# Patient Record
Sex: FEMALE | Race: WHITE | NOT HISPANIC OR LATINO | Employment: OTHER | URBAN - METROPOLITAN AREA
[De-identification: names, ages, dates, MRNs, and addresses within clinical notes are randomized per-mention and may not be internally consistent; named-entity substitution may affect disease eponyms.]

---

## 2017-05-10 ENCOUNTER — OFFICE VISIT (OUTPATIENT)
Dept: CARDIOLOGY | Facility: CLINIC | Age: 82
End: 2017-05-10

## 2017-05-10 VITALS
DIASTOLIC BLOOD PRESSURE: 84 MMHG | SYSTOLIC BLOOD PRESSURE: 112 MMHG | HEIGHT: 56 IN | HEART RATE: 68 BPM | WEIGHT: 165 LBS | BODY MASS INDEX: 37.12 KG/M2

## 2017-05-10 DIAGNOSIS — Z95.2 S/P AVR: ICD-10-CM

## 2017-05-10 DIAGNOSIS — I51.89 DIASTOLIC DYSFUNCTION: Primary | ICD-10-CM

## 2017-05-10 PROCEDURE — 99213 OFFICE O/P EST LOW 20 MIN: CPT | Performed by: INTERNAL MEDICINE

## 2017-05-10 PROCEDURE — 93000 ELECTROCARDIOGRAM COMPLETE: CPT | Performed by: INTERNAL MEDICINE

## 2018-05-25 ENCOUNTER — OFFICE VISIT (OUTPATIENT)
Dept: CARDIOLOGY | Facility: CLINIC | Age: 83
End: 2018-05-25

## 2018-05-25 VITALS
SYSTOLIC BLOOD PRESSURE: 134 MMHG | DIASTOLIC BLOOD PRESSURE: 60 MMHG | BODY MASS INDEX: 35.38 KG/M2 | HEIGHT: 57 IN | WEIGHT: 164 LBS | HEART RATE: 67 BPM

## 2018-05-25 DIAGNOSIS — I51.89 DIASTOLIC DYSFUNCTION: ICD-10-CM

## 2018-05-25 DIAGNOSIS — Z95.2 S/P AVR: ICD-10-CM

## 2018-05-25 DIAGNOSIS — I10 ESSENTIAL HYPERTENSION: ICD-10-CM

## 2018-05-25 DIAGNOSIS — I49.3 PVC (PREMATURE VENTRICULAR CONTRACTION): ICD-10-CM

## 2018-05-25 DIAGNOSIS — I35.0 AORTIC VALVE STENOSIS, ETIOLOGY OF CARDIAC VALVE DISEASE UNSPECIFIED: Primary | ICD-10-CM

## 2018-05-25 DIAGNOSIS — H54.8 LEGALLY BLIND: ICD-10-CM

## 2018-05-25 DIAGNOSIS — I48.0 PAROXYSMAL ATRIAL FIBRILLATION (HCC): ICD-10-CM

## 2018-05-25 PROCEDURE — 99214 OFFICE O/P EST MOD 30 MIN: CPT | Performed by: NURSE PRACTITIONER

## 2018-05-25 PROCEDURE — 93000 ELECTROCARDIOGRAM COMPLETE: CPT | Performed by: NURSE PRACTITIONER

## 2018-05-25 NOTE — PROGRESS NOTES
Date of Office Visit: 2018  Encounter Provider: RIGO Giraldo  Place of Service: [facility] CARDIOLOGY  Patient Name: Helena Carmen  :1931    Chief Complaint   Patient presents with   • Follow-up   :     HPI: Helena Carmen is a 87 y.o. female who presents today for annual cardiac follow up. She is a new patient to me and her previous records have been reviewed. She has a history of hypertension, hyperlipidemia, thyroid disease, type 2 diabetes mellitus, and is legally blind.    She has a history of severe aortic stenosis and nonobstructive coronary artery disease per cardiac catheterization.  In 2013 she underwent 21 mm [place] trifecta bovine aortic valvular placement.  Postoperatively she developed renal insufficiency, atrial fibrillation, and encephalopathy.  Her atrial fibrillation resolved and warfarin was discontinued.  She's had a follow-up Holter monitor which showed no evidence of atrial fibrillation and PVCs.  Her last echocardiogram was completed in 2013 with an EF of 51%, mild to moderate left ventricular hypertrophy, grade 2 diastolic dysfunction, moderate to severe left atrial enlargement, mild to moderate mitral insufficiency, mild tricuspid insufficiency, and aortic tissue valve functioning well without any stenosis or insufficiency.  She is an established patient of Dr. Beth Butcher and was last in the office in May 2017.    She is accompanied today by her daughter.  She does experience dyspnea with walking long distances which has improved.  She denies chest pain, PND, orthopnea, cough, edema, palpitations, dizziness, or syncope.  Since her last office visit she has now been started on insulin and feels that her blood sugars are better controlled.  Her blood pressure and heart rate are both normal.    The following portion of the patient's history were reviewed and updated as appropriate: past medical history, past surgical  history, past social history, past family history, allergies, current medications, and problem list.    Past Medical History:   Diagnosis Date   • Aortic valve stenosis     s/p tissue AVR   • Back pain    • Diastolic dysfunction     Grade 2 per echocardiogram 2013   • Diverticulosis    • Exertional shortness of breath    • Fatigue    • Hiatal hernia    • Hyperlipidemia    • Hypertension    • Hyperthyroidism    • Hypothyroidism    • Left ventricular hypertrophy    • Legally blind    • Macular degeneration    • Mitral regurgitation    • Osteoarthritis of hip    • Paroxysmal atrial fibrillation    • Premature ventricular contractions    • Pulmonary hypertension    • Renal insufficiency syndrome    • Type 2 diabetes mellitus     type 2       Past Surgical History:   Procedure Laterality Date   • AORTIC VALVE REPAIR/REPLACEMENT     • CATARACT EXTRACTION      1970, 1999   • HYSTERECTOMY  2007   • STERNOTOMY         Social History     Social History   • Marital status:      Spouse name: N/A   • Number of children: N/A   • Years of education: N/A     Occupational History   • Not on file.     Social History Main Topics   • Smoking status: Former Smoker   • Smokeless tobacco: Never Used      Comment: caffeine use   • Alcohol use No   • Drug use: Unknown   • Sexual activity: Not on file       Family History   Problem Relation Age of Onset   • Heart disease Mother    • Hypertension Mother    • Stroke Mother    • Diabetes Mother         mellitus   • Other Other         cardiovascular disorder       Review of Systems   Constitution: Positive for malaise/fatigue. Negative for chills, diaphoresis, fever, night sweats, weight gain and weight loss.   HENT: Negative for hearing loss, nosebleeds, sore throat and tinnitus.    Eyes: Positive for vision loss in left eye and vision loss in right eye. Negative for blurred vision, double vision, pain and visual disturbance.   Cardiovascular: Positive for dyspnea on exertion. Negative  for chest pain, claudication, cyanosis, irregular heartbeat, leg swelling, near-syncope, orthopnea, palpitations, paroxysmal nocturnal dyspnea and syncope.   Respiratory: Negative for cough, hemoptysis, shortness of breath, snoring and wheezing.    Endocrine: Positive for cold intolerance and heat intolerance. Negative for polyuria.   Hematologic/Lymphatic: Negative for bleeding problem. Does not bruise/bleed easily.   Skin: Negative for color change, dry skin, flushing and itching.   Musculoskeletal: Positive for joint pain. Negative for falls, joint swelling, muscle cramps, muscle weakness and myalgias.   Gastrointestinal: Negative for abdominal pain, constipation, heartburn, melena, nausea and vomiting.   Genitourinary: Positive for frequency. Negative for dysuria and hematuria.   Neurological: Negative for excessive daytime sleepiness, dizziness, light-headedness, loss of balance, numbness, paresthesias, seizures and vertigo.   Psychiatric/Behavioral: Negative for altered mental status, depression, memory loss and substance abuse. The patient does not have insomnia and is not nervous/anxious.    Allergic/Immunologic: Negative for environmental allergies.       Allergies   Allergen Reactions   • Erythromycin    • Keflex  [Cephalexin]    • Penicillins    • Sulfa Antibiotics          Current Outpatient Prescriptions:   •  amLODIPine (NORVASC) 10 MG tablet, Take 1 tablet by mouth daily., Disp: , Rfl:   •  aspirin 81 MG tablet, Take by mouth daily., Disp: , Rfl:   •  esomeprazole (NEXIUM) 40 MG capsule, Take 1 capsule by mouth daily., Disp: , Rfl:   •  fenofibrate (TRICOR) 145 MG tablet, Take 1 tablet by mouth daily., Disp: , Rfl:   •  Ferrous Sulfate (IRON) 325 (65 FE) MG tablet, Take 1 tablet by mouth 2 (two) times a day with meals., Disp: , Rfl:   •  gabapentin (NEURONTIN) 600 MG tablet, Take 1 tablet by mouth 2 (two) times a day., Disp: , Rfl:   •  glyBURIDE-metFORMIN (GLUCOVANCE) 5-500 MG per tablet, Take 2  "tablets by mouth 2 (two) times a day., Disp: , Rfl:   •  isosorbide mononitrate (IMDUR) 60 MG 24 hr tablet, Take 2 tablets by mouth daily., Disp: , Rfl:   •  levothyroxine (SYNTHROID) 25 MCG tablet, Take 1 tablet by mouth daily., Disp: , Rfl:   •  linagliptin (TRADJENTA) 5 MG tablet tablet, Take 5 mg by mouth daily., Disp: , Rfl:   •  loratadine (CLARITIN) 10 MG tablet, Take 1 tablet by mouth daily., Disp: , Rfl:   •  LORazepam (ATIVAN) 0.5 MG tablet, Take 1 tablet by mouth as needed., Disp: , Rfl:   •  losartan (COZAAR) 100 MG tablet, Take 1 tablet by mouth daily., Disp: , Rfl:   •  metoclopramide (REGLAN) 10 MG tablet, Take 1 tablet by mouth daily., Disp: , Rfl:   •  metoprolol tartrate (LOPRESSOR) 50 MG tablet, Take 1 tablet by mouth every 12 (twelve) hours., Disp: , Rfl:   •  montelukast (SINGULAIR) 10 MG tablet, Take 1 tablet by mouth daily., Disp: , Rfl:   •  Multiple Vitamin tablet, Take 1 tablet by mouth daily., Disp: , Rfl:   •  Sennosides (SENOKOT PO), Take by mouth., Disp: , Rfl:   •  silodosin (RAPAFLO) 8 MG capsule, Take 4 mg by mouth daily with breakfast., Disp: , Rfl:   •  simvastatin (ZOCOR) 40 MG tablet, Take 20 mg by mouth every night., Disp: , Rfl:   •  spironolactone (ALDACTONE) 25 MG tablet, Take 0.5 tablets by mouth daily., Disp: , Rfl:   •  Vitamin E 400 UNITS tablet, Take 1 tablet by mouth daily., Disp: , Rfl:       Objective:     Vitals:    05/25/18 1440   BP: 134/60   Pulse: 67   Weight: 74.4 kg (164 lb)   Height: 144.8 cm (57\")     Body mass index is 35.49 kg/m².    PHYSICAL EXAM:    Vitals Reviewed.   General Appearance: No acute distress, well developed and well nourished.   Eyes: Conjunctiva and lids: No erythema, swelling, or discharge. Sclera non-icteric.  Legally blind, wears glasses.  HENT: Atraumatic, normocephalic. External eyes, ears, and nose normal. No hearing loss noted. Mucous membranes normal. Lips not cyanotic. Neck supple with no tenderness.  Respiratory: No signs of " respiratory distress. Respiration rhythm and depth normal.   Clear to auscultation. No rales, crackles, rhonchi, or wheezing auscultated.   Cardiovascular:  Jugular Venous Pressure: Normal  Heart Rate and Rhythm: Normal, Heart Sounds: Normal S1 and S2. No S3 or S4 noted.  Murmurs: No murmurs noted. No rubs, thrills, or gallops.   Arterial Pulses: Carotid pulses normal. No carotid bruit noted. Posterior tibialis and dorsalis pedis pulses normal.   Lower Extremities: No edema noted.  Gastrointestinal:  Abdomen soft, non-distended, non-tender. Normal bowel sounds. No hepatomegaly.   Musculoskeletal: Normal movement of extremities  Skin and Nails: General appearance normal. No pallor, cyanosis, diaphoresis. Skin temperature normal. No clubbing of fingernails.   Psychiatric: Patient alert and oriented to person, place, and time. Speech and behavior appropriate. Normal mood and affect.       ECG 12 Lead  Date/Time: 5/25/2018 2:37 PM  Performed by: MARY REDMOND  Authorized by: MARY REDMOND   Comparison: compared with previous ECG from 5/10/2017  Similar to previous ECG  Rhythm: sinus rhythm  Ectopy: frequent PVCs  Rate: normal  BPM: 67  Conduction: conduction normal  ST Segments: ST segments normal  T Waves: T waves normal  QRS axis: normal  Clinical impression: abnormal ECG              Assessment:       Diagnosis Plan   1. Aortic valve stenosis, etiology of cardiac valve disease unspecified     2. S/P AVR     3. PVC (premature ventricular contraction)     4. Diastolic dysfunction     5. Essential hypertension     6. Legally blind     7. Paroxysmal atrial fibrillation            Plan:       1.  Severe Aortic Stenosis: Status post aortic valve tissue replacement in June 2013.  Doing well and no heart murmur appreciated.  2.  PVCs: Noted on EKG today and patient is asymptomatic.  She has a history of PVCs and ventricular bigeminy.  3.  Diastolic Dysfunction: Grade 2 per 2-D echocardiogram in 2013.  Appears well  compensated.  4.  Hypertension: Blood pressure well controlled today on current medication regimen.  I will request her last BMP/CMP from PCP office.  5.  Legally blind: Unchanged and due to macular degeneration.  6.  Postoperative Atrial Fibrillation: No reoccurrence, remains on beta blocker therapy, off warfarin.  7.  I've recommended follow-up with Dr. Beth Butcher in one year, unless otherwise needed sooner.    Addendum 5/31/2018: I reviewed blood work completed on 4/2/18 which included a CMP which was normal except for elevated creatinine of 1.44 and blood glucose 241.  Potassium normal at 4.4.  Hemoglobin A1c elevated at 8.5%.  CBC showed a normal hemoglobin and hematocrit.  Total cholesterol 164, triglycerides 353, HDL 35, and LDL 58.  TSH normal at 1.54.    As always, it has been a pleasure to participate in your patient's care.      Sincerely,         RIGO Pedro

## 2018-05-31 PROBLEM — E78.1 HYPERTRIGLYCERIDEMIA: Status: ACTIVE | Noted: 2018-05-31

## 2019-03-20 ENCOUNTER — OFFICE VISIT (OUTPATIENT)
Dept: GASTROENTEROLOGY | Facility: CLINIC | Age: 84
End: 2019-03-20

## 2019-03-20 VITALS
BODY MASS INDEX: 35.6 KG/M2 | WEIGHT: 165 LBS | DIASTOLIC BLOOD PRESSURE: 84 MMHG | SYSTOLIC BLOOD PRESSURE: 156 MMHG | HEIGHT: 57 IN | TEMPERATURE: 98.4 F

## 2019-03-20 DIAGNOSIS — R10.84 GENERALIZED ABDOMINAL PAIN: Primary | ICD-10-CM

## 2019-03-20 DIAGNOSIS — R19.7 DIARRHEA, UNSPECIFIED TYPE: ICD-10-CM

## 2019-03-20 PROCEDURE — 99203 OFFICE O/P NEW LOW 30 MIN: CPT | Performed by: INTERNAL MEDICINE

## 2019-03-20 RX ORDER — PANTOPRAZOLE SODIUM 40 MG/1
40 TABLET, DELAYED RELEASE ORAL DAILY
COMMUNITY
End: 2022-03-09 | Stop reason: HOSPADM

## 2019-03-20 RX ORDER — ROSUVASTATIN CALCIUM 20 MG/1
20 TABLET, COATED ORAL DAILY
COMMUNITY
End: 2022-03-09 | Stop reason: HOSPADM

## 2019-03-20 RX ORDER — INSULIN GLARGINE 100 [IU]/ML
12 INJECTION, SOLUTION SUBCUTANEOUS DAILY
COMMUNITY
End: 2022-03-21 | Stop reason: ALTCHOICE

## 2019-03-20 NOTE — PROGRESS NOTES
Chief Complaint   Patient presents with   • Hemorrhoids   • change in bowel habits   • Diarrhea       History of Present Illness: 88-year-old female c/o anal irritation that she thinks is from hemorrhoids. She has had explosive BM's. She doesn't feel like she ever completely empties out. She has on and off diarrhea. NO constipation. 2-3 BM/day average.  NO rectal bleedng or melena. She has on and off lower abdominal pain. She doesn't know what makes the abdominal pain worse or better. No nausea or vomiting. No fevers, chills. Weight stable. Last colonoscopy in 2013 or 2014. Weight stable. Nonsmoker. No ETOH.   Past Medical History:   Diagnosis Date   • Aortic valve stenosis     s/p tissue AVR   • Back pain    • Diastolic dysfunction     Grade 2 per echocardiogram 2013   • Diverticulosis    • Exertional shortness of breath    • Fatigue    • Hiatal hernia    • Hyperlipidemia    • Hypertension    • Hyperthyroidism    • Hypertriglyceridemia 5/31/2018   • Hypothyroidism    • Left ventricular hypertrophy    • Legally blind    • Macular degeneration    • Mitral regurgitation    • Osteoarthritis of hip    • Paroxysmal atrial fibrillation (CMS/HCC)    • Premature ventricular contractions    • Pulmonary hypertension (CMS/HCC)    • Renal insufficiency syndrome    • Type 2 diabetes mellitus (CMS/HCC)     type 2       Past Surgical History:   Procedure Laterality Date   • AORTIC VALVE REPAIR/REPLACEMENT     • CATARACT EXTRACTION      1970, 1999   • ENDOSCOPY  08/15/2014    no gross lesions in stomach/duodenum, erythrematous mucosa in stomach   • HYSTERECTOMY  2007   • STERNOTOMY           Current Outpatient Medications:   •  amLODIPine (NORVASC) 10 MG tablet, Take 1 tablet by mouth daily., Disp: , Rfl:   •  aspirin 81 MG tablet, Take by mouth daily., Disp: , Rfl:   •  fenofibrate (TRICOR) 145 MG tablet, Take 1 tablet by mouth daily., Disp: , Rfl:   •  Ferrous Sulfate (IRON) 325 (65 FE) MG tablet, Take 1 tablet by mouth 2 (two)  times a day with meals., Disp: , Rfl:   •  gabapentin (NEURONTIN) 600 MG tablet, Take 1 tablet by mouth 2 (two) times a day., Disp: , Rfl:   •  glyBURIDE-metFORMIN (GLUCOVANCE) 5-500 MG per tablet, Take 2 tablets by mouth 2 (two) times a day., Disp: , Rfl:   •  insulin glargine (LANTUS) 100 UNIT/ML injection, Inject 12 Units under the skin into the appropriate area as directed Daily., Disp: , Rfl:   •  isosorbide mononitrate (IMDUR) 60 MG 24 hr tablet, Take 2 tablets by mouth daily., Disp: , Rfl:   •  levothyroxine (SYNTHROID) 25 MCG tablet, Take 1 tablet by mouth daily., Disp: , Rfl:   •  linagliptin (TRADJENTA) 5 MG tablet tablet, Take 5 mg by mouth daily., Disp: , Rfl:   •  loratadine (CLARITIN) 10 MG tablet, Take 1 tablet by mouth daily., Disp: , Rfl:   •  LORazepam (ATIVAN) 0.5 MG tablet, Take 1 tablet by mouth as needed., Disp: , Rfl:   •  losartan (COZAAR) 100 MG tablet, Take 1 tablet by mouth daily., Disp: , Rfl:   •  metoclopramide (REGLAN) 10 MG tablet, Take 1 tablet by mouth daily., Disp: , Rfl:   •  metoprolol tartrate (LOPRESSOR) 50 MG tablet, Take 1 tablet by mouth every 12 (twelve) hours., Disp: , Rfl:   •  montelukast (SINGULAIR) 10 MG tablet, Take 1 tablet by mouth daily., Disp: , Rfl:   •  Multiple Vitamin tablet, Take 1 tablet by mouth daily., Disp: , Rfl:   •  pantoprazole (PROTONIX) 40 MG EC tablet, Take 40 mg by mouth Daily., Disp: , Rfl:   •  rosuvastatin (CRESTOR) 20 MG tablet, Take 20 mg by mouth Daily., Disp: , Rfl:   •  Sennosides (SENOKOT PO), Take by mouth., Disp: , Rfl:   •  silodosin (RAPAFLO) 8 MG capsule, Take 4 mg by mouth daily with breakfast., Disp: , Rfl:   •  spironolactone (ALDACTONE) 25 MG tablet, Take 0.5 tablets by mouth daily., Disp: , Rfl:   •  Vitamin E 400 UNITS tablet, Take 1 tablet by mouth daily., Disp: , Rfl:     Allergies   Allergen Reactions   • Erythromycin    • Keflex  [Cephalexin]    • Penicillins    • Sulfa Antibiotics        Family History   Problem Relation  Age of Onset   • Heart disease Mother    • Hypertension Mother    • Stroke Mother    • Diabetes Mother         mellitus   • Other Other         cardiovascular disorder       Social History     Socioeconomic History   • Marital status:      Spouse name: Not on file   • Number of children: Not on file   • Years of education: Not on file   • Highest education level: Not on file   Tobacco Use   • Smoking status: Former Smoker   • Smokeless tobacco: Never Used   • Tobacco comment: caffeine use   Substance and Sexual Activity   • Alcohol use: No       Review of Systems   All other systems reviewed and are negative.      Vitals:    03/20/19 1352   BP: 156/84   Temp: 98.4 °F (36.9 °C)       Physical Exam   Constitutional: She is oriented to person, place, and time. She appears well-developed and well-nourished. No distress.   HENT:   Head: Normocephalic and atraumatic. Hair is normal.   Right Ear: Hearing, tympanic membrane, external ear and ear canal normal. No drainage. No decreased hearing is noted.   Left Ear: Hearing, tympanic membrane, external ear and ear canal normal. No decreased hearing is noted.   Nose: No nasal deformity.   Mouth/Throat: Oropharynx is clear and moist.   Eyes: Conjunctivae, EOM and lids are normal. Pupils are equal, round, and reactive to light. Right eye exhibits no discharge. Left eye exhibits no discharge.   Neck: Normal range of motion. Neck supple. No JVD present. No tracheal deviation present. No thyromegaly present.   Cardiovascular: Normal rate, regular rhythm, normal heart sounds, intact distal pulses and normal pulses. Exam reveals no gallop and no friction rub.   No murmur heard.  Pulmonary/Chest: Effort normal and breath sounds normal. No respiratory distress. She has no wheezes. She has no rales. She exhibits no tenderness.   Abdominal: Soft. Bowel sounds are normal. She exhibits no distension and no mass. There is no tenderness. There is no rebound and no guarding. No hernia.    Genitourinary: Rectal exam shows guaiac negative stool.   Genitourinary Comments: External hemorrhoids.    Musculoskeletal: Normal range of motion. She exhibits no edema, tenderness or deformity.   Lymphadenopathy:     She has no cervical adenopathy.   Neurological: She is alert and oriented to person, place, and time. She has normal reflexes. She displays normal reflexes. No cranial nerve deficit. She exhibits normal muscle tone. Coordination normal.   Skin: Skin is warm and dry. No rash noted. She is not diaphoretic. No erythema.   Psychiatric: She has a normal mood and affect. Her behavior is normal. Judgment and thought content normal.   Vitals reviewed.      Helena was seen today for hemorrhoids, change in bowel habits and diarrhea.    Diagnoses and all orders for this visit:    Generalized abdominal pain  -     CT Abdomen Pelvis With Contrast; Future    Diarrhea, unspecified type  -     CT Abdomen Pelvis With Contrast; Future      Assessment:  1. H/o colon polyps  2) Abdominal discomfort.   3) Off and on Diarrhea.     Recommendations:  1. Get labs done recently by Dr. Hickman  2) Take a fiber supplement daily  3) CT abd/pelvis   4) f/u 6 weeks.     Return in about 6 weeks (around 5/1/2019).    Freddy Martel MD  3/20/2019

## 2019-03-21 ENCOUNTER — TELEPHONE (OUTPATIENT)
Dept: GASTROENTEROLOGY | Facility: CLINIC | Age: 84
End: 2019-03-21

## 2019-03-21 DIAGNOSIS — R10.84 GENERALIZED ABDOMINAL PAIN: Primary | ICD-10-CM

## 2019-03-21 DIAGNOSIS — E35 DISORDERS OF ENDOCRINE GLANDS IN DISEASES CLASSIFIED ELSEWHERE: ICD-10-CM

## 2019-03-21 NOTE — TELEPHONE ENCOUNTER
Please tell her that the labs done by Dr. Hickman do not include labs that I would like to see.  Please have her come by the office to have the following lab work drawn:  -CBC, comprehensive metabolic panel, TSH, celiac sprue antibody panel. Zuleima kjdoug

## 2019-03-22 NOTE — TELEPHONE ENCOUNTER
Call to pt.  Advise per Dr Martel that labs done by Dr Hickman do not include labs that he would like to see.  Come to office for additional labs.    Check with [place] - may come today as walk in between 1-2pm.    Order placed for cbc, cmp, tsh, celiac sprue antibody panel - message to DR Martel.

## 2019-03-23 LAB
ALBUMIN SERPL-MCNC: 4.4 G/DL (ref 3.5–5.2)
ALBUMIN/GLOB SERPL: 1.8 G/DL
ALP SERPL-CCNC: 24 U/L (ref 39–117)
ALT SERPL-CCNC: 13 U/L (ref 1–33)
AST SERPL-CCNC: 18 U/L (ref 1–32)
BILIRUB SERPL-MCNC: 0.3 MG/DL (ref 0.2–1.2)
BUN SERPL-MCNC: 24 MG/DL (ref 8–23)
BUN/CREAT SERPL: 20 (ref 7–25)
CALCIUM SERPL-MCNC: 9.6 MG/DL (ref 8.6–10.5)
CHLORIDE SERPL-SCNC: 101 MMOL/L (ref 98–107)
CO2 SERPL-SCNC: 20.2 MMOL/L (ref 22–29)
CREAT SERPL-MCNC: 1.2 MG/DL (ref 0.57–1)
DIFFERENTIAL COMMENT: NORMAL
ERYTHROCYTE [DISTWIDTH] IN BLOOD BY AUTOMATED COUNT: 14.2 % (ref 12.3–15.4)
GLOBULIN SER CALC-MCNC: 2.4 GM/DL
GLUCOSE SERPL-MCNC: 228 MG/DL (ref 65–99)
HCT VFR BLD AUTO: 37.7 % (ref 34–46.6)
HGB BLD-MCNC: 11.8 G/DL (ref 12–15.9)
MCH RBC QN AUTO: 29.1 PG (ref 26.6–33)
MCHC RBC AUTO-ENTMCNC: 31.3 G/DL (ref 31.5–35.7)
MCV RBC AUTO: 93.1 FL (ref 79–97)
PLATELET # BLD AUTO: 52 10*3/MM3 (ref 140–450)
PLATELET BLD QL SMEAR: NORMAL
POTASSIUM SERPL-SCNC: 4.5 MMOL/L (ref 3.5–5.2)
PROT SERPL-MCNC: 6.8 G/DL (ref 6–8.5)
RBC # BLD AUTO: 4.05 10*6/MM3 (ref 3.77–5.28)
RBC MORPH BLD: NORMAL
SODIUM SERPL-SCNC: 136 MMOL/L (ref 136–145)
TSH SERPL DL<=0.005 MIU/L-ACNC: 2.32 MIU/ML (ref 0.27–4.2)
WBC # BLD AUTO: 4.84 10*3/MM3 (ref 3.4–10.8)

## 2019-03-25 LAB
ENDOMYSIUM IGA SER QL: NEGATIVE
GLIADIN PEPTIDE IGA SER-ACNC: 3 UNITS (ref 0–19)
GLIADIN PEPTIDE IGG SER-ACNC: 2 UNITS (ref 0–19)
IGA SERPL-MCNC: 121 MG/DL (ref 64–422)
TTG IGA SER-ACNC: <2 U/ML (ref 0–3)
TTG IGG SER-ACNC: <2 U/ML (ref 0–5)

## 2019-03-27 ENCOUNTER — TELEPHONE (OUTPATIENT)
Dept: GASTROENTEROLOGY | Facility: CLINIC | Age: 84
End: 2019-03-27

## 2019-03-27 NOTE — TELEPHONE ENCOUNTER
----- Message from Freddy Martel MD sent at 3/25/2019  3:03 PM EDT -----  Tell her that I do not have any lab work to compare with the lab work that we avani on her recently.  Her celiac sprue antibody panel came back normal which is good.  Her CBC shows a normal white count of 4.8.  Her hemoglobin is slightly low at 11.8.  Her platelet count is low at 52,000.  Again I do not have anything to compare her platelet count 2 from past labs.  Her blood glucose was 228 but she knows that she has diabetes mellitus.  Her serum creatinine is 1.2 which is mildly elevated.  Her liver function tests look normal which is good.  Her thyroid-stimulating hormone is normal.  We will see what the CAT scan of the abdomen and pelvis shows?  Please fax a copy of these labs to her PCP, Dr. Mariah Hickman.. Thx. kjh

## 2019-03-27 NOTE — TELEPHONE ENCOUNTER
Call to pt.  Advise per Dr Martel do not have any lab work to compare recent lab work to.  Celiac panel came back normal, which is good.  CBC shows a normal white count of 4.8.  Hgb is slightly low at 11.8.  Platelet count is low at 52,000.  Again - do not have anything to compare this to from past labs.  Blood glucose was 228, but know that have DM.  Cr is 1.2, which is mildly elevated.  LFTs look normal, which is good.  TSH is normal.  Will see what the CT shows.  Verb understanding.    Labs faxed via epic to Dr Hickman.

## 2019-03-29 ENCOUNTER — TELEPHONE (OUTPATIENT)
Dept: GASTROENTEROLOGY | Facility: CLINIC | Age: 84
End: 2019-03-29

## 2019-03-29 DIAGNOSIS — R10.84 GENERALIZED ABDOMINAL PAIN: Primary | ICD-10-CM

## 2019-04-07 NOTE — TELEPHONE ENCOUNTER
Please reorder her CT abd/pelvis to be without IV contrast (but with oral contrast) to evaluate her abdominal pain. Let the patient and her daughter know that we have reordered the CT abd/pelvis to be done without IV contrast (as recommended by her Nephrologist, Dr. Wick) but with oral contrast (which shouldn't hurt her or her kidneys but will give us more information). thx.kjh

## 2019-04-08 NOTE — TELEPHONE ENCOUNTER
Pt's daughter called and advised per Dr Martel and advised per Dr Martel that he has changed the order to ct abd/pelvis to without IV contrast but with oral contrast.  This should be safe for her kidneys but will give him more info.  Pt' s daughter verb understanding.

## 2019-04-08 NOTE — TELEPHONE ENCOUNTER
VM to daughter, Margaret (see hipaa) with request to contact office.    CT order placed per DR Martel instructions.  Call to [facility] and spoke with Sima.  Advise of order change.  She will correct in system.

## 2019-04-15 ENCOUNTER — APPOINTMENT (OUTPATIENT)
Dept: CT IMAGING | Facility: HOSPITAL | Age: 84
End: 2019-04-15

## 2019-04-15 ENCOUNTER — HOSPITAL ENCOUNTER (OUTPATIENT)
Dept: CT IMAGING | Facility: HOSPITAL | Age: 84
Discharge: HOME | End: 2019-04-15
Admitting: INTERNAL MEDICINE

## 2019-04-15 DIAGNOSIS — R10.84 GENERALIZED ABDOMINAL PAIN: ICD-10-CM

## 2019-04-15 PROCEDURE — 74176 CT ABD & PELVIS W/O CONTRAST: CPT

## 2019-04-19 ENCOUNTER — TELEPHONE (OUTPATIENT)
Dept: GASTROENTEROLOGY | Facility: CLINIC | Age: 84
End: 2019-04-19

## 2019-04-19 NOTE — TELEPHONE ENCOUNTER
Pt's daughter Radha called and results reviewed with her.She verb understanding .      Copy of ct scan report and Dr Martel's note faxed to pt's pcp thru [facility].

## 2019-04-19 NOTE — TELEPHONE ENCOUNTER
----- Message from Freddy Martel MD sent at 4/18/2019  4:27 PM EDT -----  I called the patient and discussed the results of this CAT scan the abdomen and pelvis with her.  She has never had jaundice or hepatitis and has never had any chronic liver abnormalities.  I told her that we should probably do an EGD to biopsy her thickened gastric folds.  The patient is really not interested in having that done.  I told her to think about it and to discuss with her daughter.  She is going to follow-up with 1 of the nurse practitioners in about 3 weeks and can discuss this then.  Please send a copy of this report to her PCP.. Thx. kjdoug

## 2019-04-26 ENCOUNTER — OFFICE VISIT (OUTPATIENT)
Dept: OBSTETRICS AND GYNECOLOGY | Facility: CLINIC | Age: 84
End: 2019-04-26

## 2019-04-26 ENCOUNTER — APPOINTMENT (OUTPATIENT)
Dept: WOMENS IMAGING | Facility: HOSPITAL | Age: 84
End: 2019-04-26

## 2019-04-26 ENCOUNTER — PROCEDURE VISIT (OUTPATIENT)
Dept: OBSTETRICS AND GYNECOLOGY | Facility: CLINIC | Age: 84
End: 2019-04-26

## 2019-04-26 VITALS
SYSTOLIC BLOOD PRESSURE: 159 MMHG | BODY MASS INDEX: 35.06 KG/M2 | HEART RATE: 64 BPM | WEIGHT: 162 LBS | DIASTOLIC BLOOD PRESSURE: 76 MMHG

## 2019-04-26 DIAGNOSIS — Z01.419 ENCOUNTER FOR GYNECOLOGICAL EXAMINATION: Primary | ICD-10-CM

## 2019-04-26 DIAGNOSIS — Z12.31 VISIT FOR SCREENING MAMMOGRAM: Primary | ICD-10-CM

## 2019-04-26 PROCEDURE — G0101 CA SCREEN;PELVIC/BREAST EXAM: HCPCS | Performed by: OBSTETRICS & GYNECOLOGY

## 2019-04-26 PROCEDURE — 77067 SCR MAMMO BI INCL CAD: CPT | Performed by: OBSTETRICS & GYNECOLOGY

## 2019-04-26 PROCEDURE — 77067 SCR MAMMO BI INCL CAD: CPT | Performed by: RADIOLOGY

## 2019-04-26 RX ORDER — ASPIRIN 81 MG/1
TABLET ORAL
COMMUNITY
Start: 2019-03-21 | End: 2019-05-21 | Stop reason: SDUPTHER

## 2019-04-26 NOTE — PROGRESS NOTES
Subjective   Helena Carmen is a 88 y.o. female here for annual exam.   Mammo- 2019      History of Present Illness   Patient is an 88-year-old female that presents for annual gynecological exam.  She is a former patient of Dr. Shannon.  Patient's daughter states that she has a past history of hysterectomy for precancer of the uterus.  Denies any recent vaginal bleeding.  Patient had her screening mammogram today and her daughter states that her last bone density scan was several years ago and they were told it was normal.    The following portions of the patient's history were reviewed and updated as appropriate: allergies, current medications, past family history, past medical history, past social history, past surgical history and problem list.    Review of Systems   Genitourinary: Negative for vaginal bleeding.   All other systems reviewed and are negative.      Objective   Physical Exam  Physical Exam   Constitutional: She appears well-developed and well-nourished.   Cardiovascular: Normal rate and regular rhythm.    Pulmonary/Chest: Effort normal and breath sounds normal. Right breast exhibits no inverted nipple, no mass, no nipple discharge, no skin change and no tenderness. Left breast exhibits no inverted nipple, no mass, no nipple discharge, no skin change and no tenderness.   Abdominal: Soft. She exhibits no distension. There is no tenderness.   Genitourinary: Vagina normal. There is no rash, tenderness, lesion or injury on the right labia. There is no rash, tenderness, lesion or injury on the left labia. Right adnexum displays no mass, no tenderness and no fullness. Left adnexum displays no mass, no tenderness and no fullness.   Genitourinary Comments: Uterus and cervix absent   Neurological: She is alert.   Psychiatric: She has a normal mood and affect.   Vitals reviewed.      Assessment/Plan   Helena was seen today for gynecologic exam.    Diagnoses and all orders for this visit:    Encounter for  gynecological examination    Patient's previous GYN records and DEXA scan have been requested.  Patient was counseled that she may follow-up on an as-needed basis and to follow-up if she ever experiences any vaginal bleeding.  She may continue with her screening mammograms and bone density scans through our office.

## 2019-05-06 ENCOUNTER — OFFICE VISIT (OUTPATIENT)
Dept: GASTROENTEROLOGY | Facility: CLINIC | Age: 84
End: 2019-05-06

## 2019-05-06 ENCOUNTER — TELEPHONE (OUTPATIENT)
Dept: GASTROENTEROLOGY | Facility: CLINIC | Age: 84
End: 2019-05-06

## 2019-05-06 VITALS
TEMPERATURE: 98.5 F | BODY MASS INDEX: 35.6 KG/M2 | SYSTOLIC BLOOD PRESSURE: 130 MMHG | DIASTOLIC BLOOD PRESSURE: 76 MMHG | HEIGHT: 57 IN | WEIGHT: 165 LBS

## 2019-05-06 DIAGNOSIS — R93.5 ABNORMAL CT OF THE ABDOMEN: ICD-10-CM

## 2019-05-06 DIAGNOSIS — R10.30 LOWER ABDOMINAL PAIN: Primary | ICD-10-CM

## 2019-05-06 DIAGNOSIS — R19.7 DIARRHEA, UNSPECIFIED TYPE: ICD-10-CM

## 2019-05-06 DIAGNOSIS — K21.9 GASTROESOPHAGEAL REFLUX DISEASE, ESOPHAGITIS PRESENCE NOT SPECIFIED: ICD-10-CM

## 2019-05-06 PROCEDURE — 99214 OFFICE O/P EST MOD 30 MIN: CPT | Performed by: NURSE PRACTITIONER

## 2019-05-06 RX ORDER — METOCLOPRAMIDE 10 MG/1
10 TABLET ORAL
Qty: 270 TABLET | Refills: 3 | Status: SHIPPED | OUTPATIENT
Start: 2019-05-06 | End: 2022-03-09 | Stop reason: HOSPADM

## 2019-05-06 NOTE — TELEPHONE ENCOUNTER
----- Message from RIGO Monterroso sent at 5/6/2019  1:39 PM EDT -----  Please fax a letter to Attn: Raeann at [facility] explaining to them it is ok to dispense the reglan. Patient has been on it for over 14 years without any unwanted side effects. I have discussed the possible side effects including tardive dyskinesia and the patient wants to continue the reglan at this time for her gastroparesis. Thanks.   Fax # [phone]

## 2019-05-06 NOTE — TELEPHONE ENCOUNTER
Called [facility] and spoke with pharmacist Raeann .  Laureen Horner's note and she advised we can fax the note to her at [phone].  Note faxed to this number.  Awaiting confirmation.

## 2019-05-06 NOTE — PROGRESS NOTES
Chief Complaint   Patient presents with   • Follow-up       Helena Carmen is a  88 y.o. female here for a follow up visit for abd pain and diarrhea.     HPI  87 yo f presents today accompanied by her daughter for follow up visit for abd pain, GERD and diarrhea. She is a patient of Dr. Martel. She had CT scan abd/pelvis done 4/15/19 that showed:    IMPRESSION:  1. There is no acute abnormality within the abdomen and pelvis.  2. Liver morphology concerning for chronic liver disease.  3. Extensive sigmoid diverticulosis. No CT evidence of diverticulitis.  4. Diffuse gastric fold thickening likely related to underdistention,  however, gastritis is a possibility.    She does have hx GERD but admits she does well with Protonix 40 mg daily. She also has hx gastroparesis and has been doing well with Reglan for the past 14 years. She denies any signs of tardive dyskinesia or any other unwanted side effects. She continues to have issues with chronic diarrhea and abd cramping. She is taking Fiber Con daily and thinks its helping somewhat. She denies any dysphagia, reflux, N&V, constipation, rectal bleeding or melena. She admits her appetite is good and her weight is stable. She does ambulate with a cane.     Past Medical History:   Diagnosis Date   • Aortic valve stenosis     s/p tissue AVR   • Back pain    • Diastolic dysfunction     Grade 2 per echocardiogram 2013   • Diverticulosis    • Exertional shortness of breath    • Fatigue    • Hiatal hernia    • Hyperlipidemia    • Hypertension    • Hyperthyroidism    • Hypertriglyceridemia 5/31/2018   • Hypothyroidism    • Left ventricular hypertrophy    • Legally blind    • Macular degeneration    • Mitral regurgitation    • Osteoarthritis of hip    • Paroxysmal atrial fibrillation (CMS/HCC)    • Premature ventricular contractions    • Pulmonary hypertension (CMS/HCC)    • Renal insufficiency syndrome    • Type 2 diabetes mellitus (CMS/HCC)     type 2       Past Surgical History:    Procedure Laterality Date   • AORTIC VALVE REPAIR/REPLACEMENT     • CATARACT EXTRACTION      1970, 1999   • ENDOSCOPY  08/15/2014    no gross lesions in stomach/duodenum, erythrematous mucosa in stomach   • HYSTERECTOMY  2007   • STERNOTOMY         Scheduled Meds:    Continuous Infusions:  No current facility-administered medications for this visit.     PRN Meds:.    Allergies   Allergen Reactions   • Erythromycin Unknown (See Comments)     Pt states she does not remember but it was many years ago   • Keflex [Cephalexin] Unknown (See Comments)     Pt states she does not remember reaction but it was many years ago    • Penicillins Rash   • Sulfa Antibiotics Itching and Rash       Social History     Socioeconomic History   • Marital status:      Spouse name: Not on file   • Number of children: Not on file   • Years of education: Not on file   • Highest education level: Not on file   Tobacco Use   • Smoking status: Former Smoker   • Smokeless tobacco: Never Used   • Tobacco comment: caffeine use   Substance and Sexual Activity   • Alcohol use: No   • Drug use: No       Family History   Problem Relation Age of Onset   • Heart disease Mother    • Hypertension Mother    • Stroke Mother    • Diabetes Mother         mellitus   • Other Other         cardiovascular disorder       Review of Systems   Constitutional: Negative for appetite change, chills, diaphoresis, fatigue, fever and unexpected weight change.   HENT: Negative for nosebleeds, postnasal drip, sore throat, trouble swallowing and voice change.    Respiratory: Negative for cough, choking, chest tightness, shortness of breath and wheezing.    Cardiovascular: Negative for chest pain.   Gastrointestinal: Positive for abdominal distention. Negative for abdominal pain, anal bleeding, blood in stool, constipation, diarrhea, nausea, rectal pain and vomiting.   Endocrine: Negative for polydipsia, polyphagia and polyuria.   Musculoskeletal: Negative for gait  problem.   Skin: Negative for rash and wound.   Allergic/Immunologic: Negative for food allergies.   Neurological: Negative for dizziness, speech difficulty and light-headedness.   Psychiatric/Behavioral: Negative for confusion, self-injury, sleep disturbance and suicidal ideas.       Vitals:    05/06/19 1250   BP: 130/76   Temp: 98.5 °F (36.9 °C)       Physical Exam   Constitutional: She is oriented to person, place, and time. She appears well-developed and well-nourished. She does not appear ill. No distress.   HENT:   Head: Normocephalic.   Eyes: Pupils are equal, round, and reactive to light.   Cardiovascular: Normal rate, regular rhythm and normal heart sounds.   Pulmonary/Chest: Effort normal and breath sounds normal.   Abdominal: Soft. Bowel sounds are normal. She exhibits no distension and no mass. There is no hepatosplenomegaly. There is no tenderness. There is no rebound and no guarding. No hernia.   Musculoskeletal: Normal range of motion.   Neurological: She is alert and oriented to person, place, and time.   Skin: Skin is warm and dry.   Psychiatric: She has a normal mood and affect. Her speech is normal and behavior is normal. Judgment normal.       No images are attached to the encounter.    Assessment & Plan    1. Lower abdominal pain    2. Diarrhea, unspecified type    3. Gastroesophageal reflux disease, esophagitis presence not specified    4. Abnormal CT of the abdomen    I reviewed the CT scan results with her today. Diarrhea is better with the fiber. Will increase the fiber to BID and see if that continues to improve her symptoms. GERD is well controlled at this time and patient does not want to pursue EGD at this time given the abnormal findings of possible gastritis on the CT scan. Call office in 1-2 weeks with update. Follow up with me or Dr. Martel in 2-3 months.

## 2019-05-21 ENCOUNTER — OFFICE VISIT (OUTPATIENT)
Dept: CARDIOLOGY | Facility: CLINIC | Age: 84
End: 2019-05-21

## 2019-05-21 VITALS
SYSTOLIC BLOOD PRESSURE: 138 MMHG | DIASTOLIC BLOOD PRESSURE: 62 MMHG | WEIGHT: 170 LBS | HEART RATE: 66 BPM | HEIGHT: 57 IN | BODY MASS INDEX: 36.68 KG/M2

## 2019-05-21 DIAGNOSIS — I10 ESSENTIAL HYPERTENSION: ICD-10-CM

## 2019-05-21 DIAGNOSIS — Z95.2 S/P AVR: Primary | ICD-10-CM

## 2019-05-21 DIAGNOSIS — I51.89 DIASTOLIC DYSFUNCTION: ICD-10-CM

## 2019-05-21 PROCEDURE — 93000 ELECTROCARDIOGRAM COMPLETE: CPT | Performed by: INTERNAL MEDICINE

## 2019-05-21 PROCEDURE — 99214 OFFICE O/P EST MOD 30 MIN: CPT | Performed by: INTERNAL MEDICINE

## 2019-05-21 NOTE — PROGRESS NOTES
Date of Office Visit: 2019  Encounter Provider: Beth Butcher MD  Place of Service: [facility] CARDIOLOGY  Patient Name: Helena Carmen  :1931      Patient ID:  Helena Carmen is a 88 y.o. female is here for  followup for         History of Present Illness    She was originally seen for a pericardial effusion noted on a CT scan of the abdomen and  pelvis. On her initial exam there was a murmur, and she complained of dyspnea. She had a  PET stress study performed in 2010 which showed no ischemia. She had an echocardiogram  the same day which showed an ejection fraction of 67%, mild concentric left ventricular  hypertrophy, grade IA diastolic dysfunction, moderate to marked left atrial enlargement,  moderate aortic stenosis with a peak gradient of 42 mmHg and a mean gradient of 24 mmHg.   There was mild mitral and tricuspid insufficiency, with a small pericardial effusion. Her  carotid duplex study also performed on that day was normal as well.      She had increasing fatigue and dyspnea and was able to do very little, which she thought was due to her back pain.   She had a 2-D echocardiogram with Doppler in 2013. Her ejection   fraction was 58%. There was severe aortic stenosis with valvular area of 0.6 mm cubed, a   peak gradient of 71 with a mean of 41 mmHg. There was also moderate mitral insufficiency, severe   left atrial enlargement, right ventricular systolic pressure of 45 mmHg and grade II diastolic   dysfunction. She had a normal carotid duplex in 2013.     She went to [facility] after having a cardiac catheterization done. Her cardiac  catheterization showed minimal coronary artery disease. Dr. Murry saw her in the valve  clinic and agreed that she needed aortic valvular replacement. Dr. Lagunas saw her and  was in agreement as well. On 2013, she had a 21 mm [employer] Mitch Trifecta bovine  pericardial valve placed. Postoperatively, she had renal  insufficiency and atrial  fibrillation which have gotten better. She had some encephalopathy postoperatively but  went to [facility] and did rehabilitation and did quite well.     She had PVCs. We did check a Holter monitor and there was no evidence of atrial fibrillation there so we  were able to stop her warfarin.      She was in the hospital with C. Difficile colitis from 07/30/2013 to 08/09/2013. She also had klebsiella urinary tract infection  and was treated with Levaquin. She was in acute renal failure. She had some pancreatitis  at that time as well. During her hospitalization we did see  her and her cardiac status was stable. She had a 2-D echocardiogram with Doppler  performed on 08/01/2013 which revealed an ejection fraction of 51%, mild-to-moderate left  ventricular hypertrophy, grade II diastolic dysfunction, moderate-to-severe left atrial  enlargement, mild-to-moderate mitral insufficiency, mild tricuspid insufficiency, and a  tissue-type aortic valve which was functioning quite well without stenosis or  insufficiency.     She is sedentary due to her blindness.      Lab values done 3/22/2019 show TSH normal 2.3, hemoglobin 11.8, creatinine 1.2, potassium 4.5, blood glucose 228, otherwise normal CMP.    I rechecked her blood pressure here in the office and got 138/62.  She denies chest pain or difficulty breathing.  She does get fatigued and somewhat winded with activity.  She does not feel her heart racing or skipping.  She is had no dizziness or nausea.  She is had no falls.  She denies orthopnea or PND.    Past Medical History:   Diagnosis Date   • Aortic valve stenosis     s/p tissue AVR   • Back pain    • Diastolic dysfunction     Grade 2 per echocardiogram 2013   • Diverticulosis    • Exertional shortness of breath    • Fatigue    • Hiatal hernia    • Hyperlipidemia    • Hypertension    • Hyperthyroidism    • Hypertriglyceridemia 5/31/2018   • Hypothyroidism    • Left ventricular hypertrophy    •  Legally blind    • Macular degeneration    • Mitral regurgitation    • Osteoarthritis of hip    • Paroxysmal atrial fibrillation (CMS/HCC)    • Premature ventricular contractions    • Pulmonary hypertension (CMS/HCC)    • Renal insufficiency syndrome    • Type 2 diabetes mellitus (CMS/HCC)     type 2         Past Surgical History:   Procedure Laterality Date   • AORTIC VALVE REPAIR/REPLACEMENT     • CATARACT EXTRACTION      1970, 1999   • ENDOSCOPY  08/15/2014    no gross lesions in stomach/duodenum, erythrematous mucosa in stomach   • HYSTERECTOMY  2007   • STERNOTOMY         Current Outpatient Medications on File Prior to Visit   Medication Sig Dispense Refill   • amLODIPine (NORVASC) 10 MG tablet Take 1 tablet by mouth daily.     • aspirin 81 MG tablet Take by mouth daily.     • fenofibrate (TRICOR) 145 MG tablet Take 1 tablet by mouth daily.     • Ferrous Sulfate (IRON) 325 (65 FE) MG tablet Take 1 tablet by mouth 2 (two) times a day with meals.     • gabapentin (NEURONTIN) 600 MG tablet Take 1 tablet by mouth 2 (two) times a day.     • glyBURIDE-metFORMIN (GLUCOVANCE) 5-500 MG per tablet Take 2 tablets by mouth 2 (two) times a day.     • insulin glargine (LANTUS) 100 UNIT/ML injection Inject 12 Units under the skin into the appropriate area as directed Daily.     • isosorbide mononitrate (IMDUR) 60 MG 24 hr tablet Take 2 tablets by mouth daily.     • levothyroxine (SYNTHROID) 25 MCG tablet Take 1 tablet by mouth daily.     • linagliptin (TRADJENTA) 5 MG tablet tablet Take 5 mg by mouth daily.     • loratadine (CLARITIN) 10 MG tablet Take 1 tablet by mouth daily.     • LORazepam (ATIVAN) 0.5 MG tablet Take 1 tablet by mouth as needed.     • losartan (COZAAR) 100 MG tablet Take 1 tablet by mouth daily.     • metoclopramide (REGLAN) 10 MG tablet Take 1 tablet by mouth 3 (Three) Times a Day Before Meals. 270 tablet 3   • metoprolol tartrate (LOPRESSOR) 50 MG tablet Take 1 tablet by mouth every 12 (twelve) hours.      • montelukast (SINGULAIR) 10 MG tablet Take 1 tablet by mouth daily.     • Multiple Vitamin tablet Take 1 tablet by mouth daily.     • pantoprazole (PROTONIX) 40 MG EC tablet Take 40 mg by mouth Daily.     • rosuvastatin (CRESTOR) 20 MG tablet Take 20 mg by mouth Daily.     • Sennosides (SENOKOT PO) Take by mouth.     • silodosin (RAPAFLO) 8 MG capsule Take 4 mg by mouth daily with breakfast.     • spironolactone (ALDACTONE) 25 MG tablet Take 0.5 tablets by mouth daily.     • Vitamin E 400 UNITS tablet Take 1 tablet by mouth daily.     • [DISCONTINUED] aspirin 81 MG EC tablet        No current facility-administered medications on file prior to visit.        Social History     Socioeconomic History   • Marital status:      Spouse name: Not on file   • Number of children: Not on file   • Years of education: Not on file   • Highest education level: Not on file   Tobacco Use   • Smoking status: Former Smoker   • Smokeless tobacco: Never Used   • Tobacco comment: caffeine use   Substance and Sexual Activity   • Alcohol use: No   • Drug use: No           Review of Systems   Constitution: Negative.   HENT: Negative for congestion.    Eyes: Negative for vision loss in left eye and vision loss in right eye.   Respiratory: Negative.  Negative for cough, hemoptysis, shortness of breath, sleep disturbances due to breathing, snoring, sputum production and wheezing.    Endocrine: Negative.    Hematologic/Lymphatic: Negative.    Skin: Negative for poor wound healing and rash.   Musculoskeletal: Negative for falls, gout, muscle cramps and myalgias.   Gastrointestinal: Negative for abdominal pain, diarrhea, dysphagia, hematemesis, melena, nausea and vomiting.   Neurological: Negative for excessive daytime sleepiness, dizziness, headaches, light-headedness, loss of balance, seizures and vertigo.   Psychiatric/Behavioral: Negative for depression and substance abuse. The patient is not nervous/anxious.        Procedures    ECG  "12 Lead  Date/Time: 5/21/2019 11:27 AM  Performed by: Beth Butcher MD  Authorized by: Beth Butcher MD   Comparison: compared with previous ECG   Similar to previous ECG  Rhythm: sinus rhythm    Clinical impression: normal ECG                Objective:      Vitals:    05/21/19 1108 05/21/19 1136   BP: 170/70 138/62   BP Location: Right arm Right arm   Patient Position: Sitting    Pulse: 66    Weight: 77.1 kg (170 lb)    Height: 144.8 cm (57\")      Body mass index is 36.79 kg/m².    Physical Exam   Constitutional: She is oriented to person, place, and time. She appears well-developed and well-nourished. No distress.   HENT:   Head: Normocephalic and atraumatic.   Eyes: Conjunctivae are normal. No scleral icterus.   Neck: Neck supple. No JVD present. Carotid bruit is not present. No thyromegaly present.   Cardiovascular: Normal rate, regular rhythm, S1 normal, S2 normal, normal heart sounds and intact distal pulses.  No extrasystoles are present. PMI is not displaced. Exam reveals no gallop.   No murmur heard.  Pulses:       Carotid pulses are 2+ on the right side, and 2+ on the left side.       Radial pulses are 2+ on the right side, and 2+ on the left side.        Dorsalis pedis pulses are 2+ on the right side, and 2+ on the left side.        Posterior tibial pulses are 2+ on the right side, and 2+ on the left side.   Pulmonary/Chest: Effort normal and breath sounds normal. No respiratory distress. She has no wheezes. She has no rhonchi. She has no rales. She exhibits no tenderness.   Abdominal: Soft. Bowel sounds are normal. She exhibits no distension, no abdominal bruit and no mass. There is no tenderness.   Musculoskeletal: She exhibits no edema or deformity.   Lymphadenopathy:     She has no cervical adenopathy.   Neurological: She is alert and oriented to person, place, and time. No cranial nerve deficit.   Skin: Skin is warm and dry. No rash noted. She is not diaphoretic. No cyanosis. No " pallor. Nails show no clubbing.   Psychiatric: She has a normal mood and affect. Judgment normal.   Vitals reviewed.      Lab Review:       Assessment:      Diagnosis Plan   1. S/P AVR  Adult Transthoracic Echo Complete W/ Cont if Necessary Per Protocol   2. Essential hypertension  Adult Transthoracic Echo Complete W/ Cont if Necessary Per Protocol   3. Diastolic dysfunction  Adult Transthoracic Echo Complete W/ Cont if Necessary Per Protocol     1. Severe aortic stenosis, status post AVR [employer] Mitch Trifecta bovine   pericardial valve, 21 mm on 06/08/2013. Doing well.   2. Normal carotid duplex 03/2013.  3. Small hiatal hernia. Followed by Dr. Freddy Martel.  4. Diverticular disease. Followed by Dr. Freddy Martel.  5. Diabetes mellitus, type 2. Followed by Dr. Hickman.   6. Grade II diastolic dysfunction.  7. Hypothyroidism.  8. 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  9. Hyperlipidemia. The patient is on Zocor.  10. Family history of cardiovascular disease.  11. No significant CAD on cath 05/2013.   12. Ventricular bigeminy. Stable.   13. Post operative atrial fibrillation, no recurrence, off of warfarin.  14. C. difficile colitis 07/2013.  15. HTN, BP controlled.      Plan:       Repeat echo and no med changes, see kit in 1year.

## 2019-05-28 ENCOUNTER — TELEPHONE (OUTPATIENT)
Dept: GASTROENTEROLOGY | Facility: CLINIC | Age: 84
End: 2019-05-28

## 2019-05-28 NOTE — TELEPHONE ENCOUNTER
Called pt and pt reports that she is 50-70 percent improved. She still has an occasional run of diarrhea but it is a sm amount. She does say this can keep her in her bathroom for a long while but she has improved.  Advised would update  Siri NP.Pt verb understanding.

## 2019-05-28 NOTE — TELEPHONE ENCOUNTER
----- Message from Shiraz Carson sent at 5/28/2019  3:32 PM EDT -----  Regarding: pt called to give an update   Contact: [phone]  ..

## 2019-06-07 ENCOUNTER — HOSPITAL ENCOUNTER (OUTPATIENT)
Dept: CARDIOLOGY | Facility: HOSPITAL | Age: 84
Discharge: HOME | End: 2019-06-07
Admitting: INTERNAL MEDICINE

## 2019-06-07 VITALS
WEIGHT: 170 LBS | BODY MASS INDEX: 36.68 KG/M2 | SYSTOLIC BLOOD PRESSURE: 138 MMHG | DIASTOLIC BLOOD PRESSURE: 62 MMHG | HEIGHT: 57 IN

## 2019-06-07 DIAGNOSIS — Z95.2 S/P AVR: ICD-10-CM

## 2019-06-07 DIAGNOSIS — I10 ESSENTIAL HYPERTENSION: ICD-10-CM

## 2019-06-07 DIAGNOSIS — I51.89 DIASTOLIC DYSFUNCTION: ICD-10-CM

## 2019-06-07 LAB
AORTIC ROOT ANNULUS: 2.2 CM
ASCENDING AORTA: 3.8 CM
BH CV ECHO MEAS - ACS: 1.4 CM
BH CV ECHO MEAS - AO MAX PG (FULL): 15.1 MMHG
BH CV ECHO MEAS - AO MAX PG: 19.7 MMHG
BH CV ECHO MEAS - AO MEAN PG (FULL): 7 MMHG
BH CV ECHO MEAS - AO MEAN PG: 9.8 MMHG
BH CV ECHO MEAS - AO V2 MAX: 222.2 CM/SEC
BH CV ECHO MEAS - AO V2 MEAN: 140.5 CM/SEC
BH CV ECHO MEAS - AO V2 VTI: 53.9 CM
BH CV ECHO MEAS - ASC AORTA: 3.8 CM
BH CV ECHO MEAS - AVA(I,A): 1.2 CM^2
BH CV ECHO MEAS - AVA(I,D): 1.2 CM^2
BH CV ECHO MEAS - AVA(V,A): 1.1 CM^2
BH CV ECHO MEAS - AVA(V,D): 1.1 CM^2
BH CV ECHO MEAS - BSA(HAYCOCK): 1.8 M^2
BH CV ECHO MEAS - BSA: 1.7 M^2
BH CV ECHO MEAS - BZI_BMI: 36.8 KILOGRAMS/M^2
BH CV ECHO MEAS - BZI_METRIC_HEIGHT: 144.8 CM
BH CV ECHO MEAS - BZI_METRIC_WEIGHT: 77.1 KG
BH CV ECHO MEAS - EDV(MOD-SP2): 70 ML
BH CV ECHO MEAS - EDV(MOD-SP4): 67 ML
BH CV ECHO MEAS - EDV(TEICH): 146.5 ML
BH CV ECHO MEAS - EF(CUBED): 75 %
BH CV ECHO MEAS - EF(MOD-BP): 56 %
BH CV ECHO MEAS - EF(MOD-SP2): 57.1 %
BH CV ECHO MEAS - EF(MOD-SP4): 53.7 %
BH CV ECHO MEAS - EF(TEICH): 66.3 %
BH CV ECHO MEAS - ESV(MOD-SP2): 30 ML
BH CV ECHO MEAS - ESV(MOD-SP4): 31 ML
BH CV ECHO MEAS - ESV(TEICH): 49.4 ML
BH CV ECHO MEAS - IVS/LVPW: 0.92
BH CV ECHO MEAS - IVSD: 1.1 CM
BH CV ECHO MEAS - LAT PEAK E' VEL: 7 CM/SEC
BH CV ECHO MEAS - LV DIASTOLIC VOL/BSA (35-75): 39.9 ML/M^2
BH CV ECHO MEAS - LV MASS(C)D: 256.6 GRAMS
BH CV ECHO MEAS - LV MASS(C)DI: 152.9 GRAMS/M^2
BH CV ECHO MEAS - LV MAX PG: 4.7 MMHG
BH CV ECHO MEAS - LV MEAN PG: 2.8 MMHG
BH CV ECHO MEAS - LV SYSTOLIC VOL/BSA (12-30): 18.5 ML/M^2
BH CV ECHO MEAS - LV V1 MAX: 108.3 CM/SEC
BH CV ECHO MEAS - LV V1 MEAN: 78.5 CM/SEC
BH CV ECHO MEAS - LV V1 VTI: 29.6 CM
BH CV ECHO MEAS - LVIDD: 5.5 CM
BH CV ECHO MEAS - LVIDS: 3.5 CM
BH CV ECHO MEAS - LVLD AP2: 6.8 CM
BH CV ECHO MEAS - LVLD AP4: 7 CM
BH CV ECHO MEAS - LVLS AP2: 6.5 CM
BH CV ECHO MEAS - LVLS AP4: 6.1 CM
BH CV ECHO MEAS - LVOT AREA (M): 2.3 CM^2
BH CV ECHO MEAS - LVOT AREA: 2.2 CM^2
BH CV ECHO MEAS - LVOT DIAM: 1.7 CM
BH CV ECHO MEAS - LVPWD: 1.2 CM
BH CV ECHO MEAS - MED PEAK E' VEL: 6 CM/SEC
BH CV ECHO MEAS - MR MAX PG: 90.5 MMHG
BH CV ECHO MEAS - MR MAX VEL: 475.6 CM/SEC
BH CV ECHO MEAS - MV A DUR: 0.1 SEC
BH CV ECHO MEAS - MV A MAX VEL: 86.3 CM/SEC
BH CV ECHO MEAS - MV DEC SLOPE: 502.5 CM/SEC^2
BH CV ECHO MEAS - MV DEC TIME: 0.23 SEC
BH CV ECHO MEAS - MV E MAX VEL: 125 CM/SEC
BH CV ECHO MEAS - MV E/A: 1.4
BH CV ECHO MEAS - MV MAX PG: 8.4 MMHG
BH CV ECHO MEAS - MV MEAN PG: 3 MMHG
BH CV ECHO MEAS - MV P1/2T MAX VEL: 130.4 CM/SEC
BH CV ECHO MEAS - MV P1/2T: 76 MSEC
BH CV ECHO MEAS - MV V2 MAX: 145.2 CM/SEC
BH CV ECHO MEAS - MV V2 MEAN: 78.4 CM/SEC
BH CV ECHO MEAS - MV V2 VTI: 37.8 CM
BH CV ECHO MEAS - MVA P1/2T LCG: 1.7 CM^2
BH CV ECHO MEAS - MVA(P1/2T): 2.9 CM^2
BH CV ECHO MEAS - MVA(VTI): 1.7 CM^2
BH CV ECHO MEAS - PA ACC TIME: 0.17 SEC
BH CV ECHO MEAS - PA MAX PG (FULL): 2 MMHG
BH CV ECHO MEAS - PA MAX PG: 4.4 MMHG
BH CV ECHO MEAS - PA PR(ACCEL): 1.4 MMHG
BH CV ECHO MEAS - PA V2 MAX: 104.5 CM/SEC
BH CV ECHO MEAS - PI END-D VEL: 124.9 CM/SEC
BH CV ECHO MEAS - PULM A REVS DUR: 0.12 SEC
BH CV ECHO MEAS - PULM A REVS VEL: 22.3 CM/SEC
BH CV ECHO MEAS - PULM DIAS VEL: 45.1 CM/SEC
BH CV ECHO MEAS - PULM S/D: 0.84
BH CV ECHO MEAS - PULM SYS VEL: 37.8 CM/SEC
BH CV ECHO MEAS - PVA(V,A): 2.4 CM^2
BH CV ECHO MEAS - PVA(V,D): 2.4 CM^2
BH CV ECHO MEAS - QP/QS: 1
BH CV ECHO MEAS - RAP SYSTOLE: 8 MMHG
BH CV ECHO MEAS - RV MAX PG: 2.4 MMHG
BH CV ECHO MEAS - RV MEAN PG: 1.4 MMHG
BH CV ECHO MEAS - RV V1 MAX: 77.6 CM/SEC
BH CV ECHO MEAS - RV V1 MEAN: 56.2 CM/SEC
BH CV ECHO MEAS - RV V1 VTI: 20.8 CM
BH CV ECHO MEAS - RVOT AREA: 3.2 CM^2
BH CV ECHO MEAS - RVOT DIAM: 2 CM
BH CV ECHO MEAS - RVSP: 69.1 MMHG
BH CV ECHO MEAS - SI(CUBED): 73.7 ML/M^2
BH CV ECHO MEAS - SI(LVOT): 39.2 ML/M^2
BH CV ECHO MEAS - SI(MOD-SP2): 23.8 ML/M^2
BH CV ECHO MEAS - SI(MOD-SP4): 21.4 ML/M^2
BH CV ECHO MEAS - SI(TEICH): 57.9 ML/M^2
BH CV ECHO MEAS - SUP REN AO DIAM: 1.9 CM
BH CV ECHO MEAS - SV(CUBED): 123.7 ML
BH CV ECHO MEAS - SV(LVOT): 65.7 ML
BH CV ECHO MEAS - SV(MOD-SP2): 40 ML
BH CV ECHO MEAS - SV(MOD-SP4): 36 ML
BH CV ECHO MEAS - SV(RVOT): 67.2 ML
BH CV ECHO MEAS - SV(TEICH): 97.1 ML
BH CV ECHO MEAS - TAPSE (>1.6): 2.1 CM2
BH CV ECHO MEAS - TR MAX VEL: 390.9 CM/SEC
BH CV ECHO MEASUREMENTS AVERAGE E/E' RATIO: 19.23
BH CV XLRA - RV BASE: 3.4 CM
BH CV XLRA - TDI S': 12 CM/SEC
LEFT ATRIUM VOLUME INDEX: 47 ML/M2
MAXIMAL PREDICTED HEART RATE: 132 BPM
SINUS: 3.8 CM
STJ: 3.4 CM
STRESS TARGET HR: 112 BPM

## 2019-06-07 PROCEDURE — 93306 TTE W/DOPPLER COMPLETE: CPT

## 2019-06-07 PROCEDURE — 93306 TTE W/DOPPLER COMPLETE: CPT | Performed by: INTERNAL MEDICINE

## 2019-06-13 ENCOUNTER — TELEPHONE (OUTPATIENT)
Dept: CARDIOLOGY | Facility: CLINIC | Age: 84
End: 2019-06-13

## 2019-06-14 DIAGNOSIS — I50.32 CHRONIC DIASTOLIC CONGESTIVE HEART FAILURE (HCC): Primary | ICD-10-CM

## 2019-06-14 RX ORDER — FUROSEMIDE 40 MG/1
40 TABLET ORAL DAILY
Qty: 90 TABLET | Refills: 3 | Status: SHIPPED | OUTPATIENT
Start: 2019-06-14 | End: 2020-06-25

## 2019-06-25 ENCOUNTER — LAB (OUTPATIENT)
Dept: LAB | Facility: HOSPITAL | Age: 84
End: 2019-06-25

## 2019-06-25 DIAGNOSIS — I50.32 CHRONIC DIASTOLIC CONGESTIVE HEART FAILURE (HCC): ICD-10-CM

## 2019-06-25 LAB
ANION GAP SERPL CALCULATED.3IONS-SCNC: 17.2 MMOL/L
BUN BLD-MCNC: 26 MG/DL (ref 8–23)
BUN/CREAT SERPL: 15.3 (ref 7–25)
CALCIUM SPEC-SCNC: 9.5 MG/DL (ref 8.6–10.5)
CHLORIDE SERPL-SCNC: 99 MMOL/L (ref 98–107)
CO2 SERPL-SCNC: 20.8 MMOL/L (ref 22–29)
CREAT BLD-MCNC: 1.7 MG/DL (ref 0.57–1)
GFR SERPL CREATININE-BSD FRML MDRD: 28 ML/MIN/1.73
GLUCOSE BLD-MCNC: 199 MG/DL (ref 65–99)
POTASSIUM BLD-SCNC: 4.4 MMOL/L (ref 3.5–5.2)
SODIUM BLD-SCNC: 137 MMOL/L (ref 136–145)

## 2019-06-25 PROCEDURE — 36415 COLL VENOUS BLD VENIPUNCTURE: CPT

## 2019-06-25 PROCEDURE — 80048 BASIC METABOLIC PNL TOTAL CA: CPT

## 2019-07-10 ENCOUNTER — OFFICE VISIT (OUTPATIENT)
Dept: CARDIOLOGY | Facility: CLINIC | Age: 84
End: 2019-07-10

## 2019-07-10 ENCOUNTER — LAB (OUTPATIENT)
Dept: LAB | Facility: HOSPITAL | Age: 84
End: 2019-07-10

## 2019-07-10 VITALS
WEIGHT: 167 LBS | DIASTOLIC BLOOD PRESSURE: 62 MMHG | SYSTOLIC BLOOD PRESSURE: 162 MMHG | HEART RATE: 70 BPM | BODY MASS INDEX: 36.03 KG/M2 | HEIGHT: 57 IN

## 2019-07-10 DIAGNOSIS — I10 ESSENTIAL HYPERTENSION: ICD-10-CM

## 2019-07-10 DIAGNOSIS — Z51.81 THERAPEUTIC DRUG MONITORING: ICD-10-CM

## 2019-07-10 DIAGNOSIS — Z95.2 S/P AVR: Primary | ICD-10-CM

## 2019-07-10 DIAGNOSIS — I50.32 CHRONIC DIASTOLIC CONGESTIVE HEART FAILURE (HCC): ICD-10-CM

## 2019-07-10 DIAGNOSIS — I48.0 PAROXYSMAL ATRIAL FIBRILLATION (HCC): ICD-10-CM

## 2019-07-10 DIAGNOSIS — H54.8 LEGALLY BLIND: ICD-10-CM

## 2019-07-10 DIAGNOSIS — N28.9 RENAL INSUFFICIENCY: ICD-10-CM

## 2019-07-10 DIAGNOSIS — I51.89 DIASTOLIC DYSFUNCTION: ICD-10-CM

## 2019-07-10 LAB
ANION GAP SERPL CALCULATED.3IONS-SCNC: 16.3 MMOL/L (ref 5–15)
BUN BLD-MCNC: 35 MG/DL (ref 8–23)
BUN/CREAT SERPL: 20.5 (ref 7–25)
CALCIUM SPEC-SCNC: 9.6 MG/DL (ref 8.6–10.5)
CHLORIDE SERPL-SCNC: 94 MMOL/L (ref 98–107)
CO2 SERPL-SCNC: 22.7 MMOL/L (ref 22–29)
CREAT BLD-MCNC: 1.71 MG/DL (ref 0.57–1)
GFR SERPL CREATININE-BSD FRML MDRD: 28 ML/MIN/1.73
GLUCOSE BLD-MCNC: 287 MG/DL (ref 65–99)
NT-PROBNP SERPL-MCNC: 809.2 PG/ML (ref 5–1800)
POTASSIUM BLD-SCNC: 4.7 MMOL/L (ref 3.5–5.2)
SODIUM BLD-SCNC: 133 MMOL/L (ref 136–145)

## 2019-07-10 PROCEDURE — 83880 ASSAY OF NATRIURETIC PEPTIDE: CPT | Performed by: NURSE PRACTITIONER

## 2019-07-10 PROCEDURE — 80048 BASIC METABOLIC PNL TOTAL CA: CPT

## 2019-07-10 PROCEDURE — 93000 ELECTROCARDIOGRAM COMPLETE: CPT | Performed by: NURSE PRACTITIONER

## 2019-07-10 PROCEDURE — 36415 COLL VENOUS BLD VENIPUNCTURE: CPT

## 2019-07-10 PROCEDURE — 99214 OFFICE O/P EST MOD 30 MIN: CPT | Performed by: NURSE PRACTITIONER

## 2019-07-10 NOTE — PROGRESS NOTES
Date of Office Visit: 07/10/2019  Encounter Provider: RIGO Barone  Place of Service: [facility] CARDIOLOGY  Patient Name: Helena Carmen  :1931    Chief Complaint   Patient presents with   • Congestive Heart Failure   • Cardiac Valve Problem   • Follow-up   :     HPI: Helena Carmen is a 88 y.o. female is a patient of Dr. Butcher. I will be seeing her for the first time today and have reviewed her medical record.     Her past medical history is significant of severe aortic stenosis status post aortic valve replacement with a bovine pericardial valve, postoperative atrial fibrillation, hypertension, hyperlipidemia, and diastolic dysfunction.     she was noted to have severe aortic stenosis with aortic valve area of 0.6mm² the peak gradient of 71 mmHg and mean gradient of 41 mmHg.  There was also moderate mitral insufficiency, severe left atrial enlargement, RVSP 45 mmHg and grade 2 diastolic dysfunction.  She had a cardiac catheterization which showed minimal coronary disease.  She ultimately had aortic valve replacement with a [place] trifecta bovine pericardial valve 2013.  Postoperatively, she had some renal insufficiency and atrial fibrillation which improved.  She was noted to have premature ventricular contraction and wore Holter monitor which showed no evidence of atrial fibrillation so her warfarin was stopped.  She has C. difficile colitis in approximately 2013.  She also had Klebsiella urinary tract infection and some renal insufficiency.  She developed pancreatitis.  She had a repeat echocardiogram which showed normal left ventricular systolic function, mild to moderate left ventricular hypertrophy, grade 2 diastolic dysfunction, moderate to severe left atrial enlargement, mild to moderate mitral insufficiency, mild tricuspid insufficiency, and a tissue aortic valve functioning well without stenosis or insufficiency.  She was noted to have  pericardial effusion which was mostly small but moderate posterior laterally.  Repeat echocardiogram 6/7/2019 showed normal left ventricular systolic function with an EF of 56% and mild concentric hypertrophy.  There was grade 2 diastolic dysfunction left atrial cavity moderate to severely dilated, mild mitral regurgitation with moderate tricuspid regurgitation.  RVSP 69 mmHg and pericardial aortic valve with gradients within defined limits and normal.  There was a trivial pericardial effusion.  Started on Lasix and had a follow-up BMP which showed increased creatinine but stable BUN.    Patient presents today for reevaluation.  She has not lost any weight.  They are concerned about the increased kidney function.  She has an occasional atypical pain in the left side of her chest when she applies pressure that relieves it.  She denies any changes in her dyspnea or lower extremity edema.  Apparently she was not any more short of breath than usual prior to starting Lasix.  She denies dizziness, palpitation, edema, near-syncope, or syncope.  She is accompanied by her daughter today.  She normally follows with Dr. Oziel Wick for nephrology.      Allergies   Allergen Reactions   • Erythromycin Unknown (See Comments)     Pt states she does not remember but it was many years ago   • Keflex [Cephalexin] Unknown (See Comments)     Pt states she does not remember reaction but it was many years ago    • Penicillins Rash   • Sulfa Antibiotics Itching and Rash       Past Medical History:   Diagnosis Date   • Aortic valve stenosis     s/p tissue AVR   • Back pain    • Diastolic dysfunction     Grade 2 per echocardiogram 2013   • Diverticulosis    • Exertional shortness of breath    • Fatigue    • Hiatal hernia    • Hyperlipidemia    • Hypertension    • Hyperthyroidism    • Hypertriglyceridemia 5/31/2018   • Hypothyroidism    • Left ventricular hypertrophy    • Legally blind    • Macular degeneration    • Mitral regurgitation    •  "Osteoarthritis of hip    • Paroxysmal atrial fibrillation (CMS/HCC)    • Premature ventricular contractions    • Pulmonary hypertension (CMS/HCC)    • Renal insufficiency syndrome    • Type 2 diabetes mellitus (CMS/HCC)     type 2       Past Surgical History:   Procedure Laterality Date   • AORTIC VALVE REPAIR/REPLACEMENT     • CATARACT EXTRACTION      1970, 1999   • ENDOSCOPY  08/15/2014    no gross lesions in stomach/duodenum, erythrematous mucosa in stomach   • HYSTERECTOMY  2007   • STERNOTOMY           Family and social history reviewed.     ROS  All other systems were reviewed and are negative          Objective:     Vitals:    07/10/19 1422   BP: 162/62   BP Location: Left arm   Patient Position: Sitting   Pulse: 70   Weight: 75.8 kg (167 lb)   Height: 144.8 cm (57\")     Body mass index is 36.14 kg/m².    PHYSICAL EXAM:  Physical Exam   Constitutional: She is oriented to person, place, and time. She appears well-developed and well-nourished. No distress.   HENT:   Head: Normocephalic.   Eyes: Conjunctivae are normal.   Neck: Normal range of motion. No JVD present.   Cardiovascular: Normal rate, regular rhythm, normal heart sounds and intact distal pulses.   No murmur heard.  Pulses:       Carotid pulses are 2+ on the right side, and 2+ on the left side.       Radial pulses are 2+ on the right side, and 2+ on the left side.        Posterior tibial pulses are 2+ on the right side, and 2+ on the left side.   Pulmonary/Chest: Effort normal and breath sounds normal. No respiratory distress. She has no wheezes. She has no rhonchi. She has no rales. She exhibits no tenderness.   Abdominal: Soft. Bowel sounds are normal. She exhibits no distension.   Musculoskeletal: Normal range of motion. She exhibits edema (trace bilateral ankles and pedal-unchanged).   In WC   Neurological: She is alert and oriented to person, place, and time.   Skin: Skin is warm, dry and intact. No rash noted. She is not diaphoretic. No " cyanosis.   Psychiatric: She has a normal mood and affect. Her behavior is normal. Judgment and thought content normal.         ECG 12 Lead  Date/Time: 7/10/2019 5:28 PM  Performed by: Niya Alamo APRN  Authorized by: Niya Alamo APRN   Comparison: compared with previous ECG from 5/21/2019  Similar to previous ECG  Rhythm: sinus rhythm  T inversion: III and aVF    Clinical impression: abnormal EKG  Comments: T wave changes inferior leads            Current Outpatient Medications   Medication Sig Dispense Refill   • amLODIPine (NORVASC) 10 MG tablet Take 1 tablet by mouth daily.     • aspirin 81 MG tablet Take by mouth daily.     • fenofibrate (TRICOR) 145 MG tablet Take 1 tablet by mouth daily.     • Ferrous Sulfate (IRON) 325 (65 FE) MG tablet Take 1 tablet by mouth 2 (two) times a day with meals.     • furosemide (LASIX) 40 MG tablet Take 1 tablet by mouth Daily. 90 tablet 3   • gabapentin (NEURONTIN) 600 MG tablet Take 1 tablet by mouth 2 (two) times a day.     • glyBURIDE-metFORMIN (GLUCOVANCE) 5-500 MG per tablet Take 2 tablets by mouth 2 (two) times a day.     • insulin glargine (LANTUS) 100 UNIT/ML injection Inject 12 Units under the skin into the appropriate area as directed Daily.     • isosorbide mononitrate (IMDUR) 60 MG 24 hr tablet Take 2 tablets by mouth daily.     • levothyroxine (SYNTHROID) 25 MCG tablet Take 1 tablet by mouth daily.     • linagliptin (TRADJENTA) 5 MG tablet tablet Take 5 mg by mouth daily.     • loratadine (CLARITIN) 10 MG tablet Take 1 tablet by mouth daily.     • LORazepam (ATIVAN) 0.5 MG tablet Take 1 tablet by mouth as needed.     • losartan (COZAAR) 100 MG tablet Take one half tablet by mouth twice daily     • metoclopramide (REGLAN) 10 MG tablet Take 1 tablet by mouth 3 (Three) Times a Day Before Meals. 270 tablet 3   • metoprolol tartrate (LOPRESSOR) 50 MG tablet Take 1 tablet by mouth every 12 (twelve) hours.     • montelukast (SINGULAIR) 10 MG tablet Take 1 tablet by  mouth daily.     • Multiple Vitamin tablet Take 1 tablet by mouth daily.     • pantoprazole (PROTONIX) 40 MG EC tablet Take 40 mg by mouth Daily.     • rosuvastatin (CRESTOR) 20 MG tablet Take 20 mg by mouth Daily.     • silodosin (RAPAFLO) 8 MG capsule Take 4 mg by mouth daily with breakfast.     • spironolactone (ALDACTONE) 25 MG tablet Take 0.5 tablets by mouth daily.     • Vitamin E 400 UNITS tablet Take 1 tablet by mouth daily.       No current facility-administered medications for this visit.      Assessment:       Diagnosis Plan   1. S/P AVR     2. Chronic diastolic congestive heart failure (CMS/HCC)  Basic Metabolic Panel    proBNP   3. Diastolic dysfunction     4. Paroxysmal atrial fibrillation (CMS/HCC)     5. Therapeutic drug monitoring  Basic Metabolic Panel   6. Essential hypertension     7. Legally blind     8. Renal insufficiency          Orders Placed This Encounter   Procedures   • Basic Metabolic Panel     Standing Status:   Future     Number of Occurrences:   1     Standing Expiration Date:   7/10/2020   • proBNP   • ECG 12 Lead     This order was created via procedure documentation         Plan:     1. Severe aortic stenosis, status post AVR [place] Trifecta bovine last echo June 2019 normal left ventricular systolic function valve with gradients within defined limits and normal. pericardial valve, 21 mm on 06/08/2013.   2. Normal carotid duplex 03/2013.  3. Small hiatal hernia. Followed by Dr. Freddy Martel.  Likely the cause of her atypical pain  4. Diverticular disease. Followed by Dr. Freddy Maretl.  5. Diabetes mellitus, type 2. Followed by Dr. Hickman.   6. Grade II diastolic dysfunction  7. Hypothyroidism.  8. 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  9. Hyperlipidemia. The patient is on rosuvastatin and TriCor  10. Family history of cardiovascular disease.  11. No significant CAD on cath 05/2013.  ECG today does show T wave changes in the inferior leads but patient does not have any new  symptoms continue to follow clinically  12. Ventricular bigeminy. Stable.   13. Post operative atrial fibrillation, no recurrence, off of warfarin.  14. C. difficile colitis 07/2013.  15. HTN, BP controlled.   16.  Pericardial effusion this was moderate in 2013 trivial on most recent echo June 2019  17.  Renal insufficiency followed by DR. Oziel Wick.  This has worsened and likely secondary to recent lasix 40 mg daily which was started.  The patient and her daughter are concerned about her kidney function.  We repeated BMP today.  proBNP was well normal in the 800 range, there was no prior value to compare that to.  Her kidney function has not improved we will stop that and follow her chronic diastolic dysfunction clinically with daily weights and they are to call with 2 to 3 pound gain overnight or 5 pound gain in a week.  You are to follow her respiratory status and if she appears to have increased dyspnea or increased work of breathing to notify our office.      Follow up in 2-3 months with Dr. Butcher            It has been a pleasure to participate in this patient's care.      Thank you,  RIGO Barone      **Leisa Disclaimer:**  Much of this encounter note is an electronic transcription/translation of spoken language to printed text. The electronic translation of spoken language may permit erroneous, or at times, nonsensical words or phrases to be inadvertently transcribed. Although I have reviewed the note for such errors, some may still exist.

## 2020-06-11 ENCOUNTER — TELEPHONE (OUTPATIENT)
Dept: CARDIOLOGY | Facility: CLINIC | Age: 85
End: 2020-06-11

## 2020-06-15 NOTE — TELEPHONE ENCOUNTER
LM declan Fields to call back in regards to pts appt on Mon 6/22/2020    Farhat can you please send them a letter informing them of cancelled appt on Monday 6/22/2020    Thank You :)

## 2020-06-25 ENCOUNTER — OFFICE VISIT (OUTPATIENT)
Dept: CARDIOLOGY | Facility: CLINIC | Age: 85
End: 2020-06-25

## 2020-06-25 VITALS — HEIGHT: 57 IN | BODY MASS INDEX: 34.52 KG/M2 | WEIGHT: 160 LBS

## 2020-06-25 DIAGNOSIS — I48.0 PAROXYSMAL ATRIAL FIBRILLATION (HCC): ICD-10-CM

## 2020-06-25 DIAGNOSIS — I35.0 NONRHEUMATIC AORTIC VALVE STENOSIS: Primary | ICD-10-CM

## 2020-06-25 DIAGNOSIS — I27.20 PULMONARY HYPERTENSION (HCC): ICD-10-CM

## 2020-06-25 DIAGNOSIS — I49.3 PVC (PREMATURE VENTRICULAR CONTRACTION): ICD-10-CM

## 2020-06-25 DIAGNOSIS — I51.89 DIASTOLIC DYSFUNCTION: ICD-10-CM

## 2020-06-25 DIAGNOSIS — I10 ESSENTIAL HYPERTENSION: ICD-10-CM

## 2020-06-25 DIAGNOSIS — N18.30 CKD (CHRONIC KIDNEY DISEASE) STAGE 3, GFR 30-59 ML/MIN (HCC): ICD-10-CM

## 2020-06-25 PROCEDURE — 99442 PR PHYS/QHP TELEPHONE EVALUATION 11-20 MIN: CPT | Performed by: NURSE PRACTITIONER

## 2020-06-25 RX ORDER — ISOSORBIDE MONONITRATE 120 MG/1
120 TABLET, EXTENDED RELEASE ORAL DAILY
COMMUNITY
End: 2022-03-09 | Stop reason: HOSPADM

## 2020-06-25 NOTE — PROGRESS NOTES
Telehealth Visit    Date of Visit: 2020  Encounter Provider: RIGO Giraldo  Place of Service: [facility] CARDIOLOGY  Patient Name: Helena Carmen  :1931  Primary Cardiologist: Dr. Beth Butcher     Chief Complaint   Patient presents with   • Follow-up   :     Dear Dr. Hickman,     HPI: Helena Carmen is a pleasant 89 y.o. female who is an established patient of our practice. Due to COVID-19 virus, I am conducting a telehealth visit via telephone with patient and she has consented to this visit today.      She has a known history of type 2 diabetes mellitus, hypothyroidism, hypertension and hyperlipidemia.      In 2013, she underwent to aortic valve replacement for severe aortic stenosis.  Postoperatively she developed some atrial fibrillation and PVCs.  She was initially treated with warfarin which was discontinued since she did not have any recurrent atrial fibrillation.    Echocardiogram was completed 2019 with the following results: EF 56%, grade 2 diastolic dysfunction, mild LVH, left atrium moderately to severely dilated, moderate mitral valve insufficiency, moderate tricuspid regurgitation, RVSP elevated at 69 mmHg, and normal functioning prosthetic aortic valve.      In 2019, she followed up in our office with RIGO Barone.  At that time she was having some atypical chest pain and chronic shortness of breath which was unchanged.  She had been following up with Dr. Art Wick for chronic kidney disease.  There was concerns about her furosemide affecting her kidney function and this was subsequently discontinued.    Today is her follow-up visit and her daughter is also on the phone call.  She reports chronic shortness of breath and fatigue, both unchanged.  On occasion she will have a quick stabbing chest discomfort around her old incision and it just lasts a second.  She denies internal chest pain, palpitations, dizziness, syncope, or  bleeding.    Past Medical History:   Diagnosis Date   • Aortic valve stenosis     s/p tissue AVR   • Back pain    • Diastolic dysfunction     Grade 2 per echocardiogram    • Diverticulosis    • Exertional shortness of breath    • Hiatal hernia    • Hyperlipidemia    • Hypertension    • Hyperthyroidism    • Hypertriglyceridemia 2018   • Hypothyroidism    • Left ventricular hypertrophy    • Legally blind    • Macular degeneration    • Mitral regurgitation    • Osteoarthritis of hip    • Paroxysmal atrial fibrillation (CMS/HCC)    • Premature ventricular contractions    • Pulmonary hypertension (CMS/HCC)    • Renal insufficiency syndrome    • Type 2 diabetes mellitus (CMS/HCC)        Past Surgical History:   Procedure Laterality Date   • AORTIC VALVE REPAIR/REPLACEMENT     • CATARACT EXTRACTION      ,    • ENDOSCOPY  08/15/2014    no gross lesions in stomach/duodenum, erythrematous mucosa in stomach   • HYSTERECTOMY     • STERNOTOMY         Social History     Socioeconomic History   • Marital status:      Spouse name: Not on file   • Number of children: Not on file   • Years of education: Not on file   • Highest education level: Not on file   Tobacco Use   • Smoking status: Former Smoker     Packs/day: 0.50     Last attempt to quit: 7/10/1969     Years since quittin.9   • Smokeless tobacco: Never Used   Substance and Sexual Activity   • Alcohol use: No     Comment: caffeine use - coffee 2 cups daily   • Drug use: No       Family History   Problem Relation Age of Onset   • Heart disease Mother    • Hypertension Mother    • Stroke Mother    • Diabetes Mother         mellitus   • Other Other         cardiovascular disorder       The following portion of the patient's history were reviewed and updated as appropriate: past medical history, past surgical history, past social history, past family history, allergies, current medications, and problem list.    Review of Systems   Constitution:  Positive for malaise/fatigue. Negative for chills, diaphoresis, fever, night sweats, weight gain and weight loss.   HENT: Negative for hearing loss, nosebleeds, sore throat and tinnitus.    Eyes: Negative for blurred vision, double vision, pain and visual disturbance.   Cardiovascular: Positive for dyspnea on exertion. Negative for chest pain, claudication, cyanosis, irregular heartbeat, leg swelling, near-syncope, orthopnea, palpitations, paroxysmal nocturnal dyspnea and syncope.   Respiratory: Positive for shortness of breath. Negative for cough, hemoptysis, snoring and wheezing.    Endocrine: Negative for cold intolerance, heat intolerance and polyuria.   Hematologic/Lymphatic: Negative for bleeding problem. Does not bruise/bleed easily.   Skin: Negative for color change, dry skin, flushing and itching.   Musculoskeletal: Negative for falls, joint pain, joint swelling, muscle cramps, muscle weakness and myalgias.   Gastrointestinal: Negative for abdominal pain, constipation, heartburn, melena, nausea and vomiting.   Genitourinary: Negative for dysuria and hematuria.   Neurological: Negative for excessive daytime sleepiness, dizziness, light-headedness, loss of balance, numbness, paresthesias, seizures and vertigo.   Psychiatric/Behavioral: Negative for altered mental status, depression, memory loss and substance abuse. The patient does not have insomnia and is not nervous/anxious.    Allergic/Immunologic: Negative for environmental allergies.       Allergies   Allergen Reactions   • Erythromycin Unknown (See Comments)     Pt states she does not remember but it was many years ago   • Keflex [Cephalexin] Unknown (See Comments)     Pt states she does not remember reaction but it was many years ago    • Penicillins Rash   • Sulfa Antibiotics Itching and Rash         Current Outpatient Medications:   •  amLODIPine (NORVASC) 10 MG tablet, Take 1 tablet by mouth daily., Disp: , Rfl:   •  aspirin 81 MG tablet, Take by  mouth daily., Disp: , Rfl:   •  fenofibrate (TRICOR) 145 MG tablet, Take 1 tablet by mouth daily., Disp: , Rfl:   •  Ferrous Sulfate (IRON) 325 (65 FE) MG tablet, Take 1 tablet by mouth Daily., Disp: , Rfl:   •  gabapentin (NEURONTIN) 600 MG tablet, Take 1 tablet by mouth 2 (two) times a day., Disp: , Rfl:   •  glyBURIDE-metFORMIN (GLUCOVANCE) 5-500 MG per tablet, Take 2 tablets by mouth 2 (two) times a day., Disp: , Rfl:   •  insulin glargine (LANTUS) 100 UNIT/ML injection, Inject 12 Units under the skin into the appropriate area as directed Daily., Disp: , Rfl:   •  isosorbide mononitrate (IMDUR) 60 MG 24 hr tablet, Take 120 mg by mouth Daily., Disp: , Rfl:   •  levothyroxine (SYNTHROID) 25 MCG tablet, Take 1 tablet by mouth daily., Disp: , Rfl:   •  linagliptin (TRADJENTA) 5 MG tablet tablet, Take 5 mg by mouth daily., Disp: , Rfl:   •  loratadine (CLARITIN) 10 MG tablet, Take 1 tablet by mouth daily., Disp: , Rfl:   •  LORazepam (ATIVAN) 0.5 MG tablet, Take 1 tablet by mouth as needed., Disp: , Rfl:   •  losartan (COZAAR) 100 MG tablet, Take one half tablet by mouth twice daily, Disp: , Rfl:   •  metoclopramide (REGLAN) 10 MG tablet, Take 1 tablet by mouth 3 (Three) Times a Day Before Meals., Disp: 270 tablet, Rfl: 3  •  metoprolol tartrate (LOPRESSOR) 50 MG tablet, Take 1 tablet by mouth every 12 (twelve) hours., Disp: , Rfl:   •  montelukast (SINGULAIR) 10 MG tablet, Take 1 tablet by mouth daily., Disp: , Rfl:   •  Multiple Vitamin tablet, Take 1 tablet by mouth daily., Disp: , Rfl:   •  pantoprazole (PROTONIX) 40 MG EC tablet, Take 40 mg by mouth Daily., Disp: , Rfl:   •  rosuvastatin (CRESTOR) 20 MG tablet, Take 20 mg by mouth Daily., Disp: , Rfl:   •  silodosin (Rapaflo) 8 MG capsule capsule, Take 4 mg by mouth Daily With Breakfast., Disp: , Rfl:   •  spironolactone (ALDACTONE) 25 MG tablet, Take 0.5 tablets by mouth daily., Disp: , Rfl:   •  Vitamin E 400 UNITS tablet, Take 1 tablet by mouth daily., Disp:  ", Rfl:         Objective:     Vitals:    06/25/20 1335   Weight: 72.6 kg (160 lb)   Height: 144.8 cm (57\")     Body mass index is 34.62 kg/m².    Due to telehealth visit, there was no EKG, vitals, or weight performed in our office.  Vitals/Weight were reported by the patient and conducted at home.     Echocardiogram Interpretation Summary 6/2019    · Calculated right ventricular systolic pressure from tricuspid regurgitation is 69.1 mmHg.  · Left ventricular wall thickness is consistent with mild concentric hypertrophy.  · Calculated EF = 56%.  · Left ventricular systolic function is normal.  · Left ventricular diastolic dysfunction (grade II) consistent with pseudonormalization.  · Left atrial cavity size is moderate-to-severely dilated.  · Mild mitral valve regurgitation is present  · Moderate tricuspid valve regurgitation is present.  · There is a 21 mm [place] bovine pericardial bioprosthetic valve present. The aortic valve peak and mean gradients are within defined limits. The prosthetic valve is normal.         Assessment:       Diagnosis Plan   1. Nonrheumatic aortic valve stenosis     2. Diastolic dysfunction     3. Pulmonary hypertension (CMS/HCC)     4. PVC (premature ventricular contraction)     5. Paroxysmal atrial fibrillation (CMS/HCC)     6. Essential hypertension     7. CKD (chronic kidney disease) stage 3, GFR 30-59 ml/min (CMS/HCC)            Plan:       1.  Aortic Stenosis: Status post tissue aortic valve replacement jn 2013.  Last echocardiogram showed stable aortic valve.    2.  Diastolic Dysfunction: Noted to be grade 2 per echocardiogram.  She is no longer taking furosemide because of her kidney function.  She has chronic dyspnea and fatigue, both unchanged.    3.  Pulmonary Hypertension: Noted to be severe per echocardiogram.    4.  PVCs: Noted in the past.  Denies palpitations.  Continue beta-blocker.    5.  Postoperative Atrial Fibrillation: Previously on warfarin which was discontinued " because she did not have any further evidence of atrial fibrillation.    6. Chronic Kidney Disease: She no longer sees Dr. Art Wick and are following with her PCP.  Daughter states that her kidney function has been stable.  I have requested her last blood work from her PCP office.    7.  Hypertension: Continue to monitor.    8.  I recommended follow-up with Dr. Beth Butcher in 6 months, unless otherwise needed sooner.    This patient has consented to a telehealth visit via telephone. The visit was scheduled as a telephone visit to comply with patient safety concerns in accordance with CDC recommendations.  All vitals recorded within this visit are reported by the patient.  I spent 25 minutes in total including but not limited to the 14 minutes spent in direct conversation with this patient.      ADDENDUM 6/30/2020:   · I reviewed her blood work collected 6/2020.  · CMP except for creatinine 1.53, BUN 21, and phosphatase 26.  Potassium normal at 4.2 and sodium 137.  · Total cholesterol 119, triglycerides elevated at 319, HDL 29, and LDL 26.  She is on statin therapy and fenofibrate  · CBC showed normal hemoglobin hematocrit.  Platelets low at 2 9 and WBC low at 4.3.  · TSH normal 2.06.  · Hemoglobin A1c 8.2.    As always, it has been a pleasure to participate in your patient's care. Thank you.       Sincerely,         RIGO Pedro        Dictated utilizing Dragon dictation

## 2020-06-27 ENCOUNTER — TELEPHONE (OUTPATIENT)
Dept: CARDIOLOGY | Facility: CLINIC | Age: 85
End: 2020-06-27

## 2020-06-27 NOTE — TELEPHONE ENCOUNTER
----- Message from RIGO Elias sent at 6/25/2020  2:18 PM EDT -----    Call daughter to schedule at [phone]    ----- Message -----  From: Luly Pina APRN  Sent: 6/25/2020   2:17 PM EDT  To: RIGO Elias, #      Please schedule a 6-month follow-up visit with Dr. Butcher.  Thank you

## 2020-06-29 ENCOUNTER — TELEPHONE (OUTPATIENT)
Dept: CARDIOLOGY | Facility: CLINIC | Age: 85
End: 2020-06-29

## 2020-06-29 NOTE — TELEPHONE ENCOUNTER
----- Message from Brittany Boo sent at 6/29/2020  9:18 AM EDT -----  Jefferson Mauricio has been scheduled.   Farhat Dickson     ----- Message -----  From: Luly Pina APRN  Sent: 6/25/2020   2:18 PM EDT  To: RIGO Elias, #      Call daughter to schedule at [phone]    ----- Message -----  From: Luly Pina APRN  Sent: 6/25/2020   2:17 PM EDT  To: RIGO Elias, #      Please schedule a 6-month follow-up visit with Dr. Butcher.  Thank you

## 2020-12-11 ENCOUNTER — TELEPHONE (OUTPATIENT)
Dept: GASTROENTEROLOGY | Facility: CLINIC | Age: 85
End: 2020-12-11

## 2020-12-11 NOTE — TELEPHONE ENCOUNTER
Pt last seen in office 05/2019.  Spoke with Siri and advised of pt's daughter message. She recommends pt go to ER. She advised that covid can present this way and also want to make sure that pt is not dehydrated.     Called pt's daughter and advised of the above. Pt's daughter verb understanding.

## 2021-06-16 ENCOUNTER — OFFICE VISIT (OUTPATIENT)
Dept: CARDIOLOGY | Facility: CLINIC | Age: 86
End: 2021-06-16

## 2021-06-16 VITALS
HEIGHT: 57 IN | HEART RATE: 61 BPM | SYSTOLIC BLOOD PRESSURE: 180 MMHG | DIASTOLIC BLOOD PRESSURE: 72 MMHG | WEIGHT: 170 LBS | BODY MASS INDEX: 36.68 KG/M2

## 2021-06-16 DIAGNOSIS — I48.0 PAROXYSMAL ATRIAL FIBRILLATION (HCC): ICD-10-CM

## 2021-06-16 DIAGNOSIS — I10 ESSENTIAL HYPERTENSION: ICD-10-CM

## 2021-06-16 DIAGNOSIS — Z95.2 S/P AVR: Primary | ICD-10-CM

## 2021-06-16 PROCEDURE — 93000 ELECTROCARDIOGRAM COMPLETE: CPT | Performed by: INTERNAL MEDICINE

## 2021-06-16 PROCEDURE — 99214 OFFICE O/P EST MOD 30 MIN: CPT | Performed by: INTERNAL MEDICINE

## 2021-06-16 NOTE — PROGRESS NOTES
Date of Office Visit: 2021  Encounter Provider: Beth Butcher MD  Place of Service: [facility] CARDIOLOGY  Patient Name: Helena Carmen  :[id number]      Patient ID:  Helena Carmen is a 89 y.o. female is here for  followup for         History of Present Illness    She was originally seen for a pericardial effusion noted on a CT scan of the abdomen and  pelvis. On her initial exam there was a murmur, and she complained of dyspnea. She had a  PET stress study performed in 2010 which showed no ischemia. She had an echocardiogram  the same day which showed an ejection fraction of 67%, mild concentric left ventricular  hypertrophy, grade IA diastolic dysfunction, moderate to marked left atrial enlargement,  moderate aortic stenosis with a peak gradient of 42 mmHg and a mean gradient of 24 mmHg.   There was mild mitral and tricuspid insufficiency, with a small pericardial effusion. Her  carotid duplex study also performed on that day was normal as well.      She had increasing fatigue and dyspnea and was able to do very little, which she thought was due to her back pain.   She had a 2-D echocardiogram with Doppler in 2013. Her ejection   fraction was 58%. There was severe aortic stenosis with valvular area of 0.6 mm cubed, a   peak gradient of 71 with a mean of 41 mmHg. There was also moderate mitral insufficiency, severe   left atrial enlargement, right ventricular systolic pressure of 45 mmHg and grade II diastolic   dysfunction. She had a normal carotid duplex in 2013.     She went to [facility] after having a cardiac catheterization done. Her cardiac  catheterization showed minimal coronary artery disease. Dr. Murry saw her in the valve  clinic and agreed that she needed aortic valvular replacement. Dr. Lagunas saw her and  was in agreement as well. On 2013, she had a 21 mm [employer] Trifecta bovine  pericardial valve placed. Postoperatively, she had renal  insufficiency and atrial  fibrillation which have gotten better. She had some encephalopathy postoperatively but  went to [facility] and did rehabilitation and did quite well.     She had PVCs. We did check a Holter monitor and there was no evidence of atrial fibrillation there so we  were able to stop her warfarin.      She was in the hospital with C. Difficile colitis from 07/30/2013 to 08/09/2013. She also had klebsiella urinary tract infection  and was treated with Levaquin. She was in acute renal failure. She had some pancreatitis  at that time as well. During her hospitalization we did see  her and her cardiac status was stable. She had a 2-D echocardiogram with Doppler  performed on 08/01/2013 which revealed an ejection fraction of 51%, mild-to-moderate left  ventricular hypertrophy, grade II diastolic dysfunction, moderate-to-severe left atrial  enlargement, mild-to-moderate mitral insufficiency, mild tricuspid insufficiency, and a  tissue-type aortic valve which was functioning quite well without stenosis or  insufficiency.     She is sedentary due to her blindness.     Echocardiogram was completed 6/7/2019 with the following results: EF 56%, grade 2 diastolic dysfunction, mild LVH, left atrium moderately to severely dilated, moderate mitral valve insufficiency, moderate tricuspid regurgitation, RVSP elevated at 69 mmHg, and normal functioning prosthetic aortic valve.       Her blood pressure is high here today.  I rechecked and got 174/62.  She is not checking her blood pressure at home.  She had unremarkable CBC done 5/14/2021 with lipid panel showing triglycerides 229, total cholesterol 134, HDL 33, LDL 55, CMP with creatinine 1.71, glucose 53, otherwise unremarkable CMP.  She does have spells of chest pain.  They usually last about half a day and go away.  She does not feel her heart racing or skipping.  She is short winded with it.  She has had no dizziness, syncope or falls.  She usually does not take  any medication to make this get better.  She has a good appetite and takes her medications as directed.    Past Medical History:   Diagnosis Date   • Aortic valve stenosis     s/p tissue AVR   • Back pain    • Diastolic dysfunction     Grade 2 per echocardiogram 2013   • Diverticulosis    • Exertional shortness of breath    • Hiatal hernia    • Hyperlipidemia    • Hypertension    • Hyperthyroidism    • Hypertriglyceridemia 5/31/2018   • Hypothyroidism    • Left ventricular hypertrophy    • Legally blind    • Macular degeneration    • Mitral regurgitation    • Osteoarthritis of hip    • Paroxysmal atrial fibrillation (CMS/HCC)    • Premature ventricular contractions    • Pulmonary hypertension (CMS/HCC)    • Renal insufficiency syndrome    • Type 2 diabetes mellitus (CMS/HCC)          Past Surgical History:   Procedure Laterality Date   • AORTIC VALVE REPAIR/REPLACEMENT     • CATARACT EXTRACTION      1970, 1999   • ENDOSCOPY  08/15/2014    no gross lesions in stomach/duodenum, erythrematous mucosa in stomach   • HYSTERECTOMY  2007   • STERNOTOMY         Current Outpatient Medications on File Prior to Visit   Medication Sig Dispense Refill   • amLODIPine (NORVASC) 10 MG tablet Take 1 tablet by mouth daily.     • aspirin 81 MG tablet Take by mouth daily.     • fenofibrate (TRICOR) 145 MG tablet Take 1 tablet by mouth daily.     • Ferrous Sulfate (IRON) 325 (65 FE) MG tablet Take 1 tablet by mouth Daily.     • gabapentin (NEURONTIN) 600 MG tablet Take 1 tablet by mouth 2 (two) times a day.     • glyBURIDE-metFORMIN (GLUCOVANCE) 5-500 MG per tablet Take 2 tablets by mouth 2 (two) times a day.     • insulin glargine (LANTUS) 100 UNIT/ML injection Inject 12 Units under the skin into the appropriate area as directed Daily.     • isosorbide mononitrate (IMDUR) 60 MG 24 hr tablet Take 120 mg by mouth Daily.     • levothyroxine (SYNTHROID) 25 MCG tablet Take 1 tablet by mouth daily.     • linagliptin (TRADJENTA) 5 MG tablet  tablet Take 5 mg by mouth daily.     • loratadine (CLARITIN) 10 MG tablet Take 1 tablet by mouth daily.     • LORazepam (ATIVAN) 0.5 MG tablet Take 1 tablet by mouth as needed.     • losartan (COZAAR) 100 MG tablet Take one half tablet by mouth twice daily     • metoclopramide (REGLAN) 10 MG tablet Take 1 tablet by mouth 3 (Three) Times a Day Before Meals. 270 tablet 3   • metoprolol tartrate (LOPRESSOR) 50 MG tablet Take 1 tablet by mouth every 12 (twelve) hours.     • montelukast (SINGULAIR) 10 MG tablet Take 1 tablet by mouth daily.     • Multiple Vitamin tablet Take 1 tablet by mouth daily.     • pantoprazole (PROTONIX) 40 MG EC tablet Take 40 mg by mouth Daily.     • rosuvastatin (CRESTOR) 20 MG tablet Take 20 mg by mouth Daily.     • silodosin (Rapaflo) 8 MG capsule capsule Take 4 mg by mouth Daily With Breakfast.     • spironolactone (ALDACTONE) 25 MG tablet Take 0.5 tablets by mouth daily.     • Vitamin E 400 UNITS tablet Take 1 tablet by mouth daily.       No current facility-administered medications on file prior to visit.       Social History     Socioeconomic History   • Marital status:      Spouse name: Not on file   • Number of children: Not on file   • Years of education: Not on file   • Highest education level: Not on file   Tobacco Use   • Smoking status: Former Smoker     Packs/day: 0.50     Quit date: 7/10/1969     Years since quittin.9   • Smokeless tobacco: Never Used   Substance and Sexual Activity   • Alcohol use: No     Comment: caffeine use - coffee 2 cups daily   • Drug use: No           ROS    Procedures    ECG 12 Lead    Date/Time: 2021 1:19 PM  Performed by: Beth Butcher MD  Authorized by: Beth Butcher MD   Comparison: compared with previous ECG   Similar to previous ECG  Rhythm: sinus rhythm  Other findings: poor R wave progression    Clinical impression: non-specific ECG                Objective:      Vitals:    21 1255   BP: 180/72   BP  "Location: Right arm   Patient Position: Sitting   Cuff Size: Adult   Pulse: 61   Weight: 77.1 kg (170 lb)   Height: 144.8 cm (57\")     Body mass index is 36.79 kg/m².    Vitals reviewed.   Constitutional:       General: Not in acute distress.     Appearance: Well-developed. Not diaphoretic.   Eyes:      General: No scleral icterus.     Conjunctiva/sclera: Conjunctivae normal.   HENT:      Head: Normocephalic and atraumatic.   Neck:      Thyroid: No thyromegaly.      Vascular: No carotid bruit or JVD.      Lymphadenopathy: No cervical adenopathy.   Pulmonary:      Effort: Pulmonary effort is normal. No respiratory distress.      Breath sounds: Normal breath sounds. No wheezing. No rhonchi. No rales.   Chest:      Chest wall: Not tender to palpatation.   Cardiovascular:      Normal rate. Regular rhythm.      Murmurs: There is no murmur.      No gallop.   Pulses:     Intact distal pulses.   Edema:     Peripheral edema absent.   Abdominal:      General: Bowel sounds are normal. There is no distension or abdominal bruit.      Palpations: Abdomen is soft. There is no abdominal mass.      Tenderness: There is no abdominal tenderness.   Musculoskeletal:         General: No deformity.      Extremities: No clubbing present.     Cervical back: Neck supple. Skin:     General: Skin is warm and dry. There is no cyanosis.      Coloration: Skin is not pale.      Findings: No rash.   Neurological:      Mental Status: Alert and oriented to person, place, and time.      Cranial Nerves: No cranial nerve deficit.   Psychiatric:         Judgment: Judgment normal.         Lab Review:       Assessment:      Diagnosis Plan   1. S/P AVR     2. Paroxysmal atrial fibrillation (CMS/HCC)     3. Essential hypertension       1. Severe aortic stenosis, status post AVR [employer] Trifecta bovine   pericardial valve, 21 mm on 06/08/2013. Doing well.   2. Normal carotid duplex 03/2013.  3. Small hiatal hernia. Followed by Dr. Freddy Martel.  4. " Diverticular disease. Followed by Dr. Freddy Martel.  5. Diabetes mellitus, type 2. Followed by Dr. Hickman.   6. Grade II diastolic dysfunction.  7. Hypothyroidism.  8. 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  9. Hyperlipidemia. The patient is on Zocor.  10. Family history of cardiovascular disease.  11. No significant CAD on cath 05/2013.   12. Ventricular bigeminy. Stable.   13. Post operative atrial fibrillation, no recurrence, off of warfarin.  14. C. difficile colitis 07/2013.  15. HTN, BP high, will check at home and provide the readings to me.  16.  CKD, stable but possibly made worse at times by uncontrolled hypertension.     Plan:       Advised them to check her blood pressure at home for the next couple weeks and send me the recordings.  Her kidney function is not great but is stable.  I am concerned though that uncontrolled hypertension can make this worse.  I did not make a medication changes today and will plan to have her see Luly in 6 months.

## 2021-10-19 ENCOUNTER — OFFICE VISIT (OUTPATIENT)
Dept: SURGERY | Facility: CLINIC | Age: 86
End: 2021-10-19

## 2021-10-19 VITALS
WEIGHT: 170 LBS | SYSTOLIC BLOOD PRESSURE: 183 MMHG | HEIGHT: 57 IN | RESPIRATION RATE: 16 BRPM | DIASTOLIC BLOOD PRESSURE: 61 MMHG | OXYGEN SATURATION: 95 % | HEART RATE: 61 BPM | BODY MASS INDEX: 36.68 KG/M2

## 2021-10-19 DIAGNOSIS — R91.1 LUNG NODULE: Primary | ICD-10-CM

## 2021-10-19 PROCEDURE — 99202 OFFICE O/P NEW SF 15 MIN: CPT | Performed by: THORACIC SURGERY (CARDIOTHORACIC VASCULAR SURGERY)

## 2021-10-19 NOTE — PROGRESS NOTES
"Chief Complaint  Lung nodule    Subjective          Helena Carmen presents to [facility]AC[facility] for a new patient appointment.  History of Present Illness     Patient is a 89-year-old female [facility] of a left lower lobe lung nodule.  Patient has a multitude of medical problems.  She had an aortic valve replacement in 2013.  She has pulmonary hypertension.  She has mitral regurgitation.  She has chronic kidney disease and type 2 diabetes.  She recently was found to have microscopic hematuria.  She underwent a CT of the abdomen and pelvis to evaluate this microscopic hematuria.  A 4 mm nodule was found in the base of the left lower lobe.  She was referred here for further evaluation of this nodule.    Patient is a former smoker.  She stopped smoking 56 years ago.  She smoked only 4 to 5 cigarettes daily for about 10 years.  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  She has lived in [place] since 1958.  She had a hysterectomy in 2006 for cancer.  She has no other cancer history.    She has no cough or hemoptysis.  She has no hoarseness or change in her voice.  She has no pleuritic pain.  Her activities are severely limited due to arthritic changes in her hips and knees.  She does have sleep apnea.  She reports wheezing on occasion but it is difficult to assess shortness of breath.  At rest she appears comfortable.    Objective   Vital Signs:   BP (!) 183/61 (BP Location: Right arm, Patient Position: Sitting, Cuff Size: Adult)   Pulse 61   Resp 16   Ht 144.8 cm (57\")   Wt 77.1 kg (170 lb)   SpO2 95%   BMI 36.79 kg/m²     Physical Exam     Neck: There is no cervical or supraclavicular adenopathy    Pulmonary: Decreased breath sounds at both bases.  Few crackles and rales scattered throughout the lungs.  No wheezes.    Cardiac: Systolic murmur.  Regular rate and rhythm.  Trace of dependent edema.    Result " Review :   The following data was reviewed by: Ovidio Vazquez III, MD on 10/19/2021:    CT scan of the abdomen and pelvis performed at [facility] urology was independently reviewed.  There are diffuse groundglass opacities in both bases.  There is interlobular septal thickening.  Trace of a right pleural effusion.  There is a 4 mm subpleural nodule at the base of the left lower lobe.  There is a 3 mm calculus in the lower pole of the right kidney.  There is an 8 mm hyperdense right renal cortical lesion.  Remainder of the abdomen and pelvis are unremarkable         Assessment and Plan    Diagnoses and all orders for this visit:    1. Lung nodule (Primary)  -     CT Chest Without Contrast; Future      I spent 28 minutes caring for Helena on this date of service. This time includes time spent by me in the following activities:preparing for the visit, reviewing tests, obtaining and/or reviewing a separately obtained history, performing a medically appropriate examination and/or evaluation , counseling and educating the patient/family/caregiver, ordering medications, tests, or procedures, referring and communicating with other health care professionals  and documenting information in the medical record     This nodule is too small for needle biopsy and too far in the periphery for bronchoscopy.  This would require at least a VAT for biopsy.  It is unlikely that this is a malignancy.  Given her age and medical condition she would not be a candidate for any general anesthetic.  I have recommended to her and her daughter that we just follow this conservatively with serial CT scans.  I have recommended a repeat CT scan of the chest in 6 months.  The patient and the daughter understand this plan and agree.    Patient will return to see me in the office in 6 months with a CT of the chest without contrast.  I will keep you informed of her progress.  Thank you for allowing me to participate in the care of Ms. Carmen.    Follow Up    Return in about 6 months (around 4/19/2022) for Recheck.  Patient was given instructions and counseling regarding her condition or for health maintenance advice. Please see specific information pulled into the AVS if appropriate.

## 2021-12-17 ENCOUNTER — HOSPITAL ENCOUNTER (OUTPATIENT)
Dept: CARDIOLOGY | Facility: HOSPITAL | Age: 86
Discharge: HOME | End: 2021-12-17
Admitting: NURSE PRACTITIONER

## 2021-12-17 ENCOUNTER — OFFICE VISIT (OUTPATIENT)
Dept: CARDIOLOGY | Facility: CLINIC | Age: 86
End: 2021-12-17

## 2021-12-17 VITALS
WEIGHT: 171 LBS | BODY MASS INDEX: 36.89 KG/M2 | HEART RATE: 53 BPM | SYSTOLIC BLOOD PRESSURE: 180 MMHG | DIASTOLIC BLOOD PRESSURE: 62 MMHG | HEIGHT: 57 IN

## 2021-12-17 DIAGNOSIS — I36.1 NONRHEUMATIC TRICUSPID VALVE REGURGITATION: ICD-10-CM

## 2021-12-17 DIAGNOSIS — I34.0 NONRHEUMATIC MITRAL VALVE REGURGITATION: ICD-10-CM

## 2021-12-17 DIAGNOSIS — R06.09 DYSPNEA ON EXERTION: ICD-10-CM

## 2021-12-17 DIAGNOSIS — I48.0 PAROXYSMAL ATRIAL FIBRILLATION (HCC): ICD-10-CM

## 2021-12-17 DIAGNOSIS — I27.20 PULMONARY HYPERTENSION (HCC): ICD-10-CM

## 2021-12-17 DIAGNOSIS — R06.09 DYSPNEA ON EXERTION: Primary | ICD-10-CM

## 2021-12-17 DIAGNOSIS — I27.20 HYPERTENSIVE PULMONARY VASCULAR DISEASE (HCC): Primary | ICD-10-CM

## 2021-12-17 DIAGNOSIS — I51.89 DIASTOLIC DYSFUNCTION: ICD-10-CM

## 2021-12-17 DIAGNOSIS — Z95.2 S/P AVR: ICD-10-CM

## 2021-12-17 DIAGNOSIS — I10 ESSENTIAL HYPERTENSION: ICD-10-CM

## 2021-12-17 DIAGNOSIS — I35.0 NONRHEUMATIC AORTIC VALVE STENOSIS: ICD-10-CM

## 2021-12-17 DIAGNOSIS — I38 VALVULAR HEART DISEASE: ICD-10-CM

## 2021-12-17 LAB
ALBUMIN SERPL-MCNC: 3.8 G/DL (ref 3.5–5.2)
ALBUMIN/GLOB SERPL: 1.3 G/DL
ALP SERPL-CCNC: 33 U/L (ref 39–117)
ALT SERPL W P-5'-P-CCNC: 16 U/L (ref 1–33)
ANION GAP SERPL CALCULATED.3IONS-SCNC: 10.6 MMOL/L (ref 5–15)
AST SERPL-CCNC: 27 U/L (ref 1–32)
BILIRUB SERPL-MCNC: 0.4 MG/DL (ref 0–1.2)
BUN SERPL-MCNC: 30 MG/DL (ref 8–23)
BUN/CREAT SERPL: 19.4 (ref 7–25)
CALCIUM SPEC-SCNC: 9.4 MG/DL (ref 8.2–9.6)
CHLORIDE SERPL-SCNC: 107 MMOL/L (ref 98–107)
CO2 SERPL-SCNC: 18.4 MMOL/L (ref 22–29)
CREAT SERPL-MCNC: 1.55 MG/DL (ref 0.57–1)
GFR SERPL CREATININE-BSD FRML MDRD: 31 ML/MIN/1.73
GLOBULIN UR ELPH-MCNC: 3 GM/DL
GLUCOSE SERPL-MCNC: 90 MG/DL (ref 65–99)
NT-PROBNP SERPL-MCNC: 4366 PG/ML (ref 0–1800)
POTASSIUM SERPL-SCNC: 4.4 MMOL/L (ref 3.5–5.2)
PROT SERPL-MCNC: 6.8 G/DL (ref 6–8.5)
SODIUM SERPL-SCNC: 136 MMOL/L (ref 136–145)
T-UPTAKE NFR SERPL: 1.12 TBI (ref 0.8–1.3)
T4 SERPL-MCNC: 8.37 MCG/DL (ref 4.5–11.7)
TSH SERPL DL<=0.05 MIU/L-ACNC: 3.11 UIU/ML (ref 0.27–4.2)

## 2021-12-17 PROCEDURE — 84443 ASSAY THYROID STIM HORMONE: CPT

## 2021-12-17 PROCEDURE — 84436 ASSAY OF TOTAL THYROXINE: CPT

## 2021-12-17 PROCEDURE — 93000 ELECTROCARDIOGRAM COMPLETE: CPT | Performed by: NURSE PRACTITIONER

## 2021-12-17 PROCEDURE — 80053 COMPREHEN METABOLIC PANEL: CPT | Performed by: NURSE PRACTITIONER

## 2021-12-17 PROCEDURE — 84479 ASSAY OF THYROID (T3 OR T4): CPT

## 2021-12-17 PROCEDURE — 99214 OFFICE O/P EST MOD 30 MIN: CPT | Performed by: NURSE PRACTITIONER

## 2021-12-17 PROCEDURE — 83880 ASSAY OF NATRIURETIC PEPTIDE: CPT | Performed by: NURSE PRACTITIONER

## 2021-12-17 PROCEDURE — 36415 COLL VENOUS BLD VENIPUNCTURE: CPT

## 2021-12-17 NOTE — PROGRESS NOTES
Date of Office Visit: 2021  Encounter Provider: RIGO Giraldo  Place of Service: [facility] CARDIOLOGY  Patient Name: Helena Carmen  :1931  Primary Cardiologist: Dr. Beth Butcher     Chief Complaint   Patient presents with   • Atrial Fibrillation   • s/p  AVR   • Follow-up   :     HPI: Helena Carmen is a pleasant 89 y.o. female who presents today for follow-up on valvular heart disease.  I have reviewed her medical records.    She has been diagnosed with type 2 diabetes mellitus, hypothyroidism, hypertension, hyperlipidemia, and chronic kidney disease followed by Dr. Art Wick.       In 2013, she underwent to aortic valve replacement for severe aortic stenosis.  Postoperatively she developed some atrial fibrillation and PVCs.  She was initially treated with warfarin which was discontinued because she did not have any recurrent atrial fibrillation.     In 2019, echocardiogram showed the following: LVEF 56%, grade 2 diastolic dysfunction, mild LVH, left atrium moderately to severely dilated, moderate mitral valve insufficiency, moderate tricuspid regurgitation, RVSP elevated at 69 mmHg, and normal functioning prosthetic aortic valve. She was treated with furosemide and this was discontinued because of her chronic kidney disease.    In 2021, she followed up with Dr. Butcher.  Her blood pressure was 180/72 and and no changes were made to her medication regimen.  She was recommended that she monitor her blood pressure at home to call with an update in a few weeks.  She never did so.    She presents today for follow-up visit and her daughter is accompanying her.  She reports new onset dyspnea with exertion over the past 2 months.  She denies coughing, but occasionally has wheezes.  She has had 2 episodes of palpitations in the past 6 months.  She denies chest pain, PND, orthopnea, edema, dizziness, syncope, or bleeding.  Her blood pressure is  quite elevated today and has been so intermittently since .  They do not check her blood pressure at home.      Past Medical History:   Diagnosis Date   • Aortic valve stenosis     s/p tissue AVR   • Back pain    • Diastolic dysfunction     Grade 2 per echocardiogram    • Diverticulosis    • Exertional shortness of breath    • Hiatal hernia    • Hyperlipidemia    • Hypertension    • Hyperthyroidism    • Hypertriglyceridemia 2018   • Hypothyroidism    • Left ventricular hypertrophy    • Legally blind    • Macular degeneration    • Mitral regurgitation    • Osteoarthritis of hip    • Paroxysmal atrial fibrillation (HCC)    • Premature ventricular contractions    • Pulmonary hypertension (HCC)    • Renal insufficiency syndrome    • Type 2 diabetes mellitus (HCC)        Past Surgical History:   Procedure Laterality Date   • AORTIC VALVE REPAIR/REPLACEMENT     • CATARACT EXTRACTION      ,    • ENDOSCOPY  08/15/2014    no gross lesions in stomach/duodenum, erythrematous mucosa in stomach   • HYSTERECTOMY     • STERNOTOMY         Social History     Socioeconomic History   • Marital status:    Tobacco Use   • Smoking status: Former Smoker     Packs/day: 0.50     Quit date: 7/10/1969     Years since quittin.4   • Smokeless tobacco: Never Used   Substance and Sexual Activity   • Alcohol use: No     Comment: caffeine use - coffee 2 cups daily   • Drug use: No       Family History   Problem Relation Age of Onset   • Heart disease Mother    • Hypertension Mother    • Stroke Mother    • Diabetes Mother         mellitus   • Other Other         cardiovascular disorder       The following portion of the patient's history were reviewed and updated as appropriate: past medical history, past surgical history, past social history, past family history, allergies, current medications, and problem list.    Review of Systems   Constitutional: Negative.   Cardiovascular: Positive for dyspnea on exertion.    Respiratory: Positive for shortness of breath and wheezing.    Hematologic/Lymphatic: Negative.    Musculoskeletal: Positive for joint pain and myalgias.   Gastrointestinal: Positive for diarrhea and nausea.   Neurological: Positive for excessive daytime sleepiness.   Psychiatric/Behavioral: The patient is nervous/anxious.        Allergies   Allergen Reactions   • Erythromycin Unknown (See Comments)     Pt states she does not remember but it was many years ago   • Keflex [Cephalexin] Unknown (See Comments)     Pt states she does not remember reaction but it was many years ago    • Penicillins Rash   • Sulfa Antibiotics Itching and Rash         Current Outpatient Medications:   •  amLODIPine (NORVASC) 10 MG tablet, Take 1 tablet by mouth daily., Disp: , Rfl:   •  aspirin 81 MG tablet, Take by mouth daily., Disp: , Rfl:   •  fenofibrate (TRICOR) 145 MG tablet, Take 1 tablet by mouth daily., Disp: , Rfl:   •  Ferrous Sulfate (IRON) 325 (65 FE) MG tablet, Take 1 tablet by mouth Daily., Disp: , Rfl:   •  gabapentin (NEURONTIN) 600 MG tablet, Take 1 tablet by mouth 2 (two) times a day., Disp: , Rfl:   •  glyBURIDE-metFORMIN (GLUCOVANCE) 5-500 MG per tablet, Take 2 tablets by mouth 2 (two) times a day., Disp: , Rfl:   •  insulin glargine (LANTUS) 100 UNIT/ML injection, Inject 12 Units under the skin into the appropriate area as directed Daily., Disp: , Rfl:   •  isosorbide mononitrate (IMDUR) 120 MG 24 hr tablet, Take 120 mg by mouth Daily., Disp: , Rfl:   •  levothyroxine (SYNTHROID) 25 MCG tablet, Take 1 tablet by mouth daily., Disp: , Rfl:   •  linagliptin (TRADJENTA) 5 MG tablet tablet, Take 5 mg by mouth daily., Disp: , Rfl:   •  loratadine (CLARITIN) 10 MG tablet, Take 1 tablet by mouth daily., Disp: , Rfl:   •  LORazepam (ATIVAN) 0.5 MG tablet, Take 1 tablet by mouth as needed., Disp: , Rfl:   •  losartan (COZAAR) 100 MG tablet, Take one half tablet by mouth twice daily, Disp: , Rfl:   •  metoclopramide (REGLAN)  "10 MG tablet, Take 1 tablet by mouth 3 (Three) Times a Day Before Meals., Disp: 270 tablet, Rfl: 3  •  metoprolol tartrate (LOPRESSOR) 50 MG tablet, Take 1 tablet by mouth every 12 (twelve) hours., Disp: , Rfl:   •  montelukast (SINGULAIR) 10 MG tablet, Take 1 tablet by mouth daily., Disp: , Rfl:   •  Multiple Vitamin tablet, Take 1 tablet by mouth daily., Disp: , Rfl:   •  pantoprazole (PROTONIX) 40 MG EC tablet, Take 40 mg by mouth Daily., Disp: , Rfl:   •  rosuvastatin (CRESTOR) 20 MG tablet, Take 20 mg by mouth Daily., Disp: , Rfl:   •  silodosin (Rapaflo) 4 MG capsule capsule, Take 4 mg by mouth Daily With Breakfast., Disp: , Rfl:   •  spironolactone (ALDACTONE) 25 MG tablet, Take 0.5 tablets by mouth daily., Disp: , Rfl:   •  Unable to find, 1 each 1 (One) Time. Ranulfo XL joint pain supplement, Disp: , Rfl:   •  Vitamin E 400 UNITS tablet, Take 1 tablet by mouth daily., Disp: , Rfl:         Objective:     Vitals:    12/17/21 1117 12/17/21 1147   BP: 178/66 180/62   BP Location: Left arm Right arm   Pulse: 53    Weight: 77.6 kg (171 lb)    Height: 144.8 cm (57\")      Body mass index is 37 kg/m².    PHYSICAL EXAM:    Vitals Reviewed.   General Appearance: No acute distress, well developed and well nourished. Obese.  In a wheelchair.  Eyes: Conjunctiva and lids: No erythema, swelling, or discharge. Sclera non-icteric. Glasses.   HENT: Atraumatic, normocephalic. External eyes, ears, and nose normal. No hearing loss noted. Mucous membranes normal. Lips not cyanotic. Neck supple with no tenderness. Wearing mask.   Respiratory: No signs of respiratory distress. Respiration rhythm and depth normal.   Clear to auscultation. No rales, crackles, rhonchi, or wheezing auscultated.   Cardiovascular:  Jugular Venous Pressure: Normal  Heart Rate and Rhythm: Normal, Heart Sounds: Normal S1 and S2. No S3 or S4 noted.  Murmurs: LUSB grade 2/6 murmur present.  No rubs, thrills, or gallops.   Lower Extremities: No edema " noted.  Gastrointestinal:  Abdomen soft, non-distended, non-tender.    Musculoskeletal: Normal movement of extremities.  Skin and Nails: General appearance normal. No pallor, cyanosis, diaphoresis. Skin temperature normal. No clubbing of fingernails.   Psychiatric: Patient alert and oriented to person, place, and time. Speech and behavior appropriate. Normal mood and affect.       ECG 12 Lead    Date/Time: 12/17/2021 11:34 AM  Performed by: Luly Pina APRN  Authorized by: Luly Pina APRN   Comparison: compared with previous ECG from 6/16/2021  Similar to previous ECG  Rhythm: sinus bradycardia  Rate: normal  BPM: 53  Conduction: non-specific intraventricular conduction delay  ST Segments: ST segments normal  T Waves: T waves normal  QRS axis: normal    Clinical impression: abnormal EKG              Assessment:       Diagnosis Plan   1. Dyspnea on exertion  Adult Transthoracic Echo Complete W/ Cont if Necessary Per Protocol    Comprehensive Metabolic Panel    proBNP   2. Nonrheumatic aortic valve stenosis  Adult Transthoracic Echo Complete W/ Cont if Necessary Per Protocol   3. S/P AVR  Adult Transthoracic Echo Complete W/ Cont if Necessary Per Protocol   4. Diastolic dysfunction  Adult Transthoracic Echo Complete W/ Cont if Necessary Per Protocol   5. Pulmonary hypertension (HCC)     6. Nonrheumatic mitral valve regurgitation     7. Nonrheumatic tricuspid valve regurgitation     8. Valvular heart disease     9. Essential hypertension     10. Paroxysmal atrial fibrillation (HCC)            Plan:       1.  Dyspnea: She reports new dyspnea on exertion with minimal exertion.  I recommended a CBC, CMP, TSH, and proBNP to be completed.  She will be scheduled for an outpatient echocardiogram.    2/3.  Aortic Stenosis: Status post aortic valve replacement in June 2013.  Stable aortic valve per echocardiogram in 2019.  Heart murmur present today.  Repeat echocardiogram.    4.  Diastolic Dysfunction: Grade 2 per  echocardiogram in 2019.    5.  Pulmonary Hypertension: Severe per echocardiogram in 2019.    6/7/8.  Valvular Heart Disease: Moderate tricuspid and mitral regurgitation.    9.  Hypertension: Blood pressure elevated today and has been since at least June 2021.  She has known chronic kidney disease as well.  I will further discuss with Dr. Beth Butcher.  I will also need to clarify her isosorbide dosage-if she taking 60 mg or 120 mg daily?    10.  Paroxysmal Atrial Fibrillation: Occurred postoperatively in 2013.  She is reported to brief episodes of palpitations.  She has no longer on warfarin.    11.  Further recommendations to follow after blood work and echocardiogram is completed.    ADDENDUM 12/21/2021:    · CMP showed a BUN of 30 and creatinine 1.55.  proBNP elevated at 4366.  Thyroid panel normal.  · I discussed plan of care with Dr. Butcher. She recommended increasing spironolactone 25 mg 1 tablet daily and furosemide 40 mg daily. If this does not regulate her blood pressure then Dr. Butcher recommended adding hydralazine 50 mg twice daily.   · I just spoke with patient via phone. She is visually impaired and cannot write down the information today. She has asked that I call her daughter. She does not know if she is taking isosorbide 60 mg or 120 mg daily.   · I called her daughter, Radha and she took the instructions.  Increase spironolactone to 25 mg daily.  Add furosemide 40 mg daily.  Repeat CBC, BMP, and proBNP next week.  Follow-up in the office for echocardiogram and appointment on 1/6/2022.      As always, it has been a pleasure to participate in your patient's care. Thank you.       Sincerely,         RIGO Pedro  [facility]      · Dictated utilizing Dragon Dictation  · COVID-19 Precautions - Patient was compliant in wearing a mask. When I saw the patient, I used appropriate personal protective equipment (PPE) including mask and eye shield (standard  procedure).  Additionally, I used gown and gloves if indicated.  Hand hygiene was completed before and after seeing the patient.  · I spent 32 minutes reviewing her medical records/testing/previous office notes/labs, face-to-face interaction with patient, physical examination, formulating the plan of care, and discussion of plan of care with patient.

## 2021-12-21 ENCOUNTER — TELEPHONE (OUTPATIENT)
Dept: CARDIOLOGY | Facility: CLINIC | Age: 86
End: 2021-12-21

## 2021-12-21 DIAGNOSIS — R06.09 DYSPNEA ON EXERTION: ICD-10-CM

## 2021-12-21 DIAGNOSIS — I34.0 NONRHEUMATIC MITRAL VALVE REGURGITATION: Primary | ICD-10-CM

## 2021-12-21 DIAGNOSIS — I51.89 DIASTOLIC DYSFUNCTION: ICD-10-CM

## 2021-12-21 RX ORDER — FUROSEMIDE 40 MG/1
40 TABLET ORAL DAILY
Qty: 90 TABLET | Refills: 0 | Status: SHIPPED | OUTPATIENT
Start: 2021-12-21 | End: 2022-01-26 | Stop reason: SDUPTHER

## 2021-12-21 RX ORDER — SPIRONOLACTONE 25 MG/1
25 TABLET ORAL DAILY
Qty: 90 TABLET | Refills: 0 | Status: SHIPPED | OUTPATIENT
Start: 2021-12-21 | End: 2022-01-26 | Stop reason: SDUPTHER

## 2021-12-21 NOTE — TELEPHONE ENCOUNTER
Scheduling-she is currently scheduled to have an echocardiogram on 1/6.  Please see if she can have an appointment with me before or after that echocardiogram.  If not, please reschedule both the echo and appointment with me on the same day in early January.  Please call her daughter Radha to schedule the appointments.    Thank you.

## 2021-12-21 NOTE — TELEPHONE ENCOUNTER
Patient was recently seen in the office.  Blood pressure was elevated and she was dyspneic.  CMP showed a BUN of 30 and creatinine 1.55.  proBNP elevated at 4366.  Thyroid panel normal.    I discussed plan of care with Dr. Butcher. She recommended increasing spironolactone 25 mg 1 tablet daily and furosemide 40 mg daily. If this does not regulate her blood pressure then Dr. Butcher recommended adding hydralazine 50 mg twice daily.     I just spoke with patient via phone. She is visually impaired and cannot write down the information today. She has asked that I call her daughter. She does not know if she is taking isosorbide 60 mg or 120 mg daily.     I called her daughter, Radha and she took the instructions.  Increase spironolactone to 25 mg daily.  Add furosemide 40 mg daily.  Repeat CBC, BMP, and proBNP next week.  Follow-up in the office for echocardiogram and appointment on 1/6/2022.

## 2022-01-01 ENCOUNTER — TELEPHONE (OUTPATIENT)
Dept: ONCOLOGY | Facility: CLINIC | Age: 87
End: 2022-01-01

## 2022-01-26 ENCOUNTER — OFFICE VISIT (OUTPATIENT)
Dept: CARDIOLOGY | Facility: CLINIC | Age: 87
End: 2022-01-26

## 2022-01-26 ENCOUNTER — HOSPITAL ENCOUNTER (OUTPATIENT)
Dept: CARDIOLOGY | Facility: HOSPITAL | Age: 87
Discharge: HOME | End: 2022-01-26

## 2022-01-26 VITALS — HEART RATE: 62 BPM | WEIGHT: 171 LBS | BODY MASS INDEX: 36.89 KG/M2 | HEIGHT: 57 IN

## 2022-01-26 VITALS
HEART RATE: 60 BPM | BODY MASS INDEX: 34.73 KG/M2 | DIASTOLIC BLOOD PRESSURE: 50 MMHG | WEIGHT: 161 LBS | HEIGHT: 57 IN | SYSTOLIC BLOOD PRESSURE: 154 MMHG

## 2022-01-26 DIAGNOSIS — R06.09 DYSPNEA ON EXERTION: ICD-10-CM

## 2022-01-26 DIAGNOSIS — I51.89 DIASTOLIC DYSFUNCTION: ICD-10-CM

## 2022-01-26 DIAGNOSIS — I10 ESSENTIAL HYPERTENSION: ICD-10-CM

## 2022-01-26 DIAGNOSIS — I36.1 NONRHEUMATIC TRICUSPID VALVE REGURGITATION: ICD-10-CM

## 2022-01-26 DIAGNOSIS — I27.20 PULMONARY HYPERTENSION: ICD-10-CM

## 2022-01-26 DIAGNOSIS — Z95.2 S/P AVR: ICD-10-CM

## 2022-01-26 DIAGNOSIS — Z95.2 HISTORY OF AORTIC VALVE REPLACEMENT: ICD-10-CM

## 2022-01-26 DIAGNOSIS — I48.0 PAROXYSMAL ATRIAL FIBRILLATION: ICD-10-CM

## 2022-01-26 DIAGNOSIS — I35.0 NONRHEUMATIC AORTIC VALVE STENOSIS: ICD-10-CM

## 2022-01-26 DIAGNOSIS — R06.02 SHORTNESS OF BREATH: Primary | ICD-10-CM

## 2022-01-26 DIAGNOSIS — I34.0 NONRHEUMATIC MITRAL VALVE REGURGITATION: ICD-10-CM

## 2022-01-26 PROBLEM — K85.90 PANCREATITIS: Status: RESOLVED | Noted: 2022-01-26 | Resolved: 2022-01-26

## 2022-01-26 PROBLEM — A04.72 COLITIS DUE TO CLOSTRIDIOIDES DIFFICILE: Status: ACTIVE | Noted: 2022-01-26

## 2022-01-26 PROBLEM — H54.8 LEGAL BLINDNESS: Status: RESOLVED | Noted: 2018-05-25 | Resolved: 2022-01-26

## 2022-01-26 PROBLEM — E78.00 HYPERCHOLESTEROLEMIA: Status: RESOLVED | Noted: 2022-01-26 | Resolved: 2022-01-26

## 2022-01-26 PROBLEM — G89.4 CHRONIC PAIN DISORDER: Status: ACTIVE | Noted: 2022-01-26

## 2022-01-26 PROBLEM — K44.9 HIATAL HERNIA: Status: ACTIVE | Noted: 2022-01-26

## 2022-01-26 PROBLEM — E03.9 HYPOTHYROIDISM: Status: ACTIVE | Noted: 2022-01-26

## 2022-01-26 PROBLEM — C55 MALIGNANT NEOPLASM OF UTERUS: Status: ACTIVE | Noted: 2022-01-26

## 2022-01-26 PROBLEM — K31.84 GASTROPARESIS: Status: ACTIVE | Noted: 2022-01-26

## 2022-01-26 PROBLEM — A04.72 COLITIS DUE TO CLOSTRIDIOIDES DIFFICILE: Status: RESOLVED | Noted: 2022-01-26 | Resolved: 2022-01-26

## 2022-01-26 PROBLEM — E11.9 DIABETES MELLITUS: Status: ACTIVE | Noted: 2022-01-26

## 2022-01-26 PROBLEM — H54.8 LEGAL BLINDNESS: Status: ACTIVE | Noted: 2018-05-25

## 2022-01-26 PROBLEM — R10.31 ABDOMINAL PAIN, RIGHT LOWER QUADRANT: Status: ACTIVE | Noted: 2020-12-13

## 2022-01-26 PROBLEM — H35.30 MACULAR DEGENERATION: Status: ACTIVE | Noted: 2022-01-26

## 2022-01-26 PROBLEM — E03.9 HYPOTHYROIDISM: Status: RESOLVED | Noted: 2022-01-26 | Resolved: 2022-01-26

## 2022-01-26 PROBLEM — K85.90 PANCREATITIS: Status: ACTIVE | Noted: 2022-01-26

## 2022-01-26 PROBLEM — E78.00 HYPERCHOLESTEROLEMIA: Status: ACTIVE | Noted: 2022-01-26

## 2022-01-26 PROBLEM — Z79.891 LONG TERM (CURRENT) USE OF OPIATE ANALGESIC: Status: ACTIVE | Noted: 2022-01-26

## 2022-01-26 LAB
ANION GAP SERPL CALCULATED.3IONS-SCNC: 15.1 MMOL/L (ref 5–15)
BUN SERPL-MCNC: 64 MG/DL (ref 8–23)
BUN/CREAT SERPL: 30 (ref 7–25)
CALCIUM SPEC-SCNC: 9.7 MG/DL (ref 8.2–9.6)
CHLORIDE SERPL-SCNC: 101 MMOL/L (ref 98–107)
CO2 SERPL-SCNC: 19.9 MMOL/L (ref 22–29)
CREAT SERPL-MCNC: 2.13 MG/DL (ref 0.57–1)
DEPRECATED RDW RBC AUTO: 46.1 FL (ref 37–54)
ERYTHROCYTE [DISTWIDTH] IN BLOOD BY AUTOMATED COUNT: 13.5 % (ref 12.3–15.4)
GFR SERPL CREATININE-BSD FRML MDRD: 22 ML/MIN/1.73
GLUCOSE SERPL-MCNC: 212 MG/DL (ref 65–99)
HCT VFR BLD AUTO: 40.5 % (ref 34–46.6)
HGB BLD-MCNC: 12.9 G/DL (ref 12–15.9)
MCH RBC QN AUTO: 29.3 PG (ref 26.6–33)
MCHC RBC AUTO-ENTMCNC: 31.9 G/DL (ref 31.5–35.7)
MCV RBC AUTO: 92 FL (ref 79–97)
NT-PROBNP SERPL-MCNC: 1517 PG/ML (ref 0–1800)
PLATELET # BLD AUTO: 60 10*3/MM3 (ref 140–450)
PMV BLD AUTO: 14 FL (ref 6–12)
POTASSIUM SERPL-SCNC: 4.2 MMOL/L (ref 3.5–5.2)
RBC # BLD AUTO: 4.4 10*6/MM3 (ref 3.77–5.28)
SODIUM SERPL-SCNC: 136 MMOL/L (ref 136–145)
WBC NRBC COR # BLD: 5.86 10*3/MM3 (ref 3.4–10.8)

## 2022-01-26 PROCEDURE — 99214 OFFICE O/P EST MOD 30 MIN: CPT | Performed by: NURSE PRACTITIONER

## 2022-01-26 PROCEDURE — 93000 ELECTROCARDIOGRAM COMPLETE: CPT | Performed by: NURSE PRACTITIONER

## 2022-01-26 PROCEDURE — 93306 TTE W/DOPPLER COMPLETE: CPT

## 2022-01-26 PROCEDURE — 83880 ASSAY OF NATRIURETIC PEPTIDE: CPT | Performed by: NURSE PRACTITIONER

## 2022-01-26 PROCEDURE — 36415 COLL VENOUS BLD VENIPUNCTURE: CPT

## 2022-01-26 PROCEDURE — 85027 COMPLETE CBC AUTOMATED: CPT | Performed by: NURSE PRACTITIONER

## 2022-01-26 PROCEDURE — 80048 BASIC METABOLIC PNL TOTAL CA: CPT | Performed by: NURSE PRACTITIONER

## 2022-01-26 PROCEDURE — 93306 TTE W/DOPPLER COMPLETE: CPT | Performed by: INTERNAL MEDICINE

## 2022-01-26 RX ORDER — HYDRALAZINE HYDROCHLORIDE 50 MG/1
50 TABLET, FILM COATED ORAL 2 TIMES DAILY
Qty: 180 TABLET | Refills: 3 | Status: SHIPPED | OUTPATIENT
Start: 2022-01-26 | End: 2022-03-02 | Stop reason: SDUPTHER

## 2022-01-26 RX ORDER — FUROSEMIDE 40 MG/1
40 TABLET ORAL DAILY
Qty: 90 TABLET | Refills: 3 | Status: SHIPPED | OUTPATIENT
Start: 2022-01-26 | End: 2022-01-27

## 2022-01-26 RX ORDER — SPIRONOLACTONE 25 MG/1
25 TABLET ORAL DAILY
Qty: 90 TABLET | Refills: 3 | Status: SHIPPED | OUTPATIENT
Start: 2022-01-26 | End: 2022-02-18

## 2022-01-26 NOTE — PATIENT INSTRUCTIONS
"  Hydralazine 50 mg 1 tablet twice daily   Check blood pressure at home and call with an update [phone] (ask Luly)      Hypertension, Adult  High blood pressure (hypertension) is when the force of blood pumping through the arteries is too strong. The arteries are the blood vessels that carry blood from the heart throughout the body. Hypertension forces the heart to work harder to pump blood and may cause arteries to become narrow or stiff. Untreated or uncontrolled hypertension can cause a heart attack, heart failure, a stroke, kidney disease, and other problems.  A blood pressure reading consists of a higher number over a lower number. Ideally, your blood pressure should be below 120/80. The first (\"top\") number is called the systolic pressure. It is a measure of the pressure in your arteries as your heart beats. The second (\"bottom\") number is called the diastolic pressure. It is a measure of the pressure in your arteries as the heart relaxes.  What are the causes?  The exact cause of this condition is not known. There are some conditions that result in or are related to high blood pressure.  What increases the risk?  Some risk factors for high blood pressure are under your control. The following factors may make you more likely to develop this condition:  · Smoking.  · Having type 2 diabetes mellitus, high cholesterol, or both.  · Not getting enough exercise or physical activity.  · Being overweight.  · Having too much fat, sugar, calories, or salt (sodium) in your diet.  · Drinking too much alcohol.  Some risk factors for high blood pressure may be difficult or impossible to change. Some of these factors include:  · Having chronic kidney disease.  · Having a family history of high blood pressure.  · Age. Risk increases with age.  · Race. You may be at higher risk if you are .  · Gender. Men are at higher risk than women before age 45. After age 65, women are at higher risk than men.  · Having " obstructive sleep apnea.  · Stress.  What are the signs or symptoms?  High blood pressure may not cause symptoms. Very high blood pressure (hypertensive crisis) may cause:  · Headache.  · Anxiety.  · Shortness of breath.  · Nosebleed.  · Nausea and vomiting.  · Vision changes.  · Severe chest pain.  · Seizures.  How is this diagnosed?  This condition is diagnosed by measuring your blood pressure while you are seated, with your arm resting on a flat surface, your legs uncrossed, and your feet flat on the floor. The cuff of the blood pressure monitor will be placed directly against the skin of your upper arm at the level of your heart. It should be measured at least twice using the same arm. Certain conditions can cause a difference in blood pressure between your right and left arms.  Certain factors can cause blood pressure readings to be lower or higher than normal for a short period of time:  · When your blood pressure is higher when you are in a health care provider's office than when you are at home, this is called white coat hypertension. Most people with this condition do not need medicines.  · When your blood pressure is higher at home than when you are in a health care provider's office, this is called masked hypertension. Most people with this condition may need medicines to control blood pressure.  If you have a high blood pressure reading during one visit or you have normal blood pressure with other risk factors, you may be asked to:  · Return on a different day to have your blood pressure checked again.  · Monitor your blood pressure at home for 1 week or longer.  If you are diagnosed with hypertension, you may have other blood or imaging tests to help your health care provider understand your overall risk for other conditions.  How is this treated?  This condition is treated by making healthy lifestyle changes, such as eating healthy foods, exercising more, and reducing your alcohol intake. Your health  care provider may prescribe medicine if lifestyle changes are not enough to get your blood pressure under control, and if:  · Your systolic blood pressure is above 130.  · Your diastolic blood pressure is above 80.  Your personal target blood pressure may vary depending on your medical conditions, your age, and other factors.  Follow these instructions at home:  Eating and drinking    · Eat a diet that is high in fiber and potassium, and low in sodium, added sugar, and fat. An example eating plan is called the DASH (Dietary Approaches to Stop Hypertension) diet. To eat this way:  ? Eat plenty of fresh fruits and vegetables. Try to fill one half of your plate at each meal with fruits and vegetables.  ? Eat whole grains, such as whole-wheat pasta, brown rice, or whole-grain bread. Fill about one fourth of your plate with whole grains.  ? Eat or drink low-fat dairy products, such as skim milk or low-fat yogurt.  ? Avoid fatty cuts of meat, processed or cured meats, and poultry with skin. Fill about one fourth of your plate with lean proteins, such as fish, chicken without skin, beans, eggs, or tofu.  ? Avoid pre-made and processed foods. These tend to be higher in sodium, added sugar, and fat.  · Reduce your daily sodium intake. Most people with hypertension should eat less than 1,500 mg of sodium a day.  · Do not drink alcohol if:  ? Your health care provider tells you not to drink.  ? You are pregnant, may be pregnant, or are planning to become pregnant.  · If you drink alcohol:  ? Limit how much you use to:  § 0-1 drink a day for women.  § 0-2 drinks a day for men.  ? Be aware of how much alcohol is in your drink. In the [place] one drink equals one 12 oz bottle of beer (355 mL), one 5 oz glass of wine (148 mL), or one 1½ oz glass of hard liquor (44 mL).    Lifestyle    · Work with your health care provider to maintain a healthy body weight or to lose weight. Ask what an ideal weight is for you.  · Get at least 30  minutes of exercise most days of the week. Activities may include walking, swimming, or biking.  · Include exercise to strengthen your muscles (resistance exercise), such as Pilates or lifting weights, as part of your weekly exercise routine. Try to do these types of exercises for 30 minutes at least 3 days a week.  · Do not use any products that contain nicotine or tobacco, such as cigarettes, e-cigarettes, and chewing tobacco. If you need help quitting, ask your health care provider.  · Monitor your blood pressure at home as told by your health care provider.  · Keep all follow-up visits as told by your health care provider. This is important.    Medicines  · Take over-the-counter and prescription medicines only as told by your health care provider. Follow directions carefully. Blood pressure medicines must be taken as prescribed.  · Do not skip doses of blood pressure medicine. Doing this puts you at risk for problems and can make the medicine less effective.  · Ask your health care provider about side effects or reactions to medicines that you should watch for.  Contact a health care provider if you:  · Think you are having a reaction to a medicine you are taking.  · Have headaches that keep coming back (recurring).  · Feel dizzy.  · Have swelling in your ankles.  · Have trouble with your vision.  Get help right away if you:  · Develop a severe headache or confusion.  · Have unusual weakness or numbness.  · Feel faint.  · Have severe pain in your chest or abdomen.  · Vomit repeatedly.  · Have trouble breathing.  Summary  · Hypertension is when the force of blood pumping through your arteries is too strong. If this condition is not controlled, it may put you at risk for serious complications.  · Your personal target blood pressure may vary depending on your medical conditions, your age, and other factors. For most people, a normal blood pressure is less than 120/80.  · Hypertension is treated with lifestyle  changes, medicines, or a combination of both. Lifestyle changes include losing weight, eating a healthy, low-sodium diet, exercising more, and limiting alcohol.  This information is not intended to replace advice given to you by your health care provider. Make sure you discuss any questions you have with your health care provider.  Document Revised: 08/28/2019 Document Reviewed: 08/28/2019  [employer] Patient Education © 2021 [employer]

## 2022-01-26 NOTE — PROGRESS NOTES
Date of Office Visit: 2022  Encounter Provider: RIGO Giraldo  Place of Service: [facility]  Patient Name: Helena Carmen  :1931  Primary Cardiologist: Dr. Beth Butcher     Chief Complaint   Patient presents with   • Aortic Stenosis   • Follow-up   :     HPI: Helena Carmen is a pleasant 89 y.o. female who presents today for follow-up on valvular heart disease.  I have reviewed her medical records. She has been diagnosed with type 2 diabetes mellitus, hypothyroidism, hypertension, hyperlipidemia, and chronic kidney disease followed by Dr. Art Wick.       In 2013, she underwent to aortic valve replacement for severe aortic stenosis.  Postoperatively she developed some atrial fibrillation and PVCs.  She was initially treated with warfarin which was discontinued because she did not have any recurrent atrial fibrillation.     In 2019, echocardiogram showed the following: LVEF 56%, grade 2 diastolic dysfunction, mild LVH, left atrium moderately to severely dilated, moderate mitral valve insufficiency, moderate tricuspid regurgitation, RVSP elevated at 69 mmHg, and normal functioning prosthetic aortic valve. She was treated with furosemide and this was discontinued because of her chronic kidney disease.    In 2021, she followed up with Dr. Butcher.  Her blood pressure was 180/72 and and no changes were made to her medication regimen.  She was recommended that she monitor her blood pressure at home to call with an update in a few weeks.  She never did so.    In 2021, she followed up with me in the office.  She reported new onset dyspnea with exertion over 2 months.  Her blood pressure was also elevated in the office.  CMP showed an elevated BUN of 30, creatinine 1.55, proBNP of 4366, and TSH was normal.  I discussed the plan of care with Dr. Butcher's.  I increased her spironolactone to 25 mg daily and frusemide 40 mg daily.   Dr. Butcher said if her blood pressure remained elevated, we could add hydralazine 50 mg twice per day.    She presents today for follow-up visit and her daughter is accompanying her.  She does had an echocardiogram completed and the results are pending.  She is due for repeat blood work today.  Her shortness of breath has resolved with the increase of the diuretics.  She denies chest pain, shortness of air, PND, orthopnea, cough, edema, palpitations, syncope, or bleeding.  She reports some occasional dizziness.  Her blood pressure remains elevated, but has improved.  She is due for blood work today.      Past Medical History:   Diagnosis Date   • Aortic valve stenosis     s/p tissue AVR   • Back pain    • Colitis due to Clostridioides difficile 2022   • Diastolic dysfunction     Grade 2 per echocardiogram    • Diverticulosis    • Exertional shortness of breath    • Hiatal hernia    • Hyperlipidemia    • Hypertension    • Hyperthyroidism    • Hypertriglyceridemia 2018   • Hypothyroidism    • Left ventricular hypertrophy    • Legally blind    • Macular degeneration    • Mitral regurgitation    • Osteoarthritis of hip    • Pancreatitis 2022   • Paroxysmal atrial fibrillation (HCC)    • Premature ventricular contractions    • Pulmonary hypertension (HCC)    • Renal insufficiency syndrome    • Type 2 diabetes mellitus (HCC)        Past Surgical History:   Procedure Laterality Date   • AORTIC VALVE REPAIR/REPLACEMENT     • CATARACT EXTRACTION      ,    • ENDOSCOPY  08/15/2014    no gross lesions in stomach/duodenum, erythrematous mucosa in stomach   • HYSTERECTOMY     • STERNOTOMY         Social History     Socioeconomic History   • Marital status:    Tobacco Use   • Smoking status: Former Smoker     Packs/day: 0.50     Quit date: 7/10/1969     Years since quittin.5   • Smokeless tobacco: Never Used   Substance and Sexual Activity   • Alcohol use: No     Comment: caffeine use  - coffee 2 cups daily   • Drug use: No       Family History   Problem Relation Age of Onset   • Heart disease Mother    • Hypertension Mother    • Stroke Mother    • Diabetes Mother         mellitus   • Other Other         cardiovascular disorder       The following portion of the patient's history were reviewed and updated as appropriate: past medical history, past surgical history, past social history, past family history, allergies, current medications, and problem list.    Review of Systems   Constitutional: Negative.   Cardiovascular: Positive for dyspnea on exertion.   Respiratory: Positive for shortness of breath and wheezing.    Hematologic/Lymphatic: Negative.    Musculoskeletal: Positive for joint pain and myalgias.   Gastrointestinal: Positive for diarrhea and nausea.   Neurological: Positive for excessive daytime sleepiness.   Psychiatric/Behavioral: The patient is nervous/anxious.        Allergies   Allergen Reactions   • Erythromycin Unknown (See Comments)     Pt states she does not remember but it was many years ago   • Keflex [Cephalexin] Unknown (See Comments)     Pt states she does not remember reaction but it was many years ago    • Penicillins Rash   • Sulfa Antibiotics Itching and Rash         Current Outpatient Medications:   •  amLODIPine (NORVASC) 10 MG tablet, Take 1 tablet by mouth daily., Disp: , Rfl:   •  aspirin 81 MG tablet, Take by mouth daily., Disp: , Rfl:   •  fenofibrate (TRICOR) 145 MG tablet, Take 1 tablet by mouth daily., Disp: , Rfl:   •  Ferrous Sulfate (IRON) 325 (65 FE) MG tablet, Take 1 tablet by mouth Daily., Disp: , Rfl:   •  furosemide (LASIX) 40 MG tablet, Take 1 tablet by mouth Daily., Disp: 90 tablet, Rfl: 3  •  gabapentin (NEURONTIN) 600 MG tablet, Take 1 tablet by mouth 2 (two) times a day., Disp: , Rfl:   •  glyBURIDE-metFORMIN (GLUCOVANCE) 5-500 MG per tablet, Take 2 tablets by mouth 2 (two) times a day., Disp: , Rfl:   •  insulin glargine (LANTUS) 100 UNIT/ML  "injection, Inject 12 Units under the skin into the appropriate area as directed Daily., Disp: , Rfl:   •  isosorbide mononitrate (IMDUR) 120 MG 24 hr tablet, Take 120 mg by mouth Daily., Disp: , Rfl:   •  levothyroxine (SYNTHROID) 25 MCG tablet, Take 1 tablet by mouth daily., Disp: , Rfl:   •  linagliptin (TRADJENTA) 5 MG tablet tablet, Take 5 mg by mouth daily., Disp: , Rfl:   •  loratadine (CLARITIN) 10 MG tablet, Take 1 tablet by mouth daily., Disp: , Rfl:   •  LORazepam (ATIVAN) 0.5 MG tablet, Take 1 tablet by mouth as needed., Disp: , Rfl:   •  losartan (COZAAR) 100 MG tablet, Take one half tablet by mouth twice daily, Disp: , Rfl:   •  metoclopramide (REGLAN) 10 MG tablet, Take 1 tablet by mouth 3 (Three) Times a Day Before Meals., Disp: 270 tablet, Rfl: 3  •  metoprolol tartrate (LOPRESSOR) 50 MG tablet, Take 1 tablet by mouth every 12 (twelve) hours., Disp: , Rfl:   •  montelukast (SINGULAIR) 10 MG tablet, Take 1 tablet by mouth daily., Disp: , Rfl:   •  Multiple Vitamin tablet, Take 1 tablet by mouth daily., Disp: , Rfl:   •  pantoprazole (PROTONIX) 40 MG EC tablet, Take 40 mg by mouth Daily., Disp: , Rfl:   •  rosuvastatin (CRESTOR) 20 MG tablet, Take 20 mg by mouth Daily., Disp: , Rfl:   •  silodosin (Rapaflo) 4 MG capsule capsule, Take 4 mg by mouth Daily With Breakfast., Disp: , Rfl:   •  spironolactone (ALDACTONE) 25 MG tablet, Take 1 tablet by mouth Daily., Disp: 90 tablet, Rfl: 3  •  Unable to find, 1 each 1 (One) Time. Ranulfo XL joint pain supplement, Disp: , Rfl:   •  Vitamin E 400 UNITS tablet, Take 1 tablet by mouth daily., Disp: , Rfl:   •  hydrALAZINE (APRESOLINE) 50 MG tablet, Take 1 tablet by mouth 2 (Two) Times a Day., Disp: 180 tablet, Rfl: 3        Objective:     Vitals:    01/26/22 1230 01/26/22 1255   BP: 148/50 154/50   BP Location: Left arm Right arm   Pulse: 60    Weight: 73 kg (161 lb)    Height: 144.8 cm (57\")      Body mass index is 34.84 kg/m².    PHYSICAL EXAM:    Vitals Reviewed. "   General Appearance: No acute distress, well developed and well nourished. Obese.  In a wheelchair.  Eyes: Conjunctiva and lids: No erythema, swelling, or discharge. Sclera non-icteric. Glasses.   HENT: Atraumatic, normocephalic. External eyes, ears, and nose normal. No hearing loss noted. Mucous membranes normal. Lips not cyanotic. Neck supple with no tenderness. Wearing mask.   Respiratory: No signs of respiratory distress. Respiration rhythm and depth normal.   Clear to auscultation. No rales, crackles, rhonchi, or wheezing auscultated.   Cardiovascular:  Jugular Venous Pressure: Normal  Heart Rate and Rhythm: Normal, Heart Sounds: Normal S1 and S2. No S3 or S4 noted.  Murmurs: LUSB grade 2/6 murmur present.  No rubs, thrills, or gallops.   Lower Extremities: No edema noted.  Gastrointestinal:  Abdomen soft, non-distended, non-tender.    Musculoskeletal: Normal movement of extremities.  Skin and Nails: General appearance normal. No pallor, cyanosis, diaphoresis. Skin temperature normal. No clubbing of fingernails.   Psychiatric: Patient alert and oriented to person, place, and time. Speech and behavior appropriate. Normal mood and affect.       ECG 12 Lead    Date/Time: 1/26/2022 12:42 PM  Performed by: Luly Pina APRN  Authorized by: Luly Pina APRN   Comparison: compared with previous ECG from 12/17/2021  Similar to previous ECG  Rhythm: sinus rhythm  Rate: normal  BPM: 60  Conduction: non-specific intraventricular conduction delay  QRS axis: normal  Other findings: non-specific ST-T wave changes    Clinical impression: non-specific ECG              Assessment:       Diagnosis Plan   1. Shortness of breath     2. Nonrheumatic aortic valve stenosis     3. History of aortic valve replacement     4. Diastolic dysfunction     5. Pulmonary hypertension (HCC)     6. Nonrheumatic mitral valve regurgitation     7. Nonrheumatic tricuspid valve regurgitation     8. Essential hypertension     9. Paroxysmal  atrial fibrillation (HCC)            Plan:       1.  Dyspnea: Resolved with the increase of spironolactone and the addition of furosemide.  Repeat blood work today.    2/3.  Aortic Stenosis: Status post aortic valve replacement in June 2013.  Stable aortic valve per echocardiogram in 2019.  Echocardiogram pending.    4.  Diastolic Dysfunction: Grade 2 per echocardiogram in 2019.    5.  Pulmonary Hypertension: Severe per echocardiogram in 2019.    6/7/8.  Valvular Heart Disease: Moderate tricuspid and mitral regurgitation.    9.  Hypertension: Blood pressure better, but remains elevated.  Dr. Butcher recommended starting hydralazine 50 mg twice per day.  They will monitor blood pressure at home and call me with an update in a few weeks.    10.  Paroxysmal Atrial Fibrillation: Occurred postoperatively in 2013.  She is reported to brief episodes of palpitations. She is no longer on warfarin.    Atrial Fibrillation and Atrial Flutter  Assessment  • The patient has paroxysmal atrial fibrillation  • The patient's CHADS2-VASc score is 5  • A [id number] score of 2 or more is considered a high risk for a thromboembolic event    12.  I will call her with the echocardiogram and lab work results.  If stable, I recommended a 6-month follow-up visit with Dr. Butcher.    ADDENDUM 1/27/2022:    Echocardiogram Result Text  · Estimated left ventricular EF = 68% Left ventricular systolic function is normal.  · Left ventricular diastolic function is consistent with age.  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 The prosthetic aortic valve peak and mean gradients are elevated.  · Peak velocity of the flow distal to the aortic valve is 294.5 cm/s.  · Mild tricuspid valve regurgitation is present. Estimated right ventricular systolic pressure from tricuspid regurgitation is mildly elevated (35-45 mmHg). Calculated right ventricular systolic pressure from tricuspid regurgitation is 37.7 mmHg.    proBNP now in the normal  range.  CBC showed normal hemoglobin and hematocrit.  Platelets have been consistently low and today they were 60.  BMP abnormal.  Glucose 212, BUN 64, creatinine 2.13.  1 month ago her creatinine was 1.55.    Plan:  · I spoke with her daughter via telephone.  I recommended that she stop her furosemide.  We will see if her shortness of breath worsens.  I think she is dehydrated and I recommended increasing her water intake.  · Repeat BMP next week.  If still elevated, will need to figure out a different plan.      As always, it has been a pleasure to participate in your patient's care. Thank you.       Sincerely,         RIGO Pedro  [facility]      · Dictated utilizing Dragon Dictation  · COVID-19 Precautions - Patient was compliant in wearing a mask. When I saw the patient, I used appropriate personal protective equipment (PPE) including mask and eye shield (standard procedure).  Additionally, I used gown and gloves if indicated.  Hand hygiene was completed before and after seeing the patient.  · I spent 30 minutes reviewing her medical records/testing/previous office notes/labs, face-to-face interaction with patient, physical examination, formulating the plan of care, and discussion of plan of care with patient.

## 2022-01-27 ENCOUNTER — TELEPHONE (OUTPATIENT)
Dept: CARDIOLOGY | Facility: CLINIC | Age: 87
End: 2022-01-27

## 2022-01-27 DIAGNOSIS — R79.89 ELEVATED SERUM CREATININE: Primary | ICD-10-CM

## 2022-01-27 LAB
AORTIC ARCH: 2.2 CM
AORTIC DIMENSIONLESS INDEX: 0.6 (DI)
BH CV ECHO MEAS - ACS: 1.2 CM
BH CV ECHO MEAS - AO MAX PG (FULL): 27.4 MMHG
BH CV ECHO MEAS - AO MAX PG: 34.7 MMHG
BH CV ECHO MEAS - AO MEAN PG (FULL): 12.4 MMHG
BH CV ECHO MEAS - AO MEAN PG: 16.4 MMHG
BH CV ECHO MEAS - AO ROOT AREA (BSA CORRECTED): 1.6
BH CV ECHO MEAS - AO ROOT AREA: 5.7 CM^2
BH CV ECHO MEAS - AO ROOT DIAM: 2.7 CM
BH CV ECHO MEAS - AO V2 MAX: 294.5 CM/SEC
BH CV ECHO MEAS - AO V2 MEAN: 194.2 CM/SEC
BH CV ECHO MEAS - AO V2 VTI: 72.8 CM
BH CV ECHO MEAS - AVA(I,A): 1.6 CM^2
BH CV ECHO MEAS - AVA(I,D): 1.6 CM^2
BH CV ECHO MEAS - AVA(V,A): 1.4 CM^2
BH CV ECHO MEAS - AVA(V,D): 1.4 CM^2
BH CV ECHO MEAS - BSA(HAYCOCK): 1.8 M^2
BH CV ECHO MEAS - BSA: 1.7 M^2
BH CV ECHO MEAS - BZI_BMI: 37 KILOGRAMS/M^2
BH CV ECHO MEAS - BZI_METRIC_HEIGHT: 144.8 CM
BH CV ECHO MEAS - BZI_METRIC_WEIGHT: 77.6 KG
BH CV ECHO MEAS - EDV(CUBED): 127.5 ML
BH CV ECHO MEAS - EDV(MOD-SP2): 64 ML
BH CV ECHO MEAS - EDV(MOD-SP4): 56 ML
BH CV ECHO MEAS - EDV(TEICH): 120.1 ML
BH CV ECHO MEAS - EF(CUBED): 66.5 %
BH CV ECHO MEAS - EF(MOD-BP): 67.8 %
BH CV ECHO MEAS - EF(MOD-SP2): 68.8 %
BH CV ECHO MEAS - EF(MOD-SP4): 66.1 %
BH CV ECHO MEAS - EF(TEICH): 57.7 %
BH CV ECHO MEAS - ESV(CUBED): 42.8 ML
BH CV ECHO MEAS - ESV(MOD-SP2): 20 ML
BH CV ECHO MEAS - ESV(MOD-SP4): 19 ML
BH CV ECHO MEAS - ESV(TEICH): 50.8 ML
BH CV ECHO MEAS - FS: 30.5 %
BH CV ECHO MEAS - IVS/LVPW: 0.91
BH CV ECHO MEAS - IVSD: 0.92 CM
BH CV ECHO MEAS - LAT PEAK E' VEL: 8.7 CM/SEC
BH CV ECHO MEAS - LV DIASTOLIC VOL/BSA (35-75): 33.3 ML/M^2
BH CV ECHO MEAS - LV MASS(C)D: 175 GRAMS
BH CV ECHO MEAS - LV MASS(C)DI: 104 GRAMS/M^2
BH CV ECHO MEAS - LV MAX PG: 7.3 MMHG
BH CV ECHO MEAS - LV MEAN PG: 4 MMHG
BH CV ECHO MEAS - LV SYSTOLIC VOL/BSA (12-30): 11.3 ML/M^2
BH CV ECHO MEAS - LV V1 MAX: 135 CM/SEC
BH CV ECHO MEAS - LV V1 MEAN: 93.9 CM/SEC
BH CV ECHO MEAS - LV V1 VTI: 36.9 CM
BH CV ECHO MEAS - LVIDD: 5 CM
BH CV ECHO MEAS - LVIDS: 3.5 CM
BH CV ECHO MEAS - LVLD AP2: 7.4 CM
BH CV ECHO MEAS - LVLD AP4: 6.8 CM
BH CV ECHO MEAS - LVLS AP2: 5.9 CM
BH CV ECHO MEAS - LVLS AP4: 6.2 CM
BH CV ECHO MEAS - LVOT AREA (M): 3.1 CM^2
BH CV ECHO MEAS - LVOT AREA: 3.1 CM^2
BH CV ECHO MEAS - LVOT DIAM: 2 CM
BH CV ECHO MEAS - LVPWD: 1 CM
BH CV ECHO MEAS - MED PEAK E' VEL: 4.1 CM/SEC
BH CV ECHO MEAS - MR MAX PG: 94.7 MMHG
BH CV ECHO MEAS - MR MAX VEL: 486.5 CM/SEC
BH CV ECHO MEAS - MV A DUR: 0.13 SEC
BH CV ECHO MEAS - MV A MAX VEL: 87.7 CM/SEC
BH CV ECHO MEAS - MV DEC SLOPE: 569.3 CM/SEC^2
BH CV ECHO MEAS - MV DEC TIME: 0.21 SEC
BH CV ECHO MEAS - MV E MAX VEL: 144 CM/SEC
BH CV ECHO MEAS - MV E/A: 1.6
BH CV ECHO MEAS - MV MAX PG: 7.4 MMHG
BH CV ECHO MEAS - MV MEAN PG: 2.3 MMHG
BH CV ECHO MEAS - MV P1/2T MAX VEL: 141.2 CM/SEC
BH CV ECHO MEAS - MV P1/2T: 72.6 MSEC
BH CV ECHO MEAS - MV V2 MAX: 135.7 CM/SEC
BH CV ECHO MEAS - MV V2 MEAN: 68.9 CM/SEC
BH CV ECHO MEAS - MV V2 VTI: 39.9 CM
BH CV ECHO MEAS - MVA P1/2T LCG: 1.6 CM^2
BH CV ECHO MEAS - MVA(P1/2T): 3 CM^2
BH CV ECHO MEAS - MVA(VTI): 2.9 CM^2
BH CV ECHO MEAS - PA ACC TIME: 0.08 SEC
BH CV ECHO MEAS - PA MAX PG (FULL): 1.8 MMHG
BH CV ECHO MEAS - PA MAX PG: 3.7 MMHG
BH CV ECHO MEAS - PA PR(ACCEL): 42.2 MMHG
BH CV ECHO MEAS - PA V2 MAX: 96.4 CM/SEC
BH CV ECHO MEAS - PI END-D VEL: 112.7 CM/SEC
BH CV ECHO MEAS - PULM A REVS DUR: 0.14 SEC
BH CV ECHO MEAS - PULM A REVS VEL: 32.8 CM/SEC
BH CV ECHO MEAS - PULM DIAS VEL: 34.8 CM/SEC
BH CV ECHO MEAS - PULM S/D: 1.2
BH CV ECHO MEAS - PULM SYS VEL: 40.9 CM/SEC
BH CV ECHO MEAS - PVA(V,A): 3.4 CM^2
BH CV ECHO MEAS - PVA(V,D): 3.4 CM^2
BH CV ECHO MEAS - QP/QS: 0.63
BH CV ECHO MEAS - RAP SYSTOLE: 3 MMHG
BH CV ECHO MEAS - RV MAX PG: 1.9 MMHG
BH CV ECHO MEAS - RV MEAN PG: 0.79 MMHG
BH CV ECHO MEAS - RV V1 MAX: 69 CM/SEC
BH CV ECHO MEAS - RV V1 MEAN: 40.1 CM/SEC
BH CV ECHO MEAS - RV V1 VTI: 15.3 CM
BH CV ECHO MEAS - RVOT AREA: 4.8 CM^2
BH CV ECHO MEAS - RVOT DIAM: 2.5 CM
BH CV ECHO MEAS - RVSP: 37.7 MMHG
BH CV ECHO MEAS - SI(AO): 245.5 ML/M^2
BH CV ECHO MEAS - SI(CUBED): 50.4 ML/M^2
BH CV ECHO MEAS - SI(LVOT): 68.8 ML/M^2
BH CV ECHO MEAS - SI(MOD-SP2): 26.1 ML/M^2
BH CV ECHO MEAS - SI(MOD-SP4): 22 ML/M^2
BH CV ECHO MEAS - SI(TEICH): 41.2 ML/M^2
BH CV ECHO MEAS - SV(AO): 413.1 ML
BH CV ECHO MEAS - SV(CUBED): 84.7 ML
BH CV ECHO MEAS - SV(LVOT): 115.8 ML
BH CV ECHO MEAS - SV(MOD-SP2): 44 ML
BH CV ECHO MEAS - SV(MOD-SP4): 37 ML
BH CV ECHO MEAS - SV(RVOT): 73.4 ML
BH CV ECHO MEAS - SV(TEICH): 69.3 ML
BH CV ECHO MEAS - TAPSE (>1.6): 1.7 CM
BH CV ECHO MEAS - TR MAX VEL: 294.4 CM/SEC
BH CV ECHO MEASUREMENTS AVERAGE E/E' RATIO: 22.5
BH CV XLRA - RV BASE: 3.7 CM
BH CV XLRA - RV LENGTH: 7.4 CM
BH CV XLRA - TDI S': 8.6 CM/SEC
LEFT ATRIUM VOLUME INDEX: 49 ML/M2
LV EF 2D ECHO EST: 68 %
MAXIMAL PREDICTED HEART RATE: 130 BPM
SINUS: 2.6 CM
STJ: 2.3 CM
STRESS TARGET HR: 111 BPM

## 2022-01-27 NOTE — TELEPHONE ENCOUNTER
----- Message from RIGO Elias sent at 1/26/2022  2:19 PM EST -----    Call Radha with echo and lab results [phone]

## 2022-01-27 NOTE — TELEPHONE ENCOUNTER
Echocardiogram Result Text  · Estimated left ventricular EF = 68% Left ventricular systolic function is normal.  · Left ventricular diastolic function is consistent with age.  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 The prosthetic aortic valve peak and mean gradients are elevated.  · Peak velocity of the flow distal to the aortic valve is 294.5 cm/s.  · Mild tricuspid valve regurgitation is present. Estimated right ventricular systolic pressure from tricuspid regurgitation is mildly elevated (35-45 mmHg). Calculated right ventricular systolic pressure from tricuspid regurgitation is 37.7 mmHg.    proBNP now in the normal range.  CBC showed normal hemoglobin and hematocrit.  Platelets have been consistently low and today they were 60.  BMP abnormal.  Glucose 212, BUN 64, creatinine 2.13.  1 month ago her creatinine was 1.55.      Plan:  · I spoke with her daughter via telephone.  I recommended that she stop her furosemide.  We will see if her shortness of breath worsens.  I think she is dehydrated and I recommended increasing her water intake.  · Repeat BMP next week.  If still elevated, will need to figure out a different plan.

## 2022-01-27 NOTE — TELEPHONE ENCOUNTER
Radha calling about appt. clarification concerning CT chest and seeing Kelley kali for follow up. researched chart and informed her these appts were scheduled by Dr. Vazquez's office. Radha expressed understanding.    ARLET

## 2022-02-14 ENCOUNTER — TELEPHONE (OUTPATIENT)
Dept: CARDIOLOGY | Facility: CLINIC | Age: 87
End: 2022-02-14

## 2022-02-14 DIAGNOSIS — N18.9 CHRONIC KIDNEY DISEASE, UNSPECIFIED CKD STAGE: ICD-10-CM

## 2022-02-14 DIAGNOSIS — I10 ESSENTIAL HYPERTENSION: Primary | ICD-10-CM

## 2022-02-14 NOTE — TELEPHONE ENCOUNTER
Patient called and stated that she was supposed to get labs done and she wanted to know if her PCP could draw them tomorrow when she is there?  Please advise.    CB: [phone]    Thanks,  Kerry

## 2022-02-14 NOTE — TELEPHONE ENCOUNTER
The PCP will not take my lab order for a BMP.  If she wants to ask the physician to check a BMP in their office she may do so.  I would be okay with that.  Thank you.

## 2022-02-16 ENCOUNTER — LAB (OUTPATIENT)
Dept: LAB | Facility: HOSPITAL | Age: 87
End: 2022-02-16

## 2022-02-16 DIAGNOSIS — R79.89 ELEVATED SERUM CREATININE: ICD-10-CM

## 2022-02-16 LAB
ANION GAP SERPL CALCULATED.3IONS-SCNC: 14 MMOL/L (ref 5–15)
BUN SERPL-MCNC: 32 MG/DL (ref 8–23)
BUN/CREAT SERPL: 15.8 (ref 7–25)
CALCIUM SPEC-SCNC: 8.7 MG/DL (ref 8.2–9.6)
CHLORIDE SERPL-SCNC: 102 MMOL/L (ref 98–107)
CO2 SERPL-SCNC: 17 MMOL/L (ref 22–29)
CREAT SERPL-MCNC: 2.02 MG/DL (ref 0.57–1)
GFR SERPL CREATININE-BSD FRML MDRD: 23 ML/MIN/1.73
GLUCOSE SERPL-MCNC: 183 MG/DL (ref 65–99)
POTASSIUM SERPL-SCNC: 4.4 MMOL/L (ref 3.5–5.2)
SODIUM SERPL-SCNC: 133 MMOL/L (ref 136–145)

## 2022-02-16 PROCEDURE — 80048 BASIC METABOLIC PNL TOTAL CA: CPT

## 2022-02-16 PROCEDURE — 36415 COLL VENOUS BLD VENIPUNCTURE: CPT

## 2022-02-18 NOTE — TELEPHONE ENCOUNTER
Patient is 89.  She is status post aortic valve replacement and has hypertension.  Repeat echocardiogram showed LVEF 60%, diastolic dysfunction, and normal RVSP    In December she was experiencing dyspnea and her creatinine was 1.5.  I increased her spironolactone to 25 mg daily and furosemide 40 mg daily.      She just saw me and her shortness of breath has resolved.  But her follow-up creatinine was 2.13.      I stopped her furosemide and continued the spironolactone.  A week later her BMP shows creatinine still at 2.0.    Should stop her spironolactone?  She is also on losartan 100 mg daily.  Blood pressure runs high 154/50.

## 2022-02-18 NOTE — TELEPHONE ENCOUNTER
I spoke with daughter, Radha via telephone.  She is not taking the furosemide.  I recommended stopping the spironolactone.  Continue the hydralazine 50 mg twice per day.  Repeat BMP in 1 week.    Her daughter says that she is already had worsening dyspnea with exertion stopping the furosemide.  I asked her to continue to monitor and we will see what her kidney function is off diuretic therapy next week.

## 2022-02-22 ENCOUNTER — TELEPHONE (OUTPATIENT)
Dept: CARDIOLOGY | Facility: CLINIC | Age: 87
End: 2022-02-22

## 2022-02-22 NOTE — TELEPHONE ENCOUNTER
Notified pt's daughter. Scheduled to see KH tomorrow. Pt will get labs drawn before appt.    Thank you,    Olga Marie RN  Triage [facility]

## 2022-02-22 NOTE — TELEPHONE ENCOUNTER
Luly    Pt's daughter called the office today. Helena weighed around 160 lbs at the end of January, lasix and spironolactone were stopped. Now, she is up to 183 lbs. She is complaining of shortness of breath, feeling like she is going to pass out, weakness and swelling in her feet. Today her B/P is 143/73, HR 48. Her daughter also said she has been getting B/P readings with the systolic in the 170s-180s.    Do you have any recommendations for them?    Thank you,    Olga Marie, RN  [place] [facility]

## 2022-02-23 ENCOUNTER — LAB (OUTPATIENT)
Dept: LAB | Facility: HOSPITAL | Age: 87
End: 2022-02-23

## 2022-02-23 ENCOUNTER — OFFICE VISIT (OUTPATIENT)
Dept: CARDIOLOGY | Facility: CLINIC | Age: 87
End: 2022-02-23

## 2022-02-23 ENCOUNTER — HOSPITAL ENCOUNTER (OUTPATIENT)
Dept: CARDIOLOGY | Facility: HOSPITAL | Age: 87
Discharge: HOME | End: 2022-02-23

## 2022-02-23 VITALS
WEIGHT: 186 LBS | BODY MASS INDEX: 40.13 KG/M2 | HEIGHT: 57 IN | HEART RATE: 51 BPM | DIASTOLIC BLOOD PRESSURE: 70 MMHG | SYSTOLIC BLOOD PRESSURE: 130 MMHG

## 2022-02-23 DIAGNOSIS — N18.9 CHRONIC KIDNEY DISEASE, UNSPECIFIED CKD STAGE: ICD-10-CM

## 2022-02-23 DIAGNOSIS — I10 ESSENTIAL HYPERTENSION: ICD-10-CM

## 2022-02-23 DIAGNOSIS — I50.33 ACUTE ON CHRONIC DIASTOLIC CHF (CONGESTIVE HEART FAILURE): Primary | ICD-10-CM

## 2022-02-23 DIAGNOSIS — R06.09 DOE (DYSPNEA ON EXERTION): ICD-10-CM

## 2022-02-23 LAB
ANION GAP SERPL CALCULATED.3IONS-SCNC: 11.7 MMOL/L (ref 5–15)
BUN SERPL-MCNC: 47 MG/DL (ref 8–23)
BUN/CREAT SERPL: 22.9 (ref 7–25)
CALCIUM SPEC-SCNC: 8.7 MG/DL (ref 8.2–9.6)
CHLORIDE SERPL-SCNC: 104 MMOL/L (ref 98–107)
CO2 SERPL-SCNC: 14.3 MMOL/L (ref 22–29)
CREAT SERPL-MCNC: 2.05 MG/DL (ref 0.57–1)
GFR SERPL CREATININE-BSD FRML MDRD: 23 ML/MIN/1.73
GLUCOSE SERPL-MCNC: 170 MG/DL (ref 65–99)
NT-PROBNP SERPL-MCNC: 7678 PG/ML (ref 0–1800)
POTASSIUM SERPL-SCNC: 5.5 MMOL/L (ref 3.5–5.2)
SODIUM SERPL-SCNC: 130 MMOL/L (ref 136–145)

## 2022-02-23 PROCEDURE — 36415 COLL VENOUS BLD VENIPUNCTURE: CPT

## 2022-02-23 PROCEDURE — 93000 ELECTROCARDIOGRAM COMPLETE: CPT | Performed by: NURSE PRACTITIONER

## 2022-02-23 PROCEDURE — 96374 THER/PROPH/DIAG INJ IV PUSH: CPT

## 2022-02-23 PROCEDURE — 80048 BASIC METABOLIC PNL TOTAL CA: CPT

## 2022-02-23 PROCEDURE — 25010000002 FUROSEMIDE PER 20 MG: Performed by: NURSE PRACTITIONER

## 2022-02-23 PROCEDURE — 83880 ASSAY OF NATRIURETIC PEPTIDE: CPT | Performed by: NURSE PRACTITIONER

## 2022-02-23 PROCEDURE — 99215 OFFICE O/P EST HI 40 MIN: CPT | Performed by: NURSE PRACTITIONER

## 2022-02-23 RX ORDER — FUROSEMIDE 40 MG/1
40 TABLET ORAL DAILY
Qty: 30 TABLET | Refills: 0 | Status: SHIPPED | OUTPATIENT
Start: 2022-02-23 | End: 2022-03-09 | Stop reason: HOSPADM

## 2022-02-23 RX ORDER — FUROSEMIDE 10 MG/ML
40 INJECTION INTRAMUSCULAR; INTRAVENOUS ONCE
Status: COMPLETED | OUTPATIENT
Start: 2022-02-23 | End: 2022-02-23

## 2022-02-23 RX ADMIN — FUROSEMIDE 40 MG: 10 INJECTION, SOLUTION INTRAMUSCULAR; INTRAVENOUS at 15:30

## 2022-02-23 NOTE — PROGRESS NOTES
Date of Office Visit: 2022  Encounter Provider: Joceline Vázquez, SHELTON, APRN  Place of Service: [facility] CARDIOLOGY  Patient Name: Helena Carmen  :[id number]        Subjective:     Chief Complaint:  Acute on chronic diastolic CHF, hypertension      History of Present Illness:  Helena Carmen is a 89 y.o. female patient of Dr. Butcher.  This patient is new to me and I have reviewed her records.    Patient has a history of valvular heart disease, diabetes, hypothyroidism, hypertension, hyperlipidemia, CKD.    In 2013 patient underwent aortic valve replacement for severe aortic stenosis.  She had some postoperative atrial fibrillation and PVC.  She was initially treated with warfarin which was later discontinued as she did not have recurrent atrial fibrillation.  2019 she had an echo showing normal LV systolic function with EF of 56%, grade 2 diastolic dysfunction, mild LVH, moderate to severely dilated left atrial cavity, moderate mitral and tricuspid regurgitation, RVSP of 69 mmHg.  She was treated with Lasix which was later discontinued due to her CKD.  2021 she had hypertension medications were adjusted for better control.    Patient seen in the office 2021 with new onset shortness of breath over the previous couple of months.  She reported occasional wheezing.  She reported a couple episodes of palpitations.  Blood pressure was quite elevated.  proBNP was elevated at 4355.  Spironolactone was increased to 25 mg daily.  Patient was started on furosemide 40 mg daily.  Follow-up proBNP 2022 was normal at 1517.  Cr increased from 1.55 to 2.13.   Lasix was stopped.  BMP a couple weeks later showed Cr of 2.02.  Spironolactone was stopped.  Hydralazine was continued.          Patient presents to office today for follow-up appointment.  Patient's daughter is with her in the office today, per patient preference.  They report patient saw PCP yesterday and was advised  to follow-up with cardiology.  They report patient has been off of spironolactone now for the last 1-1/2 to 2 weeks.  In that time patient has had increased bilateral lower extremity edema as well as some abdominal bloating.  She also has had increased dyspnea with exertion since then and some generalized fatigue/weakness.  Patient reports shortness of breath even with light exertion.  No recent orthopnea and feels ok at rest.  She also reports decreased urination since medications have been adjusted.  Denies any fever or cold-like symptoms.  No sick contacts.  She has experienced a random split-second discomfort to her chest at rest but nothing that sounds anginal in nature and nothing exertional.  Denies any other chest pain or discomfort symptoms.  Denies any palpitations, syncope, near syncope, falls, or abnormal bleeding.  Ambulates at home with a cane.            Past Medical History:   Diagnosis Date   • Aortic valve stenosis     s/p tissue AVR   • Back pain    • Colitis due to Clostridioides difficile 1/26/2022   • Diastolic dysfunction     Grade 2 per echocardiogram 2013   • Diverticulosis    • Exertional shortness of breath    • Hiatal hernia    • Hyperlipidemia    • Hypertension    • Hyperthyroidism    • Hypertriglyceridemia 5/31/2018   • Hypothyroidism    • Left ventricular hypertrophy    • Legally blind    • Macular degeneration    • Mitral regurgitation    • Osteoarthritis of hip    • Pancreatitis 1/26/2022   • Paroxysmal atrial fibrillation (HCC)    • Premature ventricular contractions    • Pulmonary hypertension (HCC)    • Renal insufficiency syndrome    • Type 2 diabetes mellitus (HCC)      Past Surgical History:   Procedure Laterality Date   • AORTIC VALVE REPAIR/REPLACEMENT     • CATARACT EXTRACTION      1970, 1999   • ENDOSCOPY  08/15/2014    no gross lesions in stomach/duodenum, erythrematous mucosa in stomach   • HYSTERECTOMY  2007   • STERNOTOMY       Outpatient Medications Prior to Visit    Medication Sig Dispense Refill   • amLODIPine (NORVASC) 10 MG tablet Take 1 tablet by mouth daily.     • aspirin 81 MG tablet Take by mouth daily.     • fenofibrate (TRICOR) 145 MG tablet Take 1 tablet by mouth daily.     • Ferrous Sulfate (IRON) 325 (65 FE) MG tablet Take 1 tablet by mouth Daily.     • gabapentin (NEURONTIN) 600 MG tablet Take 1 tablet by mouth 2 (two) times a day.     • glyBURIDE-metFORMIN (GLUCOVANCE) 5-500 MG per tablet Take 2 tablets by mouth 2 (two) times a day.     • hydrALAZINE (APRESOLINE) 50 MG tablet Take 1 tablet by mouth 2 (Two) Times a Day. 180 tablet 3   • insulin glargine (LANTUS) 100 UNIT/ML injection Inject 12 Units under the skin into the appropriate area as directed Daily.     • isosorbide mononitrate (IMDUR) 120 MG 24 hr tablet Take 120 mg by mouth Daily.     • levothyroxine (SYNTHROID) 25 MCG tablet Take 1 tablet by mouth daily.     • linagliptin (TRADJENTA) 5 MG tablet tablet Take 5 mg by mouth daily.     • loratadine (CLARITIN) 10 MG tablet Take 1 tablet by mouth daily.     • LORazepam (ATIVAN) 0.5 MG tablet Take 1 tablet by mouth as needed.     • losartan (COZAAR) 100 MG tablet Take one half tablet by mouth twice daily     • metoclopramide (REGLAN) 10 MG tablet Take 1 tablet by mouth 3 (Three) Times a Day Before Meals. 270 tablet 3   • metoprolol tartrate (LOPRESSOR) 50 MG tablet Take 1 tablet by mouth every 12 (twelve) hours.     • montelukast (SINGULAIR) 10 MG tablet Take 1 tablet by mouth daily.     • Multiple Vitamin tablet Take 1 tablet by mouth daily.     • pantoprazole (PROTONIX) 40 MG EC tablet Take 40 mg by mouth Daily.     • rosuvastatin (CRESTOR) 20 MG tablet Take 20 mg by mouth Daily.     • silodosin (Rapaflo) 4 MG capsule capsule Take 4 mg by mouth Daily With Breakfast.     • Vitamin E 400 UNITS tablet Take 1 tablet by mouth daily.     • Unable to find 1 each 1 (One) Time. Ranulfo XL joint pain supplement       No facility-administered medications prior to visit.  "      Allergies as of 2022 - Reviewed 2022   Allergen Reaction Noted   • Erythromycin Unknown (See Comments) 2015   • Keflex [cephalexin] Unknown (See Comments) 2015   • Penicillins Rash 2015   • Sulfa antibiotics Itching and Rash 2015     Social History     Socioeconomic History   • Marital status:    Tobacco Use   • Smoking status: Former Smoker     Packs/day: 0.50     Quit date: 7/10/1969     Years since quittin.6   • Smokeless tobacco: Never Used   Substance and Sexual Activity   • Alcohol use: No     Comment: caffeine use - coffee 2 cups daily   • Drug use: No     Family History   Problem Relation Age of Onset   • Heart disease Mother    • Hypertension Mother    • Stroke Mother    • Diabetes Mother         mellitus   • Other Other         cardiovascular disorder       Review of Systems   Constitutional: Positive for malaise/fatigue.   Cardiovascular:        SEE HPI   Respiratory: Positive for shortness of breath.    Hematologic/Lymphatic: Negative for bleeding problem.   Musculoskeletal: Negative for falls.   Gastrointestinal: Negative for melena.   Genitourinary: Negative for hematuria.   Neurological: Negative for dizziness.   Psychiatric/Behavioral: Negative for altered mental status.             Objective:     Vitals:    22 1425   BP: 130/70   BP Location: Left arm   Patient Position: Sitting   Pulse: 51   Weight: 84.4 kg (186 lb)   Height: 144.8 cm (57\")     Body mass index is 40.25 kg/m².      PHYSICAL EXAM:  Constitutional:       General: Not in acute distress.     Appearance: Well-developed. Not diaphoretic.   HENT:      Head: Normocephalic and atraumatic.   Pulmonary:      Effort: Pulmonary effort is normal. No respiratory distress.      Breath sounds: Normal breath sounds. No wheezing. No rales.   Cardiovascular:      Normal rate. Regular rhythm.      Murmurs: There is a grade 1 to 2/6 systolic murmur at the URSB and ULSB.      No gallop. No click. " No rub.   Edema:     Peripheral edema (Trace to 1+ bilateral feet and ankles) present.  Abdominal:      General: Bowel sounds are normal. There is no distension.      Palpations: Abdomen is soft.   Musculoskeletal:         General: No tenderness or deformity.      Cervical back: Neck supple. Skin:     General: Skin is warm and dry.      Findings: No erythema or rash.   Neurological:      Mental Status: Alert and oriented to person, place, and time.   Psychiatric:         Behavior: Behavior normal.         Judgment: Judgment normal.             ECG 12 Lead    Date/Time: 2/23/2022 4:30 PM  Performed by: Joceline Vázquez DNP, APRN  Authorized by: Joceline Vázquez DNP, RIGO   Comparison: compared with previous ECG from 12/17/2021  Rhythm: sinus rhythm  BPM: 51  Other findings: non-specific ST-T wave changes and low voltage  Comments: No significant changes from previous EKGs              Assessment:       Diagnosis Plan   1. Acute on chronic diastolic CHF (congestive heart failure) (HCC)  furosemide (LASIX) injection 40 mg   2. Essential hypertension     3. ALEMAN (dyspnea on exertion)  proBNP         Plan:     1. Acute on chronic diastolic CHF: complicated by CKD.  We discussed admission however patient does not feel symptoms are bad enough to warrant admission at this time and she declines.  Daughter who is with her in the office today is going to stay with her.  They are agreeable to going to the ER for any significant or worsening symptoms.  Discussed patient case and labs with Dr. Butcher and will give one time dose of lasix 40mg IV in the office today.  Will have patient take PO lasix BID to start and call with update Friday AM or sooner for issues or concerns. Keep appointment with nephrology Monday as scheduled.  Change positions slowly to avoid lightheadedness.    2. Aortic stenosis status post aortic valve replacement: 6/2013.  Elevated prosthetic valve gradients noted on 1/2022 echo.   3. Hypertension:  Blood pressure stable in the office today.  4. Valvular regurgitation: Mitral regurgitation only trace on 1/2022 echo with mild tricuspid regurgitation  5. Pulmonary hypertension: Improved and mild with RVSP of 37.7 mmHg on 1/2022 echo  6. History of postoperative atrial fibrillation: 2013.  No longer on blood thinner without known recurrence.  7. Chronic kidney disease: Follows with Dr. Art Wick.  Has follow-up 2/28/22 which she will keep for follow-up metabolic panel and further recommendations.     Patient to follow-up in the office in 1 week or follow-up sooner if needed for any new, recurrent, or worsening symptoms or concerns.  Discussed in detail signs/symptoms that warrant sooner call or follow-up to the office or ER visit.          Greater than 40 minutes spent on office visit including time spent face-to-face with patient and daughter, time spent reviewing records, time spent coordinating care.             Your medication list          Accurate as of February 23, 2022  4:33 PM. If you have any questions, ask your nurse or doctor.            START taking these medications      Instructions Last Dose Given Next Dose Due   furosemide 40 MG tablet  Commonly known as: LASIX  Started by: Joceline Vázquez DNP, RIGO      Take 1 tablet by mouth Daily.          CONTINUE taking these medications      Instructions Last Dose Given Next Dose Due   aspirin 81 MG tablet      Take by mouth daily.       Claritin 10 MG tablet  Generic drug: loratadine      Take 1 tablet by mouth daily.       ferrous sulfate 325 (65 Fe) MG tablet      Take 1 tablet by mouth Daily.       Glucovance 5-500 MG per tablet  Generic drug: glyBURIDE-metFORMIN      Take 2 tablets by mouth 2 (two) times a day.       hydrALAZINE 50 MG tablet  Commonly known as: APRESOLINE      Take 1 tablet by mouth 2 (Two) Times a Day.       insulin glargine 100 UNIT/ML injection  Commonly known as: LANTUS, SEMGLEE      Inject 12 Units under the skin into the  appropriate area as directed Daily.       isosorbide mononitrate 120 MG 24 hr tablet  Commonly known as: IMDUR      Take 120 mg by mouth Daily.       linagliptin 5 MG tablet tablet  Commonly known as: TRADJENTA      Take 5 mg by mouth daily.       LORazepam 0.5 MG tablet  Commonly known as: ATIVAN      Take 1 tablet by mouth as needed.       losartan 100 MG tablet  Commonly known as: COZAAR      Take one half tablet by mouth twice daily       metoclopramide 10 MG tablet  Commonly known as: Reglan      Take 1 tablet by mouth 3 (Three) Times a Day Before Meals.       metoprolol tartrate 50 MG tablet  Commonly known as: LOPRESSOR      Take 1 tablet by mouth every 12 (twelve) hours.       multivitamin tablet tablet  Commonly known as: THERAGRAN      Take 1 tablet by mouth daily.       Neurontin 600 MG tablet  Generic drug: gabapentin      Take 1 tablet by mouth 2 (two) times a day.       Norvasc 10 MG tablet  Generic drug: amLODIPine      Take 1 tablet by mouth daily.       pantoprazole 40 MG EC tablet  Commonly known as: PROTONIX      Take 40 mg by mouth Daily.       Rapaflo 4 MG capsule capsule  Generic drug: silodosin      Take 4 mg by mouth Daily With Breakfast.       rosuvastatin 20 MG tablet  Commonly known as: CRESTOR      Take 20 mg by mouth Daily.       Singulair 10 MG tablet  Generic drug: montelukast      Take 1 tablet by mouth daily.       Synthroid 25 MCG tablet  Generic drug: levothyroxine      Take 1 tablet by mouth daily.       Tricor 145 MG tablet  Generic drug: fenofibrate      Take 1 tablet by mouth daily.       Unable to find      1 each 1 (One) Time. Ranulfo XL joint pain supplement       Vitamin E 400 units tablet      Take 1 tablet by mouth daily.             Where to Get Your Medications      These medications were sent to [facility] [address] [place] [address] - [id number] [facility] - [phone]  - [phone]   [id number] [facility], [facility] [address]    Phone: [phone]   · furosemide 40 MG  tablet         The above medication changes may not have been made by this provider.  Patient's medication list was updated to reflect medications they are currently taking including medication changes made by other providers.            Thanks,    Joceline Vázquez, SHELTON, APRN  02/23/2022         Dictated utilizing Dragon dictation

## 2022-02-25 ENCOUNTER — TELEPHONE (OUTPATIENT)
Dept: CARDIOLOGY | Facility: CLINIC | Age: 87
End: 2022-02-25

## 2022-02-25 ENCOUNTER — LAB (OUTPATIENT)
Dept: LAB | Facility: HOSPITAL | Age: 87
End: 2022-02-25

## 2022-02-25 DIAGNOSIS — I50.33 ACUTE ON CHRONIC DIASTOLIC CHF (CONGESTIVE HEART FAILURE): Primary | ICD-10-CM

## 2022-02-25 LAB
ANION GAP SERPL CALCULATED.3IONS-SCNC: 15 MMOL/L (ref 5–15)
BUN SERPL-MCNC: 50 MG/DL (ref 8–23)
BUN/CREAT SERPL: 20.5 (ref 7–25)
CALCIUM SPEC-SCNC: 8.9 MG/DL (ref 8.2–9.6)
CHLORIDE SERPL-SCNC: 102 MMOL/L (ref 98–107)
CO2 SERPL-SCNC: 19 MMOL/L (ref 22–29)
CREAT SERPL-MCNC: 2.44 MG/DL (ref 0.57–1)
GFR SERPL CREATININE-BSD FRML MDRD: 19 ML/MIN/1.73
GLUCOSE SERPL-MCNC: 146 MG/DL (ref 65–99)
POTASSIUM SERPL-SCNC: 4.5 MMOL/L (ref 3.5–5.2)
SODIUM SERPL-SCNC: 136 MMOL/L (ref 136–145)

## 2022-02-25 PROCEDURE — 80048 BASIC METABOLIC PNL TOTAL CA: CPT | Performed by: NURSE PRACTITIONER

## 2022-02-25 PROCEDURE — 36415 COLL VENOUS BLD VENIPUNCTURE: CPT | Performed by: NURSE PRACTITIONER

## 2022-03-02 ENCOUNTER — HOSPITAL ENCOUNTER (INPATIENT)
Facility: HOSPITAL | Age: 87
LOS: 7 days | Discharge: NURSING, REHAB OR LONG-TERM CARE | End: 2022-03-09
Attending: EMERGENCY MEDICINE | Admitting: HOSPITALIST

## 2022-03-02 ENCOUNTER — APPOINTMENT (OUTPATIENT)
Dept: GENERAL RADIOLOGY | Facility: HOSPITAL | Age: 87
End: 2022-03-02

## 2022-03-02 ENCOUNTER — OFFICE VISIT (OUTPATIENT)
Dept: CARDIOLOGY | Facility: CLINIC | Age: 87
End: 2022-03-02

## 2022-03-02 VITALS
BODY MASS INDEX: 38.62 KG/M2 | DIASTOLIC BLOOD PRESSURE: 64 MMHG | SYSTOLIC BLOOD PRESSURE: 130 MMHG | HEART RATE: 50 BPM | HEIGHT: 57 IN | WEIGHT: 179 LBS

## 2022-03-02 DIAGNOSIS — I10 ESSENTIAL HYPERTENSION: ICD-10-CM

## 2022-03-02 DIAGNOSIS — I50.43 ACUTE ON CHRONIC COMBINED SYSTOLIC AND DIASTOLIC CONGESTIVE HEART FAILURE: Primary | ICD-10-CM

## 2022-03-02 DIAGNOSIS — N18.9 CHRONIC KIDNEY DISEASE, UNSPECIFIED CKD STAGE: ICD-10-CM

## 2022-03-02 DIAGNOSIS — I50.32 CHRONIC DIASTOLIC CONGESTIVE HEART FAILURE: Primary | ICD-10-CM

## 2022-03-02 PROBLEM — I50.9 CHF (CONGESTIVE HEART FAILURE): Status: ACTIVE | Noted: 2022-03-02

## 2022-03-02 LAB
ALBUMIN SERPL-MCNC: 3.8 G/DL (ref 3.5–5.2)
ALBUMIN/GLOB SERPL: 1.3 G/DL
ALP SERPL-CCNC: 31 U/L (ref 39–117)
ALT SERPL W P-5'-P-CCNC: 20 U/L (ref 1–33)
ANION GAP SERPL CALCULATED.3IONS-SCNC: 15.9 MMOL/L (ref 5–15)
AST SERPL-CCNC: 27 U/L (ref 1–32)
BASOPHILS # BLD AUTO: 0.03 10*3/MM3 (ref 0–0.2)
BASOPHILS NFR BLD AUTO: 0.5 % (ref 0–1.5)
BILIRUB SERPL-MCNC: 0.3 MG/DL (ref 0–1.2)
BUN SERPL-MCNC: 63 MG/DL (ref 8–23)
BUN/CREAT SERPL: 22.6 (ref 7–25)
CALCIUM SPEC-SCNC: 9.2 MG/DL (ref 8.2–9.6)
CHLORIDE SERPL-SCNC: 94 MMOL/L (ref 98–107)
CO2 SERPL-SCNC: 21.1 MMOL/L (ref 22–29)
CREAT SERPL-MCNC: 2.79 MG/DL (ref 0.57–1)
DEPRECATED RDW RBC AUTO: 49.4 FL (ref 37–54)
EGFRCR SERPLBLD CKD-EPI 2021: 15.6 ML/MIN/1.73
EOSINOPHIL # BLD AUTO: 0.11 10*3/MM3 (ref 0–0.4)
EOSINOPHIL NFR BLD AUTO: 1.9 % (ref 0.3–6.2)
ERYTHROCYTE [DISTWIDTH] IN BLOOD BY AUTOMATED COUNT: 14.4 % (ref 12.3–15.4)
GLOBULIN UR ELPH-MCNC: 3 GM/DL
GLUCOSE BLDC GLUCOMTR-MCNC: 80 MG/DL (ref 70–130)
GLUCOSE SERPL-MCNC: 172 MG/DL (ref 65–99)
HCT VFR BLD AUTO: 38 % (ref 34–46.6)
HGB BLD-MCNC: 12.2 G/DL (ref 12–15.9)
LYMPHOCYTES # BLD AUTO: 1.46 10*3/MM3 (ref 0.7–3.1)
LYMPHOCYTES NFR BLD AUTO: 25.4 % (ref 19.6–45.3)
MAGNESIUM SERPL-MCNC: 1.5 MG/DL (ref 1.7–2.3)
MCH RBC QN AUTO: 30.5 PG (ref 26.6–33)
MCHC RBC AUTO-ENTMCNC: 32.1 G/DL (ref 31.5–35.7)
MCV RBC AUTO: 95 FL (ref 79–97)
MONOCYTES # BLD AUTO: 0.54 10*3/MM3 (ref 0.1–0.9)
MONOCYTES NFR BLD AUTO: 9.4 % (ref 5–12)
NEUTROPHILS NFR BLD AUTO: 3.59 10*3/MM3 (ref 1.7–7)
NEUTROPHILS NFR BLD AUTO: 62.6 % (ref 42.7–76)
NT-PROBNP SERPL-MCNC: 6928 PG/ML (ref 0–1800)
PLAT MORPH BLD: NORMAL
PLATELET # BLD AUTO: 39 10*3/MM3 (ref 140–450)
PMV BLD AUTO: 12.9 FL (ref 6–12)
POTASSIUM SERPL-SCNC: 4.8 MMOL/L (ref 3.5–5.2)
PROT SERPL-MCNC: 6.8 G/DL (ref 6–8.5)
RBC # BLD AUTO: 4 10*6/MM3 (ref 3.77–5.28)
RBC MORPH BLD: NORMAL
SARS-COV-2 RNA RESP QL NAA+PROBE: NOT DETECTED
SODIUM SERPL-SCNC: 131 MMOL/L (ref 136–145)
TROPONIN T SERPL-MCNC: 0.02 NG/ML (ref 0–0.03)
WBC MORPH BLD: NORMAL
WBC NRBC COR # BLD: 5.74 10*3/MM3 (ref 3.4–10.8)

## 2022-03-02 PROCEDURE — 93000 ELECTROCARDIOGRAM COMPLETE: CPT | Performed by: NURSE PRACTITIONER

## 2022-03-02 PROCEDURE — 99284 EMERGENCY DEPT VISIT MOD MDM: CPT

## 2022-03-02 PROCEDURE — 85007 BL SMEAR W/DIFF WBC COUNT: CPT | Performed by: EMERGENCY MEDICINE

## 2022-03-02 PROCEDURE — 80053 COMPREHEN METABOLIC PANEL: CPT | Performed by: EMERGENCY MEDICINE

## 2022-03-02 PROCEDURE — 85025 COMPLETE CBC W/AUTO DIFF WBC: CPT | Performed by: EMERGENCY MEDICINE

## 2022-03-02 PROCEDURE — 82962 GLUCOSE BLOOD TEST: CPT

## 2022-03-02 PROCEDURE — 93005 ELECTROCARDIOGRAM TRACING: CPT

## 2022-03-02 PROCEDURE — 25010000002 MAGNESIUM SULFATE 2 GM/50ML SOLUTION: Performed by: EMERGENCY MEDICINE

## 2022-03-02 PROCEDURE — 83880 ASSAY OF NATRIURETIC PEPTIDE: CPT | Performed by: EMERGENCY MEDICINE

## 2022-03-02 PROCEDURE — 71045 X-RAY EXAM CHEST 1 VIEW: CPT

## 2022-03-02 PROCEDURE — U0005 INFEC AGEN DETEC AMPLI PROBE: HCPCS | Performed by: EMERGENCY MEDICINE

## 2022-03-02 PROCEDURE — 93010 ELECTROCARDIOGRAM REPORT: CPT | Performed by: INTERNAL MEDICINE

## 2022-03-02 PROCEDURE — U0003 INFECTIOUS AGENT DETECTION BY NUCLEIC ACID (DNA OR RNA); SEVERE ACUTE RESPIRATORY SYNDROME CORONAVIRUS 2 (SARS-COV-2) (CORONAVIRUS DISEASE [COVID-19]), AMPLIFIED PROBE TECHNIQUE, MAKING USE OF HIGH THROUGHPUT TECHNOLOGIES AS DESCRIBED BY CMS-2020-01-R: HCPCS | Performed by: EMERGENCY MEDICINE

## 2022-03-02 PROCEDURE — 93005 ELECTROCARDIOGRAM TRACING: CPT | Performed by: EMERGENCY MEDICINE

## 2022-03-02 PROCEDURE — 84484 ASSAY OF TROPONIN QUANT: CPT | Performed by: EMERGENCY MEDICINE

## 2022-03-02 PROCEDURE — 99214 OFFICE O/P EST MOD 30 MIN: CPT | Performed by: NURSE PRACTITIONER

## 2022-03-02 PROCEDURE — 83735 ASSAY OF MAGNESIUM: CPT | Performed by: EMERGENCY MEDICINE

## 2022-03-02 RX ORDER — FUROSEMIDE 10 MG/ML
40 INJECTION INTRAMUSCULAR; INTRAVENOUS EVERY 12 HOURS
Status: DISCONTINUED | OUTPATIENT
Start: 2022-03-02 | End: 2022-03-03

## 2022-03-02 RX ORDER — BUMETANIDE 2 MG/1
2 TABLET ORAL ONCE
Status: COMPLETED | OUTPATIENT
Start: 2022-03-02 | End: 2022-03-02

## 2022-03-02 RX ORDER — SODIUM CHLORIDE 0.9 % (FLUSH) 0.9 %
10 SYRINGE (ML) INJECTION AS NEEDED
Status: DISCONTINUED | OUTPATIENT
Start: 2022-03-02 | End: 2022-03-09 | Stop reason: HOSPADM

## 2022-03-02 RX ORDER — INSULIN LISPRO 100 [IU]/ML
0-7 INJECTION, SOLUTION INTRAVENOUS; SUBCUTANEOUS
Status: DISCONTINUED | OUTPATIENT
Start: 2022-03-03 | End: 2022-03-03

## 2022-03-02 RX ORDER — HYDRALAZINE HYDROCHLORIDE 100 MG/1
100 TABLET, FILM COATED ORAL 2 TIMES DAILY
Qty: 60 TABLET | Refills: 0
Start: 2022-03-02 | End: 2022-03-09 | Stop reason: HOSPADM

## 2022-03-02 RX ORDER — MAGNESIUM SULFATE HEPTAHYDRATE 40 MG/ML
2 INJECTION, SOLUTION INTRAVENOUS ONCE
Status: COMPLETED | OUTPATIENT
Start: 2022-03-02 | End: 2022-03-02

## 2022-03-02 RX ADMIN — MAGNESIUM SULFATE HEPTAHYDRATE 2 G: 2 INJECTION, SOLUTION INTRAVENOUS at 18:01

## 2022-03-02 RX ADMIN — BUMETANIDE 2 MG: 2 TABLET ORAL at 18:09

## 2022-03-02 NOTE — ED TRIAGE NOTES
"Patient here from Dr. Mazariegos office c/o SOA, swelling in the hands feet and stomach, nausea, weakness, and shakiness that started 2 weeks ago. Patient has been seen by \"multiple doctors\", but patient has not improved.     Patient wearing mask during triage. RN wearing appropriate PPE during triage. Hand hygiene performed.  "

## 2022-03-02 NOTE — PROGRESS NOTES
Date of Office Visit: 2022  Encounter Provider: Joceline Vázquez, SHELTON, APRN  Place of Service: [facility] CARDIOLOGY  Patient Name: Helena Carmen  :[id number]        Subjective:     Chief Complaint:  Acute on chronic diastolic CHF, hypertension      History of Present Illness:  Helena Caremn is a 89 y.o. female patient of Dr. Butcher.  I am seeing this patient in the office today and I have reviewed her records.    Patient has a history of valvular heart disease, diabetes, hypothyroidism, hypertension, hyperlipidemia, CKD.       Patient has a history of valvular heart disease, diabetes, hypothyroidism, hypertension, hyperlipidemia, CKD.     In 2013 patient underwent aortic valve replacement for severe aortic stenosis.  She had some postoperative atrial fibrillation and PVC.  She was initially treated with warfarin which was later discontinued as she did not have recurrent atrial fibrillation.  2019 she had an echo showing normal LV systolic function with EF of 56%, grade 2 diastolic dysfunction, mild LVH, moderate to severely dilated left atrial cavity, moderate mitral and tricuspid regurgitation, RVSP of 69 mmHg.  She was treated with Lasix which was later discontinued due to her CKD.  2021 she had hypertension medications were adjusted for better control.     Patient seen in the office 2021 with new onset shortness of breath over the previous couple of months.  She reported occasional wheezing.  She reported a couple episodes of palpitations.  Blood pressure was quite elevated.  proBNP was elevated at 4355.  Spironolactone was increased to 25 mg daily.  Patient was started on furosemide 40 mg daily.  Follow-up proBNP 2022 was normal at 1517.  Cr increased from 1.55 to 2.13.   Lasix was stopped.  BMP a couple weeks later showed Cr of 2.02.  Spironolactone was stopped.  Hydralazine was continued.      She was seen back in the office 2022 with increased  dyspnea and bilateral lower extremity edema.  She declined admission and wished to be managed as an outpatient.  Plan was discussed with Dr. Hadley MD.  Patient was given dose of IV Lasix and p.o. Lasix was restarted at 40 mg twice daily.  Patient saw nephrology 2/28/22 and stop losartan, increased hydralazine, and decrease Lasix to once a day.        Patient presents to office today for follow-up appointment.  Patient's daughter is with her in the office today, per patient preference.  They report that patient was feeling improved on the twice a day Lasix but after decreasing Lasix she is feeling worse today.  She continues to feel like she is swelling and short of breath.  Denies chest pain or discomfort, palpitations, syncope, near syncope, falls, or abnormal bleeding.  Continues to have fatigue and generalized weakness.          Past Medical History:   Diagnosis Date   • Aortic valve stenosis     s/p tissue AVR   • Back pain    • Colitis due to Clostridioides difficile 1/26/2022   • Diastolic dysfunction     Grade 2 per echocardiogram 2013   • Diverticulosis    • Exertional shortness of breath    • Hiatal hernia    • Hyperlipidemia    • Hypertension    • Hyperthyroidism    • Hypertriglyceridemia 5/31/2018   • Hypothyroidism    • Left ventricular hypertrophy    • Legally blind    • Macular degeneration    • Mitral regurgitation    • Osteoarthritis of hip    • Pancreatitis 1/26/2022   • Paroxysmal atrial fibrillation (HCC)    • Premature ventricular contractions    • Pulmonary hypertension (HCC)    • Renal insufficiency syndrome    • Type 2 diabetes mellitus (HCC)      Past Surgical History:   Procedure Laterality Date   • AORTIC VALVE REPAIR/REPLACEMENT     • CATARACT EXTRACTION      1970, 1999   • ENDOSCOPY  08/15/2014    no gross lesions in stomach/duodenum, erythrematous mucosa in stomach   • HYSTERECTOMY  2007   • STERNOTOMY       Outpatient Medications Prior to Visit   Medication Sig Dispense Refill   •  amLODIPine (NORVASC) 10 MG tablet Take 1 tablet by mouth daily.     • aspirin 81 MG tablet Take by mouth daily.     • fenofibrate (TRICOR) 145 MG tablet Take 1 tablet by mouth daily.     • Ferrous Sulfate (IRON) 325 (65 FE) MG tablet Take 1 tablet by mouth Daily.     • furosemide (LASIX) 40 MG tablet Take 1 tablet by mouth Daily. 30 tablet 0   • gabapentin (NEURONTIN) 600 MG tablet Take 1 tablet by mouth 2 (two) times a day.     • glyBURIDE-metFORMIN (GLUCOVANCE) 5-500 MG per tablet Take 2 tablets by mouth 2 (two) times a day.     • insulin glargine (LANTUS) 100 UNIT/ML injection Inject 12 Units under the skin into the appropriate area as directed Daily.     • isosorbide mononitrate (IMDUR) 120 MG 24 hr tablet Take 120 mg by mouth Daily.     • levothyroxine (SYNTHROID) 25 MCG tablet Take 1 tablet by mouth daily.     • linagliptin (TRADJENTA) 5 MG tablet tablet Take 5 mg by mouth daily.     • loratadine (CLARITIN) 10 MG tablet Take 1 tablet by mouth daily.     • LORazepam (ATIVAN) 0.5 MG tablet Take 1 tablet by mouth as needed.     • metoclopramide (REGLAN) 10 MG tablet Take 1 tablet by mouth 3 (Three) Times a Day Before Meals. 270 tablet 3   • metoprolol tartrate (LOPRESSOR) 50 MG tablet Take 1 tablet by mouth every 12 (twelve) hours.     • montelukast (SINGULAIR) 10 MG tablet Take 1 tablet by mouth daily.     • Multiple Vitamin tablet Take 1 tablet by mouth daily.     • pantoprazole (PROTONIX) 40 MG EC tablet Take 40 mg by mouth Daily.     • rosuvastatin (CRESTOR) 20 MG tablet Take 20 mg by mouth Daily.     • silodosin (Rapaflo) 4 MG capsule capsule Take 4 mg by mouth Daily With Breakfast.     • Vitamin E 400 UNITS tablet Take 1 tablet by mouth daily.     • hydrALAZINE (APRESOLINE) 50 MG tablet Take 1 tablet by mouth 2 (Two) Times a Day. (Patient taking differently: Take 100 mg by mouth 2 (Two) Times a Day.) 180 tablet 3   • losartan (COZAAR) 100 MG tablet Take one half tablet by mouth twice daily       No  "facility-administered medications prior to visit.       Allergies as of 2022 - Reviewed 2022   Allergen Reaction Noted   • Erythromycin Unknown (See Comments) 2015   • Keflex [cephalexin] Unknown (See Comments) 2015   • Penicillins Rash 2015   • Sulfa antibiotics Itching and Rash 2015     Social History     Socioeconomic History   • Marital status:    Tobacco Use   • Smoking status: Former Smoker     Packs/day: 0.50     Quit date: 7/10/1969     Years since quittin.6   • Smokeless tobacco: Never Used   Substance and Sexual Activity   • Alcohol use: No     Comment: caffeine use - coffee 2 cups daily   • Drug use: No     Family History   Problem Relation Age of Onset   • Heart disease Mother    • Hypertension Mother    • Stroke Mother    • Diabetes Mother         mellitus   • Other Other         cardiovascular disorder       Review of Systems   Constitutional: Positive for malaise/fatigue.   Cardiovascular:        SEE HPI   Respiratory: Positive for shortness of breath.    Hematologic/Lymphatic: Negative for bleeding problem.   Musculoskeletal: Negative for falls.   Gastrointestinal: Negative for melena.   Genitourinary: Negative for hematuria.   Psychiatric/Behavioral: Negative for altered mental status.          Objective:     Vitals:    22 1426   BP: 130/64   BP Location: Left arm   Patient Position: Sitting   Pulse: 50   Weight: 81.2 kg (179 lb)   Height: 144.8 cm (57\")     Body mass index is 38.74 kg/m².      PHYSICAL EXAM:  Constitutional:       General: Not in acute distress.     Appearance: Well-developed. Obese. Not diaphoretic.   Eyes:      Pupils: Pupils are equal, round, and reactive to light.   HENT:      Head: Normocephalic and atraumatic.   Pulmonary:      Effort: Pulmonary effort is normal. No respiratory distress.      Breath sounds: Normal breath sounds. No wheezing. No rales.   Cardiovascular:      Normal rate. Regular rhythm.      Murmurs: There " is a grade 1/6 systolic murmur.      No gallop. No click. No rub.   Edema:     Peripheral edema present.  Abdominal:      General: Bowel sounds are normal. There is no distension.      Palpations: Abdomen is soft.   Musculoskeletal:         General: No tenderness or deformity.      Cervical back: Neck supple. Skin:     General: Skin is warm and dry.      Findings: No erythema or rash.   Neurological:      Mental Status: Alert and oriented to person, place, and time.   Psychiatric:         Behavior: Behavior normal.         Judgment: Judgment normal.             ECG 12 Lead    Date/Time: 3/2/2022 2:35 PM  Performed by: Joceline Vázquez DNP, APRN  Authorized by: Joceline Vázquez DNP, RIGO   Comparison: compared with previous ECG from 2/23/2022  Rhythm: sinus rhythm  BPM: 50  QRS axis: right  Other findings: low voltage  Comments: No significant changes from previous EKG              Assessment:       Diagnosis Plan   1. Chronic diastolic congestive heart failure (HCC)     2. Essential hypertension           Plan:     1. Acute on chronic diastolic CHF: Patient declined admission last week.  She was given dose of IV Lasix and started on p.o. Lasix 40 mg twice a day.  Saw nephrology Monday and lasix was decreased to once daily due to worsening renal function.  Patient now feels her swelling and shortness of breath are worse.  She is also having fatigue and generalized weakness.  We discussed touching base with nephrology for further recommendations however patient and daughter feel that more acute evaluation is needed.  Discussed plan of care with MD Dr. Hadley Elmore is out of the office today.  ER evaluation recommended for more acute evaluation and possible admission.  2. Hypertension: nephrology recently stopped losartan and increased hydralazine to 100mg BID.  BP looks well controlled in the office today.  3. AS s/p AVR: 6/2013.  Gradients were elevated on 1/2022 echo.  4. Valvular regurgitation:  Mitral regurgitation only trace on 1/2022 echo with mild tricuspid regurgitation  5. Pulmonary hypertension: Improved and mild with RVSP of 37.7 mmHg on 1/2022 echo  6. Hx postoperative atrial fibrillation: in 2013.  No longer on blood thinner without evidence of recurrence.   7. CKD: saw nephrologist, Dr. Art Wick, on Monday.  Losartan was stopped and PO lasix decreased to 40mg once daily and patient has follow-up scheduled for a few weeks from now.     Plan of care discussed with Dr. Bismark MD.     Patient transported from office to the ER via wheelchair with daughter in attendance in stable condition by office staff.  Report given to Lety in the ER.             Your medication list          Accurate as of March 2, 2022  2:34 PM. If you have any questions, ask your nurse or doctor.            CHANGE how you take these medications      Instructions Last Dose Given Next Dose Due   hydrALAZINE 100 MG tablet  Commonly known as: APRESOLINE  What changed:   · medication strength  · how much to take  Changed by: Joceline Vázquez, SHELTON, APRN      Take 1 tablet by mouth 2 (Two) Times a Day.          CONTINUE taking these medications      Instructions Last Dose Given Next Dose Due   aspirin 81 MG tablet      Take by mouth daily.       Claritin 10 MG tablet  Generic drug: loratadine      Take 1 tablet by mouth daily.       ferrous sulfate 325 (65 Fe) MG tablet      Take 1 tablet by mouth Daily.       furosemide 40 MG tablet  Commonly known as: LASIX      Take 1 tablet by mouth Daily.       Glucovance 5-500 MG per tablet  Generic drug: glyBURIDE-metFORMIN      Take 2 tablets by mouth 2 (two) times a day.       insulin glargine 100 UNIT/ML injection  Commonly known as: LANTUS, SEMGLEE      Inject 12 Units under the skin into the appropriate area as directed Daily.       isosorbide mononitrate 120 MG 24 hr tablet  Commonly known as: IMDUR      Take 120 mg by mouth Daily.       linagliptin 5 MG tablet tablet  Commonly  known as: TRADJENTA      Take 5 mg by mouth daily.       LORazepam 0.5 MG tablet  Commonly known as: ATIVAN      Take 1 tablet by mouth as needed.       metoclopramide 10 MG tablet  Commonly known as: Reglan      Take 1 tablet by mouth 3 (Three) Times a Day Before Meals.       metoprolol tartrate 50 MG tablet  Commonly known as: LOPRESSOR      Take 1 tablet by mouth every 12 (twelve) hours.       multivitamin tablet tablet  Commonly known as: THERAGRAN      Take 1 tablet by mouth daily.       Neurontin 600 MG tablet  Generic drug: gabapentin      Take 1 tablet by mouth 2 (two) times a day.       Norvasc 10 MG tablet  Generic drug: amLODIPine      Take 1 tablet by mouth daily.       pantoprazole 40 MG EC tablet  Commonly known as: PROTONIX      Take 40 mg by mouth Daily.       Rapaflo 4 MG capsule capsule  Generic drug: silodosin      Take 4 mg by mouth Daily With Breakfast.       rosuvastatin 20 MG tablet  Commonly known as: CRESTOR      Take 20 mg by mouth Daily.       Singulair 10 MG tablet  Generic drug: montelukast      Take 1 tablet by mouth daily.       Synthroid 25 MCG tablet  Generic drug: levothyroxine      Take 1 tablet by mouth daily.       Tricor 145 MG tablet  Generic drug: fenofibrate      Take 1 tablet by mouth daily.       Vitamin E 400 units tablet      Take 1 tablet by mouth daily.             Where to Get Your Medications      Information about where to get these medications is not yet available    Ask your nurse or doctor about these medications  · hydrALAZINE 100 MG tablet         The above medication changes may not have been made by this provider.  Patient's medication list was updated to reflect medications they are currently taking including medication changes made by other providers.            Thanks,    Joceline Vázquez, SHELTON, APRN  03/02/2022         Dictated utilizing Dragon dictation

## 2022-03-02 NOTE — ED PROVIDER NOTES
EMERGENCY DEPARTMENT ENCOUNTER    Room Number:  E653/1  Date of encounter:  3/3/2022  PCP: Mariah Hickman MD  Historian: Patient and family at bedside      HPI:  Chief Complaint: Shortness of breath, weakness and swelling  A complete HPI/ROS/PMH/PSH/SH/FH are unobtainable due to: Poor historian    Context: Helena Carmen is a 89 y.o. female who presents to the ED c/o generalized weakness, shortness of breath particular with exertion, and swelling all over.  They describe the symptoms as worsening and severe at this point.  The patient feels so swollen and painful all over that she has difficulty walking.    She has had no fever, no chest pain, no nausea vomiting diarrhea, but does have the symptoms as described above.    She has been following with local cardiology as well as Dr. Art Wick in the outpatient setting.  She is known to have congestive heart failure as well as chronic kidney disease.  They are balancing antihypertensives and diuretics trying to find a comfortable fluid balance for her.  Today in the office she was referred to the emergency department by her cardiologist because of decompensation.      PAST MEDICAL HISTORY  Active Ambulatory Problems     Diagnosis Date Noted   • Aortic valve stenosis 04/05/2016   • Diastolic dysfunction 04/05/2016   • Fatigue 04/05/2016   • MI (mitral incompetence) 04/05/2016   • PVC (premature ventricular contraction) 04/05/2016   • Legally blind 05/25/2018   • Essential hypertension 05/25/2018   • Hypertriglyceridemia 05/31/2018   • Pulmonary hypertension (HCC) 06/25/2020   • Paroxysmal atrial fibrillation (HCC) 06/25/2020   • Breast screening 07/08/2014   • Abdominal pain, right lower quadrant 12/13/2020   • Allergic rhinitis 07/10/2014   • Anxiety 07/10/2014   • Chronic kidney disease 03/20/2015   • Chronic pain disorder 01/26/2022   • Degeneration of lumbar intervertebral disc 09/09/2014   • Diabetes mellitus (HCC) 01/26/2022   • Esophageal reflux 07/10/2014    • Gastroparesis 01/26/2022   • Hiatal hernia 01/26/2022   • Iatrogenic hypothyroidism 07/08/2014   • Long term (current) use of opiate analgesic 01/26/2022   • Macular degeneration 01/26/2022   • Malignant neoplasm of uterus (Formerly [facility]) 01/26/2022   • Osteoarthritis of knee 07/10/2014   • Peripheral nerve disease 07/10/2014   • Secondary hyperparathyroidism (Formerly [facility]) 04/05/2016   • Thrombocytopenia (Formerly [facility]) 07/11/2014   • Mitral valve regurgitation 04/05/2016   • History of aortic valve replacement 05/10/2017   • Nonrheumatic tricuspid valve regurgitation    • Elevated serum creatinine    • Chronic diastolic congestive heart failure (Formerly [facility]) 03/02/2022     Resolved Ambulatory Problems     Diagnosis Date Noted   • Hypertensive pulmonary vascular disease (Formerly [facility]) 04/05/2016   • S/P AVR 05/10/2017   • Angina pectoris (Formerly [facility]) 07/10/2014   • Pancreatitis 01/26/2022   • Colitis due to Clostridioides difficile 01/26/2022   • Hypothyroidism 01/26/2022   • Uncontrolled type 2 diabetes mellitus (Formerly [facility]) 07/08/2014   • Benign essential hypertension 07/08/2014   • Hypertension 01/26/2022   • Pulmonary hypertension (Formerly [facility]) 04/05/2016   • Hypercholesterolemia 01/26/2022   • Hyperlipidemia 07/08/2014   • Legal blindness 05/25/2018   • Mitral valve disorder 07/10/2014   • Ventricular premature beats 04/05/2016     Past Medical History:   Diagnosis Date   • Back pain    • Diverticulosis    • Exertional shortness of breath    • Hyperthyroidism    • Left ventricular hypertrophy    • Mitral regurgitation    • Osteoarthritis of hip    • Premature ventricular contractions    • Renal insufficiency syndrome    • Type 2 diabetes mellitus (Formerly [facility])          PAST SURGICAL HISTORY  Past Surgical History:   Procedure Laterality Date   • AORTIC VALVE REPAIR/REPLACEMENT     • CATARACT EXTRACTION      1970, 1999   • ENDOSCOPY  08/15/2014    no gross lesions in stomach/duodenum, erythrematous mucosa in stomach   • HYSTERECTOMY  2007   • STERNOTOMY           FAMILY  HISTORY  Family History   Problem Relation Age of Onset   • Heart disease Mother    • Hypertension Mother    • Stroke Mother    • Diabetes Mother         mellitus   • Other Other         cardiovascular disorder         SOCIAL HISTORY  Social History     Socioeconomic History   • Marital status:    Tobacco Use   • Smoking status: Former Smoker     Packs/day: 0.50     Quit date: 7/10/1969     Years since quittin.6   • Smokeless tobacco: Never Used   Substance and Sexual Activity   • Alcohol use: No     Comment: caffeine use - coffee 2 cups daily   • Drug use: No         ALLERGIES  Erythromycin, Keflex [cephalexin], Penicillins, and Sulfa antibiotics        REVIEW OF SYSTEMS  Review of Systems     All systems reviewed and negative except for those discussed in HPI.       PHYSICAL EXAM    I have reviewed the triage vital signs and nursing notes.    ED Triage Vitals [22 1523]   Temp Heart Rate Resp BP SpO2   97.7 °F (36.5 °C) 51 18 -- 96 %      Temp src Heart Rate Source Patient Position BP Location FiO2 (%)   Tympanic -- -- -- --       Physical Exam  GENERAL: Awake and alert, nontoxic-appearing  HENT: nares patent  EYES: no scleral icterus  CV: regular rhythm, regular rate  RESPIRATORY: Mild increased work of breathing with crackles in the bases  ABDOMEN: soft, protuberant but nontender  MUSCULOSKELETAL: no deformity, 1+ pedal edema in both legs into the thigh  NEURO: alert, moves all extremities, follows commands  SKIN: warm, dry        LAB RESULTS  Recent Results (from the past 24 hour(s))   ECG 12 Lead    Collection Time: 22  3:59 PM   Result Value Ref Range    QT Interval 449 ms   Comprehensive Metabolic Panel    Collection Time: 22  4:32 PM    Specimen: Blood   Result Value Ref Range    Glucose 172 (H) 65 - 99 mg/dL    BUN 63 (H) 8 - 23 mg/dL    Creatinine 2.79 (H) 0.57 - 1.00 mg/dL    Sodium 131 (L) 136 - 145 mmol/L    Potassium 4.8 3.5 - 5.2 mmol/L    Chloride 94 (L) 98 - 107 mmol/L     CO2 21.1 (L) 22.0 - 29.0 mmol/L    Calcium 9.2 8.2 - 9.6 mg/dL    Total Protein 6.8 6.0 - 8.5 g/dL    Albumin 3.80 3.50 - 5.20 g/dL    ALT (SGPT) 20 1 - 33 U/L    AST (SGOT) 27 1 - 32 U/L    Alkaline Phosphatase 31 (L) 39 - 117 U/L    Total Bilirubin 0.3 0.0 - 1.2 mg/dL    Globulin 3.0 gm/dL    A/G Ratio 1.3 g/dL    BUN/Creatinine Ratio 22.6 7.0 - 25.0    Anion Gap 15.9 (H) 5.0 - 15.0 mmol/L    eGFR 15.6 (L) >60.0 mL/min/1.73   Troponin    Collection Time: 03/02/22  4:32 PM    Specimen: Blood   Result Value Ref Range    Troponin T 0.024 0.000 - 0.030 ng/mL   BNP    Collection Time: 03/02/22  4:32 PM    Specimen: Blood   Result Value Ref Range    proBNP 6,928.0 (H) 0.0-1,800.0 pg/mL   CBC Auto Differential    Collection Time: 03/02/22  4:32 PM    Specimen: Blood   Result Value Ref Range    WBC 5.74 3.40 - 10.80 10*3/mm3    RBC 4.00 3.77 - 5.28 10*6/mm3    Hemoglobin 12.2 12.0 - 15.9 g/dL    Hematocrit 38.0 34.0 - 46.6 %    MCV 95.0 79.0 - 97.0 fL    MCH 30.5 26.6 - 33.0 pg    MCHC 32.1 31.5 - 35.7 g/dL    RDW 14.4 12.3 - 15.4 %    RDW-SD 49.4 37.0 - 54.0 fl    MPV 12.9 (H) 6.0 - 12.0 fL    Platelets 39 (C) 140 - 450 10*3/mm3    Neutrophil % 62.6 42.7 - 76.0 %    Lymphocyte % 25.4 19.6 - 45.3 %    Monocyte % 9.4 5.0 - 12.0 %    Eosinophil % 1.9 0.3 - 6.2 %    Basophil % 0.5 0.0 - 1.5 %    Neutrophils, Absolute 3.59 1.70 - 7.00 10*3/mm3    Lymphocytes, Absolute 1.46 0.70 - 3.10 10*3/mm3    Monocytes, Absolute 0.54 0.10 - 0.90 10*3/mm3    Eosinophils, Absolute 0.11 0.00 - 0.40 10*3/mm3    Basophils, Absolute 0.03 0.00 - 0.20 10*3/mm3   Magnesium    Collection Time: 03/02/22  4:32 PM    Specimen: Blood   Result Value Ref Range    Magnesium 1.5 (L) 1.7 - 2.3 mg/dL   Scan Slide    Collection Time: 03/02/22  4:32 PM    Specimen: Blood   Result Value Ref Range    RBC Morphology Normal Normal    WBC Morphology Normal Normal    Platelet Morphology Normal Normal   COVID-19,[facility]HOU[facility] CEPHEID/LATISHA NP SWAB IN TRANSPORT  MEDIA 8-12 HR TAT - Swab, Nasopharynx    Collection Time: 03/02/22  5:37 PM    Specimen: Nasopharynx; Swab   Result Value Ref Range    COVID19 Not Detected Not Detected - Ref. Range   POC Glucose Once    Collection Time: 03/02/22  8:18 PM    Specimen: Blood   Result Value Ref Range    Glucose 80 70 - 130 mg/dL       Ordered the above labs and independently reviewed the results.        RADIOLOGY  XR Chest 1 View    Result Date: 3/2/2022  AP CHEST  HISTORY: Shortness of breath  COMPARISON: 07/29/2013  FINDINGS: There is a small right-sided pleural effusion. There is associated right basilar atelectasis/consolidation. Stable cardiomegaly. No appreciable pneumothorax. Prior sternotomy.      Small right-sided pleural effusion with associated right basilar atelectasis or consolidation  This report was finalized on 3/2/2022 4:51 PM by Dr. Oziel Darling M.D.        I ordered the above noted radiological studies. Reviewed by me and discussed with radiologist.  See dictation for official radiology interpretation.      PROCEDURES    Procedures      MEDICATIONS GIVEN IN ER    Medications   sodium chloride 0.9 % flush 10 mL (has no administration in time range)   furosemide (LASIX) injection 40 mg (has no administration in time range)   insulin lispro (ADMELOG) injection 0-7 Units (has no administration in time range)   insulin glargine (LANTUS, SEMGLEE) injection 12 Units (has no administration in time range)   magnesium sulfate 2g/50 mL (PREMIX) infusion (2 g Intravenous New Bag 3/2/22 1801)   bumetanide (BUMEX) tablet 2 mg (2 mg Oral Given 3/2/22 1809)         PROGRESS, DATA ANALYSIS, CONSULTS, AND MEDICAL DECISION MAKING    All labs have been independently reviewed by me.  All radiology studies have been reviewed by me and discussed with radiologist dictating the report.   EKG's independently viewed and interpreted by me.  Discussion below represents my analysis of pertinent findings related to patient's condition,  differential diagnosis, treatment plan and final disposition.        ED Course as of 03/03/22 0007   Wed Mar 02, 2022   2153 I spoke with Dr. Arnold who agrees to admit the patient.  Going to give her 1 dose of p.o. Bumex and 2 g of IV magnesium. [DP]   Thu Mar 03, 2022   0006 CBC shows a chronic thrombocytopenia that appears to be a little worse than baseline according to the old records in epic [DP]   0007 Chemistry shows some acute on chronic kidney injury with a mild acidosis and potassium of 1.5 [DP]   0007 Troponin negative [DP]   0007 Chest x-ray shows some cardiomegaly and mild congestion [DP]   0007 Patient not hypoxic or in significant distress, but it appears that she is very symptomatic at home and the family is fairly insistent on being evaluated as an inpatient. [DP]   0007 I spoke with Dr. Arnold who agrees to admit the patient to a telemetry bed.  We will consult Dr. Art Wick's group and have given her 1 dose of p.o. Bumex [DP]      ED Course User Index  [DP] Tomás Garnett MD           PPE: The patient wore a surgical mask throughout the entire patient encounter. I wore an N95.    AS OF 00:07 EST VITALS:    BP - 168/62  HR - 62  TEMP - 97.9 °F (36.6 °C) (Oral)  O2 SATS - 92%        DIAGNOSIS  Final diagnoses:   Acute on chronic combined systolic and diastolic congestive heart failure (HCC)   Chronic kidney disease, unspecified CKD stage         DISPOSITION  Admit           Tomás Garnett MD  03/03/22 0008

## 2022-03-02 NOTE — ED NOTES
"Nursing report ED to floor  Helena Carmen  89 y.o.  female    HPI :   Chief Complaint   Patient presents with   • Shortness of Breath   • Weakness - Generalized   • Edema       Admitting doctor:   Mango Arnold MD    Admitting diagnosis:   There were no encounter diagnoses.    Code status:   Current Code Status     Date Active Code Status Order ID Comments User Context       Not on file    Advance Care Planning Activity          Allergies:   Erythromycin, Keflex [cephalexin], Penicillins, and Sulfa antibiotics    Intake and Output  No intake or output data in the 24 hours ending 03/02/22 1823    Weight:       03/02/22  1624   Weight: 81.2 kg (179 lb)       Most recent vitals:   Vitals:    03/02/22 1611 03/02/22 1624 03/02/22 1731 03/02/22 1809   BP: (!) 193/73  (!) 181/76 (!) 186/70   Pulse: 54   50   Resp:       Temp:       TempSrc:       SpO2: 92%      Weight:  81.2 kg (179 lb)     Height:  144.8 cm (57\")         Active LDAs/IV Access:   Lines, Drains & Airways     Active LDAs     Name Placement date Placement time Site Days    Peripheral IV 03/02/22 1631 Left Forearm 03/02/22  1631  Forearm  less than 1                Labs (abnormal labs have a star):   Labs Reviewed   COMPREHENSIVE METABOLIC PANEL - Abnormal; Notable for the following components:       Result Value    Glucose 172 (*)     BUN 63 (*)     Creatinine 2.79 (*)     Sodium 131 (*)     Chloride 94 (*)     CO2 21.1 (*)     Alkaline Phosphatase 31 (*)     Anion Gap 15.9 (*)     eGFR 15.6 (*)     All other components within normal limits    Narrative:     GFR Normal >60  Chronic Kidney Disease <60  Kidney Failure <15     BNP (IN-HOUSE) - Abnormal; Notable for the following components:    proBNP 6,928.0 (*)     All other components within normal limits    Narrative:     Among patients with dyspnea, NT-proBNP is highly sensitive for the detection of acute congestive heart failure. In addition NT-proBNP of <300 pg/ml effectively rules out acute congestive " heart failure with 99% negative predictive value.    Results may be falsely decreased if patient taking Biotin.     CBC WITH AUTO DIFFERENTIAL - Abnormal; Notable for the following components:    MPV 12.9 (*)     Platelets 39 (*)     All other components within normal limits   MAGNESIUM - Abnormal; Notable for the following components:    Magnesium 1.5 (*)     All other components within normal limits   TROPONIN (IN-HOUSE) - Normal    Narrative:     Troponin T Reference Range:  <= 0.03 ng/mL-   Negative for AMI  >0.03 ng/mL-     Abnormal for myocardial necrosis.  Clinicians would have to utilize clinical acumen, EKG, Troponin and serial changes to determine if it is an Acute Myocardial Infarction or myocardial injury due to an underlying chronic condition.       Results may be falsely decreased if patient taking Biotin.     SCAN SLIDE - Normal   COVID PRE-OP / PRE-PROCEDURE SCREENING ORDER (NO ISOLATION)    Narrative:     The following orders were created for panel order COVID PRE-OP / PRE-PROCEDURE SCREENING ORDER (NO ISOLATION) - Swab, Nasopharynx.  Procedure                               Abnormality         Status                     ---------                               -----------         ------                     COVID-19,[facility]HOU[facility]...[[id number]]                      In process                   Please view results for these tests on the individual orders.   COVID-19,[facility]HOU[facility] CEPHEID/LATISHA, NP SWAB IN TRANSPORT MEDIA 8-12 HR TAT   URINALYSIS W/ MICROSCOPIC IF INDICATED (NO CULTURE)   CBC AND DIFFERENTIAL    Narrative:     The following orders were created for panel order CBC & Differential.  Procedure                               Abnormality         Status                     ---------                               -----------         ------                     CBC Auto Differential[[id number]]        Abnormal            Final result               Scan Slide[[id number]]                   Normal               Final result                 Please view results for these tests on the individual orders.       EKG:   ECG 12 Lead   Preliminary Result   HEART RATE= 50  bpm   RR Interval= 1196  ms   [place] Interval= 170  ms   P Horizontal Axis= 36  deg   P Front Axis= 60  deg   QRSD Interval= 109  ms   QT Interval= 449  ms   QRS Axis= 64  deg   T Wave Axis= 58  deg   - OTHERWISE NORMAL ECG -   Sinus rhythm   Low voltage, precordial leads   Electronically Signed By:    Date and Time of Study: 2022 15:59:01          Meds given in ED:   Medications   sodium chloride 0.9 % flush 10 mL (has no administration in time range)   magnesium sulfate 2g/50 mL (PREMIX) infusion (2 g Intravenous New Bag 3/2/22 1801)   bumetanide (BUMEX) tablet 2 mg (2 mg Oral Given 3/2/22 1809)       Imaging results:  XR Chest 1 View    Result Date: 3/2/2022  Small right-sided pleural effusion with associated right basilar atelectasis or consolidation  This report was finalized on 3/2/2022 4:51 PM by Dr. Oziel Darling M.D.        Ambulatory status:   -       Social issues:   Social History     Socioeconomic History   • Marital status:    Tobacco Use   • Smoking status: Former Smoker     Packs/day: 0.50     Quit date: 7/10/1969     Years since quittin.6   • Smokeless tobacco: Never Used   Substance and Sexual Activity   • Alcohol use: No     Comment: caffeine use - coffee 2 cups daily   • Drug use: No              Federica Dallas RN  [id number]

## 2022-03-03 PROBLEM — K74.69 OTHER CIRRHOSIS OF LIVER: Status: ACTIVE | Noted: 2022-03-03

## 2022-03-03 LAB
ALBUMIN SERPL-MCNC: 3.9 G/DL (ref 3.5–5.2)
ANION GAP SERPL CALCULATED.3IONS-SCNC: 13 MMOL/L (ref 5–15)
BUN SERPL-MCNC: 56 MG/DL (ref 8–23)
BUN/CREAT SERPL: 20.9 (ref 7–25)
CALCIUM SPEC-SCNC: 9.4 MG/DL (ref 8.2–9.6)
CHLORIDE SERPL-SCNC: 98 MMOL/L (ref 98–107)
CHROMATIN AB SERPL-ACNC: 10 IU/ML (ref 0–14)
CO2 SERPL-SCNC: 26 MMOL/L (ref 22–29)
CREAT SERPL-MCNC: 2.68 MG/DL (ref 0.57–1)
CREAT UR-MCNC: 26.7 MG/DL
EGFRCR SERPLBLD CKD-EPI 2021: 16.3 ML/MIN/1.73
GLUCOSE BLDC GLUCOMTR-MCNC: 105 MG/DL (ref 70–130)
GLUCOSE BLDC GLUCOMTR-MCNC: 201 MG/DL (ref 70–130)
GLUCOSE BLDC GLUCOMTR-MCNC: 207 MG/DL (ref 70–130)
GLUCOSE BLDC GLUCOMTR-MCNC: 241 MG/DL (ref 70–130)
GLUCOSE SERPL-MCNC: 167 MG/DL (ref 65–99)
LDH SERPL-CCNC: 253 U/L (ref 135–214)
MAGNESIUM SERPL-MCNC: 1.9 MG/DL (ref 1.7–2.3)
PHOSPHATE SERPL-MCNC: 4 MG/DL (ref 2.5–4.5)
PLATELET # BLD AUTO: 38 10*3/MM3 (ref 140–450)
PLATELETS.RETICULATED NFR BLD AUTO: 12.2 % (ref 0.9–6.5)
POTASSIUM SERPL-SCNC: 4.4 MMOL/L (ref 3.5–5.2)
PROT ?TM UR-MCNC: 15.4 MG/DL
QT INTERVAL: 449 MS
SODIUM SERPL-SCNC: 137 MMOL/L (ref 136–145)
SODIUM UR-SCNC: 93 MMOL/L

## 2022-03-03 PROCEDURE — 86235 NUCLEAR ANTIGEN ANTIBODY: CPT | Performed by: INTERNAL MEDICINE

## 2022-03-03 PROCEDURE — 99223 1ST HOSP IP/OBS HIGH 75: CPT | Performed by: INTERNAL MEDICINE

## 2022-03-03 PROCEDURE — 82962 GLUCOSE BLOOD TEST: CPT

## 2022-03-03 PROCEDURE — 83615 LACTATE (LD) (LDH) ENZYME: CPT | Performed by: INTERNAL MEDICINE

## 2022-03-03 PROCEDURE — 84156 ASSAY OF PROTEIN URINE: CPT | Performed by: INTERNAL MEDICINE

## 2022-03-03 PROCEDURE — 86431 RHEUMATOID FACTOR QUANT: CPT | Performed by: INTERNAL MEDICINE

## 2022-03-03 PROCEDURE — 86038 ANTINUCLEAR ANTIBODIES: CPT | Performed by: INTERNAL MEDICINE

## 2022-03-03 PROCEDURE — 80069 RENAL FUNCTION PANEL: CPT | Performed by: INTERNAL MEDICINE

## 2022-03-03 PROCEDURE — 25010000002 FUROSEMIDE PER 20 MG: Performed by: HOSPITALIST

## 2022-03-03 PROCEDURE — 63710000001 INSULIN LISPRO (HUMAN) PER 5 UNITS: Performed by: HOSPITALIST

## 2022-03-03 PROCEDURE — 82570 ASSAY OF URINE CREATININE: CPT | Performed by: INTERNAL MEDICINE

## 2022-03-03 PROCEDURE — 85055 RETICULATED PLATELET ASSAY: CPT | Performed by: INTERNAL MEDICINE

## 2022-03-03 PROCEDURE — 83735 ASSAY OF MAGNESIUM: CPT | Performed by: INTERNAL MEDICINE

## 2022-03-03 PROCEDURE — 84300 ASSAY OF URINE SODIUM: CPT | Performed by: INTERNAL MEDICINE

## 2022-03-03 PROCEDURE — 25010000002 MAGNESIUM SULFATE 2 GM/50ML SOLUTION: Performed by: HOSPITALIST

## 2022-03-03 PROCEDURE — 86225 DNA ANTIBODY NATIVE: CPT | Performed by: INTERNAL MEDICINE

## 2022-03-03 PROCEDURE — 25010000002 FUROSEMIDE PER 20 MG: Performed by: INTERNAL MEDICINE

## 2022-03-03 PROCEDURE — 99232 SBSQ HOSP IP/OBS MODERATE 35: CPT | Performed by: INTERNAL MEDICINE

## 2022-03-03 RX ORDER — AMLODIPINE BESYLATE 10 MG/1
10 TABLET ORAL DAILY
Status: DISCONTINUED | OUTPATIENT
Start: 2022-03-03 | End: 2022-03-09 | Stop reason: HOSPADM

## 2022-03-03 RX ORDER — NICOTINE POLACRILEX 4 MG
15 LOZENGE BUCCAL
Status: DISCONTINUED | OUTPATIENT
Start: 2022-03-03 | End: 2022-03-05

## 2022-03-03 RX ORDER — MAGNESIUM SULFATE HEPTAHYDRATE 40 MG/ML
2 INJECTION, SOLUTION INTRAVENOUS ONCE
Status: DISCONTINUED | OUTPATIENT
Start: 2022-03-03 | End: 2022-03-05

## 2022-03-03 RX ORDER — LORAZEPAM 0.5 MG/1
0.5 TABLET ORAL EVERY 6 HOURS PRN
Status: DISCONTINUED | OUTPATIENT
Start: 2022-03-03 | End: 2022-03-09 | Stop reason: HOSPADM

## 2022-03-03 RX ORDER — DEXTROSE MONOHYDRATE 25 G/50ML
25 INJECTION, SOLUTION INTRAVENOUS
Status: DISCONTINUED | OUTPATIENT
Start: 2022-03-03 | End: 2022-03-05

## 2022-03-03 RX ORDER — LEVOTHYROXINE SODIUM 0.03 MG/1
25 TABLET ORAL DAILY
Status: DISCONTINUED | OUTPATIENT
Start: 2022-03-03 | End: 2022-03-09 | Stop reason: HOSPADM

## 2022-03-03 RX ORDER — BUMETANIDE 2 MG/1
2 TABLET ORAL ONCE
Status: DISCONTINUED | OUTPATIENT
Start: 2022-03-03 | End: 2022-03-03

## 2022-03-03 RX ORDER — FAMOTIDINE 20 MG/1
20 TABLET, FILM COATED ORAL 2 TIMES DAILY
Status: DISCONTINUED | OUTPATIENT
Start: 2022-03-03 | End: 2022-03-08

## 2022-03-03 RX ORDER — FUROSEMIDE 10 MG/ML
20 INJECTION INTRAMUSCULAR; INTRAVENOUS EVERY 12 HOURS
Status: DISCONTINUED | OUTPATIENT
Start: 2022-03-03 | End: 2022-03-03

## 2022-03-03 RX ORDER — GABAPENTIN 300 MG/1
600 CAPSULE ORAL EVERY 12 HOURS SCHEDULED
Status: DISCONTINUED | OUTPATIENT
Start: 2022-03-03 | End: 2022-03-03

## 2022-03-03 RX ORDER — ROSUVASTATIN CALCIUM 20 MG/1
20 TABLET, COATED ORAL NIGHTLY
Status: DISCONTINUED | OUTPATIENT
Start: 2022-03-03 | End: 2022-03-04

## 2022-03-03 RX ORDER — ACETAMINOPHEN 500 MG
1000 TABLET ORAL EVERY 6 HOURS PRN
Status: DISCONTINUED | OUTPATIENT
Start: 2022-03-03 | End: 2022-03-09 | Stop reason: HOSPADM

## 2022-03-03 RX ORDER — ISOSORBIDE MONONITRATE 30 MG/1
30 TABLET, EXTENDED RELEASE ORAL DAILY
Status: DISCONTINUED | OUTPATIENT
Start: 2022-03-03 | End: 2022-03-09 | Stop reason: HOSPADM

## 2022-03-03 RX ORDER — GABAPENTIN 100 MG/1
200 CAPSULE ORAL NIGHTLY
Status: DISCONTINUED | OUTPATIENT
Start: 2022-03-03 | End: 2022-03-09 | Stop reason: HOSPADM

## 2022-03-03 RX ORDER — FUROSEMIDE 10 MG/ML
60 INJECTION INTRAMUSCULAR; INTRAVENOUS EVERY 8 HOURS
Status: DISCONTINUED | OUTPATIENT
Start: 2022-03-03 | End: 2022-03-05

## 2022-03-03 RX ORDER — MONTELUKAST SODIUM 10 MG/1
10 TABLET ORAL DAILY
Status: DISCONTINUED | OUTPATIENT
Start: 2022-03-03 | End: 2022-03-09 | Stop reason: HOSPADM

## 2022-03-03 RX ORDER — METOPROLOL TARTRATE 50 MG/1
50 TABLET, FILM COATED ORAL EVERY 12 HOURS
Status: DISCONTINUED | OUTPATIENT
Start: 2022-03-03 | End: 2022-03-03

## 2022-03-03 RX ORDER — ISOSORBIDE MONONITRATE 60 MG/1
120 TABLET, EXTENDED RELEASE ORAL DAILY
Status: DISCONTINUED | OUTPATIENT
Start: 2022-03-03 | End: 2022-03-03

## 2022-03-03 RX ORDER — INSULIN LISPRO 100 [IU]/ML
0-7 INJECTION, SOLUTION INTRAVENOUS; SUBCUTANEOUS
Status: DISCONTINUED | OUTPATIENT
Start: 2022-03-03 | End: 2022-03-09 | Stop reason: HOSPADM

## 2022-03-03 RX ORDER — TAMSULOSIN HYDROCHLORIDE 0.4 MG/1
0.4 CAPSULE ORAL NIGHTLY
Status: DISCONTINUED | OUTPATIENT
Start: 2022-03-03 | End: 2022-03-09 | Stop reason: HOSPADM

## 2022-03-03 RX ORDER — ASPIRIN 81 MG/1
81 TABLET, CHEWABLE ORAL DAILY
Status: DISCONTINUED | OUTPATIENT
Start: 2022-03-03 | End: 2022-03-03

## 2022-03-03 RX ORDER — ROSUVASTATIN CALCIUM 20 MG/1
20 TABLET, COATED ORAL DAILY
Status: DISCONTINUED | OUTPATIENT
Start: 2022-03-03 | End: 2022-03-03

## 2022-03-03 RX ORDER — VITAMIN E 268 MG
400 CAPSULE ORAL DAILY
Status: DISCONTINUED | OUTPATIENT
Start: 2022-03-03 | End: 2022-03-03

## 2022-03-03 RX ADMIN — LORAZEPAM 0.5 MG: 0.5 TABLET ORAL at 20:58

## 2022-03-03 RX ADMIN — FUROSEMIDE 40 MG: 10 INJECTION, SOLUTION INTRAMUSCULAR; INTRAVENOUS at 00:28

## 2022-03-03 RX ADMIN — ROSUVASTATIN CALCIUM 20 MG: 20 TABLET, FILM COATED ORAL at 20:58

## 2022-03-03 RX ADMIN — Medication 10 ML: at 00:28

## 2022-03-03 RX ADMIN — FAMOTIDINE 20 MG: 20 TABLET ORAL at 12:35

## 2022-03-03 RX ADMIN — METOPROLOL TARTRATE 25 MG: 25 TABLET, FILM COATED ORAL at 12:35

## 2022-03-03 RX ADMIN — ISOSORBIDE MONONITRATE 30 MG: 30 TABLET ORAL at 12:38

## 2022-03-03 RX ADMIN — TAMSULOSIN HYDROCHLORIDE 0.4 MG: 0.4 CAPSULE ORAL at 20:58

## 2022-03-03 RX ADMIN — FUROSEMIDE 60 MG: 10 INJECTION, SOLUTION INTRAMUSCULAR; INTRAVENOUS at 17:56

## 2022-03-03 RX ADMIN — INSULIN GLARGINE-YFGN 12 UNITS: 100 INJECTION, SOLUTION SUBCUTANEOUS at 09:12

## 2022-03-03 RX ADMIN — FAMOTIDINE 20 MG: 20 TABLET ORAL at 20:58

## 2022-03-03 RX ADMIN — AMLODIPINE BESYLATE 10 MG: 10 TABLET ORAL at 12:38

## 2022-03-03 RX ADMIN — ACETAMINOPHEN 1000 MG: 500 TABLET, FILM COATED ORAL at 21:01

## 2022-03-03 RX ADMIN — GABAPENTIN 200 MG: 100 CAPSULE ORAL at 20:58

## 2022-03-03 RX ADMIN — FUROSEMIDE 20 MG: 10 INJECTION, SOLUTION INTRAMUSCULAR; INTRAVENOUS at 10:36

## 2022-03-03 RX ADMIN — MONTELUKAST SODIUM 10 MG: 10 TABLET, FILM COATED ORAL at 12:38

## 2022-03-03 RX ADMIN — LEVOTHYROXINE SODIUM 25 MCG: 0.03 TABLET ORAL at 12:35

## 2022-03-03 RX ADMIN — INSULIN LISPRO 4 UNITS: 100 INJECTION, SOLUTION INTRAVENOUS; SUBCUTANEOUS at 17:55

## 2022-03-03 RX ADMIN — ACETAMINOPHEN 1000 MG: 500 TABLET, FILM COATED ORAL at 10:36

## 2022-03-03 NOTE — PLAN OF CARE
Problem: Adult Inpatient Plan of Care  Goal: Plan of Care Review  Outcome: Ongoing, Progressing  Flowsheets (Taken 3/3/2022 0533)  Progress: improving  Plan of Care Reviewed With: patient  Outcome Summary: pt was admitted this shift for chf, pt did receive lasix with good results, pt complained of no pain this shift, pt appeared to rest well  Goal: Patient-Specific Goal (Individualized)  Outcome: Ongoing, Progressing  Goal: Absence of Hospital-Acquired Illness or Injury  Outcome: Ongoing, Progressing  Intervention: Identify and Manage Fall Risk  Description: Perform standard risk assessment with a validated tool or comprehensive approach appropriate to the patient on admission; reassess fall risk frequently, with change in status or transfer to another level of care.  Communicate fall injury risk to interprofessional healthcare team.  Determine need for increased observation, equipment and environmental modification, such as low bed and signage, as well as supportive, nonskid footwear.  Adjust safety measures to individual developmental age, stage and identified risk factors.  Reinforce the importance of safety and physical activity with patient and family.  Perform regular intentional rounding to assess need for position change, pain assessment, personal needs, including assistance with toileting.  Recent Flowsheet Documentation  Taken 3/3/2022 0456 by Deepika Kellogg, RN  Safety Promotion/Fall Prevention:   activity supervised   assistive device/personal items within reach   clutter free environment maintained   fall prevention program maintained   nonskid shoes/slippers when out of bed   room organization consistent   safety round/check completed  Taken 3/3/2022 0230 by Deepika Kellogg, RN  Safety Promotion/Fall Prevention:   activity supervised   assistive device/personal items within reach   clutter free environment maintained   fall prevention program maintained   nonskid shoes/slippers when out of bed   room  organization consistent   safety round/check completed  Taken 3/3/2022 0044 by Deepika Kellogg RN  Safety Promotion/Fall Prevention:   activity supervised   assistive device/personal items within reach   clutter free environment maintained   fall prevention program maintained   nonskid shoes/slippers when out of bed   room organization consistent   safety round/check completed  Taken 3/2/2022 2230 by Deepika Kellogg RN  Safety Promotion/Fall Prevention:   assistive device/personal items within reach   clutter free environment maintained   fall prevention program maintained   activity supervised   nonskid shoes/slippers when out of bed   room organization consistent   safety round/check completed  Taken 3/2/2022 2004 by Deepika Kellogg RN  Safety Promotion/Fall Prevention:   activity supervised   assistive device/personal items within reach   clutter free environment maintained   fall prevention program maintained   nonskid shoes/slippers when out of bed   room organization consistent   safety round/check completed  Intervention: Prevent and Manage VTE (venous thromboembolism) Risk  Description: Assess for VTE risk.  Encourage/assist with early ambulation.  Initiate and maintain compression or other therapy, as indicated based on identified risk in accordance with organizational protocol/provider order.  Encourage both active and passive leg exercises while in bed, if unable to ambulate.  Recent Flowsheet Documentation  Taken 3/2/2022 2324 by Deepika Kellogg, RN  VTE Prevention/Management: bilateral  Intervention: Prevent Infection  Description: Maintain skin and mucous membrane integrity; promote hand, oral and pulmonary hygiene.  Optimize fluid balance, nutrition, sleep and glycemic control to maximize infection resistance.  Identify potential sources of infection early to prevent or mitigate progression of infection (e.g., wound, lines, devices).  Evaluate ongoing need for invasive devices; remove promptly when no longer  indicated.  Recent Flowsheet Documentation  Taken 3/3/2022 0456 by Deepika Kellogg RN  Infection Prevention:   rest/sleep promoted   single patient room provided   visitors restricted/screened  Taken 3/3/2022 0230 by Deepika Kellogg RN  Infection Prevention:   single patient room provided   rest/sleep promoted   visitors restricted/screened  Taken 3/2/2022 2230 by Deepika Kellogg RN  Infection Prevention:   rest/sleep promoted   single patient room provided   visitors restricted/screened  Taken 3/2/2022 2004 by Deepika Kellogg RN  Infection Prevention:   rest/sleep promoted   single patient room provided   visitors restricted/screened  Goal: Optimal Comfort and Wellbeing  Outcome: Ongoing, Progressing  Intervention: Provide Person-Centered Care  Description: Use a family-focused approach to care.  Develop trust and rapport by proactively providing information, encouraging questions, addressing concerns and offering reassurance.  Acknowledge emotional response to hospitalization.  Recognize and utilize personal coping strategies.  Honor spiritual and cultural preferences.  Recent Flowsheet Documentation  Taken 3/2/2022 2324 by Deepika Kellogg RN  Trust Relationship/Rapport:   care explained   emotional support provided   choices provided   empathic listening provided   questions answered   questions encouraged   reassurance provided   thoughts/feelings acknowledged  Goal: Readiness for Transition of Care  Outcome: Ongoing, Progressing  Intervention: Mutually Develop Transition Plan  Description: Identify available resources for support (e.g., family, friends, community).  Identify and address barriers to ongoing treatment and home management (e.g., environmental, financial).  Provide opportunities to practice self-management skills.  Assess and monitor emotional readiness for transition.  Establish/reconnect linkage with outpatient providers or community-based services.  Recent Flowsheet Documentation  Taken 3/2/2022 1956 by  Deepika Kellogg, RN  Transportation Anticipated: family or friend will provide  Transportation Concerns: car, none  Patient/Family Anticipated Services at Transition: none  Patient/Family Anticipates Transition to: home with family  Taken 3/2/2022 1954 by Deepika Kellogg, RN  Equipment Currently Used at Home:   wheelchair   walker, standard   cane, straight   Goal Outcome Evaluation:  Plan of Care Reviewed With: patient        Progress: improving  Outcome Summary: pt was admitted this shift for chf, pt did receive lasix with good results, pt complained of no pain this shift, pt appeared to rest well

## 2022-03-03 NOTE — H&P
History and physical    Primary care physician  Dr. Hickman    Chief complaint  Shortness of breath  Leg swelling  Weight gain    History of present illness  89-year-old white female with history of diastolic CHF hypertension aortic stenosis s/p AVR pulmonary hypertension atrial fibrillation and chronic kidney disease stage IV presented to [facility] emergency room with increased shortness of breath generalized weakness leg swelling and weight gain.  Patient also have abdominal pain but no nausea vomiting diarrhea.  Patient denies any chest pain fever chills cough.  Patient work-up in ER revealed acute kidney injury and decompression CHF admit for management.  Patient is full code.    PAST MEDICAL HISTOR  • Back pain     • Diverticulosis     • Exertional shortness of breath     • Hyperthyroidism     • Left ventricular hypertrophy     • Mitral regurgitation     • Osteoarthritis of hip     • Premature ventricular contractions     • Renal insufficiency syndrome     • Type 2 diabetes mellitus (HCC)        PAST SURGICAL HISTORY              Procedure Laterality Date   • AORTIC VALVE REPAIR/REPLACEMENT       • CATARACT EXTRACTION         ,    • ENDOSCOPY   08/15/2014     no gross lesions in stomach/duodenum, erythrematous mucosa in stomach   • HYSTERECTOMY      • STERNOTOMY             FAMILY HISTORY           Problem Relation Age of Onset   • Heart disease Mother     • Hypertension Mother     • Stroke Mother     • Diabetes Mother           mellitus   • Other Other           cardiovascular disorder      SOCIAL HISTORY                 Socioeconomic History   • Marital status:    Tobacco Use   • Smoking status: Former Smoker       Packs/day: 0.50       Quit date: 7/10/1969       Years since quittin.6   • Smokeless tobacco: Never Used   Substance and Sexual Activity   • Alcohol use: No       Comment: caffeine use - coffee 2 cups daily   • Drug use: No         ALLERGIES  Erythromycin, Keflex  "[cephalexin], Penicillins, and Sulfa antibiotics  Home medications reviewed     REVIEW OF SYSTEMS  All systems reviewed and negative except for those discussed in HPI.      PHYSICAL EXAM   Blood pressure 163/53, pulse 58, temperature 98.3 °F (36.8 °C), temperature source Oral, resp. rate 18, height 144.8 cm (57\"), weight 85.5 kg (188 lb 7.9 oz), SpO2 97 %.    GENERAL: Awake and alert, nontoxic-appearing  HENT: nares patent  EYES: no scleral icterus  CV: regular rhythm, regular rate  RESPIRATORY: Mild increased work of breathing with crackles in the bases  ABDOMEN: soft, nontender mildly distended bowel sounds positive  MUSCULOSKELETAL: no deformity, 1+ pedal edema in both legs into the thigh  NEURO: alert, moves all extremities, follows commands  SKIN: warm, dry     LAB RESULTS  Lab Results (last 24 hours)     Procedure Component Value Units Date/Time    POC Glucose Once [[id number]]  (Normal) Collected: 03/03/22 0610    Specimen: Blood Updated: 03/03/22 0611     Glucose 105 mg/dL      Comment: Meter: [id number] : [id number] Juan Carlos JOHNSON       POC Glucose Once [[id number]]  (Normal) Collected: 03/02/22 2018    Specimen: Blood Updated: 03/02/22 2019     Glucose 80 mg/dL      Comment: Meter: [id number] : [id number] [employer] ELIZABETH       COVID PRE-OP / PRE-PROCEDURE SCREENING ORDER (NO ISOLATION) - Swab, Nasopharynx [[id number]]  (Normal) Collected: 03/02/22 1737    Specimen: Swab from Nasopharynx Updated: 03/02/22 1825    Narrative:      The following orders were created for panel order COVID PRE-OP / PRE-PROCEDURE SCREENING ORDER (NO ISOLATION) - Swab, Nasopharynx.  Procedure                               Abnormality         Status                     ---------                               -----------         ------                     AALIYAH-ROXY Chris IN-HOUSE...[[id number]]  Normal              Final result                 Please view results for these tests on the individual orders.    AALIYAH-ROXY Chris " IN-HOUSE CEPHEID/LATISHA NP SWAB IN TRANSPORT MEDIA 8-12 HR TAT - Swab, Nasopharynx [[id number]]  (Normal) Collected: 03/02/22 1737    Specimen: Swab from Nasopharynx Updated: 03/02/22 1825     COVID19 Not Detected    Narrative:      Fact sheet for providers: https://www.fda.gov/media/513017/download     Fact sheet for patients: https://www.fda.gov/media/155365/download    Comprehensive Metabolic Panel [[id number]]  (Abnormal) Collected: 03/02/22 1632    Specimen: Blood Updated: 03/02/22 1718     Glucose 172 mg/dL      BUN 63 mg/dL      Creatinine 2.79 mg/dL      Sodium 131 mmol/L      Potassium 4.8 mmol/L      Chloride 94 mmol/L      CO2 21.1 mmol/L      Calcium 9.2 mg/dL      Total Protein 6.8 g/dL      Albumin 3.80 g/dL      ALT (SGPT) 20 U/L      AST (SGOT) 27 U/L      Alkaline Phosphatase 31 U/L      Total Bilirubin 0.3 mg/dL      Globulin 3.0 gm/dL      A/G Ratio 1.3 g/dL      BUN/Creatinine Ratio 22.6     Anion Gap 15.9 mmol/L      eGFR 15.6 mL/min/1.73      Comment: National Kidney Foundation and American Society of Nephrology (ASN) Task Force recommended calculation based on the Chronic Kidney Disease Epidemiology Collaboration (CKD-EPI) equation refit without adjustment for race.       Narrative:      GFR Normal >60  Chronic Kidney Disease <60  Kidney Failure <15      Troponin [[id number]]  (Normal) Collected: 03/02/22 1632    Specimen: Blood Updated: 03/02/22 1717     Troponin T 0.024 ng/mL     Narrative:      Troponin T Reference Range:  <= 0.03 ng/mL-   Negative for AMI  >0.03 ng/mL-     Abnormal for myocardial necrosis.  Clinicians would have to utilize clinical acumen, EKG, Troponin and serial changes to determine if it is an Acute Myocardial Infarction or myocardial injury due to an underlying chronic condition.       Results may be falsely decreased if patient taking Biotin.      Magnesium [[id number]]  (Abnormal) Collected: 03/02/22 1632    Specimen: Blood Updated: 03/02/22 1717     Magnesium 1.5 mg/dL      CBC & Differential [[id number]]  (Abnormal) Collected: 03/02/22 1632    Specimen: Blood Updated: 03/02/22 1707    Narrative:      The following orders were created for panel order CBC & Differential.  Procedure                               Abnormality         Status                     ---------                               -----------         ------                     CBC Auto Differential[[id number]]        Abnormal            Final result               Scan Slide[[id number]]                   Normal              Final result                 Please view results for these tests on the individual orders.    Scan Slide [[id number]]  (Normal) Collected: 03/02/22 1632    Specimen: Blood Updated: 03/02/22 1707     RBC Morphology Normal     WBC Morphology Normal     Platelet Morphology Normal    CBC Auto Differential [[id number]]  (Abnormal) Collected: 03/02/22 1632    Specimen: Blood Updated: 03/02/22 1707     WBC 5.74 10*3/mm3      RBC 4.00 10*6/mm3      Hemoglobin 12.2 g/dL      Hematocrit 38.0 %      MCV 95.0 fL      MCH 30.5 pg      MCHC 32.1 g/dL      RDW 14.4 %      RDW-SD 49.4 fl      MPV 12.9 fL      Platelets 39 10*3/mm3      Neutrophil % 62.6 %      Lymphocyte % 25.4 %      Monocyte % 9.4 %      Eosinophil % 1.9 %      Basophil % 0.5 %      Neutrophils, Absolute 3.59 10*3/mm3      Lymphocytes, Absolute 1.46 10*3/mm3      Monocytes, Absolute 0.54 10*3/mm3      Eosinophils, Absolute 0.11 10*3/mm3      Basophils, Absolute 0.03 10*3/mm3     BNP [[id number]]  (Abnormal) Collected: 03/02/22 1632    Specimen: Blood Updated: 03/02/22 1705     proBNP 6,928.0 pg/mL     Narrative:      Among patients with dyspnea, NT-proBNP is highly sensitive for the detection of acute congestive heart failure. In addition NT-proBNP of <300 pg/ml effectively rules out acute congestive heart failure with 99% negative predictive value.    Results may be falsely decreased if patient taking Biotin.          Imaging Results (Last 24 Hours)      Procedure Component Value Units Date/Time    XR Chest 1 View [[id number]] Collected: 03/02/22 1650     Updated: 03/02/22 1654    Narrative:      AP CHEST     HISTORY: Shortness of breath     COMPARISON: 07/29/2013     FINDINGS: There is a small right-sided pleural effusion. There is  associated right basilar atelectasis/consolidation. Stable cardiomegaly.  No appreciable pneumothorax. Prior sternotomy.       Impression:      Small right-sided pleural effusion with associated right basilar  atelectasis or consolidation     This report was finalized on 3/2/2022 4:51 PM by Dr. Oziel Darling M.D.                ECG 12 Lead  Component   Ref Range & Units 3/2/22 1559   QT Interval   ms 449              HEART RATE= 50  bpm  RR Interval= 1196  ms  MI Interval= 170  ms  P Horizontal Axis= 36  deg  P Front Axis= 60  deg  QRSD Interval= 109  ms  QT Interval= 449  ms  QRS Axis= 64  deg  T Wave Axis= 58  deg  - OTHERWISE NORMAL ECG -  Sinus rhythm  Low voltage, precordial leads  Rate slower             Current Facility-Administered Medications:   •  acetaminophen (TYLENOL) tablet 1,000 mg, 1,000 mg, Oral, Q6H PRN, Mango Arnold MD, 1,000 mg at 03/03/22 1036  •  amLODIPine (NORVASC) tablet 10 mg, 10 mg, Oral, Daily, Mango Arnold MD  •  aspirin chewable tablet 81 mg, 81 mg, Oral, Daily, Mango Arnold MD  •  famotidine (PEPCID) tablet 20 mg, 20 mg, Oral, BID, Mango Arnold MD  •  furosemide (LASIX) injection 60 mg, 60 mg, Intravenous, Q8H, Jordan Vázquez MD  •  gabapentin (NEURONTIN) capsule 200 mg, 200 mg, Oral, Nightly, Jordan áVzquez MD  •  insulin lispro (ADMELOG) injection 0-7 Units, 0-7 Units, Subcutaneous, TID With Meals, Mango Arnold MD  •  isosorbide mononitrate (IMDUR) 24 hr tablet 30 mg, 30 mg, Oral, Daily, Mango Arnold MD  •  levothyroxine (SYNTHROID, LEVOTHROID) tablet 25 mcg, 25 mcg, Oral, Daily, Mango Arnold MD  •  LORazepam (ATIVAN) tablet 0.5 mg, 0.5 mg, Oral, Q6H PRN, Mango Arnold MD  •   magnesium sulfate 2g/50 mL (PREMIX) infusion, 2 g, Intravenous, Once, Kadeem Rivas MD, 2 g at 03/03/22 1035  •  metoprolol tartrate (LOPRESSOR) tablet 25 mg, 25 mg, Oral, Q12H, Kadeem Rivas MD  •  montelukast (SINGULAIR) tablet 10 mg, 10 mg, Oral, Daily, Kadeem Rivas MD  •  rosuvastatin (CRESTOR) tablet 20 mg, 20 mg, Oral, Daily, Kadeem Rivas MD  •  [COMPLETED] Insert peripheral IV, , , Once **AND** sodium chloride 0.9 % flush 10 mL, 10 mL, Intravenous, PRN, Tomás Garnett MD, 10 mL at 03/03/22 0028  •  tamsulosin (FLOMAX) 24 hr capsule 0.4 mg, 0.4 mg, Oral, Nightly, Kadeem Rivas MD  •  vitamin E capsule 400 Units, 400 Units, Oral, Daily, Kadeem Rivas MD     ASSESSMENT  Acute on chronic diastolic CHF  Acute kidney injury  Chronic kidney disease stage IV  Paroxysmal atrial fibrillation  Aortic stenosis s/p AVR  Diabetes mellitus  Hypertension  Hyperlipidemia  Thrombocytopenia  Pulmonary hypertension  Gastroesophageal reflux disease    PLAN  Admit  IV diuresis  Strict I's and O's and daily weight  Nephrology consult  Cardiology to follow patient  Adjust home medications  Stress ulcer DVT prophylaxis  Supportive care  Patient is full code  Discussed with nursing staff  Follow closely and further recommendation according to hospital course    KADEEM RIVAS MD

## 2022-03-03 NOTE — ED NOTES
Pt in mask throughout encounter. This ERT was in appropriate PPE including a N95 mask and proper eyewear. Hand hygiene performed before entering room and after leaving room.        Fei Cerrato  03/02/22 1914

## 2022-03-03 NOTE — PLAN OF CARE
Pt. Slightly hypertensive this a.m., improved with PO meds. Pt. With sinus valentín at times HR in mid 50s, on BB, asymptomatic.  Pt. Vs otherwise wnl.  Pt. With back pain at times, relieved by PO pain meds.  Pt. A&O x4.  Pt. Receiving IV lasix, diuresing well with adequate UOP.  Pt. With  purewick catheter for strict I&Os.  DW.  Pt. Had complete bath today, sat on side of bed.  Pt. Encouraged to get OOB.  Pt. Started on PO steroids.  Pt. Resting comfortably at present, will continue to monitor closely.      Problem: Adult Inpatient Plan of Care  Goal: Plan of Care Review  Outcome: Ongoing, Progressing  Flowsheets (Taken 3/3/2022 1679)  Progress: improving  Plan of Care Reviewed With: patient

## 2022-03-03 NOTE — CONSULTS
.     REASON FOR CONSULTATION:     Provide an opinion on any further workup or treatment of severe thrombocytopenia                             REQUESTING PHYSICIAN: Mango Arnold MD     RECORDS OBTAINED:  Records of the patient's history including those obtained from the referring provider were reviewed and summarized in detail.    HISTORY OF PRESENT ILLNESS:  The patient is a 89 y.o. year old female with atrial fibrillation, aortic stenosis, status post AVR, pulmonary hypertension, chronic renal insufficiency, stage 3/stage 4 and diastolic CHF, type 2 diabetes who presented to the emergency room at [facility] last night because of worsening dyspnea and lower extremity edema and weight gain. Patient had some vague abdominal pain but no nausea, vomiting or diarrhea. No fever, sweating or chills.     Laboratory study at the time of ER visit reported severe thrombocytopenia with platelets of 39,000, hemoglobin 12.2 and WBC 5740 including neutrophils 3590 and lymphocytes 1460. Chemistry lab reported creatinine 2.79 and normal liver function panel. Total protein 6.8 and albumin 3.8. Glucose at 172. Low sodium chloride but normal potassium and calcium. Her proBNP was elevated at 6928.     I reviewed previous medical records and she had CBC earlier this year on 01/26/2022 that reported hemoglobin 12.9, platelets 60,000 and WBC 5860 without differentiation. The only other CBC results in the [facility] system was from 03/22/2019 when she had platelets of 52,000, hemoglobin 11.8 and WBC 4840. Her creatinine was 1.2. Normal liver function panel and glucose 228. Normal electrolytes.     I obtained laboratory study for assessment with IPF 12.2% and platelets 38,000. LDH was elevated at 253 using this morning’s blood sample.     I also reviewed her previous imaging studies and the CT scan from 04/15/2019 reported liver morphology with mildly shrinking appearance and concerning for chronic liver disease. Spleen was  normal size with peripheral foci of calcification. I looked at multiple other CT imaging reports and the CT scan earliest report available was from 06/09/2014 which described cirrhotic morphology of the liver and was compared to that from 08/06/2013. Radiologist commented the cirrhotic morphology was unchanged so dating back at least to 08/2013 she already had evidence of liver cirrhosis by imaging standard.    I also found CBC results from her primary care physician's office dated back on 04/02/2018, Dr. Mariah Hickman, and that reported platelets 67,000, hemoglobin 12.4 and WBC 4500 including neutrophils 2900, lymphocytes 1300. Chemistry at that time reported platelets 67,000, hemoglobin 12.4 and WBC 4500 including neutrophils 2900, lymphocytes 1300. Chemistry at that time reported creatinine 1.44. Normal electrolytes. Glucose of 241. Normal liver function panel. Normal TSH of 1.54. There was no earlier CBC results available.     Recent labs from her nephrologist office reported ferritin 190 ng/mL, free iron 33, TIBC 450 and iron saturation 8%, with hemoglobin 10.9 and platelets 46,000 WBC 5500.  On 2/28/2022    I reviewed her peripheral blood smear today. There is scanty platelets but morphology was normal. There was no schistocytes with very rare target cells. Morphology of WBC was unremarkable. No blasts. No evidence for lymphoma or leukemia.             Past Medical History:   Diagnosis Date   • Aortic valve stenosis     s/p tissue AVR   • Back pain    • Colitis due to Clostridioides difficile 1/26/2022   • Diastolic dysfunction     Grade 2 per echocardiogram 2013   • Diverticulosis    • Exertional shortness of breath    • Hiatal hernia    • Hyperlipidemia    • Hypertension    • Hyperthyroidism    • Hypertriglyceridemia 5/31/2018   • Hypothyroidism    • Left ventricular hypertrophy    • Legally blind    • Macular degeneration    • Mitral regurgitation    • Osteoarthritis of hip    • Pancreatitis 1/26/2022    • Paroxysmal atrial fibrillation (HCC)    • Premature ventricular contractions    • Pulmonary hypertension (HCC)    • Renal insufficiency syndrome    • Type 2 diabetes mellitus (HCC)      Past Surgical History:   Procedure Laterality Date   • AORTIC VALVE REPAIR/REPLACEMENT     • CATARACT EXTRACTION      1970, 1999   • ENDOSCOPY  08/15/2014    no gross lesions in stomach/duodenum, erythrematous mucosa in stomach   • HYSTERECTOMY  2007   • STERNOTOMY         MEDICATIONS    Current Facility-Administered Medications:   •  acetaminophen (TYLENOL) tablet 1,000 mg, 1,000 mg, Oral, Q6H PRN, Mango Arnold MD, 1,000 mg at 03/03/22 1036  •  amLODIPine (NORVASC) tablet 10 mg, 10 mg, Oral, Daily, Mango Arnold MD, 10 mg at 03/03/22 1238  •  famotidine (PEPCID) tablet 20 mg, 20 mg, Oral, BID, Mango Arnold MD, 20 mg at 03/03/22 1235  •  furosemide (LASIX) injection 60 mg, 60 mg, Intravenous, Q8H, Jordan Vázquez MD  •  gabapentin (NEURONTIN) capsule 200 mg, 200 mg, Oral, Nightly, Jordna Vázquez MD  •  insulin lispro (ADMELOG) injection 0-7 Units, 0-7 Units, Subcutaneous, TID With Meals, Mango Arnold MD  •  isosorbide mononitrate (IMDUR) 24 hr tablet 30 mg, 30 mg, Oral, Daily, Mango Arnold MD, 30 mg at 03/03/22 1238  •  levothyroxine (SYNTHROID, LEVOTHROID) tablet 25 mcg, 25 mcg, Oral, Daily, Mango Arnold MD, 25 mcg at 03/03/22 1235  •  LORazepam (ATIVAN) tablet 0.5 mg, 0.5 mg, Oral, Q6H PRN, Mango Arnold MD  •  magnesium sulfate 2g/50 mL (PREMIX) infusion, 2 g, Intravenous, Once, Mango Arnold MD  •  metoprolol tartrate (LOPRESSOR) tablet 25 mg, 25 mg, Oral, Q12H, Mango Arnold MD, 25 mg at 03/03/22 1235  •  montelukast (SINGULAIR) tablet 10 mg, 10 mg, Oral, Daily, Mango Arnold MD, 10 mg at 03/03/22 1238  •  rosuvastatin (CRESTOR) tablet 20 mg, 20 mg, Oral, Nightly, Mango Arnold MD  •  [COMPLETED] Insert peripheral IV, , , Once **AND** sodium chloride 0.9 % flush 10 mL, 10 mL, Intravenous, PRN, Garnett,  Tomás PINEDA MD, 10 mL at 22 0028  •  tamsulosin (FLOMAX) 24 hr capsule 0.4 mg, 0.4 mg, Oral, Nightly, Mango Arnold MD    ALLERGIES:     Allergies   Allergen Reactions   • Erythromycin Unknown (See Comments)     Pt states she does not remember but it was many years ago   • Keflex [Cephalexin] Unknown (See Comments)     Pt states she does not remember reaction but it was many years ago    • Penicillins Rash   • Sulfa Antibiotics Itching and Rash       SOCIAL HISTORY:       Social History     Socioeconomic History   • Marital status:    Tobacco Use   • Smoking status: Former Smoker     Packs/day: 0.50     Quit date: 7/10/1969     Years since quittin.6   • Smokeless tobacco: Never Used   Substance and Sexual Activity   • Alcohol use: No     Comment: caffeine use - coffee 2 cups daily   • Drug use: No         FAMILY HISTORY:  Family History   Problem Relation Age of Onset   • Heart disease Mother    • Hypertension Mother    • Stroke Mother    • Diabetes Mother         mellitus   • Other Other         cardiovascular disorder       REVIEW OF SYSTEMS:  Review of Systems   Constitutional: Positive for activity change, fatigue and unexpected weight change (Weight gains). Negative for chills and fever.   HENT: Negative for nosebleeds and sore throat.    Eyes: Negative for visual disturbance.   Respiratory: Positive for cough and shortness of breath.    Cardiovascular: Positive for leg swelling. Negative for chest pain.   Gastrointestinal: Positive for abdominal pain. Negative for anal bleeding, blood in stool, diarrhea, nausea and vomiting.   Musculoskeletal: Positive for joint swelling (Ankles). Negative for arthralgias.   Skin: Negative for color change and pallor.   Allergic/Immunologic: Positive for immunocompromised state (Diabetic not well controlled).   Neurological: Negative for light-headedness and headaches.   Hematological: Negative for adenopathy. Bruises/bleeds easily.   Psychiatric/Behavioral:  Negative for confusion.              Vitals:    03/02/22 1939 03/02/22 2335 03/03/22 0731 03/03/22 1235   BP: 175/62 168/62 163/53 (!) 190/62   BP Location: Left arm Left arm Left arm Left arm   Patient Position: Lying Lying Lying Lying   Pulse: 52 62 58 60   Resp: 18 18 18    Temp: 98.3 °F (36.8 °C) 97.9 °F (36.6 °C) 98.3 °F (36.8 °C)    TempSrc: Oral Oral Oral    SpO2: 95% 92% 97%    Weight: 85.5 kg (188 lb 7.9 oz)      Height:         No flowsheet data found.   PHYSICAL EXAM:      CONSTITUTIONAL:  Vital signs reviewed.  Well-developed, elderly female lying in bed.  No distress, looks comfortable.  EYES:  Conjunctivae and lids unremarkable.    EARS,NOSE,MOUTH,THROAT:  Ears and nose appear unremarkable.  Lips appear unremarkable.  RESPIRATORY:  Normal respiratory effort.  Lungs clear to auscultation bilaterally.  CARDIOVASCULAR: Irregular rhythm, rate controlled.  Grade 2/6 systolic murmur at the aortic valve area.    GASTROINTESTINAL: Abdomen appears unremarkable.  Nontender.  No hepatomegaly.  No splenomegaly.  LYMPHATIC:  No cervical, supraclavicular, axillary lymphadenopathy.  MUSCULOSKELETAL: Unremarkable digits/nails.  No cyanosis or clubbing.  Bilateral leg edema 1+.  SKIN:  Warm.  No rashes.  A few bruises on forearms.  PSYCHIATRIC:  Normal judgment and insight.  Normal mood and affect.      RECENT LABS:  Results from last 7 days   Lab Units 03/03/22  1531 03/02/22  1632   WBC 10*3/mm3  --  5.74   HEMOGLOBIN g/dL  --  12.2   HEMATOCRIT %  --  38.0   PLATELETS 10*3/mm3 38* 39*     Results from last 7 days   Lab Units 03/03/22  1158 03/02/22  1632 02/25/22  1513   SODIUM mmol/L 137 131* 136   POTASSIUM mmol/L 4.4 4.8 4.5   CHLORIDE mmol/L 98 94* 102   CO2 mmol/L 26.0 21.1* 19.0*   BUN mg/dL 56* 63* 50*   CREATININE mg/dL 2.68* 2.79* 2.44*   CALCIUM mg/dL 9.4 9.2 8.9   BILIRUBIN mg/dL  --  0.3  --    ALK PHOS U/L  --  31*  --    ALT (SGPT) U/L  --  20  --    AST (SGOT) U/L  --  27  --    GLUCOSE mg/dL 167*  172* 146*      Component      Latest Ref Rng & Units 3/3/2022   IPF      0.90 - 6.50 % 12.20 (H)   Platelets      140 - 450 10*3/mm3 38 (LL)   LDH      135 - 214 U/L 253 (H)   Rheumatoid Factor Quantitative      0.0 - 14.0 IU/mL 10.0     I reviewed her peripheral blood smear today on 3/3/2022.        IMAGING:  CT ABDOMEN AND PELVIS WITHOUT CONTRAST 4/15/2019     HISTORY: Generalized abdominal pain. Patient has renal insufficiency.     TECHNIQUE: Axial CT images of the abdomen and pelvis were obtained  without administration of intravenous contrast. The patient was given  oral contrast. Coronal and sagittal reformats were obtained.     COMPARISON: CT from 06/09/2014.     FINDINGS: Changes from prior median sternotomy and aortic valve  replacement are present. Tiny noncalcified nodule within the left lower  lobe is unchanged and would be benign.     Liver demonstrates a mildly shrunken appearance that is concerning for  chronic liver disease. The spleen is normal in size with peripheral foci  of catheterization along the capsule that may represent residual from  prior infarct. The gallbladder is absent. The pancreas is normal without  ductal dilatation. Bilateral adrenal glands are normal. Both kidneys are  normal in size and attenuation. No renal calculi or hydronephrosis. The  urinary bladder is partially distended and normal. There is advanced  sigmoid diverticulosis present. No ascites is seen. No pathological  retroperitoneal lymphadenopathy. Moderate to advanced calcified  atherosclerotic plaque is seen within the abdominal aorta and its  branches. There is midline rectus diastasis with small wide neck  fat-containing herniae seen in the supraumbilical region. Trace sliding  hiatal hernia is seen. Diffuse gastric fold thickening likely related to  underdistention, however, gastritis is a possible consideration.  Degenerative disc disease is seen in the spine with vacuum disc  phenomena at L3-4 level.      IMPRESSION:  1. There is no acute abnormality within the abdomen and pelvis.  2. Liver morphology concerning for chronic liver disease.  3. Extensive sigmoid diverticulosis. No CT evidence of diverticulitis.  4. Diffuse gastric fold thickening likely related to underdistention,  however, gastritis is a possibility.        Assessment/Plan     ASSESSMENT:   1. Congestive heart failure, managed by primary team.   2. 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 Patient is followed by Nephrology service.   3. Severe thrombocytopenia with platelets today 30,000 and IPF 38,000 and platelets 12.2%. Also elevated LDH at 252. She has no evidence of hemolysis under the microscope and she is not anemic.     I found out on previous CT scan, multiple CT scans reported in 2014 and 2019 and evidence of 2013 reported that the patient has liver cirrhosis by imaging standards. Her daughter seems to not be aware of that completely. I discussed with the patient and her daughter that liver cirrhosis itself could cause thrombocytopenia despite she did not have evidence of splenomegaly based on a previous CT scan examination. Nevertheless, liver produces thrombopoietin and theoretically that would be low if having liver cirrhosis. She did have borderline low platelets in the 50,000 to 60,000 range in the past couple of years since 2018. On 02/25/2022 this patient had platelets of 46,000 from her kidney doctor’s office. 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 I discussed with the patient and her daughter recommended low-dose prednisone 30 mg daily. I discussed with them the potential side effects that definitely would cause hyperglycemia and she is already a diabetic not that well controlled and also can cause agitation and insomnia as well as side effects to the stomach to the extreme that cause bleeding. Patient is already on Pepcid twice a day which is  good to continue. We will evaluate whether the patient is responding to the oral steroid treatment. If her platelets could improve to their baseline level I would be happy and certainly we are not looking to improve platelets to normal level.     PLAN:    1. Start prednisone 30 mg daily.   2. Continue Pepcid twice a day for GI prophylaxis.   3. Continue management for diabetes and CHF per primary team.   4. I will also obtain laboratory study for vitamin B12 and serum protein immunofixation, free light chains, quantification of immunoglobulin tomorrow morning.     Patient's daughter helped her with history and discussion. More than 80 min were used for patient care, over half of that time were for counseling.       Thank you for letting us participate in the care of this patient.      DAKOTA BECKHAM M.D., Ph.D.    3/3/2022

## 2022-03-03 NOTE — CONSULTS
Requested to see patient to provide support.  Patient was not awake or responsive, so I had prayer in room for patient.

## 2022-03-03 NOTE — CASE MANAGEMENT/SOCIAL WORK
Continued Stay Note  [facility]     Patient Name: Helena Carmen  MRN: [id number]  Today's Date: 3/3/2022    Admit Date: 3/2/2022     Discharge Plan     Row Name 03/03/22 0812       Plan    Plan Plan home with [facility] if accepted.   RITA Ashford RN    Patient/Family in Agreement with Plan yes    Plan Comments FACE SHEET VERIFIED/ IM LETTER SIGNED.  Spoke with pt and pt's daughter ( Radha Hoyos  [phone]) at bedside.  Pt's PCP is Dr. Jake Hickman.   Pt lives alone in a single story house.  Pt's family assists her as needed.   Pt's children are her daughters  ( Radha Hoyos  [phone] and Margaret Loco [phone]) and son  ( Brandon Carmen)that assist her.  Pt is independent with ADLs.  Pt has a cane, glucometer, grab bars, rollator, shower chair, and transport chair for home use. Pt [facility] her prescriptions at [facility], and [facility].  Daughter denies any issues affording medications. Pt is not current with HH.  Pt has been in [place] for skilled care.  Pt and family would like  to follow pt at discharged.  Referral to [facility] HH called to [place] ( [phone]).   Plan home with [facility] if accepted.   RITA Ashford RN               Discharge Codes    No documentation.                     Radha Ashford, RN

## 2022-03-03 NOTE — PROGRESS NOTES
Hospital Follow Up    LOS:  LOS: 1 day   Patient Name: Helena Carmen  Age/Sex: 89 y.o. female  : 1931  MRN: [id number]    Day of Service: 22   Length of Stay: 1  Encounter Provider: Jevon Palacios MD  Place of Service: [facility] CARDIOLOGY  Patient Care Team:  Mariah Hickman MD as PCP - General (Family Medicine)  Beth Butcher MD as Consulting Physician (Cardiology)    Subjective:     Chief Complaint: Shortness of breath    Interval History: Patient does say she is a little bit better.  Patient has been concerned because she feels like Dr. Wick is saying it cardiac Dr. Butcher stating its renal.  At this point she is improving her blood pressure is markedly elevated.    Objective:     Objective:  Temp:  [97.7 °F (36.5 °C)-98.3 °F (36.8 °C)] 98.3 °F (36.8 °C)  Heart Rate:  [49-62] 60  Resp:  [18] 18  BP: (163-193)/(53-76) 190/62     Intake/Output Summary (Last 24 hours) at 3/3/2022 1436  Last data filed at 3/3/2022 0534  Gross per 24 hour   Intake --   Output 1500 ml   Net -1500 ml     Body mass index is 40.79 kg/m².      22  1624 22  1939   Weight: 81.2 kg (179 lb) 85.5 kg (188 lb 7.9 oz)     Weight change:       Physical Exam:   General : Alert, cooperative, in no acute distress.  Neuro: Alert,cooperative and oriented.  Lungs: CTAB. Normal respiratory effort and rate.  CV: Regular rate and rhythm, normal S1 and S2, no murmurs, gallops or rubs.  ABD: Soft, nontender, nondistended. Positive bowel sounds.  Extr: 1+ edema or cyanosis, moves all extremities.    Lab Review:   Results from last 7 days   Lab Units 22  1158 22  1632   SODIUM mmol/L 137 131*   POTASSIUM mmol/L 4.4 4.8   CHLORIDE mmol/L 98 94*   CO2 mmol/L 26.0 21.1*   BUN mg/dL 56* 63*   CREATININE mg/dL 2.68* 2.79*   GLUCOSE mg/dL 167* 172*   CALCIUM mg/dL 9.4 9.2   AST (SGOT) U/L  --  27   ALT (SGPT) U/L  --  20     Results from last 7 days   Lab Units  03/02/22  1632   TROPONIN T ng/mL 0.024     Results from last 7 days   Lab Units 03/02/22  1632   WBC 10*3/mm3 5.74   HEMOGLOBIN g/dL 12.2   HEMATOCRIT % 38.0   PLATELETS 10*3/mm3 39*         Results from last 7 days   Lab Units 03/03/22  1158 03/02/22  1632   MAGNESIUM mg/dL 1.9 1.5*           Invalid input(s): LDLCALC  Results from last 7 days   Lab Units 03/02/22  1632   PROBNP pg/mL 6,928.0*           Current Medications:   Scheduled Meds:amLODIPine, 10 mg, Oral, Daily  famotidine, 20 mg, Oral, BID  furosemide, 60 mg, Intravenous, Q8H  gabapentin, 200 mg, Oral, Nightly  insulin lispro, 0-7 Units, Subcutaneous, TID With Meals  isosorbide mononitrate, 30 mg, Oral, Daily  levothyroxine, 25 mcg, Oral, Daily  magnesium sulfate, 2 g, Intravenous, Once  metoprolol tartrate, 25 mg, Oral, Q12H  montelukast, 10 mg, Oral, Daily  rosuvastatin, 20 mg, Oral, Nightly  tamsulosin, 0.4 mg, Oral, Nightly      Continuous Infusions:     Allergies:  Allergies   Allergen Reactions   • Erythromycin Unknown (See Comments)     Pt states she does not remember but it was many years ago   • Keflex [Cephalexin] Unknown (See Comments)     Pt states she does not remember reaction but it was many years ago    • Penicillins Rash   • Sulfa Antibiotics Itching and Rash       Assessment:       CHF (congestive heart failure) ([facility])        Plan:   1.  Acute on chronic diastolic heart failure.  2.  Hypertension  3.  Status post aortic valve replacement in June 2013.  4.  Pulmonary hypertension echo on 1/26/2022 pressure was only 37.7.  Patient was mention that her pressures are large could be the etiology however nursing her pressure this low be a concern.  5.  At this point would continue IV diuretics and try to be more aggressive with her blood pressure.  Patient was on hydralazine but currently not.  Defer to renal about initiation again.        Jevon Palacios MD  03/03/22  14:36 EST

## 2022-03-03 NOTE — CASE MANAGEMENT/SOCIAL WORK
Discharge Planning Assessment  [facility]     Patient Name: Helena Carmen  MRN: [id number]  Today's Date: 3/3/2022    Admit Date: 3/2/2022     Discharge Needs Assessment     Row Name 03/03/22 0809       Living Environment    Lives With alone    Current Living Arrangements home/apartment/condo    Primary Care Provided by self    Provides Primary Care For no one    Family Caregiver if Needed child(edison), adult    Family Caregiver Names Daughters  ( Radha Hoyos  [phone] and Margaret Loco [phone]) and son  ( Brandon Carmen)    Quality of Family Relationships helpful; involved; supportive    Able to Return to Prior Arrangements yes    Living Arrangement Comments Pt lives in a single story house.       Resource/Environmental Concerns    Resource/Environmental Concerns none    Transportation Concerns car, none       Transition Planning    Patient/Family Anticipates Transition to home with family    Patient/Family Anticipated Services at Transition home health care    Transportation Anticipated family or friend will provide       Discharge Needs Assessment    Readmission Within the Last 30 Days no previous admission in last 30 days    Equipment Currently Used at Home cane, straight; glucometer; grab bar; rollator; shower chair; wheelchair    Concerns to be Addressed no discharge needs identified; denies needs/concerns at this time    Anticipated Changes Related to Illness none    Equipment Needed After Discharge cane, straight; grab bar, tub/shower; glucometer; rollator; shower chair; wheelchair, power               Discharge Plan     Row Name 03/03/22 0812       Plan    Plan Plan home with [facility] if accepted.   RITA Ashford RN    Patient/Family in Agreement with Plan yes    Plan Comments FACE SHEET VERIFIED/ IM LETTER SIGNED.  Spoke with pt and pt's daughter ( Radha Hoyos  [phone]) at bedside.  Pt's PCP is Dr. Jake Hickman.   Pt lives alone in a single story house.  Pt's family assists her as needed.    Pt's children are her daughters  ( Radha Hoyos  [phone] and Margaret Loco [phone]) and son  ( Brandon Carmen)that assist her.  Pt is independent with ADLs.  Pt has a cane, glucometer, grab bars, rollator, shower chair, and transport chair for home use. Pt gets her prescriptions at [facility]  ([facility]), [facility], and [facility].  Daughter denies any issues affording medications. Pt is not current with .  Pt has been in [place] for skilled care.  Pt and family would like  to follow pt at discharged.  Referral to [facility] called to [place] ( [phone]).   Plan home with [facility] if accepted.   RITA Ashford RN              Continued Care and Services - Admitted Since 3/2/2022     Home Medical Care     Service Provider Request Status Selected Services Address Phone Fax Patient Preferred     [facility]  Pending - Request Sent N/A [address] [address] [phone] --                 Demographic Summary     Row Name 03/03/22 0807       General Information    Admission Type inpatient    Arrived From emergency department    Required Notices Provided Important Message from Medicare    Referral Source admission list    Reason for Consult discharge planning    Preferred Language English     Used During This Interaction no               Functional Status     Row Name 03/03/22 0808       Functional Status    Usual Activity Tolerance moderate    Current Activity Tolerance fair       Functional Status, IADL    Medications independent    Meal Preparation assistive person    Housekeeping assistive person    Laundry assistive person    Shopping assistive person       Mental Status    General Appearance WDL WDL               Psychosocial    No documentation.                Abuse/Neglect    No documentation.                Legal    No documentation.                Substance Abuse    No documentation.                Patient Forms    No  documentation.                   Radha Ashford, RN

## 2022-03-03 NOTE — CONSULTS
[facility] Consult Note      Patient Name: Helena Carmen  : 1931  MRN: [id number]  Primary Care Physician:  Mariah Hickman MD  Referring Physician: Mango Arnold MD  Date of admission: 3/2/2022    Subjective     Reason for Consult:  NADER on CKD3-4    HPI:   Helena Carmen is a 89 y.o. 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 Art Wick, admitted 3/2 for further evaluation of weakness.  She had been seen at the cardiologist office that same day, and was directed to the ER further evaluation of decompensated CHF.  SCR on arrival yesterday 2.8, with labs this morning still pending.  PMH outlined below; pertinent is AVR for severe AS, moderate MR and TR, severely dilated left atrium, hypertension, and DM2.  Reports that weakness has developed over the past 2 weeks, coinciding with reduction in diuretic.    · She feels very bloated, with leg swelling and abdominal distention  · Denies any orthopnea, and in fact states that breathing is more comfortable when she is flat  · Progressive weakness with ALEMAN; no chest pain  · No fever or chills  · No urinary complaints; bowel movements fine    Review of Systems:   14 point review of systems is otherwise negative except for mentioned above on HPI    Personal History     Past Medical History:   Diagnosis Date   • Aortic valve stenosis     s/p tissue AVR   • Back pain    • Colitis due to Clostridioides difficile 2022   • Diastolic dysfunction     Grade 2 per echocardiogram    • Diverticulosis    • Exertional shortness of breath    • Hiatal hernia    • Hyperlipidemia    • Hypertension    • Hyperthyroidism    • Hypertriglyceridemia 2018   • Hypothyroidism    • Left ventricular hypertrophy    • Legally blind    • Macular degeneration    • Mitral regurgitation    • Osteoarthritis of hip    • Pancreatitis 2022   • Paroxysmal atrial fibrillation (HCC)    • Premature ventricular contractions     • Pulmonary hypertension (HCC)    • Renal insufficiency syndrome    • Type 2 diabetes mellitus (HCC)        Past Surgical History:   Procedure Laterality Date   • AORTIC VALVE REPAIR/REPLACEMENT     • CATARACT EXTRACTION      1970, 1999   • ENDOSCOPY  08/15/2014    no gross lesions in stomach/duodenum, erythrematous mucosa in stomach   • HYSTERECTOMY  2007   • STERNOTOMY         Family History: family history includes Diabetes in her mother; Heart disease in her mother; Hypertension in her mother; Other in an other family member; Stroke in her mother.    Social History:  reports that she quit smoking about 52 years ago. She smoked 0.50 packs per day. She has never used smokeless tobacco. She reports that she does not drink alcohol and does not use drugs.    Home Medications:  Prior to Admission medications    Medication Sig Start Date End Date Taking? Authorizing Provider   amLODIPine (NORVASC) 10 MG tablet Take 1 tablet by mouth daily. 9/19/13  Yes Luke Grove MD   aspirin 81 MG tablet Take by mouth daily. 7/12/13  Yes Luke Grove MD   fenofibrate (TRICOR) 145 MG tablet Take 1 tablet by mouth daily. 3/8/13  Yes Luke Grove MD   Ferrous Sulfate (IRON) 325 (65 FE) MG tablet Take 1 tablet by mouth Daily. 9/19/13  Yes Luke Grove MD   furosemide (LASIX) 40 MG tablet Take 1 tablet by mouth Daily. 2/23/22  Yes Joceline Vázquez DNP, APRN   gabapentin (NEURONTIN) 600 MG tablet Take 1 tablet by mouth 2 (two) times a day. 3/8/13  Yes Luke Grove MD   glyBURIDE-metFORMIN (GLUCOVANCE) 5-500 MG per tablet Take 2 tablets by mouth 2 (two) times a day. 3/8/13  Yes Luke Grove MD   hydrALAZINE (APRESOLINE) 100 MG tablet Take 1 tablet by mouth 2 (Two) Times a Day. 3/2/22  Yes Joceline Vázquez DNP, APRN   insulin glargine (LANTUS) 100 UNIT/ML injection Inject 12 Units under the skin into the appropriate area as directed Daily.   Yes Luke Grove MD    isosorbide mononitrate (IMDUR) 120 MG 24 hr tablet Take 120 mg by mouth Daily.   Yes ProviderLuke MD   levothyroxine (SYNTHROID) 25 MCG tablet Take 1 tablet by mouth daily. 3/8/13  Yes Luke Grove MD   linagliptin (TRADJENTA) 5 MG tablet tablet Take 5 mg by mouth daily.   Yes Luke Grove MD   loratadine (CLARITIN) 10 MG tablet Take 1 tablet by mouth daily. 5/12/14  Yes Luke Grove MD   LORazepam (ATIVAN) 0.5 MG tablet Take 1 tablet by mouth as needed. 9/19/13  Yes Luke Grove MD   metoclopramide (REGLAN) 10 MG tablet Take 1 tablet by mouth 3 (Three) Times a Day Before Meals. 5/6/19  Yes Siri Carter APRN   metoprolol tartrate (LOPRESSOR) 50 MG tablet Take 1 tablet by mouth every 12 (twelve) hours. 7/12/13  Yes Beth Butcher MD   montelukast (SINGULAIR) 10 MG tablet Take 1 tablet by mouth daily. 7/12/13  Yes ProviderLuke MD   Multiple Vitamin tablet Take 1 tablet by mouth daily. 7/15/13  Yes Luke Grove MD   pantoprazole (PROTONIX) 40 MG EC tablet Take 40 mg by mouth Daily.   Yes Provider, MD Luke   rosuvastatin (CRESTOR) 20 MG tablet Take 20 mg by mouth Daily.   Yes Provider, MD Luke   silodosin (Rapaflo) 4 MG capsule capsule Take 4 mg by mouth Daily With Breakfast. 9/19/13  Yes ProviderLuke MD   Vitamin E 400 UNITS tablet Take 1 tablet by mouth daily. 3/8/13  Yes Provider, MD Luke       Allergies:  Allergies   Allergen Reactions   • Erythromycin Unknown (See Comments)     Pt states she does not remember but it was many years ago   • Keflex [Cephalexin] Unknown (See Comments)     Pt states she does not remember reaction but it was many years ago    • Penicillins Rash   • Sulfa Antibiotics Itching and Rash       Objective     Vitals:   Temp:  [97.7 °F (36.5 °C)-98.3 °F (36.8 °C)] 98.3 °F (36.8 °C)  Heart Rate:  [49-62] 58  Resp:  [18] 18  BP: (130-193)/(53-76) 163/53    Intake/Output Summary (Last 24  hours) at 3/3/2022 1042  Last data filed at 3/3/2022 0534  Gross per 24 hour   Intake --   Output 1500 ml   Net -1500 ml       Physical Exam:   Constitutional: Awake, NAD, oriented fully; looks younger than stated age  HEENT: Sclera anicteric, no conjunctival injection  Neck: Supple, trachea at midline, bilateral carotid bruits  Respiratory: Mostly clear, nonlabored respiration on 2 L/min  Cardiovascular: RRR, 2/6M, no rub  Gastrointestinal: BS +, soft, nontender, + distended  : No palpable bladder; external urinary catheter  Musculoskeletal: +1-2 edema extending to hips, no clubbing or cyanosis  Psychiatric: Flat affect; cooperative; fully oriented  Neurologic: moving all extremities, normal speech and mental status  Skin: Warm and dry       Scheduled Meds:     amLODIPine, 10 mg, Oral, Daily  aspirin, 81 mg, Oral, Daily  famotidine, 20 mg, Oral, BID  furosemide, 20 mg, Intravenous, Q12H  gabapentin, 600 mg, Oral, Q12H  insulin lispro, 0-7 Units, Subcutaneous, TID With Meals  isosorbide mononitrate, 30 mg, Oral, Daily  levothyroxine, 25 mcg, Oral, Daily  magnesium sulfate, 2 g, Intravenous, Once  metoprolol tartrate, 25 mg, Oral, Q12H  montelukast, 10 mg, Oral, Daily  rosuvastatin, 20 mg, Oral, Daily  tamsulosin, 0.4 mg, Oral, Nightly  vitamin E, 400 Units, Oral, Daily      IV Meds:        Results Reviewed:   I have personally reviewed the results from the time of this admission to 3/3/2022 10:42 EST     Lab Results   Component Value Date    GLUCOSE 172 (H) 03/02/2022    CALCIUM 9.2 03/02/2022     (L) 03/02/2022    K 4.8 03/02/2022    CO2 21.1 (L) 03/02/2022    CL 94 (L) 03/02/2022    BUN 63 (H) 03/02/2022    CREATININE 2.79 (H) 03/02/2022    EGFRIFAFRI 51 (L) 03/22/2019    EGFRIFNONA 19 (L) 02/25/2022    BCR 22.6 03/02/2022    ANIONGAP 15.9 (H) 03/02/2022      Lab Results   Component Value Date    MG 1.5 (L) 03/02/2022    ALBUMIN 3.80 03/02/2022           Assessment / Plan     ASSESSMENT:  1.  NADER on CKD  3-4, nonoliguric, likely prerenal due to cardiorenal syndrome.  Volume excess, but peripheral more than central; low magnesium yesterday; hypervolemic hyponatremia.  No urine studies yet.  Large dose of gabapentin, likely contributing to fluid retention  2.  CHF, decompensated; seems more right-sided  3.  Hypertension, exacerbated by hypervolemia  4.  Multi-valvular heart disease with prior AVR  5.  DM2    PLAN:  1.  Will order labs for this morning  2.  FENa and serum uric acid  3.  Does she need a right heart cath?  4.  Continue IV furosemide, but increase dose to 60 mg IV q8h  5.  Bladder scan for completeness' sake  6.  Reduce gabapentin given NADER and current state of fluid retention  7.  Discussed with daughter at bedside    Thank you for involving us in the care of Helena Carmen.  Please feel free to call with any questions.    Jordan Vázquez MD  03/03/22  10:42 EST    [facility]  [phone]      Much of this encounter note is an electronic transcription/translation of spoken language to printed text. The electronic translation of spoken language may permit erroneous, or at times, nonsensical words or phrases to be inadvertently transcribed; Although I have reviewed the note for such errors, some may still exist.

## 2022-03-03 NOTE — DISCHARGE PLACEMENT REQUEST
"Helena Gruber (89 y.o. Female)             Date of Birth Social Security Number Address Home Phone MRN    02/20/1931  [address] [address] [phone] [id number]    [employer]             [employer]        Admission Date Admission Type Admitting Provider Attending Provider Department, Room/Bed    3/2/22 Emergency Mango Arnold MD Ahmed, Aftab, MD [address], [address]    Discharge Date Discharge Disposition Discharge Destination                         Attending Provider: Mango Arnold MD    Allergies: Erythromycin, Keflex [Cephalexin], Penicillins, Sulfa Antibiotics    Isolation: None   Infection: None   Code Status: Not on file   Advance Care Planning Activity    Ht: 144.8 cm (57\")   Wt: 85.5 kg (188 lb 7.9 oz)    Admission Cmt: None   Principal Problem: None                Active Insurance as of 3/2/2022     Primary Coverage     Payor Plan Insurance Group Employer/Plan Group    MEDICARE MEDICARE A & B      Payor Plan Address Payor Plan Phone Number Payor Plan Fax Number Effective Dates    [address] [phone]  2/1/1996 - None Entered    [facility] [id number]       Subscriber Name Subscriber Birth Date Member ID       HELENA GRUBER 2/20/1931 [id number]           Secondary Coverage     Payor Plan Insurance Group Employer/Plan Group     FOR LIFE  FOR LIFE  SUP       Payor Plan Address Payor Plan Phone Number Payor Plan Fax Number Effective Dates    [address] [phone]  4/5/2016 - None Entered    [facility] [address]       Subscriber Name Subscriber Birth Date Member ID       HELENA GRUBER 2/20/1931 [id number]                 Emergency Contacts      (Rel.) Home Phone Work Phone Mobile Phone    SohaRadha (Daughter) [phone] -- [phone]    Margaret Loco (Daughter) [phone] -- [phone]              "

## 2022-03-04 LAB
ALBUMIN SERPL-MCNC: 4 G/DL (ref 3.5–5.2)
ALBUMIN/GLOB SERPL: 1.3 G/DL
ALP SERPL-CCNC: 32 U/L (ref 39–117)
ALT SERPL W P-5'-P-CCNC: 16 U/L (ref 1–33)
ANION GAP SERPL CALCULATED.3IONS-SCNC: 14 MMOL/L (ref 5–15)
AST SERPL-CCNC: 21 U/L (ref 1–32)
BASOPHILS # BLD AUTO: 0.03 10*3/MM3 (ref 0–0.2)
BASOPHILS NFR BLD AUTO: 0.4 % (ref 0–1.5)
BILIRUB SERPL-MCNC: 0.7 MG/DL (ref 0–1.2)
BUN SERPL-MCNC: 62 MG/DL (ref 8–23)
BUN/CREAT SERPL: 23 (ref 7–25)
CALCIUM SPEC-SCNC: 9.9 MG/DL (ref 8.2–9.6)
CENTROMERE B AB SER-ACNC: <0.2 AI (ref 0–0.9)
CHLORIDE SERPL-SCNC: 96 MMOL/L (ref 98–107)
CHOLEST SERPL-MCNC: 114 MG/DL (ref 0–200)
CHROMATIN AB SERPL-ACNC: <0.2 AI (ref 0–0.9)
CO2 SERPL-SCNC: 29 MMOL/L (ref 22–29)
CREAT SERPL-MCNC: 2.7 MG/DL (ref 0.57–1)
DEPRECATED RDW RBC AUTO: 49.7 FL (ref 37–54)
DSDNA AB SER-ACNC: 1 IU/ML (ref 0–9)
EGFRCR SERPLBLD CKD-EPI 2021: 16.2 ML/MIN/1.73
ENA JO1 AB SER-ACNC: <0.2 AI (ref 0–0.9)
ENA RNP AB SER-ACNC: 0.3 AI (ref 0–0.9)
ENA SCL70 AB SER-ACNC: <0.2 AI (ref 0–0.9)
ENA SM AB SER-ACNC: <0.2 AI (ref 0–0.9)
ENA SS-A AB SER-ACNC: <0.2 AI (ref 0–0.9)
ENA SS-B AB SER-ACNC: <0.2 AI (ref 0–0.9)
EOSINOPHIL # BLD AUTO: 0.1 10*3/MM3 (ref 0–0.4)
EOSINOPHIL NFR BLD AUTO: 1.4 % (ref 0.3–6.2)
ERYTHROCYTE [DISTWIDTH] IN BLOOD BY AUTOMATED COUNT: 14.3 % (ref 12.3–15.4)
FOLATE SERPL-MCNC: >20 NG/ML (ref 4.78–24.2)
GLOBULIN UR ELPH-MCNC: 3.2 GM/DL
GLUCOSE BLDC GLUCOMTR-MCNC: 166 MG/DL (ref 70–130)
GLUCOSE BLDC GLUCOMTR-MCNC: 264 MG/DL (ref 70–130)
GLUCOSE SERPL-MCNC: 159 MG/DL (ref 65–99)
HBA1C MFR BLD: 6.7 % (ref 4.8–5.6)
HCT VFR BLD AUTO: 38.1 % (ref 34–46.6)
HDLC SERPL-MCNC: 39 MG/DL (ref 40–60)
HGB BLD-MCNC: 12 G/DL (ref 12–15.9)
LDH SERPL-CCNC: 238 U/L (ref 135–214)
LDLC SERPL CALC-MCNC: 50 MG/DL (ref 0–100)
LDLC/HDLC SERPL: 1.16 {RATIO}
LYMPHOCYTES # BLD AUTO: 1.35 10*3/MM3 (ref 0.7–3.1)
LYMPHOCYTES NFR BLD AUTO: 19.5 % (ref 19.6–45.3)
Lab: NORMAL
MCH RBC QN AUTO: 29.9 PG (ref 26.6–33)
MCHC RBC AUTO-ENTMCNC: 31.5 G/DL (ref 31.5–35.7)
MCV RBC AUTO: 94.8 FL (ref 79–97)
MONOCYTES # BLD AUTO: 0.77 10*3/MM3 (ref 0.1–0.9)
MONOCYTES NFR BLD AUTO: 11.1 % (ref 5–12)
NEUTROPHILS NFR BLD AUTO: 4.63 10*3/MM3 (ref 1.7–7)
NEUTROPHILS NFR BLD AUTO: 67.2 % (ref 42.7–76)
PLATELET # BLD AUTO: 44 10*3/MM3 (ref 140–450)
PMV BLD AUTO: 12.1 FL (ref 6–12)
POTASSIUM SERPL-SCNC: 4.1 MMOL/L (ref 3.5–5.2)
PROT SERPL-MCNC: 7.2 G/DL (ref 6–8.5)
RBC # BLD AUTO: 4.02 10*6/MM3 (ref 3.77–5.28)
SODIUM SERPL-SCNC: 139 MMOL/L (ref 136–145)
TRIGL SERPL-MCNC: 148 MG/DL (ref 0–150)
TROPONIN T SERPL-MCNC: 0.03 NG/ML (ref 0–0.03)
TSH SERPL DL<=0.05 MIU/L-ACNC: 0.94 UIU/ML (ref 0.27–4.2)
URATE SERPL-MCNC: 8.9 MG/DL (ref 2.4–5.7)
VIT B12 BLD-MCNC: 238 PG/ML (ref 211–946)
VLDLC SERPL-MCNC: 25 MG/DL (ref 5–40)
WBC NRBC COR # BLD: 6.91 10*3/MM3 (ref 3.4–10.8)

## 2022-03-04 PROCEDURE — 63710000001 PREDNISONE PER 5 MG: Performed by: INTERNAL MEDICINE

## 2022-03-04 PROCEDURE — 85025 COMPLETE CBC W/AUTO DIFF WBC: CPT | Performed by: HOSPITALIST

## 2022-03-04 PROCEDURE — 84165 PROTEIN E-PHORESIS SERUM: CPT | Performed by: INTERNAL MEDICINE

## 2022-03-04 PROCEDURE — 97535 SELF CARE MNGMENT TRAINING: CPT | Performed by: OCCUPATIONAL THERAPIST

## 2022-03-04 PROCEDURE — 25010000002 CYANOCOBALAMIN PER 1000 MCG: Performed by: INTERNAL MEDICINE

## 2022-03-04 PROCEDURE — 84443 ASSAY THYROID STIM HORMONE: CPT | Performed by: HOSPITALIST

## 2022-03-04 PROCEDURE — 84484 ASSAY OF TROPONIN QUANT: CPT | Performed by: HOSPITALIST

## 2022-03-04 PROCEDURE — 86334 IMMUNOFIX E-PHORESIS SERUM: CPT | Performed by: INTERNAL MEDICINE

## 2022-03-04 PROCEDURE — 99232 SBSQ HOSP IP/OBS MODERATE 35: CPT | Performed by: INTERNAL MEDICINE

## 2022-03-04 PROCEDURE — 80061 LIPID PANEL: CPT | Performed by: HOSPITALIST

## 2022-03-04 PROCEDURE — 83615 LACTATE (LD) (LDH) ENZYME: CPT | Performed by: INTERNAL MEDICINE

## 2022-03-04 PROCEDURE — 80053 COMPREHEN METABOLIC PANEL: CPT | Performed by: HOSPITALIST

## 2022-03-04 PROCEDURE — 63710000001 PREDNISONE PER 1 MG: Performed by: INTERNAL MEDICINE

## 2022-03-04 PROCEDURE — 83521 IG LIGHT CHAINS FREE EACH: CPT | Performed by: INTERNAL MEDICINE

## 2022-03-04 PROCEDURE — 84550 ASSAY OF BLOOD/URIC ACID: CPT | Performed by: INTERNAL MEDICINE

## 2022-03-04 PROCEDURE — 25010000002 FUROSEMIDE PER 20 MG: Performed by: INTERNAL MEDICINE

## 2022-03-04 PROCEDURE — 82962 GLUCOSE BLOOD TEST: CPT

## 2022-03-04 PROCEDURE — 83036 HEMOGLOBIN GLYCOSYLATED A1C: CPT | Performed by: HOSPITALIST

## 2022-03-04 PROCEDURE — 63710000001 INSULIN LISPRO (HUMAN) PER 5 UNITS: Performed by: HOSPITALIST

## 2022-03-04 PROCEDURE — 99232 SBSQ HOSP IP/OBS MODERATE 35: CPT | Performed by: NURSE PRACTITIONER

## 2022-03-04 PROCEDURE — 82607 VITAMIN B-12: CPT | Performed by: INTERNAL MEDICINE

## 2022-03-04 PROCEDURE — 82784 ASSAY IGA/IGD/IGG/IGM EACH: CPT | Performed by: INTERNAL MEDICINE

## 2022-03-04 PROCEDURE — 97166 OT EVAL MOD COMPLEX 45 MIN: CPT | Performed by: OCCUPATIONAL THERAPIST

## 2022-03-04 PROCEDURE — 82746 ASSAY OF FOLIC ACID SERUM: CPT | Performed by: INTERNAL MEDICINE

## 2022-03-04 RX ORDER — ROSUVASTATIN CALCIUM 5 MG/1
5 TABLET, COATED ORAL NIGHTLY
Status: DISCONTINUED | OUTPATIENT
Start: 2022-03-04 | End: 2022-03-09 | Stop reason: HOSPADM

## 2022-03-04 RX ORDER — CHOLECALCIFEROL (VITAMIN D3) 125 MCG
1000 CAPSULE ORAL DAILY
Status: DISCONTINUED | OUTPATIENT
Start: 2022-03-04 | End: 2022-03-04

## 2022-03-04 RX ORDER — CYANOCOBALAMIN 1000 UG/ML
1000 INJECTION, SOLUTION INTRAMUSCULAR; SUBCUTANEOUS DAILY
Status: COMPLETED | OUTPATIENT
Start: 2022-03-04 | End: 2022-03-06

## 2022-03-04 RX ORDER — HYDRALAZINE HYDROCHLORIDE 25 MG/1
25 TABLET, FILM COATED ORAL EVERY 8 HOURS SCHEDULED
Status: DISCONTINUED | OUTPATIENT
Start: 2022-03-04 | End: 2022-03-09 | Stop reason: HOSPADM

## 2022-03-04 RX ORDER — BISACODYL 10 MG
10 SUPPOSITORY, RECTAL RECTAL ONCE
Status: COMPLETED | OUTPATIENT
Start: 2022-03-04 | End: 2022-03-04

## 2022-03-04 RX ADMIN — FAMOTIDINE 20 MG: 20 TABLET ORAL at 21:20

## 2022-03-04 RX ADMIN — GABAPENTIN 200 MG: 100 CAPSULE ORAL at 21:19

## 2022-03-04 RX ADMIN — INSULIN LISPRO 4 UNITS: 100 INJECTION, SOLUTION INTRAVENOUS; SUBCUTANEOUS at 11:53

## 2022-03-04 RX ADMIN — AMLODIPINE BESYLATE 10 MG: 10 TABLET ORAL at 09:20

## 2022-03-04 RX ADMIN — ROSUVASTATIN CALCIUM 5 MG: 20 TABLET, FILM COATED ORAL at 21:19

## 2022-03-04 RX ADMIN — LEVOTHYROXINE SODIUM 25 MCG: 0.03 TABLET ORAL at 09:18

## 2022-03-04 RX ADMIN — FUROSEMIDE 60 MG: 10 INJECTION, SOLUTION INTRAMUSCULAR; INTRAVENOUS at 02:21

## 2022-03-04 RX ADMIN — PREDNISONE 30 MG: 20 TABLET ORAL at 09:18

## 2022-03-04 RX ADMIN — METOPROLOL TARTRATE 25 MG: 25 TABLET, FILM COATED ORAL at 11:54

## 2022-03-04 RX ADMIN — BISACODYL 10 MG: 10 SUPPOSITORY RECTAL at 20:06

## 2022-03-04 RX ADMIN — FUROSEMIDE 60 MG: 10 INJECTION, SOLUTION INTRAMUSCULAR; INTRAVENOUS at 11:54

## 2022-03-04 RX ADMIN — MONTELUKAST SODIUM 10 MG: 10 TABLET, FILM COATED ORAL at 09:18

## 2022-03-04 RX ADMIN — CYANOCOBALAMIN 1000 MCG: 1000 INJECTION, SOLUTION INTRAMUSCULAR at 17:20

## 2022-03-04 RX ADMIN — FAMOTIDINE 20 MG: 20 TABLET ORAL at 09:18

## 2022-03-04 RX ADMIN — FUROSEMIDE 60 MG: 10 INJECTION, SOLUTION INTRAMUSCULAR; INTRAVENOUS at 18:05

## 2022-03-04 RX ADMIN — ISOSORBIDE MONONITRATE 30 MG: 30 TABLET ORAL at 09:18

## 2022-03-04 RX ADMIN — INSULIN LISPRO 2 UNITS: 100 INJECTION, SOLUTION INTRAVENOUS; SUBCUTANEOUS at 09:18

## 2022-03-04 RX ADMIN — TAMSULOSIN HYDROCHLORIDE 0.4 MG: 0.4 CAPSULE ORAL at 21:19

## 2022-03-04 RX ADMIN — METOPROLOL TARTRATE 12.5 MG: 25 TABLET, FILM COATED ORAL at 22:17

## 2022-03-04 RX ADMIN — HYDRALAZINE HYDROCHLORIDE 25 MG: 25 TABLET, FILM COATED ORAL at 21:20

## 2022-03-04 NOTE — PROGRESS NOTES
"    Patient Name: Helena Carmen  :1931  89 y.o.      Patient Care Team:  Mariah Hickman MD as PCP - General (Family Medicine)  Beth Butcher MD as Consulting Physician (Cardiology)    Chief Complaint: follow up shortness of breath, diastolic congestive heart failure    Interval History: she is resting in bed. She is lying flat without orthopnea. She remains on 2 liters nasal canula. Good UOP.        Objective   Vital Signs  Temp:  [97.5 °F (36.4 °C)-98.5 °F (36.9 °C)] 98.1 °F (36.7 °C)  Heart Rate:  [55-66] 66  Resp:  [18] 18  BP: (147-190)/(52-70) 160/61    Intake/Output Summary (Last 24 hours) at 3/4/2022 1102  Last data filed at 3/4/2022 1015  Gross per 24 hour   Intake --   Output 3525 ml   Net -3525 ml     Flowsheet Rows      First Filed Value   Admission Height 144.8 cm (57\") Documented at 2022 1624   Admission Weight 81.2 kg (179 lb) Documented at 2022 1624          Physical Exam:   General Appearance:    Alert, cooperative, in no acute distress   Lungs:     Clear to auscultation.  Normal respiratory effort and rate.      Heart:    Regular rhythm and normal rate, normal S1 and S2, no murmurs, gallops or rubs.     Chest Wall:    No abnormalities observed   Abdomen:     Soft, nontender, positive bowel sounds.     Extremities:   no cyanosis, clubbing or edema.  No marked joint deformities.  Adequate musculoskeletal strength.       Results Review:    Results from last 7 days   Lab Units 22  0645   SODIUM mmol/L 139   POTASSIUM mmol/L 4.1   CHLORIDE mmol/L 96*   CO2 mmol/L 29.0   BUN mg/dL 62*   CREATININE mg/dL 2.70*   GLUCOSE mg/dL 159*   CALCIUM mg/dL 9.9*     Results from last 7 days   Lab Units 22  0645 22  1632   TROPONIN T ng/mL 0.034* 0.024     Results from last 7 days   Lab Units 22  0645   WBC 10*3/mm3 6.91   HEMOGLOBIN g/dL 12.0   HEMATOCRIT % 38.1   PLATELETS 10*3/mm3 44*         Results from last 7 days   Lab Units 22  1158   MAGNESIUM " mg/dL 1.9     Results from last 7 days   Lab Units 03/04/22  0645   CHOLESTEROL mg/dL 114   TRIGLYCERIDES mg/dL 148   HDL CHOL mg/dL 39*   LDL CHOL mg/dL 50               Medication Review:   amLODIPine, 10 mg, Oral, Daily  famotidine, 20 mg, Oral, BID  furosemide, 60 mg, Intravenous, Q8H  gabapentin, 200 mg, Oral, Nightly  insulin lispro, 0-7 Units, Subcutaneous, TID AC  isosorbide mononitrate, 30 mg, Oral, Daily  levothyroxine, 25 mcg, Oral, Daily  magnesium sulfate, 2 g, Intravenous, Once  metoprolol tartrate, 25 mg, Oral, Q12H  montelukast, 10 mg, Oral, Daily  predniSONE, 30 mg, Oral, Daily With Breakfast  rosuvastatin, 20 mg, Oral, Nightly  tamsulosin, 0.4 mg, Oral, Nightly              Assessment/Plan   1. Acute on chronic diastolic heart failure - IV lasix. Needs better blood pressure control.   2. Hypertension - not at goal. She is on amlodipine max. Unable to titrate beta blocker due to mild bradycardia. No ACE/ARB due to renal function. Would resume home hydralazine if ok with nephrology.  3. Acute on chronic kidney disease - cardiorenal. Nephrology is following. Creatinine holding ~2.7 with diuretics.   4.  Pulmonary hypertension - RVSP on recent echo only 38mmHg. I would continue to treat heart failure and see how she does. Do not feel right heart cath warranted at this time. Discussed with Dr. Palacios.   5. Aortic valve replacement - peak 34.7 mmHg, mean 16.4 mmHg, JUDD 1.6cm2, DI 0.6. appears stable.  6. Diabetes mellitus type II    RIGO Poole  [facility]  03/04/22  11:02 EST

## 2022-03-04 NOTE — PLAN OF CARE
Goal Outcome Evaluation:  Plan of Care Reviewed With: patient, family        Progress: improving  Outcome Summary: pt admitted from home where she lives alone, uses a walker or can and has a shower chair. she was admitted w. CHF and had abdominal surgery about 3 years ago, per pt report for gallbladder removal. pt is also legally blind. pt this date was min A w bed mobility but mod x2 to scoot up in the bed, sat EOB SBA, did not stand due to fatigue. Pt completed bathing skills EOB SBA UB and mod/max LB. Pt completed UBD min A and grooming skills SBA. Pt has decr strength and endurance and recommend cont OT to incr ADl, strength,balance,and tsf. Pt may need rehab prior to dc or go home w family and HH before being at home alone.  OT wore all PPE, washed hands before/after

## 2022-03-04 NOTE — PLAN OF CARE
Goal Outcome Evaluation:         Pt medicated per orders. Pt medicated with PRN medication for anxiety. Pt medicated with PRN medication for back pain. Pt had relief. Pt bladder scanned and showed >999. Straight cath performed. Pt on 2L nasal cannula throughout shift. Vital signs within normal limits at this time.

## 2022-03-04 NOTE — THERAPY EVALUATION
Patient Name: Helena Carmen  : 1931    MRN: [id number]                              Today's Date: 3/4/2022       Admit Date: 3/2/2022    Visit Dx:     ICD-10-CM ICD-9-CM   1. Acute on chronic combined systolic and diastolic congestive heart failure (HCC)  I50.43 428.43     428.0   2. Chronic kidney disease, unspecified CKD stage  N18.9 585.9     Patient Active Problem List   Diagnosis   • Aortic valve stenosis   • Diastolic dysfunction   • Fatigue   • MI (mitral incompetence)   • PVC (premature ventricular contraction)   • Legally blind   • Essential hypertension   • Hypertriglyceridemia   • Pulmonary hypertension (HCC)   • Paroxysmal atrial fibrillation (HCC)   • Breast screening   • Abdominal pain, right lower quadrant   • Allergic rhinitis   • Anxiety   • Chronic kidney disease   • Chronic pain disorder   • Degeneration of lumbar intervertebral disc   • Diabetes mellitus (HCC)   • Esophageal reflux   • Gastroparesis   • Hiatal hernia   • Iatrogenic hypothyroidism   • Long term (current) use of opiate analgesic   • Macular degeneration   • Malignant neoplasm of uterus (HCC)   • Osteoarthritis of knee   • Peripheral nerve disease   • Secondary hyperparathyroidism (HCC)   • Thrombocytopenia (HCC)   • Mitral valve regurgitation   • History of aortic valve replacement   • Nonrheumatic tricuspid valve regurgitation   • Elevated serum creatinine   • Chronic diastolic congestive heart failure (HCC)   • CHF (congestive heart failure) (HCC)   • Other cirrhosis of liver (HCC)     Past Medical History:   Diagnosis Date   • Aortic valve stenosis     s/p tissue AVR   • Back pain    • Colitis due to Clostridioides difficile 2022   • Diastolic dysfunction     Grade 2 per echocardiogram    • Diverticulosis    • Exertional shortness of breath    • Hiatal hernia    • Hyperlipidemia    • Hypertension    • Hyperthyroidism    • Hypertriglyceridemia 2018   • Hypothyroidism    • Left ventricular hypertrophy    •  Legally blind    • Macular degeneration    • Mitral regurgitation    • Osteoarthritis of hip    • Pancreatitis 1/26/2022   • Paroxysmal atrial fibrillation (HCC)    • Premature ventricular contractions    • Pulmonary hypertension (HCC)    • Renal insufficiency syndrome    • Type 2 diabetes mellitus (HCC)      Past Surgical History:   Procedure Laterality Date   • AORTIC VALVE REPAIR/REPLACEMENT     • CATARACT EXTRACTION      1970, 1999   • ENDOSCOPY  08/15/2014    no gross lesions in stomach/duodenum, erythrematous mucosa in stomach   • HYSTERECTOMY  2007   • STERNOTOMY        General Information     Row Name 03/04/22 1528          OT Time and Intention    Document Type evaluation; therapy note (daily note)  -     Mode of Treatment individual therapy; occupational therapy  -     Row Name 03/04/22 1528          General Information    Patient Profile Reviewed yes  -     Prior Level of Function independent:; all household mobility; community mobility; gait; transfer; ADL's; grooming; bed mobility  -     Existing Precautions/Restrictions fall  -     Barriers to Rehab none identified  -     Row Name 03/04/22 1528          Living Environment    Lives With alone  -[facility] Name 03/04/22 1528          Cognition    Orientation Status (Cognition) oriented x 4  -     Row Name 03/04/22 1528          Safety Issues, Functional Mobility    Impairments Affecting Function (Mobility) balance; strength  -           User Key  (r) = Recorded By, (t) = Taken By, (c) = Cosigned By    Initials Name Provider Type     Annalise Barlow, OTR Occupational Therapist                 Mobility/ADL's     Row Name 03/04/22 1529          Bed Mobility    Bed Mobility supine-sit; sit-supine; scooting/bridging  -     Scooting/Bridging Cannon (Bed Mobility) set up; moderate assist (50% patient effort); 2 person assist  -     Supine-Sit [place] (Bed Mobility) set up; minimum assist (75% patient effort)  -      Sit-Supine [place] (Bed Mobility) set up; minimum assist (75% patient effort)  -     Row Name 03/04/22 1529          Transfers    Comment (Transfers) NT due to fatigue  -     Row Name 03/04/22 1529          Activities of Daily Living    BADL Assessment/Intervention bathing; upper body dressing; grooming  -     Row Name 03/04/22 1529          Bathing Assessment/Intervention    [place] Level (Bathing) bathing skills; upper body; set up; standby assist; lower body; moderate assist (50% patient effort)  -     Position (Bathing) edge of bed sitting  -     Row Name 03/04/22 1529          Upper Body Dressing Assessment/Training    [place] Level (Upper Body Dressing) upper body dressing skills; doff; don; set up; minimum assist (75% patient effort); pajama/robe  -     Position (Upper Body Dressing) edge of bed sitting  -     Row Name 03/04/22 1529          Grooming Assessment/Training    [place] Level (Grooming) grooming skills; oral care regimen; wash face, hands; set up; standby assist  -     Position (Grooming) edge of bed sitting  -           User Key  (r) = Recorded By, (t) = Taken By, (c) = Cosigned By    Initials Name Provider Type     Annalise Barlow, OTR Occupational Therapist               Obj/Interventions     Row Name 03/04/22 1530          Sensory Assessment (Somatosensory)    Sensory Assessment (Somatosensory) sensation intact  -[facility] Name 03/04/22 1530          Range of Motion Comprehensive    General Range of Motion bilateral upper extremity ROM WNL  -     Comment, General Range of Motion B UE 8/8  -[facility] Name 03/04/22 1530          Strength Comprehensive (MMT)    General Manual Muscle Testing (MMT) Assessment upper extremity strength deficits identified  -     Comment, General Manual Muscle Testing (MMT) Assessment B UE 3+/5  -     Row Name 03/04/22 1530          Motor Skills    Motor Skills coordination; functional endurance  -      Coordination WFL  -KP     Functional Endurance fair +  -KP     [facility] Name 03/04/22 1530          Balance    Balance Assessment sitting static balance  -KP     Static Sitting Balance WFL; sitting, edge of bed  -KP     Balance Interventions sitting; static; supported; minimal challenge; occupation based/functional task  -KP     Comment, Balance SBA sitting about 25 min EOB  -KP           User Key  (r) = Recorded By, (t) = Taken By, (c) = Cosigned By    Initials Name Provider Type    Annalise Del Angel, OTR Occupational Therapist               Goals/Plan     Row Name 03/04/22 1534          Bathing Goal 1 (OT)    Activity/Device (Bathing Goal 1, OT) bathing skills, all  -KP     [place] Level/Cues Needed (Bathing Goal 1, OT) set-up required; minimum assist (75% or more patient effort)  -KP     Time Frame (Bathing Goal 1, OT) short term goal (STG); 2 weeks  -KP     Progress/Outcomes (Bathing Goal 1, OT) goal ongoing  -[facility] Name 03/04/22 1534          Dressing Goal 1 (OT)    Activity/Device (Dressing Goal 1, OT) upper body dressing  -KP     [place]/Cues Needed (Dressing Goal 1, OT) set-up required; standby assist  -KP     Time Frame (Dressing Goal 1, OT) short term goal (STG); 2 weeks  -KP     Progress/Outcome (Dressing Goal 1, OT) goal ongoing  -[facility] Name 03/04/22 1534          Grooming Goal 1 (OT)    Activity/Device (Grooming Goal 1, OT) grooming skills, all  -KP     [place] (Grooming Goal 1, OT) supervision required  -KP     Time Frame (Grooming Goal 1, OT) short term goal (STG); 2 weeks  -KP     Progress/Outcome (Grooming Goal 1, OT) goal ongoing  -[facility] Name 03/04/22 1534          Strength Goal 1 (OT)    Strength Goal 1 (OT) incr B UE to 4/5  -KP     Time Frame (Strength Goal 1, OT) short term goal (STG); 2 weeks  -KP     Progress/Outcome (Strength Goal 1, OT) goal ongoing  -[facility] Name 03/04/22 1534          Therapy Assessment/Plan (OT)    Planned Therapy Interventions (OT)  activity tolerance training; functional balance retraining; occupation/activity based interventions; BADL retraining; passive ROM/stretching; transfer/mobility retraining; patient/caregiver education/training; strengthening exercise  -           User Key  (r) = Recorded By, (t) = Taken By, (c) = Cosigned By    Initials Name Provider Type    Annalise Del Angel, OTR Occupational Therapist               Clinical Impression     Row Name 03/04/22 1531          Pain Assessment    Additional Documentation Pain Scale: Numbers Pre/Post-Treatment (Group)  -     Row Name 03/04/22 1531          Pain Scale: Numbers Pre/Post-Treatment    Pretreatment Pain Rating 0/10 - no pain  -     Posttreatment Pain Rating 0/10 - no pain  -     Row Name 03/04/22 1531          Plan of Care Review    Plan of Care Reviewed With patient; family  -     Progress improving  -     Outcome Summary pt admitted from home where she lives alone, uses a walker or can and has a shower chair. she was admitted w. CHF and had abdominal surgery about 3 years ago, per pt report for gallbladder removal. pt is also legally blind. pt this date was min A w bed mobility but mod x2 to scoot up in the bed, sat EOB SBA, did not stand due to fatigue. Pt completed bathing skills EOB SBA UB and mod/max LB. Pt completed UBD min A and grooming skills SBA. Pt has decr strength and endurance and recommend cont OT to incr ADl, strength,balance,and tsf. Pt may need rehab prior to dc or go home w family and HH before being at home alone.  -     Row Name 03/04/22 1531          Therapy Assessment/Plan (OT)    Rehab Potential (OT) good, to achieve stated therapy goals  -     Criteria for Skilled Therapeutic Interventions Met (OT) yes; meets criteria; skilled treatment is necessary  -     Therapy Frequency (OT) 5 times/wk  -     Row Name 03/04/22 1531          Therapy Plan Review/Discharge Plan (OT)    Anticipated Discharge Disposition (OT) skilled nursing  facility; home with home health; home with assist  -     Row Name 03/04/22 1531          Positioning and Restraints    Pre-Treatment Position in bed  -KP     Post Treatment Position bed  -KP     In Bed fowlers; call light within reach; encouraged to call for assist; with family/caregiver  -           User Key  (r) = Recorded By, (t) = Taken By, (c) = Cosigned By    Initials Name Provider Type    Annalise Del Angel OTR Occupational Therapist               Outcome Measures     Row Name 03/04/22 1534          How much help from another is currently needed...    Putting on and taking off regular lower body clothing? 2  -KP     Bathing (including washing, rinsing, and drying) 2  -KP     Toileting (which includes using toilet bed pan or urinal) 2  -KP     Putting on and taking off regular upper body clothing 3  -KP     Taking care of personal grooming (such as brushing teeth) 3  -KP     Eating meals 3  -KP     AM-PAC 6 Clicks Score (OT) 15  -     Row Name 03/04/22 1534          Functional Assessment    Outcome Measure Options AM-PAC 6 Clicks Daily Activity (OT)  -           User Key  (r) = Recorded By, (t) = Taken By, (c) = Cosigned By    Initials Name Provider Type    Annalise Del Angel OTR Occupational Therapist                Occupational Therapy Education                 Title: PT OT SLP Therapies (Done)     Topic: Occupational Therapy (Done)     Point: ADL training (Done)     Description:   Instruct learner(s) on proper safety adaptation and remediation techniques during self care or transfers.   Instruct in proper use of assistive devices.              Learning Progress Summary           Patient Acceptance, E,TB,D, VU,DU by  at 3/4/2022 1535    Comment: ed pt on role of OT. benefit of therapy, POC w. OT. ed on safety w. ADL   Family Acceptance, E,TB,D, VU,DU by  at 3/4/2022 1535    Comment: ed pt on role of OT. benefit of therapy, POC w. OT. ed on safety w. ADL                   Point:  Precautions (Done)     Description:   Instruct learner(s) on prescribed precautions during self-care and functional transfers.              Learning Progress Summary           Patient Acceptance, E,TB,D, VU,DU by  at 3/4/2022 1535    Comment: ed pt on role of OT. benefit of therapy, POC w. OT. ed on safety w. ADL   Family Acceptance, E,TB,D, VU,DU by  at 3/4/2022 1535    Comment: ed pt on role of OT. benefit of therapy, POC w. OT. ed on safety w. ADL                   Point: Body mechanics (Done)     Description:   Instruct learner(s) on proper positioning and spine alignment during self-care, functional mobility activities and/or exercises.              Learning Progress Summary           Patient Acceptance, E,TB,D, VU,DU by  at 3/4/2022 1535    Comment: ed pt on role of OT. benefit of therapy, POC w. OT. ed on safety w. ADL   Family Acceptance, E,TB,D, VU,DU by  at 3/4/2022 1535    Comment: ed pt on role of OT. benefit of therapy, POC w. OT. ed on safety w. ADL                               User Key     Initials Effective Dates Name Provider Type Discipline     06/16/21 -  Annalise Barlow, OTR Occupational Therapist OT              OT Recommendation and Plan  Planned Therapy Interventions (OT): activity tolerance training, functional balance retraining, occupation/activity based interventions, BADL retraining, passive ROM/stretching, transfer/mobility retraining, patient/caregiver education/training, strengthening exercise  Therapy Frequency (OT): 5 times/wk  Plan of Care Review  Plan of Care Reviewed With: patient, family  Progress: improving  Outcome Summary: pt admitted from home where she lives alone, uses a walker or can and has a shower chair. she was admitted w. CHF and had abdominal surgery about 3 years ago, per pt report for gallbladder removal. pt is also legally blind. pt this date was min A w bed mobility but mod x2 to scoot up in the bed, sat EOB SBA, did not stand due to fatigue. Pt  completed bathing skills EOB SBA UB and mod/max LB. Pt completed UBD min A and grooming skills SBA. Pt has decr strength and endurance and recommend cont OT to incr ADl, strength,balance,and tsf. Pt may need rehab prior to dc or go home w family and HH before being at home alone.     Time Calculation:    Time Calculation- OT     Row Name 03/04/22 1535             Time Calculation- OT    OT Start Time 1457  -KP      OT Stop Time 1525  -KP      OT Time Calculation (min) 28 min  -KP      Total Timed Code Minutes- OT 23 minute(s)  -KP      OT Received On 03/04/22  -      OT - Next Appointment 03/07/22  -      OT Goal Re-Cert Due Date 03/18/22  -              Timed Charges    76584 - OT Self Care/Mgmt Minutes 23  -KP              Untimed Charges    OT Eval/Re-eval Minutes 5  -KP              Total Minutes    Timed Charges Total Minutes 23  -KP      Untimed Charges Total Minutes 5  -KP       Total Minutes 28  -KP            User Key  (r) = Recorded By, (t) = Taken By, (c) = Cosigned By    Initials Name Provider Type    Annalise Del Angel OTR Occupational Therapist              Therapy Charges for Today     Code Description Service Date Service Provider Modifiers Qty    21943080366 HC OT SELF CARE/MGMT/TRAIN EA 15 MIN 3/4/2022 Annalise Barlow OTR GO 2    36286491369 HC OT EVAL MOD COMPLEXITY 2 3/4/2022 Annalise Barlow OTR GO 1               PEREZ Wilcox  3/4/2022

## 2022-03-04 NOTE — NURSING NOTE
CWON note: consult received for Abdominal wound POA. Pt noted to have an midline abdominal scar, pt reports the surgery was many years ago, but the scab at the bottom will fall off and come back.  Abdomen is soft, there is a stable scab, less then 0.5cm in length located at distal aspect of the scar, slight induration, no erythema, fluctuance, or  Drainage noted. I am unsure why this comes and goes, but can be left open to air without any intervention needed. Please re-consult if this open and begins to drain.

## 2022-03-04 NOTE — PROGRESS NOTES
LOS: 2 days   Patient Care Team:  Mariah Hickman MD as PCP - General (Family Medicine)  Beth Butcher MD as Consulting Physician (Cardiology)    Chief Complaint: Severe thrombocytopenia.  CHF.    Interval History:  On 3/4/2022, patient reports tolerating oral prednisone.  She slept very well last night.  Reports no bleeding.    A comprehensive 14 point review of systems was performed and was negative except as mentioned.    Vital Signs:  Temp:  [97.5 °F (36.4 °C)-98.5 °F (36.9 °C)] 98.1 °F (36.7 °C)  Heart Rate:  [55-66] 66  Resp:  [18] 18  BP: (147-190)/(52-70) 160/61    Intake/Output Summary (Last 24 hours) at 3/4/2022 0924  Last data filed at 3/4/2022 0530  Gross per 24 hour   Intake --   Output 3025 ml   Net -3025 ml       Physical Exam:   General Appearance:    No acute distress, alert and oriented x4   Lungs:     Clear to auscultation bilaterally     Heart:    Regular rhythm and normal rate.  No murmurs, gallops, or       rubs.   Abdomen:     Soft, non-tender, non-distended.    Extremities:    No edema.  Mild bruises.     Results Review:   Results from last 7 days   Lab Units 03/04/22  0645 03/03/22  1531 03/02/22  1632   WBC 10*3/mm3 6.91  --  5.74   HEMOGLOBIN g/dL 12.0  --  12.2   HEMATOCRIT % 38.1  --  38.0   PLATELETS 10*3/mm3 44* 38* 39*       Lab Results   Component Value Date    NEUTROABS 4.63 03/04/2022     Results from last 7 days   Lab Units 03/04/22  0645 03/03/22  1158 03/02/22  1632   SODIUM mmol/L 139 137 131*   POTASSIUM mmol/L 4.1 4.4 4.8   CHLORIDE mmol/L 96* 98 94*   CO2 mmol/L 29.0 26.0 21.1*   BUN mg/dL 62* 56* 63*   CREATININE mg/dL 2.70* 2.68* 2.79*   CALCIUM mg/dL 9.9* 9.4 9.2   BILIRUBIN mg/dL 0.7  --  0.3   ALK PHOS U/L 32*  --  31*   ALT (SGPT) U/L 16  --  20   AST (SGOT) U/L 21  --  27   GLUCOSE mg/dL 159* 167* 172*     Results from last 7 days   Lab Units 03/04/22  0645 03/02/22  1632   TROPONIN T ng/mL 0.034* 0.024       Results from last 7 days   Lab Units  03/03/22  1158   MAGNESIUM mg/dL 1.9     Component      Latest Ref Rng & Units 3/3/2022 3/4/2022   IPF      0.90 - 6.50 % 12.20 (H)    Platelets      140 - 450 10*3/mm3 38 (LL)    LDH      135 - 214 U/L 253 (H) 238 (H)   Rheumatoid Factor Quantitative      0.0 - 14.0 IU/mL 10.0    Vitamin B-12      211 - 946 pg/mL  238   Folate      4.78 - 24.20 ng/mL  >20.00       I reviewed the patient's new clinical results.        ASSESSMENT:   1. Congestive heart failure, managed by cardiology service.     2. 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 Patient is followed by Nephrology service.     3. Severe thrombocytopenia with platelets 38,000 and IPF 12.2% on 3/3/2022. Also elevated LDH at 252. She has no evidence of hemolysis under the microscope and she is not anemic.      I found out on previous CT scan, multiple CT scans reported in 2014 and 2019 and evidence of 2013 reported that the patient has liver cirrhosis by imaging standards. Her daughter seems to not be aware of that completely. I discussed with the patient and her daughter that liver cirrhosis itself could cause thrombocytopenia despite she did not have evidence of splenomegaly based on a previous CT scan examination. Nevertheless, liver produces thrombopoietin and theoretically that would be low if having liver cirrhosis. She did have borderline low platelets in the 50,000 to 60,000 range in the past couple of years since 2018. On 02/25/2022 this patient had platelets of 46,000 from her kidney doctor’s office. 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 I discussed with the patient and her daughter recommended low-dose prednisone 30 mg daily. I discussed with them the potential side effects that definitely would cause hyperglycemia and she is already a diabetic not that well controlled and also can cause agitation and insomnia as well as side effects to the stomach to the extreme  that cause bleeding. Patient is already on Pepcid twice a day which is good to continue. We will evaluate whether the patient is responding to the oral steroid treatment. If her platelets could improve to their baseline level I would be happy and certainly we are not looking to improve platelets to normal level.      · Patient was started on prednisone 30 mg daily on 3/3/2022 with platelets 38,000 and IPF 12.2%.  · On 3/4/2022, patient reports good tolerance to prednisone.  Platelets 44,000.    ·     *  Vitamin B12 deficiency.  · Lab study on 3/4/2022 also showed vitamin B12 at 238 pg/mL.  Patient likely has functional deficiency.  We started patient on vitamin B12 at 1000 mcg intramuscular injection daily for 3 days then oral vitamin B12 at 1000 mcg daily.  Vitamin B12 is very benign and cheap therapy but potentially very useful to promote platelets production.        PLAN:    1. Continue prednisone 30 mg daily.   2. Start vitamin B12 injection intramuscular, at 1000 mcg daily for 3 doses.  It is a benign therapy, and very cheap, potentially useful to help with thrombopoiesis.  3. Continue Pepcid twice a day for GI prophylaxis.   4. Continue management for diabetes and CHF per primary team.   5. Pending study results for serum protein immunofixation, free light chains, quantification of immunoglobulin.   6. Monitor labs CBC, IPF and LDH in the morning.    Discussed with the patient at bedside.   Spoke with nursing staff.    Tera Paredes MD PhD  03/04/22  09:24 EST

## 2022-03-04 NOTE — PROGRESS NOTES
"Daily progress note    Chief complaint  Doing little better  Still short of breath but no chest pain palpitation   Making slow progress  No new complaints    History of present illness  89-year-old white female with history of diastolic CHF hypertension aortic stenosis s/p AVR pulmonary hypertension atrial fibrillation and chronic kidney disease stage IV presented to [facility] emergency room with increased shortness of breath generalized weakness leg swelling and weight gain.  Patient also have abdominal pain but no nausea vomiting diarrhea.  Patient denies any chest pain fever chills cough.  Patient work-up in ER revealed acute kidney injury and decompression CHF admit for management.  Patient is full code.    REVIEW OF SYSTEMS  All systems reviewed and negative except for those discussed in HPI.      PHYSICAL EXAM   Blood pressure 145/53, pulse 58, temperature 98.1 °F (36.7 °C), temperature source Oral, resp. rate 16, height 144.8 cm (57\"), weight 85.5 kg (188 lb 7.9 oz), SpO2 95 %.    GENERAL: Awake and alert, nontoxic-appearing  HENT: nares patent  EYES: no scleral icterus  CV: regular rhythm, regular rate  RESPIRATORY: Mild increased work of breathing with crackles in the bases  ABDOMEN: soft, nontender mildly distended bowel sounds positive  MUSCULOSKELETAL: no deformity, 1+ pedal edema in both legs into the thigh  NEURO: alert, moves all extremities, follows commands  SKIN: warm, dry     LAB RESULTS  Lab Results (last 24 hours)     Procedure Component Value Units Date/Time    BECCA Comprehensive Panel [[id number]] Collected: 03/03/22 1531    Specimen: Blood Updated: 03/04/22 1214     Anti-DNA (DS) Ab Qn 1 IU/mL      Comment:                                    Negative      <5                                     Equivocal  5 - 9                                     Positive      >9        RNP Antibodies 0.3 AI      Darling Antibodies <0.2 AI      Antiscleroderma-70 Antibodies <0.2 AI      PEDRITO SSA (RO) Ab " <0.2 AI      PEDRITO SSB (LA) Ab <0.2 AI      Antichromatin Antibodies <0.2 AI      FELIX-1 IgG <0.2 AI      Anti-Centromere B Antibodies <0.2 AI      See below: Comment     Comment: Autoantibody                       Disease Association  ------------------------------------------------------------                          Condition                  Frequency  ---------------------   ------------------------   ---------  Antinuclear Antibody,    SLE, mixed connective  Direct (BCECA-D)           tissue diseases  ---------------------   ------------------------   ---------  dsDNA                    SLE                        40 - 60%  ---------------------   ------------------------   ---------  Chromatin                Drug induced SLE                90%                           SLE                        48 - 97%  ---------------------   ------------------------   ---------  SSA (Ro)                 SLE                        25 - 35%                           Sjogren's Syndrome         40 - 70%                            Lupus                 100%  ---------------------   ------------------------   ---------  SSB (La)                 SLE                             10%                           Sjogren's Syndrome              30%  ---------------------   -----------------------    ---------  Sm (anti-Smith)          SLE                        15 - 30%  ---------------------   -----------------------    ---------  RNP                      Mixed Connective Tissue                           Disease                         95%  (U1 nRNP,                SLE                        30 - 50%  anti-ribonucleoprotein)  Polymyositis and/or                           Dermatomyositis                 20%  ---------------------   ------------------------   ---------  Scl-70 (antiDNA          Scleroderma (diffuse)      20 - 35%  topoisomerase)           Crest                           13%  ---------------------    ------------------------   ---------  Alexandrea-1                     Polymyositis and/or                           Dermatomyositis            20 - 40%  ---------------------   ------------------------   ---------  Centromere B             Scleroderma - Crest                           variant                         80%       Narrative:      Performed at:  [address]  [id number]  : Toño Flores PhD, Phone:  [phone]    POC Glucose Once [[id number]]  (Abnormal) Collected: 03/04/22 1055    Specimen: Blood Updated: 03/04/22 1058     Glucose 264 mg/dL      Comment: Meter: [id number] : [id number] [facility] CNABNER       Troponin [[id number]]  (Abnormal) Collected: 03/04/22 0645    Specimen: Blood Updated: 03/04/22 0801     Troponin T 0.034 ng/mL     Narrative:      Troponin T Reference Range:  <= 0.03 ng/mL-   Negative for AMI  >0.03 ng/mL-     Abnormal for myocardial necrosis.  Clinicians would have to utilize clinical acumen, EKG, Troponin and serial changes to determine if it is an Acute Myocardial Infarction or myocardial injury due to an underlying chronic condition.       Results may be falsely decreased if patient taking Biotin.      Folate [[id number]]  (Normal) Collected: 03/04/22 0645    Specimen: Blood Updated: 03/04/22 0752     Folate >20.00 ng/mL     Narrative:      Results may be falsely increased if patient taking Biotin.      Vitamin B12 [[id number]]  (Normal) Collected: 03/04/22 0645    Specimen: Blood Updated: 03/04/22 0752     Vitamin B-12 238 pg/mL     Narrative:      Results may be falsely increased if patient taking Biotin.      TSH [[id number]]  (Normal) Collected: 03/04/22 0645    Specimen: Blood Updated: 03/04/22 0742     TSH 0.936 uIU/mL     Comprehensive Metabolic Panel [[id number]]  (Abnormal) Collected: 03/04/22 0645    Specimen: Blood Updated: 03/04/22 0739     Glucose 159 mg/dL      BUN 62 mg/dL      Creatinine 2.70 mg/dL      Sodium 139 mmol/L       Potassium 4.1 mmol/L      Chloride 96 mmol/L      CO2 29.0 mmol/L      Calcium 9.9 mg/dL      Total Protein 7.2 g/dL      Albumin 4.00 g/dL      ALT (SGPT) 16 U/L      AST (SGOT) 21 U/L      Alkaline Phosphatase 32 U/L      Total Bilirubin 0.7 mg/dL      Globulin 3.2 gm/dL      A/G Ratio 1.3 g/dL      BUN/Creatinine Ratio 23.0     Anion Gap 14.0 mmol/L      eGFR 16.2 mL/min/1.73      Comment: National Kidney Foundation and American Society of Nephrology (ASN) Task Force recommended calculation based on the Chronic Kidney Disease Epidemiology Collaboration (CKD-EPI) equation refit without adjustment for race.       Narrative:      GFR Normal >60  Chronic Kidney Disease <60  Kidney Failure <15      Lactate Dehydrogenase [[id number]]  (Abnormal) Collected: 03/04/22 0645    Specimen: Blood Updated: 03/04/22 0739      U/L     Uric Acid [[id number]]  (Abnormal) Collected: 03/04/22 0645    Specimen: Blood Updated: 03/04/22 0739     Uric Acid 8.9 mg/dL     Lipid Panel [[id number]]  (Abnormal) Collected: 03/04/22 0645    Specimen: Blood Updated: 03/04/22 0739     Total Cholesterol 114 mg/dL      Triglycerides 148 mg/dL      HDL Cholesterol 39 mg/dL      LDL Cholesterol  50 mg/dL      VLDL Cholesterol 25 mg/dL      LDL/HDL Ratio 1.16    Narrative:      Cholesterol Reference Ranges  (U.S. Department of Health and Human Services ATP III Classifications)    Desirable          <200 mg/dL  Borderline High    200-239 mg/dL  High Risk          >240 mg/dL      Triglyceride Reference Ranges  (U.S. Department of Health and Human Services ATP III Classifications)    Normal           <150 mg/dL  Borderline High  150-199 mg/dL  High             200-499 mg/dL  Very High        >500 mg/dL    HDL Reference Ranges  (U.S. Department of Health and Human Services ATP III Classifications)    Low     <40 mg/dl (major risk factor for CHD)  High    >60 mg/dl ('negative' risk factor for CHD)        LDL Reference Ranges  (U.S. Department of  Health and Human Services ATP III Classifications)    Optimal          <100 mg/dL  Near Optimal     100-129 mg/dL  Borderline High  130-159 mg/dL  High             160-189 mg/dL  Very High        >189 mg/dL    Hemoglobin A1c [[id number]]  (Abnormal) Collected: 03/04/22 0645    Specimen: Blood Updated: 03/04/22 0725     Hemoglobin A1C 6.70 %     Narrative:      Hemoglobin A1C Ranges:    Increased Risk for Diabetes  5.7% to 6.4%  Diabetes                     >= 6.5%  Diabetic Goal                < 7.0%    CBC & Differential [[id number]]  (Abnormal) Collected: 03/04/22 0645    Specimen: Blood Updated: 03/04/22 0712    Narrative:      The following orders were created for panel order CBC & Differential.  Procedure                               Abnormality         Status                     ---------                               -----------         ------                     CBC Auto Differential[[id number]]        Abnormal            Final result                 Please view results for these tests on the individual orders.    CBC Auto Differential [[id number]]  (Abnormal) Collected: 03/04/22 0645    Specimen: Blood Updated: 03/04/22 0712     WBC 6.91 10*3/mm3      RBC 4.02 10*6/mm3      Hemoglobin 12.0 g/dL      Hematocrit 38.1 %      MCV 94.8 fL      MCH 29.9 pg      MCHC 31.5 g/dL      RDW 14.3 %      RDW-SD 49.7 fl      MPV 12.1 fL      Platelets 44 10*3/mm3      Neutrophil % 67.2 %      Lymphocyte % 19.5 %      Monocyte % 11.1 %      Eosinophil % 1.4 %      Basophil % 0.4 %      Neutrophils, Absolute 4.63 10*3/mm3      Lymphocytes, Absolute 1.35 10*3/mm3      Monocytes, Absolute 0.77 10*3/mm3      Eosinophils, Absolute 0.10 10*3/mm3      Basophils, Absolute 0.03 10*3/mm3     MARINE,PE and FLC, Serum [[id number]] Collected: 03/04/22 0645    Specimen: Blood Updated: 03/04/22 0657    POC Glucose Once [[id number]]  (Abnormal) Collected: 03/04/22 0618    Specimen: Blood Updated: 03/04/22 0620     Glucose 166 mg/dL      Comment:  Meter: [id number] : [id number] Jeffery JOHNSON       POC Glucose Once [[id number]]  (Abnormal) Collected: 03/03/22 2027    Specimen: Blood Updated: 03/03/22 2028     Glucose 241 mg/dL      Comment: Meter: [id number] : [id number] Jeffery JOHNSON       Sodium, Urine, Random - [[id number]] Collected: 03/03/22 1801    Specimen: Urine Updated: 03/03/22 1850     Sodium, Urine 93 mmol/L     Narrative:      Reference intervals for random urine have not been established.  Clinical usage is dependent upon physician's interpretation in combination with other laboratory tests.       Protein, Urine, Random - [[id number]] Collected: 03/03/22 1801    Specimen: Urine Updated: 03/03/22 1850     Total Protein, Urine 15.4 mg/dL     Narrative:      Reference intervals for random urine have not been established.  Clinical usage is dependent upon physician's interpretation in combination with other laboratory tests.       Creatinine, Urine, Random - [[id number]] Collected: 03/03/22 1801    Specimen: Urine Updated: 03/03/22 1850     Creatinine, Urine 26.7 mg/dL     Narrative:      Reference intervals for random urine have not been established.  Clinical usage is dependent upon physician's interpretation in combination with other laboratory tests.       POC Glucose Once [[id number]]  (Abnormal) Collected: 03/03/22 1619    Specimen: Blood Updated: 03/03/22 1621     Glucose 201 mg/dL      Comment: Meter: [id number] : [id number] Vanessa Tianna NA       Immature Platelet Fraction [[id number]]  (Abnormal) Collected: 03/03/22 1531    Specimen: Blood Updated: 03/03/22 1616     IPF 12.20 %      Platelets 38 10*3/mm3     Nuclear Antigen Antibody, IFA [[id number]] Collected: 03/03/22 1531    Specimen: Blood Updated: 03/03/22 1603    Lactate Dehydrogenase [[id number]]  (Abnormal) Collected: 03/03/22 1158    Specimen: Blood Updated: 03/03/22 1503      U/L     Rheumatoid Factor [[id number]]  (Normal) Collected:  03/03/22 1158    Specimen: Blood Updated: 03/03/22 1500     Rheumatoid Factor Quantitative 10.0 IU/mL         Imaging Results (Last 24 Hours)     ** No results found for the last 24 hours. **             ECG 12 Lead  Component   Ref Range & Units 3/2/22 1559   QT Interval   ms 449              HEART RATE= 50  bpm  RR Interval= 1196  ms  KS Interval= 170  ms  P Horizontal Axis= 36  deg  P Front Axis= 60  deg  QRSD Interval= 109  ms  QT Interval= 449  ms  QRS Axis= 64  deg  T Wave Axis= 58  deg  - OTHERWISE NORMAL ECG -  Sinus rhythm  Low voltage, precordial leads  Rate slower             Current Facility-Administered Medications:   •  acetaminophen (TYLENOL) tablet 1,000 mg, 1,000 mg, Oral, Q6H PRN, Mango Arnold MD, 1,000 mg at 03/03/22 2101  •  amLODIPine (NORVASC) tablet 10 mg, 10 mg, Oral, Daily, Mango Arnold MD, 10 mg at 03/04/22 0920  •  dextrose (D50W) (25 g/50 mL) IV injection 25 g, 25 g, Intravenous, Q15 Min PRN, Mango Arnold MD  •  dextrose (GLUTOSE) oral gel 15 g, 15 g, Oral, Q15 Min PRN, Mango Arnold MD  •  famotidine (PEPCID) tablet 20 mg, 20 mg, Oral, BID, Mango Arnold MD, 20 mg at 03/04/22 0918  •  furosemide (LASIX) injection 60 mg, 60 mg, Intravenous, Q8H, Jordan Vázquez MD, 60 mg at 03/04/22 1154  •  gabapentin (NEURONTIN) capsule 200 mg, 200 mg, Oral, Nightly, Jordan Vázquez MD, 200 mg at 03/03/22 2058  •  glucagon (human recombinant) (GLUCAGEN DIAGNOSTIC) injection 1 mg, 1 mg, Intramuscular, Q15 Min PRN, Mango Arnold MD  •  insulin lispro (ADMELOG) injection 0-7 Units, 0-7 Units, Subcutaneous, TID AC, Mango Arnold MD, 4 Units at 03/04/22 1153  •  isosorbide mononitrate (IMDUR) 24 hr tablet 30 mg, 30 mg, Oral, Daily, Mango Arnold MD, 30 mg at 03/04/22 0918  •  levothyroxine (SYNTHROID, LEVOTHROID) tablet 25 mcg, 25 mcg, Oral, Daily, Mango Arnold MD, 25 mcg at 03/04/22 0918  •  LORazepam (ATIVAN) tablet 0.5 mg, 0.5 mg, Oral, Q6H PRN, Mango Arnold MD, 0.5 mg at 03/03/22  2058  •  magnesium sulfate 2g/50 mL (PREMIX) infusion, 2 g, Intravenous, Once, Kadeem Rivas MD  •  metoprolol tartrate (LOPRESSOR) tablet 25 mg, 25 mg, Oral, Q12H, Kadeem Rivas MD, 25 mg at 03/04/22 1154  •  montelukast (SINGULAIR) tablet 10 mg, 10 mg, Oral, Daily, Kadeem Rivas MD, 10 mg at 03/04/22 0918  •  predniSONE (DELTASONE) tablet 30 mg, 30 mg, Oral, Daily With Breakfast, Tera Paredes MD PhD, 30 mg at 03/04/22 0918  •  rosuvastatin (CRESTOR) tablet 20 mg, 20 mg, Oral, Nightly, Kadeem Rivas MD, 20 mg at 03/03/22 2058  •  [COMPLETED] Insert peripheral IV, , , Once **AND** sodium chloride 0.9 % flush 10 mL, 10 mL, Intravenous, PRN, Tomás Garnett MD, 10 mL at 03/03/22 0028  •  tamsulosin (FLOMAX) 24 hr capsule 0.4 mg, 0.4 mg, Oral, Nightly, Kadeem Rivas MD, 0.4 mg at 03/03/22 2058     ASSESSMENT  Acute on chronic diastolic CHF  Acute kidney injury  Chronic kidney disease stage IV  Paroxysmal atrial fibrillation  Aortic stenosis s/p AVR  Diabetes mellitus  Hypertension  Hyperlipidemia  Thrombocytopenia  Pulmonary hypertension  Gastroesophageal reflux disease    PLAN  CPM  IV diuresis  Prednisone   Strict I's and O's and daily weight  Nephrology consult appreciated  Cardiology and hematology to follow patient  Adjust home medications  Stress ulcer DVT prophylaxis  Supportive care  PT/OT  Discussed with nursing staff  Follow closely and further recommendation according to hospital course    KADEEM RIVAS MD

## 2022-03-05 ENCOUNTER — APPOINTMENT (OUTPATIENT)
Dept: ULTRASOUND IMAGING | Facility: HOSPITAL | Age: 87
End: 2022-03-05

## 2022-03-05 LAB
ANA HOMOGEN TITR SER: ABNORMAL {TITER}
ANA TITR SER IF: POSITIVE {TITER}
ANION GAP SERPL CALCULATED.3IONS-SCNC: 15 MMOL/L (ref 5–15)
BASOPHILS # BLD AUTO: 0.02 10*3/MM3 (ref 0–0.2)
BASOPHILS NFR BLD AUTO: 0.3 % (ref 0–1.5)
BUN SERPL-MCNC: 68 MG/DL (ref 8–23)
BUN/CREAT SERPL: 25.2 (ref 7–25)
CALCIUM SPEC-SCNC: 9.8 MG/DL (ref 8.2–9.6)
CHLORIDE SERPL-SCNC: 92 MMOL/L (ref 98–107)
CO2 SERPL-SCNC: 29 MMOL/L (ref 22–29)
CREAT SERPL-MCNC: 2.7 MG/DL (ref 0.57–1)
DEPRECATED RDW RBC AUTO: 48.2 FL (ref 37–54)
EGFRCR SERPLBLD CKD-EPI 2021: 16.2 ML/MIN/1.73
EOSINOPHIL # BLD AUTO: 0.02 10*3/MM3 (ref 0–0.4)
EOSINOPHIL NFR BLD AUTO: 0.3 % (ref 0.3–6.2)
ERYTHROCYTE [DISTWIDTH] IN BLOOD BY AUTOMATED COUNT: 14.3 % (ref 12.3–15.4)
GLUCOSE BLDC GLUCOMTR-MCNC: 181 MG/DL (ref 70–130)
GLUCOSE BLDC GLUCOMTR-MCNC: 215 MG/DL (ref 70–130)
GLUCOSE BLDC GLUCOMTR-MCNC: 266 MG/DL (ref 70–130)
GLUCOSE BLDC GLUCOMTR-MCNC: 316 MG/DL (ref 70–130)
GLUCOSE BLDC GLUCOMTR-MCNC: 319 MG/DL (ref 70–130)
GLUCOSE BLDC GLUCOMTR-MCNC: 351 MG/DL (ref 70–130)
GLUCOSE BLDC GLUCOMTR-MCNC: 365 MG/DL (ref 70–130)
GLUCOSE SERPL-MCNC: 213 MG/DL (ref 65–99)
HCT VFR BLD AUTO: 36.4 % (ref 34–46.6)
HGB BLD-MCNC: 11.8 G/DL (ref 12–15.9)
IMM GRANULOCYTES # BLD AUTO: 0.02 10*3/MM3 (ref 0–0.05)
IMM GRANULOCYTES NFR BLD AUTO: 0.3 % (ref 0–0.5)
LDH SERPL-CCNC: 226 U/L (ref 135–214)
LYMPHOCYTES # BLD AUTO: 1.5 10*3/MM3 (ref 0.7–3.1)
LYMPHOCYTES NFR BLD AUTO: 20.2 % (ref 19.6–45.3)
Lab: ABNORMAL
MCH RBC QN AUTO: 29.8 PG (ref 26.6–33)
MCHC RBC AUTO-ENTMCNC: 32.4 G/DL (ref 31.5–35.7)
MCV RBC AUTO: 91.9 FL (ref 79–97)
MONOCYTES # BLD AUTO: 0.85 10*3/MM3 (ref 0.1–0.9)
MONOCYTES NFR BLD AUTO: 11.4 % (ref 5–12)
NEUTROPHILS NFR BLD AUTO: 5.03 10*3/MM3 (ref 1.7–7)
NEUTROPHILS NFR BLD AUTO: 67.5 % (ref 42.7–76)
NRBC BLD AUTO-RTO: 0 /100 WBC (ref 0–0.2)
PLATELET # BLD AUTO: 59 10*3/MM3 (ref 140–450)
PLATELET # BLD AUTO: 59 10*3/MM3 (ref 140–450)
PLATELETS.RETICULATED NFR BLD AUTO: 11.7 % (ref 0.9–6.5)
PMV BLD AUTO: 13.5 FL (ref 6–12)
POTASSIUM SERPL-SCNC: 3.8 MMOL/L (ref 3.5–5.2)
RBC # BLD AUTO: 3.96 10*6/MM3 (ref 3.77–5.28)
SODIUM SERPL-SCNC: 136 MMOL/L (ref 136–145)
WBC NRBC COR # BLD: 7.44 10*3/MM3 (ref 3.4–10.8)

## 2022-03-05 PROCEDURE — 97161 PT EVAL LOW COMPLEX 20 MIN: CPT

## 2022-03-05 PROCEDURE — 63710000001 PREDNISONE PER 5 MG: Performed by: INTERNAL MEDICINE

## 2022-03-05 PROCEDURE — 25010000002 CYANOCOBALAMIN PER 1000 MCG: Performed by: INTERNAL MEDICINE

## 2022-03-05 PROCEDURE — 83615 LACTATE (LD) (LDH) ENZYME: CPT | Performed by: INTERNAL MEDICINE

## 2022-03-05 PROCEDURE — 99232 SBSQ HOSP IP/OBS MODERATE 35: CPT | Performed by: INTERNAL MEDICINE

## 2022-03-05 PROCEDURE — 85055 RETICULATED PLATELET ASSAY: CPT | Performed by: INTERNAL MEDICINE

## 2022-03-05 PROCEDURE — 85025 COMPLETE CBC W/AUTO DIFF WBC: CPT | Performed by: HOSPITALIST

## 2022-03-05 PROCEDURE — 80048 BASIC METABOLIC PNL TOTAL CA: CPT | Performed by: HOSPITALIST

## 2022-03-05 PROCEDURE — 97110 THERAPEUTIC EXERCISES: CPT

## 2022-03-05 PROCEDURE — 82962 GLUCOSE BLOOD TEST: CPT

## 2022-03-05 PROCEDURE — 63710000001 PREDNISONE PER 1 MG: Performed by: INTERNAL MEDICINE

## 2022-03-05 PROCEDURE — 63710000001 INSULIN LISPRO (HUMAN) PER 5 UNITS: Performed by: HOSPITALIST

## 2022-03-05 PROCEDURE — 25010000002 FUROSEMIDE PER 20 MG: Performed by: INTERNAL MEDICINE

## 2022-03-05 RX ORDER — INSULIN LISPRO 100 [IU]/ML
5 INJECTION, SOLUTION INTRAVENOUS; SUBCUTANEOUS
Status: DISCONTINUED | OUTPATIENT
Start: 2022-03-05 | End: 2022-03-08

## 2022-03-05 RX ORDER — PREDNISONE 20 MG/1
20 TABLET ORAL
Status: DISCONTINUED | OUTPATIENT
Start: 2022-03-06 | End: 2022-03-08

## 2022-03-05 RX ORDER — FUROSEMIDE 80 MG
80 TABLET ORAL
Status: DISCONTINUED | OUTPATIENT
Start: 2022-03-06 | End: 2022-03-06

## 2022-03-05 RX ORDER — SPIRONOLACTONE 25 MG/1
12.5 TABLET ORAL DAILY
Status: DISCONTINUED | OUTPATIENT
Start: 2022-03-05 | End: 2022-03-09 | Stop reason: HOSPADM

## 2022-03-05 RX ADMIN — INSULIN LISPRO 5 UNITS: 100 INJECTION, SOLUTION INTRAVENOUS; SUBCUTANEOUS at 17:21

## 2022-03-05 RX ADMIN — HYDRALAZINE HYDROCHLORIDE 25 MG: 25 TABLET, FILM COATED ORAL at 22:03

## 2022-03-05 RX ADMIN — FAMOTIDINE 20 MG: 20 TABLET ORAL at 08:38

## 2022-03-05 RX ADMIN — ROSUVASTATIN CALCIUM 5 MG: 20 TABLET, FILM COATED ORAL at 22:03

## 2022-03-05 RX ADMIN — SPIRONOLACTONE 12.5 MG: 25 TABLET ORAL at 12:28

## 2022-03-05 RX ADMIN — MONTELUKAST SODIUM 10 MG: 10 TABLET, FILM COATED ORAL at 08:37

## 2022-03-05 RX ADMIN — FUROSEMIDE 60 MG: 10 INJECTION, SOLUTION INTRAMUSCULAR; INTRAVENOUS at 19:39

## 2022-03-05 RX ADMIN — Medication 10 ML: at 19:39

## 2022-03-05 RX ADMIN — FUROSEMIDE 60 MG: 10 INJECTION, SOLUTION INTRAMUSCULAR; INTRAVENOUS at 11:16

## 2022-03-05 RX ADMIN — PREDNISONE 30 MG: 20 TABLET ORAL at 08:37

## 2022-03-05 RX ADMIN — HYDRALAZINE HYDROCHLORIDE 25 MG: 25 TABLET, FILM COATED ORAL at 05:27

## 2022-03-05 RX ADMIN — Medication 10 ML: at 11:17

## 2022-03-05 RX ADMIN — Medication 10 ML: at 08:38

## 2022-03-05 RX ADMIN — TAMSULOSIN HYDROCHLORIDE 0.4 MG: 0.4 CAPSULE ORAL at 22:03

## 2022-03-05 RX ADMIN — INSULIN LISPRO 2 UNITS: 100 INJECTION, SOLUTION INTRAVENOUS; SUBCUTANEOUS at 08:36

## 2022-03-05 RX ADMIN — METOPROLOL TARTRATE 12.5 MG: 25 TABLET, FILM COATED ORAL at 22:21

## 2022-03-05 RX ADMIN — ISOSORBIDE MONONITRATE 30 MG: 30 TABLET ORAL at 08:37

## 2022-03-05 RX ADMIN — GABAPENTIN 200 MG: 100 CAPSULE ORAL at 22:03

## 2022-03-05 RX ADMIN — FAMOTIDINE 20 MG: 20 TABLET ORAL at 22:03

## 2022-03-05 RX ADMIN — FUROSEMIDE 60 MG: 10 INJECTION, SOLUTION INTRAMUSCULAR; INTRAVENOUS at 03:27

## 2022-03-05 RX ADMIN — INSULIN LISPRO 2 UNITS: 100 INJECTION, SOLUTION INTRAVENOUS; SUBCUTANEOUS at 11:17

## 2022-03-05 RX ADMIN — INSULIN GLARGINE-YFGN 10 UNITS: 100 INJECTION, SOLUTION SUBCUTANEOUS at 22:17

## 2022-03-05 RX ADMIN — LEVOTHYROXINE SODIUM 25 MCG: 0.03 TABLET ORAL at 08:37

## 2022-03-05 RX ADMIN — METOPROLOL TARTRATE 12.5 MG: 25 TABLET, FILM COATED ORAL at 11:17

## 2022-03-05 RX ADMIN — AMLODIPINE BESYLATE 10 MG: 10 TABLET ORAL at 08:37

## 2022-03-05 RX ADMIN — HYDRALAZINE HYDROCHLORIDE 25 MG: 25 TABLET, FILM COATED ORAL at 14:41

## 2022-03-05 RX ADMIN — CYANOCOBALAMIN 1000 MCG: 1000 INJECTION, SOLUTION INTRAMUSCULAR at 08:38

## 2022-03-05 NOTE — CONSULTS
Patient Identification:  Helena Carmen  89 y.o.  female  2/20/1931  [id number]          LOS 3    Requesting physician: Dr Arnold    Reason for Consultation: Pulmonary hypertension    History of Present Illness:     89-year-old female with a history of diastolic CHF with aortic stenosis status post aortic valve replacement and pulmonary hypertension presented with increasing shortness of breath.  Patient denies chest pain, productive cough, fevers or chills.  Has had associated increased lower extremity edema and weight gain as well.  Being seen by cardiology and they are helping with management for CHF.  We have been consulted help with management of pulmonary hypertension.    Past Medical History:  Past Medical History:   Diagnosis Date   • Aortic valve stenosis     s/p tissue AVR   • Back pain    • Colitis due to Clostridioides difficile 1/26/2022   • Diastolic dysfunction     Grade 2 per echocardiogram 2013   • Diverticulosis    • Exertional shortness of breath    • Hiatal hernia    • Hyperlipidemia    • Hypertension    • Hyperthyroidism    • Hypertriglyceridemia 5/31/2018   • Hypothyroidism    • Left ventricular hypertrophy    • Legally blind    • Macular degeneration    • Mitral regurgitation    • Osteoarthritis of hip    • Pancreatitis 1/26/2022   • Paroxysmal atrial fibrillation (HCC)    • Premature ventricular contractions    • Pulmonary hypertension (HCC)    • Renal insufficiency syndrome    • Type 2 diabetes mellitus (HCC)        Past Surgical History:  Past Surgical History:   Procedure Laterality Date   • AORTIC VALVE REPAIR/REPLACEMENT     • CATARACT EXTRACTION      1970, 1999   • ENDOSCOPY  08/15/2014    no gross lesions in stomach/duodenum, erythrematous mucosa in stomach   • HYSTERECTOMY  2007   • STERNOTOMY          Home Meds:  Medications Prior to Admission   Medication Sig Dispense Refill Last Dose   • amLODIPine (NORVASC) 10 MG tablet Take 1 tablet by mouth daily.   3/2/2022 at Unknown time    • aspirin 81 MG tablet Take by mouth daily.   Past Week at Unknown time   • fenofibrate (TRICOR) 145 MG tablet Take 1 tablet by mouth daily.   3/2/2022 at Unknown time   • Ferrous Sulfate (IRON) 325 (65 FE) MG tablet Take 1 tablet by mouth Daily.   3/2/2022 at Unknown time   • furosemide (LASIX) 40 MG tablet Take 1 tablet by mouth Daily. 30 tablet 0 3/2/2022 at Unknown time   • gabapentin (NEURONTIN) 600 MG tablet Take 1 tablet by mouth 2 (two) times a day.   3/2/2022 at Unknown time   • glyBURIDE-metFORMIN (GLUCOVANCE) 5-500 MG per tablet Take 2 tablets by mouth 2 (two) times a day.   3/2/2022 at Unknown time   • hydrALAZINE (APRESOLINE) 100 MG tablet Take 1 tablet by mouth 2 (Two) Times a Day. 60 tablet 0 3/2/2022 at Unknown time   • insulin glargine (LANTUS) 100 UNIT/ML injection Inject 12 Units under the skin into the appropriate area as directed Daily.   3/1/2022 at Unknown time   • isosorbide mononitrate (IMDUR) 120 MG 24 hr tablet Take 120 mg by mouth Daily.   3/1/2022 at Unknown time   • levothyroxine (SYNTHROID) 25 MCG tablet Take 1 tablet by mouth daily.   3/2/2022 at Unknown time   • linagliptin (TRADJENTA) 5 MG tablet tablet Take 5 mg by mouth daily.   3/2/2022 at Unknown time   • loratadine (CLARITIN) 10 MG tablet Take 1 tablet by mouth daily.   3/2/2022 at Unknown time   • LORazepam (ATIVAN) 0.5 MG tablet Take 1 tablet by mouth as needed.   3/1/2022 at Unknown time   • metoclopramide (REGLAN) 10 MG tablet Take 1 tablet by mouth 3 (Three) Times a Day Before Meals. 270 tablet 3 3/2/2022 at Unknown time   • metoprolol tartrate (LOPRESSOR) 50 MG tablet Take 1 tablet by mouth every 12 (twelve) hours.   3/2/2022 at Unknown time   • montelukast (SINGULAIR) 10 MG tablet Take 1 tablet by mouth daily.   3/2/2022 at Unknown time   • Multiple Vitamin tablet Take 1 tablet by mouth daily.   3/1/2022 at Unknown time   • pantoprazole (PROTONIX) 40 MG EC tablet Take 40 mg by mouth Daily.   3/2/2022 at Unknown time  "  • rosuvastatin (CRESTOR) 20 MG tablet Take 20 mg by mouth Daily.   3/1/2022 at Unknown time   • silodosin (Rapaflo) 4 MG capsule capsule Take 4 mg by mouth Daily With Breakfast.   3/1/2022 at Unknown time   • Vitamin E 400 UNITS tablet Take 1 tablet by mouth daily.   3/1/2022 at Unknown time         Allergies:  Allergies   Allergen Reactions   • Erythromycin Unknown (See Comments)     Pt states she does not remember but it was many years ago   • Keflex [Cephalexin] Unknown (See Comments)     Pt states she does not remember reaction but it was many years ago    • Penicillins Rash   • Sulfa Antibiotics Itching and Rash       Social History:   Social History     Socioeconomic History   • Marital status:    Tobacco Use   • Smoking status: Former Smoker     Packs/day: 0.50     Quit date: 7/10/1969     Years since quittin.6   • Smokeless tobacco: Never Used   Substance and Sexual Activity   • Alcohol use: No     Comment: caffeine use - coffee 2 cups daily   • Drug use: No       Family History:  Family History   Problem Relation Age of Onset   • Heart disease Mother    • Hypertension Mother    • Stroke Mother    • Diabetes Mother         mellitus   • Other Other         cardiovascular disorder       Review of Systems:  Denies fevers or chills  Denies nausea or vomiting  No new vision or hearing changes  No chest pain  shortness of breath  No diarrhea, hematemesis or hematochezia, no dysuria or frequency  No musculoskeletal complaints  No heat or cold intolerance  No skin rashes  No dizziness or confusion.  No seizure activity  No new anxiety or depression  12 system review of systems performed and all else negative    Objective:    PHYSICAL EXAM:    /53 (BP Location: Left arm, Patient Position: Lying)   Pulse 61   Temp 97.8 °F (36.6 °C) (Oral)   Resp 18   Ht 144.8 cm (57\")   Wt 73.1 kg (161 lb 3.2 oz)   SpO2 95%   BMI 34.88 kg/m²  Body mass index is 34.88 kg/m². 95% 73.1 kg (161 lb 3.2 " oz)    GENERAL APPEARANCE:   · Well developed  · Well nourished  · No acute distress   EYES:    · PERRL                                                                           · Conjunctivae normal  · Sclerae nonicteric.  HENT:   · Atraumatic, normocephalic  · External ears and nose normal  · Moist mucous membranes and no ulcers  NECK:  · Thyroid not enlarged  · Trachea midline   RESPIRATORY:    · Nonlabored breathing   · Normal breath sounds  · Mild bibasilar rales. No wheezing  · No dullness  CARDIOVASCULAR:    · RRR  · Normal S1, S2  · No murmur  · Lower extremity edema: +    GI:   · Bowel sounds normal  · Abdomen soft , nondistended, nontender  · No abdominal masses  MUSCULOSKELETAL:  · Normal movement of extremities  · No tenderness, no deformities  · No clubbing or cyanosis   Skin:    · No visible rashes  · No palpable nodules  · Cap refill normal.  No mottling.   PSYCHIATRIC:  · Speech and behavior appropriate  · Normal mood and affect  · Oriented to person, place and time  NEUROLOGIC:  Cranial nerves II through XII grossly intact.  Sensation intact.      Lab Review:                                    Results    Collected Updated Procedure Result Status    03/05/2022 0544 03/05/2022 0719 Basic Metabolic Panel [[id number]]    (Abnormal)   Blood    Final result Component Value Units   Glucose 213 High  mg/dL   BUN 68 High  mg/dL   Creatinine 2.70 High  mg/dL   Sodium 136 mmol/L   Potassium 3.8 mmol/L   Chloride 92 Low  mmol/L   CO2 29.0 mmol/L   Calcium 9.8 High  mg/dL   BUN/Creatinine Ratio 25.2 High     Anion Gap 15.0 mmol/L   eGFR 16.2 Low   mL/min/1.73          03/05/2022 0544 03/05/2022 0759 Immature Platelet Fraction [[id number]]   (Abnormal)   Blood    Final result Component Value Units   IPF 11.70 High  %   Platelets 59 Low  10*3/mm3          03/05/2022 0544 03/05/2022 0719 Lactate Dehydrogenase [[id number]]   (Abnormal)   Blood    Final result Component Value Units    High  U/L           03/05/2022 0544 03/05/2022 0759 CBC Auto Differential [[id number]]   (Abnormal)   Blood    Final result Component Value Units   WBC 7.44 10*3/mm3   RBC 3.96 10*6/mm3   Hemoglobin 11.8 Low  g/dL   Hematocrit 36.4 %   MCV 91.9 fL   MCH 29.8 pg   MCHC 32.4 g/dL   RDW 14.3 %   RDW-SD 48.2 fl   MPV 13.5 High  fL   Platelets 59 Low  10*3/mm3   Neutrophil % 67.5 %   Lymphocyte % 20.2 %   Monocyte % 11.4 %   Eosinophil % 0.3 %   Basophil % 0.3 %   Immature Grans % 0.3 %   Neutrophils, Absolute 5.03 10*3/mm3   Lymphocytes, Absolute 1.50 10*3/mm3   Monocytes, Absolute 0.85 10*3/mm3   Eosinophils, Absolute 0.02 10*3/mm3   Basophils, Absolute 0.02 10*3/mm3   Immature Grans, Absolute 0.02 10*3/mm3   nRBC 0.0 /100 WBC          03/04/2022 0645 03/04/2022 0739 Uric Acid [[id number]]   (Abnormal)   Blood    Final result Component Value Units   Uric Acid 8.9 High  mg/dL          03/04/2022 0645 03/04/2022 0739 Comprehensive Metabolic Panel [[id number]]    (Abnormal)   Blood    Final result Component Value Units   Glucose 159 High  mg/dL   BUN 62 High  mg/dL   Creatinine 2.70 High  mg/dL   Sodium 139 mmol/L   Potassium 4.1 mmol/L   Chloride 96 Low  mmol/L   CO2 29.0 mmol/L   Calcium 9.9 High  mg/dL   Total Protein 7.2 g/dL   Albumin 4.00 g/dL   ALT (SGPT) 16 U/L   AST (SGOT) 21 U/L   Alkaline Phosphatase 32 Low  U/L   Total Bilirubin 0.7 mg/dL   Globulin 3.2 gm/dL   A/G Ratio 1.3 g/dL   BUN/Creatinine Ratio 23.0    Anion Gap 14.0 mmol/L   eGFR 16.2 Low   mL/min/1.73          03/04/2022 0645 03/04/2022 0742 TSH [[id number]]   Blood    Final result Component Value Units   TSH 0.936 uIU/mL          03/04/2022 0645 03/04/2022 0739 Lipid Panel [[id number]]    (Abnormal)   Blood    Final result Component Value Units   Total Cholesterol 114 mg/dL   Triglycerides 148 mg/dL   HDL Cholesterol 39 Low  mg/dL   LDL Cholesterol  50 mg/dL   VLDL Cholesterol 25 mg/dL   LDL/HDL Ratio 1.16           03/04/2022 0645 03/04/2022 0725 Hemoglobin A1c  [[id number]]    (Abnormal)   Blood    Final result Component Value Units   Hemoglobin A1C 6.70 High  %          03/04/2022 0645 03/04/2022 0801 Troponin [[id number]]    (Abnormal)   Blood    Final result Component Value Units   Troponin T 0.034 High Critical  ng/mL          03/04/2022 0645 03/04/2022 0752 Vitamin B12 [[id number]]    Blood    Final result Component Value Units   Vitamin B-12 238 pg/mL          03/04/2022 0645 03/04/2022 0752 Folate [[id number]]    Blood    Final result Component Value Units   Folate >20.00 ng/mL          03/04/2022 0645 03/04/2022 0657 MARINE,PE and FLC, Serum [[id number]]   Blood    In process Component Value   No component results          03/04/2022 0645 03/04/2022 0739 Lactate Dehydrogenase [[id number]]   (Abnormal)   Blood    Final result Component Value Units    High  U/L          03/04/2022 0645 03/04/2022 0712 CBC Auto Differential [[id number]]   (Abnormal)   Blood    Final result Component Value Units   WBC 6.91 10*3/mm3   RBC 4.02 10*6/mm3   Hemoglobin 12.0 g/dL   Hematocrit 38.1 %   MCV 94.8 fL   MCH 29.9 pg   MCHC 31.5 g/dL   RDW 14.3 %   RDW-SD 49.7 fl   MPV 12.1 High  fL   Platelets 44 Low Critical  10*3/mm3   Neutrophil % 67.2 %   Lymphocyte % 19.5 Low  %   Monocyte % 11.1 %   Eosinophil % 1.4 %   Basophil % 0.4 %   Neutrophils, Absolute 4.63 10*3/mm3   Lymphocytes, Absolute 1.35 10*3/mm3   Monocytes, Absolute 0.77 10*3/mm3   Eosinophils, Absolute 0.10 10*3/mm3   Basophils, Absolute 0.03 10*3/mm3                   Imaging reviewed  chest X-ray 3/2 reviewed showed small right pleural effusion and basilar atelectasis.       2D echo 12/17/2021 reviewed: EF 68% bioprosthetic aortic valve noted.  Elevated peak aortic valve gradient.  Tricuspid regurgitation noted with mild elevation of 35 to 45 mmHg estimated.          Assessment:    Pulmonary hypertension: WHO group II  Acute on chronic diastolic CHF  Aortic valve replacement  Type 2  diabetes  Hypertension          Recommendations:    Patient's pulmonary hypertension is secondary to left-sided disease and post capillary pulmonary hypertension.  No indication for right heart catheterization in this patient.  No indication for pulmonary arterial hypertensive medications.  Mainstay treatment as blood pressure control and diuretics for CHF.  Wean oxygen as able.  Discussed with family at bedside and questions answered to their satisfaction.    Thank you for allowing me to participate in the care of this patient.  I will continue to follow along with you.      Juancarlos Siddiqui MD  [facility], [facility]  Pulmonary and Critical Care Medicine    3/5/2022  13:44 EST

## 2022-03-05 NOTE — PROGRESS NOTES
"Daily progress note    Chief complaint  Doing same  No new complaints  Wants to see her pulmonologist  Denies any chest pain shortness of breath palpitation  Family at bedside    History of present illness  89-year-old white female with history of diastolic CHF hypertension aortic stenosis s/p AVR pulmonary hypertension atrial fibrillation and chronic kidney disease stage IV presented to [facility] emergency room with increased shortness of breath generalized weakness leg swelling and weight gain.  Patient also have abdominal pain but no nausea vomiting diarrhea.  Patient denies any chest pain fever chills cough.  Patient work-up in ER revealed acute kidney injury and decompression CHF admit for management.  Patient is full code.    REVIEW OF SYSTEMS  All systems reviewed and negative except for those discussed in HPI.      PHYSICAL EXAM   Blood pressure 149/53, pulse 61, temperature 97.8 °F (36.6 °C), temperature source Oral, resp. rate 18, height 144.8 cm (57\"), weight 73.1 kg (161 lb 3.2 oz), SpO2 95 %.    GENERAL: Awake and alert, nontoxic-appearing  HENT: nares patent  EYES: no scleral icterus  CV: regular rhythm, regular rate  RESPIRATORY: Mild increased work of breathing with crackles in the bases  ABDOMEN: soft, nontender mildly distended bowel sounds positive  MUSCULOSKELETAL: no deformity, 1+ pedal edema in both legs into the thigh  NEURO: alert, moves all extremities, follows commands  SKIN: warm, dry     LAB RESULTS  Lab Results (last 24 hours)     Procedure Component Value Units Date/Time    POC Glucose Once [[id number]]  (Abnormal) Collected: 03/05/22 1103    Specimen: Blood Updated: 03/05/22 1105     Glucose 181 mg/dL      Comment: Meter: [id number] : [id number] Cm JOHNSON       CBC & Differential [[id number]]  (Abnormal) Collected: 03/05/22 0544    Specimen: Blood Updated: 03/05/22 0986    Narrative:      The following orders were created for panel order CBC & Differential.  Procedure "                               Abnormality         Status                     ---------                               -----------         ------                     CBC Auto Differential[[id number]]        Abnormal            Final result                 Please view results for these tests on the individual orders.    Immature Platelet Fraction [[id number]]  (Abnormal) Collected: 03/05/22 0544    Specimen: Blood Updated: 03/05/22 0759     IPF 11.70 %      Platelets 59 10*3/mm3     CBC Auto Differential [[id number]]  (Abnormal) Collected: 03/05/22 0544    Specimen: Blood Updated: 03/05/22 0759     WBC 7.44 10*3/mm3      RBC 3.96 10*6/mm3      Hemoglobin 11.8 g/dL      Hematocrit 36.4 %      MCV 91.9 fL      MCH 29.8 pg      MCHC 32.4 g/dL      RDW 14.3 %      RDW-SD 48.2 fl      MPV 13.5 fL      Platelets 59 10*3/mm3      Neutrophil % 67.5 %      Lymphocyte % 20.2 %      Monocyte % 11.4 %      Eosinophil % 0.3 %      Basophil % 0.3 %      Immature Grans % 0.3 %      Neutrophils, Absolute 5.03 10*3/mm3      Lymphocytes, Absolute 1.50 10*3/mm3      Monocytes, Absolute 0.85 10*3/mm3      Eosinophils, Absolute 0.02 10*3/mm3      Basophils, Absolute 0.02 10*3/mm3      Immature Grans, Absolute 0.02 10*3/mm3      nRBC 0.0 /100 WBC     Basic Metabolic Panel [[id number]]  (Abnormal) Collected: 03/05/22 0544    Specimen: Blood Updated: 03/05/22 0719     Glucose 213 mg/dL      BUN 68 mg/dL      Creatinine 2.70 mg/dL      Sodium 136 mmol/L      Potassium 3.8 mmol/L      Chloride 92 mmol/L      CO2 29.0 mmol/L      Calcium 9.8 mg/dL      BUN/Creatinine Ratio 25.2     Anion Gap 15.0 mmol/L      eGFR 16.2 mL/min/1.73      Comment: National Kidney Foundation and American Society of Nephrology (ASN) Task Force recommended calculation based on the Chronic Kidney Disease Epidemiology Collaboration (CKD-EPI) equation refit without adjustment for race.       Narrative:      GFR Normal >60  Chronic Kidney Disease <60  Kidney Failure <15       Lactate Dehydrogenase [[id number]]  (Abnormal) Collected: 03/05/22 0544    Specimen: Blood Updated: 03/05/22 0719      U/L     POC Glucose Once [[id number]]  (Abnormal) Collected: 03/05/22 0614    Specimen: Blood Updated: 03/05/22 0616     Glucose 215 mg/dL      Comment: Meter: [id number] : [id number] Conrado Marta NA       POC Glucose Once [[id number]]  (Abnormal) Collected: 03/04/22 2040    Specimen: Blood Updated: 03/05/22 0316     Glucose 319 mg/dL      Comment: Meter: [id number] : [id number] Conrado Marta NA       POC Glucose Once [[id number]]  (Abnormal) Collected: 03/04/22 1622    Specimen: Blood Updated: 03/05/22 0309     Glucose 266 mg/dL      Comment: Meter: [id number] : [id number] [facility]           Imaging Results (Last 24 Hours)     ** No results found for the last 24 hours. **             ECG 12 Lead  Component   Ref Range & Units 3/2/22 1559   QT Interval   ms 449              HEART RATE= 50  bpm  RR Interval= 1196  ms  CO Interval= 170  ms  P Horizontal Axis= 36  deg  P Front Axis= 60  deg  QRSD Interval= 109  ms  QT Interval= 449  ms  QRS Axis= 64  deg  T Wave Axis= 58  deg  - OTHERWISE NORMAL ECG -  Sinus rhythm  Low voltage, precordial leads  Rate slower             Current Facility-Administered Medications:   •  acetaminophen (TYLENOL) tablet 1,000 mg, 1,000 mg, Oral, Q6H PRN, Mango Arnold MD, 1,000 mg at 03/03/22 2101  •  amLODIPine (NORVASC) tablet 10 mg, 10 mg, Oral, Daily, Mango Arnold MD, 10 mg at 03/05/22 0837  •  cyanocobalamin injection 1,000 mcg, 1,000 mcg, Intramuscular, Daily, Tera Paredes MD PhD, 1,000 mcg at 03/05/22 0838  •  dextrose (D50W) (25 g/50 mL) IV injection 25 g, 25 g, Intravenous, Q15 Min PRN, Mango Arnold MD  •  dextrose (GLUTOSE) oral gel 15 g, 15 g, Oral, Q15 Min PRN, Mango Arnold MD  •  famotidine (PEPCID) tablet 20 mg, 20 mg, Oral, BID, Mango Arnold MD, 20 mg at 03/05/22 0838  •  furosemide (LASIX) injection 60 mg, 60 mg,  Intravenous, Q8H, Jordan Vázquez MD, 60 mg at 03/05/22 1116  •  gabapentin (NEURONTIN) capsule 200 mg, 200 mg, Oral, Nightly, Jordan Vázquez MD, 200 mg at 03/04/22 2119  •  glucagon (human recombinant) (GLUCAGEN DIAGNOSTIC) injection 1 mg, 1 mg, Intramuscular, Q15 Min PRN, Mango Arnold MD  •  hydrALAZINE (APRESOLINE) tablet 25 mg, 25 mg, Oral, Q8H, Jordan Vázquez MD, 25 mg at 03/05/22 0527  •  insulin lispro (ADMELOG) injection 0-7 Units, 0-7 Units, Subcutaneous, TID AC, Mango Arnold MD, 2 Units at 03/05/22 1117  •  isosorbide mononitrate (IMDUR) 24 hr tablet 30 mg, 30 mg, Oral, Daily, Mango Arnold MD, 30 mg at 03/05/22 0837  •  levothyroxine (SYNTHROID, LEVOTHROID) tablet 25 mcg, 25 mcg, Oral, Daily, Mango Arnold MD, 25 mcg at 03/05/22 0837  •  LORazepam (ATIVAN) tablet 0.5 mg, 0.5 mg, Oral, Q6H PRN, Mango Arnold MD, 0.5 mg at 03/03/22 2058  •  magnesium sulfate 2g/50 mL (PREMIX) infusion, 2 g, Intravenous, Once, Mango Arnold MD  •  metoprolol tartrate (LOPRESSOR) tablet 12.5 mg, 12.5 mg, Oral, Q12H, Mango Arnold MD, 12.5 mg at 03/05/22 1117  •  montelukast (SINGULAIR) tablet 10 mg, 10 mg, Oral, Daily, Mango Arnold MD, 10 mg at 03/05/22 0837  •  predniSONE (DELTASONE) tablet 30 mg, 30 mg, Oral, Daily With Breakfast, Tera Paredes MD PhD, 30 mg at 03/05/22 0837  •  rosuvastatin (CRESTOR) tablet 5 mg, 5 mg, Oral, Nightly, Mango Arnold MD, 5 mg at 03/04/22 2119  •  [COMPLETED] Insert peripheral IV, , , Once **AND** sodium chloride 0.9 % flush 10 mL, 10 mL, Intravenous, PRN, Tomás Garnett MD, 10 mL at 03/05/22 1117  •  spironolactone (ALDACTONE) tablet 12.5 mg, 12.5 mg, Oral, Daily, Beth Butcher MD, 12.5 mg at 03/05/22 1228  •  tamsulosin (FLOMAX) 24 hr capsule 0.4 mg, 0.4 mg, Oral, Nightly, Mango Arnold MD, 0.4 mg at 03/04/22 2119     ASSESSMENT  Acute on chronic diastolic CHF  Acute kidney injury  Chronic kidney disease stage IV  Paroxysmal atrial  fibrillation  Aortic stenosis s/p AVR  Diabetes mellitus  Hypertension  Hyperlipidemia  Thrombocytopenia  Pulmonary hypertension  Gastroesophageal reflux disease    PLAN  CPM  IV diuresis  Strict I's and O's and daily weight  Nephrology consult appreciated  Cardiology pulmonary and hematology to follow patient  Adjust home medications  Stress ulcer DVT prophylaxis  Supportive care  PT/OT  DNR  Discussed with nursing staff  Follow closely and further recommendation according to hospital course    KADEEM RIVAS MD

## 2022-03-05 NOTE — PLAN OF CARE
Goal Outcome Evaluation:  Plan of Care Reviewed With: patient, daughter           Outcome Evaluation: Denies pain or SOA. 02 2L NC. Bladder scan greater >999 and F/C inserted per Dr. Vázquez's order. Obtained 1550 total urine. (250 in purewick cannister). IV Lasix and Aldactone given. Blood sugars noted and treated with SS insulin. Telemetry SB w/ BBB.

## 2022-03-05 NOTE — PLAN OF CARE
Goal Outcome Evaluation:            Pt. Alert, oriented x4, with external catheter in placed, being monitored for urinary retention, bladder scan done, no residual noted at this shift, no voiced c/o pain, v/s stable, 02 sats kept over 90% with 02 inhal. @2l/m per n/c, no s/s acute distress noted

## 2022-03-05 NOTE — PROGRESS NOTES
[facility] Progress Note      Patient Name: Helena Carmen  : 1931  MRN: [id number]  Primary Care Physician:  Mariah Hickman MD  Date of admission: 3/2/2022    Subjective     Interval History:   Breathing is more comfortable than yesterday; she feels less swollen  3.6 L UOP yesterday, and 1.2 L so far today; on 60 mg furosemide IV q8h  Remains hypertensive, but overall trend is better (140s/50s now)    Review of Systems:   As noted above    Objective     Vitals:   Temp:  [97.5 °F (36.4 °C)-98.5 °F (36.9 °C)] 98.1 °F (36.7 °C)  Heart Rate:  [55-66] 58  Resp:  [16-18] 16  BP: (145-172)/(52-61) 145/53  Flow (L/min):  [2] 2    Intake/Output Summary (Last 24 hours) at 3/4/2022 190  Last data filed at 3/4/2022 1034  Gross per 24 hour   Intake --   Output 3775 ml   Net -3775 ml       Physical Exam:    Constitutional: Awake, NAD, oriented fully; looks younger than stated age  HEENT: Sclera anicteric, no conjunctival injection  Neck: Supple, trachea at midline, bilateral carotid bruits  Respiratory: Mostly clear, nonlabored respiration on 2 L/min  Cardiovascular: RRR, 2/6M, no rub  Gastrointestinal: BS +, soft, nontender, + distended  : No palpable bladder; external urinary catheter  Musculoskeletal: +1 edema extending to hips, no clubbing or cyanosis  Psychiatric: Flat affect; cooperative; fully oriented  Neurologic: moving all extremities, normal speech and mental status  Skin: Warm and dry     Scheduled Meds:     amLODIPine, 10 mg, Oral, Daily  bisacodyl, 10 mg, Rectal, Once  cyanocobalamin, 1,000 mcg, Intramuscular, Daily  famotidine, 20 mg, Oral, BID  furosemide, 60 mg, Intravenous, Q8H  gabapentin, 200 mg, Oral, Nightly  insulin lispro, 0-7 Units, Subcutaneous, TID AC  isosorbide mononitrate, 30 mg, Oral, Daily  levothyroxine, 25 mcg, Oral, Daily  magnesium sulfate, 2 g, Intravenous, Once  metoprolol tartrate, 12.5 mg, Oral, Q12H  montelukast, 10 mg, Oral,  Daily  predniSONE, 30 mg, Oral, Daily With Breakfast  rosuvastatin, 5 mg, Oral, Nightly  tamsulosin, 0.4 mg, Oral, Nightly      IV Meds:        Results Reviewed:   I have personally reviewed the results from the time of this admission to 3/4/2022 19:07 EST     Results from last 7 days   Lab Units 03/04/22  0645 03/03/22  1158 03/02/22  1632   SODIUM mmol/L 139 137 131*   POTASSIUM mmol/L 4.1 4.4 4.8   CHLORIDE mmol/L 96* 98 94*   CO2 mmol/L 29.0 26.0 21.1*   BUN mg/dL 62* 56* 63*   CREATININE mg/dL 2.70* 2.68* 2.79*   CALCIUM mg/dL 9.9* 9.4 9.2   BILIRUBIN mg/dL 0.7  --  0.3   ALK PHOS U/L 32*  --  31*   ALT (SGPT) U/L 16  --  20   AST (SGOT) U/L 21  --  27   GLUCOSE mg/dL 159* 167* 172*       Estimated Creatinine Clearance: 13.2 mL/min (A) (by C-G formula based on SCr of 2.7 mg/dL (H)).    Results from last 7 days   Lab Units 03/03/22  1158 03/02/22  1632   MAGNESIUM mg/dL 1.9 1.5*   PHOSPHORUS mg/dL 4.0  --        Results from last 7 days   Lab Units 03/04/22  0645   URIC ACID mg/dL 8.9*       Results from last 7 days   Lab Units 03/04/22  0645 03/03/22  1531 03/02/22  1632   WBC 10*3/mm3 6.91  --  5.74   HEMOGLOBIN g/dL 12.0  --  12.2   PLATELETS 10*3/mm3 44* 38* 39*             Assessment / Plan     ASSESSMENT:  1.  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  Volume excess, improving;  electrolytes stable with resolving hypervolemic hyponatremia with diuresis.   2.  CHF, decompensated; seems more right-sided  3.  Hypertension, exacerbated by hypervolemia, better with diuresis  4.  Multi-valvular heart disease with prior AVR  5.  DM2    PLAN:  1. Continue IV furosemide 60 mg q8h, though transition to oral regimen soon  2. Add hydralazine 25 mg TID    Thank you for involving us in the care of Helena Carmen.  Please feel free to call with any questions.    Jordan Vázquez MD  03/04/22  19:07 [facility]    [facility]  [phone]      Much of this encounter note is an electronic transcription/translation of spoken language to printed text. The electronic translation of spoken language may permit erroneous, or at times, nonsensical words or phrases to be inadvertently transcribed; Although I have reviewed the note for such errors, some may still exist.

## 2022-03-05 NOTE — PROGRESS NOTES
[facility] Progress Note      Patient Name: Helena Carmen  : 1931  MRN: [id number]  Primary Care Physician:  Mariah Hickman MD  Date of admission: 3/2/2022    Subjective     Interval History:   Breathing is more comfortable than yesterday; she feels less swollen  1.1 L UOP yesterday, and 1.8 L so far today; on 60 mg furosemide IV q8h.   Down 8L this admission; spironolactone added by cardiology.       Review of Systems:   As noted above    Objective     Vitals:   Temp:  [97.8 °F (36.6 °C)-98.5 °F (36.9 °C)] 98.5 °F (36.9 °C)  Heart Rate:  [57-68] 57  Resp:  [16-18] 18  BP: (128-152)/(53-76) 128/76  Flow (L/min):  [2] 2    Intake/Output Summary (Last 24 hours) at 3/5/2022 1649  Last data filed at 3/5/2022 1228  Gross per 24 hour   Intake --   Output 1800 ml   Net -1800 ml       Physical Exam:    Constitutional: Awake, NAD, oriented fully; looks younger than stated age  HEENT: Sclera anicteric, no conjunctival injection  Neck: Supple, trachea at midline, bilateral carotid bruits  Respiratory: Mostly clear, nonlabored respiration on 2 L/min  Cardiovascular: RR, valentín, 2/6M, no rub  Gastrointestinal: BS +, soft, nontender, +distended  : No palpable bladder; external urinary catheter  Musculoskeletal: +1 edema extending to hips, no clubbing or cyanosis  Psychiatric: Flat affect; cooperative; fully oriented  Neurologic: moving all extremities, normal speech and mental status  Skin: Warm and dry     Scheduled Meds:     amLODIPine, 10 mg, Oral, Daily  cyanocobalamin, 1,000 mcg, Intramuscular, Daily  famotidine, 20 mg, Oral, BID  furosemide, 60 mg, Intravenous, Q8H  gabapentin, 200 mg, Oral, Nightly  hydrALAZINE, 25 mg, Oral, Q8H  insulin glargine, 10 Units, Subcutaneous, Nightly  insulin lispro, 0-7 Units, Subcutaneous, TID AC  insulin lispro, 5 Units, Subcutaneous, TID With Meals  isosorbide mononitrate, 30 mg, Oral, Daily  levothyroxine, 25 mcg, Oral, Daily  metoprolol  tartrate, 12.5 mg, Oral, Q12H  montelukast, 10 mg, Oral, Daily  [START ON 3/6/2022] predniSONE, 20 mg, Oral, Daily With Breakfast  rosuvastatin, 5 mg, Oral, Nightly  spironolactone, 12.5 mg, Oral, Daily  tamsulosin, 0.4 mg, Oral, Nightly      IV Meds:        Results Reviewed:   I have personally reviewed the results from the time of this admission to 3/5/2022 16:49 EST     Results from last 7 days   Lab Units 03/05/22  0544 03/04/22  0645 03/03/22  1158 03/02/22  1632   SODIUM mmol/L 136 139 137 131*   POTASSIUM mmol/L 3.8 4.1 4.4 4.8   CHLORIDE mmol/L 92* 96* 98 94*   CO2 mmol/L 29.0 29.0 26.0 21.1*   BUN mg/dL 68* 62* 56* 63*   CREATININE mg/dL 2.70* 2.70* 2.68* 2.79*   CALCIUM mg/dL 9.8* 9.9* 9.4 9.2   BILIRUBIN mg/dL  --  0.7  --  0.3   ALK PHOS U/L  --  32*  --  31*   ALT (SGPT) U/L  --  16  --  20   AST (SGOT) U/L  --  21  --  27   GLUCOSE mg/dL 213* 159* 167* 172*       Estimated Creatinine Clearance: 12.1 mL/min (A) (by C-G formula based on SCr of 2.7 mg/dL (H)).    Results from last 7 days   Lab Units 03/03/22  1158 03/02/22  1632   MAGNESIUM mg/dL 1.9 1.5*   PHOSPHORUS mg/dL 4.0  --        Results from last 7 days   Lab Units 03/04/22  0645   URIC ACID mg/dL 8.9*       Results from last 7 days   Lab Units 03/05/22  0544 03/04/22  0645 03/03/22  1531 03/02/22  1632   WBC 10*3/mm3 7.44 6.91  --  5.74   HEMOGLOBIN g/dL 11.8* 12.0  --  12.2   PLATELETS 10*3/mm3 59*  59* 44* 38* 39*             Assessment / Plan     ASSESSMENT:  1.  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  Volume excess, improving;  electrolytes stable with resolving hypervolemic hyponatremia with diuresis.   2.  CHF, decompensated; seems more right-sided  3.  Hypertension, exacerbated by hypervolemia, better with diuresis. Spironolactone added 3/5/22.  4.  Multi-valvular heart disease with prior AVR  5.  DM2    PLAN:  1.  Transition tomorrow to oral furosemide 80 mg twice daily  2.   Surveillance labs.  3.  Renal ultrasound ordered (was requested by her outpatient primary nephrologist)    Thank you for involving us in the care of Helena Carmen.  Please feel free to call with any questions.    Sima Blum, RIGO  03/05/22  16:49 EST     I examined this patient, reviewed the data, and personally edited this note.  I agree with the assessment and plan as outlined above.  --Jordan Vázquez MD  03/05/22  20:12 EST       [facility]  [phone]      Much of this encounter note is an electronic transcription/translation of spoken language to printed text. The electronic translation of spoken language may permit erroneous, or at times, nonsensical words or phrases to be inadvertently transcribed; Although I have reviewed the note for such errors, some may still exist.

## 2022-03-05 NOTE — THERAPY EVALUATION
Patient Name: Helena Carmen  : 1931    MRN: [id number]                              Today's Date: 3/5/2022       Admit Date: 3/2/2022    Visit Dx:     ICD-10-CM ICD-9-CM   1. Acute on chronic combined systolic and diastolic congestive heart failure (HCC)  I50.43 428.43     428.0   2. Chronic kidney disease, unspecified CKD stage  N18.9 585.9     Patient Active Problem List   Diagnosis   • Aortic valve stenosis   • Diastolic dysfunction   • Fatigue   • MI (mitral incompetence)   • PVC (premature ventricular contraction)   • Legally blind   • Essential hypertension   • Hypertriglyceridemia   • Pulmonary hypertension (HCC)   • Paroxysmal atrial fibrillation (HCC)   • Breast screening   • Abdominal pain, right lower quadrant   • Allergic rhinitis   • Anxiety   • Chronic kidney disease   • Chronic pain disorder   • Degeneration of lumbar intervertebral disc   • Diabetes mellitus (HCC)   • Esophageal reflux   • Gastroparesis   • Hiatal hernia   • Iatrogenic hypothyroidism   • Long term (current) use of opiate analgesic   • Macular degeneration   • Malignant neoplasm of uterus (HCC)   • Osteoarthritis of knee   • Peripheral nerve disease   • Secondary hyperparathyroidism (HCC)   • Thrombocytopenia (HCC)   • Mitral valve regurgitation   • History of aortic valve replacement   • Nonrheumatic tricuspid valve regurgitation   • Elevated serum creatinine   • Chronic diastolic congestive heart failure (HCC)   • CHF (congestive heart failure) (HCC)   • Other cirrhosis of liver (HCC)     Past Medical History:   Diagnosis Date   • Aortic valve stenosis     s/p tissue AVR   • Back pain    • Colitis due to Clostridioides difficile 2022   • Diastolic dysfunction     Grade 2 per echocardiogram    • Diverticulosis    • Exertional shortness of breath    • Hiatal hernia    • Hyperlipidemia    • Hypertension    • Hyperthyroidism    • Hypertriglyceridemia 2018   • Hypothyroidism    • Left ventricular hypertrophy    •  Legally blind    • Macular degeneration    • Mitral regurgitation    • Osteoarthritis of hip    • Pancreatitis 1/26/2022   • Paroxysmal atrial fibrillation (HCC)    • Premature ventricular contractions    • Pulmonary hypertension (HCC)    • Renal insufficiency syndrome    • Type 2 diabetes mellitus (HCC)      Past Surgical History:   Procedure Laterality Date   • AORTIC VALVE REPAIR/REPLACEMENT     • CATARACT EXTRACTION      1970, 1999   • ENDOSCOPY  08/15/2014    no gross lesions in stomach/duodenum, erythrematous mucosa in stomach   • HYSTERECTOMY  2007   • STERNOTOMY        General Information     Row Name 03/05/22 1225          Physical Therapy Time and Intention    Document Type evaluation;therapy note (daily note)  -     Mode of Treatment individual therapy;physical therapy  -     Row Name 03/05/22 1225          General Information    Patient Profile Reviewed yes  -     Prior Level of Function independent:;all household mobility;community mobility  -     Existing Precautions/Restrictions fall  -     Row Name 03/05/22 1225          Cognition    Orientation Status (Cognition) oriented x 4  -     Row Name 03/05/22 1225          Safety Issues, Functional Mobility    Safety Issues Affecting Function (Mobility) awareness of need for assistance;insight into deficits/self-awareness  -     Impairments Affecting Function (Mobility) balance;strength;cognition;coordination;endurance/activity tolerance;postural/trunk control;shortness of breath  -           User Key  (r) = Recorded By, (t) = Taken By, (c) = Cosigned By    Initials Name Provider Type    Holland Hayes PT Physical Therapist               Mobility     Row Name 03/05/22 1226          Bed Mobility    Supine-Sit [place] (Bed Mobility) minimum assist (75% patient effort);1 person assist  -     Sit-Supine [place] (Bed Mobility) minimum assist (75% patient effort);1 person assist  -     Row Name 03/05/22 1226          Sit-Stand  Transfer    Sit-Stand [place] (Transfers) minimum assist (75% patient effort);1 person assist  -     Assistive Device (Sit-Stand Transfers) walker, front-wheeled  -     Comment, (Sit-Stand Transfer) unable to come to full standing position  -     Row Name 03/05/22 1226          Gait/Stairs (Locomotion)    [place] Level (Gait) minimum assist (75% patient effort);1 person assist  -     Assistive Device (Gait) walker, front-wheeled  -     Distance in Feet (Gait) 1 fwd/bewd step; 3 sidesteps  -     Deviations/Abnormal Patterns (Gait) festinating/shuffling;gait speed decreased;stride length decreased;weight shifting decreased  -     Bilateral Gait Deviations forward flexed posture  -           User Key  (r) = Recorded By, (t) = Taken By, (c) = Cosigned By    Initials Name Provider Type    Holland Hayes, PT Physical Therapist               Obj/Interventions     Row Name 03/05/22 1228          Range of Motion Comprehensive    General Range of Motion bilateral lower extremity ROM WFL  -     Row Name 03/05/22 1228          Strength Comprehensive (MMT)    Comment, General Manual Muscle Testing (MMT) Assessment BLE MMTs grossly 3+/5  -           User Key  (r) = Recorded By, (t) = Taken By, (c) = Cosigned By    Initials Name Provider Type    Holland Hayes, PT Physical Therapist               Goals/Plan     Row Name 03/05/22 1232          Bed Mobility Goal 1 (PT)    Activity/Assistive Device (Bed Mobility Goal 1, PT) bed mobility activities, all  -CH     [place] Level/Cues Needed (Bed Mobility Goal 1, PT) contact guard required  -     Time Frame (Bed Mobility Goal 1, PT) 1 week  -     Row Name 03/05/22 1232          Transfer Goal 1 (PT)    Activity/Assistive Device (Transfer Goal 1, PT) transfers, all  -CH     [place] Level/Cues Needed (Transfer Goal 1, PT) contact guard required  -     Time Frame (Transfer Goal 1, PT) 1 week  -     Row Name 03/05/22 1232          Gait  Training Goal 1 (PT)    Activity/Assistive Device (Gait Training Goal 1, PT) gait (walking locomotion);walker, rolling  -     [place] Level (Gait Training Goal 1, PT) minimum assist (75% or more patient effort)  -     Distance (Gait Training Goal 1, PT) 50  -CH     Time Frame (Gait Training Goal 1, PT) 1 week  -           User Key  (r) = Recorded By, (t) = Taken By, (c) = Cosigned By    Initials Name Provider Type     Holland Giron, PT Physical Therapist               Clinical Impression     Row Name 03/05/22 1228          Pain    Pretreatment Pain Rating 0/10 - no pain  -     Posttreatment Pain Rating 0/10 - no pain  -     Row Name 03/05/22 1228          Plan of Care Review    Plan of Care Reviewed With patient  -     Progress improving  -     Outcome Evaluation Patient presented to PT today with functional mobility deficits d/t weakness and fatigue. At home, she reports using a walker and cane to get around. Today, she performed functional mobility with rwx and stood 2x at EOB with min Ax1. Pt was unable to stand with fully erecto posture; daughter states this is d/t chronic low back pain. Once on feet she took 1 fwd/bwd step to be weighed on scale and then took 3 sidesteps toward HOB, all with min A and rwx. Anticipate D/C to SNF to continue progressing functional mobility for independent living.  -     Row Name 03/05/22 1228          Therapy Assessment/Plan (PT)    Patient/Family Therapy Goals Statement (PT) D/C home  -     Rehab Potential (PT) fair, will monitor progress closely  -     Criteria for Skilled Interventions Met (PT) yes;meets criteria  -     Row Name 03/05/22 1228          Positioning and Restraints    Pre-Treatment Position in bed  -     Post Treatment Position bed  -     In Bed side lying right;call light within reach;encouraged to call for assist;exit alarm on;with family/caregiver;with other staff  -           User Key  (r) = Recorded By, (t) = Taken By, (c)  = Cosigned By    Initials Name Provider Type    Holland Hayes, PT Physical Therapist               Outcome Measures     Row Name 03/05/22 1233          How much help from another person do you currently need...    Turning from your back to your side while in flat bed without using bedrails? 3  -CH     Moving from lying on back to sitting on the side of a flat bed without bedrails? 3  -CH     Moving to and from a bed to a chair (including a wheelchair)? 2  -CH     Standing up from a chair using your arms (e.g., wheelchair, bedside chair)? 3  -CH     Climbing 3-5 steps with a railing? 1  -CH     To walk in hospital room? 2  -CH     AM-PAC 6 Clicks Score (PT) 14  -CH     Row Name 03/05/22 1233          Functional Assessment    Outcome Measure Options AM-PAC 6 Clicks Basic Mobility (PT)  -           User Key  (r) = Recorded By, (t) = Taken By, (c) = Cosigned By    Initials Name Provider Type    Holland Hayes PT Physical Therapist                             Physical Therapy Education                 Title: PT OT SLP Therapies (Done)     Topic: Physical Therapy (Done)     Point: Mobility training (Done)     Learning Progress Summary           Patient Acceptance, E, VU,NR by  at 3/5/2022 1233   Family Acceptance, E, VU,NR by  at 3/5/2022 1233                   Point: Home exercise program (Done)     Learning Progress Summary           Patient Acceptance, E, VU,NR by  at 3/5/2022 1233   Family Acceptance, E, VU,NR by  at 3/5/2022 1233                   Point: Body mechanics (Done)     Learning Progress Summary           Patient Acceptance, E, VU,NR by  at 3/5/2022 1233   Family Acceptance, E, VU,NR by  at 3/5/2022 1233                   Point: Precautions (Done)     Learning Progress Summary           Patient Acceptance, E, VU,NR by  at 3/5/2022 1233   Family Acceptance, E, VU,NR by  at 3/5/2022 1233                               User Key     Initials Effective Dates Name Provider Type  Discipline     06/16/21 -  Holland Giron PT Physical Therapist PT              PT Recommendation and Plan  Planned Therapy Interventions (PT): balance training, bed mobility training, gait training, transfer training, stair training, strengthening  Plan of Care Reviewed With: patient  Progress: improving  Outcome Evaluation: Patient presented to PT today with functional mobility deficits d/t weakness and fatigue. At home, she reports using a walker and cane to get around. Today, she performed functional mobility with rwx and stood 2x at EOB with min Ax1. Pt was unable to stand with fully erecto posture; daughter states this is d/t chronic low back pain. Once on feet she took 1 fwd/bwd step to be weighed on scale and then took 3 sidesteps toward HOB, all with min A and rwx. Anticipate D/C to SNF to continue progressing functional mobility for independent living.     Time Calculation:    PT Charges     Row Name 03/05/22 1234             Time Calculation    Start Time 1012  -      Stop Time 1026  -      Time Calculation (min) 14 min  -      PT Received On 03/05/22  -      PT - Next Appointment 03/06/22  -      PT Goal Re-Cert Due Date 03/12/22  -            User Key  (r) = Recorded By, (t) = Taken By, (c) = Cosigned By    Initials Name Provider Type     Holland Giron PT Physical Therapist              Therapy Charges for Today     Code Description Service Date Service Provider Modifiers Qty    [id number]  PT EVAL LOW COMPLEXITY 2 3/5/2022 Holland Giron, PT GP 1    [id number]  PT THER PROC EA 15 MIN 3/5/2022 Holland Giron PT GP 1          PT G-Codes  Outcome Measure Options: AM-PAC 6 Clicks Basic Mobility (PT)  AM-PAC 6 Clicks Score (PT): 14  AM-PAC 6 Clicks Score (OT): 15    Holland Giron PT  3/5/2022

## 2022-03-05 NOTE — PROGRESS NOTES
LOS: 3 days   Patient Care Team:  Mariah Hickman MD as PCP - General (Family Medicine)  Beth Butcher MD as Consulting Physician (Cardiology)    Chief Complaint:     F/u chf    Interval History:     Her breathing is better, she is diuresing still.  She has no chest pain or pressure.  She had some mild nausea after she took her pills.  She has no orthopnea or PND.  She does not feel her heart racing or skipping.    Objective   Vital Signs  Temp:  [97.8 °F (36.6 °C)-98.2 °F (36.8 °C)] 97.8 °F (36.6 °C)  Heart Rate:  [58-68] 61  Resp:  [16-18] 18  BP: (145-172)/(53-66) 149/53    Intake/Output Summary (Last 24 hours) at 3/5/2022 1011  Last data filed at 3/4/2022 1034  Gross per 24 hour   Intake --   Output 1150 ml   Net -1150 ml       Comfortable NAD  Neck supple, no JVD or thyromegaly appreciated  S1/S2 RRR, no m/r/g  Lungs CTA B, normal effort  Abdomen S/NT/ND (+) BS, no HSM appreciated  Extremities warm, no clubbing, cyanosis, 1+ lower extremity edema  No visible or palpable skin lesions  A/Ox4, mood and affect appropriate    Results Review:      Results from last 7 days   Lab Units 03/05/22  0544 03/04/22  0645 03/03/22  1158   SODIUM mmol/L 136 139 137   POTASSIUM mmol/L 3.8 4.1 4.4   CHLORIDE mmol/L 92* 96* 98   CO2 mmol/L 29.0 29.0 26.0   BUN mg/dL 68* 62* 56*   CREATININE mg/dL 2.70* 2.70* 2.68*   GLUCOSE mg/dL 213* 159* 167*   CALCIUM mg/dL 9.8* 9.9* 9.4     Results from last 7 days   Lab Units 03/04/22  0645 03/02/22  1632   TROPONIN T ng/mL 0.034* 0.024     Results from last 7 days   Lab Units 03/05/22  0544 03/04/22  0645 03/03/22  1531 03/02/22  1632   WBC 10*3/mm3 7.44 6.91  --  5.74   HEMOGLOBIN g/dL 11.8* 12.0  --  12.2   HEMATOCRIT % 36.4 38.1  --  38.0   PLATELETS 10*3/mm3 59*  59* 44* 38* 39*         Results from last 7 days   Lab Units 03/04/22  0645   CHOLESTEROL mg/dL 114     Results from last 7 days   Lab Units 03/03/22  1158   MAGNESIUM mg/dL 1.9     Results from last 7 days    Lab Units 03/04/22  0645   CHOLESTEROL mg/dL 114   TRIGLYCERIDES mg/dL 148   HDL CHOL mg/dL 39*   LDL CHOL mg/dL 50       I reviewed the patient's new clinical results.  I personally viewed and interpreted the patient's EKG/Telemetry data        Medication Review:   amLODIPine, 10 mg, Oral, Daily  cyanocobalamin, 1,000 mcg, Intramuscular, Daily  famotidine, 20 mg, Oral, BID  furosemide, 60 mg, Intravenous, Q8H  gabapentin, 200 mg, Oral, Nightly  hydrALAZINE, 25 mg, Oral, Q8H  insulin lispro, 0-7 Units, Subcutaneous, TID AC  isosorbide mononitrate, 30 mg, Oral, Daily  levothyroxine, 25 mcg, Oral, Daily  magnesium sulfate, 2 g, Intravenous, Once  metoprolol tartrate, 12.5 mg, Oral, Q12H  montelukast, 10 mg, Oral, Daily  predniSONE, 30 mg, Oral, Daily With Breakfast  rosuvastatin, 5 mg, Oral, Nightly  tamsulosin, 0.4 mg, Oral, Nightly             Assessment/Plan       Thrombocytopenia (HCC)    CHF (congestive heart failure) (HCC)    Other cirrhosis of liver (HCC)    1. Acute on chronic diastolic heart failure - IV lasix. Needs better blood pressure control.   2. Hypertension - not at goal. She is on amlodipine-max. Unable to titrate beta blocker due to mild bradycardia. No ACE/ARB due to renal function.  On hydralazine.  3. Acute on chronic kidney disease - cardiorenal. Nephrology is following. Creatinine holding ~2.7 with diuretics.   4.  Pulmonary hypertension - RVSP on recent echo only 38mmHg. I would continue to treat heart failure and see how she does.  5. Aortic valve replacement - peak 34.7 mmHg, mean 16.4 mmHg, JUDD 1.6cm2, DI 0.6. appears stable.  6. Diabetes mellitus type II, blood sugars elevated, likely due to current prednisone.  7.  Thrombocytopenia, is on prednisone for this.  Certainly this will increase her blood pressure some as well.  Hematology is following, studies are pending.    Blood pressure is better with diuresing.  We will consult dietitian for sodium education with heart failure and  hypertension.  Also will get care coordinators involved for inpatient rehab placement versus home health with home PT.    Add spironolactone 12.5 mg daily watch potassium and renal function.    Beth Butcher MD  03/05/22  10:11 EST

## 2022-03-05 NOTE — PLAN OF CARE
Goal Outcome Evaluation:  Plan of Care Reviewed With: patient        Progress: improving  Outcome Evaluation: Patient presented to PT today with functional mobility deficits d/t weakness and fatigue. At home, she reports using a walker and cane to get around. Today, she performed functional mobility with rwx and stood 2x at EOB with min Ax1. Pt was unable to stand with fully erecto posture; daughter states this is d/t chronic low back pain. Once on feet she took 1 fwd/bwd step to be weighed on scale and then took 3 sidesteps toward HOB, all with min A and rwx. Anticipate D/C to SNF to continue progressing functional mobility for independent living. Pt will continue to benefit from skilled PT to address remaining functional mobility deficits and to reach functional goals.     Patient was not wearing a face mask during this therapy encounter. Therapist used appropriate personal protective equipment including mask and gloves.  Mask used was standard procedure mask. Appropriate PPE was worn during the entire therapy session. Hand hygiene was completed before and after therapy session. Patient is not in enhanced droplet precautions.

## 2022-03-05 NOTE — PROGRESS NOTES
LOS: 3 days   Patient Care Team:  Mariah Hickman MD as PCP - General (Family Medicine)  Beth Butcher MD as Consulting Physician (Cardiology)    Chief Complaint: Severe thrombocytopenia.  CHF.    Interval History:  On 3/4/2022, patient reports tolerating oral prednisone.  She slept very well last night.  Reports no bleeding.    On 3/5/2022, patient reports her sleep in the night was interrupted healthcare workers.  Otherwise has no specific complaints.  Lab study this morning showed improved platelets 59,000 with IPF 11.7%.    A comprehensive 14 point review of systems was performed and was negative except as mentioned.    Vital Signs:  Temp:  [97.8 °F (36.6 °C)-98.2 °F (36.8 °C)] 97.8 °F (36.6 °C)  Heart Rate:  [58-68] 61  Resp:  [16-18] 18  BP: (145-172)/(53-66) 149/53    Intake/Output Summary (Last 24 hours) at 3/5/2022 0907  Last data filed at 3/4/2022 1034  Gross per 24 hour   Intake --   Output 1150 ml   Net -1150 ml       Physical Exam:   General Appearance:    No acute distress, alert and oriented x4   Lungs:     Clear to auscultation bilaterally     Heart:    Regular rhythm and normal rate.  No murmurs, gallops, or       rubs.   Abdomen:     Soft, non-tender, non-distended.    Extremities:    No edema.  Mild bruises.     Results Review:   Results from last 7 days   Lab Units 03/05/22  0544 03/04/22  0645 03/03/22  1531 03/02/22  1632   WBC 10*3/mm3 7.44 6.91  --  5.74   HEMOGLOBIN g/dL 11.8* 12.0  --  12.2   HEMATOCRIT % 36.4 38.1  --  38.0   PLATELETS 10*3/mm3 59*  59* 44* 38* 39*       Lab Results   Component Value Date    NEUTROABS 5.03 03/05/2022     Results from last 7 days   Lab Units 03/05/22  0544 03/04/22  0645 03/03/22  1158 03/02/22  1632   SODIUM mmol/L 136 139 137 131*   POTASSIUM mmol/L 3.8 4.1 4.4 4.8   CHLORIDE mmol/L 92* 96* 98 94*   CO2 mmol/L 29.0 29.0 26.0 21.1*   BUN mg/dL 68* 62* 56* 63*   CREATININE mg/dL 2.70* 2.70* 2.68* 2.79*   CALCIUM mg/dL 9.8* 9.9* 9.4 9.2    BILIRUBIN mg/dL  --  0.7  --  0.3   ALK PHOS U/L  --  32*  --  31*   ALT (SGPT) U/L  --  16  --  20   AST (SGOT) U/L  --  21  --  27   GLUCOSE mg/dL 213* 159* 167* 172*     Results from last 7 days   Lab Units 03/04/22  0645 03/02/22  1632   TROPONIN T ng/mL 0.034* 0.024       Results from last 7 days   Lab Units 03/03/22  1158   MAGNESIUM mg/dL 1.9     Component      Latest Ref Rng & Units 3/3/2022 3/4/2022 3/5/2022   IPF      0.90 - 6.50 % 12.20 (H)  11.70 (H)   Platelets      140 - 450 10*3/mm3 38 (LL)  59 (L)   LDH      135 - 214 U/L 253 (H) 238 (H) 226 (H)       I reviewed the patient's new clinical results.        ASSESSMENT:   1. Congestive heart failure, managed by cardiology service.     2. 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   · Stable creatinine 2.7 on 3/5/2022.  Patient is followed by Nephrology service.     3. Severe thrombocytopenia with platelets 38,000 and IPF 12.2% on 3/3/2022. Also elevated LDH at 252. She has no evidence of hemolysis under the microscope and she is not anemic.      I found out on previous CT scan, multiple CT scans reported in 2014 and 2019 and evidence of 2013 reported that the patient has liver cirrhosis by imaging standards. Her daughter seems to not be aware of that completely. I discussed with the patient and her daughter that liver cirrhosis itself could cause thrombocytopenia despite she did not have evidence of splenomegaly based on a previous CT scan examination. Nevertheless, liver produces thrombopoietin and theoretically that would be low if having liver cirrhosis. She did have borderline low platelets in the 50,000 to 60,000 range in the past couple of years since 2018. On 02/25/2022 this patient had platelets of 46,000 from her kidney doctor’s office. 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 I discussed with the patient and her daughter recommended low-dose prednisone 30 mg  daily. I discussed with them the potential side effects that definitely would cause hyperglycemia and she is already a diabetic not that well controlled and also can cause agitation and insomnia as well as side effects to the stomach to the extreme that cause bleeding. Patient is already on Pepcid twice a day which is good to continue. We will evaluate whether the patient is responding to the oral steroid treatment. If her platelets could improve to their baseline level I would be happy and certainly we are not looking to improve platelets to normal level.      · Patient was started on prednisone 30 mg daily on 3/3/2022 with platelets 38,000 and IPF 12.2%.  · On 3/4/2022, patient reports good tolerance to prednisone.  Platelets 44,000.    · On 3/5/2022, improved platelets 59,000.  No spontaneous bleeding such as epistaxis or gum bleeding.  She does have elevated glucose 213.  Continue prednisone 30 mg daily.  If platelets maintains at similar level tomorrow, will decrease to 20 mg daily.    *  Vitamin B12 deficiency.  · Lab study on 3/4/2022 also showed vitamin B12 at 238 pg/mL.  Patient likely has functional deficiency.  We started patient on vitamin B12 at 1000 mcg intramuscular injection daily for 3 days then oral vitamin B12 at 1000 mcg daily.  Vitamin B12 is very benign and cheap therapy but potentially very useful to promote platelets production.        PLAN:    1. Continue prednisone 30 mg daily.   2. Continue vitamin B12 injection intramuscular, at 1000 mcg daily for 3 doses, 2/3 today.  It is a benign therapy, and very cheap, potentially useful to help with thrombopoiesis.   3. Continue oral vitamin B12 at 1000 mcg daily.   4. Continue Pepcid twice a day for GI prophylaxis.   5. Continue management for diabetes and CHF per primary team.   6. Pending study results for serum protein immunofixation, free light chains, quantification of immunoglobulin.   7. Monitor labs CBC, and LDH in the morning.    Discussed  with the patient and her daughter at bedside.      Spoke with nursing staff.    Tera Paredes MD PhD  03/05/22  09:07 EST

## 2022-03-06 ENCOUNTER — APPOINTMENT (OUTPATIENT)
Dept: ULTRASOUND IMAGING | Facility: HOSPITAL | Age: 87
End: 2022-03-06

## 2022-03-06 ENCOUNTER — APPOINTMENT (OUTPATIENT)
Dept: GENERAL RADIOLOGY | Facility: HOSPITAL | Age: 87
End: 2022-03-06

## 2022-03-06 LAB
ANION GAP SERPL CALCULATED.3IONS-SCNC: 17 MMOL/L (ref 5–15)
BASOPHILS # BLD AUTO: 0.02 10*3/MM3 (ref 0–0.2)
BASOPHILS NFR BLD AUTO: 0.3 % (ref 0–1.5)
BUN SERPL-MCNC: 89 MG/DL (ref 8–23)
BUN/CREAT SERPL: 30.5 (ref 7–25)
CALCIUM SPEC-SCNC: 9.7 MG/DL (ref 8.2–9.6)
CHLORIDE SERPL-SCNC: 95 MMOL/L (ref 98–107)
CO2 SERPL-SCNC: 28 MMOL/L (ref 22–29)
CREAT SERPL-MCNC: 2.92 MG/DL (ref 0.57–1)
DEPRECATED RDW RBC AUTO: 46.6 FL (ref 37–54)
EGFRCR SERPLBLD CKD-EPI 2021: 14.7 ML/MIN/1.73
EOSINOPHIL # BLD AUTO: 0.02 10*3/MM3 (ref 0–0.4)
EOSINOPHIL NFR BLD AUTO: 0.3 % (ref 0.3–6.2)
ERYTHROCYTE [DISTWIDTH] IN BLOOD BY AUTOMATED COUNT: 13.8 % (ref 12.3–15.4)
GLUCOSE BLDC GLUCOMTR-MCNC: 195 MG/DL (ref 70–130)
GLUCOSE BLDC GLUCOMTR-MCNC: 207 MG/DL (ref 70–130)
GLUCOSE BLDC GLUCOMTR-MCNC: 263 MG/DL (ref 70–130)
GLUCOSE BLDC GLUCOMTR-MCNC: 377 MG/DL (ref 70–130)
GLUCOSE SERPL-MCNC: 199 MG/DL (ref 65–99)
HCT VFR BLD AUTO: 36.7 % (ref 34–46.6)
HGB BLD-MCNC: 11.8 G/DL (ref 12–15.9)
IMM GRANULOCYTES # BLD AUTO: 0.02 10*3/MM3 (ref 0–0.05)
IMM GRANULOCYTES NFR BLD AUTO: 0.3 % (ref 0–0.5)
LDH SERPL-CCNC: 204 U/L (ref 135–214)
LYMPHOCYTES # BLD AUTO: 1.52 10*3/MM3 (ref 0.7–3.1)
LYMPHOCYTES NFR BLD AUTO: 22.5 % (ref 19.6–45.3)
MAGNESIUM SERPL-MCNC: 2.1 MG/DL (ref 1.7–2.3)
MCH RBC QN AUTO: 29.4 PG (ref 26.6–33)
MCHC RBC AUTO-ENTMCNC: 32.2 G/DL (ref 31.5–35.7)
MCV RBC AUTO: 91.5 FL (ref 79–97)
MONOCYTES # BLD AUTO: 0.7 10*3/MM3 (ref 0.1–0.9)
MONOCYTES NFR BLD AUTO: 10.4 % (ref 5–12)
NEUTROPHILS NFR BLD AUTO: 4.47 10*3/MM3 (ref 1.7–7)
NEUTROPHILS NFR BLD AUTO: 66.2 % (ref 42.7–76)
NRBC BLD AUTO-RTO: 0 /100 WBC (ref 0–0.2)
PLATELET # BLD AUTO: 90 10*3/MM3 (ref 140–450)
PMV BLD AUTO: 12.6 FL (ref 6–12)
POTASSIUM SERPL-SCNC: 3.6 MMOL/L (ref 3.5–5.2)
RBC # BLD AUTO: 4.01 10*6/MM3 (ref 3.77–5.28)
SODIUM SERPL-SCNC: 140 MMOL/L (ref 136–145)
WBC NRBC COR # BLD: 6.75 10*3/MM3 (ref 3.4–10.8)

## 2022-03-06 PROCEDURE — 63710000001 PREDNISONE PER 1 MG: Performed by: HOSPITALIST

## 2022-03-06 PROCEDURE — 71045 X-RAY EXAM CHEST 1 VIEW: CPT

## 2022-03-06 PROCEDURE — 83735 ASSAY OF MAGNESIUM: CPT | Performed by: INTERNAL MEDICINE

## 2022-03-06 PROCEDURE — 83615 LACTATE (LD) (LDH) ENZYME: CPT | Performed by: INTERNAL MEDICINE

## 2022-03-06 PROCEDURE — 85025 COMPLETE CBC W/AUTO DIFF WBC: CPT | Performed by: HOSPITALIST

## 2022-03-06 PROCEDURE — 76775 US EXAM ABDO BACK WALL LIM: CPT

## 2022-03-06 PROCEDURE — 63710000001 INSULIN LISPRO (HUMAN) PER 5 UNITS: Performed by: HOSPITALIST

## 2022-03-06 PROCEDURE — 25010000002 CYANOCOBALAMIN PER 1000 MCG: Performed by: INTERNAL MEDICINE

## 2022-03-06 PROCEDURE — 99232 SBSQ HOSP IP/OBS MODERATE 35: CPT | Performed by: INTERNAL MEDICINE

## 2022-03-06 PROCEDURE — 82962 GLUCOSE BLOOD TEST: CPT

## 2022-03-06 PROCEDURE — 80048 BASIC METABOLIC PNL TOTAL CA: CPT | Performed by: HOSPITALIST

## 2022-03-06 RX ORDER — POTASSIUM CHLORIDE 750 MG/1
40 TABLET, FILM COATED, EXTENDED RELEASE ORAL ONCE
Status: COMPLETED | OUTPATIENT
Start: 2022-03-06 | End: 2022-03-06

## 2022-03-06 RX ORDER — FUROSEMIDE 40 MG/1
40 TABLET ORAL
Status: DISCONTINUED | OUTPATIENT
Start: 2022-03-06 | End: 2022-03-08

## 2022-03-06 RX ADMIN — FAMOTIDINE 20 MG: 20 TABLET ORAL at 21:36

## 2022-03-06 RX ADMIN — INSULIN LISPRO 4 UNITS: 100 INJECTION, SOLUTION INTRAVENOUS; SUBCUTANEOUS at 18:46

## 2022-03-06 RX ADMIN — HYDRALAZINE HYDROCHLORIDE 25 MG: 25 TABLET, FILM COATED ORAL at 05:16

## 2022-03-06 RX ADMIN — MONTELUKAST SODIUM 10 MG: 10 TABLET, FILM COATED ORAL at 08:15

## 2022-03-06 RX ADMIN — HYDRALAZINE HYDROCHLORIDE 25 MG: 25 TABLET, FILM COATED ORAL at 21:36

## 2022-03-06 RX ADMIN — GABAPENTIN 200 MG: 100 CAPSULE ORAL at 21:35

## 2022-03-06 RX ADMIN — ISOSORBIDE MONONITRATE 30 MG: 30 TABLET ORAL at 08:15

## 2022-03-06 RX ADMIN — METOPROLOL TARTRATE 12.5 MG: 25 TABLET, FILM COATED ORAL at 12:24

## 2022-03-06 RX ADMIN — POTASSIUM CHLORIDE 40 MEQ: 750 TABLET, EXTENDED RELEASE ORAL at 18:46

## 2022-03-06 RX ADMIN — Medication 10 ML: at 08:16

## 2022-03-06 RX ADMIN — TAMSULOSIN HYDROCHLORIDE 0.4 MG: 0.4 CAPSULE ORAL at 21:36

## 2022-03-06 RX ADMIN — AMLODIPINE BESYLATE 10 MG: 10 TABLET ORAL at 08:16

## 2022-03-06 RX ADMIN — INSULIN LISPRO 5 UNITS: 100 INJECTION, SOLUTION INTRAVENOUS; SUBCUTANEOUS at 18:47

## 2022-03-06 RX ADMIN — HYDRALAZINE HYDROCHLORIDE 25 MG: 25 TABLET, FILM COATED ORAL at 14:58

## 2022-03-06 RX ADMIN — LEVOTHYROXINE SODIUM 25 MCG: 0.03 TABLET ORAL at 08:15

## 2022-03-06 RX ADMIN — LORAZEPAM 0.5 MG: 0.5 TABLET ORAL at 21:38

## 2022-03-06 RX ADMIN — INSULIN LISPRO 5 UNITS: 100 INJECTION, SOLUTION INTRAVENOUS; SUBCUTANEOUS at 08:15

## 2022-03-06 RX ADMIN — INSULIN GLARGINE-YFGN 15 UNITS: 100 INJECTION, SOLUTION SUBCUTANEOUS at 21:35

## 2022-03-06 RX ADMIN — ROSUVASTATIN CALCIUM 5 MG: 20 TABLET, FILM COATED ORAL at 21:36

## 2022-03-06 RX ADMIN — FUROSEMIDE 80 MG: 80 TABLET ORAL at 08:15

## 2022-03-06 RX ADMIN — ACETAMINOPHEN 1000 MG: 500 TABLET, FILM COATED ORAL at 21:38

## 2022-03-06 RX ADMIN — PREDNISONE 20 MG: 20 TABLET ORAL at 08:16

## 2022-03-06 RX ADMIN — SPIRONOLACTONE 12.5 MG: 25 TABLET ORAL at 08:15

## 2022-03-06 RX ADMIN — FUROSEMIDE 40 MG: 40 TABLET ORAL at 18:46

## 2022-03-06 RX ADMIN — METOPROLOL TARTRATE 12.5 MG: 25 TABLET, FILM COATED ORAL at 21:36

## 2022-03-06 RX ADMIN — INSULIN LISPRO 5 UNITS: 100 INJECTION, SOLUTION INTRAVENOUS; SUBCUTANEOUS at 12:24

## 2022-03-06 RX ADMIN — FAMOTIDINE 20 MG: 20 TABLET ORAL at 08:15

## 2022-03-06 RX ADMIN — CYANOCOBALAMIN 1000 MCG: 1000 INJECTION, SOLUTION INTRAMUSCULAR at 08:16

## 2022-03-06 RX ADMIN — INSULIN LISPRO 2 UNITS: 100 INJECTION, SOLUTION INTRAVENOUS; SUBCUTANEOUS at 12:23

## 2022-03-06 RX ADMIN — INSULIN LISPRO 3 UNITS: 100 INJECTION, SOLUTION INTRAVENOUS; SUBCUTANEOUS at 08:14

## 2022-03-06 NOTE — PROGRESS NOTES
Merged with [facility] INPATIENT PROGRESS NOTE         [address]    3/6/2022      PATIENT IDENTIFICATION:  Name: Helena Carmen ADMIT: 3/2/2022   : 1931  PCP: Mariah Hickman MD    MRN: [id number] LOS: 4 days   AGE/SEX: 89 y.o. female  ROOM: [facility]                     LOS 4    Reason for visit: Pulmonary hypertension and CHF      SUBJECTIVE:      No new issues overnight.  On 2 L nasal cannula oxygen.    Objective   OBJECTIVE:    Vital Sign Min/Max for last 24 hours  Temp  Min: 97.5 °F (36.4 °C)  Max: 98.5 °F (36.9 °C)   BP  Min: 118/76  Max: 138/53   Pulse  Min: 54  Max: 67   Resp  Min: 18  Max: 18   SpO2  Min: 95 %  Max: 98 %   No data recorded   Weight  Min: 69.5 kg (153 lb 3.2 oz)  Max: 73.1 kg (161 lb 3.2 oz)                         Body mass index is 33.15 kg/m².    Intake/Output Summary (Last 24 hours) at 3/6/2022 0840  Last data filed at 3/6/2022 0127  Gross per 24 hour   Intake --   Output 2800 ml   Net -2800 ml         Exam:  GEN:  No distress, appears stated age  EYES:   PERRL, anicteric sclerae  ENT:    External ears/nose normal, OP clear  NECK:  No adenopathy, midline trachea  LUNGS: Normal chest on inspection, palpation and mild rales at bases on auscultation  CV:  Normal S1S2, without murmur  ABD:  Nontender, nondistended, no hepatosplenomegaly, +BS  EXT:  + edema.  No cyanosis or clubbing.  No mottling and normal cap refill.    Assessment     Scheduled meds:  amLODIPine, 10 mg, Oral, Daily  famotidine, 20 mg, Oral, BID  furosemide, 80 mg, Oral, BID  gabapentin, 200 mg, Oral, Nightly  hydrALAZINE, 25 mg, Oral, Q8H  insulin glargine, 10 Units, Subcutaneous, Nightly  insulin lispro, 0-7 Units, Subcutaneous, TID AC  insulin lispro, 5 Units, Subcutaneous, TID With Meals  isosorbide mononitrate, 30 mg, Oral, Daily  levothyroxine, 25 mcg, Oral, Daily  metoprolol tartrate, 12.5 mg, Oral, Q12H  montelukast, 10 mg, Oral, Daily  predniSONE, 20 mg, Oral, Daily With Breakfast  rosuvastatin, 5  mg, Oral, Nightly  spironolactone, 12.5 mg, Oral, Daily  tamsulosin, 0.4 mg, Oral, Nightly      IV meds:                         Data Review:  Results from last 7 days   Lab Units 03/06/22  0654 03/05/22  0544 03/04/22  0645 03/03/22  1158 03/02/22  1632   SODIUM mmol/L 140 136 139 137 131*   POTASSIUM mmol/L 3.6 3.8 4.1 4.4 4.8   CHLORIDE mmol/L 95* 92* 96* 98 94*   CO2 mmol/L 28.0 29.0 29.0 26.0 21.1*   BUN mg/dL 89* 68* 62* 56* 63*   CREATININE mg/dL 2.92* 2.70* 2.70* 2.68* 2.79*   GLUCOSE mg/dL 199* 213* 159* 167* 172*   CALCIUM mg/dL 9.7* 9.8* 9.9* 9.4 9.2         Estimated Creatinine Clearance: 10.9 mL/min (A) (by C-G formula based on SCr of 2.92 mg/dL (H)).  Results from last 7 days   Lab Units 03/06/22  0654 03/05/22  0544 03/04/22  0645 03/03/22  1531 03/02/22  1632   WBC 10*3/mm3 6.75 7.44 6.91  --  5.74   HEMOGLOBIN g/dL 11.8* 11.8* 12.0  --  12.2   PLATELETS 10*3/mm3 90* 59*  59* 44* 38* 39*         Results from last 7 days   Lab Units 03/04/22  0645 03/02/22  1632   ALT (SGPT) U/L 16 20   AST (SGOT) U/L 21 27                 Hemoglobin A1C   Date/Time Value Ref Range Status   03/04/2022 0645 6.70 (H) 4.80 - 5.60 % Final     Glucose   Date/Time Value Ref Range Status   03/06/2022 0616 207 (H) 70 - 130 mg/dL Final     Comment:     Meter: [id number] : [id number] Conrado JOHNSON   03/05/2022 2057 351 (H) 70 - 130 mg/dL Final     Comment:     Meter: [id number] : [id number] Conrado JONHSON   03/05/2022 1956 365 (H) 70 - 130 mg/dL Final     Comment:     Meter: [id number] : [id number] Conrado Yuri    03/05/2022 1552 316 (H) 70 - 130 mg/dL Final     Comment:     Meter: [id number] : [id number] Pabon Swapna    03/05/2022 1103 181 (H) 70 - 130 mg/dL Final     Comment:     Meter: [id number] : [id number] Pabon Swapna NA   03/05/2022 0614 215 (H) 70 - 130 mg/dL Final     Comment:     Meter: [id number] : [id number] Conrado Yuri    03/04/2022 2040 319 (H) 70 - 130 mg/dL Final      Comment:     Meter: [id number] : [id number] Conrado JOHNSON     Chest x-ray 3/2 reviewed shows small right pleural effusion and basilar atelectasis        Microbiology reviewed          Active Hospital Problems    Diagnosis  POA   • Other cirrhosis of liver (HCC) [K74.69]  Unknown   • CHF (congestive heart failure) (HCC) [I50.9]  Yes   • Thrombocytopenia (HCC) [D69.6]  Yes      Resolved Hospital Problems   No resolved problems to display.         ASSESSMENT:    Pulmonary hypertension: WHO group II  Acute on chronic diastolic CHF  Aortic valve replacement  Type 2 diabetes  Hypertension        PLAN:    Patient's pulmonary hypertension is secondary to left-sided disease and post capillary pulmonary hypertension.  No indication for right heart catheterization in this patient.  No indication for pulmonary arterial hypertensive medications.  Mainstay treatment as blood pressure control and diuretics for CHF.  Wean oxygen as able.  Repeat x-ray for follow-up post diuresis.      Juancarlos Siddiqui MD  Pulmonary and Critical Care Medicine  [facility], [facility]  3/6/2022    08:40 EST

## 2022-03-06 NOTE — PROGRESS NOTES
LOS: 4 days   Patient Care Team:  Mariah Hickman MD as PCP - General (Family Medicine)  Beth Butcher MD as Consulting Physician (Cardiology)    Chief Complaint:     F/u chf    Interval History:     She denies chest pain or difficulty breathing.  She does not feel her heart racing or skipping.  She has nausea after taking her medications but no vomiting or diarrhea.    Objective   Vital Signs  Temp:  [97.5 °F (36.4 °C)-98.5 °F (36.9 °C)] 97.6 °F (36.4 °C)  Heart Rate:  [54-67] 58  Resp:  [18] 18  BP: (118-138)/(53-76) 136/56    Intake/Output Summary (Last 24 hours) at 3/6/2022 0825  Last data filed at 3/6/2022 0127  Gross per 24 hour   Intake --   Output 2800 ml   Net -2800 ml       Comfortable NAD  Neck supple, no JVD or thyromegaly appreciated  S1/S2 RRR, no m/r/g  Lungs CTA B, normal effort  Abdomen S/NT/ND (+) BS, no HSM appreciated  Extremities warm, no clubbing, cyanosis, or edema  No visible or palpable skin lesions  A/Ox4, mood and affect appropriate    Results Review:      Results from last 7 days   Lab Units 03/05/22 0544 03/04/22  0645 03/03/22  1158   SODIUM mmol/L 136 139 137   POTASSIUM mmol/L 3.8 4.1 4.4   CHLORIDE mmol/L 92* 96* 98   CO2 mmol/L 29.0 29.0 26.0   BUN mg/dL 68* 62* 56*   CREATININE mg/dL 2.70* 2.70* 2.68*   GLUCOSE mg/dL 213* 159* 167*   CALCIUM mg/dL 9.8* 9.9* 9.4     Results from last 7 days   Lab Units 03/04/22  0645 03/02/22  1632   TROPONIN T ng/mL 0.034* 0.024     Results from last 7 days   Lab Units 03/05/22  0544 03/04/22  0645 03/03/22  1531 03/02/22  1632   WBC 10*3/mm3 7.44 6.91  --  5.74   HEMOGLOBIN g/dL 11.8* 12.0  --  12.2   HEMATOCRIT % 36.4 38.1  --  38.0   PLATELETS 10*3/mm3 59*  59* 44* 38* 39*         Results from last 7 days   Lab Units 03/04/22  0645   CHOLESTEROL mg/dL 114     Results from last 7 days   Lab Units 03/03/22  1158   MAGNESIUM mg/dL 1.9     Results from last 7 days   Lab Units 03/04/22  0645   CHOLESTEROL mg/dL 114   TRIGLYCERIDES  mg/dL 148   HDL CHOL mg/dL 39*   LDL CHOL mg/dL 50       I reviewed the patient's new clinical results.  I personally viewed and interpreted the patient's EKG/Telemetry data        Medication Review:   amLODIPine, 10 mg, Oral, Daily  famotidine, 20 mg, Oral, BID  furosemide, 80 mg, Oral, BID  gabapentin, 200 mg, Oral, Nightly  hydrALAZINE, 25 mg, Oral, Q8H  insulin glargine, 10 Units, Subcutaneous, Nightly  insulin lispro, 0-7 Units, Subcutaneous, TID AC  insulin lispro, 5 Units, Subcutaneous, TID With Meals  isosorbide mononitrate, 30 mg, Oral, Daily  levothyroxine, 25 mcg, Oral, Daily  metoprolol tartrate, 12.5 mg, Oral, Q12H  montelukast, 10 mg, Oral, Daily  predniSONE, 20 mg, Oral, Daily With Breakfast  rosuvastatin, 5 mg, Oral, Nightly  spironolactone, 12.5 mg, Oral, Daily  tamsulosin, 0.4 mg, Oral, Nightly             Assessment/Plan       Thrombocytopenia (HCC)    CHF (congestive heart failure) (HCC)    Other cirrhosis of liver (HCC)       1. Acute on chronic diastolic heart failure - IV lasix. Needs better blood pressure control.   2. Hypertension - not at goal. She is on amlodipine-max. Unable to titrate beta blocker due to mild bradycardia. No ACE/ARB due to renal function.  On hydralazine and spironolactone and tolerating.  3. Acute on chronic kidney disease - cardiorenal. Nephrology is following. Creatinine holding ~2.7 with diuretics.   4.  Pulmonary hypertension - RVSP on recent echo only 38mmHg. I would continue to treat heart failure and see how she does.  5. Aortic valve replacement - peak 34.7 mmHg, mean 16.4 mmHg, JUDD 1.6cm2, DI 0.6. appears stable.  6. Diabetes mellitus type II, blood sugars elevated, likely due to current prednisone.  7.  Thrombocytopenia, is on prednisone for this.  Certainly this will increase her blood pressure some as well.  Hematology is following, studies are pending.    She appears euvolemic.  Chest x-ray today does not show any pulmonary edema, her creatinine is rising,  decrease furosemide to 40 mg twice daily and maintain her other current medications.  Watch volume status.    Yesterday consulted care coordinators for rehab placement versus home PT and home health.  Also consulted dietitian for dietary education on salt.      Beth Butcher MD  03/06/22  08:25 EST

## 2022-03-06 NOTE — PROGRESS NOTES
"Daily progress note    Chief complaint  Doing better  No new complaints  Denies any chest pain shortness of breath palpitation  Family at bedside    History of present illness  89-year-old white female with history of diastolic CHF hypertension aortic stenosis s/p AVR pulmonary hypertension atrial fibrillation and chronic kidney disease stage IV presented to [facility] emergency room with increased shortness of breath generalized weakness leg swelling and weight gain.  Patient also have abdominal pain but no nausea vomiting diarrhea.  Patient denies any chest pain fever chills cough.  Patient work-up in ER revealed acute kidney injury and decompression CHF admit for management.  Patient is full code.    REVIEW OF SYSTEMS  All systems reviewed and negative except for those discussed in HPI.      PHYSICAL EXAM   Blood pressure 136/56, pulse 58, temperature 97.6 °F (36.4 °C), temperature source Oral, resp. rate 18, height 144.8 cm (57\"), weight 69.5 kg (153 lb 3.2 oz), SpO2 95 %.    GENERAL: Awake and alert, nontoxic-appearing  HENT: nares patent  EYES: no scleral icterus  CV: regular rhythm, regular rate  RESPIRATORY: Mild increased work of breathing with crackles in the bases  ABDOMEN: soft, nontender mildly distended bowel sounds positive  MUSCULOSKELETAL: no deformity, 1+ pedal edema in both legs into the thigh  NEURO: alert, moves all extremities, follows commands  SKIN: warm, dry     LAB RESULTS  Lab Results (last 24 hours)     Procedure Component Value Units Date/Time    POC Glucose Once [[id number]]  (Abnormal) Collected: 03/06/22 1104    Specimen: Blood Updated: 03/06/22 1106     Glucose 195 mg/dL      Comment: Meter: [id number] : [id number] JOHNSON       Basic Metabolic Panel [[id number]]  (Abnormal) Collected: 03/06/22 0654    Specimen: Blood Updated: 03/06/22 0835     Glucose 199 mg/dL      BUN 89 mg/dL      Creatinine 2.92 mg/dL      Sodium 140 mmol/L      Potassium 3.6 mmol/L      " Chloride 95 mmol/L      CO2 28.0 mmol/L      Calcium 9.7 mg/dL      BUN/Creatinine Ratio 30.5     Anion Gap 17.0 mmol/L      eGFR 14.7 mL/min/1.73      Comment: <15 Indicative of kidney failure       Narrative:      GFR Normal >60  Chronic Kidney Disease <60  Kidney Failure <15      Lactate Dehydrogenase [[id number]]  (Normal) Collected: 03/06/22 0654    Specimen: Blood Updated: 03/06/22 0835      U/L     Magnesium [[id number]]  (Normal) Collected: 03/06/22 0654    Specimen: Blood Updated: 03/06/22 0835     Magnesium 2.1 mg/dL     CBC & Differential [[id number]]  (Abnormal) Collected: 03/06/22 0654    Specimen: Blood Updated: 03/06/22 0832    Narrative:      The following orders were created for panel order CBC & Differential.  Procedure                               Abnormality         Status                     ---------                               -----------         ------                     CBC Auto Differential[[id number]]        Abnormal            Final result                 Please view results for these tests on the individual orders.    CBC Auto Differential [[id number]]  (Abnormal) Collected: 03/06/22 0654    Specimen: Blood Updated: 03/06/22 0832     WBC 6.75 10*3/mm3      RBC 4.01 10*6/mm3      Hemoglobin 11.8 g/dL      Hematocrit 36.7 %      MCV 91.5 fL      MCH 29.4 pg      MCHC 32.2 g/dL      RDW 13.8 %      RDW-SD 46.6 fl      MPV 12.6 fL      Platelets 90 10*3/mm3      Neutrophil % 66.2 %      Lymphocyte % 22.5 %      Monocyte % 10.4 %      Eosinophil % 0.3 %      Basophil % 0.3 %      Immature Grans % 0.3 %      Neutrophils, Absolute 4.47 10*3/mm3      Lymphocytes, Absolute 1.52 10*3/mm3      Monocytes, Absolute 0.70 10*3/mm3      Eosinophils, Absolute 0.02 10*3/mm3      Basophils, Absolute 0.02 10*3/mm3      Immature Grans, Absolute 0.02 10*3/mm3      nRBC 0.0 /100 WBC     POC Glucose Once [[id number]]  (Abnormal) Collected: 03/06/22 0616    Specimen: Blood Updated: 03/06/22 0617     Glucose  207 mg/dL      Comment: Meter: [id number] : [id number] Conrado Welch NA       POC Glucose Once [[id number]]  (Abnormal) Collected: 03/05/22 2057    Specimen: Blood Updated: 03/05/22 2100     Glucose 351 mg/dL      Comment: Meter: [id number] : [id number] Conrado Marta NA       POC Glucose Once [[id number]]  (Abnormal) Collected: 03/05/22 1956    Specimen: Blood Updated: 03/05/22 1958     Glucose 365 mg/dL      Comment: Meter: [id number] : [id number] Conrado Marta NA       POC Glucose Once [[id number]]  (Abnormal) Collected: 03/05/22 1552    Specimen: Blood Updated: 03/05/22 1554     Glucose 316 mg/dL      Comment: Meter: [id number] : [id number] Cm JOHNSON       Nuclear Antigen Antibody, IFA [[id number]]  (Abnormal) Collected: 03/03/22 1531    Specimen: Blood Updated: 03/05/22 1510     BECCA Positive     Comment:                                      Negative   <1:80                                       Borderline  1:80                                       Positive   >1:80  Speckled cytoplasmic fluorescence is present. The antibodies noted in  this pattern may be associated with, but not restricted to, primary  biliary cirrhosis (PBC), polymyositis and dermatomyositis (PM/DM),  and/or systemic lupus erythematosus (SLE).       Narrative:      Performed at:  [address] [place] [address]  [id number]  : Toño Flores PhD, Phone:  [phone]    HERMELINDA Staining Patterns [[id number]]  (Abnormal) Collected: 03/03/22 1531    Specimen: Blood Updated: 03/05/22 1510     Homogeneous Pattern 1:320     Comment: ICAP nomenclature: AC-1        Note: (Reference) Comment     Comment: For more information about Hep-2 cell patterns use  ANApatterns.org, the official website for the International  Consensus on Antinuclear Antibody (BECCA) Patterns (ICAP).  ------------------------------------------------------------  A positive BECCA result may occur in healthy individuals (low  titer) or  be associated with a variety of diseases.  See  interpretation chart which is not all inclusive:  Pattern      Antigen Detected  Suggested Disease Association  -----------  ----------------  -----------------------------  Homogeneous  DNA(ds,ss),       SLE - High titers               Nucleosomes,               Histones          Drug-induced SLE  -----------  ----------------  -----------------------------  Speckled     Sm, RNP, SCL-70,  SLE,MCTD,PSS (diffuse form),               SS-A/SS-B         Sjogrens  -----------  ----------------  -----------------------------  Nucleolar    SCL-70, PM-1/SCL  High titers Scleroderma,                                 PM/DM  -----------  ----------------  -----------------------------  Centromere   Centromere        PSS (limited form) w/Crest                                 syndrome variable  -----------  ----------------  -----------------------------  Nuclear Dot  [id number]  Primary Biliary Cirrhosis  -----------  ----------------  -----------------------------  Nuclear      ,            Primary Biliary Cirrhosis  Membrane     tracie A,B,C  -----------  ----------------  -----------------------------       Narrative:      Performed at:  [address]  [id number]  : Toño Flores PhD, Phone:  [phone]        Imaging Results (Last 24 Hours)     Procedure Component Value Units Date/Time    XR Chest 1 View [[id number]] Collected: 03/06/22 1014     Updated: 03/06/22 1019    Narrative:      ONE VIEW PORTABLE CHEST AT 9:29 AM     HISTORY: Shortness of breath.     FINDINGS: There is cardiomegaly with sternal wires from previous cardiac  surgery and this is unchanged from 4 days ago. There is now improved  lung expansion with resolution of previous changes of vascular  congestion. A previous small right pleural effusion has resolved.     This report was finalized on 3/6/2022 10:15 AM by Dr. Miller Mayorga M.D.        Renal  Bilateral [[id number]] Resulted: 03/06/22 0925     Updated: 03/06/22 0926             ECG 12 Lead  Component   Ref Range & Units 3/2/22 1559   QT Interval   ms 449              HEART RATE= 50  bpm  RR Interval= 1196  ms  NV Interval= 170  ms  P Horizontal Axis= 36  deg  P Front Axis= 60  deg  QRSD Interval= 109  ms  QT Interval= 449  ms  QRS Axis= 64  deg  T Wave Axis= 58  deg  - OTHERWISE NORMAL ECG -  Sinus rhythm  Low voltage, precordial leads  Rate slower             Current Facility-Administered Medications:   •  acetaminophen (TYLENOL) tablet 1,000 mg, 1,000 mg, Oral, Q6H PRN, Mango Arnold MD, 1,000 mg at 03/03/22 2101  •  amLODIPine (NORVASC) tablet 10 mg, 10 mg, Oral, Daily, Mango Arnold MD, 10 mg at 03/06/22 0816  •  famotidine (PEPCID) tablet 20 mg, 20 mg, Oral, BID, Mango Arnold MD, 20 mg at 03/06/22 0815  •  furosemide (LASIX) tablet 40 mg, 40 mg, Oral, BID, Beth Butcher MD  •  gabapentin (NEURONTIN) capsule 200 mg, 200 mg, Oral, Nightly, Jordan Vázquez MD, 200 mg at 03/05/22 2203  •  hydrALAZINE (APRESOLINE) tablet 25 mg, 25 mg, Oral, Q8H, Jordan Vázquez MD, 25 mg at 03/06/22 0516  •  insulin glargine (LANTUS, SEMGLEE) injection 10 Units, 10 Units, Subcutaneous, Nightly, Mango Arnold MD, 10 Units at 03/05/22 2217  •  insulin lispro (ADMELOG) injection 0-7 Units, 0-7 Units, Subcutaneous, TID AC, Mango Arnold MD, 2 Units at 03/06/22 1223  •  insulin lispro (ADMELOG) injection 5 Units, 5 Units, Subcutaneous, TID With Meals, Mango Arnold MD, 5 Units at 03/06/22 1224  •  isosorbide mononitrate (IMDUR) 24 hr tablet 30 mg, 30 mg, Oral, Daily, Mango Arnold MD, 30 mg at 03/06/22 0815  •  levothyroxine (SYNTHROID, LEVOTHROID) tablet 25 mcg, 25 mcg, Oral, Daily, Mango Arnold MD, 25 mcg at 03/06/22 0815  •  LORazepam (ATIVAN) tablet 0.5 mg, 0.5 mg, Oral, Q6H PRN, Mango Arnold MD, 0.5 mg at 03/03/22 2058  •  metoprolol tartrate (LOPRESSOR) tablet 12.5 mg, 12.5 mg, Oral, Q12H,  Kadeem Rivas MD, 12.5 mg at 03/06/22 1224  •  montelukast (SINGULAIR) tablet 10 mg, 10 mg, Oral, Daily, Kadeem Rivas MD, 10 mg at 03/06/22 0815  •  predniSONE (DELTASONE) tablet 20 mg, 20 mg, Oral, Daily With Breakfast, Kadeem Rivas MD, 20 mg at 03/06/22 0816  •  rosuvastatin (CRESTOR) tablet 5 mg, 5 mg, Oral, Nightly, Kadeem Rivas MD, 5 mg at 03/05/22 2203  •  [COMPLETED] Insert peripheral IV, , , Once **AND** sodium chloride 0.9 % flush 10 mL, 10 mL, Intravenous, PRN, Tomás Garnett MD, 10 mL at 03/06/22 0816  •  spironolactone (ALDACTONE) tablet 12.5 mg, 12.5 mg, Oral, Daily, Beth Butcher MD, 12.5 mg at 03/06/22 0815  •  tamsulosin (FLOMAX) 24 hr capsule 0.4 mg, 0.4 mg, Oral, Nightly, Kadeem Rivas MD, 0.4 mg at 03/05/22 2203     ASSESSMENT  Acute on chronic diastolic CHF  Acute kidney injury  Chronic kidney disease stage IV  Paroxysmal atrial fibrillation  Aortic stenosis s/p AVR  Diabetes mellitus  Hypertension  Hyperlipidemia  Thrombocytopenia  Pulmonary hypertension  Gastroesophageal reflux disease    PLAN  CPM  IV diuresis  Strict I's and O's and daily weight  Nephrology and pulmonary consult appreciated  Cardiology to follow patient  Adjust home medications  Stress ulcer DVT prophylaxis  Supportive care  PT/OT  DNR  Discussed with nursing staff  Follow closely and further recommendation according to hospital course    KADEEM RIVAS MD

## 2022-03-06 NOTE — PROGRESS NOTES
[facility] Progress Note      Patient Name: Helena Carmen  : 1931  MRN: [id number]  Primary Care Physician:  Mariah Hickman MD  Date of admission: 3/2/2022    Subjective     Interval History:   Breathing is more comfortable; still on 2 L/min  She feels less swollen  2.8 L UOP yesterday        Review of Systems:   As noted above    Objective     Vitals:   Temp:  [97.5 °F (36.4 °C)-98.3 °F (36.8 °C)] 97.7 °F (36.5 °C)  Heart Rate:  [54-67] 57  Resp:  [18] 18  BP: (118-138)/(53-76) 137/66  Flow (L/min):  [2] 2    Intake/Output Summary (Last 24 hours) at 3/6/2022 1726  Last data filed at 3/6/2022 0127  Gross per 24 hour   Intake --   Output 1000 ml   Net -1000 ml       Physical Exam:    Constitutional: Awake, NAD, oriented fully; looks younger than stated age  HEENT: Sclera anicteric, no conjunctival injection  Neck: Supple, trachea at midline, bilateral carotid bruits  Respiratory:  A few crackles in bases, nonlabored respiration on 2 L/min  Cardiovascular: RR, valentín, 2/6M, no rub  Gastrointestinal: BS +, soft, nontender, +distended  : No palpable bladder; external urinary catheter  Musculoskeletal:  Trace edema; no clubbing or cyanosis  Psychiatric: Flat affect; cooperative; fully oriented  Neurologic: moving all extremities, normal speech and mental status  Skin: Warm and dry     Scheduled Meds:     amLODIPine, 10 mg, Oral, Daily  famotidine, 20 mg, Oral, BID  furosemide, 40 mg, Oral, BID  gabapentin, 200 mg, Oral, Nightly  hydrALAZINE, 25 mg, Oral, Q8H  insulin glargine, 15 Units, Subcutaneous, Nightly  insulin lispro, 0-7 Units, Subcutaneous, TID AC  insulin lispro, 5 Units, Subcutaneous, TID With Meals  isosorbide mononitrate, 30 mg, Oral, Daily  levothyroxine, 25 mcg, Oral, Daily  metoprolol tartrate, 12.5 mg, Oral, Q12H  montelukast, 10 mg, Oral, Daily  predniSONE, 20 mg, Oral, Daily With Breakfast  rosuvastatin, 5 mg, Oral, Nightly  spironolactone, 12.5 mg, Oral,  Daily  tamsulosin, 0.4 mg, Oral, Nightly      IV Meds:        Results Reviewed:   I have personally reviewed the results from the time of this admission to 3/6/2022 17:26 EST     Results from last 7 days   Lab Units 03/06/22  0654 03/05/22  0544 03/04/22  0645 03/03/22  1158 03/02/22  1632   SODIUM mmol/L 140 136 139   < > 131*   POTASSIUM mmol/L 3.6 3.8 4.1   < > 4.8   CHLORIDE mmol/L 95* 92* 96*   < > 94*   CO2 mmol/L 28.0 29.0 29.0   < > 21.1*   BUN mg/dL 89* 68* 62*   < > 63*   CREATININE mg/dL 2.92* 2.70* 2.70*   < > 2.79*   CALCIUM mg/dL 9.7* 9.8* 9.9*   < > 9.2   BILIRUBIN mg/dL  --   --  0.7  --  0.3   ALK PHOS U/L  --   --  32*  --  31*   ALT (SGPT) U/L  --   --  16  --  20   AST (SGOT) U/L  --   --  21  --  27   GLUCOSE mg/dL 199* 213* 159*   < > 172*    < > = values in this interval not displayed.       Estimated Creatinine Clearance: 10.9 mL/min (A) (by C-G formula based on SCr of 2.92 mg/dL (H)).    Results from last 7 days   Lab Units 03/06/22  0654 03/03/22  1158 03/02/22  1632   MAGNESIUM mg/dL 2.1 1.9 1.5*   PHOSPHORUS mg/dL  --  4.0  --        Results from last 7 days   Lab Units 03/04/22  0645   URIC ACID mg/dL 8.9*       Results from last 7 days   Lab Units 03/06/22  0654 03/05/22  0544 03/04/22  0645 03/03/22  1531 03/02/22  1632   WBC 10*3/mm3 6.75 7.44 6.91  --  5.74   HEMOGLOBIN g/dL 11.8* 11.8* 12.0  --  12.2   PLATELETS 10*3/mm3 90* 59*  59* 44* 38* 39*             Assessment / Plan     ASSESSMENT:  1.  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  Volume excess, improving;  low potassium and resolved hypervolemic hyponatremia with diuresis.   Renal ultrasound essentially negative  2.  CHF, compensated now  3.  Hypertension, exacerbated by hypervolemia, better with diuresis. Spironolactone added 3/5/22.  4.  Multi-valvular heart disease with prior AVR  5.  DM2    PLAN:  1.  Oral furosemide, with noted decrease to 40 mg twice daily  2.   Discussed with patient and daughter need to tolerate prerenal azotemia to preclude CHF.  3.  Replace potassium.  If low again tomorrow, will add scheduled potassium    Thank you for involving us in the care of Helena Carmen.  Please feel free to call with any questions.    Jordan Vázquez MD  03/06/22  17:26 [facility]          [facility]  [phone]      Much of this encounter note is an electronic transcription/translation of spoken language to printed text. The electronic translation of spoken language may permit erroneous, or at times, nonsensical words or phrases to be inadvertently transcribed; Although I have reviewed the note for such errors, some may still exist.

## 2022-03-06 NOTE — CONSULTS
Nutrition Services    Patient Name:  Helena Carmen  YOB: 1931  MRN: [id number]  Admit Date:  3/2/2022    Consult for diet education related to CHF - heart healthy (low fat, low sodium) diet.  Spoke with RN, no plans for d/c today.  RD to follow up Monday to provide education.    Electronically signed by:  Cassie Velarde RD  03/06/22 11:21 EST

## 2022-03-06 NOTE — PLAN OF CARE
Goal Outcome Evaluation:            Pt. Alert, oriented x3, no voiced c/o pain, 02 sats kept over 90% with o2 inhal. @2l/m per n/c, with F/C patent, running to BSD, no s/s acute distress noted

## 2022-03-06 NOTE — PROGRESS NOTES
LOS: 4 days   Patient Care Team:  Mariah Hickman MD as PCP - General (Family Medicine)  Beth Butcher MD as Consulting Physician (Cardiology)    Chief Complaint: Severe thrombocytopenia.  CHF.    Interval History:  On 3/4/2022, patient reports tolerating oral prednisone.  She slept very well last night.  Reports no bleeding.    On 3/5/2022, patient reports her sleep in the night was interrupted healthcare workers.  Otherwise has no specific complaints.  Lab study this morning showed improved platelets 59,000 with IPF 11.7%.    On 3/6/2022, patient reports that her baseline condition.  Afebrile.  Liver study reported much improved platelets 90,000.      A comprehensive 14 point review of systems was performed and was negative except as mentioned.    Vital Signs:  Temp:  [97.5 °F (36.4 °C)-98.5 °F (36.9 °C)] 97.6 °F (36.4 °C)  Heart Rate:  [54-67] 58  Resp:  [18] 18  BP: (118-138)/(53-76) 136/56    Intake/Output Summary (Last 24 hours) at 3/6/2022 0849  Last data filed at 3/6/2022 0127  Gross per 24 hour   Intake --   Output 2800 ml   Net -2800 ml       Physical Exam:   GENERAL:  Well-developed, well-nourished elderly  female lying in bed, in no acute distress.    SKIN:  Warm, dry.  Mild bruises on forearms.   CHEST: Normal respiratory effort.  Lungs clear to auscultation. Good airflow.  CARDIAC:  Regular rate and rhythm. Normal S1,S2.  ABDOMEN:  Soft, no tender.  Bowel sounds normal.  EXTREMITIES:  No clubbing, cyanosis or edema.  NEUROLOGICAL:  Grossly intact.    PSYCHIATRIC:  Normal affect and mood.        Results Review:   Results from last 7 days   Lab Units 03/06/22  0654 03/05/22  0544 03/04/22  0645   WBC 10*3/mm3 6.75 7.44 6.91   HEMOGLOBIN g/dL 11.8* 11.8* 12.0   HEMATOCRIT % 36.7 36.4 38.1   PLATELETS 10*3/mm3 90* 59*  59* 44*       Lab Results   Component Value Date    NEUTROABS 4.47 03/06/2022     Results from last 7 days   Lab Units 03/06/22  0654 03/05/22  0544 03/04/22  0645  03/03/22  1158 03/02/22  1632   SODIUM mmol/L 140 136 139   < > 131*   POTASSIUM mmol/L 3.6 3.8 4.1   < > 4.8   CHLORIDE mmol/L 95* 92* 96*   < > 94*   CO2 mmol/L 28.0 29.0 29.0   < > 21.1*   BUN mg/dL 89* 68* 62*   < > 63*   CREATININE mg/dL 2.92* 2.70* 2.70*   < > 2.79*   CALCIUM mg/dL 9.7* 9.8* 9.9*   < > 9.2   BILIRUBIN mg/dL  --   --  0.7  --  0.3   ALK PHOS U/L  --   --  32*  --  31*   ALT (SGPT) U/L  --   --  16  --  20   AST (SGOT) U/L  --   --  21  --  27   GLUCOSE mg/dL 199* 213* 159*   < > 172*    < > = values in this interval not displayed.     Results from last 7 days   Lab Units 03/04/22  0645 03/02/22  1632   TROPONIN T ng/mL 0.034* 0.024       Results from last 7 days   Lab Units 03/06/22  0654   MAGNESIUM mg/dL 2.1     Component      Latest Ref Rng & Units 3/3/2022 3/4/2022 3/5/2022 3/6/2022   IPF      0.90 - 6.50 % 12.20 (H)  11.70 (H)    Platelets      140 - 450 10*3/mm3 38 (LL)  59 (L)    LDH      135 - 214 U/L 253 (H) 238 (H) 226 (H) 204         I reviewed the patient's new clinical results.        ASSESSMENT:   1. Congestive heart failure, managed by cardiology service.     2. 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   · Stable creatinine 2.7 on 3/5/2022.  Patient is followed by Nephrology service.   · Creatinine 2.92 with BUN 89 on 3/6/2022.  Keep less than 10    3. Severe thrombocytopenia with platelets 38,000 and IPF 12.2% on 3/3/2022. Also elevated LDH at 252. She has no evidence of hemolysis under the microscope and she is not anemic.      I found out on previous CT scan, multiple CT scans reported in 2014 and 2019 and evidence of 2013 reported that the patient has liver cirrhosis by imaging standards. Her daughter seems to not be aware of that completely. I discussed with the patient and her daughter that liver cirrhosis itself could cause thrombocytopenia despite she did not have evidence of splenomegaly based on a previous CT scan examination. Nevertheless,  liver produces thrombopoietin and theoretically that would be low if having liver cirrhosis. She did have borderline low platelets in the 50,000 to 60,000 range in the past couple of years since 2018. On 02/25/2022 this patient had platelets of 46,000 from her kidney doctor’s office. 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 I discussed with the patient and her daughter recommended low-dose prednisone 30 mg daily. I discussed with them the potential side effects that definitely would cause hyperglycemia and she is already a diabetic not that well controlled and also can cause agitation and insomnia as well as side effects to the stomach to the extreme that cause bleeding. Patient is already on Pepcid twice a day which is good to continue. We will evaluate whether the patient is responding to the oral steroid treatment. If her platelets could improve to their baseline level I would be happy and certainly we are not looking to improve platelets to normal level.      · Patient was started on prednisone 30 mg daily on 3/3/2022 with platelets 38,000 and IPF 12.2%.  · On 3/4/2022, patient reports good tolerance to prednisone.  Platelets 44,000.    · On 3/5/2022, improved platelets 59,000.  No spontaneous bleeding such as epistaxis or gum bleeding.  She does have elevated glucose 213.  Continue prednisone 30 mg daily.  If platelets maintains at similar level tomorrow, will decrease to 20 mg daily.  · On 3/6/2022, further improved platelets 90,000.  Prednisone already has been decreased to 20 mg daily.     *  Vitamin B12 deficiency.  · Lab study on 3/4/2022 also showed vitamin B12 at 238 pg/mL.  Patient likely has functional deficiency.  We started patient on vitamin B12 at 1000 mcg intramuscular injection daily for 3 days then oral vitamin B12 at 1000 mcg daily.  Vitamin B12 is very benign and cheap therapy but potentially very useful to promote platelets  production.        PLAN:    1. Decrease prednisone 20 mg daily started earlier today.   2. Continue vitamin B12 injection intramuscular, at 1000 mcg daily for 3 doses, 3/3 today.  It is a benign therapy, and very cheap, potentially useful to help with thrombopoiesis.   3. Continue oral vitamin B12 at 1000 mcg daily.   4. Continue Pepcid twice a day for GI prophylaxis.   5. Continue management for diabetes and CHF per primary team.   6. Pending study results for serum protein immunofixation, free light chains, quantification of immunoglobulin.   7. Monitor labs CBC, IPF and LDH in the morning.   8. After discharge, patient will go to see Dr. Yu at the [facility] because of convenience.       Discussed with the patient and her daughter at bedside.  They voiced appreciation.        Tera Paredes MD PhD  03/06/22  08:49 EST

## 2022-03-07 LAB
ALBUMIN SERPL ELPH-MCNC: 3.2 G/DL (ref 2.9–4.4)
ALBUMIN/GLOB SERPL: 1 {RATIO} (ref 0.7–1.7)
ALPHA1 GLOB SERPL ELPH-MCNC: 0.4 G/DL (ref 0–0.4)
ALPHA2 GLOB SERPL ELPH-MCNC: 1 G/DL (ref 0.4–1)
ANION GAP SERPL CALCULATED.3IONS-SCNC: 14.5 MMOL/L (ref 5–15)
B-GLOBULIN SERPL ELPH-MCNC: 1.1 G/DL (ref 0.7–1.3)
BASOPHILS # BLD AUTO: 0.02 10*3/MM3 (ref 0–0.2)
BASOPHILS NFR BLD AUTO: 0.3 % (ref 0–1.5)
BUN SERPL-MCNC: 86 MG/DL (ref 8–23)
BUN/CREAT SERPL: 31.4 (ref 7–25)
CALCIUM SPEC-SCNC: 10.3 MG/DL (ref 8.2–9.6)
CHLORIDE SERPL-SCNC: 97 MMOL/L (ref 98–107)
CO2 SERPL-SCNC: 26.5 MMOL/L (ref 22–29)
CREAT SERPL-MCNC: 2.74 MG/DL (ref 0.57–1)
DEPRECATED RDW RBC AUTO: 43.9 FL (ref 37–54)
EGFRCR SERPLBLD CKD-EPI 2021: 15.9 ML/MIN/1.73
EOSINOPHIL # BLD AUTO: 0.04 10*3/MM3 (ref 0–0.4)
EOSINOPHIL NFR BLD AUTO: 0.6 % (ref 0.3–6.2)
ERYTHROCYTE [DISTWIDTH] IN BLOOD BY AUTOMATED COUNT: 13.8 % (ref 12.3–15.4)
GAMMA GLOB SERPL ELPH-MCNC: 1 G/DL (ref 0.4–1.8)
GLOBULIN SER-MCNC: 3.5 G/DL (ref 2.2–3.9)
GLUCOSE BLDC GLUCOMTR-MCNC: 114 MG/DL (ref 70–130)
GLUCOSE BLDC GLUCOMTR-MCNC: 194 MG/DL (ref 70–130)
GLUCOSE BLDC GLUCOMTR-MCNC: 198 MG/DL (ref 70–130)
GLUCOSE BLDC GLUCOMTR-MCNC: 286 MG/DL (ref 70–130)
GLUCOSE SERPL-MCNC: 157 MG/DL (ref 65–99)
HCT VFR BLD AUTO: 37.7 % (ref 34–46.6)
HGB BLD-MCNC: 12.5 G/DL (ref 12–15.9)
IGA SERPL-MCNC: 153 MG/DL (ref 64–422)
IGG SERPL-MCNC: 991 MG/DL (ref 586–1602)
IGM SERPL-MCNC: 63 MG/DL (ref 26–217)
IMM GRANULOCYTES # BLD AUTO: 0.01 10*3/MM3 (ref 0–0.05)
IMM GRANULOCYTES NFR BLD AUTO: 0.2 % (ref 0–0.5)
INTERPRETATION SERPL IEP-IMP: ABNORMAL
KAPPA LC FREE SER-MCNC: 65.6 MG/L (ref 3.3–19.4)
KAPPA LC FREE/LAMBDA FREE SER: 1.77 {RATIO} (ref 0.26–1.65)
LABORATORY COMMENT REPORT: ABNORMAL
LAMBDA LC FREE SERPL-MCNC: 37.1 MG/L (ref 5.7–26.3)
LDH SERPL-CCNC: 241 U/L (ref 135–214)
LYMPHOCYTES # BLD AUTO: 1.72 10*3/MM3 (ref 0.7–3.1)
LYMPHOCYTES NFR BLD AUTO: 26.2 % (ref 19.6–45.3)
M PROTEIN SERPL ELPH-MCNC: ABNORMAL G/DL
MAGNESIUM SERPL-MCNC: 2.2 MG/DL (ref 1.7–2.3)
MCH RBC QN AUTO: 29.3 PG (ref 26.6–33)
MCHC RBC AUTO-ENTMCNC: 33.2 G/DL (ref 31.5–35.7)
MCV RBC AUTO: 88.5 FL (ref 79–97)
MONOCYTES # BLD AUTO: 0.55 10*3/MM3 (ref 0.1–0.9)
MONOCYTES NFR BLD AUTO: 8.4 % (ref 5–12)
NEUTROPHILS NFR BLD AUTO: 4.22 10*3/MM3 (ref 1.7–7)
NEUTROPHILS NFR BLD AUTO: 64.3 % (ref 42.7–76)
NRBC BLD AUTO-RTO: 0 /100 WBC (ref 0–0.2)
PLATELET # BLD AUTO: 108 10*3/MM3 (ref 140–450)
PLATELET # BLD AUTO: 108 10*3/MM3 (ref 140–450)
PLATELETS.RETICULATED NFR BLD AUTO: 10.9 % (ref 0.9–6.5)
PMV BLD AUTO: 12.5 FL (ref 6–12)
POTASSIUM SERPL-SCNC: 4 MMOL/L (ref 3.5–5.2)
PROT SERPL-MCNC: 6.7 G/DL (ref 6–8.5)
RBC # BLD AUTO: 4.26 10*6/MM3 (ref 3.77–5.28)
SODIUM SERPL-SCNC: 138 MMOL/L (ref 136–145)
WBC NRBC COR # BLD: 6.56 10*3/MM3 (ref 3.4–10.8)

## 2022-03-07 PROCEDURE — 80048 BASIC METABOLIC PNL TOTAL CA: CPT | Performed by: HOSPITALIST

## 2022-03-07 PROCEDURE — 97530 THERAPEUTIC ACTIVITIES: CPT

## 2022-03-07 PROCEDURE — 63710000001 PREDNISONE PER 1 MG: Performed by: HOSPITALIST

## 2022-03-07 PROCEDURE — 63710000001 INSULIN LISPRO (HUMAN) PER 5 UNITS: Performed by: HOSPITALIST

## 2022-03-07 PROCEDURE — 99233 SBSQ HOSP IP/OBS HIGH 50: CPT | Performed by: INTERNAL MEDICINE

## 2022-03-07 PROCEDURE — 83735 ASSAY OF MAGNESIUM: CPT | Performed by: INTERNAL MEDICINE

## 2022-03-07 PROCEDURE — 82962 GLUCOSE BLOOD TEST: CPT

## 2022-03-07 PROCEDURE — 83615 LACTATE (LD) (LDH) ENZYME: CPT | Performed by: INTERNAL MEDICINE

## 2022-03-07 PROCEDURE — 99232 SBSQ HOSP IP/OBS MODERATE 35: CPT | Performed by: INTERNAL MEDICINE

## 2022-03-07 PROCEDURE — 85025 COMPLETE CBC W/AUTO DIFF WBC: CPT | Performed by: HOSPITALIST

## 2022-03-07 PROCEDURE — 85055 RETICULATED PLATELET ASSAY: CPT | Performed by: INTERNAL MEDICINE

## 2022-03-07 RX ADMIN — LORAZEPAM 0.5 MG: 0.5 TABLET ORAL at 21:27

## 2022-03-07 RX ADMIN — SPIRONOLACTONE 12.5 MG: 25 TABLET ORAL at 08:20

## 2022-03-07 RX ADMIN — FUROSEMIDE 40 MG: 40 TABLET ORAL at 08:21

## 2022-03-07 RX ADMIN — AMLODIPINE BESYLATE 10 MG: 10 TABLET ORAL at 08:21

## 2022-03-07 RX ADMIN — INSULIN GLARGINE-YFGN 15 UNITS: 100 INJECTION, SOLUTION SUBCUTANEOUS at 21:29

## 2022-03-07 RX ADMIN — FUROSEMIDE 40 MG: 40 TABLET ORAL at 16:44

## 2022-03-07 RX ADMIN — TAMSULOSIN HYDROCHLORIDE 0.4 MG: 0.4 CAPSULE ORAL at 21:27

## 2022-03-07 RX ADMIN — MONTELUKAST SODIUM 10 MG: 10 TABLET, FILM COATED ORAL at 08:21

## 2022-03-07 RX ADMIN — FAMOTIDINE 20 MG: 20 TABLET ORAL at 21:27

## 2022-03-07 RX ADMIN — FAMOTIDINE 20 MG: 20 TABLET ORAL at 08:21

## 2022-03-07 RX ADMIN — HYDRALAZINE HYDROCHLORIDE 25 MG: 25 TABLET, FILM COATED ORAL at 14:58

## 2022-03-07 RX ADMIN — GABAPENTIN 200 MG: 100 CAPSULE ORAL at 21:26

## 2022-03-07 RX ADMIN — ISOSORBIDE MONONITRATE 30 MG: 30 TABLET ORAL at 08:21

## 2022-03-07 RX ADMIN — INSULIN LISPRO 5 UNITS: 100 INJECTION, SOLUTION INTRAVENOUS; SUBCUTANEOUS at 16:44

## 2022-03-07 RX ADMIN — PREDNISONE 20 MG: 20 TABLET ORAL at 08:21

## 2022-03-07 RX ADMIN — INSULIN LISPRO 5 UNITS: 100 INJECTION, SOLUTION INTRAVENOUS; SUBCUTANEOUS at 08:21

## 2022-03-07 RX ADMIN — LEVOTHYROXINE SODIUM 25 MCG: 0.03 TABLET ORAL at 08:21

## 2022-03-07 RX ADMIN — INSULIN LISPRO 5 UNITS: 100 INJECTION, SOLUTION INTRAVENOUS; SUBCUTANEOUS at 12:01

## 2022-03-07 RX ADMIN — INSULIN LISPRO 2 UNITS: 100 INJECTION, SOLUTION INTRAVENOUS; SUBCUTANEOUS at 16:45

## 2022-03-07 RX ADMIN — HYDRALAZINE HYDROCHLORIDE 25 MG: 25 TABLET, FILM COATED ORAL at 06:19

## 2022-03-07 RX ADMIN — HYDRALAZINE HYDROCHLORIDE 25 MG: 25 TABLET, FILM COATED ORAL at 21:27

## 2022-03-07 RX ADMIN — ROSUVASTATIN CALCIUM 5 MG: 20 TABLET, FILM COATED ORAL at 21:27

## 2022-03-07 RX ADMIN — ACETAMINOPHEN 1000 MG: 500 TABLET, FILM COATED ORAL at 21:27

## 2022-03-07 RX ADMIN — INSULIN LISPRO 2 UNITS: 100 INJECTION, SOLUTION INTRAVENOUS; SUBCUTANEOUS at 08:22

## 2022-03-07 NOTE — DISCHARGE PLACEMENT REQUEST
"Helena Gruber (89 y.o. Female)             Date of Birth   02/20/1931    Social Security Number       Address   [address] [place] [address]    Home Phone   [phone]    MRN   [id number]       Muslim   Anabaptist    Marital Status                               Admission Date   3/2/22    Admission Type   Emergency    Admitting Provider   Mango Arnold MD    Attending Provider   Mango Arnold MD    Department, Room/Bed   [address], [address]/1       Discharge Date       Discharge Disposition       Discharge Destination                               Attending Provider: Mango Arnold MD    Allergies: Erythromycin, Keflex [Cephalexin], Penicillins, Sulfa Antibiotics    Isolation: None   Infection: None   Code Status: No CPR   Advance Care Planning Activity    Ht: 144.8 cm (57\")   Wt: 68.6 kg (151 lb 3.8 oz)    Admission Cmt: None   Principal Problem: None                Active Insurance as of 3/2/2022     Primary Coverage     Payor Plan Insurance Group Employer/Plan Group    MEDICARE MEDICARE A & B      Payor Plan Address Payor Plan Phone Number Payor Plan Fax Number Effective Dates    [address] [phone]  2/1/1996 - None Entered    [facility] [id number]       Subscriber Name Subscriber Birth Date Member ID       HELENA GRUBER 2/20/1931 [id number]           Secondary Coverage     Payor Plan Insurance Group Employer/Plan Group     FOR LIFE  FOR LIFE  SUP       Payor Plan Address Payor Plan Phone Number Payor Plan Fax Number Effective Dates    [address] [phone]  4/5/2016 - None Entered    [facility] [address]       Subscriber Name Subscriber Birth Date Member ID       HELENA GRUBER 2/20/1931 [id number]                 Emergency Contacts      (Rel.) Home Phone Work Phone Mobile Phone    Radha Hoyos (Daughter) [phone] -- [phone]    BeronicaMargaret (Daughter) [phone] -- [phone]              "

## 2022-03-07 NOTE — PROGRESS NOTES
[facility] Progress Note      Patient Name: Helena Carmen  : 1931  MRN: [id number]  Primary Care Physician:  Mariah Hickman MD  Date of admission: 3/2/2022    Subjective     Interval History:   Breathing is comfortable on 2 L/min  Appetite is fine; no N/V  1.5 L UOP yesterday        Review of Systems:   As noted above    Objective     Vitals:   Temp:  [97.5 °F (36.4 °C)-98 °F (36.7 °C)] 97.6 °F (36.4 °C)  Heart Rate:  [50-64] 57  Resp:  [14-18] 14  BP: (136-156)/(51-67) 149/67  Flow (L/min):  [2] 2    Intake/Output Summary (Last 24 hours) at 3/7/2022 1733  Last data filed at 3/7/2022 1427  Gross per 24 hour   Intake 210 ml   Output 2300 ml   Net -2090 ml       Physical Exam:    Constitutional: Awake, NAD, oriented fully; looks younger than stated age  HEENT: Sclera anicteric, no conjunctival injection  Neck: Supple, trachea at midline, bilateral carotid bruits  Respiratory:  A few crackles in bases, nonlabored respiration on 2 L/min  Cardiovascular: RR, valentín, 2/6M, no rub  Gastrointestinal: BS +, soft, nontender, +distended  : No palpable bladder; Hoffman catheter anchored  Musculoskeletal:  Trace edema; no clubbing or cyanosis  Psychiatric: Flat affect; cooperative; fully oriented  Neurologic: moving all extremities, normal speech and mental status  Skin: Warm and dry     Scheduled Meds:     amLODIPine, 10 mg, Oral, Daily  famotidine, 20 mg, Oral, BID  furosemide, 40 mg, Oral, BID  gabapentin, 200 mg, Oral, Nightly  hydrALAZINE, 25 mg, Oral, Q8H  insulin glargine, 15 Units, Subcutaneous, Nightly  insulin lispro, 0-7 Units, Subcutaneous, TID AC  insulin lispro, 5 Units, Subcutaneous, TID With Meals  isosorbide mononitrate, 30 mg, Oral, Daily  levothyroxine, 25 mcg, Oral, Daily  metoprolol tartrate, 12.5 mg, Oral, Q12H  montelukast, 10 mg, Oral, Daily  predniSONE, 20 mg, Oral, Daily With Breakfast  rosuvastatin, 5 mg, Oral, Nightly  spironolactone, 12.5 mg, Oral,  Daily  tamsulosin, 0.4 mg, Oral, Nightly      IV Meds:        Results Reviewed:   I have personally reviewed the results from the time of this admission to 3/7/2022 17:33 EST     Results from last 7 days   Lab Units 03/07/22  0810 03/06/22  0654 03/05/22  0544 03/04/22  0645 03/03/22  1158 03/02/22  1632   SODIUM mmol/L 138 140 136 139   < > 131*   POTASSIUM mmol/L 4.0 3.6 3.8 4.1   < > 4.8   CHLORIDE mmol/L 97* 95* 92* 96*   < > 94*   CO2 mmol/L 26.5 28.0 29.0 29.0   < > 21.1*   BUN mg/dL 86* 89* 68* 62*   < > 63*   CREATININE mg/dL 2.74* 2.92* 2.70* 2.70*   < > 2.79*   CALCIUM mg/dL 10.3* 9.7* 9.8* 9.9*   < > 9.2   BILIRUBIN mg/dL  --   --   --  0.7  --  0.3   ALK PHOS U/L  --   --   --  32*  --  31*   ALT (SGPT) U/L  --   --   --  16  --  20   AST (SGOT) U/L  --   --   --  21  --  27   GLUCOSE mg/dL 157* 199* 213* 159*   < > 172*    < > = values in this interval not displayed.       Estimated Creatinine Clearance: 11.5 mL/min (A) (by C-G formula based on SCr of 2.74 mg/dL (H)).    Results from last 7 days   Lab Units 03/07/22  0810 03/06/22  0654 03/03/22  1158   MAGNESIUM mg/dL 2.2 2.1 1.9   PHOSPHORUS mg/dL  --   --  4.0       Results from last 7 days   Lab Units 03/04/22  0645   URIC ACID mg/dL 8.9*       Results from last 7 days   Lab Units 03/07/22  0810 03/06/22  0654 03/05/22  0544 03/04/22  0645 03/03/22  1531 03/02/22  1632   WBC 10*3/mm3 6.56 6.75 7.44 6.91  --  5.74   HEMOGLOBIN g/dL 12.5 11.8* 11.8* 12.0  --  12.2   PLATELETS 10*3/mm3 108*  108* 90* 59*  59* 44* 38* 39*             Assessment / Plan     ASSESSMENT:  1.  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  Volume excess, improving;  rising serum calcium with diuresis; resolved hypervolemic hyponatremia with diuresis.   Renal ultrasound essentially negative  2.  CHF, compensated now  3.  Hypertension, exacerbated by hypervolemia, better with diuresis. Spironolactone added 3/5/22.  4.   Multi-valvular heart disease with prior AVR  5.  DM2  6.  Immobility syndrome: She reports walking very little since her arrival  7.  Urinary retention: Suspect due to her recent confinement to bed and ongoing constipation    PLAN:  1.  Oral furosemide, though decrease further to just 40 mg once daily  2.  Discussed with patient and daughter need to tolerate prerenal azotemia to preclude CHF.  3.  Encouraged her to work with PT    Thank you for involving us in the care of Helena Carmen.  Please feel free to call with any questions.    Jordan Vázquez MD  03/07/22  17:33 EST          [facility]  [phone]      Much of this encounter note is an electronic transcription/translation of spoken language to printed text. The electronic translation of spoken language may permit erroneous, or at times, nonsensical words or phrases to be inadvertently transcribed; Although I have reviewed the note for such errors, some may still exist.

## 2022-03-07 NOTE — PROGRESS NOTES
"Daily progress note    Chief complaint  Doing same  No new complaints  Denies any chest pain shortness of breath palpitation  Family at bedside    History of present illness  89-year-old white female with history of diastolic CHF hypertension aortic stenosis s/p AVR pulmonary hypertension atrial fibrillation and chronic kidney disease stage IV presented to [facility] emergency room with increased shortness of breath generalized weakness leg swelling and weight gain.  Patient also have abdominal pain but no nausea vomiting diarrhea.  Patient denies any chest pain fever chills cough.  Patient work-up in ER revealed acute kidney injury and decompression CHF admit for management.  Patient is full code.    REVIEW OF SYSTEMS  Unremarkable except generalized weakness     PHYSICAL EXAM   Blood pressure 149/67, pulse 57, temperature 97.6 °F (36.4 °C), temperature source Oral, resp. rate 14, height 144.8 cm (57\"), weight 68.6 kg (151 lb 3.8 oz), SpO2 96 %.    GENERAL: Awake and alert, nontoxic-appearing  HENT: nares patent  EYES: no scleral icterus  CV: regular rhythm, regular rate  RESPIRATORY: Mild increased work of breathing with crackles in the bases  ABDOMEN: soft, nontender mildly distended bowel sounds positive  MUSCULOSKELETAL: no deformity, 1+ pedal edema in both legs into the thigh  NEURO: alert, moves all extremities, follows commands  SKIN: warm, dry     LAB RESULTS  Lab Results (last 24 hours)     Procedure Component Value Units Date/Time    POC Glucose Once [[id number]]  (Normal) Collected: 03/07/22 1115    Specimen: Blood Updated: 03/07/22 1117     Glucose 114 mg/dL      Comment: Meter: [id number] : [id number] [place] CNA       Lactate Dehydrogenase [[id number]]  (Abnormal) Collected: 03/07/22 0810    Specimen: Blood Updated: 03/07/22 1055      U/L     Basic Metabolic Panel [[id number]]  (Abnormal) Collected: 03/07/22 0810    Specimen: Blood Updated: 03/07/22 0929     Glucose 157 mg/dL  "     BUN 86 mg/dL      Creatinine 2.74 mg/dL      Sodium 138 mmol/L      Potassium 4.0 mmol/L      Chloride 97 mmol/L      CO2 26.5 mmol/L      Calcium 10.3 mg/dL      BUN/Creatinine Ratio 31.4     Anion Gap 14.5 mmol/L      eGFR 15.9 mL/min/1.73      Comment: National Kidney Foundation and American Society of Nephrology (ASN) Task Force recommended calculation based on the Chronic Kidney Disease Epidemiology Collaboration (CKD-EPI) equation refit without adjustment for race.       Narrative:      GFR Normal >60  Chronic Kidney Disease <60  Kidney Failure <15      Magnesium [[id number]]  (Normal) Collected: 03/07/22 0810    Specimen: Blood Updated: 03/07/22 0925     Magnesium 2.2 mg/dL     Immature Platelet Fraction [[id number]]  (Abnormal) Collected: 03/07/22 0810    Specimen: Blood Updated: 03/07/22 0921     IPF 10.90 %      Platelets 108 10*3/mm3     CBC & Differential [[id number]]  (Abnormal) Collected: 03/07/22 0810    Specimen: Blood Updated: 03/07/22 0921    Narrative:      The following orders were created for panel order CBC & Differential.  Procedure                               Abnormality         Status                     ---------                               -----------         ------                     CBC Auto Differential[[id number]]        Abnormal            Final result                 Please view results for these tests on the individual orders.    CBC Auto Differential [[id number]]  (Abnormal) Collected: 03/07/22 0810    Specimen: Blood Updated: 03/07/22 0921     WBC 6.56 10*3/mm3      RBC 4.26 10*6/mm3      Hemoglobin 12.5 g/dL      Hematocrit 37.7 %      MCV 88.5 fL      MCH 29.3 pg      MCHC 33.2 g/dL      RDW 13.8 %      RDW-SD 43.9 fl      MPV 12.5 fL      Platelets 108 10*3/mm3      Neutrophil % 64.3 %      Lymphocyte % 26.2 %      Monocyte % 8.4 %      Eosinophil % 0.6 %      Basophil % 0.3 %      Immature Grans % 0.2 %      Neutrophils, Absolute 4.22 10*3/mm3      Lymphocytes, Absolute  1.72 10*3/mm3      Monocytes, Absolute 0.55 10*3/mm3      Eosinophils, Absolute 0.04 10*3/mm3      Basophils, Absolute 0.02 10*3/mm3      Immature Grans, Absolute 0.01 10*3/mm3      nRBC 0.0 /100 WBC     POC Glucose Once [[id number]]  (Abnormal) Collected: 03/07/22 0613    Specimen: Blood Updated: 03/07/22 0614     Glucose 194 mg/dL      Comment: Meter: [id number] : [id number] [place] Sandra Hilla S NA       POC Glucose Once [[id number]]  (Abnormal) Collected: 03/06/22 2052    Specimen: Blood Updated: 03/06/22 2054     Glucose 377 mg/dL      Comment: Meter: [id number] : [id number] [place] Sandra Lola S NA       POC Glucose Once [[id number]]  (Abnormal) Collected: 03/06/22 1611    Specimen: Blood Updated: 03/06/22 1613     Glucose 263 mg/dL      Comment: Meter: [id number] : [id number] Cm JOHNSON           Imaging Results (Last 24 Hours)     Procedure Component Value Units Date/Time    US Renal Bilateral [[id number]] Collected: 03/06/22 1449     Updated: 03/06/22 1606    Narrative:      RENAL ULTRASOUND     HISTORY: Decreased renal function.     Both kidneys are normal in size, the right measuring 10.4 cm in length  and the left measuring 10.0 cm. There is no evidence of renal  obstruction. There is a probable 2 cm cyst in the upper right kidney as  also noted on an enhanced abdominal CT scan dating back to 08/06/2013.  The urinary bladder is decompressed with a Hoffman catheter in place.     CONCLUSION: Essentially negative renal ultrasound. Small right renal  cyst.     This report was finalized on 3/6/2022 4:03 PM by Dr. Miller Mayorga M.D.                ECG 12 Lead  Component   Ref Range & Units 3/2/22 1559   QT Interval   ms 449              HEART RATE= 50  bpm  RR Interval= 1196  ms  NH Interval= 170  ms  P Horizontal Axis= 36  deg  P Front Axis= 60  deg  QRSD Interval= 109  ms  QT Interval= 449  ms  QRS Axis= 64  deg  T Wave Axis= 58  deg  - OTHERWISE NORMAL ECG -  Sinus rhythm  Low voltage,  precordial leads  Rate slower             Current Facility-Administered Medications:   •  acetaminophen (TYLENOL) tablet 1,000 mg, 1,000 mg, Oral, Q6H PRN, Mango Arnold MD, 1,000 mg at 03/06/22 2138  •  amLODIPine (NORVASC) tablet 10 mg, 10 mg, Oral, Daily, Mango Arnold MD, 10 mg at 03/07/22 0821  •  famotidine (PEPCID) tablet 20 mg, 20 mg, Oral, BID, Mango Arnold MD, 20 mg at 03/07/22 0821  •  furosemide (LASIX) tablet 40 mg, 40 mg, Oral, BID, Beth Butcher MD, 40 mg at 03/07/22 0821  •  gabapentin (NEURONTIN) capsule 200 mg, 200 mg, Oral, Nightly, Jordan Vázquez MD, 200 mg at 03/06/22 2135  •  hydrALAZINE (APRESOLINE) tablet 25 mg, 25 mg, Oral, Q8H, Jordan Vázquez MD, 25 mg at 03/07/22 0619  •  insulin glargine (LANTUS, SEMGLEE) injection 15 Units, 15 Units, Subcutaneous, Nightly, Mango Arnold MD, 15 Units at 03/06/22 2135  •  insulin lispro (ADMELOG) injection 0-7 Units, 0-7 Units, Subcutaneous, TID AC, Mango Arnold MD, 2 Units at 03/07/22 0822  •  insulin lispro (ADMELOG) injection 5 Units, 5 Units, Subcutaneous, TID With Meals, Mango Arnold MD, 5 Units at 03/07/22 1201  •  isosorbide mononitrate (IMDUR) 24 hr tablet 30 mg, 30 mg, Oral, Daily, Mango Arnold MD, 30 mg at 03/07/22 0821  •  levothyroxine (SYNTHROID, LEVOTHROID) tablet 25 mcg, 25 mcg, Oral, Daily, Mango Arnold MD, 25 mcg at 03/07/22 0821  •  LORazepam (ATIVAN) tablet 0.5 mg, 0.5 mg, Oral, Q6H PRN, Mango Arnold MD, 0.5 mg at 03/06/22 2138  •  metoprolol tartrate (LOPRESSOR) tablet 12.5 mg, 12.5 mg, Oral, Q12H, Mango Arnold MD, 12.5 mg at 03/06/22 2136  •  montelukast (SINGULAIR) tablet 10 mg, 10 mg, Oral, Daily, Mango Arnold MD, 10 mg at 03/07/22 0821  •  predniSONE (DELTASONE) tablet 20 mg, 20 mg, Oral, Daily With Breakfast, Mango Arnold MD, 20 mg at 03/07/22 0821  •  rosuvastatin (CRESTOR) tablet 5 mg, 5 mg, Oral, Nightly, Mango Arnold MD, 5 mg at 03/06/22 2136  •  [COMPLETED] Insert peripheral IV, , , Once  **AND** sodium chloride 0.9 % flush 10 mL, 10 mL, Intravenous, PRN, Tomás Garnett MD, 10 mL at 03/06/22 0816  •  spironolactone (ALDACTONE) tablet 12.5 mg, 12.5 mg, Oral, Daily, Beth Butcher MD, 12.5 mg at 03/07/22 0820  •  tamsulosin (FLOMAX) 24 hr capsule 0.4 mg, 0.4 mg, Oral, Nightly, Kadeem Rivas MD, 0.4 mg at 03/06/22 2135     ASSESSMENT  Acute on chronic diastolic CHF  Acute kidney injury  Chronic kidney disease stage IV  Paroxysmal atrial fibrillation  Aortic stenosis s/p AVR  Diabetes mellitus  Hypertension  Hyperlipidemia  Thrombocytopenia  Pulmonary hypertension  Gastroesophageal reflux disease    PLAN  CPM  Change diuretics to p.o.   Strict I's and O's and daily weight  Nephrology and pulmonary consult appreciated  Cardiology to follow patient  Adjust home medications  Stress ulcer DVT prophylaxis  Supportive care  PT/OT  DNR  Discussed with nursing staff  Discharge planning    KADEEM RIVAS MD

## 2022-03-07 NOTE — PROGRESS NOTES
[facility] GROUP INPATIENT PROGRESS NOTE    Length of Stay:  5 days    CHIEF COMPLAINT: Thrombocytopenia      SUBJECTIVE: Patient is awake, alert.  She reports some mild constipation, did have a bowel movement yesterday.  She denies any significant bleeding issues.      ROS:  Review of Systems   A comprehensive review of systems was obtained with pertinent positive findings as noted in the interval history above.  All other systems negative.    OBJECTIVE:  Vitals:    03/06/22 2306 03/07/22 0614 03/07/22 0619 03/07/22 0731   BP: 136/67  156/57 153/52   BP Location: Left arm   Left arm   Patient Position: Lying   Lying   Pulse: 52  50 50   Resp: 18      Temp: 97.5 °F (36.4 °C)   97.6 °F (36.4 °C)   TempSrc: Oral   Oral   SpO2: 94%   99%   Weight:  68.6 kg (151 lb 3.8 oz)     Height:             PHYSICAL EXAMINATION:  General: Elderly female lying in bed no distress  Chest/Lungs: Clear to auscultation bilaterally anteriorly  Heart: Regular rate and rhythm  Abdomen/GI: Soft nontender nondistended bowel sounds present  Extremities: No edema    DIAGNOSTIC DATA:  Results Review:     I reviewed the patient's new clinical results.    Results from last 7 days   Lab Units 03/06/22  0654 03/05/22  0544 03/04/22  0645   WBC 10*3/mm3 6.75 7.44 6.91   HEMOGLOBIN g/dL 11.8* 11.8* 12.0   HEMATOCRIT % 36.7 36.4 38.1   PLATELETS 10*3/mm3 90* 59*  59* 44*      Results from last 7 days   Lab Units 03/06/22  0654 03/05/22  0544 03/04/22  0645 03/03/22  1158 03/02/22  1632   SODIUM mmol/L 140 136 139   < > 131*   POTASSIUM mmol/L 3.6 3.8 4.1   < > 4.8   CHLORIDE mmol/L 95* 92* 96*   < > 94*   CO2 mmol/L 28.0 29.0 29.0   < > 21.1*   BUN mg/dL 89* 68* 62*   < > 63*   CREATININE mg/dL 2.92* 2.70* 2.70*   < > 2.79*   CALCIUM mg/dL 9.7* 9.8* 9.9*   < > 9.2   BILIRUBIN mg/dL  --   --  0.7  --  0.3   ALK PHOS U/L  --   --  32*  --  31*   ALT (SGPT) U/L  --   --  16  --  20   AST (SGOT) U/L  --   --  21  --  27   GLUCOSE mg/dL 199* 213*  159*   < > 172*    < > = values in this interval not displayed.      Lab Results   Component Value Date    NEUTROABS 4.47 03/06/2022         Results from last 7 days   Lab Units 03/06/22  0654   MAGNESIUM mg/dL 2.1           Assessment/Plan   ASSESSMENT/PLAN:  This is a 89 y.o. female with:     *Thrombocytopenia  · Patient with apparent longstanding history of thrombocytopenia  · Previous CT scan with suggestion of chronic liver disease/cirrhotic liver morphology, last CT 4/15/2019 with normal spleen  · Platelet count 3/22/2019 was 52,000 and on 1/26/2022 was 60,000  · On admission 3/2/2022, platelet count 39,000  · Additional labs 3/3/2022 with IPF elevated at 12.2% indicative of peripheral destructive process and has remained elevated in the 10-12% range.  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  Unclear whether this is related to steroids, B12 replacement, or improvement in CHF/volume overload.  Continue prednisone at current dose for now and we will consider further taper in the next few days.  Recheck CT abdomen and pelvis to assess cirrhosis/possible splenomegaly.    *Borderline B12 deficiency  · B12 level on 3/4/2022 was 238  · Patient initiated B12 1000 mcg IM injection daily x3 days and oral B12 1000 mcg daily    *NADER/CKD3/4  · Baseline creatinine 1.5-2  · Creatinine on admission 3/2/2022 was 2.79  · Nephrology is  following  · Creatinine today 2.74    *Acute on chronic diastolic CHF  · History of severe aortic stenosis status post aortic valve replacement in June 2013  · Patient admitted with progressive generalized weakness and dyspnea on exertion  · Patient receiving diuretics with Lasix 40 mg p.o. twice daily currently    *Diabetes mellitus type 2  · Exacerbated by steroids  · Currently receiving Admelog 5 units 3 times daily with meals and additional sliding scale insulin.    *Possible chronic liver disease  · Prior CTs with notation of cirrhotic liver morphology  · CT abdomen pelvis 4/15/2019 showed liver with a mildly shrunken appearance concerning for chronic liver disease.  Spleen however was normal in size.  · LFTs normal on 3/2/2022  · Recheck CT abdomen and pelvis    Plan:  1. Continue prednisone 20 mg daily  2. Check CT abdomen pelvis without contrast  3. Daily CBC/IPF    Discussed with patient at bedside.  Discussed with Dr. Paredes.    The patient and all of the above issues were new to me today.  Reviewed multiple records including progress notes, laboratory studies, scan results as outlined above.           Pablo Sherman MD

## 2022-03-07 NOTE — CASE MANAGEMENT/SOCIAL WORK
Continued Stay Note  [facility]     Patient Name: Helena Carmen  MRN: [id number]  Today's Date: 3/7/2022    Admit Date: 3/2/2022     Discharge Plan     Row Name 03/07/22 1439       Plan    Plan SNF - referrals pending vs. home with [facility] following    Plan Comments Spoke with Soniya/Farida, no beds available this week.  Spoke with patient and daughter at Bedside.  Daughter would like referral to [facility] - spoke with Gina and she will evaluate.  Packet in CCP office.  BHumeniuk RN                Expected Discharge Date and Time     Expected Discharge Date Expected Discharge Time    Mar 8, 2022             Becky S. Humeniuk, RN

## 2022-03-07 NOTE — PLAN OF CARE
Goal Outcome Evaluation:  Plan of Care Reviewed With: patient, daughter           Outcome Evaluation: Denies SOA. 02 2L NC. CXR and Renal US completed. Lasix oral. Blood sugars noted and treated with SS insulin. Telemetry SB/SR.w/ BBB.

## 2022-03-07 NOTE — PLAN OF CARE
Goal Outcome Evaluation:            Pt resting in bed quietly. Medicated with tylenol once for generalized aching. Pt rolls self in bed. F/c in place to bsd.

## 2022-03-07 NOTE — PLAN OF CARE
Pt Goal Outcome Evaluation:         Pt up on bsc attempting to have BM but unable today. Desat during STS to rwx  on 2.5 L. O2 increased to 5 L for STS and pivot to eob. Seated rest with sidestepping 2 steps to HOB. Min asst for STS with rwx. CGA for sidestepping. Pt fatigued quickly today. Very labored with all activity today. Marked fall risk.

## 2022-03-07 NOTE — PROGRESS NOTES
"Adult Nutrition  Assessment/PES    Patient Name:  Helena Carmen  YOB: 1931  MRN: [id number]  Admit Date:  3/2/2022    Assessment Date:  3/7/2022    Comments:  Nutrition follow up for diet education.  S/p CXR and renal US yesterday.  Plans for rehab at d/c or home with HH and daughter.    Spoke with patient and daughter at bedside.  Daughter does the cooking for patient.  She reports she salts everything.  Discussed heart healthy diet.  Discussed foods to choose/avoid, heart healthy cooking methods, seasonings, etc.  Gave handouts for home use.    RD to continue to follow.     Reason for Assessment     Row Name 03/07/22 1629          Reason for Assessment    Reason For Assessment physician consult     Diagnosis pulmonary disease;cardiac disease;diabetes diagnosis/complications;renal disease;other (see comments);hematological/related complications  pulm HTN, CHF, AVR, DM2, HTN, CKD3, thrombocytopenia; adm with CHF     Identified At Risk by Screening Criteria need for education                Nutrition/Diet History     Row Name 03/07/22 1621          Nutrition/Diet History    Typical Intake (Food/Fluid/EN/PN) no PO intake available; dtr cooks for patient, discussed heart healthy diet with her                Anthropometrics     Row Name 03/07/22 1620          Anthropometrics    Height 144.8 cm (57.01\")     Weight 68.6 kg (151 lb 3.8 oz)     Weight for Calculation 68.6 kg (151 lb 3.8 oz)            Ideal Body Weight (IBW)    Retired Ideal Body Weight (IBW) (kg) 39.02     Retired % Ideal Body Weight 175.81            Retired Body Mass Index (BMI)    Retired BMI (kg/m2) 32.79                Labs/Tests/Procedures/Meds     Row Name 03/07/22 0228          Labs/Procedures/Meds    Lab Results Reviewed reviewed, pertinent     Lab Results Comments Gluc, Creat, BUN, Ca, GFR, Platelets            Diagnostic Tests/Procedures    Diagnostic Test/Procedure Reviewed reviewed, pertinent     Diagnostic Test/Procedures " "Comments s/p CXR and renal US yesterday            Medications    Pertinent Medications Reviewed reviewed, pertinent     Pertinent Medications Comments pepcid, lasix, lantus, admelog, synthroid, prednisone                Physical Findings     Row Name 03/07/22 1629          Physical Findings    Overall Physical Appearance obese; 2L O2; B=20, intact; trace edema                Estimated/Assessed Needs - Anthropometrics     Row Name 03/07/22 1629          Anthropometrics    Height 144.8 cm (57.01\")     Weight 68.6 kg (151 lb 3.8 oz)     Weight for Calculation 68.6 kg (151 lb 3.8 oz)            Estimated/Assessed Needs    Additional Documentation KCAL/KG (Group);Protein Requirements (Group);Fluid Requirements (Group)            KCAL/KG    KCAL/KG 20 Kcal/Kg (kcal);25 Kcal/Kg (kcal)     20 Kcal/Kg (kcal) 1372     25 Kcal/Kg (kcal) 1715            Protein Requirements    Weight Used For Protein Calculations 68.6 kg (151 lb 3.8 oz)     Est Protein Requirement Amount (gms/kg) 0.8 gm protein     Estimated Protein Requirements (gms/day) 54.88            Fluid Requirements    Fluid Requirements (mL/day) 1400     Estimated Fluid Requirement Method RDA Method     RDA Method (mL) 1400                Nutrition Prescription Ordered     Row Name 03/07/22 1630          Nutrition Prescription PO    Current PO Diet Regular     Common Modifiers Cardiac;Consistent Carbohydrate                Evaluation of Received Nutrient/Fluid Intake     Row Name 03/07/22 1630          PO Evaluation    Number of Days PO Intake Evaluated Insufficient Data     % PO Intake assistance with meals                     Problem/Interventions:   Problem 1     Row Name 03/07/22 1637          Nutrition Diagnoses Problem 1    Problem 1 Knowledge Deficit     Etiology (related to) Medical Diagnosis     Cardiac CHF     Signs/Symptoms (evidenced by) Reported  Information Deficit                      Intervention Goal     Row Name 03/07/22 1638          Intervention " Goal    General Maintain nutrition;Disease management/therapy;Meet nutritional needs for age/condition     PO Establish PO;Tolerate PO;PO intake (%)     PO Intake % 75 %     Weight Appropriate weight loss                Nutrition Intervention     Row Name 03/07/22 1638          Nutrition Intervention    RD/Tech Action Follow Tx progress;Care plan reviewd                  Education/Evaluation     Row Name 03/07/22 1638          Education    Education Provided education regarding     Education Topics CHF;Na+;Low fat;Cardiac heart health     Na+ (mg/day) 2000 mg/day            Monitor/Evaluation    Monitor Per protocol;PO intake;Pertinent labs;Weight;Symptoms                 Electronically signed by:  Cassie Velarde RD  03/07/22 16:39 EST

## 2022-03-07 NOTE — CASE MANAGEMENT/SOCIAL WORK
Continued Stay Note  [facility]     Patient Name: Helena Carmen  MRN: [id number]  Today's Date: 3/7/2022    Admit Date: 3/2/2022     Discharge Plan     Row Name 03/07/22 1014       Plan    Plan [facility] - referral pending vs. home with [facility] following    Plan Comments PT recommends SNF.  Spoke with patient and daughter Radha at bedside.  Patient has been to [facility] in the past and is agreeable to referral there.  Left message for Soniya.  CCP will continue to follow.  BHumeniuk RN                       Expected Discharge Date and Time     Expected Discharge Date Expected Discharge Time    Mar 8, 2022             Becky S. Humeniuk, RN

## 2022-03-07 NOTE — PROGRESS NOTES
[facility] INPATIENT PROGRESS NOTE         [address]    3/7/2022      PATIENT IDENTIFICATION:  Name: Helena Carmen ADMIT: 3/2/2022   : 1931  PCP: Mariah Hickman MD    MRN: [id number] LOS: 5 days   AGE/SEX: 89 y.o. female  ROOM: [facility]                     LOS 5    Reason for visit: Pulmonary hypertension and CHF      SUBJECTIVE:      No chest pain, worsening shortness of breath or wheezing.  Discussed with family at bedside.    Objective   OBJECTIVE:    Vital Sign Min/Max for last 24 hours  Temp  Min: 97.5 °F (36.4 °C)  Max: 98 °F (36.7 °C)   BP  Min: 136/67  Max: 156/57   Pulse  Min: 50  Max: 64   Resp  Min: 14  Max: 18   SpO2  Min: 94 %  Max: 99 %   No data recorded   Weight  Min: 68.6 kg (151 lb 3.8 oz)  Max: 68.6 kg (151 lb 3.8 oz)                         Body mass index is 32.73 kg/m².    Intake/Output Summary (Last 24 hours) at 3/7/2022 1102  Last data filed at 3/7/2022 0437  Gross per 24 hour   Intake 210 ml   Output 1500 ml   Net -1290 ml         Exam:  GEN:  No distress, appears stated age  EYES:   PERRL, anicteric sclerae  ENT:    External ears/nose normal, OP clear  NECK:  No adenopathy, midline trachea  LUNGS: Normal chest on inspection, palpation and diminished on auscultation  CV:  Normal S1S2, without murmur  ABD:  Nontender, nondistended, no hepatosplenomegaly, +BS  EXT:  Trace edema.  No cyanosis or clubbing.  No mottling and normal cap refill.    Assessment     Scheduled meds:  amLODIPine, 10 mg, Oral, Daily  famotidine, 20 mg, Oral, BID  furosemide, 40 mg, Oral, BID  gabapentin, 200 mg, Oral, Nightly  hydrALAZINE, 25 mg, Oral, Q8H  insulin glargine, 15 Units, Subcutaneous, Nightly  insulin lispro, 0-7 Units, Subcutaneous, TID AC  insulin lispro, 5 Units, Subcutaneous, TID With Meals  isosorbide mononitrate, 30 mg, Oral, Daily  levothyroxine, 25 mcg, Oral, Daily  metoprolol tartrate, 12.5 mg, Oral, Q12H  montelukast, 10 mg, Oral, Daily  predniSONE, 20 mg, Oral,  Daily With Breakfast  rosuvastatin, 5 mg, Oral, Nightly  spironolactone, 12.5 mg, Oral, Daily  tamsulosin, 0.4 mg, Oral, Nightly      IV meds:                         Data Review:  Results from last 7 days   Lab Units 03/07/22  0810 03/06/22  0654 03/05/22  0544 03/04/22  0645 03/03/22  1158   SODIUM mmol/L 138 140 136 139 137   POTASSIUM mmol/L 4.0 3.6 3.8 4.1 4.4   CHLORIDE mmol/L 97* 95* 92* 96* 98   CO2 mmol/L 26.5 28.0 29.0 29.0 26.0   BUN mg/dL 86* 89* 68* 62* 56*   CREATININE mg/dL 2.74* 2.92* 2.70* 2.70* 2.68*   GLUCOSE mg/dL 157* 199* 213* 159* 167*   CALCIUM mg/dL 10.3* 9.7* 9.8* 9.9* 9.4         Estimated Creatinine Clearance: 11.5 mL/min (A) (by C-G formula based on SCr of 2.74 mg/dL (H)).  Results from last 7 days   Lab Units 03/07/22  0810 03/06/22  0654 03/05/22  0544 03/04/22  0645 03/03/22  1531 03/02/22  1632   WBC 10*3/mm3 6.56 6.75 7.44 6.91  --  5.74   HEMOGLOBIN g/dL 12.5 11.8* 11.8* 12.0  --  12.2   PLATELETS 10*3/mm3 108*  108* 90* 59*  59* 44* 38* 39*         Results from last 7 days   Lab Units 03/04/22  0645 03/02/22  1632   ALT (SGPT) U/L 16 20   AST (SGOT) U/L 21 27                 Glucose   Date/Time Value Ref Range Status   03/07/2022 0613 194 (H) 70 - 130 mg/dL Final     Comment:     Meter: [id number] : [id number] Jeffery JOHNSON   03/06/2022 2052 377 (H) 70 - 130 mg/dL Final     Comment:     Meter: [id number] : [id number] Olgaangelo HILL NA   03/06/2022 1611 263 (H) 70 - 130 mg/dL Final     Comment:     Meter: [id number] : [id number] Cm Swapna NA   03/06/2022 1104 195 (H) 70 - 130 mg/dL Final     Comment:     Meter: [id number] : [id number] Cm Swapna NA   03/06/2022 0616 207 (H) 70 - 130 mg/dL Final     Comment:     Meter: [id number] : [id number] Conrado JOHNSON   03/05/2022 2057 351 (H) 70 - 130 mg/dL Final     Comment:     Meter: [id number] : [id number] Conrado Elenajesicagriffin    03/05/2022 1956 365 (H) 70 - 130 mg/dL Final      Comment:     Meter: [id number] : [id number] Conrado JOHNSON     Chest x-ray 3/6 reviewed shows cardiomegaly with improvement in vascular congestion and resolution of previous small pleural effusion compared to previous imaging.        Microbiology reviewed          Active Hospital Problems    Diagnosis  POA   • Other cirrhosis of liver (HCC) [K74.69]  Unknown   • CHF (congestive heart failure) (HCC) [I50.9]  Yes   • Thrombocytopenia (HCC) [D69.6]  Yes      Resolved Hospital Problems   No resolved problems to display.         ASSESSMENT:    Pulmonary hypertension: WHO group II  Acute on chronic diastolic CHF  Aortic valve replacement  Type 2 diabetes  Hypertension  Thrombocytopenia  Acute kidney injury      PLAN:    Patient's pulmonary hypertension is secondary to left-sided disease and post capillary pulmonary hypertension.  No indication for right heart catheterization in this patient.  No indication for pulmonary arterial hypertensive medications.  Mainstay treatment as blood pressure control and diuretics for CHF.  Improvement with diuresis.  Wean oxygen as able.  Repeat x-ray for follow-up post diuresis show significant improvement in vascular congestion.  Discussed with family at bedside.  Questions answered their satisfaction.        Juancarlos Siddiqui MD  Pulmonary and Critical Care Medicine  [facility], [facility]  3/7/2022    11:02 EST

## 2022-03-07 NOTE — PROGRESS NOTES
LOS: 5 days   Patient Care Team:  Mariah Hickman MD as PCP - General (Family Medicine)  Beth Butcher MD as Consulting Physician (Cardiology)    Chief Complaint: Follow-up acute on chronic diastolic CHF, hypertension, acute on chronic kidney disease, pulmonary hypertension.    Interval History: Doing slightly better.  She still is weak.  Her breathing has improved, although she still does have shortness of breath with exertion or movement.    Vital Signs:  Temp:  [97.5 °F (36.4 °C)-98 °F (36.7 °C)] 97.6 °F (36.4 °C)  Heart Rate:  [50-64] 57  Resp:  [14-18] 14  BP: (136-156)/(51-67) 149/67    Intake/Output Summary (Last 24 hours) at 3/7/2022 1645  Last data filed at 3/7/2022 1427  Gross per 24 hour   Intake 210 ml   Output 2300 ml   Net -2090 ml       Physical Exam:   General Appearance:    No acute distress, alert and oriented x4   Lungs:     Clear to auscultation bilaterally     Heart:    Regular rhythm and normal rate.  II/VI SM throughout.   Abdomen:     Soft, nontender, nondistended.    Extremities:   Trace edema.     Results Review:    Results from last 7 days   Lab Units 03/07/22  0810   SODIUM mmol/L 138   POTASSIUM mmol/L 4.0   CHLORIDE mmol/L 97*   CO2 mmol/L 26.5   BUN mg/dL 86*   CREATININE mg/dL 2.74*   GLUCOSE mg/dL 157*   CALCIUM mg/dL 10.3*     Results from last 7 days   Lab Units 03/04/22  0645 03/02/22  1632   TROPONIN T ng/mL 0.034* 0.024     Results from last 7 days   Lab Units 03/07/22  0810   WBC 10*3/mm3 6.56   HEMOGLOBIN g/dL 12.5   HEMATOCRIT % 37.7   PLATELETS 10*3/mm3 108*  108*         Results from last 7 days   Lab Units 03/04/22  0645   CHOLESTEROL mg/dL 114     Results from last 7 days   Lab Units 03/07/22  0810   MAGNESIUM mg/dL 2.2     Results from last 7 days   Lab Units 03/04/22  0645   CHOLESTEROL mg/dL 114   TRIGLYCERIDES mg/dL 148   HDL CHOL mg/dL 39*   LDL CHOL mg/dL 50       I reviewed the patient's new clinical results.        Assessment:  1.  Acute on chronic  diastolic CHF  2.  Acute kidney injury with stage III-IV chronic kidney disease  3.  Status post tissue aortic valve replacement  4.  Thrombocytopenia, on steroid treatment  5.  Vitamin B12 deficiency  6.  Pulmonary hypertension  7.  Hypertension  8.  Diabetes  9.  Deconditioning    Plan:  -Seems to be stabilizing.  Continue Lasix 40 mg p.o. bid.  Nephrology is also following.    -On steroid therapy for thrombocytopenia, improved.    -No ACE inhibitor or Entresto because of renal function.    -Continue amlodipine, hydralazine, isosorbide, spironolactone, and metoprolol for blood pressure control.    Og Grimaldo MD  03/07/22  16:45 EST

## 2022-03-07 NOTE — THERAPY TREATMENT NOTE
Patient Name: Helena Carmen  : 1931    MRN: [id number]                              Today's Date: 3/7/2022       Admit Date: 3/2/2022    Visit Dx:     ICD-10-CM ICD-9-CM   1. Acute on chronic combined systolic and diastolic congestive heart failure (HCC)  I50.43 428.43     428.0   2. Chronic kidney disease, unspecified CKD stage  N18.9 585.9     Patient Active Problem List   Diagnosis   • Aortic valve stenosis   • Diastolic dysfunction   • Fatigue   • MI (mitral incompetence)   • PVC (premature ventricular contraction)   • Legally blind   • Essential hypertension   • Hypertriglyceridemia   • Pulmonary hypertension (HCC)   • Paroxysmal atrial fibrillation (HCC)   • Breast screening   • Abdominal pain, right lower quadrant   • Allergic rhinitis   • Anxiety   • Chronic kidney disease   • Chronic pain disorder   • Degeneration of lumbar intervertebral disc   • Diabetes mellitus (HCC)   • Esophageal reflux   • Gastroparesis   • Hiatal hernia   • Iatrogenic hypothyroidism   • Long term (current) use of opiate analgesic   • Macular degeneration   • Malignant neoplasm of uterus (HCC)   • Osteoarthritis of knee   • Peripheral nerve disease   • Secondary hyperparathyroidism (HCC)   • Thrombocytopenia (HCC)   • Mitral valve regurgitation   • History of aortic valve replacement   • Nonrheumatic tricuspid valve regurgitation   • Elevated serum creatinine   • Chronic diastolic congestive heart failure (HCC)   • CHF (congestive heart failure) (HCC)   • Other cirrhosis of liver (HCC)     Past Medical History:   Diagnosis Date   • Aortic valve stenosis     s/p tissue AVR   • Back pain    • Colitis due to Clostridioides difficile 2022   • Diastolic dysfunction     Grade 2 per echocardiogram    • Diverticulosis    • Exertional shortness of breath    • Hiatal hernia    • Hyperlipidemia    • Hypertension    • Hyperthyroidism    • Hypertriglyceridemia 2018   • Hypothyroidism    • Left ventricular hypertrophy    •  Legally blind    • Macular degeneration    • Mitral regurgitation    • Osteoarthritis of hip    • Pancreatitis 1/26/2022   • Paroxysmal atrial fibrillation (HCC)    • Premature ventricular contractions    • Pulmonary hypertension (HCC)    • Renal insufficiency syndrome    • Type 2 diabetes mellitus (HCC)      Past Surgical History:   Procedure Laterality Date   • AORTIC VALVE REPAIR/REPLACEMENT     • CATARACT EXTRACTION      1970, 1999   • ENDOSCOPY  08/15/2014    no gross lesions in stomach/duodenum, erythrematous mucosa in stomach   • HYSTERECTOMY  2007   • STERNOTOMY        General Information     Row Name 03/07/22 1545          Physical Therapy Time and Intention    Document Type therapy note (daily note)  -SV     Mode of Treatment individual therapy;physical therapy  -SV     Row Name 03/07/22 1545          General Information    Patient Profile Reviewed yes  -SV           User Key  (r) = Recorded By, (t) = Taken By, (c) = Cosigned By    Initials Name Provider Type    SV Nini Mckeon, PT Physical Therapist               Mobility     Row Name 03/07/22 1700          Bed Mobility    Scooting/Bridging [place] (Bed Mobility) maximum assist (25% patient effort);2 person assist  -SV     Sit-Supine [place] (Bed Mobility) moderate assist (50% patient effort)  -SV     Assistive Device (Bed Mobility) bed rails  -SV     Row Name 03/07/22 1700          Bed-Chair Transfer    Bed-Chair [place] (Transfers) minimum assist (75% patient effort)  -SV     Row Name 03/07/22 1700          Sit-Stand Transfer    Sit-Stand [place] (Transfers) minimum assist (75% patient effort)  -SV     Assistive Device (Sit-Stand Transfers) walker, front-wheeled  -SV     Comment, (Sit-Stand Transfer) Pt stood for 10 seconds with cga PLB  -SV     Row Name 03/07/22 1700          Gait/Stairs (Locomotion)    [place] Level (Gait) contact guard;2 person assist  -SV     Assistive Device (Gait) walker, front-wheeled  -SV      Distance in Feet (Gait) 2 sidesteps toward HOB  -SV           User Key  (r) = Recorded By, (t) = Taken By, (c) = Cosigned By    Initials Name Provider Type    Nini Mazariegos, PT Physical Therapist               Obj/Interventions     Row Name 03/07/22 1701          Motor Skills    Therapeutic Exercise --  encouraged PLB and improved posture  -SV     Row Name 03/07/22 1701          Balance    Comment, Balance sitting on bsc prior to PT , 3 minutes sitting eob, static standing cga rwx 10-15 sec  -SV           User Key  (r) = Recorded By, (t) = Taken By, (c) = Cosigned By    Initials Name Provider Type    Nini Mazariegos, PT Physical Therapist               Goals/Plan    No documentation.                Clinical Impression     Row Name 03/07/22 1702          Vital Signs    Pre SpO2 (%) 90  2.5 L  -SV     Intra SpO2 (%) 79  increased to 5 L  -SV     O2 Delivery Intra Treatment supplemental O2  -SV     Post SpO2 (%) 92  2.5 L  -SV     O2 Delivery Post Treatment supplemental O2  -SV     Pre Patient Position Sitting  -SV     Intra Patient Position Standing  -SV     Post Patient Position Supine  -SV     Row Name 03/07/22 1702          Positioning and Restraints    Pre-Treatment Position bedside commode  -SV     Post Treatment Position bed  -SV     In Bed notified nsg;call light within reach;encouraged to call for assist;exit alarm on;fowlers  -SV           User Key  (r) = Recorded By, (t) = Taken By, (c) = Cosigned By    Initials Name Provider Type    Nini Mazariegos, PT Physical Therapist               Outcome Measures     Row Name 03/07/22 1704          How much help from another person do you currently need...    Turning from your back to your side while in flat bed without using bedrails? 3  -SV     Moving from lying on back to sitting on the side of a flat bed without bedrails? 3  -SV     Moving to and from a bed to a chair (including a wheelchair)? 3  -SV     Standing up from a chair using your arms (e.g.,  wheelchair, bedside chair)? 3  -SV     Climbing 3-5 steps with a railing? 1  -SV     To walk in hospital room? 2  -SV     AM-PAC 6 Clicks Score (PT) 15  -SV     Row Name 03/07/22 1704          Functional Assessment    Outcome Measure Options AM-PAC 6 Clicks Basic Mobility (PT)  -SV           User Key  (r) = Recorded By, (t) = Taken By, (c) = Cosigned By    Initials Name Provider Type    SV Nini Mckeon, PT Physical Therapist                             Physical Therapy Education                 Title: PT OT SLP Therapies (In Progress)     Topic: Physical Therapy (In Progress)     Point: Mobility training (In Progress)     Learning Progress Summary           Patient Acceptance, E, NR by  at 3/7/2022 1705    Acceptance, E, VU,NR by  at 3/5/2022 1233   Family Acceptance, E, VU,NR by  at 3/5/2022 1233                   Point: Home exercise program (In Progress)     Learning Progress Summary           Patient Acceptance, E, NR by  at 3/7/2022 1705    Acceptance, E, VU,NR by  at 3/5/2022 1233   Family Acceptance, E, VU,NR by  at 3/5/2022 1233                   Point: Body mechanics (In Progress)     Learning Progress Summary           Patient Acceptance, E, NR by  at 3/7/2022 1705    Acceptance, E, VU,NR by  at 3/5/2022 1233   Family Acceptance, E, VU,NR by  at 3/5/2022 1233                   Point: Precautions (In Progress)     Learning Progress Summary           Patient Acceptance, E, NR by  at 3/7/2022 1705    Acceptance, E, VU,NR by  at 3/5/2022 1233   Family Acceptance, E, VU,NR by  at 3/5/2022 1233                               User Key     Initials Effective Dates Name Provider Type Discipline     06/16/21 -  Nini Mckeon, PT Physical Therapist PT     06/16/21 -  Holland Giron, PT Physical Therapist PT              PT Recommendation and Plan           Time Calculation:    PT Charges     Row Name 03/07/22 1548             Time Calculation    Start Time 1445  -      Stop Time  1500  -SV      Time Calculation (min) 15 min  -SV      PT Received On 03/07/22  -SV      PT - Next Appointment 03/08/22  -SV            User Key  (r) = Recorded By, (t) = Taken By, (c) = Cosigned By    Initials Name Provider Type    SV Nini Mckeon, PT Physical Therapist              Therapy Charges for Today     Code Description Service Date Service Provider Modifiers Qty    [id number]  PT THERAPEUTIC ACT EA 15 MIN 3/7/2022 Nini Mckeon, PT GP 1          PT G-Codes  Outcome Measure Options: AM-PAC 6 Clicks Basic Mobility (PT)  AM-PAC 6 Clicks Score (PT): 15  AM-PAC 6 Clicks Score (OT): 15    Nini Mckeon PT  3/7/2022

## 2022-03-08 ENCOUNTER — APPOINTMENT (OUTPATIENT)
Dept: CT IMAGING | Facility: HOSPITAL | Age: 87
End: 2022-03-08

## 2022-03-08 DIAGNOSIS — D69.6 THROMBOCYTOPENIA: Primary | ICD-10-CM

## 2022-03-08 LAB
ANION GAP SERPL CALCULATED.3IONS-SCNC: 14 MMOL/L (ref 5–15)
BASOPHILS # BLD AUTO: 0.01 10*3/MM3 (ref 0–0.2)
BASOPHILS NFR BLD AUTO: 0.2 % (ref 0–1.5)
BUN SERPL-MCNC: 101 MG/DL (ref 8–23)
BUN/CREAT SERPL: 36.5 (ref 7–25)
CALCIUM SPEC-SCNC: 9.6 MG/DL (ref 8.2–9.6)
CHLORIDE SERPL-SCNC: 96 MMOL/L (ref 98–107)
CO2 SERPL-SCNC: 27 MMOL/L (ref 22–29)
CREAT SERPL-MCNC: 2.77 MG/DL (ref 0.57–1)
DEPRECATED RDW RBC AUTO: 45.3 FL (ref 37–54)
EGFRCR SERPLBLD CKD-EPI 2021: 15.7 ML/MIN/1.73
EOSINOPHIL # BLD AUTO: 0.01 10*3/MM3 (ref 0–0.4)
EOSINOPHIL NFR BLD AUTO: 0.2 % (ref 0.3–6.2)
ERYTHROCYTE [DISTWIDTH] IN BLOOD BY AUTOMATED COUNT: 13.8 % (ref 12.3–15.4)
GLUCOSE BLDC GLUCOMTR-MCNC: 184 MG/DL (ref 70–130)
GLUCOSE BLDC GLUCOMTR-MCNC: 213 MG/DL (ref 70–130)
GLUCOSE BLDC GLUCOMTR-MCNC: 300 MG/DL (ref 70–130)
GLUCOSE SERPL-MCNC: 225 MG/DL (ref 65–99)
HCT VFR BLD AUTO: 36.2 % (ref 34–46.6)
HGB BLD-MCNC: 11.7 G/DL (ref 12–15.9)
IMM GRANULOCYTES # BLD AUTO: 0.02 10*3/MM3 (ref 0–0.05)
IMM GRANULOCYTES NFR BLD AUTO: 0.4 % (ref 0–0.5)
LYMPHOCYTES # BLD AUTO: 1.38 10*3/MM3 (ref 0.7–3.1)
LYMPHOCYTES NFR BLD AUTO: 24.4 % (ref 19.6–45.3)
MCH RBC QN AUTO: 29.1 PG (ref 26.6–33)
MCHC RBC AUTO-ENTMCNC: 32.3 G/DL (ref 31.5–35.7)
MCV RBC AUTO: 90 FL (ref 79–97)
MONOCYTES # BLD AUTO: 0.44 10*3/MM3 (ref 0.1–0.9)
MONOCYTES NFR BLD AUTO: 7.8 % (ref 5–12)
NEUTROPHILS NFR BLD AUTO: 3.79 10*3/MM3 (ref 1.7–7)
NEUTROPHILS NFR BLD AUTO: 67 % (ref 42.7–76)
NRBC BLD AUTO-RTO: 0 /100 WBC (ref 0–0.2)
PLATELET # BLD AUTO: 105 10*3/MM3 (ref 140–450)
PLATELET # BLD AUTO: 105 10*3/MM3 (ref 140–450)
PLATELETS.RETICULATED NFR BLD AUTO: 10.3 % (ref 0.9–6.5)
PMV BLD AUTO: 12.7 FL (ref 6–12)
POTASSIUM SERPL-SCNC: 4.1 MMOL/L (ref 3.5–5.2)
RBC # BLD AUTO: 4.02 10*6/MM3 (ref 3.77–5.28)
SARS-COV-2 RNA PNL SPEC NAA+PROBE: NOT DETECTED
SODIUM SERPL-SCNC: 137 MMOL/L (ref 136–145)
WBC NRBC COR # BLD: 5.65 10*3/MM3 (ref 3.4–10.8)

## 2022-03-08 PROCEDURE — 63710000001 INSULIN LISPRO (HUMAN) PER 5 UNITS: Performed by: HOSPITALIST

## 2022-03-08 PROCEDURE — 87635 SARS-COV-2 COVID-19 AMP PRB: CPT | Performed by: HOSPITALIST

## 2022-03-08 PROCEDURE — 99232 SBSQ HOSP IP/OBS MODERATE 35: CPT | Performed by: INTERNAL MEDICINE

## 2022-03-08 PROCEDURE — 85055 RETICULATED PLATELET ASSAY: CPT | Performed by: INTERNAL MEDICINE

## 2022-03-08 PROCEDURE — 82962 GLUCOSE BLOOD TEST: CPT

## 2022-03-08 PROCEDURE — 97110 THERAPEUTIC EXERCISES: CPT | Performed by: OCCUPATIONAL THERAPIST

## 2022-03-08 PROCEDURE — 74176 CT ABD & PELVIS W/O CONTRAST: CPT

## 2022-03-08 PROCEDURE — 80048 BASIC METABOLIC PNL TOTAL CA: CPT | Performed by: HOSPITALIST

## 2022-03-08 PROCEDURE — 63710000001 PREDNISONE PER 1 MG: Performed by: HOSPITALIST

## 2022-03-08 PROCEDURE — 85025 COMPLETE CBC W/AUTO DIFF WBC: CPT | Performed by: INTERNAL MEDICINE

## 2022-03-08 PROCEDURE — 99231 SBSQ HOSP IP/OBS SF/LOW 25: CPT | Performed by: NURSE PRACTITIONER

## 2022-03-08 RX ORDER — FAMOTIDINE 20 MG/1
10 TABLET, FILM COATED ORAL 2 TIMES DAILY
Status: DISCONTINUED | OUTPATIENT
Start: 2022-03-08 | End: 2022-03-09 | Stop reason: HOSPADM

## 2022-03-08 RX ORDER — PREDNISONE 20 MG/1
20 TABLET ORAL
Qty: 3 TABLET | Refills: 0 | Status: SHIPPED | OUTPATIENT
Start: 2022-03-09 | End: 2022-03-08 | Stop reason: HOSPADM

## 2022-03-08 RX ORDER — GABAPENTIN 100 MG/1
200 CAPSULE ORAL NIGHTLY
Qty: 6 CAPSULE | Refills: 0 | Status: SHIPPED | OUTPATIENT
Start: 2022-03-08 | End: 2022-04-04

## 2022-03-08 RX ORDER — ROSUVASTATIN CALCIUM 5 MG/1
5 TABLET, COATED ORAL NIGHTLY
Qty: 30 TABLET | Refills: 0 | Status: SHIPPED | OUTPATIENT
Start: 2022-03-08 | End: 2022-05-18 | Stop reason: ALTCHOICE

## 2022-03-08 RX ORDER — PREDNISONE 1 MG/1
15 TABLET ORAL
Qty: 90 TABLET | Refills: 0 | Status: SHIPPED | OUTPATIENT
Start: 2022-03-09 | End: 2022-03-23

## 2022-03-08 RX ORDER — ISOSORBIDE MONONITRATE 30 MG/1
30 TABLET, EXTENDED RELEASE ORAL DAILY
Qty: 30 TABLET | Refills: 0 | Status: ON HOLD | OUTPATIENT
Start: 2022-03-09 | End: 2022-06-06

## 2022-03-08 RX ORDER — LORAZEPAM 0.5 MG/1
0.5 TABLET ORAL AS NEEDED
Qty: 10 TABLET | Refills: 0 | Status: SHIPPED | OUTPATIENT
Start: 2022-03-08 | End: 2022-03-11

## 2022-03-08 RX ORDER — INSULIN LISPRO 100 [IU]/ML
7 INJECTION, SOLUTION INTRAVENOUS; SUBCUTANEOUS
Status: DISCONTINUED | OUTPATIENT
Start: 2022-03-08 | End: 2022-03-09 | Stop reason: HOSPADM

## 2022-03-08 RX ORDER — FUROSEMIDE 40 MG/1
40 TABLET ORAL DAILY
Status: DISCONTINUED | OUTPATIENT
Start: 2022-03-09 | End: 2022-03-09 | Stop reason: HOSPADM

## 2022-03-08 RX ORDER — PREDNISONE 10 MG/1
15 TABLET ORAL
Status: DISCONTINUED | OUTPATIENT
Start: 2022-03-09 | End: 2022-03-09 | Stop reason: HOSPADM

## 2022-03-08 RX ORDER — FUROSEMIDE 40 MG/1
40 TABLET ORAL 2 TIMES DAILY
Qty: 60 TABLET | Refills: 0 | Status: SHIPPED | OUTPATIENT
Start: 2022-03-08 | End: 2022-03-09 | Stop reason: HOSPADM

## 2022-03-08 RX ORDER — SPIRONOLACTONE 25 MG/1
12.5 TABLET ORAL DAILY
Qty: 15 TABLET | Refills: 0 | Status: ON HOLD | OUTPATIENT
Start: 2022-03-09 | End: 2022-06-06

## 2022-03-08 RX ORDER — HYDRALAZINE HYDROCHLORIDE 25 MG/1
25 TABLET, FILM COATED ORAL EVERY 8 HOURS SCHEDULED
Qty: 90 TABLET | Refills: 0 | Status: ON HOLD | OUTPATIENT
Start: 2022-03-08 | End: 2022-06-06

## 2022-03-08 RX ORDER — FAMOTIDINE 10 MG
10 TABLET ORAL 2 TIMES DAILY
Qty: 60 TABLET | Refills: 0 | Status: SHIPPED | OUTPATIENT
Start: 2022-03-08 | End: 2022-04-07

## 2022-03-08 RX ADMIN — AMLODIPINE BESYLATE 10 MG: 10 TABLET ORAL at 08:25

## 2022-03-08 RX ADMIN — LEVOTHYROXINE SODIUM 25 MCG: 0.03 TABLET ORAL at 08:25

## 2022-03-08 RX ADMIN — FAMOTIDINE 20 MG: 20 TABLET ORAL at 08:26

## 2022-03-08 RX ADMIN — FUROSEMIDE 40 MG: 40 TABLET ORAL at 08:25

## 2022-03-08 RX ADMIN — ACETAMINOPHEN 1000 MG: 500 TABLET, FILM COATED ORAL at 20:55

## 2022-03-08 RX ADMIN — MONTELUKAST SODIUM 10 MG: 10 TABLET, FILM COATED ORAL at 08:25

## 2022-03-08 RX ADMIN — METOPROLOL TARTRATE 12.5 MG: 25 TABLET, FILM COATED ORAL at 23:27

## 2022-03-08 RX ADMIN — PREDNISONE 20 MG: 20 TABLET ORAL at 08:25

## 2022-03-08 RX ADMIN — INSULIN LISPRO 5 UNITS: 100 INJECTION, SOLUTION INTRAVENOUS; SUBCUTANEOUS at 08:26

## 2022-03-08 RX ADMIN — LORAZEPAM 0.5 MG: 0.5 TABLET ORAL at 20:56

## 2022-03-08 RX ADMIN — FUROSEMIDE 40 MG: 40 TABLET ORAL at 17:06

## 2022-03-08 RX ADMIN — HYDRALAZINE HYDROCHLORIDE 25 MG: 25 TABLET, FILM COATED ORAL at 05:45

## 2022-03-08 RX ADMIN — GABAPENTIN 200 MG: 100 CAPSULE ORAL at 20:48

## 2022-03-08 RX ADMIN — HYDRALAZINE HYDROCHLORIDE 25 MG: 25 TABLET, FILM COATED ORAL at 20:50

## 2022-03-08 RX ADMIN — INSULIN LISPRO 2 UNITS: 100 INJECTION, SOLUTION INTRAVENOUS; SUBCUTANEOUS at 08:26

## 2022-03-08 RX ADMIN — FAMOTIDINE 10 MG: 20 TABLET ORAL at 20:49

## 2022-03-08 RX ADMIN — INSULIN LISPRO 3 UNITS: 100 INJECTION, SOLUTION INTRAVENOUS; SUBCUTANEOUS at 12:19

## 2022-03-08 RX ADMIN — METOPROLOL TARTRATE 12.5 MG: 25 TABLET, FILM COATED ORAL at 12:17

## 2022-03-08 RX ADMIN — INSULIN LISPRO 7 UNITS: 100 INJECTION, SOLUTION INTRAVENOUS; SUBCUTANEOUS at 17:07

## 2022-03-08 RX ADMIN — HYDRALAZINE HYDROCHLORIDE 25 MG: 25 TABLET, FILM COATED ORAL at 13:31

## 2022-03-08 RX ADMIN — INSULIN GLARGINE-YFGN 20 UNITS: 100 INJECTION, SOLUTION SUBCUTANEOUS at 21:42

## 2022-03-08 RX ADMIN — ROSUVASTATIN CALCIUM 5 MG: 20 TABLET, FILM COATED ORAL at 20:50

## 2022-03-08 RX ADMIN — ISOSORBIDE MONONITRATE 30 MG: 30 TABLET ORAL at 08:25

## 2022-03-08 RX ADMIN — TAMSULOSIN HYDROCHLORIDE 0.4 MG: 0.4 CAPSULE ORAL at 20:50

## 2022-03-08 RX ADMIN — INSULIN LISPRO 5 UNITS: 100 INJECTION, SOLUTION INTRAVENOUS; SUBCUTANEOUS at 12:19

## 2022-03-08 RX ADMIN — INSULIN LISPRO 3 UNITS: 100 INJECTION, SOLUTION INTRAVENOUS; SUBCUTANEOUS at 17:06

## 2022-03-08 RX ADMIN — SPIRONOLACTONE 12.5 MG: 25 TABLET ORAL at 08:26

## 2022-03-08 NOTE — PLAN OF CARE
Goal Outcome Evaluation:               Pt resting in bed quietly. Given tylenol for generalized aches before bed. Turns self. F/c to bsd.

## 2022-03-08 NOTE — THERAPY TREATMENT NOTE
Patient Name: Helena Carmen  : 1931    MRN: [id number]                              Today's Date: 3/8/2022       Admit Date: 3/2/2022    Visit Dx:     ICD-10-CM ICD-9-CM   1. Acute on chronic combined systolic and diastolic congestive heart failure (HCC)  I50.43 428.43     428.0   2. Chronic kidney disease, unspecified CKD stage  N18.9 585.9     Patient Active Problem List   Diagnosis   • Aortic valve stenosis   • Diastolic dysfunction   • Fatigue   • MI (mitral incompetence)   • PVC (premature ventricular contraction)   • Legally blind   • Essential hypertension   • Hypertriglyceridemia   • Pulmonary hypertension (HCC)   • Paroxysmal atrial fibrillation (HCC)   • Breast screening   • Abdominal pain, right lower quadrant   • Allergic rhinitis   • Anxiety   • Chronic kidney disease   • Chronic pain disorder   • Degeneration of lumbar intervertebral disc   • Diabetes mellitus (HCC)   • Esophageal reflux   • Gastroparesis   • Hiatal hernia   • Iatrogenic hypothyroidism   • Long term (current) use of opiate analgesic   • Macular degeneration   • Malignant neoplasm of uterus (HCC)   • Osteoarthritis of knee   • Peripheral nerve disease   • Secondary hyperparathyroidism (HCC)   • Thrombocytopenia (HCC)   • Mitral valve regurgitation   • History of aortic valve replacement   • Nonrheumatic tricuspid valve regurgitation   • Elevated serum creatinine   • Chronic diastolic congestive heart failure (HCC)   • CHF (congestive heart failure) (HCC)   • Other cirrhosis of liver (HCC)     Past Medical History:   Diagnosis Date   • Aortic valve stenosis     s/p tissue AVR   • Back pain    • Colitis due to Clostridioides difficile 2022   • Diastolic dysfunction     Grade 2 per echocardiogram    • Diverticulosis    • Exertional shortness of breath    • Hiatal hernia    • Hyperlipidemia    • Hypertension    • Hyperthyroidism    • Hypertriglyceridemia 2018   • Hypothyroidism    • Left ventricular hypertrophy    •  Legally blind    • Macular degeneration    • Mitral regurgitation    • Osteoarthritis of hip    • Pancreatitis 1/26/2022   • Paroxysmal atrial fibrillation (HCC)    • Premature ventricular contractions    • Pulmonary hypertension (HCC)    • Renal insufficiency syndrome    • Type 2 diabetes mellitus (HCC)      Past Surgical History:   Procedure Laterality Date   • AORTIC VALVE REPAIR/REPLACEMENT     • CATARACT EXTRACTION      1970, 1999   • ENDOSCOPY  08/15/2014    no gross lesions in stomach/duodenum, erythrematous mucosa in stomach   • HYSTERECTOMY  2007   • STERNOTOMY        General Information     Row Name 03/08/22 1541          OT Time and Intention    Document Type therapy note (daily note)  -     Mode of Treatment individual therapy;occupational therapy  -     Row Name 03/08/22 1541          General Information    Existing Precautions/Restrictions fall  -     Row Name 03/08/22 1541          Cognition    Orientation Status (Cognition) oriented x 4  -     Row Name 03/08/22 1541          Safety Issues, Functional Mobility    Impairments Affecting Function (Mobility) balance;strength;endurance/activity tolerance  -           User Key  (r) = Recorded By, (t) = Taken By, (c) = Cosigned By    Initials Name Provider Type    Annalise Del Angel OTR Occupational Therapist                 Mobility/ADL's     Row Name 03/08/22 1542          Bed Mobility    Comment, (Bed Mobility) NT UI  -     Row Name 03/08/22 1542          Transfers    Comment, (Transfers) pt just tsf into chair about an hour ago. encouraged pt to stay up in chair  -     Row Name 03/08/22 1542          Grooming Assessment/Training    [place] Level (Grooming) grooming skills;oral care regimen;set up;standby assist  -     Position (Grooming) supported sitting  -           User Key  (r) = Recorded By, (t) = Taken By, (c) = Cosigned By    Initials Name Provider Type    Annalise Del Angel OTR Occupational Therapist                Obj/Interventions     [facility] Name 03/08/22 1542          Shoulder (Therapeutic Exercise)    Shoulder (Therapeutic Exercise) AROM (active range of motion)  -     Shoulder AROM (Therapeutic Exercise) bilateral;flexion;extension;10 repetitions;2 sets;sitting;scapular retraction;scapular protraction  -[facility] Name 03/08/22 1542          Elbow/Forearm (Therapeutic Exercise)    Elbow/Forearm (Therapeutic Exercise) AROM (active range of motion)  -     Elbow/Forearm AROM (Therapeutic Exercise) bilateral;flexion;extension;supination;pronation;10 repetitions;sitting;2 sets  -[facility] Name 03/08/22 1542          Motor Skills    Therapeutic Exercise elbow/forearm;shoulder  -[facility] Name 03/08/22 1542          Balance    Balance Assessment sitting static balance  -     Static Sitting Balance supervision  -     Position, Sitting Balance supported;sitting in chair  -     Balance Interventions sitting;supported;static  -           User Key  (r) = Recorded By, (t) = Taken By, (c) = Cosigned By    Initials Name Provider Type     Annalise Barlow, OTR Occupational Therapist               Goals/Plan    No documentation.                Clinical Impression     [facility] Name 03/08/22 1543          Pain Assessment    Pretreatment Pain Rating 0/10 - no pain  -     Posttreatment Pain Rating 0/10 - no pain  -[facility] Name 03/08/22 1543          Plan of Care Review    Plan of Care Reviewed With patient  -     Progress improving  -     Outcome Evaluation pt completed UE AROM 10x2 while sitting in the chair to incr strength and endurance. pt completed brushing her teeth w set up. pt seen today as her DC was cancelled due to transport not able to get her, but per notes EMS will transport her tomorrow am. pt will cont therapy at rehab. pt progressing well.  -     Row Name 03/08/22 1543          Positioning and Restraints    Pre-Treatment Position sitting in chair/recliner  -     Post Treatment Position chair   -KP     In Chair reclined;call light within reach;encouraged to call for assist;exit alarm on  -KP           User Key  (r) = Recorded By, (t) = Taken By, (c) = Cosigned By    Initials Name Provider Type    Annalise Del Angel OTR Occupational Therapist               Outcome Measures     Row Name 03/08/22 1544          How much help from another is currently needed...    Putting on and taking off regular lower body clothing? 2  -KP     Bathing (including washing, rinsing, and drying) 2  -KP     Toileting (which includes using toilet bed pan or urinal) 2  -KP     Putting on and taking off regular upper body clothing 3  -KP     Taking care of personal grooming (such as brushing teeth) 3  -KP     Eating meals 3  -KP     AM-PAC 6 Clicks Score (OT) 15  -KP     Row Name 03/08/22 0829          How much help from another person do you currently need...    Turning from your back to your side while in flat bed without using bedrails? 3  -PT     Moving from lying on back to sitting on the side of a flat bed without bedrails? 3  -PT     Moving to and from a bed to a chair (including a wheelchair)? 3  -PT     Standing up from a chair using your arms (e.g., wheelchair, bedside chair)? 2  -PT     Climbing 3-5 steps with a railing? 2  -PT     To walk in hospital room? 2  -PT     AM-PAC 6 Clicks Score (PT) 15  -PT           User Key  (r) = Recorded By, (t) = Taken By, (c) = Cosigned By    Initials Name Provider Type    Annalise Del Angel OTR Occupational Therapist    PT Vesta Hairston RN Registered Nurse                Occupational Therapy Education                 Title: PT OT SLP Therapies (In Progress)     Topic: Occupational Therapy (In Progress)     Point: ADL training (In Progress)     Description:   Instruct learner(s) on proper safety adaptation and remediation techniques during self care or transfers.   Instruct in proper use of assistive devices.              Learning Progress Summary           Patient  Acceptance, E, NR by  at 3/7/2022 1705    Acceptance, E,TB,D, VU,DU by  at 3/4/2022 1535    Comment: ed pt on role of OT. benefit of therapy, POC w. OT. ed on safety w. ADL   Family Acceptance, E,TB,D, VU,DU by  at 3/4/2022 1535    Comment: ed pt on role of OT. benefit of therapy, POC w. OT. ed on safety w. ADL                   Point: Precautions (In Progress)     Description:   Instruct learner(s) on prescribed precautions during self-care and functional transfers.              Learning Progress Summary           Patient Acceptance, E, NR by  at 3/7/2022 1705    Acceptance, E,TB,D, VU,DU by  at 3/4/2022 1535    Comment: ed pt on role of OT. benefit of therapy, POC w. OT. ed on safety w. ADL   Family Acceptance, E,TB,D, VU,DU by  at 3/4/2022 1535    Comment: ed pt on role of OT. benefit of therapy, POC w. OT. ed on safety w. ADL                   Point: Body mechanics (In Progress)     Description:   Instruct learner(s) on proper positioning and spine alignment during self-care, functional mobility activities and/or exercises.              Learning Progress Summary           Patient Acceptance, E, NR by  at 3/7/2022 1705    Acceptance, E,TB,D, VU,DU by  at 3/4/2022 1535    Comment: ed pt on role of OT. benefit of therapy, POC w. OT. ed on safety w. ADL   Family Acceptance, E,TB,D, VU,DU by  at 3/4/2022 1535    Comment: ed pt on role of OT. benefit of therapy, POC w. OT. ed on safety w. ADL                               User Key     Initials Effective Dates Name Provider Type Discipline     06/16/21 -  Annalise Barlow OTR Occupational Therapist OT     06/16/21 -  Nini Mckeon, PT Physical Therapist PT              OT Recommendation and Plan  Planned Therapy Interventions (OT): activity tolerance training, functional balance retraining, occupation/activity based interventions, BADL retraining, passive ROM/stretching, transfer/mobility retraining, patient/caregiver education/training,  strengthening exercise  Therapy Frequency (OT): 5 times/wk  Plan of Care Review  Plan of Care Reviewed With: patient  Progress: improving  Outcome Evaluation: pt completed UE AROM 10x2 while sitting in the chair to incr strength and endurance. pt completed brushing her teeth w set up. pt seen today as her DC was cancelled due to transport not able to get her, but per notes EMS will transport her tomorrow am. pt will cont therapy at rehab. pt progressing well.     Time Calculation:    Time Calculation- OT     Row Name 03/08/22 1545             Time Calculation- OT    OT Start Time 1434  -KP      OT Stop Time 1449  -KP      OT Time Calculation (min) 15 min  -KP      Total Timed Code Minutes- OT 15 minute(s)  -KP      OT Received On 03/08/22  -      OT - Next Appointment 03/10/22  -              Timed Charges    52990 - OT Therapeutic Exercise Minutes 15  -KP              Total Minutes    Timed Charges Total Minutes 15  -KP       Total Minutes 15  -KP            User Key  (r) = Recorded By, (t) = Taken By, (c) = Cosigned By    Initials Name Provider Type    Annalise Del Angel OTR Occupational Therapist              Therapy Charges for Today     Code Description Service Date Service Provider Modifiers Qty    84593344285 HC OT THER PROC EA 15 MIN 3/8/2022 Annalise Barlow OTR GO 1               PEREZ Wilcox  3/8/2022

## 2022-03-08 NOTE — PROGRESS NOTES
"    Patient Name: Helena Carmen  :1931  89 y.o.      Patient Care Team:  Mariah Hickman MD as PCP - General (Family Medicine)  Beth Butcher MD as Consulting Physician (Cardiology)    Chief Complaint: follow up shortness of breath, diastolic congestive heart failure    Interval History: renal function unchanged. She is on room air and lying flat without orthopnea. She feels \"tired\". Just had a bath. Good UOP. Urine clear yellow.        Objective   Vital Signs  Temp:  [97.3 °F (36.3 °C)-98 °F (36.7 °C)] 98 °F (36.7 °C)  Heart Rate:  [52-62] 56  Resp:  [14-18] 18  BP: (145-164)/(58-67) 152/58    Intake/Output Summary (Last 24 hours) at 3/8/2022 1030  Last data filed at 3/8/2022 0829  Gross per 24 hour   Intake 330 ml   Output 2100 ml   Net -1770 ml     Flowsheet Rows      First Filed Value   Admission Height 144.8 cm (57\") Documented at 2022 1624   Admission Weight 81.2 kg (179 lb) Documented at 2022 1624          Physical Exam:   General Appearance:    Alert, cooperative, in no acute distress   Lungs:     Clear to auscultation.  Normal respiratory effort and rate.      Heart:    Regular rhythm and normal rate, normal S1 and S2, no murmurs, gallops or rubs.     Chest Wall:    No abnormalities observed   Abdomen:     Soft, nontender, positive bowel sounds.     Extremities:   no cyanosis, clubbing or edema.  No marked joint deformities.  Adequate musculoskeletal strength.       Results Review:    Results from last 7 days   Lab Units 22  0442   SODIUM mmol/L 137   POTASSIUM mmol/L 4.1   CHLORIDE mmol/L 96*   CO2 mmol/L 27.0   BUN mg/dL 101*   CREATININE mg/dL 2.77*   GLUCOSE mg/dL 225*   CALCIUM mg/dL 9.6     Results from last 7 days   Lab Units 22  0645 22  1632   TROPONIN T ng/mL 0.034* 0.024     Results from last 7 days   Lab Units 22  0442   WBC 10*3/mm3 5.65   HEMOGLOBIN g/dL 11.7*   HEMATOCRIT % 36.2   PLATELETS 10*3/mm3 105*  105*         Results from " last 7 days   Lab Units 03/07/22  0810   MAGNESIUM mg/dL 2.2     Results from last 7 days   Lab Units 03/04/22  0645   CHOLESTEROL mg/dL 114   TRIGLYCERIDES mg/dL 148   HDL CHOL mg/dL 39*   LDL CHOL mg/dL 50               Medication Review:   amLODIPine, 10 mg, Oral, Daily  famotidine, 20 mg, Oral, BID  furosemide, 40 mg, Oral, BID  gabapentin, 200 mg, Oral, Nightly  hydrALAZINE, 25 mg, Oral, Q8H  insulin glargine, 15 Units, Subcutaneous, Nightly  insulin lispro, 0-7 Units, Subcutaneous, TID AC  insulin lispro, 5 Units, Subcutaneous, TID With Meals  isosorbide mononitrate, 30 mg, Oral, Daily  levothyroxine, 25 mcg, Oral, Daily  metoprolol tartrate, 12.5 mg, Oral, Q12H  montelukast, 10 mg, Oral, Daily  predniSONE, 20 mg, Oral, Daily With Breakfast  rosuvastatin, 5 mg, Oral, Nightly  spironolactone, 12.5 mg, Oral, Daily  tamsulosin, 0.4 mg, Oral, Nightly              Assessment/Plan   1. Acute on chronic diastolic heart failure - oral lasix.   2. Hypertension - better. Reasonable control for age.   3. Acute on chronic kidney disease - cardiorenal. Nephrology is following. Creatinine holding ~2.7 now on diuretics.   4.  Pulmonary hypertension - RVSP on recent echo only 38mmHg. I would continue to treat heart failure and see how she does. Do not feel right heart cath warranted at this time.   5. Aortic valve replacement - peak 34.7 mmHg, mean 16.4 mmHg, JUDD 1.6cm2, DI 0.6. appears stable.  6. Diabetes mellitus type II  7. Thrombocytopenia     She seems to be doing better. No changes from a cardiac standpoint. Will sign off. Please call with additional questions.    See Cards NP in 1-2 weeks.    RIGO Poole  [facility]  03/08/22  10:30 EST

## 2022-03-08 NOTE — PLAN OF CARE
Goal Outcome Evaluation:           Progress: improving  Outcome Evaluation: Pt AOx4. On RA. Up to chair after lunch. F/c to BSD. D/c orders in place but EMS will trasnport in AM. CT of abd/pelvis completed today. No c/o pain or discomfort. Will continue to monitor.

## 2022-03-08 NOTE — CASE MANAGEMENT/SOCIAL WORK
Continued Stay Note  [facility]     Patient Name: Helena Carmen  MRN: [id number]  Today's Date: 3/8/2022    Admit Date: 3/2/2022     Discharge Plan     Row Name 03/08/22 1552       Plan    Plan [facility] tomorrow via [facility] scheduled for 3/9 at 9:00AM    Patient/Family in Agreement with Plan yes    Plan Comments CCP spoke to patient and daughter at bedside regarding an update on the d/c plan. CCP let patient and daughter know the next available EMS transportation is tomorrow 3/9 at 9:00AM to [place]. Patient’s daughter is agreeable to transport date and time. CCP spoke to [place] and confirmed the transport date and time with her. Plan remains for patient to d/c 3/9 at 9:00AM via [facility] to [facility]. TOSHIA Fontanez               Discharge Codes    No documentation.               Expected Discharge Date and Time     Expected Discharge Date Expected Discharge Time    Mar 8, 2022             TOSHIA DELUNA

## 2022-03-08 NOTE — PROGRESS NOTES
[facility] INPATIENT PROGRESS NOTE    Length of Stay:  6 days    CHIEF COMPLAINT: Thrombocytopenia      SUBJECTIVE: Patient has no specific complaints today.  She is sitting up in a chair, reports that she feels much better currently.  Plans are to discharge to subacute rehab tomorrow morning.      ROS:  Review of Systems   A comprehensive review of systems was obtained with pertinent positive findings as noted in the interval history above.  All other systems negative.    OBJECTIVE:  Vitals:    03/07/22 2350 03/08/22 0543 03/08/22 0545 03/08/22 0735   BP:   150/67 152/58   BP Location:    Left arm   Patient Position:    Lying   Pulse: 54  52 56   Resp:    18   Temp:    98 °F (36.7 °C)   TempSrc:    Oral   SpO2:    99%   Weight:  69.4 kg (153 lb)     Height:             PHYSICAL EXAMINATION:  General: Elderly female seated in a chair no distress  Chest/Lungs: Clear to auscultation bilaterally anteriorly  Heart: Regular rate and rhythm  Abdomen/GI: Soft nontender nondistended bowel sounds present  Extremities: No edema    DIAGNOSTIC DATA:  Results Review:     I reviewed the patient's new clinical results.    Results from last 7 days   Lab Units 03/08/22  0442 03/07/22  0810 03/06/22  0654   WBC 10*3/mm3 5.65 6.56 6.75   HEMOGLOBIN g/dL 11.7* 12.5 11.8*   HEMATOCRIT % 36.2 37.7 36.7   PLATELETS 10*3/mm3 105*  105* 108*  108* 90*      Results from last 7 days   Lab Units 03/08/22  0442 03/07/22  0810 03/06/22  0654 03/05/22  0544 03/04/22  0645 03/03/22  1158 03/02/22  1632   SODIUM mmol/L 137 138 140   < > 139   < > 131*   POTASSIUM mmol/L 4.1 4.0 3.6   < > 4.1   < > 4.8   CHLORIDE mmol/L 96* 97* 95*   < > 96*   < > 94*   CO2 mmol/L 27.0 26.5 28.0   < > 29.0   < > 21.1*   BUN mg/dL 101* 86* 89*   < > 62*   < > 63*   CREATININE mg/dL 2.77* 2.74* 2.92*   < > 2.70*   < > 2.79*   CALCIUM mg/dL 9.6 10.3* 9.7*   < > 9.9*   < > 9.2   BILIRUBIN mg/dL  --   --   --   --  0.7  --  0.3   ALK PHOS U/L  --   --    --   --  32*  --  31*   ALT (SGPT) U/L  --   --   --   --  16  --  20   AST (SGOT) U/L  --   --   --   --  21  --  27   GLUCOSE mg/dL 225* 157* 199*   < > 159*   < > 172*    < > = values in this interval not displayed.      Lab Results   Component Value Date    NEUTROABS 3.79 03/08/2022         Results from last 7 days   Lab Units 03/07/22  0810   MAGNESIUM mg/dL 2.2           Assessment/Plan   ASSESSMENT/PLAN:  This is a 89 y.o. female with:     *Thrombocytopenia  · Patient with apparent longstanding history of thrombocytopenia  · Previous CT scan with suggestion of chronic liver disease/cirrhotic liver morphology, last CT 4/15/2019 with normal spleen  · Platelet count 3/22/2019 was 52,000 and on 1/26/2022 was 60,000  · On admission 3/2/2022, platelet count 39,000  · Additional labs 3/3/2022 with IPF elevated at 12.2% indicative of peripheral destructive process and has remained elevated in the 10-12% range.  · Additional labs 3/3/2022 with positive BECCA at 1: 320 dilution with homogeneous pattern, negative BECCA panel  · On 3/4/2022, B12 low normal at 238, folate greater than 20, negative serum protein electrophoresis and immunoelectrophoresis with free kappa light chain 65.6, free lambda light chain 37.1 and free light chain ratio 1.77 (appropriate for degree of renal dysfunction), LDH borderline elevated 238  · CT abdomen and pelvis 3/8/2022 with liver morphology suspicious for chronic liver disease, spleen normal in size at 10.4 cm.  No other abnormalities.  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    · Unclear whether thrombocytopenia is related to steroids, B12 replacement, or improvement in CHF/volume overload.  · Platelet count today stable at 105,000 with IPF that remains elevated at 10.3% indicating peripheral destructive process.  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  I will taper her prednisone from 20 down to 15 mg daily prior to discharge to subacute nursing facility tomorrow.  We will plan to check CBC weekly at the nursing facility with potential further prednisone taper by 5 mg/week if her count remains stable.  We will schedule the patient back for follow-up visit in 3 weeks with CBC, CMP, IPF.    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 twice daily currently    *Diabetes mellitus type 2  · Exacerbated by steroids  · Currently receiving Admelog 5 units 3 times daily with meals and additional sliding scale insulin.    *Possible chronic liver disease  · Prior CTs with notation of cirrhotic liver morphology  · CT abdomen pelvis 4/15/2019 showed liver with a mildly shrunken appearance concerning for chronic liver disease.  Spleen however was normal in size.  · LFTs normal on 3/2/2022  · CT abdomen and pelvis 3/8/2022 with liver morphology suspicious for chronic liver disease, spleen normal in size at 10.4 cm.  No other abnormalities.  · Significance of the liver appearance on scans is unclear.    Plan:  1. Taper prednisone to 15 mg daily beginning 3/9/2022  2. Patient is being discharged to subacute nursing facility in a.m.  3. We will make arrangements with the nursing facility for CBC to be drawn in 1 and 2 weeks to be faxed to our office and we will communicate orders for further prednisone dose reduction by 5 mg/week if platelet count remains stable.  4. Schedule  MD visit in 3 weeks with CBC, CMP, IPF.  5. We will sign off, please call with further questions.    Discussed with patient and daughter at bedside.           Pablo Sherman MD

## 2022-03-08 NOTE — DISCHARGE SUMMARY
Discharge summary    Date of admission 3/2/2022  Date of discharge 3/9/2022    Final diagnosis  Acute on chronic diastolic CHF  Acute kidney injury resolved  Chronic kidney disease stage IV  Paroxysmal atrial fibrillation  Aortic stenosis s/p AVR  Diabetes mellitus  Hypertension  Hyperlipidemia  Thrombocytopenia  Pulmonary hypertension  Gastroesophageal reflux disease    Discharge medications    Current Facility-Administered Medications:   •  acetaminophen (TYLENOL) tablet 1,000 mg, 1,000 mg, Oral, Q6H PRN, Mango Arnold MD, 1,000 mg at 03/07/22 2127  •  amLODIPine (NORVASC) tablet 10 mg, 10 mg, Oral, Daily, Mango Arnold MD, 10 mg at 03/08/22 0825  •  famotidine (PEPCID) tablet 10 mg, 10 mg, Oral, BID, Mango Arnold MD  •  furosemide (LASIX) tablet 40 mg, 40 mg, Oral, BID, Beth Butcher MD, 40 mg at 03/08/22 0825  •  gabapentin (NEURONTIN) capsule 200 mg, 200 mg, Oral, Nightly, Jordan Vázquez MD, 200 mg at 03/07/22 2126  •  hydrALAZINE (APRESOLINE) tablet 25 mg, 25 mg, Oral, Q8H, Jordan Vázquez MD, 25 mg at 03/08/22 1331  •  insulin glargine (LANTUS, SEMGLEE) injection 20 Units, 20 Units, Subcutaneous, Nightly, Mango Arnold MD  •  insulin lispro (ADMELOG) injection 0-7 Units, 0-7 Units, Subcutaneous, TID AC, Mango Arnold MD, 3 Units at 03/08/22 1219  •  insulin lispro (ADMELOG) injection 7 Units, 7 Units, Subcutaneous, TID With Meals, Mango Arnold MD  •  isosorbide mononitrate (IMDUR) 24 hr tablet 30 mg, 30 mg, Oral, Daily, Mango Arnold MD, 30 mg at 03/08/22 0825  •  levothyroxine (SYNTHROID, LEVOTHROID) tablet 25 mcg, 25 mcg, Oral, Daily, Mango Arnold MD, 25 mcg at 03/08/22 0825  •  LORazepam (ATIVAN) tablet 0.5 mg, 0.5 mg, Oral, Q6H PRN, Mango Arnold MD, 0.5 mg at 03/07/22 2127  •  metoprolol tartrate (LOPRESSOR) tablet 12.5 mg, 12.5 mg, Oral, Q12H, Mango Arnold MD, 12.5 mg at 03/08/22 1217  •  montelukast (SINGULAIR) tablet 10 mg, 10 mg, Oral, Daily, Mango Arnold MD, 10 mg at  03/08/22 0825  •  predniSONE (DELTASONE) tablet 20 mg, 20 mg, Oral, Daily With Breakfast, Kadeem Rivas MD, 20 mg at 03/08/22 0825  •  rosuvastatin (CRESTOR) tablet 5 mg, 5 mg, Oral, Nightly, Kadeem Rivas MD, 5 mg at 03/07/22 2127  •  [COMPLETED] Insert peripheral IV, , , Once **AND** sodium chloride 0.9 % flush 10 mL, 10 mL, Intravenous, PRN, Tomás Garnett MD, 10 mL at 03/06/22 0816  •  spironolactone (ALDACTONE) tablet 12.5 mg, 12.5 mg, Oral, Daily, Beth Butcher MD, 12.5 mg at 03/08/22 0826  •  tamsulosin (FLOMAX) 24 hr capsule 0.4 mg, 0.4 mg, Oral, Nightly, Kadeem Rivas MD, 0.4 mg at 03/07/22 2127     Consults obtained  Cardiology  Pulmonary  Nephrology  Hematology  Nutrition  Spiritual care    Procedures  None    Hospital course  89-year-old white female with history of multiple medical problems including aortic stenosis s/p AVR diastolic CHF hypertension hyperlipidemia atrial fibrillation and chronic kidney disease stage IV admitted to emergency room with shortness of breath leg swelling and weight gain.  Patient work-up in ER revealed acute kidney injury and acute on chronic diastolic CHF admit for management.  Patient admitted and followed by cardiology pulmonary nephrology and hematology.  Patient remain on IV diuresis and change to p.o.  Patient symptoms improved and all consultant recommend no further work-up and discharge to subacute rehab for PT OT nursing care.  Patient feeling better and clear for discharge.    Discharge diet regular    Activity per PT OT    Medications as above    Further care per accepting physician at subacute rehab and follow-up with cardiology pulmonary nephrology and hematology per the instruction and take medication as directed    KADEEM RIVAS MD

## 2022-03-08 NOTE — PROGRESS NOTES
"Daily progress note    Chief complaint  Doing same  No new complaints  Denies any chest pain shortness of breath palpitation  Family at bedside    History of present illness  89-year-old white female with history of diastolic CHF hypertension aortic stenosis s/p AVR pulmonary hypertension atrial fibrillation and chronic kidney disease stage IV presented to [facility] emergency room with increased shortness of breath generalized weakness leg swelling and weight gain.  Patient also have abdominal pain but no nausea vomiting diarrhea.  Patient denies any chest pain fever chills cough.  Patient work-up in ER revealed acute kidney injury and decompression CHF admit for management.  Patient is full code.    REVIEW OF SYSTEMS  Unremarkable except generalized weakness     PHYSICAL EXAM   Blood pressure 141/55, pulse 57, temperature 98 °F (36.7 °C), temperature source Oral, resp. rate 18, height 144.8 cm (57.01\"), weight 69.4 kg (153 lb), SpO2 99 %.    GENERAL: Awake and alert, nontoxic-appearing  HENT: nares patent  EYES: no scleral icterus  CV: regular rhythm, regular rate  RESPIRATORY: Mild increased work of breathing with crackles in the bases  ABDOMEN: soft, nontender mildly distended bowel sounds positive  MUSCULOSKELETAL: no deformity, 1+ pedal edema in both legs into the thigh  NEURO: alert, moves all extremities, follows commands  SKIN: warm, dry     LAB RESULTS  Lab Results (last 24 hours)     Procedure Component Value Units Date/Time    POC Glucose Once [[id number]]  (Abnormal) Collected: 03/08/22 1156    Specimen: Blood Updated: 03/08/22 1230     Glucose 213 mg/dL      Comment: Meter: [id number] : [id number] Gerhard JOHNSON       POC Glucose Once [[id number]]  (Abnormal) Collected: 03/08/22 0607    Specimen: Blood Updated: 03/08/22 0609     Glucose 184 mg/dL      Comment: Meter: [id number] : [id number] Jeffery JOHNSON       Basic Metabolic Panel [[id number]]  (Abnormal) Collected: 03/08/22 " 0442    Specimen: Blood Updated: 03/08/22 0531     Glucose 225 mg/dL       mg/dL      Creatinine 2.77 mg/dL      Sodium 137 mmol/L      Potassium 4.1 mmol/L      Chloride 96 mmol/L      CO2 27.0 mmol/L      Calcium 9.6 mg/dL      BUN/Creatinine Ratio 36.5     Anion Gap 14.0 mmol/L      eGFR 15.7 mL/min/1.73      Comment: National Kidney Foundation and American Society of Nephrology (ASN) Task Force recommended calculation based on the Chronic Kidney Disease Epidemiology Collaboration (CKD-EPI) equation refit without adjustment for race.       Narrative:      GFR Normal >60  Chronic Kidney Disease <60  Kidney Failure <15      Immature Platelet Fraction [[id number]]  (Abnormal) Collected: 03/08/22 0442    Specimen: Blood Updated: 03/08/22 0524     IPF 10.30 %      Platelets 105 10*3/mm3     CBC & Differential [[id number]]  (Abnormal) Collected: 03/08/22 0442    Specimen: Blood Updated: 03/08/22 0524    Narrative:      The following orders were created for panel order CBC & Differential.  Procedure                               Abnormality         Status                     ---------                               -----------         ------                     CBC Auto Differential[[id number]]        Abnormal            Final result                 Please view results for these tests on the individual orders.    CBC Auto Differential [[id number]]  (Abnormal) Collected: 03/08/22 0442    Specimen: Blood Updated: 03/08/22 0524     WBC 5.65 10*3/mm3      RBC 4.02 10*6/mm3      Hemoglobin 11.7 g/dL      Hematocrit 36.2 %      MCV 90.0 fL      MCH 29.1 pg      MCHC 32.3 g/dL      RDW 13.8 %      RDW-SD 45.3 fl      MPV 12.7 fL      Platelets 105 10*3/mm3      Neutrophil % 67.0 %      Lymphocyte % 24.4 %      Monocyte % 7.8 %      Eosinophil % 0.2 %      Basophil % 0.2 %      Immature Grans % 0.4 %      Neutrophils, Absolute 3.79 10*3/mm3      Lymphocytes, Absolute 1.38 10*3/mm3      Monocytes, Absolute 0.44 10*3/mm3       Eosinophils, Absolute 0.01 10*3/mm3      Basophils, Absolute 0.01 10*3/mm3      Immature Grans, Absolute 0.02 10*3/mm3      nRBC 0.0 /100 WBC     POC Glucose Once [[id number]]  (Abnormal) Collected: 03/07/22 2103    Specimen: Blood Updated: 03/07/22 2105     Glucose 286 mg/dL      Comment: Meter: [id number] : [id number] Jeffery Sandra HILL NA       MARINE,PE and FLC, Serum [[id number]]  (Abnormal) Collected: 03/04/22 0645    Specimen: Blood Updated: 03/07/22 1709     IgG 991 mg/dL      IgA 153 mg/dL      IgM 63 mg/dL      Total Protein 6.7 g/dL      Albumin 3.2 g/dL      Alpha-1-Globulin 0.4 g/dL      Alpha-2-Globulin 1.0 g/dL      Beta Globulin 1.1 g/dL      Gamma Globulin 1.0 g/dL      M-Dm Not Observed g/dL      Globulin 3.5 g/dL      A/G Ratio 1.0     Immunofixation Reflex, Serum Comment     Comment: No monoclonality detected.        Please note Comment     Comment: Protein electrophoresis scan will follow via computer, mail, or   delivery.        Free Light Chain, Kappa 65.6 mg/L      Free Lambda Light Chains 37.1 mg/L      Kappa/Lambda Ratio 1.77    Narrative:      Performed at:  [address] [place], [place] [id number]  : Toño Flores PhD, Phone:  [phone]    POC Glucose Once [[id number]]  (Abnormal) Collected: 03/07/22 1635    Specimen: Blood Updated: 03/07/22 1637     Glucose 198 mg/dL      Comment: Meter: [id number] : [id number] [facility] CNA           Imaging Results (Last 24 Hours)     Procedure Component Value Units Date/Time    CT Abdomen Pelvis Without Contrast [[id number]] Collected: 03/08/22 0950     Updated: 03/08/22 0950    Narrative:      CT ABDOMEN AND PELVIS WITH CONTRAST     HISTORY: Assess for splenomegaly and cirrhosis. Generalized weakness.     TECHNIQUE: Axial CT images of the abdomen and pelvis were obtained  following administration of intravenous contrast. The patient was not  given oral contrast Coronal and sagittal reformats  were obtained.     COMPARISON: Images from a CT abdomen and pelvis performed on 09/07/2021  at an outside institution.     FINDINGS: Marked mitral valvular calcification is seen. No pericardial  effusion. Small layering right pleural effusion. Small sliding hiatal  hernia is present. The liver demonstrates normal attenuation. Mild  nodularity to the outline may suggest chronic liver disease/cirrhosis.  The spleen is normal in size measuring 10.4 cm in craniocaudal  dimension. Capsular calcification may represent sequela from prior  subcapsular collection/hematoma. The pancreas is normal without ductal  dilatation. Gallbladder is surgically absent. Bilateral adrenal glands  are normal. Both kidneys are normal in size and attenuation. There is no  hydronephrosis. Parapelvic cyst at the upper pole of the right kidney.  There is a hyperdense lesion seen within the right kidney posterior  cortex measuring up to 7 mm that may represent a hemorrhagic cyst. The  urinary bladder is decompressed with a Hoffman catheter. No evidence of  bowel obstruction. Marked colonic diverticulosis is present. No  pathological retroperitoneal lymphadenopathy. Marked calcified  atherosclerotic plaque is seen within the abdominal aorta and its  branches. No ascites is seen. There is rectus diastasis with changes of  prior ventral hernia repair and numerous small recurrent hernias.  Degenerative disc disease is seen in the spine with vacuum disc  phenomena at multiple levels.       Impression:      1. The liver morphology is suspicious for chronic liver disease. The  spleen is normal in size.  2. Marked colonic diverticulosis.  3. Additional findings as above.     Radiation dose reduction techniques were utilized, including automated  exposure control and exposure modulation based on body size.                   ECG 12 Lead  Component   Ref Range & Units 3/2/22 1559   QT Interval   ms 449              HEART RATE= 50  bpm  RR Interval= 1196   ms  AL Interval= 170  ms  P Horizontal Axis= 36  deg  P Front Axis= 60  deg  QRSD Interval= 109  ms  QT Interval= 449  ms  QRS Axis= 64  deg  T Wave Axis= 58  deg  - OTHERWISE NORMAL ECG -  Sinus rhythm  Low voltage, precordial leads  Rate slower             Current Facility-Administered Medications:   •  acetaminophen (TYLENOL) tablet 1,000 mg, 1,000 mg, Oral, Q6H PRN, Mango Arnold MD, 1,000 mg at 03/07/22 2127  •  amLODIPine (NORVASC) tablet 10 mg, 10 mg, Oral, Daily, Mango Arnold MD, 10 mg at 03/08/22 0825  •  famotidine (PEPCID) tablet 10 mg, 10 mg, Oral, BID, Mango Arnold MD  •  furosemide (LASIX) tablet 40 mg, 40 mg, Oral, BID, Beth Butcher MD, 40 mg at 03/08/22 0825  •  gabapentin (NEURONTIN) capsule 200 mg, 200 mg, Oral, Nightly, Jordan Vázquez MD, 200 mg at 03/07/22 2126  •  hydrALAZINE (APRESOLINE) tablet 25 mg, 25 mg, Oral, Q8H, Jordan Vázquez MD, 25 mg at 03/08/22 1331  •  insulin glargine (LANTUS, SEMGLEE) injection 15 Units, 15 Units, Subcutaneous, Nightly, Mango Arnold MD, 15 Units at 03/07/22 2129  •  insulin lispro (ADMELOG) injection 0-7 Units, 0-7 Units, Subcutaneous, TID AC, Mango Arnold MD, 3 Units at 03/08/22 1219  •  insulin lispro (ADMELOG) injection 5 Units, 5 Units, Subcutaneous, TID With Meals, Mango Arnold MD, 5 Units at 03/08/22 1219  •  isosorbide mononitrate (IMDUR) 24 hr tablet 30 mg, 30 mg, Oral, Daily, Mango Arnold MD, 30 mg at 03/08/22 0825  •  levothyroxine (SYNTHROID, LEVOTHROID) tablet 25 mcg, 25 mcg, Oral, Daily, Mango Arnold MD, 25 mcg at 03/08/22 0825  •  LORazepam (ATIVAN) tablet 0.5 mg, 0.5 mg, Oral, Q6H PRN, Mango Arnold MD, 0.5 mg at 03/07/22 2127  •  metoprolol tartrate (LOPRESSOR) tablet 12.5 mg, 12.5 mg, Oral, Q12H, Mango Arnold MD, 12.5 mg at 03/08/22 1217  •  montelukast (SINGULAIR) tablet 10 mg, 10 mg, Oral, Daily, Mango Arnold MD, 10 mg at 03/08/22 0825  •  predniSONE (DELTASONE) tablet 20 mg, 20 mg, Oral, Daily With Breakfast,  Kadeem Rivas MD, 20 mg at 03/08/22 0825  •  rosuvastatin (CRESTOR) tablet 5 mg, 5 mg, Oral, Nightly, Kadeem Rivas MD, 5 mg at 03/07/22 2127  •  [COMPLETED] Insert peripheral IV, , , Once **AND** sodium chloride 0.9 % flush 10 mL, 10 mL, Intravenous, PRN, Tomás Garnett MD, 10 mL at 03/06/22 0816  •  spironolactone (ALDACTONE) tablet 12.5 mg, 12.5 mg, Oral, Daily, Beth Butcher MD, 12.5 mg at 03/08/22 0826  •  tamsulosin (FLOMAX) 24 hr capsule 0.4 mg, 0.4 mg, Oral, Nightly, Kadeem Rivas MD, 0.4 mg at 03/07/22 2127     ASSESSMENT  Acute on chronic diastolic CHF  Acute kidney injury  Chronic kidney disease stage IV  Paroxysmal atrial fibrillation  Aortic stenosis s/p AVR  Diabetes mellitus  Hypertension  Hyperlipidemia  Thrombocytopenia  Pulmonary hypertension  Gastroesophageal reflux disease    PLAN  Discharge to subacute rehab  Discharge summary dictated    KADEEM RIVAS MD

## 2022-03-08 NOTE — PROGRESS NOTES
[facility] INPATIENT PROGRESS NOTE         [address]    3/8/2022      PATIENT IDENTIFICATION:  Name: Helena Carmen ADMIT: 3/2/2022   : 1931  PCP: Mariah Hickman MD    MRN: [id number] LOS: 6 days   AGE/SEX: 89 y.o. female  ROOM: [facility]                     LOS 6    Reason for visit: Pulmonary hypertension and CHF      SUBJECTIVE:      No new issues overnight.    Objective   OBJECTIVE:    Vital Sign Min/Max for last 24 hours  Temp  Min: 97.3 °F (36.3 °C)  Max: 98 °F (36.7 °C)   BP  Min: 145/62  Max: 164/59   Pulse  Min: 52  Max: 62   Resp  Min: 14  Max: 18   SpO2  Min: 96 %  Max: 99 %   No data recorded   Weight  Min: 68.6 kg (151 lb 3.8 oz)  Max: 69.4 kg (153 lb)                         Body mass index is 33.1 kg/m².    Intake/Output Summary (Last 24 hours) at 3/8/2022 0836  Last data filed at 3/8/2022 0403  Gross per 24 hour   Intake 210 ml   Output 2100 ml   Net -1890 ml         Exam:  GEN:  No distress, appears stated age  NECK:  No adenopathy, midline trachea  LUNGS: Nonlabored, diminished on auscultation  CV:  Normal S1S2, without murmur      Assessment     Scheduled meds:  amLODIPine, 10 mg, Oral, Daily  famotidine, 20 mg, Oral, BID  furosemide, 40 mg, Oral, BID  gabapentin, 200 mg, Oral, Nightly  hydrALAZINE, 25 mg, Oral, Q8H  insulin glargine, 15 Units, Subcutaneous, Nightly  insulin lispro, 0-7 Units, Subcutaneous, TID AC  insulin lispro, 5 Units, Subcutaneous, TID With Meals  isosorbide mononitrate, 30 mg, Oral, Daily  levothyroxine, 25 mcg, Oral, Daily  metoprolol tartrate, 12.5 mg, Oral, Q12H  montelukast, 10 mg, Oral, Daily  predniSONE, 20 mg, Oral, Daily With Breakfast  rosuvastatin, 5 mg, Oral, Nightly  spironolactone, 12.5 mg, Oral, Daily  tamsulosin, 0.4 mg, Oral, Nightly      IV meds:                         Data Review:  Results from last 7 days   Lab Units 22  0442 22  0810 22  0654 22  0544 22  0645   SODIUM mmol/L 137 138 140 136  139   POTASSIUM mmol/L 4.1 4.0 3.6 3.8 4.1   CHLORIDE mmol/L 96* 97* 95* 92* 96*   CO2 mmol/L 27.0 26.5 28.0 29.0 29.0   BUN mg/dL 101* 86* 89* 68* 62*   CREATININE mg/dL 2.77* 2.74* 2.92* 2.70* 2.70*   GLUCOSE mg/dL 225* 157* 199* 213* 159*   CALCIUM mg/dL 9.6 10.3* 9.7* 9.8* 9.9*         Estimated Creatinine Clearance: 11.5 mL/min (A) (by C-G formula based on SCr of 2.77 mg/dL (H)).  Results from last 7 days   Lab Units 03/08/22  0442 03/07/22  0810 03/06/22  0654 03/05/22  0544 03/04/22  0645   WBC 10*3/mm3 5.65 6.56 6.75 7.44 6.91   HEMOGLOBIN g/dL 11.7* 12.5 11.8* 11.8* 12.0   PLATELETS 10*3/mm3 105*  105* 108*  108* 90* 59*  59* 44*         Results from last 7 days   Lab Units 03/04/22  0645 03/02/22  1632   ALT (SGPT) U/L 16 20   AST (SGOT) U/L 21 27                 Glucose   Date/Time Value Ref Range Status   03/08/2022 0607 184 (H) 70 - 130 mg/dL Final     Comment:     Meter: [id number] : [id number] Jeffery Davila S NA   03/07/2022 2103 286 (H) 70 - 130 mg/dL Final     Comment:     Meter: [id number] : [id number] Jeffery Dvaila S NA   03/07/2022 1635 198 (H) 70 - 130 mg/dL Final     Comment:     Meter: [id number] : [id number] [facility]   03/07/2022 1115 114 70 - 130 mg/dL Final     Comment:     Meter: [id number] : [id number] [facility]   03/07/2022 0613 194 (H) 70 - 130 mg/dL Final     Comment:     Meter: [id number] : [id number] Jeffery HILL NA   03/06/2022 2052 377 (H) 70 - 130 mg/dL Final     Comment:     Meter: [id number] : [id number] Jeffery HILL NA   03/06/2022 1611 263 (H) 70 - 130 mg/dL Final     Comment:     Meter: [id number] : [id number] Cm JOHNSON     Chest x-ray 3/6 reviewed shows cardiomegaly with improvement in vascular congestion and resolution of previous small pleural effusion compared to previous imaging.        Microbiology reviewed          Active Hospital Problems    Diagnosis  POA   • Other cirrhosis  of liver (HCC) [K74.69]  Unknown   • CHF (congestive heart failure) (HCC) [I50.9]  Yes   • Thrombocytopenia (HCC) [D69.6]  Yes      Resolved Hospital Problems   No resolved problems to display.         ASSESSMENT:    Pulmonary hypertension: WHO group II  Acute on chronic diastolic CHF  Aortic valve replacement  Type 2 diabetes  Hypertension  Thrombocytopenia  Acute kidney injury      PLAN:    Patient's pulmonary hypertension is secondary to left-sided disease and post capillary pulmonary hypertension.  No indication for right heart catheterization in this patient.  No indication for pulmonary arterial hypertensive medications.  Mainstay treatment as blood pressure control and diuretics for CHF.  Improvement with diuresis.  Wean oxygen as able.  Repeat x-ray for follow-up post diuresis show significant improvement in vascular congestion.  Follow peripherally for pulmonary issues.  Working towards skilled nursing facility placement.      Juancarlos Siddiqui MD  Pulmonary and Critical Care Medicine  [facility], [facility]  3/8/2022    08:36 EST

## 2022-03-08 NOTE — PLAN OF CARE
Goal Outcome Evaluation:  Plan of Care Reviewed With: patient        Progress: improving  Outcome Evaluation: pt completed UE AROM 10x2 while sitting in the chair to incr strength and endurance. pt completed brushing her teeth w set up. pt seen today as her DC was cancelled due to transport not able to get her, but per notes EMS will transport her tomorrow am. pt will cont therapy at rehab. pt progressing well.  OT wore all PPE, washed hands before/after.

## 2022-03-09 ENCOUNTER — TELEPHONE (OUTPATIENT)
Dept: ONCOLOGY | Facility: CLINIC | Age: 87
End: 2022-03-09

## 2022-03-09 VITALS
OXYGEN SATURATION: 96 % | DIASTOLIC BLOOD PRESSURE: 61 MMHG | SYSTOLIC BLOOD PRESSURE: 152 MMHG | RESPIRATION RATE: 18 BRPM | HEIGHT: 57 IN | HEART RATE: 59 BPM | WEIGHT: 154.5 LBS | BODY MASS INDEX: 33.33 KG/M2 | TEMPERATURE: 97.8 F

## 2022-03-09 LAB
ALBUMIN SERPL-MCNC: 4.2 G/DL (ref 3.5–5.2)
ANION GAP SERPL CALCULATED.3IONS-SCNC: 15 MMOL/L (ref 5–15)
BASOPHILS # BLD AUTO: 0.01 10*3/MM3 (ref 0–0.2)
BASOPHILS NFR BLD AUTO: 0.1 % (ref 0–1.5)
BUN SERPL-MCNC: 95 MG/DL (ref 8–23)
BUN/CREAT SERPL: 38.3 (ref 7–25)
CALCIUM SPEC-SCNC: 9.6 MG/DL (ref 8.2–9.6)
CHLORIDE SERPL-SCNC: 98 MMOL/L (ref 98–107)
CO2 SERPL-SCNC: 26 MMOL/L (ref 22–29)
CREAT SERPL-MCNC: 2.48 MG/DL (ref 0.57–1)
DEPRECATED RDW RBC AUTO: 47.5 FL (ref 37–54)
EGFRCR SERPLBLD CKD-EPI 2021: 17.9 ML/MIN/1.73
EOSINOPHIL # BLD AUTO: 0.08 10*3/MM3 (ref 0–0.4)
EOSINOPHIL NFR BLD AUTO: 1.1 % (ref 0.3–6.2)
ERYTHROCYTE [DISTWIDTH] IN BLOOD BY AUTOMATED COUNT: 14.1 % (ref 12.3–15.4)
GLUCOSE BLDC GLUCOMTR-MCNC: 138 MG/DL (ref 70–130)
GLUCOSE BLDC GLUCOMTR-MCNC: 307 MG/DL (ref 70–130)
GLUCOSE SERPL-MCNC: 135 MG/DL (ref 65–99)
HCT VFR BLD AUTO: 38.6 % (ref 34–46.6)
HGB BLD-MCNC: 12.3 G/DL (ref 12–15.9)
IMM GRANULOCYTES # BLD AUTO: 0.02 10*3/MM3 (ref 0–0.05)
IMM GRANULOCYTES NFR BLD AUTO: 0.3 % (ref 0–0.5)
LYMPHOCYTES # BLD AUTO: 2.11 10*3/MM3 (ref 0.7–3.1)
LYMPHOCYTES NFR BLD AUTO: 29.2 % (ref 19.6–45.3)
MCH RBC QN AUTO: 29.4 PG (ref 26.6–33)
MCHC RBC AUTO-ENTMCNC: 31.9 G/DL (ref 31.5–35.7)
MCV RBC AUTO: 92.1 FL (ref 79–97)
MONOCYTES # BLD AUTO: 0.65 10*3/MM3 (ref 0.1–0.9)
MONOCYTES NFR BLD AUTO: 9 % (ref 5–12)
NEUTROPHILS NFR BLD AUTO: 4.36 10*3/MM3 (ref 1.7–7)
NEUTROPHILS NFR BLD AUTO: 60.3 % (ref 42.7–76)
NRBC BLD AUTO-RTO: 0 /100 WBC (ref 0–0.2)
PHOSPHATE SERPL-MCNC: 4.1 MG/DL (ref 2.5–4.5)
PLATELET # BLD AUTO: 125 10*3/MM3 (ref 140–450)
PLATELET # BLD AUTO: 125 10*3/MM3 (ref 140–450)
PLATELETS.RETICULATED NFR BLD AUTO: 10.5 % (ref 0.9–6.5)
PMV BLD AUTO: 12.4 FL (ref 6–12)
POTASSIUM SERPL-SCNC: 3.9 MMOL/L (ref 3.5–5.2)
RBC # BLD AUTO: 4.19 10*6/MM3 (ref 3.77–5.28)
SODIUM SERPL-SCNC: 139 MMOL/L (ref 136–145)
WBC NRBC COR # BLD: 7.23 10*3/MM3 (ref 3.4–10.8)

## 2022-03-09 PROCEDURE — 82962 GLUCOSE BLOOD TEST: CPT

## 2022-03-09 PROCEDURE — 85055 RETICULATED PLATELET ASSAY: CPT | Performed by: INTERNAL MEDICINE

## 2022-03-09 PROCEDURE — 63710000001 PREDNISONE PER 5 MG: Performed by: INTERNAL MEDICINE

## 2022-03-09 PROCEDURE — 85025 COMPLETE CBC W/AUTO DIFF WBC: CPT | Performed by: INTERNAL MEDICINE

## 2022-03-09 PROCEDURE — 80069 RENAL FUNCTION PANEL: CPT | Performed by: INTERNAL MEDICINE

## 2022-03-09 PROCEDURE — 63710000001 INSULIN LISPRO (HUMAN) PER 5 UNITS: Performed by: HOSPITALIST

## 2022-03-09 RX ORDER — FUROSEMIDE 40 MG/1
40 TABLET ORAL DAILY
Qty: 30 TABLET | Refills: 0 | Status: SHIPPED | OUTPATIENT
Start: 2022-03-09 | End: 2022-04-04

## 2022-03-09 RX ADMIN — MONTELUKAST SODIUM 10 MG: 10 TABLET, FILM COATED ORAL at 07:39

## 2022-03-09 RX ADMIN — FUROSEMIDE 40 MG: 40 TABLET ORAL at 07:39

## 2022-03-09 RX ADMIN — HYDRALAZINE HYDROCHLORIDE 25 MG: 25 TABLET, FILM COATED ORAL at 06:15

## 2022-03-09 RX ADMIN — PREDNISONE 15 MG: 10 TABLET ORAL at 07:39

## 2022-03-09 RX ADMIN — ISOSORBIDE MONONITRATE 30 MG: 30 TABLET ORAL at 07:39

## 2022-03-09 RX ADMIN — FAMOTIDINE 10 MG: 20 TABLET ORAL at 07:39

## 2022-03-09 RX ADMIN — INSULIN LISPRO 7 UNITS: 100 INJECTION, SOLUTION INTRAVENOUS; SUBCUTANEOUS at 07:42

## 2022-03-09 RX ADMIN — ACETAMINOPHEN 1000 MG: 500 TABLET, FILM COATED ORAL at 07:50

## 2022-03-09 RX ADMIN — LEVOTHYROXINE SODIUM 25 MCG: 0.03 TABLET ORAL at 07:39

## 2022-03-09 RX ADMIN — SPIRONOLACTONE 12.5 MG: 25 TABLET ORAL at 07:39

## 2022-03-09 RX ADMIN — AMLODIPINE BESYLATE 10 MG: 10 TABLET ORAL at 07:39

## 2022-03-09 NOTE — PROGRESS NOTES
[facility] INPATIENT PROGRESS NOTE         [address]    3/9/2022      PATIENT IDENTIFICATION:  Name: Helena Carmen ADMIT: 3/2/2022   : 1931  PCP: Mariah Hickman MD    MRN: [id number] LOS: 7 days   AGE/SEX: 89 y.o. female  ROOM: [facility]                     LOS 7    Reason for visit: Pulmonary hypertension and CHF      SUBJECTIVE:      No new issues overnight.    Objective   OBJECTIVE:    Vital Sign Min/Max for last 24 hours  Temp  Min: 97.2 °F (36.2 °C)  Max: 97.8 °F (36.6 °C)   BP  Min: 132/60  Max: 152/61   Pulse  Min: 54  Max: 62   Resp  Min: 18  Max: 18   SpO2  Min: 96 %  Max: 97 %   No data recorded   Weight  Min: 70.1 kg (154 lb 8 oz)  Max: 70.1 kg (154 lb 8 oz)                         Body mass index is 33.42 kg/m².    Intake/Output Summary (Last 24 hours) at 3/9/2022 0816  Last data filed at 3/9/2022 0520  Gross per 24 hour   Intake 1320 ml   Output 1600 ml   Net -280 ml         Exam:  GEN:  No distress, appears stated age  NECK:  No adenopathy, midline trachea  LUNGS: Nonlabored, diminished on auscultation  CV:  Normal S1S2, without murmur      Assessment     Scheduled meds:  amLODIPine, 10 mg, Oral, Daily  famotidine, 10 mg, Oral, BID  furosemide, 40 mg, Oral, Daily  gabapentin, 200 mg, Oral, Nightly  hydrALAZINE, 25 mg, Oral, Q8H  insulin glargine, 20 Units, Subcutaneous, Nightly  insulin lispro, 0-7 Units, Subcutaneous, TID AC  insulin lispro, 7 Units, Subcutaneous, TID With Meals  isosorbide mononitrate, 30 mg, Oral, Daily  levothyroxine, 25 mcg, Oral, Daily  metoprolol tartrate, 12.5 mg, Oral, Q12H  montelukast, 10 mg, Oral, Daily  predniSONE, 15 mg, Oral, Daily With Breakfast  rosuvastatin, 5 mg, Oral, Nightly  spironolactone, 12.5 mg, Oral, Daily  tamsulosin, 0.4 mg, Oral, Nightly      IV meds:                         Data Review:  Results from last 7 days   Lab Units 22  0442 22  0810 22  0654 22  0544 22  0645   SODIUM mmol/L 137 138  140 136 139   POTASSIUM mmol/L 4.1 4.0 3.6 3.8 4.1   CHLORIDE mmol/L 96* 97* 95* 92* 96*   CO2 mmol/L 27.0 26.5 28.0 29.0 29.0   BUN mg/dL 101* 86* 89* 68* 62*   CREATININE mg/dL 2.77* 2.74* 2.92* 2.70* 2.70*   GLUCOSE mg/dL 225* 157* 199* 213* 159*   CALCIUM mg/dL 9.6 10.3* 9.7* 9.8* 9.9*         Estimated Creatinine Clearance: 11.5 mL/min (A) (by C-G formula based on SCr of 2.77 mg/dL (H)).  Results from last 7 days   Lab Units 03/08/22  0442 03/07/22  0810 03/06/22  0654 03/05/22  0544 03/04/22  0645   WBC 10*3/mm3 5.65 6.56 6.75 7.44 6.91   HEMOGLOBIN g/dL 11.7* 12.5 11.8* 11.8* 12.0   PLATELETS 10*3/mm3 105*  105* 108*  108* 90* 59*  59* 44*         Results from last 7 days   Lab Units 03/04/22  0645 03/02/22  1632   ALT (SGPT) U/L 16 20   AST (SGOT) U/L 21 27                 Glucose   Date/Time Value Ref Range Status   03/09/2022 0615 138 (H) 70 - 130 mg/dL Final     Comment:     Meter: [id number] : [id number] Madelin Reji    03/08/2022 1955 300 (H) 70 - 130 mg/dL Final     Comment:     Meter: [id number] : [id number] Madelin Reji    03/08/2022 1640 307 (H) 70 - 130 mg/dL Final     Comment:     Meter: [id number] : [id number] [facility]   03/08/2022 1156 213 (H) 70 - 130 mg/dL Final     Comment:     Meter: [id number] : [id number] [facility]   03/08/2022 0607 184 (H) 70 - 130 mg/dL Final     Comment:     Meter: [id number] : [id number] Jeffery HILL    03/07/2022 2103 286 (H) 70 - 130 mg/dL Final     Comment:     Meter: [id number] : [id number] Jeffery HILL NA   03/07/2022 1635 198 (H) 70 - 130 mg/dL Final     Comment:     Meter: [id number] : [id number] [facility] [place] CNA     Chest x-ray 3/6 reviewed shows cardiomegaly with improvement in vascular congestion and resolution of previous small pleural effusion compared to previous imaging.        Microbiology reviewed          Active Hospital Problems    Diagnosis  POA   • Other cirrhosis of liver (HCC)  [K74.69]  Unknown   • CHF (congestive heart failure) ([facility]) [I50.9]  Yes   • Thrombocytopenia ([facility]) [D69.6]  Yes      Resolved Hospital Problems   No resolved problems to display.         ASSESSMENT:    Pulmonary hypertension: WHO group II  Acute on chronic diastolic CHF  Aortic valve replacement  Type 2 diabetes  Hypertension  Thrombocytopenia  Acute kidney injury      PLAN:    Patient's pulmonary hypertension is secondary to left-sided disease and post capillary pulmonary hypertension.  No indication for right heart catheterization in this patient.  No indication for pulmonary arterial hypertensive medications.  Mainstay treatment as blood pressure control and diuretics for CHF.  Improvement with diuresis.  Wean oxygen as able.  Repeat x-ray for follow-up post diuresis show significant improvement in vascular congestion.  Follow peripherally for pulmonary issues.  Noted plan for discharge to rehab today.      Juancarlos Siddiqui MD  Pulmonary and Critical Care Medicine  [facility]  3/9/2022    08:16 EST

## 2022-03-09 NOTE — TELEPHONE ENCOUNTER
Phoned [facility], spoke with patient's nurse. Gave verbal orders from Dr. Sherman for CBC in 1 week and 2 weeks, and for results to be faxed to our office. Advised nurse our office would notify of prednisone taper based on lab results. R/v.

## 2022-03-09 NOTE — CASE MANAGEMENT/SOCIAL WORK
Continued Stay Note  [facility]     Patient Name: Helena Carmen  MRN: [id number]  Today's Date: 3/9/2022    Admit Date: 3/2/2022     Discharge Plan     Row Name 03/09/22 1139       Plan    Plan [facility]    Plan Comments Ambulance disclaimer to daughter Radha (3/8)    Final Discharge Disposition Code 03 - skilled nursing facility (SNF)    Final Note DC'd to skilled bed at [facility] via [facility]                       Expected Discharge Date and Time     Expected Discharge Date Expected Discharge Time    Mar 8, 2022             Becky S. [place], RN

## 2022-03-09 NOTE — PLAN OF CARE
Goal Outcome Evaluation:           Progress: improving  Outcome Evaluation: Pt AOX4. Ate breakfast this am. EMS scheduled for 0900. Daughter at bedside. PT dressed and ready to go. No acute distress. Will continue to monitor until d/c

## 2022-03-09 NOTE — PROGRESS NOTES
[facility] Progress Note      Patient Name: Helena Carmen  : 1931  MRN: [id number]  Primary Care Physician:  Mariah Hickman MD  Date of admission: 3/2/2022    Subjective     Interval History:   Followup NADER on CKD 3.  Not soa.   Appetite is fine. Rn reports BM yesterday.   UOP excellent.         Review of Systems:   As noted above    Objective     Vitals:   Temp:  [97.2 °F (36.2 °C)-98 °F (36.7 °C)] 97.2 °F (36.2 °C)  Heart Rate:  [52-62] 57  Resp:  [16-18] 18  BP: (139-164)/(55-67) 141/55  Flow (L/min):  [2] 2    Intake/Output Summary (Last 24 hours) at 3/8/2022 1901  Last data filed at 3/8/2022 1734  Gross per 24 hour   Intake 930 ml   Output 2300 ml   Net -1370 ml       Physical Exam:    Constitutional: Awake, sitting up in bed.  Neck no jvd  Heart RRR, valentín, 2/6M, no rub  Lungs clear to auscultation .  Abd  BS +, soft, nontender, +distended  : No palpable bladder; Hoffman catheter  Musculoskeletal:  Trace edema  Psychiatric: Flat affect; cooperative; fully oriented  Neurologic: moving all extremities, normal speech and mental status  Skin: Warm and dry     Scheduled Meds:     amLODIPine, 10 mg, Oral, Daily  famotidine, 10 mg, Oral, BID  [START ON 3/9/2022] furosemide, 40 mg, Oral, Daily  gabapentin, 200 mg, Oral, Nightly  hydrALAZINE, 25 mg, Oral, Q8H  insulin glargine, 20 Units, Subcutaneous, Nightly  insulin lispro, 0-7 Units, Subcutaneous, TID AC  insulin lispro, 7 Units, Subcutaneous, TID With Meals  isosorbide mononitrate, 30 mg, Oral, Daily  levothyroxine, 25 mcg, Oral, Daily  metoprolol tartrate, 12.5 mg, Oral, Q12H  montelukast, 10 mg, Oral, Daily  [START ON 3/9/2022] predniSONE, 15 mg, Oral, Daily With Breakfast  rosuvastatin, 5 mg, Oral, Nightly  spironolactone, 12.5 mg, Oral, Daily  tamsulosin, 0.4 mg, Oral, Nightly      IV Meds:        Results Reviewed:   I have personally reviewed the results from the time of this admission to 3/8/2022 19:01 EST      Results from last 7 days   Lab Units 03/08/22 0442 03/07/22  0810 03/06/22  0654 03/05/22  0544 03/04/22  0645 03/03/22  1158 03/02/22  1632   SODIUM mmol/L 137 138 140   < > 139   < > 131*   POTASSIUM mmol/L 4.1 4.0 3.6   < > 4.1   < > 4.8   CHLORIDE mmol/L 96* 97* 95*   < > 96*   < > 94*   CO2 mmol/L 27.0 26.5 28.0   < > 29.0   < > 21.1*   BUN mg/dL 101* 86* 89*   < > 62*   < > 63*   CREATININE mg/dL 2.77* 2.74* 2.92*   < > 2.70*   < > 2.79*   CALCIUM mg/dL 9.6 10.3* 9.7*   < > 9.9*   < > 9.2   BILIRUBIN mg/dL  --   --   --   --  0.7  --  0.3   ALK PHOS U/L  --   --   --   --  32*  --  31*   ALT (SGPT) U/L  --   --   --   --  16  --  20   AST (SGOT) U/L  --   --   --   --  21  --  27   GLUCOSE mg/dL 225* 157* 199*   < > 159*   < > 172*    < > = values in this interval not displayed.       Estimated Creatinine Clearance: 11.5 mL/min (A) (by C-G formula based on SCr of 2.77 mg/dL (H)).    Results from last 7 days   Lab Units 03/07/22 0810 03/06/22  0654 03/03/22  1158   MAGNESIUM mg/dL 2.2 2.1 1.9   PHOSPHORUS mg/dL  --   --  4.0       Results from last 7 days   Lab Units 03/04/22  0645   URIC ACID mg/dL 8.9*       Results from last 7 days   Lab Units 03/08/22 0442 03/07/22  0810 03/06/22  0654 03/05/22  0544 03/04/22  0645   WBC 10*3/mm3 5.65 6.56 6.75 7.44 6.91   HEMOGLOBIN g/dL 11.7* 12.5 11.8* 11.8* 12.0   PLATELETS 10*3/mm3 105*  105* 108*  108* 90* 59*  59* 44*             Assessment / Plan     ASSESSMENT:  1.  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  Volume excess, improving;  rising serum calcium with diuresis; resolved hypervolemic hyponatremia with diuresis.   Renal ultrasound essentially negative  2. Acute on chronic diastolic CHF, compensated now  3.  Hypertension, exacerbated by hypervolemia, better with diuresis. Spironolactone added 3/5/22.  4.  Multi-valvular heart disease with prior AVR  5.  DM2  6.  Immobility syndrome.  7.  Urinary  retention: Suspect due to immobility. Keep jimenes for now until stronger .    PLAN:  1.  Oral furosemide daily .        Deanna Pulido MD  03/08/22  19:01 [facility]          [facility]  [phone]

## 2022-03-09 NOTE — PLAN OF CARE
Goal Outcome Evaluation:   Patient resting throughout the night without any c/o pain or discomfort. She is alert, oriented, and cooperative with nursing care and is anticipating returning to [facility] this am. VSS, she is on room air and is able to make her needs known.

## 2022-03-21 ENCOUNTER — OFFICE VISIT (OUTPATIENT)
Dept: CARDIOLOGY | Facility: CLINIC | Age: 87
End: 2022-03-21

## 2022-03-21 ENCOUNTER — TELEPHONE (OUTPATIENT)
Dept: ONCOLOGY | Facility: CLINIC | Age: 87
End: 2022-03-21

## 2022-03-21 VITALS
WEIGHT: 150.6 LBS | OXYGEN SATURATION: 99 % | SYSTOLIC BLOOD PRESSURE: 132 MMHG | BODY MASS INDEX: 32.49 KG/M2 | DIASTOLIC BLOOD PRESSURE: 48 MMHG | HEART RATE: 61 BPM | HEIGHT: 57 IN

## 2022-03-21 DIAGNOSIS — I34.0 NONRHEUMATIC MITRAL VALVE REGURGITATION: ICD-10-CM

## 2022-03-21 DIAGNOSIS — I51.89 DIASTOLIC DYSFUNCTION: ICD-10-CM

## 2022-03-21 DIAGNOSIS — I36.1 NONRHEUMATIC TRICUSPID VALVE REGURGITATION: ICD-10-CM

## 2022-03-21 DIAGNOSIS — I50.33 ACUTE ON CHRONIC DIASTOLIC CHF (CONGESTIVE HEART FAILURE): Primary | ICD-10-CM

## 2022-03-21 DIAGNOSIS — Z95.2 HISTORY OF AORTIC VALVE REPLACEMENT: ICD-10-CM

## 2022-03-21 DIAGNOSIS — I48.0 PAROXYSMAL ATRIAL FIBRILLATION: ICD-10-CM

## 2022-03-21 DIAGNOSIS — I35.0 NONRHEUMATIC AORTIC VALVE STENOSIS: ICD-10-CM

## 2022-03-21 DIAGNOSIS — I10 ESSENTIAL HYPERTENSION: ICD-10-CM

## 2022-03-21 DIAGNOSIS — N18.4 STAGE 4 CHRONIC KIDNEY DISEASE: ICD-10-CM

## 2022-03-21 DIAGNOSIS — I27.20 PULMONARY HYPERTENSION: ICD-10-CM

## 2022-03-21 PROBLEM — R10.31 ABDOMINAL PAIN, RIGHT LOWER QUADRANT: Status: RESOLVED | Noted: 2020-12-13 | Resolved: 2022-03-21

## 2022-03-21 PROBLEM — K85.90 PANCREATITIS: Status: ACTIVE | Noted: 2022-02-22

## 2022-03-21 PROBLEM — E03.9 HYPOTHYROIDISM: Status: ACTIVE | Noted: 2022-02-22

## 2022-03-21 PROBLEM — K85.90 PANCREATITIS: Status: RESOLVED | Noted: 2022-02-22 | Resolved: 2022-03-21

## 2022-03-21 PROBLEM — I50.32 CHRONIC DIASTOLIC CONGESTIVE HEART FAILURE: Status: RESOLVED | Noted: 2022-03-02 | Resolved: 2022-03-21

## 2022-03-21 PROBLEM — I50.9 CHF (CONGESTIVE HEART FAILURE): Status: RESOLVED | Noted: 2022-03-02 | Resolved: 2022-03-21

## 2022-03-21 PROCEDURE — 93000 ELECTROCARDIOGRAM COMPLETE: CPT | Performed by: NURSE PRACTITIONER

## 2022-03-21 PROCEDURE — 99214 OFFICE O/P EST MOD 30 MIN: CPT | Performed by: NURSE PRACTITIONER

## 2022-03-21 RX ORDER — LORAZEPAM 0.5 MG/1
0.5 TABLET ORAL
Status: ON HOLD | COMMUNITY
Start: 2022-02-22 | End: 2022-06-09 | Stop reason: SDUPTHER

## 2022-03-21 RX ORDER — ACETAMINOPHEN 500 MG
1000 TABLET ORAL EVERY 6 HOURS PRN
COMMUNITY
End: 2022-06-27 | Stop reason: ALTCHOICE

## 2022-03-21 RX ORDER — TAMSULOSIN HYDROCHLORIDE 0.4 MG/1
1 CAPSULE ORAL DAILY
COMMUNITY
End: 2022-03-23 | Stop reason: SDUPTHER

## 2022-03-21 NOTE — PROGRESS NOTES
Date of Office Visit: 2022  Encounter Provider: RIGO Giraldo  Place of Service: [facility] CARDIOLOGY  Patient Name: Helena Carmen  :1931  Primary Cardiologist: Dr. Beth Butcher     Chief Complaint   Patient presents with   • Congestive Heart Failure   • Hospital Follow Up Visit   :     HPI: Helena Carmen is a pleasant 89 y.o. female who presents today for follow-up on valvular heart disease and recent hospitalization.  I have reviewed her medical records.     She has been diagnosed with type 2 diabetes mellitus, hypothyroidism, hypertension, hyperlipidemia, and chronic kidney disease followed by Dr. Art Wick.       In 2013, she underwent to aortic valve replacement for severe aortic stenosis.  Postoperatively she developed atrial fibrillation and PVCs.  She was initially treated with warfarin which was discontinued because she did not have any recurrent atrial fibrillation.     In 2019, echocardiogram showed the following: LVEF 56%, grade 2 diastolic dysfunction, mild LVH, left atrium moderately to severely dilated, moderate mitral valve insufficiency, moderate tricuspid regurgitation, RVSP elevated at 69 mmHg, and normal functioning prosthetic aortic valve. She was treated with furosemide and this was discontinued because of her chronic kidney disease.    In 2021, she followed up with Dr. Butcher.  Her blood pressure was 180/72 and and no changes were made to her medication regimen.  She was recommended that she monitor her blood pressure at home to call with an update in a few weeks.  She never did so.    In 2021, she followed up with me in the office.  She reported new onset dyspnea with exertion over 2 months.  Her blood pressure was also elevated in the office.  CMP showed an elevated BUN of 30, creatinine 1.55, proBNP of 4366, and TSH was normal.  I discussed the plan of care with Dr. Butcher's.  I increased her  spironolactone to 25 mg daily and frusemide 40 mg daily.  Dr. Butcher said if her blood pressure remained elevated, we could add hydralazine 50 mg twice per day.  In January 2022, echocardiogram showed LVEF 68%, diastolic function consistent with age, bioprosthetic aortic valve present, peak and mean gradients elevated, and mild tricuspid regurgitation.    She was recently hospitalized for acute on chronic diastolic heart failure with symptoms of shortness of breath, leg swelling, and weight gain.  She was noted to have acute kidney injury and cardiorenal syndrome.  Nephrology arranged IV diuresis and renal ultrasound was negative. She was discharged on oral furosemide.  On 3/9, BUN was 95 and creatinine 2.48.    She presents today for follow-up visit in a wheelchair with her family accompanying her.  We reviewed the hospital records.  She continues to experience shortness of breath both with rest and exertion.  Her lower extremity edema has resolved.  She denies chest pain, PND, orthopnea, cough, palpitations, dizziness, syncope, or bleeding.  Blood pressure and heart rate are normal today.  Oxygen saturation 99%. She is currently residing at the [facility].       Past Medical History:   Diagnosis Date   • Aortic valve stenosis     s/p tissue AVR   • Back pain    • Colitis due to Clostridioides difficile 1/26/2022   • Diastolic dysfunction     Grade 2 per echocardiogram 2013   • Diverticulosis    • Exertional shortness of breath    • Hiatal hernia    • Hyperlipidemia    • Hypertension    • Hyperthyroidism    • Hypertriglyceridemia 5/31/2018   • Hypothyroidism    • Left ventricular hypertrophy    • Legally blind    • Macular degeneration    • Mitral regurgitation    • Osteoarthritis of hip    • Pancreatitis 1/26/2022   • Paroxysmal atrial fibrillation (HCC)    • Premature ventricular contractions    • Pulmonary hypertension (HCC)    • Renal insufficiency syndrome    • Type 2 diabetes  mellitus (HCC)        Past Surgical History:   Procedure Laterality Date   • AORTIC VALVE REPAIR/REPLACEMENT     • CATARACT EXTRACTION      ,    • ENDOSCOPY  08/15/2014    no gross lesions in stomach/duodenum, erythrematous mucosa in stomach   • HYSTERECTOMY     • STERNOTOMY         Social History     Socioeconomic History   • Marital status:    Tobacco Use   • Smoking status: Former Smoker     Packs/day: 0.50     Quit date: 7/10/1969     Years since quittin.7   • Smokeless tobacco: Never Used   Substance and Sexual Activity   • Alcohol use: No     Comment: caffeine use - coffee 2 cups daily   • Drug use: No       Family History   Problem Relation Age of Onset   • Heart disease Mother    • Hypertension Mother    • Stroke Mother    • Diabetes Mother         mellitus   • Other Other         cardiovascular disorder       The following portion of the patient's history were reviewed and updated as appropriate: past medical history, past surgical history, past social history, past family history, allergies, current medications, and problem list.    Review of Systems   Constitutional: Positive for weight loss.   Cardiovascular: Positive for dyspnea on exertion.   Respiratory: Positive for shortness of breath and wheezing.    Hematologic/Lymphatic: Bruises/bleeds easily.   Musculoskeletal: Positive for joint pain and myalgias.   Gastrointestinal: Negative for diarrhea and nausea.   Neurological: Positive for excessive daytime sleepiness.   Psychiatric/Behavioral: The patient is nervous/anxious.        Allergies   Allergen Reactions   • Cephalexin Unknown (See Comments) and Other (See Comments)     Pt states she does not remember reaction but it was many years ago   Pt states she does not remember reaction but it was many years ago   Pt states she does not remember reaction but it was many years ago    • Erythromycin Unknown (See Comments) and Other (See Comments)     Pt states she does not remember  but it was many years ago  Pt states she does not remember but it was many years ago   • Penicillins Rash   • Sulfa Antibiotics Itching and Rash         Current Outpatient Medications:   •  acetaminophen (TYLENOL) 500 MG tablet, Take 500 mg by mouth Every 6 (Six) Hours As Needed for Mild Pain ., Disp: , Rfl:   •  amLODIPine (NORVASC) 10 MG tablet, Take 1 tablet by mouth daily., Disp: , Rfl:   •  famotidine (PEPCID) 10 MG tablet, Take 1 tablet by mouth 2 (Two) Times a Day for 30 days., Disp: 60 tablet, Rfl: 0  •  furosemide (LASIX) 40 MG tablet, Take 1 tablet by mouth Daily., Disp: 30 tablet, Rfl: 0  •  hydrALAZINE (APRESOLINE) 25 MG tablet, Take 1 tablet by mouth Every 8 (Eight) Hours for 30 days., Disp: 90 tablet, Rfl: 0  •  insulin lispro (humaLOG) 100 UNIT/ML injection, Inject  under the skin into the appropriate area as directed 3 (Three) Times a Day Before Meals., Disp: , Rfl:   •  isosorbide mononitrate (IMDUR) 30 MG 24 hr tablet, Take 1 tablet by mouth Daily for 30 days., Disp: 30 tablet, Rfl: 0  •  levothyroxine (SYNTHROID, LEVOTHROID) 25 MCG tablet, Take 1 tablet by mouth daily., Disp: , Rfl:   •  linagliptin (TRADJENTA) 5 MG tablet tablet, Take 5 mg by mouth daily., Disp: , Rfl:   •  LORazepam (ATIVAN) 0.5 MG tablet, Take 0.5 mg by mouth., Disp: , Rfl:   •  magnesium hydroxide (MILK OF MAGNESIA) 400 MG/5ML suspension, Take  by mouth Daily As Needed for Constipation., Disp: , Rfl:   •  metoprolol tartrate (LOPRESSOR) 25 MG tablet, Take 1 tablet by mouth 2 (Two) Times a Day for 30 days., Disp: 60 tablet, Rfl: 0  •  montelukast (SINGULAIR) 10 MG tablet, Take 1 tablet by mouth daily., Disp: , Rfl:   •  Polyethylene Glycol 3350 (MIRALAX PO), Take  by mouth., Disp: , Rfl:   •  predniSONE (DELTASONE) 5 MG tablet, Take 3 tablets by mouth Daily With Breakfast., Disp: 90 tablet, Rfl: 0  •  rosuvastatin (CRESTOR) 5 MG tablet, Take 1 tablet by mouth Every Night for 30 days., Disp: 30 tablet, Rfl: 0  •  spironolactone  "(ALDACTONE) 25 MG tablet, Take 0.5 tablets by mouth Daily for 30 days., Disp: 15 tablet, Rfl: 0  •  tamsulosin (FLOMAX) 0.4 MG capsule 24 hr capsule, Take 1 capsule by mouth Daily., Disp: , Rfl:   •  gabapentin (NEURONTIN) 100 MG capsule, Take 2 capsules by mouth Every Night for 3 days., Disp: 6 capsule, Rfl: 0        Objective:     Vitals:    03/21/22 1305 03/21/22 1324   BP: 126/48 132/48   BP Location: Left arm Right arm   Pulse: 61    Weight: 69.9 kg (154 lb)    Height: 144.8 cm (57\")      Body mass index is 33.33 kg/m².    PHYSICAL EXAM:    Vitals Reviewed.   General Appearance: No acute distress, well developed and well nourished. Obese.  In a wheelchair.  Eyes: Conjunctiva and lids: No erythema, swelling, or discharge. Sclera non-icteric. Glasses.   HENT: Atraumatic, normocephalic. External eyes, ears, and nose normal. No hearing loss noted. Mucous membranes normal. Lips not cyanotic. Neck supple with no tenderness. Wearing mask.   Respiratory: No signs of respiratory distress. Respiration rhythm and depth normal.   Clear to auscultation. No rales, crackles, rhonchi, or wheezing auscultated.   Cardiovascular:  Jugular Venous Pressure: Normal  Heart Rate and Rhythm: Normal, Heart Sounds: Normal S1 and S2. No S3 or S4 noted.  Murmurs: LUSB grade 2/6 murmur present.  No rubs, thrills, or gallops.   Lower Extremities: No edema noted.  Gastrointestinal:  Abdomen soft, non-distended, non-tender.    Musculoskeletal: Normal movement of extremities.  Skin and Nails: General appearance normal. No pallor, cyanosis, diaphoresis. Skin temperature normal. No clubbing of fingernails.   Psychiatric: Patient alert and oriented to person, place, and time. Speech and behavior appropriate. Normal mood and affect.       ECG 12 Lead    Date/Time: 3/21/2022 1:17 PM  Performed by: Luly Pina APRN  Authorized by: Luly Pina APRN   Comparison: compared with previous ECG from 3/2/2022  Similar to previous ECG  Rhythm: " sinus bradycardia  Rate: bradycardic  BPM: 50  Conduction: non-specific intraventricular conduction delay  ST Segments: ST segments normal  T Waves: T waves normal  QRS axis: normal    Clinical impression: non-specific ECG              Assessment:       Diagnosis Plan   1. Acute on chronic diastolic CHF (congestive heart failure) ([facility])     2. Diastolic dysfunction  Ambulatory Referral to Heart Failure Clinic   3. Nonrheumatic aortic valve stenosis     4. History of aortic valve replacement     5. Nonrheumatic mitral valve regurgitation     6. Nonrheumatic tricuspid valve regurgitation     7. Pulmonary hypertension ([facility])     8. Essential hypertension     9. Stage 4 chronic kidney disease ([facility])     10. Paroxysmal atrial fibrillation ([facility])            Plan:       1/2.  Acute on Chronic Diastolic Heart Failure: Cardiorenal syndrome.  NYHA class II.  Well compensated today and euvolemic.  Reports mild shortness of breath and lower extremity edema has resolved.  Weights have been stable.  Continue current medication regimen.  ARB discontinued by nephrology in the hospital.  Currently taking furosemide 40 mg every other day and spironolactone daily.      3/4.  Aortic Stenosis: Status post aortic valve replacement in June 2013.  Gradients elevated per echocardiogram in January 2022.    5/6.  Trace mitral regurgitation and mild tricuspid regurgitation per echocardiogram January 2022.    7.  Pulmonary Hypertension: Severe per echocardiogram in 2019; normal per recent echocardiogram.    8.  Hypertension: Blood pressure stable today.    9.  Stage IV Chronic Kidney Disease: Follows with Dr. Art Wick and seeing him tomorrow.    10.  Paroxysmal Atrial Fibrillation: Occurred postoperatively in 2013.  She is reported to brief episodes of palpitations. She is no longer on warfarin due to risk of bleeding. She has a [id number] score of 5, putting her at high risk for thromboembolic event annually.    11.  I recommend referral to  heart failure clinic.  I also asked the patient to give some thought to what her wishes and once are as far as how aggressive she wants the treatment plan to be.  I did mention the possibility if they wanted to consider palliative care at some point in the future.  I think she is actually doing very well post hospitalization today.    12.  I recommended follow-up with Dr. Butcher in the next 4 to 6 weeks.      As always, it has been a pleasure to participate in your patient's care. Thank you.       Sincerely,         RIGO Pedro  [facility]      · Dictated utilizing Dragon Dictation  · COVID-19 Precautions - Patient was compliant in wearing a mask. When I saw the patient, I used appropriate personal protective equipment (PPE) including mask and eye shield (standard procedure).  Additionally, I used gown and gloves if indicated.  Hand hygiene was completed before and after seeing the patient.  · I spent 41 minutes reviewing her medical records/testing/previous office notes/labs, face-to-face interaction with patient, physical examination, formulating the plan of care, and discussion of plan of care with patient.

## 2022-03-21 NOTE — TELEPHONE ENCOUNTER
Caller: Radha Hoyos    Relationship: Emergency Contact    Best call back number: [phone]    What is the best time to reach you: ANYTIME    Who are you requesting to speak with (clinical staff, provider,  specific staff member): SCHEDULING    What was the call regarding: PTS DAUGHTER CALLED TO [facility] 3/29 APPT - STATED 750 IS TOO EARLY IN THE MORNING FOR PT SINCE SHE IS IN REHAB. THEY WOULD LIKE AN AFTERNOON APPT INSTEAD.     PLEASE CALL TO [facility].     Do you require a callback: YES

## 2022-03-23 ENCOUNTER — OFFICE VISIT (OUTPATIENT)
Dept: ONCOLOGY | Facility: CLINIC | Age: 87
End: 2022-03-23

## 2022-03-23 ENCOUNTER — TELEPHONE (OUTPATIENT)
Dept: ONCOLOGY | Facility: CLINIC | Age: 87
End: 2022-03-23

## 2022-03-23 ENCOUNTER — LAB (OUTPATIENT)
Dept: LAB | Facility: HOSPITAL | Age: 87
End: 2022-03-23

## 2022-03-23 VITALS
TEMPERATURE: 97.1 F | OXYGEN SATURATION: 97 % | HEART RATE: 62 BPM | RESPIRATION RATE: 16 BRPM | SYSTOLIC BLOOD PRESSURE: 146 MMHG | BODY MASS INDEX: 33.16 KG/M2 | HEIGHT: 57 IN | WEIGHT: 153.7 LBS | DIASTOLIC BLOOD PRESSURE: 71 MMHG

## 2022-03-23 DIAGNOSIS — D69.6 THROMBOCYTOPENIA: ICD-10-CM

## 2022-03-23 DIAGNOSIS — D69.6 THROMBOCYTOPENIA: Primary | ICD-10-CM

## 2022-03-23 LAB
ALBUMIN SERPL-MCNC: 4 G/DL (ref 3.5–5.2)
ALBUMIN/GLOB SERPL: 1.4 G/DL (ref 1.1–2.4)
ALP SERPL-CCNC: 62 U/L (ref 38–116)
ALT SERPL W P-5'-P-CCNC: 46 U/L (ref 0–33)
ANION GAP SERPL CALCULATED.3IONS-SCNC: 14.9 MMOL/L (ref 5–15)
AST SERPL-CCNC: 40 U/L (ref 0–32)
BASOPHILS # BLD AUTO: 0.03 10*3/MM3 (ref 0–0.2)
BASOPHILS NFR BLD AUTO: 0.2 % (ref 0–1.5)
BILIRUB SERPL-MCNC: 0.4 MG/DL (ref 0.2–1.2)
BUN SERPL-MCNC: 71 MG/DL (ref 6–20)
BUN/CREAT SERPL: 34.6 (ref 7.3–30)
CALCIUM SPEC-SCNC: 9.4 MG/DL (ref 8.5–10.2)
CHLORIDE SERPL-SCNC: 96 MMOL/L (ref 98–107)
CO2 SERPL-SCNC: 20.1 MMOL/L (ref 22–29)
CREAT SERPL-MCNC: 2.05 MG/DL (ref 0.6–1.1)
DEPRECATED RDW RBC AUTO: 48 FL (ref 37–54)
EGFRCR SERPLBLD CKD-EPI 2021: 22.5 ML/MIN/1.73
EOSINOPHIL # BLD AUTO: 0.11 10*3/MM3 (ref 0–0.4)
EOSINOPHIL NFR BLD AUTO: 0.8 % (ref 0.3–6.2)
ERYTHROCYTE [DISTWIDTH] IN BLOOD BY AUTOMATED COUNT: 14.4 % (ref 12.3–15.4)
GLOBULIN UR ELPH-MCNC: 2.9 GM/DL (ref 1.8–3.5)
GLUCOSE SERPL-MCNC: 89 MG/DL (ref 74–124)
HCT VFR BLD AUTO: 42.3 % (ref 34–46.6)
HGB BLD-MCNC: 13.8 G/DL (ref 12–15.9)
IMM GRANULOCYTES # BLD AUTO: 0.08 10*3/MM3 (ref 0–0.05)
IMM GRANULOCYTES NFR BLD AUTO: 0.5 % (ref 0–0.5)
LYMPHOCYTES # BLD AUTO: 2.78 10*3/MM3 (ref 0.7–3.1)
LYMPHOCYTES NFR BLD AUTO: 19 % (ref 19.6–45.3)
MCH RBC QN AUTO: 29.6 PG (ref 26.6–33)
MCHC RBC AUTO-ENTMCNC: 32.6 G/DL (ref 31.5–35.7)
MCV RBC AUTO: 90.8 FL (ref 79–97)
MONOCYTES # BLD AUTO: 1.41 10*3/MM3 (ref 0.1–0.9)
MONOCYTES NFR BLD AUTO: 9.6 % (ref 5–12)
NEUTROPHILS NFR BLD AUTO: 10.21 10*3/MM3 (ref 1.7–7)
NEUTROPHILS NFR BLD AUTO: 69.9 % (ref 42.7–76)
NRBC BLD AUTO-RTO: 0 /100 WBC (ref 0–0.2)
PLATELET # BLD AUTO: 64 10*3/MM3 (ref 140–450)
PLATELETS.RETICULATED NFR BLD AUTO: 10.5 % (ref 0.9–6.5)
PMV BLD AUTO: 11.6 FL (ref 6–12)
POTASSIUM SERPL-SCNC: 4.5 MMOL/L (ref 3.5–4.7)
PROT SERPL-MCNC: 6.9 G/DL (ref 6.3–8)
RBC # BLD AUTO: 4.66 10*6/MM3 (ref 3.77–5.28)
SODIUM SERPL-SCNC: 131 MMOL/L (ref 134–145)
WBC NRBC COR # BLD: 14.62 10*3/MM3 (ref 3.4–10.8)

## 2022-03-23 PROCEDURE — 99215 OFFICE O/P EST HI 40 MIN: CPT | Performed by: INTERNAL MEDICINE

## 2022-03-23 PROCEDURE — 36415 COLL VENOUS BLD VENIPUNCTURE: CPT

## 2022-03-23 PROCEDURE — 85025 COMPLETE CBC W/AUTO DIFF WBC: CPT

## 2022-03-23 PROCEDURE — 80053 COMPREHEN METABOLIC PANEL: CPT

## 2022-03-23 PROCEDURE — 85055 RETICULATED PLATELET ASSAY: CPT

## 2022-03-23 RX ORDER — PREDNISONE 20 MG/1
10 TABLET ORAL
COMMUNITY
End: 2022-04-04

## 2022-03-23 RX ORDER — POLYETHYLENE GLYCOL 3350 17 G/17G
17 POWDER, FOR SOLUTION ORAL DAILY
Status: ON HOLD | COMMUNITY

## 2022-03-23 RX ORDER — ISOSORBIDE MONONITRATE 30 MG/1
1 TABLET, EXTENDED RELEASE ORAL
COMMUNITY
Start: 2021-10-22 | End: 2022-03-23 | Stop reason: SDUPTHER

## 2022-03-23 RX ORDER — BISACODYL 10 MG
10 SUPPOSITORY, RECTAL RECTAL DAILY PRN
COMMUNITY
End: 2022-06-09 | Stop reason: HOSPADM

## 2022-03-23 RX ORDER — TAMSULOSIN HYDROCHLORIDE 0.4 MG/1
0.4 CAPSULE ORAL
Status: ON HOLD | COMMUNITY

## 2022-03-23 RX ORDER — INSULIN GLARGINE 100 [IU]/ML
22 INJECTION, SOLUTION SUBCUTANEOUS NIGHTLY
Status: ON HOLD | COMMUNITY
End: 2022-06-09 | Stop reason: SDUPTHER

## 2022-03-23 RX ORDER — ROSUVASTATIN CALCIUM 5 MG/1
1 TABLET, COATED ORAL DAILY
COMMUNITY
End: 2022-09-06

## 2022-03-23 NOTE — PROGRESS NOTES
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  She was diuresed with improvement in her shortness of breath and edema.  She was found to have thrombocytopenia which appeared to be longstanding with baseline platelet count in the 50-60,000 range.  Platelet count on admission 3/2/2022 had declined to 39,000.  IPF was elevated at 12.2% suggesting a peripheral destructive process.  Additional labs including paraprotein studies were negative.  There was some question regarding underlying cirrhosis with cirrhotic morphology on CT scan however there was no evidence of splenomegaly and LFTs were normal.  With decline in platelet count from her baseline she was started on prednisone at 30 mg daily, tapered to 20 mg daily with significant response, platelet count increased up to 125,000 at the time of discharge on 3/9/2022.  We had intended for her to decrease prednisone from 20 down to 15 mg at discharge.    Since discharge however the patient appears to have continued on prednisone at 20 mg daily.  We were to have had labs faxed weekly for tapering of her steroid dose.  Labs were performed however have not been forwarded to our office.  We do have labs to review today showing count of 124,000 on 3/16/2022 and declined down to 57,000 on 3/21/2022.  The patient's daughter reports that the nursing facility increased her prednisone dose on their own up to 25 mg daily today.    Today the patient has no specific complaints.  She is in a wheelchair.  She has been working with physical therapy and has been ambulating to some extent with a walker.  She feels that her strength is improving slightly.  She has not experienced  "any bleeding issues apart from some slight blood-tinged sinus drainage.  She has been constipated.  Her daughter reports she did have one episode of significant anxiety over the past week which subsided.  She does note that she has a lung nodule and is scheduled for a CT chest tomorrow to follow this up and they are not sure that they want to move ahead with this as she has been to doctors appointments every day this week and has been quite fatigued.      Objective   Vital Signs:   /71   Pulse 62   Temp 97.1 °F (36.2 °C) (Temporal)   Resp 16   Ht 144 cm (56.69\")   Wt 69.7 kg (153 lb 11.2 oz)   SpO2 97%   BMI 33.62 kg/m²     Physical Exam  Constitutional:       Appearance: She is well-developed.      Comments: Elderly female no distress seated in wheelchair   Eyes:      Conjunctiva/sclera: Conjunctivae normal.   Neck:      Thyroid: No thyromegaly.   Cardiovascular:      Rate and Rhythm: Normal rate and regular rhythm.      Heart sounds: Murmur heard.     No friction rub. No gallop.   Pulmonary:      Effort: No respiratory distress.      Breath sounds: Normal breath sounds.   Chest:   Breasts:      Right: No supraclavicular adenopathy.      Left: No supraclavicular adenopathy.       Abdominal:      General: Bowel sounds are normal. There is no distension.      Palpations: Abdomen is soft.      Tenderness: There is no abdominal tenderness.   Musculoskeletal:         General: Swelling present.      Comments: Trace bilateral lower extremity edema   Lymphadenopathy:      Head:      Right side of head: No submandibular adenopathy.      Cervical: No cervical adenopathy.      Upper Body:      Right upper body: No supraclavicular adenopathy.      Left upper body: No supraclavicular adenopathy.   Skin:     General: Skin is warm and dry.      Findings: No rash.   Neurological:      Mental Status: She is alert and oriented to person, place, and time.      Cranial Nerves: No cranial nerve deficit.      Motor: No " abnormal muscle tone.      Deep Tendon Reflexes: Reflexes normal.   Psychiatric:         Behavior: Behavior normal.        Result Review : Reviewed CBC, CMP, IPF from today.       Assessment and Plan     *Thrombocytopenia  · Patient with apparent longstanding history of thrombocytopenia  · Previous CT scan with suggestion of chronic liver disease/cirrhotic liver morphology, last CT 4/15/2019 with normal spleen  · Platelet count 3/22/2019 was 52,000 and on 1/26/2022 was 60,000  · On admission 3/2/2022, platelet count 39,000  · Additional labs 3/3/2022 with IPF elevated at 12.2% indicative of peripheral destructive process and has remained elevated in the 10-12% range.  · Additional labs 3/3/2022 with positive BECCA at 1: 320 dilution with homogeneous pattern, negative BECCA panel  · On 3/4/2022, B12 low normal at 238, folate greater than 20, negative serum protein electrophoresis and immunoelectrophoresis with free kappa light chain 65.6, free lambda light chain 37.1 and free light chain ratio 1.77 (appropriate for degree of renal dysfunction), LDH borderline elevated 238  · CT abdomen and pelvis 3/8/2022 with liver morphology suspicious for chronic liver disease, spleen normal in size at 10.4 cm.  No other abnormalities.  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    · Unclear whether  improvement in thrombocytopenia is related to steroids, B12 replacement, or improvement in CHF/volume overload.  · Recommended decreasing prednisone dose down to 15 mg daily prior to transition to subacute nursing facility on 3/9/2022.  Patient however was discharged on prednisone 20 mg daily.  Requested that nursing facility forward labs weekly monitor platelet count and further gradually taper prednisone dose however this did not occur.  · Patient returns today in follow-up with labs to review continuing  on prednisone at the nursing facility.  She had been receiving prednisone 20 mg daily and the nursing facility increased her prednisone this morning up to 25 mg daily due to a platelet count on 3/21/2022 that had declined to 57,000.  Today,.  Platelet count is 64,000.  I had a lengthy discussion with the patient and her daughter today.  We discussed that it appears she has adequate marrow function in terms of platelet production given the persistent elevation in IPF, currently at 10.5%.  This suggests a peripheral destructive process such as ITP or possibly splenic sequestration.  There has been suggestion of underlying cirrhosis on previous scans however there has been no evidence of splenomegaly associated with this.  The other possibility is ITP and she has been treated empirically for this with prednisone during hospitalization, unclear if the improvement in platelet count transiently was related to steroids.  It appears that her prior baseline platelet count prior to her hospitalization was in the 50-54574 range.  She may be returning to her baseline range currently at 64,000.  Certainly platelet count in this range is acceptable and she has not been experiencing any bleeding complications.  Therefore we discussed attempting to taper her platelet count further as long as she is maintaining generally in the 50-60,000 range.  I would not pursue increase in steroid dose nor additional measures such as IVIG or rituximab unless her platelet count declined more significantly into the 30,000 range or below consistently.  The patient and her daughter were in agreement.  We contacted nursing facility and we will taper her prednisone dose down to 15 mg daily.  We will check a CBC weekly at the nursing facility which is to be faxed to our office and we will determine on a weekly basis whether to taper her prednisone dose slowly.  I will see the patient back in 4 weeks with CBC, CMP, IPF.     *Borderline B12  deficiency  · B12 level on 3/4/2022 was 238  · Patient initiated B12 1000 mcg IM injection daily x3 days and oral B12 1000 mcg daily  · The patient does not appear to have continued on oral B12 and we have sent an order to the nursing facility to resume oral B12 1000 mcg daily.     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  Patient does follow-up with nephrology     *Acute on chronic diastolic CHF  · History of severe aortic stenosis status post aortic valve replacement in June 2013  · Patient admitted with progressive generalized weakness and dyspnea on exertion  · Patient receiving diuretics currently with Lasix 40 mg every other day.     *Diabetes mellitus type 2  · Exacerbated by steroids     *Possible chronic liver disease  · Prior CTs with notation of cirrhotic liver morphology  · CT abdomen pelvis 4/15/2019 showed liver with a mildly shrunken appearance concerning for chronic liver disease.  Spleen however was normal in size.  · LFTs normal on 3/2/2022  · CT abdomen and pelvis 3/8/2022 with liver morphology suspicious for chronic liver disease, spleen normal in size at 10.4 cm.  No other abnormalities.  · Significance of the liver appearance on scans is unclear.  · Today, patient does have elevated LFTs with ALT 46, AST 40, normal total bilirubin 0.4.    *GI prophylaxis  · Patient is currently receiving Pepcid 10 mg twice daily    *Pulmonary nodule  · Patient underwent CT scan at [facility] on 9/7/2021 that showed a 4 mm subpleural left lower lobe nodule  · Patient was scheduled for upcoming CT chest with thoracic surgery however she does not wish at this point to proceed with the scan.  Given her age and limited ability to treat a malignancy and with a high probability that this is a benign finding we will go ahead and cancel the scan.     Plan:  1. We will decrease prednisone to 15 mg daily beginning  tomorrow  2. Resume oral B12 replacement 1000 mcg daily  3. Continue GI prophylaxis with Pepcid  4. Patient wishes to cancel her CT appointment for tomorrow  5. We are contacting nursing facility today with orders for weekly CBC beginning 3/30/2022 to be faxed to our office.  We will plan weekly prednisone taper based on platelet count.  Goal is to maintain platelet count in the 50-60,000 range or above.  6. MD visit in 4 weeks with CBC, CMP, IPF.  We will discuss further recommendations for prednisone taper.      I spent 45 minutes caring for Helena on this date of service. This time includes time spent by me in the following activities:preparing for the visit, reviewing tests, obtaining and/or reviewing a separately obtained history, performing a medically appropriate examination and/or evaluation , counseling and educating the patient/family/caregiver, ordering medications, tests, or procedures, referring and communicating with other health care professionals , documenting information in the medical record and care coordination the patient and her daughter had multiple questions regarding her condition and plans for further treatment.  We also coordinated upcoming laboratory draws and steroid management with nursing facility.

## 2022-03-23 NOTE — TELEPHONE ENCOUNTER
Phoned patient's rehab facility, spoke with patient's nurse Jennifer. Gave verbal orders from Dr. Sherman for the following: weekly CBCs x3 weeks starting next week, with results faxed to this RN's attention; oral B12 1000 mcg daily; prednisone 15 mg daily starting tomorrow. Advised nurse that our office would be in touch weekly regarding prednisone tapering. R/v.

## 2022-03-24 ENCOUNTER — APPOINTMENT (OUTPATIENT)
Dept: CT IMAGING | Facility: HOSPITAL | Age: 87
End: 2022-03-24

## 2022-03-29 ENCOUNTER — APPOINTMENT (OUTPATIENT)
Dept: LAB | Facility: HOSPITAL | Age: 87
End: 2022-03-29

## 2022-03-30 ENCOUNTER — APPOINTMENT (OUTPATIENT)
Dept: CT IMAGING | Facility: HOSPITAL | Age: 87
End: 2022-03-30

## 2022-04-04 ENCOUNTER — TELEPHONE (OUTPATIENT)
Dept: ONCOLOGY | Facility: CLINIC | Age: 87
End: 2022-04-04

## 2022-04-04 ENCOUNTER — HOSPITAL ENCOUNTER (OUTPATIENT)
Dept: CARDIOLOGY | Facility: HOSPITAL | Age: 87
Discharge: HOME | End: 2022-04-04

## 2022-04-04 VITALS
BODY MASS INDEX: 33.87 KG/M2 | SYSTOLIC BLOOD PRESSURE: 142 MMHG | WEIGHT: 157 LBS | HEART RATE: 68 BPM | HEIGHT: 57 IN | DIASTOLIC BLOOD PRESSURE: 60 MMHG | OXYGEN SATURATION: 98 %

## 2022-04-04 DIAGNOSIS — I48.0 PAROXYSMAL ATRIAL FIBRILLATION: ICD-10-CM

## 2022-04-04 DIAGNOSIS — I27.20 PULMONARY HYPERTENSION: ICD-10-CM

## 2022-04-04 DIAGNOSIS — E11.22 TYPE 2 DIABETES MELLITUS WITH STAGE 4 CHRONIC KIDNEY DISEASE, WITH LONG-TERM CURRENT USE OF INSULIN: ICD-10-CM

## 2022-04-04 DIAGNOSIS — I50.32 CHRONIC HEART FAILURE WITH PRESERVED EJECTION FRACTION: Primary | ICD-10-CM

## 2022-04-04 DIAGNOSIS — N18.4 TYPE 2 DIABETES MELLITUS WITH STAGE 4 CHRONIC KIDNEY DISEASE, WITH LONG-TERM CURRENT USE OF INSULIN: ICD-10-CM

## 2022-04-04 DIAGNOSIS — Z79.4 TYPE 2 DIABETES MELLITUS WITH STAGE 4 CHRONIC KIDNEY DISEASE, WITH LONG-TERM CURRENT USE OF INSULIN: ICD-10-CM

## 2022-04-04 DIAGNOSIS — N18.4 STAGE 4 CHRONIC KIDNEY DISEASE: ICD-10-CM

## 2022-04-04 DIAGNOSIS — I10 ESSENTIAL HYPERTENSION: ICD-10-CM

## 2022-04-04 PROBLEM — Z95.2 HISTORY OF AORTIC VALVE REPLACEMENT: Status: RESOLVED | Noted: 2017-05-10 | Resolved: 2022-04-04

## 2022-04-04 PROCEDURE — 99214 OFFICE O/P EST MOD 30 MIN: CPT | Performed by: NURSE PRACTITIONER

## 2022-04-04 RX ORDER — FUROSEMIDE 40 MG/1
40 TABLET ORAL EVERY OTHER DAY
COMMUNITY
End: 2022-05-18

## 2022-04-04 RX ORDER — SODIUM BICARBONATE 650 MG/1
650 TABLET ORAL 3 TIMES DAILY
COMMUNITY
End: 2022-06-09 | Stop reason: HOSPADM

## 2022-04-04 RX ORDER — PREDNISONE 1 MG/1
10 TABLET ORAL
Status: ON HOLD | COMMUNITY
End: 2022-08-02 | Stop reason: SDUPTHER

## 2022-04-04 RX ORDER — GABAPENTIN 300 MG/1
300 CAPSULE ORAL 2 TIMES DAILY
Status: ON HOLD | COMMUNITY
End: 2022-06-09 | Stop reason: SDUPTHER

## 2022-04-04 NOTE — TELEPHONE ENCOUNTER
Per Dr. Sherman, called Spoke with pt's daughter and nurse at [facility] with instructions to decrease prednisone to 10mg /daily. They both v/u.

## 2022-04-04 NOTE — PROGRESS NOTES
[facility] HEART FAILURE      Patient Name: Helena Carmen  :[id number]  Age: 89 y.o.  Sex: female  Referring Provider: Luly Pina APRN   Primary Cardiologist: Beth Butcher MD  Encounter Provider:  RIGO Lowe      Chief Complaint:   Chief Complaint   Patient presents with   • Congestive Heart Failure         History of Present Illness this 89-year-old female, new to this provider, comes today for further evaluation regarding her chronic diastolic heart failure.  Current diagnoses to include CKD 4, aortic valve disease status post history of AVR with tissue valve, hyperlipidemia, hypertension, hiatal hernia, legally blind, paroxysmal atrial fibrillation, pulmonary hypertension, type 2 diabetes mellitus, history of uterine cancer.    Her history of diastolic dysfunction goes back as far as at least .  Repeat echocardiogram 2019 revealed LVEF of 56% with grade 2 LV diastolic dysfunction, RVSP of 69 mmHg, trivial pericardial effusion noted at that time thus Lasix was started.    It is noted that during appointment with RIGO late 2022 that she had been off of spironolactone for 1 to 2 weeks and with that had increased bilateral lower extremity edema as well as abdominal distention.  She received 1 dose of IV Lasix in clinic on 2022 and oral Lasix was restarted at 40 mg twice daily.  Nephrology, of note, stopped losartan, increased hydralazine, and decreased Lasix on 2022.    Admission from 3/2/2022 to 3/9/2022 was noted with acute on chronic exacerbation of her diastolic heart failure and acute kidney injury.  She has since been treated with Lasix 40 mg every other day and daily spironolactone per nephrology recommendation.    She is currently only much better since discharging to the skilled nursing facility.  She has limited control over her diet given they are preparing all of her food.  She is also trying to prevent a pressure ulcer which sounds to be  may be forming on her left heel.  She is wheelchair dependent, is unable to ambulate on her own at all, and does have some shortness of breath although she does not exert herself very often.  She states that the first thing she notices when she begins to hold onto fluid is abdominal distention.      The following portions of the patient's history were reviewed and updated as appropriate: allergies, current medications, past family history, past medical history, past social history, past surgical history and problem list.    Current Outpatient Medications   Medication Sig Dispense Refill   • acetaminophen (TYLENOL) 500 MG tablet Take 500 mg by mouth Every 6 (Six) Hours As Needed for Mild Pain .     • amLODIPine (NORVASC) 10 MG tablet Take 1 tablet by mouth daily.     • bisacodyl (DULCOLAX) 10 MG suppository Insert 10 mg into the rectum.     • Cyanocobalamin (VITAMIN B 12 PO) Take  by mouth.     • famotidine (PEPCID) 10 MG tablet Take 1 tablet by mouth 2 (Two) Times a Day for 30 days. 60 tablet 0   • furosemide (LASIX) 40 MG tablet Take 40 mg by mouth Every Other Day.     • gabapentin (NEURONTIN) 300 MG capsule Take 300 mg by mouth 2 (Two) Times a Day.     • hydrALAZINE (APRESOLINE) 25 MG tablet Take 1 tablet by mouth Every 8 (Eight) Hours for 30 days. 90 tablet 0   • insulin glargine (LANTUS, SEMGLEE) 100 UNIT/ML injection Inject 22 Units under the skin into the appropriate area as directed Daily.     • Insulin Lispro (ADMELOG SOLOSTAR SC) Inject  under the skin into the appropriate area as directed.     • isosorbide mononitrate (IMDUR) 30 MG 24 hr tablet Take 1 tablet by mouth Daily for 30 days. 30 tablet 0   • levothyroxine (SYNTHROID, LEVOTHROID) 25 MCG tablet Take 1 tablet by mouth daily.     • linagliptin (TRADJENTA) 5 MG tablet tablet Take 5 mg by mouth daily.     • LORazepam (ATIVAN) 0.5 MG tablet Take 0.5 mg by mouth.     • magnesium hydroxide (MILK OF MAGNESIA) 400 MG/5ML suspension Take  by mouth Daily As  Needed for Constipation.     • metoprolol tartrate (LOPRESSOR) 25 MG tablet Take 12.5 mg by mouth 2 (Two) Times a Day.     • montelukast (SINGULAIR) 10 MG tablet Take 1 tablet by mouth daily.     • polyethylene glycol (MIRALAX) 17 GM/SCOOP powder Take 17 g by mouth.     • predniSONE (DELTASONE) 10 MG tablet Take 10 mg by mouth Daily.     • rosuvastatin (CRESTOR) 5 MG tablet Take 1 tablet by mouth Every Night for 30 days. 30 tablet 0   • rosuvastatin (CRESTOR) 5 MG tablet Take 1 tablet by mouth Daily.     • sodium bicarbonate 650 MG tablet Take 650 mg by mouth 3 (Three) Times a Day.     • spironolactone (ALDACTONE) 25 MG tablet Take 0.5 tablets by mouth Daily for 30 days. 15 tablet 0   • tamsulosin (FLOMAX) 0.4 MG capsule 24 hr capsule Take 0.4 mg by mouth.       No current facility-administered medications for this encounter.       Past Medical History:   Diagnosis Date   • Aortic valve stenosis     s/p tissue AVR   • Back pain    • CKD (chronic kidney disease)    • Colitis due to Clostridioides difficile 01/26/2022   • Diastolic dysfunction     Grade 2 per echocardiogram 2013   • Diverticulosis    • Exertional shortness of breath    • Heart disease    • Hiatal hernia    • Hyperlipidemia    • Hypertension    • Hyperthyroidism    • Hypertriglyceridemia 05/31/2018   • Hypothyroidism    • Left ventricular hypertrophy    • Legally blind    • Liver disease    • Macular degeneration    • Mitral regurgitation    • Osteoarthritis of hip    • Pancreatitis 01/26/2022   • Paroxysmal atrial fibrillation (HCC)    • Premature ventricular contractions    • Pulmonary hypertension (HCC)    • Renal insufficiency syndrome    • Type 2 diabetes mellitus (HCC)    • Uterine cancer (HCC)        Past Surgical History:   Procedure Laterality Date   • AORTIC VALVE REPAIR/REPLACEMENT     • CATARACT EXTRACTION      1970, 1999   • ENDOSCOPY  08/15/2014    no gross lesions in stomach/duodenum, erythrematous mucosa in stomach   • HYSTERECTOMY   2007   • STERNOTOMY         Physical Exam  Vitals and nursing note reviewed.   Constitutional:       General: She is not in acute distress.     Appearance: She is well-developed. She is obese. She is ill-appearing.   HENT:      Head: Normocephalic and atraumatic.   Eyes:      Conjunctiva/sclera: Conjunctivae normal.      Pupils: Pupils are equal, round, and reactive to light.   Neck:      Vascular: No JVD.   Cardiovascular:      Rate and Rhythm: Normal rate and regular rhythm.      Heart sounds: Normal heart sounds. No murmur heard.    No friction rub. No gallop.   Pulmonary:      Effort: Pulmonary effort is normal. No respiratory distress.      Breath sounds: Normal breath sounds.   Abdominal:      General: Bowel sounds are normal. There is no distension.      Palpations: Abdomen is soft.   Musculoskeletal:         General: No swelling or deformity.   Skin:     General: Skin is warm and dry.      Capillary Refill: Capillary refill takes less than 2 seconds.   Neurological:      Mental Status: She is alert and oriented to person, place, and time. Mental status is at baseline.   Psychiatric:         Mood and Affect: Mood normal.         Behavior: Behavior normal.          Review of Systems   Constitutional: Positive for fatigue. Negative for unexpected weight change.   HENT: Negative for congestion and nosebleeds.    Eyes: Negative for photophobia and visual disturbance.   Respiratory: Positive for shortness of breath. Negative for cough and chest tightness.    Cardiovascular: Negative for chest pain, palpitations and leg swelling.   Gastrointestinal: Negative for abdominal distention and blood in stool.   Endocrine: Negative for polyphagia and polyuria.   Genitourinary: Positive for frequency. Negative for urgency.   Musculoskeletal: Negative for joint swelling and myalgias.   Skin: Negative for pallor and rash.   Neurological: Positive for weakness. Negative for dizziness, syncope, light-headedness, numbness and  "headaches.   Hematological: Does not bruise/bleed easily.   Psychiatric/Behavioral: Positive for sleep disturbance. Negative for confusion.        OBJECTIVE:  /60 (BP Location: Right arm, Patient Position: Sitting)   Pulse 68   Ht 144 cm (56.69\")   Wt 71.2 kg (157 lb)   SpO2 98%   BMI 34.34 kg/m²      Body mass index is 34.34 kg/m².  Wt Readings from Last 1 Encounters:   04/04/22 71.2 kg (157 lb)       Lab Review:  Renal Function: Estimated Creatinine Clearance: 15.7 mL/min (A) (by C-G formula based on SCr of 2.05 mg/dL (C)).    Lab Results   Component Value Date    PROBNP 6,928.0 (H) 03/02/2022       Results for orders placed during the hospital encounter of 01/26/22    Adult Transthoracic Echo Complete W/ Cont if Necessary Per Protocol    Interpretation Summary  · Estimated left ventricular EF = 68% Left ventricular systolic function is normal.  · Left ventricular diastolic function is consistent with age.  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 The prosthetic aortic valve peak and mean gradients are elevated.  · Peak velocity of the flow distal to the aortic valve is 294.5 cm/s.  · Mild tricuspid valve regurgitation is present. Estimated right ventricular systolic pressure from tricuspid regurgitation is mildly elevated (35-45 mmHg). Calculated right ventricular systolic pressure from tricuspid regurgitation is 37.7 mmHg.      Procedures      6 MINUTE WALK                      Cardiac Procedures:  1. N/A       Previously trialed diuretics  Lasix      Previously trialed GDMT    Spironolactone  Losartan      ASSESSMENT:     Diagnosis Plan   1. Chronic heart failure with preserved ejection fraction (HCC)     2. Paroxysmal atrial fibrillation (HCC)     3. Type 2 diabetes mellitus with stage 4 chronic kidney disease, with long-term current use of insulin (HCC)     4. Stage 4 chronic kidney disease (HCC)     5. Pulmonary hypertension (HCC)     6. Essential hypertension           PLAN OF CARE:  1.  " HFpEF-NYHA class III-IV.  Most recent ejection fraction 60% per echocardiogram.  Renal function precludes GDMT.    Currently diuresed on Lasix every other day as well as on spironolactone daily.  I would like to continue this.  We will check a BMP.  We will serve the touch point for her in a resource in order to keep her out of the hospital.  Unfortunately, we will not be able to optimize her medications, and there is limited control that she will have over her diet while she is in the SNF.  We reviewed the importance of a low-sodium diet of 2000 mg daily or less, fluid restriction of 60 to 70 ounces given her renal failure, and the importance of early notification to the heart failure clinic of any signs or symptoms of worsening heart failure.  She states that abdominal distention is the most notable thing that she sees immediately, so I asked that she please watch for this and let us know soon as this emerges.    Directions for when to call the clinic reviewed with the patient to include weight gain of 2 to 3 pounds in 24 hours, weight gain of 5 to 10 pounds within 7 days; worsening shortness of breath; worsening lower extremity edema or abdominal distention.    2.  Limited mobility-patient is wheelchair-bound.  This limits her activity level considerably.    3.  Hypertension-currently stable and controlled.    4.  Hyperlipidemia-stable and controlled.     5.  Paroxysmal atrial fibrillation-rhythm regular on auscultation today, rate controlled.  Currently on Lopressor.    6.  DM2-stable and well controlled on insulin.    7.  CKD 4-followed by nephrology.  We will monitor and follow along with them.        BMP; continue current GDMT; follow-up in 3 weeks or sooner if needed        Advance Care Planning   Will discuss at subsequent visit      Thank you for allowing me to participate in the care of your patient,         RIGO Lowe  [facility] HEART FAILURE  04/04/22  14:47 EDT      **Dragon  Disclaimer:**  Much of this encounter note is an electronic transcription/translation of spoken language to printed text. The electronic translation of spoken language may permit erroneous, or at times, nonsensical words or phrases to be inadvertently transcribed. Although I have reviewed the note for such errors, some may still exist.

## 2022-04-12 ENCOUNTER — TELEPHONE (OUTPATIENT)
Dept: CARDIOLOGY | Facility: HOSPITAL | Age: 87
End: 2022-04-12

## 2022-04-12 NOTE — TELEPHONE ENCOUNTER
Pt's daughter Radha called to let us know that her mom's weight is up 2 pounds from yesterday. I asked daughter how Pt is feeling and she said Pt is not short of breath but may have some increased edema.  Pt is in rehab right now so daughter does not really know.  Her vital signs are good per daughter but she does not know about Pt's diet.  She may be getting too much sodium.  I spoke with Mirta and let Radha know that we will watch it overnight.  If she has increased edema, shortness of air, or weight gain overnight, then we will increase her diuretic. All questions and concerns addressed. Understanding and agreement verbalized.      Claudia Mason RN  [facility] Failure [facility]

## 2022-04-13 NOTE — TELEPHONE ENCOUNTER
Spoke with pt daughter she states pt weight yesterday was 163 lbs and today 165 lbs. No unusual soa, some puffiness around ankle where socks are but nothing extreme. Pt taking Lasix 40 mg every other day per pt med list in chart.    Per Mirta SIERRA pt to take extra 20 mg lasix today BNP and BMP tomorrow 4/14/22.      Called s/w Olga at [facility] [phone] gave instructions and orders

## 2022-04-19 NOTE — PROGRESS NOTES
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  She is continuing to reside in a nursing facility for subacute rehab.  She reports that she has been improving over time in regards to her level of activity.  She is nearing the point where she may be able to go home.  She has had a few issues recently developing a sacral decubitus ulcer as well as an ulceration on her left heel.  She reports that the wound care nurse did see her for the heel lesion and debrided it for 5 days ago but has not returned.  She also slipped and had a minor fall in the shower and had a minor injury to her left ankle as well as some pain in her right neck that has persisted.  She has not experienced any bleeding issues.  She does have some chronic constipation and is receiving MiraLAX as needed.  She reports that her respiratory status has improved over time.  She is anxious to go home.  We did have labs performed with CBC on 3/31/2022 with platelet count of 65,000 and we did decrease her prednisone dose from 15 down to 10 mg daily on 4/4/2022.  She had a CBC performed on 4/14/2022 however this was never forwarded to our office.  The patient provided a copy of the labs today with hemoglobin of 9.9 and platelet count of 60,000.  We had discussed with the nursing facility the need to forward the labs to our office at her last visit here.      Objective   Vital Signs:   /70   Pulse 76   Temp 97.1 °F (36.2 °C) (Temporal)   Resp 16   Ht 144 cm (56.69\")   Wt 74 kg (163 lb 3.2 oz)   SpO2 98%   BMI 35.70 kg/m²     Physical Exam  Constitutional:       Appearance: She is well-developed.      Comments: Elderly female no distress seated in wheelchair   Eyes:      Conjunctiva/sclera: Conjunctivae normal.   Neck:      Thyroid: " No thyromegaly.   Cardiovascular:      Rate and Rhythm: Normal rate and regular rhythm.      Heart sounds: Murmur heard.     No friction rub. No gallop.   Pulmonary:      Effort: No respiratory distress.      Breath sounds: Normal breath sounds.   Chest:   Breasts:      Right: No supraclavicular adenopathy.      Left: No supraclavicular adenopathy.       Abdominal:      General: Bowel sounds are normal. There is no distension.      Palpations: Abdomen is soft.      Tenderness: There is no abdominal tenderness.   Musculoskeletal:         General: Swelling present.      Comments: Trace bilateral lower extremity edema   Lymphadenopathy:      Head:      Right side of head: No submandibular adenopathy.      Cervical: No cervical adenopathy.      Upper Body:      Right upper body: No supraclavicular adenopathy.      Left upper body: No supraclavicular adenopathy.   Skin:     General: Skin is warm and dry.      Findings: No rash.      Comments: There is a 1.5 cm diameter area of ulceration in the left heel which is fairly shallow, no evidence of infection.   Neurological:      Mental Status: She is alert and oriented to person, place, and time.      Cranial Nerves: No cranial nerve deficit.      Motor: No abnormal muscle tone.      Deep Tendon Reflexes: Reflexes normal.   Psychiatric:         Behavior: Behavior normal.        Result Review : Reviewed CBC, CMP, IPF from today.         Assessment and Plan     *Thrombocytopenia  · Patient with apparent longstanding history of thrombocytopenia  · Previous CT scan with suggestion of chronic liver disease/cirrhotic liver morphology, last CT 4/15/2019 with normal spleen  · Platelet count 3/22/2019 was 52,000 and on 1/26/2022 was 60,000  · On admission 3/2/2022, platelet count 39,000  · Additional labs 3/3/2022 with IPF elevated at 12.2% indicative of peripheral destructive process and has remained elevated in the 10-12% range.  · Additional labs 3/3/2022 with positive BECCA at 1:  320 dilution with homogeneous pattern, negative BECCA panel  · On 3/4/2022, B12 low normal at 238, folate greater than 20, negative serum protein electrophoresis and immunoelectrophoresis with free kappa light chain 65.6, free lambda light chain 37.1 and free light chain ratio 1.77 (appropriate for degree of renal dysfunction), LDH borderline elevated 238  · CT abdomen and pelvis 3/8/2022 with liver morphology suspicious for chronic liver disease, spleen normal in size at 10.4 cm.  No other abnormalities.  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    · Unclear whether  improvement in thrombocytopenia was related to steroids, B12 replacement, or improvement in CHF/volume overload.  · Recommended decreasing prednisone dose down to 15 mg daily prior to transition to subacute nursing facility on 3/9/2022.  Patient however was discharged on prednisone 20 mg daily.  Requested that nursing facility forward labs weekly monitor platelet count and further gradually taper prednisone dose however this did not occur.  · On 3/23/2022, platelet count was 64,000.  Tapered prednisone from 20 down to 15 mg daily.  Intend to maintain platelet count above the 50-68286 range.  Consider additional treatment options with IVIG or rituximab if platelet count declines into the 30,000 range or below consistently.  · 3/31/2022 platelet count 65,000 and on 4/4/2022 prednisone was tapered down to 10 mg daily  · On 4/20/2022, platelet count of 96,000 and prednisone tapered down to 5 mg daily.  · Patient returns today in follow-up, currently receiving prednisone at 10 mg daily last tapered on 4/4/2022.  We are attempting to maintain a platelet count at least above the 50-21218 range while tapering steroids.  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  There has been some consideration of underlying liver  disease as a contributing factor however spleen has been normal in size on imaging studies.  Nevertheless, platelet count today is improved at 96,000.  Interestingly IPF has normalized at 5.2%.  We will take this opportunity to further decrease her prednisone dose down to 5 mg daily.  Per nursing facility is not responsive in obtaining and forwarding labs.  We have discussed this with him extensively.  Hopefully she will be discharged from there soon.  We will go ahead and schedule her in our office with nurse practitioner visit in 2 weeks and again in 4 weeks and pending her platelet count consider additional prednisone taper.  I will see her back in 6 weeks for follow-up.    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     *Diabetes mellitus type 2  · Exacerbated by steroids     *Possible chronic liver disease  · Prior CTs with notation of cirrhotic liver morphology  · CT abdomen pelvis 4/15/2019 showed liver with a mildly shrunken appearance  concerning for chronic liver disease.  Spleen however was normal in size.  · LFTs normal on 3/2/2022  · CT abdomen and pelvis 3/8/2022 with liver morphology suspicious for chronic liver disease, spleen normal in size at 10.4 cm.  No other abnormalities.  · Significance of the liver appearance on scans is unclear.  · On 3/23/2022, elevated LFTs with ALT 46, AST 40, normal total bilirubin 0.4.  · Today, LFTs normal    *GI prophylaxis  · Patient is currently receiving Pepcid 10 mg twice daily    *Pulmonary nodule  · Patient underwent CT scan at [facility] urology on 9/7/2021 that showed a 4 mm subpleural left lower lobe nodule  · Patient was scheduled for upcoming CT chest with thoracic surgery however declined to proceed with the scan.  Given her age and limited ability to treat a malignancy and with a high probability that this is a benign finding scan was canceled.    *Sacral and left heel ulcerations  · Patient has developed sacral and left heel ulcerations.  Patient reports that left heel was debrided by wound care team at the nursing facility 4 days ago however no additional dressing or treatment was applied.  There is a shallow open area on the heel currently and we will notify the nursing facility wound care team needs to readdress both sites.     Plan:  1. Additional labs today with iron panel, ferritin, B12, folate.  2. We are contacting the nursing facility with order to decrease prednisone from 15 down to 10 mg daily  3. We will contact nursing facility today to request that patient be seen by the wound care nurse due to her left heel and sacral ulcerations  4. Continue oral B12 replacement 1000 mcg daily  5. Continue GI prophylaxis with Pepcid 10 mg twice daily  6. The patient will hopefully be discharged from the nursing facility fairly soon  7. In 2 weeks and again in 4 weeks nurse practitioner visit with CBC, CMP, IPF and potential further prednisone taper  8. In 6 weeks MD visit with CBC, CMP, IPF    I did  spend a total of 40 minutes in time caring for the patient today, time spent in review of records, face-to-face consultation, coordination of care, placement of orders, completion of documentation

## 2022-04-20 ENCOUNTER — LAB (OUTPATIENT)
Dept: LAB | Facility: HOSPITAL | Age: 87
End: 2022-04-20

## 2022-04-20 ENCOUNTER — OFFICE VISIT (OUTPATIENT)
Dept: ONCOLOGY | Facility: CLINIC | Age: 87
End: 2022-04-20

## 2022-04-20 ENCOUNTER — TELEPHONE (OUTPATIENT)
Dept: ONCOLOGY | Facility: CLINIC | Age: 87
End: 2022-04-20

## 2022-04-20 VITALS
WEIGHT: 163.2 LBS | BODY MASS INDEX: 35.21 KG/M2 | HEIGHT: 57 IN | DIASTOLIC BLOOD PRESSURE: 70 MMHG | OXYGEN SATURATION: 98 % | HEART RATE: 76 BPM | TEMPERATURE: 97.1 F | RESPIRATION RATE: 16 BRPM | SYSTOLIC BLOOD PRESSURE: 155 MMHG

## 2022-04-20 DIAGNOSIS — D69.6 THROMBOCYTOPENIA: ICD-10-CM

## 2022-04-20 DIAGNOSIS — D69.6 THROMBOCYTOPENIA: Primary | ICD-10-CM

## 2022-04-20 LAB
ALBUMIN SERPL-MCNC: 3.7 G/DL (ref 3.5–5.2)
ALBUMIN/GLOB SERPL: 1.2 G/DL (ref 1.1–2.4)
ALP SERPL-CCNC: 60 U/L (ref 38–116)
ALT SERPL W P-5'-P-CCNC: 29 U/L (ref 0–33)
ANION GAP SERPL CALCULATED.3IONS-SCNC: 13.7 MMOL/L (ref 5–15)
AST SERPL-CCNC: 17 U/L (ref 0–32)
BASOPHILS # BLD AUTO: 0.02 10*3/MM3 (ref 0–0.2)
BASOPHILS NFR BLD AUTO: 0.2 % (ref 0–1.5)
BILIRUB SERPL-MCNC: 0.6 MG/DL (ref 0.2–1.2)
BUN SERPL-MCNC: 57 MG/DL (ref 6–20)
BUN/CREAT SERPL: 28.8 (ref 7.3–30)
CALCIUM SPEC-SCNC: 9.8 MG/DL (ref 8.5–10.2)
CHLORIDE SERPL-SCNC: 101 MMOL/L (ref 98–107)
CO2 SERPL-SCNC: 23.3 MMOL/L (ref 22–29)
CREAT SERPL-MCNC: 1.98 MG/DL (ref 0.6–1.1)
DEPRECATED RDW RBC AUTO: 49.8 FL (ref 37–54)
EGFRCR SERPLBLD CKD-EPI 2021: 23.5 ML/MIN/1.73
EOSINOPHIL # BLD AUTO: 0 10*3/MM3 (ref 0–0.4)
EOSINOPHIL NFR BLD AUTO: 0 % (ref 0.3–6.2)
ERYTHROCYTE [DISTWIDTH] IN BLOOD BY AUTOMATED COUNT: 15.3 % (ref 12.3–15.4)
FERRITIN SERPL-MCNC: 260.2 NG/ML (ref 13–150)
FOLATE SERPL-MCNC: 16 NG/ML (ref 4.78–24.2)
GLOBULIN UR ELPH-MCNC: 3.1 GM/DL (ref 1.8–3.5)
GLUCOSE SERPL-MCNC: 273 MG/DL (ref 74–124)
HCT VFR BLD AUTO: 35.1 % (ref 34–46.6)
HGB BLD-MCNC: 11.1 G/DL (ref 12–15.9)
IMM GRANULOCYTES # BLD AUTO: 0.05 10*3/MM3 (ref 0–0.05)
IMM GRANULOCYTES NFR BLD AUTO: 0.6 % (ref 0–0.5)
IRON 24H UR-MRATE: 31 MCG/DL (ref 37–145)
IRON SATN MFR SERPL: 9 % (ref 14–48)
LYMPHOCYTES # BLD AUTO: 0.96 10*3/MM3 (ref 0.7–3.1)
LYMPHOCYTES NFR BLD AUTO: 11.4 % (ref 19.6–45.3)
MCH RBC QN AUTO: 29.1 PG (ref 26.6–33)
MCHC RBC AUTO-ENTMCNC: 31.6 G/DL (ref 31.5–35.7)
MCV RBC AUTO: 91.9 FL (ref 79–97)
MONOCYTES # BLD AUTO: 0.32 10*3/MM3 (ref 0.1–0.9)
MONOCYTES NFR BLD AUTO: 3.8 % (ref 5–12)
NEUTROPHILS NFR BLD AUTO: 7.04 10*3/MM3 (ref 1.7–7)
NEUTROPHILS NFR BLD AUTO: 84 % (ref 42.7–76)
NRBC BLD AUTO-RTO: 0 /100 WBC (ref 0–0.2)
PLATELET # BLD AUTO: 96 10*3/MM3 (ref 140–450)
PLATELETS.RETICULATED NFR BLD AUTO: 5.2 % (ref 0.9–6.5)
PMV BLD AUTO: 10.8 FL (ref 6–12)
POTASSIUM SERPL-SCNC: 4.8 MMOL/L (ref 3.5–4.7)
PROT SERPL-MCNC: 6.8 G/DL (ref 6.3–8)
RBC # BLD AUTO: 3.82 10*6/MM3 (ref 3.77–5.28)
SODIUM SERPL-SCNC: 138 MMOL/L (ref 134–145)
TIBC SERPL-MCNC: 328 MCG/DL (ref 249–505)
TRANSFERRIN SERPL-MCNC: 234 MG/DL (ref 200–360)
VIT B12 BLD-MCNC: 811 PG/ML (ref 211–946)
WBC NRBC COR # BLD: 8.39 10*3/MM3 (ref 3.4–10.8)

## 2022-04-20 PROCEDURE — 82746 ASSAY OF FOLIC ACID SERUM: CPT | Performed by: INTERNAL MEDICINE

## 2022-04-20 PROCEDURE — 80053 COMPREHEN METABOLIC PANEL: CPT

## 2022-04-20 PROCEDURE — 82607 VITAMIN B-12: CPT | Performed by: INTERNAL MEDICINE

## 2022-04-20 PROCEDURE — 85055 RETICULATED PLATELET ASSAY: CPT

## 2022-04-20 PROCEDURE — 83540 ASSAY OF IRON: CPT | Performed by: INTERNAL MEDICINE

## 2022-04-20 PROCEDURE — 36415 COLL VENOUS BLD VENIPUNCTURE: CPT

## 2022-04-20 PROCEDURE — 84466 ASSAY OF TRANSFERRIN: CPT | Performed by: INTERNAL MEDICINE

## 2022-04-20 PROCEDURE — 85025 COMPLETE CBC W/AUTO DIFF WBC: CPT

## 2022-04-20 PROCEDURE — 99215 OFFICE O/P EST HI 40 MIN: CPT | Performed by: INTERNAL MEDICINE

## 2022-04-20 PROCEDURE — 82728 ASSAY OF FERRITIN: CPT | Performed by: INTERNAL MEDICINE

## 2022-04-20 NOTE — TELEPHONE ENCOUNTER
Phoned patient's nursing facility, [facility]. Spoke with Amanda, patient's nurse. Gave verbal orders per Dr. Sherman to decrease patient's prednisone to 5 mg daily and to have the wound care team reassess wounds to patient's left heel and sacrum. Orders r/v.

## 2022-04-25 ENCOUNTER — LAB (OUTPATIENT)
Dept: LAB | Facility: HOSPITAL | Age: 87
End: 2022-04-25

## 2022-04-25 ENCOUNTER — HOSPITAL ENCOUNTER (OUTPATIENT)
Dept: CARDIOLOGY | Facility: HOSPITAL | Age: 87
Discharge: HOME | End: 2022-04-25

## 2022-04-25 VITALS
BODY MASS INDEX: 35.64 KG/M2 | SYSTOLIC BLOOD PRESSURE: 142 MMHG | RESPIRATION RATE: 20 BRPM | HEART RATE: 62 BPM | DIASTOLIC BLOOD PRESSURE: 84 MMHG | HEIGHT: 57 IN | OXYGEN SATURATION: 97 % | WEIGHT: 165.2 LBS

## 2022-04-25 DIAGNOSIS — E11.22 TYPE 2 DIABETES MELLITUS WITH STAGE 4 CHRONIC KIDNEY DISEASE, WITH LONG-TERM CURRENT USE OF INSULIN: ICD-10-CM

## 2022-04-25 DIAGNOSIS — Z79.4 TYPE 2 DIABETES MELLITUS WITH STAGE 4 CHRONIC KIDNEY DISEASE, WITH LONG-TERM CURRENT USE OF INSULIN: ICD-10-CM

## 2022-04-25 DIAGNOSIS — I48.0 PAROXYSMAL ATRIAL FIBRILLATION: ICD-10-CM

## 2022-04-25 DIAGNOSIS — I10 ESSENTIAL HYPERTENSION: ICD-10-CM

## 2022-04-25 DIAGNOSIS — N18.4 STAGE 4 CHRONIC KIDNEY DISEASE: ICD-10-CM

## 2022-04-25 DIAGNOSIS — N18.4 TYPE 2 DIABETES MELLITUS WITH STAGE 4 CHRONIC KIDNEY DISEASE, WITH LONG-TERM CURRENT USE OF INSULIN: ICD-10-CM

## 2022-04-25 DIAGNOSIS — I50.32 CHRONIC HEART FAILURE WITH PRESERVED EJECTION FRACTION: Primary | ICD-10-CM

## 2022-04-25 DIAGNOSIS — I27.20 PULMONARY HYPERTENSION: ICD-10-CM

## 2022-04-25 LAB
ANION GAP SERPL CALCULATED.3IONS-SCNC: 15 MMOL/L (ref 5–15)
BUN SERPL-MCNC: 55 MG/DL (ref 8–23)
BUN/CREAT SERPL: 28.9 (ref 7–25)
CALCIUM SPEC-SCNC: 9.5 MG/DL (ref 8.2–9.6)
CHLORIDE SERPL-SCNC: 100 MMOL/L (ref 98–107)
CO2 SERPL-SCNC: 20 MMOL/L (ref 22–29)
CREAT SERPL-MCNC: 1.9 MG/DL (ref 0.57–1)
EGFRCR SERPLBLD CKD-EPI 2021: 24.7 ML/MIN/1.73
GLUCOSE SERPL-MCNC: 132 MG/DL (ref 65–99)
POTASSIUM SERPL-SCNC: 4.5 MMOL/L (ref 3.5–5.2)
SODIUM SERPL-SCNC: 135 MMOL/L (ref 136–145)

## 2022-04-25 PROCEDURE — G0463 HOSPITAL OUTPT CLINIC VISIT: HCPCS

## 2022-04-25 PROCEDURE — 80048 BASIC METABOLIC PNL TOTAL CA: CPT

## 2022-04-25 PROCEDURE — 99214 OFFICE O/P EST MOD 30 MIN: CPT | Performed by: NURSE PRACTITIONER

## 2022-04-25 RX ORDER — TETRACYCLINE HYDROCHLORIDE 500 MG/1
500 CAPSULE ORAL 2 TIMES DAILY
COMMUNITY
End: 2022-05-18 | Stop reason: ALTCHOICE

## 2022-04-25 NOTE — ADDENDUM NOTE
Encounter addended by: Claudia Mason RN on: 4/25/2022 3:57 PM   Actions taken: Charge Capture section accepted

## 2022-04-25 NOTE — PROGRESS NOTES
[facility] HEART FAILURE      Patient Name: Helena Carmen  :[id number]  Age: 89 y.o.  Sex: female  Referring Provider: Beth Butcher MD   Primary Cardiologist: Beth Butcher MD  Encounter Provider:  RIGO Lowe      Chief Complaint:   Chief Complaint   Patient presents with   • Congestive Heart Failure         History of Present Illness this 89-year-old female, new to this provider, comes today for further evaluation regarding her chronic diastolic heart failure.  Current diagnoses to include CKD 4, aortic valve disease status post history of AVR with tissue valve, hyperlipidemia, hypertension, hiatal hernia, legally blind, paroxysmal atrial fibrillation, pulmonary hypertension, type 2 diabetes mellitus, history of uterine cancer.    Her history of diastolic dysfunction goes back as far as at least .  Repeat echocardiogram 2019 revealed LVEF of 56% with grade 2 LV diastolic dysfunction, RVSP of 69 mmHg, trivial pericardial effusion noted at that time thus Lasix was started.    It is noted that during appointment with RIGO late 2022 that she had been off of spironolactone for 1 to 2 weeks and with that had increased bilateral lower extremity edema as well as abdominal distention.  She received 1 dose of IV Lasix in clinic on 2022 and oral Lasix was restarted at 40 mg twice daily.  Nephrology, of note, stopped losartan, increased hydralazine, and decreased Lasix on 2022.    Admission from 3/2/2022 to 3/9/2022 was noted with acute on chronic exacerbation of her diastolic heart failure and acute kidney injury.  She has since been treated with Lasix 40 mg every other day and daily spironolactone per nephrology recommendation.    Her potassium level has been trending up, and her renal function has been improving.  She is now currently feeling well per patient report.  She has no complaints of shortness of breath or swelling at this time.      The following  portions of the patient's history were reviewed and updated as appropriate: allergies, current medications, past family history, past medical history, past social history, past surgical history and problem list.    Current Outpatient Medications   Medication Sig Dispense Refill   • acetaminophen (TYLENOL) 500 MG tablet Take 500 mg by mouth Every 6 (Six) Hours As Needed for Mild Pain .     • amLODIPine (NORVASC) 10 MG tablet Take 1 tablet by mouth daily.     • bisacodyl (DULCOLAX) 10 MG suppository Insert 10 mg into the rectum.     • Cyanocobalamin (VITAMIN B 12 PO) Take  by mouth.     • furosemide (LASIX) 40 MG tablet Take 40 mg by mouth Every Other Day.     • gabapentin (NEURONTIN) 300 MG capsule Take 300 mg by mouth 2 (Two) Times a Day.     • insulin glargine (LANTUS, SEMGLEE) 100 UNIT/ML injection Inject 22 Units under the skin into the appropriate area as directed Daily.     • Insulin Lispro (ADMELOG SOLOSTAR SC) Inject  under the skin into the appropriate area as directed.     • levothyroxine (SYNTHROID, LEVOTHROID) 25 MCG tablet Take 1 tablet by mouth daily.     • linagliptin (TRADJENTA) 5 MG tablet tablet Take 5 mg by mouth daily.     • LORazepam (ATIVAN) 0.5 MG tablet Take 0.5 mg by mouth.     • magnesium hydroxide (MILK OF MAGNESIA) 400 MG/5ML suspension Take  by mouth Daily As Needed for Constipation.     • metoprolol tartrate (LOPRESSOR) 25 MG tablet Take 12.5 mg by mouth 2 (Two) Times a Day.     • montelukast (SINGULAIR) 10 MG tablet Take 1 tablet by mouth daily.     • polyethylene glycol (MIRALAX) 17 GM/SCOOP powder Take 17 g by mouth.     • predniSONE (DELTASONE) 10 MG tablet Take 5 mg by mouth Daily.     • rosuvastatin (CRESTOR) 5 MG tablet Take 1 tablet by mouth Daily.     • sodium bicarbonate 650 MG tablet Take 650 mg by mouth 3 (Three) Times a Day.     • spironolactone (ALDACTONE) 25 MG tablet Take 0.5 tablets by mouth Daily for 30 days. 15 tablet 0   • tamsulosin (FLOMAX) 0.4 MG capsule 24 hr  capsule Take 0.4 mg by mouth.     • hydrALAZINE (APRESOLINE) 25 MG tablet Take 1 tablet by mouth Every 8 (Eight) Hours for 30 days. 90 tablet 0   • isosorbide mononitrate (IMDUR) 30 MG 24 hr tablet Take 1 tablet by mouth Daily for 30 days. 30 tablet 0   • rosuvastatin (CRESTOR) 5 MG tablet Take 1 tablet by mouth Every Night for 30 days. 30 tablet 0   • tetracycline (ACHROMYCIN,SUMYCIN) 500 MG capsule Take 500 mg by mouth 2 (Two) Times a Day.       No current facility-administered medications for this encounter.       Past Medical History:   Diagnosis Date   • Aortic valve stenosis     s/p tissue AVR   • Back pain    • CKD (chronic kidney disease)    • Colitis due to Clostridioides difficile 01/26/2022   • Diastolic dysfunction     Grade 2 per echocardiogram 2013   • Diverticulosis    • Exertional shortness of breath    • Heart disease    • Hiatal hernia    • Hyperlipidemia    • Hypertension    • Hyperthyroidism    • Hypertriglyceridemia 05/31/2018   • Hypothyroidism    • Left ventricular hypertrophy    • Legally blind    • Liver disease    • Macular degeneration    • Mitral regurgitation    • Osteoarthritis of hip    • Pancreatitis 01/26/2022   • Paroxysmal atrial fibrillation (HCC)    • Premature ventricular contractions    • Pulmonary hypertension (HCC)    • Renal insufficiency syndrome    • Type 2 diabetes mellitus (HCC)    • Uterine cancer (HCC)        Past Surgical History:   Procedure Laterality Date   • AORTIC VALVE REPAIR/REPLACEMENT     • CATARACT EXTRACTION      1970, 1999   • ENDOSCOPY  08/15/2014    no gross lesions in stomach/duodenum, erythrematous mucosa in stomach   • HYSTERECTOMY  2007   • STERNOTOMY         Physical Exam  Vitals and nursing note reviewed.   Constitutional:       General: She is not in acute distress.     Appearance: She is well-developed. She is obese. She is ill-appearing.   HENT:      Head: Normocephalic and atraumatic.   Eyes:      Conjunctiva/sclera: Conjunctivae normal.       "Pupils: Pupils are equal, round, and reactive to light.   Neck:      Vascular: No JVD.   Cardiovascular:      Rate and Rhythm: Normal rate and regular rhythm.      Heart sounds: Normal heart sounds. No murmur heard.    No friction rub. No gallop.   Pulmonary:      Effort: Pulmonary effort is normal. No respiratory distress.      Breath sounds: Normal breath sounds.   Abdominal:      General: Bowel sounds are normal. There is no distension.      Palpations: Abdomen is soft.   Musculoskeletal:         General: No swelling or deformity.   Skin:     General: Skin is warm and dry.      Capillary Refill: Capillary refill takes less than 2 seconds.   Neurological:      Mental Status: She is alert and oriented to person, place, and time. Mental status is at baseline.   Psychiatric:         Mood and Affect: Mood normal.         Behavior: Behavior normal.          Review of Systems   Constitutional: Positive for fatigue. Negative for unexpected weight change.   HENT: Negative for congestion and nosebleeds.    Eyes: Negative for photophobia and visual disturbance.   Respiratory: Positive for shortness of breath. Negative for cough and chest tightness.    Cardiovascular: Negative for chest pain, palpitations and leg swelling.   Gastrointestinal: Negative for abdominal distention and blood in stool.   Endocrine: Negative for polyphagia and polyuria.   Genitourinary: Positive for frequency. Negative for urgency.   Musculoskeletal: Negative for joint swelling and myalgias.   Skin: Negative for pallor and rash.   Neurological: Positive for weakness. Negative for dizziness, syncope, light-headedness, numbness and headaches.   Hematological: Does not bruise/bleed easily.   Psychiatric/Behavioral: Positive for sleep disturbance. Negative for confusion.        OBJECTIVE:  /84 (BP Location: Left arm, Patient Position: Sitting)   Pulse 62   Resp 20   Ht 144 cm (56.69\")   Wt 74.9 kg (165 lb 3.2 oz)   SpO2 97%   BMI 36.14 kg/m² "      Body mass index is 36.14 kg/m².  Wt Readings from Last 1 Encounters:   04/25/22 74.9 kg (165 lb 3.2 oz)       Lab Review:  Renal Function: Estimated Creatinine Clearance: 16.7 mL/min (A) (by C-G formula based on SCr of 1.98 mg/dL (C)).    Lab Results   Component Value Date    PROBNP 6,928.0 (H) 03/02/2022       Results for orders placed during the hospital encounter of 01/26/22    Adult Transthoracic Echo Complete W/ Cont if Necessary Per Protocol    Interpretation Summary  · Estimated left ventricular EF = 68% Left ventricular systolic function is normal.  · Left ventricular diastolic function is consistent with age.  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 The prosthetic aortic valve peak and mean gradients are elevated.  · Peak velocity of the flow distal to the aortic valve is 294.5 cm/s.  · Mild tricuspid valve regurgitation is present. Estimated right ventricular systolic pressure from tricuspid regurgitation is mildly elevated (35-45 mmHg). Calculated right ventricular systolic pressure from tricuspid regurgitation is 37.7 mmHg.      Procedures      6 MINUTE WALK                      Cardiac Procedures:  1. N/A       Previously trialed diuretics  Lasix      Previously trialed GDMT    Spironolactone  Losartan      ASSESSMENT:     Diagnosis Plan   1. Chronic heart failure with preserved ejection fraction (HCC)  Basic Metabolic Panel   2. Type 2 diabetes mellitus with stage 4 chronic kidney disease, with long-term current use of insulin (HCC)     3. Stage 4 chronic kidney disease (HCC)     4. Paroxysmal atrial fibrillation (HCC)     5. Pulmonary hypertension (HCC)     6. Essential hypertension           PLAN OF CARE:  1.  HFpEF-NYHA class III-IV.  Most recent ejection fraction 60% per echocardiogram.  Renal function precludes GDMT.    She is euvolemic on exam today on current therapy.  I would like to check labs as renal function and K levels were were on the rise last week.  She is to continue  following a low sodium diet of 2,000 mg daily or less, fluid restriction of 1,500 mL, and remain adherent with daily weights.  I would like to continue her current GDMT.    Directions for when to call the clinic reviewed with the patient to include weight gain of 2 to 3 pounds in 24 hours, weight gain of 5 to 10 pounds within 7 days; worsening shortness of breath; worsening lower extremity edema or abdominal distention.    2.  Limited mobility-patient is wheelchair-bound.  This limits her activity level considerably.    3.  Hypertension-stable and controlled.    4.  Hyperlipidemia-stable and controlled.     5.  Paroxysmal atrial fibrillation-rhythm regular on auscultation today, rate controlled.  Currently on Lopressor.    6.  DM2-stable and well controlled on insulin.    7.  CKD 4-followed by nephrology.  We will monitor and follow along with them.        BMP today; continue current GDMT; follow up in 6 weeks or sooner if needed        Advance Care Planning   Will discuss at subsequent visit      Thank you for allowing me to participate in the care of your patient,         RIGO Lowe  [facility] HEART FAILURE  04/25/22  14:47 EDT      **Leisa Disclaimer:**  Much of this encounter note is an electronic transcription/translation of spoken language to printed text. The electronic translation of spoken language may permit erroneous, or at times, nonsensical words or phrases to be inadvertently transcribed. Although I have reviewed the note for such errors, some may still exist.

## 2022-04-26 ENCOUNTER — TELEPHONE (OUTPATIENT)
Dept: CARDIOLOGY | Facility: HOSPITAL | Age: 87
End: 2022-04-26

## 2022-04-26 NOTE — TELEPHONE ENCOUNTER
Called Pt with lab results.  I spoke to her daughter Radha and let her know per Mirta that Pt's patient know that her kidney function is stable.  All questions and concerns addressed. Understanding and agreement verbalized.      Claudia Mason RN  [facility] Heart Failure Clinic

## 2022-04-26 NOTE — TELEPHONE ENCOUNTER
----- Message from RIGO Lowe sent at 4/26/2022  2:19 PM EDT -----  Please let the patient know that her kidney function is stable.  Plan of care per my office note.    ThanksMirta  ----- Message -----  From: Lab, Background User  Sent: 4/25/2022   5:39 PM EDT  To: [facility] Heart Fail Clinic Clinical Pool

## 2022-04-29 ENCOUNTER — TELEPHONE (OUTPATIENT)
Dept: ONCOLOGY | Facility: CLINIC | Age: 87
End: 2022-04-29

## 2022-04-29 NOTE — TELEPHONE ENCOUNTER
Phoned patient's nursing facility, [facility]. Spoke with Darrell, patient's nurse and was transferred to [facility] nursing supervisor. Gave verbal orders per Dr. Sherman to change patient's prednisone to 5 mg PO every other day. Orders r/v.

## 2022-05-04 ENCOUNTER — LAB (OUTPATIENT)
Dept: LAB | Facility: HOSPITAL | Age: 87
End: 2022-05-04

## 2022-05-04 ENCOUNTER — OFFICE VISIT (OUTPATIENT)
Dept: ONCOLOGY | Facility: CLINIC | Age: 87
End: 2022-05-04

## 2022-05-04 VITALS
OXYGEN SATURATION: 98 % | HEART RATE: 65 BPM | DIASTOLIC BLOOD PRESSURE: 72 MMHG | BODY MASS INDEX: 34.39 KG/M2 | WEIGHT: 159.4 LBS | RESPIRATION RATE: 16 BRPM | HEIGHT: 57 IN | TEMPERATURE: 97.3 F | SYSTOLIC BLOOD PRESSURE: 121 MMHG

## 2022-05-04 DIAGNOSIS — D69.6 THROMBOCYTOPENIA: ICD-10-CM

## 2022-05-04 DIAGNOSIS — Z79.899 HIGH RISK MEDICATION USE: ICD-10-CM

## 2022-05-04 DIAGNOSIS — D69.6 THROMBOCYTOPENIA: Primary | ICD-10-CM

## 2022-05-04 DIAGNOSIS — D69.3 ACUTE ITP: ICD-10-CM

## 2022-05-04 LAB
ALBUMIN SERPL-MCNC: 3.6 G/DL (ref 3.5–5.2)
ALBUMIN/GLOB SERPL: 1.6 G/DL (ref 1.1–2.4)
ALP SERPL-CCNC: 65 U/L (ref 38–116)
ALT SERPL W P-5'-P-CCNC: 22 U/L (ref 0–33)
ANION GAP SERPL CALCULATED.3IONS-SCNC: 16.7 MMOL/L (ref 5–15)
AST SERPL-CCNC: 23 U/L (ref 0–32)
BASOPHILS # BLD AUTO: 0.05 10*3/MM3 (ref 0–0.2)
BASOPHILS NFR BLD AUTO: 0.6 % (ref 0–1.5)
BILIRUB SERPL-MCNC: 0.5 MG/DL (ref 0.2–1.2)
BUN SERPL-MCNC: 72 MG/DL (ref 6–20)
BUN/CREAT SERPL: 25.5 (ref 7.3–30)
CALCIUM SPEC-SCNC: 9.2 MG/DL (ref 8.5–10.2)
CHLORIDE SERPL-SCNC: 97 MMOL/L (ref 98–107)
CO2 SERPL-SCNC: 22.3 MMOL/L (ref 22–29)
CREAT SERPL-MCNC: 2.82 MG/DL (ref 0.6–1.1)
DEPRECATED RDW RBC AUTO: 51.2 FL (ref 37–54)
EGFRCR SERPLBLD CKD-EPI 2021: 15.4 ML/MIN/1.73
EOSINOPHIL # BLD AUTO: 0.1 10*3/MM3 (ref 0–0.4)
EOSINOPHIL NFR BLD AUTO: 1.3 % (ref 0.3–6.2)
ERYTHROCYTE [DISTWIDTH] IN BLOOD BY AUTOMATED COUNT: 15.7 % (ref 12.3–15.4)
GLOBULIN UR ELPH-MCNC: 2.3 GM/DL (ref 1.8–3.5)
GLUCOSE SERPL-MCNC: 102 MG/DL (ref 74–124)
HCT VFR BLD AUTO: 40.6 % (ref 34–46.6)
HGB BLD-MCNC: 13.1 G/DL (ref 12–15.9)
IMM GRANULOCYTES # BLD AUTO: 0.04 10*3/MM3 (ref 0–0.05)
IMM GRANULOCYTES NFR BLD AUTO: 0.5 % (ref 0–0.5)
LYMPHOCYTES # BLD AUTO: 2.42 10*3/MM3 (ref 0.7–3.1)
LYMPHOCYTES NFR BLD AUTO: 30.3 % (ref 19.6–45.3)
MCH RBC QN AUTO: 29 PG (ref 26.6–33)
MCHC RBC AUTO-ENTMCNC: 32.3 G/DL (ref 31.5–35.7)
MCV RBC AUTO: 90 FL (ref 79–97)
MONOCYTES # BLD AUTO: 0.89 10*3/MM3 (ref 0.1–0.9)
MONOCYTES NFR BLD AUTO: 11.1 % (ref 5–12)
NEUTROPHILS NFR BLD AUTO: 4.5 10*3/MM3 (ref 1.7–7)
NEUTROPHILS NFR BLD AUTO: 56.2 % (ref 42.7–76)
NRBC BLD AUTO-RTO: 0 /100 WBC (ref 0–0.2)
PLATELET # BLD AUTO: 39 10*3/MM3 (ref 140–450)
PLATELETS.RETICULATED NFR BLD AUTO: 11 % (ref 0.9–6.5)
PMV BLD AUTO: 12.1 FL (ref 6–12)
POTASSIUM SERPL-SCNC: 3.7 MMOL/L (ref 3.5–4.7)
PROT SERPL-MCNC: 5.9 G/DL (ref 6.3–8)
RBC # BLD AUTO: 4.51 10*6/MM3 (ref 3.77–5.28)
SODIUM SERPL-SCNC: 136 MMOL/L (ref 134–145)
WBC NRBC COR # BLD: 8 10*3/MM3 (ref 3.4–10.8)

## 2022-05-04 PROCEDURE — 36415 COLL VENOUS BLD VENIPUNCTURE: CPT

## 2022-05-04 PROCEDURE — 85055 RETICULATED PLATELET ASSAY: CPT

## 2022-05-04 PROCEDURE — 80053 COMPREHEN METABOLIC PANEL: CPT

## 2022-05-04 PROCEDURE — 99214 OFFICE O/P EST MOD 30 MIN: CPT | Performed by: NURSE PRACTITIONER

## 2022-05-04 PROCEDURE — 85025 COMPLETE CBC W/AUTO DIFF WBC: CPT

## 2022-05-04 RX ORDER — ASCORBIC ACID 500 MG
500 TABLET ORAL 2 TIMES DAILY
COMMUNITY
End: 2022-06-09 | Stop reason: HOSPADM

## 2022-05-04 RX ORDER — PREDNISONE 20 MG/1
20 TABLET ORAL
COMMUNITY
End: 2022-05-18

## 2022-05-04 RX ORDER — ZINC SULFATE 50(220)MG
220 CAPSULE ORAL
COMMUNITY
End: 2022-05-18 | Stop reason: ALTCHOICE

## 2022-05-04 NOTE — PROGRESS NOTES
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  As of last Friday based on lab work provided per the rehab facility, we decreased her prednisone further down to 5 mg every other day.    Unfortunately as she returns today her platelet count has dropped to 39,000 with IPF up to 11%.  She denies any active bleeding but is noting worsening bruising.    Per discussion with patient and daughter, for now she is going to stay at the nursing facility with no immediate plans to go home.    They deny other new concerns today.    Objective   Vital Signs:   /72   Pulse 65   Temp 97.3 °F (36.3 °C) (Temporal)   Resp 16   Ht 144 cm (56.69\")   Wt 72.3 kg (159 lb 6.4 oz)   SpO2 98%   BMI 34.87 kg/m²     Physical Exam  Constitutional:       Appearance: She is well-developed.      Comments: Elderly female no distress seated in wheelchair   Eyes:      Conjunctiva/sclera: Conjunctivae normal.   Neck:      Thyroid: No thyromegaly.   Cardiovascular:      Rate and Rhythm: Normal rate and regular rhythm.      Heart sounds: Murmur heard.     No friction rub. No gallop.   Pulmonary:      Effort: No respiratory distress.      Breath sounds: Normal breath sounds.   Abdominal:      General: Bowel sounds are normal. There is no distension.      Palpations: Abdomen is soft.      Tenderness: There is no abdominal tenderness.   Musculoskeletal:         General: Swelling present.      Comments: Trace bilateral lower extremity edema   Skin:     General: Skin is warm and dry.      Findings: No rash.      Comments: There is a 1.5 cm diameter area of ulceration in the left heel which is fairly shallow, no evidence of infection.   Neurological:      Mental Status: She is alert and oriented to person, " place, and time.      Cranial Nerves: No cranial nerve deficit.      Motor: No abnormal muscle tone.      Deep Tendon Reflexes: Reflexes normal.   Psychiatric:         Behavior: Behavior normal.        Result Review :   Results from last 7 days   Lab Units 05/04/22  1413   WBC 10*3/mm3 8.00   NEUTROS ABS 10*3/mm3 4.50   HEMOGLOBIN g/dL 13.1   HEMATOCRIT % 40.6   PLATELETS 10*3/mm3 39*                  Assessment and Plan     *Thrombocytopenia  · Patient with apparent longstanding history of thrombocytopenia  · Previous CT scan with suggestion of chronic liver disease/cirrhotic liver morphology, last CT 4/15/2019 with normal spleen  · Platelet count 3/22/2019 was 52,000 and on 1/26/2022 was 60,000  · On admission 3/2/2022, platelet count 39,000  · Additional labs 3/3/2022 with IPF elevated at 12.2% indicative of peripheral destructive process and has remained elevated in the 10-12% range.  · Additional labs 3/3/2022 with positive BECCA at 1: 320 dilution with homogeneous pattern, negative BECCA panel  · On 3/4/2022, B12 low normal at 238, folate greater than 20, negative serum protein electrophoresis and immunoelectrophoresis with free kappa light chain 65.6, free lambda light chain 37.1 and free light chain ratio 1.77 (appropriate for degree of renal dysfunction), LDH borderline elevated 238  · CT abdomen and pelvis 3/8/2022 with liver morphology suspicious for chronic liver disease, spleen normal in size at 10.4 cm.  No other abnormalities.  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    · Unclear whether  improvement in thrombocytopenia was related to steroids, B12 replacement, or improvement in CHF/volume overload.  · Recommended decreasing prednisone dose down to 15 mg daily prior to transition to subacute nursing facility on 3/9/2022.  Patient however was discharged on prednisone 20 mg  daily.  Requested that nursing facility forward labs weekly monitor platelet count and further gradually taper prednisone dose however this did not occur.  · On 3/23/2022, platelet count was 64,000.  Tapered prednisone from 20 down to 15 mg daily.  Intend to maintain platelet count above the 50-33901 range.  Consider additional treatment options with IVIG or rituximab if platelet count declines into the 30,000 range or below consistently.  · 3/31/2022 platelet count 65,000 and on 4/4/2022 prednisone was tapered down to 10 mg daily  · On 4/20/2022, platelet count of 96,000 and prednisone tapered down to 5 mg daily.  · On 4/28/2022 based on platelet count of 73,000, orders given to decrease prednisone to 5 mg QOD.  · 5/4/2022 patient reviewed back today, unfortunately platelet count down to 39,000 IPF 11%.  She is noting more bruising but no active bleeding.  We will need to increase prednisone back up to 20 mg daily. Per discussion with Dr. Sherman we will also look into insurance approval for Rituxan in case we need this as our next line of treatment.    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     *Diabetes mellitus type 2  · Exacerbated by steroids     *Possible chronic liver disease  · Prior CTs with notation of cirrhotic liver morphology  · CT abdomen pelvis 4/15/2019 showed liver with a mildly shrunken appearance concerning for chronic liver disease.  Spleen however was normal in size.  · LFTs normal on 3/2/2022  · CT abdomen and pelvis 3/8/2022 with liver morphology suspicious for chronic liver disease, spleen normal in size at 10.4 cm.  No other abnormalities.  · Significance of the liver appearance on scans is unclear.  · LFTs have normalized    *GI prophylaxis  · Patient is currently receiving Pepcid 10 mg twice daily    *Pulmonary nodule  · Patient underwent CT scan at [facility] urology on 9/7/2021 that showed a 4 mm subpleural left lower lobe nodule  · Patient was scheduled for upcoming CT chest with thoracic surgery however declined to proceed with the scan.  Given her age and limited ability to treat a malignancy and with a high probability that this is a benign finding scan was canceled.    *Sacral and left heel ulcerations  · Patient has developed sacral and left heel ulcerations.  Left heel was debrided by wound care team at the nursing facility. Second shallow open area on the heel also being treated.     Plan:  1. Increase prednisone dose to 20 mg daily.  2. We will look into insurance approval of Rituxan for potential use in the near future.  3. Continue wound care to left heel and sacrum per nursing facility  4. Continue oral B12 replacement 1000 mcg daily  5. Continue Pepcid 10 mg twice daily for GI prophylaxis.  6. Return in 1 week for CBC with RN review and potentially to decrease prednisone dose again.  7. Return in 2 weeks for NP follow-up and possible further steroid taper.  8. Return in 4 weeks for MD follow-up and to discuss further plan of  care.    This patient is on high risk drug therapy requiring intensive monitoring for toxicity.

## 2022-05-11 ENCOUNTER — CLINICAL SUPPORT (OUTPATIENT)
Dept: ONCOLOGY | Facility: HOSPITAL | Age: 87
End: 2022-05-11

## 2022-05-11 ENCOUNTER — LAB (OUTPATIENT)
Dept: LAB | Facility: HOSPITAL | Age: 87
End: 2022-05-11

## 2022-05-11 DIAGNOSIS — N28.9 RENAL DYSFUNCTION: ICD-10-CM

## 2022-05-11 DIAGNOSIS — Z79.899 HIGH RISK MEDICATION USE: Primary | ICD-10-CM

## 2022-05-11 DIAGNOSIS — D69.6 THROMBOCYTOPENIA: ICD-10-CM

## 2022-05-11 LAB
ANION GAP SERPL CALCULATED.3IONS-SCNC: 14.8 MMOL/L (ref 5–15)
BASOPHILS # BLD AUTO: 0.02 10*3/MM3 (ref 0–0.2)
BASOPHILS NFR BLD AUTO: 0.2 % (ref 0–1.5)
BUN SERPL-MCNC: 41 MG/DL (ref 6–20)
BUN/CREAT SERPL: 22.9 (ref 7.3–30)
CALCIUM SPEC-SCNC: 9.7 MG/DL (ref 8.5–10.2)
CHLORIDE SERPL-SCNC: 100 MMOL/L (ref 98–107)
CO2 SERPL-SCNC: 22.2 MMOL/L (ref 22–29)
CREAT SERPL-MCNC: 1.79 MG/DL (ref 0.6–1.1)
DEPRECATED RDW RBC AUTO: 52.5 FL (ref 37–54)
EGFRCR SERPLBLD CKD-EPI 2021: 26.5 ML/MIN/1.73
EOSINOPHIL # BLD AUTO: 0.05 10*3/MM3 (ref 0–0.4)
EOSINOPHIL NFR BLD AUTO: 0.5 % (ref 0.3–6.2)
ERYTHROCYTE [DISTWIDTH] IN BLOOD BY AUTOMATED COUNT: 15.5 % (ref 12.3–15.4)
GLUCOSE SERPL-MCNC: 130 MG/DL (ref 74–124)
HCT VFR BLD AUTO: 38.9 % (ref 34–46.6)
HGB BLD-MCNC: 12.1 G/DL (ref 12–15.9)
IMM GRANULOCYTES # BLD AUTO: 0.03 10*3/MM3 (ref 0–0.05)
IMM GRANULOCYTES NFR BLD AUTO: 0.3 % (ref 0–0.5)
LYMPHOCYTES # BLD AUTO: 2.27 10*3/MM3 (ref 0.7–3.1)
LYMPHOCYTES NFR BLD AUTO: 23 % (ref 19.6–45.3)
MCH RBC QN AUTO: 28.9 PG (ref 26.6–33)
MCHC RBC AUTO-ENTMCNC: 31.1 G/DL (ref 31.5–35.7)
MCV RBC AUTO: 92.8 FL (ref 79–97)
MONOCYTES # BLD AUTO: 0.79 10*3/MM3 (ref 0.1–0.9)
MONOCYTES NFR BLD AUTO: 8 % (ref 5–12)
NEUTROPHILS NFR BLD AUTO: 6.7 10*3/MM3 (ref 1.7–7)
NEUTROPHILS NFR BLD AUTO: 68 % (ref 42.7–76)
NRBC BLD AUTO-RTO: 0 /100 WBC (ref 0–0.2)
PLATELET # BLD AUTO: 71 10*3/MM3 (ref 140–450)
PLATELETS.RETICULATED NFR BLD AUTO: 7.9 % (ref 0.9–6.5)
PMV BLD AUTO: 11.5 FL (ref 6–12)
POTASSIUM SERPL-SCNC: 3.9 MMOL/L (ref 3.5–4.7)
RBC # BLD AUTO: 4.19 10*6/MM3 (ref 3.77–5.28)
SODIUM SERPL-SCNC: 137 MMOL/L (ref 134–145)
WBC NRBC COR # BLD: 9.86 10*3/MM3 (ref 3.4–10.8)

## 2022-05-11 PROCEDURE — 85055 RETICULATED PLATELET ASSAY: CPT

## 2022-05-11 PROCEDURE — G0463 HOSPITAL OUTPT CLINIC VISIT: HCPCS

## 2022-05-11 PROCEDURE — 85025 COMPLETE CBC W/AUTO DIFF WBC: CPT

## 2022-05-11 PROCEDURE — 36415 COLL VENOUS BLD VENIPUNCTURE: CPT

## 2022-05-11 PROCEDURE — 80048 BASIC METABOLIC PNL TOTAL CA: CPT | Performed by: NURSE PRACTITIONER

## 2022-05-11 RX ORDER — HYDRALAZINE HYDROCHLORIDE 25 MG/1
25 TABLET, FILM COATED ORAL 3 TIMES DAILY
COMMUNITY
End: 2022-05-18

## 2022-05-11 RX ORDER — AMMONIUM LACTATE 120 MG/G
1 CREAM TOPICAL EVERY 12 HOURS
COMMUNITY
End: 2022-06-09 | Stop reason: HOSPADM

## 2022-05-11 RX ORDER — FAMOTIDINE 10 MG
10 TABLET ORAL 2 TIMES DAILY
COMMUNITY
End: 2022-12-25 | Stop reason: HOSPADM

## 2022-05-11 NOTE — PROGRESS NOTES
Pt present with daughter for visit. Pt reports doing well and voices no concerns other than bruising to her upper extremities. Denies active bleeding. Plt improved since last week. Pt confirms taking prednisone 20 mg daily. Message sent to Olga SIERRA to advise. Copy of labs provided. Pt v/u.    Lab Results   Component Value Date    WBC 9.86 05/11/2022    HGB 12.1 05/11/2022    HCT 38.9 05/11/2022    MCV 92.8 05/11/2022    PLT 71 (L) 05/11/2022     Reviewed labs with Olga SIERRA. V/o to have pt taper prednisone to 15 mg daily. Called and s/w pts daughter. Called pts nursing facility [facility] and s/w Carly WALKER.    Lab Results   Component Value Date    GLUCOSE 130 (H) 05/11/2022    CALCIUM 9.7 05/11/2022     05/11/2022    K 3.9 05/11/2022    CO2 22.2 05/11/2022     05/11/2022    BUN 41 (H) 05/11/2022    CREATININE 1.79 (H) 05/11/2022    EGFRIFAFRI 51 (L) 03/22/2019    EGFRIFNONA 19 (L) 02/25/2022    BCR 22.9 05/11/2022    ANIONGAP 14.8 05/11/2022

## 2022-05-12 ENCOUNTER — TELEPHONE (OUTPATIENT)
Dept: CARDIOLOGY | Facility: HOSPITAL | Age: 87
End: 2022-05-12

## 2022-05-12 NOTE — TELEPHONE ENCOUNTER
Pt's daughter Radha called.  She  is concerned because Pt's nephrologist stopped Pt's Lasix.  Pt is currently in a rehab facility. Daughter told me she called Dr. Wick's office (nephrology) to see why Pt's lasix was stopped, but no one has called her back yet.  Radha is concerned that stopping the Lasix will cause Pt to start putting on fluid and end up in the hospital.  I told her that I do not see a reason documented in her chart for why it was discontinued.  However, I can see some of her labs, and it was likely stopped due to her elevated creatinine.  Per Radha, Pt has been having some issues with low platelets and has had several rounds of prednisone.   I told her that I will let Mirta know of her concerns and call her back with any recommendations. It may be that the Lasix is only being stopped temporarilly and will be resumed once her kidney function has improved.   All questions and concerns addressed. Understanding and agreement verbalized.      Claudia Mason RN  [facility] Heart Failure Clinic

## 2022-05-13 NOTE — TELEPHONE ENCOUNTER
Called Pt's daughter Radha  back to let her know that I let RIGO Kirby  know about her concerns and advised  her as per Mirta's  note.  Radha said she is very upset and feels like none of Pt's doctors are communicating.  She wants to know what good does it do to come here if another doctor is going to stop a medicine that we started her on.  I told her I  understand that she is frustrated.  I am calling her back to let her know that  Mirta is aware of her concernsHer mom has several medical issues going on right now.  Managing her heart failure can be walking a thin line because the heart and kidneys work together.  It may seem to her that her Mom's providers are not communicating, but sometimes medications have to be stopped and restarted or changed based on Pt's symptoms, labs, etc.  Once her mom gets out of rehab, we can get her here in clinic for follow-up and reassess her HF treatment plan. All questions and concerns addressed. Understanding and agreement verbalized.      Claudia Mason RN  [facility] Heart Failure Clinic

## 2022-05-18 ENCOUNTER — OFFICE VISIT (OUTPATIENT)
Dept: CARDIOLOGY | Facility: CLINIC | Age: 87
End: 2022-05-18

## 2022-05-18 VITALS
WEIGHT: 160 LBS | SYSTOLIC BLOOD PRESSURE: 138 MMHG | DIASTOLIC BLOOD PRESSURE: 60 MMHG | HEART RATE: 65 BPM | BODY MASS INDEX: 34.52 KG/M2 | HEIGHT: 57 IN

## 2022-05-18 DIAGNOSIS — I10 ESSENTIAL HYPERTENSION: ICD-10-CM

## 2022-05-18 DIAGNOSIS — I48.0 PAROXYSMAL ATRIAL FIBRILLATION: ICD-10-CM

## 2022-05-18 DIAGNOSIS — I36.1 NONRHEUMATIC TRICUSPID VALVE REGURGITATION: ICD-10-CM

## 2022-05-18 DIAGNOSIS — I49.3 PVC (PREMATURE VENTRICULAR CONTRACTION): ICD-10-CM

## 2022-05-18 DIAGNOSIS — I35.0 NONRHEUMATIC AORTIC VALVE STENOSIS: ICD-10-CM

## 2022-05-18 DIAGNOSIS — I34.0 NONRHEUMATIC MITRAL VALVE REGURGITATION: ICD-10-CM

## 2022-05-18 DIAGNOSIS — I50.32 CHRONIC HEART FAILURE WITH PRESERVED EJECTION FRACTION: Primary | ICD-10-CM

## 2022-05-18 PROCEDURE — 93000 ELECTROCARDIOGRAM COMPLETE: CPT | Performed by: INTERNAL MEDICINE

## 2022-05-18 PROCEDURE — 99214 OFFICE O/P EST MOD 30 MIN: CPT | Performed by: INTERNAL MEDICINE

## 2022-05-18 NOTE — PROGRESS NOTES
Date of Office Visit: 2022  Encounter Provider: Beth Butcher MD  Place of Service: [facility] CARDIOLOGY  Patient Name: Helena Carmen  :[id number]      Patient ID:  Helena Carmen is a 89 y.o. female is here for  followup for         History of Present Illness    She was originally seen for a pericardial effusion noted on a CT scan of the abdomen and  pelvis. On her initial exam there was a murmur, and she complained of dyspnea. She had a  PET stress study performed in 2010 which showed no ischemia. She had an echocardiogram  the same day which showed an ejection fraction of 67%, mild concentric left ventricular  hypertrophy, grade IA diastolic dysfunction, moderate to marked left atrial enlargement,  moderate aortic stenosis with a peak gradient of 42 mmHg and a mean gradient of 24 mmHg.   There was mild mitral and tricuspid insufficiency, with a small pericardial effusion. Her  carotid duplex study also performed on that day was normal as well.      She had increasing fatigue and dyspnea and was able to do very little, which she thought was due to her back pain.   She had a 2-D echocardiogram with Doppler in 2013. Her ejection   fraction was 58%. There was severe aortic stenosis with valvular area of 0.6 mm cubed, a   peak gradient of 71 with a mean of 41 mmHg. There was also moderate mitral insufficiency, severe   left atrial enlargement, right ventricular systolic pressure of 45 mmHg and grade II diastolic   dysfunction. She had a normal carotid duplex in 2013.     She went to [facility] after having a cardiac catheterization done. Her cardiac  catheterization showed minimal coronary artery disease. Dr. Murry saw her in the valve  clinic and agreed that she needed aortic valvular replacement. Dr. Lagunas saw her and  was in agreement as well. On 2013, she had a 21 mm [employer] Mitch Trifecta bovine  pericardial valve placed. Postoperatively, she had renal  insufficiency and atrial  fibrillation which have gotten better. She had some encephalopathy postoperatively but  went to [facility] and did rehabilitation and did quite well.     She had PVCs. We did check a Holter monitor and there was no evidence of atrial fibrillation there so we  were able to stop her warfarin.      She was in the hospital with C. Difficile colitis from 07/30/2013 to 08/09/2013. She also had klebsiella urinary tract infection  and was treated with Levaquin. She was in acute renal failure. She had some pancreatitis  at that time as well. During her hospitalization we did see  her and her cardiac status was stable. She had a 2-D echocardiogram with Doppler  performed on 08/01/2013 which revealed an ejection fraction of 51%, mild-to-moderate left  ventricular hypertrophy, grade II diastolic dysfunction, moderate-to-severe left atrial  enlargement, mild-to-moderate mitral insufficiency, mild tricuspid insufficiency, and a  tissue-type aortic valve which was functioning quite well without stenosis or  insufficiency.     She is sedentary due to her blindness.      Echocardiogram was completed 6/7/2019 with the following results: EF 56%, grade 2 diastolic dysfunction, mild LVH, left atrium moderately to severely dilated, moderate mitral valve insufficiency, moderate tricuspid regurgitation, RVSP elevated at 69 mmHg, and normal functioning prosthetic aortic valve.      In December 2021, she followed up in the office.  She reported new onset dyspnea with exertion over 2 months.  Her blood pressure was also elevated in the office.  CMP showed an elevated BUN of 30, creatinine 1.55, proBNP of 4366, and TSH was normal.  Increased her spironolactone to 25 mg daily and Lasix 40 mg daily. In January 2022, echocardiogram showed LVEF 68%, diastolic function consistent with age, bioprosthetic aortic valve present, peak and mean gradients elevated, and mild tricuspid regurgitation.    She received 1 dose of IV Lasix  in clinic on 2/23/2022 and oral Lasix was restarted at 40 mg twice daily.  Nephrology, of note, stopped losartan, increased hydralazine, and decreased Lasix on 2/28/2022.     Admission from 3/2/2022 to 3/9/2022 was noted with acute on chronic exacerbation of her diastolic heart failure and acute kidney injury.  She has since been treated with Lasix 40 mg every other day and daily spironolactone per nephrology recommendation.     She is currently at the [facility].  Labs on 5/10/2022 show glucose 23, creatinine 1.6, otherwise unremarkable BMP with hemoglobin 11.4, platelets 44, otherwise unremarkable CBC.  Repeat laboratory values in 5/18/2022 showed creatinine 1.8.  Repeat CBC on 5/16/2022 showed platelets 68.    She sees Mirta Fenton next month.  She follows with Dr. Sherman for her thrombocytopenia.  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  Her platelets seem to maintain as long she is on prednisone.  Her Lasix was stopped by Dr. Art Wick.  She has no heart racing or skipping.  She does not sleep well because of a noisy neighbor.  She has no orthopnea or PND.  Her breathing is stable.  She has no chest pain or pressure.  She has no nausea or vomiting.  Her appetite is good.  Her weight has been stable.    Past Medical History:   Diagnosis Date   • Aortic valve stenosis     s/p tissue AVR   • Back pain    • CKD (chronic kidney disease)    • Colitis due to Clostridioides difficile 01/26/2022   • Diastolic dysfunction     Grade 2 per echocardiogram 2013   • Diverticulosis    • Exertional shortness of breath    • Heart disease    • Hiatal hernia    • Hyperlipidemia    • Hypertension    • Hyperthyroidism    • Hypertriglyceridemia 05/31/2018   • Hypothyroidism    • Left ventricular hypertrophy    • Legally blind    • Liver disease    • Macular degeneration    • Mitral regurgitation    • Osteoarthritis of hip    • Pancreatitis 01/26/2022   • Paroxysmal atrial fibrillation (HCC)    • Premature ventricular  contractions    • Pulmonary hypertension (HCC)    • Renal insufficiency syndrome    • Type 2 diabetes mellitus (HCC)    • Uterine cancer (HCC)          Past Surgical History:   Procedure Laterality Date   • AORTIC VALVE REPAIR/REPLACEMENT     • CATARACT EXTRACTION      1970, 1999   • ENDOSCOPY  08/15/2014    no gross lesions in stomach/duodenum, erythrematous mucosa in stomach   • HYSTERECTOMY  2007   • STERNOTOMY         Current Outpatient Medications on File Prior to Visit   Medication Sig Dispense Refill   • acetaminophen (TYLENOL) 500 MG tablet Take 500 mg by mouth Every 6 (Six) Hours As Needed for Mild Pain .     • amLODIPine (NORVASC) 10 MG tablet Take 1 tablet by mouth daily.     • ammonium lactate (AMLACTIN) 12 % cream Apply 1 application topically to the appropriate area as directed As Needed for Dry Skin.     • ascorbic acid (VITAMIN C) 500 MG tablet Take 500 mg by mouth.     • bisacodyl (DULCOLAX) 10 MG suppository Insert 10 mg into the rectum.     • Cyanocobalamin (VITAMIN B 12 PO) Take  by mouth.     • famotidine (PEPCID) 10 MG tablet Take 10 mg by mouth 2 (Two) Times a Day.     • furosemide (LASIX) 40 MG tablet Take 40 mg by mouth Every Other Day.     • gabapentin (NEURONTIN) 300 MG capsule Take 300 mg by mouth 2 (Two) Times a Day.     • hydrALAZINE (APRESOLINE) 25 MG tablet Take 1 tablet by mouth Every 8 (Eight) Hours for 30 days. 90 tablet 0   • hydrALAZINE (APRESOLINE) 25 MG tablet Take 25 mg by mouth 3 (Three) Times a Day.     • insulin glargine (LANTUS, SEMGLEE) 100 UNIT/ML injection Inject 22 Units under the skin into the appropriate area as directed Daily.     • Insulin Lispro (ADMELOG SOLOSTAR SC) Inject  under the skin into the appropriate area as directed.     • insulin lispro (humaLOG) 100 UNIT/ML injection Inject  under the skin into the appropriate area as directed.     • isosorbide mononitrate (IMDUR) 30 MG 24 hr tablet Take 1 tablet by mouth Daily for 30 days. 30 tablet 0   •  levothyroxine (SYNTHROID, LEVOTHROID) 25 MCG tablet Take 1 tablet by mouth daily.     • linagliptin (TRADJENTA) 5 MG tablet tablet Take 5 mg by mouth daily.     • LORazepam (ATIVAN) 0.5 MG tablet Take 0.5 mg by mouth.     • magnesium hydroxide (MILK OF MAGNESIA) 400 MG/5ML suspension Take  by mouth Daily As Needed for Constipation.     • metoprolol tartrate (LOPRESSOR) 25 MG tablet Take 12.5 mg by mouth 2 (Two) Times a Day.     • montelukast (SINGULAIR) 10 MG tablet Take 1 tablet by mouth daily.     • polyethylene glycol (MIRALAX) 17 GM/SCOOP powder Take 17 g by mouth.     • predniSONE (DELTASONE) 10 MG tablet Take 20 mg by mouth Daily.     • predniSONE (DELTASONE) 20 MG tablet Take 20 mg by mouth.     • rosuvastatin (CRESTOR) 5 MG tablet Take 1 tablet by mouth Every Night for 30 days. 30 tablet 0   • rosuvastatin (CRESTOR) 5 MG tablet Take 1 tablet by mouth Daily.     • sodium bicarbonate 650 MG tablet Take 650 mg by mouth 3 (Three) Times a Day.     • spironolactone (ALDACTONE) 25 MG tablet Take 0.5 tablets by mouth Daily for 30 days. 15 tablet 0   • tamsulosin (FLOMAX) 0.4 MG capsule 24 hr capsule Take 0.4 mg by mouth.     • tetracycline (ACHROMYCIN,SUMYCIN) 500 MG capsule Take 500 mg by mouth 2 (Two) Times a Day.     • zinc sulfate (ZINCATE) 220 (50 Zn) MG capsule Take 220 mg by mouth.       No current facility-administered medications on file prior to visit.       Social History     Socioeconomic History   • Marital status:    Tobacco Use   • Smoking status: Former Smoker     Packs/day: 0.50     Quit date: 7/10/1969     Years since quittin.8   • Smokeless tobacco: Never Used   Substance and Sexual Activity   • Alcohol use: No     Comment: caffeine use - coffee 2 cups daily   • Drug use: No           ROS    Procedures    ECG 12 Lead    Date/Time: 2022 1:40 PM  Performed by: Beth Butcher MD  Authorized by: Beth Butcher MD   Comparison: compared with previous ECG   Comparison to  "previous ECG: Now lateral st abnormality  Rhythm: sinus rhythm  T inversion: II, III, aVF, V5 and V6    Clinical impression: abnormal EKG                Objective:      Vitals:    05/18/22 1339   BP: 138/60   Pulse: 65   Weight: 72.6 kg (160 lb)   Height: 144 cm (56.69\")     Body mass index is 35 kg/m².    Vitals reviewed.   Constitutional:       General: Not in acute distress.     Appearance: Well-developed. Not diaphoretic.   Eyes:      General: No scleral icterus.     Conjunctiva/sclera: Conjunctivae normal.   HENT:      Head: Normocephalic and atraumatic.   Neck:      Thyroid: No thyromegaly.      Vascular: No carotid bruit or JVD.      Lymphadenopathy: No cervical adenopathy.   Pulmonary:      Effort: Pulmonary effort is normal. No respiratory distress.      Breath sounds: Normal breath sounds. No wheezing. No rhonchi. No rales.   Chest:      Chest wall: Not tender to palpatation.   Cardiovascular:      Normal rate. Regular rhythm.      Murmurs: There is no murmur.      No gallop.   Pulses:     Intact distal pulses.   Edema:     Peripheral edema absent.   Abdominal:      General: Bowel sounds are normal. There is no distension or abdominal bruit.      Palpations: Abdomen is soft. There is no abdominal mass.      Tenderness: There is no abdominal tenderness.   Musculoskeletal:         General: No deformity.      Extremities: No clubbing present.     Cervical back: Neck supple. Skin:     General: Skin is warm and dry. There is no cyanosis.      Coloration: Skin is not pale.      Findings: No rash.   Neurological:      Mental Status: Alert and oriented to person, place, and time.      Cranial Nerves: No cranial nerve deficit.   Psychiatric:         Judgment: Judgment normal.         Lab Review:       Assessment:      Diagnosis Plan   1. Chronic heart failure with preserved ejection fraction (HCC)     2. Essential hypertension     3. Nonrheumatic aortic valve stenosis     4. Paroxysmal atrial fibrillation (HCC)   "   5. PVC (premature ventricular contraction)     6. Nonrheumatic mitral valve regurgitation     7. Nonrheumatic tricuspid valve regurgitation       1. Severe aortic stenosis, status post AVR [employer] Mitch Trifecta bovine   pericardial valve, 21 mm on 06/08/2013. Doing well.   2.  HFpEF, chronic with history of acute exacerbations.  3. Small hiatal hernia. Followed by Dr. Freddy Martel.  4. Diverticular disease. Followed by Dr. Freddy Martel.  5. Diabetes mellitus, type 2. Followed by Dr. Hickman.   6. Grade II diastolic dysfunction.  7. Hypothyroidism.  8. 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  9. Hyperlipidemia. The patient is on Zocor.  10. Family history of cardiovascular disease.  12. Ventricular bigeminy. Stable.   13. HTN, BP controlled.   14.  CKD, stable but possibly made worse at times by uncontrolled hypertension.  15.  Thrombocytopenia.      Plan:       She is currently euvolemic so we will have her remain off of Lasix at this time.  Per my MA, she has a rash under her right breast likely would benefit from nystatin cream or powder.  Her blood pressure is good.  No changes to current medications.  We will like her to be seen about every 6 weeks.  We will have her follow-up with my nurse practitioner in 6 weeks and hopefully after that see Mirta Fenton in 6 weeks and then myself 6 weeks after Mirta.  No other testing needed at this time.

## 2022-05-19 ENCOUNTER — OFFICE VISIT (OUTPATIENT)
Dept: ONCOLOGY | Facility: CLINIC | Age: 87
End: 2022-05-19

## 2022-05-19 ENCOUNTER — LAB (OUTPATIENT)
Dept: LAB | Facility: HOSPITAL | Age: 87
End: 2022-05-19

## 2022-05-19 VITALS
BODY MASS INDEX: 35 KG/M2 | TEMPERATURE: 96.6 F | HEIGHT: 57 IN | DIASTOLIC BLOOD PRESSURE: 67 MMHG | RESPIRATION RATE: 16 BRPM | OXYGEN SATURATION: 96 % | SYSTOLIC BLOOD PRESSURE: 114 MMHG | HEART RATE: 97 BPM

## 2022-05-19 DIAGNOSIS — D69.6 THROMBOCYTOPENIA: Primary | ICD-10-CM

## 2022-05-19 DIAGNOSIS — D69.3 CHRONIC ITP (IDIOPATHIC THROMBOCYTOPENIA): ICD-10-CM

## 2022-05-19 DIAGNOSIS — D69.6 THROMBOCYTOPENIA: ICD-10-CM

## 2022-05-19 LAB
ALBUMIN SERPL-MCNC: 3.8 G/DL (ref 3.5–5.2)
ALBUMIN/GLOB SERPL: 1.3 G/DL (ref 1.1–2.4)
ALP SERPL-CCNC: 65 U/L (ref 38–116)
ALT SERPL W P-5'-P-CCNC: 25 U/L (ref 0–33)
ANION GAP SERPL CALCULATED.3IONS-SCNC: 13.5 MMOL/L (ref 5–15)
AST SERPL-CCNC: 20 U/L (ref 0–32)
BASOPHILS # BLD AUTO: 0.02 10*3/MM3 (ref 0–0.2)
BASOPHILS NFR BLD AUTO: 0.2 % (ref 0–1.5)
BILIRUB SERPL-MCNC: 0.5 MG/DL (ref 0.2–1.2)
BUN SERPL-MCNC: 37 MG/DL (ref 6–20)
BUN/CREAT SERPL: 21 (ref 7.3–30)
CALCIUM SPEC-SCNC: 10 MG/DL (ref 8.5–10.2)
CHLORIDE SERPL-SCNC: 105 MMOL/L (ref 98–107)
CO2 SERPL-SCNC: 19.5 MMOL/L (ref 22–29)
CREAT SERPL-MCNC: 1.76 MG/DL (ref 0.6–1.1)
DEPRECATED RDW RBC AUTO: 50.4 FL (ref 37–54)
EGFRCR SERPLBLD CKD-EPI 2021: 27 ML/MIN/1.73
EOSINOPHIL # BLD AUTO: 0.04 10*3/MM3 (ref 0–0.4)
EOSINOPHIL NFR BLD AUTO: 0.4 % (ref 0.3–6.2)
ERYTHROCYTE [DISTWIDTH] IN BLOOD BY AUTOMATED COUNT: 15.3 % (ref 12.3–15.4)
GLOBULIN UR ELPH-MCNC: 3 GM/DL (ref 1.8–3.5)
GLUCOSE SERPL-MCNC: 110 MG/DL (ref 74–124)
HCT VFR BLD AUTO: 38.4 % (ref 34–46.6)
HGB BLD-MCNC: 12.4 G/DL (ref 12–15.9)
IMM GRANULOCYTES # BLD AUTO: 0.08 10*3/MM3 (ref 0–0.05)
IMM GRANULOCYTES NFR BLD AUTO: 0.8 % (ref 0–0.5)
LYMPHOCYTES # BLD AUTO: 2 10*3/MM3 (ref 0.7–3.1)
LYMPHOCYTES NFR BLD AUTO: 19.8 % (ref 19.6–45.3)
MCH RBC QN AUTO: 29.3 PG (ref 26.6–33)
MCHC RBC AUTO-ENTMCNC: 32.3 G/DL (ref 31.5–35.7)
MCV RBC AUTO: 90.8 FL (ref 79–97)
MONOCYTES # BLD AUTO: 0.94 10*3/MM3 (ref 0.1–0.9)
MONOCYTES NFR BLD AUTO: 9.3 % (ref 5–12)
NEUTROPHILS NFR BLD AUTO: 69.5 % (ref 42.7–76)
NEUTROPHILS NFR BLD AUTO: 7.02 10*3/MM3 (ref 1.7–7)
NRBC BLD AUTO-RTO: 0 /100 WBC (ref 0–0.2)
PLATELET # BLD AUTO: 72 10*3/MM3 (ref 140–450)
PLATELETS.RETICULATED NFR BLD AUTO: 8.7 % (ref 0.9–6.5)
PMV BLD AUTO: 11 FL (ref 6–12)
POTASSIUM SERPL-SCNC: 4.2 MMOL/L (ref 3.5–4.7)
PROT SERPL-MCNC: 6.8 G/DL (ref 6.3–8)
RBC # BLD AUTO: 4.23 10*6/MM3 (ref 3.77–5.28)
SODIUM SERPL-SCNC: 138 MMOL/L (ref 134–145)
WBC NRBC COR # BLD: 10.1 10*3/MM3 (ref 3.4–10.8)

## 2022-05-19 PROCEDURE — 85025 COMPLETE CBC W/AUTO DIFF WBC: CPT

## 2022-05-19 PROCEDURE — 36415 COLL VENOUS BLD VENIPUNCTURE: CPT

## 2022-05-19 PROCEDURE — 85055 RETICULATED PLATELET ASSAY: CPT

## 2022-05-19 PROCEDURE — 99214 OFFICE O/P EST MOD 30 MIN: CPT | Performed by: NURSE PRACTITIONER

## 2022-05-19 PROCEDURE — 80053 COMPREHEN METABOLIC PANEL: CPT

## 2022-05-19 NOTE — PROGRESS NOTES
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  Patient had tapered down on prednisone to as low as 5 mg every other day.  Unfortunately when seen 5/4/2022 platelets had dropped to 39,000 with IPF up to 11%.  We increased her prednisone back to 20 mg daily.      Last week she returned for lab review of platelets improved to 71,000.  We tapered her to 15 mg daily.    She is now seen back with her daughter again and platelets remained stable at 72,000.  We discussed slow taper again down to 10 mg daily with plans to probably stay at this dose for a while.    The patient continues to reside at [facility].  It is noted that they can check blood work there and the patient and daughter would both prefer that she have labs done there and fax those to our office rather than her having to go back and forth to our facility.  This is reasonable.    Review of the notes including nephrology and cardiology, patient is currently off Lasix.  She is thankfully not retaining fluid.  It appears Lasix was stopped after lab work 2 weeks ago showed BUN up to 72 and creatinine up to 2.8.  Repeat labs are pending today however last week BUN was better at 41, creatinine 1.7.    They deny other concerns at this time.    Objective   Vital Signs:   /67   Pulse 97   Temp 96.6 °F (35.9 °C) (Temporal)   Resp 16   Ht 144 cm (56.69\")   SpO2 96%   BMI 35.00 kg/m²     Physical Exam  Constitutional:       Appearance: She is well-developed.      Comments: Elderly female no distress seated in wheelchair   Eyes:      Conjunctiva/sclera: Conjunctivae normal.   Neck:      Thyroid: No thyromegaly.   Cardiovascular:      Rate and Rhythm: Normal rate and regular rhythm.      Heart sounds: Murmur " heard.     No friction rub. No gallop.   Pulmonary:      Effort: No respiratory distress.      Breath sounds: Normal breath sounds.   Abdominal:      General: Bowel sounds are normal. There is no distension.      Palpations: Abdomen is soft.      Tenderness: There is no abdominal tenderness.   Musculoskeletal:         General: Swelling present.      Comments: Trace bilateral lower extremity edema   Skin:     General: Skin is warm and dry.      Findings: No rash.   Neurological:      Mental Status: She is alert and oriented to person, place, and time.      Cranial Nerves: No cranial nerve deficit.      Motor: No abnormal muscle tone.      Deep Tendon Reflexes: Reflexes normal.   Psychiatric:         Behavior: Behavior normal.        Result Review :   Results from last 7 days   Lab Units 05/19/22  1349   WBC 10*3/mm3 10.10   NEUTROS ABS 10*3/mm3 7.02*   HEMOGLOBIN g/dL 12.4   HEMATOCRIT % 38.4   PLATELETS 10*3/mm3 72*   IPF = 8.7%               Assessment and Plan     *Thrombocytopenia  · Patient with apparent longstanding history of thrombocytopenia  · Previous CT scan with suggestion of chronic liver disease/cirrhotic liver morphology, last CT 4/15/2019 with normal spleen  · Platelet count 3/22/2019 was 52,000 and on 1/26/2022 was 60,000  · On admission 3/2/2022, platelet count 39,000  · Additional labs 3/3/2022 with IPF elevated at 12.2% indicative of peripheral destructive process and has remained elevated in the 10-12% range.  · Additional labs 3/3/2022 with positive BECCA at 1: 320 dilution with homogeneous pattern, negative BECCA panel  · On 3/4/2022, B12 low normal at 238, folate greater than 20, negative serum protein electrophoresis and immunoelectrophoresis with free kappa light chain 65.6, free lambda light chain 37.1 and free light chain ratio 1.77 (appropriate for degree of renal dysfunction), LDH borderline elevated 238  · CT abdomen and pelvis 3/8/2022 with liver morphology suspicious for chronic liver  disease, spleen normal in size at 10.4 cm.  No other abnormalities.  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    · Unclear whether  improvement in thrombocytopenia was related to steroids, B12 replacement, or improvement in CHF/volume overload.  · Recommended decreasing prednisone dose down to 15 mg daily prior to transition to subacute nursing facility on 3/9/2022.  Patient however was discharged on prednisone 20 mg daily.  Requested that nursing facility forward labs weekly monitor platelet count and further gradually taper prednisone dose however this did not occur.  · On 3/23/2022, platelet count was 64,000.  Tapered prednisone from 20 down to 15 mg daily.  Intend to maintain platelet count above the 50-29578 range.  Consider additional treatment options with IVIG or rituximab if platelet count declines into the 30,000 range or below consistently.  · 3/31/2022 platelet count 65,000 and on 4/4/2022 prednisone was tapered down to 10 mg daily  · On 4/20/2022, platelet count of 96,000 and prednisone tapered down to 5 mg daily.  · On 4/28/2022 based on platelet count of 73,000, orders given to decrease prednisone to 5 mg QOD.  · 5/4/2022 platelets 39,000 IPF 11%.  Increase prednisone back up to 20 mg daily.  · 5/11/2022 platelets 72,000, IPF 7.9%.  Prednisone drop to 15 mg daily.  · 5/19/2022 platelets stable at 71,000, IPF 8.7%.  We will drop prednisone further to 10 mg daily and stay at this dose for the time being.    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  Lasix stopped per nephrology.  · 5/11/2022 BUN improved to 41, creatinine 1.7.     *Acute on chronic diastolic CHF  · History of severe aortic stenosis status post aortic valve replacement in June 2013  · Patient admitted with progressive generalized weakness and dyspnea on exertion  · 5/4/2022 renal function worsening with creatinine up to 2.8, BUN 72.  Lasix stopped per nephrology.  · Patient with just trace lower extremity edema with no significant worsening fluid accumulation thankfully.     *Diabetes mellitus type 2  · Exacerbated by steroids     *Possible chronic liver disease  · Prior CTs with notation of cirrhotic liver morphology  · CT abdomen pelvis 4/15/2019 showed liver with a mildly shrunken appearance concerning for chronic liver disease.  Spleen however was normal in size.  · LFTs normal on 3/2/2022  · CT abdomen and pelvis 3/8/2022 with liver morphology suspicious for chronic liver disease, spleen normal in size at 10.4 cm.  No other abnormalities.  · Significance of the liver appearance on scans is unclear.  · LFTs have normalized    *GI prophylaxis  · Patient is currently receiving Pepcid 10 mg twice daily    *Pulmonary nodule  · Patient underwent CT scan at [facility] urology on 9/7/2021 that showed a 4 mm subpleural left lower lobe nodule  · Patient was scheduled for upcoming CT chest with thoracic surgery however declined to proceed with the scan.  Given her age and limited ability to treat a malignancy and with a high probability that this is a benign finding scan was  canceled.    *Sacral and left heel ulcerations  · Patient has developed sacral and left heel ulcerations.  Left heel was debrided by wound care team at the nursing facility. Second shallow open area on the heel also being treated.     Plan:  1. Further taper prednisone to 10 mg daily.    2. Patient will have repeat CBC done at [facility] in 1 week with results to be faxed.  Based on results we will determine whether we can further taper prednisone.  Per Dr. Sherman we need to very slowly taper at this point considering previous drop in platelet count.    3. Per review with our precert team, patient would be approved for Rituxan if platelets drop below 30,000.    4. Patient will otherwise return in 3 weeks for follow-up with Dr. Sherman with CBC, IPF, CMP and further review.  5. Continue oral B12 replacement 1000 mcg daily  6. Continue Pepcid 10 mg twice daily for GI prophylaxis.

## 2022-05-20 ENCOUNTER — TELEPHONE (OUTPATIENT)
Dept: ONCOLOGY | Facility: CLINIC | Age: 87
End: 2022-05-20

## 2022-05-20 NOTE — TELEPHONE ENCOUNTER
Phoned patient's nursing facility, spoke with nurse Laird. Gave verbal orders per Dr. Sherman, to change patient's prednisone dose to 10mg daily, and to draw a CBC next week with results called and faxed to our office. Nurse Laird r/v orders.

## 2022-06-05 ENCOUNTER — HOSPITAL ENCOUNTER (INPATIENT)
Facility: HOSPITAL | Age: 87
LOS: 4 days | Discharge: NURSING, REHAB OR LONG-TERM CARE | End: 2022-06-09
Attending: EMERGENCY MEDICINE | Admitting: HOSPITALIST

## 2022-06-05 ENCOUNTER — APPOINTMENT (OUTPATIENT)
Dept: GENERAL RADIOLOGY | Facility: HOSPITAL | Age: 87
End: 2022-06-05

## 2022-06-05 DIAGNOSIS — I48.0 PAROXYSMAL ATRIAL FIBRILLATION: ICD-10-CM

## 2022-06-05 DIAGNOSIS — A41.9 SEPSIS, DUE TO UNSPECIFIED ORGANISM, UNSPECIFIED WHETHER ACUTE ORGAN DYSFUNCTION PRESENT: Primary | ICD-10-CM

## 2022-06-05 DIAGNOSIS — N39.0 ACUTE UTI: ICD-10-CM

## 2022-06-05 LAB
ALBUMIN SERPL-MCNC: 3.8 G/DL (ref 3.5–5.2)
ALBUMIN/GLOB SERPL: 1.5 G/DL
ALP SERPL-CCNC: 68 U/L (ref 39–117)
ALT SERPL W P-5'-P-CCNC: 24 U/L (ref 1–33)
ANION GAP SERPL CALCULATED.3IONS-SCNC: 13 MMOL/L (ref 5–15)
AST SERPL-CCNC: 23 U/L (ref 1–32)
B PARAPERT DNA SPEC QL NAA+PROBE: NOT DETECTED
B PERT DNA SPEC QL NAA+PROBE: NOT DETECTED
BACTERIA UR QL AUTO: ABNORMAL /HPF
BASOPHILS # BLD AUTO: 0.02 10*3/MM3 (ref 0–0.2)
BASOPHILS NFR BLD AUTO: 0.1 % (ref 0–1.5)
BILIRUB SERPL-MCNC: 0.7 MG/DL (ref 0–1.2)
BILIRUB UR QL STRIP: NEGATIVE
BUN SERPL-MCNC: 22 MG/DL (ref 8–23)
BUN/CREAT SERPL: 12.7 (ref 7–25)
C PNEUM DNA NPH QL NAA+NON-PROBE: NOT DETECTED
CALCIUM SPEC-SCNC: 9.3 MG/DL (ref 8.2–9.6)
CHLORIDE SERPL-SCNC: 98 MMOL/L (ref 98–107)
CLARITY UR: CLEAR
CO2 SERPL-SCNC: 24 MMOL/L (ref 22–29)
COLOR UR: YELLOW
CREAT SERPL-MCNC: 1.73 MG/DL (ref 0.57–1)
D-LACTATE SERPL-SCNC: 1.7 MMOL/L (ref 0.5–2)
D-LACTATE SERPL-SCNC: 2.5 MMOL/L (ref 0.5–2)
DEPRECATED RDW RBC AUTO: 47.1 FL (ref 37–54)
EGFRCR SERPLBLD CKD-EPI 2021: 27.6 ML/MIN/1.73
EOSINOPHIL # BLD AUTO: 0.05 10*3/MM3 (ref 0–0.4)
EOSINOPHIL NFR BLD AUTO: 0.3 % (ref 0.3–6.2)
ERYTHROCYTE [DISTWIDTH] IN BLOOD BY AUTOMATED COUNT: 14.7 % (ref 12.3–15.4)
FLUAV SUBTYP SPEC NAA+PROBE: NOT DETECTED
FLUBV RNA ISLT QL NAA+PROBE: NOT DETECTED
GLOBULIN UR ELPH-MCNC: 2.6 GM/DL
GLUCOSE BLDC GLUCOMTR-MCNC: 220 MG/DL (ref 70–130)
GLUCOSE BLDC GLUCOMTR-MCNC: 227 MG/DL (ref 70–130)
GLUCOSE SERPL-MCNC: 152 MG/DL (ref 65–99)
GLUCOSE UR STRIP-MCNC: NEGATIVE MG/DL
HADV DNA SPEC NAA+PROBE: NOT DETECTED
HCOV 229E RNA SPEC QL NAA+PROBE: NOT DETECTED
HCOV HKU1 RNA SPEC QL NAA+PROBE: NOT DETECTED
HCOV NL63 RNA SPEC QL NAA+PROBE: NOT DETECTED
HCOV OC43 RNA SPEC QL NAA+PROBE: NOT DETECTED
HCT VFR BLD AUTO: 36.3 % (ref 34–46.6)
HGB BLD-MCNC: 12 G/DL (ref 12–15.9)
HGB UR QL STRIP.AUTO: NEGATIVE
HMPV RNA NPH QL NAA+NON-PROBE: NOT DETECTED
HPIV1 RNA ISLT QL NAA+PROBE: NOT DETECTED
HPIV2 RNA SPEC QL NAA+PROBE: NOT DETECTED
HPIV3 RNA NPH QL NAA+PROBE: NOT DETECTED
HPIV4 P GENE NPH QL NAA+PROBE: NOT DETECTED
HYALINE CASTS UR QL AUTO: ABNORMAL /LPF
IMM GRANULOCYTES # BLD AUTO: 0.09 10*3/MM3 (ref 0–0.05)
IMM GRANULOCYTES NFR BLD AUTO: 0.6 % (ref 0–0.5)
KETONES UR QL STRIP: NEGATIVE
LEUKOCYTE ESTERASE UR QL STRIP.AUTO: ABNORMAL
LYMPHOCYTES # BLD AUTO: 0.81 10*3/MM3 (ref 0.7–3.1)
LYMPHOCYTES NFR BLD AUTO: 5.4 % (ref 19.6–45.3)
M PNEUMO IGG SER IA-ACNC: NOT DETECTED
MCH RBC QN AUTO: 29 PG (ref 26.6–33)
MCHC RBC AUTO-ENTMCNC: 33.1 G/DL (ref 31.5–35.7)
MCV RBC AUTO: 87.7 FL (ref 79–97)
MONOCYTES # BLD AUTO: 0.57 10*3/MM3 (ref 0.1–0.9)
MONOCYTES NFR BLD AUTO: 3.8 % (ref 5–12)
NEUTROPHILS NFR BLD AUTO: 13.39 10*3/MM3 (ref 1.7–7)
NEUTROPHILS NFR BLD AUTO: 89.8 % (ref 42.7–76)
NITRITE UR QL STRIP: POSITIVE
NRBC BLD AUTO-RTO: 0 /100 WBC (ref 0–0.2)
PH UR STRIP.AUTO: 6.5 [PH] (ref 5–8)
PLATELET # BLD AUTO: 74 10*3/MM3 (ref 140–450)
PMV BLD AUTO: 12.1 FL (ref 6–12)
POTASSIUM SERPL-SCNC: 4.4 MMOL/L (ref 3.5–5.2)
PROCALCITONIN SERPL-MCNC: 0.36 NG/ML (ref 0–0.25)
PROT SERPL-MCNC: 6.4 G/DL (ref 6–8.5)
PROT UR QL STRIP: ABNORMAL
QT INTERVAL: 326 MS
RBC # BLD AUTO: 4.14 10*6/MM3 (ref 3.77–5.28)
RBC # UR STRIP: ABNORMAL /HPF
REF LAB TEST METHOD: ABNORMAL
RHINOVIRUS RNA SPEC NAA+PROBE: NOT DETECTED
RSV RNA NPH QL NAA+NON-PROBE: NOT DETECTED
SARS-COV-2 RNA NPH QL NAA+NON-PROBE: NOT DETECTED
SODIUM SERPL-SCNC: 135 MMOL/L (ref 136–145)
SP GR UR STRIP: 1.01 (ref 1–1.03)
SQUAMOUS #/AREA URNS HPF: ABNORMAL /HPF
UROBILINOGEN UR QL STRIP: ABNORMAL
WBC # UR STRIP: ABNORMAL /HPF
WBC NRBC COR # BLD: 14.93 10*3/MM3 (ref 3.4–10.8)

## 2022-06-05 PROCEDURE — 83605 ASSAY OF LACTIC ACID: CPT | Performed by: EMERGENCY MEDICINE

## 2022-06-05 PROCEDURE — 84145 PROCALCITONIN (PCT): CPT | Performed by: EMERGENCY MEDICINE

## 2022-06-05 PROCEDURE — 87077 CULTURE AEROBIC IDENTIFY: CPT | Performed by: EMERGENCY MEDICINE

## 2022-06-05 PROCEDURE — 71045 X-RAY EXAM CHEST 1 VIEW: CPT

## 2022-06-05 PROCEDURE — 99284 EMERGENCY DEPT VISIT MOD MDM: CPT

## 2022-06-05 PROCEDURE — 25010000002 CEFTRIAXONE PER 250 MG: Performed by: EMERGENCY MEDICINE

## 2022-06-05 PROCEDURE — 87040 BLOOD CULTURE FOR BACTERIA: CPT | Performed by: EMERGENCY MEDICINE

## 2022-06-05 PROCEDURE — 82962 GLUCOSE BLOOD TEST: CPT

## 2022-06-05 PROCEDURE — 93005 ELECTROCARDIOGRAM TRACING: CPT | Performed by: EMERGENCY MEDICINE

## 2022-06-05 PROCEDURE — 85025 COMPLETE CBC W/AUTO DIFF WBC: CPT | Performed by: EMERGENCY MEDICINE

## 2022-06-05 PROCEDURE — 80053 COMPREHEN METABOLIC PANEL: CPT | Performed by: EMERGENCY MEDICINE

## 2022-06-05 PROCEDURE — 99285 EMERGENCY DEPT VISIT HI MDM: CPT

## 2022-06-05 PROCEDURE — 93010 ELECTROCARDIOGRAM REPORT: CPT | Performed by: INTERNAL MEDICINE

## 2022-06-05 PROCEDURE — 36415 COLL VENOUS BLD VENIPUNCTURE: CPT

## 2022-06-05 PROCEDURE — 0202U NFCT DS 22 TRGT SARS-COV-2: CPT | Performed by: EMERGENCY MEDICINE

## 2022-06-05 PROCEDURE — P9612 CATHETERIZE FOR URINE SPEC: HCPCS

## 2022-06-05 PROCEDURE — 81001 URINALYSIS AUTO W/SCOPE: CPT | Performed by: EMERGENCY MEDICINE

## 2022-06-05 PROCEDURE — 87086 URINE CULTURE/COLONY COUNT: CPT | Performed by: EMERGENCY MEDICINE

## 2022-06-05 PROCEDURE — 87186 SC STD MICRODIL/AGAR DIL: CPT | Performed by: EMERGENCY MEDICINE

## 2022-06-05 PROCEDURE — 63710000001 INSULIN LISPRO (HUMAN) PER 5 UNITS: Performed by: HOSPITALIST

## 2022-06-05 RX ORDER — INSULIN LISPRO 100 [IU]/ML
0-7 INJECTION, SOLUTION INTRAVENOUS; SUBCUTANEOUS
Status: DISCONTINUED | OUTPATIENT
Start: 2022-06-05 | End: 2022-06-05

## 2022-06-05 RX ORDER — AMLODIPINE BESYLATE 5 MG/1
5 TABLET ORAL DAILY
Status: DISCONTINUED | OUTPATIENT
Start: 2022-06-06 | End: 2022-06-08

## 2022-06-05 RX ORDER — LORAZEPAM 0.5 MG/1
0.5 TABLET ORAL EVERY 6 HOURS PRN
Status: DISCONTINUED | OUTPATIENT
Start: 2022-06-05 | End: 2022-06-06

## 2022-06-05 RX ORDER — MONTELUKAST SODIUM 10 MG/1
10 TABLET ORAL DAILY
Status: DISCONTINUED | OUTPATIENT
Start: 2022-06-05 | End: 2022-06-09 | Stop reason: HOSPADM

## 2022-06-05 RX ORDER — ROSUVASTATIN CALCIUM 5 MG/1
5 TABLET, COATED ORAL DAILY
Status: DISCONTINUED | OUTPATIENT
Start: 2022-06-05 | End: 2022-06-05

## 2022-06-05 RX ORDER — LEVOTHYROXINE SODIUM 0.03 MG/1
25 TABLET ORAL DAILY
Status: DISCONTINUED | OUTPATIENT
Start: 2022-06-05 | End: 2022-06-09 | Stop reason: HOSPADM

## 2022-06-05 RX ORDER — FAMOTIDINE 20 MG/1
20 TABLET, FILM COATED ORAL 2 TIMES DAILY
Status: DISCONTINUED | OUTPATIENT
Start: 2022-06-05 | End: 2022-06-09 | Stop reason: HOSPADM

## 2022-06-05 RX ORDER — FAMOTIDINE 20 MG/1
10 TABLET, FILM COATED ORAL 2 TIMES DAILY
Status: DISCONTINUED | OUTPATIENT
Start: 2022-06-05 | End: 2022-06-05

## 2022-06-05 RX ORDER — ROSUVASTATIN CALCIUM 5 MG/1
5 TABLET, COATED ORAL NIGHTLY
Status: DISCONTINUED | OUTPATIENT
Start: 2022-06-06 | End: 2022-06-09 | Stop reason: HOSPADM

## 2022-06-05 RX ORDER — ACETAMINOPHEN 500 MG
1000 TABLET ORAL ONCE
Status: DISCONTINUED | OUTPATIENT
Start: 2022-06-05 | End: 2022-06-05

## 2022-06-05 RX ORDER — AMLODIPINE BESYLATE 5 MG/1
10 TABLET ORAL DAILY
Status: DISCONTINUED | OUTPATIENT
Start: 2022-06-05 | End: 2022-06-05

## 2022-06-05 RX ORDER — TAMSULOSIN HYDROCHLORIDE 0.4 MG/1
0.4 CAPSULE ORAL DAILY
Status: DISCONTINUED | OUTPATIENT
Start: 2022-06-05 | End: 2022-06-09 | Stop reason: HOSPADM

## 2022-06-05 RX ORDER — INSULIN LISPRO 100 [IU]/ML
0-7 INJECTION, SOLUTION INTRAVENOUS; SUBCUTANEOUS
Status: DISCONTINUED | OUTPATIENT
Start: 2022-06-05 | End: 2022-06-09 | Stop reason: HOSPADM

## 2022-06-05 RX ORDER — ACETAMINOPHEN 500 MG
500 TABLET ORAL EVERY 6 HOURS PRN
Status: DISCONTINUED | OUTPATIENT
Start: 2022-06-05 | End: 2022-06-09

## 2022-06-05 RX ORDER — SODIUM BICARBONATE 650 MG/1
650 TABLET ORAL 3 TIMES DAILY
Status: DISCONTINUED | OUTPATIENT
Start: 2022-06-05 | End: 2022-06-05

## 2022-06-05 RX ORDER — GABAPENTIN 300 MG/1
300 CAPSULE ORAL 2 TIMES DAILY
Status: DISCONTINUED | OUTPATIENT
Start: 2022-06-05 | End: 2022-06-09 | Stop reason: HOSPADM

## 2022-06-05 RX ORDER — PREDNISONE 10 MG/1
15 TABLET ORAL DAILY
Status: DISCONTINUED | OUTPATIENT
Start: 2022-06-05 | End: 2022-06-05

## 2022-06-05 RX ORDER — INSULIN GLARGINE 100 [IU]/ML
22 INJECTION, SOLUTION SUBCUTANEOUS DAILY
Status: DISCONTINUED | OUTPATIENT
Start: 2022-06-05 | End: 2022-06-05

## 2022-06-05 RX ORDER — CHOLECALCIFEROL (VITAMIN D3) 125 MCG
1000 CAPSULE ORAL DAILY
Status: DISCONTINUED | OUTPATIENT
Start: 2022-06-05 | End: 2022-06-06

## 2022-06-05 RX ORDER — PREDNISONE 10 MG/1
10 TABLET ORAL DAILY
Status: DISCONTINUED | OUTPATIENT
Start: 2022-06-06 | End: 2022-06-06

## 2022-06-05 RX ORDER — INSULIN LISPRO 100 [IU]/ML
5 INJECTION, SOLUTION INTRAVENOUS; SUBCUTANEOUS
Status: DISCONTINUED | OUTPATIENT
Start: 2022-06-05 | End: 2022-06-07

## 2022-06-05 RX ORDER — ACETAMINOPHEN 500 MG
1000 TABLET ORAL ONCE
Status: COMPLETED | OUTPATIENT
Start: 2022-06-05 | End: 2022-06-05

## 2022-06-05 RX ADMIN — GABAPENTIN 300 MG: 300 CAPSULE ORAL at 20:49

## 2022-06-05 RX ADMIN — TAMSULOSIN HYDROCHLORIDE 0.4 MG: 0.4 CAPSULE ORAL at 20:49

## 2022-06-05 RX ADMIN — LEVOTHYROXINE SODIUM 25 MCG: 0.03 TABLET ORAL at 18:45

## 2022-06-05 RX ADMIN — CEFTRIAXONE SODIUM 1 G: 1 INJECTION, POWDER, FOR SOLUTION INTRAMUSCULAR; INTRAVENOUS at 13:16

## 2022-06-05 RX ADMIN — INSULIN LISPRO 3 UNITS: 100 INJECTION, SOLUTION INTRAVENOUS; SUBCUTANEOUS at 21:20

## 2022-06-05 RX ADMIN — ACETAMINOPHEN 500 MG: 500 TABLET ORAL at 20:57

## 2022-06-05 RX ADMIN — INSULIN LISPRO 5 UNITS: 100 INJECTION, SOLUTION INTRAVENOUS; SUBCUTANEOUS at 18:45

## 2022-06-05 RX ADMIN — Medication 1000 MCG: at 20:49

## 2022-06-05 RX ADMIN — INSULIN LISPRO 2 UNITS: 100 INJECTION, SOLUTION INTRAVENOUS; SUBCUTANEOUS at 18:46

## 2022-06-05 RX ADMIN — ACETAMINOPHEN 1000 MG: 500 TABLET ORAL at 13:02

## 2022-06-05 RX ADMIN — FAMOTIDINE 20 MG: 20 TABLET ORAL at 20:49

## 2022-06-05 RX ADMIN — SODIUM CHLORIDE 1365 ML: 9 INJECTION, SOLUTION INTRAVENOUS at 13:49

## 2022-06-05 RX ADMIN — METOPROLOL TARTRATE 5 MG: 1 INJECTION, SOLUTION INTRAVENOUS at 12:59

## 2022-06-05 NOTE — ED PROVIDER NOTES
EMERGENCY DEPARTMENT ENCOUNTER    Room Number:  35/35  PCP: Mariah Hickman MD  Historian: Patient  History Limited By: Nothing      HPI  Chief Complaint: Fever  Context: Helena Carmen is a 89 y.o. female who presents to the ED c/o fever.  Patient has a history of urinary tract infections and has indwelling catheter.  Patient states symptoms started about 4 to 5 days ago feeling poorly.  Patient had her catheter changed and had urine cultured.  Was started on Macrobid per family.  Patient states symptoms have not improved has had shaking chills.  Patient unaware that she had a fever until this morning.  Patient has had 1 episode of vomiting.  Has had no diarrhea.  Has had no chest pain or shortness of breath.  No abdominal pain.      Location: Fever  Radiation: None  Character: Acute onset  Duration: 4 to 5 days  Severity: Moderate  Progression: Waxes and wanes  Aggravating Factors: Nothing  Alleviating Factors: Nothing        MEDICAL RECORD REVIEW    Patient admitted in March for congestive heart failure          PAST MEDICAL HISTORY  Active Ambulatory Problems     Diagnosis Date Noted   • Aortic valve stenosis 04/05/2016   • Fatigue 04/05/2016   • MI (mitral incompetence) 04/05/2016   • PVC (premature ventricular contraction) 04/05/2016   • Legally blind 05/25/2018   • Essential hypertension 05/25/2018   • Hypertriglyceridemia 05/31/2018   • Pulmonary hypertension (HCC) 06/25/2020   • Paroxysmal atrial fibrillation (HCC) 06/25/2020   • Anxiety 07/10/2014   • Chronic kidney disease 03/20/2015   • Chronic pain disorder 01/26/2022   • Degeneration of lumbar intervertebral disc 09/09/2014   • Diabetes mellitus (HCC) 01/26/2022   • Esophageal reflux 07/10/2014   • Gastroparesis 01/26/2022   • Hiatal hernia 01/26/2022   • Iatrogenic hypothyroidism 07/08/2014   • Long term (current) use of opiate analgesic 01/26/2022   • Macular degeneration 01/26/2022   • Malignant neoplasm of uterus (HCC) 01/26/2022   •  Osteoarthritis of knee 07/10/2014   • Peripheral nerve disease 07/10/2014   • Secondary hyperparathyroidism (HCC) 04/05/2016   • Thrombocytopenia (HCC) 07/11/2014   • Chronic heart failure with preserved ejection fraction (HCC) 03/02/2022   • Other cirrhosis of liver (HCC) 03/03/2022   • Hypothyroidism 02/22/2022   • Nonrheumatic tricuspid valve regurgitation 02/22/2022     Resolved Ambulatory Problems     Diagnosis Date Noted   • Diastolic dysfunction 04/05/2016   • Hypertensive pulmonary vascular disease (HCC) 04/05/2016   • S/P AVR 05/10/2017   • Breast screening 07/08/2014   • Abdominal pain, right lower quadrant 12/13/2020   • Allergic rhinitis 07/10/2014   • Angina pectoris (HCC) 07/10/2014   • Pancreatitis 01/26/2022   • Colitis due to Clostridioides difficile 01/26/2022   • Hypothyroidism 01/26/2022   • Uncontrolled type 2 diabetes mellitus 07/08/2014   • Benign essential hypertension 07/08/2014   • Hypertension 01/26/2022   • Pulmonary hypertension (HCC) 04/05/2016   • Hypercholesterolemia 01/26/2022   • Hyperlipidemia 07/08/2014   • Legal blindness 05/25/2018   • Mitral valve disorder 07/10/2014   • Mitral valve regurgitation 04/05/2016   • Ventricular premature beats 04/05/2016   • History of aortic valve replacement 05/10/2017   • Nonrheumatic tricuspid valve regurgitation    • Elevated serum creatinine    • CHF (congestive heart failure) (HCC) 03/02/2022   • Hypertensive disorder 02/22/2022   • Pancreatitis 02/22/2022     Past Medical History:   Diagnosis Date   • Back pain    • CKD (chronic kidney disease)    • Diverticulosis    • Exertional shortness of breath    • Heart disease    • Hyperthyroidism    • Left ventricular hypertrophy    • Liver disease    • Mitral regurgitation    • Osteoarthritis of hip    • Premature ventricular contractions    • Renal insufficiency syndrome    • Type 2 diabetes mellitus (HCC)    • Uterine cancer (HCC)          PAST SURGICAL HISTORY  Past Surgical History:    Procedure Laterality Date   • AORTIC VALVE REPAIR/REPLACEMENT     • CATARACT EXTRACTION      ,    • ENDOSCOPY  08/15/2014    no gross lesions in stomach/duodenum, erythrematous mucosa in stomach   • HYSTERECTOMY     • STERNOTOMY           FAMILY HISTORY  Family History   Problem Relation Age of Onset   • Heart disease Mother    • Hypertension Mother    • Stroke Mother    • Diabetes Mother         mellitus   • Other Other         cardiovascular disorder         SOCIAL HISTORY  Social History     Socioeconomic History   • Marital status:    Tobacco Use   • Smoking status: Former Smoker     Packs/day: 0.50     Quit date: 7/10/1969     Years since quittin.9   • Smokeless tobacco: Never Used   Substance and Sexual Activity   • Alcohol use: No     Comment: caffeine use - coffee 2 cups daily   • Drug use: No         ALLERGIES  Cephalexin, Erythromycin, Penicillins, and Sulfa antibiotics        REVIEW OF SYSTEMS  Review of Systems   Constitutional: Positive for chills, fatigue and fever.   HENT: Negative for sore throat.    Eyes: Negative.    Respiratory: Negative for cough and shortness of breath.    Cardiovascular: Negative for chest pain.   Gastrointestinal: Positive for vomiting. Negative for abdominal pain and diarrhea.   Genitourinary: Negative for dysuria.   Musculoskeletal: Negative for neck pain.   Skin: Negative for rash.   Allergic/Immunologic: Negative.    Neurological: Positive for weakness. Negative for numbness and headaches.   Hematological: Negative.    Psychiatric/Behavioral: Negative.    All other systems reviewed and are negative.           PHYSICAL EXAM  ED Triage Vitals [22 1126]   Temp Heart Rate Resp BP SpO2   (!) 102.6 °F (39.2 °C) 84 18 171/73 95 %      Temp src Heart Rate Source Patient Position BP Location FiO2 (%)   Oral -- -- -- --       Physical Exam  Vitals and nursing note reviewed.   Constitutional:       General: She is not in acute distress.  HENT:       Head: Normocephalic and atraumatic.   Eyes:      Pupils: Pupils are equal, round, and reactive to light.   Cardiovascular:      Rate and Rhythm: Normal rate and regular rhythm.      Heart sounds: Normal heart sounds.   Pulmonary:      Effort: Pulmonary effort is normal. No respiratory distress.      Breath sounds: Normal breath sounds.   Abdominal:      Palpations: Abdomen is soft.      Tenderness: There is no abdominal tenderness. There is no guarding or rebound.   Musculoskeletal:         General: Normal range of motion.      Cervical back: Normal range of motion and neck supple.   Skin:     General: Skin is warm and dry.      Findings: No rash.   Neurological:      Mental Status: She is alert and oriented to person, place, and time.      Sensory: Sensation is intact. No sensory deficit.      Motor: No weakness.   Psychiatric:         Mood and Affect: Mood and affect normal.       Patient was wearing a face mask when I entered the room and they continued to wear a mask throughout their stay in the ED.  I wore PPE, including  gloves, face mask with shield or face mask with goggles whenever I was in the room with patient.       LAB RESULTS  Recent Results (from the past 24 hour(s))   Respiratory Panel PCR w/COVID-19(SARS-CoV-2) LATONIA/CHARLIE/ALEJANDRO/PAD/COR/MAD/PASQUALE In-House, NP Swab in [facility], 3-4 HR TAT - Swab, Nasopharynx    Collection Time: 06/05/22 12:06 PM    Specimen: Nasopharynx; Swab   Result Value Ref Range    ADENOVIRUS, PCR Not Detected Not Detected    Coronavirus 229E Not Detected Not Detected    Coronavirus HKU1 Not Detected Not Detected    Coronavirus NL63 Not Detected Not Detected    Coronavirus OC43 Not Detected Not Detected    COVID19 Not Detected Not Detected - Ref. Range    Human Metapneumovirus Not Detected Not Detected    Human Rhinovirus/Enterovirus Not Detected Not Detected    Influenza A PCR Not Detected Not Detected    Influenza B PCR Not Detected Not Detected    Parainfluenza Virus 1 Not Detected  Not Detected    Parainfluenza Virus 2 Not Detected Not Detected    Parainfluenza Virus 3 Not Detected Not Detected    Parainfluenza Virus 4 Not Detected Not Detected    RSV, PCR Not Detected Not Detected    Bordetella pertussis pcr Not Detected Not Detected    Bordetella parapertussis PCR Not Detected Not Detected    Chlamydophila pneumoniae PCR Not Detected Not Detected    Mycoplasma pneumo by PCR Not Detected Not Detected   ECG 12 Lead    Collection Time: 06/05/22 12:08 PM   Result Value Ref Range    QT Interval 326 ms   Urinalysis With Culture If Indicated - Urine, Catheter    Collection Time: 06/05/22 12:11 PM    Specimen: Urine, Catheter   Result Value Ref Range    Color, UA Yellow Yellow, Straw    Appearance, UA Clear Clear    pH, UA 6.5 5.0 - 8.0    Specific Gravity, UA 1.010 1.005 - 1.030    Glucose, UA Negative Negative    Ketones, UA Negative Negative    Bilirubin, UA Negative Negative    Blood, UA Negative Negative    Protein, UA 30 mg/dL (1+) (A) Negative    Leuk Esterase, UA Small (1+) (A) Negative    Nitrite, UA Positive (A) Negative    Urobilinogen, UA 0.2 E.U./dL 0.2 - 1.0 E.U./dL   Urinalysis, Microscopic Only - Urine, Catheter    Collection Time: 06/05/22 12:11 PM    Specimen: Urine, Catheter   Result Value Ref Range    RBC, UA 0-2 None Seen, 0-2 /HPF    WBC, UA 6-12 (A) None Seen, 0-2 /HPF    Bacteria, UA 3+ (A) None Seen /HPF    Squamous Epithelial Cells, UA 0-2 None Seen, 0-2 /HPF    Hyaline Casts, UA 0-2 None Seen /LPF    Methodology Automated Microscopy    Comprehensive Metabolic Panel    Collection Time: 06/05/22 12:18 PM    Specimen: Blood   Result Value Ref Range    Glucose 152 (H) 65 - 99 mg/dL    BUN 22 8 - 23 mg/dL    Creatinine 1.73 (H) 0.57 - 1.00 mg/dL    Sodium 135 (L) 136 - 145 mmol/L    Potassium 4.4 3.5 - 5.2 mmol/L    Chloride 98 98 - 107 mmol/L    CO2 24.0 22.0 - 29.0 mmol/L    Calcium 9.3 8.2 - 9.6 mg/dL    Total Protein 6.4 6.0 - 8.5 g/dL    Albumin 3.80 3.50 - 5.20 g/dL     ALT (SGPT) 24 1 - 33 U/L    AST (SGOT) 23 1 - 32 U/L    Alkaline Phosphatase 68 39 - 117 U/L    Total Bilirubin 0.7 0.0 - 1.2 mg/dL    Globulin 2.6 gm/dL    A/G Ratio 1.5 g/dL    BUN/Creatinine Ratio 12.7 7.0 - 25.0    Anion Gap 13.0 5.0 - 15.0 mmol/L    eGFR 27.6 (L) >60.0 mL/min/1.73   Lactic Acid, Plasma    Collection Time: 06/05/22 12:18 PM    Specimen: Blood   Result Value Ref Range    Lactate 2.5 (C) 0.5 - 2.0 mmol/L   Procalcitonin    Collection Time: 06/05/22 12:18 PM    Specimen: Blood   Result Value Ref Range    Procalcitonin 0.36 (H) 0.00 - 0.25 ng/mL   CBC Auto Differential    Collection Time: 06/05/22 12:18 PM    Specimen: Blood   Result Value Ref Range    WBC 14.93 (H) 3.40 - 10.80 10*3/mm3    RBC 4.14 3.77 - 5.28 10*6/mm3    Hemoglobin 12.0 12.0 - 15.9 g/dL    Hematocrit 36.3 34.0 - 46.6 %    MCV 87.7 79.0 - 97.0 fL    MCH 29.0 26.6 - 33.0 pg    MCHC 33.1 31.5 - 35.7 g/dL    RDW 14.7 12.3 - 15.4 %    RDW-SD 47.1 37.0 - 54.0 fl    MPV 12.1 (H) 6.0 - 12.0 fL    Platelets 74 (L) 140 - 450 10*3/mm3    Neutrophil % 89.8 (H) 42.7 - 76.0 %    Lymphocyte % 5.4 (L) 19.6 - 45.3 %    Monocyte % 3.8 (L) 5.0 - 12.0 %    Eosinophil % 0.3 0.3 - 6.2 %    Basophil % 0.1 0.0 - 1.5 %    Immature Grans % 0.6 (H) 0.0 - 0.5 %    Neutrophils, Absolute 13.39 (H) 1.70 - 7.00 10*3/mm3    Lymphocytes, Absolute 0.81 0.70 - 3.10 10*3/mm3    Monocytes, Absolute 0.57 0.10 - 0.90 10*3/mm3    Eosinophils, Absolute 0.05 0.00 - 0.40 10*3/mm3    Basophils, Absolute 0.02 0.00 - 0.20 10*3/mm3    Immature Grans, Absolute 0.09 (H) 0.00 - 0.05 10*3/mm3    nRBC 0.0 0.0 - 0.2 /100 WBC       Ordered the above labs and reviewed the results.        RADIOLOGY  XR Chest 1 View   Final Result   No focal pulmonary consolidation. Borderline heart size.   Follow-up as clinical indications persist.       This report was finalized on 6/5/2022 12:10 PM by Dr. Donald Hernandez M.D.               Ordered the above noted radiological studies. Reviewed by  me in PACS.        PROCEDURES  Procedures      EKG:          EKG time: 1208  Rhythm/Rate: Atrial fibrillation 136  P waves and CT: No P waves  QRS, axis: Normal QRS  ST and T waves: Nonspecific ST-T waves    Interpreted Contemporaneously by me, independently viewed  Changed compared to prior 3/2/2022        MEDICATIONS GIVEN IN ER  Medications   acetaminophen (TYLENOL) tablet 1,000 mg (1,000 mg Oral Given 6/5/22 1302)   metoprolol tartrate (LOPRESSOR) injection 5 mg (5 mg Intravenous Given 6/5/22 1259)   cefTRIAXone (ROCEPHIN) 1 g in sodium chloride 0.9 % 100 mL IVPB-VTB (0 g Intravenous Stopped 6/5/22 1348)   sodium chloride 0.9% - IBW for BMI > 30 bolus 1,365 mL (1,365 mL Intravenous New Bag 6/5/22 1349)             PROGRESS AND CONSULTS  ED Course as of 06/05/22 1453   Sun Jun 05, 2022   1352 13:52 EDT  Patient presents for fever.  Does have indwelling catheter and diagnosis of urinary tract infection.  Patient does have bacteria in urine.  Has been given Rocephin.  Has been given fluids for the lactic acid of 2.5.  Patient does have intermittent atrial fibrillation as well.  Patient discussed with Dr. Arnold and will be admitted. [SL]      ED Course User Index  [SL] Nishant Uribe MD           MEDICAL DECISION MAKING      MDM  Number of Diagnoses or Management Options     Amount and/or Complexity of Data Reviewed  Clinical lab tests: reviewed and ordered (Elevated white blood cell count procalcitonin and lactic acid)  Tests in the radiology section of CPT®: reviewed and ordered (Chest x-ray negative)  Decide to obtain previous medical records or to obtain history from someone other than the patient: yes  Discuss the patient with other providers: yes (Discussed with Dr. Arnold who will admit.)               DIAGNOSIS  Final diagnoses:   Sepsis, due to unspecified organism, unspecified whether acute organ dysfunction present (HCC)   Acute UTI   Paroxysmal atrial fibrillation (HCC)            DISPOSITION  admit        Latest Documented Vital Signs:  As of 14:53 EDT  BP- 139/62 HR- 115 Temp- (!) 102.5 °F (39.2 °C) (Tympanic) O2 sat- 97%                         Nishant Uribe MD  06/05/22 [id number]

## 2022-06-05 NOTE — H&P
History and physical    Primary care physician  Dr. Hickman    Chief complaint  Fever chills    History of present illness  89-year-old white female who is well-known to our service with history of atrial fibrillation aortic stenosis diabetes mellitus hypertension hyperlipidemia and chronic kidney disease stage IV who was recently treated for acute on chronic diastolic CHF currently at nursing home getting PT OT nursing care who also have chronic Hoffman catheter presented to [facility] with fever chills started this morning.  Patient has been feeling poorly for last few days.  Patient denies any chest pain increase shortness of breath abdominal pain diarrhea but does have nausea vomiting.  Patient work-up in ER revealed acute UTI with sepsis admit for management.  Patient is DNR per her wishes.     PAST MEDICAL HISTORY  • Back pain     • CKD (chronic kidney disease)     • Diverticulosis     • Exertional shortness of breath     • Heart disease     • Hyperthyroidism     • Left ventricular hypertrophy     • Liver disease     • Mitral regurgitation     • Osteoarthritis of hip     • Premature ventricular contractions     • Renal insufficiency syndrome     • Type 2 diabetes mellitus (HCC)     • Uterine cancer (HCC)        PAST SURGICAL HISTORY        Procedure Laterality Date   • AORTIC VALVE REPAIR/REPLACEMENT       • CATARACT EXTRACTION         1970, 1999   • ENDOSCOPY   08/15/2014     no gross lesions in stomach/duodenum, erythrematous mucosa in stomach   • HYSTERECTOMY   2007   • STERNOTOMY             FAMILY HISTORY           Problem Relation Age of Onset   • Heart disease Mother     • Hypertension Mother     • Stroke Mother     • Diabetes Mother           mellitus   • Other Other           cardiovascular disorder      SOCIAL HISTORY                 Socioeconomic History   • Marital status:    Tobacco Use   • Smoking status: Former Smoker       Packs/day: 0.50       Quit date: 7/10/1969       Years  "since quittin.9   • Smokeless tobacco: Never Used   Substance and Sexual Activity   • Alcohol use: No       Comment: caffeine use - coffee 2 cups daily   • Drug use: No         ALLERGIES  Cephalexin, Erythromycin, Penicillins, and Sulfa antibiotics  Nursing home medications reviewed    REVIEW OF SYSTEMS  Constitutional: Positive for chills, fatigue and fever.   HENT: Negative for sore throat.    Eyes: Negative.    Respiratory: Negative for cough and shortness of breath.    Cardiovascular: Negative for chest pain.   Gastrointestinal: Positive for vomiting. Negative for abdominal pain and diarrhea.   Genitourinary: Negative for dysuria.   Musculoskeletal: Negative for neck pain.   Skin: Negative for rash.   Allergic/Immunologic: Negative.    Neurological: Positive for weakness. Negative for numbness and headaches.   Hematological: Negative.    Psychiatric/Behavioral: Negative.    All other systems reviewed and are negative.      PHYSICAL EXAM  Blood pressure 119/57, pulse 69, temperature 99.8 °F (37.7 °C), temperature source Tympanic, resp. rate 18, height 144.8 cm (57\"), weight 73.9 kg (163 lb), SpO2 95 %.    Constitutional:       General: She is not in acute distress.  HENT:      Head: Normocephalic and atraumatic.   Eyes:      Pupils: Pupils are equal, round, and reactive to light.   Cardiovascular:      Rate and Rhythm: Normal rate and regular rhythm.      Heart sounds: Normal heart sounds.   Pulmonary:      Effort: Pulmonary effort is normal. No respiratory distress.      Breath sounds: Normal breath sounds.   Abdominal:      Palpations: Abdomen is soft.      Tenderness: There is no abdominal tenderness. There is no guarding or rebound.   Musculoskeletal:         General: Normal range of motion.      Cervical back: Normal range of motion and neck supple.   Skin:     General: Skin is warm and dry.      Findings: No rash.   Neurological:      Mental Status: She is alert and oriented to person, place, and time. "      Sensory: Sensation is intact. No sensory deficit.      Motor: No weakness.   Psychiatric:         Mood and Affect: Mood and affect normal.      LAB RESULTS  Lab Results (last 24 hours)     Procedure Component Value Units Date/Time    STAT Lactic Acid, Reflex [[id number]]  (Normal) Collected: 06/05/22 1517    Specimen: Blood Updated: 06/05/22 1554     Lactate 1.7 mmol/L     Respiratory Panel PCR w/COVID-19(SARS-CoV-2) LATONIA/CHARLIE/ALEJANDRO/PAD/COR/MAD/PASQUALE In-House, NP Swab in UTM/VTM, 3-4 HR TAT - Swab, Nasopharynx [[id number]]  (Normal) Collected: 06/05/22 1206    Specimen: Swab from Nasopharynx Updated: 06/05/22 1321     ADENOVIRUS, PCR Not Detected     Coronavirus 229E Not Detected     Coronavirus HKU1 Not Detected     Coronavirus NL63 Not Detected     Coronavirus OC43 Not Detected     COVID19 Not Detected     Human Metapneumovirus Not Detected     Human Rhinovirus/Enterovirus Not Detected     Influenza A PCR Not Detected     Influenza B PCR Not Detected     Parainfluenza Virus 1 Not Detected     Parainfluenza Virus 2 Not Detected     Parainfluenza Virus 3 Not Detected     Parainfluenza Virus 4 Not Detected     RSV, PCR Not Detected     Bordetella pertussis pcr Not Detected     Bordetella parapertussis PCR Not Detected     Chlamydophila pneumoniae PCR Not Detected     Mycoplasma pneumo by PCR Not Detected    Narrative:      In the setting of a positive respiratory panel with a viral infection PLUS a negative procalcitonin without other underlying concern for bacterial infection, consider observing off antibiotics or discontinuation of antibiotics and continue supportive care. If the respiratory panel is positive for atypical bacterial infection (Bordetella pertussis, Chlamydophila pneumoniae, or Mycoplasma pneumoniae), consider antibiotic de-escalation to target atypical bacterial infection.    Blood Culture - Blood, Arm, Left [[id number]] Collected: 06/05/22 1311    Specimen: Blood from Arm, Left Updated: 06/05/22 1314  "   Lactic Acid, Plasma [[id number]]  (Abnormal) Collected: 06/05/22 1218    Specimen: Blood Updated: 06/05/22 1302     Lactate 2.5 mmol/L     Procalcitonin [[id number]]  (Abnormal) Collected: 06/05/22 1218    Specimen: Blood Updated: 06/05/22 1258     Procalcitonin 0.36 ng/mL     Narrative:      As a Marker for Sepsis (Non-Neonates):    1. <0.5 ng/mL represents a low risk of severe sepsis and/or septic shock.  2. >2 ng/mL represents a high risk of severe sepsis and/or septic shock.    As a Marker for Lower Respiratory Tract Infections that require antibiotic therapy:    PCT on Admission    Antibiotic Therapy       6-12 Hrs later    >0.5                Strongly Recommended  >0.25 - <0.5        Recommended   0.1 - 0.25          Discouraged              Remeasure/reassess PCT  <0.1                Strongly Discouraged     Remeasure/reassess PCT    As 28 day mortality risk marker: \"Change in Procalcitonin Result\" (>80% or <=80%) if Day 0 (or Day 1) and Day 4 values are available. Refer to http://www.[employer]-pct-calculator.com    Change in PCT <=80%  A decrease of PCT levels below or equal to 80% defines a positive change in PCT test result representing a higher risk for 28-day all-cause mortality of patients diagnosed with severe sepsis for septic shock.    Change in PCT >80%  A decrease of PCT levels of more than 80% defines a negative change in PCT result representing a lower risk for 28-day all-cause mortality of patients diagnosed with severe sepsis or septic shock.       Comprehensive Metabolic Panel [[id number]]  (Abnormal) Collected: 06/05/22 1218    Specimen: Blood Updated: 06/05/22 1252     Glucose 152 mg/dL      BUN 22 mg/dL      Creatinine 1.73 mg/dL      Sodium 135 mmol/L      Potassium 4.4 mmol/L      Chloride 98 mmol/L      CO2 24.0 mmol/L      Calcium 9.3 mg/dL      Total Protein 6.4 g/dL      Albumin 3.80 g/dL      ALT (SGPT) 24 U/L      AST (SGOT) 23 U/L      Alkaline Phosphatase 68 U/L      Total Bilirubin " 0.7 mg/dL      Globulin 2.6 gm/dL      A/G Ratio 1.5 g/dL      BUN/Creatinine Ratio 12.7     Anion Gap 13.0 mmol/L      eGFR 27.6 mL/min/1.73      Comment: National Kidney Foundation and American Society of Nephrology (ASN) Task Force recommended calculation based on the Chronic Kidney Disease Epidemiology Collaboration (CKD-EPI) equation refit without adjustment for race.       Narrative:      GFR Normal >60  Chronic Kidney Disease <60  Kidney Failure <15      Urinalysis, Microscopic Only - Urine, Catheter [[id number]]  (Abnormal) Collected: 06/05/22 1211    Specimen: Urine, Catheter Updated: 06/05/22 1238     RBC, UA 0-2 /HPF      WBC, UA 6-12 /HPF      Bacteria, UA 3+ /HPF      Squamous Epithelial Cells, UA 0-2 /HPF      Hyaline Casts, UA 0-2 /LPF      Methodology Automated Microscopy    Urine Culture - Urine, Urine, Catheter [[id number]] Collected: 06/05/22 1211    Specimen: Urine, Catheter Updated: 06/05/22 1238    Urinalysis With Culture If Indicated - Urine, Catheter [[id number]]  (Abnormal) Collected: 06/05/22 1211    Specimen: Urine, Catheter Updated: 06/05/22 1237     Color, UA Yellow     Appearance, UA Clear     pH, UA 6.5     Specific Gravity, UA 1.010     Glucose, UA Negative     Ketones, UA Negative     Bilirubin, UA Negative     Blood, UA Negative     Protein, UA 30 mg/dL (1+)     Leuk Esterase, UA Small (1+)     Nitrite, UA Positive     Urobilinogen, UA 0.2 E.U./dL    Narrative:      In absence of clinical symptoms, the presence of pyuria, bacteria, and/or nitrites on the urinalysis result does not correlate with infection.    CBC & Differential [[id number]]  (Abnormal) Collected: 06/05/22 1218    Specimen: Blood Updated: 06/05/22 1236    Narrative:      The following orders were created for panel order CBC & Differential.  Procedure                               Abnormality         Status                     ---------                               -----------         ------                     CBC Auto  Differential[[id number]]        Abnormal            Final result                 Please view results for these tests on the individual orders.    CBC Auto Differential [[id number]]  (Abnormal) Collected: 06/05/22 1218    Specimen: Blood Updated: 06/05/22 1236     WBC 14.93 10*3/mm3      RBC 4.14 10*6/mm3      Hemoglobin 12.0 g/dL      Hematocrit 36.3 %      MCV 87.7 fL      MCH 29.0 pg      MCHC 33.1 g/dL      RDW 14.7 %      RDW-SD 47.1 fl      MPV 12.1 fL      Platelets 74 10*3/mm3      Neutrophil % 89.8 %      Lymphocyte % 5.4 %      Monocyte % 3.8 %      Eosinophil % 0.3 %      Basophil % 0.1 %      Immature Grans % 0.6 %      Neutrophils, Absolute 13.39 10*3/mm3      Lymphocytes, Absolute 0.81 10*3/mm3      Monocytes, Absolute 0.57 10*3/mm3      Eosinophils, Absolute 0.05 10*3/mm3      Basophils, Absolute 0.02 10*3/mm3      Immature Grans, Absolute 0.09 10*3/mm3      nRBC 0.0 /100 WBC     Blood Culture - Blood, Arm, Left [[id number]] Collected: 06/05/22 1218    Specimen: Blood from Arm, Left Updated: 06/05/22 1225        Imaging Results (Last 24 Hours)     Procedure Component Value Units Date/Time    XR Chest 1 View [[id number]] Collected: 06/05/22 1209     Updated: 06/05/22 1213    Narrative:      XR CHEST 1 VW-     HISTORY: Female who is 89 years-old,  fever     TECHNIQUE: Frontal view of the chest     COMPARISON: 3/6/2022     FINDINGS: The heart size is borderline. Aorta is calcified. Sternotomy  wires are present. No focal pulmonary consolidation, pleural effusion,  or pneumothorax. No acute osseous process.       Impression:      No focal pulmonary consolidation. Borderline heart size.  Follow-up as clinical indications persist.     This report was finalized on 6/5/2022 12:10 PM by Dr. Donald Hernandez M.D.                ECG 12 Lead  Component   Ref Range & Units 12:08 3 mo ago   QT Interval   ms 326 P  449    Resulting Agency [facility] ECG  ECG             HEART RATE= 136  bpm  RR Interval= 441  ms  CT  Interval=   ms  P Horizontal Axis=   deg  P Front Axis=   deg  QRSD Interval= 91  ms  QT Interval= 326  ms  QRS Axis= 37  deg  T Wave Axis= 164  deg  - ABNORMAL ECG -  Atrial fibrillation  Repolarization abnormality, prob rate related             Current Facility-Administered Medications:   •  acetaminophen (TYLENOL) tablet 500 mg, 500 mg, Oral, Q6H PRN, Mango Arnold MD  •  [START ON 6/6/2022] amLODIPine (NORVASC) tablet 5 mg, 5 mg, Oral, Daily, Mango Arnold MD  •  cefTRIAXone (ROCEPHIN) 1 g in sodium chloride 0.9 % 100 mL IVPB-VTB, 1 g, Intravenous, Q24H, Mango Arnold MD  •  famotidine (PEPCID) tablet 10 mg, 10 mg, Oral, BID, Mango Arnold MD  •  gabapentin (NEURONTIN) capsule 300 mg, 300 mg, Oral, BID, Mango Arnold MD  •  [START ON 6/6/2022] insulin glargine (LANTUS, SEMGLEE) injection 15 Units, 15 Units, Subcutaneous, Nightly, Mango Arnold MD  •  insulin lispro (ADMELOG) injection 5 Units, 5 Units, Subcutaneous, TID With Meals, Mango Arnold MD  •  levothyroxine (SYNTHROID, LEVOTHROID) tablet 25 mcg, 25 mcg, Oral, Daily, Mango Arnold MD  •  LORazepam (ATIVAN) tablet 0.5 mg, 0.5 mg, Oral, Q6H PRN, Mango Arnold MD  •  metoprolol tartrate (LOPRESSOR) tablet 12.5 mg, 12.5 mg, Oral, BID, Mango Arnold MD  •  montelukast (SINGULAIR) tablet 10 mg, 10 mg, Oral, Daily, Mango Arnold MD  •  [START ON 6/6/2022] predniSONE (DELTASONE) tablet 10 mg, 10 mg, Oral, Daily, Mango Arnold MD  •  [START ON 6/6/2022] rosuvastatin (CRESTOR) tablet 5 mg, 5 mg, Oral, Nightly, Mango Arnold MD  •  tamsulosin (FLOMAX) 24 hr capsule 0.4 mg, 0.4 mg, Oral, Daily, Mango Arnold MD  •  vitamin B-12 (CYANOCOBALAMIN) tablet 1,000 mcg, 1,000 mcg, Oral, Daily, Mango Arnold MD    Current Outpatient Medications:   •  acetaminophen (TYLENOL) 500 MG tablet, Take 500 mg by mouth Every 6 (Six) Hours As Needed for Mild Pain ., Disp: , Rfl:   •  amLODIPine (NORVASC) 10 MG tablet, Take 1 tablet by mouth daily., Disp: , Rfl:   •  ammonium lactate  (AMLACTIN) 12 % cream, Apply 1 application topically to the appropriate area as directed As Needed for Dry Skin., Disp: , Rfl:   •  ascorbic acid (VITAMIN C) 500 MG tablet, Take 500 mg by mouth., Disp: , Rfl:   •  bisacodyl (DULCOLAX) 10 MG suppository, Insert 10 mg into the rectum., Disp: , Rfl:   •  Cyanocobalamin (VITAMIN B 12 PO), Take  by mouth., Disp: , Rfl:   •  famotidine (PEPCID) 10 MG tablet, Take 10 mg by mouth 2 (Two) Times a Day., Disp: , Rfl:   •  gabapentin (NEURONTIN) 300 MG capsule, Take 300 mg by mouth 2 (Two) Times a Day., Disp: , Rfl:   •  hydrALAZINE (APRESOLINE) 25 MG tablet, Take 1 tablet by mouth Every 8 (Eight) Hours for 30 days., Disp: 90 tablet, Rfl: 0  •  insulin glargine (LANTUS, SEMGLEE) 100 UNIT/ML injection, Inject 22 Units under the skin into the appropriate area as directed Daily., Disp: , Rfl:   •  Insulin Lispro (ADMELOG SOLOSTAR SC), Inject  under the skin into the appropriate area as directed., Disp: , Rfl:   •  insulin lispro (humaLOG) 100 UNIT/ML injection, Inject  under the skin into the appropriate area as directed., Disp: , Rfl:   •  isosorbide mononitrate (IMDUR) 30 MG 24 hr tablet, Take 1 tablet by mouth Daily for 30 days., Disp: 30 tablet, Rfl: 0  •  levothyroxine (SYNTHROID, LEVOTHROID) 25 MCG tablet, Take 1 tablet by mouth daily., Disp: , Rfl:   •  linagliptin (TRADJENTA) 5 MG tablet tablet, Take 5 mg by mouth daily., Disp: , Rfl:   •  LORazepam (ATIVAN) 0.5 MG tablet, Take 0.5 mg by mouth., Disp: , Rfl:   •  magnesium hydroxide (MILK OF MAGNESIA) 400 MG/5ML suspension, Take  by mouth Daily As Needed for Constipation., Disp: , Rfl:   •  metoprolol tartrate (LOPRESSOR) 25 MG tablet, Take 12.5 mg by mouth 2 (Two) Times a Day., Disp: , Rfl:   •  montelukast (SINGULAIR) 10 MG tablet, Take 1 tablet by mouth daily., Disp: , Rfl:   •  polyethylene glycol (MIRALAX) 17 GM/SCOOP powder, Take 17 g by mouth., Disp: , Rfl:   •  predniSONE (DELTASONE) 5 MG tablet, Take 15 mg by  mouth Daily., Disp: , Rfl:   •  rosuvastatin (CRESTOR) 5 MG tablet, Take 1 tablet by mouth Daily., Disp: , Rfl:   •  sodium bicarbonate 650 MG tablet, Take 650 mg by mouth 3 (Three) Times a Day., Disp: , Rfl:   •  spironolactone (ALDACTONE) 25 MG tablet, Take 0.5 tablets by mouth Daily for 30 days., Disp: 15 tablet, Rfl: 0  •  tamsulosin (FLOMAX) 0.4 MG capsule 24 hr capsule, Take 0.4 mg by mouth., Disp: , Rfl:     ASSESSMENT  Acute UTI with sepsis  Paroxysmal atrial fibrillation  Chronic diastolic CHF  Diabetes mellitus  Hypertension  Hyperlipidemia  Aortic stenosis s/p AVR  Pulmonary hypertension  Chronic indwelling Hoffman catheter  Gastroesophageal reflux disease    PLAN  Admit  IV antibiotics  Adjust nursing home medications  Stress ulcer DVT prophylaxis  Supportive care  DNR  Discussed with son and nursing staff  Follow closely further recommendation current hospital course    KADEEM RIVAS MD

## 2022-06-05 NOTE — ED TRIAGE NOTES
Pt arrived via ems from [place] with complaints of a fever with a known UTI.     Pt was wearing a mask during assessment.  This RN wore appropriate PPE

## 2022-06-06 ENCOUNTER — TELEPHONE (OUTPATIENT)
Dept: ONCOLOGY | Facility: CLINIC | Age: 87
End: 2022-06-06

## 2022-06-06 LAB
ALBUMIN SERPL-MCNC: 3.1 G/DL (ref 3.5–5.2)
ALBUMIN/GLOB SERPL: 1.2 G/DL
ALP SERPL-CCNC: 57 U/L (ref 39–117)
ALT SERPL W P-5'-P-CCNC: 21 U/L (ref 1–33)
ANION GAP SERPL CALCULATED.3IONS-SCNC: 13 MMOL/L (ref 5–15)
AST SERPL-CCNC: 13 U/L (ref 1–32)
BASOPHILS # BLD AUTO: 0.02 10*3/MM3 (ref 0–0.2)
BASOPHILS NFR BLD AUTO: 0.3 % (ref 0–1.5)
BILIRUB SERPL-MCNC: 0.6 MG/DL (ref 0–1.2)
BUN SERPL-MCNC: 22 MG/DL (ref 8–23)
BUN/CREAT SERPL: 14.9 (ref 7–25)
CALCIUM SPEC-SCNC: 8.6 MG/DL (ref 8.2–9.6)
CHLORIDE SERPL-SCNC: 104 MMOL/L (ref 98–107)
CHOLEST SERPL-MCNC: 101 MG/DL (ref 0–200)
CO2 SERPL-SCNC: 20 MMOL/L (ref 22–29)
CREAT SERPL-MCNC: 1.48 MG/DL (ref 0.57–1)
DEPRECATED RDW RBC AUTO: 50.2 FL (ref 37–54)
EGFRCR SERPLBLD CKD-EPI 2021: 33.3 ML/MIN/1.73
EOSINOPHIL # BLD AUTO: 0.2 10*3/MM3 (ref 0–0.4)
EOSINOPHIL NFR BLD AUTO: 2.7 % (ref 0.3–6.2)
ERYTHROCYTE [DISTWIDTH] IN BLOOD BY AUTOMATED COUNT: 14.8 % (ref 12.3–15.4)
GLOBULIN UR ELPH-MCNC: 2.5 GM/DL
GLUCOSE BLDC GLUCOMTR-MCNC: 124 MG/DL (ref 70–130)
GLUCOSE BLDC GLUCOMTR-MCNC: 339 MG/DL (ref 70–130)
GLUCOSE BLDC GLUCOMTR-MCNC: 346 MG/DL (ref 70–130)
GLUCOSE BLDC GLUCOMTR-MCNC: 382 MG/DL (ref 70–130)
GLUCOSE SERPL-MCNC: 139 MG/DL (ref 65–99)
HCT VFR BLD AUTO: 34.1 % (ref 34–46.6)
HDLC SERPL-MCNC: 41 MG/DL (ref 40–60)
HGB BLD-MCNC: 10.8 G/DL (ref 12–15.9)
IMM GRANULOCYTES # BLD AUTO: 0.03 10*3/MM3 (ref 0–0.05)
IMM GRANULOCYTES NFR BLD AUTO: 0.4 % (ref 0–0.5)
LDLC SERPL CALC-MCNC: 38 MG/DL (ref 0–100)
LDLC/HDLC SERPL: 0.86 {RATIO}
LYMPHOCYTES # BLD AUTO: 1.11 10*3/MM3 (ref 0.7–3.1)
LYMPHOCYTES NFR BLD AUTO: 14.9 % (ref 19.6–45.3)
MCH RBC QN AUTO: 29 PG (ref 26.6–33)
MCHC RBC AUTO-ENTMCNC: 31.7 G/DL (ref 31.5–35.7)
MCV RBC AUTO: 91.7 FL (ref 79–97)
MONOCYTES # BLD AUTO: 0.42 10*3/MM3 (ref 0.1–0.9)
MONOCYTES NFR BLD AUTO: 5.6 % (ref 5–12)
NEUTROPHILS NFR BLD AUTO: 5.66 10*3/MM3 (ref 1.7–7)
NEUTROPHILS NFR BLD AUTO: 76.1 % (ref 42.7–76)
NRBC BLD AUTO-RTO: 0 /100 WBC (ref 0–0.2)
NT-PROBNP SERPL-MCNC: 4713 PG/ML (ref 0–1800)
PLATELET # BLD AUTO: 64 10*3/MM3 (ref 140–450)
PMV BLD AUTO: 12.1 FL (ref 6–12)
POTASSIUM SERPL-SCNC: 4 MMOL/L (ref 3.5–5.2)
PROT SERPL-MCNC: 5.6 G/DL (ref 6–8.5)
RBC # BLD AUTO: 3.72 10*6/MM3 (ref 3.77–5.28)
SODIUM SERPL-SCNC: 137 MMOL/L (ref 136–145)
TRIGL SERPL-MCNC: 124 MG/DL (ref 0–150)
TSH SERPL DL<=0.05 MIU/L-ACNC: 0.84 UIU/ML (ref 0.27–4.2)
VIT B12 BLD-MCNC: 899 PG/ML (ref 211–946)
VLDLC SERPL-MCNC: 22 MG/DL (ref 5–40)
WBC NRBC COR # BLD: 7.44 10*3/MM3 (ref 3.4–10.8)

## 2022-06-06 PROCEDURE — 63710000001 INSULIN LISPRO (HUMAN) PER 5 UNITS: Performed by: HOSPITALIST

## 2022-06-06 PROCEDURE — 84443 ASSAY THYROID STIM HORMONE: CPT | Performed by: HOSPITALIST

## 2022-06-06 PROCEDURE — 82962 GLUCOSE BLOOD TEST: CPT

## 2022-06-06 PROCEDURE — 97530 THERAPEUTIC ACTIVITIES: CPT

## 2022-06-06 PROCEDURE — 83880 ASSAY OF NATRIURETIC PEPTIDE: CPT | Performed by: HOSPITALIST

## 2022-06-06 PROCEDURE — 82607 VITAMIN B-12: CPT | Performed by: HOSPITALIST

## 2022-06-06 PROCEDURE — 80053 COMPREHEN METABOLIC PANEL: CPT | Performed by: HOSPITALIST

## 2022-06-06 PROCEDURE — 85025 COMPLETE CBC W/AUTO DIFF WBC: CPT | Performed by: HOSPITALIST

## 2022-06-06 PROCEDURE — 25010000002 CEFEPIME PER 500 MG: Performed by: HOSPITALIST

## 2022-06-06 PROCEDURE — 63710000001 PREDNISONE PER 5 MG: Performed by: HOSPITALIST

## 2022-06-06 PROCEDURE — 97166 OT EVAL MOD COMPLEX 45 MIN: CPT

## 2022-06-06 PROCEDURE — 97162 PT EVAL MOD COMPLEX 30 MIN: CPT

## 2022-06-06 PROCEDURE — 36415 COLL VENOUS BLD VENIPUNCTURE: CPT | Performed by: HOSPITALIST

## 2022-06-06 PROCEDURE — 80061 LIPID PANEL: CPT | Performed by: HOSPITALIST

## 2022-06-06 RX ORDER — POLYETHYLENE GLYCOL 3350 17 G/17G
17 POWDER, FOR SOLUTION ORAL
Status: DISCONTINUED | OUTPATIENT
Start: 2022-06-06 | End: 2022-06-09 | Stop reason: HOSPADM

## 2022-06-06 RX ORDER — ISOSORBIDE MONONITRATE 30 MG/1
30 TABLET, EXTENDED RELEASE ORAL DAILY
COMMUNITY
End: 2022-06-27 | Stop reason: ALTCHOICE

## 2022-06-06 RX ORDER — DOCUSATE SODIUM 100 MG/1
100 CAPSULE, LIQUID FILLED ORAL 2 TIMES DAILY
Status: DISCONTINUED | OUTPATIENT
Start: 2022-06-06 | End: 2022-06-09 | Stop reason: HOSPADM

## 2022-06-06 RX ORDER — PREDNISONE 10 MG/1
10 TABLET ORAL
Status: DISCONTINUED | OUTPATIENT
Start: 2022-06-08 | End: 2022-06-09 | Stop reason: HOSPADM

## 2022-06-06 RX ORDER — POLYETHYLENE GLYCOL 3350 17 G/17G
17 POWDER, FOR SOLUTION ORAL DAILY
Status: DISCONTINUED | OUTPATIENT
Start: 2022-06-06 | End: 2022-06-06

## 2022-06-06 RX ORDER — HYDRALAZINE HYDROCHLORIDE 25 MG/1
25 TABLET, FILM COATED ORAL 3 TIMES DAILY
COMMUNITY
End: 2022-06-09 | Stop reason: HOSPADM

## 2022-06-06 RX ORDER — POTASSIUM CHLORIDE 750 MG/1
10 CAPSULE, EXTENDED RELEASE ORAL DAILY
COMMUNITY
End: 2022-06-09 | Stop reason: HOSPADM

## 2022-06-06 RX ORDER — BISACODYL 10 MG
10 SUPPOSITORY, RECTAL RECTAL DAILY PRN
Status: DISCONTINUED | OUTPATIENT
Start: 2022-06-06 | End: 2022-06-09

## 2022-06-06 RX ORDER — LORAZEPAM 0.5 MG/1
0.5 TABLET ORAL NIGHTLY
Status: DISCONTINUED | OUTPATIENT
Start: 2022-06-06 | End: 2022-06-09 | Stop reason: HOSPADM

## 2022-06-06 RX ORDER — SPIRONOLACTONE 25 MG/1
12.5 TABLET ORAL DAILY
COMMUNITY
End: 2022-06-09 | Stop reason: HOSPADM

## 2022-06-06 RX ORDER — FUROSEMIDE 20 MG/1
20 TABLET ORAL DAILY
COMMUNITY
End: 2022-06-09 | Stop reason: HOSPADM

## 2022-06-06 RX ORDER — NITROFURANTOIN 25; 75 MG/1; MG/1
100 CAPSULE ORAL 2 TIMES DAILY
COMMUNITY
Start: 2022-06-03 | End: 2022-06-09 | Stop reason: HOSPADM

## 2022-06-06 RX ADMIN — CEFEPIME HYDROCHLORIDE 2 G: 2 INJECTION, POWDER, FOR SOLUTION INTRAVENOUS at 15:34

## 2022-06-06 RX ADMIN — INSULIN LISPRO 5 UNITS: 100 INJECTION, SOLUTION INTRAVENOUS; SUBCUTANEOUS at 21:30

## 2022-06-06 RX ADMIN — POLYETHYLENE GLYCOL 3350 17 G: 17 POWDER, FOR SOLUTION ORAL at 17:34

## 2022-06-06 RX ADMIN — LEVOTHYROXINE SODIUM 25 MCG: 0.03 TABLET ORAL at 08:13

## 2022-06-06 RX ADMIN — METOPROLOL TARTRATE 12.5 MG: 25 TABLET, FILM COATED ORAL at 21:26

## 2022-06-06 RX ADMIN — MONTELUKAST SODIUM 10 MG: 10 TABLET, FILM COATED ORAL at 08:14

## 2022-06-06 RX ADMIN — POLYETHYLENE GLYCOL 3350 17 G: 17 POWDER, FOR SOLUTION ORAL at 15:35

## 2022-06-06 RX ADMIN — INSULIN LISPRO 5 UNITS: 100 INJECTION, SOLUTION INTRAVENOUS; SUBCUTANEOUS at 11:54

## 2022-06-06 RX ADMIN — FAMOTIDINE 20 MG: 20 TABLET ORAL at 21:26

## 2022-06-06 RX ADMIN — INSULIN LISPRO 5 UNITS: 100 INJECTION, SOLUTION INTRAVENOUS; SUBCUTANEOUS at 17:39

## 2022-06-06 RX ADMIN — ACETAMINOPHEN 500 MG: 500 TABLET ORAL at 21:28

## 2022-06-06 RX ADMIN — AMLODIPINE BESYLATE 5 MG: 5 TABLET ORAL at 08:14

## 2022-06-06 RX ADMIN — INSULIN LISPRO 5 UNITS: 100 INJECTION, SOLUTION INTRAVENOUS; SUBCUTANEOUS at 11:53

## 2022-06-06 RX ADMIN — ROSUVASTATIN CALCIUM 5 MG: 5 TABLET, FILM COATED ORAL at 21:34

## 2022-06-06 RX ADMIN — GABAPENTIN 300 MG: 300 CAPSULE ORAL at 08:14

## 2022-06-06 RX ADMIN — TAMSULOSIN HYDROCHLORIDE 0.4 MG: 0.4 CAPSULE ORAL at 08:14

## 2022-06-06 RX ADMIN — INSULIN GLARGINE-YFGN 15 UNITS: 100 INJECTION, SOLUTION SUBCUTANEOUS at 21:31

## 2022-06-06 RX ADMIN — FAMOTIDINE 20 MG: 20 TABLET ORAL at 08:14

## 2022-06-06 RX ADMIN — LORAZEPAM 0.5 MG: 0.5 TABLET ORAL at 21:26

## 2022-06-06 RX ADMIN — Medication 1000 MCG: at 08:14

## 2022-06-06 RX ADMIN — METOPROLOL TARTRATE 12.5 MG: 25 TABLET, FILM COATED ORAL at 08:13

## 2022-06-06 RX ADMIN — DOCUSATE SODIUM 100 MG: 100 CAPSULE, LIQUID FILLED ORAL at 21:26

## 2022-06-06 RX ADMIN — GABAPENTIN 300 MG: 300 CAPSULE ORAL at 21:26

## 2022-06-06 RX ADMIN — INSULIN LISPRO 6 UNITS: 100 INJECTION, SOLUTION INTRAVENOUS; SUBCUTANEOUS at 17:39

## 2022-06-06 RX ADMIN — PREDNISONE 10 MG: 10 TABLET ORAL at 08:14

## 2022-06-06 NOTE — TELEPHONE ENCOUNTER
Caller: KEVIN HUERTA    Relationship to patient: DAUGHTER    Best call back number: [phone]    KEVIN IS CALLING TO LET US KNOW PT IS IN HOSPITAL AND NEEDS TO CANCEL 6-8-2022 LAB AND F/U APPT.

## 2022-06-06 NOTE — CONSULTS
CONSULT NOTE    Infectious Diseases - Dheeraj Gabriel MD  [facility]       Patient Identification:  Name: Helena Carmen  Age: 89 y.o.  Sex: female  :  1931  MRN: [id number]             Date of Consultation: 2022      Primary Care Physician: Mariah Hickman MD                               Requesting Physician: Dr. Arnold  Reason for Consultation: Sepsis    Impression: 89-year-old female with complicated past medical history including chronic kidney disease, history of C. difficile colitis, history of bioprosthetic aortic valve replacement, history of hypertension hypothyroidism morbid obesity paroxysmal atrial fibrillation and type 2 diabetes who is legally blind and has chronic indwelling Hoffman catheter since March who was recently hospitalized for 7 days in 2022 for congestive heart failure exacerbation and acute on chronic kidney disease and significant functional decline.  She has been at the nursing home since and was noted to have increasing pain and discomfort in the abdomen which was thought to be due to urinary tract infection.  Patient was treated with antibiotics without any success and was sent to the emergency room on 2022 because of fever.  Patient was started on nitrofurantoin this past Friday after the catheter was changed.  Work-up in the emergency room did reveal abnormal urinalysis consistent with UTI and patient was started on ceftriaxone.  Upon arrival her temperature was 102.6.  Patient has been on ceftriaxone for the last 48 hours and has significant improvement in her temperature pattern and sense of wellbeing.  Her nitrofurantoin has been discontinued.  Urine culture not showing Pseudomonas species greater than 100,000 CFU per mL as well as 50,000  CFU per mL of gram-negative dianna which is not identified yet.  Patient white blood cell count improved from 14,000-7000 without any use of specific antipseudomonal agents.  Her renal function is also  improved.  She has chronic thrombocytopenia and platelet count is 74,000.  Imaging studies performed did not show any acute infiltrate.  This presentation a febrile illness in the setting of chronic indwelling Hoffman catheter and abnormal urinalysis and introduction of Macrodantin followed by high fever with improvement on nonpseudomonal antibiotic regimen and discontinuation of Macrodantin with urine culture positive for multiple pathogens is consistent with:  1-probable Macrodantin induced fever versus  2-urinary tract infection due to indwelling catheter caused by pathogens other the Pseudomonas as patient has shown improvement on treatment that should not have addressed Pseudomonas  3-probable Pseudomonas species colonization  4-diabetes mellitus  5-chronic kidney disease  6-history of atrial fibrillation  7-legally blind due to macular degeneration  8-thrombocytopenia  9-other diagnosis per primary team.    Recommendations/Discussions:  This is a very interesting situation patient is improved significantly in terms of her fever pattern, resolution of leukocytosis and also shows improvement in her sense of wellbeing on treatment that should not have been effective against Pseudomonas species.  The likely explanation for her febrile illness is either infection caused by pathogen not covered by Macrodantin and not Pseudomonas and responded to ceftriaxone or systemic reaction to Macrodantin causing drug fever which is now improved after its discontinuation.  Is not unreasonable to change antibiotic regimen to cefepime to address Pseudomonas aeruginosa by her clinical course and WBC pattern and sense of wellbeing is being followed.  If she continues to do well and the gram-negative rods turned out to be the pathogen sensitive to ceftriaxone then we can assume that the Pseudomonas species are colonization and not the true culprit.  Also Macrodantin induced drug fever is always a possibility.  Management of other  issues per primary team.  Would recommend changing Hoffman catheter again after being on cefepime for 24 hours given the positive urine culture to attempt to prevent future episodes of UTI.  Thank you Dr. Gonzalez for letting me be the part of your patient care please see above impression and recommendations      History of Present Illness:   89-year-old female with complicated past medical history including chronic kidney disease, history of C. difficile colitis, history of bioprosthetic aortic valve replacement, history of hypertension hypothyroidism morbid obesity paroxysmal atrial fibrillation and type 2 diabetes who is legally blind and has chronic indwelling Hoffman catheter since March who was recently hospitalized for 7 days in March 2022 for congestive heart failure exacerbation and acute on chronic kidney disease and significant functional decline.  She has been at the nursing home since and was noted to have increasing pain and discomfort in the abdomen which was thought to be due to urinary tract infection.  Patient was treated with antibiotics without any success and was sent to the emergency room on 6/5/2022 because of fever.  Patient was started on nitrofurantoin this past Friday after the catheter was changed.  Work-up in the emergency room did reveal abnormal urinalysis consistent with UTI and patient was started on ceftriaxone.  Upon arrival her temperature was 102.6.  Patient has been on ceftriaxone for the last 48 hours and has significant improvement in her temperature pattern and sense of wellbeing.  Her nitrofurantoin has been discontinued.  Urine culture not showing Pseudomonas species greater than 100,000 CFU per mL as well as 50,000  CFU per mL of gram-negative dianna which is not identified yet.  Patient white blood cell count improved from 14,000-7000 without any use of specific antipseudomonal agents.  Her renal function is also improved.  She has chronic thrombocytopenia and platelet count is  74,000.  Imaging studies performed did not show any acute infiltrate.      Past Medical History:  Past Medical History:   Diagnosis Date   • Aortic valve stenosis     s/p tissue AVR   • Back pain    • CKD (chronic kidney disease)    • Colitis due to Clostridioides difficile 01/26/2022   • Diastolic dysfunction     Grade 2 per echocardiogram 2013   • Diverticulosis    • Exertional shortness of breath    • Heart disease    • Hiatal hernia    • Hyperlipidemia    • Hypertension    • Hyperthyroidism    • Hypertriglyceridemia 05/31/2018   • Hypothyroidism    • Left ventricular hypertrophy    • Legally blind    • Liver disease    • Macular degeneration    • Mitral regurgitation    • Osteoarthritis of hip    • Pancreatitis 01/26/2022   • Paroxysmal atrial fibrillation (HCC)    • Premature ventricular contractions    • Pulmonary hypertension (HCC)    • Renal insufficiency syndrome    • Type 2 diabetes mellitus (HCC)    • Uterine cancer (HCC)      Past Surgical History:  Past Surgical History:   Procedure Laterality Date   • AORTIC VALVE REPAIR/REPLACEMENT     • CATARACT EXTRACTION      1970, 1999   • ENDOSCOPY  08/15/2014    no gross lesions in stomach/duodenum, erythrematous mucosa in stomach   • HYSTERECTOMY  2007   • STERNOTOMY        Home Meds:  Medications Prior to Admission   Medication Sig Dispense Refill Last Dose   • acetaminophen (TYLENOL) 500 MG tablet Take 1,000 mg by mouth Every 6 (Six) Hours As Needed for Mild Pain .      • amLODIPine (NORVASC) 10 MG tablet Take 1 tablet by mouth daily.      • ammonium lactate (AMLACTIN) 12 % cream Apply 1 application topically to the appropriate area as directed Every 12 (Twelve) Hours.      • ascorbic acid (VITAMIN C) 500 MG tablet Take 500 mg by mouth 2 (Two) Times a Day.      • bisacodyl (DULCOLAX) 10 MG suppository Insert 10 mg into the rectum Daily As Needed for Constipation.      • Cyanocobalamin (VITAMIN B 12 PO) Take 1,000 mcg by mouth Daily.      • famotidine (PEPCID)  10 MG tablet Take 10 mg by mouth 2 (Two) Times a Day.      • furosemide (LASIX) 20 MG tablet Take 20 mg by mouth Daily.      • gabapentin (NEURONTIN) 300 MG capsule Take 300 mg by mouth 2 (Two) Times a Day.      • hydrALAZINE (APRESOLINE) 25 MG tablet Take 25 mg by mouth 3 (Three) Times a Day.      • insulin glargine (LANTUS, SEMGLEE) 100 UNIT/ML injection Inject 22 Units under the skin into the appropriate area as directed Every Night.      • Insulin Lispro (ADMELOG SOLOSTAR SC) Inject 10 Units under the skin into the appropriate area as directed 3 (Three) Times a Day With Meals.      • insulin lispro (humaLOG) 100 UNIT/ML injection Inject 0-10 Units under the skin into the appropriate area as directed 3 (Three) Times a Day Before Meals. 2 units for every 50 > 150      • isosorbide mononitrate (IMDUR) 30 MG 24 hr tablet Take 30 mg by mouth Daily.      • levothyroxine (SYNTHROID, LEVOTHROID) 25 MCG tablet Take 1 tablet by mouth daily.      • linagliptin (TRADJENTA) 5 MG tablet tablet Take 5 mg by mouth daily.      • LORazepam (ATIVAN) 0.5 MG tablet Take 0.5 mg by mouth every night at bedtime.      • magnesium hydroxide (MILK OF MAGNESIA) 400 MG/5ML suspension Take  by mouth Daily As Needed for Constipation.      • metoprolol tartrate (LOPRESSOR) 25 MG tablet Take 12.5 mg by mouth 2 (Two) Times a Day.      • montelukast (SINGULAIR) 10 MG tablet Take 1 tablet by mouth Every Night.      • nitrofurantoin, macrocrystal-monohydrate, (MACROBID) 100 MG capsule Take 100 mg by mouth 2 (Two) Times a Day.      • polyethylene glycol (MIRALAX) 17 GM/SCOOP powder Take 17 g by mouth Daily.      • potassium chloride (MICRO-K) 10 MEQ CR capsule Take 10 mEq by mouth Daily.      • predniSONE (DELTASONE) 5 MG tablet Take 10 mg by mouth Every Other Day.      • rosuvastatin (CRESTOR) 5 MG tablet Take 1 tablet by mouth Daily.      • sodium bicarbonate 650 MG tablet Take 650 mg by mouth 3 (Three) Times a Day.      • spironolactone  (ALDACTONE) 25 MG tablet Take 12.5 mg by mouth Daily.      • tamsulosin (FLOMAX) 0.4 MG capsule 24 hr capsule Take 0.4 mg by mouth every night at bedtime.        Current Meds:     Current Facility-Administered Medications:   •  acetaminophen (TYLENOL) tablet 500 mg, 500 mg, Oral, Q6H PRN, Mango Arnold MD, 500 mg at 06/05/22 2057  •  amLODIPine (NORVASC) tablet 5 mg, 5 mg, Oral, Daily, Mango Arnold MD, 5 mg at 06/06/22 0814  •  cefepime 2 gm IVPB in 100 ml NS (VTB), 2 g, Intravenous, Once, Mango Arnold MD  •  [START ON 6/7/2022] cefepime 2 gm IVPB in 100 ml NS (VTB), 2 g, Intravenous, Q24H, Mango Arnold MD  •  famotidine (PEPCID) tablet 20 mg, 20 mg, Oral, BID, Mango Arnold MD, 20 mg at 06/06/22 0814  •  gabapentin (NEURONTIN) capsule 300 mg, 300 mg, Oral, BID, Mango Arnold MD, 300 mg at 06/06/22 0814  •  insulin glargine (LANTUS, SEMGLEE) injection 15 Units, 15 Units, Subcutaneous, Nightly, Mango Arnold MD  •  insulin lispro (ADMELOG) injection 0-7 Units, 0-7 Units, Subcutaneous, 4x Daily With Meals & Nightly, Mango Arnold MD, 5 Units at 06/06/22 1154  •  insulin lispro (ADMELOG) injection 5 Units, 5 Units, Subcutaneous, TID With Meals, Mango Arnold MD, 5 Units at 06/06/22 1153  •  levothyroxine (SYNTHROID, LEVOTHROID) tablet 25 mcg, 25 mcg, Oral, Daily, Mango Arnold MD, 25 mcg at 06/06/22 0813  •  LORazepam (ATIVAN) tablet 0.5 mg, 0.5 mg, Oral, Nightly, Mango Arnold MD  •  metoprolol tartrate (LOPRESSOR) tablet 12.5 mg, 12.5 mg, Oral, BID, Mango Arnold MD, 12.5 mg at 06/06/22 0813  •  montelukast (SINGULAIR) tablet 10 mg, 10 mg, Oral, Daily, Mango Arnold MD, 10 mg at 06/06/22 0814  •  [START ON 6/8/2022] predniSONE (DELTASONE) tablet 10 mg, 10 mg, Oral, Q48H, Mango Arnold MD  •  rosuvastatin (CRESTOR) tablet 5 mg, 5 mg, Oral, Nightly, Mango Arnold MD  •  tamsulosin (FLOMAX) 24 hr capsule 0.4 mg, 0.4 mg, Oral, Daily, Mango Arnold MD, 0.4 mg at 06/06/22 0814  Allergies:  Allergies   Allergen Reactions    • Cephalexin Unknown (See Comments) and Other (See Comments)     Pt states she does not remember reaction but it was many years ago   Pt states she does not remember reaction but it was many years ago   Pt states she does not remember reaction but it was many years ago    • Erythromycin Unknown (See Comments) and Other (See Comments)     Pt states she does not remember but it was many years ago  Pt states she does not remember but it was many years ago   • Penicillins Rash   • Sulfa Antibiotics Itching and Rash     Social History:   Social History     Tobacco Use   • Smoking status: Former Smoker     Packs/day: 0.50     Quit date: 7/10/1969     Years since quittin.9   • Smokeless tobacco: Never Used   Substance Use Topics   • Alcohol use: No     Comment: caffeine use - coffee 2 cups daily      Family History:  Family History   Problem Relation Age of Onset   • Heart disease Mother    • Hypertension Mother    • Stroke Mother    • Diabetes Mother         mellitus   • Other Other         cardiovascular disorder          Review of Systems  See history of present illness and past medical history.  Feeling much better compared to 24 hours ago.  At the time of admission review system was recorded as follows:    Constitutional: Positive for chills, fatigue and fever.   HENT: Negative for sore throat.    Eyes: Negative.    Respiratory: Negative for cough and shortness of breath.    Cardiovascular: Negative for chest pain.   Gastrointestinal: Positive for vomiting. Negative for abdominal pain and diarrhea.   Genitourinary: Negative for dysuria.   Musculoskeletal: Negative for neck pain.   Skin: Negative for rash.   Allergic/Immunologic: Negative.    Neurological: Positive for weakness. Negative for numbness and headaches.   Hematological: Negative.    Psychiatric/Behavioral: Negative.    All other systems reviewed and are negative.  Remainder of ROS is negative.      Vitals:   /55 (BP Location: Left arm, Patient  "Position: Lying)   Pulse 72   Temp 97.8 °F (36.6 °C) (Oral)   Resp 16   Ht 144.8 cm (57\")   Wt 67.1 kg (148 lb)   SpO2 94%   BMI 32.03 kg/m²   I/O:     Intake/Output Summary (Last 24 hours) at 6/6/2022 1435  Last data filed at 6/6/2022 0600  Gross per 24 hour   Intake 240 ml   Output 1625 ml   Net -1385 ml     Exam:  Patient is examined using the personal protective equipment as per guidelines from infection control for this particular patient as enacted.  Hand washing was performed before and after patient interaction.  General Appearance:   Alert cooperative morbidly obese female who is legally blind pleasant and does not appear to be toxic or septic.   Head:    Normocephalic, without obvious abnormality, atraumatic   Eyes:    PERRL, conjunctivae/corneas clear, EOM's intact, both eyes   Ears:    Normal external ear canals, both ears   Nose:   Nares normal, septum midline, mucosa normal, no drainage    or sinus tenderness   Throat:   Lips, tongue, gums normal; oral mucosa pink and moist   Neck:   Supple, symmetrical, trachea midline, no adenopathy;     thyroid:  no enlargement/tenderness/nodules; no carotid    bruit or JVD   Back:     Symmetric, no curvature, ROM normal, no CVA tenderness   Lungs:     Clear to auscultation bilaterally, respirations unlabored   Chest Wall:    No tenderness or deformity    Heart:   S1-S2 irregularly irregular   Abdomen:    Obese soft nontender   Extremities:  Bilateral trace edema noted   Pulses:   Pulses palpable in all extremities; symmetric all extremities   Skin:  Ecchymotic changes noted   Neurologic:  Blind with grossly nonfocal examination       Data Review:    I reviewed the patient's new clinical results.  Results from last 7 days   Lab Units 06/06/22  0439 06/05/22  1218   WBC 10*3/mm3 7.44 14.93*   HEMOGLOBIN g/dL 10.8* 12.0   PLATELETS 10*3/mm3 64* 74*     Results from last 7 days   Lab Units 06/06/22  0439 06/05/22  1218   SODIUM mmol/L 137 135*   POTASSIUM mmol/L " 4.0 4.4   CHLORIDE mmol/L 104 98   CO2 mmol/L 20.0* 24.0   BUN mg/dL 22 22   CREATININE mg/dL 1.48* 1.73*   CALCIUM mg/dL 8.6 9.3   GLUCOSE mg/dL 139* 152*     Microbiology Results (last 10 days)     Procedure Component Value - Date/Time    Blood Culture - Blood, Arm, Left [[id number]]  (Normal) Collected: 06/05/22 1311    Lab Status: Preliminary result Specimen: Blood from Arm, Left Updated: 06/06/22 1318     Blood Culture No growth at 24 hours    Blood Culture - Blood, Arm, Left [[id number]]  (Normal) Collected: 06/05/22 1218    Lab Status: Preliminary result Specimen: Blood from Arm, Left Updated: 06/06/22 1233     Blood Culture No growth at 24 hours    Urine Culture - Urine, Urine, Catheter [[id number]]  (Abnormal) Collected: 06/05/22 1211    Lab Status: Preliminary result Specimen: Urine, Catheter Updated: 06/06/22 1206     Urine Culture >100,000 CFU/mL Pseudomonas species      50,000 CFU/mL Gram Negative Bacilli    Narrative:      Colonization of the urinary tract without infection is common. Treatment is discouraged unless the patient is symptomatic, pregnant, or undergoing an invasive urologic procedure.  Colonization of the urinary tract without infection is common. Treatment is discouraged unless the patient is symptomatic, pregnant, or undergoing an invasive urologic procedure.    Respiratory Panel PCR w/COVID-19(SARS-CoV-2) LATONIA/CHARLIE/ALEJANDRO/PAD/COR/MAD/PASQUALE In-House, NP Swab in UTM/VTM, 3-4 HR TAT - Swab, Nasopharynx [[id number]]  (Normal) Collected: 06/05/22 1206    Lab Status: Final result Specimen: Swab from Nasopharynx Updated: 06/05/22 1321     ADENOVIRUS, PCR Not Detected     Coronavirus 229E Not Detected     Coronavirus HKU1 Not Detected     Coronavirus NL63 Not Detected     Coronavirus OC43 Not Detected     COVID19 Not Detected     Human Metapneumovirus Not Detected     Human Rhinovirus/Enterovirus Not Detected     Influenza A PCR Not Detected     Influenza B PCR Not Detected     Parainfluenza Virus 1 Not  Detected     Parainfluenza Virus 2 Not Detected     Parainfluenza Virus 3 Not Detected     Parainfluenza Virus 4 Not Detected     RSV, PCR Not Detected     Bordetella pertussis pcr Not Detected     Bordetella parapertussis PCR Not Detected     Chlamydophila pneumoniae PCR Not Detected     Mycoplasma pneumo by PCR Not Detected    Narrative:      In the setting of a positive respiratory panel with a viral infection PLUS a negative procalcitonin without other underlying concern for bacterial infection, consider observing off antibiotics or discontinuation of antibiotics and continue supportive care. If the respiratory panel is positive for atypical bacterial infection (Bordetella pertussis, Chlamydophila pneumoniae, or Mycoplasma pneumoniae), consider antibiotic de-escalation to target atypical bacterial infection.          Assessment:  Active Hospital Problems    Diagnosis  POA   • Sepsis (HCC) [A41.9]  Yes      Resolved Hospital Problems   No resolved problems to display.         Plan:  See above  Dheeraj Lynch MD   6/6/2022  14:35 EDT    Much of this encounter note is an electronic transcription/translation of spoken language to printed text. The electronic translation of spoken language may permit erroneous, or at times, nonsensical words or phrases to be inadvertently transcribed; Although I have reviewed the note for such errors, some may still exist

## 2022-06-06 NOTE — PLAN OF CARE
Goal Outcome Evaluation:  Plan of Care Reviewed With: patient           Outcome Evaluation: Pt is a 89 y.o female admitted to [facility] from rehab facility with UTI and sepsis. She has been at rehab the last few weeks, but prior to this she lives at home alone, she reports she primarly completes mobility with SPC at home. She has hx of macular degeneration and reports she is legally blind. Today she is able to perform mobility tasks CGA at rwx and mod A for dressing, set up for seated ADLs. She fatiques quickly with mobility and appears very SOB/dyspneic but O2 sats monitored throughout and maintain above 94% on RA. She would benefit from OT to increase her independence with her ADLs and increase her activity tolerance. Her goal is to dc back to rehab prior to going home.    OT wore a mask, eye protection, gloves, and completed hand hygiene.

## 2022-06-06 NOTE — CONSULTS
Comment:  Skin- stg 2 PI on coccyx  Admitted with fever and chills   On regular diet no intakes recorded  Reports good appetite  RD educated pt on good intake and sufficient protein intake to heal PI   Pt and RD discussed preferences per diverticulitis   RD to order Boost GC chocolate BID       CLINICAL NUTRITION ASSESSMENT      Reason for Assessment Pressure Injury and/or Non-Healing Wound     H&P      Past Medical History:   Diagnosis Date   • Aortic valve stenosis     s/p tissue AVR   • Back pain    • CKD (chronic kidney disease)    • Colitis due to Clostridioides difficile 01/26/2022   • Diastolic dysfunction     Grade 2 per echocardiogram 2013   • Diverticulosis    • Exertional shortness of breath    • Heart disease    • Hiatal hernia    • Hyperlipidemia    • Hypertension    • Hyperthyroidism    • Hypertriglyceridemia 05/31/2018   • Hypothyroidism    • Left ventricular hypertrophy    • Legally blind    • Liver disease    • Macular degeneration    • Mitral regurgitation    • Osteoarthritis of hip    • Pancreatitis 01/26/2022   • Paroxysmal atrial fibrillation (HCC)    • Premature ventricular contractions    • Pulmonary hypertension (HCC)    • Renal insufficiency syndrome    • Type 2 diabetes mellitus (HCC)    • Uterine cancer (HCC)        Past Surgical History:   Procedure Laterality Date   • AORTIC VALVE REPAIR/REPLACEMENT     • CATARACT EXTRACTION      1970, 1999   • ENDOSCOPY  08/15/2014    no gross lesions in stomach/duodenum, erythrematous mucosa in stomach   • HYSTERECTOMY  2007   • STERNOTOMY          Current Problems   Sepsis     89-year-old white female who is well-known to our service with history of atrial fibrillation aortic stenosis diabetes mellitus hypertension hyperlipidemia and chronic kidney disease stage IV who was recently treated for acute on chronic diastolic CHF currently at nursing home getting PT OT nursing care who also have chronic Hoffman catheter presented to [facility] [place]  "with fever chills.  Patient has been feeling poorly for last few days     Encounter Information        Nutrition/Diet History:    Visited patient at bedside who states she is feeling better today. She states her appetite has been good and she has enjoyed her meals while being here. She states she diverticulitis and knows which foods  do not agree with her. Patient denies any weight changes and but is open to an ONS to help aid her wound healing.    Typical Intake: Good intake      Food Preferences: No peas, corn or salad      Meal/Snack Patterns: UTD      Supplements: None      Typical Activity Pattern: Sedentary, Light Activity     Factors Affecting Intake: None      Tests/Procedures: X-Ray chest        Anthropometrics        Current Height  Current Weight Height: 144.8 cm (57\")  Weight: 67.1 kg (148 lb) (06/06/22 0434)       Admission Weight 148# (67kg)    Ideal Body Weight (IBW) 100# (45.5kg)    Usual Body Weight (UBW)        Trending Weight Hx     Weight Change Variable wt hx              PTA:     Wt Readings from Last 30 Encounters:   06/06/22 0434 67.1 kg (148 lb)   06/05/22 1159 73.9 kg (163 lb)   05/18/22 1339 72.6 kg (160 lb)   05/04/22 1426 72.3 kg (159 lb 6.4 oz)   04/25/22 1508 74.9 kg (165 lb 3.2 oz)   04/20/22 1414 74 kg (163 lb 3.2 oz)   04/04/22 1420 71.2 kg (157 lb)   03/23/22 1514 69.7 kg (153 lb 11.2 oz)   03/21/22 1402 68.3 kg (150 lb 9.6 oz)   03/21/22 1305 69.9 kg (154 lb)   03/09/22 0455 70.1 kg (154 lb 8 oz)   03/08/22 0543 69.4 kg (153 lb)   03/07/22 1629 68.6 kg (151 lb 3.8 oz)   03/07/22 0614 68.6 kg (151 lb 3.8 oz)   03/06/22 0404 69.5 kg (153 lb 3.2 oz)   03/05/22 1041 73.1 kg (161 lb 3.2 oz)   03/02/22 1939 85.5 kg (188 lb 7.9 oz)   03/02/22 1624 81.2 kg (179 lb)   03/02/22 1426 81.2 kg (179 lb)   02/23/22 1425 84.4 kg (186 lb)   01/26/22 1242 77.6 kg (171 lb)   01/26/22 1230 73 kg (161 lb)   12/17/21 1117 77.6 kg (171 lb)   10/19/21 1457 77.1 kg (170 lb)   06/16/21 1255 77.1 kg (170 " lb)   06/25/20 1335 72.6 kg (160 lb)   07/10/19 1422 75.8 kg (167 lb)   06/07/19 1320 77.1 kg (170 lb)   05/21/19 1108 77.1 kg (170 lb)   05/06/19 1250 74.8 kg (165 lb)   04/26/19 1320 73.5 kg (162 lb)   03/20/19 1352 74.8 kg (165 lb)   05/25/18 1440 74.4 kg (164 lb)   05/10/17 1343 74.8 kg (165 lb)   04/05/16 1139 73.5 kg (162 lb)   03/24/15 1013 76.2 kg (167 lb 15.9 oz)   05/12/14 1128 74.8 kg (164 lb 15.9 oz)   09/19/13 1226 70.8 kg (156 lb)   07/23/13 1105 78 kg (172 lb 0.1 oz)      BMI kg/m2 Body mass index is 32.03 kg/m².       Labs       Pertinent Labs reviewed   Results from last 7 days   Lab Units 06/06/22  0439 06/05/22  1218   SODIUM mmol/L 137 135*   POTASSIUM mmol/L 4.0 4.4   CHLORIDE mmol/L 104 98   CO2 mmol/L 20.0* 24.0   BUN mg/dL 22 22   CREATININE mg/dL 1.48* 1.73*   CALCIUM mg/dL 8.6 9.3   BILIRUBIN mg/dL 0.6 0.7   ALK PHOS U/L 57 68   ALT (SGPT) U/L 21 24   AST (SGOT) U/L 13 23   GLUCOSE mg/dL 139* 152*     Results from last 7 days   Lab Units 06/06/22  0439   HEMOGLOBIN g/dL 10.8*   HEMATOCRIT % 34.1   TRIGLYCERIDES mg/dL 124     COVID19   Date Value Ref Range Status   06/05/2022 Not Detected Not Detected - Ref. Range Final     Lab Results   Component Value Date    HGBA1C 6.70 (H) 03/04/2022        Medications   Scheduled Medications amLODIPine, 5 mg, Oral, Daily  [START ON 6/7/2022] cefepime, 2 g, Intravenous, Q24H  docusate sodium, 100 mg, Oral, BID  famotidine, 20 mg, Oral, BID  gabapentin, 300 mg, Oral, BID  insulin glargine, 15 Units, Subcutaneous, Nightly  insulin lispro, 0-7 Units, Subcutaneous, 4x Daily With Meals & Nightly  insulin lispro, 5 Units, Subcutaneous, TID With Meals  levothyroxine, 25 mcg, Oral, Daily  LORazepam, 0.5 mg, Oral, Nightly  metoprolol tartrate, 12.5 mg, Oral, BID  montelukast, 10 mg, Oral, Daily  polyethylene glycol, 17 g, Oral, BID AC  [START ON 6/8/2022] predniSONE, 10 mg, Oral, Q48H  rosuvastatin, 5 mg, Oral, Nightly  tamsulosin, 0.4 mg, Oral, Daily        Infusions     PRN Medications •  acetaminophen  •  bisacodyl     Physical Findings        Physical Appearance alert, obese, oriented, short stature     Edema  lower extremity  and 2+ (mild)     Gastrointestinal non-distended      Tubes/Drains Urethral catheter      Oral/Mouth Cavity Tooth/teeth missing      Skin pressure injurycoccyx stg 2      NFPE not applicable     --  Current Nutrition Orders & Evaluation of Intake       Oral Nutrition     Food Allergies NKFA   Current PO Diet Diet Regular   Supplement None    PO Evaluation     Trending % PO Intake None documented        --  Nutritional Risk Screening       MST SCORE     0     Nutrition Diagnosis        Nutrition Dx Problem 1 Increased Nutrient Needs    Medical Diagnosis    Other (comment)    Comment: nonhealing wound        Intervention Goal        Intervention Goal(s) Maintain nutrition status, Nutrition support treatment, Improved nutrition related labs, Disease management/therapy, Tolerate PO  and Maintain intake     Nutrition Intervention        RD Action Encourage intake, Supplement provided, Follow Tx Progress and Care plan reviewed     Nutrition Prescription        Diet Prescription Regular    Supplement Prescription Boost Glucose Control BID    --  Monitor/Evaluation        Monitor Per protocol, I&O, PO intake, Supplement intake, Pertinent labs, Weight, Skin status, GI status, Symptoms, POC/GOC     RD to follow per protocol.      Electronically signed by:  EVAN CARTER RD  06/06/22 16:18 EDT

## 2022-06-06 NOTE — PLAN OF CARE
Goal Outcome Evaluation:  Plan of Care Reviewed With: patient           Outcome Evaluation: Pt is a 89 yo female who presents from subacute rehab with UTI, sepsis; PMH of macular degeneration, afib. Pt states she was ambulating up to 200' with rwx and assist x1 at rehab; at baseline, pt lives alone and is independent with mobility. Upon exam, pt demonstrates generalized weakness, impaired balance, and decreased endurance limiting mobility. Pt required assist x1 and use of rwx to ambulate in hallway. Increased SOA noted during activity; however, sats were stable during activity on room air. Pt will continue to benefit from PT to address impairments and increase independence with mobility. Recommend return to SNF for continued rehab prior to DC home alone.

## 2022-06-06 NOTE — PROGRESS NOTES
"Daily progress note    Chief complaint  Doing little better  No new complaints  Still very weak  No more fever chills  Denies cough or shortness of breath    History of present illness  89-year-old white female who is well-known to our service with history of atrial fibrillation aortic stenosis diabetes mellitus hypertension hyperlipidemia and chronic kidney disease stage IV who was recently treated for acute on chronic diastolic CHF currently at nursing home getting PT  nursing care who also have chronic Hoffman catheter presented to [facility] with fever chills started this morning.  Patient has been feeling poorly for last few days.  Patient denies any chest pain increase shortness of breath abdominal pain diarrhea but does have nausea vomiting.  Patient work-up in ER revealed acute UTI with sepsis admit for management.  Patient is DNR per her wishes.     REVIEW OF SYSTEMS  Constitutional: Positive for chills, fatigue and fever.   HENT: Negative for sore throat.    Eyes: Negative.    Respiratory: Negative for cough and shortness of breath.    Cardiovascular: Negative for chest pain.   Gastrointestinal: Positive for vomiting. Negative for abdominal pain and diarrhea.   Genitourinary: Negative for dysuria.   Musculoskeletal: Negative for neck pain.   Skin: Negative for rash.   Allergic/Immunologic: Negative.    Neurological: Positive for weakness. Negative for numbness and headaches.   Hematological: Negative.    Psychiatric/Behavioral: Negative.    All other systems reviewed and are negative.      PHYSICAL EXAM  Blood pressure 132/55, pulse 72, temperature 97.8 °F (36.6 °C), temperature source Oral, resp. rate 16, height 144.8 cm (57\"), weight 67.1 kg (148 lb), SpO2 94 %.    Constitutional:       General: She is not in acute distress.  HENT:      Head: Normocephalic and atraumatic.   Eyes:      Pupils: Pupils are equal, round, and reactive to light.   Cardiovascular:      Rate and Rhythm: Normal rate " and regular rhythm.      Heart sounds: Normal heart sounds.   Pulmonary:      Effort: Pulmonary effort is normal. No respiratory distress.      Breath sounds: Normal breath sounds.   Abdominal:      Palpations: Abdomen is soft.      Tenderness: There is no abdominal tenderness. There is no guarding or rebound.   Musculoskeletal:         General: Normal range of motion.      Cervical back: Normal range of motion and neck supple.   Skin:     General: Skin is warm and dry.      Findings: No rash.   Neurological:      Mental Status: She is alert and oriented to person, place, and time.      Sensory: Sensation is intact. No sensory deficit.      Motor: No weakness.   Psychiatric:         Mood and Affect: Mood and affect normal.      LAB RESULTS  Lab Results (last 24 hours)     Procedure Component Value Units Date/Time    Blood Culture - Blood, Arm, Left [[id number]]  (Normal) Collected: 06/05/22 1311    Specimen: Blood from Arm, Left Updated: 06/06/22 1318     Blood Culture No growth at 24 hours    Blood Culture - Blood, Arm, Left [[id number]]  (Normal) Collected: 06/05/22 1218    Specimen: Blood from Arm, Left Updated: 06/06/22 1233     Blood Culture No growth at 24 hours    Urine Culture - Urine, Urine, Catheter [[id number]]  (Abnormal) Collected: 06/05/22 1211    Specimen: Urine, Catheter Updated: 06/06/22 1206     Urine Culture >100,000 CFU/mL Pseudomonas species      50,000 CFU/mL Gram Negative Bacilli    Narrative:      Colonization of the urinary tract without infection is common. Treatment is discouraged unless the patient is symptomatic, pregnant, or undergoing an invasive urologic procedure.  Colonization of the urinary tract without infection is common. Treatment is discouraged unless the patient is symptomatic, pregnant, or undergoing an invasive urologic procedure.    POC Glucose Once [[id number]]  (Abnormal) Collected: 06/06/22 1134    Specimen: Blood Updated: 06/06/22 1137     Glucose 339 mg/dL      Comment:  Meter: [id number] : [id number] [place] CNA       POC Glucose Once [[id number]]  (Normal) Collected: 06/06/22 0637    Specimen: Blood Updated: 06/06/22 0641     Glucose 124 mg/dL      Comment: Meter: [id number] : [id number] Ryan JOHNSON       Vitamin B12 [[id number]]  (Normal) Collected: 06/06/22 0439    Specimen: Blood Updated: 06/06/22 0545     Vitamin B-12 899 pg/mL     Narrative:      Results may be falsely increased if patient taking Biotin.      BNP [[id number]]  (Abnormal) Collected: 06/06/22 0439    Specimen: Blood Updated: 06/06/22 0537     proBNP 4,713.0 pg/mL     Narrative:      Among patients with dyspnea, NT-proBNP is highly sensitive for the detection of acute congestive heart failure. In addition NT-proBNP of <300 pg/ml effectively rules out acute congestive heart failure with 99% negative predictive value.    Results may be falsely decreased if patient taking Biotin.      TSH [[id number]]  (Normal) Collected: 06/06/22 0439    Specimen: Blood Updated: 06/06/22 0537     TSH 0.836 uIU/mL     Comprehensive Metabolic Panel [[id number]]  (Abnormal) Collected: 06/06/22 0439    Specimen: Blood Updated: 06/06/22 0532     Glucose 139 mg/dL      BUN 22 mg/dL      Creatinine 1.48 mg/dL      Sodium 137 mmol/L      Potassium 4.0 mmol/L      Chloride 104 mmol/L      CO2 20.0 mmol/L      Calcium 8.6 mg/dL      Total Protein 5.6 g/dL      Albumin 3.10 g/dL      ALT (SGPT) 21 U/L      AST (SGOT) 13 U/L      Alkaline Phosphatase 57 U/L      Total Bilirubin 0.6 mg/dL      Globulin 2.5 gm/dL      A/G Ratio 1.2 g/dL      BUN/Creatinine Ratio 14.9     Anion Gap 13.0 mmol/L      eGFR 33.3 mL/min/1.73      Comment: National Kidney Foundation and American Society of Nephrology (ASN) Task Force recommended calculation based on the Chronic Kidney Disease Epidemiology Collaboration (CKD-EPI) equation refit without adjustment for race.       Narrative:      GFR Normal >60  Chronic Kidney Disease <60  Kidney Failure  <15      Lipid Panel [[id number]] Collected: 06/06/22 0439    Specimen: Blood Updated: 06/06/22 0531     Total Cholesterol 101 mg/dL      Triglycerides 124 mg/dL      HDL Cholesterol 41 mg/dL      LDL Cholesterol  38 mg/dL      VLDL Cholesterol 22 mg/dL      LDL/HDL Ratio 0.86    Narrative:      Cholesterol Reference Ranges  (U.S. Department of Health and Human Services ATP III Classifications)    Desirable          <200 mg/dL  Borderline High    200-239 mg/dL  High Risk          >240 mg/dL      Triglyceride Reference Ranges  (U.S. Department of Health and Human Services ATP III Classifications)    Normal           <150 mg/dL  Borderline High  150-199 mg/dL  High             200-499 mg/dL  Very High        >500 mg/dL    HDL Reference Ranges  (U.S. Department of Health and Human Services ATP III Classifications)    Low     <40 mg/dl (major risk factor for CHD)  High    >60 mg/dl ('negative' risk factor for CHD)        LDL Reference Ranges  (U.S. Department of Health and Human Services ATP III Classifications)    Optimal          <100 mg/dL  Near Optimal     100-129 mg/dL  Borderline High  130-159 mg/dL  High             160-189 mg/dL  Very High        >189 mg/dL    CBC & Differential [[id number]]  (Abnormal) Collected: 06/06/22 0439    Specimen: Blood Updated: 06/06/22 0511    Narrative:      The following orders were created for panel order CBC & Differential.  Procedure                               Abnormality         Status                     ---------                               -----------         ------                     CBC Auto Differential[[id number]]        Abnormal            Final result                 Please view results for these tests on the individual orders.    CBC Auto Differential [[id number]]  (Abnormal) Collected: 06/06/22 0439    Specimen: Blood Updated: 06/06/22 0511     WBC 7.44 10*3/mm3      RBC 3.72 10*6/mm3      Hemoglobin 10.8 g/dL      Hematocrit 34.1 %      MCV 91.7 fL      MCH 29.0  pg      MCHC 31.7 g/dL      RDW 14.8 %      RDW-SD 50.2 fl      MPV 12.1 fL      Platelets 64 10*3/mm3      Neutrophil % 76.1 %      Lymphocyte % 14.9 %      Monocyte % 5.6 %      Eosinophil % 2.7 %      Basophil % 0.3 %      Immature Grans % 0.4 %      Neutrophils, Absolute 5.66 10*3/mm3      Lymphocytes, Absolute 1.11 10*3/mm3      Monocytes, Absolute 0.42 10*3/mm3      Eosinophils, Absolute 0.20 10*3/mm3      Basophils, Absolute 0.02 10*3/mm3      Immature Grans, Absolute 0.03 10*3/mm3      nRBC 0.0 /100 WBC     POC Glucose Once [[id number]]  (Abnormal) Collected: 06/05/22 2051    Specimen: Blood Updated: 06/05/22 2056     Glucose 227 mg/dL      Comment: Meter: [id number] : [id number] Ryan JOHNSON       POC Glucose Once [[id number]]  (Abnormal) Collected: 06/05/22 1806    Specimen: Blood Updated: 06/05/22 1807     Glucose 220 mg/dL      Comment: RN Notified R and V Meter: [id number] : [id number] Luz Moran RN       STAT Lactic Acid, Reflex [[id number]]  (Normal) Collected: 06/05/22 1517    Specimen: Blood Updated: 06/05/22 1554     Lactate 1.7 mmol/L         Imaging Results (Last 24 Hours)     ** No results found for the last 24 hours. **             ECG 12 Lead  Component   Ref Range & Units 12:08 3 mo ago   QT Interval   ms 326 P  449    Resulting Agency  ECG  ECG             HEART RATE= 136  bpm  RR Interval= 441  ms  PA Interval=   ms  P Horizontal Axis=   deg  P Front Axis=   deg  QRSD Interval= 91  ms  QT Interval= 326  ms  QRS Axis= 37  deg  T Wave Axis= 164  deg  - ABNORMAL ECG -  Atrial fibrillation  Repolarization abnormality, prob rate related             Current Facility-Administered Medications:   •  acetaminophen (TYLENOL) tablet 500 mg, 500 mg, Oral, Q6H PRN, Mango Arnold MD, 500 mg at 06/05/22 2057  •  amLODIPine (NORVASC) tablet 5 mg, 5 mg, Oral, Daily, Mango Arnold MD, 5 mg at 06/06/22 0814  •  cefTRIAXone (ROCEPHIN) 1 g in sodium chloride 0.9 % 100 mL IVPB-VTB, 1 g, Intravenous,  Q24H, Kadeem Arnold MD  •  famotidine (PEPCID) tablet 20 mg, 20 mg, Oral, BID, Kadeem Arnold MD, 20 mg at 06/06/22 0814  •  gabapentin (NEURONTIN) capsule 300 mg, 300 mg, Oral, BID, Kadeem Arnold MD, 300 mg at 06/06/22 0814  •  insulin glargine (LANTUS, SEMGLEE) injection 15 Units, 15 Units, Subcutaneous, Nightly, Kadeem Arnold MD  •  insulin lispro (ADMELOG) injection 0-7 Units, 0-7 Units, Subcutaneous, 4x Daily With Meals & Nightly, Kadeem Arnold MD, 5 Units at 06/06/22 1154  •  insulin lispro (ADMELOG) injection 5 Units, 5 Units, Subcutaneous, TID With Meals, Kadeem Arnold MD, 5 Units at 06/06/22 1153  •  levothyroxine (SYNTHROID, LEVOTHROID) tablet 25 mcg, 25 mcg, Oral, Daily, Kadeem Arnold MD, 25 mcg at 06/06/22 0813  •  LORazepam (ATIVAN) tablet 0.5 mg, 0.5 mg, Oral, Q6H PRN, Kadeem Arnold MD  •  metoprolol tartrate (LOPRESSOR) tablet 12.5 mg, 12.5 mg, Oral, BID, Kadeem Arnold MD, 12.5 mg at 06/06/22 0813  •  montelukast (SINGULAIR) tablet 10 mg, 10 mg, Oral, Daily, Kadeem Arnold MD, 10 mg at 06/06/22 0814  •  predniSONE (DELTASONE) tablet 10 mg, 10 mg, Oral, Daily, Kadeem Arnold MD, 10 mg at 06/06/22 0814  •  rosuvastatin (CRESTOR) tablet 5 mg, 5 mg, Oral, Nightly, Kadeem Arnold MD  •  tamsulosin (FLOMAX) 24 hr capsule 0.4 mg, 0.4 mg, Oral, Daily, Kadeem Arnold MD, 0.4 mg at 06/06/22 0814  •  vitamin B-12 (CYANOCOBALAMIN) tablet 1,000 mcg, 1,000 mcg, Oral, Daily, Kadeem Arnold MD, 1,000 mcg at 06/06/22 0814    ASSESSMENT  Acute Pseudomonas UTI with sepsis  Paroxysmal atrial fibrillation  Chronic diastolic CHF  Diabetes mellitus  Hypertension  Hyperlipidemia  Aortic stenosis s/p AVR  Pulmonary hypertension  Chronic indwelling Hoffman catheter  Gastroesophageal reflux disease    PLAN  CPM  IV antibiotics  Adjust nursing home medications  Stress ulcer DVT prophylaxis  Supportive care  DNR  PT/OT  Discussed with son and nursing staff  Follow closely further recommendation current hospital course    KADEEM ARNOLD MD

## 2022-06-06 NOTE — DISCHARGE PLACEMENT REQUEST
"Helena Carmen (89 y.o. Female)             Date of Birth   02/20/1931    Social Security Number       Address   [address] [place] [address] [place] [address]    Home Phone   [phone]    MRN   [id number]       Church   [employer]    Marital Status                               Admission Date   6/5/22    Admission Type   Emergency    Admitting Provider   Mango Arnold MD    Attending Provider   Mango Arnold MD    Department, Room/Bed   [address], S419/1       Discharge Date       Discharge Disposition       Discharge Destination                               Attending Provider: Mango Arnold MD    Allergies: Cephalexin, Erythromycin, Penicillins, Sulfa Antibiotics    Isolation: None   Infection: None   Code Status: No CPR   Advance Care Planning Activity    Ht: 144.8 cm (57\")   Wt: 67.1 kg (148 lb)    Admission Cmt: None   Principal Problem: None                Active Insurance as of 6/5/2022     Primary Coverage     Payor Plan Insurance Group Employer/Plan Group    MEDICARE MEDICARE A & B      Payor Plan Address Payor Plan Phone Number Payor Plan Fax Number Effective Dates    [address] [phone]  2/1/1996 - None Entered    [facility] [place] [address]       Subscriber Name Subscriber Birth Date Member ID       HELENA CARMEN 2/20/1931 [id number]           Secondary Coverage     Payor Plan Insurance Group Employer/Plan Group     FOR LIFE  FOR LIFE  SUP       Payor Plan Address Payor Plan Phone Number Payor Plan Fax Number Effective Dates    [address] [phone]  4/5/2016 - None Entered    [facility] [address]       Subscriber Name Subscriber Birth Date Member ID       HELENA CARMEN 2/20/1931 [id number]                 Emergency Contacts      (Rel.) Home Phone Work Phone Mobile Phone    Brandon carmen (Son) [phone] -- --    Radha Hoyos (Daughter) [phone] -- [phone]    BeronicaMargaret (Daughter) [phone] -- [phone] "

## 2022-06-06 NOTE — THERAPY EVALUATION
Patient Name: Helena Carmen  : 1931    MRN: [id number]                              Today's Date: 2022       Admit Date: 2022    Visit Dx:     ICD-10-CM ICD-9-CM   1. Sepsis, due to unspecified organism, unspecified whether acute organ dysfunction present (HCC)  A41.9 038.9     995.91   2. Acute UTI  N39.0 599.0   3. Paroxysmal atrial fibrillation (HCC)  I48.0 427.31     Patient Active Problem List   Diagnosis   • Aortic valve stenosis   • Fatigue   • MI (mitral incompetence)   • PVC (premature ventricular contraction)   • Legally blind   • Essential hypertension   • Hypertriglyceridemia   • Pulmonary hypertension (HCC)   • Paroxysmal atrial fibrillation (HCC)   • Anxiety   • Chronic kidney disease   • Chronic pain disorder   • Degeneration of lumbar intervertebral disc   • Diabetes mellitus (HCC)   • Esophageal reflux   • Gastroparesis   • Hiatal hernia   • Iatrogenic hypothyroidism   • Long term (current) use of opiate analgesic   • Macular degeneration   • Malignant neoplasm of uterus (HCC)   • Osteoarthritis of knee   • Peripheral nerve disease   • Secondary hyperparathyroidism (HCC)   • Thrombocytopenia (HCC)   • Chronic heart failure with preserved ejection fraction (HCC)   • Other cirrhosis of liver (HCC)   • Hypothyroidism   • Nonrheumatic tricuspid valve regurgitation   • Sepsis (HCC)     Past Medical History:   Diagnosis Date   • Aortic valve stenosis     s/p tissue AVR   • Back pain    • CKD (chronic kidney disease)    • Colitis due to Clostridioides difficile 2022   • Diastolic dysfunction     Grade 2 per echocardiogram    • Diverticulosis    • Exertional shortness of breath    • Heart disease    • Hiatal hernia    • Hyperlipidemia    • Hypertension    • Hyperthyroidism    • Hypertriglyceridemia 2018   • Hypothyroidism    • Left ventricular hypertrophy    • Legally blind    • Liver disease    • Macular degeneration    • Mitral regurgitation    • Osteoarthritis of hip    •  Pancreatitis 01/26/2022   • Paroxysmal atrial fibrillation (HCC)    • Premature ventricular contractions    • Pulmonary hypertension (HCC)    • Renal insufficiency syndrome    • Type 2 diabetes mellitus (HCC)    • Uterine cancer (HCC)      Past Surgical History:   Procedure Laterality Date   • AORTIC VALVE REPAIR/REPLACEMENT     • CATARACT EXTRACTION      1970, 1999   • ENDOSCOPY  08/15/2014    no gross lesions in stomach/duodenum, erythrematous mucosa in stomach   • HYSTERECTOMY  2007   • STERNOTOMY        General Information     Row Name 06/06/22 1007          OT Time and Intention    Document Type evaluation  -     Mode of Treatment physical therapy;co-treatment;occupational therapy  -     Row Name 06/06/22 1007          General Information    Patient Profile Reviewed yes  -     Prior Level of Function --  pt admitted from short term rehab following hospitalization in March, prior to recent rehab stay she lives at home and is independent with ADLs and has family assist her with her shopping needs.  -     Existing Precautions/Restrictions fall  -[facility] Name 06/06/22 1007          Occupational Profile    Environmental Supports and Barriers (Occupational Profile) Pt reports at home she has a shower chair, grab bars, raised commode, SPC, and has a rwx but has not used yet  -[facility] Name 06/06/22 1007          Cognition    Orientation Status (Cognition) oriented x 3  -[facility] Name 06/06/22 1007          Safety Issues, Functional Mobility    Impairments Affecting Function (Mobility) balance;endurance/activity tolerance;shortness of breath;strength  -           User Key  (r) = Recorded By, (t) = Taken By, (c) = Cosigned By    Initials Name Provider Type     Ngoc Nichols OT Occupational Therapist                 Mobility/ADL's     Row Name 06/06/22 1009          Bed Mobility    Supine-Sit [place] (Bed Mobility) standby assist  -[facility] Name 06/06/22 1009          Transfers    Sit-Stand  [place] (Transfers) contact guard;verbal cues  -[facility] Name 06/06/22 1009          Sit-Stand Transfer    Assistive Device (Sit-Stand Transfers) walker, front-wheeled  -[facility] Name 06/06/22 1009          Functional Mobility    Functional Mobility- Ind. Level contact guard assist  -     Functional Mobility- Device walker, front-wheeled  -     Functional Mobility- Comment pt able to perform functional mobility in room for ADLs, forward flexed posture noted on rwx, she appears very fatiqued and SOB after completing.  -[facility] Name 06/06/22 1009          Activities of Daily Living    BADL Assessment/Intervention lower body dressing;grooming;toileting  -[facility] Name 06/06/22 1009          Lower Body Dressing Assessment/Training    [place] Level (Lower Body Dressing) lower body dressing skills;don;socks;moderate assist (50% patient effort);doff  -     Position (Lower Body Dressing) edge of bed sitting  -     Comment, (Lower Body Dressing) extra time and fatique noted with attempt  -[facility] Name 06/06/22 1009          Grooming Assessment/Training    [place] Level (Grooming) grooming skills;hair care, combing/brushing;wash face, hands;set up  -     Position (Grooming) supported sitting  -[facility] Name 06/06/22 1009          Toileting Assessment/Training    Comment, (Toileting) pt with indwelling cathether since March  -           User Key  (r) = Recorded By, (t) = Taken By, (c) = Cosigned By    Initials Name Provider Type     Ngoc Nichols OT Occupational Therapist               Obj/Interventions     [facility] Name 06/06/22 1011          Range of Motion Comprehensive    General Range of Motion bilateral upper extremity ROM WFL  -[facility] Name 06/06/22 1011          Strength Comprehensive (MMT)    Comment, General Manual Muscle Testing (MMT) Assessment BUE 4/5  -[facility] Name 06/06/22 1011          Motor Skills    Motor Skills functional endurance  -     Functional  Endurance fair -, limited engagement in full ADL 2/2 SOB and requires sitting break/rest after engagement shortly after getting out of bed  -[facility] Name 06/06/22 1011          Balance    Static Sitting Balance standby assist  -SM     Dynamic Sitting Balance contact guard  -SM     Position, Sitting Balance sitting edge of bed  -SM     Static Standing Balance contact guard  -SM     Position/Device Used, Standing Balance supported;walker, rolling  -SM           User Key  (r) = Recorded By, (t) = Taken By, (c) = Cosigned By    Initials Name Provider Type    SM Ngoc Nichols, OT Occupational Therapist               Goals/Plan     Row Name 06/06/22 1016          Transfer Goal 1 (OT)    Activity/Assistive Device (Transfer Goal 1, OT) transfers, all;toilet  -SM     [place] Level/Cues Needed (Transfer Goal 1, OT) supervision required  -SM     Time Frame (Transfer Goal 1, OT) short term goal (STG);2 weeks  -SM     Progress/Outcome (Transfer Goal 1, OT) goal ongoing  -SM     Row Name 06/06/22 1016          Bathing Goal 1 (OT)    Activity/Device (Bathing Goal 1, OT) bathing skills, all  -SM     [place] Level/Cues Needed (Bathing Goal 1, OT) supervision required  -SM     Time Frame (Bathing Goal 1, OT) short term goal (STG);2 weeks  -SM     Progress/Outcomes (Bathing Goal 1, OT) goal ongoing  -SM     Row Name 06/06/22 1016          Dressing Goal 1 (OT)    Activity/Device (Dressing Goal 1, OT) dressing skills, all  -SM     [place]/Cues Needed (Dressing Goal 1, OT) supervision required  -SM     Time Frame (Dressing Goal 1, OT) short term goal (STG);2 weeks  -SM     Progress/Outcome (Dressing Goal 1, OT) goal ongoing  -SM     Row Name 06/06/22 1016          Grooming Goal 1 (OT)    Activity/Device (Grooming Goal 1, OT) grooming skills, all  -SM     [place] (Grooming Goal 1, OT) supervision required  -SM     Time Frame (Grooming Goal 1, OT) short term goal (STG);2 weeks  -SM     Strategies/Barriers  (Grooming Goal 1, OT) in standing position  -SM     Progress/Outcome (Grooming Goal 1, OT) goal ongoing  -SM     Row Name 06/06/22 1016          Problem Specific Goal 1 (OT)    Problem Specific Goal 1 (OT) Pt to increase functional endurance to good -, and be able to tolerate engagement in ADL routine < 25 minutes with 1 rest break or less.  -SM     Time Frame (Problem Specific Goal 1, OT) short term goal (STG);2 weeks  -SM     Progress/Outcome (Problem Specific Goal 1, OT) goal ongoing  -SM     Row Name 06/06/22 1016          Therapy Assessment/Plan (OT)    Planned Therapy Interventions (OT) activity tolerance training;adaptive equipment training;BADL retraining;functional balance retraining;occupation/activity based interventions;patient/caregiver education/training;transfer/mobility retraining;strengthening exercise  -           User Key  (r) = Recorded By, (t) = Taken By, (c) = Cosigned By    Initials Name Provider Type     Ngoc Nichols, OT Occupational Therapist               Clinical Impression     Row Name 06/06/22 1012          Pain Assessment    Pretreatment Pain Rating 0/10 - no pain  -SM     Posttreatment Pain Rating 0/10 - no pain  -SM     Row Name 06/06/22 1012          Plan of Care Review    Plan of Care Reviewed With patient  -SM     Outcome Evaluation Pt is a 89 y.o female admitted to [facility] from rehab facility with UTI and sepsis. She has been at rehab the last few weeks, but prior to this she lives at home alone, she reports she primarly completes mobility with SPC at home. She has hx of macular degeneration and reports she is legally blind. Today she is able to perform mobility tasks CGA at rwx and mod A for dressing, set up for seated ADLs. She fatiques quickly with mobility and appears very SOB/dyspneic but O2 sats monitored throughout and maintain above 94% on RA. She would benefit from OT to increase her independence with her ADLs and increase her activity tolerance. Her goal is to dc  back to rehab prior to going home.  -     Row Name 06/06/22 1012          Therapy Assessment/Plan (OT)    Rehab Potential (OT) good, to achieve stated therapy goals  -     Criteria for Skilled Therapeutic Interventions Met (OT) yes;skilled treatment is necessary  -     Therapy Frequency (OT) 5 times/wk  -     Row Name 06/06/22 1012          Therapy Plan Review/Discharge Plan (OT)    Anticipated Discharge Disposition (OT) [facility] nursing facility  -     Row Name 06/06/22 1012          Vital Signs    Pre SpO2 (%) 100  -SM     O2 Delivery Pre Treatment supplemental O2  -SM     Intra SpO2 (%) 94  -SM     O2 Delivery Intra Treatment room air  -SM     Post SpO2 (%) 96  -SM     O2 Delivery Post Treatment room air  -SM     Pre Patient Position Supine  -SM     Intra Patient Position Sitting  -     Post Patient Position Sitting  -     Row Name 06/06/22 1012          Positioning and Restraints    Pre-Treatment Position in bed  -SM     Post Treatment Position chair  -SM     In Chair reclined;call light within reach;encouraged to call for assist;exit alarm on;notified ns  -           User Key  (r) = Recorded By, (t) = Taken By, (c) = Cosigned By    Initials Name Provider Type    SM Ngoc Nichols, OT Occupational Therapist               Outcome Measures     Row Name 06/06/22 1017          How much help from another is currently needed...    Putting on and taking off regular lower body clothing? 2  -SM     Bathing (including washing, rinsing, and drying) 2  -SM     Toileting (which includes using toilet bed pan or urinal) 2  -SM     Putting on and taking off regular upper body clothing 3  -SM     Taking care of personal grooming (such as brushing teeth) 3  -SM     Eating meals 4  -SM     AM-PAC 6 Clicks Score (OT) 16  -SM     Row Name 06/06/22 0930          How much help from another person do you currently need...    Turning from your back to your side while in flat bed without using bedrails? 3  -EE      Moving from lying on back to sitting on the side of a flat bed without bedrails? 3  -EE     Moving to and from a bed to a chair (including a wheelchair)? 3  -EE     Standing up from a chair using your arms (e.g., wheelchair, bedside chair)? 3  -EE     Climbing 3-5 steps with a railing? 2  -EE     To walk in hospital room? 3  -EE     AM-PAC 6 Clicks Score (PT) 17  -EE     Highest level of mobility 5 --> Static standing  -EE     Row Name 06/06/22 1017 06/06/22 0930       Functional Assessment    Outcome Measure Options AM-PAC 6 Clicks Daily Activity (OT)  - AM-PAC 6 Clicks Basic Mobility (PT)  -EE          User Key  (r) = Recorded By, (t) = Taken By, (c) = Cosigned By    Initials Name Provider Type    EE Gena Maki, PT Physical Therapist    Ngoc Lai, OT Occupational Therapist                Occupational Therapy Education                 Title: PT OT SLP Therapies (In Progress)     Topic: Occupational Therapy (In Progress)     Point: ADL training (Done)     Description:   Instruct learner(s) on proper safety adaptation and remediation techniques during self care or transfers.   Instruct in proper use of assistive devices.              Learning Progress Summary           Patient Acceptance, E, VU by  at 6/6/2022 1018    Comment: OT goals and POC/dc recommendations                   Point: Home exercise program (Not Started)     Description:   Instruct learner(s) on appropriate technique for monitoring, assisting and/or progressing therapeutic exercises/activities.              Learner Progress:  Not documented in this visit.          Point: Precautions (Not Started)     Description:   Instruct learner(s) on prescribed precautions during self-care and functional transfers.              Learner Progress:  Not documented in this visit.          Point: Body mechanics (Not Started)     Description:   Instruct learner(s) on proper positioning and spine alignment during self-care, functional mobility  activities and/or exercises.              Learner Progress:  Not documented in this visit.                      User Key     Initials Effective Dates Name Provider Type Discipline     04/02/20 -  Ngoc Nichols OT Occupational Therapist OT              OT Recommendation and Plan  Planned Therapy Interventions (OT): activity tolerance training, adaptive equipment training, BADL retraining, functional balance retraining, occupation/activity based interventions, patient/caregiver education/training, transfer/mobility retraining, strengthening exercise  Therapy Frequency (OT): 5 times/wk  Plan of Care Review  Plan of Care Reviewed With: patient  Outcome Evaluation: Pt is a 89 y.o female admitted to [facility] from rehab facility with UTI and sepsis. She has been at rehab the last few weeks, but prior to this she lives at home alone, she reports she primarly completes mobility with SPC at home. She has hx of macular degeneration and reports she is legally blind. Today she is able to perform mobility tasks CGA at rwx and mod A for dressing, set up for seated ADLs. She fatiques quickly with mobility and appears very SOB/dyspneic but O2 sats monitored throughout and maintain above 94% on RA. She would benefit from OT to increase her independence with her ADLs and increase her activity tolerance. Her goal is to dc back to rehab prior to going home.     Time Calculation:    Time Calculation- OT     Row Name 06/06/22 1018             Time Calculation- OT    OT Start Time 0847  -      OT Stop Time 0903  -      OT Time Calculation (min) 16 min  -      Total Timed Code Minutes- OT 8 minute(s)  -SM      OT Received On 06/06/22  -      OT - Next Appointment 06/07/22  -      OT Goal Re-Cert Due Date 06/20/22  -              Timed Charges    68420 - OT Therapeutic Activity Minutes 8  -SM              Untimed Charges    OT Eval/Re-eval Minutes 8  -SM              Total Minutes    Timed Charges Total Minutes 8  -SM       Untimed Charges Total Minutes 8  -SM       Total Minutes 16  -SM            User Key  (r) = Recorded By, (t) = Taken By, (c) = Cosigned By    Initials Name Provider Type     Ngoc Nichols OT Occupational Therapist              Therapy Charges for Today     Code Description Service Date Service Provider Modifiers Qty    [id number]  OT THERAPEUTIC ACT EA 15 MIN 6/6/2022 Ngoc Nichols OT GO 1    [id number]  OT EVAL MOD COMPLEXITY 2 6/6/2022 Ngoc Nichols OT GO 1               Ngoc Nichols OT  6/6/2022

## 2022-06-06 NOTE — THERAPY EVALUATION
Patient Name: Helena Carmen  : 1931    MRN: [id number]                              Today's Date: 2022       Admit Date: 2022    Visit Dx:     ICD-10-CM ICD-9-CM   1. Sepsis, due to unspecified organism, unspecified whether acute organ dysfunction present (HCC)  A41.9 038.9     995.91   2. Acute UTI  N39.0 599.0   3. Paroxysmal atrial fibrillation (HCC)  I48.0 427.31     Patient Active Problem List   Diagnosis   • Aortic valve stenosis   • Fatigue   • MI (mitral incompetence)   • PVC (premature ventricular contraction)   • Legally blind   • Essential hypertension   • Hypertriglyceridemia   • Pulmonary hypertension (HCC)   • Paroxysmal atrial fibrillation (HCC)   • Anxiety   • Chronic kidney disease   • Chronic pain disorder   • Degeneration of lumbar intervertebral disc   • Diabetes mellitus (HCC)   • Esophageal reflux   • Gastroparesis   • Hiatal hernia   • Iatrogenic hypothyroidism   • Long term (current) use of opiate analgesic   • Macular degeneration   • Malignant neoplasm of uterus (HCC)   • Osteoarthritis of knee   • Peripheral nerve disease   • Secondary hyperparathyroidism (HCC)   • Thrombocytopenia (HCC)   • Chronic heart failure with preserved ejection fraction (HCC)   • Other cirrhosis of liver (HCC)   • Hypothyroidism   • Nonrheumatic tricuspid valve regurgitation   • Sepsis (HCC)     Past Medical History:   Diagnosis Date   • Aortic valve stenosis     s/p tissue AVR   • Back pain    • CKD (chronic kidney disease)    • Colitis due to Clostridioides difficile 2022   • Diastolic dysfunction     Grade 2 per echocardiogram    • Diverticulosis    • Exertional shortness of breath    • Heart disease    • Hiatal hernia    • Hyperlipidemia    • Hypertension    • Hyperthyroidism    • Hypertriglyceridemia 2018   • Hypothyroidism    • Left ventricular hypertrophy    • Legally blind    • Liver disease    • Macular degeneration    • Mitral regurgitation    • Osteoarthritis of hip    •  Pancreatitis 01/26/2022   • Paroxysmal atrial fibrillation (HCC)    • Premature ventricular contractions    • Pulmonary hypertension (HCC)    • Renal insufficiency syndrome    • Type 2 diabetes mellitus (HCC)    • Uterine cancer (HCC)      Past Surgical History:   Procedure Laterality Date   • AORTIC VALVE REPAIR/REPLACEMENT     • CATARACT EXTRACTION      1970, 1999   • ENDOSCOPY  08/15/2014    no gross lesions in stomach/duodenum, erythrematous mucosa in stomach   • HYSTERECTOMY  2007   • STERNOTOMY        General Information     Row Name 06/06/22 0924          Physical Therapy Time and Intention    Document Type evaluation  -EE     Mode of Treatment physical therapy;co-treatment;occupational therapy  -EE     Row Name 06/06/22 0924          General Information    Prior Level of Function min assist:;bed mobility;transfer;gait  admitted from [facility]; was ambulating 200' with rwx and assist x1; lives alone and is independent at baseline  -EE     Existing Precautions/Restrictions fall  -EE     Barriers to Rehab none identified  -EE     Row Name 06/06/22 0924          Living Environment    People in Home other (see comments)  admitted from SNF/subacute rehab; lives alone at baseline  -EE     Row Name 06/06/22 0924          Cognition    Orientation Status (Cognition) oriented x 3  -EE     Row Name 06/06/22 0924          Safety Issues, Functional Mobility    Impairments Affecting Function (Mobility) balance;endurance/activity tolerance;shortness of breath;strength  -EE           User Key  (r) = Recorded By, (t) = Taken By, (c) = Cosigned By    Initials Name Provider Type    EE Gena Maki PT Physical Therapist               Mobility     Row Name 06/06/22 0925          Bed Mobility    Bed Mobility supine-sit  -EE     Supine-Sit [place] (Bed Mobility) standby assist  -EE     Assistive Device (Bed Mobility) head of bed elevated;bed rails  -EE     Row Name 06/06/22 0925          Sit-Stand Transfer    Sit-Stand [place]  (Transfers) contact guard;verbal cues  -EE     Assistive Device (Sit-Stand Transfers) walker, front-wheeled  -EE     Row Name 06/06/22 0925          Gait/Stairs (Locomotion)    [place] Level (Gait) contact guard;verbal cues  -EE     Assistive Device (Gait) walker, front-wheeled  -EE     Distance in Feet (Gait) 85'  -EE     Deviations/Abnormal Patterns (Gait) paul decreased;stride length decreased  -EE     Bilateral Gait Deviations forward flexed posture;weight shift ability decreased;heel strike decreased  -EE     Comment, (Gait/Stairs) Very forward flexed posture that worsens with fatigue. Unable to correct despite cues. Increased SOA noted during ambulation; 2 standing rest breaks required; sats stable throughout activity on room air.  -EE           User Key  (r) = Recorded By, (t) = Taken By, (c) = Cosigned By    Initials Name Provider Type    EE Gena Maki, CANDY Physical Therapist               Obj/Interventions     Row Name 06/06/22 0926          Range of Motion Comprehensive    General Range of Motion bilateral lower extremity ROM WFL  -EE     Row Name 06/06/22 0926          Strength Comprehensive (MMT)    General Manual Muscle Testing (MMT) Assessment lower extremity strength deficits identified  -EE     Comment, General Manual Muscle Testing (MMT) Assessment generalized weakness; B LEs grossly 4-/5  -EE     Row Name 06/06/22 0926          Balance    Balance Assessment sitting static balance;sitting dynamic balance;standing static balance  -EE     Static Sitting Balance standby assist  -EE     Dynamic Sitting Balance contact guard  -EE     Position, Sitting Balance unsupported;sitting edge of bed  -EE     Static Standing Balance contact guard  -EE     Position/Device Used, Standing Balance supported;walker, rolling  -EE     Row Name 06/06/22 0926          Sensory Assessment (Somatosensory)    Sensory Assessment (Somatosensory) not tested  -EE           User Key  (r) = Recorded By, (t) = Taken By, (c)  = Cosigned By    Initials Name Provider Type    EE Gena Maki, PT Physical Therapist               Goals/Plan     Row Name 06/06/22 0929          Bed Mobility Goal 1 (PT)    Activity/Assistive Device (Bed Mobility Goal 1, PT) sit to supine/supine to sit  -EE     [place] Level/Cues Needed (Bed Mobility Goal 1, PT) independent  -EE     Time Frame (Bed Mobility Goal 1, PT) 1 week  -EE     Progress/Outcomes (Bed Mobility Goal 1, PT) goal ongoing  -EE     Row Name 06/06/22 0929          Transfer Goal 1 (PT)    Activity/Assistive Device (Transfer Goal 1, PT) sit-to-stand/stand-to-sit;bed-to-chair/chair-to-bed;walker, rolling  -EE     [place] Level/Cues Needed (Transfer Goal 1, PT) supervision required  -EE     Time Frame (Transfer Goal 1, PT) 1 week  -EE     Progress/Outcome (Transfer Goal 1, PT) goal ongoing  -EE     Row Name 06/06/22 0929          Gait Training Goal 1 (PT)    Activity/Assistive Device (Gait Training Goal 1, PT) gait (walking locomotion);walker, rolling  -EE     [place] Level (Gait Training Goal 1, PT) contact guard required  -EE     Distance (Gait Training Goal 1, PT) 150'  -EE     Time Frame (Gait Training Goal 1, PT) 1 week  -EE     Progress/Outcome (Gait Training Goal 1, PT) goal ongoing  -EE     Row Name 06/06/22 0929          Therapy Assessment/Plan (PT)    Planned Therapy Interventions (PT) balance training;bed mobility training;gait training;home exercise program;patient/family education;ROM (range of motion);stair training;strengthening;transfer training;postural re-education  -EE           User Key  (r) = Recorded By, (t) = Taken By, (c) = Cosigned By    Initials Name Provider Type    EE Gena Maki, PT Physical Therapist               Clinical Impression     Row Name 06/06/22 0927          Pain    Pretreatment Pain Rating 0/10 - no pain  -EE     Posttreatment Pain Rating 0/10 - no pain  -EE     Row Name 06/06/22 0927          Plan of Care Review    Plan of Care Reviewed With  patient  -EE     Outcome Evaluation Pt is a 89 yo female who presents from subacute rehab with UTI, sepsis; PMH of macular degeneration, afib. Pt states she was ambulating up to 200' with rwx and assist x1 at rehab; at baseline, pt lives alone and is independent with mobility. Upon exam, pt demonstrates generalized weakness, impaired balance, and decreased endurance limiting mobility. Pt required assist x1 and use of rwx to ambulate in hallway. Increased SOA noted during activity; however, sats were stable during activity on room air. Pt will continue to benefit from PT to address impairments and increase independence with mobility. Recommend return to SNF for continued rehab prior to DC home alone.  -EE     Row Name 06/06/22 0927          Therapy Assessment/Plan (PT)    Rehab Potential (PT) good, to achieve stated therapy goals  -EE     Criteria for Skilled Interventions Met (PT) yes;meets criteria;skilled treatment is necessary  -EE     Therapy Frequency (PT) 5 times/wk  -EE     Row Name 06/06/22 0927          Vital Signs    Pre SpO2 (%) 100  -EE     O2 Delivery Pre Treatment supplemental O2  -EE     Intra SpO2 (%) 95  -EE     O2 Delivery Intra Treatment room air  -EE     Post SpO2 (%) 97  -EE     O2 Delivery Post Treatment room air  -EE     Pre Patient Position Supine  -EE     Intra Patient Position Standing  -EE     Post Patient Position Sitting  -EE     Row Name 06/06/22 0927          Positioning and Restraints    Pre-Treatment Position in bed  -EE     Post Treatment Position chair  -EE     In Chair reclined;call light within reach;encouraged to call for assist;exit alarm on;notified nsg;legs elevated  Sena WALKER, notified that pt left on room air  -EE           User Key  (r) = Recorded By, (t) = Taken By, (c) = Cosigned By    Initials Name Provider Type    EE Gena Maki, PT Physical Therapist               Outcome Measures     Row Name 06/06/22 0994          How much help from another person do you currently  need...    Turning from your back to your side while in flat bed without using bedrails? 3  -EE     Moving from lying on back to sitting on the side of a flat bed without bedrails? 3  -EE     Moving to and from a bed to a chair (including a wheelchair)? 3  -EE     Standing up from a chair using your arms (e.g., wheelchair, bedside chair)? 3  -EE     Climbing 3-5 steps with a railing? 2  -EE     To walk in hospital room? 3  -EE     AM-PAC 6 Clicks Score (PT) 17  -EE     Highest level of mobility 5 --> Static standing  -EE     Row Name 06/06/22 0930          Functional Assessment    Outcome Measure Options AM-PAC 6 Clicks Basic Mobility (PT)  -EE           User Key  (r) = Recorded By, (t) = Taken By, (c) = Cosigned By    Initials Name Provider Type    EE Gena Maki PT Physical Therapist                             Physical Therapy Education                 Title: PT OT SLP Therapies (In Progress)     Topic: Physical Therapy (In Progress)     Point: Mobility training (Done)     Learning Progress Summary           Patient Acceptance, E, VU,NR by EE at 6/6/2022 0930                   Point: Home exercise program (Not Started)     Learner Progress:  Not documented in this visit.          Point: Body mechanics (Not Started)     Learner Progress:  Not documented in this visit.          Point: Precautions (Not Started)     Learner Progress:  Not documented in this visit.                      User Key     Initials Effective Dates Name Provider Type Discipline    EE 06/16/21 -  Gena Maki PT Physical Therapist PT              PT Recommendation and Plan  Planned Therapy Interventions (PT): balance training, bed mobility training, gait training, home exercise program, patient/family education, ROM (range of motion), stair training, strengthening, transfer training, postural re-education  Plan of Care Reviewed With: patient  Outcome Evaluation: Pt is a 89 yo female who presents from subacute rehab with UTI, sepsis; PMH of  macular degeneration, afib. Pt states she was ambulating up to 200' with rwx and assist x1 at rehab; at baseline, pt lives alone and is independent with mobility. Upon exam, pt demonstrates generalized weakness, impaired balance, and decreased endurance limiting mobility. Pt required assist x1 and use of rwx to ambulate in hallway. Increased SOA noted during activity; however, sats were stable during activity on room air. Pt will continue to benefit from PT to address impairments and increase independence with mobility. Recommend return to SNF for continued rehab prior to DC home alone.     Time Calculation:    PT Charges     Row Name 06/06/22 0930             Time Calculation    Start Time 0847  -EE      Stop Time 0903  -EE      Time Calculation (min) 16 min  -EE      PT Received On 06/06/22  -EE      PT - Next Appointment 06/07/22  -EE      PT Goal Re-Cert Due Date 06/13/22  -EE              Time Calculation- PT    Total Timed Code Minutes- PT 9 minute(s)  -EE              Timed Charges    52534 - PT Therapeutic Activity Minutes 9  -EE              Total Minutes    Timed Charges Total Minutes 9  -EE       Total Minutes 9  -EE            User Key  (r) = Recorded By, (t) = Taken By, (c) = Cosigned By    Initials Name Provider Type    EE Gena Maki, PT Physical Therapist              Therapy Charges for Today     Code Description Service Date Service Provider Modifiers Qty    [id number]  PT THERAPEUTIC ACT EA 15 MIN 6/6/2022 Gena Maki, PT GP 1    [id number]  PT EVAL MOD COMPLEXITY 2 6/6/2022 Gena Maki, PT GP 1          PT G-Codes  Outcome Measure Options: AM-PAC 6 Clicks Basic Mobility (PT)  AM-PAC 6 Clicks Score (PT): 17     Patient was intermittently wearing a face mask during this therapy encounter. Therapist used appropriate personal protective equipment including mask and gloves.  Mask used was standard procedure mask. Appropriate PPE was worn during the entire therapy session. Hand hygiene was  completed before and after therapy session. Patient is not in enhanced droplet precautions.       Gena Maki, PT  6/6/2022

## 2022-06-06 NOTE — CASE MANAGEMENT/SOCIAL WORK
Discharge Planning Assessment  [facility]     Patient Name: Helena Carmen  MRN: [id number]  Today's Date: 6/6/2022    Admit Date: 6/5/2022     Discharge Needs Assessment     Row Name 06/06/22 1132       Living Environment    People in Home alone    Current Living Arrangements home    Potentially Unsafe Housing Conditions other (see comments)  no concerns    Primary Care Provided by self    Provides Primary Care For no one    Family Caregiver if Needed child(edison), adult    Quality of Family Relationships involved;helpful;supportive    Able to Return to Prior Arrangements yes       Resource/Environmental Concerns    Resource/Environmental Concerns none       Transition Planning    Patient/Family Anticipates Transition to inpatient rehabilitation facility    Patient/Family Anticipated Services at Transition skilled nursing       Discharge Needs Assessment    Readmission Within the Last 30 Days no previous admission in last 30 days    Current Outpatient/Agency/Support Group skilled nursing facility    Equipment Currently Used at Home walker, rolling    Concerns to be Addressed discharge planning    Equipment Needed After Discharge none    Outpatient/Agency/Support Group Needs skilled nursing facility    Discharge Facility/Level of Care Needs nursing facility, skilled               Discharge Plan     Row Name 06/06/22 1139       Plan    Plan Return to [place]; will have a bed till Thursday    Plan Comments CCP met with patient at bedside. CCP role explained and discharge planning discussed. Face sheet verified and IMM noted. Patient resides alone but states she has been at [facility] for rehab. Patient states she plans on returning to [place] at discharge to continue rehab. CCP spoke with [facility]/[place]; will have a bed till Thursday. CCP will need to arrange wheelchair van. CCP will follow and assist with discharge back to [place]. Bon[facility] POPE              Continued Care and Services - Admitted Since 6/5/2022     Coordination has not been started for this encounter.     Selected Continued Care - Prior Encounters Includes selections from prior encounters from 3/7/2022 to 6/6/2022    Discharged on 3/9/2022 Admission date: 3/2/2022 - Discharge disposition: Skilled Nursing Facility (DC - External)    Destination     Service Provider Selected Services Address Phone Fax Patient Preferred    [facility] [address] [facility] [address] [phone] --                       Demographic Summary     Row Name 06/06/22 1131       General Information    Admission Type inpatient    Arrived From emergency department    Required Notices Provided Important Message from Medicare    Referral Source admission list    Reason for Consult discharge planning    Preferred Language English               Functional Status     Row Name 06/06/22 1131       Functional Status    Usual Activity Tolerance moderate       Functional Status, IADL    Medications assistive equipment    Meal Preparation assistive equipment    Housekeeping assistive equipment    Laundry assistive equipment       Mental Status Summary    Recent Changes in Mental Status/Cognitive Functioning no changes               Psychosocial    No documentation.                Abuse/Neglect    No documentation.                Legal    No documentation.                Substance Abuse    No documentation.                Patient Forms    No documentation.                   TOSHIA Campos

## 2022-06-06 NOTE — PLAN OF CARE
Goal Outcome Evaluation:  Plan of Care Reviewed With: patient        Progress: improving  Outcome Evaluation: pt from nursing home with uti and sepsis. pt admitted with jimenes, per pt was having problems voiding. pt reports using wheelchair most times, sometimes a walker. pt is blind from macular degeneration. iv abx. q2 turns and encouraged to shift weight. on 1L o2, ra is baseline.

## 2022-06-07 LAB
ANION GAP SERPL CALCULATED.3IONS-SCNC: 11.4 MMOL/L (ref 5–15)
BASOPHILS # BLD AUTO: 0.02 10*3/MM3 (ref 0–0.2)
BASOPHILS NFR BLD AUTO: 0.3 % (ref 0–1.5)
BUN SERPL-MCNC: 26 MG/DL (ref 8–23)
BUN/CREAT SERPL: 17.9 (ref 7–25)
CALCIUM SPEC-SCNC: 8.7 MG/DL (ref 8.2–9.6)
CHLORIDE SERPL-SCNC: 104 MMOL/L (ref 98–107)
CO2 SERPL-SCNC: 21.6 MMOL/L (ref 22–29)
CREAT SERPL-MCNC: 1.45 MG/DL (ref 0.57–1)
DEPRECATED RDW RBC AUTO: 45.6 FL (ref 37–54)
EGFRCR SERPLBLD CKD-EPI 2021: 34.1 ML/MIN/1.73
EOSINOPHIL # BLD AUTO: 0.12 10*3/MM3 (ref 0–0.4)
EOSINOPHIL NFR BLD AUTO: 2 % (ref 0.3–6.2)
ERYTHROCYTE [DISTWIDTH] IN BLOOD BY AUTOMATED COUNT: 14.4 % (ref 12.3–15.4)
GLUCOSE BLDC GLUCOMTR-MCNC: 204 MG/DL (ref 70–130)
GLUCOSE BLDC GLUCOMTR-MCNC: 217 MG/DL (ref 70–130)
GLUCOSE BLDC GLUCOMTR-MCNC: 229 MG/DL (ref 70–130)
GLUCOSE BLDC GLUCOMTR-MCNC: 256 MG/DL (ref 70–130)
GLUCOSE SERPL-MCNC: 229 MG/DL (ref 65–99)
HCT VFR BLD AUTO: 29.8 % (ref 34–46.6)
HGB BLD-MCNC: 9.6 G/DL (ref 12–15.9)
IMM GRANULOCYTES # BLD AUTO: 0.05 10*3/MM3 (ref 0–0.05)
IMM GRANULOCYTES NFR BLD AUTO: 0.8 % (ref 0–0.5)
LYMPHOCYTES # BLD AUTO: 1.4 10*3/MM3 (ref 0.7–3.1)
LYMPHOCYTES NFR BLD AUTO: 22.8 % (ref 19.6–45.3)
MCH RBC QN AUTO: 28.4 PG (ref 26.6–33)
MCHC RBC AUTO-ENTMCNC: 32.2 G/DL (ref 31.5–35.7)
MCV RBC AUTO: 88.2 FL (ref 79–97)
MONOCYTES # BLD AUTO: 0.5 10*3/MM3 (ref 0.1–0.9)
MONOCYTES NFR BLD AUTO: 8.2 % (ref 5–12)
NEUTROPHILS NFR BLD AUTO: 4.04 10*3/MM3 (ref 1.7–7)
NEUTROPHILS NFR BLD AUTO: 65.9 % (ref 42.7–76)
NRBC BLD AUTO-RTO: 0.2 /100 WBC (ref 0–0.2)
PLATELET # BLD AUTO: 68 10*3/MM3 (ref 140–450)
PMV BLD AUTO: 12 FL (ref 6–12)
POTASSIUM SERPL-SCNC: 4.5 MMOL/L (ref 3.5–5.2)
RBC # BLD AUTO: 3.38 10*6/MM3 (ref 3.77–5.28)
SODIUM SERPL-SCNC: 137 MMOL/L (ref 136–145)
T4 FREE SERPL-MCNC: 1.11 NG/DL (ref 0.93–1.7)
WBC NRBC COR # BLD: 6.13 10*3/MM3 (ref 3.4–10.8)

## 2022-06-07 PROCEDURE — 63710000001 INSULIN LISPRO (HUMAN) PER 5 UNITS: Performed by: HOSPITALIST

## 2022-06-07 PROCEDURE — 80048 BASIC METABOLIC PNL TOTAL CA: CPT | Performed by: HOSPITALIST

## 2022-06-07 PROCEDURE — 82962 GLUCOSE BLOOD TEST: CPT

## 2022-06-07 PROCEDURE — 85025 COMPLETE CBC W/AUTO DIFF WBC: CPT | Performed by: HOSPITALIST

## 2022-06-07 PROCEDURE — 25010000002 CEFEPIME PER 500 MG: Performed by: HOSPITALIST

## 2022-06-07 PROCEDURE — 84439 ASSAY OF FREE THYROXINE: CPT | Performed by: HOSPITALIST

## 2022-06-07 RX ORDER — INSULIN LISPRO 100 [IU]/ML
7 INJECTION, SOLUTION INTRAVENOUS; SUBCUTANEOUS
Status: DISCONTINUED | OUTPATIENT
Start: 2022-06-07 | End: 2022-06-08

## 2022-06-07 RX ADMIN — INSULIN GLARGINE-YFGN 20 UNITS: 100 INJECTION, SOLUTION SUBCUTANEOUS at 21:34

## 2022-06-07 RX ADMIN — INSULIN LISPRO 3 UNITS: 100 INJECTION, SOLUTION INTRAVENOUS; SUBCUTANEOUS at 12:23

## 2022-06-07 RX ADMIN — MONTELUKAST SODIUM 10 MG: 10 TABLET, FILM COATED ORAL at 08:57

## 2022-06-07 RX ADMIN — INSULIN LISPRO 3 UNITS: 100 INJECTION, SOLUTION INTRAVENOUS; SUBCUTANEOUS at 08:59

## 2022-06-07 RX ADMIN — INSULIN LISPRO 7 UNITS: 100 INJECTION, SOLUTION INTRAVENOUS; SUBCUTANEOUS at 17:12

## 2022-06-07 RX ADMIN — ROSUVASTATIN CALCIUM 5 MG: 5 TABLET, FILM COATED ORAL at 21:34

## 2022-06-07 RX ADMIN — METOPROLOL TARTRATE 12.5 MG: 25 TABLET, FILM COATED ORAL at 21:34

## 2022-06-07 RX ADMIN — LORAZEPAM 0.5 MG: 0.5 TABLET ORAL at 21:35

## 2022-06-07 RX ADMIN — POLYETHYLENE GLYCOL 3350 17 G: 17 POWDER, FOR SOLUTION ORAL at 06:36

## 2022-06-07 RX ADMIN — INSULIN LISPRO 5 UNITS: 100 INJECTION, SOLUTION INTRAVENOUS; SUBCUTANEOUS at 12:23

## 2022-06-07 RX ADMIN — FAMOTIDINE 20 MG: 20 TABLET ORAL at 21:34

## 2022-06-07 RX ADMIN — DOCUSATE SODIUM 100 MG: 100 CAPSULE, LIQUID FILLED ORAL at 08:58

## 2022-06-07 RX ADMIN — INSULIN LISPRO 3 UNITS: 100 INJECTION, SOLUTION INTRAVENOUS; SUBCUTANEOUS at 21:34

## 2022-06-07 RX ADMIN — DOCUSATE SODIUM 100 MG: 100 CAPSULE, LIQUID FILLED ORAL at 21:34

## 2022-06-07 RX ADMIN — AMLODIPINE BESYLATE 5 MG: 5 TABLET ORAL at 08:58

## 2022-06-07 RX ADMIN — INSULIN LISPRO 4 UNITS: 100 INJECTION, SOLUTION INTRAVENOUS; SUBCUTANEOUS at 17:12

## 2022-06-07 RX ADMIN — TAMSULOSIN HYDROCHLORIDE 0.4 MG: 0.4 CAPSULE ORAL at 08:57

## 2022-06-07 RX ADMIN — FAMOTIDINE 20 MG: 20 TABLET ORAL at 08:58

## 2022-06-07 RX ADMIN — GABAPENTIN 300 MG: 300 CAPSULE ORAL at 21:34

## 2022-06-07 RX ADMIN — LEVOTHYROXINE SODIUM 25 MCG: 0.03 TABLET ORAL at 08:58

## 2022-06-07 RX ADMIN — CEFEPIME HYDROCHLORIDE 2 G: 2 INJECTION, POWDER, FOR SOLUTION INTRAVENOUS at 15:56

## 2022-06-07 RX ADMIN — GABAPENTIN 300 MG: 300 CAPSULE ORAL at 08:58

## 2022-06-07 RX ADMIN — METOPROLOL TARTRATE 12.5 MG: 25 TABLET, FILM COATED ORAL at 08:58

## 2022-06-07 RX ADMIN — INSULIN LISPRO 5 UNITS: 100 INJECTION, SOLUTION INTRAVENOUS; SUBCUTANEOUS at 08:58

## 2022-06-07 NOTE — PLAN OF CARE
Goal Outcome Evaluation:  Plan of Care Reviewed With: patient        Progress: improving  Outcome Evaluation: pt is receiving iv abx for UTI. chronic jimenes in place. plan is to change out jimenes today. q2h turns. jimenes care complete. room air. normal sinus rhythm with frequent PACs.

## 2022-06-07 NOTE — PROGRESS NOTES
"Daily progress note    Chief complaint  Doing same  No new complaints except constipation  Still very weak  No more fever chills  Denies cough or shortness of breath    History of present illness  89-year-old white female who is well-known to our service with history of atrial fibrillation aortic stenosis diabetes mellitus hypertension hyperlipidemia and chronic kidney disease stage IV who was recently treated for acute on chronic diastolic CHF currently at nursing home getting PT OT nursing care who also have chronic Hoffman catheter presented to [facility] with fever chills started this morning.  Patient has been feeling poorly for last few days.  Patient denies any chest pain increase shortness of breath abdominal pain diarrhea but does have nausea vomiting.  Patient work-up in ER revealed acute UTI with sepsis admit for management.  Patient is DNR per her wishes.     REVIEW OF SYSTEMS  Unremarkable except generalized weakness      PHYSICAL EXAM  Blood pressure 152/70, pulse 70, temperature 98 °F (36.7 °C), temperature source Oral, resp. rate 18, height 144.8 cm (57\"), weight 67.1 kg (148 lb), SpO2 94 %.    Constitutional:       General: She is not in acute distress.  HENT:      Head: Normocephalic and atraumatic.   Eyes:      Pupils: Pupils are equal, round, and reactive to light.   Cardiovascular:      Rate and Rhythm: Normal rate and regular rhythm.      Heart sounds: Normal heart sounds.   Pulmonary:      Effort: Pulmonary effort is normal. No respiratory distress.      Breath sounds: Normal breath sounds.   Abdominal:      Palpations: Abdomen is soft.      Tenderness: There is no abdominal tenderness. There is no guarding or rebound.   Musculoskeletal:         General: Normal range of motion.      Cervical back: Normal range of motion and neck supple.   Skin:     General: Skin is warm and dry.      Findings: No rash.   Neurological:      Mental Status: She is alert and oriented to person, place, and " time.      Sensory: Sensation is intact. No sensory deficit.      Motor: No weakness.   Psychiatric:         Mood and Affect: Mood and affect normal.      LAB RESULTS  Lab Results (last 24 hours)     Procedure Component Value Units Date/Time    Blood Culture - Blood, Arm, Left [[id number]]  (Normal) Collected: 06/05/22 1311    Specimen: Blood from Arm, Left Updated: 06/07/22 1316     Blood Culture No growth at 2 days    Blood Culture - Blood, Arm, Left [[id number]]  (Normal) Collected: 06/05/22 1218    Specimen: Blood from Arm, Left Updated: 06/07/22 1230     Blood Culture No growth at 2 days    POC Glucose Once [[id number]]  (Abnormal) Collected: 06/07/22 1055    Specimen: Blood Updated: 06/07/22 1056     Glucose 229 mg/dL      Comment: Meter: [id number] : [id number] Donell JOHNSON       Urine Culture - Urine, Urine, Catheter [[id number]]  (Abnormal) Collected: 06/05/22 1211    Specimen: Urine, Catheter Updated: 06/07/22 1008     Urine Culture >100,000 CFU/mL Pseudomonas species      50,000 CFU/mL Gram Negative Bacilli    Narrative:      mics to follow  Colonization of the urinary tract without infection is common. Treatment is discouraged unless the patient is symptomatic, pregnant, or undergoing an invasive urologic procedure.  Colonization of the urinary tract without infection is common. Treatment is discouraged unless the patient is symptomatic, pregnant, or undergoing an invasive urologic procedure.    POC Glucose Once [[id number]]  (Abnormal) Collected: 06/07/22 0630    Specimen: Blood Updated: 06/07/22 0631     Glucose 217 mg/dL      Comment: Meter: [id number] : [id number] Bacilio Mclaughlin PCA       T4, Free [[id number]]  (Normal) Collected: 06/07/22 0406    Specimen: Blood Updated: 06/07/22 0512     Free T4 1.11 ng/dL     Narrative:      Results may be falsely increased if patient taking Biotin.      Basic Metabolic Panel [[id number]]  (Abnormal) Collected: 06/07/22 0406    Specimen: Blood Updated: 06/07/22  0510     Glucose 229 mg/dL      BUN 26 mg/dL      Creatinine 1.45 mg/dL      Sodium 137 mmol/L      Potassium 4.5 mmol/L      Chloride 104 mmol/L      CO2 21.6 mmol/L      Calcium 8.7 mg/dL      BUN/Creatinine Ratio 17.9     Anion Gap 11.4 mmol/L      eGFR 34.1 mL/min/1.73      Comment: National Kidney Foundation and American Society of Nephrology (ASN) Task Force recommended calculation based on the Chronic Kidney Disease Epidemiology Collaboration (CKD-EPI) equation refit without adjustment for race.       Narrative:      GFR Normal >60  Chronic Kidney Disease <60  Kidney Failure <15      CBC & Differential [[id number]]  (Abnormal) Collected: 06/07/22 0406    Specimen: Blood Updated: 06/07/22 0440    Narrative:      The following orders were created for panel order CBC & Differential.  Procedure                               Abnormality         Status                     ---------                               -----------         ------                     CBC Auto Differential[[id number]]        Abnormal            Final result                 Please view results for these tests on the individual orders.    CBC Auto Differential [[id number]]  (Abnormal) Collected: 06/07/22 0406    Specimen: Blood Updated: 06/07/22 0440     WBC 6.13 10*3/mm3      RBC 3.38 10*6/mm3      Hemoglobin 9.6 g/dL      Hematocrit 29.8 %      MCV 88.2 fL      MCH 28.4 pg      MCHC 32.2 g/dL      RDW 14.4 %      RDW-SD 45.6 fl      MPV 12.0 fL      Platelets 68 10*3/mm3      Neutrophil % 65.9 %      Lymphocyte % 22.8 %      Monocyte % 8.2 %      Eosinophil % 2.0 %      Basophil % 0.3 %      Immature Grans % 0.8 %      Neutrophils, Absolute 4.04 10*3/mm3      Lymphocytes, Absolute 1.40 10*3/mm3      Monocytes, Absolute 0.50 10*3/mm3      Eosinophils, Absolute 0.12 10*3/mm3      Basophils, Absolute 0.02 10*3/mm3      Immature Grans, Absolute 0.05 10*3/mm3      nRBC 0.2 /100 WBC     POC Glucose Once [[id number]]  (Abnormal) Collected: 06/06/22 2045     Specimen: Blood Updated: 06/06/22 2046     Glucose 346 mg/dL      Comment: Meter: [id number] : [id number] Bacilio Mclaughlin PCA       POC Glucose Once [[id number]]  (Abnormal) Collected: 06/06/22 1624    Specimen: Blood Updated: 06/06/22 1628     Glucose 382 mg/dL      Comment: Meter: [id number] : [id number] [facility]           Imaging Results (Last 24 Hours)     ** No results found for the last 24 hours. **             ECG 12 Lead  Component   Ref Range & Units 12:08 3 mo ago   QT Interval   ms 326 P  449    Resulting Agency  ECG  ECG             HEART RATE= 136  bpm  RR Interval= 441  ms  AZ Interval=   ms  P Horizontal Axis=   deg  P Front Axis=   deg  QRSD Interval= 91  ms  QT Interval= 326  ms  QRS Axis= 37  deg  T Wave Axis= 164  deg  - ABNORMAL ECG -  Atrial fibrillation  Repolarization abnormality, prob rate related             Current Facility-Administered Medications:   •  acetaminophen (TYLENOL) tablet 500 mg, 500 mg, Oral, Q6H PRN, Mango Arnold MD, 500 mg at 06/06/22 2128  •  amLODIPine (NORVASC) tablet 5 mg, 5 mg, Oral, Daily, Mango Arnold MD, 5 mg at 06/07/22 0858  •  bisacodyl (DULCOLAX) suppository 10 mg, 10 mg, Rectal, Daily PRN, Mango Arnold MD  •  cefepime 2 gm IVPB in 100 ml NS (VTB), 2 g, Intravenous, Q24H, Mango Arnold MD  •  docusate sodium (COLACE) capsule 100 mg, 100 mg, Oral, BID, Mango Arnold MD, 100 mg at 06/07/22 0858  •  famotidine (PEPCID) tablet 20 mg, 20 mg, Oral, BID, Mango Arnold MD, 20 mg at 06/07/22 0858  •  gabapentin (NEURONTIN) capsule 300 mg, 300 mg, Oral, BID, Mango Arnold MD, 300 mg at 06/07/22 0858  •  insulin glargine (LANTUS, SEMGLEE) injection 15 Units, 15 Units, Subcutaneous, Nightly, Mango Arnold MD, 15 Units at 06/06/22 2131  •  insulin lispro (ADMELOG) injection 0-7 Units, 0-7 Units, Subcutaneous, 4x Daily With Meals & Nightly, Mango Arnold MD, 3 Units at 06/07/22 1223  •  insulin lispro (ADMELOG) injection 5 Units, 5 Units, Subcutaneous,  TID With Meals, Kadeem Arnold MD, 5 Units at 06/07/22 1223  •  levothyroxine (SYNTHROID, LEVOTHROID) tablet 25 mcg, 25 mcg, Oral, Daily, Kadeem Arnold MD, 25 mcg at 06/07/22 0858  •  LORazepam (ATIVAN) tablet 0.5 mg, 0.5 mg, Oral, Nightly, Kadeem Arnold MD, 0.5 mg at 06/06/22 2126  •  metoprolol tartrate (LOPRESSOR) tablet 12.5 mg, 12.5 mg, Oral, BID, Kadeem Arnold MD, 12.5 mg at 06/07/22 0858  •  montelukast (SINGULAIR) tablet 10 mg, 10 mg, Oral, Daily, Kadeem Arnold MD, 10 mg at 06/07/22 0857  •  polyethylene glycol (MIRALAX) packet 17 g, 17 g, Oral, BID AC, Kadeem Arnold MD, 17 g at 06/07/22 0636  •  [START ON 6/8/2022] predniSONE (DELTASONE) tablet 10 mg, 10 mg, Oral, Q48H, Kadeem Arnold MD  •  rosuvastatin (CRESTOR) tablet 5 mg, 5 mg, Oral, Nightly, Kadeem Arnold MD, 5 mg at 06/06/22 2134  •  tamsulosin (FLOMAX) 24 hr capsule 0.4 mg, 0.4 mg, Oral, Daily, Kadeem Arnold MD, 0.4 mg at 06/07/22 0857    ASSESSMENT  Acute Pseudomonas UTI with sepsis  Paroxysmal atrial fibrillation  Chronic diastolic CHF  Diabetes mellitus  Hypertension  Hyperlipidemia  Aortic stenosis s/p AVR  Pulmonary hypertension  Chronic constipation  Chronic indwelling Hoffman catheter  Gastroesophageal reflux disease    PLAN  CPM  IV antibiotics per infectious disease  Bowel program  Adjust nursing home medications  Stress ulcer DVT prophylaxis  Supportive care  DNR  PT/OT  Discussed with son and nursing staff  Follow closely further recommendation current hospital course    KADEEM ARNOLD MD

## 2022-06-08 ENCOUNTER — APPOINTMENT (OUTPATIENT)
Dept: LAB | Facility: HOSPITAL | Age: 87
End: 2022-06-08

## 2022-06-08 LAB
ANION GAP SERPL CALCULATED.3IONS-SCNC: 9.6 MMOL/L (ref 5–15)
BACTERIA SPEC AEROBE CULT: ABNORMAL
BACTERIA SPEC AEROBE CULT: ABNORMAL
BASOPHILS # BLD AUTO: 0.02 10*3/MM3 (ref 0–0.2)
BASOPHILS NFR BLD AUTO: 0.3 % (ref 0–1.5)
BUN SERPL-MCNC: 29 MG/DL (ref 8–23)
BUN/CREAT SERPL: 19 (ref 7–25)
CALCIUM SPEC-SCNC: 8.7 MG/DL (ref 8.2–9.6)
CHLORIDE SERPL-SCNC: 103 MMOL/L (ref 98–107)
CO2 SERPL-SCNC: 20.4 MMOL/L (ref 22–29)
CREAT SERPL-MCNC: 1.53 MG/DL (ref 0.57–1)
DEPRECATED RDW RBC AUTO: 45.6 FL (ref 37–54)
EGFRCR SERPLBLD CKD-EPI 2021: 32 ML/MIN/1.73
EOSINOPHIL # BLD AUTO: 0.22 10*3/MM3 (ref 0–0.4)
EOSINOPHIL NFR BLD AUTO: 2.9 % (ref 0.3–6.2)
ERYTHROCYTE [DISTWIDTH] IN BLOOD BY AUTOMATED COUNT: 14.3 % (ref 12.3–15.4)
GLUCOSE BLDC GLUCOMTR-MCNC: 191 MG/DL (ref 70–130)
GLUCOSE BLDC GLUCOMTR-MCNC: 216 MG/DL (ref 70–130)
GLUCOSE BLDC GLUCOMTR-MCNC: 234 MG/DL (ref 70–130)
GLUCOSE BLDC GLUCOMTR-MCNC: 267 MG/DL (ref 70–130)
GLUCOSE SERPL-MCNC: 191 MG/DL (ref 65–99)
HCT VFR BLD AUTO: 33 % (ref 34–46.6)
HGB BLD-MCNC: 10.8 G/DL (ref 12–15.9)
LYMPHOCYTES # BLD AUTO: 1.7 10*3/MM3 (ref 0.7–3.1)
LYMPHOCYTES NFR BLD AUTO: 22.7 % (ref 19.6–45.3)
MAGNESIUM SERPL-MCNC: 1.9 MG/DL (ref 1.7–2.3)
MCH RBC QN AUTO: 28.6 PG (ref 26.6–33)
MCHC RBC AUTO-ENTMCNC: 32.7 G/DL (ref 31.5–35.7)
MCV RBC AUTO: 87.5 FL (ref 79–97)
MONOCYTES # BLD AUTO: 0.6 10*3/MM3 (ref 0.1–0.9)
MONOCYTES NFR BLD AUTO: 8 % (ref 5–12)
NEUTROPHILS NFR BLD AUTO: 4.89 10*3/MM3 (ref 1.7–7)
NEUTROPHILS NFR BLD AUTO: 65.4 % (ref 42.7–76)
PLATELET # BLD AUTO: 90 10*3/MM3 (ref 140–450)
PMV BLD AUTO: 11.9 FL (ref 6–12)
POTASSIUM SERPL-SCNC: 4.3 MMOL/L (ref 3.5–5.2)
POTASSIUM SERPL-SCNC: 4.7 MMOL/L (ref 3.5–5.2)
QT INTERVAL: 433 MS
RBC # BLD AUTO: 3.77 10*6/MM3 (ref 3.77–5.28)
SODIUM SERPL-SCNC: 133 MMOL/L (ref 136–145)
WBC NRBC COR # BLD: 7.48 10*3/MM3 (ref 3.4–10.8)

## 2022-06-08 PROCEDURE — 63710000001 INSULIN LISPRO (HUMAN) PER 5 UNITS: Performed by: HOSPITALIST

## 2022-06-08 PROCEDURE — 80048 BASIC METABOLIC PNL TOTAL CA: CPT | Performed by: HOSPITALIST

## 2022-06-08 PROCEDURE — 93010 ELECTROCARDIOGRAM REPORT: CPT | Performed by: INTERNAL MEDICINE

## 2022-06-08 PROCEDURE — 85025 COMPLETE CBC W/AUTO DIFF WBC: CPT | Performed by: HOSPITALIST

## 2022-06-08 PROCEDURE — 83735 ASSAY OF MAGNESIUM: CPT | Performed by: HOSPITALIST

## 2022-06-08 PROCEDURE — 63710000001 PREDNISONE PER 5 MG: Performed by: HOSPITALIST

## 2022-06-08 PROCEDURE — 25010000002 CEFEPIME PER 500 MG: Performed by: HOSPITALIST

## 2022-06-08 PROCEDURE — 93005 ELECTROCARDIOGRAM TRACING: CPT | Performed by: HOSPITALIST

## 2022-06-08 PROCEDURE — 84132 ASSAY OF SERUM POTASSIUM: CPT | Performed by: HOSPITALIST

## 2022-06-08 PROCEDURE — 82962 GLUCOSE BLOOD TEST: CPT

## 2022-06-08 PROCEDURE — 97110 THERAPEUTIC EXERCISES: CPT

## 2022-06-08 RX ORDER — INSULIN LISPRO 100 [IU]/ML
8 INJECTION, SOLUTION INTRAVENOUS; SUBCUTANEOUS
Status: DISCONTINUED | OUTPATIENT
Start: 2022-06-08 | End: 2022-06-09

## 2022-06-08 RX ORDER — AMLODIPINE BESYLATE 5 MG/1
10 TABLET ORAL DAILY
Status: DISCONTINUED | OUTPATIENT
Start: 2022-06-09 | End: 2022-06-09 | Stop reason: HOSPADM

## 2022-06-08 RX ADMIN — FAMOTIDINE 20 MG: 20 TABLET ORAL at 20:59

## 2022-06-08 RX ADMIN — FAMOTIDINE 20 MG: 20 TABLET ORAL at 08:51

## 2022-06-08 RX ADMIN — CEFEPIME HYDROCHLORIDE 2 G: 2 INJECTION, POWDER, FOR SOLUTION INTRAVENOUS at 15:05

## 2022-06-08 RX ADMIN — INSULIN LISPRO 2 UNITS: 100 INJECTION, SOLUTION INTRAVENOUS; SUBCUTANEOUS at 08:50

## 2022-06-08 RX ADMIN — INSULIN LISPRO 7 UNITS: 100 INJECTION, SOLUTION INTRAVENOUS; SUBCUTANEOUS at 12:05

## 2022-06-08 RX ADMIN — LORAZEPAM 0.5 MG: 0.5 TABLET ORAL at 20:59

## 2022-06-08 RX ADMIN — INSULIN LISPRO 4 UNITS: 100 INJECTION, SOLUTION INTRAVENOUS; SUBCUTANEOUS at 22:18

## 2022-06-08 RX ADMIN — INSULIN LISPRO 3 UNITS: 100 INJECTION, SOLUTION INTRAVENOUS; SUBCUTANEOUS at 12:05

## 2022-06-08 RX ADMIN — AMLODIPINE BESYLATE 5 MG: 5 TABLET ORAL at 08:51

## 2022-06-08 RX ADMIN — GABAPENTIN 300 MG: 300 CAPSULE ORAL at 20:59

## 2022-06-08 RX ADMIN — METOPROLOL TARTRATE 12.5 MG: 25 TABLET, FILM COATED ORAL at 20:59

## 2022-06-08 RX ADMIN — INSULIN LISPRO 3 UNITS: 100 INJECTION, SOLUTION INTRAVENOUS; SUBCUTANEOUS at 17:37

## 2022-06-08 RX ADMIN — ACETAMINOPHEN 500 MG: 500 TABLET ORAL at 05:26

## 2022-06-08 RX ADMIN — INSULIN GLARGINE-YFGN 25 UNITS: 100 INJECTION, SOLUTION SUBCUTANEOUS at 22:19

## 2022-06-08 RX ADMIN — DOCUSATE SODIUM 100 MG: 100 CAPSULE, LIQUID FILLED ORAL at 08:51

## 2022-06-08 RX ADMIN — TAMSULOSIN HYDROCHLORIDE 0.4 MG: 0.4 CAPSULE ORAL at 08:51

## 2022-06-08 RX ADMIN — MONTELUKAST SODIUM 10 MG: 10 TABLET, FILM COATED ORAL at 08:51

## 2022-06-08 RX ADMIN — ROSUVASTATIN CALCIUM 5 MG: 5 TABLET, FILM COATED ORAL at 20:59

## 2022-06-08 RX ADMIN — INSULIN LISPRO 8 UNITS: 100 INJECTION, SOLUTION INTRAVENOUS; SUBCUTANEOUS at 17:37

## 2022-06-08 RX ADMIN — DOCUSATE SODIUM 100 MG: 100 CAPSULE, LIQUID FILLED ORAL at 20:59

## 2022-06-08 RX ADMIN — INSULIN LISPRO 7 UNITS: 100 INJECTION, SOLUTION INTRAVENOUS; SUBCUTANEOUS at 08:47

## 2022-06-08 RX ADMIN — LEVOTHYROXINE SODIUM 25 MCG: 0.03 TABLET ORAL at 08:51

## 2022-06-08 RX ADMIN — GABAPENTIN 300 MG: 300 CAPSULE ORAL at 08:51

## 2022-06-08 RX ADMIN — POLYETHYLENE GLYCOL 3350 17 G: 17 POWDER, FOR SOLUTION ORAL at 06:47

## 2022-06-08 RX ADMIN — PREDNISONE 10 MG: 10 TABLET ORAL at 08:51

## 2022-06-08 RX ADMIN — METOPROLOL TARTRATE 12.5 MG: 25 TABLET, FILM COATED ORAL at 08:51

## 2022-06-08 NOTE — PROGRESS NOTES
"Daily progress note    Chief complaint  Doing same  No new complaints   Denies fever cough or shortness of breath    History of present illness  89-year-old white female who is well-known to our service with history of atrial fibrillation aortic stenosis diabetes mellitus hypertension hyperlipidemia and chronic kidney disease stage IV who was recently treated for acute on chronic diastolic CHF currently at nursing home getting PT OT nursing care who also have chronic Hoffman catheter presented to [facility] with fever chills started this morning.  Patient has been feeling poorly for last few days.  Patient denies any chest pain increase shortness of breath abdominal pain diarrhea but does have nausea vomiting.  Patient work-up in ER revealed acute UTI with sepsis admit for management.  Patient is DNR per her wishes.     REVIEW OF SYSTEMS  Unremarkable except generalized weakness      PHYSICAL EXAM  Blood pressure 153/67, pulse 63, temperature 98.5 °F (36.9 °C), temperature source Oral, resp. rate 18, height 144.8 cm (57\"), weight 67.1 kg (148 lb), SpO2 92 %.    Constitutional:       General: She is not in acute distress.  HENT:      Head: Normocephalic and atraumatic.   Eyes:      Pupils: Pupils are equal, round, and reactive to light.   Cardiovascular:      Rate and Rhythm: Normal rate and regular rhythm.      Heart sounds: Normal heart sounds.   Pulmonary:      Effort: Pulmonary effort is normal. No respiratory distress.      Breath sounds: Normal breath sounds.   Abdominal:      Palpations: Abdomen is soft.      Tenderness: There is no abdominal tenderness. There is no guarding or rebound.   Musculoskeletal:         General: Normal range of motion.      Cervical back: Normal range of motion and neck supple.   Skin:     General: Skin is warm and dry.      Findings: No rash.   Neurological:      Mental Status: She is alert and oriented to person, place, and time.      Sensory: Sensation is intact. No " sensory deficit.      Motor: No weakness.   Psychiatric:         Mood and Affect: Mood and affect normal.      LAB RESULTS  Lab Results (last 24 hours)     Procedure Component Value Units Date/Time    Blood Culture - Blood, Arm, Left [[id number]]  (Normal) Collected: 06/05/22 1311    Specimen: Blood from Arm, Left Updated: 06/08/22 1317     Blood Culture No growth at 3 days    Blood Culture - Blood, Arm, Left [[id number]]  (Normal) Collected: 06/05/22 1218    Specimen: Blood from Arm, Left Updated: 06/08/22 1232     Blood Culture No growth at 3 days    Urine Culture - Urine, Urine, Catheter [[id number]]  (Abnormal)  (Susceptibility) Collected: 06/05/22 1211    Specimen: Urine, Catheter Updated: 06/08/22 1124     Urine Culture >100,000 CFU/mL Pseudomonas aeruginosa      50,000 CFU/mL Raoultella planticola    Narrative:      mics to follow  Colonization of the urinary tract without infection is common. Treatment is discouraged unless the patient is symptomatic, pregnant, or undergoing an invasive urologic procedure.  Colonization of the urinary tract without infection is common. Treatment is discouraged unless the patient is symptomatic, pregnant, or undergoing an invasive urologic procedure.    Susceptibility      Pseudomonas aeruginosa      EFFIE      Cefepime Susceptible      Ceftazidime Susceptible      Ciprofloxacin Susceptible      Gentamicin Susceptible      Levofloxacin Susceptible      Piperacillin + Tazobactam Susceptible                    Linear View               Susceptibility      Raoultella planticola      EFFIE      Ampicillin Resistant     Ampicillin + Sulbactam Susceptible      Cefazolin Susceptible      Cefepime Susceptible      Ceftazidime Susceptible      Ceftriaxone Susceptible      Gentamicin Susceptible      Levofloxacin Susceptible      Nitrofurantoin Susceptible      Piperacillin + Tazobactam Susceptible      Trimethoprim + Sulfamethoxazole Susceptible                    Linear View                    POC Glucose Once [[id number]]  (Abnormal) Collected: 06/08/22 1114    Specimen: Blood Updated: 06/08/22 1116     Glucose 216 mg/dL      Comment: Meter: [id number] : [id number] Goodman Jody JOHNSON       POC Glucose Once [[id number]]  (Abnormal) Collected: 06/08/22 0627    Specimen: Blood Updated: 06/08/22 0628     Glucose 191 mg/dL      Comment: Meter: [id number] : [id number] Bacilio SPRAGUE       Basic Metabolic Panel [[id number]]  (Abnormal) Collected: 06/08/22 0359    Specimen: Blood Updated: 06/08/22 0450     Glucose 191 mg/dL      BUN 29 mg/dL      Creatinine 1.53 mg/dL      Sodium 133 mmol/L      Potassium 4.3 mmol/L      Chloride 103 mmol/L      CO2 20.4 mmol/L      Calcium 8.7 mg/dL      BUN/Creatinine Ratio 19.0     Anion Gap 9.6 mmol/L      eGFR 32.0 mL/min/1.73      Comment: National Kidney Foundation and American Society of Nephrology (ASN) Task Force recommended calculation based on the Chronic Kidney Disease Epidemiology Collaboration (CKD-EPI) equation refit without adjustment for race.       Narrative:      GFR Normal >60  Chronic Kidney Disease <60  Kidney Failure <15      CBC & Differential [[id number]]  (Abnormal) Collected: 06/08/22 0359    Specimen: Blood Updated: 06/08/22 0438    Narrative:      The following orders were created for panel order CBC & Differential.  Procedure                               Abnormality         Status                     ---------                               -----------         ------                     CBC Auto Differential[[id number]]        Abnormal            Final result                 Please view results for these tests on the individual orders.    CBC Auto Differential [[id number]]  (Abnormal) Collected: 06/08/22 0359    Specimen: Blood Updated: 06/08/22 0438     WBC 7.48 10*3/mm3      RBC 3.77 10*6/mm3      Hemoglobin 10.8 g/dL      Hematocrit 33.0 %      MCV 87.5 fL      MCH 28.6 pg      MCHC 32.7 g/dL      RDW 14.3 %      RDW-SD 45.6 fl      MPV  11.9 fL      Platelets 90 10*3/mm3      Neutrophil % 65.4 %      Lymphocyte % 22.7 %      Monocyte % 8.0 %      Eosinophil % 2.9 %      Basophil % 0.3 %      Neutrophils, Absolute 4.89 10*3/mm3      Lymphocytes, Absolute 1.70 10*3/mm3      Monocytes, Absolute 0.60 10*3/mm3      Eosinophils, Absolute 0.22 10*3/mm3      Basophils, Absolute 0.02 10*3/mm3     POC Glucose Once [[id number]]  (Abnormal) Collected: 06/07/22 2011    Specimen: Blood Updated: 06/07/22 2013     Glucose 204 mg/dL      Comment: Meter: [id number] : [id number] Bacilio Mclaughlin PCA       POC Glucose Once [[id number]]  (Abnormal) Collected: 06/07/22 1559    Specimen: Blood Updated: 06/07/22 1601     Glucose 256 mg/dL      Comment: Meter: [id number] : [id number] Aaaubrie Butt NA           Imaging Results (Last 24 Hours)     ** No results found for the last 24 hours. **             ECG 12 Lead  Component   Ref Range & Units 12:08 3 mo ago   QT Interval   ms 326 P  449    Resulting Agency  ECG  ECG             HEART RATE= 136  bpm  RR Interval= 441  ms  DC Interval=   ms  P Horizontal Axis=   deg  P Front Axis=   deg  QRSD Interval= 91  ms  QT Interval= 326  ms  QRS Axis= 37  deg  T Wave Axis= 164  deg  - ABNORMAL ECG -  Atrial fibrillation  Repolarization abnormality, prob rate related             Current Facility-Administered Medications:   •  acetaminophen (TYLENOL) tablet 500 mg, 500 mg, Oral, Q6H PRN, Mango Arnold MD, 500 mg at 06/08/22 0526  •  amLODIPine (NORVASC) tablet 5 mg, 5 mg, Oral, Daily, Mango Arnold MD, 5 mg at 06/08/22 0851  •  bisacodyl (DULCOLAX) suppository 10 mg, 10 mg, Rectal, Daily PRN, Mango Arnold MD  •  cefepime 2 gm IVPB in 100 ml NS (VTB), 2 g, Intravenous, Q24H, Mango Arnold MD, 2 g at 06/08/22 1505  •  docusate sodium (COLACE) capsule 100 mg, 100 mg, Oral, BID, Mango Arnold MD, 100 mg at 06/08/22 0851  •  famotidine (PEPCID) tablet 20 mg, 20 mg, Oral, BID, Mango Arnold MD, 20 mg at 06/08/22 0851  •  gabapentin  (NEURONTIN) capsule 300 mg, 300 mg, Oral, BID, Mango Arnold MD, 300 mg at 06/08/22 0851  •  insulin glargine (LANTUS, SEMGLEE) injection 20 Units, 20 Units, Subcutaneous, Nightly, Mango Arnold MD, 20 Units at 06/07/22 2134  •  insulin lispro (ADMELOG) injection 0-7 Units, 0-7 Units, Subcutaneous, 4x Daily With Meals & Nightly, Mango Arnold MD, 3 Units at 06/08/22 1205  •  insulin lispro (ADMELOG) injection 7 Units, 7 Units, Subcutaneous, TID With Meals, Mango Arnold MD, 7 Units at 06/08/22 1205  •  levothyroxine (SYNTHROID, LEVOTHROID) tablet 25 mcg, 25 mcg, Oral, Daily, Mango Arnold MD, 25 mcg at 06/08/22 0851  •  LORazepam (ATIVAN) tablet 0.5 mg, 0.5 mg, Oral, Nightly, Mango Arnold MD, 0.5 mg at 06/07/22 2135  •  metoprolol tartrate (LOPRESSOR) tablet 12.5 mg, 12.5 mg, Oral, BID, Mango Arnold MD, 12.5 mg at 06/08/22 0851  •  montelukast (SINGULAIR) tablet 10 mg, 10 mg, Oral, Daily, Mango Arnold MD, 10 mg at 06/08/22 0851  •  polyethylene glycol (MIRALAX) packet 17 g, 17 g, Oral, BID AC, Mango Arnold MD, 17 g at 06/08/22 0647  •  predniSONE (DELTASONE) tablet 10 mg, 10 mg, Oral, Q48H, Mango Arnold MD, 10 mg at 06/08/22 0851  •  rosuvastatin (CRESTOR) tablet 5 mg, 5 mg, Oral, Nightly, Mango Arnold MD, 5 mg at 06/07/22 2134  •  tamsulosin (FLOMAX) 24 hr capsule 0.4 mg, 0.4 mg, Oral, Daily, Mango Arnold MD, 0.4 mg at 06/08/22 0851    ASSESSMENT  Acute Pseudomonas aeruginosa and Raoultella planticola UTI with sepsis  Paroxysmal atrial fibrillation  Chronic diastolic CHF  Diabetes mellitus  Hypertension  Hyperlipidemia  Aortic stenosis s/p AVR  Pulmonary hypertension  Chronic constipation  Chronic indwelling Hoffman catheter  Gastroesophageal reflux disease    PLAN  CPM  IV antibiotics per infectious disease  Bowel program  Adjust nursing home medications  Stress ulcer DVT prophylaxis  Supportive care  DNR  PT/OT  Discussed with son and nursing staff  Follow closely further recommendation current hospital  rasheeda RIVAS MD

## 2022-06-08 NOTE — PROGRESS NOTES
Continued Stay Note  [facility]     Patient Name: Helena Carmen  MRN: [id number]  Today's Date: 6/8/2022    Admit Date: 6/5/2022     Discharge Plan     Row Name 06/08/22 1629       Plan    Plan Return to [facility] skilled, bed available tomorrow    Patient/Family in Agreement with Plan yes    Plan Comments Per Soniya, [facility] can accept pt to return to her skilled bed tomorrow, 6/9, pending negative covid swab within 24 hours. Pharmacy updated. Packet in [facility] office. Lyn Pugh LCSW               Discharge Codes    No documentation.               Expected Discharge Date and Time     Expected Discharge Date Expected Discharge Time    Jun 9, 2022             Aparna Pugh LCSW

## 2022-06-08 NOTE — PLAN OF CARE
Goal Outcome Evaluation:  Plan of Care Reviewed With: patient        Progress: no change  Outcome Evaluation: Pt agreeable to PT and able to maintain activity level with therapy today. Pt continues to require assist x1 for ambulation with rwx; fatigues quickly with mobility. Pt completed LE ther ex with supervision. Will continue to benefit from PT for ongoing strengthening and mobility training. Rec return to SNF for ongoing rehab when medically ready.

## 2022-06-08 NOTE — THERAPY TREATMENT NOTE
Patient Name: Helena Carmen  : 1931    MRN: [id number]                              Today's Date: 2022       Admit Date: 2022    Visit Dx:     ICD-10-CM ICD-9-CM   1. Sepsis, due to unspecified organism, unspecified whether acute organ dysfunction present (HCC)  A41.9 038.9     995.91   2. Acute UTI  N39.0 599.0   3. Paroxysmal atrial fibrillation (HCC)  I48.0 427.31     Patient Active Problem List   Diagnosis   • Aortic valve stenosis   • Fatigue   • MI (mitral incompetence)   • PVC (premature ventricular contraction)   • Legally blind   • Essential hypertension   • Hypertriglyceridemia   • Pulmonary hypertension (HCC)   • Paroxysmal atrial fibrillation (HCC)   • Anxiety   • Chronic kidney disease   • Chronic pain disorder   • Degeneration of lumbar intervertebral disc   • Diabetes mellitus (HCC)   • Esophageal reflux   • Gastroparesis   • Hiatal hernia   • Iatrogenic hypothyroidism   • Long term (current) use of opiate analgesic   • Macular degeneration   • Malignant neoplasm of uterus (HCC)   • Osteoarthritis of knee   • Peripheral nerve disease   • Secondary hyperparathyroidism (HCC)   • Thrombocytopenia (HCC)   • Chronic heart failure with preserved ejection fraction (HCC)   • Other cirrhosis of liver (HCC)   • Hypothyroidism   • Nonrheumatic tricuspid valve regurgitation   • Sepsis (HCC)     Past Medical History:   Diagnosis Date   • Aortic valve stenosis     s/p tissue AVR   • Back pain    • CKD (chronic kidney disease)    • Colitis due to Clostridioides difficile 2022   • Diastolic dysfunction     Grade 2 per echocardiogram    • Diverticulosis    • Exertional shortness of breath    • Heart disease    • Hiatal hernia    • Hyperlipidemia    • Hypertension    • Hyperthyroidism    • Hypertriglyceridemia 2018   • Hypothyroidism    • Left ventricular hypertrophy    • Legally blind    • Liver disease    • Macular degeneration    • Mitral regurgitation    • Osteoarthritis of hip    •  Pancreatitis 01/26/2022   • Paroxysmal atrial fibrillation (HCC)    • Premature ventricular contractions    • Pulmonary hypertension (HCC)    • Renal insufficiency syndrome    • Type 2 diabetes mellitus (HCC)    • Uterine cancer (HCC)      Past Surgical History:   Procedure Laterality Date   • AORTIC VALVE REPAIR/REPLACEMENT     • CATARACT EXTRACTION      1970, 1999   • ENDOSCOPY  08/15/2014    no gross lesions in stomach/duodenum, erythrematous mucosa in stomach   • HYSTERECTOMY  2007   • STERNOTOMY        General Information     Row Name 06/08/22 1520          Physical Therapy Time and Intention    Document Type therapy note (daily note)  -EE     Mode of Treatment physical therapy;individual therapy  -EE     Row Name 06/08/22 1520          General Information    Existing Precautions/Restrictions fall  -EE     Barriers to Rehab none identified  -EE     Row Name 06/08/22 1520          Cognition    Orientation Status (Cognition) oriented x 3  -EE     Row Name 06/08/22 1520          Safety Issues, Functional Mobility    Impairments Affecting Function (Mobility) endurance/activity tolerance;shortness of breath;strength;postural/trunk control  -EE           User Key  (r) = Recorded By, (t) = Taken By, (c) = Cosigned By    Initials Name Provider Type    EE Gena Maki, PT Physical Therapist               Mobility     Row Name 06/08/22 1520          Bed Mobility    Bed Mobility supine-sit  -EE     Supine-Sit Burley (Bed Mobility) contact guard  -     Assistive Device (Bed Mobility) head of bed elevated;bed rails  -     Row Name 06/08/22 1520          Sit-Stand Transfer    Sit-Stand Burley (Transfers) contact guard;verbal cues  -     Assistive Device (Sit-Stand Transfers) walker, front-wheeled  -EE     Comment, (Sit-Stand Transfer) 5x STS from EOB for functional strengthening; vc's for hand placement  -     Row Name 06/08/22 1520          Gait/Stairs (Locomotion)    Burley Level (Gait) contact  guard;verbal cues  -EE     Assistive Device (Gait) walker, front-wheeled  -EE     Distance in Feet (Gait) 70'  -EE     Deviations/Abnormal Patterns (Gait) paul decreased;stride length decreased  -EE     Bilateral Gait Deviations forward flexed posture;heel strike decreased;weight shift ability decreased  -EE     Comment, (Gait/Stairs) Very forward flexed posture; repeated cues to correct as much as possible. Increasing SOA noted during ambulation.  -EE           User Key  (r) = Recorded By, (t) = Taken By, (c) = Cosigned By    Initials Name Provider Type    EE Gena Maki, CANDY Physical Therapist               Obj/Interventions     Row Name 06/08/22 1521          Motor Skills    Therapeutic Exercise other (see comments)  seated B ankle pumps, LAQ, marching in place x 10 reps each  -EE           User Key  (r) = Recorded By, (t) = Taken By, (c) = Cosigned By    Initials Name Provider Type    EE Gena Maki, PT Physical Therapist               Goals/Plan    No documentation.                Clinical Impression     Row Name 06/08/22 1522          Pain    Pretreatment Pain Rating 0/10 - no pain  -EE     Posttreatment Pain Rating 0/10 - no pain  -EE     Row Name 06/08/22 1522          Plan of Care Review    Plan of Care Reviewed With patient  -EE     Progress no change  -EE     Outcome Evaluation Pt agreeable to PT and able to maintain activity level with therapy today. Pt continues to require assist x1 for ambulation with rwx; fatigues quickly with mobility. Pt completed LE ther ex with supervision. Will continue to benefit from PT for ongoing strengthening and mobility training. Rec return to SNF for ongoing rehab when medically ready.  -EE     Row Name 06/08/22 1522          Vital Signs    Pre SpO2 (%) 97  -EE     O2 Delivery Pre Treatment room air  -EE     Intra SpO2 (%) 93  -EE     O2 Delivery Intra Treatment room air  -EE     Post SpO2 (%) 96  -EE     O2 Delivery Post Treatment room air  -EE     Pre Patient  Position Supine  -EE     Intra Patient Position Standing  -EE     Post Patient Position Sitting  -EE     Row Name 06/08/22 1522          Positioning and Restraints    Pre-Treatment Position in bed  -EE     Post Treatment Position chair  -EE     In Chair reclined;call light within reach;encouraged to call for assist;exit alarm on;legs elevated;with family/caregiver  -EE           User Key  (r) = Recorded By, (t) = Taken By, (c) = Cosigned By    Initials Name Provider Type    Gena Escalera PT Physical Therapist               Outcome Measures     Row Name 06/08/22 1523          How much help from another person do you currently need...    Turning from your back to your side while in flat bed without using bedrails? 3  -EE     Moving from lying on back to sitting on the side of a flat bed without bedrails? 3  -EE     Moving to and from a bed to a chair (including a wheelchair)? 3  -EE     Standing up from a chair using your arms (e.g., wheelchair, bedside chair)? 3  -EE     Climbing 3-5 steps with a railing? 2  -EE     To walk in hospital room? 3  -EE     AM-PAC 6 Clicks Score (PT) 17  -EE     Highest level of mobility 5 --> Static standing  -EE           User Key  (r) = Recorded By, (t) = Taken By, (c) = Cosigned By    Initials Name Provider Type    Gena Escalera PT Physical Therapist                             Physical Therapy Education                 Title: PT OT SLP Therapies (In Progress)     Topic: Physical Therapy (In Progress)     Point: Mobility training (Done)     Learning Progress Summary           Patient Acceptance, E, VU,NR by EE at 6/8/2022 1523    Acceptance, E, VU,NR by EE at 6/6/2022 0930                   Point: Home exercise program (Done)     Learning Progress Summary           Patient Acceptance, E, VU,NR by EE at 6/8/2022 1523                   Point: Body mechanics (Not Started)     Learner Progress:  Not documented in this visit.          Point: Precautions (Not Started)     Learner  Progress:  Not documented in this visit.                      User Key     Initials Effective Dates Name Provider Type Discipline    EE 06/16/21 -  Gena Maki PT Physical Therapist PT              PT Recommendation and Plan  Planned Therapy Interventions (PT): balance training, bed mobility training, gait training, home exercise program, patient/family education, ROM (range of motion), stair training, strengthening, transfer training, postural re-education  Plan of Care Reviewed With: patient  Progress: no change  Outcome Evaluation: Pt agreeable to PT and able to maintain activity level with therapy today. Pt continues to require assist x1 for ambulation with rwx; fatigues quickly with mobility. Pt completed LE ther ex with supervision. Will continue to benefit from PT for ongoing strengthening and mobility training. Rec return to SNF for ongoing rehab when medically ready.     Time Calculation:    PT Charges     Row Name 06/08/22 1523             Time Calculation    Start Time 1400  -EE      Stop Time 1415  -EE      Time Calculation (min) 15 min  -EE      PT Received On 06/08/22  -EE      PT - Next Appointment 06/09/22  -EE              Time Calculation- PT    Total Timed Code Minutes- PT 15 minute(s)  -EE              Timed Charges    98141 - PT Therapeutic Exercise Minutes 15  -EE              Total Minutes    Timed Charges Total Minutes 15  -EE       Total Minutes 15  -EE            User Key  (r) = Recorded By, (t) = Taken By, (c) = Cosigned By    Initials Name Provider Type    EE Gena Maki PT Physical Therapist              Therapy Charges for Today     Code Description Service Date Service Provider Modifiers Qty    69687610735 HC PT THER PROC EA 15 MIN 6/8/2022 Gena Maki PT GP 1          PT G-Codes  Outcome Measure Options: AM-PAC 6 Clicks Daily Activity (OT)  AM-PAC 6 Clicks Score (PT): 17  AM-PAC 6 Clicks Score (OT): 16     Patient was wearing a face mask during this therapy encounter. Therapist  used appropriate personal protective equipment including mask and gloves.  Mask used was standard procedure mask. Appropriate PPE was worn during the entire therapy session. Hand hygiene was completed before and after therapy session. Patient is not in enhanced droplet precautions.       Gena Maki, PT  6/8/2022

## 2022-06-08 NOTE — PLAN OF CARE
Goal Outcome Evaluation:  Plan of Care Reviewed With: patient        Progress: improving  Outcome Evaluation: Patient VSS, on RA, jimenes changed per ID order. IV ABX, denies pain. Possible D/C back to facility tomorrow. Will continue to monitor.

## 2022-06-08 NOTE — PLAN OF CARE
Goal Outcome Evaluation:  Plan of Care Reviewed With: patient        Progress: improving  Outcome Evaluation: pt continues to receive Abx for UTI. chronic jimenes in place. q2h turns. NSR wth frequent PVCs. alert and oriented x 4.

## 2022-06-09 ENCOUNTER — TELEPHONE (OUTPATIENT)
Dept: CARDIOLOGY | Facility: HOSPITAL | Age: 87
End: 2022-06-09

## 2022-06-09 ENCOUNTER — TELEPHONE (OUTPATIENT)
Dept: CARDIOLOGY | Facility: CLINIC | Age: 87
End: 2022-06-09

## 2022-06-09 VITALS
RESPIRATION RATE: 16 BRPM | TEMPERATURE: 98.4 F | HEART RATE: 62 BPM | HEIGHT: 57 IN | OXYGEN SATURATION: 91 % | BODY MASS INDEX: 31.93 KG/M2 | DIASTOLIC BLOOD PRESSURE: 65 MMHG | WEIGHT: 148 LBS | SYSTOLIC BLOOD PRESSURE: 145 MMHG

## 2022-06-09 LAB
ANION GAP SERPL CALCULATED.3IONS-SCNC: 13 MMOL/L (ref 5–15)
BASOPHILS # BLD AUTO: 0.04 10*3/MM3 (ref 0–0.2)
BASOPHILS NFR BLD AUTO: 0.6 % (ref 0–1.5)
BUN SERPL-MCNC: 31 MG/DL (ref 8–23)
BUN/CREAT SERPL: 23.5 (ref 7–25)
CALCIUM SPEC-SCNC: 8.9 MG/DL (ref 8.2–9.6)
CHLORIDE SERPL-SCNC: 105 MMOL/L (ref 98–107)
CO2 SERPL-SCNC: 21 MMOL/L (ref 22–29)
CREAT SERPL-MCNC: 1.32 MG/DL (ref 0.57–1)
DEPRECATED RDW RBC AUTO: 47.8 FL (ref 37–54)
EGFRCR SERPLBLD CKD-EPI 2021: 38.2 ML/MIN/1.73
EOSINOPHIL # BLD AUTO: 0.09 10*3/MM3 (ref 0–0.4)
EOSINOPHIL NFR BLD AUTO: 1.3 % (ref 0.3–6.2)
ERYTHROCYTE [DISTWIDTH] IN BLOOD BY AUTOMATED COUNT: 14.4 % (ref 12.3–15.4)
GLUCOSE BLDC GLUCOMTR-MCNC: 193 MG/DL (ref 70–130)
GLUCOSE BLDC GLUCOMTR-MCNC: 209 MG/DL (ref 70–130)
GLUCOSE SERPL-MCNC: 203 MG/DL (ref 65–99)
HCT VFR BLD AUTO: 33.9 % (ref 34–46.6)
HGB BLD-MCNC: 10.8 G/DL (ref 12–15.9)
LYMPHOCYTES # BLD AUTO: 1.74 10*3/MM3 (ref 0.7–3.1)
LYMPHOCYTES NFR BLD AUTO: 25.9 % (ref 19.6–45.3)
MAGNESIUM SERPL-MCNC: 1.8 MG/DL (ref 1.7–2.3)
MCH RBC QN AUTO: 28.8 PG (ref 26.6–33)
MCHC RBC AUTO-ENTMCNC: 31.9 G/DL (ref 31.5–35.7)
MCV RBC AUTO: 90.4 FL (ref 79–97)
MONOCYTES # BLD AUTO: 0.55 10*3/MM3 (ref 0.1–0.9)
MONOCYTES NFR BLD AUTO: 8.2 % (ref 5–12)
NEUTROPHILS NFR BLD AUTO: 4.22 10*3/MM3 (ref 1.7–7)
NEUTROPHILS NFR BLD AUTO: 63 % (ref 42.7–76)
PLATELET # BLD AUTO: 92 10*3/MM3 (ref 140–450)
PMV BLD AUTO: 11.2 FL (ref 6–12)
POTASSIUM SERPL-SCNC: 4.2 MMOL/L (ref 3.5–5.2)
RBC # BLD AUTO: 3.75 10*6/MM3 (ref 3.77–5.28)
SARS-COV-2 RNA RESP QL NAA+PROBE: NOT DETECTED
SODIUM SERPL-SCNC: 139 MMOL/L (ref 136–145)
WBC NRBC COR # BLD: 6.71 10*3/MM3 (ref 3.4–10.8)

## 2022-06-09 PROCEDURE — 82962 GLUCOSE BLOOD TEST: CPT

## 2022-06-09 PROCEDURE — U0003 INFECTIOUS AGENT DETECTION BY NUCLEIC ACID (DNA OR RNA); SEVERE ACUTE RESPIRATORY SYNDROME CORONAVIRUS 2 (SARS-COV-2) (CORONAVIRUS DISEASE [COVID-19]), AMPLIFIED PROBE TECHNIQUE, MAKING USE OF HIGH THROUGHPUT TECHNOLOGIES AS DESCRIBED BY CMS-2020-01-R: HCPCS | Performed by: HOSPITALIST

## 2022-06-09 PROCEDURE — 85025 COMPLETE CBC W/AUTO DIFF WBC: CPT | Performed by: HOSPITALIST

## 2022-06-09 PROCEDURE — 93005 ELECTROCARDIOGRAM TRACING: CPT | Performed by: NURSE PRACTITIONER

## 2022-06-09 PROCEDURE — 99222 1ST HOSP IP/OBS MODERATE 55: CPT | Performed by: INTERNAL MEDICINE

## 2022-06-09 PROCEDURE — 80048 BASIC METABOLIC PNL TOTAL CA: CPT | Performed by: HOSPITALIST

## 2022-06-09 PROCEDURE — U0005 INFEC AGEN DETEC AMPLI PROBE: HCPCS | Performed by: HOSPITALIST

## 2022-06-09 PROCEDURE — 63710000001 INSULIN LISPRO (HUMAN) PER 5 UNITS: Performed by: HOSPITALIST

## 2022-06-09 PROCEDURE — 93010 ELECTROCARDIOGRAM REPORT: CPT | Performed by: INTERNAL MEDICINE

## 2022-06-09 PROCEDURE — 83735 ASSAY OF MAGNESIUM: CPT | Performed by: NURSE PRACTITIONER

## 2022-06-09 RX ORDER — LORAZEPAM 0.5 MG/1
0.5 TABLET ORAL
Qty: 3 TABLET | Refills: 0 | Status: SHIPPED | OUTPATIENT
Start: 2022-06-09 | End: 2022-06-12

## 2022-06-09 RX ORDER — GABAPENTIN 300 MG/1
300 CAPSULE ORAL 2 TIMES DAILY
Qty: 6 CAPSULE | Refills: 0 | Status: SHIPPED | OUTPATIENT
Start: 2022-06-09 | End: 2022-07-26 | Stop reason: ALTCHOICE

## 2022-06-09 RX ORDER — PSEUDOEPHEDRINE HCL 30 MG
100 TABLET ORAL 2 TIMES DAILY
Qty: 60 CAPSULE | Refills: 0 | Status: SHIPPED | OUTPATIENT
Start: 2022-06-09 | End: 2022-07-09

## 2022-06-09 RX ORDER — CEFDINIR 300 MG/1
300 CAPSULE ORAL
Qty: 2 CAPSULE | Refills: 0 | Status: SHIPPED | OUTPATIENT
Start: 2022-06-10 | End: 2022-06-12

## 2022-06-09 RX ORDER — INSULIN LISPRO 100 [IU]/ML
10 INJECTION, SOLUTION INTRAVENOUS; SUBCUTANEOUS
Status: DISCONTINUED | OUTPATIENT
Start: 2022-06-09 | End: 2022-06-09 | Stop reason: HOSPADM

## 2022-06-09 RX ORDER — INSULIN GLARGINE 100 [IU]/ML
30 INJECTION, SOLUTION SUBCUTANEOUS NIGHTLY
Qty: 100 ML | Refills: 0 | Status: SHIPPED | OUTPATIENT
Start: 2022-06-09 | End: 2022-07-26 | Stop reason: ALTCHOICE

## 2022-06-09 RX ORDER — CEFDINIR 300 MG/1
300 CAPSULE ORAL
Status: DISCONTINUED | OUTPATIENT
Start: 2022-06-09 | End: 2022-06-09 | Stop reason: HOSPADM

## 2022-06-09 RX ADMIN — INSULIN LISPRO 8 UNITS: 100 INJECTION, SOLUTION INTRAVENOUS; SUBCUTANEOUS at 13:09

## 2022-06-09 RX ADMIN — TAMSULOSIN HYDROCHLORIDE 0.4 MG: 0.4 CAPSULE ORAL at 09:23

## 2022-06-09 RX ADMIN — AMLODIPINE BESYLATE 10 MG: 5 TABLET ORAL at 09:24

## 2022-06-09 RX ADMIN — LEVOTHYROXINE SODIUM 25 MCG: 0.03 TABLET ORAL at 09:23

## 2022-06-09 RX ADMIN — GABAPENTIN 300 MG: 300 CAPSULE ORAL at 09:24

## 2022-06-09 RX ADMIN — FAMOTIDINE 20 MG: 20 TABLET ORAL at 09:24

## 2022-06-09 RX ADMIN — MONTELUKAST SODIUM 10 MG: 10 TABLET, FILM COATED ORAL at 09:23

## 2022-06-09 RX ADMIN — CEFDINIR 300 MG: 300 CAPSULE ORAL at 13:07

## 2022-06-09 RX ADMIN — METOPROLOL TARTRATE 12.5 MG: 25 TABLET, FILM COATED ORAL at 09:24

## 2022-06-09 RX ADMIN — INSULIN LISPRO 2 UNITS: 100 INJECTION, SOLUTION INTRAVENOUS; SUBCUTANEOUS at 13:09

## 2022-06-09 NOTE — PROGRESS NOTES
Case Management Discharge Note      Final Note: [facility] SN[facility] ([facility] confirms bed) via family transport (family decline to privately pay for w/c van transport, cancelled 5:00 PM wheelchair van via [employer]). Covid swab resulted negative. Packet provided to CHARITY Pugh LCSW         Selected Continued Care - Discharged on 6/9/2022 Admission date: 6/5/2022 - Discharge disposition: Skilled Nursing Facility (DC - External)    Destination Coordination complete.    Service Provider Selected Services Address Phone Fax Patient Preferred    [facility]  Skilled Nursing [address] [facility] [address] [phone] --          Durable Medical Equipment    No services have been selected for the patient.              Dialysis/Infusion    No services have been selected for the patient.              Home Medical Care    No services have been selected for the patient.              Therapy    No services have been selected for the patient.              Community Resources    No services have been selected for the patient.              Community & DME    No services have been selected for the patient.                Selected Continued Care - Prior Encounters Includes selections from prior encounters from 3/7/2022 to 6/9/2022    Discharged on 3/9/2022 Admission date: 3/2/2022 - Discharge disposition: Skilled Nursing Facility (DC - External)    Destination     Service Provider Selected Services Address Phone Fax Patient Preferred    [facility]  Skilled Nursing [address] [facility] [address] [phone] --                    Transportation Services  Private: Car    Final Discharge Disposition Code: 03 - skilled nursing facility (SNF)

## 2022-06-09 NOTE — NURSING NOTE
Patient discharged and IV removed and reports and discharge discussed with family and report given to Natalya Tavarezhalla pt and family had no further issues or concerns

## 2022-06-09 NOTE — TELEPHONE ENCOUNTER
Pt is currently hospitalized and Margaret wanted you to review records. She stated she called last week but did not get a response. I wanted to make sure you got the message. This was sent to my  by mistake. Please address. Thanks..Francisca

## 2022-06-09 NOTE — NURSING NOTE
Pt's daughter Margaret and patient request to see Dr. Butcher instead of Dr. Bruno's group. Consult then placed per order by dr. Arnold.

## 2022-06-09 NOTE — NURSING NOTE
New orders from cardiology to place pt NPO at midnight and to get an ECG, mag, and potassium in the AM.

## 2022-06-09 NOTE — PLAN OF CARE
Goal Outcome Evaluation:  Plan of Care Reviewed With: patient        Progress: improving  Outcome Evaluation: pt had a 6 beat run of VE yesterday, ecg obtained and was abnormal, see notes. cardiology consulted. NPO since midnight. pt denies Chest pain. aysmptomatic. iv abx continued. q2h turns. VSS.

## 2022-06-09 NOTE — DISCHARGE SUMMARY
Discharge summary     Date of admission 6/5/2022  Date of discharge 6/9/2021    Final diagnosis  Acute Pseudomonas aeruginosa and Raoultella planticola UTI with sepsis  Paroxysmal atrial fibrillation  Aortic stenosis s/p aortic valve replacement  Chronic diastolic CHF  Diabetes mellitus  Hypertension  Hyperlipidemia  Pulmonary hypertension  Chronic constipation  Chronic indwelling Hoffman catheter  Gastroesophageal reflux disease    Discharge medications    Current Facility-Administered Medications:   •  amLODIPine (NORVASC) tablet 10 mg, 10 mg, Oral, Daily, Mango Arnold MD, 10 mg at 06/09/22 0924  •  cefdinir (OMNICEF) capsule 300 mg, 300 mg, Oral, Q24H, Dheeraj Lynch MD, 300 mg at 06/09/22 1307  •  docusate sodium (COLACE) capsule 100 mg, 100 mg, Oral, BID, Mango Arnold MD, 100 mg at 06/08/22 2059  •  famotidine (PEPCID) tablet 20 mg, 20 mg, Oral, BID, Mango Arnold MD, 20 mg at 06/09/22 0924  •  gabapentin (NEURONTIN) capsule 300 mg, 300 mg, Oral, BID, Mango Arnold MD, 300 mg at 06/09/22 0924  •  insulin glargine (LANTUS, SEMGLEE) injection 30 Units, 30 Units, Subcutaneous, Nightly, Mango Arnold MD  •  insulin lispro (ADMELOG) injection 0-7 Units, 0-7 Units, Subcutaneous, 4x Daily With Meals & Nightly, Mango Arnold MD, 4 Units at 06/08/22 2218  •  insulin lispro (ADMELOG) injection 10 Units, 10 Units, Subcutaneous, TID With Meals, Mango Arnold MD  •  levothyroxine (SYNTHROID, LEVOTHROID) tablet 25 mcg, 25 mcg, Oral, Daily, Mango Arnold MD, 25 mcg at 06/09/22 0923  •  LORazepam (ATIVAN) tablet 0.5 mg, 0.5 mg, Oral, Nightly, Mango Arnold MD, 0.5 mg at 06/08/22 2059  •  metoprolol tartrate (LOPRESSOR) tablet 12.5 mg, 12.5 mg, Oral, BID, Mango Arnold MD, 12.5 mg at 06/09/22 0924  •  montelukast (SINGULAIR) tablet 10 mg, 10 mg, Oral, Daily, Mango Arnold MD, 10 mg at 06/09/22 0923  •  polyethylene glycol (MIRALAX) packet 17 g, 17 g, Oral, BID AC, Mango Arnold MD, 17 g at 06/08/22 0647  •  predniSONE  (DELTASONE) tablet 10 mg, 10 mg, Oral, Q48H, Kadeem Arnold MD, 10 mg at 06/08/22 0851  •  rosuvastatin (CRESTOR) tablet 5 mg, 5 mg, Oral, Nightly, Kadeem Arnold MD, 5 mg at 06/08/22 2059  •  tamsulosin (FLOMAX) 24 hr capsule 0.4 mg, 0.4 mg, Oral, Daily, Kadeem Arnold MD, 0.4 mg at 06/09/22 0923     Consults obtained  Infectious disease  Cardiology  Nutrition    Procedures  None    Hospital course  89-year white female with very complex past medical history including atrial fibrillation aortic stenosis status post aortic valve replacement diabetes hypertension hyperlipidemia and chronic kidney disease admitted to emergency room with fever chills.  Patient work-up in ER revealed acute UTI with sepsis admit for management.  Patient admitted treated with IV antibiotics after obtaining the cultures and her culture came back positive for Pseudomonas and  Raoultella which was sensitive to cefepime.  Patient Hoffman catheter has been changed and infectious disease recommended continue oral Omnicef for 3 more days.  Patient is feeling better but prior to discharge she has nonsustained V. tach further evaluated by cardiology recommend no further work-up.  Patient medication adjusted during this hospitalization.  Patient remained DNR throughout hospital course.    Discharge diet regular    Activity per PT OT    Medication as above    Further care per accepting physician at nursing home and follow-up with cardiology and infectious disease per their instruction and take medication as directed.    KADEEM ARNOLD MD

## 2022-06-09 NOTE — CONSULTS
Patient Name: Helena Carmen  :1931  89 y.o.    Date of Admission: 2022  Encounter Provider: Dasia Gomez MD  Date of Encounter Visit: 22  Place of Service: [facility] CARDIOLOGY  Referring Provider: Mango Arnold MD  Patient Care Team:  Mariah Hickman MD as PCP - General (Family Medicine)  Beth Butcher MD as Consulting Physician (Cardiology)  Rolando Wick MD as Consulting Physician (Nephrology)  Pablo Sherman Jr., MD as Consulting Physician (Hematology and Oncology)  Mango Arnold MD as Referring Physician (Internal Medicine)      Chief complaint: fever      History of Present Illness:     This is a patient of Dr. Butcher's who also follows in the heart failure clinic. She has an indwelling catheter and history of UTIs. She has chronic kidney disease, hypertension, hypothyroidism, morbid obesity, paroxysmal atrial fibrillation. She had severe aortic stenosis and had an aortic valve replacement with a [place] Trifecta bovine pericardial valve, 21 mm, in 2013. She has exacerbations of heart failure with preserved ejection fraction. She is legally blind. She was admitted this time with a urinary tract infection. She is DNR. She had paroxysmal atrial fibrillation on 2022.     She denies any chest pain or shortness of breath.  Occasional palpitation but nothing significant.  Asymptomatic currently.    ECHO 22    · Estimated left ventricular EF = 68% Left ventricular systolic function is normal.  · Left ventricular diastolic function is consistent with age.  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 The prosthetic aortic valve peak and mean gradients are elevated.  · Peak velocity of the flow distal to the aortic valve is 294.5 cm/s.  · Mild tricuspid valve regurgitation is present. Estimated right ventricular systolic pressure from tricuspid regurgitation is mildly elevated (35-45 mmHg). Calculated right ventricular  systolic pressure from tricuspid regurgitation is 37.7 mmHg.        Past Medical History:   Diagnosis Date   • Aortic valve stenosis     s/p tissue AVR   • Back pain    • CKD (chronic kidney disease)    • Colitis due to Clostridioides difficile 01/26/2022   • Diastolic dysfunction     Grade 2 per echocardiogram 2013   • Diverticulosis    • Exertional shortness of breath    • Heart disease    • Hiatal hernia    • Hyperlipidemia    • Hypertension    • Hyperthyroidism    • Hypertriglyceridemia 05/31/2018   • Hypothyroidism    • Left ventricular hypertrophy    • Legally blind    • Liver disease    • Macular degeneration    • Mitral regurgitation    • Osteoarthritis of hip    • Pancreatitis 01/26/2022   • Paroxysmal atrial fibrillation (HCC)    • Premature ventricular contractions    • Pulmonary hypertension (HCC)    • Renal insufficiency syndrome    • Type 2 diabetes mellitus (HCC)    • Uterine cancer (HCC)        Past Surgical History:   Procedure Laterality Date   • AORTIC VALVE REPAIR/REPLACEMENT     • CATARACT EXTRACTION      1970, 1999   • ENDOSCOPY  08/15/2014    no gross lesions in stomach/duodenum, erythrematous mucosa in stomach   • HYSTERECTOMY  2007   • STERNOTOMY           Prior to Admission medications    Medication Sig Start Date End Date Taking? Authorizing Provider   acetaminophen (TYLENOL) 500 MG tablet Take 1,000 mg by mouth Every 6 (Six) Hours As Needed for Mild Pain .   Yes Luke Grove MD   amLODIPine (NORVASC) 10 MG tablet Take 1 tablet by mouth daily. 9/19/13  Yes Luke Grove MD   ammonium lactate (AMLACTIN) 12 % cream Apply 1 application topically to the appropriate area as directed Every 12 (Twelve) Hours.   Yes Luke Grove MD   ascorbic acid (VITAMIN C) 500 MG tablet Take 500 mg by mouth 2 (Two) Times a Day.   Yes Luke Grove MD   bisacodyl (DULCOLAX) 10 MG suppository Insert 10 mg into the rectum Daily As Needed for Constipation.   Yes [place]  MD Luke   Cyanocobalamin (VITAMIN B 12 PO) Take 1,000 mcg by mouth Daily.   Yes Luke Grove MD   famotidine (PEPCID) 10 MG tablet Take 10 mg by mouth 2 (Two) Times a Day.   Yes Provider, Historical, MD   furosemide (LASIX) 20 MG tablet Take 20 mg by mouth Daily.   Yes Luke Grove MD   gabapentin (NEURONTIN) 300 MG capsule Take 300 mg by mouth 2 (Two) Times a Day.   Yes Provider, Historical, MD   hydrALAZINE (APRESOLINE) 25 MG tablet Take 25 mg by mouth 3 (Three) Times a Day.   Yes Provider, Historical, MD   insulin glargine (LANTUS, SEMGLEE) 100 UNIT/ML injection Inject 22 Units under the skin into the appropriate area as directed Every Night.   Yes Provider, Historical, MD   Insulin Lispro (ADMELOG SOLOSTAR SC) Inject 10 Units under the skin into the appropriate area as directed 3 (Three) Times a Day With Meals.   Yes Provider, Historical, MD   insulin lispro (humaLOG) 100 UNIT/ML injection Inject 0-10 Units under the skin into the appropriate area as directed 3 (Three) Times a Day Before Meals. 2 units for every 50 > 150   Yes Luke Grove MD   isosorbide mononitrate (IMDUR) 30 MG 24 hr tablet Take 30 mg by mouth Daily.   Yes Provider, Historical, MD   levothyroxine (SYNTHROID, LEVOTHROID) 25 MCG tablet Take 1 tablet by mouth daily. 3/8/13  Yes Provider, Historical, MD   linagliptin (TRADJENTA) 5 MG tablet tablet Take 5 mg by mouth daily.   Yes Provider, Historical, MD   LORazepam (ATIVAN) 0.5 MG tablet Take 0.5 mg by mouth every night at bedtime. 2/22/22  Yes Provider, Historical, MD   magnesium hydroxide (MILK OF MAGNESIA) 400 MG/5ML suspension Take  by mouth Daily As Needed for Constipation.   Yes Luke Grove MD   metoprolol tartrate (LOPRESSOR) 25 MG tablet Take 12.5 mg by mouth 2 (Two) Times a Day.   Yes Provider, Historical, MD   montelukast (SINGULAIR) 10 MG tablet Take 1 tablet by mouth Every Night. 7/12/13  Yes Provider, Historical, MD   nitrofurantoin,  macrocrystal-monohydrate, (MACROBID) 100 MG capsule Take 100 mg by mouth 2 (Two) Times a Day. 6/3/22 6/10/22 Yes Luke Grove MD   polyethylene glycol (MIRALAX) 17 GM/SCOOP powder Take 17 g by mouth Daily.   Yes Luke Grove MD   potassium chloride (MICRO-K) 10 MEQ CR capsule Take 10 mEq by mouth Daily.   Yes Luke Grove MD   predniSONE (DELTASONE) 5 MG tablet Take 10 mg by mouth Every Other Day.   Yes Luke Grove MD   rosuvastatin (CRESTOR) 5 MG tablet Take 1 tablet by mouth Daily.   Yes Luke Grove MD   sodium bicarbonate 650 MG tablet Take 650 mg by mouth 3 (Three) Times a Day.   Yes Luke Grove MD   spironolactone (ALDACTONE) 25 MG tablet Take 12.5 mg by mouth Daily.   Yes Luke Grove MD   tamsulosin (FLOMAX) 0.4 MG capsule 24 hr capsule Take 0.4 mg by mouth every night at bedtime.   Yes Luke Grove MD       Allergies   Allergen Reactions   • Erythromycin Unknown (See Comments) and Other (See Comments)     Pt states she does not remember but it was many years ago  Pt states she does not remember but it was many years ago   • Cephalexin Other (See Comments) and Unknown (See Comments)     2022 Pt has tolerated ceftriaxone and cefepime during admission.   Pt states she does not remember reaction but it was many years ago   • Penicillins Rash   • Sulfa Antibiotics Itching and Rash       Social History     Socioeconomic History   • Marital status:    Tobacco Use   • Smoking status: Former Smoker     Packs/day: 0.50     Quit date: 7/10/1969     Years since quittin.9   • Smokeless tobacco: Never Used   Substance and Sexual Activity   • Alcohol use: No     Comment: caffeine use - coffee 2 cups daily   • Drug use: No       Family History   Problem Relation Age of Onset   • Heart disease Mother    • Hypertension Mother    • Stroke Mother    • Diabetes Mother         mellitus   • Other Other         cardiovascular disorder        REVIEW OF SYSTEMS:   All other systems reviewed and negative.        Objective:     Vitals:    06/08/22 2101 06/08/22 2345 06/08/22 2353 06/09/22 0728   BP: 150/70  149/83 158/67   BP Location:   Left arm Left arm   Patient Position:   Lying Lying   Pulse: 74  82 60   Resp:   20 16   Temp:   97.9 °F (36.6 °C) 98.3 °F (36.8 °C)   TempSrc:   Oral Oral   SpO2: 94% (!) 87% 95% 96%   Weight:       Height:         Body mass index is 32.03 kg/m².    Intake/Output Summary (Last 24 hours) at 6/9/2022 1317  Last data filed at 6/9/2022 0446  Gross per 24 hour   Intake 300 ml   Output 2325 ml   Net -2025 ml         Constitutional: She is oriented to person, place, and time. She appears well-developed. She does not appear ill.   HENT:   Head: Normocephalic and atraumatic. Head is without contusion.   Right Ear: Hearing normal. No drainage.   Left Ear: Hearing normal. No drainage.   Nose: No nasal deformity. No epistaxis.   Eyes: Lids are normal. Right eye exhibits no exudate. Left eye exhibits no exudate.  Neck: No JVD present. Carotid bruit is not present. No tracheal deviation present. No thyroid mass and no thyromegaly present.   Cardiovascular: Normal rate, regular rhythm and normal heart sounds.    Pulses:       Posterior tibial pulses are 2+ on the right side, and 2+ on the left side.   Pulmonary/Chest: Effort normal and breath sounds normal.   Abdominal: Soft. Normal appearance and bowel sounds are normal. There is no tenderness.   Musculoskeletal: Normal range of motion.        Right shoulder: She exhibits no deformity.        Left shoulder: She exhibits no deformity.   Neurological: She is alert and oriented to person, place, and time. She has normal strength.   Skin: Skin is warm, dry and intact. No rash noted.   Psychiatric: She has a normal mood and affect. Her behavior is normal. Thought content normal.   Vitals reviewed      Lab Review:     Results from last 7 days   Lab Units 06/09/22  0413 06/07/22  0406  06/06/22  0439   SODIUM mmol/L 139   < > 137   POTASSIUM mmol/L 4.2   < > 4.0   CHLORIDE mmol/L 105   < > 104   CO2 mmol/L 21.0*   < > 20.0*   BUN mg/dL 31*   < > 22   CREATININE mg/dL 1.32*   < > 1.48*   CALCIUM mg/dL 8.9   < > 8.6   BILIRUBIN mg/dL  --   --  0.6   ALK PHOS U/L  --   --  57   ALT (SGPT) U/L  --   --  21   AST (SGOT) U/L  --   --  13   GLUCOSE mg/dL 203*   < > 139*    < > = values in this interval not displayed.         Results from last 7 days   Lab Units 06/09/22  0413   WBC 10*3/mm3 6.71   HEMOGLOBIN g/dL 10.8*   HEMATOCRIT % 33.9*   PLATELETS 10*3/mm3 92*         Results from last 7 days   Lab Units 06/09/22  0413   MAGNESIUM mg/dL 1.8     Results from last 7 days   Lab Units 06/06/22  0439   CHOLESTEROL mg/dL 101   TRIGLYCERIDES mg/dL 124   HDL CHOL mg/dL 41   LDL CHOL mg/dL 38                                       I personally viewed and interpreted the patient's EKG/Telemetry data.        Assessment and Plan:       1.  Urinary tract infection.  2.  Paroxysmal atrial fibrillation.  Continue metoprolol.  3.  Severe aortic stenosis status post aortic valve replacement with a [place] trifecta bovine pericardial valve 21 mm June 2013.  4.  Heart failure with preserved ejection fraction.  5.  History of ventricular bigeminy  6.  Abnormal EKG with nonspecific changes ST changes in the inferolateral leads and J-point elevation anteriorly.  No significant changes compared to prior.  No ischemic symptoms.  7.  Diabetes  8.  Hyperlipidemia  9.  Chronic kidney disease  10.  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    No further cardiac testing.  Okay for discharge from my standpoint.  She already has follow-up arranged with RIGO Rodney later this month.    Dasia Gomze MD  06/09/22  13:17 EDT

## 2022-06-09 NOTE — PROGRESS NOTES
"  Infectious Diseases Progress Note    Dheeraj Lynch MD     [facility]  [place]: 4 days  Patient Identification:  Name: Helena Carmen  Age: 89 y.o.  Sex: female  :  1931  MRN: [id number]         Primary Care Physician: Mariah Hickman MD            Subjective: Feeling better and stronger.  Interval History: See consultation note.    Objective:    Scheduled Meds:amLODIPine, 10 mg, Oral, Daily  cefepime, 2 g, Intravenous, Q24H  docusate sodium, 100 mg, Oral, BID  famotidine, 20 mg, Oral, BID  gabapentin, 300 mg, Oral, BID  insulin glargine, 25 Units, Subcutaneous, Nightly  insulin lispro, 0-7 Units, Subcutaneous, 4x Daily With Meals & Nightly  insulin lispro, 8 Units, Subcutaneous, TID With Meals  levothyroxine, 25 mcg, Oral, Daily  LORazepam, 0.5 mg, Oral, Nightly  metoprolol tartrate, 12.5 mg, Oral, BID  montelukast, 10 mg, Oral, Daily  polyethylene glycol, 17 g, Oral, BID AC  predniSONE, 10 mg, Oral, Q48H  rosuvastatin, 5 mg, Oral, Nightly  tamsulosin, 0.4 mg, Oral, Daily      Continuous Infusions:     Vital signs in last 24 hours:  Temp:  [97.9 °F (36.6 °C)-98.5 °F (36.9 °C)] 97.9 °F (36.6 °C)  Heart Rate:  [63-82] 82  Resp:  [16-20] 20  BP: (149-155)/(60-83) 149/83    Intake/Output:    Intake/Output Summary (Last 24 hours) at 2022 0607  Last data filed at 2022 0446  Gross per 24 hour   Intake 500 ml   Output 2325 ml   Net -1825 ml       Exam:  /83 (BP Location: Left arm, Patient Position: Lying)   Pulse 82   Temp 97.9 °F (36.6 °C) (Oral)   Resp 20   Ht 144.8 cm (57\")   Wt 67.1 kg (148 lb)   SpO2 95%   BMI 32.03 kg/m²   Patient is examined using the personal protective equipment as per guidelines from infection control for this particular patient as enacted.  Hand washing was performed before and after patient interaction.  General Appearance:    Alert, cooperative, no distress, AAOx3                          Head:    Normocephalic, without obvious abnormality, " atraumatic                           Eyes:    PERRL, conjunctivae/corneas clear, EOM's intact, both eyes                         Throat:   Lips, tongue, gums normal; oral mucosa pink and moist                           Neck:   Supple, symmetrical, trachea midline, no JVD                         Lungs:    Clear to auscultation bilaterally, respirations unlabored                 Chest Wall:    No tenderness or deformity                          Heart:  S1-S2 regular                  Abdomen:     Soft, non-tender, bowel sounds active, no masses, no                                                        organomegaly                  Extremities:   Extremities normal, atraumatic, no cyanosis or edema                        Pulses:   Pulses palpable in all extremities                            Skin:   Skin is warm and dry,  no rashes or palpable lesions                  Neurologic: Grossly nonfocal       Data Review:    I reviewed the patient's new clinical results.  Results from last 7 days   Lab Units 06/09/22  0413 06/08/22  0359 06/07/22  0406 06/06/22  0439 06/05/22  1218   WBC 10*3/mm3 6.71 7.48 6.13 7.44 14.93*   HEMOGLOBIN g/dL 10.8* 10.8* 9.6* 10.8* 12.0   PLATELETS 10*3/mm3 92* 90* 68* 64* 74*     Results from last 7 days   Lab Units 06/09/22  0413 06/08/22  1812 06/08/22  0359 06/07/22  0406 06/06/22  0439 06/05/22  1218   SODIUM mmol/L 139  --  133* 137 137 135*   POTASSIUM mmol/L 4.2 4.7 4.3 4.5 4.0 4.4   CHLORIDE mmol/L 105  --  103 104 104 98   CO2 mmol/L 21.0*  --  20.4* 21.6* 20.0* 24.0   BUN mg/dL 31*  --  29* 26* 22 22   CREATININE mg/dL 1.32*  --  1.53* 1.45* 1.48* 1.73*   CALCIUM mg/dL 8.9  --  8.7 8.7 8.6 9.3   GLUCOSE mg/dL 203*  --  191* 229* 139* 152*     Microbiology Results (last 10 days)     Procedure Component Value - Date/Time    Blood Culture - Blood, Arm, Left [[id number]]  (Normal) Collected: 06/05/22 1311    Lab Status: Preliminary result Specimen: Blood from Arm, Left Updated: 06/08/22  1317     Blood Culture No growth at 3 days    Blood Culture - Blood, Arm, Left [[id number]]  (Normal) Collected: 06/05/22 1218    Lab Status: Preliminary result Specimen: Blood from Arm, Left Updated: 06/08/22 1232     Blood Culture No growth at 3 days    Urine Culture - Urine, Urine, Catheter [[id number]]  (Abnormal)  (Susceptibility) Collected: 06/05/22 1211    Lab Status: Final result Specimen: Urine, Catheter Updated: 06/08/22 1124     Urine Culture >100,000 CFU/mL Pseudomonas aeruginosa      50,000 CFU/mL Raoultella planticola    Narrative:      mics to follow  Colonization of the urinary tract without infection is common. Treatment is discouraged unless the patient is symptomatic, pregnant, or undergoing an invasive urologic procedure.  Colonization of the urinary tract without infection is common. Treatment is discouraged unless the patient is symptomatic, pregnant, or undergoing an invasive urologic procedure.    Susceptibility      Pseudomonas aeruginosa      EFFIE      Cefepime Susceptible      Ceftazidime Susceptible      Ciprofloxacin Susceptible      Gentamicin Susceptible      Levofloxacin Susceptible      Piperacillin + Tazobactam Susceptible                       Susceptibility      Raoultella planticola      EFFIE      Ampicillin Resistant     Ampicillin + Sulbactam Susceptible      Cefazolin Susceptible      Cefepime Susceptible      Ceftazidime Susceptible      Ceftriaxone Susceptible      Gentamicin Susceptible      Levofloxacin Susceptible      Nitrofurantoin Susceptible      Piperacillin + Tazobactam Susceptible      Trimethoprim + Sulfamethoxazole Susceptible                           Respiratory Panel PCR w/COVID-19(SARS-CoV-2) LATONIA/CHARLIE/ALEJANDRO/PAD/COR/MAD/PASQUALE In-House, NP Swab in [facility], 3-4 HR TAT - Swab, Nasopharynx [[id number]]  (Normal) Collected: 06/05/22 1206    Lab Status: Final result Specimen: Swab from Nasopharynx Updated: 06/05/22 1321     ADENOVIRUS, PCR Not Detected     Coronavirus 229E  Not Detected     Coronavirus HKU1 Not Detected     Coronavirus NL63 Not Detected     Coronavirus OC43 Not Detected     COVID19 Not Detected     Human Metapneumovirus Not Detected     Human Rhinovirus/Enterovirus Not Detected     Influenza A PCR Not Detected     Influenza B PCR Not Detected     Parainfluenza Virus 1 Not Detected     Parainfluenza Virus 2 Not Detected     Parainfluenza Virus 3 Not Detected     Parainfluenza Virus 4 Not Detected     RSV, PCR Not Detected     Bordetella pertussis pcr Not Detected     Bordetella parapertussis PCR Not Detected     Chlamydophila pneumoniae PCR Not Detected     Mycoplasma pneumo by PCR Not Detected    Narrative:      In the setting of a positive respiratory panel with a viral infection PLUS a negative procalcitonin without other underlying concern for bacterial infection, consider observing off antibiotics or discontinuation of antibiotics and continue supportive care. If the respiratory panel is positive for atypical bacterial infection (Bordetella pertussis, Chlamydophila pneumoniae, or Mycoplasma pneumoniae), consider antibiotic de-escalation to target atypical bacterial infection.              Assessment: Improving    Sepsis (HCC)  1-probable Macrodantin induced fever versus  2-urinary tract infection due to indwelling catheter caused by pathogens other the Pseudomonas as patient has shown improvement on treatment that should not have addressed Pseudomonas  3-probable Pseudomonas species colonization  4-diabetes mellitus  5-chronic kidney disease  6-history of atrial fibrillation  7-legally blind due to macular degeneration  8-thrombocytopenia  9-other diagnosis per primary team.     Recommendations/Discussions:  See my discussion and recommendations on 6/6/2022.  Based on the culture data I think she could be switched to oral third-generation cephalosporin.  Catheter has been changed.  Dheeraj Lynch MD  6/9/2022  06:07 EDT    Much of this encounter note is an electronic  transcription/translation of spoken language to printed text. The electronic translation of spoken language may permit erroneous, or at times, nonsensical words or phrases to be inadvertently transcribed; Although I have reviewed the note for such errors, some may still exist

## 2022-06-09 NOTE — TELEPHONE ENCOUNTER
Patient's daughter Margaret called this morning. Patient is admitted at [facility], has been admitted since Sunday 06/05/2022.  She stated that  has seen patient and was not aware that she is patient of the [facility] and a patient of [facility].   She would like to speak with you.  Her CB is [phone]    Thanks,  Autumn HYATT [facility]

## 2022-06-09 NOTE — PROGRESS NOTES
"Daily progress note    Chief complaint  Doing better  No new complaints   Denies fever cough or shortness of breath    History of present illness  89-year-old white female who is well-known to our service with history of atrial fibrillation aortic stenosis diabetes mellitus hypertension hyperlipidemia and chronic kidney disease stage IV who was recently treated for acute on chronic diastolic CHF currently at nursing home getting PT OT nursing care who also have chronic Hoffman catheter presented to [facility] with fever chills started this morning.  Patient has been feeling poorly for last few days.  Patient denies any chest pain increase shortness of breath abdominal pain diarrhea but does have nausea vomiting.  Patient work-up in ER revealed acute UTI with sepsis admit for management.  Patient is DNR per her wishes.     REVIEW OF SYSTEMS  Unremarkable except generalized weakness      PHYSICAL EXAM  Blood pressure 158/67, pulse 60, temperature 98.3 °F (36.8 °C), temperature source Oral, resp. rate 16, height 144.8 cm (57\"), weight 67.1 kg (148 lb), SpO2 96 %.    Constitutional:       General: She is not in acute distress.  HENT:      Head: Normocephalic and atraumatic.   Eyes:      Pupils: Pupils are equal, round, and reactive to light.   Cardiovascular:      Rate and Rhythm: Normal rate and regular rhythm.      Heart sounds: Normal heart sounds.   Pulmonary:      Effort: Pulmonary effort is normal. No respiratory distress.      Breath sounds: Normal breath sounds.   Abdominal:      Palpations: Abdomen is soft.      Tenderness: There is no abdominal tenderness. There is no guarding or rebound.   Musculoskeletal:         General: Normal range of motion.      Cervical back: Normal range of motion and neck supple.   Skin:     General: Skin is warm and dry.      Findings: No rash.   Neurological:      Mental Status: She is alert and oriented to person, place, and time.      Sensory: Sensation is intact. No " sensory deficit.      Motor: No weakness.   Psychiatric:         Mood and Affect: Mood and affect normal.      LAB RESULTS  Lab Results (last 24 hours)     Procedure Component Value Units Date/Time    Blood Culture - Blood, Arm, Left [[id number]]  (Normal) Collected: 06/05/22 1311    Specimen: Blood from Arm, Left Updated: 06/09/22 1317     Blood Culture No growth at 4 days    Blood Culture - Blood, Arm, Left [[id number]]  (Normal) Collected: 06/05/22 1218    Specimen: Blood from Arm, Left Updated: 06/09/22 1232     Blood Culture No growth at 4 days    COVID PRE-OP / PRE-PROCEDURE SCREENING ORDER (NO ISOLATION) - Swab, Nasopharynx [[id number]]  (Normal) Collected: 06/09/22 1118    Specimen: Swab from Nasopharynx Updated: 06/09/22 1221    Narrative:      The following orders were created for panel order COVID PRE-OP / PRE-PROCEDURE SCREENING ORDER (NO ISOLATION) - Swab, Nasopharynx.  Procedure                               Abnormality         Status                     ---------                               -----------         ------                     COVID-19,[facility]...[[id number]]  Normal              Final result                 Please view results for these tests on the individual orders.    COVID-19,[facility] CEPHEID/LATISHA NP SWAB IN TRANSPORT MEDIA 8-12 HR TAT - Swab, Nasopharynx [[id number]]  (Normal) Collected: 06/09/22 1118    Specimen: Swab from Nasopharynx Updated: 06/09/22 1221     COVID19 Not Detected    Narrative:      Fact sheet for providers: https://www.fda.gov/media/449435/download     Fact sheet for patients: https://www.fda.gov/media/073770/download    POC Glucose Once [[id number]]  (Abnormal) Collected: 06/09/22 1059    Specimen: Blood Updated: 06/09/22 1101     Glucose 193 mg/dL      Comment: Meter: [id number] : [id number] Ramón JOHNSON       POC Glucose Once [[id number]]  (Abnormal) Collected: 06/09/22 0615    Specimen: Blood Updated: 06/09/22 0617     Glucose 209 mg/dL       Comment: Meter: [id number] : [id number] Emily JOHNSON       Basic Metabolic Panel [[id number]]  (Abnormal) Collected: 06/09/22 0413    Specimen: Blood Updated: 06/09/22 0507     Glucose 203 mg/dL      BUN 31 mg/dL      Creatinine 1.32 mg/dL      Sodium 139 mmol/L      Potassium 4.2 mmol/L      Chloride 105 mmol/L      CO2 21.0 mmol/L      Calcium 8.9 mg/dL      BUN/Creatinine Ratio 23.5     Anion Gap 13.0 mmol/L      eGFR 38.2 mL/min/1.73      Comment: National Kidney Foundation and American Society of Nephrology (ASN) Task Force recommended calculation based on the Chronic Kidney Disease Epidemiology Collaboration (CKD-EPI) equation refit without adjustment for race.       Narrative:      GFR Normal >60  Chronic Kidney Disease <60  Kidney Failure <15      Magnesium [[id number]]  (Normal) Collected: 06/09/22 0413    Specimen: Blood Updated: 06/09/22 0507     Magnesium 1.8 mg/dL     CBC & Differential [[id number]]  (Abnormal) Collected: 06/09/22 0413    Specimen: Blood Updated: 06/09/22 0456    Narrative:      The following orders were created for panel order CBC & Differential.  Procedure                               Abnormality         Status                     ---------                               -----------         ------                     CBC Auto Differential[[id number]]        Abnormal            Final result                 Please view results for these tests on the individual orders.    CBC Auto Differential [[id number]]  (Abnormal) Collected: 06/09/22 0413    Specimen: Blood Updated: 06/09/22 0456     WBC 6.71 10*3/mm3      RBC 3.75 10*6/mm3      Hemoglobin 10.8 g/dL      Hematocrit 33.9 %      MCV 90.4 fL      MCH 28.8 pg      MCHC 31.9 g/dL      RDW 14.4 %      RDW-SD 47.8 fl      MPV 11.2 fL      Platelets 92 10*3/mm3      Neutrophil % 63.0 %      Lymphocyte % 25.9 %      Monocyte % 8.2 %      Eosinophil % 1.3 %      Basophil % 0.6 %      Neutrophils, Absolute 4.22 10*3/mm3      Lymphocytes,  Absolute 1.74 10*3/mm3      Monocytes, Absolute 0.55 10*3/mm3      Eosinophils, Absolute 0.09 10*3/mm3      Basophils, Absolute 0.04 10*3/mm3     POC Glucose Once [[id number]]  (Abnormal) Collected: 06/08/22 2214    Specimen: Blood Updated: 06/08/22 2215     Glucose 267 mg/dL      Comment: Meter: [id number] : [id number] Carli Ledesma RN       Potassium [[id number]]  (Normal) Collected: 06/08/22 1812    Specimen: Blood Updated: 06/08/22 1857     Potassium 4.7 mmol/L     Magnesium [[id number]]  (Normal) Collected: 06/08/22 1812    Specimen: Blood Updated: 06/08/22 1857     Magnesium 1.9 mg/dL     POC Glucose Once [[id number]]  (Abnormal) Collected: 06/08/22 1609    Specimen: Blood Updated: 06/08/22 1610     Glucose 234 mg/dL      Comment: Meter: [id number] : [id number] Goodman Jody JOHNSON           Imaging Results (Last 24 Hours)     ** No results found for the last 24 hours. **             ECG 12 Lead  Component   Ref Range & Units 12:08 3 mo ago   QT Interval   ms 326 P  449    Resulting Agency  ECG  ECG             HEART RATE= 136  bpm  RR Interval= 441  ms  WV Interval=   ms  P Horizontal Axis=   deg  P Front Axis=   deg  QRSD Interval= 91  ms  QT Interval= 326  ms  QRS Axis= 37  deg  T Wave Axis= 164  deg  - ABNORMAL ECG -  Atrial fibrillation  Repolarization abnormality, prob rate related             Current Facility-Administered Medications:   •  amLODIPine (NORVASC) tablet 10 mg, 10 mg, Oral, Daily, Mango Arnold MD, 10 mg at 06/09/22 0924  •  cefdinir (OMNICEF) capsule 300 mg, 300 mg, Oral, Q24H, Dheeraj Lynch MD, 300 mg at 06/09/22 1307  •  docusate sodium (COLACE) capsule 100 mg, 100 mg, Oral, BID, Mango Arnold MD, 100 mg at 06/08/22 2059  •  famotidine (PEPCID) tablet 20 mg, 20 mg, Oral, BID, Mango Arnold MD, 20 mg at 06/09/22 0924  •  gabapentin (NEURONTIN) capsule 300 mg, 300 mg, Oral, BID, Mango Arnold MD, 300 mg at 06/09/22 0924  •  insulin glargine (LANTUS, SEMGLEE) injection 25 Units, 25  Units, Subcutaneous, Nightly, Kadeem Arnold MD, 25 Units at 06/08/22 2219  •  insulin lispro (ADMELOG) injection 0-7 Units, 0-7 Units, Subcutaneous, 4x Daily With Meals & Nightly, Kadeem Arnold MD, 4 Units at 06/08/22 2218  •  insulin lispro (ADMELOG) injection 8 Units, 8 Units, Subcutaneous, TID With Meals, Kadeem Arnold MD, 8 Units at 06/08/22 1737  •  levothyroxine (SYNTHROID, LEVOTHROID) tablet 25 mcg, 25 mcg, Oral, Daily, Kadeem Arnold MD, 25 mcg at 06/09/22 0923  •  LORazepam (ATIVAN) tablet 0.5 mg, 0.5 mg, Oral, Nightly, Kadeem Arnold MD, 0.5 mg at 06/08/22 2059  •  metoprolol tartrate (LOPRESSOR) tablet 12.5 mg, 12.5 mg, Oral, BID, Kadeem Arnold MD, 12.5 mg at 06/09/22 0924  •  montelukast (SINGULAIR) tablet 10 mg, 10 mg, Oral, Daily, Kadeem Arnold MD, 10 mg at 06/09/22 0923  •  polyethylene glycol (MIRALAX) packet 17 g, 17 g, Oral, BID AC, Kadeem Arnold MD, 17 g at 06/08/22 0647  •  predniSONE (DELTASONE) tablet 10 mg, 10 mg, Oral, Q48H, Kadeem Arnold MD, 10 mg at 06/08/22 0851  •  rosuvastatin (CRESTOR) tablet 5 mg, 5 mg, Oral, Nightly, Kadeem Arnold MD, 5 mg at 06/08/22 2059  •  tamsulosin (FLOMAX) 24 hr capsule 0.4 mg, 0.4 mg, Oral, Daily, Kadeem Arnold MD, 0.4 mg at 06/09/22 0923    ASSESSMENT  Acute Pseudomonas aeruginosa and Raoultella planticola UTI with sepsis  Paroxysmal atrial fibrillation  Chronic diastolic CHF  Diabetes mellitus  Hypertension  Hyperlipidemia  Aortic stenosis s/p AVR  Pulmonary hypertension  Chronic constipation  Chronic indwelling Hoffman catheter  Gastroesophageal reflux disease    PLAN  Discharge back to nursing home if okay with all  Discharge summary dictated    KADEEM ARNOLD MD

## 2022-06-09 NOTE — PROGRESS NOTES
Case Management Discharge Note      Final Note: [facility] ([facility] confirms skilled bed today) via [employer] w/c van scheduled at 5:00 PM (ID: [id number], per family request, family provided info to supply payment to [employer]). Pharmacy updated. Requested covid swab in process. Packet provided to CHARITY Pugh LCSW         Selected Continued Care - Admitted Since 6/5/2022     Destination Coordination complete.    Service Provider Selected Services Address Phone Fax Patient Preferred    [facility]  Skilled Nursing [address] [facility] [address] [phone] --          Durable Medical Equipment    No services have been selected for the patient.              Dialysis/Infusion    No services have been selected for the patient.              Home Medical Care    No services have been selected for the patient.              Therapy    No services have been selected for the patient.              Community Resources    No services have been selected for the patient.              Community & DME    No services have been selected for the patient.                Selected Continued Care - Prior Encounters Includes selections from prior encounters from 3/7/2022 to 6/9/2022    Discharged on 3/9/2022 Admission date: 3/2/2022 - Discharge disposition: Skilled Nursing Facility (DC - External)    Destination     Service Provider Selected Services Address Phone Fax Patient Preferred    [facility]  Skilled Nursing [address] [facility] [address] [phone] --                    Transportation Services  W/C Van: [facility]    Final Discharge Disposition Code: 03 - skilled nursing facility (SNF)

## 2022-06-10 LAB
BACTERIA SPEC AEROBE CULT: NORMAL
BACTERIA SPEC AEROBE CULT: NORMAL

## 2022-06-13 ENCOUNTER — TELEPHONE (OUTPATIENT)
Dept: CARDIOLOGY | Facility: HOSPITAL | Age: 87
End: 2022-06-13

## 2022-06-13 NOTE — TELEPHONE ENCOUNTER
Patient had appointment for today at 2:00 pm and did not show. Called patient's daughter Radha. She stated patient just got out of the hospital and back home so she didn't think she would make it today.   She also stated that she (Radha) is out of town in [place] and would need to speak with her siblings to see who could bring her to an appointment. She said they would call and reschedule appointment.    Autumn HYATT [facility]

## 2022-06-14 ENCOUNTER — TELEPHONE (OUTPATIENT)
Dept: ONCOLOGY | Facility: CLINIC | Age: 87
End: 2022-06-14

## 2022-06-14 NOTE — TELEPHONE ENCOUNTER
Phoned patient's nursing home, verified patient's current prednisone dose with patient's nurse. Patient currently taking prednisone 10 mg every other day.

## 2022-06-14 NOTE — TELEPHONE ENCOUNTER
Caller: Brandon benitez    Relationship to patient: Emergency Contact    Best call back number: [phone]    Type of visit: FOLLOW UP WITH NP    Requested date: 06/15 OR 06/17    Additional notes: PLEASE CALL ONCE SCHEDULED.

## 2022-06-14 NOTE — TELEPHONE ENCOUNTER
SPOKE WITH PTS DAUGHTER AND SON QUAN TO GET PT SCHEDULED WITH NP. PTS IS IN A FACILITY. QUAN ADVISED HE WILL CALL THE FACILITY WHERE PT IS TO SEE WHEN HE CAN ARRANGE TO SCHEDULE HER.

## 2022-06-14 NOTE — PROGRESS NOTES
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  She was hospitalized with UTI sepsis, presenting to the ED with fever and chills.  Cultures returned positive for Pseudomonas and Raoultella. She responded to IV Cefepime and was transitioned to oral Omnicef.  Her Hoffman catheter was exchanged as well.    We have been following the patient for ongoing thrombocytopenia undergoing very slow prednisone taper.  When last seen 4 weeks ago we had her further taper to prednisone 10 mg daily.  She was continued on this dose in the hospital.  However when discharge prednisone was further tapered to 10 mg every other day.  Platelet count at the time of discharge was stable to improved at 92,000.    As she is now reviewed back in the office, [facility]  She is accompanied by her daughter today. Platelet count currently is stable at 92,000, IPF 8.5%.  Patient is doing fairly well all things considered.  She and her daughter deny any new concerns at this time.    Objective   Vital Signs:   /72   Pulse 57   Temp 97.3 °F (36.3 °C) (Temporal)   Resp 18   SpO2 96%     Physical Exam  Constitutional:       Appearance: She is well-developed.      Comments: Elderly female no distress seated in wheelchair   Eyes:      Conjunctiva/sclera: Conjunctivae normal.   Neck:      Thyroid: No thyromegaly.   Cardiovascular:      Rate and Rhythm: Normal rate and regular rhythm.      Heart sounds: Murmur heard.     No friction rub. No gallop.   Pulmonary:      Effort: No respiratory distress.      Breath sounds: Normal breath sounds.   Abdominal:      General: Bowel sounds are normal. There is no distension.      Palpations: Abdomen is soft.      Tenderness: There is no abdominal tenderness.    Musculoskeletal:         General: Swelling present.      Comments: Trace bilateral lower extremity edema   Skin:     General: Skin is warm and dry.      Findings: No rash.   Neurological:      Mental Status: She is alert and oriented to person, place, and time.      Cranial Nerves: No cranial nerve deficit.      Motor: No abnormal muscle tone.      Deep Tendon Reflexes: Reflexes normal.   Psychiatric:         Behavior: Behavior normal.        I have reexamined the patient and the results are consistent with the previously documented exam. RIGO An       Result Review :   Results from last 7 days   Lab Units 06/15/22  1502 06/09/22  0413   WBC 10*3/mm3 7.97 6.71   NEUTROS ABS 10*3/mm3 5.16 4.22   HEMOGLOBIN g/dL 11.9* 10.8*   HEMATOCRIT % 38.1 33.9*   PLATELETS 10*3/mm3 92* 92*   IPF = 8.7%  Results from last 7 days   Lab Units 06/15/22  1502 06/09/22  0413 06/08/22  1812   SODIUM mmol/L 136 139  --    POTASSIUM mmol/L 4.3 4.2 4.7   CHLORIDE mmol/L 103 105  --    CO2 mmol/L 17.4* 21.0*  --    BUN mg/dL 34* 31*  --    CREATININE mg/dL 1.49* 1.32*  --    CALCIUM mg/dL 9.5 8.9  --    ALBUMIN g/dL 3.80  --   --    BILIRUBIN mg/dL 0.4  --   --    ALK PHOS U/L 67  --   --    ALT (SGPT) U/L 20  --   --    AST (SGOT) U/L 23  --   --    GLUCOSE mg/dL 249* 203*  --    MAGNESIUM mg/dL  --  1.8 1.9              Assessment and Plan     *Thrombocytopenia  · Patient with apparent longstanding history of thrombocytopenia  · Previous CT scan with suggestion of chronic liver disease/cirrhotic liver morphology, last CT 4/15/2019 with normal spleen  · Platelet count 3/22/2019 was 52,000 and on 1/26/2022 was 60,000  · On admission 3/2/2022, platelet count 39,000  · Additional labs 3/3/2022 with IPF elevated at 12.2% indicative of peripheral destructive process and has remained elevated in the 10-12% range.  · Additional labs 3/3/2022 with positive BECCA at 1: 320 dilution with homogeneous pattern, negative BECCA  panel  · On 3/4/2022, B12 low normal at 238, folate greater than 20, negative serum protein electrophoresis and immunoelectrophoresis with free kappa light chain 65.6, free lambda light chain 37.1 and free light chain ratio 1.77 (appropriate for degree of renal dysfunction), LDH borderline elevated 238  · CT abdomen and pelvis 3/8/2022 with liver morphology suspicious for chronic liver disease, spleen normal in size at 10.4 cm.  No other abnormalities.  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    · Unclear whether  improvement in thrombocytopenia was related to steroids, B12 replacement, or improvement in CHF/volume overload.  · Recommended decreasing prednisone dose down to 15 mg daily prior to transition to subacute nursing facility on 3/9/2022.  Patient however was discharged on prednisone 20 mg daily.  Requested that nursing facility forward labs weekly monitor platelet count and further gradually taper prednisone dose however this did not occur.  · On 3/23/2022, platelet count was 64,000.  Tapered prednisone from 20 down to 15 mg daily.  Intend to maintain platelet count above the 50-11303 range.  Consider additional treatment options with IVIG or rituximab if platelet count declines into the 30,000 range or below consistently.  · 3/31/2022 platelet count 65,000 and on 4/4/2022 prednisone was tapered down to 10 mg daily  · On 4/20/2022, platelet count of 96,000 and prednisone tapered down to 5 mg daily.  · On 4/28/2022 based on platelet count of 73,000, orders given to decrease prednisone to 5 mg QOD.  · 5/4/2022 platelets 39,000 IPF 11%.  Increase prednisone back up to 20 mg daily.  · 5/11/2022 platelets 72,000, IPF 7.9%.  Prednisone drop to 15 mg daily.  · 5/19/2022 platelets stable at 71,000, IPF 8.7%.  Decrease prednisone further to 10 mg daily and stay at this dose for the  time being.  · 6/15/2022 patient seen back today in follow-up having recently been hospitalized with UTI sepsis.  She was continued on prednisone 10 mg daily while hospitalized.  Platelet count did briefly dip to 64,000 but at the time of discharge was back to 92,000.  Prednisone was further tapered to 10 mg every other day upon discharge.  As she is reviewed back today platelet count remains stable at 92,000, IPF 8.5%.  She is doing well post hospitalization with no new concerns at this time.    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  · 6/15/2022 Hgb today improved at 11.9.    *Borderline B12 deficiency  · B12 level on 3/4/2022 was 238  · Patient initiated B12 1000 mcg IM injection daily x3 days and oral B12 1000 mcg daily  · Patient is currently continuing on oral B12 1000 mcg daily at the nursing facility     *NADER/CKD3/  · Baseline creatinine 1.5-2  · Creatinine on admission 3/2/2022 was 2.79, subsequently improved  · Patient continues routine follow-up with nephrology  · 5/4/2022 renal function worsening with creatinine up to 2.8, BUN 72.  Lasix stopped per nephrology.  · 6/15/2022 creatinine 1.4, BUN 34. GFR 36     *Acute on chronic diastolic CHF  · History of severe aortic stenosis status post aortic valve replacement in June 2013  · Patient admitted with progressive generalized weakness and dyspnea on exertion  · 5/4/2022 renal function worsening with creatinine up to 2.8, BUN 72.  Lasix stopped per nephrology.  · Patient with just trace lower extremity edema with no significant worsening fluid accumulation thankfully.     *Diabetes mellitus type 2  · Exacerbated by steroids     *Possible chronic liver disease  · Prior CTs with notation of cirrhotic  liver morphology  · CT abdomen pelvis 4/15/2019 showed liver with a mildly shrunken appearance concerning for chronic liver disease.  Spleen however was normal in size.  · LFTs normal on 3/2/2022  · CT abdomen and pelvis 3/8/2022 with liver morphology suspicious for chronic liver disease, spleen normal in size at 10.4 cm.  No other abnormalities.  · Significance of the liver appearance on scans is unclear.  · LFTs have normalized    *GI prophylaxis  · Patient is currently receiving Pepcid 10 mg twice daily    *Pulmonary nodule  · Patient underwent CT scan at [facility] on 9/7/2021 that showed a 4 mm subpleural left lower lobe nodule  · Patient was scheduled for upcoming CT chest with thoracic surgery however declined to proceed with the scan.  Given her age and limited ability to treat a malignancy and with a high probability that this is a benign finding scan was canceled.    *Sacral and left heel ulcerations  · Patient has developed sacral and left heel ulcerations.  Left heel was debrided by wound care team at the nursing facility. Second shallow open area on the heel also being treated.     Plan:  1. Further taper prednisone to 5 mg every other day.  This was both discussed with patient and her daughter and orders are also being faxed to [facility].  2. Continue oral B12 replacement 1000 mcg daily  3. Continue Pepcid 10 mg twice daily for GI prophylaxis.  4. We have requested CBC to be done weekly at [facility] the next 3 weeks with results to be faxed.  Based on results we will determine if we can further taper her prednisone hopefully to take her completely off at some point in the near future.  5. To otherwise return in 1 month for follow-up with Dr. Sherman with CBC, IPF and CMP.        I spent 38 minutes caring for Helena on this date of service. This time includes time spent by me in the following activities: preparing for the visit, reviewing tests, obtaining and/or reviewing a separately  obtained history, performing a medically appropriate examination and/or evaluation, counseling and educating the patient/family/caregiver, referring and communicating with other health care professionals, documenting information in the medical record and care coordination

## 2022-06-15 ENCOUNTER — TELEPHONE (OUTPATIENT)
Dept: CARDIOLOGY | Facility: HOSPITAL | Age: 87
End: 2022-06-15

## 2022-06-15 ENCOUNTER — OFFICE VISIT (OUTPATIENT)
Dept: ONCOLOGY | Facility: CLINIC | Age: 87
End: 2022-06-15

## 2022-06-15 ENCOUNTER — LAB (OUTPATIENT)
Dept: LAB | Facility: HOSPITAL | Age: 87
End: 2022-06-15

## 2022-06-15 VITALS
TEMPERATURE: 97.3 F | DIASTOLIC BLOOD PRESSURE: 72 MMHG | HEART RATE: 57 BPM | OXYGEN SATURATION: 96 % | RESPIRATION RATE: 18 BRPM | SYSTOLIC BLOOD PRESSURE: 149 MMHG

## 2022-06-15 DIAGNOSIS — D69.6 THROMBOCYTOPENIA: ICD-10-CM

## 2022-06-15 DIAGNOSIS — N28.9 RENAL DYSFUNCTION: ICD-10-CM

## 2022-06-15 DIAGNOSIS — D69.6 THROMBOCYTOPENIA: Primary | ICD-10-CM

## 2022-06-15 DIAGNOSIS — D69.3 CHRONIC ITP (IDIOPATHIC THROMBOCYTOPENIA): ICD-10-CM

## 2022-06-15 LAB
ALBUMIN SERPL-MCNC: 3.8 G/DL (ref 3.5–5.2)
ALBUMIN/GLOB SERPL: 1.3 G/DL (ref 1.1–2.4)
ALP SERPL-CCNC: 67 U/L (ref 38–116)
ALT SERPL W P-5'-P-CCNC: 20 U/L (ref 0–33)
ANION GAP SERPL CALCULATED.3IONS-SCNC: 15.6 MMOL/L (ref 5–15)
AST SERPL-CCNC: 23 U/L (ref 0–32)
BASOPHILS # BLD AUTO: 0.05 10*3/MM3 (ref 0–0.2)
BASOPHILS NFR BLD AUTO: 0.6 % (ref 0–1.5)
BILIRUB SERPL-MCNC: 0.4 MG/DL (ref 0.2–1.2)
BUN SERPL-MCNC: 34 MG/DL (ref 6–20)
BUN/CREAT SERPL: 22.8 (ref 7.3–30)
CALCIUM SPEC-SCNC: 9.5 MG/DL (ref 8.5–10.2)
CHLORIDE SERPL-SCNC: 103 MMOL/L (ref 98–107)
CO2 SERPL-SCNC: 17.4 MMOL/L (ref 22–29)
CREAT SERPL-MCNC: 1.49 MG/DL (ref 0.6–1.1)
DEPRECATED RDW RBC AUTO: 50.1 FL (ref 37–54)
EGFRCR SERPLBLD CKD-EPI 2021: 33 ML/MIN/1.73
EOSINOPHIL # BLD AUTO: 0.1 10*3/MM3 (ref 0–0.4)
EOSINOPHIL NFR BLD AUTO: 1.3 % (ref 0.3–6.2)
ERYTHROCYTE [DISTWIDTH] IN BLOOD BY AUTOMATED COUNT: 14.8 % (ref 12.3–15.4)
GLOBULIN UR ELPH-MCNC: 3 GM/DL (ref 1.8–3.5)
GLUCOSE SERPL-MCNC: 249 MG/DL (ref 74–124)
HCT VFR BLD AUTO: 38.1 % (ref 34–46.6)
HGB BLD-MCNC: 11.9 G/DL (ref 12–15.9)
IMM GRANULOCYTES # BLD AUTO: 0.06 10*3/MM3 (ref 0–0.05)
IMM GRANULOCYTES NFR BLD AUTO: 0.8 % (ref 0–0.5)
LYMPHOCYTES # BLD AUTO: 1.92 10*3/MM3 (ref 0.7–3.1)
LYMPHOCYTES NFR BLD AUTO: 24.1 % (ref 19.6–45.3)
MCH RBC QN AUTO: 29 PG (ref 26.6–33)
MCHC RBC AUTO-ENTMCNC: 31.2 G/DL (ref 31.5–35.7)
MCV RBC AUTO: 92.9 FL (ref 79–97)
MONOCYTES # BLD AUTO: 0.68 10*3/MM3 (ref 0.1–0.9)
MONOCYTES NFR BLD AUTO: 8.5 % (ref 5–12)
NEUTROPHILS NFR BLD AUTO: 5.16 10*3/MM3 (ref 1.7–7)
NEUTROPHILS NFR BLD AUTO: 64.7 % (ref 42.7–76)
NRBC BLD AUTO-RTO: 0 /100 WBC (ref 0–0.2)
PLATELET # BLD AUTO: 92 10*3/MM3 (ref 140–450)
PLATELETS.RETICULATED NFR BLD AUTO: 8.5 % (ref 0.9–6.5)
PMV BLD AUTO: 11.4 FL (ref 6–12)
POTASSIUM SERPL-SCNC: 4.3 MMOL/L (ref 3.5–4.7)
PROT SERPL-MCNC: 6.8 G/DL (ref 6.3–8)
RBC # BLD AUTO: 4.1 10*6/MM3 (ref 3.77–5.28)
SODIUM SERPL-SCNC: 136 MMOL/L (ref 134–145)
WBC NRBC COR # BLD: 7.97 10*3/MM3 (ref 3.4–10.8)

## 2022-06-15 PROCEDURE — 85055 RETICULATED PLATELET ASSAY: CPT

## 2022-06-15 PROCEDURE — 99214 OFFICE O/P EST MOD 30 MIN: CPT | Performed by: NURSE PRACTITIONER

## 2022-06-15 PROCEDURE — 80053 COMPREHEN METABOLIC PANEL: CPT

## 2022-06-15 PROCEDURE — 85025 COMPLETE CBC W/AUTO DIFF WBC: CPT

## 2022-06-15 NOTE — TELEPHONE ENCOUNTER
Called patient's son Brandon to see if we could schedule an appointment for patient due to her missing her appointment on 06/13/2022.  No answer. Left Voicemail with details to return call.    Autumn LOUISE

## 2022-06-15 NOTE — TELEPHONE ENCOUNTER
Patient's son Brandon returned phone call. He stated patient just got out of the hospital not long ago, as well as patient's sister just passed away. He stated he would call back to reschedule her appointment that was missed, possibly appointment for next week.    Autumn HYATT [facility]

## 2022-06-16 ENCOUNTER — TELEPHONE (OUTPATIENT)
Dept: ONCOLOGY | Facility: CLINIC | Age: 87
End: 2022-06-16

## 2022-06-16 NOTE — TELEPHONE ENCOUNTER
Phoned patient's nursing home to relay new orders from Olga SIERRA/Dr. Sherman. Spoke with patient's nurse Olga. Gave verbal orders to check a weekly CBC x4 beginning next week, and to fax results to our office, and also to change patient's prednisone dosing to 5 mg every other day. Olga mullins/fito orders.

## 2022-06-21 LAB — QT INTERVAL: 447 MS

## 2022-06-27 ENCOUNTER — OFFICE VISIT (OUTPATIENT)
Dept: CARDIOLOGY | Facility: CLINIC | Age: 87
End: 2022-06-27

## 2022-06-27 VITALS
HEART RATE: 81 BPM | HEIGHT: 57 IN | DIASTOLIC BLOOD PRESSURE: 70 MMHG | SYSTOLIC BLOOD PRESSURE: 120 MMHG | WEIGHT: 161 LBS | BODY MASS INDEX: 34.73 KG/M2

## 2022-06-27 DIAGNOSIS — I49.3 PVC (PREMATURE VENTRICULAR CONTRACTION): ICD-10-CM

## 2022-06-27 DIAGNOSIS — I50.32 CHRONIC HEART FAILURE WITH PRESERVED EJECTION FRACTION: ICD-10-CM

## 2022-06-27 DIAGNOSIS — I27.20 PULMONARY HYPERTENSION: ICD-10-CM

## 2022-06-27 DIAGNOSIS — H54.8 LEGALLY BLIND: ICD-10-CM

## 2022-06-27 DIAGNOSIS — I35.0 NONRHEUMATIC AORTIC VALVE STENOSIS: Primary | ICD-10-CM

## 2022-06-27 DIAGNOSIS — I10 ESSENTIAL HYPERTENSION: ICD-10-CM

## 2022-06-27 DIAGNOSIS — N18.32 STAGE 3B CHRONIC KIDNEY DISEASE: ICD-10-CM

## 2022-06-27 DIAGNOSIS — D69.6 THROMBOCYTOPENIA: ICD-10-CM

## 2022-06-27 DIAGNOSIS — I34.0 NONRHEUMATIC MITRAL VALVE REGURGITATION: ICD-10-CM

## 2022-06-27 PROCEDURE — 93000 ELECTROCARDIOGRAM COMPLETE: CPT | Performed by: NURSE PRACTITIONER

## 2022-06-27 PROCEDURE — 99214 OFFICE O/P EST MOD 30 MIN: CPT | Performed by: NURSE PRACTITIONER

## 2022-06-27 NOTE — PROGRESS NOTES
[facility] CARDIOLOGY  [address]  [facility] [address]  [facility] [address]  Phone: [phone]      Patient Name: Helena Carmen  :1931  Age: 89 y.o.  Primary Cardiologist: Beth Butcher MD  Encounter Provider:  RIGO Olivera      Chief Complaint     Chief Complaint: Leg swelling    SUBJECTIVE     History of Present Illness:  Helena Carmen is a  89 y.o. 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  She is followed in our office by Dr. Butcher for aortic insufficiency and diastolic heart failure.     22 Follow-up:  She is here for hospital follow-up and I am seeing her for the first time today.  He was seen by her nephrologist, Dr. Art Wick, in May 2022 and noted to have elevated creatinine; she was euvolemic and her diuretics were stopped.  She saw Dr. Fleming later that month was compensated at that time; she remained off diuretics at that time.  She was hospitalized  through 2022 for urosepsis.  She has a chronic indwelling Hoffman catheter for urinary retention which was changed and she was treated with the IV antibiotics.  She was evaluated by cardiology for possible abnormal ECG which was found to be stable.    Since hospital discharge she is feeling well.  She has gotten intermittent IV Lasix at [facility] for changes in weight; she is not on oral diuretics.  She anticipates discharge from [facility] to home on Friday of this week.  She will have home health aide staying with her and family staying with her as well.  She denies chest pain, her breathing is comfortable, no orthopnea, no palpitations, no syncope, and stable leg swelling.  She is excited about returning home.    Below is a summary of pertinent cardiology findings:  • She was originally referred for pericardial effusion noted on CT scan.  • Stress PET study in 2010 showed  no ischemia.  Echocardiogram showed EF 67%, mild concentric left ventricular hypertrophy, grade 1A diastolic dysfunction, moderate to marked left atrial enlargement, moderate aortic stenosis with peak gradient 42 mmHg and mean gradient 24 mmHg, mild mitral and tricuspid insufficiency, and small pericardial effusion.  • Echocardiogram March 2013 showed EF 58%, severe aortic stenosis with valvular area of 0.2 mm³ and peak gradient 71 mmHg with mean gradient 41 mmHg, moderate mitral insufficiency, severe left atrial enlargement, right ventricle systolic pressure 45 mmHg, and grade 2 diastolic dysfunction.  • Was referred to valve clinic and had cardiac catheterization after the March 2013 echocardiogram; cardiac cath showed minimal coronary artery disease.  On June 18, 2013 she had a 21 mm Saint Mitch trifecta bovine pericardial valve placed.  Post-operatively she had atrial fibrillation and some PVCs; Holter monitor showed no evidence of atrial fibrillation.  • 2D echocardiogram on August 1, 2003 showed EF 51%, mild to moderate left ventricular hypertrophy, grade 2 diastolic dysfunction, moderate to severe left atrial enlargement, mild to moderate mitral insufficiency, mild tricuspid insufficiency, and a tissue type aortic valve which was functioning well without stenosis or insufficiency.  This echo was done while she was hospitalized with C. difficile colitis and UTI.  • Echocardiogram on June 7, 2019 showed EF 56%, grade 2 diastolic dysfunction, mild LVH, left atrium moderately to severely dilated, moderate mitral insufficiency, moderate tricuspid insufficiency, RVSP at 69 mmHg, and normal functioning bioprosthetic aortic valve.  • Was seen for worsening dyspnea with exertion in December 2021.  Echocardiogram January 2022 showed LVEF 60%, diastolic dysfunction consistent with age, bioprosthetic aortic valve present, peak and mean gradients elevated, and mild tricuspid insufficiency.  • She was admitted March 2  through 2022 with acute on chronic diastolic heart failure exacerbation and NADER: She was discharged home on 40 mg Lasix every other day and daily spironolactone.  She was discharged to [facility] and has been following with heart failure clinic.    Past Medical History:   Diagnosis Date   • Aortic valve stenosis    • Back pain    • CKD (chronic kidney disease)    • Colitis due to Clostridioides difficile 2022   • Diastolic dysfunction    • Diverticulosis    • Exertional shortness of breath    • Heart disease    • Hiatal hernia    • Hyperlipidemia    • Hypertension    • Hyperthyroidism    • Hypertriglyceridemia 2018   • Hypothyroidism    • Left ventricular hypertrophy    • Legally blind    • Liver disease    • Macular degeneration    • Mitral regurgitation    • Osteoarthritis of hip    • Pancreatitis 2022   • Paroxysmal atrial fibrillation (HCC)    • Premature ventricular contractions    • Pulmonary hypertension (HCC)    • Renal insufficiency syndrome    • Type 2 diabetes mellitus (HCC)    • Uterine cancer (HCC)          Past Surgical History:   Procedure Laterality Date   • AORTIC VALVE REPAIR/REPLACEMENT     • CATARACT EXTRACTION      ,    • ENDOSCOPY  08/15/2014    no gross lesions in stomach/duodenum, erythrematous mucosa in stomach   • HYSTERECTOMY     • STERNOTOMY           Social History     Socioeconomic History   • Marital status:    Tobacco Use   • Smoking status: Former Smoker     Packs/day: 0.50     Quit date: 7/10/1969     Years since quittin.0   • Smokeless tobacco: Never Used   Substance and Sexual Activity   • Alcohol use: No     Comment: caffeine use - coffee 2 cups daily   • Drug use: No         Review of Systems     Review of Systems   Cardiovascular: Positive for dyspnea on exertion and leg swelling. Negative for chest pain, irregular heartbeat, near-syncope, orthopnea, palpitations and syncope.         Medications     Allergies as  of 06/27/2022 - Reviewed 06/27/2022   Allergen Reaction Noted   • Erythromycin Unknown (See Comments) and Other (See Comments) 07/10/2014   • Cephalexin Other (See Comments) and Unknown (See Comments) 10/21/2014   • Penicillins Rash 11/16/2015   • Sulfa antibiotics Itching and Rash 11/16/2015         Current Outpatient Medications:   •  amLODIPine (NORVASC) 10 MG tablet, Take 1 tablet by mouth daily., Disp: , Rfl:   •  docusate sodium 100 MG capsule, Take 1 capsule by mouth 2 (Two) Times a Day for 30 days., Disp: 60 capsule, Rfl: 0  •  famotidine (PEPCID) 10 MG tablet, Take 10 mg by mouth 2 (Two) Times a Day., Disp: , Rfl:   •  insulin glargine (LANTUS, SEMGLEE) 100 UNIT/ML injection, Inject 30 Units under the skin into the appropriate area as directed Every Night for 30 days., Disp: 100 mL, Rfl: 0  •  Insulin Lispro (ADMELOG SOLOSTAR SC), Inject 10 Units under the skin into the appropriate area as directed 3 (Three) Times a Day With Meals., Disp: , Rfl:   •  insulin lispro (humaLOG) 100 UNIT/ML injection, Inject 0-10 Units under the skin into the appropriate area as directed 3 (Three) Times a Day Before Meals. 2 units for every 50 > 150, Disp: , Rfl:   •  levothyroxine (SYNTHROID, LEVOTHROID) 25 MCG tablet, Take 1 tablet by mouth daily., Disp: , Rfl:   •  metoprolol tartrate (LOPRESSOR) 25 MG tablet, Take 12.5 mg by mouth 2 (Two) Times a Day., Disp: , Rfl:   •  montelukast (SINGULAIR) 10 MG tablet, Take 1 tablet by mouth Every Night., Disp: , Rfl:   •  polyethylene glycol (MIRALAX) 17 GM/SCOOP powder, Take 17 g by mouth Daily., Disp: , Rfl:   •  predniSONE (DELTASONE) 5 MG tablet, Take 10 mg by mouth Every Other Day., Disp: , Rfl:   •  rosuvastatin (CRESTOR) 5 MG tablet, Take 1 tablet by mouth Daily., Disp: , Rfl:   •  tamsulosin (FLOMAX) 0.4 MG capsule 24 hr capsule, Take 0.4 mg by mouth every night at bedtime., Disp: , Rfl:   •  gabapentin (NEURONTIN) 300 MG capsule, Take 1 capsule by mouth 2 (Two) Times a Day  "for 3 days., Disp: 6 capsule, Rfl: 0        OBJECTIVE     Vital Signs:   /70   Pulse 81   Ht 144.8 cm (57\")   Wt 73 kg (161 lb)   BMI 34.84 kg/m²       Weight:  Wt Readings from Last 3 Encounters:   22 73 kg (161 lb)   22 67.1 kg (148 lb)   22 72.6 kg (160 lb)     Body mass index is 34.84 kg/m².        Physical Exam     Physical Exam  Constitutional:       General: She is not in acute distress.  HENT:      Head: Normocephalic and atraumatic.      Mouth/Throat:      Mouth: Mucous membranes are moist.   Eyes:      General: No scleral icterus.     Extraocular Movements: Extraocular movements intact.      Conjunctiva/sclera: Conjunctivae normal.      Pupils: Pupils are equal, round, and reactive to light.   Cardiovascular:      Rate and Rhythm: Normal rate and regular rhythm.      Pulses: Normal pulses.      Heart sounds: S1 normal and S2 normal.   Pulmonary:      Effort: No respiratory distress.      Breath sounds: Normal breath sounds. No wheezing, rhonchi or rales.   Abdominal:      General: Bowel sounds are normal. There is no distension.      Palpations: Abdomen is soft.      Tenderness: There is no abdominal tenderness.   Musculoskeletal:         General: Normal range of motion.      Cervical back: Normal range of motion and neck supple.      Right lower le+ Pitting Edema present.      Left lower le+ Pitting Edema present.      Comments: In wheelchair   Skin:     General: Skin is warm and dry.      Coloration: Skin is not jaundiced.   Neurological:      Mental Status: She is alert and oriented to person, place, and time.   Psychiatric:         Mood and Affect: Mood normal.         Reviewed Data     Result Review :  The following data was reviewed by RIGO Olivera on 22:  • Labs on Tosin 15, 2022 showed creatinine 1.5, sodium 136, potassium 4.3, hemoglobin 11.9, platelet count 92.  · Lipid panel in 2022 showed total cholesterol 101, HDL 41, LDL 38, " and triglycerides 124.        ECG 12 Lead    Date/Time: 6/27/2022 3:01 PM  Performed by: Sima Blum APRN  Authorized by: Sima Blum APRN   Comparison: compared with previous ECG from 6/9/2022  Comparison to previous ECG: T wave abnormalities consistent with ECG from March and May 2022  Rhythm: sinus rhythm  Ectopy: atrial premature contractions  Rate: normal  BPM: 81  Conduction: conduction normal  ST Elevation: aVR  ST Depression: I, II, III, aVF, V4, V5 and V6  T inversion: II and III  Other findings: non-specific ST-T wave changes    Clinical impression: abnormal EKG            Assessment and Plan        Assessment and Plan     Assessment:  1. Nonrheumatic aortic valve stenosis    2. Nonrheumatic mitral valve regurgitation    3. Chronic heart failure with preserved ejection fraction (HCC)    4. Pulmonary hypertension (HCC)    5. PVC (premature ventricular contraction)    6. Essential hypertension    7. Stage 3b chronic kidney disease ([facility])    8. Thrombocytopenia ([facility])    9. Legally blind         1. Aortic valve stenosis: June 18, 2013 she had 21 mm Saint Mitch trifecta bovine pericardial valve placed.  Echocardiogram January 2022 showed normal functioning bioprosthetic aortic valve but with mean and peak gradients elevated.  Diuretics were adjusted at that time but currently she is not taking p.o. diuretics.  2. Mitral valve insufficiency: Moderate insufficiency noted on echocardiogram from June 2019.  3. Chronic diastolic heart failure: Left ventricular diastolic function noted to be consistent with age on echocardiogram January 2022.  She appears compensated by exam today.  She has been getting intermittent IV Lasix at [place] but currently is not on an oral diuretic regimen.  She has previously followed with the heart failure clinic.  4. Pulmonary hypertension: Severe on echocardiogram from June 2019.  5. PVCs: These are chronic and stable she is on 12.5 mg metoprolol tartrate  twice daily.  6. Hypertension: At goal on current regimen.  7. CKD 3B: Renal function was stable while she was hospitalized in early June 2022.  She follows with Dr. Art Wick.  8. Thrombocytopenia: Platelet count stable while hospitalized in June 2022.  She has followed with hematology.  9. 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    Plan:  1. I will touch base with Farida to see how often she is getting IV diuretics and prescribe an oral diuretic for her to take at home.  2. After diuretics resumed we will plan to check renal function within 1 week and have her plugged back in to the heart failure clinic for close follow-up.  3. She will keep her July 26, 2022 appointment with Dr. Butcher.        Follow Up:  Return for Keep 7/26/22 appt with Dr. Butcher.  Orders Placed This Encounter   Procedures   • ECG 12 Lead          I appreciate the opportunity to participate in this patient's care.    Thank you,  RIGO Vallejo

## 2022-06-30 RX ORDER — FUROSEMIDE 20 MG/1
20 TABLET ORAL DAILY
Qty: 30 TABLET | Refills: 11 | Status: SHIPPED | OUTPATIENT
Start: 2022-06-30 | End: 2022-12-25 | Stop reason: HOSPADM

## 2022-07-11 ENCOUNTER — HOSPITAL ENCOUNTER (OUTPATIENT)
Dept: CARDIOLOGY | Facility: HOSPITAL | Age: 87
Discharge: HOME | End: 2022-07-11
Admitting: NURSE PRACTITIONER

## 2022-07-11 VITALS
OXYGEN SATURATION: 98 % | WEIGHT: 163 LBS | HEIGHT: 57 IN | DIASTOLIC BLOOD PRESSURE: 60 MMHG | BODY MASS INDEX: 35.17 KG/M2 | SYSTOLIC BLOOD PRESSURE: 130 MMHG | HEART RATE: 64 BPM

## 2022-07-11 DIAGNOSIS — Z79.4 TYPE 2 DIABETES MELLITUS WITH STAGE 4 CHRONIC KIDNEY DISEASE, WITH LONG-TERM CURRENT USE OF INSULIN: ICD-10-CM

## 2022-07-11 DIAGNOSIS — E11.22 TYPE 2 DIABETES MELLITUS WITH STAGE 4 CHRONIC KIDNEY DISEASE, WITH LONG-TERM CURRENT USE OF INSULIN: ICD-10-CM

## 2022-07-11 DIAGNOSIS — N18.4 TYPE 2 DIABETES MELLITUS WITH STAGE 4 CHRONIC KIDNEY DISEASE, WITH LONG-TERM CURRENT USE OF INSULIN: ICD-10-CM

## 2022-07-11 DIAGNOSIS — I48.0 PAROXYSMAL ATRIAL FIBRILLATION: ICD-10-CM

## 2022-07-11 DIAGNOSIS — I10 ESSENTIAL HYPERTENSION: ICD-10-CM

## 2022-07-11 DIAGNOSIS — N18.32 STAGE 3B CHRONIC KIDNEY DISEASE: ICD-10-CM

## 2022-07-11 DIAGNOSIS — I50.32 CHRONIC HEART FAILURE WITH PRESERVED EJECTION FRACTION: Primary | ICD-10-CM

## 2022-07-11 DIAGNOSIS — I27.20 PULMONARY HYPERTENSION: ICD-10-CM

## 2022-07-11 PROBLEM — A41.9 SEPSIS (HCC): Status: RESOLVED | Noted: 2022-06-05 | Resolved: 2022-07-11

## 2022-07-11 PROBLEM — Z79.891 LONG TERM (CURRENT) USE OF OPIATE ANALGESIC: Status: RESOLVED | Noted: 2022-01-26 | Resolved: 2022-07-11

## 2022-07-11 LAB
ANION GAP SERPL CALCULATED.3IONS-SCNC: 13 MMOL/L (ref 5–15)
BUN SERPL-MCNC: 22 MG/DL (ref 8–23)
BUN/CREAT SERPL: 15 (ref 7–25)
CALCIUM SPEC-SCNC: 9.2 MG/DL (ref 8.2–9.6)
CHLORIDE SERPL-SCNC: 104 MMOL/L (ref 98–107)
CO2 SERPL-SCNC: 23 MMOL/L (ref 22–29)
CREAT SERPL-MCNC: 1.47 MG/DL (ref 0.57–1)
EGFRCR SERPLBLD CKD-EPI 2021: 33.6 ML/MIN/1.73
GLUCOSE SERPL-MCNC: 164 MG/DL (ref 65–99)
NT-PROBNP SERPL-MCNC: 1468 PG/ML (ref 0–1800)
POTASSIUM SERPL-SCNC: 4.2 MMOL/L (ref 3.5–5.2)
SODIUM SERPL-SCNC: 140 MMOL/L (ref 136–145)

## 2022-07-11 PROCEDURE — 80048 BASIC METABOLIC PNL TOTAL CA: CPT

## 2022-07-11 PROCEDURE — 99214 OFFICE O/P EST MOD 30 MIN: CPT | Performed by: NURSE PRACTITIONER

## 2022-07-11 PROCEDURE — 83880 ASSAY OF NATRIURETIC PEPTIDE: CPT

## 2022-07-11 PROCEDURE — G0463 HOSPITAL OUTPT CLINIC VISIT: HCPCS

## 2022-07-11 RX ORDER — ACETAMINOPHEN 325 MG/1
650 TABLET ORAL EVERY 6 HOURS PRN
Status: ON HOLD | COMMUNITY
End: 2022-12-20

## 2022-07-11 RX ORDER — LORAZEPAM 0.5 MG/1
TABLET ORAL
COMMUNITY
Start: 2022-07-06 | End: 2022-08-29 | Stop reason: HOSPADM

## 2022-07-11 NOTE — ADDENDUM NOTE
Encounter addended by: Claudia Mason RN on: 7/11/2022 2:24 PM   Actions taken: Charge Capture section accepted

## 2022-07-11 NOTE — PROGRESS NOTES
[facility] HEART FAILURE      Patient Name: Helena Carmen  :1931  Age: 89 y.o.  Sex: female  Referring Provider: Evi eFnton APRN   Primary Cardiologist: Beth Butcher MD  Encounter Provider:  RIGO Lowe      Chief Complaint:   Chief Complaint   Patient presents with   • Congestive Heart Failure         Congestive Heart Failure  Associated symptoms include fatigue and shortness of breath. Pertinent negatives include no chest pain, palpitations or unexpected weight change.    this 89-year-old female, new to this provider, comes today for further evaluation regarding her chronic diastolic heart failure.  Current diagnoses to include CKD 4, aortic valve disease status post history of AVR with tissue valve, hyperlipidemia, hypertension, hiatal hernia, legally blind, paroxysmal atrial fibrillation, pulmonary hypertension, type 2 diabetes mellitus, history of uterine cancer.    Her history of diastolic dysfunction goes back as far as at least .  Repeat echocardiogram 2019 revealed LVEF of 56% with grade 2 LV diastolic dysfunction, RVSP of 69 mmHg, trivial pericardial effusion noted at that time thus Lasix was started.    It is noted that during appointment with RIGO late 2022 that she had been off of spironolactone for 1 to 2 weeks and with that had increased bilateral lower extremity edema as well as abdominal distention.  She received 1 dose of IV Lasix in clinic on 2022 and oral Lasix was restarted at 40 mg twice daily.  Nephrology, of note, stopped losartan, increased hydralazine, and decreased Lasix on 2022.    Admission from 3/2/2022 to 3/9/2022 was noted with acute on chronic exacerbation of her diastolic heart failure and acute kidney injury.  She has since been treated with Lasix 40 mg every other day and daily spironolactone per nephrology recommendation.    She was rehospitalized in 2022 and has since been staying at [facility].  She was  seen by Shikha Blum June 27, 2022 who has been managing her diuretic therapy since.  She is doing very well at home and is here today with her daughter and granddaughter.  She is now home from [place] and staying with her daughter.  She has no active complaints regarding her heart failure at this time.      The following portions of the patient's history were reviewed and updated as appropriate: allergies, current medications, past family history, past medical history, past social history, past surgical history and problem list.    Current Outpatient Medications   Medication Sig Dispense Refill   • acetaminophen (TYLENOL) 325 MG tablet Take 650 mg by mouth Every 6 (Six) Hours As Needed.     • amLODIPine (NORVASC) 10 MG tablet Take 1 tablet by mouth daily.     • famotidine (PEPCID) 10 MG tablet Take 10 mg by mouth 2 (Two) Times a Day.     • furosemide (LASIX) 20 MG tablet Take 1 tablet by mouth Daily. 30 tablet 11   • gabapentin (NEURONTIN) 300 MG capsule Take 1 capsule by mouth 2 (Two) Times a Day for 3 days. 6 capsule 0   • insulin glargine (LANTUS, SEMGLEE) 100 UNIT/ML injection Inject 30 Units under the skin into the appropriate area as directed Every Night for 30 days. 100 mL 0   • insulin lispro (humaLOG) 100 UNIT/ML injection Inject 0-10 Units under the skin into the appropriate area as directed 3 (Three) Times a Day Before Meals. 2 units for every 50 > 150     • levothyroxine (SYNTHROID, LEVOTHROID) 25 MCG tablet Take 1 tablet by mouth daily.     • LORazepam (ATIVAN) 0.5 MG tablet      • metoprolol tartrate (LOPRESSOR) 25 MG tablet Take 12.5 mg by mouth 2 (Two) Times a Day.     • montelukast (SINGULAIR) 10 MG tablet Take 1 tablet by mouth Every Night.     • polyethylene glycol (MIRALAX) 17 GM/SCOOP powder Take 17 g by mouth Daily.     • predniSONE (DELTASONE) 5 MG tablet Take 10 mg by mouth Every Other Day.     • rosuvastatin (CRESTOR) 5 MG tablet Take 1 tablet by mouth Daily.     • tamsulosin  (FLOMAX) 0.4 MG capsule 24 hr capsule Take 0.4 mg by mouth every night at bedtime.       No current facility-administered medications for this encounter.       Past Medical History:   Diagnosis Date   • Aortic valve stenosis     s/p tissue AVR   • Back pain    • CKD (chronic kidney disease)    • Colitis due to Clostridioides difficile 01/26/2022   • Diastolic dysfunction     Grade 2 per echocardiogram 2013   • Diverticulosis    • Exertional shortness of breath    • Heart disease    • Hiatal hernia    • Hyperlipidemia    • Hypertension    • Hyperthyroidism    • Hypertriglyceridemia 05/31/2018   • Hypothyroidism    • Left ventricular hypertrophy    • Legally blind    • Liver disease    • Macular degeneration    • Mitral regurgitation    • Osteoarthritis of hip    • Pancreatitis 01/26/2022   • Paroxysmal atrial fibrillation (HCC)    • Premature ventricular contractions    • Pulmonary hypertension (HCC)    • Renal insufficiency syndrome    • Type 2 diabetes mellitus (HCC)    • Uterine cancer (HCC)        Past Surgical History:   Procedure Laterality Date   • AORTIC VALVE REPAIR/REPLACEMENT     • CATARACT EXTRACTION      1970, 1999   • ENDOSCOPY  08/15/2014    no gross lesions in stomach/duodenum, erythrematous mucosa in stomach   • HYSTERECTOMY  2007   • STERNOTOMY         Physical Exam  Vitals and nursing note reviewed.   Constitutional:       General: She is not in acute distress.     Appearance: She is well-developed. She is obese. She is not ill-appearing.   HENT:      Head: Normocephalic and atraumatic.   Eyes:      Conjunctiva/sclera: Conjunctivae normal.      Pupils: Pupils are equal, round, and reactive to light.   Neck:      Vascular: No JVD.   Cardiovascular:      Rate and Rhythm: Normal rate and regular rhythm.      Heart sounds: Normal heart sounds. No murmur heard.    No friction rub. No gallop.   Pulmonary:      Effort: Pulmonary effort is normal. No respiratory distress.      Breath sounds: Normal breath  "sounds.   Abdominal:      General: Bowel sounds are normal. There is no distension.      Palpations: Abdomen is soft.   Musculoskeletal:         General: No swelling or deformity.   Skin:     General: Skin is warm and dry.      Capillary Refill: Capillary refill takes less than 2 seconds.   Neurological:      Mental Status: She is alert and oriented to person, place, and time. Mental status is at baseline.   Psychiatric:         Mood and Affect: Mood normal.         Behavior: Behavior normal.          Review of Systems   Constitutional: Positive for fatigue. Negative for unexpected weight change.   HENT: Negative for congestion and nosebleeds.    Eyes: Negative for photophobia and visual disturbance.   Respiratory: Positive for shortness of breath. Negative for cough and chest tightness.    Cardiovascular: Negative for chest pain, palpitations and leg swelling.   Gastrointestinal: Negative for abdominal distention and blood in stool.   Endocrine: Negative for polyphagia and polyuria.   Genitourinary: Positive for frequency. Negative for urgency.   Musculoskeletal: Negative for joint swelling and myalgias.   Skin: Negative for pallor and rash.   Neurological: Positive for weakness. Negative for dizziness, syncope, light-headedness, numbness and headaches.   Hematological: Does not bruise/bleed easily.   Psychiatric/Behavioral: Positive for sleep disturbance. Negative for confusion.        OBJECTIVE:  /60 (BP Location: Left arm, Patient Position: Sitting)   Pulse 64   Ht 144.8 cm (57.01\")   Wt 73.9 kg (163 lb)   SpO2 98%   BMI 35.26 kg/m²      Body mass index is 35.26 kg/m².  Wt Readings from Last 1 Encounters:   07/11/22 73.9 kg (163 lb)       Lab Review:  Renal Function: Estimated Creatinine Clearance: 22.1 mL/min (A) (by C-G formula based on SCr of 1.49 mg/dL (H)).    Lab Results   Component Value Date    PROBNP 4,713.0 (H) 06/06/2022       Results for orders placed during the hospital encounter of " 01/26/22    Adult Transthoracic Echo Complete W/ Cont if Necessary Per Protocol    Interpretation Summary  · Estimated left ventricular EF = 68% Left ventricular systolic function is normal.  · Left ventricular diastolic function is consistent with age.  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 The prosthetic aortic valve peak and mean gradients are elevated.  · Peak velocity of the flow distal to the aortic valve is 294.5 cm/s.  · Mild tricuspid valve regurgitation is present. Estimated right ventricular systolic pressure from tricuspid regurgitation is mildly elevated (35-45 mmHg). Calculated right ventricular systolic pressure from tricuspid regurgitation is 37.7 mmHg.      Procedures      6 MINUTE WALK                      Cardiac Procedures:  1. N/A       Previously trialed diuretics  Lasix      Previously trialed GDMT    Spironolactone  Losartan      ASSESSMENT:     Diagnosis Plan   1. Chronic heart failure with preserved ejection fraction (HCC)  Basic Metabolic Panel    BNP   2. Type 2 diabetes mellitus with stage 4 chronic kidney disease, with long-term current use of insulin (HCC)     3. Stage 3b chronic kidney disease (HCC)     4. Paroxysmal atrial fibrillation (HCC)     5. Pulmonary hypertension (HCC)     6. Essential hypertension           PLAN OF CARE:  1.  HFpEF-NYHA class III.  Most recent ejection fraction 60% per echocardiogram.  Renal function precludes GDMT.    She remains euvolemic on exam today on her current GDMT.  I would like to continue her current regimen, and I will check labs today as below.  She is to continue following a strict low-sodium diet of 2000 mg daily or less, fluid restriction of 1500 mL daily, as well as remain adherent with daily weights.    Directions for when to call the clinic reviewed with the patient to include weight gain of 2 to 3 pounds in 24 hours, weight gain of 5 to 10 pounds within 7 days; worsening shortness of breath; worsening lower extremity edema or  abdominal distention.    2.  Limited mobility-patient is wheelchair-bound.  This limits her activity level considerably.    3.  Hypertension- stable and controlled.    4.  Hyperlipidemia- stable and controlled.     5.  Paroxysmal atrial fibrillation- rate and rhythm regular and controlled on auscultation today.  Currently on Lopressor.    6.  DM2- stable and well controlled on insulin.    7.  CKD 4-followed by nephrology.  BMP today        CBC/BMP/BNP; continue current GDMT; follow-up in 6 weeks or sooner if needed        Advance Care Planning   Will discuss at subsequent visit      Thank you for allowing me to participate in the care of your patient,         Evi Fenton APRLEA  [facility] HEART FAILURE  07/11/22  14:47 EDT      **Leisa Disclaimer:**  Much of this encounter note is an electronic transcription/translation of spoken language to printed text. The electronic translation of spoken language may permit erroneous, or at times, nonsensical words or phrases to be inadvertently transcribed. Although I have reviewed the note for such errors, some may still exist.

## 2022-07-12 ENCOUNTER — TELEPHONE (OUTPATIENT)
Dept: CARDIOLOGY | Facility: HOSPITAL | Age: 87
End: 2022-07-12

## 2022-07-12 NOTE — TELEPHONE ENCOUNTER
----- Message from RIGO Lowe sent at 7/12/2022 11:24 AM EDT -----  Please let the patient know that labs are stable.  No change to current plan of care.    ThanksMirta  ----- Message -----  From: Lab, Background User  Sent: 7/11/2022   3:02 PM EDT  To: [facility] Heart Fail Clinic Clinical Pool

## 2022-07-12 NOTE — TELEPHONE ENCOUNTER
Spoke to Pt's daughter Radha.  Informed of lab results as per Mirta's note.  She did ask about platelets.  I let her know that we only collected a BNP, BMP yesterday, so we do not have any platelet results (would need a CBC).  All questions and concerns addressed. Understanding and agreement verbalized.      Claudia Mason RN  [facility]

## 2022-07-12 NOTE — TELEPHONE ENCOUNTER
----- Message from RIGO Lowe sent at 7/12/2022 11:24 AM EDT -----  Please let the patient know that labs are stable.  No change to current plan of care.    ThanksMirta  ----- Message -----  From: Lab, Background User  Sent: 7/11/2022   3:02 PM EDT  To: [facility] Clinical Pool

## 2022-07-20 NOTE — PROGRESS NOTES
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  She has now been discharged home from the nursing facility and is living with her daughter.  She feels much better being back at home.  She has continued to make progress in terms of her overall strength and mobility.  She remains in a wheelchair today.  She has not experienced any bleeding issues.  She does have some mild chronic constipation which is relieved with MiraLAX.  She has no other complaints currently.      Objective   Vital Signs:   /72   Pulse 61   Temp 98.2 °F (36.8 °C) (Temporal)   Resp 18   Ht 144.8 cm (57.01\")   Wt 72.9 kg (160 lb 12.8 oz)   SpO2 98%   BMI 34.79 kg/m²     Physical Exam  Constitutional:       Appearance: She is well-developed.      Comments: Elderly female no distress seated in wheelchair   Eyes:      Conjunctiva/sclera: Conjunctivae normal.   Neck:      Thyroid: No thyromegaly.   Cardiovascular:      Rate and Rhythm: Normal rate and regular rhythm.      Heart sounds: Murmur heard.     No friction rub. No gallop.   Pulmonary:      Effort: No respiratory distress.      Breath sounds: Normal breath sounds.   Chest:   Breasts:      Right: No supraclavicular adenopathy.      Left: No supraclavicular adenopathy.       Abdominal:      General: Bowel sounds are normal. There is no distension.      Palpations: Abdomen is soft.      Tenderness: There is no abdominal tenderness.   Musculoskeletal:         General: Swelling present.      Comments: 1+ bilateral lower extremity edema   Lymphadenopathy:      Head:      Right side of head: No submandibular adenopathy.      Cervical: No cervical adenopathy.      Upper Body:      Right upper body: No supraclavicular adenopathy.      Left upper body: No supraclavicular " adenopathy.   Skin:     General: Skin is warm and dry.      Findings: No rash.   Neurological:      Mental Status: She is alert and oriented to person, place, and time.      Cranial Nerves: No cranial nerve deficit.      Motor: No abnormal muscle tone.      Deep Tendon Reflexes: Reflexes normal.   Psychiatric:         Behavior: Behavior normal.        Result Review : Reviewed CBC, CMP, IPF from today       Assessment and Plan     *Thrombocytopenia  · Patient with apparent longstanding history of thrombocytopenia  · Previous CT scan with suggestion of chronic liver disease/cirrhotic liver morphology, last CT 4/15/2019 with normal spleen  · Platelet count 3/22/2019 was 52,000 and on 1/26/2022 was 60,000  · On admission 3/2/2022, platelet count 39,000  · Additional labs 3/3/2022 with IPF elevated at 12.2% indicative of peripheral destructive process and has remained elevated in the 10-12% range.  · Additional labs 3/3/2022 with positive BECCA at 1: 320 dilution with homogeneous pattern, negative BECCA panel  · On 3/4/2022, B12 low normal at 238, folate greater than 20, negative serum protein electrophoresis and immunoelectrophoresis with free kappa light chain 65.6, free lambda light chain 37.1 and free light chain ratio 1.77 (appropriate for degree of renal dysfunction), LDH borderline elevated 238  · CT abdomen and pelvis 3/8/2022 with liver morphology suspicious for chronic liver disease, spleen normal in size at 10.4 cm.  No other abnormalities.  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    · Unclear whether  improvement in thrombocytopenia was related to steroids, B12 replacement, or improvement in CHF/volume overload.  · Recommended decreasing prednisone dose down to 15 mg daily prior to transition to subacute nursing facility on 3/9/2022.  Patient however was discharged on prednisone  20 mg daily.  Requested that nursing facility forward labs weekly monitor platelet count and further gradually taper prednisone dose however this did not occur.  · On 3/23/2022, platelet count was 64,000.  Tapered prednisone from 20 down to 15 mg daily.  Intend to maintain platelet count above the 50-60762 range.  Consider additional treatment options with IVIG or rituximab if platelet count declines into the 30,000 range or below consistently.  · 3/31/2022 platelet count 65,000 and on 4/4/2022 prednisone was tapered down to 10 mg daily  · On 4/20/2022, platelet count of 96,000 and prednisone tapered down to 5 mg daily.  · Platelet count on 6/15/2022 92,000 with subsequent taper of prednisone to 5 mg every other day  · Patient returns today in follow-up, currently receiving prednisone at 5 mg every other day last tapered on 6/15/2022.  We are attempting to maintain a platelet count at least above the 50-07692 range while tapering steroids.  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  There has been some consideration of underlying liver disease as a contributing factor however spleen has been normal in size on imaging studies.  Platelet count today is 53,000 with elevated IPF 10.5%.  She is not experiencing any bleeding difficulties.  She is now back home after leaving the subacute nursing facility and has been doing quite well, gradually continuing to improve in terms of her mobility.  She remains in a wheelchair today.  We discussed again today our goal of trying to maintain her platelet count in the current range 50-39535.  I am reluctant to taper her prednisone any further at this time and she remains on very low-dose prednisone at 5 mg every other day.  We will continue prednisone at this dose for now.  She will return in 2 in 4 weeks for NP visit with repeat labs and only if her platelet count increases significantly would we consider tapering further.  I will see her in 6 weeks for  follow-up.    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  · Baseline creatinine 1.5-2  · Creatinine on admission 3/2/2022 was 2.79, subsequently improved  · Patient continues routine follow-up with nephrology  · Creatinine today 1.33     *Acute on chronic diastolic CHF  · History of severe aortic stenosis status post aortic valve replacement in June 2013  · Patient admitted with progressive generalized weakness and dyspnea on exertion  · Patient receiving diuretics currently with Lasix 20 mg daily     *Diabetes mellitus type 2  · Exacerbated by steroids, subsequently improved with steroid taper     *Possible chronic liver disease  · Prior CTs with notation of cirrhotic liver morphology  · CT abdomen pelvis 4/15/2019 showed liver with a mildly shrunken appearance concerning for chronic liver disease.  Spleen however was normal in size.  · LFTs normal on 3/2/2022  · CT abdomen and pelvis 3/8/2022 with liver morphology suspicious for chronic liver disease, spleen normal in size at 10.4 cm.  No other abnormalities.  · Significance of the liver appearance on scans is unclear.  · On 3/23/2022, elevated LFTs with ALT 46, AST 40, normal total bilirubin 0.4.  · LFTs today remain normal    *GI prophylaxis  · Patient is currently  receiving Pepcid 10 mg twice daily    *Pulmonary nodule  · Patient underwent CT scan at [facility] on 9/7/2021 that showed a 4 mm subpleural left lower lobe nodule  · Patient was scheduled for follow-up CT chest with thoracic surgery however declined to proceed with the scan.  Given her age and limited ability to treat a malignancy and with a high probability that this is a benign finding, scan was canceled.       Plan:  1. Continue prednisone 5 mg every other day.  We are attempting to maintain platelet count above the 40-04942 range.  2. Continue GI prophylaxis with Pepcid 10 mg twice daily  3. In 2 weeks and again in 4 weeks nurse practitioner visit with CBC, CMP, IPF  4. MD visit in 6 weeks with CBC, CMP, IPF.

## 2022-07-22 ENCOUNTER — LAB (OUTPATIENT)
Dept: LAB | Facility: HOSPITAL | Age: 87
End: 2022-07-22

## 2022-07-22 ENCOUNTER — OFFICE VISIT (OUTPATIENT)
Dept: ONCOLOGY | Facility: CLINIC | Age: 87
End: 2022-07-22

## 2022-07-22 VITALS
DIASTOLIC BLOOD PRESSURE: 72 MMHG | OXYGEN SATURATION: 98 % | TEMPERATURE: 98.2 F | HEIGHT: 57 IN | WEIGHT: 160.8 LBS | BODY MASS INDEX: 34.69 KG/M2 | RESPIRATION RATE: 18 BRPM | SYSTOLIC BLOOD PRESSURE: 165 MMHG | HEART RATE: 61 BPM

## 2022-07-22 DIAGNOSIS — D69.3 CHRONIC ITP (IDIOPATHIC THROMBOCYTOPENIA): ICD-10-CM

## 2022-07-22 DIAGNOSIS — N28.9 RENAL DYSFUNCTION: ICD-10-CM

## 2022-07-22 DIAGNOSIS — D69.6 THROMBOCYTOPENIA: Primary | ICD-10-CM

## 2022-07-22 DIAGNOSIS — D69.6 THROMBOCYTOPENIA: ICD-10-CM

## 2022-07-22 LAB
ALBUMIN SERPL-MCNC: 4.2 G/DL (ref 3.5–5.2)
ALBUMIN/GLOB SERPL: 1.4 G/DL (ref 1.1–2.4)
ALP SERPL-CCNC: 78 U/L (ref 38–116)
ALT SERPL W P-5'-P-CCNC: 26 U/L (ref 0–33)
ANION GAP SERPL CALCULATED.3IONS-SCNC: 14.9 MMOL/L (ref 5–15)
AST SERPL-CCNC: 25 U/L (ref 0–32)
BASOPHILS # BLD AUTO: 0.02 10*3/MM3 (ref 0–0.2)
BASOPHILS NFR BLD AUTO: 0.3 % (ref 0–1.5)
BILIRUB SERPL-MCNC: 0.3 MG/DL (ref 0.2–1.2)
BUN SERPL-MCNC: 25 MG/DL (ref 6–20)
BUN/CREAT SERPL: 18.8 (ref 7.3–30)
CALCIUM SPEC-SCNC: 9.8 MG/DL (ref 8.5–10.2)
CHLORIDE SERPL-SCNC: 96 MMOL/L (ref 98–107)
CO2 SERPL-SCNC: 23.1 MMOL/L (ref 22–29)
CREAT SERPL-MCNC: 1.33 MG/DL (ref 0.6–1.1)
DEPRECATED RDW RBC AUTO: 46 FL (ref 37–54)
EGFRCR SERPLBLD CKD-EPI 2021: 37.9 ML/MIN/1.73
EOSINOPHIL # BLD AUTO: 0.02 10*3/MM3 (ref 0–0.4)
EOSINOPHIL NFR BLD AUTO: 0.3 % (ref 0.3–6.2)
ERYTHROCYTE [DISTWIDTH] IN BLOOD BY AUTOMATED COUNT: 13.9 % (ref 12.3–15.4)
GLOBULIN UR ELPH-MCNC: 2.9 GM/DL (ref 1.8–3.5)
GLUCOSE SERPL-MCNC: 140 MG/DL (ref 74–124)
HCT VFR BLD AUTO: 42 % (ref 34–46.6)
HGB BLD-MCNC: 13.6 G/DL (ref 12–15.9)
IMM GRANULOCYTES # BLD AUTO: 0.02 10*3/MM3 (ref 0–0.05)
IMM GRANULOCYTES NFR BLD AUTO: 0.3 % (ref 0–0.5)
LYMPHOCYTES # BLD AUTO: 1.39 10*3/MM3 (ref 0.7–3.1)
LYMPHOCYTES NFR BLD AUTO: 18.7 % (ref 19.6–45.3)
MCH RBC QN AUTO: 28.9 PG (ref 26.6–33)
MCHC RBC AUTO-ENTMCNC: 32.4 G/DL (ref 31.5–35.7)
MCV RBC AUTO: 89.4 FL (ref 79–97)
MONOCYTES # BLD AUTO: 0.33 10*3/MM3 (ref 0.1–0.9)
MONOCYTES NFR BLD AUTO: 4.4 % (ref 5–12)
NEUTROPHILS NFR BLD AUTO: 5.65 10*3/MM3 (ref 1.7–7)
NEUTROPHILS NFR BLD AUTO: 76 % (ref 42.7–76)
NRBC BLD AUTO-RTO: 0 /100 WBC (ref 0–0.2)
PLATELET # BLD AUTO: 53 10*3/MM3 (ref 140–450)
PLATELETS.RETICULATED NFR BLD AUTO: 10.5 % (ref 0.9–6.5)
PMV BLD AUTO: 11.5 FL (ref 6–12)
POTASSIUM SERPL-SCNC: 4.7 MMOL/L (ref 3.5–4.7)
PROT SERPL-MCNC: 7.1 G/DL (ref 6.3–8)
RBC # BLD AUTO: 4.7 10*6/MM3 (ref 3.77–5.28)
SODIUM SERPL-SCNC: 134 MMOL/L (ref 134–145)
WBC NRBC COR # BLD: 7.43 10*3/MM3 (ref 3.4–10.8)

## 2022-07-22 PROCEDURE — 85055 RETICULATED PLATELET ASSAY: CPT

## 2022-07-22 PROCEDURE — 85025 COMPLETE CBC W/AUTO DIFF WBC: CPT

## 2022-07-22 PROCEDURE — 99214 OFFICE O/P EST MOD 30 MIN: CPT | Performed by: INTERNAL MEDICINE

## 2022-07-22 PROCEDURE — 80053 COMPREHEN METABOLIC PANEL: CPT

## 2022-07-22 PROCEDURE — 36415 COLL VENOUS BLD VENIPUNCTURE: CPT

## 2022-07-26 ENCOUNTER — OFFICE VISIT (OUTPATIENT)
Dept: CARDIOLOGY | Facility: CLINIC | Age: 87
End: 2022-07-26

## 2022-07-26 VITALS
HEIGHT: 57 IN | DIASTOLIC BLOOD PRESSURE: 60 MMHG | HEART RATE: 64 BPM | SYSTOLIC BLOOD PRESSURE: 150 MMHG | WEIGHT: 159 LBS | BODY MASS INDEX: 34.3 KG/M2

## 2022-07-26 DIAGNOSIS — I36.1 NONRHEUMATIC TRICUSPID VALVE REGURGITATION: ICD-10-CM

## 2022-07-26 DIAGNOSIS — E78.1 HYPERTRIGLYCERIDEMIA: ICD-10-CM

## 2022-07-26 DIAGNOSIS — I50.32 CHRONIC HEART FAILURE WITH PRESERVED EJECTION FRACTION: Primary | ICD-10-CM

## 2022-07-26 DIAGNOSIS — I48.0 PAROXYSMAL ATRIAL FIBRILLATION: ICD-10-CM

## 2022-07-26 DIAGNOSIS — I10 ESSENTIAL HYPERTENSION: ICD-10-CM

## 2022-07-26 DIAGNOSIS — I35.0 NONRHEUMATIC AORTIC VALVE STENOSIS: ICD-10-CM

## 2022-07-26 PROCEDURE — 93000 ELECTROCARDIOGRAM COMPLETE: CPT | Performed by: INTERNAL MEDICINE

## 2022-07-26 PROCEDURE — 99214 OFFICE O/P EST MOD 30 MIN: CPT | Performed by: INTERNAL MEDICINE

## 2022-07-26 RX ORDER — GABAPENTIN 400 MG/1
600 CAPSULE ORAL 2 TIMES DAILY
COMMUNITY
End: 2022-12-28

## 2022-07-26 NOTE — PROGRESS NOTES
Date of Office Visit: 2022  Encounter Provider: Beth Butcher MD  Place of Service: [facility] CARDIOLOGY  Patient Name: Helena Carmen  :[id number]      Patient ID:  Helena Carmen is a 89 y.o. female is here for  followup for CHF.         History of Present Illness    She was originally seen for a pericardial effusion noted on a CT scan of the abdomen and  pelvis. On her initial exam there was a murmur, and she complained of dyspnea. She had a  PET stress study performed in 2010 which showed no ischemia. She had an echocardiogram  the same day which showed an ejection fraction of 67%, mild concentric left ventricular  hypertrophy, grade IA diastolic dysfunction, moderate to marked left atrial enlargement,  moderate aortic stenosis with a peak gradient of 42 mmHg and a mean gradient of 24 mmHg.   There was mild mitral and tricuspid insufficiency, with a small pericardial effusion. Her  carotid duplex study also performed on that day was normal as well.      She had increasing fatigue and dyspnea and was able to do very little, which she thought was due to her back pain.   She had a 2-D echocardiogram with Doppler in 2013. Her ejection   fraction was 58%. There was severe aortic stenosis with valvular area of 0.6 mm cubed, a   peak gradient of 71 with a mean of 41 mmHg. There was also moderate mitral insufficiency, severe   left atrial enlargement, right ventricular systolic pressure of 45 mmHg and grade II diastolic   dysfunction. She had a normal carotid duplex in 2013.     She went to [facility]ve Clinic after having a cardiac catheterization done. Her cardiac  catheterization showed minimal coronary artery disease. Dr. Murry saw her in the valve  clinic and agreed that she needed aortic valvular replacement. Dr. Lagunas saw her and  was in agreement as well. On 2013, she had a 21 mm [employer] Mitch Trifecta bovine  pericardial valve placed. Postoperatively, she had renal  insufficiency and atrial  fibrillation which have gotten better. She had some encephalopathy postoperatively but  went to [facility] and did rehabilitation and did quite well.     She had PVCs. We did check a Holter monitor and there was no evidence of atrial fibrillation there so we  were able to stop her warfarin.      She was in the hospital with C. Difficile colitis from 07/30/2013 to 08/09/2013. She also had klebsiella urinary tract infection  and was treated with Levaquin. She was in acute renal failure. She had some pancreatitis  at that time as well. During her hospitalization we did see  her and her cardiac status was stable. She had a 2-D echocardiogram with Doppler  performed on 08/01/2013 which revealed an ejection fraction of 51%, mild-to-moderate left  ventricular hypertrophy, grade II diastolic dysfunction, moderate-to-severe left atrial  enlargement, mild-to-moderate mitral insufficiency, mild tricuspid insufficiency, and a  tissue-type aortic valve which was functioning quite well without stenosis or  insufficiency.     She is sedentary due to her blindness.       In January 2022, echocardiogram showed LVEF 68%, diastolic function consistent with age, bioprosthetic aortic valve present, peak and mean gradients elevated, and mild tricuspid regurgitation.     She received 1 dose of IV Lasix in clinic on 2/23/2022 and oral Lasix was restarted at 40 mg twice daily.  Nephrology, of note, stopped losartan, increased hydralazine, and decreased Lasix on 2/28/2022.     Admission from 3/2/2022 to 3/9/2022 was noted with acute on chronic exacerbation of her diastolic heart failure and acute kidney injury.  She has since been treated with Lasix 40 mg every other day and daily spironolactone per nephrology recommendation.     She is currently at the [facility].       She follows with Dr. Sherman for her thrombocytopenia.  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  Her platelets seem to maintain as long she is  on prednisone.  She follows with Dr. Art Wick from nephrology.    She was hospitalized 6/2022 with urinary tract infection.  She saw Shikha Blum and Mirta Fenton since her last visit with me.  Labs on 7/22/2022 show glucose 140, creatinine 1.33, otherwise normal CMP, platelets 53, otherwise normal CBC.  She is seeing Dr. Sherman every 2 weeks to keep platelets well treated.    She is back at home and no longer at [facility].  She is back on a low-dose of Lasix daily.  Her breathing has been good.  She has no orthopnea or PND.  She has no chest pain or pressure.  She walks in her home and with physical therapy.  She has no dizziness or syncope and does not feel her heart racing or skipping.  She is sleeping well and her appetite is good.  She is able to take her medications as directed without difficulty.    Past Medical History:   Diagnosis Date   • Aortic valve stenosis     s/p tissue AVR   • Back pain    • CKD (chronic kidney disease)    • Colitis due to Clostridioides difficile 01/26/2022   • Diastolic dysfunction     Grade 2 per echocardiogram 2013   • Diverticulosis    • Exertional shortness of breath    • Heart disease    • Hiatal hernia    • Hyperlipidemia    • Hypertension    • Hyperthyroidism    • Hypertriglyceridemia 05/31/2018   • Hypothyroidism    • Left ventricular hypertrophy    • Legally blind    • Liver disease    • Macular degeneration    • Mitral regurgitation    • Osteoarthritis of hip    • Pancreatitis 01/26/2022   • Paroxysmal atrial fibrillation (HCC)    • Premature ventricular contractions    • Pulmonary hypertension (HCC)    • Renal insufficiency syndrome    • Type 2 diabetes mellitus (HCC)    • Uterine cancer (HCC)          Past Surgical History:   Procedure Laterality Date   • AORTIC VALVE REPAIR/REPLACEMENT     • CATARACT EXTRACTION      1970, 1999   • ENDOSCOPY  08/15/2014    no gross lesions in stomach/duodenum, erythrematous mucosa in stomach   • HYSTERECTOMY  2007   • STERNOTOMY          Current Outpatient Medications on File Prior to Visit   Medication Sig Dispense Refill   • acetaminophen (TYLENOL) 325 MG tablet Take 650 mg by mouth Every 6 (Six) Hours As Needed.     • amLODIPine (NORVASC) 10 MG tablet Take 1 tablet by mouth daily.     • famotidine (PEPCID) 10 MG tablet Take 10 mg by mouth 2 (Two) Times a Day.     • furosemide (LASIX) 20 MG tablet Take 1 tablet by mouth Daily. 30 tablet 11   • gabapentin (NEURONTIN) 400 MG capsule Take 400 mg by mouth 2 (Two) Times a Day.     • insulin lispro (humaLOG) 100 UNIT/ML injection Inject 0-10 Units under the skin into the appropriate area as directed 3 (Three) Times a Day Before Meals. 2 units for every 50 > 150     • levothyroxine (SYNTHROID, LEVOTHROID) 25 MCG tablet Take 1 tablet by mouth daily.     • LORazepam (ATIVAN) 0.5 MG tablet      • metoprolol tartrate (LOPRESSOR) 25 MG tablet Take 12.5 mg by mouth 2 (Two) Times a Day.     • montelukast (SINGULAIR) 10 MG tablet Take 1 tablet by mouth Every Night.     • polyethylene glycol (MIRALAX) 17 GM/SCOOP powder Take 17 g by mouth Daily.     • predniSONE (DELTASONE) 5 MG tablet Take 10 mg by mouth Every Other Day.     • rosuvastatin (CRESTOR) 5 MG tablet Take 1 tablet by mouth Daily.     • tamsulosin (FLOMAX) 0.4 MG capsule 24 hr capsule Take 0.4 mg by mouth every night at bedtime.     • [DISCONTINUED] gabapentin (NEURONTIN) 300 MG capsule Take 1 capsule by mouth 2 (Two) Times a Day for 3 days. 6 capsule 0   • [DISCONTINUED] insulin glargine (LANTUS, SEMGLEE) 100 UNIT/ML injection Inject 30 Units under the skin into the appropriate area as directed Every Night for 30 days. 100 mL 0     No current facility-administered medications on file prior to visit.       Social History     Socioeconomic History   • Marital status:    Tobacco Use   • Smoking status: Former Smoker     Packs/day: 0.50     Quit date: 7/10/1969     Years since quittin.0   • Smokeless tobacco: Never Used   Substance and  "Sexual Activity   • Alcohol use: No     Comment: caffeine use - coffee 2 cups daily   • Drug use: No           ROS    Procedures    ECG 12 Lead    Date/Time: 7/26/2022 1:07 PM  Performed by: Beth Butcher MD  Authorized by: Beth Butcher MD   Comparison: compared with previous ECG   Similar to previous ECG  Rhythm: sinus rhythm  Conduction: incomplete left bundle branch block  ST Depression: I  T inversion: aVL  Other findings: poor R wave progression    Clinical impression: abnormal EKG                Objective:      Vitals:    07/26/22 1253   BP: 150/60   BP Location: Right arm   Pulse: 64   Weight: 72.1 kg (159 lb)   Height: 144.8 cm (57.01\")     Body mass index is 34.4 kg/m².    Vitals reviewed.   Constitutional:       General: Not in acute distress.     Appearance: Well-developed. Not diaphoretic.   Eyes:      General: No scleral icterus.     Conjunctiva/sclera: Conjunctivae normal.   HENT:      Head: Normocephalic and atraumatic.   Neck:      Thyroid: No thyromegaly.      Vascular: No carotid bruit or JVD.      Lymphadenopathy: No cervical adenopathy.   Pulmonary:      Effort: Pulmonary effort is normal. No respiratory distress.      Breath sounds: Normal breath sounds. No wheezing. No rhonchi. No rales.   Chest:      Chest wall: Not tender to palpatation.   Cardiovascular:      Normal rate. Regular rhythm.      Murmurs: There is a grade 2/6 midsystolic murmur at the URSB and ULSB.      No gallop.   Pulses:     Intact distal pulses.   Edema:     Peripheral edema absent.   Abdominal:      General: Bowel sounds are normal. There is no distension or abdominal bruit.      Palpations: Abdomen is soft. There is no abdominal mass.      Tenderness: There is no abdominal tenderness.   Musculoskeletal:         General: No deformity.      Extremities: No clubbing present.     Cervical back: Neck supple. Skin:     General: Skin is warm and dry. There is no cyanosis.      Coloration: Skin is not pale.      " Findings: No rash.   Neurological:      Mental Status: Alert and oriented to person, place, and time.      Cranial Nerves: No cranial nerve deficit.   Psychiatric:         Judgment: Judgment normal.         Lab Review:       Assessment:      Diagnosis Plan   1. Chronic heart failure with preserved ejection fraction (HCC)     2. Nonrheumatic aortic valve stenosis     3. Essential hypertension     4. Hypertriglyceridemia     5. Nonrheumatic tricuspid valve regurgitation     6. Paroxysmal atrial fibrillation (HCC)       1. Severe aortic stenosis, status post AVR [employer] Mitch Trifecta bovine   pericardial valve, 21 mm on 06/08/2013. Doing well.   2.  HFpEF, chronic with history of acute exacerbations.  Right now her HFpEF is stable, she is not decompensated.  3. Small hiatal hernia. Followed by Dr. Freddy Martel.  4. Diverticular disease. Followed by Dr. Freddy Martel.  5. Diabetes mellitus, type 2. Followed by Dr. Hickman.   6. Grade II diastolic dysfunction.  7. Hypothyroidism.  8. 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  9. Hyperlipidemia. The patient is on Zocor.  10. Family history of cardiovascular disease.  12. Ventricular bigeminy. Stable.   13. HTN, BP controlled.   14.  CKD, stable but possibly made worse at times by uncontrolled hypertension.  Follows with Dr. Art Wick.  15.  Thrombocytopenia.  Seeing Dr. Sherman.     Plan:       See back at the end of September.  She sees Mirta 8/22/2022.  No medication changes and other testing at this time.

## 2022-07-29 ENCOUNTER — TELEPHONE (OUTPATIENT)
Dept: CARDIOLOGY | Facility: CLINIC | Age: 87
End: 2022-07-29

## 2022-07-29 RX ORDER — BEBTELOVIMAB 87.5 MG/ML
175 INJECTION, SOLUTION INTRAVENOUS ONCE
Qty: 2 ML | Refills: 0 | Status: SHIPPED | OUTPATIENT
Start: 2022-07-29 | End: 2022-08-02 | Stop reason: HOSPADM

## 2022-07-29 NOTE — TELEPHONE ENCOUNTER
Called and spoke with our ambulatory care clinic at [facility] and was told to call Jennifer with [facility] to arrange for antibody infusion.      Called and spoke with Jennifer who emailed me the form that the ordering provider must fill out for approval.  After the form is filled out and faxed to our hospital pharmacy, the pharmacist approves or denies it.  If approved, schedule 1 will call the patient and set up a day and time.      Called and spoke with Radha, patient's daughter, who reports patient tested positive today and had a cough that started yesterday and some sweatiness/claminess that started this morning.  Rdaha also states that since speaking with us they were able to get an antibody infusion set up for Monday through their PCP, and the MD sent over some mucinex and another medication to their pharmacy as well.       She wanted me to express to you Dr. Butcher how much she really appreciates your effort and assistance.    [facility] Triage  07/29/22 13:26 EDT     Called and spoke with Jennifer ([facility]) who spoke with ambulatory care, and it is too late in the day to get an infusion set up for today (Friday) and they are not open on the weekends.    [facility] Triage  07/29/22 13:45 EDT

## 2022-07-29 NOTE — TELEPHONE ENCOUNTER
Pt tested Covid this morning. She tried to call her PCP today but he is not working today.     She is wanting to know if you can prescribe anything for Covid?    PT#: [phone]

## 2022-07-29 NOTE — TELEPHONE ENCOUNTER
Pls call her - I ordered IV monoclonal antibodies which she will have to get at an infusion - pls help her facilitate this.     rm

## 2022-07-30 ENCOUNTER — HOSPITAL ENCOUNTER (INPATIENT)
Facility: HOSPITAL | Age: 87
LOS: 3 days | Discharge: HOME HEALTH | End: 2022-08-02
Attending: EMERGENCY MEDICINE | Admitting: HOSPITALIST

## 2022-07-30 ENCOUNTER — APPOINTMENT (OUTPATIENT)
Dept: GENERAL RADIOLOGY | Facility: HOSPITAL | Age: 87
End: 2022-07-30

## 2022-07-30 DIAGNOSIS — R79.89 ELEVATED LFTS: ICD-10-CM

## 2022-07-30 DIAGNOSIS — R53.1 WEAKNESS: ICD-10-CM

## 2022-07-30 DIAGNOSIS — D69.6 THROMBOCYTOPENIA: ICD-10-CM

## 2022-07-30 DIAGNOSIS — U07.1 COVID-19: Primary | ICD-10-CM

## 2022-07-30 DIAGNOSIS — E87.1 HYPONATREMIA: ICD-10-CM

## 2022-07-30 PROBLEM — D69.59 THROMBOCYTOPENIA DUE TO COVID-19 VIRUS: Status: ACTIVE | Noted: 2022-07-30

## 2022-07-30 PROBLEM — D63.8 ANEMIA, CHRONIC DISEASE: Status: ACTIVE | Noted: 2022-07-30

## 2022-07-30 LAB
ALBUMIN SERPL-MCNC: 3.8 G/DL (ref 3.5–5.2)
ALBUMIN/GLOB SERPL: 1.4 G/DL
ALP SERPL-CCNC: 73 U/L (ref 39–117)
ALT SERPL W P-5'-P-CCNC: 40 U/L (ref 1–33)
ANION GAP SERPL CALCULATED.3IONS-SCNC: 14 MMOL/L (ref 5–15)
AST SERPL-CCNC: 46 U/L (ref 1–32)
B PARAPERT DNA SPEC QL NAA+PROBE: NOT DETECTED
B PERT DNA SPEC QL NAA+PROBE: NOT DETECTED
BASOPHILS # BLD AUTO: 0.01 10*3/MM3 (ref 0–0.2)
BASOPHILS NFR BLD AUTO: 0.3 % (ref 0–1.5)
BILIRUB SERPL-MCNC: 0.4 MG/DL (ref 0–1.2)
BUN SERPL-MCNC: 19 MG/DL (ref 8–23)
BUN/CREAT SERPL: 13.1 (ref 7–25)
C PNEUM DNA NPH QL NAA+NON-PROBE: NOT DETECTED
CALCIUM SPEC-SCNC: 9.2 MG/DL (ref 8.2–9.6)
CHLORIDE SERPL-SCNC: 96 MMOL/L (ref 98–107)
CO2 SERPL-SCNC: 18 MMOL/L (ref 22–29)
CREAT SERPL-MCNC: 1.45 MG/DL (ref 0.57–1)
DEPRECATED RDW RBC AUTO: 44.1 FL (ref 37–54)
EGFRCR SERPLBLD CKD-EPI 2021: 34.1 ML/MIN/1.73
EOSINOPHIL # BLD AUTO: 0 10*3/MM3 (ref 0–0.4)
EOSINOPHIL NFR BLD AUTO: 0 % (ref 0.3–6.2)
ERYTHROCYTE [DISTWIDTH] IN BLOOD BY AUTOMATED COUNT: 14.1 % (ref 12.3–15.4)
FLUAV SUBTYP SPEC NAA+PROBE: NOT DETECTED
FLUBV RNA ISLT QL NAA+PROBE: NOT DETECTED
GLOBULIN UR ELPH-MCNC: 2.7 GM/DL
GLUCOSE BLDC GLUCOMTR-MCNC: 125 MG/DL (ref 70–130)
GLUCOSE BLDC GLUCOMTR-MCNC: 288 MG/DL (ref 70–130)
GLUCOSE SERPL-MCNC: 155 MG/DL (ref 65–99)
HADV DNA SPEC NAA+PROBE: NOT DETECTED
HCOV 229E RNA SPEC QL NAA+PROBE: NOT DETECTED
HCOV HKU1 RNA SPEC QL NAA+PROBE: NOT DETECTED
HCOV NL63 RNA SPEC QL NAA+PROBE: NOT DETECTED
HCOV OC43 RNA SPEC QL NAA+PROBE: NOT DETECTED
HCT VFR BLD AUTO: 37.8 % (ref 34–46.6)
HGB BLD-MCNC: 12.6 G/DL (ref 12–15.9)
HMPV RNA NPH QL NAA+NON-PROBE: NOT DETECTED
HPIV1 RNA ISLT QL NAA+PROBE: NOT DETECTED
HPIV2 RNA SPEC QL NAA+PROBE: NOT DETECTED
HPIV3 RNA NPH QL NAA+PROBE: NOT DETECTED
HPIV4 P GENE NPH QL NAA+PROBE: NOT DETECTED
INR PPP: 1.22 (ref 0.9–1.1)
LYMPHOCYTES # BLD AUTO: 0.69 10*3/MM3 (ref 0.7–3.1)
LYMPHOCYTES NFR BLD AUTO: 17.8 % (ref 19.6–45.3)
M PNEUMO IGG SER IA-ACNC: NOT DETECTED
MAGNESIUM SERPL-MCNC: 1.8 MG/DL (ref 1.7–2.3)
MCH RBC QN AUTO: 28.5 PG (ref 26.6–33)
MCHC RBC AUTO-ENTMCNC: 33.3 G/DL (ref 31.5–35.7)
MCV RBC AUTO: 85.5 FL (ref 79–97)
MONOCYTES # BLD AUTO: 0.56 10*3/MM3 (ref 0.1–0.9)
MONOCYTES NFR BLD AUTO: 14.4 % (ref 5–12)
NEUTROPHILS NFR BLD AUTO: 2.6 10*3/MM3 (ref 1.7–7)
NEUTROPHILS NFR BLD AUTO: 67 % (ref 42.7–76)
NT-PROBNP SERPL-MCNC: 1529 PG/ML (ref 0–1800)
PLATELET # BLD AUTO: 46 10*3/MM3 (ref 140–450)
PMV BLD AUTO: 12.3 FL (ref 6–12)
POTASSIUM SERPL-SCNC: 4.3 MMOL/L (ref 3.5–5.2)
PROT SERPL-MCNC: 6.5 G/DL (ref 6–8.5)
PROTHROMBIN TIME: 15.3 SECONDS (ref 11.7–14.2)
RBC # BLD AUTO: 4.42 10*6/MM3 (ref 3.77–5.28)
RHINOVIRUS RNA SPEC NAA+PROBE: NOT DETECTED
RSV RNA NPH QL NAA+NON-PROBE: NOT DETECTED
SARS-COV-2 RNA NPH QL NAA+NON-PROBE: DETECTED
SODIUM SERPL-SCNC: 128 MMOL/L (ref 136–145)
TROPONIN T SERPL-MCNC: 0.04 NG/ML (ref 0–0.03)
WBC NRBC COR # BLD: 3.88 10*3/MM3 (ref 3.4–10.8)

## 2022-07-30 PROCEDURE — 0202U NFCT DS 22 TRGT SARS-COV-2: CPT | Performed by: EMERGENCY MEDICINE

## 2022-07-30 PROCEDURE — 25010000002 DEXAMETHASONE PER 1 MG: Performed by: HOSPITALIST

## 2022-07-30 PROCEDURE — 85610 PROTHROMBIN TIME: CPT | Performed by: EMERGENCY MEDICINE

## 2022-07-30 PROCEDURE — 84484 ASSAY OF TROPONIN QUANT: CPT | Performed by: EMERGENCY MEDICINE

## 2022-07-30 PROCEDURE — 3E0333Z INTRODUCTION OF ANTI-INFLAMMATORY INTO PERIPHERAL VEIN, PERCUTANEOUS APPROACH: ICD-10-PCS | Performed by: HOSPITALIST

## 2022-07-30 PROCEDURE — 85025 COMPLETE CBC W/AUTO DIFF WBC: CPT | Performed by: EMERGENCY MEDICINE

## 2022-07-30 PROCEDURE — 80053 COMPREHEN METABOLIC PANEL: CPT | Performed by: EMERGENCY MEDICINE

## 2022-07-30 PROCEDURE — 82962 GLUCOSE BLOOD TEST: CPT

## 2022-07-30 PROCEDURE — 83735 ASSAY OF MAGNESIUM: CPT | Performed by: EMERGENCY MEDICINE

## 2022-07-30 PROCEDURE — 83880 ASSAY OF NATRIURETIC PEPTIDE: CPT | Performed by: EMERGENCY MEDICINE

## 2022-07-30 PROCEDURE — 93010 ELECTROCARDIOGRAM REPORT: CPT | Performed by: INTERNAL MEDICINE

## 2022-07-30 PROCEDURE — 93005 ELECTROCARDIOGRAM TRACING: CPT | Performed by: EMERGENCY MEDICINE

## 2022-07-30 PROCEDURE — 99285 EMERGENCY DEPT VISIT HI MDM: CPT

## 2022-07-30 PROCEDURE — 71045 X-RAY EXAM CHEST 1 VIEW: CPT

## 2022-07-30 RX ORDER — POLYETHYLENE GLYCOL 3350 17 G/17G
17 POWDER, FOR SOLUTION ORAL DAILY
Status: DISCONTINUED | OUTPATIENT
Start: 2022-07-30 | End: 2022-07-30 | Stop reason: SDUPTHER

## 2022-07-30 RX ORDER — NITROGLYCERIN 0.4 MG/1
0.4 TABLET SUBLINGUAL
Status: DISCONTINUED | OUTPATIENT
Start: 2022-07-30 | End: 2022-08-02 | Stop reason: HOSPADM

## 2022-07-30 RX ORDER — LEVOTHYROXINE SODIUM 0.03 MG/1
25 TABLET ORAL DAILY
Status: DISCONTINUED | OUTPATIENT
Start: 2022-07-30 | End: 2022-08-02 | Stop reason: HOSPADM

## 2022-07-30 RX ORDER — INSULIN LISPRO 100 [IU]/ML
0-14 INJECTION, SOLUTION INTRAVENOUS; SUBCUTANEOUS
Status: DISCONTINUED | OUTPATIENT
Start: 2022-07-30 | End: 2022-08-01

## 2022-07-30 RX ORDER — POLYETHYLENE GLYCOL 3350 17 G/17G
17 POWDER, FOR SOLUTION ORAL 2 TIMES DAILY
Status: DISCONTINUED | OUTPATIENT
Start: 2022-07-31 | End: 2022-07-31

## 2022-07-30 RX ORDER — SODIUM CHLORIDE 0.9 % (FLUSH) 0.9 %
10 SYRINGE (ML) INJECTION AS NEEDED
Status: DISCONTINUED | OUTPATIENT
Start: 2022-07-30 | End: 2022-08-02 | Stop reason: HOSPADM

## 2022-07-30 RX ORDER — SODIUM CHLORIDE 0.9 % (FLUSH) 0.9 %
10 SYRINGE (ML) INJECTION EVERY 12 HOURS SCHEDULED
Status: DISCONTINUED | OUTPATIENT
Start: 2022-07-30 | End: 2022-08-02 | Stop reason: HOSPADM

## 2022-07-30 RX ORDER — FAMOTIDINE 20 MG/1
10 TABLET, FILM COATED ORAL 2 TIMES DAILY
Status: DISCONTINUED | OUTPATIENT
Start: 2022-07-30 | End: 2022-08-02 | Stop reason: HOSPADM

## 2022-07-30 RX ORDER — DEXAMETHASONE SODIUM PHOSPHATE 10 MG/ML
8 INJECTION INTRAMUSCULAR; INTRAVENOUS ONCE
Status: COMPLETED | OUTPATIENT
Start: 2022-07-30 | End: 2022-07-30

## 2022-07-30 RX ORDER — SODIUM CHLORIDE 9 MG/ML
100 INJECTION, SOLUTION INTRAVENOUS CONTINUOUS
Status: DISCONTINUED | OUTPATIENT
Start: 2022-07-30 | End: 2022-07-31

## 2022-07-30 RX ORDER — CHOLECALCIFEROL (VITAMIN D3) 125 MCG
5 CAPSULE ORAL NIGHTLY PRN
Status: DISCONTINUED | OUTPATIENT
Start: 2022-07-30 | End: 2022-08-02 | Stop reason: HOSPADM

## 2022-07-30 RX ORDER — MONTELUKAST SODIUM 10 MG/1
10 TABLET ORAL NIGHTLY
Status: DISCONTINUED | OUTPATIENT
Start: 2022-07-30 | End: 2022-08-02 | Stop reason: HOSPADM

## 2022-07-30 RX ORDER — DEXTROSE MONOHYDRATE 25 G/50ML
25 INJECTION, SOLUTION INTRAVENOUS
Status: DISCONTINUED | OUTPATIENT
Start: 2022-07-30 | End: 2022-08-02 | Stop reason: HOSPADM

## 2022-07-30 RX ORDER — AMLODIPINE BESYLATE 5 MG/1
10 TABLET ORAL DAILY
Status: DISCONTINUED | OUTPATIENT
Start: 2022-07-30 | End: 2022-08-02 | Stop reason: HOSPADM

## 2022-07-30 RX ORDER — ACETAMINOPHEN 325 MG/1
650 TABLET ORAL EVERY 6 HOURS PRN
Status: DISCONTINUED | OUTPATIENT
Start: 2022-07-30 | End: 2022-08-02 | Stop reason: HOSPADM

## 2022-07-30 RX ORDER — ONDANSETRON 4 MG/1
4 TABLET, FILM COATED ORAL EVERY 6 HOURS PRN
Status: DISCONTINUED | OUTPATIENT
Start: 2022-07-30 | End: 2022-08-02 | Stop reason: HOSPADM

## 2022-07-30 RX ORDER — POLYETHYLENE GLYCOL 3350 17 G/17G
17 POWDER, FOR SOLUTION ORAL DAILY
Status: DISCONTINUED | OUTPATIENT
Start: 2022-07-30 | End: 2022-07-30

## 2022-07-30 RX ORDER — TAMSULOSIN HYDROCHLORIDE 0.4 MG/1
0.4 CAPSULE ORAL DAILY
Status: DISCONTINUED | OUTPATIENT
Start: 2022-07-30 | End: 2022-08-02 | Stop reason: HOSPADM

## 2022-07-30 RX ORDER — NICOTINE POLACRILEX 4 MG
15 LOZENGE BUCCAL
Status: DISCONTINUED | OUTPATIENT
Start: 2022-07-30 | End: 2022-08-02 | Stop reason: HOSPADM

## 2022-07-30 RX ORDER — DEXAMETHASONE SODIUM PHOSPHATE 10 MG/ML
6 INJECTION INTRAMUSCULAR; INTRAVENOUS 2 TIMES DAILY
Status: DISCONTINUED | OUTPATIENT
Start: 2022-07-30 | End: 2022-08-01

## 2022-07-30 RX ORDER — ONDANSETRON 2 MG/ML
4 INJECTION INTRAMUSCULAR; INTRAVENOUS EVERY 6 HOURS PRN
Status: DISCONTINUED | OUTPATIENT
Start: 2022-07-30 | End: 2022-08-02 | Stop reason: HOSPADM

## 2022-07-30 RX ORDER — GABAPENTIN 400 MG/1
400 CAPSULE ORAL 2 TIMES DAILY
Status: DISCONTINUED | OUTPATIENT
Start: 2022-07-30 | End: 2022-08-02 | Stop reason: HOSPADM

## 2022-07-30 RX ADMIN — METOPROLOL TARTRATE 12.5 MG: 25 TABLET, FILM COATED ORAL at 21:33

## 2022-07-30 RX ADMIN — GABAPENTIN 400 MG: 400 CAPSULE ORAL at 21:32

## 2022-07-30 RX ADMIN — ACETAMINOPHEN 650 MG: 325 TABLET, FILM COATED ORAL at 18:48

## 2022-07-30 RX ADMIN — FAMOTIDINE 10 MG: 20 TABLET ORAL at 21:32

## 2022-07-30 RX ADMIN — AMLODIPINE BESYLATE 10 MG: 5 TABLET ORAL at 18:44

## 2022-07-30 RX ADMIN — DEXAMETHASONE SODIUM PHOSPHATE 8 MG: 10 INJECTION INTRAMUSCULAR; INTRAVENOUS at 15:53

## 2022-07-30 RX ADMIN — SODIUM CHLORIDE 100 ML/HR: 9 INJECTION, SOLUTION INTRAVENOUS at 15:52

## 2022-07-30 RX ADMIN — LEVOTHYROXINE SODIUM 25 MCG: 0.03 TABLET ORAL at 18:44

## 2022-07-30 RX ADMIN — TAMSULOSIN HYDROCHLORIDE 0.4 MG: 0.4 CAPSULE ORAL at 21:32

## 2022-07-30 RX ADMIN — MONTELUKAST SODIUM 10 MG: 10 TABLET, FILM COATED ORAL at 21:32

## 2022-07-30 RX ADMIN — DEXAMETHASONE SODIUM PHOSPHATE 6 MG: 10 INJECTION INTRAMUSCULAR; INTRAVENOUS at 21:32

## 2022-07-31 ENCOUNTER — APPOINTMENT (OUTPATIENT)
Dept: CARDIOLOGY | Facility: HOSPITAL | Age: 87
End: 2022-07-31

## 2022-07-31 PROBLEM — E11.65 TYPE 2 DIABETES MELLITUS WITH HYPERGLYCEMIA: Status: ACTIVE | Noted: 2022-01-26

## 2022-07-31 LAB
ALBUMIN SERPL-MCNC: 3.8 G/DL (ref 3.5–5.2)
ALBUMIN/GLOB SERPL: 1.6 G/DL
ALP SERPL-CCNC: 73 U/L (ref 39–117)
ALT SERPL W P-5'-P-CCNC: 41 U/L (ref 1–33)
ANION GAP SERPL CALCULATED.3IONS-SCNC: 13.3 MMOL/L (ref 5–15)
AORTIC DIMENSIONLESS INDEX: 0.5 (DI)
AST SERPL-CCNC: 37 U/L (ref 1–32)
BH CV ECHO AV AORTIC VALVE AT ACCEL TIME CALCULATED: 99 MSEC
BH CV ECHO MEAS - AO MAX PG: 17 MMHG
BH CV ECHO MEAS - AO MEAN PG: 8.6 MMHG
BH CV ECHO MEAS - AO V2 MAX: 206.3 CM/SEC
BH CV ECHO MEAS - AO V2 VTI: 48.3 CM
BH CV ECHO MEAS - AT: 0.1 SEC
BH CV ECHO MEAS - AVA(I,D): 1.78 CM2
BH CV ECHO MEAS - EDV(MOD-SP2): 106 ML
BH CV ECHO MEAS - EDV(MOD-SP4): 84 ML
BH CV ECHO MEAS - EF(MOD-BP): 56.4 %
BH CV ECHO MEAS - EF(MOD-SP2): 57.5 %
BH CV ECHO MEAS - EF(MOD-SP4): 54.8 %
BH CV ECHO MEAS - ESV(MOD-SP2): 45 ML
BH CV ECHO MEAS - ESV(MOD-SP4): 38 ML
BH CV ECHO MEAS - LAT PEAK E' VEL: 8.1 CM/SEC
BH CV ECHO MEAS - LV DIASTOLIC VOL/BSA (35-75): 50.3 CM2
BH CV ECHO MEAS - LV MAX PG: 4.3 MMHG
BH CV ECHO MEAS - LV MEAN PG: 2.17 MMHG
BH CV ECHO MEAS - LV SYSTOLIC VOL/BSA (12-30): 22.8 CM2
BH CV ECHO MEAS - LV V1 MAX: 103.5 CM/SEC
BH CV ECHO MEAS - LV V1 VTI: 25.3 CM
BH CV ECHO MEAS - LVOT AREA: 3.4 CM2
BH CV ECHO MEAS - LVOT DIAM: 2.08 CM
BH CV ECHO MEAS - MED PEAK E' VEL: 4 CM/SEC
BH CV ECHO MEAS - MV A DUR: 0.11 SEC
BH CV ECHO MEAS - MV A MAX VEL: 101.3 CM/SEC
BH CV ECHO MEAS - MV DEC SLOPE: 530.4 CM/SEC2
BH CV ECHO MEAS - MV DEC TIME: 250 MSEC
BH CV ECHO MEAS - MV E MAX VEL: 132 CM/SEC
BH CV ECHO MEAS - MV E/A: 1.3
BH CV ECHO MEAS - MV MAX PG: 6.8 MMHG
BH CV ECHO MEAS - MV MEAN PG: 2.13 MMHG
BH CV ECHO MEAS - MV P1/2T: 72 MSEC
BH CV ECHO MEAS - MV V2 VTI: 46.1 CM
BH CV ECHO MEAS - MVA(P1/2T): 3.1 CM2
BH CV ECHO MEAS - MVA(VTI): 1.86 CM2
BH CV ECHO MEAS - PA ACC TIME: 0.1 SEC
BH CV ECHO MEAS - PA PR(ACCEL): 35 MMHG
BH CV ECHO MEAS - PA V2 MAX: 99.1 CM/SEC
BH CV ECHO MEAS - PULM A REVS DUR: 0.16 SEC
BH CV ECHO MEAS - PULM A REVS VEL: 20.6 CM/SEC
BH CV ECHO MEAS - PULM DIAS VEL: 23.8 CM/SEC
BH CV ECHO MEAS - PULM S/D: 1.24
BH CV ECHO MEAS - PULM SYS VEL: 29.4 CM/SEC
BH CV ECHO MEAS - RAP SYSTOLE: 3 MMHG
BH CV ECHO MEAS - RV MAX PG: 1.18 MMHG
BH CV ECHO MEAS - RV V1 MAX: 54.4 CM/SEC
BH CV ECHO MEAS - RV V1 VTI: 12.8 CM
BH CV ECHO MEAS - RVSP: 37 MMHG
BH CV ECHO MEAS - SI(MOD-SP2): 36.5 ML/M2
BH CV ECHO MEAS - SI(MOD-SP4): 27.5 ML/M2
BH CV ECHO MEAS - SV(LVOT): 85.7 ML
BH CV ECHO MEAS - SV(MOD-SP2): 61 ML
BH CV ECHO MEAS - SV(MOD-SP4): 46 ML
BH CV ECHO MEAS - TAPSE (>1.6): 1.77 CM
BH CV ECHO MEAS - TR MAX PG: 33.8 MMHG
BH CV ECHO MEAS - TR MAX VEL: 290.5 CM/SEC
BH CV ECHO MEASUREMENTS AVERAGE E/E' RATIO: 21.82
BH CV XLRA - RV BASE: 3.6 CM
BH CV XLRA - RV LENGTH: 6.2 CM
BH CV XLRA - RV MID: 2.6 CM
BH CV XLRA - TDI S': 10.5 CM/SEC
BILIRUB SERPL-MCNC: 0.3 MG/DL (ref 0–1.2)
BUN SERPL-MCNC: 23 MG/DL (ref 8–23)
BUN/CREAT SERPL: 17.8 (ref 7–25)
CALCIUM SPEC-SCNC: 8.8 MG/DL (ref 8.2–9.6)
CHLORIDE SERPL-SCNC: 100 MMOL/L (ref 98–107)
CO2 SERPL-SCNC: 20.7 MMOL/L (ref 22–29)
CREAT SERPL-MCNC: 1.29 MG/DL (ref 0.57–1)
CRP SERPL-MCNC: 0.37 MG/DL (ref 0–0.5)
D DIMER PPP FEU-MCNC: 3.09 MCGFEU/ML (ref 0–0.49)
DEPRECATED RDW RBC AUTO: 44.5 FL (ref 37–54)
EGFRCR SERPLBLD CKD-EPI 2021: 39.3 ML/MIN/1.73
ERYTHROCYTE [DISTWIDTH] IN BLOOD BY AUTOMATED COUNT: 14 % (ref 12.3–15.4)
FERRITIN SERPL-MCNC: 447 NG/ML (ref 13–150)
GLOBULIN UR ELPH-MCNC: 2.4 GM/DL
GLUCOSE BLDC GLUCOMTR-MCNC: 266 MG/DL (ref 70–130)
GLUCOSE BLDC GLUCOMTR-MCNC: 305 MG/DL (ref 70–130)
GLUCOSE BLDC GLUCOMTR-MCNC: 330 MG/DL (ref 70–130)
GLUCOSE BLDC GLUCOMTR-MCNC: 369 MG/DL (ref 70–130)
GLUCOSE SERPL-MCNC: 307 MG/DL (ref 65–99)
HCT VFR BLD AUTO: 41.8 % (ref 34–46.6)
HGB BLD-MCNC: 13.8 G/DL (ref 12–15.9)
LEFT ATRIUM VOLUME INDEX: 51 ML/M2
MAXIMAL PREDICTED HEART RATE: 129 BPM
MCH RBC QN AUTO: 29 PG (ref 26.6–33)
MCHC RBC AUTO-ENTMCNC: 33 G/DL (ref 31.5–35.7)
MCV RBC AUTO: 87.8 FL (ref 79–97)
OSMOLALITY SERPL: 301 MOSM/KG (ref 280–301)
PLATELET # BLD AUTO: 61 10*3/MM3 (ref 140–450)
PMV BLD AUTO: 11 FL (ref 6–12)
POTASSIUM SERPL-SCNC: 4.2 MMOL/L (ref 3.5–5.2)
PROT SERPL-MCNC: 6.2 G/DL (ref 6–8.5)
RBC # BLD AUTO: 4.76 10*6/MM3 (ref 3.77–5.28)
SODIUM SERPL-SCNC: 134 MMOL/L (ref 136–145)
STRESS TARGET HR: 110 BPM
TROPONIN T SERPL-MCNC: 0.02 NG/ML (ref 0–0.03)
WBC NRBC COR # BLD: 1.71 10*3/MM3 (ref 3.4–10.8)

## 2022-07-31 PROCEDURE — 93306 TTE W/DOPPLER COMPLETE: CPT | Performed by: INTERNAL MEDICINE

## 2022-07-31 PROCEDURE — 82728 ASSAY OF FERRITIN: CPT | Performed by: HOSPITALIST

## 2022-07-31 PROCEDURE — 85027 COMPLETE CBC AUTOMATED: CPT | Performed by: HOSPITALIST

## 2022-07-31 PROCEDURE — 97530 THERAPEUTIC ACTIVITIES: CPT

## 2022-07-31 PROCEDURE — 36415 COLL VENOUS BLD VENIPUNCTURE: CPT | Performed by: HOSPITALIST

## 2022-07-31 PROCEDURE — 85379 FIBRIN DEGRADATION QUANT: CPT | Performed by: HOSPITALIST

## 2022-07-31 PROCEDURE — 86140 C-REACTIVE PROTEIN: CPT | Performed by: HOSPITALIST

## 2022-07-31 PROCEDURE — 25010000002 DEXAMETHASONE PER 1 MG: Performed by: HOSPITALIST

## 2022-07-31 PROCEDURE — 81001 URINALYSIS AUTO W/SCOPE: CPT | Performed by: HOSPITALIST

## 2022-07-31 PROCEDURE — 80053 COMPREHEN METABOLIC PANEL: CPT | Performed by: HOSPITALIST

## 2022-07-31 PROCEDURE — 83930 ASSAY OF BLOOD OSMOLALITY: CPT | Performed by: HOSPITALIST

## 2022-07-31 PROCEDURE — 63710000001 INSULIN LISPRO (HUMAN) PER 5 UNITS: Performed by: HOSPITALIST

## 2022-07-31 PROCEDURE — 93306 TTE W/DOPPLER COMPLETE: CPT

## 2022-07-31 PROCEDURE — 97162 PT EVAL MOD COMPLEX 30 MIN: CPT

## 2022-07-31 PROCEDURE — 99222 1ST HOSP IP/OBS MODERATE 55: CPT | Performed by: INTERNAL MEDICINE

## 2022-07-31 PROCEDURE — 84484 ASSAY OF TROPONIN QUANT: CPT | Performed by: HOSPITALIST

## 2022-07-31 PROCEDURE — 82962 GLUCOSE BLOOD TEST: CPT

## 2022-07-31 RX ADMIN — LEVOTHYROXINE SODIUM 25 MCG: 0.03 TABLET ORAL at 09:27

## 2022-07-31 RX ADMIN — SODIUM CHLORIDE 100 ML/HR: 9 INJECTION, SOLUTION INTRAVENOUS at 12:32

## 2022-07-31 RX ADMIN — AMLODIPINE BESYLATE 10 MG: 5 TABLET ORAL at 09:27

## 2022-07-31 RX ADMIN — Medication 10 ML: at 09:32

## 2022-07-31 RX ADMIN — METOPROLOL TARTRATE 12.5 MG: 25 TABLET, FILM COATED ORAL at 09:27

## 2022-07-31 RX ADMIN — GABAPENTIN 400 MG: 400 CAPSULE ORAL at 21:19

## 2022-07-31 RX ADMIN — DEXAMETHASONE SODIUM PHOSPHATE 6 MG: 10 INJECTION INTRAMUSCULAR; INTRAVENOUS at 21:21

## 2022-07-31 RX ADMIN — INSULIN LISPRO 12 UNITS: 100 INJECTION, SOLUTION INTRAVENOUS; SUBCUTANEOUS at 12:32

## 2022-07-31 RX ADMIN — ACETAMINOPHEN 650 MG: 325 TABLET, FILM COATED ORAL at 21:19

## 2022-07-31 RX ADMIN — DEXAMETHASONE SODIUM PHOSPHATE 6 MG: 10 INJECTION INTRAMUSCULAR; INTRAVENOUS at 09:27

## 2022-07-31 RX ADMIN — METOPROLOL TARTRATE 12.5 MG: 25 TABLET, FILM COATED ORAL at 21:19

## 2022-07-31 RX ADMIN — INSULIN LISPRO 8 UNITS: 100 INJECTION, SOLUTION INTRAVENOUS; SUBCUTANEOUS at 09:30

## 2022-07-31 RX ADMIN — SODIUM CHLORIDE 100 ML/HR: 9 INJECTION, SOLUTION INTRAVENOUS at 01:54

## 2022-07-31 RX ADMIN — GABAPENTIN 400 MG: 400 CAPSULE ORAL at 09:27

## 2022-07-31 RX ADMIN — BENZOCAINE AND MENTHOL 1 LOZENGE: 15; 3.6 LOZENGE ORAL at 18:02

## 2022-07-31 RX ADMIN — INSULIN GLARGINE-YFGN 15 UNITS: 100 INJECTION, SOLUTION SUBCUTANEOUS at 21:23

## 2022-07-31 RX ADMIN — MONTELUKAST SODIUM 10 MG: 10 TABLET, FILM COATED ORAL at 21:19

## 2022-07-31 RX ADMIN — BENZOCAINE AND MENTHOL 1 LOZENGE: 15; 3.6 LOZENGE ORAL at 21:25

## 2022-07-31 RX ADMIN — FAMOTIDINE 10 MG: 20 TABLET ORAL at 09:27

## 2022-07-31 RX ADMIN — INSULIN LISPRO 10 UNITS: 100 INJECTION, SOLUTION INTRAVENOUS; SUBCUTANEOUS at 18:01

## 2022-07-31 RX ADMIN — Medication 10 ML: at 21:18

## 2022-07-31 RX ADMIN — ACETAMINOPHEN 650 MG: 325 TABLET, FILM COATED ORAL at 09:27

## 2022-07-31 RX ADMIN — FAMOTIDINE 10 MG: 20 TABLET ORAL at 21:18

## 2022-08-01 ENCOUNTER — TELEPHONE (OUTPATIENT)
Dept: ONCOLOGY | Facility: CLINIC | Age: 87
End: 2022-08-01

## 2022-08-01 LAB
ALBUMIN SERPL-MCNC: 3.4 G/DL (ref 3.5–5.2)
ALBUMIN/GLOB SERPL: 1.4 G/DL
ALP SERPL-CCNC: 65 U/L (ref 39–117)
ALT SERPL W P-5'-P-CCNC: 31 U/L (ref 1–33)
ANION GAP SERPL CALCULATED.3IONS-SCNC: 17 MMOL/L (ref 5–15)
AST SERPL-CCNC: 28 U/L (ref 1–32)
BACTERIA UR QL AUTO: NORMAL /HPF
BASOPHILS # BLD AUTO: 0 10*3/MM3 (ref 0–0.2)
BASOPHILS NFR BLD AUTO: 0 % (ref 0–1.5)
BILIRUB SERPL-MCNC: 0.2 MG/DL (ref 0–1.2)
BILIRUB UR QL STRIP: NEGATIVE
BUN SERPL-MCNC: 28 MG/DL (ref 8–23)
BUN/CREAT SERPL: 21.2 (ref 7–25)
CALCIUM SPEC-SCNC: 9.1 MG/DL (ref 8.2–9.6)
CHLORIDE SERPL-SCNC: 103 MMOL/L (ref 98–107)
CLARITY UR: CLEAR
CO2 SERPL-SCNC: 16 MMOL/L (ref 22–29)
COLOR UR: YELLOW
CREAT SERPL-MCNC: 1.32 MG/DL (ref 0.57–1)
CRP SERPL-MCNC: <0.3 MG/DL (ref 0–0.5)
D DIMER PPP FEU-MCNC: 2.11 MCGFEU/ML (ref 0–0.49)
DEPRECATED RDW RBC AUTO: 43.9 FL (ref 37–54)
EGFRCR SERPLBLD CKD-EPI 2021: 38.2 ML/MIN/1.73
EOSINOPHIL # BLD AUTO: 0 10*3/MM3 (ref 0–0.4)
EOSINOPHIL NFR BLD AUTO: 0 % (ref 0.3–6.2)
ERYTHROCYTE [DISTWIDTH] IN BLOOD BY AUTOMATED COUNT: 13.9 % (ref 12.3–15.4)
FERRITIN SERPL-MCNC: 562 NG/ML (ref 13–150)
GLOBULIN UR ELPH-MCNC: 2.5 GM/DL
GLUCOSE BLDC GLUCOMTR-MCNC: 270 MG/DL (ref 70–130)
GLUCOSE BLDC GLUCOMTR-MCNC: 324 MG/DL (ref 70–130)
GLUCOSE BLDC GLUCOMTR-MCNC: 325 MG/DL (ref 70–130)
GLUCOSE BLDC GLUCOMTR-MCNC: 380 MG/DL (ref 70–130)
GLUCOSE SERPL-MCNC: 351 MG/DL (ref 65–99)
GLUCOSE UR STRIP-MCNC: ABNORMAL MG/DL
HCT VFR BLD AUTO: 38.9 % (ref 34–46.6)
HGB BLD-MCNC: 12.7 G/DL (ref 12–15.9)
HGB UR QL STRIP.AUTO: NEGATIVE
HYALINE CASTS UR QL AUTO: NORMAL /LPF
IMM GRANULOCYTES # BLD AUTO: 0.02 10*3/MM3 (ref 0–0.05)
IMM GRANULOCYTES NFR BLD AUTO: 0.5 % (ref 0–0.5)
KETONES UR QL STRIP: NEGATIVE
LEUKOCYTE ESTERASE UR QL STRIP.AUTO: NEGATIVE
LYMPHOCYTES # BLD AUTO: 0.82 10*3/MM3 (ref 0.7–3.1)
LYMPHOCYTES NFR BLD AUTO: 21.6 % (ref 19.6–45.3)
MCH RBC QN AUTO: 28.5 PG (ref 26.6–33)
MCHC RBC AUTO-ENTMCNC: 32.6 G/DL (ref 31.5–35.7)
MCV RBC AUTO: 87.4 FL (ref 79–97)
MONOCYTES # BLD AUTO: 0.18 10*3/MM3 (ref 0.1–0.9)
MONOCYTES NFR BLD AUTO: 4.7 % (ref 5–12)
NEUTROPHILS NFR BLD AUTO: 2.77 10*3/MM3 (ref 1.7–7)
NEUTROPHILS NFR BLD AUTO: 73.2 % (ref 42.7–76)
NITRITE UR QL STRIP: NEGATIVE
NRBC BLD AUTO-RTO: 0 /100 WBC (ref 0–0.2)
PH UR STRIP.AUTO: 5.5 [PH] (ref 5–8)
PLATELET # BLD AUTO: 74 10*3/MM3 (ref 140–450)
PMV BLD AUTO: 11.7 FL (ref 6–12)
POTASSIUM SERPL-SCNC: 4.2 MMOL/L (ref 3.5–5.2)
PROT SERPL-MCNC: 5.9 G/DL (ref 6–8.5)
PROT UR QL STRIP: ABNORMAL
QT INTERVAL: 419 MS
RBC # BLD AUTO: 4.45 10*6/MM3 (ref 3.77–5.28)
RBC # UR STRIP: NORMAL /HPF
REF LAB TEST METHOD: NORMAL
SODIUM SERPL-SCNC: 136 MMOL/L (ref 136–145)
SP GR UR STRIP: 1.01 (ref 1–1.03)
SQUAMOUS #/AREA URNS HPF: NORMAL /HPF
TROPONIN T SERPL-MCNC: <0.01 NG/ML (ref 0–0.03)
UROBILINOGEN UR QL STRIP: ABNORMAL
WBC # UR STRIP: NORMAL /HPF
WBC NRBC COR # BLD: 3.79 10*3/MM3 (ref 3.4–10.8)

## 2022-08-01 PROCEDURE — 84484 ASSAY OF TROPONIN QUANT: CPT | Performed by: INTERNAL MEDICINE

## 2022-08-01 PROCEDURE — 63710000001 INSULIN LISPRO (HUMAN) PER 5 UNITS: Performed by: NURSE PRACTITIONER

## 2022-08-01 PROCEDURE — 85379 FIBRIN DEGRADATION QUANT: CPT | Performed by: HOSPITALIST

## 2022-08-01 PROCEDURE — 80053 COMPREHEN METABOLIC PANEL: CPT | Performed by: HOSPITALIST

## 2022-08-01 PROCEDURE — 86140 C-REACTIVE PROTEIN: CPT | Performed by: HOSPITALIST

## 2022-08-01 PROCEDURE — 82962 GLUCOSE BLOOD TEST: CPT

## 2022-08-01 PROCEDURE — 25010000002 DEXAMETHASONE PER 1 MG: Performed by: HOSPITALIST

## 2022-08-01 PROCEDURE — 85025 COMPLETE CBC W/AUTO DIFF WBC: CPT | Performed by: HOSPITALIST

## 2022-08-01 PROCEDURE — 82728 ASSAY OF FERRITIN: CPT | Performed by: HOSPITALIST

## 2022-08-01 PROCEDURE — 99231 SBSQ HOSP IP/OBS SF/LOW 25: CPT | Performed by: INTERNAL MEDICINE

## 2022-08-01 PROCEDURE — 63710000001 INSULIN LISPRO (HUMAN) PER 5 UNITS: Performed by: HOSPITALIST

## 2022-08-01 RX ORDER — SODIUM BICARBONATE 650 MG/1
1300 TABLET ORAL 2 TIMES DAILY
Status: DISCONTINUED | OUTPATIENT
Start: 2022-08-01 | End: 2022-08-02 | Stop reason: HOSPADM

## 2022-08-01 RX ORDER — INSULIN LISPRO 100 [IU]/ML
0-14 INJECTION, SOLUTION INTRAVENOUS; SUBCUTANEOUS
Status: DISCONTINUED | OUTPATIENT
Start: 2022-08-01 | End: 2022-08-02 | Stop reason: HOSPADM

## 2022-08-01 RX ADMIN — MONTELUKAST SODIUM 10 MG: 10 TABLET, FILM COATED ORAL at 20:26

## 2022-08-01 RX ADMIN — LEVOTHYROXINE SODIUM 25 MCG: 0.03 TABLET ORAL at 08:51

## 2022-08-01 RX ADMIN — TAMSULOSIN HYDROCHLORIDE 0.4 MG: 0.4 CAPSULE ORAL at 08:51

## 2022-08-01 RX ADMIN — ACETAMINOPHEN 650 MG: 325 TABLET, FILM COATED ORAL at 20:26

## 2022-08-01 RX ADMIN — FAMOTIDINE 10 MG: 20 TABLET ORAL at 08:51

## 2022-08-01 RX ADMIN — INSULIN LISPRO 10 UNITS: 100 INJECTION, SOLUTION INTRAVENOUS; SUBCUTANEOUS at 12:44

## 2022-08-01 RX ADMIN — AMLODIPINE BESYLATE 10 MG: 5 TABLET ORAL at 08:51

## 2022-08-01 RX ADMIN — GABAPENTIN 400 MG: 400 CAPSULE ORAL at 20:27

## 2022-08-01 RX ADMIN — GABAPENTIN 400 MG: 400 CAPSULE ORAL at 08:52

## 2022-08-01 RX ADMIN — INSULIN LISPRO 8 UNITS: 100 INJECTION, SOLUTION INTRAVENOUS; SUBCUTANEOUS at 08:52

## 2022-08-01 RX ADMIN — INSULIN LISPRO 10 UNITS: 100 INJECTION, SOLUTION INTRAVENOUS; SUBCUTANEOUS at 16:57

## 2022-08-01 RX ADMIN — SODIUM BICARBONATE 1300 MG: 650 TABLET ORAL at 20:26

## 2022-08-01 RX ADMIN — METOPROLOL TARTRATE 12.5 MG: 25 TABLET, FILM COATED ORAL at 20:25

## 2022-08-01 RX ADMIN — DEXAMETHASONE SODIUM PHOSPHATE 6 MG: 10 INJECTION INTRAMUSCULAR; INTRAVENOUS at 08:52

## 2022-08-01 RX ADMIN — Medication 10 ML: at 20:27

## 2022-08-01 RX ADMIN — METOPROLOL TARTRATE 12.5 MG: 25 TABLET, FILM COATED ORAL at 08:51

## 2022-08-01 RX ADMIN — FAMOTIDINE 10 MG: 20 TABLET ORAL at 20:25

## 2022-08-01 RX ADMIN — Medication 10 ML: at 08:52

## 2022-08-01 RX ADMIN — INSULIN GLARGINE-YFGN 15 UNITS: 100 INJECTION, SOLUTION SUBCUTANEOUS at 20:27

## 2022-08-01 RX ADMIN — INSULIN LISPRO 12 UNITS: 100 INJECTION, SOLUTION INTRAVENOUS; SUBCUTANEOUS at 20:26

## 2022-08-01 NOTE — DISCHARGE PLACEMENT REQUEST
"Helena Carmen (89 y.o. Female)             Date of Birth   02/20/1931    Social Security Number       Address   [address] [place] [address]    Home Phone   [phone]    MRN   [id number]       Holiness   Druze    Marital Status                               Admission Date   7/30/22    Admission Type   Emergency    Admitting Provider   Brian Bella MD    Attending Provider   Alfredito Cee MD    Department, Room/Bed   [address], S608/1       Discharge Date       Discharge Disposition       Discharge Destination                               Attending Provider: Alfredito Cee MD    Allergies: Erythromycin, Cephalexin, Penicillins, Sulfa Antibiotics    Isolation: Enh Drop/Con   Infection: COVID (confirmed) (07/30/22)   Code Status: No CPR   Advance Care Planning Activity    Ht: 144.8 cm (57\")   Wt: 76.2 kg (168 lb)    Admission Cmt: None   Principal Problem: COVID-19 [U07.1]                 Active Insurance as of 7/30/2022     Primary Coverage     Payor Plan Insurance Group Employer/Plan Group    MEDICARE MEDICARE A & B      Payor Plan Address Payor Plan Phone Number Payor Plan Fax Number Effective Dates    [address] [phone]  2/1/1996 - None Entered    [facility] [id number]       Subscriber Name Subscriber Birth Date Member ID       HELENA CARMEN 2/20/1931 [id number]           Secondary Coverage     Payor Plan Insurance Group Employer/Plan Group     FOR LIFE  FOR LIFE  SUP       Payor Plan Address Payor Plan Phone Number Payor Plan Fax Number Effective Dates    [address] [phone]  4/5/2016 - None Entered    [facility] [address]       Subscriber Name Subscriber Birth Date Member ID       HELENA CARMEN 2/20/1931 [id number]                 Emergency Contacts      (Rel.) Home Phone Work Phone Mobile Phone    Brandon carmen (Son) [phone] -- --    SohaRadha (Daughter) [phone] -- " [phone]    Margaret Loco (Daughter) [phone] -- [phone]

## 2022-08-01 NOTE — PLAN OF CARE
Goal Outcome Evaluation:  Plan of Care Reviewed With: patient        Progress: no change  Outcome Evaluation: vitals stable.  pt expressed pain.  meds given per orders.  turn q 2 hours.  raghav care as needed.  retention noted.  straight cathed per protocol.  external catheter in place.  isolation precautions maintained.  will continue to monitor.

## 2022-08-01 NOTE — PROGRESS NOTES
"    Patient Name: Helena Carmen  :1931  89 y.o.      Patient Care Team:  Mariah Hickman MD as PCP - General (Family Medicine)  Beth Butcher MD as Consulting Physician (Cardiology)  Rolando Wick MD as Consulting Physician (Nephrology)  Pablo Sherman Jr., MD as Consulting Physician (Hematology and Oncology)  Mango Arnold MD as Referring Physician (Internal Medicine)    Chief Complaint: COVID CP    Interval History: pt not examined due to covid.        Objective   Vital Signs  Temp:  [95.4 °F (35.2 °C)-96.5 °F (35.8 °C)] 95.4 °F (35.2 °C)  Heart Rate:  [53-60] 53  Resp:  [18-20] 18  BP: (159-165)/(69-71) 162/71    Intake/Output Summary (Last 24 hours) at 2022 1245  Last data filed at 2022 1028  Gross per 24 hour   Intake 3538 ml   Output 3450 ml   Net 88 ml     Flowsheet Rows    Flowsheet Row First Filed Value   Admission Height 144.8 cm (57\") Documented at 2022 1038   Admission Weight 72.1 kg (159 lb) Documented at 2022 1038          Physical Exam:   General Appearance:    Alert, cooperative, in no acute distress   Lungs:     Clear to auscultation.  Normal respiratory effort and rate.      Heart:    Regular rhythm and normal rate, normal S1 and S2, no murmurs, gallops or rubs.     Chest Wall:    No abnormalities observed   Abdomen:     Soft, nontender, positive bowel sounds.     Extremities:   no cyanosis, clubbing or edema.  No marked joint deformities.  Adequate musculoskeletal strength.       Results Review:    Results from last 7 days   Lab Units 22  0920   SODIUM mmol/L 134*   POTASSIUM mmol/L 4.2   CHLORIDE mmol/L 100   CO2 mmol/L 20.7*   BUN mg/dL 23   CREATININE mg/dL 1.29*   GLUCOSE mg/dL 307*   CALCIUM mg/dL 8.8     Results from last 7 days   Lab Units 22  0920 22  1034   TROPONIN T ng/mL 0.017 0.037*     Results from last 7 days   Lab Units 22  1017   WBC 10*3/mm3 3.79   HEMOGLOBIN g/dL 12.7   HEMATOCRIT % 38.9   PLATELETS 10*3/mm3 " 74*     Results from last 7 days   Lab Units 07/30/22  1034   INR  1.22*     Results from last 7 days   Lab Units 07/30/22  1034   MAGNESIUM mg/dL 1.8                   Medication Review:   amLODIPine, 10 mg, Oral, Daily  dexamethasone, 6 mg, Intravenous, BID  famotidine, 10 mg, Oral, BID  gabapentin, 400 mg, Oral, BID  insulin glargine, 15 Units, Subcutaneous, Nightly  insulin lispro, 0-14 Units, Subcutaneous, TID AC  levothyroxine, 25 mcg, Oral, Daily  metoprolol tartrate, 12.5 mg, Oral, BID  montelukast, 10 mg, Oral, Nightly  sodium chloride, 10 mL, Intravenous, Q12H  tamsulosin, 0.4 mg, Oral, Daily              Assessment & Plan   1. COVID  2. Leukopenia  3. ITP  4. CP  5. S/p AVR    Echo without wall motion abnl. Valve normally functioning.   Troponin is flat/down trending.   I would not pursue any further cardiac workup at this time. She has a lot of other issues right now particularly with her blood count, platelets, COVID etc.     Vicki Paredes MD  [facility]  08/01/22  12:45 EDT

## 2022-08-01 NOTE — PROGRESS NOTES
Name: Helena Carmen ADMIT: 2022   : 1931  PCP: Mariah Hickman MD    MRN: [id number] LOS: 2 days   AGE/SEX: 89 y.o. female  ROOM: [facility]     Subjective   Subjective   Patient denies complaints. Had some urine retnetion overnight requiring I/O cath.  Apparently had Hoffman catheter at home until about a week ago    Review of Systems     Objective   Objective   Vital Signs  Temp:  [95.4 °F (35.2 °C)-97.3 °F (36.3 °C)] 97.3 °F (36.3 °C)  Heart Rate:  [53-87] 87  Resp:  [18-20] 18  BP: (156-165)/(59-71) 156/70  SpO2:  [97 %-98 %] 97 %  on   ;   Device (Oxygen Therapy): room air  Body mass index is 36.35 kg/m².  Physical Exam  Vitals and nursing note reviewed.   Constitutional:       General: She is not in acute distress.     Appearance: She is ill-appearing. She is not toxic-appearing.   HENT:      Head: Normocephalic and atraumatic.      Mouth/Throat:      Mouth: Mucous membranes are moist.   Eyes:      General: No scleral icterus.  Neck:      Vascular: No JVD.   Cardiovascular:      Rate and Rhythm: Normal rate and regular rhythm.      Pulses: Normal pulses.      Heart sounds: Normal heart sounds. No murmur heard.  Pulmonary:      Effort: Pulmonary effort is normal. No respiratory distress.      Breath sounds: Normal breath sounds.   Abdominal:      General: Bowel sounds are normal. There is no distension.      Palpations: Abdomen is soft.      Tenderness: There is no abdominal tenderness.   Musculoskeletal:         General: No swelling or tenderness.      Cervical back: Neck supple.   Skin:     General: Skin is warm and dry.      Coloration: Skin is not jaundiced.      Findings: No rash.   Neurological:      Mental Status: She is alert and oriented to person, place, and time.   Psychiatric:         Mood and Affect: Mood normal.         Behavior: Behavior normal.         Results Review     I reviewed the patient's new clinical results.  Results from last 7 days   Lab Units 22  1013  07/31/22  0920 07/30/22  1034   WBC 10*3/mm3 3.79 1.71* 3.88   HEMOGLOBIN g/dL 12.7 13.8 12.6   PLATELETS 10*3/mm3 74* 61* 46*     Results from last 7 days   Lab Units 08/01/22 1017 07/31/22  0920 07/30/22  1034   SODIUM mmol/L 136 134* 128*   POTASSIUM mmol/L 4.2 4.2 4.3   CHLORIDE mmol/L 103 100 96*   CO2 mmol/L 16.0* 20.7* 18.0*   BUN mg/dL 28* 23 19   CREATININE mg/dL 1.32* 1.29* 1.45*   GLUCOSE mg/dL 351* 307* 155*   EGFR mL/min/1.73 38.2* 39.3* 34.1*     Results from last 7 days   Lab Units 08/01/22 1017 07/31/22 0920 07/30/22  1034   ALBUMIN g/dL 3.40* 3.80 3.80   BILIRUBIN mg/dL 0.2 0.3 0.4   ALK PHOS U/L 65 73 73   AST (SGOT) U/L 28 37* 46*   ALT (SGPT) U/L 31 41* 40*     Results from last 7 days   Lab Units 08/01/22 1017 07/31/22 0920 07/30/22  1034   CALCIUM mg/dL 9.1 8.8 9.2   ALBUMIN g/dL 3.40* 3.80 3.80   MAGNESIUM mg/dL  --   --  1.8       Glucose   Date/Time Value Ref Range Status   08/01/2022 1550 324 (H) 70 - 130 mg/dL Final     Comment:     Meter: [id number] : [id number] Sarahi JOHNSON   08/01/2022 1052 325 (H) 70 - 130 mg/dL Final     Comment:     Meter: [id number] : [id number] Sarahi JOHNSON   08/01/2022 0642 270 (H) 70 - 130 mg/dL Final     Comment:     Meter: [id number] : [id number] Yazmin Guerra RN   07/31/2022 2019 305 (H) 70 - 130 mg/dL Final     Comment:     Meter: [id number] : [id number] Caryn Aguirre CNA   07/31/2022 1615 330 (H) 70 - 130 mg/dL Final     Comment:     Meter: [id number] : [id number] Sandra Nugent NA   07/31/2022 1141 369 (H) 70 - 130 mg/dL Final     Comment:     Meter: [id number] : [id number] Sandra Nugent NA   07/31/2022 0639 266 (H) 70 - 130 mg/dL Final     Comment:     Meter: [id number] : [id number] Tramaine JOHNSON       No radiology results for the last day  Scheduled Medications  amLODIPine, 10 mg, Oral, Daily  dexamethasone, 6 mg, Intravenous, BID  famotidine, 10 mg, Oral, BID  gabapentin, 400 mg, Oral, BID  insulin  glargine, 15 Units, Subcutaneous, Nightly  insulin lispro, 0-14 Units, Subcutaneous, TID AC  levothyroxine, 25 mcg, Oral, Daily  metoprolol tartrate, 12.5 mg, Oral, BID  montelukast, 10 mg, Oral, Nightly  sodium chloride, 10 mL, Intravenous, Q12H  tamsulosin, 0.4 mg, Oral, Daily    Infusions   Diet  Diet Regular; Consistent Carbohydrate       Assessment/Plan     Active Hospital Problems    Diagnosis  POA   • **COVID-19 [U07.1]  Yes   • Anemia, chronic disease [D63.8]  Unknown   • Thrombocytopenia due to COVID-19 virus [U07.1, D69.59]  Unknown   • Hyponatremia [E87.1]  Unknown   • Weakness generalized [R53.1]  Unknown   • Other cirrhosis of liver (HCC) [K74.69]  Yes   • Chronic heart failure with preserved ejection fraction (HCC) [I50.32]  Yes   • Hypothyroidism [E03.9]  Yes   • Malignant neoplasm of uterus (HCC) [C55]  Yes   • Type 2 diabetes mellitus with hyperglycemia (HCC) [E11.65]  Yes   • Pulmonary hypertension (HCC) [I27.20]  Yes   • Paroxysmal atrial fibrillation (HCC) [I48.0]  Yes   • Legally blind [H54.8]  Yes   • Aortic valve stenosis [I35.0]  Yes   • Chronic kidney disease [N18.9]  Yes      Resolved Hospital Problems   No resolved problems to display.       89 y.o. female with multiple chronic medical issues admitted with COVID-19.    COVID-19- stable.  Normal O2 sats  -Decrease dexamethasone.  Does not need twice daily dosing.  Was only mildly hypoxic briefly, probably could get by with a short course followed by transition to chronic prednisone dosing  - Not sure of exact onset of symptoms so I do not think remdesivir would be helpful  - Follow serial labs.  All appear except ferritin improved today including elevated D-dimer.     Pancytopenia related to above-she has chronic ITP managed with prednisone.   -Leukopenia improved  - Monitor daily     DM2 with hyperglycemia- continue scheduled nighttime Lantus given hyperglycemia with steroid use.  May be able to wean down or stop as steroids  decrease    CKD is stable as are liver enzymes at this time    PAF-HR stable on metoprolol.  Not on AC chronically presumably due to the platelet issue.    Urine retention- appears to be a chronic issue.  Follows with urology outpatient, will consult them to see here for further recommendations      SCDs for DVT prophylaxis.  DNR.  Dispo: Home at d/c, ?1-2 days      Alfredito Cee MD  [facility]  08/01/22  17:18 EDT

## 2022-08-01 NOTE — CASE MANAGEMENT/SOCIAL WORK
Discharge Planning Assessment  [facility]     Patient Name: Helena Carmen  MRN: [id number]  Today's Date: 8/1/2022    Admit Date: 7/30/2022     Discharge Needs Assessment     Row Name 08/01/22 1017       Living Environment    People in Home child(edison), adult    Name(s) of People in Home Radha    Current Living Arrangements home    Primary Care Provided by child(edison)    Provides Primary Care For no one, unable/limited ability to care for self    Family Caregiver if Needed child(edison), adult    Family Caregiver Names Radha [phone], Margaret [phone], Brandon [phone]    Quality of Family Relationships helpful;involved    Able to Return to Prior Arrangements yes       Resource/Environmental Concerns    Resource/Environmental Concerns none    Transportation Concerns none       Transition Planning    Patient/Family Anticipates Transition to home with family    Patient/Family Anticipated Services at Transition home health care    Transportation Anticipated family or friend will provide       Discharge Needs Assessment    Readmission Within the Last 30 Days no previous admission in last 30 days    Current Outpatient/Agency/Support Group homecare agency    Equipment Currently Used at Home walker, rolling;shower chair;wheelchair    Concerns to be Addressed no discharge needs identified;denies needs/concerns at this time    Anticipated Changes Related to Illness none    Equipment Needed After Discharge none               Discharge Plan     Row Name 08/01/22 1019       Plan    Plan Return home with family providing 24/7 care    Patient/Family in Agreement with Plan yes    Plan Comments IMM checked. Spoke with daughter Radha [phone] via phone due to panedmic isolation precautons. Introduced self and explained CCP role. Facesheet and PCP verified. Patient lives at home and family provides 24/7 care, assisting patient with showers, meds and meal prep. Patient is current with [facility]. Patient has been to  [facility] and would Not return. Patient has walker, transport WC ,Shower chair. Patient uses [facility] on [address]. Family able to provide transport at [place]. Spoke with Annalise/[facility] to notifiy of patient admission and [facility]. [facility]nal[facility] stated she [facility]. Family anticpates no DC needs at this time. CCP will follow. Juan GARAY              Continued Care and Services - Admitted Since 7/30/2022     Home Medical Care     Service Provider Request Status Selected Services Address Phone Fax Patient Preferred    [facility],[place]  Pending - No Request Sent N/A [address], [address], [facility] [address] [phone] --            Selected Continued Care - Prior Encounters Includes selections from prior encounters from 5/1/2022 to 8/1/2022    Discharged on 6/9/2022 Admission date: 6/5/2022 - Discharge disposition: Skilled Nursing Facility (DC - External)    Destination     Service Provider Selected Services Address Phone Fax Patient Preferred    [facility]  Skilled Nursing [address] [facility] [address] [phone] --                    Expected Discharge Date and Time     Expected Discharge Date Expected Discharge Time    Aug 5, 2022          Demographic Summary     Row Name 08/01/22 1017       General Information    Admission Type inpatient    Referral Source admission list    Preferred Language English               Functional Status     Row Name 08/01/22 1017       Functional Status    Usual Activity Tolerance moderate    Current Activity Tolerance moderate       Functional Status, IADL    Medications assistive person    Meal Preparation assistive person    Housekeeping assistive person    Laundry assistive person    Shopping assistive person       Mental Status Summary    Recent Changes in Mental Status/Cognitive Functioning unable to assess               Psychosocial    No documentation.                 Abuse/Neglect    No documentation.                Legal    No documentation.                Substance Abuse    No documentation.                Patient Forms    No documentation.                   Juan Spain RN

## 2022-08-01 NOTE — CONSULTS
FIRST UROLOGY CONSULT      Patient Identification:  NAME:  Helena Carmen  Age:  89 y.o.   Sex:  female   :  1931   MRN:  [id number]       Chief complaint: Yane voiding    History of present illness: 89-year-old female multiple health issues now admitted with COVID.  She was seen by my partner Dr. Webber and had her catheter removed at her [place] office last week after she had been in retention and through the week she had been voiding well.  Good stream and emptying well no incontinence.  Since admissions over the weekend they have had to catheterize her on at least 2 occasions but she has voided as well.  Nuys any discomfort.  No dysuria.      Past medical history:  Past Medical History:   Diagnosis Date   • Aortic valve stenosis     s/p tissue AVR   • Back pain    • CKD (chronic kidney disease)    • Colitis due to Clostridioides difficile 2022   • Diastolic dysfunction     Grade 2 per echocardiogram    • Diverticulosis    • Exertional shortness of breath    • Heart disease    • Hiatal hernia    • Hyperlipidemia    • Hypertension    • Hyperthyroidism    • Hypertriglyceridemia 2018   • Hypothyroidism    • Left ventricular hypertrophy    • Legally blind    • Liver disease    • Macular degeneration    • Mitral regurgitation    • Osteoarthritis of hip    • Pancreatitis 2022   • Paroxysmal atrial fibrillation (HCC)    • Premature ventricular contractions    • Pulmonary hypertension (HCC)    • Renal insufficiency syndrome    • Type 2 diabetes mellitus (HCC)    • Uterine cancer (HCC)        Past surgical history:  Past Surgical History:   Procedure Laterality Date   • AORTIC VALVE REPAIR/REPLACEMENT     • CATARACT EXTRACTION      ,    • ENDOSCOPY  08/15/2014    no gross lesions in stomach/duodenum, erythrematous mucosa in stomach   • HYSTERECTOMY     • STERNOTOMY         Allergies:  Erythromycin, Cephalexin, Penicillins, and Sulfa antibiotics    Home  medications:  Medications Prior to Admission   Medication Sig Dispense Refill Last Dose   • acetaminophen (TYLENOL) 325 MG tablet Take 650 mg by mouth Every 6 (Six) Hours As Needed.   Past Month at Unknown time   • amLODIPine (NORVASC) 10 MG tablet Take 1 tablet by mouth daily.   2022 at Unknown time   • famotidine (PEPCID) 10 MG tablet Take 10 mg by mouth 2 (Two) Times a Day.   2022 at Unknown time   • furosemide (LASIX) 20 MG tablet Take 1 tablet by mouth Daily. 30 tablet 11 2022 at Unknown time   • gabapentin (NEURONTIN) 400 MG capsule Take 400 mg by mouth 2 (Two) Times a Day.   2022 at Unknown time   • insulin lispro (humaLOG) 100 UNIT/ML injection Inject 0-10 Units under the skin into the appropriate area as directed 3 (Three) Times a Day Before Meals. 2 units for every 50 > 150   2022 at Unknown time   • levothyroxine (SYNTHROID, LEVOTHROID) 25 MCG tablet Take 1 tablet by mouth daily.   2022 at Unknown time   • LORazepam (ATIVAN) 0.5 MG tablet    2022 at Unknown time   • metoprolol tartrate (LOPRESSOR) 25 MG tablet Take 12.5 mg by mouth 2 (Two) Times a Day.   2022 at Unknown time   • montelukast (SINGULAIR) 10 MG tablet Take 1 tablet by mouth Every Night.   2022 at Unknown time   • polyethylene glycol (MIRALAX) 17 GM/SCOOP powder Take 17 g by mouth Daily.   2022 at Unknown time   • predniSONE (DELTASONE) 5 MG tablet Take 10 mg by mouth Every Other Day.   Past Week at Unknown time   • rosuvastatin (CRESTOR) 5 MG tablet Take 1 tablet by mouth Daily.   2022 at Unknown time   • tamsulosin (FLOMAX) 0.4 MG capsule 24 hr capsule Take 0.4 mg by mouth every night at bedtime.   2022 at Unknown time   • [] bebtelovimab 175 MG/2ML injection Infuse 2 mL into a venous catheter 1 (One) Time for 1 dose. 2 mL 0        Hospital medications:  amLODIPine, 10 mg, Oral, Daily  dexamethasone, 6 mg, Intravenous, BID  famotidine, 10 mg, Oral, BID  gabapentin, 400 mg,  Oral, BID  insulin glargine, 15 Units, Subcutaneous, Nightly  insulin lispro, 0-14 Units, Subcutaneous, TID AC  levothyroxine, 25 mcg, Oral, Daily  metoprolol tartrate, 12.5 mg, Oral, BID  montelukast, 10 mg, Oral, Nightly  sodium chloride, 10 mL, Intravenous, Q12H  tamsulosin, 0.4 mg, Oral, Daily         •  acetaminophen  •  benzocaine-menthol  •  dextrose  •  dextrose  •  glucagon (human recombinant)  •  melatonin  •  nitroglycerin  •  ondansetron **OR** ondansetron  •  [COMPLETED] Insert peripheral IV **AND** sodium chloride  •  sodium chloride    Family history:  Family History   Problem Relation Age of Onset   • Heart disease Mother    • Hypertension Mother    • Stroke Mother    • Diabetes Mother         mellitus   • Other Other         cardiovascular disorder       Social history:  Social History     Tobacco Use   • Smoking status: Former Smoker     Packs/day: 0.50     Quit date: 7/10/1969     Years since quittin.0   • Smokeless tobacco: Never Used   Substance Use Topics   • Alcohol use: No     Comment: caffeine use - coffee 2 cups daily   • Drug use: No       Review of systems:    Negative 12-system ROS except for the following: As above      Objective:  TMax 24 hours:   Temp (24hrs), Av.1 °F (35.6 °C), Min:95.4 °F (35.2 °C), Max:96.5 °F (35.8 °C)      Vitals Ranges:   Temp:  [95.4 °F (35.2 °C)-96.5 °F (35.8 °C)] 95.4 °F (35.2 °C)  Heart Rate:  [53-60] 53  Resp:  [18-20] 18  BP: (159-165)/(59-71) 165/59    Intake/Output Last 3 shifts:  I/O last 3 completed shifts:  In: 4738 [P.O.:1040; I.V.:3698]  Out: 4250 [Urine:4250]     Physical Exam:       General Appearance:    Alert, cooperative, in no acute distress   Head:    Normocephalic, without obvious abnormality, atraumatic   Eyes:          PERRL, conjunctivae and corneas clear   Ears:    Normal external inspection   Throat:   No oral lesions, oral mucosa moist   Neck:   Supple, no LAD, trachea midline   Back:     No CVA tenderness   Lungs:      Respirations unlabored, symmetric excursion    Heart:    RRR, intact peripheral pulses   Abdomen:    Benign   :       Extremities:   No edema, no deformity   Skin:   No bleeding, bruising or rashes   Neuro/Psych:   Orientation intact, mood/affect pleasant, no focal findings       Results review:   I reviewed the patient's new clinical results.    Data review:  Lab Results (last 24 hours)     Procedure Component Value Units Date/Time    Comprehensive Metabolic Panel [[id number]]  (Abnormal) Collected: 08/01/22 1017    Specimen: Blood Updated: 08/01/22 1312     Glucose 351 mg/dL      BUN 28 mg/dL      Creatinine 1.32 mg/dL      Sodium 136 mmol/L      Potassium 4.2 mmol/L      Chloride 103 mmol/L      CO2 16.0 mmol/L      Calcium 9.1 mg/dL      Total Protein 5.9 g/dL      Albumin 3.40 g/dL      ALT (SGPT) 31 U/L      AST (SGOT) 28 U/L      Alkaline Phosphatase 65 U/L      Total Bilirubin 0.2 mg/dL      Globulin 2.5 gm/dL      A/G Ratio 1.4 g/dL      BUN/Creatinine Ratio 21.2     Anion Gap 17.0 mmol/L      eGFR 38.2 mL/min/1.73      Comment: National Kidney Foundation and American Society of Nephrology (ASN) Task Force recommended calculation based on the Chronic Kidney Disease Epidemiology Collaboration (CKD-EPI) equation refit without adjustment for race.       Narrative:      GFR Normal >60  Chronic Kidney Disease <60  Kidney Failure <15      Troponin [[id number]]  (Normal) Collected: 08/01/22 1017    Specimen: Blood Updated: 08/01/22 1312     Troponin T <0.010 ng/mL     Narrative:      Troponin T Reference Range:  <= 0.03 ng/mL-   Negative for AMI  >0.03 ng/mL-     Abnormal for myocardial necrosis.  Clinicians would have to utilize clinical acumen, EKG, Troponin and serial changes to determine if it is an Acute Myocardial Infarction or myocardial injury due to an underlying chronic condition.       Results may be falsely decreased if patient taking Biotin.      C-reactive Protein [[id number]]  (Normal) Collected:  08/01/22 1017    Specimen: Blood Updated: 08/01/22 1312     C-Reactive Protein <0.30 mg/dL     Ferritin [[id number]]  (Abnormal) Collected: 08/01/22 1017    Specimen: Blood Updated: 08/01/22 1156     Ferritin 562.00 ng/mL     Narrative:      Results may be falsely decreased if patient taking Biotin.      D-dimer, Quantitative [[id number]]  (Abnormal) Collected: 08/01/22 1017    Specimen: Blood Updated: 08/01/22 1140     D-Dimer, Quantitative 2.11 MCGFEU/mL     Narrative:      The Stago D-Dimer test used in conjunction with a clinical pretest probability (PTP) assessment model, has been approved by the FDA to rule out the presence of venous thromboembolism (VTE) in outpatients suspected of deep venous thrombosis (DVT) or pulmonary embolism (PE). The cut-off for negative predictive value is <0.50 MCGFEU/mL.    CBC & Differential [[id number]]  (Abnormal) Collected: 08/01/22 1017    Specimen: Blood Updated: 08/01/22 1135    Narrative:      The following orders were created for panel order CBC & Differential.  Procedure                               Abnormality         Status                     ---------                               -----------         ------                     CBC Auto Differential[[id number]]        Abnormal            Final result                 Please view results for these tests on the individual orders.    CBC Auto Differential [[id number]]  (Abnormal) Collected: 08/01/22 1017    Specimen: Blood Updated: 08/01/22 1135     WBC 3.79 10*3/mm3      RBC 4.45 10*6/mm3      Hemoglobin 12.7 g/dL      Hematocrit 38.9 %      MCV 87.4 fL      MCH 28.5 pg      MCHC 32.6 g/dL      RDW 13.9 %      RDW-SD 43.9 fl      MPV 11.7 fL      Platelets 74 10*3/mm3      Neutrophil % 73.2 %      Lymphocyte % 21.6 %      Monocyte % 4.7 %      Eosinophil % 0.0 %      Basophil % 0.0 %      Immature Grans % 0.5 %      Neutrophils, Absolute 2.77 10*3/mm3      Lymphocytes, Absolute 0.82 10*3/mm3      Monocytes, Absolute 0.18  10*3/mm3      Eosinophils, Absolute 0.00 10*3/mm3      Basophils, Absolute 0.00 10*3/mm3      Immature Grans, Absolute 0.02 10*3/mm3      nRBC 0.0 /100 WBC     POC Glucose Once [[id number]]  (Abnormal) Collected: 08/01/22 1052    Specimen: Blood Updated: 08/01/22 1054     Glucose 325 mg/dL      Comment: Meter: [id number] : [id number] Sarahi Jerri ELIZABETH       POC Glucose Once [[id number]]  (Abnormal) Collected: 08/01/22 0642    Specimen: Blood Updated: 08/01/22 0643     Glucose 270 mg/dL      Comment: Meter: [id number] : [id number] Yazmin Guerra RN       Urinalysis, Microscopic Only - Urine, Catheter [[id number]] Collected: 07/31/22 2144    Specimen: Urine, Catheter Updated: 08/01/22 0030     RBC, UA 0-2 /HPF      WBC, UA 0-2 /HPF      Bacteria, UA None Seen /HPF      Squamous Epithelial Cells, UA 0-2 /HPF      Hyaline Casts, UA None Seen /LPF      Methodology Automated Microscopy    Urinalysis With Microscopic If Indicated (No Culture) - Urine, Catheter [[id number]]  (Abnormal) Collected: 07/31/22 2144    Specimen: Urine, Catheter Updated: 08/01/22 0028     Color, UA Yellow     Appearance, UA Clear     pH, UA 5.5     Specific Gravity, UA 1.012     Glucose,  mg/dL (2+)     Ketones, UA Negative     Bilirubin, UA Negative     Blood, UA Negative     Protein, UA 30 mg/dL (1+)     Leuk Esterase, UA Negative     Nitrite, UA Negative     Urobilinogen, UA 0.2 E.U./dL    POC Glucose Once [[id number]]  (Abnormal) Collected: 07/31/22 2019    Specimen: Blood Updated: 07/31/22 2020     Glucose 305 mg/dL      Comment: Meter: [id number] : [id number] Caryn Aguirre CNA       POC Glucose Once [[id number]]  (Abnormal) Collected: 07/31/22 1615    Specimen: Blood Updated: 07/31/22 1617     Glucose 330 mg/dL      Comment: Meter: [id number] : [id number] Sandra JOHNSON              Imaging:  Imaging Results (Last 24 Hours)     ** No results found for the last 24 hours. **             Assessment:       COVID-19    Aortic  valve stenosis    Legally blind    Pulmonary hypertension (HCC)    Paroxysmal atrial fibrillation (HCC)    Chronic kidney disease    Type 2 diabetes mellitus with hyperglycemia (HCC)    Malignant neoplasm of uterus (HCC)    Chronic heart failure with preserved ejection fraction (HCC)    Other cirrhosis of liver (HCC)    Hypothyroidism    Anemia, chronic disease    Thrombocytopenia due to COVID-19 virus    Hyponatremia    Weakness generalized    Urinary retention multiple factors    Plan:     Hold on Hoffman catheter.  Continue intermittent catheterization and monitor residuals.  We will follow along.    Hamzah Darling MD  08/01/22  13:22 EDT

## 2022-08-01 NOTE — TELEPHONE ENCOUNTER
Caller: Radha Hoyos    Relationship to patient: DAUGHTER    Best call back number: [phone]    Chief complaint:  DX WITH COVID ON 7/29/2022, TAKEN BY [facility] TO HOSP. ON 7/30/2022, PLATELETS ARE LOW & RADHA IS HOPING DR. DIXON WILL ROUND ON HER.    Type of visit: LAB/FOLLOW UP 1    When is the original appointment: 8/5/2022

## 2022-08-02 ENCOUNTER — READMISSION MANAGEMENT (OUTPATIENT)
Dept: CALL CENTER | Facility: HOSPITAL | Age: 87
End: 2022-08-02

## 2022-08-02 VITALS
WEIGHT: 168 LBS | RESPIRATION RATE: 20 BRPM | OXYGEN SATURATION: 90 % | DIASTOLIC BLOOD PRESSURE: 64 MMHG | SYSTOLIC BLOOD PRESSURE: 166 MMHG | BODY MASS INDEX: 36.24 KG/M2 | TEMPERATURE: 97.3 F | HEIGHT: 57 IN | HEART RATE: 53 BPM

## 2022-08-02 PROBLEM — E87.1 HYPONATREMIA: Status: RESOLVED | Noted: 2022-07-30 | Resolved: 2022-08-02

## 2022-08-02 PROBLEM — R33.9 URINE RETENTION: Status: ACTIVE | Noted: 2022-08-02

## 2022-08-02 PROBLEM — D72.819 LEUKOPENIA: Status: RESOLVED | Noted: 2022-08-02 | Resolved: 2022-08-02

## 2022-08-02 PROBLEM — D89.831 CYTOKINE RELEASE SYNDROME, GRADE 1: Status: ACTIVE | Noted: 2022-08-02

## 2022-08-02 LAB
ALBUMIN SERPL-MCNC: 3.4 G/DL (ref 3.5–5.2)
ALBUMIN/GLOB SERPL: 1.3 G/DL
ALP SERPL-CCNC: 60 U/L (ref 39–117)
ALT SERPL W P-5'-P-CCNC: 34 U/L (ref 1–33)
ANION GAP SERPL CALCULATED.3IONS-SCNC: 14.4 MMOL/L (ref 5–15)
AST SERPL-CCNC: 29 U/L (ref 1–32)
BILIRUB SERPL-MCNC: 0.2 MG/DL (ref 0–1.2)
BUN SERPL-MCNC: 33 MG/DL (ref 8–23)
BUN/CREAT SERPL: 26.6 (ref 7–25)
CALCIUM SPEC-SCNC: 8.8 MG/DL (ref 8.2–9.6)
CHLORIDE SERPL-SCNC: 103 MMOL/L (ref 98–107)
CO2 SERPL-SCNC: 18.6 MMOL/L (ref 22–29)
CREAT SERPL-MCNC: 1.24 MG/DL (ref 0.57–1)
CRP SERPL-MCNC: <0.3 MG/DL (ref 0–0.5)
DEPRECATED RDW RBC AUTO: 44.4 FL (ref 37–54)
EGFRCR SERPLBLD CKD-EPI 2021: 41.2 ML/MIN/1.73
ERYTHROCYTE [DISTWIDTH] IN BLOOD BY AUTOMATED COUNT: 14.1 % (ref 12.3–15.4)
FERRITIN SERPL-MCNC: 564 NG/ML (ref 13–150)
GLOBULIN UR ELPH-MCNC: 2.6 GM/DL
GLUCOSE BLDC GLUCOMTR-MCNC: 227 MG/DL (ref 70–130)
GLUCOSE BLDC GLUCOMTR-MCNC: 273 MG/DL (ref 70–130)
GLUCOSE SERPL-MCNC: 243 MG/DL (ref 65–99)
HCT VFR BLD AUTO: 37.6 % (ref 34–46.6)
HGB BLD-MCNC: 12.6 G/DL (ref 12–15.9)
LYMPHOCYTES # BLD MANUAL: 0.64 10*3/MM3 (ref 0.7–3.1)
LYMPHOCYTES NFR BLD MANUAL: 7.1 % (ref 5–12)
MCH RBC QN AUTO: 28.8 PG (ref 26.6–33)
MCHC RBC AUTO-ENTMCNC: 33.5 G/DL (ref 31.5–35.7)
MCV RBC AUTO: 85.8 FL (ref 79–97)
MONOCYTES # BLD: 0.32 10*3/MM3 (ref 0.1–0.9)
NEUTROPHILS # BLD AUTO: 3.56 10*3/MM3 (ref 1.7–7)
NEUTROPHILS NFR BLD MANUAL: 78.8 % (ref 42.7–76)
PLAT MORPH BLD: NORMAL
PLATELET # BLD AUTO: 82 10*3/MM3 (ref 140–450)
PMV BLD AUTO: 10.9 FL (ref 6–12)
POTASSIUM SERPL-SCNC: 4.1 MMOL/L (ref 3.5–5.2)
PROT SERPL-MCNC: 6 G/DL (ref 6–8.5)
RBC # BLD AUTO: 4.38 10*6/MM3 (ref 3.77–5.28)
RBC MORPH BLD: NORMAL
SMUDGE CELLS BLD QL SMEAR: ABNORMAL
SODIUM SERPL-SCNC: 136 MMOL/L (ref 136–145)
VARIANT LYMPHS NFR BLD MANUAL: 14.1 % (ref 19.6–45.3)
WBC NRBC COR # BLD: 4.52 10*3/MM3 (ref 3.4–10.8)

## 2022-08-02 PROCEDURE — 85007 BL SMEAR W/DIFF WBC COUNT: CPT | Performed by: HOSPITALIST

## 2022-08-02 PROCEDURE — 80053 COMPREHEN METABOLIC PANEL: CPT | Performed by: HOSPITALIST

## 2022-08-02 PROCEDURE — 63710000001 INSULIN LISPRO (HUMAN) PER 5 UNITS: Performed by: NURSE PRACTITIONER

## 2022-08-02 PROCEDURE — 86140 C-REACTIVE PROTEIN: CPT | Performed by: HOSPITALIST

## 2022-08-02 PROCEDURE — 82962 GLUCOSE BLOOD TEST: CPT

## 2022-08-02 PROCEDURE — 63710000001 DEXAMETHASONE PER 0.25 MG: Performed by: HOSPITALIST

## 2022-08-02 PROCEDURE — 85025 COMPLETE CBC W/AUTO DIFF WBC: CPT | Performed by: HOSPITALIST

## 2022-08-02 PROCEDURE — 97110 THERAPEUTIC EXERCISES: CPT

## 2022-08-02 PROCEDURE — 82728 ASSAY OF FERRITIN: CPT | Performed by: HOSPITALIST

## 2022-08-02 RX ORDER — SODIUM BICARBONATE 650 MG/1
650 TABLET ORAL 2 TIMES DAILY
Qty: 60 TABLET | Refills: 0 | Status: SHIPPED | OUTPATIENT
Start: 2022-08-02 | End: 2022-12-25 | Stop reason: HOSPADM

## 2022-08-02 RX ORDER — PREDNISONE 1 MG/1
TABLET ORAL
Qty: 30 TABLET | Refills: 0 | Status: ON HOLD | OUTPATIENT
Start: 2022-08-02 | End: 2022-08-10

## 2022-08-02 RX ADMIN — METOPROLOL TARTRATE 12.5 MG: 25 TABLET, FILM COATED ORAL at 08:54

## 2022-08-02 RX ADMIN — Medication 10 ML: at 08:55

## 2022-08-02 RX ADMIN — AMLODIPINE BESYLATE 10 MG: 5 TABLET ORAL at 08:54

## 2022-08-02 RX ADMIN — FAMOTIDINE 10 MG: 20 TABLET ORAL at 08:54

## 2022-08-02 RX ADMIN — INSULIN LISPRO 5 UNITS: 100 INJECTION, SOLUTION INTRAVENOUS; SUBCUTANEOUS at 08:53

## 2022-08-02 RX ADMIN — SODIUM BICARBONATE 1300 MG: 650 TABLET ORAL at 08:54

## 2022-08-02 RX ADMIN — INSULIN LISPRO 8 UNITS: 100 INJECTION, SOLUTION INTRAVENOUS; SUBCUTANEOUS at 12:43

## 2022-08-02 RX ADMIN — ACETAMINOPHEN 650 MG: 325 TABLET, FILM COATED ORAL at 08:54

## 2022-08-02 RX ADMIN — TAMSULOSIN HYDROCHLORIDE 0.4 MG: 0.4 CAPSULE ORAL at 08:55

## 2022-08-02 RX ADMIN — LEVOTHYROXINE SODIUM 25 MCG: 0.03 TABLET ORAL at 08:55

## 2022-08-02 RX ADMIN — GABAPENTIN 400 MG: 400 CAPSULE ORAL at 08:54

## 2022-08-02 RX ADMIN — DEXAMETHASONE 6 MG: 4 TABLET ORAL at 08:54

## 2022-08-02 NOTE — PROGRESS NOTES
"Nutrition Services    Patient Name:  Helena Carmen  YOB: 1931  MRN: [id number]  Admit Date:  7/30/2022      PROGRESS NOTE    Comments: MASD on bilateral coccyx- Pt has MASD on coccyx. She is eating % of her meals and has a stable wt. Pt's meal intakes provide adequate nutrition for skin integrity.     Encounter Information         Reason for Encounter Pt has MASD on coccyx. She is eating % of her meals and has a stable wt. Pt's meal intakes provide adequate nutrition for skin integrity.        Current Nutrition Orders & Evaluation of Intake       Oral Nutrition     Current PO Diet Diet Regular; Consistent Carbohydrate   Supplement n/a   PO Evaluation     Trending % PO Intake %    --  Anthropometrics          Height    Weight Height: 144.8 cm (57\")  Weight: 76.2 kg (168 lb) (07/31/22 1556)    BMI kg/m2 Body mass index is 36.35 kg/m².    Weight trend stable     Labs        Pertinent Labs Reviewed, listed below     Results from last 7 days   Lab Units 08/02/22 0633 08/01/22  1017 07/31/22  0920   SODIUM mmol/L 136 136 134*   POTASSIUM mmol/L 4.1 4.2 4.2   CHLORIDE mmol/L 103 103 100   CO2 mmol/L 18.6* 16.0* 20.7*   BUN mg/dL 33* 28* 23   CREATININE mg/dL 1.24* 1.32* 1.29*   CALCIUM mg/dL 8.8 9.1 8.8   BILIRUBIN mg/dL 0.2 0.2 0.3   ALK PHOS U/L 60 65 73   ALT (SGPT) U/L 34* 31 41*   AST (SGOT) U/L 29 28 37*   GLUCOSE mg/dL 243* 351* 307*     Results from last 7 days   Lab Units 08/02/22  0633 07/31/22  0920 07/30/22  1034   MAGNESIUM mg/dL  --   --  1.8   HEMOGLOBIN g/dL 12.6   < > 12.6   HEMATOCRIT % 37.6   < > 37.8   WBC 10*3/mm3 4.52   < > 3.88    < > = values in this interval not displayed.     Results from last 7 days   Lab Units 08/02/22  0633 08/01/22  1017 07/31/22  0920 07/30/22  1034   INR   --   --   --  1.22*   PLATELETS 10*3/mm3 82* 74* 61* 46*     COVID19   Date Value Ref Range Status   07/30/2022 Detected (C) Not Detected - Ref. Range Final     Lab Results   Component " Value Date    HGBA1C 6.70 (H) 03/04/2022          Medications            Scheduled Medications amLODIPine, 10 mg, Oral, Daily  dexamethasone, 6 mg, Oral, Daily With Breakfast  famotidine, 10 mg, Oral, BID  gabapentin, 400 mg, Oral, BID  insulin glargine, 15 Units, Subcutaneous, Nightly  insulin lispro, 0-14 Units, Subcutaneous, 4x Daily With Meals & Nightly  levothyroxine, 25 mcg, Oral, Daily  metoprolol tartrate, 12.5 mg, Oral, BID  montelukast, 10 mg, Oral, Nightly  sodium bicarbonate, 1,300 mg, Oral, BID  sodium chloride, 10 mL, Intravenous, Q12H  tamsulosin, 0.4 mg, Oral, Daily        Infusions      PRN Medications •  acetaminophen  •  benzocaine-menthol  •  dextrose  •  dextrose  •  glucagon (human recombinant)  •  melatonin  •  nitroglycerin  •  ondansetron **OR** ondansetron  •  [COMPLETED] Insert peripheral IV **AND** sodium chloride  •  sodium chloride     Physical Findings         Physical Appearance Alert, oriented, obese    --   Gastrointestinal Diarrhea, fecal incontinence, last BM: 8/1     Skin MASD- bilateral coccyx      Intervention Goal        Intervention Goal(s) Maintain nutrition status, Maintain intake and Maintain weight     Nutrition Intervention        RD Action Follow Tx Progress and Care plan reviewed     Nutrition Prescription         Diet Prescription Consistent carb   Supplement Prescription N/a    Prescription Ordered      Monitor/Evaluation        Monitor Per protocol     RD to follow up per protocol.  Electronically signed by:  EVAN CARTER RD  08/02/22 14:39 EDT

## 2022-08-02 NOTE — THERAPY TREATMENT NOTE
Patient Name: Helena Carmen  : 1931    MRN: [id number]                              Today's Date: 2022       Admit Date: 2022    Visit Dx:     ICD-10-CM ICD-9-CM   1. COVID-19  U07.1 079.89   2. Weakness  R53.1 780.79   3. Hyponatremia  E87.1 276.1   4. Thrombocytopenia (HCC)  D69.6 287.5   5. Elevated LFTs  R79.89 790.6     Patient Active Problem List   Diagnosis   • Aortic valve stenosis   • Fatigue   • MI (mitral incompetence)   • PVC (premature ventricular contraction)   • Legally blind   • Essential hypertension   • Hypertriglyceridemia   • Pulmonary hypertension (HCC)   • Paroxysmal atrial fibrillation (HCC)   • Anxiety   • Chronic kidney disease   • Chronic pain disorder   • Degeneration of lumbar intervertebral disc   • Type 2 diabetes mellitus with hyperglycemia (HCC)   • Esophageal reflux   • Gastroparesis   • Hiatal hernia   • Iatrogenic hypothyroidism   • Macular degeneration   • Malignant neoplasm of uterus (HCC)   • Osteoarthritis of knee   • Peripheral nerve disease   • Secondary hyperparathyroidism (HCC)   • Chronic heart failure with preserved ejection fraction (HCC)   • Other cirrhosis of liver (HCC)   • Hypothyroidism   • Nonrheumatic tricuspid valve regurgitation   • COVID-19   • Anemia, chronic disease   • Thrombocytopenia due to COVID-19 virus   • Hyponatremia   • Weakness generalized     Past Medical History:   Diagnosis Date   • Aortic valve stenosis     s/p tissue AVR   • Back pain    • CKD (chronic kidney disease)    • Colitis due to Clostridioides difficile 2022   • Diastolic dysfunction     Grade 2 per echocardiogram    • Diverticulosis    • Exertional shortness of breath    • Heart disease    • Hiatal hernia    • Hyperlipidemia    • Hypertension    • Hyperthyroidism    • Hypertriglyceridemia 2018   • Hypothyroidism    • Left ventricular hypertrophy    • Legally blind    • Liver disease    • Macular degeneration    • Mitral regurgitation    •  Osteoarthritis of hip    • Pancreatitis 01/26/2022   • Paroxysmal atrial fibrillation (HCC)    • Premature ventricular contractions    • Pulmonary hypertension (HCC)    • Renal insufficiency syndrome    • Type 2 diabetes mellitus (HCC)    • Uterine cancer (HCC)      Past Surgical History:   Procedure Laterality Date   • AORTIC VALVE REPAIR/REPLACEMENT     • CATARACT EXTRACTION      1970, 1999   • ENDOSCOPY  08/15/2014    no gross lesions in stomach/duodenum, erythrematous mucosa in stomach   • HYSTERECTOMY  2007   • STERNOTOMY        General Information     Row Name 08/02/22 1403          Physical Therapy Time and Intention    Document Type therapy note (daily note)  -PC     Mode of Treatment physical therapy  -PC     Row Name 08/02/22 1403          General Information    Existing Precautions/Restrictions fall  -PC     Row Name 08/02/22 1403          Cognition    Orientation Status (Cognition) oriented x 3  -PC           User Key  (r) = Recorded By, (t) = Taken By, (c) = Cosigned By    Initials Name Provider Type    PC Lucina Downs, PT Physical Therapist               Mobility     Row Name 08/02/22 1403          Bed Mobility    Supine-Sit [place] (Bed Mobility) supervision  -PC     Sit-Supine [place] (Bed Mobility) supervision  -PC     Assistive Device (Bed Mobility) bed rails;head of bed elevated  -     Row Name 08/02/22 1403          Sit-Stand Transfer    Sit-Stand [place] (Transfers) contact guard;supervision  -PC     Assistive Device (Sit-Stand Transfers) walker, front-wheeled  -PC     Row Name 08/02/22 1403          Gait/Stairs (Locomotion)    [place] Level (Gait) minimum assist (75% patient effort);contact guard  -PC     Assistive Device (Gait) walker, front-wheeled  -PC     Distance in Feet (Gait) 20 ft x 2 with seated rest, pt SOA after activity but O2 sats >90%  -PC     Bilateral Gait Deviations forward flexed posture;heel strike decreased  -PC           User Key  (r) = Recorded By,  (t) = Taken By, (c) = Cosigned By    Initials Name Provider Type    PC Lucina Downs, PT Physical Therapist               Obj/Interventions     Row Name 08/02/22 1405          Motor Skills    Therapeutic Exercise --  seated AP, LAQ, hip flex, shoulder rolls, and shoudler flexion with deep breathing  -PC     Row Name 08/02/22 1405          Balance    Dynamic Sitting Balance standby assist  -PC     Position, Sitting Balance sitting edge of bed  -PC     Static Standing Balance supervision  -PC     Dynamic Standing Balance minimal assist  -PC     Position/Device Used, Standing Balance walker, rolling  -PC           User Key  (r) = Recorded By, (t) = Taken By, (c) = Cosigned By    Initials Name Provider Type    PC Lucina Downs, PT Physical Therapist               Goals/Plan    No documentation.                Clinical Impression     Row Name 08/02/22 1406          Pain    Pretreatment Pain Rating 0/10 - no pain  -PC     Row Name 08/02/22 1406          Plan of Care Review    Plan of Care Reviewed With patient  -PC     Progress improving  -PC     Outcome Evaluation pt with overall improvement with mobility and activity tolerance, she was able to take 2 short walks(20ft) with min/CGA with rwx. she got in and out of bed with supervision, at current level pt would be appropriate to go home with assist, pt assures me that either her daughter or her granddaughter will be with her. Pt may possibly go home today  -PC     Row Name 08/02/22 1406          Vital Signs    Pre SpO2 (%) 97  -PC     O2 Delivery Pre Treatment room air  -PC     Post SpO2 (%) 99  -PC     O2 Delivery Post Treatment room air  -PC     Row Name 08/02/22 1406          Positioning and Restraints    Pre-Treatment Position in bed  -PC     Post Treatment Position bed  -PC     In Bed supine;call light within reach;encouraged to call for assist;exit alarm on  -PC           User Key  (r) = Recorded By, (t) = Taken By, (c) = Cosigned By    Initials Name Provider  Type    PC Lucina Downs, PT Physical Therapist               Outcome Measures     Row Name 08/02/22 1409          How much help from another person do you currently need...    Turning from your back to your side while in flat bed without using bedrails? 4  -PC     Moving from lying on back to sitting on the side of a flat bed without bedrails? 4  -PC     Moving to and from a bed to a chair (including a wheelchair)? 4  -PC     Standing up from a chair using your arms (e.g., wheelchair, bedside chair)? 3  -PC     Climbing 3-5 steps with a railing? 3  -PC     To walk in hospital room? 3  -PC     AM-PAC 6 Clicks Score (PT) 21  -PC     Highest level of mobility 6 --> Walked 10 steps or more  -PC           User Key  (r) = Recorded By, (t) = Taken By, (c) = Cosigned By    Initials Name Provider Type    PC Lucina Downs, PT Physical Therapist                             Physical Therapy Education                 Title: PT OT SLP Therapies (In Progress)     Topic: Physical Therapy (Done)     Point: Mobility training (Done)     Learning Progress Summary           Patient Acceptance, E,D, DU by PC at 8/2/2022 1409    Acceptance, E, VU,NR by DJ at 7/31/2022 1458   Family Acceptance, E, VU,NR by DJ at 7/31/2022 1458                   Point: Home exercise program (Done)     Learning Progress Summary           Patient Acceptance, E,D, DU by PC at 8/2/2022 1409    Acceptance, E, VU,NR by DJ at 7/31/2022 1458   Family Acceptance, E, VU,NR by DJ at 7/31/2022 1458                   Point: Body mechanics (Done)     Learning Progress Summary           Patient Acceptance, E,D, DU by PC at 8/2/2022 1409    Acceptance, E, VU,NR by DJ at 7/31/2022 1458   Family Acceptance, E, VU,NR by DJ at 7/31/2022 1458                   Point: Precautions (Done)     Learning Progress Summary           Patient Acceptance, E,D, DU by PC at 8/2/2022 1409    Acceptance, E, VU,NR by DJ at 7/31/2022 1458   Family Acceptance, E, VU,NR by DJ at 7/31/2022  1458                               User Key     Initials Effective Dates Name Provider Type Discipline    PC 06/16/21 -  Lucina Downs PT Physical Therapist PT    DJ 10/25/19 -  Sherrie Anne PT Physical Therapist PT              PT Recommendation and Plan     Plan of Care Reviewed With: patient  Progress: improving  Outcome Evaluation: pt with overall improvement with mobility and activity tolerance, she was able to take 2 short walks(20ft) with min/CGA with rwx. she got in and out of bed with supervision, at current level pt would be appropriate to go home with assist, pt assures me that either her daughter or her granddaughter will be with her. Pt may possibly go home today     Time Calculation:    PT Charges     Row Name 08/02/22 1409             Time Calculation    Start Time 1326  -PC      Stop Time 1350  -PC      Time Calculation (min) 24 min  -PC      PT Received On 08/02/22  -PC      PT - Next Appointment 08/04/22  -PC            User Key  (r) = Recorded By, (t) = Taken By, (c) = Cosigned By    Initials Name Provider Type    PC Lucina Downs, PT Physical Therapist              Therapy Charges for Today     Code Description Service Date Service Provider Modifiers Qty    76993923625 HC PT THER PROC EA 15 MIN 8/2/2022 Lucina Downs, PT GP 2          PT G-Codes  Outcome Measure Options: AM-PAC 6 Clicks Basic Mobility (PT)  AM-PAC 6 Clicks Score (PT): 21    Lucina Downs PT  8/2/2022

## 2022-08-02 NOTE — PLAN OF CARE
Goal Outcome Evaluation:  Plan of Care Reviewed With: patient        Progress: improving  Outcome Evaluation: pt with overall improvement with mobility and activity tolerance, she was able to take 2 short walks(20ft) with min/CGA with rwx. she got in and out of bed with supervision, at current level pt would be appropriate to go home with assist, pt assures me that either her daughter or her granddaughter will be with her. Pt may possibly go home today

## 2022-08-02 NOTE — PLAN OF CARE
Goal Outcome Evaluation:  Plan of Care Reviewed With: patient, daughter        Progress: improving  Outcome Evaluation: Patient discharged to home with daughter.

## 2022-08-02 NOTE — DISCHARGE SUMMARY
Patient Name: Helena Carmen  : 1931  MRN: [id number]    Date of Admission: 2022  Date of Discharge:  2022  Primary Care Physician: Mariah Hickman MD      Chief Complaint:   Weakness - Generalized      Discharge Diagnoses     Active Hospital Problems    Diagnosis  POA   • **COVID-19 [U07.1]  Yes   • Cytokine release syndrome, grade 1 [D89.831]  No   • Urine retention [R33.9]  Yes   • Anemia, chronic disease [D63.8]  Unknown   • Weakness generalized [R53.1]  Unknown   • Other cirrhosis of liver (HCC) [K74.69]  Yes   • Chronic heart failure with preserved ejection fraction (HCC) [I50.32]  Yes   • Hypothyroidism [E03.9]  Yes   • Malignant neoplasm of uterus (HCC) [C55]  Yes   • Type 2 diabetes mellitus with hyperglycemia (HCC) [E11.65]  Yes   • Pulmonary hypertension (HCC) [I27.20]  Yes   • Paroxysmal atrial fibrillation (HCC) [I48.0]  Yes   • Legally blind [H54.8]  Yes   • Aortic valve stenosis [I35.0]  Yes   • Chronic kidney disease [N18.9]  Yes   • Thrombocytopenia (HCC) [D69.6]  Yes      Resolved Hospital Problems    Diagnosis Date Resolved POA   • Leukopenia [D72.819] 2022 Yes   • Hyponatremia [E87.1] 2022 Unknown        Hospital Course     Ms. Carmen is a 89 y.o. female with a history of porcine AVR, Pino cirrhosis, DM2, CKD and chronic vision loss among other problems generalized weakness.  Who presented to [facility] initially complaining of and fatigue.  Please see the admitting history and physical for further details.  She was noted to be positive for COVID..  Patient is fully vaccinated.  She did have some diarrhea after admission but generally deals with constipation chronically.  She also has chronic urinary retention and was followed by urology.  Recently had a Hoffman catheter which was removed a couple weeks ago.  She did have some problems with urine retention after admission and was seen by urology here.  They recommended intermittent  catheterization which had to be done a couple times.  Cardiology saw her due to a mild bump in her troponin initially and did perform echocardiogram which did not show anything of note.  They do not feel this was acute coronary syndrome and will follow her up outpatient.  Valve appears to be functioning normally.      From COVID standpoint she was very mildly hypoxic initially and was started on dexamethasone.  She was not started on remdesivir primarily because it was difficult to ascertain how long her symptoms have been going on and since she was really not extremely hypoxic and had the liver issues we felt the risks outweigh the benefits.  She did very well with the dexamethasone and actually was not hypoxic after the first day or so she was here.  She did develop some leukopenia briefly but this also responded back to normal.  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  She is on chronic every other day prednisone dosing but as I said that was held for dexamethasone here.  I have told her to go back to daily prednisone for the next couple days and then go back to her usual home dose of every other day after that.  Weakness has improved a bit, PT is been seeing and feels she is safe for discharge home with family.      Day of Discharge     Subjective:  Doing pretty well.  Nonproductive cough at times    Physical Exam:  Temp:  [96.5 °F (35.8 °C)-97.3 °F (36.3 °C)] 97.3 °F (36.3 °C)  Heart Rate:  [53-65] 53  Resp:  [20] 20  BP: (158-167)/(64-86) 166/64  Body mass index is 36.35 kg/m².  Physical Exam  Vitals and nursing note reviewed.   Constitutional:       General: She is not in acute distress.     Appearance: She is ill-appearing. She is not toxic-appearing.   HENT:      Head: Normocephalic and atraumatic.      Mouth/Throat:      Mouth: Mucous membranes are moist.   Eyes:      General: No scleral icterus.  Neck:      Vascular: No JVD.   Cardiovascular:      Rate and Rhythm: Normal rate  and regular rhythm.      Pulses: Normal pulses.      Heart sounds: Normal heart sounds. No murmur heard.  Pulmonary:      Effort: Pulmonary effort is normal. No respiratory distress.      Breath sounds: Normal breath sounds.   Abdominal:      General: Bowel sounds are normal. There is no distension.      Palpations: Abdomen is soft.      Tenderness: There is no abdominal tenderness.   Musculoskeletal:         General: No swelling or tenderness.      Cervical back: Neck supple.   Skin:     General: Skin is warm and dry.      Coloration: Skin is not jaundiced.      Findings: No rash.   Neurological:      Mental Status: She is alert and oriented to person, place, and time.   Psychiatric:         Mood and Affect: Mood normal.         Behavior: Behavior normal.         Consultants     Consult Orders (all) (From admission, onward)     Start     Ordered    08/01/22 1149  Inpatient Urology Consult  Once        Specialty:  Urology  Provider:  Pablo Campos Jr., MD    08/01/22 1151    07/30/22 1449  Inpatient Cardiology Consult  Once        Specialty:  Cardiology  Provider:  Beth Butcher MD    07/30/22 1451    07/30/22 1241  [facility] (on-call MD unless specified) Details  Once        Specialty:  Hospitalist  Provider:  (Not yet assigned)    07/30/22 1240              Procedures         Imaging Results (All)     Procedure Component Value Units Date/Time    XR Chest 1 View [[id number]] Collected: 07/30/22 1021     Updated: 07/30/22 1025    Narrative:      XR CHEST 1 VW-     HISTORY: Female who is 89 years-old,  short of breath     TECHNIQUE: Frontal view of the chest     COMPARISON: 06/05/2022     FINDINGS: The heart size is normal. Sternotomy wires are present. Aorta  is calcified. Pulmonary vasculature is unremarkable. No focal pulmonary  consolidation, pleural effusion, or pneumothorax. No acute osseous  process.       Impression:      No evidence for acute pulmonary process. Follow-up as  clinical indications  persist.     This report was finalized on 7/30/2022 10:22 AM by Dr. Donald Hernandez M.D.             Results for orders placed during the hospital encounter of 07/30/22    Adult Transthoracic Echo Complete W/ Cont if Necessary Per Protocol    Interpretation Summary  · Left ventricular ejection fraction appears to be 61 - 65%. Left ventricular systolic function is normal.  · Left ventricular wall thickness is consistent with mild concentric hypertrophy.  · Left ventricular diastolic function is consistent with (grade II w/high LAP) pseudonormalization.  · Normal right ventricular cavity size and systolic function noted.  · The left atrial cavity is moderately dilated.  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 The aortic valve peak and mean gradients are within defined limits. The prosthetic aortic valve is grossly normal.  · There is severe mitral annular calcification. The mitral valve leaflets are thickened. There are several calcified chordae  · Mild mitral valve regurgitation is present. No significant mitral valve stenosis is present.  · Mild to moderate tricuspid valve regurgitation is present.  · Calculated right ventricular systolic pressure from tricuspid regurgitation is 37 mmHg.  · There is no evidence of pericardial effusion    Pertinent Labs     Results from last 7 days   Lab Units 08/02/22  0633 08/01/22  1017 07/31/22  0920 07/30/22  1034   WBC 10*3/mm3 4.52 3.79 1.71* 3.88   HEMOGLOBIN g/dL 12.6 12.7 13.8 12.6   PLATELETS 10*3/mm3 82* 74* 61* 46*     Results from last 7 days   Lab Units 08/02/22  0633 08/01/22  1017 07/31/22  0920 07/30/22  1034   SODIUM mmol/L 136 136 134* 128*   POTASSIUM mmol/L 4.1 4.2 4.2 4.3   CHLORIDE mmol/L 103 103 100 96*   CO2 mmol/L 18.6* 16.0* 20.7* 18.0*   BUN mg/dL 33* 28* 23 19   CREATININE mg/dL 1.24* 1.32* 1.29* 1.45*   GLUCOSE mg/dL 243* 351* 307* 155*   EGFR mL/min/1.73 41.2* 38.2* 39.3* 34.1*     Results from last 7 days   Lab Units 08/02/22 0633  08/01/22 1017 07/31/22  0920 07/30/22  1034   ALBUMIN g/dL 3.40* 3.40* 3.80 3.80   BILIRUBIN mg/dL 0.2 0.2 0.3 0.4   ALK PHOS U/L 60 65 73 73   AST (SGOT) U/L 29 28 37* 46*   ALT (SGPT) U/L 34* 31 41* 40*     Results from last 7 days   Lab Units 08/02/22  0633 08/01/22 1017 07/31/22 0920 07/30/22  1034   CALCIUM mg/dL 8.8 9.1 8.8 9.2   ALBUMIN g/dL 3.40* 3.40* 3.80 3.80   MAGNESIUM mg/dL  --   --   --  1.8       Results from last 7 days   Lab Units 08/01/22 1017 07/31/22 0920 07/31/22 0919 07/30/22  1034   TROPONIN T ng/mL <0.010 0.017  --  0.037*   PROBNP pg/mL  --   --   --  1,529.0   D DIMER QUANT MCGFEU/mL 2.11*  --  3.09*  --            Invalid input(s): LDLCALC      Results from last 7 days   Lab Units 07/30/22  1034   COVID19  Detected*       Test Results Pending at Discharge       Discharge Details        Discharge Medications      New Medications      Instructions Start Date   sodium bicarbonate 650 MG tablet   650 mg, Oral, 2 Times Daily         Changes to Medications      Instructions Start Date   predniSONE 5 MG tablet  Commonly known as: DELTASONE  What changed: See the new instructions.   Take 2 tablets by mouth Daily for 3 days, THEN 2 tablets Every Other Day for 3 days.   Start Date: August 2, 2022        Continue These Medications      Instructions Start Date   acetaminophen 325 MG tablet  Commonly known as: TYLENOL   650 mg, Oral, Every 6 Hours PRN      amLODIPine 10 MG tablet  Commonly known as: NORVASC   1 tablet, Oral, Daily      famotidine 10 MG tablet  Commonly known as: PEPCID   10 mg, Oral, 2 Times Daily      furosemide 20 MG tablet  Commonly known as: LASIX   20 mg, Oral, Daily      gabapentin 400 MG capsule  Commonly known as: NEURONTIN   400 mg, Oral, 2 Times Daily      insulin lispro 100 UNIT/ML injection  Commonly known as: humaLOG   0-10 Units, Subcutaneous, 3 Times Daily Before Meals, 2 units for every 50 > 150      levothyroxine 25 MCG tablet  Commonly known as: SYNTHROID,  LEVOTHROID   1 tablet, Oral, Daily      LORazepam 0.5 MG tablet  Commonly known as: ATIVAN   No dose, route, or frequency recorded.      metoprolol tartrate 25 MG tablet  Commonly known as: LOPRESSOR   12.5 mg, Oral, 2 Times Daily      montelukast 10 MG tablet  Commonly known as: SINGULAIR   1 tablet, Oral, Nightly      polyethylene glycol 17 GM/SCOOP powder  Commonly known as: MIRALAX   17 g, Oral, Daily      rosuvastatin 5 MG tablet  Commonly known as: CRESTOR   1 tablet, Oral, Daily      tamsulosin 0.4 MG capsule 24 hr capsule  Commonly known as: FLOMAX   0.4 mg, Oral, Every Night at Bedtime         Stop These Medications    bebtelovimab 175 MG/2ML injection            Allergies   Allergen Reactions   • Erythromycin Unknown (See Comments) and Other (See Comments)     Pt states she does not remember but it was many years ago  Pt states she does not remember but it was many years ago   • Cephalexin Other (See Comments) and Unknown (See Comments)     June 2022 Pt has tolerated ceftriaxone and cefepime during admission.   Pt states she does not remember reaction but it was many years ago   • Penicillins Rash   • Sulfa Antibiotics Itching and Rash       Discharge Disposition:  Home or Self Care      Discharge Diet:  Diet Order   Procedures   • Diet Regular; Consistent Carbohydrate       Discharge Activity:       CODE STATUS:    Code Status and Medical Interventions:   Ordered at: 07/30/22 1956     Medical Intervention Limits:    NO intubation (DNI)    NO cardioversion     Level Of Support Discussed With:    Patient     Code Status (Patient has no pulse and is not breathing):    No CPR (Do Not Attempt to Resuscitate)     Medical Interventions (Patient has pulse or is breathing):    Limited Support       Future Appointments   Date Time Provider Department Center   8/5/2022  1:20 PM LAB CHAIR 5 [facility]  [facility]   8/5/2022  2:00 PM Althea Macedo APRN [facility] [place]   8/19/2022  2:00 PM LAB CHAIR 5 [facility]  [facility]   8/19/2022  2:40 PM Althea Macedo APRN [facility] [place]   8/22/2022  2:00 PM [facility]   9/2/2022  2:50 PM LAB CHAIR 3 [facility]   9/2/2022  3:20 PM Pablo Sherman Jr., MD [facility]   9/27/2022  1:40 PM Beth Butcher MD [facility]     Additional Instructions for the Follow-ups that You Need to Schedule     Discharge Follow-up with PCP   As directed       Currently Documented PCP:    Mariah Hickman MD    PCP Phone Number:    [phone]     Follow Up Details: 1-2 weeks with BMP            Contact information for follow-up providers     Mariah Hickman MD .    Specialty: Family Medicine  Why: 1-2 weeks with BMP  Contact information:  [address]  [facility] [id number] [phone]                   Contact information for after-discharge care     Home Medical Care     [facility]    Service: Home Health Services  Contact information:  [address]  [place] [facility] [address] [phone]                             Additional Instructions for the Follow-ups that You Need to Schedule     Discharge Follow-up with PCP   As directed       Currently Documented PCP:    Mariah Hickman MD    PCP Phone Number:    [phone]     Follow Up Details: 1-2 weeks with BMP           Time Spent on Discharge:  Greater than 30 minutes      Alfredito Cee MD  [facility]  08/02/22  15:50 EDT

## 2022-08-03 ENCOUNTER — READMISSION MANAGEMENT (OUTPATIENT)
Dept: CALL CENTER | Facility: HOSPITAL | Age: 87
End: 2022-08-03

## 2022-08-03 NOTE — CASE MANAGEMENT/SOCIAL WORK
Case Management Discharge Note      Final Note: Patient discharged home with family and [facility] via private transportation. Juan GARAY         Selected Continued Care - Discharged on 8/2/2022 Admission date: 7/30/2022 - Discharge disposition: Home or Self Care    Destination    No services have been selected for the patient.              Durable Medical Equipment    No services have been selected for the patient.              Dialysis/Infusion    No services have been selected for the patient.              Home Medical Care Coordination complete.    Service Provider Selected Services Address Phone Fax Patient Preferred    [address],[facility] [address], [address], [facility] [address] [phone] --          Therapy    No services have been selected for the patient.              Community Resources    No services have been selected for the patient.              Community & DME    No services have been selected for the patient.                Selected Continued Care - Prior Encounters Includes selections from prior encounters from 5/1/2022 to 8/2/2022    Discharged on 6/9/2022 Admission date: 6/5/2022 - Discharge disposition: Skilled Nursing Facility (DC - External)    Destination     Service Provider Selected Services Address Phone Fax Patient Preferred    [facility]  [facility] [address] , [facility] [address] [phone] --                    Transportation Services  Private: Car    Final Discharge Disposition Code: 06 - home with home health care

## 2022-08-03 NOTE — OUTREACH NOTE
COVID-19 Week 1 Survey    Flowsheet Row Responses   [facility] hospitals in [place] patient discharged from? [facility]   Does the patient have one of the following disease processes/diagnoses(primary or secondary)? COVID-19   COVID-19 underlying condition? CHF   Call Number Call 1   Week 1 Call successful? Yes   Call start time 1215   Call end time 1224   Discharge diagnosis Covid 19   Person spoke with today (if not patient) and relationship daughter Radha Mcgregor reviewed with patient/caregiver? Yes   Is the patient having any side effects they believe may be caused by any medication additions or changes? No   Does the patient have all medications ordered at discharge? Yes   Is the patient taking all medications as directed (includes completed medication regime)? Yes   Does the patient have a primary care provider?  Yes   Comments regarding PCP Has a followup with hematology on Aug 5   Does the patient have an appointment with their PCP or specialist within 7 days of discharge? Yes   Has the patient kept scheduled appointments due by today? N/A   What [facility] the [facility] agency?  [facility]    Has home health visited the patient within 72 hours of discharge? Yes   Psychosocial issues? No   Did the patient receive a copy of their discharge instructions? Yes   Did the patient receive a copy of COVID-19 specific instructions? Yes   Nursing interventions Reviewed instructions with patient   What is the patient's perception of their health status since discharge? Improving   Does the patient have any of the following symptoms? Cough  [very weak]   Nursing Interventions Nurse provided patient education   Pulse Ox monitoring None   Is the patient/caregiver able to teach back steps to recovery at home? Set small, achievable goals for return to baseline health, Rest and rebuild strength, gradually increase activity, Make a list of questions for provider's appointment   If the patient is a current smoker, are they able to teach back  resources for cessation? Not a smoker   Is the patient/caregiver able to teach back the hierarchy of who to call/visit for symptoms/problems? PCP, Specialist, Home health nurse, Urgent Care, ED, 911 Yes   Is the patient able to teach back Heart Failure diet management? Yes   Is the patient able to teach back Heart Failure Zones? Yes   Is the patient able to teach back signs and symptoms of worsening condition? (i.e. weight gain, shortness of air, etc.) Yes   COVID-19 call completed? Yes   Wrap up additional comments Per daughter patient is doing well. She is weak. They are very appreciate of care at [facility] - Registered Nurse

## 2022-08-05 ENCOUNTER — APPOINTMENT (OUTPATIENT)
Dept: GENERAL RADIOLOGY | Facility: HOSPITAL | Age: 87
End: 2022-08-05

## 2022-08-05 ENCOUNTER — READMISSION MANAGEMENT (OUTPATIENT)
Dept: CALL CENTER | Facility: HOSPITAL | Age: 87
End: 2022-08-05

## 2022-08-05 ENCOUNTER — APPOINTMENT (OUTPATIENT)
Dept: LAB | Facility: HOSPITAL | Age: 87
End: 2022-08-05

## 2022-08-05 ENCOUNTER — HOSPITAL ENCOUNTER (INPATIENT)
Facility: HOSPITAL | Age: 87
LOS: 4 days | Discharge: HOME HEALTH | End: 2022-08-10
Attending: EMERGENCY MEDICINE | Admitting: INTERNAL MEDICINE

## 2022-08-05 DIAGNOSIS — R53.1 GENERALIZED WEAKNESS: ICD-10-CM

## 2022-08-05 DIAGNOSIS — U07.1 COVID-19 VIRUS INFECTION: Primary | ICD-10-CM

## 2022-08-05 DIAGNOSIS — E16.2 HYPOGLYCEMIA: ICD-10-CM

## 2022-08-05 DIAGNOSIS — U07.1 COVID-19: ICD-10-CM

## 2022-08-05 DIAGNOSIS — I82.463 ACUTE DEEP VEIN THROMBOSIS (DVT) OF CALF MUSCLE VEIN OF BOTH LOWER EXTREMITIES: ICD-10-CM

## 2022-08-05 LAB
BASOPHILS # BLD AUTO: 0.01 10*3/MM3 (ref 0–0.2)
BASOPHILS NFR BLD AUTO: 0.1 % (ref 0–1.5)
DEPRECATED RDW RBC AUTO: 45.8 FL (ref 37–54)
EOSINOPHIL # BLD AUTO: 0.07 10*3/MM3 (ref 0–0.4)
EOSINOPHIL NFR BLD AUTO: 0.8 % (ref 0.3–6.2)
ERYTHROCYTE [DISTWIDTH] IN BLOOD BY AUTOMATED COUNT: 14.7 % (ref 12.3–15.4)
HCT VFR BLD AUTO: 41.7 % (ref 34–46.6)
HGB BLD-MCNC: 13.9 G/DL (ref 12–15.9)
LYMPHOCYTES # BLD AUTO: 2.14 10*3/MM3 (ref 0.7–3.1)
LYMPHOCYTES NFR BLD AUTO: 24.2 % (ref 19.6–45.3)
MCH RBC QN AUTO: 29 PG (ref 26.6–33)
MCHC RBC AUTO-ENTMCNC: 33.3 G/DL (ref 31.5–35.7)
MCV RBC AUTO: 87.1 FL (ref 79–97)
MONOCYTES # BLD AUTO: 0.82 10*3/MM3 (ref 0.1–0.9)
MONOCYTES NFR BLD AUTO: 9.3 % (ref 5–12)
NEUTROPHILS NFR BLD AUTO: 5.71 10*3/MM3 (ref 1.7–7)
NEUTROPHILS NFR BLD AUTO: 64.5 % (ref 42.7–76)
PLATELET # BLD AUTO: 98 10*3/MM3 (ref 140–450)
PMV BLD AUTO: 11.9 FL (ref 6–12)
RBC # BLD AUTO: 4.79 10*6/MM3 (ref 3.77–5.28)
WBC NRBC COR # BLD: 8.85 10*3/MM3 (ref 3.4–10.8)

## 2022-08-05 PROCEDURE — 71045 X-RAY EXAM CHEST 1 VIEW: CPT

## 2022-08-05 PROCEDURE — 85025 COMPLETE CBC W/AUTO DIFF WBC: CPT | Performed by: EMERGENCY MEDICINE

## 2022-08-05 PROCEDURE — 99284 EMERGENCY DEPT VISIT MOD MDM: CPT

## 2022-08-05 PROCEDURE — 83605 ASSAY OF LACTIC ACID: CPT | Performed by: EMERGENCY MEDICINE

## 2022-08-05 PROCEDURE — 80053 COMPREHEN METABOLIC PANEL: CPT | Performed by: EMERGENCY MEDICINE

## 2022-08-05 RX ORDER — SODIUM CHLORIDE 0.9 % (FLUSH) 0.9 %
10 SYRINGE (ML) INJECTION AS NEEDED
Status: DISCONTINUED | OUTPATIENT
Start: 2022-08-05 | End: 2022-08-10 | Stop reason: HOSPADM

## 2022-08-05 NOTE — OUTREACH NOTE
COVID-19 Week 1 Survey    Flowsheet Row Responses   [facility] patient discharged from? [facility]   Does the patient have one of the following disease processes/diagnoses(primary or secondary)? COVID-19   COVID-19 underlying condition? CHF   Call Number Call 1   Week 1 Call successful? Yes   Call start time 1052   Call end time 1105   Discharge diagnosis Covid 19   Is patient permission given to speak with other caregiver? Yes   List who call center can speak with Donna Garcia    Person spoke with today (if not patient) and relationship Daughter Radha    Meds reviewed with patient/caregiver? Yes   Is the patient taking all medications as directed (includes completed medication regime)? Yes   Comments regarding appointments Rescheduled hematology apt d/t COVID    Has the patient kept scheduled appointments due by today? N/A   What is the [facility] health agency?  [facility]   Home health comments Visited patient 8/4/22   Psychosocial issues? No   Does the patient have any of the following symptoms? Cough   Pulse Ox monitoring Intermittent   Pulse Ox device source Patient   O2 Sat comments 98% RA    O2 Sat: education provided Sat levels, Monitoring frequency, When to seek care   Is the patient/caregiver able to teach back the hierarchy of who to call/visit for symptoms/problems? PCP, Specialist, Home health nurse, Urgent Care, ED, 911 Yes   COVID-19 call completed? Yes          HARITHA ALVAREZ - Registered Nurse

## 2022-08-06 ENCOUNTER — APPOINTMENT (OUTPATIENT)
Dept: CARDIOLOGY | Facility: HOSPITAL | Age: 87
End: 2022-08-06

## 2022-08-06 ENCOUNTER — READMISSION MANAGEMENT (OUTPATIENT)
Dept: CALL CENTER | Facility: HOSPITAL | Age: 87
End: 2022-08-06

## 2022-08-06 PROBLEM — U07.1 COVID-19 VIRUS INFECTION: Status: ACTIVE | Noted: 2022-08-06

## 2022-08-06 LAB
ALBUMIN SERPL-MCNC: 3.8 G/DL (ref 3.5–5.2)
ALBUMIN/GLOB SERPL: 1.6 G/DL
ALP SERPL-CCNC: 64 U/L (ref 39–117)
ALT SERPL W P-5'-P-CCNC: 63 U/L (ref 1–33)
ANION GAP SERPL CALCULATED.3IONS-SCNC: 14 MMOL/L (ref 5–15)
ANION GAP SERPL CALCULATED.3IONS-SCNC: 14.6 MMOL/L (ref 5–15)
AST SERPL-CCNC: 35 U/L (ref 1–32)
B PARAPERT DNA SPEC QL NAA+PROBE: NOT DETECTED
B PERT DNA SPEC QL NAA+PROBE: NOT DETECTED
BASOPHILS # BLD AUTO: 0.02 10*3/MM3 (ref 0–0.2)
BASOPHILS NFR BLD AUTO: 0.3 % (ref 0–1.5)
BH CV LOW VAS LEFT SOLEAL VESSEL: 1
BH CV LOW VAS RIGHT SOLEAL VESSEL: 1
BH CV LOWER VASCULAR LEFT COMMON FEMORAL AUGMENT: NORMAL
BH CV LOWER VASCULAR LEFT COMMON FEMORAL COMPETENT: NORMAL
BH CV LOWER VASCULAR LEFT COMMON FEMORAL COMPRESS: NORMAL
BH CV LOWER VASCULAR LEFT COMMON FEMORAL PHASIC: NORMAL
BH CV LOWER VASCULAR LEFT COMMON FEMORAL SPONT: NORMAL
BH CV LOWER VASCULAR LEFT DISTAL FEMORAL COMPRESS: NORMAL
BH CV LOWER VASCULAR LEFT GASTRONEMIUS COMPRESS: NORMAL
BH CV LOWER VASCULAR LEFT GREATER SAPH AK COMPRESS: NORMAL
BH CV LOWER VASCULAR LEFT GREATER SAPH BK COMPRESS: NORMAL
BH CV LOWER VASCULAR LEFT LESSER SAPH COMPRESS: NORMAL
BH CV LOWER VASCULAR LEFT MID FEMORAL AUGMENT: NORMAL
BH CV LOWER VASCULAR LEFT MID FEMORAL COMPETENT: NORMAL
BH CV LOWER VASCULAR LEFT MID FEMORAL COMPRESS: NORMAL
BH CV LOWER VASCULAR LEFT MID FEMORAL PHASIC: NORMAL
BH CV LOWER VASCULAR LEFT MID FEMORAL SPONT: NORMAL
BH CV LOWER VASCULAR LEFT PERONEAL COMPRESS: NORMAL
BH CV LOWER VASCULAR LEFT POPLITEAL AUGMENT: NORMAL
BH CV LOWER VASCULAR LEFT POPLITEAL COMPETENT: NORMAL
BH CV LOWER VASCULAR LEFT POPLITEAL COMPRESS: NORMAL
BH CV LOWER VASCULAR LEFT POPLITEAL PHASIC: NORMAL
BH CV LOWER VASCULAR LEFT POPLITEAL SPONT: NORMAL
BH CV LOWER VASCULAR LEFT POSTERIOR TIBIAL COMPRESS: NORMAL
BH CV LOWER VASCULAR LEFT PROFUNDA FEMORAL COMPRESS: NORMAL
BH CV LOWER VASCULAR LEFT PROXIMAL FEMORAL COMPRESS: NORMAL
BH CV LOWER VASCULAR LEFT SAPHENOFEMORAL JUNCTION COMPRESS: NORMAL
BH CV LOWER VASCULAR LEFT SOLEAL COMPRESS: NORMAL
BH CV LOWER VASCULAR LEFT SOLEAL THROMBUS: NORMAL
BH CV LOWER VASCULAR RIGHT COMMON FEMORAL AUGMENT: NORMAL
BH CV LOWER VASCULAR RIGHT COMMON FEMORAL COMPETENT: NORMAL
BH CV LOWER VASCULAR RIGHT COMMON FEMORAL COMPRESS: NORMAL
BH CV LOWER VASCULAR RIGHT COMMON FEMORAL PHASIC: NORMAL
BH CV LOWER VASCULAR RIGHT COMMON FEMORAL SPONT: NORMAL
BH CV LOWER VASCULAR RIGHT DISTAL FEMORAL COMPRESS: NORMAL
BH CV LOWER VASCULAR RIGHT GASTRONEMIUS COMPRESS: NORMAL
BH CV LOWER VASCULAR RIGHT GREATER SAPH AK COMPRESS: NORMAL
BH CV LOWER VASCULAR RIGHT GREATER SAPH BK COMPRESS: NORMAL
BH CV LOWER VASCULAR RIGHT LESSER SAPH COMPRESS: NORMAL
BH CV LOWER VASCULAR RIGHT MID FEMORAL AUGMENT: NORMAL
BH CV LOWER VASCULAR RIGHT MID FEMORAL COMPETENT: NORMAL
BH CV LOWER VASCULAR RIGHT MID FEMORAL COMPRESS: NORMAL
BH CV LOWER VASCULAR RIGHT MID FEMORAL PHASIC: NORMAL
BH CV LOWER VASCULAR RIGHT MID FEMORAL SPONT: NORMAL
BH CV LOWER VASCULAR RIGHT PERONEAL COMPRESS: NORMAL
BH CV LOWER VASCULAR RIGHT POPLITEAL AUGMENT: NORMAL
BH CV LOWER VASCULAR RIGHT POPLITEAL COMPETENT: NORMAL
BH CV LOWER VASCULAR RIGHT POPLITEAL COMPRESS: NORMAL
BH CV LOWER VASCULAR RIGHT POPLITEAL PHASIC: NORMAL
BH CV LOWER VASCULAR RIGHT POPLITEAL SPONT: NORMAL
BH CV LOWER VASCULAR RIGHT POSTERIOR TIBIAL COMPRESS: NORMAL
BH CV LOWER VASCULAR RIGHT PROFUNDA FEMORAL COMPRESS: NORMAL
BH CV LOWER VASCULAR RIGHT PROXIMAL FEMORAL COMPRESS: NORMAL
BH CV LOWER VASCULAR RIGHT SAPHENOFEMORAL JUNCTION COMPRESS: NORMAL
BH CV LOWER VASCULAR RIGHT SOLEAL COMPRESS: NORMAL
BH CV LOWER VASCULAR RIGHT SOLEAL THROMBUS: NORMAL
BILIRUB SERPL-MCNC: 0.4 MG/DL (ref 0–1.2)
BUN SERPL-MCNC: 27 MG/DL (ref 8–23)
BUN SERPL-MCNC: 28 MG/DL (ref 8–23)
BUN/CREAT SERPL: 19.4 (ref 7–25)
BUN/CREAT SERPL: 19.7 (ref 7–25)
C PNEUM DNA NPH QL NAA+NON-PROBE: NOT DETECTED
CALCIUM SPEC-SCNC: 8.7 MG/DL (ref 8.2–9.6)
CALCIUM SPEC-SCNC: 8.8 MG/DL (ref 8.2–9.6)
CHLORIDE SERPL-SCNC: 100 MMOL/L (ref 98–107)
CHLORIDE SERPL-SCNC: 103 MMOL/L (ref 98–107)
CO2 SERPL-SCNC: 23 MMOL/L (ref 22–29)
CO2 SERPL-SCNC: 23.4 MMOL/L (ref 22–29)
CREAT SERPL-MCNC: 1.39 MG/DL (ref 0.57–1)
CREAT SERPL-MCNC: 1.42 MG/DL (ref 0.57–1)
CRP SERPL-MCNC: 1.02 MG/DL (ref 0–0.5)
D DIMER PPP FEU-MCNC: 2.11 MCGFEU/ML (ref 0–0.49)
D-LACTATE SERPL-SCNC: 1.8 MMOL/L (ref 0.5–2)
DEPRECATED RDW RBC AUTO: 46.2 FL (ref 37–54)
EGFRCR SERPLBLD CKD-EPI 2021: 35 ML/MIN/1.73
EGFRCR SERPLBLD CKD-EPI 2021: 35.9 ML/MIN/1.73
EOSINOPHIL # BLD AUTO: 0.07 10*3/MM3 (ref 0–0.4)
EOSINOPHIL NFR BLD AUTO: 0.9 % (ref 0.3–6.2)
ERYTHROCYTE [DISTWIDTH] IN BLOOD BY AUTOMATED COUNT: 14.6 % (ref 12.3–15.4)
FERRITIN SERPL-MCNC: 395 NG/ML (ref 13–150)
FLUAV SUBTYP SPEC NAA+PROBE: NOT DETECTED
FLUBV RNA ISLT QL NAA+PROBE: NOT DETECTED
GLOBULIN UR ELPH-MCNC: 2.4 GM/DL
GLUCOSE BLDC GLUCOMTR-MCNC: 106 MG/DL (ref 70–130)
GLUCOSE BLDC GLUCOMTR-MCNC: 110 MG/DL (ref 70–130)
GLUCOSE BLDC GLUCOMTR-MCNC: 302 MG/DL (ref 70–130)
GLUCOSE BLDC GLUCOMTR-MCNC: 316 MG/DL (ref 70–130)
GLUCOSE BLDC GLUCOMTR-MCNC: 375 MG/DL (ref 70–130)
GLUCOSE SERPL-MCNC: 64 MG/DL (ref 65–99)
GLUCOSE SERPL-MCNC: 93 MG/DL (ref 65–99)
HADV DNA SPEC NAA+PROBE: NOT DETECTED
HCOV 229E RNA SPEC QL NAA+PROBE: NOT DETECTED
HCOV HKU1 RNA SPEC QL NAA+PROBE: NOT DETECTED
HCOV NL63 RNA SPEC QL NAA+PROBE: NOT DETECTED
HCOV OC43 RNA SPEC QL NAA+PROBE: NOT DETECTED
HCT VFR BLD AUTO: 44 % (ref 34–46.6)
HGB BLD-MCNC: 14.2 G/DL (ref 12–15.9)
HMPV RNA NPH QL NAA+NON-PROBE: NOT DETECTED
HPIV1 RNA ISLT QL NAA+PROBE: NOT DETECTED
HPIV2 RNA SPEC QL NAA+PROBE: NOT DETECTED
HPIV3 RNA NPH QL NAA+PROBE: NOT DETECTED
HPIV4 P GENE NPH QL NAA+PROBE: NOT DETECTED
INR PPP: 1.13 (ref 0.9–1.1)
LYMPHOCYTES # BLD AUTO: 2.37 10*3/MM3 (ref 0.7–3.1)
LYMPHOCYTES NFR BLD AUTO: 30.5 % (ref 19.6–45.3)
M PNEUMO IGG SER IA-ACNC: NOT DETECTED
MAGNESIUM SERPL-MCNC: 2 MG/DL (ref 1.7–2.3)
MAXIMAL PREDICTED HEART RATE: 129 BPM
MCH RBC QN AUTO: 28.2 PG (ref 26.6–33)
MCHC RBC AUTO-ENTMCNC: 32.3 G/DL (ref 31.5–35.7)
MCV RBC AUTO: 87.3 FL (ref 79–97)
MONOCYTES # BLD AUTO: 0.74 10*3/MM3 (ref 0.1–0.9)
MONOCYTES NFR BLD AUTO: 9.5 % (ref 5–12)
NEUTROPHILS NFR BLD AUTO: 4.49 10*3/MM3 (ref 1.7–7)
NEUTROPHILS NFR BLD AUTO: 57.6 % (ref 42.7–76)
PLATELET # BLD AUTO: 89 10*3/MM3 (ref 140–450)
PMV BLD AUTO: 11.5 FL (ref 6–12)
POTASSIUM SERPL-SCNC: 3.7 MMOL/L (ref 3.5–5.2)
POTASSIUM SERPL-SCNC: 3.9 MMOL/L (ref 3.5–5.2)
PROCALCITONIN SERPL-MCNC: 0.06 NG/ML (ref 0–0.25)
PROT SERPL-MCNC: 6.2 G/DL (ref 6–8.5)
PROTHROMBIN TIME: 14.3 SECONDS (ref 11.7–14.2)
RBC # BLD AUTO: 5.04 10*6/MM3 (ref 3.77–5.28)
RHINOVIRUS RNA SPEC NAA+PROBE: NOT DETECTED
RSV RNA NPH QL NAA+NON-PROBE: NOT DETECTED
SARS-COV-2 RNA NPH QL NAA+NON-PROBE: DETECTED
SODIUM SERPL-SCNC: 138 MMOL/L (ref 136–145)
SODIUM SERPL-SCNC: 140 MMOL/L (ref 136–145)
STRESS TARGET HR: 110 BPM
WBC NRBC COR # BLD: 7.78 10*3/MM3 (ref 3.4–10.8)

## 2022-08-06 PROCEDURE — 85610 PROTHROMBIN TIME: CPT | Performed by: NURSE PRACTITIONER

## 2022-08-06 PROCEDURE — 93970 EXTREMITY STUDY: CPT

## 2022-08-06 PROCEDURE — 84145 PROCALCITONIN (PCT): CPT | Performed by: INTERNAL MEDICINE

## 2022-08-06 PROCEDURE — 82962 GLUCOSE BLOOD TEST: CPT

## 2022-08-06 PROCEDURE — 80048 BASIC METABOLIC PNL TOTAL CA: CPT | Performed by: NURSE PRACTITIONER

## 2022-08-06 PROCEDURE — 94799 UNLISTED PULMONARY SVC/PX: CPT

## 2022-08-06 PROCEDURE — 0202U NFCT DS 22 TRGT SARS-COV-2: CPT | Performed by: EMERGENCY MEDICINE

## 2022-08-06 PROCEDURE — 25010000002 ENOXAPARIN PER 10 MG: Performed by: INTERNAL MEDICINE

## 2022-08-06 PROCEDURE — 86140 C-REACTIVE PROTEIN: CPT | Performed by: INTERNAL MEDICINE

## 2022-08-06 PROCEDURE — 85025 COMPLETE CBC W/AUTO DIFF WBC: CPT | Performed by: NURSE PRACTITIONER

## 2022-08-06 PROCEDURE — 63710000001 INSULIN LISPRO (HUMAN) PER 5 UNITS: Performed by: NURSE PRACTITIONER

## 2022-08-06 PROCEDURE — 82728 ASSAY OF FERRITIN: CPT | Performed by: INTERNAL MEDICINE

## 2022-08-06 PROCEDURE — 83735 ASSAY OF MAGNESIUM: CPT | Performed by: NURSE PRACTITIONER

## 2022-08-06 PROCEDURE — 63710000001 PREDNISONE PER 5 MG: Performed by: NURSE PRACTITIONER

## 2022-08-06 PROCEDURE — 85379 FIBRIN DEGRADATION QUANT: CPT | Performed by: NURSE PRACTITIONER

## 2022-08-06 RX ORDER — SODIUM BICARBONATE 650 MG/1
650 TABLET ORAL 2 TIMES DAILY
Status: DISCONTINUED | OUTPATIENT
Start: 2022-08-06 | End: 2022-08-10 | Stop reason: HOSPADM

## 2022-08-06 RX ORDER — PREDNISONE 10 MG/1
10 TABLET ORAL EVERY OTHER DAY
Status: DISCONTINUED | OUTPATIENT
Start: 2022-08-06 | End: 2022-08-06

## 2022-08-06 RX ORDER — AMLODIPINE BESYLATE 10 MG/1
10 TABLET ORAL DAILY
Status: DISCONTINUED | OUTPATIENT
Start: 2022-08-06 | End: 2022-08-10 | Stop reason: HOSPADM

## 2022-08-06 RX ORDER — DEXAMETHASONE 6 MG/1
6 TABLET ORAL
Status: DISCONTINUED | OUTPATIENT
Start: 2022-08-06 | End: 2022-08-08

## 2022-08-06 RX ORDER — NITROGLYCERIN 0.4 MG/1
0.4 TABLET SUBLINGUAL
Status: DISCONTINUED | OUTPATIENT
Start: 2022-08-06 | End: 2022-08-10 | Stop reason: HOSPADM

## 2022-08-06 RX ORDER — ENOXAPARIN SODIUM 100 MG/ML
30 INJECTION SUBCUTANEOUS EVERY 24 HOURS
Status: DISCONTINUED | OUTPATIENT
Start: 2022-08-07 | End: 2022-08-06

## 2022-08-06 RX ORDER — ENOXAPARIN SODIUM 100 MG/ML
40 INJECTION SUBCUTANEOUS EVERY 24 HOURS
Status: DISCONTINUED | OUTPATIENT
Start: 2022-08-06 | End: 2022-08-06

## 2022-08-06 RX ORDER — TAMSULOSIN HYDROCHLORIDE 0.4 MG/1
0.4 CAPSULE ORAL DAILY
Status: DISCONTINUED | OUTPATIENT
Start: 2022-08-06 | End: 2022-08-10 | Stop reason: HOSPADM

## 2022-08-06 RX ORDER — INSULIN LISPRO 100 [IU]/ML
0-7 INJECTION, SOLUTION INTRAVENOUS; SUBCUTANEOUS
Status: DISCONTINUED | OUTPATIENT
Start: 2022-08-06 | End: 2022-08-07

## 2022-08-06 RX ORDER — ENOXAPARIN SODIUM 100 MG/ML
1 INJECTION SUBCUTANEOUS EVERY 24 HOURS
Status: DISCONTINUED | OUTPATIENT
Start: 2022-08-07 | End: 2022-08-08

## 2022-08-06 RX ORDER — ENOXAPARIN SODIUM 100 MG/ML
30 INJECTION SUBCUTANEOUS ONCE
Status: COMPLETED | OUTPATIENT
Start: 2022-08-06 | End: 2022-08-06

## 2022-08-06 RX ORDER — ONDANSETRON 2 MG/ML
4 INJECTION INTRAMUSCULAR; INTRAVENOUS EVERY 6 HOURS PRN
Status: DISCONTINUED | OUTPATIENT
Start: 2022-08-06 | End: 2022-08-10 | Stop reason: HOSPADM

## 2022-08-06 RX ORDER — MONTELUKAST SODIUM 10 MG/1
10 TABLET ORAL NIGHTLY
Status: DISCONTINUED | OUTPATIENT
Start: 2022-08-06 | End: 2022-08-10 | Stop reason: HOSPADM

## 2022-08-06 RX ORDER — LEVOTHYROXINE SODIUM 0.03 MG/1
25 TABLET ORAL
Status: DISCONTINUED | OUTPATIENT
Start: 2022-08-07 | End: 2022-08-10 | Stop reason: HOSPADM

## 2022-08-06 RX ORDER — ACETAMINOPHEN 650 MG/1
650 SUPPOSITORY RECTAL EVERY 4 HOURS PRN
Status: DISCONTINUED | OUTPATIENT
Start: 2022-08-06 | End: 2022-08-10 | Stop reason: HOSPADM

## 2022-08-06 RX ORDER — ROSUVASTATIN CALCIUM 5 MG/1
5 TABLET, COATED ORAL DAILY
Status: DISCONTINUED | OUTPATIENT
Start: 2022-08-06 | End: 2022-08-10 | Stop reason: HOSPADM

## 2022-08-06 RX ORDER — DEXTROSE MONOHYDRATE 25 G/50ML
25 INJECTION, SOLUTION INTRAVENOUS
Status: DISCONTINUED | OUTPATIENT
Start: 2022-08-06 | End: 2022-08-10 | Stop reason: HOSPADM

## 2022-08-06 RX ORDER — SODIUM CHLORIDE 0.9 % (FLUSH) 0.9 %
10 SYRINGE (ML) INJECTION EVERY 12 HOURS SCHEDULED
Status: DISCONTINUED | OUTPATIENT
Start: 2022-08-06 | End: 2022-08-10 | Stop reason: HOSPADM

## 2022-08-06 RX ORDER — ACETAMINOPHEN 325 MG/1
650 TABLET ORAL EVERY 4 HOURS PRN
Status: DISCONTINUED | OUTPATIENT
Start: 2022-08-06 | End: 2022-08-10 | Stop reason: HOSPADM

## 2022-08-06 RX ORDER — BENZONATATE 100 MG/1
100 CAPSULE ORAL NIGHTLY
Status: DISCONTINUED | OUTPATIENT
Start: 2022-08-06 | End: 2022-08-10 | Stop reason: HOSPADM

## 2022-08-06 RX ORDER — ACETAMINOPHEN 160 MG/5ML
650 SOLUTION ORAL EVERY 4 HOURS PRN
Status: DISCONTINUED | OUTPATIENT
Start: 2022-08-06 | End: 2022-08-10 | Stop reason: HOSPADM

## 2022-08-06 RX ORDER — SODIUM CHLORIDE 9 MG/ML
75 INJECTION, SOLUTION INTRAVENOUS CONTINUOUS
Status: DISCONTINUED | OUTPATIENT
Start: 2022-08-06 | End: 2022-08-06

## 2022-08-06 RX ORDER — SODIUM CHLORIDE 0.9 % (FLUSH) 0.9 %
10 SYRINGE (ML) INJECTION AS NEEDED
Status: DISCONTINUED | OUTPATIENT
Start: 2022-08-06 | End: 2022-08-10 | Stop reason: HOSPADM

## 2022-08-06 RX ORDER — SODIUM CHLORIDE 9 MG/ML
75 INJECTION, SOLUTION INTRAVENOUS CONTINUOUS
Status: ACTIVE | OUTPATIENT
Start: 2022-08-06 | End: 2022-08-07

## 2022-08-06 RX ORDER — GUAIFENESIN 600 MG/1
1200 TABLET, EXTENDED RELEASE ORAL EVERY 12 HOURS SCHEDULED
Status: DISCONTINUED | OUTPATIENT
Start: 2022-08-06 | End: 2022-08-10 | Stop reason: HOSPADM

## 2022-08-06 RX ORDER — FAMOTIDINE 20 MG/1
10 TABLET, FILM COATED ORAL 2 TIMES DAILY
Status: DISCONTINUED | OUTPATIENT
Start: 2022-08-06 | End: 2022-08-10 | Stop reason: HOSPADM

## 2022-08-06 RX ORDER — NICOTINE POLACRILEX 4 MG
15 LOZENGE BUCCAL
Status: DISCONTINUED | OUTPATIENT
Start: 2022-08-06 | End: 2022-08-10 | Stop reason: HOSPADM

## 2022-08-06 RX ADMIN — FAMOTIDINE 10 MG: 20 TABLET ORAL at 21:34

## 2022-08-06 RX ADMIN — METOPROLOL TARTRATE 12.5 MG: 25 TABLET ORAL at 21:34

## 2022-08-06 RX ADMIN — BENZONATATE 100 MG: 100 CAPSULE ORAL at 21:34

## 2022-08-06 RX ADMIN — ENOXAPARIN SODIUM 30 MG: 30 INJECTION SUBCUTANEOUS at 18:28

## 2022-08-06 RX ADMIN — SODIUM CHLORIDE 75 ML/HR: 9 INJECTION, SOLUTION INTRAVENOUS at 21:36

## 2022-08-06 RX ADMIN — TAMSULOSIN HYDROCHLORIDE 0.4 MG: 0.4 CAPSULE ORAL at 12:15

## 2022-08-06 RX ADMIN — FAMOTIDINE 10 MG: 20 TABLET ORAL at 12:15

## 2022-08-06 RX ADMIN — SODIUM CHLORIDE 75 ML/HR: 9 INJECTION, SOLUTION INTRAVENOUS at 09:43

## 2022-08-06 RX ADMIN — ACETAMINOPHEN 650 MG: 325 TABLET, FILM COATED ORAL at 06:37

## 2022-08-06 RX ADMIN — GUAIFENESIN 1200 MG: 600 TABLET, EXTENDED RELEASE ORAL at 12:15

## 2022-08-06 RX ADMIN — ACETAMINOPHEN 650 MG: 325 TABLET, FILM COATED ORAL at 21:34

## 2022-08-06 RX ADMIN — PREDNISONE 10 MG: 10 TABLET ORAL at 12:15

## 2022-08-06 RX ADMIN — SODIUM BICARBONATE 650 MG: 650 TABLET ORAL at 21:34

## 2022-08-06 RX ADMIN — SODIUM BICARBONATE 650 MG: 650 TABLET ORAL at 12:15

## 2022-08-06 RX ADMIN — INSULIN LISPRO 5 UNITS: 100 INJECTION, SOLUTION INTRAVENOUS; SUBCUTANEOUS at 18:28

## 2022-08-06 RX ADMIN — ACETAMINOPHEN 650 MG: 325 TABLET, FILM COATED ORAL at 17:55

## 2022-08-06 RX ADMIN — INSULIN LISPRO 5 UNITS: 100 INJECTION, SOLUTION INTRAVENOUS; SUBCUTANEOUS at 13:43

## 2022-08-06 RX ADMIN — ENOXAPARIN SODIUM 40 MG: 100 INJECTION SUBCUTANEOUS at 13:43

## 2022-08-06 RX ADMIN — ROSUVASTATIN CALCIUM 5 MG: 5 TABLET, FILM COATED ORAL at 12:15

## 2022-08-06 RX ADMIN — DEXAMETHASONE 6 MG: 6 TABLET ORAL at 13:44

## 2022-08-06 RX ADMIN — METOPROLOL TARTRATE 12.5 MG: 25 TABLET ORAL at 12:16

## 2022-08-06 RX ADMIN — Medication 10 ML: at 09:43

## 2022-08-06 RX ADMIN — GUAIFENESIN 1200 MG: 600 TABLET, EXTENDED RELEASE ORAL at 21:34

## 2022-08-06 RX ADMIN — AMLODIPINE BESYLATE 10 MG: 10 TABLET ORAL at 12:15

## 2022-08-06 RX ADMIN — MONTELUKAST SODIUM 10 MG: 10 TABLET, FILM COATED ORAL at 21:34

## 2022-08-06 RX ADMIN — Medication 10 ML: at 21:36

## 2022-08-06 NOTE — ED NOTES
Pt to triage from home vis [facility] [id number] with c/o covid positive and cough and short of air.  Pt seen here 2 days ago for same.  Pt wearing mask in triage.  Triage personnel wore appropriate PPE

## 2022-08-06 NOTE — PLAN OF CARE
Goal Outcome Evaluation:  Plan of Care Reviewed With: patient        Progress: improving  Outcome Evaluation: Pt AOx4. Pleasant and cooperative. On RA during the day. IS given, pt instructed on how to use it and performs well with numbers up to 1000. Flutter valve at bedside. Fluids encouraged. Pt encouraged to spend time sitting upright and to do deep breathing and coughing. Tylenol given for lower back pain. VSS. Safety maintained.

## 2022-08-06 NOTE — ED PROVIDER NOTES
EMERGENCY DEPARTMENT ENCOUNTER    CHIEF COMPLAINT  Chief Complaint: COVID symptoms  History given by: Patient  History limited by: None   Room Number: 10/10  PMD: Mariah Hickman MD      HPI:  Pt is a 89 y.o. female who presents complaining of multiple COVID-19 related symptoms that have been present for the past 1 week.  The patient was recently admitted secondary to generalized weakness and COVID-19.  She was discharged on oral steroids but reports his symptoms have progressively worsened since that point.  She complains of a nonproductive cough, headache, sore throat, generalized weakness, as well as shortness of breath with exertion.  The patient currently denies chest pain or fevers and chills.    Duration: 1 week  Onset: Gradual  Location: Generalized  Radiation: None  Quality: Weakness/shortness of breath  Intensity/Severity: Moderate  Progression: Worsening  Associated Symptoms: Headache, cough  Aggravating Factors: Exertion worsens dyspnea  Alleviating Factors: None  Previous Episodes: None  Treatment before arrival: Oral prednisone    PAST MEDICAL HISTORY  Active Ambulatory Problems     Diagnosis Date Noted   • Aortic valve stenosis 04/05/2016   • Fatigue 04/05/2016   • MI (mitral incompetence) 04/05/2016   • PVC (premature ventricular contraction) 04/05/2016   • Legally blind 05/25/2018   • Essential hypertension 05/25/2018   • Hypertriglyceridemia 05/31/2018   • Pulmonary hypertension (HCC) 06/25/2020   • Paroxysmal atrial fibrillation (HCC) 06/25/2020   • Anxiety 07/10/2014   • Chronic kidney disease 03/20/2015   • Chronic pain disorder 01/26/2022   • Degeneration of lumbar intervertebral disc 09/09/2014   • Type 2 diabetes mellitus with hyperglycemia (HCC) 01/26/2022   • Esophageal reflux 07/10/2014   • Gastroparesis 01/26/2022   • Hiatal hernia 01/26/2022   • Iatrogenic hypothyroidism 07/08/2014   • Macular degeneration 01/26/2022   • Malignant neoplasm of uterus (HCC) 01/26/2022   •  Osteoarthritis of knee 07/10/2014   • Peripheral nerve disease 07/10/2014   • Secondary hyperparathyroidism (HCC) 04/05/2016   • Thrombocytopenia (HCC) 07/11/2014   • Chronic heart failure with preserved ejection fraction (HCC) 03/02/2022   • Other cirrhosis of liver (HCC) 03/03/2022   • Hypothyroidism 02/22/2022   • Nonrheumatic tricuspid valve regurgitation 02/22/2022   • COVID-19 07/30/2022   • Anemia, chronic disease 07/30/2022   • Weakness generalized 07/30/2022   • Cytokine release syndrome, grade 1 08/02/2022   • Urine retention 08/02/2022     Resolved Ambulatory Problems     Diagnosis Date Noted   • Diastolic dysfunction 04/05/2016   • Hypertensive pulmonary vascular disease (HCC) 04/05/2016   • S/P AVR 05/10/2017   • Breast screening 07/08/2014   • Abdominal pain, right lower quadrant 12/13/2020   • Allergic rhinitis 07/10/2014   • Angina pectoris ([facility]) 07/10/2014   • Pancreatitis 01/26/2022   • Colitis due to Clostridioides difficile 01/26/2022   • Hypothyroidism 01/26/2022   • Long term (current) use of opiate analgesic 01/26/2022   • Uncontrolled type 2 diabetes mellitus 07/08/2014   • Benign essential hypertension 07/08/2014   • Hypertension 01/26/2022   • Pulmonary hypertension (HCC) 04/05/2016   • Hypercholesterolemia 01/26/2022   • Hyperlipidemia 07/08/2014   • Legal blindness 05/25/2018   • Mitral valve disorder 07/10/2014   • Mitral valve regurgitation 04/05/2016   • Ventricular premature beats 04/05/2016   • History of aortic valve replacement 05/10/2017   • Nonrheumatic tricuspid valve regurgitation    • Elevated serum creatinine    • CHF (congestive heart failure) ([facility]) 03/02/2022   • Hypertensive disorder 02/22/2022   • Pancreatitis 02/22/2022   • Sepsis ([facility]) 06/05/2022   • Hyponatremia 07/30/2022   • Leukopenia 08/02/2022     Past Medical History:   Diagnosis Date   • Back pain    • CKD (chronic kidney disease)    • Diverticulosis    • Exertional shortness of breath    • Heart disease    •  Hyperthyroidism    • Left ventricular hypertrophy    • Liver disease    • Mitral regurgitation    • Osteoarthritis of hip    • Premature ventricular contractions    • Renal insufficiency syndrome    • Type 2 diabetes mellitus (HCC)    • Uterine cancer (HCC)        PAST SURGICAL HISTORY  Past Surgical History:   Procedure Laterality Date   • AORTIC VALVE REPAIR/REPLACEMENT     • CATARACT EXTRACTION      ,    • ENDOSCOPY  08/15/2014    no gross lesions in stomach/duodenum, erythrematous mucosa in stomach   • HYSTERECTOMY     • STERNOTOMY         FAMILY HISTORY  Family History   Problem Relation Age of Onset   • Heart disease Mother    • Hypertension Mother    • Stroke Mother    • Diabetes Mother         mellitus   • Other Other         cardiovascular disorder       SOCIAL HISTORY  Social History     Socioeconomic History   • Marital status:    Tobacco Use   • Smoking status: Former Smoker     Packs/day: 0.50     Quit date: 7/10/1969     Years since quittin.1   • Smokeless tobacco: Never Used   Substance and Sexual Activity   • Alcohol use: No     Comment: caffeine use - coffee 2 cups daily   • Drug use: No       ALLERGIES  Erythromycin, Cephalexin, Penicillins, and Sulfa antibiotics    REVIEW OF SYSTEMS  Review of Systems   Constitutional: Negative for fever.   HENT: Positive for sore throat.    Eyes: Negative.    Respiratory: Positive for cough and shortness of breath.    Cardiovascular: Negative for chest pain.   Gastrointestinal: Negative for abdominal pain, diarrhea and vomiting.   Genitourinary: Negative for dysuria.   Musculoskeletal: Negative for neck pain.   Skin: Negative for rash.   Allergic/Immunologic: Negative.    Neurological: Positive for weakness and headaches. Negative for numbness.   Hematological: Negative.    Psychiatric/Behavioral: Negative.    All other systems reviewed and are negative.      PHYSICAL EXAM  ED Triage Vitals [22 2139]   Temp Heart Rate Resp BP SpO2    97.5 °F (36.4 °C) 59 18 169/77 100 %      Temp src Heart Rate Source Patient Position BP Location FiO2 (%)   Tympanic Monitor -- -- --       Physical Exam  Vitals and nursing note reviewed.   Constitutional:       General: She is not in acute distress.  HENT:      Head: Normocephalic and atraumatic.   Eyes:      Pupils: Pupils are equal, round, and reactive to light.   Cardiovascular:      Rate and Rhythm: Normal rate and regular rhythm.      Heart sounds: Normal heart sounds.   Pulmonary:      Effort: Pulmonary effort is normal. No respiratory distress.      Breath sounds: Normal breath sounds.   Abdominal:      Palpations: Abdomen is soft.      Tenderness: There is no abdominal tenderness. There is no guarding or rebound.   Musculoskeletal:         General: Normal range of motion.      Cervical back: Normal range of motion and neck supple.   Skin:     General: Skin is warm and dry.      Findings: No rash.   Neurological:      Mental Status: She is alert and oriented to person, place, and time.      Sensory: Sensation is intact.   Psychiatric:         Mood and Affect: Mood and affect normal.         LAB RESULTS  Lab Results (last 24 hours)     Procedure Component Value Units Date/Time    CBC & Differential [[id number]]  (Abnormal) Collected: 08/05/22 2340    Specimen: Blood Updated: 08/05/22 2357    Narrative:      The following orders were created for panel order CBC & Differential.  Procedure                               Abnormality         Status                     ---------                               -----------         ------                     CBC Auto Differential[[id number]]        Abnormal            Final result                 Please view results for these tests on the individual orders.    Comprehensive Metabolic Panel [[id number]]  (Abnormal) Collected: 08/05/22 2340    Specimen: Blood Updated: 08/06/22 0037     Glucose 64 mg/dL      BUN 28 mg/dL      Creatinine 1.42 mg/dL      Sodium 138 mmol/L       Potassium 3.7 mmol/L      Chloride 100 mmol/L      CO2 23.4 mmol/L      Calcium 8.8 mg/dL      Total Protein 6.2 g/dL      Albumin 3.80 g/dL      ALT (SGPT) 63 U/L      AST (SGOT) 35 U/L      Alkaline Phosphatase 64 U/L      Total Bilirubin 0.4 mg/dL      Globulin 2.4 gm/dL      A/G Ratio 1.6 g/dL      BUN/Creatinine Ratio 19.7     Anion Gap 14.6 mmol/L      eGFR 35.0 mL/min/1.73      Comment: National Kidney Foundation and American Society of Nephrology (ASN) Task Force recommended calculation based on the Chronic Kidney Disease Epidemiology Collaboration (CKD-EPI) equation refit without adjustment for race.       Narrative:      GFR Normal >60  Chronic Kidney Disease <60  Kidney Failure <15      Lactic Acid, Plasma [[id number]]  (Normal) Collected: 08/05/22 2340    Specimen: Blood Updated: 08/06/22 0012     Lactate 1.8 mmol/L     CBC Auto Differential [[id number]]  (Abnormal) Collected: 08/05/22 2340    Specimen: Blood Updated: 08/05/22 2357     WBC 8.85 10*3/mm3      RBC 4.79 10*6/mm3      Hemoglobin 13.9 g/dL      Hematocrit 41.7 %      MCV 87.1 fL      MCH 29.0 pg      MCHC 33.3 g/dL      RDW 14.7 %      RDW-SD 45.8 fl      MPV 11.9 fL      Platelets 98 10*3/mm3      Neutrophil % 64.5 %      Lymphocyte % 24.2 %      Monocyte % 9.3 %      Eosinophil % 0.8 %      Basophil % 0.1 %      Neutrophils, Absolute 5.71 10*3/mm3      Lymphocytes, Absolute 2.14 10*3/mm3      Monocytes, Absolute 0.82 10*3/mm3      Eosinophils, Absolute 0.07 10*3/mm3      Basophils, Absolute 0.01 10*3/mm3     POC Glucose Once [[id number]]  (Normal) Collected: 08/06/22 0123    Specimen: Blood Updated: 08/06/22 0124     Glucose 110 mg/dL      Comment: Meter: [id number] : [id number] Aden Wolf RN       Respiratory Panel PCR w/COVID-19(SARS-CoV-2) LATONIA/CHARLIE/ALEJANDRO/PAD/COR/MAD/PASQUALE In-House, NP Swab in UTM/VTM, 3-4 HR TAT - Swab, Nasopharynx [[id number]] Collected: 08/06/22 0152    Specimen: Swab from Nasopharynx Updated: 08/06/22 2141           I ordered the above labs and reviewed the results    RADIOLOGY  XR Chest 1 View   Final Result   No acute findings.       This report was finalized on 8/6/2022 12:26 AM by Dr. Paris Carlos M.D.               I ordered the above noted radiological studies. Interpreted by radiologist.  Reviewed by me in PACS.       PROCEDURES  Procedures      PROGRESS AND CONSULTS     The patient was wearing a facemask upon entrance into the room and remained in such throughout their visit.  I was wearing PPE including a facemask, eye protection, as well as gloves at any point entering the room and throughout the visit    0105  On reevaluation, the patient does remain nontoxic but continues to look as though she feels extremely poorly.  Given the fact that she has COVID-19 as well as a low blood sugar event, would like to admit her to the hospital today for further management and treatment.  I did discuss this with the patient as well as her son who is a physician.  They agree with the plan and all questions have been answered.    0140  Case discussed with RIGO Trinidad for [facility], who agrees to admit the patient to the hospital for Dr. Fay.    MEDICAL DECISION MAKING  Results were reviewed/discussed with the patient and they were also made aware of online access. Pt also made aware that some labs, such as cultures, will not be resulted during ER visit and follow up with PMD is necessary.     MDM  Number of Diagnoses or Management Options     Amount and/or Complexity of Data Reviewed  Clinical lab tests: ordered and reviewed  Tests in the radiology section of CPT®: reviewed and ordered  Review and summarize past medical records: yes (Upon medical records review, the patient was last seen and evaluated on 7/30/2022 secondary to generalized weakness with COVID-19)  Discuss the patient with other providers: yes (RIGO Trinidad for [facility], who will admit the patient to Dr. Fay)  Independent visualization  of images, tracings, or specimens: yes (Unremarkable chest x-ray)           DIAGNOSIS  Final diagnoses:   COVID-19 virus infection   Generalized weakness   Hypoglycemia       DISPOSITION  ADMISSION    Discussed treatment plan and reason for admission with pt/family and admitting physician.  Pt/family voiced understanding of the plan for admission for further testing/treatment as needed.         Latest Documented Vital Signs:  As of 02:33 EDT  BP- 169/77 HR- 59 Temp- 97.5 °F (36.4 °C) (Tympanic) O2 sat- 100%         Wesly Cervantes MD  08/06/22 0233

## 2022-08-06 NOTE — PLAN OF CARE
Goal Outcome Evaluation:  Plan of Care Reviewed With: patient        Progress: no change  Outcome Evaluation: Pt readmitted after being d/c'd home on 8/2. Pt has dry cough and achy all over ribs hurt. Tylenol given. Waiting for daughter to confirm med list. Pt oriented and alert. On 2L of O2. Does not wear O2 at home. Uses walker at home has not been out of bed and has no appetite. Skin in pink/red blanchable with old resurfaced wound on buttocks. CTM, safety maintained.

## 2022-08-06 NOTE — DISCHARGE PLACEMENT REQUEST
"Helena Carmen (89 y.o. Female)             Date of Birth   02/20/1931    Social Security Number       Address   [address] [place] [address]    Home Phone   [phone]    MRN   [id number]       [place]   Sikhism    Marital Status                               Admission Date   8/5/22    Admission Type   Emergency    Admitting Provider   Ovidio Anguiano MD    Attending Provider   Ovidio Anguiano MD    Department, Room/Bed   [address], [address]       Discharge Date       Discharge Disposition       Discharge Destination                               Attending Provider: Ovidio Anguiano MD    Allergies: Erythromycin, Cephalexin, Penicillins, Sulfa Antibiotics    Isolation: Enh Drop/Con   Infection: COVID (confirmed) (07/30/22)   Code Status: CPR   Advance Care Planning Activity    Ht: 144.8 cm (57\")   Wt: 72.1 kg (158 lb 15.2 oz)    Admission Cmt: None   Principal Problem: COVID-19 virus infection [U07.1]                 Active Insurance as of 8/5/2022     Primary Coverage     Payor Plan Insurance Group Employer/Plan Group    MEDICARE MEDICARE A & B      Payor Plan Address Payor Plan Phone Number Payor Plan Fax Number Effective Dates    [address] [phone]  2/1/1996 - None Entered    [facility] [id number]       Subscriber Name Subscriber Birth Date Member ID       HELENA CARMEN 2/20/1931 [id number]           Secondary Coverage     Payor Plan Insurance Group Employer/Plan Group     FOR LIFE  FOR LIFE  SUP       Payor Plan Address Payor Plan Phone Number Payor Plan Fax Number Effective Dates    [address] [phone]  4/5/2016 - None Entered    [facility] [address]       Subscriber Name Subscriber Birth Date Member ID       HELENA CARMEN 2/20/1931 [id number]                 Emergency Contacts      (Rel.) Home Phone Work Phone Mobile Phone    Brandon carmen (Son) [phone] -- --    Radha Hoyos " (Daughter) [phone] -- [phone]    Margaret Loco (Daughter) [phone] -- [phone]

## 2022-08-06 NOTE — H&P
Patient Name:  Helena Carmen  YOB: 1931  MRN:  [id number]  Admit Date:  8/5/2022  Patient Care Team:  Mariah Hickman MD as PCP - General (Family Medicine)  Beth Butcher MD as Consulting Physician (Cardiology)  Rolando Wick MD as Consulting Physician (Nephrology)  Pablo Sherman Jr., MD as Consulting Physician (Hematology and Oncology)  Mango Arnold MD as Referring Physician (Internal Medicine)      Subjective   History Present Illness     Chief Complaint   Patient presents with   • covid positive   • Cough   • dyspnea on exertion       Ms. Carmen is a 89 y.o. female with a history of porcine AVR, CABRERA cirrhosis, diabetes mellitus type 2, chronic kidney disease, chronic vision loss that presents to [facility] complaining of cough, shortness of breath and weakness.  She feels her cough and shortness of breath have not improved much at all since initial onset with her COVID-19 diagnosis.  She has been very weak at home and not eating or drinking very much.  She denies any chest pain, fever or chills.  Cough is harsh, frequent and nonproductive.  She is dyspneic at rest with worsening on exertion.  She complains of a sore throat worse with talking.    Chart review: Patient was admitted for COVID-19 symptoms and discharged on 7/30.  Primary symptoms at that time weakness and fatigue with some diarrhea.  She was seen by urology during that admission for her chronic urinary retention and was discharged with recommendations to do intermittent caths.  She had cardiology work-up for increased troponin with nonconcerning echo.  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      Review of Systems   Constitutional: Positive for appetite change and fatigue. Negative for chills and fever.   HENT: Positive for congestion. Negative for sore throat.    Eyes: Negative for visual disturbance.   Respiratory: Positive for cough and  shortness of breath.    Cardiovascular: Negative for chest pain, palpitations and leg swelling.   Gastrointestinal: Positive for nausea. Negative for abdominal distention, abdominal pain, constipation, diarrhea and vomiting.   Genitourinary: Negative for difficulty urinating.   Musculoskeletal: Negative for arthralgias and myalgias.   Skin: Negative for rash and wound.   Neurological: Positive for weakness. Negative for dizziness, light-headedness and headaches.        Personal History     Past Medical History:   Diagnosis Date   • Aortic valve stenosis     s/p tissue AVR   • Back pain    • CKD (chronic kidney disease)    • Colitis due to Clostridioides difficile 01/26/2022   • Diastolic dysfunction     Grade 2 per echocardiogram 2013   • Diverticulosis    • Exertional shortness of breath    • Heart disease    • Hiatal hernia    • Hyperlipidemia    • Hypertension    • Hyperthyroidism    • Hypertriglyceridemia 05/31/2018   • Hypothyroidism    • Left ventricular hypertrophy    • Legally blind    • Liver disease    • Macular degeneration    • Mitral regurgitation    • Osteoarthritis of hip    • Pancreatitis 01/26/2022   • Paroxysmal atrial fibrillation (HCC)    • Premature ventricular contractions    • Pulmonary hypertension (HCC)    • Renal insufficiency syndrome    • Type 2 diabetes mellitus (HCC)    • Uterine cancer (HCC)      Past Surgical History:   Procedure Laterality Date   • AORTIC VALVE REPAIR/REPLACEMENT     • CATARACT EXTRACTION      1970, 1999   • ENDOSCOPY  08/15/2014    no gross lesions in stomach/duodenum, erythrematous mucosa in stomach   • HYSTERECTOMY  2007   • STERNOTOMY       Family History   Problem Relation Age of Onset   • Heart disease Mother    • Hypertension Mother    • Stroke Mother    • Diabetes Mother         mellitus   • Other Other         cardiovascular disorder     Social History     Tobacco Use   • Smoking status: Former Smoker     Packs/day: 0.50     Quit date: 7/10/1969     Years  since quittin.1   • Smokeless tobacco: Never Used   Substance Use Topics   • Alcohol use: No     Comment: caffeine use - coffee 2 cups daily   • Drug use: No     No current facility-administered medications on file prior to encounter.     Current Outpatient Medications on File Prior to Encounter   Medication Sig Dispense Refill   • acetaminophen (TYLENOL) 325 MG tablet Take 650 mg by mouth Every 6 (Six) Hours As Needed.     • amLODIPine (NORVASC) 10 MG tablet Take 1 tablet by mouth daily.     • famotidine (PEPCID) 10 MG tablet Take 10 mg by mouth 2 (Two) Times a Day.     • furosemide (LASIX) 20 MG tablet Take 1 tablet by mouth Daily. 30 tablet 11   • gabapentin (NEURONTIN) 400 MG capsule Take 400 mg by mouth 2 (Two) Times a Day.     • insulin lispro (humaLOG) 100 UNIT/ML injection Inject 0-10 Units under the skin into the appropriate area as directed 3 (Three) Times a Day Before Meals. 2 units for every 50 > 150     • levothyroxine (SYNTHROID, LEVOTHROID) 25 MCG tablet Take 1 tablet by mouth daily.     • metoprolol tartrate (LOPRESSOR) 25 MG tablet Take 12.5 mg by mouth 2 (Two) Times a Day.     • montelukast (SINGULAIR) 10 MG tablet Take 1 tablet by mouth Every Night.     • polyethylene glycol (MIRALAX) 17 GM/SCOOP powder Take 17 g by mouth Daily.     • predniSONE (DELTASONE) 5 MG tablet Take 2 tablets by mouth Daily for 3 days, THEN 2 tablets Every Other Day for 3 days. 30 tablet 0   • rosuvastatin (CRESTOR) 5 MG tablet Take 1 tablet by mouth Daily.     • sodium bicarbonate 650 MG tablet Take 1 tablet by mouth 2 (Two) Times a Day. 60 tablet 0   • tamsulosin (FLOMAX) 0.4 MG capsule 24 hr capsule Take 0.4 mg by mouth every night at bedtime.     • LORazepam (ATIVAN) 0.5 MG tablet        Allergies   Allergen Reactions   • Erythromycin Unknown (See Comments) and Other (See Comments)     Pt states she does not remember but it was many years ago  Pt states she does not remember but it was many years ago   •  Cephalexin Other (See Comments) and Unknown (See Comments)     June 2022 Pt has tolerated ceftriaxone and cefepime during admission.   Pt states she does not remember reaction but it was many years ago   • Penicillins Rash   • Sulfa Antibiotics Itching and Rash       Objective    Objective     Vital Signs  Temp:  [97.5 °F (36.4 °C)-97.6 °F (36.4 °C)] 97.6 °F (36.4 °C)  Heart Rate:  [59-71] 71  Resp:  [18] 18  BP: (134-172)/(53-78) 134/53  SpO2:  [87 %-100 %] 99 %  on  Flow (L/min):  [1.5-2] 2;   Device (Oxygen Therapy): room air  Body mass index is 34.4 kg/m².    Physical Exam  Constitutional:       General: She is not in acute distress.     Appearance: She is obese. She is ill-appearing. She is not toxic-appearing.   HENT:      Head: Normocephalic and atraumatic.      Mouth/Throat:      Mouth: Mucous membranes are dry.   Eyes:      Extraocular Movements: Extraocular movements intact.      Pupils: Pupils are equal, round, and reactive to light.   Cardiovascular:      Rate and Rhythm: Normal rate and regular rhythm.      Pulses: Normal pulses.      Heart sounds: Normal heart sounds.   Pulmonary:      Effort: No respiratory distress.      Breath sounds: Rhonchi present. No rales.      Comments: Tachypneic and mildly labored with cough and talking  Rhonchi to bilateral upper lobes  Audible wheeze after cough  Abdominal:      General: Bowel sounds are normal. There is no distension.      Palpations: Abdomen is soft.      Tenderness: There is no abdominal tenderness.      Comments: Obese   Musculoskeletal:         General: No swelling or tenderness. Normal range of motion.      Cervical back: Normal range of motion.      Comments: Appears generally weak with symmetrical weakness of all limbs   Skin:     General: Skin is warm and dry.   Neurological:      General: No focal deficit present.      Mental Status: She is alert and oriented to person, place, and time.         Results Review:  I reviewed the patient's new  clinical results.      Lab Results (last 24 hours)     Procedure Component Value Units Date/Time    CBC & Differential [[id number]]  (Abnormal) Collected: 08/05/22 2340    Specimen: Blood Updated: 08/05/22 2357    Narrative:      The following orders were created for panel order CBC & Differential.  Procedure                               Abnormality         Status                     ---------                               -----------         ------                     CBC Auto Differential[[id number]]        Abnormal            Final result                 Please view results for these tests on the individual orders.    Comprehensive Metabolic Panel [[id number]]  (Abnormal) Collected: 08/05/22 2340    Specimen: Blood Updated: 08/06/22 0037     Glucose 64 mg/dL      BUN 28 mg/dL      Creatinine 1.42 mg/dL      Sodium 138 mmol/L      Potassium 3.7 mmol/L      Chloride 100 mmol/L      CO2 23.4 mmol/L      Calcium 8.8 mg/dL      Total Protein 6.2 g/dL      Albumin 3.80 g/dL      ALT (SGPT) 63 U/L      AST (SGOT) 35 U/L      Alkaline Phosphatase 64 U/L      Total Bilirubin 0.4 mg/dL      Globulin 2.4 gm/dL      A/G Ratio 1.6 g/dL      BUN/Creatinine Ratio 19.7     Anion Gap 14.6 mmol/L      eGFR 35.0 mL/min/1.73      Comment: National Kidney Foundation and American Society of Nephrology (ASN) Task Force recommended calculation based on the Chronic Kidney Disease Epidemiology Collaboration (CKD-EPI) equation refit without adjustment for race.       Narrative:      GFR Normal >60  Chronic Kidney Disease <60  Kidney Failure <15      Lactic Acid, Plasma [[id number]]  (Normal) Collected: 08/05/22 2340    Specimen: Blood Updated: 08/06/22 0012     Lactate 1.8 mmol/L     CBC Auto Differential [[id number]]  (Abnormal) Collected: 08/05/22 2340    Specimen: Blood Updated: 08/05/22 2357     WBC 8.85 10*3/mm3      RBC 4.79 10*6/mm3      Hemoglobin 13.9 g/dL      Hematocrit 41.7 %      MCV 87.1 fL      MCH 29.0 pg      MCHC 33.3 g/dL       RDW 14.7 %      RDW-SD 45.8 fl      MPV 11.9 fL      Platelets 98 10*3/mm3      Neutrophil % 64.5 %      Lymphocyte % 24.2 %      Monocyte % 9.3 %      Eosinophil % 0.8 %      Basophil % 0.1 %      Neutrophils, Absolute 5.71 10*3/mm3      Lymphocytes, Absolute 2.14 10*3/mm3      Monocytes, Absolute 0.82 10*3/mm3      Eosinophils, Absolute 0.07 10*3/mm3      Basophils, Absolute 0.01 10*3/mm3     POC Glucose Once [[id number]]  (Normal) Collected: 08/06/22 0123    Specimen: Blood Updated: 08/06/22 0124     Glucose 110 mg/dL      Comment: Meter: [id number] : [id number] Aden Wolf RN       Respiratory Panel PCR w/COVID-19(SARS-CoV-2) LATONIA/CHARLIE/ALEJANDRO/PAD/COR/MAD/PASQUALE In-House, NP Swab in UTM/VTM, 3-4 HR TAT - Swab, Nasopharynx [[id number]]  (Abnormal) Collected: 08/06/22 0152    Specimen: Swab from Nasopharynx Updated: 08/06/22 0555     ADENOVIRUS, PCR Not Detected     Coronavirus 229E Not Detected     Coronavirus HKU1 Not Detected     Coronavirus NL63 Not Detected     Coronavirus OC43 Not Detected     COVID19 Detected     Human Metapneumovirus Not Detected     Human Rhinovirus/Enterovirus Not Detected     Influenza A PCR Not Detected     Influenza B PCR Not Detected     Parainfluenza Virus 1 Not Detected     Parainfluenza Virus 2 Not Detected     Parainfluenza Virus 3 Not Detected     Parainfluenza Virus 4 Not Detected     RSV, PCR Not Detected     Bordetella pertussis pcr Not Detected     Bordetella parapertussis PCR Not Detected     Chlamydophila pneumoniae PCR Not Detected     Mycoplasma pneumo by PCR Not Detected    Narrative:      In the setting of a positive respiratory panel with a viral infection PLUS a negative procalcitonin without other underlying concern for bacterial infection, consider observing off antibiotics or discontinuation of antibiotics and continue supportive care. If the respiratory panel is positive for atypical bacterial infection (Bordetella pertussis, Chlamydophila pneumoniae, or  Mycoplasma pneumoniae), consider antibiotic de-escalation to target atypical bacterial infection.    Basic Metabolic Panel [[id number]]  (Abnormal) Collected: 08/06/22 0415    Specimen: Blood Updated: 08/06/22 0533     Glucose 93 mg/dL      BUN 27 mg/dL      Creatinine 1.39 mg/dL      Sodium 140 mmol/L      Potassium 3.9 mmol/L      Comment: Slight hemolysis detected by analyzer. Results may be affected.        Chloride 103 mmol/L      CO2 23.0 mmol/L      Calcium 8.7 mg/dL      BUN/Creatinine Ratio 19.4     Anion Gap 14.0 mmol/L      eGFR 35.9 mL/min/1.73      Comment: National Kidney Foundation and American Society of Nephrology (ASN) Task Force recommended calculation based on the Chronic Kidney Disease Epidemiology Collaboration (CKD-EPI) equation refit without adjustment for race.       Narrative:      GFR Normal >60  Chronic Kidney Disease <60  Kidney Failure <15      CBC Auto Differential [[id number]]  (Abnormal) Collected: 08/06/22 0415    Specimen: Blood Updated: 08/06/22 0554     WBC 7.78 10*3/mm3      RBC 5.04 10*6/mm3      Hemoglobin 14.2 g/dL      Hematocrit 44.0 %      MCV 87.3 fL      MCH 28.2 pg      MCHC 32.3 g/dL      RDW 14.6 %      RDW-SD 46.2 fl      MPV 11.5 fL      Platelets 89 10*3/mm3      Neutrophil % 57.6 %      Lymphocyte % 30.5 %      Monocyte % 9.5 %      Eosinophil % 0.9 %      Basophil % 0.3 %      Neutrophils, Absolute 4.49 10*3/mm3      Lymphocytes, Absolute 2.37 10*3/mm3      Monocytes, Absolute 0.74 10*3/mm3      Eosinophils, Absolute 0.07 10*3/mm3      Basophils, Absolute 0.02 10*3/mm3     Protime-INR [[id number]]  (Abnormal) Collected: 08/06/22 0415    Specimen: Blood Updated: 08/06/22 0529     Protime 14.3 Seconds      INR 1.13    Magnesium [[id number]]  (Normal) Collected: 08/06/22 0415    Specimen: Blood Updated: 08/06/22 0533     Magnesium 2.0 mg/dL     POC Glucose Once [[id number]]  (Normal) Collected: 08/06/22 0617    Specimen: Blood Updated: 08/06/22 0618     Glucose 106 mg/dL       Comment: Meter: [id number] : [id number] Wily Daniels RN             Imaging Results (Last 24 Hours)     Procedure Component Value Units Date/Time    XR Chest 1 View [[id number]] Collected: 08/06/22 0024     Updated: 08/06/22 0029    Narrative:      SINGLE VIEW OF THE CHEST     HISTORY: Cough     COMPARISON: 07/30/2022     FINDINGS:  There is mild cardiomegaly. There is no vascular congestion. Patient is  status post median sternotomy. No pneumothorax, pleural effusion, or  acute infiltrate is seen. The patient has extensive calcification of the  mitral valve annulus.       Impression:      No acute findings.     This report was finalized on 8/6/2022 12:26 AM by Dr. Paris Carlos M.D.             Results for orders placed during the hospital encounter of 07/30/22    Adult Transthoracic Echo Complete W/ Cont if Necessary Per Protocol    Interpretation Summary  · Left ventricular ejection fraction appears to be 61 - 65%. Left ventricular systolic function is normal.  · Left ventricular wall thickness is consistent with mild concentric hypertrophy.  · Left ventricular diastolic function is consistent with (grade II w/high LAP) pseudonormalization.  · Normal right ventricular cavity size and systolic function noted.  · The left atrial cavity is moderately dilated.  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 The aortic valve peak and mean gradients are within defined limits. The prosthetic aortic valve is grossly normal.  · There is severe mitral annular calcification. The mitral valve leaflets are thickened. There are several calcified chordae  · Mild mitral valve regurgitation is present. No significant mitral valve stenosis is present.  · Mild to moderate tricuspid valve regurgitation is present.  · Calculated right ventricular systolic pressure from tricuspid regurgitation is 37 mmHg.  · There is no evidence of pericardial effusion      No orders to display        Assessment/Plan     Active  Hospital Problems    Diagnosis  POA   • **COVID-19 virus infection [U07.1]  Yes   • Anemia, chronic disease [D63.8]  Yes   • Chronic heart failure with preserved ejection fraction (HCC) [I50.32]  Yes   • Type 2 diabetes mellitus with hyperglycemia (HCC) [E11.65]  Yes   • Malignant neoplasm of uterus (HCC) [C55]  Yes   • Pulmonary hypertension (HCC) [I27.20]  Yes   • Paroxysmal atrial fibrillation (HCC) [I48.0]  Yes   • Chronic kidney disease [N18.9]  Yes      Resolved Hospital Problems   No resolved problems to display.     Creatinine 1.39, stable at baseline.  BUN 27  INR 1.13.  Lactate normal  WBC fairly unremarkable excluding the platelet level 89, which is stable from previous.  Chest x-ray negative acute    She has some scattered rhonchi in upper lobes and congested nonproductive cough.  Looks a little bit dry on assessment with decreased intake over the past few days.  We will run IV fluids for 15 hours and monitor closely for fluid overload.  Mucinex.  Tessalon Perles at bedtime only.  Encourage pulmonary hygiene with incentive spirometry.  O2 as needed to keep sats greater than 94%.  She is currently on 2 L satting 99%.  No respiratory distress concerns.  Suspect she is just still recovering from this virus and having some ongoing weakness.  We will consult PT, OT, CCP and anticipates she may need some placement after this discharge.  Supportive care for now.  Add lozenges and alternate hot tea and sugar-free ice cream for sore throat.    Cover her glucose with NovoLog SSI low-dose.  She did have some hypoglycemia in the ED which was symptomatic.  Currently meal intake is acceptable.  Continue Norvasc, Lopressor and monitor blood pressure.  Continue her home prednisone every other day.    Bladder scan every 6 hours and In-N-Out cath for urinary retention greater than 300 mL.        I discussed the patient's findings and my recommendations with patient.    VTE Prophylaxis - SCDs.  Code Status - Full code.        RIGO Mortensne  [facility]  08/06/22  12:00 EDT

## 2022-08-06 NOTE — CASE MANAGEMENT/SOCIAL WORK
Discharge Planning Assessment  [facility]     Patient Name: Helena Carmen  MRN: [id number]  Today's Date: 8/6/2022    Admit Date: 8/5/2022     Discharge Needs Assessment     Row Name 08/06/22 1151       Living Environment    People in Home child(edison), adult    Current Living Arrangements home    Primary Care Provided by child(edison)    Provides Primary Care For no one    Quality of Family Relationships helpful;involved;supportive    Able to Return to Prior Arrangements yes       Transition Planning    Patient/Family Anticipates Transition to home with help/services;home with family    Patient/Family Anticipated Services at Transition home health care    Transportation Anticipated family or friend will provide       Discharge Needs Assessment    Equipment Currently Used at Home cane, straight;glucometer;grab bar;walker, rolling;wheelchair;shower chair;scales    Concerns to be Addressed discharge planning    Anticipated Changes Related to Illness none    Equipment Needed After Discharge none    Discharge Facility/Level of Care Needs home with home health               Discharge Plan     Row Name 08/06/22 1200       Plan    Plan Return home with 24/7 family assist and [facility] health    Patient/Family in Agreement with Plan yes    Plan Comments CCP attempted to reach patient by phone due to isolation precautions but unable to reach. Outbound call to daughter Radha. Introduced self and explained CCP role. Verified face sheet. Patient lives alone and receives 24/7 assistance from her children and niece. Her family assists her with all ADLs and provide transportation. Patient fills prescriptions at [facility] on [address] and Radha is agreeable to meds to beds. She has AD/LW documents on file in epic. For DME patient has a cane, walker, shower chair, glucometer, and transport chair. She is current with [facility] and has been to [place] for SNF. At discharge Radha plans for patient to return home with  Abdirashid  and 24/7 family assist. She denies needs at this time. CCP following clinical course to assist with any discharge planning needs. Olga SCHOFIELD RN/CCP              Continued Care and Services - Admitted Since 8/5/2022     Home Medical Care     Service Provider Request Status Selected Services Address Phone Fax Patient Pref[facility]    [facility]  [facility] - Request Sent N/A [address], [address], [place] [address] [phone] --            Selected Continued Care - Prior Encounters Includes selections from prior encounters from 5/7/2022 to 8/6/2022    Discharged on 8/2/2022 Admission date: 7/30/2022 - Discharge disposition: Home-Health Care [facility]    Home Medical Care     Service Provider Selected Services Address Phone Fax Patient [facility]    [facility] [address], [address], [place] [address] [phone] --                Discharged on 6/9/2022 Admission date: 6/5/2022 - Discharge disposition: Skilled Nursing Facility (DC - External)    Destination     Service Provider Selected Services Address Phone Fax Patient Preferred    [facility]  Skilled Nursing [address] , [place] [address] [phone] --                       Demographic Summary     Row Name 08/06/22 1146       General Information    Admission Type inpatient    Arrived From home    Required Notices Provided Important Message from Medicare    Referral Source admission list    Reason for Consult discharge planning    Preferred Language English               Functional Status     Row Name 08/06/22 1151       Functional Status    Usual Activity Tolerance moderate    Current Activity Tolerance fair       Functional Status, IADL    Medications assistive person    Meal Preparation assistive person    Housekeeping assistive person    Laundry assistive person    Shopping assistive person                Psychosocial    No documentation.                Abuse/Neglect    No documentation.                Legal    No documentation.                Substance Abuse    No documentation.                Patient Forms    No documentation.                   Ngoc Moya

## 2022-08-06 NOTE — ED NOTES
".  Nursing report ED to floor  Helena Carmen  89 y.o.  female    HPI :   Chief Complaint   Patient presents with   • covid positive   • Cough   • dyspnea on exertion       Admitting doctor:   Rogelio Fay MD    Admitting diagnosis:   The primary encounter diagnosis was COVID-19 virus infection. Diagnoses of Generalized weakness and Hypoglycemia were also pertinent to this visit.    Code status:   Current Code Status     Date Active Code Status Order ID Comments User Context       Prior    Advance Care Planning Activity          Allergies:   Erythromycin, Cephalexin, Penicillins, and Sulfa antibiotics    Intake and Output  No intake or output data in the 24 hours ending 08/06/22 0236    Weight:   There were no vitals filed for this visit.    Most recent vitals:   Vitals:    08/05/22 2139   BP: 169/77   Pulse: 59   Resp: 18   Temp: 97.5 °F (36.4 °C)   TempSrc: Tympanic   SpO2: 100%   Height: 144.8 cm (57\")       Active LDAs/IV Access:   Lines, Drains & Airways     Active LDAs     Name Placement date Placement time Site Days    Peripheral IV 08/05/22 2340 Right Antecubital 08/05/22  2340  Antecubital  less than 1    External Urinary Catheter 07/30/22  1107  --  6                Labs (abnormal labs have a star):   Labs Reviewed   COMPREHENSIVE METABOLIC PANEL - Abnormal; Notable for the following components:       Result Value    Glucose 64 (*)     BUN 28 (*)     Creatinine 1.42 (*)     ALT (SGPT) 63 (*)     AST (SGOT) 35 (*)     eGFR 35.0 (*)     All other components within normal limits    Narrative:     GFR Normal >60  Chronic Kidney Disease <60  Kidney Failure <15     CBC WITH AUTO DIFFERENTIAL - Abnormal; Notable for the following components:    Platelets 98 (*)     All other components within normal limits   LACTIC ACID, PLASMA - Normal   POCT GLUCOSE FINGERSTICK - Normal   RESPIRATORY PANEL PCR W/ COVID-19 (SARS-COV-2) LATONIA/CHARLIE/ALEJANDRO/PAD/[facility] IN-HOUSE, NP SWAB IN UTM/VTP, 3-4 HR TAT   CBC AND " DIFFERENTIAL    Narrative:     The following orders were created for panel order CBC & Differential.  Procedure                               Abnormality         Status                     ---------                               -----------         ------                     CBC Auto Differential[[id number]]        Abnormal            Final result                 Please view results for these tests on the individual orders.       EKG:   No orders to display       Meds given in ED:   Medications   sodium chloride 0.9 % flush 10 mL (has no administration in time range)       Imaging results:  XR Chest 1 View    Result Date: 2022  No acute findings.  This report was finalized on 2022 12:26 AM by Dr. Paris Carlos M.D.        Ambulatory status:   -       Social issues:   Social History     Socioeconomic History   • Marital status:    Tobacco Use   • Smoking status: Former Smoker     Packs/day: 0.50     Quit date: 7/10/1969     Years since quittin.1   • Smokeless tobacco: Never Used   Substance and Sexual Activity   • Alcohol use: No     Comment: caffeine use - coffee 2 cups daily   • Drug use: No

## 2022-08-06 NOTE — PROGRESS NOTES
Patient had a bilateral lower extremity venous duplex. Scan completed and preliminary results of bilateral acute calf vein DVT called to Edie Morris RN.

## 2022-08-06 NOTE — PROGRESS NOTES
"[facility] Clinical Pharmacy Services: Enoxaparin Consult    Helena Carmen has a pharmacy consult to dose full-dose enoxaparin per Dr. Anguiano's request.     Indication: DVT/PE (active thrombosis)  Home Anticoagulation: none    Relevant clinical data and objective history reviewed:  89 y.o. female 144.8 cm (57\") 72.1 kg (158 lb 15.2 oz)   Body mass index is 34.4 kg/m².   Results from last 7 days   Lab Units 08/06/22  0415   PLATELETS 10*3/mm3 89*     Estimated Creatinine Clearance: 23.3 mL/min (A) (by C-G formula based on SCr of 1.39 mg/dL (H)).    Assessment/Plan    Patient previously on prophylactic Lovenox and received 40 mg SQ today at 1343. Will order a 30 mg dose to be given now (total dose 70 mg) and follow with Lovenox 70 mg (1mg/kg) subcutaneous every 24 hours beginning tomorrow afternoon. Lovenox adjusted for renal function - CrCl 23.3 mL/min. Consult order will be discontinued but pharmacy will continue to follow.     Shivam Dove, [facility]  Clinical Pharmacist    "

## 2022-08-07 ENCOUNTER — APPOINTMENT (OUTPATIENT)
Dept: CT IMAGING | Facility: HOSPITAL | Age: 87
End: 2022-08-07

## 2022-08-07 PROBLEM — I82.409 ACUTE DVT (DEEP VENOUS THROMBOSIS) (HCC): Status: ACTIVE | Noted: 2022-08-07

## 2022-08-07 LAB
ALBUMIN SERPL-MCNC: 3.2 G/DL (ref 3.5–5.2)
ALBUMIN/GLOB SERPL: 1.2 G/DL
ALP SERPL-CCNC: 61 U/L (ref 39–117)
ALT SERPL W P-5'-P-CCNC: 38 U/L (ref 1–33)
ANION GAP SERPL CALCULATED.3IONS-SCNC: 13.9 MMOL/L (ref 5–15)
AST SERPL-CCNC: 18 U/L (ref 1–32)
BASOPHILS # BLD AUTO: 0 10*3/MM3 (ref 0–0.2)
BASOPHILS NFR BLD AUTO: 0 % (ref 0–1.5)
BILIRUB SERPL-MCNC: 0.4 MG/DL (ref 0–1.2)
BUN SERPL-MCNC: 27 MG/DL (ref 8–23)
BUN/CREAT SERPL: 22 (ref 7–25)
CALCIUM SPEC-SCNC: 8.3 MG/DL (ref 8.2–9.6)
CHLORIDE SERPL-SCNC: 102 MMOL/L (ref 98–107)
CO2 SERPL-SCNC: 19.1 MMOL/L (ref 22–29)
CREAT SERPL-MCNC: 1.23 MG/DL (ref 0.57–1)
CRP SERPL-MCNC: 1.21 MG/DL (ref 0–0.5)
D DIMER PPP FEU-MCNC: 1.46 MCGFEU/ML (ref 0–0.49)
DEPRECATED RDW RBC AUTO: 46 FL (ref 37–54)
EGFRCR SERPLBLD CKD-EPI 2021: 41.6 ML/MIN/1.73
EOSINOPHIL # BLD AUTO: 0 10*3/MM3 (ref 0–0.4)
EOSINOPHIL NFR BLD AUTO: 0 % (ref 0.3–6.2)
ERYTHROCYTE [DISTWIDTH] IN BLOOD BY AUTOMATED COUNT: 14.2 % (ref 12.3–15.4)
GLOBULIN UR ELPH-MCNC: 2.6 GM/DL
GLUCOSE BLDC GLUCOMTR-MCNC: 337 MG/DL (ref 70–130)
GLUCOSE BLDC GLUCOMTR-MCNC: 436 MG/DL (ref 70–130)
GLUCOSE BLDC GLUCOMTR-MCNC: 444 MG/DL (ref 70–130)
GLUCOSE BLDC GLUCOMTR-MCNC: 462 MG/DL (ref 70–130)
GLUCOSE SERPL-MCNC: 386 MG/DL (ref 65–99)
HCT VFR BLD AUTO: 39.6 % (ref 34–46.6)
HGB BLD-MCNC: 12.4 G/DL (ref 12–15.9)
LYMPHOCYTES # BLD AUTO: 0.65 10*3/MM3 (ref 0.7–3.1)
LYMPHOCYTES NFR BLD AUTO: 11.9 % (ref 19.6–45.3)
MCH RBC QN AUTO: 28.1 PG (ref 26.6–33)
MCHC RBC AUTO-ENTMCNC: 31.3 G/DL (ref 31.5–35.7)
MCV RBC AUTO: 89.8 FL (ref 79–97)
MONOCYTES # BLD AUTO: 0.14 10*3/MM3 (ref 0.1–0.9)
MONOCYTES NFR BLD AUTO: 2.6 % (ref 5–12)
NEUTROPHILS NFR BLD AUTO: 4.63 10*3/MM3 (ref 1.7–7)
NEUTROPHILS NFR BLD AUTO: 84.8 % (ref 42.7–76)
NT-PROBNP SERPL-MCNC: 3939 PG/ML (ref 0–1800)
PLATELET # BLD AUTO: 86 10*3/MM3 (ref 140–450)
PMV BLD AUTO: 12 FL (ref 6–12)
POTASSIUM SERPL-SCNC: 4.3 MMOL/L (ref 3.5–5.2)
PROCALCITONIN SERPL-MCNC: 0.04 NG/ML (ref 0–0.25)
PROT SERPL-MCNC: 5.8 G/DL (ref 6–8.5)
QT INTERVAL: 387 MS
RBC # BLD AUTO: 4.41 10*6/MM3 (ref 3.77–5.28)
SODIUM SERPL-SCNC: 135 MMOL/L (ref 136–145)
TROPONIN T SERPL-MCNC: 0.01 NG/ML (ref 0–0.03)
WBC NRBC COR # BLD: 5.46 10*3/MM3 (ref 3.4–10.8)

## 2022-08-07 PROCEDURE — 71250 CT THORAX DX C-: CPT

## 2022-08-07 PROCEDURE — 93010 ELECTROCARDIOGRAM REPORT: CPT | Performed by: INTERNAL MEDICINE

## 2022-08-07 PROCEDURE — 85379 FIBRIN DEGRADATION QUANT: CPT | Performed by: INTERNAL MEDICINE

## 2022-08-07 PROCEDURE — 63710000001 INSULIN LISPRO (HUMAN) PER 5 UNITS: Performed by: NURSE PRACTITIONER

## 2022-08-07 PROCEDURE — 84484 ASSAY OF TROPONIN QUANT: CPT | Performed by: HOSPITALIST

## 2022-08-07 PROCEDURE — 80053 COMPREHEN METABOLIC PANEL: CPT | Performed by: INTERNAL MEDICINE

## 2022-08-07 PROCEDURE — 63710000001 INSULIN LISPRO (HUMAN) PER 5 UNITS: Performed by: INTERNAL MEDICINE

## 2022-08-07 PROCEDURE — 85025 COMPLETE CBC W/AUTO DIFF WBC: CPT | Performed by: INTERNAL MEDICINE

## 2022-08-07 PROCEDURE — 83880 ASSAY OF NATRIURETIC PEPTIDE: CPT | Performed by: INTERNAL MEDICINE

## 2022-08-07 PROCEDURE — 82962 GLUCOSE BLOOD TEST: CPT

## 2022-08-07 PROCEDURE — 84145 PROCALCITONIN (PCT): CPT | Performed by: INTERNAL MEDICINE

## 2022-08-07 PROCEDURE — 25010000002 ENOXAPARIN PER 10 MG: Performed by: INTERNAL MEDICINE

## 2022-08-07 PROCEDURE — 93005 ELECTROCARDIOGRAM TRACING: CPT | Performed by: NURSE PRACTITIONER

## 2022-08-07 PROCEDURE — 86140 C-REACTIVE PROTEIN: CPT | Performed by: INTERNAL MEDICINE

## 2022-08-07 PROCEDURE — 94799 UNLISTED PULMONARY SVC/PX: CPT

## 2022-08-07 RX ORDER — INSULIN LISPRO 100 [IU]/ML
0-14 INJECTION, SOLUTION INTRAVENOUS; SUBCUTANEOUS
Status: DISCONTINUED | OUTPATIENT
Start: 2022-08-07 | End: 2022-08-10 | Stop reason: HOSPADM

## 2022-08-07 RX ORDER — HYDRALAZINE HYDROCHLORIDE 20 MG/ML
10 INJECTION INTRAMUSCULAR; INTRAVENOUS EVERY 6 HOURS PRN
Status: DISCONTINUED | OUTPATIENT
Start: 2022-08-07 | End: 2022-08-10 | Stop reason: HOSPADM

## 2022-08-07 RX ORDER — FLUTICASONE PROPIONATE 50 MCG
2 SPRAY, SUSPENSION (ML) NASAL DAILY
Status: DISCONTINUED | OUTPATIENT
Start: 2022-08-07 | End: 2022-08-10 | Stop reason: HOSPADM

## 2022-08-07 RX ORDER — GABAPENTIN 300 MG/1
300 CAPSULE ORAL 2 TIMES DAILY
Status: DISCONTINUED | OUTPATIENT
Start: 2022-08-07 | End: 2022-08-10 | Stop reason: HOSPADM

## 2022-08-07 RX ORDER — LORAZEPAM 0.5 MG/1
0.5 TABLET ORAL EVERY 12 HOURS PRN
Status: DISCONTINUED | OUTPATIENT
Start: 2022-08-07 | End: 2022-08-10 | Stop reason: HOSPADM

## 2022-08-07 RX ADMIN — GABAPENTIN 300 MG: 300 CAPSULE ORAL at 21:20

## 2022-08-07 RX ADMIN — METOPROLOL TARTRATE 12.5 MG: 25 TABLET ORAL at 09:16

## 2022-08-07 RX ADMIN — FAMOTIDINE 10 MG: 20 TABLET ORAL at 08:52

## 2022-08-07 RX ADMIN — INSULIN LISPRO 5 UNITS: 100 INJECTION, SOLUTION INTRAVENOUS; SUBCUTANEOUS at 08:49

## 2022-08-07 RX ADMIN — TAMSULOSIN HYDROCHLORIDE 0.4 MG: 0.4 CAPSULE ORAL at 08:52

## 2022-08-07 RX ADMIN — BENZONATATE 100 MG: 100 CAPSULE ORAL at 21:20

## 2022-08-07 RX ADMIN — FAMOTIDINE 10 MG: 20 TABLET ORAL at 21:19

## 2022-08-07 RX ADMIN — ENOXAPARIN SODIUM 70 MG: 100 INJECTION SUBCUTANEOUS at 14:42

## 2022-08-07 RX ADMIN — LORAZEPAM 0.5 MG: 0.5 TABLET ORAL at 21:20

## 2022-08-07 RX ADMIN — ROSUVASTATIN CALCIUM 5 MG: 5 TABLET, FILM COATED ORAL at 08:51

## 2022-08-07 RX ADMIN — SODIUM BICARBONATE 650 MG: 650 TABLET ORAL at 21:20

## 2022-08-07 RX ADMIN — AMLODIPINE BESYLATE 10 MG: 10 TABLET ORAL at 08:51

## 2022-08-07 RX ADMIN — GUAIFENESIN 1200 MG: 600 TABLET, EXTENDED RELEASE ORAL at 21:20

## 2022-08-07 RX ADMIN — GABAPENTIN 300 MG: 300 CAPSULE ORAL at 09:16

## 2022-08-07 RX ADMIN — FLUTICASONE PROPIONATE 2 SPRAY: 50 SPRAY, METERED NASAL at 14:42

## 2022-08-07 RX ADMIN — Medication 10 ML: at 21:21

## 2022-08-07 RX ADMIN — GUAIFENESIN 1200 MG: 600 TABLET, EXTENDED RELEASE ORAL at 08:51

## 2022-08-07 RX ADMIN — MONTELUKAST SODIUM 10 MG: 10 TABLET, FILM COATED ORAL at 21:20

## 2022-08-07 RX ADMIN — Medication 10 ML: at 08:52

## 2022-08-07 RX ADMIN — INSULIN LISPRO 7 UNITS: 100 INJECTION, SOLUTION INTRAVENOUS; SUBCUTANEOUS at 12:08

## 2022-08-07 RX ADMIN — INSULIN LISPRO 14 UNITS: 100 INJECTION, SOLUTION INTRAVENOUS; SUBCUTANEOUS at 18:12

## 2022-08-07 RX ADMIN — DEXAMETHASONE 6 MG: 6 TABLET ORAL at 08:52

## 2022-08-07 RX ADMIN — INSULIN GLARGINE-YFGN 15 UNITS: 100 INJECTION, SOLUTION SUBCUTANEOUS at 21:26

## 2022-08-07 RX ADMIN — LEVOTHYROXINE SODIUM 25 MCG: 0.03 TABLET ORAL at 06:21

## 2022-08-07 RX ADMIN — METOPROLOL TARTRATE 12.5 MG: 25 TABLET ORAL at 21:20

## 2022-08-07 RX ADMIN — SODIUM BICARBONATE 650 MG: 650 TABLET ORAL at 08:52

## 2022-08-07 RX ADMIN — INSULIN GLARGINE-YFGN 15 UNITS: 100 INJECTION, SOLUTION SUBCUTANEOUS at 12:00

## 2022-08-07 RX ADMIN — LORAZEPAM 0.5 MG: 0.5 TABLET ORAL at 09:16

## 2022-08-07 NOTE — PLAN OF CARE
Goal Outcome Evaluation:   Pt b/p slightly elevated. Pt has dry cough and meds given. Pt's 02 decreased during sleep 86% and had to have 02 2l nc applied, this happened 2x. IS at bsd and pt is compliant with it. Pt voiding well with pure wick in place. Bladder scanned showed 194.               [Joint Pain] : joint pain [Negative] : Heme/Lymph

## 2022-08-07 NOTE — PROGRESS NOTES
" LOS: 1 day     Name: Helena Carmen  Age: 89 y.o.  Sex: female  :  1931  MRN: [id number]         Primary Care Physician: Mariah Hickman MD    Subjective   Subjective  Had increased anxiety this morning with increased respiratory rate.  Improved after given a dose of oral Ativan which she takes at home.  Has lost her voice.  Still with some shortness of breath.  Not as congested today.  Remains weak and deconditioned.  Complains of nasal congestion    Objective   Vital Signs  Temp:  [96.8 °F (36 °C)-97.4 °F (36.3 °C)] 96.8 °F (36 °C)  Heart Rate:  [60-85] 85  Resp:  [18-32] 32  BP: (128-186)/() 182/76  Body mass index is 34.4 kg/m².    Objective:  General Appearance:  Comfortable, in no acute distress and ill-appearing.    Vital signs: (most recent): Blood pressure (!) 182/76, pulse 85, temperature 96.8 °F (36 °C), temperature source Oral, resp. rate (!) 32, height 144.8 cm (57\"), weight 72.1 kg (158 lb 15.2 oz), SpO2 100 %.    Lungs:  Normal effort and normal respiratory rate.  There are decreased breath sounds.  (Does not sound as congested today and her chest)  Heart: Normal rate.  Regular rhythm.    Abdomen: Abdomen is soft.  Bowel sounds are normal.   There is no abdominal tenderness.     Extremities: There is dependent edema.  There is no local swelling.  (1+ pitting bilateral lower extremity edema)  Neurological: Patient is alert and oriented to person, place and time.    Skin:  Warm and dry.              Results Review:       I reviewed the patient's new clinical results.    Results from last 7 days   Lab Units 22  0454 22  0415 22  2340 22  0633 22  1017   WBC 10*3/mm3 5.46 7.78 8.85 4.52 3.79   HEMOGLOBIN g/dL 12.4 14.2 13.9 12.6 12.7   PLATELETS 10*3/mm3 86* 89* 98* 82* 74*     Results from last 7 days   Lab Units 22  0454 22  0415 22  2340 22  0633 22  1017   SODIUM mmol/L 135* 140 138 136 136   POTASSIUM mmol/L 4.3 3.9 3.7 " 4.1 4.2   CHLORIDE mmol/L 102 103 100 103 103   CO2 mmol/L 19.1* 23.0 23.4 18.6* 16.0*   BUN mg/dL 27* 27* 28* 33* 28*   CREATININE mg/dL 1.23* 1.39* 1.42* 1.24* 1.32*   CALCIUM mg/dL 8.3 8.7 8.8 8.8 9.1   GLUCOSE mg/dL 386* 93 64* 243* 351*     Results from last 7 days   Lab Units 08/06/22  0415   INR  1.13*             Scheduled Meds:   amLODIPine, 10 mg, Oral, Daily  benzonatate, 100 mg, Oral, Nightly  dexamethasone, 6 mg, Oral, Daily With Breakfast  enoxaparin, 1 mg/kg, Subcutaneous, Q24H  famotidine, 10 mg, Oral, BID  gabapentin, 300 mg, Oral, BID  guaiFENesin, 1,200 mg, Oral, Q12H  insulin glargine, 15 Units, Subcutaneous, Q12H  insulin lispro, 0-7 Units, Subcutaneous, TID AC  levothyroxine, 25 mcg, Oral, Q AM  metoprolol tartrate, 12.5 mg, Oral, BID  montelukast, 10 mg, Oral, Nightly  rosuvastatin, 5 mg, Oral, Daily  sodium bicarbonate, 650 mg, Oral, BID  sodium chloride, 10 mL, Intravenous, Q12H  tamsulosin, 0.4 mg, Oral, Daily      PRN Meds:   •  acetaminophen **OR** acetaminophen **OR** acetaminophen  •  benzocaine-menthol  •  dextrose  •  dextrose  •  glucagon (human recombinant)  •  hydrALAZINE  •  LORazepam  •  nitroglycerin  •  ondansetron  •  [COMPLETED] Insert peripheral IV **AND** sodium chloride  •  sodium chloride  Continuous Infusions:       Assessment & Plan   Active Hospital Problems    Diagnosis  POA   • **COVID-19 virus infection [U07.1]  Yes   • Anemia, chronic disease [D63.8]  Yes   • Chronic heart failure with preserved ejection fraction (HCC) [I50.32]  Yes   • Type 2 diabetes mellitus with hyperglycemia (HCC) [E11.65]  Yes   • Malignant neoplasm of uterus (HCC) [C55]  Yes   • Pulmonary hypertension (HCC) [I27.20]  Yes   • Paroxysmal atrial fibrillation (HCC) [I48.0]  Yes   • Chronic kidney disease [N18.9]  Yes      Resolved Hospital Problems   No resolved problems to display.       Assessment & Plan    -Continue supportive care and Decadron for recent COVID-19 infection.  We will continue  the steroid for few 24 to 48 hours more and then consider switching back to her home dose of prednisone.  Oxygen saturations are excellent on room air at 99 to 100%  -Lower extremity Doppler yesterday revealed DVTs and she was started on full dose Lovenox.  Possible she has pulmonary embolism as well but unable to get contrasted CT scan.  We will consider V/Q  -Still waiting on noncontrasted CT of the chest to evaluate for any complicating features of recent COVID-pneumonia  -No evidence of bacterial pneumonia on labs  -Continue aggressive pulmonary hygiene.  She sounds much more clear today on respiratory exam  -Blood sugars very much uncontrolled in the 300s.  Start Lantus 15 units twice daily and continue sliding scale.  Hold any outpatient oral diabetic medications for now  -Flonase for her nasal congestion  -Blood pressure remains elevated and will add as needed IV hydralazine to help temporize.  -Thrombocytopenia noted.  Relatively stable thus far and compared to labs from recent hospitalization but will have to monitor closely while on the Lovenox.  -Renal function stable at baseline  -Discussed with patient, daughter, son, and nurse      I wore full protective equipment throughout the patient encounter including eye protection and facemask.  Hand hygiene was performed before donning protective equipment and after removal when leaving the room.    Ovidio Anguiano MD  [facility]  08/07/22  11:57 EDT

## 2022-08-07 NOTE — OUTREACH NOTE
COVID-19 Week 1 Survey    Flowsheet Row Responses   [facility] facility patient discharged from? [place]   Does the patient have one of the following disease processes/diagnoses(primary or secondary)? COVID-19   COVID-19 underlying condition? CHF   Week 1 Call successful? No   Revoke Readmitted   Discharge diagnosis Covid 19          ASMITA NIELSON - Registered Nurse

## 2022-08-07 NOTE — PLAN OF CARE
Goal Outcome Evaluation:  Plan of Care Reviewed With: patient        Progress: improving  Outcome Evaluation: AOx4. Pt had incident at approximately 0800 where she voiced needing help. Upon assessing pt, she voiced having pain along her chest area. Breathing was labored and quick at 32 breaths/min. O2 sats were 98% on RA. BP a bit high at a systolic of 180. Pt appeared to be in panic and could not indicate exactly what was going on. Physician informed and medication orders placed. EKG obtained and it indicated NSR. After PO ativan given, pt appeared much more relaxed and respirations were normal. CT of chest obtained today, results in chart. VSS. Safety maitained.

## 2022-08-08 ENCOUNTER — APPOINTMENT (OUTPATIENT)
Dept: NUCLEAR MEDICINE | Facility: HOSPITAL | Age: 87
End: 2022-08-08

## 2022-08-08 LAB
ALBUMIN SERPL-MCNC: 3.2 G/DL (ref 3.5–5.2)
ALBUMIN/GLOB SERPL: 1.5 G/DL
ALP SERPL-CCNC: 53 U/L (ref 39–117)
ALT SERPL W P-5'-P-CCNC: 32 U/L (ref 1–33)
ANION GAP SERPL CALCULATED.3IONS-SCNC: 13 MMOL/L (ref 5–15)
AST SERPL-CCNC: 13 U/L (ref 1–32)
BASOPHILS # BLD AUTO: 0 10*3/MM3 (ref 0–0.2)
BASOPHILS NFR BLD AUTO: 0 % (ref 0–1.5)
BILIRUB SERPL-MCNC: 0.3 MG/DL (ref 0–1.2)
BUN SERPL-MCNC: 30 MG/DL (ref 8–23)
BUN/CREAT SERPL: 24.4 (ref 7–25)
CALCIUM SPEC-SCNC: 8.7 MG/DL (ref 8.2–9.6)
CHLORIDE SERPL-SCNC: 103 MMOL/L (ref 98–107)
CO2 SERPL-SCNC: 21 MMOL/L (ref 22–29)
CREAT SERPL-MCNC: 1.23 MG/DL (ref 0.57–1)
CRP SERPL-MCNC: 0.6 MG/DL (ref 0–0.5)
DEPRECATED RDW RBC AUTO: 44.2 FL (ref 37–54)
EGFRCR SERPLBLD CKD-EPI 2021: 41.6 ML/MIN/1.73
EOSINOPHIL # BLD AUTO: 0 10*3/MM3 (ref 0–0.4)
EOSINOPHIL NFR BLD AUTO: 0 % (ref 0.3–6.2)
ERYTHROCYTE [DISTWIDTH] IN BLOOD BY AUTOMATED COUNT: 14 % (ref 12.3–15.4)
GLOBULIN UR ELPH-MCNC: 2.2 GM/DL
GLUCOSE BLDC GLUCOMTR-MCNC: 341 MG/DL (ref 70–130)
GLUCOSE BLDC GLUCOMTR-MCNC: 342 MG/DL (ref 70–130)
GLUCOSE BLDC GLUCOMTR-MCNC: 351 MG/DL (ref 70–130)
GLUCOSE BLDC GLUCOMTR-MCNC: 364 MG/DL (ref 70–130)
GLUCOSE SERPL-MCNC: 387 MG/DL (ref 65–99)
HCT VFR BLD AUTO: 36.7 % (ref 34–46.6)
HGB BLD-MCNC: 11.8 G/DL (ref 12–15.9)
LYMPHOCYTES # BLD AUTO: 0.7 10*3/MM3 (ref 0.7–3.1)
LYMPHOCYTES NFR BLD AUTO: 9.9 % (ref 19.6–45.3)
MCH RBC QN AUTO: 28.3 PG (ref 26.6–33)
MCHC RBC AUTO-ENTMCNC: 32.2 G/DL (ref 31.5–35.7)
MCV RBC AUTO: 88 FL (ref 79–97)
MONOCYTES # BLD AUTO: 0.3 10*3/MM3 (ref 0.1–0.9)
MONOCYTES NFR BLD AUTO: 4.2 % (ref 5–12)
NEUTROPHILS NFR BLD AUTO: 6 10*3/MM3 (ref 1.7–7)
NEUTROPHILS NFR BLD AUTO: 84.6 % (ref 42.7–76)
PLATELET # BLD AUTO: 110 10*3/MM3 (ref 140–450)
PMV BLD AUTO: 10.8 FL (ref 6–12)
POTASSIUM SERPL-SCNC: 4.6 MMOL/L (ref 3.5–5.2)
PROT SERPL-MCNC: 5.4 G/DL (ref 6–8.5)
RBC # BLD AUTO: 4.17 10*6/MM3 (ref 3.77–5.28)
SODIUM SERPL-SCNC: 137 MMOL/L (ref 136–145)
WBC NRBC COR # BLD: 7.09 10*3/MM3 (ref 3.4–10.8)

## 2022-08-08 PROCEDURE — 94799 UNLISTED PULMONARY SVC/PX: CPT

## 2022-08-08 PROCEDURE — 78580 LUNG PERFUSION IMAGING: CPT

## 2022-08-08 PROCEDURE — 0 TECHNETIUM ALBUMIN AGGREGATED: Performed by: INTERNAL MEDICINE

## 2022-08-08 PROCEDURE — 82962 GLUCOSE BLOOD TEST: CPT

## 2022-08-08 PROCEDURE — 94761 N-INVAS EAR/PLS OXIMETRY MLT: CPT

## 2022-08-08 PROCEDURE — 80053 COMPREHEN METABOLIC PANEL: CPT | Performed by: INTERNAL MEDICINE

## 2022-08-08 PROCEDURE — A9540 TC99M MAA: HCPCS | Performed by: INTERNAL MEDICINE

## 2022-08-08 PROCEDURE — 63710000001 INSULIN LISPRO (HUMAN) PER 5 UNITS: Performed by: INTERNAL MEDICINE

## 2022-08-08 PROCEDURE — 97166 OT EVAL MOD COMPLEX 45 MIN: CPT

## 2022-08-08 PROCEDURE — 85025 COMPLETE CBC W/AUTO DIFF WBC: CPT | Performed by: INTERNAL MEDICINE

## 2022-08-08 PROCEDURE — 86140 C-REACTIVE PROTEIN: CPT | Performed by: INTERNAL MEDICINE

## 2022-08-08 PROCEDURE — 97535 SELF CARE MNGMENT TRAINING: CPT

## 2022-08-08 RX ORDER — HYDRALAZINE HYDROCHLORIDE 25 MG/1
25 TABLET, FILM COATED ORAL EVERY 8 HOURS SCHEDULED
Status: DISCONTINUED | OUTPATIENT
Start: 2022-08-08 | End: 2022-08-09

## 2022-08-08 RX ORDER — PREDNISONE 10 MG/1
10 TABLET ORAL DAILY
Status: DISCONTINUED | OUTPATIENT
Start: 2022-08-09 | End: 2022-08-09

## 2022-08-08 RX ADMIN — GABAPENTIN 300 MG: 300 CAPSULE ORAL at 09:23

## 2022-08-08 RX ADMIN — SODIUM BICARBONATE 650 MG: 650 TABLET ORAL at 09:24

## 2022-08-08 RX ADMIN — FLUTICASONE PROPIONATE 2 SPRAY: 50 SPRAY, METERED NASAL at 09:24

## 2022-08-08 RX ADMIN — METOPROLOL TARTRATE 12.5 MG: 25 TABLET ORAL at 23:16

## 2022-08-08 RX ADMIN — TAMSULOSIN HYDROCHLORIDE 0.4 MG: 0.4 CAPSULE ORAL at 09:24

## 2022-08-08 RX ADMIN — FAMOTIDINE 10 MG: 20 TABLET ORAL at 23:16

## 2022-08-08 RX ADMIN — DEXAMETHASONE 6 MG: 6 TABLET ORAL at 09:24

## 2022-08-08 RX ADMIN — APIXABAN 5 MG: 5 TABLET, FILM COATED ORAL at 13:35

## 2022-08-08 RX ADMIN — Medication 10 ML: at 09:24

## 2022-08-08 RX ADMIN — BENZONATATE 100 MG: 100 CAPSULE ORAL at 23:12

## 2022-08-08 RX ADMIN — KIT FOR THE PREPARATION OF TECHNETIUM TC 99M ALBUMIN AGGREGATED 1 DOSE: 2.5 INJECTION, POWDER, FOR SOLUTION INTRAVENOUS at 14:00

## 2022-08-08 RX ADMIN — INSULIN GLARGINE-YFGN 10 UNITS: 100 INJECTION, SOLUTION SUBCUTANEOUS at 13:36

## 2022-08-08 RX ADMIN — ACETAMINOPHEN 650 MG: 325 TABLET, FILM COATED ORAL at 23:12

## 2022-08-08 RX ADMIN — FAMOTIDINE 10 MG: 20 TABLET ORAL at 09:23

## 2022-08-08 RX ADMIN — INSULIN GLARGINE-YFGN 15 UNITS: 100 INJECTION, SOLUTION SUBCUTANEOUS at 09:36

## 2022-08-08 RX ADMIN — MONTELUKAST SODIUM 10 MG: 10 TABLET, FILM COATED ORAL at 23:12

## 2022-08-08 RX ADMIN — APIXABAN 5 MG: 5 TABLET, FILM COATED ORAL at 23:13

## 2022-08-08 RX ADMIN — GUAIFENESIN 1200 MG: 600 TABLET, EXTENDED RELEASE ORAL at 23:13

## 2022-08-08 RX ADMIN — ROSUVASTATIN CALCIUM 5 MG: 5 TABLET, FILM COATED ORAL at 09:24

## 2022-08-08 RX ADMIN — METOPROLOL TARTRATE 12.5 MG: 25 TABLET ORAL at 09:23

## 2022-08-08 RX ADMIN — INSULIN GLARGINE-YFGN 20 UNITS: 100 INJECTION, SOLUTION SUBCUTANEOUS at 23:21

## 2022-08-08 RX ADMIN — INSULIN LISPRO 10 UNITS: 100 INJECTION, SOLUTION INTRAVENOUS; SUBCUTANEOUS at 17:43

## 2022-08-08 RX ADMIN — SODIUM BICARBONATE 650 MG: 650 TABLET ORAL at 23:12

## 2022-08-08 RX ADMIN — INSULIN LISPRO 12 UNITS: 100 INJECTION, SOLUTION INTRAVENOUS; SUBCUTANEOUS at 09:23

## 2022-08-08 RX ADMIN — HYDRALAZINE HYDROCHLORIDE 25 MG: 25 TABLET, FILM COATED ORAL at 23:13

## 2022-08-08 RX ADMIN — INSULIN LISPRO 10 UNITS: 100 INJECTION, SOLUTION INTRAVENOUS; SUBCUTANEOUS at 13:03

## 2022-08-08 RX ADMIN — GUAIFENESIN 1200 MG: 600 TABLET, EXTENDED RELEASE ORAL at 09:24

## 2022-08-08 RX ADMIN — HYDRALAZINE HYDROCHLORIDE 25 MG: 25 TABLET, FILM COATED ORAL at 13:35

## 2022-08-08 RX ADMIN — Medication 10 ML: at 23:13

## 2022-08-08 RX ADMIN — GABAPENTIN 300 MG: 300 CAPSULE ORAL at 23:16

## 2022-08-08 RX ADMIN — LORAZEPAM 0.5 MG: 0.5 TABLET ORAL at 23:16

## 2022-08-08 RX ADMIN — LORAZEPAM 0.5 MG: 0.5 TABLET ORAL at 09:32

## 2022-08-08 RX ADMIN — AMLODIPINE BESYLATE 10 MG: 10 TABLET ORAL at 09:23

## 2022-08-08 NOTE — CASE MANAGEMENT/SOCIAL WORK
Continued Stay Note  [facility]     Patient Name: Helena Carmen  MRN: [id number]  Today's Date: 8/8/2022    Admit Date: 8/5/2022     Discharge Plan     Row Name 08/08/22 1716       Plan    Plan Return home with 24/7 family assist and [facility]    Patient/Family in Agreement with Plan yes    Plan Comments Due to isolation status, CCP met with daughter Radha in [facility] to discuss discharge planning. Radha confirms plans to return home with [facility] and 24/7 family assist. She denies any discharge planning needs at this time. Olga SCHOFIELD RN/CCP               Discharge Codes    No documentation.               Expected Discharge Date and Time     Expected Discharge Date Expected Discharge Time    Aug 8, 2022             Ngoc Moya

## 2022-08-08 NOTE — PROGRESS NOTES
"Nutrition Services    Patient Name:  Helena Carmen  YOB: 1931  MRN: [id number]  Admit Date:  8/5/2022      PROGRESS NOTE    Comments: MST 3, Pt known to services due to just being IP on 8/2 for the same diagnosis. She is eating well (% x 4 meals), has not lost any weight, is weaning off oxygen, and doing better although she is fatigued and ill appearing. RD will continue to follow     Encounter Information         Reason for Encounter MST 3     Admitting Diagnosis COVID-19 virus      Current Nutrition Orders & Evaluation of Intake       Oral Nutrition     Current PO Diet Diet Regular; Consistent Carbohydrate   Supplement    PO Evaluation     Trending % PO Intake % x 4 meals    --  Anthropometrics          Height    Weight Height: 144.8 cm (57\")  Weight: 72.1 kg (158 lb 15.2 oz) (08/06/22 0412)    BMI kg/m2 Body mass index is 34.4 kg/m².    Weight trend Stable      Labs        Pertinent Labs Reviewed, listed below     Results from last 7 days   Lab Units 08/08/22 0438 08/07/22 0454 08/06/22 0415 08/05/22  2340   SODIUM mmol/L 137 135* 140 138   POTASSIUM mmol/L 4.6 4.3 3.9 3.7   CHLORIDE mmol/L 103 102 103 100   CO2 mmol/L 21.0* 19.1* 23.0 23.4   BUN mg/dL 30* 27* 27* 28*   CREATININE mg/dL 1.23* 1.23* 1.39* 1.42*   CALCIUM mg/dL 8.7 8.3 8.7 8.8   BILIRUBIN mg/dL 0.3 0.4  --  0.4   ALK PHOS U/L 53 61  --  64   ALT (SGPT) U/L 32 38*  --  63*   AST (SGOT) U/L 13 18  --  35*   GLUCOSE mg/dL 387* 386* 93 64*     Results from last 7 days   Lab Units 08/08/22 0438 08/07/22 0454 08/06/22 0415   MAGNESIUM mg/dL  --   --  2.0   HEMOGLOBIN g/dL 11.8*   < > 14.2   HEMATOCRIT % 36.7   < > 44.0   WBC 10*3/mm3 7.09   < > 7.78    < > = values in this interval not displayed.     Results from last 7 days   Lab Units 08/08/22  0438 08/07/22  0454 08/06/22  0415 08/05/22  2340 08/02/22  0633   INR   --   --  1.13*  --   --    PLATELETS 10*3/mm3 110* 86* 89* 98* 82*     COVID19   Date Value Ref Range " Status   08/06/2022 Detected (C) Not Detected - Ref. Range Final     Lab Results   Component Value Date    HGBA1C 6.70 (H) 03/04/2022          Medications            Scheduled Medications amLODIPine, 10 mg, Oral, Daily  benzonatate, 100 mg, Oral, Nightly  enoxaparin, 1 mg/kg, Subcutaneous, Q24H  famotidine, 10 mg, Oral, BID  fluticasone, 2 spray, Each Nare, Daily  gabapentin, 300 mg, Oral, BID  guaiFENesin, 1,200 mg, Oral, Q12H  hydrALAZINE, 25 mg, Oral, Q8H  insulin glargine, 10 Units, Subcutaneous, Once  insulin glargine, 20 Units, Subcutaneous, Q12H  insulin lispro, 0-14 Units, Subcutaneous, TID AC  levothyroxine, 25 mcg, Oral, Q AM  metoprolol tartrate, 12.5 mg, Oral, BID  montelukast, 10 mg, Oral, Nightly  [START ON 8/9/2022] predniSONE, 10 mg, Oral, Daily  rosuvastatin, 5 mg, Oral, Daily  sodium bicarbonate, 650 mg, Oral, BID  sodium chloride, 10 mL, Intravenous, Q12H  tamsulosin, 0.4 mg, Oral, Daily        Infusions      PRN Medications •  acetaminophen **OR** acetaminophen **OR** acetaminophen  •  benzocaine-menthol  •  dextrose  •  dextrose  •  glucagon (human recombinant)  •  hydrALAZINE  •  LORazepam  •  nitroglycerin  •  ondansetron  •  [COMPLETED] Insert peripheral IV **AND** sodium chloride  •  sodium chloride     Physical Findings         Physical Appearance Ill appearing, alert, oriented, 1+ peripheral edema, 1L O2 NC    --   Gastrointestinal Last BM 8/2     Skin Skin intact      Intervention Goal        Intervention Goal(s) Improved nutrition related labs, Maintain intake and Maintain weight     Nutrition Intervention        RD Action Follow Tx Progress and Care plan reviewed     Nutrition Prescription         Diet Prescription Consistent carb    Supplement Prescription N/a    Prescription Ordered      Monitor/Evaluation        Monitor Per protocol     RD to follow up per protocol.  Electronically signed by:  EVAN CARTER RD  08/08/22 11:09 EDT

## 2022-08-08 NOTE — PLAN OF CARE
Goal Outcome Evaluation:  Plan of Care Reviewed With: patient        Progress: no change  Outcome Evaluation: Pt rested well this shift. Ativan given at bedtime no more c/o pain or anxiety this shift. Pt has water at bedside. Legally blind. Oriented x4. Pt BG around 400 this shift. Lantus started yesterday. Pt has a more productive cough. No O2 this shift. CTM, safety maintained.

## 2022-08-08 NOTE — SIGNIFICANT NOTE
08/08/22 1351   OTHER   Discipline physical therapist   Rehab Time/Intention   Session Not Performed patient unavailable for evaluation  (leaving floor for VQ scan, PT will follow up tomorrow)   Recommendation   PT - Next Appointment 08/09/22

## 2022-08-08 NOTE — THERAPY EVALUATION
Patient Name: Helena Carmen  : 1931    MRN: [id number]                              Today's Date: 2022       Admit Date: 2022    Visit Dx:     ICD-10-CM ICD-9-CM   1. COVID-19 virus infection  U07.1 079.89   2. Generalized weakness  R53.1 780.79   3. Hypoglycemia  E16.2 251.2     Patient Active Problem List   Diagnosis   • Aortic valve stenosis   • Fatigue   • MI (mitral incompetence)   • PVC (premature ventricular contraction)   • Legally blind   • Essential hypertension   • Hypertriglyceridemia   • Pulmonary hypertension (HCC)   • Paroxysmal atrial fibrillation (HCC)   • Anxiety   • Chronic kidney disease   • Chronic pain disorder   • Degeneration of lumbar intervertebral disc   • Type 2 diabetes mellitus with hyperglycemia (HCC)   • Esophageal reflux   • Gastroparesis   • Hiatal hernia   • Iatrogenic hypothyroidism   • Macular degeneration   • Malignant neoplasm of uterus (HCC)   • Osteoarthritis of knee   • Peripheral nerve disease   • Secondary hyperparathyroidism (HCC)   • Thrombocytopenia (HCC)   • Chronic heart failure with preserved ejection fraction (HCC)   • Other cirrhosis of liver (HCC)   • Hypothyroidism   • Nonrheumatic tricuspid valve regurgitation   • COVID-19   • Anemia, chronic disease   • Weakness generalized   • Cytokine release syndrome, grade 1   • Urine retention   • COVID-19 virus infection   • Acute DVT (deep venous thrombosis) (HCC)     Past Medical History:   Diagnosis Date   • Aortic valve stenosis     s/p tissue AVR   • Back pain    • CKD (chronic kidney disease)    • Colitis due to Clostridioides difficile 2022   • Diastolic dysfunction     Grade 2 per echocardiogram    • Diverticulosis    • Exertional shortness of breath    • Heart disease    • Hiatal hernia    • Hyperlipidemia    • Hypertension    • Hyperthyroidism    • Hypertriglyceridemia 2018   • Hypothyroidism    • Left ventricular hypertrophy    • Legally blind    • Liver disease    • Macular  degeneration    • Mitral regurgitation    • Osteoarthritis of hip    • Pancreatitis 01/26/2022   • Paroxysmal atrial fibrillation (HCC)    • Premature ventricular contractions    • Pulmonary hypertension (HCC)    • Renal insufficiency syndrome    • Type 2 diabetes mellitus (HCC)    • Uterine cancer (HCC)      Past Surgical History:   Procedure Laterality Date   • AORTIC VALVE REPAIR/REPLACEMENT     • CATARACT EXTRACTION      1970, 1999   • ENDOSCOPY  08/15/2014    no gross lesions in stomach/duodenum, erythrematous mucosa in stomach   • HYSTERECTOMY  2007   • STERNOTOMY        General Information     Row Name 08/08/22 1235          OT Time and Intention    Document Type evaluation  -     Mode of Treatment individual therapy;occupational therapy  -     Row Name 08/08/22 1235          General Information    Patient Profile Reviewed yes  -SALVADOR     Prior Level of Function independent:;transfer;all household mobility;min assist:;ADL's  -SALVADOR     Existing Precautions/Restrictions fall  -SALVADOR     Barriers to Rehab medically complex;previous functional deficit  -     Row Name 08/08/22 1235          Occupational Profile    Reason for Services/Referral (Occupational Profile) Pt is a 89 y/o F re-admitted after being treated for COVID 19, d/c home 7/30. Reportedly increasing weakness, fatigue, SOA and cough. US BLEs noted + for DVTs. On Lovenox since 8/7. PMH includes porcine AVR, CABRERA cirrhosis, DM 2, CKD, chronic vision loss/legally blind. Pt resides with family and has 24/7 care available. Pt uses RW for home mobility and short distances; she uses w/c for community mobility. Pt reports min A for ADLs at baseline.  -SALVADOR     Row Name 08/08/22 1235          Living Environment    People in Home child(edison), adult  -SALVADOR     Row Name 08/08/22 1235          Home Main Entrance    Number of Stairs, Main Entrance none  -SALVADOR     Row Name 08/08/22 1235          Stairs Within Home, Primary    Number of Stairs, Within Home, Primary none  -SALVADOR      [facility] Name 08/08/22 1235          Cognition    Orientation Status (Cognition) oriented x 3  -[facility] Name 08/08/22 1235          Safety Issues, Functional Mobility    Impairments Affecting Function (Mobility) balance;endurance/activity tolerance;strength;shortness of breath  -     Comment, Safety Issues/Impairments (Mobility) gait belt and non skid socks used for safety  -           User Key  (r) = Recorded By, (t) = Taken By, (c) = Cosigned By    Initials Name Provider Type     Aparna Goldman OT Occupational Therapist                 Mobility/ADL's     [facility] Name 08/08/22 1239          Bed Mobility    Bed Mobility supine-sit  -     Supine-Sit [place] (Bed Mobility) standby assist;supervision  -     Assistive Device (Bed Mobility) bed rails;head of bed elevated  -[facility] Name 08/08/22 1239          Transfers    Transfers bed-chair transfer  -     Bed-Chair [place] (Transfers) contact guard;minimum assist (75% patient effort);verbal cues  -[facility] Name 08/08/22 1239          Functional Mobility    Functional Mobility- Ind. Level not tested  -[facility] Name 08/08/22 1239          Activities of Daily Living    BADL Assessment/Intervention lower body dressing  -[facility] Name 08/08/22 1239          Lower Body Dressing Assessment/Training    [place] Level (Lower Body Dressing) maximum assist (25% patient effort);don;socks  -     Position (Lower Body Dressing) long sitting  -           User Key  (r) = Recorded By, (t) = Taken By, (c) = Cosigned By    Initials Name Provider Type    SALVADOR Aparna Goldman OT Occupational Therapist               Obj/Interventions     [facility] Name 08/08/22 1239          Sensory Assessment (Somatosensory)    Sensory Assessment (Somatosensory) sensation intact  -[facility] Name 08/08/22 1239          Vision Assessment/Intervention    Visual Impairment/Limitations legally blind  -[facility] Name 08/08/22 1239          Range of Motion Comprehensive     Comment, General Range of Motion BUEs AROM WFL  -[facility] Name 08/08/22 1239          Strength Comprehensive (MMT)    Comment, General Manual Muscle Testing (MMT) Assessment generalized weakness but [facility]  -[facility] Name 08/08/22 1239          Shoulder (Therapeutic Exercise)    Shoulder (Therapeutic Exercise) AROM (active range of motion)  -     Shoulder AROM (Therapeutic Exercise) bilateral;flexion;extension;aDduction;aBduction;supine;10 repetitions  -[facility] Name 08/08/22 1239          Motor Skills    Therapeutic Exercise shoulder  -[facility] Name 08/08/22 1239          Balance    Balance Assessment sitting static balance;sitting dynamic balance;standing static balance;standing dynamic balance  -SALVADOR     Static Sitting Balance supervision  -SALVADOR     Dynamic Sitting Balance standby assist  -SALVADOR     Position, Sitting Balance sitting edge of bed;unsupported  -SALVADOR     Static Standing Balance contact guard  -SALVADOR     Dynamic Standing Balance minimal assist  -SALVADOR     Position/Device Used, Standing Balance supported  -SALVADOR           User Key  (r) = Recorded By, (t) = Taken By, (c) = Cosigned By    Initials Name Provider Type    Aparna Matson OT Occupational Therapist               Goals/Plan     Row Name 08/08/22 1242          Transfer Goal 1 (OT)    Activity/Assistive Device (Transfer Goal 1, OT) transfers, all  -SALVADOR     [place] Level/Cues Needed (Transfer Goal 1, OT) modified independence  -SALVADOR     Time Frame (Transfer Goal 1, OT) short term goal (STG);2 weeks  -SALVADOR     Row Name 08/08/22 1242          Bathing Goal 1 (OT)    Activity/Device (Bathing Goal 1, OT) bathing skills, all  -SALVADOR     [place] Level/Cues Needed (Bathing Goal 1, OT) standby assist  -SALVADOR     Time Frame (Bathing Goal 1, OT) short term goal (STG);2 weeks  -SALVADOR     Row Name 08/08/22 1242          Dressing Goal 1 (OT)    Activity/Device (Dressing Goal 1, OT) dressing skills, all  -SALVADOR     [place]/Cues Needed (Dressing Goal 1, OT) standby  assist  -SALVADOR     Time Frame (Dressing Goal 1, OT) short term goal (STG);2 weeks  -SALVADOR     Row Name 08/08/22 1242          Toileting Goal 1 (OT)    Activity/Device (Toileting Goal 1, OT) toileting skills, all  -SALVADOR     [place] Level/Cues Needed (Toileting Goal 1, OT) standby assist  -SALVADOR     Time Frame (Toileting Goal 1, OT) short term goal (STG);2 weeks  -SALVADOR     Row Name 08/08/22 1242          Therapy Assessment/Plan (OT)    Planned Therapy Interventions (OT) activity tolerance training;functional balance retraining;occupation/activity based interventions;ROM/therapeutic exercise;strengthening exercise;transfer/mobility retraining;patient/caregiver education/training;neuromuscular control/coordination retraining;edema control/reduction;cognitive/visual perception retraining;BADL retraining;adaptive equipment training  -SALVADOR           User Key  (r) = Recorded By, (t) = Taken By, (c) = Cosigned By    Initials Name Provider Type    Aparna Matson, CHERRY Occupational Therapist               Clinical Impression     Row Name 08/08/22 1240          Pain Assessment    Pretreatment Pain Rating 0/10 - no pain  -SALVADOR     Pain Intervention(s) Repositioned;Ambulation/increased activity;Emotional support  -SALVADOR     Row Name 08/08/22 1240          Plan of Care Review    Plan of Care Reviewed With patient;daughter  -     Outcome Evaluation Pt is a 89 y/o F re-admitted after being treated for COVID 19, d/c home 7/30. Reportedly increasing weakness, fatigue, SOA and cough. US BLEs noted + for DVTs. On Lovenox since 8/7. PMH includes porcine AVR, CABRERA cirrhosis, DM 2, CKD, chronic vision loss/legally blind. Pt resides with family and has 24/7 care available. Pt uses RW for home mobility and short distances; she uses w/c for community mobility. Pt reports min A for ADLs at baseline. Pt requires SBA-supervision for bed mobility with heavy use of bedrail. Pt requires max A to don socks. Pt requires min A-CGA for transfer to bedside  chair. Pt appears below ADL baseline and will benefit from skilled OT services. OT recommends d/c home with family 24/7 care and HH.  -SALVADOR     Row Name 08/08/22 1240          Therapy Assessment/Plan (OT)    Rehab Potential (OT) good, to achieve stated therapy goals  -SALVADOR     Criteria for Skilled Therapeutic Interventions Met (OT) yes;skilled treatment is necessary  -SALVADOR     Therapy Frequency (OT) 3 times/wk  -SALVADOR     Row Name 08/08/22 1240          Therapy Plan Review/Discharge Plan (OT)    Anticipated Discharge Disposition (OT) home with home health  -SALVADOR     Row Name 08/08/22 1240          Vital Signs    Recovery Time VSS  -SALVADOR     Row Name 08/08/22 1240          Positioning and Restraints    Pre-Treatment Position in bed  -SALVADOR     Post Treatment Position chair  -SALVADOR     In Chair notified nsg;reclined;with family/caregiver;call light within reach;encouraged to call for assist  -SALVADOR           User Key  (r) = Recorded By, (t) = Taken By, (c) = Cosigned By    Initials Name Provider Type    Aparna Matson, OT Occupational Therapist               Outcome Measures     Row Name 08/08/22 1242          How much help from another is currently needed...    Putting on and taking off regular lower body clothing? 2  -SALVADOR     Bathing (including washing, rinsing, and drying) 2  -SALVADOR     Toileting (which includes using toilet bed pan or urinal) 2  -SALVADOR     Putting on and taking off regular upper body clothing 3  -SALVADOR     Taking care of personal grooming (such as brushing teeth) 4  -SALVADOR     Eating meals 4  -SALVADOR     AM-PAC 6 Clicks Score (OT) 17  -SALVADOR     Row Name 08/08/22 1242          Modified Todd Scale    Modified Iesha Scale 4 - Moderately severe disability.  Unable to walk without assistance, and unable to attend to own bodily needs without assistance.  -SALVADOR     Row Name 08/08/22 1242          Functional Assessment    Outcome Measure Options AM-PAC 6 Clicks Daily Activity (OT);Modified Iesha  -SALVADOR           User Key  (r) = Recorded  By, (t) = Taken By, (c) = Cosigned By    Initials Name Provider Type    Aparan Matson OT Occupational Therapist                Occupational Therapy Education                 Title: PT OT SLP Therapies (Done)     Topic: Occupational Therapy (Done)     Point: ADL training (Done)     Description:   Instruct learner(s) on proper safety adaptation and remediation techniques during self care or transfers.   Instruct in proper use of assistive devices.              Learning Progress Summary           Patient Acceptance, E,TB, VU by SALVADOR at 8/8/2022 1243   Family Acceptance, E,TB, VU by SALVADOR at 8/8/2022 1243                   Point: Home exercise program (Done)     Description:   Instruct learner(s) on appropriate technique for monitoring, assisting and/or progressing therapeutic exercises/activities.              Learning Progress Summary           Patient Acceptance, E,TB, VU by SALVADOR at 8/8/2022 1243   Family Acceptance, E,TB, VU by SALVADOR at 8/8/2022 1243                   Point: Precautions (Done)     Description:   Instruct learner(s) on prescribed precautions during self-care and functional transfers.              Learning Progress Summary           Patient Acceptance, E,TB, VU by SALVADOR at 8/8/2022 1243   Family Acceptance, E,TB, VU by  at 8/8/2022 1243                   Point: Body mechanics (Done)     Description:   Instruct learner(s) on proper positioning and spine alignment during self-care, functional mobility activities and/or exercises.              Learning Progress Summary           Patient Acceptance, E,TB, VU by SALVADOR at 8/8/2022 1243   Family Acceptance, E,TB, VU by  at 8/8/2022 1243                               User Key     Initials Effective Dates Name Provider Type Discipline    SALVADOR 06/16/21 -  Aparna Goldman OT Occupational Therapist OT              OT Recommendation and Plan  Planned Therapy Interventions (OT): activity tolerance training, functional balance retraining, occupation/activity  based interventions, ROM/therapeutic exercise, strengthening exercise, transfer/mobility retraining, patient/caregiver education/training, neuromuscular control/coordination retraining, edema control/reduction, cognitive/visual perception retraining, BADL retraining, adaptive equipment training  Therapy Frequency (OT): 3 times/wk  Plan of Care Review  Plan of Care Reviewed With: patient, daughter  Outcome Evaluation: Pt is a 89 y/o F re-admitted after being treated for COVID 19, d/c home 7/30. Reportedly increasing weakness, fatigue, SOA and cough. US BLEs noted + for DVTs. On Lovenox since 8/7. PMH includes porcine AVR, CABRERA cirrhosis, DM 2, CKD, chronic vision loss/legally blind. Pt resides with family and has 24/7 care available. Pt uses RW for home mobility and short distances; she uses w/c for community mobility. Pt reports min A for ADLs at baseline. Pt requires SBA-supervision for bed mobility with heavy use of bedrail. Pt requires max A to don socks. Pt requires min A-CGA for transfer to bedside chair. Pt appears below ADL baseline and will benefit from skilled OT services. OT recommends d/c home with family 24/7 care and HH.     Time Calculation:    Time Calculation- OT     Row Name 08/08/22 1243             Time Calculation- OT    OT Start Time 1109  -SALVADOR      OT Stop Time 1128  -SALVADOR      OT Time Calculation (min) 19 min  -SALVADOR      Total Timed Code Minutes- OT 11 minute(s)  -SALVADOR      OT Received On 08/08/22  -SALVADOR      OT - Next Appointment 08/09/22  -SALVADOR      OT Goal Re-Cert Due Date 08/22/22  -SALVADOR              Timed Charges    50948 - OT Self Care/Mgmt Minutes 11  -SALVADOR              Untimed Charges    OT Eval/Re-eval Minutes 8  -SALVADOR              Total Minutes    Timed Charges Total Minutes 11  -SALVADOR      Untimed Charges Total Minutes 8  -SALVADOR       Total Minutes 19  -SALVADOR            User Key  (r) = Recorded By, (t) = Taken By, (c) = Cosigned By    Initials Name Provider Type    Aparna Matson, OT Occupational  Therapist              Therapy Charges for Today     Code Description Service Date Service Provider Modifiers Qty    [id number]  OT SELF CARE/MGMT/TRAIN EA 15 MIN 8/8/2022 Aparna Goldman OT GO 1    [id number]  OT EVAL MOD COMPLEXITY 3 8/8/2022 Aparna Goldman OT GO 1               Aparna Goldman OT  8/8/2022

## 2022-08-08 NOTE — PLAN OF CARE
Goal Outcome Evaluation:  Plan of Care Reviewed With: patient, daughter           Outcome Evaluation: Pt is a 89 y/o F re-admitted after being treated for COVID 19, d/c home 7/30. Reportedly increasing weakness, fatigue, SOA and cough. US BLEs noted + for DVTs. On Lovenox since 8/7. PMH includes porcine AVR, CABRERA cirrhosis, DM 2, CKD, chronic vision loss/legally blind. Pt resides with family and has 24/7 care available. Pt uses RW for home mobility and short distances; she uses w/c for community mobility. Pt reports min A for ADLs at baseline. Pt requires SBA-supervision for bed mobility with heavy use of bedrail. Pt requires max A to don socks. Pt requires min A-CGA for transfer to bedside chair. Pt appears below ADL baseline and will benefit from skilled OT services. OT recommends d/c home with family 24/7 care and HH.    Patient was not wearing a face mask during this therapy encounter. Therapist used appropriate personal protective equipment including mask, gloves, eye protection, face shield, and gown.  Mask used was N95/duckbill. Appropriate PPE was worn during the entire therapy session. Hand hygiene was completed before and after therapy session. Patient is in enhanced droplet precautions.

## 2022-08-08 NOTE — PLAN OF CARE
Goal Outcome Evaluation:  Plan of Care Reviewed With: patient, daughter        Progress: improving  Outcome Evaluation: Calm and cooperative with care. A/Ox 4. VSS with 02 on RA. BS continues in the 300's. Lovenox DC'd and Eliquis 5 mg started. NM perfusion particulate scan negative for PE's. Up in chair for a few hours today. Daughter at bedside this morning and early afternoon. Continues in Covid isolation. Safety measures in place.

## 2022-08-08 NOTE — PROGRESS NOTES
" LOS: 2 days     Name: Helena Carmen  Age: 89 y.o.  Sex: female  :  1931  MRN: [id number]         Primary Care Physician: Mariah Hickman MD    Subjective   Subjective  She reports improvement of her dyspnea today.  Feeling better overall since admission.  On room air still.  No acute overnight events.    Objective   Vital Signs  Temp:  [96.7 °F (35.9 °C)-98 °F (36.7 °C)] 96.7 °F (35.9 °C)  Heart Rate:  [59-71] 59  Resp:  [16] 16  BP: (144-175)/(58-85) 175/74  Body mass index is 34.4 kg/m².    Objective:  General Appearance:  Comfortable and in no acute distress (Looks chronically ill, weak and deconditioned).    Vital signs: (most recent): Blood pressure 175/74, pulse 59, temperature 96.7 °F (35.9 °C), temperature source Oral, resp. rate 16, height 144.8 cm (57\"), weight 72.1 kg (158 lb 15.2 oz), SpO2 98 %.    Lungs:  Normal effort and normal respiratory rate.  Breath sounds clear to auscultation.  She is not in respiratory distress.  (Breath sounds are much improved since admission.  Entirely clear today with no rhonchi or wheezing.)  Heart: Normal rate.  Regular rhythm.    Abdomen: Abdomen is soft.  Bowel sounds are normal.   There is no abdominal tenderness.     Extremities: There is no dependent edema or local swelling.  (Minimal swelling today)  Neurological: Patient is alert and oriented to person, place and time.    Skin:  Warm and dry.              Results Review:       I reviewed the patient's new clinical results.    Results from last 7 days   Lab Units 22  0454 225 22  0633   WBC 10*3/mm3 7.09 5.46 7.78 8.85 4.52   HEMOGLOBIN g/dL 11.8* 12.4 14.2 13.9 12.6   PLATELETS 10*3/mm3 110* 86* 89* 98* 82*     Results from last 7 days   Lab Units 22  0438 22  0454 225 22  2340 22  0633   SODIUM mmol/L 137 135* 140 138 136   POTASSIUM mmol/L 4.6 4.3 3.9 3.7 4.1   CHLORIDE mmol/L 103 102 103 100 103   CO2 mmol/L " 21.0* 19.1* 23.0 23.4 18.6*   BUN mg/dL 30* 27* 27* 28* 33*   CREATININE mg/dL 1.23* 1.23* 1.39* 1.42* 1.24*   CALCIUM mg/dL 8.7 8.3 8.7 8.8 8.8   GLUCOSE mg/dL 387* 386* 93 64* 243*     Results from last 7 days   Lab Units 08/06/22  0415   INR  1.13*             Scheduled Meds:   amLODIPine, 10 mg, Oral, Daily  benzonatate, 100 mg, Oral, Nightly  enoxaparin, 1 mg/kg, Subcutaneous, Q24H  famotidine, 10 mg, Oral, BID  fluticasone, 2 spray, Each Nare, Daily  gabapentin, 300 mg, Oral, BID  guaiFENesin, 1,200 mg, Oral, Q12H  hydrALAZINE, 25 mg, Oral, Q8H  insulin glargine, 10 Units, Subcutaneous, Once  insulin glargine, 20 Units, Subcutaneous, Q12H  insulin lispro, 0-14 Units, Subcutaneous, TID AC  levothyroxine, 25 mcg, Oral, Q AM  metoprolol tartrate, 12.5 mg, Oral, BID  montelukast, 10 mg, Oral, Nightly  [START ON 8/9/2022] predniSONE, 10 mg, Oral, Daily  rosuvastatin, 5 mg, Oral, Daily  sodium bicarbonate, 650 mg, Oral, BID  sodium chloride, 10 mL, Intravenous, Q12H  tamsulosin, 0.4 mg, Oral, Daily      PRN Meds:   •  acetaminophen **OR** acetaminophen **OR** acetaminophen  •  benzocaine-menthol  •  dextrose  •  dextrose  •  glucagon (human recombinant)  •  hydrALAZINE  •  LORazepam  •  nitroglycerin  •  ondansetron  •  [COMPLETED] Insert peripheral IV **AND** sodium chloride  •  sodium chloride  Continuous Infusions:       Assessment & Plan   Active Hospital Problems    Diagnosis  POA   • **COVID-19 virus infection [U07.1]  Yes   • Acute DVT (deep venous thrombosis) (HCC) [I82.409]  Unknown   • Anemia, chronic disease [D63.8]  Yes   • Chronic heart failure with preserved ejection fraction (HCC) [I50.32]  Yes   • Type 2 diabetes mellitus with hyperglycemia (HCC) [E11.65]  Yes   • Malignant neoplasm of uterus (HCC) [C55]  Yes   • Pulmonary hypertension (HCC) [I27.20]  Yes   • Paroxysmal atrial fibrillation (HCC) [I48.0]  Yes   • Chronic kidney disease [N18.9]  Yes      Resolved Hospital Problems   No resolved  problems to display.       Assessment & Plan    -She reports symptomatic improvement over the course of her hospitalization thus far.  Switch Decadron back to her outpatient prednisone (started at 10 mg daily and transition back to 5 mg every other day which she was doing as an outpatient)  -Lower extremity Doppler yesterday revealed DVTs and she was started on full dose Lovenox.  Possible she has pulmonary embolism as well but unable to get contrasted CT scan.    Will get lung perfusion scan today.  -Noncontrasted chest CT shows nothing acute in the lung parenchyma or evidence of effusion.  Of note is a enlarged thyroid with some deviation of the trachea.  We will address this with family.  -No evidence of bacterial pneumonia  -Continue aggressive pulmonary hygiene.  She sounds much more clear today on respiratory exam  -Blood sugars remain very much uncontrolled.  Increase Lantus to 20 units twice daily.  Hopefully getting her off the Decadron and back to prednisone will help  -Flonase for her nasal congestion  -Blood pressure remains elevated and will continue Norvasc while adding scheduled oral hydralazine  -Thrombocytopenia noted.    This is a chronic problem.  Platelets better today at 110.  Monitor closely while on full dose anticoagulation.  Switch to Eliquis today.  -Renal function stable at baseline  -Discussed with patient, daughter, son, and nurse      I wore full protective equipment throughout the patient encounter including eye protection and facemask.  Hand hygiene was performed before donning protective equipment and after removal when leaving the room.    Ovidio Anguiano MD  [facility]  08/08/22  11:40 EDT

## 2022-08-09 LAB
ALBUMIN SERPL-MCNC: 3.4 G/DL (ref 3.5–5.2)
ALBUMIN/GLOB SERPL: 1.7 G/DL
ALP SERPL-CCNC: 53 U/L (ref 39–117)
ALT SERPL W P-5'-P-CCNC: 29 U/L (ref 1–33)
ANION GAP SERPL CALCULATED.3IONS-SCNC: 10 MMOL/L (ref 5–15)
AST SERPL-CCNC: 16 U/L (ref 1–32)
BASOPHILS # BLD AUTO: 0 10*3/MM3 (ref 0–0.2)
BASOPHILS NFR BLD AUTO: 0 % (ref 0–1.5)
BILIRUB SERPL-MCNC: 0.3 MG/DL (ref 0–1.2)
BUN SERPL-MCNC: 35 MG/DL (ref 8–23)
BUN/CREAT SERPL: 30.7 (ref 7–25)
CALCIUM SPEC-SCNC: 8.9 MG/DL (ref 8.2–9.6)
CHLORIDE SERPL-SCNC: 103 MMOL/L (ref 98–107)
CO2 SERPL-SCNC: 21 MMOL/L (ref 22–29)
CREAT SERPL-MCNC: 1.14 MG/DL (ref 0.57–1)
CRP SERPL-MCNC: 0.32 MG/DL (ref 0–0.5)
D DIMER PPP FEU-MCNC: 0.99 MCGFEU/ML (ref 0–0.49)
DEPRECATED RDW RBC AUTO: 47.6 FL (ref 37–54)
EGFRCR SERPLBLD CKD-EPI 2021: 45.5 ML/MIN/1.73
EOSINOPHIL # BLD AUTO: 0 10*3/MM3 (ref 0–0.4)
EOSINOPHIL NFR BLD AUTO: 0 % (ref 0.3–6.2)
ERYTHROCYTE [DISTWIDTH] IN BLOOD BY AUTOMATED COUNT: 14.4 % (ref 12.3–15.4)
GLOBULIN UR ELPH-MCNC: 2 GM/DL
GLUCOSE BLDC GLUCOMTR-MCNC: 246 MG/DL (ref 70–130)
GLUCOSE BLDC GLUCOMTR-MCNC: 286 MG/DL (ref 70–130)
GLUCOSE BLDC GLUCOMTR-MCNC: 295 MG/DL (ref 70–130)
GLUCOSE BLDC GLUCOMTR-MCNC: 326 MG/DL (ref 70–130)
GLUCOSE BLDC GLUCOMTR-MCNC: 359 MG/DL (ref 70–130)
GLUCOSE SERPL-MCNC: 313 MG/DL (ref 65–99)
HCT VFR BLD AUTO: 39.2 % (ref 34–46.6)
HGB BLD-MCNC: 12.3 G/DL (ref 12–15.9)
LYMPHOCYTES # BLD AUTO: 0.75 10*3/MM3 (ref 0.7–3.1)
LYMPHOCYTES NFR BLD AUTO: 10.4 % (ref 19.6–45.3)
MCH RBC QN AUTO: 28.7 PG (ref 26.6–33)
MCHC RBC AUTO-ENTMCNC: 31.4 G/DL (ref 31.5–35.7)
MCV RBC AUTO: 91.4 FL (ref 79–97)
MONOCYTES # BLD AUTO: 0.33 10*3/MM3 (ref 0.1–0.9)
MONOCYTES NFR BLD AUTO: 4.6 % (ref 5–12)
NEUTROPHILS NFR BLD AUTO: 6.01 10*3/MM3 (ref 1.7–7)
NEUTROPHILS NFR BLD AUTO: 83.6 % (ref 42.7–76)
PLATELET # BLD AUTO: 116 10*3/MM3 (ref 140–450)
PMV BLD AUTO: 11.6 FL (ref 6–12)
POTASSIUM SERPL-SCNC: 4.2 MMOL/L (ref 3.5–5.2)
PROCALCITONIN SERPL-MCNC: 0.05 NG/ML (ref 0–0.25)
PROT SERPL-MCNC: 5.4 G/DL (ref 6–8.5)
RBC # BLD AUTO: 4.29 10*6/MM3 (ref 3.77–5.28)
SODIUM SERPL-SCNC: 134 MMOL/L (ref 136–145)
WBC NRBC COR # BLD: 7.19 10*3/MM3 (ref 3.4–10.8)

## 2022-08-09 PROCEDURE — 97110 THERAPEUTIC EXERCISES: CPT

## 2022-08-09 PROCEDURE — 85379 FIBRIN DEGRADATION QUANT: CPT | Performed by: INTERNAL MEDICINE

## 2022-08-09 PROCEDURE — 63710000001 PREDNISONE PER 5 MG: Performed by: INTERNAL MEDICINE

## 2022-08-09 PROCEDURE — 94761 N-INVAS EAR/PLS OXIMETRY MLT: CPT

## 2022-08-09 PROCEDURE — 94799 UNLISTED PULMONARY SVC/PX: CPT

## 2022-08-09 PROCEDURE — 82962 GLUCOSE BLOOD TEST: CPT

## 2022-08-09 PROCEDURE — 80053 COMPREHEN METABOLIC PANEL: CPT | Performed by: INTERNAL MEDICINE

## 2022-08-09 PROCEDURE — 86140 C-REACTIVE PROTEIN: CPT | Performed by: INTERNAL MEDICINE

## 2022-08-09 PROCEDURE — 85025 COMPLETE CBC W/AUTO DIFF WBC: CPT | Performed by: INTERNAL MEDICINE

## 2022-08-09 PROCEDURE — 63710000001 INSULIN LISPRO (HUMAN) PER 5 UNITS: Performed by: INTERNAL MEDICINE

## 2022-08-09 PROCEDURE — 97162 PT EVAL MOD COMPLEX 30 MIN: CPT

## 2022-08-09 PROCEDURE — 84145 PROCALCITONIN (PCT): CPT | Performed by: INTERNAL MEDICINE

## 2022-08-09 RX ORDER — HYDRALAZINE HYDROCHLORIDE 50 MG/1
50 TABLET, FILM COATED ORAL EVERY 8 HOURS SCHEDULED
Status: DISCONTINUED | OUTPATIENT
Start: 2022-08-09 | End: 2022-08-10 | Stop reason: HOSPADM

## 2022-08-09 RX ORDER — FUROSEMIDE 20 MG/1
20 TABLET ORAL DAILY
Status: DISCONTINUED | OUTPATIENT
Start: 2022-08-09 | End: 2022-08-10 | Stop reason: HOSPADM

## 2022-08-09 RX ORDER — PREDNISONE 10 MG/1
5 TABLET ORAL DAILY
Status: DISCONTINUED | OUTPATIENT
Start: 2022-08-10 | End: 2022-08-10 | Stop reason: HOSPADM

## 2022-08-09 RX ADMIN — INSULIN GLARGINE-YFGN 30 UNITS: 100 INJECTION, SOLUTION SUBCUTANEOUS at 22:50

## 2022-08-09 RX ADMIN — INSULIN LISPRO 8 UNITS: 100 INJECTION, SOLUTION INTRAVENOUS; SUBCUTANEOUS at 17:05

## 2022-08-09 RX ADMIN — LORAZEPAM 0.5 MG: 0.5 TABLET ORAL at 10:21

## 2022-08-09 RX ADMIN — GABAPENTIN 300 MG: 300 CAPSULE ORAL at 22:29

## 2022-08-09 RX ADMIN — APIXABAN 5 MG: 5 TABLET, FILM COATED ORAL at 22:33

## 2022-08-09 RX ADMIN — FUROSEMIDE 20 MG: 20 TABLET ORAL at 13:21

## 2022-08-09 RX ADMIN — APIXABAN 5 MG: 5 TABLET, FILM COATED ORAL at 10:13

## 2022-08-09 RX ADMIN — METOPROLOL TARTRATE 12.5 MG: 25 TABLET ORAL at 10:15

## 2022-08-09 RX ADMIN — ACETAMINOPHEN 650 MG: 325 TABLET, FILM COATED ORAL at 06:32

## 2022-08-09 RX ADMIN — MONTELUKAST SODIUM 10 MG: 10 TABLET, FILM COATED ORAL at 22:28

## 2022-08-09 RX ADMIN — METOPROLOL TARTRATE 12.5 MG: 25 TABLET ORAL at 22:29

## 2022-08-09 RX ADMIN — FLUTICASONE PROPIONATE 2 SPRAY: 50 SPRAY, METERED NASAL at 10:13

## 2022-08-09 RX ADMIN — ROSUVASTATIN CALCIUM 5 MG: 5 TABLET, FILM COATED ORAL at 10:13

## 2022-08-09 RX ADMIN — PREDNISONE 10 MG: 10 TABLET ORAL at 10:12

## 2022-08-09 RX ADMIN — LORAZEPAM 0.5 MG: 0.5 TABLET ORAL at 22:29

## 2022-08-09 RX ADMIN — SODIUM BICARBONATE 650 MG: 650 TABLET ORAL at 22:28

## 2022-08-09 RX ADMIN — GUAIFENESIN 1200 MG: 600 TABLET, EXTENDED RELEASE ORAL at 22:29

## 2022-08-09 RX ADMIN — HYDRALAZINE HYDROCHLORIDE 50 MG: 50 TABLET, FILM COATED ORAL at 22:28

## 2022-08-09 RX ADMIN — HYDRALAZINE HYDROCHLORIDE 25 MG: 25 TABLET, FILM COATED ORAL at 06:32

## 2022-08-09 RX ADMIN — FAMOTIDINE 10 MG: 20 TABLET ORAL at 10:13

## 2022-08-09 RX ADMIN — ACETAMINOPHEN 650 MG: 325 TABLET, FILM COATED ORAL at 22:34

## 2022-08-09 RX ADMIN — GABAPENTIN 300 MG: 300 CAPSULE ORAL at 10:13

## 2022-08-09 RX ADMIN — INSULIN LISPRO 10 UNITS: 100 INJECTION, SOLUTION INTRAVENOUS; SUBCUTANEOUS at 13:21

## 2022-08-09 RX ADMIN — Medication 10 ML: at 10:15

## 2022-08-09 RX ADMIN — Medication 10 ML: at 22:30

## 2022-08-09 RX ADMIN — INSULIN GLARGINE-YFGN 20 UNITS: 100 INJECTION, SOLUTION SUBCUTANEOUS at 10:11

## 2022-08-09 RX ADMIN — TAMSULOSIN HYDROCHLORIDE 0.4 MG: 0.4 CAPSULE ORAL at 10:13

## 2022-08-09 RX ADMIN — INSULIN LISPRO 8 UNITS: 100 INJECTION, SOLUTION INTRAVENOUS; SUBCUTANEOUS at 10:12

## 2022-08-09 RX ADMIN — GUAIFENESIN 1200 MG: 600 TABLET, EXTENDED RELEASE ORAL at 10:12

## 2022-08-09 RX ADMIN — AMLODIPINE BESYLATE 10 MG: 10 TABLET ORAL at 10:13

## 2022-08-09 RX ADMIN — LEVOTHYROXINE SODIUM 25 MCG: 0.03 TABLET ORAL at 06:32

## 2022-08-09 RX ADMIN — SODIUM BICARBONATE 650 MG: 650 TABLET ORAL at 10:13

## 2022-08-09 RX ADMIN — INSULIN GLARGINE-YFGN 10 UNITS: 100 INJECTION, SOLUTION SUBCUTANEOUS at 13:20

## 2022-08-09 RX ADMIN — BENZONATATE 100 MG: 100 CAPSULE ORAL at 22:29

## 2022-08-09 RX ADMIN — HYDRALAZINE HYDROCHLORIDE 50 MG: 50 TABLET, FILM COATED ORAL at 13:24

## 2022-08-09 RX ADMIN — FAMOTIDINE 10 MG: 20 TABLET ORAL at 22:29

## 2022-08-09 NOTE — PLAN OF CARE
Goal Outcome Evaluation:           Progress: improving  Outcome Evaluation: VSS; no complaints of pain or discomfort; turn and reposition; pt recieved extra dose of lantus this afternoon; blood sugar down around the dinner meal; rechecked blood pressure; blood pressure down to 160s/70s; patient did work with PT and walked to the door and back to bed; patient also using the inspirometer and flutter valve; discharge hopefully soon; continue to monitor  Problem: Adult Inpatient Plan of Care  Goal: Plan of Care Review  Outcome: Ongoing, Progressing  Flowsheets  Taken 8/9/2022 1737 by Rossy Macedo RN  Progress: improving  Outcome Evaluation:   VSS   no complaints of pain or discomfort   turn and reposition   pt recieved extra dose of lantus this afternoon   blood sugar down around the dinner meal   rechecked blood pressure   blood pressure down to 160s/70s   patient did work with PT and walked to the door and back to bed   patient also using the inspirometer and flutter valve   discharge hopefully soon   continue to monitor  Taken 8/9/2022 1311 by Lucina Downs PT  Plan of Care Reviewed With: patient

## 2022-08-09 NOTE — PROGRESS NOTES
" LOS: 3 days     Name: Helena Carmen  Age: 89 y.o.  Sex: female  :  1931  MRN: [id number]         Primary Care Physician: Mariah Hickman MD    Subjective   Subjective  Her voice is starting to come back slowly.  Shortness of breath has improved.  She was able to walk to and from the doorway in her room without difficulty.  Daughter at bedside states she is doing better overall and she was impressed with how well she walked.  Blood sugars and blood pressure remain elevated.    Objective   Vital Signs  Temp:  [94.7 °F (34.8 °C)-97.4 °F (36.3 °C)] 96.3 °F (35.7 °C)  Heart Rate:  [47-60] 60  Resp:  [16-18] 18  BP: (165-177)/(71-94) 165/74  Body mass index is 34.4 kg/m².    Objective:  General Appearance:  Comfortable and in no acute distress (Looks chronically ill, weak and deconditioned).    Vital signs: (most recent): Blood pressure 173/89, pulse 52, temperature 97.8 °F (36.6 °C), temperature source Oral, resp. rate 18, height 144.8 cm (57\"), weight 72.1 kg (158 lb 15.2 oz), SpO2 96 %.    Lungs:  Normal effort and normal respiratory rate.  She is not in respiratory distress.  There are decreased breath sounds.  (Breath sounds are clear, just diminished at the bases)  Heart: Normal rate.  Regular rhythm.    Abdomen: Abdomen is soft.  Bowel sounds are normal.   There is no abdominal tenderness.     Extremities: There is no dependent edema or local swelling.    Neurological: Patient is alert and oriented to person, place and time.    Skin:  Warm and dry.              Results Review:       I reviewed the patient's new clinical results.    Results from last 7 days   Lab Units 22  0430 22  0438 22  0454 22  0415 22  2340   WBC 10*3/mm3 7.19 7.09 5.46 7.78 8.85   HEMOGLOBIN g/dL 12.3 11.8* 12.4 14.2 13.9   PLATELETS 10*3/mm3 116* 110* 86* 89* 98*     Results from last 7 days   Lab Units 22  0430 22  0438 22  0454 22  0415 22  2340   SODIUM mmol/L 134* " 137 135* 140 138   POTASSIUM mmol/L 4.2 4.6 4.3 3.9 3.7   CHLORIDE mmol/L 103 103 102 103 100   CO2 mmol/L 21.0* 21.0* 19.1* 23.0 23.4   BUN mg/dL 35* 30* 27* 27* 28*   CREATININE mg/dL 1.14* 1.23* 1.23* 1.39* 1.42*   CALCIUM mg/dL 8.9 8.7 8.3 8.7 8.8   GLUCOSE mg/dL 313* 387* 386* 93 64*     Results from last 7 days   Lab Units 08/06/22  0415   INR  1.13*             Scheduled Meds:   amLODIPine, 10 mg, Oral, Daily  apixaban, 5 mg, Oral, Q12H  benzonatate, 100 mg, Oral, Nightly  famotidine, 10 mg, Oral, BID  fluticasone, 2 spray, Each Nare, Daily  furosemide, 20 mg, Oral, Daily  gabapentin, 300 mg, Oral, BID  guaiFENesin, 1,200 mg, Oral, Q12H  hydrALAZINE, 50 mg, Oral, Q8H  insulin glargine, 10 Units, Subcutaneous, Once  insulin glargine, 30 Units, Subcutaneous, Q12H  insulin lispro, 0-14 Units, Subcutaneous, TID AC  levothyroxine, 25 mcg, Oral, Q AM  metoprolol tartrate, 12.5 mg, Oral, BID  montelukast, 10 mg, Oral, Nightly  [START ON 8/10/2022] predniSONE, 5 mg, Oral, Daily  rosuvastatin, 5 mg, Oral, Daily  sodium bicarbonate, 650 mg, Oral, BID  sodium chloride, 10 mL, Intravenous, Q12H  tamsulosin, 0.4 mg, Oral, Daily      PRN Meds:   •  acetaminophen **OR** acetaminophen **OR** acetaminophen  •  benzocaine-menthol  •  dextrose  •  dextrose  •  glucagon (human recombinant)  •  hydrALAZINE  •  LORazepam  •  nitroglycerin  •  ondansetron  •  [COMPLETED] Insert peripheral IV **AND** sodium chloride  •  sodium chloride  Continuous Infusions:       Assessment & Plan   Active Hospital Problems    Diagnosis  POA   • **COVID-19 virus infection [U07.1]  Yes   • Acute DVT (deep venous thrombosis) (HCC) [I82.409]  Unknown   • Anemia, chronic disease [D63.8]  Yes   • Chronic heart failure with preserved ejection fraction (HCC) [I50.32]  Yes   • Type 2 diabetes mellitus with hyperglycemia (HCC) [E11.65]  Yes   • Malignant neoplasm of uterus (HCC) [C55]  Yes   • Pulmonary hypertension (HCC) [I27.20]  Yes   • Paroxysmal atrial  fibrillation (HCC) [I48.0]  Yes   • Chronic kidney disease [N18.9]  Yes      Resolved Hospital Problems   No resolved problems to display.       Assessment & Plan    -She reports symptomatic improvement over the course of her hospitalization thus far.  I think she is improving nicely.   -Lower extremity Doppler revealed bilateral DVTs.  Lung perfusion scan was negative for evidence of PE.  Has been transitioned to Eliquis and recommend a 6-week course and reevaluation with repeat ultrasound at that time to determine further need for blood thinning.  -Noncontrasted chest CT shows nothing acute in the lung parenchyma or evidence of effusion.  Of note is a enlarged thyroid with some deviation of the trachea.    -No evidence of bacterial pneumonia  -Continue aggressive pulmonary hygiene.  She sounds much more clear today on respiratory exam  -Blood sugars remain uncontrolled.  Discussed with daughter who states that she typically takes Lantus 30 units in the evening and has a sliding scale.  This did not make down to her home medication list.  I am increasing her to 30 units of Lantus twice daily and will continue sliding scale for now.  -Reduce prednisone dose to 5 mg daily with eventual transition to every other day dosing as per her outpatient regimen  -Blood pressure remains elevated and will continue Norvasc and increase hydralazine dose today.  -Thrombocytopenia noted.    This is a chronic problem.  Platelets improved.  Monitor closely while on full dose anticoagulation.  -Renal function stable at baseline  -Restart her home dose of Lasix  -Discussed with patient, daughter, son, and nurse    Possible discharge home tomorrow      I wore full protective equipment throughout the patient encounter including eye protection and facemask.  Hand hygiene was performed before donning protective equipment and after removal when leaving the room.    Ovidio Anguiano MD  [facility]  08/09/22  12:27 EDT

## 2022-08-09 NOTE — PLAN OF CARE
Goal Outcome Evaluation:  Plan of Care Reviewed With: patient           Outcome Evaluation: Pt adm with weakness, fatigue, cough, SOA, was recently discharged from this hospital with COVID, went home with 24 hour care. pt presents with weakness and impaired functional mobility, she will benefit from PT to address, plan is to return home with assist

## 2022-08-09 NOTE — PLAN OF CARE
Goal Outcome Evaluation:  Plan of Care Reviewed With: patient           Outcome Evaluation: VSS--HR in upper 40s at times, pt c/o pain--tylenol given with relief. Ativan given for anxiety. Pt appeared to sleep well between care. Will CTM, safety maintained.

## 2022-08-09 NOTE — THERAPY EVALUATION
Patient Name: Helena Carmen  : 1931    MRN: [id number]                              Today's Date: 2022       Admit Date: 2022    Visit Dx:     ICD-10-CM ICD-9-CM   1. COVID-19 virus infection  U07.1 079.89   2. Generalized weakness  R53.1 780.79   3. Hypoglycemia  E16.2 251.2     Patient Active Problem List   Diagnosis   • Aortic valve stenosis   • Fatigue   • MI (mitral incompetence)   • PVC (premature ventricular contraction)   • Legally blind   • Essential hypertension   • Hypertriglyceridemia   • Pulmonary hypertension (HCC)   • Paroxysmal atrial fibrillation (HCC)   • Anxiety   • Chronic kidney disease   • Chronic pain disorder   • Degeneration of lumbar intervertebral disc   • Type 2 diabetes mellitus with hyperglycemia (HCC)   • Esophageal reflux   • Gastroparesis   • Hiatal hernia   • Iatrogenic hypothyroidism   • Macular degeneration   • Malignant neoplasm of uterus (HCC)   • Osteoarthritis of knee   • Peripheral nerve disease   • Secondary hyperparathyroidism (HCC)   • Thrombocytopenia (HCC)   • Chronic heart failure with preserved ejection fraction (HCC)   • Other cirrhosis of liver (HCC)   • Hypothyroidism   • Nonrheumatic tricuspid valve regurgitation   • COVID-19   • Anemia, chronic disease   • Weakness generalized   • Cytokine release syndrome, grade 1   • Urine retention   • COVID-19 virus infection   • Acute DVT (deep venous thrombosis) (HCC)     Past Medical History:   Diagnosis Date   • Aortic valve stenosis     s/p tissue AVR   • Back pain    • CKD (chronic kidney disease)    • Colitis due to Clostridioides difficile 2022   • Diastolic dysfunction     Grade 2 per echocardiogram    • Diverticulosis    • Exertional shortness of breath    • Heart disease    • Hiatal hernia    • Hyperlipidemia    • Hypertension    • Hyperthyroidism    • Hypertriglyceridemia 2018   • Hypothyroidism    • Left ventricular hypertrophy    • Legally blind    • Liver disease    • Macular  degeneration    • Mitral regurgitation    • Osteoarthritis of hip    • Pancreatitis 01/26/2022   • Paroxysmal atrial fibrillation (HCC)    • Premature ventricular contractions    • Pulmonary hypertension (HCC)    • Renal insufficiency syndrome    • Type 2 diabetes mellitus (HCC)    • Uterine cancer (HCC)      Past Surgical History:   Procedure Laterality Date   • AORTIC VALVE REPAIR/REPLACEMENT     • CATARACT EXTRACTION      1970, 1999   • ENDOSCOPY  08/15/2014    no gross lesions in stomach/duodenum, erythrematous mucosa in stomach   • HYSTERECTOMY  2007   • STERNOTOMY        General Information     Row Name 08/09/22 1304          Physical Therapy Time and Intention    Document Type evaluation;therapy note (daily note)  -PC     Mode of Treatment physical therapy  -PC     Row Name 08/09/22 1304          General Information    Patient Profile Reviewed yes  -PC     Prior Level of Function --  recently discharged home, has 24/7 care  -PC     Existing Precautions/Restrictions fall  -     Barriers to Rehab medically complex;previous functional deficit  -     Row Name 08/09/22 1304          Living Environment    People in Home child(edison), adult  -     Row Name 08/09/22 1304          Cognition    Orientation Status (Cognition) oriented x 3  -PC           User Key  (r) = Recorded By, (t) = Taken By, (c) = Cosigned By    Initials Name Provider Type    PC Lucina Downs, PT Physical Therapist               Mobility     Row Name 08/09/22 1307          Bed Mobility    Bed Mobility sit-supine  -PC     Supine-Sit [place] (Bed Mobility) contact guard  -PC     Sit-Supine [place] (Bed Mobility) contact guard  -     Assistive Device (Bed Mobility) head of bed elevated;bed rails  -     Row Name 08/09/22 1307          Sit-Stand Transfer    Sit-Stand [place] (Transfers) minimum assist (75% patient effort)  -     Assistive Device (Sit-Stand Transfers) walker, front-wheeled  -     Row Name 08/09/22 1301           Gait/Stairs (Locomotion)    [place] Level (Gait) minimum assist (75% patient effort)  -PC     Assistive Device (Gait) walker, front-wheeled  -PC     Distance in Feet (Gait) 20 ft  -PC     Deviations/Abnormal Patterns (Gait) gait speed decreased  -PC     Bilateral Gait Deviations forward flexed posture;heel strike decreased  -PC     Comment, (Gait/Stairs) as pt walked, she became more forward flexed  -PC           User Key  (r) = Recorded By, (t) = Taken By, (c) = Cosigned By    Initials Name Provider Type    PC Lucina Downs PT Physical Therapist               Obj/Interventions     Row Name 08/09/22 1309          Range of Motion Comprehensive    General Range of Motion bilateral lower extremity ROM WFL  -PC     Row Name 08/09/22 1309          Strength Comprehensive (MMT)    Comment, General Manual Muscle Testing (MMT) Assessment generalized weakness, no focal deficits  -PC     Row Name 08/09/22 1309          Balance    Static Sitting Balance supervision  -PC     Dynamic Sitting Balance standby assist  -PC     Position, Sitting Balance sitting edge of bed  -PC     Static Standing Balance contact guard  -PC     Dynamic Standing Balance minimal assist  -PC     Position/Device Used, Standing Balance walker, rolling  -PC           User Key  (r) = Recorded By, (t) = Taken By, (c) = Cosigned By    Initials Name Provider Type    PC Lucina Downs PT Physical Therapist               Goals/Plan     Row Name 08/09/22 132          Bed Mobility Goal 1 (PT)    Activity/Assistive Device (Bed Mobility Goal 1, PT) sit to supine/supine to sit  -PC     [place] Level/Cues Needed (Bed Mobility Goal 1, PT) supervision required  -PC     Time Frame (Bed Mobility Goal 1, PT) 1 week  -PC     Row Name 08/09/22 1326          Transfer Goal 1 (PT)    Activity/Assistive Device (Transfer Goal 1, PT) sit-to-stand/stand-to-sit  -PC     [place] Level/Cues Needed (Transfer Goal 1, PT) supervision required  -PC     Time Frame  (Transfer Goal 1, PT) 1 week  -PC     Row Name 08/09/22 1328          Gait Training Goal 1 (PT)    Activity/Assistive Device (Gait Training Goal 1, PT) gait (walking locomotion);assistive device use  -PC     [place] Level (Gait Training Goal 1, PT) supervision required  -PC     Distance (Gait Training Goal 1, PT) 75 ft  -PC     Time Frame (Gait Training Goal 1, PT) 1 week  -PC     Row Name 08/09/22 1328          Therapy Assessment/Plan (PT)    Planned Therapy Interventions (PT) bed mobility training;gait training;transfer training;strengthening  -PC           User Key  (r) = Recorded By, (t) = Taken By, (c) = Cosigned By    Initials Name Provider Type    PC Lucina Downs, PT Physical Therapist               Clinical Impression     Row Name 08/09/22 1311          Pain    Pretreatment Pain Rating 0/10 - no pain  -PC     Row Name 08/09/22 1311          Plan of Care Review    Plan of Care Reviewed With patient  -PC     Outcome Evaluation Pt adm with weakness, fatigue, cough, SOA, was recently discharged from this hospital with COVID, went home with 24 hour care. pt presents with weakness and impaired functional mobility, she will benefit from PT to address, plan is to return home with assist  -PC     Row Name 08/09/22 1311          Positioning and Restraints    Pre-Treatment Position in bed  -PC     Post Treatment Position bed  -PC     In Bed supine;call light within reach;encouraged to call for assist;exit alarm on;with family/caregiver  -PC           User Key  (r) = Recorded By, (t) = Taken By, (c) = Cosigned By    Initials Name Provider Type    PC Lucina Downs, PT Physical Therapist               Outcome Measures     Row Name 08/09/22 1322          How much help from another person do you currently need...    Turning from your back to your side while in flat bed without using bedrails? 3  -PC     Moving from lying on back to sitting on the side of a flat bed without bedrails? 3  -PC     Moving to and from a  bed to a chair (including a wheelchair)? 3  -PC     Standing up from a chair using your arms (e.g., wheelchair, bedside chair)? 3  -PC     Climbing 3-5 steps with a railing? 2  -PC     To walk in hospital room? 3  -PC     AM-PAC 6 Clicks Score (PT) 17  -PC     Highest level of mobility 5 --> Static standing  -PC     Row Name 08/09/22 1329          Functional Assessment    Outcome Measure Options AM-PAC 6 Clicks Basic Mobility (PT)  -PC           User Key  (r) = Recorded By, (t) = Taken By, (c) = Cosigned By    Initials Name Provider Type    PC Lucina Downs, PT Physical Therapist                             Physical Therapy Education                 Title: PT OT SLP Therapies (Done)     Topic: Physical Therapy (Done)     Point: Mobility training (Done)     Learning Progress Summary           Patient Acceptance, E,D, DU,NR by PC at 8/9/2022 1331                   Point: Home exercise program (Done)     Learning Progress Summary           Patient Acceptance, E,D, DU,NR by PC at 8/9/2022 1331                   Point: Body mechanics (Done)     Learning Progress Summary           Patient Acceptance, E,D, DU,NR by PC at 8/9/2022 1331                   Point: Precautions (Done)     Learning Progress Summary           Patient Acceptance, E,D, DU,NR by PC at 8/9/2022 1331                               User Key     Initials Effective Dates Name Provider Type Discipline     06/16/21 -  Lucina Downs, PT Physical Therapist PT              PT Recommendation and Plan  Planned Therapy Interventions (PT): bed mobility training, gait training, transfer training, strengthening  Plan of Care Reviewed With: patient  Outcome Evaluation: Pt adm with weakness, fatigue, cough, SOA, was recently discharged from this hospital with COVID, went home with 24 hour care. pt presents with weakness and impaired functional mobility, she will benefit from PT to address, plan is to return home with assist     Time Calculation:    PT Charges      Row Name 08/09/22 1331             Time Calculation    Start Time 1125  -PC      Stop Time 1140  -PC      Time Calculation (min) 15 min  -PC      PT Received On 08/09/22  -PC      PT - Next Appointment 08/10/22  -PC      PT Goal Re-Cert Due Date 08/16/22  -PC            User Key  (r) = Recorded By, (t) = Taken By, (c) = Cosigned By    Initials Name Provider Type    PC Lucina Downs, PT Physical Therapist              Therapy Charges for Today     Code Description Service Date Service Provider Modifiers Qty    26260738970  PT EVAL MOD COMPLEXITY 2 8/9/2022 Lucina Downs, PT GP 1    [id number]  PT THER PROC EA 15 MIN 8/9/2022 Lucina Downs, PT GP 1          PT G-Codes  Outcome Measure Options: AM-PAC 6 Clicks Basic Mobility (PT)  AM-PAC 6 Clicks Score (PT): 17  AM-PAC 6 Clicks Score (OT): 17  Modified [place] Scale: 4 - Moderately severe disability.  Unable to walk without assistance, and unable to attend to own bodily needs without assistance.    Lucina Downs PT  8/9/2022

## 2022-08-10 ENCOUNTER — READMISSION MANAGEMENT (OUTPATIENT)
Dept: CALL CENTER | Facility: HOSPITAL | Age: 87
End: 2022-08-10

## 2022-08-10 VITALS
SYSTOLIC BLOOD PRESSURE: 169 MMHG | HEIGHT: 57 IN | BODY MASS INDEX: 34.29 KG/M2 | RESPIRATION RATE: 19 BRPM | WEIGHT: 158.95 LBS | HEART RATE: 72 BPM | TEMPERATURE: 98.2 F | DIASTOLIC BLOOD PRESSURE: 64 MMHG | OXYGEN SATURATION: 100 %

## 2022-08-10 LAB
ALBUMIN SERPL-MCNC: 3.2 G/DL (ref 3.5–5.2)
ALBUMIN/GLOB SERPL: 1.5 G/DL
ALP SERPL-CCNC: 55 U/L (ref 39–117)
ALT SERPL W P-5'-P-CCNC: 30 U/L (ref 1–33)
ANION GAP SERPL CALCULATED.3IONS-SCNC: 12 MMOL/L (ref 5–15)
AST SERPL-CCNC: 20 U/L (ref 1–32)
BASOPHILS # BLD AUTO: 0.01 10*3/MM3 (ref 0–0.2)
BASOPHILS NFR BLD AUTO: 0.1 % (ref 0–1.5)
BILIRUB SERPL-MCNC: 0.3 MG/DL (ref 0–1.2)
BUN SERPL-MCNC: 37 MG/DL (ref 8–23)
BUN/CREAT SERPL: 28.5 (ref 7–25)
CALCIUM SPEC-SCNC: 8.7 MG/DL (ref 8.2–9.6)
CHLORIDE SERPL-SCNC: 106 MMOL/L (ref 98–107)
CO2 SERPL-SCNC: 22 MMOL/L (ref 22–29)
CREAT SERPL-MCNC: 1.3 MG/DL (ref 0.57–1)
CRP SERPL-MCNC: <0.3 MG/DL (ref 0–0.5)
DEPRECATED RDW RBC AUTO: 44.1 FL (ref 37–54)
EGFRCR SERPLBLD CKD-EPI 2021: 38.9 ML/MIN/1.73
EOSINOPHIL # BLD AUTO: 0.02 10*3/MM3 (ref 0–0.4)
EOSINOPHIL NFR BLD AUTO: 0.2 % (ref 0.3–6.2)
ERYTHROCYTE [DISTWIDTH] IN BLOOD BY AUTOMATED COUNT: 14.3 % (ref 12.3–15.4)
GLOBULIN UR ELPH-MCNC: 2.1 GM/DL
GLUCOSE BLDC GLUCOMTR-MCNC: 151 MG/DL (ref 70–130)
GLUCOSE BLDC GLUCOMTR-MCNC: 91 MG/DL (ref 70–130)
GLUCOSE SERPL-MCNC: 125 MG/DL (ref 65–99)
HCT VFR BLD AUTO: 36.9 % (ref 34–46.6)
HGB BLD-MCNC: 12.4 G/DL (ref 12–15.9)
LYMPHOCYTES # BLD AUTO: 1.36 10*3/MM3 (ref 0.7–3.1)
LYMPHOCYTES NFR BLD AUTO: 14.4 % (ref 19.6–45.3)
MCH RBC QN AUTO: 28.7 PG (ref 26.6–33)
MCHC RBC AUTO-ENTMCNC: 33.6 G/DL (ref 31.5–35.7)
MCV RBC AUTO: 85.4 FL (ref 79–97)
MONOCYTES # BLD AUTO: 0.91 10*3/MM3 (ref 0.1–0.9)
MONOCYTES NFR BLD AUTO: 9.6 % (ref 5–12)
NEUTROPHILS NFR BLD AUTO: 7.06 10*3/MM3 (ref 1.7–7)
NEUTROPHILS NFR BLD AUTO: 74.7 % (ref 42.7–76)
PLATELET # BLD AUTO: 100 10*3/MM3 (ref 140–450)
PMV BLD AUTO: 11.2 FL (ref 6–12)
POTASSIUM SERPL-SCNC: 3.5 MMOL/L (ref 3.5–5.2)
PROT SERPL-MCNC: 5.3 G/DL (ref 6–8.5)
RBC # BLD AUTO: 4.32 10*6/MM3 (ref 3.77–5.28)
SODIUM SERPL-SCNC: 140 MMOL/L (ref 136–145)
WBC NRBC COR # BLD: 9.45 10*3/MM3 (ref 3.4–10.8)

## 2022-08-10 PROCEDURE — 63710000001 PREDNISONE PER 5 MG: Performed by: INTERNAL MEDICINE

## 2022-08-10 PROCEDURE — 82962 GLUCOSE BLOOD TEST: CPT

## 2022-08-10 PROCEDURE — 94799 UNLISTED PULMONARY SVC/PX: CPT

## 2022-08-10 PROCEDURE — 86140 C-REACTIVE PROTEIN: CPT | Performed by: INTERNAL MEDICINE

## 2022-08-10 PROCEDURE — 80053 COMPREHEN METABOLIC PANEL: CPT | Performed by: INTERNAL MEDICINE

## 2022-08-10 PROCEDURE — 63710000001 INSULIN LISPRO (HUMAN) PER 5 UNITS: Performed by: INTERNAL MEDICINE

## 2022-08-10 PROCEDURE — 85025 COMPLETE CBC W/AUTO DIFF WBC: CPT | Performed by: INTERNAL MEDICINE

## 2022-08-10 RX ORDER — PREDNISONE 1 MG/1
TABLET ORAL
Qty: 30 TABLET | Refills: 0 | Status: SHIPPED | OUTPATIENT
Start: 2022-08-10 | End: 2022-08-16

## 2022-08-10 RX ORDER — HYDRALAZINE HYDROCHLORIDE 50 MG/1
50 TABLET, FILM COATED ORAL EVERY 8 HOURS SCHEDULED
Qty: 90 TABLET | Refills: 0 | Status: SHIPPED | OUTPATIENT
Start: 2022-08-10 | End: 2022-08-29 | Stop reason: HOSPADM

## 2022-08-10 RX ADMIN — ACETAMINOPHEN 650 MG: 325 TABLET, FILM COATED ORAL at 09:37

## 2022-08-10 RX ADMIN — Medication 10 ML: at 09:20

## 2022-08-10 RX ADMIN — HYDRALAZINE HYDROCHLORIDE 50 MG: 50 TABLET, FILM COATED ORAL at 06:31

## 2022-08-10 RX ADMIN — GUAIFENESIN 1200 MG: 600 TABLET, EXTENDED RELEASE ORAL at 09:21

## 2022-08-10 RX ADMIN — PREDNISONE 5 MG: 10 TABLET ORAL at 09:20

## 2022-08-10 RX ADMIN — METOPROLOL TARTRATE 12.5 MG: 25 TABLET ORAL at 09:20

## 2022-08-10 RX ADMIN — FUROSEMIDE 20 MG: 20 TABLET ORAL at 09:20

## 2022-08-10 RX ADMIN — GABAPENTIN 300 MG: 300 CAPSULE ORAL at 09:20

## 2022-08-10 RX ADMIN — FAMOTIDINE 10 MG: 20 TABLET ORAL at 09:20

## 2022-08-10 RX ADMIN — LORAZEPAM 0.5 MG: 0.5 TABLET ORAL at 09:37

## 2022-08-10 RX ADMIN — SODIUM BICARBONATE 650 MG: 650 TABLET ORAL at 09:20

## 2022-08-10 RX ADMIN — TAMSULOSIN HYDROCHLORIDE 0.4 MG: 0.4 CAPSULE ORAL at 09:20

## 2022-08-10 RX ADMIN — LEVOTHYROXINE SODIUM 25 MCG: 0.03 TABLET ORAL at 06:31

## 2022-08-10 RX ADMIN — ROSUVASTATIN CALCIUM 5 MG: 5 TABLET, FILM COATED ORAL at 09:20

## 2022-08-10 RX ADMIN — APIXABAN 5 MG: 5 TABLET, FILM COATED ORAL at 09:20

## 2022-08-10 RX ADMIN — INSULIN LISPRO 3 UNITS: 100 INJECTION, SOLUTION INTRAVENOUS; SUBCUTANEOUS at 12:43

## 2022-08-10 RX ADMIN — AMLODIPINE BESYLATE 10 MG: 10 TABLET ORAL at 09:20

## 2022-08-10 RX ADMIN — FLUTICASONE PROPIONATE 2 SPRAY: 50 SPRAY, METERED NASAL at 09:20

## 2022-08-10 NOTE — DISCHARGE SUMMARY
Date of Admission: 8/5/2022  Date of Discharge:  8/10/2022  Primary Care Physician: Mariah Hickman MD     Discharge Diagnosis:  Active Hospital Problems    Diagnosis  POA   • **COVID-19 virus infection [U07.1]  Yes   • Acute DVT (deep venous thrombosis) (HCC) [I82.409]  Unknown   • Anemia, chronic disease [D63.8]  Yes   • Chronic heart failure with preserved ejection fraction (HCC) [I50.32]  Yes   • Type 2 diabetes mellitus with hyperglycemia (HCC) [E11.65]  Yes   • Malignant neoplasm of uterus (HCC) [C55]  Yes   • Pulmonary hypertension (HCC) [I27.20]  Yes   • Paroxysmal atrial fibrillation (HCC) [I48.0]  Yes   • Chronic kidney disease [N18.9]  Yes      Resolved Hospital Problems   No resolved problems to display.       DETAILS OF HOSPITAL STAY     Pertinent Test Results and Procedures Performed    Chest x-ray showed no acute infiltrate, consolidation, or effusion    Bilateral lower extremity Doppler:  · Acute right lower extremity deep vein thrombosis noted in the soleal.   · Acute left lower extremity deep vein thrombosis noted in the soleal.   · All other veins appeared normal bilaterally.     CT scan of the chest:  Extensive vascular calcification and evidence of previous   sternotomy. No acute cardiopulmonary abnormality is evident.     Lung perfusion scan:  No evidence on perfusion imaging to suggest pulmonary   thromboembolic disease.     Hospital Course  This is a very pleasant 89-year-old female with recent history of COVID-19 for which she was admitted and treated with Decadron (remdesivir not used due to longer duration of symptoms).  She improved and was discharged home where she has done poorly with increasing weakness, fatigue, shortness of breath, cough and congestion.  She has been having some subjective fevers and chills but her family states it was nothing like it was prior to her first admission when she was saturating the bed sheets with sweat.  She represented to the emergency room  given worsening symptoms and was admitted.  Work-up was conducted for secondary issues beyond just the COVID-19 pneumonia for which she had been recently treated.  Chest CT showed no acute abnormality.  Bilateral lower extremity Doppler did reveal small bilateral DVTs as described above.  Could not perform a CT angiogram of the chest given her chronic kidney disease and lung perfusion scan was obtained which was negative.  She was started on anticoagulation in the form of Lovenox and transition to Eliquis.  She does have a history of thrombocytopenia but this has been stable throughout the duration of her anticoagulation.  She was provided aggressive pulmonary hygiene.  I did place her back on a couple days worth of Decadron but have since transitioned back to her home dose of prednisone which she takes for the thrombocytopenia.  She remained on room air throughout her hospitalization is now feeling better.  Breath sounds have improved and she is now clear on exam.  She did have a spike of her blood sugars but this is now resolved and she is on her home regimen.  Blood pressure is running on the high side and I have added moderate dose hydralazine to her regimen and she should follow-up with her PCP/cardiologist in the outpatient setting.  I have recommended to the family that she have a repeat lower extremity Doppler in 6 weeks to reevaluate and determine need for further anticoagulation.  This can be arranged through her primary care physician.  At this point she is medically stable, improved overall and will be discharged back home with her family.  I discussed all this with her daughter today who is in agreement with this plan.  Home health will be ordered for nursing visit and PT.    Physical Exam at Discharge:  General: No acute distress, AAOx3, elderly, deconditioned appearing  HEENT: EOMI, PERRL  Cardiovascular: +s1 and s2, RRR  Lungs: No rhonchi or wheezing  Abdomen: soft, nontender    Consults:   Consults      Date and Time Order Name Status Description    8/6/2022  1:18 AM [facility] (on-call MD unless specified) Details      8/1/2022 11:51 AM Inpatient Urology Consult Completed     7/30/2022  2:51 PM Inpatient Cardiology Consult Completed             Condition on Discharge: Stable, improved    Discharge Disposition  Home or Self Care    Discharge Medications     Discharge Medications      New Medications      Instructions Start Date   apixaban 5 MG tablet tablet  Commonly known as: ELIQUIS   Take 1 tablet by mouth Every 12 (Twelve) Hours for 30 days.      hydrALAZINE 50 MG tablet  Commonly known as: APRESOLINE   50 mg, Oral, Every 8 Hours Scheduled      insulin glargine 100 UNIT/ML injection  Commonly known as: LANTUS, SEMGLEE   30 Units, Subcutaneous, Nightly         Changes to Medications      Instructions Start Date   predniSONE 5 MG tablet  Commonly known as: DELTASONE  What changed: See the new instructions.   Take 2 tablets by mouth Daily for 3 days, THEN 2 tablets Every Other Day for 3 days.   Start Date: August 10, 2022        Continue These Medications      Instructions Start Date   acetaminophen 325 MG tablet  Commonly known as: TYLENOL   650 mg, Oral, Every 6 Hours PRN      amLODIPine 10 MG tablet  Commonly known as: NORVASC   1 tablet, Oral, Daily      famotidine 10 MG tablet  Commonly known as: PEPCID   10 mg, Oral, 2 Times Daily      furosemide 20 MG tablet  Commonly known as: LASIX   20 mg, Oral, Daily      gabapentin 400 MG capsule  Commonly known as: NEURONTIN   400 mg, Oral, 2 Times Daily      insulin lispro 100 UNIT/ML injection  Commonly known as: humaLOG   0-10 Units, Subcutaneous, 3 Times Daily Before Meals, 2 units for every 50 > 150      levothyroxine 25 MCG tablet  Commonly known as: SYNTHROID, LEVOTHROID   1 tablet, Oral, Daily      LORazepam 0.5 MG tablet  Commonly known as: ATIVAN   No dose, route, or frequency recorded.      metoprolol tartrate 25 MG tablet  Commonly known as: LOPRESSOR   12.5  mg, Oral, 2 Times Daily      montelukast 10 MG tablet  Commonly known as: SINGULAIR   1 tablet, Oral, Nightly      polyethylene glycol 17 GM/SCOOP powder  Commonly known as: MIRALAX   17 g, Oral, Daily      rosuvastatin 5 MG tablet  Commonly known as: CRESTOR   1 tablet, Oral, Daily      sodium bicarbonate 650 MG tablet   650 mg, Oral, 2 Times Daily      tamsulosin 0.4 MG capsule 24 hr capsule  Commonly known as: FLOMAX   0.4 mg, Oral, Every Night at Bedtime             Discharge Diet:   Diet Instructions     Diet: Regular, Consistent Carbohydrate      Discharge Diet:  Regular  Consistent Carbohydrate             Activity at Discharge:   Activity Instructions     Activity as Tolerated            Follow-up Appointments  Future Appointments   Date Time Provider Department Center   8/19/2022  2:00 PM LAB CHAIR [facility] [place]   8/19/2022  2:40 PM Althea Macedo APRN [facility] [place]   8/22/2022  2:00 PM [facility] FAIL [facility]IN [facility] [place]   9/2/2022  2:50 PM LAB CHAIR 3 [facility]  LAB [facility] [place]   9/2/2022  3:20 PM Pablo Sherman Jr., MD [facility] CBC [facility] [place]   9/27/2022  1:40 PM Beth Butcher MD [facility]  [facility] [place]     Additional Instructions for the Follow-ups that You Need to Schedule     Ambulatory Referral to Home Health (Hospital)   As directed      Face to Face Visit Date: 8/10/2022    Follow-up provider for Plan of Care?: I treated the patient in an acute care facility and will not continue treatment after discharge.    Follow-up provider: ERVIN WARD [[id number]]    Reason/Clinical Findings: weakness and deconditioning, COVID-19    Describe mobility limitations that make leaving home difficult: weakness and deconditioning, COVID-19    Nursing/Therapeutic Services Requested: Skilled Nursing Physical Therapy    Skilled nursing orders: Medication education Cardiopulmonary assessments    Frequency: 1 Week 1         Discharge Follow-up with PCP   As directed       Currently Documented  PCP:    Mariah iHckman MD    PCP Phone Number:    [phone]     Follow Up Details: 1 week                 I have examined and discussed discharge planning with the patient today.    I wore full protective equipment throughout the patient encounter including eye protection and facemask.  Hand hygiene was performed before donning protective equipment and after removal when leaving the room.     Ovidio Anguiano MD  08/10/22  11:48 EDT    Time: Discharge greater than 30 min

## 2022-08-10 NOTE — CASE MANAGEMENT/SOCIAL WORK
Case Management Discharge Note      Final Note: Home with family assist and [facility]          Selected Continued Care - Discharged on 8/10/2022 Admission date: 8/5/2022 - Discharge disposition: Home or Self Care    Destination    No services have been selected for the patient.              Durable Medical Equipment    No services have been selected for the patient.              Dialysis/Infusion    No services have been selected for the patient.              Home Medical Care Coordination complete.    Service Provider Selected Services Address Phone Fax Patient Preferred    [facility] [address], [address], [place] [address] [phone] --          Therapy    No services have been selected for the patient.              Community Resources    No services have been selected for the patient.              Community & DME    No services have been selected for the patient.                Selected Continued Care - Prior Encounters Includes selections from prior encounters from 5/7/2022 to 8/10/2022    Discharged on 8/2/2022 Admission date: 7/30/2022 - Discharge disposition[place] [facility]    [place] Medical Care     Service Provider Selected Services Address Phone Fax Patient Preferred    [facility] [address], [address], [place] [address] [phone] --                Discharged on 6/9/2022 Admission date: 6/5/2022 - Discharge disposition: Skilled Nursing Facility (DC - External)    Destination     Service Provider Selected Services Address Phone Fax Patient Preferred    [facility]  Sk[facility] Nursing [address] , [place] [address] [phone] --                    Transportation Services  Private: Car    Final Discharge Disposition Code: 06 - home with home health care

## 2022-08-11 ENCOUNTER — TELEPHONE (OUTPATIENT)
Dept: ONCOLOGY | Facility: CLINIC | Age: 87
End: 2022-08-11

## 2022-08-11 ENCOUNTER — READMISSION MANAGEMENT (OUTPATIENT)
Dept: CALL CENTER | Facility: HOSPITAL | Age: 87
End: 2022-08-11

## 2022-08-11 NOTE — OUTREACH NOTE
Prep Survey    Flowsheet Row Responses   [facility] patient discharged from? [place]   Is LACE score < 7 ? No   Emergency Room discharge w/ pulse ox? No   Eligibility Readm Mgmt   Discharge diagnosis COVID-19 virus infection Acute DVT Malignant neoplasm of uterus    Does the patient have one of the following disease processes/diagnoses(primary or secondary)? COVID-19   Does the patient have Home health ordered? Yes   What is the Home health agency?  [facility]   Is there a DME ordered? No   Prep survey completed? Yes          ANEL PINEDA - Registered Nurse

## 2022-08-11 NOTE — TELEPHONE ENCOUNTER
Pt's daughter says [facility]   Wants us to call them with lab orders for the patient's up coming appointment.  [phone] the nurse is RU at [phone]

## 2022-08-11 NOTE — TELEPHONE ENCOUNTER
"Returned call to Patient's daughter who is concerned about bringing her mother to the appointment with the NP on 8/19/2022.  She was recently discharged from the hospital with Covid and several blood clots.  Her daughter wants to know if it is absolutely necessary to have her come into the office on 8/19/2022, she doesn't want to \"drag her out\" to be seen if at all possible.  She wants to know if they can just keep the appointment with Dr. Sherman and have lab work done on 9/3/2022 as scheduled.  Please advise.  "

## 2022-08-11 NOTE — OUTREACH NOTE
COVID-19 Week 1 Survey    Flowsheet Row Responses   [facility] [place] patient discharged from? [place]   Does the patient have one of the following disease processes/diagnoses(primary or secondary)? COVID-19   COVID-19 underlying condition? CHF   Call Number Call 1   Week 1 Call successful? Yes   Call start time 1351   Call end time 1353   Discharge diagnosis COVID-19 virus infection Acute DVT Malignant neoplasm of uterus    Is patient permission given to speak with other caregiver? Yes   List who call center can speak with Daughter Radah    Person spoke with today (if not patient) and relationship Daughter Radha    Meds reviewed with patient/caregiver? Yes   Is the patient having any side effects they believe may be caused by any medication additions or changes? No   Does the patient have all medications ordered at discharge? Yes   Is the patient taking all medications as directed (includes completed medication regime)? Yes   Does the patient have a primary care provider?  Yes   Comments regarding PCP Has a followup with hematology on Aug 5. Dtr will make PCP appt.   Does the patient have an appointment with their PCP or specialist within 7 days of discharge? No   What is preventing the patient from scheduling follow up appointments within 7 days of discharge? Haven't had time   Nursing Interventions Educated patient on importance of making appointment, Advised patient to make appointment   Has the patient kept scheduled appointments due by today? N/A   What is the Home health agency?  [facility]   Has home health visited the patient within 72 hours of discharge? Yes   Home health comments Visited patient 8/4/22   Psychosocial issues? No   Nursing interventions Reviewed instructions with patient   What is the patient's perception of their health status since discharge? Improving   Does the patient have any of the following symptoms? Cough   Pulse Ox monitoring Intermittent   Pulse Ox device source Patient    O2 Sat comments O2 sat 98% on RA   O2 Sat: education provided Sat levels, Monitoring frequency, When to seek care   Is the patient/caregiver able to teach back steps to recovery at home? Set small, achievable goals for return to baseline health, Rest and rebuild strength, gradually increase activity, Eat a well-balance diet   If the patient is a current smoker, are they able to teach back resources for cessation? Not a smoker   Is the patient/caregiver able to teach back the hierarchy of who to call/visit for symptoms/problems? PCP, Specialist, Home health nurse, Urgent Care, ED, 911 Yes   Does the patient have scales? Yes   Is the patient weighing daily? Yes   Daily weight interventions Education provided on importance of daily weight   Is the patient able to teach back Heart Failure diet management? Yes   Is the patient able to teach back Heart Failure Zones? Yes   Is the patient able to teach back signs and symptoms of worsening condition? (i.e. weight gain, shortness of air, etc.) Yes   COVID-19 call completed? Yes          CIERRA FIGUEROA - Registered Nurse

## 2022-08-11 NOTE — TELEPHONE ENCOUNTER
Lab orders faxed to [facility] at Fax #[phone].  Daughter notified that [facility] will draw the blood on 8/15/2022 and we will change her to a Video/phone visit with NP per Dr. Sherman's request.

## 2022-08-13 ENCOUNTER — READMISSION MANAGEMENT (OUTPATIENT)
Dept: CALL CENTER | Facility: HOSPITAL | Age: 87
End: 2022-08-13

## 2022-08-13 NOTE — OUTREACH NOTE
COVID-19 Week 1 Survey    Flowsheet Row Responses   [facility] patient discharged from? [place]   Does the patient have one of the following disease processes/diagnoses(primary or secondary)? COVID-19   COVID-19 underlying condition? CHF   Call Number Call 2   Week 1 Call successful? Yes   Call start time 1220   Call end time 1225   Discharge diagnosis COVID-19 virus infection Acute DVT Malignant neoplasm of uterus    Person spoke with today (if not patient) and relationship patient/ Daughter Margaret   Meche reviewed with patient/caregiver? Yes   Is the patient having any side effects they believe may be caused by any medication additions or changes? No   Does the patient have all medications ordered at discharge? Yes   Is the patient taking all medications as directed (includes completed medication regime)? Yes   Does the patient have a primary care provider?  Yes   Comments regarding PCP Dr. Hickman pt will on tuesday. 08/16   What is the [facility] health agency?  [facility]    Has home health visited the patient within 72 hours of discharge? Yes   Psychosocial issues? No   Did the patient receive a copy of their discharge instructions? Yes   Did the patient receive a copy of COVID-19 specific instructions? Yes   Nursing interventions Reviewed instructions with patient   What is the patient's perception of their health status since discharge? Improving   Does the patient have any of the following symptoms? Cough   Nursing Interventions Nurse provided patient education   Pulse Ox monitoring Intermittent   Pulse Ox device source Patient   O2 Sat comments O2 sat 98% on RA   O2 Sat: education provided Sat levels, Monitoring frequency, When to seek care   Is the patient/caregiver able to teach back steps to recovery at home? Set small, achievable goals for return to baseline health, Rest and rebuild strength, gradually increase activity   If the patient is a current smoker, are they able to teach back resources  for cessation? Not a smoker   Is the patient/caregiver able to teach back the hierarchy of who to call/visit for symptoms/problems? PCP, Specialist, Home health nurse, Urgent Care, ED, 911 Yes   Does the patient have scales? Yes   Is the patient weighing daily? Yes   Is the patient able to teach back signs and symptoms of worsening condition? (i.e. weight gain, shortness of air, etc.) Yes   COVID-19 call completed? Yes   Wrap up additional comments Patient has lost her voice.  Goyo Robles states she still has a cough but is breathing well with saturations averaging 98.  Denies needs or questions at this time.          ERNESTO LEVINE - Registered Nurse

## 2022-08-19 ENCOUNTER — TELEPHONE (OUTPATIENT)
Dept: ONCOLOGY | Facility: CLINIC | Age: 87
End: 2022-08-19

## 2022-08-19 ENCOUNTER — APPOINTMENT (OUTPATIENT)
Dept: LAB | Facility: HOSPITAL | Age: 87
End: 2022-08-19

## 2022-08-22 ENCOUNTER — OFFICE VISIT (OUTPATIENT)
Dept: ONCOLOGY | Facility: CLINIC | Age: 87
End: 2022-08-22

## 2022-08-22 ENCOUNTER — LAB (OUTPATIENT)
Dept: LAB | Facility: HOSPITAL | Age: 87
End: 2022-08-22

## 2022-08-22 ENCOUNTER — TELEPHONE (OUTPATIENT)
Dept: ONCOLOGY | Facility: CLINIC | Age: 87
End: 2022-08-22

## 2022-08-22 ENCOUNTER — HOSPITAL ENCOUNTER (OUTPATIENT)
Dept: CARDIOLOGY | Facility: HOSPITAL | Age: 87
Discharge: HOME | End: 2022-08-22

## 2022-08-22 ENCOUNTER — READMISSION MANAGEMENT (OUTPATIENT)
Dept: CALL CENTER | Facility: HOSPITAL | Age: 87
End: 2022-08-22

## 2022-08-22 VITALS
HEIGHT: 57 IN | DIASTOLIC BLOOD PRESSURE: 50 MMHG | BODY MASS INDEX: 33.44 KG/M2 | WEIGHT: 155 LBS | SYSTOLIC BLOOD PRESSURE: 151 MMHG | HEART RATE: 70 BPM | OXYGEN SATURATION: 97 %

## 2022-08-22 VITALS
RESPIRATION RATE: 16 BRPM | HEART RATE: 79 BPM | WEIGHT: 155 LBS | SYSTOLIC BLOOD PRESSURE: 153 MMHG | BODY MASS INDEX: 33.44 KG/M2 | OXYGEN SATURATION: 99 % | HEIGHT: 57 IN | TEMPERATURE: 97.3 F | DIASTOLIC BLOOD PRESSURE: 60 MMHG

## 2022-08-22 DIAGNOSIS — D69.6 THROMBOCYTOPENIA: Primary | ICD-10-CM

## 2022-08-22 DIAGNOSIS — D69.3 ACUTE ITP: ICD-10-CM

## 2022-08-22 DIAGNOSIS — E11.65 TYPE 2 DIABETES MELLITUS WITH HYPERGLYCEMIA, WITH LONG-TERM CURRENT USE OF INSULIN: ICD-10-CM

## 2022-08-22 DIAGNOSIS — Z79.899 HIGH RISK MEDICATION USE: ICD-10-CM

## 2022-08-22 DIAGNOSIS — I82.4Z3 DVT, LOWER EXTREMITY, DISTAL, ACUTE, BILATERAL: ICD-10-CM

## 2022-08-22 DIAGNOSIS — D69.3 ACUTE ITP: Primary | ICD-10-CM

## 2022-08-22 DIAGNOSIS — Z79.4 TYPE 2 DIABETES MELLITUS WITH HYPERGLYCEMIA, WITH LONG-TERM CURRENT USE OF INSULIN: ICD-10-CM

## 2022-08-22 DIAGNOSIS — I50.32 CHRONIC HEART FAILURE WITH PRESERVED EJECTION FRACTION: Primary | ICD-10-CM

## 2022-08-22 DIAGNOSIS — I48.0 PAROXYSMAL ATRIAL FIBRILLATION: ICD-10-CM

## 2022-08-22 DIAGNOSIS — I10 ESSENTIAL HYPERTENSION: ICD-10-CM

## 2022-08-22 DIAGNOSIS — D69.6 THROMBOCYTOPENIA: ICD-10-CM

## 2022-08-22 PROBLEM — R53.1 WEAKNESS GENERALIZED: Status: RESOLVED | Noted: 2022-07-30 | Resolved: 2022-08-22

## 2022-08-22 PROBLEM — U07.1 COVID-19 VIRUS INFECTION: Status: RESOLVED | Noted: 2022-08-06 | Resolved: 2022-08-22

## 2022-08-22 PROBLEM — U07.1 COVID-19: Status: RESOLVED | Noted: 2022-07-30 | Resolved: 2022-08-22

## 2022-08-22 PROBLEM — R33.9 URINE RETENTION: Status: RESOLVED | Noted: 2022-08-02 | Resolved: 2022-08-22

## 2022-08-22 PROBLEM — I82.409 ACUTE DVT (DEEP VENOUS THROMBOSIS): Status: RESOLVED | Noted: 2022-08-07 | Resolved: 2022-08-22

## 2022-08-22 LAB
ALBUMIN SERPL-MCNC: 3.7 G/DL (ref 3.5–5.2)
ALBUMIN/GLOB SERPL: 1.4 G/DL
ALP SERPL-CCNC: 69 U/L (ref 39–117)
ALT SERPL W P-5'-P-CCNC: 18 U/L (ref 1–33)
ANION GAP SERPL CALCULATED.3IONS-SCNC: 13.6 MMOL/L (ref 5–15)
AST SERPL-CCNC: 16 U/L (ref 1–32)
BASOPHILS # BLD AUTO: 0.02 10*3/MM3 (ref 0–0.2)
BASOPHILS NFR BLD AUTO: 0.3 % (ref 0–1.5)
BILIRUB SERPL-MCNC: 0.5 MG/DL (ref 0–1.2)
BUN SERPL-MCNC: 23 MG/DL (ref 8–23)
BUN/CREAT SERPL: 16.4 (ref 7–25)
CALCIUM SPEC-SCNC: 9 MG/DL (ref 8.2–9.6)
CHLORIDE SERPL-SCNC: 103 MMOL/L (ref 98–107)
CO2 SERPL-SCNC: 22.4 MMOL/L (ref 22–29)
CREAT SERPL-MCNC: 1.4 MG/DL (ref 0.57–1)
DEPRECATED RDW RBC AUTO: 45.8 FL (ref 37–54)
EGFRCR SERPLBLD CKD-EPI 2021: 35.6 ML/MIN/1.73
EOSINOPHIL # BLD AUTO: 0.06 10*3/MM3 (ref 0–0.4)
EOSINOPHIL NFR BLD AUTO: 0.8 % (ref 0.3–6.2)
ERYTHROCYTE [DISTWIDTH] IN BLOOD BY AUTOMATED COUNT: 14.7 % (ref 12.3–15.4)
GLOBULIN UR ELPH-MCNC: 2.6 GM/DL
GLUCOSE SERPL-MCNC: 127 MG/DL (ref 65–99)
HCT VFR BLD AUTO: 36.7 % (ref 34–46.6)
HGB BLD-MCNC: 12 G/DL (ref 12–15.9)
IMM GRANULOCYTES # BLD AUTO: 0.07 10*3/MM3 (ref 0–0.05)
IMM GRANULOCYTES NFR BLD AUTO: 0.9 % (ref 0–0.5)
LYMPHOCYTES # BLD AUTO: 0.96 10*3/MM3 (ref 0.7–3.1)
LYMPHOCYTES NFR BLD AUTO: 13 % (ref 19.6–45.3)
MCH RBC QN AUTO: 28.3 PG (ref 26.6–33)
MCHC RBC AUTO-ENTMCNC: 32.7 G/DL (ref 31.5–35.7)
MCV RBC AUTO: 86.6 FL (ref 79–97)
MONOCYTES # BLD AUTO: 0.67 10*3/MM3 (ref 0.1–0.9)
MONOCYTES NFR BLD AUTO: 9.1 % (ref 5–12)
NEUTROPHILS NFR BLD AUTO: 5.6 10*3/MM3 (ref 1.7–7)
NEUTROPHILS NFR BLD AUTO: 75.9 % (ref 42.7–76)
NRBC BLD AUTO-RTO: 0.1 /100 WBC (ref 0–0.2)
NT-PROBNP SERPL-MCNC: 3433 PG/ML (ref 0–1800)
PLATELET # BLD AUTO: 61 10*3/MM3 (ref 140–450)
PLATELET # BLD AUTO: 61 10*3/MM3 (ref 140–450)
PLATELETS.RETICULATED NFR BLD AUTO: 6.3 % (ref 0.9–6.5)
PMV BLD AUTO: 10.3 FL (ref 6–12)
POTASSIUM SERPL-SCNC: 4 MMOL/L (ref 3.5–5.2)
PROT SERPL-MCNC: 6.3 G/DL (ref 6–8.5)
RBC # BLD AUTO: 4.24 10*6/MM3 (ref 3.77–5.28)
SODIUM SERPL-SCNC: 139 MMOL/L (ref 136–145)
WBC NRBC COR # BLD: 7.38 10*3/MM3 (ref 3.4–10.8)

## 2022-08-22 PROCEDURE — 99495 TRANSJ CARE MGMT MOD F2F 14D: CPT | Performed by: NURSE PRACTITIONER

## 2022-08-22 PROCEDURE — 85025 COMPLETE CBC W/AUTO DIFF WBC: CPT

## 2022-08-22 PROCEDURE — 99214 OFFICE O/P EST MOD 30 MIN: CPT | Performed by: NURSE PRACTITIONER

## 2022-08-22 PROCEDURE — 1111F DSCHRG MED/CURRENT MED MERGE: CPT | Performed by: NURSE PRACTITIONER

## 2022-08-22 PROCEDURE — 83880 ASSAY OF NATRIURETIC PEPTIDE: CPT

## 2022-08-22 PROCEDURE — 85055 RETICULATED PLATELET ASSAY: CPT

## 2022-08-22 PROCEDURE — G0463 HOSPITAL OUTPT CLINIC VISIT: HCPCS

## 2022-08-22 PROCEDURE — 80053 COMPREHEN METABOLIC PANEL: CPT

## 2022-08-22 PROCEDURE — 36415 COLL VENOUS BLD VENIPUNCTURE: CPT

## 2022-08-22 RX ORDER — CARVEDILOL 12.5 MG/1
12.5 TABLET ORAL 2 TIMES DAILY
Qty: 60 TABLET | Refills: 11 | Status: ON HOLD | OUTPATIENT
Start: 2022-08-22

## 2022-08-22 RX ORDER — PREDNISONE 10 MG/1
10 TABLET ORAL DAILY
COMMUNITY
End: 2022-08-22

## 2022-08-22 RX ORDER — PREDNISONE 1 MG/1
15 TABLET ORAL
Qty: 40 TABLET | Refills: 1 | Status: SHIPPED | OUTPATIENT
Start: 2022-08-22 | End: 2022-08-29 | Stop reason: HOSPADM

## 2022-08-22 NOTE — OUTREACH NOTE
COVID-19 Week 1 Survey    Flowsheet Row Responses   [facility] patient discharged from? [place]   Does the patient have one of the following disease processes/diagnoses(primary or secondary)? COVID-19   COVID-19 underlying condition? CHF   Call start time 1508   Call end time 1509   Discharge diagnosis COVID-19 virus infection Acute DVT Malignant neoplasm of uterus    Person spoke with today (if not patient) and relationship Daughter Radha Mcgregor reviewed with patient/caregiver? Yes   Is the patient having any side effects they believe may be caused by any medication additions or changes? No   Does the patient have all medications ordered at discharge? Yes   Is the patient taking all medications as directed (includes completed medication regime)? Yes   Comments regarding appointments Dr. Sherman currently   Does the patient have a primary care provider?  Yes   Comments regarding PCP Dr. Hickman pt will on tuesday. 08/16   Does the patient have an appointment with their PCP or specialist within 7 days of discharge? Greater than 7 days   Has the patient kept scheduled appointments due by today? Yes   What is the Home health agency?  [facility]    Has home health visited the patient within 72 hours of discharge? Yes   Psychosocial issues? No   Wrap up additional comments brief call.  Daughter is with patient at Doctor's office at time of call.  Denies questions.          ERNESTO LEVINE - Registered Nurse

## 2022-08-22 NOTE — PROGRESS NOTES
[facility] HEART FAILURE      Patient Name: Helena Carmen  :[id number]  Age: 89 y.o.  Sex: female  Referring Provider: Beth Butcher MD   Primary Cardiologist: Beth Butcher MD  Encounter Provider:  RIGO Lowe      Chief Complaint:   Chief Complaint   Patient presents with   • Congestive Heart Failure         Congestive Heart Failure  Associated symptoms include fatigue and shortness of breath. Pertinent negatives include no chest pain, palpitations or unexpected weight change.    this 89-year-old female, new to this provider, comes today for further evaluation regarding her chronic diastolic heart failure.  Current diagnoses to include CKD 4, aortic valve disease status post history of AVR with tissue valve, hyperlipidemia, hypertension, hiatal hernia, legally blind, paroxysmal atrial fibrillation, pulmonary hypertension, type 2 diabetes mellitus, history of uterine cancer.    Her history of diastolic dysfunction goes back as far as at least .  Repeat echocardiogram 2019 revealed LVEF of 56% with grade 2 LV diastolic dysfunction, RVSP of 69 mmHg, trivial pericardial effusion noted at that time thus Lasix was started.    It is noted that during appointment with RIGO late 2022 that she had been off of spironolactone for 1 to 2 weeks and with that had increased bilateral lower extremity edema as well as abdominal distention.  She received 1 dose of IV Lasix in clinic on 2022 and oral Lasix was restarted at 40 mg twice daily.  Nephrology, of note, stopped losartan, increased hydralazine, and decreased Lasix on 2022.    Admission from 3/2/2022 to 3/9/2022 was noted with acute on chronic exacerbation of her diastolic heart failure and acute kidney injury.  She has since been treated with Lasix 40 mg every other day and daily spironolactone per nephrology recommendation.    She was rehospitalized in 2022 and has since been staying at [facility].  She  was readmitted again 8/5-8/10/2022 with COVID 19 pneumonia and found to have small bilateral DVTs.  She was treated with lovenox and transitioned to Eliquis. During admission she was found to be hypertensive.     She is currently complaining of shortness of air that is stubbornly clinging on.  She has been using mucinex for some upper airway congestion, and began exercising yesterday.       The following portions of the patient's history were reviewed and updated as appropriate: allergies, current medications, past family history, past medical history, past social history, past surgical history and problem list.    Current Outpatient Medications   Medication Sig Dispense Refill   • acetaminophen (TYLENOL) 325 MG tablet Take 650 mg by mouth Every 6 (Six) Hours As Needed.     • amLODIPine (NORVASC) 10 MG tablet Take 1 tablet by mouth daily.     • apixaban (ELIQUIS) 5 MG tablet tablet Take 1 tablet by mouth Every 12 (Twelve) Hours for 30 days. 60 tablet 0   • famotidine (PEPCID) 10 MG tablet Take 10 mg by mouth 2 (Two) Times a Day.     • furosemide (LASIX) 20 MG tablet Take 1 tablet by mouth Daily. 30 tablet 11   • gabapentin (NEURONTIN) 400 MG capsule Take 400 mg by mouth 2 (Two) Times a Day.     • hydrALAZINE (APRESOLINE) 50 MG tablet Take 1 tablet by mouth Every 8 (Eight) Hours for 30 days. 90 tablet 0   • insulin glargine (LANTUS, SEMGLEE) 100 UNIT/ML injection Inject 30 Units under the skin into the appropriate area as directed Every Night.  12   • insulin lispro (humaLOG) 100 UNIT/ML injection Inject 0-10 Units under the skin into the appropriate area as directed 3 (Three) Times a Day Before Meals. 2 units for every 50 > 150     • levothyroxine (SYNTHROID, LEVOTHROID) 25 MCG tablet Take 1 tablet by mouth daily.     • LORazepam (ATIVAN) 0.5 MG tablet      • montelukast (SINGULAIR) 10 MG tablet Take 1 tablet by mouth Every Night.     • polyethylene glycol (MIRALAX) 17 GM/SCOOP powder Take 17 g by mouth Daily.     •  predniSONE (DELTASONE) 10 MG tablet Take 10 mg by mouth Daily.     • rosuvastatin (CRESTOR) 5 MG tablet Take 1 tablet by mouth Daily.     • sodium bicarbonate 650 MG tablet Take 1 tablet by mouth 2 (Two) Times a Day. 60 tablet 0   • tamsulosin (FLOMAX) 0.4 MG capsule 24 hr capsule Take 0.4 mg by mouth every night at bedtime.     • carvedilol (COREG) 12.5 MG tablet Take 1 tablet by mouth 2 (Two) Times a Day. 60 tablet 11     No current facility-administered medications for this encounter.       Past Medical History:   Diagnosis Date   • Aortic valve stenosis     s/p tissue AVR   • Back pain    • CKD (chronic kidney disease)    • Colitis due to Clostridioides difficile 01/26/2022   • Diastolic dysfunction     Grade 2 per echocardiogram 2013   • Diverticulosis    • Exertional shortness of breath    • Heart disease    • Hiatal hernia    • Hyperlipidemia    • Hypertension    • Hyperthyroidism    • Hypertriglyceridemia 05/31/2018   • Hypothyroidism    • Left ventricular hypertrophy    • Legally blind    • Liver disease    • Macular degeneration    • Mitral regurgitation    • Osteoarthritis of hip    • Pancreatitis 01/26/2022   • Paroxysmal atrial fibrillation (HCC)    • Premature ventricular contractions    • Pulmonary hypertension (HCC)    • Renal insufficiency syndrome    • Type 2 diabetes mellitus (HCC)    • Uterine cancer (HCC)        Past Surgical History:   Procedure Laterality Date   • AORTIC VALVE REPAIR/REPLACEMENT     • CATARACT EXTRACTION      1970, 1999   • ENDOSCOPY  08/15/2014    no gross lesions in stomach/duodenum, erythrematous mucosa in stomach   • HYSTERECTOMY  2007   • STERNOTOMY         Physical Exam  Vitals and nursing note reviewed.   Constitutional:       General: She is not in acute distress.     Appearance: She is well-developed. She is obese. She is not ill-appearing.   HENT:      Head: Normocephalic and atraumatic.   Eyes:      Conjunctiva/sclera: Conjunctivae normal.      Pupils: Pupils are  "equal, round, and reactive to light.   Neck:      Vascular: No JVD.   Cardiovascular:      Rate and Rhythm: Normal rate and regular rhythm.      Heart sounds: Normal heart sounds. No murmur heard.    No friction rub. No gallop.   Pulmonary:      Effort: Pulmonary effort is normal. No respiratory distress.      Breath sounds: Normal breath sounds.   Abdominal:      General: Bowel sounds are normal. There is no distension.      Palpations: Abdomen is soft.   Musculoskeletal:         General: No swelling or deformity.   Skin:     General: Skin is warm and dry.      Capillary Refill: Capillary refill takes less than 2 seconds.   Neurological:      Mental Status: She is alert and oriented to person, place, and time. Mental status is at baseline.   Psychiatric:         Mood and Affect: Mood normal.         Behavior: Behavior normal.          Review of Systems   Constitutional: Positive for fatigue. Negative for unexpected weight change.   HENT: Negative for congestion and nosebleeds.    Eyes: Negative for photophobia and visual disturbance.   Respiratory: Positive for cough and shortness of breath. Negative for chest tightness.    Cardiovascular: Negative for chest pain, palpitations and leg swelling.   Gastrointestinal: Negative for abdominal distention and blood in stool.   Endocrine: Negative for polyphagia and polyuria.   Genitourinary: Positive for frequency. Negative for urgency.   Musculoskeletal: Negative for joint swelling and myalgias.   Skin: Negative for pallor and rash.   Neurological: Positive for weakness. Negative for dizziness, syncope, light-headedness, numbness and headaches.   Hematological: Does not bruise/bleed easily.   Psychiatric/Behavioral: Positive for sleep disturbance. Negative for confusion.        OBJECTIVE:  /50 (BP Location: Right arm, Patient Position: Sitting)   Pulse 70   Ht 144.8 cm (57.01\")   Wt 70.3 kg (155 lb) Comment: at home per patient  SpO2 97%   BMI 33.53 kg/m²  "     Body mass index is 33.53 kg/m².  Wt Readings from Last 1 Encounters:   08/22/22 70.3 kg (155 lb)       Lab Review:  Renal Function: Estimated Creatinine Clearance: 24.7 mL/min (A) (by C-G formula based on SCr of 1.3 mg/dL (H)).    Lab Results   Component Value Date    PROBNP 3,939.0 (H) 08/07/2022       Results for orders placed during the hospital encounter of 07/30/22    Adult Transthoracic Echo Complete W/ Cont if Necessary Per Protocol    Interpretation Summary  · Left ventricular ejection fraction appears to be 61 - 65%. Left ventricular systolic function is normal.  · Left ventricular wall thickness is consistent with mild concentric hypertrophy.  · Left ventricular diastolic function is consistent with (grade II w/high LAP) pseudonormalization.  · Normal right ventricular cavity size and systolic function noted.  · The left atrial cavity is moderately dilated.  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 The aortic valve peak and mean gradients are within defined limits. The prosthetic aortic valve is grossly normal.  · There is severe mitral annular calcification. The mitral valve leaflets are thickened. There are several calcified chordae  · Mild mitral valve regurgitation is present. No significant mitral valve stenosis is present.  · Mild to moderate tricuspid valve regurgitation is present.  · Calculated right ventricular systolic pressure from tricuspid regurgitation is 37 mmHg.  · There is no evidence of pericardial effusion      Procedures      6 MINUTE WALK                      Cardiac Procedures:  1. N/A       Previously trialed diuretics  Lasix      Previously trialed GDMT    Spironolactone  Losartan  Carvedilol     ASSESSMENT:     Diagnosis Plan   1. Chronic heart failure with preserved ejection fraction (HCC)  CBC & Differential    Comprehensive Metabolic Panel    BNP   2. Type 2 diabetes mellitus with hyperglycemia, with long-term current use of insulin ([facility])     3. Paroxysmal atrial  fibrillation (HCC)     4. Essential hypertension           PLAN OF CARE:  1.  HFpEF-NYHA class III.  Most recent ejection fraction 60% per echocardiogram.  Renal function precludes GDMT.    She appears euvolemic on exam today, but continues to struggle with SOA while recovering from COVID 19 pna.  I would like to transition her metoprolol tartrate to carvedilol as her rate remains well controlled from an AF perspective, but BP is poorly controlled.  Labs today as below.  She is to continue a strict low sodium diet of 2,000 mg daily or less, fluid restriction of 1,500 mL daily, and remain adherent with daily weights.     Directions for when to call the clinic reviewed with the patient to include weight gain of 2 to 3 pounds in 24 hours, weight gain of 5 to 10 pounds within 7 days; worsening shortness of breath; worsening lower extremity edema or abdominal distention.    2.  Limited mobility-patient is wheelchair-bound.  This limits her activity level considerably.    3.  Hypertension-poorly controlled, transition to carvedilol as above.    4.  Hyperlipidemia- stable and controlled.     5.  Paroxysmal atrial fibrillation- rate and rhythm.  Currently on Lopressor.    6.  DM2- stable and well controlled on insulin.    7.  CKD 4-followed by nephrology.  Labs today as below.         Stop metoprolol; start carvedilol 12.5 mg twice daily; CBC/CMP/BNP; follow up in 2 weeks or sooner if needed        Advance Care Planning   Will discuss at subsequent visit      Thank you for allowing me to participate in the care of your patient,         RIGO Lowe  [facility] HEART FAILURE  08/22/22  14:47 EDT      **Leisa Disclaimer:**  Much of this encounter note is an electronic transcription/translation of spoken language to printed text. The electronic translation of spoken language may permit erroneous, or at times, nonsensical words or phrases to be inadvertently transcribed. Although I have reviewed the note for such  errors, some may still exist.

## 2022-08-22 NOTE — TELEPHONE ENCOUNTER
Returned call to patient's daughter. Patient is not feeling well today, due to an IBS flare up with frequent stools s/p suppository administration. Additionally, patient's daughter stated patient is having back pain. Reviewed with Althea SIERRA, who patient is scheduled to see today. Explained to patient's daughter that it is very important for patient to be seen in the office today to re-check platelet count and address acute issues patient is experiencing. Further explained that if patient's platelet count has dropped below 50,000, it would be unsafe for her to continue taking Eliquis. Patient's daughter v/u and stated she would bring patient in for her appointment today.

## 2022-08-22 NOTE — PROGRESS NOTES
"Chief Complaint  Thrombocytopenia, borderline B12 deficiency, CKD3/4, possible chronic liver disease; new bilateral lower extremity DVT in soleal secondary to COVID-19 infection.    Subjective        History of Present Illness  Patient returns the office today in short interval follow-up to repeat labs and for review.  She is continued on prednisone 10 mg daily since her phone conversation on Friday of last week.  She denies any known bleeding.  She continues taking Eliquis 5 mg twice daily.  She has been doing home physical therapy once per week.  Her daughter reports that she is quite sore from increased activity where she had been so sedentary following COVID.  Patient remains in a wheelchair today.  She has been seen at the heart failure clinic earlier today.      Objective   Vital Signs:   /72   Pulse 79   Temp 97.3 °F (36.3 °C) (Temporal)   Resp 16   Ht 144.8 cm (57.01\")   Wt 70.3 kg (155 lb)   SpO2 99%   BMI 33.53 kg/m²   No vitals as telephone visit today.  Physical Exam  Constitutional:       Comments: Seated in wheelchair, accompanied by daughter   Pulmonary:      Effort: Pulmonary effort is normal. No respiratory distress.   Musculoskeletal:      Right lower leg: Edema (trace) present.      Left lower leg: Edema (trace) present.   Skin:     Findings: Bruising (arms bilaterally) present.      Comments: Scant petechiae bilateral ankles   Neurological:      Mental Status: She is alert and oriented to person, place, and time.   Psychiatric:         Mood and Affect: Mood normal.         Behavior: Behavior normal.          Result Review : Reviewed CBC, CMP, IPF from today       Assessment and Plan     *Thrombocytopenia  · Patient with apparent longstanding history of thrombocytopenia  · Previous CT scan with suggestion of chronic liver disease/cirrhotic liver morphology, last CT 4/15/2019 with normal spleen  · Platelet count 3/22/2019 was 52,000 and on 1/26/2022 was 60,000  · On admission 3/2/2022, " platelet count 39,000  · Additional labs 3/3/2022 with IPF elevated at 12.2% indicative of peripheral destructive process and has remained elevated in the 10-12% range.  · Additional labs 3/3/2022 with positive BECCA at 1: 320 dilution with homogeneous pattern, negative BECCA panel  · On 3/4/2022, B12 low normal at 238, folate greater than 20, negative serum protein electrophoresis and immunoelectrophoresis with free kappa light chain 65.6, free lambda light chain 37.1 and free light chain ratio 1.77 (appropriate for degree of renal dysfunction), LDH borderline elevated 238  · CT abdomen and pelvis 3/8/2022 with liver morphology suspicious for chronic liver disease, spleen normal in size at 10.4 cm.  No other abnormalities.  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    · Unclear whether  improvement in thrombocytopenia was related to steroids, B12 replacement, or improvement in CHF/volume overload.  · Recommended decreasing prednisone dose down to 15 mg daily prior to transition to subacute nursing facility on 3/9/2022.  Patient however was discharged on prednisone 20 mg daily.  Requested that nursing facility forward labs weekly monitor platelet count and further gradually taper prednisone dose however this did not occur.  · On 3/23/2022, platelet count was 64,000.  Tapered prednisone from 20 down to 15 mg daily.  Intend to maintain platelet count above the 50-85220 range.  Consider additional treatment options with IVIG or rituximab if platelet count declines into the 30,000 range or below consistently.  · 3/31/2022 platelet count 65,000 and on 4/4/2022 prednisone was tapered down to 10 mg daily  · On 4/20/2022, platelet count of 96,000 and prednisone tapered down to 5 mg daily.  · Platelet count on 6/15/2022 92,000 with subsequent taper of prednisone to 5 mg every other day  · Patient  returns today in follow-up, currently receiving prednisone at 5 mg every other day last tapered on 6/15/2022.  We are attempting to maintain a platelet count at least above the 50-21200 range while tapering steroids.  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  There has been some consideration of underlying liver disease as a contributing factor however spleen has been normal in size on imaging studies.  Platelet count today is 53,000 with elevated IPF 10.5%.  She is not experiencing any bleeding difficulties.  She is now back home after leaving the subacute nursing facility and has been doing quite well, gradually continuing to improve in terms of her mobility.  She remains in a wheelchair today.  We discussed again today our goal of trying to maintain her platelet count in the current range 50-83300.  I am reluctant to taper her prednisone any further at this time and she remains on very low-dose prednisone at 5 mg every other day.  We will continue prednisone at this dose for now.    · 7/30/2022-8/2/2022 admission for COVID-19.  Platelet count ranged from 46,000-82,000  · Hospitalization 8/5/2022 to 8/10/2022 for COVID-19 with findings of acute deep vein thrombosis in bilateral lower extremities in the soleal.  Patient started on Lovenox and transition to Eliquis.  Platelet count remained greater than 86,000 during hospitalization.  Patient was given dexamethasone however transitioned back to home dose of prednisone.  · Telephone visit 8/19/2022 due to patient still recovering from COVID-19 having difficulty to transport into the office.  Care tenders obtained labs from our order on 8/15/2022 showing a platelet count of 56,000 at that time.  Patient continuing on Eliquis for her DVT as below.  She is also continuing on prednisone 5 mg every other day which was her home dose of prednisone prior to hospitalizations.  Prednisone increased to 10 mg daily.  · Patient returns 8/22/2022 for an in office  visit with platelet count today 61,000.  She has continued prednisone 10 mg daily since her phone conversation on Friday.  She denies any known bleeding though does note if she bleeds longer when stuck for labs.  She is quite fatigued today she has been to the heart failure clinic prior to our office visit.  She is doing home physical therapy once per week and trying to regain her strength through COVID.  Results discussed with Dr. Sherman today and we will increase her prednisone to 15 mg every a.m.  Patient encouraged take this with food in the mornings.  She will return in 1 week for CBC with nurse review and has already been scheduled to see Dr. Sherman on 9/2/2022 which we will keep.    *Acute right lower extremity DVT in the soleal and acute left lower extremity DVT in the soleal secondary to COVID infection with hospital admission 8/5/2022 to 8/10/2022.    · Patient started on Lovenox during hospitalization and transitioned to Eliquis.  · 8/19/2022 on telephone visit discussed labs from 8/15/2022 showing platelet count declined to 56,000.  Patient and her daughter deny any known bleeding or dark stools.  She will continue Eliquis currently with increasing her prednisone as discussed above.  · 8/22/2022 patient continues Eliquis 5 mg twice daily and denies bleeding    *Normocytic anemia  · Patient with new onset anemia today with hemoglobin of 11.1, MCV normal at 91.9  · Patient certainly has evidence of underlying CKD 3/4 which may be a contributing factor  · Additional labs on 4/20/2022 with hemoglobin 11.1, iron 31, ferritin 260.2, iron saturation 9%, TIBC 328, folate 16, B12 811.  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  · Hemoglobin currently normal as of 8/22/2022 at 12    *Borderline B12 deficiency  · B12 level on 3/4/2022 was 238  · Patient initiated B12 1000 mcg IM injection daily x3 days and oral B12 1000 mcg daily  · Labs on 4/20/2022 with B12 811  · Patient was receiving  oral B12 1000 mcg daily at the nursing facility, is not receiving oral B12 currently     *NADER/CKD3/4  · Baseline creatinine 1.5-2  · Creatinine on admission 3/2/2022 was 2.79, subsequently improved  · Patient continues routine follow-up with nephrology  · Creatinine 8/22/2022 1.4     *Acute on chronic diastolic CHF  · History of severe aortic stenosis status post aortic valve replacement in June 2013  · Patient admitted with progressive generalized weakness and dyspnea on exertion  · Patient receiving diuretics currently with Lasix 20 mg daily  · Patient was seen by heart failure clinic today     *Diabetes mellitus type 2  · Exacerbated by steroids, subsequently improved with steroid taper     *Possible chronic liver disease  · Prior CTs with notation of cirrhotic liver morphology  · CT abdomen pelvis 4/15/2019 showed liver with a mildly shrunken appearance concerning for chronic liver disease.  Spleen however was normal in size.  · LFTs normal on 3/2/2022  · CT abdomen and pelvis 3/8/2022 with liver morphology suspicious for chronic liver disease, spleen normal in size at 10.4 cm.  No other abnormalities.  · Significance of the liver appearance on scans is unclear.  · On 3/23/2022, elevated LFTs with ALT 46, AST 40, normal total bilirubin 0.4.  · LFTs normal on 8/22/2022    *GI prophylaxis  · Patient is currently receiving Pepcid 10 mg twice daily      *Pulmonary nodule  · Patient underwent [facility] at [facility] urology on 9/7/2021 that showed a 4 mm subpleural left lower lobe nodule  · Patient was scheduled for follow-up CT chest with thoracic surgery however declined to proceed with the scan.  Given her age and limited ability to treat a malignancy and with a high probability that this is a benign finding, scan was canceled.       Plan:  1. Increase prednisone to 15 mg daily every a.m. with food  2. Patient or family will for monitor for any signs or symptoms of bleeding and call the office.  3. Patient continues  Eliquis 5 mg twice daily.  4. Patient will return in 1 week for CBC and nurse review  5. Patient was already scheduled to see Dr. Sherman on 9/2/2022 and we will therefore keep that appointment.    Patient is on high risk medication that requires frequent monitoring.

## 2022-08-23 ENCOUNTER — TELEPHONE (OUTPATIENT)
Dept: CARDIOLOGY | Facility: CLINIC | Age: 87
End: 2022-08-23

## 2022-08-23 ENCOUNTER — TELEPHONE (OUTPATIENT)
Dept: ONCOLOGY | Facility: CLINIC | Age: 87
End: 2022-08-23

## 2022-08-23 NOTE — TELEPHONE ENCOUNTER
Returned call to patient's daughter. Patient had home health visit today and had been complaining of lower back and flank pain. Urine dip stick performed, showing blood in urine that was not visible to the eye, however showing no evidence of UTI. Patient had been instructed to call office with any evidence of bleeding due to having a low platelet count and being on Eliquis. Discussed with Althea SIERRA who saw the patient in the office yesterday. Per Althea Macedo, patient and daughter are to continue to monitor for any signs of bleeding and notify the office. Patient's daughter v/u.

## 2022-08-23 NOTE — ADDENDUM NOTE
Encounter addended by: Shikha Sanabria MA on: 8/23/2022 8:30 AM   Actions taken: Charge Capture section accepted

## 2022-08-23 NOTE — TELEPHONE ENCOUNTER
Lab results reviewed the patient.  No change to current plan of care.  All questions and concerns addressed at this time.  Understanding and agreement verbalized.    RIGO Lowe

## 2022-08-24 ENCOUNTER — APPOINTMENT (OUTPATIENT)
Dept: GENERAL RADIOLOGY | Facility: HOSPITAL | Age: 87
End: 2022-08-24

## 2022-08-24 ENCOUNTER — HOSPITAL ENCOUNTER (INPATIENT)
Facility: HOSPITAL | Age: 87
LOS: 4 days | Discharge: NURSING, REHAB OR LONG-TERM CARE | End: 2022-08-29
Attending: EMERGENCY MEDICINE

## 2022-08-24 ENCOUNTER — APPOINTMENT (OUTPATIENT)
Dept: CT IMAGING | Facility: HOSPITAL | Age: 87
End: 2022-08-24

## 2022-08-24 ENCOUNTER — APPOINTMENT (OUTPATIENT)
Dept: MRI IMAGING | Facility: HOSPITAL | Age: 87
End: 2022-08-24

## 2022-08-24 DIAGNOSIS — I48.0 PAROXYSMAL ATRIAL FIBRILLATION: ICD-10-CM

## 2022-08-24 DIAGNOSIS — E11.42 DIABETIC POLYNEUROPATHY ASSOCIATED WITH TYPE 2 DIABETES MELLITUS: ICD-10-CM

## 2022-08-24 DIAGNOSIS — R10.30 LOWER ABDOMINAL PAIN: ICD-10-CM

## 2022-08-24 DIAGNOSIS — S32.059A CLOSED FRACTURE OF FIFTH LUMBAR VERTEBRA, UNSPECIFIED FRACTURE MORPHOLOGY, INITIAL ENCOUNTER: Primary | ICD-10-CM

## 2022-08-24 DIAGNOSIS — G62.9 PERIPHERAL NERVE DISEASE: ICD-10-CM

## 2022-08-24 DIAGNOSIS — D69.6 THROMBOCYTOPENIA: ICD-10-CM

## 2022-08-24 LAB
ALBUMIN SERPL-MCNC: 3.5 G/DL (ref 3.5–5.2)
ALBUMIN/GLOB SERPL: 1.3 G/DL
ALP SERPL-CCNC: 65 U/L (ref 39–117)
ALT SERPL W P-5'-P-CCNC: 15 U/L (ref 1–33)
ANION GAP SERPL CALCULATED.3IONS-SCNC: 10.4 MMOL/L (ref 5–15)
AST SERPL-CCNC: 15 U/L (ref 1–32)
BILIRUB SERPL-MCNC: 0.6 MG/DL (ref 0–1.2)
BILIRUB UR QL STRIP: NEGATIVE
BUN SERPL-MCNC: 24 MG/DL (ref 8–23)
BUN/CREAT SERPL: 17.8 (ref 7–25)
CALCIUM SPEC-SCNC: 8.9 MG/DL (ref 8.2–9.6)
CHLORIDE SERPL-SCNC: 102 MMOL/L (ref 98–107)
CLARITY UR: CLEAR
CO2 SERPL-SCNC: 23.6 MMOL/L (ref 22–29)
COLOR UR: YELLOW
CREAT SERPL-MCNC: 1.35 MG/DL (ref 0.57–1)
DEPRECATED RDW RBC AUTO: 45.5 FL (ref 37–54)
EGFRCR SERPLBLD CKD-EPI 2021: 37.2 ML/MIN/1.73
ERYTHROCYTE [DISTWIDTH] IN BLOOD BY AUTOMATED COUNT: 14.4 % (ref 12.3–15.4)
GLOBULIN UR ELPH-MCNC: 2.6 GM/DL
GLUCOSE BLDC GLUCOMTR-MCNC: 224 MG/DL (ref 70–130)
GLUCOSE SERPL-MCNC: 222 MG/DL (ref 65–99)
GLUCOSE UR STRIP-MCNC: NEGATIVE MG/DL
HCT VFR BLD AUTO: 35.8 % (ref 34–46.6)
HGB BLD-MCNC: 11.7 G/DL (ref 12–15.9)
HGB UR QL STRIP.AUTO: NEGATIVE
KETONES UR QL STRIP: NEGATIVE
LEUKOCYTE ESTERASE UR QL STRIP.AUTO: NEGATIVE
LIPASE SERPL-CCNC: 26 U/L (ref 13–60)
LYMPHOCYTES # BLD MANUAL: 0.45 10*3/MM3 (ref 0.7–3.1)
LYMPHOCYTES NFR BLD MANUAL: 2 % (ref 5–12)
MCH RBC QN AUTO: 28.9 PG (ref 26.6–33)
MCHC RBC AUTO-ENTMCNC: 32.7 G/DL (ref 31.5–35.7)
MCV RBC AUTO: 88.4 FL (ref 79–97)
MONOCYTES # BLD: 0.13 10*3/MM3 (ref 0.1–0.9)
NEUTROPHILS # BLD AUTO: 5.73 10*3/MM3 (ref 1.7–7)
NEUTROPHILS NFR BLD MANUAL: 90.8 % (ref 42.7–76)
NITRITE UR QL STRIP: NEGATIVE
NT-PROBNP SERPL-MCNC: 2472 PG/ML (ref 0–1800)
PH UR STRIP.AUTO: 5.5 [PH] (ref 5–8)
PLAT MORPH BLD: NORMAL
PLATELET # BLD AUTO: 60 10*3/MM3 (ref 140–450)
PMV BLD AUTO: 11.6 FL (ref 6–12)
POTASSIUM SERPL-SCNC: 4.4 MMOL/L (ref 3.5–5.2)
PROT SERPL-MCNC: 6.1 G/DL (ref 6–8.5)
PROT UR QL STRIP: ABNORMAL
RBC # BLD AUTO: 4.05 10*6/MM3 (ref 3.77–5.28)
RBC MORPH BLD: NORMAL
SARS-COV-2 ORF1AB RESP QL NAA+PROBE: DETECTED
SODIUM SERPL-SCNC: 136 MMOL/L (ref 136–145)
SP GR UR STRIP: 1.01 (ref 1–1.03)
UROBILINOGEN UR QL STRIP: ABNORMAL
VARIANT LYMPHS NFR BLD MANUAL: 7.1 % (ref 19.6–45.3)
WBC MORPH BLD: NORMAL
WBC NRBC COR # BLD: 6.31 10*3/MM3 (ref 3.4–10.8)

## 2022-08-24 PROCEDURE — 82962 GLUCOSE BLOOD TEST: CPT

## 2022-08-24 PROCEDURE — G0378 HOSPITAL OBSERVATION PER HR: HCPCS

## 2022-08-24 PROCEDURE — 72148 MRI LUMBAR SPINE W/O DYE: CPT

## 2022-08-24 PROCEDURE — 25010000002 MORPHINE PER 10 MG: Performed by: EMERGENCY MEDICINE

## 2022-08-24 PROCEDURE — 72131 CT LUMBAR SPINE W/O DYE: CPT | Performed by: EMERGENCY MEDICINE

## 2022-08-24 PROCEDURE — U0004 COV-19 TEST NON-CDC HGH THRU: HCPCS | Performed by: EMERGENCY MEDICINE

## 2022-08-24 PROCEDURE — 72100 X-RAY EXAM L-S SPINE 2/3 VWS: CPT

## 2022-08-24 PROCEDURE — 25010000002 MORPHINE PER 10 MG: Performed by: HOSPITALIST

## 2022-08-24 PROCEDURE — 83690 ASSAY OF LIPASE: CPT | Performed by: EMERGENCY MEDICINE

## 2022-08-24 PROCEDURE — 83880 ASSAY OF NATRIURETIC PEPTIDE: CPT | Performed by: HOSPITALIST

## 2022-08-24 PROCEDURE — 36415 COLL VENOUS BLD VENIPUNCTURE: CPT

## 2022-08-24 PROCEDURE — U0005 INFEC AGEN DETEC AMPLI PROBE: HCPCS | Performed by: EMERGENCY MEDICINE

## 2022-08-24 PROCEDURE — 25010000002 ONDANSETRON PER 1 MG: Performed by: EMERGENCY MEDICINE

## 2022-08-24 PROCEDURE — 81003 URINALYSIS AUTO W/O SCOPE: CPT | Performed by: EMERGENCY MEDICINE

## 2022-08-24 PROCEDURE — 99284 EMERGENCY DEPT VISIT MOD MDM: CPT

## 2022-08-24 PROCEDURE — 74176 CT ABD & PELVIS W/O CONTRAST: CPT | Performed by: EMERGENCY MEDICINE

## 2022-08-24 PROCEDURE — 85007 BL SMEAR W/DIFF WBC COUNT: CPT | Performed by: EMERGENCY MEDICINE

## 2022-08-24 PROCEDURE — 63710000001 INSULIN LISPRO (HUMAN) PER 5 UNITS: Performed by: HOSPITALIST

## 2022-08-24 PROCEDURE — 80053 COMPREHEN METABOLIC PANEL: CPT | Performed by: EMERGENCY MEDICINE

## 2022-08-24 PROCEDURE — 85025 COMPLETE CBC W/AUTO DIFF WBC: CPT | Performed by: EMERGENCY MEDICINE

## 2022-08-24 RX ORDER — MORPHINE SULFATE 2 MG/ML
4 INJECTION, SOLUTION INTRAMUSCULAR; INTRAVENOUS ONCE
Status: DISCONTINUED | OUTPATIENT
Start: 2022-08-24 | End: 2022-08-24

## 2022-08-24 RX ORDER — MORPHINE SULFATE 2 MG/ML
2 INJECTION, SOLUTION INTRAMUSCULAR; INTRAVENOUS EVERY 4 HOURS PRN
Status: DISCONTINUED | OUTPATIENT
Start: 2022-08-24 | End: 2022-08-26

## 2022-08-24 RX ORDER — INSULIN LISPRO 100 [IU]/ML
0-7 INJECTION, SOLUTION INTRAVENOUS; SUBCUTANEOUS
Status: DISCONTINUED | OUTPATIENT
Start: 2022-08-24 | End: 2022-08-29 | Stop reason: HOSPADM

## 2022-08-24 RX ORDER — SODIUM CHLORIDE 0.9 % (FLUSH) 0.9 %
10 SYRINGE (ML) INJECTION AS NEEDED
Status: DISCONTINUED | OUTPATIENT
Start: 2022-08-24 | End: 2022-08-29 | Stop reason: HOSPADM

## 2022-08-24 RX ORDER — FAMOTIDINE 20 MG/1
10 TABLET, FILM COATED ORAL 2 TIMES DAILY
Status: DISCONTINUED | OUTPATIENT
Start: 2022-08-24 | End: 2022-08-25

## 2022-08-24 RX ORDER — AMLODIPINE BESYLATE 5 MG/1
10 TABLET ORAL DAILY
Status: DISCONTINUED | OUTPATIENT
Start: 2022-08-24 | End: 2022-08-29 | Stop reason: HOSPADM

## 2022-08-24 RX ORDER — HYDRALAZINE HYDROCHLORIDE 50 MG/1
50 TABLET, FILM COATED ORAL EVERY 8 HOURS SCHEDULED
Status: DISCONTINUED | OUTPATIENT
Start: 2022-08-24 | End: 2022-08-25

## 2022-08-24 RX ORDER — FUROSEMIDE 20 MG/1
20 TABLET ORAL
Status: DISCONTINUED | OUTPATIENT
Start: 2022-08-25 | End: 2022-08-25

## 2022-08-24 RX ORDER — MONTELUKAST SODIUM 10 MG/1
10 TABLET ORAL NIGHTLY
Status: DISCONTINUED | OUTPATIENT
Start: 2022-08-24 | End: 2022-08-29 | Stop reason: HOSPADM

## 2022-08-24 RX ORDER — ONDANSETRON 2 MG/ML
4 INJECTION INTRAMUSCULAR; INTRAVENOUS ONCE
Status: COMPLETED | OUTPATIENT
Start: 2022-08-24 | End: 2022-08-24

## 2022-08-24 RX ORDER — ONDANSETRON 2 MG/ML
4 INJECTION INTRAMUSCULAR; INTRAVENOUS EVERY 6 HOURS PRN
Status: DISCONTINUED | OUTPATIENT
Start: 2022-08-24 | End: 2022-08-29 | Stop reason: HOSPADM

## 2022-08-24 RX ORDER — INSULIN LISPRO 100 [IU]/ML
5 INJECTION, SOLUTION INTRAVENOUS; SUBCUTANEOUS
Status: DISCONTINUED | OUTPATIENT
Start: 2022-08-24 | End: 2022-08-25

## 2022-08-24 RX ORDER — LEVOTHYROXINE SODIUM 0.03 MG/1
25 TABLET ORAL DAILY
Status: DISCONTINUED | OUTPATIENT
Start: 2022-08-25 | End: 2022-08-29 | Stop reason: HOSPADM

## 2022-08-24 RX ORDER — LORAZEPAM 0.5 MG/1
0.5 TABLET ORAL EVERY 6 HOURS PRN
Status: DISCONTINUED | OUTPATIENT
Start: 2022-08-24 | End: 2022-08-29 | Stop reason: HOSPADM

## 2022-08-24 RX ORDER — CARVEDILOL 12.5 MG/1
12.5 TABLET ORAL 2 TIMES DAILY
Status: DISCONTINUED | OUTPATIENT
Start: 2022-08-24 | End: 2022-08-29 | Stop reason: HOSPADM

## 2022-08-24 RX ORDER — GABAPENTIN 400 MG/1
400 CAPSULE ORAL 2 TIMES DAILY
Status: DISCONTINUED | OUTPATIENT
Start: 2022-08-24 | End: 2022-08-29 | Stop reason: HOSPADM

## 2022-08-24 RX ORDER — FUROSEMIDE 20 MG/1
20 TABLET ORAL DAILY
Status: DISCONTINUED | OUTPATIENT
Start: 2022-08-24 | End: 2022-08-24

## 2022-08-24 RX ORDER — TAMSULOSIN HYDROCHLORIDE 0.4 MG/1
0.4 CAPSULE ORAL DAILY
Status: DISCONTINUED | OUTPATIENT
Start: 2022-08-25 | End: 2022-08-29 | Stop reason: HOSPADM

## 2022-08-24 RX ORDER — INSULIN LISPRO 100 [IU]/ML
0-7 INJECTION, SOLUTION INTRAVENOUS; SUBCUTANEOUS
Status: DISCONTINUED | OUTPATIENT
Start: 2022-08-24 | End: 2022-08-24

## 2022-08-24 RX ORDER — MORPHINE SULFATE 2 MG/ML
4 INJECTION, SOLUTION INTRAMUSCULAR; INTRAVENOUS ONCE
Status: COMPLETED | OUTPATIENT
Start: 2022-08-24 | End: 2022-08-24

## 2022-08-24 RX ORDER — PREDNISONE 10 MG/1
15 TABLET ORAL
Status: DISCONTINUED | OUTPATIENT
Start: 2022-08-25 | End: 2022-08-26

## 2022-08-24 RX ORDER — ROSUVASTATIN CALCIUM 5 MG/1
5 TABLET, COATED ORAL DAILY
Status: DISCONTINUED | OUTPATIENT
Start: 2022-08-25 | End: 2022-08-29 | Stop reason: HOSPADM

## 2022-08-24 RX ADMIN — GABAPENTIN 400 MG: 400 CAPSULE ORAL at 22:13

## 2022-08-24 RX ADMIN — MORPHINE SULFATE 4 MG: 2 INJECTION, SOLUTION INTRAMUSCULAR; INTRAVENOUS at 14:55

## 2022-08-24 RX ADMIN — LORAZEPAM 0.5 MG: 0.5 TABLET ORAL at 22:13

## 2022-08-24 RX ADMIN — MORPHINE SULFATE 2 MG: 2 INJECTION, SOLUTION INTRAMUSCULAR; INTRAVENOUS at 18:00

## 2022-08-24 RX ADMIN — MONTELUKAST SODIUM 10 MG: 10 TABLET, FILM COATED ORAL at 22:13

## 2022-08-24 RX ADMIN — FAMOTIDINE 10 MG: 20 TABLET ORAL at 22:14

## 2022-08-24 RX ADMIN — INSULIN GLARGINE-YFGN 15 UNITS: 100 INJECTION, SOLUTION SUBCUTANEOUS at 22:12

## 2022-08-24 RX ADMIN — AMLODIPINE BESYLATE 10 MG: 5 TABLET ORAL at 22:13

## 2022-08-24 RX ADMIN — CARVEDILOL 12.5 MG: 12.5 TABLET, FILM COATED ORAL at 22:13

## 2022-08-24 RX ADMIN — HYDRALAZINE HYDROCHLORIDE 50 MG: 50 TABLET, FILM COATED ORAL at 22:13

## 2022-08-24 RX ADMIN — INSULIN LISPRO 3 UNITS: 100 INJECTION, SOLUTION INTRAVENOUS; SUBCUTANEOUS at 22:12

## 2022-08-24 RX ADMIN — APIXABAN 5 MG: 5 TABLET, FILM COATED ORAL at 22:13

## 2022-08-24 RX ADMIN — MORPHINE SULFATE 2 MG: 2 INJECTION, SOLUTION INTRAMUSCULAR; INTRAVENOUS at 22:27

## 2022-08-24 RX ADMIN — ONDANSETRON 4 MG: 2 INJECTION INTRAMUSCULAR; INTRAVENOUS at 14:53

## 2022-08-25 ENCOUNTER — TELEPHONE (OUTPATIENT)
Dept: ONCOLOGY | Facility: CLINIC | Age: 87
End: 2022-08-25

## 2022-08-25 ENCOUNTER — READMISSION MANAGEMENT (OUTPATIENT)
Dept: CALL CENTER | Facility: HOSPITAL | Age: 87
End: 2022-08-25

## 2022-08-25 ENCOUNTER — TELEPHONE (OUTPATIENT)
Dept: NEUROSURGERY | Facility: CLINIC | Age: 87
End: 2022-08-25

## 2022-08-25 DIAGNOSIS — S32.051A: Primary | ICD-10-CM

## 2022-08-25 PROBLEM — S32.059A: Status: ACTIVE | Noted: 2022-08-25

## 2022-08-25 LAB
ALBUMIN SERPL-MCNC: 2.7 G/DL (ref 3.5–5.2)
ALBUMIN/GLOB SERPL: 1 G/DL
ALP SERPL-CCNC: 56 U/L (ref 39–117)
ALT SERPL W P-5'-P-CCNC: 13 U/L (ref 1–33)
ANION GAP SERPL CALCULATED.3IONS-SCNC: 7.9 MMOL/L (ref 5–15)
AST SERPL-CCNC: 14 U/L (ref 1–32)
BASOPHILS # BLD AUTO: 0.02 10*3/MM3 (ref 0–0.2)
BASOPHILS NFR BLD AUTO: 0.4 % (ref 0–1.5)
BILIRUB SERPL-MCNC: 0.4 MG/DL (ref 0–1.2)
BUN SERPL-MCNC: 25 MG/DL (ref 8–23)
BUN/CREAT SERPL: 16.6 (ref 7–25)
CALCIUM SPEC-SCNC: 8.5 MG/DL (ref 8.2–9.6)
CHLORIDE SERPL-SCNC: 105 MMOL/L (ref 98–107)
CHOLEST SERPL-MCNC: 121 MG/DL (ref 0–200)
CO2 SERPL-SCNC: 23.1 MMOL/L (ref 22–29)
CREAT SERPL-MCNC: 1.51 MG/DL (ref 0.57–1)
DEPRECATED RDW RBC AUTO: 44.3 FL (ref 37–54)
EGFRCR SERPLBLD CKD-EPI 2021: 32.5 ML/MIN/1.73
EOSINOPHIL # BLD AUTO: 0.09 10*3/MM3 (ref 0–0.4)
EOSINOPHIL NFR BLD AUTO: 2 % (ref 0.3–6.2)
ERYTHROCYTE [DISTWIDTH] IN BLOOD BY AUTOMATED COUNT: 14 % (ref 12.3–15.4)
GLOBULIN UR ELPH-MCNC: 2.6 GM/DL
GLUCOSE BLDC GLUCOMTR-MCNC: 144 MG/DL (ref 70–130)
GLUCOSE BLDC GLUCOMTR-MCNC: 200 MG/DL (ref 70–130)
GLUCOSE BLDC GLUCOMTR-MCNC: 215 MG/DL (ref 70–130)
GLUCOSE BLDC GLUCOMTR-MCNC: 240 MG/DL (ref 70–130)
GLUCOSE SERPL-MCNC: 298 MG/DL (ref 65–99)
HBA1C MFR BLD: 7.2 % (ref 4.8–5.6)
HCT VFR BLD AUTO: 32.7 % (ref 34–46.6)
HDLC SERPL-MCNC: 57 MG/DL (ref 40–60)
HGB BLD-MCNC: 10.5 G/DL (ref 12–15.9)
IMM GRANULOCYTES # BLD AUTO: 0.02 10*3/MM3 (ref 0–0.05)
IMM GRANULOCYTES NFR BLD AUTO: 0.4 % (ref 0–0.5)
LDLC SERPL CALC-MCNC: 39 MG/DL (ref 0–100)
LDLC/HDLC SERPL: 0.59 {RATIO}
LYMPHOCYTES # BLD AUTO: 0.82 10*3/MM3 (ref 0.7–3.1)
LYMPHOCYTES NFR BLD AUTO: 18.2 % (ref 19.6–45.3)
MCH RBC QN AUTO: 27.9 PG (ref 26.6–33)
MCHC RBC AUTO-ENTMCNC: 32.1 G/DL (ref 31.5–35.7)
MCV RBC AUTO: 87 FL (ref 79–97)
MONOCYTES # BLD AUTO: 0.44 10*3/MM3 (ref 0.1–0.9)
MONOCYTES NFR BLD AUTO: 9.8 % (ref 5–12)
NEUTROPHILS NFR BLD AUTO: 3.12 10*3/MM3 (ref 1.7–7)
NEUTROPHILS NFR BLD AUTO: 69.2 % (ref 42.7–76)
NRBC BLD AUTO-RTO: 0 /100 WBC (ref 0–0.2)
PLATELET # BLD AUTO: 69 10*3/MM3 (ref 140–450)
PMV BLD AUTO: 12.1 FL (ref 6–12)
POTASSIUM SERPL-SCNC: 4.4 MMOL/L (ref 3.5–5.2)
PROT SERPL-MCNC: 5.3 G/DL (ref 6–8.5)
RBC # BLD AUTO: 3.76 10*6/MM3 (ref 3.77–5.28)
SODIUM SERPL-SCNC: 136 MMOL/L (ref 136–145)
TRIGL SERPL-MCNC: 151 MG/DL (ref 0–150)
TSH SERPL DL<=0.05 MIU/L-ACNC: 1.2 UIU/ML (ref 0.27–4.2)
VLDLC SERPL-MCNC: 25 MG/DL (ref 5–40)
WBC NRBC COR # BLD: 4.51 10*3/MM3 (ref 3.4–10.8)

## 2022-08-25 PROCEDURE — 82962 GLUCOSE BLOOD TEST: CPT

## 2022-08-25 PROCEDURE — 83036 HEMOGLOBIN GLYCOSYLATED A1C: CPT | Performed by: HOSPITALIST

## 2022-08-25 PROCEDURE — 84443 ASSAY THYROID STIM HORMONE: CPT | Performed by: HOSPITALIST

## 2022-08-25 PROCEDURE — 80053 COMPREHEN METABOLIC PANEL: CPT | Performed by: HOSPITALIST

## 2022-08-25 PROCEDURE — 85025 COMPLETE CBC W/AUTO DIFF WBC: CPT | Performed by: HOSPITALIST

## 2022-08-25 PROCEDURE — 25010000002 MORPHINE PER 10 MG: Performed by: HOSPITALIST

## 2022-08-25 PROCEDURE — 63710000001 PREDNISONE PER 5 MG: Performed by: HOSPITALIST

## 2022-08-25 PROCEDURE — 36415 COLL VENOUS BLD VENIPUNCTURE: CPT | Performed by: HOSPITALIST

## 2022-08-25 PROCEDURE — 99221 1ST HOSP IP/OBS SF/LOW 40: CPT | Performed by: NURSE PRACTITIONER

## 2022-08-25 PROCEDURE — 80061 LIPID PANEL: CPT | Performed by: HOSPITALIST

## 2022-08-25 PROCEDURE — 63710000001 INSULIN LISPRO (HUMAN) PER 5 UNITS: Performed by: HOSPITALIST

## 2022-08-25 RX ORDER — ASCORBIC ACID 500 MG
500 TABLET ORAL DAILY
Status: DISCONTINUED | OUTPATIENT
Start: 2022-08-25 | End: 2022-08-29 | Stop reason: HOSPADM

## 2022-08-25 RX ORDER — POLYETHYLENE GLYCOL 3350 17 G/17G
17 POWDER, FOR SOLUTION ORAL 2 TIMES DAILY
Status: DISCONTINUED | OUTPATIENT
Start: 2022-08-25 | End: 2022-08-29 | Stop reason: HOSPADM

## 2022-08-25 RX ORDER — ALBUTEROL SULFATE 2.5 MG/3ML
2.5 SOLUTION RESPIRATORY (INHALATION) EVERY 6 HOURS PRN
Status: DISCONTINUED | OUTPATIENT
Start: 2022-08-25 | End: 2022-08-29 | Stop reason: HOSPADM

## 2022-08-25 RX ORDER — CETIRIZINE HYDROCHLORIDE 10 MG/1
5 TABLET ORAL DAILY
Status: DISCONTINUED | OUTPATIENT
Start: 2022-08-25 | End: 2022-08-29 | Stop reason: HOSPADM

## 2022-08-25 RX ORDER — FUROSEMIDE 20 MG/1
20 TABLET ORAL DAILY
Status: DISCONTINUED | OUTPATIENT
Start: 2022-08-26 | End: 2022-08-29 | Stop reason: HOSPADM

## 2022-08-25 RX ORDER — GUAIFENESIN 600 MG/1
600 TABLET, EXTENDED RELEASE ORAL EVERY 12 HOURS SCHEDULED
Status: DISCONTINUED | OUTPATIENT
Start: 2022-08-25 | End: 2022-08-29 | Stop reason: HOSPADM

## 2022-08-25 RX ORDER — ZINC SULFATE 50(220)MG
220 CAPSULE ORAL DAILY
Status: DISCONTINUED | OUTPATIENT
Start: 2022-08-25 | End: 2022-08-29 | Stop reason: HOSPADM

## 2022-08-25 RX ORDER — BUDESONIDE AND FORMOTEROL FUMARATE DIHYDRATE 160; 4.5 UG/1; UG/1
2 AEROSOL RESPIRATORY (INHALATION)
Status: DISCONTINUED | OUTPATIENT
Start: 2022-08-25 | End: 2022-08-29 | Stop reason: HOSPADM

## 2022-08-25 RX ORDER — INSULIN LISPRO 100 [IU]/ML
7 INJECTION, SOLUTION INTRAVENOUS; SUBCUTANEOUS
Status: DISCONTINUED | OUTPATIENT
Start: 2022-08-25 | End: 2022-08-27

## 2022-08-25 RX ORDER — FAMOTIDINE 20 MG/1
20 TABLET, FILM COATED ORAL 2 TIMES DAILY
Status: DISCONTINUED | OUTPATIENT
Start: 2022-08-25 | End: 2022-08-29 | Stop reason: HOSPADM

## 2022-08-25 RX ORDER — MELATONIN
1000 DAILY
Status: DISCONTINUED | OUTPATIENT
Start: 2022-08-25 | End: 2022-08-29 | Stop reason: HOSPADM

## 2022-08-25 RX ORDER — METHOCARBAMOL 500 MG/1
750 TABLET, FILM COATED ORAL EVERY 6 HOURS PRN
Status: DISCONTINUED | OUTPATIENT
Start: 2022-08-25 | End: 2022-08-29 | Stop reason: HOSPADM

## 2022-08-25 RX ADMIN — OXYCODONE HYDROCHLORIDE AND ACETAMINOPHEN 500 MG: 500 TABLET ORAL at 17:28

## 2022-08-25 RX ADMIN — CETIRIZINE HYDROCHLORIDE 5 MG: 10 TABLET ORAL at 17:29

## 2022-08-25 RX ADMIN — HYDRALAZINE HYDROCHLORIDE 75 MG: 50 TABLET, FILM COATED ORAL at 22:08

## 2022-08-25 RX ADMIN — Medication 220 MG: at 17:28

## 2022-08-25 RX ADMIN — INSULIN LISPRO 5 UNITS: 100 INJECTION, SOLUTION INTRAVENOUS; SUBCUTANEOUS at 11:51

## 2022-08-25 RX ADMIN — INSULIN LISPRO 3 UNITS: 100 INJECTION, SOLUTION INTRAVENOUS; SUBCUTANEOUS at 09:26

## 2022-08-25 RX ADMIN — MONTELUKAST SODIUM 10 MG: 10 TABLET, FILM COATED ORAL at 20:31

## 2022-08-25 RX ADMIN — GUAIFENESIN 600 MG: 600 TABLET, EXTENDED RELEASE ORAL at 20:31

## 2022-08-25 RX ADMIN — INSULIN LISPRO 5 UNITS: 100 INJECTION, SOLUTION INTRAVENOUS; SUBCUTANEOUS at 09:25

## 2022-08-25 RX ADMIN — CARVEDILOL 12.5 MG: 12.5 TABLET, FILM COATED ORAL at 20:31

## 2022-08-25 RX ADMIN — PREDNISONE 15 MG: 10 TABLET ORAL at 09:23

## 2022-08-25 RX ADMIN — ROSUVASTATIN CALCIUM 5 MG: 5 TABLET, FILM COATED ORAL at 09:24

## 2022-08-25 RX ADMIN — Medication 1000 UNITS: at 17:26

## 2022-08-25 RX ADMIN — LORAZEPAM 0.5 MG: 0.5 TABLET ORAL at 20:31

## 2022-08-25 RX ADMIN — INSULIN LISPRO 3 UNITS: 100 INJECTION, SOLUTION INTRAVENOUS; SUBCUTANEOUS at 20:36

## 2022-08-25 RX ADMIN — MORPHINE SULFATE 2 MG: 2 INJECTION, SOLUTION INTRAMUSCULAR; INTRAVENOUS at 09:23

## 2022-08-25 RX ADMIN — FAMOTIDINE 20 MG: 20 TABLET ORAL at 20:31

## 2022-08-25 RX ADMIN — INSULIN LISPRO 3 UNITS: 100 INJECTION, SOLUTION INTRAVENOUS; SUBCUTANEOUS at 11:52

## 2022-08-25 RX ADMIN — APIXABAN 5 MG: 5 TABLET, FILM COATED ORAL at 20:31

## 2022-08-25 RX ADMIN — POLYETHYLENE GLYCOL 3350 17 G: 17 POWDER, FOR SOLUTION ORAL at 20:31

## 2022-08-25 RX ADMIN — CARVEDILOL 12.5 MG: 12.5 TABLET, FILM COATED ORAL at 09:24

## 2022-08-25 RX ADMIN — MORPHINE SULFATE 2 MG: 2 INJECTION, SOLUTION INTRAMUSCULAR; INTRAVENOUS at 18:54

## 2022-08-25 RX ADMIN — APIXABAN 5 MG: 5 TABLET, FILM COATED ORAL at 09:23

## 2022-08-25 RX ADMIN — FAMOTIDINE 10 MG: 20 TABLET ORAL at 09:24

## 2022-08-25 RX ADMIN — LEVOTHYROXINE SODIUM 25 MCG: 0.03 TABLET ORAL at 09:24

## 2022-08-25 RX ADMIN — TAMSULOSIN HYDROCHLORIDE 0.4 MG: 0.4 CAPSULE ORAL at 09:24

## 2022-08-25 RX ADMIN — METHOCARBAMOL TABLETS 750 MG: 500 TABLET, COATED ORAL at 17:29

## 2022-08-25 RX ADMIN — AMLODIPINE BESYLATE 10 MG: 5 TABLET ORAL at 09:23

## 2022-08-25 RX ADMIN — GABAPENTIN 400 MG: 400 CAPSULE ORAL at 09:23

## 2022-08-25 RX ADMIN — INSULIN GLARGINE-YFGN 20 UNITS: 100 INJECTION, SOLUTION SUBCUTANEOUS at 20:37

## 2022-08-25 RX ADMIN — FUROSEMIDE 20 MG: 20 TABLET ORAL at 09:25

## 2022-08-25 RX ADMIN — GABAPENTIN 400 MG: 400 CAPSULE ORAL at 20:31

## 2022-08-25 RX ADMIN — INSULIN LISPRO 7 UNITS: 100 INJECTION, SOLUTION INTRAVENOUS; SUBCUTANEOUS at 17:30

## 2022-08-25 RX ADMIN — HYDRALAZINE HYDROCHLORIDE 50 MG: 50 TABLET, FILM COATED ORAL at 13:36

## 2022-08-25 RX ADMIN — HYDRALAZINE HYDROCHLORIDE 50 MG: 50 TABLET, FILM COATED ORAL at 05:47

## 2022-08-25 NOTE — TELEPHONE ENCOUNTER
Returned call to patient's daughter. Explained that patient's CBC will likely be drawn periodically, if not daily, while in the hospital. The CBC will include a platelet count. However, we are not able to order an IPF since our office has not been consulted yet during this admission. Suggested to patient's daughter that she request a hematology consult from patient's attending MD or for patient's primary nurse to request this from MD. Advised her that we would be better able to follow patient's hospital course if our office is consulted. Patient's daughter v/u. Advised patient's daughter that once patient is close to discharge, her follow up appointments could be better adjusted. Encouraged patient's daughter to call with any additional concerns. Patient's daughter v/u.

## 2022-08-25 NOTE — TELEPHONE ENCOUNTER
----- Message from RIGO Gant sent at 8/25/2022  3:53 PM EDT -----  Regarding: f/u  Patient with L5 compression fracture.  Needs follow-up with Dr. Erickson in 6 weeks with lumbar AP/lateral upright x-rays.

## 2022-08-25 NOTE — OUTREACH NOTE
COVID-19 Week 3 Survey    Flowsheet Row Responses   [facility] patient discharged from? [place]   Does the patient have one of the following disease processes/diagnoses(primary or secondary)? COVID-19   COVID-19 underlying condition? CHF   COVID-19 Week 3: Call 1 attempt successful? No   Revoke Readmitted   Discharge diagnosis COVID-19 virus infection Acute DVT Malignant neoplasm of uterus           MARK FIGUEROA - Registered Nurse

## 2022-08-26 ENCOUNTER — SPECIALTY PHARMACY (OUTPATIENT)
Dept: PHARMACY | Facility: HOSPITAL | Age: 87
End: 2022-08-26

## 2022-08-26 DIAGNOSIS — D69.6 THROMBOCYTOPENIA: Primary | ICD-10-CM

## 2022-08-26 PROBLEM — E11.42 DIABETIC POLYNEUROPATHY ASSOCIATED WITH TYPE 2 DIABETES MELLITUS: Status: ACTIVE | Noted: 2022-08-26

## 2022-08-26 LAB
ALBUMIN SERPL-MCNC: 3.2 G/DL (ref 3.5–5.2)
ALBUMIN/GLOB SERPL: 1.2 G/DL
ALP SERPL-CCNC: 64 U/L (ref 39–117)
ALT SERPL W P-5'-P-CCNC: 13 U/L (ref 1–33)
ANION GAP SERPL CALCULATED.3IONS-SCNC: 8.6 MMOL/L (ref 5–15)
AST SERPL-CCNC: 12 U/L (ref 1–32)
BASOPHILS # BLD AUTO: 0.01 10*3/MM3 (ref 0–0.2)
BASOPHILS NFR BLD AUTO: 0.2 % (ref 0–1.5)
BILIRUB SERPL-MCNC: 0.5 MG/DL (ref 0–1.2)
BUN SERPL-MCNC: 30 MG/DL (ref 8–23)
BUN/CREAT SERPL: 19.6 (ref 7–25)
CALCIUM SPEC-SCNC: 8.8 MG/DL (ref 8.2–9.6)
CHLORIDE SERPL-SCNC: 102 MMOL/L (ref 98–107)
CO2 SERPL-SCNC: 24.4 MMOL/L (ref 22–29)
CREAT SERPL-MCNC: 1.53 MG/DL (ref 0.57–1)
CRP SERPL-MCNC: 4.08 MG/DL (ref 0–0.5)
DEPRECATED RDW RBC AUTO: 46 FL (ref 37–54)
EGFRCR SERPLBLD CKD-EPI 2021: 32 ML/MIN/1.73
EOSINOPHIL # BLD AUTO: 0.11 10*3/MM3 (ref 0–0.4)
EOSINOPHIL NFR BLD AUTO: 2.1 % (ref 0.3–6.2)
ERYTHROCYTE [DISTWIDTH] IN BLOOD BY AUTOMATED COUNT: 14.3 % (ref 12.3–15.4)
FERRITIN SERPL-MCNC: 241 NG/ML (ref 13–150)
GLOBULIN UR ELPH-MCNC: 2.7 GM/DL
GLUCOSE BLDC GLUCOMTR-MCNC: 197 MG/DL (ref 70–130)
GLUCOSE BLDC GLUCOMTR-MCNC: 232 MG/DL (ref 70–130)
GLUCOSE BLDC GLUCOMTR-MCNC: 308 MG/DL (ref 70–130)
GLUCOSE SERPL-MCNC: 179 MG/DL (ref 65–99)
HCT VFR BLD AUTO: 33.8 % (ref 34–46.6)
HGB BLD-MCNC: 10.7 G/DL (ref 12–15.9)
IRON 24H UR-MRATE: 36 MCG/DL (ref 37–145)
IRON SATN MFR SERPL: 13 % (ref 20–50)
LYMPHOCYTES # BLD AUTO: 1 10*3/MM3 (ref 0.7–3.1)
LYMPHOCYTES NFR BLD AUTO: 19 % (ref 19.6–45.3)
MCH RBC QN AUTO: 28.2 PG (ref 26.6–33)
MCHC RBC AUTO-ENTMCNC: 31.7 G/DL (ref 31.5–35.7)
MCV RBC AUTO: 89.2 FL (ref 79–97)
MONOCYTES # BLD AUTO: 0.45 10*3/MM3 (ref 0.1–0.9)
MONOCYTES NFR BLD AUTO: 8.6 % (ref 5–12)
NEUTROPHILS NFR BLD AUTO: 3.68 10*3/MM3 (ref 1.7–7)
NEUTROPHILS NFR BLD AUTO: 69.9 % (ref 42.7–76)
PLATELET # BLD AUTO: 62 10*3/MM3 (ref 140–450)
PMV BLD AUTO: 11.4 FL (ref 6–12)
POTASSIUM SERPL-SCNC: 4.3 MMOL/L (ref 3.5–5.2)
PROT SERPL-MCNC: 5.9 G/DL (ref 6–8.5)
RBC # BLD AUTO: 3.79 10*6/MM3 (ref 3.77–5.28)
SODIUM SERPL-SCNC: 135 MMOL/L (ref 136–145)
TIBC SERPL-MCNC: 276 MCG/DL (ref 298–536)
TRANSFERRIN SERPL-MCNC: 185 MG/DL (ref 200–360)
WBC NRBC COR # BLD: 5.26 10*3/MM3 (ref 3.4–10.8)

## 2022-08-26 PROCEDURE — 99222 1ST HOSP IP/OBS MODERATE 55: CPT | Performed by: INTERNAL MEDICINE

## 2022-08-26 PROCEDURE — 97530 THERAPEUTIC ACTIVITIES: CPT

## 2022-08-26 PROCEDURE — 82728 ASSAY OF FERRITIN: CPT | Performed by: INTERNAL MEDICINE

## 2022-08-26 PROCEDURE — 84466 ASSAY OF TRANSFERRIN: CPT | Performed by: INTERNAL MEDICINE

## 2022-08-26 PROCEDURE — 94799 UNLISTED PULMONARY SVC/PX: CPT

## 2022-08-26 PROCEDURE — 97162 PT EVAL MOD COMPLEX 30 MIN: CPT

## 2022-08-26 PROCEDURE — 94640 AIRWAY INHALATION TREATMENT: CPT

## 2022-08-26 PROCEDURE — 82962 GLUCOSE BLOOD TEST: CPT

## 2022-08-26 PROCEDURE — 63710000001 PREDNISONE PER 5 MG: Performed by: HOSPITALIST

## 2022-08-26 PROCEDURE — 25010000002 ONDANSETRON PER 1 MG: Performed by: HOSPITALIST

## 2022-08-26 PROCEDURE — 85025 COMPLETE CBC W/AUTO DIFF WBC: CPT | Performed by: HOSPITALIST

## 2022-08-26 PROCEDURE — 83540 ASSAY OF IRON: CPT | Performed by: INTERNAL MEDICINE

## 2022-08-26 PROCEDURE — 80053 COMPREHEN METABOLIC PANEL: CPT | Performed by: HOSPITALIST

## 2022-08-26 PROCEDURE — 25010000002 MORPHINE PER 10 MG: Performed by: HOSPITALIST

## 2022-08-26 PROCEDURE — 63710000001 INSULIN LISPRO (HUMAN) PER 5 UNITS: Performed by: HOSPITALIST

## 2022-08-26 PROCEDURE — 86140 C-REACTIVE PROTEIN: CPT | Performed by: HOSPITALIST

## 2022-08-26 RX ORDER — PREDNISONE 10 MG/1
10 TABLET ORAL
Status: DISCONTINUED | OUTPATIENT
Start: 2022-08-27 | End: 2022-08-27

## 2022-08-26 RX ORDER — HYDROCODONE BITARTRATE AND ACETAMINOPHEN 5; 325 MG/1; MG/1
1 TABLET ORAL EVERY 4 HOURS PRN
Status: DISCONTINUED | OUTPATIENT
Start: 2022-08-26 | End: 2022-08-28

## 2022-08-26 RX ORDER — UREA 10 %
1 LOTION (ML) TOPICAL NIGHTLY
Status: DISCONTINUED | OUTPATIENT
Start: 2022-08-26 | End: 2022-08-29 | Stop reason: HOSPADM

## 2022-08-26 RX ORDER — ACETAMINOPHEN 325 MG/1
650 TABLET ORAL NIGHTLY
Status: DISCONTINUED | OUTPATIENT
Start: 2022-08-26 | End: 2022-08-29 | Stop reason: HOSPADM

## 2022-08-26 RX ORDER — PREDNISONE 10 MG/1
5 TABLET ORAL
Status: DISCONTINUED | OUTPATIENT
Start: 2022-08-27 | End: 2022-08-26

## 2022-08-26 RX ADMIN — HYDROCODONE BITARTRATE AND ACETAMINOPHEN 1 TABLET: 5; 325 TABLET ORAL at 18:00

## 2022-08-26 RX ADMIN — TAMSULOSIN HYDROCHLORIDE 0.4 MG: 0.4 CAPSULE ORAL at 10:35

## 2022-08-26 RX ADMIN — GABAPENTIN 400 MG: 400 CAPSULE ORAL at 20:52

## 2022-08-26 RX ADMIN — AMLODIPINE BESYLATE 10 MG: 5 TABLET ORAL at 10:35

## 2022-08-26 RX ADMIN — FUROSEMIDE 20 MG: 20 TABLET ORAL at 10:25

## 2022-08-26 RX ADMIN — MORPHINE SULFATE 2 MG: 2 INJECTION, SOLUTION INTRAMUSCULAR; INTRAVENOUS at 11:41

## 2022-08-26 RX ADMIN — HYDRALAZINE HYDROCHLORIDE 75 MG: 50 TABLET, FILM COATED ORAL at 22:48

## 2022-08-26 RX ADMIN — APIXABAN 5 MG: 5 TABLET, FILM COATED ORAL at 10:27

## 2022-08-26 RX ADMIN — LEVOTHYROXINE SODIUM 25 MCG: 0.03 TABLET ORAL at 10:35

## 2022-08-26 RX ADMIN — Medication 220 MG: at 10:19

## 2022-08-26 RX ADMIN — GUAIFENESIN 600 MG: 600 TABLET, EXTENDED RELEASE ORAL at 10:20

## 2022-08-26 RX ADMIN — LORAZEPAM 0.5 MG: 0.5 TABLET ORAL at 20:51

## 2022-08-26 RX ADMIN — HYDROCODONE BITARTRATE AND ACETAMINOPHEN 1 TABLET: 5; 325 TABLET ORAL at 10:18

## 2022-08-26 RX ADMIN — ROSUVASTATIN CALCIUM 5 MG: 5 TABLET, FILM COATED ORAL at 10:17

## 2022-08-26 RX ADMIN — HYDROCODONE BITARTRATE AND ACETAMINOPHEN 1 TABLET: 5; 325 TABLET ORAL at 22:48

## 2022-08-26 RX ADMIN — INSULIN LISPRO 7 UNITS: 100 INJECTION, SOLUTION INTRAVENOUS; SUBCUTANEOUS at 18:01

## 2022-08-26 RX ADMIN — INSULIN LISPRO 2 UNITS: 100 INJECTION, SOLUTION INTRAVENOUS; SUBCUTANEOUS at 14:09

## 2022-08-26 RX ADMIN — MORPHINE SULFATE 2 MG: 2 INJECTION, SOLUTION INTRAMUSCULAR; INTRAVENOUS at 07:57

## 2022-08-26 RX ADMIN — HYDRALAZINE HYDROCHLORIDE 75 MG: 50 TABLET, FILM COATED ORAL at 06:38

## 2022-08-26 RX ADMIN — MORPHINE SULFATE 2 MG: 2 INJECTION, SOLUTION INTRAMUSCULAR; INTRAVENOUS at 00:23

## 2022-08-26 RX ADMIN — CARVEDILOL 12.5 MG: 12.5 TABLET, FILM COATED ORAL at 10:19

## 2022-08-26 RX ADMIN — FAMOTIDINE 20 MG: 20 TABLET ORAL at 10:19

## 2022-08-26 RX ADMIN — CARVEDILOL 12.5 MG: 12.5 TABLET, FILM COATED ORAL at 20:52

## 2022-08-26 RX ADMIN — INSULIN LISPRO 7 UNITS: 100 INJECTION, SOLUTION INTRAVENOUS; SUBCUTANEOUS at 14:10

## 2022-08-26 RX ADMIN — Medication 1000 UNITS: at 10:28

## 2022-08-26 RX ADMIN — INSULIN GLARGINE-YFGN 22 UNITS: 100 INJECTION, SOLUTION SUBCUTANEOUS at 20:52

## 2022-08-26 RX ADMIN — METHOCARBAMOL TABLETS 750 MG: 500 TABLET, COATED ORAL at 14:09

## 2022-08-26 RX ADMIN — OXYCODONE HYDROCHLORIDE AND ACETAMINOPHEN 500 MG: 500 TABLET ORAL at 10:17

## 2022-08-26 RX ADMIN — Medication 1 MG: at 20:52

## 2022-08-26 RX ADMIN — GABAPENTIN 400 MG: 400 CAPSULE ORAL at 10:17

## 2022-08-26 RX ADMIN — ONDANSETRON 4 MG: 2 INJECTION INTRAMUSCULAR; INTRAVENOUS at 07:57

## 2022-08-26 RX ADMIN — LORAZEPAM 0.5 MG: 0.5 TABLET ORAL at 10:20

## 2022-08-26 RX ADMIN — APIXABAN 5 MG: 5 TABLET, FILM COATED ORAL at 22:48

## 2022-08-26 RX ADMIN — METHOCARBAMOL TABLETS 750 MG: 500 TABLET, COATED ORAL at 06:38

## 2022-08-26 RX ADMIN — INSULIN LISPRO 3 UNITS: 100 INJECTION, SOLUTION INTRAVENOUS; SUBCUTANEOUS at 18:01

## 2022-08-26 RX ADMIN — HYDRALAZINE HYDROCHLORIDE 75 MG: 50 TABLET, FILM COATED ORAL at 14:08

## 2022-08-26 RX ADMIN — HYDROCODONE BITARTRATE AND ACETAMINOPHEN 1 TABLET: 5; 325 TABLET ORAL at 14:08

## 2022-08-26 RX ADMIN — FAMOTIDINE 20 MG: 20 TABLET ORAL at 20:52

## 2022-08-26 RX ADMIN — CETIRIZINE HYDROCHLORIDE 5 MG: 10 TABLET ORAL at 16:18

## 2022-08-26 RX ADMIN — ACETAMINOPHEN 650 MG: 325 TABLET, FILM COATED ORAL at 20:52

## 2022-08-26 RX ADMIN — GUAIFENESIN 600 MG: 600 TABLET, EXTENDED RELEASE ORAL at 20:52

## 2022-08-26 RX ADMIN — MONTELUKAST SODIUM 10 MG: 10 TABLET, FILM COATED ORAL at 20:52

## 2022-08-26 RX ADMIN — BUDESONIDE AND FORMOTEROL FUMARATE DIHYDRATE 2 PUFF: 160; 4.5 AEROSOL RESPIRATORY (INHALATION) at 09:22

## 2022-08-26 RX ADMIN — INSULIN LISPRO 5 UNITS: 100 INJECTION, SOLUTION INTRAVENOUS; SUBCUTANEOUS at 20:52

## 2022-08-26 RX ADMIN — PREDNISONE 15 MG: 10 TABLET ORAL at 10:21

## 2022-08-26 RX ADMIN — BUDESONIDE AND FORMOTEROL FUMARATE DIHYDRATE 2 PUFF: 160; 4.5 AEROSOL RESPIRATORY (INHALATION) at 21:46

## 2022-08-26 RX ADMIN — POLYETHYLENE GLYCOL 3350 17 G: 17 POWDER, FOR SOLUTION ORAL at 20:52

## 2022-08-26 RX ADMIN — POLYETHYLENE GLYCOL 3350 17 G: 17 POWDER, FOR SOLUTION ORAL at 10:22

## 2022-08-26 NOTE — TELEPHONE ENCOUNTER
I called and s/w Sadia(nurse) and gave her follow up appointment with Dr. Erickson for 10/7 at 12:30 also informed her that she will need xrays prior to appointment.

## 2022-08-26 NOTE — PROGRESS NOTES
Patient currently admitted for osteoporotic fracture.  Per discussion with inpatient team, MD would like to start Promacta 50 mg daily.  Prescription sent to pharmacy and routed to MD for cosignature.     Juan Lizarraga, PharmD, BCOP  8/26/2022 15:16 EDT

## 2022-08-27 LAB
ANION GAP SERPL CALCULATED.3IONS-SCNC: 9.6 MMOL/L (ref 5–15)
BASOPHILS # BLD AUTO: 0.01 10*3/MM3 (ref 0–0.2)
BASOPHILS NFR BLD AUTO: 0.2 % (ref 0–1.5)
BUN SERPL-MCNC: 35 MG/DL (ref 8–23)
BUN/CREAT SERPL: 25.4 (ref 7–25)
CALCIUM SPEC-SCNC: 8.6 MG/DL (ref 8.2–9.6)
CHLORIDE SERPL-SCNC: 96 MMOL/L (ref 98–107)
CO2 SERPL-SCNC: 22.4 MMOL/L (ref 22–29)
CREAT SERPL-MCNC: 1.38 MG/DL (ref 0.57–1)
DEPRECATED RDW RBC AUTO: 43.3 FL (ref 37–54)
EGFRCR SERPLBLD CKD-EPI 2021: 36.2 ML/MIN/1.73
EOSINOPHIL # BLD AUTO: 0.01 10*3/MM3 (ref 0–0.4)
EOSINOPHIL NFR BLD AUTO: 0.2 % (ref 0.3–6.2)
ERYTHROCYTE [DISTWIDTH] IN BLOOD BY AUTOMATED COUNT: 13.9 % (ref 12.3–15.4)
FOLATE SERPL-MCNC: 6.69 NG/ML (ref 4.78–24.2)
GLUCOSE BLDC GLUCOMTR-MCNC: 256 MG/DL (ref 70–130)
GLUCOSE BLDC GLUCOMTR-MCNC: 260 MG/DL (ref 70–130)
GLUCOSE BLDC GLUCOMTR-MCNC: 284 MG/DL (ref 70–130)
GLUCOSE BLDC GLUCOMTR-MCNC: 398 MG/DL (ref 70–130)
GLUCOSE SERPL-MCNC: 265 MG/DL (ref 65–99)
HCT VFR BLD AUTO: 32.1 % (ref 34–46.6)
HGB BLD-MCNC: 10.5 G/DL (ref 12–15.9)
LYMPHOCYTES # BLD AUTO: 0.65 10*3/MM3 (ref 0.7–3.1)
LYMPHOCYTES NFR BLD AUTO: 15 % (ref 19.6–45.3)
MCH RBC QN AUTO: 27.9 PG (ref 26.6–33)
MCHC RBC AUTO-ENTMCNC: 32.7 G/DL (ref 31.5–35.7)
MCV RBC AUTO: 85.4 FL (ref 79–97)
MONOCYTES # BLD AUTO: 0.28 10*3/MM3 (ref 0.1–0.9)
MONOCYTES NFR BLD AUTO: 6.5 % (ref 5–12)
NEUTROPHILS NFR BLD AUTO: 3.35 10*3/MM3 (ref 1.7–7)
NEUTROPHILS NFR BLD AUTO: 77.6 % (ref 42.7–76)
PLATELET # BLD AUTO: 60 10*3/MM3 (ref 140–450)
PMV BLD AUTO: 11.6 FL (ref 6–12)
POTASSIUM SERPL-SCNC: 4.7 MMOL/L (ref 3.5–5.2)
RBC # BLD AUTO: 3.76 10*6/MM3 (ref 3.77–5.28)
SODIUM SERPL-SCNC: 128 MMOL/L (ref 136–145)
VIT B12 BLD-MCNC: 417 PG/ML (ref 211–946)
WBC NRBC COR # BLD: 4.32 10*3/MM3 (ref 3.4–10.8)

## 2022-08-27 PROCEDURE — 94799 UNLISTED PULMONARY SVC/PX: CPT

## 2022-08-27 PROCEDURE — 99232 SBSQ HOSP IP/OBS MODERATE 35: CPT | Performed by: INTERNAL MEDICINE

## 2022-08-27 PROCEDURE — 82746 ASSAY OF FOLIC ACID SERUM: CPT | Performed by: INTERNAL MEDICINE

## 2022-08-27 PROCEDURE — 63710000001 PREDNISONE PER 5 MG: Performed by: INTERNAL MEDICINE

## 2022-08-27 PROCEDURE — 94664 DEMO&/EVAL PT USE INHALER: CPT

## 2022-08-27 PROCEDURE — 82607 VITAMIN B-12: CPT | Performed by: INTERNAL MEDICINE

## 2022-08-27 PROCEDURE — 63710000001 INSULIN LISPRO (HUMAN) PER 5 UNITS: Performed by: HOSPITALIST

## 2022-08-27 PROCEDURE — 82962 GLUCOSE BLOOD TEST: CPT

## 2022-08-27 PROCEDURE — 80048 BASIC METABOLIC PNL TOTAL CA: CPT | Performed by: INTERNAL MEDICINE

## 2022-08-27 PROCEDURE — 85025 COMPLETE CBC W/AUTO DIFF WBC: CPT | Performed by: INTERNAL MEDICINE

## 2022-08-27 PROCEDURE — 63710000001 INSULIN LISPRO (HUMAN) PER 5 UNITS: Performed by: INTERNAL MEDICINE

## 2022-08-27 PROCEDURE — 97530 THERAPEUTIC ACTIVITIES: CPT

## 2022-08-27 RX ORDER — FERROUS SULFATE 325(65) MG
325 TABLET ORAL
Status: DISCONTINUED | OUTPATIENT
Start: 2022-08-28 | End: 2022-08-29 | Stop reason: HOSPADM

## 2022-08-27 RX ORDER — PREDNISONE 10 MG/1
15 TABLET ORAL
Status: DISCONTINUED | OUTPATIENT
Start: 2022-08-27 | End: 2022-08-29 | Stop reason: HOSPADM

## 2022-08-27 RX ORDER — INSULIN LISPRO 100 [IU]/ML
9 INJECTION, SOLUTION INTRAVENOUS; SUBCUTANEOUS
Status: DISCONTINUED | OUTPATIENT
Start: 2022-08-27 | End: 2022-08-28

## 2022-08-27 RX ADMIN — INSULIN LISPRO 4 UNITS: 100 INJECTION, SOLUTION INTRAVENOUS; SUBCUTANEOUS at 09:15

## 2022-08-27 RX ADMIN — LEVOTHYROXINE SODIUM 25 MCG: 0.03 TABLET ORAL at 09:14

## 2022-08-27 RX ADMIN — POLYETHYLENE GLYCOL 3350 17 G: 17 POWDER, FOR SOLUTION ORAL at 09:14

## 2022-08-27 RX ADMIN — Medication 1000 UNITS: at 09:14

## 2022-08-27 RX ADMIN — HYDRALAZINE HYDROCHLORIDE 75 MG: 50 TABLET, FILM COATED ORAL at 06:15

## 2022-08-27 RX ADMIN — OXYCODONE HYDROCHLORIDE AND ACETAMINOPHEN 500 MG: 500 TABLET ORAL at 09:14

## 2022-08-27 RX ADMIN — INSULIN LISPRO 9 UNITS: 100 INJECTION, SOLUTION INTRAVENOUS; SUBCUTANEOUS at 12:14

## 2022-08-27 RX ADMIN — CETIRIZINE HYDROCHLORIDE 5 MG: 10 TABLET ORAL at 09:14

## 2022-08-27 RX ADMIN — HYDRALAZINE HYDROCHLORIDE 75 MG: 50 TABLET, FILM COATED ORAL at 14:30

## 2022-08-27 RX ADMIN — INSULIN LISPRO 9 UNITS: 100 INJECTION, SOLUTION INTRAVENOUS; SUBCUTANEOUS at 18:16

## 2022-08-27 RX ADMIN — LORAZEPAM 0.5 MG: 0.5 TABLET ORAL at 20:51

## 2022-08-27 RX ADMIN — Medication 1 MG: at 20:51

## 2022-08-27 RX ADMIN — AMLODIPINE BESYLATE 10 MG: 5 TABLET ORAL at 09:14

## 2022-08-27 RX ADMIN — APIXABAN 5 MG: 5 TABLET, FILM COATED ORAL at 09:14

## 2022-08-27 RX ADMIN — PREDNISONE 15 MG: 10 TABLET ORAL at 12:13

## 2022-08-27 RX ADMIN — INSULIN LISPRO 6 UNITS: 100 INJECTION, SOLUTION INTRAVENOUS; SUBCUTANEOUS at 20:51

## 2022-08-27 RX ADMIN — HYDRALAZINE HYDROCHLORIDE 75 MG: 50 TABLET, FILM COATED ORAL at 20:51

## 2022-08-27 RX ADMIN — HYDROCODONE BITARTRATE AND ACETAMINOPHEN 1 TABLET: 5; 325 TABLET ORAL at 06:15

## 2022-08-27 RX ADMIN — INSULIN LISPRO 9 UNITS: 100 INJECTION, SOLUTION INTRAVENOUS; SUBCUTANEOUS at 09:15

## 2022-08-27 RX ADMIN — CARVEDILOL 12.5 MG: 12.5 TABLET, FILM COATED ORAL at 20:51

## 2022-08-27 RX ADMIN — APIXABAN 5 MG: 5 TABLET, FILM COATED ORAL at 20:51

## 2022-08-27 RX ADMIN — TAMSULOSIN HYDROCHLORIDE 0.4 MG: 0.4 CAPSULE ORAL at 09:14

## 2022-08-27 RX ADMIN — BUDESONIDE AND FORMOTEROL FUMARATE DIHYDRATE 2 PUFF: 160; 4.5 AEROSOL RESPIRATORY (INHALATION) at 12:27

## 2022-08-27 RX ADMIN — GABAPENTIN 400 MG: 400 CAPSULE ORAL at 20:51

## 2022-08-27 RX ADMIN — FUROSEMIDE 20 MG: 20 TABLET ORAL at 09:14

## 2022-08-27 RX ADMIN — MONTELUKAST SODIUM 10 MG: 10 TABLET, FILM COATED ORAL at 20:51

## 2022-08-27 RX ADMIN — INSULIN LISPRO 4 UNITS: 100 INJECTION, SOLUTION INTRAVENOUS; SUBCUTANEOUS at 18:16

## 2022-08-27 RX ADMIN — ROSUVASTATIN CALCIUM 5 MG: 5 TABLET, FILM COATED ORAL at 09:14

## 2022-08-27 RX ADMIN — INSULIN LISPRO 4 UNITS: 100 INJECTION, SOLUTION INTRAVENOUS; SUBCUTANEOUS at 12:13

## 2022-08-27 RX ADMIN — POLYETHYLENE GLYCOL 3350 17 G: 17 POWDER, FOR SOLUTION ORAL at 20:51

## 2022-08-27 RX ADMIN — METHOCARBAMOL TABLETS 750 MG: 500 TABLET, COATED ORAL at 14:29

## 2022-08-27 RX ADMIN — FAMOTIDINE 20 MG: 20 TABLET ORAL at 20:51

## 2022-08-27 RX ADMIN — HYDROCODONE BITARTRATE AND ACETAMINOPHEN 1 TABLET: 5; 325 TABLET ORAL at 12:21

## 2022-08-27 RX ADMIN — GUAIFENESIN 600 MG: 600 TABLET, EXTENDED RELEASE ORAL at 20:51

## 2022-08-27 RX ADMIN — ACETAMINOPHEN 650 MG: 325 TABLET, FILM COATED ORAL at 20:51

## 2022-08-27 RX ADMIN — GABAPENTIN 400 MG: 400 CAPSULE ORAL at 09:14

## 2022-08-27 RX ADMIN — GUAIFENESIN 600 MG: 600 TABLET, EXTENDED RELEASE ORAL at 09:14

## 2022-08-27 RX ADMIN — Medication 220 MG: at 09:14

## 2022-08-27 RX ADMIN — CARVEDILOL 12.5 MG: 12.5 TABLET, FILM COATED ORAL at 11:08

## 2022-08-27 RX ADMIN — HYDROCODONE BITARTRATE AND ACETAMINOPHEN 1 TABLET: 5; 325 TABLET ORAL at 20:51

## 2022-08-27 RX ADMIN — FAMOTIDINE 20 MG: 20 TABLET ORAL at 09:14

## 2022-08-27 RX ADMIN — BUDESONIDE AND FORMOTEROL FUMARATE DIHYDRATE 2 PUFF: 160; 4.5 AEROSOL RESPIRATORY (INHALATION) at 21:46

## 2022-08-27 RX ADMIN — INSULIN GLARGINE-YFGN 24 UNITS: 100 INJECTION, SOLUTION SUBCUTANEOUS at 20:55

## 2022-08-28 PROBLEM — D69.3 CHRONIC ITP (IDIOPATHIC THROMBOCYTOPENIA) (HCC): Status: ACTIVE | Noted: 2022-08-28

## 2022-08-28 LAB
GLUCOSE BLDC GLUCOMTR-MCNC: 259 MG/DL (ref 70–130)
GLUCOSE BLDC GLUCOMTR-MCNC: 275 MG/DL (ref 70–130)
GLUCOSE BLDC GLUCOMTR-MCNC: 290 MG/DL (ref 70–130)
GLUCOSE BLDC GLUCOMTR-MCNC: 299 MG/DL (ref 70–130)

## 2022-08-28 PROCEDURE — 63710000001 INSULIN LISPRO (HUMAN) PER 5 UNITS: Performed by: INTERNAL MEDICINE

## 2022-08-28 PROCEDURE — 94799 UNLISTED PULMONARY SVC/PX: CPT

## 2022-08-28 PROCEDURE — 63710000001 INSULIN LISPRO (HUMAN) PER 5 UNITS: Performed by: HOSPITALIST

## 2022-08-28 PROCEDURE — 94664 DEMO&/EVAL PT USE INHALER: CPT

## 2022-08-28 PROCEDURE — 63710000001 PREDNISONE PER 5 MG: Performed by: INTERNAL MEDICINE

## 2022-08-28 PROCEDURE — 94761 N-INVAS EAR/PLS OXIMETRY MLT: CPT

## 2022-08-28 PROCEDURE — 82962 GLUCOSE BLOOD TEST: CPT

## 2022-08-28 RX ORDER — HYDROCODONE BITARTRATE AND ACETAMINOPHEN 7.5; 325 MG/1; MG/1
1 TABLET ORAL EVERY 4 HOURS PRN
Status: DISCONTINUED | OUTPATIENT
Start: 2022-08-28 | End: 2022-08-29 | Stop reason: HOSPADM

## 2022-08-28 RX ORDER — INSULIN LISPRO 100 [IU]/ML
10 INJECTION, SOLUTION INTRAVENOUS; SUBCUTANEOUS
Status: DISCONTINUED | OUTPATIENT
Start: 2022-08-28 | End: 2022-08-29 | Stop reason: HOSPADM

## 2022-08-28 RX ADMIN — HYDROCODONE BITARTRATE AND ACETAMINOPHEN 1 TABLET: 7.5; 325 TABLET ORAL at 18:21

## 2022-08-28 RX ADMIN — LEVOTHYROXINE SODIUM 25 MCG: 0.03 TABLET ORAL at 09:40

## 2022-08-28 RX ADMIN — APIXABAN 5 MG: 5 TABLET, FILM COATED ORAL at 09:43

## 2022-08-28 RX ADMIN — FAMOTIDINE 20 MG: 20 TABLET ORAL at 21:04

## 2022-08-28 RX ADMIN — APIXABAN 5 MG: 5 TABLET, FILM COATED ORAL at 21:04

## 2022-08-28 RX ADMIN — POLYETHYLENE GLYCOL 3350 17 G: 17 POWDER, FOR SOLUTION ORAL at 21:04

## 2022-08-28 RX ADMIN — GUAIFENESIN 600 MG: 600 TABLET, EXTENDED RELEASE ORAL at 21:04

## 2022-08-28 RX ADMIN — INSULIN LISPRO 4 UNITS: 100 INJECTION, SOLUTION INTRAVENOUS; SUBCUTANEOUS at 09:44

## 2022-08-28 RX ADMIN — MONTELUKAST SODIUM 10 MG: 10 TABLET, FILM COATED ORAL at 21:05

## 2022-08-28 RX ADMIN — POLYETHYLENE GLYCOL 3350 17 G: 17 POWDER, FOR SOLUTION ORAL at 09:40

## 2022-08-28 RX ADMIN — INSULIN GLARGINE-YFGN 30 UNITS: 100 INJECTION, SOLUTION SUBCUTANEOUS at 21:04

## 2022-08-28 RX ADMIN — Medication 220 MG: at 09:43

## 2022-08-28 RX ADMIN — INSULIN LISPRO 10 UNITS: 100 INJECTION, SOLUTION INTRAVENOUS; SUBCUTANEOUS at 14:02

## 2022-08-28 RX ADMIN — Medication 1000 UNITS: at 09:43

## 2022-08-28 RX ADMIN — FERROUS SULFATE TAB 325 MG (65 MG ELEMENTAL FE) 325 MG: 325 (65 FE) TAB at 09:41

## 2022-08-28 RX ADMIN — ACETAMINOPHEN 650 MG: 325 TABLET, FILM COATED ORAL at 21:04

## 2022-08-28 RX ADMIN — HYDROCODONE BITARTRATE AND ACETAMINOPHEN 1 TABLET: 5; 325 TABLET ORAL at 14:07

## 2022-08-28 RX ADMIN — BUDESONIDE AND FORMOTEROL FUMARATE DIHYDRATE 2 PUFF: 160; 4.5 AEROSOL RESPIRATORY (INHALATION) at 08:59

## 2022-08-28 RX ADMIN — METHOCARBAMOL TABLETS 750 MG: 500 TABLET, COATED ORAL at 21:08

## 2022-08-28 RX ADMIN — INSULIN LISPRO 4 UNITS: 100 INJECTION, SOLUTION INTRAVENOUS; SUBCUTANEOUS at 18:24

## 2022-08-28 RX ADMIN — OXYCODONE HYDROCHLORIDE AND ACETAMINOPHEN 500 MG: 500 TABLET ORAL at 09:41

## 2022-08-28 RX ADMIN — CARVEDILOL 12.5 MG: 12.5 TABLET, FILM COATED ORAL at 21:04

## 2022-08-28 RX ADMIN — ROSUVASTATIN CALCIUM 5 MG: 5 TABLET, FILM COATED ORAL at 09:43

## 2022-08-28 RX ADMIN — TAMSULOSIN HYDROCHLORIDE 0.4 MG: 0.4 CAPSULE ORAL at 09:43

## 2022-08-28 RX ADMIN — CARVEDILOL 12.5 MG: 12.5 TABLET, FILM COATED ORAL at 09:41

## 2022-08-28 RX ADMIN — GUAIFENESIN 600 MG: 600 TABLET, EXTENDED RELEASE ORAL at 09:40

## 2022-08-28 RX ADMIN — BUDESONIDE AND FORMOTEROL FUMARATE DIHYDRATE 2 PUFF: 160; 4.5 AEROSOL RESPIRATORY (INHALATION) at 20:53

## 2022-08-28 RX ADMIN — INSULIN LISPRO 9 UNITS: 100 INJECTION, SOLUTION INTRAVENOUS; SUBCUTANEOUS at 09:43

## 2022-08-28 RX ADMIN — CETIRIZINE HYDROCHLORIDE 5 MG: 10 TABLET ORAL at 09:43

## 2022-08-28 RX ADMIN — HYDRALAZINE HYDROCHLORIDE 75 MG: 50 TABLET, FILM COATED ORAL at 14:10

## 2022-08-28 RX ADMIN — HYDROCODONE BITARTRATE AND ACETAMINOPHEN 1 TABLET: 5; 325 TABLET ORAL at 09:41

## 2022-08-28 RX ADMIN — HYDROCODONE BITARTRATE AND ACETAMINOPHEN 1 TABLET: 5; 325 TABLET ORAL at 06:23

## 2022-08-28 RX ADMIN — GABAPENTIN 400 MG: 400 CAPSULE ORAL at 09:40

## 2022-08-28 RX ADMIN — Medication 1 MG: at 21:04

## 2022-08-28 RX ADMIN — PREDNISONE 15 MG: 10 TABLET ORAL at 09:40

## 2022-08-28 RX ADMIN — INSULIN LISPRO 10 UNITS: 100 INJECTION, SOLUTION INTRAVENOUS; SUBCUTANEOUS at 18:25

## 2022-08-28 RX ADMIN — LORAZEPAM 0.5 MG: 0.5 TABLET ORAL at 14:07

## 2022-08-28 RX ADMIN — AMLODIPINE BESYLATE 10 MG: 5 TABLET ORAL at 09:43

## 2022-08-28 RX ADMIN — GABAPENTIN 400 MG: 400 CAPSULE ORAL at 21:04

## 2022-08-28 RX ADMIN — HYDRALAZINE HYDROCHLORIDE 75 MG: 50 TABLET, FILM COATED ORAL at 06:23

## 2022-08-28 RX ADMIN — METHOCARBAMOL TABLETS 750 MG: 500 TABLET, COATED ORAL at 14:00

## 2022-08-28 RX ADMIN — HYDRALAZINE HYDROCHLORIDE 75 MG: 50 TABLET, FILM COATED ORAL at 21:04

## 2022-08-28 RX ADMIN — INSULIN LISPRO 4 UNITS: 100 INJECTION, SOLUTION INTRAVENOUS; SUBCUTANEOUS at 14:01

## 2022-08-28 RX ADMIN — FUROSEMIDE 20 MG: 20 TABLET ORAL at 09:40

## 2022-08-28 RX ADMIN — LORAZEPAM 0.5 MG: 0.5 TABLET ORAL at 21:04

## 2022-08-28 RX ADMIN — INSULIN LISPRO 4 UNITS: 100 INJECTION, SOLUTION INTRAVENOUS; SUBCUTANEOUS at 21:04

## 2022-08-29 ENCOUNTER — APPOINTMENT (OUTPATIENT)
Dept: LAB | Facility: HOSPITAL | Age: 87
End: 2022-08-29

## 2022-08-29 ENCOUNTER — SPECIALTY PHARMACY (OUTPATIENT)
Dept: PHARMACY | Facility: HOSPITAL | Age: 87
End: 2022-08-29

## 2022-08-29 ENCOUNTER — APPOINTMENT (OUTPATIENT)
Dept: ONCOLOGY | Facility: HOSPITAL | Age: 87
End: 2022-08-29

## 2022-08-29 VITALS
OXYGEN SATURATION: 97 % | WEIGHT: 155 LBS | SYSTOLIC BLOOD PRESSURE: 156 MMHG | HEIGHT: 57 IN | RESPIRATION RATE: 16 BRPM | TEMPERATURE: 97.5 F | HEART RATE: 54 BPM | DIASTOLIC BLOOD PRESSURE: 74 MMHG | BODY MASS INDEX: 33.44 KG/M2

## 2022-08-29 LAB
BASOPHILS # BLD AUTO: 0.01 10*3/MM3 (ref 0–0.2)
BASOPHILS NFR BLD AUTO: 0.1 % (ref 0–1.5)
DEPRECATED RDW RBC AUTO: 44.8 FL (ref 37–54)
EOSINOPHIL # BLD AUTO: 0.08 10*3/MM3 (ref 0–0.4)
EOSINOPHIL NFR BLD AUTO: 0.9 % (ref 0.3–6.2)
ERYTHROCYTE [DISTWIDTH] IN BLOOD BY AUTOMATED COUNT: 14.2 % (ref 12.3–15.4)
GLUCOSE BLDC GLUCOMTR-MCNC: 214 MG/DL (ref 70–130)
GLUCOSE BLDC GLUCOMTR-MCNC: 248 MG/DL (ref 70–130)
HCT VFR BLD AUTO: 36.5 % (ref 34–46.6)
HGB BLD-MCNC: 11.8 G/DL (ref 12–15.9)
IMM GRANULOCYTES # BLD AUTO: 0.13 10*3/MM3 (ref 0–0.05)
IMM GRANULOCYTES NFR BLD AUTO: 1.4 % (ref 0–0.5)
LYMPHOCYTES # BLD AUTO: 1.05 10*3/MM3 (ref 0.7–3.1)
LYMPHOCYTES NFR BLD AUTO: 11.5 % (ref 19.6–45.3)
MCH RBC QN AUTO: 28 PG (ref 26.6–33)
MCHC RBC AUTO-ENTMCNC: 32.3 G/DL (ref 31.5–35.7)
MCV RBC AUTO: 86.7 FL (ref 79–97)
MONOCYTES # BLD AUTO: 0.8 10*3/MM3 (ref 0.1–0.9)
MONOCYTES NFR BLD AUTO: 8.7 % (ref 5–12)
NEUTROPHILS NFR BLD AUTO: 7.08 10*3/MM3 (ref 1.7–7)
NEUTROPHILS NFR BLD AUTO: 77.4 % (ref 42.7–76)
NRBC BLD AUTO-RTO: 0 /100 WBC (ref 0–0.2)
PLATELET # BLD AUTO: 108 10*3/MM3 (ref 140–450)
PMV BLD AUTO: 11.1 FL (ref 6–12)
RBC # BLD AUTO: 4.21 10*6/MM3 (ref 3.77–5.28)
WBC NRBC COR # BLD: 9.15 10*3/MM3 (ref 3.4–10.8)

## 2022-08-29 PROCEDURE — 85025 COMPLETE CBC W/AUTO DIFF WBC: CPT | Performed by: INTERNAL MEDICINE

## 2022-08-29 PROCEDURE — 25010000002 COVID-19 MRNA VAC-TRIS(PFIZER) 30 MCG/0.3ML SUSPENSION: Performed by: INTERNAL MEDICINE

## 2022-08-29 PROCEDURE — 94799 UNLISTED PULMONARY SVC/PX: CPT

## 2022-08-29 PROCEDURE — 91305 COVID-19 MRNA VAC-TRIS(PFIZER) 30 MCG/0.3ML SUSPENSION: CPT | Performed by: INTERNAL MEDICINE

## 2022-08-29 PROCEDURE — 94760 N-INVAS EAR/PLS OXIMETRY 1: CPT

## 2022-08-29 PROCEDURE — 63710000001 PREDNISONE PER 5 MG: Performed by: INTERNAL MEDICINE

## 2022-08-29 PROCEDURE — 82962 GLUCOSE BLOOD TEST: CPT

## 2022-08-29 PROCEDURE — 97530 THERAPEUTIC ACTIVITIES: CPT

## 2022-08-29 PROCEDURE — 94761 N-INVAS EAR/PLS OXIMETRY MLT: CPT

## 2022-08-29 PROCEDURE — 63710000001 INSULIN LISPRO (HUMAN) PER 5 UNITS: Performed by: INTERNAL MEDICINE

## 2022-08-29 PROCEDURE — 94664 DEMO&/EVAL PT USE INHALER: CPT

## 2022-08-29 PROCEDURE — 63710000001 INSULIN LISPRO (HUMAN) PER 5 UNITS: Performed by: HOSPITALIST

## 2022-08-29 RX ORDER — HYDRALAZINE HYDROCHLORIDE 25 MG/1
75 TABLET, FILM COATED ORAL EVERY 8 HOURS SCHEDULED
Status: ON HOLD
Start: 2022-08-29 | End: 2022-12-25 | Stop reason: SDUPTHER

## 2022-08-29 RX ORDER — DIPHENHYDRAMINE HCL 25 MG
50 CAPSULE ORAL ONCE AS NEEDED
Status: DISCONTINUED | OUTPATIENT
Start: 2022-08-29 | End: 2022-08-29 | Stop reason: HOSPADM

## 2022-08-29 RX ORDER — ALBUTEROL SULFATE 2.5 MG/3ML
2.5 SOLUTION RESPIRATORY (INHALATION) EVERY 6 HOURS PRN
Refills: 12
Start: 2022-08-29 | End: 2022-09-27 | Stop reason: ALTCHOICE

## 2022-08-29 RX ORDER — MELATONIN
1000 DAILY
Status: ON HOLD
Start: 2022-08-30

## 2022-08-29 RX ORDER — METHOCARBAMOL 750 MG/1
750 TABLET, FILM COATED ORAL EVERY 6 HOURS PRN
Status: ON HOLD
Start: 2022-08-29 | End: 2022-12-25 | Stop reason: SDUPTHER

## 2022-08-29 RX ORDER — UREA 10 %
1 LOTION (ML) TOPICAL NIGHTLY
Qty: 30 TABLET
Start: 2022-08-29 | End: 2022-09-09

## 2022-08-29 RX ORDER — BUDESONIDE AND FORMOTEROL FUMARATE DIHYDRATE 160; 4.5 UG/1; UG/1
2 AEROSOL RESPIRATORY (INHALATION)
Refills: 12 | Status: ON HOLD
Start: 2022-08-29 | End: 2022-12-20

## 2022-08-29 RX ORDER — ASCORBIC ACID 500 MG
500 TABLET ORAL DAILY
Start: 2022-08-30 | End: 2022-09-27 | Stop reason: ALTCHOICE

## 2022-08-29 RX ORDER — GUAIFENESIN 600 MG/1
600 TABLET, EXTENDED RELEASE ORAL EVERY 12 HOURS SCHEDULED
Qty: 10 TABLET | Refills: 0 | Status: SHIPPED | OUTPATIENT
Start: 2022-08-29 | End: 2022-09-03

## 2022-08-29 RX ORDER — PREDNISONE 1 MG/1
15 TABLET ORAL
Start: 2022-08-30 | End: 2022-11-02 | Stop reason: SDUPTHER

## 2022-08-29 RX ORDER — FERROUS SULFATE 325(65) MG
325 TABLET ORAL
Start: 2022-08-30 | End: 2022-09-14

## 2022-08-29 RX ORDER — LORAZEPAM 0.5 MG/1
0.5 TABLET ORAL EVERY 8 HOURS PRN
Qty: 10 TABLET | Refills: 0 | Status: ON HOLD | OUTPATIENT
Start: 2022-08-29 | End: 2023-03-14 | Stop reason: SDUPTHER

## 2022-08-29 RX ORDER — INSULIN LISPRO 100 [IU]/ML
10 INJECTION, SOLUTION INTRAVENOUS; SUBCUTANEOUS
Refills: 12
Start: 2022-08-29 | End: 2022-09-27 | Stop reason: ALTCHOICE

## 2022-08-29 RX ORDER — HYDROCODONE BITARTRATE AND ACETAMINOPHEN 7.5; 325 MG/1; MG/1
1 TABLET ORAL EVERY 4 HOURS PRN
Qty: 18 TABLET | Refills: 0 | Status: SHIPPED | OUTPATIENT
Start: 2022-08-29 | End: 2022-09-04

## 2022-08-29 RX ORDER — CETIRIZINE HYDROCHLORIDE 5 MG/1
5 TABLET ORAL DAILY PRN
Status: ON HOLD
Start: 2022-08-29

## 2022-08-29 RX ADMIN — GUAIFENESIN 600 MG: 600 TABLET, EXTENDED RELEASE ORAL at 09:38

## 2022-08-29 RX ADMIN — FUROSEMIDE 20 MG: 20 TABLET ORAL at 09:38

## 2022-08-29 RX ADMIN — HYDRALAZINE HYDROCHLORIDE 75 MG: 50 TABLET, FILM COATED ORAL at 06:32

## 2022-08-29 RX ADMIN — INSULIN LISPRO 3 UNITS: 100 INJECTION, SOLUTION INTRAVENOUS; SUBCUTANEOUS at 12:39

## 2022-08-29 RX ADMIN — FERROUS SULFATE TAB 325 MG (65 MG ELEMENTAL FE) 325 MG: 325 (65 FE) TAB at 09:38

## 2022-08-29 RX ADMIN — HYDROCODONE BITARTRATE AND ACETAMINOPHEN 1 TABLET: 7.5; 325 TABLET ORAL at 18:15

## 2022-08-29 RX ADMIN — CARVEDILOL 12.5 MG: 12.5 TABLET, FILM COATED ORAL at 09:38

## 2022-08-29 RX ADMIN — METHOCARBAMOL TABLETS 750 MG: 500 TABLET, COATED ORAL at 12:45

## 2022-08-29 RX ADMIN — BNT162B2 0.3 ML: 0.23 INJECTION, SUSPENSION INTRAMUSCULAR at 16:26

## 2022-08-29 RX ADMIN — POLYETHYLENE GLYCOL 3350 17 G: 17 POWDER, FOR SOLUTION ORAL at 09:37

## 2022-08-29 RX ADMIN — INSULIN LISPRO 3 UNITS: 100 INJECTION, SOLUTION INTRAVENOUS; SUBCUTANEOUS at 09:39

## 2022-08-29 RX ADMIN — LEVOTHYROXINE SODIUM 25 MCG: 0.03 TABLET ORAL at 06:32

## 2022-08-29 RX ADMIN — Medication 220 MG: at 09:37

## 2022-08-29 RX ADMIN — INSULIN LISPRO 10 UNITS: 100 INJECTION, SOLUTION INTRAVENOUS; SUBCUTANEOUS at 09:37

## 2022-08-29 RX ADMIN — FAMOTIDINE 20 MG: 20 TABLET ORAL at 09:38

## 2022-08-29 RX ADMIN — ROSUVASTATIN CALCIUM 5 MG: 5 TABLET, FILM COATED ORAL at 09:38

## 2022-08-29 RX ADMIN — HYDROCODONE BITARTRATE AND ACETAMINOPHEN 1 TABLET: 7.5; 325 TABLET ORAL at 12:39

## 2022-08-29 RX ADMIN — CETIRIZINE HYDROCHLORIDE 5 MG: 10 TABLET ORAL at 09:37

## 2022-08-29 RX ADMIN — Medication 1000 UNITS: at 09:38

## 2022-08-29 RX ADMIN — BUDESONIDE AND FORMOTEROL FUMARATE DIHYDRATE 2 PUFF: 160; 4.5 AEROSOL RESPIRATORY (INHALATION) at 07:21

## 2022-08-29 RX ADMIN — AMLODIPINE BESYLATE 10 MG: 5 TABLET ORAL at 09:38

## 2022-08-29 RX ADMIN — GABAPENTIN 400 MG: 400 CAPSULE ORAL at 09:37

## 2022-08-29 RX ADMIN — PREDNISONE 15 MG: 10 TABLET ORAL at 09:38

## 2022-08-29 RX ADMIN — OXYCODONE HYDROCHLORIDE AND ACETAMINOPHEN 500 MG: 500 TABLET ORAL at 09:38

## 2022-08-29 RX ADMIN — INSULIN LISPRO 10 UNITS: 100 INJECTION, SOLUTION INTRAVENOUS; SUBCUTANEOUS at 12:39

## 2022-08-29 RX ADMIN — APIXABAN 5 MG: 5 TABLET, FILM COATED ORAL at 09:38

## 2022-08-29 RX ADMIN — TAMSULOSIN HYDROCHLORIDE 0.4 MG: 0.4 CAPSULE ORAL at 09:38

## 2022-08-29 RX ADMIN — HYDROCODONE BITARTRATE AND ACETAMINOPHEN 1 TABLET: 7.5; 325 TABLET ORAL at 06:32

## 2022-08-29 NOTE — PROGRESS NOTES
I called [facility] to check the status on the Promacta. The representative told me that it would be shipped out on 8/30 or 8/31 via [employer]. However, it could be expedited for $21 extra. I called Radha to let her know and gave her the phone number to do this if they wanted to get medication in an expedited manner.

## 2022-08-29 NOTE — PROGRESS NOTES
Informed by Terri Jc that pt was to be starting Promacta. I looked into the availability and pricing of the medication. I called [facility] pharmacy help desk. I found out that Promacta is covered without a PA. It is $38 thru mail order at [facility] pharmacy and $114 at a local pharmacy. [facility] if [facility] is on formulary for no cost. I called the local [facility] and found out that it's not on the formulary. It would take a long time to get it on the formulary before they could order it for the pt. Pt will get from mail order.

## 2022-08-31 ENCOUNTER — TELEPHONE (OUTPATIENT)
Dept: NEUROSURGERY | Facility: CLINIC | Age: 87
End: 2022-08-31

## 2022-09-01 NOTE — TELEPHONE ENCOUNTER
I called and s/w Andrzej and told her that a OTC lumbar support was recommend.  Patient will be notified.

## 2022-09-02 ENCOUNTER — APPOINTMENT (OUTPATIENT)
Dept: LAB | Facility: HOSPITAL | Age: 87
End: 2022-09-02

## 2022-09-06 ENCOUNTER — HOSPITAL ENCOUNTER (OUTPATIENT)
Dept: CARDIOLOGY | Facility: HOSPITAL | Age: 87
Discharge: HOME | End: 2022-09-06
Admitting: NURSE PRACTITIONER

## 2022-09-06 VITALS
HEIGHT: 57 IN | SYSTOLIC BLOOD PRESSURE: 146 MMHG | WEIGHT: 165.8 LBS | OXYGEN SATURATION: 97 % | HEART RATE: 59 BPM | BODY MASS INDEX: 35.77 KG/M2 | DIASTOLIC BLOOD PRESSURE: 55 MMHG

## 2022-09-06 DIAGNOSIS — E78.1 HYPERTRIGLYCERIDEMIA: ICD-10-CM

## 2022-09-06 DIAGNOSIS — D69.3 CHRONIC ITP (IDIOPATHIC THROMBOCYTOPENIA): ICD-10-CM

## 2022-09-06 DIAGNOSIS — N18.32 STAGE 3B CHRONIC KIDNEY DISEASE: ICD-10-CM

## 2022-09-06 DIAGNOSIS — I48.0 PAROXYSMAL ATRIAL FIBRILLATION: ICD-10-CM

## 2022-09-06 DIAGNOSIS — I50.32 CHRONIC HEART FAILURE WITH PRESERVED EJECTION FRACTION: Primary | ICD-10-CM

## 2022-09-06 DIAGNOSIS — I27.20 PULMONARY HYPERTENSION: ICD-10-CM

## 2022-09-06 DIAGNOSIS — I10 ESSENTIAL HYPERTENSION: ICD-10-CM

## 2022-09-06 PROCEDURE — 99214 OFFICE O/P EST MOD 30 MIN: CPT | Performed by: NURSE PRACTITIONER

## 2022-09-06 PROCEDURE — G0463 HOSPITAL OUTPT CLINIC VISIT: HCPCS

## 2022-09-06 RX ORDER — LIDOCAINE 50 MG/G
PATCH TOPICAL
COMMUNITY
Start: 2022-08-23 | End: 2022-09-06

## 2022-09-06 NOTE — PROGRESS NOTES
[facility] [place] [facility]ART [facility]      Patient Name: Helena Carmen  :1931  Age: 89 y.o.  Sex: female  Referring Provider: Mango Arnold MD   Primary Cardiologist: Beth Butcher MD  Encounter Provider:  RIGO Lowe      Chief Complaint:   Chief Complaint   Patient presents with   • Congestive Heart Failure         Congestive Heart Failure  Associated symptoms include fatigue and shortness of breath. Pertinent negatives include no chest pain, palpitations or unexpected weight change.    this 89-year-old female, new to this provider, comes today for further evaluation regarding her chronic diastolic heart failure.  Current diagnoses to include CKD 4, aortic valve disease status post history of AVR with tissue valve, hyperlipidemia, hypertension, hiatal hernia, legally blind, paroxysmal atrial fibrillation, pulmonary hypertension, type 2 diabetes mellitus, history of uterine cancer.    Her history of diastolic dysfunction goes back as far as at least .  Repeat echocardiogram 2019 revealed LVEF of 56% with grade 2 LV diastolic dysfunction, RVSP of 69 mmHg, trivial pericardial effusion noted at that time thus Lasix was started.    It is noted that during appointment with RIGO late 2022 that she had been off of spironolactone for 1 to 2 weeks and with that had increased bilateral lower extremity edema as well as abdominal distention.  She received 1 dose of IV Lasix in clinic on 2022 and oral Lasix was restarted at 40 mg twice daily.  Nephrology, of note, stopped losartan, increased hydralazine, and decreased Lasix on 2022.    Admission from 3/2/2022 to 3/9/2022 was noted with acute on chronic exacerbation of her diastolic heart failure and acute kidney injury.  She has since been treated with Lasix 40 mg every other day and daily spironolactone per nephrology recommendation.    She was rehospitalized in 2022 and has since been staying at [place].  She was  readmitted again 8/5-8/10/2022 with COVID 19 pneumonia and found to have small bilateral DVTs.  She was treated with lovenox and transitioned to Eliquis. During admission she was found to be hypertensive.     Carvedilol was transitioned to metoprolol and she is tolerating this well.  She does have some petechiae on her lower extremities after scratching intensely and some mild lower extremity edema, but this is stable per her baseline.      The following portions of the patient's history were reviewed and updated as appropriate: allergies, current medications, past family history, past medical history, past social history, past surgical history and problem list.    Current Outpatient Medications   Medication Sig Dispense Refill   • acetaminophen (TYLENOL) 325 MG tablet Take 650 mg by mouth Every 6 (Six) Hours As Needed.     • albuterol (PROVENTIL) (2.5 MG/3ML) 0.083% nebulizer solution Take 2.5 mg by nebulization Every 6 (Six) Hours As Needed for Shortness of Air.  12   • amLODIPine (NORVASC) 10 MG tablet Take 1 tablet by mouth daily.     • apixaban (ELIQUIS) 5 MG tablet tablet Take 1 tablet by mouth Every 12 (Twelve) Hours for 30 days. 60 tablet 0   • ascorbic acid (VITAMIN C) 500 MG tablet Take 1 tablet by mouth Daily.     • budesonide-formoterol (SYMBICORT) 160-4.5 MCG/ACT inhaler Inhale 2 puffs 2 (Two) Times a Day.  12   • carvedilol (COREG) 12.5 MG tablet Take 1 tablet by mouth 2 (Two) Times a Day. 60 tablet 11   • cetirizine (zyrTEC) 5 MG tablet Take 1 tablet by mouth Daily As Needed for Allergies.     • cholecalciferol (VITAMIN D3) 25 MCG (1000 UT) tablet Take 1 tablet by mouth Daily.     • famotidine (PEPCID) 10 MG tablet Take 10 mg by mouth 2 (Two) Times a Day.     • ferrous sulfate 325 (65 FE) MG tablet Take 1 tablet by mouth Daily With Breakfast.     • furosemide (LASIX) 20 MG tablet Take 1 tablet by mouth Daily. 30 tablet 11   • gabapentin (NEURONTIN) 400 MG capsule Take 400 mg by mouth 2 (Two) Times a  Day.     • hydrALAZINE (APRESOLINE) 25 MG tablet Take 3 tablets by mouth Every 8 (Eight) Hours.     • insulin glargine (LANTUS, SEMGLEE) 100 UNIT/ML injection Inject 34 Units under the skin into the appropriate area as directed Every Night.  12   • insulin lispro (ADMELOG) 100 UNIT/ML injection Inject 10 Units under the skin into the appropriate area as directed 3 (Three) Times a Day With Meals.  12   • insulin lispro (humaLOG) 100 UNIT/ML injection Inject 0-10 Units under the skin into the appropriate area as directed 3 (Three) Times a Day Before Meals. 2 units for every 50 > 150     • levothyroxine (SYNTHROID, LEVOTHROID) 25 MCG tablet Take 1 tablet by mouth daily.     • LORazepam (ATIVAN) 0.5 MG tablet Take 1 tablet by mouth Every 8 (Eight) Hours As Needed for Anxiety. 10 tablet 0   • melatonin 1 MG tablet Take 1 tablet by mouth Every Night. 30 tablet    • methocarbamol (ROBAXIN) 750 MG tablet Take 1 tablet by mouth Every 6 (Six) Hours As Needed for Muscle Spasms.     • montelukast (SINGULAIR) 10 MG tablet Take 1 tablet by mouth Every Night.     • polyethylene glycol (MIRALAX) 17 GM/SCOOP powder Take 17 g by mouth Daily.     • predniSONE (DELTASONE) 5 MG tablet Take 3 tablets by mouth Daily With Breakfast.     • sodium bicarbonate 650 MG tablet Take 1 tablet by mouth 2 (Two) Times a Day. 60 tablet 0   • tamsulosin (FLOMAX) 0.4 MG capsule 24 hr capsule Take 0.4 mg by mouth every night at bedtime.     • eltrombopag (PROMACTA) 50 MG tablet Take 1 tablet by mouth Daily. Oncology is working on arranging this medication to be delivered to your house once you have been discharged from skilled facility.  Please start once arranged by oncology 30 tablet 5     No current facility-administered medications for this encounter.       Past Medical History:   Diagnosis Date   • Aortic valve stenosis     s/p tissue AVR   • Back pain    • CKD (chronic kidney disease)    • Colitis due to Clostridioides difficile 01/26/2022   •  Diastolic dysfunction     Grade 2 per echocardiogram 2013   • Diverticulosis    • Exertional shortness of breath    • Heart disease    • Hiatal hernia    • Hyperlipidemia    • Hypertension    • Hyperthyroidism    • Hypertriglyceridemia 05/31/2018   • Hypothyroidism    • Left ventricular hypertrophy    • Legally blind    • Liver disease    • Macular degeneration    • Mitral regurgitation    • Osteoarthritis of hip    • Pancreatitis 01/26/2022   • Paroxysmal atrial fibrillation (HCC)    • Premature ventricular contractions    • Pulmonary hypertension (HCC)    • Renal insufficiency syndrome    • Type 2 diabetes mellitus (HCC)    • Uterine cancer (HCC)        Past Surgical History:   Procedure Laterality Date   • AORTIC VALVE REPAIR/REPLACEMENT     • CATARACT EXTRACTION      1970, 1999   • ENDOSCOPY  08/15/2014    no gross lesions in stomach/duodenum, erythrematous mucosa in stomach   • HYSTERECTOMY  2007   • STERNOTOMY         Physical Exam  Vitals and nursing note reviewed.   Constitutional:       General: She is not in acute distress.     Appearance: She is well-developed. She is obese. She is not ill-appearing.   HENT:      Head: Normocephalic and atraumatic.   Eyes:      Conjunctiva/sclera: Conjunctivae normal.      Pupils: Pupils are equal, round, and reactive to light.   Neck:      Vascular: No JVD.   Cardiovascular:      Rate and Rhythm: Normal rate and regular rhythm.      Heart sounds: Normal heart sounds. No murmur heard.    No friction rub. No gallop.   Pulmonary:      Effort: Pulmonary effort is normal. No respiratory distress.      Breath sounds: Normal breath sounds.   Abdominal:      General: Bowel sounds are normal. There is no distension.      Palpations: Abdomen is soft.   Musculoskeletal:         General: Swelling present. No deformity.      Comments: Trace-1+ nonpitting BLE edema   Skin:     General: Skin is warm and dry.      Capillary Refill: Capillary refill takes less than 2 seconds.  "  Neurological:      Mental Status: She is alert and oriented to person, place, and time. Mental status is at baseline.   Psychiatric:         Mood and Affect: Mood normal.         Behavior: Behavior normal.          Review of Systems   Constitutional: Positive for fatigue. Negative for unexpected weight change.   HENT: Negative for congestion and nosebleeds.    Eyes: Negative for photophobia and visual disturbance.   Respiratory: Positive for cough and shortness of breath. Negative for chest tightness.    Cardiovascular: Positive for leg swelling. Negative for chest pain and palpitations.   Gastrointestinal: Negative for abdominal distention and blood in stool.   Endocrine: Negative for polyphagia and polyuria.   Genitourinary: Positive for frequency. Negative for urgency.   Musculoskeletal: Negative for joint swelling and myalgias.   Skin: Negative for pallor and rash.   Neurological: Positive for weakness. Negative for dizziness, syncope, light-headedness, numbness and headaches.   Hematological: Does not bruise/bleed easily.   Psychiatric/Behavioral: Positive for sleep disturbance. Negative for confusion.        OBJECTIVE:  /55   Pulse 59   Ht 144.8 cm (57.01\")   Wt 75.2 kg (165 lb 12.8 oz)   SpO2 97%   BMI 35.87 kg/m²      Body mass index is 35.87 kg/m².  Wt Readings from Last 1 Encounters:   09/06/22 75.2 kg (165 lb 12.8 oz)       Lab Review:  Renal Function: Estimated Creatinine Clearance: 24.1 mL/min (A) (by C-G formula based on SCr of 1.38 mg/dL (H)).    Lab Results   Component Value Date    PROBNP 2,472.0 (H) 08/24/2022       Results for orders placed during the hospital encounter of 07/30/22    Adult Transthoracic Echo Complete W/ Cont if Necessary Per Protocol    Interpretation Summary  · Left ventricular ejection fraction appears to be 61 - 65%. Left ventricular systolic function is normal.  · Left ventricular wall thickness is consistent with mild concentric hypertrophy.  · Left ventricular " diastolic function is consistent with (grade II w/high LAP) pseudonormalization.  · Normal right ventricular cavity size and systolic function noted.  · The left atrial cavity is moderately dilated.  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 The aortic valve peak and mean gradients are within defined limits. The prosthetic aortic valve is grossly normal.  · There is severe mitral annular calcification. The mitral valve leaflets are thickened. There are several calcified chordae  · Mild mitral valve regurgitation is present. No significant mitral valve stenosis is present.  · Mild to moderate tricuspid valve regurgitation is present.  · Calculated right ventricular systolic pressure from tricuspid regurgitation is 37 mmHg.  · There is no evidence of pericardial effusion      Procedures      6 MINUTE WALK                      Cardiac Procedures:  1. N/A       Previously trialed diuretics  Lasix      Previously trialed GDMT    Spironolactone  Losartan  Carvedilol     ASSESSMENT:     Diagnosis Plan   1. Chronic heart failure with preserved ejection fraction (HCC)     2. Essential hypertension     3. Hypertriglyceridemia     4. Pulmonary hypertension ([facility])     5. Paroxysmal atrial fibrillation ([facility])     6. Stage 3b chronic kidney disease ([facility])     7. Chronic ITP (idiopathic thrombocytopenia) ([facility])           PLAN OF CARE:  1.  HFpEF-NYHA class III.  Most recent ejection fraction 60% per echocardiogram.  Renal function precludes GDMT.    She has a slight bit of swelling in her lower extremities, but I do not think she is holding onto any significant amount of fluid.  I believe this is likely dependent in nature and is improving with her compression stockings.  And like to start an SGLT2 inhibitor today and have her follow-up in 2 weeks to see if we can reduce the dose of her loop diuretic.  She is to continue following a strict low-sodium diet of 2000 mg daily or less, fluid restriction of 1500 mL daily, as well as  remain adherent with daily weights.    Directions for when to call the clinic reviewed with the patient to include weight gain of 2 to 3 pounds in 24 hours, weight gain of 5 to 10 pounds within 7 days; worsening shortness of breath; worsening lower extremity edema or abdominal distention.    2.  Limited mobility-patient is wheelchair-bound.  This limits her activity level considerably.    3.  Hypertension-poorly controlled, transition to carvedilol as above.    4.  Hyperlipidemia- stable and controlled.     5.  Paroxysmal atrial fibrillation- rate and rhythm stable and controlled.  Currently on Lopressor.    6.  DM2- stable and well controlled on insulin.    7.  CKD 4-followed by nephrology.  Stable.        Start Farxiga 10 mg daily; follow-up in 2 weeks or sooner if needed        Advance Care Planning   Will discuss at subsequent visit      Thank you for allowing me to participate in the care of your patient,         Evi Fenton APRLEA  [facility] [place] HEART FAILURE  09/06/22  14:47 EDT      **Leisa Disclaimer:**  Much of this encounter note is an electronic transcription/translation of spoken language to printed text. The electronic translation of spoken language may permit erroneous, or at times, nonsensical words or phrases to be inadvertently transcribed. Although I have reviewed the note for such errors, some may still exist.

## 2022-09-06 NOTE — PROGRESS NOTES
"[facility] Heart Failure Clinic      Patient Name: Helena Carmen  :1931  Age: 89 y.o.  Sex: female  Referring Provider: Mango Arnold MD   Primary Cardiologist: Beth Butcher MD  Encounter Provider:  Carloz Cleveland PharmD      Chief Complaint:   Chief Complaint   Patient presents with   • Congestive Heart Failure       HPI:  89 YOF with PMH significant for HFpEF (EF 60%), CKD 4, aortic valve disease status post history of AVR with tissue valve, hyperlipidemia, hypertension, hiatal hernia, legally blind, paroxysmal atrial fibrillation, pulmonary hypertension, type 2 diabetes mellitus, history of uterine cancer. Patient was recently admitted from  to 8/10 for COVID-19 pneumonia and found to have small bilateral DVTs. At last visit, patient complained of shortness of air and blood pressure was elevated. Subsequently, the patient was transitioned from metoprolol to carvedilol. At clinic visit today, patient is stable from HF standpoint. She does have some petechiae on her lower extremities after scratching intensely.      Current Regimen:  Heart Failure  · Furosemide 20 mg daily    Other CV Meds  · Eliquis 5 mg PO Q12H  · Carvedilol 12.5 mg PO BID    Medication Use:  Adherence: good  Past hx of medication use/intolerance: none  Affordability: Traditional Medicare + [facility]  Income ~ $4200/month      Diet:  Not discussed at visit      Social History:  Tobacco use: Former smoker (Quit )  EtOH use: No  Illicit drug use: No  Exercise: Patient primarily lives stationary lifestyle; patient in wheelchair at visit      Immunization Status:  Pneumococcal: PCV13 (2016), PPSV23 (2020)  Influenza: Getting yearly  COVID-19: 7/15/2021, 2021, 2022      OBJECTIVE:    /55   Pulse 59   Ht 144.8 cm (57.01\")   Wt 75.2 kg (165 lb 12.8 oz)   SpO2 97%   BMI 35.87 kg/m²     Body mass index is 35.87 kg/m².  Wt Readings from Last 1 Encounters:   22 75.2 kg (165 lb 12.8 " oz)       Lab Review:  Renal Function: Estimated Creatinine Clearance: 24.1 mL/min (A) (by C-G formula based on SCr of 1.38 mg/dL (H)).    Lab Results   Component Value Date    PROBNP 2,472.0 (H) 08/24/2022       Results for orders placed during the hospital encounter of 07/30/22    Adult Transthoracic Echo Complete W/ Cont if Necessary Per Protocol    Interpretation Summary  · Left ventricular ejection fraction appears to be 61 - 65%. Left ventricular systolic function is normal.  · Left ventricular wall thickness is consistent with mild concentric hypertrophy.  · Left ventricular diastolic function is consistent with (grade II w/high LAP) pseudonormalization.  · Normal right ventricular cavity size and systolic function noted.  · The left atrial cavity is moderately dilated.  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 The aortic valve peak and mean gradients are within defined limits. The prosthetic aortic valve is grossly normal.  · There is severe mitral annular calcification. The mitral valve leaflets are thickened. There are several calcified chordae  · Mild mitral valve regurgitation is present. No significant mitral valve stenosis is present.  · Mild to moderate tricuspid valve regurgitation is present.  · Calculated right ventricular systolic pressure from tricuspid regurgitation is 37 mmHg.  · There is no evidence of pericardial effusion          ASSESSMENT/PLAN OF CARE:    HFpEF (EF 61-65%)  · Patient is currently on furosemide  · Patient could benefit from addition of an SGLT2i due to benefits in HFpEF  · Start Jardiance 10 mg PO daily   · Reviewed side effects/administration instructions with patient  · Sent 30 day supply to [facility]  · Instructed patient to call us if medication is unaffordable or not covered  · Patient would not qualify for PAP due to income  · Continue furosemide 20 mg PO daily  · Advised patient to call clinic with 2-3 lb weight gain in 24 hrs or >5 lb weight gain in 1  week     Hypertension  · Patient's metoprolol was changed to carvedilol at last visit due to uncontrolled hypertension  · At visit today, patient's BP is 146/55  · Patient tolerating well  · Continue carvedilol 12.5 mg PO BID    Thank you for allowing me to participate in the care of your patient,       Carloz Cleveland [facility] Failure [facility]  09/06/22  12:01 EDT

## 2022-09-09 ENCOUNTER — OFFICE VISIT (OUTPATIENT)
Dept: ONCOLOGY | Facility: CLINIC | Age: 87
End: 2022-09-09

## 2022-09-09 ENCOUNTER — SPECIALTY PHARMACY (OUTPATIENT)
Dept: ONCOLOGY | Facility: HOSPITAL | Age: 87
End: 2022-09-09

## 2022-09-09 ENCOUNTER — DOCUMENTATION (OUTPATIENT)
Dept: PHARMACY | Facility: HOSPITAL | Age: 87
End: 2022-09-09

## 2022-09-09 ENCOUNTER — APPOINTMENT (OUTPATIENT)
Dept: LAB | Facility: HOSPITAL | Age: 87
End: 2022-09-09

## 2022-09-09 VITALS
TEMPERATURE: 97.8 F | SYSTOLIC BLOOD PRESSURE: 145 MMHG | HEART RATE: 59 BPM | HEIGHT: 57 IN | DIASTOLIC BLOOD PRESSURE: 63 MMHG | RESPIRATION RATE: 18 BRPM | WEIGHT: 162 LBS | OXYGEN SATURATION: 95 % | BODY MASS INDEX: 34.95 KG/M2

## 2022-09-09 VITALS — BODY MASS INDEX: 35.05 KG/M2 | WEIGHT: 162 LBS

## 2022-09-09 DIAGNOSIS — D69.6 THROMBOCYTOPENIA: Primary | ICD-10-CM

## 2022-09-09 PROCEDURE — 99213 OFFICE O/P EST LOW 20 MIN: CPT | Performed by: NURSE PRACTITIONER

## 2022-09-09 RX ORDER — AMOXICILLIN 250 MG
1 CAPSULE ORAL DAILY
COMMUNITY
End: 2023-01-17 | Stop reason: ALTCHOICE

## 2022-09-09 NOTE — PROGRESS NOTES
TREATMENT  PREPARATION    Helena Carmen  [id number]  2/20/1931    Chief Complaint: Treatment preparation and needs assessment    History of present illness:  Helena Carmen is a 89 y.o. year old female who is here today for treatment preparation and needs assessment.  The patient has been diagnosed with   Encounter Diagnosis   Name Primary?   • Thrombocytopenia (HCC) Yes    and is scheduled to begin treatment with:     Oncology History:    Oncology/Hematology History    No history exists.       The current medication list and allergy list were reviewed and reconciled.     Past Medical History, Past Surgical History, Social History, Family History have been reviewed and are without significant changes except as mentioned.    Physical Exam:    Vitals:    09/09/22 1319   BP: 145/63   Pulse: 59   Resp: 18   Temp: 97.8 °F (36.6 °C)   SpO2: 95%     Vitals:    09/09/22 1319   PainSc:   8   PainLoc: Back  Comment: back and legs        ECOG score: 1         Physical Exam  Vitals reviewed.   HENT:      Head: Normocephalic.      Mouth/Throat:      Mouth: Mucous membranes are moist.      Pharynx: Oropharynx is clear.   Eyes:      Conjunctiva/sclera: Conjunctivae normal.      Pupils: Pupils are equal, round, and reactive to light.   Cardiovascular:      Rate and Rhythm: Normal rate.   Pulmonary:      Effort: Pulmonary effort is normal.   Skin:     General: Skin is warm and dry.      Findings: No rash.   Neurological:      General: No focal deficit present.      Mental Status: She is alert and oriented to person, place, and time.      Motor: Weakness (wheelchair) present.           NEEDS ASSESSMENTS    Genetics  The patient's new diagnosis and family history have been reviewed for genetic counseling needs. A genetic referral is not recommended.     Psychosocial and Barriers to care  The patient has completed a PHQ-9 Depression Screening and the Distress Thermometer (DT) today.  PHQ-9 results show PHQ-2 Total Score: 6 PHQ-9 Total  Score: PHQ-9 Total Score: 6     The patient scored their distress today as Distress Level: 6 on a scale of 0-10 with 0 being no distress and 10 being extreme distress. Problems marked by the patient as being an issue for them within the last week include Practical Problems  : No  Housing: No  Transportation: Yes  Treatment decisions: Yes  Family Concerns  Ability to have children: No .      Results were reviewed along with psychosocial resources offered by our cancer center.  Our Supportive Oncology team will be flagged for a score of 4 or above, and a same day call will be made for a score of 9 or 10.  A mental health referral is offered at that time. Patients who score less than 4 have been educated on our support services and can be referred to our  upon request.  The patient will not be referred to our .  She feels that her stress is related to currently living in an assisted living versus being at home.  She feels when she is able to return home her stress will greatly improve.  She also has good family support and is accompanied by her 2 daughters today.      Nutrition  The patient has completed the malnutrition screening today. They scored Malnutrition Screening Tool  Have you recently lost weight without trying?  If yes, how much weight have you lost?: 0--> No  Have you been eating poorly because of a decreased appetite?: 0--> No  MST score: 0    with a score of 0-1 meaning not at risk in a score of 2 or greater meaning at risk.  Patients with a score of 3 or higher will be referred to our oncology dietitian for support. Patients beginning at risk treatment regimens or who have dietary concerns will also be referred to our oncology dietitian. The patient will not be referred.    Functional Assessment  Persons who are age 70 or greater will be screened for qualification of a comprehensive geriatric assessment by our survivorship nurse practitioner.  Older adults with cancer  face unique challenges. These may include an increased risk of drug reactions, financial burdens, and caregiver stress. The patient scored G8 Questionnaire  Has food intake declined over the past 3 months due to loss of appetite, digestive problems, chewing or swallowing difficulties?: No decrease in food intake  Weight loss during the last 3 months: No weight loss  Mobility: Goes out  Neuropsychological Problems: No psychological problems  Body Mass Index (BMI (weight in kg) / (height in m2)): BMI 23 and > 23  Takes more than 3 medications a day: Yes, takes more than 3 prescription drugs per day  In comparison with other people of the same age, how does the patient consider his/her health status?: As good  Age: > 85  Total Score: 13 . Patients scoring 14 or lower will referred for an older adult functional assessment with the survivorship advanced practice registered nurse to ensure all needed support is provided as patients plan for their treatments. The patient will not be referred.     Intravenous Access Assessment  The patient and I discussed planned intravenous chemo/biotherapy as well as other IV treatments that are often needed throughout the course of treatment. These may include, but are not limited to blood transfusions, antibiotics, and IV hydration. Discussed that depending on selected treatment and vein assessment, patient may require venous access device (VAD) which could include but not limited to a Mediport or PICC line. Risks and benefits of VADs reviewed. The patient will be treated via PIV.    Reproductive/Sexual Activity   People should avoid becoming pregnant and should not get a partner pregnant while undergoing chemo/biotherapy.  People of childbearing age should use effective contraception during active therapy. The best recommendation for all people is to use a barrier method for a minimum of 1 week after the last infusion of chemo/biotherapy to prevent your partner being exposed to  "byproducts from treatment medications in bodily fluids. Effective contraception should be discussed with your oncology team to make sure it is safe to take based on your diagnosis. Possible options include oral contraceptives, barrier methods. Chemo/biotherapy can change your ability to reproduce children in the future.  There are options for fertility preservation. NOT APPLICABLE    Advanced Care Planning  Advance Care Planning   The patient and I discussed advanced care planning, \"Conversations that Matter\".   This service is offered for development of advance directives with a certified ACP facilitator.  The patient does have an up-to-date advanced directive. This document is on file with our office. The patient is not interested in an appointment with one of our facilitators to create or update their advanced directives.     Smoking cessation  Tobacco Use: Medium Risk   • Smoking Tobacco Use: Former Smoker   • Smokeless Tobacco Use: Never Used       Patient and I discussed their tobacco use history. Referral will not be made for smoking cessation.      Palliative Care  When appropriate, the patient and I discussed the availability palliative care services and when appropriate Hospice care. Palliative care is not the same as Hospice care which was explained to the patient.  The patient is not interested in additional information from our  on these services.     Survivorship   When appropriate, we discussed that we will refer the patient to survivorship clinic to discuss next steps following completion of planned treatment.  Reviewed this visit will include assessment of your physical, psychological, functional, and spiritual needs as a survivor and the need at attend this visit when scheduled.    Assessment and Plan:    Diagnoses and all orders for this visit:    1. Thrombocytopenia (HCC) (Primary)      No orders of the defined types were placed in this encounter.    1. Needs assessment was completed " where applicable including genetics, psychosocial needs, barriers to care, VAD evaluation, advanced care planning, survivorship, and palliative care services where indicated. Referrals have been ordered as appropriate based upon evaluation today and patient desires.   2. Adequate time was given to answer questions.  Patient made aware of their care team members and contact information if they have questions or problems throughout the treatment course.  3. Discussion held and written information provided describing frequency of office visits and ongoing monitoring throughout the treatment plan.     4. Reviewed with patient any prescribed medication sent to pharmacy.  Education provided regarding proper storage, safe handling, and proper disposal of unused medication.  5. Proper handling of body fluids and waste discussed and written information provided.  6. If appropriate, patient had pretreatment labs drawn today.    Learning assessment completed at initial patient encounter. See separate flowsheet.     I spent 25 minutes caring for Helena on this date of service. This time includes time spent by me in the following activities: preparing for the visit, reviewing tests, obtaining and/or reviewing a separately obtained history, performing a medically appropriate examination and/or evaluation, counseling and educating the patient/family/caregiver, documenting information in the medical record and care coordination.     Tessie Mcwilliams, RIGO   09/09/22

## 2022-09-09 NOTE — PROGRESS NOTES
[facility] Hematology/Oncology Treatment Plan Summary    Name: Helena Carmen  [facility]# [id number]  MD: Dr. Sherman    Diagnosis: Immune thrombocytopenia    Goal of chemotherapy: disease control    Treatment Medication(s) / Frequency and Dosing    1. Promacta 50my by mouth daily    Dr. Sherman will determine how long the patient is on the medication for and close monitoring of labs will be done.    Starting on: Care coordinator to set up delivery     Follow-up Testing to be determined after TBD cycles by MD.     Items for home use: Nothing at this time      Completing Pharmacist: Teresa Rios, Pharmacy Intern             Date/time: 09/09/2022 14:28 EDT    Please note: You will be seen by a provider frequently with your treatment plan. This plan may change depending on many factors, if so, this will be discussed with you by your physician.  Last update 12/2020.       Counseling Provided:  - Side effects reviewed with patient including: nausea/vomiting and changes in liver function. Additional side effects covered in written handout.   - Reviewed proper administration: Discussed if the patient is handling their own medications, then they need to wash their hands properly after touching the medication. If a caregiver is assisting with handling medication, need to wear disposal gloves and wash hands properly afterwards. Patient was counseled to take Promacta on an empty stomach, at the same time each day . Additional precautions: separate from dairy products by at least 2-4 hours  - Provided information on prior storage of medication: Store medication safe away from children and pets, away from light, and at room temperature. If you use a pill box, use a separate pill box from other medication.   - Provided information on safe handling of soiled linen and proper flush technique and reviewed proper disposal of medication.   - Reviewed what to do in the event of a missed dose: Do not take an extra dose or two doses  at one time. Simply take your next dose at the regularly scheduled time.  - Reviewed expected goals/outcomes, contraindications and safety precautions, including when to seek medical care.  - Provided information on pregnancy considerations - women should not become pregnant and men should not get a partner pregnant while taking; men and women of childbearing age and potential should use effective contraception during and after therapy.    Provided patient with:   Education sheets about the medication    Completed medication reconciliation today to assess for drug interactions.   Reviewed concomitant medications, allergies, labs, comorbidities/medical history, and immunization history.   Drug-drug interactions noted: She takes an iron supplement and I educated to separate that from Promacta by 2-4 hours  Advised pt to call the clinic if any new medications are started so we can assess for drug-drug interactions     Wrap-up:  Discussed aforementioned material with patient in person, face-to-face, in clinic.   Chemo consents/CCA were signed at today's visit.   Medication availability: [facility] them to arrange delivery  Patient and daughter and grand-daughter, present in today's appointment, expressed understanding.  Patient demonstrates ability to self-administer medication. No barriers to adherence identified.  All questions and concerns addressed.     Patient's family had additional questions that we forwarded along to Tessie (RIGO) since she was taking over for Dasia AYALA) today. Questions included doppler checks, home health doing her labs and faxing them, and tapering prednisone.    Teresa Rios, Pharmacy Intern  9/9/2022  14:28 EDT

## 2022-09-09 NOTE — PROGRESS NOTES
COVERING JOSE CARNES, CARE COORDINATOR:    Pharmacy Intern student Teresa Rios asked that I investigate Ms. Carmen promacta delivery.    The patient and her family were in the office today to receive a dual education about her treatment plan and informed Teresa that they hadn't received the prescription.    [facility] [phone], the prescription was delivered to the patient's address on 9/2/22 and placed on the front porch.     The [facility] tracking number is [id number].    I called Radha Hoyos, the patient's daughter, and left a message informing her of all of the above. I also provided [facility] telephone number and our office [facility]TM telephone number if she has any further questions.    Elizabeth Pascual - Care Coordinator   9/9/2022  16:11 EDT

## 2022-09-13 NOTE — PROGRESS NOTES
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  When she was last seen on 7/22/2022, she was receiving prednisone at 5 mg every other day in regards to her ITP with goal of maintaining platelet count above the 40-50,000 range.  Unfortunately she has had multiple issues to occur since that time.  She was hospitalized 7/30 - 8/2/2022 due to COVID-19 infection/pneumonia.  She received dexamethasone alone.  During that time, platelet count was 46,000 on admission 7/30/2022 but increased up to 82,000 the time of discharge on 8/2/2022.  She was hospitalized again 8/5- 8/10/2022 due to symptoms of persistent weakness, fatigue, shortness of breath, cough, congestion following her COVID-19 infection.  During that hospitalization, she underwent CT chest 8/7/2022 which showed no significant abnormal findings.  Bilateral lower extremity Doppler showed bilateral acute calf DVT.  Perfusion scan 8/8/2022 showed no evidence of pulmonary embolism.  Platelet count was in the 90-low 100,000 range and she was therefore anticoagulated initially with Lovenox and transitioned to Eliquis.  She had transitioned from dexamethasone which she received for COVID-19 infection back to prednisone at 5 mg every other day.  Platelet count subsequently decreased into the 60-97125 range.  On 8/19/2022 prednisone increased to 10 mg daily.  With no improvement in platelet count on 8/22/2022, prednisone dose increased to 15 mg daily.  Patient was admitted yet again 8/24 - 8/29/2022 related to L5 compression fracture.  CT scans showed compression fracture at L5.  MRI lumbar spine 8/24/2022 redemonstrated L5 compression fracture.  Conservative management of compression fracture was recommended by neurosurgery.  With concern for ongoing prednisone use  "discussion regarding use of Promacta 50 mg daily with potential prednisone taper pending response.    Patient subsequently transferred to subacute nursing facility.  Per family did obtain Promacta 50 mg daily and provided this to the nursing facility and she began the medication on 9/10/2022.  She has continued on Eliquis 5 mg twice daily.  She continues on GI prophylaxis with Pepcid 10 mg twice daily.  She has also been continuing on oral iron daily.  She is taking pain medication with hydrocodone 7.5/325.  She is experiencing significant constipation which is helped with MiraLAX daily.  She is accompanied by family members today in the office who are very involved in her care.  Patient is in a wheelchair today, reports ongoing difficulty with back pain from her compression fracture.  She is scheduled to see neurosurgery tomorrow.      Objective   Vital Signs:   /55   Pulse 61   Temp 97.9 °F (36.6 °C) (Temporal)   Resp 18   Ht 144.8 cm (57.01\")   Wt 71.7 kg (158 lb)   SpO2 96%   BMI 34.18 kg/m²     Physical Exam  Constitutional:       Appearance: She is well-developed.      Comments: Elderly female no distress seated in wheelchair   Eyes:      Conjunctiva/sclera: Conjunctivae normal.   Neck:      Thyroid: No thyromegaly.   Cardiovascular:      Rate and Rhythm: Normal rate and regular rhythm.      Heart sounds: Murmur heard.     No friction rub. No gallop.   Pulmonary:      Effort: No respiratory distress.      Breath sounds: Normal breath sounds.   Chest:   Breasts:      Right: No supraclavicular adenopathy.      Left: No supraclavicular adenopathy.       Abdominal:      General: Bowel sounds are normal. There is no distension.      Palpations: Abdomen is soft.      Tenderness: There is no abdominal tenderness.   Musculoskeletal:         General: Swelling present.      Comments: 1+ bilateral lower extremity edema   Lymphadenopathy:      Head:      Right side of head: No submandibular adenopathy.      " Cervical: No cervical adenopathy.      Upper Body:      Right upper body: No supraclavicular adenopathy.      Left upper body: No supraclavicular adenopathy.   Skin:     General: Skin is warm and dry.      Findings: No rash.      Comments: Scattered areas of purpura involving the bilateral forearms and bilateral distal lower extremities   Neurological:      Mental Status: She is alert and oriented to person, place, and time.      Cranial Nerves: No cranial nerve deficit.      Motor: No abnormal muscle tone.      Deep Tendon Reflexes: Reflexes normal.   Psychiatric:         Behavior: Behavior normal.        Result Review : Reviewed CBC, CMP, IPF from today.  Reviewed multiple hospital records from 3 separate admissions including laboratory studies, scan results, discharge summaries, progress notes       Assessment and Plan     *Thrombocytopenia with suspected ITP, question contribution from hypersplenism related to chronic liver disease (spleen normal in size however):  · Patient with apparent longstanding history of thrombocytopenia  · Previous CT scan with suggestion of chronic liver disease/cirrhotic liver morphology, last CT 4/15/2019 with normal spleen  · Platelet count 3/22/2019 was 52,000 and on 1/26/2022 was 60,000  · On admission 3/2/2022, platelet count 39,000  · Additional labs 3/3/2022 with IPF elevated at 12.2% indicative of peripheral destructive process and has remained elevated in the 10-12% range.  · Additional labs 3/3/2022 with positive BECCA at 1: 320 dilution with homogeneous pattern, negative BECCA panel  · On 3/4/2022, B12 low normal at 238, folate greater than 20, negative serum protein electrophoresis and immunoelectrophoresis with free kappa light chain 65.6, free lambda light chain 37.1 and free light chain ratio 1.77 (appropriate for degree of renal dysfunction), LDH borderline elevated 238  · CT abdomen and pelvis 3/8/2022 with liver morphology suspicious for chronic liver disease, spleen  normal in size at 10.4 cm.  No other abnormalities.  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    · Unclear whether  improvement in thrombocytopenia was related to steroids, B12 replacement, or improvement in CHF/volume overload.  · Recommended decreasing prednisone dose down to 15 mg daily prior to transition to subacute nursing facility on 3/9/2022.  Patient however was discharged on prednisone 20 mg daily.  Requested that nursing facility forward labs weekly monitor platelet count and further gradually taper prednisone dose however this did not occur.  · On 3/23/2022, platelet count was 64,000.  Tapered prednisone from 20 down to 15 mg daily.  Intend to maintain platelet count above the 50-49878 range.  Consider additional treatment options with IVIG or rituximab if platelet count declines into the 30,000 range or below consistently.  · 3/31/2022 platelet count 65,000 and on 4/4/2022 prednisone was tapered down to 10 mg daily  · On 4/20/2022, platelet count of 96,000 and prednisone tapered down to 5 mg daily.  · Platelet count on 6/15/2022 92,000 with subsequent taper of prednisone to 5 mg every other day  · Platelet count did decline into the 40-50781 range on prednisone 5 mg every other day.  · Patient hospitalized 7/30 - 8/2/2022 due to COVID-19 infection/pneumonia.  She received dexamethasone alone.  During that time, platelet count was 46,000 on admission 7/30/2022 but increased up to 82,000 the time of discharge on 8/2/2022.    · Hospitalized again 8/5- 8/10/2022 due to symptoms of persistent weakness, fatigue, shortness of breath, cough, congestion following her COVID-19 infection. Bilateral lower extremity Doppler showed bilateral acute calf DVT.  Perfusion scan 8/8/2022 showed no evidence of pulmonary embolism.  Platelet count was in the 90-low 100,000 range and she was  therefore anticoagulated initially with Lovenox and transitioned to Eliquis.  Transitioned from dexamethasone which she received for COVID-19 infection back to prednisone at 5 mg every other day.    · Platelet count subsequently decreased into the 60-46668 range.  On 8/19/2022 prednisone increased to 10 mg daily.    · With no improvement in platelet count on 8/22/2022, prednisone dose increased to 15 mg daily.    · Patient was admitted yet again 8/24 - 8/29/2022 related to L5 compression fracture. With concern for ongoing prednisone in the setting of compression fracture, discussion regarding use of Promacta 50 mg daily with potential prednisone taper pending response.  · Patient subsequently transferred to subacute nursing facility.  Per family, obtained Promacta 50 mg daily and provided this to the nursing facility and she began the medication on 9/10/2022.   · Patient returns today in follow-up continuing on prednisone 15 mg daily and recently started on Promacta 50 mg daily 9/10/2022 for presumed component of ITP.  It remains unclear whether there is any degree of hypersplenism related to chronic liver disease contributing to her thrombocytopenia as well.  She does appear to respond to steroids at higher dosing.  Platelet count today is 98,000, had been in the 60-70 range more recently.  Her count had already increased up to 108,000 on 8/29/2022 in response to the higher dose of prednisone prior to initiating Promacta.  There is some risk to Promacta in this situation, particularly if the patient required coming off of anticoagulation at any point with risk of thrombosis in the setting of patient's current lateral calf DVT.  There is also some concern regarding possible underlying chronic liver disease and potential to increase LFTs although patient's LFTs currently remain normal.  We also do not want to reduce an unnecessary medication to the patient's regimen at this time given her multitude of recent medical  issues with potential for additional side effects.  She is currently maintaining platelet count in acceptable range.  We did discuss that prednisone is not producing any acute effects in terms of further risk for osteoporotic compression fractures.  Unclear whether she had pre-existing osteoporosis, has been on steroids for only approximately 6 months at varying dosing.  There is no immediate need to transition off of prednisone for now.  She appears to be responding to steroids.  We will attempt to decrease prednisone to 10 mg daily.  We will discontinue Promacta for now.  When she is further out from her thrombosis we could possibly reconsider initiation of Promacta, possibly even at a lower dose initially of 25 mg daily.  We will check weekly counts in the nursing facility to be faxed to our office and when she goes home we will transition to counts being drawn weekly by home health and being forwarded to our office for review.  I will see her in 5 to 6 weeks with repeat labs.    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  Recent labs during hospitalization were again consistent with anemia secondary to chronic disease on 8/26/2022 with iron 36, ferritin 241, iron saturation 13%, TIBC 276, folate 6.69, B12 417.  She is continuing on oral iron daily.  She is not iron deficient.  She is experiencing significant constipation related to narcotics as well as iron.  We will discontinue her oral iron and will communicate this to the nursing facility.  We will monitor her  hemoglobin.    *Borderline B12 deficiency  · B12 level on 3/4/2022 was 238  · Patient initiated B12 1000 mcg IM injection daily x3 days and oral B12 1000 mcg daily  · Labs on 4/20/2022 with B12 811  · Patient was briefly receiving oral B12 replacement, not currently receiving B12.  · B12 level on 8/27/2022 was 417.     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  · Baseline creatinine 1.5-2  · Creatinine on admission 3/2/2022 was 2.79, subsequently improved  · Patient continues routine follow-up with nephrology  · Creatinine today 1.78     *Acute on chronic diastolic CHF  · History of severe aortic stenosis status post aortic valve replacement in June 2013  · Patient admitted with progressive generalized weakness and dyspnea on exertion  · Patient receiving diuretics currently with Lasix 20 mg daily     *Diabetes mellitus type 2  · Exacerbated by steroids, subsequently improved with steroid taper     *Possible chronic liver disease  · Prior CTs with notation of cirrhotic liver morphology  · CT abdomen pelvis 4/15/2019 showed liver with a mildly shrunken appearance concerning for chronic liver disease.  Spleen however was normal in size.  · LFTs normal on 3/2/2022  · CT abdomen and pelvis 3/8/2022 with liver morphology suspicious for chronic liver disease, spleen normal in size at 10.4 cm.  No other abnormalities.  · Significance of the liver appearance on scans is unclear.  · On 3/23/2022, elevated LFTs with ALT 46, AST 40, normal total bilirubin 0.4.  · LFTs today remain normal    *GI prophylaxis  · Patient is currently receiving Pepcid 10 mg twice daily    *Pulmonary nodule  · Patient underwent CT scan at [facility] on 9/7/2021 that showed a 4 mm subpleural left lower lobe nodule  · Patient was scheduled for follow-up CT chest with thoracic surgery however declined to proceed with the scan.  Given her age and limited ability to treat a malignancy and with a high probability that this is a benign finding, scan was canceled.  · CT chest during  hospitalization 8/7/2022 with no mention of pulmonary nodule.    *Bilateral calf DVT  · Patient was hospitalized 7/30 - 8/2/2022 due to COVID-19 infection/pneumonia.  She received dexamethasone alone.    · She was hospitalized again 8/5- 8/10/2022 due to symptoms of persistent weakness, fatigue, shortness of breath, cough, congestion following her COVID-19 infection.  During that hospitalization, she underwent CT chest 8/7/2022 which showed no significant abnormal findings.  Bilateral lower extremity Doppler showed bilateral acute calf DVT.  Perfusion scan 8/8/2022 showed no evidence of pulmonary embolism.  Platelet count was in the 90-low 100,000 range and she was therefore anticoagulated initially with Lovenox and transitioned to Eliquis.   · Patient returns today continuing on anticoagulation with Eliquis 5 mg twice daily.  She is not experiencing any significant bleeding issues.  Platelet count is acceptable to continue anticoagulation for now.  We will need to consider possibly holding Eliquis if her platelet count declines below the 60-32751 range or if any bleeding occurs.    *L5 compression fracture  · Patient hospitalized 8/24 - 8/29/2022 related to L5 compression fracture.    · CT scans showed compression fracture at L5.    · MRI lumbar spine 8/24/2022 redemonstrated L5 compression fracture.    · Conservative management of compression fracture was recommended by neurosurgery.  · Patient with ongoing pain related to compression fracture.  She is receiving hydrocodone 7.5/325 as needed.  She is seeing neurosurgery tomorrow in follow-up.  With ongoing pain, defer to neurosurgery regarding possibility of kyphoplasty.  Would be reluctant to take patient off of Eliquis for prolonged period of time this close in time frame to her DVT.    *Constipation  · Secondary to narcotics and likely also on oral iron  · Patient is receiving MiraLAX daily  · As above patient is not iron deficient and we will discontinue oral  iron.       Plan:  1. We will contact nursing facility today regarding medication changes listed below  2. Discontinue oral iron.  Patient has not iron deficient, has anemia secondary to chronic disease.  3. Discontinue Promacta for now (may further increase thrombotic risk, concern regarding possible underlying chronic liver disease, potential for additional side effects from the medication)  4. Decrease prednisone to 10 mg daily  5. Patient continuing currently on Eliquis 5 mg twice daily  6. Continue GI prophylaxis with Pepcid 10 mg twice daily  7. Weekly CBC to be performed at the nursing facility and subsequently by home health once she is discharged to be faxed to our office for review of platelet count with ongoing anticoagulation.  8. Patient scheduled to follow-up with neurosurgery tomorrow regarding L5 compression fracture with persistent pain  9. Return visit in 5 to 6 weeks with CBC, CMP, IPF.    I did spend 50 minutes in total time caring for the patient today, time spent in review of records, face-to-face consultation, coordination of care, placement of orders, completion of documentation.

## 2022-09-14 ENCOUNTER — TELEPHONE (OUTPATIENT)
Dept: ONCOLOGY | Facility: CLINIC | Age: 87
End: 2022-09-14

## 2022-09-14 ENCOUNTER — LAB (OUTPATIENT)
Dept: LAB | Facility: HOSPITAL | Age: 87
End: 2022-09-14

## 2022-09-14 ENCOUNTER — OFFICE VISIT (OUTPATIENT)
Dept: ONCOLOGY | Facility: CLINIC | Age: 87
End: 2022-09-14

## 2022-09-14 VITALS
BODY MASS INDEX: 34.09 KG/M2 | OXYGEN SATURATION: 96 % | WEIGHT: 158 LBS | RESPIRATION RATE: 18 BRPM | SYSTOLIC BLOOD PRESSURE: 136 MMHG | DIASTOLIC BLOOD PRESSURE: 55 MMHG | TEMPERATURE: 97.9 F | HEIGHT: 57 IN | HEART RATE: 61 BPM

## 2022-09-14 DIAGNOSIS — D69.6 THROMBOCYTOPENIA: Primary | ICD-10-CM

## 2022-09-14 DIAGNOSIS — D69.6 THROMBOCYTOPENIA: ICD-10-CM

## 2022-09-14 PROBLEM — Z95.2 S/P AVR: Status: RESOLVED | Noted: 2017-05-10 | Resolved: 2022-09-14

## 2022-09-14 LAB
ALBUMIN SERPL-MCNC: 3.8 G/DL (ref 3.5–5.2)
ALBUMIN/GLOB SERPL: 1.3 G/DL (ref 1.1–2.4)
ALP SERPL-CCNC: 80 U/L (ref 38–116)
ALT SERPL W P-5'-P-CCNC: 17 U/L (ref 0–33)
ANION GAP SERPL CALCULATED.3IONS-SCNC: 16.8 MMOL/L (ref 5–15)
AST SERPL-CCNC: 21 U/L (ref 0–32)
BASOPHILS # BLD AUTO: 0.01 10*3/MM3 (ref 0–0.2)
BASOPHILS NFR BLD AUTO: 0.1 % (ref 0–1.5)
BILIRUB SERPL-MCNC: 0.6 MG/DL (ref 0.2–1.2)
BUN SERPL-MCNC: 38 MG/DL (ref 6–20)
BUN/CREAT SERPL: 21.3 (ref 7.3–30)
CALCIUM SPEC-SCNC: 9.4 MG/DL (ref 8.5–10.2)
CHLORIDE SERPL-SCNC: 103 MMOL/L (ref 98–107)
CO2 SERPL-SCNC: 23.2 MMOL/L (ref 22–29)
CREAT SERPL-MCNC: 1.78 MG/DL (ref 0.6–1.1)
DEPRECATED RDW RBC AUTO: 57.3 FL (ref 37–54)
EGFRCR SERPLBLD CKD-EPI 2021: 26.7 ML/MIN/1.73
EOSINOPHIL # BLD AUTO: 0.01 10*3/MM3 (ref 0–0.4)
EOSINOPHIL NFR BLD AUTO: 0.1 % (ref 0.3–6.2)
ERYTHROCYTE [DISTWIDTH] IN BLOOD BY AUTOMATED COUNT: 16.9 % (ref 12.3–15.4)
GLOBULIN UR ELPH-MCNC: 2.9 GM/DL (ref 1.8–3.5)
GLUCOSE SERPL-MCNC: 247 MG/DL (ref 74–124)
HCT VFR BLD AUTO: 35.7 % (ref 34–46.6)
HGB BLD-MCNC: 10.7 G/DL (ref 12–15.9)
IMM GRANULOCYTES # BLD AUTO: 0.06 10*3/MM3 (ref 0–0.05)
IMM GRANULOCYTES NFR BLD AUTO: 0.6 % (ref 0–0.5)
LYMPHOCYTES # BLD AUTO: 0.74 10*3/MM3 (ref 0.7–3.1)
LYMPHOCYTES NFR BLD AUTO: 7.7 % (ref 19.6–45.3)
MCH RBC QN AUTO: 28.6 PG (ref 26.6–33)
MCHC RBC AUTO-ENTMCNC: 30 G/DL (ref 31.5–35.7)
MCV RBC AUTO: 95.5 FL (ref 79–97)
MONOCYTES # BLD AUTO: 0.12 10*3/MM3 (ref 0.1–0.9)
MONOCYTES NFR BLD AUTO: 1.3 % (ref 5–12)
NEUTROPHILS NFR BLD AUTO: 8.61 10*3/MM3 (ref 1.7–7)
NEUTROPHILS NFR BLD AUTO: 90.2 % (ref 42.7–76)
NRBC BLD AUTO-RTO: 0 /100 WBC (ref 0–0.2)
PLATELET # BLD AUTO: 98 10*3/MM3 (ref 140–450)
PLATELETS.RETICULATED NFR BLD AUTO: 8.7 % (ref 0.9–6.5)
PMV BLD AUTO: 11 FL (ref 6–12)
POTASSIUM SERPL-SCNC: 4.7 MMOL/L (ref 3.5–4.7)
PROT SERPL-MCNC: 6.7 G/DL (ref 6.3–8)
RBC # BLD AUTO: 3.74 10*6/MM3 (ref 3.77–5.28)
SODIUM SERPL-SCNC: 143 MMOL/L (ref 134–145)
WBC NRBC COR # BLD: 9.55 10*3/MM3 (ref 3.4–10.8)

## 2022-09-14 PROCEDURE — 36415 COLL VENOUS BLD VENIPUNCTURE: CPT

## 2022-09-14 PROCEDURE — 80053 COMPREHEN METABOLIC PANEL: CPT

## 2022-09-14 PROCEDURE — 99215 OFFICE O/P EST HI 40 MIN: CPT | Performed by: INTERNAL MEDICINE

## 2022-09-14 PROCEDURE — 85055 RETICULATED PLATELET ASSAY: CPT

## 2022-09-14 PROCEDURE — 85025 COMPLETE CBC W/AUTO DIFF WBC: CPT

## 2022-09-14 NOTE — TELEPHONE ENCOUNTER
Called patient's nursing facility, [facility], spoke with patient's nurse Aubrey. Gave verbal orders per Dr. Sherman to: discontinue oral iron; discontinue Promacta and return medication to patient's family; decrease prednisone to 10 mg daily; draw weekly CBC (starting next week) and fax results to our office. Aubrey r/v orders.

## 2022-09-15 ENCOUNTER — OFFICE VISIT (OUTPATIENT)
Dept: NEUROSURGERY | Facility: CLINIC | Age: 87
End: 2022-09-15

## 2022-09-15 ENCOUNTER — HOSPITAL ENCOUNTER (OUTPATIENT)
Dept: GENERAL RADIOLOGY | Facility: HOSPITAL | Age: 87
Discharge: HOME | End: 2022-09-15
Admitting: NURSE PRACTITIONER

## 2022-09-15 ENCOUNTER — TELEPHONE (OUTPATIENT)
Dept: NEUROSURGERY | Facility: CLINIC | Age: 87
End: 2022-09-15

## 2022-09-15 VITALS
RESPIRATION RATE: 18 BRPM | WEIGHT: 158 LBS | SYSTOLIC BLOOD PRESSURE: 116 MMHG | DIASTOLIC BLOOD PRESSURE: 62 MMHG | HEART RATE: 59 BPM | OXYGEN SATURATION: 98 % | BODY MASS INDEX: 34.09 KG/M2 | HEIGHT: 57 IN

## 2022-09-15 DIAGNOSIS — S32.051A: ICD-10-CM

## 2022-09-15 DIAGNOSIS — S32.059A CLOSED FRACTURE OF FIFTH LUMBAR VERTEBRA, UNSPECIFIED FRACTURE MORPHOLOGY, INITIAL ENCOUNTER: Primary | ICD-10-CM

## 2022-09-15 PROCEDURE — 72100 X-RAY EXAM L-S SPINE 2/3 VWS: CPT

## 2022-09-15 PROCEDURE — 99213 OFFICE O/P EST LOW 20 MIN: CPT | Performed by: NEUROLOGICAL SURGERY

## 2022-09-15 RX ORDER — UREA 10 %
LOTION (ML) TOPICAL
COMMUNITY
End: 2022-12-25 | Stop reason: HOSPADM

## 2022-09-15 RX ORDER — ATORVASTATIN CALCIUM 20 MG/1
20 TABLET, FILM COATED ORAL DAILY
COMMUNITY
End: 2022-09-27

## 2022-09-15 RX ORDER — HYDROCODONE BITARTRATE AND ACETAMINOPHEN 10; 325 MG/1; MG/1
1 TABLET ORAL EVERY 4 HOURS PRN
COMMUNITY
End: 2022-10-20 | Stop reason: HOSPADM

## 2022-09-15 RX ORDER — PNV NO.95/FERROUS FUM/FOLIC AC 28MG-0.8MG
TABLET ORAL
COMMUNITY
End: 2022-09-27 | Stop reason: ALTCHOICE

## 2022-09-15 NOTE — PROGRESS NOTES
Patient ID: Helena Carmen is a 89 y.o. female is an established patient with L5 compression fx. Patient reports pain medication not working. Patient has increased pain for about a week.  The intensity comes and goes.     Subjective:     History of Present Illness  Deepika has continued to have intermittent and worsening pain in her back after surgery due to the fracture.  She is currently taking prednisone due to ITP which is tough because she already has osteopenia and is trying to heal a fracture.  She has been taking gabapentin and Norco with some success but the pain she currently has is preventing her from ambulating and she has been wheelchair-bound for now.  She also notes some pain radiating down the back of her legs but this is intermittent.      While in the room and during my examination of the patient I wore a mask and eye protection.  I washed my hands before and after this patient encounter.  The patient was also wearing a mask.    The following portions of the patient's history were reviewed and updated as appropriate: allergies, current medications, past family history, past medical history, past social history, past surgical history and problem list.     Objective:    Vitals:    09/15/22 1143   BP: 116/62   Pulse: 59   Resp: 18   SpO2: 98%     Body mass index is 34.19 kg/m².    Physical Exam  Constitutional:       General: She is in acute distress.      Appearance: Normal appearance.   HENT:      Head: Normocephalic and atraumatic.   Eyes:      Extraocular Movements: Extraocular movements intact.      Conjunctiva/sclera: Conjunctivae normal.      Pupils: Pupils are equal, round, and reactive to light.   Cardiovascular:      Rate and Rhythm: Normal rate and regular rhythm.      Pulses: Normal pulses.   Pulmonary:      Breath sounds: Normal breath sounds.   Abdominal:      Palpations: Abdomen is soft.   Musculoskeletal:         General: Normal range of motion.      Cervical back: Normal range of motion  and neck supple.      Lumbar back: Tenderness present.   Skin:     General: Skin is warm and dry.   Neurological:      Mental Status: She is alert and oriented to person, place, and time.      Cranial Nerves: Cranial nerves are intact.      Motor: Motor function is intact. No weakness or atrophy.      Coordination: Coordination is intact. Romberg sign negative. Romberg Test normal.      Gait: Gait is intact. Gait normal.      Deep Tendon Reflexes: Reflexes are normal and symmetric.      Reflex Scores:       Tricep reflexes are 2+ on the right side and 2+ on the left side.       Bicep reflexes are 2+ on the right side and 2+ on the left side.       Brachioradialis reflexes are 2+ on the right side and 2+ on the left side.       Patellar reflexes are 2+ on the right side and 2+ on the left side.       Achilles reflexes are 2+ on the right side and 2+ on the left side.  Psychiatric:         Speech: Speech normal.       Neurologic Exam     Mental Status   Oriented to person, place, and time.   Attention: normal. Concentration: normal.   Speech: speech is normal   Level of consciousness: alert    Cranial Nerves   Cranial nerves II through XII intact.     CN III, IV, VI   Pupils are equal, round, and reactive to light.    Motor Exam   Muscle bulk: normal  Overall muscle tone: normal    Strength   Strength 5/5 except as noted.     Sensory Exam   Light touch normal.     Gait, Coordination, and Reflexes     Gait  Gait: normal    Coordination   Romberg: negative    Reflexes   Reflexes 2+ except as noted.   Right brachioradialis: 2+  Left brachioradialis: 2+  Right biceps: 2+  Left biceps: 2+  Right triceps: 2+  Left triceps: 2+  Right patellar: 2+  Left patellar: 2+  Right achilles: 2+  Left achilles: 2+       Results: X-ray of the lumbar spine was reviewed and shows relatively stable appearing L5 fracture although the parallax is slightly off.  No other new fractures are noted on this x-ray.    Assessment/Plan: I think that  we need to get her a brace for pain control purposes and we will contact our brace representative to get her fitted for one.  In the meantime, she should continue to pursue conservative management for back pain.  I do not think she is a candidate for kyphoplasty or any sort of surgical procedure to address this fracture.       Diagnoses and all orders for this visit:    1. Closed fracture of fifth lumbar vertebra, unspecified fracture morphology, initial encounter ([facility]) (Primary)       Follow-up in 6 weeks with x-ray  TLSO brace           Casey Erickson MD  09/15/22  12:57 EDT

## 2022-09-15 NOTE — TELEPHONE ENCOUNTER
Called [facility] for the TLSO brace for PT.  Will send over the documentation for her to call PT and set up the appt for the brace.

## 2022-09-20 ENCOUNTER — HOSPITAL ENCOUNTER (OUTPATIENT)
Dept: CARDIOLOGY | Facility: HOSPITAL | Age: 87
Discharge: HOME | End: 2022-09-20
Admitting: NURSE PRACTITIONER

## 2022-09-20 VITALS
WEIGHT: 158 LBS | OXYGEN SATURATION: 96 % | HEART RATE: 59 BPM | DIASTOLIC BLOOD PRESSURE: 51 MMHG | SYSTOLIC BLOOD PRESSURE: 141 MMHG | BODY MASS INDEX: 34.09 KG/M2 | HEIGHT: 57 IN

## 2022-09-20 DIAGNOSIS — I48.0 PAROXYSMAL ATRIAL FIBRILLATION: ICD-10-CM

## 2022-09-20 DIAGNOSIS — I50.32 CHRONIC HEART FAILURE WITH PRESERVED EJECTION FRACTION: Primary | ICD-10-CM

## 2022-09-20 DIAGNOSIS — Z79.4 TYPE 2 DIABETES MELLITUS WITH HYPERGLYCEMIA, WITH LONG-TERM CURRENT USE OF INSULIN: ICD-10-CM

## 2022-09-20 DIAGNOSIS — I10 ESSENTIAL HYPERTENSION: ICD-10-CM

## 2022-09-20 DIAGNOSIS — I27.20 PULMONARY HYPERTENSION: ICD-10-CM

## 2022-09-20 DIAGNOSIS — N18.32 STAGE 3B CHRONIC KIDNEY DISEASE: ICD-10-CM

## 2022-09-20 DIAGNOSIS — E11.65 TYPE 2 DIABETES MELLITUS WITH HYPERGLYCEMIA, WITH LONG-TERM CURRENT USE OF INSULIN: ICD-10-CM

## 2022-09-20 PROCEDURE — G0463 HOSPITAL OUTPT CLINIC VISIT: HCPCS

## 2022-09-20 PROCEDURE — 99214 OFFICE O/P EST MOD 30 MIN: CPT | Performed by: NURSE PRACTITIONER

## 2022-09-20 NOTE — PROGRESS NOTES
[facility] HEART FAILURE      Patient Name: Helena Carmen  :[id number]  Age: 89 y.o.  Sex: female  Referring Provider: Beth Butcher MD   Primary Cardiologist: Beth Butcher MD  Encounter Provider:  RIGO Lowe      Chief Complaint:   Chief Complaint   Patient presents with   • Congestive Heart Failure         Congestive Heart Failure  Associated symptoms include fatigue. Pertinent negatives include no chest pain, palpitations, shortness of breath or unexpected weight change.    this 89-year-old female, new to this provider, comes today for further evaluation regarding her chronic diastolic heart failure.  Current diagnoses to include CKD 4, aortic valve disease status post history of AVR with tissue valve, hyperlipidemia, hypertension, hiatal hernia, legally blind, paroxysmal atrial fibrillation, pulmonary hypertension, type 2 diabetes mellitus, history of uterine cancer.    Her history of diastolic dysfunction goes back as far as at least .  Repeat echocardiogram 2019 revealed LVEF of 56% with grade 2 LV diastolic dysfunction, RVSP of 69 mmHg, trivial pericardial effusion noted at that time thus Lasix was started.    It is noted that during appointment with RIGO late 2022 that she had been off of spironolactone for 1 to 2 weeks and with that had increased bilateral lower extremity edema as well as abdominal distention.  She received 1 dose of IV Lasix in clinic on 2022 and oral Lasix was restarted at 40 mg twice daily.  Nephrology, of note, stopped losartan, increased hydralazine, and decreased Lasix on 2022.    Admission from 3/2/2022 to 3/9/2022 was noted with acute on chronic exacerbation of her diastolic heart failure and acute kidney injury.  She has since been treated with Lasix 40 mg every other day and daily spironolactone per nephrology recommendation.    She was rehospitalized in 2022 and has since been staying at [facility].  She was  readmitted again 8/5-8/10/2022 with COVID 19 pneumonia and found to have small bilateral DVTs.  She was treated with lovenox and transitioned to Eliquis. During admission she was found to be hypertensive.     Carvedilol was transitioned to metoprolol.    Unfortunately, she developed a urinary tract infection on Jardiance.  She is currently being treated for this.  Otherwise, she is feeling quite well from a heart failure standpoint and is beginning to gain some strength back in rehab.      The following portions of the patient's history were reviewed and updated as appropriate: allergies, current medications, past family history, past medical history, past social history, past surgical history and problem list.    Current Outpatient Medications   Medication Sig Dispense Refill   • albuterol (PROVENTIL) (2.5 MG/3ML) 0.083% nebulizer solution Take 2.5 mg by nebulization Every 6 (Six) Hours As Needed for Shortness of Air.  12   • amLODIPine (NORVASC) 10 MG tablet Take 1 tablet by mouth daily.     • apixaban (ELIQUIS) 5 MG tablet tablet Take 5 mg by mouth 2 (Two) Times a Day.     • ascorbic acid (VITAMIN C) 500 MG tablet Take 1 tablet by mouth Daily.     • atorvastatin (LIPITOR) 20 MG tablet Take 20 mg by mouth Daily.     • budesonide-formoterol (SYMBICORT) 160-4.5 MCG/ACT inhaler Inhale 2 puffs 2 (Two) Times a Day.  12   • carvedilol (COREG) 12.5 MG tablet Take 1 tablet by mouth 2 (Two) Times a Day. 60 tablet 11   • cetirizine (zyrTEC) 5 MG tablet Take 1 tablet by mouth Daily As Needed for Allergies.     • cholecalciferol (VITAMIN D3) 25 MCG (1000 UT) tablet Take 1 tablet by mouth Daily.     • empagliflozin (Jardiance) 10 MG tablet tablet Take 1 tablet by mouth Daily for 30 days. 30 tablet 1   • famotidine (PEPCID) 10 MG tablet Take 10 mg by mouth 2 (Two) Times a Day.     • ferrous sulfate 325 (65 Fe) MG tablet Take  by mouth.     • furosemide (LASIX) 20 MG tablet Take 1 tablet by mouth Daily. 30 tablet 11   •  gabapentin (NEURONTIN) 400 MG capsule Take 400 mg by mouth 2 (Two) Times a Day.     • hydrALAZINE (APRESOLINE) 25 MG tablet Take 3 tablets by mouth Every 8 (Eight) Hours.     • HYDROcodone-acetaminophen (NORCO) 7.5-325 MG per tablet Take 1 tablet by mouth Every 4 (Four) Hours As Needed for Moderate Pain.     • insulin glargine (LANTUS, SEMGLEE) 100 UNIT/ML injection Inject 34 Units under the skin into the appropriate area as directed Every Night.  12   • insulin lispro (ADMELOG) 100 UNIT/ML injection Inject 10 Units under the skin into the appropriate area as directed 3 (Three) Times a Day With Meals.  12   • insulin lispro (humaLOG) 100 UNIT/ML injection Inject 0-10 Units under the skin into the appropriate area as directed 3 (Three) Times a Day Before Meals. 2 units for every 50 > 150     • levothyroxine (SYNTHROID, LEVOTHROID) 25 MCG tablet Take 1 tablet by mouth daily.     • LORazepam (ATIVAN) 0.5 MG tablet Take 1 tablet by mouth Every 8 (Eight) Hours As Needed for Anxiety. 10 tablet 0   • melatonin 1 MG tablet Take  by mouth.     • methocarbamol (ROBAXIN) 750 MG tablet Take 1 tablet by mouth Every 6 (Six) Hours As Needed for Muscle Spasms.     • montelukast (SINGULAIR) 10 MG tablet Take 1 tablet by mouth Every Night.     • polyethylene glycol (MIRALAX) 17 GM/SCOOP powder Take 17 g by mouth Daily.     • predniSONE (DELTASONE) 5 MG tablet Take 3 tablets by mouth Daily With Breakfast.     • sennosides-docusate (PERICOLACE) 8.6-50 MG per tablet Take 1 tablet by mouth Daily.     • sodium bicarbonate 650 MG tablet Take 1 tablet by mouth 2 (Two) Times a Day. 60 tablet 0   • tamsulosin (FLOMAX) 0.4 MG capsule 24 hr capsule Take 0.4 mg by mouth every night at bedtime.     • acetaminophen (TYLENOL) 325 MG tablet Take 650 mg by mouth Every 6 (Six) Hours As Needed.       No current facility-administered medications for this encounter.       Past Medical History:   Diagnosis Date   • Aortic valve stenosis     s/p tissue AVR    • Back pain    • CKD (chronic kidney disease)    • Colitis due to Clostridioides difficile 01/26/2022   • Diastolic dysfunction     Grade 2 per echocardiogram 2013   • Diverticulosis    • Exertional shortness of breath    • Heart disease    • Hiatal hernia    • Hyperlipidemia    • Hypertension    • Hyperthyroidism    • Hypertriglyceridemia 05/31/2018   • Hypothyroidism    • Left ventricular hypertrophy    • Legally blind    • Liver disease    • Macular degeneration    • Mitral regurgitation    • Osteoarthritis of hip    • Pancreatitis 01/26/2022   • Paroxysmal atrial fibrillation (HCC)    • Premature ventricular contractions    • Pulmonary hypertension (HCC)    • Renal insufficiency syndrome    • Type 2 diabetes mellitus (HCC)    • Uterine cancer (HCC)        Past Surgical History:   Procedure Laterality Date   • AORTIC VALVE REPAIR/REPLACEMENT     • CATARACT EXTRACTION      1970, 1999   • ENDOSCOPY  08/15/2014    no gross lesions in stomach/duodenum, erythrematous mucosa in stomach   • HYSTERECTOMY  2007   • STERNOTOMY         Physical Exam  Vitals and nursing note reviewed.   Constitutional:       General: She is not in acute distress.     Appearance: She is well-developed. She is obese. She is not ill-appearing.   HENT:      Head: Normocephalic and atraumatic.   Eyes:      Conjunctiva/sclera: Conjunctivae normal.      Pupils: Pupils are equal, round, and reactive to light.   Neck:      Vascular: No JVD.   Cardiovascular:      Rate and Rhythm: Normal rate. Rhythm irregular.      Heart sounds: Normal heart sounds. No murmur heard.    No friction rub. No gallop.   Pulmonary:      Effort: Pulmonary effort is normal. No respiratory distress.      Breath sounds: Normal breath sounds.   Abdominal:      General: Bowel sounds are normal. There is no distension.      Palpations: Abdomen is soft.   Musculoskeletal:         General: No swelling or deformity.   Skin:     General: Skin is warm and dry.      Capillary Refill:  "Capillary refill takes less than 2 seconds.   Neurological:      Mental Status: She is alert and oriented to person, place, and time. Mental status is at baseline.   Psychiatric:         Mood and Affect: Mood normal.         Behavior: Behavior normal.          Review of Systems   Constitutional: Positive for fatigue. Negative for unexpected weight change.   HENT: Negative for congestion and nosebleeds.    Eyes: Negative for photophobia and visual disturbance.   Respiratory: Negative for cough, chest tightness and shortness of breath.    Cardiovascular: Negative for chest pain, palpitations and leg swelling.   Gastrointestinal: Negative for abdominal distention and blood in stool.   Endocrine: Negative for polyphagia and polyuria.   Genitourinary: Positive for frequency. Negative for urgency.   Musculoskeletal: Negative for joint swelling and myalgias.   Skin: Negative for pallor and rash.   Neurological: Positive for weakness. Negative for dizziness, syncope, light-headedness, numbness and headaches.   Hematological: Does not bruise/bleed easily.   Psychiatric/Behavioral: Positive for sleep disturbance. Negative for confusion.        OBJECTIVE:  /51 (BP Location: Right arm, Patient Position: Sitting)   Pulse 59   Ht 144.8 cm (57.01\")   Wt 71.7 kg (158 lb) Comment: per patient  SpO2 96%   BMI 34.18 kg/m²      Body mass index is 34.18 kg/m².  Wt Readings from Last 1 Encounters:   09/20/22 71.7 kg (158 lb)       Lab Review:  Renal Function: Estimated Creatinine Clearance: 18.2 mL/min (A) (by C-G formula based on SCr of 1.78 mg/dL (H)).    Lab Results   Component Value Date    PROBNP 2,472.0 (H) 08/24/2022       Results for orders placed during the hospital encounter of 07/30/22    Adult Transthoracic Echo Complete W/ Cont if Necessary Per Protocol    Interpretation Summary  · Left ventricular ejection fraction appears to be 61 - 65%. Left ventricular systolic function is normal.  · Left ventricular wall " thickness is consistent with mild concentric hypertrophy.  · Left ventricular diastolic function is consistent with (grade II w/high LAP) pseudonormalization.  · Normal right ventricular cavity size and systolic function noted.  · The left atrial cavity is moderately dilated.  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 The aortic valve peak and mean gradients are within defined limits. The prosthetic aortic valve is grossly normal.  · There is severe mitral annular calcification. The mitral valve leaflets are thickened. There are several calcified chordae  · Mild mitral valve regurgitation is present. No significant mitral valve stenosis is present.  · Mild to moderate tricuspid valve regurgitation is present.  · Calculated right ventricular systolic pressure from tricuspid regurgitation is 37 mmHg.  · There is no evidence of pericardial effusion      Procedures      6 MINUTE WALK                      Cardiac Procedures:  1. N/A       Previously trialed diuretics  Lasix      Previously trialed GDMT    Spironolactone  Losartan  Carvedilol     ASSESSMENT:     Diagnosis Plan   1. Chronic heart failure with preserved ejection fraction (HCC)     2. Paroxysmal atrial fibrillation (HCC)     3. Type 2 diabetes mellitus with hyperglycemia, with long-term current use of insulin (HCC)     4. Stage 3b chronic kidney disease (HCC)     5. Pulmonary hypertension (HCC)     6. Essential hypertension           PLAN OF CARE:  1.  HFpEF-NYHA class II.  Most recent ejection fraction 60% per echocardiogram.  Renal function precludes GDMT.    She has developed a urinary tract infection on Jardiance.  I do believe she may tolerate Farxiga as anecdotally we have noticed that this does seem to have a lower incidence of UTI in females.  She is currently being treated for her UTI, we will stop her Jardiance, have her follow-up in about 6 weeks once she has had time to recover and regain some strength, and she is amenable to trialing  Farxiga then.  If she is unable to tolerate the Farxiga we will discontinue SGLT2 inhibitor altogether at that point.  She is to continue following a strict low-sodium diet of 2000 mg daily or less, fluid restriction 1500 mL daily, as well as remain adherent with daily weights.    Directions for when to call the clinic reviewed with the patient to include weight gain of 2 to 3 pounds in 24 hours, weight gain of 5 to 10 pounds within 7 days; worsening shortness of breath; worsening lower extremity edema or abdominal distention.    2.  Limited mobility-patient is wheelchair-bound.  This limits her activity level considerably.    3.  Hypertension- stable and controlled.    4.  Hyperlipidemia- stable and controlled.     5.  Paroxysmal atrial fibrillation- rate stable and controlled, rhythm irregular.  Currently on Lopressor.    6.  DM2- stable and well controlled on insulin.    7.  CKD 4-followed by nephrology.  Stable.        Stop Jardiance; follow-up in 6 weeks or sooner if needed        Advance Care Planning   Will discuss at subsequent visit      Thank you for allowing me to participate in the care of your patient,         RIGO Lowe  [facility] HEART FAILURE  09/20/22  14:47 EDT      **Leisa Disclaimer:**  Much of this encounter note is an electronic transcription/translation of spoken language to printed text. The electronic translation of spoken language may permit erroneous, or at times, nonsensical words or phrases to be inadvertently transcribed. Although I have reviewed the note for such errors, some may still exist.

## 2022-09-27 ENCOUNTER — TELEPHONE (OUTPATIENT)
Dept: ONCOLOGY | Facility: CLINIC | Age: 87
End: 2022-09-27

## 2022-09-27 ENCOUNTER — OFFICE VISIT (OUTPATIENT)
Dept: CARDIOLOGY | Facility: CLINIC | Age: 87
End: 2022-09-27

## 2022-09-27 VITALS
BODY MASS INDEX: 34.73 KG/M2 | SYSTOLIC BLOOD PRESSURE: 126 MMHG | WEIGHT: 161 LBS | HEIGHT: 57 IN | DIASTOLIC BLOOD PRESSURE: 78 MMHG | HEART RATE: 59 BPM

## 2022-09-27 DIAGNOSIS — I48.0 PAROXYSMAL ATRIAL FIBRILLATION: ICD-10-CM

## 2022-09-27 DIAGNOSIS — I35.0 NONRHEUMATIC AORTIC VALVE STENOSIS: ICD-10-CM

## 2022-09-27 DIAGNOSIS — I34.0 NONRHEUMATIC MITRAL VALVE REGURGITATION: ICD-10-CM

## 2022-09-27 DIAGNOSIS — I36.1 NONRHEUMATIC TRICUSPID VALVE REGURGITATION: ICD-10-CM

## 2022-09-27 DIAGNOSIS — I50.32 CHRONIC HEART FAILURE WITH PRESERVED EJECTION FRACTION: Primary | ICD-10-CM

## 2022-09-27 DIAGNOSIS — E78.1 HYPERTRIGLYCERIDEMIA: ICD-10-CM

## 2022-09-27 DIAGNOSIS — I10 ESSENTIAL HYPERTENSION: ICD-10-CM

## 2022-09-27 DIAGNOSIS — I49.3 PVC (PREMATURE VENTRICULAR CONTRACTION): ICD-10-CM

## 2022-09-27 PROCEDURE — 93000 ELECTROCARDIOGRAM COMPLETE: CPT | Performed by: INTERNAL MEDICINE

## 2022-09-27 PROCEDURE — 99214 OFFICE O/P EST MOD 30 MIN: CPT | Performed by: INTERNAL MEDICINE

## 2022-09-27 NOTE — TELEPHONE ENCOUNTER
Daughter returned my call and patient was discharged from Rehab facility on Saturday.  Patient needs weekly CBC's performed by home health and results to be faxed to our office.  I contacted [facility] at [phone] and order given.  I explained that this would need to be done this week.  She v/u.

## 2022-09-27 NOTE — PROGRESS NOTES
Date of Office Visit: 2022  Encounter Provider: Beth Butcher MD  Place of Service: [facility] CARDIOLOGY  Patient Name: Helena Carmen  :[id number]      Patient ID:  Helena Carmen is a 89 y.o. 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      She was originally seen for a pericardial effusion noted on a CT scan of the abdomen and  pelvis. On her initial exam there was a murmur, and she complained of dyspnea. She had a  PET stress study performed in 2010 which showed no ischemia. She had an echocardiogram  the same day which showed an ejection fraction of 67%, mild concentric left ventricular  hypertrophy, grade IA diastolic dysfunction, moderate to marked left atrial enlargement,  moderate aortic stenosis with a peak gradient of 42 mmHg and a mean gradient of 24 mmHg.   There was mild mitral and tricuspid insufficiency, with a small pericardial effusion. Her  carotid duplex study also performed on that day was normal as well.      She had increasing fatigue and dyspnea and was able to do very little, which she thought was due to her back pain.   She had a 2-D echocardiogram with Doppler in 2013. Her ejection   fraction was 58%. There was severe aortic stenosis with valvular area of 0.6 mm cubed, a   peak gradient of 71 with a mean of 41 mmHg. There was also moderate mitral insufficiency, severe   left atrial enlargement, right ventricular systolic pressure of 45 mmHg and grade II diastolic   dysfunction. She had a normal carotid duplex in 2013.     She went to [facility] after having a cardiac catheterization done. Her cardiac  catheterization showed minimal coronary artery disease. Dr. Murry saw her in the valve  clinic and agreed that she needed aortic valvular replacement. Dr. Lagunas saw her and  was in agreement as well. On 2013, she  had a 21 mm [employer] Mitch Trifecta bovine  pericardial valve placed. Postoperatively, she had renal insufficiency and atrial  fibrillation. These resolved.      She was in the hospital with C. Difficile colitis from 07/30/2013 to 08/09/2013. She also had klebsiella urinary tract infection  and was treated with Levaquin. She was in acute renal failure. She had some pancreatitis  at that time as well. During her hospitalization we did see  her and her cardiac status was stable.     She received 1 dose of IV Lasix in clinic on 2/23/2022 and oral Lasix was restarted at 40 mg twice daily.  Nephrology, of note, stopped losartan, increased hydralazine, and decreased Lasix on 2/28/2022.     Admission from 3/2/2022 to 3/9/2022 was noted with acute on chronic exacerbation of her diastolic heart failure and acute kidney injury.  She has since been treated with Lasix 40 mg every other day and daily spironolactone per nephrology recommendation.     She was at the [facility] but now is at home.       She follows with Dr. Sherman for her thrombocytopenia and sees him frequently.  He is concerned that she has ITP.  Her platelets seem to maintain as long she is on prednisone.  She follows with Dr. Art Wick from nephrology.     She was hospitalized 6/2022 with urinary tract infection.    She was hospitalized 8/24/2022 - 8/20/2022 for lumbar vertebral fracture.  Surgery recommended conservative treatment.    Labs on 9/14/2022 show glucose 247, creatinine 1.78, otherwise normal CMP, platelets 98, hemoglobin 10.7, otherwise unremarkable CBC.  Echo done 7/31/2022 showed ejection fraction 61-65% with mild concentric left ventricle hypertrophy, grade 2 diastolic dysfunction, normal bioprosthetic aortic valve, mild mitral insufficiency with mild to moderate tricuspid insufficiency, normal RVSP.  She had lower extremity Dopplers done 8/6/2022 showing acute left lower extremity DVT in the soleal vein and acute right lower extremity  DVT in the soleal vein, she is now on Eliquis.    Her breathing is stable.  She denies chest pain.  Her blood pressures been labile depending on what her pain level is with her back.  She does not feel her heart racing or skipping and she has no dizziness.  Her appetite is good.  Her granddaughter and daughter take very good care of her.  She is taking her medications as directed without difficulty.  She has no orthopnea or PND.  Her breathing is stable.  She is walking in her own home.    Past Medical History:   Diagnosis Date   • Aortic valve stenosis     s/p tissue AVR   • Back pain    • CKD (chronic kidney disease)    • Colitis due to Clostridioides difficile 01/26/2022   • Diastolic dysfunction     Grade 2 per echocardiogram 2013   • Diverticulosis    • Exertional shortness of breath    • Heart disease    • Hiatal hernia    • Hyperlipidemia    • Hypertension    • Hyperthyroidism    • Hypertriglyceridemia 05/31/2018   • Hypothyroidism    • Left ventricular hypertrophy    • Legally blind    • Liver disease    • Macular degeneration    • Mitral regurgitation    • Osteoarthritis of hip    • Pancreatitis 01/26/2022   • Paroxysmal atrial fibrillation (HCC)    • Premature ventricular contractions    • Pulmonary hypertension (HCC)    • Renal insufficiency syndrome    • Type 2 diabetes mellitus (HCC)    • Uterine cancer (HCC)          Past Surgical History:   Procedure Laterality Date   • AORTIC VALVE REPAIR/REPLACEMENT     • CATARACT EXTRACTION      1970, 1999   • ENDOSCOPY  08/15/2014    no gross lesions in stomach/duodenum, erythrematous mucosa in stomach   • HYSTERECTOMY  2007   • STERNOTOMY         Current Outpatient Medications on File Prior to Visit   Medication Sig Dispense Refill   • acetaminophen (TYLENOL) 325 MG tablet Take 650 mg by mouth Every 6 (Six) Hours As Needed.     • amLODIPine (NORVASC) 10 MG tablet Take 1 tablet by mouth daily.     • apixaban (ELIQUIS) 2.5 MG tablet tablet Take 2 tablets by mouth 2  (Two) Times a Day. 180 tablet 3   • budesonide-formoterol (SYMBICORT) 160-4.5 MCG/ACT inhaler Inhale 2 puffs 2 (Two) Times a Day.  12   • carvedilol (COREG) 12.5 MG tablet Take 1 tablet by mouth 2 (Two) Times a Day. 60 tablet 11   • cetirizine (zyrTEC) 5 MG tablet Take 1 tablet by mouth Daily As Needed for Allergies.     • cholecalciferol (VITAMIN D3) 25 MCG (1000 UT) tablet Take 1 tablet by mouth Daily.     • famotidine (PEPCID) 10 MG tablet Take 10 mg by mouth 2 (Two) Times a Day.     • furosemide (LASIX) 20 MG tablet Take 1 tablet by mouth Daily. 30 tablet 11   • gabapentin (NEURONTIN) 400 MG capsule Take 400 mg by mouth 2 (Two) Times a Day.     • hydrALAZINE (APRESOLINE) 25 MG tablet Take 3 tablets by mouth Every 8 (Eight) Hours.     • HYDROcodone-acetaminophen (NORCO)  MG per tablet Take 1 tablet by mouth Every 4 (Four) Hours As Needed for Moderate Pain.     • insulin glargine (LANTUS, SEMGLEE) 100 UNIT/ML injection Inject 34 Units under the skin into the appropriate area as directed Every Night.  12   • insulin lispro (humaLOG) 100 UNIT/ML injection Inject 0-10 Units under the skin into the appropriate area as directed 3 (Three) Times a Day Before Meals. 2 units for every 50 > 150     • levothyroxine (SYNTHROID, LEVOTHROID) 25 MCG tablet Take 1 tablet by mouth daily.     • LORazepam (ATIVAN) 0.5 MG tablet Take 1 tablet by mouth Every 8 (Eight) Hours As Needed for Anxiety. 10 tablet 0   • melatonin 1 MG tablet Take  by mouth.     • methocarbamol (ROBAXIN) 750 MG tablet Take 1 tablet by mouth Every 6 (Six) Hours As Needed for Muscle Spasms.     • montelukast (SINGULAIR) 10 MG tablet Take 1 tablet by mouth Every Night.     • polyethylene glycol (MIRALAX) 17 GM/SCOOP powder Take 17 g by mouth Daily.     • predniSONE (DELTASONE) 5 MG tablet Take 3 tablets by mouth Daily With Breakfast. (Patient taking differently: Take 10 mg by mouth Daily With Breakfast.)     • sennosides-docusate (PERICOLACE) 8.6-50 MG per  "tablet Take 1 tablet by mouth Daily.     • sodium bicarbonate 650 MG tablet Take 1 tablet by mouth 2 (Two) Times a Day. 60 tablet 0   • tamsulosin (FLOMAX) 0.4 MG capsule 24 hr capsule Take 0.4 mg by mouth every night at bedtime.     • [DISCONTINUED] apixaban (ELIQUIS) 5 MG tablet tablet Take 5 mg by mouth 2 (Two) Times a Day.     • [DISCONTINUED] atorvastatin (LIPITOR) 20 MG tablet Take 20 mg by mouth Daily.     • [DISCONTINUED] albuterol (PROVENTIL) (2.5 MG/3ML) 0.083% nebulizer solution Take 2.5 mg by nebulization Every 6 (Six) Hours As Needed for Shortness of Air.  12   • [DISCONTINUED] ascorbic acid (VITAMIN C) 500 MG tablet Take 1 tablet by mouth Daily.     • [DISCONTINUED] empagliflozin (Jardiance) 10 MG tablet tablet Take 1 tablet by mouth Daily for 30 days. 30 tablet 1   • [DISCONTINUED] ferrous sulfate 325 (65 Fe) MG tablet Take  by mouth.     • [DISCONTINUED] insulin lispro (ADMELOG) 100 UNIT/ML injection Inject 10 Units under the skin into the appropriate area as directed 3 (Three) Times a Day With Meals.  12     No current facility-administered medications on file prior to visit.       Social History     Socioeconomic History   • Marital status:    Tobacco Use   • Smoking status: Former Smoker     Packs/day: 0.50     Quit date: 7/10/1969     Years since quittin.2   • Smokeless tobacco: Never Used   Substance and Sexual Activity   • Alcohol use: No     Comment: caffeine use - coffee 2 cups daily   • Drug use: No           ROS    Procedures    ECG 12 Lead    Date/Time: 2022 2:42 PM  Performed by: Beth Butcher MD  Authorized by: Beth Butcher MD   Comparison: compared with previous ECG   Similar to previous ECG  Rhythm: sinus rhythm  ST Depression: II, aVF, V5 and V6    Clinical impression: abnormal EKG                Objective:      Vitals:    22 1350   BP: 126/78   Pulse: 59   Weight: 73 kg (161 lb)   Height: 144.8 cm (57\")     Body mass index is 34.84 " kg/m².    Vitals reviewed.   Constitutional:       General: Not in acute distress.     Appearance: Well-developed. Not diaphoretic.   Eyes:      General: No scleral icterus.     Conjunctiva/sclera: Conjunctivae normal.   HENT:      Head: Normocephalic and atraumatic.   Neck:      Thyroid: No thyromegaly.      Vascular: No carotid bruit or JVD.      Lymphadenopathy: No cervical adenopathy.   Pulmonary:      Effort: Pulmonary effort is normal. No respiratory distress.      Breath sounds: Normal breath sounds. No wheezing. No rhonchi. No rales.   Chest:      Chest wall: Not tender to palpatation.   Cardiovascular:      Normal rate. Regular rhythm.      Murmurs: There is no murmur.      No gallop.   Pulses:     Intact distal pulses.   Edema:     Peripheral edema present.     Ankle: bilateral 1+ edema of the ankle.  Abdominal:      General: Bowel sounds are normal. There is no distension or abdominal bruit.      Palpations: Abdomen is soft. There is no abdominal mass.      Tenderness: There is no abdominal tenderness.   Musculoskeletal:         General: No deformity.      Extremities: No clubbing present.     Cervical back: Neck supple. Skin:     General: Skin is warm and dry. There is no cyanosis.      Coloration: Skin is not pale.      Findings: No rash.   Neurological:      Mental Status: Alert and oriented to person, place, and time.      Cranial Nerves: No cranial nerve deficit.   Psychiatric:         Judgment: Judgment normal.         Lab Review:       Assessment:      Diagnosis Plan   1. Chronic heart failure with preserved ejection fraction (HCC)  Compression Stockings   2. Nonrheumatic aortic valve stenosis     3. Essential hypertension  Compression Stockings   4. Hypertriglyceridemia     5. Nonrheumatic mitral valve regurgitation     6. Nonrheumatic tricuspid valve regurgitation     7. Paroxysmal atrial fibrillation (HCC)     8. PVC (premature ventricular contraction)       1. Severe aortic stenosis, status  post AVR [employer]. Mitch Trifecta bovine   pericardial valve, 21 mm on 06/08/2013. Doing well.   2.  HFpEF, chronic with history of acute exacerbations.  Right now her HFpEF is stable, she is not decompensated.  3. Small hiatal hernia. Followed by Dr. Freddy Martel.  4. Diverticular disease. Followed by Dr. Freddy Martel.  5. Diabetes mellitus, type 2. Followed by Dr. Hickman.   6. Grade II diastolic dysfunction.  7. Hypothyroidism.  8. 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  9. HTN, labile.  10.  CKD, stable but possibly made worse at times by uncontrolled hypertension.  Follows with Dr. Art Wick.  11.  Thrombocytopenia, likely ITP.  Seeing Dr. Sherman.  12.  Bilateral lower extremity DVTs, now on apixaban, diagnosed 8/2022  13.  Lumbar vertebral fracture diagnosed 8/2022, treated conservatively.  Has back pain and this increases her blood pressure.     Plan:       See Shikha in 3 months, will see Mirta Fenton 11/4.  Stop atorvastatin.  She does not have hyperlipidemia.  She has no history of coronary artery disease.  Decrease apixaban to 2.5 mg twice daily because of her age and renal function.  I know this is somewhat subtherapeutic treatment for DVTs but I worry about her bleeding with her thrombocytopenia.

## 2022-10-16 ENCOUNTER — APPOINTMENT (OUTPATIENT)
Dept: CT IMAGING | Facility: HOSPITAL | Age: 87
End: 2022-10-16

## 2022-10-16 ENCOUNTER — HOSPITAL ENCOUNTER (INPATIENT)
Facility: HOSPITAL | Age: 87
LOS: 3 days | Discharge: HOME HEALTH | End: 2022-10-20
Attending: EMERGENCY MEDICINE | Admitting: INTERNAL MEDICINE

## 2022-10-16 DIAGNOSIS — G89.29 CHRONIC MIDLINE LOW BACK PAIN WITH BILATERAL SCIATICA: Primary | ICD-10-CM

## 2022-10-16 DIAGNOSIS — M54.42 CHRONIC MIDLINE LOW BACK PAIN WITH BILATERAL SCIATICA: Primary | ICD-10-CM

## 2022-10-16 DIAGNOSIS — M54.41 CHRONIC MIDLINE LOW BACK PAIN WITH BILATERAL SCIATICA: Primary | ICD-10-CM

## 2022-10-16 LAB
ANION GAP SERPL CALCULATED.3IONS-SCNC: 11.7 MMOL/L (ref 5–15)
BASOPHILS # BLD AUTO: 0.01 10*3/MM3 (ref 0–0.2)
BASOPHILS NFR BLD AUTO: 0.2 % (ref 0–1.5)
BUN SERPL-MCNC: 23 MG/DL (ref 8–23)
BUN/CREAT SERPL: 13.8 (ref 7–25)
CALCIUM SPEC-SCNC: 9.2 MG/DL (ref 8.2–9.6)
CHLORIDE SERPL-SCNC: 102 MMOL/L (ref 98–107)
CO2 SERPL-SCNC: 23.3 MMOL/L (ref 22–29)
CREAT SERPL-MCNC: 1.67 MG/DL (ref 0.57–1)
DEPRECATED RDW RBC AUTO: 48.5 FL (ref 37–54)
EGFRCR SERPLBLD CKD-EPI 2021: 28.8 ML/MIN/1.73
EOSINOPHIL # BLD AUTO: 0.01 10*3/MM3 (ref 0–0.4)
EOSINOPHIL NFR BLD AUTO: 0.2 % (ref 0.3–6.2)
ERYTHROCYTE [DISTWIDTH] IN BLOOD BY AUTOMATED COUNT: 15 % (ref 12.3–15.4)
GLUCOSE BLDC GLUCOMTR-MCNC: 237 MG/DL (ref 70–130)
GLUCOSE SERPL-MCNC: 152 MG/DL (ref 65–99)
HCT VFR BLD AUTO: 41.6 % (ref 34–46.6)
HGB BLD-MCNC: 13.1 G/DL (ref 12–15.9)
LYMPHOCYTES # BLD AUTO: 1.44 10*3/MM3 (ref 0.7–3.1)
LYMPHOCYTES NFR BLD AUTO: 23.4 % (ref 19.6–45.3)
MCH RBC QN AUTO: 27.7 PG (ref 26.6–33)
MCHC RBC AUTO-ENTMCNC: 31.5 G/DL (ref 31.5–35.7)
MCV RBC AUTO: 87.9 FL (ref 79–97)
MONOCYTES # BLD AUTO: 0.24 10*3/MM3 (ref 0.1–0.9)
MONOCYTES NFR BLD AUTO: 3.9 % (ref 5–12)
NEUTROPHILS NFR BLD AUTO: 4.44 10*3/MM3 (ref 1.7–7)
NEUTROPHILS NFR BLD AUTO: 72.1 % (ref 42.7–76)
PLATELET # BLD AUTO: 66 10*3/MM3 (ref 140–450)
PMV BLD AUTO: 11.2 FL (ref 6–12)
POTASSIUM SERPL-SCNC: 4.3 MMOL/L (ref 3.5–5.2)
RBC # BLD AUTO: 4.73 10*6/MM3 (ref 3.77–5.28)
SODIUM SERPL-SCNC: 137 MMOL/L (ref 136–145)
WBC NRBC COR # BLD: 6.15 10*3/MM3 (ref 3.4–10.8)

## 2022-10-16 PROCEDURE — 25010000002 ONDANSETRON PER 1 MG: Performed by: EMERGENCY MEDICINE

## 2022-10-16 PROCEDURE — 25010000002 HYDROMORPHONE PER 4 MG: Performed by: EMERGENCY MEDICINE

## 2022-10-16 PROCEDURE — G0378 HOSPITAL OBSERVATION PER HR: HCPCS

## 2022-10-16 PROCEDURE — 80048 BASIC METABOLIC PNL TOTAL CA: CPT | Performed by: EMERGENCY MEDICINE

## 2022-10-16 PROCEDURE — 99284 EMERGENCY DEPT VISIT MOD MDM: CPT

## 2022-10-16 PROCEDURE — 85025 COMPLETE CBC W/AUTO DIFF WBC: CPT | Performed by: EMERGENCY MEDICINE

## 2022-10-16 PROCEDURE — 82962 GLUCOSE BLOOD TEST: CPT

## 2022-10-16 PROCEDURE — 74176 CT ABD & PELVIS W/O CONTRAST: CPT

## 2022-10-16 RX ORDER — POLYETHYLENE GLYCOL 3350 17 G/17G
17 POWDER, FOR SOLUTION ORAL DAILY
Status: DISCONTINUED | OUTPATIENT
Start: 2022-10-17 | End: 2022-10-20 | Stop reason: HOSPADM

## 2022-10-16 RX ORDER — MONTELUKAST SODIUM 10 MG/1
10 TABLET ORAL NIGHTLY
Status: DISCONTINUED | OUTPATIENT
Start: 2022-10-16 | End: 2022-10-20 | Stop reason: HOSPADM

## 2022-10-16 RX ORDER — DEXTROSE MONOHYDRATE 25 G/50ML
25 INJECTION, SOLUTION INTRAVENOUS
Status: DISCONTINUED | OUTPATIENT
Start: 2022-10-16 | End: 2022-10-20 | Stop reason: HOSPADM

## 2022-10-16 RX ORDER — ONDANSETRON 2 MG/ML
4 INJECTION INTRAMUSCULAR; INTRAVENOUS ONCE
Status: COMPLETED | OUTPATIENT
Start: 2022-10-16 | End: 2022-10-16

## 2022-10-16 RX ORDER — SODIUM BICARBONATE 650 MG/1
650 TABLET ORAL 2 TIMES DAILY
Status: DISCONTINUED | OUTPATIENT
Start: 2022-10-16 | End: 2022-10-20 | Stop reason: HOSPADM

## 2022-10-16 RX ORDER — ACETAMINOPHEN 160 MG/5ML
650 SOLUTION ORAL EVERY 4 HOURS PRN
Status: DISCONTINUED | OUTPATIENT
Start: 2022-10-16 | End: 2022-10-20 | Stop reason: HOSPADM

## 2022-10-16 RX ORDER — GABAPENTIN 400 MG/1
400 CAPSULE ORAL 2 TIMES DAILY
Status: DISCONTINUED | OUTPATIENT
Start: 2022-10-16 | End: 2022-10-18

## 2022-10-16 RX ORDER — MELATONIN
1000 DAILY
Status: DISCONTINUED | OUTPATIENT
Start: 2022-10-17 | End: 2022-10-20 | Stop reason: HOSPADM

## 2022-10-16 RX ORDER — HYDROMORPHONE HYDROCHLORIDE 1 MG/ML
0.5 INJECTION, SOLUTION INTRAMUSCULAR; INTRAVENOUS; SUBCUTANEOUS EVERY 4 HOURS PRN
Status: DISCONTINUED | OUTPATIENT
Start: 2022-10-16 | End: 2022-10-20 | Stop reason: HOSPADM

## 2022-10-16 RX ORDER — ACETAMINOPHEN 325 MG/1
650 TABLET ORAL EVERY 4 HOURS PRN
Status: DISCONTINUED | OUTPATIENT
Start: 2022-10-16 | End: 2022-10-20 | Stop reason: HOSPADM

## 2022-10-16 RX ORDER — HYDROCODONE BITARTRATE AND ACETAMINOPHEN 10; 325 MG/1; MG/1
1 TABLET ORAL EVERY 4 HOURS PRN
Status: DISCONTINUED | OUTPATIENT
Start: 2022-10-16 | End: 2022-10-18

## 2022-10-16 RX ORDER — CARVEDILOL 12.5 MG/1
12.5 TABLET ORAL 2 TIMES DAILY
Status: DISCONTINUED | OUTPATIENT
Start: 2022-10-16 | End: 2022-10-20 | Stop reason: HOSPADM

## 2022-10-16 RX ORDER — AMOXICILLIN 250 MG
1 CAPSULE ORAL DAILY
Status: DISCONTINUED | OUTPATIENT
Start: 2022-10-17 | End: 2022-10-20 | Stop reason: HOSPADM

## 2022-10-16 RX ORDER — HYDROMORPHONE HYDROCHLORIDE 1 MG/ML
0.5 INJECTION, SOLUTION INTRAMUSCULAR; INTRAVENOUS; SUBCUTANEOUS ONCE
Status: COMPLETED | OUTPATIENT
Start: 2022-10-16 | End: 2022-10-16

## 2022-10-16 RX ORDER — NICOTINE POLACRILEX 4 MG
15 LOZENGE BUCCAL
Status: DISCONTINUED | OUTPATIENT
Start: 2022-10-16 | End: 2022-10-20 | Stop reason: HOSPADM

## 2022-10-16 RX ORDER — AMLODIPINE BESYLATE 10 MG/1
10 TABLET ORAL DAILY
Status: DISCONTINUED | OUTPATIENT
Start: 2022-10-17 | End: 2022-10-20 | Stop reason: HOSPADM

## 2022-10-16 RX ORDER — INSULIN LISPRO 100 [IU]/ML
0-9 INJECTION, SOLUTION INTRAVENOUS; SUBCUTANEOUS
Status: DISCONTINUED | OUTPATIENT
Start: 2022-10-17 | End: 2022-10-20 | Stop reason: HOSPADM

## 2022-10-16 RX ORDER — ONDANSETRON 2 MG/ML
4 INJECTION INTRAMUSCULAR; INTRAVENOUS EVERY 6 HOURS PRN
Status: DISCONTINUED | OUTPATIENT
Start: 2022-10-16 | End: 2022-10-20 | Stop reason: HOSPADM

## 2022-10-16 RX ORDER — PREDNISONE 10 MG/1
10 TABLET ORAL
Status: DISCONTINUED | OUTPATIENT
Start: 2022-10-17 | End: 2022-10-18

## 2022-10-16 RX ORDER — FAMOTIDINE 20 MG/1
10 TABLET, FILM COATED ORAL 2 TIMES DAILY
Status: DISCONTINUED | OUTPATIENT
Start: 2022-10-16 | End: 2022-10-20 | Stop reason: HOSPADM

## 2022-10-16 RX ORDER — SODIUM CHLORIDE 0.9 % (FLUSH) 0.9 %
10 SYRINGE (ML) INJECTION AS NEEDED
Status: DISCONTINUED | OUTPATIENT
Start: 2022-10-16 | End: 2022-10-20 | Stop reason: HOSPADM

## 2022-10-16 RX ORDER — METHOCARBAMOL 750 MG/1
750 TABLET, FILM COATED ORAL EVERY 6 HOURS PRN
Status: DISCONTINUED | OUTPATIENT
Start: 2022-10-16 | End: 2022-10-17

## 2022-10-16 RX ORDER — LEVOTHYROXINE SODIUM 0.03 MG/1
25 TABLET ORAL
Status: DISCONTINUED | OUTPATIENT
Start: 2022-10-17 | End: 2022-10-20 | Stop reason: HOSPADM

## 2022-10-16 RX ORDER — TAMSULOSIN HYDROCHLORIDE 0.4 MG/1
0.4 CAPSULE ORAL DAILY
Status: DISCONTINUED | OUTPATIENT
Start: 2022-10-17 | End: 2022-10-20 | Stop reason: HOSPADM

## 2022-10-16 RX ORDER — BUDESONIDE AND FORMOTEROL FUMARATE DIHYDRATE 160; 4.5 UG/1; UG/1
2 AEROSOL RESPIRATORY (INHALATION)
Status: DISCONTINUED | OUTPATIENT
Start: 2022-10-16 | End: 2022-10-20 | Stop reason: HOSPADM

## 2022-10-16 RX ORDER — LORAZEPAM 0.5 MG/1
0.5 TABLET ORAL EVERY 8 HOURS PRN
Status: DISCONTINUED | OUTPATIENT
Start: 2022-10-16 | End: 2022-10-20 | Stop reason: HOSPADM

## 2022-10-16 RX ORDER — CETIRIZINE HYDROCHLORIDE 10 MG/1
10 TABLET ORAL DAILY
Status: DISCONTINUED | OUTPATIENT
Start: 2022-10-17 | End: 2022-10-20 | Stop reason: HOSPADM

## 2022-10-16 RX ORDER — FUROSEMIDE 20 MG/1
20 TABLET ORAL DAILY
Status: DISCONTINUED | OUTPATIENT
Start: 2022-10-17 | End: 2022-10-20 | Stop reason: HOSPADM

## 2022-10-16 RX ORDER — ACETAMINOPHEN 650 MG/1
650 SUPPOSITORY RECTAL EVERY 4 HOURS PRN
Status: DISCONTINUED | OUTPATIENT
Start: 2022-10-16 | End: 2022-10-20 | Stop reason: HOSPADM

## 2022-10-16 RX ADMIN — GABAPENTIN 400 MG: 400 CAPSULE ORAL at 22:03

## 2022-10-16 RX ADMIN — CARVEDILOL 12.5 MG: 12.5 TABLET, FILM COATED ORAL at 22:02

## 2022-10-16 RX ADMIN — SODIUM BICARBONATE 650 MG: 650 TABLET ORAL at 22:02

## 2022-10-16 RX ADMIN — INSULIN GLARGINE-YFGN 34 UNITS: 100 INJECTION, SOLUTION SUBCUTANEOUS at 22:05

## 2022-10-16 RX ADMIN — FAMOTIDINE 10 MG: 20 TABLET ORAL at 22:03

## 2022-10-16 RX ADMIN — ONDANSETRON 4 MG: 2 INJECTION INTRAMUSCULAR; INTRAVENOUS at 15:48

## 2022-10-16 RX ADMIN — HYDRALAZINE HYDROCHLORIDE 75 MG: 50 TABLET, FILM COATED ORAL at 22:02

## 2022-10-16 RX ADMIN — MONTELUKAST SODIUM 10 MG: 10 TABLET, FILM COATED ORAL at 22:02

## 2022-10-16 RX ADMIN — HYDROMORPHONE HYDROCHLORIDE 0.5 MG: 1 INJECTION, SOLUTION INTRAMUSCULAR; INTRAVENOUS; SUBCUTANEOUS at 15:48

## 2022-10-16 RX ADMIN — HYDROCODONE BITARTRATE AND ACETAMINOPHEN 1 TABLET: 10; 325 TABLET ORAL at 22:03

## 2022-10-17 ENCOUNTER — APPOINTMENT (OUTPATIENT)
Dept: CARDIOLOGY | Facility: HOSPITAL | Age: 87
End: 2022-10-17

## 2022-10-17 ENCOUNTER — APPOINTMENT (OUTPATIENT)
Dept: GENERAL RADIOLOGY | Facility: HOSPITAL | Age: 87
End: 2022-10-17

## 2022-10-17 ENCOUNTER — APPOINTMENT (OUTPATIENT)
Dept: MRI IMAGING | Facility: HOSPITAL | Age: 87
End: 2022-10-17

## 2022-10-17 PROBLEM — I82.4Z3: Status: ACTIVE | Noted: 2022-10-17

## 2022-10-17 PROBLEM — D89.831 CYTOKINE RELEASE SYNDROME, GRADE 1: Status: RESOLVED | Noted: 2022-08-02 | Resolved: 2022-10-17

## 2022-10-17 PROBLEM — S32.000A LUMBAR COMPRESSION FRACTURE, CLOSED, INITIAL ENCOUNTER: Status: ACTIVE | Noted: 2022-10-17

## 2022-10-17 LAB
ANION GAP SERPL CALCULATED.3IONS-SCNC: 8.3 MMOL/L (ref 5–15)
BASOPHILS # BLD AUTO: 0.03 10*3/MM3 (ref 0–0.2)
BASOPHILS NFR BLD AUTO: 0.5 % (ref 0–1.5)
BH CV LOW VAS LEFT SOLEAL VESSEL: 1
BH CV LOW VAS RIGHT SOLEAL VESSEL: 1
BH CV LOWER VASCULAR LEFT COMMON FEMORAL AUGMENT: NORMAL
BH CV LOWER VASCULAR LEFT COMMON FEMORAL COMPETENT: NORMAL
BH CV LOWER VASCULAR LEFT COMMON FEMORAL COMPRESS: NORMAL
BH CV LOWER VASCULAR LEFT COMMON FEMORAL PHASIC: NORMAL
BH CV LOWER VASCULAR LEFT COMMON FEMORAL SPONT: NORMAL
BH CV LOWER VASCULAR LEFT DISTAL FEMORAL COMPRESS: NORMAL
BH CV LOWER VASCULAR LEFT GASTRONEMIUS COMPRESS: NORMAL
BH CV LOWER VASCULAR LEFT GREATER SAPH AK COMPRESS: NORMAL
BH CV LOWER VASCULAR LEFT GREATER SAPH BK COMPRESS: NORMAL
BH CV LOWER VASCULAR LEFT LESSER SAPH COMPRESS: NORMAL
BH CV LOWER VASCULAR LEFT MID FEMORAL AUGMENT: NORMAL
BH CV LOWER VASCULAR LEFT MID FEMORAL COMPETENT: NORMAL
BH CV LOWER VASCULAR LEFT MID FEMORAL COMPRESS: NORMAL
BH CV LOWER VASCULAR LEFT MID FEMORAL PHASIC: NORMAL
BH CV LOWER VASCULAR LEFT MID FEMORAL SPONT: NORMAL
BH CV LOWER VASCULAR LEFT PERONEAL COMPRESS: NORMAL
BH CV LOWER VASCULAR LEFT POPLITEAL AUGMENT: NORMAL
BH CV LOWER VASCULAR LEFT POPLITEAL COMPETENT: NORMAL
BH CV LOWER VASCULAR LEFT POPLITEAL COMPRESS: NORMAL
BH CV LOWER VASCULAR LEFT POPLITEAL PHASIC: NORMAL
BH CV LOWER VASCULAR LEFT POPLITEAL SPONT: NORMAL
BH CV LOWER VASCULAR LEFT POSTERIOR TIBIAL COMPRESS: NORMAL
BH CV LOWER VASCULAR LEFT PROFUNDA FEMORAL COMPRESS: NORMAL
BH CV LOWER VASCULAR LEFT PROXIMAL FEMORAL COMPRESS: NORMAL
BH CV LOWER VASCULAR LEFT SAPHENOFEMORAL JUNCTION COMPRESS: NORMAL
BH CV LOWER VASCULAR LEFT SOLEAL COMPRESS: NORMAL
BH CV LOWER VASCULAR LEFT SOLEAL THROMBUS: NORMAL
BH CV LOWER VASCULAR RIGHT COMMON FEMORAL AUGMENT: NORMAL
BH CV LOWER VASCULAR RIGHT COMMON FEMORAL COMPETENT: NORMAL
BH CV LOWER VASCULAR RIGHT COMMON FEMORAL COMPRESS: NORMAL
BH CV LOWER VASCULAR RIGHT COMMON FEMORAL PHASIC: NORMAL
BH CV LOWER VASCULAR RIGHT COMMON FEMORAL SPONT: NORMAL
BH CV LOWER VASCULAR RIGHT DISTAL FEMORAL COMPRESS: NORMAL
BH CV LOWER VASCULAR RIGHT GASTRONEMIUS COMPRESS: NORMAL
BH CV LOWER VASCULAR RIGHT GREATER SAPH AK COMPRESS: NORMAL
BH CV LOWER VASCULAR RIGHT GREATER SAPH BK COMPRESS: NORMAL
BH CV LOWER VASCULAR RIGHT LESSER SAPH COMPRESS: NORMAL
BH CV LOWER VASCULAR RIGHT MID FEMORAL AUGMENT: NORMAL
BH CV LOWER VASCULAR RIGHT MID FEMORAL COMPETENT: NORMAL
BH CV LOWER VASCULAR RIGHT MID FEMORAL COMPRESS: NORMAL
BH CV LOWER VASCULAR RIGHT MID FEMORAL PHASIC: NORMAL
BH CV LOWER VASCULAR RIGHT MID FEMORAL SPONT: NORMAL
BH CV LOWER VASCULAR RIGHT PERONEAL COMPRESS: NORMAL
BH CV LOWER VASCULAR RIGHT POPLITEAL AUGMENT: NORMAL
BH CV LOWER VASCULAR RIGHT POPLITEAL COMPETENT: NORMAL
BH CV LOWER VASCULAR RIGHT POPLITEAL COMPRESS: NORMAL
BH CV LOWER VASCULAR RIGHT POPLITEAL PHASIC: NORMAL
BH CV LOWER VASCULAR RIGHT POPLITEAL SPONT: NORMAL
BH CV LOWER VASCULAR RIGHT POSTERIOR TIBIAL COMPRESS: NORMAL
BH CV LOWER VASCULAR RIGHT PROFUNDA FEMORAL COMPRESS: NORMAL
BH CV LOWER VASCULAR RIGHT PROXIMAL FEMORAL COMPRESS: NORMAL
BH CV LOWER VASCULAR RIGHT SAPHENOFEMORAL JUNCTION COMPRESS: NORMAL
BH CV LOWER VASCULAR RIGHT SOLEAL COMPRESS: NORMAL
BH CV LOWER VASCULAR RIGHT SOLEAL THROMBUS: NORMAL
BUN SERPL-MCNC: 27 MG/DL (ref 8–23)
BUN/CREAT SERPL: 16.3 (ref 7–25)
CALCIUM SPEC-SCNC: 9 MG/DL (ref 8.2–9.6)
CHLORIDE SERPL-SCNC: 105 MMOL/L (ref 98–107)
CO2 SERPL-SCNC: 23.7 MMOL/L (ref 22–29)
CREAT SERPL-MCNC: 1.66 MG/DL (ref 0.57–1)
DEPRECATED RDW RBC AUTO: 48.5 FL (ref 37–54)
EGFRCR SERPLBLD CKD-EPI 2021: 29 ML/MIN/1.73
EOSINOPHIL # BLD AUTO: 0.08 10*3/MM3 (ref 0–0.4)
EOSINOPHIL NFR BLD AUTO: 1.3 % (ref 0.3–6.2)
ERYTHROCYTE [DISTWIDTH] IN BLOOD BY AUTOMATED COUNT: 15.2 % (ref 12.3–15.4)
GLUCOSE BLDC GLUCOMTR-MCNC: 143 MG/DL (ref 70–130)
GLUCOSE BLDC GLUCOMTR-MCNC: 224 MG/DL (ref 70–130)
GLUCOSE BLDC GLUCOMTR-MCNC: 230 MG/DL (ref 70–130)
GLUCOSE BLDC GLUCOMTR-MCNC: 247 MG/DL (ref 70–130)
GLUCOSE SERPL-MCNC: 162 MG/DL (ref 65–99)
HBA1C MFR BLD: 5.4 % (ref 4.8–5.6)
HCT VFR BLD AUTO: 39.9 % (ref 34–46.6)
HGB BLD-MCNC: 12.4 G/DL (ref 12–15.9)
LYMPHOCYTES # BLD AUTO: 1.76 10*3/MM3 (ref 0.7–3.1)
LYMPHOCYTES NFR BLD AUTO: 27.7 % (ref 19.6–45.3)
MAXIMAL PREDICTED HEART RATE: 129 BPM
MCH RBC QN AUTO: 27.6 PG (ref 26.6–33)
MCHC RBC AUTO-ENTMCNC: 31.1 G/DL (ref 31.5–35.7)
MCV RBC AUTO: 88.9 FL (ref 79–97)
MONOCYTES # BLD AUTO: 0.68 10*3/MM3 (ref 0.1–0.9)
MONOCYTES NFR BLD AUTO: 10.7 % (ref 5–12)
NEUTROPHILS NFR BLD AUTO: 3.78 10*3/MM3 (ref 1.7–7)
NEUTROPHILS NFR BLD AUTO: 59.5 % (ref 42.7–76)
PLATELET # BLD AUTO: 60 10*3/MM3 (ref 140–450)
PMV BLD AUTO: 11.5 FL (ref 6–12)
POTASSIUM SERPL-SCNC: 4.2 MMOL/L (ref 3.5–5.2)
RBC # BLD AUTO: 4.49 10*6/MM3 (ref 3.77–5.28)
SODIUM SERPL-SCNC: 137 MMOL/L (ref 136–145)
STRESS TARGET HR: 110 BPM
WBC NRBC COR # BLD: 6.35 10*3/MM3 (ref 3.4–10.8)

## 2022-10-17 PROCEDURE — 85025 COMPLETE CBC W/AUTO DIFF WBC: CPT | Performed by: INTERNAL MEDICINE

## 2022-10-17 PROCEDURE — 94640 AIRWAY INHALATION TREATMENT: CPT

## 2022-10-17 PROCEDURE — 83036 HEMOGLOBIN GLYCOSYLATED A1C: CPT | Performed by: INTERNAL MEDICINE

## 2022-10-17 PROCEDURE — 99222 1ST HOSP IP/OBS MODERATE 55: CPT | Performed by: NURSE PRACTITIONER

## 2022-10-17 PROCEDURE — 97162 PT EVAL MOD COMPLEX 30 MIN: CPT

## 2022-10-17 PROCEDURE — 25010000002 ONDANSETRON PER 1 MG: Performed by: INTERNAL MEDICINE

## 2022-10-17 PROCEDURE — 97530 THERAPEUTIC ACTIVITIES: CPT

## 2022-10-17 PROCEDURE — 82962 GLUCOSE BLOOD TEST: CPT

## 2022-10-17 PROCEDURE — 63710000001 INSULIN LISPRO (HUMAN) PER 5 UNITS: Performed by: INTERNAL MEDICINE

## 2022-10-17 PROCEDURE — 63710000001 PREDNISONE PER 5 MG: Performed by: INTERNAL MEDICINE

## 2022-10-17 PROCEDURE — 94799 UNLISTED PULMONARY SVC/PX: CPT

## 2022-10-17 PROCEDURE — 72148 MRI LUMBAR SPINE W/O DYE: CPT

## 2022-10-17 PROCEDURE — 80048 BASIC METABOLIC PNL TOTAL CA: CPT | Performed by: INTERNAL MEDICINE

## 2022-10-17 PROCEDURE — 25010000002 ENOXAPARIN PER 10 MG: Performed by: HOSPITALIST

## 2022-10-17 PROCEDURE — 25010000002 HYDROMORPHONE PER 4 MG: Performed by: INTERNAL MEDICINE

## 2022-10-17 PROCEDURE — 93970 EXTREMITY STUDY: CPT

## 2022-10-17 PROCEDURE — 97166 OT EVAL MOD COMPLEX 45 MIN: CPT

## 2022-10-17 PROCEDURE — 72100 X-RAY EXAM L-S SPINE 2/3 VWS: CPT

## 2022-10-17 PROCEDURE — 97535 SELF CARE MNGMENT TRAINING: CPT

## 2022-10-17 RX ORDER — HYDRALAZINE HYDROCHLORIDE 50 MG/1
75 TABLET, FILM COATED ORAL EVERY 8 HOURS SCHEDULED
Status: DISCONTINUED | OUTPATIENT
Start: 2022-10-17 | End: 2022-10-20 | Stop reason: HOSPADM

## 2022-10-17 RX ORDER — METHOCARBAMOL 750 MG/1
750 TABLET, FILM COATED ORAL EVERY 6 HOURS SCHEDULED
Status: DISCONTINUED | OUTPATIENT
Start: 2022-10-17 | End: 2022-10-20 | Stop reason: HOSPADM

## 2022-10-17 RX ORDER — ENOXAPARIN SODIUM 100 MG/ML
30 INJECTION SUBCUTANEOUS NIGHTLY
Status: DISCONTINUED | OUTPATIENT
Start: 2022-10-17 | End: 2022-10-20 | Stop reason: HOSPADM

## 2022-10-17 RX ADMIN — LEVOTHYROXINE SODIUM 25 MCG: 0.03 TABLET ORAL at 06:11

## 2022-10-17 RX ADMIN — HYDRALAZINE HYDROCHLORIDE 75 MG: 50 TABLET, FILM COATED ORAL at 21:59

## 2022-10-17 RX ADMIN — METHOCARBAMOL TABLETS 750 MG: 750 TABLET, COATED ORAL at 17:21

## 2022-10-17 RX ADMIN — INSULIN LISPRO 4 UNITS: 100 INJECTION, SOLUTION INTRAVENOUS; SUBCUTANEOUS at 11:51

## 2022-10-17 RX ADMIN — BUDESONIDE AND FORMOTEROL FUMARATE DIHYDRATE 2 PUFF: 160; 4.5 AEROSOL RESPIRATORY (INHALATION) at 21:02

## 2022-10-17 RX ADMIN — CARVEDILOL 12.5 MG: 12.5 TABLET, FILM COATED ORAL at 10:05

## 2022-10-17 RX ADMIN — INSULIN GLARGINE-YFGN 34 UNITS: 100 INJECTION, SOLUTION SUBCUTANEOUS at 21:38

## 2022-10-17 RX ADMIN — MONTELUKAST SODIUM 10 MG: 10 TABLET, FILM COATED ORAL at 21:20

## 2022-10-17 RX ADMIN — CARVEDILOL 12.5 MG: 12.5 TABLET, FILM COATED ORAL at 21:58

## 2022-10-17 RX ADMIN — AMLODIPINE BESYLATE 10 MG: 10 TABLET ORAL at 08:49

## 2022-10-17 RX ADMIN — HYDROCODONE BITARTRATE AND ACETAMINOPHEN 1 TABLET: 10; 325 TABLET ORAL at 14:00

## 2022-10-17 RX ADMIN — GABAPENTIN 400 MG: 400 CAPSULE ORAL at 21:21

## 2022-10-17 RX ADMIN — HYDROCODONE BITARTRATE AND ACETAMINOPHEN 1 TABLET: 10; 325 TABLET ORAL at 02:44

## 2022-10-17 RX ADMIN — Medication 1000 UNITS: at 08:49

## 2022-10-17 RX ADMIN — HYDRALAZINE HYDROCHLORIDE 75 MG: 50 TABLET, FILM COATED ORAL at 15:01

## 2022-10-17 RX ADMIN — HYDROCODONE BITARTRATE AND ACETAMINOPHEN 1 TABLET: 10; 325 TABLET ORAL at 20:03

## 2022-10-17 RX ADMIN — SODIUM BICARBONATE 650 MG: 650 TABLET ORAL at 08:49

## 2022-10-17 RX ADMIN — ENOXAPARIN SODIUM 30 MG: 30 INJECTION SUBCUTANEOUS at 21:20

## 2022-10-17 RX ADMIN — PREDNISONE 10 MG: 10 TABLET ORAL at 08:49

## 2022-10-17 RX ADMIN — CETIRIZINE HYDROCHLORIDE 10 MG: 10 TABLET ORAL at 08:49

## 2022-10-17 RX ADMIN — SODIUM BICARBONATE 650 MG: 650 TABLET ORAL at 21:20

## 2022-10-17 RX ADMIN — METHOCARBAMOL TABLETS 750 MG: 750 TABLET, COATED ORAL at 11:51

## 2022-10-17 RX ADMIN — LORAZEPAM 0.5 MG: 0.5 TABLET ORAL at 21:26

## 2022-10-17 RX ADMIN — GABAPENTIN 400 MG: 400 CAPSULE ORAL at 08:50

## 2022-10-17 RX ADMIN — TAMSULOSIN HYDROCHLORIDE 0.4 MG: 0.4 CAPSULE ORAL at 08:49

## 2022-10-17 RX ADMIN — METHOCARBAMOL TABLETS 750 MG: 750 TABLET, COATED ORAL at 23:34

## 2022-10-17 RX ADMIN — POLYETHYLENE GLYCOL 3350 17 G: 17 POWDER, FOR SOLUTION ORAL at 08:50

## 2022-10-17 RX ADMIN — HYDRALAZINE HYDROCHLORIDE 75 MG: 50 TABLET, FILM COATED ORAL at 06:11

## 2022-10-17 RX ADMIN — DOCUSATE SODIUM 50MG AND SENNOSIDES 8.6MG 1 TABLET: 8.6; 5 TABLET, FILM COATED ORAL at 08:50

## 2022-10-17 RX ADMIN — ONDANSETRON 4 MG: 2 INJECTION INTRAMUSCULAR; INTRAVENOUS at 00:07

## 2022-10-17 RX ADMIN — FAMOTIDINE 10 MG: 20 TABLET ORAL at 08:48

## 2022-10-17 RX ADMIN — FAMOTIDINE 10 MG: 20 TABLET ORAL at 21:26

## 2022-10-17 RX ADMIN — INSULIN LISPRO 4 UNITS: 100 INJECTION, SOLUTION INTRAVENOUS; SUBCUTANEOUS at 17:22

## 2022-10-17 RX ADMIN — HYDROCODONE BITARTRATE AND ACETAMINOPHEN 1 TABLET: 10; 325 TABLET ORAL at 08:48

## 2022-10-17 RX ADMIN — FUROSEMIDE 20 MG: 20 TABLET ORAL at 08:49

## 2022-10-17 RX ADMIN — HYDROMORPHONE HYDROCHLORIDE 0.5 MG: 1 INJECTION, SOLUTION INTRAMUSCULAR; INTRAVENOUS; SUBCUTANEOUS at 10:18

## 2022-10-18 LAB
ANION GAP SERPL CALCULATED.3IONS-SCNC: 14.2 MMOL/L (ref 5–15)
BUN SERPL-MCNC: 29 MG/DL (ref 8–23)
BUN/CREAT SERPL: 17 (ref 7–25)
CALCIUM SPEC-SCNC: 8.9 MG/DL (ref 8.2–9.6)
CHLORIDE SERPL-SCNC: 103 MMOL/L (ref 98–107)
CO2 SERPL-SCNC: 21.8 MMOL/L (ref 22–29)
CREAT SERPL-MCNC: 1.71 MG/DL (ref 0.57–1)
DEPRECATED RDW RBC AUTO: 48.5 FL (ref 37–54)
EGFRCR SERPLBLD CKD-EPI 2021: 28 ML/MIN/1.73
ERYTHROCYTE [DISTWIDTH] IN BLOOD BY AUTOMATED COUNT: 14.8 % (ref 12.3–15.4)
GLUCOSE BLDC GLUCOMTR-MCNC: 135 MG/DL (ref 70–130)
GLUCOSE BLDC GLUCOMTR-MCNC: 223 MG/DL (ref 70–130)
GLUCOSE BLDC GLUCOMTR-MCNC: 246 MG/DL (ref 70–130)
GLUCOSE BLDC GLUCOMTR-MCNC: 289 MG/DL (ref 70–130)
GLUCOSE SERPL-MCNC: 180 MG/DL (ref 65–99)
HCT VFR BLD AUTO: 37.4 % (ref 34–46.6)
HGB BLD-MCNC: 11.6 G/DL (ref 12–15.9)
MCH RBC QN AUTO: 28 PG (ref 26.6–33)
MCHC RBC AUTO-ENTMCNC: 31 G/DL (ref 31.5–35.7)
MCV RBC AUTO: 90.1 FL (ref 79–97)
PLATELET # BLD AUTO: 56 10*3/MM3 (ref 140–450)
PMV BLD AUTO: 11 FL (ref 6–12)
POTASSIUM SERPL-SCNC: 4 MMOL/L (ref 3.5–5.2)
RBC # BLD AUTO: 4.15 10*6/MM3 (ref 3.77–5.28)
SODIUM SERPL-SCNC: 139 MMOL/L (ref 136–145)
WBC NRBC COR # BLD: 6.05 10*3/MM3 (ref 3.4–10.8)

## 2022-10-18 PROCEDURE — 82962 GLUCOSE BLOOD TEST: CPT

## 2022-10-18 PROCEDURE — 97530 THERAPEUTIC ACTIVITIES: CPT

## 2022-10-18 PROCEDURE — 85027 COMPLETE CBC AUTOMATED: CPT | Performed by: HOSPITALIST

## 2022-10-18 PROCEDURE — 94760 N-INVAS EAR/PLS OXIMETRY 1: CPT

## 2022-10-18 PROCEDURE — 94761 N-INVAS EAR/PLS OXIMETRY MLT: CPT

## 2022-10-18 PROCEDURE — 25010000002 METHYLPREDNISOLONE PER 40 MG: Performed by: HOSPITALIST

## 2022-10-18 PROCEDURE — 63710000001 INSULIN LISPRO (HUMAN) PER 5 UNITS: Performed by: INTERNAL MEDICINE

## 2022-10-18 PROCEDURE — 99232 SBSQ HOSP IP/OBS MODERATE 35: CPT | Performed by: NURSE PRACTITIONER

## 2022-10-18 PROCEDURE — 25010000002 HYDROMORPHONE PER 4 MG: Performed by: INTERNAL MEDICINE

## 2022-10-18 PROCEDURE — 94799 UNLISTED PULMONARY SVC/PX: CPT

## 2022-10-18 PROCEDURE — 63710000001 PREDNISONE PER 5 MG: Performed by: INTERNAL MEDICINE

## 2022-10-18 PROCEDURE — 94664 DEMO&/EVAL PT USE INHALER: CPT

## 2022-10-18 PROCEDURE — 80048 BASIC METABOLIC PNL TOTAL CA: CPT | Performed by: HOSPITALIST

## 2022-10-18 PROCEDURE — 25010000002 ENOXAPARIN PER 10 MG: Performed by: HOSPITALIST

## 2022-10-18 PROCEDURE — 63710000001 INSULIN LISPRO (HUMAN) PER 5 UNITS: Performed by: HOSPITALIST

## 2022-10-18 RX ORDER — LIDOCAINE 50 MG/G
1 PATCH TOPICAL
Status: DISCONTINUED | OUTPATIENT
Start: 2022-10-18 | End: 2022-10-20 | Stop reason: HOSPADM

## 2022-10-18 RX ORDER — METHYLPREDNISOLONE SODIUM SUCCINATE 40 MG/ML
40 INJECTION, POWDER, LYOPHILIZED, FOR SOLUTION INTRAMUSCULAR; INTRAVENOUS EVERY 12 HOURS
Status: DISCONTINUED | OUTPATIENT
Start: 2022-10-18 | End: 2022-10-20 | Stop reason: HOSPADM

## 2022-10-18 RX ORDER — HYDROCODONE BITARTRATE AND ACETAMINOPHEN 10; 325 MG/1; MG/1
1 TABLET ORAL EVERY 4 HOURS PRN
Status: DISCONTINUED | OUTPATIENT
Start: 2022-10-18 | End: 2022-10-20 | Stop reason: HOSPADM

## 2022-10-18 RX ORDER — INSULIN LISPRO 100 [IU]/ML
5 INJECTION, SOLUTION INTRAVENOUS; SUBCUTANEOUS
Status: DISCONTINUED | OUTPATIENT
Start: 2022-10-18 | End: 2022-10-20 | Stop reason: HOSPADM

## 2022-10-18 RX ORDER — GABAPENTIN 300 MG/1
600 CAPSULE ORAL 2 TIMES DAILY
Status: DISCONTINUED | OUTPATIENT
Start: 2022-10-18 | End: 2022-10-20 | Stop reason: HOSPADM

## 2022-10-18 RX ORDER — HYDROCODONE BITARTRATE AND ACETAMINOPHEN 10; 325 MG/1; MG/1
1 TABLET ORAL EVERY 6 HOURS
Status: DISCONTINUED | OUTPATIENT
Start: 2022-10-18 | End: 2022-10-20 | Stop reason: HOSPADM

## 2022-10-18 RX ADMIN — Medication 1000 UNITS: at 08:47

## 2022-10-18 RX ADMIN — METHOCARBAMOL TABLETS 750 MG: 750 TABLET, COATED ORAL at 12:13

## 2022-10-18 RX ADMIN — HYDROCODONE BITARTRATE AND ACETAMINOPHEN 1 TABLET: 10; 325 TABLET ORAL at 01:52

## 2022-10-18 RX ADMIN — SODIUM BICARBONATE 650 MG: 650 TABLET ORAL at 20:41

## 2022-10-18 RX ADMIN — HYDROMORPHONE HYDROCHLORIDE 0.5 MG: 1 INJECTION, SOLUTION INTRAMUSCULAR; INTRAVENOUS; SUBCUTANEOUS at 12:26

## 2022-10-18 RX ADMIN — GABAPENTIN 400 MG: 400 CAPSULE ORAL at 08:46

## 2022-10-18 RX ADMIN — CETIRIZINE HYDROCHLORIDE 10 MG: 10 TABLET ORAL at 08:47

## 2022-10-18 RX ADMIN — FAMOTIDINE 10 MG: 20 TABLET ORAL at 08:57

## 2022-10-18 RX ADMIN — METHOCARBAMOL TABLETS 750 MG: 750 TABLET, COATED ORAL at 05:21

## 2022-10-18 RX ADMIN — HYDRALAZINE HYDROCHLORIDE 75 MG: 50 TABLET, FILM COATED ORAL at 22:13

## 2022-10-18 RX ADMIN — HYDROCODONE BITARTRATE AND ACETAMINOPHEN 1 TABLET: 10; 325 TABLET ORAL at 15:22

## 2022-10-18 RX ADMIN — INSULIN LISPRO 4 UNITS: 100 INJECTION, SOLUTION INTRAVENOUS; SUBCUTANEOUS at 12:13

## 2022-10-18 RX ADMIN — SODIUM BICARBONATE 650 MG: 650 TABLET ORAL at 08:47

## 2022-10-18 RX ADMIN — HYDRALAZINE HYDROCHLORIDE 75 MG: 50 TABLET, FILM COATED ORAL at 15:23

## 2022-10-18 RX ADMIN — HYDRALAZINE HYDROCHLORIDE 75 MG: 50 TABLET, FILM COATED ORAL at 05:21

## 2022-10-18 RX ADMIN — HYDROCODONE BITARTRATE AND ACETAMINOPHEN 1 TABLET: 10; 325 TABLET ORAL at 08:47

## 2022-10-18 RX ADMIN — ENOXAPARIN SODIUM 30 MG: 30 INJECTION SUBCUTANEOUS at 20:41

## 2022-10-18 RX ADMIN — INSULIN LISPRO 5 UNITS: 100 INJECTION, SOLUTION INTRAVENOUS; SUBCUTANEOUS at 17:53

## 2022-10-18 RX ADMIN — BUDESONIDE AND FORMOTEROL FUMARATE DIHYDRATE 2 PUFF: 160; 4.5 AEROSOL RESPIRATORY (INHALATION) at 10:35

## 2022-10-18 RX ADMIN — CARVEDILOL 12.5 MG: 12.5 TABLET, FILM COATED ORAL at 20:41

## 2022-10-18 RX ADMIN — BUDESONIDE AND FORMOTEROL FUMARATE DIHYDRATE 2 PUFF: 160; 4.5 AEROSOL RESPIRATORY (INHALATION) at 20:37

## 2022-10-18 RX ADMIN — PREDNISONE 10 MG: 10 TABLET ORAL at 08:47

## 2022-10-18 RX ADMIN — INSULIN GLARGINE-YFGN 40 UNITS: 100 INJECTION, SOLUTION SUBCUTANEOUS at 22:12

## 2022-10-18 RX ADMIN — LIDOCAINE 5% 1 PATCH: 700 PATCH TOPICAL at 15:22

## 2022-10-18 RX ADMIN — AMLODIPINE BESYLATE 10 MG: 10 TABLET ORAL at 08:47

## 2022-10-18 RX ADMIN — FUROSEMIDE 20 MG: 20 TABLET ORAL at 08:47

## 2022-10-18 RX ADMIN — HYDROCODONE BITARTRATE AND ACETAMINOPHEN 1 TABLET: 10; 325 TABLET ORAL at 20:41

## 2022-10-18 RX ADMIN — METHOCARBAMOL TABLETS 750 MG: 750 TABLET, COATED ORAL at 17:54

## 2022-10-18 RX ADMIN — HYDROMORPHONE HYDROCHLORIDE 0.5 MG: 1 INJECTION, SOLUTION INTRAMUSCULAR; INTRAVENOUS; SUBCUTANEOUS at 17:21

## 2022-10-18 RX ADMIN — METHYLPREDNISOLONE SODIUM SUCCINATE 40 MG: 40 INJECTION, POWDER, FOR SOLUTION INTRAMUSCULAR; INTRAVENOUS at 17:54

## 2022-10-18 RX ADMIN — FAMOTIDINE 10 MG: 20 TABLET ORAL at 20:41

## 2022-10-18 RX ADMIN — TAMSULOSIN HYDROCHLORIDE 0.4 MG: 0.4 CAPSULE ORAL at 08:47

## 2022-10-18 RX ADMIN — GABAPENTIN 600 MG: 300 CAPSULE ORAL at 20:40

## 2022-10-18 RX ADMIN — POLYETHYLENE GLYCOL 3350 17 G: 17 POWDER, FOR SOLUTION ORAL at 08:47

## 2022-10-18 RX ADMIN — CARVEDILOL 12.5 MG: 12.5 TABLET, FILM COATED ORAL at 08:47

## 2022-10-18 RX ADMIN — INSULIN LISPRO 4 UNITS: 100 INJECTION, SOLUTION INTRAVENOUS; SUBCUTANEOUS at 17:53

## 2022-10-18 RX ADMIN — DOCUSATE SODIUM 50MG AND SENNOSIDES 8.6MG 1 TABLET: 8.6; 5 TABLET, FILM COATED ORAL at 08:47

## 2022-10-18 RX ADMIN — LEVOTHYROXINE SODIUM 25 MCG: 0.03 TABLET ORAL at 05:21

## 2022-10-18 RX ADMIN — MONTELUKAST SODIUM 10 MG: 10 TABLET, FILM COATED ORAL at 20:41

## 2022-10-19 ENCOUNTER — ANESTHESIA (OUTPATIENT)
Dept: ORTHOPEDIC SURGERY | Facility: HOSPITAL | Age: 87
End: 2022-10-19

## 2022-10-19 ENCOUNTER — ANESTHESIA EVENT (OUTPATIENT)
Dept: ORTHOPEDIC SURGERY | Facility: HOSPITAL | Age: 87
End: 2022-10-19

## 2022-10-19 LAB
GLUCOSE BLDC GLUCOMTR-MCNC: 285 MG/DL (ref 70–130)
GLUCOSE BLDC GLUCOMTR-MCNC: 292 MG/DL (ref 70–130)
GLUCOSE BLDC GLUCOMTR-MCNC: 310 MG/DL (ref 70–130)
GLUCOSE BLDC GLUCOMTR-MCNC: 351 MG/DL (ref 70–130)

## 2022-10-19 PROCEDURE — 94664 DEMO&/EVAL PT USE INHALER: CPT

## 2022-10-19 PROCEDURE — 94799 UNLISTED PULMONARY SVC/PX: CPT

## 2022-10-19 PROCEDURE — 97530 THERAPEUTIC ACTIVITIES: CPT

## 2022-10-19 PROCEDURE — 82962 GLUCOSE BLOOD TEST: CPT

## 2022-10-19 PROCEDURE — 97535 SELF CARE MNGMENT TRAINING: CPT

## 2022-10-19 PROCEDURE — 97116 GAIT TRAINING THERAPY: CPT

## 2022-10-19 PROCEDURE — 25010000002 METHYLPREDNISOLONE PER 40 MG: Performed by: HOSPITALIST

## 2022-10-19 PROCEDURE — 25010000002 ENOXAPARIN PER 10 MG: Performed by: HOSPITALIST

## 2022-10-19 PROCEDURE — 63710000001 INSULIN LISPRO (HUMAN) PER 5 UNITS: Performed by: HOSPITALIST

## 2022-10-19 PROCEDURE — 63710000001 INSULIN LISPRO (HUMAN) PER 5 UNITS: Performed by: INTERNAL MEDICINE

## 2022-10-19 RX ADMIN — ENOXAPARIN SODIUM 30 MG: 30 INJECTION SUBCUTANEOUS at 21:25

## 2022-10-19 RX ADMIN — METHYLPREDNISOLONE SODIUM SUCCINATE 40 MG: 40 INJECTION, POWDER, FOR SOLUTION INTRAMUSCULAR; INTRAVENOUS at 03:09

## 2022-10-19 RX ADMIN — HYDROCODONE BITARTRATE AND ACETAMINOPHEN 1 TABLET: 10; 325 TABLET ORAL at 03:09

## 2022-10-19 RX ADMIN — SODIUM BICARBONATE 650 MG: 650 TABLET ORAL at 08:49

## 2022-10-19 RX ADMIN — METHOCARBAMOL TABLETS 750 MG: 750 TABLET, COATED ORAL at 17:26

## 2022-10-19 RX ADMIN — TAMSULOSIN HYDROCHLORIDE 0.4 MG: 0.4 CAPSULE ORAL at 08:49

## 2022-10-19 RX ADMIN — METHYLPREDNISOLONE SODIUM SUCCINATE 40 MG: 40 INJECTION, POWDER, FOR SOLUTION INTRAMUSCULAR; INTRAVENOUS at 15:07

## 2022-10-19 RX ADMIN — LEVOTHYROXINE SODIUM 25 MCG: 0.03 TABLET ORAL at 06:29

## 2022-10-19 RX ADMIN — HYDRALAZINE HYDROCHLORIDE 75 MG: 50 TABLET, FILM COATED ORAL at 15:06

## 2022-10-19 RX ADMIN — GABAPENTIN 600 MG: 300 CAPSULE ORAL at 08:50

## 2022-10-19 RX ADMIN — SODIUM BICARBONATE 650 MG: 650 TABLET ORAL at 21:24

## 2022-10-19 RX ADMIN — FUROSEMIDE 20 MG: 20 TABLET ORAL at 08:49

## 2022-10-19 RX ADMIN — LIDOCAINE 5% 1 PATCH: 700 PATCH TOPICAL at 08:51

## 2022-10-19 RX ADMIN — HYDRALAZINE HYDROCHLORIDE 75 MG: 50 TABLET, FILM COATED ORAL at 06:29

## 2022-10-19 RX ADMIN — FAMOTIDINE 10 MG: 20 TABLET ORAL at 21:24

## 2022-10-19 RX ADMIN — INSULIN LISPRO 8 UNITS: 100 INJECTION, SOLUTION INTRAVENOUS; SUBCUTANEOUS at 11:59

## 2022-10-19 RX ADMIN — AMLODIPINE BESYLATE 10 MG: 10 TABLET ORAL at 08:50

## 2022-10-19 RX ADMIN — HYDRALAZINE HYDROCHLORIDE 75 MG: 50 TABLET, FILM COATED ORAL at 21:27

## 2022-10-19 RX ADMIN — CARVEDILOL 12.5 MG: 12.5 TABLET, FILM COATED ORAL at 21:24

## 2022-10-19 RX ADMIN — DOCUSATE SODIUM 50MG AND SENNOSIDES 8.6MG 1 TABLET: 8.6; 5 TABLET, FILM COATED ORAL at 10:07

## 2022-10-19 RX ADMIN — INSULIN LISPRO 5 UNITS: 100 INJECTION, SOLUTION INTRAVENOUS; SUBCUTANEOUS at 17:26

## 2022-10-19 RX ADMIN — METHOCARBAMOL TABLETS 750 MG: 750 TABLET, COATED ORAL at 06:29

## 2022-10-19 RX ADMIN — CETIRIZINE HYDROCHLORIDE 10 MG: 10 TABLET ORAL at 08:50

## 2022-10-19 RX ADMIN — HYDROCODONE BITARTRATE AND ACETAMINOPHEN 1 TABLET: 10; 325 TABLET ORAL at 08:49

## 2022-10-19 RX ADMIN — INSULIN LISPRO 6 UNITS: 100 INJECTION, SOLUTION INTRAVENOUS; SUBCUTANEOUS at 17:26

## 2022-10-19 RX ADMIN — GABAPENTIN 600 MG: 300 CAPSULE ORAL at 21:24

## 2022-10-19 RX ADMIN — FAMOTIDINE 10 MG: 20 TABLET ORAL at 08:49

## 2022-10-19 RX ADMIN — INSULIN LISPRO 5 UNITS: 100 INJECTION, SOLUTION INTRAVENOUS; SUBCUTANEOUS at 08:52

## 2022-10-19 RX ADMIN — MONTELUKAST SODIUM 10 MG: 10 TABLET, FILM COATED ORAL at 21:24

## 2022-10-19 RX ADMIN — INSULIN GLARGINE-YFGN 60 UNITS: 100 INJECTION, SOLUTION SUBCUTANEOUS at 21:35

## 2022-10-19 RX ADMIN — INSULIN LISPRO 5 UNITS: 100 INJECTION, SOLUTION INTRAVENOUS; SUBCUTANEOUS at 12:00

## 2022-10-19 RX ADMIN — HYDROCODONE BITARTRATE AND ACETAMINOPHEN 1 TABLET: 10; 325 TABLET ORAL at 21:24

## 2022-10-19 RX ADMIN — BUDESONIDE AND FORMOTEROL FUMARATE DIHYDRATE 2 PUFF: 160; 4.5 AEROSOL RESPIRATORY (INHALATION) at 09:07

## 2022-10-19 RX ADMIN — BUDESONIDE AND FORMOTEROL FUMARATE DIHYDRATE 2 PUFF: 160; 4.5 AEROSOL RESPIRATORY (INHALATION) at 20:20

## 2022-10-19 RX ADMIN — INSULIN LISPRO 7 UNITS: 100 INJECTION, SOLUTION INTRAVENOUS; SUBCUTANEOUS at 08:53

## 2022-10-19 RX ADMIN — METHOCARBAMOL TABLETS 750 MG: 750 TABLET, COATED ORAL at 12:00

## 2022-10-19 RX ADMIN — METHOCARBAMOL TABLETS 750 MG: 750 TABLET, COATED ORAL at 00:09

## 2022-10-19 RX ADMIN — CARVEDILOL 12.5 MG: 12.5 TABLET, FILM COATED ORAL at 08:50

## 2022-10-19 RX ADMIN — Medication 1000 UNITS: at 08:49

## 2022-10-19 RX ADMIN — HYDROCODONE BITARTRATE AND ACETAMINOPHEN 1 TABLET: 10; 325 TABLET ORAL at 15:07

## 2022-10-20 ENCOUNTER — TELEPHONE (OUTPATIENT)
Dept: NEUROSURGERY | Facility: CLINIC | Age: 87
End: 2022-10-20

## 2022-10-20 ENCOUNTER — NURSE TRIAGE (OUTPATIENT)
Dept: CALL CENTER | Facility: HOSPITAL | Age: 87
End: 2022-10-20

## 2022-10-20 ENCOUNTER — READMISSION MANAGEMENT (OUTPATIENT)
Dept: CALL CENTER | Facility: HOSPITAL | Age: 87
End: 2022-10-20

## 2022-10-20 VITALS
RESPIRATION RATE: 18 BRPM | TEMPERATURE: 98.2 F | WEIGHT: 155 LBS | HEART RATE: 55 BPM | DIASTOLIC BLOOD PRESSURE: 60 MMHG | OXYGEN SATURATION: 96 % | BODY MASS INDEX: 33.44 KG/M2 | SYSTOLIC BLOOD PRESSURE: 142 MMHG | HEIGHT: 57 IN

## 2022-10-20 DIAGNOSIS — S32.059A CLOSED FRACTURE OF FIFTH LUMBAR VERTEBRA, UNSPECIFIED FRACTURE MORPHOLOGY, INITIAL ENCOUNTER: Primary | ICD-10-CM

## 2022-10-20 DIAGNOSIS — S32.000A LUMBAR COMPRESSION FRACTURE, CLOSED, INITIAL ENCOUNTER: ICD-10-CM

## 2022-10-20 LAB
ANION GAP SERPL CALCULATED.3IONS-SCNC: 14.3 MMOL/L (ref 5–15)
BASOPHILS # BLD AUTO: 0 10*3/MM3 (ref 0–0.2)
BASOPHILS NFR BLD AUTO: 0 % (ref 0–1.5)
BUN SERPL-MCNC: 45 MG/DL (ref 8–23)
BUN/CREAT SERPL: 27.6 (ref 7–25)
CALCIUM SPEC-SCNC: 8.7 MG/DL (ref 8.2–9.6)
CHLORIDE SERPL-SCNC: 95 MMOL/L (ref 98–107)
CO2 SERPL-SCNC: 20.7 MMOL/L (ref 22–29)
CREAT SERPL-MCNC: 1.63 MG/DL (ref 0.57–1)
DEPRECATED RDW RBC AUTO: 45.9 FL (ref 37–54)
EGFRCR SERPLBLD CKD-EPI 2021: 29.7 ML/MIN/1.73
EOSINOPHIL # BLD AUTO: 0 10*3/MM3 (ref 0–0.4)
EOSINOPHIL NFR BLD AUTO: 0 % (ref 0.3–6.2)
ERYTHROCYTE [DISTWIDTH] IN BLOOD BY AUTOMATED COUNT: 14.8 % (ref 12.3–15.4)
GLUCOSE BLDC GLUCOMTR-MCNC: 237 MG/DL (ref 70–130)
GLUCOSE BLDC GLUCOMTR-MCNC: 313 MG/DL (ref 70–130)
GLUCOSE SERPL-MCNC: 251 MG/DL (ref 65–99)
HCT VFR BLD AUTO: 33.5 % (ref 34–46.6)
HGB BLD-MCNC: 11.1 G/DL (ref 12–15.9)
IMM GRANULOCYTES # BLD AUTO: 0.04 10*3/MM3 (ref 0–0.05)
IMM GRANULOCYTES NFR BLD AUTO: 0.5 % (ref 0–0.5)
LYMPHOCYTES # BLD AUTO: 0.87 10*3/MM3 (ref 0.7–3.1)
LYMPHOCYTES NFR BLD AUTO: 10.8 % (ref 19.6–45.3)
MCH RBC QN AUTO: 28.7 PG (ref 26.6–33)
MCHC RBC AUTO-ENTMCNC: 33.1 G/DL (ref 31.5–35.7)
MCV RBC AUTO: 86.6 FL (ref 79–97)
MONOCYTES # BLD AUTO: 0.23 10*3/MM3 (ref 0.1–0.9)
MONOCYTES NFR BLD AUTO: 2.8 % (ref 5–12)
NEUTROPHILS NFR BLD AUTO: 6.95 10*3/MM3 (ref 1.7–7)
NEUTROPHILS NFR BLD AUTO: 85.9 % (ref 42.7–76)
NRBC BLD AUTO-RTO: 0 /100 WBC (ref 0–0.2)
PLATELET # BLD AUTO: 69 10*3/MM3 (ref 140–450)
PMV BLD AUTO: 11.7 FL (ref 6–12)
POTASSIUM SERPL-SCNC: 4.5 MMOL/L (ref 3.5–5.2)
RBC # BLD AUTO: 3.87 10*6/MM3 (ref 3.77–5.28)
SODIUM SERPL-SCNC: 130 MMOL/L (ref 136–145)
WBC NRBC COR # BLD: 8.09 10*3/MM3 (ref 3.4–10.8)

## 2022-10-20 PROCEDURE — 85025 COMPLETE CBC W/AUTO DIFF WBC: CPT | Performed by: INTERNAL MEDICINE

## 2022-10-20 PROCEDURE — 63710000001 INSULIN LISPRO (HUMAN) PER 5 UNITS: Performed by: HOSPITALIST

## 2022-10-20 PROCEDURE — 94664 DEMO&/EVAL PT USE INHALER: CPT

## 2022-10-20 PROCEDURE — 82962 GLUCOSE BLOOD TEST: CPT

## 2022-10-20 PROCEDURE — 94799 UNLISTED PULMONARY SVC/PX: CPT

## 2022-10-20 PROCEDURE — 63710000001 INSULIN LISPRO (HUMAN) PER 5 UNITS: Performed by: INTERNAL MEDICINE

## 2022-10-20 PROCEDURE — 80048 BASIC METABOLIC PNL TOTAL CA: CPT | Performed by: INTERNAL MEDICINE

## 2022-10-20 PROCEDURE — 25010000002 METHYLPREDNISOLONE PER 40 MG: Performed by: HOSPITALIST

## 2022-10-20 PROCEDURE — 94761 N-INVAS EAR/PLS OXIMETRY MLT: CPT

## 2022-10-20 RX ORDER — HYDROCODONE BITARTRATE AND ACETAMINOPHEN 7.5; 325 MG/1; MG/1
1 TABLET ORAL EVERY 6 HOURS PRN
Qty: 40 TABLET | Refills: 0 | Status: SHIPPED | OUTPATIENT
Start: 2022-10-20 | End: 2022-10-30

## 2022-10-20 RX ORDER — LIDOCAINE 50 MG/G
1 PATCH TOPICAL EVERY 24 HOURS
Qty: 15 PATCH | Refills: 0 | Status: SHIPPED | OUTPATIENT
Start: 2022-10-20 | End: 2022-11-04

## 2022-10-20 RX ADMIN — CARVEDILOL 12.5 MG: 12.5 TABLET, FILM COATED ORAL at 09:00

## 2022-10-20 RX ADMIN — CETIRIZINE HYDROCHLORIDE 10 MG: 10 TABLET ORAL at 08:59

## 2022-10-20 RX ADMIN — METHOCARBAMOL TABLETS 750 MG: 750 TABLET, COATED ORAL at 12:31

## 2022-10-20 RX ADMIN — HYDRALAZINE HYDROCHLORIDE 75 MG: 50 TABLET, FILM COATED ORAL at 06:03

## 2022-10-20 RX ADMIN — LIDOCAINE 5% 1 PATCH: 700 PATCH TOPICAL at 08:57

## 2022-10-20 RX ADMIN — INSULIN LISPRO 5 UNITS: 100 INJECTION, SOLUTION INTRAVENOUS; SUBCUTANEOUS at 12:32

## 2022-10-20 RX ADMIN — HYDROCODONE BITARTRATE AND ACETAMINOPHEN 1 TABLET: 10; 325 TABLET ORAL at 00:31

## 2022-10-20 RX ADMIN — METHOCARBAMOL TABLETS 750 MG: 750 TABLET, COATED ORAL at 06:03

## 2022-10-20 RX ADMIN — INSULIN LISPRO 5 UNITS: 100 INJECTION, SOLUTION INTRAVENOUS; SUBCUTANEOUS at 09:07

## 2022-10-20 RX ADMIN — BUDESONIDE AND FORMOTEROL FUMARATE DIHYDRATE 2 PUFF: 160; 4.5 AEROSOL RESPIRATORY (INHALATION) at 07:19

## 2022-10-20 RX ADMIN — FUROSEMIDE 20 MG: 20 TABLET ORAL at 08:58

## 2022-10-20 RX ADMIN — DOCUSATE SODIUM 50MG AND SENNOSIDES 8.6MG 1 TABLET: 8.6; 5 TABLET, FILM COATED ORAL at 08:57

## 2022-10-20 RX ADMIN — GABAPENTIN 600 MG: 300 CAPSULE ORAL at 08:58

## 2022-10-20 RX ADMIN — METHYLPREDNISOLONE SODIUM SUCCINATE 40 MG: 40 INJECTION, POWDER, FOR SOLUTION INTRAMUSCULAR; INTRAVENOUS at 02:55

## 2022-10-20 RX ADMIN — INSULIN LISPRO 4 UNITS: 100 INJECTION, SOLUTION INTRAVENOUS; SUBCUTANEOUS at 09:06

## 2022-10-20 RX ADMIN — Medication 1000 UNITS: at 08:59

## 2022-10-20 RX ADMIN — LEVOTHYROXINE SODIUM 25 MCG: 0.03 TABLET ORAL at 06:03

## 2022-10-20 RX ADMIN — METHOCARBAMOL TABLETS 750 MG: 750 TABLET, COATED ORAL at 00:31

## 2022-10-20 RX ADMIN — POLYETHYLENE GLYCOL 3350 17 G: 17 POWDER, FOR SOLUTION ORAL at 09:07

## 2022-10-20 RX ADMIN — TAMSULOSIN HYDROCHLORIDE 0.4 MG: 0.4 CAPSULE ORAL at 08:57

## 2022-10-20 RX ADMIN — INSULIN LISPRO 7 UNITS: 100 INJECTION, SOLUTION INTRAVENOUS; SUBCUTANEOUS at 12:32

## 2022-10-20 RX ADMIN — AMLODIPINE BESYLATE 10 MG: 10 TABLET ORAL at 08:59

## 2022-10-20 RX ADMIN — HYDROCODONE BITARTRATE AND ACETAMINOPHEN 1 TABLET: 10; 325 TABLET ORAL at 08:58

## 2022-10-20 RX ADMIN — FAMOTIDINE 10 MG: 20 TABLET ORAL at 08:59

## 2022-10-20 NOTE — TELEPHONE ENCOUNTER
She was discharged from [facility] on 10/20/22 with Closed fracture of the fifth vertebrae. She was receiving 600 mg of Gabapentin in the hospital, her home script is 400 mg- 2 times a day. She is needing a script for the 600 mg twice daily. Her script are capsules. A message will be sent to  regarding this matter. Her PCP is not an option he is out of town.     Reason for Disposition  • Prescription refill request for a CONTROLLED substance (e.g., narcotics, ADHD medicines)    Additional Information  • Negative: [1] Intentional drug overdose AND [2] suicidal thoughts or ideas  • Negative: Drug overdose and triager unable to answer question  • Negative: Caller requesting information unrelated to medicine  • Negative: Caller requesting information about COVID-19 Vaccine  • Negative: Caller requesting information about Emergency Contraception  • Negative: Caller requesting information about Combined Birth Control Pills  • Negative: Caller requesting information about Progestin Birth Control Pills  • Negative: Caller requesting information about Post-Op pain or medicines  • Negative: Caller requesting a prescription antibiotic (such as Penicillin) for Strep throat and has a positive culture result  • Negative: Caller requesting a prescription anti-viral med (such as Tamiflu) and has influenza (flu)  symptoms  • Negative: Immunization reaction suspected  • Negative: Rash while taking a medicine or within 3 days of stopping it  • Negative: [1] Asthma and [2] having symptoms of asthma (cough, wheezing, etc.)  • Negative: [1] Symptom of illness (e.g., headache, abdominal pain, earache, vomiting) AND [2] more than mild  • Negative: Breastfeeding questions about mother's medicines and diet  • Negative: MORE THAN A DOUBLE DOSE of a prescription or over-the-counter (OTC) drug  • Negative: [1] DOUBLE DOSE (an extra dose or lesser amount) of prescription drug AND [2] any symptoms (e.g., dizziness, nausea, pain, sleepiness)  •  "Negative: [1] DOUBLE DOSE (an extra dose or lesser amount) of over-the-counter (OTC) drug AND [2] any symptoms (e.g., dizziness, nausea, pain, sleepiness)  • Negative: Took another person's prescription drug  • Negative: [1] DOUBLE DOSE (an extra dose or lesser amount) of prescription drug AND [2] NO symptoms (Exception: a double dose of antibiotics)  • Negative: Diabetes drug error or overdose (e.g., took wrong type of insulin or took extra dose)  • Negative: [1] Prescription refill request for ESSENTIAL medicine (i.e., likelihood of harm to patient if not taken) AND [2] triager unable to refill per department policy  • Negative: [1] Prescription not at pharmacy AND [2] was prescribed by PCP recently (Exception: triager has access to EMR and prescription is recorded there. Go to Home Care and confirm for pharmacy.)  • Negative: [1] Pharmacy calling with prescription question AND [2] triager unable to answer question  • Negative: [1] Caller has URGENT medicine question about med that PCP or specialist prescribed AND [2] triager unable to answer question  • Negative: Medicine patch causing local rash or itching  • Negative: [1] Caller has medicine question about med NOT prescribed by PCP AND [2] triager unable to answer question (e.g., compatibility with other med, storage)  • Negative: Prescription request for new medicine (not a refill)    Answer Assessment - Initial Assessment Questions  1. NAME of MEDICATION: \"What medicine are you calling about?\"      Gabapentin  2. QUESTION: \"What is your question?\" (e.g., medication refill, side effect)      Need the dose change.   3. PRESCRIBING HCP: \"Who prescribed it?\" Reason: if prescribed by specialist, call should be referred to that group.      PCp  4. SYMPTOMS: \"Do you have any symptoms?\"      none  5. SEVERITY: If symptoms are present, ask \"Are they mild, moderate or severe?\"      n  6. PREGNANCY:  \"Is there any chance that you are pregnant?\" \"When was your last " "menstrual period?\"      n    Protocols used: MEDICATION QUESTION CALL-ADULT-      "

## 2022-10-20 NOTE — TELEPHONE ENCOUNTER
Caller: Radha Hoyos    Relationship to patient: Emergency Contact    Best call back number: [phone]        Type of visit: F/U WITH IMAGES    Requested date: TELEPHONE CALL    If rescheduling, when is the original appointment: 11-2-22 WITH CINDI    Additional notes:DAUGHTER STATES PT WAS JUST RELEASED TODAY FROM HOSPITAL. SHE WOULD LIKE TO KNOW IF APPT ON 11-2-22 CHANGE BE CHANGED TO A TELEPHONE VISIT SO SHE DOES NOT HAVE TO DRAG HER MOTHER AROUND. PT STATES SHE HAD XRAYS @ AND MRI @ 10-17-22    PLEASE REVIEW AND CALL PT DAUGHTER BACK.     THANK YOU,

## 2022-10-20 NOTE — TELEPHONE ENCOUNTER
----- Message from RIGO Trent sent at 10/18/2022  3:09 PM EDT -----  Regarding: HFU  Please schedule patient for 4-week follow-up with upright lumbar spine x-ray imaging and DEXA scan.  Can be with [facility].

## 2022-10-21 ENCOUNTER — READMISSION MANAGEMENT (OUTPATIENT)
Dept: CALL CENTER | Facility: HOSPITAL | Age: 87
End: 2022-10-21

## 2022-10-21 NOTE — OUTREACH NOTE
Prep Survey    Flowsheet Row Responses   [facility] facility patient discharged from? [place]   Is LACE score < 7 ? No   Emergency Room discharge w/ pulse ox? No   Eligibility Readm Mgmt   Discharge diagnosis Closed fracture of fifth lumbar vertebra Acute deep vein thrombosis of calf  covid   Does the patient have one of the following disease processes/diagnoses(primary or secondary)? COVID-19   Does the patient have Home health ordered? Yes   What is the Home health agency?  [facility]   Is there a DME ordered? No   Prep survey completed? Yes          ANEL PINEDA - Registered Nurse

## 2022-10-21 NOTE — OUTREACH NOTE
COVID-19 Week 1 Survey    Flowsheet Row Responses   [facility] facility patient discharged from? [place]   Does the patient have one of the following disease processes/diagnoses(primary or secondary)? COVID-19   COVID-19 underlying condition? CHF   Call Number Call 1   Week 1 Call successful? No   Discharge diagnosis Closed fracture of fifth lumbar vertebra Acute deep vein thrombosis of calf  michele HILL - Registered Nurse

## 2022-10-24 ENCOUNTER — TELEPHONE (OUTPATIENT)
Dept: ONCOLOGY | Facility: CLINIC | Age: 87
End: 2022-10-24

## 2022-10-24 ENCOUNTER — READMISSION MANAGEMENT (OUTPATIENT)
Dept: CALL CENTER | Facility: HOSPITAL | Age: 87
End: 2022-10-24

## 2022-10-24 NOTE — OUTREACH NOTE
COVID-19 Week 1 Survey    Flowsheet Row Responses   [facility] patient discharged from? [place]   Does the patient have one of the following disease processes/diagnoses(primary or secondary)? COVID-19   COVID-19 underlying condition? CHF   Call Number Call 2   Week 1 Call successful? Yes   Call start time [phone]   Call end time [phone]   Discharge diagnosis Closed fracture of fifth lumbar vertebra Acute deep vein thrombosis of calf  covid   Person spoke with today (if not patient) and relationship Radha-Dtr   Meds reviewed with patient/caregiver? Yes   Is the patient having any side effects they believe may be caused by any medication additions or changes? No   Does the patient have all medications ordered at discharge? Yes   Is the patient taking all medications as directed (includes completed medication regime)? Yes   Does the patient have a primary care provider?  Yes   Comments regarding PCP 11/1/22   Does the patient have an appointment with their PCP or specialist within 7 days of discharge? Greater than 7 days   What is preventing the patient from scheduling follow up appointments within 7 days of discharge? Earlier appointment not available   Nursing Interventions Verified appointment date/time/provider   Has the patient kept scheduled appointments due by today? N/A   What is the Home health agency?  [facility]    Has home health visited the patient within 72 hours of discharge? Yes   Psychosocial issues? No   Did the patient receive a copy of their discharge instructions? Yes   Nursing interventions Reviewed instructions with patient   What is the patient's perception of their health status since discharge? Improving   Does the patient have any of the following symptoms? None  [Dtr stated pt did not have covid this admission]   Nursing Interventions Nurse provided patient education   Pulse Ox monitoring None   Is the patient/caregiver able to teach back steps to recovery at home? Set small,  achievable goals for return to baseline health   Is the patient/caregiver able to teach back the hierarchy of who to call/visit for symptoms/problems? PCP, Specialist, Home health nurse, Urgent Care, ED, 911 Yes   [place] interventions: Take daily medications   COVID-19 call completed? Yes   Wrap up additional comments Dtr reports pt had a bad pain day yesterday but is doing better today, pt did not have covid this admission. No questions/concerns at this time          STEPHEN HILL - Registered Nurse

## 2022-10-24 NOTE — TELEPHONE ENCOUNTER
GIORGIO TALKING WITH DR DIXON TO SEE IF PATIENT BLOOD HAS BEEN DRAWN AT THE FACILITY THE PT IS IN - THEY NEED TO REVIEW THE LABS

## 2022-10-24 NOTE — TELEPHONE ENCOUNTER
Returned call to Home Health nurse with approval for weekly CBC, next one is due on 10/27/2022.  She v/u.

## 2022-10-24 NOTE — TELEPHONE ENCOUNTER
Caller: JAMES FAULKNER    Relationship: [facility]    Best call back number: [phone]    What orders are you requesting (i.e. lab or imaging): CALLING TO CLARIFY IF THEY NEED TO RESUME WEEKLY CBC       Where will you receive your lab/imaging services: PLEASE RETURN CALL

## 2022-10-24 NOTE — TELEPHONE ENCOUNTER
Patient's daughter wants to know if her appointment can be a phone visit instead of her coming into the office

## 2022-10-24 NOTE — TELEPHONE ENCOUNTER
CALLED AND SPOKE WITH PT GRANDDAUGHTER ABOUT THE APPT BEING A TELEPHONE VISIT - PT IS IN A FACILITY

## 2022-10-25 NOTE — PROGRESS NOTES
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  Unfortunately she had another hospitalization in the interval since her last visit here from 10/16 - 10/20/2022.  She was experiencing fairly severe ongoing back pain from her L5 compression fracture.  CT scan on 10/16/2022 showed slightly more loss of height in the L5 fracture.  MRI of the lumbar spine on 10/17/2022 showed 20% loss of height at L5 with multiple fracture planes.  She was evaluated by neurosurgery and by pain management.  In light of her thrombocytopenia, it was felt that she was not a candidate for epidural injection.  She was taken off of her anticoagulation with Eliquis on admission.  Doppler 10/17/2022 lower extremities showed evolution of her previous bilateral acute calf DVT to subacute on the right and chronic on the left.  During her hospitalization, platelet count had declined from 98,000 on 9/14/2022 down to 66,000 on admission 10/16/2022 and remained in the mid 50-70,000 range, was 69,000 at the time of discharge on 10/20/2022.  The patient had been on prednisone 10 mg daily on admission and was discharged on 15 mg daily.  Eliquis was not resumed at discharge.  Hematology was not consulted during the hospitalization.    The patient is currently back at home.  There was discussion today with the patient on the telephone as well as with her daughters.  She is continuing with home physical therapy and home health assistance.  Home health will be back tomorrow to evaluate further.  She is continuing on gabapentin 600 mg twice daily and hydrocodone 10/325 every 4-6 hours as needed.  She reports that the pain is slightly improved over the past week or so but remains significant.      Objective   Vital Signs:   There were no vitals taken for this visit.     Physical Exam  Neurological:      Mental Status: She is oriented to person, place, and time.   Psychiatric:         Mood and Affect: Mood normal.         Behavior: Behavior normal.        Physical exam was not performed as patient was evaluated via telephone visit today.    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  · Additional labs 3/3/2022 with positive BECCA at 1: 320 dilution with homogeneous pattern, negative BECCA panel  · On 3/4/2022, B12 low normal at 238, folate greater than 20, negative serum protein electrophoresis and immunoelectrophoresis with free kappa light chain 65.6, free lambda light chain 37.1 and free light chain ratio 1.77 (appropriate for degree of renal dysfunction), LDH borderline elevated 238  · CT abdomen and pelvis 3/8/2022 with liver morphology suspicious for chronic liver disease, spleen normal in size at 10.4 cm.  No other abnormalities.  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    · Unclear whether  improvement in thrombocytopenia was related to steroids, B12 replacement, or improvement in CHF/volume overload.  · Recommended decreasing prednisone dose down to 15 mg  daily prior to transition to subacute nursing facility on 3/9/2022.  Patient however was discharged on prednisone 20 mg daily.  Requested that nursing facility forward labs weekly monitor platelet count and further gradually taper prednisone dose however this did not occur.  · On 3/23/2022, platelet count was 64,000.  Tapered prednisone from 20 down to 15 mg daily.  Intend to maintain platelet count above the 50-69771 range.  Consider additional treatment options with IVIG or rituximab if platelet count declines into the 30,000 range or below consistently.  · 3/31/2022 platelet count 65,000 and on 4/4/2022 prednisone was tapered down to 10 mg daily  · On 4/20/2022, platelet count of 96,000 and prednisone tapered down to 5 mg daily.  · Platelet count on 6/15/2022 92,000 with subsequent taper of prednisone to 5 mg every other day  · Platelet count did decline into the 40-74402 range on prednisone 5 mg every other day.  · Patient hospitalized 7/30 - 8/2/2022 due to COVID-19 infection/pneumonia.  She received dexamethasone alone.  During that time, platelet count was 46,000 on admission 7/30/2022 but increased up to 82,000 the time of discharge on 8/2/2022.    · Hospitalized again 8/5- 8/10/2022 due to symptoms of persistent weakness, fatigue, shortness of breath, cough, congestion following her COVID-19 infection. Bilateral lower extremity Doppler showed bilateral acute calf DVT.  Perfusion scan 8/8/2022 showed no evidence of pulmonary embolism.  Platelet count was in the 90-low 100,000 range and she was therefore anticoagulated initially with Lovenox and transitioned to Eliquis.  Transitioned from dexamethasone which she received for COVID-19 infection back to prednisone at 5 mg every other day.    · Platelet count subsequently decreased into the 60-81077 range.  On 8/19/2022 prednisone increased to 10 mg daily.    · With no improvement in platelet count on 8/22/2022, prednisone dose increased to 15 mg daily.     · Patient was admitted yet again 8/24 - 8/29/2022 related to L5 compression fracture. With concern for ongoing prednisone in the setting of compression fracture, discussion regarding use of Promacta 50 mg daily with potential prednisone taper pending response.  · Patient subsequently transferred to subacute nursing facility.  Per family, obtained Promacta 50 mg daily and provided this to the nursing facility and she began the medication on 9/10/2022.  Promacta discontinued 9/14/2022 with concern regarding potential increased thrombotic risk and risk of increased LFTs.  Elected to maintain patient on low-dose prednisone.  · Prednisone decreased from 15 down to 10 mg daily 9/14/2022.  Platelet count 98,000.  · Patient hospitalized 10/16- 10/20/2022.  On admission platelet count 66,000, on discharge 10/20/2022 platelet count 69,000.  Patient discharged on prednisone 15 mg daily  · Patient is evaluated today via telephone visit.  She is recovering at home in regards to her L5 compression fracture with significant ongoing pain and limited mobility.  Her last platelet count at the time of hospital discharge 10/20/2022 was 69,000.  She is currently on prednisone at 15 mg daily.  We will ask home health to draw I will repeat CBC tomorrow and pending that result we will determine potential change in prednisone dosing.  She does still have Promacta at home that could be used if needed albeit with some slight risks as noted above.  We will check CBC again in 2 weeks in 4 weeks via home health and she will return here in 6 weeks for follow-up.    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  Labs during hospitalization were again consistent with anemia secondary to chronic disease on 8/26/2022 with iron 36, ferritin 241, iron saturation 13%, TIBC 276, folate 6.69, B12 417.  Patient was receiving oral iron however was not iron deficient and was experiencing constipation, oral iron discontinued.    · Most recent hemoglobin on 10/20/2022 was 11.1.  Recheck tomorrow with CBC via home health    *Borderline B12 deficiency  · B12 level on 3/4/2022 was 238  · Patient initiated B12 1000 mcg IM injection daily x3 days and oral B12 1000 mcg daily  · Labs on 4/20/2022 with B12 811  · Patient was briefly receiving oral B12 replacement, not currently receiving B12.  · B12 level on 8/27/2022 was 417.     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  · Baseline creatinine 1.5-2  · Creatinine on admission 3/2/2022 was 2.79, subsequently improved  · Patient continues routine follow-up with nephrology  · Creatinine on 10/20/2022 was 1.63     *Acute on chronic diastolic CHF  · History of severe aortic stenosis status post aortic valve replacement in June 2013  · Patient admitted with progressive generalized weakness and dyspnea on exertion  · Patient receiving diuretics currently with Lasix 20 mg daily     *Diabetes mellitus type 2  · Exacerbated by steroids, subsequently improved with steroid taper     *Possible chronic liver disease  · Prior CTs with notation of cirrhotic liver morphology  · CT abdomen pelvis 4/15/2019 showed liver with a mildly shrunken appearance concerning for chronic liver disease.  Spleen however was normal in size.  · LFTs normal on 3/2/2022  · CT abdomen and pelvis 3/8/2022 with liver morphology suspicious for chronic liver disease, spleen normal in size at 10.4 cm.  No other abnormalities.  · Significance of the liver appearance on scans is unclear.  · On 3/23/2022, elevated LFTs with ALT 46, AST 40, normal total bilirubin  0.4.  · LFTs last performed on 9/14/2022 were normal    *GI prophylaxis  · Patient is currently receiving Pepcid 10 mg twice daily    *Pulmonary nodule  · Patient underwent CT scan at [facility] on 9/7/2021 that showed a 4 mm subpleural left lower lobe nodule  · Patient was scheduled for follow-up CT chest with thoracic surgery however declined to proceed with the scan.  Given her age and limited ability to treat a malignancy and with a high probability that this is a benign finding, scan was canceled.  · CT chest during hospitalization 8/7/2022 with no mention of pulmonary nodule.    *Bilateral calf DVT  · Patient was hospitalized 7/30 - 8/2/2022 due to COVID-19 infection/pneumonia.  She received dexamethasone alone.    · She was hospitalized again 8/5- 8/10/2022 due to symptoms of persistent weakness, fatigue, shortness of breath, cough, congestion following her COVID-19 infection.  During that hospitalization, she underwent CT chest 8/7/2022 which showed no significant abnormal findings.  Bilateral lower extremity Doppler showed bilateral acute calf DVT.  Perfusion scan 8/8/2022 showed no evidence of pulmonary embolism.  Platelet count was in the 90-low 100,000 range and she was therefore anticoagulated initially with Lovenox and transitioned to Eliquis 5 mg twice daily.   · During hospitalization, doppler 10/17/2022 lower extremities showed evolution of her previous bilateral acute calf DVT to subacute on the right and chronic on the left.  Eliquis was held during hospital stay and was not resumed at time of discharge.  · Patient currently remains off anticoagulation.  She is quite sedentary and is certainly at risk for progression of thrombosis.  Platelet count is borderline for anticoagulation in the 60-14728 range.  Patient is fairly limited currently in ability to undergo additional procedures with repeat Doppler.  Unable to examine today as she is being evaluated via telephone visit.  Reassess in 6 weeks  when she returns and consider whether repeat Doppler should be performed.  Eliquis could be resumed at lower dose 2.5 mg twice daily if platelet count remains in the 50-76534 range.    *L5 compression fracture  · Patient hospitalized 8/24 - 8/29/2022 related to L5 compression fracture.    · CT scans showed compression fracture at L5.    · MRI lumbar spine 8/24/2022 redemonstrated L5 compression fracture.    · Conservative management of compression fracture was recommended by neurosurgery.  · Patient hospitalized with significant pain from L5 compression fracture 10/6 - 10/20/2022. CT scan on 10/16/2022 showed slightly more loss of height in the L5 fracture.  MRI of the lumbar spine on 10/17/2022 showed 20% loss of height at L5 with multiple fracture planes.  She was evaluated by neurosurgery and by pain management.  In light of her thrombocytopenia, it was felt that she was not a candidate for epidural injection.       Plan:  1. Continue prednisone 15 mg daily for now  2. The patient currently remains off of Eliquis following recent hospitalization (Eliquis was held during hospitalization and was not resumed on discharge).  Platelet count borderline for anticoagulation currently  3. Continue GI prophylaxis with Pepcid 10 mg twice daily  4. CBC to be performed tomorrow with [facility] and forwarded to our office.  We will notify patient if any changes are to be made in prednisone dosing and/or consideration of resuming anticoagulation  5. CBC to be drawn by [facility] and forwarded to our office in 2 weeks and again in 4 weeks  6. MD visit in 6 weeks with CBC, CMP, IPF.    Patient did consent to telephone visit today    I did spend 13 minutes in direct conversation with the patient on the telephone today.  I did spend 30 minutes in total time caring for the patient today, time spent in review of records, telephone conversation, placement of orders, completion of documentation, coordination of care.

## 2022-10-26 ENCOUNTER — OFFICE VISIT (OUTPATIENT)
Dept: ONCOLOGY | Facility: CLINIC | Age: 87
End: 2022-10-26

## 2022-10-26 ENCOUNTER — APPOINTMENT (OUTPATIENT)
Dept: LAB | Facility: HOSPITAL | Age: 87
End: 2022-10-26

## 2022-10-26 DIAGNOSIS — D69.6 THROMBOCYTOPENIA: Primary | ICD-10-CM

## 2022-10-26 PROCEDURE — 99442 PR PHYS/QHP TELEPHONE EVALUATION 11-20 MIN: CPT | Performed by: INTERNAL MEDICINE

## 2022-10-28 ENCOUNTER — TELEPHONE (OUTPATIENT)
Dept: ONCOLOGY | Facility: CLINIC | Age: 87
End: 2022-10-28

## 2022-10-28 NOTE — TELEPHONE ENCOUNTER
Phoned patient's daughter to inform her that patient's platelet count had improved to 80,000, and that she should continue taking prednisone at her current dosage of 15 mg daily. Patient's daughter v/u.

## 2022-10-28 NOTE — TELEPHONE ENCOUNTER
Phoned [facility], spoke with patient care manager [place]. Asked [place] to have CBC results from 10/27/22 to our office, and also for CBC results to be sent in 2 weeks and again in 4 weeks. [place] r/v fax number and timeframe for CBC results.

## 2022-10-31 ENCOUNTER — READMISSION MANAGEMENT (OUTPATIENT)
Dept: CALL CENTER | Facility: HOSPITAL | Age: 87
End: 2022-10-31

## 2022-10-31 NOTE — OUTREACH NOTE
COVID-19 Week 2 Survey    Flowsheet Row Responses   [facility] facility patient discharged from? [facility]   Does the patient have one of the following disease processes/diagnoses(primary or secondary)? COVID-19   COVID-19 underlying condition? CHF   Call Number Call 1   COVID-19 Week 2: Call 1 attempt successful? No   Discharge diagnosis Closed fracture of fifth lumbar vertebra Acute deep vein thrombosis of calf  michele HILL - Licensed Nurse

## 2022-11-02 RX ORDER — PREDNISONE 1 MG/1
15 TABLET ORAL
Qty: 270 TABLET | Refills: 0 | Status: ON HOLD | OUTPATIENT
Start: 2022-11-02 | End: 2022-12-25 | Stop reason: SDUPTHER

## 2022-11-02 NOTE — TELEPHONE ENCOUNTER
Caller: ARAM SPAIN    Relationship: Emergency Contact [facility] call back number: [phone]    Requested Prescriptions:   Requested Prescriptions     Pending Prescriptions Disp Refills   • predniSONE (DELTASONE) 5 MG tablet       Sig: Take 3 tablets by mouth Daily With Breakfast.        Pharmacy where request should be sent: [facility] [id number] [facility] [id number] - [phone]  - [phone] FX     Additional details provided by patient:     WILL BE OUT OF THEM TOMORROW     AND WANTED TO ASK IF CAN CALL IN THE NEW SCRIPT AS A 3 MONTH  SUPPLY     Does the patient have less than a 3 day supply:  [x] Yes  [] No

## 2022-11-04 ENCOUNTER — LAB (OUTPATIENT)
Dept: LAB | Facility: HOSPITAL | Age: 87
End: 2022-11-04

## 2022-11-04 ENCOUNTER — HOSPITAL ENCOUNTER (OUTPATIENT)
Dept: CARDIOLOGY | Facility: HOSPITAL | Age: 87
Discharge: HOME | End: 2022-11-04

## 2022-11-04 ENCOUNTER — TRANSCRIBE ORDERS (OUTPATIENT)
Dept: ADMINISTRATIVE | Facility: HOSPITAL | Age: 87
End: 2022-11-04

## 2022-11-04 VITALS
HEART RATE: 58 BPM | HEIGHT: 57 IN | WEIGHT: 162 LBS | SYSTOLIC BLOOD PRESSURE: 173 MMHG | DIASTOLIC BLOOD PRESSURE: 56 MMHG | BODY MASS INDEX: 34.95 KG/M2 | OXYGEN SATURATION: 98 %

## 2022-11-04 DIAGNOSIS — N18.4 STAGE 4 CHRONIC KIDNEY DISEASE: ICD-10-CM

## 2022-11-04 DIAGNOSIS — I50.32 CHRONIC HEART FAILURE WITH PRESERVED EJECTION FRACTION: Primary | ICD-10-CM

## 2022-11-04 DIAGNOSIS — I48.0 PAROXYSMAL ATRIAL FIBRILLATION: ICD-10-CM

## 2022-11-04 DIAGNOSIS — I27.20 PULMONARY HYPERTENSION: ICD-10-CM

## 2022-11-04 DIAGNOSIS — N18.30 STAGE 3 CHRONIC KIDNEY DISEASE, UNSPECIFIED WHETHER STAGE 3A OR 3B CKD: Primary | ICD-10-CM

## 2022-11-04 DIAGNOSIS — E11.65 TYPE 2 DIABETES MELLITUS WITH HYPERGLYCEMIA, WITH LONG-TERM CURRENT USE OF INSULIN: ICD-10-CM

## 2022-11-04 DIAGNOSIS — Z79.4 TYPE 2 DIABETES MELLITUS WITH HYPERGLYCEMIA, WITH LONG-TERM CURRENT USE OF INSULIN: ICD-10-CM

## 2022-11-04 DIAGNOSIS — I10 ESSENTIAL HYPERTENSION: ICD-10-CM

## 2022-11-04 DIAGNOSIS — E78.1 HYPERTRIGLYCERIDEMIA: ICD-10-CM

## 2022-11-04 DIAGNOSIS — N18.30 STAGE 3 CHRONIC KIDNEY DISEASE, UNSPECIFIED WHETHER STAGE 3A OR 3B CKD: ICD-10-CM

## 2022-11-04 PROBLEM — S32.000A LUMBAR COMPRESSION FRACTURE, CLOSED, INITIAL ENCOUNTER (HCC): Status: RESOLVED | Noted: 2022-10-17 | Resolved: 2022-11-04

## 2022-11-04 LAB
ALBUMIN SERPL-MCNC: 3.8 G/DL (ref 3.5–5.2)
ALBUMIN/GLOB SERPL: 1.4 G/DL
ALP SERPL-CCNC: 62 U/L (ref 39–117)
ALT SERPL W P-5'-P-CCNC: 35 U/L (ref 1–33)
ANION GAP SERPL CALCULATED.3IONS-SCNC: 10.6 MMOL/L (ref 5–15)
AST SERPL-CCNC: 33 U/L (ref 1–32)
BILIRUB SERPL-MCNC: 0.6 MG/DL (ref 0–1.2)
BUN SERPL-MCNC: 28 MG/DL (ref 8–23)
BUN/CREAT SERPL: 17.3 (ref 7–25)
CALCIUM SPEC-SCNC: 9.4 MG/DL (ref 8.2–9.6)
CHLORIDE SERPL-SCNC: 103 MMOL/L (ref 98–107)
CO2 SERPL-SCNC: 27.4 MMOL/L (ref 22–29)
CREAT SERPL-MCNC: 1.62 MG/DL (ref 0.57–1)
EGFRCR SERPLBLD CKD-EPI 2021: 29.9 ML/MIN/1.73
GLOBULIN UR ELPH-MCNC: 2.8 GM/DL
GLUCOSE SERPL-MCNC: 58 MG/DL (ref 65–99)
NT-PROBNP SERPL-MCNC: 4485 PG/ML (ref 0–1800)
PHOSPHATE SERPL-MCNC: 3.2 MG/DL (ref 2.5–4.5)
POTASSIUM SERPL-SCNC: 4.6 MMOL/L (ref 3.5–5.2)
PROT SERPL-MCNC: 6.6 G/DL (ref 6–8.5)
PTH-INTACT SERPL-MCNC: 94.4 PG/ML (ref 15–65)
SODIUM SERPL-SCNC: 141 MMOL/L (ref 136–145)
URATE SERPL-MCNC: 8 MG/DL (ref 2.4–5.7)

## 2022-11-04 PROCEDURE — 84550 ASSAY OF BLOOD/URIC ACID: CPT

## 2022-11-04 PROCEDURE — 84100 ASSAY OF PHOSPHORUS: CPT

## 2022-11-04 PROCEDURE — 80053 COMPREHEN METABOLIC PANEL: CPT

## 2022-11-04 PROCEDURE — 36415 COLL VENOUS BLD VENIPUNCTURE: CPT

## 2022-11-04 PROCEDURE — 83880 ASSAY OF NATRIURETIC PEPTIDE: CPT

## 2022-11-04 PROCEDURE — 83970 ASSAY OF PARATHORMONE: CPT

## 2022-11-04 PROCEDURE — 99214 OFFICE O/P EST MOD 30 MIN: CPT | Performed by: NURSE PRACTITIONER

## 2022-11-04 PROCEDURE — G0463 HOSPITAL OUTPT CLINIC VISIT: HCPCS

## 2022-11-04 RX ORDER — HYDROCODONE BITARTRATE AND ACETAMINOPHEN 7.5; 325 MG/1; MG/1
1 TABLET ORAL EVERY 6 HOURS
Status: ON HOLD | COMMUNITY
Start: 2022-11-03 | End: 2023-03-14 | Stop reason: SDUPTHER

## 2022-11-04 NOTE — ADDENDUM NOTE
Encounter addended by: Shikha Sanabria MA on: 11/4/2022 3:20 PM   Actions taken: Charge Capture section accepted

## 2022-11-04 NOTE — PROGRESS NOTES
[facility] HEART FAILURE      Patient Name: Helena Carmen  :[id number]  Age: 89 y.o.  Sex: female  Referring Provider: Evi Fenton APRN   Primary Cardiologist: Beth Butcher MD  Encounter Provider:  RIGO Lwoe      Chief Complaint:   Chief Complaint   Patient presents with   • Congestive Heart Failure         Congestive Heart Failure  Associated symptoms include fatigue. Pertinent negatives include no chest pain, palpitations, shortness of breath or unexpected weight change.    this 89-year-old female, known to this provider, comes today for further evaluation regarding her chronic diastolic heart failure.  Current diagnoses to include CKD 4, aortic valve disease status post history of AVR with tissue valve, hyperlipidemia, hypertension, hiatal hernia, legally blind, paroxysmal atrial fibrillation, pulmonary hypertension, type 2 diabetes mellitus, history of uterine cancer.    Her history of diastolic dysfunction goes back as far as at least .  Repeat echocardiogram 2019 revealed LVEF of 56% with grade 2 LV diastolic dysfunction, RVSP of 69 mmHg, trivial pericardial effusion noted at that time thus Lasix was started.    It is noted that during appointment with RIGO late 2022 that she had been off of spironolactone for 1 to 2 weeks and with that had increased bilateral lower extremity edema as well as abdominal distention.  She received 1 dose of IV Lasix in clinic on 2022 and oral Lasix was restarted at 40 mg twice daily.  Nephrology, of note, stopped losartan, increased hydralazine, and decreased Lasix on 2022.    Admission from 3/2/2022 to 3/9/2022 was noted with acute on chronic exacerbation of her diastolic heart failure and acute kidney injury.  She has since been treated with Lasix 40 mg every other day and daily spironolactone per nephrology recommendation.    She was rehospitalized in 2022 and has since been staying at [place].  She was  readmitted again 8/5-8/10/2022 with COVID 19 pneumonia and found to have small bilateral DVTs.  She was treated with lovenox and transitioned to Eliquis. During admission she was found to be hypertensive.     Carvedilol was transitioned to metoprolol.    She is admitted October 6, 2022 to October 2022 with back pain that had worsened over the last few weeks.  Historically she has had an L5 compression fracture and had previously been hospitalized for this.  Neurosurgery evaluated and recommended medical management and brace with outpatient follow-up.  Bilateral calf vein DVT noted with completed course of apixaban.    She is currently complaining of back and leg pain, but from a heart failure perspective is doing fairly well.      The following portions of the patient's history were reviewed and updated as appropriate: allergies, current medications, past family history, past medical history, past social history, past surgical history and problem list.    Current Outpatient Medications   Medication Sig Dispense Refill   • acetaminophen (TYLENOL) 325 MG tablet Take 650 mg by mouth Every 6 (Six) Hours As Needed.     • amLODIPine (NORVASC) 10 MG tablet Take 1 tablet by mouth daily.     • budesonide-formoterol (SYMBICORT) 160-4.5 MCG/ACT inhaler Inhale 2 puffs 2 (Two) Times a Day.  12   • carvedilol (COREG) 12.5 MG tablet Take 1 tablet by mouth 2 (Two) Times a Day. 60 tablet 11   • cholecalciferol (VITAMIN D3) 25 MCG (1000 UT) tablet Take 1 tablet by mouth Daily.     • famotidine (PEPCID) 10 MG tablet Take 10 mg by mouth 2 (Two) Times a Day.     • furosemide (LASIX) 20 MG tablet Take 1 tablet by mouth Daily. 30 tablet 11   • gabapentin (NEURONTIN) 400 MG capsule Take 400 mg by mouth 2 (Two) Times a Day.     • hydrALAZINE (APRESOLINE) 25 MG tablet Take 3 tablets by mouth Every 8 (Eight) Hours.     • HYDROcodone-acetaminophen (NORCO) 7.5-325 MG per tablet Take 1 tablet by mouth 5 (Five) Times a Day.     • insulin glargine  (LANTUS, SEMGLEE) 100 UNIT/ML injection Inject 34 Units under the skin into the appropriate area as directed Every Night.  12   • insulin lispro (humaLOG) 100 UNIT/ML injection Inject 0-10 Units under the skin into the appropriate area as directed 3 (Three) Times a Day Before Meals. 2 units for every 50 > 150     • levothyroxine (SYNTHROID, LEVOTHROID) 25 MCG tablet Take 1 tablet by mouth daily.     • lidocaine (LIDODERM) 5 % Place 1 patch on the skin as directed by provider Daily for 15 days. Remove & Discard patch within 12 hours or as directed by MD 15 patch 0   • Loratadine (CLARITIN PO) Take  by mouth.     • LORazepam (ATIVAN) 0.5 MG tablet Take 1 tablet by mouth Every 8 (Eight) Hours As Needed for Anxiety. 10 tablet 0   • melatonin 1 MG tablet Take  by mouth.     • methocarbamol (ROBAXIN) 750 MG tablet Take 1 tablet by mouth Every 6 (Six) Hours As Needed for Muscle Spasms.     • montelukast (SINGULAIR) 10 MG tablet Take 1 tablet by mouth Every Night.     • polyethylene glycol (MIRALAX) 17 GM/SCOOP powder Take 17 g by mouth Daily.     • predniSONE (DELTASONE) 5 MG tablet Take 3 tablets by mouth Daily With Breakfast. 270 tablet 0   • sennosides-docusate (PERICOLACE) 8.6-50 MG per tablet Take 1 tablet by mouth Daily.     • sodium bicarbonate 650 MG tablet Take 1 tablet by mouth 2 (Two) Times a Day. 60 tablet 0   • tamsulosin (FLOMAX) 0.4 MG capsule 24 hr capsule Take 0.4 mg by mouth every night at bedtime.     • cetirizine (zyrTEC) 5 MG tablet Take 1 tablet by mouth Daily As Needed for Allergies.       No current facility-administered medications for this encounter.       Past Medical History:   Diagnosis Date   • Aortic valve stenosis     s/p tissue AVR   • Back pain    • CKD (chronic kidney disease)    • Colitis due to Clostridioides difficile 01/26/2022   • Diastolic dysfunction     Grade 2 per echocardiogram 2013   • Diverticulosis    • Exertional shortness of breath    • Heart disease    • Hiatal hernia    •  Hyperlipidemia    • Hypertension    • Hyperthyroidism    • Hypertriglyceridemia 05/31/2018   • Hypothyroidism    • Left ventricular hypertrophy    • Legally blind    • Liver disease    • Macular degeneration    • Mitral regurgitation    • Osteoarthritis of hip    • Pancreatitis 01/26/2022   • Paroxysmal atrial fibrillation (HCC)    • Premature ventricular contractions    • Pulmonary hypertension (HCC)    • Renal insufficiency syndrome    • Type 2 diabetes mellitus (HCC)    • Uterine cancer (HCC)        Past Surgical History:   Procedure Laterality Date   • AORTIC VALVE REPAIR/REPLACEMENT     • CATARACT EXTRACTION      1970, 1999   • CHOLECYSTECTOMY     • ENDOSCOPY  08/15/2014    no gross lesions in stomach/duodenum, erythrematous mucosa in stomach   • HYSTERECTOMY  2007   • STERNOTOMY         Physical Exam  Vitals and nursing note reviewed.   Constitutional:       General: She is not in acute distress.     Appearance: She is well-developed. She is obese. She is not ill-appearing.   HENT:      Head: Normocephalic and atraumatic.   Eyes:      Conjunctiva/sclera: Conjunctivae normal.      Pupils: Pupils are equal, round, and reactive to light.   Neck:      Vascular: No JVD.   Cardiovascular:      Rate and Rhythm: Normal rate. Rhythm irregular.      Heart sounds: Normal heart sounds. No murmur heard.    No friction rub. No gallop.   Pulmonary:      Effort: Pulmonary effort is normal. No respiratory distress.      Breath sounds: Normal breath sounds.   Abdominal:      General: Bowel sounds are normal. There is no distension.      Palpations: Abdomen is soft.   Musculoskeletal:         General: No swelling or deformity.   Skin:     General: Skin is warm and dry.      Capillary Refill: Capillary refill takes less than 2 seconds.   Neurological:      Mental Status: She is alert and oriented to person, place, and time. Mental status is at baseline.   Psychiatric:         Mood and Affect: Mood normal.         Behavior:  "Behavior normal.          Review of Systems   Constitutional: Positive for fatigue. Negative for unexpected weight change.   HENT: Negative for congestion and nosebleeds.    Eyes: Negative for photophobia and visual disturbance.   Respiratory: Negative for cough, chest tightness and shortness of breath.    Cardiovascular: Negative for chest pain, palpitations and leg swelling.   Gastrointestinal: Negative for abdominal distention and blood in stool.   Endocrine: Negative for polyphagia and polyuria.   Genitourinary: Positive for frequency. Negative for urgency.   Musculoskeletal: Negative for joint swelling and myalgias.   Skin: Negative for pallor and rash.   Neurological: Positive for weakness. Negative for dizziness, syncope, light-headedness, numbness and headaches.   Hematological: Does not bruise/bleed easily.   Psychiatric/Behavioral: Positive for sleep disturbance. Negative for confusion.        OBJECTIVE:  /56 (BP Location: Right arm, Patient Position: Sitting)   Pulse 58   Ht 144.8 cm (57.01\")   Wt 73.5 kg (162 lb)   SpO2 98%   BMI 35.05 kg/m²      Body mass index is 35.05 kg/m².  Wt Readings from Last 1 Encounters:   11/04/22 73.5 kg (162 lb)       Lab Review:  Renal Function: Estimated Creatinine Clearance: 20.1 mL/min (A) (by C-G formula based on SCr of 1.63 mg/dL (H)).    Lab Results   Component Value Date    PROBNP 2,472.0 (H) 08/24/2022       Results for orders placed during the hospital encounter of 07/30/22    Adult Transthoracic Echo Complete W/ Cont if Necessary Per Protocol    Interpretation Summary  · Left ventricular ejection fraction appears to be 61 - 65%. Left ventricular systolic function is normal.  · Left ventricular wall thickness is consistent with mild concentric hypertrophy.  · Left ventricular diastolic function is consistent with (grade II w/high LAP) pseudonormalization.  · Normal right ventricular cavity size and systolic function noted.  · The left atrial cavity is " moderately dilated.  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 The aortic valve peak and mean gradients are within defined limits. The prosthetic aortic valve is grossly normal.  · There is severe mitral annular calcification. The mitral valve leaflets are thickened. There are several calcified chordae  · Mild mitral valve regurgitation is present. No significant mitral valve stenosis is present.  · Mild to moderate tricuspid valve regurgitation is present.  · Calculated right ventricular systolic pressure from tricuspid regurgitation is 37 mmHg.  · There is no evidence of pericardial effusion      Procedures      6 MINUTE WALK                      Cardiac Procedures:  1. N/A       Previously trialed diuretics  Lasix      Previously trialed GDMT    Spironolactone  Losartan  Carvedilol     ASSESSMENT:     Diagnosis Plan   1. Chronic heart failure with preserved ejection fraction (HCC)  BNP      2. Type 2 diabetes mellitus with hyperglycemia, with long-term current use of insulin (HCC)        3. Stage 4 chronic kidney disease (HCC)        4. Paroxysmal atrial fibrillation (HCC)        5. Pulmonary hypertension (HCC)        6. Essential hypertension        7. Hypertriglyceridemia              PLAN OF CARE:  1.  HFpEF-NYHA class II.  Most recent ejection fraction 60% per echocardiogram.  Renal function precludes GDMT.    Historically she developed a urinary tract infection on Jardiance.  I have spoken with Dr. Butcher and she and is in agreement that she may be able to tolerate Farxiga as the incidence of UTI is a bit less with this anecdotally.  I have written the name of this medication on the back of my card and given it to her daughter to discuss with Dr. Wick at her appointment next week.  She is having labs today with Dr. Wick I am adding on a BNP.  I would like to continue her current GDMT, and as we will be alternating every 6 weeks seeing her with Dr. Butcher I will have her follow-up in mid  February, but also tentatively plan to see her in 2 weeks assuming we may be able to start Farxiga to evaluate how she is doing on it.    She is to continue following a strict low-sodium diet of 2000 mg daily or less, fluid restriction of 1500 mL daily, as well as remain adherent with daily weights.    Directions for when to call the clinic reviewed with the patient to include weight gain of 2 to 3 pounds in 24 hours, weight gain of 5 to 10 pounds within 7 days; worsening shortness of breath; worsening lower extremity edema or abdominal distention.    2.  Limited mobility-patient is wheelchair-bound.  This limits her activity level considerably.    3.  Hypertension- stable and controlled.    4.  Hyperlipidemia- stable and controlled.     5.  Paroxysmal atrial fibrillation- rate stable and controlled, rhythm irregular.  Currently on Lopressor.    6.  DM2- stable and well-controlled on insulin.    7.  CKD 4-followed by nephrology.  Stable.        If okay with nephrology will start Farxiga 10 mg daily; follow-up mid February or sooner if needed; if starting Farxiga we will see back in 2 weeks; BNP today        Advance Care Planning   Will discuss at subsequent visit      Thank you for allowing me to participate in the care of your patient,         RIGO Lowe  [facility] HEART FAILURE  11/04/22  14:47 EDT      **Leisa Disclaimer:**  Much of this encounter note is an electronic transcription/translation of spoken language to printed text. The electronic translation of spoken language may permit erroneous, or at times, nonsensical words or phrases to be inadvertently transcribed. Although I have reviewed the note for such errors, some may still exist.

## 2022-11-07 ENCOUNTER — READMISSION MANAGEMENT (OUTPATIENT)
Dept: CALL CENTER | Facility: HOSPITAL | Age: 87
End: 2022-11-07

## 2022-11-07 NOTE — OUTREACH NOTE
COVID-19 Week 3 Survey    Flowsheet Row Responses   [facility] patient discharged from? [place]   Does the patient have one of the following disease processes/diagnoses(primary or secondary)? COVID-19   COVID-19 underlying condition? CHF   Call Number Call 1   COVID-19 Week 3: Call 1 attempt successful? Yes   Call start time [phone]   Call end time [phone]   Discharge diagnosis Closed fracture of fifth lumbar vertebra Acute deep vein thrombosis of calf  covid   Person spoke with today (if not patient) and relationship Radha, sondra   Meds reviewed with patient/caregiver? Yes   Is the patient having any side effects they believe may be caused by any medication additions or changes? No   Does the patient have all medications ordered at discharge? Yes   Is the patient taking all medications as directed (includes completed medication regime)? Yes   Does the patient have a primary care provider?  Yes   Nursing Interventions Verified appointment date/time/provider   Has the patient kept scheduled appointments due by today? Yes   [facility]    Has home health visited the patient within 72 hours of discharge? Yes   Psychosocial issues? No   Comments daughterRadha,  states skin fragile, bruises easily, then bleeds, using light bandages, advised to contact PCP and kidney specialist at [facility] on 11/8/22, to set up wound care consult, daughter agrees with plan   Did the patient receive a copy of their discharge instructions? Yes   Did the patient receive a copy of COVID-19 specific instructions? Yes   Nursing interventions Reviewed instructions with patient   What is the patient's perception of their health status since discharge? Improving   Does the patient have any of the following symptoms? None  [congestion at night]   Nursing Interventions Nurse provided patient education   Pulse Ox monitoring None   Is the patient/caregiver able to teach back steps to recovery at home? Set small,  achievable goals for return to baseline health, Rest and rebuild strength, gradually increase activity, Eat a well-balance diet   Is the patient/caregiver able to teach back the hierarchy of who to call/visit for symptoms/problems? PCP, Specialist, Home health nurse, Urgent Care, ED, 911 Yes   Is the patient able to teach back Heart Failure diet management? Yes   Is the patient able to teach back Heart Failure Zones? Yes   Is the patient able to teach back signs and symptoms of worsening condition? (i.e. weight gain, shortness of air, etc.) Yes   COVID-19 call completed? Yes          CODI HILL - Registered Nurse

## 2022-11-11 ENCOUNTER — TELEPHONE (OUTPATIENT)
Dept: CARDIOLOGY | Facility: CLINIC | Age: 87
End: 2022-11-11

## 2022-11-11 NOTE — TELEPHONE ENCOUNTER
[phone]    Dasia pts granddaughter called.   Pt is currently is taking 20mg po qam, due to it keeping her up all night.  She states her ankles are swollen and JASMYNE hose are not working pt has gained 2lbs.      Pt does not have SOA, CP or other symptoms.      Please advise.    [facility]

## 2022-11-14 ENCOUNTER — TELEPHONE (OUTPATIENT)
Dept: CARDIOLOGY | Facility: HOSPITAL | Age: 87
End: 2022-11-14

## 2022-11-14 NOTE — TELEPHONE ENCOUNTER
Called spoke with Dasia patient has some swelling in legs still but not as bad. No soa patient can only take dieretic in mornings. Ankles start out good in morning but swell through out the day.       Per rossana if no swelling in morning when patient wakes up then ok no changes at this time. Patient has follow up appt on 11/21/22. Informed Dasia if patient has any other problems let our office know.

## 2022-11-14 NOTE — TELEPHONE ENCOUNTER
----- Message from RIGO Lowe sent at 11/11/2022  8:44 AM EST -----  Do not start Farxiga, since the call is already in to [facility] we'll just let them answer.     ThanksABNER  ----- Message -----  From: Shikha Sanabria MA  Sent: 11/11/2022   8:39 AM EST  To: RIGO Lowe    Patient daughter called states that Nephrologist said NO to medication ( Farxiga )you was inquiring about starting patient on. She states Nephrologist said was just as hard on kidneys as Jardiance.       FYI..Also daughter has put a call in to Dr. Butcher to see if patient needs to increase lasix. Patient ankles are still swollen.       Per your note:   If okay with nephrology will start Farxiga 10 mg daily; follow-up mid February or sooner if needed; if starting Farxiga we will see back in 2 weeks; BNP today    Please advise    Shikha Dickson

## 2022-11-29 ENCOUNTER — HOSPITAL ENCOUNTER (OUTPATIENT)
Dept: GENERAL RADIOLOGY | Facility: HOSPITAL | Age: 87
Discharge: HOME | End: 2022-11-29
Admitting: NEUROLOGICAL SURGERY

## 2022-11-29 DIAGNOSIS — S32.000A LUMBAR COMPRESSION FRACTURE, CLOSED, INITIAL ENCOUNTER: ICD-10-CM

## 2022-11-29 DIAGNOSIS — S32.059A CLOSED FRACTURE OF FIFTH LUMBAR VERTEBRA, UNSPECIFIED FRACTURE MORPHOLOGY, INITIAL ENCOUNTER: ICD-10-CM

## 2022-11-29 PROCEDURE — 72100 X-RAY EXAM L-S SPINE 2/3 VWS: CPT

## 2022-12-06 NOTE — PROGRESS NOTES
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  She was to have come into the office however experienced a fall at home this morning and was evaluated in the emergency department, had labs drawn and then returned home and scheduled today's visit as video visit.  She apparently tripped in the hallway and fell onto her right hip.  In the emergency department, she had plain films performed of the lumbar spine, right hip, right knee with no evidence of fracture noted.  By the patient's daughter's report, the patient has been doing quite well over the past few weeks, gradually gaining strength back.  She has been able to ambulate into the driveway on occasion and has been ambulating around the house more easily.  She remains off anticoagulation with Eliquis due to ongoing thrombocytopenia.  She has not experienced any bleeding issues.  She has not yet shown any significant evidence of ecchymosis after her fall today.  She did not have any head injury.  The patient has been having labs drawn weekly by home health and platelet count has been in the 50-86227 range consistently continuing on prednisone 15 mg daily.      Objective   Vital Signs:   There were no vitals taken for this visit.    Physical Exam       Physical exam was not performed today as patient was evaluated via video visit.    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  · Additional labs 3/3/2022 with positive BECCA at 1: 320 dilution with homogeneous pattern, negative BECCA panel  · On 3/4/2022, B12 low normal at 238, folate greater than 20, negative serum protein electrophoresis and immunoelectrophoresis with free kappa light chain 65.6, free lambda light chain 37.1 and free light chain ratio 1.77 (appropriate for degree of renal dysfunction), LDH borderline elevated 238  · CT abdomen and pelvis 3/8/2022 with liver morphology suspicious for chronic liver disease, spleen normal in size at 10.4 cm.  No other abnormalities.  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    · Unclear whether  improvement in thrombocytopenia was related to steroids, B12 replacement, or improvement in CHF/volume overload.  · Recommended decreasing prednisone dose down to 15 mg daily prior to transition to subacute nursing facility on 3/9/2022.  Patient however was discharged on prednisone 20 mg daily.  Requested that nursing facility forward labs weekly monitor platelet count and further gradually taper prednisone dose however this did not occur.  · On 3/23/2022, platelet count was 64,000.  Tapered prednisone from 20 down to 15 mg daily.  Intend to maintain platelet count above the 50-49935 range.  Consider additional treatment options with IVIG or rituximab if platelet count declines into the 30,000 range or below consistently.  · 3/31/2022 platelet count 65,000 and on 4/4/2022 prednisone was tapered down to 10 mg daily  · On 4/20/2022, platelet count of 96,000 and prednisone tapered down  to 5 mg daily.  · Platelet count on 6/15/2022 92,000 with subsequent taper of prednisone to 5 mg every other day  · Platelet count did decline into the 40-61761 range on prednisone 5 mg every other day.  · Patient hospitalized 7/30 - 8/2/2022 due to COVID-19 infection/pneumonia.  She received dexamethasone alone.  During that time, platelet count was 46,000 on admission 7/30/2022 but increased up to 82,000 the time of discharge on 8/2/2022.    · Hospitalized again 8/5- 8/10/2022 due to symptoms of persistent weakness, fatigue, shortness of breath, cough, congestion following her COVID-19 infection. Bilateral lower extremity Doppler showed bilateral acute calf DVT.  Perfusion scan 8/8/2022 showed no evidence of pulmonary embolism.  Platelet count was in the 90-low 100,000 range and she was therefore anticoagulated initially with Lovenox and transitioned to Eliquis.  Transitioned from dexamethasone which she received for COVID-19 infection back to prednisone at 5 mg every other day.    · Platelet count subsequently decreased into the 60-82682 range.  On 8/19/2022 prednisone increased to 10 mg daily.    · With no improvement in platelet count on 8/22/2022, prednisone dose increased to 15 mg daily.    · Patient was admitted yet again 8/24 - 8/29/2022 related to L5 compression fracture. With concern for ongoing prednisone in the setting of compression fracture, discussion regarding use of Promacta 50 mg daily with potential prednisone taper pending response.  · Patient subsequently transferred to subacute nursing facility.  Per family, obtained Promacta 50 mg daily and provided this to the nursing facility and she began the medication on 9/10/2022.  Promacta discontinued 9/14/2022 with concern regarding potential increased thrombotic risk and risk of increased LFTs.  Elected to maintain patient on low-dose prednisone.  · Prednisone decreased from 15 down to 10 mg daily 9/14/2022.  Platelet count 98,000.  · Patient  hospitalized 10/16- 10/20/2022.  On admission platelet count 66,000, on discharge 10/20/2022 platelet count 69,000.  Patient discharged on prednisone 15 mg daily  · Patient is seen today in video visit.  She is continuing on prednisone 15 mg daily to maintain her platelet count in an acceptable range related to her underlying ITP.  She unfortunately experienced a fall at home with subsequent emergency department visit earlier today.  Fortunately she had no evidence of fracture on plain films of her lumbar spine, right knee and right hip.  She has had no bleeding complications.  She remains off anticoagulation.  We have been monitoring her platelet count on a weekly basis being drawn by home health, platelet count has been in the 50-84975 range.  Today, platelet count is 57,000 with IPF 8.4%.  We discussed attempting to decrease prednisone dose down to 10 mg daily.  We will continue monitoring her count via home health on a weekly basis.  We will try to maintain platelet count at least in the 40-77084 range.  We do not intend to taper her off prednisone at this point however if we can get her to a lower maintenance dose it would be preferred.  I will see her back in 8 weeks for follow-up.    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  Labs during hospitalization were again consistent with anemia secondary to chronic disease on 8/26/2022 with iron 36, ferritin 241, iron saturation 13%, TIBC 276, folate 6.69, B12 417.  Patient was receiving oral iron however  was not iron deficient and was experiencing constipation, oral iron discontinued.    · Hemoglobin today 11.4    *Borderline B12 deficiency  · B12 level on 3/4/2022 was 238  · Patient initiated B12 1000 mcg IM injection daily x3 days and oral B12 1000 mcg daily  · Labs on 4/20/2022 with B12 811  · Patient was briefly receiving oral B12 replacement, not currently receiving B12.  · B12 level on 8/27/2022 was 417.     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  · Baseline creatinine 1.5-2  · Creatinine on admission 3/2/2022 was 2.79, subsequently improved  · Patient continues routine follow-up with nephrology  · Creatinine today stable at 1.68     *Acute on chronic diastolic CHF  · History of severe aortic stenosis status post aortic valve replacement in June 2013  · Patient admitted with progressive generalized weakness and dyspnea on exertion  · Patient receiving diuretics currently with Lasix 20 mg daily     *Diabetes mellitus type 2  · Exacerbated by steroids, subsequently improved with steroid taper     *Possible chronic liver disease  · Prior CTs with notation of cirrhotic liver morphology  · CT abdomen pelvis 4/15/2019 showed liver with a mildly shrunken appearance concerning for chronic liver disease.  Spleen however was normal in size.  · LFTs normal on 3/2/2022  · CT abdomen and pelvis 3/8/2022 with liver morphology suspicious for chronic liver disease, spleen normal in size at 10.4 cm.  No other abnormalities.  · Significance of the liver appearance on scans is unclear.  · On 3/23/2022, elevated LFTs with ALT 46, AST 40, normal total bilirubin 0.4.  · LFTs today with ALT 49, AST 27, total bilirubin 0.6    *GI prophylaxis  · Patient is currently receiving Pepcid 10 mg twice daily while on steroids    *Pulmonary nodule  · Patient underwent CT scan at [facility] urology on 9/7/2021 that showed a 4 mm subpleural left lower lobe nodule  · Patient was scheduled for follow-up CT chest with thoracic surgery however declined to proceed with the scan.  Given  her age and limited ability to treat a malignancy and with a high probability that this is a benign finding, scan was canceled.  · CT chest during hospitalization 8/7/2022 with no mention of pulmonary nodule.    *Bilateral calf DVT  · Patient was hospitalized 7/30 - 8/2/2022 due to COVID-19 infection/pneumonia.  She received dexamethasone alone.    · She was hospitalized again 8/5- 8/10/2022 due to symptoms of persistent weakness, fatigue, shortness of breath, cough, congestion following her COVID-19 infection.  During that hospitalization, she underwent CT chest 8/7/2022 which showed no significant abnormal findings.  Bilateral lower extremity Doppler showed bilateral acute calf DVT.  Perfusion scan 8/8/2022 showed no evidence of pulmonary embolism.  Platelet count was in the 90-low 100,000 range and she was therefore anticoagulated initially with Lovenox and transitioned to Eliquis 5 mg twice daily.   · During hospitalization, doppler 10/17/2022 lower extremities showed evolution of her previous bilateral acute calf DVT to subacute on the right and chronic on the left.  Eliquis was held during hospital stay and was not resumed at time of discharge.  · Patient currently remains off anticoagulation.  She is quite sedentary and is certainly at risk for progression of thrombosis.  Platelet count remains in the 50-27305 range.  Anticoagulation would likely confer significant risk for bleeding in this situation.    *L5 compression fracture  · Patient hospitalized 8/24 - 8/29/2022 related to L5 compression fracture.    · CT scans showed compression fracture at L5.    · MRI lumbar spine 8/24/2022 redemonstrated L5 compression fracture.    · Conservative management of compression fracture was recommended by neurosurgery.  · Patient hospitalized with significant pain from L5 compression fracture 10/6 - 10/20/2022. CT scan on 10/16/2022 showed slightly more loss of height in the L5 fracture.  MRI of the lumbar spine on  10/17/2022 showed 20% loss of height at L5 with multiple fracture planes.  She was evaluated by neurosurgery and by pain management.  In light of her thrombocytopenia, it was felt that she was not a candidate for epidural injection.  · Pain has improved over time    Plan:  1. Attempt to decrease prednisone from 15 down to 10 mg daily  2. The patient currently remains off of anticoagulation with Eliquis.  Patient felt to have increased risk for bleeding complications on anticoagulation with platelet count remains in the 50-60,000 range  3. We will request home health to draw weekly CBC to be faxed to our office for weekly monitoring of platelet count  4. MD visit in 8 weeks with CBC, CMP, IPF    Patient did consent to video visit today.    I did spend 8 minutes in video conversation today.  I did spend a total of 22 minutes in time caring for the patient today with additional time spent in review of records, video conversation, placement of orders, completion of documentation.

## 2022-12-07 ENCOUNTER — APPOINTMENT (OUTPATIENT)
Dept: LAB | Facility: HOSPITAL | Age: 87
End: 2022-12-07

## 2022-12-07 ENCOUNTER — APPOINTMENT (OUTPATIENT)
Dept: GENERAL RADIOLOGY | Facility: HOSPITAL | Age: 87
End: 2022-12-07

## 2022-12-07 ENCOUNTER — TELEPHONE (OUTPATIENT)
Dept: NEUROSURGERY | Facility: CLINIC | Age: 87
End: 2022-12-07

## 2022-12-07 ENCOUNTER — HOSPITAL ENCOUNTER (EMERGENCY)
Facility: HOSPITAL | Age: 87
Discharge: HOME | End: 2022-12-07
Attending: EMERGENCY MEDICINE

## 2022-12-07 ENCOUNTER — TELEMEDICINE (OUTPATIENT)
Dept: ONCOLOGY | Facility: CLINIC | Age: 87
End: 2022-12-07

## 2022-12-07 ENCOUNTER — LAB (OUTPATIENT)
Dept: LAB | Facility: HOSPITAL | Age: 87
End: 2022-12-07

## 2022-12-07 VITALS
SYSTOLIC BLOOD PRESSURE: 132 MMHG | TEMPERATURE: 97.5 F | DIASTOLIC BLOOD PRESSURE: 59 MMHG | RESPIRATION RATE: 16 BRPM | HEART RATE: 54 BPM | OXYGEN SATURATION: 93 %

## 2022-12-07 DIAGNOSIS — S39.012A LUMBAR STRAIN, INITIAL ENCOUNTER: ICD-10-CM
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
## 2022-12-07 DIAGNOSIS — S70.01XA CONTUSION OF RIGHT HIP, INITIAL ENCOUNTER: ICD-10-CM

## 2022-12-07 DIAGNOSIS — S83.91XA SPRAIN OF RIGHT KNEE, UNSPECIFIED LIGAMENT, INITIAL ENCOUNTER: ICD-10-CM

## 2022-12-07 DIAGNOSIS — D69.3 CHRONIC ITP (IDIOPATHIC THROMBOCYTOPENIA): Primary | ICD-10-CM

## 2022-12-07 DIAGNOSIS — D69.6 THROMBOCYTOPENIA: ICD-10-CM

## 2022-12-07 LAB
ALBUMIN SERPL-MCNC: 3.7 G/DL (ref 3.5–5.2)
ALBUMIN/GLOB SERPL: 1.8 G/DL
ALP SERPL-CCNC: 67 U/L (ref 39–117)
ALT SERPL W P-5'-P-CCNC: 49 U/L (ref 1–33)
ANION GAP SERPL CALCULATED.3IONS-SCNC: 11 MMOL/L (ref 5–15)
AST SERPL-CCNC: 27 U/L (ref 1–32)
BASOPHILS # BLD AUTO: 0.02 10*3/MM3 (ref 0–0.2)
BASOPHILS NFR BLD AUTO: 0.2 % (ref 0–1.5)
BILIRUB SERPL-MCNC: 0.6 MG/DL (ref 0–1.2)
BUN SERPL-MCNC: 30 MG/DL (ref 8–23)
BUN/CREAT SERPL: 17.9 (ref 7–25)
CALCIUM SPEC-SCNC: 9.2 MG/DL (ref 8.2–9.6)
CHLORIDE SERPL-SCNC: 104 MMOL/L (ref 98–107)
CO2 SERPL-SCNC: 27 MMOL/L (ref 22–29)
CREAT SERPL-MCNC: 1.68 MG/DL (ref 0.57–1)
DEPRECATED RDW RBC AUTO: 50 FL (ref 37–54)
EGFRCR SERPLBLD CKD-EPI 2021: 28.6 ML/MIN/1.73
EOSINOPHIL # BLD AUTO: 0.04 10*3/MM3 (ref 0–0.4)
EOSINOPHIL NFR BLD AUTO: 0.4 % (ref 0.3–6.2)
ERYTHROCYTE [DISTWIDTH] IN BLOOD BY AUTOMATED COUNT: 15 % (ref 12.3–15.4)
GLOBULIN UR ELPH-MCNC: 2.1 GM/DL
GLUCOSE SERPL-MCNC: 94 MG/DL (ref 65–99)
HCT VFR BLD AUTO: 41.4 % (ref 34–46.6)
HGB BLD-MCNC: 13.3 G/DL (ref 12–15.9)
IMM GRANULOCYTES # BLD AUTO: 0.07 10*3/MM3 (ref 0–0.05)
IMM GRANULOCYTES NFR BLD AUTO: 0.7 % (ref 0–0.5)
LYMPHOCYTES # BLD AUTO: 1.13 10*3/MM3 (ref 0.7–3.1)
LYMPHOCYTES NFR BLD AUTO: 11.4 % (ref 19.6–45.3)
MCH RBC QN AUTO: 29.1 PG (ref 26.6–33)
MCHC RBC AUTO-ENTMCNC: 32.1 G/DL (ref 31.5–35.7)
MCV RBC AUTO: 90.6 FL (ref 79–97)
MONOCYTES # BLD AUTO: 0.52 10*3/MM3 (ref 0.1–0.9)
MONOCYTES NFR BLD AUTO: 5.2 % (ref 5–12)
NEUTROPHILS NFR BLD AUTO: 8.15 10*3/MM3 (ref 1.7–7)
NEUTROPHILS NFR BLD AUTO: 82.1 % (ref 42.7–76)
NRBC BLD AUTO-RTO: 0 /100 WBC (ref 0–0.2)
PLATELET # BLD AUTO: 57 10*3/MM3 (ref 140–450)
PLATELET # BLD AUTO: 57 10*3/MM3 (ref 140–450)
PLATELETS.RETICULATED NFR BLD AUTO: 8.4 % (ref 0.9–6.5)
PMV BLD AUTO: 11.1 FL (ref 6–12)
POTASSIUM SERPL-SCNC: 4.7 MMOL/L (ref 3.5–5.2)
PROT SERPL-MCNC: 5.8 G/DL (ref 6–8.5)
RBC # BLD AUTO: 4.57 10*6/MM3 (ref 3.77–5.28)
SODIUM SERPL-SCNC: 142 MMOL/L (ref 136–145)
WBC NRBC COR # BLD: 9.93 10*3/MM3 (ref 3.4–10.8)

## 2022-12-07 PROCEDURE — 85055 RETICULATED PLATELET ASSAY: CPT

## 2022-12-07 PROCEDURE — 85025 COMPLETE CBC W/AUTO DIFF WBC: CPT

## 2022-12-07 PROCEDURE — 36415 COLL VENOUS BLD VENIPUNCTURE: CPT

## 2022-12-07 PROCEDURE — 99283 EMERGENCY DEPT VISIT LOW MDM: CPT

## 2022-12-07 PROCEDURE — 73502 X-RAY EXAM HIP UNI 2-3 VIEWS: CPT

## 2022-12-07 PROCEDURE — 99214 OFFICE O/P EST MOD 30 MIN: CPT | Performed by: INTERNAL MEDICINE

## 2022-12-07 PROCEDURE — 80053 COMPREHEN METABOLIC PANEL: CPT

## 2022-12-07 PROCEDURE — 72110 X-RAY EXAM L-2 SPINE 4/>VWS: CPT

## 2022-12-07 PROCEDURE — 73562 X-RAY EXAM OF KNEE 3: CPT

## 2022-12-07 RX ORDER — LIDOCAINE 50 MG/G
1 PATCH TOPICAL EVERY 24 HOURS
Qty: 6 EACH | Refills: 0 | Status: ON HOLD | OUTPATIENT
Start: 2022-12-07

## 2022-12-07 RX ORDER — LIDOCAINE 50 MG/G
1 PATCH TOPICAL
Status: DISCONTINUED | OUTPATIENT
Start: 2022-12-07 | End: 2022-12-07 | Stop reason: HOSPADM

## 2022-12-07 RX ADMIN — LIDOCAINE 1 PATCH: 700 PATCH TOPICAL at 11:27

## 2022-12-07 NOTE — ED PROVIDER NOTES
EMERGENCY DEPARTMENT ENCOUNTER    CHIEF COMPLAINT  Chief Complaint: Right hip and knee pain  History given by: Patient and family at bedside  History limited by: Nothing  Room Number: 23/23  PMD: Mariah Hickman MD      HPI:  Pt is a 89 y.o. female presents complaining of slip and fall at 7:30 AM this morning.  Patient reports she was in the bathroom, fell onto her right hip.  Patient reports right hip and knee pain, reports she has exacerbation of her chronic back pain that has been improved after an L5 fracture recently.  Patient denies hitting her head, loss consciousness, neck pain, chest pain, shortness of air, cough, fever, abdominal pain, weakness, numbness, incontinence that is new for her.      Duration: Just prior to arrival  Associated Symptoms: Low back pain  Aggravating Factors: Movement  Alleviating Factors: Nothing  Treatment before arrival: Nothing    PAST MEDICAL HISTORY  Active Ambulatory Problems     Diagnosis Date Noted   • Aortic valve stenosis 04/05/2016   • Fatigue 04/05/2016   • MI (mitral incompetence) 04/05/2016   • PVC (premature ventricular contraction) 04/05/2016   • Legally blind 05/25/2018   • Essential hypertension 05/25/2018   • Hypertriglyceridemia 05/31/2018   • Pulmonary hypertension (Formerly [facility]) 06/25/2020   • Paroxysmal atrial fibrillation ([facility]) 06/25/2020   • Anxiety 07/10/2014   • Stage 4 chronic kidney disease (Former[facility]) 03/20/2015   • Chronic pain disorder 01/26/2022   • Degeneration of lumbar intervertebral disc 09/09/2014   • Type 2 diabetes mellitus with hyperglycemia ([facility]) 01/26/2022   • Esophageal reflux 07/10/2014   • Gastroparesis 01/26/2022   • Hiatal hernia 01/26/2022   • Iatrogenic hypothyroidism 07/08/2014   • Macular degeneration 01/26/2022   • Malignant neoplasm of uterus ([facility]ly [facility]) 01/26/2022   • Osteoarthritis of knee 07/10/2014   • Peripheral nerve disease 07/10/2014   • Secondary hyperparathyroidism ([facility]ly [facility]) 04/05/2016   • Thrombocytopenia ([facility]ly [facility]) 07/11/2014   • Chronic  heart failure with preserved ejection fraction ([facility]) 03/02/2022   • Other cirrhosis of liver ([facility]) 03/03/2022   • Hypothyroidism 02/22/2022   • Nonrheumatic tricuspid valve regurgitation 02/22/2022   • Anemia, chronic disease 07/30/2022   • Closed fracture of fifth lumbar vertebra ([facility]) 08/24/2022   • Closed fracture of fifth lumbar vertebra, unspecified fracture morphology, initial encounter ([facility]) 08/25/2022   • Diabetic polyneuropathy associated with type 2 diabetes mellitus ([facility]) 08/26/2022   • Chronic ITP (idiopathic thrombocytopenia) ([facility]) 08/28/2022   • Chronic midline low back pain with bilateral sciatica 10/16/2022   • Acute deep vein thrombosis of calf, bilateral ([facility]) 10/17/2022     Resolved Ambulatory Problems     Diagnosis Date Noted   • Diastolic dysfunction 04/05/2016   • Hypertensive pulmonary vascular disease ([facility]) 04/05/2016   • Bioprosthetic aortic valve replacement 05/10/2017   • Breast screening 07/08/2014   • Abdominal pain, right lower quadrant 12/13/2020   • Allergic rhinitis 07/10/2014   • Angina pectoris ([facility]) 07/10/2014   • Pancreatitis 01/26/2022   • Colitis due to Clostridioides difficile 01/26/2022   • Hypothyroidism 01/26/2022   • Long term (current) use of opiate analgesic 01/26/2022   • Uncontrolled type 2 diabetes mellitus 07/08/2014   • Benign essential hypertension 07/08/2014   • Hypertension 01/26/2022   • Pulmonary hypertension ([facility]) 04/05/2016   • Hypercholesterolemia 01/26/2022   • Hyperlipidemia 07/08/2014   • Legal blindness 05/25/2018   • Mitral valve disorder 07/10/2014   • Mitral valve regurgitation 04/05/2016   • Ventricular premature beats 04/05/2016   • History of aortic valve replacement 05/10/2017   • Nonrheumatic tricuspid valve regurgitation    • Elevated serum creatinine    • CHF (congestive heart failure) ([facility]) 03/02/2022   • Hypertensive disorder 02/22/2022   • Pancreatitis 02/22/2022   • Sepsis ([facility]) 06/05/2022   • COVID-19 07/30/2022   • Hyponatremia 07/30/2022    • Weakness generalized 2022   • Leukopenia 2022   • Cytokine release syndrome, grade 1 2022   • Urine retention 2022   • COVID-19 virus infection 2022   • Acute DVT (deep venous thrombosis) (Formerly [facility]) 2022   • Lumbar compression fracture, closed, initial encounter ([facility]) 10/17/2022     Past Medical History:   Diagnosis Date   • Back pain    • CKD (chronic kidney disease)    • Diverticulosis    • Exertional shortness of breath    • Heart disease    • Hyperthyroidism    • Left ventricular hypertrophy    • Liver disease    • Mitral regurgitation    • Osteoarthritis of hip    • Premature ventricular contractions    • Renal insufficiency syndrome    • Type 2 diabetes mellitus ([facility])    • Uterine cancer (Formerly [facility])        PAST SURGICAL HISTORY  Past Surgical History:   Procedure Laterality Date   • AORTIC VALVE REPAIR/REPLACEMENT     • CATARACT EXTRACTION      ,    • CHOLECYSTECTOMY     • ENDOSCOPY  08/15/2014    no gross lesions in stomach/duodenum, erythrematous mucosa in stomach   • HYSTERECTOMY     • STERNOTOMY         FAMILY HISTORY  Family History   Problem Relation Age of Onset   • Heart disease Mother    • Hypertension Mother    • Stroke Mother    • Diabetes Mother         mellitus   • Other Other         cardiovascular disorder       SOCIAL HISTORY  Social History     Socioeconomic History   • Marital status:    Tobacco Use   • Smoking status: Former     Packs/day: 0.50     Types: Cigarettes     Quit date: 7/10/1969     Years since quittin.4   • Smokeless tobacco: Never   Vaping Use   • Vaping Use: Never used   Substance and Sexual Activity   • Alcohol use: No     Comment: caffeine use - coffee 2 cups daily   • Drug use: No   • Sexual activity: Defer       ALLERGIES  Erythromycin, Statins, Cephalexin, Penicillins, and Sulfa antibiotics    REVIEW OF SYSTEMS  Review of Systems   Constitutional: Negative for chills and fever.   HENT: Negative for rhinorrhea and sore  throat.    Eyes: Negative for visual disturbance.   Respiratory: Negative for cough and shortness of breath.    Cardiovascular: Negative for chest pain, palpitations and leg swelling.   Gastrointestinal: Negative for abdominal pain, diarrhea and vomiting.   Endocrine: Negative.    Genitourinary: Negative for decreased urine volume, dysuria and frequency.   Musculoskeletal: Positive for arthralgias ( Right hip and knee) and back pain ( Low back). Negative for neck pain.   Skin: Negative for rash.   Neurological: Negative for syncope and headaches.   Psychiatric/Behavioral: Negative.    All other systems reviewed and are negative.      PHYSICAL EXAM  ED Triage Vitals   Temp Heart Rate Resp BP SpO2   12/07/22 0935 12/07/22 0935 12/07/22 0935 12/07/22 0938 12/07/22 0935   97.5 °F (36.4 °C) 65 16 142/65 95 %      Temp src Heart Rate Source Patient Position BP Location FiO2 (%)   12/07/22 0935 -- -- -- --   Tympanic           Physical Exam  Vitals and nursing note reviewed.   Constitutional:       General: She is in acute distress (mild).      Appearance: She is not toxic-appearing.   HENT:      Head: Normocephalic and atraumatic.   Eyes:      Extraocular Movements: EOM normal.   Cardiovascular:      Rate and Rhythm: Normal rate and regular rhythm.      Pulses: Intact distal pulses.           Posterior tibial pulses are 2+ on the right side and 2+ on the left side.      Heart sounds: Normal heart sounds. No murmur heard.  Pulmonary:      Effort: Pulmonary effort is normal. No respiratory distress.      Breath sounds: Normal breath sounds.   Abdominal:      General: Bowel sounds are normal.      Palpations: Abdomen is soft.      Tenderness: There is no abdominal tenderness. There is no guarding or rebound.   Musculoskeletal:         General: No swelling or deformity. Normal range of motion.      Cervical back: Normal range of motion and neck supple.      Right lower leg: Edema present.      Left lower leg: Edema present.       Comments: Mild tenderness to palpation L4-L5 without step-off, right paravertebral musculature right buttock discomfort with palpation, minimal discomfort with passive range of motion of right hip, right knee mildly tender medial aspect without obvious effusion, no laxity noted of right knee on exam.  Right ankle nontender,   distal range of motion, pulse, sensation intact bilateral lower extremities   Skin:     General: Skin is warm and dry.   Neurological:      General: No focal deficit present.      Mental Status: She is alert and oriented to person, place, and time.   Psychiatric:         Mood and Affect: Mood and affect normal.         LAB RESULTS  Lab Results (last 24 hours)     ** No results found for the last 24 hours. **          I ordered the above labs and reviewed the results    RADIOLOGY  XR Knee 3 View Right   Final Result      XR Hip With or Without Pelvis 2 - 3 View Right   Final Result      XR Spine Lumbar Complete 4+VW   Final Result      Degenerative changes, no acute disease    I ordered the above noted radiological studies. Interpreted by radiologist. Viewed by me in PACS.       PROCEDURES  Procedures      PROGRESS AND CONSULTS  ED Course as of 12/07/22 1218   Wed Dec 07, 2022   1217 RN reports patient ambulates well in the ER [TO]      ED Course User Index  [TO] Luly Gaines MD           MEDICAL DECISION MAKING  Results were reviewed/discussed with the patient and they were also made aware of online access. Pt also made aware that some labs, such as cultures, will not be resulted during ER visit and followup with PMD is necessary.       MDM       DIAGNOSIS  Final diagnoses:   Fall, initial encounter   Lumbar strain, initial encounter   Contusion of right hip, initial encounter   Sprain of right knee, unspecified ligament, initial encounter       DISPOSITION  DISCHARGE    Patient discharged in stable condition.    Reviewed implications of results, diagnosis, meds, responsibility to follow  up, warning signs and symptoms of possible worsening, potential complications and reasons to return to ER    Patient/Family voiced understanding of above instructions.    Discussed plan for discharge, as there is no emergent indication for admission. Patient referred to primary care provider for BP management due to today's BP. Pt/family is agreeable and understands need for follow up and repeat testing.  Pt is aware that discharge does not mean that nothing is wrong but it indicates no emergency is present that requires admission and they must continue care with follow-up as given below or physician of their choice.     FOLLOW-UP  Mariah Hickman MD  [address]  [facility] [id number] [phone]    Schedule an appointment as soon as possible for a visit in 3 days  EVEN IF WELL    Adebayo Ramos MD  [address] [facility] [id number] [phone]    Schedule an appointment as soon as possible for a visit in 1 week  As needed for persistent pain         Medication List      New Prescriptions    lidocaine 5 %  Commonly known as: LIDODERM  Place 1 patch on the skin as directed by provider Daily. Remove & Discard patch within 12 hours or as directed by MD           Where to Get Your Medications      These medications were sent to [facility] [place] [id number]    Hours: 7:00 AM-6:00 PM Mon-Fri, 8:00 AM-4:30 PM Sat-Sun (Closed 12-12:30PM) Phone: [phone]   · lidocaine 5 %           Latest Documented Vital Signs:  As of 12:18 EST  BP- 132/59 HR- 54 Temp- 97.5 °F (36.4 °C) (Tympanic) O2 sat- 93%    --  Patient was wearing facemask when I entered the room and throughout our encounter. Full protective equipment was worn throughout this patient encounter including a face mask, eye protection and gloves. Hand hygiene was performed before donning protective equipment and after removal when leaving the room.      Luly Gaines MD  12/07/22 6717

## 2022-12-07 NOTE — DISCHARGE INSTRUCTIONS
You are advised to follow closely with Dr. Hickman in 2-3 days for recheck, final results of lab work and imaging testing, and further testing/treatment as needed.    Follow with Dr. Adebayo Vila orthopedic specialist of your choice in 1 to 2 weeks as needed for persistent joint pain    Please return to the emergency department immediately with chest pain different than usual for you, shortness of air, abdominal pain, persistent vomiting/fever, blood in emesis or stool, lightheadedness/fainting, problems with speech, one sided weakness/numbness, new incontinence, problems with vision, altered mental status, unable to complete activities of daily living at home or for worsening of symptoms or other concerns.

## 2022-12-07 NOTE — TELEPHONE ENCOUNTER
Caller: ARAM    Relationship to patient: GRANDCHILD    Best call back number: [phone]    Patient is needing: PATIENT FELL THIS MORNING AND SHE IS EXPERIENCING PAIN IN HER HIP, GRANDCHILD IS CALLING TO SEE IF DR. PUENTE CAN ORDER XRAY HIP, WITHOUT GOING TO ER.      ADVISED THAT DR. PUENTE IS NOT IN OFFICE TODAY AND SHE DOESN'T WANT TO WAIT LONG.  SHE WILL PROBABLY TAKE PATIENT TO ER.\    SHE IS CONCERNED ABOUT ER BECAUSE OF FLU AND AGE OF PATIENT.    PLEASE CALL THE GRANDCHILD TO ADVISE.

## 2022-12-07 NOTE — ED NOTES
Pt slipped on floor this am, has chronic back apin, pain worse since fall. No head injury or loc. Dizziness prior to falling. R hip and back pain since fall. Normally uses walker at home. Took norco PTA    Pt placed in mask at triage, this staff member wearing appropriate ppe

## 2022-12-08 ENCOUNTER — SPECIALTY PHARMACY (OUTPATIENT)
Dept: PHARMACY | Facility: HOSPITAL | Age: 87
End: 2022-12-08

## 2022-12-08 ENCOUNTER — TELEPHONE (OUTPATIENT)
Dept: ONCOLOGY | Facility: CLINIC | Age: 87
End: 2022-12-08

## 2022-12-08 NOTE — TELEPHONE ENCOUNTER
Phoned [facility], spoke with Enid. Gave verbal orders from Dr. Sherman for weekly CBCs x8 weeks with results faxed to our office. Enid r/v orders.

## 2022-12-13 ENCOUNTER — TELEPHONE (OUTPATIENT)
Dept: NEUROSURGERY | Facility: CLINIC | Age: 87
End: 2022-12-13

## 2022-12-13 NOTE — TELEPHONE ENCOUNTER
Caller: ARAM SPAIN    Relationship to patient: Emergency Contact    Best call back number: [phone]    Type of visit: FOLLOW UP     Requested date: 12/13/22    If rescheduling, when is the original appointment: 12/15/22    Additional notes:    PATIENT'S GRAND DAUGHTER (ARAM) CALLED N AND IS REQUESTING FOR PATIENT'S APPOINTMENT ON 12/15/22 TO BE CHANGED TO A TELEVISIT.  PLEASE CALL ARAM OR PATIENT WITH AN UPDATE.  THANK YOU!

## 2022-12-15 ENCOUNTER — TELEPHONE (OUTPATIENT)
Dept: NEUROSURGERY | Facility: CLINIC | Age: 87
End: 2022-12-15

## 2022-12-20 ENCOUNTER — APPOINTMENT (OUTPATIENT)
Dept: CARDIOLOGY | Facility: HOSPITAL | Age: 87
DRG: 291 | End: 2022-12-20
Payer: MEDICARE

## 2022-12-20 ENCOUNTER — HOSPITAL ENCOUNTER (INPATIENT)
Facility: HOSPITAL | Age: 87
LOS: 4 days | Discharge: HOME HEALTH | DRG: 291 | End: 2022-12-25
Attending: EMERGENCY MEDICINE | Admitting: STUDENT IN AN ORGANIZED HEALTH CARE EDUCATION/TRAINING PROGRAM
Payer: MEDICARE

## 2022-12-20 ENCOUNTER — APPOINTMENT (OUTPATIENT)
Dept: GENERAL RADIOLOGY | Facility: HOSPITAL | Age: 87
DRG: 291 | End: 2022-12-20
Payer: MEDICARE

## 2022-12-20 ENCOUNTER — TELEPHONE (OUTPATIENT)
Dept: ONCOLOGY | Facility: CLINIC | Age: 87
End: 2022-12-20

## 2022-12-20 DIAGNOSIS — R54 AGE-RELATED PHYSICAL DEBILITY: ICD-10-CM

## 2022-12-20 DIAGNOSIS — J81.0 ACUTE PULMONARY EDEMA: ICD-10-CM

## 2022-12-20 DIAGNOSIS — S92.411A CLOSED DISPLACED FRACTURE OF PROXIMAL PHALANX OF RIGHT GREAT TOE, INITIAL ENCOUNTER: ICD-10-CM

## 2022-12-20 DIAGNOSIS — I50.31 ACUTE DIASTOLIC CONGESTIVE HEART FAILURE: Primary | ICD-10-CM

## 2022-12-20 LAB
ALBUMIN SERPL-MCNC: 3.3 G/DL (ref 3.5–5.2)
ALBUMIN/GLOB SERPL: 1.5 G/DL
ALP SERPL-CCNC: 72 U/L (ref 39–117)
ALT SERPL W P-5'-P-CCNC: 32 U/L (ref 1–33)
ANION GAP SERPL CALCULATED.3IONS-SCNC: 10.2 MMOL/L (ref 5–15)
AST SERPL-CCNC: 21 U/L (ref 1–32)
B PARAPERT DNA SPEC QL NAA+PROBE: NOT DETECTED
B PERT DNA SPEC QL NAA+PROBE: NOT DETECTED
BASOPHILS # BLD AUTO: 0.03 10*3/MM3 (ref 0–0.2)
BASOPHILS NFR BLD AUTO: 0.3 % (ref 0–1.5)
BH CV LOWER VASCULAR LEFT COMMON FEMORAL AUGMENT: NORMAL
BH CV LOWER VASCULAR LEFT COMMON FEMORAL COMPETENT: NORMAL
BH CV LOWER VASCULAR LEFT COMMON FEMORAL COMPRESS: NORMAL
BH CV LOWER VASCULAR LEFT COMMON FEMORAL PHASIC: NORMAL
BH CV LOWER VASCULAR LEFT COMMON FEMORAL SPONT: NORMAL
BH CV LOWER VASCULAR RIGHT COMMON FEMORAL AUGMENT: NORMAL
BH CV LOWER VASCULAR RIGHT COMMON FEMORAL COMPETENT: NORMAL
BH CV LOWER VASCULAR RIGHT COMMON FEMORAL COMPRESS: NORMAL
BH CV LOWER VASCULAR RIGHT COMMON FEMORAL PHASIC: NORMAL
BH CV LOWER VASCULAR RIGHT COMMON FEMORAL SPONT: NORMAL
BH CV LOWER VASCULAR RIGHT DISTAL FEMORAL COMPRESS: NORMAL
BH CV LOWER VASCULAR RIGHT GASTRONEMIUS COMPRESS: NORMAL
BH CV LOWER VASCULAR RIGHT GREATER SAPH AK COMPRESS: NORMAL
BH CV LOWER VASCULAR RIGHT GREATER SAPH BK COMPRESS: NORMAL
BH CV LOWER VASCULAR RIGHT LESSER SAPH COMPRESS: NORMAL
BH CV LOWER VASCULAR RIGHT MID FEMORAL AUGMENT: NORMAL
BH CV LOWER VASCULAR RIGHT MID FEMORAL COMPETENT: NORMAL
BH CV LOWER VASCULAR RIGHT MID FEMORAL COMPRESS: NORMAL
BH CV LOWER VASCULAR RIGHT MID FEMORAL PHASIC: NORMAL
BH CV LOWER VASCULAR RIGHT MID FEMORAL SPONT: NORMAL
BH CV LOWER VASCULAR RIGHT PERONEAL COMPRESS: NORMAL
BH CV LOWER VASCULAR RIGHT POPLITEAL AUGMENT: NORMAL
BH CV LOWER VASCULAR RIGHT POPLITEAL COMPETENT: NORMAL
BH CV LOWER VASCULAR RIGHT POPLITEAL COMPRESS: NORMAL
BH CV LOWER VASCULAR RIGHT POPLITEAL PHASIC: NORMAL
BH CV LOWER VASCULAR RIGHT POPLITEAL SPONT: NORMAL
BH CV LOWER VASCULAR RIGHT POSTERIOR TIBIAL COMPRESS: NORMAL
BH CV LOWER VASCULAR RIGHT PROFUNDA FEMORAL COMPRESS: NORMAL
BH CV LOWER VASCULAR RIGHT PROXIMAL FEMORAL COMPRESS: NORMAL
BH CV LOWER VASCULAR RIGHT SAPHENOFEMORAL JUNCTION COMPRESS: NORMAL
BH CV LOWER VASCULAR RIGHT SOLEAL COMPRESS: NORMAL
BILIRUB SERPL-MCNC: 0.7 MG/DL (ref 0–1.2)
BILIRUB UR QL STRIP: NEGATIVE
BUN SERPL-MCNC: 30 MG/DL (ref 8–23)
BUN/CREAT SERPL: 18.9 (ref 7–25)
C PNEUM DNA NPH QL NAA+NON-PROBE: NOT DETECTED
CALCIUM SPEC-SCNC: 8.7 MG/DL (ref 8.2–9.6)
CHLORIDE SERPL-SCNC: 99 MMOL/L (ref 98–107)
CLARITY UR: CLEAR
CO2 SERPL-SCNC: 26.8 MMOL/L (ref 22–29)
COLOR UR: YELLOW
CREAT SERPL-MCNC: 1.59 MG/DL (ref 0.57–1)
DEPRECATED RDW RBC AUTO: 48.1 FL (ref 37–54)
EGFRCR SERPLBLD CKD-EPI 2021: 30.6 ML/MIN/1.73
EOSINOPHIL # BLD AUTO: 0.03 10*3/MM3 (ref 0–0.4)
EOSINOPHIL NFR BLD AUTO: 0.3 % (ref 0.3–6.2)
ERYTHROCYTE [DISTWIDTH] IN BLOOD BY AUTOMATED COUNT: 15 % (ref 12.3–15.4)
FLUAV SUBTYP SPEC NAA+PROBE: NOT DETECTED
FLUBV RNA ISLT QL NAA+PROBE: NOT DETECTED
GLOBULIN UR ELPH-MCNC: 2.2 GM/DL
GLUCOSE BLDC GLUCOMTR-MCNC: 276 MG/DL (ref 70–130)
GLUCOSE BLDC GLUCOMTR-MCNC: 297 MG/DL (ref 70–130)
GLUCOSE SERPL-MCNC: 325 MG/DL (ref 65–99)
GLUCOSE UR STRIP-MCNC: ABNORMAL MG/DL
HADV DNA SPEC NAA+PROBE: NOT DETECTED
HCOV 229E RNA SPEC QL NAA+PROBE: NOT DETECTED
HCOV HKU1 RNA SPEC QL NAA+PROBE: NOT DETECTED
HCOV NL63 RNA SPEC QL NAA+PROBE: NOT DETECTED
HCOV OC43 RNA SPEC QL NAA+PROBE: NOT DETECTED
HCT VFR BLD AUTO: 39.3 % (ref 34–46.6)
HGB BLD-MCNC: 12.9 G/DL (ref 12–15.9)
HGB UR QL STRIP.AUTO: NEGATIVE
HMPV RNA NPH QL NAA+NON-PROBE: NOT DETECTED
HPIV1 RNA ISLT QL NAA+PROBE: NOT DETECTED
HPIV2 RNA SPEC QL NAA+PROBE: NOT DETECTED
HPIV3 RNA NPH QL NAA+PROBE: NOT DETECTED
HPIV4 P GENE NPH QL NAA+PROBE: NOT DETECTED
KETONES UR QL STRIP: NEGATIVE
LEUKOCYTE ESTERASE UR QL STRIP.AUTO: NEGATIVE
LYMPHOCYTES # BLD AUTO: 1.19 10*3/MM3 (ref 0.7–3.1)
LYMPHOCYTES NFR BLD AUTO: 13.3 % (ref 19.6–45.3)
M PNEUMO IGG SER IA-ACNC: NOT DETECTED
MAGNESIUM SERPL-MCNC: 1.9 MG/DL (ref 1.7–2.3)
MAXIMAL PREDICTED HEART RATE: 129 BPM
MCH RBC QN AUTO: 28.6 PG (ref 26.6–33)
MCHC RBC AUTO-ENTMCNC: 32.8 G/DL (ref 31.5–35.7)
MCV RBC AUTO: 87.1 FL (ref 79–97)
MONOCYTES # BLD AUTO: 0.26 10*3/MM3 (ref 0.1–0.9)
MONOCYTES NFR BLD AUTO: 2.9 % (ref 5–12)
NEUTROPHILS NFR BLD AUTO: 7.4 10*3/MM3 (ref 1.7–7)
NEUTROPHILS NFR BLD AUTO: 82.6 % (ref 42.7–76)
NITRITE UR QL STRIP: NEGATIVE
NT-PROBNP SERPL-MCNC: 4161 PG/ML (ref 0–1800)
PH UR STRIP.AUTO: 6.5 [PH] (ref 5–8)
PLATELET # BLD AUTO: 69 10*3/MM3 (ref 140–450)
PMV BLD AUTO: 11.6 FL (ref 6–12)
POTASSIUM SERPL-SCNC: 4.5 MMOL/L (ref 3.5–5.2)
PROCALCITONIN SERPL-MCNC: 0.13 NG/ML (ref 0–0.25)
PROT SERPL-MCNC: 5.5 G/DL (ref 6–8.5)
PROT UR QL STRIP: ABNORMAL
QT INTERVAL: 408 MS
QT INTERVAL: 424 MS
QT INTERVAL: 439 MS
RBC # BLD AUTO: 4.51 10*6/MM3 (ref 3.77–5.28)
RHINOVIRUS RNA SPEC NAA+PROBE: NOT DETECTED
RSV RNA NPH QL NAA+NON-PROBE: NOT DETECTED
SARS-COV-2 RNA NPH QL NAA+NON-PROBE: NOT DETECTED
SODIUM SERPL-SCNC: 136 MMOL/L (ref 136–145)
SP GR UR STRIP: 1.01 (ref 1–1.03)
STRESS TARGET HR: 110 BPM
TROPONIN T SERPL-MCNC: 0.05 NG/ML (ref 0–0.03)
TROPONIN T SERPL-MCNC: 0.05 NG/ML (ref 0–0.03)
UROBILINOGEN UR QL STRIP: ABNORMAL
WBC NRBC COR # BLD: 8.96 10*3/MM3 (ref 3.4–10.8)

## 2022-12-20 PROCEDURE — 85025 COMPLETE CBC W/AUTO DIFF WBC: CPT | Performed by: NURSE PRACTITIONER

## 2022-12-20 PROCEDURE — 83735 ASSAY OF MAGNESIUM: CPT | Performed by: NURSE PRACTITIONER

## 2022-12-20 PROCEDURE — 80053 COMPREHEN METABOLIC PANEL: CPT | Performed by: NURSE PRACTITIONER

## 2022-12-20 PROCEDURE — 81003 URINALYSIS AUTO W/O SCOPE: CPT | Performed by: NURSE PRACTITIONER

## 2022-12-20 PROCEDURE — 73630 X-RAY EXAM OF FOOT: CPT

## 2022-12-20 PROCEDURE — P9612 CATHETERIZE FOR URINE SPEC: HCPCS

## 2022-12-20 PROCEDURE — 82962 GLUCOSE BLOOD TEST: CPT

## 2022-12-20 PROCEDURE — 99285 EMERGENCY DEPT VISIT HI MDM: CPT

## 2022-12-20 PROCEDURE — 0202U NFCT DS 22 TRGT SARS-COV-2: CPT | Performed by: EMERGENCY MEDICINE

## 2022-12-20 PROCEDURE — 93971 EXTREMITY STUDY: CPT

## 2022-12-20 PROCEDURE — 83880 ASSAY OF NATRIURETIC PEPTIDE: CPT | Performed by: NURSE PRACTITIONER

## 2022-12-20 PROCEDURE — 25010000002 FUROSEMIDE PER 20 MG: Performed by: EMERGENCY MEDICINE

## 2022-12-20 PROCEDURE — 93005 ELECTROCARDIOGRAM TRACING: CPT | Performed by: NURSE PRACTITIONER

## 2022-12-20 PROCEDURE — 93010 ELECTROCARDIOGRAM REPORT: CPT | Performed by: INTERNAL MEDICINE

## 2022-12-20 PROCEDURE — 25010000002 FUROSEMIDE PER 20 MG: Performed by: INTERNAL MEDICINE

## 2022-12-20 PROCEDURE — 99284 EMERGENCY DEPT VISIT MOD MDM: CPT

## 2022-12-20 PROCEDURE — 71045 X-RAY EXAM CHEST 1 VIEW: CPT

## 2022-12-20 PROCEDURE — 93005 ELECTROCARDIOGRAM TRACING: CPT | Performed by: EMERGENCY MEDICINE

## 2022-12-20 PROCEDURE — 93010 ELECTROCARDIOGRAM REPORT: CPT | Performed by: STUDENT IN AN ORGANIZED HEALTH CARE EDUCATION/TRAINING PROGRAM

## 2022-12-20 PROCEDURE — 93005 ELECTROCARDIOGRAM TRACING: CPT | Performed by: HOSPITALIST

## 2022-12-20 PROCEDURE — 36415 COLL VENOUS BLD VENIPUNCTURE: CPT

## 2022-12-20 PROCEDURE — 84484 ASSAY OF TROPONIN QUANT: CPT | Performed by: NURSE PRACTITIONER

## 2022-12-20 PROCEDURE — G0378 HOSPITAL OBSERVATION PER HR: HCPCS

## 2022-12-20 PROCEDURE — 84484 ASSAY OF TROPONIN QUANT: CPT | Performed by: INTERNAL MEDICINE

## 2022-12-20 PROCEDURE — 84145 PROCALCITONIN (PCT): CPT | Performed by: EMERGENCY MEDICINE

## 2022-12-20 RX ORDER — DEXTROSE MONOHYDRATE 25 G/50ML
25 INJECTION, SOLUTION INTRAVENOUS
Status: DISCONTINUED | OUTPATIENT
Start: 2022-12-20 | End: 2022-12-25 | Stop reason: HOSPADM

## 2022-12-20 RX ORDER — ACETAMINOPHEN 325 MG/1
650 TABLET ORAL EVERY 4 HOURS PRN
Status: DISCONTINUED | OUTPATIENT
Start: 2022-12-20 | End: 2022-12-25 | Stop reason: HOSPADM

## 2022-12-20 RX ORDER — ONDANSETRON 2 MG/ML
4 INJECTION INTRAMUSCULAR; INTRAVENOUS EVERY 6 HOURS PRN
Status: DISCONTINUED | OUTPATIENT
Start: 2022-12-20 | End: 2022-12-25 | Stop reason: HOSPADM

## 2022-12-20 RX ORDER — LIDOCAINE 50 MG/G
1 PATCH TOPICAL EVERY 24 HOURS
Status: DISCONTINUED | OUTPATIENT
Start: 2022-12-20 | End: 2022-12-25 | Stop reason: HOSPADM

## 2022-12-20 RX ORDER — HYDRALAZINE HYDROCHLORIDE 50 MG/1
50 TABLET, FILM COATED ORAL 2 TIMES DAILY
Status: DISCONTINUED | OUTPATIENT
Start: 2022-12-20 | End: 2022-12-25 | Stop reason: HOSPADM

## 2022-12-20 RX ORDER — UREA 10 %
3 LOTION (ML) TOPICAL NIGHTLY PRN
Status: DISCONTINUED | OUTPATIENT
Start: 2022-12-20 | End: 2022-12-25 | Stop reason: HOSPADM

## 2022-12-20 RX ORDER — INSULIN LISPRO 100 [IU]/ML
0-9 INJECTION, SOLUTION INTRAVENOUS; SUBCUTANEOUS
Status: DISCONTINUED | OUTPATIENT
Start: 2022-12-21 | End: 2022-12-22

## 2022-12-20 RX ORDER — CARVEDILOL 12.5 MG/1
12.5 TABLET ORAL 2 TIMES DAILY
Status: DISCONTINUED | OUTPATIENT
Start: 2022-12-20 | End: 2022-12-25 | Stop reason: HOSPADM

## 2022-12-20 RX ORDER — FAMOTIDINE 20 MG/1
10 TABLET, FILM COATED ORAL 2 TIMES DAILY
Status: DISCONTINUED | OUTPATIENT
Start: 2022-12-20 | End: 2022-12-23

## 2022-12-20 RX ORDER — METHOCARBAMOL 750 MG/1
750 TABLET, FILM COATED ORAL EVERY 8 HOURS PRN
Status: DISCONTINUED | OUTPATIENT
Start: 2022-12-20 | End: 2022-12-25 | Stop reason: HOSPADM

## 2022-12-20 RX ORDER — MONTELUKAST SODIUM 10 MG/1
10 TABLET ORAL NIGHTLY
Status: DISCONTINUED | OUTPATIENT
Start: 2022-12-20 | End: 2022-12-25 | Stop reason: HOSPADM

## 2022-12-20 RX ORDER — GABAPENTIN 300 MG/1
600 CAPSULE ORAL 2 TIMES DAILY
Status: DISCONTINUED | OUTPATIENT
Start: 2022-12-20 | End: 2022-12-25 | Stop reason: HOSPADM

## 2022-12-20 RX ORDER — AMOXICILLIN 250 MG
1 CAPSULE ORAL DAILY
Status: DISCONTINUED | OUTPATIENT
Start: 2022-12-21 | End: 2022-12-25 | Stop reason: HOSPADM

## 2022-12-20 RX ORDER — FUROSEMIDE 10 MG/ML
80 INJECTION INTRAMUSCULAR; INTRAVENOUS ONCE
Status: COMPLETED | OUTPATIENT
Start: 2022-12-20 | End: 2022-12-20

## 2022-12-20 RX ORDER — TAMSULOSIN HYDROCHLORIDE 0.4 MG/1
0.4 CAPSULE ORAL DAILY
Status: DISCONTINUED | OUTPATIENT
Start: 2022-12-21 | End: 2022-12-25 | Stop reason: HOSPADM

## 2022-12-20 RX ORDER — SODIUM CHLORIDE 0.9 % (FLUSH) 0.9 %
10 SYRINGE (ML) INJECTION AS NEEDED
Status: DISCONTINUED | OUTPATIENT
Start: 2022-12-20 | End: 2022-12-25 | Stop reason: HOSPADM

## 2022-12-20 RX ORDER — LORAZEPAM 0.5 MG/1
0.5 TABLET ORAL EVERY 8 HOURS PRN
Status: DISCONTINUED | OUTPATIENT
Start: 2022-12-20 | End: 2022-12-25 | Stop reason: HOSPADM

## 2022-12-20 RX ORDER — FUROSEMIDE 10 MG/ML
40 INJECTION INTRAMUSCULAR; INTRAVENOUS EVERY 12 HOURS
Status: DISCONTINUED | OUTPATIENT
Start: 2022-12-20 | End: 2022-12-22

## 2022-12-20 RX ORDER — NITROGLYCERIN 0.4 MG/1
0.4 TABLET SUBLINGUAL
Status: DISCONTINUED | OUTPATIENT
Start: 2022-12-20 | End: 2022-12-25 | Stop reason: HOSPADM

## 2022-12-20 RX ORDER — POLYETHYLENE GLYCOL 3350 17 G/17G
17 POWDER, FOR SOLUTION ORAL DAILY
Status: DISCONTINUED | OUTPATIENT
Start: 2022-12-21 | End: 2022-12-25 | Stop reason: HOSPADM

## 2022-12-20 RX ORDER — MELATONIN
1000 DAILY
Status: DISCONTINUED | OUTPATIENT
Start: 2022-12-21 | End: 2022-12-25 | Stop reason: HOSPADM

## 2022-12-20 RX ORDER — LEVOTHYROXINE SODIUM 0.05 MG/1
50 TABLET ORAL DAILY
Status: DISCONTINUED | OUTPATIENT
Start: 2022-12-21 | End: 2022-12-25 | Stop reason: HOSPADM

## 2022-12-20 RX ORDER — HYDROCODONE BITARTRATE AND ACETAMINOPHEN 7.5; 325 MG/1; MG/1
1 TABLET ORAL EVERY 6 HOURS
Status: DISCONTINUED | OUTPATIENT
Start: 2022-12-20 | End: 2022-12-25 | Stop reason: HOSPADM

## 2022-12-20 RX ORDER — PREDNISONE 10 MG/1
10 TABLET ORAL
Status: DISCONTINUED | OUTPATIENT
Start: 2022-12-21 | End: 2022-12-25 | Stop reason: HOSPADM

## 2022-12-20 RX ORDER — AMLODIPINE BESYLATE 10 MG/1
10 TABLET ORAL DAILY
Status: DISCONTINUED | OUTPATIENT
Start: 2022-12-21 | End: 2022-12-25 | Stop reason: HOSPADM

## 2022-12-20 RX ORDER — ONDANSETRON 4 MG/1
4 TABLET, FILM COATED ORAL EVERY 6 HOURS PRN
Status: DISCONTINUED | OUTPATIENT
Start: 2022-12-20 | End: 2022-12-25 | Stop reason: HOSPADM

## 2022-12-20 RX ORDER — SODIUM BICARBONATE 650 MG/1
650 TABLET ORAL 2 TIMES DAILY
Status: DISCONTINUED | OUTPATIENT
Start: 2022-12-20 | End: 2022-12-21

## 2022-12-20 RX ORDER — NICOTINE POLACRILEX 4 MG
15 LOZENGE BUCCAL
Status: DISCONTINUED | OUTPATIENT
Start: 2022-12-20 | End: 2022-12-25 | Stop reason: HOSPADM

## 2022-12-20 RX ADMIN — FUROSEMIDE 40 MG: 10 INJECTION, SOLUTION INTRAMUSCULAR; INTRAVENOUS at 21:10

## 2022-12-20 RX ADMIN — HYDROCODONE BITARTRATE AND ACETAMINOPHEN 1 TABLET: 7.5; 325 TABLET ORAL at 21:54

## 2022-12-20 RX ADMIN — FAMOTIDINE 10 MG: 20 TABLET, FILM COATED ORAL at 21:54

## 2022-12-20 RX ADMIN — FUROSEMIDE 80 MG: 10 INJECTION, SOLUTION INTRAMUSCULAR; INTRAVENOUS at 14:58

## 2022-12-20 RX ADMIN — GABAPENTIN 600 MG: 300 CAPSULE ORAL at 21:54

## 2022-12-20 RX ADMIN — INSULIN GLARGINE-YFGN 20 UNITS: 100 INJECTION, SOLUTION SUBCUTANEOUS at 21:54

## 2022-12-20 RX ADMIN — Medication 10 ML: at 21:11

## 2022-12-20 NOTE — PROGRESS NOTES
Right leg venous Doppler study preliminary report: negative for deep vein thrombosis.  Called preliminary report to Shikha Pozo, APRN

## 2022-12-20 NOTE — TELEPHONE ENCOUNTER
"Reviewed labs with Dr. Sherman and returned call to family member with the information as follows:  \"No change, continue prednisone 10 mg daily and continue monitoring counts.  We need to make sure that they forward these to us.\"  Family member v/u.  I gave her the correct fax number for her labs to be sent to our office.   "

## 2022-12-20 NOTE — TELEPHONE ENCOUNTER
I returned call to family member to let her know we had not received any lab results from [facility].  She was unhappy and stated that she was tired of this and she wished everyone could just do their job the right way.  She stated that Dr. Sherman wanted these labs.  I continued to look through her chart and there are lab results on 12/15/2022 in [facility] Every[facility] performed at [facility] on 12/15/2022. Have Dr Sherman review and I can call family member back.

## 2022-12-20 NOTE — ED NOTES
Nursing report ED to floor  Helena Carmen  89 y.o.  female    HPI :   Chief Complaint   Patient presents with    Ankle Pain    Leg Pain    Hip Pain    Fall       Admitting doctor:   Mango Arnold MD    Admitting diagnosis:   The primary encounter diagnosis was Acute diastolic congestive heart failure (HCC). Diagnoses of Acute pulmonary edema (HCC) and Closed displaced fracture of proximal phalanx of right great toe, initial encounter were also pertinent to this visit.    Code status:   Current Code Status       Date Active Code Status Order ID Comments User Context       Prior            Allergies:   Erythromycin, Statins, Cephalexin, Penicillins, and Sulfa antibiotics    Isolation:   Enhanced Droplet/Contact     Intake and Output  No intake or output data in the 24 hours ending 12/20/22 1538    Weight:       12/20/22  1059   Weight: 75.8 kg (167 lb)       Most recent vitals:   Vitals:    12/20/22 1345 12/20/22 1401 12/20/22 1402 12/20/22 1458   BP:  150/57  164/69   Pulse: 70 82  73   Resp:       Temp:       TempSrc:       SpO2: 94%  94%    Weight:       Height:           Active LDAs/IV Access:   Lines, Drains & Airways       Active LDAs       Name Placement date Placement time Site Days    Peripheral IV 12/20/22 1206 Right;Posterior Forearm 12/20/22  1206  Forearm  less than 1                    Labs (abnormal labs have a star):   Labs Reviewed   COMPREHENSIVE METABOLIC PANEL - Abnormal; Notable for the following components:       Result Value    Glucose 325 (*)     BUN 30 (*)     Creatinine 1.59 (*)     Total Protein 5.5 (*)     Albumin 3.30 (*)     eGFR 30.6 (*)     All other components within normal limits    Narrative:     GFR Normal >60  Chronic Kidney Disease <60  Kidney Failure <15    The GFR formula is only valid for adults with stable renal function between ages 18 and 70.   URINALYSIS W/ MICROSCOPIC IF INDICATED (NO CULTURE) - Abnormal; Notable for the following components:    Glucose,  mg/dL (1+)  (*)     Protein, UA Trace (*)     All other components within normal limits    Narrative:     Urine microscopic not indicated.   BNP (IN-HOUSE) - Abnormal; Notable for the following components:    proBNP 4,161.0 (*)     All other components within normal limits    Narrative:     Among patients with dyspnea, NT-proBNP is highly sensitive for the detection of acute congestive heart failure. In addition NT-proBNP of <300 pg/ml effectively rules out acute congestive heart failure with 99% negative predictive value.    Results may be falsely decreased if patient taking Biotin.     TROPONIN (IN-HOUSE) - Abnormal; Notable for the following components:    Troponin T 0.051 (*)     All other components within normal limits    Narrative:     Troponin T Reference Range:  <= 0.03 ng/mL-   Negative for AMI  >0.03 ng/mL-     Abnormal for myocardial necrosis.  Clinicians would have to utilize clinical acumen, EKG, Troponin and serial changes to determine if it is an Acute Myocardial Infarction or myocardial injury due to an underlying chronic condition.       Results may be falsely decreased if patient taking Biotin.     CBC WITH AUTO DIFFERENTIAL - Abnormal; Notable for the following components:    Platelets 69 (*)     Neutrophil % 82.6 (*)     Lymphocyte % 13.3 (*)     Monocyte % 2.9 (*)     Neutrophils, Absolute 7.40 (*)     All other components within normal limits   MAGNESIUM - Normal   PROCALCITONIN - Normal    Narrative:     As a Marker for Sepsis (Non-Neonates):    1. <0.5 ng/mL represents a low risk of severe sepsis and/or septic shock.  2. >2 ng/mL represents a high risk of severe sepsis and/or septic shock.    As a Marker for Lower Respiratory Tract Infections that require antibiotic therapy:    PCT on Admission    Antibiotic Therapy       6-12 Hrs later    >0.5                Strongly Recommended  >0.25 - <0.5        Recommended   0.1 - 0.25          Discouraged              Remeasure/reassess PCT  <0.1                 Strongly Discouraged     Remeasure/reassess PCT    As 28 day mortality risk marker: \"Change in Procalcitonin Result\" (>80% or <=80%) if Day 0 (or Day 1) and Day 4 values are available. Refer to http://www.[facility]-pct-calculator.com    Change in PCT <=80%  A decrease of PCT levels below or equal to 80% defines a positive change in PCT test result representing a higher risk for 28-day all-cause mortality of patients diagnosed with severe sepsis for septic shock.    Change in PCT >80%  A decrease of PCT levels of more than 80% defines a negative change in PCT result representing a lower risk for 28-day all-cause mortality of patients diagnosed with severe sepsis or septic shock.      RESPIRATORY PANEL PCR W/ COVID-19 (SARS-COV-2) LATONIA/CHARLIE/ALEJANDRO/PAD/COR/MAD/PASQUALE IN-HOUSE, NP SWAB IN UT/VTP, 3-4 HR TAT   CBC AND DIFFERENTIAL    Narrative:     The following orders were created for panel order CBC & Differential.  Procedure                               Abnormality         Status                     ---------                               -----------         ------                     CBC Auto Differential[[id number]]        Abnormal            Final result                 Please view results for these tests on the individual orders.       EKG:   ECG 12 Lead Dyspnea   Final Result   HEART RATE= 81  bpm   RR Interval= 741  ms   MO Interval= 146  ms   P Horizontal Axis= 68  deg   P Front Axis= 251  deg   QRSD Interval= 105  ms   QT Interval= 439  ms   QRS Axis= 82  deg   T Wave Axis= -36  deg   - ABNORMAL ECG -   Sinus rhythm   Atrial premature complexes   Nonspecific repol abnormality, diffuse leads   When compared with ECG of 07-Aug-2022 8:52:40,   There is more ectopy   Electronically Signed By: Geremias Downey ([facility]) 20-Dec-2022 15:32:26   Date and Time of Study: 2022-12-20 13:38:44      ECG 12 Lead Rhythm Change    (Results Pending)       Meds given in ED:   Medications   sodium chloride 0.9 % flush 10 mL (has no administration in  time range)   furosemide (LASIX) injection 80 mg (80 mg Intravenous Given 22 9548)       Imaging results:  XR Foot 3+ View Right    Result Date: 2022  1. Fracture of the proximal phalanx of the right great toe.   This report was finalized on 2022 12:32 PM by Dr. Herbie Pike M.D.       Ambulatory status:   - assist    Social issues:   Social History     Socioeconomic History    Marital status:    Tobacco Use    Smoking status: Former     Packs/day: 0.50     Types: Cigarettes     Quit date: 7/10/1969     Years since quittin.4    Smokeless tobacco: Never   Vaping Use    Vaping Use: Never used   Substance and Sexual Activity    Alcohol use: No     Comment: caffeine use - coffee 2 cups daily    Drug use: No    Sexual activity: Defer       NIH Stroke Scale:         Rebeca Blakely RN  22 15:38 EST

## 2022-12-20 NOTE — ED TRIAGE NOTES
Pt arrives in a wheelchair to triage for right ankle, hip and knee pain. Pt fell two weeks ago and was seen for her fall and found no broken bones. Pt has swelling and bruising to her right foot and ankle which they state was not x-ray when she came in two weeks ago. Pt can walk but with pain. Pt has history of blood clots in both of her legs.

## 2022-12-20 NOTE — TELEPHONE ENCOUNTER
Caller: Radha Hoyos    Relationship: Emergency Contact    Best call back number: [phone]    Caller requesting test results: YES    What test was performed: BLOOD WORK    When was the test performed: 12/15/22    Where was the test performed: [facility]

## 2022-12-20 NOTE — ED PROVIDER NOTES
EMERGENCY DEPARTMENT ENCOUNTER    Room Number:  28/28  Date of encounter:  12/20/2022  PCP: Mariah Hickman MD  Historian: Patient and daughtero      PPE    Patient was placed in face mask in first look. Patient was wearing facemask when I entered the room and throughout our encounter. I wore full protective equipment throughout this patient encounter including a face mask, and gloves. Hand hygiene was performed before donning protective equipment and after removal when leaving the room.        HPI:  Chief Complaint: Multiple complaints  A complete HPI/ROS/PMH/PSH/SH/FH are unobtainable due to: Nothing    Context: Helena Carmen is a 89 y.o. female with a history of aortic valve stenosis, HTN, CKD, DM, CHF, previous DVTs who arrives to the ED via private vehicle from home with her daughter at bedside.  Patient states that she was seen here on December 7 after a slip and fall.  She states prior to that she was being treated for a L5 compression fracture by neurosurgery.  She states that since the fall she has continued to have pain in her right foot.  She is also having swelling in her right lower leg and bruising.  She states that she is short of breath, states even just taking a few steps to her bathroom she becomes very short of breath and has to sit down.  She denies chest pain, fever, chills.  She states she is still having pain in her low back, right hip and knee.  Patient states nothing makes this better and exertion and weightbearing make her symptoms worse.      PAST MEDICAL HISTORY  Active Ambulatory Problems     Diagnosis Date Noted   • Aortic valve stenosis 04/05/2016   • Fatigue 04/05/2016   • MI (mitral incompetence) 04/05/2016   • PVC (premature ventricular contraction) 04/05/2016   • Legally blind 05/25/2018   • Essential hypertension 05/25/2018   • Hypertriglyceridemia 05/31/2018   • Pulmonary hypertension (HCC) 06/25/2020   • Paroxysmal atrial fibrillation (HCC) 06/25/2020   • Anxiety  07/10/2014   • Stage 4 chronic kidney disease ([facility]) 03/20/2015   • Chronic pain disorder 01/26/2022   • Degeneration of lumbar intervertebral disc 09/09/2014   • Type 2 diabetes mellitus with hyperglycemia ([facility]) 01/26/2022   • Esophageal reflux 07/10/2014   • Gastroparesis 01/26/2022   • Hiatal hernia 01/26/2022   • Iatrogenic hypothyroidism 07/08/2014   • Macular degeneration 01/26/2022   • Malignant neoplasm of uterus ([facility]) 01/26/2022   • Osteoarthritis of knee 07/10/2014   • Peripheral nerve disease 07/10/2014   • Secondary hyperparathyroidism ([facility]) 04/05/2016   • Thrombocytopenia ([facility]) 07/11/2014   • Chronic heart failure with preserved ejection fraction ([facility]) 03/02/2022   • Other cirrhosis of liver ([facility]) 03/03/2022   • Hypothyroidism 02/22/2022   • Nonrheumatic tricuspid valve regurgitation 02/22/2022   • Anemia, chronic disease 07/30/2022   • Closed fracture of fifth lumbar vertebra ([facility]) 08/24/2022   • Closed fracture of fifth lumbar vertebra, unspecified fracture morphology, initial encounter ([facility]) 08/25/2022   • Diabetic polyneuropathy associated with type 2 diabetes mellitus ([facility]) 08/26/2022   • Chronic ITP (idiopathic thrombocytopenia) ([facility]) 08/28/2022   • Chronic midline low back pain with bilateral sciatica 10/16/2022   • Acute deep vein thrombosis of calf, bilateral ([facility]) 10/17/2022     Resolved Ambulatory Problems     Diagnosis Date Noted   • Diastolic dysfunction 04/05/2016   • Hypertensive pulmonary vascular disease ([facility]) 04/05/2016   • Bioprosthetic aortic valve replacement 05/10/2017   • Breast screening 07/08/2014   • Abdominal pain, right lower quadrant 12/13/2020   • Allergic rhinitis 07/10/2014   • Angina pectoris ([facility]) 07/10/2014   • Pancreatitis 01/26/2022   • Colitis due to Clostridioides difficile 01/26/2022   • Hypothyroidism 01/26/2022   • Long term (current) use of opiate analgesic 01/26/2022   • Uncontrolled type 2 diabetes mellitus 07/08/2014   • Benign essential hypertension  07/08/2014   • Hypertension 01/26/2022   • Pulmonary hypertension ([facility]) 04/05/2016   • Hypercholesterolemia 01/26/2022   • Hyperlipidemia 07/08/2014   • Legal blindness 05/25/2018   • Mitral valve disorder 07/10/2014   • Mitral valve regurgitation 04/05/2016   • Ventricular premature beats 04/05/2016   • History of aortic valve replacement 05/10/2017   • Nonrheumatic tricuspid valve regurgitation    • Elevated serum creatinine    • CHF (congestive heart failure) ([facility]) 03/02/2022   • Hypertensive disorder 02/22/2022   • Pancreatitis 02/22/2022   • Sepsis ([facility]) 06/05/2022   • COVID-19 07/30/2022   • Hyponatremia 07/30/2022   • Weakness generalized 07/30/2022   • Leukopenia 08/02/2022   • Cytokine release syndrome, grade 1 08/02/2022   • Urine retention 08/02/2022   • COVID-19 virus infection 08/06/2022   • Acute DVT (deep venous thrombosis) ([facility]) 08/07/2022   • Lumbar compression fracture, closed, initial encounter ([facility]) 10/17/2022     Past Medical History:   Diagnosis Date   • Back pain    • CKD (chronic kidney disease)    • Diverticulosis    • Exertional shortness of breath    • Heart disease    • Hyperthyroidism    • Left ventricular hypertrophy    • Liver disease    • Mitral regurgitation    • Osteoarthritis of hip    • Premature ventricular contractions    • Renal insufficiency syndrome    • Type 2 diabetes mellitus ([facility])    • Uterine cancer ([facility])          PAST SURGICAL HISTORY  Past Surgical History:   Procedure Laterality Date   • AORTIC VALVE REPAIR/REPLACEMENT     • CATARACT EXTRACTION      1970, 1999   • CHOLECYSTECTOMY     • ENDOSCOPY  08/15/2014    no gross lesions in stomach/duodenum, erythrematous mucosa in stomach   • HYSTERECTOMY  2007   • STERNOTOMY           FAMILY HISTORY  Family History   Problem Relation Age of Onset   • Heart disease Mother    • Hypertension Mother    • Stroke Mother    • Diabetes Mother         mellitus   • Other Other         cardiovascular disorder         SOCIAL  HISTORY  Social History     Socioeconomic History   • Marital status:    Tobacco Use   • Smoking status: Former     Packs/day: 0.50     Types: Cigarettes     Quit date: 7/10/1969     Years since quittin.4   • Smokeless tobacco: Never   Vaping Use   • Vaping Use: Never used   Substance and Sexual Activity   • Alcohol use: No     Comment: caffeine use - coffee 2 cups daily   • Drug use: No   • Sexual activity: Defer         ALLERGIES  Erythromycin, Statins, Cephalexin, Penicillins, and Sulfa antibiotics        REVIEW OF SYSTEMS  Review of Systems     All systems reviewed and negative except for those discussed in HPI.        PHYSICAL EXAM    ED Triage Vitals   Temp Heart Rate Resp BP SpO2   22 1059 22 1059 22 1059 22 1106 22 1059   99.8 °F (37.7 °C) 64 16 157/60 93 %       Physical Exam  GENERAL: Well appearing, nontoxic appearing, mildly distressed  HENT: normocephalic, atraumatic  EYES: no scleral icterus, PERRL  CV: regular rhythm, regular rate, no murmur  RESPIRATORY: normal effort, CTAB  ABDOMEN: soft   MUSCULOSKELETAL:   Patient has swelling, ecchymosis to her right foot into the lateral aspect of her right lower leg.  Tenderness to palpation.  Palpable 2+ DP and PT pulse on the right.  Lumbar tenderness to palpation, no cervical or thoracic  NEURO: alert, moves all extremities, follows commands, mental status normal/baseline  SKIN: warm, dry, no rash   Psych: Appropriate mood and affect  Nursing notes and vital signs reviewed      LAB RESULTS  Recent Results (from the past 24 hour(s))   BNP    Collection Time: 22 12:07 PM    Specimen: Blood   Result Value Ref Range    proBNP 4,161.0 (H) 0.0 - 1,800.0 pg/mL   Magnesium    Collection Time: 22 12:07 PM    Specimen: Blood   Result Value Ref Range    Magnesium 1.9 1.7 - 2.3 mg/dL   CBC Auto Differential    Collection Time: 22 12:07 PM    Specimen: Blood   Result Value Ref Range    WBC 8.96 3.40 - 10.80  10*3/mm3    RBC 4.51 3.77 - 5.28 10*6/mm3    Hemoglobin 12.9 12.0 - 15.9 g/dL    Hematocrit 39.3 34.0 - 46.6 %    MCV 87.1 79.0 - 97.0 fL    MCH 28.6 26.6 - 33.0 pg    MCHC 32.8 31.5 - 35.7 g/dL    RDW 15.0 12.3 - 15.4 %    RDW-SD 48.1 37.0 - 54.0 fl    MPV 11.6 6.0 - 12.0 fL    Platelets 69 (L) 140 - 450 10*3/mm3    Neutrophil % 82.6 (H) 42.7 - 76.0 %    Lymphocyte % 13.3 (L) 19.6 - 45.3 %    Monocyte % 2.9 (L) 5.0 - 12.0 %    Eosinophil % 0.3 0.3 - 6.2 %    Basophil % 0.3 0.0 - 1.5 %    Neutrophils, Absolute 7.40 (H) 1.70 - 7.00 10*3/mm3    Lymphocytes, Absolute 1.19 0.70 - 3.10 10*3/mm3    Monocytes, Absolute 0.26 0.10 - 0.90 10*3/mm3    Eosinophils, Absolute 0.03 0.00 - 0.40 10*3/mm3    Basophils, Absolute 0.03 0.00 - 0.20 10*3/mm3   Duplex Venous Lower Extremity - Right    Collection Time: 12/20/22  1:20 PM   Result Value Ref Range    Target HR (85%) 110 bpm    Max. Pred. HR (100%) 129 bpm    Right Common Femoral Spont Y     Right Common Femoral Competent Y     Right Common Femoral Phasic Y     Right Common Femoral Compress C     Right Common Femoral Augment Y     Right Saphenofemoral Junction Compress C     Right Profunda Femoral Compress C     Right Proximal Femoral Compress C     Right Mid Femoral Spont Y     Right Mid Femoral Competent Y     Right Mid Femoral Phasic Y     Right Mid Femoral Compress C     Right Mid Femoral Augment Y     Right Distal Femoral Compress C     Right Popliteal Spont Y     Right Popliteal Competent Y     Right Popliteal Phasic Y     Right Popliteal Compress C     Right Popliteal Augment Y     Right Posterior Tibial Compress C     Right Peroneal Compress C     Right Gastronemius Compress C     BH CV LOWER VASCULAR RIGHT SOLEAL COMPRESS C     Right Greater Saph AK Compress C     Right Greater Saph BK Compress C     Right Lesser Saph Compress C     Left Common Femoral Spont Y     Left Common Femoral Competent Y     Left Common Femoral Phasic Y     Left Common Femoral Compress C      Left Common Femoral Augment Y    ECG 12 Lead Dyspnea    Collection Time: 12/20/22  1:38 PM   Result Value Ref Range    QT Interval 439 ms   Comprehensive Metabolic Panel    Collection Time: 12/20/22  1:50 PM    Specimen: Blood   Result Value Ref Range    Glucose 325 (H) 65 - 99 mg/dL    BUN 30 (H) 8 - 23 mg/dL    Creatinine 1.59 (H) 0.57 - 1.00 mg/dL    Sodium 136 136 - 145 mmol/L    Potassium 4.5 3.5 - 5.2 mmol/L    Chloride 99 98 - 107 mmol/L    CO2 26.8 22.0 - 29.0 mmol/L    Calcium 8.7 8.2 - 9.6 mg/dL    Total Protein 5.5 (L) 6.0 - 8.5 g/dL    Albumin 3.30 (L) 3.50 - 5.20 g/dL    ALT (SGPT) 32 1 - 33 U/L    AST (SGOT) 21 1 - 32 U/L    Alkaline Phosphatase 72 39 - 117 U/L    Total Bilirubin 0.7 0.0 - 1.2 mg/dL    Globulin 2.2 gm/dL    A/G Ratio 1.5 g/dL    BUN/Creatinine Ratio 18.9 7.0 - 25.0    Anion Gap 10.2 5.0 - 15.0 mmol/L    eGFR 30.6 (L) >60.0 mL/min/1.73   Troponin    Collection Time: 12/20/22  1:50 PM    Specimen: Blood   Result Value Ref Range    Troponin T 0.051 (C) 0.000 - 0.030 ng/mL   Procalcitonin    Collection Time: 12/20/22  1:50 PM    Specimen: Blood   Result Value Ref Range    Procalcitonin 0.13 0.00 - 0.25 ng/mL   Urinalysis With Microscopic If Indicated (No Culture) - Straight Cath    Collection Time: 12/20/22  2:02 PM    Specimen: Straight Cath; Urine   Result Value Ref Range    Color, UA Yellow Yellow, Straw    Appearance, UA Clear Clear    pH, UA 6.5 5.0 - 8.0    Specific Gravity, UA 1.010 1.005 - 1.030    Glucose,  mg/dL (1+) (A) Negative    Ketones, UA Negative Negative    Bilirubin, UA Negative Negative    Blood, UA Negative Negative    Protein, UA Trace (A) Negative    Leuk Esterase, UA Negative Negative    Nitrite, UA Negative Negative    Urobilinogen, UA 1.0 E.U./dL 0.2 - 1.0 E.U./dL       Ordered the above labs and independently reviewed the results.      RADIOLOGY  XR Foot 3+ View Right    Result Date: 12/20/2022  XR FOOT 3+ VW RIGHT-  12/20/2022  HISTORY: Fell on December 7.  Foot pain and swelling.  There is a mildly displaced fracture involving the lateral aspect of the base of the proximal phalanx of the right great toe. Bones are demineralized. There are some minimal hallux valgus deformity of the right first metatarsophalangeal joint.  No other fractures or dislocations are seen. Calcaneal spurs are seen.      1. Fracture of the proximal phalanx of the right great toe.   This report was finalized on 12/20/2022 12:32 PM by Dr. Herbie Pike M.D.      XR Chest 1 View    Result Date: 12/20/2022  XR CHEST 1 VW-  12/20/2022  HISTORY: Shortness of breath.  Heart size is mildly enlarged. There are some mild patchy increased density in the right lung base and minimally in the left lung base. This could represent some mild bibasilar atelectasis, atypical pulmonary edema and/or developing pneumonia. Please correlate with the clinical findings. Sternotomy wires are seen. No pneumothorax is seen.  This report was finalized on 12/20/2022 12:28 PM by Dr. Herbie Pike M.D.      Duplex Venous Lower Extremity - Right    Result Date: 12/20/2022  •  Normal right lower extremity venous duplex scan.       I ordered the above noted radiological studies and viewed the images on the PACS system.       EKG      Independently viewed by me and interpreted by Dr Faust         MEDICAL RECORD REVIEW  Medical records reviewed in [facility], patient was seen 12/7/2022 after the fall.  She had imaging of her Right knee, right hip, lumbar spine.  Patient had bilateral lower extremity venous Doppler 10/17/2022 which showed subacute right lower extremity DVT noted in the soleal along with a chronic left lower extremity DVT noted in soleal.  Patient had transthoracic echo 1/26/2022, EF 68%, normal left ventricular systolic function, (nuclear diastolic function consistent with age.  Bioprosthetic aortic valve present.      PROCEDURES    Procedures        DIFFERENTIAL DIAGNOSIS  Differential Diagnosis for Dyspnea  include but are not limited to the following:  -Asthma  - COPD exacerbation  -Pneumonia  -PE  - Acute Respiratory Distress  - Pneumothorax  - CHF  - MI  - Anemia  - Hyperventilation  - Pleural Effusion  - Sepsis          PROGRESS, DATA ANALYSIS, CONSULTS, AND MEDICAL DECISION MAKING        ED Course as of 12/20/22 1512   Tue Dec 20, 2022   1153 Patient is a 89-year-old who presents today with multiple complaints including shortness of breath with exertion, right foot pain status post a fall December 7, right lower extremity swelling.  Labs including BNP and troponin have been ordered, chest x-ray to evaluate for CHF or other abnormalities and right foot x-ray to rule out fracture.  We will also do a right lower extremity venous Doppler to rule out DVT. [MS]   1200 Reviewed pt's history and workup with Dr. Faust.  After a bedside evaluation, he agrees with the plan of care.  Care transferred to him at this time.     [MS]   1257 Platelets(!): 69 [MS]   1339 proBNP(!): 4,161.0 [MS]   1406 Discussed with vascular tech, patient's right lower extremity venous Doppler preliminary report is negative for DVT. [MS]   1419 Patient reassessed.  I explained that given her chest x-ray which shows edema versus atypical pneumonia and her dyspnea on exertion, I recommended admission for further management. [JR]   1420 Medical record review: I reviewed echo from 7/31/2022 that showed EF 61%, grade 2 diastolic dysfunction, bioprosthetic aortic valve in place.  Mild MVR. [JR]   1423 EKG          EKG time: 1338  Rhythm/Rate: Ectopic atrial rhythm, rate 81  P waves and NC: Normal  QRS, axis: Normal axis  ST and T waves: No acute ischemic changes    Interpreted Contemporaneously by me, independently viewed  Similar compared to prior 8/7/2022 however PACs new today       [JR]   1433 Creatinine(!): 1.59 [JR]   1433 Potassium: 4.5 [JR]   1512 Discussed with Dr. Mancuso, hospitalist, who agrees to admit on behalf of Dr. Arnold. [JR]      ED  Course User Index  [JR] Mayank Faust MD  [MS] Shikha Pozo, APRN           MEDICATIONS GIVEN IN ED    Medications   sodium chloride 0.9 % flush 10 mL (has no administration in time range)   furosemide (LASIX) injection 80 mg (80 mg Intravenous Given 12/20/22 9214)           COURSE & MEDICAL DECISION MAKING  Any/All labs and Any/All Imaging studies that were ordered were reviewed and are noted above.  Results were reviewed/discussed with the patient and they were also made aware of online access.    Pt also made aware that some labs, such as cultures, will not be resulted during ER visit and followup with PMD is necessary.        Shikha Pozo, APRN  12/20/22 [id number]

## 2022-12-20 NOTE — ED NOTES
Pt c/o right sided leg, hip, ankle, and knee pain, as well as lower back pain. Pt reports the pain started about 2 weeks ago when she fell. Pt denies hitting head, LOC, or blood thinners. Pt also c/o intermittent shortness of breath and nausea.     This RN wore mask, gloves, eyewear and all other appropriate PPE while performing patient care.

## 2022-12-21 ENCOUNTER — APPOINTMENT (OUTPATIENT)
Dept: GENERAL RADIOLOGY | Facility: HOSPITAL | Age: 87
DRG: 291 | End: 2022-12-21
Payer: MEDICARE

## 2022-12-21 LAB
ANION GAP SERPL CALCULATED.3IONS-SCNC: 10.6 MMOL/L (ref 5–15)
BUN SERPL-MCNC: 35 MG/DL (ref 8–23)
BUN/CREAT SERPL: 18.2 (ref 7–25)
CALCIUM SPEC-SCNC: 8.9 MG/DL (ref 8.2–9.6)
CHLORIDE SERPL-SCNC: 100 MMOL/L (ref 98–107)
CO2 SERPL-SCNC: 30.4 MMOL/L (ref 22–29)
CREAT SERPL-MCNC: 1.92 MG/DL (ref 0.57–1)
DEPRECATED RDW RBC AUTO: 47.7 FL (ref 37–54)
EGFRCR SERPLBLD CKD-EPI 2021: 24.4 ML/MIN/1.73
ERYTHROCYTE [DISTWIDTH] IN BLOOD BY AUTOMATED COUNT: 15.1 % (ref 12.3–15.4)
GLUCOSE BLDC GLUCOMTR-MCNC: 202 MG/DL (ref 70–130)
GLUCOSE BLDC GLUCOMTR-MCNC: 263 MG/DL (ref 70–130)
GLUCOSE BLDC GLUCOMTR-MCNC: 275 MG/DL (ref 70–130)
GLUCOSE SERPL-MCNC: 182 MG/DL (ref 65–99)
HBA1C MFR BLD: 6.3 % (ref 4.8–5.6)
HCT VFR BLD AUTO: 38.1 % (ref 34–46.6)
HGB BLD-MCNC: 12.4 G/DL (ref 12–15.9)
MCH RBC QN AUTO: 28 PG (ref 26.6–33)
MCHC RBC AUTO-ENTMCNC: 32.5 G/DL (ref 31.5–35.7)
MCV RBC AUTO: 86 FL (ref 79–97)
PLATELET # BLD AUTO: 66 10*3/MM3 (ref 140–450)
PMV BLD AUTO: 10.8 FL (ref 6–12)
POTASSIUM SERPL-SCNC: 4.4 MMOL/L (ref 3.5–5.2)
RBC # BLD AUTO: 4.43 10*6/MM3 (ref 3.77–5.28)
SODIUM SERPL-SCNC: 141 MMOL/L (ref 136–145)
TSH SERPL DL<=0.05 MIU/L-ACNC: 2.39 UIU/ML (ref 0.27–4.2)
WBC NRBC COR # BLD: 6.92 10*3/MM3 (ref 3.4–10.8)

## 2022-12-21 PROCEDURE — 71046 X-RAY EXAM CHEST 2 VIEWS: CPT

## 2022-12-21 PROCEDURE — 83036 HEMOGLOBIN GLYCOSYLATED A1C: CPT | Performed by: INTERNAL MEDICINE

## 2022-12-21 PROCEDURE — 84443 ASSAY THYROID STIM HORMONE: CPT | Performed by: INTERNAL MEDICINE

## 2022-12-21 PROCEDURE — 25010000002 FUROSEMIDE PER 20 MG: Performed by: INTERNAL MEDICINE

## 2022-12-21 PROCEDURE — 99222 1ST HOSP IP/OBS MODERATE 55: CPT | Performed by: NURSE PRACTITIONER

## 2022-12-21 PROCEDURE — 82962 GLUCOSE BLOOD TEST: CPT

## 2022-12-21 PROCEDURE — 63710000001 PREDNISONE PER 5 MG: Performed by: INTERNAL MEDICINE

## 2022-12-21 PROCEDURE — 85027 COMPLETE CBC AUTOMATED: CPT | Performed by: INTERNAL MEDICINE

## 2022-12-21 PROCEDURE — 80048 BASIC METABOLIC PNL TOTAL CA: CPT | Performed by: INTERNAL MEDICINE

## 2022-12-21 PROCEDURE — 63710000001 INSULIN LISPRO (HUMAN) PER 5 UNITS: Performed by: INTERNAL MEDICINE

## 2022-12-21 RX ADMIN — LORAZEPAM 0.5 MG: 0.5 TABLET ORAL at 11:20

## 2022-12-21 RX ADMIN — AMLODIPINE BESYLATE 10 MG: 10 TABLET ORAL at 11:11

## 2022-12-21 RX ADMIN — INSULIN LISPRO 2 UNITS: 100 INJECTION, SOLUTION INTRAVENOUS; SUBCUTANEOUS at 11:10

## 2022-12-21 RX ADMIN — CARVEDILOL 12.5 MG: 12.5 TABLET, FILM COATED ORAL at 20:53

## 2022-12-21 RX ADMIN — DOCUSATE SODIUM 50MG AND SENNOSIDES 8.6MG 1 TABLET: 8.6; 5 TABLET, FILM COATED ORAL at 11:11

## 2022-12-21 RX ADMIN — TAMSULOSIN HYDROCHLORIDE 0.4 MG: 0.4 CAPSULE ORAL at 11:11

## 2022-12-21 RX ADMIN — INSULIN GLARGINE-YFGN 20 UNITS: 100 INJECTION, SOLUTION SUBCUTANEOUS at 20:53

## 2022-12-21 RX ADMIN — LORAZEPAM 0.5 MG: 0.5 TABLET ORAL at 21:07

## 2022-12-21 RX ADMIN — POLYETHYLENE GLYCOL 3350 17 G: 17 POWDER, FOR SOLUTION ORAL at 11:12

## 2022-12-21 RX ADMIN — LIDOCAINE 1 PATCH: 50 PATCH TOPICAL at 22:07

## 2022-12-21 RX ADMIN — FAMOTIDINE 10 MG: 20 TABLET, FILM COATED ORAL at 11:11

## 2022-12-21 RX ADMIN — FAMOTIDINE 10 MG: 20 TABLET, FILM COATED ORAL at 20:52

## 2022-12-21 RX ADMIN — GABAPENTIN 600 MG: 300 CAPSULE ORAL at 20:52

## 2022-12-21 RX ADMIN — FUROSEMIDE 40 MG: 10 INJECTION, SOLUTION INTRAMUSCULAR; INTRAVENOUS at 20:53

## 2022-12-21 RX ADMIN — SODIUM BICARBONATE 650 MG: 650 TABLET ORAL at 11:11

## 2022-12-21 RX ADMIN — HYDRALAZINE HYDROCHLORIDE 50 MG: 50 TABLET, FILM COATED ORAL at 11:11

## 2022-12-21 RX ADMIN — PREDNISONE 10 MG: 10 TABLET ORAL at 11:21

## 2022-12-21 RX ADMIN — Medication 1000 UNITS: at 11:11

## 2022-12-21 RX ADMIN — INSULIN LISPRO 4 UNITS: 100 INJECTION, SOLUTION INTRAVENOUS; SUBCUTANEOUS at 17:17

## 2022-12-21 RX ADMIN — LEVOTHYROXINE SODIUM 50 MCG: 0.05 TABLET ORAL at 11:11

## 2022-12-21 RX ADMIN — LORAZEPAM 0.5 MG: 0.5 TABLET ORAL at 03:02

## 2022-12-21 RX ADMIN — HYDROCODONE BITARTRATE AND ACETAMINOPHEN 1 TABLET: 7.5; 325 TABLET ORAL at 11:11

## 2022-12-21 RX ADMIN — INSULIN LISPRO 6 UNITS: 100 INJECTION, SOLUTION INTRAVENOUS; SUBCUTANEOUS at 13:35

## 2022-12-21 RX ADMIN — Medication 3 MG: at 20:53

## 2022-12-21 RX ADMIN — CARVEDILOL 12.5 MG: 12.5 TABLET, FILM COATED ORAL at 11:11

## 2022-12-21 RX ADMIN — GABAPENTIN 600 MG: 300 CAPSULE ORAL at 11:10

## 2022-12-21 RX ADMIN — HYDROCODONE BITARTRATE AND ACETAMINOPHEN 1 TABLET: 7.5; 325 TABLET ORAL at 03:02

## 2022-12-21 RX ADMIN — HYDROCODONE BITARTRATE AND ACETAMINOPHEN 1 TABLET: 7.5; 325 TABLET ORAL at 17:17

## 2022-12-21 RX ADMIN — HYDRALAZINE HYDROCHLORIDE 50 MG: 50 TABLET, FILM COATED ORAL at 20:53

## 2022-12-21 RX ADMIN — MONTELUKAST SODIUM 10 MG: 10 TABLET, FILM COATED ORAL at 20:53

## 2022-12-21 RX ADMIN — FUROSEMIDE 40 MG: 10 INJECTION, SOLUTION INTRAMUSCULAR; INTRAVENOUS at 11:11

## 2022-12-21 RX ADMIN — HYDROCODONE BITARTRATE AND ACETAMINOPHEN 1 TABLET: 7.5; 325 TABLET ORAL at 22:06

## 2022-12-21 NOTE — PLAN OF CARE
Problem: Adult Inpatient Plan of Care  Goal: Plan of Care Review  12/20/2022 2358 by Chanda Darling RN  Outcome: Ongoing, Progressing  12/20/2022 2357 by Chanda Darling RN  Outcome: Ongoing, Progressing  Goal: Patient-Specific Goal (Individualized)  12/20/2022 2358 by Chanda Darling RN  Outcome: Ongoing, Progressing  12/20/2022 2357 by Chanda Darling RN  Outcome: Ongoing, Progressing  Goal: Absence of Hospital-Acquired Illness or Injury  12/20/2022 2358 by Chanda Darling RN  Outcome: Ongoing, Progressing  12/20/2022 2357 by Chanda Darling RN  Outcome: Ongoing, Progressing  Intervention: Identify and Manage Fall Risk  Recent Flowsheet Documentation  Taken 12/20/2022 2000 by Chanda Darling RN  Safety Promotion/Fall Prevention: safety round/check completed  Intervention: Prevent Skin Injury  Recent Flowsheet Documentation  Taken 12/20/2022 2000 by Chanda Darling RN  Skin Protection:   adhesive use limited   transparent dressing maintained  Intervention: Prevent and Manage VTE (Venous Thromboembolism) Risk  Recent Flowsheet Documentation  Taken 12/20/2022 2000 by Chanda Darling RN  VTE Prevention/Management: (Foot injury)   bilateral   sequential compression devices off   patient refused intervention  Intervention: Prevent Infection  Recent Flowsheet Documentation  Taken 12/20/2022 2000 by Chanda Darling RN  Infection Prevention: environmental surveillance performed  Goal: Optimal Comfort and Wellbeing  12/20/2022 2358 by Chanda Darling RN  Outcome: Ongoing, Progressing  12/20/2022 2357 by Chanda Darling RN  Outcome: Ongoing, Progressing  Intervention: Monitor Pain and Promote Comfort  Recent Flowsheet Documentation  Taken 12/20/2022 2000 by Chanda Darling RN  Pain Management Interventions:   see MAR   quiet environment facilitated   pillow support provided  Intervention: Provide Person-Centered Care  Recent Flowsheet Documentation  Taken 12/20/2022 2000 by Chanda Darling RN  Trust Relationship/Rapport:   care  explained   thoughts/feelings acknowledged  Goal: Readiness for Transition of Care  12/20/2022 2358 by Chanda Darling RN  Outcome: Ongoing, Progressing  12/20/2022 2357 by Chanda Darling RN  Outcome: Ongoing, Progressing  Goal: Plan of Care Review  Outcome: Ongoing, Progressing  Goal: Patient-Specific Goal (Individualized)  Outcome: Ongoing, Progressing  Goal: Absence of Hospital-Acquired Illness or Injury  Outcome: Ongoing, Progressing  Intervention: Identify and Manage Fall Risk  Recent Flowsheet Documentation  Taken 12/20/2022 2000 by Chanda Darling RN  Safety Promotion/Fall Prevention: safety round/check completed  Intervention: Prevent Skin Injury  Recent Flowsheet Documentation  Taken 12/20/2022 2000 by Chanda Darling RN  Skin Protection:   adhesive use limited   transparent dressing maintained  Intervention: Prevent and Manage VTE (Venous Thromboembolism) Risk  Recent Flowsheet Documentation  Taken 12/20/2022 2000 by Chanda Darling RN  VTE Prevention/Management: (Foot injury)   bilateral   sequential compression devices off   patient refused intervention  Intervention: Prevent Infection  Recent Flowsheet Documentation  Taken 12/20/2022 2000 by Chanda Darling RN  Infection Prevention: environmental surveillance performed  Goal: Optimal Comfort and Wellbeing  Outcome: Ongoing, Progressing  Intervention: Monitor Pain and Promote Comfort  Recent Flowsheet Documentation  Taken 12/20/2022 2000 by Chanda Darling RN  Pain Management Interventions:   see MAR   quiet environment facilitated   pillow support provided  Intervention: Provide Person-Centered Care  Recent Flowsheet Documentation  Taken 12/20/2022 2000 by Chanda Darling RN  Trust Relationship/Rapport:   care explained   thoughts/feelings acknowledged  Goal: Readiness for Transition of Care  Outcome: Ongoing, Progressing     Problem: Fall Injury Risk  Goal: Absence of Fall and Fall-Related Injury  12/20/2022 2358 by Chanda Darling RN  Outcome: Ongoing,  Progressing  12/20/2022 2357 by Chanda Darling RN  Outcome: Ongoing, Progressing  Intervention: Identify and Manage Contributors  Recent Flowsheet Documentation  Taken 12/20/2022 2000 by Chanda Darling RN  Medication Review/Management: medications reviewed  Intervention: Promote Injury-Free Environment  Recent Flowsheet Documentation  Taken 12/20/2022 2000 by Chanda Darling RN  Safety Promotion/Fall Prevention: safety round/check completed     Problem: Pain Acute  Goal: Acceptable Pain Control and Functional Ability  12/20/2022 2358 by Chanda Darling RN  Outcome: Ongoing, Progressing  12/20/2022 2357 by Chanda Darling RN  Outcome: Ongoing, Progressing  Intervention: Prevent or Manage Pain  Recent Flowsheet Documentation  Taken 12/20/2022 2000 by Chanda Darling RN  Sensory Stimulation Regulation:   lighting decreased   quiet environment promoted   auditory stimulation minimized   care clustered   visual stimulation minimized  Sleep/Rest Enhancement:   awakenings minimized   consistent schedule promoted   noise level reduced   regular sleep/rest pattern promoted   therapeutic touch utilized  Medication Review/Management: medications reviewed  Intervention: Develop Pain Management Plan  Recent Flowsheet Documentation  Taken 12/20/2022 2000 by Chanda Darling RN  Pain Management Interventions:   see MAR   quiet environment facilitated   pillow support provided  Intervention: Optimize Psychosocial Wellbeing  Recent Flowsheet Documentation  Taken 12/20/2022 2000 by Chanda Darling RN  Supportive Measures: active listening utilized  Diversional Activities: television  Spiritual Activities Assistance: affirmation provided     Problem: Diabetes Comorbidity  Goal: Blood Glucose Level Within Targeted Range  12/20/2022 2358 by Chanda Darling RN  Outcome: Ongoing, Progressing  12/20/2022 2357 by Chanda Darling RN  Outcome: Ongoing, Progressing  Intervention: Monitor and Manage Glycemia  Recent Flowsheet Documentation  Taken  12/20/2022 2000 by Chanda Darling RN  Glycemic Management: blood glucose monitored     Problem: Heart Failure Comorbidity  Goal: Maintenance of Heart Failure Symptom Control  12/20/2022 2358 by Chanda Darling RN  Outcome: Ongoing, Progressing  12/20/2022 2357 by Chanda Darling RN  Outcome: Ongoing, Progressing  Intervention: Maintain Heart Failure-Management  Recent Flowsheet Documentation  Taken 12/20/2022 2000 by Chanda Darling RN  Medication Review/Management: medications reviewed     Problem: Hypertension Comorbidity  Goal: Blood Pressure in Desired Range  12/20/2022 2358 by Chanda Darling RN  Outcome: Ongoing, Progressing  12/20/2022 2357 by Chanda Darling RN  Outcome: Ongoing, Progressing  Intervention: Maintain Blood Pressure Management  Recent Flowsheet Documentation  Taken 12/20/2022 2000 by Chanda Darling RN  Syncope Management: position changed slowly  Medication Review/Management: medications reviewed     Problem: Pain Chronic (Persistent) (Comorbidity Management)  Goal: Acceptable Pain Control and Functional Ability  12/20/2022 2358 by Chanda Darling RN  Outcome: Ongoing, Progressing  12/20/2022 2357 by Chanda Darling RN  Outcome: Ongoing, Progressing  Intervention: Manage Persistent Pain  Recent Flowsheet Documentation  Taken 12/20/2022 2000 by Chanda Darling RN  Sleep/Rest Enhancement:   awakenings minimized   consistent schedule promoted   noise level reduced   regular sleep/rest pattern promoted   therapeutic touch utilized  Medication Review/Management: medications reviewed  Intervention: Develop Pain Management Plan  Recent Flowsheet Documentation  Taken 12/20/2022 2000 by Chanda Darling RN  Pain Management Interventions:   see MAR   quiet environment facilitated   pillow support provided  Intervention: Optimize Psychosocial Wellbeing  Recent Flowsheet Documentation  Taken 12/20/2022 2000 by Chanda Darling RN  Supportive Measures: active listening utilized  Diversional Activities:  television  Spiritual Activities Assistance: affirmation provided  Family/Support System Care:   support provided   self-care encouraged     Problem: Skin Injury Risk Increased  Goal: Skin Health and Integrity  12/20/2022 2358 by Chanda Darling RN  Outcome: Ongoing, Progressing  12/20/2022 2357 by Chanda Darling RN  Outcome: Ongoing, Progressing  Intervention: Optimize Skin Protection  Recent Flowsheet Documentation  Taken 12/20/2022 2000 by Chanda Darling RN  Pressure Reduction Techniques:   weight shift assistance provided   frequent weight shift encouraged  Skin Protection:   adhesive use limited   transparent dressing maintained  Goal: Skin Health and Integrity  Outcome: Ongoing, Progressing  Intervention: Optimize Skin Protection  Recent Flowsheet Documentation  Taken 12/20/2022 2000 by Chanda Darling RN  Pressure Reduction Techniques:   weight shift assistance provided   frequent weight shift encouraged  Skin Protection:   adhesive use limited   transparent dressing maintained     Problem: Adjustment to Illness (Heart Failure)  Goal: Optimal Coping  Outcome: Ongoing, Progressing  Intervention: Support Psychosocial Response  Recent Flowsheet Documentation  Taken 12/20/2022 2000 by Chanda Darling RN  Supportive Measures: active listening utilized  Family/Support System Care:   support provided   self-care encouraged     Problem: Cardiac Output Decreased (Heart Failure)  Goal: Optimal Cardiac Output  Outcome: Ongoing, Progressing     Problem: Dysrhythmia (Heart Failure)  Goal: Stable Heart Rate and Rhythm  Outcome: Ongoing, Progressing     Problem: Fluid Imbalance (Heart Failure)  Goal: Fluid Balance  Outcome: Ongoing, Progressing     Problem: Functional Ability Impaired (Heart Failure)  Goal: Optimal Functional Ability  Outcome: Ongoing, Progressing     Problem: Oral Intake Inadequate (Heart Failure)  Goal: Optimal Nutrition Intake  Outcome: Ongoing, Progressing     Problem: Respiratory Compromise (Heart  Failure)  Goal: Effective Oxygenation and Ventilation  Outcome: Ongoing, Progressing  Intervention: Promote Airway Secretion Clearance  Recent Flowsheet Documentation  Taken 12/20/2022 2000 by Chanda Darling RN  Cough And Deep Breathing: done independently per patient     Problem: Sleep Disordered Breathing (Heart Failure)  Goal: Effective Breathing Pattern During Sleep  Outcome: Ongoing, Progressing   Goal Outcome Evaluation:

## 2022-12-21 NOTE — NURSING NOTE
CWOCN- consult for buttocks/coccyx- assessed patient's skin. She has a dressing (silicone border) already on the left buttock. I lifted this up and there is a very small partial thickness open area that looks like the skin may have rolled or blistered prior and is now healing. The dressing is appropriate and will reduce friction and manage any moisture. The buttocks are pink, blanching. Recommend barrier ointment PRN for irritation. Her right foot, ankle are bruised from a fall and she does have a confirmed fracture of the right great toe. She has had an abdominal surgery and there is one area that stays raised, discolored, currently scabbed.  Patient also has other discolorations to the skin such as on the left leg.   The only treatment needed is to continue the dressing on the left buttock and apply barrier ointment to the perineum and buttocks PRN. Patient agrees with plan. She stated that she is uncomfortable turning to the right but turning left is ok.

## 2022-12-21 NOTE — H&P
HISTORY AND PHYSICAL   [facility]        Date of Admission: 2022  Patient Identification:  Name: Helena Carmen  Age: 89 y.o.  Sex: female  :  1931  MRN: [id number]                     Primary Care Physician: Mariah Hickman MD    Chief Complaint:  89 year old female who presented to the emergency room with shortness of breath with little exertion; she has also had pain of her back and right hip; she was hospitalized two months after a fall; she denies fever or chills; no cough or sick contacts    History of Present Illness:   As above    Past Medical History:  Past Medical History:   Diagnosis Date   • Aortic valve stenosis     s/p tissue AVR   • Back pain    • CKD (chronic kidney disease)    • Colitis due to Clostridioides difficile 2022   • Diastolic dysfunction     Grade 2 per echocardiogram    • Diverticulosis    • Exertional shortness of breath    • Heart disease    • Hiatal hernia    • Hyperlipidemia    • Hypertension    • Hyperthyroidism    • Hypertriglyceridemia 2018   • Hypothyroidism    • Left ventricular hypertrophy    • Legally blind    • Liver disease    • Macular degeneration    • Mitral regurgitation    • Osteoarthritis of hip    • Pancreatitis 2022   • Paroxysmal atrial fibrillation (HCC)    • Premature ventricular contractions    • Pulmonary hypertension (HCC)    • Renal insufficiency syndrome    • Type 2 diabetes mellitus (HCC)    • Uterine cancer (HCC)      Past Surgical History:  Past Surgical History:   Procedure Laterality Date   • AORTIC VALVE REPAIR/REPLACEMENT     • CATARACT EXTRACTION      ,    • CHOLECYSTECTOMY     • ENDOSCOPY  08/15/2014    no gross lesions in stomach/duodenum, erythrematous mucosa in stomach   • HYSTERECTOMY     • STERNOTOMY        Home Meds:  Medications Prior to Admission   Medication Sig Dispense Refill Last Dose   • amLODIPine (NORVASC) 10 MG tablet Take 1 tablet by mouth daily.   2022   •  carvedilol (COREG) 12.5 MG tablet Take 1 tablet by mouth 2 (Two) Times a Day. 60 tablet 11 12/20/2022   • cetirizine (zyrTEC) 5 MG tablet Take 1 tablet by mouth Daily As Needed for Allergies.   12/20/2022   • cholecalciferol (VITAMIN D3) 25 MCG (1000 UT) tablet Take 1 tablet by mouth Daily.   12/20/2022   • famotidine (PEPCID) 10 MG tablet Take 10 mg by mouth 2 (Two) Times a Day.   12/20/2022   • furosemide (LASIX) 20 MG tablet Take 1 tablet by mouth Daily. 30 tablet 11 12/20/2022   • gabapentin (NEURONTIN) 400 MG capsule Take 600 mg by mouth 2 (Two) Times a Day.   12/20/2022   • hydrALAZINE (APRESOLINE) 25 MG tablet Take 3 tablets by mouth Every 8 (Eight) Hours. (Patient taking differently: Take 50 mg by mouth 2 (Two) Times a Day.)   12/20/2022   • HYDROcodone-acetaminophen (NORCO) 7.5-325 MG per tablet Take 1 tablet by mouth Every 6 (Six) Hours.   12/20/2022   • insulin glargine (LANTUS, SEMGLEE) 100 UNIT/ML injection Inject 34 Units under the skin into the appropriate area as directed Every Night. (Patient taking differently: Inject 20 Units under the skin into the appropriate area as directed Every Night.)  12 12/19/2022   • insulin lispro (humaLOG) 100 UNIT/ML injection Inject 0-10 Units under the skin into the appropriate area as directed 3 (Three) Times a Day Before Meals. 2 units for every 50 > 150   12/20/2022   • levothyroxine (SYNTHROID, LEVOTHROID) 25 MCG tablet Take 50 mcg by mouth Daily.   12/20/2022   • lidocaine (LIDODERM) 5 % Place 1 patch on the skin as directed by provider Daily. Remove & Discard patch within 12 hours or as directed by MD 6 each 0 Past Week   • Loratadine (CLARITIN PO) Take  by mouth.   12/20/2022   • LORazepam (ATIVAN) 0.5 MG tablet Take 1 tablet by mouth Every 8 (Eight) Hours As Needed for Anxiety. 10 tablet 0 12/19/2022   • methocarbamol (ROBAXIN) 750 MG tablet Take 1 tablet by mouth Every 6 (Six) Hours As Needed for Muscle Spasms. (Patient taking differently: Take 750 mg by  mouth Every 8 (Eight) Hours As Needed for Muscle Spasms.)   12/20/2022   • montelukast (SINGULAIR) 10 MG tablet Take 1 tablet by mouth Every Night.   12/20/2022   • polyethylene glycol (MIRALAX) 17 GM/SCOOP powder Take 17 g by mouth Daily.   12/20/2022   • predniSONE (DELTASONE) 5 MG tablet Take 3 tablets by mouth Daily With Breakfast. (Patient taking differently: Take 10 mg by mouth Daily With Breakfast.) 270 tablet 0 12/20/2022   • sennosides-docusate (PERICOLACE) 8.6-50 MG per tablet Take 1 tablet by mouth Daily.   12/20/2022   • sodium bicarbonate 650 MG tablet Take 1 tablet by mouth 2 (Two) Times a Day. 60 tablet 0 12/20/2022   • tamsulosin (FLOMAX) 0.4 MG capsule 24 hr capsule Take 0.4 mg by mouth every night at bedtime.   12/20/2022   • melatonin 1 MG tablet Take  by mouth.          Allergies:  Allergies   Allergen Reactions   • Erythromycin Unknown (See Comments) and Other (See Comments)     Pt states she does not remember but it was many years ago  Pt states she does not remember but it was many years ago   • Statins Myalgia   • Cephalexin Other (See Comments) and Unknown (See Comments)     June 2022 Pt has tolerated ceftriaxone and cefepime during admission.   Pt states she does not remember reaction but it was many years ago   • Penicillins Rash   • Sulfa Antibiotics Itching and Rash     Immunizations:  Immunization History   Administered Date(s) Administered   • COVID-19 (PFIZER) PURPLE CAP 07/15/2021, 08/05/2021   • Covid-19 (Pfizer) Gray Cap 08/29/2022   • Fluad Quad 65+ 10/16/2020, 10/15/2021, 10/04/2022   • Fluzone High Dose =>65 Years ([facility] ONLY) 10/27/2014, 10/09/2015, 10/07/2016, 09/25/2017, 09/26/2018, 10/18/2019   • Pneumococcal Conjugate 13-Valent (PCV13) 08/19/2016   • Pneumococcal Polysaccharide (PPSV23) 02/07/2020     Social History:   Social History     Social History Narrative   • Not on file     Social History     Socioeconomic History   • Marital status:    Tobacco Use   •  Smoking status: Former     Packs/day: 0.50     Types: Cigarettes     Quit date: 7/10/1969     Years since quittin.4   • Smokeless tobacco: Never   Vaping Use   • Vaping Use: Never used   Substance and Sexual Activity   • Alcohol use: No     Comment: caffeine use - coffee 2 cups daily   • Drug use: No   • Sexual activity: Defer       Family History:  Family History   Problem Relation Age of Onset   • Heart disease Mother    • Hypertension Mother    • Stroke Mother    • Diabetes Mother         mellitus   • Other Other         cardiovascular disorder        Review of Systems  See history of present illness and past medical history.  Patient denies headache, dizziness, syncope, falls, trauma, change in vision, change in hearing, change in taste, changes in weight, changes in appetite, focal weakness, numbness, or paresthesia.  Patient denies chest pain, palpitations sinus pressure, rhinorrhea, epistaxis, hemoptysis, nausea, vomiting,hematemesis, diarrhea, constipation or hematchezia.  Denies cold or heat intolerance, polydipsia, polyuria, polyphagia. Denies hematuria, pyuria, dysuria, hesitancy, frequency or urgency. Denies consumption of raw and under cooked meats foods or change in water source.  Denies fever, chills, sweats, night sweats.  Denies missing any routine medications. Remainder of ROS is negative.    Objective:  T Max 24 hrs: Temp (24hrs), Av.4 °F (36.9 °C), Min:97.7 °F (36.5 °C), Max:99.8 °F (37.7 °C)    Vitals Ranges:   Temp:  [97.7 °F (36.5 °C)-99.8 °F (37.7 °C)] 97.7 °F (36.5 °C)  Heart Rate:  [64-82] 74  Resp:  [16-18] 18  BP: (149-184)/(57-80) 184/57      Exam:  BP (!) 184/57 (BP Location: Left arm, Patient Position: Lying) Comment: nurse notified  Pulse 74   Temp 97.7 °F (36.5 °C) (Oral)   Resp 18   Ht 144.8 cm (57\")   Wt 70.7 kg (155 lb 14.4 oz)   SpO2 92%   BMI 33.74 kg/m²     General Appearance:    Alert, cooperative, no distress, appears stated age   Head:    Normocephalic,  without obvious abnormality, atraumatic   Eyes:    PERRL, conjunctivae/corneas clear, EOM's intact, both eyes   Ears:    Normal external ear canals, both ears   Nose:   Nares normal, septum midline, mucosa normal, no drainage    or sinus tenderness   Throat:   Lips, mucosa, and tongue normal   Neck:   Supple, symmetrical, trachea midline, no adenopathy;     thyroid:  no enlargement/tenderness/nodules; no carotid    bruit or JVD   Back:     Symmetric, no curvature, ROM normal, no CVA tenderness   Lungs:     Decreased breath sounds bilaterally, respirations unlabored   Chest Wall:    No tenderness or deformity    Heart:    Regular rate and rhythm, S1 and S2 normal, no murmur, rub   or gallop   Abdomen:     Soft, nontender, bowel sounds active all four quadrants,     no masses, no hepatomegaly, no splenomegaly   Extremities:   Extremities normal, atraumatic, no cyanosis or edema   Pulses:   2+ and symmetric all extremities   Skin:   Skin color, texture, turgor normal, no rashes or lesions    .    Data Review:  Labs in chart were reviewed.  WBC   Date Value Ref Range Status   12/20/2022 8.96 3.40 - 10.80 10*3/mm3 Final     Hemoglobin   Date Value Ref Range Status   12/20/2022 12.9 12.0 - 15.9 g/dL Final     Hematocrit   Date Value Ref Range Status   12/20/2022 39.3 34.0 - 46.6 % Final     Platelets   Date Value Ref Range Status   12/20/2022 69 (L) 140 - 450 10*3/mm3 Final     Sodium   Date Value Ref Range Status   12/20/2022 136 136 - 145 mmol/L Final     Potassium   Date Value Ref Range Status   12/20/2022 4.5 3.5 - 5.2 mmol/L Final     Comment:     Slight hemolysis detected by analyzer. Results may be affected.     Chloride   Date Value Ref Range Status   12/20/2022 99 98 - 107 mmol/L Final     CO2   Date Value Ref Range Status   12/20/2022 26.8 22.0 - 29.0 mmol/L Final     BUN   Date Value Ref Range Status   12/20/2022 30 (H) 8 - 23 mg/dL Final     Creatinine   Date Value Ref Range Status   12/20/2022 1.59 (H) 0.57 -  1.00 mg/dL Final     Glucose   Date Value Ref Range Status   12/20/2022 325 (H) 65 - 99 mg/dL Final     Calcium   Date Value Ref Range Status   12/20/2022 8.7 8.2 - 9.6 mg/dL Final     Magnesium   Date Value Ref Range Status   12/20/2022 1.9 1.7 - 2.3 mg/dL Final     AST (SGOT)   Date Value Ref Range Status   12/20/2022 21 1 - 32 U/L Final     ALT (SGPT)   Date Value Ref Range Status   12/20/2022 32 1 - 33 U/L Final     Alkaline Phosphatase   Date Value Ref Range Status   12/20/2022 72 39 - 117 U/L Final                Imaging Results (All)     Procedure Component Value Units Date/Time    XR Foot 3+ View Right [[id number]] Collected: 12/20/22 1228     Updated: 12/20/22 1235    Narrative:      XR FOOT 3+ VW RIGHT-  12/20/2022     HISTORY: Fell on December 7. Foot pain and swelling.     There is a mildly displaced fracture involving the lateral aspect of the  base of the proximal phalanx of the right great toe. Bones are  demineralized. There are some minimal hallux valgus deformity of the  right first metatarsophalangeal joint.     No other fractures or dislocations are seen. Calcaneal spurs are seen.       Impression:      1. Fracture of the proximal phalanx of the right great toe.        This report was finalized on 12/20/2022 12:32 PM by Dr. Herbie Pike M.D.       XR Chest 1 View [[id number]] Collected: 12/20/22 1227     Updated: 12/20/22 1231    Narrative:      XR CHEST 1 VW-  12/20/2022     HISTORY: Shortness of breath.     Heart size is mildly enlarged. There are some mild patchy increased  density in the right lung base and minimally in the left lung base. This  could represent some mild bibasilar atelectasis, atypical pulmonary  edema and/or developing pneumonia. Please correlate with the clinical  findings. Sternotomy wires are seen. No pneumothorax is seen.     This report was finalized on 12/20/2022 12:28 PM by Dr. Herbie Pike M.D.               Assessment:  Active Hospital Problems     Diagnosis  POA   • **Acute diastolic congestive heart failure (HCC) [I50.31]  Yes      Resolved Hospital Problems   No resolved problems to display.   hypertension  Hypothyroidism  Obesity   Back pain  ckd3  Diabetes  Chronic itp    Plan:  Rebel  Repeat troponin  Ask cardiology to see her  Monitor on telemetry  accu checks, sliding scale insulin  Trend platelets  D. w patient and ED Provider as well as nurse    Isidra Mancuso MD  12/20/2022  20:44 EST

## 2022-12-21 NOTE — DISCHARGE PLACEMENT REQUEST
Helena Carmen (89 y.o. Female)     Date of Birth   02/20/1931    Social Security Number       Address   [address] [place] [address]    Home Phone   [phone]    MRN   [id number]       Christian   Congregation    Marital Status                               Admission Date   12/20/22    Admission Type   Emergency    Admitting Provider       Attending Provider   Mango Arnold MD    Department, Room/Bed   [address], [address]29/1       Discharge Date       Discharge Disposition       Discharge Destination                               Attending Provider: Mango Arnold MD    Allergies: Erythromycin, Statins, Cephalexin, Penicillins, Sulfa Antibiotics    Isolation: Enh Drop/Con   Infection: COVID (rule out) (12/20/22)   Code Status: No CPR    Ht: 144.8 cm (57\")   Wt: 74.7 kg (164 lb 11.2 oz)    Admission Cmt: None   Principal Problem: Acute diastolic congestive heart failure (HCC) [I50.31]                 Active Insurance as of 12/20/2022     Primary Coverage     Payor Plan Insurance Group Employer/Plan Group    MEDICARE MEDICARE A & B      Payor Plan Address Payor Plan Phone Number Payor Plan Fax Number Effective Dates    [address] [phone]  2/1/1996 - None Entered    [facility] [id number]       Subscriber Name Subscriber Birth Date Member ID       HELENA CARMEN 2/20/1931 [id number]           Secondary Coverage     Payor Plan Insurance Group Employer/Plan Group     FOR LIFE  FOR LIFE MC [id number]       Payor Plan Address Payor Plan Phone Number Payor Plan Fax Number Effective Dates    [address] [phone]  4/5/2016 - None Entered    [facility] [id number]       Subscriber Name Subscriber Birth Date Member ID       HELENA CARMEN 2/20/1931 [id number]                 Emergency Contacts      (Rel.) Home Phone Work Phone Mobile Phone    Brandon carmen (Son) [phone] -- --    Radha Hoyos (Daughter) [phone] -- [phone]    Margaret Loco  (Daughter) [phone] -- [phone]    ARAM SPAIN (Grandchild) -- -- [phone]

## 2022-12-21 NOTE — CONSULTS
[facility] Consult Note      Patient Name: Helena Carmen  : 1931  MRN: [id number]  Primary Care Physician:  Mariah Hickman MD  Referring Physician: Mango Arnold MD  Date of admission: 2022    Subjective     Reason for Consult:  NADER on CKD3b    HPI:   Helena Carmen is a 89 y.o. female with CKD3b (baseline SCR 1.5-1.7) followed by Dr. Art Wick of our group, admitted  for further evaluation of shortness of breath, back and right hip pain, and worsening discoloration right lower leg (she fell roughly 2 weeks ago, sustaining right great toe fracture; has significant bruising already of right foot).  CXR yesterday with mild bibasilar atelectasis with concerns for atypical pulmonary edema versus developing pneumonia; IV Lasix given yesterday, with 2.1 L UOP afterwards.  SCR 1.6 on arrival and 1.9 today.  Full PMH outlined below; notable is prior tissue AVR; pulmonary hypertension; chronic CHF; DM2; and hypertension.    · Weight has been roughly stable for the past year, per patient and family member at bedside  · Denies orthopnea, but does describe PND  · No chest pain, but has occasional ALEMAN  · Appetite is fair; no N/V  · No urinary complaints now but describes sense of straining while at home  · Bowel movements fine  · No fever or chills    Review of Systems:   14 point review of systems is otherwise negative except for mentioned above on HPI    Personal History     Past Medical History:   Diagnosis Date   • Aortic valve stenosis     s/p tissue AVR   • Back pain    • CKD (chronic kidney disease)    • Colitis due to Clostridioides difficile 2022   • Diastolic dysfunction     Grade 2 per echocardiogram    • Diverticulosis    • Exertional shortness of breath    • Heart disease    • Hiatal hernia    • Hyperlipidemia    • Hypertension    • Hyperthyroidism    • Hypertriglyceridemia 2018   • Hypothyroidism    • Left ventricular hypertrophy    • Legally  blind    • Liver disease    • Macular degeneration    • Mitral regurgitation    • Osteoarthritis of hip    • Pancreatitis 01/26/2022   • Paroxysmal atrial fibrillation (HCC)    • Premature ventricular contractions    • Pulmonary hypertension (HCC)    • Renal insufficiency syndrome    • Type 2 diabetes mellitus (HCC)    • Uterine cancer (HCC)        Past Surgical History:   Procedure Laterality Date   • AORTIC VALVE REPAIR/REPLACEMENT     • CATARACT EXTRACTION      1970, 1999   • CHOLECYSTECTOMY     • ENDOSCOPY  08/15/2014    no gross lesions in stomach/duodenum, erythrematous mucosa in stomach   • HYSTERECTOMY  2007   • STERNOTOMY         Family History: family history includes Diabetes in her mother; Heart disease in her mother; Hypertension in her mother; Other in an other family member; Stroke in her mother.    Social History:  reports that she quit smoking about 53 years ago. Her smoking use included cigarettes. She smoked an average of .5 packs per day. She has never used smokeless tobacco. She reports that she does not drink alcohol and does not use drugs.    Home Medications:  Prior to Admission medications    Medication Sig Start Date End Date Taking? Authorizing Provider   amLODIPine (NORVASC) 10 MG tablet Take 1 tablet by mouth daily. 9/19/13  Yes ProviderLuke MD   carvedilol (COREG) 12.5 MG tablet Take 1 tablet by mouth 2 (Two) Times a Day. 8/22/22  Yes Evi Fenton APRN   cetirizine (zyrTEC) 5 MG tablet Take 1 tablet by mouth Daily As Needed for Allergies. 8/29/22  Yes Eran Forbes MD   cholecalciferol (VITAMIN D3) 25 MCG (1000 UT) tablet Take 1 tablet by mouth Daily. 8/30/22  Yes Eran Forbes MD   famotidine (PEPCID) 10 MG tablet Take 10 mg by mouth 2 (Two) Times a Day.   Yes ProviderLuke MD   furosemide (LASIX) 20 MG tablet Take 1 tablet by mouth Daily. 6/30/22  Yes Sima Blum APRN   gabapentin (NEURONTIN) 400 MG capsule Take 600 mg by  mouth 2 (Two) Times a Day.   Yes Luke Grove MD   hydrALAZINE (APRESOLINE) 25 MG tablet Take 3 tablets by mouth Every 8 (Eight) Hours.  Patient taking differently: Take 50 mg by mouth 2 (Two) Times a Day. 8/29/22  Yes Eran Forbes MD   HYDROcodone-acetaminophen (NORCO) 7.5-325 MG per tablet Take 1 tablet by mouth Every 6 (Six) Hours. 11/3/22  Yes Luke Grove MD   insulin glargine (LANTUS, SEMGLEE) 100 UNIT/ML injection Inject 34 Units under the skin into the appropriate area as directed Every Night.  Patient taking differently: Inject 20 Units under the skin into the appropriate area as directed Every Night. 8/29/22  Yes Eran Forbes MD   insulin lispro (humaLOG) 100 UNIT/ML injection Inject 0-10 Units under the skin into the appropriate area as directed 3 (Three) Times a Day Before Meals. 2 units for every 50 > 150   Yes Luke Grove MD   levothyroxine (SYNTHROID, LEVOTHROID) 25 MCG tablet Take 50 mcg by mouth Daily. 3/8/13  Yes Luke Grove MD   lidocaine (LIDODERM) 5 % Place 1 patch on the skin as directed by provider Daily. Remove & Discard patch within 12 hours or as directed by MD 12/7/22  Yes Luly Gaines MD   Loratadine (CLARITIN PO) Take  by mouth.   Yes Luke Grove MD   LORazepam (ATIVAN) 0.5 MG tablet Take 1 tablet by mouth Every 8 (Eight) Hours As Needed for Anxiety. 8/29/22  Yes Eran Forbes MD   methocarbamol (ROBAXIN) 750 MG tablet Take 1 tablet by mouth Every 6 (Six) Hours As Needed for Muscle Spasms.  Patient taking differently: Take 750 mg by mouth Every 8 (Eight) Hours As Needed for Muscle Spasms. 8/29/22  Yes Eran Forbes MD   montelukast (SINGULAIR) 10 MG tablet Take 1 tablet by mouth Every Night. 7/12/13  Yes Luke Grove MD   polyethylene glycol (MIRALAX) 17 GM/SCOOP powder Take 17 g by mouth Daily.   Yes Luke Grove MD   predniSONE (DELTASONE) 5 MG tablet Take 3 tablets by mouth  Daily With Breakfast.  Patient taking differently: Take 10 mg by mouth Daily With Breakfast. 11/2/22  Yes Pablo Sherman Jr., MD   sennosides-docusate (PERICOLACE) 8.6-50 MG per tablet Take 1 tablet by mouth Daily.   Yes ProviderLuke MD   sodium bicarbonate 650 MG tablet Take 1 tablet by mouth 2 (Two) Times a Day. 8/2/22  Yes Alfredito Cee MD   tamsulosin (FLOMAX) 0.4 MG capsule 24 hr capsule Take 0.4 mg by mouth every night at bedtime.   Yes ProviderLuke MD   melatonin 1 MG tablet Take  by mouth.    ProviderLuke MD       Allergies:  Allergies   Allergen Reactions   • Erythromycin Unknown (See Comments) and Other (See Comments)     Pt states she does not remember but it was many years ago  Pt states she does not remember but it was many years ago   • Statins Myalgia   • Cephalexin Other (See Comments) and Unknown (See Comments)     June 2022 Pt has tolerated ceftriaxone and cefepime during admission.   Pt states she does not remember reaction but it was many years ago   • Penicillins Rash   • Sulfa Antibiotics Itching and Rash       Objective     Vitals:   Temp:  [96.8 °F (36 °C)-98.2 °F (36.8 °C)] 98.2 °F (36.8 °C)  Heart Rate:  [63-75] 69  Resp:  [16-18] 16  BP: (136-184)/(56-78) 136/56    Intake/Output Summary (Last 24 hours) at 12/21/2022 1616  Last data filed at 12/21/2022 1300  Gross per 24 hour   Intake 720 ml   Output 2550 ml   Net -1830 ml       Physical Exam:   Constitutional: Awake, alert, NAD  HEENT: Sclera anicteric, no conjunctival injection, MMM legally blind  Neck: Supple, no rotted bruit, trachea at midline, no JVD  Respiratory: Diminished in bases, few crackles, nonlabored on RA  Cardiovascular: RRR, with ectopy; 2/6M, no rub  Gastrointestinal: BS +, soft, nontender and nondistended  : No palpable bladder  Musculoskeletal: Trace-+1 edema; extensively bruised right foot  Psychiatric: Appropriate affect, cooperative, oriented  Neurologic: moving all  extremities, normal speech and mental status  Skin: Warm and dry       Scheduled Meds:     amLODIPine, 10 mg, Oral, Daily  carvedilol, 12.5 mg, Oral, BID  cholecalciferol, 1,000 Units, Oral, Daily  famotidine, 10 mg, Oral, BID  furosemide, 40 mg, Intravenous, Q12H  gabapentin, 600 mg, Oral, BID  hydrALAZINE, 50 mg, Oral, BID  HYDROcodone-acetaminophen, 1 tablet, Oral, Q6H  insulin glargine, 20 Units, Subcutaneous, Nightly  insulin lispro, 0-9 Units, Subcutaneous, TID With Meals  levothyroxine, 50 mcg, Oral, Daily  lidocaine, 1 patch, Transdermal, Q24H  montelukast, 10 mg, Oral, Nightly  polyethylene glycol, 17 g, Oral, Daily  predniSONE, 10 mg, Oral, Daily With Breakfast  sennosides-docusate, 1 tablet, Oral, Daily  sodium bicarbonate, 650 mg, Oral, BID  tamsulosin, 0.4 mg, Oral, Daily      IV Meds:        Results Reviewed:   I have personally reviewed the results from the time of this admission to 12/21/2022 16:16 EST     Lab Results   Component Value Date    GLUCOSE 182 (H) 12/21/2022    CALCIUM 8.9 12/21/2022     12/21/2022    K 4.4 12/21/2022    CO2 30.4 (H) 12/21/2022     12/21/2022    BUN 35 (H) 12/21/2022    CREATININE 1.92 (H) 12/21/2022    EGFRIFAFRI 51 (L) 03/22/2019    EGFRIFNONA 19 (L) 02/25/2022    BCR 18.2 12/21/2022    ANIONGAP 10.6 12/21/2022      Lab Results   Component Value Date    MG 1.9 12/20/2022    PHOS 3.2 11/04/2022    ALBUMIN 3.30 (L) 12/20/2022           Assessment / Plan     ASSESSMENT:  1.  NADER on CKD 3B, nonoliguric, with expected rise in SCR following diuresis.  Likely contraction alkalosis from diuretic and use of oral sodium bicarbonate; potassium is normal.  Improving volume excess by imaging and exam.  Urinalysis bland with no blood and only trace protein  2.  CHF, improving with IV diuretic.  Chest x-ray today shows decreasing atelectasis/infiltrate vs yesterday  3.  Recent fall 12/7/22 causing right first toe fracture  4.  Chronic ITP  5.  DM2 with hyperglycemia  6.   Elevated troponin  7.  Aortic valve stenosis s/p tissue AVR    PLAN:  1.  Continue IV furosemide, but anticipate transitioning soon to oral regimen more potent than her home dose (which was furosemide 20 mg once daily)  2.  Discontinue sodium bicarbonate for now  3.  TSH for completeness' sake  4.  Bladder scan each shift    Thank you for involving us in the care of Helena Carmen.  Please feel free to call with any questions.    Jordan Vázquez MD  12/21/22  16:16 [facility]    [facility]  [phone]      Please note that portions of this note were completed with a voice recognition program.

## 2022-12-21 NOTE — CONSULTS
Patient Name: Helena Carmen  :1931  89 y.o.    Date of Admission: 2022  Date of Consultation:  22  Encounter Provider: RIGO Poole  [facility] of Service[facility]  Referring Provider: Mango Arnold MD  Patient Care Team:  Mariah Hickman MD as PCP - General (Family Medicine)  Beth Butcher MD as Consulting Physician (Cardiology)  Rolando Wick MD as Consulting Physician (Nephrology)  Pablo Sherman Jr., MD as Consulting Physician (Hematology and Oncology)  aMngo Arnold MD as Referring Physician (Internal Medicine)      Chief complaint: foot pain, shortness of breath    History of Present Illness:Ms. Carmen is a 89 year old woman with chronic diastolic heart failure and severe aortic stenosis status post aortic valve replacement with 21 mm  [place] trifecta bovine pericardial valve in 2013. Dr. Butcher is her primary cardiologist. She has chronic kidney disease, hypertension, hypothyroidism. Some documentation in the chart notes a history of atrial fibrillation but family denies this diagnosis. She has a prior DVT and completed course of anticoagulation. She follows with Dr. Sherman for thrombocytopenia. She is on chronic steroids.     She was admitted in August with COVID. She had an elevated troponin. Echo was unremarkable.     She was admitted in October for back pain. She had a L5 fracture. Non surgical management recommended. She did not require cardiac evaluation during that admission.     She was seen in the heart failure clinic on . She was pretty stable at that time. Weight was 162. No changes made. They were going to consider trying Farxiga in the future. She developed uti on jardiance.     She presented to the emergency room yesterday with right foot pain after suffering a fall. She was noted to have shortness of breath. CXR with pulmonary edema vs pna. probnp 4161. Trop 0.051 then 0.052. she has not been  having any chest pain. She does recall what sounds like some PND. She denies orthopnea. She is currently on room air and just trace edema.     Weight today is below baseline. Family at bedside says she hasn't weighed this in many years. Baseline 162ish.    She diuresed over 2 liters. Urine output has slowed down.     Echo 7/31/2022  • Left ventricular ejection fraction appears to be 61 - 65%. Left ventricular systolic function is normal.  • Left ventricular wall thickness is consistent with mild concentric hypertrophy.  • Left ventricular diastolic function is consistent with (grade II w/high LAP) pseudonormalization.  • Normal right ventricular cavity size and systolic function noted.  • The left atrial cavity is moderately dilated.  • There is a bioprosthetic aortic valve present. The aortic valve peak and mean gradients are within defined limits. The prosthetic aortic valve is grossly normal.  • There is severe mitral annular calcification. The mitral valve leaflets are thickened. There are several calcified chordae  • Mild mitral valve regurgitation is present. No significant mitral valve stenosis is present.  • Mild to moderate tricuspid valve regurgitation is present.  • Calculated right ventricular systolic pressure from tricuspid regurgitation is 37 mmHg.  • There is no evidence of pericardial effusion         Past Medical History:   Diagnosis Date   • Aortic valve stenosis     s/p tissue AVR   • Back pain    • CKD (chronic kidney disease)    • Colitis due to Clostridioides difficile 01/26/2022   • Diastolic dysfunction     Grade 2 per echocardiogram 2013   • Diverticulosis    • Exertional shortness of breath    • Heart disease    • Hiatal hernia    • Hyperlipidemia    • Hypertension    • Hyperthyroidism    • Hypertriglyceridemia 05/31/2018   • Hypothyroidism    • Left ventricular hypertrophy    • Legally blind    • Liver disease    • Macular degeneration    • Mitral regurgitation    • Osteoarthritis of hip     • Pancreatitis 01/26/2022   • Paroxysmal atrial fibrillation (HCC)    • Premature ventricular contractions    • Pulmonary hypertension (HCC)    • Renal insufficiency syndrome    • Type 2 diabetes mellitus (HCC)    • Uterine cancer (HCC)        Past Surgical History:   Procedure Laterality Date   • AORTIC VALVE REPAIR/REPLACEMENT     • CATARACT EXTRACTION      1970, 1999   • CHOLECYSTECTOMY     • ENDOSCOPY  08/15/2014    no gross lesions in stomach/duodenum, erythrematous mucosa in stomach   • HYSTERECTOMY  2007   • STERNOTOMY           Prior to Admission medications    Medication Sig Start Date End Date Taking? Authorizing Provider   amLODIPine (NORVASC) 10 MG tablet Take 1 tablet by mouth daily. 9/19/13  Yes Luke Grove MD   carvedilol (COREG) 12.5 MG tablet Take 1 tablet by mouth 2 (Two) Times a Day. 8/22/22  Yes Evi Fenton APRN   cetirizine (zyrTEC) 5 MG tablet Take 1 tablet by mouth Daily As Needed for Allergies. 8/29/22  Yes Eran Forbes MD   cholecalciferol (VITAMIN D3) 25 MCG (1000 UT) tablet Take 1 tablet by mouth Daily. 8/30/22  Yes Eran Forbes MD   famotidine (PEPCID) 10 MG tablet Take 10 mg by mouth 2 (Two) Times a Day.   Yes ProviderLuke MD   furosemide (LASIX) 20 MG tablet Take 1 tablet by mouth Daily. 6/30/22  Yes Sima Blum APRN   gabapentin (NEURONTIN) 400 MG capsule Take 600 mg by mouth 2 (Two) Times a Day.   Yes Luke Grove MD   hydrALAZINE (APRESOLINE) 25 MG tablet Take 3 tablets by mouth Every 8 (Eight) Hours.  Patient taking differently: Take 50 mg by mouth 2 (Two) Times a Day. 8/29/22  Yes Eran Forbes MD   HYDROcodone-acetaminophen (NORCO) 7.5-325 MG per tablet Take 1 tablet by mouth Every 6 (Six) Hours. 11/3/22  Yes Luke Grove MD   insulin glargine (LANTUS, SEMGLEE) 100 UNIT/ML injection Inject 34 Units under the skin into the appropriate area as directed Every Night.  Patient taking  differently: Inject 20 Units under the skin into the appropriate area as directed Every Night. 8/29/22  Yes Eran Forbes MD   insulin lispro (humaLOG) 100 UNIT/ML injection Inject 0-10 Units under the skin into the appropriate area as directed 3 (Three) Times a Day Before Meals. 2 units for every 50 > 150   Yes Luke Grove MD   levothyroxine (SYNTHROID, LEVOTHROID) 25 MCG tablet Take 50 mcg by mouth Daily. 3/8/13  Yes Luke Grove MD   lidocaine (LIDODERM) 5 % Place 1 patch on the skin as directed by provider Daily. Remove & Discard patch within 12 hours or as directed by MD 12/7/22  Yes Luly Gaines MD   Loratadine (CLARITIN PO) Take  by mouth.   Yes Luke Grove MD   LORazepam (ATIVAN) 0.5 MG tablet Take 1 tablet by mouth Every 8 (Eight) Hours As Needed for Anxiety. 8/29/22  Yes Eran Forbes MD   methocarbamol (ROBAXIN) 750 MG tablet Take 1 tablet by mouth Every 6 (Six) Hours As Needed for Muscle Spasms.  Patient taking differently: Take 750 mg by mouth Every 8 (Eight) Hours As Needed for Muscle Spasms. 8/29/22  Yes Eran Forbes MD   montelukast (SINGULAIR) 10 MG tablet Take 1 tablet by mouth Every Night. 7/12/13  Yes Luke Grove MD   polyethylene glycol (MIRALAX) 17 GM/SCOOP powder Take 17 g by mouth Daily.   Yes Luke Grove MD   predniSONE (DELTASONE) 5 MG tablet Take 3 tablets by mouth Daily With Breakfast.  Patient taking differently: Take 10 mg by mouth Daily With Breakfast. 11/2/22  Yes Pablo Sherman Jr., MD   sennosides-docusate (PERICOLACE) 8.6-50 MG per tablet Take 1 tablet by mouth Daily.   Yes Luke Grove MD   sodium bicarbonate 650 MG tablet Take 1 tablet by mouth 2 (Two) Times a Day. 8/2/22  Yes Alfredito Cee MD   tamsulosin (FLOMAX) 0.4 MG capsule 24 hr capsule Take 0.4 mg by mouth every night at bedtime.   Yes Luke Grove MD   melatonin 1 MG tablet Take  by mouth.    [place]  Historical, MD       Allergies   Allergen Reactions   • Erythromycin Unknown (See Comments) and Other (See Comments)     Pt states she does not remember but it was many years ago  Pt states she does not remember but it was many years ago   • Statins Myalgia   • Cephalexin Other (See Comments) and Unknown (See Comments)     2022 Pt has tolerated ceftriaxone and cefepime during admission.   Pt states she does not remember reaction but it was many years ago   • Penicillins Rash   • Sulfa Antibiotics Itching and Rash       Social History     Socioeconomic History   • Marital status:    Tobacco Use   • Smoking status: Former     Packs/day: 0.50     Types: Cigarettes     Quit date: 7/10/1969     Years since quittin.4   • Smokeless tobacco: Never   Vaping Use   • Vaping Use: Never used   Substance and Sexual Activity   • Alcohol use: No     Comment: caffeine use - coffee 2 cups daily   • Drug use: No   • Sexual activity: Defer       Family History   Problem Relation Age of Onset   • Heart disease Mother    • Hypertension Mother    • Stroke Mother    • Diabetes Mother         mellitus   • Other Other         cardiovascular disorder       REVIEW OF SYSTEMS:   All systems reviewed.  Pertinent positives identified in HPI.  All other systems are negative.      Objective:     Vitals:    22 2348 22 0541 22 0838 22 1439   BP:   157/69 136/56   BP Location:   Right arm Right arm   Patient Position:   Lying Lying   Pulse: 72  63 69   Resp: 18  16 16   Temp: 96.8 °F (36 °C)  97.8 °F (36.6 °C) 98.2 °F (36.8 °C)   TempSrc: Oral  Oral Oral   SpO2: 91%  93% 93%   Weight:  74.7 kg (164 lb 11.2 oz)     Height:         Body mass index is 35.64 kg/m².    Physical Exam:  Constitutional: She is oriented to person, place, and time. She appears well-developed. She does not appear ill.   HENT:   Head: Normocephalic and atraumatic. Head is without contusion.   Right Ear: Hearing normal. No drainage.   Left  Ear: Hearing normal. No drainage.   Nose: No nasal deformity. No epistaxis.   Eyes: Lids are normal. Right eye exhibits no exudate. Left eye exhibits no exudate.  Neck: No JVD present. Cardiovascular: Normal rate, regular rhythm and normal heart sounds.    Pulses:       Posterior tibial pulses are 2+ on the right side, and 2+ on the left side.   Pulmonary/Chest: Effort normal and breath sounds normal.   Abdominal: Soft. Normal appearance and bowel sounds are normal. There is no tenderness.   Musculoskeletal: Normal range of motion.        Right shoulder: She exhibits no deformity.        Left shoulder: She exhibits no deformity.   Neurological: She is alert and oriented to person, place, and time. She has normal strength.   Skin: Skin is warm, dry and intact. No rash noted.   Psychiatric: She has a normal mood and affect. Her behavior is normal. Thought content normal.   Vitals reviewed      Lab Review:     Results from last 7 days   Lab Units 12/21/22  0724 12/20/22  1350   SODIUM mmol/L 141 136   POTASSIUM mmol/L 4.4 4.5   CHLORIDE mmol/L 100 99   CO2 mmol/L 30.4* 26.8   BUN mg/dL 35* 30*   CREATININE mg/dL 1.92* 1.59*   CALCIUM mg/dL 8.9 8.7   BILIRUBIN mg/dL  --  0.7   ALK PHOS U/L  --  72   ALT (SGPT) U/L  --  32   AST (SGOT) U/L  --  21   GLUCOSE mg/dL 182* 325*     Results from last 7 days   Lab Units 12/20/22  2113 12/20/22  1350   TROPONIN T ng/mL 0.052* 0.051*     Results from last 7 days   Lab Units 12/21/22  0724   WBC 10*3/mm3 6.92   HEMOGLOBIN g/dL 12.4   HEMATOCRIT % 38.1   PLATELETS 10*3/mm3 66*         Results from last 7 days   Lab Units 12/20/22  1207   MAGNESIUM mg/dL 1.9           Current Facility-Administered Medications:   •  acetaminophen (TYLENOL) tablet 650 mg, 650 mg, Oral, Q4H PRN, Isidra Mancuso MD  •  amLODIPine (NORVASC) tablet 10 mg, 10 mg, Oral, Daily, Isidra Mancuso MD, 10 mg at 12/21/22 1111  •  carvedilol (COREG) tablet 12.5 mg, 12.5 mg, Oral, BID, Stingl, Isidra  MD Shivam, 12.5 mg at 12/21/22 1111  •  cholecalciferol (VITAMIN D3) tablet 1,000 Units, 1,000 Units, Oral, Daily, Isidra Mancuso MD, 1,000 Units at 12/21/22 1111  •  dextrose (D50W) (25 g/50 mL) IV injection 25 g, 25 g, Intravenous, Q15 Min PRN, Isidra Mancuso MD  •  dextrose (GLUTOSE) oral gel 15 g, 15 g, Oral, Q15 Min PRN, Isidra Mancuso MD  •  famotidine (PEPCID) tablet 10 mg, 10 mg, Oral, BID, Isidra Mancuso MD, 10 mg at 12/21/22 1111  •  furosemide (LASIX) injection 40 mg, 40 mg, Intravenous, Q12H, Isidra Mancuso MD, 40 mg at 12/21/22 1111  •  gabapentin (NEURONTIN) capsule 600 mg, 600 mg, Oral, BID, Isidra Mancuso MD, 600 mg at 12/21/22 1110  •  glucagon (human recombinant) (GLUCAGEN DIAGNOSTIC) injection 1 mg, 1 mg, Intramuscular, Q15 Min PRN, Isidra Mancuso MD  •  hydrALAZINE (APRESOLINE) tablet 50 mg, 50 mg, Oral, BID, Isidra Mancuso MD, 50 mg at 12/21/22 1111  •  HYDROcodone-acetaminophen (NORCO) 7.5-325 MG per tablet 1 tablet, 1 tablet, Oral, Q6H, Isidra Mancuso MD, 1 tablet at 12/21/22 1111  •  insulin glargine (LANTUS, SEMGLEE) injection 20 Units, 20 Units, Subcutaneous, Nightly, Isidra Mancuso MD, 20 Units at 12/20/22 2154  •  insulin lispro (ADMELOG) injection 0-9 Units, 0-9 Units, Subcutaneous, TID With Meals, Isidra Mancuso MD, 6 Units at 12/21/22 1335  •  levothyroxine (SYNTHROID, LEVOTHROID) tablet 50 mcg, 50 mcg, Oral, Daily, Isidra Mancuso MD, 50 mcg at 12/21/22 1111  •  lidocaine (LIDODERM) 5 % 1 patch, 1 patch, Transdermal, Q24H, Isidra Mancuso MD  •  LORazepam (ATIVAN) tablet 0.5 mg, 0.5 mg, Oral, Q8H PRN, Isidra Mancuso MD, 0.5 mg at 12/21/22 1120  •  melatonin tablet 3 mg, 3 mg, Oral, Nightly PRN, Isidra Mancuso MD  •  methocarbamol (ROBAXIN) tablet 750 mg, 750 mg, Oral, Q8H PRN, Isidra Mancuso MD  •  montelukast (SINGULAIR) tablet 10 mg, 10 mg, Oral, Nightly,  Isidra Mancuso MD  •  nitroglycerin (NITROSTAT) SL tablet 0.4 mg, 0.4 mg, Sublingual, Q5 Min PRN, Isidra Mancuso MD  •  ondansetron (ZOFRAN) tablet 4 mg, 4 mg, Oral, Q6H PRN **OR** ondansetron (ZOFRAN) injection 4 mg, 4 mg, Intravenous, Q6H PRN, Isidra Mancuso MD  •  polyethylene glycol (MIRALAX) packet 17 g, 17 g, Oral, Daily, Isidra Mancuso MD, 17 g at 12/21/22 1112  •  predniSONE (DELTASONE) tablet 10 mg, 10 mg, Oral, Daily With Breakfast, Isidra Mancuso MD, 10 mg at 12/21/22 1121  •  sennosides-docusate (PERICOLACE) 8.6-50 MG per tablet 1 tablet, 1 tablet, Oral, Daily, Isidra Mancuso MD, 1 tablet at 12/21/22 1111  •  sodium bicarbonate tablet 650 mg, 650 mg, Oral, BID, Isidra Mancuso MD, 650 mg at 12/21/22 1111  •  [COMPLETED] Insert Peripheral IV, , , Once **AND** sodium chloride 0.9 % flush 10 mL, 10 mL, Intravenous, PRN, Shikha Pozo APRN, 10 mL at 12/20/22 2111  •  tamsulosin (FLOMAX) 24 hr capsule 0.4 mg, 0.4 mg, Oral, Daily, Isidra Mancuso MD, 0.4 mg at 12/21/22 1111    Assessment and Plan:       1. Acute on chronic diastolic heart failure - responded nicely to diuresis. Weight down. Rise in serum creatinine noted. Nephrology was consulted by internal medicine.   2. Chronic kidney disease - nephrology as above.   3. History of DVT - completed course of apixaban.   4. Questionable history of atrial fibrillation - family denies this. Follow on tele. Can consider 14 day monitor at VT.   5. NSVT, continue beta blocker. Electrolytes stable. She has preserved LVEF  (last evaluated July 2022)  6.  severe aortic stenosis status post aortic valve replacement with 21 mm  [place] trifecta bovine pericardial valve in June 2013  7. Hypertension - BP above goal. Follow trends and adjust medications as indicated. Will observe for now.  8. Obesity BMI 35  9. Fall with right foot injury    Jeanette Gandhi, APRN  12/21/22  14:53 EST

## 2022-12-21 NOTE — PROGRESS NOTES
DAILY PROGRESS NOTE  [facility]    Patient Identification:  Name: Helena Carmen  Age: 89 y.o.  Sex: female  :  1931  MRN: [id number]         Primary Care Physician: Mariah Hickman MD    Subjective:  Interval History: Patient thinks that her breathing is about the same, complains of PND    Objective:    Scheduled Meds:amLODIPine, 10 mg, Oral, Daily  carvedilol, 12.5 mg, Oral, BID  cholecalciferol, 1,000 Units, Oral, Daily  famotidine, 10 mg, Oral, BID  furosemide, 40 mg, Intravenous, Q12H  gabapentin, 600 mg, Oral, BID  hydrALAZINE, 50 mg, Oral, BID  HYDROcodone-acetaminophen, 1 tablet, Oral, Q6H  insulin glargine, 20 Units, Subcutaneous, Nightly  insulin lispro, 0-9 Units, Subcutaneous, TID With Meals  levothyroxine, 50 mcg, Oral, Daily  lidocaine, 1 patch, Transdermal, Q24H  montelukast, 10 mg, Oral, Nightly  polyethylene glycol, 17 g, Oral, Daily  predniSONE, 10 mg, Oral, Daily With Breakfast  sennosides-docusate, 1 tablet, Oral, Daily  sodium bicarbonate, 650 mg, Oral, BID  tamsulosin, 0.4 mg, Oral, Daily      Continuous Infusions:     Vital signs in last 24 hours:  Temp:  [96.8 °F (36 °C)-97.8 °F (36.6 °C)] 97.8 °F (36.6 °C)  Heart Rate:  [63-75] 63  Resp:  [16-18] 16  BP: (149-184)/(57-78) 157/69    Intake/Output:    Intake/Output Summary (Last 24 hours) at 2022 1436  Last data filed at 2022 0838  Gross per 24 hour   Intake 360 ml   Output 2550 ml   Net -2190 ml       Exam:  /69 (BP Location: Right arm, Patient Position: Lying)   Pulse 63   Temp 97.8 °F (36.6 °C) (Oral)   Resp 16   Ht 144.8 cm (57\")   Wt 74.7 kg (164 lb 11.2 oz)   SpO2 93%   BMI 35.64 kg/m²   O2 was 88%  General Appearance:    Alert, cooperative, no distress, AAOx3, snoring initially   Head:    Normocephalic, without obvious abnormality, atraumatic   Eyes:    PERRL, conjunctivae/corneas clear, EOM's intact, both eyes, glasses   Throat:   Lips, tongue, gums normal; oral mucosa pink and  moist       Lungs:    Decreased breath sounds in the bases bilaterally with crackles, moderately labored,    Chest Wall:    No tenderness or deformity    Heart:    Regular rate and rhythm, S1 and S2 normal,   + sys murmur,no  Rub or gallop   Abdomen:     Soft, nontender, bowel sounds active, no masses, no organomegaly obese   Extremities:   Extremities normal, atraumatic, no cyanosis or edema   Pulses:   Pulses palpable in all extremities   Skin:   Skin is warm and dry, + multiple bruising due to thrombocytopenia   Neurologic:   CNII-XII intact, motor strength grossly intact, sensation grossly intact to light touch, no focal deficits noted        Data Review:        Results from last 7 days   Lab Units 12/21/22  0724   HEMOGLOBIN A1C % 6.30*     Results from last 7 days   Lab Units 12/21/22  0724   WBC 10*3/mm3 6.92   HEMOGLOBIN g/dL 12.4   HEMATOCRIT % 38.1   PLATELETS 10*3/mm3 66*     Results from last 7 days   Lab Units 12/21/22  0724 12/20/22  1350   SODIUM mmol/L 141 136   POTASSIUM mmol/L 4.4 4.5   CHLORIDE mmol/L 100 99   CO2 mmol/L 30.4* 26.8   BUN mg/dL 35* 30*   CREATININE mg/dL 1.92* 1.59*   CALCIUM mg/dL 8.9 8.7   BILIRUBIN mg/dL  --  0.7   ALK PHOS U/L  --  72   ALT (SGPT) U/L  --  32   AST (SGOT) U/L  --  21   GLUCOSE mg/dL 182* 325*     7/2022 ewcho  • Left ventricular ejection fraction appears to be 61 - 65%. Left ventricular systolic function is normal.  • Left ventricular wall thickness is consistent with mild concentric hypertrophy.  • Left ventricular diastolic function is consistent with (grade II w/high LAP) pseudonormalization.  • Normal right ventricular cavity size and systolic function noted.  • The left atrial cavity is moderately dilated.  • There is a bioprosthetic aortic valve present. The aortic valve peak and mean gradients are within defined limits. The prosthetic aortic valve is grossly normal.  • There is severe mitral annular calcification. The mitral valve leaflets are thickened.  There are several calcified chordae  • Mild mitral valve regurgitation is present. No significant mitral valve stenosis is present.  • Mild to moderate tricuspid valve regurgitation is present.  • Calculated right ventricular systolic pressure from tricuspid regurgitation is 37 mmHg.  • There is no evidence of pericardial effusion      Assessment:  Active Hospital Problems    Diagnosis  POA   • **Acute diastolic congestive heart failure (HCC) [I50.31]  Yes   • Chronic ITP (idiopathic thrombocytopenia) (HCC) [D69.3]  Yes   • Other cirrhosis of liver (HCC) [K74.69]  Yes   • Type 2 diabetes mellitus with hyperglycemia (HCC) [E11.65]  Yes   • Gastroparesis [K31.84]  Yes   • Pulmonary hypertension (HCC) [I27.20]  Yes   • Paroxysmal atrial fibrillation (HCC) [I48.0]  Yes   • Essential hypertension [I10]  Yes   • Secondary hyperparathyroidism (HCC) [N25.81]  Yes   • Stage 4 chronic kidney disease (HCC) [N18.4]  Yes      Resolved Hospital Problems   No resolved problems to display.       + Acute on chronic diastolic heart failure with valvular heart disease  -daily weights, strict I&O's  -IV lasix BID  -low sodium diet, nutrition to see  -on  BB-does not seem to be a patent ACE inhibitor secondary to her CKD 3B-4  -cardiology consult  -echo-done in July will defer to cardiology if feels need to repeat  -Continue to diurese    +CKD 3B-4  -Appreciate renal's input and in diuresing    + diabetes type 2 on chronic insulin  -Accu-Cheks  -hypoglycemia protocol  -sliding scale insulin to cover outlier blood sugars    +history of chronic ITP on steroids    +patient was diagnosed with acute DVT couple months ago and anticoagulation     + Snoring/hypoxia patient was getting 88% and staying there even after she woke up.  Consider doing a nocturnal oximetry before discharge or in oximetry when ambulating.    + Lives by herself-we will get PT and OT to evaluate and treat    + Left buttocks perirectal area with wound, need to  turn    +pulm  Hypertension likely contributing to chronic shortness of breath-suspect sleep apnea and that might be contributing    Elizabeth Lackey MD  12/21/2022  14:36 EST

## 2022-12-21 NOTE — CONSULTS
I was requested to see patient to provide spiritual and emotional support.  Patient is a member of the [employer].  She has a strong nacho.  She mentioned her 3 children who have been very supportive.  We concluded our time with prayer.

## 2022-12-22 LAB
ALBUMIN SERPL-MCNC: 3 G/DL (ref 3.5–5.2)
ANION GAP SERPL CALCULATED.3IONS-SCNC: 10.2 MMOL/L (ref 5–15)
BUN SERPL-MCNC: 46 MG/DL (ref 8–23)
BUN/CREAT SERPL: 21.8 (ref 7–25)
CALCIUM SPEC-SCNC: 8.8 MG/DL (ref 8.2–9.6)
CHLORIDE SERPL-SCNC: 100 MMOL/L (ref 98–107)
CO2 SERPL-SCNC: 29.8 MMOL/L (ref 22–29)
CREAT SERPL-MCNC: 2.11 MG/DL (ref 0.57–1)
EGFRCR SERPLBLD CKD-EPI 2021: 21.8 ML/MIN/1.73
GLUCOSE BLDC GLUCOMTR-MCNC: 189 MG/DL (ref 70–130)
GLUCOSE BLDC GLUCOMTR-MCNC: 226 MG/DL (ref 70–130)
GLUCOSE BLDC GLUCOMTR-MCNC: 368 MG/DL (ref 70–130)
GLUCOSE BLDC GLUCOMTR-MCNC: 408 MG/DL (ref 70–130)
GLUCOSE BLDC GLUCOMTR-MCNC: 411 MG/DL (ref 70–130)
GLUCOSE SERPL-MCNC: 206 MG/DL (ref 65–99)
MAGNESIUM SERPL-MCNC: 1.8 MG/DL (ref 1.7–2.3)
PHOSPHATE SERPL-MCNC: 5 MG/DL (ref 2.5–4.5)
POTASSIUM SERPL-SCNC: 3.8 MMOL/L (ref 3.5–5.2)
SODIUM SERPL-SCNC: 140 MMOL/L (ref 136–145)

## 2022-12-22 PROCEDURE — 83735 ASSAY OF MAGNESIUM: CPT | Performed by: INTERNAL MEDICINE

## 2022-12-22 PROCEDURE — 63710000001 PREDNISONE PER 5 MG: Performed by: INTERNAL MEDICINE

## 2022-12-22 PROCEDURE — 25010000002 FUROSEMIDE PER 20 MG: Performed by: INTERNAL MEDICINE

## 2022-12-22 PROCEDURE — 63710000001 INSULIN LISPRO (HUMAN) PER 5 UNITS: Performed by: INTERNAL MEDICINE

## 2022-12-22 PROCEDURE — 80069 RENAL FUNCTION PANEL: CPT | Performed by: INTERNAL MEDICINE

## 2022-12-22 PROCEDURE — 99232 SBSQ HOSP IP/OBS MODERATE 35: CPT | Performed by: INTERNAL MEDICINE

## 2022-12-22 PROCEDURE — 82962 GLUCOSE BLOOD TEST: CPT

## 2022-12-22 PROCEDURE — 97162 PT EVAL MOD COMPLEX 30 MIN: CPT

## 2022-12-22 PROCEDURE — 97110 THERAPEUTIC EXERCISES: CPT

## 2022-12-22 PROCEDURE — 63710000001 INSULIN LISPRO (HUMAN) PER 5 UNITS: Performed by: NURSE PRACTITIONER

## 2022-12-22 RX ORDER — FUROSEMIDE 40 MG/1
40 TABLET ORAL DAILY
Status: DISCONTINUED | OUTPATIENT
Start: 2022-12-23 | End: 2022-12-25 | Stop reason: HOSPADM

## 2022-12-22 RX ORDER — INSULIN LISPRO 100 [IU]/ML
0-9 INJECTION, SOLUTION INTRAVENOUS; SUBCUTANEOUS
Status: DISCONTINUED | OUTPATIENT
Start: 2022-12-22 | End: 2022-12-25 | Stop reason: HOSPADM

## 2022-12-22 RX ADMIN — FAMOTIDINE 10 MG: 20 TABLET, FILM COATED ORAL at 22:08

## 2022-12-22 RX ADMIN — HYDROCODONE BITARTRATE AND ACETAMINOPHEN 1 TABLET: 7.5; 325 TABLET ORAL at 16:18

## 2022-12-22 RX ADMIN — POLYETHYLENE GLYCOL 3350 17 G: 17 POWDER, FOR SOLUTION ORAL at 09:00

## 2022-12-22 RX ADMIN — GABAPENTIN 600 MG: 300 CAPSULE ORAL at 22:08

## 2022-12-22 RX ADMIN — FUROSEMIDE 40 MG: 10 INJECTION, SOLUTION INTRAMUSCULAR; INTRAVENOUS at 09:00

## 2022-12-22 RX ADMIN — PREDNISONE 10 MG: 10 TABLET ORAL at 09:00

## 2022-12-22 RX ADMIN — AMLODIPINE BESYLATE 10 MG: 10 TABLET ORAL at 09:00

## 2022-12-22 RX ADMIN — HYDROCODONE BITARTRATE AND ACETAMINOPHEN 1 TABLET: 7.5; 325 TABLET ORAL at 05:46

## 2022-12-22 RX ADMIN — CARVEDILOL 12.5 MG: 12.5 TABLET, FILM COATED ORAL at 09:00

## 2022-12-22 RX ADMIN — LIDOCAINE 1 PATCH: 50 PATCH TOPICAL at 22:10

## 2022-12-22 RX ADMIN — HYDRALAZINE HYDROCHLORIDE 50 MG: 50 TABLET, FILM COATED ORAL at 09:00

## 2022-12-22 RX ADMIN — LEVOTHYROXINE SODIUM 50 MCG: 0.05 TABLET ORAL at 09:00

## 2022-12-22 RX ADMIN — HYDRALAZINE HYDROCHLORIDE 50 MG: 50 TABLET, FILM COATED ORAL at 22:08

## 2022-12-22 RX ADMIN — GABAPENTIN 600 MG: 300 CAPSULE ORAL at 09:00

## 2022-12-22 RX ADMIN — INSULIN GLARGINE-YFGN 20 UNITS: 100 INJECTION, SOLUTION SUBCUTANEOUS at 22:09

## 2022-12-22 RX ADMIN — Medication 1000 UNITS: at 09:00

## 2022-12-22 RX ADMIN — INSULIN LISPRO 8 UNITS: 100 INJECTION, SOLUTION INTRAVENOUS; SUBCUTANEOUS at 17:39

## 2022-12-22 RX ADMIN — FAMOTIDINE 10 MG: 20 TABLET, FILM COATED ORAL at 08:59

## 2022-12-22 RX ADMIN — CARVEDILOL 12.5 MG: 12.5 TABLET, FILM COATED ORAL at 22:08

## 2022-12-22 RX ADMIN — HYDROCODONE BITARTRATE AND ACETAMINOPHEN 1 TABLET: 7.5; 325 TABLET ORAL at 10:25

## 2022-12-22 RX ADMIN — INSULIN LISPRO 2 UNITS: 100 INJECTION, SOLUTION INTRAVENOUS; SUBCUTANEOUS at 13:55

## 2022-12-22 RX ADMIN — HYDROCODONE BITARTRATE AND ACETAMINOPHEN 1 TABLET: 7.5; 325 TABLET ORAL at 22:08

## 2022-12-22 RX ADMIN — DOCUSATE SODIUM 50MG AND SENNOSIDES 8.6MG 1 TABLET: 8.6; 5 TABLET, FILM COATED ORAL at 09:00

## 2022-12-22 RX ADMIN — ACETAMINOPHEN 650 MG: 325 TABLET, FILM COATED ORAL at 18:36

## 2022-12-22 RX ADMIN — INSULIN LISPRO 9 UNITS: 100 INJECTION, SOLUTION INTRAVENOUS; SUBCUTANEOUS at 22:09

## 2022-12-22 RX ADMIN — TAMSULOSIN HYDROCHLORIDE 0.4 MG: 0.4 CAPSULE ORAL at 09:00

## 2022-12-22 RX ADMIN — MONTELUKAST SODIUM 10 MG: 10 TABLET, FILM COATED ORAL at 22:08

## 2022-12-22 RX ADMIN — INSULIN LISPRO 4 UNITS: 100 INJECTION, SOLUTION INTRAVENOUS; SUBCUTANEOUS at 08:59

## 2022-12-22 NOTE — PLAN OF CARE
Goal Outcome Evaluation:    Patient is a pleasant 89 y.o. female admitted to [facility] for acute diastolic CHF and pulmonary edema on 12/20/2022. PMHx includes recent fall- imaging revealed Fracture of the proximal phalanx of the right great toe. Significant bruising around R ankle noted upon arrival. Patient ambulated household distances with a walker at baseline and lives at her home where family is with 24/7 per daughter at bedside. Daughter confirmed they have a good set up. Today, patient performed bed mobility with SBA, required CGA for transfers, and ambulated just a couple side steps towards HOB. Mobility limited due to pain in R knee and R hip- daughter voiced concern with limited mobility and inability to WB well through R LE. Noted pain medicine administered prior to arrival- daughter mentioned they give her pain medicine 'around the clock' at home. Patient may benefit from skilled PT services acutely to address functional deficits as well as improve level of independence prior to discharge. Anticipate and hopeful for patient to return home with family assistance upon DC.

## 2022-12-22 NOTE — PROGRESS NOTES
Dedicated to Hospital Care    [phone]   LOS: 1 day     Name: Helena Carmen  Age/Sex: 89 y.o. female  :  1931        PCP: Mariah Hickman MD  Chief Complaint   Patient presents with   • Ankle Pain   • Leg Pain   • Hip Pain   • Fall      Subjective   She is doing okay today other than feeling sore a little fatigued.  Denies other new issues or complaints today.  General: No Fever or Chills, Cardiac: No Chest Pain or Palpitations, Resp: No Cough or SOA, GI: No Nausea, Vomiting, or Diarrhea and Other: No bleeding    amLODIPine, 10 mg, Oral, Daily  carvedilol, 12.5 mg, Oral, BID  cholecalciferol, 1,000 Units, Oral, Daily  famotidine, 10 mg, Oral, BID  [START ON 2022] furosemide, 40 mg, Oral, Daily  gabapentin, 600 mg, Oral, BID  hydrALAZINE, 50 mg, Oral, BID  HYDROcodone-acetaminophen, 1 tablet, Oral, Q6H  insulin glargine, 20 Units, Subcutaneous, Nightly  insulin lispro, 0-9 Units, Subcutaneous, TID With Meals  levothyroxine, 50 mcg, Oral, Daily  lidocaine, 1 patch, Transdermal, Q24H  montelukast, 10 mg, Oral, Nightly  polyethylene glycol, 17 g, Oral, Daily  predniSONE, 10 mg, Oral, Daily With Breakfast  sennosides-docusate, 1 tablet, Oral, Daily  tamsulosin, 0.4 mg, Oral, Daily           Objective   Vital Signs  Temp:  [97.5 °F (36.4 °C)-98.1 °F (36.7 °C)] 98.1 °F (36.7 °C)  Heart Rate:  [64-72] 68  Resp:  [16-18] 18  BP: (142-149)/(51-82) 147/62  Body mass index is 35.66 kg/m².    Intake/Output Summary (Last 24 hours) at 2022 1622  Last data filed at 2022 1315  Gross per 24 hour   Intake 1020 ml   Output 1350 ml   Net -330 ml       Physical Exam  Vitals and nursing note reviewed.   Constitutional:       General: She is not in acute distress.     Appearance: Normal appearance. She is obese. She is ill-appearing.   Cardiovascular:      Rate and Rhythm: Normal rate and regular rhythm.   Pulmonary:      Effort: No respiratory distress.      Breath sounds: Normal breath sounds.    Abdominal:      General: Bowel sounds are normal.      Palpations: Abdomen is soft.   Neurological:      General: No focal deficit present.      Mental Status: She is alert and oriented to person, place, and time.           Results Review:       I reviewed the patient's new clinical results.  Results from last 7 days   Lab Units 12/21/22  0724 12/20/22  1207   WBC 10*3/mm3 6.92 8.96   HEMOGLOBIN g/dL 12.4 12.9   PLATELETS 10*3/mm3 66* 69*     Results from last 7 days   Lab Units 12/22/22  0700 12/21/22  0724 12/20/22  1350 12/20/22  1207   SODIUM mmol/L 140 141 136  --    POTASSIUM mmol/L 3.8 4.4 4.5  --    CHLORIDE mmol/L 100 100 99  --    CO2 mmol/L 29.8* 30.4* 26.8  --    BUN mg/dL 46* 35* 30*  --    CREATININE mg/dL 2.11* 1.92* 1.59*  --    CALCIUM mg/dL 8.8 8.9 8.7  --    MAGNESIUM mg/dL 1.8  --   --  1.9   PHOSPHORUS mg/dL 5.0*  --   --   --    Estimated Creatinine Clearance: 15.7 mL/min (A) (by C-G formula based on SCr of 2.11 mg/dL (H)).      Assessment & Plan   Active Hospital Problems    Diagnosis  POA   • **Acute diastolic congestive heart failure (HCC) [I50.31]  Yes   • Chronic ITP (idiopathic thrombocytopenia) (HCC) [D69.3]  Yes   • Other cirrhosis of liver (HCC) [K74.69]  Yes   • Type 2 diabetes mellitus with hyperglycemia (HCC) [E11.65]  Yes   • Gastroparesis [K31.84]  Yes   • Pulmonary hypertension (HCC) [I27.20]  Yes   • Paroxysmal atrial fibrillation (HCC) [I48.0]  Yes   • Essential hypertension [I10]  Yes   • Secondary hyperparathyroidism (HCC) [N25.81]  Yes   • Stage 4 chronic kidney disease (HCC) [N18.4]  Yes      Resolved Hospital Problems   No resolved problems to display.       PLAN  This is a 89-year-old female with a history of of type 2 diabetes hypertension paroxysmal A. fib chronic kidney disease secondary hyperparathyroidism chronic ITP and chronic diastolic heart failure who presents to the hospital with shortness of breath and weakness with back and hip pain and is found to have  acute on chronic diastolic heart failure and impaired mobility  -She does have a fracture of her toe.  Recommendations are for conservative management.  PT and OT are following her here in the hospital.  We will plan home health at discharge.  -Planning her transition to oral diuretics today.  -Still having a lot of other musculoskeletal pain in her back and her knee.  I think she probably has some musculoskeletal strains.  I suspect she is probably sprained her knee and strained the muscles in her back.  X-rays of all been negative.  Management would being pain control and physical therapy.  Plan discussed with family  -Overall I did discuss this patient's quality life and her recent history of multiple falls and impaired mobility.  Discussed with the patient and her daughter at the bedside.  While she is a DNR and they do understand her overall risk we did discuss where she sits on the spectrum of mortality over the next 12 months.  We discussed her clinical decline we discussed her medical comorbidities and discuss the issues with becoming less mobile.  They both admitted that she becomes easily fatigued and has trouble getting to and from the doctors.  She has missed doctors appointments just because she is too tired to go.  The plan is to enroll in extended care [facility] in order to better facilitate care at home.  I have reached out to cardiology and oncology today to see about using virtual visits for the next few appointments.  I have also recommended referral to [facility] for assistance with symptoms management on ongoing discussions regarding palliative care    Disposition  Plan potential discharge home with home health tomorrow      Eran Balderrama MD  [facility]  12/22/22  16:22 EST

## 2022-12-22 NOTE — PLAN OF CARE
Goal Outcome Evaluation:  Plan of Care Reviewed With: patient        Progress: no change  Outcome Evaluation:     A&O x4. Legally blind.     VSS. SR on telemetry. 2L via n/c while sleeping. Room air while awake.     Q2 turn.     External catheter in use.   Daily bladder scan.     Accu-checks.   Daily weights.     C/O chronic back pain and pain to the right foot r/t fx.   Scheduled Norco given with relief.       No signs of distress. Will continue to monitor

## 2022-12-22 NOTE — ACP (ADVANCE CARE PLANNING)
Continued Stay Note  [facility]     Patient Name: Helena Carmen  MRN: [id number]  Today's Date: 12/22/2022    Admit Date: 12/20/2022    Plan: Home with current HH   Discharge Plan     Row Name 12/22/22 1358       Plan    Plan Home with current HH    Patient/Family in Agreement with Plan yes    Plan Comments Per Dr. Balderrama request CCP spoke with Aparna at [facility], to establish new PCP appointment, gave all patient information, they will reach out to pt son Brandon to schedule for after discharge.   Called in referral for [facility], also faxed H&P and face sheet per their request to [phone]. CCP will follow - Ngoc GARAY               Discharge Codes    No documentation.               Expected Discharge Date and Time     Expected Discharge Date Expected Discharge Time    Dec 22, 2022             Ngoc Frost RN

## 2022-12-22 NOTE — PROGRESS NOTES
LOS: 1 day   Patient Care Team:  Mariah Hickman MD as PCP - General (Family Medicine)  Beth Butcher MD as Consulting Physician (Cardiology)  Rolando Wick MD as Consulting Physician (Nephrology)  Pablo Sherman Jr., MD as Consulting Physician (Hematology and Oncology)  Mango Arnold MD as Referring Physician (Internal Medicine)    Chief Complaint: Follow-up acute on chronic diastolic CHF, tissue AVR.    Interval History: Her main complaint today is back and leg discomfort.  These are mainly with mobility.  No chest pain.  Her shortness of breath has improved.    Vital Signs:  Temp:  [97.5 °F (36.4 °C)-98.1 °F (36.7 °C)] 98.1 °F (36.7 °C)  Heart Rate:  [64-72] 68  Resp:  [16-18] 18  BP: (142-149)/(51-82) 147/62    Intake/Output Summary (Last 24 hours) at 12/22/2022 1507  Last data filed at 12/22/2022 1315  Gross per 24 hour   Intake 1020 ml   Output 1350 ml   Net -330 ml       Physical Exam:   General Appearance:    No acute distress, alert and oriented x4   Lungs:     Clear to auscultation bilaterally     Heart:    Regular rhythm and normal rate. II/VI SM RUSB and LLSB.   Abdomen:     Soft, nontender, nondistended.    Extremities:   Trace edema of the lower extremities.     Results Review:    Results from last 7 days   Lab Units 12/22/22  0700   SODIUM mmol/L 140   POTASSIUM mmol/L 3.8   CHLORIDE mmol/L 100   CO2 mmol/L 29.8*   BUN mg/dL 46*   CREATININE mg/dL 2.11*   GLUCOSE mg/dL 206*   CALCIUM mg/dL 8.8     Results from last 7 days   Lab Units 12/20/22  2113 12/20/22  1350   TROPONIN T ng/mL 0.052* 0.051*     Results from last 7 days   Lab Units 12/21/22  0724   WBC 10*3/mm3 6.92   HEMOGLOBIN g/dL 12.4   HEMATOCRIT % 38.1   PLATELETS 10*3/mm3 66*             Results from last 7 days   Lab Units 12/22/22  0700   MAGNESIUM mg/dL 1.8           I reviewed the patient's new clinical results.        Assessment:  1.  Acute on chronic diastolic CHF  2.  Acute kidney injury with stage IIIb chronic  kidney disease  3.  Severe aortic stenosis, status post 21 mm tissue aortic valve replacement in 2013  4.  Recent fall on 12/7/2022 resulting in right first toe fracture  5.  Elevated troponin, likely secondary to #2  6.  Chronic ITP  7.  History of DVT  8.  Hypertension  9.  Diabetes    Plan:  -Responded well to diuresis.  Her renal function is slightly worse today, although I told the patient and her daughter that she may need to trade a small amount of renal function for better volume status.    -Dr. Vázquez is following from nephrology.  He switched her to oral diuretics today.  She is now on Lasix 40 mg/day.    -Blood pressure is reasonable on her current regimen.  Continue amlodipine, carvedilol, and hydralazine.  At 89 years of age, I would not push her blood pressure medications too hard.    -Working with PT given her recent foot injury and mobility issues with her back and legs.    Og Grimaldo MD  12/22/22  15:07 EST

## 2022-12-22 NOTE — CASE MANAGEMENT/SOCIAL WORK
Discharge Planning Assessment  [facility]     Patient Name: Helena Carmen  MRN: [id number]  Today's Date: 12/22/2022    Admit Date: 12/20/2022    Plan: Home with family   Discharge Needs Assessment     Row Name 12/22/22 1004       Living Environment    People in Home alone    Current Living Arrangements home    Primary Care Provided by self;child(edison);other (see comments)    Provides Primary Care For no one, unable/limited ability to care for self    Family Caregiver if Needed child(edison), adult;grandchild(edison), adult    Quality of Family Relationships involved;helpful    Able to Return to Prior Arrangements yes       Resource/Environmental Concerns    Resource/Environmental Concerns none       Transition Planning    Patient/Family Anticipates Transition to home with family;home with help/services    Patient/Family Anticipated Services at Transition home health care    Transportation Anticipated family or friend will provide       Discharge Needs Assessment    Readmission Within the Last 30 Days no previous admission in last 30 days    Equipment Currently Used at Home walker, rolling;rollator;shower chair;grab bar    Concerns to be Addressed adjustment to diagnosis/illness    Anticipated Changes Related to Illness none    Equipment Needed After Discharge none               Discharge Plan     Row Name 12/22/22 1005       Plan    Plan Home with family    Patient/Family in Agreement with Plan yes    Plan Comments Spoke with pt at bedside, introduced self and explained CCP role, and confirmed pharmacy and face sheet information. Pt lives alone in 1 level home with no steps to enter. Permission to speak to her children if needed, she states her granddaughter stays with her from 7am to 6pm daily, and her children rotate who stays at night. They assist with all ADL’s, she has rolling walker, s/c, and GB and a rollator she doesn’t use. She is current with [facility] (Annalise/Cindyteniraj notified), she has been to  [facility] before and another SNF she couldn’t recall. She plans home with family to transport with current HH, no anticipated needs at this time. CCP will follow - Ngoc GARAY              Continued Care and Services - Admitted Since 12/20/2022     Home Medical Care     Service Provider Request Status Selected Services Address Phone Fax Patient Preferred    [facility]  Selected Home Health Services [address], [address], [place] [id number] [phone] --            Selected Continued Care - Prior Encounters Includes continued care and service providers with selected services from prior encounters from 9/21/2022 to 12/22/2022    Discharged on 10/20/2022 Admission date: 10/16/2022 - Discharge disposition: Home-Health Care Svc    Home Medical Care     Service Provider Selected Services Address Phone Fax Patient Preferred    [facility] [address], [address], [place] [phone] --                    Expected Discharge Date and Time     Expected Discharge Date Expected Discharge Time    Dec 22, 2022          Demographic Summary     Row Name 12/22/22 1004       General Information    Admission Type inpatient               Functional Status     Row Name 12/22/22 1004       Functional Status    Usual Activity Tolerance moderate    Current Activity Tolerance fair       Assessment of Health Literacy    Health Literacy Moderate       Functional Status, IADL    Medications assistive person    Meal Preparation assistive person    Housekeeping assistive person    Laundry assistive person    Shopping assistive person       Mental Status    General Appearance WDL WDL       Mental Status Summary    Recent Changes in Mental Status/Cognitive Functioning no changes               Psychosocial    No documentation.                Abuse/Neglect    No documentation.                Legal     Row Name 12/22/22 1006        Financial/Legal    Who Manages Finances if Patient Unable Children               Substance Abuse    No documentation.                Patient Forms    No documentation.                   Ngoc Frost RN

## 2022-12-22 NOTE — PROGRESS NOTES
[facility] Progress Note      Patient Name: Helena Carmen  : 1931  MRN: [id number]  Primary Care Physician:  Mariah Hickman MD  Date of admission: 2022    Subjective     Interval History:   Breathing is comfortable as she lies flat on just 2 L/min  UOP 1.8 L yesterday  Appetite has improved; no N/V  Still has considerable pain right foot and right shin    Review of Systems:   As noted above    Objective     Vitals:   Temp:  [97.5 °F (36.4 °C)-98.2 °F (36.8 °C)] 98.1 °F (36.7 °C)  Heart Rate:  [64-72] 68  Resp:  [16-18] 18  BP: (136-149)/(51-82) 147/62  Flow (L/min):  [2] 2    Intake/Output Summary (Last 24 hours) at 2022 1429  Last data filed at 2022 1315  Gross per 24 hour   Intake 1020 ml   Output 1350 ml   Net -330 ml       Physical Exam:    Constitutional: Awake, alert, NAD  HEENT: Sclera anicteric, no conjunctival injection, MMM, legally blind  Neck: Supple, no carotid bruit, trachea at midline, no JVD  Respiratory: Diminished in bases, few crackles, nonlabored on 2 L/min  Cardiovascular: RRR, with ectopy; 2/6M, no rub  Gastrointestinal: BS +, soft, nontender and nondistended  : No palpable bladder  Musculoskeletal: Trace-+1 edema; bruised right foot  Psychiatric: Appropriate affect, cooperative, oriented  Neurologic: moving all extremities, normal speech and mental status  Skin: Warm and dry    Scheduled Meds:     amLODIPine, 10 mg, Oral, Daily  carvedilol, 12.5 mg, Oral, BID  cholecalciferol, 1,000 Units, Oral, Daily  famotidine, 10 mg, Oral, BID  furosemide, 40 mg, Intravenous, Q12H  gabapentin, 600 mg, Oral, BID  hydrALAZINE, 50 mg, Oral, BID  HYDROcodone-acetaminophen, 1 tablet, Oral, Q6H  insulin glargine, 20 Units, Subcutaneous, Nightly  insulin lispro, 0-9 Units, Subcutaneous, TID With Meals  levothyroxine, 50 mcg, Oral, Daily  lidocaine, 1 patch, Transdermal, Q24H  montelukast, 10 mg, Oral, Nightly  polyethylene glycol, 17 g, Oral,  Daily  predniSONE, 10 mg, Oral, Daily With Breakfast  sennosides-docusate, 1 tablet, Oral, Daily  tamsulosin, 0.4 mg, Oral, Daily      IV Meds:        Results Reviewed:   I have personally reviewed the results from the time of this admission to 12/22/2022 14:29 EST     Results from last 7 days   Lab Units 12/22/22  0700 12/21/22  0724 12/20/22  1350   SODIUM mmol/L 140 141 136   POTASSIUM mmol/L 3.8 4.4 4.5   CHLORIDE mmol/L 100 100 99   CO2 mmol/L 29.8* 30.4* 26.8   BUN mg/dL 46* 35* 30*   CREATININE mg/dL 2.11* 1.92* 1.59*   CALCIUM mg/dL 8.8 8.9 8.7   BILIRUBIN mg/dL  --   --  0.7   ALK PHOS U/L  --   --  72   ALT (SGPT) U/L  --   --  32   AST (SGOT) U/L  --   --  21   GLUCOSE mg/dL 206* 182* 325*       Estimated Creatinine Clearance: 15.7 mL/min (A) (by C-G formula based on SCr of 2.11 mg/dL (H)).    Results from last 7 days   Lab Units 12/22/22  0700 12/20/22  1207   MAGNESIUM mg/dL 1.8 1.9   PHOSPHORUS mg/dL 5.0*  --              Results from last 7 days   Lab Units 12/21/22  0724 12/20/22  1207   WBC 10*3/mm3 6.92 8.96   HEMOGLOBIN g/dL 12.4 12.9   PLATELETS 10*3/mm3 66* 69*             Assessment / Plan     ASSESSMENT:  1.  NADER on CKD3b, nonoliguric, with expected rise in SCR following diuresis; SCR approaching plateau.  Likely contraction alkalosis from diuretic and use of oral sodium bicarbonate; potassium is normal. Improving volume excess by imaging and exam.  Urinalysis bland with no blood and only trace protein  2.  CHF, improving with IV diuretic.  Chest x-ray 12/21 shows decreasing atelectasis/infiltrate vs prior day  3.  Recent fall 12/7/22 causing right first toe fracture  4.  Chronic ITP  5.  DM2 with hyperglycemia  6.  Elevated troponin  7.  Aortic valve stenosis s/p tissue AVR    PLAN:  1.  Transition to oral furosemide 40 mg daily (home dose had just been furosemide 20 mg daily)  2.  Surveillance labs    Thank you for involving us in the care of Helena Carmen.  Please feel free to call with any  questions.    Jordan Vázquez MD  12/22/22  14:29 EST    [facility]  [phone]    Please note that portions of this note were completed with a voice recognition program.

## 2022-12-22 NOTE — DISCHARGE PLACEMENT REQUEST
Helena Carmen (89 y.o. Female)     Date of Birth   02/20/1931    Social Security Number       Address   [address] [place] [address]    Home Phone   [phone]    MRN   [id number]       [place]   Sikhism    Marital Status                               Admission Date   12/20/22    Admission Type   Emergency    Admitting Provider   Mango Arnold MD    Attending Provider   Eran Balderrama MD    Department, Room/Bed   [address], [address]29/1       Discharge Date       Discharge Disposition       Discharge Destination                               Attending Provider: Eran Balderrama MD    Allergies: Erythromycin, Statins, Cephalexin, Penicillins, Sulfa Antibiotics    Isolation: None   Infection: None   Code Status: No CPR    Ht: 144.8 cm (57\")   Wt: 74.8 kg (164 lb 12.8 oz)    Admission Cmt: None   Principal Problem: Acute diastolic congestive heart failure (HCC) [I50.31]                 Active Insurance as of 12/20/2022     Primary Coverage     Payor Plan Insurance Group Employer/Plan Group    MEDICARE MEDICARE A & B      Payor Plan Address Payor Plan Phone Number Payor Plan Fax Number Effective Dates    [address] [phone]  2/1/1996 - None Entered    [facility] [id number]       Subscriber Name Subscriber Birth Date Member ID       HELENA CARMEN 2/20/1931 [id number]           Secondary Coverage     Payor Plan Insurance Group Employer/Plan Group     FOR LIFE  FOR LIFE  SUP       Payor Plan Address Payor Plan Phone Number Payor Plan Fax Number Effective Dates    [address] [phone]  4/5/2016 - None Entered    [facility] [id number]       Subscriber Name Subscriber Birth Date Member ID       HELENA CARMEN 2/20/1931 [id number]                 Emergency Contacts      (Rel.) Home Phone Work Phone Mobile Phone    Brandon carmen (Son) [phone] -- --    Radha Hoyos (Daughter) [phone] -- [phone]    Margaret Loco  (Daughter) [phone] -- [phone]    DEANNA GOTTIARAM (Grandchild) -- -- [phone]            Emergency Contact Information     Name Relation Home Work Mobile    Brandon carmen Son [phone]      Radha Hoyos Daughter [phone]    Margaret Loco Daughter [phone]    DEANNA GOTTIARAM Grandchild   [phone]             History & Physical      StinglIsdira MD at 22          HISTORY AND PHYSICAL   [facility]        Date of Admission: 2022  Patient Identification:  Name: Helena Carmen  Age: 89 y.o.  Sex: female  :  1931  MRN: [id number]                     Primary Care Physician: Mariah Hickman MD    Chief Complaint:  89 year old female who presented to the emergency room with shortness of breath with little exertion; she has also had pain of her back and right hip; she was hospitalized two months after a fall; she denies fever or chills; no cough or sick contacts    History of Present Illness:   As above    Past Medical History:  Past Medical History:   Diagnosis Date   • Aortic valve stenosis     s/p tissue AVR   • Back pain    • CKD (chronic kidney disease)    • Colitis due to Clostridioides difficile 2022   • Diastolic dysfunction     Grade 2 per echocardiogram    • Diverticulosis    • Exertional shortness of breath    • Heart disease    • Hiatal hernia    • Hyperlipidemia    • Hypertension    • Hyperthyroidism    • Hypertriglyceridemia 2018   • Hypothyroidism    • Left ventricular hypertrophy    • Legally blind    • Liver disease    • Macular degeneration    • Mitral regurgitation    • Osteoarthritis of hip    • Pancreatitis 2022   • Paroxysmal atrial fibrillation (HCC)    • Premature ventricular contractions    • Pulmonary hypertension (HCC)    • Renal insufficiency syndrome    • Type 2 diabetes mellitus (HCC)    • Uterine cancer (HCC)      Past Surgical History:  Past Surgical  History:   Procedure Laterality Date   • AORTIC VALVE REPAIR/REPLACEMENT     • CATARACT EXTRACTION      1970, 1999   • CHOLECYSTECTOMY     • ENDOSCOPY  08/15/2014    no gross lesions in stomach/duodenum, erythrematous mucosa in stomach   • HYSTERECTOMY  2007   • STERNOTOMY        Home Meds:  Medications Prior to Admission   Medication Sig Dispense Refill Last Dose   • amLODIPine (NORVASC) 10 MG tablet Take 1 tablet by mouth daily.   12/20/2022   • carvedilol (COREG) 12.5 MG tablet Take 1 tablet by mouth 2 (Two) Times a Day. 60 tablet 11 12/20/2022   • cetirizine (zyrTEC) 5 MG tablet Take 1 tablet by mouth Daily As Needed for Allergies.   12/20/2022   • cholecalciferol (VITAMIN D3) 25 MCG (1000 UT) tablet Take 1 tablet by mouth Daily.   12/20/2022   • famotidine (PEPCID) 10 MG tablet Take 10 mg by mouth 2 (Two) Times a Day.   12/20/2022   • furosemide (LASIX) 20 MG tablet Take 1 tablet by mouth Daily. 30 tablet 11 12/20/2022   • gabapentin (NEURONTIN) 400 MG capsule Take 600 mg by mouth 2 (Two) Times a Day.   12/20/2022   • hydrALAZINE (APRESOLINE) 25 MG tablet Take 3 tablets by mouth Every 8 (Eight) Hours. (Patient taking differently: Take 50 mg by mouth 2 (Two) Times a Day.)   12/20/2022   • HYDROcodone-acetaminophen (NORCO) 7.5-325 MG per tablet Take 1 tablet by mouth Every 6 (Six) Hours.   12/20/2022   • insulin glargine (LANTUS, SEMGLEE) 100 UNIT/ML injection Inject 34 Units under the skin into the appropriate area as directed Every Night. (Patient taking differently: Inject 20 Units under the skin into the appropriate area as directed Every Night.)  12 12/19/2022   • insulin lispro (humaLOG) 100 UNIT/ML injection Inject 0-10 Units under the skin into the appropriate area as directed 3 (Three) Times a Day Before Meals. 2 units for every 50 > 150   12/20/2022   • levothyroxine (SYNTHROID, LEVOTHROID) 25 MCG tablet Take 50 mcg by mouth Daily.   12/20/2022   • lidocaine (LIDODERM) 5 % Place 1 patch on the skin as  directed by provider Daily. Remove & Discard patch within 12 hours or as directed by MD 6 each 0 Past Week   • Loratadine (CLARITIN PO) Take  by mouth.   12/20/2022   • LORazepam (ATIVAN) 0.5 MG tablet Take 1 tablet by mouth Every 8 (Eight) Hours As Needed for Anxiety. 10 tablet 0 12/19/2022   • methocarbamol (ROBAXIN) 750 MG tablet Take 1 tablet by mouth Every 6 (Six) Hours As Needed for Muscle Spasms. (Patient taking differently: Take 750 mg by mouth Every 8 (Eight) Hours As Needed for Muscle Spasms.)   12/20/2022   • montelukast (SINGULAIR) 10 MG tablet Take 1 tablet by mouth Every Night.   12/20/2022   • polyethylene glycol (MIRALAX) 17 GM/SCOOP powder Take 17 g by mouth Daily.   12/20/2022   • predniSONE (DELTASONE) 5 MG tablet Take 3 tablets by mouth Daily With Breakfast. (Patient taking differently: Take 10 mg by mouth Daily With Breakfast.) 270 tablet 0 12/20/2022   • sennosides-docusate (PERICOLACE) 8.6-50 MG per tablet Take 1 tablet by mouth Daily.   12/20/2022   • sodium bicarbonate 650 MG tablet Take 1 tablet by mouth 2 (Two) Times a Day. 60 tablet 0 12/20/2022   • tamsulosin (FLOMAX) 0.4 MG capsule 24 hr capsule Take 0.4 mg by mouth every night at bedtime.   12/20/2022   • melatonin 1 MG tablet Take  by mouth.          Allergies:  Allergies   Allergen Reactions   • Erythromycin Unknown (See Comments) and Other (See Comments)     Pt states she does not remember but it was many years ago  Pt states she does not remember but it was many years ago   • Statins Myalgia   • Cephalexin Other (See Comments) and Unknown (See Comments)     June 2022 Pt has tolerated ceftriaxone and cefepime during admission.   Pt states she does not remember reaction but it was many years ago   • Penicillins Rash   • Sulfa Antibiotics Itching and Rash     Immunizations:  Immunization History   Administered Date(s) Administered   • COVID-19 (PFIZER) PURPLE CAP 07/15/2021, 08/05/2021   • Covid-19 (Pfizer) Gray Cap 08/29/2022   •  Fluad Quad 65+ 10/16/2020, 10/15/2021, 10/04/2022   • Fluzone High Dose =>65 Years ([employer] ONLY) 10/27/2014, 10/09/2015, 10/07/2016, 2017, 2018, 10/18/2019   • Pneumococcal Conjugate 13-Valent (PCV13) 2016   • Pneumococcal Polysaccharide (PPSV23) 2020     Social History:   Social History     Social History Narrative   • Not on file     Social History     Socioeconomic History   • Marital status:    Tobacco Use   • Smoking status: Former     Packs/day: 0.50     Types: Cigarettes     Quit date: 7/10/1969     Years since quittin.4   • Smokeless tobacco: Never   Vaping Use   • Vaping Use: Never used   Substance and Sexual Activity   • Alcohol use: No     Comment: caffeine use - coffee 2 cups daily   • Drug use: No   • Sexual activity: Defer       Family History:  Family History   Problem Relation Age of Onset   • Heart disease Mother    • Hypertension Mother    • Stroke Mother    • Diabetes Mother         mellitus   • Other Other         cardiovascular disorder        Review of Systems  See history of present illness and past medical history.  Patient denies headache, dizziness, syncope, falls, trauma, change in vision, change in hearing, change in taste, changes in weight, changes in appetite, focal weakness, numbness, or paresthesia.  Patient denies chest pain, palpitations sinus pressure, rhinorrhea, epistaxis, hemoptysis, nausea, vomiting,hematemesis, diarrhea, constipation or hematchezia.  Denies cold or heat intolerance, polydipsia, polyuria, polyphagia. Denies hematuria, pyuria, dysuria, hesitancy, frequency or urgency. Denies consumption of raw and under cooked meats foods or change in water source.  Denies fever, chills, sweats, night sweats.  Denies missing any routine medications. Remainder of ROS is negative.    Objective:  T Max 24 hrs: Temp (24hrs), Av.4 °F (36.9 °C), Min:97.7 °F (36.5 °C), Max:99.8 °F (37.7 °C)    Vitals Ranges:   Temp:  [97.7 °F (36.5 °C)-99.8 °F  (37.7 °C)] 97.7 °F (36.5 °C)  Heart Rate:  [64-82] 74  Resp:  [16-18] 18  BP: (149-184)/(57-80) 184/57      Exam:  BP (!) 184/57 (BP Location: Left arm, Patient Position: Lying) Comment: nurse notified  Pulse 74   Temp 97.7 °F (36.5 °C) (Oral)   Resp 18   Ht 144.8 cm (57\")   Wt 70.7 kg (155 lb 14.4 oz)   SpO2 92%   BMI 33.74 kg/m²     General Appearance:    Alert, cooperative, no distress, appears stated age   Head:    Normocephalic, without obvious abnormality, atraumatic   Eyes:    PERRL, conjunctivae/corneas clear, EOM's intact, both eyes   Ears:    Normal external ear canals, both ears   Nose:   Nares normal, septum midline, mucosa normal, no drainage    or sinus tenderness   Throat:   Lips, mucosa, and tongue normal   Neck:   Supple, symmetrical, trachea midline, no adenopathy;     thyroid:  no enlargement/tenderness/nodules; no carotid    bruit or JVD   Back:     Symmetric, no curvature, ROM normal, no CVA tenderness   Lungs:     Decreased breath sounds bilaterally, respirations unlabored   Chest Wall:    No tenderness or deformity    Heart:    Regular rate and rhythm, S1 and S2 normal, no murmur, rub   or gallop   Abdomen:     Soft, nontender, bowel sounds active all four quadrants,     no masses, no hepatomegaly, no splenomegaly   Extremities:   Extremities normal, atraumatic, no cyanosis or edema   Pulses:   2+ and symmetric all extremities   Skin:   Skin color, texture, turgor normal, no rashes or lesions    .    Data Review:  Labs in chart were reviewed.  WBC   Date Value Ref Range Status   12/20/2022 8.96 3.40 - 10.80 10*3/mm3 Final     Hemoglobin   Date Value Ref Range Status   12/20/2022 12.9 12.0 - 15.9 g/dL Final     Hematocrit   Date Value Ref Range Status   12/20/2022 39.3 34.0 - 46.6 % Final     Platelets   Date Value Ref Range Status   12/20/2022 69 (L) 140 - 450 10*3/mm3 Final     Sodium   Date Value Ref Range Status   12/20/2022 136 136 - 145 mmol/L Final     Potassium   Date Value Ref  Range Status   12/20/2022 4.5 3.5 - 5.2 mmol/L Final     Comment:     Slight hemolysis detected by analyzer. Results may be affected.     Chloride   Date Value Ref Range Status   12/20/2022 99 98 - 107 mmol/L Final     CO2   Date Value Ref Range Status   12/20/2022 26.8 22.0 - 29.0 mmol/L Final     BUN   Date Value Ref Range Status   12/20/2022 30 (H) 8 - 23 mg/dL Final     Creatinine   Date Value Ref Range Status   12/20/2022 1.59 (H) 0.57 - 1.00 mg/dL Final     Glucose   Date Value Ref Range Status   12/20/2022 325 (H) 65 - 99 mg/dL Final     Calcium   Date Value Ref Range Status   12/20/2022 8.7 8.2 - 9.6 mg/dL Final     Magnesium   Date Value Ref Range Status   12/20/2022 1.9 1.7 - 2.3 mg/dL Final     AST (SGOT)   Date Value Ref Range Status   12/20/2022 21 1 - 32 U/L Final     ALT (SGPT)   Date Value Ref Range Status   12/20/2022 32 1 - 33 U/L Final     Alkaline Phosphatase   Date Value Ref Range Status   12/20/2022 72 39 - 117 U/L Final                Imaging Results (All)     Procedure Component Value Units Date/Time    XR Foot 3+ View Right [[id number]] Collected: 12/20/22 1228     Updated: 12/20/22 1235    Narrative:      XR FOOT 3+ VW RIGHT-  12/20/2022     HISTORY: Fell on December 7. Foot pain and swelling.     There is a mildly displaced fracture involving the lateral aspect of the  base of the proximal phalanx of the right great toe. Bones are  demineralized. There are some minimal hallux valgus deformity of the  right first metatarsophalangeal joint.     No other fractures or dislocations are seen. Calcaneal spurs are seen.       Impression:      1. Fracture of the proximal phalanx of the right great toe.        This report was finalized on 12/20/2022 12:32 PM by Dr. Herbie Pike M.D.       XR Chest 1 View [[id number]] Collected: 12/20/22 1227     Updated: 12/20/22 1231    Narrative:      XR CHEST 1 VW-  12/20/2022     HISTORY: Shortness of breath.     Heart size is mildly enlarged. There are some  mild patchy increased  density in the right lung base and minimally in the left lung base. This  could represent some mild bibasilar atelectasis, atypical pulmonary  edema and/or developing pneumonia. Please correlate with the clinical  findings. Sternotomy wires are seen. No pneumothorax is seen.     This report was finalized on 12/20/2022 12:28 PM by Dr. Herbie Pike M.D.               Assessment:  Active Hospital Problems    Diagnosis  POA   • **Acute diastolic congestive heart failure (HCC) [I50.31]  Yes      Resolved Hospital Problems   No resolved problems to display.   hypertension  Hypothyroidism  Obesity   Back pain  ckd3  Diabetes  Chronic itp    Plan:  Rebel  Repeat troponin  Ask cardiology to see her  Monitor on telemetry  accu checks, sliding scale insulin  Trend platelets  D. w patient and ED Provider as well as nurse    Isidra Mancuso MD  12/20/2022  20:44 EST      Electronically signed by Isidra Mancuso MD at 12/20/22 2050

## 2022-12-22 NOTE — PROGRESS NOTES
Nutrition Services    Patient Name:  Helena Carmen  YOB: 1931  MRN: [id number]  Admit Date:  12/20/2022    Assessment Date:  12/22/22    NUTRITION SCREENING      Reason for Encounter Charted pressure injury    Diagnosis/Problem CHF, NADER on CKD III, Severe aortic stenosis, HTN, DM, hx of DVT       PO Diet Diet: Cardiac Diets, Diabetic Diets; Healthy Heart (2-3 Na+); Consistent Carbohydrate; Texture: Regular Texture (IDDSI 7); Fluid Consistency: Thin (IDDSI 0)   PO Supplements/Snacks n/a   PO Intake % 80% (2 days-5 meals)       Labs  Listed below, reviewed   Physical Findings Alert and oriented, obese   GI Function LBM: 12/22   Skin Status PI left gluteal (stage 2), skin tear and MASD       Height:  Weight:  BMI:    Weight Trend Height: 144.8 cm (57\")  Weight: 74.8 kg (164 lb 12.8 oz) (12/22/22 0502)  Body mass index is 35.66 kg/m².    Gain 10# in 2 months        Intervention/Plan 1. Monitor po intake, wt status and clinical course.          Results from last 7 days   Lab Units 12/22/22  0700 12/21/22  0724 12/20/22  1350   SODIUM mmol/L 140 141 136   POTASSIUM mmol/L 3.8 4.4 4.5   CHLORIDE mmol/L 100 100 99   CO2 mmol/L 29.8* 30.4* 26.8   BUN mg/dL 46* 35* 30*   CREATININE mg/dL 2.11* 1.92* 1.59*   CALCIUM mg/dL 8.8 8.9 8.7   BILIRUBIN mg/dL  --   --  0.7   ALK PHOS U/L  --   --  72   ALT (SGPT) U/L  --   --  32   AST (SGOT) U/L  --   --  21   GLUCOSE mg/dL 206* 182* 325*     Results from last 7 days   Lab Units 12/22/22  0700 12/21/22  0724 12/20/22  1207   MAGNESIUM mg/dL 1.8  --  1.9   PHOSPHORUS mg/dL 5.0*  --   --    HEMOGLOBIN g/dL  --  12.4 12.9   HEMATOCRIT %  --  38.1 39.3     COVID19   Date Value Ref Range Status   12/20/2022 Not Detected Not Detected - Ref. Range Final     Lab Results   Component Value Date    HGBA1C 6.30 (H) 12/21/2022       RD to follow up per protocol.    Electronically signed by:  Ange Lepe RD  12/22/22 15:50 EST

## 2022-12-22 NOTE — THERAPY EVALUATION
Patient Name: Helena Carmen  : 1931    MRN: [id number]                              Today's Date: 2022       Admit Date: 2022    Visit Dx:     ICD-10-CM ICD-9-CM   1. Acute diastolic congestive heart failure (HCC)  I50.31 428.31     428.0   2. Acute pulmonary edema (HCC)  J81.0 518.4   3. Closed displaced fracture of proximal phalanx of right great toe, initial encounter  S92.411A 826.0     Patient Active Problem List   Diagnosis   • Aortic valve stenosis   • Fatigue   • MI (mitral incompetence)   • PVC (premature ventricular contraction)   • Legally blind   • Essential hypertension   • Hypertriglyceridemia   • Pulmonary hypertension (HCC)   • Paroxysmal atrial fibrillation (HCC)   • Anxiety   • Stage 4 chronic kidney disease (HCC)   • Chronic pain disorder   • Degeneration of lumbar intervertebral disc   • Type 2 diabetes mellitus with hyperglycemia (HCC)   • Esophageal reflux   • Gastroparesis   • Hiatal hernia   • Iatrogenic hypothyroidism   • Macular degeneration   • Malignant neoplasm of uterus (HCC)   • Osteoarthritis of knee   • Peripheral nerve disease   • Secondary hyperparathyroidism (HCC)   • Thrombocytopenia (HCC)   • Chronic heart failure with preserved ejection fraction (HCC)   • Other cirrhosis of liver (HCC)   • Hypothyroidism   • Nonrheumatic tricuspid valve regurgitation   • Anemia, chronic disease   • Closed fracture of fifth lumbar vertebra (HCC)   • Closed fracture of fifth lumbar vertebra, unspecified fracture morphology, initial encounter (HCC)   • Diabetic polyneuropathy associated with type 2 diabetes mellitus (HCC)   • Chronic ITP (idiopathic thrombocytopenia) (HCC)   • Chronic midline low back pain with bilateral sciatica   • Acute deep vein thrombosis of calf, bilateral (HCC)   • Acute diastolic congestive heart failure (HCC)     Past Medical History:   Diagnosis Date   • Aortic valve stenosis     s/p tissue AVR   • Back pain    • CKD (chronic kidney disease)    •  Colitis due to Clostridioides difficile 01/26/2022   • Diastolic dysfunction     Grade 2 per echocardiogram 2013   • Diverticulosis    • Exertional shortness of breath    • Heart disease    • Hiatal hernia    • Hyperlipidemia    • Hypertension    • Hyperthyroidism    • Hypertriglyceridemia 05/31/2018   • Hypothyroidism    • Left ventricular hypertrophy    • Legally blind    • Liver disease    • Macular degeneration    • Mitral regurgitation    • Osteoarthritis of hip    • Pancreatitis 01/26/2022   • Paroxysmal atrial fibrillation (HCC)    • Premature ventricular contractions    • Pulmonary hypertension (HCC)    • Renal insufficiency syndrome    • Type 2 diabetes mellitus (HCC)    • Uterine cancer (HCC)      Past Surgical History:   Procedure Laterality Date   • AORTIC VALVE REPAIR/REPLACEMENT     • CATARACT EXTRACTION      1970, 1999   • CHOLECYSTECTOMY     • ENDOSCOPY  08/15/2014    no gross lesions in stomach/duodenum, erythrematous mucosa in stomach   • HYSTERECTOMY  2007   • STERNOTOMY        General Information     Row Name 12/22/22 1045          Physical Therapy Time and Intention    Document Type evaluation  -MS     Mode of Treatment physical therapy  -MS     Row Name 12/22/22 1045          General Information    Patient Profile Reviewed yes  -MS     Prior Level of Function all household mobility;min assist:;ADL's  24/7 care provided by family per daughter report, primarily home bound, ambulates with a RW, 1 fall in last month  -MS     Existing Precautions/Restrictions fall  -MS     Barriers to Rehab medically complex;previous functional deficit  -MS     Row Name 12/22/22 1045          Living Environment    People in Home other (see comments)  family with her 24/7  -MS     Row Name 12/22/22 1045          Cognition    Orientation Status (Cognition) oriented x 4  -MS     Row Name 12/22/22 1045          Safety Issues, Functional Mobility    Impairments Affecting Function (Mobility) strength;endurance/activity  tolerance;balance;pain;range of motion (ROM)  -MS     Comment, Safety Issues/Impairments (Mobility) Gait belt and non skid socks donned.  -MS           User Key  (r) = Recorded By, (t) = Taken By, (c) = Cosigned By    Initials Name Provider Type    Radha French, PT Physical Therapist               Mobility     Row Name 12/22/22 1048          Bed Mobility    Bed Mobility supine-sit;sit-supine  -MS     Supine-Sit [place] (Bed Mobility) standby assist;verbal cues;nonverbal cues (demo/gesture)  -MS     Sit-Supine [place] (Bed Mobility) minimum assist (75% patient effort);verbal cues;nonverbal cues (demo/gesture)  -MS     Comment, (Bed Mobility) SV for sitting balance, ~5 min  -MS     Row Name 12/22/22 1048          Transfers    Comment, (Transfers) Sequencing cues- reported pain in R knee and hip, difficulty WBing  -MS     Row Name 12/22/22 1048          Sit-Stand Transfer    Sit-Stand [place] (Transfers) contact guard;verbal cues;nonverbal cues (demo/gesture)  -MS     Assistive Device (Sit-Stand Transfers) walker, front-wheeled  -MS     Row Name 12/22/22 1048          Gait/Stairs (Locomotion)    [place] Level (Gait) contact guard;verbal cues;nonverbal cues (demo/gesture)  -MS     Assistive Device (Gait) walker, front-wheeled  -MS     Distance in Feet (Gait) 2 side steps towards HOB  -MS     Deviations/Abnormal Patterns (Gait) paul decreased;gait speed decreased  -MS     Bilateral Gait Deviations forward flexed posture  -MS     Comment, (Gait/Stairs) Pain limiting.  -MS           User Key  (r) = Recorded By, (t) = Taken By, (c) = Cosigned By    Initials Name Provider Type    Ailyn Frenchelias LEVINE, PT Physical Therapist               Obj/Interventions     Row Name 12/22/22 1049          Range of Motion Comprehensive    Comment, General Range of Motion B LEs grossly WFL  -MS     Row Name 12/22/22 1049          Strength Comprehensive (MMT)    Comment, General Manual Muscle Testing (MMT)  Assessment B LEs grossly at least 3/5  -MS     Row Name 12/22/22 1049          Balance    Balance Assessment sitting static balance;standing static balance  -MS     Static Sitting Balance supervision  -MS     Position, Sitting Balance sitting edge of bed  -MS     Static Standing Balance contact guard  -MS     Position/Device Used, Standing Balance supported;walker, front-wheeled  -MS     Row Name 12/22/22 1049          Sensory Assessment (Somatosensory)    Sensory Assessment (Somatosensory) sensation intact  -MS           User Key  (r) = Recorded By, (t) = Taken By, (c) = Cosigned By    Initials Name Provider Type    MS SusanRadha, PT Physical Therapist               Goals/Plan     Row Name 12/22/22 1051          Bed Mobility Goal 1 (PT)    Activity/Assistive Device (Bed Mobility Goal 1, PT) bed mobility activities, all  -MS     [place] Level/Cues Needed (Bed Mobility Goal 1, PT) supervision required  -MS     Time Frame (Bed Mobility Goal 1, PT) 1 week  -MS     Row Name 12/22/22 1051          Transfer Goal 1 (PT)    Activity/Assistive Device (Transfer Goal 1, PT) transfers, all;walker, rolling  -MS     [place] Level/Cues Needed (Transfer Goal 1, PT) supervision required  -MS     Time Frame (Transfer Goal 1, PT) 1 week  -MS     Row Name 12/22/22 1051          Gait Training Goal 1 (PT)    Activity/Assistive Device (Gait Training Goal 1, PT) gait (walking locomotion);walker, rolling  -MS     [place] Level (Gait Training Goal 1, PT) contact guard required  -MS     Distance (Gait Training Goal 1, PT) 50  -MS     Time Frame (Gait Training Goal 1, PT) 1 week  -MS     Row Name 12/22/22 1051          Therapy Assessment/Plan (PT)    Planned Therapy Interventions (PT) balance training;bed mobility training;gait training;home exercise program;patient/family education;postural re-education;stair training;strengthening;transfer training  -MS           User Key  (r) = Recorded By, (t) = Taken By, (c) =  Cosigned By    Initials Name Provider Type    MS SalazarsRadha, PT Physical Therapist               Clinical Impression     Row Name 12/22/22 1049          Pain    Pretreatment Pain Rating 8/10  -MS     Posttreatment Pain Rating 8/10  -MS     Pain Location - Side/Orientation Right  -MS     Pain Location lower  -MS     Pain Location - extremity  -MS     Pain Intervention(s) Repositioned;Ambulation/increased activity;Rest  -MS     Row Name 12/22/22 1049          Therapy Assessment/Plan (PT)    Rehab Potential (PT) fair, will monitor progress closely  -MS     Criteria for Skilled Interventions Met (PT) yes;meets criteria  -MS     Therapy Frequency (PT) 3 times/wk  -MS     Row Name 12/22/22 1049          Vital Signs    O2 Delivery Pre Treatment supplemental O2  -MS     Intra SpO2 (%) 99  -MS     O2 Delivery Intra Treatment room air  -MS     Post SpO2 (%) 98  -MS     O2 Delivery Post Treatment room air  -MS     Row Name 12/22/22 1049          Positioning and Restraints    Pre-Treatment Position in bed  -MS     Post Treatment Position bed  -MS     In Bed fowlers;call light within reach;encouraged to call for assist;exit alarm on;RLE elevated  -MS           User Key  (r) = Recorded By, (t) = Taken By, (c) = Cosigned By    Initials Name Provider Type    MS DavisRadha, PT Physical Therapist               Outcome Measures     Row Name 12/22/22 1051          How much help from another person do you currently need...    Turning from your back to your side while in flat bed without using bedrails? 4  -MS     Moving from lying on back to sitting on the side of a flat bed without bedrails? 3  -MS     Moving to and from a bed to a chair (including a wheelchair)? 3  -MS     Standing up from a chair using your arms (e.g., wheelchair, bedside chair)? 3  -MS     Climbing 3-5 steps with a railing? 1  -MS     To walk in hospital room? 1  -MS     AM-PAC 6 Clicks Score (PT) 15  -MS     Highest level of mobility 4 -->  Transferred to chair/commode  -MS     Row Name 12/22/22 1051          Functional Assessment    Outcome Measure Options AM-PAC 6 Clicks Basic Mobility (PT)  -MS           User Key  (r) = Recorded By, (t) = Taken By, (c) = Cosigned By    Initials Name Provider Type    MS DavisRadha PT Physical Therapist                             Physical Therapy Education     Title: PT OT SLP Therapies (In Progress)     Topic: Physical Therapy (In Progress)     Point: Mobility training (Done)     Learning Progress Summary           Patient Acceptance, E,TB, VU by MS at 12/22/2022 1052   Family Acceptance, E,TB, VU by MS at 12/22/2022 1052                   Point: Home exercise program (Not Started)     Learner Progress:  Not documented in this visit.          Point: Body mechanics (Done)     Learning Progress Summary           Patient Acceptance, E,TB, VU by MS at 12/22/2022 1052   Family Acceptance, E,TB, VU by MS at 12/22/2022 1052                   Point: Precautions (Not Started)     Learner Progress:  Not documented in this visit.                      User Key     Initials Effective Dates Name Provider Type Discipline    MS 06/16/21 -  Radha Davis PT Physical Therapist PT              PT Recommendation and Plan  Planned Therapy Interventions (PT): balance training, bed mobility training, gait training, home exercise program, patient/family education, postural re-education, stair training, strengthening, transfer training        Time Calculation:    PT Charges     Row Name 12/22/22 1045             Time Calculation    Start Time 1026  -MS      Stop Time 1042  -MS      Time Calculation (min) 16 min  -MS      PT Received On 12/22/22  -MS      PT - Next Appointment 12/23/22  -MS      PT Goal Re-Cert Due Date 12/29/22  -MS         Time Calculation- PT    Total Timed Code Minutes- PT 12 minute(s)  -MS            User Key  (r) = Recorded By, (t) = Taken By, (c) = Cosigned By    Initials Name Provider Type    MS  Radha Davis, PT Physical Therapist              Therapy Charges for Today     Code Description Service Date Service Provider Modifiers Qty    97822368578 HC PT EVAL MOD COMPLEXITY 3 12/22/2022 Radha Davis, PT GP 1    96908299758 HC PT THER PROC EA 15 MIN 12/22/2022 Radha Davis, PT GP 1          PT G-Codes  Outcome Measure Options: AM-PAC 6 Clicks Basic Mobility (PT)  AM-PAC 6 Clicks Score (PT): 15  PT Discharge Summary  Anticipated Discharge Disposition (PT): home with home health, home with 24/7 care    Radha Davis, PT  12/22/2022

## 2022-12-22 NOTE — PLAN OF CARE
Problem: Adult Inpatient Plan of Care  Goal: Plan of Care Review  Flowsheets (Taken 12/22/2022 1037)  Outcome Evaluation: AOx4. Between room air and 2 L nasal cannula throughout the day. NSR with PACs. IV Lasix, next dose to be switched to PO. Q2 turns encouraged. BMx2. Call light within reach, bed alarm on. Patient tried SCDs, requested they be off at the moment, reeducated on their importance and will try to encourage again.   Goal Outcome Evaluation:              Outcome Evaluation: AOx4. Between room air and 2 L nasal cannula throughout the day. NSR with PACs. IV Lasix, next dose to be switched to PO. Q2 turns encouraged. BMx2. Call light within reach, bed alarm on. Patient tried SCDs, requested they be off at the moment, reeducated on their importance and will try to encourage again.

## 2022-12-23 ENCOUNTER — APPOINTMENT (OUTPATIENT)
Dept: GENERAL RADIOLOGY | Facility: HOSPITAL | Age: 87
DRG: 291 | End: 2022-12-23
Payer: MEDICARE

## 2022-12-23 LAB
ALBUMIN SERPL-MCNC: 3.4 G/DL (ref 3.5–5.2)
ANION GAP SERPL CALCULATED.3IONS-SCNC: 8 MMOL/L (ref 5–15)
BASOPHILS # BLD AUTO: 0.02 10*3/MM3 (ref 0–0.2)
BASOPHILS NFR BLD AUTO: 0.3 % (ref 0–1.5)
BUN SERPL-MCNC: 47 MG/DL (ref 8–23)
BUN/CREAT SERPL: 23.7 (ref 7–25)
CALCIUM SPEC-SCNC: 8.7 MG/DL (ref 8.2–9.6)
CHLORIDE SERPL-SCNC: 101 MMOL/L (ref 98–107)
CO2 SERPL-SCNC: 30 MMOL/L (ref 22–29)
CREAT SERPL-MCNC: 1.98 MG/DL (ref 0.57–1)
DEPRECATED RDW RBC AUTO: 48.3 FL (ref 37–54)
EGFRCR SERPLBLD CKD-EPI 2021: 23.5 ML/MIN/1.73
EOSINOPHIL # BLD AUTO: 0.1 10*3/MM3 (ref 0–0.4)
EOSINOPHIL NFR BLD AUTO: 1.6 % (ref 0.3–6.2)
ERYTHROCYTE [DISTWIDTH] IN BLOOD BY AUTOMATED COUNT: 14.9 % (ref 12.3–15.4)
GLUCOSE BLDC GLUCOMTR-MCNC: 165 MG/DL (ref 70–130)
GLUCOSE BLDC GLUCOMTR-MCNC: 194 MG/DL (ref 70–130)
GLUCOSE BLDC GLUCOMTR-MCNC: 207 MG/DL (ref 70–130)
GLUCOSE BLDC GLUCOMTR-MCNC: 390 MG/DL (ref 70–130)
GLUCOSE SERPL-MCNC: 175 MG/DL (ref 65–99)
HCT VFR BLD AUTO: 36.3 % (ref 34–46.6)
HGB BLD-MCNC: 11.7 G/DL (ref 12–15.9)
IMM GRANULOCYTES # BLD AUTO: 0.02 10*3/MM3 (ref 0–0.05)
IMM GRANULOCYTES NFR BLD AUTO: 0.3 % (ref 0–0.5)
LYMPHOCYTES # BLD AUTO: 1.86 10*3/MM3 (ref 0.7–3.1)
LYMPHOCYTES NFR BLD AUTO: 29.7 % (ref 19.6–45.3)
MAGNESIUM SERPL-MCNC: 1.9 MG/DL (ref 1.7–2.3)
MCH RBC QN AUTO: 28 PG (ref 26.6–33)
MCHC RBC AUTO-ENTMCNC: 32.2 G/DL (ref 31.5–35.7)
MCV RBC AUTO: 86.8 FL (ref 79–97)
MONOCYTES # BLD AUTO: 0.58 10*3/MM3 (ref 0.1–0.9)
MONOCYTES NFR BLD AUTO: 9.3 % (ref 5–12)
NEUTROPHILS NFR BLD AUTO: 3.69 10*3/MM3 (ref 1.7–7)
NEUTROPHILS NFR BLD AUTO: 58.8 % (ref 42.7–76)
NRBC BLD AUTO-RTO: 0 /100 WBC (ref 0–0.2)
PHOSPHATE SERPL-MCNC: 4.4 MG/DL (ref 2.5–4.5)
PLATELET # BLD AUTO: 55 10*3/MM3 (ref 140–450)
PMV BLD AUTO: 12.5 FL (ref 6–12)
POTASSIUM SERPL-SCNC: 4.1 MMOL/L (ref 3.5–5.2)
RBC # BLD AUTO: 4.18 10*6/MM3 (ref 3.77–5.28)
SODIUM SERPL-SCNC: 139 MMOL/L (ref 136–145)
WBC NRBC COR # BLD: 6.27 10*3/MM3 (ref 3.4–10.8)

## 2022-12-23 PROCEDURE — 99232 SBSQ HOSP IP/OBS MODERATE 35: CPT | Performed by: NURSE PRACTITIONER

## 2022-12-23 PROCEDURE — 63710000001 INSULIN LISPRO (HUMAN) PER 5 UNITS: Performed by: HOSPITALIST

## 2022-12-23 PROCEDURE — 80069 RENAL FUNCTION PANEL: CPT | Performed by: INTERNAL MEDICINE

## 2022-12-23 PROCEDURE — 82962 GLUCOSE BLOOD TEST: CPT

## 2022-12-23 PROCEDURE — 85025 COMPLETE CBC W/AUTO DIFF WBC: CPT | Performed by: HOSPITALIST

## 2022-12-23 PROCEDURE — 25010000002 ONDANSETRON PER 1 MG: Performed by: INTERNAL MEDICINE

## 2022-12-23 PROCEDURE — 97530 THERAPEUTIC ACTIVITIES: CPT

## 2022-12-23 PROCEDURE — 63710000001 PREDNISONE PER 5 MG: Performed by: INTERNAL MEDICINE

## 2022-12-23 PROCEDURE — 63710000001 INSULIN LISPRO (HUMAN) PER 5 UNITS: Performed by: NURSE PRACTITIONER

## 2022-12-23 PROCEDURE — 83735 ASSAY OF MAGNESIUM: CPT | Performed by: INTERNAL MEDICINE

## 2022-12-23 PROCEDURE — 74220 X-RAY XM ESOPHAGUS 1CNTRST: CPT

## 2022-12-23 RX ORDER — PANTOPRAZOLE SODIUM 40 MG/1
40 TABLET, DELAYED RELEASE ORAL
Status: DISCONTINUED | OUTPATIENT
Start: 2022-12-23 | End: 2022-12-25 | Stop reason: HOSPADM

## 2022-12-23 RX ORDER — INSULIN LISPRO 100 [IU]/ML
10 INJECTION, SOLUTION INTRAVENOUS; SUBCUTANEOUS
Status: DISCONTINUED | OUTPATIENT
Start: 2022-12-23 | End: 2022-12-25 | Stop reason: HOSPADM

## 2022-12-23 RX ADMIN — FUROSEMIDE 40 MG: 40 TABLET ORAL at 13:49

## 2022-12-23 RX ADMIN — HYDROCODONE BITARTRATE AND ACETAMINOPHEN 1 TABLET: 7.5; 325 TABLET ORAL at 05:28

## 2022-12-23 RX ADMIN — INSULIN LISPRO 4 UNITS: 100 INJECTION, SOLUTION INTRAVENOUS; SUBCUTANEOUS at 18:20

## 2022-12-23 RX ADMIN — INSULIN LISPRO 2 UNITS: 100 INJECTION, SOLUTION INTRAVENOUS; SUBCUTANEOUS at 09:07

## 2022-12-23 RX ADMIN — TAMSULOSIN HYDROCHLORIDE 0.4 MG: 0.4 CAPSULE ORAL at 13:49

## 2022-12-23 RX ADMIN — ONDANSETRON 4 MG: 2 INJECTION INTRAMUSCULAR; INTRAVENOUS at 09:29

## 2022-12-23 RX ADMIN — ONDANSETRON 4 MG: 2 INJECTION INTRAMUSCULAR; INTRAVENOUS at 19:50

## 2022-12-23 RX ADMIN — PANTOPRAZOLE SODIUM 40 MG: 40 TABLET, DELAYED RELEASE ORAL at 13:50

## 2022-12-23 RX ADMIN — INSULIN LISPRO 10 UNITS: 100 INJECTION, SOLUTION INTRAVENOUS; SUBCUTANEOUS at 18:23

## 2022-12-23 RX ADMIN — ACETAMINOPHEN 650 MG: 325 TABLET, FILM COATED ORAL at 18:21

## 2022-12-23 RX ADMIN — HYDRALAZINE HYDROCHLORIDE 50 MG: 50 TABLET, FILM COATED ORAL at 13:49

## 2022-12-23 RX ADMIN — BARIUM SULFATE 135 ML: 980 POWDER, FOR SUSPENSION ORAL at 11:30

## 2022-12-23 RX ADMIN — HYDRALAZINE HYDROCHLORIDE 50 MG: 50 TABLET, FILM COATED ORAL at 21:25

## 2022-12-23 RX ADMIN — PREDNISONE 10 MG: 10 TABLET ORAL at 13:49

## 2022-12-23 RX ADMIN — MONTELUKAST SODIUM 10 MG: 10 TABLET, FILM COATED ORAL at 21:25

## 2022-12-23 RX ADMIN — HYDROCODONE BITARTRATE AND ACETAMINOPHEN 1 TABLET: 7.5; 325 TABLET ORAL at 13:49

## 2022-12-23 RX ADMIN — LIDOCAINE 1 PATCH: 50 PATCH TOPICAL at 21:26

## 2022-12-23 RX ADMIN — INSULIN LISPRO 2 UNITS: 100 INJECTION, SOLUTION INTRAVENOUS; SUBCUTANEOUS at 14:09

## 2022-12-23 RX ADMIN — HYDROCODONE BITARTRATE AND ACETAMINOPHEN 1 TABLET: 7.5; 325 TABLET ORAL at 21:25

## 2022-12-23 RX ADMIN — INSULIN LISPRO 8 UNITS: 100 INJECTION, SOLUTION INTRAVENOUS; SUBCUTANEOUS at 21:25

## 2022-12-23 RX ADMIN — CARVEDILOL 12.5 MG: 12.5 TABLET, FILM COATED ORAL at 13:49

## 2022-12-23 RX ADMIN — INSULIN GLARGINE-YFGN 20 UNITS: 100 INJECTION, SOLUTION SUBCUTANEOUS at 21:25

## 2022-12-23 RX ADMIN — DOCUSATE SODIUM 50MG AND SENNOSIDES 8.6MG 1 TABLET: 8.6; 5 TABLET, FILM COATED ORAL at 13:49

## 2022-12-23 RX ADMIN — CARVEDILOL 12.5 MG: 12.5 TABLET, FILM COATED ORAL at 21:24

## 2022-12-23 RX ADMIN — AMLODIPINE BESYLATE 10 MG: 10 TABLET ORAL at 13:49

## 2022-12-23 RX ADMIN — GABAPENTIN 600 MG: 300 CAPSULE ORAL at 21:25

## 2022-12-23 NOTE — PROGRESS NOTES
[facility] Progress Note      Patient Name: Helena Carmen  : 1931  MRN: [id number]  Primary Care Physician:  Mariah Hickman MD  Date of admission: 2022    Subjective     Interval History:   Has had difficulty swallowing food today; esophagram done today  Reports that food gets stuck and she must \"spit it out\"   But also describes some nausea during these episodes  No shortness of breath on 2 L/min lying nearly flat in bed  Reports significant right foot and right leg pain when standing earlier    Review of Systems:   As noted above    Objective     Vitals:   Temp:  [97.5 °F (36.4 °C)-98.3 °F (36.8 °C)] 98.3 °F (36.8 °C)  Heart Rate:  [57-64] 62  Resp:  [18] 18  BP: (137-168)/(59-67) 168/64  Flow (L/min):  [2] 2    Intake/Output Summary (Last 24 hours) at 2022 1656  Last data filed at 2022 0900  Gross per 24 hour   Intake 600 ml   Output 500 ml   Net 100 ml       Physical Exam:    Constitutional: Awake, alert, NAD lying flat  HEENT: Sclera anicteric, no conjunctival injection, MMM, legally blind  Neck: Supple, no carotid bruit, trachea at midline, no JVD  Respiratory: Diminished in bases, few crackles, nonlabored on 2 L/min  Cardiovascular: RRR, with ectopy; 2/6M, no rub  Gastrointestinal: BS +, soft, nontender and nondistended  : No palpable bladder  Musculoskeletal: Trace edema; bruised right foot  Psychiatric: Appropriate affect, cooperative, oriented  Neurologic: moving all extremities, normal speech and mental status  Skin: Warm and dry    Scheduled Meds:     amLODIPine, 10 mg, Oral, Daily  carvedilol, 12.5 mg, Oral, BID  cholecalciferol, 1,000 Units, Oral, Daily  furosemide, 40 mg, Oral, Daily  gabapentin, 600 mg, Oral, BID  hydrALAZINE, 50 mg, Oral, BID  HYDROcodone-acetaminophen, 1 tablet, Oral, Q6H  insulin glargine, 20 Units, Subcutaneous, Nightly  insulin lispro, 0-9 Units, Subcutaneous, 4x Daily With Meals & Nightly  insulin lispro, 10  Units, Subcutaneous, TID With Meals  levothyroxine, 50 mcg, Oral, Daily  lidocaine, 1 patch, Transdermal, Q24H  montelukast, 10 mg, Oral, Nightly  pantoprazole, 40 mg, Oral, Q AM  polyethylene glycol, 17 g, Oral, Daily  predniSONE, 10 mg, Oral, Daily With Breakfast  sennosides-docusate, 1 tablet, Oral, Daily  tamsulosin, 0.4 mg, Oral, Daily      IV Meds:        Results Reviewed:   I have personally reviewed the results from the time of this admission to 12/23/2022 16:56 EST     Results from last 7 days   Lab Units 12/23/22  0703 12/22/22  0700 12/21/22  0724 12/20/22  1350   SODIUM mmol/L 139 140 141 136   POTASSIUM mmol/L 4.1 3.8 4.4 4.5   CHLORIDE mmol/L 101 100 100 99   CO2 mmol/L 30.0* 29.8* 30.4* 26.8   BUN mg/dL 47* 46* 35* 30*   CREATININE mg/dL 1.98* 2.11* 1.92* 1.59*   CALCIUM mg/dL 8.7 8.8 8.9 8.7   BILIRUBIN mg/dL  --   --   --  0.7   ALK PHOS U/L  --   --   --  72   ALT (SGPT) U/L  --   --   --  32   AST (SGOT) U/L  --   --   --  21   GLUCOSE mg/dL 175* 206* 182* 325*       Estimated Creatinine Clearance: 16.4 mL/min (A) (by C-G formula based on SCr of 1.98 mg/dL (H)).    Results from last 7 days   Lab Units 12/23/22  0703 12/22/22  0700 12/20/22  1207   MAGNESIUM mg/dL 1.9 1.8 1.9   PHOSPHORUS mg/dL 4.4 5.0*  --              Results from last 7 days   Lab Units 12/23/22  0703 12/21/22  0724 12/20/22  1207   WBC 10*3/mm3 6.27 6.92 8.96   HEMOGLOBIN g/dL 11.7* 12.4 12.9   PLATELETS 10*3/mm3 55* 66* 69*             Assessment / Plan     ASSESSMENT:  1.  NADER on CKD3b, nonoliguric, with SCR approaching plateau.  Likely contraction alkalosis from diuretic and previous use of oral sodium bicarbonate; potassium is normal. Improving volume excess by exam. Urinalysis bland with no blood and only trace protein  2.  CHF, stabilizing. Chest x-ray 12/21 showed decreasing atelectasis/infiltrate vs prior day  3.  Recent fall 12/7/22 causing right first toe fracture  4.  Chronic ITP  5.  DM2 with hyperglycemia  6.   Elevated troponin  7.  Aortic valve stenosis s/p tissue AVR    PLAN:  1.  Continue oral furosemide 40 mg daily (home dose had just been furosemide 20 mg daily)  2.  Surveillance labs  3.  Hopefully to rehab soon    Thank you for involving us in the care of Helena Carmen.  Please feel free to call with any questions.    Jordan Vázquez MD  12/23/22  16:56 [facility]    [facility]  [phone]    Please note that portions of this note were completed with a voice recognition program.

## 2022-12-23 NOTE — TELEPHONE ENCOUNTER
Caller: Radha Hoyos    Relationship: Emergency Contact    Best call back number: [phone]    What is the best time to reach you: ANYTIME    Who are you requesting to speak with (clinical staff, provider,  specific staff member): CLINICAL       What was the call regarding: JAQUELIN GRUBER WAS ADMITTED INTO THE HOSPITAL ON Wednesday 12/21, HER DAUGHTER CALLED WANTING TO SEE IF HER TEST RESULTS HAVE CAME IN THAT WAS RAN AT THE HOSPITAL ( [facility] ) HE WAS CHECKING HER PLATELETS.      Do you require a callback: YES        
[] : Fellow

## 2022-12-23 NOTE — PLAN OF CARE
Goal Outcome Evaluation:  Plan of Care Reviewed With: patient        Progress: improving  Outcome Evaluation: Pt seen by Pt this PM for treatment. Pt very pleasant and cooperative throughout session. Pt sat up to EOB req SBA. Pt stood w/ CGA and use of fww. Pt then amb to sink and back to EOB w/ CGA although req min A as she grew fatigued. Pt unsteady w/ no LOB. Pt c/o significant back and R LE pain. Pt performed BLE ther ex for strengthening then returned to bed w/ min A. PT will prog as pt moiz.    Patient was not wearing a face mask during this therapy encounter. Therapist used appropriate personal protective equipment including mask and gloves.  Mask used was standard procedure mask. Appropriate PPE was worn during the entire therapy session. Hand hygiene was completed before and after therapy session. Patient is not in enhanced droplet precautions.  PT tech: Yvonne NAVAS

## 2022-12-23 NOTE — PLAN OF CARE
Goal Outcome Evaluation:              Outcome Evaluation: Patient LASHON for esophagram. Will continue to follow. On diet.

## 2022-12-23 NOTE — THERAPY TREATMENT NOTE
Patient Name: Helena Carmen  : 1931    MRN: [id number]                              Today's Date: 2022       Admit Date: 2022    Visit Dx:     ICD-10-CM ICD-9-CM   1. Acute diastolic congestive heart failure (HCC)  I50.31 428.31     428.0   2. Acute pulmonary edema (HCC)  J81.0 518.4   3. Closed displaced fracture of proximal phalanx of right great toe, initial encounter  S92.411A 826.0     Patient Active Problem List   Diagnosis   • Aortic valve stenosis   • Fatigue   • MI (mitral incompetence)   • PVC (premature ventricular contraction)   • Legally blind   • Essential hypertension   • Hypertriglyceridemia   • Pulmonary hypertension (HCC)   • Paroxysmal atrial fibrillation (HCC)   • Anxiety   • Stage 4 chronic kidney disease (HCC)   • Chronic pain disorder   • Degeneration of lumbar intervertebral disc   • Type 2 diabetes mellitus with hyperglycemia (HCC)   • Esophageal reflux   • Gastroparesis   • Hiatal hernia   • Iatrogenic hypothyroidism   • Macular degeneration   • Malignant neoplasm of uterus (HCC)   • Osteoarthritis of knee   • Peripheral nerve disease   • Secondary hyperparathyroidism (HCC)   • Thrombocytopenia (HCC)   • Chronic heart failure with preserved ejection fraction (HCC)   • Other cirrhosis of liver (HCC)   • Hypothyroidism   • Nonrheumatic tricuspid valve regurgitation   • Anemia, chronic disease   • Closed fracture of fifth lumbar vertebra (HCC)   • Closed fracture of fifth lumbar vertebra, unspecified fracture morphology, initial encounter (HCC)   • Diabetic polyneuropathy associated with type 2 diabetes mellitus (HCC)   • Chronic ITP (idiopathic thrombocytopenia) (HCC)   • Chronic midline low back pain with bilateral sciatica   • Acute deep vein thrombosis of calf, bilateral (HCC)   • Acute diastolic congestive heart failure (HCC)     Past Medical History:   Diagnosis Date   • Aortic valve stenosis     s/p tissue AVR   • Back pain    • CKD (chronic kidney disease)    •  Colitis due to Clostridioides difficile 01/26/2022   • Diastolic dysfunction     Grade 2 per echocardiogram 2013   • Diverticulosis    • Exertional shortness of breath    • Heart disease    • Hiatal hernia    • Hyperlipidemia    • Hypertension    • Hyperthyroidism    • Hypertriglyceridemia 05/31/2018   • Hypothyroidism    • Left ventricular hypertrophy    • Legally blind    • Liver disease    • Macular degeneration    • Mitral regurgitation    • Osteoarthritis of hip    • Pancreatitis 01/26/2022   • Paroxysmal atrial fibrillation (HCC)    • Premature ventricular contractions    • Pulmonary hypertension (HCC)    • Renal insufficiency syndrome    • Type 2 diabetes mellitus (HCC)    • Uterine cancer (HCC)      Past Surgical History:   Procedure Laterality Date   • AORTIC VALVE REPAIR/REPLACEMENT     • CATARACT EXTRACTION      1970, 1999   • CHOLECYSTECTOMY     • ENDOSCOPY  08/15/2014    no gross lesions in stomach/duodenum, erythrematous mucosa in stomach   • HYSTERECTOMY  2007   • STERNOTOMY        General Information     Row Name 12/23/22 1545          Physical Therapy Time and Intention    Document Type therapy note (daily note)  -     Mode of Treatment physical therapy  -     Row Name 12/23/22 1545          General Information    Existing Precautions/Restrictions fall  -     Row Name 12/23/22 1545          Safety Issues, Functional Mobility    Impairments Affecting Function (Mobility) balance;endurance/activity tolerance;strength;range of motion (ROM);pain;postural/trunk control  -     Comment, Safety Issues/Impairments (Mobility) gt belt and non skid socks donned  -           User Key  (r) = Recorded By, (t) = Taken By, (c) = Cosigned By    Initials Name Provider Type     Nia Vega PTA Physical Therapist Assistant               Mobility     Row Name 12/23/22 1546          Bed Mobility    Bed Mobility supine-sit  -     Supine-Sit [place] (Bed Mobility) standby assist;verbal  cues;nonverbal cues (demo/gesture)  -PH     Sit-Supine [place] (Bed Mobility) minimum assist (75% patient effort);verbal cues  -PH     Comment, (Bed Mobility) pt blind and req cues for orientation of bed when sitting up  -PH     Row Name 12/23/22 1546          Sit-Stand Transfer    Sit-Stand [place] (Transfers) verbal cues;minimum assist (75% patient effort);2 person assist;contact guard  -PH     Assistive Device (Sit-Stand Transfers) walker, front-wheeled  -PH     Row Name 12/23/22 1546          Gait/Stairs (Locomotion)    [place] Level (Gait) contact guard;verbal cues;minimum assist (75% patient effort);1 person assist;2 person assist  -PH     Assistive Device (Gait) walker, front-wheeled  -PH     Distance in Feet (Gait) to sink and back to EOB then a few steps to HOB after 2 min rest  -PH     Deviations/Abnormal Patterns (Gait) paul decreased;festinating/shuffling;gait speed decreased;stride length decreased  -PH     Bilateral Gait Deviations forward flexed posture;heel strike decreased  -PH     [place] Level (Stairs) not tested  -PH     Comment, (Gait/Stairs) pain limiting. many cues for postural correction w/ pt fwd flexed at hips about 90 degrees. Incr unsteadiness w/ distance - no LOB. Limited by pain and fatigue.  -PH           User Key  (r) = Recorded By, (t) = Taken By, (c) = Cosigned By    Initials Name Provider Type    PH Nia Vega PTA Physical Therapist Assistant               Obj/Interventions     Row Name 12/23/22 1549          Motor Skills    Therapeutic Exercise other (see comments)  LAQ, seated march; x 10 reps all  -PH     Row Name 12/23/22 1549          Balance    Balance Assessment sitting static balance;standing static balance  -PH     Static Sitting Balance supervision;contact guard  -PH     Static Standing Balance contact guard  -PH     Position/Device Used, Standing Balance walker, front-wheeled;supported  -PH     Comment, Balance fatigues easily during  standing and gait w/ incr unsteadiness; very poor fwd posture  -PH           User Key  (r) = Recorded By, (t) = Taken By, (c) = Cosigned By    Initials Name Provider Type    PH Nia Vega PTA Physical Therapist Assistant               Goals/Plan    No documentation.                Clinical Impression     Row Name 12/23/22 1549          Pain    Pretreatment Pain Rating 8/10  -PH     Posttreatment Pain Rating 9/10  -PH     Pre/Posttreatment Pain Comment fx back and R LE pain  -PH     Pain Intervention(s) Repositioned;Rest  -PH     Additional Documentation Pain Scale: Numbers Pre/Post-Treatment (Group)  -PH     Row Name 12/23/22 6911          Plan of Care Review    Plan of Care Reviewed With patient  -PH     Progress improving  -PH     Outcome Evaluation Pt seen by Pt this PM for treatment. Pt very pleasant and cooperative throughout session. Pt sat up to EOB req SBA. Pt stood w/ CGA and use of fww. Pt then amb to sink and back to EOB w/ CGA although req min A as she grew fatigued. Pt unsteady w/ no LOB. Pt c/o significant back and R LE pain. Pt performed BLE ther ex for strengthening then returned to bed w/ min A. PT will prog as pt moiz.  -PH           User Key  (r) = Recorded By, (t) = Taken By, (c) = Cosigned By    Initials Name Provider Type    PH Nia Vega PTA Physical Therapist Assistant               Outcome Measures     Row Name 12/23/22 2598          How much help from another person do you currently need...    Turning from your back to your side while in flat bed without using bedrails? 4  -PH     Moving from lying on back to sitting on the side of a flat bed without bedrails? 3  -PH     Moving to and from a bed to a chair (including a wheelchair)? 3  -PH     Standing up from a chair using your arms (e.g., wheelchair, bedside chair)? 3  -PH     Climbing 3-5 steps with a railing? 2  -PH     To walk in hospital room? 2  -PH     AM-PAC 6 Clicks Score (PT) 17  -PH     Highest level of  mobility 5 --> Static standing  -     Row Name 12/23/22 1552          Functional Assessment    Outcome Measure Options AM-PAC 6 Clicks Basic Mobility (PT)  -           User Key  (r) = Recorded By, (t) = Taken By, (c) = Cosigned By    Initials Name Provider Type     Nia Vega PTA Physical Therapist Assistant                             Physical Therapy Education     Title: PT OT SLP Therapies (Done)     Topic: Physical Therapy (Done)     Point: Mobility training (Done)     Learning Progress Summary           Patient Acceptance, E,D, VU,NR by  at 12/23/2022 1552    Acceptance, E,TB, VU by MS at 12/22/2022 1052   Family Acceptance, E,TB, VU by MS at 12/22/2022 1052                   Point: Home exercise program (Done)     Learning Progress Summary           Patient Acceptance, E,D, VU,NR by  at 12/23/2022 1552                   Point: Body mechanics (Done)     Learning Progress Summary           Patient Acceptance, E,D, VU,NR by  at 12/23/2022 1552    Acceptance, E,TB, VU by MS at 12/22/2022 1052   Family Acceptance, E,TB, VU by MS at 12/22/2022 1052                   Point: Precautions (Done)     Learning Progress Summary           Patient Acceptance, E,D, VU,NR by  at 12/23/2022 1552                               User Key     Initials Effective Dates Name Provider Type Discipline    MS 06/16/21 -  Radha Davis PT Physical Therapist PT     06/16/21 -  Nia Vega PTA Physical Therapist Assistant PT              PT Recommendation and Plan     Plan of Care Reviewed With: patient  Progress: improving  Outcome Evaluation: Pt seen by Pt this PM for treatment. Pt very pleasant and cooperative throughout session. Pt sat up to EOB req SBA. Pt stood w/ CGA and use of fww. Pt then amb to sink and back to EOB w/ CGA although req min A as she grew fatigued. Pt unsteady w/ no LOB. Pt c/o significant back and R LE pain. Pt performed BLE ther ex for strengthening then returned to bed  w/ min A. PT will prog as pt moiz.     Time Calculation:    PT Charges     Row Name 12/23/22 1553             Time Calculation    Start Time 1510  -PH      Stop Time 1523  -PH      Time Calculation (min) 13 min  -PH      PT Received On 12/23/22  -PH      PT - Next Appointment 12/24/22  -PH         Timed Charges    48615 - PT Therapeutic Exercise Minutes 3  -PH      05438 - PT Therapeutic Activity Minutes 10  -PH         Total Minutes    Timed Charges Total Minutes 13  -PH       Total Minutes 13  -PH            User Key  (r) = Recorded By, (t) = Taken By, (c) = Cosigned By    Initials Name Provider Type    PH Nia Vega PTA Physical Therapist Assistant              Therapy Charges for Today     Code Description Service Date Service Provider Modifiers Qty    51851937105 HC PT THER SUPP EA 15 MIN 12/23/2022 Nia Vega PTA GP 1    61874026966 HC PT THERAPEUTIC ACT EA 15 MIN 12/23/2022 Nia Vega PTA GP 1          PT G-Codes  Outcome Measure Options: AM-PAC 6 Clicks Basic Mobility (PT)  AM-PAC 6 Clicks Score (PT): 17  PT Discharge Summary  Anticipated Discharge Disposition (PT): home with 24/7 care, home with home health    Nia Vega PTA  12/23/2022

## 2022-12-23 NOTE — PROGRESS NOTES
[facility]    [phone]   LOS: 2 days     Name: Helena Carmen  Age/Sex: 89 y.o. female  :  1931        PCP: Mariah Hickman MD  Chief Complaint   Patient presents with   • Ankle Pain   • Leg Pain   • Hip Pain   • Fall      Subjective   This morning she had an episode of choking or reflux and not entirely sure what happened.  At any rate she is having some trouble keeping things down presently.  She says this happens periodically but is unsure what foods may exacerbate it.  She denies any pain with it.  General: No Fever or Chills, Cardiac: No Chest Pain or Palpitations, Resp: No Cough or SOA, GI: No Nausea, Vomiting, or Diarrhea and Other: No bleeding    amLODIPine, 10 mg, Oral, Daily  carvedilol, 12.5 mg, Oral, BID  cholecalciferol, 1,000 Units, Oral, Daily  furosemide, 40 mg, Oral, Daily  gabapentin, 600 mg, Oral, BID  hydrALAZINE, 50 mg, Oral, BID  HYDROcodone-acetaminophen, 1 tablet, Oral, Q6H  insulin glargine, 20 Units, Subcutaneous, Nightly  insulin lispro, 0-9 Units, Subcutaneous, 4x Daily With Meals & Nightly  levothyroxine, 50 mcg, Oral, Daily  lidocaine, 1 patch, Transdermal, Q24H  montelukast, 10 mg, Oral, Nightly  pantoprazole, 40 mg, Oral, Q AM  polyethylene glycol, 17 g, Oral, Daily  predniSONE, 10 mg, Oral, Daily With Breakfast  sennosides-docusate, 1 tablet, Oral, Daily  tamsulosin, 0.4 mg, Oral, Daily           Objective   Vital Signs  Temp:  [97.5 °F (36.4 °C)-98.1 °F (36.7 °C)] 98.1 °F (36.7 °C)  Heart Rate:  [57-68] 63  Resp:  [18] 18  BP: (137-159)/(59-67) 137/59  Body mass index is 34.54 kg/m².    Intake/Output Summary (Last 24 hours) at 2022 1314  Last data filed at 2022 0900  Gross per 24 hour   Intake 960 ml   Output 500 ml   Net 460 ml       Physical Exam  Vitals and nursing note reviewed.   Constitutional:       General: She is not in acute distress.     Appearance: Normal appearance. She is obese. She is ill-appearing.   Cardiovascular:       Rate and Rhythm: Normal rate and regular rhythm.   Pulmonary:      Effort: No respiratory distress.      Breath sounds: Normal breath sounds.   Abdominal:      General: Bowel sounds are normal.      Palpations: Abdomen is soft.   Neurological:      General: No focal deficit present.      Mental Status: She is alert and oriented to person, place, and time.           Results Review:       I reviewed the patient's new clinical results.  Results from last 7 days   Lab Units 12/23/22  0703 12/21/22  0724 12/20/22  1207   WBC 10*3/mm3 6.27 6.92 8.96   HEMOGLOBIN g/dL 11.7* 12.4 12.9   PLATELETS 10*3/mm3 55* 66* 69*     Results from last 7 days   Lab Units 12/23/22  0703 12/22/22  0700 12/21/22  0724 12/20/22  1350 12/20/22  1207   SODIUM mmol/L 139 140 141 136  --    POTASSIUM mmol/L 4.1 3.8 4.4 4.5  --    CHLORIDE mmol/L 101 100 100 99  --    CO2 mmol/L 30.0* 29.8* 30.4* 26.8  --    BUN mg/dL 47* 46* 35* 30*  --    CREATININE mg/dL 1.98* 2.11* 1.92* 1.59*  --    CALCIUM mg/dL 8.7 8.8 8.9 8.7  --    MAGNESIUM mg/dL 1.9 1.8  --   --  1.9   PHOSPHORUS mg/dL 4.4 5.0*  --   --   --    Estimated Creatinine Clearance: 16.4 mL/min (A) (by C-G formula based on SCr of 1.98 mg/dL (H)).  Lab Results   Component Value Date    HGBA1C 6.30 (H) 12/21/2022    HGBA1C 5.40 10/17/2022    HGBA1C 7.20 (H) 08/25/2022     Glucose   Date/Time Value Ref Range Status   12/23/2022 0556 194 (H) 70 - 130 mg/dL Final     Comment:     Meter: [id number] : [id number] Patricia García ELIZABETH   12/22/2022 2044 411 (C) 70 - 130 mg/dL Final     Comment:     Confirmed by Repeat Repeat Test RN Notified R and V Meter: [id number] :    12/22/2022 2042 408 (C) 70 - 130 mg/dL Final     Comment:     Repeat Test RN Notified R and V Meter: [id number] : [id number] Patricia García    12/22/2022 1647 368 (H) 70 - 130 mg/dL Final     Comment:     Meter: [id number] : [id number] Gerhard JOHNSON   12/22/2022 1201 189 (H) 70 - 130 mg/dL Final     Comment:     Meter:  [id number] : [id number] Gerhard Harmon NA   12/22/2022 0552 226 (H) 70 - 130 mg/dL Final     Comment:     Meter: [id number] : [id number] David Hearn NA   12/21/2022 1916 275 (H) 70 - 130 mg/dL Final     Comment:     Meter: [id number] : [id number] David Hearn NA   12/21/2022 1659 202 (H) 70 - 130 mg/dL Final     Comment:     Meter: [id number] : [id number] [facility] NA         Assessment & Plan   Active Hospital Problems    Diagnosis  POA   • **Acute diastolic congestive heart failure (HCC) [I50.31]  Yes   • Chronic ITP (idiopathic thrombocytopenia) (HCC) [D69.3]  Yes   • Other cirrhosis of liver (HCC) [K74.69]  Yes   • Type 2 diabetes mellitus with hyperglycemia (HCC) [E11.65]  Yes   • Gastroparesis [K31.84]  Yes   • Pulmonary hypertension (HCC) [I27.20]  Yes   • Paroxysmal atrial fibrillation (HCC) [I48.0]  Yes   • Essential hypertension [I10]  Yes   • Secondary hyperparathyroidism (HCC) [N25.81]  Yes   • Stage 4 chronic kidney disease (HCC) [N18.4]  Yes      Resolved Hospital Problems   No resolved problems to display.       PLAN  This is a 89-year-old female with a history of of type 2 diabetes hypertension paroxysmal A. fib chronic kidney disease secondary hyperparathyroidism chronic ITP and chronic diastolic heart failure who presents to the hospital with shortness of breath and weakness with back and hip pain and is found to have acute on chronic diastolic heart failure and impaired mobility  -She does have a fracture of her toe.  Recommendations are for conservative management.  PT and OT are following her here in the hospital.  We will plan home health at discharge.  -Continue oral diuretics for the time being.  -Still having a lot of other musculoskeletal pain in her back and her knee.  I think she probably has some musculoskeletal strains.  I suspect she is probably sprained her knee and strained the muscles in her back.  X-rays of all been negative.  Management would being pain control and  physical therapy.  Plan discussed with family  -Referrals were made to [facility] and extended care housecalls.  Discussed with oncology and cardiology and both are on board with virtual visits if needed.  -Blood sugars running pretty high at times last evening he had some 400 blood sugars.  We will add mealtime insulin today  -I am concerned about this aspiration and regurgitation issue.  We will check a esophagram and consult speech therapy.  If anything it probably sounds more like esophageal dysphagia but certainly cannot rule out oropharyngeal issues.  -Mechanical DVT prophylaxis  -DNR    Disposition  Plan potential discharge home with home health tomorrow      Eran Balderrama MD  [facility]  12/23/22  13:14 EST

## 2022-12-23 NOTE — PROGRESS NOTES
Patient Name: Helena Carmen  :1931  89 y.o.      Patient Care Team:  Mariah Hickman MD as PCP - General (Family Medicine)  Beth Butcher MD as Consulting Physician (Cardiology)  Rolando Wick MD as Consulting Physician (Nephrology)  Pablo Sherman Jr., MD as Consulting Physician (Hematology and Oncology)  Mango Arnold MD as Referring Physician (Internal Medicine)    Chief Complaint: follow up diastolic heart failure, bioprosthetic aortic valve    Interval History: significant reflux after a few bites of breakfast this morning. Doesn't feel well today.        Objective   Vital Signs  Temp:  [97.5 °F (36.4 °C)-98.1 °F (36.7 °C)] 98.1 °F (36.7 °C)  Heart Rate:  [57-68] 63  Resp:  [18] 18  BP: (137-159)/(59-67) 137/59    Intake/Output Summary (Last 24 hours) at 2022 1157  Last data filed at 2022 0900  Gross per 24 hour   Intake 960 ml   Output 500 ml   Net 460 ml     Flowsheet Rows    Flowsheet Row First Filed Value   Admission Height 144.8 cm (57\") Documented at 2022 1059   Admission Weight 75.8 kg (167 lb) Documented at 2022 1059          Physical Exam:   General Appearance:    Alert, cooperative, in no acute distress   Lungs:     Clear to auscultation.  Normal respiratory effort and rate.      Heart:    Regular rhythm and normal rate, normal S1 and S2, no murmurs, gallops or rubs.     Chest Wall:    No abnormalities observed   Abdomen:     Soft, nontender, positive bowel sounds.     Extremities:   no cyanosis, clubbing or edema.  No marked joint deformities.  Adequate musculoskeletal strength.       Results Review:    Results from last 7 days   Lab Units 22  0703   SODIUM mmol/L 139   POTASSIUM mmol/L 4.1   CHLORIDE mmol/L 101   CO2 mmol/L 30.0*   BUN mg/dL 47*   CREATININE mg/dL 1.98*   GLUCOSE mg/dL 175*   CALCIUM mg/dL 8.7     Results from last 7 days   Lab Units 22  2113 22  1350   TROPONIN T ng/mL 0.052* 0.051*     Results from last 7  days   Lab Units 12/23/22  0703   WBC 10*3/mm3 6.27   HEMOGLOBIN g/dL 11.7*   HEMATOCRIT % 36.3   PLATELETS 10*3/mm3 55*         Results from last 7 days   Lab Units 12/23/22  0703   MAGNESIUM mg/dL 1.9                   Medication Review:   amLODIPine, 10 mg, Oral, Daily  carvedilol, 12.5 mg, Oral, BID  cholecalciferol, 1,000 Units, Oral, Daily  furosemide, 40 mg, Oral, Daily  gabapentin, 600 mg, Oral, BID  hydrALAZINE, 50 mg, Oral, BID  HYDROcodone-acetaminophen, 1 tablet, Oral, Q6H  insulin glargine, 20 Units, Subcutaneous, Nightly  insulin lispro, 0-9 Units, Subcutaneous, 4x Daily With Meals & Nightly  levothyroxine, 50 mcg, Oral, Daily  lidocaine, 1 patch, Transdermal, Q24H  montelukast, 10 mg, Oral, Nightly  pantoprazole, 40 mg, Oral, Q AM  polyethylene glycol, 17 g, Oral, Daily  predniSONE, 10 mg, Oral, Daily With Breakfast  sennosides-docusate, 1 tablet, Oral, Daily  tamsulosin, 0.4 mg, Oral, Daily              Assessment & Plan       1. Acute on chronic diastolic heart failure - responded nicely to diuresis. Now on oral diuretics.   2. Chronic kidney disease - nephrology as above.   3. History of DVT - completed course of apixaban.   4. Questionable history of atrial fibrillation - family denies this. Follow on tele. [facility]  -- multiple falls recently. I don't think she is a candidate for long term OAC. Will defer monitor for now as I don't think it would changes management.   5. NSVT, continue beta blocker. Electrolytes stable. She has preserved LVEF  (last evaluated July 2022)  6.  severe aortic stenosis status post aortic valve replacement with 21 mm  [place] trifecta bovine pericardial valve in June 2013  7. Hypertension - BP above goal. Follow trends and adjust medications as indicated. Will observe for now.  8. Obesity BMI 35  9. Fall with right foot injury    Not really much to add from cardiac standpoint. She is having increasing trouble making it to doctors  appointments. Referral to [facility] noted as well as extended care house calls.     We can be available for virtual visits as needed.     Will sign off.     RIGO Poole  [facility]  12/23/22  11:57 EST

## 2022-12-24 PROBLEM — R13.19 ESOPHAGEAL DYSPHAGIA: Status: ACTIVE | Noted: 2022-12-24

## 2022-12-24 LAB
ALBUMIN SERPL-MCNC: 3.3 G/DL (ref 3.5–5.2)
ANION GAP SERPL CALCULATED.3IONS-SCNC: 9 MMOL/L (ref 5–15)
BUN SERPL-MCNC: 49 MG/DL (ref 8–23)
BUN/CREAT SERPL: 26.1 (ref 7–25)
CALCIUM SPEC-SCNC: 8.7 MG/DL (ref 8.2–9.6)
CHLORIDE SERPL-SCNC: 101 MMOL/L (ref 98–107)
CO2 SERPL-SCNC: 28 MMOL/L (ref 22–29)
CREAT SERPL-MCNC: 1.88 MG/DL (ref 0.57–1)
EGFRCR SERPLBLD CKD-EPI 2021: 25 ML/MIN/1.73
GLUCOSE BLDC GLUCOMTR-MCNC: 132 MG/DL (ref 70–130)
GLUCOSE BLDC GLUCOMTR-MCNC: 139 MG/DL (ref 70–130)
GLUCOSE BLDC GLUCOMTR-MCNC: 244 MG/DL (ref 70–130)
GLUCOSE BLDC GLUCOMTR-MCNC: 333 MG/DL (ref 70–130)
GLUCOSE SERPL-MCNC: 175 MG/DL (ref 65–99)
MAGNESIUM SERPL-MCNC: 1.9 MG/DL (ref 1.7–2.3)
PHOSPHATE SERPL-MCNC: 4.1 MG/DL (ref 2.5–4.5)
POTASSIUM SERPL-SCNC: 4.1 MMOL/L (ref 3.5–5.2)
SODIUM SERPL-SCNC: 138 MMOL/L (ref 136–145)

## 2022-12-24 PROCEDURE — 92610 EVALUATE SWALLOWING FUNCTION: CPT

## 2022-12-24 PROCEDURE — 83735 ASSAY OF MAGNESIUM: CPT | Performed by: INTERNAL MEDICINE

## 2022-12-24 PROCEDURE — 63710000001 INSULIN LISPRO (HUMAN) PER 5 UNITS: Performed by: NURSE PRACTITIONER

## 2022-12-24 PROCEDURE — 63710000001 PREDNISONE PER 5 MG: Performed by: INTERNAL MEDICINE

## 2022-12-24 PROCEDURE — 80069 RENAL FUNCTION PANEL: CPT | Performed by: INTERNAL MEDICINE

## 2022-12-24 PROCEDURE — 99222 1ST HOSP IP/OBS MODERATE 55: CPT | Performed by: INTERNAL MEDICINE

## 2022-12-24 PROCEDURE — 82962 GLUCOSE BLOOD TEST: CPT

## 2022-12-24 PROCEDURE — 63710000001 INSULIN LISPRO (HUMAN) PER 5 UNITS: Performed by: HOSPITALIST

## 2022-12-24 RX ADMIN — TAMSULOSIN HYDROCHLORIDE 0.4 MG: 0.4 CAPSULE ORAL at 09:27

## 2022-12-24 RX ADMIN — HYDROCODONE BITARTRATE AND ACETAMINOPHEN 1 TABLET: 7.5; 325 TABLET ORAL at 16:17

## 2022-12-24 RX ADMIN — HYDRALAZINE HYDROCHLORIDE 50 MG: 50 TABLET, FILM COATED ORAL at 21:14

## 2022-12-24 RX ADMIN — FUROSEMIDE 40 MG: 40 TABLET ORAL at 09:27

## 2022-12-24 RX ADMIN — HYDROCODONE BITARTRATE AND ACETAMINOPHEN 1 TABLET: 7.5; 325 TABLET ORAL at 21:14

## 2022-12-24 RX ADMIN — HYDROCODONE BITARTRATE AND ACETAMINOPHEN 1 TABLET: 7.5; 325 TABLET ORAL at 06:11

## 2022-12-24 RX ADMIN — HYDRALAZINE HYDROCHLORIDE 50 MG: 50 TABLET, FILM COATED ORAL at 09:27

## 2022-12-24 RX ADMIN — HYDROCODONE BITARTRATE AND ACETAMINOPHEN 1 TABLET: 7.5; 325 TABLET ORAL at 09:27

## 2022-12-24 RX ADMIN — GABAPENTIN 600 MG: 300 CAPSULE ORAL at 21:14

## 2022-12-24 RX ADMIN — MONTELUKAST SODIUM 10 MG: 10 TABLET, FILM COATED ORAL at 21:14

## 2022-12-24 RX ADMIN — INSULIN LISPRO 10 UNITS: 100 INJECTION, SOLUTION INTRAVENOUS; SUBCUTANEOUS at 09:27

## 2022-12-24 RX ADMIN — INSULIN GLARGINE-YFGN 20 UNITS: 100 INJECTION, SOLUTION SUBCUTANEOUS at 21:14

## 2022-12-24 RX ADMIN — Medication 1000 UNITS: at 09:27

## 2022-12-24 RX ADMIN — INSULIN LISPRO 10 UNITS: 100 INJECTION, SOLUTION INTRAVENOUS; SUBCUTANEOUS at 16:18

## 2022-12-24 RX ADMIN — AMLODIPINE BESYLATE 10 MG: 10 TABLET ORAL at 09:27

## 2022-12-24 RX ADMIN — INSULIN LISPRO 10 UNITS: 100 INJECTION, SOLUTION INTRAVENOUS; SUBCUTANEOUS at 12:49

## 2022-12-24 RX ADMIN — CARVEDILOL 12.5 MG: 12.5 TABLET, FILM COATED ORAL at 09:27

## 2022-12-24 RX ADMIN — LIDOCAINE 1 PATCH: 50 PATCH TOPICAL at 21:15

## 2022-12-24 RX ADMIN — LEVOTHYROXINE SODIUM 50 MCG: 0.05 TABLET ORAL at 09:27

## 2022-12-24 RX ADMIN — INSULIN LISPRO 4 UNITS: 100 INJECTION, SOLUTION INTRAVENOUS; SUBCUTANEOUS at 21:15

## 2022-12-24 RX ADMIN — LORAZEPAM 0.5 MG: 0.5 TABLET ORAL at 21:20

## 2022-12-24 RX ADMIN — DOCUSATE SODIUM 50MG AND SENNOSIDES 8.6MG 1 TABLET: 8.6; 5 TABLET, FILM COATED ORAL at 09:27

## 2022-12-24 RX ADMIN — CARVEDILOL 12.5 MG: 12.5 TABLET, FILM COATED ORAL at 21:14

## 2022-12-24 RX ADMIN — GABAPENTIN 600 MG: 300 CAPSULE ORAL at 09:27

## 2022-12-24 RX ADMIN — PREDNISONE 10 MG: 10 TABLET ORAL at 09:27

## 2022-12-24 RX ADMIN — POLYETHYLENE GLYCOL 3350 17 G: 17 POWDER, FOR SOLUTION ORAL at 09:27

## 2022-12-24 RX ADMIN — PANTOPRAZOLE SODIUM 40 MG: 40 TABLET, DELAYED RELEASE ORAL at 06:11

## 2022-12-24 RX ADMIN — INSULIN LISPRO 7 UNITS: 100 INJECTION, SOLUTION INTRAVENOUS; SUBCUTANEOUS at 16:17

## 2022-12-24 NOTE — THERAPY EVALUATION
Acute Care - Speech Language Pathology   Swallow Initial Evaluation [facility]     Patient Name: Helena Carmen  : 1931  MRN: [id number]  Today's Date: 2022               Admit Date: 2022    Visit Dx:     ICD-10-CM ICD-9-CM   1. Acute diastolic congestive heart failure (HCC)  I50.31 428.31     428.0   2. Acute pulmonary edema (HCC)  J81.0 518.4   3. Closed displaced fracture of proximal phalanx of right great toe, initial encounter  S92.411A 826.0     Patient Active Problem List   Diagnosis   • Aortic valve stenosis   • Fatigue   • MI (mitral incompetence)   • PVC (premature ventricular contraction)   • Legally blind   • Essential hypertension   • Hypertriglyceridemia   • Pulmonary hypertension (HCC)   • Paroxysmal atrial fibrillation (HCC)   • Anxiety   • Stage 4 chronic kidney disease (HCC)   • Chronic pain disorder   • Degeneration of lumbar intervertebral disc   • Type 2 diabetes mellitus with hyperglycemia (HCC)   • Esophageal reflux   • Gastroparesis   • Hiatal hernia   • Iatrogenic hypothyroidism   • Macular degeneration   • Malignant neoplasm of uterus (HCC)   • Osteoarthritis of knee   • Peripheral nerve disease   • Secondary hyperparathyroidism (HCC)   • Thrombocytopenia (HCC)   • Chronic heart failure with preserved ejection fraction (HCC)   • Other cirrhosis of liver (HCC)   • Hypothyroidism   • Nonrheumatic tricuspid valve regurgitation   • Anemia, chronic disease   • Closed fracture of fifth lumbar vertebra (HCC)   • Closed fracture of fifth lumbar vertebra, unspecified fracture morphology, initial encounter (HCC)   • Diabetic polyneuropathy associated with type 2 diabetes mellitus (HCC)   • Chronic ITP (idiopathic thrombocytopenia) (HCC)   • Chronic midline low back pain with bilateral sciatica   • Acute deep vein thrombosis of calf, bilateral (HCC)   • Acute diastolic congestive heart failure (HCC)   • Esophageal dysphagia     Past Medical History:   Diagnosis Date   • Aortic  valve stenosis     s/p tissue AVR   • Back pain    • CKD (chronic kidney disease)    • Colitis due to Clostridioides difficile 01/26/2022   • Diastolic dysfunction     Grade 2 per echocardiogram 2013   • Diverticulosis    • Exertional shortness of breath    • Heart disease    • Hiatal hernia    • Hyperlipidemia    • Hypertension    • Hyperthyroidism    • Hypertriglyceridemia 05/31/2018   • Hypothyroidism    • Left ventricular hypertrophy    • Legally blind    • Liver disease    • Macular degeneration    • Mitral regurgitation    • Osteoarthritis of hip    • Pancreatitis 01/26/2022   • Paroxysmal atrial fibrillation (HCC)    • Premature ventricular contractions    • Pulmonary hypertension (HCC)    • Renal insufficiency syndrome    • Type 2 diabetes mellitus (HCC)    • Uterine cancer (HCC)      Past Surgical History:   Procedure Laterality Date   • AORTIC VALVE REPAIR/REPLACEMENT     • CATARACT EXTRACTION      1970, 1999   • CHOLECYSTECTOMY     • ENDOSCOPY  08/15/2014    no gross lesions in stomach/duodenum, erythrematous mucosa in stomach   • HYSTERECTOMY  2007   • STERNOTOMY         SLP Recommendation and Plan  SLP Swallowing Diagnosis: R/O pharyngeal dysphagia, suspected esophageal dysphagia (12/24/22 1200)  SLP Diet Recommendation: soft to chew textures, ground, thin liquids (12/24/22 1200)  Recommended Precautions and Strategies: upright posture during/after eating, small bites of food and sips of liquid, alternate between small bites of food and sips of liquid (12/24/22 1200)  SLP Rec. for Method of Medication Administration: meds whole, with thin liquids, with puree, as tolerated (12/24/22 1200)     Monitor for Signs of Aspiration: yes (12/24/22 1200)     Swallow Criteria for Skilled Therapeutic Interventions Met: demonstrates skilled criteria (12/24/22 1200)     Rehab Potential/Prognosis, Swallowing: good, to achieve stated therapy goals (12/24/22 1200)  Therapy Frequency (Swallow): PRN (12/24/22  1200)  Predicted Duration Therapy Intervention (Days): until discharge (12/24/22 1200)                                        Plan of Care Reviewed With: patient  Outcome Evaluation: Clinical swallow eval completed. Pt tolerated thin (cup/straw), pureed, soft, and mixed consistencies w/ no overt s/s. Pt was very guarded wtih chewing and swallowing. Pt used liquid assist and multiple swallows to help food go down. After several trials, pt sensed food backing up with some coughing noted. Pt coughed up sputum w/o food. Pt continued to drink and felt like everything was clear. Recommend mech soft ground w/ thin; meds as tolerated; upright for meals and 30 min after; slow rate; small bites/sips; alternate solids/liquids. Feel difficulties are esophageal, GI consulted. ST to follow.      SWALLOW EVALUATION (last 72 hours)     SLP Adult Swallow Evaluation     Row Name 12/24/22 1200                   Rehab Evaluation    Document Type evaluation  -AW        Subjective Information no complaints  -AW        Patient Observations alert;cooperative;agree to therapy  -AW        Patient/Family/Caregiver Comments/Observations Pt oriented x4.  -AW        Patient Effort good  -AW        Symptoms Noted During/After Treatment none  -AW           General Information    Patient Profile Reviewed yes  -AW        Pertinent History Of Current Problem Pt admitted with weakness, SOB, acute on chronic CHF, and toe fracture. Pt is having difficulty with food coming up, h/o GERD. Esophagram showed slow motility and 2 diverticulums in the esophagus.  -AW        Current Method of Nutrition regular textures;thin liquids  -AW        Precautions/Limitations, Vision vision impairment, bilaterally  -AW        Precautions/Limitations, Hearing WFL  -AW        Prior Level of Function-Communication WFL  -AW        Prior Level of Function-Swallowing no diet consistency restrictions  -AW        Plans/Goals Discussed with patient;agreed upon  -AW         Barriers to Rehab medically complex  -AW        Patient's Goals for Discharge eat/drink without coughing/choking  -AW           Pain    Additional Documentation Pain Scale: Numbers Pre/Post-Treatment (Group)  -AW           Pain Scale: Numbers Pre/Post-Treatment    Pretreatment Pain Rating 7/10  -AW        Posttreatment Pain Rating 7/10  -AW        Pain Location - back  -AW        Pain Intervention(s) Repositioned  -AW           Oral Motor Structure and Function    Dentition Assessment upper dentures/partial in place;lower dentures/partial in place  -AW        Secretion Management WNL/WFL  -AW        Mucosal Quality moist, healthy  -AW           Oral Musculature and Cranial Nerve Assessment    Oral Motor General Assessment WFL  -AW           General Eating/Swallowing Observations    Respiratory Support Currently in Use room air  -AW        Eating/Swallowing Skills self-fed;fed by SLP  -AW        Positioning During Eating upright in bed  -AW        Utensils Used spoon;cup;straw  -AW        Consistencies Trialed thin liquids;pureed;soft to chew textures;mixed consistency  -AW        Pre SpO2 (%) 96  -AW        Post SpO2 (%) 98  -AW           Clinical Swallow Eval    Clinical Swallow Evaluation Summary Clinical swallow eval completed. Pt tolerated thin (cup/straw), pureed, soft, and mixed consistencies w/ no overt s/s. Pt was very guarded wtih chewing and swallowing. Pt used liquid assist and multiple swallows to help food go down. After several trials, pt sensed food backing up with some coughing noted. Pt coughed up sputum w/o food. Pt continued to drink and felt like everything was clear. Recommend mech soft ground w/ thin; meds as tolerated; upright for meals and 30 min after; slow rate; small bites/sips; alternate solids/liquids. Feel difficulties are esophageal, GI consulted. ST to follow.  -AW           SLP Evaluation Clinical Impression    SLP Swallowing Diagnosis R/O pharyngeal dysphagia;suspected esophageal  dysphagia  -AW        Functional Impact risk of aspiration/pneumonia;risk of malnutrition;risk of dehydration  -AW        Rehab Potential/Prognosis, Swallowing good, to achieve stated therapy goals  -AW        Swallow Criteria for Skilled Therapeutic Interventions Met demonstrates skilled criteria  -AW           Recommendations    Therapy Frequency (Swallow) PRN  -AW        Predicted Duration Therapy Intervention (Days) until discharge  -AW        SLP Diet Recommendation soft to chew textures;ground;thin liquids  -AW        Recommended Precautions and Strategies upright posture during/after eating;small bites of food and sips of liquid;alternate between small bites of food and sips of liquid  -AW        Oral Care Recommendations Oral Care BID/PRN  -AW        SLP Rec. for Method of Medication Administration meds whole;with thin liquids;with puree;as tolerated  -AW        Monitor for Signs of Aspiration yes  -AW           Swallow Goals (SLP)    Swallow STGs diet tolerance goal selection (SLP)  -AW           (STG) Patient will tolerate trials of    Consistencies Trialed (Tolerate trials) soft to chew (ground) textures;thin liquids  -AW        Desired Outcome (Tolerate trials) without signs/symptoms of aspiration  -AW        [place] (Tolerate trials) independently (over 90% accuracy)  -AW        Time Frame (Tolerate trials) by discharge  -AW              User Key  (r) = Recorded By, (t) = Taken By, (c) = Cosigned By    Initials Name Effective Dates    AW Tati Owens MS [facility]-SLP 06/16/21 -                 EDUCATION  The patient has been educated in the following areas:   Oral Care/Hydration Modified Diet Instruction.        SLP GOALS     Row Name 12/24/22 1200             (STG) Patient will tolerate trials of    Consistencies Trialed (Tolerate trials) soft to chew (ground) textures;thin liquids  -AW      Desired Outcome (Tolerate trials) without signs/symptoms of aspiration  -AW      [place] (Tolerate trials)  independently (over 90% accuracy)  -AW      Time Frame (Tolerate trials) by discharge  -AW            User Key  (r) = Recorded By, (t) = Taken By, (c) = Cosigned By    Initials Name Provider Type    Tati Carlisle MS CCC-SLP Speech and Language Pathologist                   Time Calculation:    Time Calculation- SLP     Row Name 12/24/22 1238             Time Calculation- SLP    SLP Start Time 1130  -AW      SLP Received On 12/24/22  -AW            User Key  (r) = Recorded By, (t) = Taken By, (c) = Cosigned By    Initials Name Provider Type    Tati Carlisle MS CCC-SLP Speech and Language Pathologist                Therapy Charges for Today     Code Description Service Date Service Provider Modifiers Qty    [id number] HC ST EVAL ORAL PHARYNG SWALLOW 4 12/24/2022 Tati Owens MS CCC-SLP GN 1               Tati Owens MS CCC-AGUILA  12/24/2022

## 2022-12-24 NOTE — PLAN OF CARE
Goal Outcome Evaluation:  Plan of Care Reviewed With: patient           Outcome Evaluation: Clinical swallow eval completed. Pt tolerated thin (cup/straw), pureed, soft, and mixed consistencies w/ no overt s/s. Pt was very guarded wtih chewing and swallowing. Pt used liquid assist and multiple swallows to help food go down. After several trials, pt sensed food backing up with some coughing noted. Pt coughed up sputum w/o food. Pt continued to drink and felt like everything was clear. Recommend mech soft ground w/ thin; meds as tolerated; upright for meals and 30 min after; slow rate; small bites/sips; alternate solids/liquids. Feel difficulties are esophageal, GI consulted. ST to follow.

## 2022-12-24 NOTE — PROGRESS NOTES
[facility] Progress Note      Patient Name: Helena Carmen  : 1931  MRN: [id number]  Primary Care Physician:  Mariah Hickman MD  Date of admission: 2022    Subjective     Interval History:   Again reports some problems with dysphagia  Breathing is comfortable on 2 L/min  Still weak; pain in right foot when standing    Review of Systems:   As noted above    Objective     Vitals:   Temp:  [97.1 °F (36.2 °C)-98.3 °F (36.8 °C)] 97.9 °F (36.6 °C)  Heart Rate:  [57-63] 57  Resp:  [16-18] 16  BP: (126-144)/(54-87) 126/87  Flow (L/min):  [2] 2    Intake/Output Summary (Last 24 hours) at 2022 1353  Last data filed at 2022 1324  Gross per 24 hour   Intake 920 ml   Output 500 ml   Net 420 ml       Physical Exam:    Constitutional: Awake, alert, NAD lying flat  HEENT: Sclera anicteric, no conjunctival injection, MMM, legally blind  Neck: Supple, no carotid bruit, trachea at midline, no JVD  Respiratory: Diminished in bases, few crackles, nonlabored on 2 L/min  Cardiovascular: RRR, with ectopy; 2/6M, no rub  Gastrointestinal: BS +, soft, nontender and nondistended  : No palpable bladder  Musculoskeletal: Trace edema; bruised right foot  Psychiatric: Appropriate affect, cooperative, oriented  Neurologic: moving all extremities, normal speech and mental status  Skin: Warm and dry    Scheduled Meds:     amLODIPine, 10 mg, Oral, Daily  carvedilol, 12.5 mg, Oral, BID  cholecalciferol, 1,000 Units, Oral, Daily  furosemide, 40 mg, Oral, Daily  gabapentin, 600 mg, Oral, BID  hydrALAZINE, 50 mg, Oral, BID  HYDROcodone-acetaminophen, 1 tablet, Oral, Q6H  insulin glargine, 20 Units, Subcutaneous, Nightly  insulin lispro, 0-9 Units, Subcutaneous, 4x Daily With Meals & Nightly  insulin lispro, 10 Units, Subcutaneous, TID With Meals  levothyroxine, 50 mcg, Oral, Daily  lidocaine, 1 patch, Transdermal, Q24H  montelukast, 10 mg, Oral, Nightly  pantoprazole, 40 mg, Oral, Q  AM  polyethylene glycol, 17 g, Oral, Daily  predniSONE, 10 mg, Oral, Daily With Breakfast  sennosides-docusate, 1 tablet, Oral, Daily  tamsulosin, 0.4 mg, Oral, Daily      IV Meds:        Results Reviewed:   I have personally reviewed the results from the time of this admission to 12/24/2022 13:53 EST     Results from last 7 days   Lab Units 12/24/22  0342 12/23/22  0703 12/22/22  0700 12/21/22  0724 12/20/22  1350   SODIUM mmol/L 138 139 140   < > 136   POTASSIUM mmol/L 4.1 4.1 3.8   < > 4.5   CHLORIDE mmol/L 101 101 100   < > 99   CO2 mmol/L 28.0 30.0* 29.8*   < > 26.8   BUN mg/dL 49* 47* 46*   < > 30*   CREATININE mg/dL 1.88* 1.98* 2.11*   < > 1.59*   CALCIUM mg/dL 8.7 8.7 8.8   < > 8.7   BILIRUBIN mg/dL  --   --   --   --  0.7   ALK PHOS U/L  --   --   --   --  72   ALT (SGPT) U/L  --   --   --   --  32   AST (SGOT) U/L  --   --   --   --  21   GLUCOSE mg/dL 175* 175* 206*   < > 325*    < > = values in this interval not displayed.       Estimated Creatinine Clearance: 17.3 mL/min (A) (by C-G formula based on SCr of 1.88 mg/dL (H)).    Results from last 7 days   Lab Units 12/24/22  0342 12/23/22  0703 12/22/22  0700   MAGNESIUM mg/dL 1.9 1.9 1.8   PHOSPHORUS mg/dL 4.1 4.4 5.0*             Results from last 7 days   Lab Units 12/23/22  0703 12/21/22  0724 12/20/22  1207   WBC 10*3/mm3 6.27 6.92 8.96   HEMOGLOBIN g/dL 11.7* 12.4 12.9   PLATELETS 10*3/mm3 55* 66* 69*             Assessment / Plan     ASSESSMENT:  1.  NADER on CKD3b, nonoliguric, with SCR at a plateau.  Potassium is normal. Improving volume excess by exam. Urinalysis bland with no blood and only trace protein  2.  CHF, stabilizing. Chest x-ray 12/21 showed decreasing atelectasis/infiltrate vs prior day  3.  Recent fall 12/7/22 causing right first toe fracture  4.  Chronic ITP  5.  DM2 with hyperglycemia  6.  Elevated troponin  7.  Aortic valve stenosis s/p tissue AVR    PLAN:  1.  Continue oral furosemide 40 mg daily (home dose had just been furosemide  20 mg daily)  2.  Surveillance labs  3.  Discussed with son at bedside    Thank you for involving us in the care of Helena Carmen.  Please feel free to call with any questions.    Jordan Vázquez MD  12/24/22  13:53 EST    [facility]  [phone]    Please note that portions of this note were completed with a voice recognition program.

## 2022-12-24 NOTE — CONSULTS
[facility]  Initial Inpatient Consult Note    Referring Provider: Mariah YOU MD    Reason for Consultation: Esophageal dysphagia    Subjective     History of present illness:    89 y.o. female followed by Dr. Freddy Martel and last seen in 2019.  Presented with complaints of shortness of breath.  Some reference made to gastroparesis requiring prokinetic agents in the past.  Recent issues with dysphagia and regurgitation.  Speech pathology evaluation noted, esophageal fluoroscopy performed yesterday that revealed 2 diverticuli in the middle and distal thirds of the esophagus with slight delayed clearance of contrast and intermittent incomplete distention of the distal third of the esophagus as well as mild reflux.  No persistent strictures were noted.  The patient describes symptoms of swallowing difficulty only in the recent past.  She does admit to reflux which has been an intermittent problem for her.  There may been a mild weight loss during this hospitalization but otherwise weight has been stable.  Bowel pattern is intact.  The patient is currently eating lunch in a supine position and I discouraged this although she does have back problems that preclude her from sitting erect easily.    Past Medical History:  Past Medical History:   Diagnosis Date   • Aortic valve stenosis     s/p tissue AVR   • Back pain    • CKD (chronic kidney disease)    • Colitis due to Clostridioides difficile 01/26/2022   • Diastolic dysfunction     Grade 2 per echocardiogram 2013   • Diverticulosis    • Exertional shortness of breath    • Heart disease    • Hiatal hernia    • Hyperlipidemia    • Hypertension    • Hyperthyroidism    • Hypertriglyceridemia 05/31/2018   • Hypothyroidism    • Left ventricular hypertrophy    • Legally blind    • Liver disease    • Macular degeneration    • Mitral regurgitation    • Osteoarthritis of hip    • Pancreatitis 01/26/2022   • Paroxysmal atrial fibrillation (HCC)     • Premature ventricular contractions    • Pulmonary hypertension (HCC)    • Renal insufficiency syndrome    • Type 2 diabetes mellitus (HCC)    • Uterine cancer (HCC)      Past Surgical History:  Past Surgical History:   Procedure Laterality Date   • AORTIC VALVE REPAIR/REPLACEMENT     • CATARACT EXTRACTION      ,    • CHOLECYSTECTOMY     • ENDOSCOPY  08/15/2014    no gross lesions in stomach/duodenum, erythrematous mucosa in stomach   • HYSTERECTOMY     • STERNOTOMY        Social History:   Social History     Tobacco Use   • Smoking status: Former     Packs/day: 0.50     Types: Cigarettes     Quit date: 7/10/1969     Years since quittin.4   • Smokeless tobacco: Never   Substance Use Topics   • Alcohol use: No     Comment: caffeine use - coffee 2 cups daily      Family History:  Family History   Problem Relation Age of Onset   • Heart disease Mother    • Hypertension Mother    • Stroke Mother    • Diabetes Mother         mellitus   • Other Other         cardiovascular disorder       Home Meds:  Medications Prior to Admission   Medication Sig Dispense Refill Last Dose   • amLODIPine (NORVASC) 10 MG tablet Take 1 tablet by mouth daily.   2022   • carvedilol (COREG) 12.5 MG tablet Take 1 tablet by mouth 2 (Two) Times a Day. 60 tablet 11 2022   • cetirizine (zyrTEC) 5 MG tablet Take 1 tablet by mouth Daily As Needed for Allergies.   2022   • cholecalciferol (VITAMIN D3) 25 MCG (1000 UT) tablet Take 1 tablet by mouth Daily.   2022   • famotidine (PEPCID) 10 MG tablet Take 10 mg by mouth 2 (Two) Times a Day.   2022   • furosemide (LASIX) 20 MG tablet Take 1 tablet by mouth Daily. 30 tablet 11 2022   • gabapentin (NEURONTIN) 400 MG capsule Take 600 mg by mouth 2 (Two) Times a Day.   2022   • hydrALAZINE (APRESOLINE) 25 MG tablet Take 3 tablets by mouth Every 8 (Eight) Hours. (Patient taking differently: Take 50 mg by mouth 2 (Two) Times a Day.)   2022   •  HYDROcodone-acetaminophen (NORCO) 7.5-325 MG per tablet Take 1 tablet by mouth Every 6 (Six) Hours.   12/20/2022   • insulin glargine (LANTUS, SEMGLEE) 100 UNIT/ML injection Inject 34 Units under the skin into the appropriate area as directed Every Night. (Patient taking differently: Inject 20 Units under the skin into the appropriate area as directed Every Night.)  12 12/19/2022   • insulin lispro (humaLOG) 100 UNIT/ML injection Inject 0-10 Units under the skin into the appropriate area as directed 3 (Three) Times a Day Before Meals. 2 units for every 50 > 150   12/20/2022   • levothyroxine (SYNTHROID, LEVOTHROID) 25 MCG tablet Take 50 mcg by mouth Daily.   12/20/2022   • lidocaine (LIDODERM) 5 % Place 1 patch on the skin as directed by provider Daily. Remove & Discard patch within 12 hours or as directed by MD 6 each 0 Past Week   • Loratadine (CLARITIN PO) Take  by mouth.   12/20/2022   • LORazepam (ATIVAN) 0.5 MG tablet Take 1 tablet by mouth Every 8 (Eight) Hours As Needed for Anxiety. 10 tablet 0 12/19/2022   • methocarbamol (ROBAXIN) 750 MG tablet Take 1 tablet by mouth Every 6 (Six) Hours As Needed for Muscle Spasms. (Patient taking differently: Take 750 mg by mouth Every 8 (Eight) Hours As Needed for Muscle Spasms.)   12/20/2022   • montelukast (SINGULAIR) 10 MG tablet Take 1 tablet by mouth Every Night.   12/20/2022   • polyethylene glycol (MIRALAX) 17 GM/SCOOP powder Take 17 g by mouth Daily.   12/20/2022   • predniSONE (DELTASONE) 5 MG tablet Take 3 tablets by mouth Daily With Breakfast. (Patient taking differently: Take 10 mg by mouth Daily With Breakfast.) 270 tablet 0 12/20/2022   • sennosides-docusate (PERICOLACE) 8.6-50 MG per tablet Take 1 tablet by mouth Daily.   12/20/2022   • sodium bicarbonate 650 MG tablet Take 1 tablet by mouth 2 (Two) Times a Day. 60 tablet 0 12/20/2022   • tamsulosin (FLOMAX) 0.4 MG capsule 24 hr capsule Take 0.4 mg by mouth every night at bedtime.   12/20/2022   •  melatonin 1 MG tablet Take  by mouth.        Current Meds:   amLODIPine, 10 mg, Oral, Daily  carvedilol, 12.5 mg, Oral, BID  cholecalciferol, 1,000 Units, Oral, Daily  furosemide, 40 mg, Oral, Daily  gabapentin, 600 mg, Oral, BID  hydrALAZINE, 50 mg, Oral, BID  HYDROcodone-acetaminophen, 1 tablet, Oral, Q6H  insulin glargine, 20 Units, Subcutaneous, Nightly  insulin lispro, 0-9 Units, Subcutaneous, 4x Daily With Meals & Nightly  insulin lispro, 10 Units, Subcutaneous, TID With Meals  levothyroxine, 50 mcg, Oral, Daily  lidocaine, 1 patch, Transdermal, Q24H  montelukast, 10 mg, Oral, Nightly  pantoprazole, 40 mg, Oral, Q AM  polyethylene glycol, 17 g, Oral, Daily  predniSONE, 10 mg, Oral, Daily With Breakfast  sennosides-docusate, 1 tablet, Oral, Daily  tamsulosin, 0.4 mg, Oral, Daily      Allergies:  Allergies   Allergen Reactions   • Erythromycin Unknown (See Comments) and Other (See Comments)     Pt states she does not remember but it was many years ago  Pt states she does not remember but it was many years ago   • Statins Myalgia   • Cephalexin Other (See Comments) and Unknown (See Comments)     June 2022 Pt has tolerated ceftriaxone and cefepime during admission.   Pt states she does not remember reaction but it was many years ago   • Penicillins Rash   • Sulfa Antibiotics Itching and Rash     Review of Systems  Pertinent items are noted in HPI, all other systems reviewed and negative     Objective     Vital Signs  Temp:  [97.1 °F (36.2 °C)-98.3 °F (36.8 °C)] 97.6 °F (36.4 °C)  Heart Rate:  [60-63] 60  Resp:  [16-18] 16  BP: (129-168)/(54-70) 144/61  Physical Exam:  General Appearance:    Alert, cooperative, in no acute distress   Head:    Normocephalic, without obvious abnormality, atraumatic   Eyes:          conjunctivae and sclerae normal, no   icterus   Throat:   no thrush, oral mucosa moist   Neck:   Supple, no adenopathy   Lungs:     Clear to auscultation bilaterally    Heart:    Regular rhythm and normal  rate    Chest Wall:    No abnormalities observed   Abdomen:     Soft, nondistended, nontender; normal bowel sounds   Extremities:   no edema, no redness   Skin:   No bruising or rash   Psychiatric:  normal mood and insight     Results Review:   I reviewed the patient's new clinical results.    Results from last 7 days   Lab Units 12/23/22  0703 12/21/22  0724 12/20/22  1207   WBC 10*3/mm3 6.27 6.92 8.96   HEMOGLOBIN g/dL 11.7* 12.4 12.9   HEMATOCRIT % 36.3 38.1 39.3   PLATELETS 10*3/mm3 55* 66* 69*     Results from last 7 days   Lab Units 12/24/22  0342 12/23/22  0703 12/22/22  0700 12/21/22  0724 12/20/22  1350   SODIUM mmol/L 138 139 140   < > 136   POTASSIUM mmol/L 4.1 4.1 3.8   < > 4.5   CHLORIDE mmol/L 101 101 100   < > 99   CO2 mmol/L 28.0 30.0* 29.8*   < > 26.8   BUN mg/dL 49* 47* 46*   < > 30*   CREATININE mg/dL 1.88* 1.98* 2.11*   < > 1.59*   CALCIUM mg/dL 8.7 8.7 8.8   < > 8.7   BILIRUBIN mg/dL  --   --   --   --  0.7   ALK PHOS U/L  --   --   --   --  72   ALT (SGPT) U/L  --   --   --   --  32   AST (SGOT) U/L  --   --   --   --  21   GLUCOSE mg/dL 175* 175* 206*   < > 325*    < > = values in this interval not displayed.         Lab Results   Lab Value Date/Time    LIPASE 26 08/24/2022 1453       Radiology:  FL Esophagram Complete Single Contrast   Final Result   1. There are 2 diverticula in the esophagus at the approximate junction   of the middle and distal thirds.   2. Slight delayed clearance of barium from the esophagus with   intermittent incomplete distention of the distal-third of the esophagus.   3. There is mild gastroesophageal reflux.   4. No persistent esophageal strictures are seen.       FLUOROSCOPY TIME:  One minute 23 seconds.  One hundred two images.        This report was finalized on 12/23/2022 3:05 PM by Dr. Herbie Pike M.D.          XR Chest PA & Lateral   Final Result   Decreased bibasilar atelectasis or infiltrate. Small bilateral pleural   effusions       This report was  finalized on 12/21/2022 3:32 PM by Dr. Oziel Darling M.D.          XR Chest 1 View   Final Result      XR Foot 3+ View Right   Final Result   1. Fracture of the proximal phalanx of the right great toe.           This report was finalized on 12/20/2022 12:32 PM by Dr. Herbie Pike M.D.              Assessment & Plan   Patient Active Problem List   Diagnosis   • Aortic valve stenosis   • Fatigue   • MI (mitral incompetence)   • PVC (premature ventricular contraction)   • Legally blind   • Essential hypertension   • Hypertriglyceridemia   • Pulmonary hypertension (HCC)   • Paroxysmal atrial fibrillation (HCC)   • Anxiety   • Stage 4 chronic kidney disease (HCC)   • Chronic pain disorder   • Degeneration of lumbar intervertebral disc   • Type 2 diabetes mellitus with hyperglycemia (HCC)   • Esophageal reflux   • Gastroparesis   • Hiatal hernia   • Iatrogenic hypothyroidism   • Macular degeneration   • Malignant neoplasm of uterus (HCC)   • Osteoarthritis of knee   • Peripheral nerve disease   • Secondary hyperparathyroidism (HCC)   • Thrombocytopenia (HCC)   • Chronic heart failure with preserved ejection fraction (HCC)   • Other cirrhosis of liver (HCC)   • Hypothyroidism   • Nonrheumatic tricuspid valve regurgitation   • Anemia, chronic disease   • Closed fracture of fifth lumbar vertebra (HCC)   • Closed fracture of fifth lumbar vertebra, unspecified fracture morphology, initial encounter (HCC)   • Diabetic polyneuropathy associated with type 2 diabetes mellitus (HCC)   • Chronic ITP (idiopathic thrombocytopenia) (HCC)   • Chronic midline low back pain with bilateral sciatica   • Acute deep vein thrombosis of calf, bilateral (HCC)   • Acute diastolic congestive heart failure (HCC)   • Esophageal dysphagia       Assessment:  1. Esophageal dysphagia: Suspect some component of dysmotility as well as anatomic issues including her esophageal diverticuli.  No obvious esophageal stricture noted on fluoroscopic  examination.  2. History of gastroparesis with previous prokinetic use  3. Distant history of polyps  4. Chronic anemia  5. Possible cirrhosis of the liver  6. ITP  7. Thrombocytopenia  8. Diastolic congestive heart failure    Plan:  · Would encourage patient to sit upright and follow speech pathology recommendations regarding dietary intake.  · Do not see a need for endoscopic evaluation in this setting.  · Doubt the patient would be a candidate for surgical intervention to address her diverticular condition      I discussed the patients findings and my recommendations with patient, family and nursing staff.    Alex Raymond MD

## 2022-12-24 NOTE — PROGRESS NOTES
[facility]    [phone]   LOS: 3 days     Name: Helena Carmen  Age/Sex: 89 y.o. female  :  1931        PCP: Mariah Hickman MD  Chief Complaint   Patient presents with   • Ankle Pain   • Leg Pain   • Hip Pain   • Fall      Subjective   Still unable to eat this morning.  Denies other new issues or complaints right now she just feels like everything she tries to swallow things just are coming back up.  General: No Fever or Chills, Cardiac: No Chest Pain or Palpitations, Resp: No Cough or SOA, GI: No Nausea, Vomiting, or Diarrhea and Other: No bleeding    amLODIPine, 10 mg, Oral, Daily  carvedilol, 12.5 mg, Oral, BID  cholecalciferol, 1,000 Units, Oral, Daily  furosemide, 40 mg, Oral, Daily  gabapentin, 600 mg, Oral, BID  hydrALAZINE, 50 mg, Oral, BID  HYDROcodone-acetaminophen, 1 tablet, Oral, Q6H  insulin glargine, 20 Units, Subcutaneous, Nightly  insulin lispro, 0-9 Units, Subcutaneous, 4x Daily With Meals & Nightly  insulin lispro, 10 Units, Subcutaneous, TID With Meals  levothyroxine, 50 mcg, Oral, Daily  lidocaine, 1 patch, Transdermal, Q24H  montelukast, 10 mg, Oral, Nightly  pantoprazole, 40 mg, Oral, Q AM  polyethylene glycol, 17 g, Oral, Daily  predniSONE, 10 mg, Oral, Daily With Breakfast  sennosides-docusate, 1 tablet, Oral, Daily  tamsulosin, 0.4 mg, Oral, Daily           Objective   Vital Signs  Temp:  [97.1 °F (36.2 °C)-98.3 °F (36.8 °C)] 97.6 °F (36.4 °C)  Heart Rate:  [60-63] 60  Resp:  [16-18] 16  BP: (129-168)/(54-70) 144/61  Body mass index is 34.43 kg/m².    Intake/Output Summary (Last 24 hours) at 2022 1022  Last data filed at 2022 0213  Gross per 24 hour   Intake 360 ml   Output 500 ml   Net -140 ml       Physical Exam  Vitals and nursing note reviewed.   Constitutional:       General: She is not in acute distress.     Appearance: Normal appearance. She is obese. She is ill-appearing.   Cardiovascular:      Rate and Rhythm: Normal rate and regular  rhythm.   Pulmonary:      Effort: No respiratory distress.      Breath sounds: Normal breath sounds.   Abdominal:      General: Bowel sounds are normal.      Palpations: Abdomen is soft.   Neurological:      General: No focal deficit present.      Mental Status: She is alert and oriented to person, place, and time.           Results Review:       I reviewed the patient's new clinical results.  Results from last 7 days   Lab Units 12/23/22  0703 12/21/22  0724 12/20/22  1207   WBC 10*3/mm3 6.27 6.92 8.96   HEMOGLOBIN g/dL 11.7* 12.4 12.9   PLATELETS 10*3/mm3 55* 66* 69*     Results from last 7 days   Lab Units 12/24/22  0342 12/23/22  0703 12/22/22  0700 12/21/22  0724 12/20/22  1350 12/20/22  1207   SODIUM mmol/L 138 139 140 141 136  --    POTASSIUM mmol/L 4.1 4.1 3.8 4.4 4.5  --    CHLORIDE mmol/L 101 101 100 100 99  --    CO2 mmol/L 28.0 30.0* 29.8* 30.4* 26.8  --    BUN mg/dL 49* 47* 46* 35* 30*  --    CREATININE mg/dL 1.88* 1.98* 2.11* 1.92* 1.59*  --    CALCIUM mg/dL 8.7 8.7 8.8 8.9 8.7  --    MAGNESIUM mg/dL 1.9 1.9 1.8  --   --  1.9   PHOSPHORUS mg/dL 4.1 4.4 5.0*  --   --   --    Estimated Creatinine Clearance: 17.3 mL/min (A) (by C-G formula based on SCr of 1.88 mg/dL (H)).  Lab Results   Component Value Date    HGBA1C 6.30 (H) 12/21/2022    HGBA1C 5.40 10/17/2022    HGBA1C 7.20 (H) 08/25/2022     Glucose   Date/Time Value Ref Range Status   12/24/2022 0551 139 (H) 70 - 130 mg/dL Final     Comment:     Meter: [id number] : [id number] Daivd Hearn ELIZABETH   12/23/2022 1939 390 (H) 70 - 130 mg/dL Final     Comment:     Meter: [id number] : [id number] David Nadiya    12/23/2022 1628 207 (H) 70 - 130 mg/dL Final     Comment:     Meter: [id number] : [id number] Gerhard Harmon    12/23/2022 1404 165 (H) 70 - 130 mg/dL Final     Comment:     Meter: [id number] : [id number] Jarrod Montilla    12/23/2022 0556 194 (H) 70 - 130 mg/dL Final     Comment:     Meter: [id number] : [id number] Patricia JOHNSON    12/22/2022 2044 411 (C) 70 - 130 mg/dL Final     Comment:     Confirmed by Repeat Repeat Test RN Notified R and V Meter: [id number] :    12/22/2022 2042 408 (C) 70 - 130 mg/dL Final     Comment:     Repeat Test RN Notified R and V Meter: [id number] : [id number] Patricia García    12/22/2022 1647 368 (H) 70 - 130 mg/dL Final     Comment:     Meter: [id number] : [id number] Ba Eden NA         Assessment & Plan   Active Hospital Problems    Diagnosis  POA   • **Acute diastolic congestive heart failure (HCC) [I50.31]  Yes   • Chronic ITP (idiopathic thrombocytopenia) (HCC) [D69.3]  Yes   • Other cirrhosis of liver (HCC) [K74.69]  Yes   • Type 2 diabetes mellitus with hyperglycemia (HCC) [E11.65]  Yes   • Gastroparesis [K31.84]  Yes   • Pulmonary hypertension (HCC) [I27.20]  Yes   • Paroxysmal atrial fibrillation (HCC) [I48.0]  Yes   • Essential hypertension [I10]  Yes   • Secondary hyperparathyroidism (HCC) [N25.81]  Yes   • Stage 4 chronic kidney disease (HCC) [N18.4]  Yes      Resolved Hospital Problems   No resolved problems to display.       PLAN  This is a 89-year-old female with a history of of type 2 diabetes hypertension paroxysmal A. fib chronic kidney disease secondary hyperparathyroidism chronic ITP and chronic diastolic heart failure who presents to the hospital with shortness of breath and weakness with back and hip pain and is found to have acute on chronic diastolic heart failure and impaired mobility  -Based on her esophagram I think this is probably more esophageal dysphagia.  She has a couple diverticulum's and also noted to have some muscular discoordination  -Unfortunately speech therapy did miss evaluating the patient.  Hopefully they are able to evaluate the patient today.  -She does have a fracture of her toe.  Recommendations are for conservative management.  PT and OT are following her here in the hospital.  We will plan home health at discharge.  -Continue oral diuretics for the  time being.  -Still having a lot of other musculoskeletal pain in her back and her knee.  I think she probably has some musculoskeletal strains.  I suspect she is probably sprained her knee and strained the muscles in her back.  X-rays of all been negative.  Management would being pain control and physical therapy.  Plan discussed with family  -Referrals were made to [facility] and extended care housecalls.  Discussed with oncology and cardiology and both are on board with virtual visits if needed.  -Blood sugars are improved today.  Continue current monitoring with basal insulin and mealtime coverage with correctional factor insulin  -Mechanical DVT prophylaxis  -DNR    Disposition  Plan potential discharge home with home health soon      Eran Balderrama MD  [facility]  12/24/22  10:22 EST

## 2022-12-24 NOTE — PLAN OF CARE
Problem: Adult Inpatient Plan of Care  Goal: Plan of Care Review  12/24/2022 1602 by Katie Sherwood, RN  Flowsheets (Taken 12/24/2022 1602)  Outcome Evaluation: VSS, Speech consulted, Pt diet is mech soft ground w/ thin liquids, AOX4, Pain meds given  12/24/2022 1538 by Katie Sherwood, RN  Outcome: Ongoing, Progressing   Goal Outcome Evaluation:              Outcome Evaluation: VSS, Speech consulted, Pt diet is mech soft ground w/ thin liquids, AOX4, Pain meds given

## 2022-12-25 ENCOUNTER — READMISSION MANAGEMENT (OUTPATIENT)
Dept: CALL CENTER | Facility: HOSPITAL | Age: 87
End: 2022-12-25

## 2022-12-25 VITALS
WEIGHT: 162.3 LBS | BODY MASS INDEX: 35.02 KG/M2 | SYSTOLIC BLOOD PRESSURE: 172 MMHG | DIASTOLIC BLOOD PRESSURE: 70 MMHG | HEART RATE: 59 BPM | RESPIRATION RATE: 16 BRPM | OXYGEN SATURATION: 96 % | TEMPERATURE: 97.5 F | HEIGHT: 57 IN

## 2022-12-25 PROBLEM — I50.31 ACUTE DIASTOLIC CONGESTIVE HEART FAILURE: Status: RESOLVED | Noted: 2022-12-20 | Resolved: 2022-12-25

## 2022-12-25 PROBLEM — R13.19 ESOPHAGEAL DYSPHAGIA: Status: RESOLVED | Noted: 2022-12-24 | Resolved: 2022-12-25

## 2022-12-25 LAB
GLUCOSE BLDC GLUCOMTR-MCNC: 127 MG/DL (ref 70–130)
GLUCOSE BLDC GLUCOMTR-MCNC: 95 MG/DL (ref 70–130)

## 2022-12-25 PROCEDURE — 99232 SBSQ HOSP IP/OBS MODERATE 35: CPT | Performed by: INTERNAL MEDICINE

## 2022-12-25 PROCEDURE — 82962 GLUCOSE BLOOD TEST: CPT

## 2022-12-25 PROCEDURE — 63710000001 PREDNISONE PER 5 MG: Performed by: INTERNAL MEDICINE

## 2022-12-25 PROCEDURE — 63710000001 INSULIN LISPRO (HUMAN) PER 5 UNITS: Performed by: HOSPITALIST

## 2022-12-25 RX ORDER — FUROSEMIDE 40 MG/1
40 TABLET ORAL DAILY
Qty: 30 TABLET | Refills: 0 | Status: SHIPPED | OUTPATIENT
Start: 2022-12-25 | End: 2023-03-14 | Stop reason: HOSPADM

## 2022-12-25 RX ORDER — PREDNISONE 1 MG/1
10 TABLET ORAL
Start: 2022-12-25 | End: 2023-02-23 | Stop reason: SDUPTHER

## 2022-12-25 RX ORDER — PANTOPRAZOLE SODIUM 40 MG/1
40 TABLET, DELAYED RELEASE ORAL
Qty: 30 TABLET | Refills: 0 | Status: SHIPPED | OUTPATIENT
Start: 2022-12-26 | End: 2023-01-25

## 2022-12-25 RX ORDER — METHOCARBAMOL 750 MG/1
750 TABLET, FILM COATED ORAL EVERY 8 HOURS PRN
Start: 2022-12-25 | End: 2023-04-07 | Stop reason: HOSPADM

## 2022-12-25 RX ORDER — HYDRALAZINE HYDROCHLORIDE 25 MG/1
50 TABLET, FILM COATED ORAL 2 TIMES DAILY
Start: 2022-12-25

## 2022-12-25 RX ADMIN — INSULIN LISPRO 10 UNITS: 100 INJECTION, SOLUTION INTRAVENOUS; SUBCUTANEOUS at 10:15

## 2022-12-25 RX ADMIN — DOCUSATE SODIUM 50MG AND SENNOSIDES 8.6MG 1 TABLET: 8.6; 5 TABLET, FILM COATED ORAL at 10:15

## 2022-12-25 RX ADMIN — PANTOPRAZOLE SODIUM 40 MG: 40 TABLET, DELAYED RELEASE ORAL at 05:53

## 2022-12-25 RX ADMIN — POLYETHYLENE GLYCOL 3350 17 G: 17 POWDER, FOR SOLUTION ORAL at 10:15

## 2022-12-25 RX ADMIN — TAMSULOSIN HYDROCHLORIDE 0.4 MG: 0.4 CAPSULE ORAL at 10:16

## 2022-12-25 RX ADMIN — HYDRALAZINE HYDROCHLORIDE 50 MG: 50 TABLET, FILM COATED ORAL at 10:16

## 2022-12-25 RX ADMIN — HYDROCODONE BITARTRATE AND ACETAMINOPHEN 1 TABLET: 7.5; 325 TABLET ORAL at 10:15

## 2022-12-25 RX ADMIN — AMLODIPINE BESYLATE 10 MG: 10 TABLET ORAL at 10:15

## 2022-12-25 RX ADMIN — FUROSEMIDE 40 MG: 40 TABLET ORAL at 10:16

## 2022-12-25 RX ADMIN — GABAPENTIN 600 MG: 300 CAPSULE ORAL at 10:16

## 2022-12-25 RX ADMIN — HYDROCODONE BITARTRATE AND ACETAMINOPHEN 1 TABLET: 7.5; 325 TABLET ORAL at 05:53

## 2022-12-25 RX ADMIN — Medication 1000 UNITS: at 10:16

## 2022-12-25 RX ADMIN — PREDNISONE 10 MG: 10 TABLET ORAL at 10:16

## 2022-12-25 RX ADMIN — CARVEDILOL 12.5 MG: 12.5 TABLET, FILM COATED ORAL at 10:16

## 2022-12-25 RX ADMIN — LEVOTHYROXINE SODIUM 50 MCG: 0.05 TABLET ORAL at 10:16

## 2022-12-25 NOTE — NURSING NOTE
AVS reviewed with patient and family at bedside. IV removed. Instructed on new medications and follow up appointments that need to be made. Patient left with family in her wheelchair.

## 2022-12-25 NOTE — DISCHARGE SUMMARY
Patient Name: Helena Carmen  : 1931  MRN: [id number]    Date of Admission: 2022  Date of Discharge:  2022  Primary Care Physician: Mariah Hickman MD      Chief Complaint:   Ankle Pain, Leg Pain, Hip Pain, and Fall      Discharge Diagnoses     Active Hospital Problems    Diagnosis  POA   • Chronic ITP (idiopathic thrombocytopenia) (HCC) [D69.3]  Yes   • Other cirrhosis of liver (HCC) [K74.69]  Yes   • Type 2 diabetes mellitus with hyperglycemia (HCC) [E11.65]  Yes   • Gastroparesis [K31.84]  Yes   • Pulmonary hypertension (HCC) [I27.20]  Yes   • Paroxysmal atrial fibrillation (HCC) [I48.0]  Yes   • Essential hypertension [I10]  Yes   • Secondary hyperparathyroidism (HCC) [N25.81]  Yes   • Stage 4 chronic kidney disease (HCC) [N18.4]  Yes      Resolved Hospital Problems    Diagnosis Date Resolved POA   • **Acute diastolic congestive heart failure (HCC) [I50.31] 2022 Yes   • Esophageal dysphagia [R13.19] 2022 Yes        Hospital Course     Ms. Carmen is a 89 y.o. female with a history of DM2, paroxysmal afib (questionable diagnosis per cardiology, not on AC), CKD, secondary hyperparathyroidism, chronic ITP, and chronic diastolic heart failure who presented to [facility] initially complaining of shortness of breath, weakness, back and hip pain.  Please see the admitting history and physical for further details.  She was found to have acute on chronic diastolic heart failure and was admitted to the hospital for further evaluation and treatment.      She was seen in consultation by cardiology and nephrology and started on iv diuretics with improvement in her symptoms. She has been transitioned to oral diuretics at a higher dose than she was previously on. She will be discharged home today with home health.     With regard to her pain and weakness, x-rays were negative and it was felt that she probably sprained her knee and strained her back. Home physical therapy  is ordered at discharge.    Her course was complicated by some dysphagia. She was seen by speech therapy and gastroenterology and she underwent esophageal fluoroscopy which showed 2 diverticuli in the middle and distal thirds of the esophagus with slightly delayed clearance of contrast and intermittent incomplete distention of the distal third of the esophagus as well as mild reflux. GI recommended against endoscopic evaluation in the inpatient setting and felt that she likely isn't a surgical candidate. Speech recommended sitting up right with meals and a modified diet. She was started on a PPI. She will need to follow up with GI as an outpatient    Day of Discharge     Subjective:  She reports that he dysphagia is a little bit better today. Still having belching. She has been started on a PPI.    Physical Exam:  Temp:  [97.5 °F (36.4 °C)-97.9 °F (36.6 °C)] 97.5 °F (36.4 °C)  Heart Rate:  [54-58] 58  Resp:  [16] 16  BP: (126-152)/(50-87) 143/50  Body mass index is 35.12 kg/m².  Physical Exam  Constitutional:       General: She is not in acute distress.     Appearance: She is obese. She is ill-appearing (chronically). She is not toxic-appearing.   Cardiovascular:      Rate and Rhythm: Normal rate and regular rhythm.      Heart sounds: Normal heart sounds.   Pulmonary:      Effort: Pulmonary effort is normal.      Breath sounds: Normal breath sounds.   Abdominal:      General: Bowel sounds are normal.      Palpations: Abdomen is soft.   Musculoskeletal:         General: No tenderness.      Right lower leg: No edema.      Left lower leg: No edema.   Neurological:      Mental Status: She is alert.   Psychiatric:         Mood and Affect: Mood normal.         Behavior: Behavior normal.         Consultants     Consult Orders (all) (From admission, onward)     Start     Ordered    12/24/22 1023  Inpatient Gastroenterology Consult  Once        Specialty:  Gastroenterology  Provider:  Jacqueline Diaz MD    12/24/22 1022     12/21/22 1444  Inpatient Nephrology Consult  Once        Specialty:  Nephrology  Provider:  Rolando Wick MD    12/21/22 1443    12/20/22 2049  Inpatient Cardiology Consult  Once        Specialty:  Cardiology  Provider:  Beth Butcher MD    12/20/22 2048    12/20/22 1725  Inpatient Case Management  Consult  Once        Provider:  (Not yet assigned)    12/20/22 1725    12/20/22 1725  Inpatient Spiritual Care Consult  Once        Comments: Wants communion every Sunday   Provider:  (Not yet assigned)    12/20/22 1725    12/20/22 1424  LIP[facility] (on-call MD unless specified)  Once        Specialty:  Internal Medicine  Provider:  (Not yet assigned)    12/20/22 1423              Procedures     Imaging Results (All)     Procedure Component Value Units Date/Time    FL Esophagram Complete Single Contrast [[id number]] Collected: 12/23/22 1441     Updated: 12/23/22 1508    Narrative:      ESOPHAGRAM 12/23/2022     HISTORY: Evaluate esophageal dysfunction.      image of the chest shows mild cardiomegaly. There is calcification  of the mitral valve annulus.      Prosthetic aortic valve and sternotomy wires are seen.     Single contrast esophagram was performed. The patient was able to ingest  the barium without difficulty. There is slight delayed clearance of  barium from the esophagus. There is a moderate-sized diverticulum  arising from the anterior leftward aspect of the esophagus at the  approximate junction of the middle and distal thirds measuring 2.5 cm. A  second smaller diverticulum is seen adjacent to the inferior aspect of  this larger diverticulum measuring approximately 12 mm. The esophagus  distal to the diverticulum was somewhat slow to distend but  intermittently appeared to distend relatively well with no strictures  seen. Contrast passes easily into the stomach.     There is mild gastroesophageal reflux. No hiatal hernia is seen.       Impression:      1. There are 2 diverticula  in the esophagus at the approximate junction  of the middle and distal thirds.  2. Slight delayed clearance of barium from the esophagus with  intermittent incomplete distention of the distal-third of the esophagus.  3. There is mild gastroesophageal reflux.  4. No persistent esophageal strictures are seen.     FLUOROSCOPY TIME:  One minute 23 seconds.  One hundred two images.      This report was finalized on 12/23/2022 3:05 PM by Dr. Herbie Pike M.D.       XR Chest PA & Lateral [[id number]] Collected: 12/21/22 1531     Updated: 12/21/22 1535    Narrative:      PA AND LATERAL CHEST     HISTORY: Shortness of breath     COMPARISON: 12/20/2022     FINDINGS: Decreased bibasilar atelectasis or infiltrate. Heart size  stable. Small bilateral pleural effusions. Thoracic spine degenerative  changes.       Impression:      Decreased bibasilar atelectasis or infiltrate. Small bilateral pleural  effusions     This report was finalized on 12/21/2022 3:32 PM by Dr. Oziel Darling M.D.       XR Foot 3+ View Right [[id number]] Collected: 12/20/22 1228     Updated: 12/20/22 1235    Narrative:      XR FOOT 3+ VW RIGHT-  12/20/2022     HISTORY: Fell on December 7. Foot pain and swelling.     There is a mildly displaced fracture involving the lateral aspect of the  base of the proximal phalanx of the right great toe. Bones are  demineralized. There are some minimal hallux valgus deformity of the  right first metatarsophalangeal joint.     No other fractures or dislocations are seen. Calcaneal spurs are seen.       Impression:      1. Fracture of the proximal phalanx of the right great toe.        This report was finalized on 12/20/2022 12:32 PM by Dr. Herbie Pike M.D.       XR Chest 1 View [[id number]] Collected: 12/20/22 1227     Updated: 12/20/22 1231    Narrative:      XR CHEST 1 VW-  12/20/2022     HISTORY: Shortness of breath.     Heart size is mildly enlarged. There are some mild patchy increased  density in the  right lung base and minimally in the left lung base. This  could represent some mild bibasilar atelectasis, atypical pulmonary  edema and/or developing pneumonia. Please correlate with the clinical  findings. Sternotomy wires are seen. No pneumothorax is seen.     This report was finalized on 12/20/2022 12:28 PM by Dr. Herbie Pike M.D.             Pertinent Labs     Results from last 7 days   Lab Units 12/23/22  0703 12/21/22  0724 12/20/22  1207   WBC 10*3/mm3 6.27 6.92 8.96   HEMOGLOBIN g/dL 11.7* 12.4 12.9   PLATELETS 10*3/mm3 55* 66* 69*     Results from last 7 days   Lab Units 12/24/22  0342 12/23/22  0703 12/22/22  0700 12/21/22  0724   SODIUM mmol/L 138 139 140 141   POTASSIUM mmol/L 4.1 4.1 3.8 4.4   CHLORIDE mmol/L 101 101 100 100   CO2 mmol/L 28.0 30.0* 29.8* 30.4*   BUN mg/dL 49* 47* 46* 35*   CREATININE mg/dL 1.88* 1.98* 2.11* 1.92*   GLUCOSE mg/dL 175* 175* 206* 182*   Estimated Creatinine Clearance: 17.4 mL/min (A) (by C-G formula based on SCr of 1.88 mg/dL (H)).  Results from last 7 days   Lab Units 12/24/22  0342 12/23/22  0703 12/22/22  0700 12/20/22  1350   ALBUMIN g/dL 3.30* 3.40* 3.00* 3.30*   BILIRUBIN mg/dL  --   --   --  0.7   ALK PHOS U/L  --   --   --  72   AST (SGOT) U/L  --   --   --  21   ALT (SGPT) U/L  --   --   --  32     Results from last 7 days   Lab Units 12/24/22  0342 12/23/22  0703 12/22/22  0700 12/21/22  0724 12/20/22  1350 12/20/22  1350 12/20/22  1207   CALCIUM mg/dL 8.7 8.7 8.8 8.9   < > 8.7  --    ALBUMIN g/dL 3.30* 3.40* 3.00*  --   --  3.30*  --    MAGNESIUM mg/dL 1.9 1.9 1.8  --   --   --  1.9   PHOSPHORUS mg/dL 4.1 4.4 5.0*  --   --   --   --     < > = values in this interval not displayed.       Results from last 7 days   Lab Units 12/20/22  2113 12/20/22  1350 12/20/22  1207   TROPONIN T ng/mL 0.052* 0.051*  --    PROBNP pg/mL  --   --  4,161.0*           Invalid input(s): LDLCALC        Test Results Pending at Discharge       Discharge Details        Discharge  Medications      New Medications      Instructions Start Date   pantoprazole 40 MG EC tablet  Commonly known as: PROTONIX   40 mg, Oral, Every Early Morning   Start Date: December 26, 2022        Changes to Medications      Instructions Start Date   furosemide 40 MG tablet  Commonly known as: LASIX  What changed:   · medication strength  · how much to take   40 mg, Oral, Daily         Continue These Medications      Instructions Start Date   amLODIPine 10 MG tablet  Commonly known as: NORVASC   1 tablet, Oral, Daily      carvedilol 12.5 MG tablet  Commonly known as: COREG   12.5 mg, Oral, 2 Times Daily      cetirizine 5 MG tablet  Commonly known as: zyrTEC   5 mg, Oral, Daily PRN      cholecalciferol 25 MCG (1000 UT) tablet  Commonly known as: VITAMIN D3   1,000 Units, Oral, Daily      CLARITIN PO   Oral      gabapentin 400 MG capsule  Commonly known as: NEURONTIN   600 mg, Oral, 2 Times Daily      hydrALAZINE 25 MG tablet  Commonly known as: APRESOLINE   50 mg, Oral, 2 Times Daily      HYDROcodone-acetaminophen 7.5-325 MG per tablet  Commonly known as: NORCO   1 tablet, Oral, Every 6 Hours      insulin glargine 100 UNIT/ML injection  Commonly known as: LANTUS, SEMGLEE   20 Units, Subcutaneous, Nightly      insulin lispro 100 UNIT/ML injection  Commonly known as: humaLOG   0-10 Units, Subcutaneous, 3 Times Daily Before Meals, 2 units for every 50 > 150      levothyroxine 25 MCG tablet  Commonly known as: SYNTHROID, LEVOTHROID   50 mcg, Oral, Daily      lidocaine 5 %  Commonly known as: LIDODERM   1 patch, Transdermal, Every 24 Hours, Remove & Discard patch within 12 hours or as directed by MD      LORazepam 0.5 MG tablet  Commonly known as: ATIVAN   0.5 mg, Oral, Every 8 Hours PRN      methocarbamol 750 MG tablet  Commonly known as: ROBAXIN   750 mg, Oral, Every 8 Hours PRN      montelukast 10 MG tablet  Commonly known as: SINGULAIR   1 tablet, Oral, Nightly      polyethylene glycol 17 GM/SCOOP powder  Commonly  known as: MIRALAX   17 g, Oral, Daily      predniSONE 5 MG tablet  Commonly known as: DELTASONE   10 mg, Oral, Daily With Breakfast      sennosides-docusate 8.6-50 MG per tablet  Commonly known as: PERICOLACE   1 tablet, Oral, Daily      tamsulosin 0.4 MG capsule 24 hr capsule  Commonly known as: FLOMAX   0.4 mg, Oral, Every Night at Bedtime         Stop These Medications    famotidine 10 MG tablet  Commonly known as: PEPCID     melatonin 1 MG tablet     sodium bicarbonate 650 MG tablet            Allergies   Allergen Reactions   • Erythromycin Unknown (See Comments) and Other (See Comments)     Pt states she does not remember but it was many years ago  Pt states she does not remember but it was many years ago   • Statins Myalgia   • Cephalexin Other (See Comments) and Unknown (See Comments)     June 2022 Pt has tolerated ceftriaxone and cefepime during admission.   Pt states she does not remember reaction but it was many years ago   • Penicillins Rash   • Sulfa Antibiotics Itching and Rash         Discharge Disposition:  Home-Health Care c    Discharge Diet:  Diet Order   Procedures   • Diet: Regular/House Diet, Diabetic Diets; Consistent Carbohydrate; Texture: Soft to Chew (NDD 3); Soft to Chew: Ground Meat; Fluid Consistency: Thin (IDDSI 0)       Discharge Activity:   Activity Instructions     Activity as Tolerated            CODE STATUS:    Code Status and Medical Interventions:   Ordered at: 12/20/22 1912     Medical Intervention Limits:    NO intubation (DNI)     Code Status (Patient has no pulse and is not breathing):    No CPR (Do Not Attempt to Resuscitate)     Medical Interventions (Patient has pulse or is breathing):    Limited Support       Future Appointments   Date Time Provider Department Center   12/28/2022  1:30 PM Sima Blum APRN [facility]   2/1/2023  3:20 PM LAB CHAIR [facility]  [facility]   2/1/2023  3:40 PM Pablo Sherman Jr., MD [facility]   2/6/2023   1:00 PM [facility]     Additional Instructions for the Follow-ups that You Need to Schedule     Ambulatory Referral to Home Health   As directed      Face to Face Visit Date: 12/25/2022    Follow-up provider for Plan of Care?: I treated the patient in an acute care facility and will not continue treatment after discharge.    Follow-up provider: MARIAH HICKMAN [[id number]]    Reason/Clinical Findings: debility/hospitalization    Describe mobility limitations that make leaving home difficult: debility/hospitalization    Nursing/Therapeutic Services Requested: Skilled Nursing Physical Therapy    Skilled nursing orders: Cardiopulmonary assessments Medication education CHF management    PT orders: Transfer training Home safety assessment Therapeutic exercise Strengthening    Frequency: 1 Week 1         Call MD With Problems / Concerns   As directed      Instructions: return to the hospital if you experience chest pain, shortness of breath, abdominal pain, nausea, vomiting, fevers, sweats, chills, or worsening of your symptoms    Order Comments: Instructions: return to the hospital if you experience chest pain, shortness of breath, abdominal pain, nausea, vomiting, fevers, sweats, chills, or worsening of your symptoms          Discharge Follow-up with PCP   As directed       Currently Documented PCP:    Mariah Hickman MD    PCP Phone Number:    [phone]     Follow Up Details: 1-2 weeks         Discharge Follow-up with Specialty: Cardiology; 1 Week   As directed      Specialty: Cardiology    Follow Up: 1 Week         Discharge Follow-up with Specialty: gastroenterology, 2-4 weeks or as otherwise directed   As directed      Specialty: gastroenterology, 2-4 weeks or as otherwise directed         Discharge Follow-up with Specified Provider: nephrology 2 weeks or as otherwise directed   As directed      To: nephrology 2 weeks or as otherwise directed            Contact information for  follow-up providers     Ervin Ward MD .    Specialty: Family Medicine  Why: 1-2 weeks  Contact information:  [address]  [facility] [address]  [phone]                   Contact information for after-discharge care     Home Medical Care     [facility]    Service: [facility]  Contact information:  [address], [address]  [facility] [address] [phone]                             Additional Instructions for the Follow-ups that You Need to Schedule     Ambulatory Referral to Home Health   As directed      Face to Face Visit Date: 12/25/2022    Follow-up provider for Plan of Care?: I treated the patient in an acute care facility and will not continue treatment after discharge.    Follow-up provider: ERVIN WARD [[id number]]    Reason/Clinical Findings: debility/hospitalization    Describe mobility limitations that make leaving home difficult: debility/hospitalization    Nursing/Therapeutic Services Requested: Skilled Nursing Physical Therapy    Skilled nursing orders: Cardiopulmonary assessments Medication education CHF management    PT orders: Transfer training Home safety assessment Therapeutic exercise Strengthening    Frequency: 1 Week 1         Call MD With Problems / Concerns   As directed      Instructions: return to the hospital if you experience chest pain, shortness of breath, abdominal pain, nausea, vomiting, fevers, sweats, chills, or worsening of your symptoms    Order Comments: Instructions: return to the hospital if you experience chest pain, shortness of breath, abdominal pain, nausea, vomiting, fevers, sweats, chills, or worsening of your symptoms          Discharge Follow-up with PCP   As directed       Currently Documented PCP:    Ervin Ward MD    PCP Phone Number:    [phone]     Follow Up Details: 1-2 weeks         Discharge Follow-up with Specialty: Cardiology; 1 Week   As directed      Specialty:  Cardiology    Follow Up: 1 Week         Discharge Follow-up with Specialty: gastroenterology, 2-4 weeks or as otherwise directed   As directed      Specialty: gastroenterology, 2-4 weeks or as otherwise directed         Discharge Follow-up with Specified Provider: nephrology 2 weeks or as otherwise directed   As directed      To: nephrology 2 weeks or as otherwise directed           Time Spent on Discharge:  Greater than 30 minutes      Remington Schwartz MD  [facility]  12/25/22  09:21 EST

## 2022-12-25 NOTE — PROGRESS NOTES
[facility]  Inpatient Progress Note    Reason for Follow Up:  Esophageal dysphagia    Subjective     Interval History:   Some mild trouble swallowing?    Current Facility-Administered Medications:   •  acetaminophen (TYLENOL) tablet 650 mg, 650 mg, Oral, Q4H PRN, Isidra Mancuso MD, 650 mg at 12/23/22 1821  •  amLODIPine (NORVASC) tablet 10 mg, 10 mg, Oral, Daily, Isidra Mancuso MD, 10 mg at 12/25/22 1015  •  carvedilol (COREG) tablet 12.5 mg, 12.5 mg, Oral, BID, Isidra Mancuso MD, 12.5 mg at 12/25/22 1016  •  cholecalciferol (VITAMIN D3) tablet 1,000 Units, 1,000 Units, Oral, Daily, Isidra Mancuso MD, 1,000 Units at 12/25/22 1016  •  dextrose (D50W) (25 g/50 mL) IV injection 25 g, 25 g, Intravenous, Q15 Min PRN, Isidra Mancuso MD  •  dextrose (GLUTOSE) oral gel 15 g, 15 g, Oral, Q15 Min PRN, Isidra Mancuso MD  •  furosemide (LASIX) tablet 40 mg, 40 mg, Oral, Daily, Jordan Vázquez MD, 40 mg at 12/25/22 1016  •  gabapentin (NEURONTIN) capsule 600 mg, 600 mg, Oral, BID, Isidra Mancuso MD, 600 mg at 12/25/22 1016  •  glucagon (human recombinant) (GLUCAGEN DIAGNOSTIC) injection 1 mg, 1 mg, Intramuscular, Q15 Min PRN, Isidra Mancuso MD  •  hydrALAZINE (APRESOLINE) tablet 50 mg, 50 mg, Oral, BID, Isidra Mancuso MD, 50 mg at 12/25/22 1016  •  HYDROcodone-acetaminophen (NORCO) 7.5-325 MG per tablet 1 tablet, 1 tablet, Oral, Q6H, Isidra Mancuso MD, 1 tablet at 12/25/22 1015  •  insulin glargine (LANTUS, SEMGLEE) injection 20 Units, 20 Units, Subcutaneous, Nightly, Isidra Mancuso MD, 20 Units at 12/24/22 2114  •  insulin lispro (ADMELOG) injection 0-9 Units, 0-9 Units, Subcutaneous, 4x Daily With Meals & Nightly, Rossy Gomes APRN, 4 Units at 12/24/22 2115  •  insulin lispro (ADMELOG) injection 10 Units, 10 Units, Subcutaneous, TID With Meals, Eran Balderrama MD, 10 Units at 12/25/22 1015  •   levothyroxine (SYNTHROID, LEVOTHROID) tablet 50 mcg, 50 mcg, Oral, Daily, Isidra Mancuso MD, 50 mcg at 12/25/22 1016  •  lidocaine (LIDODERM) 5 % 1 patch, 1 patch, Transdermal, Q24H, Isidra Mancuso MD, 1 patch at 12/24/22 2115  •  LORazepam (ATIVAN) tablet 0.5 mg, 0.5 mg, Oral, Q8H PRN, Isidra Mancuso MD, 0.5 mg at 12/24/22 2120  •  melatonin tablet 3 mg, 3 mg, Oral, Nightly PRN, Isidra Mancuso MD, 3 mg at 12/21/22 2053  •  methocarbamol (ROBAXIN) tablet 750 mg, 750 mg, Oral, Q8H PRN, Isidra Mancuso MD  •  montelukast (SINGULAIR) tablet 10 mg, 10 mg, Oral, Nightly, Isidra Mancuso MD, 10 mg at 12/24/22 2114  •  nitroglycerin (NITROSTAT) SL tablet 0.4 mg, 0.4 mg, Sublingual, Q5 Min PRN, Isidra Mancuso MD  •  ondansetron (ZOFRAN) tablet 4 mg, 4 mg, Oral, Q6H PRN **OR** ondansetron (ZOFRAN) injection 4 mg, 4 mg, Intravenous, Q6H PRN, Isidra Mancuso MD, 4 mg at 12/23/22 1950  •  pantoprazole (PROTONIX) EC tablet 40 mg, 40 mg, Oral, Q AM, Eran Balderrama MD, 40 mg at 12/25/22 0553  •  polyethylene glycol (MIRALAX) packet 17 g, 17 g, Oral, Daily, Isidra Mancuso MD, 17 g at 12/25/22 1015  •  predniSONE (DELTASONE) tablet 10 mg, 10 mg, Oral, Daily With Breakfast, Isidra Mancuso MD, 10 mg at 12/25/22 1016  •  sennosides-docusate (PERICOLACE) 8.6-50 MG per tablet 1 tablet, 1 tablet, Oral, Daily, Isidra Mancuso MD, 1 tablet at 12/25/22 1015  •  [COMPLETED] Insert Peripheral IV, , , Once **AND** sodium chloride 0.9 % flush 10 mL, 10 mL, Intravenous, PRN, Shikha Pozo APRN, 10 mL at 12/20/22 2111  •  tamsulosin (FLOMAX) 24 hr capsule 0.4 mg, 0.4 mg, Oral, Daily, Isidra Mancuso MD, 0.4 mg at 12/25/22 1016  Review of Systems:    The following systems were reviewed and negative;  constitution, respiratory, cardiovascular, musculoskeletal and neurological    Objective     Vital Signs  Temp:  [97.5 °F (36.4 °C)-97.9 °F  (36.6 °C)] 97.5 °F (36.4 °C)  Heart Rate:  [54-59] 59  Resp:  [16] 16  BP: (126-172)/(50-87) 172/70  Body mass index is 35.12 kg/m².    Intake/Output Summary (Last 24 hours) at 12/25/2022 1121  Last data filed at 12/25/2022 0900  Gross per 24 hour   Intake 320 ml   Output 1800 ml   Net -1480 ml     I/O this shift:  In: -   Out: 700 [Urine:700]     Physical Exam:   General: patient awake, alert and cooperative   Eyes: Normal lids and lashes, no scleral icterus   Neck: supple, normal ROM   Skin: warm and dry, not jaundiced   Cardiovascular: regular rhythm and rate, no murmurs auscultated   Pulm: clear to auscultation bilaterally, regular and unlabored   Abdomen: soft, nontender, nondistended; normal bowel sounds   Extremities: no rash or edema   Psychiatric: Normal mood and behavior; memory intact     Results Review:     I reviewed the patient's new clinical results.    Results from last 7 days   Lab Units 12/23/22  0703 12/21/22  0724 12/20/22  1207   WBC 10*3/mm3 6.27 6.92 8.96   HEMOGLOBIN g/dL 11.7* 12.4 12.9   HEMATOCRIT % 36.3 38.1 39.3   PLATELETS 10*3/mm3 55* 66* 69*     Results from last 7 days   Lab Units 12/24/22  0342 12/23/22  0703 12/22/22  0700 12/21/22  0724 12/20/22  1350   SODIUM mmol/L 138 139 140   < > 136   POTASSIUM mmol/L 4.1 4.1 3.8   < > 4.5   CHLORIDE mmol/L 101 101 100   < > 99   CO2 mmol/L 28.0 30.0* 29.8*   < > 26.8   BUN mg/dL 49* 47* 46*   < > 30*   CREATININE mg/dL 1.88* 1.98* 2.11*   < > 1.59*   CALCIUM mg/dL 8.7 8.7 8.8   < > 8.7   BILIRUBIN mg/dL  --   --   --   --  0.7   ALK PHOS U/L  --   --   --   --  72   ALT (SGPT) U/L  --   --   --   --  32   AST (SGOT) U/L  --   --   --   --  21   GLUCOSE mg/dL 175* 175* 206*   < > 325*    < > = values in this interval not displayed.         Lab Results   Lab Value Date/Time    LIPASE 26 08/24/2022 1453       Radiology:  FL Esophagram Complete Single Contrast   Final Result   1. There are 2 diverticula in the esophagus at the approximate  junction   of the middle and distal thirds.   2. Slight delayed clearance of barium from the esophagus with   intermittent incomplete distention of the distal-third of the esophagus.   3. There is mild gastroesophageal reflux.   4. No persistent esophageal strictures are seen.       FLUOROSCOPY TIME:  One minute 23 seconds.  One hundred two images.        This report was finalized on 12/23/2022 3:05 PM by Dr. Herbie Pike M.D.          XR Chest PA & Lateral   Final Result   Decreased bibasilar atelectasis or infiltrate. Small bilateral pleural   effusions       This report was finalized on 12/21/2022 3:32 PM by Dr. Oziel Darling M.D.          XR Chest 1 View   Final Result      XR Foot 3+ View Right   Final Result   1. Fracture of the proximal phalanx of the right great toe.           This report was finalized on 12/20/2022 12:32 PM by Dr. Herbie Pike M.D.              Assessment & Plan     Patient Active Problem List   Diagnosis   • Aortic valve stenosis   • Fatigue   • MI (mitral incompetence)   • PVC (premature ventricular contraction)   • Legally blind   • Essential hypertension   • Hypertriglyceridemia   • Pulmonary hypertension (HCC)   • Paroxysmal atrial fibrillation (HCC)   • Anxiety   • Stage 4 chronic kidney disease (HCC)   • Chronic pain disorder   • Degeneration of lumbar intervertebral disc   • Type 2 diabetes mellitus with hyperglycemia (HCC)   • Esophageal reflux   • Gastroparesis   • Hiatal hernia   • Iatrogenic hypothyroidism   • Macular degeneration   • Malignant neoplasm of uterus (HCC)   • Osteoarthritis of knee   • Peripheral nerve disease   • Secondary hyperparathyroidism (HCC)   • Thrombocytopenia (HCC)   • Chronic heart failure with preserved ejection fraction (HCC)   • Other cirrhosis of liver (HCC)   • Hypothyroidism   • Nonrheumatic tricuspid valve regurgitation   • Anemia, chronic disease   • Closed fracture of fifth lumbar vertebra (HCC)   • Closed fracture of fifth  lumbar vertebra, unspecified fracture morphology, initial encounter ([facility])   • Diabetic polyneuropathy associated with type 2 diabetes mellitus (HCC)   • Chronic ITP (idiopathic thrombocytopenia) (HCC)   • Chronic midline low back pain with bilateral sciatica   • Acute deep vein thrombosis of calf, bilateral (HCC)       Assessment/Recommendations:    Assessment:  1. Esophageal dysphagia: Suspect some component of dysmotility as well as anatomic issues including her esophageal diverticuli.  No obvious esophageal stricture noted on fluoroscopic examination.  2. History of gastroparesis with previous prokinetic use  3. Distant history of polyps  4. Chronic anemia  5. Possible cirrhosis of the liver  6. ITP  7. Thrombocytopenia  8. Diastolic congestive heart failure     Plan:  • Would encourage patient to sit upright and follow speech pathology recommendations regarding dietary intake.  • Have her f/u with me in the office. If trouble swallowing persists I would consider an EGD as an outpatient.     I discussed the patients findings and my recommendations with patient.    Freddy Martel MD

## 2022-12-26 NOTE — OUTREACH NOTE
Prep Survey    Flowsheet Row Responses   [facility] facility patient discharged from? [facility]   Is LACE score < 7 ? No   Eligibility Readm Mgmt   Discharge diagnosis A/C CHF, chronic ITP, liver cirrhosis, A-fib, CKD, T2DM, esophageal dysphagia   Does the patient have one of the following disease processes/diagnoses(primary or secondary)? CHF   Does the patient have Home health ordered? Yes   What is the Home health agency?  Current HH   Is there a DME ordered? No   Prep survey completed? Yes          LEISA Read Registered Nurse

## 2022-12-26 NOTE — CASE MANAGEMENT/SOCIAL WORK
Case Management Discharge Note      Final Note: home with family and current HH         Selected Continued Care - Discharged on 12/25/2022 Admission date: 12/20/2022 - Discharge disposition: [place]Health Care [facility] [place]    Destination    No services have been selected for the patient.              Durable Medical Equipment    No services have been selected for the patient.              Dialysis/Infusion    No services have been selected for the patient.              Home Medical Care     Service Provider Selected Services Address Phone Fax Patient Preferred    [facility] [address], [address], [place] [address] [phone] --          Therapy    No services have been selected for the patient.              Community Resources    No services have been selected for the patient.              Community & DME    No services have been selected for the patient.                Selected Continued Care - Prior Encounters Includes continued care and service providers with selected services from prior encounters from 9/21/2022 to 12/25/2022    Discharged on 10/20/2022 Admission date: 10/16/2022 - Discharge disposition: Home-Health Care [facility] [place]    Home Medical Care     Service Provider Selected Services Address Phone Fax Patient Preferred    [facility] [address], [address], [place] [address] [phone] --                    Transportation Services  Private: Car    Final Discharge Disposition Code: 06 - home with home health care

## 2022-12-28 ENCOUNTER — READMISSION MANAGEMENT (OUTPATIENT)
Dept: CALL CENTER | Facility: HOSPITAL | Age: 87
End: 2022-12-28

## 2022-12-28 ENCOUNTER — TELEPHONE (OUTPATIENT)
Dept: ONCOLOGY | Facility: CLINIC | Age: 87
End: 2022-12-28

## 2022-12-28 ENCOUNTER — OFFICE VISIT (OUTPATIENT)
Dept: CARDIOLOGY | Facility: CLINIC | Age: 87
End: 2022-12-28

## 2022-12-28 VITALS
SYSTOLIC BLOOD PRESSURE: 122 MMHG | WEIGHT: 156 LBS | OXYGEN SATURATION: 97 % | HEART RATE: 55 BPM | HEIGHT: 57 IN | BODY MASS INDEX: 33.66 KG/M2 | DIASTOLIC BLOOD PRESSURE: 50 MMHG

## 2022-12-28 DIAGNOSIS — I50.32 CHRONIC HEART FAILURE WITH PRESERVED EJECTION FRACTION: ICD-10-CM

## 2022-12-28 DIAGNOSIS — H54.8 LEGALLY BLIND: ICD-10-CM

## 2022-12-28 DIAGNOSIS — N18.4 STAGE 4 CHRONIC KIDNEY DISEASE: ICD-10-CM

## 2022-12-28 DIAGNOSIS — I34.0 NONRHEUMATIC MITRAL VALVE REGURGITATION: ICD-10-CM

## 2022-12-28 DIAGNOSIS — I27.20 PULMONARY HYPERTENSION: ICD-10-CM

## 2022-12-28 DIAGNOSIS — I10 ESSENTIAL HYPERTENSION: ICD-10-CM

## 2022-12-28 DIAGNOSIS — D69.6 THROMBOCYTOPENIA: ICD-10-CM

## 2022-12-28 DIAGNOSIS — I49.3 PVC (PREMATURE VENTRICULAR CONTRACTION): ICD-10-CM

## 2022-12-28 DIAGNOSIS — I35.0 NONRHEUMATIC AORTIC VALVE STENOSIS: Primary | ICD-10-CM

## 2022-12-28 PROCEDURE — 93000 ELECTROCARDIOGRAM COMPLETE: CPT | Performed by: NURSE PRACTITIONER

## 2022-12-28 PROCEDURE — 99214 OFFICE O/P EST MOD 30 MIN: CPT | Performed by: NURSE PRACTITIONER

## 2022-12-28 RX ORDER — GABAPENTIN 600 MG/1
600 TABLET ORAL 2 TIMES DAILY
Status: ON HOLD | COMMUNITY
Start: 2022-12-21 | End: 2023-03-14 | Stop reason: SDUPTHER

## 2022-12-28 RX ORDER — AMLODIPINE BESYLATE 5 MG/1
5 TABLET ORAL DAILY
Qty: 90 TABLET | Refills: 0 | Status: SHIPPED | OUTPATIENT
Start: 2022-12-28 | End: 2023-03-01

## 2022-12-28 RX ORDER — ONDANSETRON HYDROCHLORIDE 8 MG/1
8 TABLET, FILM COATED ORAL AS NEEDED
Status: ON HOLD | COMMUNITY
Start: 2022-12-26

## 2022-12-28 NOTE — TELEPHONE ENCOUNTER
Reviewed patient chart and returned call with the information that patient needs a CBC this week.  Last one done was on 12/23/2022.  She will do tomorrow.

## 2022-12-28 NOTE — PROGRESS NOTES
[facility]  [address]  [facility] [address]  [facility] [address]  Phone: [phone]      Patient Name: Helena Carmen  :[id number]  Age: 89 y.o.  Primary Cardiologist: Beth Butcher MD  Encounter Provider:  RIGO Olivera      Chief Complaint     Chief Complaint: Leg swelling    SUBJECTIVE     History of Present Illness:  Helena Carmen is a  89 y.o. 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  She is followed in our office by Dr. Butcher for aortic insufficiency and diastolic heart failure.     22 Follow-up:  She is here for hospital follow-up.  She was hospitalized in 2022 with COVID-pneumonia and also had bilateral DVTs; he was started on apixaban.  She then was hospitalized in 2022 for L5 compression fracture which was treated with a brace.  She then presented to [facility] on 2022 with volume overload and trouble swallowing.  She was treated with IV diuresis and had a swallow study; she is recommended to eat meals in an upright position.  She was discharged home on 40 mg Lasix daily and since that time she does not feel that she is urinating much.  Her weight has been stable and fluctuating between 154 and 156 pounds.  Home health and physical therapy are scheduled to start seeing her at home.  The family is also going to call palliative care.  Since discharge she feels fatigued and is sleeping more.  She denies chest pain, palpitations, or lightheadedness.  Her breathing is better since hospitalization.  She is taking her medications as prescribed.  She does have some mild ankle swelling.    Below is a summary of pertinent cardiology findings:  • She was originally referred for pericardial effusion noted on CT scan.  • Stress PET study in 2010 showed no ischemia.  Echocardiogram showed EF 67%, mild  concentric left ventricular hypertrophy, grade 1A diastolic dysfunction, moderate to marked left atrial enlargement, moderate aortic stenosis with peak gradient 42 mmHg and mean gradient 24 mmHg, mild mitral and tricuspid insufficiency, and small pericardial effusion.  • Echocardiogram March 2013 showed EF 58%, severe aortic stenosis with valvular area of 0.2 mm³ and peak gradient 71 mmHg with mean gradient 41 mmHg, moderate mitral insufficiency, severe left atrial enlargement, right ventricle systolic pressure 45 mmHg, and grade 2 diastolic dysfunction.  • Was referred to valve clinic and had cardiac catheterization after the March 2013 echocardiogram; cardiac cath showed minimal coronary artery disease.  On June 18, 2013 she had a 21 mm [place] Mitch trifecta bovine pericardial valve placed.  Post-operatively she had atrial fibrillation and some PVCs; Holter monitor showed no evidence of atrial fibrillation.  • 2D echocardiogram on August 1, 2003 showed EF 51%, mild to moderate left ventricular hypertrophy, grade 2 diastolic dysfunction, moderate to severe left atrial enlargement, mild to moderate mitral insufficiency, mild tricuspid insufficiency, and a tissue type aortic valve which was functioning well without stenosis or insufficiency.  This echo was done while she was hospitalized with C. difficile colitis and UTI.  • Echocardiogram on June 7, 2019 showed EF 56%, grade 2 diastolic dysfunction, mild LVH, left atrium moderately to severely dilated, moderate mitral insufficiency, moderate tricuspid insufficiency, RVSP at 69 mmHg, and normal functioning bioprosthetic aortic valve.  • Was seen for worsening dyspnea with exertion in December 2021.  Echocardiogram January 2022 showed LVEF 60%, diastolic dysfunction consistent with age, bioprosthetic aortic valve present, peak and mean gradients elevated, and mild tricuspid insufficiency.  • She was admitted March 2 through March 9, 2022 with acute on chronic diastolic  heart failure exacerbation and NADER: She was discharged home on 40 mg Lasix every other day and daily spironolactone.  She was discharged to [facility] and has been following with heart failure clinic.  • July 2022 echocardiogram showed EF 61 to 65%, mild concentric LV hypertrophy, grade 2 with high LAP LV diastolic dysfunction, moderately dilated left atrial cavity, bioprosthetic aortic valve present and functioning within defined limits, severe MAC with mild mitral valve regurgitation but no stenosis, mild to moderate tricuspid valve regurgitation, and RSVP 37 mmHg.  • She was hospitalized in August 2022 with COVID-pneumonia and also had bilateral DVTs; he was started on apixaban.  She then was hospitalized in October 2022 for L5 compression fracture which was treated with a brace.    • She then presented to [facility] on December 20, 2022 with volume overload and trouble swallowing.  She was treated with IV diuresis and had a swallow study; she is recommended to eat meals in an upright position.  She was discharged home on 40 mg Lasix daily.  There was concern about possible atrial fibrillation but EKGs all showed sinus rhythm with bigeminal PACs.    Past Medical History:   Diagnosis Date   • Aortic valve stenosis    • Back pain    • CKD (chronic kidney disease)    • Colitis due to Clostridioides difficile 01/26/2022   • Diastolic dysfunction    • Diverticulosis    • Exertional shortness of breath    • Heart disease    • Hiatal hernia    • Hyperlipidemia    • Hypertension    • Hyperthyroidism    • Hypertriglyceridemia 05/31/2018   • Hypothyroidism    • Left ventricular hypertrophy    • Legally blind    • Liver disease    • Macular degeneration    • Mitral regurgitation    • Osteoarthritis of hip    • Pancreatitis 01/26/2022   • Paroxysmal atrial fibrillation (HCC)    • Premature ventricular contractions    • Pulmonary hypertension (HCC)    • Renal insufficiency syndrome    • Type 2  diabetes mellitus (HCC)    • Uterine cancer (HCC)          Past Surgical History:   Procedure Laterality Date   • AORTIC VALVE REPAIR/REPLACEMENT     • CATARACT EXTRACTION      ,    • CHOLECYSTECTOMY     • ENDOSCOPY  08/15/2014    no gross lesions in stomach/duodenum, erythrematous mucosa in stomach   • HYSTERECTOMY     • STERNOTOMY           Social History     Socioeconomic History   • Marital status:    Tobacco Use   • Smoking status: Former     Packs/day: 0.50     Types: Cigarettes     Quit date: 7/10/1969     Years since quittin.5   • Smokeless tobacco: Never   Vaping Use   • Vaping Use: Never used   Substance and Sexual Activity   • Alcohol use: No     Comment: caffeine use - coffee 2 cups daily   • Drug use: No   • Sexual activity: Defer         Review of Systems     Review of Systems   Constitutional: Positive for malaise/fatigue.   Cardiovascular: Positive for dyspnea on exertion and leg swelling. Negative for chest pain, irregular heartbeat, near-syncope, orthopnea, palpitations and syncope.         Medications     Allergies as of 2022 - Reviewed 2022   Allergen Reaction Noted   • Erythromycin Unknown (See Comments) and Other (See Comments) 07/10/2014   • Statins Myalgia 2022   • Cephalexin Other (See Comments) and Unknown (See Comments) 10/21/2014   • Penicillins Rash 2015   • Sulfa antibiotics Itching and Rash 2015         Current Outpatient Medications:   •  carvedilol (COREG) 12.5 MG tablet, Take 1 tablet by mouth 2 (Two) Times a Day., Disp: 60 tablet, Rfl: 11  •  cetirizine (zyrTEC) 5 MG tablet, Take 1 tablet by mouth Daily As Needed for Allergies., Disp: , Rfl:   •  cholecalciferol (VITAMIN D3) 25 MCG (1000 UT) tablet, Take 1 tablet by mouth Daily., Disp: , Rfl:   •  furosemide (LASIX) 40 MG tablet, Take 1 tablet by mouth Daily for 30 days., Disp: 30 tablet, Rfl: 0  •  gabapentin (NEURONTIN) 600 MG tablet, 600 mg 2 (Two) Times a Day., Disp: , Rfl:    •  hydrALAZINE (APRESOLINE) 25 MG tablet, Take 2 tablets by mouth 2 (Two) Times a Day., Disp: , Rfl:   •  HYDROcodone-acetaminophen (NORCO) 7.5-325 MG per tablet, Take 1 tablet by mouth Every 6 (Six) Hours., Disp: , Rfl:   •  insulin glargine (LANTUS, SEMGLEE) 100 UNIT/ML injection, Inject 20 Units under the skin into the appropriate area as directed Every Night., Disp: , Rfl:   •  insulin lispro (humaLOG) 100 UNIT/ML injection, Inject 0-10 Units under the skin into the appropriate area as directed 3 (Three) Times a Day Before Meals. 2 units for every 50 > 150, Disp: , Rfl:   •  levothyroxine (SYNTHROID, LEVOTHROID) 25 MCG tablet, Take 50 mcg by mouth Daily., Disp: , Rfl:   •  lidocaine (LIDODERM) 5 %, Place 1 patch on the skin as directed by provider Daily. Remove & Discard patch within 12 hours or as directed by MD, Disp: 6 each, Rfl: 0  •  Loratadine (CLARITIN PO), Take  by mouth., Disp: , Rfl:   •  LORazepam (ATIVAN) 0.5 MG tablet, Take 1 tablet by mouth Every 8 (Eight) Hours As Needed for Anxiety., Disp: 10 tablet, Rfl: 0  •  methocarbamol (ROBAXIN) 750 MG tablet, Take 1 tablet by mouth Every 8 (Eight) Hours As Needed for Muscle Spasms., Disp: , Rfl:   •  montelukast (SINGULAIR) 10 MG tablet, Take 1 tablet by mouth Every Night., Disp: , Rfl:   •  ondansetron (ZOFRAN) 8 MG tablet, , Disp: , Rfl:   •  pantoprazole (PROTONIX) 40 MG EC tablet, Take 1 tablet by mouth Every Morning for 30 days., Disp: 30 tablet, Rfl: 0  •  polyethylene glycol (MIRALAX) 17 GM/SCOOP powder, Take 17 g by mouth Daily., Disp: , Rfl:   •  predniSONE (DELTASONE) 5 MG tablet, Take 2 tablets by mouth Daily With Breakfast., Disp: , Rfl:   •  sennosides-docusate (PERICOLACE) 8.6-50 MG per tablet, Take 1 tablet by mouth Daily., Disp: , Rfl:   •  tamsulosin (FLOMAX) 0.4 MG capsule 24 hr capsule, Take 0.4 mg by mouth every night at bedtime., Disp: , Rfl:   •  amLODIPine (NORVASC) 5 MG tablet, Take 1 tablet by mouth Daily., Disp: 90 tablet, Rfl:  "0        OBJECTIVE     Vital Signs:   /50 (BP Location: Right arm, Patient Position: Sitting)   Pulse 55   Ht 144.8 cm (57\")   Wt 70.8 kg (156 lb)   SpO2 97%   BMI 33.76 kg/m²       Weight:  Wt Readings from Last 3 Encounters:   22 70.8 kg (156 lb)   22 73.6 kg (162 lb 4.8 oz)   22 73.5 kg (162 lb)     Body mass index is 33.76 kg/m².        Physical Exam     Physical Exam  Constitutional:       General: She is not in acute distress.  HENT:      Head: Normocephalic and atraumatic.      Mouth/Throat:      Mouth: Mucous membranes are moist.   Eyes:      General: No scleral icterus.     Extraocular Movements: Extraocular movements intact.      Conjunctiva/sclera: Conjunctivae normal.      Pupils: Pupils are equal, round, and reactive to light.   Cardiovascular:      Rate and Rhythm: Normal rate and regular rhythm.      Pulses: Normal pulses.      Heart sounds: S1 normal and S2 normal. Murmur heard.   Pulmonary:      Effort: No respiratory distress.      Breath sounds: Normal breath sounds. No wheezing, rhonchi or rales.   Abdominal:      General: Bowel sounds are normal. There is no distension.      Palpations: Abdomen is soft.      Tenderness: There is no abdominal tenderness.   Musculoskeletal:         General: Normal range of motion.      Cervical back: Normal range of motion and neck supple.      Right lower le+ Pitting Edema present.      Left lower le+ Pitting Edema present.      Comments: In wheelchair   Skin:     General: Skin is warm and dry.      Coloration: Skin is not jaundiced.   Neurological:      Mental Status: She is alert and oriented to person, place, and time.   Psychiatric:         Mood and Affect: Mood normal.         Reviewed Data     Result Review :  The following data was reviewed by RIGO Olivera on 22:  • Labs on Tosin 15, 2022 showed creatinine 1.5, sodium 136, potassium 4.3, hemoglobin 11.9, platelet count 92.  · Lipid panel in " 6, 2022 showed total cholesterol 101, HDL 41, LDL 38, and triglycerides 124.  · Labs 12/24/2022: cr 1.9, K 4.1, otherwise unremarkable BMP        ECG 12 Lead    Date/Time: 12/28/2022 2:00 PM  Performed by: Sima Blum APRN  Authorized by: Sima Blum APRN   Comparison: compared with previous ECG from 9/27/2022  Similar to previous ECG  Rhythm: sinus rhythm  Ectopy: atrial premature contractions  Rate: normal  BPM: 55  Conduction: incomplete left bundle branch block  ST Elevation: V1 and V3  ST Depression: aVF, I, V4, V5 and V6    Clinical impression: abnormal EKG            Assessment and Plan        Assessment and Plan     Assessment:  1. Nonrheumatic aortic valve stenosis    2. Nonrheumatic mitral valve regurgitation    3. Chronic heart failure with preserved ejection fraction (HCC)    4. Pulmonary hypertension (HCC)    5. PVC (premature ventricular contraction)    6. Essential hypertension    7. Stage 4 chronic kidney disease (formerly [facility])    8. Thrombocytopenia (formerly [facility])    9. Legally blind         1. Aortic valve stenosis: June 18, 2013 she had 21 mm [place] Mitch trifecta bovine pericardial valve placed.  Echocardiogram January 2022 showed normal functioning bioprosthetic aortic valve but with mean and peak gradients elevated.  Valve was grossly normal on July 2022 echocardiogram.  2. Mitral valve insufficiency: Severe MAC noted on July 2022 echocardiogram with mild regurgitation but no stenosis.  3. Chronic diastolic heart failure: Left ventricular diastolic function noted to be consistent with age on echocardiogram January 2022.  4. 2022 echocardiogram showed grade 2 with high LAP LV diastolic dysfunction.  She was admitted in December 2022 with acute on chronic HFpEF.  She required IV diuresis and was discharged home on 40 mg Lasix.  She does not feel she is making much urine since hospital discharge 5 days ago.  5. Pulmonary hypertension: Severe on echocardiogram from June 2019, but mild  on July 2022 echocardiogram.  6. PVCs: These are chronic and stable she is on 12.5 mg metoprolol tartrate twice daily.  There was concern about possible A. fib during recent hospitalization but today's ECG shows sinus rhythm with bigeminal PACs.  7. Hypertension: Suspect her blood pressure is overcontrolled contributing to her fatigue and her decreased amount of diuresis.  8. CKD 3B: Renal function was stable while she was hospitalized in early June 2022 and then December 2022.  She follows with Dr. Art Wick.  9. Thrombocytopenia: Platelet count stable while hospitalized in June 2022.  She has followed with hematology.  10. 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      Plan:  Ms. Carmen is a patient of Dr. Butcher's with chronic HFpEF and aortic valve insufficiency status post aortic valve replacement.  Her last echocardiogram in July 2022 shows grossly normal functioning bioprosthetic aortic valve.  She was recently hospitalized with acute on chronic HFpEF requiring IV diuresis.  She was also discharged home on 40 mg Lasix daily but does not feel that her urine output is picked up.  Her weight is stable.  I suspect her blood pressure is overcontrolled.  I will decrease her amlodipine to 5 mg daily to see if this helps her fatigue and helps with diuresis.  She is scheduled to have labs drawn next Thursday which I will follow-up with.  I will see her back in the office in 2 weeks.        Follow Up:  Return in about 2 weeks (around 1/11/2023) for Follow-up with RIGO Rodney.  Orders Placed This Encounter   Procedures   • ECG 12 Lead          I appreciate the opportunity to participate in this patient's care.    Thank you,  RIGO Vallejo

## 2022-12-28 NOTE — TELEPHONE ENCOUNTER
Caller: JAMES    Relationship to patient: [facility]     Best call back number: [phone]    JAMES IS CALLING TO SEE IF PT NEEDS LABS DRAWN THIS WEEK SINCE SHE JUST GOT OUT OF THE HOSPITAL.

## 2022-12-28 NOTE — OUTREACH NOTE
CHF Week 1 Survey    Flowsheet Row Responses   [facility] patient discharged from? [place]   Does the patient have one of the following disease processes/diagnoses(primary or secondary)? CHF   CHF Week 1 attempt successful? Yes   Call start time 0840   Call end time 0843   Discharge diagnosis A/C CHF, chronic ITP, liver cirrhosis, A-fib, CKD, T2DM, esophageal dysphagia   Person spoke with today (if not patient) and relationship Radha daughter   Meds reviewed with patient/caregiver? Yes   Is the patient having any side effects they believe may be caused by any medication additions or changes? No   Does the patient have all medications ordered at discharge? Yes   Is the patient taking all medications as directed (includes completed medication regime)? Yes   Does the patient have a primary care provider?  Yes   Does the patient have an appointment with their PCP within 7 days of discharge? No   What is preventing the patient from scheduling follow up appointments within 7 days of discharge? Haven't had time   Nursing Interventions Advised patient to make appointment   Has the patient kept scheduled appointments due by today? N/A   Comments Has appt with cardiology today.  She is making appt with urology and hematology   What is the Home health agency?  Current HH   Has home health visited the patient within 72 hours of discharge? Yes   Pulse Ox monitoring Intermittent   Pulse Ox device source Patient   O2 Sat comments Checks every morning and sats are 96-97% on room air   O2 Sat: education provided Sat levels, Monitoring frequency, When to seek care   Psychosocial issues? No   What is the patient's perception of their health status since discharge? Improving   Is the patient able to teach back signs and symptoms of worsening condition? (i.e. weight gain, shortness of air, etc.) Yes   If the patient is a current smoker, are they able to teach back resources for cessation? Not a smoker   Is the patient/caregiver  "able to teach back the hierarchy of who to call/visit for symptoms/problems? PCP, Specialist, Home health nurse, Urgent Care, ED, 911 Yes   Additional teach back comments \"so far so good\".  Still weak but weight has been steady at 156lbs.  She will see her cardiologist today   CHF Zone this Call Green Zone   Green Zone No new or worsening shortness of breath, No new swelling -  feet, ankles and legs look normal for you, Weight check stable   Green Zone Interventions Daily weight check, Low sodium diet, Meds as directed, Follow up visits planned    CHF Week 1 call completed? Yes   Wrap up additional comments Denies questions or needs at this time.   Call end time 0843          MEHREEN NIELSON - Licensed Nurse  "

## 2023-01-01 ENCOUNTER — TELEPHONE (OUTPATIENT)
Dept: ONCOLOGY | Facility: CLINIC | Age: 88
End: 2023-01-01

## 2023-01-01 ENCOUNTER — TELEPHONE (OUTPATIENT)
Dept: CARDIOLOGY | Facility: CLINIC | Age: 88
End: 2023-01-01

## 2023-01-01 ENCOUNTER — PATIENT OUTREACH (OUTPATIENT)
Dept: CASE MANAGEMENT | Facility: OTHER | Age: 88
End: 2023-01-01
Payer: MEDICARE

## 2023-01-01 ENCOUNTER — APPOINTMENT (OUTPATIENT)
Dept: GENERAL RADIOLOGY | Facility: HOSPITAL | Age: 88
End: 2023-01-01
Payer: MEDICARE

## 2023-01-01 ENCOUNTER — READMISSION MANAGEMENT (OUTPATIENT)
Dept: CALL CENTER | Facility: HOSPITAL | Age: 88
End: 2023-01-01
Payer: MEDICARE

## 2023-01-01 ENCOUNTER — TELEPHONE (OUTPATIENT)
Dept: GASTROENTEROLOGY | Facility: CLINIC | Age: 88
End: 2023-01-01

## 2023-01-01 ENCOUNTER — HOSPITAL ENCOUNTER (EMERGENCY)
Facility: HOSPITAL | Age: 88
End: 2023-08-31
Attending: EMERGENCY MEDICINE
Payer: MEDICARE

## 2023-01-01 VITALS
OXYGEN SATURATION: 68 % | HEIGHT: 56 IN | HEART RATE: 107 BPM | BODY MASS INDEX: 41.61 KG/M2 | DIASTOLIC BLOOD PRESSURE: 37 MMHG | SYSTOLIC BLOOD PRESSURE: 59 MMHG | WEIGHT: 185 LBS | RESPIRATION RATE: 16 BRPM | TEMPERATURE: 97.6 F

## 2023-01-01 DIAGNOSIS — R00.1 BRADYCARDIA: ICD-10-CM

## 2023-01-01 DIAGNOSIS — I46.9 CARDIAC ARREST: Primary | ICD-10-CM

## 2023-01-01 DIAGNOSIS — R10.9 LEFT SIDED ABDOMINAL PAIN: ICD-10-CM

## 2023-01-01 LAB
QT INTERVAL: 580 MS
QTC INTERVAL: 627 MS

## 2023-01-01 PROCEDURE — 94761 N-INVAS EAR/PLS OXIMETRY MLT: CPT

## 2023-01-01 PROCEDURE — 25010000002 NALOXONE HCL 2 MG/2ML SOLUTION PREFILLED SYRINGE: Performed by: EMERGENCY MEDICINE

## 2023-01-01 PROCEDURE — 92950 HEART/LUNG RESUSCITATION CPR: CPT

## 2023-01-01 PROCEDURE — 96374 THER/PROPH/DIAG INJ IV PUSH: CPT

## 2023-01-01 PROCEDURE — 25010000002 HYDROMORPHONE PER 4 MG: Performed by: EMERGENCY MEDICINE

## 2023-01-01 PROCEDURE — 96365 THER/PROPH/DIAG IV INF INIT: CPT

## 2023-01-01 PROCEDURE — 96375 TX/PRO/DX INJ NEW DRUG ADDON: CPT

## 2023-01-01 PROCEDURE — 25010000002 ATROPINE SULFATE: Performed by: EMERGENCY MEDICINE

## 2023-01-01 PROCEDURE — 25010000002 SUCCINYLCHOLINE PER 20 MG: Performed by: EMERGENCY MEDICINE

## 2023-01-01 PROCEDURE — 96366 THER/PROPH/DIAG IV INF ADDON: CPT

## 2023-01-01 PROCEDURE — 94799 UNLISTED PULMONARY SVC/PX: CPT

## 2023-01-01 PROCEDURE — 93005 ELECTROCARDIOGRAM TRACING: CPT | Performed by: EMERGENCY MEDICINE

## 2023-01-01 PROCEDURE — 99285 EMERGENCY DEPT VISIT HI MDM: CPT

## 2023-01-01 PROCEDURE — 25010000002 ONDANSETRON PER 1 MG: Performed by: EMERGENCY MEDICINE

## 2023-01-01 PROCEDURE — 94002 VENT MGMT INPAT INIT DAY: CPT

## 2023-01-01 PROCEDURE — 96361 HYDRATE IV INFUSION ADD-ON: CPT

## 2023-01-01 PROCEDURE — 25010000002 EPINEPHRINE 1 MG/ML SOLUTION: Performed by: EMERGENCY MEDICINE

## 2023-01-01 PROCEDURE — 25010000002 EPINEPHRINE 1 MG/10ML SOLUTION PREFILLED SYRINGE: Performed by: EMERGENCY MEDICINE

## 2023-01-01 PROCEDURE — 31500 INSERT EMERGENCY AIRWAY: CPT

## 2023-01-01 PROCEDURE — 71045 X-RAY EXAM CHEST 1 VIEW: CPT

## 2023-01-01 RX ORDER — AMOXICILLIN 250 MG
2 CAPSULE ORAL 2 TIMES DAILY
Status: DISCONTINUED | OUTPATIENT
Start: 2023-01-01 | End: 2023-01-01 | Stop reason: HOSPADM

## 2023-01-01 RX ORDER — FUROSEMIDE 20 MG/1
20 TABLET ORAL NIGHTLY
COMMUNITY

## 2023-01-01 RX ORDER — SUCCINYLCHOLINE CHLORIDE 20 MG/ML
INJECTION INTRAMUSCULAR; INTRAVENOUS
Status: COMPLETED | OUTPATIENT
Start: 2023-01-01 | End: 2023-01-01

## 2023-01-01 RX ORDER — EPINEPHRINE IN SOD CHLOR,ISO 1 MG/10 ML
SYRINGE (ML) INTRAVENOUS
Status: COMPLETED | OUTPATIENT
Start: 2023-01-01 | End: 2023-01-01

## 2023-01-01 RX ORDER — ETOMIDATE 2 MG/ML
INJECTION INTRAVENOUS
Status: COMPLETED | OUTPATIENT
Start: 2023-01-01 | End: 2023-01-01

## 2023-01-01 RX ORDER — BISACODYL 5 MG/1
5 TABLET, DELAYED RELEASE ORAL DAILY PRN
Status: DISCONTINUED | OUTPATIENT
Start: 2023-01-01 | End: 2023-01-01 | Stop reason: HOSPADM

## 2023-01-01 RX ORDER — HYDROMORPHONE HYDROCHLORIDE 1 MG/ML
0.5 INJECTION, SOLUTION INTRAMUSCULAR; INTRAVENOUS; SUBCUTANEOUS ONCE
Status: COMPLETED | OUTPATIENT
Start: 2023-01-01 | End: 2023-01-01

## 2023-01-01 RX ORDER — HYDRALAZINE HYDROCHLORIDE 100 MG/1
50 TABLET, FILM COATED ORAL 3 TIMES DAILY
Qty: 60 TABLET | Refills: 11 | COMMUNITY
Start: 2023-01-01 | End: 2023-01-01 | Stop reason: HOSPADM

## 2023-01-01 RX ORDER — HYDROMORPHONE HYDROCHLORIDE 1 MG/ML
0.25 INJECTION, SOLUTION INTRAMUSCULAR; INTRAVENOUS; SUBCUTANEOUS
Status: DISCONTINUED | OUTPATIENT
Start: 2023-01-01 | End: 2023-01-01 | Stop reason: HOSPADM

## 2023-01-01 RX ORDER — LORATADINE 10 MG/1
10 TABLET ORAL DAILY
COMMUNITY

## 2023-01-01 RX ORDER — ONDANSETRON 2 MG/ML
8 INJECTION INTRAMUSCULAR; INTRAVENOUS ONCE
Status: COMPLETED | OUTPATIENT
Start: 2023-01-01 | End: 2023-01-01

## 2023-01-01 RX ORDER — FUROSEMIDE 40 MG/1
40 TABLET ORAL EVERY MORNING
COMMUNITY

## 2023-01-01 RX ORDER — PREDNISONE 5 MG/1
5 TABLET ORAL NIGHTLY
COMMUNITY

## 2023-01-01 RX ORDER — FAMOTIDINE 20 MG/1
20 TABLET, FILM COATED ORAL NIGHTLY
COMMUNITY

## 2023-01-01 RX ORDER — NOREPINEPHRINE BITARTRATE 0.03 MG/ML
.02-.3 INJECTION, SOLUTION INTRAVENOUS
Status: DISCONTINUED | OUTPATIENT
Start: 2023-01-01 | End: 2023-01-01 | Stop reason: HOSPADM

## 2023-01-01 RX ORDER — NALOXONE HYDROCHLORIDE 1 MG/ML
INJECTION INTRAMUSCULAR; INTRAVENOUS; SUBCUTANEOUS
Status: COMPLETED | OUTPATIENT
Start: 2023-01-01 | End: 2023-01-01

## 2023-01-01 RX ORDER — HYDRALAZINE HYDROCHLORIDE 25 MG/1
25 TABLET, FILM COATED ORAL EVERY MORNING
COMMUNITY

## 2023-01-01 RX ORDER — BISACODYL 10 MG
10 SUPPOSITORY, RECTAL RECTAL DAILY PRN
Status: DISCONTINUED | OUTPATIENT
Start: 2023-01-01 | End: 2023-01-01 | Stop reason: HOSPADM

## 2023-01-01 RX ORDER — MIDAZOLAM HYDROCHLORIDE 1 MG/ML
1-10 INJECTION, SOLUTION INTRAVENOUS
Status: DISCONTINUED | OUTPATIENT
Start: 2023-01-01 | End: 2023-01-01 | Stop reason: HOSPADM

## 2023-01-01 RX ORDER — FENTANYL CITRATE-0.9 % NACL/PF 10 MCG/ML
50-300 PLASTIC BAG, INJECTION (ML) INTRAVENOUS
Status: DISCONTINUED | OUTPATIENT
Start: 2023-01-01 | End: 2023-01-01 | Stop reason: HOSPADM

## 2023-01-01 RX ORDER — POLYETHYLENE GLYCOL 3350 17 G/17G
17 POWDER, FOR SOLUTION ORAL DAILY PRN
Status: DISCONTINUED | OUTPATIENT
Start: 2023-01-01 | End: 2023-01-01 | Stop reason: HOSPADM

## 2023-01-01 RX ORDER — AMOXICILLIN 250 MG
1 CAPSULE ORAL AS NEEDED
COMMUNITY

## 2023-01-01 RX ADMIN — EPINEPHRINE 1 MG: 0.1 INJECTION INTRACARDIAC; INTRAVENOUS at 13:17

## 2023-01-01 RX ADMIN — NALOXONE HYDROCHLORIDE 2 MG: 1 INJECTION PARENTERAL at 13:18

## 2023-01-01 RX ADMIN — ATROPINE SULFATE 1 MG: 0.1 INJECTION, SOLUTION INTRAVENOUS at 13:16

## 2023-01-01 RX ADMIN — SUCCINYLCHOLINE CHLORIDE 150 MG: 20 INJECTION, SOLUTION INTRAMUSCULAR; INTRAVENOUS; PARENTERAL at 13:13

## 2023-01-01 RX ADMIN — EPINEPHRINE 1 MG: 0.1 INJECTION INTRACARDIAC; INTRAVENOUS at 13:06

## 2023-01-01 RX ADMIN — SODIUM CHLORIDE 1000 ML: 9 INJECTION, SOLUTION INTRAVENOUS at 13:27

## 2023-01-01 RX ADMIN — ONDANSETRON 8 MG: 2 INJECTION INTRAMUSCULAR; INTRAVENOUS at 12:47

## 2023-01-01 RX ADMIN — SODIUM CHLORIDE 500 ML: 9 INJECTION, SOLUTION INTRAVENOUS at 12:48

## 2023-01-01 RX ADMIN — HYDROMORPHONE HYDROCHLORIDE 0.5 MG: 1 INJECTION, SOLUTION INTRAMUSCULAR; INTRAVENOUS; SUBCUTANEOUS at 12:46

## 2023-01-01 RX ADMIN — EPINEPHRINE 1 MG: 0.1 INJECTION INTRACARDIAC; INTRAVENOUS at 13:04

## 2023-01-01 RX ADMIN — Medication 0.3 MCG/KG/MIN: at 13:23

## 2023-01-01 RX ADMIN — ETOMIDATE 30 MG: 2 INJECTION, SOLUTION INTRAVENOUS at 13:13

## 2023-01-01 RX ADMIN — EPINEPHRINE 1 MG: 0.1 INJECTION INTRACARDIAC; INTRAVENOUS at 13:29

## 2023-01-05 ENCOUNTER — READMISSION MANAGEMENT (OUTPATIENT)
Dept: CALL CENTER | Facility: HOSPITAL | Age: 88
End: 2023-01-05
Payer: MEDICARE

## 2023-01-05 NOTE — OUTREACH NOTE
CHF Week 2 Survey    Flowsheet Row Responses   [facility] patient discharged from? [place]   Does the patient have one of the following disease processes/diagnoses(primary or secondary)? CHF   Week 2 attempt successful? Yes   Call start time 1759   Call end time 1800   Discharge diagnosis A/C CHF, chronic ITP, liver cirrhosis, A-fib, CKD, T2DM, esophageal dysphagia   Is patient permission given to speak with other caregiver? Yes   List who call center can speak with daughter- Radha   Person spoke with today (if not patient) and relationship daughter   Is the patient taking all medications as directed (includes completed medication regime)? Yes   Comments regarding appointments seen Dr. Butcher last week   Does the patient have a primary care provider?  Yes   Has the patient kept scheduled appointments due by today? Yes   What is the Home health agency?  Current    Psychosocial issues? No   What is the patient's perception of their health status since discharge? Returned to baseline/stable   Is the patient/caregiver able to teach back the hierarchy of who to call/visit for symptoms/problems? PCP, Specialist, Home health nurse, Urgent Care, ED, 911 Yes   CHF Zone this Call Green Zone   Green Zone Patient reports doing well   CHF Week 2 call completed? Yes   Wrap up additional comments Per daughter, patient is doing well, no questions.   Call end time 1800          KEVIN Lira Nurse

## 2023-01-10 NOTE — TELEPHONE ENCOUNTER
Ms Hoyos returned my call and she wanted to know what her Platelet count was from the 1/5/2022 lab draw.  Lab is in care everywhere and Platelet count was 59.  Her last Platelet count prior to this the Platelet count was 64.

## 2023-01-10 NOTE — TELEPHONE ENCOUNTER
Caller: Radha Hoyos    Relationship: Emergency Contact    Best call back number: [phone]    What is the best time to reach you: ANYTIME    Who are you requesting to speak with (clinical staff, provider,  specific staff member): DR DIXON OR NURSE    What was the call regarding: RADHA CALLING TO CHECK ON PTS 1/5 LAB RESULTS THAT SHE HAD WITH [facility]. PLEASE CALL TO ADVISE IF RESULTS RECVD OR NOT.     Do you require a callback: YES

## 2023-01-10 NOTE — TELEPHONE ENCOUNTER
Returned call to Ms Hoyos and verified her Prednisone dose is Prednisone 10 mg daily.  I contacted [facility] and spoke with manager, Enid Todd, phone number is [phone].  She will check into why we are not receiving the lab results and will fax the labs to our office.  Gave her the correct fax number to fax to our office.

## 2023-01-12 ENCOUNTER — READMISSION MANAGEMENT (OUTPATIENT)
Dept: CALL CENTER | Facility: HOSPITAL | Age: 88
End: 2023-01-12
Payer: MEDICARE

## 2023-01-12 NOTE — OUTREACH NOTE
CHF Week 3 Survey    Flowsheet Row Responses   [facility] patient discharged from? [place]   Does the patient have one of the following disease processes/diagnoses(primary or secondary)? CHF   Week 3 attempt successful? No   Unsuccessful attempts Attempt 1   Discharge diagnosis A/C CHF, chronic ITP, liver cirrhosis, A-fib, CKD, T2DM, esophageal dysphagia          KENTON OCONNOR - Registered Nurse

## 2023-01-17 ENCOUNTER — OFFICE VISIT (OUTPATIENT)
Dept: CARDIOLOGY | Facility: CLINIC | Age: 88
End: 2023-01-17
Payer: MEDICARE

## 2023-01-17 VITALS
SYSTOLIC BLOOD PRESSURE: 128 MMHG | OXYGEN SATURATION: 99 % | BODY MASS INDEX: 34.3 KG/M2 | HEART RATE: 56 BPM | DIASTOLIC BLOOD PRESSURE: 68 MMHG | WEIGHT: 159 LBS | HEIGHT: 57 IN

## 2023-01-17 DIAGNOSIS — I34.0 NONRHEUMATIC MITRAL VALVE REGURGITATION: ICD-10-CM

## 2023-01-17 DIAGNOSIS — I27.20 PULMONARY HYPERTENSION: ICD-10-CM

## 2023-01-17 DIAGNOSIS — I10 ESSENTIAL HYPERTENSION: ICD-10-CM

## 2023-01-17 DIAGNOSIS — H54.8 LEGALLY BLIND: ICD-10-CM

## 2023-01-17 DIAGNOSIS — I50.32 CHRONIC HEART FAILURE WITH PRESERVED EJECTION FRACTION: ICD-10-CM

## 2023-01-17 DIAGNOSIS — I49.3 PVC (PREMATURE VENTRICULAR CONTRACTION): ICD-10-CM

## 2023-01-17 DIAGNOSIS — N18.4 STAGE 4 CHRONIC KIDNEY DISEASE: ICD-10-CM

## 2023-01-17 DIAGNOSIS — I35.0 NONRHEUMATIC AORTIC VALVE STENOSIS: Primary | ICD-10-CM

## 2023-01-17 PROCEDURE — 93000 ELECTROCARDIOGRAM COMPLETE: CPT | Performed by: NURSE PRACTITIONER

## 2023-01-17 PROCEDURE — 99214 OFFICE O/P EST MOD 30 MIN: CPT | Performed by: NURSE PRACTITIONER

## 2023-01-17 NOTE — PROGRESS NOTES
[facility] CARDIOLOGY  [address]  [facility] [address]  Phone: [phone]  Fax: [phone]      Patient Name: Helena Carmen  :[id number]  Age: 89 y.o.  Primary Cardiologist: Beth Butcher MD  Encounter Provider:  RIGO Olivera      Chief Complaint     Chief Complaint: Leg swelling    SUBJECTIVE     History of Present Illness:  Helena Carmen is a  89 y.o. 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  She is followed in our office by Dr. Butcher for aortic insufficiency and diastolic heart failure.     23 Follow-up:  She is here for follow-up.  She continues to get in-home physical therapy but feels that she is still weak and is frustrated by this.  Her leg swelling is better today on 5 mg amlodipine daily; her daughter is with her today and somewhat concerned that her weight is fluctuating between 150 to 160 pounds.  Her breathing is comfortable and she denies orthopnea.  They report that her blood pressure at home has been 190s to 200s in the mornings.  They are unsure what blood pressure is in the afternoon; this is a difference compared to what we got in the office today.  She denies chest pain, palpitations, or syncope.  She is taking her medications as prescribed.    Below is a summary of pertinent cardiology findings:  • She was originally referred for pericardial effusion noted on CT scan.  • Stress PET study in 2010 showed no ischemia.  Echocardiogram showed EF 67%, mild concentric left ventricular hypertrophy, grade 1A diastolic dysfunction, moderate to marked left atrial enlargement, moderate aortic stenosis with peak gradient 42 mmHg and mean gradient 24 mmHg, mild mitral and tricuspid insufficiency, and small pericardial effusion.  • Echocardiogram 2013 showed EF 58%, severe aortic stenosis with valvular area  of 0.2 mm³ and peak gradient 71 mmHg with mean gradient 41 mmHg, moderate mitral insufficiency, severe left atrial enlargement, right ventricle systolic pressure 45 mmHg, and grade 2 diastolic dysfunction.  • Was referred to valve clinic and had cardiac catheterization after the March 2013 echocardiogram; cardiac cath showed minimal coronary artery disease.  On June 18, 2013 she had a 21 mm [place] Mitch trifecta bovine pericardial valve placed.  Post-operatively she had atrial fibrillation and some PVCs; Holter monitor showed no evidence of atrial fibrillation.  • 2D echocardiogram on August 1, 2003 showed EF 51%, mild to moderate left ventricular hypertrophy, grade 2 diastolic dysfunction, moderate to severe left atrial enlargement, mild to moderate mitral insufficiency, mild tricuspid insufficiency, and a tissue type aortic valve which was functioning well without stenosis or insufficiency.  This echo was done while she was hospitalized with C. difficile colitis and UTI.  • Echocardiogram on June 7, 2019 showed EF 56%, grade 2 diastolic dysfunction, mild LVH, left atrium moderately to severely dilated, moderate mitral insufficiency, moderate tricuspid insufficiency, RVSP at 69 mmHg, and normal functioning bioprosthetic aortic valve.  • Was seen for worsening dyspnea with exertion in December 2021.  Echocardiogram January 2022 showed LVEF 60%, diastolic dysfunction consistent with age, bioprosthetic aortic valve present, peak and mean gradients elevated, and mild tricuspid insufficiency.  • She was admitted March 2 through March 9, 2022 with acute on chronic diastolic heart failure exacerbation and NADER: She was discharged home on 40 mg Lasix every other day and daily spironolactone.  She was discharged to [facility] and has been following with heart failure clinic.  • July 2022 echocardiogram showed EF 61 to 65%, mild concentric LV hypertrophy, grade 2 with high LAP LV diastolic dysfunction,  moderately dilated left atrial cavity, bioprosthetic aortic valve present and functioning within defined limits, severe MAC with mild mitral valve regurgitation but no stenosis, mild to moderate tricuspid valve regurgitation, and RSVP 37 mmHg.  • She was hospitalized in August 2022 with COVID-pneumonia and also had bilateral DVTs; he was started on apixaban.  She then was hospitalized in October 2022 for L5 compression fracture which was treated with a brace.    • She then presented to [facility] on December 20, 2022 with volume overload and trouble swallowing.  She was treated with IV diuresis and had a swallow study; she is recommended to eat meals in an upright position.  She was discharged home on 40 mg Lasix daily.  There was concern about possible atrial fibrillation but EKGs all showed sinus rhythm with bigeminal PACs.    Past Medical History:   Diagnosis Date   • Aortic valve stenosis    • Back pain    • CKD (chronic kidney disease)    • Colitis due to Clostridioides difficile 01/26/2022   • Diastolic dysfunction    • Diverticulosis    • Exertional shortness of breath    • Heart disease    • Hiatal hernia    • Hyperlipidemia    • Hypertension    • Hyperthyroidism    • Hypertriglyceridemia 05/31/2018   • Hypothyroidism    • Left ventricular hypertrophy    • Legally blind    • Liver disease    • Macular degeneration    • Mitral regurgitation    • Osteoarthritis of hip    • Pancreatitis 01/26/2022   • Paroxysmal atrial fibrillation (HCC)    • Premature ventricular contractions    • Pulmonary hypertension (HCC)    • Renal insufficiency syndrome    • Type 2 diabetes mellitus (HCC)    • Uterine cancer (HCC)          Past Surgical History:   Procedure Laterality Date   • AORTIC VALVE REPAIR/REPLACEMENT     • CATARACT EXTRACTION      1970, 1999   • CHOLECYSTECTOMY     • ENDOSCOPY  08/15/2014    no gross lesions in stomach/duodenum, erythrematous mucosa in stomach   • HYSTERECTOMY  2007   • STERNOTOMY            Social History     Socioeconomic History   • Marital status:    Tobacco Use   • Smoking status: Former     Packs/day: 0.50     Types: Cigarettes     Quit date: 7/10/1969     Years since quittin.5   • Smokeless tobacco: Never   Vaping Use   • Vaping Use: Never used   Substance and Sexual Activity   • Alcohol use: No     Comment: caffeine use - coffee 2 cups daily   • Drug use: No   • Sexual activity: Defer         Review of Systems     Review of Systems   Constitutional: Positive for malaise/fatigue.   Cardiovascular: Positive for dyspnea on exertion and leg swelling. Negative for chest pain, irregular heartbeat, near-syncope, orthopnea, palpitations and syncope.   Musculoskeletal: Positive for muscle weakness.         Medications     Allergies as of 2023 - Reviewed 2023   Allergen Reaction Noted   • Erythromycin Unknown (See Comments) and Other (See Comments) 07/10/2014   • Statins Myalgia 2022   • Cephalexin Other (See Comments) and Unknown (See Comments) 10/21/2014   • Penicillins Rash 2015   • Sulfa antibiotics Itching and Rash 2015         Current Outpatient Medications:   •  amLODIPine (NORVASC) 5 MG tablet, Take 1 tablet by mouth Daily., Disp: 90 tablet, Rfl: 0  •  carvedilol (COREG) 12.5 MG tablet, Take 1 tablet by mouth 2 (Two) Times a Day., Disp: 60 tablet, Rfl: 11  •  cetirizine (zyrTEC) 5 MG tablet, Take 1 tablet by mouth Daily As Needed for Allergies., Disp: , Rfl:   •  cholecalciferol (VITAMIN D3) 25 MCG (1000 UT) tablet, Take 1 tablet by mouth Daily., Disp: , Rfl:   •  furosemide (LASIX) 40 MG tablet, Take 1 tablet by mouth Daily for 30 days., Disp: 30 tablet, Rfl: 0  •  gabapentin (NEURONTIN) 600 MG tablet, 600 mg 2 (Two) Times a Day., Disp: , Rfl:   •  hydrALAZINE (APRESOLINE) 25 MG tablet, Take 2 tablets by mouth 2 (Two) Times a Day., Disp: , Rfl:   •  HYDROcodone-acetaminophen (NORCO) 7.5-325 MG per tablet, Take 1 tablet by mouth Every 6 (Six)  "Hours., Disp: , Rfl:   •  insulin glargine (LANTUS, SEMGLEE) 100 UNIT/ML injection, Inject 20 Units under the skin into the appropriate area as directed Every Night., Disp: , Rfl:   •  insulin lispro (humaLOG) 100 UNIT/ML injection, Inject 0-10 Units under the skin into the appropriate area as directed 3 (Three) Times a Day Before Meals. 2 units for every 50 > 150, Disp: , Rfl:   •  levothyroxine (SYNTHROID, LEVOTHROID) 25 MCG tablet, Take 50 mcg by mouth Daily., Disp: , Rfl:   •  lidocaine (LIDODERM) 5 %, Place 1 patch on the skin as directed by provider Daily. Remove & Discard patch within 12 hours or as directed by MD, Disp: 6 each, Rfl: 0  •  Loratadine (CLARITIN PO), Take  by mouth., Disp: , Rfl:   •  LORazepam (ATIVAN) 0.5 MG tablet, Take 1 tablet by mouth Every 8 (Eight) Hours As Needed for Anxiety., Disp: 10 tablet, Rfl: 0  •  methocarbamol (ROBAXIN) 750 MG tablet, Take 1 tablet by mouth Every 8 (Eight) Hours As Needed for Muscle Spasms., Disp: , Rfl:   •  montelukast (SINGULAIR) 10 MG tablet, Take 1 tablet by mouth Every Night., Disp: , Rfl:   •  ondansetron (ZOFRAN) 8 MG tablet, , Disp: , Rfl:   •  pantoprazole (PROTONIX) 40 MG EC tablet, Take 1 tablet by mouth Every Morning for 30 days., Disp: 30 tablet, Rfl: 0  •  polyethylene glycol (MIRALAX) 17 GM/SCOOP powder, Take 17 g by mouth Daily., Disp: , Rfl:   •  predniSONE (DELTASONE) 5 MG tablet, Take 2 tablets by mouth Daily With Breakfast., Disp: , Rfl:   •  tamsulosin (FLOMAX) 0.4 MG capsule 24 hr capsule, Take 0.4 mg by mouth every night at bedtime., Disp: , Rfl:         OBJECTIVE     Vital Signs:   /68 (BP Location: Right arm, Patient Position: Sitting, Cuff Size: Adult)   Pulse 56   Ht 144.8 cm (57\")   Wt 72.1 kg (159 lb)   SpO2 99%   BMI 34.41 kg/m²       Weight:  Wt Readings from Last 3 Encounters:   01/17/23 72.1 kg (159 lb)   12/28/22 70.8 kg (156 lb)   12/25/22 73.6 kg (162 lb 4.8 oz)     Body mass index is 34.41 kg/m².        Physical " Exam     Physical Exam  Constitutional:       General: She is not in acute distress.  HENT:      Head: Normocephalic and atraumatic.      Mouth/Throat:      Mouth: Mucous membranes are moist.   Eyes:      General: No scleral icterus.     Extraocular Movements: Extraocular movements intact.      Conjunctiva/sclera: Conjunctivae normal.      Pupils: Pupils are equal, round, and reactive to light.   Cardiovascular:      Rate and Rhythm: Normal rate and regular rhythm.      Pulses: Normal pulses.      Heart sounds: S1 normal and S2 normal. Murmur heard.   Pulmonary:      Effort: No respiratory distress.      Breath sounds: Normal breath sounds. No wheezing, rhonchi or rales.   Abdominal:      General: Bowel sounds are normal. There is no distension.      Palpations: Abdomen is soft.      Tenderness: There is no abdominal tenderness.   Musculoskeletal:         General: Normal range of motion.      Cervical back: Normal range of motion and neck supple.      Right lower le+ Pitting Edema present.      Left lower le+ Pitting Edema present.      Comments: In wheelchair   Skin:     General: Skin is warm and dry.      Coloration: Skin is not jaundiced.   Neurological:      Mental Status: She is alert and oriented to person, place, and time.   Psychiatric:         Mood and Affect: Mood normal.         Reviewed Data     Result Review :  The following data was reviewed by RIGO Olivera on 23:  • Labs on Tosin 15, 2022 showed creatinine 1.5, sodium 136, potassium 4.3, hemoglobin 11.9, platelet count 92.  · Lipid panel in 2022 showed total cholesterol 101, HDL 41, LDL 38, and triglycerides 124.  · Labs 2022: cr 1.9, K 4.1, otherwise unremarkable BMP  · 23 no new labs to review.        ECG 12 Lead    Date/Time: 2023 2:55 PM  Performed by: Sima Blum APRN  Authorized by: Sima Blum APRN   Comparison: compared with previous ECG from  12/28/2022  Comparison to previous ECG: No PACs this EKG  Rhythm: sinus rhythm  Rate: normal  BPM: 55  Q waves: III, V3 and V2    ST Elevation: V1  ST Depression: II  T inversion: aVL  Other findings: left ventricular hypertrophy    Clinical impression: abnormal EKG            Assessment and Plan        Assessment and Plan     Assessment:  1. Nonrheumatic aortic valve stenosis    2. Nonrheumatic mitral valve regurgitation    3. Chronic heart failure with preserved ejection fraction (HCC)    4. Pulmonary hypertension (HCC)    5. PVC (premature ventricular contraction)    6. Essential hypertension    7. Stage 4 chronic kidney disease ([facility])    8. Legally blind         1. Aortic valve stenosis: June 18, 2013 she had 21 mm [place] Mitch trifecta bovine pericardial valve placed.  Echocardiogram January 2022 showed normal functioning bioprosthetic aortic valve but with mean and peak gradients elevated.  Valve was grossly normal on July 2022 echocardiogram. She appears compensated by exam today.  2. Mitral valve insufficiency: Severe MAC noted on July 2022 echocardiogram with mild regurgitation but no stenosis.  Leg swelling is stable.  3. Chronic diastolic heart failure: Left ventricular diastolic function noted to be consistent with age on echocardiogram January 2022 echocardiogram showed grade 2 with high LAP LV diastolic dysfunction.  She was admitted in December 2022 with acute on chronic HFpEF.  She required IV diuresis and was discharged home on 40 mg Lasix.  Leg swelling is better on lower dose amlodipine.  4. Pulmonary hypertension: Severe on echocardiogram from June 2019, but mild on July 2022 echocardiogram.  5. PVCs: These are chronic and stable she is on 12.5 mg metoprolol tartrate twice daily.  No ectopy on EKG today.  6. Hypertension: She reports elevated BP at home but BP is controlled in office today.  7. CKD 3B: Renal function was stable while she was hospitalized in early June 2022 and then December 2022.   She follows with Dr. Art Wick.  8. Thrombocytopenia: Platelet count stable while hospitalized in June 2022.  She has followed with hematology.  9. 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      Plan:  Ms. Carmen is a patient of Dr. Butcher's with chronic HFpEF and aortic valve insufficiency status post aortic valve replacement.  Her last echocardiogram in July 2022 shows grossly normal functioning bioprosthetic aortic valve.  She was recently hospitalized with acute on chronic HFpEF requiring IV diuresis.  She was also discharged home on 40 mg Lasix daily but does not feel that her urine output is picked up.  Her weight is stable.  I suspect her blood pressure is controlled; I will try to touch base with care tenders to see what readings they are getting.  I will also send them an order to check labs given her weakness.  I will have her see Dr. Butcher in 3 months.    01/19/23 addendum:  Our office contacted [facility]: Her blood pressure has been running 110s/70s to 140s/70s.  Her blood pressure is adequately controlled.  Labs will be drawn later this week.      Follow Up:  Return in about 3 months (around 4/17/2023) for Follow-up with Dr. Butcher.  Orders Placed This Encounter   Procedures   • Basic Metabolic Panel   • Magnesium   • proBNP   • Uric Acid   • ECG 12 Lead          I appreciate the opportunity to participate in this patient's care.    Thank you,  RIGO Vallejo

## 2023-01-18 ENCOUNTER — READMISSION MANAGEMENT (OUTPATIENT)
Dept: CALL CENTER | Facility: HOSPITAL | Age: 88
End: 2023-01-18
Payer: MEDICARE

## 2023-01-18 NOTE — OUTREACH NOTE
CHF Week 3 Survey    Flowsheet Row Responses   [facility] patient discharged from? [place]   Does the patient have one of the following disease processes/diagnoses(primary or secondary)? CHF   Week 3 attempt successful? Yes   Call start time 0930   Call end time 0947   Discharge diagnosis A/C CHF, chronic ITP, liver cirrhosis, A-fib, CKD, T2DM, esophageal dysphagia   Person spoke with today (if not patient) and relationship Radha, heatherr   Is the patient having any side effects they believe may be caused by any medication additions or changes? No   Is the patient taking all medications as directed (includes completed medication regime)? Yes   Does the patient have a primary care provider?  Yes   Does the patient have an appointment with their PCP within 7 days of discharge? No   Comments regarding PCP Dtr reports that pt will see another MD today 1/18/23 as unable to obtain an appt with PCP   Has the patient kept scheduled appointments due by today? Yes   What is the Home health agency?  [facility]   Has home health visited the patient within 72 hours of discharge? Yes   Psychosocial issues? No   Did the patient receive a copy of their discharge instructions? Yes   Nursing interventions Reviewed instructions with patient  [with dtr]   What is the patient's perception of their health status since discharge? Same  [Dtr reports that pt has ongoing issues with pain to hip and back, described as severe and aching--not controlled with narcotics or muscle relaxers. Dtr called ED, discussed obtaining XYs but  not reassured that they would be ordered if pt brought in.]   Nursing interventions Nurse provided patient education   Is the patient/caregiver able to teach back the hierarchy of who to call/visit for symptoms/problems? PCP, Specialist, Home health nurse, Urgent Care, ED, 911 Yes   Additional teach back comments Family unable to obtain an appt with their own PCP so are going to another MD today 1/18/23.  Dtr  voiced frustration over circumstances,  discussed [facility] and reports thought this was the plan at discharge but now need an order from MD .  It seems there was a referral for [facility] at discharge for new PCP appt also..   CHF Week 3 call completed? Yes   Revoked No further contact(revokes)-requires comment   Is the patient interested in additional calls from an ambulatory ?  NOTE:  applies to high risk patients requiring additional follow-up. Yes  [Call patient to discuss ACM--dtr will make patient aware to expect call to discuss services.  ]   What is the reason the patient needs support from an ACM? Caregiving/Support, Care Coordination  [Family would benefit from extra support--apparently there was some confusion at discharge with [facility], transition of care to [facility].  Family is experiencing some difficulty as noted with f/u appts,  pain not managed. ]   Wrap up additional comments Unable to discuss CHF diagnosis at time of call as main concern to family today.   Call end time [id number]          ALYSSA ROMERO - Registered Nurse

## 2023-01-19 ENCOUNTER — PATIENT OUTREACH (OUTPATIENT)
Dept: CASE MANAGEMENT | Facility: OTHER | Age: 88
End: 2023-01-19
Payer: MEDICARE

## 2023-01-19 NOTE — OUTREACH NOTE
AMBULATORY CASE MANAGEMENT NOTE    Name and Relationship of Patient/Support Person: Radha Hoyos - Emergency Contact    Patient Outreach  RN-ACM outreach with patient's daughter, Radha. Discussed 12/20/22 to 12/25/22 hospitalization regarding acute diastolic CHF. Daughter states patient discharged home; receiving [facility] services of PT and SN. Daughter states patient had outreach by [facility] regarding their services of establishing care with patient. Daughter states she would like patient to continue with current PCP Dr. Hickman and not change to [facility] PCP. Daughter states [facility] provided information regarding Palliative Care services and requesting further information. Daughter state patient is compliant with medications; insulin therapy; medical appointment; monitoring of O2 SAT;daily weight;  blood pressure and blood sugars. Daughter states blood sugars have been fluctuating in the morning between 80 -140 and night time blood sugar value of 241. Daughter states O2 SAT in the high 90 range of 99% O2 SAT. Daughter voiced intent to check blood pressure cuff accuracy with physician office. Daughter states patient has difficulty with pain to back; hip; knees and no difficulty with chest pain; SOB; appetite or sleeping. Reviewed with patient's daughter education;home health; palliative care services;  role of RN-ACM and HRCM case management services. Daughter verbalized understanding and states to appreciate patient outreach. Additional outreach scheduled.   Adult Patient Profile  Questions/Answers    Flowsheet Row Most Recent Value   Symptoms/Conditions Managed at Home cardiovascular, diabetes, type 2, musculoskeletal, HEENT (head, eyes, ears, nose, throat)   Cardiovascular Symptoms/Conditions hypertension, heart failure   Cardiovascular Management Strategies medication therapy, routine screening, other (see comments), weight management  [Physician  follow up]   Diabetes Management Strategies medication therapy, routine screenings, blood glucose testing, other (see comments), insulin therapy  [Physician follow up]   HEENT Symptoms/Conditions vision problem(s), other (see comments)  [Daughter states patient has macular degeneration and legally blind.]   HEENT Management Strategies other (see comments)  [Physician follow up]   Musculoskeletal Symptoms/Conditions back pain, joint pain, mobility limited, unsteady gait  [Pain to hip and knees]   Musculoskeletal Management Strategies other (see comments), medication therapy, routine screening  [Physician follow up]   Barriers to Taking Medication as Prescribed none  [Patient has assistance from family as needed.]   Equipment Currently Used at Home bp cuff, glucometer, pulse ox, rollator, grab bar, walker, rolling, shower chair   Primary Source of Support/Comfort child(edison)   Name of Support/Comfort Primary Source Daughter states patient has assistance from family with ADL's,  activities,  tranportation,  medications, medical appointments and family alternating staying with patient.   People in Home child(edison), adult   Name(s) of People in Home Family alternating in staying with patient and assisting patient as needed.        Social Work Assessment  Questions/Answers    Flowsheet Row Most Recent Value   People in Home child(edison), adult   Name(s) of People in Home Family alternating in staying with patient and assisting patient as needed.   Functional Status Comments Daughter (Radha) states to assist patient with ADL's,  meal preparation,  transportation and  ambulating with rollator   Equipment Currently Used at Home bp cuff, glucometer, pulse ox, rollator, grab bar, walker, rolling, shower chair        Send Education  Questions/Answers    Flowsheet Row Most Recent Value   Other Patient Education/Resources  24/7 [facility] Nurse Call Line, Advanced Care Planning, [facility]   24/7 Nurse Call Line Education Method  Verbal   ACP Education Method Verbal   MyChart Education Method Verbal   Advanced Directives: Patient Has      SDOH updated and reviewed with the patient during this program:  Financial Resource Strain: Low Risk    • Difficulty of Paying Living Expenses: Not hard at all      Food Insecurity: No Food Insecurity   • Worried About Running Out of Food in the Last Year: Never true   • Ran Out of Food in the Last Year: Never true      Transportation Needs: No Transportation Needs   • Lack of Transportation (Medical): No   • Lack of Transportation (Non-Medical): No     Education Documentation  unresolved or worsening symptoms, taught by Milla Doyle RN at 1/19/2023  1:51 PM.  Learner: Family  Readiness: Acceptance  Method: Explanation  Response: Verbalizes Understanding    back health, taught by Milla Doyle RN at 1/19/2023  1:51 PM.  Learner: Family  Readiness: Acceptance  Method: Explanation  Response: Verbalizes Understanding    Provider Follow-Up, taught by Milla Doyle RN at 1/19/2023  1:51 PM.  Learner: Family  Readiness: Acceptance  Method: Explanation  Response: Verbalizes Understanding    Blood Pressure Monitoring, taught by Milla Doyle RN at 1/19/2023  1:51 PM.  Learner: Family  Readiness: Acceptance  Method: Explanation  Response: Verbalizes Understanding    Follow-Up Care, taught by Milla Doyle RN at 1/19/2023  1:51 PM.  Learner: Family  Readiness: Acceptance  Method: Explanation  Response: Verbalizes Understanding    Blood Glucose Monitoring, taught by Milla Doyle RN at 1/19/2023  1:51 PM.  Learner: Family  Readiness: Acceptance  Method: Explanation  Response: Verbalizes Understanding    Unresolved/Worsening Symptoms, taught by Milla Doyle RN at 1/19/2023  1:51 PM.  Learner: Family  Readiness: Acceptance  Method: Explanation  Response: Verbalizes Understanding    Weight Monitoring, taught by Milla Doyle RN at 1/19/2023  1:51 PM.  Learner: Family  Readiness:  Acceptance  Method: Explanation  Response: Verbalizes Understanding    Provider Follow-Up, taught by Milla Doyle, RN at 1/19/2023  1:51 PM.  Learner: Family  Readiness: Acceptance  Method: Explanation  Response: Verbalizes Understanding          MILLA FIGUEROA  Ambulatory Case Management    1/19/2023, 13:52 EST

## 2023-01-20 ENCOUNTER — PATIENT OUTREACH (OUTPATIENT)
Dept: CASE MANAGEMENT | Facility: OTHER | Age: 88
End: 2023-01-20
Payer: MEDICARE

## 2023-01-20 NOTE — OUTREACH NOTE
AMBULATORY CASE MANAGEMENT NOTE    Name and Relationship of Patient/Support Person: Radha Hoyos - Emergency Contact    Patient Outreach  RN-ACM outreach with patient's daughter, Radha. Relayed information regarding services and contact information to daughter. Daughter verbalized understanding; states to appreciate RN-ACM assitance and voiced intent to contact [facility] regarding services. Reviewed with patient's daughter education; home health and Pallitus services. Daughter verbalized understanding and states to appreciate assistance. No further questions voiced at this time.   Care Coordination  Care Coordination with [facility] [phone] stating patient  is current receiving SN, PT services with start of care 9/27/22. Next SN visit scheduled for 1/23/23 and next PT visit scheduled for 1/24/23.   Care Coordination  Care Coordination with [facility] [phone] regarding services of palliative program for patients. Received information regarding referral process.     Education Documentation  sleep/rest, taught by Milla Doyle, RN at 1/20/2023  9:50 AM.  Learner: Family  Readiness: Acceptance  Method: Explanation  Response: Verbalizes Understanding    back health, taught by Milla Doyle, RN at 1/20/2023  9:50 AM.  Learner: Family  Readiness: Acceptance  Method: Explanation  Response: Verbalizes Understanding          MILLA FIGUEROA  Ambulatory Case Management    1/20/2023, 09:51 EST

## 2023-01-23 ENCOUNTER — TELEPHONE (OUTPATIENT)
Dept: ONCOLOGY | Facility: CLINIC | Age: 88
End: 2023-01-23
Payer: MEDICARE

## 2023-01-23 NOTE — TELEPHONE ENCOUNTER
Caller: RU    Relationship: [facility]    Best call back number: [phone]    Who are you requesting to speak with (clinical staff, provider,  specific staff member): CLINICAL    What was the call regarding: CALLING TO CLARIFY HOW LONG PATIENT IS TO RECEIVE WEEKLY LABS    Do you require a callback: YES

## 2023-01-23 NOTE — TELEPHONE ENCOUNTER
Reviewed patient chart and  Returned call to RU with [facility]. Advised that she has an appointment with Dr. Sherman and labs on 2/1/2023 and that should be a question that she should ask when she sees him.  She v/u.

## 2023-01-24 ENCOUNTER — TELEPHONE (OUTPATIENT)
Dept: CARDIOLOGY | Facility: CLINIC | Age: 88
End: 2023-01-24
Payer: MEDICARE

## 2023-01-24 NOTE — TELEPHONE ENCOUNTER
Left voicemail for Helena Carmen requesting callback.    Thank you,  Aparna Mcwilliams RN  Triage Nurse G

## 2023-01-24 NOTE — TELEPHONE ENCOUNTER
I received her labs drawn on 1/29/23: cr 1.8, K 4.0, Na 132, Uric acid 8.2, and proBNP 532.    I want her to increase lasix to 40 mg twice daily, 8 hours apart, on 1/25, 1/26, and 1/27.     I want to see her in the main office next week.     Thanks!  RIGO Rodney

## 2023-01-25 NOTE — TELEPHONE ENCOUNTER
Reviewed results and recommendations with Helena Garcia' daughter (ok per verbal RITA).  Radha verbalized understanding of results and recommendations, with repeat back of medication instructions.  Radha stated she wrote down medication instructions.    Radha stated she would prefer appointment be at the [facility] office in the event labs are needed as [facility] does not have a lab.  Appointment scheduled for next week.    Please let me know if there is anything else you would like me to do for this patient.    Thank you,  Aparna Mcwilliams RN  Triage Nurse [facility]

## 2023-01-30 NOTE — TELEPHONE ENCOUNTER
Phoned [facility], spoke with manager Enid. Explained to Enid that patient's visit with Dr. Sherman this week will be a telephone visit, and requested that another CBC be drawn prior to visit on Wednesday. Enid r/v verbal order for CBC.

## 2023-01-30 NOTE — TELEPHONE ENCOUNTER
Caller: ARAM SPAIN    Relationship: Emergency Contact    Best call back number: [phone]    What is the best time to reach you: ANYTIME    Who are you requesting to speak with (clinical staff, provider,  specific staff member): DR DIXON          What was the call regarding: WANTED TO SEE IF CAN RESCHEDULE GRANDMOTHERS APPOINTMENT 02/01 TO JUST A TELEPHONE VISIT  INSTEAD OF HER COMING IN DUE TO THE INCOMING WEATHER     Do you require a callback: YES

## 2023-02-01 ENCOUNTER — OFFICE VISIT (OUTPATIENT)
Dept: ONCOLOGY | Facility: CLINIC | Age: 88
End: 2023-02-01
Payer: MEDICARE

## 2023-02-01 DIAGNOSIS — D69.6 THROMBOCYTOPENIA: Primary | ICD-10-CM

## 2023-02-01 PROCEDURE — 99441 PR PHYS/QHP TELEPHONE EVALUATION 5-10 MIN: CPT | Performed by: INTERNAL MEDICINE

## 2023-02-03 ENCOUNTER — PATIENT OUTREACH (OUTPATIENT)
Dept: CASE MANAGEMENT | Facility: OTHER | Age: 88
End: 2023-02-03
Payer: MEDICARE

## 2023-02-03 NOTE — OUTREACH NOTE
AMBULATORY CASE MANAGEMENT NOTE    Name and Relationship of Patient/Support Person: Helena Carmen - Self  Patient Outreach  RN-ACM outreach with patient and daughter, Eddie. Patient states to be compliant medications; medical appointments; continues receiving home health services; monitoring blood pressure; blood sugars; O2 SAT and wearing brace to back as needed. Patient states blood pressure;  blood sugar and O2 SAT values WNL's and  today blood sugar was 113 Patient states to have episodes of difficulty sleeping; and no difficulty with chest pain; SOB or appetite. Patient states to have episodes of pain to buttocks; hips; right leg and knee and states to use brace as needed. Patient states to have blister to left great toe and voiced intent to discuss with home health nurse. Patient states to ambulate with walker and no difficulty with falls. Reviewed with patient and daughter education; resource of Nurse Navigator (Tanya Arellano/ [facility] [phone]) and home health services. Patient and daughter verbalized understanding and state to have appreciated patient outreach. No further questions voiced at this time.   Care Coordination  Care Coordination with [facility] [phone] stating patient current; receiving SN, PT services with next visit scheduled for 2/9/23.   Education Documentation  back health, taught by Milla Doyle RN at 2/3/2023 10:27 AM.  Learner: Family, Patient  Readiness: Acceptance  Method: Explanation  Response: Verbalizes Understanding    Blood Pressure Monitoring, taught by Milla Doyle RN at 2/3/2023 10:27 AM.  Learner: Family, Patient  Readiness: Acceptance  Method: Explanation  Response: Verbalizes Understanding    Follow-Up Care, taught by Milla Doyle RN at 2/3/2023 10:27 AM.  Learner: Family, Patient  Readiness: Acceptance  Method: Explanation  Response: Verbalizes Understanding    Blood Glucose Monitoring, taught by Vivek  DENISE Loyola at 2/3/2023 10:27 AM.  Learner: Family, Patient  Readiness: Acceptance  Method: Explanation  Response: Verbalizes Understanding    Safety, taught by Milla Doyle RN at 2/3/2023 10:27 AM.  Learner: Family, Patient  Readiness: Acceptance  Method: Explanation  Response: Verbalizes Understanding          MILLA FIGUEROA  Ambulatory Case Management    2/3/2023, 10:35 EST

## 2023-02-06 ENCOUNTER — TELEPHONE (OUTPATIENT)
Dept: ONCOLOGY | Facility: CLINIC | Age: 88
End: 2023-02-06
Payer: MEDICARE

## 2023-02-06 NOTE — TELEPHONE ENCOUNTER
Phoned [facility], spoke with Enid (manager). Per Dr. Sherman, gave verbal orders to continue weekly CBC and have results faxed to our office. Enid r/v orders.

## 2023-02-21 NOTE — OUTREACH NOTE
Prep Survey    Flowsheet Row Responses   Scientologist facility patient discharged from? [facility]   Is LACE score < 7 ? No   Emergency Room discharge w/ pulse ox? No   Eligibility Readm Mgmt   Discharge diagnosis Covid 19   Does the patient have one of the following disease processes/diagnoses(primary or secondary)? COVID-19   Does the patient have Home health ordered? Yes   What is the Home health agency?  [facility]   Is there a DME ordered? No   Prep survey completed? Yes          ERNESTO LEVINE - Registered Nurse         Oral Minoxidil Pregnancy And Lactation Text: This medication should only be used when clearly needed if you are pregnant, attempting to become pregnant or breast feeding.

## 2023-02-23 RX ORDER — PREDNISONE 1 MG/1
10 TABLET ORAL
Qty: 60 TABLET | Refills: 1 | Status: SHIPPED | OUTPATIENT
Start: 2023-02-23 | End: 2023-04-07 | Stop reason: HOSPADM

## 2023-02-23 NOTE — TELEPHONE ENCOUNTER
Returned call to family member with the information from Dr. Sherman.  Abdirashid is going to care for her till 3/23/2023, so patient will see Dr. Sherman on 3/21/2023 and will discuss a plan for her labs going forward at that time.  She thinks that if she continues to have a need for frequent labs to be drawn will discuss doing them at [facility] and having the results sent to our office.  It is too hard to get her in here every 2 week.s

## 2023-02-23 NOTE — TELEPHONE ENCOUNTER
I contacted [facility] to ask if they would continue to do her weekly labs after 3/23/2023.  Spoke with Enid who stated that Ms. Carmen has been on service with them since 9/2022 . Unfortunately, after 3/23/2023, they will unable to get her certified for continued lab draws weekly.  That is not a skilled service that they can continue to keep her on their service.  Patient has transportation and has no barriers to her coming into the office for her to have lab work done, so [facility] will be unable to keep her on service.  I will let Dr. Sherman know.

## 2023-02-23 NOTE — TELEPHONE ENCOUNTER
Caller: ARAM SPAIN    Relationship: Emergency Contact    Best call back number: [phone]      What was the call regarding: PATIENT WAS ADVISED THAT AS OF 3-23-23 SHE CAN NO LONGER GET HER WEEKLY LABS DONE AT HOME UNLESS DR DIXON CONTACTS [facility] # [phone].        Do you require a callback: YES

## 2023-02-23 NOTE — TELEPHONE ENCOUNTER
Caller: ARAM SPAIN    Relationship: Emergency Contact    Best call back number: [phone]    Requested Prescriptions:   Requested Prescriptions     Pending Prescriptions Disp Refills   • predniSONE (DELTASONE) 5 MG tablet       Sig: Take 2 tablets by mouth Daily With Breakfast.        Pharmacy where request should be sent: [id number] [place], [place] - [address] [facility] AT [facility] [phone] [facility] [phone] FX       Does the patient have less than a 3 day supply:  [x] Yes  [] No      Brittany Cesar Rep   02/23/23 10:56 EST

## 2023-03-01 ENCOUNTER — OFFICE VISIT (OUTPATIENT)
Dept: CARDIOLOGY | Facility: CLINIC | Age: 88
End: 2023-03-01
Payer: MEDICARE

## 2023-03-01 VITALS
SYSTOLIC BLOOD PRESSURE: 132 MMHG | WEIGHT: 162 LBS | HEIGHT: 57 IN | BODY MASS INDEX: 34.95 KG/M2 | DIASTOLIC BLOOD PRESSURE: 74 MMHG | HEART RATE: 62 BPM

## 2023-03-01 DIAGNOSIS — E11.65 TYPE 2 DIABETES MELLITUS WITH HYPERGLYCEMIA, WITH LONG-TERM CURRENT USE OF INSULIN: ICD-10-CM

## 2023-03-01 DIAGNOSIS — I35.0 NONRHEUMATIC AORTIC VALVE STENOSIS: Primary | ICD-10-CM

## 2023-03-01 DIAGNOSIS — N18.4 STAGE 4 CHRONIC KIDNEY DISEASE: ICD-10-CM

## 2023-03-01 DIAGNOSIS — I27.20 PULMONARY HYPERTENSION: ICD-10-CM

## 2023-03-01 DIAGNOSIS — H54.8 LEGALLY BLIND: ICD-10-CM

## 2023-03-01 DIAGNOSIS — I49.3 PVC (PREMATURE VENTRICULAR CONTRACTION): ICD-10-CM

## 2023-03-01 DIAGNOSIS — Z79.4 TYPE 2 DIABETES MELLITUS WITH HYPERGLYCEMIA, WITH LONG-TERM CURRENT USE OF INSULIN: ICD-10-CM

## 2023-03-01 DIAGNOSIS — D69.3 CHRONIC ITP (IDIOPATHIC THROMBOCYTOPENIA): ICD-10-CM

## 2023-03-01 DIAGNOSIS — I36.1 NONRHEUMATIC TRICUSPID VALVE REGURGITATION: ICD-10-CM

## 2023-03-01 DIAGNOSIS — I50.32 CHRONIC HEART FAILURE WITH PRESERVED EJECTION FRACTION: ICD-10-CM

## 2023-03-01 PROCEDURE — 99214 OFFICE O/P EST MOD 30 MIN: CPT | Performed by: NURSE PRACTITIONER

## 2023-03-01 PROCEDURE — 93000 ELECTROCARDIOGRAM COMPLETE: CPT | Performed by: NURSE PRACTITIONER

## 2023-03-01 RX ORDER — FLUTICASONE PROPIONATE AND SALMETEROL XINAFOATE 115; 21 UG/1; UG/1
2 AEROSOL, METERED RESPIRATORY (INHALATION)
Qty: 12 EACH | Refills: 11 | Status: SHIPPED | OUTPATIENT
Start: 2023-03-01

## 2023-03-01 RX ORDER — AMLODIPINE BESYLATE 2.5 MG/1
2.5 TABLET ORAL DAILY
Qty: 30 TABLET | Refills: 11 | Status: SHIPPED | OUTPATIENT
Start: 2023-03-01 | End: 2023-04-07 | Stop reason: HOSPADM

## 2023-03-01 NOTE — PROGRESS NOTES
[facility] CARDIOLOGY  [address]  [facility] [address]  [facility] [address]  Phone: [phone]      Patient Name: Helena Carmen  :[id number]  Age: 89 y.o.  Primary Cardiologist: Beth Butcher MD  Encounter Provider:  RIGO Olivera      Chief Complaint     Chief Complaint: Leg swelling    SUBJECTIVE     History of Present Illness:  Helena Carmen is a  89 y.o. 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  She is followed in our office by Dr. Butcher for aortic insufficiency and diastolic heart failure.     Follow-up:  She is here for 6-week follow-up.  She continues to slowly improve.  She is having more good days than bad.  Her legs still feel shaky which makes her feel unsafe; she is still getting in-home physical therapy.  Her blood pressure device reads higher than our reading; it has been controlled in office the past 2 times.  Her granddaughter reports that she often gets tired and sometimes lightheaded with showering.  She is getting Prolia injections every 6 months which is helping with her pain.  Her leg swelling is controlled on 40 mg Lasix daily.  She denies chest pain, worsening dyspnea, orthopnea, palpitations, or syncope.  She is taking her medications as prescribed.    Below is a summary of pertinent cardiology findings:  • She was originally referred for pericardial effusion noted on CT scan.  • Stress PET study in 2010 showed no ischemia.  Echocardiogram showed EF 67%, mild concentric left ventricular hypertrophy, grade 1A diastolic dysfunction, moderate to marked left atrial enlargement, moderate aortic stenosis with peak gradient 42 mmHg and mean gradient 24 mmHg, mild mitral and tricuspid insufficiency, and small pericardial effusion.  • Echocardiogram 2013 showed EF 58%, severe aortic stenosis with valvular area of 0.2 mm³ and peak  gradient 71 mmHg with mean gradient 41 mmHg, moderate mitral insufficiency, severe left atrial enlargement, right ventricle systolic pressure 45 mmHg, and grade 2 diastolic dysfunction.  • Was referred to valve clinic and had cardiac catheterization after the March 2013 echocardiogram; cardiac cath showed minimal coronary artery disease.  On June 18, 2013 she had a 21 mm [place] Mitch trifecta bovine pericardial valve placed.  Post-operatively she had atrial fibrillation and some PVCs; Holter monitor showed no evidence of atrial fibrillation.  • 2D echocardiogram on August 1, 2003 showed EF 51%, mild to moderate left ventricular hypertrophy, grade 2 diastolic dysfunction, moderate to severe left atrial enlargement, mild to moderate mitral insufficiency, mild tricuspid insufficiency, and a tissue type aortic valve which was functioning well without stenosis or insufficiency.  This echo was done while she was hospitalized with C. difficile colitis and UTI.  • Echocardiogram on June 7, 2019 showed EF 56%, grade 2 diastolic dysfunction, mild LVH, left atrium moderately to severely dilated, moderate mitral insufficiency, moderate tricuspid insufficiency, RVSP at 69 mmHg, and normal functioning bioprosthetic aortic valve.  • Was seen for worsening dyspnea with exertion in December 2021.  Echocardiogram January 2022 showed LVEF 60%, diastolic dysfunction consistent with age, bioprosthetic aortic valve present, peak and mean gradients elevated, and mild tricuspid insufficiency.  • She was admitted March 2 through March 9, 2022 with acute on chronic diastolic heart failure exacerbation and NADER: She was discharged home on 40 mg Lasix every other day and daily spironolactone.  She was discharged to [facility] and has been following with heart failure clinic.  • July 2022 echocardiogram showed EF 61 to 65%, mild concentric LV hypertrophy, grade 2 with high LAP LV diastolic dysfunction, moderately dilated left  atrial cavity, bioprosthetic aortic valve present and functioning within defined limits, severe MAC with mild mitral valve regurgitation but no stenosis, mild to moderate tricuspid valve regurgitation, and RSVP 37 mmHg.  • She was hospitalized in August 2022 with COVID-pneumonia and also had bilateral DVTs; he was started on apixaban.  She then was hospitalized in October 2022 for L5 compression fracture which was treated with a brace.    • She then presented to [facility] on December 20, 2022 with volume overload and trouble swallowing.  She was treated with IV diuresis and had a swallow study; she is recommended to eat meals in an upright position.  She was discharged home on 40 mg Lasix daily.  There was concern about possible atrial fibrillation but EKGs all showed sinus rhythm with bigeminal PACs.    Past Medical History:   Diagnosis Date   • Aortic valve stenosis    • Back pain    • CKD (chronic kidney disease)    • Colitis due to Clostridioides difficile 01/26/2022   • Diastolic dysfunction    • Diverticulosis    • Exertional shortness of breath    • Heart disease    • Hiatal hernia    • Hyperlipidemia    • Hypertension    • Hyperthyroidism    • Hypertriglyceridemia 05/31/2018   • Hypothyroidism    • Left ventricular hypertrophy    • Legally blind    • Liver disease    • Macular degeneration    • Mitral regurgitation    • Osteoarthritis of hip    • Pancreatitis 01/26/2022   • Paroxysmal atrial fibrillation (HCC)    • Premature ventricular contractions    • Pulmonary hypertension (HCC)    • Renal insufficiency syndrome    • Type 2 diabetes mellitus (HCC)    • Uterine cancer (HCC)          Past Surgical History:   Procedure Laterality Date   • AORTIC VALVE REPAIR/REPLACEMENT     • CATARACT EXTRACTION      1970, 1999   • CHOLECYSTECTOMY     • ENDOSCOPY  08/15/2014    no gross lesions in stomach/duodenum, erythrematous mucosa in stomach   • HYSTERECTOMY  2007   • STERNOTOMY           Social History      Socioeconomic History   • Marital status:    Tobacco Use   • Smoking status: Former     Packs/day: 0.50     Types: Cigarettes     Quit date: 7/10/1969     Years since quittin.6   • Smokeless tobacco: Never   Vaping Use   • Vaping Use: Never used   Substance and Sexual Activity   • Alcohol use: No     Comment: caffeine use - coffee 2 cups daily   • Drug use: No   • Sexual activity: Defer         Review of Systems     Review of Systems   Constitutional: Positive for malaise/fatigue.   Cardiovascular: Positive for dyspnea on exertion and leg swelling. Negative for chest pain, claudication, cyanosis, irregular heartbeat, near-syncope, orthopnea, palpitations, paroxysmal nocturnal dyspnea and syncope.   Musculoskeletal: Positive for muscle weakness.         Medications     Allergies as of 2023 - Reviewed 2023   Allergen Reaction Noted   • Erythromycin Unknown (See Comments) and Other (See Comments) 07/10/2014   • Statins Myalgia 2022   • Cephalexin Other (See Comments) and Unknown (See Comments) 10/21/2014   • Penicillins Rash 2015   • Sulfa antibiotics Itching and Rash 2015         Current Outpatient Medications:   •  amLODIPine (NORVASC) 2.5 MG tablet, Take 1 tablet by mouth Daily., Disp: 30 tablet, Rfl: 11  •  carvedilol (COREG) 12.5 MG tablet, Take 1 tablet by mouth 2 (Two) Times a Day., Disp: 60 tablet, Rfl: 11  •  cetirizine (zyrTEC) 5 MG tablet, Take 1 tablet by mouth Daily As Needed for Allergies., Disp: , Rfl:   •  cholecalciferol (VITAMIN D3) 25 MCG (1000 UT) tablet, Take 1 tablet by mouth Daily., Disp: , Rfl:   •  fluticasone-salmeterol (Advair HFA) 115-21 MCG/ACT inhaler, Inhale 2 puffs 2 (Two) Times a Day., Disp: 12 each, Rfl: 11  •  furosemide (LASIX) 40 MG tablet, Take 1 tablet by mouth Daily for 30 days., Disp: 30 tablet, Rfl: 0  •  gabapentin (NEURONTIN) 600 MG tablet, 600 mg 2 (Two) Times a Day., Disp: , Rfl:   •  hydrALAZINE (APRESOLINE) 25 MG tablet, Take  "2 tablets by mouth 2 (Two) Times a Day., Disp: , Rfl:   •  HYDROcodone-acetaminophen (NORCO) 7.5-325 MG per tablet, Take 1 tablet by mouth Every 6 (Six) Hours., Disp: , Rfl:   •  insulin glargine (LANTUS, SEMGLEE) 100 UNIT/ML injection, Inject 20 Units under the skin into the appropriate area as directed Every Night., Disp: , Rfl:   •  insulin lispro (humaLOG) 100 UNIT/ML injection, Inject 0-10 Units under the skin into the appropriate area as directed 3 (Three) Times a Day Before Meals. 2 units for every 50 > 150, Disp: , Rfl:   •  levothyroxine (SYNTHROID, LEVOTHROID) 25 MCG tablet, Take 50 mcg by mouth Daily., Disp: , Rfl:   •  lidocaine (LIDODERM) 5 %, Place 1 patch on the skin as directed by provider Daily. Remove & Discard patch within 12 hours or as directed by MD, Disp: 6 each, Rfl: 0  •  Loratadine (CLARITIN PO), Take  by mouth., Disp: , Rfl:   •  LORazepam (ATIVAN) 0.5 MG tablet, Take 1 tablet by mouth Every 8 (Eight) Hours As Needed for Anxiety., Disp: 10 tablet, Rfl: 0  •  methocarbamol (ROBAXIN) 750 MG tablet, Take 1 tablet by mouth Every 8 (Eight) Hours As Needed for Muscle Spasms., Disp: , Rfl:   •  montelukast (SINGULAIR) 10 MG tablet, Take 1 tablet by mouth Every Night., Disp: , Rfl:   •  ondansetron (ZOFRAN) 8 MG tablet, , Disp: , Rfl:   •  polyethylene glycol (MIRALAX) 17 GM/SCOOP powder, Take 17 g by mouth Daily., Disp: , Rfl:   •  predniSONE (DELTASONE) 5 MG tablet, Take 2 tablets by mouth Daily With Breakfast., Disp: 60 tablet, Rfl: 1  •  tamsulosin (FLOMAX) 0.4 MG capsule 24 hr capsule, Take 0.4 mg by mouth every night at bedtime., Disp: , Rfl:         OBJECTIVE     Vital Signs:   /74   Pulse 62   Ht 144.8 cm (57\")   Wt 73.5 kg (162 lb)   BMI 35.06 kg/m²       Weight:  Wt Readings from Last 3 Encounters:   03/01/23 73.5 kg (162 lb)   01/17/23 72.1 kg (159 lb)   12/28/22 70.8 kg (156 lb)     Body mass index is 35.06 kg/m².        Physical Exam     Physical Exam  Constitutional:       " General: She is not in acute distress.  HENT:      Head: Normocephalic and atraumatic.      Mouth/Throat:      Mouth: Mucous membranes are moist.   Eyes:      General: No scleral icterus.     Extraocular Movements: Extraocular movements intact.      Conjunctiva/sclera: Conjunctivae normal.      Pupils: Pupils are equal, round, and reactive to light.   Cardiovascular:      Rate and Rhythm: Normal rate and regular rhythm.      Pulses: Normal pulses.      Heart sounds: S1 normal and S2 normal. Murmur heard.   Pulmonary:      Effort: No respiratory distress.      Breath sounds: Normal breath sounds. No wheezing, rhonchi or rales.   Abdominal:      General: Bowel sounds are normal. There is no distension.      Palpations: Abdomen is soft.      Tenderness: There is no abdominal tenderness.   Musculoskeletal:         General: Normal range of motion.      Cervical back: Normal range of motion and neck supple.      Right lower le+ Pitting Edema present.      Left lower le+ Pitting Edema present.      Comments: In wheelchair   Skin:     General: Skin is warm and dry.      Coloration: Skin is not jaundiced.   Neurological:      Mental Status: She is alert and oriented to person, place, and time.   Psychiatric:         Mood and Affect: Mood normal.         Reviewed Data     Result Review :  The following data was reviewed by RIGO Olivera on 23:  • Labs on Tosin 15, 2022 showed creatinine 1.5, sodium 136, potassium 4.3, hemoglobin 11.9, platelet count 92.  · Lipid panel in 2022 showed total cholesterol 101, HDL 41, LDL 38, and triglycerides 124.  · Labs 2022: cr 1.9, K 4.1, otherwise unremarkable BMP  · 2023: cr 1.7, K 3.8, , otherwise unremarkable CMP, Hgb 12.8, PLT 42        ECG 12 Lead    Date/Time: 3/1/2023 2:55 PM  Performed by: Sima Blum APRN  Authorized by: Sima Blum APRN   Comparison: compared with previous ECG from  1/17/2023  Similar to previous ECG  Ectopy: atrial premature contractions  Q waves: V2    ST Elevation: aVR  ST Depression: III, II and aVF    Clinical impression: abnormal EKG            Assessment and Plan        Assessment and Plan     Assessment:  1. Nonrheumatic aortic valve stenosis    2. Nonrheumatic tricuspid valve regurgitation    3. Chronic heart failure with preserved ejection fraction (HCC)    4. Pulmonary hypertension ([facility])    5. PVC (premature ventricular contraction)    6. Chronic ITP (idiopathic thrombocytopenia) ([facility])    7. Stage 4 chronic kidney disease ([facility])    8. Type 2 diabetes mellitus with hyperglycemia, with long-term current use of insulin ([facility])    9. Legally blind         1. Aortic valve stenosis: June 18, 2013 she had 21 mm [place] Mitch trifecta bovine pericardial valve placed.  Echocardiogram January 2022 showed normal functioning bioprosthetic aortic valve but with mean and peak gradients elevated.  Valve was grossly normal on July 2022 echocardiogram. She appears compensated by exam today, and is slowly improving.  2. Mitral valve insufficiency: Severe MAC noted on July 2022 echocardiogram with mild regurgitation but no stenosis.  Leg swelling is stable to better.  3. Chronic diastolic heart failure: Left ventricular diastolic function noted to be consistent with age on echocardiogram January 2022 echocardiogram showed grade 2 with high LAP LV diastolic dysfunction.  She was admitted in December 2022 with acute on chronic HFpEF.  She required IV diuresis and was discharged home on 40 mg Lasix.  Leg swelling is better on lower dose amlodipine.  Of note she was not able to tolerate Jardiance or Farxiga due to UTI.  Spironolactone was also stopped due to NADER.  4. Pulmonary hypertension: Severe on echocardiogram from June 2019, but mild on July 2022 echocardiogram.  Her blood pressure is controlled in office.  5. PVCs: These are chronic and stable she is on 12.5 mg metoprolol tartrate twice  daily.  No PVCs noted but PACs noted on EKG today.  6. Hypertension: She reports elevated BP at home but BP is controlled in office, again, today.  At last visit, BP was confirmed to be controlled by home health.  7. CKD 3B: Renal function was stable while she was hospitalized in early June 2022 and then December 2022.  She follows with Dr. Art Wick.  She had NADER with spironolactone.  8. Thrombocytopenia: Platelet count is trending down again.  She has followed with hematology.  9. 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      Plan:  Ms. Carmen is a patient of Dr. Butcher's with chronic HFpEF and aortic valve insufficiency status post aortic valve replacement.  Her last echocardiogram in July 2022 shows grossly normal functioning bioprosthetic aortic valve.  She continues to improve and I am going to make no medication changes today.  I am going to have her follow-up with Dr. Butcher in April 2023.    Follow Up:  No follow-ups on file.  Orders Placed This Encounter   Procedures   • ECG 12 Lead          I appreciate the opportunity to participate in this patient's care.    Thank you,  RIGO Rodney

## 2023-03-06 ENCOUNTER — TELEPHONE (OUTPATIENT)
Dept: ONCOLOGY | Facility: CLINIC | Age: 88
End: 2023-03-06
Payer: MEDICARE

## 2023-03-06 NOTE — TELEPHONE ENCOUNTER
Caller: ARAM SPAIN    Relationship: Emergency Contact    Best call back number: [phone]    What is the best time to reach you: ASAP    Who are you requesting to speak with (clinical staff, provider,  specific staff member): CLINICAL    What was the call regarding: PT'S GRANDDAUGHTER IS CALLING TO FIND OUT RESULTS/PLATELET COUNT FROM LAST WEEK.  THE PT IS ALSO HAVING A LITTLE TROUBLE BREATHING RIGHT NOW, JUST STARTED TODAY, PLEASE ADVISE ON THIS AS WELL, DOES SHE NEED TO CALL THE PCP?    Do you require a callback: YES

## 2023-03-06 NOTE — TELEPHONE ENCOUNTER
Returned call to family member with the information that there is currently no change in her Prednisone dose at this time.  Dr. Sherman needs the Platelet results no later than Friday, because her Prednisone dose will need to be discussed.  If she has any nose bleeds, blood in her urine or stool, she should call the office back.  She v/u.

## 2023-03-06 NOTE — TELEPHONE ENCOUNTER
Returned call to family member and patient is currently taking Prednisone 10 mg daily.  She is having no other bleeding complications other than increased bruising at this time.

## 2023-03-06 NOTE — TELEPHONE ENCOUNTER
"Returned call to daughter, who is upset that \"You all are not doing anything about her critically low platelet count\".  I read her the same information that Dr. Sherman had given the family member earlier in the day.  I also explained that her platelet count was not critically low yet.  I again reviewed symptoms that Ms Carmen is having and she has increased bruising, but no blood in her urine or stool and no nosebleeds.  Daughter was still upset and stated that patient is weak and shaky and she just wasn't going to sit there and watch her get worse.  She stated that she may have to just take her to the ER.  I advised her that if she is concerned and there are no other symptoms to report that she may have to take her to the ER to be evaluated for the weakness and shakiness.  She v/u.  And while hanging up the phone she stated, \"They are not going to do a damn thing about this\".    "

## 2023-03-06 NOTE — TELEPHONE ENCOUNTER
Returned call to family member who is reporting that she is bruising more and was wondering what her platelet count was from last week.  I reviewed in [facility] Everywhere and her Platelets were 38.  Family member is also reporting that she had some Shortness of breath this am, which improved after using her Inhaler, but she has been weak and shaky, not sure what else to do at this time.

## 2023-03-07 ENCOUNTER — APPOINTMENT (OUTPATIENT)
Dept: GENERAL RADIOLOGY | Facility: HOSPITAL | Age: 88
DRG: 551 | End: 2023-03-07
Payer: MEDICARE

## 2023-03-07 ENCOUNTER — HOSPITAL ENCOUNTER (INPATIENT)
Facility: HOSPITAL | Age: 88
LOS: 5 days | Discharge: NURSING, REHAB OR LONG-TERM CARE | DRG: 551 | End: 2023-03-14
Attending: EMERGENCY MEDICINE | Admitting: INTERNAL MEDICINE
Payer: MEDICARE

## 2023-03-07 ENCOUNTER — APPOINTMENT (OUTPATIENT)
Dept: MRI IMAGING | Facility: HOSPITAL | Age: 88
DRG: 551 | End: 2023-03-07
Payer: MEDICARE

## 2023-03-07 ENCOUNTER — TELEPHONE (OUTPATIENT)
Dept: ONCOLOGY | Facility: CLINIC | Age: 88
End: 2023-03-07
Payer: MEDICARE

## 2023-03-07 DIAGNOSIS — G62.9 PERIPHERAL NERVE DISEASE: ICD-10-CM

## 2023-03-07 DIAGNOSIS — R26.2 UNABLE TO WALK: ICD-10-CM

## 2023-03-07 DIAGNOSIS — D69.6 THROMBOCYTOPENIA: ICD-10-CM

## 2023-03-07 DIAGNOSIS — R53.1 GENERALIZED WEAKNESS: ICD-10-CM

## 2023-03-07 DIAGNOSIS — E11.42 DIABETIC POLYNEUROPATHY ASSOCIATED WITH TYPE 2 DIABETES MELLITUS: ICD-10-CM

## 2023-03-07 DIAGNOSIS — R77.8 ELEVATED TROPONIN: ICD-10-CM

## 2023-03-07 DIAGNOSIS — Z86.2 HISTORY OF ITP: ICD-10-CM

## 2023-03-07 DIAGNOSIS — M54.50 BILATERAL LOW BACK PAIN WITHOUT SCIATICA, UNSPECIFIED CHRONICITY: Primary | ICD-10-CM

## 2023-03-07 DIAGNOSIS — N18.9 CHRONIC KIDNEY DISEASE, UNSPECIFIED CKD STAGE: ICD-10-CM

## 2023-03-07 DIAGNOSIS — S32.050G CLOSED COMPRESSION FRACTURE OF L5 LUMBAR VERTEBRA, WITH DELAYED HEALING, SUBSEQUENT ENCOUNTER: ICD-10-CM

## 2023-03-07 PROBLEM — M54.42 ACUTE LEFT-SIDED LOW BACK PAIN WITH LEFT-SIDED SCIATICA: Status: ACTIVE | Noted: 2023-03-07

## 2023-03-07 LAB
ALBUMIN SERPL-MCNC: 4.1 G/DL (ref 3.5–5.2)
ALBUMIN/GLOB SERPL: 1.7 G/DL
ALP SERPL-CCNC: 58 U/L (ref 39–117)
ALT SERPL W P-5'-P-CCNC: 19 U/L (ref 1–33)
ANION GAP SERPL CALCULATED.3IONS-SCNC: 9.9 MMOL/L (ref 5–15)
AST SERPL-CCNC: 15 U/L (ref 1–32)
B PARAPERT DNA SPEC QL NAA+PROBE: NOT DETECTED
B PERT DNA SPEC QL NAA+PROBE: NOT DETECTED
BILIRUB SERPL-MCNC: 0.5 MG/DL (ref 0–1.2)
BILIRUB UR QL STRIP: NEGATIVE
BUN SERPL-MCNC: 31 MG/DL (ref 8–23)
BUN/CREAT SERPL: 20.7 (ref 7–25)
C PNEUM DNA NPH QL NAA+NON-PROBE: NOT DETECTED
CALCIUM SPEC-SCNC: 8.8 MG/DL (ref 8.2–9.6)
CHLORIDE SERPL-SCNC: 102 MMOL/L (ref 98–107)
CLARITY UR: CLEAR
CO2 SERPL-SCNC: 25.1 MMOL/L (ref 22–29)
COLOR UR: YELLOW
CREAT SERPL-MCNC: 1.5 MG/DL (ref 0.57–1)
DEPRECATED RDW RBC AUTO: 49.6 FL (ref 37–54)
EGFRCR SERPLBLD CKD-EPI 2021: 32.6 ML/MIN/1.73
ERYTHROCYTE [DISTWIDTH] IN BLOOD BY AUTOMATED COUNT: 15.3 % (ref 12.3–15.4)
FLUAV SUBTYP SPEC NAA+PROBE: NOT DETECTED
FLUBV RNA ISLT QL NAA+PROBE: NOT DETECTED
GEN 5 2HR TROPONIN T REFLEX: 67 NG/L
GLOBULIN UR ELPH-MCNC: 2.4 GM/DL
GLUCOSE BLDC GLUCOMTR-MCNC: 180 MG/DL (ref 70–130)
GLUCOSE BLDC GLUCOMTR-MCNC: 84 MG/DL (ref 70–130)
GLUCOSE SERPL-MCNC: 79 MG/DL (ref 65–99)
GLUCOSE UR STRIP-MCNC: NEGATIVE MG/DL
HADV DNA SPEC NAA+PROBE: NOT DETECTED
HCOV 229E RNA SPEC QL NAA+PROBE: NOT DETECTED
HCOV HKU1 RNA SPEC QL NAA+PROBE: NOT DETECTED
HCOV NL63 RNA SPEC QL NAA+PROBE: NOT DETECTED
HCOV OC43 RNA SPEC QL NAA+PROBE: NOT DETECTED
HCT VFR BLD AUTO: 40.8 % (ref 34–46.6)
HGB BLD-MCNC: 13.2 G/DL (ref 12–15.9)
HGB UR QL STRIP.AUTO: NEGATIVE
HMPV RNA NPH QL NAA+NON-PROBE: NOT DETECTED
HPIV1 RNA ISLT QL NAA+PROBE: NOT DETECTED
HPIV2 RNA SPEC QL NAA+PROBE: NOT DETECTED
HPIV3 RNA NPH QL NAA+PROBE: NOT DETECTED
HPIV4 P GENE NPH QL NAA+PROBE: NOT DETECTED
KETONES UR QL STRIP: NEGATIVE
LEUKOCYTE ESTERASE UR QL STRIP.AUTO: NEGATIVE
LIPASE SERPL-CCNC: 37 U/L (ref 13–60)
LYMPHOCYTES # BLD MANUAL: 0.85 10*3/MM3 (ref 0.7–3.1)
LYMPHOCYTES NFR BLD MANUAL: 5.2 % (ref 5–12)
M PNEUMO IGG SER IA-ACNC: NOT DETECTED
MCH RBC QN AUTO: 28.8 PG (ref 26.6–33)
MCHC RBC AUTO-ENTMCNC: 32.4 G/DL (ref 31.5–35.7)
MCV RBC AUTO: 88.9 FL (ref 79–97)
MONOCYTES # BLD: 0.48 10*3/MM3 (ref 0.1–0.9)
NEUTROPHILS # BLD AUTO: 7.86 10*3/MM3 (ref 1.7–7)
NEUTROPHILS NFR BLD MANUAL: 85.6 % (ref 42.7–76)
NITRITE UR QL STRIP: NEGATIVE
NT-PROBNP SERPL-MCNC: 2394 PG/ML (ref 0–1800)
PH UR STRIP.AUTO: 6.5 [PH] (ref 5–8)
PLAT MORPH BLD: NORMAL
PLATELET # BLD AUTO: 41 10*3/MM3 (ref 140–450)
PMV BLD AUTO: 12.5 FL (ref 6–12)
POTASSIUM SERPL-SCNC: 4.4 MMOL/L (ref 3.5–5.2)
PROT SERPL-MCNC: 6.5 G/DL (ref 6–8.5)
PROT UR QL STRIP: NEGATIVE
QT INTERVAL: 430 MS
RBC # BLD AUTO: 4.59 10*6/MM3 (ref 3.77–5.28)
RBC MORPH BLD: NORMAL
RHINOVIRUS RNA SPEC NAA+PROBE: NOT DETECTED
RSV RNA NPH QL NAA+NON-PROBE: NOT DETECTED
SARS-COV-2 RNA NPH QL NAA+NON-PROBE: NOT DETECTED
SMUDGE CELLS BLD QL SMEAR: ABNORMAL
SODIUM SERPL-SCNC: 137 MMOL/L (ref 136–145)
SP GR UR STRIP: 1.01 (ref 1–1.03)
TROPONIN T DELTA: -10 NG/L
TROPONIN T SERPL HS-MCNC: 77 NG/L
UROBILINOGEN UR QL STRIP: NORMAL
VARIANT LYMPHS NFR BLD MANUAL: 9.3 % (ref 19.6–45.3)
WBC NRBC COR # BLD: 9.18 10*3/MM3 (ref 3.4–10.8)

## 2023-03-07 PROCEDURE — 72148 MRI LUMBAR SPINE W/O DYE: CPT

## 2023-03-07 PROCEDURE — 80053 COMPREHEN METABOLIC PANEL: CPT | Performed by: EMERGENCY MEDICINE

## 2023-03-07 PROCEDURE — 99285 EMERGENCY DEPT VISIT HI MDM: CPT

## 2023-03-07 PROCEDURE — 93005 ELECTROCARDIOGRAM TRACING: CPT | Performed by: EMERGENCY MEDICINE

## 2023-03-07 PROCEDURE — 81003 URINALYSIS AUTO W/O SCOPE: CPT | Performed by: EMERGENCY MEDICINE

## 2023-03-07 PROCEDURE — 85025 COMPLETE CBC W/AUTO DIFF WBC: CPT | Performed by: EMERGENCY MEDICINE

## 2023-03-07 PROCEDURE — 82962 GLUCOSE BLOOD TEST: CPT

## 2023-03-07 PROCEDURE — 85007 BL SMEAR W/DIFF WBC COUNT: CPT | Performed by: EMERGENCY MEDICINE

## 2023-03-07 PROCEDURE — 72170 X-RAY EXAM OF PELVIS: CPT

## 2023-03-07 PROCEDURE — G0378 HOSPITAL OBSERVATION PER HR: HCPCS

## 2023-03-07 PROCEDURE — 72110 X-RAY EXAM L-2 SPINE 4/>VWS: CPT

## 2023-03-07 PROCEDURE — P9612 CATHETERIZE FOR URINE SPEC: HCPCS

## 2023-03-07 PROCEDURE — 83690 ASSAY OF LIPASE: CPT | Performed by: EMERGENCY MEDICINE

## 2023-03-07 PROCEDURE — 25010000002 HYDROMORPHONE PER 4 MG: Performed by: EMERGENCY MEDICINE

## 2023-03-07 PROCEDURE — 84484 ASSAY OF TROPONIN QUANT: CPT | Performed by: EMERGENCY MEDICINE

## 2023-03-07 PROCEDURE — 94799 UNLISTED PULMONARY SVC/PX: CPT

## 2023-03-07 PROCEDURE — 71045 X-RAY EXAM CHEST 1 VIEW: CPT

## 2023-03-07 PROCEDURE — 94640 AIRWAY INHALATION TREATMENT: CPT

## 2023-03-07 PROCEDURE — 99222 1ST HOSP IP/OBS MODERATE 55: CPT | Performed by: NURSE PRACTITIONER

## 2023-03-07 PROCEDURE — 93010 ELECTROCARDIOGRAM REPORT: CPT | Performed by: INTERNAL MEDICINE

## 2023-03-07 PROCEDURE — 83880 ASSAY OF NATRIURETIC PEPTIDE: CPT | Performed by: EMERGENCY MEDICINE

## 2023-03-07 PROCEDURE — 0202U NFCT DS 22 TRGT SARS-COV-2: CPT | Performed by: EMERGENCY MEDICINE

## 2023-03-07 RX ORDER — HYDROCODONE BITARTRATE AND ACETAMINOPHEN 7.5; 325 MG/1; MG/1
1 TABLET ORAL EVERY 6 HOURS
Status: DISCONTINUED | OUTPATIENT
Start: 2023-03-07 | End: 2023-03-14 | Stop reason: HOSPADM

## 2023-03-07 RX ORDER — LORAZEPAM 0.5 MG/1
0.5 TABLET ORAL EVERY 8 HOURS PRN
Status: DISCONTINUED | OUTPATIENT
Start: 2023-03-07 | End: 2023-03-14 | Stop reason: HOSPADM

## 2023-03-07 RX ORDER — FUROSEMIDE 40 MG/1
40 TABLET ORAL DAILY
Status: DISCONTINUED | OUTPATIENT
Start: 2023-03-07 | End: 2023-03-13

## 2023-03-07 RX ORDER — CETIRIZINE HYDROCHLORIDE 10 MG/1
5 TABLET ORAL DAILY PRN
Status: DISCONTINUED | OUTPATIENT
Start: 2023-03-07 | End: 2023-03-13 | Stop reason: SDUPTHER

## 2023-03-07 RX ORDER — MELATONIN
1000 DAILY
Status: DISCONTINUED | OUTPATIENT
Start: 2023-03-07 | End: 2023-03-14 | Stop reason: HOSPADM

## 2023-03-07 RX ORDER — HYDRALAZINE HYDROCHLORIDE 50 MG/1
50 TABLET, FILM COATED ORAL 2 TIMES DAILY
Status: DISCONTINUED | OUTPATIENT
Start: 2023-03-07 | End: 2023-03-14 | Stop reason: HOSPADM

## 2023-03-07 RX ORDER — LIDOCAINE 50 MG/G
1 PATCH TOPICAL EVERY 24 HOURS
Status: DISCONTINUED | OUTPATIENT
Start: 2023-03-07 | End: 2023-03-14 | Stop reason: HOSPADM

## 2023-03-07 RX ORDER — SODIUM CHLORIDE 0.9 % (FLUSH) 0.9 %
10 SYRINGE (ML) INJECTION EVERY 12 HOURS SCHEDULED
Status: DISCONTINUED | OUTPATIENT
Start: 2023-03-07 | End: 2023-03-14 | Stop reason: HOSPADM

## 2023-03-07 RX ORDER — HYDROMORPHONE HYDROCHLORIDE 1 MG/ML
0.5 INJECTION, SOLUTION INTRAMUSCULAR; INTRAVENOUS; SUBCUTANEOUS ONCE
Status: DISCONTINUED | OUTPATIENT
Start: 2023-03-07 | End: 2023-03-07

## 2023-03-07 RX ORDER — CETIRIZINE HYDROCHLORIDE 10 MG/1
10 TABLET ORAL DAILY
Status: DISCONTINUED | OUTPATIENT
Start: 2023-03-07 | End: 2023-03-14 | Stop reason: HOSPADM

## 2023-03-07 RX ORDER — HYDROMORPHONE HYDROCHLORIDE 1 MG/ML
0.25 INJECTION, SOLUTION INTRAMUSCULAR; INTRAVENOUS; SUBCUTANEOUS ONCE
Status: COMPLETED | OUTPATIENT
Start: 2023-03-07 | End: 2023-03-07

## 2023-03-07 RX ORDER — SODIUM CHLORIDE 0.9 % (FLUSH) 0.9 %
10 SYRINGE (ML) INJECTION AS NEEDED
Status: DISCONTINUED | OUTPATIENT
Start: 2023-03-07 | End: 2023-03-14 | Stop reason: HOSPADM

## 2023-03-07 RX ORDER — NITROGLYCERIN 0.4 MG/1
0.4 TABLET SUBLINGUAL
Status: DISCONTINUED | OUTPATIENT
Start: 2023-03-07 | End: 2023-03-14 | Stop reason: HOSPADM

## 2023-03-07 RX ORDER — ONDANSETRON 4 MG/1
8 TABLET, FILM COATED ORAL AS NEEDED
Status: DISCONTINUED | OUTPATIENT
Start: 2023-03-07 | End: 2023-03-14 | Stop reason: HOSPADM

## 2023-03-07 RX ORDER — ACETAMINOPHEN 325 MG/1
650 TABLET ORAL EVERY 4 HOURS PRN
Status: DISCONTINUED | OUTPATIENT
Start: 2023-03-07 | End: 2023-03-14 | Stop reason: HOSPADM

## 2023-03-07 RX ORDER — MONTELUKAST SODIUM 10 MG/1
10 TABLET ORAL NIGHTLY
Status: DISCONTINUED | OUTPATIENT
Start: 2023-03-07 | End: 2023-03-14 | Stop reason: HOSPADM

## 2023-03-07 RX ORDER — LEVOTHYROXINE SODIUM 0.05 MG/1
50 TABLET ORAL
Status: DISCONTINUED | OUTPATIENT
Start: 2023-03-08 | End: 2023-03-14 | Stop reason: HOSPADM

## 2023-03-07 RX ORDER — IBUPROFEN 600 MG/1
1 TABLET ORAL
Status: DISCONTINUED | OUTPATIENT
Start: 2023-03-07 | End: 2023-03-14 | Stop reason: HOSPADM

## 2023-03-07 RX ORDER — TAMSULOSIN HYDROCHLORIDE 0.4 MG/1
0.4 CAPSULE ORAL DAILY
Status: DISCONTINUED | OUTPATIENT
Start: 2023-03-07 | End: 2023-03-14 | Stop reason: HOSPADM

## 2023-03-07 RX ORDER — GABAPENTIN 300 MG/1
300 CAPSULE ORAL EVERY 12 HOURS SCHEDULED
Status: DISCONTINUED | OUTPATIENT
Start: 2023-03-07 | End: 2023-03-08

## 2023-03-07 RX ORDER — AMLODIPINE BESYLATE 2.5 MG/1
2.5 TABLET ORAL DAILY
Status: DISCONTINUED | OUTPATIENT
Start: 2023-03-07 | End: 2023-03-14 | Stop reason: HOSPADM

## 2023-03-07 RX ORDER — HYDROMORPHONE HYDROCHLORIDE 1 MG/ML
0.25 INJECTION, SOLUTION INTRAMUSCULAR; INTRAVENOUS; SUBCUTANEOUS
Status: DISCONTINUED | OUTPATIENT
Start: 2023-03-07 | End: 2023-03-14 | Stop reason: HOSPADM

## 2023-03-07 RX ORDER — SODIUM CHLORIDE 9 MG/ML
40 INJECTION, SOLUTION INTRAVENOUS AS NEEDED
Status: DISCONTINUED | OUTPATIENT
Start: 2023-03-07 | End: 2023-03-14 | Stop reason: HOSPADM

## 2023-03-07 RX ORDER — ONDANSETRON 2 MG/ML
4 INJECTION INTRAMUSCULAR; INTRAVENOUS EVERY 6 HOURS PRN
Status: DISCONTINUED | OUTPATIENT
Start: 2023-03-07 | End: 2023-03-14 | Stop reason: HOSPADM

## 2023-03-07 RX ORDER — BUDESONIDE AND FORMOTEROL FUMARATE DIHYDRATE 160; 4.5 UG/1; UG/1
2 AEROSOL RESPIRATORY (INHALATION)
Status: DISCONTINUED | OUTPATIENT
Start: 2023-03-07 | End: 2023-03-14 | Stop reason: HOSPADM

## 2023-03-07 RX ORDER — PREDNISONE 10 MG/1
10 TABLET ORAL
Status: DISCONTINUED | OUTPATIENT
Start: 2023-03-08 | End: 2023-03-14 | Stop reason: HOSPADM

## 2023-03-07 RX ORDER — ACETAMINOPHEN 160 MG/5ML
650 SOLUTION ORAL EVERY 4 HOURS PRN
Status: DISCONTINUED | OUTPATIENT
Start: 2023-03-07 | End: 2023-03-14 | Stop reason: HOSPADM

## 2023-03-07 RX ORDER — NICOTINE POLACRILEX 4 MG
15 LOZENGE BUCCAL
Status: DISCONTINUED | OUTPATIENT
Start: 2023-03-07 | End: 2023-03-14 | Stop reason: HOSPADM

## 2023-03-07 RX ORDER — ACETAMINOPHEN 650 MG/1
650 SUPPOSITORY RECTAL EVERY 4 HOURS PRN
Status: DISCONTINUED | OUTPATIENT
Start: 2023-03-07 | End: 2023-03-14 | Stop reason: HOSPADM

## 2023-03-07 RX ORDER — INSULIN LISPRO 100 [IU]/ML
0-9 INJECTION, SOLUTION INTRAVENOUS; SUBCUTANEOUS
Status: DISCONTINUED | OUTPATIENT
Start: 2023-03-07 | End: 2023-03-14 | Stop reason: HOSPADM

## 2023-03-07 RX ORDER — DEXTROSE MONOHYDRATE 25 G/50ML
25 INJECTION, SOLUTION INTRAVENOUS
Status: DISCONTINUED | OUTPATIENT
Start: 2023-03-07 | End: 2023-03-14 | Stop reason: HOSPADM

## 2023-03-07 RX ORDER — CARVEDILOL 12.5 MG/1
12.5 TABLET ORAL 2 TIMES DAILY
Status: DISCONTINUED | OUTPATIENT
Start: 2023-03-07 | End: 2023-03-14 | Stop reason: HOSPADM

## 2023-03-07 RX ADMIN — CARVEDILOL 12.5 MG: 12.5 TABLET, FILM COATED ORAL at 21:50

## 2023-03-07 RX ADMIN — HYDRALAZINE HYDROCHLORIDE 50 MG: 50 TABLET ORAL at 21:51

## 2023-03-07 RX ADMIN — AMLODIPINE BESYLATE 2.5 MG: 2.5 TABLET ORAL at 21:50

## 2023-03-07 RX ADMIN — HYDROMORPHONE HYDROCHLORIDE 0.25 MG: 1 INJECTION, SOLUTION INTRAMUSCULAR; INTRAVENOUS; SUBCUTANEOUS at 13:26

## 2023-03-07 RX ADMIN — FUROSEMIDE 40 MG: 40 TABLET ORAL at 21:49

## 2023-03-07 RX ADMIN — HYDROMORPHONE HYDROCHLORIDE 0.25 MG: 1 INJECTION, SOLUTION INTRAMUSCULAR; INTRAVENOUS; SUBCUTANEOUS at 16:26

## 2023-03-07 RX ADMIN — TAMSULOSIN HYDROCHLORIDE 0.4 MG: 0.4 CAPSULE ORAL at 21:48

## 2023-03-07 RX ADMIN — HYDROCODONE BITARTRATE AND ACETAMINOPHEN 1 TABLET: 7.5; 325 TABLET ORAL at 21:50

## 2023-03-07 RX ADMIN — MONTELUKAST SODIUM 10 MG: 10 TABLET, FILM COATED ORAL at 21:49

## 2023-03-07 RX ADMIN — LORAZEPAM 0.5 MG: 0.5 TABLET ORAL at 22:06

## 2023-03-07 RX ADMIN — Medication 1000 UNITS: at 21:56

## 2023-03-07 RX ADMIN — CETIRIZINE HYDROCHLORIDE 10 MG: 10 TABLET ORAL at 21:49

## 2023-03-07 RX ADMIN — GABAPENTIN 300 MG: 300 CAPSULE ORAL at 21:51

## 2023-03-07 RX ADMIN — BUDESONIDE AND FORMOTEROL FUMARATE DIHYDRATE 2 PUFF: 160; 4.5 AEROSOL RESPIRATORY (INHALATION) at 20:51

## 2023-03-07 RX ADMIN — INSULIN GLARGINE-YFGN 20 UNITS: 100 INJECTION, SOLUTION SUBCUTANEOUS at 22:06

## 2023-03-07 RX ADMIN — LIDOCAINE 1 PATCH: 50 PATCH CUTANEOUS at 21:52

## 2023-03-07 RX ADMIN — Medication 10 ML: at 22:08

## 2023-03-07 NOTE — ED PROVIDER NOTES
" EMERGENCY DEPARTMENT ENCOUNTER    Room Number:  N630/1  Date of encounter:  3/7/2023  PCP: Mariah Hickman MD  Historian: Patient, daughter, and granddaughter who are at bedside    Patient was placed in face mask during triage process. Patient was wearing facemask when I entered the room and throughout our encounter. I wore full protective equipment throughout this patient encounter including a face mask, eye protection, and gloves. Hand hygiene was performed before donning protective equipment and again following doffing of PPE after leaving the room.    HPI:  Chief Complaint: Diffuse body pain and generalized weakness  A complete HPI/ROS/PMH/PSH/SH/FH are unobtainable due to: N/A   Context: Helena Carmen is a 89 y.o. female who presents to the ED c/o worsening diffuse generalized body pain limiting her ability to walk over the last few days.  Pain specifically in the low back radiating to bilateral hips seems to be the worst.  Patient also noted to be bruising more easily in the last day or 2.  No reported nausea vomiting or abdominal pain.  Occasional shortness of breath feels like \"congestion.\"  No reported fevers or new falls.      MEDICAL HISTORY REVIEW  EMR reviewed:    Patient had admission December 2022 for pain after fall.  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, cirrhosis of the liver, type 2 diabetes with hyperglycemia and gastroparesis among other things    PAST MEDICAL HISTORY  Active Ambulatory Problems     Diagnosis Date Noted   • Aortic valve stenosis 04/05/2016   • Fatigue 04/05/2016   • MI (mitral incompetence) 04/05/2016   • PVC (premature ventricular contraction) 04/05/2016   • Legally blind 05/25/2018   • Essential hypertension 05/25/2018   • Hypertriglyceridemia 05/31/2018   • Pulmonary hypertension (HCC) 06/25/2020   • Paroxysmal atrial fibrillation (HCC) 06/25/2020   • Anxiety 07/10/2014   • Stage 4 chronic kidney disease (HCC) 03/20/2015   • Chronic pain disorder 01/26/2022   • " Degeneration of lumbar intervertebral disc 09/09/2014   • Type 2 diabetes mellitus with hyperglycemia ([facility]) 01/26/2022   • Esophageal reflux 07/10/2014   • Gastroparesis 01/26/2022   • Hiatal hernia 01/26/2022   • Iatrogenic hypothyroidism 07/08/2014   • Macular degeneration 01/26/2022   • Malignant neoplasm of uterus ([facility]) 01/26/2022   • Osteoarthritis of knee 07/10/2014   • Peripheral nerve disease 07/10/2014   • Secondary hyperparathyroidism ([facility]) 04/05/2016   • Thrombocytopenia ([facility]) 07/11/2014   • Chronic heart failure with preserved ejection fraction ([facility]) 03/02/2022   • Other cirrhosis of liver ([facility]) 03/03/2022   • Hypothyroidism 02/22/2022   • Nonrheumatic tricuspid valve regurgitation 02/22/2022   • Anemia, chronic disease 07/30/2022   • Closed fracture of fifth lumbar vertebra ([facility]) 08/24/2022   • Closed fracture of fifth lumbar vertebra, unspecified fracture morphology, initial encounter ([facility]) 08/25/2022   • Diabetic polyneuropathy associated with type 2 diabetes mellitus ([facility]) 08/26/2022   • Chronic ITP (idiopathic thrombocytopenia) ([facility]) 08/28/2022   • Chronic midline low back pain with bilateral sciatica 10/16/2022   • Acute deep vein thrombosis of calf, bilateral ([facility]) 10/17/2022     Resolved Ambulatory Problems     Diagnosis Date Noted   • Diastolic dysfunction 04/05/2016   • Hypertensive pulmonary vascular disease ([facility]) 04/05/2016   • Bioprosthetic aortic valve replacement 05/10/2017   • Breast screening 07/08/2014   • Abdominal pain, right lower quadrant 12/13/2020   • Allergic rhinitis 07/10/2014   • Angina pectoris ([facility]) 07/10/2014   • Pancreatitis 01/26/2022   • Colitis due to Clostridioides difficile 01/26/2022   • Hypothyroidism 01/26/2022   • Long term (current) use of opiate analgesic 01/26/2022   • Uncontrolled type 2 diabetes mellitus 07/08/2014   • Benign essential hypertension 07/08/2014   • Hypertension 01/26/2022   • Pulmonary hypertension ([facility]) 04/05/2016   • Hypercholesterolemia  01/26/2022   • Hyperlipidemia 07/08/2014   • Legal blindness 05/25/2018   • Mitral valve disorder 07/10/2014   • Mitral valve regurgitation 04/05/2016   • Ventricular premature beats 04/05/2016   • History of aortic valve replacement 05/10/2017   • Nonrheumatic tricuspid valve regurgitation    • Elevated serum creatinine    • CHF (congestive heart failure) ([facility]) 03/02/2022   • Hypertensive disorder 02/22/2022   • Pancreatitis 02/22/2022   • Sepsis ([facility]) 06/05/2022   • COVID-19 07/30/2022   • Hyponatremia 07/30/2022   • Weakness generalized 07/30/2022   • Leukopenia 08/02/2022   • Cytokine release syndrome, grade 1 08/02/2022   • Urine retention 08/02/2022   • COVID-19 virus infection 08/06/2022   • Acute DVT (deep venous thrombosis) ([facility]) 08/07/2022   • Lumbar compression fracture, closed, initial encounter ([facility]) 10/17/2022   • Acute diastolic congestive heart failure ([facility]) 12/20/2022   • Esophageal dysphagia 12/24/2022     Past Medical History:   Diagnosis Date   • Back pain    • CKD (chronic kidney disease)    • Diverticulosis    • Exertional shortness of breath    • Heart disease    • Hyperthyroidism    • L5 compression fracture ([facility]) 08/2022   • Left ventricular hypertrophy    • Liver disease    • Mitral regurgitation    • Osteoarthritis of hip    • Osteoporosis    • Premature ventricular contractions    • Renal insufficiency syndrome    • Type 2 diabetes mellitus ([facility])    • Uterine cancer ([facility])          PAST SURGICAL HISTORY  Past Surgical History:   Procedure Laterality Date   • AORTIC VALVE REPAIR/REPLACEMENT     • CATARACT EXTRACTION      1970, 1999   • CHOLECYSTECTOMY     • ENDOSCOPY  08/15/2014    no gross lesions in stomach/duodenum, erythrematous mucosa in stomach   • HYSTERECTOMY  2007   • STERNOTOMY           FAMILY HISTORY  Family History   Problem Relation Age of Onset   • Heart disease Mother    • Hypertension Mother    • Stroke Mother    • Diabetes Mother         mellitus   • Other Other          cardiovascular disorder         SOCIAL HISTORY  Social History     Socioeconomic History   • Marital status:    Tobacco Use   • Smoking status: Former     Packs/day: 0.50     Types: Cigarettes     Quit date: 7/10/1969     Years since quittin.6   • Smokeless tobacco: Never   Vaping Use   • Vaping Use: Never used   Substance and Sexual Activity   • Alcohol use: No     Comment: caffeine use - coffee 2 cups daily   • Drug use: No   • Sexual activity: Defer         ALLERGIES  Erythromycin, Statins, Cephalexin, Penicillins, and Sulfa antibiotics        REVIEW OF SYSTEMS  Review of Systems     All systems reviewed and negative except for those discussed in HPI.       PHYSICAL EXAM    I have reviewed the triage vital signs and nursing notes.    ED Triage Vitals [23 1110]   Temp Heart Rate Resp BP SpO2   98.7 °F (37.1 °C) 64 16 153/56 93 %      Temp src Heart Rate Source Patient Position BP Location FiO2 (%)   -- -- -- -- --       Physical Exam    Physical Exam   Constitutional: No distress.  Chronically ill-appearing but not overtly toxic.  Talkative.  HENT:  Head: Normocephalic and atraumatic.   Oropharynx: Mucous membranes are moist.   Eyes: No scleral icterus. No conjunctival pallor.  Exam consistent with blindness  Neck: Painless range of motion noted. Neck supple.   Cardiovascular: Normal rate, regular rhythm and intact distal pulses.  Pulmonary/Chest: No respiratory distress.  No tachypnea or increased work of breathing noted   abdominal: Large habitus is soft. There is no tenderness. There is no rebound and no guarding.   Musculoskeletal: Moves all extremities equally but somewhat weakly.  Petechial lesions bilateral shins   neurological: Alert.  Oriented.  Speech fluent.  Moves all extremities equally but rather weakly.  Diminished DTRs bilateral lower extremities patellar and Achilles though the patient has difficulty relaxing for this accurate assessment.  Strong plantarflexion and dorsiflexion  bilaterally with sensation to light touch grossly intact bilateral lower extremities.  Skin: Skin is pink, warm, and dry. No pallor.  Diffuse petechial lesions on extremities noted.  Psychiatric: Mood and affect normal.   Nursing note and vitals reviewed.    LAB RESULTS  Recent Results (from the past 24 hour(s))   Urinalysis With Culture If Indicated - Straight Cath    Collection Time: 03/07/23 11:38 AM    Specimen: Straight Cath; Urine   Result Value Ref Range    Color, UA Yellow Yellow, Straw    Appearance, UA Clear Clear    pH, UA 6.5 5.0 - 8.0    Specific Gravity, UA 1.007 1.005 - 1.030    Glucose, UA Negative Negative    Ketones, UA Negative Negative    Bilirubin, UA Negative Negative    Blood, UA Negative Negative    Protein, UA Negative Negative    Leuk Esterase, UA Negative Negative    Nitrite, UA Negative Negative    Urobilinogen, UA 1.0 E.U./dL 0.2 - 1.0 E.U./dL   POC Glucose Once    Collection Time: 03/07/23 11:40 AM    Specimen: Blood   Result Value Ref Range    Glucose 84 70 - 130 mg/dL   Respiratory Panel PCR w/COVID-19(SARS-CoV-2) LATONIA/CHARLIE/ALEJANDRO/PAD/COR/MAD/PASQUALE In-House, NP Swab in UTM/VTM, 3-4 HR TAT - Swab, Nasopharynx    Collection Time: 03/07/23 11:42 AM    Specimen: Nasopharynx; Swab   Result Value Ref Range    ADENOVIRUS, PCR Not Detected Not Detected    Coronavirus 229E Not Detected Not Detected    Coronavirus HKU1 Not Detected Not Detected    Coronavirus NL63 Not Detected Not Detected    Coronavirus OC43 Not Detected Not Detected    COVID19 Not Detected Not Detected - Ref. Range    Human Metapneumovirus Not Detected Not Detected    Human Rhinovirus/Enterovirus Not Detected Not Detected    Influenza A PCR Not Detected Not Detected    Influenza B PCR Not Detected Not Detected    Parainfluenza Virus 1 Not Detected Not Detected    Parainfluenza Virus 2 Not Detected Not Detected    Parainfluenza Virus 3 Not Detected Not Detected    Parainfluenza Virus 4 Not Detected Not Detected    RSV, PCR Not  Detected Not Detected    Bordetella pertussis pcr Not Detected Not Detected    Bordetella parapertussis PCR Not Detected Not Detected    Chlamydophila pneumoniae PCR Not Detected Not Detected    Mycoplasma pneumo by PCR Not Detected Not Detected   Comprehensive Metabolic Panel    Collection Time: 03/07/23 11:53 AM    Specimen: Blood   Result Value Ref Range    Glucose 79 65 - 99 mg/dL    BUN 31 (H) 8 - 23 mg/dL    Creatinine 1.50 (H) 0.57 - 1.00 mg/dL    Sodium 137 136 - 145 mmol/L    Potassium 4.4 3.5 - 5.2 mmol/L    Chloride 102 98 - 107 mmol/L    CO2 25.1 22.0 - 29.0 mmol/L    Calcium 8.8 8.2 - 9.6 mg/dL    Total Protein 6.5 6.0 - 8.5 g/dL    Albumin 4.1 3.5 - 5.2 g/dL    ALT (SGPT) 19 1 - 33 U/L    AST (SGOT) 15 1 - 32 U/L    Alkaline Phosphatase 58 39 - 117 U/L    Total Bilirubin 0.5 0.0 - 1.2 mg/dL    Globulin 2.4 gm/dL    A/G Ratio 1.7 g/dL    BUN/Creatinine Ratio 20.7 7.0 - 25.0    Anion Gap 9.9 5.0 - 15.0 mmol/L    eGFR 32.6 (L) >60.0 mL/min/1.73   High Sensitivity Troponin T    Collection Time: 03/07/23 11:53 AM    Specimen: Blood   Result Value Ref Range    HS Troponin T 77 (C) <10 ng/L   Lipase    Collection Time: 03/07/23 11:53 AM    Specimen: Blood   Result Value Ref Range    Lipase 37 13 - 60 U/L   CBC Auto Differential    Collection Time: 03/07/23 11:53 AM    Specimen: Blood   Result Value Ref Range    WBC 9.18 3.40 - 10.80 10*3/mm3    RBC 4.59 3.77 - 5.28 10*6/mm3    Hemoglobin 13.2 12.0 - 15.9 g/dL    Hematocrit 40.8 34.0 - 46.6 %    MCV 88.9 79.0 - 97.0 fL    MCH 28.8 26.6 - 33.0 pg    MCHC 32.4 31.5 - 35.7 g/dL    RDW 15.3 12.3 - 15.4 %    RDW-SD 49.6 37.0 - 54.0 fl    MPV 12.5 (H) 6.0 - 12.0 fL    Platelets 41 (C) 140 - 450 10*3/mm3   BNP    Collection Time: 03/07/23 11:53 AM    Specimen: Blood   Result Value Ref Range    proBNP 2,394.0 (H) 0.0 - 1,800.0 pg/mL   Manual Differential    Collection Time: 03/07/23 11:53 AM    Specimen: Blood   Result Value Ref Range    Neutrophil % 85.6 (H) 42.7 -  76.0 %    Lymphocyte % 9.3 (L) 19.6 - 45.3 %    Monocyte % 5.2 5.0 - 12.0 %    Neutrophils Absolute 7.86 (H) 1.70 - 7.00 10*3/mm3    Lymphocytes Absolute 0.85 0.70 - 3.10 10*3/mm3    Monocytes Absolute 0.48 0.10 - 0.90 10*3/mm3    RBC Morphology Normal Normal    Smudge Cells Slight/1+ None Seen    Platelet Morphology Normal Normal   ECG 12 Lead Other; weak    Collection Time: 03/07/23 12:07 PM   Result Value Ref Range    QT Interval 430 ms   High Sensitivity Troponin T 2Hr    Collection Time: 03/07/23  2:41 PM    Specimen: Blood   Result Value Ref Range    HS Troponin T 67 (C) <10 ng/L    Troponin T Delta -10 (L) >=-4 - <+4 ng/L       Ordered the above labs and independently reviewed the results.        RADIOLOGY  XR Chest 1 View, XR Spine Lumbar Complete 4+VW, XR Pelvis 1 or 2 View    Result Date: 3/7/2023  XR CHEST 1 VW-, XR SPINE LUMBAR COMPLETE 4+VW-, XR PELVIS 1 OR 2 VW-  Clinical: Low back and bilateral hip pain with a history of L5 fracture, chest congestion  COMPARISON chest radiograph 12/21/2022, lumbar spine 12/7/2022 and AP pelvis from 12/7/2022  Chest findings: There are median sternotomy wires in position as before. Atherosclerotic calcification of the aorta. There are some prominence of the thoracic inlet in part related to apical lordotic projection. There is cardiomegaly. No pulmonary edema, lung consolidation or gross pleural effusion seen. Calcified benign granulomas demonstrated at the right lung base. Calcified mitral valve again seen.  CONCLUSION: Cardiomegaly.  AP pelvis findings: There is mild bilateral hip joint narrowing as before. There is bone hypertrophy demonstrated along the acetabulum, similar to the previous examination. No avascular necrosis or fracture. Right and left hemipelvis have a normal appearance. Vascular arterial calcifications demonstrated within the soft tissues.  CONCLUSION: Bilateral hip joint degeneration similar to 12/7/2022. No acute osseous abnormality has  developed.  Lumbar spine findings: There is multilevel lumbar disc degeneration similar to the previous examination.  The L5 vertebral body height appears slightly smaller compared to the previous examination, this could be projectional or secondary to new mild compression.  There is lower lumbar facet hypertrophy seen. No spondylolysis nor spondylolisthesis identified. Spurring and osteophyte formation again seen.  CONCLUSION: There is degenerative change which is similar to 12/7/2022. The L5 vertebral body height appears slightly diminished compared to 12/7/2022, which could be related to projectional factors or new mild compression.  This report was finalized on 3/7/2023 12:57 PM by Dr. Art Fabian M.D.        I ordered the above noted radiological studies. Reviewed by me and discussed with radiologist.  See dictation for official radiology interpretation.      PROCEDURES    Procedures        MEDICATIONS GIVEN IN ER    Medications   sodium chloride 0.9 % flush 10 mL (has no administration in time range)   HYDROmorphone (DILAUDID) injection 0.25 mg (0.25 mg Intravenous Given 3/7/23 1326)   HYDROmorphone (DILAUDID) injection 0.25 mg (0.25 mg Intravenous Given 3/7/23 1626)         PROGRESS, DATA ANALYSIS, CONSULTS, AND MEDICAL DECISION MAKING    My differential diagnosis includes but is not limited to generalized weakness, electrolyte abnormality, CVA, TIA, Bell's palsy, acute MI, GI bleed, urinary tract infection, systemic infections including sepsis, alcohol abuse, drug abuse including prescription and street drug.    My differential diagnosis for back pain includes but is not limited to:  Musculoskeletal strain, contusion, retroperitoneal hematoma, disc protrusion, vertebral fracture, transverse process fracture, rib fracture, facet syndrome, sacroiliac joint strain, sciatica, renal injury, splenic injury, pancreatic injury, osteoarthritis, lumbar spondylosis, spinal stenosis, ankylosing spondylitis,  sacroiliac joint inflammation, pancreatitis, perforated peptic ulcer, diverticulitis, endometriosis, chronic PID, epidural abscess, osteomyelitis, retroperitoneal abscess, pyelonephritis, pneumonia, subphrenic abscess, tuberculosis, neurofibroma, meningioma, multiple myeloma, lymphoma, metastatic cancer, primary cancer, AAA, aortic dissection, spinal ischemia, referred pain, ureterolithiasis      All labs have been independently reviewed by me.  All radiology studies have been reviewed by me and discussed with radiologist dictating the report.   EKG's independently viewed and interpreted by me.  Discussion below represents my analysis of pertinent findings related to patient's condition, differential diagnosis, treatment plan and final disposition.      ED Course as of 03/07/23 1821   Tue Mar 07, 2023   1210 EKG           EKG time: 1207  Rhythm/Rate: Sinus, 65  P waves and MA: NICOLAS within normal limits  QRS, axis: IVCD  ST and T waves: No STEMI though there is some lateral ST depression; QTc within normal limits    Interpreted Contemporaneously by me, independently viewed  Comparison: 12/20/2022-similar ST abnormalities noted 1 aVL as well as V5 and V6 previously   [RS]   1335 COVID19: Not Detected [RS]   1335 Influenza A PCR: Not Detected [RS]   1335 Influenza B PCR: Not Detected [RS]   1335 WBC: 9.18 [RS]   1335 Hemoglobin: 13.2 [RS]   1335 Platelets(!!): 41  Slightly lower than prior [RS]   1336 Leukocytes, UA: Negative [RS]   1336 Nitrite, UA: Negative [RS]   1336 Creatinine(!): 1.50  Stable CKD [RS]   1336 HS Troponin T(!!): 77  Patient has history of chronic troponin leak noted on review of EMR when this facility was using prior generation of troponin T testing.  No comparisons for high-sensitivity troponin. [RS]   1337 proBNP(!): 2,394.0  Improved as compared to prior baseline [RS]   1337 RADIOLOGY      Study: Single view portable chest  Findings: Borderline cardiomegaly.  No focal infiltrate  I independently  viewed and interpreted these images contemporaneously with treatment.    [RS]   1346 Patient beginning to experience some relief of discomfort after pain medication.  Reviewed findings and recommendation for admission as patient is no longer able to ambulate at home.  Family and patient agreeable with plan. [RS]   1410 CONSULT        Provider: Dr. Sherman-hematology    Discussion: Reviewed patient history, ED presentation and evaluation.  He certainly shares my concern about acute back pain with thrombocytopenia at this level.  Hematoma would not be implausible.  Agrees with plan for admission and further evaluation.    Agreeable c treatment and planned disposition.         [RS]   1430 CONSULT        Provider: Dr. Lynch[facility]    Discussion: Reviewed patient history, ED presentation and evaluation as well as concerns.  He is agreeable accept patient for observation admission with telemetry for further evaluation and treatment.  He does request neurosurgery consultation in association with the stat MRI lumbar spine that has already been ordered.    Agreeable c treatment and planned disposition.         [RS]   1434 MRI Lumbar Spine Without Contrast [RS]   1435 Patient and family updated with results.  Patient feeling more comfortable.  We did discuss patient's continence.  No new incontinence is reported. [RS]   1456 CONSULT        Provider: aLxmi Pineda-neurosurgery    Discussion: Reviewed patient history, ED presentation and evaluation.  Agreeable to consult.  Agreeable with plan for MRI though she does recommend contrast with MRI.  I have discussed this with patient's family previously and they declined IV contrast.  Neurosurgery notified.    Agreeable c treatment and planned disposition.         [RS]   1819 MRI lumbar spine reviewed with neuroradiologist, Dr. Sorto.  No evidence of epidural hematoma.  No compression of the thecal sac.  Nonhealing L5 compression with worsening findings. [RS]      ED Course User  Index  [RS] Jem Darling MD       AS OF 18:21 EST VITALS:    BP - 157/65  HR - 59  TEMP - 98.7 °F (37.1 °C)  O2 SATS - 99%        DIAGNOSIS  Final diagnoses:   Acute on chronic bilateral low back pain   Unable to walk   Generalized weakness   Elevated troponin   Thrombocytopenia (HCC)   History of ITP   Chronic kidney disease, unspecified CKD stage   Closed compression fracture of L5 lumbar vertebra, with delayed healing, subsequent encounter         DISPOSITION  ADMISSION    Discussed treatment plan and reason for admission with pt/family and admitting physician.  Pt/family voiced understanding of the plan for admission for further testing/treatment as needed.          Jem Darling MD  03/07/23 1278       Jem Darling MD  03/07/23 1848

## 2023-03-07 NOTE — H&P
Internal medicine history and physical  INTERNAL MEDICINE   [facility]       Patient Identification:  Name: Helena Carmen  Age: 89 y.o.  Sex: female  :  1931  MRN: [id number]                   Primary Care Physician: Mariah Hickman MD                               Date of admission:3/7/2023    Chief Complaint: Worsening pain in lower back since last night and this morning affecting her mobility and pain going into her legs.    History of Present Illness:   Patient is a 89-year-old female who has complicated past medical history including history of L5 compression fracture for which she was hospitalized in 2022, history of hypothyroidism with hypothyroidism, history of hypertension dyslipidemia legally blind status due to macular degeneration, type 2 diabetes, paroxysmal atrial fibrillation chronic thrombocytopenia who has been battling with back pain due to compression fracture and underlying osteoporosis for some time but has recovered and improved enough to be ambulate with walker and assistance.  Her pain is all the time present in her back but manageable until last night when she started to see increasing pain and discomfort in her lower back and this morning she could not ambulate or move because the pain was going into her legs and hips.  Because of the significant worsening in her lower back pain and weakness she was brought to the emergency room for further evaluation as per recommendation by her hematologist who follows her for thrombocytopenia.  Patient has been overall not feeling well over the course of last 1 week and has been having body aches all over.  She also had a couple of episodes of coughing up blood due to subjective feeling of something stuck in her throat but this has resolved.  He can eat and drink without any issues.  Patient has been recently started on Prolia infusion for her osteoporosis.  Patient's case was discussed by emergency room provider  with her hematologist oncologist and concern was raised for possibility of bleeding in her epidural space causing this worsening symptoms given the fact that she has thrombocytopenia.  Neurosurgery service was consulted and assessment of chronic bilateral low back pain and acute left-sided back pain with left-sided sciatica was made.  Concern for epidural hematoma or localizing neurological lesion was considered to be low based on the assessment though MRI of the spine was recommended with further recommendation of orthospine evaluation.  Patient is being admitted for further care.    Past Medical History:  Past Medical History:   Diagnosis Date   • Aortic valve stenosis     s/p tissue AVR   • Back pain    • CKD (chronic kidney disease)    • Colitis due to Clostridioides difficile 01/26/2022   • Diastolic dysfunction     Grade 2 per echocardiogram 2013   • Diverticulosis    • Exertional shortness of breath    • Heart disease    • Hiatal hernia    • Hyperlipidemia    • Hypertension    • Hyperthyroidism    • Hypertriglyceridemia 05/31/2018   • Hypothyroidism    • L5 compression fracture (HCC) 08/2022   • Left ventricular hypertrophy    • Legally blind    • Liver disease    • Macular degeneration    • Mitral regurgitation    • Osteoarthritis of hip    • Osteoporosis    • Pancreatitis 01/26/2022   • Paroxysmal atrial fibrillation (HCC)    • Premature ventricular contractions    • Pulmonary hypertension (HCC)    • Renal insufficiency syndrome    • Type 2 diabetes mellitus (HCC)    • Uterine cancer (HCC)      Past Surgical History:  Past Surgical History:   Procedure Laterality Date   • AORTIC VALVE REPAIR/REPLACEMENT     • CATARACT EXTRACTION      1970, 1999   • CHOLECYSTECTOMY     • ENDOSCOPY  08/15/2014    no gross lesions in stomach/duodenum, erythrematous mucosa in stomach   • HYSTERECTOMY  2007   • STERNOTOMY        Home Meds:  (Not in a hospital admission)    Current Meds:     Current Facility-Administered  Medications:   •  HYDROmorphone (DILAUDID) injection 0.25 mg, 0.25 mg, Intravenous, Once, Jem Darling MD  •  [COMPLETED] Insert Peripheral IV, , , Once **AND** sodium chloride 0.9 % flush 10 mL, 10 mL, Intravenous, PRN, Jem Darling MD    Current Outpatient Medications:   •  amLODIPine (NORVASC) 2.5 MG tablet, Take 1 tablet by mouth Daily., Disp: 30 tablet, Rfl: 11  •  carvedilol (COREG) 12.5 MG tablet, Take 1 tablet by mouth 2 (Two) Times a Day., Disp: 60 tablet, Rfl: 11  •  cetirizine (zyrTEC) 5 MG tablet, Take 1 tablet by mouth Daily As Needed for Allergies., Disp: , Rfl:   •  cholecalciferol (VITAMIN D3) 25 MCG (1000 UT) tablet, Take 1 tablet by mouth Daily., Disp: , Rfl:   •  fluticasone-salmeterol (Advair HFA) 115-21 MCG/ACT inhaler, Inhale 2 puffs 2 (Two) Times a Day., Disp: 12 each, Rfl: 11  •  furosemide (LASIX) 40 MG tablet, Take 1 tablet by mouth Daily for 30 days., Disp: 30 tablet, Rfl: 0  •  gabapentin (NEURONTIN) 600 MG tablet, 1 tablet 2 (Two) Times a Day., Disp: , Rfl:   •  hydrALAZINE (APRESOLINE) 25 MG tablet, Take 2 tablets by mouth 2 (Two) Times a Day., Disp: , Rfl:   •  HYDROcodone-acetaminophen (NORCO) 7.5-325 MG per tablet, Take 1 tablet by mouth Every 6 (Six) Hours., Disp: , Rfl:   •  insulin glargine (LANTUS, SEMGLEE) 100 UNIT/ML injection, Inject 20 Units under the skin into the appropriate area as directed Every Night., Disp: , Rfl:   •  insulin lispro (humaLOG) 100 UNIT/ML injection, Inject 0-10 Units under the skin into the appropriate area as directed 3 (Three) Times a Day Before Meals. 2 units for every 50 > 150, Disp: , Rfl:   •  levothyroxine (SYNTHROID, LEVOTHROID) 25 MCG tablet, Take 2 tablets by mouth Daily., Disp: , Rfl:   •  lidocaine (LIDODERM) 5 %, Place 1 patch on the skin as directed by provider Daily. Remove & Discard patch within 12 hours or as directed by MD, Disp: 6 each, Rfl: 0  •  Loratadine (CLARITIN PO), Take  by mouth., Disp: , Rfl:   •  LORazepam (ATIVAN) 0.5  MG tablet, Take 1 tablet by mouth Every 8 (Eight) Hours As Needed for Anxiety., Disp: 10 tablet, Rfl: 0  •  montelukast (SINGULAIR) 10 MG tablet, Take 1 tablet by mouth Every Night., Disp: , Rfl:   •  ondansetron (ZOFRAN) 8 MG tablet, Take 1 tablet by mouth As Needed., Disp: , Rfl:   •  polyethylene glycol (MIRALAX) 17 GM/SCOOP powder, Take 17 g by mouth Daily., Disp: , Rfl:   •  predniSONE (DELTASONE) 5 MG tablet, Take 2 tablets by mouth Daily With Breakfast., Disp: 60 tablet, Rfl: 1  •  tamsulosin (FLOMAX) 0.4 MG capsule 24 hr capsule, Take 1 capsule by mouth every night at bedtime., Disp: , Rfl:   •  methocarbamol (ROBAXIN) 750 MG tablet, Take 1 tablet by mouth Every 8 (Eight) Hours As Needed for Muscle Spasms., Disp: , Rfl:   Allergies:  Allergies   Allergen Reactions   • Erythromycin Unknown (See Comments) and Other (See Comments)     Pt states she does not remember but it was many years ago  Pt states she does not remember but it was many years ago   • Statins Myalgia   • Cephalexin Other (See Comments) and Unknown (See Comments)     2022 Pt has tolerated ceftriaxone and cefepime during admission.   Pt states she does not remember reaction but it was many years ago   • Penicillins Rash   • Sulfa Antibiotics Itching and Rash     Social History:   Social History     Tobacco Use   • Smoking status: Former     Packs/day: 0.50     Types: Cigarettes     Quit date: 7/10/1969     Years since quittin.6   • Smokeless tobacco: Never   Substance Use Topics   • Alcohol use: No     Comment: caffeine use - coffee 2 cups daily      Family History:  Family History   Problem Relation Age of Onset   • Heart disease Mother    • Hypertension Mother    • Stroke Mother    • Diabetes Mother         mellitus   • Other Other         cardiovascular disorder          Review of Systems  See history of present illness and past medical history.    As described in the history of presenting illness.  Specifically patient denies any  "new onset incontinence of bowel and bladder      Vitals:   /60 (BP Location: Right arm, Patient Position: Lying)   Pulse 58   Temp 98.7 °F (37.1 °C)   Resp 16   Ht 144.8 cm (57\")   Wt 72.6 kg (160 lb)   SpO2 95%   BMI 34.62 kg/m²   I/O: No intake or output data in the 24 hours ending 03/07/23 1624  Exam:  Patient is examined using the personal protective equipment as per guidelines from infection control for this particular patient as enacted.  Hand washing was performed before and after patient interaction.  General Appearance:    Alert, cooperative, no distress, appears stated age   Head:    Normocephalic, without obvious abnormality, atraumatic   Eyes:   Legally blind   Ears:    Normal external ear canals, both ears   Nose:   Nares normal, septum midline, mucosa normal, no drainage    or sinus tenderness   Throat:   Lips, tongue, gums normal; oral mucosa pink and moist   Neck:   Supple, symmetrical, trachea midline, no adenopathy;     thyroid:  no enlargement/tenderness/nodules; no carotid    bruit or JVD   Back:     Symmetric, no curvature, ROM normal, no CVA tenderness   Lungs:     Clear to auscultation bilaterally, respirations unlabored   Chest Wall:    No tenderness or deformity    Heart:   S1-S2 regular   Abdomen:    Obese soft nontender   Extremities:   Extremities normal, atraumatic, no cyanosis or edema   Pulses:   Pulses palpable in all extremities; symmetric all extremities   Skin:  Bruising and ecchymotic changes noted   Neurologic:  Able to move upper and lower extremities with chronic weakness of the lower extremities.       Data Review:      I reviewed the patient's new clinical results.  Results from last 7 days   Lab Units 03/07/23  1153   WBC 10*3/mm3 9.18   HEMOGLOBIN g/dL 13.2   PLATELETS 10*3/mm3 41*     Results from last 7 days   Lab Units 03/07/23  1153   SODIUM mmol/L 137   POTASSIUM mmol/L 4.4   CHLORIDE mmol/L 102   CO2 mmol/L 25.1   BUN mg/dL 31*   CREATININE mg/dL 1.50* "   CALCIUM mg/dL 8.8   GLUCOSE mg/dL 79       Microbiology Results (last 10 days)     Procedure Component Value - Date/Time    Respiratory Panel PCR w/COVID-19(SARS-CoV-2) LATONIA/CHARLIE/ALEJANDRO/PAD/COR/MAD/PASQUALE In-House, NP Swab in UTM/VTM, 3-4 HR TAT - Swab, Nasopharynx [[id number]]  (Normal) Collected: 03/07/23 1142    Lab Status: Final result Specimen: Swab from Nasopharynx Updated: 03/07/23 1251     ADENOVIRUS, PCR Not Detected     Coronavirus 229E Not Detected     Coronavirus HKU1 Not Detected     Coronavirus NL63 Not Detected     Coronavirus OC43 Not Detected     COVID19 Not Detected     Human Metapneumovirus Not Detected     Human Rhinovirus/Enterovirus Not Detected     Influenza A PCR Not Detected     Influenza B PCR Not Detected     Parainfluenza Virus 1 Not Detected     Parainfluenza Virus 2 Not Detected     Parainfluenza Virus 3 Not Detected     Parainfluenza Virus 4 Not Detected     RSV, PCR Not Detected     Bordetella pertussis pcr Not Detected     Bordetella parapertussis PCR Not Detected     Chlamydophila pneumoniae PCR Not Detected     Mycoplasma pneumo by PCR Not Detected    Narrative:      In the setting of a positive respiratory panel with a viral infection PLUS a negative procalcitonin without other underlying concern for bacterial infection, consider observing off antibiotics or discontinuation of antibiotics and continue supportive care. If the respiratory panel is positive for atypical bacterial infection (Bordetella pertussis, Chlamydophila pneumoniae, or Mycoplasma pneumoniae), consider antibiotic de-escalation to target atypical bacterial infection.        ECG 12 Lead Other; weak   Final Result   HEART RATE= 63  bpm   RR Interval= 952  ms   NH Interval= 157  ms   P Horizontal Axis= 67  deg   P Front Axis= 0  deg   QRSD Interval= 106  ms   QT Interval= 430  ms   QRS Axis= 19  deg   T Wave Axis= 115  deg   - ABNORMAL ECG -   Sinus rhythm   Nonspecific repol abnormality, lateral leads   Minimal ST  elevation, anterior leads   When compared with ECG of 20-Dec-2022 17:06:29,   Significant rate decrease   Electronically Signed By: Pablo Sofia ([facility]) 07-Mar-2023 21:20:52   Date and Time of Study: 2023-03-07 12:07:58      SCANNED - TELEMETRY     Final Result            Assessment:  Active Hospital Problems    Diagnosis  POA   • **Bilateral low back pain without sciatica, unspecified chronicity [M54.50]  Yes   • Acute left-sided low back pain with left-sided sciatica [M54.42]  Unknown   • Chronic ITP (idiopathic thrombocytopenia) ([facility]) [D69.3]  Yes   • Closed fracture of fifth lumbar vertebra ([facility]) [S32.059A]  Yes   • Hypothyroidism [E03.9]  Yes   • Chronic pain disorder [G89.4]  Yes   • Macular degeneration [H35.30]  Yes   • Type 2 diabetes mellitus with hyperglycemia ([facility]) [E11.65]  Yes   • Paroxysmal atrial fibrillation ([facility]) [I48.0]  Yes   • Essential hypertension [I10]  Yes   • Legally blind [H54.8]  Yes   • Stage 4 chronic kidney disease ([facility]) [N18.4]  Yes     Formatting of this note might be different from the original.  Transitioned From:  Renal insufficiency         Medical decision making/care plan: See admitting orders  · Worsening left-sided back pain with pain going into her left leg and hip in the background of chronic back pain and known osteoporosis and prior L5 fracture currently being managed conservatively with brace and a physical therapy-rule out worsening compression fracture and radiculopathy or epidural hematoma.  Plan is to admit the patient neurosurgery service have already evaluated the patient follow-up on MRI and keep an eye on her neurological function and provide her with fall precautions.  Orthospine evaluation as per neurosurgery recommendation.  Continue with pain medication and muscle relaxant with close monitoring of her oxygen saturation with continuous pulse ox device.  · Thrombocytopenia easy bruising and recent couple of episodes of hemoptysis-monitor her hemoglobin  hematocrit and for recurrence of hematemesis or hemoptysis or worsening neurological function.  May need further work-up to rule out internal bleeding if she has no significant decline in her hemoglobin.  Consult hematology service.  · Legally blind with macular degeneration-provided with fall precautions.  · Hypothyroidism and history of hypothyroidism-continue her current thyroid regimen.  · Hypertension-continue antihypertensive regimen and avoid hypotensive episodes.  · Type 2 diabetes-provide with Accu-Cheks and sliding scale coverage and watch for hypoglycemia.  · Osteoporosis with L5 compression fracture managed conservatively-continue current pain management program.  · Chronic kidney disease-monitor renal function and avoid nephrotoxic agent and hypotensive episodes.  Dheeraj Lynch MD   3/7/2023  16:24 EST    Parts of this note may be an electronic transcription/translation of spoken language to printed text using the Dragon dictation system.

## 2023-03-07 NOTE — ED NOTES
Nursing report ED to floor  Helena Carmen  89 y.o.  female    HPI :   Chief Complaint   Patient presents with    Weakness - Generalized       Admitting doctor:   Dheeraj Lynch MD    Admitting diagnosis:   The primary encounter diagnosis was Acute on chronic bilateral low back pain. Diagnoses of Unable to walk, Generalized weakness, Elevated troponin, Thrombocytopenia (HCC), History of ITP, Chronic kidney disease, unspecified CKD stage, and Closed compression fracture of L5 lumbar vertebra, with delayed healing, subsequent encounter were also pertinent to this visit.    Code status:   Current Code Status       Date Active Code Status Order ID Comments User Context       Prior            Allergies:   Erythromycin, Statins, Cephalexin, Penicillins, and Sulfa antibiotics    Isolation:   No active isolations    Intake and Output  No intake or output data in the 24 hours ending 03/07/23 1555    Weight:       03/07/23  1120   Weight: 72.6 kg (160 lb)       Most recent vitals:   Vitals:    03/07/23 1300 03/07/23 1332 03/07/23 1402 03/07/23 1531   BP: 151/59 159/74 154/59 150/60   BP Location: Right arm   Right arm   Patient Position: Lying   Lying   Pulse: 60 62 61 58   Resp:    16   Temp:       SpO2: 94%  97% 95%   Weight:       Height:           Active LDAs/IV Access:   Lines, Drains & Airways       Active LDAs       Name Placement date Placement time Site Days    Peripheral IV 03/07/23 1153 Left Antecubital 03/07/23  1153  Antecubital  less than 1    External Urinary Catheter 03/07/23  1154  --  less than 1                    Labs (abnormal labs have a star):   Labs Reviewed   COMPREHENSIVE METABOLIC PANEL - Abnormal; Notable for the following components:       Result Value    BUN 31 (*)     Creatinine 1.50 (*)     eGFR 32.6 (*)     All other components within normal limits    Narrative:     GFR Normal >60  Chronic Kidney Disease <60  Kidney Failure <15    The GFR formula is only valid for adults with stable renal  function between ages 18 and 70.   TROPONIN - Abnormal; Notable for the following components:    HS Troponin T 77 (*)     All other components within normal limits    Narrative:     High Sensitive Troponin T Reference Range:  <10.0 ng/L- Negative Female for AMI  <15.0 ng/L- Negative Male for AMI  >=10 - Abnormal Female indicating possible myocardial injury.  >=15 - Abnormal Male indicating possible myocardial injury.   Clinicians would have to utilize clinical acumen, EKG, Troponin, and serial changes to determine if it is an Acute Myocardial Infarction or myocardial injury due to an underlying chronic condition.        CBC WITH AUTO DIFFERENTIAL - Abnormal; Notable for the following components:    MPV 12.5 (*)     Platelets 41 (*)     All other components within normal limits   BNP (IN-HOUSE) - Abnormal; Notable for the following components:    proBNP 2,394.0 (*)     All other components within normal limits    Narrative:     Among patients with dyspnea, NT-proBNP is highly sensitive for the detection of acute congestive heart failure. In addition NT-proBNP of <300 pg/ml effectively rules out acute congestive heart failure with 99% negative predictive value.    Results may be falsely decreased if patient taking Biotin.     MANUAL DIFFERENTIAL - Abnormal; Notable for the following components:    Neutrophil % 85.6 (*)     Lymphocyte % 9.3 (*)     Neutrophils Absolute 7.86 (*)     All other components within normal limits   HIGH SENSITIVITIY TROPONIN T 2HR - Abnormal; Notable for the following components:    HS Troponin T 67 (*)     Troponin T Delta -10 (*)     All other components within normal limits    Narrative:     High Sensitive Troponin T Reference Range:  <10.0 ng/L- Negative Female for AMI  <15.0 ng/L- Negative Male for AMI  >=10 - Abnormal Female indicating possible myocardial injury.  >=15 - Abnormal Male indicating possible myocardial injury.   Clinicians would have to utilize clinical acumen, EKG,  Troponin, and serial changes to determine if it is an Acute Myocardial Infarction or myocardial injury due to an underlying chronic condition.        RESPIRATORY PANEL PCR W/ COVID-19 (SARS-COV-2) LATONIA/CHARLIE/ALEJANDRO/PAD/COR/MAD/PASQUALE IN-HOUSE, NP SWAB IN [facility], 3-4 HR TAT - Normal    Narrative:     In the setting of a positive respiratory panel with a viral infection PLUS a negative procalcitonin without other underlying concern for bacterial infection, consider observing off antibiotics or discontinuation of antibiotics and continue supportive care. If the respiratory panel is positive for atypical bacterial infection (Bordetella pertussis, Chlamydophila pneumoniae, or Mycoplasma pneumoniae), consider antibiotic de-escalation to target atypical bacterial infection.   URINALYSIS W/ CULTURE IF INDICATED - Normal    Narrative:     In absence of clinical symptoms, the presence of pyuria, bacteria, and/or nitrites on the urinalysis result does not correlate with infection.  Urine microscopic not indicated.   LIPASE - Normal   POCT GLUCOSE FINGERSTICK - Normal   POCT GLUCOSE FINGERSTICK   CBC AND DIFFERENTIAL    Narrative:     The following orders were created for panel order CBC & Differential.  Procedure                               Abnormality         Status                     ---------                               -----------         ------                     CBC Auto Differential[[id number]]        Abnormal            Final result                 Please view results for these tests on the individual orders.       EKG:   ECG 12 Lead Other; weak   Preliminary Result   HEART RATE= 63  bpm   RR Interval= 952  ms   AZ Interval= 157  ms   P Horizontal Axis= 67  deg   P Front Axis= 0  deg   QRSD Interval= 106  ms   QT Interval= 430  ms   QRS Axis= 19  deg   T Wave Axis= 115  deg   - ABNORMAL ECG -   Sinus rhythm   Nonspecific repol abnormality, lateral leads   Minimal ST elevation, anterior leads   Electronically Signed By:     Date and Time of Study: 2023 12:07:58          Meds given in ED:   Medications   sodium chloride 0.9 % flush 10 mL (has no administration in time range)   HYDROmorphone (DILAUDID) injection 0.25 mg (0.25 mg Intravenous Given 3/7/23 1326)       Imaging results:  No radiology results for the last day    Ambulatory status:   - Assist    Social issues:   Social History     Socioeconomic History    Marital status:    Tobacco Use    Smoking status: Former     Packs/day: 0.50     Types: Cigarettes     Quit date: 7/10/1969     Years since quittin.6    Smokeless tobacco: Never   Vaping Use    Vaping Use: Never used   Substance and Sexual Activity    Alcohol use: No     Comment: caffeine use - coffee 2 cups daily    Drug use: No    Sexual activity: Defer       NIH Stroke Scale:         Herman Norton RN  23 15:55 EST

## 2023-03-07 NOTE — TELEPHONE ENCOUNTER
had phoned granddaughter to confirm NP appointment today, however patient's granddaughter stated patient was in a great deal of pain and she could not safely move her. Phoned patient's granddaughter at 's request. Patient's granddaughter confirmed that patient's pain started this morning. Patient had not fallen or done anything that caused the sudden onset of pain. Informed patient's granddaughter that Dr. Sherman recommended patient to be evaluated in emergency department due to the sudden and extreme nature of her pain. Recommended to patient's granddaughter that if she could not move patient, that EMS should be called in order to safely transport her to ED. Patient's granddaughter v/u.

## 2023-03-07 NOTE — ED NOTES
"Pt states for over the past week she has been weak all over. Pt states that her knees shake when she walks for the past few days. Pt states she has had pain all over her body since last night. Pt states her pain will shift to other areas of her body on and off. Pt states she has been coughing up some blood for the past two days. Pt states she feels \"as though something is caught in her throat.\" Pt states she has a hx polyps in her esophagus. Pt states she is able to eat and drink okay. Pt states since she had an L5 fracture in her back he has had back pain. Pt states her left side hurts the worst.   "

## 2023-03-07 NOTE — ED TRIAGE NOTES
Patient to ER via ems from home for generalized weakness x yesterday. Patient states she is weaker then normal and was unable to get up    Patient wearing mask this RN in PPE

## 2023-03-07 NOTE — CONSULTS
[facility], [place], operated by [facility] NEUROSURGERY CONSULT NOTE    Patient name: Helena Carmen  Referring Provider: Dr. Darling  Reason for Consultation: back and hip pain    Patient Care Team:  Mariah Hickman MD as PCP - General (Family Medicine)  Beth Butcher MD as Consulting Physician (Cardiology)  Rolando Wick MD as Consulting Physician (Nephrology)  Pablo Sherman Jr., MD as Consulting Physician (Hematology and Oncology)  Mango Arnold MD as Referring Physician (Internal Medicine)    Chief complaint: back and hip pain    Subjective .     History of present illness:    Patient is a 89 y.o.  female with known L5 compression fracture followed since August 2022.  Treated conservatively with bracing due to advanced age, medical comorbidities including ITP.  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  She was reevaluated in October due to ongoing low back pain with bilateral hip and anterior thigh pain.  No significant surgical pathology on MRI.  Fracture was stable.  Additionally, she was on Eliquis at that time for a recent DVT.  She was last seen in the office via telehealth visit 12/15/2022.  At that time she was continue to wear her brace and pain had improved.  She reports she was doing okay with intermittent back pain until several days ago when her back pain began to worsen with radiation into her left hip and anterior thigh.  He became significantly worse this morning and she was unable to bear weight without significant pain.  She has pain even at rest but worse with weightbearing.  She describes it as a sharp pain.  It has improved some with pain medications given here in the ER.  She denies any new bowel or bladder incontinence.  She does deal with some diarrhea that sometimes she is not aware of but denies any rectal numbness.  She has noticed some generalized weakness over the last month.  She did have her first dose of Prolia in the  last 3 weeks.  She denies any recent falls or injury.    History of uterine cancer, osteoporosis, former smoker.  She is now off of anticoagulation.  No prior spine surgery.  She/family had declined contrast due to chronic renal disease.    Review of Systems  Review of Systems   Genitourinary: Negative for enuresis.   Musculoskeletal: Positive for back pain and gait problem.   Neurological: Positive for weakness and numbness ( Chronic lower extremity).   Hematological: Bruises/bleeds easily.       History  PAST MEDICAL HISTORY  Past Medical History:   Diagnosis Date   • Aortic valve stenosis     s/p tissue AVR   • Back pain    • CKD (chronic kidney disease)    • Colitis due to Clostridioides difficile 01/26/2022   • Diastolic dysfunction     Grade 2 per echocardiogram 2013   • Diverticulosis    • Exertional shortness of breath    • Heart disease    • Hiatal hernia    • Hyperlipidemia    • Hypertension    • Hyperthyroidism    • Hypertriglyceridemia 05/31/2018   • Hypothyroidism    • L5 compression fracture (HCC) 08/2022   • Left ventricular hypertrophy    • Legally blind    • Liver disease    • Macular degeneration    • Mitral regurgitation    • Osteoarthritis of hip    • Osteoporosis    • Pancreatitis 01/26/2022   • Paroxysmal atrial fibrillation (HCC)    • Premature ventricular contractions    • Pulmonary hypertension (HCC)    • Renal insufficiency syndrome    • Type 2 diabetes mellitus (HCC)    • Uterine cancer (HCC)        PAST SURGICAL HISTORY  Past Surgical History:   Procedure Laterality Date   • AORTIC VALVE REPAIR/REPLACEMENT     • CATARACT EXTRACTION      1970, 1999   • CHOLECYSTECTOMY     • ENDOSCOPY  08/15/2014    no gross lesions in stomach/duodenum, erythrematous mucosa in stomach   • HYSTERECTOMY  2007   • STERNOTOMY         FAMILY HISTORY  Family History   Problem Relation Age of Onset   • Heart disease Mother    • Hypertension Mother    • Stroke Mother    • Diabetes Mother         mellitus   • Other  Other         cardiovascular disorder       SOCIAL HISTORY  Social History     Tobacco Use   • Smoking status: Former     Packs/day: 0.50     Types: Cigarettes     Quit date: 7/10/1969     Years since quittin.6   • Smokeless tobacco: Never   Vaping Use   • Vaping Use: Never used   Substance Use Topics   • Alcohol use: No     Comment: caffeine use - coffee 2 cups daily   • Drug use: No       retired  Lives alone but family has been assisting    Allergies:  Erythromycin, Statins, Cephalexin, Penicillins, and Sulfa antibiotics    MEDICATIONS:    Current Facility-Administered Medications:   •  [COMPLETED] Insert Peripheral IV, , , Once **AND** sodium chloride 0.9 % flush 10 mL, 10 mL, Intravenous, PRN, Jem Darling MD    Current Outpatient Medications:   •  amLODIPine (NORVASC) 2.5 MG tablet, Take 1 tablet by mouth Daily., Disp: 30 tablet, Rfl: 11  •  carvedilol (COREG) 12.5 MG tablet, Take 1 tablet by mouth 2 (Two) Times a Day., Disp: 60 tablet, Rfl: 11  •  cetirizine (zyrTEC) 5 MG tablet, Take 1 tablet by mouth Daily As Needed for Allergies., Disp: , Rfl:   •  cholecalciferol (VITAMIN D3) 25 MCG (1000 UT) tablet, Take 1 tablet by mouth Daily., Disp: , Rfl:   •  fluticasone-salmeterol (Advair HFA) 115-21 MCG/ACT inhaler, Inhale 2 puffs 2 (Two) Times a Day., Disp: 12 each, Rfl: 11  •  furosemide (LASIX) 40 MG tablet, Take 1 tablet by mouth Daily for 30 days., Disp: 30 tablet, Rfl: 0  •  gabapentin (NEURONTIN) 600 MG tablet, 1 tablet 2 (Two) Times a Day., Disp: , Rfl:   •  hydrALAZINE (APRESOLINE) 25 MG tablet, Take 2 tablets by mouth 2 (Two) Times a Day., Disp: , Rfl:   •  HYDROcodone-acetaminophen (NORCO) 7.5-325 MG per tablet, Take 1 tablet by mouth Every 6 (Six) Hours., Disp: , Rfl:   •  insulin glargine (LANTUS, SEMGLEE) 100 UNIT/ML injection, Inject 20 Units under the skin into the appropriate area as directed Every Night., Disp: , Rfl:   •  insulin lispro (humaLOG) 100 UNIT/ML injection, Inject 0-10  Units under the skin into the appropriate area as directed 3 (Three) Times a Day Before Meals. 2 units for every 50 > 150, Disp: , Rfl:   •  levothyroxine (SYNTHROID, LEVOTHROID) 25 MCG tablet, Take 2 tablets by mouth Daily., Disp: , Rfl:   •  lidocaine (LIDODERM) 5 %, Place 1 patch on the skin as directed by provider Daily. Remove & Discard patch within 12 hours or as directed by MD, Disp: 6 each, Rfl: 0  •  Loratadine (CLARITIN PO), Take  by mouth., Disp: , Rfl:   •  LORazepam (ATIVAN) 0.5 MG tablet, Take 1 tablet by mouth Every 8 (Eight) Hours As Needed for Anxiety., Disp: 10 tablet, Rfl: 0  •  montelukast (SINGULAIR) 10 MG tablet, Take 1 tablet by mouth Every Night., Disp: , Rfl:   •  ondansetron (ZOFRAN) 8 MG tablet, Take 1 tablet by mouth As Needed., Disp: , Rfl:   •  polyethylene glycol (MIRALAX) 17 GM/SCOOP powder, Take 17 g by mouth Daily., Disp: , Rfl:   •  predniSONE (DELTASONE) 5 MG tablet, Take 2 tablets by mouth Daily With Breakfast., Disp: 60 tablet, Rfl: 1  •  tamsulosin (FLOMAX) 0.4 MG capsule 24 hr capsule, Take 1 capsule by mouth every night at bedtime., Disp: , Rfl:   •  methocarbamol (ROBAXIN) 750 MG tablet, Take 1 tablet by mouth Every 8 (Eight) Hours As Needed for Muscle Spasms., Disp: , Rfl:       Objective     Results Review:  LABS:  Results from last 7 days   Lab Units 03/07/23  1153   WBC 10*3/mm3 9.18   HEMOGLOBIN g/dL 13.2   HEMATOCRIT % 40.8   PLATELETS 10*3/mm3 41*     Results from last 7 days   Lab Units 03/07/23  1153   SODIUM mmol/L 137   POTASSIUM mmol/L 4.4   CHLORIDE mmol/L 102   CO2 mmol/L 25.1   BUN mg/dL 31*   CREATININE mg/dL 1.50*   CALCIUM mg/dL 8.8   BILIRUBIN mg/dL 0.5   ALK PHOS U/L 58   ALT (SGPT) U/L 19   AST (SGOT) U/L 15   GLUCOSE mg/dL 79     Respiratory PCR-negative  Urinalysis unremarkable      DIAGNOSTICS:  Lumbar x-rays reveal known compression deformity at L5 which to my review in comparison does not appear significantly different.  There is a slight different  and angle of the imaging which makes the fracture appear slightly worsened as compared to prior study in December.  No evidence of any other fractures or alignment changes.    Results Review:   I reviewed the patient's new clinical results.  I personally viewed and interpreted the patient's lumbar xrays    Vital Signs   Temp:  [98.7 °F (37.1 °C)] 98.7 °F (37.1 °C)  Heart Rate:  [58-66] 58  Resp:  [16] 16  BP: (149-174)/(56-74) 150/60    Physical Exam:  Physical Exam  Vitals reviewed.   Constitutional:       Comments: Body mass index is 34.62 kg/m².     Pulmonary:      Effort: Pulmonary effort is normal.   Musculoskeletal:      Thoracic back: Tenderness ( Over small area of bruise right posterior lateral upper thorax) present. No bony tenderness.      Lumbar back: Tenderness ( Generalized lower back.  No SI joint tenderness on the left) present. No bony tenderness. Negative right straight leg raise test and negative left straight leg raise test.      Comments:   Pain in left anterior thigh and groin with rolling internal and external rotation of hip   Skin:     General: Skin is warm and dry.      Findings: Bruising ( Multiple areas of bruising bilateral upper extremities and lower extremities.  Petechial hemorrhaging on shins.  Small bruise right posterior lateral thoracic spine) present.   Neurological:      General: No focal deficit present.      Mental Status: She is alert.      Deep Tendon Reflexes:      Reflex Scores:       Patellar reflexes are 0 on the right side and 0 on the left side.  Psychiatric:         Speech: Speech normal.       Neurologic Exam     Mental Status   Speech: speech is normal   Knowledge: good.     Motor Exam   Muscle bulk: normal  Overall muscle tone: normal  5/5 bilateral lower extremity     Sensory Exam   Right leg light touch: normal  Left leg light touch: normal    Gait, Coordination, and Reflexes     Gait  Gait: (Not tested due to pain)    Reflexes   Right patellar: 0  Left patellar:  "0  Right plantar: normal  Left plantar: normal  Right ankle clonus: absent  Left ankle clonus: absent      Assessment & Plan       Bilateral low back pain without sciatica, unspecified chronicity    Acute left-sided low back pain with left-sided sciatica    Patient with acute on chronic back pain.  Known L5 fracture followed conservatively and treated in bracing.  She continues to wear this intermittently for comfort.  She has never been pain-free but states her pain has increased over the last couple of days and significantly worse this morning with radiation into her left hip and anterior thigh with standing.  She has ITP and there was some voiced concern about possibl spine hemorrhage.  Her exam is without any hyperreflexia and she has normal strength and sensation in her legs.  She denies any saddle paresthesia or new incontinence.  She has issues with diarrhea and does not always realize she is going but does not have any urinary incontinence.  She is not specifically tender at her L5 region or in the midline spine.  She has a small bruise in the upper left posterior thorax but it does not appear to be overlying the spine itself and she denies any injuries.  She does have pain with rolling the left hip internally and externally.  MRI lumbar spine has been ordered by ER provider.  We will follow-up on these results; however, I do not think that there is any emergent neurosurgical issue.    PLAN:   Follow up on MRI lumbar once complete  May need orthopedic eval if MRI negative    I discussed the patient's findings and my recommendations with patient, family and Dr. Darling and Dr. Erickson    During patient visit, I utilized appropriate personal protective equipment including mask and gloves.  Mask used was standard procedure mask. Appropriate PPE was worn during the entire visit.  Hand hygiene was completed before and after.     Chanda Pineda, APRN  03/07/23  16:05 EST    \"Dictated utilizing Dragon dictation\".  "

## 2023-03-08 LAB
ALBUMIN SERPL-MCNC: 3.6 G/DL (ref 3.5–5.2)
ALBUMIN/GLOB SERPL: 1.5 G/DL
ALP SERPL-CCNC: 51 U/L (ref 39–117)
ALT SERPL W P-5'-P-CCNC: 14 U/L (ref 1–33)
ANION GAP SERPL CALCULATED.3IONS-SCNC: 11 MMOL/L (ref 5–15)
AST SERPL-CCNC: 9 U/L (ref 1–32)
BASOPHILS # BLD AUTO: 0.02 10*3/MM3 (ref 0–0.2)
BASOPHILS NFR BLD AUTO: 0.3 % (ref 0–1.5)
BILIRUB SERPL-MCNC: 0.6 MG/DL (ref 0–1.2)
BUN SERPL-MCNC: 34 MG/DL (ref 8–23)
BUN/CREAT SERPL: 22.1 (ref 7–25)
CALCIUM SPEC-SCNC: 8.1 MG/DL (ref 8.2–9.6)
CHLORIDE SERPL-SCNC: 104 MMOL/L (ref 98–107)
CO2 SERPL-SCNC: 24 MMOL/L (ref 22–29)
CREAT SERPL-MCNC: 1.54 MG/DL (ref 0.57–1)
DEPRECATED RDW RBC AUTO: 47.1 FL (ref 37–54)
EGFRCR SERPLBLD CKD-EPI 2021: 31.5 ML/MIN/1.73
EOSINOPHIL # BLD AUTO: 0.05 10*3/MM3 (ref 0–0.4)
EOSINOPHIL NFR BLD AUTO: 0.8 % (ref 0.3–6.2)
ERYTHROCYTE [DISTWIDTH] IN BLOOD BY AUTOMATED COUNT: 15 % (ref 12.3–15.4)
GLOBULIN UR ELPH-MCNC: 2.4 GM/DL
GLUCOSE BLDC GLUCOMTR-MCNC: 167 MG/DL (ref 70–130)
GLUCOSE BLDC GLUCOMTR-MCNC: 176 MG/DL (ref 70–130)
GLUCOSE BLDC GLUCOMTR-MCNC: 191 MG/DL (ref 70–130)
GLUCOSE BLDC GLUCOMTR-MCNC: 292 MG/DL (ref 70–130)
GLUCOSE SERPL-MCNC: 191 MG/DL (ref 65–99)
HBA1C MFR BLD: 6.2 % (ref 4.8–5.6)
HCT VFR BLD AUTO: 39 % (ref 34–46.6)
HGB BLD-MCNC: 12.4 G/DL (ref 12–15.9)
LYMPHOCYTES # BLD AUTO: 1.47 10*3/MM3 (ref 0.7–3.1)
LYMPHOCYTES NFR BLD AUTO: 24.4 % (ref 19.6–45.3)
MCH RBC QN AUTO: 27.6 PG (ref 26.6–33)
MCHC RBC AUTO-ENTMCNC: 31.8 G/DL (ref 31.5–35.7)
MCV RBC AUTO: 86.7 FL (ref 79–97)
MONOCYTES # BLD AUTO: 0.59 10*3/MM3 (ref 0.1–0.9)
MONOCYTES NFR BLD AUTO: 9.8 % (ref 5–12)
NEUTROPHILS NFR BLD AUTO: 3.86 10*3/MM3 (ref 1.7–7)
NEUTROPHILS NFR BLD AUTO: 64.2 % (ref 42.7–76)
PLATELET # BLD AUTO: 38 10*3/MM3 (ref 140–450)
PLATELET # BLD AUTO: 40 10*3/MM3 (ref 140–450)
PLATELETS.RETICULATED NFR BLD AUTO: 10.9 % (ref 0.9–6.5)
PMV BLD AUTO: 12.5 FL (ref 6–12)
POTASSIUM SERPL-SCNC: 4.4 MMOL/L (ref 3.5–5.2)
PROT SERPL-MCNC: 6 G/DL (ref 6–8.5)
RBC # BLD AUTO: 4.5 10*6/MM3 (ref 3.77–5.28)
SODIUM SERPL-SCNC: 139 MMOL/L (ref 136–145)
WBC NRBC COR # BLD: 6.02 10*3/MM3 (ref 3.4–10.8)

## 2023-03-08 PROCEDURE — 63710000001 INSULIN LISPRO (HUMAN) PER 5 UNITS: Performed by: INTERNAL MEDICINE

## 2023-03-08 PROCEDURE — 97163 PT EVAL HIGH COMPLEX 45 MIN: CPT | Performed by: PHYSICAL THERAPIST

## 2023-03-08 PROCEDURE — 82962 GLUCOSE BLOOD TEST: CPT

## 2023-03-08 PROCEDURE — 25010000002 ONDANSETRON PER 1 MG: Performed by: INTERNAL MEDICINE

## 2023-03-08 PROCEDURE — 94664 DEMO&/EVAL PT USE INHALER: CPT

## 2023-03-08 PROCEDURE — 85025 COMPLETE CBC W/AUTO DIFF WBC: CPT | Performed by: INTERNAL MEDICINE

## 2023-03-08 PROCEDURE — 83036 HEMOGLOBIN GLYCOSYLATED A1C: CPT | Performed by: INTERNAL MEDICINE

## 2023-03-08 PROCEDURE — 85055 RETICULATED PLATELET ASSAY: CPT | Performed by: INTERNAL MEDICINE

## 2023-03-08 PROCEDURE — G0378 HOSPITAL OBSERVATION PER HR: HCPCS

## 2023-03-08 PROCEDURE — 25010000002 HYDROMORPHONE PER 4 MG: Performed by: INTERNAL MEDICINE

## 2023-03-08 PROCEDURE — 97530 THERAPEUTIC ACTIVITIES: CPT | Performed by: PHYSICAL THERAPIST

## 2023-03-08 PROCEDURE — 80053 COMPREHEN METABOLIC PANEL: CPT | Performed by: INTERNAL MEDICINE

## 2023-03-08 PROCEDURE — 97166 OT EVAL MOD COMPLEX 45 MIN: CPT

## 2023-03-08 PROCEDURE — 97530 THERAPEUTIC ACTIVITIES: CPT

## 2023-03-08 PROCEDURE — 94799 UNLISTED PULMONARY SVC/PX: CPT

## 2023-03-08 PROCEDURE — 99222 1ST HOSP IP/OBS MODERATE 55: CPT | Performed by: INTERNAL MEDICINE

## 2023-03-08 PROCEDURE — 99232 SBSQ HOSP IP/OBS MODERATE 35: CPT | Performed by: NURSE PRACTITIONER

## 2023-03-08 PROCEDURE — 94761 N-INVAS EAR/PLS OXIMETRY MLT: CPT

## 2023-03-08 PROCEDURE — 63710000001 PREDNISONE PER 5 MG: Performed by: INTERNAL MEDICINE

## 2023-03-08 RX ORDER — METHOCARBAMOL 750 MG/1
750 TABLET, FILM COATED ORAL EVERY 8 HOURS PRN
Status: DISCONTINUED | OUTPATIENT
Start: 2023-03-08 | End: 2023-03-14 | Stop reason: HOSPADM

## 2023-03-08 RX ORDER — GABAPENTIN 300 MG/1
300 CAPSULE ORAL ONCE
Status: COMPLETED | OUTPATIENT
Start: 2023-03-08 | End: 2023-03-08

## 2023-03-08 RX ORDER — GABAPENTIN 300 MG/1
600 CAPSULE ORAL EVERY 12 HOURS SCHEDULED
Status: DISCONTINUED | OUTPATIENT
Start: 2023-03-08 | End: 2023-03-14 | Stop reason: HOSPADM

## 2023-03-08 RX ADMIN — GABAPENTIN 300 MG: 300 CAPSULE ORAL at 15:06

## 2023-03-08 RX ADMIN — PREDNISONE 10 MG: 10 TABLET ORAL at 12:10

## 2023-03-08 RX ADMIN — INSULIN LISPRO 2 UNITS: 100 INJECTION, SOLUTION INTRAVENOUS; SUBCUTANEOUS at 18:23

## 2023-03-08 RX ADMIN — GABAPENTIN 300 MG: 300 CAPSULE ORAL at 12:10

## 2023-03-08 RX ADMIN — GABAPENTIN 600 MG: 300 CAPSULE ORAL at 21:21

## 2023-03-08 RX ADMIN — INSULIN LISPRO 2 UNITS: 100 INJECTION, SOLUTION INTRAVENOUS; SUBCUTANEOUS at 12:27

## 2023-03-08 RX ADMIN — HYDRALAZINE HYDROCHLORIDE 50 MG: 50 TABLET ORAL at 12:10

## 2023-03-08 RX ADMIN — BUDESONIDE AND FORMOTEROL FUMARATE DIHYDRATE 2 PUFF: 160; 4.5 AEROSOL RESPIRATORY (INHALATION) at 06:49

## 2023-03-08 RX ADMIN — MONTELUKAST SODIUM 10 MG: 10 TABLET, FILM COATED ORAL at 21:21

## 2023-03-08 RX ADMIN — LEVOTHYROXINE SODIUM 50 MCG: 0.05 TABLET ORAL at 06:28

## 2023-03-08 RX ADMIN — HYDROCODONE BITARTRATE AND ACETAMINOPHEN 1 TABLET: 7.5; 325 TABLET ORAL at 07:06

## 2023-03-08 RX ADMIN — ONDANSETRON 4 MG: 2 INJECTION INTRAMUSCULAR; INTRAVENOUS at 21:21

## 2023-03-08 RX ADMIN — HYDROCODONE BITARTRATE AND ACETAMINOPHEN 1 TABLET: 7.5; 325 TABLET ORAL at 18:23

## 2023-03-08 RX ADMIN — HYDROCODONE BITARTRATE AND ACETAMINOPHEN 1 TABLET: 7.5; 325 TABLET ORAL at 12:26

## 2023-03-08 RX ADMIN — Medication 10 ML: at 11:45

## 2023-03-08 RX ADMIN — Medication 10 ML: at 22:06

## 2023-03-08 RX ADMIN — LORAZEPAM 0.5 MG: 0.5 TABLET ORAL at 21:38

## 2023-03-08 RX ADMIN — HYDRALAZINE HYDROCHLORIDE 50 MG: 50 TABLET ORAL at 21:16

## 2023-03-08 RX ADMIN — HYDROMORPHONE HYDROCHLORIDE 0.25 MG: 1 INJECTION, SOLUTION INTRAMUSCULAR; INTRAVENOUS; SUBCUTANEOUS at 12:10

## 2023-03-08 RX ADMIN — INSULIN GLARGINE-YFGN 20 UNITS: 100 INJECTION, SOLUTION SUBCUTANEOUS at 21:22

## 2023-03-08 RX ADMIN — CARVEDILOL 12.5 MG: 12.5 TABLET, FILM COATED ORAL at 12:10

## 2023-03-08 RX ADMIN — BUDESONIDE AND FORMOTEROL FUMARATE DIHYDRATE 2 PUFF: 160; 4.5 AEROSOL RESPIRATORY (INHALATION) at 20:12

## 2023-03-08 RX ADMIN — LIDOCAINE 1 PATCH: 50 PATCH CUTANEOUS at 21:16

## 2023-03-08 RX ADMIN — CARVEDILOL 12.5 MG: 12.5 TABLET, FILM COATED ORAL at 21:17

## 2023-03-08 NOTE — DISCHARGE PLACEMENT REQUEST
"Helena Carmen (89 y.o. Female)     Date of Birth   02/20/1931    Social Security Number       Address   [address] [place] [address]    Home Phone   [phone]    MRN   [id number]       Taoism   Amish    Marital Status                               Admission Date   3/7/23    Admission Type   Emergency    Admitting Provider   Dheeraj Lynch MD    Attending Provider   Juan Lennon MD    Department, Room/Bed   [address], [address]1       Discharge Date       Discharge Disposition       Discharge Destination                               Attending Provider: Juan Lennon MD    Allergies: Erythromycin, Statins, Cephalexin, Penicillins, Sulfa Antibiotics    Isolation: None   Infection: None   Code Status: CPR    Ht: 144.8 cm (57.01\")   Wt: 75.1 kg (165 lb 9.1 oz)    Admission Cmt: None   Principal Problem: Bilateral low back pain without sciatica, unspecified chronicity [M54.50]                 Active Insurance as of 3/7/2023     Primary Coverage     Payor Plan Insurance Group Employer/Plan Group    MEDICARE MEDICARE A & B      Payor Plan Address Payor Plan Phone Number Payor Plan Fax Number Effective Dates    [address] [phone]  2/1/1996 - None Entered    [facility] [id number]       Subscriber Name Subscriber Birth Date Member ID       HELENA CARMEN 2/20/1931 [id number]           Secondary Coverage     Payor Plan Insurance Group Employer/Plan Group     FOR LIFE  FOR LIFE  SUP       Payor Plan Address Payor Plan Phone Number Payor Plan Fax Number Effective Dates    [address] [phone]  4/5/2016 - None Entered    [facility] [id number]       Subscriber Name Subscriber Birth Date Member ID       HELENA CARMEN 2/20/1931 [id number]                 Emergency Contacts      (Rel.) Home Phone Work Phone Mobile Phone    Brandon carmen (Son) [phone] -- --    Radha Hoyos (Daughter) [phone] -- [phone]    Margaret Loco " (Daughter) [phone] -- [phone]    ARAM SPAIN (Grandchild) -- -- [phone]

## 2023-03-08 NOTE — NURSING NOTE
Wound/Ostomy: Consult received regarding skin issue on Coccyx area. Patient is alert, oriented, able to turn with assistance, upon assessment is observed redness, blanchable  area on the Coccyx, related to moisture, Z Guard ordered.  Please add waffle overlay mattress.  Rn notified.  Nursing intervention have been implemented.   Please re-consult for further needs.

## 2023-03-08 NOTE — PLAN OF CARE
Goal Outcome Evaluation:VSS, MRI of spine resulted. Pain control provided. Will continue to monitor.

## 2023-03-08 NOTE — PROGRESS NOTES
"DAILY PROGRESS NOTE  [facility]    Patient Identification:  Name: Helena Carmen  Age: 89 y.o.  Sex: female  :  1931  MRN: [id number]         Primary Care Physician: Mariah Hickman MD    Subjective:  Interval History:She she complains of pain.    Objective:    Scheduled Meds:amLODIPine, 2.5 mg, Oral, Daily  budesonide-formoterol, 2 puff, Inhalation, BID - RT  carvedilol, 12.5 mg, Oral, BID  cetirizine, 10 mg, Oral, Daily  cholecalciferol, 1,000 Units, Oral, Daily  furosemide, 40 mg, Oral, Daily  gabapentin, 600 mg, Oral, Q12H  hydrALAZINE, 50 mg, Oral, BID  HYDROcodone-acetaminophen, 1 tablet, Oral, Q6H  insulin glargine, 20 Units, Subcutaneous, Nightly  insulin lispro, 0-9 Units, Subcutaneous, TID AC  levothyroxine, 50 mcg, Oral, Q AM  lidocaine, 1 patch, Transdermal, Q24H  montelukast, 10 mg, Oral, Nightly  predniSONE, 10 mg, Oral, Daily With Breakfast  sodium chloride, 10 mL, Intravenous, Q12H  tamsulosin, 0.4 mg, Oral, Daily      Continuous Infusions:     Vital signs in last 24 hours:  Temp:  [97.6 °F (36.4 °C)-98 °F (36.7 °C)] 98 °F (36.7 °C)  Heart Rate:  [57-66] 57  Resp:  [18] 18  BP: (143-178)/(59-82) 156/61    Intake/Output:    Intake/Output Summary (Last 24 hours) at 3/8/2023 1538  Last data filed at 3/8/2023 0920  Gross per 24 hour   Intake --   Output 1225 ml   Net -1225 ml       Exam:  /61 (BP Location: Right arm, Patient Position: Lying)   Pulse 57   Temp 98 °F (36.7 °C) (Oral)   Resp 18   Ht 144.8 cm (57.01\")   Wt 75.1 kg (165 lb 9.1 oz)   SpO2 94%   BMI 35.82 kg/m²     General Appearance:    Alert, cooperative, no distress   Head:    Normocephalic, without obvious abnormality, atraumatic   Eyes:       Throat:   Lips, tongue, gums normal   Neck:   Supple, symmetrical, trachea midline, no JVD   Lungs:     Clear to auscultation bilaterally, respirations unlabored   Chest Wall:    No tenderness or deformity    Heart:    Regular rate and rhythm, S1 and S2 " normal, no murmur,no  Rub or gallop   Abdomen:     Soft, nontender, bowel sounds active, no masses, no organomegaly    Extremities:   Extremities normal, atraumatic, no cyanosis or edema   Pulses:      Skin:   Skin is warm and dry,  no rashes or palpable lesions   Neurologic:   Very weak      Lab Results (last 72 hours)     Procedure Component Value Units Date/Time    POC Glucose Once [[id number]]  (Abnormal) Collected: 03/08/23 1108    Specimen: Blood Updated: 03/08/23 1111     Glucose 167 mg/dL      Comment: Meter: [id number] : [id number] Panchobayron Bhagat ELIZABETH       CBC Auto Differential [[id number]]  (Abnormal) Collected: 03/08/23 0621    Specimen: Blood Updated: 03/08/23 0722     WBC 6.02 10*3/mm3      RBC 4.50 10*6/mm3      Hemoglobin 12.4 g/dL      Hematocrit 39.0 %      MCV 86.7 fL      MCH 27.6 pg      MCHC 31.8 g/dL      RDW 15.0 %      RDW-SD 47.1 fl      MPV 12.5 fL      Platelets 40 10*3/mm3      Neutrophil % 64.2 %      Lymphocyte % 24.4 %      Monocyte % 9.8 %      Eosinophil % 0.8 %      Basophil % 0.3 %      Neutrophils, Absolute 3.86 10*3/mm3      Lymphocytes, Absolute 1.47 10*3/mm3      Monocytes, Absolute 0.59 10*3/mm3      Eosinophils, Absolute 0.05 10*3/mm3      Basophils, Absolute 0.02 10*3/mm3     Comprehensive Metabolic Panel [[id number]]  (Abnormal) Collected: 03/08/23 0621    Specimen: Blood Updated: 03/08/23 0654     Glucose 191 mg/dL      BUN 34 mg/dL      Creatinine 1.54 mg/dL      Sodium 139 mmol/L      Potassium 4.4 mmol/L      Chloride 104 mmol/L      CO2 24.0 mmol/L      Calcium 8.1 mg/dL      Total Protein 6.0 g/dL      Albumin 3.6 g/dL      ALT (SGPT) 14 U/L      AST (SGOT) 9 U/L      Alkaline Phosphatase 51 U/L      Total Bilirubin 0.6 mg/dL      Globulin 2.4 gm/dL      A/G Ratio 1.5 g/dL      BUN/Creatinine Ratio 22.1     Anion Gap 11.0 mmol/L      eGFR 31.5 mL/min/1.73     Narrative:      GFR Normal >60  Chronic Kidney Disease <60  Kidney Failure <15    The GFR formula is only valid  for adults with stable renal function between ages 18 and 70.    Hemoglobin A1c [[id number]]  (Abnormal) Collected: 03/08/23 0621    Specimen: Blood Updated: 03/08/23 0648     Hemoglobin A1C 6.20 %     Narrative:      Hemoglobin A1C Ranges:    Increased Risk for Diabetes  5.7% to 6.4%  Diabetes                     >= 6.5%  Diabetic Goal                < 7.0%    POC Glucose Once [[id number]]  (Abnormal) Collected: 03/08/23 0641    Specimen: Blood Updated: 03/08/23 0643     Glucose 176 mg/dL      Comment: Meter: [id number] : [id number] Mark Arrington       POC Glucose Once [[id number]]  (Abnormal) Collected: 03/07/23 2140    Specimen: Blood Updated: 03/07/23 2146     Glucose 180 mg/dL      Comment: Meter: [id number] : [id number] Jonathan Braun ELIZABETH       High Sensitivity Troponin T 2Hr [[id number]]  (Abnormal) Collected: 03/07/23 1441    Specimen: Blood Updated: 03/07/23 1520     HS Troponin T 67 ng/L      Troponin T Delta -10 ng/L     Narrative:      High Sensitive Troponin T Reference Range:  <10.0 ng/L- Negative Female for AMI  <15.0 ng/L- Negative Male for AMI  >=10 - Abnormal Female indicating possible myocardial injury.  >=15 - Abnormal Male indicating possible myocardial injury.   Clinicians would have to utilize clinical acumen, EKG, Troponin, and serial changes to determine if it is an Acute Myocardial Infarction or myocardial injury due to an underlying chronic condition.         High Sensitivity Troponin T [[id number]]  (Abnormal) Collected: 03/07/23 1153    Specimen: Blood Updated: 03/07/23 1324     HS Troponin T 77 ng/L     Narrative:      High Sensitive Troponin T Reference Range:  <10.0 ng/L- Negative Female for AMI  <15.0 ng/L- Negative Male for AMI  >=10 - Abnormal Female indicating possible myocardial injury.  >=15 - Abnormal Male indicating possible myocardial injury.   Clinicians would have to utilize clinical acumen, EKG, Troponin, and serial changes to determine if it is an Acute Myocardial  Infarction or myocardial injury due to an underlying chronic condition.         Comprehensive Metabolic Panel [[id number]]  (Abnormal) Collected: 03/07/23 1153    Specimen: Blood Updated: 03/07/23 1312     Glucose 79 mg/dL      BUN 31 mg/dL      Creatinine 1.50 mg/dL      Sodium 137 mmol/L      Potassium 4.4 mmol/L      Chloride 102 mmol/L      CO2 25.1 mmol/L      Calcium 8.8 mg/dL      Total Protein 6.5 g/dL      Albumin 4.1 g/dL      ALT (SGPT) 19 U/L      AST (SGOT) 15 U/L      Alkaline Phosphatase 58 U/L      Total Bilirubin 0.5 mg/dL      Globulin 2.4 gm/dL      A/G Ratio 1.7 g/dL      BUN/Creatinine Ratio 20.7     Anion Gap 9.9 mmol/L      eGFR 32.6 mL/min/1.73     Narrative:      GFR Normal >60  Chronic Kidney Disease <60  Kidney Failure <15    The GFR formula is only valid for adults with stable renal function between ages 18 and 70.    Lipase [[id number]]  (Normal) Collected: 03/07/23 1153    Specimen: Blood Updated: 03/07/23 1312     Lipase 37 U/L     BNP [[id number]]  (Abnormal) Collected: 03/07/23 1153    Specimen: Blood Updated: 03/07/23 1310     proBNP 2,394.0 pg/mL     Narrative:      Among patients with dyspnea, NT-proBNP is highly sensitive for the detection of acute congestive heart failure. In addition NT-proBNP of <300 pg/ml effectively rules out acute congestive heart failure with 99% negative predictive value.    Results may be falsely decreased if patient taking Biotin.      Respiratory Panel PCR w/COVID-19(SARS-CoV-2) LATONIA/CHARLIE/ALEJANDRO/PAD/COR/MAD/PASQUALE In-House, NP Swab in [facility], 3-4 HR TAT - Swab, Nasopharynx [[id number]]  (Normal) Collected: 03/07/23 1142    Specimen: Swab from Nasopharynx Updated: 03/07/23 1251     ADENOVIRUS, PCR Not Detected     Coronavirus 229E Not Detected     Coronavirus HKU1 Not Detected     Coronavirus NL63 Not Detected     Coronavirus OC43 Not Detected     COVID19 Not Detected     Human Metapneumovirus Not Detected     Human Rhinovirus/Enterovirus Not Detected      Influenza A PCR Not Detected     Influenza B PCR Not Detected     Parainfluenza Virus 1 Not Detected     Parainfluenza Virus 2 Not Detected     Parainfluenza Virus 3 Not Detected     Parainfluenza Virus 4 Not Detected     RSV, PCR Not Detected     Bordetella pertussis pcr Not Detected     Bordetella parapertussis PCR Not Detected     Chlamydophila pneumoniae PCR Not Detected     Mycoplasma pneumo by PCR Not Detected    Narrative:      In the setting of a positive respiratory panel with a viral infection PLUS a negative procalcitonin without other underlying concern for bacterial infection, consider observing off antibiotics or discontinuation of antibiotics and continue supportive care. If the respiratory panel is positive for atypical bacterial infection (Bordetella pertussis, Chlamydophila pneumoniae, or Mycoplasma pneumoniae), consider antibiotic de-escalation to target atypical bacterial infection.    Manual Differential [[id number]]  (Abnormal) Collected: 03/07/23 1153    Specimen: Blood Updated: 03/07/23 1239     Neutrophil % 85.6 %      Lymphocyte % 9.3 %      Monocyte % 5.2 %      Neutrophils Absolute 7.86 10*3/mm3      Lymphocytes Absolute 0.85 10*3/mm3      Monocytes Absolute 0.48 10*3/mm3      RBC Morphology Normal     Smudge Cells Slight/1+     Platelet Morphology Normal    CBC & Differential [[id number]]  (Abnormal) Collected: 03/07/23 1153    Specimen: Blood Updated: 03/07/23 1239    Narrative:      The following orders were created for panel order CBC & Differential.  Procedure                               Abnormality         Status                     ---------                               -----------         ------                     CBC Auto Differential[[id number]]        Abnormal            Final result                 Please view results for these tests on the individual orders.    CBC Auto Differential [[id number]]  (Abnormal) Collected: 03/07/23 1153    Specimen: Blood Updated: 03/07/23 1239      WBC 9.18 10*3/mm3      RBC 4.59 10*6/mm3      Hemoglobin 13.2 g/dL      Hematocrit 40.8 %      MCV 88.9 fL      MCH 28.8 pg      MCHC 32.4 g/dL      RDW 15.3 %      RDW-SD 49.6 fl      MPV 12.5 fL      Platelets 41 10*3/mm3     Urinalysis With Culture If Indicated - Straight Cath [[id number]]  (Normal) Collected: 03/07/23 1138    Specimen: Urine from Straight Cath Updated: 03/07/23 1228     Color, UA Yellow     Appearance, UA Clear     pH, UA 6.5     Specific Gravity, UA 1.007     Glucose, UA Negative     Ketones, UA Negative     Bilirubin, UA Negative     Blood, UA Negative     Protein, UA Negative     Leuk Esterase, UA Negative     Nitrite, UA Negative     Urobilinogen, UA 1.0 E.U./dL    Narrative:      In absence of clinical symptoms, the presence of pyuria, bacteria, and/or nitrites on the urinalysis result does not correlate with infection.  Urine microscopic not indicated.    POC Glucose Once [[id number]]  (Normal) Collected: 03/07/23 1140    Specimen: Blood Updated: 03/07/23 1142     Glucose 84 mg/dL      Comment: Meter: [id number] : [id number] Chad JOHNSON           Data Review:  Results from last 7 days   Lab Units 03/08/23  0621 03/07/23  1153   SODIUM mmol/L 139 137   POTASSIUM mmol/L 4.4 4.4   CHLORIDE mmol/L 104 102   CO2 mmol/L 24.0 25.1   BUN mg/dL 34* 31*   CREATININE mg/dL 1.54* 1.50*   GLUCOSE mg/dL 191* 79   CALCIUM mg/dL 8.1* 8.8     Results from last 7 days   Lab Units 03/08/23  0621 03/07/23  1153   WBC 10*3/mm3 6.02 9.18   HEMOGLOBIN g/dL 12.4 13.2   HEMATOCRIT % 39.0 40.8   PLATELETS 10*3/mm3 40* 41*         Results from last 7 days   Lab Units 03/08/23  0621   HEMOGLOBIN A1C % 6.20*     Lab Results   Lab Value Date/Time    TROPONINT 67 (C) 03/07/2023 1441    TROPONINT 77 (C) 03/07/2023 1153    TROPONINT 0.052 (C) 12/20/2022 2113    TROPONINT 0.051 (C) 12/20/2022 1350    TROPONINT 0.010 08/07/2022 1046    TROPONINT <0.010 08/01/2022 1017    TROPONINT 0.017 07/31/2022 0920     TROPONINT 0.037 (C) 07/30/2022 1034    TROPONINT 0.034 (C) 03/04/2022 0645    TROPONINT 0.024 03/02/2022 1632         Results from last 7 days   Lab Units 03/08/23  0621 03/07/23  1153   ALK PHOS U/L 51 58   BILIRUBIN mg/dL 0.6 0.5   ALT (SGPT) U/L 14 19   AST (SGOT) U/L 9 15         Results from last 7 days   Lab Units 03/08/23  0621   HEMOGLOBIN A1C % 6.20*     Glucose   Date/Time Value Ref Range Status   03/08/2023 1108 167 (H) 70 - 130 mg/dL Final     Comment:     Meter: [id number] : [id number] Quynh Bhagat NA   03/08/2023 0641 176 (H) 70 - 130 mg/dL Final     Comment:     Meter: [id number] : [id number] Mark Arrington   03/07/2023 2140 180 (H) 70 - 130 mg/dL Final     Comment:     Meter: [id number] : [id number] Jonathan Braun NA   03/07/2023 1140 84 70 - 130 mg/dL Final     Comment:     Meter: [id number] : [id number] Chad JOHNSON           Past Medical History:   Diagnosis Date   • Aortic valve stenosis     s/p tissue AVR   • Back pain    • CKD (chronic kidney disease)    • Colitis due to Clostridioides difficile 01/26/2022   • Diastolic dysfunction     Grade 2 per echocardiogram 2013   • Diverticulosis    • Exertional shortness of breath    • Heart disease    • Hiatal hernia    • Hyperlipidemia    • Hypertension    • Hyperthyroidism    • Hypertriglyceridemia 05/31/2018   • Hypothyroidism    • L5 compression fracture (HCC) 08/2022   • Left ventricular hypertrophy    • Legally blind    • Liver disease    • Macular degeneration    • Mitral regurgitation    • Osteoarthritis of hip    • Osteoporosis    • Pancreatitis 01/26/2022   • Paroxysmal atrial fibrillation (HCC)    • Premature ventricular contractions    • Pulmonary hypertension (HCC)    • Renal insufficiency syndrome    • Type 2 diabetes mellitus (HCC)    • Uterine cancer (HCC)        Assessment:  Active Hospital Problems    Diagnosis  POA   • **Bilateral low back pain without sciatica, unspecified chronicity [M54.50]  Yes   • Acute  left-sided low back pain with left-sided sciatica [M54.42]  Unknown   • Chronic ITP (idiopathic thrombocytopenia) ([facility]) [D69.3]  Yes   • Closed fracture of fifth lumbar vertebra ([facility]) [S32.059A]  Yes   • Hypothyroidism [E03.9]  Yes   • Chronic pain disorder [G89.4]  Yes   • Macular degeneration [H35.30]  Yes   • Type 2 diabetes mellitus with hyperglycemia ([facility]) [E11.65]  Yes   • Paroxysmal atrial fibrillation ([facility]) [I48.0]  Yes   • Essential hypertension [I10]  Yes   • Legally blind [H54.8]  Yes   • Stage 4 chronic kidney disease ([facility]) [N18.4]  Yes      Resolved Hospital Problems   No resolved problems to display.       Plan:  Neurosurgery and hematology consults noted.   Follow lab.  Pain control.    Juan Lennon MD  3/8/2023  15:38 EST

## 2023-03-08 NOTE — CONSULTS
Pain management  Consulted regarding the lumbar epidural steroid injection.  Patient's platelet count is 40,000 today.  I spoke with neurosurgery and discussed that for elderly patients and neuraxial procedures, a platelet count of 100,000 is the goal.  Below that the risks are probably not worth the potential reward.

## 2023-03-08 NOTE — PLAN OF CARE
Goal Outcome Evaluation:  Plan of Care Reviewed With: patient, family           Outcome Evaluation: The patient is a 89 year old female admitted to the hospital with B LBP without sciatica. Prior to admission, the patient states that she was independent with ambulation in the home with a rolling walker but her daughter and granddaughter came to assist with other household tasks. Upon evaluation today, she requires min assist for bed mobility and is able to ambulate 4 ft with rw and CGA. Gait very slow and unsteady. She continues to require additional skilled PT to address  limitations in strength, balance, and endurance to return to her previous level of function. She would benefit from SNF at [place].

## 2023-03-08 NOTE — CASE MANAGEMENT/SOCIAL WORK
Discharge Planning Assessment  [facility]     Patient Name: Helena Carmen  MRN: [id number]  Today's Date: 3/8/2023    Admit Date: 3/7/2023    Plan: Home with current HH and family assistance vs SNF   Discharge Needs Assessment     Row Name 03/08/23 1412       Living Environment    People in Home alone    Unique Family Situation Family is with her most of the time only alone for very short periods    Current Living Arrangements home    Primary Care Provided by child(edison);other (see comments);self    Family Caregiver if Needed child(edison), adult;grandchild(edison), adult    Quality of Family Relationships involved;helpful    Able to Return to Prior Arrangements yes       Resource/Environmental Concerns    Resource/Environmental Concerns none       Transition Planning    Patient/Family Anticipates Transition to home with family;home with help/services;inpatient rehabilitation facility    Patient/Family Anticipated Services at Transition skilled nursing;rehabilitation services;home health care    Transportation Anticipated family or friend will provide       Discharge Needs Assessment    Readmission Within the Last 30 Days no previous admission in last 30 days    Equipment Currently Used at Home shower chair;grab bar;walker, rolling;wheelchair;commode    Concerns to be Addressed adjustment to diagnosis/illness    Anticipated Changes Related to Illness inability to care for self    Equipment Needed After Discharge none               Discharge Plan     Row Name 03/08/23 1415       Plan    Plan Home with current HH and family assistance vs SNF    Roadmap to Recovery Yes    Patient/Family in Agreement with Plan yes    Plan Comments Spoke to patient and daughter Radha at bedside, face sheet and pharmacy information verified. Pt lives alone in one level home, he granddaughter stays with her during the day and her children switch off who stays at night, she is only alone for very short periods of time if at all. She needs  assist with most house hold things but can manage toileting, dressing etc. She does need help with bathing. She is current with [facility] and has been to [facility] and [facility] in the past. If needed she would return to either but [facility] would be first choice, she is okay with referral as an option at d/c, She has w/c, elevated commode seat, s/c, GB and rolling walker. Planning for spine injection tomorrow per JUAN LUIS, pt daughter states if pt is in less pain she may be able to return home with HH and assist and they will transport.  CCP will follow- Ngoc GARAY              Continued Care and Services - Admitted Since 3/7/2023    Coordination has not been started for this encounter.     Selected Continued Care - Prior Encounters Includes continued care and service providers with selected services from prior encounters from 12/7/2022 to 3/8/2023    Discharged on 12/25/2022 Admission date: 12/20/2022 - Discharge disposition: Home-Health Care [facility] [place]    Home Medical Care     Service Provider Selected Services Address Phone Fax Patient Preferred    [facility] [address] [place] [address], [address], [facility] [address] [phone] --                    Expected Discharge Date and Time     Expected Discharge Date Expected Discharge Time    Mar 8, 2023          Demographic Summary     Row Name 03/08/23 1411       General Information    Admission Type observation               Functional Status     Row Name 03/08/23 1411       Functional Status    Usual Activity Tolerance good    Current Activity Tolerance poor       Assessment of Health Literacy    Health Literacy Good       Functional Status, IADL    Medications assistive person    Meal Preparation assistive person    Housekeeping assistive person    Laundry assistive person    Shopping assistive person       Mental Status    General Appearance WDL WDL       Mental Status Summary    Recent Changes in Mental Status/Cognitive  Functioning no changes               Psychosocial    No documentation.                Abuse/Neglect    No documentation.                Legal     Row Name 03/08/23 1412       Financial/Legal    Who Manages Finances if Patient Unable children               Substance Abuse    No documentation.                Patient Forms    No documentation.                   Ngoc Frost RN

## 2023-03-08 NOTE — THERAPY EVALUATION
Patient Name: Helena Carmen  : 1931    MRN: [id number]                              Today's Date: 3/8/2023       Admit Date: 3/7/2023    Visit Dx:     ICD-10-CM ICD-9-CM   1. Acute on chronic bilateral low back pain  M54.50 724.2   2. Unable to walk  R26.2 719.7   3. Generalized weakness  R53.1 780.79   4. Elevated troponin  R77.8 790.6   5. Thrombocytopenia (HCC)  D69.6 287.5   6. History of ITP  Z86.2 V12.3   7. Chronic kidney disease, unspecified CKD stage  N18.9 585.9   8. Closed compression fracture of L5 lumbar vertebra, with delayed healing, subsequent encounter  S32.050G V54.17     Patient Active Problem List   Diagnosis   • Aortic valve stenosis   • Fatigue   • MI (mitral incompetence)   • PVC (premature ventricular contraction)   • Legally blind   • Essential hypertension   • Hypertriglyceridemia   • Pulmonary hypertension (HCC)   • Paroxysmal atrial fibrillation (HCC)   • Anxiety   • Stage 4 chronic kidney disease (HCC)   • Chronic pain disorder   • Degeneration of lumbar intervertebral disc   • Type 2 diabetes mellitus with hyperglycemia (HCC)   • Esophageal reflux   • Gastroparesis   • Hiatal hernia   • Iatrogenic hypothyroidism   • Macular degeneration   • Malignant neoplasm of uterus (HCC)   • Osteoarthritis of knee   • Peripheral nerve disease   • Secondary hyperparathyroidism (HCC)   • Thrombocytopenia (HCC)   • Chronic heart failure with preserved ejection fraction (HCC)   • Other cirrhosis of liver (HCC)   • Hypothyroidism   • Nonrheumatic tricuspid valve regurgitation   • Anemia, chronic disease   • Closed fracture of fifth lumbar vertebra (HCC)   • Closed fracture of fifth lumbar vertebra, unspecified fracture morphology, initial encounter ([facility])   • Diabetic polyneuropathy associated with type 2 diabetes mellitus (HCC)   • Chronic ITP (idiopathic thrombocytopenia) ([facility])   • Chronic midline low back pain with bilateral sciatica   • Acute deep vein thrombosis of calf, bilateral (HCC)   •  Bilateral low back pain without sciatica, unspecified chronicity   • Acute left-sided low back pain with left-sided sciatica     Past Medical History:   Diagnosis Date   • Aortic valve stenosis     s/p tissue AVR   • Back pain    • CKD (chronic kidney disease)    • Colitis due to Clostridioides difficile 01/26/2022   • Diastolic dysfunction     Grade 2 per echocardiogram 2013   • Diverticulosis    • Exertional shortness of breath    • Heart disease    • Hiatal hernia    • Hyperlipidemia    • Hypertension    • Hyperthyroidism    • Hypertriglyceridemia 05/31/2018   • Hypothyroidism    • L5 compression fracture (HCC) 08/2022   • Left ventricular hypertrophy    • Legally blind    • Liver disease    • Macular degeneration    • Mitral regurgitation    • Osteoarthritis of hip    • Osteoporosis    • Pancreatitis 01/26/2022   • Paroxysmal atrial fibrillation (HCC)    • Premature ventricular contractions    • Pulmonary hypertension (HCC)    • Renal insufficiency syndrome    • Type 2 diabetes mellitus (HCC)    • Uterine cancer (HCC)      Past Surgical History:   Procedure Laterality Date   • AORTIC VALVE REPAIR/REPLACEMENT     • CATARACT EXTRACTION      1970, 1999   • CHOLECYSTECTOMY     • ENDOSCOPY  08/15/2014    no gross lesions in stomach/duodenum, erythrematous mucosa in stomach   • HYSTERECTOMY  2007   • STERNOTOMY        General Information     Row Name 03/08/23 0928          OT Time and Intention    Document Type evaluation  -KG     Mode of Treatment individual therapy;occupational therapy  -KG     Row Name 03/08/23 0928          General Information    Patient Profile Reviewed yes  -KG     Prior Level of Function --  Independent w/ ADLs and uses RW for functional mobility at baseline. Uses shower chair for bathing and grab bars for bathing and tub t//fs. Also has a handicap height toilet.  -KG     Existing Precautions/Restrictions no known precautions/restrictions  -KG     Barriers to Rehab none identified  -KG     Row  Name 03/08/23 0928          Living Environment    People in Home --  Lives w/ adult granddaughter in a 1 story home. 1 step to enter.  -KG     Row Name 03/08/23 0928          Cognition    Orientation Status (Cognition) oriented to;person;place;time  -KG     Row Name 03/08/23 0928          Safety Issues, Functional Mobility    Impairments Affecting Function (Mobility) endurance/activity tolerance;strength  -KG           User Key  (r) = Recorded By, (t) = Taken By, (c) = Cosigned By    Initials Name Provider Type    John Choudhary OT Occupational Therapist                 Mobility/ADL's     Row Name 03/08/23 0930          Bed Mobility    Bed Mobility supine-sit;sit-supine  -KG     Supine-Sit [place] (Bed Mobility) minimum assist (75% patient effort)  -KG     Sit-Supine [place] (Bed Mobility) minimum assist (75% patient effort)  -KG     Assistive Device (Bed Mobility) bed rails  -KG     Row Name 03/08/23 0930          Transfers    Transfers sit-stand transfer  -KG     Row Name 03/08/23 0930          Sit-Stand Transfer    Sit-Stand [place] (Transfers) minimum assist (75% patient effort)  -KG     Assistive Device (Sit-Stand Transfers) walker, front-wheeled  -KG     Row Name 03/08/23 0930          Functional Mobility    Functional Mobility- Ind. Level --  Attempted to step to HOB but unable to perform 2/2 fatigue  -KG     Row Name 03/08/23 0930          Activities of Daily Living    BADL Assessment/Intervention lower body dressing  -KG     Row Name 03/08/23 0930          Lower Body Dressing Assessment/Training    [place] Level (Lower Body Dressing) don;socks;maximum assist (25% patient effort)  -KG     Position (Lower Body Dressing) edge of bed sitting  -KG           User Key  (r) = Recorded By, (t) = Taken By, (c) = Cosigned By    Initials Name Provider Type    John Choudhary OT Occupational Therapist               Obj/Interventions     Row Name 03/08/23 0931          Sensory Assessment  (Somatosensory)    Sensory Assessment (Somatosensory) sensation intact  -KG     Row Name 03/08/23 0931          Vision Assessment/Intervention    Visual Impairment/Limitations WNL  -KG     Row Name 03/08/23 0931          Range of Motion Comprehensive    General Range of Motion no range of motion deficits identified  -KG     Row Name 03/08/23 0931          Strength Comprehensive (MMT)    General Manual Muscle Testing (MMT) Assessment upper extremity strength deficits identified  BUE strength 3/5 at shoulders, 4-/5 at wrists/elbows  -KG     Row Name 03/08/23 0931          Balance    Balance Assessment --  F+ static sitting balance, F dynamic sitting balance, F static standing balance.  -KG           User Key  (r) = Recorded By, (t) = Taken By, (c) = Cosigned By    Initials Name Provider Type    KG John Garibay OT Occupational Therapist               Goals/Plan     Row Name 03/08/23 0937          Bed Mobility Goal 1 (OT)    Activity/Assistive Device (Bed Mobility Goal 1, OT) sit to supine;supine to sit  -KG     [place] Level/Cues Needed (Bed Mobility Goal 1, OT) standby assist  -KG     Time Frame (Bed Mobility Goal 1, OT) short term goal (STG);2 weeks  -KG     Progress/Outcomes (Bed Mobility Goal 1, OT) new goal  -KG     Row Name 03/08/23 0937          Transfer Goal 1 (OT)    Activity/Assistive Device (Transfer Goal 1, OT) sit-to-stand/stand-to-sit;bed-to-chair/chair-to-bed;toilet  -KG     [place] Level/Cues Needed (Transfer Goal 1, OT) contact guard required  -KG     Time Frame (Transfer Goal 1, OT) short term goal (STG);2 weeks  -KG     Progress/Outcome (Transfer Goal 1, OT) new goal  -KG     Row Name 03/08/23 0937          Dressing Goal 1 (OT)    Activity/Device (Dressing Goal 1, OT) lower body dressing  -KG     [place]/Cues Needed (Dressing Goal 1, OT) minimum assist (75% or more patient effort)  -KG     Time Frame (Dressing Goal 1, OT) short term goal (STG);2 weeks  -KG     Progress/Outcome  (Dressing Goal 1, OT) new goal  -KG     Row Name 03/08/23 0937          Toileting Goal 1 (OT)    Activity/Device (Toileting Goal 1, OT) toileting skills, all  -KG     [place] Level/Cues Needed (Toileting Goal 1, OT) minimum assist (75% or more patient effort)  -KG     Time Frame (Toileting Goal 1, OT) short term goal (STG);2 weeks  -KG     Progress/Outcome (Toileting Goal 1, OT) new goal  -KG     Row Name 03/08/23 0937          Grooming Goal 1 (OT)    Activity/Device (Grooming Goal 1, OT) oral care;wash face, hands  -KG     [place] (Grooming Goal 1, OT) contact guard required  -KG     Time Frame (Grooming Goal 1, OT) short term goal (STG);2 weeks  -KG     Progress/Outcome (Grooming Goal 1, OT) new goal  -KG     Row Name 03/08/23 0937          Therapy Assessment/Plan (OT)    Planned Therapy Interventions (OT) activity tolerance training;functional balance retraining;occupation/activity based interventions;ROM/therapeutic exercise  -KG           User Key  (r) = Recorded By, (t) = Taken By, (c) = Cosigned By    Initials Name Provider Type    KG John Garibay, OT Occupational Therapist               Clinical Impression     Row Name 03/08/23 0932          Pain Assessment    Pretreatment Pain Rating 7/10  -KG     Posttreatment Pain Rating 5/10  -KG     Pain Location - back  -KG     Pain Intervention(s) Repositioned  -KG     Row Name 03/08/23 0932          Plan of Care Review    Plan of Care Reviewed With patient  -KG     Progress no change  -KG     Outcome Evaluation Pt admitted to [facility] for generalized weakness/fatigue. Reports she was unable to get up at home. Lives w/ adult granddaughter in a 1 story home w/ 1 step to enter. I/Mod I w/ ADLs and functional mobility at baseline. Requiring Min A for bed mobility. SBA for static sitting EOB. Max A for LBD. Performed sit>stand from EOB w/ Min A using RW. Maintained static stand for 1 minute w/ CGA using RW, flexed posture. Attempted to step to HOB, however was  unable to perform 2/2 fatigue. Pt presents w/ diminished strength/activtiy tolerance and requiring increased assistance w/ ADLs. Rec SNF for subacute rehab at d/c. OT will continue to follow.  -KG     Row Name 03/08/23 0932          Therapy Assessment/Plan (OT)    Rehab Potential (OT) good, to achieve stated therapy goals  -KG     Criteria for Skilled Therapeutic Interventions Met (OT) skilled treatment is necessary  -KG     Therapy Frequency (OT) 5 times/wk  -KG     Row Name 03/08/23 0932          Therapy Plan Review/Discharge Plan (OT)    Anticipated Discharge Disposition (OT) skilled nursing facility  -KG     Row Name 03/08/23 0932          Vital Signs    Pre Patient Position Supine  -KG     Intra Patient Position Standing  -KG     Post Patient Position Supine  -KG     Row Name 03/08/23 0932          Positioning and Restraints    Pre-Treatment Position in bed  -KG     Post Treatment Position bed  -KG     In Bed notified nsg;supine;exit alarm on;call light within reach;encouraged to call for assist  -KG           User Key  (r) = Recorded By, (t) = Taken By, (c) = Cosigned By    Initials Name Provider Type    KG John Garibay, OT Occupational Therapist               Outcome Measures     Row Name 03/08/23 0938          How much help from another is currently needed...    Putting on and taking off regular lower body clothing? 2  -KG     Bathing (including washing, rinsing, and drying) 2  -KG     Toileting (which includes using toilet bed pan or urinal) 2  -KG     Putting on and taking off regular upper body clothing 3  -KG     Taking care of personal grooming (such as brushing teeth) 3  -KG     Eating meals 4  -KG     AM-PAC 6 Clicks Score (OT) 16  -KG     Row Name 03/08/23 0938          Modified [place] Scale    Modified [place] Scale 4 - Moderately severe disability.  Unable to walk without assistance, and unable to attend to own bodily needs without assistance.  -KG     Row Name 03/08/23 0938          Functional  Assessment    Outcome Measure Options AM-PAC 6 Clicks Daily Activity (OT);Modified [place]  -KG           User Key  (r) = Recorded By, (t) = Taken By, (c) = Cosigned By    Initials Name Provider Type    John Choudhary OT Occupational Therapist                Occupational Therapy Education     Title: PT OT SLP Therapies (In Progress)     Topic: Occupational Therapy (Not Started)     Point: ADL training (Not Started)     Description:   Instruct learner(s) on proper safety adaptation and remediation techniques during self care or transfers.   Instruct in proper use of assistive devices.              Learner Progress:  Not documented in this visit.          Point: Home exercise program (Not Started)     Description:   Instruct learner(s) on appropriate technique for monitoring, assisting and/or progressing therapeutic exercises/activities.              Learner Progress:  Not documented in this visit.          Point: Precautions (Not Started)     Description:   Instruct learner(s) on prescribed precautions during self-care and functional transfers.              Learner Progress:  Not documented in this visit.          Point: Body mechanics (Not Started)     Description:   Instruct learner(s) on proper positioning and spine alignment during self-care, functional mobility activities and/or exercises.              Learner Progress:  Not documented in this visit.                          OT Recommendation and Plan  Planned Therapy Interventions (OT): activity tolerance training, functional balance retraining, occupation/activity based interventions, ROM/therapeutic exercise  Therapy Frequency (OT): 5 times/wk  Plan of Care Review  Plan of Care Reviewed With: patient  Progress: no change  Outcome Evaluation: Pt admitted to [facility] for generalized weakness/fatigue. Reports she was unable to get up at home. Lives w/ adult granddaughter in a 1 story home w/ 1 step to enter. I/Mod I w/ ADLs and functional mobility at baseline.  Requiring Min A for bed mobility. SBA for static sitting EOB. Max A for LBD. Performed sit>stand from EOB w/ Min A using RW. Maintained static stand for 1 minute w/ CGA using RW, flexed posture. Attempted to step to HOB, however was unable to perform 2/2 fatigue. Pt presents w/ diminished strength/activtiy tolerance and requiring increased assistance w/ ADLs. Rec SNF for subacute rehab at d/c. OT will continue to follow.     Time Calculation:    Time Calculation- OT     Row Name 03/08/23 0940             Time Calculation- OT    OT Start Time 0825  -KG      OT Stop Time 0850  -KG      OT Time Calculation (min) 25 min  -KG      Total Timed Code Minutes- OT 20 minute(s)  -KG      OT Received On 03/08/23  -KG      OT - Next Appointment 03/09/23  -KG      OT Goal Re-Cert Due Date 03/22/23  -KG         Timed Charges    59832 - OT Therapeutic Activity Minutes 20  -KG         Untimed Charges    OT Eval/Re-eval Minutes 5  -KG         Total Minutes    Timed Charges Total Minutes 20  -KG      Untimed Charges Total Minutes 5  -KG       Total Minutes 25  -KG            User Key  (r) = Recorded By, (t) = Taken By, (c) = Cosigned By    Initials Name Provider Type    KG John Garibay OT Occupational Therapist              Therapy Charges for Today     Code Description Service Date Service Provider Modifiers Qty    83893099142 HC OT EVAL MOD COMPLEXITY 2 3/8/2023 John Garibay OT GO 1    96973865843 HC OT THERAPEUTIC ACT EA 15 MIN 3/8/2023 John Garibay OT GO 1               John Garibay OT  3/8/2023

## 2023-03-08 NOTE — PROGRESS NOTES
[facility]OSURGERY PROGRESS NOTE      CC: Back and bilateral leg pain L > R      Subjective     Interval History: Reports back pain somewhat improved with pain medications.  Still has leg pain particularly in the left thigh.  She worked with physical therapy today and was minimally able to ambulate due to pain    ROS:  Constitutional: No fever, chills  MS: back pain  Neuro: Leg pain  : No difficulty voiding, no incontinence    Objective     Vital signs in last 24 hours:  Temp:  [97.6 °F (36.4 °C)-98 °F (36.7 °C)] 98 °F (36.7 °C)  Heart Rate:  [57-66] 57  Resp:  [16-18] 18  BP: (143-178)/(59-82) 156/61    Intake/Output this shift:  I/O this shift:  In: -   Out: 600 [Urine:600]    LABS:  Results from last 7 days   Lab Units 03/08/23  0621 03/07/23  1153   WBC 10*3/mm3 6.02 9.18   HEMOGLOBIN g/dL 12.4 13.2   HEMATOCRIT % 39.0 40.8   PLATELETS 10*3/mm3 40* 41*     Results from last 7 days   Lab Units 03/08/23  0621 03/07/23  1153   SODIUM mmol/L 139 137   POTASSIUM mmol/L 4.4 4.4   CHLORIDE mmol/L 104 102   CO2 mmol/L 24.0 25.1   BUN mg/dL 34* 31*   CREATININE mg/dL 1.54* 1.50*   CALCIUM mg/dL 8.1* 8.8   BILIRUBIN mg/dL 0.6 0.5   ALK PHOS U/L 51 58   ALT (SGPT) U/L 14 19   AST (SGOT) U/L 9 15   GLUCOSE mg/dL 191* 79     IMAGING STUDIES:  MRI lumbar spine-multilevel degenerative changes including both canal and foraminal stenosis.  Most significant finding is at L5 with persistent vertebral body edema consistent with fracture.  Body height loss is minimally worsened from prior study.  Neuroforaminal height loss has increased resulting in further compromise of exiting nerve roots bilaterally.  There is also fluid in the facet joints right more so than left at L5/S1.    I personally viewed and interpreted the patient's MRI lumbar.    Meds reviewed/changed: Yes  Gabapentin 300 mg twice daily  Norco 7.5 mg p.o. every 6 hours  Lidoderm patch every 24 hours  Prednisone 10 mg daily  Dilaudid 0.25 mg IV every  2 hours as needed-1 dose      Physical Exam:    General:   Awake, alert.  Lying in bed.  Family at bedside.  No acute distress.  Back:    Left straight leg raise causes low back and lateral leg pain  Motor: Normal strength in legs.    Station and Gait:             Per PT note requires min assist for bed mobility and is able to ambulate 4 ft with rw and CGA. Gait very slow and unsteady.    Extremities:   Wearing SCD      Assessment & Plan     ASSESSMENT:      Bilateral low back pain without sciatica, unspecified chronicity    Legally blind    Essential hypertension    Paroxysmal atrial fibrillation (HCC)    Stage 4 chronic kidney disease (HCC)    Chronic pain disorder    Type 2 diabetes mellitus with hyperglycemia (HCC)    Macular degeneration    Hypothyroidism    Closed fracture of fifth lumbar vertebra (HCC)    Chronic ITP (idiopathic thrombocytopenia) (HCC)    Acute left-sided low back pain with left-sided sciatica    Patient with ongoing back and leg pain.  MRI confirms ongoing edema within the L5 vertebral body as well as increased neuroforaminal narrowing bilateral L5/S1 which may be the source of her leg pain.  She also has facet arthropathy and joint fluid consistent with progression of degenerative changes and likely micro motion.  Patient is a poor surgical candidate for many reasons including idiopathic thrombocytopenia, osteoporosis, and advanced age.  Even a lesser invasive surgical procedure such as kyphoplasty may help her back pain but will not help her leg pain.  Patient is agreeable to considering lumbar epidural injection.  Discussed with Dr. Erickson who agrees this may be beneficial.  I have reached out to anesthesiology to determine what her platelet count needs to be for safe epidural injection and then will consult hematology service to assist to reach that platelet count so she can obtain lumbar epidural.  N.p.o. after midnight with the hopes that we may be able to make this happen tomorrow.  She  "still has her brace at home and her daughter will bring that in for her to wear when she is up.    PLAN:  Need PLT count >100K for LESI- hematology consult pending- nurse to discuss with them   VAHID in a.m.  N.p.o. midnight for BHUPENDRAI  Daughter to bring in brace for patient to wear when out of bed  Continue therapies     I discussed the patient's findings and my recommendations with patient, family, nursing staff and Dr. Erickson    During patient visit, I utilized appropriate personal protective equipment including mask and gloves.  Mask used was standard procedure mask. Appropriate PPE was worn during the entire visit.  Hand hygiene was completed before and after.      LOS: 0 days       Chanda Pineda, APRN  3/8/2023  15:05 EST    \"Dictated utilizing Dragon dictation\".      "

## 2023-03-08 NOTE — THERAPY EVALUATION
Patient Name: Helena Carmen  : 1931    MRN: [id number]                              Today's Date: 3/8/2023       Admit Date: 3/7/2023    Visit Dx:     ICD-10-CM ICD-9-CM   1. Acute on chronic bilateral low back pain  M54.50 724.2   2. Unable to walk  R26.2 719.7   3. Generalized weakness  R53.1 780.79   4. Elevated troponin  R77.8 790.6   5. Thrombocytopenia (HCC)  D69.6 287.5   6. History of ITP  Z86.2 V12.3   7. Chronic kidney disease, unspecified CKD stage  N18.9 585.9   8. Closed compression fracture of L5 lumbar vertebra, with delayed healing, subsequent encounter  S32.050G V54.17     Patient Active Problem List   Diagnosis   • Aortic valve stenosis   • Fatigue   • MI (mitral incompetence)   • PVC (premature ventricular contraction)   • Legally blind   • Essential hypertension   • Hypertriglyceridemia   • Pulmonary hypertension (HCC)   • Paroxysmal atrial fibrillation (HCC)   • Anxiety   • Stage 4 chronic kidney disease (HCC)   • Chronic pain disorder   • Degeneration of lumbar intervertebral disc   • Type 2 diabetes mellitus with hyperglycemia (HCC)   • Esophageal reflux   • Gastroparesis   • Hiatal hernia   • Iatrogenic hypothyroidism   • Macular degeneration   • Malignant neoplasm of uterus (HCC)   • Osteoarthritis of knee   • Peripheral nerve disease   • Secondary hyperparathyroidism (HCC)   • Thrombocytopenia (HCC)   • Chronic heart failure with preserved ejection fraction (HCC)   • Other cirrhosis of liver (HCC)   • Hypothyroidism   • Nonrheumatic tricuspid valve regurgitation   • Anemia, chronic disease   • Closed fracture of fifth lumbar vertebra (HCC)   • Closed fracture of fifth lumbar vertebra, unspecified fracture morphology, initial encounter ([facility])   • Diabetic polyneuropathy associated with type 2 diabetes mellitus (HCC)   • Chronic ITP (idiopathic thrombocytopenia) ([facility])   • Chronic midline low back pain with bilateral sciatica   • Acute deep vein thrombosis of calf, bilateral (HCC)   •  Bilateral low back pain without sciatica, unspecified chronicity   • Acute left-sided low back pain with left-sided sciatica     Past Medical History:   Diagnosis Date   • Aortic valve stenosis     s/p tissue AVR   • Back pain    • CKD (chronic kidney disease)    • Colitis due to Clostridioides difficile 01/26/2022   • Diastolic dysfunction     Grade 2 per echocardiogram 2013   • Diverticulosis    • Exertional shortness of breath    • Heart disease    • Hiatal hernia    • Hyperlipidemia    • Hypertension    • Hyperthyroidism    • Hypertriglyceridemia 05/31/2018   • Hypothyroidism    • L5 compression fracture (HCC) 08/2022   • Left ventricular hypertrophy    • Legally blind    • Liver disease    • Macular degeneration    • Mitral regurgitation    • Osteoarthritis of hip    • Osteoporosis    • Pancreatitis 01/26/2022   • Paroxysmal atrial fibrillation (HCC)    • Premature ventricular contractions    • Pulmonary hypertension (HCC)    • Renal insufficiency syndrome    • Type 2 diabetes mellitus (HCC)    • Uterine cancer (HCC)      Past Surgical History:   Procedure Laterality Date   • AORTIC VALVE REPAIR/REPLACEMENT     • CATARACT EXTRACTION      1970, 1999   • CHOLECYSTECTOMY     • ENDOSCOPY  08/15/2014    no gross lesions in stomach/duodenum, erythrematous mucosa in stomach   • HYSTERECTOMY  2007   • STERNOTOMY        General Information     Row Name 03/08/23 1023          Physical Therapy Time and Intention    Document Type evaluation  -EL     Mode of Treatment individual therapy;physical therapy  -EL     Row Name 03/08/23 1023          General Information    Patient Profile Reviewed yes  -EL     Prior Level of Function independent:;all household mobility  -EL     Existing Precautions/Restrictions fall  -EL     Barriers to Rehab medically complex;previous functional deficit  -EL     Row Name 03/08/23 1023          Living Environment    People in Home alone;other (see comments)  children and grandchildren assist  -EL      Row Name 03/08/23 1023          Home Main Entrance    Number of Stairs, Main Entrance one  -EL     Stair Railings, Main Entrance none  -EL     Row Name 03/08/23 1023          Stairs Within Home, Primary    Number of Stairs, Within Home, Primary none  -EL     Row Name 03/08/23 1023          Cognition    Orientation Status (Cognition) oriented x 3  -EL     Row Name 03/08/23 1023          Safety Issues, Functional Mobility    Impairments Affecting Function (Mobility) endurance/activity tolerance;strength;balance;pain  -EL           User Key  (r) = Recorded By, (t) = Taken By, (c) = Cosigned By    Initials Name Provider Type    Teresa Bhatia PT Physical Therapist               Mobility     Row Name 03/08/23 1024          Bed Mobility    Bed Mobility supine-sit;sit-supine  -EL     Supine-Sit [place] (Bed Mobility) minimum assist (75% patient effort)  -EL     Sit-Supine [place] (Bed Mobility) minimum assist (75% patient effort)  -EL     Assistive Device (Bed Mobility) bed rails;head of bed elevated  -EL     Row Name 03/08/23 1024          Bed-Chair Transfer    Bed-Chair [place] (Transfers) not tested  -EL     Row Name 03/08/23 1024          Sit-Stand Transfer    Sit-Stand [place] (Transfers) contact guard  -EL     Assistive Device (Sit-Stand Transfers) walker, front-wheeled  -EL     Row Name 03/08/23 1024          Gait/Stairs (Locomotion)    [place] Level (Gait) contact guard  -EL     Assistive Device (Gait) walker, front-wheeled  -EL     Distance in Feet (Gait) 4  -EL     Deviations/Abnormal Patterns (Gait) bilateral deviations;antalgic;stride length decreased;gait speed decreased;paul decreased;weight shifting decreased  -EL     Bilateral Gait Deviations forward flexed posture  -EL           User Key  (r) = Recorded By, (t) = Taken By, (c) = Cosigned By    Initials Name Provider Type    Teresa Bhatia PT Physical Therapist               Obj/Interventions     Row Name 03/08/23  1025          Range of Motion Comprehensive    General Range of Motion bilateral lower extremity ROM WNL  -EL     Row Name 03/08/23 1025          Strength Comprehensive (MMT)    General Manual Muscle Testing (MMT) Assessment lower extremity strength deficits identified  -EL     Comment, General Manual Muscle Testing (MMT) Assessment BLEs 3+/5  -EL     Row Name 03/08/23 1025          Motor Skills    Motor Skills functional endurance  -EL     Functional Endurance poor  -EL     Row Name 03/08/23 1025          Balance    Balance Assessment sitting static balance;sitting dynamic balance;standing static balance;standing dynamic balance  -EL     Static Sitting Balance supervision  -EL     Dynamic Sitting Balance supervision  -EL     Position, Sitting Balance sitting edge of bed  -EL     Static Standing Balance contact guard  -EL     Dynamic Standing Balance contact guard  -EL     Position/Device Used, Standing Balance walker, rolling  -EL           User Key  (r) = Recorded By, (t) = Taken By, (c) = Cosigned By    Initials Name Provider Type    Teresa Bhatia, PT Physical Therapist               Goals/Plan     Row Name 03/08/23 1029          Bed Mobility Goal 1 (PT)    Activity/Assistive Device (Bed Mobility Goal 1, PT) sit to supine/supine to sit  -EL     [place] Level/Cues Needed (Bed Mobility Goal 1, PT) supervision required  -EL     Time Frame (Bed Mobility Goal 1, PT) 1 week  -EL     Row Name 03/08/23 1029          Transfer Goal 1 (PT)    Activity/Assistive Device (Transfer Goal 1, PT) bed-to-chair/chair-to-bed  -EL     [place] Level/Cues Needed (Transfer Goal 1, PT) supervision required  -EL     Time Frame (Transfer Goal 1, PT) 1 week  -EL     Row Name 03/08/23 1029          Gait Training Goal 1 (PT)    Activity/Assistive Device (Gait Training Goal 1, PT) gait (walking locomotion);increase endurance/gait distance;improve balance and speed;decrease fall risk;walker, rolling  -EL     [place] Level  (Gait Training Goal 1, PT) contact guard required  -EL     Distance (Gait Training Goal 1, PT) 15  -EL     Time Frame (Gait Training Goal 1, PT) 1 week  -EL           User Key  (r) = Recorded By, (t) = Taken By, (c) = Cosigned By    Initials Name Provider Type    Teersa Bhatia, PT Physical Therapist               Clinical Impression     Row Name 03/08/23 1025          Pain    Pretreatment Pain Rating 7/10  -EL     Posttreatment Pain Rating 7/10  -EL     Pain Location - Side/Orientation Bilateral  -EL     Pain Location - back;hip;knee;ankle  -EL     Pain Intervention(s) Repositioned  -EL     Row Name 03/08/23 1025          Plan of Care Review    Plan of Care Reviewed With patient;family  -EL     Outcome Evaluation The patient is a 89 year old female admitted to the hospital with B LBP without sciatica. Prior to admission, the patient states that she was independent with ambulation in the home with a rolling walker but her daughter and granddaughter came to assist with other household tasks. Upon evaluation today, she requires min assist for bed mobility and is able to ambulate 4 ft with rw and CGA. Gait very slow and unsteady. She continues to require additional skilled PT to address  limitations in strength, balance, and endurance to return to her previous level of function. She would benefit from SNF at [place].  -     Row Name 03/08/23 1025          Therapy Assessment/Plan (PT)    Rehab Potential (PT) fair, will monitor progress closely  -EL     Criteria for Skilled Interventions Met (PT) yes;meets criteria;skilled treatment is necessary  -EL     Therapy Frequency (PT) 5 times/wk  -EL     Row Name 03/08/23 1025          Positioning and Restraints    Pre-Treatment Position in bed  -EL     Post Treatment Position bed  -EL     In Bed supine;exit alarm on;call light within reach;with family/caregiver  -EL           User Key  (r) = Recorded By, (t) = Taken By, (c) = Cosigned By    Initials Name Provider Type    LAURIE  Teresa Hoyos, PT Physical Therapist               Outcome Measures     Row Name 03/08/23 1030          How much help from another person do you currently need...    Turning from your back to your side while in flat bed without using bedrails? 3  -EL     Moving from lying on back to sitting on the side of a flat bed without bedrails? 2  -EL     Moving to and from a bed to a chair (including a wheelchair)? 2  -EL     Standing up from a chair using your arms (e.g., wheelchair, bedside chair)? 3  -EL     Climbing 3-5 steps with a railing? 1  -EL     To walk in hospital room? 2  -EL     AM-PAC 6 Clicks Score (PT) 13  -EL     Highest level of mobility 4 --> Transferred to chair/commode  -EL     Row Name 03/08/23 0938          Modified [place] Scale    Modified [place] Scale 4 - Moderately severe disability.  Unable to walk without assistance, and unable to attend to own bodily needs without assistance.  -KG     Row Name 03/08/23 1030 03/08/23 0938       Functional Assessment    Outcome Measure Options AM-PAC 6 Clicks Basic Mobility (PT)  -EL AM-PAC 6 Clicks Daily Activity (OT);Modified [place]  -KG          User Key  (r) = Recorded By, (t) = Taken By, (c) = Cosigned By    Initials Name Provider Type    eTresa Bhatia, PT Physical Therapist    KG John Garibay OT Occupational Therapist                             Physical Therapy Education     Title: PT OT SLP Therapies (In Progress)     Topic: Physical Therapy (In Progress)     Point: Mobility training (Done)     Learning Progress Summary           Patient Acceptance, E, VU by EL at 3/8/2023 1030                   Point: Home exercise program (Not Started)     Learner Progress:  Not documented in this visit.          Point: Body mechanics (Done)     Learning Progress Summary           Patient Acceptance, E, VU by EL at 3/8/2023 1030                   Point: Precautions (Not Started)     Learner Progress:  Not documented in this visit.                      User Key      Initials Effective Dates Name Provider Type Discipline    EL 11/16/21 -  Teresa Hoyos PT Physical Therapist PT              PT Recommendation and Plan     Plan of Care Reviewed With: patient, family  Outcome Evaluation: The patient is a 89 year old female admitted to the hospital with B LBP without sciatica. Prior to admission, the patient states that she was independent with ambulation in the home with a rolling walker but her daughter and granddaughter came to assist with other household tasks. Upon evaluation today, she requires min assist for bed mobility and is able to ambulate 4 ft with rw and CGA. Gait very slow and unsteady. She continues to require additional skilled PT to address  limitations in strength, balance, and endurance to return to her previous level of function. She would benefit from SNF at [place].     Time Calculation:    PT Charges     Row Name 03/08/23 1032             Time Calculation    Start Time 1001  -EL      Stop Time 1022  -EL      Time Calculation (min) 21 min  -EL      PT Received On 03/08/23  -EL      PT - Next Appointment 03/09/23  -EL      PT Goal Re-Cert Due Date 03/15/23  -EL         Time Calculation- PT    Total Timed Code Minutes- PT 17 minute(s)  -EL            User Key  (r) = Recorded By, (t) = Taken By, (c) = Cosigned By    Initials Name Provider Type    EL Teresa Hoyos PT Physical Therapist              Therapy Charges for Today     Code Description Service Date Service Provider Modifiers Qty    [id number]  PT EVAL HIGH COMPLEXITY 1 3/8/2023 Teresa Hoyos, PT GP 1    [id number]  PT THERAPEUTIC ACT EA 15 MIN 3/8/2023 Teresa Hoyos PT GP 1          PT G-Codes  Outcome Measure Options: AM-PAC 6 Clicks Basic Mobility (PT)  AM-PAC 6 Clicks Score (PT): 13  AM-PAC 6 Clicks Score (OT): 16  Modified [place] Scale: 4 - Moderately severe disability.  Unable to walk without assistance, and unable to attend to own bodily needs without assistance.  PT Discharge  Summary  Anticipated Discharge Disposition (PT): skilled nursing facility    Teresa Hoyos, PT  3/8/2023

## 2023-03-08 NOTE — CONSULTS
.     REASON FOR CONSULTATION:     Provide an opinion on any further workup or treatment  ITP                             REQUESTING PHYSICIAN: Dheeraj Lynch MD  RECORDS OBTAINED:  Records of the patient's history including those obtained from the referring provider were reviewed and summarized in detail.    HISTORY OF PRESENT ILLNESS:  The patient is a 89 y.o. year old female  who is here for follow-up with the above-mentioned history.  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  In 2019 her platelet counts were noted to be around 52,000 IPF on labs from 3/3/2022 was noted to be elevated at 12.2 indicative of a peripheral destructive process.  CT abdomen and pelvis from 3/8/2022 suggestive of suspicious chronic liver disease but the spleen size was normal at 10.4 cm.  She was initiated on steroids on 3/3/2022 with prednisone at the dose of 30 mg daily.  Platelet count improved to 90,000 and subsequently prednisone was tapered to 20 mg daily.  B12 is also noted to be borderline hence received B12 replacement.  Prednisone dose was subsequently decreased to 15 mg daily and platelets have ranged between 40-50,000.  She subsequently had COVID-19 infection in August 2022.  More recently she has been on prednisone 10 mg daily and platelet count has ranged between 40-50,000.  Patient has had chronic back issues and she presents to the hospital with complaints of worsening back pain and the pain going down her lower extremities and inability to stand or ambulate.   MRI of the spine suggestive of edema at the L5 vertebral body and neuroforaminal narrowing at L5-S1.  She has degenerative changes in the spine.  She was thought to be a poor surgical candidate due to ITP, osteoporosis and advanced age.  Anesthesiology consulted to determine the safe platelet count and they recommend a platelet count of greater than 100,000.  We have been consulted for further  management of the thrombocytopenia.       Past Medical History:   Diagnosis Date   • Aortic valve stenosis     s/p tissue AVR   • Back pain    • CKD (chronic kidney disease)    • Colitis due to Clostridioides difficile 01/26/2022   • Diastolic dysfunction     Grade 2 per echocardiogram 2013   • Diverticulosis    • Exertional shortness of breath    • Heart disease    • Hiatal hernia    • Hyperlipidemia    • Hypertension    • Hyperthyroidism    • Hypertriglyceridemia 05/31/2018   • Hypothyroidism    • L5 compression fracture (HCC) 08/2022   • Left ventricular hypertrophy    • Legally blind    • Liver disease    • Macular degeneration    • Mitral regurgitation    • Osteoarthritis of hip    • Osteoporosis    • Pancreatitis 01/26/2022   • Paroxysmal atrial fibrillation (HCC)    • Premature ventricular contractions    • Pulmonary hypertension (HCC)    • Renal insufficiency syndrome    • Type 2 diabetes mellitus (HCC)    • Uterine cancer (HCC)      Past Surgical History:   Procedure Laterality Date   • AORTIC VALVE REPAIR/REPLACEMENT     • CATARACT EXTRACTION      1970, 1999   • CHOLECYSTECTOMY     • ENDOSCOPY  08/15/2014    no gross lesions in stomach/duodenum, erythrematous mucosa in stomach   • HYSTERECTOMY  2007   • STERNOTOMY         MEDICATIONS    Current Facility-Administered Medications:   •  acetaminophen (TYLENOL) tablet 650 mg, 650 mg, Oral, Q4H PRN **OR** acetaminophen (TYLENOL) 160 MG/5ML solution 650 mg, 650 mg, Oral, Q4H PRN **OR** acetaminophen (TYLENOL) suppository 650 mg, 650 mg, Rectal, Q4H PRN, Dheeraj Lynch MD  •  amLODIPine (NORVASC) tablet 2.5 mg, 2.5 mg, Oral, Daily, Dheeraj Lynch MD, 2.5 mg at 03/07/23 2150  •  budesonide-formoterol (SYMBICORT) 160-4.5 MCG/ACT inhaler 2 puff, 2 puff, Inhalation, BID - RT, Dheeraj Lynch MD, 2 puff at 03/08/23 0649  •  carvedilol (COREG) tablet 12.5 mg, 12.5 mg, Oral, BID, Dheeraj Lynch MD, 12.5 mg at 03/08/23 1210  •  cetirizine (zyrTEC) tablet 10 mg, 10 mg, Oral,  Daily, Dheeraj Lynch MD, 10 mg at 03/07/23 2149  •  cetirizine (zyrTEC) tablet 5 mg, 5 mg, Oral, Daily PRN, Dheeraj Lynch MD  •  cholecalciferol (VITAMIN D3) tablet 1,000 Units, 1,000 Units, Oral, Daily, Dheeraj Lynch MD, 1,000 Units at 03/07/23 2156  •  dextrose (D50W) (25 g/50 mL) IV injection 25 g, 25 g, Intravenous, Q15 Min PRN, Dheeraj Lynch MD  •  dextrose (GLUTOSE) oral gel 15 g, 15 g, Oral, Q15 Min PRN, Dheeraj Lynch MD  •  furosemide (LASIX) tablet 40 mg, 40 mg, Oral, Daily, Dheeraj Lynch MD, 40 mg at 03/07/23 2149  •  gabapentin (NEURONTIN) capsule 600 mg, 600 mg, Oral, Q12H, Chanda Pineda, RIGO  •  glucagon (GLUCAGEN) injection 1 mg, 1 mg, Intramuscular, Q15 Min PRN, Dheeraj Lynch MD  •  hydrALAZINE (APRESOLINE) tablet 50 mg, 50 mg, Oral, BID, Dheeraj Lynch MD, 50 mg at 03/08/23 1210  •  HYDROcodone-acetaminophen (NORCO) 7.5-325 MG per tablet 1 tablet, 1 tablet, Oral, Q6H, Dheeraj Lynch MD, 1 tablet at 03/08/23 1226  •  HYDROmorphone (DILAUDID) injection 0.25 mg, 0.25 mg, Intravenous, Q2H PRN, Dheeraj Lynch MD, 0.25 mg at 03/08/23 1210  •  insulin glargine (LANTUS, SEMGLEE) injection 20 Units, 20 Units, Subcutaneous, Nightly, Dheeraj Lynch MD, 20 Units at 03/07/23 2206  •  insulin lispro (ADMELOG) injection 0-9 Units, 0-9 Units, Subcutaneous, TID AC, Dheeraj Lynch MD, 2 Units at 03/08/23 1227  •  levothyroxine (SYNTHROID, LEVOTHROID) tablet 50 mcg, 50 mcg, Oral, Q AM, Dheeraj Lynch MD, 50 mcg at 03/08/23 0628  •  lidocaine (LIDODERM) 5 % 1 patch, 1 patch, Transdermal, Q24H, Dheeraj Lynch MD, 1 patch at 03/07/23 2152  •  LORazepam (ATIVAN) tablet 0.5 mg, 0.5 mg, Oral, Q8H PRN, Dheeraj Lynch MD, 0.5 mg at 03/07/23 2206  •  montelukast (SINGULAIR) tablet 10 mg, 10 mg, Oral, Nightly, Dheeraj Lynch MD, 10 mg at 03/07/23 2149  •  nitroglycerin (NITROSTAT) SL tablet 0.4 mg, 0.4 mg, Sublingual, Q5 Min PRN, Dheeraj Lynch MD  •  ondansetron (ZOFRAN) injection 4 mg, 4 mg, Intravenous, Q6H PRN, Dheeraj Lynch,  MD  •  ondansetron (ZOFRAN) tablet 8 mg, 8 mg, Oral, PRN, Dheeraj Lynch MD  •  predniSONE (DELTASONE) tablet 10 mg, 10 mg, Oral, Daily With Breakfast, Dheeraj Lynch MD, 10 mg at 23 1210  •  [COMPLETED] Insert Peripheral IV, , , Once **AND** sodium chloride 0.9 % flush 10 mL, 10 mL, Intravenous, PRN, Dheeraj Lynch MD  •  sodium chloride 0.9 % flush 10 mL, 10 mL, Intravenous, Q12H, Dheeraj Lynch MD, 10 mL at 23 1145  •  sodium chloride 0.9 % flush 10 mL, 10 mL, Intravenous, PRN, Dheeraj Lynch MD  •  sodium chloride 0.9 % infusion 40 mL, 40 mL, Intravenous, PRN, Dheeraj Lynch MD  •  tamsulosin (FLOMAX) 24 hr capsule 0.4 mg, 0.4 mg, Oral, Daily, Dheeraj Lynch MD, 0.4 mg at 23    ALLERGIES:     Allergies   Allergen Reactions   • Erythromycin Unknown (See Comments) and Other (See Comments)     Pt states she does not remember but it was many years ago  Pt states she does not remember but it was many years ago   • Statins Myalgia   • Cephalexin Other (See Comments) and Unknown (See Comments)     2022 Pt has tolerated ceftriaxone and cefepime during admission.   Pt states she does not remember reaction but it was many years ago   • Penicillins Rash   • Sulfa Antibiotics Itching and Rash       SOCIAL HISTORY:       Social History     Socioeconomic History   • Marital status:    Tobacco Use   • Smoking status: Former     Packs/day: 0.50     Types: Cigarettes     Quit date: 7/10/1969     Years since quittin.6   • Smokeless tobacco: Never   Vaping Use   • Vaping Use: Never used   Substance and Sexual Activity   • Alcohol use: No     Comment: caffeine use - coffee 2 cups daily   • Drug use: No   • Sexual activity: Defer         FAMILY HISTORY:  Family History   Problem Relation Age of Onset   • Heart disease Mother    • Hypertension Mother    • Stroke Mother    • Diabetes Mother         mellitus   • Other Other         cardiovascular disorder       REVIEW OF SYSTEMS:  Review of  Systems  Review of systems as mentioned in the HPI otherwise         Vitals:    03/08/23 0649 03/08/23 0651 03/08/23 1210 03/08/23 1305   BP:  154/65 143/65 156/61   BP Location:  Right arm  Right arm   Patient Position:  Lying  Lying   Pulse: 61 60 64 57   Resp: 18 18  18   Temp:  97.8 °F (36.6 °C)  98 °F (36.7 °C)   TempSrc:  Oral  Oral   SpO2: 93% 97%  94%   Weight:       Height:         Current Status 9/14/2022   ECOG score 1      PHYSICAL EXAM:      CONSTITUTIONAL:  Vital signs reviewed.  No distress, looks comfortable.  Appears stated age  EYES:  Conjunctivae and lids unremarkable.  PERRLA  EARS,NOSE,MOUTH,THROAT:  Ears and nose appear unremarkable.  Lips, teeth, gums appear unremarkable.  RESPIRATORY:  Normal respiratory effort.  Lungs clear to auscultation bilaterally.  CARDIOVASCULAR:  Normal S1, S2.  No murmurs rubs or gallops.  No significant lower extremity edema.  GASTROINTESTINAL: Abdomen appears unremarkable.  Nontender.  No hepatomegaly.  No splenomegaly.  LYMPHATIC:  No cervical, supraclavicular, axillary lymphadenopathy.  PSYCHIATRIC:  Normal judgment and insight.  Normal mood and affect.      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  · Currently on prednisone 10 mg daily.  · Platelet count is around baseline at 40,000.  · Due to ongoing back pain an epidural injection is being considered.  · Anesthesiology would require platelet count to be greater than 100,000.     · I will obtain an IPF and if IPF remains elevated then we can treat the patient with IVIG for a rapid response of the platelet count.  · If no improvement in platelets then repeat a scan to evaluate the liver and spleen.    *Back pain and lower extremity pain  · Secondary to degenerative changes in the lumbar spine.  · Considering epidural injection.  · Require platelet count to be 100,000.    *Acute on chronic diastolic congestive heart failure, -management per primary    Recommendations  · Obtain IPF  · If IPF elevated then proceed with IVIG for improvement in platelets

## 2023-03-08 NOTE — PLAN OF CARE
Goal Outcome Evaluation:  Plan of Care Reviewed With: patient        Progress: no change  Outcome Evaluation: Pt admitted to [facility] for generalized weakness/fatigue. Reports she was unable to get up at home. Lives w/ adult granddaughter in a 1 story home w/ 1 step to enter. I/Mod I w/ ADLs and functional mobility at baseline. Requiring Min A for bed mobility. SBA for static sitting EOB. Max A for LBD. Performed sit>stand from EOB w/ Min A using RW. Maintained static stand for 1 minute w/ CGA using RW, flexed posture. Attempted to step to HOB, however was unable to perform 2/2 fatigue. Pt presents w/ diminished strength/activtiy tolerance and requiring increased assistance w/ ADLs. Rec SNF for subacute rehab at d/c. OT will continue to follow.

## 2023-03-09 ENCOUNTER — APPOINTMENT (OUTPATIENT)
Dept: CT IMAGING | Facility: HOSPITAL | Age: 88
DRG: 551 | End: 2023-03-09
Payer: MEDICARE

## 2023-03-09 LAB
ALBUMIN SERPL-MCNC: 3.5 G/DL (ref 3.5–5.2)
ALBUMIN/GLOB SERPL: 1.5 G/DL
ALP SERPL-CCNC: 52 U/L (ref 39–117)
ALT SERPL W P-5'-P-CCNC: 13 U/L (ref 1–33)
ANION GAP SERPL CALCULATED.3IONS-SCNC: 10.3 MMOL/L (ref 5–15)
AST SERPL-CCNC: 12 U/L (ref 1–32)
BASOPHILS # BLD AUTO: 0.01 10*3/MM3 (ref 0–0.2)
BASOPHILS NFR BLD AUTO: 0.2 % (ref 0–1.5)
BILIRUB SERPL-MCNC: 0.5 MG/DL (ref 0–1.2)
BUN SERPL-MCNC: 42 MG/DL (ref 8–23)
BUN/CREAT SERPL: 24.3 (ref 7–25)
CALCIUM SPEC-SCNC: 8 MG/DL (ref 8.2–9.6)
CHLORIDE SERPL-SCNC: 102 MMOL/L (ref 98–107)
CO2 SERPL-SCNC: 23.7 MMOL/L (ref 22–29)
CREAT SERPL-MCNC: 1.73 MG/DL (ref 0.57–1)
DEPRECATED RDW RBC AUTO: 47.3 FL (ref 37–54)
EGFRCR SERPLBLD CKD-EPI 2021: 27.4 ML/MIN/1.73
EOSINOPHIL # BLD AUTO: 0.04 10*3/MM3 (ref 0–0.4)
EOSINOPHIL NFR BLD AUTO: 0.6 % (ref 0.3–6.2)
ERYTHROCYTE [DISTWIDTH] IN BLOOD BY AUTOMATED COUNT: 14.7 % (ref 12.3–15.4)
GLOBULIN UR ELPH-MCNC: 2.4 GM/DL
GLUCOSE BLDC GLUCOMTR-MCNC: 145 MG/DL (ref 70–130)
GLUCOSE BLDC GLUCOMTR-MCNC: 179 MG/DL (ref 70–130)
GLUCOSE BLDC GLUCOMTR-MCNC: 357 MG/DL (ref 70–130)
GLUCOSE BLDC GLUCOMTR-MCNC: 381 MG/DL (ref 70–130)
GLUCOSE SERPL-MCNC: 220 MG/DL (ref 65–99)
HCT VFR BLD AUTO: 38.3 % (ref 34–46.6)
HGB BLD-MCNC: 12.4 G/DL (ref 12–15.9)
LYMPHOCYTES # BLD AUTO: 1.63 10*3/MM3 (ref 0.7–3.1)
LYMPHOCYTES NFR BLD AUTO: 25.3 % (ref 19.6–45.3)
MCH RBC QN AUTO: 28.3 PG (ref 26.6–33)
MCHC RBC AUTO-ENTMCNC: 32.4 G/DL (ref 31.5–35.7)
MCV RBC AUTO: 87.4 FL (ref 79–97)
MONOCYTES # BLD AUTO: 0.54 10*3/MM3 (ref 0.1–0.9)
MONOCYTES NFR BLD AUTO: 8.4 % (ref 5–12)
NEUTROPHILS NFR BLD AUTO: 4.2 10*3/MM3 (ref 1.7–7)
NEUTROPHILS NFR BLD AUTO: 65.2 % (ref 42.7–76)
PLATELET # BLD AUTO: 41 10*3/MM3 (ref 140–450)
PMV BLD AUTO: 11.4 FL (ref 6–12)
POTASSIUM SERPL-SCNC: 4.3 MMOL/L (ref 3.5–5.2)
PROT SERPL-MCNC: 5.9 G/DL (ref 6–8.5)
RBC # BLD AUTO: 4.38 10*6/MM3 (ref 3.77–5.28)
SODIUM SERPL-SCNC: 136 MMOL/L (ref 136–145)
WBC NRBC COR # BLD: 6.44 10*3/MM3 (ref 3.4–10.8)

## 2023-03-09 PROCEDURE — 30233S1 TRANSFUSION OF NONAUTOLOGOUS GLOBULIN INTO PERIPHERAL VEIN, PERCUTANEOUS APPROACH: ICD-10-PCS | Performed by: INTERNAL MEDICINE

## 2023-03-09 PROCEDURE — 25010000002 HYDROMORPHONE PER 4 MG: Performed by: INTERNAL MEDICINE

## 2023-03-09 PROCEDURE — 82962 GLUCOSE BLOOD TEST: CPT

## 2023-03-09 PROCEDURE — 85025 COMPLETE CBC W/AUTO DIFF WBC: CPT | Performed by: HOSPITALIST

## 2023-03-09 PROCEDURE — 25010000002 ONDANSETRON PER 1 MG: Performed by: INTERNAL MEDICINE

## 2023-03-09 PROCEDURE — 94799 UNLISTED PULMONARY SVC/PX: CPT

## 2023-03-09 PROCEDURE — 63710000001 INSULIN LISPRO (HUMAN) PER 5 UNITS: Performed by: INTERNAL MEDICINE

## 2023-03-09 PROCEDURE — 80053 COMPREHEN METABOLIC PANEL: CPT | Performed by: HOSPITALIST

## 2023-03-09 PROCEDURE — 74176 CT ABD & PELVIS W/O CONTRAST: CPT

## 2023-03-09 PROCEDURE — 63710000001 PREDNISONE PER 5 MG: Performed by: INTERNAL MEDICINE

## 2023-03-09 PROCEDURE — 25010000002 IMMUNE GLOBULIN (HUMAN) 20 GM/200ML SOLUTION: Performed by: INTERNAL MEDICINE

## 2023-03-09 PROCEDURE — 99232 SBSQ HOSP IP/OBS MODERATE 35: CPT | Performed by: INTERNAL MEDICINE

## 2023-03-09 PROCEDURE — 99232 SBSQ HOSP IP/OBS MODERATE 35: CPT | Performed by: NURSE PRACTITIONER

## 2023-03-09 RX ORDER — BISACODYL 10 MG
10 SUPPOSITORY, RECTAL RECTAL DAILY PRN
Status: DISCONTINUED | OUTPATIENT
Start: 2023-03-09 | End: 2023-03-14 | Stop reason: HOSPADM

## 2023-03-09 RX ORDER — AMOXICILLIN 250 MG
2 CAPSULE ORAL NIGHTLY
Status: DISCONTINUED | OUTPATIENT
Start: 2023-03-09 | End: 2023-03-14 | Stop reason: HOSPADM

## 2023-03-09 RX ADMIN — TAMSULOSIN HYDROCHLORIDE 0.4 MG: 0.4 CAPSULE ORAL at 11:22

## 2023-03-09 RX ADMIN — INSULIN LISPRO 8 UNITS: 100 INJECTION, SOLUTION INTRAVENOUS; SUBCUTANEOUS at 17:40

## 2023-03-09 RX ADMIN — HYDRALAZINE HYDROCHLORIDE 50 MG: 50 TABLET ORAL at 20:17

## 2023-03-09 RX ADMIN — INSULIN GLARGINE-YFGN 20 UNITS: 100 INJECTION, SOLUTION SUBCUTANEOUS at 20:17

## 2023-03-09 RX ADMIN — HYDRALAZINE HYDROCHLORIDE 50 MG: 50 TABLET ORAL at 11:20

## 2023-03-09 RX ADMIN — Medication 10 ML: at 20:18

## 2023-03-09 RX ADMIN — CETIRIZINE HYDROCHLORIDE 10 MG: 10 TABLET ORAL at 11:21

## 2023-03-09 RX ADMIN — HYDROCODONE BITARTRATE AND ACETAMINOPHEN 1 TABLET: 7.5; 325 TABLET ORAL at 20:17

## 2023-03-09 RX ADMIN — GABAPENTIN 600 MG: 300 CAPSULE ORAL at 20:17

## 2023-03-09 RX ADMIN — MONTELUKAST SODIUM 10 MG: 10 TABLET, FILM COATED ORAL at 20:17

## 2023-03-09 RX ADMIN — HYDROCODONE BITARTRATE AND ACETAMINOPHEN 1 TABLET: 7.5; 325 TABLET ORAL at 15:02

## 2023-03-09 RX ADMIN — CARVEDILOL 12.5 MG: 12.5 TABLET, FILM COATED ORAL at 20:17

## 2023-03-09 RX ADMIN — HYDROMORPHONE HYDROCHLORIDE 0.25 MG: 1 INJECTION, SOLUTION INTRAMUSCULAR; INTRAVENOUS; SUBCUTANEOUS at 15:02

## 2023-03-09 RX ADMIN — HYDROMORPHONE HYDROCHLORIDE 0.25 MG: 1 INJECTION, SOLUTION INTRAMUSCULAR; INTRAVENOUS; SUBCUTANEOUS at 11:40

## 2023-03-09 RX ADMIN — ONDANSETRON 4 MG: 2 INJECTION INTRAMUSCULAR; INTRAVENOUS at 11:40

## 2023-03-09 RX ADMIN — BUDESONIDE AND FORMOTEROL FUMARATE DIHYDRATE 2 PUFF: 160; 4.5 AEROSOL RESPIRATORY (INHALATION) at 20:24

## 2023-03-09 RX ADMIN — DOCUSATE SODIUM 50MG AND SENNOSIDES 8.6MG 2 TABLET: 8.6; 5 TABLET, FILM COATED ORAL at 20:17

## 2023-03-09 RX ADMIN — FUROSEMIDE 40 MG: 40 TABLET ORAL at 11:20

## 2023-03-09 RX ADMIN — IMMUNE GLOBULIN (HUMAN) 20 G: 10 INJECTION INTRAVENOUS; SUBCUTANEOUS at 11:23

## 2023-03-09 RX ADMIN — Medication 10 ML: at 10:24

## 2023-03-09 RX ADMIN — GABAPENTIN 600 MG: 300 CAPSULE ORAL at 11:19

## 2023-03-09 RX ADMIN — PREDNISONE 10 MG: 10 TABLET ORAL at 11:47

## 2023-03-09 RX ADMIN — Medication 1000 UNITS: at 11:21

## 2023-03-09 RX ADMIN — CARVEDILOL 12.5 MG: 12.5 TABLET, FILM COATED ORAL at 11:47

## 2023-03-09 RX ADMIN — AMLODIPINE BESYLATE 2.5 MG: 2.5 TABLET ORAL at 11:20

## 2023-03-09 NOTE — PLAN OF CARE
Goal Outcome Evaluation:      Patient may go for Epidural injection if able to get PLT's greater than 100,000. Has neuro, and hematology evaluating. May need IVIG. Continue to monitor.

## 2023-03-09 NOTE — PROGRESS NOTES
"DAILY PROGRESS NOTE  [facility]    Patient Identification:  Name: Helena Carmen  Age: 89 y.o.  Sex: female  :  1931  MRN: [id number]         Primary Care Physician: Mariah Hickman MD    Subjective:  Interval History:She she complains of pain.    Objective:    Scheduled Meds:amLODIPine, 2.5 mg, Oral, Daily  budesonide-formoterol, 2 puff, Inhalation, BID - RT  carvedilol, 12.5 mg, Oral, BID  cetirizine, 10 mg, Oral, Daily  cholecalciferol, 1,000 Units, Oral, Daily  furosemide, 40 mg, Oral, Daily  gabapentin, 600 mg, Oral, Q12H  hydrALAZINE, 50 mg, Oral, BID  HYDROcodone-acetaminophen, 1 tablet, Oral, Q6H  immune globulin (human), 20 g, Intravenous, Q24H   Followed by  [START ON 3/11/2023] immune globulin (human), 20 g, Intravenous, Q24H   Followed by  [START ON 3/13/2023] immune globulin (human), 20 g, Intravenous, Q24H   Followed by  [START ON 3/15/2023] immune globulin (human), 10 g, Intravenous, Q24H   Followed by  [START ON 3/17/2023] immune globulin (human), 5 g, Intravenous, Q24H  insulin glargine, 20 Units, Subcutaneous, Nightly  insulin lispro, 0-9 Units, Subcutaneous, TID AC  levothyroxine, 50 mcg, Oral, Q AM  lidocaine, 1 patch, Transdermal, Q24H  montelukast, 10 mg, Oral, Nightly  predniSONE, 10 mg, Oral, Daily With Breakfast  sodium chloride, 10 mL, Intravenous, Q12H  tamsulosin, 0.4 mg, Oral, Daily      Continuous Infusions:     Vital signs in last 24 hours:  Temp:  [97.5 °F (36.4 °C)-97.9 °F (36.6 °C)] 97.6 °F (36.4 °C)  Heart Rate:  [55-70] 58  Resp:  [16-18] 16  BP: (142-172)/(49-69) 164/61    Intake/Output:    Intake/Output Summary (Last 24 hours) at 3/9/2023 1418  Last data filed at 3/9/2023 0628  Gross per 24 hour   Intake --   Output 700 ml   Net -700 ml       Exam:  /61   Pulse 58   Temp 97.6 °F (36.4 °C)   Resp 16   Ht 144.8 cm (57.01\")   Wt 75.1 kg (165 lb 9.1 oz)   SpO2 98%   BMI 35.82 kg/m²     General Appearance:    Alert, cooperative, no distress "   Head:    Normocephalic, without obvious abnormality, atraumatic   Eyes:       Throat:   Lips, tongue, gums normal   Neck:   Supple, symmetrical, trachea midline, no JVD   Lungs:     Clear to auscultation bilaterally, respirations unlabored   Chest Wall:    No tenderness or deformity    Heart:    Regular rate and rhythm, S1 and S2 normal, no murmur,no  Rub or gallop   Abdomen:     Soft, nontender, bowel sounds active, no masses, no organomegaly    Extremities:   Extremities normal, atraumatic, no cyanosis or edema   Pulses:      Skin:   Skin is warm and dry,  no rashes or palpable lesions   Neurologic:   Very weak      Lab Results (last 72 hours)     Procedure Component Value Units Date/Time    POC Glucose Once [[id number]]  (Abnormal) Collected: 03/08/23 1108    Specimen: Blood Updated: 03/08/23 1111     Glucose 167 mg/dL      Comment: Meter: [id number] : [id number] Panchowianais Antonioia ELIZABETH       CBC Auto Differential [[id number]]  (Abnormal) Collected: 03/08/23 0621    Specimen: Blood Updated: 03/08/23 0722     WBC 6.02 10*3/mm3      RBC 4.50 10*6/mm3      Hemoglobin 12.4 g/dL      Hematocrit 39.0 %      MCV 86.7 fL      MCH 27.6 pg      MCHC 31.8 g/dL      RDW 15.0 %      RDW-SD 47.1 fl      MPV 12.5 fL      Platelets 40 10*3/mm3      Neutrophil % 64.2 %      Lymphocyte % 24.4 %      Monocyte % 9.8 %      Eosinophil % 0.8 %      Basophil % 0.3 %      Neutrophils, Absolute 3.86 10*3/mm3      Lymphocytes, Absolute 1.47 10*3/mm3      Monocytes, Absolute 0.59 10*3/mm3      Eosinophils, Absolute 0.05 10*3/mm3      Basophils, Absolute 0.02 10*3/mm3     Comprehensive Metabolic Panel [[id number]]  (Abnormal) Collected: 03/08/23 0621    Specimen: Blood Updated: 03/08/23 0654     Glucose 191 mg/dL      BUN 34 mg/dL      Creatinine 1.54 mg/dL      Sodium 139 mmol/L      Potassium 4.4 mmol/L      Chloride 104 mmol/L      CO2 24.0 mmol/L      Calcium 8.1 mg/dL      Total Protein 6.0 g/dL      Albumin 3.6 g/dL      ALT (SGPT) 14 U/L       AST (SGOT) 9 U/L      Alkaline Phosphatase 51 U/L      Total Bilirubin 0.6 mg/dL      Globulin 2.4 gm/dL      A/G Ratio 1.5 g/dL      BUN/Creatinine Ratio 22.1     Anion Gap 11.0 mmol/L      eGFR 31.5 mL/min/1.73     Narrative:      GFR Normal >60  Chronic Kidney Disease <60  Kidney Failure <15    The GFR formula is only valid for adults with stable renal function between ages 18 and 70.    Hemoglobin A1c [[id number]]  (Abnormal) Collected: 03/08/23 0621    Specimen: Blood Updated: 03/08/23 0648     Hemoglobin A1C 6.20 %     Narrative:      Hemoglobin A1C Ranges:    Increased Risk for Diabetes  5.7% to 6.4%  Diabetes                     >= 6.5%  Diabetic Goal                < 7.0%    POC Glucose Once [[id number]]  (Abnormal) Collected: 03/08/23 0641    Specimen: Blood Updated: 03/08/23 0643     Glucose 176 mg/dL      Comment: Meter: [id number] : [id number] Mark Arrington       POC Glucose Once [[id number]]  (Abnormal) Collected: 03/07/23 2140    Specimen: Blood Updated: 03/07/23 2146     Glucose 180 mg/dL      Comment: Meter: [id number] : [id number] Jonathan JOHNSON       High Sensitivity Troponin T 2Hr [[id number]]  (Abnormal) Collected: 03/07/23 1441    Specimen: Blood Updated: 03/07/23 1520     HS Troponin T 67 ng/L      Troponin T Delta -10 ng/L     Narrative:      High Sensitive Troponin T Reference Range:  <10.0 ng/L- Negative Female for AMI  <15.0 ng/L- Negative Male for AMI  >=10 - Abnormal Female indicating possible myocardial injury.  >=15 - Abnormal Male indicating possible myocardial injury.   Clinicians would have to utilize clinical acumen, EKG, Troponin, and serial changes to determine if it is an Acute Myocardial Infarction or myocardial injury due to an underlying chronic condition.         High Sensitivity Troponin T [[id number]]  (Abnormal) Collected: 03/07/23 1153    Specimen: Blood Updated: 03/07/23 1324     HS Troponin T 77 ng/L     Narrative:      High Sensitive Troponin T Reference  Range:  <10.0 ng/L- Negative Female for AMI  <15.0 ng/L- Negative Male for AMI  >=10 - Abnormal Female indicating possible myocardial injury.  >=15 - Abnormal Male indicating possible myocardial injury.   Clinicians would have to utilize clinical acumen, EKG, Troponin, and serial changes to determine if it is an Acute Myocardial Infarction or myocardial injury due to an underlying chronic condition.         Comprehensive Metabolic Panel [[id number]]  (Abnormal) Collected: 03/07/23 1153    Specimen: Blood Updated: 03/07/23 1312     Glucose 79 mg/dL      BUN 31 mg/dL      Creatinine 1.50 mg/dL      Sodium 137 mmol/L      Potassium 4.4 mmol/L      Chloride 102 mmol/L      CO2 25.1 mmol/L      Calcium 8.8 mg/dL      Total Protein 6.5 g/dL      Albumin 4.1 g/dL      ALT (SGPT) 19 U/L      AST (SGOT) 15 U/L      Alkaline Phosphatase 58 U/L      Total Bilirubin 0.5 mg/dL      Globulin 2.4 gm/dL      A/G Ratio 1.7 g/dL      BUN/Creatinine Ratio 20.7     Anion Gap 9.9 mmol/L      eGFR 32.6 mL/min/1.73     Narrative:      GFR Normal >60  Chronic Kidney Disease <60  Kidney Failure <15    The GFR formula is only valid for adults with stable renal function between ages 18 and 70.    Lipase [[id number]]  (Normal) Collected: 03/07/23 1153    Specimen: Blood Updated: 03/07/23 1312     Lipase 37 U/L     BNP [[id number]]  (Abnormal) Collected: 03/07/23 1153    Specimen: Blood Updated: 03/07/23 1310     proBNP 2,394.0 pg/mL     Narrative:      Among patients with dyspnea, NT-proBNP is highly sensitive for the detection of acute congestive heart failure. In addition NT-proBNP of <300 pg/ml effectively rules out acute congestive heart failure with 99% negative predictive value.    Results may be falsely decreased if patient taking Biotin.      Respiratory Panel PCR w/COVID-19(SARS-CoV-2) LATONIA/CHARLIE/ALEJANDRO/PAD/COR/MAD/PASQUALE In-House, NP Swab in UTM/VTM, 3-4 HR TAT - Swab, Nasopharynx [[id number]]  (Normal) Collected: 03/07/23 1142    Specimen: Swab  from Nasopharynx Updated: 03/07/23 1251     ADENOVIRUS, PCR Not Detected     Coronavirus 229E Not Detected     Coronavirus HKU1 Not Detected     Coronavirus NL63 Not Detected     Coronavirus OC43 Not Detected     COVID19 Not Detected     Human Metapneumovirus Not Detected     Human Rhinovirus/Enterovirus Not Detected     Influenza A PCR Not Detected     Influenza B PCR Not Detected     Parainfluenza Virus 1 Not Detected     Parainfluenza Virus 2 Not Detected     Parainfluenza Virus 3 Not Detected     Parainfluenza Virus 4 Not Detected     RSV, PCR Not Detected     Bordetella pertussis pcr Not Detected     Bordetella parapertussis PCR Not Detected     Chlamydophila pneumoniae PCR Not Detected     Mycoplasma pneumo by PCR Not Detected    Narrative:      In the setting of a positive respiratory panel with a viral infection PLUS a negative procalcitonin without other underlying concern for bacterial infection, consider observing off antibiotics or discontinuation of antibiotics and continue supportive care. If the respiratory panel is positive for atypical bacterial infection (Bordetella pertussis, Chlamydophila pneumoniae, or Mycoplasma pneumoniae), consider antibiotic de-escalation to target atypical bacterial infection.    Manual Differential [[id number]]  (Abnormal) Collected: 03/07/23 1153    Specimen: Blood Updated: 03/07/23 1239     Neutrophil % 85.6 %      Lymphocyte % 9.3 %      Monocyte % 5.2 %      Neutrophils Absolute 7.86 10*3/mm3      Lymphocytes Absolute 0.85 10*3/mm3      Monocytes Absolute 0.48 10*3/mm3      RBC Morphology Normal     Smudge Cells Slight/1+     Platelet Morphology Normal    CBC & Differential [[id number]]  (Abnormal) Collected: 03/07/23 1153    Specimen: Blood Updated: 03/07/23 1239    Narrative:      The following orders were created for panel order CBC & Differential.  Procedure                               Abnormality         Status                     ---------                                -----------         ------                     CBC Auto Differential[[id number]]        Abnormal            Final result                 Please view results for these tests on the individual orders.    CBC Auto Differential [[id number]]  (Abnormal) Collected: 03/07/23 1153    Specimen: Blood Updated: 03/07/23 1239     WBC 9.18 10*3/mm3      RBC 4.59 10*6/mm3      Hemoglobin 13.2 g/dL      Hematocrit 40.8 %      MCV 88.9 fL      MCH 28.8 pg      MCHC 32.4 g/dL      RDW 15.3 %      RDW-SD 49.6 fl      MPV 12.5 fL      Platelets 41 10*3/mm3     Urinalysis With Culture If Indicated - Straight Cath [[id number]]  (Normal) Collected: 03/07/23 1138    Specimen: Urine from Straight Cath Updated: 03/07/23 1228     Color, UA Yellow     Appearance, UA Clear     pH, UA 6.5     Specific Gravity, UA 1.007     Glucose, UA Negative     Ketones, UA Negative     Bilirubin, UA Negative     Blood, UA Negative     Protein, UA Negative     Leuk Esterase, UA Negative     Nitrite, UA Negative     Urobilinogen, UA 1.0 E.U./dL    Narrative:      In absence of clinical symptoms, the presence of pyuria, bacteria, and/or nitrites on the urinalysis result does not correlate with infection.  Urine microscopic not indicated.    POC Glucose Once [[id number]]  (Normal) Collected: 03/07/23 1140    Specimen: Blood Updated: 03/07/23 1142     Glucose 84 mg/dL      Comment: Meter: [id number] : [id number] Chad JOHNSON           Data Review:  Results from last 7 days   Lab Units 03/09/23  0525 03/08/23  0621 03/07/23  1153   SODIUM mmol/L 136 139 137   POTASSIUM mmol/L 4.3 4.4 4.4   CHLORIDE mmol/L 102 104 102   CO2 mmol/L 23.7 24.0 25.1   BUN mg/dL 42* 34* 31*   CREATININE mg/dL 1.73* 1.54* 1.50*   GLUCOSE mg/dL 220* 191* 79   CALCIUM mg/dL 8.0* 8.1* 8.8     Results from last 7 days   Lab Units 03/09/23  0525 03/08/23  1719 03/08/23  0621 03/07/23  1153   WBC 10*3/mm3 6.44  --  6.02 9.18   HEMOGLOBIN g/dL 12.4  --  12.4 13.2   HEMATOCRIT %  38.3  --  39.0 40.8   PLATELETS 10*3/mm3 41* 38* 40* 41*         Results from last 7 days   Lab Units 03/08/23  0621   HEMOGLOBIN A1C % 6.20*     Lab Results   Lab Value Date/Time    TROPONINT 67 (C) 03/07/2023 1441    TROPONINT 77 (C) 03/07/2023 1153    TROPONINT 0.052 (C) 12/20/2022 2113    TROPONINT 0.051 (C) 12/20/2022 1350    TROPONINT 0.010 08/07/2022 1046    TROPONINT <0.010 08/01/2022 1017    TROPONINT 0.017 07/31/2022 0920    TROPONINT 0.037 (C) 07/30/2022 1034    TROPONINT 0.034 (C) 03/04/2022 0645    TROPONINT 0.024 03/02/2022 1632         Results from last 7 days   Lab Units 03/09/23  0525 03/08/23  0621 03/07/23  1153   ALK PHOS U/L 52 51 58   BILIRUBIN mg/dL 0.5 0.6 0.5   ALT (SGPT) U/L 13 14 19   AST (SGOT) U/L 12 9 15         Results from last 7 days   Lab Units 03/08/23  0621   HEMOGLOBIN A1C % 6.20*     Glucose   Date/Time Value Ref Range Status   03/09/2023 1104 145 (H) 70 - 130 mg/dL Final     Comment:     Meter: [id number] : [id number] Jarrod Montilla NA   03/09/2023 0645 179 (H) 70 - 130 mg/dL Final     Comment:     Meter: [id number] : [id number] Ana Guzman RN   03/08/2023 2047 292 (H) 70 - 130 mg/dL Final     Comment:     Meter: [id number] : [id number] Mark Arrington   03/08/2023 1554 191 (H) 70 - 130 mg/dL Final     Comment:     Meter: [id number] : [id number] Quynh Leola NA   03/08/2023 1108 167 (H) 70 - 130 mg/dL Final     Comment:     Meter: [id number] : [id number] Quynh Leola NA   03/08/2023 0641 176 (H) 70 - 130 mg/dL Final     Comment:     Meter: [id number] : [id number] Mark Arrington   03/07/2023 2140 180 (H) 70 - 130 mg/dL Final     Comment:     Meter: [id number] : [id number] Jonathan JOHNSON   03/07/2023 1140 84 70 - 130 mg/dL Final     Comment:     Meter: [id number] : [id number] Chad JOHNSON           Past Medical History:   Diagnosis Date   • Aortic valve stenosis     s/p tissue AVR   • Back pain    • CKD (chronic kidney disease)    •  Colitis due to Clostridioides difficile 01/26/2022   • Diastolic dysfunction     Grade 2 per echocardiogram 2013   • Diverticulosis    • Exertional shortness of breath    • Heart disease    • Hiatal hernia    • Hyperlipidemia    • Hypertension    • Hyperthyroidism    • Hypertriglyceridemia 05/31/2018   • Hypothyroidism    • L5 compression fracture (HCC) 08/2022   • Left ventricular hypertrophy    • Legally blind    • Liver disease    • Macular degeneration    • Mitral regurgitation    • Osteoarthritis of hip    • Osteoporosis    • Pancreatitis 01/26/2022   • Paroxysmal atrial fibrillation (HCC)    • Premature ventricular contractions    • Pulmonary hypertension (HCC)    • Renal insufficiency syndrome    • Type 2 diabetes mellitus (HCC)    • Uterine cancer (HCC)        Assessment:  Active Hospital Problems    Diagnosis  POA   • **Bilateral low back pain without sciatica, unspecified chronicity [M54.50]  Yes   • Acute left-sided low back pain with left-sided sciatica [M54.42]  Unknown   • Chronic ITP (idiopathic thrombocytopenia) (HCC) [D69.3]  Yes   • Closed fracture of fifth lumbar vertebra (HCC) [S32.059A]  Yes   • Hypothyroidism [E03.9]  Yes   • Chronic pain disorder [G89.4]  Yes   • Macular degeneration [H35.30]  Yes   • Type 2 diabetes mellitus with hyperglycemia (HCC) [E11.65]  Yes   • Paroxysmal atrial fibrillation (HCC) [I48.0]  Yes   • Essential hypertension [I10]  Yes   • Legally blind [H54.8]  Yes   • Stage 4 chronic kidney disease (HCC) [N18.4]  Yes      Resolved Hospital Problems   No resolved problems to display.       Plan:  Neurosurgery and hematology consults noted.   Follow lab.  Pain control.??  IVIG. CT abdomen and RX for constipation.    Juan Lennon MD  3/9/2023  14:18 EST

## 2023-03-09 NOTE — PROGRESS NOTES
[facility]OS[facility] PROGRESS NOTE      CC: Back and bilateral leg pain L > R      Subjective     Interval History: Reports back pain still present but manageable with meds.  Hoping for epidural today.  IPF high so receiving IVIG.    ROS:  Constitutional: No fever, chills  MS: back pain  Neuro: Leg pain  : No difficulty voiding, no incontinence    Objective     Vital signs in last 24 hours:  Temp:  [97.5 °F (36.4 °C)-98 °F (36.7 °C)] 97.8 °F (36.6 °C)  Heart Rate:  [55-70] 58  Resp:  [16-18] 16  BP: (143-172)/(49-66) 172/65    Intake/Output this shift:  No intake/output data recorded.    LABS:  Results from last 7 days   Lab Units 03/09/23  0525 03/08/23  1719 03/08/23  0621 03/07/23  1153   WBC 10*3/mm3 6.44  --  6.02 9.18   HEMOGLOBIN g/dL 12.4  --  12.4 13.2   HEMATOCRIT % 38.3  --  39.0 40.8   PLATELETS 10*3/mm3 41* 38* 40* 41*     Results from last 7 days   Lab Units 03/09/23  0525 03/08/23  0621 03/07/23  1153   SODIUM mmol/L 136 139 137   POTASSIUM mmol/L 4.3 4.4 4.4   CHLORIDE mmol/L 102 104 102   CO2 mmol/L 23.7 24.0 25.1   BUN mg/dL 42* 34* 31*   CREATININE mg/dL 1.73* 1.54* 1.50*   CALCIUM mg/dL 8.0* 8.1* 8.8   BILIRUBIN mg/dL 0.5 0.6 0.5   ALK PHOS U/L 52 51 58   ALT (SGPT) U/L 13 14 19   AST (SGOT) U/L 12 9 15   GLUCOSE mg/dL 220* 191* 79     IPF-ELEVATED    IMAGING STUDIES:  No new imaging    I personally viewed and interpreted the patient's chart    Meds reviewed/changed: Yes  Gabapentin 600 mg twice daily  Norco 7.5 mg p.o. every 6 hours-pt refused several doses  Lidoderm patch every 24 hours  Prednisone 10 mg daily    Physical Exam:    General:   Awake, alert.  Lying in bed.  No acute distress.  Back:    Left straight leg raise causes low back and lateral leg pain  Motor: Normal strength in legs.    Station and Gait:             Not tested.  Extremities:   Wearing SCD      Assessment & Plan     ASSESSMENT:      Bilateral low back pain without sciatica, unspecified chronicity     Legally blind    Essential hypertension    Paroxysmal atrial fibrillation (HCC)    Stage 4 chronic kidney disease (HCC)    Chronic pain disorder    Type 2 diabetes mellitus with hyperglycemia ([facility])    Macular degeneration    Hypothyroidism    Closed fracture of fifth lumbar vertebra (HCC)    Chronic ITP (idiopathic thrombocytopenia) ([facility])    Acute left-sided low back pain with left-sided sciatica    Patient with L5 compression fracture that still shows evidence of edema in the bone now 6 months following initial injury.  She also has pain to her thighs, left greater than right.  MRI showed some collapse of the neuroforamen resulting in some nerve root compression.  Due to her medical issues and advanced age, it is felt that ongoing conservative management with the consideration of an epidural injection was most appropriate.  Patient and family agreeable.  However, platelet count low and must be greater than 100 K for safe epidural administration.  Hematology following and initiated IVIG this morning.  We are hopeful that we will be able to complete infusion and have appropriate platelet count to proceed with epidural later today.  Patient states that she is really wanting to eat and feels that she does not need sedation for the epidural.  We will go ahead and resume diet.  Family to bring in brace to use while patient is up and ambulating with therapy and nursing.      PLAN:  IVIG per hematology  LESI today if PLT count >100K  OK for diet  Daughter to bring in brace for patient to wear when out of bed  Continue therapies     I discussed the patient's findings and my recommendations with patient, family, nursing staff and Dr. Erickson    During patient visit, I utilized appropriate personal protective equipment including mask and gloves.  Mask used was standard procedure mask. Appropriate PPE was worn during the entire visit.  Hand hygiene was completed before and after.      chief complaint, exam, MDM data copied  "forward from previous encounters in EMR is unchanged.        LOS: 0 days       Chanda Pineda, APRN  3/9/2023  08:59 EST    \"Dictated utilizing Dragon dictation\".      "

## 2023-03-09 NOTE — PROGRESS NOTES
"Nutrition Services    Patient Name:  Helena Carmen  YOB: 1931  MRN: [id number]  Admit Date:  3/7/2023    Assessment Date:  03/09/23    CLINICAL NUTRITION ASSESSMENT     Encounter Information         Reason for Encounter Wound    Admitting Diagnosis Bilateral lower back pain without sciatica    Pertinent Medical History Afib, T2DM, CKD stage 4, HTN, hypothyroidism     Current Issues 89 y.o. female with complicated medical hx admitted with low back pain. Discussing lumbar epidural steroid, platelet currently too low. IPF high, receiving IVIG. Nutrition following for charted coccyx wound, wound care reports moisture related. Visited pt at bedside, denies any change in wt or appetite. Provided alternative menus for pt and encouraged adequate PO intake.      Current Nutrition Orders & Evaluation of Intake       Oral Nutrition     Current PO Diet Diet: Cardiac Diets, Diabetic Diets, Renal Diets; Healthy Heart (2-3 Na+); Consistent Carbohydrate; Low Sodium (2-3g), Low Potassium, Low Phosphorus; Texture: Regular Texture (IDDSI 7); Fluid Consistency: Thin (IDDSI 0)   Supplement n/a   PO Evaluation     Trending % PO Intake Pending    --  Anthropometrics          Height    Weight Height: 144.8 cm (57.01\")  Weight: 75.1 kg (165 lb 9.1 oz) (03/07/23 1818)    BMI kg/m2 Body mass index is 35.82 kg/m².    Weight Trend/Change Stable    Weight History  Wt Readings from Last 15 Encounters:   03/07/23 1818 75.1 kg (165 lb 9.1 oz)   03/07/23 1120 72.6 kg (160 lb)   03/07/23 1451 72.6 kg (160 lb)   03/01/23 1428 73.5 kg (162 lb)   01/17/23 1445 72.1 kg (159 lb)   12/28/22 1332 70.8 kg (156 lb)   12/25/22 0407 73.6 kg (162 lb 4.8 oz)   12/24/22 0536 72.2 kg (159 lb 1.6 oz)   12/23/22 0417 72.4 kg (159 lb 9.6 oz)   12/22/22 0502 74.8 kg (164 lb 12.8 oz)   12/21/22 0541 74.7 kg (164 lb 11.2 oz)   12/20/22 1646 70.7 kg (155 lb 14.4 oz)   12/20/22 1059 75.8 kg (167 lb)   11/04/22 1252 73.5 kg (162 lb)   10/16/22 1433 70.3 kg " (155 lb)   09/27/22 1350 73 kg (161 lb)   09/20/22 1415 71.7 kg (158 lb)   09/15/22 1143 71.7 kg (158 lb)   09/14/22 1344 71.7 kg (158 lb)   09/09/22 1319 73.5 kg (162 lb)   09/09/22 1352 73.5 kg (162 lb)   09/06/22 1323 75.2 kg (165 lb 12.8 oz)      --  Labs        Pertinent Labs Reviewed, listed below     Results from last 7 days   Lab Units 03/09/23  0525 03/08/23  0621 03/07/23  1153   SODIUM mmol/L 136 139 137   POTASSIUM mmol/L 4.3 4.4 4.4   CHLORIDE mmol/L 102 104 102   CO2 mmol/L 23.7 24.0 25.1   BUN mg/dL 42* 34* 31*   CREATININE mg/dL 1.73* 1.54* 1.50*   CALCIUM mg/dL 8.0* 8.1* 8.8   BILIRUBIN mg/dL 0.5 0.6 0.5   ALK PHOS U/L 52 51 58   ALT (SGPT) U/L 13 14 19   AST (SGOT) U/L 12 9 15   GLUCOSE mg/dL 220* 191* 79     Results from last 7 days   Lab Units 03/09/23  0525   HEMOGLOBIN g/dL 12.4   HEMATOCRIT % 38.3   WBC 10*3/mm3 6.44   ALBUMIN g/dL 3.5     Results from last 7 days   Lab Units 03/09/23  0525 03/08/23  1719 03/08/23  0621 03/07/23  1153   PLATELETS 10*3/mm3 41* 38* 40* 41*     COVID19   Date Value Ref Range Status   03/07/2023 Not Detected Not Detected - Ref. Range Final     Lab Results   Component Value Date    HGBA1C 6.20 (H) 03/08/2023          Medications            Scheduled Medications amLODIPine, 2.5 mg, Oral, Daily  budesonide-formoterol, 2 puff, Inhalation, BID - RT  carvedilol, 12.5 mg, Oral, BID  cetirizine, 10 mg, Oral, Daily  cholecalciferol, 1,000 Units, Oral, Daily  furosemide, 40 mg, Oral, Daily  gabapentin, 600 mg, Oral, Q12H  hydrALAZINE, 50 mg, Oral, BID  HYDROcodone-acetaminophen, 1 tablet, Oral, Q6H  immune globulin (human), 20 g, Intravenous, Q24H   Followed by  [START ON 3/11/2023] immune globulin (human), 20 g, Intravenous, Q24H   Followed by  [START ON 3/13/2023] immune globulin (human), 20 g, Intravenous, Q24H   Followed by  [START ON 3/15/2023] immune globulin (human), 10 g, Intravenous, Q24H   Followed by  [START ON 3/17/2023] immune globulin (human), 5 g,  Intravenous, Q24H  insulin glargine, 20 Units, Subcutaneous, Nightly  insulin lispro, 0-9 Units, Subcutaneous, TID AC  levothyroxine, 50 mcg, Oral, Q AM  lidocaine, 1 patch, Transdermal, Q24H  montelukast, 10 mg, Oral, Nightly  predniSONE, 10 mg, Oral, Daily With Breakfast  senna-docusate sodium, 2 tablet, Oral, Nightly  sodium chloride, 10 mL, Intravenous, Q12H  tamsulosin, 0.4 mg, Oral, Daily        Infusions      PRN Medications •  acetaminophen **OR** acetaminophen **OR** acetaminophen  •  bisacodyl  •  cetirizine  •  dextrose  •  dextrose  •  glucagon (human recombinant)  •  HYDROmorphone  •  LORazepam  •  methocarbamol  •  nitroglycerin  •  ondansetron  •  ondansetron  •  [COMPLETED] Insert Peripheral IV **AND** sodium chloride  •  sodium chloride  •  sodium chloride     Physical Findings         Skin, GI, Oral, LDA Alert and oriented, obese, fatigue   Skin: Bilateral coccyx  GI: LBM-3/7  Oral: teeth missing     NFPE Not applicable at this time   --  PES STATEMENT / NUTRITION DIAGNOSIS      Nutrition Dx Problem  Problem: Overweight/Obesity  Etiology: Factors Affecting Nutrition (lack of mobility)   Signs/Symptoms: BMI    Comment:      NUTRITION INTERVENTION / PLAN OF CARE  Intervention Goal        Intervention Goal(s) Reduce/improve symptoms, Meet estimated needs and Increase intake     Nutrition Intervention        RD Action Menu provided, Encourage intake, Follow Tx Progress and Care plan reviewed     Nutrition Prescription          Diet      Supplement/Snack    EN/PN      Prescription Ordered Continue same per protocol     Monitor/Evaluation        Monitor Per protocol   Discharge Needs No discharge needs identified at this time   Education Will instruct as appropriate     RD to follow up per protocol.    Electronically signed by:  Ange Lepe RD  03/09/23 14:39 EST

## 2023-03-09 NOTE — SIGNIFICANT NOTE
03/09/23 1426   OTHER   Discipline physical therapist   Rehab Time/Intention   Session Not Performed other (see comments)  (Nurse Radha request hold PT today due to pt receiving IVIG to improve platelets so that she can have epidural injection for pain. will f/u 3/10 if appropriate)   Recommendation   PT - Next Appointment 03/10/23

## 2023-03-09 NOTE — CASE MANAGEMENT/SOCIAL WORK
Continued Stay Note  [facility]     Patient Name: Helena Carmen  MRN: [id number]  Today's Date: 3/9/2023    Admit Date: 3/7/2023    Plan: SNF at [facility]   Discharge Plan     Row Name 03/09/23 1410       Plan    Plan SNF at [facility]    Patient/Family in Agreement with Plan yes    Plan Comments Spoke with Gina/Shani, [facility] has a bed Saturday for patient and can accept. CCP will follow - Ngoc GARAY               Discharge Codes    No documentation.               Expected Discharge Date and Time     Expected Discharge Date Expected Discharge Time    Mar 10, 2023             Ngoc Frost RN

## 2023-03-09 NOTE — PROGRESS NOTES
REASON FOR FOLLOWUP/CHIEF COMPLAINT:   ITP     HISTORY OF PRESENT ILLNESS:   Patient continues to have back pain.  She is laying in bed with no other new complaints.  Neurosurgery recommending epidural to help with the pain.  They want the platelet count to be greater than 100,000    Past Medical History, Past Surgical History, Social History, Family History have been reviewed and are without significant changes except as mentioned.    Review of Systems   Review of Systems   Review of systems as mentioned in the HPI otherwise negative    Medications:  The current medication list was reviewed in the EMR    ALLERGIES:    Allergies   Allergen Reactions   • Erythromycin Unknown (See Comments) and Other (See Comments)     Pt states she does not remember but it was many years ago  Pt states she does not remember but it was many years ago   • Statins Myalgia   • Cephalexin Other (See Comments) and Unknown (See Comments)     June 2022 Pt has tolerated ceftriaxone and cefepime during admission.   Pt states she does not remember reaction but it was many years ago   • Penicillins Rash   • Sulfa Antibiotics Itching and Rash              Vitals:    03/09/23 0700 03/09/23 1100 03/09/23 1257 03/09/23 1340   BP: 172/65 154/69 142/56 164/61   BP Location: Right arm Right arm     Patient Position: Lying Lying     Pulse: 58 62 61 58   Resp: 16 16     Temp: 97.8 °F (36.6 °C)  97.8 °F (36.6 °C) 97.6 °F (36.4 °C)   TempSrc: Oral      SpO2:  98%     Weight:       Height:         Physical Exam    CONSTITUTIONAL:  Vital signs reviewed.  No distress, looks comfortable.  EYES:  Conjunctivae and lids unremarkable.  PERRLA  EARS,NOSE,MOUTH,THROAT:  Ears and nose appear unremarkable.  Lips, teeth, gums appear unremarkable.  RESPIRATORY:  Normal respiratory effort.  Lungs clear to auscultation bilaterally.  CARDIOVASCULAR:  Normal S1, S2.  No murmurs rubs or gallops.  No significant lower extremity edema.  GASTROINTESTINAL: Abdomen  appears unremarkable.  Nontender.  No hepatomegaly.  No splenomegaly.  SKIN:  Warm.  No rashes.  PSYCHIATRIC:  Normal judgment and insight.  Normal mood and affect.       RECENT LABS:  WBC   Date Value Ref Range Status   03/09/2023 6.44 3.40 - 10.80 10*3/mm3 Final   03/08/2023 6.02 3.40 - 10.80 10*3/mm3 Final   03/07/2023 9.18 3.40 - 10.80 10*3/mm3 Final     Hemoglobin   Date Value Ref Range Status   03/09/2023 12.4 12.0 - 15.9 g/dL Final   03/08/2023 12.4 12.0 - 15.9 g/dL Final   03/07/2023 13.2 12.0 - 15.9 g/dL Final     Platelets   Date Value Ref Range Status   03/09/2023 41 (C) 140 - 450 10*3/mm3 Final   03/08/2023 38 (C) 140 - 450 10*3/mm3 Final   03/08/2023 40 (C) 140 - 450 10*3/mm3 Final   03/07/2023 41 (C) 140 - 450 10*3/mm3 Final       ASSESSMENT/PLAN:  Helena Carmen N630/1     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  • Currently on prednisone 10 mg daily.  • Platelet count is around baseline at 41,000  • Due to ongoing back pain an epidural injection is being considered.  • Anesthesiology would require platelet count to be greater than 100,000.    • IPF greater than 10  • We will start treatment with IVIG for quicker response     *Back pain and lower extremity pain  • Secondary to degenerative changes in the lumbar spine.  • Considering epidural injection.  • Require platelet count to be 100,000.  • Treatment with IVIG     *Acute on chronic diastolic congestive heart failure, -management per primary     Recommendations  • IVIG today  • Daily CBC

## 2023-03-09 NOTE — PLAN OF CARE
Goal Outcome Evaluation:           Progress: no change  Outcome Evaluation: Pt received IVIG today, finishing around 6pm. Pain seems to be better controlled. PRN dilaudid and scheduled Norco. Went down for CT today.

## 2023-03-10 PROBLEM — R33.9 URINE RETENTION: Status: ACTIVE | Noted: 2023-03-10

## 2023-03-10 LAB
ANION GAP SERPL CALCULATED.3IONS-SCNC: 10.1 MMOL/L (ref 5–15)
BASOPHILS # BLD AUTO: 0.02 10*3/MM3 (ref 0–0.2)
BASOPHILS NFR BLD AUTO: 0.4 % (ref 0–1.5)
BUN SERPL-MCNC: 44 MG/DL (ref 8–23)
BUN/CREAT SERPL: 26.3 (ref 7–25)
CALCIUM SPEC-SCNC: 8 MG/DL (ref 8.2–9.6)
CHLORIDE SERPL-SCNC: 102 MMOL/L (ref 98–107)
CO2 SERPL-SCNC: 20.9 MMOL/L (ref 22–29)
CREAT SERPL-MCNC: 1.67 MG/DL (ref 0.57–1)
DEPRECATED RDW RBC AUTO: 46.8 FL (ref 37–54)
EGFRCR SERPLBLD CKD-EPI 2021: 28.6 ML/MIN/1.73
EOSINOPHIL # BLD AUTO: 0.03 10*3/MM3 (ref 0–0.4)
EOSINOPHIL NFR BLD AUTO: 0.6 % (ref 0.3–6.2)
ERYTHROCYTE [DISTWIDTH] IN BLOOD BY AUTOMATED COUNT: 14.8 % (ref 12.3–15.4)
GLUCOSE BLDC GLUCOMTR-MCNC: 172 MG/DL (ref 70–130)
GLUCOSE BLDC GLUCOMTR-MCNC: 224 MG/DL (ref 70–130)
GLUCOSE BLDC GLUCOMTR-MCNC: 255 MG/DL (ref 70–130)
GLUCOSE BLDC GLUCOMTR-MCNC: 289 MG/DL (ref 70–130)
GLUCOSE BLDC GLUCOMTR-MCNC: 353 MG/DL (ref 70–130)
GLUCOSE SERPL-MCNC: 203 MG/DL (ref 65–99)
HCT VFR BLD AUTO: 33.7 % (ref 34–46.6)
HGB BLD-MCNC: 11.1 G/DL (ref 12–15.9)
IMM GRANULOCYTES # BLD AUTO: 0.04 10*3/MM3 (ref 0–0.05)
IMM GRANULOCYTES NFR BLD AUTO: 0.8 % (ref 0–0.5)
LYMPHOCYTES # BLD AUTO: 0.83 10*3/MM3 (ref 0.7–3.1)
LYMPHOCYTES NFR BLD AUTO: 17.4 % (ref 19.6–45.3)
MCH RBC QN AUTO: 28.4 PG (ref 26.6–33)
MCHC RBC AUTO-ENTMCNC: 32.9 G/DL (ref 31.5–35.7)
MCV RBC AUTO: 86.2 FL (ref 79–97)
MONOCYTES # BLD AUTO: 0.51 10*3/MM3 (ref 0.1–0.9)
MONOCYTES NFR BLD AUTO: 10.7 % (ref 5–12)
NEUTROPHILS NFR BLD AUTO: 3.34 10*3/MM3 (ref 1.7–7)
NEUTROPHILS NFR BLD AUTO: 70.1 % (ref 42.7–76)
NRBC BLD AUTO-RTO: 0 /100 WBC (ref 0–0.2)
PLATELET # BLD AUTO: 56 10*3/MM3 (ref 140–450)
PMV BLD AUTO: 10.8 FL (ref 6–12)
POTASSIUM SERPL-SCNC: 4.3 MMOL/L (ref 3.5–5.2)
RBC # BLD AUTO: 3.91 10*6/MM3 (ref 3.77–5.28)
SODIUM SERPL-SCNC: 133 MMOL/L (ref 136–145)
WBC NRBC COR # BLD: 4.77 10*3/MM3 (ref 3.4–10.8)

## 2023-03-10 PROCEDURE — 99231 SBSQ HOSP IP/OBS SF/LOW 25: CPT | Performed by: NURSE PRACTITIONER

## 2023-03-10 PROCEDURE — 85025 COMPLETE CBC W/AUTO DIFF WBC: CPT | Performed by: HOSPITALIST

## 2023-03-10 PROCEDURE — 80048 BASIC METABOLIC PNL TOTAL CA: CPT | Performed by: HOSPITALIST

## 2023-03-10 PROCEDURE — 63710000001 PREDNISONE PER 5 MG: Performed by: INTERNAL MEDICINE

## 2023-03-10 PROCEDURE — 25010000002 IMMUNE GLOBULIN (HUMAN) 20 GM/200ML SOLUTION: Performed by: INTERNAL MEDICINE

## 2023-03-10 PROCEDURE — 63710000001 INSULIN LISPRO (HUMAN) PER 5 UNITS: Performed by: INTERNAL MEDICINE

## 2023-03-10 PROCEDURE — 94761 N-INVAS EAR/PLS OXIMETRY MLT: CPT

## 2023-03-10 PROCEDURE — 82962 GLUCOSE BLOOD TEST: CPT

## 2023-03-10 PROCEDURE — 99232 SBSQ HOSP IP/OBS MODERATE 35: CPT | Performed by: INTERNAL MEDICINE

## 2023-03-10 PROCEDURE — 94799 UNLISTED PULMONARY SVC/PX: CPT

## 2023-03-10 PROCEDURE — 97110 THERAPEUTIC EXERCISES: CPT

## 2023-03-10 PROCEDURE — 63710000001 INSULIN LISPRO (HUMAN) PER 5 UNITS: Performed by: NURSE PRACTITIONER

## 2023-03-10 RX ORDER — INSULIN LISPRO 100 [IU]/ML
12 INJECTION, SOLUTION INTRAVENOUS; SUBCUTANEOUS ONCE
Status: COMPLETED | OUTPATIENT
Start: 2023-03-10 | End: 2023-03-10

## 2023-03-10 RX ADMIN — HYDROCODONE BITARTRATE AND ACETAMINOPHEN 1 TABLET: 7.5; 325 TABLET ORAL at 01:38

## 2023-03-10 RX ADMIN — FUROSEMIDE 40 MG: 40 TABLET ORAL at 09:15

## 2023-03-10 RX ADMIN — AMLODIPINE BESYLATE 2.5 MG: 2.5 TABLET ORAL at 09:15

## 2023-03-10 RX ADMIN — Medication 1000 UNITS: at 09:15

## 2023-03-10 RX ADMIN — GABAPENTIN 600 MG: 300 CAPSULE ORAL at 09:15

## 2023-03-10 RX ADMIN — TAMSULOSIN HYDROCHLORIDE 0.4 MG: 0.4 CAPSULE ORAL at 09:15

## 2023-03-10 RX ADMIN — HYDROCODONE BITARTRATE AND ACETAMINOPHEN 1 TABLET: 7.5; 325 TABLET ORAL at 12:45

## 2023-03-10 RX ADMIN — Medication 10 ML: at 09:14

## 2023-03-10 RX ADMIN — INSULIN LISPRO 6 UNITS: 100 INJECTION, SOLUTION INTRAVENOUS; SUBCUTANEOUS at 12:45

## 2023-03-10 RX ADMIN — LIDOCAINE 1 PATCH: 50 PATCH CUTANEOUS at 23:18

## 2023-03-10 RX ADMIN — MONTELUKAST SODIUM 10 MG: 10 TABLET, FILM COATED ORAL at 20:01

## 2023-03-10 RX ADMIN — LEVOTHYROXINE SODIUM 50 MCG: 0.05 TABLET ORAL at 06:18

## 2023-03-10 RX ADMIN — INSULIN LISPRO 2 UNITS: 100 INJECTION, SOLUTION INTRAVENOUS; SUBCUTANEOUS at 09:14

## 2023-03-10 RX ADMIN — CETIRIZINE HYDROCHLORIDE 10 MG: 10 TABLET ORAL at 09:15

## 2023-03-10 RX ADMIN — LORAZEPAM 0.5 MG: 0.5 TABLET ORAL at 21:01

## 2023-03-10 RX ADMIN — HYDROCODONE BITARTRATE AND ACETAMINOPHEN 1 TABLET: 7.5; 325 TABLET ORAL at 20:01

## 2023-03-10 RX ADMIN — HYDRALAZINE HYDROCHLORIDE 50 MG: 50 TABLET ORAL at 20:01

## 2023-03-10 RX ADMIN — DOCUSATE SODIUM 50MG AND SENNOSIDES 8.6MG 2 TABLET: 8.6; 5 TABLET, FILM COATED ORAL at 20:01

## 2023-03-10 RX ADMIN — HYDROCODONE BITARTRATE AND ACETAMINOPHEN 1 TABLET: 7.5; 325 TABLET ORAL at 06:57

## 2023-03-10 RX ADMIN — PREDNISONE 10 MG: 10 TABLET ORAL at 09:15

## 2023-03-10 RX ADMIN — IMMUNE GLOBULIN (HUMAN) 20 G: 10 INJECTION INTRAVENOUS; SUBCUTANEOUS at 13:24

## 2023-03-10 RX ADMIN — INSULIN LISPRO 12 UNITS: 100 INJECTION, SOLUTION INTRAVENOUS; SUBCUTANEOUS at 21:01

## 2023-03-10 RX ADMIN — INSULIN GLARGINE-YFGN 20 UNITS: 100 INJECTION, SOLUTION SUBCUTANEOUS at 20:01

## 2023-03-10 RX ADMIN — BUDESONIDE AND FORMOTEROL FUMARATE DIHYDRATE 2 PUFF: 160; 4.5 AEROSOL RESPIRATORY (INHALATION) at 10:32

## 2023-03-10 RX ADMIN — CARVEDILOL 12.5 MG: 12.5 TABLET, FILM COATED ORAL at 20:01

## 2023-03-10 RX ADMIN — GABAPENTIN 600 MG: 300 CAPSULE ORAL at 20:01

## 2023-03-10 RX ADMIN — Medication 10 ML: at 20:01

## 2023-03-10 RX ADMIN — BUDESONIDE AND FORMOTEROL FUMARATE DIHYDRATE 2 PUFF: 160; 4.5 AEROSOL RESPIRATORY (INHALATION) at 19:47

## 2023-03-10 RX ADMIN — HYDRALAZINE HYDROCHLORIDE 50 MG: 50 TABLET ORAL at 09:15

## 2023-03-10 RX ADMIN — CARVEDILOL 12.5 MG: 12.5 TABLET, FILM COATED ORAL at 09:15

## 2023-03-10 RX ADMIN — INSULIN LISPRO 4 UNITS: 100 INJECTION, SOLUTION INTRAVENOUS; SUBCUTANEOUS at 17:43

## 2023-03-10 NOTE — PROGRESS NOTES
[facility]OS[facility] PROGRESS NOTE      CC: Back and bilateral leg pain L > R      Subjective     Interval History: Back pain better today.  Still having left leg pain with standing and movement.  Platelet count 56.  Unable to have LESI today.  Had BM.    ROS:  Constitutional: No fever, chills  MS: back pain  Neuro: Leg pain  : No difficulty voiding, no incontinence    Objective     Vital signs in last 24 hours:  Temp:  [97.2 °F (36.2 °C)-97.9 °F (36.6 °C)] 97.9 °F (36.6 °C)  Heart Rate:  [57-74] 57  Resp:  [16-18] 16  BP: (166-184)/(60-86) 180/66    Intake/Output this shift:  I/O this shift:  In: 360 [P.O.:360]  Out: -     LABS:  Results from last 7 days   Lab Units 03/10/23  0523 03/09/23  0525 03/08/23  1719 03/08/23  0621   WBC 10*3/mm3 4.77 6.44  --  6.02   HEMOGLOBIN g/dL 11.1* 12.4  --  12.4   HEMATOCRIT % 33.7* 38.3  --  39.0   PLATELETS 10*3/mm3 56* 41* 38* 40*     Results from last 7 days   Lab Units 03/10/23  0523 03/09/23  0525 03/08/23  0621 03/07/23  1153   SODIUM mmol/L 133* 136 139 137   POTASSIUM mmol/L 4.3 4.3 4.4 4.4   CHLORIDE mmol/L 102 102 104 102   CO2 mmol/L 20.9* 23.7 24.0 25.1   BUN mg/dL 44* 42* 34* 31*   CREATININE mg/dL 1.67* 1.73* 1.54* 1.50*   CALCIUM mg/dL 8.0* 8.0* 8.1* 8.8   BILIRUBIN mg/dL  --  0.5 0.6 0.5   ALK PHOS U/L  --  52 51 58   ALT (SGPT) U/L  --  13 14 19   AST (SGOT) U/L  --  12 9 15   GLUCOSE mg/dL 203* 220* 191* 79     IMAGING STUDIES:  No new imaging    I personally viewed and interpreted the patient's chart    Meds reviewed/changed: Yes  Gabapentin 600 mg twice daily  Norco 7.5 mg p.o. every 6 hours  Lidoderm patch every 24 hours  Prednisone 10 mg daily  Dilaudid 0.25 mg IV every 2 hours as needed-2 doses    Physical Exam:    General:   Awake, alert.  Sitting up in bed.  Visiting with family.  Appears comfortable at present  Back:    Left straight leg raise causes low back and lateral leg pain  Motor: Normal strength in legs.    Station and Gait:     "         Not tested.  PT eval deferred yesterday due to receiving IVIG  Extremities:   Wearing SCD      Assessment & Plan     ASSESSMENT:      Bilateral low back pain without sciatica, unspecified chronicity    Legally blind    Essential hypertension    Paroxysmal atrial fibrillation (HCC)    Stage 4 chronic kidney disease (HCC)    Chronic pain disorder    Type 2 diabetes mellitus with hyperglycemia (HCC)    Macular degeneration    Hypothyroidism    Closed fracture of fifth lumbar vertebra (HCC)    Chronic ITP (idiopathic thrombocytopenia) (HCC)    Acute left-sided low back pain with left-sided sciatica    Urine retention    Patient still awaiting lumbar epidural injection for back and left leg pain.  Known L5 compression fracture and neuroforaminal compression.  Unfortunately, platelet count has not reached recommended level of 100 K.  Currently receiving IVIG per hematology service.  Recommend keeping inpatient for weekend and hopefully obtaining LESI on Monday if platelet count greater than 100 K.  Brace has been brought by family.  Patient okay for out of bed activity with brace on.  Continue therapies through weekend.  Call for questions or concerns over weekend.  We will see her Monday.      PLAN:  CBC Mon AM  Cont IVIG per hematology  LESI Monday if PLT count >100K  No need for n.p.o. for LESI, patient declined sedation  Continue therapies and OOB with brace    I discussed the patient's findings and my recommendations with patient, family, nursing staff and Dr. Erickson    During patient visit, I utilized appropriate personal protective equipment including mask and gloves.  Mask used was standard procedure mask. Appropriate PPE was worn during the entire visit.  Hand hygiene was completed before and after.      chief complaint,  Exam, data copied forward from previous encounters in EMR is unchanged.        LOS: 1 day       Chanda Pineda, APRN  3/10/2023  14:00 EST    \"Dictated utilizing Dragon dictation\".  "

## 2023-03-10 NOTE — NURSING NOTE
Patient states she has had recent issues with urinary retention and was to see Urologist Dr Webber. She believes her appointment may have been scheduled for today.     Patient unable to void while laying in bed. Voided 75 in purewick as soon as she sat up. Then she stopped. We got her up to pivot to bedside commode and she went on the floor. Output in BSC was 150. Post void bladder scan = 673.   Straight cath'd patient and got out 700.

## 2023-03-10 NOTE — NURSING NOTE
Patient states she has recent DVTs and would like for her legs to be checked for clots before placing the SCDs on. Will pass this along to dayshift in report.

## 2023-03-10 NOTE — NURSING NOTE
Patient not urinating very much overnight. Per CT, she has distended bladder. She has a purewick in place. I asked the patient if she thinks she could urinate, she said she doesn't feel like she needs to. NA bladder scanned the patient and was over 900. The patient stated she stood up earlier with family and was able to urinate better so we did that. Output was 350. Bladder scan post void = >490. Got bedside commode and patient tried to urinate some more (maybe back pain caused her to need to sit back down in bed from standing & urinating). Patient was able to urinate about 50cc more, and there was a little on the floor and in her brief. Post void bladder scan now is 436. I told the patient we will try to get on bedside commode again at 0600 to see if she can get more out before having to straight cath.

## 2023-03-10 NOTE — THERAPY TREATMENT NOTE
Patient Name: Helena Carmen  : 1931    MRN: [id number]                              Today's Date: 3/10/2023       Admit Date: 3/7/2023    Visit Dx:     ICD-10-CM ICD-9-CM   1. Acute on chronic bilateral low back pain  M54.50 724.2   2. Unable to walk  R26.2 719.7   3. Generalized weakness  R53.1 780.79   4. Elevated troponin  R77.8 790.6   5. Thrombocytopenia (HCC)  D69.6 287.5   6. History of ITP  Z86.2 V12.3   7. Chronic kidney disease, unspecified CKD stage  N18.9 585.9   8. Closed compression fracture of L5 lumbar vertebra, with delayed healing, subsequent encounter  S32.050G V54.17     Patient Active Problem List   Diagnosis   • Aortic valve stenosis   • Fatigue   • MI (mitral incompetence)   • PVC (premature ventricular contraction)   • Legally blind   • Essential hypertension   • Hypertriglyceridemia   • Pulmonary hypertension (HCC)   • Paroxysmal atrial fibrillation (HCC)   • Anxiety   • Stage 4 chronic kidney disease (HCC)   • Chronic pain disorder   • Degeneration of lumbar intervertebral disc   • Type 2 diabetes mellitus with hyperglycemia (HCC)   • Esophageal reflux   • Gastroparesis   • Hiatal hernia   • Iatrogenic hypothyroidism   • Macular degeneration   • Malignant neoplasm of uterus (HCC)   • Osteoarthritis of knee   • Peripheral nerve disease   • Secondary hyperparathyroidism (HCC)   • Thrombocytopenia (HCC)   • Chronic heart failure with preserved ejection fraction (HCC)   • Other cirrhosis of liver (HCC)   • Hypothyroidism   • Nonrheumatic tricuspid valve regurgitation   • Anemia, chronic disease   • Closed fracture of fifth lumbar vertebra (HCC)   • Closed fracture of fifth lumbar vertebra, unspecified fracture morphology, initial encounter ([facility])   • Diabetic polyneuropathy associated with type 2 diabetes mellitus (HCC)   • Chronic ITP (idiopathic thrombocytopenia) ([facility])   • Chronic midline low back pain with bilateral sciatica   • Acute deep vein thrombosis of calf, bilateral (HCC)   •  Bilateral low back pain without sciatica, unspecified chronicity   • Acute left-sided low back pain with left-sided sciatica   • Urine retention     Past Medical History:   Diagnosis Date   • Aortic valve stenosis     s/p tissue AVR   • Back pain    • CKD (chronic kidney disease)    • Colitis due to Clostridioides difficile 01/26/2022   • Diastolic dysfunction     Grade 2 per echocardiogram 2013   • Diverticulosis    • Exertional shortness of breath    • Heart disease    • Hiatal hernia    • Hyperlipidemia    • Hypertension    • Hyperthyroidism    • Hypertriglyceridemia 05/31/2018   • Hypothyroidism    • L5 compression fracture (HCC) 08/2022   • Left ventricular hypertrophy    • Legally blind    • Liver disease    • Macular degeneration    • Mitral regurgitation    • Osteoarthritis of hip    • Osteoporosis    • Pancreatitis 01/26/2022   • Paroxysmal atrial fibrillation (HCC)    • Premature ventricular contractions    • Pulmonary hypertension (HCC)    • Renal insufficiency syndrome    • Type 2 diabetes mellitus (HCC)    • Uterine cancer (HCC)      Past Surgical History:   Procedure Laterality Date   • AORTIC VALVE REPAIR/REPLACEMENT     • CATARACT EXTRACTION      1970, 1999   • CHOLECYSTECTOMY     • ENDOSCOPY  08/15/2014    no gross lesions in stomach/duodenum, erythrematous mucosa in stomach   • HYSTERECTOMY  2007   • STERNOTOMY        General Information     Row Name 03/10/23 1459          Physical Therapy Time and Intention    Document Type therapy note (daily note) (P)   -MM     Mode of Treatment physical therapy (P)   -MM     Row Name 03/10/23 1459          Cognition    Orientation Status (Cognition) oriented x 3 (P)   -MM     Row Name 03/10/23 1459          Safety Issues, Functional Mobility    Impairments Affecting Function (Mobility) endurance/activity tolerance;shortness of breath;strength;pain;range of motion (ROM) (P)   -MM           User Key  (r) = Recorded By, (t) = Taken By, (c) = Cosigned By     Initials Name Provider Type    Farhana Tarango PTA Student PTA Student               Mobility     Row Name 03/10/23 1459          Bed Mobility    Supine-Sit [place] (Bed Mobility) not tested (P)   -MM     Sit-Supine [place] (Bed Mobility) not tested (P)   -MM     Comment, (Bed Mobility) [place] [facility] [place] nursing stated that 2 people had to help get her in and out of bed due to her low back pain. (P)   -MM     Row Name 03/10/23 1459          Bed-Chair Transfer    Bed-Chair [place] (Transfers) not tested (P)   -MM     Row Name 03/10/23 1459          Sit-Stand Transfer    Sit-Stand [place] (Transfers) not tested (P)   -MM     Row Name 03/10/23 1459          Gait/Stairs (Locomotion)    [place] Level (Gait) not tested (P)   -MM           User Key  (r) = Recorded By, (t) = Taken By, (c) = Cosigned By    Initials Name Provider Type    Farhana Tarango PTA Student PTA Student               Obj/Interventions     Row Name 03/10/23 1501          Motor Skills    Therapeutic Exercise -- (P)   Pt supine in bed BLE therex, SAQ, AP, glute sets, heel slides, and hip abd x10 each  -MM           User Key  (r) = Recorded By, (t) = Taken By, (c) = Cosigned By    Initials Name Provider Type    Farhana Tarango PTA Student PTA Student               Goals/Plan    No documentation.                Clinical Impression     Row Name 03/10/23 1503          Pain    Pain Location lower (P)   -MM     Pain Location - back;hip (P)   -MM     Pre/Posttreatment Pain Comment pt did not rate pain (P)   -MM     Row Name 03/10/23 1503          Plan of Care Review    Outcome Evaluation [place] [facility] [place] nursing with an assist of 2, but she agreed to complete bed therex. Pt reported pain in her lower back and her L hip. Pt performed BLE exercises, allow time for rest breaks and giving VC. Pt would benefit from continued therapy for strengthening and mobility.  (P)   -MM     Row Name 03/10/23 1503          Positioning and Restraints    Pre-Treatment Position in bed (P)   -MM     Post Treatment Position bed (P)   -MM     In Bed supine;call light within reach;encouraged to call for assist;exit alarm on;side rails up x2 (P)   -MM           User Key  (r) = Recorded By, (t) = Taken By, (c) = Cosigned By    Initials Name Provider Type    Farhana Tarango, PTA Student PTA Student               Outcome Measures     Row Name 03/10/23 1507          How much help from another person do you currently need...    Turning from your back to your side while in flat bed without using bedrails? 2 (P)   -MM     Moving from lying on back to sitting on the side of a flat bed without bedrails? 1 (P)   -MM     Moving to and from a bed to a chair (including a wheelchair)? 1 (P)   -MM     Standing up from a chair using your arms (e.g., wheelchair, bedside chair)? 2 (P)   -MM     Climbing 3-5 steps with a railing? 1 (P)   -MM     To walk in hospital room? 1 (P)   -MM     AM-PAC 6 Clicks Score (PT) 8 (P)   -MM     Highest level of mobility 3 --> Sat at edge of bed (P)   -MM           User Key  (r) = Recorded By, (t) = Taken By, (c) = Cosigned By    Initials Name Provider Type    Farhana Tarango, PTA Student PTA Student                             Physical Therapy Education     Title: PT OT SLP Therapies (In Progress)     Topic: Physical Therapy (In Progress)     Point: Mobility training (Done)     Learning Progress Summary           Patient Acceptance, E, VU by EL at 3/8/2023 1030                   Point: Home exercise program (Not Started)     Learner Progress:  Not documented in this visit.          Point: Body mechanics (Done)     Learning Progress Summary           Patient Acceptance, E, VU by EL at 3/8/2023 1030                   Point: Precautions (Not Started)     Learner Progress:  Not documented in this visit.                      User Key     Initials Effective Dates Name Provider Type  Discipline    EL 11/16/21 -  Teresa Hoyos, PT Physical Therapist PT              PT Recommendation and Plan     Outcome Evaluation: (P) [facility] nursing with an assist of 2, but she agreed to complete bed therex. Pt reported pain in her lower back and her L hip. Pt performed BLE exercises, allow time for rest breaks and giving VC. Pt would benefit from continued therapy for strengthening and mobility.     Time Calculation:    PT Charges     Row Name 03/10/23 1458             Time Calculation    Start Time 1425 (P)   -MM      Stop Time 1438 (P)   -MM      Time Calculation (min) 13 min (P)   -MM      PT Received On 03/10/23 (P)   -MM      PT - Next Appointment 03/11/23 (P)   -MM         Time Calculation- PT    Total Timed Code Minutes- PT 13 minute(s) (P)   -MM            User Key  (r) = Recorded By, (t) = Taken By, (c) = Cosigned By    Initials Name Provider Type    Farhana Tarango PTA Student PTA Student              Therapy Charges for Today     Code Description Service Date Service Provider Modifiers Qty    [id number] HC PT THER PROC EA 15 MIN 3/10/2023 Farhana Najera PTA Student GP 1          PT G-Codes  Outcome Measure Options: AM-PAC 6 Clicks Basic Mobility (PT)  AM-PAC 6 Clicks Score (PT): (P) 8  AM-PAC 6 Clicks Score (OT): 16  Modified [place] Scale: 4 - Moderately severe disability.  Unable to walk without assistance, and unable to attend to own bodily needs without assistance.       Farhana Najera PTA Student  3/10/2023

## 2023-03-10 NOTE — PLAN OF CARE
Problem: Adult Inpatient Plan of Care  Goal: Plan of Care Review  Outcome: Ongoing, Progressing  Flowsheets (Taken 3/10/2023 7443)  Progress: no change  Plan of Care Reviewed With: patient  Outcome Evaluation:   VSS, BP slightly elev   SR-SB on monitor w/BBB & depressed ST   1L NC O2   Q2H turn   bladder scan, monitor urine output - retention   BM overnight   blood sugars elevated overnight   will cont to monitor   Goal Outcome Evaluation:  Plan of Care Reviewed With: patient        Progress: no change  Outcome Evaluation: VSS, BP slightly elev; SR-SB on monitor w/BBB & depressed ST; 1L NC O2; Q2H turn; bladder scan, monitor urine output - retention; BM overnight; blood sugars elevated overnight; will cont to monitor

## 2023-03-10 NOTE — CONSULTS
FIRST [facility] CONSULT      Patient Identification:  NAME:  Helena Carmen  Age:  89 y.o.   Sex:  female   :  1931   MRN:  [id number]       Chief complaint: Retention    History of present illness:    Helena Carmen is a 89 y.o. patient with:    1. Urinary retention- on flomax, long h/o urinary retention, used to CIC but has been voiding spontaneously with leakage in between    2.   CKD- baseline sCr 1.6- 1.9    3. Incontinence- due to mobility status    CT independently read and reviewed: no hydronephrosis or stones, distended bladder     Lab Results   Component Value Date    CREATININE 1.67 (H) 03/10/2023     Lab Results   Component Value Date    WBC 4.77 03/10/2023             Past medical history:  Past Medical History:   Diagnosis Date   • Aortic valve stenosis     s/p tissue AVR   • Back pain    • CKD (chronic kidney disease)    • Colitis due to Clostridioides difficile 2022   • Diastolic dysfunction     Grade 2 per echocardiogram    • Diverticulosis    • Exertional shortness of breath    • Heart disease    • Hiatal hernia    • Hyperlipidemia    • Hypertension    • Hyperthyroidism    • Hypertriglyceridemia 2018   • Hypothyroidism    • L5 compression fracture (HCC) 2022   • Left ventricular hypertrophy    • Legally blind    • Liver disease    • Macular degeneration    • Mitral regurgitation    • Osteoarthritis of hip    • Osteoporosis    • Pancreatitis 2022   • Paroxysmal atrial fibrillation (HCC)    • Premature ventricular contractions    • Pulmonary hypertension (HCC)    • Renal insufficiency syndrome    • Type 2 diabetes mellitus (HCC)    • Uterine cancer (HCC)        Past surgical history:  Past Surgical History:   Procedure Laterality Date   • AORTIC VALVE REPAIR/REPLACEMENT     • CATARACT EXTRACTION      ,    • CHOLECYSTECTOMY     • ENDOSCOPY  08/15/2014    no gross lesions in stomach/duodenum, erythrematous mucosa in stomach   • HYSTERECTOMY     • STERNOTOMY          Allergies:  Erythromycin, Statins, Cephalexin, Penicillins, and Sulfa antibiotics    Home medications:  Medications Prior to Admission   Medication Sig Dispense Refill Last Dose   • amLODIPine (NORVASC) 2.5 MG tablet Take 1 tablet by mouth Daily. 30 tablet 11 3/6/2023   • carvedilol (COREG) 12.5 MG tablet Take 1 tablet by mouth 2 (Two) Times a Day. 60 tablet 11 3/6/2023   • cetirizine (zyrTEC) 5 MG tablet Take 1 tablet by mouth Daily As Needed for Allergies.   3/6/2023   • cholecalciferol (VITAMIN D3) 25 MCG (1000 UT) tablet Take 1 tablet by mouth Daily.   3/6/2023   • fluticasone-salmeterol (Advair HFA) 115-21 MCG/ACT inhaler Inhale 2 puffs 2 (Two) Times a Day. 12 each 11 3/6/2023   • furosemide (LASIX) 40 MG tablet Take 1 tablet by mouth Daily for 30 days. 30 tablet 0 3/6/2023   • gabapentin (NEURONTIN) 600 MG tablet 1 tablet 2 (Two) Times a Day.   3/6/2023   • hydrALAZINE (APRESOLINE) 25 MG tablet Take 2 tablets by mouth 2 (Two) Times a Day.   3/6/2023   • HYDROcodone-acetaminophen (NORCO) 7.5-325 MG per tablet Take 1 tablet by mouth Every 6 (Six) Hours.      • insulin glargine (LANTUS, SEMGLEE) 100 UNIT/ML injection Inject 20 Units under the skin into the appropriate area as directed Every Night.   3/6/2023   • insulin lispro (humaLOG) 100 UNIT/ML injection Inject 0-10 Units under the skin into the appropriate area as directed 3 (Three) Times a Day Before Meals. 2 units for every 50 > 150   3/6/2023   • levothyroxine (SYNTHROID, LEVOTHROID) 25 MCG tablet Take 2 tablets by mouth Daily.   3/6/2023   • lidocaine (LIDODERM) 5 % Place 1 patch on the skin as directed by provider Daily. Remove & Discard patch within 12 hours or as directed by MD 6 each 0 3/6/2023   • Loratadine (CLARITIN PO) Take  by mouth.   3/6/2023   • LORazepam (ATIVAN) 0.5 MG tablet Take 1 tablet by mouth Every 8 (Eight) Hours As Needed for Anxiety. 10 tablet 0 3/6/2023   • methocarbamol (ROBAXIN) 750 MG tablet Take 1 tablet by mouth Every  8 (Eight) Hours As Needed for Muscle Spasms.   3/6/2023   • montelukast (SINGULAIR) 10 MG tablet Take 1 tablet by mouth Every Night.   3/6/2023   • ondansetron (ZOFRAN) 8 MG tablet Take 1 tablet by mouth As Needed.   3/6/2023   • polyethylene glycol (MIRALAX) 17 GM/SCOOP powder Take 17 g by mouth Daily.   3/6/2023   • predniSONE (DELTASONE) 5 MG tablet Take 2 tablets by mouth Daily With Breakfast. 60 tablet 1 3/6/2023   • tamsulosin (FLOMAX) 0.4 MG capsule 24 hr capsule Take 1 capsule by mouth every night at bedtime.   3/6/2023        Hospital medications:  amLODIPine, 2.5 mg, Oral, Daily  budesonide-formoterol, 2 puff, Inhalation, BID - RT  carvedilol, 12.5 mg, Oral, BID  cetirizine, 10 mg, Oral, Daily  cholecalciferol, 1,000 Units, Oral, Daily  furosemide, 40 mg, Oral, Daily  gabapentin, 600 mg, Oral, Q12H  hydrALAZINE, 50 mg, Oral, BID  HYDROcodone-acetaminophen, 1 tablet, Oral, Q6H  [START ON 3/11/2023] immune globulin (human), 20 g, Intravenous, Q24H   Followed by  [START ON 3/13/2023] immune globulin (human), 20 g, Intravenous, Q24H   Followed by  [START ON 3/15/2023] immune globulin (human), 10 g, Intravenous, Q24H   Followed by  [START ON 3/17/2023] immune globulin (human), 5 g, Intravenous, Q24H  insulin glargine, 20 Units, Subcutaneous, Nightly  insulin lispro, 0-9 Units, Subcutaneous, TID AC  levothyroxine, 50 mcg, Oral, Q AM  lidocaine, 1 patch, Transdermal, Q24H  montelukast, 10 mg, Oral, Nightly  predniSONE, 10 mg, Oral, Daily With Breakfast  senna-docusate sodium, 2 tablet, Oral, Nightly  sodium chloride, 10 mL, Intravenous, Q12H  tamsulosin, 0.4 mg, Oral, Daily         •  acetaminophen **OR** acetaminophen **OR** acetaminophen  •  bisacodyl  •  cetirizine  •  dextrose  •  dextrose  •  glucagon (human recombinant)  •  HYDROmorphone  •  LORazepam  •  methocarbamol  •  nitroglycerin  •  ondansetron  •  ondansetron  •  [COMPLETED] Insert Peripheral IV **AND** sodium chloride  •  sodium chloride  •   sodium chloride    Family history:  Family History   Problem Relation Age of Onset   • Heart disease Mother    • Hypertension Mother    • Stroke Mother    • Diabetes Mother         mellitus   • Other Other         cardiovascular disorder       Social history:  Social History     Tobacco Use   • Smoking status: Former     Packs/day: 0.50     Types: Cigarettes     Quit date: 7/10/1969     Years since quittin.7   • Smokeless tobacco: Never   Vaping Use   • Vaping Use: Never used   Substance Use Topics   • Alcohol use: No     Comment: caffeine use - coffee 2 cups daily   • Drug use: No       REVIEW OF SYSTEMS:  Constitutional - Negative for fevers/chills  Eyes/Ears/Nose/Mouth/Throat - Negative for changes in vision  Cardiovascular - Negative for chest pain, dysrhythmia  Respiratory - Negative for dyspnea  Gastrointestinal - Negative for nausea or vomiting  Genitourinary - Negative for dysuria  Hematologic/Lymphatic - Negative for bruising  Skin - Negative for erythema  Endocrine - Negative for polyuria    Objective:  TMax 24 hours:   Temp (24hrs), Av.5 °F (36.4 °C), Min:97.2 °F (36.2 °C), Max:97.7 °F (36.5 °C)      Vitals Ranges:   Temp:  [97.2 °F (36.2 °C)-97.7 °F (36.5 °C)] 97.7 °F (36.5 °C)  Heart Rate:  [57-74] 58  Resp:  [16-18] 18  BP: (164-184)/(60-86) 184/67    Intake/Output Last 3 shifts:  I/O last 3 completed shifts:  In: 750 [IV Piggyback:750]  Out: 1000 [Urine:1000]     Physical Exam:    General Appearance:    Alert, cooperative, NAD   HEENT:    Noromocephalic   Back:     No CVA tenderness   Lungs:     Respirations unlabored    Heart:    Reg rate   Abdomen:     Soft, nttp   :    Normal ext genitalia   Extremities:   No deformity   Lymphatic:   No neck or groin LAD   Skin:   Warm and dry   Neuro/Psych:   AAOx3       Results review:   I reviewed the patient's new clinical results.    Data review:  Lab Results (last 24 hours)     Procedure Component Value Units Date/Time    POC Glucose Once  [[id number]]  (Abnormal) Collected: 03/10/23 1116    Specimen: Blood Updated: 03/10/23 1120     Glucose 289 mg/dL      Comment: Meter: [id number] : [id number] Roberth Garciaee ELIZABETH       POC Glucose Once [[id number]]  (Abnormal) Collected: 03/10/23 0639    Specimen: Blood Updated: 03/10/23 0641     Glucose 172 mg/dL      Comment: Meter: [id number] : [id number] Vinnie Shaila ELIZABETH       Basic Metabolic Panel [[id number]]  (Abnormal) Collected: 03/10/23 0523    Specimen: Blood Updated: 03/10/23 0629     Glucose 203 mg/dL      BUN 44 mg/dL      Creatinine 1.67 mg/dL      Sodium 133 mmol/L      Potassium 4.3 mmol/L      Chloride 102 mmol/L      CO2 20.9 mmol/L      Calcium 8.0 mg/dL      BUN/Creatinine Ratio 26.3     Anion Gap 10.1 mmol/L      eGFR 28.6 mL/min/1.73     Narrative:      GFR Normal >60  Chronic Kidney Disease <60  Kidney Failure <15    The GFR formula is only valid for adults with stable renal function between ages 18 and 70.    CBC & Differential [[id number]]  (Abnormal) Collected: 03/10/23 0523    Specimen: Blood Updated: 03/10/23 0601    Narrative:      The following orders were created for panel order CBC & Differential.  Procedure                               Abnormality         Status                     ---------                               -----------         ------                     CBC Auto Differential[[id number]]        Abnormal            Final result                 Please view results for these tests on the individual orders.    CBC Auto Differential [[id number]]  (Abnormal) Collected: 03/10/23 0523    Specimen: Blood Updated: 03/10/23 0601     WBC 4.77 10*3/mm3      RBC 3.91 10*6/mm3      Hemoglobin 11.1 g/dL      Hematocrit 33.7 %      MCV 86.2 fL      MCH 28.4 pg      MCHC 32.9 g/dL      RDW 14.8 %      RDW-SD 46.8 fl      MPV 10.8 fL      Platelets 56 10*3/mm3      Neutrophil % 70.1 %      Lymphocyte % 17.4 %      Monocyte % 10.7 %      Eosinophil % 0.6 %      Basophil % 0.4 %       Immature Grans % 0.8 %      Neutrophils, Absolute 3.34 10*3/mm3      Lymphocytes, Absolute 0.83 10*3/mm3      Monocytes, Absolute 0.51 10*3/mm3      Eosinophils, Absolute 0.03 10*3/mm3      Basophils, Absolute 0.02 10*3/mm3      Immature Grans, Absolute 0.04 10*3/mm3      nRBC 0.0 /100 WBC     POC Glucose Once [[id number]]  (Abnormal) Collected: 03/10/23 0207    Specimen: Blood Updated: 03/10/23 0209     Glucose 255 mg/dL      Comment: Meter: [id number] : [id number] Mariposa Forman RN       POC Glucose Once [[id number]]  (Abnormal) Collected: 03/09/23 1955    Specimen: Blood Updated: 03/09/23 1956     Glucose 357 mg/dL      Comment: Meter: [id number] : [id number] Vinnie JOHNSON       POC Glucose Once [[id number]]  (Abnormal) Collected: 03/09/23 1631    Specimen: Blood Updated: 03/09/23 1633     Glucose 381 mg/dL      Comment: Meter: [id number] : [id number] Jarrod JOHNSON              Imaging:  Imaging Results (Last 24 Hours)     Procedure Component Value Units Date/Time    CT Abdomen Pelvis Stone Protocol [[id number]] Collected: 03/09/23 1555     Updated: 03/09/23 1609    Narrative:      CT ABDOMEN AND PELVIS WITH IV CONTRAST     HISTORY: Neurogenic bladder. Abdominal pain.     TECHNIQUE: Radiation dose reduction techniques were utilized, including  automated exposure control and exposure modulation based on body size.   3 mm images were obtained through the abdomen and pelvis after the  administration of IV contrast.      COMPARISON: MRI 03/07/2023, 10/16/2022     FINDINGS:  Lower chest: The heart size is stable. Dense calcification about the  mitral valve. Atherosclerosis thoracoabdominal aorta and branch vessels.  Bibasilar atelectasis with trace pleural fluid slightly increased from  the prior. Pleural and parenchymal scarring. Calcified granulomas.  Recommend follow-up to resolution after treatment. Subpleural nodularity  in the lingula similar to the prior.     Liver: Hepatic steatosis with  morphologic changes of chronic liver  disease. Evaluation for underlying lesions limited without contrast.     Biliary tract: Cholecystectomy.     Spleen: Calcification along the lateral margin similar to the prior  likely the sequela of prior trauma or infection.     Pancreas: Within normal limits.     Adrenals: Within normal limits.     Kidneys: Stable subcentimeter hypodense lesion upper pole left kidney  may represent a hemorrhagic or proteinaceous cyst however, solid lesion  not entirely excluded. Subsequent follow-up with ultrasound or  multiphase imaging could be considered. Right renal sinus cyst.  Intrarenal vascular calcifications. No hydroureteronephrosis.     Bowel:  Duodenal diverticulum. Small bowel loops are normal in caliber  without obstruction. Moderate colonic stool burden. Scattered colonic  diverticulosis without acute diverticulitis. Redundant colon. Ensure  up-to-date with colonoscopy. Postsurgical changes ventral abdominal wall  suggesting prior hernia repair. Fat-containing superimposed  periumbilical hernia similar to the prior.     Peritoneum: Within normal limits.     Vasculature:    Extensive atherosclerosis thoracoabdominal aorta and  branch vessels with severe stenosis of the celiac, superior mesenteric,  and bilateral renal origins.     Lymph Nodes:  Scattered subcentimeter nodes.                                                            Pelvic organs: Distended urinary bladder similar to the prior.  Hysterectomy. Extensive vascular calcifications. Phleboliths.     Abdominal/Pelvic Wall: Postsurgical scarring ventral abdominal wall..     Bones: Diffuse bone demineralization with multilevel degenerative  changes. Compression fracture L5 vertebral body with sclerosis similar  in appearance to the prior MRI. Please refer to that exam for further  detail. No retropulsion..       Impression:      1.  No acute intra-abdominal or pelvic process.  2.  Distended urinary bladder extending to  just below the umbilicus.  3.  Increasing bibasilar airspace disease favoring atelectasis with  trace pleural fluid. Recommend follow-up to resolution.  4.  L5 compression fractures similar to the MRI of 03/07/2023.  5.  Please see above for additional chronic findings/recommendations.            This report was finalized on 3/9/2023 4:05 PM by Dr. Gisell Gonzalez M.D.                Assessment:       Bilateral low back pain without sciatica, unspecified chronicity    Legally blind    Essential hypertension    Paroxysmal atrial fibrillation (HCC)    Stage 4 chronic kidney disease (HCC)    Chronic pain disorder    Type 2 diabetes mellitus with hyperglycemia (HCC)    Macular degeneration    Hypothyroidism    Closed fracture of fifth lumbar vertebra (HCC)    Chronic ITP (idiopathic thrombocytopenia) (HCC)    Acute left-sided low back pain with left-sided sciatica    Urine retention      1. Urinary retention- on flomax, long h/o urinary retention, used to CIC but has been voiding spontaneously with leakage in between    2.   CKD- baseline sCr 1.6- 1.9    3. Incontinence- due to mobility status    Plan:   Place jimenes, pt to be discharged with jimenes catheter  Cont flomax  Will do voiding trial on outpatient basis, schedulers notified and will contact pt to set up appt  Hussain Cruz MD  03/10/23  13:22 EST

## 2023-03-10 NOTE — PLAN OF CARE
Goal Outcome Evaluation:              Outcome Evaluation: (P) Pt was just transfered from the [facility] to the bed by nursing with an assist of 2, but she agreed to complete bed therex. Pt reported pain in her lower back and her L hip. Pt performed BLE exercises, allow time for rest breaks and giving VC. Pt would benefit from continued therapy for strengthening and mobility.    ..Patient was not wearing a face mask during this therapy encounter. Therapist used appropriate personal protective equipment including eye protection, mask, and gloves.  Mask used was standard procedure mask. Appropriate PPE was worn during the entire therapy session. Hand hygiene was completed before and after therapy session. Patient is not in enhanced droplet precautions.

## 2023-03-10 NOTE — PROGRESS NOTES
"DAILY PROGRESS NOTE  [facility]    Patient Identification:  Name: Helena Carmen  Age: 89 y.o.  Sex: female  :  1931  MRN: [id number]         Primary Care Physician: Mariah Hickman MD    Subjective:  Interval History:She she complains of pain.  Also some difficulty with urine retention.    Objective:    Scheduled Meds:amLODIPine, 2.5 mg, Oral, Daily  budesonide-formoterol, 2 puff, Inhalation, BID - RT  carvedilol, 12.5 mg, Oral, BID  cetirizine, 10 mg, Oral, Daily  cholecalciferol, 1,000 Units, Oral, Daily  furosemide, 40 mg, Oral, Daily  gabapentin, 600 mg, Oral, Q12H  hydrALAZINE, 50 mg, Oral, BID  HYDROcodone-acetaminophen, 1 tablet, Oral, Q6H  [START ON 3/11/2023] immune globulin (human), 20 g, Intravenous, Q24H   Followed by  [START ON 3/13/2023] immune globulin (human), 20 g, Intravenous, Q24H   Followed by  [START ON 3/15/2023] immune globulin (human), 10 g, Intravenous, Q24H   Followed by  [START ON 3/17/2023] immune globulin (human), 5 g, Intravenous, Q24H  insulin glargine, 20 Units, Subcutaneous, Nightly  insulin lispro, 0-9 Units, Subcutaneous, TID AC  levothyroxine, 50 mcg, Oral, Q AM  lidocaine, 1 patch, Transdermal, Q24H  montelukast, 10 mg, Oral, Nightly  predniSONE, 10 mg, Oral, Daily With Breakfast  senna-docusate sodium, 2 tablet, Oral, Nightly  sodium chloride, 10 mL, Intravenous, Q12H  tamsulosin, 0.4 mg, Oral, Daily      Continuous Infusions:     Vital signs in last 24 hours:  Temp:  [97.2 °F (36.2 °C)-97.8 °F (36.6 °C)] 97.7 °F (36.5 °C)  Heart Rate:  [57-74] 58  Resp:  [16-18] 18  BP: (142-184)/(56-86) 184/67    Intake/Output:    Intake/Output Summary (Last 24 hours) at 3/10/2023 1245  Last data filed at 3/10/2023 0900  Gross per 24 hour   Intake 1110 ml   Output 700 ml   Net 410 ml       Exam:  BP (!) 184/67   Pulse 58   Temp 97.7 °F (36.5 °C) (Oral)   Resp 18   Ht 144.8 cm (57.01\")   Wt 75.1 kg (165 lb 9.1 oz)   SpO2 99%   BMI 35.82 kg/m²     General " Appearance:    Alert, cooperative, no distress   Head:    Normocephalic, without obvious abnormality, atraumatic   Eyes:       Throat:   Lips, tongue, gums normal   Neck:   Supple, symmetrical, trachea midline, no JVD   Lungs:     Clear to auscultation bilaterally, respirations unlabored   Chest Wall:    No tenderness or deformity    Heart:    Regular rate and rhythm, S1 and S2 normal, no murmur,no  Rub or gallop   Abdomen:     Soft, nontender, bowel sounds active, no masses, no organomegaly    Extremities:   Extremities normal, atraumatic, no cyanosis or edema   Pulses:      Skin:   Skin is warm and dry,  no rashes or palpable lesions   Neurologic:   Very weak      Lab Results (last 72 hours)     Procedure Component Value Units Date/Time    POC Glucose Once [[id number]]  (Abnormal) Collected: 03/08/23 1108    Specimen: Blood Updated: 03/08/23 1111     Glucose 167 mg/dL      Comment: Meter: [id number] : [id number] Quynh JOHNSON       CBC Auto Differential [[id number]]  (Abnormal) Collected: 03/08/23 0621    Specimen: Blood Updated: 03/08/23 0722     WBC 6.02 10*3/mm3      RBC 4.50 10*6/mm3      Hemoglobin 12.4 g/dL      Hematocrit 39.0 %      MCV 86.7 fL      MCH 27.6 pg      MCHC 31.8 g/dL      RDW 15.0 %      RDW-SD 47.1 fl      MPV 12.5 fL      Platelets 40 10*3/mm3      Neutrophil % 64.2 %      Lymphocyte % 24.4 %      Monocyte % 9.8 %      Eosinophil % 0.8 %      Basophil % 0.3 %      Neutrophils, Absolute 3.86 10*3/mm3      Lymphocytes, Absolute 1.47 10*3/mm3      Monocytes, Absolute 0.59 10*3/mm3      Eosinophils, Absolute 0.05 10*3/mm3      Basophils, Absolute 0.02 10*3/mm3     Comprehensive Metabolic Panel [[id number]]  (Abnormal) Collected: 03/08/23 0621    Specimen: Blood Updated: 03/08/23 0654     Glucose 191 mg/dL      BUN 34 mg/dL      Creatinine 1.54 mg/dL      Sodium 139 mmol/L      Potassium 4.4 mmol/L      Chloride 104 mmol/L      CO2 24.0 mmol/L      Calcium 8.1 mg/dL      Total Protein 6.0  g/dL      Albumin 3.6 g/dL      ALT (SGPT) 14 U/L      AST (SGOT) 9 U/L      Alkaline Phosphatase 51 U/L      Total Bilirubin 0.6 mg/dL      Globulin 2.4 gm/dL      A/G Ratio 1.5 g/dL      BUN/Creatinine Ratio 22.1     Anion Gap 11.0 mmol/L      eGFR 31.5 mL/min/1.73     Narrative:      GFR Normal >60  Chronic Kidney Disease <60  Kidney Failure <15    The GFR formula is only valid for adults with stable renal function between ages 18 and 70.    Hemoglobin A1c [[id number]]  (Abnormal) Collected: 03/08/23 0621    Specimen: Blood Updated: 03/08/23 0648     Hemoglobin A1C 6.20 %     Narrative:      Hemoglobin A1C Ranges:    Increased Risk for Diabetes  5.7% to 6.4%  Diabetes                     >= 6.5%  Diabetic Goal                < 7.0%    POC Glucose Once [[id number]]  (Abnormal) Collected: 03/08/23 0641    Specimen: Blood Updated: 03/08/23 0643     Glucose 176 mg/dL      Comment: Meter: [id number] : [id number] Mark Arrington       POC Glucose Once [[id number]]  (Abnormal) Collected: 03/07/23 2140    Specimen: Blood Updated: 03/07/23 2146     Glucose 180 mg/dL      Comment: Meter: [id number] : [id number] Jonathan JOHNSON       High Sensitivity Troponin T 2Hr [[id number]]  (Abnormal) Collected: 03/07/23 1441    Specimen: Blood Updated: 03/07/23 1520     HS Troponin T 67 ng/L      Troponin T Delta -10 ng/L     Narrative:      High Sensitive Troponin T Reference Range:  <10.0 ng/L- Negative Female for AMI  <15.0 ng/L- Negative Male for AMI  >=10 - Abnormal Female indicating possible myocardial injury.  >=15 - Abnormal Male indicating possible myocardial injury.   Clinicians would have to utilize clinical acumen, EKG, Troponin, and serial changes to determine if it is an Acute Myocardial Infarction or myocardial injury due to an underlying chronic condition.         High Sensitivity Troponin T [[id number]]  (Abnormal) Collected: 03/07/23 1153    Specimen: Blood Updated: 03/07/23 1324     HS Troponin T 77 ng/L      Narrative:      High Sensitive Troponin T Reference Range:  <10.0 ng/L- Negative Female for AMI  <15.0 ng/L- Negative Male for AMI  >=10 - Abnormal Female indicating possible myocardial injury.  >=15 - Abnormal Male indicating possible myocardial injury.   Clinicians would have to utilize clinical acumen, EKG, Troponin, and serial changes to determine if it is an Acute Myocardial Infarction or myocardial injury due to an underlying chronic condition.         Comprehensive Metabolic Panel [[id number]]  (Abnormal) Collected: 03/07/23 1153    Specimen: Blood Updated: 03/07/23 1312     Glucose 79 mg/dL      BUN 31 mg/dL      Creatinine 1.50 mg/dL      Sodium 137 mmol/L      Potassium 4.4 mmol/L      Chloride 102 mmol/L      CO2 25.1 mmol/L      Calcium 8.8 mg/dL      Total Protein 6.5 g/dL      Albumin 4.1 g/dL      ALT (SGPT) 19 U/L      AST (SGOT) 15 U/L      Alkaline Phosphatase 58 U/L      Total Bilirubin 0.5 mg/dL      Globulin 2.4 gm/dL      A/G Ratio 1.7 g/dL      BUN/Creatinine Ratio 20.7     Anion Gap 9.9 mmol/L      eGFR 32.6 mL/min/1.73     Narrative:      GFR Normal >60  Chronic Kidney Disease <60  Kidney Failure <15    The GFR formula is only valid for adults with stable renal function between ages 18 and 70.    Lipase [[id number]]  (Normal) Collected: 03/07/23 1153    Specimen: Blood Updated: 03/07/23 1312     Lipase 37 U/L     BNP [[id number]]  (Abnormal) Collected: 03/07/23 1153    Specimen: Blood Updated: 03/07/23 1310     proBNP 2,394.0 pg/mL     Narrative:      Among patients with dyspnea, NT-proBNP is highly sensitive for the detection of acute congestive heart failure. In addition NT-proBNP of <300 pg/ml effectively rules out acute congestive heart failure with 99% negative predictive value.    Results may be falsely decreased if patient taking Biotin.      Respiratory Panel PCR w/COVID-19(SARS-CoV-2) LATONIA/CHARLIE/ALEJANDRO/PAD/COR/MAD/PASQUALE In-House, NP Swab in UTM/VTM, 3-4 HR TAT - Swab, Nasopharynx [[id number]]   (Normal) Collected: 03/07/23 1142    Specimen: Swab from Nasopharynx Updated: 03/07/23 1251     ADENOVIRUS, PCR Not Detected     Coronavirus 229E Not Detected     Coronavirus HKU1 Not Detected     Coronavirus NL63 Not Detected     Coronavirus OC43 Not Detected     COVID19 Not Detected     Human Metapneumovirus Not Detected     Human Rhinovirus/Enterovirus Not Detected     Influenza A PCR Not Detected     Influenza B PCR Not Detected     Parainfluenza Virus 1 Not Detected     Parainfluenza Virus 2 Not Detected     Parainfluenza Virus 3 Not Detected     Parainfluenza Virus 4 Not Detected     RSV, PCR Not Detected     Bordetella pertussis pcr Not Detected     Bordetella parapertussis PCR Not Detected     Chlamydophila pneumoniae PCR Not Detected     Mycoplasma pneumo by PCR Not Detected    Narrative:      In the setting of a positive respiratory panel with a viral infection PLUS a negative procalcitonin without other underlying concern for bacterial infection, consider observing off antibiotics or discontinuation of antibiotics and continue supportive care. If the respiratory panel is positive for atypical bacterial infection (Bordetella pertussis, Chlamydophila pneumoniae, or Mycoplasma pneumoniae), consider antibiotic de-escalation to target atypical bacterial infection.    Manual Differential [[id number]]  (Abnormal) Collected: 03/07/23 1153    Specimen: Blood Updated: 03/07/23 1239     Neutrophil % 85.6 %      Lymphocyte % 9.3 %      Monocyte % 5.2 %      Neutrophils Absolute 7.86 10*3/mm3      Lymphocytes Absolute 0.85 10*3/mm3      Monocytes Absolute 0.48 10*3/mm3      RBC Morphology Normal     Smudge Cells Slight/1+     Platelet Morphology Normal    CBC & Differential [[id number]]  (Abnormal) Collected: 03/07/23 1153    Specimen: Blood Updated: 03/07/23 1239    Narrative:      The following orders were created for panel order CBC & Differential.  Procedure                               Abnormality         Status                      ---------                               -----------         ------                     CBC Auto Differential[[id number]]        Abnormal            Final result                 Please view results for these tests on the individual orders.    CBC Auto Differential [[id number]]  (Abnormal) Collected: 03/07/23 1153    Specimen: Blood Updated: 03/07/23 1239     WBC 9.18 10*3/mm3      RBC 4.59 10*6/mm3      Hemoglobin 13.2 g/dL      Hematocrit 40.8 %      MCV 88.9 fL      MCH 28.8 pg      MCHC 32.4 g/dL      RDW 15.3 %      RDW-SD 49.6 fl      MPV 12.5 fL      Platelets 41 10*3/mm3     Urinalysis With Culture If Indicated - Straight Cath [[id number]]  (Normal) Collected: 03/07/23 1138    Specimen: Urine from Straight Cath Updated: 03/07/23 1228     Color, UA Yellow     Appearance, UA Clear     pH, UA 6.5     Specific Gravity, UA 1.007     Glucose, UA Negative     Ketones, UA Negative     Bilirubin, UA Negative     Blood, UA Negative     Protein, UA Negative     Leuk Esterase, UA Negative     Nitrite, UA Negative     Urobilinogen, UA 1.0 E.U./dL    Narrative:      In absence of clinical symptoms, the presence of pyuria, bacteria, and/or nitrites on the urinalysis result does not correlate with infection.  Urine microscopic not indicated.    POC Glucose Once [[id number]]  (Normal) Collected: 03/07/23 1140    Specimen: Blood Updated: 03/07/23 1142     Glucose 84 mg/dL      Comment: Meter: [id number] : [id number] Chad JOHNSON           Data Review:  Results from last 7 days   Lab Units 03/10/23  0523 03/09/23  0525 03/08/23  0621   SODIUM mmol/L 133* 136 139   POTASSIUM mmol/L 4.3 4.3 4.4   CHLORIDE mmol/L 102 102 104   CO2 mmol/L 20.9* 23.7 24.0   BUN mg/dL 44* 42* 34*   CREATININE mg/dL 1.67* 1.73* 1.54*   GLUCOSE mg/dL 203* 220* 191*   CALCIUM mg/dL 8.0* 8.0* 8.1*     Results from last 7 days   Lab Units 03/10/23  0523 03/09/23  0525 03/08/23  1719 03/08/23  0621   WBC 10*3/mm3 4.77 6.44  --   6.02   HEMOGLOBIN g/dL 11.1* 12.4  --  12.4   HEMATOCRIT % 33.7* 38.3  --  39.0   PLATELETS 10*3/mm3 56* 41* 38* 40*         Results from last 7 days   Lab Units 03/08/23  0621   HEMOGLOBIN A1C % 6.20*     Lab Results   Lab Value Date/Time    TROPONINT 67 (C) 03/07/2023 1441    TROPONINT 77 (C) 03/07/2023 1153    TROPONINT 0.052 (C) 12/20/2022 2113    TROPONINT 0.051 (C) 12/20/2022 1350    TROPONINT 0.010 08/07/2022 1046    TROPONINT <0.010 08/01/2022 1017    TROPONINT 0.017 07/31/2022 0920    TROPONINT 0.037 (C) 07/30/2022 1034    TROPONINT 0.034 (C) 03/04/2022 0645    TROPONINT 0.024 03/02/2022 1632         Results from last 7 days   Lab Units 03/09/23  0525 03/08/23  0621 03/07/23  1153   ALK PHOS U/L 52 51 58   BILIRUBIN mg/dL 0.5 0.6 0.5   ALT (SGPT) U/L 13 14 19   AST (SGOT) U/L 12 9 15         Results from last 7 days   Lab Units 03/08/23  0621   HEMOGLOBIN A1C % 6.20*     Glucose   Date/Time Value Ref Range Status   03/10/2023 1116 289 (H) 70 - 130 mg/dL Final     Comment:     Meter: [id number] : [id number] Roberth Tilley NA   03/10/2023 0639 172 (H) 70 - 130 mg/dL Final     Comment:     Meter: [id number] : [id number] Vinnie Shaila NA   03/10/2023 0207 255 (H) 70 - 130 mg/dL Final     Comment:     Meter: [id number] : [id number] Mariposa Forman RN   03/09/2023 1955 357 (H) 70 - 130 mg/dL Final     Comment:     Meter: [id number] : [id number] Vinnie Shaila NA   03/09/2023 1631 381 (H) 70 - 130 mg/dL Final     Comment:     Meter: [id number] : [id number] Jarrod JOHNSON   03/09/2023 1104 145 (H) 70 - 130 mg/dL Final     Comment:     Meter: [id number] : [id number] Jarrod Benjipradip JOHNSON   03/09/2023 0645 179 (H) 70 - 130 mg/dL Final     Comment:     Meter: [id number] : [id number] Ana Guzman RN   03/08/2023 2047 292 (H) 70 - 130 mg/dL Final     Comment:     Meter: [id number] : [id number] Mark Arrington           Past Medical History:   Diagnosis Date   • Aortic valve stenosis     s/p  tissue AVR   • Back pain    • CKD (chronic kidney disease)    • Colitis due to Clostridioides difficile 01/26/2022   • Diastolic dysfunction     Grade 2 per echocardiogram 2013   • Diverticulosis    • Exertional shortness of breath    • Heart disease    • Hiatal hernia    • Hyperlipidemia    • Hypertension    • Hyperthyroidism    • Hypertriglyceridemia 05/31/2018   • Hypothyroidism    • L5 compression fracture (HCC) 08/2022   • Left ventricular hypertrophy    • Legally blind    • Liver disease    • Macular degeneration    • Mitral regurgitation    • Osteoarthritis of hip    • Osteoporosis    • Pancreatitis 01/26/2022   • Paroxysmal atrial fibrillation (HCC)    • Premature ventricular contractions    • Pulmonary hypertension (HCC)    • Renal insufficiency syndrome    • Type 2 diabetes mellitus (HCC)    • Uterine cancer (HCC)        Assessment:  Active Hospital Problems    Diagnosis  POA   • **Bilateral low back pain without sciatica, unspecified chronicity [M54.50]  Yes   • Acute left-sided low back pain with left-sided sciatica [M54.42]  Unknown   • Chronic ITP (idiopathic thrombocytopenia) (HCC) [D69.3]  Yes   • Closed fracture of fifth lumbar vertebra (HCC) [S32.059A]  Yes   • Hypothyroidism [E03.9]  Yes   • Chronic pain disorder [G89.4]  Yes   • Macular degeneration [H35.30]  Yes   • Type 2 diabetes mellitus with hyperglycemia (HCC) [E11.65]  Yes   • Paroxysmal atrial fibrillation (HCC) [I48.0]  Yes   • Essential hypertension [I10]  Yes   • Legally blind [H54.8]  Yes   • Stage 4 chronic kidney disease (HCC) [N18.4]  Yes      Resolved Hospital Problems   No resolved problems to display.       Plan:  Neurosurgery and hematology consults noted.   Follow lab.  Pain control.??  IVIG. CT abdomen report noted and RX for constipation.  We will ask for urology consult for urine retention issues.    Juan Lennon MD  3/10/2023  12:45 EST

## 2023-03-10 NOTE — CASE MANAGEMENT/SOCIAL WORK
Continued Stay Note  [facility]     Patient Name: Helena Carmen  MRN: [id number]  Today's Date: 3/10/2023    Admit Date: 3/7/2023    Plan: SNF at [place]   Discharge Plan     Row Name 03/10/23 1159       Plan    Plan SNF at [place]    Patient/Family in Agreement with Plan yes    Plan Comments Spoke with CN of Pain management Jennifer, she stated it would be unlikely for pt to get spinal injection over the weekend. Will plan for Monday to go to rehab. Spoke with Gina/[facility] and she is okay with that, should have bed Monday at [place]. -Ngoc GARAY               Discharge Codes    No documentation.               Expected Discharge Date and Time     Expected Discharge Date Expected Discharge Time    Mar 10, 2023             Ngoc Frost RN

## 2023-03-11 LAB
ANION GAP SERPL CALCULATED.3IONS-SCNC: 8.6 MMOL/L (ref 5–15)
BASOPHILS # BLD AUTO: 0.02 10*3/MM3 (ref 0–0.2)
BASOPHILS NFR BLD AUTO: 0.4 % (ref 0–1.5)
BUN SERPL-MCNC: 41 MG/DL (ref 8–23)
BUN/CREAT SERPL: 30.1 (ref 7–25)
CALCIUM SPEC-SCNC: 8.3 MG/DL (ref 8.2–9.6)
CHLORIDE SERPL-SCNC: 104 MMOL/L (ref 98–107)
CO2 SERPL-SCNC: 21.4 MMOL/L (ref 22–29)
CREAT SERPL-MCNC: 1.36 MG/DL (ref 0.57–1)
DEPRECATED RDW RBC AUTO: 46.5 FL (ref 37–54)
EGFRCR SERPLBLD CKD-EPI 2021: 36.6 ML/MIN/1.73
EOSINOPHIL # BLD AUTO: 0.06 10*3/MM3 (ref 0–0.4)
EOSINOPHIL NFR BLD AUTO: 1.3 % (ref 0.3–6.2)
ERYTHROCYTE [DISTWIDTH] IN BLOOD BY AUTOMATED COUNT: 15.2 % (ref 12.3–15.4)
GLUCOSE BLDC GLUCOMTR-MCNC: 197 MG/DL (ref 70–130)
GLUCOSE BLDC GLUCOMTR-MCNC: 215 MG/DL (ref 70–130)
GLUCOSE BLDC GLUCOMTR-MCNC: 319 MG/DL (ref 70–130)
GLUCOSE BLDC GLUCOMTR-MCNC: 386 MG/DL (ref 70–130)
GLUCOSE BLDC GLUCOMTR-MCNC: 401 MG/DL (ref 70–130)
GLUCOSE SERPL-MCNC: 170 MG/DL (ref 65–99)
HCT VFR BLD AUTO: 32.2 % (ref 34–46.6)
HGB BLD-MCNC: 10.9 G/DL (ref 12–15.9)
LYMPHOCYTES # BLD AUTO: 0.68 10*3/MM3 (ref 0.7–3.1)
LYMPHOCYTES NFR BLD AUTO: 14.8 % (ref 19.6–45.3)
MCH RBC QN AUTO: 28.6 PG (ref 26.6–33)
MCHC RBC AUTO-ENTMCNC: 33.9 G/DL (ref 31.5–35.7)
MCV RBC AUTO: 84.5 FL (ref 79–97)
MONOCYTES # BLD AUTO: 0.47 10*3/MM3 (ref 0.1–0.9)
MONOCYTES NFR BLD AUTO: 10.3 % (ref 5–12)
NEUTROPHILS NFR BLD AUTO: 3.32 10*3/MM3 (ref 1.7–7)
NEUTROPHILS NFR BLD AUTO: 72.5 % (ref 42.7–76)
PLATELET # BLD AUTO: 77 10*3/MM3 (ref 140–450)
PMV BLD AUTO: 10.8 FL (ref 6–12)
POTASSIUM SERPL-SCNC: 4.3 MMOL/L (ref 3.5–5.2)
RBC # BLD AUTO: 3.81 10*6/MM3 (ref 3.77–5.28)
SODIUM SERPL-SCNC: 134 MMOL/L (ref 136–145)
WBC NRBC COR # BLD: 4.58 10*3/MM3 (ref 3.4–10.8)

## 2023-03-11 PROCEDURE — 94799 UNLISTED PULMONARY SVC/PX: CPT

## 2023-03-11 PROCEDURE — 80048 BASIC METABOLIC PNL TOTAL CA: CPT | Performed by: HOSPITALIST

## 2023-03-11 PROCEDURE — 82962 GLUCOSE BLOOD TEST: CPT

## 2023-03-11 PROCEDURE — 85025 COMPLETE CBC W/AUTO DIFF WBC: CPT | Performed by: HOSPITALIST

## 2023-03-11 PROCEDURE — 97530 THERAPEUTIC ACTIVITIES: CPT | Performed by: PHYSICAL THERAPIST

## 2023-03-11 PROCEDURE — 63710000001 PREDNISONE PER 5 MG: Performed by: INTERNAL MEDICINE

## 2023-03-11 PROCEDURE — 94761 N-INVAS EAR/PLS OXIMETRY MLT: CPT

## 2023-03-11 PROCEDURE — 25010000002 IMMUNE GLOBULIN (HUMAN) 20 GM/200ML SOLUTION: Performed by: INTERNAL MEDICINE

## 2023-03-11 PROCEDURE — 25010000002 IMMUNE GLOBULIN (HUMAN) 5 GM/50ML SOLUTION: Performed by: INTERNAL MEDICINE

## 2023-03-11 PROCEDURE — 99232 SBSQ HOSP IP/OBS MODERATE 35: CPT | Performed by: INTERNAL MEDICINE

## 2023-03-11 PROCEDURE — 63710000001 DIPHENHYDRAMINE PER 50 MG: Performed by: INTERNAL MEDICINE

## 2023-03-11 PROCEDURE — 63710000001 INSULIN LISPRO (HUMAN) PER 5 UNITS: Performed by: INTERNAL MEDICINE

## 2023-03-11 RX ORDER — FAMOTIDINE 10 MG/ML
20 INJECTION, SOLUTION INTRAVENOUS ONCE AS NEEDED
Status: DISCONTINUED | OUTPATIENT
Start: 2023-03-11 | End: 2023-03-14 | Stop reason: HOSPADM

## 2023-03-11 RX ORDER — DIPHENHYDRAMINE HCL 25 MG
25 CAPSULE ORAL ONCE
Status: COMPLETED | OUTPATIENT
Start: 2023-03-11 | End: 2023-03-11

## 2023-03-11 RX ORDER — DIPHENHYDRAMINE HYDROCHLORIDE 50 MG/ML
50 INJECTION INTRAMUSCULAR; INTRAVENOUS ONCE AS NEEDED
Status: DISCONTINUED | OUTPATIENT
Start: 2023-03-11 | End: 2023-03-14 | Stop reason: HOSPADM

## 2023-03-11 RX ORDER — ACETAMINOPHEN 325 MG/1
650 TABLET ORAL ONCE
Status: COMPLETED | OUTPATIENT
Start: 2023-03-11 | End: 2023-03-11

## 2023-03-11 RX ADMIN — CARVEDILOL 12.5 MG: 12.5 TABLET, FILM COATED ORAL at 20:03

## 2023-03-11 RX ADMIN — CETIRIZINE HYDROCHLORIDE 10 MG: 10 TABLET ORAL at 08:27

## 2023-03-11 RX ADMIN — HYDROCODONE BITARTRATE AND ACETAMINOPHEN 1 TABLET: 7.5; 325 TABLET ORAL at 02:15

## 2023-03-11 RX ADMIN — FUROSEMIDE 40 MG: 40 TABLET ORAL at 08:27

## 2023-03-11 RX ADMIN — LORAZEPAM 0.5 MG: 0.5 TABLET ORAL at 20:52

## 2023-03-11 RX ADMIN — Medication 10 ML: at 20:02

## 2023-03-11 RX ADMIN — IMMUNE GLOBULIN (HUMAN) 5 G: 10 INJECTION INTRAVENOUS; SUBCUTANEOUS at 14:20

## 2023-03-11 RX ADMIN — TAMSULOSIN HYDROCHLORIDE 0.4 MG: 0.4 CAPSULE ORAL at 08:28

## 2023-03-11 RX ADMIN — HYDROCODONE BITARTRATE AND ACETAMINOPHEN 1 TABLET: 7.5; 325 TABLET ORAL at 20:03

## 2023-03-11 RX ADMIN — HYDRALAZINE HYDROCHLORIDE 50 MG: 50 TABLET ORAL at 08:27

## 2023-03-11 RX ADMIN — DIPHENHYDRAMINE HYDROCHLORIDE 25 MG: 25 CAPSULE ORAL at 14:21

## 2023-03-11 RX ADMIN — INSULIN GLARGINE-YFGN 20 UNITS: 100 INJECTION, SOLUTION SUBCUTANEOUS at 20:03

## 2023-03-11 RX ADMIN — GABAPENTIN 600 MG: 300 CAPSULE ORAL at 08:27

## 2023-03-11 RX ADMIN — PREDNISONE 10 MG: 10 TABLET ORAL at 08:28

## 2023-03-11 RX ADMIN — MONTELUKAST SODIUM 10 MG: 10 TABLET, FILM COATED ORAL at 20:02

## 2023-03-11 RX ADMIN — HYDRALAZINE HYDROCHLORIDE 50 MG: 50 TABLET ORAL at 20:03

## 2023-03-11 RX ADMIN — AMLODIPINE BESYLATE 2.5 MG: 2.5 TABLET ORAL at 08:27

## 2023-03-11 RX ADMIN — BUDESONIDE AND FORMOTEROL FUMARATE DIHYDRATE 2 PUFF: 160; 4.5 AEROSOL RESPIRATORY (INHALATION) at 11:44

## 2023-03-11 RX ADMIN — BUDESONIDE AND FORMOTEROL FUMARATE DIHYDRATE 2 PUFF: 160; 4.5 AEROSOL RESPIRATORY (INHALATION) at 21:19

## 2023-03-11 RX ADMIN — INSULIN LISPRO 2 UNITS: 100 INJECTION, SOLUTION INTRAVENOUS; SUBCUTANEOUS at 08:27

## 2023-03-11 RX ADMIN — IMMUNE GLOBULIN (HUMAN) 20 G: 10 INJECTION INTRAVENOUS; SUBCUTANEOUS at 14:20

## 2023-03-11 RX ADMIN — Medication 1000 UNITS: at 08:28

## 2023-03-11 RX ADMIN — HYDROCODONE BITARTRATE AND ACETAMINOPHEN 1 TABLET: 7.5; 325 TABLET ORAL at 14:21

## 2023-03-11 RX ADMIN — LEVOTHYROXINE SODIUM 50 MCG: 0.05 TABLET ORAL at 06:23

## 2023-03-11 RX ADMIN — INSULIN LISPRO 7 UNITS: 100 INJECTION, SOLUTION INTRAVENOUS; SUBCUTANEOUS at 18:05

## 2023-03-11 RX ADMIN — CARVEDILOL 12.5 MG: 12.5 TABLET, FILM COATED ORAL at 08:28

## 2023-03-11 RX ADMIN — Medication 10 ML: at 08:28

## 2023-03-11 RX ADMIN — GABAPENTIN 600 MG: 300 CAPSULE ORAL at 20:03

## 2023-03-11 RX ADMIN — IMMUNE GLOBULIN (HUMAN) 20 G: 10 INJECTION INTRAVENOUS; SUBCUTANEOUS at 11:18

## 2023-03-11 RX ADMIN — DOCUSATE SODIUM 50MG AND SENNOSIDES 8.6MG 2 TABLET: 8.6; 5 TABLET, FILM COATED ORAL at 20:03

## 2023-03-11 RX ADMIN — HYDROCODONE BITARTRATE AND ACETAMINOPHEN 1 TABLET: 7.5; 325 TABLET ORAL at 06:23

## 2023-03-11 RX ADMIN — INSULIN LISPRO 4 UNITS: 100 INJECTION, SOLUTION INTRAVENOUS; SUBCUTANEOUS at 11:42

## 2023-03-11 RX ADMIN — ACETAMINOPHEN 650 MG: 325 TABLET, FILM COATED ORAL at 14:21

## 2023-03-11 RX ADMIN — LIDOCAINE 1 PATCH: 50 PATCH CUTANEOUS at 20:04

## 2023-03-11 NOTE — THERAPY TREATMENT NOTE
Patient Name: Helena Carmen  : 1931    MRN: [id number]                              Today's Date: 3/11/2023       Admit Date: 3/7/2023    Visit Dx:     ICD-10-CM ICD-9-CM   1. Acute on chronic bilateral low back pain  M54.50 724.2   2. Unable to walk  R26.2 719.7   3. Generalized weakness  R53.1 780.79   4. Elevated troponin  R77.8 790.6   5. Thrombocytopenia (HCC)  D69.6 287.5   6. History of ITP  Z86.2 V12.3   7. Chronic kidney disease, unspecified CKD stage  N18.9 585.9   8. Closed compression fracture of L5 lumbar vertebra, with delayed healing, subsequent encounter  S32.050G V54.17     Patient Active Problem List   Diagnosis   • Aortic valve stenosis   • Fatigue   • MI (mitral incompetence)   • PVC (premature ventricular contraction)   • Legally blind   • Essential hypertension   • Hypertriglyceridemia   • Pulmonary hypertension (HCC)   • Paroxysmal atrial fibrillation (HCC)   • Anxiety   • Stage 4 chronic kidney disease (HCC)   • Chronic pain disorder   • Degeneration of lumbar intervertebral disc   • Type 2 diabetes mellitus with hyperglycemia (HCC)   • Esophageal reflux   • Gastroparesis   • Hiatal hernia   • Iatrogenic hypothyroidism   • Macular degeneration   • Malignant neoplasm of uterus (HCC)   • Osteoarthritis of knee   • Peripheral nerve disease   • Secondary hyperparathyroidism (HCC)   • Thrombocytopenia (HCC)   • Chronic heart failure with preserved ejection fraction (HCC)   • Other cirrhosis of liver (HCC)   • Hypothyroidism   • Nonrheumatic tricuspid valve regurgitation   • Anemia, chronic disease   • Closed fracture of fifth lumbar vertebra (HCC)   • Closed fracture of fifth lumbar vertebra, unspecified fracture morphology, initial encounter ([facility])   • Diabetic polyneuropathy associated with type 2 diabetes mellitus (HCC)   • Chronic ITP (idiopathic thrombocytopenia) ([facility])   • Chronic midline low back pain with bilateral sciatica   • Acute deep vein thrombosis of calf, bilateral (HCC)   •  Bilateral low back pain without sciatica, unspecified chronicity   • Acute left-sided low back pain with left-sided sciatica   • Urine retention     Past Medical History:   Diagnosis Date   • Aortic valve stenosis     s/p tissue AVR   • Back pain    • CKD (chronic kidney disease)    • Colitis due to Clostridioides difficile 01/26/2022   • Diastolic dysfunction     Grade 2 per echocardiogram 2013   • Diverticulosis    • Exertional shortness of breath    • Heart disease    • Hiatal hernia    • Hyperlipidemia    • Hypertension    • Hyperthyroidism    • Hypertriglyceridemia 05/31/2018   • Hypothyroidism    • L5 compression fracture (HCC) 08/2022   • Left ventricular hypertrophy    • Legally blind    • Liver disease    • Macular degeneration    • Mitral regurgitation    • Osteoarthritis of hip    • Osteoporosis    • Pancreatitis 01/26/2022   • Paroxysmal atrial fibrillation (HCC)    • Premature ventricular contractions    • Pulmonary hypertension (HCC)    • Renal insufficiency syndrome    • Type 2 diabetes mellitus (HCC)    • Uterine cancer (HCC)      Past Surgical History:   Procedure Laterality Date   • AORTIC VALVE REPAIR/REPLACEMENT     • CATARACT EXTRACTION      1970, 1999   • CHOLECYSTECTOMY     • ENDOSCOPY  08/15/2014    no gross lesions in stomach/duodenum, erythrematous mucosa in stomach   • HYSTERECTOMY  2007   • STERNOTOMY        General Information     Row Name 03/11/23 1038          Physical Therapy Time and Intention    Document Type therapy note (daily note)  -EL     Mode of Treatment individual therapy;physical therapy  -     Row Name 03/11/23 1038          General Information    Patient Profile Reviewed yes  -EL     Existing Precautions/Restrictions fall  -EL     Row Name 03/11/23 1038          Cognition    Orientation Status (Cognition) oriented x 3  -EL     Row Name 03/11/23 1038          Safety Issues, Functional Mobility    Impairments Affecting Function (Mobility) endurance/activity  tolerance;strength;balance;pain;postural/trunk control  -EL           User Key  (r) = Recorded By, (t) = Taken By, (c) = Cosigned By    Initials Name Provider Type    Teresa Bhatia PT Physical Therapist               Mobility     Row Name 03/11/23 1039          Bed Mobility    Bed Mobility supine-sit  -EL     Supine-Sit Se[place] (Bed Mobility) contact guard;minimum assist (75% patient effort)  -EL     Sit-Supine [place] (Bed Mobility) not tested  -EL     Assistive Device (Bed Mobility) bed rails;head of bed elevated  -     Row Name 03/11/23 1039          Bed-Chair Transfer    Bed-Chair Seymour (Transfers) contact guard  -EL     Assistive Device (Bed-Chair Transfers) walker, front-wheeled  -EL     Comment, (Bed-Chair Transfer) Bed to bed side commode  -     Row Name 03/11/23 1039          Sit-Stand Transfer    Sit-Stand Seymour (Transfers) contact guard  -EL     Assistive Device (Sit-Stand Transfers) walker, front-wheeled  -EL     Row Name 03/11/23 1039          Gait/Stairs (Locomotion)    [place]our Level (Gait) contact guard  -EL     Assistive Device (Gait) walker, front-wheeled  -EL     Distance in Feet (Gait) 6  -EL     Deviations/Abnormal Patterns (Gait) bilateral deviations;antalgic;stride length decreased;gait speed decreased;paul decreased;weight shifting decreased  -EL     Bilateral Gait Deviations forward flexed posture  -EL           User Key  (r) = Recorded By, (t) = Taken By, (c) = Cosigned By    Initials Name Provider Type    Teresa Bhatia PT Physical Therapist               Obj/Interventions     Row Name 03/11/23 1040          Range of Motion Comprehensive    General Range of Motion bilateral lower extremity ROM WNL  -EL     Row Name 03/11/23 1040          Strength Comprehensive (MMT)    General Manual Muscle Testing (MMT) Assessment lower extremity strength deficits identified  -EL     Comment, General Manual Muscle Testing (MMT) Assessment BLEs 3+/5  -EL      Row Name 03/11/23 1040          Motor Skills    Motor Skills functional endurance  -EL     Functional Endurance poor  -EL     Row Name 03/11/23 1040          Balance    Balance Assessment sitting static balance;sitting dynamic balance;standing static balance;standing dynamic balance  -EL     Static Sitting Balance supervision  -EL     Dynamic Sitting Balance supervision  -EL     Position, Sitting Balance sitting edge of bed  -EL     Static Standing Balance contact guard  -EL     Dynamic Standing Balance contact guard  -EL     Position/Device Used, Standing Balance walker, rolling  -EL           User Key  (r) = Recorded By, (t) = Taken By, (c) = Cosigned By    Initials Name Provider Type    Teresa Bhatia, PT Physical Therapist               Goals/Plan    No documentation.                Clinical Impression     Row Name 03/11/23 1040          Pain    Pretreatment Pain Rating 6/10  -EL     Posttreatment Pain Rating 6/10  -EL     Pain Location - Side/Orientation Bilateral;Left;other (see comments)  Left LE, B Low back  -EL     Pain Location - back  -EL     Pain Intervention(s) Repositioned  -EL     Row Name 03/11/23 1040          Plan of Care Review    Plan of Care Reviewed With patient;daughter  -EL     Progress improving  -EL     Outcome Evaluation The patient was seen this AM for PT treatment session. Patient requiring intermittent bouts of contact guard-min assist for bed mobility. She is able to ambulate 6 ft with rw and CGA and then transfer to [facility] with rw and CGA. Patient exhibiting significant forward flexed posture but stating this is her normal posture. Overall, she requires less time to complete tasks compared to initial evaluation. She continues to require additional skilled PT to address limitations in strength, endurance, and balance affecting functional mobility.  -EL     Row Name 03/11/23 1040          Therapy Assessment/Plan (PT)    Rehab Potential (PT) fair, will monitor progress closely  -EL      Criteria for Skilled Interventions Met (PT) yes;meets criteria;skilled treatment is necessary  -EL     Therapy Frequency (PT) 5 times/wk  -EL     Row Name 03/11/23 1040          Vital Signs    Pre SpO2 (%) 100  -EL     O2 Delivery Pre Treatment supplemental O2  -EL     Post SpO2 (%) 96  -EL     O2 Delivery Post Treatment room air  -     Row Name 03/11/23 1040          Positioning and Restraints    Pre-Treatment Position in bed  -EL     Post Treatment Position bsc  -EL     On BS commode notified nsg;call light within reach;encouraged to call for assist;with family/caregiver  -EL           User Key  (r) = Recorded By, (t) = Taken By, (c) = Cosigned By    Initials Name Provider Type    Teresa Bhatia PT Physical Therapist               Outcome Measures     Row Name 03/11/23 1044          How much help from another person do you currently need...    Turning from your back to your side while in flat bed without using bedrails? 3  -EL     Moving from lying on back to sitting on the side of a flat bed without bedrails? 2  -EL     Moving to and from a bed to a chair (including a wheelchair)? 3  -EL     Standing up from a chair using your arms (e.g., wheelchair, bedside chair)? 3  -EL     Climbing 3-5 steps with a railing? 1  -EL     To walk in hospital room? 2  -EL     AM-PAC 6 Clicks Score (PT) 14  -EL     Highest level of mobility 4 --> Transferred to chair/commode  -     Row Name 03/11/23 1044          Functional Assessment    Outcome Measure Options AM-PAC 6 Clicks Basic Mobility (PT)  -EL           User Key  (r) = Recorded By, (t) = Taken By, (c) = Cosigned By    Initials Name Provider Type    Teresa Bhatia PT Physical Therapist                             Physical Therapy Education     Title: PT OT SLP Therapies (In Progress)     Topic: Physical Therapy (In Progress)     Point: Mobility training (Done)     Learning Progress Summary           Patient AcceptanceLEESA VU by LAURIE at 3/11/2023 1044     Acceptance, E, VU by EL at 3/8/2023 1030                   Point: Home exercise program (Not Started)     Learner Progress:  Not documented in this visit.          Point: Body mechanics (Done)     Learning Progress Summary           Patient Acceptance, E, VU by EL at 3/11/2023 1044    Acceptance, E, VU by EL at 3/8/2023 1030                   Point: Precautions (Not Started)     Learner Progress:  Not documented in this visit.                      User Key     Initials Effective Dates Name Provider Type [facility] 11/16/21 -  Teresa Hoyos PT Physical Therapist PT              PT Recommendation and Plan     Plan of Care Reviewed With: patient, daughter  Progress: improving  Outcome Evaluation: The patient was seen this AM for PT treatment session. Patient requiring intermittent bouts of contact guard-min assist for bed mobility. She is able to ambulate 6 ft with rw and CGA and then transfer to [facility] with rw and CGA. Patient exhibiting significant forward flexed posture but stating this is her normal posture. Overall, she requires less time to complete tasks compared to initial evaluation. She continues to require additional skilled PT to address limitations in strength, endurance, and balance affecting functional mobility.     Time Calculation:    PT Charges     Row Name 03/11/23 1045             Time Calculation    Start Time 1024  -EL      Stop Time 1036  -EL      Time Calculation (min) 12 min  -EL      PT Received On 03/11/23  -EL      PT - Next Appointment 03/13/23  -EL      PT Goal Re-Cert Due Date 03/15/23  -EL         Time Calculation- PT    Total Timed Code Minutes- PT 12 minute(s)  -EL            User Key  (r) = Recorded By, (t) = Taken By, (c) = Cosigned By    Initials Name Provider Type    Teresa Bhatia PT Physical Therapist              Therapy Charges for Today     Code Description Service Date Service Provider Modifiers Qty    [id number]  PT THERAPEUTIC ACT EA 15 MIN 3/11/2023 Soha  Teresa, PT GP 1          PT G-Codes  Outcome Measure Options: AM-PAC 6 Clicks Basic Mobility (PT)  AM-PAC 6 Clicks Score (PT): 14  AM-PAC 6 Clicks Score (OT): 16  Modified [place] Scale: 4 - Moderately severe disability.  Unable to walk without assistance, and unable to attend to own bodily needs without assistance.  PT Discharge Summary  Anticipated Discharge Disposition (PT): skilled nursing facility    Teresa Hoyos, CANDY  3/11/2023

## 2023-03-11 NOTE — PROGRESS NOTES
REASON FOR FOLLOWUP/CHIEF COMPLAINT:   ITP     HISTORY OF PRESENT ILLNESS:   She continues to have back apin.   No other new complaints at this time  LESI by JUAN LUIS once platelets greater than 100,000.  Platelets have improved to 77,000    Past Medical History, Past Surgical History, Social History, Family History have been reviewed and are without significant changes except as mentioned.    Review of Systems   Review of Systems   Review of systems as mentioned in the HPI otherwise negative    Medications:  The current medication list was reviewed in the EMR    ALLERGIES:    Allergies   Allergen Reactions   • Erythromycin Unknown (See Comments) and Other (See Comments)     Pt states she does not remember but it was many years ago  Pt states she does not remember but it was many years ago   • Statins Myalgia   • Cephalexin Other (See Comments) and Unknown (See Comments)     June 2022 Pt has tolerated ceftriaxone and cefepime during admission.   Pt states she does not remember reaction but it was many years ago   • Penicillins Rash   • Sulfa Antibiotics Itching and Rash              Vitals:    03/10/23 1948 03/10/23 2325 03/11/23 0722 03/11/23 1058   BP:  156/59 156/64 164/50   BP Location:  Right arm Left arm    Patient Position:  Lying Lying    Pulse: 57 59  59   Resp:  16 16    Temp:  97.7 °F (36.5 °C) 97.9 °F (36.6 °C) 98 °F (36.7 °C)   TempSrc:  Oral Oral    SpO2: 99% 98%     Weight:       Height:         Physical Exam    CONSTITUTIONAL:  Vital signs reviewed.  No distress, looks comfortable.  EYES:  Conjunctivae and lids unremarkable.  PERRLA  EARS,NOSE,MOUTH,THROAT:  Ears and nose appear unremarkable.  Lips, teeth, gums appear unremarkable.  RESPIRATORY:  Normal respiratory effort.  Lungs clear to auscultation bilaterally.  CARDIOVASCULAR:  Normal S1, S2.  No murmurs rubs or gallops.  No significant lower extremity edema.  GASTROINTESTINAL: Abdomen appears unremarkable.  Nontender.  No hepatomegaly.  No  splenomegaly.  SKIN:  Warm.  No rashes.  PSYCHIATRIC:  Normal judgment and insight.  Normal mood and affect.     I have reexamined the patient and the results are consistent with the previously documented exam. Alisa Mendoza MD     RECENT LABS:  WBC   Date Value Ref Range Status   03/11/2023 4.58 3.40 - 10.80 10*3/mm3 Final   03/10/2023 4.77 3.40 - 10.80 10*3/mm3 Final   03/09/2023 6.44 3.40 - 10.80 10*3/mm3 Final     Hemoglobin   Date Value Ref Range Status   03/11/2023 10.9 (L) 12.0 - 15.9 g/dL Final   03/10/2023 11.1 (L) 12.0 - 15.9 g/dL Final   03/09/2023 12.4 12.0 - 15.9 g/dL Final     Platelets   Date Value Ref Range Status   03/11/2023 77 (L) 140 - 450 10*3/mm3 Final   03/10/2023 56 (L) 140 - 450 10*3/mm3 Final   03/09/2023 41 (C) 140 - 450 10*3/mm3 Final   03/08/2023 38 (C) 140 - 450 10*3/mm3 Final       ASSESSMENT/PLAN:  Helena Carmen N630/1     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  • Currently on prednisone 10 mg daily.  • Platelet count is around baseline at 41,000  • Due to ongoing back pain an epidural injection is being considered.  • Anesthesiology would require platelet count to be greater than 100,000.    • IPF greater than 10  • IVIG initially entered at a dose of 400 mg/kg/day, discussed with pharmacy to dose adjust and today we will give a total dose of 1 g/kg   • Platelet count improved at 77,000  • Goal is 100     *Back pain and lower extremity pain  • Secondary to degenerative changes in the lumbar spine.  • Considering epidural injection.  • Require platelet count to be 100,000.  • Continue IVIG and platelets not at goal, could consider 1 unit of platelets on the day of procedure if necessary     *Acute on chronic diastolic congestive heart failure, -management per primary     Recommendations  • IVIG today , discussed with pharmacy and dose has been adjusted to increase it to 1 g/kg  • Daily CBC

## 2023-03-11 NOTE — PLAN OF CARE
Goal Outcome Evaluation:           Progress: improving  Outcome Evaluation: vss, patient received IVIG today, medicated with scheduled pain medication, no signs of distress, up with assist X2 to [facility] commode

## 2023-03-11 NOTE — PROGRESS NOTES
"DAILY PROGRESS NOTE  [facility]    Patient Identification:  Name: Helena Carmen  Age: 89 y.o.  Sex: female  :  1931  MRN: [id number]         Primary Care Physician: Mariah Hickman MD    Subjective:  Interval History:She she complains of pain.    Objective:    Scheduled Meds:amLODIPine, 2.5 mg, Oral, Daily  budesonide-formoterol, 2 puff, Inhalation, BID - RT  carvedilol, 12.5 mg, Oral, BID  cetirizine, 10 mg, Oral, Daily  cholecalciferol, 1,000 Units, Oral, Daily  furosemide, 40 mg, Oral, Daily  gabapentin, 600 mg, Oral, Q12H  hydrALAZINE, 50 mg, Oral, BID  HYDROcodone-acetaminophen, 1 tablet, Oral, Q6H  insulin glargine, 20 Units, Subcutaneous, Nightly  insulin lispro, 0-9 Units, Subcutaneous, TID AC  levothyroxine, 50 mcg, Oral, Q AM  lidocaine, 1 patch, Transdermal, Q24H  montelukast, 10 mg, Oral, Nightly  predniSONE, 10 mg, Oral, Daily With Breakfast  senna-docusate sodium, 2 tablet, Oral, Nightly  sodium chloride, 10 mL, Intravenous, Q12H  tamsulosin, 0.4 mg, Oral, Daily      Continuous Infusions:     Vital signs in last 24 hours:  Temp:  [97.7 °F (36.5 °C)-98.3 °F (36.8 °C)] 98 °F (36.7 °C)  Heart Rate:  [52-60] 60  Resp:  [16] 16  BP: (132-173)/(49-69) 173/63    Intake/Output:    Intake/Output Summary (Last 24 hours) at 3/11/2023 1436  Last data filed at 3/11/2023 1312  Gross per 24 hour   Intake 360 ml   Output 3550 ml   Net -3190 ml       Exam:  /63 (BP Location: Left arm, Patient Position: Lying)   Pulse 60   Temp 98 °F (36.7 °C) (Oral)   Resp 16   Ht 144.8 cm (57.01\")   Wt 75.1 kg (165 lb 9.1 oz)   SpO2 94%   BMI 35.82 kg/m²     General Appearance:    Alert, cooperative, no distress   Head:    Normocephalic, without obvious abnormality, atraumatic   Eyes:       Throat:   Lips, tongue, gums normal   Neck:   Supple, symmetrical, trachea midline, no JVD   Lungs:     Clear to auscultation bilaterally, respirations unlabored   Chest Wall:    No tenderness or " deformity    Heart:    Regular rate and rhythm, S1 and S2 normal, no murmur,no  Rub or gallop   Abdomen:     Soft, nontender, bowel sounds active, no masses, no organomegaly    Extremities:   Extremities normal, atraumatic, no cyanosis or edema   Pulses:      Skin:   Skin is warm and dry,  no rashes or palpable lesions   Neurologic:   Very weak      Lab Results (last 72 hours)     Procedure Component Value Units Date/Time    POC Glucose Once [[id number]]  (Abnormal) Collected: 03/08/23 1108    Specimen: Blood Updated: 03/08/23 1111     Glucose 167 mg/dL      Comment: Meter: [id number] : [id number] Quynh JOHNSON       CBC Auto Differential [[id number]]  (Abnormal) Collected: 03/08/23 0621    Specimen: Blood Updated: 03/08/23 0722     WBC 6.02 10*3/mm3      RBC 4.50 10*6/mm3      Hemoglobin 12.4 g/dL      Hematocrit 39.0 %      MCV 86.7 fL      MCH 27.6 pg      MCHC 31.8 g/dL      RDW 15.0 %      RDW-SD 47.1 fl      MPV 12.5 fL      Platelets 40 10*3/mm3      Neutrophil % 64.2 %      Lymphocyte % 24.4 %      Monocyte % 9.8 %      Eosinophil % 0.8 %      Basophil % 0.3 %      Neutrophils, Absolute 3.86 10*3/mm3      Lymphocytes, Absolute 1.47 10*3/mm3      Monocytes, Absolute 0.59 10*3/mm3      Eosinophils, Absolute 0.05 10*3/mm3      Basophils, Absolute 0.02 10*3/mm3     Comprehensive Metabolic Panel [[id number]]  (Abnormal) Collected: 03/08/23 0621    Specimen: Blood Updated: 03/08/23 0654     Glucose 191 mg/dL      BUN 34 mg/dL      Creatinine 1.54 mg/dL      Sodium 139 mmol/L      Potassium 4.4 mmol/L      Chloride 104 mmol/L      CO2 24.0 mmol/L      Calcium 8.1 mg/dL      Total Protein 6.0 g/dL      Albumin 3.6 g/dL      ALT (SGPT) 14 U/L      AST (SGOT) 9 U/L      Alkaline Phosphatase 51 U/L      Total Bilirubin 0.6 mg/dL      Globulin 2.4 gm/dL      A/G Ratio 1.5 g/dL      BUN/Creatinine Ratio 22.1     Anion Gap 11.0 mmol/L      eGFR 31.5 mL/min/1.73     Narrative:      GFR Normal >60  Chronic Kidney  Disease <60  Kidney Failure <15    The GFR formula is only valid for adults with stable renal function between ages 18 and 70.    Hemoglobin A1c [[id number]]  (Abnormal) Collected: 03/08/23 0621    Specimen: Blood Updated: 03/08/23 0648     Hemoglobin A1C 6.20 %     Narrative:      Hemoglobin A1C Ranges:    Increased Risk for Diabetes  5.7% to 6.4%  Diabetes                     >= 6.5%  Diabetic Goal                < 7.0%    POC Glucose Once [[id number]]  (Abnormal) Collected: 03/08/23 0641    Specimen: Blood Updated: 03/08/23 0643     Glucose 176 mg/dL      Comment: Meter: [id number] : [id number] Mark Arrington       POC Glucose Once [[id number]]  (Abnormal) Collected: 03/07/23 2140    Specimen: Blood Updated: 03/07/23 2146     Glucose 180 mg/dL      Comment: Meter: [id number] : [id number] Jonathan Aparna NA       High Sensitivity Troponin T 2Hr [[id number]]  (Abnormal) Collected: 03/07/23 1441    Specimen: Blood Updated: 03/07/23 1520     HS Troponin T 67 ng/L      Troponin T Delta -10 ng/L     Narrative:      High Sensitive Troponin T Reference Range:  <10.0 ng/L- Negative Female for AMI  <15.0 ng/L- Negative Male for AMI  >=10 - Abnormal Female indicating possible myocardial injury.  >=15 - Abnormal Male indicating possible myocardial injury.   Clinicians would have to utilize clinical acumen, EKG, Troponin, and serial changes to determine if it is an Acute Myocardial Infarction or myocardial injury due to an underlying chronic condition.         High Sensitivity Troponin T [[id number]]  (Abnormal) Collected: 03/07/23 1153    Specimen: Blood Updated: 03/07/23 1324     HS Troponin T 77 ng/L     Narrative:      High Sensitive Troponin T Reference Range:  <10.0 ng/L- Negative Female for AMI  <15.0 ng/L- Negative Male for AMI  >=10 - Abnormal Female indicating possible myocardial injury.  >=15 - Abnormal Male indicating possible myocardial injury.   Clinicians would have to utilize clinical acumen, EKG, Troponin,  and serial changes to determine if it is an Acute Myocardial Infarction or myocardial injury due to an underlying chronic condition.         Comprehensive Metabolic Panel [[id number]]  (Abnormal) Collected: 03/07/23 1153    Specimen: Blood Updated: 03/07/23 1312     Glucose 79 mg/dL      BUN 31 mg/dL      Creatinine 1.50 mg/dL      Sodium 137 mmol/L      Potassium 4.4 mmol/L      Chloride 102 mmol/L      CO2 25.1 mmol/L      Calcium 8.8 mg/dL      Total Protein 6.5 g/dL      Albumin 4.1 g/dL      ALT (SGPT) 19 U/L      AST (SGOT) 15 U/L      Alkaline Phosphatase 58 U/L      Total Bilirubin 0.5 mg/dL      Globulin 2.4 gm/dL      A/G Ratio 1.7 g/dL      BUN/Creatinine Ratio 20.7     Anion Gap 9.9 mmol/L      eGFR 32.6 mL/min/1.73     Narrative:      GFR Normal >60  Chronic Kidney Disease <60  Kidney Failure <15    The GFR formula is only valid for adults with stable renal function between ages 18 and 70.    Lipase [[id number]]  (Normal) Collected: 03/07/23 1153    Specimen: Blood Updated: 03/07/23 1312     Lipase 37 U/L     BNP [[id number]]  (Abnormal) Collected: 03/07/23 1153    Specimen: Blood Updated: 03/07/23 1310     proBNP 2,394.0 pg/mL     Narrative:      Among patients with dyspnea, NT-proBNP is highly sensitive for the detection of acute congestive heart failure. In addition NT-proBNP of <300 pg/ml effectively rules out acute congestive heart failure with 99% negative predictive value.    Results may be falsely decreased if patient taking Biotin.      Respiratory Panel PCR w/COVID-19(SARS-CoV-2) LATONIA/CHARLIE/ALEJANDRO/PAD/COR/MAD/PASQUALE In-House, NP Swab in UTM/[facility], 3-4 HR TAT - Swab, Nasopharynx [[id number]]  (Normal) Collected: 03/07/23 1142    Specimen: Swab from Nasopharynx Updated: 03/07/23 1251     ADENOVIRUS, PCR Not Detected     Coronavirus 229E Not Detected     Coronavirus HKU1 Not Detected     Coronavirus NL63 Not Detected     Coronavirus OC43 Not Detected     COVID19 Not Detected     Human Metapneumovirus Not  Detected     Human Rhinovirus/Enterovirus Not Detected     Influenza A PCR Not Detected     Influenza B PCR Not Detected     Parainfluenza Virus 1 Not Detected     Parainfluenza Virus 2 Not Detected     Parainfluenza Virus 3 Not Detected     Parainfluenza Virus 4 Not Detected     RSV, PCR Not Detected     Bordetella pertussis pcr Not Detected     Bordetella parapertussis PCR Not Detected     Chlamydophila pneumoniae PCR Not Detected     Mycoplasma pneumo by PCR Not Detected    Narrative:      In the setting of a positive respiratory panel with a viral infection PLUS a negative procalcitonin without other underlying concern for bacterial infection, consider observing off antibiotics or discontinuation of antibiotics and continue supportive care. If the respiratory panel is positive for atypical bacterial infection (Bordetella pertussis, Chlamydophila pneumoniae, or Mycoplasma pneumoniae), consider antibiotic de-escalation to target atypical bacterial infection.    Manual Differential [[id number]]  (Abnormal) Collected: 03/07/23 1153    Specimen: Blood Updated: 03/07/23 1239     Neutrophil % 85.6 %      Lymphocyte % 9.3 %      Monocyte % 5.2 %      Neutrophils Absolute 7.86 10*3/mm3      Lymphocytes Absolute 0.85 10*3/mm3      Monocytes Absolute 0.48 10*3/mm3      RBC Morphology Normal     Smudge Cells Slight/1+     Platelet Morphology Normal    CBC & Differential [[id number]]  (Abnormal) Collected: 03/07/23 1153    Specimen: Blood Updated: 03/07/23 1239    Narrative:      The following orders were created for panel order CBC & Differential.  Procedure                               Abnormality         Status                     ---------                               -----------         ------                     CBC Auto Differential[[id number]]        Abnormal            Final result                 Please view results for these tests on the individual orders.    CBC Auto Differential [[id number]]  (Abnormal) Collected:  03/07/23 1153    Specimen: Blood Updated: 03/07/23 1239     WBC 9.18 10*3/mm3      RBC 4.59 10*6/mm3      Hemoglobin 13.2 g/dL      Hematocrit 40.8 %      MCV 88.9 fL      MCH 28.8 pg      MCHC 32.4 g/dL      RDW 15.3 %      RDW-SD 49.6 fl      MPV 12.5 fL      Platelets 41 10*3/mm3     Urinalysis With Culture If Indicated - Straight Cath [[id number]]  (Normal) Collected: 03/07/23 1138    Specimen: Urine from Straight Cath Updated: 03/07/23 1228     Color, UA Yellow     Appearance, UA Clear     pH, UA 6.5     Specific Gravity, UA 1.007     Glucose, UA Negative     Ketones, UA Negative     Bilirubin, UA Negative     Blood, UA Negative     Protein, UA Negative     Leuk Esterase, UA Negative     Nitrite, UA Negative     Urobilinogen, UA 1.0 E.U./dL    Narrative:      In absence of clinical symptoms, the presence of pyuria, bacteria, and/or nitrites on the urinalysis result does not correlate with infection.  Urine microscopic not indicated.    POC Glucose Once [[id number]]  (Normal) Collected: 03/07/23 1140    Specimen: Blood Updated: 03/07/23 1142     Glucose 84 mg/dL      Comment: Meter: [id number] : [id number] Chad JOHNSON           Data Review:  Results from last 7 days   Lab Units 03/11/23  0654 03/10/23  0523 03/09/23  0525   SODIUM mmol/L 134* 133* 136   POTASSIUM mmol/L 4.3 4.3 4.3   CHLORIDE mmol/L 104 102 102   CO2 mmol/L 21.4* 20.9* 23.7   BUN mg/dL 41* 44* 42*   CREATININE mg/dL 1.36* 1.67* 1.73*   GLUCOSE mg/dL 170* 203* 220*   CALCIUM mg/dL 8.3 8.0* 8.0*     Results from last 7 days   Lab Units 03/11/23  0654 03/10/23  0523 03/09/23  0525   WBC 10*3/mm3 4.58 4.77 6.44   HEMOGLOBIN g/dL 10.9* 11.1* 12.4   HEMATOCRIT % 32.2* 33.7* 38.3   PLATELETS 10*3/mm3 77* 56* 41*         Results from last 7 days   Lab Units 03/08/23  0621   HEMOGLOBIN A1C % 6.20*     Lab Results   Lab Value Date/Time    TROPONINT 67 (C) 03/07/2023 1441    TROPONINT 77 (C) 03/07/2023 1153    TROPONINT 0.052 (C) 12/20/2022 2113     TROPONINT 0.051 (C) 12/20/2022 1350    TROPONINT 0.010 08/07/2022 1046    TROPONINT <0.010 08/01/2022 1017    TROPONINT 0.017 07/31/2022 0920    TROPONINT 0.037 (C) 07/30/2022 1034    TROPONINT 0.034 (C) 03/04/2022 0645    TROPONINT 0.024 03/02/2022 1632         Results from last 7 days   Lab Units 03/09/23  0525 03/08/23  0621 03/07/23  1153   ALK PHOS U/L 52 51 58   BILIRUBIN mg/dL 0.5 0.6 0.5   ALT (SGPT) U/L 13 14 19   AST (SGOT) U/L 12 9 15         Results from last 7 days   Lab Units 03/08/23  0621   HEMOGLOBIN A1C % 6.20*     Glucose   Date/Time Value Ref Range Status   03/11/2023 1058 215 (H) 70 - 130 mg/dL Final     Comment:     Meter: [id number] : [id number] Connie James NA   03/11/2023 0542 197 (H) 70 - 130 mg/dL Final     Comment:     Meter: [id number] : [id number] David Hearn NA   03/10/2023 2017 353 (H) 70 - 130 mg/dL Final     Comment:     Meter: [id number] : [id number] David Phillipse NA   03/10/2023 1629 224 (H) 70 - 130 mg/dL Final     Comment:     Meter: [id number] : [id number] Roberth Garciaee NA   03/10/2023 1116 289 (H) 70 - 130 mg/dL Final     Comment:     Meter: [id number] : [id number] Roberth Treva NA   03/10/2023 0639 172 (H) 70 - 130 mg/dL Final     Comment:     Meter: [id number] : [id number] Vinnie JOHNSON   03/10/2023 0207 255 (H) 70 - 130 mg/dL Final     Comment:     Meter: [id number] : [id number] Mariposa Forman RN   03/09/2023 1955 357 (H) 70 - 130 mg/dL Final     Comment:     Meter: [id number] : [id number] Vinnie JOHNSON           Past Medical History:   Diagnosis Date   • Aortic valve stenosis     s/p tissue AVR   • Back pain    • CKD (chronic kidney disease)    • Colitis due to Clostridioides difficile 01/26/2022   • Diastolic dysfunction     Grade 2 per echocardiogram 2013   • Diverticulosis    • Exertional shortness of breath    • Heart disease    • Hiatal hernia    • Hyperlipidemia    • Hypertension    • Hyperthyroidism    •  Hypertriglyceridemia 05/31/2018   • Hypothyroidism    • L5 compression fracture (HCC) 08/2022   • Left ventricular hypertrophy    • Legally blind    • Liver disease    • Macular degeneration    • Mitral regurgitation    • Osteoarthritis of hip    • Osteoporosis    • Pancreatitis 01/26/2022   • Paroxysmal atrial fibrillation (HCC)    • Premature ventricular contractions    • Pulmonary hypertension (HCC)    • Renal insufficiency syndrome    • Type 2 diabetes mellitus (HCC)    • Uterine cancer (HCC)        Assessment:  Active Hospital Problems    Diagnosis  POA   • **Bilateral low back pain without sciatica, unspecified chronicity [M54.50]  Yes   • Urine retention [R33.9]  Unknown   • Acute left-sided low back pain with left-sided sciatica [M54.42]  Unknown   • Chronic ITP (idiopathic thrombocytopenia) (HCC) [D69.3]  Yes   • Closed fracture of fifth lumbar vertebra (HCC) [S32.059A]  Yes   • Hypothyroidism [E03.9]  Yes   • Chronic pain disorder [G89.4]  Yes   • Macular degeneration [H35.30]  Yes   • Type 2 diabetes mellitus with hyperglycemia (HCC) [E11.65]  Yes   • Paroxysmal atrial fibrillation (HCC) [I48.0]  Yes   • Essential hypertension [I10]  Yes   • Legally blind [H54.8]  Yes   • Stage 4 chronic kidney disease (HCC) [N18.4]  Yes      Resolved Hospital Problems   No resolved problems to display.       Plan:  Neurosurgery and hematology consults noted.   Follow lab.  Pain control.??  IVIG. CT abdomen report noted and RX for constipation.   urology consult noted and got Hoffman.    Juan Lennon MD  3/11/2023  14:36 EST

## 2023-03-11 NOTE — PROGRESS NOTES
REASON FOR FOLLOWUP/CHIEF COMPLAINT:   ITP     HISTORY OF PRESENT ILLNESS:   She continues to have back apin.   Received day 1 of IVIG and plts 56,0000.   LESI by JUAN LUIS once platelets greater than 100,000.    Past Medical History, Past Surgical History, Social History, Family History have been reviewed and are without significant changes except as mentioned.    Review of Systems   Review of Systems   Review of systems as mentioned in the HPI otherwise negative    Medications:  The current medication list was reviewed in the EMR    ALLERGIES:    Allergies   Allergen Reactions   • Erythromycin Unknown (See Comments) and Other (See Comments)     Pt states she does not remember but it was many years ago  Pt states she does not remember but it was many years ago   • Statins Myalgia   • Cephalexin Other (See Comments) and Unknown (See Comments)     June 2022 Pt has tolerated ceftriaxone and cefepime during admission.   Pt states she does not remember reaction but it was many years ago   • Penicillins Rash   • Sulfa Antibiotics Itching and Rash              Vitals:    03/10/23 1601 03/10/23 1932 03/10/23 1947 03/10/23 1948   BP: 156/65 132/69     BP Location: Left arm Right arm     Patient Position: Lying Lying     Pulse: 52 60 59 57   Resp: 16 16     Temp: 97.9 °F (36.6 °C) 97.7 °F (36.5 °C)     TempSrc: Oral Oral     SpO2:  98% 99% 99%   Weight:       Height:         Physical Exam    CONSTITUTIONAL:  Vital signs reviewed.  No distress, looks comfortable.  EYES:  Conjunctivae and lids unremarkable.  PERRLA  EARS,NOSE,MOUTH,THROAT:  Ears and nose appear unremarkable.  Lips, teeth, gums appear unremarkable.  RESPIRATORY:  Normal respiratory effort.  Lungs clear to auscultation bilaterally.  CARDIOVASCULAR:  Normal S1, S2.  No murmurs rubs or gallops.  No significant lower extremity edema.  GASTROINTESTINAL: Abdomen appears unremarkable.  Nontender.  No hepatomegaly.  No splenomegaly.  SKIN:  Warm.  No  rashes.  PSYCHIATRIC:  Normal judgment and insight.  Normal mood and affect.     I have reexamined the patient and the results are consistent with the previously documented exam. Alisa Mendoza MD     RECENT LABS:  WBC   Date Value Ref Range Status   03/10/2023 4.77 3.40 - 10.80 10*3/mm3 Final   03/09/2023 6.44 3.40 - 10.80 10*3/mm3 Final   03/08/2023 6.02 3.40 - 10.80 10*3/mm3 Final     Hemoglobin   Date Value Ref Range Status   03/10/2023 11.1 (L) 12.0 - 15.9 g/dL Final   03/09/2023 12.4 12.0 - 15.9 g/dL Final   03/08/2023 12.4 12.0 - 15.9 g/dL Final     Platelets   Date Value Ref Range Status   03/10/2023 56 (L) 140 - 450 10*3/mm3 Final   03/09/2023 41 (C) 140 - 450 10*3/mm3 Final   03/08/2023 38 (C) 140 - 450 10*3/mm3 Final   03/08/2023 40 (C) 140 - 450 10*3/mm3 Final       ASSESSMENT/PLAN:  Helena Carmen N630/1     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  • Currently on prednisone 10 mg daily.  • Platelet count is around baseline at 41,000  • Due to ongoing back pain an epidural injection is being considered.  • Anesthesiology would require platelet count to be greater than 100,000.    • IPF greater than 10  • Received 1 day of IVIG and platelets improved at 56,000.  • Day 2 IVIG today     *Back pain and lower extremity pain  • Secondary to degenerative changes in the lumbar spine.  • Considering epidural injection.  • Require platelet count to be 100,000.  • Continue IVIG and platelets not at goal, could consider 1 unit of platelets     *Acute on chronic diastolic congestive heart failure, -management per primary     Recommendations  • IVIG today  • Daily CBC

## 2023-03-11 NOTE — PLAN OF CARE
Goal Outcome Evaluation:  Plan of Care Reviewed With: patient, daughter        Progress: improving  Outcome Evaluation: The patient was seen this AM for PT treatment session. Patient requiring intermittent bouts of contact guard-min assist for bed mobility. She is able to ambulate 6 ft with rw and CGA and then transfer to [facility] with rw and CGA. Patient exhibiting significant forward flexed posture but stating this is her normal posture. Overall, she requires less time to complete tasks compared to initial evaluation. She continues to require additional skilled PT to address limitations in strength, endurance, and balance affecting functional mobility.

## 2023-03-11 NOTE — PLAN OF CARE
Problem: Adult Inpatient Plan of Care  Goal: Plan of Care Review  Outcome: Ongoing, Progressing  Flowsheets (Taken 3/11/2023 0355)  Progress: improving  Plan of Care Reviewed With: patient  Outcome Evaluation:   VSS   FC placed to bsd per Urol   Q2H turn   medicated for pain   SR-SB on monitor   no acute distress noted   will cont to monitor   Goal Outcome Evaluation:  Plan of Care Reviewed With: patient        Progress: improving  Outcome Evaluation: VSS; FC placed to bsd per Urol; Q2H turn; medicated for pain; SR-SB on monitor; no acute distress noted; will cont to monitor

## 2023-03-12 ENCOUNTER — APPOINTMENT (OUTPATIENT)
Dept: CT IMAGING | Facility: HOSPITAL | Age: 88
DRG: 551 | End: 2023-03-12
Payer: MEDICARE

## 2023-03-12 LAB
ANION GAP SERPL CALCULATED.3IONS-SCNC: 7 MMOL/L (ref 5–15)
BASOPHILS # BLD AUTO: 0.03 10*3/MM3 (ref 0–0.2)
BASOPHILS NFR BLD AUTO: 0.5 % (ref 0–1.5)
BUN SERPL-MCNC: 51 MG/DL (ref 8–23)
BUN/CREAT SERPL: 38.3 (ref 7–25)
CALCIUM SPEC-SCNC: 8.3 MG/DL (ref 8.2–9.6)
CHLORIDE SERPL-SCNC: 99 MMOL/L (ref 98–107)
CO2 SERPL-SCNC: 23 MMOL/L (ref 22–29)
CREAT SERPL-MCNC: 1.33 MG/DL (ref 0.57–1)
DEPRECATED RDW RBC AUTO: 47.9 FL (ref 37–54)
EGFRCR SERPLBLD CKD-EPI 2021: 37.6 ML/MIN/1.73
EOSINOPHIL # BLD AUTO: 0.05 10*3/MM3 (ref 0–0.4)
EOSINOPHIL NFR BLD AUTO: 0.9 % (ref 0.3–6.2)
ERYTHROCYTE [DISTWIDTH] IN BLOOD BY AUTOMATED COUNT: 15.8 % (ref 12.3–15.4)
FERRITIN SERPL-MCNC: 295 NG/ML (ref 13–150)
FOLATE SERPL-MCNC: 5.94 NG/ML (ref 4.78–24.2)
GLUCOSE BLDC GLUCOMTR-MCNC: 268 MG/DL (ref 70–130)
GLUCOSE BLDC GLUCOMTR-MCNC: 311 MG/DL (ref 70–130)
GLUCOSE BLDC GLUCOMTR-MCNC: 355 MG/DL (ref 70–130)
GLUCOSE BLDC GLUCOMTR-MCNC: 417 MG/DL (ref 70–130)
GLUCOSE BLDC GLUCOMTR-MCNC: 430 MG/DL (ref 70–130)
GLUCOSE SERPL-MCNC: 244 MG/DL (ref 65–99)
HCT VFR BLD AUTO: 32.1 % (ref 34–46.6)
HGB BLD-MCNC: 10.8 G/DL (ref 12–15.9)
IMM GRANULOCYTES # BLD AUTO: 0.04 10*3/MM3 (ref 0–0.05)
IMM GRANULOCYTES NFR BLD AUTO: 0.7 % (ref 0–0.5)
IRON 24H UR-MRATE: 135 MCG/DL (ref 37–145)
IRON SATN MFR SERPL: 51 % (ref 20–50)
LYMPHOCYTES # BLD AUTO: 1.56 10*3/MM3 (ref 0.7–3.1)
LYMPHOCYTES NFR BLD AUTO: 26.6 % (ref 19.6–45.3)
MCH RBC QN AUTO: 28.8 PG (ref 26.6–33)
MCHC RBC AUTO-ENTMCNC: 33.6 G/DL (ref 31.5–35.7)
MCV RBC AUTO: 85.6 FL (ref 79–97)
MONOCYTES # BLD AUTO: 0.62 10*3/MM3 (ref 0.1–0.9)
MONOCYTES NFR BLD AUTO: 10.6 % (ref 5–12)
NEUTROPHILS NFR BLD AUTO: 3.57 10*3/MM3 (ref 1.7–7)
NEUTROPHILS NFR BLD AUTO: 60.7 % (ref 42.7–76)
NRBC BLD AUTO-RTO: 0.2 /100 WBC (ref 0–0.2)
PLATELET # BLD AUTO: 100 10*3/MM3 (ref 140–450)
PMV BLD AUTO: 10.6 FL (ref 6–12)
POTASSIUM SERPL-SCNC: 4.4 MMOL/L (ref 3.5–5.2)
RBC # BLD AUTO: 3.75 10*6/MM3 (ref 3.77–5.28)
SODIUM SERPL-SCNC: 129 MMOL/L (ref 136–145)
TIBC SERPL-MCNC: 264 MCG/DL (ref 298–536)
TRANSFERRIN SERPL-MCNC: 177 MG/DL (ref 200–360)
VIT B12 BLD-MCNC: 236 PG/ML (ref 211–946)
WBC NRBC COR # BLD: 5.87 10*3/MM3 (ref 3.4–10.8)

## 2023-03-12 PROCEDURE — 63710000001 INSULIN LISPRO (HUMAN) PER 5 UNITS: Performed by: INTERNAL MEDICINE

## 2023-03-12 PROCEDURE — 63710000001 PREDNISONE PER 5 MG: Performed by: INTERNAL MEDICINE

## 2023-03-12 PROCEDURE — 71250 CT THORAX DX C-: CPT

## 2023-03-12 PROCEDURE — 83540 ASSAY OF IRON: CPT | Performed by: HOSPITALIST

## 2023-03-12 PROCEDURE — 82728 ASSAY OF FERRITIN: CPT | Performed by: HOSPITALIST

## 2023-03-12 PROCEDURE — 80048 BASIC METABOLIC PNL TOTAL CA: CPT | Performed by: HOSPITALIST

## 2023-03-12 PROCEDURE — 82607 VITAMIN B-12: CPT | Performed by: HOSPITALIST

## 2023-03-12 PROCEDURE — 82962 GLUCOSE BLOOD TEST: CPT

## 2023-03-12 PROCEDURE — 94799 UNLISTED PULMONARY SVC/PX: CPT

## 2023-03-12 PROCEDURE — 82746 ASSAY OF FOLIC ACID SERUM: CPT | Performed by: HOSPITALIST

## 2023-03-12 PROCEDURE — 25010000002 ONDANSETRON PER 1 MG: Performed by: INTERNAL MEDICINE

## 2023-03-12 PROCEDURE — 94664 DEMO&/EVAL PT USE INHALER: CPT

## 2023-03-12 PROCEDURE — 84466 ASSAY OF TRANSFERRIN: CPT | Performed by: HOSPITALIST

## 2023-03-12 PROCEDURE — 94761 N-INVAS EAR/PLS OXIMETRY MLT: CPT

## 2023-03-12 PROCEDURE — 85025 COMPLETE CBC W/AUTO DIFF WBC: CPT | Performed by: HOSPITALIST

## 2023-03-12 PROCEDURE — 99232 SBSQ HOSP IP/OBS MODERATE 35: CPT | Performed by: INTERNAL MEDICINE

## 2023-03-12 RX ADMIN — LEVOTHYROXINE SODIUM 50 MCG: 0.05 TABLET ORAL at 06:25

## 2023-03-12 RX ADMIN — BUDESONIDE AND FORMOTEROL FUMARATE DIHYDRATE 2 PUFF: 160; 4.5 AEROSOL RESPIRATORY (INHALATION) at 19:20

## 2023-03-12 RX ADMIN — INSULIN LISPRO 8 UNITS: 100 INJECTION, SOLUTION INTRAVENOUS; SUBCUTANEOUS at 18:10

## 2023-03-12 RX ADMIN — Medication 10 ML: at 20:41

## 2023-03-12 RX ADMIN — Medication 10 ML: at 08:39

## 2023-03-12 RX ADMIN — Medication 1000 UNITS: at 08:39

## 2023-03-12 RX ADMIN — MONTELUKAST SODIUM 10 MG: 10 TABLET, FILM COATED ORAL at 20:15

## 2023-03-12 RX ADMIN — ONDANSETRON 4 MG: 2 INJECTION INTRAMUSCULAR; INTRAVENOUS at 11:47

## 2023-03-12 RX ADMIN — HYDROCODONE BITARTRATE AND ACETAMINOPHEN 1 TABLET: 7.5; 325 TABLET ORAL at 06:25

## 2023-03-12 RX ADMIN — HYDROCODONE BITARTRATE AND ACETAMINOPHEN 1 TABLET: 7.5; 325 TABLET ORAL at 00:36

## 2023-03-12 RX ADMIN — FUROSEMIDE 40 MG: 40 TABLET ORAL at 08:39

## 2023-03-12 RX ADMIN — GABAPENTIN 600 MG: 300 CAPSULE ORAL at 08:39

## 2023-03-12 RX ADMIN — HYDRALAZINE HYDROCHLORIDE 50 MG: 50 TABLET ORAL at 08:39

## 2023-03-12 RX ADMIN — ACETAMINOPHEN 650 MG: 325 TABLET, FILM COATED ORAL at 20:16

## 2023-03-12 RX ADMIN — INSULIN LISPRO 7 UNITS: 100 INJECTION, SOLUTION INTRAVENOUS; SUBCUTANEOUS at 11:47

## 2023-03-12 RX ADMIN — LIDOCAINE 1 PATCH: 50 PATCH CUTANEOUS at 20:41

## 2023-03-12 RX ADMIN — CARVEDILOL 12.5 MG: 12.5 TABLET, FILM COATED ORAL at 08:39

## 2023-03-12 RX ADMIN — INSULIN GLARGINE-YFGN 20 UNITS: 100 INJECTION, SOLUTION SUBCUTANEOUS at 20:16

## 2023-03-12 RX ADMIN — BUDESONIDE AND FORMOTEROL FUMARATE DIHYDRATE 2 PUFF: 160; 4.5 AEROSOL RESPIRATORY (INHALATION) at 07:55

## 2023-03-12 RX ADMIN — GABAPENTIN 600 MG: 300 CAPSULE ORAL at 20:16

## 2023-03-12 RX ADMIN — HYDROCODONE BITARTRATE AND ACETAMINOPHEN 1 TABLET: 7.5; 325 TABLET ORAL at 14:03

## 2023-03-12 RX ADMIN — PREDNISONE 10 MG: 10 TABLET ORAL at 08:39

## 2023-03-12 RX ADMIN — HYDROCODONE BITARTRATE AND ACETAMINOPHEN 1 TABLET: 7.5; 325 TABLET ORAL at 18:10

## 2023-03-12 RX ADMIN — HYDRALAZINE HYDROCHLORIDE 50 MG: 50 TABLET ORAL at 20:15

## 2023-03-12 RX ADMIN — TAMSULOSIN HYDROCHLORIDE 0.4 MG: 0.4 CAPSULE ORAL at 08:39

## 2023-03-12 RX ADMIN — CARVEDILOL 12.5 MG: 12.5 TABLET, FILM COATED ORAL at 20:15

## 2023-03-12 RX ADMIN — INSULIN LISPRO 4 UNITS: 100 INJECTION, SOLUTION INTRAVENOUS; SUBCUTANEOUS at 08:39

## 2023-03-12 RX ADMIN — CETIRIZINE HYDROCHLORIDE 10 MG: 10 TABLET ORAL at 08:39

## 2023-03-12 RX ADMIN — AMLODIPINE BESYLATE 2.5 MG: 2.5 TABLET ORAL at 08:39

## 2023-03-12 NOTE — PROGRESS NOTES
"DAILY PROGRESS NOTE  [facility]    Patient Identification:  Name: Helena Carmen  Age: 89 y.o.  Sex: female  :  1931  MRN: [id number]         Primary Care Physician: Mariah Hickman MD    Subjective:  Interval History:She she complains of pain.    Objective:    Scheduled Meds:amLODIPine, 2.5 mg, Oral, Daily  budesonide-formoterol, 2 puff, Inhalation, BID - RT  carvedilol, 12.5 mg, Oral, BID  cetirizine, 10 mg, Oral, Daily  cholecalciferol, 1,000 Units, Oral, Daily  furosemide, 40 mg, Oral, Daily  gabapentin, 600 mg, Oral, Q12H  hydrALAZINE, 50 mg, Oral, BID  HYDROcodone-acetaminophen, 1 tablet, Oral, Q6H  insulin glargine, 20 Units, Subcutaneous, Nightly  insulin lispro, 0-9 Units, Subcutaneous, TID AC  levothyroxine, 50 mcg, Oral, Q AM  lidocaine, 1 patch, Transdermal, Q24H  montelukast, 10 mg, Oral, Nightly  predniSONE, 10 mg, Oral, Daily With Breakfast  senna-docusate sodium, 2 tablet, Oral, Nightly  sodium chloride, 10 mL, Intravenous, Q12H  tamsulosin, 0.4 mg, Oral, Daily      Continuous Infusions:     Vital signs in last 24 hours:  Temp:  [97.4 °F (36.3 °C)-97.8 °F (36.6 °C)] 97.8 °F (36.6 °C)  Heart Rate:  [52-59] 52  Resp:  [16-18] 16  BP: (136-163)/(48-75) 161/75    Intake/Output:    Intake/Output Summary (Last 24 hours) at 3/12/2023 1505  Last data filed at 3/12/2023 1336  Gross per 24 hour   Intake 600 ml   Output 2975 ml   Net -2375 ml       Exam:  /75 (BP Location: Left arm, Patient Position: Sitting)   Pulse 52   Temp 97.8 °F (36.6 °C) (Oral)   Resp 16   Ht 144.8 cm (57.01\")   Wt 75.1 kg (165 lb 9.1 oz)   SpO2 95%   BMI 35.82 kg/m²     General Appearance:    Alert, cooperative, no distress   Head:    Normocephalic, without obvious abnormality, atraumatic   Eyes:       Throat:   Lips, tongue, gums normal   Neck:   Supple, symmetrical, trachea midline, no JVD   Lungs:     Clear to auscultation bilaterally, respirations unlabored   Chest Wall:    No tenderness or " deformity    Heart:    Regular rate and rhythm, S1 and S2 normal, no murmur,no  Rub or gallop   Abdomen:     Soft, nontender, bowel sounds active, no masses, no organomegaly    Extremities:   Extremities normal, atraumatic, no cyanosis or edema   Pulses:      Skin:   Skin is warm and dry,  no rashes or palpable lesions   Neurologic:   Very weak      Lab Results (last 72 hours)     Procedure Component Value Units Date/Time    POC Glucose Once [[id number]]  (Abnormal) Collected: 03/08/23 1108    Specimen: Blood Updated: 03/08/23 1111     Glucose 167 mg/dL      Comment: Meter: [id number] : [id number] Quynh JOHNSON       CBC Auto Differential [[id number]]  (Abnormal) Collected: 03/08/23 0621    Specimen: Blood Updated: 03/08/23 0722     WBC 6.02 10*3/mm3      RBC 4.50 10*6/mm3      Hemoglobin 12.4 g/dL      Hematocrit 39.0 %      MCV 86.7 fL      MCH 27.6 pg      MCHC 31.8 g/dL      RDW 15.0 %      RDW-SD 47.1 fl      MPV 12.5 fL      Platelets 40 10*3/mm3      Neutrophil % 64.2 %      Lymphocyte % 24.4 %      Monocyte % 9.8 %      Eosinophil % 0.8 %      Basophil % 0.3 %      Neutrophils, Absolute 3.86 10*3/mm3      Lymphocytes, Absolute 1.47 10*3/mm3      Monocytes, Absolute 0.59 10*3/mm3      Eosinophils, Absolute 0.05 10*3/mm3      Basophils, Absolute 0.02 10*3/mm3     Comprehensive Metabolic Panel [[id number]]  (Abnormal) Collected: 03/08/23 0621    Specimen: Blood Updated: 03/08/23 0654     Glucose 191 mg/dL      BUN 34 mg/dL      Creatinine 1.54 mg/dL      Sodium 139 mmol/L      Potassium 4.4 mmol/L      Chloride 104 mmol/L      CO2 24.0 mmol/L      Calcium 8.1 mg/dL      Total Protein 6.0 g/dL      Albumin 3.6 g/dL      ALT (SGPT) 14 U/L      AST (SGOT) 9 U/L      Alkaline Phosphatase 51 U/L      Total Bilirubin 0.6 mg/dL      Globulin 2.4 gm/dL      A/G Ratio 1.5 g/dL      BUN/Creatinine Ratio 22.1     Anion Gap 11.0 mmol/L      eGFR 31.5 mL/min/1.73     Narrative:      GFR Normal >60  Chronic Kidney  Disease <60  Kidney Failure <15    The GFR formula is only valid for adults with stable renal function between ages 18 and 70.    Hemoglobin A1c [[id number]]  (Abnormal) Collected: 03/08/23 0621    Specimen: Blood Updated: 03/08/23 0648     Hemoglobin A1C 6.20 %     Narrative:      Hemoglobin A1C Ranges:    Increased Risk for Diabetes  5.7% to 6.4%  Diabetes                     >= 6.5%  Diabetic Goal                < 7.0%    POC Glucose Once [[id number]]  (Abnormal) Collected: 03/08/23 0641    Specimen: Blood Updated: 03/08/23 0643     Glucose 176 mg/dL      Comment: Meter: [id number] : [id number] Mark Arrington       POC Glucose Once [[id number]]  (Abnormal) Collected: 03/07/23 2140    Specimen: Blood Updated: 03/07/23 2146     Glucose 180 mg/dL      Comment: Meter: [id number] : [id number] Jonathan Aparna NA       High Sensitivity Troponin T 2Hr [[id number]]  (Abnormal) Collected: 03/07/23 1441    Specimen: Blood Updated: 03/07/23 1520     HS Troponin T 67 ng/L      Troponin T Delta -10 ng/L     Narrative:      High Sensitive Troponin T Reference Range:  <10.0 ng/L- Negative Female for AMI  <15.0 ng/L- Negative Male for AMI  >=10 - Abnormal Female indicating possible myocardial injury.  >=15 - Abnormal Male indicating possible myocardial injury.   Clinicians would have to utilize clinical acumen, EKG, Troponin, and serial changes to determine if it is an Acute Myocardial Infarction or myocardial injury due to an underlying chronic condition.         High Sensitivity Troponin T [[id number]]  (Abnormal) Collected: 03/07/23 1153    Specimen: Blood Updated: 03/07/23 1324     HS Troponin T 77 ng/L     Narrative:      High Sensitive Troponin T Reference Range:  <10.0 ng/L- Negative Female for AMI  <15.0 ng/L- Negative Male for AMI  >=10 - Abnormal Female indicating possible myocardial injury.  >=15 - Abnormal Male indicating possible myocardial injury.   Clinicians would have to utilize clinical acumen, EKG, Troponin,  and serial changes to determine if it is an Acute Myocardial Infarction or myocardial injury due to an underlying chronic condition.         Comprehensive Metabolic Panel [[id number]]  (Abnormal) Collected: 03/07/23 1153    Specimen: Blood Updated: 03/07/23 1312     Glucose 79 mg/dL      BUN 31 mg/dL      Creatinine 1.50 mg/dL      Sodium 137 mmol/L      Potassium 4.4 mmol/L      Chloride 102 mmol/L      CO2 25.1 mmol/L      Calcium 8.8 mg/dL      Total Protein 6.5 g/dL      Albumin 4.1 g/dL      ALT (SGPT) 19 U/L      AST (SGOT) 15 U/L      Alkaline Phosphatase 58 U/L      Total Bilirubin 0.5 mg/dL      Globulin 2.4 gm/dL      A/G Ratio 1.7 g/dL      BUN/Creatinine Ratio 20.7     Anion Gap 9.9 mmol/L      eGFR 32.6 mL/min/1.73     Narrative:      GFR Normal >60  Chronic Kidney Disease <60  Kidney Failure <15    The GFR formula is only valid for adults with stable renal function between ages 18 and 70.    Lipase [[id number]]  (Normal) Collected: 03/07/23 1153    Specimen: Blood Updated: 03/07/23 1312     Lipase 37 U/L     BNP [[id number]]  (Abnormal) Collected: 03/07/23 1153    Specimen: Blood Updated: 03/07/23 1310     proBNP 2,394.0 pg/mL     Narrative:      Among patients with dyspnea, NT-proBNP is highly sensitive for the detection of acute congestive heart failure. In addition NT-proBNP of <300 pg/ml effectively rules out acute congestive heart failure with 99% negative predictive value.    Results may be falsely decreased if patient taking Biotin.      Respiratory Panel PCR w/COVID-19(SARS-CoV-2) LATONIA/CHARLIE/ALEJANDRO/PAD/COR/MAD/PASQUALE In-House, NP Swab in [facility], 3-4 HR TAT - Swab, Nasopharynx [[id number]]  (Normal) Collected: 03/07/23 1142    Specimen: Swab from Nasopharynx Updated: 03/07/23 1251     ADENOVIRUS, PCR Not Detected     Coronavirus 229E Not Detected     Coronavirus HKU1 Not Detected     Coronavirus NL63 Not Detected     Coronavirus OC43 Not Detected     COVID19 Not Detected     Human Metapneumovirus Not  Detected     Human Rhinovirus/Enterovirus Not Detected     Influenza A PCR Not Detected     Influenza B PCR Not Detected     Parainfluenza Virus 1 Not Detected     Parainfluenza Virus 2 Not Detected     Parainfluenza Virus 3 Not Detected     Parainfluenza Virus 4 Not Detected     RSV, PCR Not Detected     Bordetella pertussis pcr Not Detected     Bordetella parapertussis PCR Not Detected     Chlamydophila pneumoniae PCR Not Detected     Mycoplasma pneumo by PCR Not Detected    Narrative:      In the setting of a positive respiratory panel with a viral infection PLUS a negative procalcitonin without other underlying concern for bacterial infection, consider observing off antibiotics or discontinuation of antibiotics and continue supportive care. If the respiratory panel is positive for atypical bacterial infection (Bordetella pertussis, Chlamydophila pneumoniae, or Mycoplasma pneumoniae), consider antibiotic de-escalation to target atypical bacterial infection.    Manual Differential [[id number]]  (Abnormal) Collected: 03/07/23 1153    Specimen: Blood Updated: 03/07/23 1239     Neutrophil % 85.6 %      Lymphocyte % 9.3 %      Monocyte % 5.2 %      Neutrophils Absolute 7.86 10*3/mm3      Lymphocytes Absolute 0.85 10*3/mm3      Monocytes Absolute 0.48 10*3/mm3      RBC Morphology Normal     Smudge Cells Slight/1+     Platelet Morphology Normal    CBC & Differential [[id number]]  (Abnormal) Collected: 03/07/23 1153    Specimen: Blood Updated: 03/07/23 1239    Narrative:      The following orders were created for panel order CBC & Differential.  Procedure                               Abnormality         Status                     ---------                               -----------         ------                     CBC Auto Differential[[id number]]        Abnormal            Final result                 Please view results for these tests on the individual orders.    CBC Auto Differential [[id number]]  (Abnormal) Collected:  03/07/23 1153    Specimen: Blood Updated: 03/07/23 1239     WBC 9.18 10*3/mm3      RBC 4.59 10*6/mm3      Hemoglobin 13.2 g/dL      Hematocrit 40.8 %      MCV 88.9 fL      MCH 28.8 pg      MCHC 32.4 g/dL      RDW 15.3 %      RDW-SD 49.6 fl      MPV 12.5 fL      Platelets 41 10*3/mm3     Urinalysis With Culture If Indicated - Straight Cath [[id number]]  (Normal) Collected: 03/07/23 1138    Specimen: Urine from Straight Cath Updated: 03/07/23 1228     Color, UA Yellow     Appearance, UA Clear     pH, UA 6.5     Specific Gravity, UA 1.007     Glucose, UA Negative     Ketones, UA Negative     Bilirubin, UA Negative     Blood, UA Negative     Protein, UA Negative     Leuk Esterase, UA Negative     Nitrite, UA Negative     Urobilinogen, UA 1.0 E.U./dL    Narrative:      In absence of clinical symptoms, the presence of pyuria, bacteria, and/or nitrites on the urinalysis result does not correlate with infection.  Urine microscopic not indicated.    POC Glucose Once [[id number]]  (Normal) Collected: 03/07/23 1140    Specimen: Blood Updated: 03/07/23 1142     Glucose 84 mg/dL      Comment: Meter: [id number] : [id number] Chad JOHNSON           Data Review:  Results from last 7 days   Lab Units 03/12/23  0704 03/11/23  0654 03/10/23  0523   SODIUM mmol/L 129* 134* 133*   POTASSIUM mmol/L 4.4 4.3 4.3   CHLORIDE mmol/L 99 104 102   CO2 mmol/L 23.0 21.4* 20.9*   BUN mg/dL 51* 41* 44*   CREATININE mg/dL 1.33* 1.36* 1.67*   GLUCOSE mg/dL 244* 170* 203*   CALCIUM mg/dL 8.3 8.3 8.0*     Results from last 7 days   Lab Units 03/12/23  0704 03/11/23  0654 03/10/23  0523   WBC 10*3/mm3 5.87 4.58 4.77   HEMOGLOBIN g/dL 10.8* 10.9* 11.1*   HEMATOCRIT % 32.1* 32.2* 33.7*   PLATELETS 10*3/mm3 100* 77* 56*         Results from last 7 days   Lab Units 03/08/23  0621   HEMOGLOBIN A1C % 6.20*     Lab Results   Lab Value Date/Time    TROPONINT 67 (C) 03/07/2023 1441    TROPONINT 77 (C) 03/07/2023 1153    TROPONINT 0.052 (C) 12/20/2022  2113    TROPONINT 0.051 (C) 12/20/2022 1350    TROPONINT 0.010 08/07/2022 1046    TROPONINT <0.010 08/01/2022 1017    TROPONINT 0.017 07/31/2022 0920    TROPONINT 0.037 (C) 07/30/2022 1034    TROPONINT 0.034 (C) 03/04/2022 0645    TROPONINT 0.024 03/02/2022 1632         Results from last 7 days   Lab Units 03/09/23  0525 03/08/23  0621 03/07/23  1153   ALK PHOS U/L 52 51 58   BILIRUBIN mg/dL 0.5 0.6 0.5   ALT (SGPT) U/L 13 14 19   AST (SGOT) U/L 12 9 15         Results from last 7 days   Lab Units 03/08/23  0621   HEMOGLOBIN A1C % 6.20*     Glucose   Date/Time Value Ref Range Status   03/12/2023 1125 311 (H) 70 - 130 mg/dL Final     Comment:     Meter: [id number] : [id number] Gloria RICCI   03/12/2023 0547 268 (H) 70 - 130 mg/dL Final     Comment:     Meter: [id number] : [id number] David Hearn NA   03/11/2023 2021 386 (H) 70 - 130 mg/dL Final     Comment:     Meter: [id number] : [id number] David Hearn NA   03/11/2023 2020 401 (C) 70 - 130 mg/dL Final     Comment:     Repeat Test Meter: [id number] : [id number] David Hearn NA   03/11/2023 1602 319 (H) 70 - 130 mg/dL Final     Comment:     Meter: [id number] : [id number] Connie Kunz Nerisa NA   03/11/2023 1058 215 (H) 70 - 130 mg/dL Final     Comment:     Meter: [id number] : [id number] Connie Ze Nerisa NA   03/11/2023 0542 197 (H) 70 - 130 mg/dL Final     Comment:     Meter: [id number] : [id number] David Hearn NA   03/10/2023 2017 353 (H) 70 - 130 mg/dL Final     Comment:     Meter: [id number] : [id number] David JOHNSON           Past Medical History:   Diagnosis Date   • Aortic valve stenosis     s/p tissue AVR   • Back pain    • CKD (chronic kidney disease)    • Colitis due to Clostridioides difficile 01/26/2022   • Diastolic dysfunction     Grade 2 per echocardiogram 2013   • Diverticulosis    • Exertional shortness of breath    • Heart disease    • Hiatal hernia    • Hyperlipidemia    • Hypertension    •  Hyperthyroidism    • Hypertriglyceridemia 05/31/2018   • Hypothyroidism    • L5 compression fracture (HCC) 08/2022   • Left ventricular hypertrophy    • Legally blind    • Liver disease    • Macular degeneration    • Mitral regurgitation    • Osteoarthritis of hip    • Osteoporosis    • Pancreatitis 01/26/2022   • Paroxysmal atrial fibrillation (HCC)    • Premature ventricular contractions    • Pulmonary hypertension (HCC)    • Renal insufficiency syndrome    • Type 2 diabetes mellitus (HCC)    • Uterine cancer (HCC)        Assessment:  Active Hospital Problems    Diagnosis  POA   • **Bilateral low back pain without sciatica, unspecified chronicity [M54.50]  Yes   • Urine retention [R33.9]  Unknown   • Acute left-sided low back pain with left-sided sciatica [M54.42]  Unknown   • Chronic ITP (idiopathic thrombocytopenia) (HCC) [D69.3]  Yes   • Closed fracture of fifth lumbar vertebra (HCC) [S32.059A]  Yes   • Hypothyroidism [E03.9]  Yes   • Chronic pain disorder [G89.4]  Yes   • Macular degeneration [H35.30]  Yes   • Type 2 diabetes mellitus with hyperglycemia (HCC) [E11.65]  Yes   • Paroxysmal atrial fibrillation (HCC) [I48.0]  Yes   • Essential hypertension [I10]  Yes   • Legally blind [H54.8]  Yes   • Stage 4 chronic kidney disease (HCC) [N18.4]  Yes      Resolved Hospital Problems   No resolved problems to display.       Plan:  Neurosurgery and hematology consults noted.   Follow lab.  Pain control.??  IVIG. CT abdomen report noted and RX for constipation.   urology consult noted and got Hoffman.  ??? Epidural steroids sometime    Juan Lennon MD  3/12/2023  15:05 EDT

## 2023-03-12 NOTE — PROGRESS NOTES
REASON FOR FOLLOWUP/CHIEF COMPLAINT:   ITP     HISTORY OF PRESENT ILLNESS:   She continues to have back pain which is improved today and she is sitting up in a chair.  No other new complaints at this time  LESI by JUAN LUIS once platelets greater than 100,000.  Platelets right at 100,000 today    Past Medical History, Past Surgical History, Social History, Family History have been reviewed and are without significant changes except as mentioned.    Review of Systems   Review of Systems   Review of systems as mentioned in the HPI otherwise negative    Medications:  The current medication list was reviewed in the EMR    ALLERGIES:    Allergies   Allergen Reactions   • Erythromycin Unknown (See Comments) and Other (See Comments)     Pt states she does not remember but it was many years ago  Pt states she does not remember but it was many years ago   • Statins Myalgia   • Cephalexin Other (See Comments) and Unknown (See Comments)     June 2022 Pt has tolerated ceftriaxone and cefepime during admission.   Pt states she does not remember reaction but it was many years ago   • Penicillins Rash   • Sulfa Antibiotics Itching and Rash              Vitals:    03/12/23 0500 03/12/23 0707 03/12/23 0755 03/12/23 1336   BP:  136/48  161/75   BP Location:  Left arm  Left arm   Patient Position:  Lying  Sitting   Pulse: 54  52    Resp:  16 16 16   Temp:  97.5 °F (36.4 °C)  97.8 °F (36.6 °C)   TempSrc:  Oral  Oral   SpO2: 97%  95%    Weight:       Height:         Physical Exam    CONSTITUTIONAL:  Vital signs reviewed.  No distress, looks comfortable.  EYES:  Conjunctivae and lids unremarkable.  PERRLA  EARS,NOSE,MOUTH,THROAT:  Ears and nose appear unremarkable.  Lips, teeth, gums appear unremarkable.  RESPIRATORY:  Normal respiratory effort.  Lungs clear to auscultation bilaterally.  CARDIOVASCULAR:  Normal S1, S2.  No murmurs rubs or gallops.  No significant lower extremity edema.  GASTROINTESTINAL: Abdomen appears unremarkable.   Nontender.  No hepatomegaly.  No splenomegaly.  SKIN:  Warm.  No rashes.  PSYCHIATRIC:  Normal judgment and insight.  Normal mood and affect.     I have reexamined the patient and the results are consistent with the previously documented exam. Alisa Mendoza MD     RECENT LABS:  WBC   Date Value Ref Range Status   03/12/2023 5.87 3.40 - 10.80 10*3/mm3 Final   03/11/2023 4.58 3.40 - 10.80 10*3/mm3 Final   03/10/2023 4.77 3.40 - 10.80 10*3/mm3 Final     Hemoglobin   Date Value Ref Range Status   03/12/2023 10.8 (L) 12.0 - 15.9 g/dL Final   03/11/2023 10.9 (L) 12.0 - 15.9 g/dL Final   03/10/2023 11.1 (L) 12.0 - 15.9 g/dL Final     Platelets   Date Value Ref Range Status   03/12/2023 100 (L) 140 - 450 10*3/mm3 Final   03/11/2023 77 (L) 140 - 450 10*3/mm3 Final   03/10/2023 56 (L) 140 - 450 10*3/mm3 Final       ASSESSMENT/PLAN:  Helena Carmen N630/1     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  • Currently on prednisone 10 mg daily.  • Platelet count is around baseline at 41,000  • Due to ongoing back pain an epidural injection is being considered.  • Anesthesiology would require platelet count to be greater than 100,000.    • IPF greater than 10  • IVIG initially entered at a dose of 400 mg/kg/day, discussed with pharmacy to dose adjust and today we will give a total dose of 1 g/kg   • Platelets improved to 100,000  • No further IVIG plan  • LESI planned for tomorrow     *Back pain and lower extremity pain  • Secondary to degenerative changes in the lumbar spine.  • Considering epidural injection.  • Require platelet count to be 100,000.  • Platelets at goal, LESI in am per JUAN LUIS      *Acute on chronic diastolic congestive heart failure, -management per primary     Recommendations  • Daily CBC  • No further IVIG, if any drop in platelets we can consider transfusing 1 unit of platelets tomorrow prior to his procedure

## 2023-03-12 NOTE — PLAN OF CARE
Problem: Skin Injury Risk Increased  Goal: Skin Health and Integrity  Outcome: Ongoing, Progressing  Intervention: Optimize Skin Protection  Recent Flowsheet Documentation  Taken 3/12/2023 0033 by Rajni Bates RN  Pressure Reduction Techniques:   frequent weight shift encouraged   weight shift assistance provided  Pressure Reduction Devices: alternating pressure pump (ADD)  Skin Protection:   adhesive use limited   incontinence pads utilized   transparent dressing maintained   tubing/devices free from skin contact     Problem: Fall Injury Risk  Goal: Absence of Fall and Fall-Related Injury  Outcome: Ongoing, Progressing  Intervention: Identify and Manage Contributors  Recent Flowsheet Documentation  Taken 3/12/2023 0420 by Rajni Bates RN  Medication Review/Management: medications reviewed  Taken 3/12/2023 0150 by Rajni Bates RN  Medication Review/Management: medications reviewed  Taken 3/12/2023 0033 by Rajni Bates RN  Medication Review/Management: medications reviewed  Intervention: Promote Injury-Free Environment  Recent Flowsheet Documentation  Taken 3/12/2023 0420 by Rajni Bates RN  Safety Promotion/Fall Prevention:   activity supervised   assistive device/personal items within reach   clutter free environment maintained   fall prevention program maintained   lighting adjusted   nonskid shoes/slippers when out of bed   room organization consistent   safety round/check completed  Taken 3/12/2023 0150 by Rajni Bates RN  Safety Promotion/Fall Prevention:   activity supervised   assistive device/personal items within reach   clutter free environment maintained   fall prevention program maintained   lighting adjusted   nonskid shoes/slippers when out of bed   room organization consistent   safety round/check completed  Taken 3/12/2023 0033 by Rajni Bates RN  Safety Promotion/Fall Prevention:   activity supervised   assistive device/personal items within reach   clutter free environment maintained    fall prevention program maintained   lighting adjusted   nonskid shoes/slippers when out of bed   safety round/check completed   room organization consistent     Problem: Adult Inpatient Plan of Care  Goal: Plan of Care Review  Outcome: Ongoing, Progressing  Flowsheets (Taken 3/12/2023 0515)  Progress: no change  Plan of Care Reviewed With: patient  Outcome Evaluation: No s/sx of distress noted at this time. Rested well throughout night. Vital  signs WDL. Fall precautions maintained. Will cont to monitor.  Goal: Patient-Specific Goal (Individualized)  Outcome: Ongoing, Progressing  Goal: Absence of Hospital-Acquired Illness or Injury  Outcome: Ongoing, Progressing  Intervention: Identify and Manage Fall Risk  Recent Flowsheet Documentation  Taken 3/12/2023 0420 by Rajni Bates RN  Safety Promotion/Fall Prevention:   activity supervised   assistive device/personal items within reach   clutter free environment maintained   fall prevention program maintained   lighting adjusted   nonskid shoes/slippers when out of bed   room organization consistent   safety round/check completed  Taken 3/12/2023 0150 by Rajni Bates, DENISE  Safety Promotion/Fall Prevention:   activity supervised   assistive device/personal items within reach   clutter free environment maintained   fall prevention program maintained   lighting adjusted   nonskid shoes/slippers when out of bed   room organization consistent   safety round/check completed  Taken 3/12/2023 0033 by Rajni Bates, DENISE  Safety Promotion/Fall Prevention:   activity supervised   assistive device/personal items within reach   clutter free environment maintained   fall prevention program maintained   lighting adjusted   nonskid shoes/slippers when out of bed   safety round/check completed   room organization consistent  Intervention: Prevent Skin Injury  Recent Flowsheet Documentation  Taken 3/12/2023 0033 by Rajni Bates, RN  Body Position:   left   tilted  Skin Protection:    adhesive use limited   incontinence pads utilized   transparent dressing maintained   tubing/devices free from skin contact  Intervention: Prevent and Manage VTE (Venous Thromboembolism) Risk  Recent Flowsheet Documentation  Taken 3/12/2023 0420 by Rajni Bates RN  Activity Management: activity adjusted per tolerance  Taken 3/12/2023 0150 by Rajni Bates RN  Activity Management: activity adjusted per tolerance  Taken 3/12/2023 0033 by Rajni Bates RN  Activity Management: activity adjusted per tolerance  Intervention: Prevent Infection  Recent Flowsheet Documentation  Taken 3/12/2023 0420 by Rajni Bates RN  Infection Prevention:   hand hygiene promoted   rest/sleep promoted   single patient room provided  Taken 3/12/2023 0150 by Rajni Bates RN  Infection Prevention:   hand hygiene promoted   rest/sleep promoted   single patient room provided  Taken 3/12/2023 0033 by Rajni Bates RN  Infection Prevention:   hand hygiene promoted   rest/sleep promoted   single patient room provided  Goal: Optimal Comfort and Wellbeing  Outcome: Ongoing, Progressing  Intervention: Provide Person-Centered Care  Recent Flowsheet Documentation  Taken 3/12/2023 0033 by Rajni Bates RN  Trust Relationship/Rapport:   care explained   reassurance provided   thoughts/feelings acknowledged  Goal: Readiness for Transition of Care  Outcome: Ongoing, Progressing   Goal Outcome Evaluation:  Plan of Care Reviewed With: patient        Progress: no change  Outcome Evaluation: No s/sx of distress noted at this time. Rested well throughout night. Vital  signs WDL. Fall precautions maintained. Will cont to monitor.

## 2023-03-12 NOTE — PLAN OF CARE
Goal Outcome Evaluation:           Progress: improving  Outcome Evaluation: vss, pt up to chair today, medicated with scheduled pain meds and prn nausea medicine, doppler of right upper arm ordered for swelling and pain, ct of the chest ordered

## 2023-03-13 ENCOUNTER — APPOINTMENT (OUTPATIENT)
Dept: PAIN MEDICINE | Facility: HOSPITAL | Age: 88
DRG: 551 | End: 2023-03-13
Payer: MEDICARE

## 2023-03-13 ENCOUNTER — ANESTHESIA EVENT (OUTPATIENT)
Dept: PAIN MEDICINE | Facility: HOSPITAL | Age: 88
DRG: 551 | End: 2023-03-13
Payer: MEDICARE

## 2023-03-13 ENCOUNTER — APPOINTMENT (OUTPATIENT)
Dept: GENERAL RADIOLOGY | Facility: HOSPITAL | Age: 88
DRG: 551 | End: 2023-03-13
Payer: MEDICARE

## 2023-03-13 ENCOUNTER — APPOINTMENT (OUTPATIENT)
Dept: CARDIOLOGY | Facility: HOSPITAL | Age: 88
DRG: 551 | End: 2023-03-13
Payer: MEDICARE

## 2023-03-13 ENCOUNTER — ANESTHESIA (OUTPATIENT)
Dept: PAIN MEDICINE | Facility: HOSPITAL | Age: 88
DRG: 551 | End: 2023-03-13
Payer: MEDICARE

## 2023-03-13 LAB
ANION GAP SERPL CALCULATED.3IONS-SCNC: 11.6 MMOL/L (ref 5–15)
BASOPHILS # BLD AUTO: 0.04 10*3/MM3 (ref 0–0.2)
BASOPHILS NFR BLD AUTO: 0.5 % (ref 0–1.5)
BH CV UPPER VENOUS LEFT INTERNAL JUGULAR AUGMENT: NORMAL
BH CV UPPER VENOUS LEFT INTERNAL JUGULAR COMPRESS: NORMAL
BH CV UPPER VENOUS LEFT INTERNAL JUGULAR PHASIC: NORMAL
BH CV UPPER VENOUS LEFT INTERNAL JUGULAR SPONT: NORMAL
BH CV UPPER VENOUS LEFT SUBCLAVIAN AUGMENT: NORMAL
BH CV UPPER VENOUS LEFT SUBCLAVIAN COMPRESS: NORMAL
BH CV UPPER VENOUS LEFT SUBCLAVIAN PHASIC: NORMAL
BH CV UPPER VENOUS LEFT SUBCLAVIAN SPONT: NORMAL
BH CV UPPER VENOUS RIGHT AXILLARY AUGMENT: NORMAL
BH CV UPPER VENOUS RIGHT AXILLARY COMPRESS: NORMAL
BH CV UPPER VENOUS RIGHT AXILLARY PHASIC: NORMAL
BH CV UPPER VENOUS RIGHT AXILLARY SPONT: NORMAL
BH CV UPPER VENOUS RIGHT BASILIC FOREARM COMPRESS: NORMAL
BH CV UPPER VENOUS RIGHT BASILIC UPPER COMPRESS: NORMAL
BH CV UPPER VENOUS RIGHT BRACHIAL COMPRESS: NORMAL
BH CV UPPER VENOUS RIGHT CEPHALIC FOREARM COMPRESS: NORMAL
BH CV UPPER VENOUS RIGHT CEPHALIC UPPER COMPRESS: NORMAL
BH CV UPPER VENOUS RIGHT INTERNAL JUGULAR AUGMENT: NORMAL
BH CV UPPER VENOUS RIGHT INTERNAL JUGULAR COMPRESS: NORMAL
BH CV UPPER VENOUS RIGHT INTERNAL JUGULAR PHASIC: NORMAL
BH CV UPPER VENOUS RIGHT INTERNAL JUGULAR SPONT: NORMAL
BH CV UPPER VENOUS RIGHT RADIAL COMPRESS: NORMAL
BH CV UPPER VENOUS RIGHT SUBCLAVIAN AUGMENT: NORMAL
BH CV UPPER VENOUS RIGHT SUBCLAVIAN COMPRESS: NORMAL
BH CV UPPER VENOUS RIGHT SUBCLAVIAN PHASIC: NORMAL
BH CV UPPER VENOUS RIGHT SUBCLAVIAN SPONT: NORMAL
BH CV UPPER VENOUS RIGHT ULNAR COMPRESS: NORMAL
BUN SERPL-MCNC: 62 MG/DL (ref 8–23)
BUN/CREAT SERPL: 42.5 (ref 7–25)
CALCIUM SPEC-SCNC: 8.9 MG/DL (ref 8.2–9.6)
CHLORIDE SERPL-SCNC: 97 MMOL/L (ref 98–107)
CO2 SERPL-SCNC: 19.4 MMOL/L (ref 22–29)
CREAT SERPL-MCNC: 1.46 MG/DL (ref 0.57–1)
DEPRECATED RDW RBC AUTO: 49.3 FL (ref 37–54)
EGFRCR SERPLBLD CKD-EPI 2021: 33.6 ML/MIN/1.73
EOSINOPHIL # BLD AUTO: 0.08 10*3/MM3 (ref 0–0.4)
EOSINOPHIL NFR BLD AUTO: 1.1 % (ref 0.3–6.2)
ERYTHROCYTE [DISTWIDTH] IN BLOOD BY AUTOMATED COUNT: 16.3 % (ref 12.3–15.4)
GLUCOSE BLDC GLUCOMTR-MCNC: 122 MG/DL (ref 70–130)
GLUCOSE BLDC GLUCOMTR-MCNC: 225 MG/DL (ref 70–130)
GLUCOSE BLDC GLUCOMTR-MCNC: 262 MG/DL (ref 70–130)
GLUCOSE BLDC GLUCOMTR-MCNC: 337 MG/DL (ref 70–130)
GLUCOSE SERPL-MCNC: 220 MG/DL (ref 65–99)
HCT VFR BLD AUTO: 30.4 % (ref 34–46.6)
HGB BLD-MCNC: 10.4 G/DL (ref 12–15.9)
LYMPHOCYTES # BLD AUTO: 2.19 10*3/MM3 (ref 0.7–3.1)
LYMPHOCYTES NFR BLD AUTO: 29.7 % (ref 19.6–45.3)
MAXIMAL PREDICTED HEART RATE: 128 BPM
MCH RBC QN AUTO: 29.3 PG (ref 26.6–33)
MCHC RBC AUTO-ENTMCNC: 34.2 G/DL (ref 31.5–35.7)
MCV RBC AUTO: 85.6 FL (ref 79–97)
MONOCYTES # BLD AUTO: 0.7 10*3/MM3 (ref 0.1–0.9)
MONOCYTES NFR BLD AUTO: 9.5 % (ref 5–12)
NEUTROPHILS NFR BLD AUTO: 4.25 10*3/MM3 (ref 1.7–7)
NEUTROPHILS NFR BLD AUTO: 57.7 % (ref 42.7–76)
OSMOLALITY UR: 291 MOSM/KG
PLATELET # BLD AUTO: 132 10*3/MM3 (ref 140–450)
PMV BLD AUTO: 11 FL (ref 6–12)
POTASSIUM SERPL-SCNC: 4.6 MMOL/L (ref 3.5–5.2)
RBC # BLD AUTO: 3.55 10*6/MM3 (ref 3.77–5.28)
SODIUM SERPL-SCNC: 128 MMOL/L (ref 136–145)
SODIUM UR-SCNC: 67 MMOL/L
STRESS TARGET HR: 109 BPM
URATE SERPL-MCNC: 8.4 MG/DL (ref 2.4–5.7)
WBC NRBC COR # BLD: 7.37 10*3/MM3 (ref 3.4–10.8)

## 2023-03-13 PROCEDURE — 93971 EXTREMITY STUDY: CPT

## 2023-03-13 PROCEDURE — 80048 BASIC METABOLIC PNL TOTAL CA: CPT | Performed by: HOSPITALIST

## 2023-03-13 PROCEDURE — 99232 SBSQ HOSP IP/OBS MODERATE 35: CPT | Performed by: INTERNAL MEDICINE

## 2023-03-13 PROCEDURE — 63710000001 PREDNISONE PER 5 MG: Performed by: INTERNAL MEDICINE

## 2023-03-13 PROCEDURE — 94664 DEMO&/EVAL PT USE INHALER: CPT

## 2023-03-13 PROCEDURE — 85025 COMPLETE CBC W/AUTO DIFF WBC: CPT | Performed by: HOSPITALIST

## 2023-03-13 PROCEDURE — 84300 ASSAY OF URINE SODIUM: CPT | Performed by: INTERNAL MEDICINE

## 2023-03-13 PROCEDURE — 83935 ASSAY OF URINE OSMOLALITY: CPT | Performed by: INTERNAL MEDICINE

## 2023-03-13 PROCEDURE — 94799 UNLISTED PULMONARY SVC/PX: CPT

## 2023-03-13 PROCEDURE — 82962 GLUCOSE BLOOD TEST: CPT

## 2023-03-13 PROCEDURE — 3E0R33Z INTRODUCTION OF ANTI-INFLAMMATORY INTO SPINAL CANAL, PERCUTANEOUS APPROACH: ICD-10-PCS | Performed by: ANESTHESIOLOGY

## 2023-03-13 PROCEDURE — 63710000001 INSULIN LISPRO (HUMAN) PER 5 UNITS: Performed by: INTERNAL MEDICINE

## 2023-03-13 PROCEDURE — 94761 N-INVAS EAR/PLS OXIMETRY MLT: CPT

## 2023-03-13 PROCEDURE — 84550 ASSAY OF BLOOD/URIC ACID: CPT | Performed by: INTERNAL MEDICINE

## 2023-03-13 PROCEDURE — 25010000002 METHYLPREDNISOLONE PER 80 MG: Performed by: ANESTHESIOLOGY

## 2023-03-13 RX ORDER — FENTANYL CITRATE 50 UG/ML
50 INJECTION, SOLUTION INTRAMUSCULAR; INTRAVENOUS ONCE
Status: DISCONTINUED | OUTPATIENT
Start: 2023-03-13 | End: 2023-03-14 | Stop reason: HOSPADM

## 2023-03-13 RX ORDER — LIDOCAINE HYDROCHLORIDE 10 MG/ML
1 INJECTION, SOLUTION INFILTRATION; PERINEURAL ONCE
Status: DISCONTINUED | OUTPATIENT
Start: 2023-03-13 | End: 2023-03-14 | Stop reason: HOSPADM

## 2023-03-13 RX ORDER — GUAIFENESIN 600 MG/1
1200 TABLET, EXTENDED RELEASE ORAL EVERY 12 HOURS SCHEDULED
Status: DISCONTINUED | OUTPATIENT
Start: 2023-03-13 | End: 2023-03-14 | Stop reason: HOSPADM

## 2023-03-13 RX ORDER — LIDOCAINE 50 MG/G
1 PATCH TOPICAL
Status: DISCONTINUED | OUTPATIENT
Start: 2023-03-13 | End: 2023-03-14 | Stop reason: HOSPADM

## 2023-03-13 RX ORDER — POLYETHYLENE GLYCOL 3350 17 G/17G
17 POWDER, FOR SOLUTION ORAL DAILY
Status: DISCONTINUED | OUTPATIENT
Start: 2023-03-13 | End: 2023-03-14 | Stop reason: HOSPADM

## 2023-03-13 RX ORDER — DOCUSATE SODIUM 100 MG/1
100 CAPSULE, LIQUID FILLED ORAL 2 TIMES DAILY
Status: DISCONTINUED | OUTPATIENT
Start: 2023-03-13 | End: 2023-03-14 | Stop reason: HOSPADM

## 2023-03-13 RX ORDER — DEXTROMETHORPHAN POLISTIREX 30 MG/5ML
60 SUSPENSION ORAL EVERY 12 HOURS SCHEDULED
Status: DISCONTINUED | OUTPATIENT
Start: 2023-03-13 | End: 2023-03-14 | Stop reason: HOSPADM

## 2023-03-13 RX ORDER — METHYLPREDNISOLONE ACETATE 80 MG/ML
80 INJECTION, SUSPENSION INTRA-ARTICULAR; INTRALESIONAL; INTRAMUSCULAR; SOFT TISSUE ONCE
Status: COMPLETED | OUTPATIENT
Start: 2023-03-13 | End: 2023-03-13

## 2023-03-13 RX ORDER — SODIUM CHLORIDE 0.9 % (FLUSH) 0.9 %
10 SYRINGE (ML) INJECTION AS NEEDED
Status: DISCONTINUED | OUTPATIENT
Start: 2023-03-13 | End: 2023-03-14 | Stop reason: HOSPADM

## 2023-03-13 RX ORDER — MIDAZOLAM HYDROCHLORIDE 1 MG/ML
1 INJECTION INTRAMUSCULAR; INTRAVENOUS ONCE AS NEEDED
Status: DISCONTINUED | OUTPATIENT
Start: 2023-03-13 | End: 2023-03-14 | Stop reason: HOSPADM

## 2023-03-13 RX ORDER — SODIUM CHLORIDE 0.9 % (FLUSH) 0.9 %
3 SYRINGE (ML) INJECTION EVERY 12 HOURS SCHEDULED
Status: DISCONTINUED | OUTPATIENT
Start: 2023-03-13 | End: 2023-03-14 | Stop reason: HOSPADM

## 2023-03-13 RX ADMIN — HYDRALAZINE HYDROCHLORIDE 50 MG: 50 TABLET ORAL at 09:36

## 2023-03-13 RX ADMIN — CETIRIZINE HYDROCHLORIDE 10 MG: 10 TABLET ORAL at 09:36

## 2023-03-13 RX ADMIN — GABAPENTIN 600 MG: 300 CAPSULE ORAL at 21:45

## 2023-03-13 RX ADMIN — INSULIN LISPRO 7 UNITS: 100 INJECTION, SOLUTION INTRAVENOUS; SUBCUTANEOUS at 17:28

## 2023-03-13 RX ADMIN — Medication 10 ML: at 21:45

## 2023-03-13 RX ADMIN — HYDROCODONE BITARTRATE AND ACETAMINOPHEN 1 TABLET: 7.5; 325 TABLET ORAL at 01:00

## 2023-03-13 RX ADMIN — CARVEDILOL 12.5 MG: 12.5 TABLET, FILM COATED ORAL at 21:45

## 2023-03-13 RX ADMIN — TAMSULOSIN HYDROCHLORIDE 0.4 MG: 0.4 CAPSULE ORAL at 09:36

## 2023-03-13 RX ADMIN — BUDESONIDE AND FORMOTEROL FUMARATE DIHYDRATE 2 PUFF: 160; 4.5 AEROSOL RESPIRATORY (INHALATION) at 20:21

## 2023-03-13 RX ADMIN — DOCUSATE SODIUM 50MG AND SENNOSIDES 8.6MG 2 TABLET: 8.6; 5 TABLET, FILM COATED ORAL at 21:45

## 2023-03-13 RX ADMIN — DEXTROMETHORPHAN 60 MG: 30 SUSPENSION, EXTENDED RELEASE ORAL at 21:45

## 2023-03-13 RX ADMIN — AMLODIPINE BESYLATE 2.5 MG: 2.5 TABLET ORAL at 09:36

## 2023-03-13 RX ADMIN — HYDROCODONE BITARTRATE AND ACETAMINOPHEN 1 TABLET: 7.5; 325 TABLET ORAL at 09:43

## 2023-03-13 RX ADMIN — GUAIFENESIN 1200 MG: 600 TABLET, EXTENDED RELEASE ORAL at 21:45

## 2023-03-13 RX ADMIN — Medication 10 ML: at 09:36

## 2023-03-13 RX ADMIN — MONTELUKAST SODIUM 10 MG: 10 TABLET, FILM COATED ORAL at 21:45

## 2023-03-13 RX ADMIN — LIDOCAINE 1 PATCH: 50 PATCH TOPICAL at 15:53

## 2023-03-13 RX ADMIN — HYDROCODONE BITARTRATE AND ACETAMINOPHEN 1 TABLET: 7.5; 325 TABLET ORAL at 15:53

## 2023-03-13 RX ADMIN — INSULIN LISPRO 4 UNITS: 100 INJECTION, SOLUTION INTRAVENOUS; SUBCUTANEOUS at 09:36

## 2023-03-13 RX ADMIN — GUAIFENESIN 1200 MG: 600 TABLET, EXTENDED RELEASE ORAL at 15:53

## 2023-03-13 RX ADMIN — Medication 1000 UNITS: at 09:36

## 2023-03-13 RX ADMIN — GABAPENTIN 600 MG: 300 CAPSULE ORAL at 09:36

## 2023-03-13 RX ADMIN — LIDOCAINE 1 PATCH: 50 PATCH CUTANEOUS at 21:45

## 2023-03-13 RX ADMIN — PREDNISONE 10 MG: 10 TABLET ORAL at 09:36

## 2023-03-13 RX ADMIN — DOCUSATE SODIUM 100 MG: 100 CAPSULE, LIQUID FILLED ORAL at 21:45

## 2023-03-13 RX ADMIN — HYDRALAZINE HYDROCHLORIDE 50 MG: 50 TABLET ORAL at 21:45

## 2023-03-13 RX ADMIN — INSULIN GLARGINE-YFGN 25 UNITS: 100 INJECTION, SOLUTION SUBCUTANEOUS at 21:45

## 2023-03-13 RX ADMIN — FUROSEMIDE 40 MG: 40 TABLET ORAL at 09:36

## 2023-03-13 RX ADMIN — METHYLPREDNISOLONE ACETATE 80 MG: 80 INJECTION, SUSPENSION INTRA-ARTICULAR; INTRALESIONAL; INTRAMUSCULAR; SOFT TISSUE at 13:01

## 2023-03-13 RX ADMIN — DEXTROMETHORPHAN 60 MG: 30 SUSPENSION, EXTENDED RELEASE ORAL at 15:53

## 2023-03-13 RX ADMIN — LEVOTHYROXINE SODIUM 50 MCG: 0.05 TABLET ORAL at 06:55

## 2023-03-13 RX ADMIN — BUDESONIDE AND FORMOTEROL FUMARATE DIHYDRATE 2 PUFF: 160; 4.5 AEROSOL RESPIRATORY (INHALATION) at 09:28

## 2023-03-13 RX ADMIN — Medication 3 ML: at 21:45

## 2023-03-13 RX ADMIN — HYDROCODONE BITARTRATE AND ACETAMINOPHEN 1 TABLET: 7.5; 325 TABLET ORAL at 21:45

## 2023-03-13 RX ADMIN — CARVEDILOL 12.5 MG: 12.5 TABLET, FILM COATED ORAL at 09:36

## 2023-03-13 RX ADMIN — LORAZEPAM 0.5 MG: 0.5 TABLET ORAL at 20:59

## 2023-03-13 NOTE — PROGRESS NOTES
Neurosurgery:  Patient off floor at present for Doppler right upper extremity due to swelling.  Daughter at bedside states finding of right rib fractures on CT chest.  She had lumbar epidural injection earlier but they have not seen her yet since the injection.  We will follow-up tomorrow on her response.

## 2023-03-13 NOTE — PROGRESS NOTES
[facility] INPATIENT PROGRESS NOTE    Length of Stay:  4 days    CHIEF COMPLAINT/REASON FOR VISIT:  Chronic ITP    SUBJECTIVE: Afebrile.  Normotensive.  On room air.  Epidural spinal injection completed today as platelets have improved. Still with pain.    ROS:  14 systems reviewed with pertinent positives and negatives in the HPI. Reviewed today.    OBJECTIVE:  Vitals:    03/13/23 1301 03/13/23 1306 03/13/23 1316 03/13/23 1326   BP: 130/60 165/63 144/56 135/55   BP Location: Left arm Left arm Left arm Left arm   Patient Position: Lying Sitting Lying Lying   Pulse: 60 54 55 54   Resp: 16 16 16 16   Temp:       TempSrc:       SpO2: 98% 98% 94% 96%   Weight:       Height:             PHYSICAL EXAMINATION:   General: NAD, awake/alert  Chest/Lungs: CTAB  Heart: Irregularly irregular  Abdomen/GI: soft, NT, ND, NABS  Extremities: WWP, 1+ BUE edema with purpura    DIAGNOSTIC DATA:  Results Review:     I reviewed the patient's new clinical results.    Results from last 7 days   Lab Units 03/13/23  0434   WBC 10*3/mm3 7.37   HEMOGLOBIN g/dL 10.4*   HEMATOCRIT % 30.4*   PLATELETS 10*3/mm3 132*     Lab Results   Component Value Date    NEUTROABS 4.25 03/13/2023     Results from last 7 days   Lab Units 03/13/23  0434   SODIUM mmol/L 128*   POTASSIUM mmol/L 4.6   CHLORIDE mmol/L 97*   CO2 mmol/L 19.4*   BUN mg/dL 62*   CREATININE mg/dL 1.46*   GLUCOSE mg/dL 220*   CALCIUM mg/dL 8.9                 IMAGING:    None reviewed    ASSESSMENT:  This is a 89 y.o. 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  • Currently on prednisone 10 mg daily.  • Platelet count is around baseline at 41,000  • Due to ongoing back pain an epidural injection is being considered.  • Anesthesiology would require platelet count to be greater than 100,000.    • Given IVIG to a total dose of 1 g/kg, 85 kg total from 3/9 through 3/11  • Platelets 132,000,  from 100,000     *Back pain and lower extremity pain  • Secondary to degenerative changes in the lumbar spine.  • Considering epidural injection.  • Require platelet count to be 100,000.  • Platelets at goal,  epidural injection today     *Acute on chronic diastolic congestive heart failure    *BUE edema  · Venous duplex negative for DVT     RECOMMENDATIONS:  1. No more IVIG required  2. Continue prednisone 10 mg daily  3. Follow-up is scheduled on 3/21    Will sign off as platelets are improving significantly. Call us back if needed.     Oziel Caruso MD

## 2023-03-13 NOTE — ANESTHESIA PROCEDURE NOTES
PAIN Epidural block      Patient reassessed immediately prior to procedure    Patient location during procedure: pain clinic  Indication:procedure for pain  Performed By  Anesthesiologist: Eddie Castanon MD  Preanesthetic Checklist  Completed: patient identified and risks and benefits discussed  Additional Notes  DIAGNOSIS:   Post-Op Diagnosis Codes:     * Lumbar radiculopathy (M54.16)    Sedation: none    Sedation time:    No dye due to kidney disease    A lumbar epidural steroid injection under fluoroscopic guidance was performed.  Under fluoroscopic guidance, the epidural space was identified and accessed, confirmed by loss of resistance to saline.  The above medications were injected uneventfully.    Prep:  Pt Position:prone  Sterile Tech:cap, gloves, mask and sterile barrier  Prep:chlorhexidine gluconate and isopropyl alcohol  Monitoring:blood pressure monitoring, continuous pulse oximetry and EKG  Procedure:Sedation: no     Approach:midline  Guidance: fluoroscopy  Location:lumbar  Interspace: L5-S1.  Needle Type:Tuohy  Needle Gauge:20  Aspiration:negative  Medications:  Preservative Free Saline:1mL  Comments:Depomedrol 80 mg  Post Assessment:  Pt Tolerance:patient tolerated the procedure well with no apparent complications  Complications:no

## 2023-03-13 NOTE — PLAN OF CARE
Goal Outcome Evaluation:           Progress: improving  Outcome Evaluation: vss, pt received epidural today, applied lidoderm to right ribs, right arm doppler negative for clot

## 2023-03-13 NOTE — SIGNIFICANT NOTE
03/13/23 1510   OTHER   Discipline physical therapy assistant   Rehab Time/Intention   Session Not Performed other (see comments);patient unavailable for treatment  (pt checked on AM and was going down to get an epidural for back pain. Checked PM and pt still off the floor. Will f/u with pt tomorrow.)   Recommendation   PT - Next Appointment 03/14/23

## 2023-03-13 NOTE — PROGRESS NOTES
" LOS: 4 days     Name: Helena Carmen  Age: 89 y.o.  Sex: female  :  1931  MRN: [id number]         Primary Care Physician: Mariah Hickman MD    Subjective   Subjective  Patient's main complaint is that of right anterior lateral rib pain along with low back pain.  Denies shortness of breath.  She does have a cough and has pain in the right lateral ribs with deep breaths.    Objective   Vital Signs  Temp:  [97.6 °F (36.4 °C)-97.8 °F (36.6 °C)] 97.7 °F (36.5 °C)  Heart Rate:  [51-62] 53  Resp:  [16] 16  BP: (142-161)/(48-75) 142/61  Body mass index is 35.82 kg/m².    Objective:  General Appearance:  Comfortable and in no acute distress.    Vital signs: (most recent): Blood pressure 142/61, pulse 53, temperature 97.7 °F (36.5 °C), temperature source Oral, resp. rate 16, height 144.8 cm (57.01\"), weight 75.1 kg (165 lb 9.1 oz), SpO2 100 %.    Lungs:  Normal effort and normal respiratory rate.    Heart: Normal rate.  Regular rhythm.    Chest: Chest wall tenderness present.  (She is point tender around the right anterior lateral sixth rib)  Abdomen: Abdomen is soft.  (Morbidly obese).  Bowel sounds are normal.   There is no abdominal tenderness.     Extremities: There is local swelling.  There is no dependent edema.  (Right arm swelling with dependent erythema.  No warmth)  Neurological: Patient is alert and oriented to person, place and time.    Skin:  Warm and dry.            Results Review:       I reviewed the patient's new clinical results.    Results from last 7 days   Lab Units 23  0434 23  0704 23  0654 03/10/23  0523 23  0525 23  1719 23  0621 23  1153   WBC 10*3/mm3 7.37 5.87 4.58 4.77 6.44  --  6.02 9.18   HEMOGLOBIN g/dL 10.4* 10.8* 10.9* 11.1* 12.4  --  12.4 13.2   PLATELETS 10*3/mm3 132* 100* 77* 56* 41* 38* 40* 41*     Results from last 7 days   Lab Units 23  0434 23  0704 23  0654 03/10/23  0523 23  0525 23  0621 " 03/07/23  1153   SODIUM mmol/L 128* 129* 134* 133* 136 139 137   POTASSIUM mmol/L 4.6 4.4 4.3 4.3 4.3 4.4 4.4   CHLORIDE mmol/L 97* 99 104 102 102 104 102   CO2 mmol/L 19.4* 23.0 21.4* 20.9* 23.7 24.0 25.1   BUN mg/dL 62* 51* 41* 44* 42* 34* 31*   CREATININE mg/dL 1.46* 1.33* 1.36* 1.67* 1.73* 1.54* 1.50*   CALCIUM mg/dL 8.9 8.3 8.3 8.0* 8.0* 8.1* 8.8   GLUCOSE mg/dL 220* 244* 170* 203* 220* 191* 79                 Scheduled Meds:   amLODIPine, 2.5 mg, Oral, Daily  budesonide-formoterol, 2 puff, Inhalation, BID - RT  carvedilol, 12.5 mg, Oral, BID  cetirizine, 10 mg, Oral, Daily  cholecalciferol, 1,000 Units, Oral, Daily  dextromethorphan polistirex ER, 60 mg, Oral, Q12H  docusate sodium, 100 mg, Oral, BID  furosemide, 40 mg, Oral, Daily  gabapentin, 600 mg, Oral, Q12H  guaiFENesin, 1,200 mg, Oral, Q12H  hydrALAZINE, 50 mg, Oral, BID  HYDROcodone-acetaminophen, 1 tablet, Oral, Q6H  insulin glargine, 20 Units, Subcutaneous, Nightly  insulin lispro, 0-9 Units, Subcutaneous, TID AC  levothyroxine, 50 mcg, Oral, Q AM  lidocaine, 1 patch, Transdermal, Q24H  lidocaine, 1 patch, Transdermal, Q24H  montelukast, 10 mg, Oral, Nightly  polyethylene glycol, 17 g, Oral, Daily  predniSONE, 10 mg, Oral, Daily With Breakfast  senna-docusate sodium, 2 tablet, Oral, Nightly  sodium chloride, 10 mL, Intravenous, Q12H  tamsulosin, 0.4 mg, Oral, Daily      PRN Meds:   •  acetaminophen **OR** acetaminophen **OR** acetaminophen  •  bisacodyl  •  dextrose  •  dextrose  •  diphenhydrAMINE  •  famotidine  •  glucagon (human recombinant)  •  hydrocortisone sodium succinate  •  HYDROmorphone  •  LORazepam  •  methocarbamol  •  nitroglycerin  •  ondansetron  •  ondansetron  •  [COMPLETED] Insert Peripheral IV **AND** sodium chloride  •  sodium chloride  •  sodium chloride  Continuous Infusions:       Assessment & Plan   Active Hospital Problems    Diagnosis  POA   • **Bilateral low back pain without sciatica, unspecified chronicity [M54.50]   Yes   • Acute left-sided low back pain with left-sided sciatica [M54.42]  Unknown   • Chronic ITP (idiopathic thrombocytopenia) ([facility]) [D69.3]  Yes   • Closed fracture of fifth lumbar vertebra ([facility]) [S32.059A]  Yes   • Hypothyroidism [E03.9]  Yes   • Chronic pain disorder [G89.4]  Yes   • Macular degeneration [H35.30]  Yes   • Type 2 diabetes mellitus with hyperglycemia ([facility]) [E11.65]  Yes   • Paroxysmal atrial fibrillation ([facility]) [I48.0]  Yes   • Essential hypertension [I10]  Yes   • Legally blind [H54.8]  Yes   • Stage 4 chronic kidney disease ([facility]) [N18.4]  Yes      Resolved Hospital Problems   No resolved problems to display.       Assessment & Plan    -Platelet count now improved and she will go for epidural steroid injection today  -Appears to have buckled appearance and possible fracture around the anterior lateral sixth rib.  Will apply Lidoderm patch, continue systemic pain control, add gentle cough medicine along with Mucinex and incentive spirometer.  -Thrombocytopenia as per oncology  -Blood sugars uncontrolled and I will intensify her insulin regimen today  -Renal function stable and at baseline.  Hyponatremia noted.  Will order additional studies.  Place Lasix on hold for now.  -Right upper extremity Doppler pending for further evaluation of swelling and erythema    Dispo  TBD    Ovidio Anguiano MD  [facility]  03/13/23  12:23 EDT

## 2023-03-13 NOTE — PROGRESS NOTES
Received a call from nursing regarding blood glucose of 417.  Chart review performed.  Patient received 8 units of insulin lispro at 1810.  Patient is due for 20 units of Lantus now.  Instructed bedside RN to go ahead and administer Lantus now and repeat blood glucose at 10 PM.  No further short acting insulin at this juncture.  We will continue to follow.

## 2023-03-13 NOTE — H&P
INTERVAL HISTORY:    She was admitted for back pain after increasing low back pain radiating into her legs and hips.  She was having difficulty ambulating as well.  She has ITP and her platelets are finally above 100,000 today.  Conservative therapy has been Norco gabapentin.  No current facility-administered medications on file prior to encounter.     Current Outpatient Medications on File Prior to Encounter   Medication Sig Dispense Refill   • amLODIPine (NORVASC) 2.5 MG tablet Take 1 tablet by mouth Daily. 30 tablet 11   • carvedilol (COREG) 12.5 MG tablet Take 1 tablet by mouth 2 (Two) Times a Day. 60 tablet 11   • cetirizine (zyrTEC) 5 MG tablet Take 1 tablet by mouth Daily As Needed for Allergies.     • cholecalciferol (VITAMIN D3) 25 MCG (1000 UT) tablet Take 1 tablet by mouth Daily.     • fluticasone-salmeterol (Advair HFA) 115-21 MCG/ACT inhaler Inhale 2 puffs 2 (Two) Times a Day. 12 each 11   • furosemide (LASIX) 40 MG tablet Take 1 tablet by mouth Daily for 30 days. 30 tablet 0   • gabapentin (NEURONTIN) 600 MG tablet 1 tablet 2 (Two) Times a Day.     • hydrALAZINE (APRESOLINE) 25 MG tablet Take 2 tablets by mouth 2 (Two) Times a Day.     • HYDROcodone-acetaminophen (NORCO) 7.5-325 MG per tablet Take 1 tablet by mouth Every 6 (Six) Hours.     • insulin glargine (LANTUS, SEMGLEE) 100 UNIT/ML injection Inject 20 Units under the skin into the appropriate area as directed Every Night.     • insulin lispro (humaLOG) 100 UNIT/ML injection Inject 0-10 Units under the skin into the appropriate area as directed 3 (Three) Times a Day Before Meals. 2 units for every 50 > 150     • levothyroxine (SYNTHROID, LEVOTHROID) 25 MCG tablet Take 2 tablets by mouth Daily.     • lidocaine (LIDODERM) 5 % Place 1 patch on the skin as directed by provider Daily. Remove & Discard patch within 12 hours or as directed by MD 6 each 0   • Loratadine (CLARITIN PO) Take  by mouth.     • LORazepam (ATIVAN) 0.5 MG tablet Take 1 tablet by  "mouth Every 8 (Eight) Hours As Needed for Anxiety. 10 tablet 0   • methocarbamol (ROBAXIN) 750 MG tablet Take 1 tablet by mouth Every 8 (Eight) Hours As Needed for Muscle Spasms.     • montelukast (SINGULAIR) 10 MG tablet Take 1 tablet by mouth Every Night.     • ondansetron (ZOFRAN) 8 MG tablet Take 1 tablet by mouth As Needed.     • polyethylene glycol (MIRALAX) 17 GM/SCOOP powder Take 17 g by mouth Daily.     • predniSONE (DELTASONE) 5 MG tablet Take 2 tablets by mouth Daily With Breakfast. 60 tablet 1   • tamsulosin (FLOMAX) 0.4 MG capsule 24 hr capsule Take 1 capsule by mouth every night at bedtime.         Past Medical History:   Diagnosis Date   • Aortic valve stenosis     s/p tissue AVR   • Back pain    • CKD (chronic kidney disease)    • Colitis due to Clostridioides difficile 01/26/2022   • Diastolic dysfunction     Grade 2 per echocardiogram 2013   • Diverticulosis    • Exertional shortness of breath    • Heart disease    • Hiatal hernia    • Hyperlipidemia    • Hypertension    • Hyperthyroidism    • Hypertriglyceridemia 05/31/2018   • Hypothyroidism    • L5 compression fracture (HCC) 08/2022   • Left ventricular hypertrophy    • Legally blind    • Liver disease    • Low back pain    • Macular degeneration    • Mitral regurgitation    • Osteoarthritis of hip    • Osteoporosis    • Pancreatitis 01/26/2022   • Paroxysmal atrial fibrillation (HCC)    • Peripheral neuropathy    • Premature ventricular contractions    • Pulmonary hypertension (HCC)    • Renal insufficiency syndrome    • Type 2 diabetes mellitus (HCC)    • Uterine cancer (HCC)        No hematologic infectious or constitutional symptoms  Negative screen for HARJIT      Exam:  /58 (BP Location: Left arm, Patient Position: Lying)   Pulse 52   Temp 36.5 °C (97.7 °F) (Oral)   Resp 16   Ht 144.8 cm (57.01\")   Wt 75.1 kg (165 lb 9.1 oz)   SpO2 100%   BMI 35.82 kg/m²   Airway Mallampatti 2  Alert and oriented      Diagnosis:  Post-Op " Diagnosis Codes:     * Lumbar radiculopathy [M54.16]    Plan:  Lumbar 5 epidural steroid injection under fluoroscopic guidance    I have encouraged them to continue:  1.  Physical therapy exercises at home as prescribed by physical therapy or from the pain clinic handout.  Continuation of these exercises every day, or multiple times per week, even when the patient has good pain relief, was stressed to the patient as a preventative measure to decrease the frequency and severity of future pain episodes.  2.  Continue pain medicines as already prescribed.  If patient not currently taking any, it is recommended to begin Acetaminophen 1000 mg po q 8 hours.  If other medicines containing Acetaminophen are currently prescribed, maintain daily dose at 3000mg.    3.  If they can tolerate NSAIDS, it is recommended to take Ibuprofen 600 mg po q 6 hours for 7 days during pain exacerbations.   Alternatively, they may substitute an NSAID of their choice (e.g. Aleve)  4.  Heat and ice to the affected area as tolerated for pain control.   5.  Low impact exercise such as walking or water exercise was recommended to maintain overall health and aid in weight control.   6.  Follow up as needed for subsequent injections.

## 2023-03-13 NOTE — PLAN OF CARE
Problem: Skin Injury Risk Increased  Goal: Skin Health and Integrity  Outcome: Ongoing, Progressing     Problem: Fall Injury Risk  Goal: Absence of Fall and Fall-Related Injury  Outcome: Ongoing, Progressing     Problem: Adult Inpatient Plan of Care  Goal: Plan of Care Review  Outcome: Ongoing, Progressing  Goal: Patient-Specific Goal (Individualized)  Outcome: Ongoing, Progressing  Goal: Absence of Hospital-Acquired Illness or Injury  Outcome: Ongoing, Progressing  Goal: Optimal Comfort and Wellbeing  Outcome: Ongoing, Progressing  Goal: Readiness for Transition of Care  Outcome: Ongoing, Progressing   Goal Outcome Evaluation:

## 2023-03-14 VITALS
BODY MASS INDEX: 35.72 KG/M2 | HEIGHT: 57 IN | WEIGHT: 165.57 LBS | DIASTOLIC BLOOD PRESSURE: 58 MMHG | RESPIRATION RATE: 18 BRPM | HEART RATE: 59 BPM | OXYGEN SATURATION: 100 % | SYSTOLIC BLOOD PRESSURE: 118 MMHG | TEMPERATURE: 98 F

## 2023-03-14 PROBLEM — S22.39XA RIB FRACTURE: Status: ACTIVE | Noted: 2023-03-14

## 2023-03-14 LAB
ANION GAP SERPL CALCULATED.3IONS-SCNC: 13 MMOL/L (ref 5–15)
BUN SERPL-MCNC: 80 MG/DL (ref 8–23)
BUN/CREAT SERPL: 45.2 (ref 7–25)
CALCIUM SPEC-SCNC: 8.2 MG/DL (ref 8.2–9.6)
CHLORIDE SERPL-SCNC: 99 MMOL/L (ref 98–107)
CO2 SERPL-SCNC: 19 MMOL/L (ref 22–29)
CREAT SERPL-MCNC: 1.77 MG/DL (ref 0.57–1)
DEPRECATED RDW RBC AUTO: 49.4 FL (ref 37–54)
EGFRCR SERPLBLD CKD-EPI 2021: 26.7 ML/MIN/1.73
ERYTHROCYTE [DISTWIDTH] IN BLOOD BY AUTOMATED COUNT: 16.7 % (ref 12.3–15.4)
GLUCOSE BLDC GLUCOMTR-MCNC: 270 MG/DL (ref 70–130)
GLUCOSE SERPL-MCNC: 246 MG/DL (ref 65–99)
HCT VFR BLD AUTO: 31 % (ref 34–46.6)
HGB BLD-MCNC: 10.5 G/DL (ref 12–15.9)
MCH RBC QN AUTO: 28.5 PG (ref 26.6–33)
MCHC RBC AUTO-ENTMCNC: 33.9 G/DL (ref 31.5–35.7)
MCV RBC AUTO: 84.2 FL (ref 79–97)
OSMOLALITY SERPL: 322 MOSM/KG (ref 280–301)
PLATELET # BLD AUTO: 160 10*3/MM3 (ref 140–450)
PMV BLD AUTO: 11.2 FL (ref 6–12)
POTASSIUM SERPL-SCNC: 4.4 MMOL/L (ref 3.5–5.2)
RBC # BLD AUTO: 3.68 10*6/MM3 (ref 3.77–5.28)
SODIUM SERPL-SCNC: 131 MMOL/L (ref 136–145)
WBC NRBC COR # BLD: 9.68 10*3/MM3 (ref 3.4–10.8)

## 2023-03-14 PROCEDURE — 82962 GLUCOSE BLOOD TEST: CPT

## 2023-03-14 PROCEDURE — 94799 UNLISTED PULMONARY SVC/PX: CPT

## 2023-03-14 PROCEDURE — 85027 COMPLETE CBC AUTOMATED: CPT | Performed by: INTERNAL MEDICINE

## 2023-03-14 PROCEDURE — 83930 ASSAY OF BLOOD OSMOLALITY: CPT | Performed by: INTERNAL MEDICINE

## 2023-03-14 PROCEDURE — 94664 DEMO&/EVAL PT USE INHALER: CPT

## 2023-03-14 PROCEDURE — 94761 N-INVAS EAR/PLS OXIMETRY MLT: CPT

## 2023-03-14 PROCEDURE — 63710000001 PREDNISONE PER 5 MG: Performed by: INTERNAL MEDICINE

## 2023-03-14 PROCEDURE — 63710000001 INSULIN LISPRO (HUMAN) PER 5 UNITS: Performed by: INTERNAL MEDICINE

## 2023-03-14 PROCEDURE — 97116 GAIT TRAINING THERAPY: CPT

## 2023-03-14 PROCEDURE — 80048 BASIC METABOLIC PNL TOTAL CA: CPT | Performed by: INTERNAL MEDICINE

## 2023-03-14 RX ORDER — DEXTROMETHORPHAN POLISTIREX 30 MG/5ML
60 SUSPENSION ORAL EVERY 12 HOURS SCHEDULED
Qty: 280 ML
Start: 2023-03-14 | End: 2023-03-17

## 2023-03-14 RX ORDER — LEVOTHYROXINE SODIUM 0.05 MG/1
50 TABLET ORAL
Start: 2023-03-15

## 2023-03-14 RX ORDER — INSULIN LISPRO 100 [IU]/ML
0-9 INJECTION, SOLUTION INTRAVENOUS; SUBCUTANEOUS
Refills: 12
Start: 2023-03-14

## 2023-03-14 RX ORDER — GUAIFENESIN 600 MG/1
1200 TABLET, EXTENDED RELEASE ORAL EVERY 12 HOURS SCHEDULED
Start: 2023-03-14 | End: 2023-04-07 | Stop reason: HOSPADM

## 2023-03-14 RX ORDER — PSEUDOEPHEDRINE HCL 30 MG
100 TABLET ORAL 2 TIMES DAILY
Status: ON HOLD
Start: 2023-03-14 | End: 2023-04-04

## 2023-03-14 RX ORDER — AMOXICILLIN 250 MG
2 CAPSULE ORAL NIGHTLY
Status: ON HOLD
Start: 2023-03-14 | End: 2023-04-04

## 2023-03-14 RX ORDER — ACETAMINOPHEN 325 MG/1
650 TABLET ORAL EVERY 4 HOURS PRN
Start: 2023-03-14 | End: 2023-04-07 | Stop reason: HOSPADM

## 2023-03-14 RX ORDER — LORAZEPAM 0.5 MG/1
0.5 TABLET ORAL EVERY 8 HOURS PRN
Qty: 9 TABLET | Refills: 0 | Status: SHIPPED | OUTPATIENT
Start: 2023-03-14

## 2023-03-14 RX ORDER — GABAPENTIN 600 MG/1
600 TABLET ORAL 2 TIMES DAILY
Qty: 6 TABLET | Refills: 0 | Status: SHIPPED | OUTPATIENT
Start: 2023-03-14

## 2023-03-14 RX ORDER — HYDROCODONE BITARTRATE AND ACETAMINOPHEN 7.5; 325 MG/1; MG/1
1 TABLET ORAL EVERY 6 HOURS
Qty: 12 TABLET | Refills: 0 | Status: SHIPPED | OUTPATIENT
Start: 2023-03-14

## 2023-03-14 RX ADMIN — BUDESONIDE AND FORMOTEROL FUMARATE DIHYDRATE 2 PUFF: 160; 4.5 AEROSOL RESPIRATORY (INHALATION) at 08:52

## 2023-03-14 RX ADMIN — CETIRIZINE HYDROCHLORIDE 10 MG: 10 TABLET ORAL at 08:58

## 2023-03-14 RX ADMIN — LIDOCAINE 1 PATCH: 50 PATCH TOPICAL at 08:58

## 2023-03-14 RX ADMIN — INSULIN LISPRO 9 UNITS: 100 INJECTION, SOLUTION INTRAVENOUS; SUBCUTANEOUS at 12:59

## 2023-03-14 RX ADMIN — INSULIN LISPRO 6 UNITS: 100 INJECTION, SOLUTION INTRAVENOUS; SUBCUTANEOUS at 08:56

## 2023-03-14 RX ADMIN — GABAPENTIN 600 MG: 300 CAPSULE ORAL at 08:57

## 2023-03-14 RX ADMIN — POLYETHYLENE GLYCOL 3350 17 G: 17 POWDER, FOR SOLUTION ORAL at 10:22

## 2023-03-14 RX ADMIN — LEVOTHYROXINE SODIUM 50 MCG: 0.05 TABLET ORAL at 05:00

## 2023-03-14 RX ADMIN — Medication 1000 UNITS: at 08:58

## 2023-03-14 RX ADMIN — Medication 10 ML: at 08:59

## 2023-03-14 RX ADMIN — AMLODIPINE BESYLATE 2.5 MG: 2.5 TABLET ORAL at 08:58

## 2023-03-14 RX ADMIN — CARVEDILOL 12.5 MG: 12.5 TABLET, FILM COATED ORAL at 08:58

## 2023-03-14 RX ADMIN — Medication 3 ML: at 09:05

## 2023-03-14 RX ADMIN — DOCUSATE SODIUM 100 MG: 100 CAPSULE, LIQUID FILLED ORAL at 08:57

## 2023-03-14 RX ADMIN — TAMSULOSIN HYDROCHLORIDE 0.4 MG: 0.4 CAPSULE ORAL at 08:58

## 2023-03-14 RX ADMIN — HYDROCODONE BITARTRATE AND ACETAMINOPHEN 1 TABLET: 7.5; 325 TABLET ORAL at 10:23

## 2023-03-14 RX ADMIN — PREDNISONE 10 MG: 10 TABLET ORAL at 08:58

## 2023-03-14 RX ADMIN — GUAIFENESIN 1200 MG: 600 TABLET, EXTENDED RELEASE ORAL at 08:58

## 2023-03-14 RX ADMIN — DEXTROMETHORPHAN 60 MG: 30 SUSPENSION, EXTENDED RELEASE ORAL at 08:59

## 2023-03-14 RX ADMIN — HYDROCODONE BITARTRATE AND ACETAMINOPHEN 1 TABLET: 7.5; 325 TABLET ORAL at 05:00

## 2023-03-14 RX ADMIN — HYDRALAZINE HYDROCHLORIDE 50 MG: 50 TABLET ORAL at 08:58

## 2023-03-14 NOTE — PLAN OF CARE
Goal Outcome Evaluation:  Plan of Care Reviewed With: (P) patient        Progress: (P) improving  Outcome Evaluation: (P) Pt agreed to participate in therapy this AM. Pt requires a back brace when she is sitting or standing. Pt has a kyphotic posture with ambulation at her baseline. She ambulated 15' and wanted to sit up in the chair this morning. Pt is CGA for her bed mobility and Deandre for her STS. Pt would benefit from continued therapy to improve her mobility and strength.    ..Patient was not wearing a face mask during this therapy encounter. Therapist used appropriate personal protective equipment including eye protection, mask, and gloves.  Mask used was standard procedure mask. Appropriate PPE was worn during the entire therapy session. Hand hygiene was completed before and after therapy session. Patient is not in enhanced droplet precautions.

## 2023-03-14 NOTE — THERAPY TREATMENT NOTE
Patient Name: Helena Carmen  : 1931    MRN: [id number]                              Today's Date: 3/14/2023       Admit Date: 3/7/2023    Visit Dx:     ICD-10-CM ICD-9-CM   1. Acute on chronic bilateral low back pain  M54.50 724.2   2. Unable to walk  R26.2 719.7   3. Generalized weakness  R53.1 780.79   4. Elevated troponin  R77.8 790.6   5. Thrombocytopenia (HCC)  D69.6 287.5   6. History of ITP  Z86.2 V12.3   7. Chronic kidney disease, unspecified CKD stage  N18.9 585.9   8. Closed compression fracture of L5 lumbar vertebra, with delayed healing, subsequent encounter  S32.050G V54.17     Patient Active Problem List   Diagnosis   • Aortic valve stenosis   • Fatigue   • MI (mitral incompetence)   • PVC (premature ventricular contraction)   • Legally blind   • Essential hypertension   • Hypertriglyceridemia   • Pulmonary hypertension (HCC)   • Paroxysmal atrial fibrillation (HCC)   • Anxiety   • Stage 4 chronic kidney disease (HCC)   • Chronic pain disorder   • Degeneration of lumbar intervertebral disc   • Type 2 diabetes mellitus with hyperglycemia (HCC)   • Esophageal reflux   • Gastroparesis   • Hiatal hernia   • Iatrogenic hypothyroidism   • Macular degeneration   • Malignant neoplasm of uterus (HCC)   • Osteoarthritis of knee   • Peripheral nerve disease   • Secondary hyperparathyroidism (HCC)   • Thrombocytopenia (HCC)   • Chronic heart failure with preserved ejection fraction (HCC)   • Other cirrhosis of liver (HCC)   • Hypothyroidism   • Nonrheumatic tricuspid valve regurgitation   • Anemia, chronic disease   • Hyponatremia   • Closed fracture of fifth lumbar vertebra (HCC)   • Closed fracture of fifth lumbar vertebra, unspecified fracture morphology, initial encounter ([facility])   • Diabetic polyneuropathy associated with type 2 diabetes mellitus (HCC)   • Chronic ITP (idiopathic thrombocytopenia) (HCC)   • Chronic midline low back pain with bilateral sciatica   • Acute deep vein thrombosis of calf,  bilateral (HCC)   • Bilateral low back pain without sciatica, unspecified chronicity   • Acute left-sided low back pain with left-sided sciatica   • Urine retention     Past Medical History:   Diagnosis Date   • Aortic valve stenosis     s/p tissue AVR   • Back pain    • CKD (chronic kidney disease)    • Colitis due to Clostridioides difficile 01/26/2022   • Diastolic dysfunction     Grade 2 per echocardiogram 2013   • Diverticulosis    • Exertional shortness of breath    • Heart disease    • Hiatal hernia    • Hyperlipidemia    • Hypertension    • Hyperthyroidism    • Hypertriglyceridemia 05/31/2018   • Hypothyroidism    • L5 compression fracture (HCC) 08/2022   • Left ventricular hypertrophy    • Legally blind    • Liver disease    • Low back pain    • Macular degeneration    • Mitral regurgitation    • Osteoarthritis of hip    • Osteoporosis    • Pancreatitis 01/26/2022   • Paroxysmal atrial fibrillation (HCC)    • Peripheral neuropathy    • Premature ventricular contractions    • Pulmonary hypertension (HCC)    • Renal insufficiency syndrome    • Type 2 diabetes mellitus (HCC)    • Uterine cancer (HCC)      Past Surgical History:   Procedure Laterality Date   • AORTIC VALVE REPAIR/REPLACEMENT     • CATARACT EXTRACTION      1970, 1999   • CHOLECYSTECTOMY     • ENDOSCOPY  08/15/2014    no gross lesions in stomach/duodenum, erythrematous mucosa in stomach   • EPIDURAL BLOCK     • HYSTERECTOMY  2007   • STERNOTOMY        General Information     Row Name 03/14/23 0937          Physical Therapy Time and Intention    Document Type therapy note (daily note) (P)   -MM     Mode of Treatment physical therapy (P)   -MM     Row Name 03/14/23 0937          Cognition    Orientation Status (Cognition) oriented x 3 (P)   -MM     Row Name 03/14/23 0937          Safety Issues, Functional Mobility    Impairments Affecting Function (Mobility) endurance/activity tolerance;strength;shortness of breath;postural/trunk control;range of  motion (ROM) (P)   -MM     Comment, Safety Issues/Impairments (Mobility) Pt has inc kyphosis (P)   -MM           User Key  (r) = Recorded By, (t) = Taken By, (c) = Cosigned By    Initials Name Provider Type    Farhana Tarango PTA Student PTA Student               Mobility     Row Name 03/14/23 0939          Bed Mobility    Supine-Sit [place] (Bed Mobility) verbal cues;standby assist;1 person assist (P)   -MM     Assistive Device (Bed Mobility) bed rails;head of bed elevated (P)   -MM     Row Name 03/14/23 0939          Bed-Chair Transfer    Bed-Chair [place] (Transfers) contact guard;1 person assist (P)   -MM     Assistive Device (Bed-Chair Transfers) walker, front-wheeled (P)   -MM     Row Name 03/14/23 0939          Sit-Stand Transfer    Sit-Stand [place] (Transfers) minimum assist (75% patient effort) (P)   -MM     Assistive Device (Sit-Stand Transfers) walker, front-wheeled (P)   -MM     Row Name 03/14/23 0939          Gait/Stairs (Locomotion)    [place] Level (Gait) contact guard (P)   -MM     Assistive Device (Gait) walker, front-wheeled (P)   -MM     Distance in Feet (Gait) 15ft (P)   -MM     Deviations/Abnormal Patterns (Gait) festinating/shuffling;paul decreased;gait speed decreased;stride length decreased;base of support, narrow (P)   -MM     Bilateral Gait Deviations forward flexed posture (P)   -MM           User Key  (r) = Recorded By, (t) = Taken By, (c) = Cosigned By    Initials Name Provider Type    Farhana Tarango PTA Student PTA Student               Obj/Interventions    No documentation.                Goals/Plan    No documentation.                Clinical Impression     Row Name 03/14/23 0902          Plan of Care Review    Outcome Evaluation Pt agreed to participate in therapy this AM. Pt requires a back brace when she is sitting or standing, Pt has increased kyphosis during ambulation. She ambulated 15' and wanted to sit up in the chair this morning. Pt is CGA for  her bed mobility and Deandre for her STS. Pt would benefit from continued therapy to improve her mobility and strength. (P)   -MM     Row Name 03/14/23 0941          Therapy Assessment/Plan (PT)    Therapy Frequency (PT) 5 times/wk (P)   -MM     Row Name 03/14/23 0941          Positioning and Restraints    Pre-Treatment Position in bed (P)   -MM     Post Treatment Position chair (P)   -MM     In Chair reclined;call light within reach;encouraged to call for assist;exit alarm on (P)   -MM           User Key  (r) = Recorded By, (t) = Taken By, (c) = Cosigned By    Initials Name Provider Type    Farhana Tarango, PTA Student PTA Student               Outcome Measures     Row Name 03/14/23 0945          How much help from another person do you currently need...    Turning from your back to your side while in flat bed without using bedrails? 2 (P)   -MM     Moving from lying on back to sitting on the side of a flat bed without bedrails? 2 (P)   -MM     Moving to and from a bed to a chair (including a wheelchair)? 3 (P)   -MM     Standing up from a chair using your arms (e.g., wheelchair, bedside chair)? 3 (P)   -MM     Climbing 3-5 steps with a railing? 1 (P)   -MM     To walk in hospital room? 2 (P)   -MM     AM-PAC 6 Clicks Score (PT) 13 (P)   -MM     Highest level of mobility 4 --> Transferred to chair/commode (P)   -MM           User Key  (r) = Recorded By, (t) = Taken By, (c) = Cosigned By    Initials Name Provider Type    Farhana Tarango, PTA Student PTA Student                             Physical Therapy Education     Title: PT OT SLP Therapies (In Progress)     Topic: Physical Therapy (In Progress)     Point: Mobility training (Done)     Learning Progress Summary           Patient Acceptance, E, VU by LAURIE at 3/11/2023 1044    Acceptance, E, VU by LAURIE at 3/8/2023 1030                   Point: Home exercise program (Not Started)     Learner Progress:  Not documented in this visit.          Point: Body mechanics  (Done)     Learning Progress Summary           Patient Acceptance, E,TB, VU by MM at 3/14/2023 0947    Acceptance, E, VU by EL at 3/11/2023 1044    Acceptance, E, VU by EL at 3/8/2023 1030                   Point: Precautions (Not Started)     Learner Progress:  Not documented in this visit.                      User Key     Initials Effective Dates Name Provider Type Discipline     11/16/21 -  Teresa Hoyos, PT Physical Therapist PT     02/24/23 -  Farhana Najera PTA Student PTA Student PT              PT Recommendation and Plan     Outcome Evaluation: (P) Pt agreed to participate in therapy this AM. Pt requires a back brace when she is sitting or standing, Pt has increased kyphosis during ambulation. She ambulated 15' and wanted to sit up in the chair this morning. Pt is CGA for her bed mobility and Deandre for her STS. Pt would benefit from continued therapy to improve her mobility and strength.     Time Calculation:    PT Charges     Row Name 03/14/23 0937             Time Calculation    Start Time 0821 (P)   -MM      Stop Time 0840 (P)   -MM      Time Calculation (min) 19 min (P)   -MM      PT Received On 03/14/23 (P)   -MM      PT - Next Appointment 03/15/23 (P)   -MM         Time Calculation- PT    Total Timed Code Minutes- PT 19 minute(s) (P)   -MM            User Key  (r) = Recorded By, (t) = Taken By, (c) = Cosigned By    Initials Name Provider Type     Farhana Najera PTA Student PTA Student              Therapy Charges for Today     Code Description Service Date Service Provider Modifiers Qty    [id number] HC GAIT TRAINING EA 15 MIN 3/14/2023 Farhana Najera PTA Student GP 1          PT G-Codes  Outcome Measure Options: AM-PAC 6 Clicks Basic Mobility (PT)  AM-PAC 6 Clicks Score (PT): (P) 13  AM-PAC 6 Clicks Score (OT): 16  Modified Iesha Scale: 4 - Moderately severe disability.  Unable to walk without assistance, and unable to attend to own bodily needs without assistance.       Farhana Najera  PTA Student  3/14/2023

## 2023-03-14 NOTE — PLAN OF CARE
Goal Outcome Evaluation:  Plan of Care Reviewed With: patient, family        Progress: improving  Outcome Evaluation: Pt A&Ox4, discharge to [facility], room air, assist x1 for ambulation, jimenes to bsd to stay and voiding trial as out pt per note by urology, scheduled pain meds given with some relieve, pain patches to rt hip and rt ribs lower frontal, accu checks with med per sliding scale, vitals as charted

## 2023-03-14 NOTE — DISCHARGE SUMMARY
Patient Name: Helena Carmen  : 1931  MRN: [id number]    Date of Admission: 3/7/2023  Date of Discharge:  3/14/2023  Primary Care Physician: Mariah Hickman MD      Chief Complaint:   Weakness - Generalized and Back Pain (Bilteral legs r>l)      Discharge Diagnoses     Active Hospital Problems    Diagnosis  POA   • **Acute left-sided low back pain with left-sided sciatica [M54.42]  Yes   • Rib fracture [S22.39XA]  Yes   • Chronic ITP (idiopathic thrombocytopenia) (HCC) [D69.3]  Yes   • Closed fracture of fifth lumbar vertebra (HCC) [S32.059A]  Yes   • Hyponatremia [E87.1]  Yes   • Hypothyroidism [E03.9]  Yes   • Chronic pain disorder [G89.4]  Yes   • Macular degeneration [H35.30]  Yes   • Type 2 diabetes mellitus with hyperglycemia (HCC) [E11.65]  Yes   • Paroxysmal atrial fibrillation (HCC) [I48.0]  Yes   • Essential hypertension [I10]  Yes   • Legally blind [H54.8]  Yes   • Stage 4 chronic kidney disease (HCC) [N18.4]  Yes      Resolved Hospital Problems   No resolved problems to display.        Hospital Course     Ms. Carmen is a 89 y.o. female with a history of L5 compression fx, hypothyroidism, HTN, macular degeneration/legally blind, DM, paroxysmal afib, chronic TCP who presented to [facility] initially complaining of increased back pain, decreased mobility.  Please see the admitting history and physical for further details.    JUAN LUIS, Urology and Hematology have followed. MRI was completed with results as below. JUAN LUIS recommended lumbar CAT once platelet count > 100k. Hematology has continued daily prednisone and gave IVIG from 3/9-3/11 with improvement in platelets. She has follow up with Dr. Sherman, Hematology on 3/21/23. She underwent lumbar CAT on 3/13/23 w/good response. Pain is much improved. She was able to ambulate 15 ft and sit up in a chair this a.m. with PT. Urology was consulted for urine retention who recommended to discharge with indwelling catheter, continue flomax,  and follow up outpatient for voiding trial. She has had swelling to RUE, doppler was negative for DVT. Lasix has been held for hyponatremia; recommend follow up BMP in 3-4 days to re-evaluate, Na has improved to 131 from 128. BG trends are acceptable on current regimen. She was also found to have rt rib fractures, acute vs healing/old fractures. Pain has been controlled w/lidoderm patch, hydrocodone and muscle relaxants as needed. Medically she is stable for discharge to SNF.  Day of Discharge     Subjective:  Just got back to bed. Worked with PT and sat in the chair today. Pain improved. Last BM 2-3 days ago; denies abd pain, nausea. Appetite wnl. Agrees with discharge to SNF    Physical Exam:  Temp:  [97.4 °F (36.3 °C)-98 °F (36.7 °C)] 98 °F (36.7 °C)  Heart Rate:  [59-64] 59  Resp:  [16-18] 18  BP: (104-125)/(40-59) 118/58  Body mass index is 35.82 kg/m².  Physical Exam  Vitals and nursing note reviewed.   Constitutional:       General: She is not in acute distress.     Appearance: She is obese. She is ill-appearing. She is not toxic-appearing.      Comments: Chronically ill appearing   HENT:      Head: Normocephalic.      Mouth/Throat:      Mouth: Mucous membranes are moist.   Eyes:      Conjunctiva/sclera: Conjunctivae normal.   Cardiovascular:      Rate and Rhythm: Normal rate and regular rhythm.   Pulmonary:      Effort: Pulmonary effort is normal. No respiratory distress.      Breath sounds: No wheezing or rales.   Abdominal:      General: Bowel sounds are normal. There is no distension.      Palpations: Abdomen is soft.   Musculoskeletal:      Cervical back: Neck supple.      Right lower leg: No edema.      Left lower leg: No edema.   Skin:     General: Skin is warm and dry.   Neurological:      Mental Status: She is alert and oriented to person, place, and time.   Psychiatric:         Mood and Affect: Mood normal.         Behavior: Behavior normal.         Consultants     Consult Orders (all) (From  admission, onward)     Start     Ordered    03/13/23 0751  Inpatient Anesthesia Pain Management Consult  Once        Specialty:  Pain Medicine  Provider:  (Not yet assigned)    03/13/23 0750    03/10/23 1044  Inpatient Urology Consult  Once        Specialty:  Urology  Provider:  Hamzah Darling MD    03/10/23 1044    03/09/23 0000  Inpatient Anesthesia Pain Management Consult  Once        Specialty:  Pain Medicine  Provider:  (Not yet assigned)    03/08/23 1342    03/08/23 0533  Hematology & Oncology Inpatient Consult  Once        Specialty:  Hematology and Oncology  Provider:  Pablo Sherman Jr., MD    03/08/23 0532    03/07/23 1436  Neurosurgery (on-call MD unless specified)  Once        Specialty:  Neurosurgery  Provider:  (Not yet assigned)    03/07/23 1435    03/07/23 1339  [facility] (on-call MD unless specified) Details  Once        Specialty:  Hospitalist  Provider:  (Not yet assigned)    03/07/23 1339    03/07/23 1339  Hematology and Oncology (on-call MD unless specified)  Once        Specialty:  Hematology and Oncology  Provider:  Pablo Sherman Jr., MD    03/07/23 1339              Procedures     * Surgery not found *      Imaging Results (All)     Procedure Component Value Units Date/Time    FL Guided Pain Management SI Joint [[id number]] Resulted: 03/13/23 1250     Updated: 03/13/23 1307    CT Chest Without Contrast Diagnostic [[id number]] Collected: 03/12/23 1757     Updated: 03/13/23 0734    Narrative:      CT CHEST WITHOUT CONTRAST     HISTORY: 89-year-old female with right-sided chest pain.     TECHNIQUE: Radiation dose reduction techniques were utilized, including  automated exposure control and exposure modulation based on body size.   3 mm images were obtained through the chest without the administration  of IV contrast. Compared with chest CT 08/07/2022.     FINDINGS: A few of the right anterior lateral ribs have a buckled  appearance and this was not present previously as seen at the  anterior  aspect of the right 6th rib image 55. No definitive acute fractures are  seen. There are minimal bilateral pleural effusions and very mild  dependent atelectatic change. There is a calcified granuloma within the  ectatic bronchus at the lateral segment of the right middle lobe with  some adjacent scarring. There is no lymphadenopathy within the chest.  There is a very large left thyroid lobe goiter which extends into the  mediastinum and has moderate mass effect on the trachea to the right.  Overall appearance is stable, series 2 image 13. Extensive coronary  artery calcifications and mitral annulus calcifications are noted. AVR  is noted.       Impression:      1. There is a buckled contour of some of the right anterior lateral  right ribs which was not present on the previous CT 08/07/2022, but are  otherwise age-indeterminate. Some of these may possibly represent acute  buckle fractures, but healing or old fractures are favored.  2. Minimal bilateral pleural effusions     This report was finalized on 3/13/2023 7:31 AM by Dr. Terri Stoner M.D.       CT Abdomen Pelvis Stone Protocol [[id number]] Collected: 03/09/23 1555     Updated: 03/09/23 1609    Narrative:      CT ABDOMEN AND PELVIS WITH IV CONTRAST     HISTORY: Neurogenic bladder. Abdominal pain.     TECHNIQUE: Radiation dose reduction techniques were utilized, including  automated exposure control and exposure modulation based on body size.   3 mm images were obtained through the abdomen and pelvis after the  administration of IV contrast.      COMPARISON: MRI 03/07/2023, 10/16/2022     FINDINGS:  Lower chest: The heart size is stable. Dense calcification about the  mitral valve. Atherosclerosis thoracoabdominal aorta and branch vessels.  Bibasilar atelectasis with trace pleural fluid slightly increased from  the prior. Pleural and parenchymal scarring. Calcified granulomas.  Recommend follow-up to resolution after treatment. Subpleural nodularity  in  the lingula similar to the prior.     Liver: Hepatic steatosis with morphologic changes of chronic liver  disease. Evaluation for underlying lesions limited without contrast.     Biliary tract: Cholecystectomy.     Spleen: Calcification along the lateral margin similar to the prior  likely the sequela of prior trauma or infection.     Pancreas: Within normal limits.     Adrenals: Within normal limits.     Kidneys: Stable subcentimeter hypodense lesion upper pole left kidney  may represent a hemorrhagic or proteinaceous cyst however, solid lesion  not entirely excluded. Subsequent follow-up with ultrasound or  multiphase imaging could be considered. Right renal sinus cyst.  Intrarenal vascular calcifications. No hydroureteronephrosis.     Bowel:  Duodenal diverticulum. Small bowel loops are normal in caliber  without obstruction. Moderate colonic stool burden. Scattered colonic  diverticulosis without acute diverticulitis. Redundant colon. Ensure  up-to-date with colonoscopy. Postsurgical changes ventral abdominal wall  suggesting prior hernia repair. Fat-containing superimposed  periumbilical hernia similar to the prior.     Peritoneum: Within normal limits.     Vasculature:    Extensive atherosclerosis thoracoabdominal aorta and  branch vessels with severe stenosis of the celiac, superior mesenteric,  and bilateral renal origins.     Lymph Nodes:  Scattered subcentimeter nodes.                                                            Pelvic organs: Distended urinary bladder similar to the prior.  Hysterectomy. Extensive vascular calcifications. Phleboliths.     Abdominal/Pelvic Wall: Postsurgical scarring ventral abdominal wall..     Bones: Diffuse bone demineralization with multilevel degenerative  changes. Compression fracture L5 vertebral body with sclerosis similar  in appearance to the prior MRI. Please refer to that exam for further  detail. No retropulsion..       Impression:      1.  No acute  intra-abdominal or pelvic process.  2.  Distended urinary bladder extending to just below the umbilicus.  3.  Increasing bibasilar airspace disease favoring atelectasis with  trace pleural fluid. Recommend follow-up to resolution.  4.  L5 compression fractures similar to the MRI of 03/07/2023.  5.  Please see above for additional chronic findings/recommendations.            This report was finalized on 3/9/2023 4:05 PM by Dr. Gisell Gonzalez M.D.       MRI Lumbar Spine Without Contrast [[id number]] Collected: 03/07/23 2012     Updated: 03/08/23 0853    Narrative:      MRI OF THE LUMBAR SPINE WITHOUT CONTRAST ON 03/07/2023     CLINICAL HISTORY: Patient has bilateral low back pain, history of fall,  L5 fracture from coughing has thrombocytopenia and history of uterine  cancer.      TECHNIQUE: Sagittal T1, proton density and fat-suppressed T2-weighted  images were obtained of the lumbar spine. In addition axial T2-weighted  images were obtained from T12 to S2. Thin cut axial T1-weighted images  were obtained angled through the interspaces from T12 to S1.      COMPARISON: This is correlated to a prior lumbar spine MRI from [facility] on 10/17/2022.     FINDINGS: The distal thoracic cord and the conus is normal in signal  intensity. The conus terminates at the level of the inferior body of L2  which is borderline probably lower limits of normal and is unchanged  since 10/17/2022     At T11-12 the disc space and facets are normal with no canal or  foraminal narrowing.     At T12-L1 the disc space and facets are normal with no canal or  foraminal narrowing.     Patient has a levoscoliotic curvature of the lumbar spine, apex at the  L2-3 lumbar level     .At L1-2 there is mild disc space narrowing and degenerative endplate  changes, 2 mm retrolisthesis of L1 on L2, minimal posterior spurring  facet overgrowth but there is essentially no canal or foraminal  narrowing.     At L2-3 there is disc space narrowing  and there are degenerative  endplate changes, minimal posterior spurring and there is mild bilateral  facet overgrowth. There is no canal or left foraminal narrowing. There  is eccentric spurring into the right foramen and to the far right  laterally that mildly narrows the right foramen and the far right  lateral spurs abut the ventral aspect of the right L2 nerve root lateral  to the right foramen     At L3-4 there is moderate bilateral facet overgrowth and there is a 5 to  7 mm degenerative anterolisthesis of L3 with respect to L4. There is  mild canal narrowing and there is mild bilateral bony foraminal  narrowing, far right lateral endplate spurs abut the anteromedial aspect  of the right L3 nerve root at the lateral edge of the right foramen and  far right laterally.     At L4-5 there is mild bilateral facet overgrowth. There is bony fusion  across the facet joints, posterior disc margin is normal there is no  canal or lateral recess or foraminal narrowing.      At L5-S1 there is mild left and there is moderate right facet  overgrowth, fluid in the right facet joint, fluid distending the right  facet joint. Posterior disc margin is normal. There is no canal or  lateral recess narrowing. There is synovial thickening and spurring off  the facets extending to the posterior superior foramina and there is  bulging disc material into the inferior foramina and there is at least  moderate left and moderate-to-severe right foraminal narrowing that  could compromise the exiting L5 nerve roots.      This patient has a compression fracture involving the L5 vertebra. On  prior MRI 10/17/2022 there is a horizontal compression fracture  involving the mid-to-superior body of L5 with a fluid cleft tracking in  the superior body of L5 as well as in the right inferior body of L5.  There was only 10-20% loss of vertebral body height. I believe there has  been worsening of the compression fracture. There is a persistent  fluid  cleft tracking the superior body of L5 and the right inferior body of  L5. There is more prominent can cavity of the inferior endplate of L5  and there has been further loss of vertebral body height and currently  there is 25% loss of anterior, 30% loss of central and 25% loss of  posterior vertebral body height. There has been worsening loss of the  vertebral body height. There is 25% loss of anterior, 30% loss central,  25% loss of posterior vertebral body height.     On the sagittal T2-weighted images there is evidence of prominent  distention of the urinary bladder with urine.       Impression:      1. On the prior MRI of the lumbar spine on 10/17/2022 there was an acute  compression fracture involving the superior body of L5 with a fluid  cleft tracking anteroposteriorly through the superior body of the L5  vertebra and there was an acute fracture involving the right inferior  body of L5 with a fluid cleft in the right inferior body of L5 vertebra.  The compression fracture has slightly worsened and there is a persistent  fluid cleft in the right inferior body of L5 and a horizontal fluid  cleft in the body of L5 and now there is 25% loss of the anterior  posterior vertebral body height and there is persistent marrow edema  throughout the compressed L5 vertebra. Furthermore, the fracture plane  extends through the left posterior body of L5 just anterior territory of  the left pedicle inserts on the left posterior body of L5. There is been  further loss of height of the neural foramen at L5-S1. There is spurring  off the facets and the posterior foramina and there is bulging disc  material into the inferior foramina and there is at least moderate left  and there is moderate-to-severe right foraminal narrowing at L5-S1 that  could affect the exiting L5 nerve roots. The fracture could be more  comprehensively assessed with a CT of the lumbar spine if clinically  indicated.  2. At L3-4 there is moderate  bilateral facet overgrowth and a 5 mm  anterolisthesis of L3 on L4 and there is mild canal and bilateral  foraminal narrowing. At L2-3 there is mild posterior spurring but no  canal narrowing and there is eccentric spurring into the right foramen  and to the far right laterally mildly narrows the right foramen and the  far right lateral spurs abut the ventral aspect of the right L2 nerve  root lateral to the right foramen  3. There is prominent distention of the urinary bladder with urine.  Correlate this clinically.     This report was finalized on 3/8/2023 8:50 AM by Dr. Ovidio Sorto M.D.       XR Chest 1 View [[id number]] Collected: 03/07/23 1249     Updated: 03/07/23 1300    Narrative:      XR CHEST 1 VW-, XR SPINE LUMBAR COMPLETE 4+VW-, XR PELVIS 1 OR 2 VW-     Clinical: Low back and bilateral hip pain with a history of L5 fracture,  chest congestion     COMPARISON chest radiograph 12/21/2022, lumbar spine 12/7/2022 and AP  pelvis from 12/7/2022     Chest findings: There are median sternotomy wires in position as before.  Atherosclerotic calcification of the aorta. There are some prominence of  the thoracic inlet in part related to apical lordotic projection. There  is cardiomegaly. No pulmonary edema, lung consolidation or gross pleural  effusion seen. Calcified benign granulomas demonstrated at the right  lung base. Calcified mitral valve again seen.     CONCLUSION: Cardiomegaly.     AP pelvis findings: There is mild bilateral hip joint narrowing as  before. There is bone hypertrophy demonstrated along the acetabulum,  similar to the previous examination. No avascular necrosis or fracture.  Right and left hemipelvis have a normal appearance. Vascular arterial  calcifications demonstrated within the soft tissues.     CONCLUSION: Bilateral hip joint degeneration similar to 12/7/2022. No  acute osseous abnormality has developed.     Lumbar spine findings: There is multilevel lumbar disc degeneration  similar to  the previous examination.     The L5 vertebral body height appears slightly smaller compared to the  previous examination, this could be projectional or secondary to new  mild compression.     There is lower lumbar facet hypertrophy seen. No spondylolysis nor  spondylolisthesis identified. Spurring and osteophyte formation again  seen.     CONCLUSION: There is degenerative change which is similar to 12/7/2022.  The L5 vertebral body height appears slightly diminished compared to  12/7/2022, which could be related to projectional factors or new mild  compression.     This report was finalized on 3/7/2023 12:57 PM by Dr. Art Fabian M.D.       XR Spine Lumbar Complete 4+VW [[id number]] Collected: 03/07/23 1249     Updated: 03/07/23 1300    Narrative:      XR CHEST 1 VW-, XR SPINE LUMBAR COMPLETE 4+VW-, XR PELVIS 1 OR 2 VW-     Clinical: Low back and bilateral hip pain with a history of L5 fracture,  chest congestion     COMPARISON chest radiograph 12/21/2022, lumbar spine 12/7/2022 and AP  pelvis from 12/7/2022     Chest findings: There are median sternotomy wires in position as before.  Atherosclerotic calcification of the aorta. There are some prominence of  the thoracic inlet in part related to apical lordotic projection. There  is cardiomegaly. No pulmonary edema, lung consolidation or gross pleural  effusion seen. Calcified benign granulomas demonstrated at the right  lung base. Calcified mitral valve again seen.     CONCLUSION: Cardiomegaly.     AP pelvis findings: There is mild bilateral hip joint narrowing as  before. There is bone hypertrophy demonstrated along the acetabulum,  similar to the previous examination. No avascular necrosis or fracture.  Right and left hemipelvis have a normal appearance. Vascular arterial  calcifications demonstrated within the soft tissues.     CONCLUSION: Bilateral hip joint degeneration similar to 12/7/2022. No  acute osseous abnormality has developed.     Lumbar spine  findings: There is multilevel lumbar disc degeneration  similar to the previous examination.     The L5 vertebral body height appears slightly smaller compared to the  previous examination, this could be projectional or secondary to new  mild compression.     There is lower lumbar facet hypertrophy seen. No spondylolysis nor  spondylolisthesis identified. Spurring and osteophyte formation again  seen.     CONCLUSION: There is degenerative change which is similar to 12/7/2022.  The L5 vertebral body height appears slightly diminished compared to  12/7/2022, which could be related to projectional factors or new mild  compression.     This report was finalized on 3/7/2023 12:57 PM by Dr. Art Fabian M.D.       XR Pelvis 1 or 2 View [[id number]] Collected: 03/07/23 1249     Updated: 03/07/23 1300    Narrative:      XR CHEST 1 VW-, XR SPINE LUMBAR COMPLETE 4+VW-, XR PELVIS 1 OR 2 VW-     Clinical: Low back and bilateral hip pain with a history of L5 fracture,  chest congestion     COMPARISON chest radiograph 12/21/2022, lumbar spine 12/7/2022 and AP  pelvis from 12/7/2022     Chest findings: There are median sternotomy wires in position as before.  Atherosclerotic calcification of the aorta. There are some prominence of  the thoracic inlet in part related to apical lordotic projection. There  is cardiomegaly. No pulmonary edema, lung consolidation or gross pleural  effusion seen. Calcified benign granulomas demonstrated at the right  lung base. Calcified mitral valve again seen.     CONCLUSION: Cardiomegaly.     AP pelvis findings: There is mild bilateral hip joint narrowing as  before. There is bone hypertrophy demonstrated along the acetabulum,  similar to the previous examination. No avascular necrosis or fracture.  Right and left hemipelvis have a normal appearance. Vascular arterial  calcifications demonstrated within the soft tissues.     CONCLUSION: Bilateral hip joint degeneration similar to 12/7/2022.  No  acute osseous abnormality has developed.     Lumbar spine findings: There is multilevel lumbar disc degeneration  similar to the previous examination.     The L5 vertebral body height appears slightly smaller compared to the  previous examination, this could be projectional or secondary to new  mild compression.     There is lower lumbar facet hypertrophy seen. No spondylolysis nor  spondylolisthesis identified. Spurring and osteophyte formation again  seen.     CONCLUSION: There is degenerative change which is similar to 12/7/2022.  The L5 vertebral body height appears slightly diminished compared to  12/7/2022, which could be related to projectional factors or new mild  compression.     This report was finalized on 3/7/2023 12:57 PM by Dr. Art Fabian M.D.           Results for orders placed during the hospital encounter of 03/07/23    Duplex Venous Upper Extremity - Right CAR    Interpretation Summary  •  Normal bilateral upper extremity venous duplex scan.    Results for orders placed during the hospital encounter of 07/30/22    Adult Transthoracic Echo Complete W/ Cont if Necessary Per Protocol    Interpretation Summary  · Left ventricular ejection fraction appears to be 61 - 65%. Left ventricular systolic function is normal.  · Left ventricular wall thickness is consistent with mild concentric hypertrophy.  · Left ventricular diastolic function is consistent with (grade II w/high LAP) pseudonormalization.  · Normal right ventricular cavity size and systolic function noted.  · The left atrial cavity is moderately dilated.  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 The aortic valve peak and mean gradients are within defined limits. The prosthetic aortic valve is grossly normal.  · There is severe mitral annular calcification. The mitral valve leaflets are thickened. There are several calcified chordae  · Mild mitral valve regurgitation is present. No significant mitral valve stenosis is  present.  · Mild to moderate tricuspid valve regurgitation is present.  · Calculated right ventricular systolic pressure from tricuspid regurgitation is 37 mmHg.  · There is no evidence of pericardial effusion    Pertinent Labs     Results from last 7 days   Lab Units 03/14/23  0544 03/13/23  0434 03/12/23  0704 03/11/23  0654   WBC 10*3/mm3 9.68 7.37 5.87 4.58   HEMOGLOBIN g/dL 10.5* 10.4* 10.8* 10.9*   PLATELETS 10*3/mm3 160 132* 100* 77*     Results from last 7 days   Lab Units 03/14/23  0544 03/13/23  0434 03/12/23  0704 03/11/23  0654   SODIUM mmol/L 131* 128* 129* 134*   POTASSIUM mmol/L 4.4 4.6 4.4 4.3   CHLORIDE mmol/L 99 97* 99 104   CO2 mmol/L 19.0* 19.4* 23.0 21.4*   BUN mg/dL 80* 62* 51* 41*   CREATININE mg/dL 1.77* 1.46* 1.33* 1.36*   GLUCOSE mg/dL 246* 220* 244* 170*   EGFR mL/min/1.73 26.7* 33.6* 37.6* 36.6*     Results from last 7 days   Lab Units 03/09/23  0525 03/08/23  0621   ALBUMIN g/dL 3.5 3.6   BILIRUBIN mg/dL 0.5 0.6   ALK PHOS U/L 52 51   AST (SGOT) U/L 12 9   ALT (SGPT) U/L 13 14     Results from last 7 days   Lab Units 03/14/23  0544 03/13/23 0434 03/12/23  0704 03/11/23  0654 03/10/23  0523 03/09/23  0525 03/08/23  0621   CALCIUM mg/dL 8.2 8.9 8.3 8.3   < > 8.0* 8.1*   ALBUMIN g/dL  --   --   --   --   --  3.5 3.6    < > = values in this interval not displayed.       Results from last 7 days   Lab Units 03/07/23  1441   HSTROP T ng/L 67*     Results from last 7 days   Lab Units 03/13/23  1610 03/13/23  0434   SODIUM UR mmol/L 67  --    OSMOLALITY UR mOsm/kg 291  --    URIC ACID mg/dL  --  8.4*         Invalid input(s): LDLCALC          Test Results Pending at Discharge       Discharge Details        Discharge Medications      New Medications      Instructions Start Date   acetaminophen 325 MG tablet  Commonly known as: TYLENOL   650 mg, Oral, Every 4 Hours PRN      dextromethorphan polistirex ER 30 MG/5ML Suspension Extended Release oral suspension  Commonly known as: DELSYM   60 mg,  Oral, Every 12 Hours Scheduled      docusate sodium 100 MG capsule   100 mg, Oral, 2 Times Daily      guaiFENesin 600 MG 12 hr tablet  Commonly known as: MUCINEX   1,200 mg, Oral, Every 12 Hours Scheduled      insulin lispro 100 UNIT/ML injection  Commonly known as: ADMELOG  Replaces: insulin lispro 100 UNIT/ML injection   0-9 Units, Subcutaneous, 3 Times Daily Before Meals      sennosides-docusate 8.6-50 MG per tablet  Commonly known as: PERICOLACE   2 tablets, Oral, Nightly         Changes to Medications      Instructions Start Date   gabapentin 600 MG tablet  Commonly known as: NEURONTIN  What changed: how to take this   600 mg, Oral, 2 Times Daily      insulin glargine 100 UNIT/ML injection  Commonly known as: LANTUS, SEMGLEE  What changed: how much to take   25 Units, Subcutaneous, Nightly      levothyroxine 50 MCG tablet  Commonly known as: SYNTHROID, LEVOTHROID  What changed:   · medication strength  · when to take this   50 mcg, Oral, Every Early Morning   Start Date: March 15, 2023        Continue These Medications      Instructions Start Date   Advair -21 MCG/ACT inhaler  Generic drug: fluticasone-salmeterol   2 puffs, Inhalation, 2 Times Daily - RT      amLODIPine 2.5 MG tablet  Commonly known as: NORVASC   2.5 mg, Oral, Daily      carvedilol 12.5 MG tablet  Commonly known as: COREG   12.5 mg, Oral, 2 Times Daily      cetirizine 5 MG tablet  Commonly known as: zyrTEC   5 mg, Oral, Daily PRN      cholecalciferol 25 MCG (1000 UT) tablet  Commonly known as: VITAMIN D3   1,000 Units, Oral, Daily      CLARITIN PO   Oral      hydrALAZINE 25 MG tablet  Commonly known as: APRESOLINE   50 mg, Oral, 2 Times Daily      HYDROcodone-acetaminophen 7.5-325 MG per tablet  Commonly known as: NORCO   1 tablet, Oral, Every 6 Hours      lidocaine 5 %  Commonly known as: LIDODERM   1 patch, Transdermal, Every 24 Hours, Remove & Discard patch within 12 hours or as directed by MD      LORazepam 0.5 MG tablet  Commonly  known as: ATIVAN   0.5 mg, Oral, Every 8 Hours PRN      methocarbamol 750 MG tablet  Commonly known as: ROBAXIN   750 mg, Oral, Every 8 Hours PRN      montelukast 10 MG tablet  Commonly known as: SINGULAIR   1 tablet, Oral, Nightly      ondansetron 8 MG tablet  Commonly known as: ZOFRAN   8 mg, Oral, As Needed      polyethylene glycol 17 GM/SCOOP powder  Commonly known as: MIRALAX   17 g, Oral, Daily      predniSONE 5 MG tablet  Commonly known as: DELTASONE   10 mg, Oral, Daily With Breakfast      tamsulosin 0.4 MG capsule 24 hr capsule  Commonly known as: FLOMAX   0.4 mg, Oral, Every Night at Bedtime         Stop These Medications    furosemide 40 MG tablet  Commonly known as: LASIX     insulin lispro 100 UNIT/ML injection  Commonly known as: humaLOG  Replaced by: insulin lispro 100 UNIT/ML injection            Allergies   Allergen Reactions   • Erythromycin Unknown (See Comments) and Other (See Comments)     Pt states she does not remember but it was many years ago  Pt states she does not remember but it was many years ago   • Statins Myalgia   • Cephalexin Other (See Comments) and Unknown (See Comments)     June 2022 Pt has tolerated ceftriaxone and cefepime during admission.   Pt states she does not remember reaction but it was many years ago   • Penicillins Rash   • Sulfa Antibiotics Itching and Rash       Discharge Disposition:  Skilled Nursing Facility (DC - External)      Discharge Diet:  Diet Order   Procedures   • Diet: Cardiac Diets, Diabetic Diets, Renal Diets; Healthy Heart (2-3 Na+); Consistent Carbohydrate; Low Sodium (2-3g), Low Potassium, Low Phosphorus; Texture: Regular Texture (IDDSI 7); Fluid Consistency: Thin (IDDSI 0)       Discharge Activity:   Activity Instructions     Activity as Tolerated            CODE STATUS:    Code Status and Medical Interventions:   Ordered at: 03/07/23 6843     Code Status (Patient has no pulse and is not breathing):    CPR (Attempt to Resuscitate)     Medical  Interventions (Patient has pulse or is breathing):    Full Support       Future Appointments   Date Time Provider Department Center   3/21/2023  4:20 PM Pablo Sherman Jr., MD [facility]   4/18/2023  2:20 PM Beth Butcher MD [facility]      Contact information for follow-up providers     Mariah Hickman MD Follow up in 1 week(s).    Specialty: Family Medicine  Why: Follow up 1 week after discharge  Contact information:  [address]  [address]  [place] [address]  [phone]             Pablo Sherman Jr., MD Follow up on 3/21/2023.    Specialties: Hematology and Oncology, Oncology, Hematology  Contact information:  [address]  [address]  [place] [address]  [phone]             Chanda Pineda APRN Follow up.    Specialties: Nurse Practitioner, Neurosurgery  Why: If symptoms worsen  Contact information:  [address]  [address]  [place] [address]  [phone]             Hussain Cruz MD. Schedule an appointment as soon as possible for a visit in 2 week(s).    Specialty: Urology  Contact information:  [address]  [address]  [place] [address]  [phone]                   Contact information for after-discharge care     Destination     [facility] .    Service: Skilled Nursing  Contact information:  [address] [address] [phone]                 [facility]     [facility] .    Service: Home Health Services  Contact information:  [address]  [place] [address] [phone]                             Time Spent on Discharge:  Greater than 30 minutes      RIGO Cruz  [facility]  03/14/23  13:38 EDT

## 2023-03-14 NOTE — CASE MANAGEMENT/SOCIAL WORK
Continued Stay Note  [facility]     Patient Name: Helena Carmen  MRN: [id number]  Today's Date: 3/14/2023    Admit Date: 3/7/2023    Plan: SNF at [facility]   Discharge Plan     Row Name 03/14/23 1232       Plan    Plan SNF at [facility]    Roadmap to Recovery Yes    Patient/Family in Agreement with Plan yes    Plan Comments Spoke with Gina/[employer], pt bed will be ready at [facility] after 3pm, [facility] w/c van scheduled at 3pm reservation #[id number]. Spoke with patient and family at bedside, they are agreeable to d/c plan. CCP will fax d/c summary, pkt to RN, updated MD. Based on interdisciplinary assessments, the recommended discharge plan is SNF. -Ngoc GARAY               Discharge Codes    No documentation.               Expected Discharge Date and Time     Expected Discharge Date Expected Discharge Time    Mar 14, 2023             Ngoc Frost RN

## 2023-03-14 NOTE — CASE MANAGEMENT/SOCIAL WORK
Case Management Discharge Note      Final Note: snf at [facility] via [facility]         Selected Continued Care - Admitted Since 3/7/2023     Destination Coordination complete.    Service Provider Selected Services Address Phone Fax Patient Preferred    [facility] [address] [address] [phone] --          Durable Medical Equipment    No services have been selected for the patient.              Dialysis/Infusion    No services have been selected for the patient.              Home Medical Care     Service Provider Selected Services Address Phone Fax Patient Preferred    [facility] [address], [address], [place] [address] [phone] --          Therapy    No services have been selected for the patient.              Community Resources    No services have been selected for the patient.              Community & DME    No services have been selected for the patient.                Selected Continued Care - Prior Encounters Includes continued care and service providers with selected services from prior encounters from 12/7/2022 to 3/14/2023    Discharged on 12/25/2022 Admission date: 12/20/2022 - [place] [facility]    [place]     Service Provider Selected Services Address Phone Fax Patient Preferred    [facility] [address], [address], [place] [address] [phone] --                    Transportation Services  W/C Van: [facility]    Final Discharge Disposition Code: 03 - skilled nursing facility (SNF)

## 2023-03-14 NOTE — THERAPY TREATMENT NOTE
Patient Name: Helena Carmen  : 1931    MRN: [id number]                              Today's Date: 3/14/2023       Admit Date: 3/7/2023    Visit Dx:     ICD-10-CM ICD-9-CM   1. Acute on chronic bilateral low back pain  M54.50 724.2   2. Unable to walk  R26.2 719.7   3. Generalized weakness  R53.1 780.79   4. Elevated troponin  R77.8 790.6   5. Thrombocytopenia (HCC)  D69.6 287.5   6. History of ITP  Z86.2 V12.3   7. Chronic kidney disease, unspecified CKD stage  N18.9 585.9   8. Closed compression fracture of L5 lumbar vertebra, with delayed healing, subsequent encounter  S32.050G V54.17     Patient Active Problem List   Diagnosis   • Aortic valve stenosis   • Fatigue   • MI (mitral incompetence)   • PVC (premature ventricular contraction)   • Legally blind   • Essential hypertension   • Hypertriglyceridemia   • Pulmonary hypertension (HCC)   • Paroxysmal atrial fibrillation (HCC)   • Anxiety   • Stage 4 chronic kidney disease (HCC)   • Chronic pain disorder   • Degeneration of lumbar intervertebral disc   • Type 2 diabetes mellitus with hyperglycemia (HCC)   • Esophageal reflux   • Gastroparesis   • Hiatal hernia   • Iatrogenic hypothyroidism   • Macular degeneration   • Malignant neoplasm of uterus (HCC)   • Osteoarthritis of knee   • Peripheral nerve disease   • Secondary hyperparathyroidism (HCC)   • Thrombocytopenia (HCC)   • Chronic heart failure with preserved ejection fraction (HCC)   • Other cirrhosis of liver (HCC)   • Hypothyroidism   • Nonrheumatic tricuspid valve regurgitation   • Anemia, chronic disease   • Hyponatremia   • Closed fracture of fifth lumbar vertebra (HCC)   • Closed fracture of fifth lumbar vertebra, unspecified fracture morphology, initial encounter ([facility])   • Diabetic polyneuropathy associated with type 2 diabetes mellitus (HCC)   • Chronic ITP (idiopathic thrombocytopenia) (HCC)   • Chronic midline low back pain with bilateral sciatica   • Acute deep vein thrombosis of calf,  bilateral (HCC)   • Bilateral low back pain without sciatica, unspecified chronicity   • Acute left-sided low back pain with left-sided sciatica   • Urine retention     Past Medical History:   Diagnosis Date   • Aortic valve stenosis     s/p tissue AVR   • Back pain    • CKD (chronic kidney disease)    • Colitis due to Clostridioides difficile 01/26/2022   • Diastolic dysfunction     Grade 2 per echocardiogram 2013   • Diverticulosis    • Exertional shortness of breath    • Heart disease    • Hiatal hernia    • Hyperlipidemia    • Hypertension    • Hyperthyroidism    • Hypertriglyceridemia 05/31/2018   • Hypothyroidism    • L5 compression fracture (HCC) 08/2022   • Left ventricular hypertrophy    • Legally blind    • Liver disease    • Low back pain    • Macular degeneration    • Mitral regurgitation    • Osteoarthritis of hip    • Osteoporosis    • Pancreatitis 01/26/2022   • Paroxysmal atrial fibrillation (HCC)    • Peripheral neuropathy    • Premature ventricular contractions    • Pulmonary hypertension (HCC)    • Renal insufficiency syndrome    • Type 2 diabetes mellitus (HCC)    • Uterine cancer (HCC)      Past Surgical History:   Procedure Laterality Date   • AORTIC VALVE REPAIR/REPLACEMENT     • CATARACT EXTRACTION      1970, 1999   • CHOLECYSTECTOMY     • ENDOSCOPY  08/15/2014    no gross lesions in stomach/duodenum, erythrematous mucosa in stomach   • EPIDURAL BLOCK     • HYSTERECTOMY  2007   • STERNOTOMY        General Information     Row Name 03/14/23 0937          Physical Therapy Time and Intention    Document Type therapy note (daily note) (P)   -MM     Mode of Treatment physical therapy (P)   -MM     Row Name 03/14/23 0937          Cognition    Orientation Status (Cognition) oriented x 3 (P)   -MM     Row Name 03/14/23 0937          Safety Issues, Functional Mobility    Impairments Affecting Function (Mobility) endurance/activity tolerance;strength;shortness of breath;postural/trunk control;range of  motion (ROM) (P)   -MM     Comment, Safety Issues/Impairments (Mobility) Pt has inc kyphosis (P)   -MM           User Key  (r) = Recorded By, (t) = Taken By, (c) = Cosigned By    Initials Name Provider Type    Farhana Tarango, ABEBA Student PTA Student               Mobility     Row Name 03/14/23 0939          Bed Mobility    Supine-Sit [place] (Bed Mobility) verbal cues;1 person assist;contact guard (P)   -MM     Assistive Device (Bed Mobility) bed rails;head of bed elevated (P)   -MM     Row Name 03/14/23 0939          Bed-Chair Transfer    Bed-Chair [place] (Transfers) contact guard;1 person assist (P)   -MM     Assistive Device (Bed-Chair Transfers) walker, front-wheeled (P)   -MM     Row Name 03/14/23 0939          Sit-Stand Transfer    Sit-Stand [place] (Transfers) minimum assist (75% patient effort) (P)   -MM     Assistive Device (Sit-Stand Transfers) walker, front-wheeled (P)   -MM     Row Name 03/14/23 0939          Gait/Stairs (Locomotion)    Romulus Level (Gait) contact guard (P)   -MM     Assistive Device (Gait) walker, front-wheeled (P)   -MM     Distance in Feet (Gait) 15ft (P)   -MM     Deviations/Abnormal Patterns (Gait) festinating/shuffling;paul decreased;gait speed decreased;stride length decreased;base of support, narrow (P)   -MM     Bilateral Gait Deviations forward flexed posture (P)   -MM           User Key  (r) = Recorded By, (t) = Taken By, (c) = Cosigned By    Initials Name Provider Type    Farhana Tarango PTA Student PTA Student               Obj/Interventions    No documentation.                Goals/Plan    No documentation.                Clinical Impression     Row Name 03/14/23 0941          Plan of Care Review    Plan of Care Reviewed With patient (P)   -MM     Progress improving (P)   -MM     Outcome Evaluation Pt agreed to participate in therapy this AM. Pt requires a back brace when she is sitting or standing. Pt has a kyphotic posture with ambulation at  her baseline. She ambulated 15' and wanted to sit up in the chair this morning. Pt is CGA for her bed mobility and Deandre for her STS. Pt would benefit from continued therapy to improve her mobility and strength. (P)   -MM     Row Name 03/14/23 0941          Therapy Assessment/Plan (PT)    Therapy Frequency (PT) 5 times/wk (P)   -MM     Row Name 03/14/23 0941          Positioning and Restraints    Pre-Treatment Position in bed (P)   -MM     Post Treatment Position chair (P)   -MM     In Chair reclined;call light within reach;encouraged to call for assist;exit alarm on (P)   -MM           User Key  (r) = Recorded By, (t) = Taken By, (c) = Cosigned By    Initials Name Provider Type    Farhana Tarango, PTA Student PTA Student               Outcome Measures     Row Name 03/14/23 0945          How much help from another person do you currently need...    Turning from your back to your side while in flat bed without using bedrails? 2 (P)   -MM     Moving from lying on back to sitting on the side of a flat bed without bedrails? 2 (P)   -MM     Moving to and from a bed to a chair (including a wheelchair)? 3 (P)   -MM     Standing up from a chair using your arms (e.g., wheelchair, bedside chair)? 3 (P)   -MM     Climbing 3-5 steps with a railing? 1 (P)   -MM     To walk in hospital room? 2 (P)   -MM     AM-PAC 6 Clicks Score (PT) 13 (P)   -MM     Highest level of mobility 4 --> Transferred to chair/commode (P)   -MM           User Key  (r) = Recorded By, (t) = Taken By, (c) = Cosigned By    Initials Name Provider Type    Farhana Tarango, PTA Student PTA Student                             Physical Therapy Education     Title: PT OT SLP Therapies (In Progress)     Topic: Physical Therapy (In Progress)     Point: Mobility training (Done)     Learning Progress Summary           Patient Acceptance, E, VU by LAURIE at 3/11/2023 1044    Acceptance, E, VU by LAURIE at 3/8/2023 1030                   Point: Home exercise program (Not  Started)     Learner Progress:  Not documented in this visit.          Point: Body mechanics (Done)     Learning Progress Summary           Patient Acceptance, E,TB, VU by MM at 3/14/2023 0947    Acceptance, E, VU by  at 3/11/2023 1044    Acceptance, E, VU by EL at 3/8/2023 1030                   Point: Precautions (Not Started)     Learner Progress:  Not documented in this visit.                      User Key     Initials Effective Dates Name Provider Type Discipline     11/16/21 -  Teresa Hoyos, PT Physical Therapist PT     02/24/23 -  Farhana Najera PTA Student PTA Student PT              PT Recommendation and Plan     Plan of Care Reviewed With: (P) patient  Progress: (P) improving  Outcome Evaluation: (P) Pt agreed to participate in therapy this AM. Pt requires a back brace when she is sitting or standing. Pt has a kyphotic posture with ambulation at her baseline. She ambulated 15' and wanted to sit up in the chair this morning. Pt is CGA for her bed mobility and Deandre for her STS. Pt would benefit from continued therapy to improve her mobility and strength.     Time Calculation:    PT Charges     Row Name 03/14/23 0937             Time Calculation    Start Time 0821 (P)   -MM      Stop Time 0840 (P)   -MM      Time Calculation (min) 19 min (P)   -MM      PT Received On 03/14/23 (P)   -MM      PT - Next Appointment 03/15/23 (P)   -MM         Time Calculation- PT    Total Timed Code Minutes- PT 19 minute(s) (P)   -MM            User Key  (r) = Recorded By, (t) = Taken By, (c) = Cosigned By    Initials Name Provider Type     Farhana Najera PTA Student PTA Student              Therapy Charges for Today     Code Description Service Date Service Provider Modifiers Qty    [id number] HC GAIT TRAINING EA 15 MIN 3/14/2023 Farhana Najera PTA Student GP 1          PT G-Codes  Outcome Measure Options: AM-PAC 6 Clicks Basic Mobility (PT)  AM-PAC 6 Clicks Score (PT): (P) 13  AM-PAC 6 Clicks Score (OT):  16  Modified [place] Scale: 4 - Moderately severe disability.  Unable to walk without assistance, and unable to attend to own bodily needs without assistance.       Farhana Najera, PTA Student  3/14/2023

## 2023-03-14 NOTE — DISCHARGE PLACEMENT REQUEST
"Helena Carmen (89 y.o. Female)     Date of Birth   02/20/1931    Social Security Number       Address   [address] [place] [address]    Home Phone   [phone]    MRN   [id number]       [employer]   Ori[employer] orthodox    Marital Status                               Admission Date   3/7/23    Admission Type   Emergency    Admitting Provider   Dheeraj Lynch MD    Attending Provider   Ovidio Anguiano MD    Department, Room/Bed   [address], N630/1       Discharge Date       Discharge Disposition   Skilled Nursing Facility (DC - External)    Discharge Destination                               Attending Provider: Ovidio Anguiano MD    Allergies: Erythromycin, Statins, Cephalexin, Penicillins, Sulfa Antibiotics    Isolation: None   Infection: None   Code Status: CPR    Ht: 144.8 cm (57.01\")   Wt: 75.1 kg (165 lb 9.1 oz)    Admission Cmt: None   Principal Problem: Acute left-sided low back pain with left-sided sciatica [M54.42]                 Active Insurance as of 3/7/2023     Primary Coverage     Payor Plan Insurance Group Employer/Plan Group    MEDICARE MEDICARE A & B      Payor Plan Address Payor Plan Phone Number Payor Plan Fax Number Effective Dates    [address] [phone]  2/1/1996 - None Entered    [facility] [id number]       Subscriber Name Subscriber Birth Date Member ID       HELENA CARMEN 2/20/1931 [id number]           Secondary Coverage     Payor Plan Insurance Group Employer/Plan Group     FOR LIFE  FOR LIFE  SUP       Payor Plan Address Payor Plan Phone Number Payor Plan Fax Number Effective Dates    [address] [phone]  4/5/2016 - None Entered    [facility] [id number]       Subscriber Name Subscriber Birth Date Member ID       HELENA CARMEN 2/20/1931 [id number]                 Emergency Contacts      (Rel.) Home Phone Work Phone Mobile Phone    Brandon carmen (Son) [phone] -- --    Radha Hoyos " (Daughter) [phone] -- [phone]    Margaret Loco (Daughter) [phone] -- [phone]    DEANNA GOTTIARAM (Grandchild) -- -- [phone]               Discharge Summary      Preethi Rodriguez, APRN at 23 1338              Patient Name: Helena Carmen  : 1931  MRN: [id number]    Date of Admission: 3/7/2023  Date of Discharge:  3/14/2023  Primary Care Physician: Mariah Hickman MD      Chief Complaint:   Weakness - Generalized and Back Pain (Bilteral legs r>l)      Discharge Diagnoses     Active Hospital Problems    Diagnosis  POA   • **Acute left-sided low back pain with left-sided sciatica [M54.42]  Yes   • Rib fracture [S22.39XA]  Yes   • Chronic ITP (idiopathic thrombocytopenia) (HCC) [D69.3]  Yes   • Closed fracture of fifth lumbar vertebra (HCC) [S32.059A]  Yes   • Hyponatremia [E87.1]  Yes   • Hypothyroidism [E03.9]  Yes   • Chronic pain disorder [G89.4]  Yes   • Macular degeneration [H35.30]  Yes   • Type 2 diabetes mellitus with hyperglycemia (HCC) [E11.65]  Yes   • Paroxysmal atrial fibrillation (HCC) [I48.0]  Yes   • Essential hypertension [I10]  Yes   • Legally blind [H54.8]  Yes   • Stage 4 chronic kidney disease (HCC) [N18.4]  Yes      Resolved Hospital Problems   No resolved problems to display.        Hospital Course     Ms. Carmen is a 89 y.o. female with a history of L5 compression fx, hypothyroidism, HTN, macular degeneration/legally blind, DM, paroxysmal afib, chronic TCP who presented to [facility] initially complaining of increased back pain, decreased mobility.  Please see the admitting history and physical for further details.    JUAN LUIS, Urology and Hematology have followed. MRI was completed with results as below. JUAN LUIS recommended lumbar CAT once platelet count > 100k. Hematology has continued daily prednisone and gave IVIG from 3/9-3/11 with improvement in platelets. She has follow up with Dr. Sherman, Hematology on 3/21/23. She underwent  lumbar CAT on 3/13/23 w/good response. Pain is much improved. She was able to ambulate 15 ft and sit up in a chair this a.m. with PT. Urology was consulted for urine retention who recommended to discharge with indwelling catheter, continue flomax, and follow up outpatient for voiding trial. She has had swelling to RUE, doppler was negative for DVT. Lasix has been held for hyponatremia; recommend follow up BMP in 3-4 days to re-evaluate, Na has improved to 131 from 128. BG trends are acceptable on current regimen. She was also found to have rt rib fractures, acute vs healing/old fractures. Pain has been controlled w/lidoderm patch, hydrocodone and muscle relaxants as needed. Medically she is stable for discharge to SNF.  Day of Discharge     Subjective:  Just got back to bed. Worked with PT and sat in the chair today. Pain improved. Last BM 2-3 days ago; denies abd pain, nausea. Appetite wnl. Agrees with discharge to SNF    Physical Exam:  Temp:  [97.4 °F (36.3 °C)-98 °F (36.7 °C)] 98 °F (36.7 °C)  Heart Rate:  [59-64] 59  Resp:  [16-18] 18  BP: (104-125)/(40-59) 118/58  Body mass index is 35.82 kg/m².  Physical Exam  Vitals and nursing note reviewed.   Constitutional:       General: She is not in acute distress.     Appearance: She is obese. She is ill-appearing. She is not toxic-appearing.      Comments: Chronically ill appearing   HENT:      Head: Normocephalic.      Mouth/Throat:      Mouth: Mucous membranes are moist.   Eyes:      Conjunctiva/sclera: Conjunctivae normal.   Cardiovascular:      Rate and Rhythm: Normal rate and regular rhythm.   Pulmonary:      Effort: Pulmonary effort is normal. No respiratory distress.      Breath sounds: No wheezing or rales.   Abdominal:      General: Bowel sounds are normal. There is no distension.      Palpations: Abdomen is soft.   Musculoskeletal:      Cervical back: Neck supple.      Right lower leg: No edema.      Left lower leg: No edema.   Skin:     General: Skin is  warm and dry.   Neurological:      Mental Status: She is alert and oriented to person, place, and time.   Psychiatric:         Mood and Affect: Mood normal.         Behavior: Behavior normal.         Consultants     Consult Orders (all) (From admission, onward)     Start     Ordered    03/13/23 0751  Inpatient Anesthesia Pain Management Consult  Once        Specialty:  Pain Medicine  Provider:  (Not yet assigned)    03/13/23 0750    03/10/23 1044  Inpatient Urology Consult  Once        Specialty:  Urology  Provider:  Hamzah Darling MD    03/10/23 1044    03/09/23 0000  Inpatient Anesthesia Pain Management Consult  Once        Specialty:  Pain Medicine  Provider:  (Not yet assigned)    03/08/23 1342    03/08/23 0533  Hematology & Oncology Inpatient Consult  Once        Specialty:  Hematology and Oncology  Provider:  Pablo Sherman Jr., MD    03/08/23 0532    03/07/23 1436  Neurosurgery (on-call MD unless specified)  Once        Specialty:  Neurosurgery  Provider:  (Not yet assigned)    03/07/23 1435    03/07/23 1339  [facility] (on-call MD unless specified) Details  Once        Specialty:  Hospitalist  Provider:  (Not yet assigned)    03/07/23 1339    03/07/23 1339  Hematology and Oncology (on-call MD unless specified)  Once        Specialty:  Hematology and Oncology  Provider:  Pablo Sherman Jr., MD    03/07/23 1339              Procedures     * Surgery not found *      Imaging Results (All)     Procedure Component Value Units Date/Time    FL Guided Pain Management SI Joint [[id number]] Resulted: 03/13/23 1250     Updated: 03/13/23 1307    CT Chest Without Contrast Diagnostic [[id number]] Collected: 03/12/23 1757     Updated: 03/13/23 0734    Narrative:      CT CHEST WITHOUT CONTRAST     HISTORY: 89-year-old female with right-sided chest pain.     TECHNIQUE: Radiation dose reduction techniques were utilized, including  automated exposure control and exposure modulation based on body size.   3 mm images were obtained  through the chest without the administration  of IV contrast. Compared with chest CT 08/07/2022.     FINDINGS: A few of the right anterior lateral ribs have a buckled  appearance and this was not present previously as seen at the anterior  aspect of the right 6th rib image 55. No definitive acute fractures are  seen. There are minimal bilateral pleural effusions and very mild  dependent atelectatic change. There is a calcified granuloma within the  ectatic bronchus at the lateral segment of the right middle lobe with  some adjacent scarring. There is no lymphadenopathy within the chest.  There is a very large left thyroid lobe goiter which extends into the  mediastinum and has moderate mass effect on the trachea to the right.  Overall appearance is stable, series 2 image 13. Extensive coronary  artery calcifications and mitral annulus calcifications are noted. AVR  is noted.       Impression:      1. There is a buckled contour of some of the right anterior lateral  right ribs which was not present on the previous CT 08/07/2022, but are  otherwise age-indeterminate. Some of these may possibly represent acute  buckle fractures, but healing or old fractures are favored.  2. Minimal bilateral pleural effusions     This report was finalized on 3/13/2023 7:31 AM by Dr. Terri Stoner M.D.       CT Abdomen Pelvis Stone Protocol [[id number]] Collected: 03/09/23 1555     Updated: 03/09/23 1609    Narrative:      CT ABDOMEN AND PELVIS WITH IV CONTRAST     HISTORY: Neurogenic bladder. Abdominal pain.     TECHNIQUE: Radiation dose reduction techniques were utilized, including  automated exposure control and exposure modulation based on body size.   3 mm images were obtained through the abdomen and pelvis after the  administration of IV contrast.      COMPARISON: MRI 03/07/2023, 10/16/2022     FINDINGS:  Lower chest: The heart size is stable. Dense calcification about the  mitral valve. Atherosclerosis thoracoabdominal aorta and  branch vessels.  Bibasilar atelectasis with trace pleural fluid slightly increased from  the prior. Pleural and parenchymal scarring. Calcified granulomas.  Recommend follow-up to resolution after treatment. Subpleural nodularity  in the lingula similar to the prior.     Liver: Hepatic steatosis with morphologic changes of chronic liver  disease. Evaluation for underlying lesions limited without contrast.     Biliary tract: Cholecystectomy.     Spleen: Calcification along the lateral margin similar to the prior  likely the sequela of prior trauma or infection.     Pancreas: Within normal limits.     Adrenals: Within normal limits.     Kidneys: Stable subcentimeter hypodense lesion upper pole left kidney  may represent a hemorrhagic or proteinaceous cyst however, solid lesion  not entirely excluded. Subsequent follow-up with ultrasound or  multiphase imaging could be considered. Right renal sinus cyst.  Intrarenal vascular calcifications. No hydroureteronephrosis.     Bowel:  Duodenal diverticulum. Small bowel loops are normal in caliber  without obstruction. Moderate colonic stool burden. Scattered colonic  diverticulosis without acute diverticulitis. Redundant colon. Ensure  up-to-date with colonoscopy. Postsurgical changes ventral abdominal wall  suggesting prior hernia repair. Fat-containing superimposed  periumbilical hernia similar to the prior.     Peritoneum: Within normal limits.     Vasculature:    Extensive atherosclerosis thoracoabdominal aorta and  branch vessels with severe stenosis of the celiac, superior mesenteric,  and bilateral renal origins.     Lymph Nodes:  Scattered subcentimeter nodes.                                                            Pelvic organs: Distended urinary bladder similar to the prior.  Hysterectomy. Extensive vascular calcifications. Phleboliths.     Abdominal/Pelvic Wall: Postsurgical scarring ventral abdominal wall..     Bones: Diffuse bone demineralization with  multilevel degenerative  changes. Compression fracture L5 vertebral body with sclerosis similar  in appearance to the prior MRI. Please refer to that exam for further  detail. No retropulsion..       Impression:      1.  No acute intra-abdominal or pelvic process.  2.  Distended urinary bladder extending to just below the umbilicus.  3.  Increasing bibasilar airspace disease favoring atelectasis with  trace pleural fluid. Recommend follow-up to resolution.  4.  L5 compression fractures similar to the MRI of 03/07/2023.  5.  Please see above for additional chronic findings/recommendations.            This report was finalized on 3/9/2023 4:05 PM by Dr. Gisell Gonzalez M.D.       MRI Lumbar Spine Without Contrast [[id number]] Collected: 03/07/23 2012     Updated: 03/08/23 0853    Narrative:      MRI OF THE LUMBAR SPINE WITHOUT CONTRAST ON 03/07/2023     CLINICAL HISTORY: Patient has bilateral low back pain, history of fall,  L5 fracture from coughing has thrombocytopenia and history of uterine  cancer.      TECHNIQUE: Sagittal T1, proton density and fat-suppressed T2-weighted  images were obtained of the lumbar spine. In addition axial T2-weighted  images were obtained from T12 to S2. Thin cut axial T1-weighted images  were obtained angled through the interspaces from T12 to S1.      COMPARISON: This is correlated to a prior lumbar spine MRI from [facility] on 10/17/2022.     FINDINGS: The distal thoracic cord and the conus is normal in signal  intensity. The conus terminates at the level of the inferior body of L2  which is borderline probably lower limits of normal and is unchanged  since 10/17/2022     At T11-12 the disc space and facets are normal with no canal or  foraminal narrowing.     At T12-L1 the disc space and facets are normal with no canal or  foraminal narrowing.     Patient has a levoscoliotic curvature of the lumbar spine, apex at the  L2-3 lumbar level     .At L1-2 there is mild disc  space narrowing and degenerative endplate  changes, 2 mm retrolisthesis of L1 on L2, minimal posterior spurring  facet overgrowth but there is essentially no canal or foraminal  narrowing.     At L2-3 there is disc space narrowing and there are degenerative  endplate changes, minimal posterior spurring and there is mild bilateral  facet overgrowth. There is no canal or left foraminal narrowing. There  is eccentric spurring into the right foramen and to the far right  laterally that mildly narrows the right foramen and the far right  lateral spurs abut the ventral aspect of the right L2 nerve root lateral  to the right foramen     At L3-4 there is moderate bilateral facet overgrowth and there is a 5 to  7 mm degenerative anterolisthesis of L3 with respect to L4. There is  mild canal narrowing and there is mild bilateral bony foraminal  narrowing, far right lateral endplate spurs abut the anteromedial aspect  of the right L3 nerve root at the lateral edge of the right foramen and  far right laterally.     At L4-5 there is mild bilateral facet overgrowth. There is bony fusion  across the facet joints, posterior disc margin is normal there is no  canal or lateral recess or foraminal narrowing.      At L5-S1 there is mild left and there is moderate right facet  overgrowth, fluid in the right facet joint, fluid distending the right  facet joint. Posterior disc margin is normal. There is no canal or  lateral recess narrowing. There is synovial thickening and spurring off  the facets extending to the posterior superior foramina and there is  bulging disc material into the inferior foramina and there is at least  moderate left and moderate-to-severe right foraminal narrowing that  could compromise the exiting L5 nerve roots.      This patient has a compression fracture involving the L5 vertebra. On  prior MRI 10/17/2022 there is a horizontal compression fracture  involving the mid-to-superior body of L5 with a fluid cleft  tracking in  the superior body of L5 as well as in the right inferior body of L5.  There was only 10-20% loss of vertebral body height. I believe there has  been worsening of the compression fracture. There is a persistent fluid  cleft tracking the superior body of L5 and the right inferior body of  L5. There is more prominent can cavity of the inferior endplate of L5  and there has been further loss of vertebral body height and currently  there is 25% loss of anterior, 30% loss of central and 25% loss of  posterior vertebral body height. There has been worsening loss of the  vertebral body height. There is 25% loss of anterior, 30% loss central,  25% loss of posterior vertebral body height.     On the sagittal T2-weighted images there is evidence of prominent  distention of the urinary bladder with urine.       Impression:      1. On the prior MRI of the lumbar spine on 10/17/2022 there was an acute  compression fracture involving the superior body of L5 with a fluid  cleft tracking anteroposteriorly through the superior body of the L5  vertebra and there was an acute fracture involving the right inferior  body of L5 with a fluid cleft in the right inferior body of L5 vertebra.  The compression fracture has slightly worsened and there is a persistent  fluid cleft in the right inferior body of L5 and a horizontal fluid  cleft in the body of L5 and now there is 25% loss of the anterior  posterior vertebral body height and there is persistent marrow edema  throughout the compressed L5 vertebra. Furthermore, the fracture plane  extends through the left posterior body of L5 just anterior territory of  the left pedicle inserts on the left posterior body of L5. There is been  further loss of height of the neural foramen at L5-S1. There is spurring  off the facets and the posterior foramina and there is bulging disc  material into the inferior foramina and there is at least moderate left  and there is moderate-to-severe  right foraminal narrowing at L5-S1 that  could affect the exiting L5 nerve roots. The fracture could be more  comprehensively assessed with a CT of the lumbar spine if clinically  indicated.  2. At L3-4 there is moderate bilateral facet overgrowth and a 5 mm  anterolisthesis of L3 on L4 and there is mild canal and bilateral  foraminal narrowing. At L2-3 there is mild posterior spurring but no  canal narrowing and there is eccentric spurring into the right foramen  and to the far right laterally mildly narrows the right foramen and the  far right lateral spurs abut the ventral aspect of the right L2 nerve  root lateral to the right foramen  3. There is prominent distention of the urinary bladder with urine.  Correlate this clinically.     This report was finalized on 3/8/2023 8:50 AM by Dr. Ovidio Sorto M.D.       XR Chest 1 View [[id number]] Collected: 03/07/23 1249     Updated: 03/07/23 1300    Narrative:      XR CHEST 1 VW-, XR SPINE LUMBAR COMPLETE 4+VW-, XR PELVIS 1 OR 2 VW-     Clinical: Low back and bilateral hip pain with a history of L5 fracture,  chest congestion     COMPARISON chest radiograph 12/21/2022, lumbar spine 12/7/2022 and AP  pelvis from 12/7/2022     Chest findings: There are median sternotomy wires in position as before.  Atherosclerotic calcification of the aorta. There are some prominence of  the thoracic inlet in part related to apical lordotic projection. There  is cardiomegaly. No pulmonary edema, lung consolidation or gross pleural  effusion seen. Calcified benign granulomas demonstrated at the right  lung base. Calcified mitral valve again seen.     CONCLUSION: Cardiomegaly.     AP pelvis findings: There is mild bilateral hip joint narrowing as  before. There is bone hypertrophy demonstrated along the acetabulum,  similar to the previous examination. No avascular necrosis or fracture.  Right and left hemipelvis have a normal appearance. Vascular arterial  calcifications demonstrated  within the soft tissues.     CONCLUSION: Bilateral hip joint degeneration similar to 12/7/2022. No  acute osseous abnormality has developed.     Lumbar spine findings: There is multilevel lumbar disc degeneration  similar to the previous examination.     The L5 vertebral body height appears slightly smaller compared to the  previous examination, this could be projectional or secondary to new  mild compression.     There is lower lumbar facet hypertrophy seen. No spondylolysis nor  spondylolisthesis identified. Spurring and osteophyte formation again  seen.     CONCLUSION: There is degenerative change which is similar to 12/7/2022.  The L5 vertebral body height appears slightly diminished compared to  12/7/2022, which could be related to projectional factors or new mild  compression.     This report was finalized on 3/7/2023 12:57 PM by Dr. Art Fabian M.D.       XR Spine Lumbar Complete 4+VW [[id number]] Collected: 03/07/23 1249     Updated: 03/07/23 1300    Narrative:      XR CHEST 1 VW-, XR SPINE LUMBAR COMPLETE 4+VW-, XR PELVIS 1 OR 2 VW-     Clinical: Low back and bilateral hip pain with a history of L5 fracture,  chest congestion     COMPARISON chest radiograph 12/21/2022, lumbar spine 12/7/2022 and AP  pelvis from 12/7/2022     Chest findings: There are median sternotomy wires in position as before.  Atherosclerotic calcification of the aorta. There are some prominence of  the thoracic inlet in part related to apical lordotic projection. There  is cardiomegaly. No pulmonary edema, lung consolidation or gross pleural  effusion seen. Calcified benign granulomas demonstrated at the right  lung base. Calcified mitral valve again seen.     CONCLUSION: Cardiomegaly.     AP pelvis findings: There is mild bilateral hip joint narrowing as  before. There is bone hypertrophy demonstrated along the acetabulum,  similar to the previous examination. No avascular necrosis or fracture.  Right and left hemipelvis have a  normal appearance. Vascular arterial  calcifications demonstrated within the soft tissues.     CONCLUSION: Bilateral hip joint degeneration similar to 12/7/2022. No  acute osseous abnormality has developed.     Lumbar spine findings: There is multilevel lumbar disc degeneration  similar to the previous examination.     The L5 vertebral body height appears slightly smaller compared to the  previous examination, this could be projectional or secondary to new  mild compression.     There is lower lumbar facet hypertrophy seen. No spondylolysis nor  spondylolisthesis identified. Spurring and osteophyte formation again  seen.     CONCLUSION: There is degenerative change which is similar to 12/7/2022.  The L5 vertebral body height appears slightly diminished compared to  12/7/2022, which could be related to projectional factors or new mild  compression.     This report was finalized on 3/7/2023 12:57 PM by Dr. Art Fabian M.D.       XR Pelvis 1 or 2 View [[id number]] Collected: 03/07/23 1249     Updated: 03/07/23 1300    Narrative:      XR CHEST 1 VW-, XR SPINE LUMBAR COMPLETE 4+VW-, XR PELVIS 1 OR 2 VW-     Clinical: Low back and bilateral hip pain with a history of L5 fracture,  chest congestion     COMPARISON chest radiograph 12/21/2022, lumbar spine 12/7/2022 and AP  pelvis from 12/7/2022     Chest findings: There are median sternotomy wires in position as before.  Atherosclerotic calcification of the aorta. There are some prominence of  the thoracic inlet in part related to apical lordotic projection. There  is cardiomegaly. No pulmonary edema, lung consolidation or gross pleural  effusion seen. Calcified benign granulomas demonstrated at the right  lung base. Calcified mitral valve again seen.     CONCLUSION: Cardiomegaly.     AP pelvis findings: There is mild bilateral hip joint narrowing as  before. There is bone hypertrophy demonstrated along the acetabulum,  similar to the previous examination. No avascular  necrosis or fracture.  Right and left hemipelvis have a normal appearance. Vascular arterial  calcifications demonstrated within the soft tissues.     CONCLUSION: Bilateral hip joint degeneration similar to 12/7/2022. No  acute osseous abnormality has developed.     Lumbar spine findings: There is multilevel lumbar disc degeneration  similar to the previous examination.     The L5 vertebral body height appears slightly smaller compared to the  previous examination, this could be projectional or secondary to new  mild compression.     There is lower lumbar facet hypertrophy seen. No spondylolysis nor  spondylolisthesis identified. Spurring and osteophyte formation again  seen.     CONCLUSION: There is degenerative change which is similar to 12/7/2022.  The L5 vertebral body height appears slightly diminished compared to  12/7/2022, which could be related to projectional factors or new mild  compression.     This report was finalized on 3/7/2023 12:57 PM by Dr. Art Fabian M.D.           Results for orders placed during the hospital encounter of 03/07/23    Duplex Venous Upper Extremity - Right CAR    Interpretation Summary  •  Normal bilateral upper extremity venous duplex scan.    Results for orders placed during the hospital encounter of 07/30/22    Adult Transthoracic Echo Complete W/ Cont if Necessary Per Protocol    Interpretation Summary  · Left ventricular ejection fraction appears to be 61 - 65%. Left ventricular systolic function is normal.  · Left ventricular wall thickness is consistent with mild concentric hypertrophy.  · Left ventricular diastolic function is consistent with (grade II w/high LAP) pseudonormalization.  · Normal right ventricular cavity size and systolic function noted.  · The left atrial cavity is moderately dilated.  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 The aortic valve peak and mean gradients are within defined limits. The prosthetic aortic valve is grossly normal.  ·  There is severe mitral annular calcification. The mitral valve leaflets are thickened. There are several calcified chordae  · Mild mitral valve regurgitation is present. No significant mitral valve stenosis is present.  · Mild to moderate tricuspid valve regurgitation is present.  · Calculated right ventricular systolic pressure from tricuspid regurgitation is 37 mmHg.  · There is no evidence of pericardial effusion    Pertinent Labs     Results from last 7 days   Lab Units 03/14/23  0544 03/13/23  0434 03/12/23  0704 03/11/23  0654   WBC 10*3/mm3 9.68 7.37 5.87 4.58   HEMOGLOBIN g/dL 10.5* 10.4* 10.8* 10.9*   PLATELETS 10*3/mm3 160 132* 100* 77*     Results from last 7 days   Lab Units 03/14/23  0544 03/13/23  0434 03/12/23  0704 03/11/23  0654   SODIUM mmol/L 131* 128* 129* 134*   POTASSIUM mmol/L 4.4 4.6 4.4 4.3   CHLORIDE mmol/L 99 97* 99 104   CO2 mmol/L 19.0* 19.4* 23.0 21.4*   BUN mg/dL 80* 62* 51* 41*   CREATININE mg/dL 1.77* 1.46* 1.33* 1.36*   GLUCOSE mg/dL 246* 220* 244* 170*   EGFR mL/min/1.73 26.7* 33.6* 37.6* 36.6*     Results from last 7 days   Lab Units 03/09/23  0525 03/08/23  0621   ALBUMIN g/dL 3.5 3.6   BILIRUBIN mg/dL 0.5 0.6   ALK PHOS U/L 52 51   AST (SGOT) U/L 12 9   ALT (SGPT) U/L 13 14     Results from last 7 days   Lab Units 03/14/23  0544 03/13/23  0434 03/12/23  0704 03/11/23  0654 03/10/23  0523 03/09/23  0525 03/08/23  0621   CALCIUM mg/dL 8.2 8.9 8.3 8.3   < > 8.0* 8.1*   ALBUMIN g/dL  --   --   --   --   --  3.5 3.6    < > = values in this interval not displayed.       Results from last 7 days   Lab Units 03/07/23  1441   HSTROP T ng/L 67*     Results from last 7 days   Lab Units 03/13/23  1610 03/13/23  0434   SODIUM UR mmol/L 67  --    OSMOLALITY UR mOsm/kg 291  --    URIC ACID mg/dL  --  8.4*         Invalid input(s): LDLCALC          Test Results Pending at Discharge       Discharge Details        Discharge Medications      New Medications      Instructions Start Date    acetaminophen 325 MG tablet  Commonly known as: TYLENOL   650 mg, Oral, Every 4 Hours PRN      dextromethorphan polistirex ER 30 MG/5ML Suspension Extended Release oral suspension  Commonly known as: DELSYM   60 mg, Oral, Every 12 Hours Scheduled      docusate sodium 100 MG capsule   100 mg, Oral, 2 Times Daily      guaiFENesin 600 MG 12 hr tablet  Commonly known as: MUCINEX   1,200 mg, Oral, Every 12 Hours Scheduled      insulin lispro 100 UNIT/ML injection  Commonly known as: ADMELOG  Replaces: insulin lispro 100 UNIT/ML injection   0-9 Units, Subcutaneous, 3 Times Daily Before Meals      sennosides-docusate 8.6-50 MG per tablet  Commonly known as: PERICOLACE   2 tablets, Oral, Nightly         Changes to Medications      Instructions Start Date   gabapentin 600 MG tablet  Commonly known as: NEURONTIN  What changed: how to take this   600 mg, Oral, 2 Times Daily      insulin glargine 100 UNIT/ML injection  Commonly known as: LANGENE SEMGLEE  What changed: how much to take   25 Units, Subcutaneous, Nightly      levothyroxine 50 MCG tablet  Commonly known as: SYNTHROID, LEVOTHROID  What changed:   · medication strength  · when to take this   50 mcg, Oral, Every Early Morning   Start Date: March 15, 2023        Continue These Medications      Instructions Start Date   Advair -21 MCG/ACT inhaler  Generic drug: fluticasone-salmeterol   2 puffs, Inhalation, 2 Times Daily - RT      amLODIPine 2.5 MG tablet  Commonly known as: NORVASC   2.5 mg, Oral, Daily      carvedilol 12.5 MG tablet  Commonly known as: COREG   12.5 mg, Oral, 2 Times Daily      cetirizine 5 MG tablet  Commonly known as: zyrTEC   5 mg, Oral, Daily PRN      cholecalciferol 25 MCG (1000 UT) tablet  Commonly known as: VITAMIN D3   1,000 Units, Oral, Daily      CLARITIN PO   Oral      hydrALAZINE 25 MG tablet  Commonly known as: APRESOLINE   50 mg, Oral, 2 Times Daily      HYDROcodone-acetaminophen 7.5-325 MG per tablet  Commonly known as: NORCO   1  tablet, Oral, Every 6 Hours      lidocaine 5 %  Commonly known as: LIDODERM   1 patch, Transdermal, Every 24 Hours, Remove & Discard patch within 12 hours or as directed by MD      LORazepam 0.5 MG tablet  Commonly known as: ATIVAN   0.5 mg, Oral, Every 8 Hours PRN      methocarbamol 750 MG tablet  Commonly known as: ROBAXIN   750 mg, Oral, Every 8 Hours PRN      montelukast 10 MG tablet  Commonly known as: SINGULAIR   1 tablet, Oral, Nightly      ondansetron 8 MG tablet  Commonly known as: ZOFRAN   8 mg, Oral, As Needed      polyethylene glycol 17 GM/SCOOP powder  Commonly known as: MIRALAX   17 g, Oral, Daily      predniSONE 5 MG tablet  Commonly known as: DELTASONE   10 mg, Oral, Daily With Breakfast      tamsulosin 0.4 MG capsule 24 hr capsule  Commonly known as: FLOMAX   0.4 mg, Oral, Every Night at Bedtime         Stop These Medications    furosemide 40 MG tablet  Commonly known as: LASIX     insulin lispro 100 UNIT/ML injection  Commonly known as: humaLOG  Replaced by: insulin lispro 100 UNIT/ML injection            Allergies   Allergen Reactions   • Erythromycin Unknown (See Comments) and Other (See Comments)     Pt states she does not remember but it was many years ago  Pt states she does not remember but it was many years ago   • Statins Myalgia   • Cephalexin Other (See Comments) and Unknown (See Comments)     June 2022 Pt has tolerated ceftriaxone and cefepime during admission.   Pt states she does not remember reaction but it was many years ago   • Penicillins Rash   • Sulfa Antibiotics Itching and Rash       Discharge Disposition:  Skilled Nursing Facility (DC - External)      Discharge Diet:  Diet Order   Procedures   • Diet: Cardiac Diets, Diabetic Diets, Renal Diets; Healthy Heart (2-3 Na+); Consistent Carbohydrate; Low Sodium (2-3g), Low Potassium, Low Phosphorus; Texture: Regular Texture (IDDSI 7); Fluid Consistency: Thin (IDDSI 0)       Discharge Activity:   Activity Instructions     Activity as  Tolerated            CODE STATUS:    Code Status and Medical Interventions:   Ordered at: 03/07/23 1628     Code Status (Patient has no pulse and is not breathing):    CPR (Attempt to Resuscitate)     Medical Interventions (Patient has pulse or is breathing):    Full Support       Future Appointments   Date Time Provider Department Center   3/21/2023  4:20 PM Pablo Sherman Jr., MD [facility]   4/18/2023  2:20 PM Beth Butcher MD [facility]      Contact information for follow-up providers     Mariah Hickman MD Follow up in 1 week(s).    Specialty: Family Medicine  Why: Follow up 1 week after discharge  Contact information:  [address]  [address]  [facility] [address]  [phone]             Pablo Sherman Jr., MD Follow up on 3/21/2023.    Specialties: Hematology and Oncology, Oncology, Hematology  Contact information:  [address]  [place] [id number]  [phone]             Chanda Pineda APRN Follow up.    Specialties: Nurse Practitioner, Neurosurgery  Why: If symptoms worsen  Contact information:  [address]  [address]  [facility] [address]  [phone]             Hussain Cruz MD. Schedule an appointment as soon as possible for a visit in 2 week(s).    Specialty: Urology  Contact information:  [address]  [address]  [place] [id number]  [phone]                   Contact information for after-discharge care     Destination     [facility] .    Service: Skilled Nursing  Contact information:  [address] [address] [phone]                 Home Medical Care     [facility] .    Service: Home Health Services  Contact information:  [address]  [facility] [address] [phone]                             Time Spent on Discharge:  Greater than 30 minutes      RIGO Cruz  [facility]  03/14/23  13:38  EDT                Electronically signed by Preethi Rodriguez APRN at 03/14/23 6339

## 2023-03-14 NOTE — PLAN OF CARE
Goal Outcome Evaluation:              Outcome Evaluation: (P) Pt agreed to participate in therapy this AM. Pt requires a back brace when she is sitting or standing, Pt has increased kyphosis during ambulation. She ambulated 15' and wanted to sit up in the chair this morning. Pt is CGA for her bed mobility and Deandre for her STS. Pt would benefit from continued therapy to improve her mobility and strength.      ..Patient was not wearing a face mask during this therapy encounter. Therapist used appropriate personal protective equipment including eye protection, mask, and gloves.  Mask used was standard procedure mask. Appropriate PPE was worn during the entire therapy session. Hand hygiene was completed before and after therapy session. Patient is not in enhanced droplet precautions.

## 2023-03-17 LAB
GLUCOSE BLDC GLUCOMTR-MCNC: 301 MG/DL (ref 70–130)
GLUCOSE BLDC GLUCOMTR-MCNC: 447 MG/DL (ref 70–130)
GLUCOSE BLDC GLUCOMTR-MCNC: 471 MG/DL (ref 70–130)

## 2023-03-20 ENCOUNTER — TELEPHONE (OUTPATIENT)
Dept: ONCOLOGY | Facility: CLINIC | Age: 88
End: 2023-03-20
Payer: MEDICARE

## 2023-03-20 NOTE — PROGRESS NOTES
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  The patient is being followed regarding chronic ITP for which she is currently receiving prednisone 10 mg daily.  Unfortunately she is back in rehab facility after recent hospitalization 3/7 - 3/14/2023.  She developed again worsening difficulty with back pain radiating to her bilateral lower extremities affecting her ability to ambulate.  She underwent evaluation with MRI lumbar spine 3/7/2023 that showed slight worsening of L5 compression fracture and further loss of height in the neural foramen at L5/S1 in addition to other degenerative changes producing foraminal narrowing.  CT abdomen and pelvis 3/9/2023 showed distended urinary bladder, hepatic steatosis with cirrhotic liver morphology, no mention of splenomegaly.  CT chest 3/12/2023 showed buckled contour of right anterior lateral ribs, possibly acute buckle fractures, favored healing old fractures.  She had developed swelling in the right upper extremity and Doppler was negative on 3/13/2023.  On admission 3/7/2023, platelet count was 41,000.  Platelet count declined to 38,000 on 3/8/2023 with elevated IPF at 10.9% consistent with her history of chronic ITP.  There was recommendation to pursue epidural injection.  In order to facilitate this, patient received IVIG daily x3 days on 3/9 - 3/11/2023 with a total dose of 85 g.  Platelet count responded nicely, increased up to 132,000 on 3/13/2023 at which time the epidural injection was performed.  On 3/14/2023 prior to discharge, platelet count was 160,000.  Patient was maintained on prednisone 10 mg daily anticipating only transient effect from IVIG with need for ongoing prednisone to maintain her counts in the future.  Patient was discharged to subacute  nursing facility on 3/14/2023.    Additional labs were drawn at the nursing facility and only made available to us yesterday showing platelet count on 3/15/2023 up to 191,000.  Labs were drawn again yesterday, results pending.    The patient has had ongoing difficulty with hyponatremia and hyperkalemia.  These issues are being managed by the physician at the nursing facility following her discharge from the hospital.  Labs on 3/15/2023 showed creatinine 2.0, potassium 5.7, sodium 126.  The patient was apparently placed on a low potassium diet at that point and repeat labs were drawn yesterday, results pending.    Today, the patient and her daughter report that she is continuing to gradually improve in terms of her mobility, working with physical therapy at the nursing facility.  She was able to walk a longer distance today into the hallway.  Her pain is overall somewhat improved after the epidural injection and is more tolerable but does still persist.  She has not experienced any bleeding issues.      Objective   Vital Signs:   There were no vitals taken for this visit.    Physical Exam   Physical exam was not performed today as patient was evaluated via telephone visit.    Result Review : Reviewed hospital records from admission as noted above and below.  Reviewed CBC, BMP from 3/15/2023.       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  · Additional labs 3/3/2022 with positive BECCA at 1: 320 dilution with homogeneous pattern, negative BECCA panel  · On 3/4/2022, B12 low normal at 238, folate greater than 20, negative serum protein electrophoresis and immunoelectrophoresis with free kappa light chain 65.6, free lambda light chain 37.1 and free light chain ratio 1.77 (appropriate for degree of renal dysfunction), LDH borderline elevated 238  · CT abdomen and pelvis 3/8/2022 with liver morphology suspicious for chronic liver disease, spleen normal in size at 10.4 cm.  No other abnormalities.  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    · Unclear whether  improvement in thrombocytopenia was related to steroids, B12 replacement, or improvement in CHF/volume overload.  · Recommended decreasing prednisone dose down to 15 mg daily prior to transition to subacute nursing facility on 3/9/2022.  Patient however was discharged on prednisone 20 mg daily.  Requested that nursing facility forward labs weekly monitor platelet count and further gradually taper prednisone dose however this did not occur.  · On 3/23/2022, platelet count was 64,000.  Tapered prednisone from 20 down to 15 mg daily.  Intend to maintain platelet count above the 50-58935 range.  Consider additional treatment options with IVIG or rituximab if platelet count declines into the 30,000 range or below consistently.  · 3/31/2022 platelet count 65,000 and on 4/4/2022 prednisone was tapered down to 10 mg daily  · On 4/20/2022, platelet count of 96,000 and prednisone tapered down to 5 mg daily.  · Platelet count on 6/15/2022 92,000 with subsequent taper of prednisone to 5 mg every other day  · Platelet count did decline into the 40-31989 range on prednisone 5 mg every other day.  · Patient hospitalized 7/30 - 8/2/2022 due to COVID-19 infection/pneumonia.  She  received dexamethasone alone.  During that time, platelet count was 46,000 on admission 7/30/2022 but increased up to 82,000 the time of discharge on 8/2/2022.    · Hospitalized again 8/5- 8/10/2022 due to symptoms of persistent weakness, fatigue, shortness of breath, cough, congestion following her COVID-19 infection. Bilateral lower extremity Doppler showed bilateral acute calf DVT.  Perfusion scan 8/8/2022 showed no evidence of pulmonary embolism.  Platelet count was in the 90-low 100,000 range and she was therefore anticoagulated initially with Lovenox and transitioned to Eliquis.  Transitioned from dexamethasone which she received for COVID-19 infection back to prednisone at 5 mg every other day.    · Platelet count subsequently decreased into the 60-39354 range.  On 8/19/2022 prednisone increased to 10 mg daily.    · With no improvement in platelet count on 8/22/2022, prednisone dose increased to 15 mg daily.    · Patient was admitted yet again 8/24 - 8/29/2022 related to L5 compression fracture. With concern for ongoing prednisone in the setting of compression fracture, discussion regarding use of Promacta 50 mg daily with potential prednisone taper pending response.  · Patient subsequently transferred to subacute nursing facility.  Per family, obtained Promacta 50 mg daily and provided this to the nursing facility and she began the medication on 9/10/2022.  Promacta discontinued 9/14/2022 with concern regarding potential increased thrombotic risk and risk of increased LFTs.  Elected to maintain patient on low-dose prednisone.  · Prednisone decreased from 15 down to 10 mg daily 9/14/2022.  Platelet count 98,000.  · Patient hospitalized 10/16- 10/20/2022.  On admission platelet count 66,000, on discharge 10/20/2022 platelet count 69,000.  Patient discharged on prednisone 15 mg daily  · Subsequently prednisone was decreased to 10 mg on 12/7/2023.  · Platelet count has subsequently declined into the 40-32991  range.  Patient with no bleeding complications, prednisone maintained at 10 mg daily with plans for ongoing weekly lab monitoring with home health.  · Patient admitted again with back pain 3/7 - 3/14/2023.  Platelet count on 3/7/2023 was 41,000.  Platelet count declined to 38,000 on 3/8/2023 with elevated IPF at 10.9% consistent with her history of chronic ITP.  There was recommendation to pursue epidural injection.  In order to facilitate this, patient received IVIG daily x3 days on 3/9 - 3/11/2023 with a total dose of 85 g.  Platelet count responded, increased up to 132,000 on 3/13/2023 at which time the epidural injection was performed.  On 3/14/2023 prior to discharge, platelet count was 160,000.  Patient was maintained on prednisone 10 mg daily anticipating only transient effect from IVIG with need for ongoing prednisone to maintain her counts in the future.  Patient was discharged to subacute nursing facility on 3/14/2023.  · Additional labs were drawn at the nursing facility and only made available to us yesterday showing platelet count on 3/15/2023 up to 191,000.  Labs were drawn again yesterday, results pending.  The patient today reports that she is improving in terms of her back pain and mobility.  She has not experienced any significant bleeding issues.  She does have easy bruising as expected related to ongoing steroid use and chronic thrombocytopenia.  We will obtain the labs from 3/20/2023 and we will have further recommendations on prednisone dosing based on that result.  In general if platelet count is above 40,000 with no bleeding issues we will maintain her current dose prednisone.  We will need to continue monitoring platelet count on a weekly basis in the nursing facility and via home health once she is discharged.  We will have repeat telephone visit in 8 weeks.    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  Labs during hospitalization were again consistent with anemia secondary to chronic disease on 8/26/2022 with iron 36, ferritin 241, iron saturation 13%, TIBC 276, folate 6.69, B12 417.  Patient was receiving oral iron however was not iron deficient and was experiencing constipation, oral iron discontinued.    · Hemoglobin did decline during hospitalization 3/7 - 3/14/2023 from 13.2 on admission down into the 10-11 range.  Anemia evaluation performed on 3/12/2023 with iron 135, ferritin 295, iron saturation 51%, TIBC 264, folate 5.94, B12 236.  Hemoglobin was 10.5 at discharge.  · Hemoglobin on 3/15/2023 was 10.0.  Given the low normal B12 level on 3/12/2023 we will begin oral B12 1000 mcg daily.  Labs otherwise consistent with anemia secondary to chronic disease/CKD3 and will hopefully improve over time.    *Borderline B12 deficiency  · B12 level on 3/4/2022 was 238  · Patient initiated B12 1000 mcg IM injection daily x3 days and oral B12 1000 mcg daily  · Labs on 4/20/2022 with B12 811  · Patient was briefly receiving oral B12 replacement, not currently receiving B12.  · B12 level on 8/27/2022 was 417.  · B12 during hospitalization 3/12/2023 low normal at 236.  We will begin oral B12 1000 mcg daily.     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  Spleen however was normal in size.  · LFTs normal on 3/2/2022  · CT abdomen and pelvis 3/8/2022 with liver morphology suspicious for chronic liver disease, spleen normal in size at 10.4 cm.  No other abnormalities.  · Significance of the liver appearance on scans is unclear.  · On 3/23/2022, elevated LFTs with ALT 46, AST 40, normal total bilirubin 0.4.  · CT abdomen and pelvis 3/9/2023 with continued evidence of hepatic steatosis and cirrhotic morphology of the liver, no splenomegaly.  · LFTs have remained mildly elevated    *GI prophylaxis  · Patient is currently receiving Pepcid 10 mg twice daily while on steroids    *Pulmonary nodule  · Patient underwent CT scan at [facility] urology on 9/7/2021 that showed a 4 mm subpleural left lower lobe nodule  · Patient was scheduled for follow-up CT chest with thoracic surgery however declined to proceed with the scan.  Given her age and limited ability to treat a malignancy and with a high probability that this is a benign finding, scan was canceled.  · CT chest during hospitalization 8/7/2022 with no mention of pulmonary nodule.  · CT chest 3/12/2023 with no mention of pulmonary nodule.    *Bilateral calf DVT  · Patient was hospitalized 7/30 - 8/2/2022 due to COVID-19 infection/pneumonia.  She received dexamethasone alone.    · She was hospitalized again 8/5- 8/10/2022 due to symptoms of persistent weakness, fatigue, shortness of breath, cough, congestion following her COVID-19 infection.  During that hospitalization, she underwent CT chest 8/7/2022 which showed no significant abnormal findings.  Bilateral lower extremity Doppler showed bilateral acute calf DVT.  Perfusion scan 8/8/2022 showed no evidence of pulmonary embolism.  Platelet count was in the 90-low 100,000 range and she was  therefore anticoagulated initially with Lovenox and transitioned to Eliquis 5 mg twice daily.   · During hospitalization, doppler 10/17/2022 lower extremities showed evolution of her previous bilateral acute calf DVT to subacute on the right and chronic on the left.  Eliquis was held during hospital stay and was not resumed at time of discharge.  · Right upper extremity swelling prompted right upper extremity Doppler on 3/13/2023 which was negative during hospitalization.  · Patient currently remains off anticoagulation.  She is quite sedentary and is certainly at risk for progression of thrombosis.  Platelet count has remained in the 40-60261 range.  Anticoagulation would likely confer significant risk for bleeding in this situation.  There has been no clinical evidence of progressive or recurrent thrombosis.    *L5 compression fracture  · Patient hospitalized 8/24 - 8/29/2022 related to L5 compression fracture.    · CT scans showed compression fracture at L5.    · MRI lumbar spine 8/24/2022 redemonstrated L5 compression fracture.    · Conservative management of compression fracture was recommended by neurosurgery.  · Patient hospitalized with significant pain from L5 compression fracture 10/6 - 10/20/2022. CT scan on 10/16/2022 showed slightly more loss of height in the L5 fracture.  MRI of the lumbar spine on 10/17/2022 showed 20% loss of height at L5 with multiple fracture planes.  She was evaluated by neurosurgery and by pain management.  In light of her thrombocytopenia, it was felt that she was not a candidate for epidural injection.  · Hospitalization 3/7 - 3/14/2023.  She developed again worsening difficulty with back pain radiating to her bilateral lower extremities affecting her ability to ambulate.  MRI lumbar spine 3/7/2023 showed slight worsening of L5 compression fracture and further loss of height in the neural foramen at L5/S1 in addition to other degenerative changes producing foraminal narrowing.   Patient received IVIG to improve platelet count and facilitate epidural injection.  Platelet count increased up to 132,000 on 3/13/2023 at which time the epidural injection was performed. Patient was discharged to subacute nursing facility on 3/14/2023.  · Patient reports pain has improved after epidural injection but does persist.  Mobility has improved, patient continuing to work with physical therapy at the nursing facility.    *Electrolyte disturbances  · Patient with ongoing difficulty with hyponatremia, hyperkalemia.  Labs from 3/15/2023 showed potassium 5.7 and sodium 126.  We are not managing her electrolytes, physician at the nursing facility is managing her other medical conditions including this issue.  Communicated this to the patient and her daughter.    Plan:  1. Continue prednisone 10 mg daily  2. Begin oral B12 1000 mcg daily.  We will communicate this order to the nursing facility.  3. The patient currently remains off of anticoagulation with Eliquis.  Patient felt to have increased risk for bleeding complications on anticoagulation with platelet count in the current range  4. We will obtain recent platelet count from 3/20/2023 and communicate any changes in prednisone dosing to the nursing facility.  In general, we will plan to increase prednisone dose if the platelet count declines below 40,000 or if any significant bleeding issues occur  5. We will plan ongoing weekly CBC monitoring for now in the nursing facility and subsequently with home health once she is discharged.  6. We are not managing any of her additional medical issues apart from her anemia and thrombocytopenia, electrolyte disturbances being managed by physician at the nursing facility.  7. Telephone visit to be scheduled in 8 weeks.    Patient did consent to telephone visit today.    I did spend 13 minutes in video conversation today.  I did spend a total of 35 minutes in time caring for the patient today with additional time spent  in review of records, video conversation, placement of orders, completion of documentation.

## 2023-03-20 NOTE — TELEPHONE ENCOUNTER
Phoned patient's SNF, [facility], spoke with patient's nurse Laxmi. Asked Laxmi to fax CBC results to our office for patient's upcoming telephone visit with Dr. Sherman. Results from 3/14/23 and 3/20/23 will be faxed.

## 2023-03-21 ENCOUNTER — TELEPHONE (OUTPATIENT)
Dept: ONCOLOGY | Facility: CLINIC | Age: 88
End: 2023-03-21
Payer: MEDICARE

## 2023-03-21 ENCOUNTER — OFFICE VISIT (OUTPATIENT)
Dept: ONCOLOGY | Facility: CLINIC | Age: 88
End: 2023-03-21
Payer: MEDICARE

## 2023-03-21 DIAGNOSIS — D69.6 THROMBOCYTOPENIA: Primary | ICD-10-CM

## 2023-03-21 DIAGNOSIS — D63.8 ANEMIA, CHRONIC DISEASE: ICD-10-CM

## 2023-03-21 PROCEDURE — 99442 PR PHYS/QHP TELEPHONE EVALUATION 11-20 MIN: CPT | Performed by: INTERNAL MEDICINE

## 2023-03-21 PROCEDURE — 1125F AMNT PAIN NOTED PAIN PRSNT: CPT | Performed by: INTERNAL MEDICINE

## 2023-03-21 PROCEDURE — 1159F MED LIST DOCD IN RCRD: CPT | Performed by: INTERNAL MEDICINE

## 2023-03-21 PROCEDURE — 1160F RVW MEDS BY RX/DR IN RCRD: CPT | Performed by: INTERNAL MEDICINE

## 2023-03-21 NOTE — TELEPHONE ENCOUNTER
Phoned patient's SNF, [facility], spoke with patient's nurse Negrita. She confirmed that a CBC was drawn yesterday, however the results were still not available at the time of this phone call. Negrita stated she would fax results to our office once available. Verified that patient's other lab results (BMP) were being addressed by physician at facility.

## 2023-03-22 ENCOUNTER — TELEPHONE (OUTPATIENT)
Dept: ONCOLOGY | Facility: CLINIC | Age: 88
End: 2023-03-22
Payer: MEDICARE

## 2023-03-22 NOTE — TELEPHONE ENCOUNTER
Phoned [facility], spoke with patient's nurse Merlyn. Gave verbal orders per Dr. Sherman to begin patient on oral B12 1000 mcg daily, and to continue weekly CBCs with results faxed to our office while patient is a resident there. Merlyn r/v orders. Stated that CBC from Monday 3/20 had to be re-drawn due to an error with the specimen, and that results will be faxed to our office tomorrow.

## 2023-04-04 ENCOUNTER — APPOINTMENT (OUTPATIENT)
Dept: GENERAL RADIOLOGY | Facility: HOSPITAL | Age: 88
DRG: 291 | End: 2023-04-04
Payer: MEDICARE

## 2023-04-04 ENCOUNTER — HOSPITAL ENCOUNTER (INPATIENT)
Facility: HOSPITAL | Age: 88
LOS: 1 days | Discharge: HOME HEALTH | DRG: 291 | End: 2023-04-07
Attending: EMERGENCY MEDICINE | Admitting: INTERNAL MEDICINE
Payer: MEDICARE

## 2023-04-04 DIAGNOSIS — D64.9 CHRONIC ANEMIA: ICD-10-CM

## 2023-04-04 DIAGNOSIS — R06.00 DYSPNEA, UNSPECIFIED TYPE: ICD-10-CM

## 2023-04-04 DIAGNOSIS — R79.89 ELEVATED BRAIN NATRIURETIC PEPTIDE (BNP) LEVEL: ICD-10-CM

## 2023-04-04 DIAGNOSIS — I50.43 ACUTE ON CHRONIC COMBINED SYSTOLIC AND DIASTOLIC CONGESTIVE HEART FAILURE: Primary | ICD-10-CM

## 2023-04-04 DIAGNOSIS — N18.9 CHRONIC RENAL IMPAIRMENT, UNSPECIFIED CKD STAGE: ICD-10-CM

## 2023-04-04 DIAGNOSIS — Z86.79 HISTORY OF CORONARY ARTERY DISEASE: ICD-10-CM

## 2023-04-04 DIAGNOSIS — R77.8 ELEVATED TROPONIN: ICD-10-CM

## 2023-04-04 LAB
ALBUMIN SERPL-MCNC: 3.3 G/DL (ref 3.5–5.2)
ALBUMIN/GLOB SERPL: 1 G/DL
ALP SERPL-CCNC: 69 U/L (ref 39–117)
ALT SERPL W P-5'-P-CCNC: 36 U/L (ref 1–33)
ANION GAP SERPL CALCULATED.3IONS-SCNC: 8.3 MMOL/L (ref 5–15)
AST SERPL-CCNC: 22 U/L (ref 1–32)
BASOPHILS # BLD AUTO: 0.02 10*3/MM3 (ref 0–0.2)
BASOPHILS NFR BLD AUTO: 0.2 % (ref 0–1.5)
BILIRUB SERPL-MCNC: 0.4 MG/DL (ref 0–1.2)
BUN SERPL-MCNC: 24 MG/DL (ref 8–23)
BUN/CREAT SERPL: 19.8 (ref 7–25)
CALCIUM SPEC-SCNC: 8.4 MG/DL (ref 8.2–9.6)
CHLORIDE SERPL-SCNC: 101 MMOL/L (ref 98–107)
CO2 SERPL-SCNC: 23.7 MMOL/L (ref 22–29)
CREAT SERPL-MCNC: 1.21 MG/DL (ref 0.57–1)
D-LACTATE SERPL-SCNC: 1.5 MMOL/L (ref 0.5–2)
DEPRECATED RDW RBC AUTO: 66 FL (ref 37–54)
EGFRCR SERPLBLD CKD-EPI 2021: 42.1 ML/MIN/1.73
EOSINOPHIL # BLD AUTO: 0.05 10*3/MM3 (ref 0–0.4)
EOSINOPHIL NFR BLD AUTO: 0.6 % (ref 0.3–6.2)
ERYTHROCYTE [DISTWIDTH] IN BLOOD BY AUTOMATED COUNT: 18.7 % (ref 12.3–15.4)
GLOBULIN UR ELPH-MCNC: 3.4 GM/DL
GLUCOSE BLDC GLUCOMTR-MCNC: 153 MG/DL (ref 70–130)
GLUCOSE BLDC GLUCOMTR-MCNC: 177 MG/DL (ref 70–130)
GLUCOSE SERPL-MCNC: 113 MG/DL (ref 65–99)
HCT VFR BLD AUTO: 32.5 % (ref 34–46.6)
HGB BLD-MCNC: 9.9 G/DL (ref 12–15.9)
HOLD SPECIMEN: NORMAL
HOLD SPECIMEN: NORMAL
LYMPHOCYTES # BLD AUTO: 0.84 10*3/MM3 (ref 0.7–3.1)
LYMPHOCYTES NFR BLD AUTO: 10.2 % (ref 19.6–45.3)
MAGNESIUM SERPL-MCNC: 2 MG/DL (ref 1.7–2.3)
MCH RBC QN AUTO: 28.9 PG (ref 26.6–33)
MCHC RBC AUTO-ENTMCNC: 30.5 G/DL (ref 31.5–35.7)
MCV RBC AUTO: 95 FL (ref 79–97)
MONOCYTES # BLD AUTO: 0.51 10*3/MM3 (ref 0.1–0.9)
MONOCYTES NFR BLD AUTO: 6.2 % (ref 5–12)
NEUTROPHILS NFR BLD AUTO: 6.75 10*3/MM3 (ref 1.7–7)
NEUTROPHILS NFR BLD AUTO: 82.4 % (ref 42.7–76)
NT-PROBNP SERPL-MCNC: 7718 PG/ML (ref 0–1800)
PLATELET # BLD AUTO: 144 10*3/MM3 (ref 140–450)
PMV BLD AUTO: 10.3 FL (ref 6–12)
POTASSIUM SERPL-SCNC: 4.6 MMOL/L (ref 3.5–5.2)
PROCALCITONIN SERPL-MCNC: 0.1 NG/ML (ref 0–0.25)
PROT SERPL-MCNC: 6.7 G/DL (ref 6–8.5)
QT INTERVAL: 446 MS
RBC # BLD AUTO: 3.42 10*6/MM3 (ref 3.77–5.28)
SODIUM SERPL-SCNC: 133 MMOL/L (ref 136–145)
TROPONIN T SERPL HS-MCNC: 76 NG/L
WBC NRBC COR # BLD: 8.2 10*3/MM3 (ref 3.4–10.8)
WHOLE BLOOD HOLD COAG: NORMAL
WHOLE BLOOD HOLD SPECIMEN: NORMAL

## 2023-04-04 PROCEDURE — 99232 SBSQ HOSP IP/OBS MODERATE 35: CPT | Performed by: NURSE PRACTITIONER

## 2023-04-04 PROCEDURE — 71045 X-RAY EXAM CHEST 1 VIEW: CPT

## 2023-04-04 PROCEDURE — 93005 ELECTROCARDIOGRAM TRACING: CPT

## 2023-04-04 PROCEDURE — 83605 ASSAY OF LACTIC ACID: CPT | Performed by: EMERGENCY MEDICINE

## 2023-04-04 PROCEDURE — 99285 EMERGENCY DEPT VISIT HI MDM: CPT

## 2023-04-04 PROCEDURE — 84145 PROCALCITONIN (PCT): CPT

## 2023-04-04 PROCEDURE — 83735 ASSAY OF MAGNESIUM: CPT | Performed by: EMERGENCY MEDICINE

## 2023-04-04 PROCEDURE — 25010000002 ONDANSETRON PER 1 MG: Performed by: INTERNAL MEDICINE

## 2023-04-04 PROCEDURE — 82962 GLUCOSE BLOOD TEST: CPT

## 2023-04-04 PROCEDURE — 83880 ASSAY OF NATRIURETIC PEPTIDE: CPT

## 2023-04-04 PROCEDURE — 84484 ASSAY OF TROPONIN QUANT: CPT

## 2023-04-04 PROCEDURE — 94640 AIRWAY INHALATION TREATMENT: CPT

## 2023-04-04 PROCEDURE — 93010 ELECTROCARDIOGRAM REPORT: CPT | Performed by: INTERNAL MEDICINE

## 2023-04-04 PROCEDURE — 85025 COMPLETE CBC W/AUTO DIFF WBC: CPT

## 2023-04-04 PROCEDURE — 63710000001 INSULIN LISPRO (HUMAN) PER 5 UNITS: Performed by: INTERNAL MEDICINE

## 2023-04-04 PROCEDURE — 25010000002 HEPARIN (PORCINE) PER 1000 UNITS: Performed by: INTERNAL MEDICINE

## 2023-04-04 PROCEDURE — 25010000002 FUROSEMIDE PER 20 MG: Performed by: EMERGENCY MEDICINE

## 2023-04-04 PROCEDURE — G0378 HOSPITAL OBSERVATION PER HR: HCPCS

## 2023-04-04 PROCEDURE — 80053 COMPREHEN METABOLIC PANEL: CPT

## 2023-04-04 RX ORDER — HYDROCODONE BITARTRATE AND ACETAMINOPHEN 7.5; 325 MG/1; MG/1
1 TABLET ORAL EVERY 6 HOURS
Status: DISCONTINUED | OUTPATIENT
Start: 2023-04-04 | End: 2023-04-07 | Stop reason: HOSPADM

## 2023-04-04 RX ORDER — LEVOTHYROXINE SODIUM 0.05 MG/1
50 TABLET ORAL
Status: DISCONTINUED | OUTPATIENT
Start: 2023-04-05 | End: 2023-04-07 | Stop reason: HOSPADM

## 2023-04-04 RX ORDER — PANTOPRAZOLE SODIUM 40 MG/1
40 TABLET, DELAYED RELEASE ORAL DAILY
COMMUNITY
End: 2023-04-18

## 2023-04-04 RX ORDER — ONDANSETRON 2 MG/ML
4 INJECTION INTRAMUSCULAR; INTRAVENOUS EVERY 6 HOURS PRN
Status: DISCONTINUED | OUTPATIENT
Start: 2023-04-04 | End: 2023-04-07 | Stop reason: HOSPADM

## 2023-04-04 RX ORDER — INSULIN LISPRO 100 [IU]/ML
0-9 INJECTION, SOLUTION INTRAVENOUS; SUBCUTANEOUS
Status: DISCONTINUED | OUTPATIENT
Start: 2023-04-04 | End: 2023-04-07 | Stop reason: HOSPADM

## 2023-04-04 RX ORDER — AMOXICILLIN 250 MG
2 CAPSULE ORAL NIGHTLY
Status: DISCONTINUED | OUTPATIENT
Start: 2023-04-04 | End: 2023-04-07 | Stop reason: HOSPADM

## 2023-04-04 RX ORDER — FUROSEMIDE 40 MG/1
40 TABLET ORAL DAILY
COMMUNITY

## 2023-04-04 RX ORDER — MONTELUKAST SODIUM 10 MG/1
10 TABLET ORAL NIGHTLY
Status: DISCONTINUED | OUTPATIENT
Start: 2023-04-04 | End: 2023-04-07 | Stop reason: HOSPADM

## 2023-04-04 RX ORDER — DEXTROSE MONOHYDRATE 25 G/50ML
25 INJECTION, SOLUTION INTRAVENOUS
Status: DISCONTINUED | OUTPATIENT
Start: 2023-04-04 | End: 2023-04-07 | Stop reason: HOSPADM

## 2023-04-04 RX ORDER — NITROGLYCERIN 0.4 MG/1
0.4 TABLET SUBLINGUAL
Status: DISCONTINUED | OUTPATIENT
Start: 2023-04-04 | End: 2023-04-07 | Stop reason: HOSPADM

## 2023-04-04 RX ORDER — FUROSEMIDE 10 MG/ML
40 INJECTION INTRAMUSCULAR; INTRAVENOUS EVERY 12 HOURS
Status: DISCONTINUED | OUTPATIENT
Start: 2023-04-05 | End: 2023-04-06

## 2023-04-04 RX ORDER — IBUPROFEN 600 MG/1
1 TABLET ORAL
Status: DISCONTINUED | OUTPATIENT
Start: 2023-04-04 | End: 2023-04-07 | Stop reason: HOSPADM

## 2023-04-04 RX ORDER — ACETAMINOPHEN 650 MG/1
650 SUPPOSITORY RECTAL EVERY 4 HOURS PRN
Status: DISCONTINUED | OUTPATIENT
Start: 2023-04-04 | End: 2023-04-07 | Stop reason: HOSPADM

## 2023-04-04 RX ORDER — SODIUM CHLORIDE 0.9 % (FLUSH) 0.9 %
10 SYRINGE (ML) INJECTION EVERY 12 HOURS SCHEDULED
Status: DISCONTINUED | OUTPATIENT
Start: 2023-04-04 | End: 2023-04-07 | Stop reason: HOSPADM

## 2023-04-04 RX ORDER — LORAZEPAM 0.5 MG/1
0.5 TABLET ORAL EVERY 8 HOURS PRN
Status: DISCONTINUED | OUTPATIENT
Start: 2023-04-04 | End: 2023-04-07 | Stop reason: HOSPADM

## 2023-04-04 RX ORDER — SODIUM CHLORIDE 0.9 % (FLUSH) 0.9 %
10 SYRINGE (ML) INJECTION AS NEEDED
Status: DISCONTINUED | OUTPATIENT
Start: 2023-04-04 | End: 2023-04-07 | Stop reason: HOSPADM

## 2023-04-04 RX ORDER — BUDESONIDE AND FORMOTEROL FUMARATE DIHYDRATE 160; 4.5 UG/1; UG/1
2 AEROSOL RESPIRATORY (INHALATION)
Status: DISCONTINUED | OUTPATIENT
Start: 2023-04-04 | End: 2023-04-07 | Stop reason: HOSPADM

## 2023-04-04 RX ORDER — NICOTINE POLACRILEX 4 MG
15 LOZENGE BUCCAL
Status: DISCONTINUED | OUTPATIENT
Start: 2023-04-04 | End: 2023-04-07 | Stop reason: HOSPADM

## 2023-04-04 RX ORDER — ACETAMINOPHEN 160 MG/5ML
650 SOLUTION ORAL EVERY 4 HOURS PRN
Status: DISCONTINUED | OUTPATIENT
Start: 2023-04-04 | End: 2023-04-07 | Stop reason: HOSPADM

## 2023-04-04 RX ORDER — SODIUM CHLORIDE 9 MG/ML
40 INJECTION, SOLUTION INTRAVENOUS AS NEEDED
Status: DISCONTINUED | OUTPATIENT
Start: 2023-04-04 | End: 2023-04-07 | Stop reason: HOSPADM

## 2023-04-04 RX ORDER — FUROSEMIDE 10 MG/ML
80 INJECTION INTRAMUSCULAR; INTRAVENOUS ONCE
Status: COMPLETED | OUTPATIENT
Start: 2023-04-04 | End: 2023-04-04

## 2023-04-04 RX ORDER — ACETAMINOPHEN 325 MG/1
650 TABLET ORAL EVERY 4 HOURS PRN
Status: DISCONTINUED | OUTPATIENT
Start: 2023-04-04 | End: 2023-04-07 | Stop reason: HOSPADM

## 2023-04-04 RX ORDER — HEPARIN SODIUM 5000 [USP'U]/ML
5000 INJECTION, SOLUTION INTRAVENOUS; SUBCUTANEOUS EVERY 12 HOURS SCHEDULED
Status: DISCONTINUED | OUTPATIENT
Start: 2023-04-04 | End: 2023-04-07 | Stop reason: HOSPADM

## 2023-04-04 RX ORDER — CARVEDILOL 12.5 MG/1
12.5 TABLET ORAL 2 TIMES DAILY WITH MEALS
Status: DISCONTINUED | OUTPATIENT
Start: 2023-04-04 | End: 2023-04-07 | Stop reason: HOSPADM

## 2023-04-04 RX ORDER — GABAPENTIN 300 MG/1
600 CAPSULE ORAL EVERY 12 HOURS SCHEDULED
Status: DISCONTINUED | OUTPATIENT
Start: 2023-04-04 | End: 2023-04-07 | Stop reason: HOSPADM

## 2023-04-04 RX ADMIN — BUDESONIDE AND FORMOTEROL FUMARATE DIHYDRATE 2 PUFF: 160; 4.5 AEROSOL RESPIRATORY (INHALATION) at 19:32

## 2023-04-04 RX ADMIN — INSULIN GLARGINE-YFGN 20 UNITS: 100 INJECTION, SOLUTION SUBCUTANEOUS at 21:18

## 2023-04-04 RX ADMIN — GABAPENTIN 600 MG: 300 CAPSULE ORAL at 21:18

## 2023-04-04 RX ADMIN — HYDROCODONE BITARTRATE AND ACETAMINOPHEN 1 TABLET: 7.5; 325 TABLET ORAL at 17:36

## 2023-04-04 RX ADMIN — INSULIN LISPRO 2 UNITS: 100 INJECTION, SOLUTION INTRAVENOUS; SUBCUTANEOUS at 17:36

## 2023-04-04 RX ADMIN — Medication 10 ML: at 21:18

## 2023-04-04 RX ADMIN — ONDANSETRON 4 MG: 2 INJECTION INTRAMUSCULAR; INTRAVENOUS at 21:17

## 2023-04-04 RX ADMIN — LORAZEPAM 0.5 MG: 0.5 TABLET ORAL at 21:18

## 2023-04-04 RX ADMIN — DOCUSATE SODIUM 50MG AND SENNOSIDES 8.6MG 2 TABLET: 8.6; 5 TABLET, FILM COATED ORAL at 21:18

## 2023-04-04 RX ADMIN — CARVEDILOL 12.5 MG: 12.5 TABLET, FILM COATED ORAL at 21:18

## 2023-04-04 RX ADMIN — HEPARIN SODIUM 5000 UNITS: 5000 INJECTION INTRAVENOUS; SUBCUTANEOUS at 21:17

## 2023-04-04 RX ADMIN — FUROSEMIDE 80 MG: 10 INJECTION, SOLUTION INTRAMUSCULAR; INTRAVENOUS at 14:17

## 2023-04-04 RX ADMIN — MONTELUKAST SODIUM 10 MG: 10 TABLET, FILM COATED ORAL at 21:18

## 2023-04-04 NOTE — CONSULTS
[facility]  Cardiology Consult Note    Patient Identification:  Name: Helena Carmen  Age: 89 y.o.  Sex: female  :  1931  MRN: [id number]             Requesting Physician:   Carloz Tee MD     Reason for Consultation / Chief Complaint:  Shortness of breath, nausea    History of Present Illness:   This is a 89 year old female, who is new to this provider. She follows Dr. Butcher. She has a history of CKD 3B (follows Dr. Art Wick), HFpEF (not able to tolerate jardiance or farxiga due to UTI and aldactone due to hola), thrombocytopenia (follows hematology), aortic valve stenosis s/p tissue repair, hiatal hernia, hypertension, hypothyroidism,  and  Diabetes mellitis. She states she has been having shortness of breath with cough, ble swelling, and with nausea for about 2 weeks. She states she does not add extra salt to her food however she thinks she could manage her diet better.  Troponin's a76 and Lasix 80 mg given while in ER. No chest pain.     She recently seen Sima SIERRA on 3/2/23 and no changes were made at the time as she was stable.    Recently admitted on 3/7/23-3/114/23 for back pain with left sided sciatica, rib fracture chronic ITP, and 3/13/23 lumbar CAT. Noted hyponatremia, resolved prior to discharge.    Chest xray on admission mild bilateral basilar atelectasis/infiltrate/effusion. Borderline heart size with mild pulmonary vascular congestion. ECG showed SR.Troponin 76, bnp 7718, gfr 42.1, and hg 9.9.    Comorbid cardiac risk factors: DM, hypertension, ckd, hyperlipidemia.    Past Medical History:  Past Medical History:   Diagnosis Date   • Aortic valve stenosis     s/p tissue AVR   • Back pain    • CKD (chronic kidney disease)    • Colitis due to Clostridioides difficile 2022   • Diastolic dysfunction     Grade 2 per echocardiogram    • Diverticulosis    • Exertional shortness of breath    • Heart disease    • Hiatal hernia    • Hyperlipidemia    •  Hypertension    • Hyperthyroidism    • Hypertriglyceridemia 2018   • Hypothyroidism    • L5 compression fracture (HCC) 2022   • Left ventricular hypertrophy    • Legally blind    • Liver disease    • Low back pain    • Macular degeneration    • Mitral regurgitation    • Osteoarthritis of hip    • Osteoporosis    • Pancreatitis 2022   • Paroxysmal atrial fibrillation    • Peripheral neuropathy    • Premature ventricular contractions    • Pulmonary hypertension    • Renal insufficiency syndrome    • Type 2 diabetes mellitus    • Uterine cancer      Past Surgical History:  Past Surgical History:   Procedure Laterality Date   • AORTIC VALVE REPAIR/REPLACEMENT     • CATARACT EXTRACTION      ,    • CHOLECYSTECTOMY     • ENDOSCOPY  08/15/2014    no gross lesions in stomach/duodenum, erythrematous mucosa in stomach   • EPIDURAL BLOCK     • HYSTERECTOMY     • STERNOTOMY        Allergies:  Allergies   Allergen Reactions   • Erythromycin Unknown (See Comments) and Other (See Comments)     Pt states she does not remember but it was many years ago  Pt states she does not remember but it was many years ago   • Statins Myalgia   • Cephalexin Other (See Comments) and Unknown (See Comments)     2022 Pt has tolerated ceftriaxone and cefepime during admission.   Pt states she does not remember reaction but it was many years ago   • Penicillins Rash   • Sulfa Antibiotics Itching and Rash     Home Meds:  (Not in a hospital admission)    Current Meds:      Social History:   Social History     Tobacco Use   • Smoking status: Former     Packs/day: 0.50     Types: Cigarettes     Quit date: 7/10/1969     Years since quittin.7   • Smokeless tobacco: Never   Substance Use Topics   • Alcohol use: No     Comment: caffeine use - coffee 2 cups daily      Family History:  Family History   Problem Relation Age of Onset   • Heart disease Mother    • Hypertension Mother    • Stroke Mother    • Diabetes Mother          mellitus   • Other Other         cardiovascular disorder        Review of Systems    Constitutional: + weakness,fatigue, no fever, rigors, chills   Eyes: No vision changes, eye pain   ENT/oropharynx: No difficulty swallowing, sore throat, epistaxis, changes in hearing   Cardiovascular: No  chest pain, chest tightness, palpitations, paroxysmal nocturnal dyspnea, orthopnea, diaphoresis, dizziness / syncopal episode   Respiratory: + shortness of breath, dyspnea on exertion,  cough, no wheezing hemoptysis   Gastrointestinal: No abdominal pain, nausea, vomiting, diarrhea, bloody stools   Genitourinary: No hematuria, dysuria   Neurological: No headache, tremors, numbness,  one-sided weakness    Musculoskeletal: No cramps, myalgias,  joint pain, joint swelling   Integument: No rash, edema           Constitutional:  Temp:  [97.4 °F (36.3 °C)] 97.4 °F (36.3 °C)  Heart Rate:  [57-61] 61  Resp:  [16] 16  BP: (123-167)/(55-76) 152/56    Physical Exam   General:  Appears in no acute distress, resting in bed with family at bedside  Eyes: eom normal and no conjunctival drainage.  HEENT:  No JVD. Thyroid not visibly enlarged. No mucosal pallor or cyanosis  Respiratory: Respirations regular and unlabored at rest. BBS with good air entry in all fields. + crackles, no rubs or wheezes auscultated  Cardiovascular: S1S2 Regular rate and rhythm. + murmur, no rub or gallop auscultated. No carotid bruits. DP/PT pulses    . 1-2+ pretibial pitting edema  Gastrointestinal: Abdomen soft, flat, non tender. Bowel sounds present. No hepatosplenomegaly. No ascites  Musculoskeletal: DIETRICH x4. No abnormal movements  Extremities: No digital clubbing or cyanosis  Skin:  Skin warm and dry to touch. No rashes  No xanthoma  Neuro: AAO x3 CN II-XII grossly intact              Cardiographics    Echocardiogram:   Interpretation Summary    • Left ventricular ejection fraction appears to be 61 - 65%. Left ventricular systolic function is normal.  • Left  ventricular wall thickness is consistent with mild concentric hypertrophy.  • Left ventricular diastolic function is consistent with (grade II w/high LAP) pseudonormalization.  • Normal right ventricular cavity size and systolic function noted.  • The left atrial cavity is moderately dilated.  • There is a bioprosthetic aortic valve present. The aortic valve peak and mean gradients are within defined limits. The prosthetic aortic valve is grossly normal.  • There is severe mitral annular calcification. The mitral valve leaflets are thickened. There are several calcified chordae  • Mild mitral valve regurgitation is present. No significant mitral valve stenosis is present.  • Mild to moderate tricuspid valve regurgitation is present.  • Calculated right ventricular systolic pressure from tricuspid regurgitation is 37 mmHg.  • There is no evidence of pericardial effusion       Imaging  Chest X-ray:   FINDINGS: The heart size is borderline. Aorta is tortuous, calcified.  Sternotomy wires are present. Pulmonary vasculature is mildly congested.  Mild bilateral basilar atelectasis/infiltrate/effusion. No pneumothorax.  No acute osseous process.     IMPRESSION:  Mild bilateral basilar atelectasis/infiltrate/effusion,  follow-up recommended. Borderline heart size with mild pulmonary  vascular congestion. Tortuous aorta.     This report was finalized on 4/4/2023 12:11 PM by Dr. Donald Hernandez M.D.       Lab Review   Results from last 7 days   Lab Units 04/04/23  1125   HSTROP T ng/L 76*     Results from last 7 days   Lab Units 04/04/23  1125   MAGNESIUM mg/dL 2.0     Results from last 7 days   Lab Units 04/04/23  1125   SODIUM mmol/L 133*   POTASSIUM mmol/L 4.6   BUN mg/dL 24*   CREATININE mg/dL 1.21*   CALCIUM mg/dL 8.4        Results from last 7 days   Lab Units 04/04/23  1125   WBC 10*3/mm3 8.20   HEMOGLOBIN g/dL 9.9*   HEMATOCRIT % 32.5*   PLATELETS 10*3/mm3 144       Intake/Output Summary (Last 24 hours) at  4/4/2023 1545  Last data filed at 4/4/2023 1223  Gross per 24 hour   Intake --   Output 800 ml   Net -800 ml           Assessment:  Acute on chronic diastolic CHF with preserved EF .   hypertension  hyperlipidemia  CKD  History of DVT  NSVT: continue BB  Severe aortic stenosis s/p [place] trifecta bovine pericardial valve (2013)  Obesity    Recommendations / Plan:   This is a 89 year old female, who follows Dr. Butcher. She has a history of AVR (2013), hypertensin, and chf with preserved EF. She presented to [facility] with shortness of breath, discomfort in her throat, nausea, and cough.  She comes in consult chf. I discussed low sodium and fluids with her and her daughter and she stated she may have more sodium than she should at times. She has not been able to maintain GDMT due to increase risk of UTI and history of hola. Her most recent echo 7/31/22 revealed EF 61-65%, LV systolic function was normal. See full report as above. Troponins was 76, which was similar to previous troponin in march. She does not report chest pain.  Monitor creatinine 1.21 today.  I will obtain echo, and do ecg, and troponin in am.     Labs/tests ordered for am: echo, daily weights, lasix 40 mg iv bid, ecg in am    RIGO Zelaya  4/4/2023, 14:36 EDT      EMR Dragon/Transcription:   Dictated utilizing Dragon dictation

## 2023-04-04 NOTE — ED NOTES
Pt to er via ems from home with c/o sob since this morning at 0530am and nausea x2 weeks. Pt on 2L NC found 88 RA. Patient does not wear o2 at baseline. Pt given 4mg zofran iv in route.     Pt has hx of urinary retention and jimenes placed.     Pt and RN wearing mask throughout encounter.

## 2023-04-04 NOTE — ED NOTES
".Nursing report ED to floor  Helena Carmen  89 y.o.  female    HPI :   Chief Complaint   Patient presents with    Shortness of Breath    Nausea       Admitting doctor:   Dheeraj Lynch MD    Admitting diagnosis:   The primary encounter diagnosis was Acute on chronic combined systolic and diastolic congestive heart failure. Diagnoses of History of coronary artery disease, Chronic renal impairment, unspecified CKD stage, Dyspnea, unspecified type, Chronic anemia, Elevated troponin, and Elevated brain natriuretic peptide (BNP) level were also pertinent to this visit.    Code status:   Current Code Status       Date Active Code Status Order ID Comments User Context       Prior            Allergies:   Erythromycin, Statins, Cephalexin, Penicillins, and Sulfa antibiotics    Isolation:   No active isolations    Intake and Output    Intake/Output Summary (Last 24 hours) at 4/4/2023 1426  Last data filed at 4/4/2023 1223  Gross per 24 hour   Intake --   Output 800 ml   Net -800 ml       Weight:       04/04/23  1203   Weight: 76.2 kg (168 lb)       Most recent vitals:   Vitals:    04/04/23 1203 04/04/23 1330 04/04/23 1355 04/04/23 1417   BP:  160/55 152/56 152/56   BP Location:  Left arm Left arm    Patient Position:  Lying Lying    Pulse:  59 58 61   Resp:       Temp:       TempSrc:       SpO2:  95% 93%    Weight: 76.2 kg (168 lb)      Height: 144.8 cm (57\")          Active LDAs/IV Access:   Lines, Drains & Airways       Active LDAs       Name Placement date Placement time Site Days    Peripheral IV 04/04/23 1112 Right Antecubital 04/04/23  1112  Antecubital  less than 1    Urethral Catheter Non-latex;Silicone 16 Fr. 03/10/23  2300  -- 24                    Labs (abnormal labs have a star):   Labs Reviewed   COMPREHENSIVE METABOLIC PANEL - Abnormal; Notable for the following components:       Result Value    Glucose 113 (*)     BUN 24 (*)     Creatinine 1.21 (*)     Sodium 133 (*)     Albumin 3.3 (*)     ALT (SGPT) 36 (*)     " eGFR 42.1 (*)     All other components within normal limits    Narrative:     GFR Normal >60  Chronic Kidney Disease <60  Kidney Failure <15    The GFR formula is only valid for adults with stable renal function between ages 18 and 70.   BNP (IN-HOUSE) - Abnormal; Notable for the following components:    proBNP 7,718.0 (*)     All other components within normal limits    Narrative:     Among patients with dyspnea, NT-proBNP is highly sensitive for the detection of acute congestive heart failure. In addition NT-proBNP of <300 pg/ml effectively rules out acute congestive heart failure with 99% negative predictive value.    Results may be falsely decreased if patient taking Biotin.     SINGLE HSTROPONIN T - Abnormal; Notable for the following components:    HS Troponin T 76 (*)     All other components within normal limits    Narrative:     High Sensitive Troponin T Reference Range:  <10.0 ng/L- Negative Female for AMI  <15.0 ng/L- Negative Male for AMI  >=10 - Abnormal Female indicating possible myocardial injury.  >=15 - Abnormal Male indicating possible myocardial injury.   Clinicians would have to utilize clinical acumen, EKG, Troponin, and serial changes to determine if it is an Acute Myocardial Infarction or myocardial injury due to an underlying chronic condition.        CBC WITH AUTO DIFFERENTIAL - Abnormal; Notable for the following components:    RBC 3.42 (*)     Hemoglobin 9.9 (*)     Hematocrit 32.5 (*)     MCHC 30.5 (*)     RDW 18.7 (*)     RDW-SD 66.0 (*)     Neutrophil % 82.4 (*)     Lymphocyte % 10.2 (*)     All other components within normal limits   MAGNESIUM - Normal   LACTIC ACID, PLASMA - Normal   PROCALCITONIN - Normal    Narrative:     As a Marker for Sepsis (Non-Neonates):    1. <0.5 ng/mL represents a low risk of severe sepsis and/or septic shock.  2. >2 ng/mL represents a high risk of severe sepsis and/or septic shock.    As a Marker for Lower Respiratory Tract Infections that require  "antibiotic therapy:    PCT on Admission    Antibiotic Therapy       6-12 Hrs later    >0.5                Strongly Recommended  >0.25 - <0.5        Recommended   0.1 - 0.25          Discouraged              Remeasure/reassess PCT  <0.1                Strongly Discouraged     Remeasure/reassess PCT    As 28 day mortality risk marker: \"Change in Procalcitonin Result\" (>80% or <=80%) if Day 0 (or Day 1) and Day 4 values are available. Refer to http://[email or web address]-pct-[email or web address]    Change in PCT <=80%  A decrease of PCT levels below or equal to 80% defines a positive change in PCT test result representing a higher risk for 28-day all-cause mortality of patients diagnosed with severe sepsis for septic shock.    Change in PCT >80%  A decrease of PCT levels of more than 80% defines a negative change in PCT result representing a lower risk for 28-day all-cause mortality of patients diagnosed with severe sepsis or septic shock.      RAINBOW DRAW    Narrative:     The following orders were created for panel order [place] Draw.  Procedure                               Abnormality         Status                     ---------                               -----------         ------                     Green Top (Gel)[[id number]]                                  Final result               Lavender Top[[id number]]                                     Final result               Gold Top - SST[[id number]]                                   Final result               Light Blue Top[[id number]]                                   Final result                 Please view results for these tests on the individual orders.   CBC AND DIFFERENTIAL    Narrative:     The following orders were created for panel order CBC & Differential.  Procedure                               Abnormality         Status                     ---------                               -----------         ------                     CBC Auto Differential[[id number]]        " Abnormal            Final result                 Please view results for these tests on the individual orders.   GREEN TOP   LAVENDER TOP   GOLD TOP - SST   LIGHT BLUE TOP       EKG:   ECG 12 Lead ED Triage Standing Order; SOA   Preliminary Result   HEART RATE= 57  bpm   RR Interval= 1053  ms   WV Interval= 154  ms   P Horizontal Axis= 37  deg   P Front Axis= 45  deg   QRSD Interval= 104  ms   QT Interval= 446  ms   QRS Axis= 8  deg   T Wave Axis= 106  deg   - ABNORMAL ECG -   Sinus rhythm   LVH with secondary repolarization abnormality   Electronically Signed By:    Date and Time of Study: 2023 11:53:27          Meds given in ED:   Medications   sodium chloride 0.9 % flush 10 mL (has no administration in time range)   furosemide (LASIX) injection 80 mg (80 mg Intravenous Given 23 1417)       Imaging results:  XR Chest 1 View    Result Date: 2023  Mild bilateral basilar atelectasis/infiltrate/effusion, follow-up recommended. Borderline heart size with mild pulmonary vascular congestion. Tortuous aorta.  This report was finalized on 2023 12:11 PM by Dr. Donald Hernandez M.D.       Ambulatory status:   - Up with assistance x2     Social issues:   Social History     Socioeconomic History    Marital status:    Tobacco Use    Smoking status: Former     Packs/day: 0.50     Types: Cigarettes     Quit date: 7/10/1969     Years since quittin.7    Smokeless tobacco: Never   Vaping Use    Vaping Use: Never used   Substance and Sexual Activity    Alcohol use: No     Comment: caffeine use - coffee 2 cups daily    Drug use: No    Sexual activity: Defer       NIH Stroke Scale:         Loraine Diaz RN  23 14:26 EDT

## 2023-04-04 NOTE — ED PROVIDER NOTES
EMERGENCY DEPARTMENT ENCOUNTER    Room Number:  E667/1  Date of encounter:  4/4/2023  PCP: Mariah Hickman MD  Historian: Patient, family      HPI:  Chief Complaint: Shortness of breath  A complete HPI/ROS/PMH/PSH/SH/FH are unobtainable due to: None    Context: Helena Carmen is a 89 y.o. female who presents to the ED via EMS from home with increased shortness of breath this morning worsened around 5:30 AM.  Has had 2 weeks of nausea.  Has had a cough that seems to be worse in the night and early morning, describes a burning sensation in her throat, does have a history of reflux and states that she has been having those types of symptoms recently.  Has also been having increased swelling in her lower extremities.  Has a history of CHF.  No fevers or chills, no significant chest pains.  No vomiting or diarrhea.  Has been taking Zofran that has helped a little bit.  Breathing was worse today than it has been over the last several weeks.  Currently feeling a bit better than she was this morning.      MEDICAL RECORD REVIEW    External (non-ED) record review: Adult transthoracic echo July 31, 2022 shows ejection fraction of 61 to 65% with grade 2 diastolic dysfunction, bioprosthetic aortic valve present, severe mitral annular calcification, mild mitral valve regurg, mild to moderate tricuspid valve regurgitation    PAST MEDICAL HISTORY  Active Ambulatory Problems     Diagnosis Date Noted   • Aortic valve stenosis 04/05/2016   • Fatigue 04/05/2016   • MI (mitral incompetence) 04/05/2016   • PVC (premature ventricular contraction) 04/05/2016   • Legally blind 05/25/2018   • Essential hypertension 05/25/2018   • Hypertriglyceridemia 05/31/2018   • Pulmonary hypertension 06/25/2020   • Paroxysmal atrial fibrillation 06/25/2020   • Anxiety 07/10/2014   • Stage 4 chronic kidney disease 03/20/2015   • Chronic pain disorder 01/26/2022   • Degeneration of lumbar intervertebral disc 09/09/2014   • Type 2 diabetes mellitus  with hyperglycemia 01/26/2022   • Esophageal reflux 07/10/2014   • Gastroparesis 01/26/2022   • Hiatal hernia 01/26/2022   • Iatrogenic hypothyroidism 07/08/2014   • Macular degeneration 01/26/2022   • Malignant neoplasm of uterus 01/26/2022   • Osteoarthritis of knee 07/10/2014   • Peripheral nerve disease 07/10/2014   • Secondary hyperparathyroidism 04/05/2016   • Thrombocytopenia 07/11/2014   • Chronic heart failure with preserved ejection fraction 03/02/2022   • Other cirrhosis of liver 03/03/2022   • Hypothyroidism 02/22/2022   • Nonrheumatic tricuspid valve regurgitation 02/22/2022   • Anemia, chronic disease 07/30/2022   • Hyponatremia 07/30/2022   • Closed fracture of fifth lumbar vertebra 08/24/2022   • Closed fracture of fifth lumbar vertebra, unspecified fracture morphology, initial encounter 08/25/2022   • Diabetic polyneuropathy associated with type 2 diabetes mellitus 08/26/2022   • Chronic ITP (idiopathic thrombocytopenia) 08/28/2022   • Chronic midline low back pain with bilateral sciatica 10/16/2022   • Acute deep vein thrombosis of calf, bilateral 10/17/2022   • Acute left-sided low back pain with left-sided sciatica 03/07/2023   • Urine retention 03/10/2023   • Rib fracture 03/14/2023     Resolved Ambulatory Problems     Diagnosis Date Noted   • Diastolic dysfunction 04/05/2016   • Hypertensive pulmonary vascular disease (HCC) 04/05/2016   • Bioprosthetic aortic valve replacement 05/10/2017   • Breast screening 07/08/2014   • Abdominal pain, right lower quadrant 12/13/2020   • Allergic rhinitis 07/10/2014   • Angina pectoris 07/10/2014   • Pancreatitis 01/26/2022   • Colitis due to Clostridioides difficile 01/26/2022   • Hypothyroidism 01/26/2022   • Long term (current) use of opiate analgesic 01/26/2022   • Uncontrolled type 2 diabetes mellitus 07/08/2014   • Benign essential hypertension 07/08/2014   • Hypertension 01/26/2022   • Pulmonary hypertension 04/05/2016   • Hypercholesterolemia  01/26/2022   • Hyperlipidemia 07/08/2014   • Legal blindness 05/25/2018   • Mitral valve disorder 07/10/2014   • Mitral valve regurgitation 04/05/2016   • Ventricular premature beats 04/05/2016   • History of aortic valve replacement 05/10/2017   • Nonrheumatic tricuspid valve regurgitation    • Elevated serum creatinine    • CHF (congestive heart failure) 03/02/2022   • Hypertensive disorder 02/22/2022   • Pancreatitis 02/22/2022   • Sepsis 06/05/2022   • COVID-19 07/30/2022   • Weakness generalized 07/30/2022   • Leukopenia 08/02/2022   • Cytokine release syndrome, grade 1 08/02/2022   • Urine retention 08/02/2022   • COVID-19 virus infection 08/06/2022   • Acute DVT (deep venous thrombosis) 08/07/2022   • Lumbar compression fracture, closed, initial encounter 10/17/2022   • Acute diastolic congestive heart failure 12/20/2022   • Esophageal dysphagia 12/24/2022     Past Medical History:   Diagnosis Date   • Back pain    • CKD (chronic kidney disease)    • Diverticulosis    • Exertional shortness of breath    • Heart disease    • Hyperthyroidism    • L5 compression fracture (HCC) 08/2022   • Left ventricular hypertrophy    • Liver disease    • Low back pain    • Mitral regurgitation    • Osteoarthritis of hip    • Osteoporosis    • Peripheral neuropathy    • Premature ventricular contractions    • Renal insufficiency syndrome    • Type 2 diabetes mellitus    • Uterine cancer          PAST SURGICAL HISTORY  Past Surgical History:   Procedure Laterality Date   • AORTIC VALVE REPAIR/REPLACEMENT     • CATARACT EXTRACTION      1970, 1999   • CHOLECYSTECTOMY     • ENDOSCOPY  08/15/2014    no gross lesions in stomach/duodenum, erythrematous mucosa in stomach   • EPIDURAL BLOCK     • HYSTERECTOMY  2007   • STERNOTOMY           FAMILY HISTORY  Family History   Problem Relation Age of Onset   • Heart disease Mother    • Hypertension Mother    • Stroke Mother    • Diabetes Mother         mellitus   • Other Other          cardiovascular disorder         SOCIAL HISTORY  Social History     Socioeconomic History   • Marital status:    Tobacco Use   • Smoking status: Former     Packs/day: 0.50     Types: Cigarettes     Quit date: 7/10/1969     Years since quittin.7   • Smokeless tobacco: Never   Vaping Use   • Vaping Use: Never used   Substance and Sexual Activity   • Alcohol use: No     Comment: caffeine use - coffee 2 cups daily   • Drug use: No   • Sexual activity: Defer         ALLERGIES  Baclofen, Erythromycin, Statins, Cephalexin, Penicillins, and Sulfa antibiotics        REVIEW OF SYSTEMS  Review of Systems     All systems reviewed and negative except for those discussed in HPI.       PHYSICAL EXAM    I have reviewed the triage vital signs and nursing notes.    ED Triage Vitals   Temp Heart Rate Resp BP SpO2   23 1112 23 1112 23 1112 23 1112 23 1112   97.4 °F (36.3 °C) 58 16 123/60 100 %      Temp src Heart Rate Source Patient Position BP Location FiO2 (%)   23 1112 23 1124 23 1124 23 1124 --   Tympanic Monitor Lying Left arm        Physical Exam  General: No acute distress, nontoxic, nondiaphoretic  HEENT: Mucous membranes moist, atraumatic, EOMI  Neck: Full ROM  Pulm: Symmetric chest rise, nonlabored, lungs slightly diminished in the bases bilaterally but no overt wheezes/rales/rhonchi  Cardiovascular: Regular rate and rhythm, intact distal pulses, 1+ nonpitting edema bilateral lower extremities   GI: Soft, nontender, nondistended, no rebound, no guarding, bowel sounds present  MSK: Full ROM, no deformity  Skin: Warm, dry  Neuro: Awake, alert, oriented x 4, GCS 15, moving all extremities, no focal deficits  Psych: Calm, cooperative      N95, protective eye goggles, and gloves used during this encounter. Patient in surgical mask.      LAB RESULTS  Recent Results (from the past 24 hour(s))   Comprehensive Metabolic Panel    Collection Time: 23 11:25 AM     Specimen: Blood   Result Value Ref Range    Glucose 113 (H) 65 - 99 mg/dL    BUN 24 (H) 8 - 23 mg/dL    Creatinine 1.21 (H) 0.57 - 1.00 mg/dL    Sodium 133 (L) 136 - 145 mmol/L    Potassium 4.6 3.5 - 5.2 mmol/L    Chloride 101 98 - 107 mmol/L    CO2 23.7 22.0 - 29.0 mmol/L    Calcium 8.4 8.2 - 9.6 mg/dL    Total Protein 6.7 6.0 - 8.5 g/dL    Albumin 3.3 (L) 3.5 - 5.2 g/dL    ALT (SGPT) 36 (H) 1 - 33 U/L    AST (SGOT) 22 1 - 32 U/L    Alkaline Phosphatase 69 39 - 117 U/L    Total Bilirubin 0.4 0.0 - 1.2 mg/dL    Globulin 3.4 gm/dL    A/G Ratio 1.0 g/dL    BUN/Creatinine Ratio 19.8 7.0 - 25.0    Anion Gap 8.3 5.0 - 15.0 mmol/L    eGFR 42.1 (L) >60.0 mL/min/1.73   BNP    Collection Time: 04/04/23 11:25 AM    Specimen: Blood   Result Value Ref Range    proBNP 7,718.0 (H) 0.0 - 1,800.0 pg/mL   Single High Sensitivity Troponin T    Collection Time: 04/04/23 11:25 AM    Specimen: Blood   Result Value Ref Range    HS Troponin T 76 (C) <10 ng/L   Green Top (Gel)    Collection Time: 04/04/23 11:25 AM   Result Value Ref Range    Extra Tube Hold for add-ons.    Lavender Top    Collection Time: 04/04/23 11:25 AM   Result Value Ref Range    Extra Tube hold for add-on    Gold Top - SST    Collection Time: 04/04/23 11:25 AM   Result Value Ref Range    Extra Tube Hold for add-ons.    Light Blue Top    Collection Time: 04/04/23 11:25 AM   Result Value Ref Range    Extra Tube Hold for add-ons.    CBC Auto Differential    Collection Time: 04/04/23 11:25 AM    Specimen: Blood   Result Value Ref Range    WBC 8.20 3.40 - 10.80 10*3/mm3    RBC 3.42 (L) 3.77 - 5.28 10*6/mm3    Hemoglobin 9.9 (L) 12.0 - 15.9 g/dL    Hematocrit 32.5 (L) 34.0 - 46.6 %    MCV 95.0 79.0 - 97.0 fL    MCH 28.9 26.6 - 33.0 pg    MCHC 30.5 (L) 31.5 - 35.7 g/dL    RDW 18.7 (H) 12.3 - 15.4 %    RDW-SD 66.0 (H) 37.0 - 54.0 fl    MPV 10.3 6.0 - 12.0 fL    Platelets 144 140 - 450 10*3/mm3    Neutrophil % 82.4 (H) 42.7 - 76.0 %    Lymphocyte % 10.2 (L) 19.6 - 45.3 %     Monocyte % 6.2 5.0 - 12.0 %    Eosinophil % 0.6 0.3 - 6.2 %    Basophil % 0.2 0.0 - 1.5 %    Neutrophils, Absolute 6.75 1.70 - 7.00 10*3/mm3    Lymphocytes, Absolute 0.84 0.70 - 3.10 10*3/mm3    Monocytes, Absolute 0.51 0.10 - 0.90 10*3/mm3    Eosinophils, Absolute 0.05 0.00 - 0.40 10*3/mm3    Basophils, Absolute 0.02 0.00 - 0.20 10*3/mm3   Magnesium    Collection Time: 04/04/23 11:25 AM    Specimen: Blood   Result Value Ref Range    Magnesium 2.0 1.7 - 2.3 mg/dL   Procalcitonin    Collection Time: 04/04/23 11:25 AM    Specimen: Blood   Result Value Ref Range    Procalcitonin 0.10 0.00 - 0.25 ng/mL   Lactic Acid, Plasma    Collection Time: 04/04/23 11:26 AM    Specimen: Blood   Result Value Ref Range    Lactate 1.5 0.5 - 2.0 mmol/L   ECG 12 Lead ED Triage Standing Order; SOA    Collection Time: 04/04/23 11:53 AM   Result Value Ref Range    QT Interval 446 ms       Ordered the above labs and independently interpreted results. My findings will be discussed in the medical decision making section below        RADIOLOGY  XR Chest 1 View    Result Date: 4/4/2023  XR CHEST 1 VW-  HISTORY: Female who is 89 years-old,  short of breath  TECHNIQUE: Frontal view of the chest  COMPARISON: 03/07/2023  FINDINGS: The heart size is borderline. Aorta is tortuous, calcified. Sternotomy wires are present. Pulmonary vasculature is mildly congested. Mild bilateral basilar atelectasis/infiltrate/effusion. No pneumothorax. No acute osseous process.      Mild bilateral basilar atelectasis/infiltrate/effusion, follow-up recommended. Borderline heart size with mild pulmonary vascular congestion. Tortuous aorta.  This report was finalized on 4/4/2023 12:11 PM by Dr. Donald Hernandez M.D.        Ordered the above noted radiological studies.  Independently interpreted by me and my independent review of findings can be found in the ED Course.  See dictation for official radiology interpretation.      PROCEDURES    Procedures  EKG - Per my  independent interpretation:    EKG Time: 1153  Rhythm/Rate: Sinus rhythm with rate of 57  Normal axis  Normal intervals  Nonspecific repolarization abnormalities   No STEMI       No emergent changes as compared to March 7, 2023      MEDICATIONS GIVEN IN ER    Medications   sodium chloride 0.9 % flush 10 mL (has no administration in time range)   furosemide (LASIX) injection 40 mg (has no administration in time range)   carvedilol (COREG) tablet 12.5 mg (has no administration in time range)   furosemide (LASIX) injection 80 mg (80 mg Intravenous Given 4/4/23 1417)         PROGRESS, DATA ANALYSIS, CONSULTS, AND MEDICAL DECISION MAKING    Please note that this section constitutes my independent interpretation of clinical data including lab results, radiology, EKG's.  This constitutes my independent professional opinion regarding differential diagnosis and management of this patient.  It may include any factors such as history from outside sources, review of external records, social determinants of health, management of medications, response to those treatments, and discussions with other providers.    Differential Diagnosis and Plan: Initial concern for CHF exacerbation, viral process, pneumonia, renal failure, electrolyte abnormalities, arrhythmia, anemia, among others.  Plan for labs, chest x-ray, EKG, and reevaluation with results.    Additional sources:  - Discussed/ obtained information from independent historians:   Family bedside states that she had severe breathing difficulty this morning worse than they had seen recently, and are concerned about her ongoing nausea despite dietary changes.  They report that she does have a history of esophageal polyps and has seen Dr. Martel in the past for gastroparesis, was previously on Reglan     - Chronic or social conditions impacting care:      - Shared decision making:  Patient and family at bedside fully updated on and in agreement with the course and plan moving  forward    ED Course as of 04/04/23 1640   Tue Apr 04, 2023   1138 XR Chest 1 View  Per my independent interpretation of the chest x-ray, no evidence of pneumothorax [DC]   1150 WBC: 8.20 [DC]   1150 Hemoglobin(!): 9.9  10.5 three weeks ago [DC]   1150 Platelets: 144 [DC]   1202 Magnesium: 2.0 [DC]   1202 Lactate: 1.5 [DC]   1202 Glucose(!): 113 [DC]   1202 BUN(!): 24 [DC]   1202 Creatinine(!): 1.21  1.77 three weeks ago [DC]   1202 Sodium(!): 133 [DC]   1202 Potassium: 4.6 [DC]   1202 ALT (SGPT)(!): 36 [DC]   1202 AST (SGOT): 22 [DC]   1202 Alkaline Phosphatase: 69 [DC]   1202 Total Bilirubin: 0.4 [DC]   1214 proBNP(!): 7,718.0  2394 four weeks ago [DC]   1214 HS Troponin T(!!): 76  67 four weeks ago, 77 prior [DC]   1214 Procalcitonin: 0.10 [DC]   1214 Magnesium: 2.0 [DC]   1216 XR Chest 1 View  Radiology report reviewed, mild bilateral bibasilar atelectasis versus infiltrate versus effusion [DC]   1313 Discussed with Dr. Butcher, cardiology, discussed patient's clinical course and findings today, treatment modalities, and they will consult [DC]   1329 Discussed with Dr. Lynch, [facility], discussed patient's clinical course and findings today, treatment modalities, cardiology consult and recommendations, and need for hospital stay [DC]      ED Course User Index  [DC] Carloz Tee MD       Hospitalization Considered?: yes    Orders Placed During This Visit:  Orders Placed This Encounter   Procedures   • XR Chest 1 View   • [place] Draw   • Comprehensive Metabolic Panel   • BNP   • Single High Sensitivity Troponin T   • CBC Auto Differential   • Magnesium   • Lactic Acid, Plasma   • Procalcitonin   • Basic Metabolic Panel   • Magnesium   • NPO Diet NPO Type: Strict NPO   • Undress & Gown   • Cardiac Monitoring   • Continuous Pulse Oximetry   • Vital Signs   • Daily Weights   • Code Status and Medical Interventions:   • REBECCA (on-call MD unless specified)   • BERTRAMA (on-call MD unless specified) Details   • Oxygen Therapy-  Nasal Cannula; 2 LPM; Titrate for SPO2: equal to or greater than, 92%   • ECG 12 Lead ED Triage Standing Order; SOA   • ECG 12 Lead Dyspnea   • SCANNED - TELEMETRY     • Adult Transthoracic Echo Complete W/ Cont if Necessary Per Protocol   • Insert Peripheral IV   • Initiate Observation Status   • CBC & Differential   • Green Top (Gel)   • Lavender Top   • Gold Top - SST   • Light Blue Top       Additional orders considered but not placed:      Independent interpretation of labs, radiology studies, and discussions with consultants: See ED Course        AS OF 16:40 EDT VITALS:    BP - 151/59  HR - 64  TEMP - 97.6 °F (36.4 °C) (Oral)  02 SATS - 98%        DIAGNOSIS  Final diagnoses:   Acute on chronic combined systolic and diastolic congestive heart failure   History of coronary artery disease   Chronic renal impairment, unspecified CKD stage   Dyspnea, unspecified type   Chronic anemia   Elevated troponin   Elevated brain natriuretic peptide (BNP) level         DISPOSITION  HOSPITALIZATION    Discussed treatment plan and reason for hospitalization with pt/family and hospitalizing physician.  Pt/family voiced understanding of the plan for hospitalization for further testing/treatment as needed.                   --    Please note that portions of this were completed with a voice recognition program.       Note Disclaimer: At [facility], we believe that sharing information builds trust and better relationships. You are receiving this note because you are receiving care at [facility] or recently visited. It is possible you will see health information before a provider has talked with you about it. This kind of information can be easy to misunderstand. To help you fully understand what it means for your health, we urge you to discuss this note with your provider.         Carloz Tee MD  04/04/23 1640

## 2023-04-04 NOTE — ED NOTES
"Pt states today she has had increased SOB. Pt states she normally is SOB due to her CHF. Pt states she has had a  cough for the past two weeks and has been taking cough syrup that has helped some. Pt states she coughed up clear mucus yesterday. Pt states she has also had nausea for the past two weeks. Pt states she has been  taking Zofran for the nausea and it has helped some. Pt states she has a \"burning sensation in her mouth and throat.\" Pt states she has had increased swelling in her left leg.   "

## 2023-04-04 NOTE — PLAN OF CARE
Goal Outcome Evaluation:  Plan of Care Reviewed With: patient        Progress: no change  Outcome Evaluation: Pt AOx4. On RA. NSR-SB on monitor. F/c in place UO per chart--good response to lasix. Got 80mg lasix in ER. Pt is legally blind --needs help with meals. Lives at home w family. WOCCN consulted per wounds on bottom. Pain meds per MAR. Pt takes ativan PO at night or she will not sleep and have bad anxiety. Pt not in any distress. Nursing will continue to monitor     Pt has about 4-5 blister like area on her lumbar spine area/R hip side (covered with mepilex) as well as coxccyx presure injury that is covered with mepilex-- dressing changed and pics in epic.

## 2023-04-04 NOTE — H&P
Internal medicine history and physical  INTERNAL MEDICINE   [facility]       Patient Identification:  Name: Helena Carmen  Age: 89 y.o.  Sex: female  :  1931  MRN: [id number]                   Primary Care Physician: Mariah Hickman MD                               Date of admission:2023    Chief Complaint: Worsening shortness of breath since 530 this morning in the background of progressive shortness of breath over the course of last 3 weeks.    History of Present Illness:   Patient is a 89-year-old female who has complicated past medical history including L5 compression fx, hypothyroidism, HTN, macular degeneration/legally blind, DM, paroxysmal afib, chronic TCP presented on 3/7/2023 with increasing back pain and decreased mobility.  She was found to have significant thrombocytopenia for which she required IVIG to improve her platelet count greater than 100k so that she had epidural injection performed with improvement.  Patient was evaluated by neurosurgery urology and hematology service for back pain and urinary retention and thrombocytopenia.  Patient was in the hospital for 7 days and was discharged on 3/14/2023.  According to the patient right after she was released from the hospital she started having increasing shortness of breath.  She had issues with urinary retention and hyponatremia and was discharged with serum sodium level of 131 and indwelling catheter.  Patient is significant osteoporosis and bursitis L5 compression fracture she was found to have rib fractures in different stages of healing.  Lidoderm patch was prescribed.  Patient has been having episodes of shortness of breath with increasing swelling of her legs over the course of last 2 to 3 weeks and this morning her symptoms got worse and she was significantly out of breath.  [facility] was called and patient was noted to have oxygen saturation of 88%.  Nasal cannula oxygen supplementation was provided and  patient was brought to the emergency room and was found to have anemia of 9.9 troponin of 76 sodium of 133 creatinine of 1.21 and BNP of 7718.  Chest x-ray shows vascular congestion and Pro-Trevor of 0.1.  Patient received IV Lasix with diuresis and improvement in her symptoms with being admitted for exacerbation congestive heart failure.  Her last echocardiogram was performed in July 2022 and at that time she had left ventricular ejection Phen recorded a 61 to 65% with grade 2 diastolic dysfunction.    Past Medical History:  Past Medical History:   Diagnosis Date   • Aortic valve stenosis     s/p tissue AVR   • Back pain    • CKD (chronic kidney disease)    • Colitis due to Clostridioides difficile 01/26/2022   • Diastolic dysfunction     Grade 2 per echocardiogram 2013   • Diverticulosis    • Exertional shortness of breath    • Heart disease    • Hiatal hernia    • Hyperlipidemia    • Hypertension    • Hyperthyroidism    • Hypertriglyceridemia 05/31/2018   • Hypothyroidism    • L5 compression fracture (HCC) 08/2022   • Left ventricular hypertrophy    • Legally blind    • Liver disease    • Low back pain    • Macular degeneration    • Mitral regurgitation    • Osteoarthritis of hip    • Osteoporosis    • Pancreatitis 01/26/2022   • Paroxysmal atrial fibrillation    • Peripheral neuropathy    • Premature ventricular contractions    • Pulmonary hypertension    • Renal insufficiency syndrome    • Type 2 diabetes mellitus    • Uterine cancer      Past Surgical History:  Past Surgical History:   Procedure Laterality Date   • AORTIC VALVE REPAIR/REPLACEMENT     • CATARACT EXTRACTION      1970, 1999   • CHOLECYSTECTOMY     • ENDOSCOPY  08/15/2014    no gross lesions in stomach/duodenum, erythrematous mucosa in stomach   • EPIDURAL BLOCK     • HYSTERECTOMY  2007   • STERNOTOMY        Home Meds:  (Not in a hospital admission)    Current Meds:     Current Facility-Administered Medications:   •  carvedilol (COREG) tablet 12.5  mg, 12.5 mg, Oral, BID With Meals, Teresa Patton APRN  •  [START ON 4/5/2023] furosemide (LASIX) injection 40 mg, 40 mg, Intravenous, Q12H, Teresa Patton APRN  •  sodium chloride 0.9 % flush 10 mL, 10 mL, Intravenous, PRN, Emergency, Triage Protocol, MD    Current Outpatient Medications:   •  acetaminophen (TYLENOL) 325 MG tablet, Take 2 tablets by mouth Every 4 (Four) Hours As Needed for Mild Pain., Disp: , Rfl:   •  amLODIPine (NORVASC) 2.5 MG tablet, Take 1 tablet by mouth Daily., Disp: 30 tablet, Rfl: 11  •  carvedilol (COREG) 12.5 MG tablet, Take 1 tablet by mouth 2 (Two) Times a Day., Disp: 60 tablet, Rfl: 11  •  cetirizine (zyrTEC) 5 MG tablet, Take 1 tablet by mouth Daily As Needed for Allergies., Disp: , Rfl:   •  cholecalciferol (VITAMIN D3) 25 MCG (1000 UT) tablet, Take 1 tablet by mouth Daily., Disp: , Rfl:   •  fluticasone-salmeterol (Advair HFA) 115-21 MCG/ACT inhaler, Inhale 2 puffs 2 (Two) Times a Day., Disp: 12 each, Rfl: 11  •  gabapentin (NEURONTIN) 600 MG tablet, Take 1 tablet by mouth 2 (Two) Times a Day., Disp: 6 tablet, Rfl: 0  •  guaiFENesin (MUCINEX) 600 MG 12 hr tablet, Take 2 tablets by mouth Every 12 (Twelve) Hours., Disp: , Rfl:   •  hydrALAZINE (APRESOLINE) 25 MG tablet, Take 2 tablets by mouth 2 (Two) Times a Day., Disp: , Rfl:   •  HYDROcodone-acetaminophen (NORCO) 7.5-325 MG per tablet, Take 1 tablet by mouth Every 6 (Six) Hours., Disp: 12 tablet, Rfl: 0  •  insulin glargine (LANTUS, SEMGLEE) 100 UNIT/ML injection, Inject 25 Units under the skin into the appropriate area as directed Every Night., Disp: , Rfl: 12  •  insulin lispro (ADMELOG) 100 UNIT/ML injection, Inject 0-9 Units under the skin into the appropriate area as directed 3 (Three) Times a Day Before Meals., Disp: , Rfl: 12  •  levothyroxine (SYNTHROID, LEVOTHROID) 50 MCG tablet, Take 1 tablet by mouth Every Morning., Disp: , Rfl:   •  lidocaine (LIDODERM) 5 %, Place 1 patch on the skin as directed by provider  Daily. Remove & Discard patch within 12 hours or as directed by MD, Disp: 6 each, Rfl: 0  •  Loratadine (CLARITIN PO), Take  by mouth., Disp: , Rfl:   •  LORazepam (ATIVAN) 0.5 MG tablet, Take 1 tablet by mouth Every 8 (Eight) Hours As Needed for Anxiety., Disp: 9 tablet, Rfl: 0  •  montelukast (SINGULAIR) 10 MG tablet, Take 1 tablet by mouth Every Night., Disp: , Rfl:   •  ondansetron (ZOFRAN) 8 MG tablet, Take 1 tablet by mouth As Needed., Disp: , Rfl:   •  predniSONE (DELTASONE) 5 MG tablet, Take 2 tablets by mouth Daily With Breakfast., Disp: 60 tablet, Rfl: 1  •  tamsulosin (FLOMAX) 0.4 MG capsule 24 hr capsule, Take 1 capsule by mouth every night at bedtime., Disp: , Rfl:   •  docusate sodium 100 MG capsule, Take 1 capsule by mouth 2 (Two) Times a Day., Disp: , Rfl:   •  methocarbamol (ROBAXIN) 750 MG tablet, Take 1 tablet by mouth Every 8 (Eight) Hours As Needed for Muscle Spasms., Disp: , Rfl:   •  polyethylene glycol (MIRALAX) 17 GM/SCOOP powder, Take 17 g by mouth Daily., Disp: , Rfl:   •  sennosides-docusate (PERICOLACE) 8.6-50 MG per tablet, Take 2 tablets by mouth Every Night., Disp: , Rfl:   Allergies:  Allergies   Allergen Reactions   • Erythromycin Unknown (See Comments) and Other (See Comments)     Pt states she does not remember but it was many years ago  Pt states she does not remember but it was many years ago   • Statins Myalgia   • Cephalexin Other (See Comments) and Unknown (See Comments)     2022 Pt has tolerated ceftriaxone and cefepime during admission.   Pt states she does not remember reaction but it was many years ago   • Penicillins Rash   • Sulfa Antibiotics Itching and Rash     Social History:   Social History     Tobacco Use   • Smoking status: Former     Packs/day: 0.50     Types: Cigarettes     Quit date: 7/10/1969     Years since quittin.7   • Smokeless tobacco: Never   Substance Use Topics   • Alcohol use: No     Comment: caffeine use - coffee 2 cups daily      Family  "History:  Family History   Problem Relation Age of Onset   • Heart disease Mother    • Hypertension Mother    • Stroke Mother    • Diabetes Mother         mellitus   • Other Other         cardiovascular disorder          Review of Systems  See history of present illness and past medical history.   As described in history presenting illness.      Vitals:   /79 (BP Location: Left arm, Patient Position: Lying)   Pulse 55   Temp 97.4 °F (36.3 °C) (Tympanic)   Resp 16   Ht 144.8 cm (57\")   Wt 76.2 kg (168 lb)   SpO2 98%   BMI 36.35 kg/m²   I/O:     Intake/Output Summary (Last 24 hours) at 4/4/2023 1608  Last data filed at 4/4/2023 1600  Gross per 24 hour   Intake --   Output 1600 ml   Net -1600 ml     Exam:  Patient is examined using the personal protective equipment as per guidelines from infection control for this particular patient as enacted.  Hand washing was performed before and after patient interaction.  General Appearance:   Awake pleasant elderly female who is legally blind lying comfortably in the bed and claims to be feeling and breathing somewhat better.   Head:    Normocephalic, without obvious abnormality, atraumatic   Eyes:   Legally blind   Ears:    Normal external ear canals, both ears   Nose:   Nares normal, septum midline, mucosa normal, no drainage    or sinus tenderness   Throat:   Lips, tongue, gums normal; oral mucosa pink and moist   Neck:   Supple, symmetrical, trachea midline, no adenopathy;     thyroid:  no enlargement/tenderness/nodules; no carotid    bruit or JVD   Back:     Symmetric, no curvature, ROM normal, no CVA tenderness   Lungs:    Bibasilar crackles no obvious eliciting muscles of breathing noted   Chest Wall:    No tenderness or deformity    Heart:   S1-S2 regular   Abdomen:    Obese soft nontender   Extremities:  Bilateral lower extremity edema   Pulses:   Pulses palpable in all extremities; symmetric all extremities   Skin:  Body wall edema noted   Neurologic:  " Legally blind otherwise grossly nonfocal examination.       Data Review:      I reviewed the patient's new clinical results.  Results from last 7 days   Lab Units 04/04/23  1125   WBC 10*3/mm3 8.20   HEMOGLOBIN g/dL 9.9*   PLATELETS 10*3/mm3 144     Results from last 7 days   Lab Units 04/04/23  1125   SODIUM mmol/L 133*   POTASSIUM mmol/L 4.6   CHLORIDE mmol/L 101   CO2 mmol/L 23.7   BUN mg/dL 24*   CREATININE mg/dL 1.21*   CALCIUM mg/dL 8.4   GLUCOSE mg/dL 113*     CT Chest Without Contrast Diagnostic    Result Date: 3/13/2023  1. There is a buckled contour of some of the right anterior lateral right ribs which was not present on the previous CT 08/07/2022, but are otherwise age-indeterminate. Some of these may possibly represent acute buckle fractures, but healing or old fractures are favored. 2. Minimal bilateral pleural effusions  This report was finalized on 3/13/2023 7:31 AM by Dr. Terri Stoner M.D.      MRI Lumbar Spine Without Contrast    Result Date: 3/8/2023  1. On the prior MRI of the lumbar spine on 10/17/2022 there was an acute compression fracture involving the superior body of L5 with a fluid cleft tracking anteroposteriorly through the superior body of the L5 vertebra and there was an acute fracture involving the right inferior body of L5 with a fluid cleft in the right inferior body of L5 vertebra. The compression fracture has slightly worsened and there is a persistent fluid cleft in the right inferior body of L5 and a horizontal fluid cleft in the body of L5 and now there is 25% loss of the anterior posterior vertebral body height and there is persistent marrow edema throughout the compressed L5 vertebra. Furthermore, the fracture plane extends through the left posterior body of L5 just anterior territory of the left pedicle inserts on the left posterior body of L5. There is been further loss of height of the neural foramen at L5-S1. There is spurring off the facets and the posterior foramina and  there is bulging disc material into the inferior foramina and there is at least moderate left and there is moderate-to-severe right foraminal narrowing at L5-S1 that could affect the exiting L5 nerve roots. The fracture could be more comprehensively assessed with a CT of the lumbar spine if clinically indicated. 2. At L3-4 there is moderate bilateral facet overgrowth and a 5 mm anterolisthesis of L3 on L4 and there is mild canal and bilateral foraminal narrowing. At L2-3 there is mild posterior spurring but no canal narrowing and there is eccentric spurring into the right foramen and to the far right laterally mildly narrows the right foramen and the far right lateral spurs abut the ventral aspect of the right L2 nerve root lateral to the right foramen 3. There is prominent distention of the urinary bladder with urine. Correlate this clinically.  This report was finalized on 3/8/2023 8:50 AM by Dr. Ovidio Sorto M.D.      XR Chest 1 View    Result Date: 4/4/2023  Mild bilateral basilar atelectasis/infiltrate/effusion, follow-up recommended. Borderline heart size with mild pulmonary vascular congestion. Tortuous aorta.  This report was finalized on 4/4/2023 12:11 PM by Dr. Donald Hernandez M.D.      CT Abdomen Pelvis Stone Protocol    Result Date: 3/9/2023  1.  No acute intra-abdominal or pelvic process. 2.  Distended urinary bladder extending to just below the umbilicus. 3.  Increasing bibasilar airspace disease favoring atelectasis with trace pleural fluid. Recommend follow-up to resolution. 4.  L5 compression fractures similar to the MRI of 03/07/2023. 5.  Please see above for additional chronic findings/recommendations.    This report was finalized on 3/9/2023 4:05 PM by Dr. Gisell Gonzalez M.D.      Microbiology Results (last 10 days)     ** No results found for the last 240 hours. **        ECG 12 Lead Dyspnea   Preliminary Result   HEART RATE= 55  bpm   RR Interval= 1091  ms   OR Interval= 150  ms   P Horizontal  Axis= 62  deg   P Front Axis= 30  deg   QRSD Interval= 107  ms   QT Interval= 484  ms   QRS Axis= 46  deg   T Wave Axis= 88  deg   - ABNORMAL ECG -   Sinus rhythm   Multiple premature complexes, vent & supraven   Incomplete left bundle branch block   Electronically Signed By:    Date and Time of Study: 2023-04-05 02:59:51      ECG 12 Lead ED Triage Standing Order; SOA   Final Result   HEART RATE= 57  bpm   RR Interval= 1053  ms   [place] Interval= 154  ms   P Horizontal Axis= 37  deg   P Front Axis= 45  deg   QRSD Interval= 104  ms   QT Interval= 446  ms   QRS Axis= 8  deg   T Wave Axis= 106  deg   - ABNORMAL ECG -   Sinus rhythm   LVH with secondary repolarization abnormality   No change from previous tracing   Electronically Signed By: Beth Butcher ([facility]) 04-Apr-2023 16:10:25   Date and Time of Study: 2023-04-04 11:53:27      SCANNED - TELEMETRY     Final Result      SCANNED - TELEMETRY     Final Result        CT Chest Without Contrast Diagnostic    Result Date: 3/13/2023  1. There is a buckled contour of some of the right anterior lateral right ribs which was not present on the previous CT 08/07/2022, but are otherwise age-indeterminate. Some of these may possibly represent acute buckle fractures, but healing or old fractures are favored. 2. Minimal bilateral pleural effusions  This report was finalized on 3/13/2023 7:31 AM by Dr. Terri Stoner M.D.      MRI Lumbar Spine Without Contrast    Result Date: 3/8/2023  1. On the prior MRI of the lumbar spine on 10/17/2022 there was an acute compression fracture involving the superior body of L5 with a fluid cleft tracking anteroposteriorly through the superior body of the L5 vertebra and there was an acute fracture involving the right inferior body of L5 with a fluid cleft in the right inferior body of L5 vertebra. The compression fracture has slightly worsened and there is a persistent fluid cleft in the right inferior body of L5 and a horizontal fluid cleft in the body  of L5 and now there is 25% loss of the anterior posterior vertebral body height and there is persistent marrow edema throughout the compressed L5 vertebra. Furthermore, the fracture plane extends through the left posterior body of L5 just anterior territory of the left pedicle inserts on the left posterior body of L5. There is been further loss of height of the neural foramen at L5-S1. There is spurring off the facets and the posterior foramina and there is bulging disc material into the inferior foramina and there is at least moderate left and there is moderate-to-severe right foraminal narrowing at L5-S1 that could affect the exiting L5 nerve roots. The fracture could be more comprehensively assessed with a CT of the lumbar spine if clinically indicated. 2. At L3-4 there is moderate bilateral facet overgrowth and a 5 mm anterolisthesis of L3 on L4 and there is mild canal and bilateral foraminal narrowing. At L2-3 there is mild posterior spurring but no canal narrowing and there is eccentric spurring into the right foramen and to the far right laterally mildly narrows the right foramen and the far right lateral spurs abut the ventral aspect of the right L2 nerve root lateral to the right foramen 3. There is prominent distention of the urinary bladder with urine. Correlate this clinically.  This report was finalized on 3/8/2023 8:50 AM by Dr. Ovidio Sorto M.D.      XR Chest 1 View    Result Date: 4/4/2023  Mild bilateral basilar atelectasis/infiltrate/effusion, follow-up recommended. Borderline heart size with mild pulmonary vascular congestion. Tortuous aorta.  This report was finalized on 4/4/2023 12:11 PM by Dr. Donald Hernandez M.D.      CT Abdomen Pelvis Stone Protocol    Result Date: 3/9/2023  1.  No acute intra-abdominal or pelvic process. 2.  Distended urinary bladder extending to just below the umbilicus. 3.  Increasing bibasilar airspace disease favoring atelectasis with trace pleural fluid. Recommend  follow-up to resolution. 4.  L5 compression fractures similar to the MRI of 03/07/2023. 5.  Please see above for additional chronic findings/recommendations.    This report was finalized on 3/9/2023 4:05 PM by Dr. Gisell Gonzalez M.D.      Brief Urine Lab Results  (Last result in the past 365 days)      Color   Clarity   Blood   Leuk Est   Nitrite   Protein   CREAT   Urine HCG        03/07/23 1138 Yellow   Clear   Negative   Negative   Negative   Negative                   Assessment:  Active Hospital Problems    Diagnosis  POA   • **Acute on chronic combined systolic and diastolic congestive heart failure [I50.43]  Yes   • Chronic midline low back pain with bilateral sciatica [M54.41, M54.42, G89.29]  Yes   • Anemia, chronic disease [D63.8]  Yes   • Other cirrhosis of liver [K74.69]  Yes   • Chronic heart failure with preserved ejection fraction [I50.32]  Yes   • Hypothyroidism [E03.9]  Yes   • Chronic pain disorder [G89.4]  Yes   • Malignant neoplasm of uterus [C55]  Yes   • Macular degeneration [H35.30]  Yes   • Type 2 diabetes mellitus with hyperglycemia [E11.65]  Yes   • Pulmonary hypertension [I27.20]  Yes   • Paroxysmal atrial fibrillation [I48.0]  Yes   • Essential hypertension [I10]  Yes   • Stage 4 chronic kidney disease [N18.4]  Yes     Formatting of this note might be different from the original.  Transitioned From:  Renal insufficiency     • Anxiety [F41.9]  Yes       Medical decision making/care plan: See admitting orders  · Acute exacerbation of diastolic congestive heart failure with preserved left ventricular function based on the echo on 7/2022-plan is to continue diuresis control her heart rate and cardiology consultation.  · Chronic back pain with bilateral sciatica status post CAT during previous hospitalization with improvement.  · Osteoporosis with L5 compression fracture multiple rib fracture and various stages of healing-continue current pain management  · Anemia and thrombocytopenia and  cirrhosis of liver-continue her current regimen and monitor.  · Legally blind due to macular degeneration-provided with fall precautions and establish better communication with patient and nurses  · Type 2 diabetes with hyperglycemia-continue with Accu-Cheks and sliding scale coverage and watch for hypoglycemia  · Paroxysmal atrial fibrillation-continue her current regimen and control her rate.  · Stage IV kidney disease-monitor renal function while she is being diuresed.    Dheeraj Lynch MD   4/4/2023  16:08 EDT    Parts of this note may be an electronic transcription/translation of spoken language to printed text using the Dragon dictation system.

## 2023-04-05 ENCOUNTER — APPOINTMENT (OUTPATIENT)
Dept: CARDIOLOGY | Facility: HOSPITAL | Age: 88
DRG: 291 | End: 2023-04-05
Payer: MEDICARE

## 2023-04-05 LAB
ALBUMIN SERPL-MCNC: 3 G/DL (ref 3.5–5.2)
ALBUMIN/GLOB SERPL: 0.9 G/DL
ALP SERPL-CCNC: 63 U/L (ref 39–117)
ALT SERPL W P-5'-P-CCNC: 24 U/L (ref 1–33)
ANION GAP SERPL CALCULATED.3IONS-SCNC: 12.5 MMOL/L (ref 5–15)
AORTIC DIMENSIONLESS INDEX: 0.9 (DI)
AST SERPL-CCNC: 22 U/L (ref 1–32)
BASOPHILS # BLD AUTO: 0.03 10*3/MM3 (ref 0–0.2)
BASOPHILS NFR BLD AUTO: 0.5 % (ref 0–1.5)
BH CV ECHO MEAS - ACS: 1.19 CM
BH CV ECHO MEAS - AO MAX PG: 4.7 MMHG
BH CV ECHO MEAS - AO MEAN PG: 3 MMHG
BH CV ECHO MEAS - AO ROOT DIAM: 2.38 CM
BH CV ECHO MEAS - AO V2 MAX: 108.6 CM/SEC
BH CV ECHO MEAS - AO V2 VTI: 29.8 CM
BH CV ECHO MEAS - AVA(I,D): 2.6 CM2
BH CV ECHO MEAS - EDV(CUBED): 84.2 ML
BH CV ECHO MEAS - EDV(MOD-SP2): 102 ML
BH CV ECHO MEAS - EDV(MOD-SP4): 93 ML
BH CV ECHO MEAS - EF(MOD-BP): 49.5 %
BH CV ECHO MEAS - EF(MOD-SP2): 50 %
BH CV ECHO MEAS - EF(MOD-SP4): 49.5 %
BH CV ECHO MEAS - ESV(CUBED): 37.4 ML
BH CV ECHO MEAS - ESV(MOD-SP2): 51 ML
BH CV ECHO MEAS - ESV(MOD-SP4): 47 ML
BH CV ECHO MEAS - FS: 23.7 %
BH CV ECHO MEAS - IVS/LVPW: 0.94 CM
BH CV ECHO MEAS - IVSD: 0.87 CM
BH CV ECHO MEAS - LAT PEAK E' VEL: 3.6 CM/SEC
BH CV ECHO MEAS - LV MASS(C)D: 125.9 GRAMS
BH CV ECHO MEAS - LV MAX PG: 3.6 MMHG
BH CV ECHO MEAS - LV MEAN PG: 1.87 MMHG
BH CV ECHO MEAS - LV V1 MAX: 94.6 CM/SEC
BH CV ECHO MEAS - LV V1 VTI: 25.4 CM
BH CV ECHO MEAS - LVIDD: 4.4 CM
BH CV ECHO MEAS - LVIDS: 3.3 CM
BH CV ECHO MEAS - LVOT AREA: 3.1 CM2
BH CV ECHO MEAS - LVOT DIAM: 1.99 CM
BH CV ECHO MEAS - LVPWD: 0.92 CM
BH CV ECHO MEAS - MED PEAK E' VEL: 3.8 CM/SEC
BH CV ECHO MEAS - MR MAX PG: 59.8 MMHG
BH CV ECHO MEAS - MR MAX VEL: 386.7 CM/SEC
BH CV ECHO MEAS - MV DEC SLOPE: 1053 CM/SEC2
BH CV ECHO MEAS - MV DEC TIME: 0.21 MSEC
BH CV ECHO MEAS - MV E MAX VEL: 146 CM/SEC
BH CV ECHO MEAS - MV MAX PG: 9.1 MMHG
BH CV ECHO MEAS - MV MEAN PG: 1.21 MMHG
BH CV ECHO MEAS - MV P1/2T: 42.7 MSEC
BH CV ECHO MEAS - MV V2 VTI: 32.9 CM
BH CV ECHO MEAS - MVA(P1/2T): 5.2 CM2
BH CV ECHO MEAS - MVA(VTI): 2.4 CM2
BH CV ECHO MEAS - PA ACC TIME: 0.07 SEC
BH CV ECHO MEAS - PA PR(ACCEL): 48.1 MMHG
BH CV ECHO MEAS - PA V2 MAX: 76 CM/SEC
BH CV ECHO MEAS - PI END-D VEL: 73.3 CM/SEC
BH CV ECHO MEAS - PULM DIAS VEL: 48.7 CM/SEC
BH CV ECHO MEAS - PULM S/D: 0.65
BH CV ECHO MEAS - PULM SYS VEL: 31.7 CM/SEC
BH CV ECHO MEAS - QP/QS: 0.34
BH CV ECHO MEAS - RAP SYSTOLE: 15 MMHG
BH CV ECHO MEAS - RV MAX PG: 0.97 MMHG
BH CV ECHO MEAS - RV V1 MAX: 49.3 CM/SEC
BH CV ECHO MEAS - RV V1 VTI: 12.6 CM
BH CV ECHO MEAS - RVOT DIAM: 1.66 CM
BH CV ECHO MEAS - RVSP: 75 MMHG
BH CV ECHO MEAS - SV(LVOT): 79 ML
BH CV ECHO MEAS - SV(MOD-SP2): 51 ML
BH CV ECHO MEAS - SV(MOD-SP4): 46 ML
BH CV ECHO MEAS - SV(RVOT): 27.2 ML
BH CV ECHO MEAS - TAPSE (>1.6): 1.34 CM
BH CV ECHO MEAS - TR MAX PG: 60.4 MMHG
BH CV ECHO MEAS - TR MAX VEL: 388.6 CM/SEC
BH CV ECHO MEASUREMENTS AVERAGE E/E' RATIO: 39.46
BH CV XLRA - RV BASE: 3.6 CM
BH CV XLRA - RV MID: 2.5 CM
BH CV XLRA - TDI S': 9.8 CM/SEC
BILIRUB SERPL-MCNC: 0.4 MG/DL (ref 0–1.2)
BUN SERPL-MCNC: 31 MG/DL (ref 8–23)
BUN/CREAT SERPL: 20.1 (ref 7–25)
CALCIUM SPEC-SCNC: 8.5 MG/DL (ref 8.2–9.6)
CHLORIDE SERPL-SCNC: 97 MMOL/L (ref 98–107)
CO2 SERPL-SCNC: 23.5 MMOL/L (ref 22–29)
CREAT SERPL-MCNC: 1.54 MG/DL (ref 0.57–1)
DEPRECATED RDW RBC AUTO: 65.8 FL (ref 37–54)
EGFRCR SERPLBLD CKD-EPI 2021: 31.5 ML/MIN/1.73
EOSINOPHIL # BLD AUTO: 0.09 10*3/MM3 (ref 0–0.4)
EOSINOPHIL NFR BLD AUTO: 1.6 % (ref 0.3–6.2)
ERYTHROCYTE [DISTWIDTH] IN BLOOD BY AUTOMATED COUNT: 18.5 % (ref 12.3–15.4)
GEN 5 2HR TROPONIN T REFLEX: 81 NG/L
GLOBULIN UR ELPH-MCNC: 3.2 GM/DL
GLUCOSE BLDC GLUCOMTR-MCNC: 145 MG/DL (ref 70–130)
GLUCOSE BLDC GLUCOMTR-MCNC: 198 MG/DL (ref 70–130)
GLUCOSE BLDC GLUCOMTR-MCNC: 240 MG/DL (ref 70–130)
GLUCOSE SERPL-MCNC: 219 MG/DL (ref 65–99)
HBA1C MFR BLD: 5.3 % (ref 4.8–5.6)
HCT VFR BLD AUTO: 32.6 % (ref 34–46.6)
HGB BLD-MCNC: 9.6 G/DL (ref 12–15.9)
IMM GRANULOCYTES # BLD AUTO: 0.02 10*3/MM3 (ref 0–0.05)
IMM GRANULOCYTES NFR BLD AUTO: 0.4 % (ref 0–0.5)
LEFT ATRIUM VOLUME INDEX: 44.2 ML/M2
LV EF 2D ECHO EST: 50 %
LYMPHOCYTES # BLD AUTO: 1.67 10*3/MM3 (ref 0.7–3.1)
LYMPHOCYTES NFR BLD AUTO: 30.5 % (ref 19.6–45.3)
MAGNESIUM SERPL-MCNC: 1.9 MG/DL (ref 1.7–2.3)
MAXIMAL PREDICTED HEART RATE: 128 BPM
MCH RBC QN AUTO: 28.2 PG (ref 26.6–33)
MCHC RBC AUTO-ENTMCNC: 29.4 G/DL (ref 31.5–35.7)
MCV RBC AUTO: 95.6 FL (ref 79–97)
MONOCYTES # BLD AUTO: 0.55 10*3/MM3 (ref 0.1–0.9)
MONOCYTES NFR BLD AUTO: 10.1 % (ref 5–12)
NEUTROPHILS NFR BLD AUTO: 3.11 10*3/MM3 (ref 1.7–7)
NEUTROPHILS NFR BLD AUTO: 56.9 % (ref 42.7–76)
NRBC BLD AUTO-RTO: 0.2 /100 WBC (ref 0–0.2)
PLATELET # BLD AUTO: 134 10*3/MM3 (ref 140–450)
PMV BLD AUTO: 10.7 FL (ref 6–12)
POTASSIUM SERPL-SCNC: 4.6 MMOL/L (ref 3.5–5.2)
PROCALCITONIN SERPL-MCNC: 0.11 NG/ML (ref 0–0.25)
PROT SERPL-MCNC: 6.2 G/DL (ref 6–8.5)
QT INTERVAL: 484 MS
RBC # BLD AUTO: 3.41 10*6/MM3 (ref 3.77–5.28)
SODIUM SERPL-SCNC: 133 MMOL/L (ref 136–145)
STRESS TARGET HR: 109 BPM
TROPONIN T DELTA: -17 NG/L
TROPONIN T SERPL HS-MCNC: 98 NG/L
WBC NRBC COR # BLD: 5.47 10*3/MM3 (ref 3.4–10.8)

## 2023-04-05 PROCEDURE — G0378 HOSPITAL OBSERVATION PER HR: HCPCS

## 2023-04-05 PROCEDURE — 25510000001 PERFLUTREN (DEFINITY) 8.476 MG IN SODIUM CHLORIDE (PF) 0.9 % 10 ML INJECTION: Performed by: INTERNAL MEDICINE

## 2023-04-05 PROCEDURE — 80053 COMPREHEN METABOLIC PANEL: CPT | Performed by: INTERNAL MEDICINE

## 2023-04-05 PROCEDURE — 93306 TTE W/DOPPLER COMPLETE: CPT

## 2023-04-05 PROCEDURE — 25010000002 ONDANSETRON PER 1 MG: Performed by: INTERNAL MEDICINE

## 2023-04-05 PROCEDURE — 93306 TTE W/DOPPLER COMPLETE: CPT | Performed by: INTERNAL MEDICINE

## 2023-04-05 PROCEDURE — 63710000001 INSULIN LISPRO (HUMAN) PER 5 UNITS: Performed by: INTERNAL MEDICINE

## 2023-04-05 PROCEDURE — 25010000002 FUROSEMIDE PER 20 MG: Performed by: INTERNAL MEDICINE

## 2023-04-05 PROCEDURE — 97530 THERAPEUTIC ACTIVITIES: CPT

## 2023-04-05 PROCEDURE — 85025 COMPLETE CBC W/AUTO DIFF WBC: CPT | Performed by: INTERNAL MEDICINE

## 2023-04-05 PROCEDURE — 83036 HEMOGLOBIN GLYCOSYLATED A1C: CPT | Performed by: INTERNAL MEDICINE

## 2023-04-05 PROCEDURE — 97110 THERAPEUTIC EXERCISES: CPT | Performed by: PHYSICAL THERAPIST

## 2023-04-05 PROCEDURE — 94799 UNLISTED PULMONARY SVC/PX: CPT

## 2023-04-05 PROCEDURE — 99232 SBSQ HOSP IP/OBS MODERATE 35: CPT | Performed by: INTERNAL MEDICINE

## 2023-04-05 PROCEDURE — 94761 N-INVAS EAR/PLS OXIMETRY MLT: CPT

## 2023-04-05 PROCEDURE — 97162 PT EVAL MOD COMPLEX 30 MIN: CPT | Performed by: PHYSICAL THERAPIST

## 2023-04-05 PROCEDURE — 97166 OT EVAL MOD COMPLEX 45 MIN: CPT

## 2023-04-05 PROCEDURE — 36415 COLL VENOUS BLD VENIPUNCTURE: CPT | Performed by: INTERNAL MEDICINE

## 2023-04-05 PROCEDURE — 84484 ASSAY OF TROPONIN QUANT: CPT | Performed by: INTERNAL MEDICINE

## 2023-04-05 PROCEDURE — 25010000002 HEPARIN (PORCINE) PER 1000 UNITS: Performed by: INTERNAL MEDICINE

## 2023-04-05 PROCEDURE — 84145 PROCALCITONIN (PCT): CPT | Performed by: INTERNAL MEDICINE

## 2023-04-05 PROCEDURE — 93005 ELECTROCARDIOGRAM TRACING: CPT | Performed by: NURSE PRACTITIONER

## 2023-04-05 PROCEDURE — 83735 ASSAY OF MAGNESIUM: CPT | Performed by: INTERNAL MEDICINE

## 2023-04-05 PROCEDURE — 82962 GLUCOSE BLOOD TEST: CPT

## 2023-04-05 PROCEDURE — 93010 ELECTROCARDIOGRAM REPORT: CPT | Performed by: INTERNAL MEDICINE

## 2023-04-05 RX ORDER — CALCIUM CARBONATE 200(500)MG
2 TABLET,CHEWABLE ORAL 3 TIMES DAILY PRN
Status: DISCONTINUED | OUTPATIENT
Start: 2023-04-05 | End: 2023-04-07 | Stop reason: HOSPADM

## 2023-04-05 RX ORDER — PANTOPRAZOLE SODIUM 40 MG/1
40 TABLET, DELAYED RELEASE ORAL
Status: DISCONTINUED | OUTPATIENT
Start: 2023-04-06 | End: 2023-04-07 | Stop reason: HOSPADM

## 2023-04-05 RX ORDER — FAMOTIDINE 20 MG/1
40 TABLET, FILM COATED ORAL NIGHTLY
Status: DISCONTINUED | OUTPATIENT
Start: 2023-04-05 | End: 2023-04-07 | Stop reason: HOSPADM

## 2023-04-05 RX ORDER — HYDRALAZINE HYDROCHLORIDE 25 MG/1
25 TABLET, FILM COATED ORAL EVERY 8 HOURS SCHEDULED
Status: DISCONTINUED | OUTPATIENT
Start: 2023-04-05 | End: 2023-04-07 | Stop reason: HOSPADM

## 2023-04-05 RX ADMIN — ONDANSETRON 4 MG: 2 INJECTION INTRAMUSCULAR; INTRAVENOUS at 23:40

## 2023-04-05 RX ADMIN — BUDESONIDE AND FORMOTEROL FUMARATE DIHYDRATE 2 PUFF: 160; 4.5 AEROSOL RESPIRATORY (INHALATION) at 10:26

## 2023-04-05 RX ADMIN — HEPARIN SODIUM 5000 UNITS: 5000 INJECTION INTRAVENOUS; SUBCUTANEOUS at 09:30

## 2023-04-05 RX ADMIN — GABAPENTIN 600 MG: 300 CAPSULE ORAL at 09:26

## 2023-04-05 RX ADMIN — LEVOTHYROXINE SODIUM 50 MCG: 0.05 TABLET ORAL at 06:38

## 2023-04-05 RX ADMIN — HYDRALAZINE HYDROCHLORIDE 25 MG: 25 TABLET, FILM COATED ORAL at 16:28

## 2023-04-05 RX ADMIN — INSULIN LISPRO 4 UNITS: 100 INJECTION, SOLUTION INTRAVENOUS; SUBCUTANEOUS at 12:01

## 2023-04-05 RX ADMIN — Medication 10 ML: at 21:01

## 2023-04-05 RX ADMIN — INSULIN GLARGINE-YFGN 20 UNITS: 100 INJECTION, SOLUTION SUBCUTANEOUS at 21:00

## 2023-04-05 RX ADMIN — FUROSEMIDE 40 MG: 10 INJECTION, SOLUTION INTRAMUSCULAR; INTRAVENOUS at 12:02

## 2023-04-05 RX ADMIN — HEPARIN SODIUM 5000 UNITS: 5000 INJECTION INTRAVENOUS; SUBCUTANEOUS at 21:01

## 2023-04-05 RX ADMIN — HYDROCODONE BITARTRATE AND ACETAMINOPHEN 1 TABLET: 7.5; 325 TABLET ORAL at 23:40

## 2023-04-05 RX ADMIN — MONTELUKAST SODIUM 10 MG: 10 TABLET, FILM COATED ORAL at 21:00

## 2023-04-05 RX ADMIN — BUDESONIDE AND FORMOTEROL FUMARATE DIHYDRATE 2 PUFF: 160; 4.5 AEROSOL RESPIRATORY (INHALATION) at 20:50

## 2023-04-05 RX ADMIN — GABAPENTIN 600 MG: 300 CAPSULE ORAL at 21:00

## 2023-04-05 RX ADMIN — LORAZEPAM 0.5 MG: 0.5 TABLET ORAL at 21:00

## 2023-04-05 RX ADMIN — Medication 10 ML: at 09:28

## 2023-04-05 RX ADMIN — FUROSEMIDE 40 MG: 10 INJECTION, SOLUTION INTRAMUSCULAR; INTRAVENOUS at 23:40

## 2023-04-05 RX ADMIN — HYDRALAZINE HYDROCHLORIDE 25 MG: 25 TABLET, FILM COATED ORAL at 21:00

## 2023-04-05 RX ADMIN — HYDROCODONE BITARTRATE AND ACETAMINOPHEN 1 TABLET: 7.5; 325 TABLET ORAL at 00:24

## 2023-04-05 RX ADMIN — FUROSEMIDE 40 MG: 10 INJECTION, SOLUTION INTRAMUSCULAR; INTRAVENOUS at 00:24

## 2023-04-05 RX ADMIN — HYDROCODONE BITARTRATE AND ACETAMINOPHEN 1 TABLET: 7.5; 325 TABLET ORAL at 12:01

## 2023-04-05 RX ADMIN — HYDROCODONE BITARTRATE AND ACETAMINOPHEN 1 TABLET: 7.5; 325 TABLET ORAL at 06:38

## 2023-04-05 RX ADMIN — FAMOTIDINE 40 MG: 20 TABLET, FILM COATED ORAL at 21:00

## 2023-04-05 RX ADMIN — PERFLUTREN 3 ML: 6.52 INJECTION, SUSPENSION INTRAVENOUS at 08:44

## 2023-04-05 RX ADMIN — CARVEDILOL 12.5 MG: 12.5 TABLET, FILM COATED ORAL at 17:43

## 2023-04-05 RX ADMIN — HYDROCODONE BITARTRATE AND ACETAMINOPHEN 1 TABLET: 7.5; 325 TABLET ORAL at 17:43

## 2023-04-05 NOTE — PROGRESS NOTES
"    DAILY PROGRESS NOTE  [facility]    Patient Identification:  Name: Helena Carmen  Age: 89 y.o.  Sex: female  :  1931  MRN: [id number]         Primary Care Physician: Mariah Hickman MD    Subjective:  Interval History: Feeling and breathing much better.  Denies any nausea vomiting fever chills    Objective: Case discussed at length with family x2 at bedside.  Patient is blind.  Nontoxic pleasant and conversational    Scheduled Meds:budesonide-formoterol, 2 puff, Inhalation, BID - RT  carvedilol, 12.5 mg, Oral, BID With Meals  furosemide, 40 mg, Intravenous, Q12H  gabapentin, 600 mg, Oral, Q12H  heparin (porcine), 5,000 Units, Subcutaneous, Q12H  hydrALAZINE, 25 mg, Oral, Q8H  HYDROcodone-acetaminophen, 1 tablet, Oral, Q6H  insulin glargine, 20 Units, Subcutaneous, Nightly  insulin lispro, 0-9 Units, Subcutaneous, TID AC  levothyroxine, 50 mcg, Oral, Q AM  montelukast, 10 mg, Oral, Nightly  sennosides-docusate, 2 tablet, Oral, Nightly  sodium chloride, 10 mL, Intravenous, Q12H      Continuous Infusions:     Vital signs in last 24 hours:  Temp:  [97.4 °F (36.3 °C)-97.6 °F (36.4 °C)] 97.6 °F (36.4 °C)  Heart Rate:  [51-64] 62  Resp:  [16-18] 18  BP: (102-177)/(55-79) 141/66    Intake/Output:    Intake/Output Summary (Last 24 hours) at 2023 1041  Last data filed at 2023 1016  Gross per 24 hour   Intake 240 ml   Output 5900 ml   Net -5660 ml       Exam:  /66   Pulse 62   Temp 97.6 °F (36.4 °C) (Oral)   Resp 18   Ht 144 cm (56.69\")   Wt 77 kg (169 lb 12.1 oz)   SpO2 98%   BMI 37.13 kg/m²     General Appearance:    Alert, cooperative, fluent speech, AAOx3                          Head:    Normocephalic, without obvious abnormality, atraumatic                           Eyes:    Legally blind                         Throat:   Oral mucosa pink and moist                           Neck:   Supple, symmetrical, trachea midline, no JVD                         Lungs:    Clear to " auscultation bilaterally, respirations unlabored                 Chest Wall:    No tenderness or deformity                          Heart:    Regular rate and rhythm, S1 and S2 normal                  Abdomen:     Soft, nontender, bowel sounds active                  Extremities:   No real pitting edema -trace                        Pulses:   Pulses palpable in lower extremities                               Data Review:  Labs in chart were reviewed.    Assessment:  Active Hospital Problems    Diagnosis  POA   • **Acute on chronic combined systolic and diastolic congestive heart failure [I50.43]  Yes   • Chronic midline low back pain with bilateral sciatica [M54.41, M54.42, G89.29]  Yes   • Anemia, chronic disease [D63.8]  Yes   • Other cirrhosis of liver [K74.69]  Yes   • Chronic heart failure with preserved ejection fraction [I50.32]  Yes   • Hypothyroidism [E03.9]  Yes   • Chronic pain disorder [G89.4]  Yes   • Malignant neoplasm of uterus [C55]  Yes   • Macular degeneration [H35.30]  Yes   • Type 2 diabetes mellitus with hyperglycemia [E11.65]  Yes   • Pulmonary hypertension [I27.20]  Yes   • Paroxysmal atrial fibrillation [I48.0]  Yes   • Essential hypertension [I10]  Yes   • Stage 4 chronic kidney disease [N18.4]  Yes   • Anxiety [F41.9]  Yes      Resolved Hospital Problems   No resolved problems to display.       Plan:    Acute on chronic biventricular CHF compounded by severe pulmonary hypertension further complicated by chronic kidney disease   -Cardiology assisting with diuretics currently on 40 mg IV every 12 -monitor RFP with uric   -Repeat echo pending   -Abnormal telemetry-questionable A-fib    DM2 with ocular involvement/macular degeneration and secondary blindness compounded by CKD/hyperglycemia/peripheral neuropathy on gabapentin   -Currently on basal bolus regimen with Lantus 20u/SSI   -No p.o. on MAR   -A1c 5.3 -over control will lead to increased risk of falls for this 89-year-old.  I personally  would be fine if her A1c was in the 8 range and would avoid over control of sugars    Morbid obesity with a BMI of 37+ complicates all the above    HTN stable -continue Norvasc/hydralazine -dual vasodilators contributory to edema as well     Anemia of chronic disease with improved thrombocytopenia status post IVIG per hematology prior to admission   -9.6/134 respectively    Known L5 VCF/rib fractures    GERD -chronically on Protonix in the a.m.  States still uncontrolled so will trial Pepcid nightly and use Tums/Pepto as needed        Disposition -ultimately anticipate this patient to correct quickly in regards to fluid status that she already seems much better and is laying completely supine with no aspects of dyspnea.   -Await a.m. lab work and reevaluate tomorrow   -Case discussed with family at bedside as well as the multidisciplinary rounds with plans for home tomorrow    Brian Bella MD  4/5/2023  10:41 EDT

## 2023-04-05 NOTE — PROGRESS NOTES
"    Patient Name: Helena Carmen  :1931  89 y.o.      Patient Care Team:  Mariah Hickman MD as PCP - General (Family Medicine)  Beth Butcher MD as Consulting Physician (Cardiology)  Rolando Wick MD as Consulting Physician (Nephrology)  Pablo Sherman Jr., MD as Consulting Physician (Hematology and Oncology)  Mango Arnold MD as Referring Physician (Internal Medicine)    Chief Complaint: SOB    Interval History: less SOB, no CP       Objective   Vital Signs  Temp:  [97.4 °F (36.3 °C)-97.6 °F (36.4 °C)] 97.6 °F (36.4 °C)  Heart Rate:  [51-64] 51  Resp:  [16-18] 18  BP: (102-177)/(55-79) 141/66    Intake/Output Summary (Last 24 hours) at 2023 0802  Last data filed at 2023 0400  Gross per 24 hour   Intake 240 ml   Output 5250 ml   Net -5010 ml     Flowsheet Rows    Flowsheet Row First Filed Value   Admission Height 144.8 cm (57\") Documented at 2023 1203   Admission Weight 76.2 kg (168 lb) Documented at 2023 1203          Physical Exam:   General Appearance:    Alert, cooperative, in no acute distress   Lungs:     Clear to auscultation.  Normal respiratory effort and rate.      Heart:    Regular rhythm and normal rate, normal S1 and S2, no murmurs, gallops or rubs.     Chest Wall:    No abnormalities observed   Abdomen:     Soft, nontender, positive bowel sounds.     Extremities:   no cyanosis, clubbing or edema.  No marked joint deformities.  Adequate musculoskeletal strength.       Results Review:    Results from last 7 days   Lab Units 23  0453   SODIUM mmol/L 133*   POTASSIUM mmol/L 4.6   CHLORIDE mmol/L 97*   CO2 mmol/L 23.5   BUN mg/dL 31*   CREATININE mg/dL 1.54*   GLUCOSE mg/dL 219*   CALCIUM mg/dL 8.5     Results from last 7 days   Lab Units 23  0642 23  0453 23  1125   HSTROP T ng/L 81* 98* 76*     Results from last 7 days   Lab Units 23  0453   WBC 10*3/mm3 5.47   HEMOGLOBIN g/dL 9.6*   HEMATOCRIT % 32.6*   PLATELETS 10*3/mm3 134* "         Results from last 7 days   Lab Units 04/05/23  0453   MAGNESIUM mg/dL 1.9                   Medication Review:   budesonide-formoterol, 2 puff, Inhalation, BID - RT  carvedilol, 12.5 mg, Oral, BID With Meals  furosemide, 40 mg, Intravenous, Q12H  gabapentin, 600 mg, Oral, Q12H  heparin (porcine), 5,000 Units, Subcutaneous, Q12H  HYDROcodone-acetaminophen, 1 tablet, Oral, Q6H  insulin glargine, 20 Units, Subcutaneous, Nightly  insulin lispro, 0-9 Units, Subcutaneous, TID AC  levothyroxine, 50 mcg, Oral, Q AM  montelukast, 10 mg, Oral, Nightly  sennosides-docusate, 2 tablet, Oral, Nightly  sodium chloride, 10 mL, Intravenous, Q12H              Assessment & Plan   Active Hospital Problems    Diagnosis  POA   • **Acute on chronic combined systolic and diastolic congestive heart failure [I50.43]  Yes   • Chronic midline low back pain with bilateral sciatica [M54.41, M54.42, G89.29]  Yes   • Anemia, chronic disease [D63.8]  Yes   • Other cirrhosis of liver [K74.69]  Yes   • Chronic heart failure with preserved ejection fraction [I50.32]  Yes   • Hypothyroidism [E03.9]  Yes   • Chronic pain disorder [G89.4]  Yes   • Malignant neoplasm of uterus [C55]  Yes   • Macular degeneration [H35.30]  Yes   • Type 2 diabetes mellitus with hyperglycemia [E11.65]  Yes   • Pulmonary hypertension [I27.20]  Yes   • Paroxysmal atrial fibrillation [I48.0]  Yes   • Essential hypertension [I10]  Yes   • Stage 4 chronic kidney disease [N18.4]  Yes   • Anxiety [F41.9]  Yes      Resolved Hospital Problems   No resolved problems to display.     Acute on chronic diastolic CHF with preserved EF .   hypertension  hyperlipidemia  CKD  History of DVT  NSVT: continue BB  Severe aortic stenosis s/p [place] trifecta bovine pericardial valve (2013)  Obesity     1. Aortic valve stenosis: June 18, 2013 she had 21 mm [place] trifecta bovine pericardial valve placed.  Echocardiogram January 2022 showed normal functioning bioprosthetic aortic valve  but with mean and peak gradients elevated.  Valve was grossly normal on July 2022 echocardiogram. Echo this AM  2. Mitral valve insufficiency: Severe MAC noted on July 2022 echocardiogram with mild regurgitation but no stenosis.  Repeat echo this AM  3. Acute on Chronic diastolic heart failure: Left ventricular diastolic function noted to be consistent with age on echocardiogram January 2022 echocardiogram showed grade 2 with high LAP LV diastolic dysfunction.  She was admitted in December 2022 with acute on chronic HFpEF.  She required IV diuresis and was discharged home on 40 mg Lasix.  Leg swelling is better on lower dose amlodipine.  Of note she was not able to tolerate Jardiance or Farxiga due to UTI.  Spironolactone was also stopped due to NADER. Weight up 7 pounds from last office visit at admit, cont IV diuresis  4. Pulmonary hypertension: Severe on echocardiogram from June 2019, but mild on July 2022 echocardiogram.  Probably related in part to degree of decompensation at time of echos, await echo today  5. PVCs: These are chronic and stable she is on 12.5 mg metoprolol tartrate twice daily.   6. Hypertension:  Incompletely controlled, add hydralazine  7. CKD 3B: Renal function was stable while she was hospitalized in early June 2022 and then December 2022.  She follows with Dr. Art Wick.  She had NADER with spironolactone.  8. Thrombocytopenia:Plt 134 today. She has followed with hematology.  9. 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      Kel Villalba III, MD  [facility]  04/05/23  08:02 EDT

## 2023-04-05 NOTE — THERAPY EVALUATION
Patient Name: Helena Carmen  : 1931    MRN: [id number]                              Today's Date: 2023       Admit Date: 2023    Visit Dx:     ICD-10-CM ICD-9-CM   1. Acute on chronic combined systolic and diastolic congestive heart failure  I50.43 428.43     428.0   2. History of coronary artery disease  Z86.79 V12.59   3. Chronic renal impairment, unspecified CKD stage  N18.9 585.9   4. Dyspnea, unspecified type  R06.00 786.09   5. Chronic anemia  D64.9 285.9   6. Elevated troponin  R77.8 790.6   7. Elevated brain natriuretic peptide (BNP) level  R79.89 790.99     Patient Active Problem List   Diagnosis   • Aortic valve stenosis   • Fatigue   • MI (mitral incompetence)   • PVC (premature ventricular contraction)   • Legally blind   • Essential hypertension   • Hypertriglyceridemia   • Pulmonary hypertension   • Paroxysmal atrial fibrillation   • Anxiety   • Stage 4 chronic kidney disease   • Chronic pain disorder   • Degeneration of lumbar intervertebral disc   • Type 2 diabetes mellitus with hyperglycemia   • Esophageal reflux   • Gastroparesis   • Hiatal hernia   • Iatrogenic hypothyroidism   • Macular degeneration   • Malignant neoplasm of uterus   • Osteoarthritis of knee   • Peripheral nerve disease   • Secondary hyperparathyroidism   • Thrombocytopenia   • Chronic heart failure with preserved ejection fraction   • Other cirrhosis of liver   • Hypothyroidism   • Nonrheumatic tricuspid valve regurgitation   • Anemia, chronic disease   • Hyponatremia   • Closed fracture of fifth lumbar vertebra   • Closed fracture of fifth lumbar vertebra, unspecified fracture morphology, initial encounter   • Diabetic polyneuropathy associated with type 2 diabetes mellitus   • Chronic ITP (idiopathic thrombocytopenia)   • Chronic midline low back pain with bilateral sciatica   • Acute deep vein thrombosis of calf, bilateral   • Acute left-sided low back pain with left-sided sciatica   • Urine retention   • Rib  fracture   • Acute on chronic combined systolic and diastolic congestive heart failure     Past Medical History:   Diagnosis Date   • Aortic valve stenosis     s/p tissue AVR   • Back pain    • CKD (chronic kidney disease)    • Colitis due to Clostridioides difficile 01/26/2022   • Diastolic dysfunction     Grade 2 per echocardiogram 2013   • Diverticulosis    • Exertional shortness of breath    • Heart disease    • Hiatal hernia    • Hyperlipidemia    • Hypertension    • Hyperthyroidism    • Hypertriglyceridemia 05/31/2018   • Hypothyroidism    • L5 compression fracture (HCC) 08/2022   • Left ventricular hypertrophy    • Legally blind    • Liver disease    • Low back pain    • Macular degeneration    • Mitral regurgitation    • Osteoarthritis of hip    • Osteoporosis    • Pancreatitis 01/26/2022   • Paroxysmal atrial fibrillation    • Peripheral neuropathy    • Premature ventricular contractions    • Pulmonary hypertension    • Renal insufficiency syndrome    • Type 2 diabetes mellitus    • Uterine cancer      Past Surgical History:   Procedure Laterality Date   • AORTIC VALVE REPAIR/REPLACEMENT     • CATARACT EXTRACTION      1970, 1999   • CHOLECYSTECTOMY     • ENDOSCOPY  08/15/2014    no gross lesions in stomach/duodenum, erythrematous mucosa in stomach   • EPIDURAL BLOCK     • HYSTERECTOMY  2007   • STERNOTOMY        General Information     Row Name 04/05/23 1403          Physical Therapy Time and Intention    Document Type evaluation  -EL     Mode of Treatment co-treatment;physical therapy  -EL     Row Name 04/05/23 1403          General Information    Patient Profile Reviewed yes  -EL     Prior Level of Function min assist:;mod assist:;all household mobility;ADL's  -EL     Existing Precautions/Restrictions fall  -EL     Barriers to Rehab visual deficit;medically complex;previous functional deficit  -EL     Row Name 04/05/23 1407          Living Environment    People in Home alone;other (see comments)  with  24/7 care  -EL     Row Name 04/05/23 1403          Home Main Entrance    Number of Stairs, Main Entrance one  -EL     Stair Railings, Main Entrance none  -EL     Row Name 04/05/23 1403          Stairs Within Home, Primary    Number of Stairs, Within Home, Primary none  -EL     Row Name 04/05/23 1403          Cognition    Orientation Status (Cognition) oriented x 3  -EL     Row Name 04/05/23 1403          Safety Issues, Functional Mobility    Impairments Affecting Function (Mobility) balance;postural/trunk control;range of motion (ROM);endurance/activity tolerance;strength  -EL           User Key  (r) = Recorded By, (t) = Taken By, (c) = Cosigned By    Initials Name Provider Type    Teresa Bhatia, PT Physical Therapist               Mobility     Row Name 04/05/23 1404          Bed Mobility    Bed Mobility supine-sit;sit-supine;scooting/bridging  -EL     Scooting/Bridging [place] (Bed Mobility) dependent (less than 25% patient effort);2 person assist  -EL     Supine-Sit [place] (Bed Mobility) standby assist  -EL     Sit-Supine [place] (Bed Mobility) standby assist  -EL     Assistive Device (Bed Mobility) bed rails;head of bed elevated  -     Row Name 04/05/23 1404          Bed-Chair Transfer    Bed-Chair [place] (Transfers) not tested  -     Row Name 04/05/23 1404          Sit-Stand Transfer    Sit-Stand [place] (Transfers) contact guard  -EL     Assistive Device (Sit-Stand Transfers) walker, front-wheeled  -EL     Row Name 04/05/23 1404          Gait/Stairs (Locomotion)    [place] Level (Gait) contact guard  -EL     Assistive Device (Gait) walker, front-wheeled  -EL     Distance in Feet (Gait) 20  -EL     Deviations/Abnormal Patterns (Gait) paul decreased;gait speed decreased;stride length decreased  -EL     Bilateral Gait Deviations forward flexed posture  -EL           User Key  (r) = Recorded By, (t) = Taken By, (c) = Cosigned By    Initials Name Provider Type    LAURIE  Teresa Hoyos, CANDY Physical Therapist               Obj/Interventions     Row Name 04/05/23 1404          Range of Motion Comprehensive    General Range of Motion neck/trunk range of motion deficits identified  -EL     Comment, General Range of Motion Lacks active trunk extension contributing to significant forward flexed posture  -EL     Row Name 04/05/23 1404          Strength Comprehensive (MMT)    General Manual Muscle Testing (MMT) Assessment lower extremity strength deficits identified  -EL     Comment, General Manual Muscle Testing (MMT) Assessment RLE 4-/5, LLE 3+/5  -EL     Row Name 04/05/23 1404          Motor Skills    Motor Skills functional endurance  -EL     Functional Endurance poor  -EL     Therapeutic Exercise other (see comments)  Seated B LAQ, B ankle pumps, B marches x 10 reps each  -EL     Row Name 04/05/23 1404          Balance    Balance Assessment sitting static balance;sitting dynamic balance;standing static balance;standing dynamic balance  -EL     Static Sitting Balance standby assist  -EL     Dynamic Sitting Balance standby assist  -EL     Position, Sitting Balance sitting edge of bed  -EL     Static Standing Balance contact guard  -EL     Dynamic Standing Balance contact guard  -EL     Position/Device Used, Standing Balance walker, rolling  -EL           User Key  (r) = Recorded By, (t) = Taken By, (c) = Cosigned By    Initials Name Provider Type    EL Teresa Hoyos, CANDY Physical Therapist               Goals/Plan     Row Name 04/05/23 1408          Transfer Goal 1 (PT)    Activity/Assistive Device (Transfer Goal 1, PT) sit-to-stand/stand-to-sit;bed-to-chair/chair-to-bed  -EL     [place] Level/Cues Needed (Transfer Goal 1, PT) supervision required  -EL     Time Frame (Transfer Goal 1, PT) 1 week  -     Row Name 04/05/23 1408          Gait Training Goal 1 (PT)    Activity/Assistive Device (Gait Training Goal 1, PT) gait (walking locomotion);decrease fall risk;increase  endurance/gait distance;walker, rolling  -EL     [place] Level (Gait Training Goal 1, PT) supervision required  -EL     Distance (Gait Training Goal 1, PT) 60  -EL     Time Frame (Gait Training Goal 1, PT) 1 week  -EL           User Key  (r) = Recorded By, (t) = Taken By, (c) = Cosigned By    Initials Name Provider Type    Teresa Bhatia, PT Physical Therapist               Clinical Impression     Row Name 04/05/23 140          Pain    Pretreatment Pain Rating 0/10 - no pain  -EL     Posttreatment Pain Rating 0/10 - no pain  -EL     Row Name 04/05/23 1402          Plan of Care Review    Plan of Care Reviewed With patient;family  -EL     Outcome Evaluation The fanny is a 89 year old female admitted to the hospital with acute on chronic CHF. PMH includes HTN, afib, CKD, DM, uterine cancer, hypothroidism, and macular degeneration. She is legally blind. Prior to admission, the patient had 24/7 care at home and needed assistance for ADLs. She used a rw for mobility. Upon evaluation today, she requires standby assist for bed mobility. She is able to stand and ambulate 20 ft with rw and CGA. She exhibits limitations in LE strength, trunk ROM, endurance, and balance impacting functional mobility. She would benefit from additional skilled PT to address the stated deficits and return to her previous level of function. Anticipate home with 24/7 care.  -EL     Row Name 04/05/23 140          Therapy Assessment/Plan (PT)    Patient/Family Therapy Goals Statement (PT) Go home  -EL     Rehab Potential (PT) good, to achieve stated therapy goals  -EL     Criteria for Skilled Interventions Met (PT) yes;meets criteria;skilled treatment is necessary  -EL     Therapy Frequency (PT) 3 times/wk  -EL     Row Name 04/05/23 1409          Positioning and Restraints    Pre-Treatment Position in bed  -EL     Post Treatment Position bed  -EL     In Bed supine;exit alarm on;encouraged to call for assist;call light within reach;with  family/caregiver  -EL           User Key  (r) = Recorded By, (t) = Taken By, (c) = Cosigned By    Initials Name Provider Type    Teresa Bhatia PT Physical Therapist               Outcome Measures     Row Name 04/05/23 1409          How much help from another person do you currently need...    Turning from your back to your side while in flat bed without using bedrails? 3  -EL     Moving from lying on back to sitting on the side of a flat bed without bedrails? 3  -EL     Moving to and from a bed to a chair (including a wheelchair)? 3  -EL     Standing up from a chair using your arms (e.g., wheelchair, bedside chair)? 3  -EL     Climbing 3-5 steps with a railing? 1  -EL     To walk in hospital room? 3  -EL     AM-PAC 6 Clicks Score (PT) 16  -EL     Highest level of mobility 5 --> Static standing  -EL     Row Name 04/05/23 1409          Functional Assessment    Outcome Measure Options AM-PAC 6 Clicks Basic Mobility (PT)  -EL           User Key  (r) = Recorded By, (t) = Taken By, (c) = Cosigned By    Initials Name Provider Type    Teresa Bhatia PT Physical Therapist                             Physical Therapy Education     Title: PT OT SLP Therapies (Done)     Topic: Physical Therapy (Done)     Point: Mobility training (Done)     Learning Progress Summary           Patient Acceptance, E, VU by  at 4/5/2023 1409                   Point: Home exercise program (Done)     Learning Progress Summary           Patient Acceptance, E, VU by  at 4/5/2023 1409                   Point: Body mechanics (Done)     Learning Progress Summary           Patient Acceptance, E, VU by  at 4/5/2023 1409                   Point: Precautions (Done)     Learning Progress Summary           Patient Acceptance, E, VU by  at 4/5/2023 1409                               User Key     Initials Effective Dates Name Provider Type Discipline     11/16/21 -  Teresa Hoyos PT Physical Therapist PT              PT Recommendation  and Plan     Plan of Care Reviewed With: patient, family  Outcome Evaluation: The fanny is a 89 year old female admitted to the hospital with acute on chronic CHF. PMH includes HTN, afib, CKD, DM, uterine cancer, hypothroidism, and macular degeneration. She is legally blind. Prior to admission, the patient had 24/7 care at home and needed assistance for ADLs. She used a rw for mobility. Upon evaluation today, she requires standby assist for bed mobility. She is able to stand and ambulate 20 ft with rw and CGA. She exhibits limitations in LE strength, trunk ROM, endurance, and balance impacting functional mobility. She would benefit from additional skilled PT to address the stated deficits and return to her previous level of function. Anticipate home with 24/7 care.     Time Calculation:    PT Charges     Row Name 04/05/23 1410             Time Calculation    Start Time 1256  -EL      Stop Time 1319  -EL      Time Calculation (min) 23 min  -EL      PT Received On 04/05/23  -EL      PT - Next Appointment 04/07/23  -EL      PT Goal Re-Cert Due Date 04/12/23  -EL         Time Calculation- PT    Total Timed Code Minutes- PT 18 minute(s)  -EL            User Key  (r) = Recorded By, (t) = Taken By, (c) = Cosigned By    Initials Name Provider Type    Teresa Bhatia PT Physical Therapist              Therapy Charges for Today     Code Description Service Date Service Provider Modifiers Qty    [id number]  PT EVAL MOD COMPLEXITY 1 4/5/2023 Teresa Hoyos, PT GP 1    [id number] HC PT THER PROC EA 15 MIN 4/5/2023 Teresa Hoyos, PT GP 1          PT G-Codes  Outcome Measure Options: AM-PAC 6 Clicks Basic Mobility (PT)  AM-PAC 6 Clicks Score (PT): 16  PT Discharge Summary  Anticipated Discharge Disposition (PT): home with 24/7 care    Teresa Hoyos PT  4/5/2023

## 2023-04-05 NOTE — CASE MANAGEMENT/SOCIAL WORK
Discharge Planning Assessment  [facility]     Patient Name: Helena Carmen  MRN: [id number]  Today's Date: 4/5/2023    Admit Date: 4/4/2023    Plan: Plan home with family and Caret[facility] NANCY Ashford RN   Discharge Needs Assessment     Row Name 04/05/23 1015       Living Environment    People in Home alone    Current Living Arrangements home    Primary Care Provided by self    Provides Primary Care For no one, unable/limited ability to care for self    Family Caregiver if Needed child(edison), adult;grandchild(edison), adult    Family Caregiver Names Her granddaughter  ( Dasia Nelson [phone]  ) stays with her in the day time and one of her children - daughter (Radha Hoyos [phone]), daughter  ( Margaret Loco [phone]) and son ( Brandon Carmen [phone] stay with her at night.    Quality of Family Relationships helpful;involved;supportive    Able to Return to Prior Arrangements yes    Living Arrangement Comments Pt lives alone in a single story house.  Her granddaughter  ( Dasia Nelson [phone]  ) stays with her in the day time and one of her children - daughter (Radha Hoyos [phone]), daughter  ( Margaret Loco [phone]) and son ( Brandon Carmen [phone] stay with her at night.       Resource/Environmental Concerns    Resource/Environmental Concerns none    Transportation Concerns none       Transition Planning    Patient/Family Anticipates Transition to home with family    Patient/Family Anticipated Services at Transition none    Transportation Anticipated family or friend will provide       Discharge Needs Assessment    Readmission Within the Last 30 Days no previous admission in last 30 days    Equipment Currently Used at Home bp cuff;cane, straight;glucometer;grab bar;rollator;shower chair;walker, rolling;wheelchair    Concerns to be Addressed no discharge needs identified;denies needs/concerns at this time    Anticipated Changes Related to Illness none    Equipment Needed  After Discharge bp cuff;cane, straight;grab bar, tub/shower;glucometer;rollator;shower chair;walker, rolling;walker, standard               Discharge Plan     Row Name 04/05/23 1030       Plan    Plan Plan home with family and Caretenders .   RITA Ashford RN    Patient/Family in Agreement with Plan yes    Plan Comments FACE SHEET VERIFIED/ MEDEIROS SIGNED.  Spoke with pt and pt's daughter ( Radha) at bedside.  Pt's PCP is Dr. Mariah Hickman. Pt lives alone in a single story house but her granddaughter  ( Dasia Nelson [phone]  ) stays with her in the day time and one of her children - daughter (Radha Hoyos [phone]), daughter  ( Margaret Loco [phone]) and son ( Brandon Carmen [phone] stay with her at night.  Pt is independent with ADLs.  Pt has a B/P cuff, cane, grab bar, glucometer, rollator, shower chair, rolling walker and transport chair for home use if needed.  Pt gets her [facility] [place].  Pt denies any issues affording medications.  [phone]  Pt has been in [facility] for skilled care.  Pt denies any discharge needs at present.  Pt's family will transport pt home.  Plan home with family and Caretenders Shannon Lopez RN              Continued Care and Services - Admitted Since 4/4/2023     Home Medical Care     Service Provider Request Status Selected Services Address Phone Fax Patient Preferred    [facility] Accepted N/A [address], [address], [facility] [address] [phone] --            Selected Continued Care - Prior Encounters Includes continued care and service providers with selected services from prior encounters from 1/4/2023 to 4/5/2023    Discharged on 3/14/2023 Admission date: 3/7/2023 - Discharge disposition: Skilled Nursing Facility (DC - External)    Destination     Service Provider Selected Services Address  Phone Fax Patient Preferred    [facility] [address] [facility] [address] [facility] [address] [phone] --          Home Medical Care     Service Provider Selected Services Address Phone Fax Patient Preferred    [facility] [address] [place] [address] [phone] --                    Expected Discharge Date and Time     Expected Discharge Date Expected Discharge Time    Apr 8, 2023          Demographic Summary     Row Name 04/05/23 1014       General Information    Admission Type observation    Arrived From emergency department    Required Notices Provided Observation Status Notice    Referral Source admission list    Reason for Consult discharge planning    Preferred Language English               Functional Status     Row Name 04/05/23 1015       Functional Status    Usual Activity Tolerance moderate    Current Activity Tolerance fair       Functional Status, IADL    Medications assistive person    Meal Preparation assistive person    Housekeeping assistive person    Laundry assistive person    Shopping assistive person       Mental Status    General Appearance WDL WDL               Psychosocial    No documentation.                Abuse/Neglect    No documentation.                Legal    No documentation.                Substance Abuse    No documentation.                Patient Forms    No documentation.                   Radha Ashford, RN

## 2023-04-05 NOTE — PROGRESS NOTES
Nutrition Services    Patient Name:  Helena Carmen  YOB: 1931  MRN: [id number]  Admit Date:  4/4/2023    Assessment Date:  04/05/23    Comment: Nutrition assessment triggered by chart skin report.  Admitted with acute on chronic CHF.  Worsening SOB.  Recent admission with back pain and decreased mobility.  Increased BLE swelling.      No PO intake available per chart review.  No weight loss noted.      Noted to have a stage II pressure injury to sacral region per wound RN note.    Adding Rhett BID to help promote wound healing.    RD to continue to follow.    CLINICAL NUTRITION ASSESSMENT      Reason for Assessment Pressure Injury and/or Non-Healing Wound     Diagnosis/Problem   Acute on chronic CHF   Medical/Surgical History Past Medical History:   Diagnosis Date   • Aortic valve stenosis     s/p tissue AVR   • Back pain    • CKD (chronic kidney disease)    • Colitis due to Clostridioides difficile 01/26/2022   • Diastolic dysfunction     Grade 2 per echocardiogram 2013   • Diverticulosis    • Exertional shortness of breath    • Heart disease    • Hiatal hernia    • Hyperlipidemia    • Hypertension    • Hyperthyroidism    • Hypertriglyceridemia 05/31/2018   • Hypothyroidism    • L5 compression fracture (HCC) 08/2022   • Left ventricular hypertrophy    • Legally blind    • Liver disease    • Low back pain    • Macular degeneration    • Mitral regurgitation    • Osteoarthritis of hip    • Osteoporosis    • Pancreatitis 01/26/2022   • Paroxysmal atrial fibrillation    • Peripheral neuropathy    • Premature ventricular contractions    • Pulmonary hypertension    • Renal insufficiency syndrome    • Type 2 diabetes mellitus    • Uterine cancer        Past Surgical History:   Procedure Laterality Date   • AORTIC VALVE REPAIR/REPLACEMENT     • CATARACT EXTRACTION      1970, 1999   • CHOLECYSTECTOMY     • ENDOSCOPY  08/15/2014    no gross lesions in stomach/duodenum, erythrematous mucosa in stomach   •  "EPIDURAL BLOCK     • HYSTERECTOMY  2007   • STERNOTOMY          Encounter Information        Nutrition History:     Food Preferences:    Supplements:    Factors Affecting Intake: No factors at this time     Anthropometrics        Current Height  Current Weight  BMI kg/m2 Height: 144 cm (56.69\")  Weight: 77 kg (169 lb 12.1 oz) (04/05/23 0843)  Body mass index is 37.13 kg/m².   Adjusted BMI (if applicable)        Admission Weight 169 lb (4/5)       Ideal Body Weight (IBW) 99 lb (45 kg)   Adjusted IBW (if applicable)        Usual Body Weight (UBW) 165 lb (4/2022)   Weight Change/Trend Stable       Weight History Wt Readings from Last 30 Encounters:   04/05/23 0843 77 kg (169 lb 12.1 oz)   04/05/23 0524 77 kg (169 lb 12.1 oz)   04/04/23 1631 76.6 kg (168 lb 14 oz)   04/04/23 1203 76.2 kg (168 lb)   03/07/23 1818 75.1 kg (165 lb 9.1 oz)   03/07/23 1120 72.6 kg (160 lb)   03/07/23 1451 72.6 kg (160 lb)   03/01/23 1428 73.5 kg (162 lb)   01/17/23 1445 72.1 kg (159 lb)   12/28/22 1332 70.8 kg (156 lb)   12/25/22 0407 73.6 kg (162 lb 4.8 oz)   12/24/22 0536 72.2 kg (159 lb 1.6 oz)   12/23/22 0417 72.4 kg (159 lb 9.6 oz)   12/22/22 0502 74.8 kg (164 lb 12.8 oz)   12/21/22 0541 74.7 kg (164 lb 11.2 oz)   12/20/22 1646 70.7 kg (155 lb 14.4 oz)   12/20/22 1059 75.8 kg (167 lb)   11/04/22 1252 73.5 kg (162 lb)   10/16/22 1433 70.3 kg (155 lb)   09/27/22 1350 73 kg (161 lb)   09/20/22 1415 71.7 kg (158 lb)   09/15/22 1143 71.7 kg (158 lb)   09/14/22 1344 71.7 kg (158 lb)   09/09/22 1319 73.5 kg (162 lb)   09/09/22 1352 73.5 kg (162 lb)   09/06/22 1323 75.2 kg (165 lb 12.8 oz)   08/24/22 1427 70.3 kg (155 lb)   08/24/22 1706 70.3 kg (155 lb)   08/22/22 1359 70.3 kg (155 lb)   08/22/22 1523 70.3 kg (155 lb)   08/06/22 0412 72.1 kg (158 lb 15.2 oz)   07/31/22 1556 76.2 kg (168 lb)   07/30/22 1447 76.5 kg (168 lb 10.4 oz)   07/30/22 1038 72.1 kg (159 lb)   07/26/22 1253 72.1 kg (159 lb)   07/22/22 1412 72.9 kg (160 lb 12.8 oz) " "  07/11/22 1330 73.9 kg (163 lb)   06/27/22 1338 73 kg (161 lb)   06/06/22 0434 67.1 kg (148 lb)   06/05/22 1159 73.9 kg (163 lb)   05/18/22 1339 72.6 kg (160 lb)   05/04/22 1426 72.3 kg (159 lb 6.4 oz)   04/25/22 1508 74.9 kg (165 lb 3.2 oz)           --  Estimated/Assessed Needs       Energy Requirements    Height for Calculation  Height: 144 cm (56.69\")   Weight for Calculation 99 lb (45 kg) IBW   Method for Estimation  30 kcal/kg, 35 kcal/kg   EST Needs (kcal/day) 1693-0450       Protein Requirements    Weight for Calculation 169 lb (77 kg)   EST Protein Needs (g/kg) 0.8 gm/kg   EST Daily Needs (g/day) 62       Fluid Requirements     Method for Estimation 1 mL/kcal    Estimated Needs (mL/day) 1400     Tests/Procedures        Tests/Procedures No new tests/procedures     Labs       Pertinent Labs    Results from last 7 days   Lab Units 04/05/23  0453 04/04/23  1125   SODIUM mmol/L 133* 133*   POTASSIUM mmol/L 4.6 4.6   CHLORIDE mmol/L 97* 101   CO2 mmol/L 23.5 23.7   BUN mg/dL 31* 24*   CREATININE mg/dL 1.54* 1.21*   CALCIUM mg/dL 8.5 8.4   BILIRUBIN mg/dL 0.4 0.4   ALK PHOS U/L 63 69   ALT (SGPT) U/L 24 36*   AST (SGOT) U/L 22 22   GLUCOSE mg/dL 219* 113*     Results from last 7 days   Lab Units 04/05/23  0453 04/04/23  1125   MAGNESIUM mg/dL 1.9 2.0   HEMOGLOBIN g/dL 9.6* 9.9*   HEMATOCRIT % 32.6* 32.5*   WBC 10*3/mm3 5.47 8.20   ALBUMIN g/dL 3.0* 3.3*     Results from last 7 days   Lab Units 04/05/23  0453 04/04/23  1125   PLATELETS 10*3/mm3 134* 144     COVID19   Date Value Ref Range Status   03/07/2023 Not Detected Not Detected - Ref. Range Final     Lab Results   Component Value Date    HGBA1C 5.30 04/05/2023          Medications           Scheduled Medications budesonide-formoterol, 2 puff, Inhalation, BID - RT  carvedilol, 12.5 mg, Oral, BID With Meals  famotidine, 40 mg, Oral, Nightly  furosemide, 40 mg, Intravenous, Q12H  gabapentin, 600 mg, Oral, Q12H  heparin (porcine), 5,000 Units, Subcutaneous, " Q12H  hydrALAZINE, 25 mg, Oral, Q8H  HYDROcodone-acetaminophen, 1 tablet, Oral, Q6H  insulin glargine, 20 Units, Subcutaneous, Nightly  insulin lispro, 0-9 Units, Subcutaneous, TID AC  levothyroxine, 50 mcg, Oral, Q AM  montelukast, 10 mg, Oral, Nightly  [START ON 4/6/2023] pantoprazole, 40 mg, Oral, Q AM  sennosides-docusate, 2 tablet, Oral, Nightly  sodium chloride, 10 mL, Intravenous, Q12H       Infusions     PRN Medications •  acetaminophen **OR** acetaminophen **OR** acetaminophen  •  bismuth subsalicylate  •  calcium carbonate  •  dextrose  •  dextrose  •  glucagon (human recombinant)  •  LORazepam  •  nitroglycerin  •  ondansetron  •  sodium chloride  •  sodium chloride  •  sodium chloride     Physical Findings          Physical Appearance alert, obese, oriented, room air   Oral/Mouth Cavity teeth missing   Edema  lower extremity , 1+ (trace), 2+ (mild)   Gastrointestinal nausea, last bowel movement:4/4   Skin  pressure injury st II sacral PI per wound RN note    Tubes/Drains none   NFPE Not applicable at this time   --  Current Nutrition Orders & Evaluation of Intake       Oral Nutrition     Food Allergies NKFA   Current PO Diet Diet: Cardiac Diets, Diabetic Diets, Renal Diets; Healthy Heart (2-3 Na+); Consistent Carbohydrate; Low Sodium (2-3g), Low Potassium, Low Phosphorus; Texture: Regular Texture (IDDSI 7); Fluid Consistency: Thin (IDDSI 0)   Supplement n/a   PO Evaluation     % PO Intake No intake available     # of Days Evaluated    --  PES STATEMENT / NUTRITION DIAGNOSIS      Nutrition Dx Problem  Problem: Increased Nutrient Needs  Etiology: Medical Diagnosis (pressure injury)  Signs/Symptoms: Report/Observation    Comment:    --  NUTRITION INTERVENTION / PLAN OF CARE      Intervention Goal(s) Maintain nutrition status, Reduce/improve symptoms, Meet estimated needs, Disease management/therapy, Establish PO intake, Tolerate PO  and Appropriate weight loss         RD Intervention/Action Follow Tx  Progress, Care plan reviewed and Recommend/ordered: ONS         Prescription/Orders:       PO Diet       Supplements Rhett BID      Snacks       Enteral Nutrition       Parenteral Nutrition    New Prescription Ordered? Yes   --      Monitor/Evaluation Per protocol, PO intake, Supplement intake, Pertinent labs, Weight, Skin status, Symptoms   Discharge Plan/Needs Pending clinical course   Education Will instruct as appropriate   --    RD to follow per protocol.      Electronically signed by:  Cassie Velarde RD  04/05/23 15:24 EDT

## 2023-04-05 NOTE — NURSING NOTE
CWON note: pt seen for evaluation of sacral skin issues POA. Pt with several scattered areas of partial thickness wounds across her back, sacral skin with stage 2 pressure injuries POA, Optifoam dressings are in place. Pt is alert and oriented, able to turn with some assistance. She is on an accumax mattress with added pump for adequate pressure redistribution.  Heels are negative for skin breakdown or erythema. Heels off-loaded with pillows under her calves. Wound care and prevention standing orders placed into Epic. Please re-consult for any additional needs.

## 2023-04-05 NOTE — DISCHARGE PLACEMENT REQUEST
"Helena Gruber (89 y.o. Female)     Date of Birth   02/20/1931    Social Security Number       Address   [address] [place] [address]    Home Phone   [phone]    MRN   [id number]       Scientologist   Rastafari    Marital Status                               Admission Date   4/4/23    Admission Type   Emergency    Admitting Provider   Dheeraj Lynch MD    Attending Provider   Brian Bella MD    Department, Room/Bed   [address], [address]/1       Discharge Date       Discharge Disposition       Discharge Destination                               Attending Provider: Brian Bella MD    Allergies: Baclofen, Erythromycin, Statins, Cephalexin, Penicillins, Sulfa Antibiotics    Isolation: None   Infection: None   Code Status: No CPR    Ht: 144 cm (56.69\")   Wt: 77 kg (169 lb 12.1 oz)    Admission Cmt: None   Principal Problem: Acute on chronic combined systolic and diastolic congestive heart failure [I50.43]                 Active Insurance as of 4/4/2023     Primary Coverage     Payor Plan Insurance Group Employer/Plan Group    MEDICARE MEDICARE A & B      Payor Plan Address Payor Plan Phone Number Payor Plan Fax Number Effective Dates    [address] [phone]  2/1/1996 - None Entered    [facility] [id number]       Subscriber Name Subscriber Birth Date Member ID       HELENA GRUBER 2/20/1931 [id number]           Secondary Coverage     Payor Plan Insurance Group Employer/Plan Group     FOR LIFE  FOR LIFE MC [id number]       Payor Plan Address Payor Plan Phone Number Payor Plan Fax Number Effective Dates    [address] [phone]  4/5/2016 - None Entered    [facility] [address]       Subscriber Name Subscriber Birth Date Member ID       HELENA GRUBER 2/20/1931 [id number]                 Emergency Contacts      (Rel.) Home Phone Work Phone Mobile Phone    QUAN GRUBER (Son) [phone] -- --    SohaRadha (Daughter) [phone] -- " [phone]    Margaret Loco (Daughter) [phone] -- [phone]    ARAM SPAIN (Grandchild) -- -- [phone]

## 2023-04-05 NOTE — PLAN OF CARE
Goal Outcome Evaluation:  Plan of Care Reviewed With: patient, family           Outcome Evaluation: The fanny is a 89 year old female admitted to the hospital with acute on chronic CHF. PMH includes HTN, afib, CKD, DM, uterine cancer, hypothroidism, and macular degeneration. She is legally blind. Prior to admission, the patient had 24/7 care at home and needed assistance for ADLs. She used a rw for mobility. Upon evaluation today, she requires standby assist for bed mobility. She is able to stand and ambulate 20 ft with rw and CGA. She exhibits limitations in LE strength, trunk ROM, endurance, and balance impacting functional mobility. She would benefit from additional skilled PT to address the stated deficits and return to her previous level of function. Anticipate home with 24/7 care.

## 2023-04-05 NOTE — THERAPY EVALUATION
Patient Name: Helena Carmen  : 1931    MRN: [id number]                              Today's Date: 2023       Admit Date: 2023    Visit Dx:     ICD-10-CM ICD-9-CM   1. Acute on chronic combined systolic and diastolic congestive heart failure  I50.43 428.43     428.0   2. History of coronary artery disease  Z86.79 V12.59   3. Chronic renal impairment, unspecified CKD stage  N18.9 585.9   4. Dyspnea, unspecified type  R06.00 786.09   5. Chronic anemia  D64.9 285.9   6. Elevated troponin  R77.8 790.6   7. Elevated brain natriuretic peptide (BNP) level  R79.89 790.99     Patient Active Problem List   Diagnosis   • Aortic valve stenosis   • Fatigue   • MI (mitral incompetence)   • PVC (premature ventricular contraction)   • Legally blind   • Essential hypertension   • Hypertriglyceridemia   • Pulmonary hypertension   • Paroxysmal atrial fibrillation   • Anxiety   • Stage 4 chronic kidney disease   • Chronic pain disorder   • Degeneration of lumbar intervertebral disc   • Type 2 diabetes mellitus with hyperglycemia   • Esophageal reflux   • Gastroparesis   • Hiatal hernia   • Iatrogenic hypothyroidism   • Macular degeneration   • Malignant neoplasm of uterus   • Osteoarthritis of knee   • Peripheral nerve disease   • Secondary hyperparathyroidism   • Thrombocytopenia   • Chronic heart failure with preserved ejection fraction   • Other cirrhosis of liver   • Hypothyroidism   • Nonrheumatic tricuspid valve regurgitation   • Anemia, chronic disease   • Hyponatremia   • Closed fracture of fifth lumbar vertebra   • Closed fracture of fifth lumbar vertebra, unspecified fracture morphology, initial encounter   • Diabetic polyneuropathy associated with type 2 diabetes mellitus   • Chronic ITP (idiopathic thrombocytopenia)   • Chronic midline low back pain with bilateral sciatica   • Acute deep vein thrombosis of calf, bilateral   • Acute left-sided low back pain with left-sided sciatica   • Urine retention   • Rib  fracture   • Acute on chronic combined systolic and diastolic congestive heart failure     Past Medical History:   Diagnosis Date   • Aortic valve stenosis     s/p tissue AVR   • Back pain    • CKD (chronic kidney disease)    • Colitis due to Clostridioides difficile 01/26/2022   • Diastolic dysfunction     Grade 2 per echocardiogram 2013   • Diverticulosis    • Exertional shortness of breath    • Heart disease    • Hiatal hernia    • Hyperlipidemia    • Hypertension    • Hyperthyroidism    • Hypertriglyceridemia 05/31/2018   • Hypothyroidism    • L5 compression fracture (HCC) 08/2022   • Left ventricular hypertrophy    • Legally blind    • Liver disease    • Low back pain    • Macular degeneration    • Mitral regurgitation    • Osteoarthritis of hip    • Osteoporosis    • Pancreatitis 01/26/2022   • Paroxysmal atrial fibrillation    • Peripheral neuropathy    • Premature ventricular contractions    • Pulmonary hypertension    • Renal insufficiency syndrome    • Type 2 diabetes mellitus    • Uterine cancer      Past Surgical History:   Procedure Laterality Date   • AORTIC VALVE REPAIR/REPLACEMENT     • CATARACT EXTRACTION      1970, 1999   • CHOLECYSTECTOMY     • ENDOSCOPY  08/15/2014    no gross lesions in stomach/duodenum, erythrematous mucosa in stomach   • EPIDURAL BLOCK     • HYSTERECTOMY  2007   • STERNOTOMY        General Information     Row Name 04/05/23 1544          OT Time and Intention    Document Type evaluation  -KA     Mode of Treatment co-treatment;physical therapy;occupational therapy  -     Row Name 04/05/23 1544          General Information    Patient Profile Reviewed yes  -KA     Prior Level of Function min assist:;mod assist:;ADL's;all household mobility  has rwx and wc. Can walk short distances. recently transferring from wc to toilet  -KA     Existing Precautions/Restrictions fall  -KA     Barriers to Rehab medically complex;previous functional deficit;visual deficit  -     Row Name  04/05/23 1544          Living Environment    People in Home alone;other (see comments)  with 24/7 care  -     Row Name 04/05/23 1544          Home Main Entrance    Number of Stairs, Main Entrance one  -KA     Stair Railings, Main Entrance none  -[facility] Name 04/05/23 1544          Stairs Within Home, Primary    Number of Stairs, Within Home, Primary none  -[facility] Name 04/05/23 1544          Cognition    Orientation Status (Cognition) oriented x 3  -[facility] Name 04/05/23 1544          Safety Issues, Functional Mobility    Impairments Affecting Function (Mobility) balance;postural/trunk control;range of motion (ROM);endurance/activity tolerance;strength  -           User Key  (r) = Recorded By, (t) = Taken By, (c) = Cosigned By    Initials Name Provider Type    Carla Hammond OT Occupational Therapist                 Mobility/ADL's     [facility] Name 04/05/23 1545          Bed Mobility    Bed Mobility supine-sit;sit-supine;scooting/bridging  -     Scooting/Bridging [place] (Bed Mobility) dependent (less than 25% patient effort);2 person assist  -     Supine-Sit [place] (Bed Mobility) standby assist  -     Sit-Supine [place] (Bed Mobility) standby assist  -     Assistive Device (Bed Mobility) bed rails;head of bed elevated  -[facility] Name 04/05/23 1545          Bed-Chair Transfer    Bed-Chair [place] (Transfers) not tested  -KA     Row Name 04/05/23 1545          Sit-Stand Transfer    Sit-Stand [place] (Transfers) contact guard  -     Assistive Device (Sit-Stand Transfers) walker, front-wheeled  -[facility] Name 04/05/23 1545          Functional Mobility    Functional Mobility- Ind. Level contact guard assist  -     Functional Mobility- Device walker, front-wheeled  -     Functional Mobility-Distance (Feet) --  to doorway and back to bed  -[facility] Name 04/05/23 1545          Activities of Daily Living    BADL Assessment/Intervention lower body  dressing;toileting;grooming  -[facility] Name 04/05/23 1545          Lower Body Dressing Assessment/Training    [place] Level (Lower Body Dressing) lower body dressing skills;doff;don;socks;maximum assist (25% patient effort)  -[facility] Name 04/05/23 1545          Toileting Assessment/Training    Comment, (Toileting) Hx of urinary retention and jimenes cath placed. Anticipate mod A for other toileting needs  -[facility] Name 04/05/23 1545          Grooming Assessment/Training    [place] Level (Grooming) grooming skills;oral care regimen;wash face, hands;moderate assist (50% patient effort)  -     Position (Grooming) edge of bed sitting  -     Comment, (Grooming) seated EOB. Mod A due to visual deficit. required assistance opening containers and applying toothpaste to toothbrush  -           User Key  (r) = Recorded By, (t) = Taken By, (c) = Cosigned By    Initials Name Provider Type    Carla Hammond OT Occupational Therapist               Obj/Interventions     [facility] Name 04/05/23 1546          Sensory Assessment (Somatosensory)    Sensory Assessment (Somatosensory) sensation intact  -KA     Row Name 04/05/23 1546          Vision Assessment/Intervention    Vision Assessment Comment Legally blind due to macular degeneration  -[facility] Name 04/05/23 1546          Range of Motion Comprehensive    General Range of Motion bilateral upper extremity ROM WFL  -[facility] Name 04/05/23 1546          Strength Comprehensive (MMT)    General Manual Muscle Testing (MMT) Assessment upper extremity strength deficits identified  -     Comment, General Manual Muscle Testing (MMT) Assessment BUE 3+/5  -[facility] Name 04/05/23 1546          Motor Skills    Motor Skills functional endurance  -     Functional Endurance poor  -KA     Row Name 04/05/23 1546          Balance    Static Sitting Balance standby assist  -     Dynamic Sitting Balance standby assist  -     Position, Sitting Balance sitting edge of  bed  -KA     Static Standing Balance contact guard  -KA     Dynamic Standing Balance contact guard  -KA     Position/Device Used, Standing Balance walker, front-wheeled  -KA     Balance Interventions sitting;standing;occupation based/functional task  -KA           User Key  (r) = Recorded By, (t) = Taken By, (c) = Cosigned By    Initials Name Provider Type    Carla Hammond, OT Occupational Therapist               Goals/Plan     Row Name 04/05/23 1550          Transfer Goal 1 (OT)    Activity/Assistive Device (Transfer Goal 1, OT) toilet  -KA     [place] Level/Cues Needed (Transfer Goal 1, OT) standby assist  -KA     Time Frame (Transfer Goal 1, OT) short term goal (STG);2 weeks  -[facility] Name 04/05/23 1550          Bathing Goal 1 (OT)    Activity/Device (Bathing Goal 1, OT) bathing skills, all;long-handled sponge  -KA     [place] Level/Cues Needed (Bathing Goal 1, OT) minimum assist (75% or more patient effort)  -KA     Time Frame (Bathing Goal 1, OT) short term goal (STG);2 weeks  -KA     Progress/Outcomes (Bathing Goal 1, OT) goal ongoing  -[facility] Name 04/05/23 1550          Toileting Goal 1 (OT)    Activity/Device (Toileting Goal 1, OT) toileting skills, all  -KA     [place] Level/Cues Needed (Toileting Goal 1, OT) minimum assist (75% or more patient effort)  -KA     Time Frame (Toileting Goal 1, OT) short term goal (STG);2 weeks  -KA     Progress/Outcome (Toileting Goal 1, OT) goal ongoing  -[facility] Name 04/05/23 1550          Grooming Goal 1 (OT)    Activity/Device (Grooming Goal 1, OT) grooming skills, all  -KA     [place] (Grooming Goal 1, OT) minimum assist (75% or more patient effort)  -KA     Time Frame (Grooming Goal 1, OT) short term goal (STG);2 weeks  -KA     Progress/Outcome (Grooming Goal 1, OT) goal ongoing  -[facility] Name 04/05/23 1550          Therapy Assessment/Plan (OT)    Planned Therapy Interventions (OT) activity tolerance training;functional balance  retraining;occupation/activity based interventions;ROM/therapeutic exercise;strengthening exercise;transfer/mobility retraining;patient/caregiver education/training;BADL retraining  -           User Key  (r) = Recorded By, (t) = Taken By, (c) = Cosigned By    Initials Name Provider Type    Carla Hammond, CHERRY Occupational Therapist               Clinical Impression     Row Name 04/05/23 1547          Pain Assessment    Pretreatment Pain Rating 0/10 - no pain  -     Posttreatment Pain Rating 0/10 - no pain  -     Row Name 04/05/23 1547          Plan of Care Review    Plan of Care Reviewed With patient;family  -     Outcome Evaluation Pt seen for OT eval this afternoon. Admitted from home with c/o SOB and nausea with acute on chronic systolic and diastolic congestive heart failure. Pt is legally blind due to macular degeneration. Requires assistance with ADLs at home and has 24/7 care. She used a rwx for short distances. Today pt SBA for bed mobility and stood and ambulated to doorway to simulate household distances with CGA and use of rwx. Max A for socks. BUE WFL and generalized weakness noted. She presents with decreased balance, strength, functional mobility, ADL performance, endurance and activity tolerance. Pt to benefit from skilled OT to address deficits and maximize independence with ADLs. Anticipate home with 24/7 assistance  -     Row Name 04/05/23 1547          Therapy Assessment/Plan (OT)    Rehab Potential (OT) good, to achieve stated therapy goals  -     Criteria for Skilled Therapeutic Interventions Met (OT) yes  -     Therapy Frequency (OT) 5 times/wk  -     Row Name 04/05/23 1547          Therapy Plan Review/Discharge Plan (OT)    Anticipated Discharge Disposition (OT) home with 24/7 care;home with home health  -     Row Name 04/05/23 1547          Vital Signs    Pre Patient Position Supine  -KA     Intra Patient Position Standing  -KA     Post Patient Position Supine  -KA      Row Name 04/05/23 1547          Positioning and Restraints    Pre-Treatment Position in bed  -KA     Post Treatment Position bed  -KA     In Bed supine;call light within reach;encouraged to call for assist;exit alarm on;with family/caregiver  -           User Key  (r) = Recorded By, (t) = Taken By, (c) = Cosigned By    Initials Name Provider Type    Carla Hammond, OT Occupational Therapist               Outcome Measures     Row Name 04/05/23 1551          How much help from another is currently needed...    Putting on and taking off regular lower body clothing? 1  -KA     Bathing (including washing, rinsing, and drying) 2  -KA     Toileting (which includes using toilet bed pan or urinal) 2  -KA     Putting on and taking off regular upper body clothing 2  -KA     Taking care of personal grooming (such as brushing teeth) 2  -KA     Eating meals 3  -KA     AM-PAC 6 Clicks Score (OT) 12  -KA     Row Name 04/05/23 1409          How much help from another person do you currently need...    Turning from your back to your side while in flat bed without using bedrails? 3  -EL     Moving from lying on back to sitting on the side of a flat bed without bedrails? 3  -EL     Moving to and from a bed to a chair (including a wheelchair)? 3  -EL     Standing up from a chair using your arms (e.g., wheelchair, bedside chair)? 3  -EL     Climbing 3-5 steps with a railing? 1  -EL     To walk in hospital room? 3  -EL     AM-PAC 6 Clicks Score (PT) 16  -EL     Highest level of mobility 5 --> Static standing  -EL     Row Name 04/05/23 1551 04/05/23 1409       Functional Assessment    Outcome Measure Options AM-PAC 6 Clicks Daily Activity (OT)  -KA AM-PAC 6 Clicks Basic Mobility (PT)  -EL          User Key  (r) = Recorded By, (t) = Taken By, (c) = Cosigned By    Initials Name Provider Type    Teresa Bhatia, PT Physical Therapist    Carla Hammond, OT Occupational Therapist                  OT Recommendation and  Plan  Planned Therapy Interventions (OT): activity tolerance training, functional balance retraining, occupation/activity based interventions, ROM/therapeutic exercise, strengthening exercise, transfer/mobility retraining, patient/caregiver education/training, BADL retraining  Therapy Frequency (OT): 5 times/wk  Plan of Care Review  Plan of Care Reviewed With: patient, family  Outcome Evaluation: Pt seen for OT eval this afternoon. Admitted from home with c/o SOB and nausea with acute on chronic systolic and diastolic congestive heart failure. Pt is legally blind due to macular degeneration. Requires assistance with ADLs at home and has 24/7 care. She used a rwx for short distances. Today pt SBA for bed mobility and stood and ambulated to doorway to simulate household distances with CGA and use of rwx. Max A for socks. BUE WFL and generalized weakness noted. She presents with decreased balance, strength, functional mobility, ADL performance, endurance and activity tolerance. Pt to benefit from skilled OT to address deficits and maximize independence with ADLs. Anticipate home with 24/7 assistance     Time Calculation:    Time Calculation- OT     Row Name 04/05/23 1552             Time Calculation- OT    OT Start Time 1258  -KA      OT Stop Time 1319  -KA      OT Time Calculation (min) 21 min  -KA      Total Timed Code Minutes- OT 11 minute(s)  -KA      OT Received On 04/05/23  -KA      OT - Next Appointment 04/07/23  -KA      OT Goal Re-Cert Due Date 04/19/23  -KA         Timed Charges    49681 - OT Therapeutic Activity Minutes 11  -KA         Untimed Charges    OT Eval/Re-eval Minutes 10  -KA         Total Minutes    Timed Charges Total Minutes 11  -KA      Untimed Charges Total Minutes 10  -KA       Total Minutes 21  -KA            User Key  (r) = Recorded By, (t) = Taken By, (c) = Cosigned By    Initials Name Provider Type    Carla Hammond OT Occupational Therapist              Therapy Charges for Today      Code Description Service Date Service Provider Modifiers Qty    54650884941  OT EVAL MOD COMPLEXITY 2 4/5/2023 Carla Varma OT GO 1    [id number]  OT THERAPEUTIC ACT EA 15 MIN 4/5/2023 Carla Varma OT GO 1               Carla Varma OT  4/5/2023

## 2023-04-05 NOTE — CASE MANAGEMENT/SOCIAL WORK
Continued Stay Note  [facility]     Patient Name: Helena Carmen  MRN: [id number]  Today's Date: 4/5/2023    Admit Date: 4/4/2023    Plan: Plan home with family and [employer] NANCY Ashford RN   Discharge Plan     Row Name 04/05/23 1030       Plan    Plan Plan home with family and [employer] NANCY Ashford RN    Patient/Family in Agreement with Plan yes    Plan Comments FACE SHEET VERIFIED/ MEDEIROS SIGNED.  Spoke with pt and pt's daughter ( Radha) at bedside.  Pt's PCP is Dr. Mariah Hickman. Pt lives alone in a single story house but her granddaughter  ( Dasia Nelson [phone]  ) stays with her in the day time and one of her children - daughter (Radha Hoyos [phone]), daughter  ( Margaret Loco [phone]) and son ( Brandon Carmen [phone] stay with her at night.  Pt is independent with ADLs.  Pt has a B/P cuff, cane, grab bar, glucometer, rollator, shower chair, rolling walker and transport chair for home use if needed.  Pt gets her prescriptions at [facility] in [facility] and [place].  Pt denies any issues affording medications.  [phone] to [facility].  Pt has been in [facility] for skilled care.  Pt denies any discharge needs at present.  Pt's family will transport pt home.  Plan home with family and [facility] Shannon Lopez RN               Discharge Codes    No documentation.               Expected Discharge Date and Time     Expected Discharge Date Expected Discharge Time    Apr 8, 2023             Radha Ashford RN

## 2023-04-05 NOTE — PLAN OF CARE
Goal Outcome Evaluation:  Plan of Care Reviewed With: patient, family           Outcome Evaluation: Pt seen for OT janell this afternoon. Admitted from home with c/o SOB and nausea with acute on chronic systolic and diastolic congestive heart failure. Pt is legally blind due to macular degeneration. Requires assistance with ADLs at home and has 24/7 care. She used a rwx for short distances. Today pt SBA for bed mobility and stood and ambulated to doorway to simulate household distances with CGA and use of rwx. Max A for socks. BUE WFL and generalized weakness noted. She presents with decreased balance, strength, functional mobility, ADL performance, endurance and activity tolerance. Pt to benefit from skilled OT to address deficits and maximize independence with ADLs. Anticipate home with 24/7 assistance

## 2023-04-05 NOTE — PLAN OF CARE
Goal Outcome Evaluation:     Patient A&Ox4. Legally blind. Family remained at bedside throughout shift. Turned as patient would allow. Medicated per orders. Echo done today, EF 50%. No acute distress noted.

## 2023-04-05 NOTE — PLAN OF CARE
Goal Outcome Evaluation:  Plan of Care Reviewed With: patient           Outcome Evaluation: Patient  resting  at  this  time.  Alert  and  oriented.   Scheduled  pain  medication  given  when  due.   Complained  of  nausea, Zofran  x  1 effective.   Patient  continue  IV  Lasix,   Hoffman    catheter  patent.  Good  urinary  output.   PT/OT  to  eval  this  am. ECHO  also  due  this  am.  SR/SB  on  the  monitor.  Remain  on  room  air,  states  SOA  with  activity.  Nursing  will  continue  to monitor.

## 2023-04-06 LAB
ALBUMIN SERPL-MCNC: 3.4 G/DL (ref 3.5–5.2)
ANION GAP SERPL CALCULATED.3IONS-SCNC: 8.4 MMOL/L (ref 5–15)
BUN SERPL-MCNC: 35 MG/DL (ref 8–23)
BUN/CREAT SERPL: 20.7 (ref 7–25)
CALCIUM SPEC-SCNC: 9.3 MG/DL (ref 8.2–9.6)
CHLORIDE SERPL-SCNC: 96 MMOL/L (ref 98–107)
CO2 SERPL-SCNC: 28.6 MMOL/L (ref 22–29)
CREAT SERPL-MCNC: 1.69 MG/DL (ref 0.57–1)
EGFRCR SERPLBLD CKD-EPI 2021: 28.2 ML/MIN/1.73
GLUCOSE BLDC GLUCOMTR-MCNC: 135 MG/DL (ref 70–130)
GLUCOSE BLDC GLUCOMTR-MCNC: 174 MG/DL (ref 70–130)
GLUCOSE BLDC GLUCOMTR-MCNC: 212 MG/DL (ref 70–130)
GLUCOSE BLDC GLUCOMTR-MCNC: 257 MG/DL (ref 70–130)
GLUCOSE SERPL-MCNC: 188 MG/DL (ref 65–99)
PHOSPHATE SERPL-MCNC: 4 MG/DL (ref 2.5–4.5)
POTASSIUM SERPL-SCNC: 5 MMOL/L (ref 3.5–5.2)
SODIUM SERPL-SCNC: 133 MMOL/L (ref 136–145)
URATE SERPL-MCNC: 9.4 MG/DL (ref 2.4–5.7)

## 2023-04-06 PROCEDURE — 80069 RENAL FUNCTION PANEL: CPT | Performed by: HOSPITALIST

## 2023-04-06 PROCEDURE — 99232 SBSQ HOSP IP/OBS MODERATE 35: CPT | Performed by: NURSE PRACTITIONER

## 2023-04-06 PROCEDURE — 63710000001 INSULIN LISPRO (HUMAN) PER 5 UNITS: Performed by: INTERNAL MEDICINE

## 2023-04-06 PROCEDURE — 82962 GLUCOSE BLOOD TEST: CPT

## 2023-04-06 PROCEDURE — 25010000002 HEPARIN (PORCINE) PER 1000 UNITS: Performed by: INTERNAL MEDICINE

## 2023-04-06 PROCEDURE — 84550 ASSAY OF BLOOD/URIC ACID: CPT | Performed by: HOSPITALIST

## 2023-04-06 PROCEDURE — 94664 DEMO&/EVAL PT USE INHALER: CPT

## 2023-04-06 PROCEDURE — 94760 N-INVAS EAR/PLS OXIMETRY 1: CPT

## 2023-04-06 PROCEDURE — 94799 UNLISTED PULMONARY SVC/PX: CPT

## 2023-04-06 RX ORDER — FUROSEMIDE 40 MG/1
40 TABLET ORAL DAILY
Status: DISCONTINUED | OUTPATIENT
Start: 2023-04-07 | End: 2023-04-07 | Stop reason: HOSPADM

## 2023-04-06 RX ADMIN — LEVOTHYROXINE SODIUM 50 MCG: 0.05 TABLET ORAL at 06:02

## 2023-04-06 RX ADMIN — INSULIN LISPRO 6 UNITS: 100 INJECTION, SOLUTION INTRAVENOUS; SUBCUTANEOUS at 12:09

## 2023-04-06 RX ADMIN — BUDESONIDE AND FORMOTEROL FUMARATE DIHYDRATE 2 PUFF: 160; 4.5 AEROSOL RESPIRATORY (INHALATION) at 19:12

## 2023-04-06 RX ADMIN — CARVEDILOL 12.5 MG: 12.5 TABLET, FILM COATED ORAL at 17:19

## 2023-04-06 RX ADMIN — HYDRALAZINE HYDROCHLORIDE 25 MG: 25 TABLET, FILM COATED ORAL at 21:05

## 2023-04-06 RX ADMIN — FAMOTIDINE 40 MG: 20 TABLET, FILM COATED ORAL at 21:04

## 2023-04-06 RX ADMIN — HYDROCODONE BITARTRATE AND ACETAMINOPHEN 1 TABLET: 7.5; 325 TABLET ORAL at 06:02

## 2023-04-06 RX ADMIN — HEPARIN SODIUM 5000 UNITS: 5000 INJECTION INTRAVENOUS; SUBCUTANEOUS at 08:58

## 2023-04-06 RX ADMIN — CARVEDILOL 12.5 MG: 12.5 TABLET, FILM COATED ORAL at 08:59

## 2023-04-06 RX ADMIN — HYDROCODONE BITARTRATE AND ACETAMINOPHEN 1 TABLET: 7.5; 325 TABLET ORAL at 17:19

## 2023-04-06 RX ADMIN — INSULIN GLARGINE-YFGN 20 UNITS: 100 INJECTION, SOLUTION SUBCUTANEOUS at 21:04

## 2023-04-06 RX ADMIN — PANTOPRAZOLE SODIUM 40 MG: 40 TABLET, DELAYED RELEASE ORAL at 06:02

## 2023-04-06 RX ADMIN — INSULIN LISPRO 2 UNITS: 100 INJECTION, SOLUTION INTRAVENOUS; SUBCUTANEOUS at 08:59

## 2023-04-06 RX ADMIN — Medication 10 ML: at 08:59

## 2023-04-06 RX ADMIN — LORAZEPAM 0.5 MG: 0.5 TABLET ORAL at 21:05

## 2023-04-06 RX ADMIN — HYDRALAZINE HYDROCHLORIDE 25 MG: 25 TABLET, FILM COATED ORAL at 14:30

## 2023-04-06 RX ADMIN — GABAPENTIN 600 MG: 300 CAPSULE ORAL at 08:58

## 2023-04-06 RX ADMIN — HEPARIN SODIUM 5000 UNITS: 5000 INJECTION INTRAVENOUS; SUBCUTANEOUS at 21:04

## 2023-04-06 RX ADMIN — Medication 10 ML: at 21:06

## 2023-04-06 RX ADMIN — HYDRALAZINE HYDROCHLORIDE 25 MG: 25 TABLET, FILM COATED ORAL at 06:02

## 2023-04-06 RX ADMIN — HYDROCODONE BITARTRATE AND ACETAMINOPHEN 1 TABLET: 7.5; 325 TABLET ORAL at 12:09

## 2023-04-06 RX ADMIN — BUDESONIDE AND FORMOTEROL FUMARATE DIHYDRATE 2 PUFF: 160; 4.5 AEROSOL RESPIRATORY (INHALATION) at 07:15

## 2023-04-06 RX ADMIN — GABAPENTIN 600 MG: 300 CAPSULE ORAL at 21:05

## 2023-04-06 RX ADMIN — MONTELUKAST SODIUM 10 MG: 10 TABLET, FILM COATED ORAL at 21:05

## 2023-04-06 NOTE — PROGRESS NOTES
"    DAILY PROGRESS NOTE  [facility]    Patient Identification:  Name: Helena Carmen  Age: 89 y.o.  Sex: female  :  1931  MRN: [id number]         Primary Care Physician: Mariah Hickman MD    Subjective:  Interval History: Feeling and breathing much better.  Denies any current chest pain.  Still has some residual shortness of breath but denies any emesis.  Admits to chronic issues with nausea.  Denies any fever or any confusion    Objective: Legally blind.  Pleasant conversational and certainly nontoxic.  No family at bedside    Scheduled Meds:budesonide-formoterol, 2 puff, Inhalation, BID - RT  carvedilol, 12.5 mg, Oral, BID With Meals  famotidine, 40 mg, Oral, Nightly  furosemide, 40 mg, Intravenous, Q12H  gabapentin, 600 mg, Oral, Q12H  heparin (porcine), 5,000 Units, Subcutaneous, Q12H  hydrALAZINE, 25 mg, Oral, Q8H  HYDROcodone-acetaminophen, 1 tablet, Oral, Q6H  insulin glargine, 20 Units, Subcutaneous, Nightly  insulin lispro, 0-9 Units, Subcutaneous, TID AC  levothyroxine, 50 mcg, Oral, Q AM  montelukast, 10 mg, Oral, Nightly  pantoprazole, 40 mg, Oral, Q AM  sennosides-docusate, 2 tablet, Oral, Nightly  sodium chloride, 10 mL, Intravenous, Q12H      Continuous Infusions:     Vital signs in last 24 hours:  Temp:  [97.3 °F (36.3 °C)-98.1 °F (36.7 °C)] 97.9 °F (36.6 °C)  Heart Rate:  [57-74] 59  Resp:  [16-18] 18  BP: (100-174)/(57-83) 141/57    Intake/Output:    Intake/Output Summary (Last 24 hours) at 2023 0910  Last data filed at 2023 2340  Gross per 24 hour   Intake 480 ml   Output 2800 ml   Net -2320 ml       Exam:  /57 (BP Location: Left arm, Patient Position: Lying)   Pulse 59   Temp 97.9 °F (36.6 °C) (Oral)   Resp 18   Ht 144 cm (56.69\")   Wt 77 kg (169 lb 12.1 oz)   SpO2 92%   BMI 37.13 kg/m²     General Appearance:    Alert, cooperative, AAOx3                         Throat:   Oral mucosa pink and moist                           Neck:   No JVD          "                Lungs:    Clear to auscultation bilaterally, respirations unlabored                          Heart:    Irregular rate and rhythm, S1 and S2 normal                  Abdomen:     Soft, nontender, bowel sounds active                 Extremities: No pitting edema                        Pulses:   Pulses palpable in lower extremities                               Data Review:  Labs in chart were reviewed.    Assessment:  Active Hospital Problems    Diagnosis  POA   • **Acute on chronic combined systolic and diastolic congestive heart failure [I50.43]  Yes   • Chronic midline low back pain with bilateral sciatica [M54.41, M54.42, G89.29]  Yes   • Anemia, chronic disease [D63.8]  Yes   • Other cirrhosis of liver [K74.69]  Yes   • Chronic heart failure with preserved ejection fraction [I50.32]  Yes   • Hypothyroidism [E03.9]  Yes   • Chronic pain disorder [G89.4]  Yes   • Malignant neoplasm of uterus [C55]  Yes   • Macular degeneration [H35.30]  Yes   • Type 2 diabetes mellitus with hyperglycemia [E11.65]  Yes   • Pulmonary hypertension [I27.20]  Yes   • Paroxysmal atrial fibrillation [I48.0]  Yes   • Essential hypertension [I10]  Yes   • Stage 4 chronic kidney disease [N18.4]  Yes   • Anxiety [F41.9]  Yes      Resolved Hospital Problems   No resolved problems to display.       Plan:    Clinically improving with diuresis    Creatinine starting to elevate up to 1.6 with a uric acid of 9.4 -cardiology assisting with diuretics and I think the patient stable to transition to p.o. in very near future    The main question is in regards to questionable A-fib and will need final decision from cardiology if felt present as telemetry is abnormal    Echocardiogram demonstrate 50% EF with bioprosthetic aortic valve severe pulmonary hypertension    DM2 with ocular involvement/macular degeneration and secondary blindness compounded by CKD/hyperglycemia/peripheral neuropathy on gabapentin              -Currently on basal  bolus regimen with Lantus 20u/SSI              -No p.o. on MAR              -A1c 5.3 -over control will lead to increased risk of falls for this 89-year-old.  I personally would be fine if her A1c was in the 8 range and would avoid over control of sugars   -BS stable      Morbid obesity with a BMI of 37+ complicates all the above     HTN stable -continue Norvasc/hydralazine -dual vasodilators contributory to edema as well                Anemia of chronic disease with improved thrombocytopenia status post IVIG per hematology prior to admission              -9.6/134 respectively     Known L5 VCF/rib fractures     GERD -chronically on Protonix in the a.m.  States still uncontrolled so will trial Pepcid nightly and use Tums/Pepto as needed        Disposition -approaching discharge either later today versus tomorrow.  Clinically improving regarding dyspnea and overall fluid balance and awaiting further cardiac recommendations regarding diuretics and possible new onset A-fib?    Brian Bella MD  4/6/2023  09:10 EDT

## 2023-04-06 NOTE — PLAN OF CARE
Goal Outcome Evaluation:     Patient A&Ox4. Medicated per orders. Turned as needed/would allow. Able to ambulate with x1 assistance to bedside commode. No acute distress noted.

## 2023-04-06 NOTE — PROGRESS NOTES
[facility]  Cardiology Progress Note    Patient Identification:  Name: Helena Carmen  Age: 89 y.o.  Sex: female  :  1931  MRN: [id number]                 Follow Up / Chief Complaint: Follow-up for shortness of breath.    Interval History: She denies chest pain or shortness of breath.  She states she is feeling much better.       Objective:    Past Medical History:  Past Medical History:   Diagnosis Date    Aortic valve stenosis     s/p tissue AVR    Back pain     CKD (chronic kidney disease)     Colitis due to Clostridioides difficile 2022    Diastolic dysfunction     Grade 2 per echocardiogram     Diverticulosis     Exertional shortness of breath     Heart disease     Hiatal hernia     Hyperlipidemia     Hypertension     Hyperthyroidism     Hypertriglyceridemia 2018    Hypothyroidism     L5 compression fracture (HCC) 2022    Left ventricular hypertrophy     Legally blind     Liver disease     Low back pain     Macular degeneration     Mitral regurgitation     Osteoarthritis of hip     Osteoporosis     Pancreatitis 2022    Paroxysmal atrial fibrillation     Peripheral neuropathy     Premature ventricular contractions     Pulmonary hypertension     Renal insufficiency syndrome     Type 2 diabetes mellitus     Uterine cancer      Past Surgical History:  Past Surgical History:   Procedure Laterality Date    AORTIC VALVE REPAIR/REPLACEMENT      CATARACT EXTRACTION      ,     CHOLECYSTECTOMY      ENDOSCOPY  08/15/2014    no gross lesions in stomach/duodenum, erythrematous mucosa in stomach    EPIDURAL BLOCK      HYSTERECTOMY  2007    STERNOTOMY          Social History:   Social History     Tobacco Use    Smoking status: Former     Packs/day: 0.50     Types: Cigarettes     Quit date: 7/10/1969     Years since quittin.7    Smokeless tobacco: Never   Substance Use Topics    Alcohol use: No     Comment: caffeine use - coffee 2 cups daily      Family  History:  Family History   Problem Relation Age of Onset    Heart disease Mother     Hypertension Mother     Stroke Mother     Diabetes Mother         mellitus    Other Other         cardiovascular disorder          Allergies:  Allergies   Allergen Reactions    Baclofen Unknown - Low Severity     Yeast infection    Erythromycin Unknown (See Comments) and Other (See Comments)     Pt states she does not remember but it was many years ago  Pt states she does not remember but it was many years ago    Statins Myalgia    Cephalexin Other (See Comments) and Unknown (See Comments)     June 2022 Pt has tolerated ceftriaxone and cefepime during admission.   Pt states she does not remember reaction but it was many years ago    Penicillins Rash    Sulfa Antibiotics Itching and Rash     Scheduled Meds:  budesonide-formoterol, 2 puff, BID - RT  carvedilol, 12.5 mg, BID With Meals  famotidine, 40 mg, Nightly  furosemide, 40 mg, Q12H  gabapentin, 600 mg, Q12H  heparin (porcine), 5,000 Units, Q12H  hydrALAZINE, 25 mg, Q8H  HYDROcodone-acetaminophen, 1 tablet, Q6H  insulin glargine, 20 Units, Nightly  insulin lispro, 0-9 Units, TID AC  levothyroxine, 50 mcg, Q AM  montelukast, 10 mg, Nightly  pantoprazole, 40 mg, Q AM  sennosides-docusate, 2 tablet, Nightly  sodium chloride, 10 mL, Q12H            INTAKE AND OUTPUT:    Intake/Output Summary (Last 24 hours) at 4/6/2023 1029  Last data filed at 4/5/2023 2340  Gross per 24 hour   Intake 480 ml   Output 2150 ml   Net -1670 ml       Review of Systems:    GI: Denies nausea and vomiting  Cardiac: Denies chest pain  Pulmonary: Denies shortness of breath    Constitutional:  Temp:  [97.3 °F (36.3 °C)-98.1 °F (36.7 °C)] 97.9 °F (36.6 °C)  Heart Rate:  [57-74] 59  Resp:  [16-18] 18  BP: (100-174)/(57-83) 141/57    Physical Exam:  General:  Appears in no acute distress, resting in bed  Eyes: eom normal no conjunctival drainage  HEENT:  No JVD. Thyroid not visibly enlarged. No mucosal pallor or  cyanosis  Respiratory: Respirations regular and unlabored at rest. BBS with good air entry in all fields. No crackles, rubs or wheezes auscultated  Cardiovascular: S1S2 Regular rate and rhythm. No murmur, rub or gallop. No carotid bruits. DP/PT pulses     . No pretibial pitting edema  Gastrointestinal: Abdomen soft, flat, non tender. Bowel sounds present. No hepatosplenomegaly. No ascites  Skin:   Skin warm and dry to touch. No rashes    Neuro: AAO x3 CN II-XII grossly intact  Psych: Mood and affect normal, pleasant and cooperative          I reviewed the patient's new clinical results, and personally reviewed and interpreted the patient's ECG and telemetry data from the last 24 hours      Cardiographics       ECG:   SR with PVC's    Echocardiogram:   Interpretation Summary         Left ventricular systolic function is normal. Estimated left ventricular EF = 50%    Left atrial volume is moderately increased.    There is a bioprosthetic aortic valve present.    There is mild, bileaflet mitral valve thickening present.    Calculated right ventricular systolic pressure from tricuspid regurgitation is 75 mmHg.    Severe pulmonary hypertension is present.    The aortic valve peak and mean gradients are within defined limits.       Lab Review   Results from last 7 days   Lab Units 04/05/23  0642 04/05/23  0453 04/04/23  1125   HSTROP T ng/L 81* 98* 76*     Results from last 7 days   Lab Units 04/05/23  0453   MAGNESIUM mg/dL 1.9     Results from last 7 days   Lab Units 04/06/23  0549   SODIUM mmol/L 133*   POTASSIUM mmol/L 5.0   BUN mg/dL 35*   CREATININE mg/dL 1.69*   CALCIUM mg/dL 9.3        Results from last 7 days   Lab Units 04/05/23  0453 04/04/23  1125   WBC 10*3/mm3 5.47 8.20   HEMOGLOBIN g/dL 9.6* 9.9*   HEMATOCRIT % 32.6* 32.5*   PLATELETS 10*3/mm3 134* 144       Intake/Output Summary (Last 24 hours) at 4/6/2023 1056  Last data filed at 4/5/2023 2340  Gross per 24 hour   Intake 480 ml   Output 2150 ml   Net  "-1670 ml                 Assessment:  1.  Aortic stenosis: 2013 she had 21 mm [place] trifecta bovine pericardial valve placed.    2.  LV insufficiency: Severe MAC.  Conservative management.  3.  Acute on chronic diastolic congestive heart failure due to increase sodium intake and echo showed severe pulmonary hypertension.  No BB due to bradycardia  4.  Pulmonary hypertension: PA 37 significantly decreased compared to 2019 at 69.1..    5.  PVCs: Chronic: Monitor shows sinus rhythm with PVCs. no atrial fib.  6.  Hypertension: Hydralazine added yesterday, will have nurse to manuals.  7.  CKD 3B, creatinine 1.69, monitor  8.  Thrombocytopenia: Platelets 134 yesterday labs.  9.  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 Bella, nurse and daughter  Switch Lasix to p.o. Creatinine slightly elevated from admission. Bmp in am.     )4/6/2023  RIGO Zelaya/Transcription:   \"Dictated utilizing Dragon dictation\".     "

## 2023-04-07 ENCOUNTER — TELEPHONE (OUTPATIENT)
Dept: ONCOLOGY | Facility: CLINIC | Age: 88
End: 2023-04-07
Payer: MEDICARE

## 2023-04-07 ENCOUNTER — READMISSION MANAGEMENT (OUTPATIENT)
Dept: CALL CENTER | Facility: HOSPITAL | Age: 88
End: 2023-04-07
Payer: MEDICARE

## 2023-04-07 ENCOUNTER — NURSE TRIAGE (OUTPATIENT)
Dept: CALL CENTER | Facility: HOSPITAL | Age: 88
End: 2023-04-07
Payer: MEDICARE

## 2023-04-07 ENCOUNTER — TELEPHONE (OUTPATIENT)
Dept: CARDIOLOGY | Facility: CLINIC | Age: 88
End: 2023-04-07
Payer: MEDICARE

## 2023-04-07 VITALS
BODY MASS INDEX: 36.1 KG/M2 | DIASTOLIC BLOOD PRESSURE: 59 MMHG | TEMPERATURE: 97.9 F | HEART RATE: 60 BPM | WEIGHT: 167.33 LBS | OXYGEN SATURATION: 97 % | SYSTOLIC BLOOD PRESSURE: 138 MMHG | RESPIRATION RATE: 18 BRPM | HEIGHT: 57 IN

## 2023-04-07 LAB
ANION GAP SERPL CALCULATED.3IONS-SCNC: 12.8 MMOL/L (ref 5–15)
BUN SERPL-MCNC: 43 MG/DL (ref 8–23)
BUN/CREAT SERPL: 30.7 (ref 7–25)
CALCIUM SPEC-SCNC: 8.9 MG/DL (ref 8.2–9.6)
CHLORIDE SERPL-SCNC: 97 MMOL/L (ref 98–107)
CO2 SERPL-SCNC: 22.2 MMOL/L (ref 22–29)
CREAT SERPL-MCNC: 1.4 MG/DL (ref 0.57–1)
EGFRCR SERPLBLD CKD-EPI 2021: 35.4 ML/MIN/1.73
GLUCOSE BLDC GLUCOMTR-MCNC: 143 MG/DL (ref 70–130)
GLUCOSE BLDC GLUCOMTR-MCNC: 270 MG/DL (ref 70–130)
GLUCOSE SERPL-MCNC: 166 MG/DL (ref 65–99)
POTASSIUM SERPL-SCNC: 4.2 MMOL/L (ref 3.5–5.2)
SODIUM SERPL-SCNC: 132 MMOL/L (ref 136–145)

## 2023-04-07 PROCEDURE — 94799 UNLISTED PULMONARY SVC/PX: CPT

## 2023-04-07 PROCEDURE — 82962 GLUCOSE BLOOD TEST: CPT

## 2023-04-07 PROCEDURE — 94761 N-INVAS EAR/PLS OXIMETRY MLT: CPT

## 2023-04-07 PROCEDURE — 80048 BASIC METABOLIC PNL TOTAL CA: CPT | Performed by: NURSE PRACTITIONER

## 2023-04-07 PROCEDURE — 94664 DEMO&/EVAL PT USE INHALER: CPT

## 2023-04-07 PROCEDURE — 63710000001 INSULIN LISPRO (HUMAN) PER 5 UNITS: Performed by: INTERNAL MEDICINE

## 2023-04-07 PROCEDURE — 97530 THERAPEUTIC ACTIVITIES: CPT

## 2023-04-07 PROCEDURE — 94760 N-INVAS EAR/PLS OXIMETRY 1: CPT

## 2023-04-07 PROCEDURE — 25010000002 HEPARIN (PORCINE) PER 1000 UNITS: Performed by: INTERNAL MEDICINE

## 2023-04-07 RX ORDER — FAMOTIDINE 40 MG/1
40 TABLET, FILM COATED ORAL NIGHTLY
Qty: 30 TABLET | Refills: 0 | Status: SHIPPED | OUTPATIENT
Start: 2023-04-07

## 2023-04-07 RX ORDER — AMOXICILLIN 250 MG
2 CAPSULE ORAL NIGHTLY
Start: 2023-04-07 | End: 2023-04-18 | Stop reason: ALTCHOICE

## 2023-04-07 RX ORDER — CALCIUM CARBONATE 200(500)MG
2 TABLET,CHEWABLE ORAL 3 TIMES DAILY PRN
Start: 2023-04-07

## 2023-04-07 RX ADMIN — LEVOTHYROXINE SODIUM 50 MCG: 0.05 TABLET ORAL at 06:14

## 2023-04-07 RX ADMIN — Medication 10 ML: at 08:28

## 2023-04-07 RX ADMIN — HYDRALAZINE HYDROCHLORIDE 25 MG: 25 TABLET, FILM COATED ORAL at 06:14

## 2023-04-07 RX ADMIN — CARVEDILOL 12.5 MG: 12.5 TABLET, FILM COATED ORAL at 08:27

## 2023-04-07 RX ADMIN — HYDROCODONE BITARTRATE AND ACETAMINOPHEN 1 TABLET: 7.5; 325 TABLET ORAL at 11:58

## 2023-04-07 RX ADMIN — FUROSEMIDE 40 MG: 40 TABLET ORAL at 08:27

## 2023-04-07 RX ADMIN — BUDESONIDE AND FORMOTEROL FUMARATE DIHYDRATE 2 PUFF: 160; 4.5 AEROSOL RESPIRATORY (INHALATION) at 07:24

## 2023-04-07 RX ADMIN — GABAPENTIN 600 MG: 300 CAPSULE ORAL at 08:27

## 2023-04-07 RX ADMIN — HEPARIN SODIUM 5000 UNITS: 5000 INJECTION INTRAVENOUS; SUBCUTANEOUS at 08:28

## 2023-04-07 RX ADMIN — HYDROCODONE BITARTRATE AND ACETAMINOPHEN 1 TABLET: 7.5; 325 TABLET ORAL at 06:14

## 2023-04-07 RX ADMIN — PANTOPRAZOLE SODIUM 40 MG: 40 TABLET, DELAYED RELEASE ORAL at 06:14

## 2023-04-07 RX ADMIN — INSULIN LISPRO 6 UNITS: 100 INJECTION, SOLUTION INTRAVENOUS; SUBCUTANEOUS at 11:57

## 2023-04-07 RX ADMIN — HYDROCODONE BITARTRATE AND ACETAMINOPHEN 1 TABLET: 7.5; 325 TABLET ORAL at 00:19

## 2023-04-07 NOTE — CASE MANAGEMENT/SOCIAL WORK
Case Management Discharge Note      Final Note: Pt discharged home with family and [facility] with [facility] to contact for eval at home.  RITA Ashford RN         Selected Continued Care - Discharged on 4/7/2023 Admission date: 4/4/2023 - Discharge disposition: Home or Self Care    Destination    No services have been selected for the patient.              Durable Medical Equipment    No services have been selected for the patient.              Dialysis/Infusion    No services have been selected for the patient.              Home Medical Care     Service Provider Selected Services Address Phone Fax Patient Preferred    [facility] [address], [address], [place] [address] [phone] --          Therapy    No services have been selected for the patient.              Community Resources    No services have been selected for the patient.              Community & DME    No services have been selected for the patient.                Selected Continued Care - Prior Encounters Includes continued care and service providers with selected services from prior encounters from 1/4/2023 to 4/7/2023    Discharged on 3/14/2023 Admission date: 3/7/2023 - Discharge disposition: Skilled Nursing Facility (DC - External)    Destination     Service Provider Selected Services Address Phone Fax Patient Preferred    [facility] [address] [address] [phone] --          Home Medical Care     Service Provider Selected Services Address Phone Fax Patient Preferred    [facility] [address], [address], [place] [address] [phone] --                    Transportation Services  Private: Car    Final Discharge Disposition Code: 06 - home with home health care

## 2023-04-07 NOTE — TELEPHONE ENCOUNTER
I called the patient's daughter. See other telephone encounter.    Enid Walls RN  [facility] Triage  04/07/23 16:28 EDT

## 2023-04-07 NOTE — TELEPHONE ENCOUNTER
Call back to Radha to let her know Dr. Sherman had responded to message, and he wants her to continue Prednisone 10 mg daily, not 5 mg daily.  Radha agrees that it is 10 mg instead of 5 mg daily.  She will continue the mediation.

## 2023-04-07 NOTE — TELEPHONE ENCOUNTER
Patient daughter called and stated that the patient just got out of the hospital and they DC'd the Norvasc and she is very concerned about her not taking it.  She feels as though it will impact her CHF negatively  She wanted to know what you think about it.  Please advise.    This is what was said in the DC from the hospital: Her hypertension has been stable and she is maintained on hydralazine that we discontinued Norvasc as dual vasodilators may be contributory to her edematous change. With the removal of Norvasc her blood pressures remain therapeutic with last 1 noted to be 138/59.     CB: [phone] or [phone]    Kerry

## 2023-04-07 NOTE — THERAPY TREATMENT NOTE
Patient Name: Helena Carmen  : 1931    MRN: [id number]                              Today's Date: 2023       Admit Date: 2023    Visit Dx:     ICD-10-CM ICD-9-CM   1. Acute on chronic combined systolic and diastolic congestive heart failure  I50.43 428.43     428.0   2. History of coronary artery disease  Z86.79 V12.59   3. Chronic renal impairment, unspecified CKD stage  N18.9 585.9   4. Dyspnea, unspecified type  R06.00 786.09   5. Chronic anemia  D64.9 285.9   6. Elevated troponin  R77.8 790.6   7. Elevated brain natriuretic peptide (BNP) level  R79.89 790.99     Patient Active Problem List   Diagnosis   • Aortic valve stenosis   • Fatigue   • MI (mitral incompetence)   • PVC (premature ventricular contraction)   • Legally blind   • Essential hypertension   • Hypertriglyceridemia   • Pulmonary hypertension   • Paroxysmal atrial fibrillation   • Anxiety   • Stage 4 chronic kidney disease   • Chronic pain disorder   • Degeneration of lumbar intervertebral disc   • Type 2 diabetes mellitus with hyperglycemia   • Esophageal reflux   • Gastroparesis   • Hiatal hernia   • Iatrogenic hypothyroidism   • Macular degeneration   • Malignant neoplasm of uterus   • Osteoarthritis of knee   • Peripheral nerve disease   • Secondary hyperparathyroidism   • Thrombocytopenia   • Chronic heart failure with preserved ejection fraction   • Other cirrhosis of liver   • Hypothyroidism   • Nonrheumatic tricuspid valve regurgitation   • Anemia, chronic disease   • Hyponatremia   • Closed fracture of fifth lumbar vertebra   • Closed fracture of fifth lumbar vertebra, unspecified fracture morphology, initial encounter   • Diabetic polyneuropathy associated with type 2 diabetes mellitus   • Chronic ITP (idiopathic thrombocytopenia)   • Chronic midline low back pain with bilateral sciatica   • Acute deep vein thrombosis of calf, bilateral   • Acute left-sided low back pain with left-sided sciatica   • Urine retention   • Rib  fracture   • Acute on chronic combined systolic and diastolic congestive heart failure     Past Medical History:   Diagnosis Date   • Aortic valve stenosis     s/p tissue AVR   • Back pain    • CKD (chronic kidney disease)    • Colitis due to Clostridioides difficile 01/26/2022   • Diastolic dysfunction     Grade 2 per echocardiogram 2013   • Diverticulosis    • Exertional shortness of breath    • Heart disease    • Hiatal hernia    • Hyperlipidemia    • Hypertension    • Hyperthyroidism    • Hypertriglyceridemia 05/31/2018   • Hypothyroidism    • L5 compression fracture (HCC) 08/2022   • Left ventricular hypertrophy    • Legally blind    • Liver disease    • Low back pain    • Macular degeneration    • Mitral regurgitation    • Osteoarthritis of hip    • Osteoporosis    • Pancreatitis 01/26/2022   • Paroxysmal atrial fibrillation    • Peripheral neuropathy    • Premature ventricular contractions    • Pulmonary hypertension    • Renal insufficiency syndrome    • Type 2 diabetes mellitus    • Uterine cancer      Past Surgical History:   Procedure Laterality Date   • AORTIC VALVE REPAIR/REPLACEMENT     • CATARACT EXTRACTION      1970, 1999   • CHOLECYSTECTOMY     • ENDOSCOPY  08/15/2014    no gross lesions in stomach/duodenum, erythrematous mucosa in stomach   • EPIDURAL BLOCK     • HYSTERECTOMY  2007   • STERNOTOMY        General Information     Row Name 04/07/23 1243          Physical Therapy Time and Intention    Document Type therapy note (daily note)  -DJ     Mode of Treatment individual therapy;physical therapy  -DJ     Row Name 04/07/23 1243          General Information    Patient Profile Reviewed yes  -DJ     Existing Precautions/Restrictions fall  -DJ     Row Name 04/07/23 1243          Cognition    Orientation Status (Cognition) oriented x 3  -DJ     Row Name 04/07/23 1243          Safety Issues, Functional Mobility    Comment, Safety Issues/Impairments (Mobility) gt belt, nonskid socks  -DJ           User  Key  (r) = Recorded By, (t) = Taken By, (c) = Cosigned By    Initials Name Provider Type    Sherrie Krishnamurthy PT Physical Therapist               Mobility     Row Name 04/07/23 1245          Bed Mobility    Bed Mobility supine-sit;sit-supine  -DJ     Supine-Sit Bartholomew (Bed Mobility) verbal cues;contact guard  -DJ     Sit-Supine Bartholomew (Bed Mobility) contact guard;verbal cues  -DJ     Assistive Device (Bed Mobility) bed rails;head of bed elevated  -DJ     Comment, (Bed Mobility) moves slow  -DJ     Row Name 04/07/23 1245          Transfers    Comment, (Transfers) sit/stand from EOB  -DJ     Row Name 04/07/23 1245          Bed-Chair Transfer    Bed-Chair Bartholomew (Transfers) not tested  -DJ     Row Name 04/07/23 1245          Sit-Stand Transfer    Sit-Stand Bartholomew (Transfers) contact guard;verbal cues  -DJ     Assistive Device (Sit-Stand Transfers) walker, front-wheeled  -DJ     Row Name 04/07/23 1245          Gait/Stairs (Locomotion)    Bartholomew Level (Gait) contact guard  -DJ     Assistive Device (Gait) walker, front-wheeled  -DJ     Distance in Feet (Gait) 30'  -DJ     Deviations/Abnormal Patterns (Gait) paul decreased;gait speed decreased;stride length decreased  -DJ     Bilateral Gait Deviations forward flexed posture  -DJ     Bartholomew Level (Stairs) not tested  -DJ     Comment, (Gait/Stairs) Pt amb 30' with r wx and CGA - slow pace, sig flexed posture (nearly 90 degrees trunk flex), shuffled steps. Decreased endurance limits further amb distance.  -DJ           User Key  (r) = Recorded By, (t) = Taken By, (c) = Cosigned By    Initials Name Provider Type    Sherrie Krishnamurthy PT Physical Therapist               Obj/Interventions     Row Name 04/07/23 1251          Motor Skills    Motor Skills functional endurance  -DJ     Functional Endurance poor  -DJ     Row Name 04/07/23 1251          Balance    Balance Assessment standing static balance;standing dynamic balance  -DJ     Static  Standing Balance contact guard;verbal cues  -DJ     Dynamic Standing Balance contact guard;verbal cues  -DJ     Position/Device Used, Standing Balance supported;walker, front-wheeled  -DJ     Balance Interventions sitting;standing;sit to stand;supported;weight shifting activity  -DJ     Comment, Balance unsteady but 0 LOB  -DJ           User Key  (r) = Recorded By, (t) = Taken By, (c) = Cosigned By    Initials Name Provider Type    Sherrie Krishnamurthy, PT Physical Therapist               Goals/Plan    No documentation.                Clinical Impression     Row Name 04/07/23 1254          Pain    Pre/Posttreatment Pain Comment PT with c/o all over pain bacilio low back discomfort  -DJ     Pain Intervention(s) Repositioned;Rest  -DJ     Row Name 04/07/23 1254          Plan of Care Review    Plan of Care Reviewed With patient  -DJ     Progress improving  -DJ     Outcome Evaluation Pt resting in bed with c/o discomfort in mult sites, bacilio LBP. Pt recently received meds from nsg and pt agreeable to PT services. Pt req CGA to sit EOB and to stand with r wx. Pt amb 30' with r wx and CGA - slow pace, sig flexed posture (nearly 90 degrees trunk flex), shuffled steps. Decreased endurance limits further amb distance. Pt returned to bed and req CGA to return supine, dependent to reposition. Overall, pt mobility limited by pain, decreased endurance, adn postural impairments. Pt safe to return home as she has 24 hour caregiver assistance and supportive family.  -DJ     Row Name 04/07/23 1254          Vital Signs    Pre Patient Position Supine  -DJ     Intra Patient Position Standing  -DJ     Post Patient Position Supine  -DJ     Row Name 04/07/23 1254          Positioning and Restraints    Pre-Treatment Position in bed  -DJ     Post Treatment Position bed  -DJ     In Bed notified nsg;supine;call light within reach;encouraged to call for assist;with family/caregiver;SCD pump applied  -DJ           User Key  (r) = Recorded By, (t) = Taken  By, (c) = Cosigned By    Initials Name Provider Type    Sherrie Krishnamurthy, PT Physical Therapist               Outcome Measures     Row Name 04/07/23 1259          How much help from another person do you currently need...    Turning from your back to your side while in flat bed without using bedrails? 3  -DJ     Moving from lying on back to sitting on the side of a flat bed without bedrails? 3  -DJ     Moving to and from a bed to a chair (including a wheelchair)? 3  -DJ     Standing up from a chair using your arms (e.g., wheelchair, bedside chair)? 3  -DJ     Climbing 3-5 steps with a railing? 1  -DJ     To walk in hospital room? 2  -DJ     AM-PAC 6 Clicks Score (PT) 15  -DJ     Highest level of mobility 4 --> Transferred to chair/commode  -DJ     Row Name 04/07/23 1259          Functional Assessment    Outcome Measure Options AM-PAC 6 Clicks Basic Mobility (PT)  -DJ           User Key  (r) = Recorded By, (t) = Taken By, (c) = Cosigned By    Initials Name Provider Type    Sherrie Krishnamurthy, PT Physical Therapist                             Physical Therapy Education     Title: PT OT SLP Therapies (Done)     Topic: Physical Therapy (Done)     Point: Mobility training (Done)     Learning Progress Summary           Patient Acceptance, E, VU by MILA at 4/7/2023 1300    Acceptance, E, VU by LAURIE at 4/5/2023 1409                   Point: Home exercise program (Done)     Learning Progress Summary           Patient Acceptance, E, VU by EL at 4/5/2023 1409                   Point: Body mechanics (Done)     Learning Progress Summary           Patient Acceptance, E, VU by MILA at 4/7/2023 1300    Acceptance, E, VU by EL at 4/5/2023 1409                   Point: Precautions (Done)     Learning Progress Summary           Patient Acceptance, E, VU by MILA at 4/7/2023 1300    Acceptance, E, VU by EL at 4/5/2023 1409                               User Key     Initials Effective Dates Name Provider Type On license of [facility]    MILA 10/25/19 -  Omid  CANDY Balderas Physical Therapist PT    EL 11/16/21 -  Teresa Hoyos PT Physical Therapist PT              PT Recommendation and Plan     Plan of Care Reviewed With: patient  Progress: improving  Outcome Evaluation: Pt resting in bed with c/o discomfort in mult sites, bacilio LBP. Pt recently received meds from [facility] and pt agreeable to PT services. Pt req CGA to sit EOB and to stand with r wx. Pt amb 30' with r wx and CGA - slow pace, sig flexed posture (nearly 90 degrees trunk flex), shuffled steps. Decreased endurance limits further amb distance. Pt returned to bed and req CGA to return supine, dependent to reposition. Overall, pt mobility limited by pain, decreased endurance, adn postural impairments. Pt safe to return home as she has 24 hour caregiver assistance and supportive family.     Time Calculation:    PT Charges     Row Name 04/07/23 1300             Time Calculation    Start Time 0952  -DJ      Stop Time 1008  -DJ      Time Calculation (min) 16 min  -DJ      PT Non-Billable Time (min) 10 min  -DJ      PT Received On 04/07/23  -DJ      PT - Next Appointment 04/10/23  -DJ            User Key  (r) = Recorded By, (t) = Taken By, (c) = Cosigned By    Initials Name Provider Type    DJ Sherrie Anne, CANDY Physical Therapist              Therapy Charges for Today     Code Description Service Date Service Provider Modifiers Qty    55896053837 HC PT THERAPEUTIC ACT EA 15 MIN 4/7/2023 Sherrie Anne PT GP 1          PT G-Codes  Outcome Measure Options: AM-PAC 6 Clicks Basic Mobility (PT)  AM-PAC 6 Clicks Score (PT): 15  AM-PAC 6 Clicks Score (OT): 12       Sherrie Anne PT  4/7/2023

## 2023-04-07 NOTE — DISCHARGE SUMMARY
[facility]    Date of Discharge:  4/7/2023    PCP: Mariah Hickman MD    Discharge Diagnosis:   Active Hospital Problems    Diagnosis  POA   • **Acute on chronic combined systolic and diastolic congestive heart failure [I50.43]  Yes   • Chronic midline low back pain with bilateral sciatica [M54.41, M54.42, G89.29]  Yes   • Anemia, chronic disease [D63.8]  Yes   • Other cirrhosis of liver [K74.69]  Yes   • Chronic heart failure with preserved ejection fraction [I50.32]  Yes   • Hypothyroidism [E03.9]  Yes   • Chronic pain disorder [G89.4]  Yes   • Malignant neoplasm of uterus [C55]  Yes   • Macular degeneration [H35.30]  Yes   • Type 2 diabetes mellitus with hyperglycemia [E11.65]  Yes   • Pulmonary hypertension [I27.20]  Yes   • Paroxysmal atrial fibrillation [I48.0]  Yes   • Essential hypertension [I10]  Yes   • Stage 4 chronic kidney disease [N18.4]  Yes   • Anxiety [F41.9]  Yes      Resolved Hospital Problems   No resolved problems to display.          Consults     Date and Time Order Name Status Description    4/4/2023  1:17 PM [facility] (on-call MD unless specified) Details      4/4/2023 12:51 PM [facility] (on-call MD unless specified) Completed     3/10/2023 10:44 AM Inpatient Urology Consult Completed     3/7/2023  2:35 PM Neurosurgery (on-call MD unless specified) Completed         Hospital Course  89 y.o. female initially admitted for complaints of shortness of breath.  See HPI for further clarification of admission diagnoses.  Basically patient was found to be in acute on chronic biventricular CHF compounded by severe hypertension further complicated by chronic kidney disease.  Anticipate she probably had a couple salty treats prior to admission that further led to some fluid retention.  She is 89 years of age so I do not think she needs to follow the strictest of dietary restrictions.  She responded promptly to IV Lasix and her creatinine and uric acid  levels have tolerated.  By discharge her creatinine is 1.4 with a uric of 9.4.  She clinically is much improved and states her previous shortness of breath is resolved and she is 98% on room air.  See echo for further clarification of results.  Her past history is a complicated by the DM2 with ocular involvement and macular degeneration as well as CKD peripheral neuropathy and hyperglycemia.  She can remain on her basal bolus regimen post discharge but I would avoid over control for this patient with given her advanced age and blindness as well as only having an A1c of 5.3 but keeping her on that basal bolus regimen has demonstrated therapeutic blood glucose levels for the most part.  Morbid obesity with a BMI of 37+ complicates all the above but I do not anticipate weight loss and this elderly patient.  Her hypertension has been stable and she is maintained on hydralazine that we discontinued Norvasc as dual vasodilators may be contributory to her edematous change.  With the removal of Norvasc her blood pressures remain therapeutic with last 1 noted to be 138/59.  Patient has known anemia of chronic disease as well as recent thrombocytopenia status post IVIG prior to admission per hematologist Dr. Sherman.  She was stable from a hematological perspective on this admission.  She other has additional known L5 VCF as well as multiple previous rib fractures which further hinder her mobility.  She has been dealing with GERD for quite some time so she takes Protonix daily and we have given her some Pepcid to use nightly to see if it gives her additional relief.  She utilizes Pepto-Bismol as needed.  At this point in time, patient has been followed by PT OT and CCP is help coordinate discharge needs this patient will be discharged to home with her 24/7 caretakers as well as home health for nursing and med compliance as well as PT and OT.  Patient had some skin lesions as outlined in wound care note on 4/5/2023 which were  present prior to admission.  All questions answered to the patient she is very thankful for the care she received while here as well as amenable to the idea of discharge to home today.      Temp:  [97.9 °F (36.6 °C)-98.1 °F (36.7 °C)] 97.9 °F (36.6 °C)  Heart Rate:  [56-61] 60  Resp:  [16-18] 18  BP: (138-166)/(51-59) 138/59  Body mass index is 36.6 kg/m².    Physical Exam  Constitutional:       Comments: Nontoxic, pleasant conversational with fluent speech, clinically blind   HENT:      Head: Normocephalic.      Nose: Nose normal.      Mouth/Throat:      Mouth: Mucous membranes are moist.      Pharynx: Oropharynx is clear.   Cardiovascular:      Rate and Rhythm: Normal rate and regular rhythm.   Pulmonary:      Effort: Pulmonary effort is normal. No respiratory distress.      Breath sounds: Normal breath sounds.   Abdominal:      General: Bowel sounds are normal. There is no distension.      Palpations: Abdomen is soft.      Tenderness: There is no abdominal tenderness.   Musculoskeletal:         General: No swelling.   Skin:     General: Skin is warm and dry.   Neurological:      Mental Status: She is alert and oriented to person, place, and time. Mental status is at baseline.       Disposition: Home or Self Care       Discharge Medications      New Medications      Instructions Start Date   bismuth subsalicylate 262 MG/15ML suspension  Commonly known as: PEPTO BISMOL   30 mL, Oral, Every 6 Hours PRN      calcium carbonate 500 MG chewable tablet  Commonly known as: TUMS   2 tablets, Oral, 3 Times Daily PRN      famotidine 40 MG tablet  Commonly known as: PEPCID   40 mg, Oral, Nightly         Changes to Medications      Instructions Start Date   levothyroxine 50 MCG tablet  Commonly known as: SYNTHROID, LEVOTHROID  What changed: how much to take   50 mcg, Oral, Every Early Morning         Continue These Medications      Instructions Start Date   Advair -21 MCG/ACT inhaler  Generic drug:  fluticasone-salmeterol   2 puffs, Inhalation, 2 Times Daily - RT      carvedilol 12.5 MG tablet  Commonly known as: COREG   12.5 mg, Oral, 2 Times Daily      cetirizine 5 MG tablet  Commonly known as: zyrTEC   5 mg, Oral, Daily PRN      cholecalciferol 25 MCG (1000 UT) tablet  Commonly known as: VITAMIN D3   1,000 Units, Oral, Daily      furosemide 40 MG tablet  Commonly known as: LASIX   40 mg, Oral, Daily      gabapentin 600 MG tablet  Commonly known as: NEURONTIN   600 mg, Oral, 2 Times Daily      hydrALAZINE 25 MG tablet  Commonly known as: APRESOLINE   50 mg, Oral, 2 Times Daily      HYDROcodone-acetaminophen 7.5-325 MG per tablet  Commonly known as: NORCO   1 tablet, Oral, Every 6 Hours      insulin glargine 100 UNIT/ML injection  Commonly known as: LANTUS, SEMGLEE   20 Units, Subcutaneous, Nightly      insulin lispro 100 UNIT/ML injection  Commonly known as: ADMELOG   0-9 Units, Subcutaneous, 3 Times Daily Before Meals      lidocaine 5 %  Commonly known as: LIDODERM   1 patch, Transdermal, Every 24 Hours, Remove & Discard patch within 12 hours or as directed by MD      LORazepam 0.5 MG tablet  Commonly known as: ATIVAN   0.5 mg, Oral, Every 8 Hours PRN      montelukast 10 MG tablet  Commonly known as: SINGULAIR   1 tablet, Oral, Nightly      ondansetron 8 MG tablet  Commonly known as: ZOFRAN   8 mg, Oral, As Needed      pantoprazole 40 MG EC tablet  Commonly known as: PROTONIX   40 mg, Oral, Daily      polyethylene glycol 17 GM/SCOOP powder  Commonly known as: MIRALAX   17 g, Oral, Daily      sennosides-docusate 8.6-50 MG per tablet  Commonly known as: PERICOLACE   2 tablets, Oral, Nightly      tamsulosin 0.4 MG capsule 24 hr capsule  Commonly known as: FLOMAX   0.4 mg, Oral, Every Night at Bedtime         Stop These Medications    acetaminophen 325 MG tablet  Commonly known as: TYLENOL     amLODIPine 2.5 MG tablet  Commonly known as: NORVASC     CLARITIN PO     guaiFENesin 600 MG 12 hr tablet  Commonly known  as: MUCINEX     methocarbamol 750 MG tablet  Commonly known as: ROBAXIN     predniSONE 5 MG tablet  Commonly known as: DELTASONE             Additional Instructions for the Follow-ups that You Need to Schedule     Discharge Follow-up with PCP   As directed       Currently Documented PCP:    Mariah Hickman MD    PCP Phone Number:    [phone]     Follow Up Details: PCP 2 to 3 weeks.  Cardiology per the recommendations            Follow-up Information     Mariah Hickman MD .    Specialty: Family Medicine  Why: PCP 2 to 3 weeks.  Cardiology per the recommendations  Contact information:  [address] [address]  [phone]                        Future Appointments   Date Time Provider Department Center   4/18/2023  2:20 PM Beth Butcher MD [facility]   4/20/2023 10:15 AM Casey Erickson MD [facility]   5/23/2023  3:20 PM Pablo Sherman Jr., [facility]  [facility]  04/07/23    Discharge time spent greater than 30 minutes.

## 2023-04-07 NOTE — PLAN OF CARE
"Goal Outcome Evaluation:  Plan of Care Reviewed With: patient        Progress: improving  Outcome Evaluation: c/o shoulder pain which routine pain med \"helping\", VSS, sinus valentín on monitor, eyes closed at long interval, resting quietly in room, will continue to monitor  " Spinal Block    Patient location during procedure: OR  Start time: 6/16/2021 10:01 AM  Reason for block: at surgeon's request and primary anesthetic  Staffing  Performed: anesthesiologist   Anesthesiologist: Richa Almendarez MD  Preanesthetic Checklist  Completed: patient identified, IV checked, site marked, risks and benefits discussed, surgical consent, monitors and equipment checked, pre-op evaluation and timeout performed  Spinal Block  Patient position: sitting  Prep: ChloraPrep and site prepped and draped  Patient monitoring: frequent blood pressure checks, heart rate and continuous pulse ox  Approach: midline  Location: L3-4  Injection technique: single-shot  Needle  Needle type: pencil-tip   Needle gauge: 25 G  Needle length: 15 cm  Assessment  Sensory level: T4  Events: cerebrospinal fluid  Injection Assessment:  negative aspiration for heme, no paresthesia on injection and positive aspiration for clear CSF    Post-procedure:  adhesive bandage applied, pressure dressing applied, secured with tape, site cleaned and sterile dressing applied

## 2023-04-07 NOTE — TELEPHONE ENCOUNTER
Called and spoke with the patient and she gave verbal consent to talk to her grand daughter, Dasia.  I advised Dasia of the reason why the Amlodipine was Dc'd. Advised her to make sure to monitor BP and call if it is high.  Advised her to take the BP 1-2 hours after medication and keep a log.  Call and let us know if it becomes high.  Dasia repeated it back with understanding.      Kerry

## 2023-04-07 NOTE — TELEPHONE ENCOUNTER
Call to Radha, Ms. Carmen' daughter, as she had called and questioning why the Prednisone has been discontinued on discharge from the hospital.  Let her know the hematologist group was not consulted on the admission, and not sure if anyone has spoken to Dr. Sherman about the Prednisone or not. Radha states she is not going to stop the Prednisone, as she thinks they may have stopped it because her platelet count was good while she was in the hospital.  Let her know this will be reviewed by Dr. Sherman as soon as he is back in the office and we can get back with her.  She states that for now, she is going to continue giving Ms. Carmen the Prednisone 5 mg daily.

## 2023-04-07 NOTE — PLAN OF CARE
Goal Outcome Evaluation:  Plan of Care Reviewed With: patient        Progress: improving  Outcome Evaluation: Pt resting in bed with c/o discomfort in mult sites, bacilio LBP. Pt recently received meds from nsg and pt agreeable to PT services. Pt req CGA to sit EOB and to stand with r wx. Pt amb 30' with r wx and CGA - slow pace, sig flexed posture (nearly 90 degrees trunk flex), shuffled steps. Decreased endurance limits further amb distance. Pt returned to bed and req CGA to return supine, dependent to reposition. Overall, pt mobility limited by pain, decreased endurance, adn postural impairments. Pt safe to return home as she has 24 hour caregiver assistance and supportive family.

## 2023-04-07 NOTE — TELEPHONE ENCOUNTER
Triage nurse called Dr. Mazariegos office and left a VM with Dr. Butcher's medical assistance to call Daughter Radha back regarding inquiring about why mother was taken off of Norvasc, daughter concerned it will impact her mothers CHF negatively.   - Triage nurse left a message with Enid at Dr. Sherman's office regarding patients daughter Radha inquiring why mother was taken off of Prednisone, Enid said she will have the nurse return caller's phone call.     Reason for Disposition  • [1] Caller has NON-URGENT medicine question about med that PCP prescribed AND [2] triager unable to answer question    Additional Information  • Negative: [1] Intentional drug overdose AND [2] suicidal thoughts or ideas  • Negative: Drug overdose and triager unable to answer question  • Negative: Caller requesting information unrelated to medicine  • Negative: Caller requesting information about COVID-19 Vaccine  • Negative: Caller requesting information about Emergency Contraception  • Negative: Caller requesting information about Combined Birth Control Pills  • Negative: Caller requesting information about Progestin Birth Control Pills  • Negative: Caller requesting information about Post-Op pain or medicines  • Negative: Caller requesting a prescription antibiotic (such as Penicillin) for Strep throat and has a positive culture result  • Negative: Caller requesting a prescription anti-viral med (such as Tamiflu) and has influenza (flu)  symptoms  • Negative: Immunization reaction suspected  • Negative: Rash while taking a medicine or within 3 days of stopping it  • Negative: [1] Asthma and [2] having symptoms of asthma (cough, wheezing, etc.)  • Negative: [1] Symptom of illness (e.g., headache, abdominal pain, earache, vomiting) AND [2] more than mild  • Negative: Breastfeeding questions about mother's medicines and diet  • Negative: MORE THAN A DOUBLE DOSE of a prescription or over-the-counter (OTC) drug  • Negative: [1] DOUBLE  "DOSE (an extra dose or lesser amount) of prescription drug AND [2] any symptoms (e.g., dizziness, nausea, pain, sleepiness)  • Negative: [1] DOUBLE DOSE (an extra dose or lesser amount) of over-the-counter (OTC) drug AND [2] any symptoms (e.g., dizziness, nausea, pain, sleepiness)  • Negative: Took another person's prescription drug  • Negative: [1] DOUBLE DOSE (an extra dose or lesser amount) of prescription drug AND [2] NO symptoms (Exception: a double dose of antibiotics)  • Negative: Diabetes drug error or overdose (e.g., took wrong type of insulin or took extra dose)  • Negative: [1] Prescription refill request for ESSENTIAL medicine (i.e., likelihood of harm to patient if not taken) AND [2] triager unable to refill per department policy  • Negative: [1] Prescription not at pharmacy AND [2] was prescribed by PCP recently (Exception: triager has access to EMR and prescription is recorded there. Go to Home Care and confirm for pharmacy.)  • Negative: [1] Pharmacy calling with prescription question AND [2] triager unable to answer question  • Negative: [1] Caller has URGENT medicine question about med that PCP or specialist prescribed AND [2] triager unable to answer question  • Negative: Medicine patch causing local rash or itching  • Negative: [1] Caller has medicine question about med NOT prescribed by PCP AND [2] triager unable to answer question (e.g., compatibility with other med, storage)  • Negative: Prescription request for new medicine (not a refill)  • Negative: Prescription refill request for a CONTROLLED substance (e.g., narcotics, ADHD medicines)  • Negative: [1] Prescription refill request for NON-ESSENTIAL medicine (i.e., no harm to patient if med not taken) AND [2] triager unable to refill per department policy    Answer Assessment - Initial Assessment Questions  1. NAME of MEDICATION: \"What medicine are you calling about?\"      Norvasc and prednisone  2. QUESTION: \"What is your question?\" (e.g., " "medication refill, side effect)  Inquiring why mother was taken off of Norvasc and Prednisone   3. PRESCRIBING HCP: \"Who prescribed it?\" Reason: if prescribed by specialist, call should be referred to that group.      Dr. Butcher for Norvasc and Dr. Sherman for prednisone  4. SYMPTOMS: \"Do you have any symptoms?\"  NO  5. SEVERITY: If symptoms are present, ask \"Are they mild, moderate or severe?\"  Denies  6. PREGNANCY:  \"Is there any chance that you are pregnant?\" \"When was your last menstrual period?\"  NA    Protocols used: MEDICATION QUESTION CALL-ADULT-AH      "

## 2023-04-07 NOTE — TELEPHONE ENCOUNTER
Called and spoke with daughter Radha and gave her the rationale behind why amlodipine was stopped.  She verbalizes understanding and will continue to monitor and call the office if BP becomes too elevated.    Enid Walls RN  [facility] Triage  04/07/23 16:27 EDT

## 2023-04-07 NOTE — CASE MANAGEMENT/SOCIAL WORK
Continued Stay Note  [facility]     Patient Name: Helena Carmen  MRN: [id number]  Today's Date: 4/7/2023    Admit Date: 4/4/2023    Plan: Plan home with family and [facility] .   RITA Ashford RN   Discharge Plan     Row Name 04/07/23 1231       Plan    Plan Plan home with family and [facility] .   RITA Ashford RN    Plan Comments Spoke with pt and pt's daughter (Radha Hoyos [phone]) at bedside.   Daughter is requesting a referral to [facility].  Called  ( [phone]) and spoke with Malgorzata with referral for [facility].  [facility]  ( [phone][facility] with [facility] notified pt is discharging today. Plan home with family and [facility].   RITA Ashford RN               Discharge Codes    No documentation.               Expected Discharge Date and Time     Expected Discharge Date Expected Discharge Time    Apr 7, 2023             Radha Ashford RN

## 2023-04-07 NOTE — PLAN OF CARE
Goal Outcome Evaluation:  Plan of Care Reviewed With: patient           Outcome Evaluation: Respirations nonlabored on room air. Oral Lasix given. Scheduled medication for right shoulder, hips, coccyx  and right arm pain. F/C with light yellow urine. Telemetry SR w/ BBB and depressed t wave.

## 2023-04-08 NOTE — OUTREACH NOTE
Prep Survey    Flowsheet Row Responses   [facility] patient discharged from? [place]   Is LACE score < 7 ? No   Eligibility Readm Mgmt   Discharge diagnosis a/c CHF   Does the patient have one of the following disease processes/diagnoses(primary or secondary)? CHF   Does the patient have Home health ordered? Yes   What is the Home health agency?  [facility]   Is there a DME ordered? No   Prep survey completed? Yes          ERNESTO LEVINE - Registered Nurse

## 2023-04-10 ENCOUNTER — READMISSION MANAGEMENT (OUTPATIENT)
Dept: CALL CENTER | Facility: HOSPITAL | Age: 88
End: 2023-04-10
Payer: MEDICARE

## 2023-04-10 NOTE — OUTREACH NOTE
CHF Week 1 Survey    Flowsheet Row Responses   [facility] patient discharged from? [place]   Does the patient have one of the following disease processes/diagnoses(primary or secondary)? CHF   CHF Week 1 attempt successful? No   Unsuccessful attempts Attempt 1          Deanna NIELSON - Licensed Nurse

## 2023-04-11 NOTE — PROGRESS NOTES
"Enter Query Response Below      Query Response: Stage 2 Sacral Pressure Injury, present on admission             If applicable, please update the problem list.   Patient: Helena Carmen        : 1931  Account: [id number]           Admit Date: 2023        How to Respond to this query:       a. Click New Note     b. Answer query within the yellow box.                c. Update the Problem List, if applicable.      If you have any questions about this query contact me at: [email or web address]  [phone]      Dr. Bella:     89 yo F admitted with acute on chronic heart failure. Hosptial course noted, \"Patient had some skin lesions as outlined in wound care note on 2023 which were present prior to admission.\" \"pt seen for evaluation of sacral skin issues POA. Pt with several scattered areas of partial thickness wounds across her back, sacral skin with stage 2 pressure injuries POA, Optifoam dressings are in place. Pt is alert and oriented, able to turn with some assistance. She is on an accumax mattress with added pump for adequate pressure redistribution.  Heels are negative for skin breakdown or erythema. Heels off-loaded with pillows under her calves. Wound care and prevention standing orders placed into Epic. Please re-consult for any additional needs,\" per wound care assessment note. She was also ordered Rhett BID by dietician \"to help promote wound healing.\"     After study, can the patient's \"skin lesions\" be further specified as:    Stage 2 Sacral Pressure Injury, present on admission  Stage 2 Back Pressure Injury, present on admission  Other, please specify: ______________  Unable to determine     By submitting this query, we are merely seeking further clarification of documentation to accurately reflect all conditions that you are monitoring, evaluating, treating or that extend the hospitalization or utilize additional resources of care. Please utilize your independent clinical judgment when " addressing the question(s) above.     This query and your response, once completed, will be entered into the legal medical record.    Sincerely,    CHERYL Velasquez, RN, CCDS -   Clinical Documentation Integrity Program

## 2023-04-11 NOTE — PROGRESS NOTES
"Enter Query Response Below      Query Response: Stage 3b, eGFR 44-30             If applicable, please update the problem list.   Patient: Helena Carmen        : 1931  Account: [id number]           Admit Date: 2023        How to Respond to this query:       a. Click New Note     b. Answer query within the yellow box.                c. Update the Problem List, if applicable.      If you have any questions about this query contact me at: [email or web address]  [phone]     Dr. Bella:     89 yo F with discharge diagnosis of \"stage 4 chronic kidney disease, POA.\" Cardiology consult documentation included, \"CKD 3b.\" eGFR 42.1, 31.5, 28.2, 35.4 during admission.     Please clarify the conflicting documentation for this patient's stage of CKD treated and/or monitored:      Stage 3b, eGFR 44-30  Stage 4, eGFR 29-15  Other, please specify: _____________  Unable to determine     Reference: www.[employer].org    By submitting this query, we are merely seeking further clarification of documentation to accurately reflect all conditions that you are monitoring, evaluating, treating or that extend the hospitalization or utilize additional resources of care. Please utilize your independent clinical judgment when addressing the question(s) above.     This query and your response, once completed, will be entered into the legal medical record.    Sincerely,    CHERYL Velasquez, RN, CCDS -   Clinical Documentation Integrity Program     "

## 2023-04-12 ENCOUNTER — READMISSION MANAGEMENT (OUTPATIENT)
Dept: CALL CENTER | Facility: HOSPITAL | Age: 88
End: 2023-04-12
Payer: MEDICARE

## 2023-04-12 NOTE — OUTREACH NOTE
CHF Week 1 Survey    Flowsheet Row Responses   [facility] patient discharged from? [place]   Does the patient have one of the following disease processes/diagnoses(primary or secondary)? CHF   CHF Week 1 attempt successful? Yes   Call start time 0841   Call end time 0843   Discharge diagnosis a/c CHF   Person spoke with today (if not patient) and relationship juan carlos Mcgregor reviewed with patient/caregiver? Yes   Is the patient having any side effects they believe may be caused by any medication additions or changes? No   Does the patient have all medications ordered at discharge? Yes   Is the patient taking all medications as directed (includes completed medication regime)? Yes   Does the patient have a primary care provider?  Yes   Does the patient have an appointment with their PCP within 7 days of discharge? Yes   Comments regarding PCP this week   Has the patient kept scheduled appointments due by today? N/A   Comments 4/18/23 cardio   What is the Home health agency?  [facility]    Has home health visited the patient within 72 hours of discharge? Yes   Pulse Ox monitoring Intermittent   Pulse Ox device source Patient   O2 Sat comments 99% RA   O2 Sat: education provided Sat levels, Monitoring frequency, When to seek care   Psychosocial issues? No   Did the patient receive a copy of their discharge instructions? Yes   Nursing interventions Reviewed instructions with patient   What is the patient's perception of their health status since discharge? Improving   Nursing interventions Nurse provided patient education   Is the patient able to teach back signs and symptoms of worsening condition? (i.e. weight gain, shortness of air, etc.) Yes   Is the patient/caregiver able to teach back the hierarchy of who to call/visit for symptoms/problems? PCP, Specialist, Home health nurse, Urgent Care, ED, 911 Yes   Is the patient able to teach back Heart Failure Zones? Yes   CHF Nursing Interventions Education  provided on various zones   CHF Zone this Call Green Zone   Green Zone Patient reports doing well   Green Zone Interventions Daily weight check    CHF Week 1 call completed? Yes   Wrap up additional comments Grand daughter reports Pt is doing well. No swelling.    Call end time 0843          AGUSTINA ALVAREZ - Registered Nurse

## 2023-04-12 NOTE — TELEPHONE ENCOUNTER
Attempted to reach patient's daughter to see if there has been a Covid exposure and that Dr. Sherman wanted the CBC done.today prior to her appointment tomorrow.  
Caller: KEELY    Relationship: Other    Best call back number: [phone]    What is the best time to reach you: ANYTIME    Who are you requesting to speak with (clinical staff, provider, specific staff member): DR DIXON OR NURSE    What was the call regarding: KEELY STATED THAT PT WAS DUE FOR LABS BUT THEY STATED THERE WAS A COVID EXPOSURE AND SHE COULDN'T COME OUT TO THE HOME. PLEASE CALL TO ADVISE.     Do you require a callback: YES  
Reviewed chart and returned call to [facility] with the information that Patient is having a telephone visit on 2/1/2023, due to inclement weather.  An order had been placed and called to Enid the nurse manager at [facility] on 1/30/2023, by Dr. Sherman's clinic nurse with the information that patient needed a CBC with results for her appointment via phone on 2/1/2023.  My direct phone number was left if she needed to call me back.    
Never smoker

## 2023-04-17 ENCOUNTER — TELEPHONE (OUTPATIENT)
Dept: NEUROSURGERY | Facility: CLINIC | Age: 88
End: 2023-04-17
Payer: MEDICARE

## 2023-04-17 NOTE — TELEPHONE ENCOUNTER
Called patient left voice message reminding to have XR before office visit 4/20/23 to have XR's completed and no appointment needed just go to  Radiology. If any question to call back at [phone].      [facility] can relay message

## 2023-04-18 ENCOUNTER — OFFICE VISIT (OUTPATIENT)
Dept: CARDIOLOGY | Facility: CLINIC | Age: 88
End: 2023-04-18
Payer: MEDICARE

## 2023-04-18 VITALS
HEIGHT: 57 IN | WEIGHT: 163 LBS | HEART RATE: 54 BPM | DIASTOLIC BLOOD PRESSURE: 80 MMHG | BODY MASS INDEX: 35.17 KG/M2 | SYSTOLIC BLOOD PRESSURE: 120 MMHG

## 2023-04-18 DIAGNOSIS — E78.1 HYPERTRIGLYCERIDEMIA: ICD-10-CM

## 2023-04-18 DIAGNOSIS — I10 ESSENTIAL HYPERTENSION: ICD-10-CM

## 2023-04-18 DIAGNOSIS — I48.0 PAROXYSMAL ATRIAL FIBRILLATION: ICD-10-CM

## 2023-04-18 DIAGNOSIS — I36.1 NONRHEUMATIC TRICUSPID VALVE REGURGITATION: ICD-10-CM

## 2023-04-18 DIAGNOSIS — I50.43 ACUTE ON CHRONIC COMBINED SYSTOLIC AND DIASTOLIC CONGESTIVE HEART FAILURE: Primary | ICD-10-CM

## 2023-04-18 DIAGNOSIS — I35.0 NONRHEUMATIC AORTIC VALVE STENOSIS: ICD-10-CM

## 2023-04-18 DIAGNOSIS — I34.0 NONRHEUMATIC MITRAL VALVE REGURGITATION: ICD-10-CM

## 2023-04-18 RX ORDER — CARVEDILOL 12.5 MG/1
12.5 TABLET ORAL 2 TIMES DAILY
Qty: 60 TABLET | Refills: 11 | Status: SHIPPED | OUTPATIENT
Start: 2023-04-18

## 2023-04-18 RX ORDER — AMLODIPINE BESYLATE 10 MG/1
TABLET ORAL
COMMUNITY
End: 2023-04-18 | Stop reason: ALTCHOICE

## 2023-04-18 RX ORDER — ISOTRETINOIN 10 MG/1
10 CAPSULE ORAL 2 TIMES DAILY
COMMUNITY

## 2023-04-18 RX ORDER — PREDNISONE 10 MG/1
10 TABLET ORAL DAILY
COMMUNITY
End: 2023-04-24 | Stop reason: SDUPTHER

## 2023-04-18 RX ORDER — MAGNESIUM OXIDE 400 MG/1
200 TABLET ORAL DAILY
Qty: 45 TABLET | Refills: 3 | Status: SHIPPED | OUTPATIENT
Start: 2023-04-18

## 2023-04-18 NOTE — PROGRESS NOTES
Date of Office Visit: 2023  Encounter Provider: Beth Butcher MD  Place of Service: [facility] CARDIOLOGY  Patient Name: Helena Carmen  :[id number]      Patient ID:  Helena Carmen is a 89 y.o. 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       She was originally seen  for a pericardial effusion noted on a CT scan of the abdomen and  pelvis.  At that time, she was diagnosed with aortic stenosis.     She had increasing fatigue and dyspnea and was able to do very little, which she thought was due to her back pain.   She had a 2-D echocardiogram with Doppler in 2013. Her ejection   fraction was 58%. There was severe aortic stenosis with valvular area of 0.6 mm cubed, a   peak gradient of 71 with a mean of 41 mmHg. There was also moderate mitral insufficiency, severe   left atrial enlargement, right ventricular systolic pressure of 45 mmHg and grade II diastolic   dysfunction. She had a normal carotid duplex in 2013.     She went to [facility] after having a cardiac catheterization done. Her cardiac  catheterization showed minimal coronary artery disease. Dr. Murry saw her in the valve  clinic and agreed that she needed aortic valvular replacement. Dr. Lagunas saw her and  was in agreement as well. On 2013, she had a 21 mm [employer] Mitch Trifecta bovine  pericardial valve placed.      She was in the hospital with C. Difficile colitis from 2013 to 2013. She also had klebsiella urinary tract infection  and was treated with Levaquin. She was in acute renal failure. She had some pancreatitis  at that time as well. During her hospitalization we did see  her and her cardiac status was stable.     Admission from 3/2/2022 to 3/9/2022 was noted with acute on chronic exacerbation of her diastolic heart failure and acute kidney injury.   She has since been treated with Lasix 40 mg every other day and daily spironolactone per nephrology recommendation.     She was at the [facility] but now is at home.       She follows with Dr. Sherman for her thrombocytopenia and sees him frequently.  He is concerned that she has ITP.  Her platelets seem to maintain as long she is on prednisone.  She follows with Dr. Art Wick from nephrology.     She was hospitalized 6/2022 with urinary tract infection.    She was hospitalized 8/24/2022 - 8/20/2022 for lumbar vertebral fracture.  Surgery recommended conservative treatment.     Echo done 7/31/2022 showed ejection fraction 61-65% with mild concentric left ventricle hypertrophy, grade 2 diastolic dysfunction, normal bioprosthetic aortic valve, mild mitral insufficiency with mild to moderate tricuspid insufficiency, normal RVSP.  She had lower extremity Dopplers done 8/6/2022 showing acute left lower extremity DVT in the soleal vein and acute right lower extremity DVT in the soleal vein, she is now on Eliquis.     She was admitted in March 2023 and discharged 4/7/2023 with diagnoses during hospitalization of acute on chronic systolic and diastolic heart failure-treated with IV diuretics.  She presented there was short windedness.  Labs on 4/7/2023 show sodium 132, glucose 166, creatinine 1.4, potassium 4.2.  She had an echocardiogram done 4/5/2023 showing ejection fraction 50% with moderate left atrial enlargement, bioprosthetic aortic valve normal, RVSP 75 mmHg.  She had MRI lumbar spine done 3/7/2023 showing lumbar fractures.  She had a noncontrasted CT chest on 3/12/2023 which showed buckled contour of the anterior lateral right ribs which likely represented acute buckle fractures with minimal bilateral pleural effusions.    She went to rehabilitation after her hospitalization in December and then went back into heart failure.  It sounds like they were not giving her her Lasix at the rehabilitation  [facility].  In addition she was eating salty food.  She was diuresed during the hospitalization from March and April.  She then came home after this and her family's been care for her.  They are all bit worried because her Synthroid was doubled and the reasons for this are unclear.  When I checked her TSH from December it was normal.  She has no difficulty breathing.  They are weighing her daily and her weight has been stable at 163.  She has minimal lower extremity edema.  She has no orthopnea or PND and is sleeping well.  Her appetite is good and they are watching everything for salt.  She is taking her medications as directed.    Past Medical History:   Diagnosis Date   • Aortic valve stenosis     s/p tissue AVR   • Back pain    • CKD (chronic kidney disease)    • Colitis due to Clostridioides difficile 01/26/2022   • Diastolic dysfunction     Grade 2 per echocardiogram 2013   • Diverticulosis    • Exertional shortness of breath    • Heart disease    • Hiatal hernia    • Hyperlipidemia    • Hypertension    • Hyperthyroidism    • Hypertriglyceridemia 05/31/2018   • Hypothyroidism    • L5 compression fracture (HCC) 08/2022   • Left ventricular hypertrophy    • Legally blind    • Liver disease    • Low back pain    • Macular degeneration    • Mitral regurgitation    • Osteoarthritis of hip    • Osteoporosis    • Pancreatitis 01/26/2022   • Paroxysmal atrial fibrillation    • Peripheral neuropathy    • Premature ventricular contractions    • Pulmonary hypertension    • Renal insufficiency syndrome    • Type 2 diabetes mellitus    • Uterine cancer          Past Surgical History:   Procedure Laterality Date   • AORTIC VALVE REPAIR/REPLACEMENT     • CATARACT EXTRACTION      1970, 1999   • CHOLECYSTECTOMY     • ENDOSCOPY  08/15/2014    no gross lesions in stomach/duodenum, erythrematous mucosa in stomach   • EPIDURAL BLOCK     • HYSTERECTOMY  2007   • STERNOTOMY         Current Outpatient Medications on File Prior  to Visit   Medication Sig Dispense Refill   • bismuth subsalicylate (PEPTO BISMOL) 262 MG/15ML suspension Take 30 mL by mouth Every 6 (Six) Hours As Needed for Indigestion.     • calcium carbonate (TUMS) 500 MG chewable tablet Chew 2 tablets 3 (Three) Times a Day As Needed for Indigestion or Heartburn.     • cholecalciferol (VITAMIN D3) 25 MCG (1000 UT) tablet Take 1 tablet by mouth Daily.     • famotidine (PEPCID) 40 MG tablet Take 1 tablet by mouth Every Night. 30 tablet 0   • fluticasone-salmeterol (Advair HFA) 115-21 MCG/ACT inhaler Inhale 2 puffs 2 (Two) Times a Day. 12 each 11   • furosemide (LASIX) 40 MG tablet Take 1 tablet by mouth Daily.     • gabapentin (NEURONTIN) 600 MG tablet Take 1 tablet by mouth 2 (Two) Times a Day. 6 tablet 0   • hydrALAZINE (APRESOLINE) 25 MG tablet Take 2 tablets by mouth 2 (Two) Times a Day.     • HYDROcodone-acetaminophen (NORCO) 7.5-325 MG per tablet Take 1 tablet by mouth Every 6 (Six) Hours. 12 tablet 0   • insulin glargine (LANTUS, SEMGLEE) 100 UNIT/ML injection Inject 20 Units under the skin into the appropriate area as directed Every Night.     • insulin lispro (ADMELOG) 100 UNIT/ML injection Inject 0-9 Units under the skin into the appropriate area as directed 3 (Three) Times a Day Before Meals.  12   • ISOtretinoin (Claravis) 10 MG capsule Take 1 capsule by mouth 2 (Two) Times a Day.     • levothyroxine (SYNTHROID, LEVOTHROID) 50 MCG tablet Take 1 tablet by mouth Every Morning.     • lidocaine (LIDODERM) 5 % Place 1 patch on the skin as directed by provider Daily. Remove & Discard patch within 12 hours or as directed by MD 6 each 0   • LORazepam (ATIVAN) 0.5 MG tablet Take 1 tablet by mouth Every 8 (Eight) Hours As Needed for Anxiety. 9 tablet 0   • montelukast (SINGULAIR) 10 MG tablet Take 1 tablet by mouth Every Night.     • ondansetron (ZOFRAN) 8 MG tablet Take 1 tablet by mouth As Needed.     • polyethylene glycol (MIRALAX) 17 GM/SCOOP powder Take 17 g by mouth  "Daily.     • predniSONE (DELTASONE) 10 MG tablet Take 1 tablet by mouth Daily.     • tamsulosin (FLOMAX) 0.4 MG capsule 24 hr capsule Take 1 capsule by mouth every night at bedtime.     • [DISCONTINUED] carvedilol (COREG) 12.5 MG tablet Take 1 tablet by mouth 2 (Two) Times a Day. 60 tablet 11   • amLODIPine (NORVASC) 10 MG tablet amlodipine 10 mg tablet     • apixaban (ELIQUIS) 5 MG tablet tablet Eliquis 5 mg tablet     • cetirizine (zyrTEC) 5 MG tablet Take 1 tablet by mouth Daily As Needed for Allergies.     • empagliflozin (JARDIANCE) 10 MG tablet tablet Jardiance 10 mg tablet     • pantoprazole (PROTONIX) 40 MG EC tablet Take 1 tablet by mouth Daily.     • sennosides-docusate (PERICOLACE) 8.6-50 MG per tablet Take 2 tablets by mouth Every Night.       No current facility-administered medications on file prior to visit.       Social History     Socioeconomic History   • Marital status:    Tobacco Use   • Smoking status: Former     Packs/day: 0.50     Types: Cigarettes     Quit date: 7/10/1969     Years since quittin.8   • Smokeless tobacco: Never   Vaping Use   • Vaping Use: Never used   Substance and Sexual Activity   • Alcohol use: No     Comment: caffeine use - coffee 2 cups daily   • Drug use: No   • Sexual activity: Defer           ROS    Procedures    ECG 12 Lead    Date/Time: 2023 2:39 PM  Performed by: Beth Butcher MD  Authorized by: Beth Butcher MD   Comparison: compared with previous ECG   Similar to previous ECG  Rhythm: sinus rhythm    Clinical impression: normal ECG                Objective:      Vitals:    23 1405   BP: 120/80   Pulse: 54   Weight: 73.9 kg (163 lb)   Height: 144 cm (56.69\")     Body mass index is 35.66 kg/m².    Vitals reviewed.   Constitutional:       General: Not in acute distress.     Appearance: Well-developed. Not diaphoretic.   Eyes:      General: No scleral icterus.     Conjunctiva/sclera: Conjunctivae normal.   HENT:      Head: " Normocephalic and atraumatic.   Neck:      Thyroid: No thyromegaly.      Vascular: No carotid bruit or JVD.      Lymphadenopathy: No cervical adenopathy.   Pulmonary:      Effort: Pulmonary effort is normal. No respiratory distress.      Breath sounds: Normal breath sounds. No wheezing. No rhonchi. No rales.   Chest:      Chest wall: Not tender to palpatation.   Cardiovascular:      Normal rate. Regular rhythm.      Murmurs: There is no murmur.      No gallop.   Pulses:     Intact distal pulses.   Edema:     Peripheral edema absent.   Abdominal:      General: Bowel sounds are normal. There is no distension or abdominal bruit.      Palpations: Abdomen is soft. There is no abdominal mass.      Tenderness: There is no abdominal tenderness.   Musculoskeletal:         General: No deformity.      Extremities: No clubbing present.     Cervical back: Neck supple. Skin:     General: Skin is warm and dry. There is no cyanosis.      Coloration: Skin is not pale.      Findings: No rash.   Neurological:      Mental Status: Alert and oriented to person, place, and time.      Cranial Nerves: No cranial nerve deficit.   Psychiatric:         Judgment: Judgment normal.         Lab Review:       Assessment:      Diagnosis Plan   1. Acute on chronic combined systolic and diastolic congestive heart failure  TSH    Uric Acid    Magnesium    Basic Metabolic Panel    CBC & Differential      2. Nonrheumatic aortic valve stenosis        3. Essential hypertension  TSH    Uric Acid    Magnesium    Basic Metabolic Panel    CBC & Differential      4. Hypertriglyceridemia        5. Nonrheumatic mitral valve regurgitation        6. Nonrheumatic tricuspid valve regurgitation        7. Paroxysmal atrial fibrillation  TSH    Uric Acid    Magnesium    Basic Metabolic Panel    CBC & Differential        1. Severe aortic stenosis, status post AVR [facility] Mitch Trifecta bovine   pericardial valve, 21 mm on 06/08/2013. Doing well.   2.  HFpEF, chronic with  history of acute exacerbations.  Right now her HFpEF is stable, she is not decompensated.  3. Small hiatal hernia. Followed by Dr. Freddy Martel.  4. Diverticular disease. Followed by Dr. Freddy Martel.  5. Diabetes mellitus, type 2. Followed by Dr. Hickman.   6. Grade II diastolic dysfunction.  7. Hypothyroidism.  8. 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  9. HTN, labile.  10.  CKD, stable but possibly made worse at times by uncontrolled hypertension.  Follows with Dr. Art Wick.  11.  Thrombocytopenia, likely ITP.  Seeing Dr. Sherman.  12.  Bilateral lower extremity DVTs, now on apixaban, diagnosed 8/2022  13.  Lumbar vertebral fracture diagnosed 8/2022, treated conservatively.  Has back pain and this increases her blood pressure.     Plan:       Decrease carvedilol to 6.25 mg in the morning and maintain 12.5 mg nightly.  Decrease levothyroxine to 25 mcg daily.  Her last TSH was normal but her levothyroxine got doubled in the hospital for reasons are unclear.  She had been maintained on 25 so I am going to decrease this back down to 25.  See Shikha in 1 month, check laboratory values.    Continue daily weights, daily monitoring of blood pressure, avoiding salt.

## 2023-04-19 ENCOUNTER — READMISSION MANAGEMENT (OUTPATIENT)
Dept: CALL CENTER | Facility: HOSPITAL | Age: 88
End: 2023-04-19
Payer: MEDICARE

## 2023-04-19 NOTE — OUTREACH NOTE
CHF Week 2 Survey    Flowsheet Row Responses   [facility] facility patient discharged from? [place]   Does the patient have one of the following disease processes/diagnoses(primary or secondary)? CHF   Week 2 attempt successful? No   Unsuccessful attempts Attempt 1          Laxmi FRANCOIS - Registered Nurse

## 2023-04-24 ENCOUNTER — READMISSION MANAGEMENT (OUTPATIENT)
Dept: CALL CENTER | Facility: HOSPITAL | Age: 88
End: 2023-04-24
Payer: MEDICARE

## 2023-04-24 RX ORDER — PREDNISONE 10 MG/1
10 TABLET ORAL DAILY
Qty: 30 TABLET | Refills: 2 | Status: ON HOLD | OUTPATIENT
Start: 2023-04-24

## 2023-04-24 NOTE — TELEPHONE ENCOUNTER
Caller: ARAM SPAIN    Relationship: Emergency Contact    Best call back number: [phone]  Requested Prescriptions:   Requested Prescriptions     Pending Prescriptions Disp Refills   • predniSONE (DELTASONE) 10 MG tablet       Sig: Take 1 tablet by mouth Daily.        Pharmacy where request should be sent: [id number] [place], [place] - [address] AT [facility] [phone] [facility] [phone] FX     Last office visit with prescribing clinician: 3/21/2023   Last telemedicine visit with prescribing clinician: 5/23/2023   Next office visit with prescribing clinician: 5/23/2023     DOES DR DIXON WANT TO REFILL OR DO BLOOD WORK FIRST        Does the patient have less than a 3 day supply:  [x] Yes  [] No    Would you like a call back once the refill request has been completed: [x] Yes [] No        Brittany Cesar Rep   04/24/23 11:46 EDT

## 2023-04-24 NOTE — OUTREACH NOTE
CHF Week 2 Survey    Flowsheet Row Responses   [facility] patient discharged from? [place]   Does the patient have one of the following disease processes/diagnoses(primary or secondary)? CHF   Week 2 attempt successful? Yes   Call start time 1733   Call end time 1737   Discharge diagnosis a/c CHF   Medication alerts for this patient per dtr the synthroid dose was adjusted to original dose.    Meds reviewed with patient/caregiver? Yes   Is the patient having any side effects they believe may be caused by any medication additions or changes? No   Does the patient have all medications ordered at discharge? Yes   Is the patient taking all medications as directed (includes completed medication regime)? Yes   Does the patient have a primary care provider?  Yes   Has the patient kept scheduled appointments due by today? Yes   What is the Home health agency?  [facility]    Has home health visited the patient within 72 hours of discharge? Yes   Pulse Ox monitoring Intermittent   Pulse Ox device source Patient   O2 Sat comments RA-99%   Psychosocial issues? No   Comments Per dtr, pt still has nighttime phlegm in throat. But overall doing better.    What is the patient's perception of their health status since discharge? Improving   Nursing interventions Nurse provided patient education   Is the patient able to teach back signs and symptoms of worsening condition? (i.e. weight gain, shortness of air, etc.) Yes   CHF Zone this Call Green Zone   Green Zone Patient reports doing well, No new or worsening shortness of breath, Physical activity level is normal for you, Weight check stable, No new swelling -  feet, ankles and legs look normal for you   Green Zone Interventions Daily weight check, Meds as directed, Low sodium diet, Follow up visits planned   CHF Week 2 call completed? Yes   Revoked No further contact(revokes)-requires comment   Graduated/Revoked comments has 24/7 care   Call end time 1737          Aline  B - Registered Nurse

## 2023-04-30 ENCOUNTER — APPOINTMENT (OUTPATIENT)
Dept: GENERAL RADIOLOGY | Facility: HOSPITAL | Age: 88
DRG: 698 | End: 2023-04-30
Payer: MEDICARE

## 2023-04-30 ENCOUNTER — HOSPITAL ENCOUNTER (INPATIENT)
Facility: HOSPITAL | Age: 88
LOS: 3 days | Discharge: HOME HEALTH | DRG: 698 | End: 2023-05-04
Attending: EMERGENCY MEDICINE | Admitting: INTERNAL MEDICINE
Payer: MEDICARE

## 2023-04-30 DIAGNOSIS — N39.0 ACUTE UTI: Primary | ICD-10-CM

## 2023-04-30 DIAGNOSIS — M79.602 LEFT ARM PAIN: ICD-10-CM

## 2023-04-30 PROBLEM — I50.33 ACUTE ON CHRONIC DIASTOLIC (CONGESTIVE) HEART FAILURE: Status: RESOLVED | Noted: 2022-03-09 | Resolved: 2023-04-30

## 2023-04-30 PROBLEM — I50.33 ACUTE ON CHRONIC DIASTOLIC (CONGESTIVE) HEART FAILURE: Status: ACTIVE | Noted: 2022-03-09

## 2023-04-30 PROBLEM — H54.0X33: Status: ACTIVE | Noted: 2022-03-09

## 2023-04-30 PROBLEM — B96.5 PSEUDOMONAS (AERUGINOSA) (MALLEI) (PSEUDOMALLEI) AS THE CAUSE OF DISEASES CLASSIFIED ELSEWHERE: Status: ACTIVE | Noted: 2022-06-09

## 2023-04-30 PROBLEM — R41.841 COGNITIVE COMMUNICATION DEFICIT: Status: ACTIVE | Noted: 2022-03-09

## 2023-04-30 LAB
ALBUMIN SERPL-MCNC: 3.3 G/DL (ref 3.5–5.2)
ALBUMIN/GLOB SERPL: 1.1 G/DL
ALP SERPL-CCNC: 66 U/L (ref 39–117)
ALT SERPL W P-5'-P-CCNC: 39 U/L (ref 1–33)
ANION GAP SERPL CALCULATED.3IONS-SCNC: 9.1 MMOL/L (ref 5–15)
AST SERPL-CCNC: 38 U/L (ref 1–32)
B PARAPERT DNA SPEC QL NAA+PROBE: NOT DETECTED
B PERT DNA SPEC QL NAA+PROBE: NOT DETECTED
BACTERIA UR QL AUTO: ABNORMAL /HPF
BASOPHILS # BLD AUTO: 0.02 10*3/MM3 (ref 0–0.2)
BASOPHILS NFR BLD AUTO: 0.1 % (ref 0–1.5)
BILIRUB SERPL-MCNC: 0.5 MG/DL (ref 0–1.2)
BILIRUB UR QL STRIP: NEGATIVE
BUN SERPL-MCNC: 45 MG/DL (ref 8–23)
BUN/CREAT SERPL: 31 (ref 7–25)
C PNEUM DNA NPH QL NAA+NON-PROBE: NOT DETECTED
CALCIUM SPEC-SCNC: 9.1 MG/DL (ref 8.2–9.6)
CHLORIDE SERPL-SCNC: 92 MMOL/L (ref 98–107)
CLARITY UR: ABNORMAL
CO2 SERPL-SCNC: 27.9 MMOL/L (ref 22–29)
COLOR UR: YELLOW
CREAT SERPL-MCNC: 1.45 MG/DL (ref 0.57–1)
D-LACTATE SERPL-SCNC: 2.4 MMOL/L (ref 0.5–2)
D-LACTATE SERPL-SCNC: 2.9 MMOL/L (ref 0.5–2)
DEPRECATED RDW RBC AUTO: 51.3 FL (ref 37–54)
EGFRCR SERPLBLD CKD-EPI 2021: 33.9 ML/MIN/1.73
EOSINOPHIL # BLD AUTO: 0.01 10*3/MM3 (ref 0–0.4)
EOSINOPHIL NFR BLD AUTO: 0.1 % (ref 0.3–6.2)
ERYTHROCYTE [DISTWIDTH] IN BLOOD BY AUTOMATED COUNT: 15.9 % (ref 12.3–15.4)
FLUAV SUBTYP SPEC NAA+PROBE: NOT DETECTED
FLUBV RNA ISLT QL NAA+PROBE: NOT DETECTED
GEN 5 2HR TROPONIN T REFLEX: 87 NG/L
GLOBULIN UR ELPH-MCNC: 3.1 GM/DL
GLUCOSE BLDC GLUCOMTR-MCNC: 290 MG/DL (ref 70–130)
GLUCOSE SERPL-MCNC: 145 MG/DL (ref 65–99)
GLUCOSE UR STRIP-MCNC: NEGATIVE MG/DL
HADV DNA SPEC NAA+PROBE: NOT DETECTED
HCOV 229E RNA SPEC QL NAA+PROBE: NOT DETECTED
HCOV HKU1 RNA SPEC QL NAA+PROBE: NOT DETECTED
HCOV NL63 RNA SPEC QL NAA+PROBE: NOT DETECTED
HCOV OC43 RNA SPEC QL NAA+PROBE: NOT DETECTED
HCT VFR BLD AUTO: 36.8 % (ref 34–46.6)
HGB BLD-MCNC: 12.3 G/DL (ref 12–15.9)
HGB UR QL STRIP.AUTO: ABNORMAL
HMPV RNA NPH QL NAA+NON-PROBE: NOT DETECTED
HPIV1 RNA ISLT QL NAA+PROBE: NOT DETECTED
HPIV2 RNA SPEC QL NAA+PROBE: NOT DETECTED
HPIV3 RNA NPH QL NAA+PROBE: NOT DETECTED
HPIV4 P GENE NPH QL NAA+PROBE: NOT DETECTED
HYALINE CASTS UR QL AUTO: ABNORMAL /LPF
KETONES UR QL STRIP: NEGATIVE
LEUKOCYTE ESTERASE UR QL STRIP.AUTO: ABNORMAL
LYMPHOCYTES # BLD AUTO: 0.38 10*3/MM3 (ref 0.7–3.1)
LYMPHOCYTES NFR BLD AUTO: 2.8 % (ref 19.6–45.3)
M PNEUMO IGG SER IA-ACNC: NOT DETECTED
MCH RBC QN AUTO: 29.5 PG (ref 26.6–33)
MCHC RBC AUTO-ENTMCNC: 33.4 G/DL (ref 31.5–35.7)
MCV RBC AUTO: 88.2 FL (ref 79–97)
MONOCYTES # BLD AUTO: 0.55 10*3/MM3 (ref 0.1–0.9)
MONOCYTES NFR BLD AUTO: 4 % (ref 5–12)
NEUTROPHILS NFR BLD AUTO: 12.72 10*3/MM3 (ref 1.7–7)
NEUTROPHILS NFR BLD AUTO: 92.6 % (ref 42.7–76)
NITRITE UR QL STRIP: NEGATIVE
NT-PROBNP SERPL-MCNC: 3129 PG/ML (ref 0–1800)
PH UR STRIP.AUTO: 7 [PH] (ref 5–8)
PLATELET # BLD AUTO: 59 10*3/MM3 (ref 140–450)
PMV BLD AUTO: 12 FL (ref 6–12)
POTASSIUM SERPL-SCNC: 4.9 MMOL/L (ref 3.5–5.2)
PROCALCITONIN SERPL-MCNC: 0.33 NG/ML (ref 0–0.25)
PROT SERPL-MCNC: 6.4 G/DL (ref 6–8.5)
PROT UR QL STRIP: ABNORMAL
QT INTERVAL: 440 MS
RBC # BLD AUTO: 4.17 10*6/MM3 (ref 3.77–5.28)
RBC # UR STRIP: ABNORMAL /HPF
REF LAB TEST METHOD: ABNORMAL
RHINOVIRUS RNA SPEC NAA+PROBE: NOT DETECTED
RSV RNA NPH QL NAA+NON-PROBE: NOT DETECTED
SARS-COV-2 RNA NPH QL NAA+NON-PROBE: NOT DETECTED
SODIUM SERPL-SCNC: 129 MMOL/L (ref 136–145)
SP GR UR STRIP: 1.01 (ref 1–1.03)
SQUAMOUS #/AREA URNS HPF: ABNORMAL /HPF
TROPONIN T DELTA: 10 NG/L
TROPONIN T SERPL HS-MCNC: 77 NG/L
UROBILINOGEN UR QL STRIP: ABNORMAL
WBC # UR STRIP: ABNORMAL /HPF
WBC NRBC COR # BLD: 13.73 10*3/MM3 (ref 3.4–10.8)

## 2023-04-30 PROCEDURE — 87186 SC STD MICRODIL/AGAR DIL: CPT | Performed by: EMERGENCY MEDICINE

## 2023-04-30 PROCEDURE — 0202U NFCT DS 22 TRGT SARS-COV-2: CPT | Performed by: EMERGENCY MEDICINE

## 2023-04-30 PROCEDURE — P9612 CATHETERIZE FOR URINE SPEC: HCPCS

## 2023-04-30 PROCEDURE — G0378 HOSPITAL OBSERVATION PER HR: HCPCS

## 2023-04-30 PROCEDURE — 99285 EMERGENCY DEPT VISIT HI MDM: CPT

## 2023-04-30 PROCEDURE — 87077 CULTURE AEROBIC IDENTIFY: CPT | Performed by: EMERGENCY MEDICINE

## 2023-04-30 PROCEDURE — 87086 URINE CULTURE/COLONY COUNT: CPT | Performed by: EMERGENCY MEDICINE

## 2023-04-30 PROCEDURE — 87150 DNA/RNA AMPLIFIED PROBE: CPT | Performed by: EMERGENCY MEDICINE

## 2023-04-30 PROCEDURE — 87040 BLOOD CULTURE FOR BACTERIA: CPT | Performed by: EMERGENCY MEDICINE

## 2023-04-30 PROCEDURE — 93005 ELECTROCARDIOGRAM TRACING: CPT | Performed by: EMERGENCY MEDICINE

## 2023-04-30 PROCEDURE — 82948 REAGENT STRIP/BLOOD GLUCOSE: CPT

## 2023-04-30 PROCEDURE — 83605 ASSAY OF LACTIC ACID: CPT | Performed by: EMERGENCY MEDICINE

## 2023-04-30 PROCEDURE — 81001 URINALYSIS AUTO W/SCOPE: CPT | Performed by: EMERGENCY MEDICINE

## 2023-04-30 PROCEDURE — 83880 ASSAY OF NATRIURETIC PEPTIDE: CPT | Performed by: EMERGENCY MEDICINE

## 2023-04-30 PROCEDURE — 84484 ASSAY OF TROPONIN QUANT: CPT | Performed by: EMERGENCY MEDICINE

## 2023-04-30 PROCEDURE — 25010000002 LEVOFLOXACIN PER 250 MG: Performed by: EMERGENCY MEDICINE

## 2023-04-30 PROCEDURE — 80053 COMPREHEN METABOLIC PANEL: CPT | Performed by: EMERGENCY MEDICINE

## 2023-04-30 PROCEDURE — 84145 PROCALCITONIN (PCT): CPT | Performed by: EMERGENCY MEDICINE

## 2023-04-30 PROCEDURE — 71045 X-RAY EXAM CHEST 1 VIEW: CPT

## 2023-04-30 PROCEDURE — 93010 ELECTROCARDIOGRAM REPORT: CPT | Performed by: INTERNAL MEDICINE

## 2023-04-30 PROCEDURE — 85025 COMPLETE CBC W/AUTO DIFF WBC: CPT | Performed by: EMERGENCY MEDICINE

## 2023-04-30 RX ORDER — SODIUM CHLORIDE 9 MG/ML
125 INJECTION, SOLUTION INTRAVENOUS CONTINUOUS
Status: DISCONTINUED | OUTPATIENT
Start: 2023-04-30 | End: 2023-05-01

## 2023-04-30 RX ORDER — LORAZEPAM 0.5 MG/1
0.5 TABLET ORAL ONCE
Status: COMPLETED | OUTPATIENT
Start: 2023-05-01 | End: 2023-04-30

## 2023-04-30 RX ORDER — SODIUM CHLORIDE 0.9 % (FLUSH) 0.9 %
10 SYRINGE (ML) INJECTION AS NEEDED
Status: DISCONTINUED | OUTPATIENT
Start: 2023-04-30 | End: 2023-05-04 | Stop reason: HOSPADM

## 2023-04-30 RX ORDER — LEVOFLOXACIN 5 MG/ML
750 INJECTION, SOLUTION INTRAVENOUS ONCE
Status: COMPLETED | OUTPATIENT
Start: 2023-04-30 | End: 2023-04-30

## 2023-04-30 RX ADMIN — SODIUM CHLORIDE 500 ML: 9 INJECTION, SOLUTION INTRAVENOUS at 19:35

## 2023-04-30 RX ADMIN — LORAZEPAM 0.5 MG: 0.5 TABLET ORAL at 23:21

## 2023-04-30 RX ADMIN — SODIUM CHLORIDE 125 ML/HR: 9 INJECTION, SOLUTION INTRAVENOUS at 19:35

## 2023-04-30 RX ADMIN — LEVOFLOXACIN 750 MG: 5 INJECTION, SOLUTION INTRAVENOUS at 19:34

## 2023-04-30 NOTE — ED NOTES
Pt to er via ems from home with c/o bilateral arm pain, face pain, and HA since this morning with increased weakness. No blood thinner. No reported falls. Pt found 90% RA placed on 2L.

## 2023-04-30 NOTE — ED NOTES
"Pt states she is having bilateral face \"tenderness\" , no droop or difference in sensation present   "

## 2023-04-30 NOTE — Clinical Note
Level of Care: Telemetry [5]   Diagnosis: Acute UTI [[id number]]   Admitting Physician: KADEEM RIVAS [[id number]]   Attending Physician: KADEEM RIVAS [[id number]]

## 2023-04-30 NOTE — ED PROVIDER NOTES
EMERGENCY DEPARTMENT ENCOUNTER    Room Number:  E568/1  Date seen:  4/30/2023  PCP: Mariah Hickman MD  Historian: Patient and multiple family members at bedside  Relevant information provided by sources other than the patient, review of external records, and social determinants of health may be included in the HPI section.      HPI:  Chief Complaint: Multiple complaints that showed nonspecific  Additional historical features obtained directly from: Patient's multiple family members at bedside give additional information related to the symptoms that they observed, and specifically they stated that if she is admitted that they do not want Dr. Arnold to be the admitting physician because of a previous adverse experience  Context: Helena Carmen is a 89 y.o. female who presents to the ED c/o multiple complaints.  Patient is difficult to follow from a historical standpoint, but she said that earlier today she was in her usual state of health when she experienced sudden onset of pain in the arm, diaphoresis, shortness of breath, and feeling lightheaded.  This lasted for few minutes and then resolved.  Since then she is felt weak.  Patient also has an indwelling Hoffman catheter which she has had for about a month and a half now according to family.    In reviewing the past medical records in [facility], I see that patient was discharged from our facility on April 7 by Dr. Bella with a diagnosis of acute on chronic congestive heart failure along with other multiple comorbidities        Initial impression including social determinants which will impact the assessment patient overall is a poor historian and there is a lot of scattered input from family members at bedside.  I spent quite a bit of time trying to organize all the thoughts into 1 cohesive history          PAST MEDICAL HISTORY  Active Ambulatory Problems     Diagnosis Date Noted   • Aortic valve stenosis 04/05/2016   • Fatigue 04/05/2016   • MI (mitral  incompetence) 04/05/2016   • PVC (premature ventricular contraction) 04/05/2016   • Legally blind 05/25/2018   • Essential hypertension 05/25/2018   • Hypertriglyceridemia 05/31/2018   • Pulmonary hypertension 06/25/2020   • Paroxysmal atrial fibrillation 06/25/2020   • Anxiety 07/10/2014   • Stage 4 chronic kidney disease 03/20/2015   • Chronic pain disorder 01/26/2022   • Degeneration of lumbar intervertebral disc 09/09/2014   • Type 2 diabetes mellitus with hyperglycemia 01/26/2022   • Esophageal reflux 07/10/2014   • Gastroparesis 01/26/2022   • Hiatal hernia 01/26/2022   • Iatrogenic hypothyroidism 07/08/2014   • Macular degeneration 01/26/2022   • Malignant neoplasm of uterus 01/26/2022   • Osteoarthritis of knee 07/10/2014   • Peripheral nerve disease 07/10/2014   • Secondary hyperparathyroidism 04/05/2016   • Thrombocytopenia 07/11/2014   • Chronic heart failure with preserved ejection fraction 03/02/2022   • Other cirrhosis of liver 03/03/2022   • Hypothyroidism 02/22/2022   • Nonrheumatic tricuspid valve regurgitation 02/22/2022   • Anemia, chronic disease 07/30/2022   • Hyponatremia 07/30/2022   • Closed fracture of fifth lumbar vertebra 08/24/2022   • Closed fracture of fifth lumbar vertebra, unspecified fracture morphology, initial encounter 08/25/2022   • Diabetic polyneuropathy associated with type 2 diabetes mellitus 08/26/2022   • Chronic ITP (idiopathic thrombocytopenia) 08/28/2022   • Chronic midline low back pain with bilateral sciatica 10/16/2022   • Acute deep vein thrombosis of calf, bilateral 10/17/2022   • Acute left-sided low back pain with left-sided sciatica 03/07/2023   • Urine retention 03/10/2023   • Rib fracture 03/14/2023   • Acute on chronic combined systolic and diastolic congestive heart failure 04/04/2023   • Blindness right eye category 3, blindness left eye category 3 03/09/2022   • Cognitive communication deficit 03/09/2022   • Pseudomonas (aeruginosa) (mallei) (pseudomallei)  as the cause of diseases classified elsewhere 06/09/2022     Resolved Ambulatory Problems     Diagnosis Date Noted   • Diastolic dysfunction 04/05/2016   • Hypertensive pulmonary vascular disease (HCC) 04/05/2016   • Bioprosthetic aortic valve replacement 05/10/2017   • Breast screening 07/08/2014   • Abdominal pain, right lower quadrant 12/13/2020   • Allergic rhinitis 07/10/2014   • Angina pectoris 07/10/2014   • Pancreatitis 01/26/2022   • Colitis due to Clostridioides difficile 01/26/2022   • Hypothyroidism 01/26/2022   • Long term (current) use of opiate analgesic 01/26/2022   • Uncontrolled type 2 diabetes mellitus 07/08/2014   • Benign essential hypertension 07/08/2014   • Hypertension 01/26/2022   • Pulmonary hypertension 04/05/2016   • Hypercholesterolemia 01/26/2022   • Hyperlipidemia 07/08/2014   • Legal blindness 05/25/2018   • Mitral valve disorder 07/10/2014   • Mitral valve regurgitation 04/05/2016   • Ventricular premature beats 04/05/2016   • History of aortic valve replacement 05/10/2017   • Nonrheumatic tricuspid valve regurgitation    • Elevated serum creatinine    • CHF (congestive heart failure) 03/02/2022   • Hypertensive disorder 02/22/2022   • Pancreatitis 02/22/2022   • Sepsis 06/05/2022   • COVID-19 07/30/2022   • Weakness generalized 07/30/2022   • Leukopenia 08/02/2022   • Cytokine release syndrome, grade 1 08/02/2022   • Urine retention 08/02/2022   • COVID-19 virus infection 08/06/2022   • Acute DVT (deep venous thrombosis) 08/07/2022   • Lumbar compression fracture, closed, initial encounter 10/17/2022   • Acute diastolic congestive heart failure 12/20/2022   • Esophageal dysphagia 12/24/2022   • Stage 3b chronic kidney disease 03/20/2015   • Acute on chronic diastolic (congestive) heart failure 03/09/2022     Past Medical History:   Diagnosis Date   • Back pain    • CKD (chronic kidney disease)    • Diverticulosis    • Exertional shortness of breath    • Heart disease    •  Hyperthyroidism    • L5 compression fracture (HCC) 2022   • Left ventricular hypertrophy    • Liver disease    • Low back pain    • Mitral regurgitation    • Osteoarthritis of hip    • Osteoporosis    • Peripheral neuropathy    • Premature ventricular contractions    • Renal insufficiency syndrome    • Type 2 diabetes mellitus    • Uterine cancer          PAST SURGICAL HISTORY  Past Surgical History:   Procedure Laterality Date   • AORTIC VALVE REPAIR/REPLACEMENT     • CATARACT EXTRACTION      ,    • CHOLECYSTECTOMY     • ENDOSCOPY  08/15/2014    no gross lesions in stomach/duodenum, erythrematous mucosa in stomach   • EPIDURAL BLOCK     • HYSTERECTOMY     • STERNOTOMY           FAMILY HISTORY  Family History   Problem Relation Age of Onset   • Heart disease Mother    • Hypertension Mother    • Stroke Mother    • Diabetes Mother         mellitus   • Other Other         cardiovascular disorder         SOCIAL HISTORY  Social History     Socioeconomic History   • Marital status:    Tobacco Use   • Smoking status: Former     Packs/day: 0.50     Types: Cigarettes     Quit date: 7/10/1969     Years since quittin.8   • Smokeless tobacco: Never   Vaping Use   • Vaping Use: Never used   Substance and Sexual Activity   • Alcohol use: No     Comment: caffeine use - coffee 2 cups daily   • Drug use: No   • Sexual activity: Defer         ALLERGIES  Baclofen, Erythromycin, Furosemide, Statins, Cephalexin, Penicillins, and Sulfa antibiotics          PHYSICAL EXAM  ED Triage Vitals [23 1726]   Temp Heart Rate Resp BP SpO2   99.4 °F (37.4 °C) 59 17 130/60 100 %      Temp src Heart Rate Source Patient Position BP Location FiO2 (%)   -- -- -- -- --         Physical Exam      GENERAL: *Awake and alert, very pleasant elderly female in no acute distress  HENT: nares patent  EYES: no scleral icterus, PERRL, EOMI  CV: regular rhythm, normal rate, distal pulses symmetric and palpable  RESPIRATORY: normal  effort, CTA bilaterally  ABDOMEN: soft, nondistended nontender with normal bowel sound  MUSCULOSKELETAL: no deformity, no joint swelling or significant areas of abnormality  NEURO: alert, moves all extremities, follows commands  PSYCH:  calm, cooperative  SKIN: warm, dry with no rash        LAB RESULTS  Recent Results (from the past 24 hour(s))   Comprehensive Metabolic Panel    Collection Time: 04/30/23  6:06 PM    Specimen: Blood   Result Value Ref Range    Glucose 145 (H) 65 - 99 mg/dL    BUN 45 (H) 8 - 23 mg/dL    Creatinine 1.45 (H) 0.57 - 1.00 mg/dL    Sodium 129 (L) 136 - 145 mmol/L    Potassium 4.9 3.5 - 5.2 mmol/L    Chloride 92 (L) 98 - 107 mmol/L    CO2 27.9 22.0 - 29.0 mmol/L    Calcium 9.1 8.2 - 9.6 mg/dL    Total Protein 6.4 6.0 - 8.5 g/dL    Albumin 3.3 (L) 3.5 - 5.2 g/dL    ALT (SGPT) 39 (H) 1 - 33 U/L    AST (SGOT) 38 (H) 1 - 32 U/L    Alkaline Phosphatase 66 39 - 117 U/L    Total Bilirubin 0.5 0.0 - 1.2 mg/dL    Globulin 3.1 gm/dL    A/G Ratio 1.1 g/dL    BUN/Creatinine Ratio 31.0 (H) 7.0 - 25.0    Anion Gap 9.1 5.0 - 15.0 mmol/L    eGFR 33.9 (L) >60.0 mL/min/1.73   Single High Sensitivity Troponin T    Collection Time: 04/30/23  6:06 PM    Specimen: Blood   Result Value Ref Range    HS Troponin T 77 (C) <10 ng/L   BNP    Collection Time: 04/30/23  6:06 PM    Specimen: Blood   Result Value Ref Range    proBNP 3,129.0 (H) 0.0 - 1,800.0 pg/mL   Procalcitonin    Collection Time: 04/30/23  6:06 PM    Specimen: Blood   Result Value Ref Range    Procalcitonin 0.33 (H) 0.00 - 0.25 ng/mL   Lactic Acid, Plasma    Collection Time: 04/30/23  6:06 PM    Specimen: Blood   Result Value Ref Range    Lactate 2.4 (C) 0.5 - 2.0 mmol/L   CBC Auto Differential    Collection Time: 04/30/23  6:06 PM    Specimen: Blood   Result Value Ref Range    WBC 13.73 (H) 3.40 - 10.80 10*3/mm3    RBC 4.17 3.77 - 5.28 10*6/mm3    Hemoglobin 12.3 12.0 - 15.9 g/dL    Hematocrit 36.8 34.0 - 46.6 %    MCV 88.2 79.0 - 97.0 fL    MCH 29.5  26.6 - 33.0 pg    MCHC 33.4 31.5 - 35.7 g/dL    RDW 15.9 (H) 12.3 - 15.4 %    RDW-SD 51.3 37.0 - 54.0 fl    MPV 12.0 6.0 - 12.0 fL    Platelets 59 (L) 140 - 450 10*3/mm3    Neutrophil % 92.6 (H) 42.7 - 76.0 %    Lymphocyte % 2.8 (L) 19.6 - 45.3 %    Monocyte % 4.0 (L) 5.0 - 12.0 %    Eosinophil % 0.1 (L) 0.3 - 6.2 %    Basophil % 0.1 0.0 - 1.5 %    Neutrophils, Absolute 12.72 (H) 1.70 - 7.00 10*3/mm3    Lymphocytes, Absolute 0.38 (L) 0.70 - 3.10 10*3/mm3    Monocytes, Absolute 0.55 0.10 - 0.90 10*3/mm3    Eosinophils, Absolute 0.01 0.00 - 0.40 10*3/mm3    Basophils, Absolute 0.02 0.00 - 0.20 10*3/mm3   Respiratory Panel PCR w/COVID-19(SARS-CoV-2) LATONIA/CHARLIE/ALEJANDRO/PAD/COR/MAD/PASQUALE In-House, NP Swab in [place], 3-4 HR TAT - Swab, Nasopharynx    Collection Time: 04/30/23  6:07 PM    Specimen: Nasopharynx; Swab   Result Value Ref Range    ADENOVIRUS, PCR Not Detected Not Detected    Coronavirus 229E Not Detected Not Detected    Coronavirus HKU1 Not Detected Not Detected    Coronavirus NL63 Not Detected Not Detected    Coronavirus OC43 Not Detected Not Detected    COVID19 Not Detected Not Detected - Ref. Range    Human Metapneumovirus Not Detected Not Detected    Human Rhinovirus/Enterovirus Not Detected Not Detected    Influenza A PCR Not Detected Not Detected    Influenza B PCR Not Detected Not Detected    Parainfluenza Virus 1 Not Detected Not Detected    Parainfluenza Virus 2 Not Detected Not Detected    Parainfluenza Virus 3 Not Detected Not Detected    Parainfluenza Virus 4 Not Detected Not Detected    RSV, PCR Not Detected Not Detected    Bordetella pertussis pcr Not Detected Not Detected    Bordetella parapertussis PCR Not Detected Not Detected    Chlamydophila pneumoniae PCR Not Detected Not Detected    Mycoplasma pneumo by PCR Not Detected Not Detected   Urinalysis With Culture If Indicated - Urine, Catheter    Collection Time: 04/30/23  6:07 PM    Specimen: Urine, Catheter   Result Value Ref Range    Color, UA  "Yellow Yellow, Straw    Appearance, UA Cloudy (A) Clear    pH, UA 7.0 5.0 - 8.0    Specific Gravity, UA 1.009 1.005 - 1.030    Glucose, UA Negative Negative    Ketones, UA Negative Negative    Bilirubin, UA Negative Negative    Blood, UA Small (1+) (A) Negative    Protein, UA >=300 mg/dL (3+) (A) Negative    Leuk Esterase, UA Large (3+) (A) Negative    Nitrite, UA Negative Negative    Urobilinogen, UA 0.2 E.U./dL 0.2 - 1.0 E.U./dL   Urinalysis, Microscopic Only - Urine, Catheter    Collection Time: 04/30/23  6:07 PM    Specimen: Urine, Catheter   Result Value Ref Range    RBC, UA 3-5 (A) None Seen, 0-2 /HPF    WBC, UA Too Numerous to Count (A) None Seen, 0-2 /HPF    Bacteria, UA 4+ (A) None Seen /HPF    Squamous Epithelial Cells, UA 3-6 (A) None Seen, 0-2 /HPF    Hyaline Casts, UA 0-2 None Seen /LPF    Methodology Automated Microscopy    ECG 12 Lead Other; weakness    Collection Time: 04/30/23  6:15 PM   Result Value Ref Range    QT Interval 440 ms   High Sensitivity Troponin T 2Hr    Collection Time: 04/30/23  9:31 PM    Specimen: Blood   Result Value Ref Range    HS Troponin T 87 (C) <10 ng/L    Troponin T Delta 10 (C) >=-4 - <+4 ng/L   STAT Lactic Acid, Reflex    Collection Time: 04/30/23  9:31 PM    Specimen: Blood   Result Value Ref Range    Lactate 2.9 (C) 0.5 - 2.0 mmol/L   POC Glucose Once    Collection Time: 04/30/23 10:40 PM    Specimen: Blood   Result Value Ref Range    Glucose 290 (H) 70 - 130 mg/dL       Ordered the above labs and my independent interpretation of these results are discussed in the section labeled \"ED course\" below        RADIOLOGY  XR Chest 1 View    Result Date: 4/30/2023  XR CHEST 1 VW-clinical: Lethargy, are pain  COMPARISON examination 04/04/2023  FINDINGS: There are median sternotomy wires in position. The cardiomediastinal silhouette is stable. No effusion, edema or acute airspace disease is demonstrated.  CONCLUSION: No active disease of the chest  This report was finalized on " "4/30/2023 6:39 PM by Dr. Art Fabian M.D.        Ordered the above noted radiological studies.  Independently interpreted by me in PACS.  My independent interpretation of these results are discussed in the section below labeled \"ED course\"        PROCEDURES  Procedures          MEDICATIONS GIVEN IN ER  Medications   sodium chloride 0.9 % flush 10 mL (has no administration in time range)   sodium chloride 0.9 % infusion (125 mL/hr Intravenous Currently Infusing 4/30/23 2239)   levoFLOXacin (LEVAQUIN) 750 mg/150 mL D5W (premix) (LEVAQUIN) 750 mg (0 mg Intravenous Stopped 4/30/23 2122)   sodium chloride 0.9 % bolus 500 mL (0 mL Intravenous Stopped 4/30/23 2017)   LORazepam (ATIVAN) tablet 0.5 mg (0.5 mg Oral Given 4/30/23 2321)                   MEDICAL DECISION MAKING and INDEPENDENT INTERPRETATIONS      Everything documented below this statement is the \"ED course\" section which I have referred to above.  Everything discussed in the following section constitutes MY INDEPENDENT INTERPRETATIONS of the lab work, EKGs, and radiology studies, and all of my personal conversations with other providers, and all of my independent decision making regarding this patient are discussed below.      ED Course as of 04/30/23 2340   Sun Apr 30, 2023 1917 PCP listed as Dr. Mariah Hickman who belongs to Dr. Arnold service.    I spoke with Dr. Arnold who agrees to admit the patient to a telemetry bed for further.  She does have an indwelling Hoffman and appears to have UTI with mild sepsis.  She is not hemodynamically unstable so I will avoid the full 30/kg bolus at this time.  Multiple antibiotic allergies so Levaquin is first-line choice. [DP]   1917 EKG shows no obvious signs of ischemia, she is not having any active pain now but her troponin appears to be chronically elevated [DP]   2058 Other family members arrived and when they became aware that Dr. Arnold was the admitting physician for Dr. Hickman, they were very upset and " "asked that that be changed.  I notified Dr. Arnold, and will now page [facility] [DP]   2337 Mild leukocytosis, chemistry shows some chronic stable kidney disease compared to previous [DP]   2338 Lactate 2.4, urinalysis appears infected but it is a catheter specimen [DP]   2338 Troponin and proBNP are elevated, however they appear to be chronically elevated when looking at previous labs from her admission a month ago [DP]   2338 Respiratory viral panel is negative [DP]   2338 Chest x-ray shows no cardiomegaly or effusion [DP]   2339 EKG at 2139  Sinus rhythm in the 50, and is a poor quality tracing due to artifact.  The RI and QT are not significantly abnormal  There are nonspecific changes on the tracing and grossly they appear similar to most recent previous epic EKG from April 18 [DP]   2339 We will need to trend her troponins, but she is not having any active chest pain I do not suspect ACS [DP]   2340 The urine has been treated and we will follow the cultures.  She is hemodynamically stable and I do not believe she is systemically septic, therefore I do not see the benefit of a full 30/kg fluid bolus in this elderly female [DP]      ED Course User Index  [DP] Tomás Garnett MD         Everything documented above with this statement, in the ED course section constitutes my independent interpretation of lab work EKG and radiology studies along with my personal conversations with other providers and my independent medical decision making.  This statement will not be repeated before each data point, but they are all my independent interpretation needs contained within the \"ED course\" section.             All appropriate hygiene and PPE requirements were satisfied with this patient encounter      FINAL DIAGNOSES  Final diagnoses:   Acute UTI   Left arm pain           DISPOSITION  Admit          Latest Documented Vital Signs:  As of 23:40 EDT  BP- 172/67 HR- 52 Temp- 98.2 °F (36.8 °C) (Oral) O2 sat- 99%        --    Please " note that portions of this were completed with a voice recognition program.       Note Disclaimer: At [facility], we believe that sharing information builds trust and better relationships. You are receiving this note because you are receiving care at [facility] or recently visited. It is possible you will see health information before a provider has talked with you about it. This kind of information can be easy to misunderstand. To help you fully understand what it      Tomás Garnett MD  04/30/23 9877

## 2023-04-30 NOTE — ED NOTES
Nursing report ED to floor  Helena Carmen  89 y.o.  female    HPI :   Chief Complaint   Patient presents with    Weakness - Generalized    Arm Pain       Admitting doctor:   Mango Arnold MD    Admitting diagnosis:   The primary encounter diagnosis was Acute UTI. A diagnosis of Left arm pain was also pertinent to this visit.    Code status:   Current Code Status       Date Active Code Status Order ID Comments User Context       Prior            Allergies:   Baclofen, Erythromycin, Furosemide, Statins, Cephalexin, Penicillins, and Sulfa antibiotics    Intake and Output  No intake or output data in the 24 hours ending 04/30/23 1924    Weight:   There were no vitals filed for this visit.    Most recent vitals:   Vitals:    04/30/23 1903 04/30/23 1911 04/30/23 1912 04/30/23 1913   BP:       Pulse: 51 52 52 51   Resp:       Temp:       SpO2: 92% 92% 90% 92%       Active LDAs/IV Access:   Lines, Drains & Airways       Active LDAs       Name Placement date Placement time Site Days    Urethral Catheter Non-latex;Silicone 16 Fr. 03/10/23  2300  -- 50                    Labs (abnormal labs have a star):   Labs Reviewed   COMPREHENSIVE METABOLIC PANEL - Abnormal; Notable for the following components:       Result Value    Glucose 145 (*)     BUN 45 (*)     Creatinine 1.45 (*)     Sodium 129 (*)     Chloride 92 (*)     Albumin 3.3 (*)     ALT (SGPT) 39 (*)     AST (SGOT) 38 (*)     BUN/Creatinine Ratio 31.0 (*)     eGFR 33.9 (*)     All other components within normal limits    Narrative:     GFR Normal >60  Chronic Kidney Disease <60  Kidney Failure <15    The GFR formula is only valid for adults with stable renal function between ages 18 and 70.   URINALYSIS W/ CULTURE IF INDICATED - Abnormal; Notable for the following components:    Appearance, UA Cloudy (*)     Blood, UA Small (1+) (*)     Protein, UA >=300 mg/dL (3+) (*)     Leuk Esterase, UA Large (3+) (*)     All other components within normal limits    Narrative:     In  absence of clinical symptoms, the presence of pyuria, bacteria, and/or nitrites on the urinalysis result does not correlate with infection.   SINGLE HSTROPONIN T - Abnormal; Notable for the following components:    HS Troponin T 77 (*)     All other components within normal limits    Narrative:     High Sensitive Troponin T Reference Range:  <10.0 ng/L- Negative Female for AMI  <15.0 ng/L- Negative Male for AMI  >=10 - Abnormal Female indicating possible myocardial injury.  >=15 - Abnormal Male indicating possible myocardial injury.   Clinicians would have to utilize clinical acumen, EKG, Troponin, and serial changes to determine if it is an Acute Myocardial Infarction or myocardial injury due to an underlying chronic condition.        BNP (IN-HOUSE) - Abnormal; Notable for the following components:    proBNP 3,129.0 (*)     All other components within normal limits    Narrative:     Among patients with dyspnea, NT-proBNP is highly sensitive for the detection of acute congestive heart failure. In addition NT-proBNP of <300 pg/ml effectively rules out acute congestive heart failure with 99% negative predictive value.    Results may be falsely decreased if patient taking Biotin.     PROCALCITONIN - Abnormal; Notable for the following components:    Procalcitonin 0.33 (*)     All other components within normal limits    Narrative:     As a Marker for Sepsis (Non-Neonates):    1. <0.5 ng/mL represents a low risk of severe sepsis and/or septic shock.  2. >2 ng/mL represents a high risk of severe sepsis and/or septic shock.    As a Marker for Lower Respiratory Tract Infections that require antibiotic therapy:    PCT on Admission    Antibiotic Therapy       6-12 Hrs later    >0.5                Strongly Recommended  >0.25 - <0.5        Recommended   0.1 - 0.25          Discouraged              Remeasure/reassess PCT  <0.1                Strongly Discouraged     Remeasure/reassess PCT    As 28 day mortality risk marker:  "\"Change in Procalcitonin Result\" (>80% or <=80%) if Day 0 (or Day 1) and Day 4 values are available. Refer to http://[email or web address]-pct-[email or web address]    Change in PCT <=80%  A decrease of PCT levels below or equal to 80% defines a positive change in PCT test result representing a higher risk for 28-day all-cause mortality of patients diagnosed with severe sepsis for septic shock.    Change in PCT >80%  A decrease of PCT levels of more than 80% defines a negative change in PCT result representing a lower risk for 28-day all-cause mortality of patients diagnosed with severe sepsis or septic shock.      LACTIC ACID, PLASMA - Abnormal; Notable for the following components:    Lactate 2.4 (*)     All other components within normal limits   CBC WITH AUTO DIFFERENTIAL - Abnormal; Notable for the following components:    WBC 13.73 (*)     RDW 15.9 (*)     Platelets 59 (*)     Neutrophil % 92.6 (*)     Lymphocyte % 2.8 (*)     Monocyte % 4.0 (*)     Eosinophil % 0.1 (*)     Neutrophils, Absolute 12.72 (*)     Lymphocytes, Absolute 0.38 (*)     All other components within normal limits   URINALYSIS, MICROSCOPIC ONLY - Abnormal; Notable for the following components:    RBC, UA 3-5 (*)     WBC, UA Too Numerous to Count (*)     Bacteria, UA 4+ (*)     Squamous Epithelial Cells, UA 3-6 (*)     All other components within normal limits   RESPIRATORY PANEL PCR W/ COVID-19 (SARS-COV-2) LATONIA/CHARLIE/ALEJANDRO/PAD/COR/MAD/PASQUALE IN-HOUSE, NP [facility] IN [facility], 3-4 HR TAT - Normal    Narrative:     In the setting of a positive respiratory panel with a viral infection PLUS a negative procalcitonin without other underlying concern for bacterial infection, consider observing off antibiotics or discontinuation of antibiotics and continue supportive care. If the respiratory panel is positive for atypical bacterial infection (Bordetella pertussis, Chlamydophila pneumoniae, or Mycoplasma pneumoniae), consider antibiotic de-escalation to target atypical " bacterial infection.   BLOOD CULTURE   BLOOD CULTURE   URINE CULTURE   HIGH SENSITIVITIY TROPONIN T 2HR   LACTIC ACID, REFLEX   CBC AND DIFFERENTIAL    Narrative:     The following orders were created for panel order CBC & Differential.  Procedure                               Abnormality         Status                     ---------                               -----------         ------                     CBC Auto Differential[[id number]]        Abnormal            Final result                 Please view results for these tests on the individual orders.   EXTRA TUBES    Narrative:     The following orders were created for panel order Extra Tubes.  Procedure                               Abnormality         Status                     ---------                               -----------         ------                     Light Blue Top[[id number]]                                                                Please view results for these tests on the individual orders.   LIGHT BLUE TOP       EKG:   ECG 12 Lead Other; weakness   Preliminary Result   HEART RATE= 55  bpm   RR Interval= 1091  ms   CT Interval= 158  ms   P Horizontal Axis= 21  deg   P Front Axis= 67  deg   QRSD Interval= 109  ms   QT Interval= 440  ms   QRS Axis= 32  deg   T Wave Axis= 30  deg   - ABNORMAL ECG -   Sinus rhythm   ST depression, consider ischemia, lateral lds   Borderline ST elevation, anterior leads   Baseline wander in lead(s) II,aVR,V5   Electronically Signed By:    Date and Time of Study: 2023-04-30 18:15:33          Meds given in ED:   Medications   sodium chloride 0.9 % flush 10 mL (has no administration in time range)   levoFLOXacin (LEVAQUIN) 750 mg/150 mL D5W (premix) (LEVAQUIN) 750 mg (has no administration in time range)   sodium chloride 0.9 % bolus 500 mL (has no administration in time range)   sodium chloride 0.9 % infusion (has no administration in time range)       Imaging results:  No radiology results for the last  day    Ambulatory status:   - bed rest    Social issues:   Social History     Socioeconomic History    Marital status:    Tobacco Use    Smoking status: Former     Packs/day: 0.50     Types: Cigarettes     Quit date: 7/10/1969     Years since quittin.8    Smokeless tobacco: Never   Vaping Use    Vaping Use: Never used   Substance and Sexual Activity    Alcohol use: No     Comment: caffeine use - coffee 2 cups daily    Drug use: No    Sexual activity: Defer       NIH Stroke Scale:  Interval: baseline     Nursing report ED to floor:

## 2023-04-30 NOTE — Clinical Note
[facility] EMERGENCY DEPARTMENT  [address] [facility] [address]  Phone: [phone]    Margaret diop accompanied Helena Carmen to the emergency department on 4/30/2023. They may return to work on 05/01/2023.        Thank you for choosing [facility].    Tomás Garnett MD

## 2023-05-01 ENCOUNTER — APPOINTMENT (OUTPATIENT)
Dept: CT IMAGING | Facility: HOSPITAL | Age: 88
DRG: 698 | End: 2023-05-01
Payer: MEDICARE

## 2023-05-01 LAB
ALBUMIN SERPL-MCNC: 3.1 G/DL (ref 3.5–5.2)
ALBUMIN/GLOB SERPL: 1.2 G/DL
ALP SERPL-CCNC: 72 U/L (ref 39–117)
ALT SERPL W P-5'-P-CCNC: 50 U/L (ref 1–33)
ANION GAP SERPL CALCULATED.3IONS-SCNC: 11 MMOL/L (ref 5–15)
AST SERPL-CCNC: 52 U/L (ref 1–32)
BACTERIA BLD CULT: ABNORMAL
BASOPHILS # BLD AUTO: 0.01 10*3/MM3 (ref 0–0.2)
BASOPHILS NFR BLD AUTO: 0.1 % (ref 0–1.5)
BILIRUB SERPL-MCNC: 0.5 MG/DL (ref 0–1.2)
BOTTLE TYPE: ABNORMAL
BUN SERPL-MCNC: 44 MG/DL (ref 8–23)
BUN/CREAT SERPL: 28 (ref 7–25)
CALCIUM SPEC-SCNC: 8.3 MG/DL (ref 8.2–9.6)
CHLORIDE SERPL-SCNC: 98 MMOL/L (ref 98–107)
CO2 SERPL-SCNC: 23 MMOL/L (ref 22–29)
CREAT SERPL-MCNC: 1.57 MG/DL (ref 0.57–1)
D-LACTATE SERPL-SCNC: 1.8 MMOL/L (ref 0.5–2)
D-LACTATE SERPL-SCNC: 2.3 MMOL/L (ref 0.5–2)
D-LACTATE SERPL-SCNC: 2.4 MMOL/L (ref 0.5–2)
DEPRECATED RDW RBC AUTO: 52.4 FL (ref 37–54)
EGFRCR SERPLBLD CKD-EPI 2021: 30.8 ML/MIN/1.73
EOSINOPHIL # BLD AUTO: 0.01 10*3/MM3 (ref 0–0.4)
EOSINOPHIL NFR BLD AUTO: 0.1 % (ref 0.3–6.2)
ERYTHROCYTE [DISTWIDTH] IN BLOOD BY AUTOMATED COUNT: 16.2 % (ref 12.3–15.4)
GLOBULIN UR ELPH-MCNC: 2.6 GM/DL
GLUCOSE BLDC GLUCOMTR-MCNC: 166 MG/DL (ref 70–130)
GLUCOSE BLDC GLUCOMTR-MCNC: 239 MG/DL (ref 70–130)
GLUCOSE BLDC GLUCOMTR-MCNC: 251 MG/DL (ref 70–130)
GLUCOSE BLDC GLUCOMTR-MCNC: 272 MG/DL (ref 70–130)
GLUCOSE SERPL-MCNC: 144 MG/DL (ref 65–99)
HCT VFR BLD AUTO: 35.5 % (ref 34–46.6)
HGB BLD-MCNC: 11.6 G/DL (ref 12–15.9)
IMM GRANULOCYTES # BLD AUTO: 0.03 10*3/MM3 (ref 0–0.05)
IMM GRANULOCYTES NFR BLD AUTO: 0.3 % (ref 0–0.5)
LYMPHOCYTES # BLD AUTO: 0.43 10*3/MM3 (ref 0.7–3.1)
LYMPHOCYTES NFR BLD AUTO: 4.4 % (ref 19.6–45.3)
MCH RBC QN AUTO: 29.1 PG (ref 26.6–33)
MCHC RBC AUTO-ENTMCNC: 32.7 G/DL (ref 31.5–35.7)
MCV RBC AUTO: 89 FL (ref 79–97)
MONOCYTES # BLD AUTO: 0.74 10*3/MM3 (ref 0.1–0.9)
MONOCYTES NFR BLD AUTO: 7.6 % (ref 5–12)
NEUTROPHILS NFR BLD AUTO: 8.57 10*3/MM3 (ref 1.7–7)
NEUTROPHILS NFR BLD AUTO: 87.5 % (ref 42.7–76)
NRBC BLD AUTO-RTO: 0.1 /100 WBC (ref 0–0.2)
PLATELET # BLD AUTO: 51 10*3/MM3 (ref 140–450)
PMV BLD AUTO: 11.3 FL (ref 6–12)
POTASSIUM SERPL-SCNC: 4.2 MMOL/L (ref 3.5–5.2)
PROT SERPL-MCNC: 5.7 G/DL (ref 6–8.5)
RBC # BLD AUTO: 3.99 10*6/MM3 (ref 3.77–5.28)
SODIUM SERPL-SCNC: 132 MMOL/L (ref 136–145)
WBC NRBC COR # BLD: 9.79 10*3/MM3 (ref 3.4–10.8)

## 2023-05-01 PROCEDURE — 94799 UNLISTED PULMONARY SVC/PX: CPT

## 2023-05-01 PROCEDURE — 83605 ASSAY OF LACTIC ACID: CPT | Performed by: EMERGENCY MEDICINE

## 2023-05-01 PROCEDURE — 94640 AIRWAY INHALATION TREATMENT: CPT

## 2023-05-01 PROCEDURE — 94761 N-INVAS EAR/PLS OXIMETRY MLT: CPT

## 2023-05-01 PROCEDURE — 85025 COMPLETE CBC W/AUTO DIFF WBC: CPT | Performed by: INTERNAL MEDICINE

## 2023-05-01 PROCEDURE — 82948 REAGENT STRIP/BLOOD GLUCOSE: CPT

## 2023-05-01 PROCEDURE — 74176 CT ABD & PELVIS W/O CONTRAST: CPT

## 2023-05-01 PROCEDURE — 63710000001 INSULIN LISPRO (HUMAN) PER 5 UNITS: Performed by: HOSPITALIST

## 2023-05-01 PROCEDURE — 36415 COLL VENOUS BLD VENIPUNCTURE: CPT | Performed by: EMERGENCY MEDICINE

## 2023-05-01 PROCEDURE — 80053 COMPREHEN METABOLIC PANEL: CPT | Performed by: INTERNAL MEDICINE

## 2023-05-01 PROCEDURE — 99222 1ST HOSP IP/OBS MODERATE 55: CPT | Performed by: STUDENT IN AN ORGANIZED HEALTH CARE EDUCATION/TRAINING PROGRAM

## 2023-05-01 RX ORDER — TAMSULOSIN HYDROCHLORIDE 0.4 MG/1
0.4 CAPSULE ORAL NIGHTLY
Status: DISCONTINUED | OUTPATIENT
Start: 2023-05-01 | End: 2023-05-04 | Stop reason: HOSPADM

## 2023-05-01 RX ORDER — LORAZEPAM 0.5 MG/1
0.5 TABLET ORAL EVERY 8 HOURS PRN
Status: DISCONTINUED | OUTPATIENT
Start: 2023-05-01 | End: 2023-05-04 | Stop reason: HOSPADM

## 2023-05-01 RX ORDER — BUDESONIDE AND FORMOTEROL FUMARATE DIHYDRATE 160; 4.5 UG/1; UG/1
2 AEROSOL RESPIRATORY (INHALATION)
Status: DISCONTINUED | OUTPATIENT
Start: 2023-05-01 | End: 2023-05-04 | Stop reason: HOSPADM

## 2023-05-01 RX ORDER — MONTELUKAST SODIUM 10 MG/1
10 TABLET ORAL NIGHTLY
Status: DISCONTINUED | OUTPATIENT
Start: 2023-05-01 | End: 2023-05-04 | Stop reason: HOSPADM

## 2023-05-01 RX ORDER — LIDOCAINE 50 MG/G
1 PATCH TOPICAL EVERY 24 HOURS
Status: DISCONTINUED | OUTPATIENT
Start: 2023-05-01 | End: 2023-05-04 | Stop reason: HOSPADM

## 2023-05-01 RX ORDER — MAGNESIUM OXIDE 400 MG/1
200 TABLET ORAL DAILY
Status: DISCONTINUED | OUTPATIENT
Start: 2023-05-01 | End: 2023-05-04 | Stop reason: HOSPADM

## 2023-05-01 RX ORDER — NICOTINE POLACRILEX 4 MG
15 LOZENGE BUCCAL
Status: DISCONTINUED | OUTPATIENT
Start: 2023-05-01 | End: 2023-05-04 | Stop reason: HOSPADM

## 2023-05-01 RX ORDER — POLYETHYLENE GLYCOL 3350 17 G/17G
17 POWDER, FOR SOLUTION ORAL DAILY
Status: DISCONTINUED | OUTPATIENT
Start: 2023-05-01 | End: 2023-05-04 | Stop reason: HOSPADM

## 2023-05-01 RX ORDER — GABAPENTIN 300 MG/1
600 CAPSULE ORAL EVERY 12 HOURS SCHEDULED
Status: DISCONTINUED | OUTPATIENT
Start: 2023-05-01 | End: 2023-05-04 | Stop reason: HOSPADM

## 2023-05-01 RX ORDER — LEVOFLOXACIN 5 MG/ML
750 INJECTION, SOLUTION INTRAVENOUS
Status: DISCONTINUED | OUTPATIENT
Start: 2023-05-02 | End: 2023-05-04 | Stop reason: HOSPADM

## 2023-05-01 RX ORDER — HYDROCODONE BITARTRATE AND ACETAMINOPHEN 7.5; 325 MG/1; MG/1
1 TABLET ORAL EVERY 6 HOURS
Status: DISCONTINUED | OUTPATIENT
Start: 2023-05-01 | End: 2023-05-04 | Stop reason: HOSPADM

## 2023-05-01 RX ORDER — LEVOTHYROXINE SODIUM 0.05 MG/1
50 TABLET ORAL
Status: DISCONTINUED | OUTPATIENT
Start: 2023-05-01 | End: 2023-05-04 | Stop reason: HOSPADM

## 2023-05-01 RX ORDER — DEXTROSE MONOHYDRATE 25 G/50ML
25 INJECTION, SOLUTION INTRAVENOUS
Status: DISCONTINUED | OUTPATIENT
Start: 2023-05-01 | End: 2023-05-04 | Stop reason: HOSPADM

## 2023-05-01 RX ORDER — CARVEDILOL 3.12 MG/1
3.12 TABLET ORAL 2 TIMES DAILY
Status: DISCONTINUED | OUTPATIENT
Start: 2023-05-01 | End: 2023-05-04 | Stop reason: HOSPADM

## 2023-05-01 RX ORDER — HYDRALAZINE HYDROCHLORIDE 50 MG/1
50 TABLET, FILM COATED ORAL 2 TIMES DAILY
Status: DISCONTINUED | OUTPATIENT
Start: 2023-05-01 | End: 2023-05-03

## 2023-05-01 RX ORDER — CARVEDILOL 12.5 MG/1
12.5 TABLET ORAL 2 TIMES DAILY
Status: DISCONTINUED | OUTPATIENT
Start: 2023-05-01 | End: 2023-05-01

## 2023-05-01 RX ORDER — FAMOTIDINE 20 MG/1
40 TABLET, FILM COATED ORAL NIGHTLY
Status: DISCONTINUED | OUTPATIENT
Start: 2023-05-01 | End: 2023-05-04 | Stop reason: HOSPADM

## 2023-05-01 RX ORDER — CALCIUM CARBONATE 200(500)MG
2 TABLET,CHEWABLE ORAL 3 TIMES DAILY PRN
Status: DISCONTINUED | OUTPATIENT
Start: 2023-05-01 | End: 2023-05-04 | Stop reason: HOSPADM

## 2023-05-01 RX ORDER — MELATONIN
1000 DAILY
Status: DISCONTINUED | OUTPATIENT
Start: 2023-05-01 | End: 2023-05-04 | Stop reason: HOSPADM

## 2023-05-01 RX ORDER — FUROSEMIDE 40 MG/1
40 TABLET ORAL DAILY
Status: DISCONTINUED | OUTPATIENT
Start: 2023-05-01 | End: 2023-05-04 | Stop reason: HOSPADM

## 2023-05-01 RX ORDER — IBUPROFEN 600 MG/1
1 TABLET ORAL
Status: DISCONTINUED | OUTPATIENT
Start: 2023-05-01 | End: 2023-05-04 | Stop reason: HOSPADM

## 2023-05-01 RX ORDER — POLYETHYLENE GLYCOL 3350 17 G/17G
1 POWDER, FOR SOLUTION ORAL DAILY
Status: DISCONTINUED | OUTPATIENT
Start: 2023-05-01 | End: 2023-05-01

## 2023-05-01 RX ORDER — INSULIN LISPRO 100 [IU]/ML
2-7 INJECTION, SOLUTION INTRAVENOUS; SUBCUTANEOUS
Status: DISCONTINUED | OUTPATIENT
Start: 2023-05-01 | End: 2023-05-04 | Stop reason: HOSPADM

## 2023-05-01 RX ADMIN — FUROSEMIDE 40 MG: 40 TABLET ORAL at 08:48

## 2023-05-01 RX ADMIN — MAGNESIUM OXIDE 400 MG (241.3 MG MAGNESIUM) TABLET 200 MG: TABLET at 08:52

## 2023-05-01 RX ADMIN — TAMSULOSIN HYDROCHLORIDE 0.4 MG: 0.4 CAPSULE ORAL at 02:23

## 2023-05-01 RX ADMIN — POLYETHYLENE GLYCOL 3350 17 G: 17 POWDER, FOR SOLUTION ORAL at 08:52

## 2023-05-01 RX ADMIN — GABAPENTIN 600 MG: 300 CAPSULE ORAL at 08:54

## 2023-05-01 RX ADMIN — BUDESONIDE AND FORMOTEROL FUMARATE DIHYDRATE 2 PUFF: 160; 4.5 AEROSOL RESPIRATORY (INHALATION) at 08:23

## 2023-05-01 RX ADMIN — Medication 1000 UNITS: at 08:53

## 2023-05-01 RX ADMIN — HYDROCODONE BITARTRATE AND ACETAMINOPHEN 1 TABLET: 7.5; 325 TABLET ORAL at 13:45

## 2023-05-01 RX ADMIN — LIDOCAINE 1 PATCH: 140 PATCH CUTANEOUS at 02:23

## 2023-05-01 RX ADMIN — HYDROCODONE BITARTRATE AND ACETAMINOPHEN 1 TABLET: 7.5; 325 TABLET ORAL at 06:23

## 2023-05-01 RX ADMIN — HYDRALAZINE HYDROCHLORIDE 50 MG: 50 TABLET, FILM COATED ORAL at 08:52

## 2023-05-01 RX ADMIN — INSULIN GLARGINE-YFGN 20 UNITS: 100 INJECTION, SOLUTION SUBCUTANEOUS at 02:23

## 2023-05-01 RX ADMIN — FAMOTIDINE 40 MG: 20 TABLET, FILM COATED ORAL at 00:28

## 2023-05-01 RX ADMIN — MONTELUKAST SODIUM 10 MG: 10 TABLET, FILM COATED ORAL at 20:33

## 2023-05-01 RX ADMIN — BUDESONIDE AND FORMOTEROL FUMARATE DIHYDRATE 2 PUFF: 160; 4.5 AEROSOL RESPIRATORY (INHALATION) at 20:04

## 2023-05-01 RX ADMIN — INSULIN LISPRO 4 UNITS: 100 INJECTION, SOLUTION INTRAVENOUS; SUBCUTANEOUS at 19:36

## 2023-05-01 RX ADMIN — MONTELUKAST SODIUM 10 MG: 10 TABLET, FILM COATED ORAL at 02:23

## 2023-05-01 RX ADMIN — LEVOTHYROXINE SODIUM 50 MCG: 0.05 TABLET ORAL at 06:23

## 2023-05-01 RX ADMIN — CARVEDILOL 12.5 MG: 12.5 TABLET, FILM COATED ORAL at 02:23

## 2023-05-01 RX ADMIN — TAMSULOSIN HYDROCHLORIDE 0.4 MG: 0.4 CAPSULE ORAL at 20:33

## 2023-05-01 RX ADMIN — HYDROCODONE BITARTRATE AND ACETAMINOPHEN 1 TABLET: 7.5; 325 TABLET ORAL at 19:36

## 2023-05-01 RX ADMIN — HYDRALAZINE HYDROCHLORIDE 50 MG: 50 TABLET, FILM COATED ORAL at 20:33

## 2023-05-01 RX ADMIN — INSULIN GLARGINE-YFGN 20 UNITS: 100 INJECTION, SOLUTION SUBCUTANEOUS at 23:29

## 2023-05-01 RX ADMIN — HYDRALAZINE HYDROCHLORIDE 50 MG: 50 TABLET, FILM COATED ORAL at 02:23

## 2023-05-01 RX ADMIN — HYDROCODONE BITARTRATE AND ACETAMINOPHEN 1 TABLET: 7.5; 325 TABLET ORAL at 00:28

## 2023-05-01 RX ADMIN — GABAPENTIN 600 MG: 300 CAPSULE ORAL at 20:33

## 2023-05-01 RX ADMIN — FAMOTIDINE 40 MG: 20 TABLET, FILM COATED ORAL at 23:28

## 2023-05-01 RX ADMIN — GABAPENTIN 600 MG: 300 CAPSULE ORAL at 00:28

## 2023-05-01 NOTE — H&P
Patient Name:  Helena Carmen  YOB: 1931  MRN:  [id number]  Admit Date:  4/30/2023  Patient Care Team:  Mariah Hickman MD as PCP - General (Family Medicine)  Beth Butcher MD as Consulting Physician (Cardiology)  Rolando Wick MD as Consulting Physician (Nephrology)  Pablo Sherman Jr., MD as Consulting Physician (Hematology and Oncology)  Mango Arnold MD as Referring Physician (Internal Medicine)      Subjective   History Present Illness     Chief Complaint   Patient presents with   • Weakness - Generalized   • Arm Pain       History of Present Illness     Ms. Carmen is a 89 female with a complicated past medical history of type 2 diabetes mellitus, CKD, chronic urinary retention with chronic indwelling urinary bladder indwelling catheter, diastolic CHF, pulmonary hypertension, HLD, HTN, hypothyroidism, aortic valve stenosis, tricuspid and mitral valve insufficiency, PAF, ITP, chronic anemia, urine cancer, cognitive decline, and blindness who presents to [facility] today complaining of a hot and sweaty episode followed by feeling cold and clammy,with an associated headache  and feeling poorly today. She states this is normal for her when she has a UTI. Her daughter and grand daughter are at bedside and provide most information, They report she had had indwelling urinary bladder catheter after orthopedic surgery, it was placed by urology due to retention, and poor mobility.  They state that that urology did not want the Hoffman catheter removed until she was able to be up and move around and ambulate.  Family reports that she has been improving, being able to get up out of the bed with assistance and walking varying distances since being home.  The patient denies any chest pain, palpitations, shortness of breath, cough, lightheadedness, dizziness, abdominal pain, nausea, vomiting, diarrhea, or constipation.  She does report sacral wound, that is being taken care  of at home by family. They also report recent indwelling catheter change Thursday or Friday of last week.    Initial evaluation in the emergency department is remarkable for-elevated troponin 77, proBNP 83, 129, her lactate is also elevated 2.4, procalcitonin elevated 0.33,  She is leukocytosis with left shift WBC 13.73, neutrophils 92.6 she has hyponatremia 129,  Creatinine is elevated at 1.45/BUN 45 that appears to be near her baseline  chronic jimenes cath cultures pending, her UA as expected is negative for nitrates, she has 3+ leukocytes, too numerous to count WBCs, 4+ bacteria with 3-6 squamous PCR is negative for COVID-19, blood cultures are pending EKG shows some ST depression lateral leads borderline ST elevation anterior leads.  With heart rate of 55 she is followed by Dr. Butcher outpatient she just had echocardiogram on April 5 that showed normal LVSF, with EF of 50%, she has severe pulmonary hypertension,   Fever 99.4- Levaquin chest x-ray shows no acute cardio pulmonary process.     Patient will be admitted to the specialist group for further evaluation and treatment of acute urinary tract infection.    Review of Systems   Constitutional: Positive for chills and fatigue.   Eyes: Negative.    Respiratory: Negative.    Cardiovascular: Negative.    Gastrointestinal: Negative.    Genitourinary:        Chronic indwelling urinary catheter   Musculoskeletal: Positive for myalgias.   Skin: Negative.    Neurological: Positive for headaches.   Hematological: Negative.    Psychiatric/Behavioral: Negative.    All other systems reviewed and are negative.       Personal History     Past Medical History:   Diagnosis Date   • Aortic valve stenosis     s/p tissue AVR   • Back pain    • CKD (chronic kidney disease)    • Colitis due to Clostridioides difficile 01/26/2022   • Diastolic dysfunction     Grade 2 per echocardiogram 2013   • Diverticulosis    • Exertional shortness of breath    • Heart disease    • Hiatal  hernia    • Hyperlipidemia    • Hypertension    • Hyperthyroidism    • Hypertriglyceridemia 2018   • Hypothyroidism    • L5 compression fracture (HCC) 2022   • Left ventricular hypertrophy    • Legally blind    • Liver disease    • Low back pain    • Macular degeneration    • Mitral regurgitation    • Osteoarthritis of hip    • Osteoporosis    • Pancreatitis 2022   • Paroxysmal atrial fibrillation    • Peripheral neuropathy    • Premature ventricular contractions    • Pulmonary hypertension    • Renal insufficiency syndrome    • Type 2 diabetes mellitus    • Uterine cancer      Past Surgical History:   Procedure Laterality Date   • AORTIC VALVE REPAIR/REPLACEMENT     • CATARACT EXTRACTION      ,    • CHOLECYSTECTOMY     • ENDOSCOPY  08/15/2014    no gross lesions in stomach/duodenum, erythrematous mucosa in stomach   • EPIDURAL BLOCK     • HYSTERECTOMY     • STERNOTOMY       Family History   Problem Relation Age of Onset   • Heart disease Mother    • Hypertension Mother    • Stroke Mother    • Diabetes Mother         mellitus   • Other Other         cardiovascular disorder     Social History     Tobacco Use   • Smoking status: Former     Packs/day: 0.50     Types: Cigarettes     Quit date: 7/10/1969     Years since quittin.8   • Smokeless tobacco: Never   Vaping Use   • Vaping Use: Never used   Substance Use Topics   • Alcohol use: No     Comment: caffeine use - coffee 2 cups daily   • Drug use: No     No current facility-administered medications on file prior to encounter.     Current Outpatient Medications on File Prior to Encounter   Medication Sig Dispense Refill   • bismuth subsalicylate (PEPTO BISMOL) 262 MG/15ML suspension Take 30 mL by mouth Every 6 (Six) Hours As Needed for Indigestion.     • calcium carbonate (TUMS) 500 MG chewable tablet Chew 2 tablets 3 (Three) Times a Day As Needed for Indigestion or Heartburn.     • carvedilol (COREG) 12.5 MG tablet Take 1 tablet by  mouth 2 (Two) Times a Day. 1/2 tab in am and full tab in evening 60 tablet 11   • cholecalciferol (VITAMIN D3) 25 MCG (1000 UT) tablet Take 1 tablet by mouth Daily.     • famotidine (PEPCID) 40 MG tablet Take 1 tablet by mouth Every Night. 30 tablet 0   • fluticasone-salmeterol (Advair HFA) 115-21 MCG/ACT inhaler Inhale 2 puffs 2 (Two) Times a Day. 12 each 11   • furosemide (LASIX) 40 MG tablet Take 1 tablet by mouth Daily.     • gabapentin (NEURONTIN) 600 MG tablet Take 1 tablet by mouth 2 (Two) Times a Day. 6 tablet 0   • hydrALAZINE (APRESOLINE) 25 MG tablet Take 2 tablets by mouth 2 (Two) Times a Day.     • HYDROcodone-acetaminophen (NORCO) 7.5-325 MG per tablet Take 1 tablet by mouth Every 6 (Six) Hours. 12 tablet 0   • insulin glargine (LANTUS, SEMGLEE) 100 UNIT/ML injection Inject 20 Units under the skin into the appropriate area as directed Every Night.     • insulin lispro (ADMELOG) 100 UNIT/ML injection Inject 0-9 Units under the skin into the appropriate area as directed 3 (Three) Times a Day Before Meals.  12   • ISOtretinoin (Claravis) 10 MG capsule Take 1 capsule by mouth 2 (Two) Times a Day.     • levothyroxine (SYNTHROID, LEVOTHROID) 50 MCG tablet Take 1 tablet by mouth Every Morning.     • lidocaine (LIDODERM) 5 % Place 1 patch on the skin as directed by provider Daily. Remove & Discard patch within 12 hours or as directed by MD 6 each 0   • LORazepam (ATIVAN) 0.5 MG tablet Take 1 tablet by mouth Every 8 (Eight) Hours As Needed for Anxiety. 9 tablet 0   • magnesium oxide (MAG-OX) 400 MG tablet Take 0.5 tablets by mouth Daily. 45 tablet 3   • montelukast (SINGULAIR) 10 MG tablet Take 1 tablet by mouth Every Night.     • ondansetron (ZOFRAN) 8 MG tablet Take 1 tablet by mouth As Needed.     • polyethylene glycol (MIRALAX) 17 GM/SCOOP powder Take 17 g by mouth Daily.     • predniSONE (DELTASONE) 10 MG tablet Take 1 tablet by mouth Daily. 30 tablet 2   • tamsulosin (FLOMAX) 0.4 MG capsule 24 hr capsule  Take 1 capsule by mouth every night at bedtime.       Allergies   Allergen Reactions   • Baclofen Unknown - Low Severity     Yeast infection   • Erythromycin Unknown (See Comments) and Other (See Comments)     Pt states she does not remember but it was many years ago  Pt states she does not remember but it was many years ago   • Furosemide Other (See Comments) and Unknown (See Comments)     Other reaction(s): Other: PER PT REP KIDNEY ISSUES, Other  Other reaction(s): Other: PER PT REP KIDNEY ISSUES, Other     • Statins Myalgia   • Cephalexin Other (See Comments) and Unknown (See Comments)     June 2022 Pt has tolerated ceftriaxone and cefepime during admission.   Pt states she does not remember reaction but it was many years ago   • Penicillins Rash   • Sulfa Antibiotics Itching and Rash       Objective    Objective     Vital Signs  Temp:  [99.4 °F (37.4 °C)] 99.4 °F (37.4 °C)  Heart Rate:  [50-59] 50  Resp:  [17] 17  BP: (130-157)/(57-63) 157/57  SpO2:  [90 %-100 %] 94 %  on  Flow (L/min):  [2] 2;   Device (Oxygen Therapy): nasal cannula  There is no height or weight on file to calculate BMI.    Physical Exam  Nursing note reviewed.   Constitutional:       General: She is awake.      Appearance: She is obese. She is ill-appearing.      Interventions: Nasal cannula in place.   Cardiovascular:      Rate and Rhythm: Normal rate and regular rhythm.   Neurological:      Mental Status: She is alert.   Psychiatric:         Behavior: Behavior is cooperative.         Results Review:  I reviewed the patient's new clinical results.  I reviewed the patient's new imaging results and agree with the interpretation.  I reviewed the patient's other test results and agree with the interpretation  I personally viewed and interpreted the patient's EKG/Telemetry data  Discussed with ED provider.    Lab Results (last 24 hours)     Procedure Component Value Units Date/Time    CBC & Differential [[id number]]  (Abnormal) Collected: 04/30/23  1806    Specimen: Blood Updated: 04/30/23 1844    Narrative:      The following orders were created for panel order CBC & Differential.  Procedure                               Abnormality         Status                     ---------                               -----------         ------                     CBC Auto Differential[[id number]]        Abnormal            Final result                 Please view results for these tests on the individual orders.    Comprehensive Metabolic Panel [[id number]]  (Abnormal) Collected: 04/30/23 1806    Specimen: Blood Updated: 04/30/23 1854     Glucose 145 mg/dL      BUN 45 mg/dL      Creatinine 1.45 mg/dL      Sodium 129 mmol/L      Potassium 4.9 mmol/L      Chloride 92 mmol/L      CO2 27.9 mmol/L      Calcium 9.1 mg/dL      Total Protein 6.4 g/dL      Albumin 3.3 g/dL      ALT (SGPT) 39 U/L      AST (SGOT) 38 U/L      Alkaline Phosphatase 66 U/L      Total Bilirubin 0.5 mg/dL      Globulin 3.1 gm/dL      A/G Ratio 1.1 g/dL      BUN/Creatinine Ratio 31.0     Anion Gap 9.1 mmol/L      eGFR 33.9 mL/min/1.73     Narrative:      GFR Normal >60  Chronic Kidney Disease <60  Kidney Failure <15    The GFR formula is only valid for adults with stable renal function between ages 18 and 70.    Single High Sensitivity Troponin T [[id number]]  (Abnormal) Collected: 04/30/23 1806    Specimen: Blood Updated: 04/30/23 1901     HS Troponin T 77 ng/L     Narrative:      High Sensitive Troponin T Reference Range:  <10.0 ng/L- Negative Female for AMI  <15.0 ng/L- Negative Male for AMI  >=10 - Abnormal Female indicating possible myocardial injury.  >=15 - Abnormal Male indicating possible myocardial injury.   Clinicians would have to utilize clinical acumen, EKG, Troponin, and serial changes to determine if it is an Acute Myocardial Infarction or myocardial injury due to an underlying chronic condition.         BNP [[id number]]  (Abnormal) Collected: 04/30/23 1806    Specimen: Blood Updated:  "04/30/23 1900     proBNP 3,129.0 pg/mL     Narrative:      Among patients with dyspnea, NT-proBNP is highly sensitive for the detection of acute congestive heart failure. In addition NT-proBNP of <300 pg/ml effectively rules out acute congestive heart failure with 99% negative predictive value.    Results may be falsely decreased if patient taking Biotin.      Procalcitonin [[id number]]  (Abnormal) Collected: 04/30/23 1806    Specimen: Blood Updated: 04/30/23 1900     Procalcitonin 0.33 ng/mL     Narrative:      As a Marker for Sepsis (Non-Neonates):    1. <0.5 ng/mL represents a low risk of severe sepsis and/or septic shock.  2. >2 ng/mL represents a high risk of severe sepsis and/or septic shock.    As a Marker for Lower Respiratory Tract Infections that require antibiotic therapy:    PCT on Admission    Antibiotic Therapy       6-12 Hrs later    >0.5                Strongly Recommended  >0.25 - <0.5        Recommended   0.1 - 0.25          Discouraged              Remeasure/reassess PCT  <0.1                Strongly Discouraged     Remeasure/reassess PCT    As 28 day mortality risk marker: \"Change in Procalcitonin Result\" (>80% or <=80%) if Day 0 (or Day 1) and Day 4 values are available. Refer to http://[email or web address]-[email or web address]-[email or web address]    Change in PCT <=80%  A decrease of PCT levels below or equal to 80% defines a positive change in PCT test result representing a higher risk for 28-day all-cause mortality of patients diagnosed with severe sepsis for septic shock.    Change in PCT >80%  A decrease of PCT levels of more than 80% defines a negative change in PCT result representing a lower risk for 28-day all-cause mortality of patients diagnosed with severe sepsis or septic shock.       Lactic Acid, Plasma [[id number]]  (Abnormal) Collected: 04/30/23 1806    Specimen: Blood Updated: 04/30/23 1902     Lactate 2.4 mmol/L     Blood Culture - Blood, Arm, Left [[id number]] Collected: 04/30/23 1806    Specimen: Blood from " Arm, Left Updated: 04/30/23 1825    CBC Auto Differential [[id number]]  (Abnormal) Collected: 04/30/23 1806    Specimen: Blood Updated: 04/30/23 1844     WBC 13.73 10*3/mm3      RBC 4.17 10*6/mm3      Hemoglobin 12.3 g/dL      Hematocrit 36.8 %      MCV 88.2 fL      MCH 29.5 pg      MCHC 33.4 g/dL      RDW 15.9 %      RDW-SD 51.3 fl      MPV 12.0 fL      Platelets 59 10*3/mm3      Neutrophil % 92.6 %      Lymphocyte % 2.8 %      Monocyte % 4.0 %      Eosinophil % 0.1 %      Basophil % 0.1 %      Neutrophils, Absolute 12.72 10*3/mm3      Lymphocytes, Absolute 0.38 10*3/mm3      Monocytes, Absolute 0.55 10*3/mm3      Eosinophils, Absolute 0.01 10*3/mm3      Basophils, Absolute 0.02 10*3/mm3     Respiratory Panel PCR w/COVID-19(SARS-CoV-2) LATONIA/CHARLIE/ALEJANDRO/PAD/COR/MAD/PASQUALE In-House, NP Swab in UTM/VTM, 3-4 HR TAT - Swab, Nasopharynx [[id number]]  (Normal) Collected: 04/30/23 1807    Specimen: Swab from Nasopharynx Updated: 04/30/23 1919     ADENOVIRUS, PCR Not Detected     Coronavirus 229E Not Detected     Coronavirus HKU1 Not Detected     Coronavirus NL63 Not Detected     Coronavirus OC43 Not Detected     COVID19 Not Detected     Human Metapneumovirus Not Detected     Human Rhinovirus/Enterovirus Not Detected     Influenza A PCR Not Detected     Influenza B PCR Not Detected     Parainfluenza Virus 1 Not Detected     Parainfluenza Virus 2 Not Detected     Parainfluenza Virus 3 Not Detected     Parainfluenza Virus 4 Not Detected     RSV, PCR Not Detected     Bordetella pertussis pcr Not Detected     Bordetella parapertussis PCR Not Detected     Chlamydophila pneumoniae PCR Not Detected     Mycoplasma pneumo by PCR Not Detected    Narrative:      In the setting of a positive respiratory panel with a viral infection PLUS a negative procalcitonin without other underlying concern for bacterial infection, consider observing off antibiotics or discontinuation of antibiotics and continue supportive care. If the respiratory panel is  positive for atypical bacterial infection (Bordetella pertussis, Chlamydophila pneumoniae, or Mycoplasma pneumoniae), consider antibiotic de-escalation to target atypical bacterial infection.    Urinalysis With Culture If Indicated - Urine, Catheter [[id number]]  (Abnormal) Collected: 04/30/23 1807    Specimen: Urine, Catheter Updated: 04/30/23 1831     Color, UA Yellow     Appearance, UA Cloudy     pH, UA 7.0     Specific Gravity, UA 1.009     Glucose, UA Negative     Ketones, UA Negative     Bilirubin, UA Negative     Blood, UA Small (1+)     Protein, UA >=300 mg/dL (3+)     Leuk Esterase, UA Large (3+)     Nitrite, UA Negative     Urobilinogen, UA 0.2 E.U./dL    Narrative:      In absence of clinical symptoms, the presence of pyuria, bacteria, and/or nitrites on the urinalysis result does not correlate with infection.    Urinalysis, Microscopic Only - Urine, Catheter [[id number]]  (Abnormal) Collected: 04/30/23 1807    Specimen: Urine, Catheter Updated: 04/30/23 1833     RBC, UA 3-5 /HPF      WBC, UA Too Numerous to Count /HPF      Bacteria, UA 4+ /HPF      Squamous Epithelial Cells, UA 3-6 /HPF      Hyaline Casts, UA 0-2 /LPF      Methodology Automated Microscopy    Urine Culture - Urine, Urine, Catheter [[id number]] Collected: 04/30/23 1807    Specimen: Urine, Catheter Updated: 04/30/23 1833    Blood Culture - Blood, Arm, Left [[id number]] Collected: 04/30/23 1858    Specimen: Blood from Arm, Left Updated: 04/30/23 1905          Imaging Results (Last 24 Hours)     Procedure Component Value Units Date/Time    XR Chest 1 View [[id number]] Collected: 04/30/23 1838     Updated: 04/30/23 1842    Narrative:      XR CHEST 1 VW-clinical: Lethargy, are pain     COMPARISON examination 04/04/2023     FINDINGS: There are median sternotomy wires in position. The  cardiomediastinal silhouette is stable. No effusion, edema or acute  airspace disease is demonstrated.     CONCLUSION: No active disease of the chest     This report  was finalized on 4/30/2023 6:39 PM by Dr. Art Fabian M.D.             Results for orders placed during the hospital encounter of 04/04/23    Adult Transthoracic Echo Complete W/ Cont if Necessary Per Protocol    Interpretation Summary  •  Left ventricular systolic function is normal. Estimated left ventricular EF = 50%  •  Left atrial volume is moderately increased.  •  There is a bioprosthetic aortic valve present.  •  There is mild, bileaflet mitral valve thickening present.  •  Calculated right ventricular systolic pressure from tricuspid regurgitation is 75 mmHg.  •  Severe pulmonary hypertension is present.  •  The aortic valve peak and mean gradients are within defined limits.      ECG 12 Lead Other; weakness   Preliminary Result   HEART RATE= 55  bpm   RR Interval= 1091  ms   [place] Interval= 158  ms   P Horizontal Axis= 21  deg   P Front Axis= 67  deg   QRSD Interval= 109  ms   QT Interval= 440  ms   QRS Axis= 32  deg   T Wave Axis= 30  deg   - ABNORMAL ECG -   Sinus rhythm   ST depression, consider ischemia, lateral lds   Borderline ST elevation, anterior leads   Baseline wander in lead(s) II,aVR,V5   Electronically Signed By:    Date and Time of Study: 2023-04-30 18:15:33           Assessment/Plan     Active Hospital Problems    Diagnosis  POA   • **Acute UTI [N39.0]  Yes   • Diabetic polyneuropathy associated with type 2 diabetes mellitus [E11.42]  Yes   • Anemia, chronic disease [D63.8]  Yes   • Other cirrhosis of liver [K74.69]  Yes   • Chronic heart failure with preserved ejection fraction [I50.32]  Yes   • Type 2 diabetes mellitus with hyperglycemia [E11.65]  Yes   • Gastroparesis [K31.84]  Yes   • Pulmonary hypertension [I27.20]  Yes   • Paroxysmal atrial fibrillation [I48.0]  Yes   • Legally blind [H54.8]  Yes   • Essential hypertension [I10]  Yes   • Stage 4 chronic kidney disease [N18.4]  Yes      Resolved Hospital Problems   No resolved problems to display.   ·       Acute  UTI  Leukocytosis  Elevated lactic acid 0.4  Urine cultures pending   Chronic indwelling catheter placed by urology   Continue Levaquin-best choice with antibiotic allergies  Monitor I's and O's  Continue Hoffman catheter with catheter care ordered  Lactic 2.4, trending up to 2.9-fluid resuscitation initiated in ED    Elevated troponin   77 at time of admission recheck 87 with a delta of 10  Is likely secondary to renal function  With patient's symptoms and known cardiac history cardiology consulted for opinion    Pulmonary hypertension  Chronic heart failure admitted for ejection fraction  Chronic  We will hold continuing IV fluid rehydration  Daily weights  Monitor I's and O's  Continue home Coreg, and furosemide  Monitor electrolytes and replace as needed  We will hold off continuing IV fluids secondary to CHF, and CKD  [id number]  at time of  admission    Stage IV chronic kidney disease  Chronic  Creatinine 1.45, slightly elevated from 1.40 on 04/07  Daily weights  Monitor I's and O's  Avoid nephrotoxic medications    Hyponatremia  Sodium 129 at time of admissio appears she is chronically low was 132 on April 7  Monitor for now    Proximal atrial fibrillation  Chronic  Need to repeat EKG, to be sinus bradycardia heart rate 55 but very poor tracing  Patient not on RC or AC     Type 2 diabetes mellitus with hyperglycemia complicated by polyneuropathy and gastroparesis  Chronic  Poorly controlled  Accu-Cheks ACHS  SSI ordered for hyperglycemia  Hold home SSI  Continue home Lantus  Hypoglycemia treatment per protocol  Continue home gabapentin    Legally blind  Chronic  Safety precautions        · I discussed the patient's findings and my recommendations with patient and family.    VTE Prophylaxis - SCDs.  Code Status - DNR.       RIGO Jackson  [facility]  04/30/23  21:33 EDT

## 2023-05-01 NOTE — PROGRESS NOTES
"DAILY PROGRESS NOTE  [facility]    Patient Identification:  Name: Helena Carmen  Age: 89 y.o.  Sex: female  :  1931  MRN: [id number]         Primary Care Physician: Mariah Hickman MD    Subjective:  Interval History:She is weak.    Objective:    Scheduled Meds:budesonide-formoterol, 2 puff, Inhalation, BID - RT  carvedilol, 12.5 mg, Oral, BID  cholecalciferol, 1,000 Units, Oral, Daily  famotidine, 40 mg, Oral, Nightly  furosemide, 40 mg, Oral, Daily  gabapentin, 600 mg, Oral, Q12H  hydrALAZINE, 50 mg, Oral, BID  HYDROcodone-acetaminophen, 1 tablet, Oral, Q6H  insulin glargine, 20 Units, Subcutaneous, Nightly  [START ON 2023] levoFLOXacin, 750 mg, Intravenous, Q48H  levothyroxine, 50 mcg, Oral, Q AM  lidocaine, 1 patch, Transdermal, Q24H  magnesium oxide, 200 mg, Oral, Daily  montelukast, 10 mg, Oral, Nightly  polyethylene glycol, 17 g, Oral, Daily  tamsulosin, 0.4 mg, Oral, Nightly      Continuous Infusions:     Vital signs in last 24 hours:  Temp:  [97.7 °F (36.5 °C)-100.2 °F (37.9 °C)] 97.7 °F (36.5 °C)  Heart Rate:  [44-63] 44  Resp:  [16-18] 18  BP: (119-192)/(44-67) 141/45    Intake/Output:    Intake/Output Summary (Last 24 hours) at 2023 1454  Last data filed at 2023 0640  Gross per 24 hour   Intake 890 ml   Output 750 ml   Net 140 ml       Exam:  /45 (BP Location: Right arm, Patient Position: Lying)   Pulse (!) 44   Temp 97.7 °F (36.5 °C) (Oral)   Resp 18   Ht 144.8 cm (57\")   Wt 81 kg (178 lb 9.2 oz)   SpO2 92%   BMI 38.64 kg/m²     General Appearance:    Alert, cooperative, no distress   Head:    Normocephalic, without obvious abnormality, atraumatic   Eyes:       Throat:   Lips, tongue, gums normal   Neck:   Supple, symmetrical, trachea midline, no JVD   Lungs:     Clear to auscultation bilaterally, respirations unlabored   Chest Wall:    No tenderness or deformity    Heart:    Regular rate and rhythm, S1 and S2 normal, no murmur,no  Rub or gallop "   Abdomen:     Soft, nontender, bowel sounds active, no masses, no organomegaly    Extremities:   Extremities normal, atraumatic, no cyanosis or edema   Pulses:      Skin:   Skin is warm and dry,  no rashes or palpable lesions   Neurologic:   She is weak.      Lab Results (last 72 hours)     Procedure Component Value Units Date/Time    POC Glucose Once [[id number]]  (Abnormal) Collected: 05/01/23 1121    Specimen: Blood Updated: 05/01/23 1126     Glucose 272 mg/dL      Comment: Meter: [id number] : [id number] Raza Carty CNA       Blood Culture - Blood, Arm, Left [[id number]]  (Abnormal) Collected: 04/30/23 1858    Specimen: Blood from Arm, Left Updated: 05/01/23 1059     Blood Culture Abnormal Stain     Gram Stain Aerobic Bottle Gram negative bacilli    STAT Lactic Acid, Reflex [[id number]]  (Normal) Collected: 05/01/23 1001    Specimen: Blood Updated: 05/01/23 1037     Lactate 1.8 mmol/L     Urine Culture - Urine, Urine, Catheter [[id number]]  (Abnormal) Collected: 04/30/23 1807    Specimen: Urine, Catheter Updated: 05/01/23 1033     Urine Culture >100,000 CFU/mL Gram Negative Bacilli    Narrative:      Colonization of the urinary tract without infection is common. Treatment is discouraged unless the patient is symptomatic, pregnant, or undergoing an invasive urologic procedure.    Blood Culture ID, PCR - Blood, Arm, Left [[id number]]  (Abnormal) Collected: 04/30/23 1806    Specimen: Blood from Arm, Left Updated: 05/01/23 0942     BCID, PCR Escherichia coli. Identification by BCID2 PCR.     BOTTLE TYPE Anaerobic Bottle    Narrative:      No resistance genes detected.    POC Glucose Once [[id number]]  (Abnormal) Collected: 05/01/23 0628    Specimen: Blood Updated: 05/01/23 0629     Glucose 166 mg/dL      Comment: Meter: [id number] : [id number] Camilo JOHNSON       Comprehensive Metabolic Panel [[id number]]  (Abnormal) Collected: 05/01/23 0525    Specimen: Blood Updated: 05/01/23 0623     Glucose 144 mg/dL       BUN 44 mg/dL      Creatinine 1.57 mg/dL      Sodium 132 mmol/L      Potassium 4.2 mmol/L      Chloride 98 mmol/L      CO2 23.0 mmol/L      Calcium 8.3 mg/dL      Total Protein 5.7 g/dL      Albumin 3.1 g/dL      ALT (SGPT) 50 U/L      AST (SGOT) 52 U/L      Alkaline Phosphatase 72 U/L      Total Bilirubin 0.5 mg/dL      Globulin 2.6 gm/dL      A/G Ratio 1.2 g/dL      BUN/Creatinine Ratio 28.0     Anion Gap 11.0 mmol/L      eGFR 30.8 mL/min/1.73     Narrative:      GFR Normal >60  Chronic Kidney Disease <60  Kidney Failure <15    The GFR formula is only valid for adults with stable renal function between ages 18 and 70.    Blood Culture - Blood, Arm, Left [[id number]]  (Abnormal) Collected: 04/30/23 1806    Specimen: Blood from Arm, Left Updated: 05/01/23 0617     Blood Culture Abnormal Stain     Gram Stain Anaerobic Bottle Gram negative bacilli    CBC & Differential [[id number]]  (Abnormal) Collected: 05/01/23 0525    Specimen: Blood Updated: 05/01/23 0614    Narrative:      The following orders were created for panel order CBC & Differential.  Procedure                               Abnormality         Status                     ---------                               -----------         ------                     CBC Auto Differential[[id number]]        Abnormal            Final result                 Please view results for these tests on the individual orders.    CBC Auto Differential [[id number]]  (Abnormal) Collected: 05/01/23 0525    Specimen: Blood Updated: 05/01/23 0614     WBC 9.79 10*3/mm3      RBC 3.99 10*6/mm3      Hemoglobin 11.6 g/dL      Hematocrit 35.5 %      MCV 89.0 fL      MCH 29.1 pg      MCHC 32.7 g/dL      RDW 16.2 %      RDW-SD 52.4 fl      MPV 11.3 fL      Platelets 51 10*3/mm3      Neutrophil % 87.5 %      Lymphocyte % 4.4 %      Monocyte % 7.6 %      Eosinophil % 0.1 %      Basophil % 0.1 %      Immature Grans % 0.3 %      Neutrophils, Absolute 8.57 10*3/mm3      Lymphocytes, Absolute 0.43  10*3/mm3      Monocytes, Absolute 0.74 10*3/mm3      Eosinophils, Absolute 0.01 10*3/mm3      Basophils, Absolute 0.01 10*3/mm3      Immature Grans, Absolute 0.03 10*3/mm3      nRBC 0.1 /100 WBC     STAT Lactic Acid, Reflex [[id number]]  (Abnormal) Collected: 05/01/23 0320    Specimen: Blood Updated: 05/01/23 0404     Lactate 2.4 mmol/L     STAT Lactic Acid, Reflex [[id number]]  (Abnormal) Collected: 05/01/23 0016    Specimen: Blood Updated: 05/01/23 0111     Lactate 2.3 mmol/L     POC Glucose Once [[id number]]  (Abnormal) Collected: 04/30/23 2240    Specimen: Blood Updated: 04/30/23 2241     Glucose 290 mg/dL      Comment: Meter: [id number] : [id number] Arely Ramos RN       High Sensitivity Troponin T 2Hr [[id number]]  (Abnormal) Collected: 04/30/23 2131    Specimen: Blood Updated: 04/30/23 2214     HS Troponin T 87 ng/L      Troponin T Delta 10 ng/L     Narrative:      High Sensitive Troponin T Reference Range:  <10.0 ng/L- Negative Female for AMI  <15.0 ng/L- Negative Male for AMI  >=10 - Abnormal Female indicating possible myocardial injury.  >=15 - Abnormal Male indicating possible myocardial injury.   Clinicians would have to utilize clinical acumen, EKG, Troponin, and serial changes to determine if it is an Acute Myocardial Infarction or myocardial injury due to an underlying chronic condition.         STAT Lactic Acid, Reflex [[id number]]  (Abnormal) Collected: 04/30/23 2131    Specimen: Blood Updated: 04/30/23 2213     Lactate 2.9 mmol/L     Respiratory Panel PCR w/COVID-19(SARS-CoV-2) LATONIA/CHARLIE/ALEJANDRO/PAD/COR/MAD/PASQUALE In-House, NP Swab in [facility], 3-4 HR TAT - Swab, Nasopharynx [[id number]]  (Normal) Collected: 04/30/23 1807    Specimen: Swab from Nasopharynx Updated: 04/30/23 1919     ADENOVIRUS, PCR Not Detected     Coronavirus 229E Not Detected     Coronavirus HKU1 Not Detected     Coronavirus NL63 Not Detected     Coronavirus OC43 Not Detected     COVID19 Not Detected     Human Metapneumovirus Not Detected      Human Rhinovirus/Enterovirus Not Detected     Influenza A PCR Not Detected     Influenza B PCR Not Detected     Parainfluenza Virus 1 Not Detected     Parainfluenza Virus 2 Not Detected     Parainfluenza Virus 3 Not Detected     Parainfluenza Virus 4 Not Detected     RSV, PCR Not Detected     Bordetella pertussis pcr Not Detected     Bordetella parapertussis PCR Not Detected     Chlamydophila pneumoniae PCR Not Detected     Mycoplasma pneumo by PCR Not Detected    Narrative:      In the setting of a positive respiratory panel with a viral infection PLUS a negative procalcitonin without other underlying concern for bacterial infection, consider observing off antibiotics or discontinuation of antibiotics and continue supportive care. If the respiratory panel is positive for atypical bacterial infection (Bordetella pertussis, Chlamydophila pneumoniae, or Mycoplasma pneumoniae), consider antibiotic de-escalation to target atypical bacterial infection.    Lactic Acid, Plasma [[id number]]  (Abnormal) Collected: 04/30/23 1806    Specimen: Blood Updated: 04/30/23 1902     Lactate 2.4 mmol/L     Single High Sensitivity Troponin T [[id number]]  (Abnormal) Collected: 04/30/23 1806    Specimen: Blood Updated: 04/30/23 1901     HS Troponin T 77 ng/L     Narrative:      High Sensitive Troponin T Reference Range:  <10.0 ng/L- Negative Female for AMI  <15.0 ng/L- Negative Male for AMI  >=10 - Abnormal Female indicating possible myocardial injury.  >=15 - Abnormal Male indicating possible myocardial injury.   Clinicians would have to utilize clinical acumen, EKG, Troponin, and serial changes to determine if it is an Acute Myocardial Infarction or myocardial injury due to an underlying chronic condition.         BNP [[id number]]  (Abnormal) Collected: 04/30/23 1806    Specimen: Blood Updated: 04/30/23 1900     proBNP 3,129.0 pg/mL     Narrative:      Among patients with dyspnea, NT-proBNP is highly sensitive for the detection of  "acute congestive heart failure. In addition NT-proBNP of <300 pg/ml effectively rules out acute congestive heart failure with 99% negative predictive value.    Results may be falsely decreased if patient taking Biotin.      Procalcitonin [[id number]]  (Abnormal) Collected: 04/30/23 1806    Specimen: Blood Updated: 04/30/23 1900     Procalcitonin 0.33 ng/mL     Narrative:      As a Marker for Sepsis (Non-Neonates):    1. <0.5 ng/mL represents a low risk of severe sepsis and/or septic shock.  2. >2 ng/mL represents a high risk of severe sepsis and/or septic shock.    As a Marker for Lower Respiratory Tract Infections that require antibiotic therapy:    PCT on Admission    Antibiotic Therapy       6-12 Hrs later    >0.5                Strongly Recommended  >0.25 - <0.5        Recommended   0.1 - 0.25          Discouraged              Remeasure/reassess PCT  <0.1                Strongly Discouraged     Remeasure/reassess PCT    As 28 day mortality risk marker: \"Change in Procalcitonin Result\" (>80% or <=80%) if Day 0 (or Day 1) and Day 4 values are available. Refer to http://www.[employer]-pct-calculator.com    Change in PCT <=80%  A decrease of PCT levels below or equal to 80% defines a positive change in PCT test result representing a higher risk for 28-day all-cause mortality of patients diagnosed with severe sepsis for septic shock.    Change in PCT >80%  A decrease of PCT levels of more than 80% defines a negative change in PCT result representing a lower risk for 28-day all-cause mortality of patients diagnosed with severe sepsis or septic shock.       Comprehensive Metabolic Panel [[id number]]  (Abnormal) Collected: 04/30/23 1806    Specimen: Blood Updated: 04/30/23 1854     Glucose 145 mg/dL      BUN 45 mg/dL      Creatinine 1.45 mg/dL      Sodium 129 mmol/L      Potassium 4.9 mmol/L      Chloride 92 mmol/L      CO2 27.9 mmol/L      Calcium 9.1 mg/dL      Total Protein 6.4 g/dL      Albumin 3.3 g/dL      ALT (SGPT) 39 " U/L      AST (SGOT) 38 U/L      Alkaline Phosphatase 66 U/L      Total Bilirubin 0.5 mg/dL      Globulin 3.1 gm/dL      A/G Ratio 1.1 g/dL      BUN/Creatinine Ratio 31.0     Anion Gap 9.1 mmol/L      eGFR 33.9 mL/min/1.73     Narrative:      GFR Normal >60  Chronic Kidney Disease <60  Kidney Failure <15    The GFR formula is only valid for adults with stable renal function between ages 18 and 70.    CBC & Differential [[id number]]  (Abnormal) Collected: 04/30/23 1806    Specimen: Blood Updated: 04/30/23 1844    Narrative:      The following orders were created for panel order CBC & Differential.  Procedure                               Abnormality         Status                     ---------                               -----------         ------                     CBC Auto Differential[[id number]]        Abnormal            Final result                 Please view results for these tests on the individual orders.    CBC Auto Differential [[id number]]  (Abnormal) Collected: 04/30/23 1806    Specimen: Blood Updated: 04/30/23 1844     WBC 13.73 10*3/mm3      RBC 4.17 10*6/mm3      Hemoglobin 12.3 g/dL      Hematocrit 36.8 %      MCV 88.2 fL      MCH 29.5 pg      MCHC 33.4 g/dL      RDW 15.9 %      RDW-SD 51.3 fl      MPV 12.0 fL      Platelets 59 10*3/mm3      Neutrophil % 92.6 %      Lymphocyte % 2.8 %      Monocyte % 4.0 %      Eosinophil % 0.1 %      Basophil % 0.1 %      Neutrophils, Absolute 12.72 10*3/mm3      Lymphocytes, Absolute 0.38 10*3/mm3      Monocytes, Absolute 0.55 10*3/mm3      Eosinophils, Absolute 0.01 10*3/mm3      Basophils, Absolute 0.02 10*3/mm3     Urinalysis, Microscopic Only - Urine, Catheter [[id number]]  (Abnormal) Collected: 04/30/23 1807    Specimen: Urine, Catheter Updated: 04/30/23 1833     RBC, UA 3-5 /HPF      WBC, UA Too Numerous to Count /HPF      Bacteria, UA 4+ /HPF      Squamous Epithelial Cells, UA 3-6 /HPF      Hyaline Casts, UA 0-2 /LPF      Methodology Automated Microscopy     Urinalysis With Culture If Indicated - Urine, Catheter [[id number]]  (Abnormal) Collected: 04/30/23 1807    Specimen: Urine, Catheter Updated: 04/30/23 1831     Color, UA Yellow     Appearance, UA Cloudy     pH, UA 7.0     Specific Gravity, UA 1.009     Glucose, UA Negative     Ketones, UA Negative     Bilirubin, UA Negative     Blood, UA Small (1+)     Protein, UA >=300 mg/dL (3+)     Leuk Esterase, UA Large (3+)     Nitrite, UA Negative     Urobilinogen, UA 0.2 E.U./dL    Narrative:      In absence of clinical symptoms, the presence of pyuria, bacteria, and/or nitrites on the urinalysis result does not correlate with infection.        Data Review:  Results from last 7 days   Lab Units 05/01/23  0525 04/30/23  1806   SODIUM mmol/L 132* 129*   POTASSIUM mmol/L 4.2 4.9   CHLORIDE mmol/L 98 92*   CO2 mmol/L 23.0 27.9   BUN mg/dL 44* 45*   CREATININE mg/dL 1.57* 1.45*   GLUCOSE mg/dL 144* 145*   CALCIUM mg/dL 8.3 9.1     Results from last 7 days   Lab Units 05/01/23  0525 04/30/23  1806   WBC 10*3/mm3 9.79 13.73*   HEMOGLOBIN g/dL 11.6* 12.3   HEMATOCRIT % 35.5 36.8   PLATELETS 10*3/mm3 51* 59*             Lab Results   Lab Value Date/Time    TROPONINT 87 (C) 04/30/2023 2131    TROPONINT 77 (C) 04/30/2023 1806    TROPONINT 81 (C) 04/05/2023 0642    TROPONINT 98 (C) 04/05/2023 0453    TROPONINT 76 (C) 04/04/2023 1125    TROPONINT 67 (C) 03/07/2023 1441    TROPONINT 77 (C) 03/07/2023 1153    TROPONINT 0.052 (C) 12/20/2022 2113    TROPONINT 0.051 (C) 12/20/2022 1350    TROPONINT 0.010 08/07/2022 1046    TROPONINT <0.010 08/01/2022 1017    TROPONINT 0.017 07/31/2022 0920    TROPONINT 0.037 (C) 07/30/2022 1034    TROPONINT 0.034 (C) 03/04/2022 0645    TROPONINT 0.024 03/02/2022 1632         Results from last 7 days   Lab Units 05/01/23  0525 04/30/23  1806   ALK PHOS U/L 72 66   BILIRUBIN mg/dL 0.5 0.5   ALT (SGPT) U/L 50* 39*   AST (SGOT) U/L 52* 38*             Glucose   Date/Time Value Ref Range Status   05/01/2023 1121  272 (H) 70 - 130 mg/dL Final     Comment:     Meter: [id number] : [id number] Raza Carty CNA   05/01/2023 0628 166 (H) 70 - 130 mg/dL Final     Comment:     Meter: [id number] : [id number] Camilo Stover NA   04/30/2023 2240 290 (H) 70 - 130 mg/dL Final     Comment:     Meter: [id number] : [id number] Arely Ramos RN           Past Medical History:   Diagnosis Date   • Aortic valve stenosis     s/p tissue AVR   • Back pain    • CKD (chronic kidney disease)    • Colitis due to Clostridioides difficile 01/26/2022   • Diastolic dysfunction     Grade 2 per echocardiogram 2013   • Diverticulosis    • Exertional shortness of breath    • Heart disease    • Hiatal hernia    • Hyperlipidemia    • Hypertension    • Hyperthyroidism    • Hypertriglyceridemia 05/31/2018   • Hypothyroidism    • L5 compression fracture (HCC) 08/2022   • Left ventricular hypertrophy    • Legally blind    • Liver disease    • Low back pain    • Macular degeneration    • Mitral regurgitation    • Osteoarthritis of hip    • Osteoporosis    • Pancreatitis 01/26/2022   • Paroxysmal atrial fibrillation    • Peripheral neuropathy    • Premature ventricular contractions    • Pulmonary hypertension    • Renal insufficiency syndrome    • Type 2 diabetes mellitus    • Uterine cancer        Assessment:  Active Hospital Problems    Diagnosis  POA   • **Acute UTI [N39.0]  Yes   • Diabetic polyneuropathy associated with type 2 diabetes mellitus [E11.42]  Yes   • Other cirrhosis of liver [K74.69]  Yes   • Chronic heart failure with preserved ejection fraction [I50.32]  Yes   • Type 2 diabetes mellitus with hyperglycemia [E11.65]  Yes   • Gastroparesis [K31.84]  Yes   • Pulmonary hypertension [I27.20]  Yes   • Paroxysmal atrial fibrillation [I48.0]  Yes   • Legally blind [H54.8]  Yes   • Stage 4 chronic kidney disease [N18.4]  Yes      Resolved Hospital Problems   No resolved problems to display.       Plan:  Continue antibiotics.. Follow lab and  await sensitivities. Check CT of abdomen.    Juan Lennon MD  5/1/2023  14:54 EDT

## 2023-05-01 NOTE — PLAN OF CARE
Goal Outcome Evaluation:      Pt admitted to floor from ED with acute UTI. VSS ex hypertensive. SR-SB. 2L while sleeping. Disoriented to time. F/c in place on admission. Q 2 turns. Healed pressure injury to coccyx, redness blanchable, zinc cream and mepilex applied. C/o pain in face, scheduled norco given. Prn ativan given. Cardiology consulted for elevated troponin.

## 2023-05-01 NOTE — CONSULTS
Patient Name: Helena Carmen  :1931  89 y.o.    Date of Admission: 2023  Date of Consultation:  23  Encounter Provider: Casey Thompson MD  Place of Service: [facility] CARDIOLOGY  Referring Provider: Mango Arnold MD  Patient Care Team:  Mariah Hickman MD as PCP - General (Family Medicine)  Hadley, Beth ROMERO MD as Consulting Physician (Cardiology)  Rolando Wick MD as Consulting Physician (Nephrology)  Pablo Sherman Jr., MD as Consulting Physician (Hematology and Oncology)  Mango Arnold MD as Referring Physician (Internal Medicine)      Chief complaint:generalized weakness and arm pain     History of Present Illness:Helena Carmen is a 89 year old pt of Dr. Butcher with a history of DM II,diastolic CHF, pulmonary HTN, hypothyroidism, aortic valve stenosis s/p [place] AVR  in  by Dr. can , tricuspid/mitral valve insufficiency, PAF, PVCs,  ITP, anemia,and CKD with chronic urinary retention with indwelling F/C.     Pt was admitted in 3/2022 for acute on chronic diastolic heart failure and NADER. Pt has been treated with Lasix 40 mg every other day and daily spironolactone per nephrology .     Pt was admitted in  for UTI. Pt was admitted in 2022 for for lumbar vertebral fracture.  Surgery recommended conservative treatment.     An ECHO in 2022 showed ejection fraction 61-65% with mild concentric left ventricle hypertrophy, grade 2 diastolic dysfunction, normal bioprosthetic aortic valve, mild mitral insufficiency with mild to moderate tricuspid insufficiency, normal RVSP.  She had lower extremity Dopplers done 2022 showing acute left lower extremity DVT in the soleal vein and acute right lower extremity DVT in the soleal vein, she is now on Eliquis.    Pt was admitted in  with acute on chornic CHF and was treated with IV diuretics. An ECHo on 23 showed  ejection fraction 50% with moderate left atrial enlargement,  bioprosthetic aortic valve normal, RVSP 75 mmHg.  She had MRI lumbar spine done 3/7/2023 showing lumbar fractures.  She had a noncontrasted CT chest on 3/12/2023 which showed buckled contour of the anterior lateral right ribs which likely represented acute buckle fractures with minimal bilateral pleural effusions.    Pt was seen in the office for follow up on 4/18/23 by . Pt denied any shortness of breath, orthopnea, or PND. Pt had minimal lower extremity edema. Her carvedilol was decreased to 6.25 mg in am. Her last TSH was normal but during her admission in April her levothyroxine was doubled. It was decreased back to 25 mcq    Pt presented to ER on 4/30/23 with complaints of an episode of feeling hot, sweaty, cold, clammy and a headache. Pt reported this his how she feels when she has a UTI. In ER, troponin 77, proBNP 83,,Lactate 2.4, procal 0.33, WBC 13.73, , BUN/CR 45/1.45, UA negative for nitrates, 4 + bacteria, COVID negative, blood cultures drawn, EKG showed ST depression, lateral leads, borderline ST elevation anterior leads, HR 55,     An ECHO 4/5/23 showed  normal LVSF, with EF of 50%, she has severe pulmonary hypertension, CXR showed nothing acute . Levaquin started.      I have been asked to see for elevated troponin and history of pHTN.  Currently, patient complains mostly of intermittently feeling lethargic as well as having shivering spells.  He denies any chest pains.  She is receiving antibiotics for her UTI, and states that she does feel mildly improved when compared to prior presentation.            ECHO 4/5/23  Left ventricular systolic function is normal. Estimated left ventricular EF = 50%  •  Left atrial volume is moderately increased.  •  There is a bioprosthetic aortic valve present.  •  There is mild, bileaflet mitral valve thickening present.  •  Calculated right ventricular systolic pressure from tricuspid regurgitation is 75 mmHg.  •  Severe pulmonary hypertension is  present.  •  The aortic valve peak and mean gradients are within defined limits.         Past Medical History:   Diagnosis Date   • Aortic valve stenosis     s/p tissue AVR   • Back pain    • CKD (chronic kidney disease)    • Colitis due to Clostridioides difficile 01/26/2022   • Diastolic dysfunction     Grade 2 per echocardiogram 2013   • Diverticulosis    • Exertional shortness of breath    • Heart disease    • Hiatal hernia    • Hyperlipidemia    • Hypertension    • Hyperthyroidism    • Hypertriglyceridemia 05/31/2018   • Hypothyroidism    • L5 compression fracture (HCC) 08/2022   • Left ventricular hypertrophy    • Legally blind    • Liver disease    • Low back pain    • Macular degeneration    • Mitral regurgitation    • Osteoarthritis of hip    • Osteoporosis    • Pancreatitis 01/26/2022   • Paroxysmal atrial fibrillation    • Peripheral neuropathy    • Premature ventricular contractions    • Pulmonary hypertension    • Renal insufficiency syndrome    • Type 2 diabetes mellitus    • Uterine cancer        Past Surgical History:   Procedure Laterality Date   • AORTIC VALVE REPAIR/REPLACEMENT     • CATARACT EXTRACTION      1970, 1999   • CHOLECYSTECTOMY     • ENDOSCOPY  08/15/2014    no gross lesions in stomach/duodenum, erythrematous mucosa in stomach   • EPIDURAL BLOCK     • HYSTERECTOMY  2007   • STERNOTOMY           Prior to Admission medications    Medication Sig Start Date End Date Taking? Authorizing Provider   bismuth subsalicylate (PEPTO BISMOL) 262 MG/15ML suspension Take 30 mL by mouth Every 6 (Six) Hours As Needed for Indigestion. 4/7/23  Yes Brian Bella MD   calcium carbonate (TUMS) 500 MG chewable tablet Chew 2 tablets 3 (Three) Times a Day As Needed for Indigestion or Heartburn. 4/7/23  Yes Brian Bella MD   carvedilol (COREG) 12.5 MG tablet Take 1 tablet by mouth 2 (Two) Times a Day. 1/2 tab in am and full tab in evening 4/18/23  Yes Beth Butcher MD   cholecalciferol  (VITAMIN D3) 25 MCG (1000 UT) tablet Take 1 tablet by mouth Daily. 8/30/22  Yes Eran Forbes MD   famotidine (PEPCID) 40 MG tablet Take 1 tablet by mouth Every Night. 4/7/23  Yes Brian Bella MD   fluticasone-salmeterol (Advair HFA) 115-21 MCG/ACT inhaler Inhale 2 puffs 2 (Two) Times a Day. 3/1/23  Yes Sima Blum APRN   furosemide (LASIX) 40 MG tablet Take 1 tablet by mouth Daily.   Yes Provider, MD Luke   gabapentin (NEURONTIN) 600 MG tablet Take 1 tablet by mouth 2 (Two) Times a Day. 3/14/23  Yes Ovidio Anguiano MD   hydrALAZINE (APRESOLINE) 25 MG tablet Take 2 tablets by mouth 2 (Two) Times a Day. 12/25/22  Yes Remington Schwartz MD   HYDROcodone-acetaminophen (NORCO) 7.5-325 MG per tablet Take 1 tablet by mouth Every 6 (Six) Hours. 3/14/23  Yes Ovidio Anguiano MD   insulin glargine (LANTUS, SEMGLEE) 100 UNIT/ML injection Inject 20 Units under the skin into the appropriate area as directed Every Night. 4/7/23  Yes Brian Bella MD   insulin lispro (ADMELOG) 100 UNIT/ML injection Inject 0-9 Units under the skin into the appropriate area as directed 3 (Three) Times a Day Before Meals. 3/14/23  Yes Preethi Rodriguez APRN   levothyroxine (SYNTHROID, LEVOTHROID) 50 MCG tablet Take 1 tablet by mouth Every Morning. 3/15/23  Yes Preethi Rodriguez APRN   lidocaine (LIDODERM) 5 % Place 1 patch on the skin as directed by provider Daily. Remove & Discard patch within 12 hours or as directed by MD 12/7/22  Yes Luly Gaines MD   LORazepam (ATIVAN) 0.5 MG tablet Take 1 tablet by mouth Every 8 (Eight) Hours As Needed for Anxiety. 3/14/23  Yes Ovidio Anguiano MD   magnesium oxide (MAG-OX) 400 MG tablet Take 0.5 tablets by mouth Daily. 4/18/23  Yes Beth Butcher MD   montelukast (SINGULAIR) 10 MG tablet Take 1 tablet by mouth Every Night. 7/12/13  Yes Provider, MD Luke   ondansetron (ZOFRAN) 8 MG tablet Take 1 tablet by mouth  As Needed. 22  Yes Provider, MD Luke   polyethylene glycol (MIRALAX) 17 GM/SCOOP powder Take 17 g by mouth Daily.   Yes Provider, MD Luke   predniSONE (DELTASONE) 10 MG tablet Take 1 tablet by mouth Daily. 23  Yes Pablo Sherman Jr., MD   tamsulosin (FLOMAX) 0.4 MG capsule 24 hr capsule Take 1 capsule by mouth every night at bedtime.   Yes Provider, Luke, MD       Allergies   Allergen Reactions   • Baclofen Unknown - Low Severity     Yeast infection   • Erythromycin Unknown (See Comments) and Other (See Comments)     Pt states she does not remember but it was many years ago  Pt states she does not remember but it was many years ago   • Furosemide Other (See Comments) and Unknown (See Comments)     Other reaction(s): Other: PER PT REP KIDNEY ISSUES, Other  Other reaction(s): Other: PER PT REP KIDNEY ISSUES, Other     • Statins Myalgia   • Cephalexin Other (See Comments) and Unknown (See Comments)     2022 Pt has tolerated ceftriaxone and cefepime during admission.   Pt states she does not remember reaction but it was many years ago   • Penicillins Rash   • Sulfa Antibiotics Itching and Rash       Social History     Socioeconomic History   • Marital status:    Tobacco Use   • Smoking status: Former     Packs/day: 0.50     Types: Cigarettes     Quit date: 7/10/1969     Years since quittin.8   • Smokeless tobacco: Never   Vaping Use   • Vaping Use: Never used   Substance and Sexual Activity   • Alcohol use: No     Comment: caffeine use - coffee 2 cups daily   • Drug use: No   • Sexual activity: Defer       Family History   Problem Relation Age of Onset   • Heart disease Mother    • Hypertension Mother    • Stroke Mother    • Diabetes Mother         mellitus   • Other Other         cardiovascular disorder       REVIEW OF SYSTEMS:   All systems reviewed.  Pertinent positives identified in HPI.  All other systems are negative.      Objective:     Vitals:    23 0434  05/01/23 0637 05/01/23 0700 05/01/23 0823   BP: 158/55  119/44    BP Location: Right arm  Right arm    Patient Position: Lying  Lying    Pulse: 63  50 (!) 48   Resp: 16  16 16   Temp: 100.2 °F (37.9 °C) 98.5 °F (36.9 °C) 98.5 °F (36.9 °C)    TempSrc: Oral Oral Oral    SpO2: 97%  95% 99%   Weight:       Height:         Body mass index is 38.64 kg/m².    General Appearance:    Alert, cooperative, in no acute distress, appears chronically ill   Head:    Normocephalic, without obvious abnormality, atraumatic   Eyes:            Lids and lashes normal, conjunctivae and sclerae normal, no icterus, no pallor, corneas clear, PERRLA   Ears:    Ears appear intact with no abnormalities noted   Throat:   No oral lesions, no thrush, oral mucosa moist   Neck:   No adenopathy, supple, trachea midline, no thyromegaly, no carotid bruit, no JVD   Back:     No kyphosis present, no scoliosis present, no skin lesions, erythema or scars, no tenderness to percussion or palpation, range of motion normal   Lungs:     Clear to auscultation, respirations regular, even and unlabored    Heart:    Regular rhythm and bradycardic, normal S1 and S2, 2/6 DANYELL at RUSB, no gallop, no rub, no click   Chest Wall:    No abnormalities observed   Abdomen:     Normal bowel sounds, no masses, no organomegaly, soft, nontender, nondistended, no guarding, no rebound  tenderness   Extremities:   Moves all extremities well, trace lower extremity edema, no cyanosis, no redness   Pulses:   Pulses palpable and equal bilaterally. Normal radial, carotid, femoral, dorsalis pedis and posterior tibial pulses bilaterally. Normal abdominal aorta   Skin:  Psychiatric:   No bleeding, bruising or rash    Alert and oriented x 3, normal mood and affect, slightly slow speech   Lab Review:     Results from last 7 days   Lab Units 05/01/23  0525   SODIUM mmol/L 132*   POTASSIUM mmol/L 4.2   CHLORIDE mmol/L 98   CO2 mmol/L 23.0   BUN mg/dL 44*   CREATININE mg/dL 1.57*   CALCIUM mg/dL  8.3   BILIRUBIN mg/dL 0.5   ALK PHOS U/L 72   ALT (SGPT) U/L 50*   AST (SGOT) U/L 52*   GLUCOSE mg/dL 144*     Results from last 7 days   Lab Units 04/30/23  2131 04/30/23  1806   HSTROP T ng/L 87* 77*     Results from last 7 days   Lab Units 05/01/23  0525   WBC 10*3/mm3 9.79   HEMOGLOBIN g/dL 11.6*   HEMATOCRIT % 35.5   PLATELETS 10*3/mm3 51*                                           I personally viewed and interpreted the patient's EKG/Telemetry data.    EKG and telemetry show sinus bradycardia, rates in the 40s to 50s.  Nonspecific ST segment changes.    Assessment and Plan:       1.  Acute myocardial injury  -Likely related to underlying heart disease/pulmonary hypertension in the setting of CKD and acute insults of the urinary tract infection.  - I reviewed her EKGs,There are nonspecific ST segment changes which appear unchanged from prior EKG dated April 18, 2023.  This troponin elevation does not suggest ACS  - Her cardiac status is relatively unchanged from prior evaluations it appears, no need for repeat cardiac testing at this time.    2.  Sinus bradycardia  - Stable, asymptomatic  -Would recommend decreasing her carvedilol to 3.125 twice daily given the prominence of her sinus bradycardia.  This is decreased from 6.25 AM and 12.5 nightly dose    3.  Hypothyroidism  - On levothyroxine    4. Severe aortic stenosis, status post AVR [facility]. Mitch Trifecta bovine   pericardial valve, 21 mm on 06/08/2013.  Previous gradients on echo performed a month ago were normal.  Nothing on her presentation to suggest worsening for now    5. Chronic diastolic heart failure: History of grade 2 diastolic dysfunction  -  She was admitted in December 2022 with acute on chronic HFpEF.  She required IV diuresis and was discharged home on 40 mg Lasix.  Leg swelling is better on lower dose amlodipine.  Of note she was not able to tolerate Jardiance or Farxiga due to UTI.  Spironolactone was also stopped due to NADER. Weight up 7 pounds  from last office visit   -Continue home dose diuretic.  Clinically on exam she does not appear to be newly volume overloaded.    Thank you for the consult, cardiology will follow bradycardia but no further cardiac testing needed per above.      Casey Thompson MD  05/01/23  09:56 EDT

## 2023-05-02 LAB
ANION GAP SERPL CALCULATED.3IONS-SCNC: 13 MMOL/L (ref 5–15)
BACTERIA SPEC AEROBE CULT: ABNORMAL
BACTERIA SPEC AEROBE CULT: ABNORMAL
BASOPHILS # BLD AUTO: 0.01 10*3/MM3 (ref 0–0.2)
BASOPHILS NFR BLD AUTO: 0.2 % (ref 0–1.5)
BUN SERPL-MCNC: 48 MG/DL (ref 8–23)
BUN/CREAT SERPL: 26.8 (ref 7–25)
CALCIUM SPEC-SCNC: 8.1 MG/DL (ref 8.2–9.6)
CHLORIDE SERPL-SCNC: 98 MMOL/L (ref 98–107)
CO2 SERPL-SCNC: 23 MMOL/L (ref 22–29)
CREAT SERPL-MCNC: 1.79 MG/DL (ref 0.57–1)
DEPRECATED RDW RBC AUTO: 53.4 FL (ref 37–54)
EGFRCR SERPLBLD CKD-EPI 2021: 26.3 ML/MIN/1.73
EOSINOPHIL # BLD AUTO: 0.08 10*3/MM3 (ref 0–0.4)
EOSINOPHIL NFR BLD AUTO: 1.2 % (ref 0.3–6.2)
ERYTHROCYTE [DISTWIDTH] IN BLOOD BY AUTOMATED COUNT: 16 % (ref 12.3–15.4)
GLUCOSE BLDC GLUCOMTR-MCNC: 208 MG/DL (ref 70–130)
GLUCOSE BLDC GLUCOMTR-MCNC: 230 MG/DL (ref 70–130)
GLUCOSE BLDC GLUCOMTR-MCNC: 242 MG/DL (ref 70–130)
GLUCOSE BLDC GLUCOMTR-MCNC: 279 MG/DL (ref 70–130)
GLUCOSE SERPL-MCNC: 207 MG/DL (ref 65–99)
HBA1C MFR BLD: 5.6 % (ref 4.8–5.6)
HCT VFR BLD AUTO: 32.1 % (ref 34–46.6)
HGB BLD-MCNC: 10.1 G/DL (ref 12–15.9)
IMM GRANULOCYTES # BLD AUTO: 0.04 10*3/MM3 (ref 0–0.05)
IMM GRANULOCYTES NFR BLD AUTO: 0.6 % (ref 0–0.5)
LYMPHOCYTES # BLD AUTO: 1.71 10*3/MM3 (ref 0.7–3.1)
LYMPHOCYTES NFR BLD AUTO: 25.8 % (ref 19.6–45.3)
MCH RBC QN AUTO: 28.5 PG (ref 26.6–33)
MCHC RBC AUTO-ENTMCNC: 31.5 G/DL (ref 31.5–35.7)
MCV RBC AUTO: 90.7 FL (ref 79–97)
MONOCYTES # BLD AUTO: 0.95 10*3/MM3 (ref 0.1–0.9)
MONOCYTES NFR BLD AUTO: 14.4 % (ref 5–12)
NEUTROPHILS NFR BLD AUTO: 3.83 10*3/MM3 (ref 1.7–7)
NEUTROPHILS NFR BLD AUTO: 57.8 % (ref 42.7–76)
NRBC BLD AUTO-RTO: 0.2 /100 WBC (ref 0–0.2)
PLATELET # BLD AUTO: 38 10*3/MM3 (ref 140–450)
PMV BLD AUTO: 12.2 FL (ref 6–12)
POTASSIUM SERPL-SCNC: 4.5 MMOL/L (ref 3.5–5.2)
QT INTERVAL: 510 MS
RBC # BLD AUTO: 3.54 10*6/MM3 (ref 3.77–5.28)
SODIUM SERPL-SCNC: 134 MMOL/L (ref 136–145)
WBC NRBC COR # BLD: 6.62 10*3/MM3 (ref 3.4–10.8)

## 2023-05-02 PROCEDURE — 94664 DEMO&/EVAL PT USE INHALER: CPT

## 2023-05-02 PROCEDURE — 94799 UNLISTED PULMONARY SVC/PX: CPT

## 2023-05-02 PROCEDURE — 93010 ELECTROCARDIOGRAM REPORT: CPT | Performed by: INTERNAL MEDICINE

## 2023-05-02 PROCEDURE — 63710000001 INSULIN LISPRO (HUMAN) PER 5 UNITS: Performed by: HOSPITALIST

## 2023-05-02 PROCEDURE — 85025 COMPLETE CBC W/AUTO DIFF WBC: CPT | Performed by: HOSPITALIST

## 2023-05-02 PROCEDURE — 83036 HEMOGLOBIN GLYCOSYLATED A1C: CPT | Performed by: HOSPITALIST

## 2023-05-02 PROCEDURE — 94760 N-INVAS EAR/PLS OXIMETRY 1: CPT

## 2023-05-02 PROCEDURE — 97162 PT EVAL MOD COMPLEX 30 MIN: CPT

## 2023-05-02 PROCEDURE — 99232 SBSQ HOSP IP/OBS MODERATE 35: CPT | Performed by: STUDENT IN AN ORGANIZED HEALTH CARE EDUCATION/TRAINING PROGRAM

## 2023-05-02 PROCEDURE — 97110 THERAPEUTIC EXERCISES: CPT

## 2023-05-02 PROCEDURE — 94761 N-INVAS EAR/PLS OXIMETRY MLT: CPT

## 2023-05-02 PROCEDURE — 25010000002 LEVOFLOXACIN PER 250 MG: Performed by: NURSE PRACTITIONER

## 2023-05-02 PROCEDURE — 93005 ELECTROCARDIOGRAM TRACING: CPT | Performed by: NURSE PRACTITIONER

## 2023-05-02 PROCEDURE — 82948 REAGENT STRIP/BLOOD GLUCOSE: CPT

## 2023-05-02 PROCEDURE — 80048 BASIC METABOLIC PNL TOTAL CA: CPT | Performed by: HOSPITALIST

## 2023-05-02 RX ORDER — AMLODIPINE BESYLATE 5 MG/1
5 TABLET ORAL
Status: DISCONTINUED | OUTPATIENT
Start: 2023-05-02 | End: 2023-05-04 | Stop reason: HOSPADM

## 2023-05-02 RX ADMIN — LEVOFLOXACIN 750 MG: 5 INJECTION, SOLUTION INTRAVENOUS at 09:13

## 2023-05-02 RX ADMIN — HYDROCODONE BITARTRATE AND ACETAMINOPHEN 1 TABLET: 7.5; 325 TABLET ORAL at 02:17

## 2023-05-02 RX ADMIN — HYDRALAZINE HYDROCHLORIDE 50 MG: 50 TABLET, FILM COATED ORAL at 22:18

## 2023-05-02 RX ADMIN — HYDROCODONE BITARTRATE AND ACETAMINOPHEN 1 TABLET: 7.5; 325 TABLET ORAL at 18:38

## 2023-05-02 RX ADMIN — MONTELUKAST SODIUM 10 MG: 10 TABLET, FILM COATED ORAL at 20:20

## 2023-05-02 RX ADMIN — AMLODIPINE BESYLATE 5 MG: 5 TABLET ORAL at 18:37

## 2023-05-02 RX ADMIN — INSULIN GLARGINE-YFGN 20 UNITS: 100 INJECTION, SOLUTION SUBCUTANEOUS at 20:44

## 2023-05-02 RX ADMIN — GABAPENTIN 600 MG: 300 CAPSULE ORAL at 20:20

## 2023-05-02 RX ADMIN — INSULIN LISPRO 3 UNITS: 100 INJECTION, SOLUTION INTRAVENOUS; SUBCUTANEOUS at 09:15

## 2023-05-02 RX ADMIN — BUDESONIDE AND FORMOTEROL FUMARATE DIHYDRATE 2 PUFF: 160; 4.5 AEROSOL RESPIRATORY (INHALATION) at 19:32

## 2023-05-02 RX ADMIN — TAMSULOSIN HYDROCHLORIDE 0.4 MG: 0.4 CAPSULE ORAL at 20:20

## 2023-05-02 RX ADMIN — HYDRALAZINE HYDROCHLORIDE 50 MG: 50 TABLET, FILM COATED ORAL at 09:14

## 2023-05-02 RX ADMIN — HYDROCODONE BITARTRATE AND ACETAMINOPHEN 1 TABLET: 7.5; 325 TABLET ORAL at 09:14

## 2023-05-02 RX ADMIN — CARVEDILOL 3.12 MG: 3.12 TABLET, FILM COATED ORAL at 20:20

## 2023-05-02 RX ADMIN — INSULIN LISPRO 4 UNITS: 100 INJECTION, SOLUTION INTRAVENOUS; SUBCUTANEOUS at 11:58

## 2023-05-02 RX ADMIN — BUDESONIDE AND FORMOTEROL FUMARATE DIHYDRATE 2 PUFF: 160; 4.5 AEROSOL RESPIRATORY (INHALATION) at 08:06

## 2023-05-02 RX ADMIN — LEVOTHYROXINE SODIUM 50 MCG: 0.05 TABLET ORAL at 05:00

## 2023-05-02 RX ADMIN — GABAPENTIN 600 MG: 300 CAPSULE ORAL at 09:15

## 2023-05-02 RX ADMIN — MAGNESIUM OXIDE 400 MG (241.3 MG MAGNESIUM) TABLET 200 MG: TABLET at 09:14

## 2023-05-02 RX ADMIN — LORAZEPAM 0.5 MG: 0.5 TABLET ORAL at 05:00

## 2023-05-02 RX ADMIN — POLYETHYLENE GLYCOL 3350 17 G: 17 POWDER, FOR SOLUTION ORAL at 09:13

## 2023-05-02 RX ADMIN — FUROSEMIDE 40 MG: 40 TABLET ORAL at 09:14

## 2023-05-02 RX ADMIN — FAMOTIDINE 40 MG: 20 TABLET, FILM COATED ORAL at 20:20

## 2023-05-02 RX ADMIN — HYDROCODONE BITARTRATE AND ACETAMINOPHEN 1 TABLET: 7.5; 325 TABLET ORAL at 13:44

## 2023-05-02 RX ADMIN — Medication 1000 UNITS: at 09:14

## 2023-05-02 RX ADMIN — INSULIN LISPRO 3 UNITS: 100 INJECTION, SOLUTION INTRAVENOUS; SUBCUTANEOUS at 18:38

## 2023-05-02 RX ADMIN — LIDOCAINE 1 PATCH: 140 PATCH CUTANEOUS at 02:18

## 2023-05-02 RX ADMIN — LORAZEPAM 0.5 MG: 0.5 TABLET ORAL at 20:20

## 2023-05-02 NOTE — DISCHARGE PLACEMENT REQUEST
"Helena Carmen (89 y.o. Female)     Date of Birth   02/20/1931    Social Security Number       Address   [address] [place] [address]    Home Phone   [phone]    MRN   [id number]       Hindu   Denominational    Marital Status                               Admission Date   4/30/23    Admission Type   Emergency    Admitting Provider   Michael Allison MD    Attending Provider   Juan Lennon MD    Department, Room/Bed   [address], [address]/1       Discharge Date       Discharge Disposition       Discharge Destination                               Attending Provider: Juan Lennon MD    Allergies: Baclofen, Erythromycin, Furosemide, Statins, Cephalexin, Penicillins, Sulfa Antibiotics    Isolation: None   Infection: None   Code Status: No CPR    Ht: 144.8 cm (57\")   Wt: 81 kg (178 lb 9.2 oz)    Admission Cmt: None   Principal Problem: Acute UTI [N39.0]                 Active Insurance as of 4/30/2023     Primary Coverage     Payor Plan Insurance Group Employer/Plan Group    MEDICARE MEDICARE A & B      Payor Plan Address Payor Plan Phone Number Payor Plan Fax Number Effective Dates    [address] [phone]  2/1/1996 - None Entered    [facility] [id number]       Subscriber Name Subscriber Birth Date Member ID       HELENA CARMEN 2/20/1931 [id number]           Secondary Coverage     Payor Plan Insurance Group Employer/Plan Group     FOR LIFE  FOR LIFE  SUP       Payor Plan Address Payor Plan Phone Number Payor Plan Fax Number Effective Dates    [address] [phone]  4/5/2016 - None Entered    [facility] [id number]       Subscriber Name Subscriber Birth Date Member ID       HELENA CARMEN 2/20/1931 [id number]                 Emergency Contacts      (Rel.) Home Phone Work Phone Mobile Phone    QUAN CARMEN (Son) [phone] -- --    Radha Hoyos (Daughter) [phone] -- [phone]    BeronicaMargaret (Daughter) [phone] -- " [phone]    ARAM SPAIN (Grandchild) -- -- [phone]

## 2023-05-02 NOTE — PLAN OF CARE
Goal Outcome Evaluation:  Plan of Care Reviewed With: patient        Progress: no change  Outcome Evaluation: VSS. Pt complained of pain in Right shoulder. Relieved with scheduled pain medication and lidocain patch. Night dose of carvedilol held due to low HR.  Will continue to monitor.

## 2023-05-02 NOTE — PLAN OF CARE
Goal Outcome Evaluation:  Plan of Care Reviewed With: patient           Outcome Evaluation: Pt is a 89 yo female adm from home with UTI. Pt has an indwelling FC and has a compelx medical hx with frequent admissions, she is legally blind, she lives in a house with 24/7 care, reports that someone walks beside her for safety while walking with her walker in the home. Pt presents with impaired functional mobility and activity tolerance, she will benefit from PT to see while inpatient, is currently below her baseline, but has enough assist at home that she is appropriate to return home at discharge

## 2023-05-02 NOTE — PROGRESS NOTES
"DAILY PROGRESS NOTE  [facility]    Patient Identification:  Name: Helena Carmen  Age: 89 y.o.  Sex: female  :  1931  MRN: [id number]         Primary Care Physician: Mariah Hickman MD    Subjective:  Interval History:She is weak.    Objective:    Scheduled Meds:budesonide-formoterol, 2 puff, Inhalation, BID - RT  carvedilol, 3.125 mg, Oral, BID  cholecalciferol, 1,000 Units, Oral, Daily  famotidine, 40 mg, Oral, Nightly  furosemide, 40 mg, Oral, Daily  gabapentin, 600 mg, Oral, Q12H  hydrALAZINE, 50 mg, Oral, BID  HYDROcodone-acetaminophen, 1 tablet, Oral, Q6H  insulin glargine, 20 Units, Subcutaneous, Nightly  insulin lispro, 2-7 Units, Subcutaneous, TID With Meals  levoFLOXacin, 750 mg, Intravenous, Q48H  levothyroxine, 50 mcg, Oral, Q AM  lidocaine, 1 patch, Transdermal, Q24H  magnesium oxide, 200 mg, Oral, Daily  montelukast, 10 mg, Oral, Nightly  polyethylene glycol, 17 g, Oral, Daily  tamsulosin, 0.4 mg, Oral, Nightly      Continuous Infusions:     Vital signs in last 24 hours:  Temp:  [97.8 °F (36.6 °C)-98.4 °F (36.9 °C)] 98.4 °F (36.9 °C)  Heart Rate:  [47-48] 48  Resp:  [18] 18  BP: (163-171)/(56-88) 163/63    Intake/Output:    Intake/Output Summary (Last 24 hours) at 2023 1413  Last data filed at 2023 1014  Gross per 24 hour   Intake --   Output 1050 ml   Net -1050 ml       Exam:  /63 (BP Location: Right arm, Patient Position: Lying)   Pulse (!) 48   Temp 98.4 °F (36.9 °C) (Oral)   Resp 18   Ht 144.8 cm (57\")   Wt 81 kg (178 lb 9.2 oz)   SpO2 100%   BMI 38.64 kg/m²     General Appearance:    Alert, cooperative, no distress   Head:    Normocephalic, without obvious abnormality, atraumatic   Eyes:       Throat:   Lips, tongue, gums normal   Neck:   Supple, symmetrical, trachea midline, no JVD   Lungs:     Clear to auscultation bilaterally, respirations unlabored   Chest Wall:    No tenderness or deformity    Heart:    Regular rate and rhythm, S1 and S2 " normal, no murmur,no  Rub or gallop   Abdomen:     Soft, nontender, bowel sounds active, no masses, no organomegaly    Extremities:   Extremities normal, atraumatic, no cyanosis or edema   Pulses:      Skin:   Skin is warm and dry,  no rashes or palpable lesions   Neurologic:   She is weak.      Lab Results (last 72 hours)     Procedure Component Value Units Date/Time    POC Glucose Once [[id number]]  (Abnormal) Collected: 05/01/23 1121    Specimen: Blood Updated: 05/01/23 1126     Glucose 272 mg/dL      Comment: Meter: [id number] : [id number] Raza Carty CNA       Blood Culture - Blood, Arm, Left [[id number]]  (Abnormal) Collected: 04/30/23 1858    Specimen: Blood from Arm, Left Updated: 05/01/23 1059     Blood Culture Abnormal Stain     Gram Stain Aerobic Bottle Gram negative bacilli    STAT Lactic Acid, Reflex [[id number]]  (Normal) Collected: 05/01/23 1001    Specimen: Blood Updated: 05/01/23 1037     Lactate 1.8 mmol/L     Urine Culture - Urine, Urine, Catheter [[id number]]  (Abnormal) Collected: 04/30/23 1807    Specimen: Urine, Catheter Updated: 05/01/23 1033     Urine Culture >100,000 CFU/mL Gram Negative Bacilli    Narrative:      Colonization of the urinary tract without infection is common. Treatment is discouraged unless the patient is symptomatic, pregnant, or undergoing an invasive urologic procedure.    Blood Culture ID, PCR - Blood, Arm, Left [[id number]]  (Abnormal) Collected: 04/30/23 1806    Specimen: Blood from Arm, Left Updated: 05/01/23 0942     BCID, PCR Escherichia coli. Identification by BCID2 PCR.     BOTTLE TYPE Anaerobic Bottle    Narrative:      No resistance genes detected.    POC Glucose Once [[id number]]  (Abnormal) Collected: 05/01/23 0628    Specimen: Blood Updated: 05/01/23 0629     Glucose 166 mg/dL      Comment: Meter: [id number] : [id number] Camilo JOHNSON       Comprehensive Metabolic Panel [[id number]]  (Abnormal) Collected: 05/01/23 0525    Specimen: Blood Updated:  05/01/23 0623     Glucose 144 mg/dL      BUN 44 mg/dL      Creatinine 1.57 mg/dL      Sodium 132 mmol/L      Potassium 4.2 mmol/L      Chloride 98 mmol/L      CO2 23.0 mmol/L      Calcium 8.3 mg/dL      Total Protein 5.7 g/dL      Albumin 3.1 g/dL      ALT (SGPT) 50 U/L      AST (SGOT) 52 U/L      Alkaline Phosphatase 72 U/L      Total Bilirubin 0.5 mg/dL      Globulin 2.6 gm/dL      A/G Ratio 1.2 g/dL      BUN/Creatinine Ratio 28.0     Anion Gap 11.0 mmol/L      eGFR 30.8 mL/min/1.73     Narrative:      GFR Normal >60  Chronic Kidney Disease <60  Kidney Failure <15    The GFR formula is only valid for adults with stable renal function between ages 18 and 70.    Blood Culture - Blood, Arm, Left [[id number]]  (Abnormal) Collected: 04/30/23 1806    Specimen: Blood from Arm, Left Updated: 05/01/23 0617     Blood Culture Abnormal Stain     Gram Stain Anaerobic Bottle Gram negative bacilli    CBC & Differential [[id number]]  (Abnormal) Collected: 05/01/23 0525    Specimen: Blood Updated: 05/01/23 0614    Narrative:      The following orders were created for panel order CBC & Differential.  Procedure                               Abnormality         Status                     ---------                               -----------         ------                     CBC Auto Differential[[id number]]        Abnormal            Final result                 Please view results for these tests on the individual orders.    CBC Auto Differential [[id number]]  (Abnormal) Collected: 05/01/23 0525    Specimen: Blood Updated: 05/01/23 0614     WBC 9.79 10*3/mm3      RBC 3.99 10*6/mm3      Hemoglobin 11.6 g/dL      Hematocrit 35.5 %      MCV 89.0 fL      MCH 29.1 pg      MCHC 32.7 g/dL      RDW 16.2 %      RDW-SD 52.4 fl      MPV 11.3 fL      Platelets 51 10*3/mm3      Neutrophil % 87.5 %      Lymphocyte % 4.4 %      Monocyte % 7.6 %      Eosinophil % 0.1 %      Basophil % 0.1 %      Immature Grans % 0.3 %      Neutrophils, Absolute 8.57  10*3/mm3      Lymphocytes, Absolute 0.43 10*3/mm3      Monocytes, Absolute 0.74 10*3/mm3      Eosinophils, Absolute 0.01 10*3/mm3      Basophils, Absolute 0.01 10*3/mm3      Immature Grans, Absolute 0.03 10*3/mm3      nRBC 0.1 /100 WBC     STAT Lactic Acid, Reflex [[id number]]  (Abnormal) Collected: 05/01/23 0320    Specimen: Blood Updated: 05/01/23 0404     Lactate 2.4 mmol/L     STAT Lactic Acid, Reflex [[id number]]  (Abnormal) Collected: 05/01/23 0016    Specimen: Blood Updated: 05/01/23 0111     Lactate 2.3 mmol/L     POC Glucose Once [[id number]]  (Abnormal) Collected: 04/30/23 2240    Specimen: Blood Updated: 04/30/23 2241     Glucose 290 mg/dL      Comment: Meter: [id number] : [id number] Arely Ramos RN       High Sensitivity Troponin T 2Hr [[id number]]  (Abnormal) Collected: 04/30/23 2131    Specimen: Blood Updated: 04/30/23 2214     HS Troponin T 87 ng/L      Troponin T Delta 10 ng/L     Narrative:      High Sensitive Troponin T Reference Range:  <10.0 ng/L- Negative Female for AMI  <15.0 ng/L- Negative Male for AMI  >=10 - Abnormal Female indicating possible myocardial injury.  >=15 - Abnormal Male indicating possible myocardial injury.   Clinicians would have to utilize clinical acumen, EKG, Troponin, and serial changes to determine if it is an Acute Myocardial Infarction or myocardial injury due to an underlying chronic condition.         STAT Lactic Acid, Reflex [[id number]]  (Abnormal) Collected: 04/30/23 2131    Specimen: Blood Updated: 04/30/23 2213     Lactate 2.9 mmol/L     Respiratory Panel PCR w/COVID-19(SARS-CoV-2) LATONIA/CHARLIE/ALEJANDRO/PAD/COR/MAD/PASQUALE In-House, NP Swab in [facility], 3-4 HR TAT - Swab, Nasopharynx [[id number]]  (Normal) Collected: 04/30/23 1807    Specimen: Swab from Nasopharynx Updated: 04/30/23 1919     ADENOVIRUS, PCR Not Detected     Coronavirus 229E Not Detected     Coronavirus HKU1 Not Detected     Coronavirus NL63 Not Detected     Coronavirus OC43 Not Detected     COVID19 Not  Detected     Human Metapneumovirus Not Detected     Human Rhinovirus/Enterovirus Not Detected     Influenza A PCR Not Detected     Influenza B PCR Not Detected     Parainfluenza Virus 1 Not Detected     Parainfluenza Virus 2 Not Detected     Parainfluenza Virus 3 Not Detected     Parainfluenza Virus 4 Not Detected     RSV, PCR Not Detected     Bordetella pertussis pcr Not Detected     Bordetella parapertussis PCR Not Detected     Chlamydophila pneumoniae PCR Not Detected     Mycoplasma pneumo by PCR Not Detected    Narrative:      In the setting of a positive respiratory panel with a viral infection PLUS a negative procalcitonin without other underlying concern for bacterial infection, consider observing off antibiotics or discontinuation of antibiotics and continue supportive care. If the respiratory panel is positive for atypical bacterial infection (Bordetella pertussis, Chlamydophila pneumoniae, or Mycoplasma pneumoniae), consider antibiotic de-escalation to target atypical bacterial infection.    Lactic Acid, Plasma [[id number]]  (Abnormal) Collected: 04/30/23 1806    Specimen: Blood Updated: 04/30/23 1902     Lactate 2.4 mmol/L     Single High Sensitivity Troponin T [[id number]]  (Abnormal) Collected: 04/30/23 1806    Specimen: Blood Updated: 04/30/23 1901     HS Troponin T 77 ng/L     Narrative:      High Sensitive Troponin T Reference Range:  <10.0 ng/L- Negative Female for AMI  <15.0 ng/L- Negative Male for AMI  >=10 - Abnormal Female indicating possible myocardial injury.  >=15 - Abnormal Male indicating possible myocardial injury.   Clinicians would have to utilize clinical acumen, EKG, Troponin, and serial changes to determine if it is an Acute Myocardial Infarction or myocardial injury due to an underlying chronic condition.         BNP [[id number]]  (Abnormal) Collected: 04/30/23 1806    Specimen: Blood Updated: 04/30/23 1900     proBNP 3,129.0 pg/mL     Narrative:      Among patients with dyspnea,  "NT-proBNP is highly sensitive for the detection of acute congestive heart failure. In addition NT-proBNP of <300 pg/ml effectively rules out acute congestive heart failure with 99% negative predictive value.    Results may be falsely decreased if patient taking Biotin.      Procalcitonin [[id number]]  (Abnormal) Collected: 04/30/23 1806    Specimen: Blood Updated: 04/30/23 1900     Procalcitonin 0.33 ng/mL     Narrative:      As a Marker for Sepsis (Non-Neonates):    1. <0.5 ng/mL represents a low risk of severe sepsis and/or septic shock.  2. >2 ng/mL represents a high risk of severe sepsis and/or septic shock.    As a Marker for Lower Respiratory Tract Infections that require antibiotic therapy:    PCT on Admission    Antibiotic Therapy       6-12 Hrs later    >0.5                Strongly Recommended  >0.25 - <0.5        Recommended   0.1 - 0.25          Discouraged              Remeasure/reassess PCT  <0.1                Strongly Discouraged     Remeasure/reassess PCT    As 28 day mortality risk marker: \"Change in Procalcitonin Result\" (>80% or <=80%) if Day 0 (or Day 1) and Day 4 values are available. Refer to [email or web address]    Change in PCT <=80%  A decrease of PCT levels below or equal to 80% defines a positive change in PCT test result representing a higher risk for 28-day all-cause mortality of patients diagnosed with severe sepsis for septic shock.    Change in PCT >80%  A decrease of PCT levels of more than 80% defines a negative change in PCT result representing a lower risk for 28-day all-cause mortality of patients diagnosed with severe sepsis or septic shock.       Comprehensive Metabolic Panel [[id number]]  (Abnormal) Collected: 04/30/23 1806    Specimen: Blood Updated: 04/30/23 1854     Glucose 145 mg/dL      BUN 45 mg/dL      Creatinine 1.45 mg/dL      Sodium 129 mmol/L      Potassium 4.9 mmol/L      Chloride 92 mmol/L      CO2 27.9 mmol/L      Calcium 9.1 mg/dL      Total " Protein 6.4 g/dL      Albumin 3.3 g/dL      ALT (SGPT) 39 U/L      AST (SGOT) 38 U/L      Alkaline Phosphatase 66 U/L      Total Bilirubin 0.5 mg/dL      Globulin 3.1 gm/dL      A/G Ratio 1.1 g/dL      BUN/Creatinine Ratio 31.0     Anion Gap 9.1 mmol/L      eGFR 33.9 mL/min/1.73     Narrative:      GFR Normal >60  Chronic Kidney Disease <60  Kidney Failure <15    The GFR formula is only valid for adults with stable renal function between ages 18 and 70.    CBC & Differential [[id number]]  (Abnormal) Collected: 04/30/23 1806    Specimen: Blood Updated: 04/30/23 1844    Narrative:      The following orders were created for panel order CBC & Differential.  Procedure                               Abnormality         Status                     ---------                               -----------         ------                     CBC Auto Differential[[id number]]        Abnormal            Final result                 Please view results for these tests on the individual orders.    CBC Auto Differential [[id number]]  (Abnormal) Collected: 04/30/23 1806    Specimen: Blood Updated: 04/30/23 1844     WBC 13.73 10*3/mm3      RBC 4.17 10*6/mm3      Hemoglobin 12.3 g/dL      Hematocrit 36.8 %      MCV 88.2 fL      MCH 29.5 pg      MCHC 33.4 g/dL      RDW 15.9 %      RDW-SD 51.3 fl      MPV 12.0 fL      Platelets 59 10*3/mm3      Neutrophil % 92.6 %      Lymphocyte % 2.8 %      Monocyte % 4.0 %      Eosinophil % 0.1 %      Basophil % 0.1 %      Neutrophils, Absolute 12.72 10*3/mm3      Lymphocytes, Absolute 0.38 10*3/mm3      Monocytes, Absolute 0.55 10*3/mm3      Eosinophils, Absolute 0.01 10*3/mm3      Basophils, Absolute 0.02 10*3/mm3     Urinalysis, Microscopic Only - Urine, Catheter [[id number]]  (Abnormal) Collected: 04/30/23 1807    Specimen: Urine, Catheter Updated: 04/30/23 1833     RBC, UA 3-5 /HPF      WBC, UA Too Numerous to Count /HPF      Bacteria, UA 4+ /HPF      Squamous Epithelial Cells, UA 3-6 /HPF      Hyaline  Casts, UA 0-2 /LPF      Methodology Automated Microscopy    Urinalysis With Culture If Indicated - Urine, Catheter [[id number]]  (Abnormal) Collected: 04/30/23 1807    Specimen: Urine, Catheter Updated: 04/30/23 1831     Color, UA Yellow     Appearance, UA Cloudy     pH, UA 7.0     Specific Gravity, UA 1.009     Glucose, UA Negative     Ketones, UA Negative     Bilirubin, UA Negative     Blood, UA Small (1+)     Protein, UA >=300 mg/dL (3+)     Leuk Esterase, UA Large (3+)     Nitrite, UA Negative     Urobilinogen, UA 0.2 E.U./dL    Narrative:      In absence of clinical symptoms, the presence of pyuria, bacteria, and/or nitrites on the urinalysis result does not correlate with infection.        Data Review:  Results from last 7 days   Lab Units 05/02/23  0550 05/01/23  0525 04/30/23  1806   SODIUM mmol/L 134* 132* 129*   POTASSIUM mmol/L 4.5 4.2 4.9   CHLORIDE mmol/L 98 98 92*   CO2 mmol/L 23.0 23.0 27.9   BUN mg/dL 48* 44* 45*   CREATININE mg/dL 1.79* 1.57* 1.45*   GLUCOSE mg/dL 207* 144* 145*   CALCIUM mg/dL 8.1* 8.3 9.1     Results from last 7 days   Lab Units 05/02/23  0550 05/01/23  0525 04/30/23  1806   WBC 10*3/mm3 6.62 9.79 13.73*   HEMOGLOBIN g/dL 10.1* 11.6* 12.3   HEMATOCRIT % 32.1* 35.5 36.8   PLATELETS 10*3/mm3 38* 51* 59*         Results from last 7 days   Lab Units 05/02/23  0550   HEMOGLOBIN A1C % 5.60     Lab Results   Lab Value Date/Time    TROPONINT 87 (C) 04/30/2023 2131    TROPONINT 77 (C) 04/30/2023 1806    TROPONINT 81 (C) 04/05/2023 0642    TROPONINT 98 (C) 04/05/2023 0453    TROPONINT 76 (C) 04/04/2023 1125    TROPONINT 67 (C) 03/07/2023 1441    TROPONINT 77 (C) 03/07/2023 1153    TROPONINT 0.052 (C) 12/20/2022 2113    TROPONINT 0.051 (C) 12/20/2022 1350    TROPONINT 0.010 08/07/2022 1046    TROPONINT <0.010 08/01/2022 1017    TROPONINT 0.017 07/31/2022 0920    TROPONINT 0.037 (C) 07/30/2022 1034    TROPONINT 0.034 (C) 03/04/2022 0645    TROPONINT 0.024 03/02/2022 1632         Results from  last 7 days   Lab Units 05/01/23  0525 04/30/23  1806   ALK PHOS U/L 72 66   BILIRUBIN mg/dL 0.5 0.5   ALT (SGPT) U/L 50* 39*   AST (SGOT) U/L 52* 38*         Results from last 7 days   Lab Units 05/02/23  0550   HEMOGLOBIN A1C % 5.60     Glucose   Date/Time Value Ref Range Status   05/02/2023 1057 279 (H) 70 - 130 mg/dL Final     Comment:     Meter: [id number] : [id number] Kaur Erwin NA   05/02/2023 0600 208 (H) 70 - 130 mg/dL Final     Comment:     Meter: [id number] : [id number] Paco Stallworth NA   05/01/2023 2114 251 (H) 70 - 130 mg/dL Final     Comment:     Meter: [id number] : [id number] Antoniowilson Stallworth NA   05/01/2023 1617 239 (H) 70 - 130 mg/dL Final     Comment:     Meter: [id number] : [id number] Galdino Monzon NA   05/01/2023 1121 272 (H) 70 - 130 mg/dL Final     Comment:     Meter: [id number] : [id number] Raza Machelle WINSOMEA   05/01/2023 0628 166 (H) 70 - 130 mg/dL Final     Comment:     Meter: [id number] : [id number] Camilo Stover NA   04/30/2023 2240 290 (H) 70 - 130 mg/dL Final     Comment:     Meter: [id number] : [id number] Arely Ramos RN           Past Medical History:   Diagnosis Date   • Aortic valve stenosis     s/p tissue AVR   • Back pain    • CKD (chronic kidney disease)    • Colitis due to Clostridioides difficile 01/26/2022   • Diastolic dysfunction     Grade 2 per echocardiogram 2013   • Diverticulosis    • Exertional shortness of breath    • Heart disease    • Hiatal hernia    • Hyperlipidemia    • Hypertension    • Hyperthyroidism    • Hypertriglyceridemia 05/31/2018   • Hypothyroidism    • L5 compression fracture (HCC) 08/2022   • Left ventricular hypertrophy    • Legally blind    • Liver disease    • Low back pain    • Macular degeneration    • Mitral regurgitation    • Osteoarthritis of hip    • Osteoporosis    • Pancreatitis 01/26/2022   • Paroxysmal atrial fibrillation    • Peripheral neuropathy    • Premature ventricular contractions    • Pulmonary  hypertension    • Renal insufficiency syndrome    • Type 2 diabetes mellitus    • Uterine cancer        Assessment:  Active Hospital Problems    Diagnosis  POA   • **Acute UTI [N39.0]  Yes   • Diabetic polyneuropathy associated with type 2 diabetes mellitus [E11.42]  Yes   • Other cirrhosis of liver [K74.69]  Yes   • Chronic heart failure with preserved ejection fraction [I50.32]  Yes   • Type 2 diabetes mellitus with hyperglycemia [E11.65]  Yes   • Gastroparesis [K31.84]  Yes   • Pulmonary hypertension [I27.20]  Yes   • Paroxysmal atrial fibrillation [I48.0]  Yes   • Legally blind [H54.8]  Yes   • Stage 4 chronic kidney disease [N18.4]  Yes      Resolved Hospital Problems   No resolved problems to display.       Plan:  Continue antibiotics.. Follow lab and await sensitivities. Maybe home tomorrow.  DC planning..    Juan Lennon MD  5/2/2023  14:13 EDT

## 2023-05-02 NOTE — CASE MANAGEMENT/SOCIAL WORK
Discharge Planning Assessment  [facility]     Patient Name: Helena Carmen  MRN: [id number]  Today's Date: 5/2/2023    Admit Date: 4/30/2023    Plan: home with family/24/7 care; current with [facility]   Discharge Needs Assessment     Row Name 05/02/23 1119       Living Environment    People in Home alone    Unique Family Situation 24/7 family care    Current Living Arrangements home    Primary Care Provided by self    Provides Primary Care For no one    Family Caregiver if Needed child(edison), adult;grandchild(edison), adult    Quality of Family Relationships helpful;involved    Able to Return to Prior Arrangements yes       Transition Planning    Patient/Family Anticipates Transition to home with family    Patient/Family Anticipated Services at Transition home health care    Transportation Anticipated family or friend will provide       Discharge Needs Assessment    Readmission Within the Last 30 Days no previous admission in last 30 days    Current Outpatient/Agency/Support Group homecare agency    Equipment Currently Used at Home wheelchair;walker, standard;grab bar;shower chair    Concerns to be Addressed discharge planning    Equipment Needed After Discharge none    Outpatient/Agency/Support Group Needs homecare agency    Discharge Facility/Level of Care Needs home with home health    Provided Post Acute Provider List? N/A               Discharge Plan     Row Name 05/02/23 1120       Plan    Plan home with family/24/7 care; current with [facility]    Patient/Family in Agreement with Plan yes    Plan Comments Spoke with pt and pt's son bedside. Confirmed facesheet correct. Explained role of CCP. Pt reports she lives alone but her granddaughter stays with her during the day and her children take turns staying with her at night so she has 24/7 care by family. She is current with [facility]. She has a walker, wheelchair, bath bench and cane at home. She has been to [place] and [place] in past. Plan is  for family to take pt home at d/c and continue her care at home. CCP to follow for additional d/c needs.              Continued Care and Services - Admitted Since 4/30/2023     Home Medical Care     Service Provider Request Status Selected Services Address Phone Fax Patient Preferred    [facility] Pending - Request Sent N/A [address], [address], [facility] [address] [phone] --            Selected Continued Care - Prior Encounters Includes continued care and service providers with selected services from prior encounters from 1/30/2023 to 5/2/2023    Discharged on 4/7/2023 Admission date: 4/4/2023 - Discharge [place] [facility]    [place] Care     Service Provider Selected Services Address Phone Fax Patient Preferred    [facility] [address] , [address], [facility] [address] [phone] --                Discharged on 3/14/2023 Admission date: 3/7/2023 - Discharge disposition: Skilled Nursing Facility (DC - External)    Destination     Service Provider Selected Services Address Phone Fax Patient Preferred    [facility] [address] [facility] [address] [phone] --          Home Medical Care     Service Provider Selected Services Address Phone Fax Patient Preferred    [facility] [address], [address], [facility] [address] [phone] --                    Expected Discharge Date and Time     Expected Discharge Date Expected Discharge Time    May 3, 2023          Demographic Summary    No documentation.                Functional Status    No documentation.                Psychosocial    No documentation.                Abuse/Neglect    No documentation.                Legal    No documentation.                Substance Abuse    No documentation.                Patient  Forms    No documentation.                   RADHA Landrum

## 2023-05-02 NOTE — THERAPY EVALUATION
Patient Name: Helena Carmen  : 1931    MRN: [id number]                              Today's Date: 2023       Admit Date: 2023    Visit Dx:     ICD-10-CM ICD-9-CM   1. Acute UTI  N39.0 599.0   2. Left arm pain  M79.602 729.5     Patient Active Problem List   Diagnosis   • Aortic valve stenosis   • Fatigue   • MI (mitral incompetence)   • PVC (premature ventricular contraction)   • Legally blind   • Essential hypertension   • Hypertriglyceridemia   • Pulmonary hypertension   • Paroxysmal atrial fibrillation   • Anxiety   • Stage 4 chronic kidney disease   • Chronic pain disorder   • Degeneration of lumbar intervertebral disc   • Type 2 diabetes mellitus with hyperglycemia   • Esophageal reflux   • Gastroparesis   • Hiatal hernia   • Iatrogenic hypothyroidism   • Macular degeneration   • Malignant neoplasm of uterus   • Osteoarthritis of knee   • Peripheral nerve disease   • Secondary hyperparathyroidism   • Thrombocytopenia   • Chronic heart failure with preserved ejection fraction   • Other cirrhosis of liver   • Hypothyroidism   • Nonrheumatic tricuspid valve regurgitation   • Anemia, chronic disease   • Hyponatremia   • Closed fracture of fifth lumbar vertebra   • Closed fracture of fifth lumbar vertebra, unspecified fracture morphology, initial encounter   • Diabetic polyneuropathy associated with type 2 diabetes mellitus   • Chronic ITP (idiopathic thrombocytopenia)   • Chronic midline low back pain with bilateral sciatica   • Acute deep vein thrombosis of calf, bilateral   • Acute left-sided low back pain with left-sided sciatica   • Urine retention   • Rib fracture   • Acute on chronic combined systolic and diastolic congestive heart failure   • Acute UTI   • Blindness right eye category 3, blindness left eye category 3   • Cognitive communication deficit   • Pseudomonas (aeruginosa) (mallei) (pseudomallei) as the cause of diseases classified elsewhere     Past Medical History:   Diagnosis Date    • Aortic valve stenosis     s/p tissue AVR   • Back pain    • CKD (chronic kidney disease)    • Colitis due to Clostridioides difficile 01/26/2022   • Diastolic dysfunction     Grade 2 per echocardiogram 2013   • Diverticulosis    • Exertional shortness of breath    • Heart disease    • Hiatal hernia    • Hyperlipidemia    • Hypertension    • Hyperthyroidism    • Hypertriglyceridemia 05/31/2018   • Hypothyroidism    • L5 compression fracture (HCC) 08/2022   • Left ventricular hypertrophy    • Legally blind    • Liver disease    • Low back pain    • Macular degeneration    • Mitral regurgitation    • Osteoarthritis of hip    • Osteoporosis    • Pancreatitis 01/26/2022   • Paroxysmal atrial fibrillation    • Peripheral neuropathy    • Premature ventricular contractions    • Pulmonary hypertension    • Renal insufficiency syndrome    • Type 2 diabetes mellitus    • Uterine cancer      Past Surgical History:   Procedure Laterality Date   • AORTIC VALVE REPAIR/REPLACEMENT     • CATARACT EXTRACTION      1970, 1999   • CHOLECYSTECTOMY     • ENDOSCOPY  08/15/2014    no gross lesions in stomach/duodenum, erythrematous mucosa in stomach   • EPIDURAL BLOCK     • HYSTERECTOMY  2007   • STERNOTOMY        General Information     Row Name 05/02/23 0946          Physical Therapy Time and Intention    Document Type evaluation  -PC     Mode of Treatment physical therapy  -PC     Row Name 05/02/23 0946          General Information    Patient Profile Reviewed yes  -PC     Prior Level of Function min assist:  pt has round the clock assist at home, states that she walks with a rolling walker and the caregiver is usually right beside her  -PC     Row Name 05/02/23 0946          Living Environment    People in Home alone  24/7 care  -PC     Row Name 05/02/23 0946          Cognition    Orientation Status (Cognition) oriented x 4  -PC     Row Name 05/02/23 0946          Safety Issues, Functional Mobility    Impairments Affecting Function  (Mobility) endurance/activity tolerance;strength;postural/trunk control  -PC           User Key  (r) = Recorded By, (t) = Taken By, (c) = Cosigned By    Initials Name Provider Type    PC Lucina Downs PT Physical Therapist               Mobility     Row Name 05/02/23 0948          Bed Mobility    Bed Mobility supine-sit;sit-supine  -PC     Supine-Sit Bent (Bed Mobility) minimum assist (75% patient effort)  -PC     Sit-Supine Bent (Bed Mobility) minimum assist (75% patient effort)  -PC     Row Name 05/02/23 0948          Sit-Stand Transfer    Sit-Stand Bent (Transfers) minimum assist (75% patient effort)  -PC     Assistive Device (Sit-Stand Transfers) walker, front-wheeled  -PC     Row Name 05/02/23 0948          Gait/Stairs (Locomotion)    Bent Level (Gait) minimum assist (75% patient effort);moderate assist (50% patient effort);2 person assist  -PC     Assistive Device (Gait) walker, front-wheeled  -PC     Distance in Feet (Gait) 20 ft  -PC     Deviations/Abnormal Patterns (Gait) paul decreased;gait speed decreased;festinating/shuffling  -PC     Bilateral Gait Deviations forward flexed posture  -PC     Comment, (Gait/Stairs) significantly forward flexed over walker  -PC           User Key  (r) = Recorded By, (t) = Taken By, (c) = Cosigned By    Initials Name Provider Type    PC Lucina Downs PT Physical Therapist               Obj/Interventions     Row Name 05/02/23 0949          Range of Motion Comprehensive    General Range of Motion bilateral lower extremity ROM WFL  -     Row Name 05/02/23 0949          Strength Comprehensive (MMT)    Comment, General Manual Muscle Testing (MMT) Assessment B UE 3/5, B LE 4/5  -     Row Name 05/02/23 0949          Balance    Balance Assessment sitting static balance;sitting dynamic balance;standing static balance;standing dynamic balance  -PC     Static Sitting Balance standby assist  -PC     Dynamic Sitting Balance supervision  -      Position, Sitting Balance sitting edge of bed  -PC     Static Standing Balance minimal assist  -PC     Dynamic Standing Balance minimal assist;moderate assist  -PC     Position/Device Used, Standing Balance walker, rolling  -PC           User Key  (r) = Recorded By, (t) = Taken By, (c) = Cosigned By    Initials Name Provider Type    PC Lucina Downs, PT Physical Therapist               Goals/Plan     Row Name 05/02/23 0955          Bed Mobility Goal 1 (PT)    Activity/Assistive Device (Bed Mobility Goal 1, PT) sit to supine/supine to sit  -PC     [place] Level/Cues Needed (Bed Mobility Goal 1, PT) contact guard required  -PC     Time Frame (Bed Mobility Goal 1, PT) 1 week  -PC     Row Name 05/02/23 0955          Transfer Goal 1 (PT)    Activity/Assistive Device (Transfer Goal 1, PT) sit-to-stand/stand-to-sit  -PC     [place] Level/Cues Needed (Transfer Goal 1, PT) contact guard required  -PC     Time Frame (Transfer Goal 1, PT) 1 week  -PC     Row Name 05/02/23 0955          Gait Training Goal 1 (PT)    Activity/Assistive Device (Gait Training Goal 1, PT) gait (walking locomotion);assistive device use  -PC     [place] Level (Gait Training Goal 1, PT) contact guard required  -PC     Distance (Gait Training Goal 1, PT) 30 ft  -PC     Time Frame (Gait Training Goal 1, PT) 1 week  -PC     Row Name 05/02/23 0955          Therapy Assessment/Plan (PT)    Planned Therapy Interventions (PT) bed mobility training;gait training;transfer training;strengthening  -PC           User Key  (r) = Recorded By, (t) = Taken By, (c) = Cosigned By    Initials Name Provider Type    PC Lucina Downs PT Physical Therapist               Clinical Impression     Row Name 05/02/23 0447          Pain    Pain Location - Side/Orientation Right  -PC     Pain Location - shoulder  -PC     Pre/Posttreatment Pain Comment did not rate pain, has had pain meds and has a pain patch on  -PC     Row Name 05/02/23 0972          Plan of  Care Review    Plan of Care Reviewed With patient  -PC     Outcome Evaluation Pt is a 89 yo female adm from home with UTI. Pt has an indwelling FC and has a compelx medical hx with frequent admissions, she is legally blind, she lives in a house with 24/7 care, reports that someone walks beside her for safety while walking with her walker in the home. Pt presents with impaired functional mobility and activity tolerance, she will benefit from PT to see while inpatient, is currently below her baseline, but has enough assist at home that she is appropriate to return home at discharge  -PC     Row Name 05/02/23 0950          Therapy Assessment/Plan (PT)    Rehab Potential (PT) fair, will monitor progress closely  -PC     Criteria for Skilled Interventions Met (PT) yes;meets criteria  -PC     Therapy Frequency (PT) 5 times/wk  -PC     Row Name 05/02/23 0950          Vital Signs    Pre SpO2 (%) 94  -PC     O2 Delivery Pre Treatment supplemental O2  -PC     Intra SpO2 (%) 94  -PC     O2 Delivery Intra Treatment room air  -PC     Post SpO2 (%) 94  -PC     O2 Delivery Post Treatment supplemental O2  -PC     Row Name 05/02/23 0950          Positioning and Restraints    Pre-Treatment Position in bed  -PC     Post Treatment Position bed  -PC     In Bed supine;call light within reach;encouraged to call for assist;exit alarm on  -PC           User Key  (r) = Recorded By, (t) = Taken By, (c) = Cosigned By    Initials Name Provider Type    PC Lucina Downs, PT Physical Therapist               Outcome Measures     Row Name 05/02/23 0955          How much help from another person do you currently need...    Turning from your back to your side while in flat bed without using bedrails? 2  -PC     Moving from lying on back to sitting on the side of a flat bed without bedrails? 3  -PC     Moving to and from a bed to a chair (including a wheelchair)? 2  -PC     Standing up from a chair using your arms (e.g., wheelchair, bedside chair)? 3   -PC     Climbing 3-5 steps with a railing? 1  -PC     To walk in hospital room? 2  -PC     AM-PAC 6 Clicks Score (PT) 13  -PC     Highest level of mobility 4 --> Transferred to chair/commode  -PC     Row Name 05/02/23 0955          Functional Assessment    Outcome Measure Options AM-PAC 6 Clicks Basic Mobility (PT)  -PC           User Key  (r) = Recorded By, (t) = Taken By, (c) = Cosigned By    Initials Name Provider Type    PC Lucina Downs PT Physical Therapist                             Physical Therapy Education     Title: PT OT SLP Therapies (Done)     Topic: Physical Therapy (Done)     Point: Mobility training (Done)     Learning Progress Summary           Patient Acceptance, E,D, DU by PC at 5/2/2023 0956                   Point: Home exercise program (Done)     Learning Progress Summary           Patient Acceptance, E,D, DU by PC at 5/2/2023 0956                   Point: Body mechanics (Done)     Learning Progress Summary           Patient Acceptance, E,D, DU by PC at 5/2/2023 0956                   Point: Precautions (Done)     Learning Progress Summary           Patient Acceptance, E,D, DU by PC at 5/2/2023 0956                               User Key     Initials Effective Dates Name Provider Type Discipline     06/16/21 -  Lucina Downs PT Physical Therapist PT              PT Recommendation and Plan  Planned Therapy Interventions (PT): bed mobility training, gait training, transfer training, strengthening  Plan of Care Reviewed With: patient  Outcome Evaluation: Pt is a 89 yo female adm from home with UTI. Pt has an indwelling FC and has a compelx medical hx with frequent admissions, she is legally blind, she lives in a house with 24/7 care, reports that someone walks beside her for safety while walking with her walker in the home. Pt presents with impaired functional mobility and activity tolerance, she will benefit from PT to see while inpatient, is currently below her baseline, but has  enough assist at home that she is appropriate to return home at discharge     Time Calculation:    PT Charges     Row Name 05/02/23 0957             Time Calculation    Start Time 0914  -PC      Stop Time 0936  -PC      Time Calculation (min) 22 min  -PC      PT Received On 05/02/23  -PC      PT - Next Appointment 05/03/23  -PC      PT Goal Re-Cert Due Date 05/09/23  -PC            User Key  (r) = Recorded By, (t) = Taken By, (c) = Cosigned By    Initials Name Provider Type    PC Lucina Downs, PT Physical Therapist              Therapy Charges for Today     Code Description Service Date Service Provider Modifiers Qty    80503278186 HC PT EVAL MOD COMPLEXITY 2 5/2/2023 Lucina Downs, PT GP 1    75825691349 HC PT THER PROC EA 15 MIN 5/2/2023 Lucina Downs, PT GP 1          PT G-Codes  Outcome Measure Options: AM-PAC 6 Clicks Basic Mobility (PT)  AM-PAC 6 Clicks Score (PT): 13  PT Discharge Summary  Anticipated Discharge Disposition (PT): home with assist    Lucina Downs, PT  5/2/2023

## 2023-05-02 NOTE — DISCHARGE PLACEMENT REQUEST
"Helena Carmen (89 y.o. Female)     Date of Birth   02/20/1931    Social Security Number       Address   [address] [place] [address]    Home Phone   [phone]    MRN   [id number]       Confucianist   Sabianist    Marital Status                               Admission Date   4/30/23    Admission Type   Emergency    Admitting Provider   Michael Allison MD    Attending Provider   Juan Lennon MD    Department, Room/Bed   [address], [address]1       Discharge Date       Discharge Disposition       Discharge Destination                               Attending Provider: Juan Lennon MD    Allergies: Baclofen, Erythromycin, Furosemide, Statins, Cephalexin, Penicillins, Sulfa Antibiotics    Isolation: None   Infection: None   Code Status: No CPR    Ht: 144.8 cm (57\")   Wt: 81 kg (178 lb 9.2 oz)    Admission Cmt: None   Principal Problem: Acute UTI [N39.0]                 Active Insurance as of 4/30/2023     Primary Coverage     Payor Plan Insurance Group Employer/Plan Group    MEDICARE MEDICARE A & B      Payor Plan Address Payor Plan Phone Number Payor Plan Fax Number Effective Dates    [address] [phone]  2/1/1996 - None Entered    [facility] [id number]       Subscriber Name Subscriber Birth Date Member ID       HELENA CARMEN 2/20/1931 [id number]           Secondary Coverage     Payor Plan Insurance Group Employer/Plan Group     FOR LIFE  FOR LIFE  SUP       Payor Plan Address Payor Plan Phone Number Payor Plan Fax Number Effective Dates    [address] [phone]  4/5/2016 - None Entered    [facility] [address]       Subscriber Name Subscriber Birth Date Member ID       HELENA CARMEN 2/20/1931 [id number]                 Emergency Contacts      (Rel.) Home Phone Work Phone Mobile Phone    QUAN CARMEN (Son) [phone] -- --    Radha Hoyos (Daughter) [phone] -- [phone]    BeronicaMargaret (Daughter) [phone] -- " [phone]    ARAM SPAIN (Grandchild) -- -- [phone]

## 2023-05-02 NOTE — PROGRESS NOTES
LOS: 1 day   Patient Care Team:  Mariah Hickman MD as PCP - General (Family Medicine)  Beth Butcher MD as Consulting Physician (Cardiology)  Rolando Wick MD as Consulting Physician (Nephrology)  Pablo Sherman Jr., MD as Consulting Physician (Hematology and Oncology)  Mango Arnold MD as Referring Physician (Internal Medicine)    Chief Complaint:     Following for history of HFpEF    Interval History:     She was found to have E. coli bacteremia with thrombocytopenia and sepsis related to this.    Objective   Vital Signs  Temp:  [97.7 °F (36.5 °C)-98 °F (36.7 °C)] 98 °F (36.7 °C)  Heart Rate:  [44-48] 48  Resp:  [18] 18  BP: (141-171)/(45-88) 164/88    Intake/Output Summary (Last 24 hours) at 5/2/2023 0858  Last data filed at 5/2/2023 0500  Gross per 24 hour   Intake --   Output 1050 ml   Net -1050 ml       Comfortable NAD  PERRL, conjunctivae clear  Neck supple, no JVD or thyromegaly appreciated  S1/S2 RRR, no m/r/g  Lungs CTA B, normal effort  Abdomen S/NT/ND (+) BS, no HSM appreciated  Extremities warm, no clubbing, cyanosis, or edema  Normal gait  No visible or palpable skin lesions  A/Ox4, mood and affect appropriate    Results Review:      Results from last 7 days   Lab Units 05/02/23  0550 05/01/23  0525 04/30/23  1806   SODIUM mmol/L 134* 132* 129*   POTASSIUM mmol/L 4.5 4.2 4.9   CHLORIDE mmol/L 98 98 92*   CO2 mmol/L 23.0 23.0 27.9   BUN mg/dL 48* 44* 45*   CREATININE mg/dL 1.79* 1.57* 1.45*   GLUCOSE mg/dL 207* 144* 145*   CALCIUM mg/dL 8.1* 8.3 9.1     Results from last 7 days   Lab Units 04/30/23 2131 04/30/23  1806   HSTROP T ng/L 87* 77*     Results from last 7 days   Lab Units 05/02/23  0550 05/01/23  0525 04/30/23  1806   WBC 10*3/mm3 6.62 9.79 13.73*   HEMOGLOBIN g/dL 10.1* 11.6* 12.3   HEMATOCRIT % 32.1* 35.5 36.8   PLATELETS 10*3/mm3 38* 51* 59*                       I reviewed the patient's new clinical results.  I personally viewed and interpreted the patient's  EKG/Telemetry data        Medication Review:   budesonide-formoterol, 2 puff, Inhalation, BID - RT  carvedilol, 3.125 mg, Oral, BID  cholecalciferol, 1,000 Units, Oral, Daily  famotidine, 40 mg, Oral, Nightly  furosemide, 40 mg, Oral, Daily  gabapentin, 600 mg, Oral, Q12H  hydrALAZINE, 50 mg, Oral, BID  HYDROcodone-acetaminophen, 1 tablet, Oral, Q6H  insulin glargine, 20 Units, Subcutaneous, Nightly  insulin lispro, 2-7 Units, Subcutaneous, TID With Meals  levoFLOXacin, 750 mg, Intravenous, Q48H  levothyroxine, 50 mcg, Oral, Q AM  lidocaine, 1 patch, Transdermal, Q24H  magnesium oxide, 200 mg, Oral, Daily  montelukast, 10 mg, Oral, Nightly  polyethylene glycol, 17 g, Oral, Daily  tamsulosin, 0.4 mg, Oral, Nightly             Assessment & Plan       Acute UTI    Legally blind    Pulmonary hypertension    Paroxysmal atrial fibrillation    Stage 4 chronic kidney disease    Type 2 diabetes mellitus with hyperglycemia    Gastroparesis    Chronic heart failure with preserved ejection fraction    Other cirrhosis of liver    Diabetic polyneuropathy associated with type 2 diabetes mellitus    1.  Acute myocardial injury  -Likely related to underlying heart disease/pulmonary hypertension in the setting of CKD and acute insults of the urinary tract infection.  - I reviewed her EKGs,There are nonspecific ST segment changes which appear unchanged from prior EKG dated April 18, 2023.  This troponin elevation does not suggest ACS  - Her cardiac status is relatively unchanged from prior evaluations it appears, no need for repeat cardiac testing at this time.     2.  Sinus bradycardia  3.  Hypertension  - Stable, asymptomatic  -Would recommend decreasing her carvedilol to 3.125 twice daily given the prominence of her sinus bradycardia.  This is decreased from 6.25 AM and 12.5 nightly dose  -Her bradycardia is asymptomatic, I would continue beta-blockade.  We may ultimately be able to increase back to home dose after sepsis resolves,  this may be transient bradycardia related to sepsis  -She has had worsening hypertension ever since the decrease of carvedilol, will start her back on amlodipine 5 and monitor closely.  She was previously taken off of this due to lower blood pressures and concerns for lower extremity swelling.  - Not a candidate for ACE or ARB right now due to NADER     3.  Hypothyroidism  - On levothyroxine     4. Severe aortic stenosis, status post AVR [place] Trifecta bovine   pericardial valve, 21 mm on 06/08/2013.  Previous gradients on echo performed a month ago were normal.  Nothing on her presentation to suggest worsening for now     5. Chronic diastolic heart failure: History of grade 2 diastolic dysfunction  -  She was admitted in December 2022 with acute on chronic HFpEF.  She required IV diuresis and was discharged home on 40 mg Lasix.  Of note she was not able to tolerate Jardiance or Farxiga due to UTI.  Spironolactone was also stopped due to NADER.   -Continue home dose diuretic.  Clinically on exam she does not appear to be newly volume overloaded.  -She previously had issues of leg swelling with amlodipine.  Resuming cautiously per above.     Thank you for the consult, cardiology will follow bradycardia.        Casey Thompson MD  05/02/23  08:58 EDT      Part of this note may be an electronic transcription/translation of spoken language to printed text using the Dragon Dictation System.

## 2023-05-03 LAB
ANION GAP SERPL CALCULATED.3IONS-SCNC: 8.3 MMOL/L (ref 5–15)
BACTERIA SPEC AEROBE CULT: ABNORMAL
BACTERIA SPEC AEROBE CULT: ABNORMAL
BASOPHILS # BLD AUTO: 0.02 10*3/MM3 (ref 0–0.2)
BASOPHILS NFR BLD AUTO: 0.3 % (ref 0–1.5)
BUN SERPL-MCNC: 49 MG/DL (ref 8–23)
BUN/CREAT SERPL: 32 (ref 7–25)
CALCIUM SPEC-SCNC: 8.5 MG/DL (ref 8.2–9.6)
CHLORIDE SERPL-SCNC: 101 MMOL/L (ref 98–107)
CO2 SERPL-SCNC: 23.7 MMOL/L (ref 22–29)
CREAT SERPL-MCNC: 1.53 MG/DL (ref 0.57–1)
DEPRECATED RDW RBC AUTO: 51.9 FL (ref 37–54)
EGFRCR SERPLBLD CKD-EPI 2021: 31.8 ML/MIN/1.73
EOSINOPHIL # BLD AUTO: 0.11 10*3/MM3 (ref 0–0.4)
EOSINOPHIL NFR BLD AUTO: 1.6 % (ref 0.3–6.2)
ERYTHROCYTE [DISTWIDTH] IN BLOOD BY AUTOMATED COUNT: 16 % (ref 12.3–15.4)
GLUCOSE BLDC GLUCOMTR-MCNC: 150 MG/DL (ref 70–130)
GLUCOSE BLDC GLUCOMTR-MCNC: 175 MG/DL (ref 70–130)
GLUCOSE BLDC GLUCOMTR-MCNC: 216 MG/DL (ref 70–130)
GLUCOSE BLDC GLUCOMTR-MCNC: 229 MG/DL (ref 70–130)
GLUCOSE SERPL-MCNC: 145 MG/DL (ref 65–99)
GRAM STN SPEC: ABNORMAL
GRAM STN SPEC: ABNORMAL
HCT VFR BLD AUTO: 32.9 % (ref 34–46.6)
HGB BLD-MCNC: 10.5 G/DL (ref 12–15.9)
IMM GRANULOCYTES # BLD AUTO: 0.03 10*3/MM3 (ref 0–0.05)
IMM GRANULOCYTES NFR BLD AUTO: 0.4 % (ref 0–0.5)
ISOLATED FROM: ABNORMAL
ISOLATED FROM: ABNORMAL
LYMPHOCYTES # BLD AUTO: 1.53 10*3/MM3 (ref 0.7–3.1)
LYMPHOCYTES NFR BLD AUTO: 22 % (ref 19.6–45.3)
MCH RBC QN AUTO: 28.5 PG (ref 26.6–33)
MCHC RBC AUTO-ENTMCNC: 31.9 G/DL (ref 31.5–35.7)
MCV RBC AUTO: 89.2 FL (ref 79–97)
MONOCYTES # BLD AUTO: 0.66 10*3/MM3 (ref 0.1–0.9)
MONOCYTES NFR BLD AUTO: 9.5 % (ref 5–12)
NEUTROPHILS NFR BLD AUTO: 4.59 10*3/MM3 (ref 1.7–7)
NEUTROPHILS NFR BLD AUTO: 66.2 % (ref 42.7–76)
NRBC BLD AUTO-RTO: 0 /100 WBC (ref 0–0.2)
PLATELET # BLD AUTO: 54 10*3/MM3 (ref 140–450)
PMV BLD AUTO: 12.1 FL (ref 6–12)
POTASSIUM SERPL-SCNC: 4.7 MMOL/L (ref 3.5–5.2)
RBC # BLD AUTO: 3.69 10*6/MM3 (ref 3.77–5.28)
SODIUM SERPL-SCNC: 133 MMOL/L (ref 136–145)
WBC NRBC COR # BLD: 6.94 10*3/MM3 (ref 3.4–10.8)

## 2023-05-03 PROCEDURE — 87040 BLOOD CULTURE FOR BACTERIA: CPT | Performed by: STUDENT IN AN ORGANIZED HEALTH CARE EDUCATION/TRAINING PROGRAM

## 2023-05-03 PROCEDURE — 94664 DEMO&/EVAL PT USE INHALER: CPT

## 2023-05-03 PROCEDURE — 63710000001 INSULIN LISPRO (HUMAN) PER 5 UNITS: Performed by: HOSPITALIST

## 2023-05-03 PROCEDURE — 99232 SBSQ HOSP IP/OBS MODERATE 35: CPT | Performed by: STUDENT IN AN ORGANIZED HEALTH CARE EDUCATION/TRAINING PROGRAM

## 2023-05-03 PROCEDURE — 94761 N-INVAS EAR/PLS OXIMETRY MLT: CPT

## 2023-05-03 PROCEDURE — 94799 UNLISTED PULMONARY SVC/PX: CPT

## 2023-05-03 PROCEDURE — 97110 THERAPEUTIC EXERCISES: CPT

## 2023-05-03 PROCEDURE — 85025 COMPLETE CBC W/AUTO DIFF WBC: CPT | Performed by: HOSPITALIST

## 2023-05-03 PROCEDURE — 80048 BASIC METABOLIC PNL TOTAL CA: CPT | Performed by: HOSPITALIST

## 2023-05-03 PROCEDURE — 82948 REAGENT STRIP/BLOOD GLUCOSE: CPT

## 2023-05-03 RX ORDER — HYDRALAZINE HYDROCHLORIDE 50 MG/1
50 TABLET, FILM COATED ORAL EVERY 8 HOURS SCHEDULED
Status: DISCONTINUED | OUTPATIENT
Start: 2023-05-03 | End: 2023-05-04

## 2023-05-03 RX ORDER — ASPIRIN 81 MG/1
81 TABLET ORAL DAILY
Status: DISCONTINUED | OUTPATIENT
Start: 2023-05-03 | End: 2023-05-04 | Stop reason: HOSPADM

## 2023-05-03 RX ADMIN — HYDROCODONE BITARTRATE AND ACETAMINOPHEN 1 TABLET: 7.5; 325 TABLET ORAL at 01:13

## 2023-05-03 RX ADMIN — AMLODIPINE BESYLATE 5 MG: 5 TABLET ORAL at 08:45

## 2023-05-03 RX ADMIN — ASPIRIN 81 MG: 81 TABLET, COATED ORAL at 11:28

## 2023-05-03 RX ADMIN — Medication 1000 UNITS: at 08:46

## 2023-05-03 RX ADMIN — INSULIN LISPRO 2 UNITS: 100 INJECTION, SOLUTION INTRAVENOUS; SUBCUTANEOUS at 08:46

## 2023-05-03 RX ADMIN — GABAPENTIN 600 MG: 300 CAPSULE ORAL at 20:12

## 2023-05-03 RX ADMIN — HYDRALAZINE HYDROCHLORIDE 50 MG: 50 TABLET, FILM COATED ORAL at 14:53

## 2023-05-03 RX ADMIN — MAGNESIUM OXIDE 400 MG (241.3 MG MAGNESIUM) TABLET 200 MG: TABLET at 08:46

## 2023-05-03 RX ADMIN — BUDESONIDE AND FORMOTEROL FUMARATE DIHYDRATE 2 PUFF: 160; 4.5 AEROSOL RESPIRATORY (INHALATION) at 07:25

## 2023-05-03 RX ADMIN — INSULIN LISPRO 3 UNITS: 100 INJECTION, SOLUTION INTRAVENOUS; SUBCUTANEOUS at 11:29

## 2023-05-03 RX ADMIN — GABAPENTIN 600 MG: 300 CAPSULE ORAL at 08:45

## 2023-05-03 RX ADMIN — MONTELUKAST SODIUM 10 MG: 10 TABLET, FILM COATED ORAL at 20:12

## 2023-05-03 RX ADMIN — TAMSULOSIN HYDROCHLORIDE 0.4 MG: 0.4 CAPSULE ORAL at 20:11

## 2023-05-03 RX ADMIN — HYDROCODONE BITARTRATE AND ACETAMINOPHEN 1 TABLET: 7.5; 325 TABLET ORAL at 20:12

## 2023-05-03 RX ADMIN — CARVEDILOL 3.12 MG: 3.12 TABLET, FILM COATED ORAL at 08:46

## 2023-05-03 RX ADMIN — LORAZEPAM 0.5 MG: 0.5 TABLET ORAL at 20:12

## 2023-05-03 RX ADMIN — INSULIN LISPRO 2 UNITS: 100 INJECTION, SOLUTION INTRAVENOUS; SUBCUTANEOUS at 17:11

## 2023-05-03 RX ADMIN — HYDROCODONE BITARTRATE AND ACETAMINOPHEN 1 TABLET: 7.5; 325 TABLET ORAL at 13:12

## 2023-05-03 RX ADMIN — HYDRALAZINE HYDROCHLORIDE 50 MG: 50 TABLET, FILM COATED ORAL at 22:30

## 2023-05-03 RX ADMIN — FUROSEMIDE 40 MG: 40 TABLET ORAL at 08:46

## 2023-05-03 RX ADMIN — LEVOTHYROXINE SODIUM 50 MCG: 0.05 TABLET ORAL at 06:16

## 2023-05-03 RX ADMIN — HYDROCODONE BITARTRATE AND ACETAMINOPHEN 1 TABLET: 7.5; 325 TABLET ORAL at 06:16

## 2023-05-03 RX ADMIN — CARVEDILOL 3.12 MG: 3.12 TABLET, FILM COATED ORAL at 20:11

## 2023-05-03 RX ADMIN — INSULIN GLARGINE-YFGN 20 UNITS: 100 INJECTION, SOLUTION SUBCUTANEOUS at 22:30

## 2023-05-03 RX ADMIN — BUDESONIDE AND FORMOTEROL FUMARATE DIHYDRATE 2 PUFF: 160; 4.5 AEROSOL RESPIRATORY (INHALATION) at 19:49

## 2023-05-03 RX ADMIN — FAMOTIDINE 40 MG: 20 TABLET, FILM COATED ORAL at 20:12

## 2023-05-03 RX ADMIN — POLYETHYLENE GLYCOL 3350 17 G: 17 POWDER, FOR SOLUTION ORAL at 08:45

## 2023-05-03 NOTE — PROGRESS NOTES
"DAILY PROGRESS NOTE  [facility]    Patient Identification:  Name: Helena Carmen  Age: 89 y.o.  Sex: female  :  1931  MRN: [id number]         Primary Care Physician: Mariah Hickman MD    Subjective:  Interval History:She is weak.    Objective:    Scheduled Meds:amLODIPine, 5 mg, Oral, Q24H  aspirin, 81 mg, Oral, Daily  budesonide-formoterol, 2 puff, Inhalation, BID - RT  carvedilol, 3.125 mg, Oral, BID  cholecalciferol, 1,000 Units, Oral, Daily  famotidine, 40 mg, Oral, Nightly  furosemide, 40 mg, Oral, Daily  gabapentin, 600 mg, Oral, Q12H  hydrALAZINE, 50 mg, Oral, Q8H  HYDROcodone-acetaminophen, 1 tablet, Oral, Q6H  insulin glargine, 20 Units, Subcutaneous, Nightly  insulin lispro, 2-7 Units, Subcutaneous, TID With Meals  levoFLOXacin, 750 mg, Intravenous, Q48H  levothyroxine, 50 mcg, Oral, Q AM  lidocaine, 1 patch, Transdermal, Q24H  magnesium oxide, 200 mg, Oral, Daily  montelukast, 10 mg, Oral, Nightly  polyethylene glycol, 17 g, Oral, Daily  tamsulosin, 0.4 mg, Oral, Nightly      Continuous Infusions:     Vital signs in last 24 hours:  Temp:  [97.6 °F (36.4 °C)-98.2 °F (36.8 °C)] 98 °F (36.7 °C)  Heart Rate:  [46-55] 53  Resp:  [16-20] 16  BP: (146-181)/(53-75) 165/55    Intake/Output:    Intake/Output Summary (Last 24 hours) at 5/3/2023 1537  Last data filed at 5/3/2023 1425  Gross per 24 hour   Intake 1080 ml   Output 1550 ml   Net -470 ml       Exam:  /55 (BP Location: Right arm, Patient Position: Lying)   Pulse 53   Temp 98 °F (36.7 °C) (Oral)   Resp 16   Ht 144.8 cm (57\")   Wt 81 kg (178 lb 9.2 oz)   SpO2 99%   BMI 38.64 kg/m²     General Appearance:    Alert, cooperative, no distress   Head:    Normocephalic, without obvious abnormality, atraumatic   Eyes:       Throat:   Lips, tongue, gums normal   Neck:   Supple, symmetrical, trachea midline, no JVD   Lungs:     Clear to auscultation bilaterally, respirations unlabored   Chest Wall:    No tenderness or " deformity    Heart:    Regular rate and rhythm, S1 and S2 normal, no murmur,no  Rub or gallop   Abdomen:     Soft, nontender, bowel sounds active, no masses, no organomegaly    Extremities:   Extremities normal, atraumatic, no cyanosis or edema   Pulses:      Skin:   Skin is warm and dry,  no rashes or palpable lesions   Neurologic:   She is weak.      Lab Results (last 72 hours)     Procedure Component Value Units Date/Time    POC Glucose Once [[id number]]  (Abnormal) Collected: 05/01/23 1121    Specimen: Blood Updated: 05/01/23 1126     Glucose 272 mg/dL      Comment: Meter: [id number] : [id number] Raza Carty CNA       Blood Culture - Blood, Arm, Left [[id number]]  (Abnormal) Collected: 04/30/23 1858    Specimen: Blood from Arm, Left Updated: 05/01/23 1059     Blood Culture Abnormal Stain     Gram Stain Aerobic Bottle Gram negative bacilli    STAT Lactic Acid, Reflex [[id number]]  (Normal) Collected: 05/01/23 1001    Specimen: Blood Updated: 05/01/23 1037     Lactate 1.8 mmol/L     Urine Culture - Urine, Urine, Catheter [[id number]]  (Abnormal) Collected: 04/30/23 1807    Specimen: Urine, Catheter Updated: 05/01/23 1033     Urine Culture >100,000 CFU/mL Gram Negative Bacilli    Narrative:      Colonization of the urinary tract without infection is common. Treatment is discouraged unless the patient is symptomatic, pregnant, or undergoing an invasive urologic procedure.    Blood Culture ID, PCR - Blood, Arm, Left [[id number]]  (Abnormal) Collected: 04/30/23 1806    Specimen: Blood from Arm, Left Updated: 05/01/23 0942     BCID, PCR Escherichia coli. Identification by BCID2 PCR.     BOTTLE TYPE Anaerobic Bottle    Narrative:      No resistance genes detected.    POC Glucose Once [[id number]]  (Abnormal) Collected: 05/01/23 0628    Specimen: Blood Updated: 05/01/23 0629     Glucose 166 mg/dL      Comment: Meter: [id number] : [id number] Camilo JOHNSON       Comprehensive Metabolic Panel [[id number]]  (Abnormal)  Collected: 05/01/23 0525    Specimen: Blood Updated: 05/01/23 0623     Glucose 144 mg/dL      BUN 44 mg/dL      Creatinine 1.57 mg/dL      Sodium 132 mmol/L      Potassium 4.2 mmol/L      Chloride 98 mmol/L      CO2 23.0 mmol/L      Calcium 8.3 mg/dL      Total Protein 5.7 g/dL      Albumin 3.1 g/dL      ALT (SGPT) 50 U/L      AST (SGOT) 52 U/L      Alkaline Phosphatase 72 U/L      Total Bilirubin 0.5 mg/dL      Globulin 2.6 gm/dL      A/G Ratio 1.2 g/dL      BUN/Creatinine Ratio 28.0     Anion Gap 11.0 mmol/L      eGFR 30.8 mL/min/1.73     Narrative:      GFR Normal >60  Chronic Kidney Disease <60  Kidney Failure <15    The GFR formula is only valid for adults with stable renal function between ages 18 and 70.    Blood Culture - Blood, Arm, Left [[id number]]  (Abnormal) Collected: 04/30/23 1806    Specimen: Blood from Arm, Left Updated: 05/01/23 0617     Blood Culture Abnormal Stain     Gram Stain Anaerobic Bottle Gram negative bacilli    CBC & Differential [[id number]]  (Abnormal) Collected: 05/01/23 0525    Specimen: Blood Updated: 05/01/23 0614    Narrative:      The following orders were created for panel order CBC & Differential.  Procedure                               Abnormality         Status                     ---------                               -----------         ------                     CBC Auto Differential[[id number]]        Abnormal            Final result                 Please view results for these tests on the individual orders.    CBC Auto Differential [[id number]]  (Abnormal) Collected: 05/01/23 0525    Specimen: Blood Updated: 05/01/23 0614     WBC 9.79 10*3/mm3      RBC 3.99 10*6/mm3      Hemoglobin 11.6 g/dL      Hematocrit 35.5 %      MCV 89.0 fL      MCH 29.1 pg      MCHC 32.7 g/dL      RDW 16.2 %      RDW-SD 52.4 fl      MPV 11.3 fL      Platelets 51 10*3/mm3      Neutrophil % 87.5 %      Lymphocyte % 4.4 %      Monocyte % 7.6 %      Eosinophil % 0.1 %      Basophil % 0.1 %       Immature Grans % 0.3 %      Neutrophils, Absolute 8.57 10*3/mm3      Lymphocytes, Absolute 0.43 10*3/mm3      Monocytes, Absolute 0.74 10*3/mm3      Eosinophils, Absolute 0.01 10*3/mm3      Basophils, Absolute 0.01 10*3/mm3      Immature Grans, Absolute 0.03 10*3/mm3      nRBC 0.1 /100 WBC     STAT Lactic Acid, Reflex [[id number]]  (Abnormal) Collected: 05/01/23 0320    Specimen: Blood Updated: 05/01/23 0404     Lactate 2.4 mmol/L     STAT Lactic Acid, Reflex [[id number]]  (Abnormal) Collected: 05/01/23 0016    Specimen: Blood Updated: 05/01/23 0111     Lactate 2.3 mmol/L     POC Glucose Once [[id number]]  (Abnormal) Collected: 04/30/23 2240    Specimen: Blood Updated: 04/30/23 2241     Glucose 290 mg/dL      Comment: Meter: [id number] : [id number] Arely Ramos RN       High Sensitivity Troponin T 2Hr [[id number]]  (Abnormal) Collected: 04/30/23 2131    Specimen: Blood Updated: 04/30/23 2214     HS Troponin T 87 ng/L      Troponin T Delta 10 ng/L     Narrative:      High Sensitive Troponin T Reference Range:  <10.0 ng/L- Negative Female for AMI  <15.0 ng/L- Negative Male for AMI  >=10 - Abnormal Female indicating possible myocardial injury.  >=15 - Abnormal Male indicating possible myocardial injury.   Clinicians would have to utilize clinical acumen, EKG, Troponin, and serial changes to determine if it is an Acute Myocardial Infarction or myocardial injury due to an underlying chronic condition.         STAT Lactic Acid, Reflex [[id number]]  (Abnormal) Collected: 04/30/23 2131    Specimen: Blood Updated: 04/30/23 2213     Lactate 2.9 mmol/L     Respiratory Panel PCR w/COVID-19(SARS-CoV-2) LATONIA/CHARLIE/ALEJANDRO/PAD/COR/MAD/PASQUALE In-House, NP Swab in [facility], 3-4 HR TAT - Swab, Nasopharynx [[id number]]  (Normal) Collected: 04/30/23 1807    Specimen: Swab from Nasopharynx Updated: 04/30/23 1919     ADENOVIRUS, PCR Not Detected     Coronavirus 229E Not Detected     Coronavirus HKU1 Not Detected     Coronavirus NL63 Not Detected      Coronavirus OC43 Not Detected     COVID19 Not Detected     Human Metapneumovirus Not Detected     Human Rhinovirus/Enterovirus Not Detected     Influenza A PCR Not Detected     Influenza B PCR Not Detected     Parainfluenza Virus 1 Not Detected     Parainfluenza Virus 2 Not Detected     Parainfluenza Virus 3 Not Detected     Parainfluenza Virus 4 Not Detected     RSV, PCR Not Detected     Bordetella pertussis pcr Not Detected     Bordetella parapertussis PCR Not Detected     Chlamydophila pneumoniae PCR Not Detected     Mycoplasma pneumo by PCR Not Detected    Narrative:      In the setting of a positive respiratory panel with a viral infection PLUS a negative procalcitonin without other underlying concern for bacterial infection, consider observing off antibiotics or discontinuation of antibiotics and continue supportive care. If the respiratory panel is positive for atypical bacterial infection (Bordetella pertussis, Chlamydophila pneumoniae, or Mycoplasma pneumoniae), consider antibiotic de-escalation to target atypical bacterial infection.    Lactic Acid, Plasma [[id number]]  (Abnormal) Collected: 04/30/23 1806    Specimen: Blood Updated: 04/30/23 1902     Lactate 2.4 mmol/L     Single High Sensitivity Troponin T [[id number]]  (Abnormal) Collected: 04/30/23 1806    Specimen: Blood Updated: 04/30/23 1901     HS Troponin T 77 ng/L     Narrative:      High Sensitive Troponin T Reference Range:  <10.0 ng/L- Negative Female for AMI  <15.0 ng/L- Negative Male for AMI  >=10 - Abnormal Female indicating possible myocardial injury.  >=15 - Abnormal Male indicating possible myocardial injury.   Clinicians would have to utilize clinical acumen, EKG, Troponin, and serial changes to determine if it is an Acute Myocardial Infarction or myocardial injury due to an underlying chronic condition.         BNP [[id number]]  (Abnormal) Collected: 04/30/23 1806    Specimen: Blood Updated: 04/30/23 1900     proBNP 3,129.0 pg/mL      "Narrative:      Among patients with dyspnea, NT-proBNP is highly sensitive for the detection of acute congestive heart failure. In addition NT-proBNP of <300 pg/ml effectively rules out acute congestive heart failure with 99% negative predictive value.    Results may be falsely decreased if patient taking Biotin.      Procalcitonin [[id number]]  (Abnormal) Collected: 04/30/23 1806    Specimen: Blood Updated: 04/30/23 1900     Procalcitonin 0.33 ng/mL     Narrative:      As a Marker for Sepsis (Non-Neonates):    1. <0.5 ng/mL represents a low risk of severe sepsis and/or septic shock.  2. >2 ng/mL represents a high risk of severe sepsis and/or septic shock.    As a Marker for Lower Respiratory Tract Infections that require antibiotic therapy:    PCT on Admission    Antibiotic Therapy       6-12 Hrs later    >0.5                Strongly Recommended  >0.25 - <0.5        Recommended   0.1 - 0.25          Discouraged              Remeasure/reassess PCT  <0.1                Strongly Discouraged     Remeasure/reassess PCT    As 28 day mortality risk marker: \"Change in Procalcitonin Result\" (>80% or <=80%) if Day 0 (or Day 1) and Day 4 values are available. Refer to [email or web address]    Change in PCT <=80%  A decrease of PCT levels below or equal to 80% defines a positive change in PCT test result representing a higher risk for 28-day all-cause mortality of patients diagnosed with severe sepsis for septic shock.    Change in PCT >80%  A decrease of PCT levels of more than 80% defines a negative change in PCT result representing a lower risk for 28-day all-cause mortality of patients diagnosed with severe sepsis or septic shock.       Comprehensive Metabolic Panel [[id number]]  (Abnormal) Collected: 04/30/23 1806    Specimen: Blood Updated: 04/30/23 1854     Glucose 145 mg/dL      BUN 45 mg/dL      Creatinine 1.45 mg/dL      Sodium 129 mmol/L      Potassium 4.9 mmol/L      Chloride 92 mmol/L      CO2 27.9 " mmol/L      Calcium 9.1 mg/dL      Total Protein 6.4 g/dL      Albumin 3.3 g/dL      ALT (SGPT) 39 U/L      AST (SGOT) 38 U/L      Alkaline Phosphatase 66 U/L      Total Bilirubin 0.5 mg/dL      Globulin 3.1 gm/dL      A/G Ratio 1.1 g/dL      BUN/Creatinine Ratio 31.0     Anion Gap 9.1 mmol/L      eGFR 33.9 mL/min/1.73     Narrative:      GFR Normal >60  Chronic Kidney Disease <60  Kidney Failure <15    The GFR formula is only valid for adults with stable renal function between ages 18 and 70.    CBC & Differential [[id number]]  (Abnormal) Collected: 04/30/23 1806    Specimen: Blood Updated: 04/30/23 1844    Narrative:      The following orders were created for panel order CBC & Differential.  Procedure                               Abnormality         Status                     ---------                               -----------         ------                     CBC Auto Differential[[id number]]        Abnormal            Final result                 Please view results for these tests on the individual orders.    CBC Auto Differential [[id number]]  (Abnormal) Collected: 04/30/23 1806    Specimen: Blood Updated: 04/30/23 1844     WBC 13.73 10*3/mm3      RBC 4.17 10*6/mm3      Hemoglobin 12.3 g/dL      Hematocrit 36.8 %      MCV 88.2 fL      MCH 29.5 pg      MCHC 33.4 g/dL      RDW 15.9 %      RDW-SD 51.3 fl      MPV 12.0 fL      Platelets 59 10*3/mm3      Neutrophil % 92.6 %      Lymphocyte % 2.8 %      Monocyte % 4.0 %      Eosinophil % 0.1 %      Basophil % 0.1 %      Neutrophils, Absolute 12.72 10*3/mm3      Lymphocytes, Absolute 0.38 10*3/mm3      Monocytes, Absolute 0.55 10*3/mm3      Eosinophils, Absolute 0.01 10*3/mm3      Basophils, Absolute 0.02 10*3/mm3     Urinalysis, Microscopic Only - Urine, Catheter [[id number]]  (Abnormal) Collected: 04/30/23 1807    Specimen: Urine, Catheter Updated: 04/30/23 1833     RBC, UA 3-5 /HPF      WBC, UA Too Numerous to Count /HPF      Bacteria, UA 4+ /HPF      Squamous  Epithelial Cells, UA 3-6 /HPF      Hyaline Casts, UA 0-2 /LPF      Methodology Automated Microscopy    Urinalysis With Culture If Indicated - Urine, Catheter [[id number]]  (Abnormal) Collected: 04/30/23 1807    Specimen: Urine, Catheter Updated: 04/30/23 1831     Color, UA Yellow     Appearance, UA Cloudy     pH, UA 7.0     Specific Gravity, UA 1.009     Glucose, UA Negative     Ketones, UA Negative     Bilirubin, UA Negative     Blood, UA Small (1+)     Protein, UA >=300 mg/dL (3+)     Leuk Esterase, UA Large (3+)     Nitrite, UA Negative     Urobilinogen, UA 0.2 E.U./dL    Narrative:      In absence of clinical symptoms, the presence of pyuria, bacteria, and/or nitrites on the urinalysis result does not correlate with infection.        Data Review:  Results from last 7 days   Lab Units 05/03/23  0608 05/02/23  0550 05/01/23  0525   SODIUM mmol/L 133* 134* 132*   POTASSIUM mmol/L 4.7 4.5 4.2   CHLORIDE mmol/L 101 98 98   CO2 mmol/L 23.7 23.0 23.0   BUN mg/dL 49* 48* 44*   CREATININE mg/dL 1.53* 1.79* 1.57*   GLUCOSE mg/dL 145* 207* 144*   CALCIUM mg/dL 8.5 8.1* 8.3     Results from last 7 days   Lab Units 05/03/23  0608 05/02/23  0550 05/01/23  0525   WBC 10*3/mm3 6.94 6.62 9.79   HEMOGLOBIN g/dL 10.5* 10.1* 11.6*   HEMATOCRIT % 32.9* 32.1* 35.5   PLATELETS 10*3/mm3 54* 38* 51*         Results from last 7 days   Lab Units 05/02/23  0550   HEMOGLOBIN A1C % 5.60     Lab Results   Lab Value Date/Time    TROPONINT 87 (C) 04/30/2023 2131    TROPONINT 77 (C) 04/30/2023 1806    TROPONINT 81 (C) 04/05/2023 0642    TROPONINT 98 (C) 04/05/2023 0453    TROPONINT 76 (C) 04/04/2023 1125    TROPONINT 67 (C) 03/07/2023 1441    TROPONINT 77 (C) 03/07/2023 1153    TROPONINT 0.052 (C) 12/20/2022 2113    TROPONINT 0.051 (C) 12/20/2022 1350    TROPONINT 0.010 08/07/2022 1046    TROPONINT <0.010 08/01/2022 1017    TROPONINT 0.017 07/31/2022 0920    TROPONINT 0.037 (C) 07/30/2022 1034    TROPONINT 0.034 (C) 03/04/2022 0645    TROPONINT  0.024 03/02/2022 1632         Results from last 7 days   Lab Units 05/01/23  0525 04/30/23  1806   ALK PHOS U/L 72 66   BILIRUBIN mg/dL 0.5 0.5   ALT (SGPT) U/L 50* 39*   AST (SGOT) U/L 52* 38*         Results from last 7 days   Lab Units 05/02/23  0550   HEMOGLOBIN A1C % 5.60     Glucose   Date/Time Value Ref Range Status   05/03/2023 1044 216 (H) 70 - 130 mg/dL Final     Comment:     Meter: [id number] : [id number] Tj Suarez NA   05/03/2023 0610 150 (H) 70 - 130 mg/dL Final     Comment:     Meter: [id number] : [id number] CarlosYaawaqar García NA   05/02/2023 2043 242 (H) 70 - 130 mg/dL Final     Comment:     Meter: [id number] : [id number] Arely Ramos RN   05/02/2023 1610 230 (H) 70 - 130 mg/dL Final     Comment:     Meter: [id number] : [id number] Kaur Rip NA   05/02/2023 1057 279 (H) 70 - 130 mg/dL Final     Comment:     Meter: [id number] : [id number] Kaur Rip NA   05/02/2023 0600 208 (H) 70 - 130 mg/dL Final     Comment:     Meter: [id number] : [id number] Paco Stallworth NA   05/01/2023 2114 251 (H) 70 - 130 mg/dL Final     Comment:     Meter: [id number] : [id number] Paco Museberly NA   05/01/2023 1617 239 (H) 70 - 130 mg/dL Final     Comment:     Meter: [id number] : [id number] Galdino JOHNSON           Past Medical History:   Diagnosis Date   • Aortic valve stenosis     s/p tissue AVR   • Back pain    • CKD (chronic kidney disease)    • Colitis due to Clostridioides difficile 01/26/2022   • Diastolic dysfunction     Grade 2 per echocardiogram 2013   • Diverticulosis    • Exertional shortness of breath    • Heart disease    • Hiatal hernia    • Hyperlipidemia    • Hypertension    • Hyperthyroidism    • Hypertriglyceridemia 05/31/2018   • Hypothyroidism    • L5 compression fracture (HCC) 08/2022   • Left ventricular hypertrophy    • Legally blind    • Liver disease    • Low back pain    • Macular degeneration    • Mitral regurgitation    • Osteoarthritis of hip     • Osteoporosis    • Pancreatitis 01/26/2022   • Paroxysmal atrial fibrillation    • Peripheral neuropathy    • Premature ventricular contractions    • Pulmonary hypertension    • Renal insufficiency syndrome    • Type 2 diabetes mellitus    • Uterine cancer        Assessment:  Active Hospital Problems    Diagnosis  POA   • **Acute UTI [N39.0]  Yes   • Diabetic polyneuropathy associated with type 2 diabetes mellitus [E11.42]  Yes   • Other cirrhosis of liver [K74.69]  Yes   • Chronic heart failure with preserved ejection fraction [I50.32]  Yes   • Type 2 diabetes mellitus with hyperglycemia [E11.65]  Yes   • Gastroparesis [K31.84]  Yes   • Pulmonary hypertension [I27.20]  Yes   • Paroxysmal atrial fibrillation [I48.0]  Yes   • Legally blind [H54.8]  Yes   • Stage 4 chronic kidney disease [N18.4]  Yes      Resolved Hospital Problems   No resolved problems to display.       Plan:  Continue antibiotics.. Follow lab and await sensitivities. Maybe home tomorrow.  DC planning..Urology consult for retention    Juan Lennon MD  5/3/2023  15:37 EDT

## 2023-05-03 NOTE — CONSULTS
FIRST UROLOGY CONSULT      Patient Identification:  NAME:  Helena Carmen  Age:  89 y.o.   Sex:  female   :  1931   MRN:  [id number]       Chief complaint: Abdominal pain    History of present illness: 89-year-old female with urinary retention.  She sees my partner Dr. Webber.  She has had multiple trials to void and has had recurring retention.  Plan was leave her catheter and give her a voiding trial when her strength improved after recent prolonged hospitalization.  I saw her last fall for similar issues after she had been admitted for COVID.  Her family is able to do intermittent catheterization but they have elected to leave her catheter in for now.  She comes in now with lower urinary tract infection currently being treated and likely to be discharged tomorrow.      Past medical history:  Past Medical History:   Diagnosis Date   • Aortic valve stenosis     s/p tissue AVR   • Back pain    • CKD (chronic kidney disease)    • Colitis due to Clostridioides difficile 2022   • Diastolic dysfunction     Grade 2 per echocardiogram    • Diverticulosis    • Exertional shortness of breath    • Heart disease    • Hiatal hernia    • Hyperlipidemia    • Hypertension    • Hyperthyroidism    • Hypertriglyceridemia 2018   • Hypothyroidism    • L5 compression fracture (HCC) 2022   • Left ventricular hypertrophy    • Legally blind    • Liver disease    • Low back pain    • Macular degeneration    • Mitral regurgitation    • Osteoarthritis of hip    • Osteoporosis    • Pancreatitis 2022   • Paroxysmal atrial fibrillation    • Peripheral neuropathy    • Premature ventricular contractions    • Pulmonary hypertension    • Renal insufficiency syndrome    • Type 2 diabetes mellitus    • Uterine cancer        Past surgical history:  Past Surgical History:   Procedure Laterality Date   • AORTIC VALVE REPAIR/REPLACEMENT     • CATARACT EXTRACTION      ,    • CHOLECYSTECTOMY     •  ENDOSCOPY  08/15/2014    no gross lesions in stomach/duodenum, erythrematous mucosa in stomach   • EPIDURAL BLOCK     • HYSTERECTOMY  2007   • STERNOTOMY         Allergies:  Baclofen, Erythromycin, Furosemide, Statins, Cephalexin, Penicillins, and Sulfa antibiotics    Home medications:  Medications Prior to Admission   Medication Sig Dispense Refill Last Dose   • bismuth subsalicylate (PEPTO BISMOL) 262 MG/15ML suspension Take 30 mL by mouth Every 6 (Six) Hours As Needed for Indigestion.   Past Week   • calcium carbonate (TUMS) 500 MG chewable tablet Chew 2 tablets 3 (Three) Times a Day As Needed for Indigestion or Heartburn.   Past Week   • carvedilol (COREG) 12.5 MG tablet Take 1 tablet by mouth 2 (Two) Times a Day. 1/2 tab in am and full tab in evening 60 tablet 11 4/30/2023   • cholecalciferol (VITAMIN D3) 25 MCG (1000 UT) tablet Take 1 tablet by mouth Daily.   4/30/2023   • famotidine (PEPCID) 40 MG tablet Take 1 tablet by mouth Every Night. 30 tablet 0 4/29/2023   • fluticasone-salmeterol (Advair HFA) 115-21 MCG/ACT inhaler Inhale 2 puffs 2 (Two) Times a Day. 12 each 11 4/30/2023   • furosemide (LASIX) 40 MG tablet Take 1 tablet by mouth Daily.   4/30/2023   • gabapentin (NEURONTIN) 600 MG tablet Take 1 tablet by mouth 2 (Two) Times a Day. 6 tablet 0 4/30/2023   • hydrALAZINE (APRESOLINE) 25 MG tablet Take 2 tablets by mouth 2 (Two) Times a Day.   4/30/2023   • HYDROcodone-acetaminophen (NORCO) 7.5-325 MG per tablet Take 1 tablet by mouth Every 6 (Six) Hours. 12 tablet 0 4/30/2023   • insulin glargine (LANTUS, SEMGLEE) 100 UNIT/ML injection Inject 20 Units under the skin into the appropriate area as directed Every Night.   4/29/2023   • insulin lispro (ADMELOG) 100 UNIT/ML injection Inject 0-9 Units under the skin into the appropriate area as directed 3 (Three) Times a Day Before Meals.  12 4/30/2023   • levothyroxine (SYNTHROID, LEVOTHROID) 50 MCG tablet Take 1 tablet by mouth Every Morning.   4/30/2023   •  lidocaine (LIDODERM) 5 % Place 1 patch on the skin as directed by provider Daily. Remove & Discard patch within 12 hours or as directed by MD 6 each 0 Past Week   • LORazepam (ATIVAN) 0.5 MG tablet Take 1 tablet by mouth Every 8 (Eight) Hours As Needed for Anxiety. 9 tablet 0 4/29/2023   • magnesium oxide (MAG-OX) 400 MG tablet Take 0.5 tablets by mouth Daily. 45 tablet 3 4/30/2023   • montelukast (SINGULAIR) 10 MG tablet Take 1 tablet by mouth Every Night.   4/29/2023   • ondansetron (ZOFRAN) 8 MG tablet Take 1 tablet by mouth As Needed.   4/29/2023   • polyethylene glycol (MIRALAX) 17 GM/SCOOP powder Take 17 g by mouth Daily.   4/30/2023   • predniSONE (DELTASONE) 10 MG tablet Take 1 tablet by mouth Daily. 30 tablet 2 4/30/2023   • tamsulosin (FLOMAX) 0.4 MG capsule 24 hr capsule Take 1 capsule by mouth every night at bedtime.   4/30/2023       Hospital medications:  amLODIPine, 5 mg, Oral, Q24H  aspirin, 81 mg, Oral, Daily  budesonide-formoterol, 2 puff, Inhalation, BID - RT  carvedilol, 3.125 mg, Oral, BID  cholecalciferol, 1,000 Units, Oral, Daily  famotidine, 40 mg, Oral, Nightly  furosemide, 40 mg, Oral, Daily  gabapentin, 600 mg, Oral, Q12H  hydrALAZINE, 50 mg, Oral, Q8H  HYDROcodone-acetaminophen, 1 tablet, Oral, Q6H  insulin glargine, 20 Units, Subcutaneous, Nightly  insulin lispro, 2-7 Units, Subcutaneous, TID With Meals  levoFLOXacin, 750 mg, Intravenous, Q48H  levothyroxine, 50 mcg, Oral, Q AM  lidocaine, 1 patch, Transdermal, Q24H  magnesium oxide, 200 mg, Oral, Daily  montelukast, 10 mg, Oral, Nightly  polyethylene glycol, 17 g, Oral, Daily  tamsulosin, 0.4 mg, Oral, Nightly         •  bismuth subsalicylate  •  calcium carbonate  •  dextrose  •  dextrose  •  glucagon (human recombinant)  •  LORazepam  •  [COMPLETED] Insert Peripheral IV **AND** sodium chloride    Family history:  Family History   Problem Relation Age of Onset   • Heart disease Mother    • Hypertension Mother    • Stroke Mother    •  Diabetes Mother         mellitus   • Other Other         cardiovascular disorder       Social history:  Social History     Tobacco Use   • Smoking status: Former     Packs/day: 0.50     Types: Cigarettes     Quit date: 7/10/1969     Years since quittin.8   • Smokeless tobacco: Never   Vaping Use   • Vaping Use: Never used   Substance Use Topics   • Alcohol use: No     Comment: caffeine use - coffee 2 cups daily   • Drug use: No       Review of systems:    Negative 12-system ROS except for the following: As above      Objective:  TMax 24 hours:   Temp (24hrs), Av.9 °F (36.6 °C), Min:97.6 °F (36.4 °C), Max:98.2 °F (36.8 °C)      Vitals Ranges:   Temp:  [97.6 °F (36.4 °C)-98.2 °F (36.8 °C)] 98 °F (36.7 °C)  Heart Rate:  [46-55] 53  Resp:  [16-20] 16  BP: (146-181)/(53-71) 165/55    Intake/Output Last 3 shifts:  I/O last 3 completed shifts:  In: 600 [P.O.:600]  Out: 2600 [Urine:2600]     Physical Exam:       General Appearance:    Alert, cooperative, in no acute distress   Head:    Normocephalic, without obvious abnormality, atraumatic   Eyes:          PERRL, conjunctivae and corneas clear   Ears:    Normal external inspection   Throat:   No oral lesions, oral mucosa moist   Neck:   Supple, no LAD, trachea midline   Back:     No CVA tenderness   Lungs:     Respirations unlabored, symmetric excursion    Heart:    RRR, intact peripheral pulses   Abdomen:    Soft obese benign   :   Deferred   Extremities:   No edema, no deformity   Skin:   No bleeding, bruising or rashes   Neuro/Psych:   Orientation intact, mood/affect pleasant, no focal findings       Results review:   I reviewed the patient's new clinical results.    Data review:  Lab Results (last 24 hours)     Procedure Component Value Units Date/Time    POC Glucose Once [[id number]]  (Abnormal) Collected: 23    Specimen: Blood Updated: 23     Glucose 175 mg/dL      Comment: Meter: [id number] : [id number] Tj JOHNSON        Blood Culture - Blood, Hand, Right [[id number]] Collected: 05/03/23 1110    Specimen: Blood from Hand, Right Updated: 05/03/23 1125    Blood Culture - Blood, Hand, Left [[id number]] Collected: 05/03/23 1119    Specimen: Blood from Hand, Left Updated: 05/03/23 1125    POC Glucose Once [[id number]]  (Abnormal) Collected: 05/03/23 1044    Specimen: Blood Updated: 05/03/23 1045     Glucose 216 mg/dL      Comment: Meter: [id number] : [id number] Tj JOHNSON       CBC & Differential [[id number]]  (Abnormal) Collected: 05/03/23 0608    Specimen: Blood Updated: 05/03/23 0830    Narrative:      The following orders were created for panel order CBC & Differential.  Procedure                               Abnormality         Status                     ---------                               -----------         ------                     CBC Auto Differential[[id number]]        Abnormal            Final result                 Please view results for these tests on the individual orders.    CBC Auto Differential [[id number]]  (Abnormal) Collected: 05/03/23 0608    Specimen: Blood Updated: 05/03/23 0830     WBC 6.94 10*3/mm3      RBC 3.69 10*6/mm3      Hemoglobin 10.5 g/dL      Hematocrit 32.9 %      MCV 89.2 fL      MCH 28.5 pg      MCHC 31.9 g/dL      RDW 16.0 %      RDW-SD 51.9 fl      MPV 12.1 fL      Platelets 54 10*3/mm3      Neutrophil % 66.2 %      Lymphocyte % 22.0 %      Monocyte % 9.5 %      Eosinophil % 1.6 %      Basophil % 0.3 %      Immature Grans % 0.4 %      Neutrophils, Absolute 4.59 10*3/mm3      Lymphocytes, Absolute 1.53 10*3/mm3      Monocytes, Absolute 0.66 10*3/mm3      Eosinophils, Absolute 0.11 10*3/mm3      Basophils, Absolute 0.02 10*3/mm3      Immature Grans, Absolute 0.03 10*3/mm3      nRBC 0.0 /100 WBC     Basic Metabolic Panel [[id number]]  (Abnormal) Collected: 05/03/23 0608    Specimen: Blood Updated: 05/03/23 0705     Glucose 145 mg/dL      BUN 49 mg/dL      Creatinine 1.53 mg/dL      Sodium  133 mmol/L      Potassium 4.7 mmol/L      Chloride 101 mmol/L      CO2 23.7 mmol/L      Calcium 8.5 mg/dL      BUN/Creatinine Ratio 32.0     Anion Gap 8.3 mmol/L      eGFR 31.8 mL/min/1.73     Narrative:      GFR Normal >60  Chronic Kidney Disease <60  Kidney Failure <15    The GFR formula is only valid for adults with stable renal function between ages 18 and 70.    Blood Culture - Blood, Arm, Left [[id number]]  (Abnormal) Collected: 04/30/23 1858    Specimen: Blood from Arm, Left Updated: 05/03/23 0636     Blood Culture Escherichia coli     Isolated from Aerobic Bottle     Gram Stain Aerobic Bottle Gram negative bacilli    Narrative:      Refer to previous blood culture collected on 04/30/2023 at 1806 for MICs.      Blood Culture - Blood, Arm, Left [[id number]]  (Abnormal)  (Susceptibility) Collected: 04/30/23 1806    Specimen: Blood from Arm, Left Updated: 05/03/23 0629     Blood Culture Escherichia coli     Isolated from Anaerobic Bottle     Gram Stain Anaerobic Bottle Gram negative bacilli    Susceptibility      Escherichia coli      EFFIE      Ampicillin Resistant     Ampicillin + Sulbactam Intermediate      Cefepime Susceptible      Ceftazidime Susceptible      Ceftriaxone Susceptible      Gentamicin Susceptible      Levofloxacin Susceptible      Piperacillin + Tazobactam Susceptible      Trimethoprim + Sulfamethoxazole Resistant                      Susceptibility Comments     Escherichia coli    Cefazolin sensitivity will not be reported for Enterobacteriaceae in non-urine isolates. If cefazolin is preferred, please call the microbiology lab to request an E-test.  With the exception of urinary-sourced infections, aminoglycosides should not be used as monotherapy.             POC Glucose Once [[id number]]  (Abnormal) Collected: 05/03/23 0610    Specimen: Blood Updated: 05/03/23 0611     Glucose 150 mg/dL      Comment: Meter: [id number] : [id number] Asmita JOHNSON       POC Glucose Once [[id number]]   (Abnormal) Collected: 05/02/23 2043    Specimen: Blood Updated: 05/02/23 2044     Glucose 242 mg/dL      Comment: Meter: [id number] : [id number] Arely Ramos RN              Imaging:  Imaging Results (Last 24 Hours)     ** No results found for the last 24 hours. 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    Plan:     Due to discharge with her catheter and will have her follow-up with Dr. Mayank Webber in our office in 1 to 2 weeks for voiding trial and reinstitution of intermittent catheterization by the family.    Hamzah Darling MD  05/03/23  18:37 EDT

## 2023-05-03 NOTE — PLAN OF CARE
Goal Outcome Evaluation:      SB 40-50's. Hypertensive but has improved. A/o x4. Legally blind. Q 2 turns. F/c in place. Ativan before sleep. Scheduled norco for chronic pain.      Progress: improving

## 2023-05-03 NOTE — PROGRESS NOTES
LOS: 2 days   Patient Care Team:  Mariah Hickman MD as PCP - General (Family Medicine)  Beth Butcher MD as Consulting Physician (Cardiology)  Rolando Wick MD as Consulting Physician (Nephrology)  Pablo Sherman Jr., MD as Consulting Physician (Hematology and Oncology)  Mango Arnold MD as Referring Physician (Internal Medicine)    Chief Complaint:     Following for history of HFpEF    Interval History:     Clinically improving.  Denies chest pains.  Slightly more tired this morning, no fevers or chills    Objective   Vital Signs  Temp:  [97.6 °F (36.4 °C)-98.4 °F (36.9 °C)] 97.8 °F (36.6 °C)  Heart Rate:  [46-55] 46  Resp:  [16-20] 16  BP: (146-181)/(53-75) 146/69    Intake/Output Summary (Last 24 hours) at 5/3/2023 0818  Last data filed at 5/3/2023 0005  Gross per 24 hour   Intake 600 ml   Output 2000 ml   Net -1400 ml       Comfortable NAD, resting in bed comfortably  PERRL, conjunctivae clear  Neck supple, no JVD or thyromegaly appreciated.  Carotid pulsations noted  S1/S2 bradycardic, regular, systolic murmur at left upper sternal border  Lungs CTA B, normal effort  Abdomen S/NT/ND (+) BS, no HSM appreciated  Extremities warm, no clubbing, cyanosis, lower extremity edema  All 4 extremities well, diminished effort  No visible or palpable skin lesions  A/Ox4, mood and affect appropriate, slow speech    Results Review:      Results from last 7 days   Lab Units 05/03/23  0608 05/02/23  0550 05/01/23  0525   SODIUM mmol/L 133* 134* 132*   POTASSIUM mmol/L 4.7 4.5 4.2   CHLORIDE mmol/L 101 98 98   CO2 mmol/L 23.7 23.0 23.0   BUN mg/dL 49* 48* 44*   CREATININE mg/dL 1.53* 1.79* 1.57*   GLUCOSE mg/dL 145* 207* 144*   CALCIUM mg/dL 8.5 8.1* 8.3     Results from last 7 days   Lab Units 04/30/23  2131 04/30/23  1806   HSTROP T ng/L 87* 77*     Results from last 7 days   Lab Units 05/02/23  0550 05/01/23  0525 04/30/23  1806   WBC 10*3/mm3 6.62 9.79 13.73*   HEMOGLOBIN g/dL 10.1* 11.6* 12.3    HEMATOCRIT % 32.1* 35.5 36.8   PLATELETS 10*3/mm3 38* 51* 59*                       I reviewed the patient's new clinical results.  I personally viewed and interpreted the patient's EKG/Telemetry data        Medication Review:   amLODIPine, 5 mg, Oral, Q24H  budesonide-formoterol, 2 puff, Inhalation, BID - RT  carvedilol, 3.125 mg, Oral, BID  cholecalciferol, 1,000 Units, Oral, Daily  famotidine, 40 mg, Oral, Nightly  furosemide, 40 mg, Oral, Daily  gabapentin, 600 mg, Oral, Q12H  hydrALAZINE, 50 mg, Oral, BID  HYDROcodone-acetaminophen, 1 tablet, Oral, Q6H  insulin glargine, 20 Units, Subcutaneous, Nightly  insulin lispro, 2-7 Units, Subcutaneous, TID With Meals  levoFLOXacin, 750 mg, Intravenous, Q48H  levothyroxine, 50 mcg, Oral, Q AM  lidocaine, 1 patch, Transdermal, Q24H  magnesium oxide, 200 mg, Oral, Daily  montelukast, 10 mg, Oral, Nightly  polyethylene glycol, 17 g, Oral, Daily  tamsulosin, 0.4 mg, Oral, Nightly             Assessment & Plan       Acute UTI    Legally blind    Pulmonary hypertension    Paroxysmal atrial fibrillation    Stage 4 chronic kidney disease    Type 2 diabetes mellitus with hyperglycemia    Gastroparesis    Chronic heart failure with preserved ejection fraction    Other cirrhosis of liver    Diabetic polyneuropathy associated with type 2 diabetes mellitus    1.  Acute myocardial injury.  Due to sepsis.      2.  Sinus bradycardia  3.  Hypertension  - Stable, asymptomatic with regards to bradycardia  -At this point, would stop her beta-blocker.  Can consider resumption as outpatient once sepsis has resolved.  -She has had worsening hypertension ever since the decrease of carvedilol, will start her back on amlodipine 5 and monitor closely.  She was previously taken off of this due to lower blood pressures and concerns for lower extremity swelling.  -Increase hydralazine to 50 3 times daily  -Continue Lasix 40  - Not a candidate for ACE or ARB right now due to NADER     3.   Hypothyroidism  - On levothyroxine     4. Severe aortic stenosis, status post AVR [place] Trifecta bovine   pericardial valve, 21 mm on 06/08/2013.  Previous gradients on echo performed a month ago were normal.  Nothing on her presentation to suggest worsening for now  -Aspirin 81  -We will repeat echo per below     5. Chronic diastolic heart failure: History of grade 2 diastolic dysfunction  -  She was admitted in December 2022 with acute on chronic HFpEF.  She required IV diuresis and was discharged home on 40 mg Lasix.  Of note she was not able to tolerate Jardiance or Farxiga due to UTI.  Spironolactone was also stopped due to NADER.   -Continue home dose diuretic.  Clinically on exam she does not appear to be newly volume overloaded.  -She previously had issues of leg swelling with amlodipine.  Resuming cautiously per above.     6.  Severe sepsis due to E. coli bacteremia from UTI  - She does have a prosthetic aortic valve, will obtain cultures to document clearance, would be unusual for E. coli to be a pathologic organism, but will need to ensure clearance given her slow recovery.  - We will repeat her echocardiogram to ensure normal valve function and ensure no seeding or other complications from the bacteremia.    Thank you for the consult, making adjustments to above antihypertensive regimen.  Otherwise, her bradycardia is asymptomatic and does not require other further treatment.  We will repeat a culture to make sure that her blood cultures have cleared, and assuming these remain negative, likely stable for discharge tomorrow.  Repeating echocardiogram in interim as well.       Casey Thompson MD  05/03/23  08:18 EDT      Part of this note may be an electronic transcription/translation of spoken language to printed text using the Dragon Dictation System.

## 2023-05-03 NOTE — PLAN OF CARE
Goal Outcome Evaluation:  Plan of Care Reviewed With: patient           Outcome Evaluation: Pt tolerated amb ~20ft w/min/mod 2, pt very unsafe w/her gt technique due to poor posture, leans over walker once fatigued and incr forw flex until her face nearly touches rwx, trying to think of a possible solution to aid in her posture and pt c/o incr fatigue in UEs, may possibly benefit from bilat platform attachments on her own rwx to see if posture and fatigue improve and back pain decr; mentioned idea to CCP and will need order , dtr willing to help if MD oks and pick them up, pt is legally blind so safety is already issue but caregivers assist at home ; plans home at [place], w/24-7 help

## 2023-05-03 NOTE — THERAPY TREATMENT NOTE
Patient Name: Helena Cramen  : 1931    MRN: [id number]                              Today's Date: 5/3/2023       Admit Date: 2023    Visit Dx:     ICD-10-CM ICD-9-CM   1. Acute UTI  N39.0 599.0   2. Left arm pain  M79.602 729.5     Patient Active Problem List   Diagnosis   • Aortic valve stenosis   • Fatigue   • MI (mitral incompetence)   • PVC (premature ventricular contraction)   • Legally blind   • Essential hypertension   • Hypertriglyceridemia   • Pulmonary hypertension   • Paroxysmal atrial fibrillation   • Anxiety   • Stage 4 chronic kidney disease   • Chronic pain disorder   • Degeneration of lumbar intervertebral disc   • Type 2 diabetes mellitus with hyperglycemia   • Esophageal reflux   • Gastroparesis   • Hiatal hernia   • Iatrogenic hypothyroidism   • Macular degeneration   • Malignant neoplasm of uterus   • Osteoarthritis of knee   • Peripheral nerve disease   • Secondary hyperparathyroidism   • Thrombocytopenia   • Chronic heart failure with preserved ejection fraction   • Other cirrhosis of liver   • Hypothyroidism   • Nonrheumatic tricuspid valve regurgitation   • Anemia, chronic disease   • Hyponatremia   • Closed fracture of fifth lumbar vertebra   • Closed fracture of fifth lumbar vertebra, unspecified fracture morphology, initial encounter   • Diabetic polyneuropathy associated with type 2 diabetes mellitus   • Chronic ITP (idiopathic thrombocytopenia)   • Chronic midline low back pain with bilateral sciatica   • Acute deep vein thrombosis of calf, bilateral   • Acute left-sided low back pain with left-sided sciatica   • Urine retention   • Rib fracture   • Acute on chronic combined systolic and diastolic congestive heart failure   • Acute UTI   • Blindness right eye category 3, blindness left eye category 3   • Cognitive communication deficit   • Pseudomonas (aeruginosa) (mallei) (pseudomallei) as the cause of diseases classified elsewhere     Past Medical History:   Diagnosis Date    • Aortic valve stenosis     s/p tissue AVR   • Back pain    • CKD (chronic kidney disease)    • Colitis due to Clostridioides difficile 01/26/2022   • Diastolic dysfunction     Grade 2 per echocardiogram 2013   • Diverticulosis    • Exertional shortness of breath    • Heart disease    • Hiatal hernia    • Hyperlipidemia    • Hypertension    • Hyperthyroidism    • Hypertriglyceridemia 05/31/2018   • Hypothyroidism    • L5 compression fracture (HCC) 08/2022   • Left ventricular hypertrophy    • Legally blind    • Liver disease    • Low back pain    • Macular degeneration    • Mitral regurgitation    • Osteoarthritis of hip    • Osteoporosis    • Pancreatitis 01/26/2022   • Paroxysmal atrial fibrillation    • Peripheral neuropathy    • Premature ventricular contractions    • Pulmonary hypertension    • Renal insufficiency syndrome    • Type 2 diabetes mellitus    • Uterine cancer      Past Surgical History:   Procedure Laterality Date   • AORTIC VALVE REPAIR/REPLACEMENT     • CATARACT EXTRACTION      1970, 1999   • CHOLECYSTECTOMY     • ENDOSCOPY  08/15/2014    no gross lesions in stomach/duodenum, erythrematous mucosa in stomach   • EPIDURAL BLOCK     • HYSTERECTOMY  2007   • STERNOTOMY        General Information     Row Name 05/03/23 1640          Physical Therapy Time and Intention    Document Type therapy note (daily note)  -     Mode of Treatment individual therapy;physical therapy  -     Row Name 05/03/23 1640          General Information    Patient Profile Reviewed yes  -     Existing Precautions/Restrictions fall  -     Barriers to Rehab visual deficit   LEGALLY BLIND  -     Row Name 05/03/23 1640          Living Environment    People in Home other (see comments)   caregiver assist  -     Row Name 05/03/23 1640          Cognition    Orientation Status (Cognition) oriented x 4  -     Row Name 05/03/23 1640          Safety Issues, Functional Mobility    Impairments Affecting Function (Mobility)  balance;coordination;endurance/activity tolerance;postural/trunk control  -     Comment, Safety Issues/Impairments (Mobility) pt completely forw flexed nearly touching wx with her face, pt may possibly benefit from bilat platform attachments to her rwx-dtr aware we suggested, left message for CCP  -           User Key  (r) = Recorded By, (t) = Taken By, (c) = Cosigned By    Initials Name Provider Type    Dasia Harley PTA Physical Therapist Assistant               Mobility     Row Name 05/03/23 1643          Bed Mobility    Comment, (Bed Mobility) in chair  -     Row Name 05/03/23 1643          Sit-Stand Transfer    Sit-Stand [place] (Transfers) 2 person assist;minimum assist (75% patient effort);verbal cues;nonverbal cues (demo/gesture);1 person to manage equipment  -     Assistive Device (Sit-Stand Transfers) walker, front-wheeled  -     Row Name 05/03/23 1643          Gait/Stairs (Locomotion)    [place] Level (Gait) 2 person assist;minimum assist (75% patient effort);moderate assist (50% patient effort);verbal cues;nonverbal cues (demo/gesture)  -     Assistive Device (Gait) walker, front-wheeled  -     Distance in Feet (Gait) 20ft, last 5ft, pt required most of the assist as she was fatigued, leaning completely over rwx and face nearly on rwx-educ on safety; c/o back pain  -     Deviations/Abnormal Patterns (Gait) paul decreased;antalgic;base of support, wide;festinating/shuffling;stride length decreased;weight shifting decreased  -     Bilateral Gait Deviations forward flexed posture   extreme deficit for correct posture  -           User Key  (r) = Recorded By, (t) = Taken By, (c) = Cosigned By    Initials Name Provider Type    Dasia Harley PTA Physical Therapist Assistant               Obj/Interventions    No documentation.                Goals/Plan    No documentation.                Clinical Impression     Row Name 05/03/23 1646          Pain    Pretreatment  Pain Rating 5/10  -     Pain Location - back;buttock  -     Pre/Posttreatment Pain Comment suggested waffle cushion, nsg to try and order , none left on the unit currently  -     Row Name 05/03/23 1646          Plan of Care Review    Plan of Care Reviewed With patient  -     Outcome Evaluation Pt tolerated amb ~20ft w/min/mod 2, pt very unsafe w/her gt technique due to poor posture, leans over walker once fatigued and incr forw flex until her face nearly touches rwx, trying to think of a possible solution to aid in her posture and pt c/o incr fatigue in UEs, may possibly benefit from bilat platform attachments on her own rwx to see if posture and fatigue improve and back pain decr; mentioned idea to CCP and will need order , dtr willing to help if MD oks and pick them up, pt is legally blind so safety is already issue but caregivers assist at home ; plans home at [place], w/24-7 help  -     Row Name 05/03/23 1646          Therapy Assessment/Plan (PT)    Rehab Potential (PT) fair, will monitor progress closely  -     Criteria for Skilled Interventions Met (PT) yes  -     Row Name 05/03/23 1646          Positioning and Restraints    Pre-Treatment Position sitting in chair/recliner  -     Post Treatment Position chair  -JM     In Chair reclined;call light within reach;encouraged to call for assist;exit alarm on;notified nsg  nsg trying to locate waffle cushion  -           User Key  (r) = Recorded By, (t) = Taken By, (c) = Cosigned By    Initials Name Provider Type    Dasia Harley PTA Physical Therapist Assistant               Outcome Measures     Row Name 05/03/23 0900          How much help from another person do you currently need...    Turning from your back to your side while in flat bed without using bedrails? 2  -DC     Moving from lying on back to sitting on the side of a flat bed without bedrails? 1  -DC     Moving to and from a bed to a chair (including a wheelchair)? 2  -DC     Standing  up from a chair using your arms (e.g., wheelchair, bedside chair)? 2  -DC     Climbing 3-5 steps with a railing? 1  -DC     To walk in hospital room? 2  -DC     AM-PAC 6 Clicks Score (PT) 10  -DC     Highest level of mobility 4 --> Transferred to chair/commode  -DC           User Key  (r) = Recorded By, (t) = Taken By, (c) = Cosigned By    Initials Name Provider Type    DC Malina Courtney, RN Registered Nurse                             Physical Therapy Education     Title: PT OT SLP Therapies (Done)     Topic: Physical Therapy (Done)     Point: Mobility training (Done)     Learning Progress Summary           Patient Acceptance, E,TB,D, VU,NR by  at 5/3/2023 1655    Acceptance, E,D, DU by  at 5/2/2023 0956   Family Acceptance, E,TB,D, VU,NR by  at 5/3/2023 1655                   Point: Home exercise program (Done)     Learning Progress Summary           Patient Acceptance, E,TB,D, VU,NR by  at 5/3/2023 1655    Acceptance, E,D, DU by  at 5/2/2023 0956   Family Acceptance, E,TB,D, VU,NR by  at 5/3/2023 1655                   Point: Body mechanics (Done)     Learning Progress Summary           Patient Acceptance, E,TB,D, VU,NR by  at 5/3/2023 1655    Acceptance, E,D, DU by  at 5/2/2023 0956   Family Acceptance, E,TB,D, VU,NR by  at 5/3/2023 1655                   Point: Precautions (Done)     Learning Progress Summary           Patient Acceptance, E,TB,D, VU,NR by  at 5/3/2023 1655    Acceptance, E,D, DU by  at 5/2/2023 0956   Family Acceptance, E,TB,D, VU,NR by  at 5/3/2023 1655                               User Key     Initials Effective Dates Name Provider Type Discipline    CARLOS A 06/16/21 -  Lucina Downs PT Physical Therapist PT     03/07/18 -  Dasia Cervantes PTA Physical Therapist Assistant PT              PT Recommendation and Plan     Plan of Care Reviewed With: patient  Outcome Evaluation: Pt tolerated amb ~20ft w/min/mod 2, pt very unsafe w/her gt technique due to poor posture,  leans over walker once fatigued and incr forw flex until her face nearly touches rwx, trying to think of a possible solution to aid in her posture and pt c/o incr fatigue in UEs, may possibly benefit from bilat platform attachments on her own rwx to see if posture and fatigue improve and back pain decr; mentioned idea to CCP and will need order , dtr willing to help if MD oks and pick them up, pt is legally blind so safety is already issue but caregivers assist at home ; plans home at [place], w/24-7 help     Time Calculation:    PT Charges     Row Name 05/03/23 3773             Time Calculation    Start Time 1500  -      Stop Time 1519  -      Time Calculation (min) 19 min  -LUIS ALBERTO      PT Received On 05/03/23  -LUIS ALBERTO      PT - Next Appointment 05/04/23  -LUIS ALBERTO            User Key  (r) = Recorded By, (t) = Taken By, (c) = Cosigned By    Initials Name Provider Type    aDsia Harley PTA Physical Therapist Assistant              Therapy Charges for Today     Code Description Service Date Service Provider Modifiers Qty    31538583136 HC PT THER PROC EA 15 MIN 5/3/2023 Dasia Cervantes, ABEBA GP 1    77530831474 HC PT THER SUPP EA 15 MIN 5/3/2023 Dasia Cervantes, ABEBA GP 1          PT G-Codes  Outcome Measure Options: AM-PAC 6 Clicks Basic Mobility (PT)  AM-PAC 6 Clicks Score (PT): 10  PT Discharge Summary  Anticipated Discharge Disposition (PT): home with 24/7 care, home with home health (pending progress, pt is assist of 2 for safety , but unsure if assist of 1 PTAdm)    Dasia Cervantes PTA  5/3/2023

## 2023-05-04 ENCOUNTER — READMISSION MANAGEMENT (OUTPATIENT)
Dept: CALL CENTER | Facility: HOSPITAL | Age: 88
End: 2023-05-04
Payer: MEDICARE

## 2023-05-04 ENCOUNTER — APPOINTMENT (OUTPATIENT)
Dept: CARDIOLOGY | Facility: HOSPITAL | Age: 88
DRG: 698 | End: 2023-05-04
Payer: MEDICARE

## 2023-05-04 VITALS
HEART RATE: 68 BPM | TEMPERATURE: 98.6 F | WEIGHT: 178.57 LBS | OXYGEN SATURATION: 96 % | SYSTOLIC BLOOD PRESSURE: 181 MMHG | HEIGHT: 57 IN | DIASTOLIC BLOOD PRESSURE: 48 MMHG | RESPIRATION RATE: 20 BRPM | BODY MASS INDEX: 38.53 KG/M2

## 2023-05-04 LAB
ANION GAP SERPL CALCULATED.3IONS-SCNC: 8 MMOL/L (ref 5–15)
AORTIC ARCH: 3.2 CM
AORTIC DIMENSIONLESS INDEX: 0.3 (DI)
ASCENDING AORTA: 3.5 CM
BASOPHILS # BLD AUTO: 0.01 10*3/MM3 (ref 0–0.2)
BASOPHILS NFR BLD AUTO: 0.2 % (ref 0–1.5)
BH CV ECHO MEAS - AI P1/2T: 526 MSEC
BH CV ECHO MEAS - AO MAX PG: 46.1 MMHG
BH CV ECHO MEAS - AO MEAN PG: 21.6 MMHG
BH CV ECHO MEAS - AO V2 MAX: 339.3 CM/SEC
BH CV ECHO MEAS - AO V2 VTI: 77.1 CM
BH CV ECHO MEAS - AVA(I,D): 0.93 CM2
BH CV ECHO MEAS - EDV(CUBED): 104.8 ML
BH CV ECHO MEAS - EDV(MOD-SP2): 106 ML
BH CV ECHO MEAS - EDV(MOD-SP4): 122 ML
BH CV ECHO MEAS - EF(MOD-BP): 68.2 %
BH CV ECHO MEAS - EF(MOD-SP2): 67.9 %
BH CV ECHO MEAS - EF(MOD-SP4): 68.9 %
BH CV ECHO MEAS - ESV(CUBED): 23.2 ML
BH CV ECHO MEAS - ESV(MOD-SP2): 34 ML
BH CV ECHO MEAS - ESV(MOD-SP4): 38 ML
BH CV ECHO MEAS - FS: 39.5 %
BH CV ECHO MEAS - IVS/LVPW: 1.04 CM
BH CV ECHO MEAS - IVSD: 1.3 CM
BH CV ECHO MEAS - LAT PEAK E' VEL: 7.1 CM/SEC
BH CV ECHO MEAS - LV DIASTOLIC VOL/BSA (35-75): 71.3 CM2
BH CV ECHO MEAS - LV MASS(C)D: 233.5 GRAMS
BH CV ECHO MEAS - LV MAX PG: 5.4 MMHG
BH CV ECHO MEAS - LV MEAN PG: 2.29 MMHG
BH CV ECHO MEAS - LV SYSTOLIC VOL/BSA (12-30): 22.2 CM2
BH CV ECHO MEAS - LV V1 MAX: 115.7 CM/SEC
BH CV ECHO MEAS - LV V1 VTI: 26.4 CM
BH CV ECHO MEAS - LVIDD: 4.7 CM
BH CV ECHO MEAS - LVIDS: 2.9 CM
BH CV ECHO MEAS - LVOT AREA: 2.7 CM2
BH CV ECHO MEAS - LVOT DIAM: 1.86 CM
BH CV ECHO MEAS - LVPWD: 1.26 CM
BH CV ECHO MEAS - MED PEAK E' VEL: 4.2 CM/SEC
BH CV ECHO MEAS - MR MAX PG: 52.9 MMHG
BH CV ECHO MEAS - MR MAX VEL: 363.8 CM/SEC
BH CV ECHO MEAS - MV A DUR: 0.13 SEC
BH CV ECHO MEAS - MV A MAX VEL: 53 CM/SEC
BH CV ECHO MEAS - MV DEC SLOPE: 832 CM/SEC2
BH CV ECHO MEAS - MV DEC TIME: 188 MSEC
BH CV ECHO MEAS - MV E MAX VEL: 157 CM/SEC
BH CV ECHO MEAS - MV E/A: 3
BH CV ECHO MEAS - MV MAX PG: 11.7 MMHG
BH CV ECHO MEAS - MV MEAN PG: 2.49 MMHG
BH CV ECHO MEAS - MV P1/2T: 63.1 MSEC
BH CV ECHO MEAS - MV V2 VTI: 51.1 CM
BH CV ECHO MEAS - MVA(P1/2T): 3.5 CM2
BH CV ECHO MEAS - MVA(VTI): 1.4 CM2
BH CV ECHO MEAS - PA ACC TIME: 0.08 SEC
BH CV ECHO MEAS - PA PR(ACCEL): 44.5 MMHG
BH CV ECHO MEAS - PA V2 MAX: 100.4 CM/SEC
BH CV ECHO MEAS - PULM A REVS DUR: 0.12 SEC
BH CV ECHO MEAS - PULM A REVS VEL: 31.1 CM/SEC
BH CV ECHO MEAS - PULM DIAS VEL: 48.7 CM/SEC
BH CV ECHO MEAS - PULM S/D: 0.91
BH CV ECHO MEAS - PULM SYS VEL: 44.2 CM/SEC
BH CV ECHO MEAS - QP/QS: 0.83
BH CV ECHO MEAS - RAP SYSTOLE: 3 MMHG
BH CV ECHO MEAS - RV MAX PG: 2.02 MMHG
BH CV ECHO MEAS - RV V1 MAX: 71.1 CM/SEC
BH CV ECHO MEAS - RV V1 VTI: 18.1 CM
BH CV ECHO MEAS - RVOT DIAM: 2.05 CM
BH CV ECHO MEAS - RVSP: 51 MMHG
BH CV ECHO MEAS - SI(MOD-SP2): 42.1 ML/M2
BH CV ECHO MEAS - SI(MOD-SP4): 49.1 ML/M2
BH CV ECHO MEAS - SV(LVOT): 71.5 ML
BH CV ECHO MEAS - SV(MOD-SP2): 72 ML
BH CV ECHO MEAS - SV(MOD-SP4): 84 ML
BH CV ECHO MEAS - SV(RVOT): 59.6 ML
BH CV ECHO MEAS - TAPSE (>1.6): 2.09 CM
BH CV ECHO MEAS - TR MAX PG: 47.5 MMHG
BH CV ECHO MEAS - TR MAX VEL: 344.8 CM/SEC
BH CV ECHO MEASUREMENTS AVERAGE E/E' RATIO: 27.79
BH CV XLRA - RV BASE: 4.1 CM
BH CV XLRA - RV LENGTH: 7.9 CM
BH CV XLRA - RV MID: 3.1 CM
BH CV XLRA - TDI S': 7.9 CM/SEC
BUN SERPL-MCNC: 49 MG/DL (ref 8–23)
BUN/CREAT SERPL: 26.9 (ref 7–25)
CALCIUM SPEC-SCNC: 8 MG/DL (ref 8.2–9.6)
CHLORIDE SERPL-SCNC: 100 MMOL/L (ref 98–107)
CO2 SERPL-SCNC: 25 MMOL/L (ref 22–29)
CREAT SERPL-MCNC: 1.82 MG/DL (ref 0.57–1)
DEPRECATED RDW RBC AUTO: 50.5 FL (ref 37–54)
EGFRCR SERPLBLD CKD-EPI 2021: 25.8 ML/MIN/1.73
EOSINOPHIL # BLD AUTO: 0.08 10*3/MM3 (ref 0–0.4)
EOSINOPHIL NFR BLD AUTO: 1.4 % (ref 0.3–6.2)
ERYTHROCYTE [DISTWIDTH] IN BLOOD BY AUTOMATED COUNT: 15.9 % (ref 12.3–15.4)
GLUCOSE BLDC GLUCOMTR-MCNC: 144 MG/DL (ref 70–130)
GLUCOSE BLDC GLUCOMTR-MCNC: 230 MG/DL (ref 70–130)
GLUCOSE SERPL-MCNC: 143 MG/DL (ref 65–99)
HCT VFR BLD AUTO: 32.6 % (ref 34–46.6)
HGB BLD-MCNC: 10.5 G/DL (ref 12–15.9)
IMM GRANULOCYTES # BLD AUTO: 0.02 10*3/MM3 (ref 0–0.05)
IMM GRANULOCYTES NFR BLD AUTO: 0.3 % (ref 0–0.5)
LEFT ATRIUM VOLUME INDEX: 51.2 ML/M2
LYMPHOCYTES # BLD AUTO: 1.51 10*3/MM3 (ref 0.7–3.1)
LYMPHOCYTES NFR BLD AUTO: 26.4 % (ref 19.6–45.3)
MAXIMAL PREDICTED HEART RATE: 128 BPM
MCH RBC QN AUTO: 28.3 PG (ref 26.6–33)
MCHC RBC AUTO-ENTMCNC: 32.2 G/DL (ref 31.5–35.7)
MCV RBC AUTO: 87.9 FL (ref 79–97)
MONOCYTES # BLD AUTO: 0.58 10*3/MM3 (ref 0.1–0.9)
MONOCYTES NFR BLD AUTO: 10.1 % (ref 5–12)
NEUTROPHILS NFR BLD AUTO: 3.52 10*3/MM3 (ref 1.7–7)
NEUTROPHILS NFR BLD AUTO: 61.6 % (ref 42.7–76)
NRBC BLD AUTO-RTO: 0 /100 WBC (ref 0–0.2)
PLATELET # BLD AUTO: 59 10*3/MM3 (ref 140–450)
PMV BLD AUTO: 11.5 FL (ref 6–12)
POTASSIUM SERPL-SCNC: 4.7 MMOL/L (ref 3.5–5.2)
RBC # BLD AUTO: 3.71 10*6/MM3 (ref 3.77–5.28)
SINUS: 2.7 CM
SODIUM SERPL-SCNC: 133 MMOL/L (ref 136–145)
STJ: 2.5 CM
STRESS TARGET HR: 109 BPM
WBC NRBC COR # BLD: 5.72 10*3/MM3 (ref 3.4–10.8)

## 2023-05-04 PROCEDURE — 82948 REAGENT STRIP/BLOOD GLUCOSE: CPT

## 2023-05-04 PROCEDURE — 85025 COMPLETE CBC W/AUTO DIFF WBC: CPT | Performed by: HOSPITALIST

## 2023-05-04 PROCEDURE — 80048 BASIC METABOLIC PNL TOTAL CA: CPT | Performed by: HOSPITALIST

## 2023-05-04 PROCEDURE — 63710000001 INSULIN LISPRO (HUMAN) PER 5 UNITS: Performed by: HOSPITALIST

## 2023-05-04 PROCEDURE — 94761 N-INVAS EAR/PLS OXIMETRY MLT: CPT

## 2023-05-04 PROCEDURE — 97530 THERAPEUTIC ACTIVITIES: CPT

## 2023-05-04 PROCEDURE — 25010000002 LEVOFLOXACIN PER 250 MG: Performed by: NURSE PRACTITIONER

## 2023-05-04 PROCEDURE — 94664 DEMO&/EVAL PT USE INHALER: CPT

## 2023-05-04 PROCEDURE — 99232 SBSQ HOSP IP/OBS MODERATE 35: CPT | Performed by: NURSE PRACTITIONER

## 2023-05-04 PROCEDURE — 94799 UNLISTED PULMONARY SVC/PX: CPT

## 2023-05-04 PROCEDURE — 93306 TTE W/DOPPLER COMPLETE: CPT | Performed by: INTERNAL MEDICINE

## 2023-05-04 PROCEDURE — 25510000001 PERFLUTREN (DEFINITY) 8.476 MG IN SODIUM CHLORIDE (PF) 0.9 % 10 ML INJECTION: Performed by: STUDENT IN AN ORGANIZED HEALTH CARE EDUCATION/TRAINING PROGRAM

## 2023-05-04 PROCEDURE — 94760 N-INVAS EAR/PLS OXIMETRY 1: CPT

## 2023-05-04 PROCEDURE — 93306 TTE W/DOPPLER COMPLETE: CPT

## 2023-05-04 RX ORDER — CARVEDILOL 3.12 MG/1
3.12 TABLET ORAL 2 TIMES DAILY
Qty: 60 TABLET | Refills: 0 | Status: SHIPPED | OUTPATIENT
Start: 2023-05-04 | End: 2023-06-03

## 2023-05-04 RX ORDER — LEVOFLOXACIN 750 MG/1
750 TABLET ORAL EVERY OTHER DAY
Qty: 3 TABLET | Refills: 0 | Status: SHIPPED | OUTPATIENT
Start: 2023-05-04 | End: 2023-05-10

## 2023-05-04 RX ORDER — HYDRALAZINE HYDROCHLORIDE 50 MG/1
50 TABLET, FILM COATED ORAL EVERY 8 HOURS SCHEDULED
Qty: 90 TABLET | Refills: 0 | Status: SHIPPED | OUTPATIENT
Start: 2023-05-04 | End: 2023-05-04 | Stop reason: SDUPTHER

## 2023-05-04 RX ORDER — HYDRALAZINE HYDROCHLORIDE 50 MG/1
75 TABLET, FILM COATED ORAL EVERY 8 HOURS SCHEDULED
Qty: 135 TABLET | Refills: 0 | Status: SHIPPED | OUTPATIENT
Start: 2023-05-04 | End: 2023-06-03

## 2023-05-04 RX ORDER — AMLODIPINE BESYLATE 5 MG/1
5 TABLET ORAL
Qty: 30 TABLET | Refills: 0 | Status: SHIPPED | OUTPATIENT
Start: 2023-05-05 | End: 2023-06-04

## 2023-05-04 RX ORDER — ASPIRIN 81 MG/1
81 TABLET ORAL DAILY
Start: 2023-05-05

## 2023-05-04 RX ADMIN — ASPIRIN 81 MG: 81 TABLET, COATED ORAL at 08:44

## 2023-05-04 RX ADMIN — FUROSEMIDE 40 MG: 40 TABLET ORAL at 08:44

## 2023-05-04 RX ADMIN — CARVEDILOL 3.12 MG: 3.12 TABLET, FILM COATED ORAL at 08:43

## 2023-05-04 RX ADMIN — HYDRALAZINE HYDROCHLORIDE 50 MG: 50 TABLET, FILM COATED ORAL at 15:01

## 2023-05-04 RX ADMIN — LEVOFLOXACIN 750 MG: 5 INJECTION, SOLUTION INTRAVENOUS at 08:44

## 2023-05-04 RX ADMIN — Medication 1000 UNITS: at 08:44

## 2023-05-04 RX ADMIN — AMLODIPINE BESYLATE 5 MG: 5 TABLET ORAL at 08:44

## 2023-05-04 RX ADMIN — HYDROCODONE BITARTRATE AND ACETAMINOPHEN 1 TABLET: 7.5; 325 TABLET ORAL at 01:19

## 2023-05-04 RX ADMIN — HYDROCODONE BITARTRATE AND ACETAMINOPHEN 1 TABLET: 7.5; 325 TABLET ORAL at 12:29

## 2023-05-04 RX ADMIN — HYDROCODONE BITARTRATE AND ACETAMINOPHEN 1 TABLET: 7.5; 325 TABLET ORAL at 06:33

## 2023-05-04 RX ADMIN — LEVOTHYROXINE SODIUM 50 MCG: 0.05 TABLET ORAL at 05:41

## 2023-05-04 RX ADMIN — GABAPENTIN 600 MG: 300 CAPSULE ORAL at 08:43

## 2023-05-04 RX ADMIN — BUDESONIDE AND FORMOTEROL FUMARATE DIHYDRATE 2 PUFF: 160; 4.5 AEROSOL RESPIRATORY (INHALATION) at 08:17

## 2023-05-04 RX ADMIN — POLYETHYLENE GLYCOL 3350 17 G: 17 POWDER, FOR SOLUTION ORAL at 08:43

## 2023-05-04 RX ADMIN — HYDRALAZINE HYDROCHLORIDE 50 MG: 50 TABLET, FILM COATED ORAL at 05:41

## 2023-05-04 RX ADMIN — LIDOCAINE 1 PATCH: 140 PATCH CUTANEOUS at 02:17

## 2023-05-04 RX ADMIN — MAGNESIUM OXIDE 400 MG (241.3 MG MAGNESIUM) TABLET 200 MG: TABLET at 08:43

## 2023-05-04 RX ADMIN — INSULIN LISPRO 3 UNITS: 100 INJECTION, SOLUTION INTRAVENOUS; SUBCUTANEOUS at 12:29

## 2023-05-04 RX ADMIN — PERFLUTREN 2 ML: 6.52 INJECTION, SUSPENSION INTRAVENOUS at 11:24

## 2023-05-04 NOTE — PROGRESS NOTES
"    Patient Name: Helena Carmen  :1931  89 y.o.      Patient Care Team:  Mariah Hickman MD as PCP - General (Family Medicine)  Beth Butcher MD as Consulting Physician (Cardiology)  Rolando Wick MD as Consulting Physician (Nephrology)  Pablo Sherman Jr., MD as Consulting Physician (Hematology and Oncology)  Mango Arnold MD as Referring Physician (Internal Medicine)    Chief Complaint: follow up bradycardia, AVR, sepsis    Interval History: resting in bed.  while I was in the room. No real complaint today. Hoping to go home soon.        Objective   Vital Signs  Temp:  [98 °F (36.7 °C)-99.7 °F (37.6 °C)] 98.6 °F (37 °C)  Heart Rate:  [51-68] 68  Resp:  [16-20] 20  BP: (149-181)/(45-59) 181/48    Intake/Output Summary (Last 24 hours) at 2023 1408  Last data filed at 2023 0844  Gross per 24 hour   Intake 980 ml   Output 2030 ml   Net -1050 ml     Flowsheet Rows    Flowsheet Row First Filed Value   Admission Height 144.8 cm (57\") Documented at 2023   Admission Weight 81 kg (178 lb 9.2 oz) Documented at 2023          Physical Exam:   General Appearance:    Alert, cooperative, in no acute distress   Lungs:     Clear to auscultation.  Normal respiratory effort and rate.      Heart:    Regular rhythm and normal rate, normal S1 and S2, no murmurs, gallops or rubs.     Chest Wall:    No abnormalities observed   Abdomen:     Soft, nontender, positive bowel sounds.     Extremities:   no cyanosis, clubbing or edema.  No marked joint deformities.  Adequate musculoskeletal strength.       Results Review:    Results from last 7 days   Lab Units 23  0554   SODIUM mmol/L 133*   POTASSIUM mmol/L 4.7   CHLORIDE mmol/L 100   CO2 mmol/L 25.0   BUN mg/dL 49*   CREATININE mg/dL 1.82*   GLUCOSE mg/dL 143*   CALCIUM mg/dL 8.0*     Results from last 7 days   Lab Units 23  2131 23  1806   HSTROP T ng/L 87* 77*     Results from last 7 days   Lab Units " 05/04/23  0554   WBC 10*3/mm3 5.72   HEMOGLOBIN g/dL 10.5*   HEMATOCRIT % 32.6*   PLATELETS 10*3/mm3 59*                           Medication Review:   amLODIPine, 5 mg, Oral, Q24H  aspirin, 81 mg, Oral, Daily  budesonide-formoterol, 2 puff, Inhalation, BID - RT  carvedilol, 3.125 mg, Oral, BID  cholecalciferol, 1,000 Units, Oral, Daily  famotidine, 40 mg, Oral, Nightly  furosemide, 40 mg, Oral, Daily  gabapentin, 600 mg, Oral, Q12H  hydrALAZINE, 50 mg, Oral, Q8H  HYDROcodone-acetaminophen, 1 tablet, Oral, Q6H  insulin glargine, 20 Units, Subcutaneous, Nightly  insulin lispro, 2-7 Units, Subcutaneous, TID With Meals  levoFLOXacin, 750 mg, Intravenous, Q48H  levothyroxine, 50 mcg, Oral, Q AM  lidocaine, 1 patch, Transdermal, Q24H  magnesium oxide, 200 mg, Oral, Daily  montelukast, 10 mg, Oral, Nightly  polyethylene glycol, 17 g, Oral, Daily  tamsulosin, 0.4 mg, Oral, Nightly              Assessment & Plan      1.  Acute myocardial injury.  Due to sepsis.      2.  Sinus bradycardia - bradycardic at baseline. HR at home 50-55 on higher dose of carvedilol.     3.  Hypertension  - she was started back on amlodipine.  She was previously taken off of this due to lower blood pressures and concerns for lower extremity swelling. The grand daughter is very worried about her being back on this.   -- hydralazine also increased.   - Not a candidate for ACE or ARB right now due to NADER     3.  Hypothyroidism  - On levothyroxine     4. Severe aortic stenosis, status post AVR [place] Trifecta bovine   pericardial valve, 21 mm on 06/08/2013.  Previous gradients on echo performed a month ago were normal.  Nothing on her presentation to suggest worsening for now  -Aspirin 81  -- repeat echo is pending .     5. Chronic diastolic heart failure: History of grade 2 diastolic dysfunction  -  She was admitted in December 2022 with acute on chronic HFpEF.  She required IV diuresis and was discharged home on 40 mg Lasix.  Of note she was not  able to tolerate Jardiance or Farxiga due to UTI.  Spironolactone was also stopped due to NADER.   -Continue home dose diuretic.  Clinically on exam she does not appear to be newly volume overloaded.       6.  Severe sepsis due to E. coli bacteremia from UTI  - She does have a prosthetic aortic valve, will obtain cultures to document clearance, would be unusual for E. coli to be a pathologic organism, but will need to ensure clearance given her slow recovery.  - We will repeat her echocardiogram to ensure normal valve function and ensure no seeding or other complications from the bacteremia.    Increase hydralazine.     Granddaughter very worried about being back on amlodipine. Hopefully we can get her off this quickly with the further titration of hydralazine and possible resumption of beta blocker as I'm not sure her HR is far off baseline.     RIGO Poole  [facility]  05/04/23  14:08 EDT

## 2023-05-04 NOTE — DISCHARGE SUMMARY
.                                                                     PHYSICIAN DISCHARGE SUMMARY                                                                        [facility]    Patient Identification:  Name: Helena Carmen  Age: 89 y.o.  Sex: female  :  1931  MRN: [id number]  Primary Care Physician: Mariah Hickman MD    Admit date: 2023  Discharge date and time:2023  Discharged Condition: good    Discharge Diagnoses:  Active Hospital Problems    Diagnosis  POA    **Acute UTI [N39.0]  Yes    Diabetic polyneuropathy associated with type 2 diabetes mellitus [E11.42]  Yes    Other cirrhosis of liver [K74.69]  Yes    Chronic heart failure with preserved ejection fraction [I50.32]  Yes    Type 2 diabetes mellitus with hyperglycemia [E11.65]  Yes    Gastroparesis [K31.84]  Yes    Pulmonary hypertension [I27.20]  Yes    Paroxysmal atrial fibrillation [I48.0]  Yes    Legally blind [H54.8]  Yes    Stage 4 chronic kidney disease [N18.4]  Yes      Resolved Hospital Problems   No resolved problems to display.   UTI due to chronic Hoffman catheter which is in place for urinary retention  Sepsis present on admission    PMHX:   Past Medical History:   Diagnosis Date    Aortic valve stenosis     s/p tissue AVR    Back pain     CKD (chronic kidney disease)     Colitis due to Clostridioides difficile 2022    Diastolic dysfunction     Grade 2 per echocardiogram     Diverticulosis     Exertional shortness of breath     Heart disease     Hiatal hernia     Hyperlipidemia     Hypertension     Hyperthyroidism     Hypertriglyceridemia 2018    Hypothyroidism     L5 compression fracture (HCC) 2022    Left ventricular hypertrophy     Legally blind     Liver disease     Low back pain     Macular degeneration     Mitral regurgitation     Osteoarthritis of hip     Osteoporosis     Pancreatitis 2022    Paroxysmal atrial fibrillation     Peripheral neuropathy     Premature  ventricular contractions     Pulmonary hypertension     Renal insufficiency syndrome     Type 2 diabetes mellitus     Uterine cancer      PSHX:   Past Surgical History:   Procedure Laterality Date    AORTIC VALVE REPAIR/REPLACEMENT      CATARACT EXTRACTION      1970, 1999    CHOLECYSTECTOMY      ENDOSCOPY  08/15/2014    no gross lesions in stomach/duodenum, erythrematous mucosa in stomach    EPIDURAL BLOCK      HYSTERECTOMY  2007    STERNOTOMY         Hospital Course: Helena Carmen  is a 89 female with a complicated past medical history of type 2 diabetes mellitus, CKD, 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 She states this is normal for her when she has a UTI. Her daughter and grand daughter are at bedside and provide most information, They report she had had indwelling urinary bladder catheter after orthopedic surgery, it was placed by urology due to retention, and poor mobility.  They state that that urology did not want the Hoffman catheter removed until she was able to be up and move around and ambulate.  Family reports that she has been improving, being able to get up out of the bed with assistance and walking varying distances since being home.  The patient denies any chest pain, palpitations, shortness of breath, cough, lightheadedness, dizziness, abdominal pain, nausea, vomiting, diarrhea, or constipation.  She does report sacral wound, that is being taken care of at home by family. They also report recent indwelling catheter change Thursday or Friday of the previous week.     Initial evaluation in the  emergency department is remarkable for-elevated troponin 77, proBNP 83, 129, her lactate is also elevated 2.4, procalcitonin elevated 0.33,  She is leukocytosis with left shift WBC 13.73, neutrophils 92.6 she has hyponatremia 129,  Creatinine is elevated at 1.45/BUN 45 that appears to be near her baseline  chronic jimenes cath cultures pending, her UA as expected is negative for nitrates, she has 3+ leukocytes, too numerous to count WBCs, 4+ bacteria with 3-6 squamous PCR is negative for COVID-19, blood cultures are pending EKG shows some ST depression lateral leads borderline ST elevation anterior leads.  With heart rate of 55 she is followed by Dr. Butcher outpatient she just had echocardiogram on April 5 that showed normal LVSF, with EF of 50%, she has severe pulmonary hypertension,   Fever 99.4- Levaquin chest x-ray shows no acute cardio pulmonary process.      Patient will be admitted to the specialist group for further evaluation and treatment of acute urinary tract infection.  The patient Tootie due to being septic with E. coli.  The patient was treated with broad-spectrum antibiotics.  Cardiology also saw the patient and made some adjustments on the medication.  She was doing better after being in the hospital for several days.  Urology saw the patient for urine retention and the patient and family decided just to keep the Jimenes in for now.  She will follow-up with her primary care and also follow-up with her specialist.  She has 24-hour caregivers at home.  She will finish a few more days of oral Levaquin for her E. coli bacteremia and UTI.    Consults:     Consults       Date and Time Order Name Status Description    5/3/2023  3:39 PM Inpatient Urology Consult Completed     5/1/2023 12:19 AM Inpatient Cardiology Consult Completed     4/30/2023  8:56 PM [facility] (on-call MD unless specified) Details      4/4/2023 12:51 PM [facility] (on-call MD unless specified) Completed           Results from last 7 days   Lab Units  05/04/23  0554   WBC 10*3/mm3 5.72   HEMOGLOBIN g/dL 10.5*   HEMATOCRIT % 32.6*   PLATELETS 10*3/mm3 59*     Results from last 7 days   Lab Units 05/04/23  0554   SODIUM mmol/L 133*   POTASSIUM mmol/L 4.7   CHLORIDE mmol/L 100   CO2 mmol/L 25.0   BUN mg/dL 49*   CREATININE mg/dL 1.82*   GLUCOSE mg/dL 143*   CALCIUM mg/dL 8.0*     Significant Diagnostic Studies:   WBC   Date Value Ref Range Status   05/04/2023 5.72 3.40 - 10.80 10*3/mm3 Final     Hemoglobin   Date Value Ref Range Status   05/04/2023 10.5 (L) 12.0 - 15.9 g/dL Final     Hematocrit   Date Value Ref Range Status   05/04/2023 32.6 (L) 34.0 - 46.6 % Final     Platelets   Date Value Ref Range Status   05/04/2023 59 (L) 140 - 450 10*3/mm3 Final     Sodium   Date Value Ref Range Status   05/04/2023 133 (L) 136 - 145 mmol/L Final     Potassium   Date Value Ref Range Status   05/04/2023 4.7 3.5 - 5.2 mmol/L Final     Chloride   Date Value Ref Range Status   05/04/2023 100 98 - 107 mmol/L Final     CO2   Date Value Ref Range Status   05/04/2023 25.0 22.0 - 29.0 mmol/L Final     BUN   Date Value Ref Range Status   05/04/2023 49 (H) 8 - 23 mg/dL Final     Creatinine   Date Value Ref Range Status   05/04/2023 1.82 (H) 0.57 - 1.00 mg/dL Final     Glucose   Date Value Ref Range Status   05/04/2023 143 (H) 65 - 99 mg/dL Final     Calcium   Date Value Ref Range Status   05/04/2023 8.0 (L) 8.2 - 9.6 mg/dL Final     No results found for: AST, ALT, ALKPHOS  No results found for: APTT, INR  No results found for: COLORU, CLARITYU, SPECGRAV, PHUR, PROTEINUR, GLUCOSEU, KETONESU, BLOODU, NITRITE, LEUKOCYTESUR, BILIRUBINUR, UROBILINOGEN, RBCUA, WBCUA, BACTERIA, UACOMMENT  No results found for: TROPONINT, TROPONINI, BNP  No components found for: HGBA1C;2  No components found for: TSH;2  Imaging Results (All)       Procedure Component Value Units Date/Time    CT Abdomen Pelvis Stone Protocol [[id number]] Collected: 05/01/23 1654     Updated: 05/01/23 1849    Narrative:      CT  ABDOMEN AND PELVIS WITHOUT IV CONTRAST     HISTORY: Urinary tract infection with sepsis     TECHNIQUE:  CT includes axial imaging from the lung bases to the  trochanters without intravenous contrast and without use of oral  contrast. Data reconstructed in coronal and sagittal planes. Radiation  dose reduction techniques were utilized, including automated exposure  control and exposure modulation based on body size.     COMPARISON: None     FINDINGS: Small pleural effusions layer dependently and there is  adjacent basilar atelectasis. Heart size is enlarged and there is a  prosthetic mitral valve. Liver, spleen, adrenal glands, pancreas appear  within normal limits. There is no change in subcentimeter left renal  upper pole hyperdense lesion that may represent hemorrhagic or  proteinaceous cyst or potential solid lesion. There is a similar  hyperdense posterior medial right renal lesion that measures 8 mm that  is likely hemorrhagic or proteinaceous cyst. No new renal abnormality  has developed. There is no hydronephrosis or evidence for obstructive  uropathy. Urinary bladder is decompressed about a Hoffman catheter.     There has been previous repair of the anterior abdominopelvic wall.  There is a small periumbilical hernia that contains fat and extends to  the right of midline. Subcutaneous nodule is present along the anterior  right abdominal wall most likely related to subcutaneous injection.     There is advanced multilevel degenerative disc disease throughout the  lumbar spine. There is an L5 compression fracture which exhibits 25%  height loss and this is without change. Diffuse atherosclerotic  calcifications are present involving the abdominal aorta and iliac  vasculature. There is no bowel dilatation or evidence for bowel  obstruction. There is colonic diverticulosis best demonstrated involving  the sigmoid colon without evidence for diverticula.       Impression:      1. No evidence for acute abnormality in  the abdomen or pelvis or  significant change compared to prior exam 03/09/2023.  2. Subcentimeter bilateral renal increased density lesions are most  likely hemorrhagic or proteinaceous cysts though small solid lesions are  a consideration. There is no evidence for interval change. Hoffman  catheter is present decompressed urinary bladder.  3. Small pleural effusions with adjacent basilar atelectasis.  4. Small nodular opacity within the subcutaneous fat along the right  anterior right abdominal wall most likely related to recent subcutaneous  injection.  5. Diastasis of the rectus sheath with evidence for previous repair of  the anterior abdominopelvic wall. Small periumbilical hernia contains  fat.  6. L5 compression fracture with 25% height loss and this is without  change compared to prior exam 03/09/2023.     Radiation dose reduction techniques were utilized, including automated  exposure control and exposure modulation based on body size.     This report was finalized on 5/1/2023 5:56 PM by Dr. Santos Kelly M.D.       XR Chest 1 View [[id number]] Collected: 04/30/23 1838     Updated: 04/30/23 1842    Narrative:      XR CHEST 1 VW-clinical: Lethargy, are pain     COMPARISON examination 04/04/2023     FINDINGS: There are median sternotomy wires in position. The  cardiomediastinal silhouette is stable. No effusion, edema or acute  airspace disease is demonstrated.     CONCLUSION: No active disease of the chest     This report was finalized on 4/30/2023 6:39 PM by Dr. Art Fabian M.D.             Lab Results (last 7 days)       Procedure Component Value Units Date/Time    POC Glucose Once [[id number]]  (Abnormal) Collected: 05/04/23 1207    Specimen: Blood Updated: 05/04/23 1209     Glucose 230 mg/dL      Comment: Meter: [id number] : [id number] Tj JOHNSON       Blood Culture - Blood, Hand, Right [[id number]]  (Normal) Collected: 05/03/23 1110    Specimen: Blood from Hand, Right Updated: 05/04/23  1130     Blood Culture No growth at 24 hours    Blood Culture - Blood, Hand, Left [[id number]]  (Normal) Collected: 05/03/23 1119    Specimen: Blood from Hand, Left Updated: 05/04/23 1130     Blood Culture No growth at 24 hours    CBC & Differential [[id number]]  (Abnormal) Collected: 05/04/23 0554    Specimen: Blood Updated: 05/04/23 0730    Narrative:      The following orders were created for panel order CBC & Differential.  Procedure                               Abnormality         Status                     ---------                               -----------         ------                     CBC Auto Differential[[id number]]        Abnormal            Final result                 Please view results for these tests on the individual orders.    CBC Auto Differential [[id number]]  (Abnormal) Collected: 05/04/23 0554    Specimen: Blood Updated: 05/04/23 0730     WBC 5.72 10*3/mm3      RBC 3.71 10*6/mm3      Hemoglobin 10.5 g/dL      Hematocrit 32.6 %      MCV 87.9 fL      MCH 28.3 pg      MCHC 32.2 g/dL      RDW 15.9 %      RDW-SD 50.5 fl      MPV 11.5 fL      Platelets 59 10*3/mm3      Neutrophil % 61.6 %      Lymphocyte % 26.4 %      Monocyte % 10.1 %      Eosinophil % 1.4 %      Basophil % 0.2 %      Immature Grans % 0.3 %      Neutrophils, Absolute 3.52 10*3/mm3      Lymphocytes, Absolute 1.51 10*3/mm3      Monocytes, Absolute 0.58 10*3/mm3      Eosinophils, Absolute 0.08 10*3/mm3      Basophils, Absolute 0.01 10*3/mm3      Immature Grans, Absolute 0.02 10*3/mm3      nRBC 0.0 /100 WBC     Basic Metabolic Panel [[id number]]  (Abnormal) Collected: 05/04/23 0554    Specimen: Blood Updated: 05/04/23 0656     Glucose 143 mg/dL      BUN 49 mg/dL      Creatinine 1.82 mg/dL      Sodium 133 mmol/L      Potassium 4.7 mmol/L      Chloride 100 mmol/L      CO2 25.0 mmol/L      Calcium 8.0 mg/dL      BUN/Creatinine Ratio 26.9     Anion Gap 8.0 mmol/L      eGFR 25.8 mL/min/1.73     Narrative:      GFR Normal >60  Chronic Kidney  Disease <60  Kidney Failure <15    The GFR formula is only valid for adults with stable renal function between ages 18 and 70.    POC Glucose Once [[id number]]  (Abnormal) Collected: 05/04/23 0624    Specimen: Blood Updated: 05/04/23 0625     Glucose 144 mg/dL      Comment: Meter: [id number] : [id number] Smith zoiduyana NA       POC Glucose Once [[id number]]  (Abnormal) Collected: 05/03/23 2051    Specimen: Blood Updated: 05/03/23 2053     Glucose 229 mg/dL      Comment: Meter: [id number] : [id number] Smith zoiduyana NA       POC Glucose Once [[id number]]  (Abnormal) Collected: 05/03/23 1649    Specimen: Blood Updated: 05/03/23 1651     Glucose 175 mg/dL      Comment: Meter: [id number] : [id number] [employer] Daniela NA       POC Glucose Once [[id number]]  (Abnormal) Collected: 05/03/23 1044    Specimen: Blood Updated: 05/03/23 1045     Glucose 216 mg/dL      Comment: Meter: [id number] : [id number] [employer] Daniela NA       CBC & Differential [[id number]]  (Abnormal) Collected: 05/03/23 0608    Specimen: Blood Updated: 05/03/23 0830    Narrative:      The following orders were created for panel order CBC & Differential.  Procedure                               Abnormality         Status                     ---------                               -----------         ------                     CBC Auto Differential[[id number]]        Abnormal            Final result                 Please view results for these tests on the individual orders.    CBC Auto Differential [[id number]]  (Abnormal) Collected: 05/03/23 0608    Specimen: Blood Updated: 05/03/23 0830     WBC 6.94 10*3/mm3      RBC 3.69 10*6/mm3      Hemoglobin 10.5 g/dL      Hematocrit 32.9 %      MCV 89.2 fL      MCH 28.5 pg      MCHC 31.9 g/dL      RDW 16.0 %      RDW-SD 51.9 fl      MPV 12.1 fL      Platelets 54 10*3/mm3      Neutrophil % 66.2 %      Lymphocyte % 22.0 %      Monocyte % 9.5 %      Eosinophil % 1.6 %      Basophil % 0.3 %      Immature  Grans % 0.4 %      Neutrophils, Absolute 4.59 10*3/mm3      Lymphocytes, Absolute 1.53 10*3/mm3      Monocytes, Absolute 0.66 10*3/mm3      Eosinophils, Absolute 0.11 10*3/mm3      Basophils, Absolute 0.02 10*3/mm3      Immature Grans, Absolute 0.03 10*3/mm3      nRBC 0.0 /100 WBC     Basic Metabolic Panel [[id number]]  (Abnormal) Collected: 05/03/23 0608    Specimen: Blood Updated: 05/03/23 0705     Glucose 145 mg/dL      BUN 49 mg/dL      Creatinine 1.53 mg/dL      Sodium 133 mmol/L      Potassium 4.7 mmol/L      Chloride 101 mmol/L      CO2 23.7 mmol/L      Calcium 8.5 mg/dL      BUN/Creatinine Ratio 32.0     Anion Gap 8.3 mmol/L      eGFR 31.8 mL/min/1.73     Narrative:      GFR Normal >60  Chronic Kidney Disease <60  Kidney Failure <15    The GFR formula is only valid for adults with stable renal function between ages 18 and 70.    Blood Culture - Blood, Arm, Left [[id number]]  (Abnormal) Collected: 04/30/23 1858    Specimen: Blood from Arm, Left Updated: 05/03/23 0636     Blood Culture Escherichia coli     Isolated from Aerobic Bottle     Gram Stain Aerobic Bottle Gram negative bacilli    Narrative:      Refer to previous blood culture collected on 04/30/2023 at 1806 for MICs.      Blood Culture - Blood, Arm, Left [[id number]]  (Abnormal)  (Susceptibility) Collected: 04/30/23 1806    Specimen: Blood from Arm, Left Updated: 05/03/23 0629     Blood Culture Escherichia coli     Isolated from Anaerobic Bottle     Gram Stain Anaerobic Bottle Gram negative bacilli    Susceptibility        Escherichia coli      EFFIE      Ampicillin Resistant     Ampicillin + Sulbactam Intermediate      Cefepime Susceptible      Ceftazidime Susceptible      Ceftriaxone Susceptible      Gentamicin Susceptible      Levofloxacin Susceptible      Piperacillin + Tazobactam Susceptible      Trimethoprim + Sulfamethoxazole Resistant                      Susceptibility Comments       Escherichia coli    Cefazolin sensitivity will not be reported  for Enterobacteriaceae in non-urine isolates. If cefazolin is preferred, please call the microbiology lab to request an E-test.  With the exception of urinary-sourced infections, aminoglycosides should not be used as monotherapy.               POC Glucose Once [[id number]]  (Abnormal) Collected: 05/03/23 0610    Specimen: Blood Updated: 05/03/23 0611     Glucose 150 mg/dL      Comment: Meter: [id number] : [id number] Asmita JOHNSON       POC Glucose Once [[id number]]  (Abnormal) Collected: 05/02/23 2043    Specimen: Blood Updated: 05/02/23 2044     Glucose 242 mg/dL      Comment: Meter: [id number] : [id number] Arely Ramos RN       POC Glucose Once [[id number]]  (Abnormal) Collected: 05/02/23 1610    Specimen: Blood Updated: 05/02/23 1611     Glucose 230 mg/dL      Comment: Meter: [id number] : [id number] Kaur Erwin NA       POC Glucose Once [[id number]]  (Abnormal) Collected: 05/02/23 1057    Specimen: Blood Updated: 05/02/23 1058     Glucose 279 mg/dL      Comment: Meter: [id number] : [id number] Kaur Erwin NA       Basic Metabolic Panel [[id number]]  (Abnormal) Collected: 05/02/23 0550    Specimen: Blood Updated: 05/02/23 0636     Glucose 207 mg/dL      BUN 48 mg/dL      Creatinine 1.79 mg/dL      Sodium 134 mmol/L      Potassium 4.5 mmol/L      Chloride 98 mmol/L      CO2 23.0 mmol/L      Calcium 8.1 mg/dL      BUN/Creatinine Ratio 26.8     Anion Gap 13.0 mmol/L      eGFR 26.3 mL/min/1.73     Narrative:      GFR Normal >60  Chronic Kidney Disease <60  Kidney Failure <15    The GFR formula is only valid for adults with stable renal function between ages 18 and 70.    Urine Culture - Urine, Urine, Catheter [[id number]]  (Abnormal)  (Susceptibility) Collected: 04/30/23 1807    Specimen: Urine, Catheter Updated: 05/02/23 0632     Urine Culture >100,000 CFU/mL Klebsiella pneumoniae ssp pneumoniae      >100,000 CFU/mL Escherichia coli    Narrative:      Colonization of the urinary tract without  infection is common. Treatment is discouraged unless the patient is symptomatic, pregnant, or undergoing an invasive urologic procedure.    Susceptibility        Klebsiella pneumoniae ssp pneumoniae      EFFIE      Ampicillin Resistant     Ampicillin + Sulbactam Susceptible      Cefazolin Susceptible      Cefepime Susceptible      Ceftazidime Susceptible      Ceftriaxone Susceptible      Gentamicin Susceptible      Levofloxacin Susceptible      Nitrofurantoin Intermediate      Piperacillin + Tazobactam Susceptible      Trimethoprim + Sulfamethoxazole Susceptible                       Susceptibility        Escherichia coli      EFFIE      Ampicillin Resistant     Ampicillin + Sulbactam Intermediate      Cefazolin Susceptible      Cefepime Susceptible      Ceftazidime Susceptible      Ceftriaxone Susceptible      Gentamicin Susceptible      Levofloxacin Susceptible      Nitrofurantoin Susceptible      Piperacillin + Tazobactam Susceptible      Trimethoprim + Sulfamethoxazole Resistant                          Hemoglobin A1c [[id number]]  (Normal) Collected: 05/02/23 0550    Specimen: Blood Updated: 05/02/23 0627     Hemoglobin A1C 5.60 %     Narrative:      Hemoglobin A1C Ranges:    Increased Risk for Diabetes  5.7% to 6.4%  Diabetes                     >= 6.5%  Diabetic Goal                < 7.0%    CBC & Differential [[id number]]  (Abnormal) Collected: 05/02/23 0550    Specimen: Blood Updated: 05/02/23 0625    Narrative:      The following orders were created for panel order CBC & Differential.  Procedure                               Abnormality         Status                     ---------                               -----------         ------                     CBC Auto Differential[[id number]]        Abnormal            Final result                 Please view results for these tests on the individual orders.    CBC Auto Differential [[id number]]  (Abnormal) Collected: 05/02/23 0550    Specimen: Blood Updated:  05/02/23 0625     WBC 6.62 10*3/mm3      RBC 3.54 10*6/mm3      Hemoglobin 10.1 g/dL      Hematocrit 32.1 %      MCV 90.7 fL      MCH 28.5 pg      MCHC 31.5 g/dL      RDW 16.0 %      RDW-SD 53.4 fl      MPV 12.2 fL      Platelets 38 10*3/mm3      Neutrophil % 57.8 %      Lymphocyte % 25.8 %      Monocyte % 14.4 %      Eosinophil % 1.2 %      Basophil % 0.2 %      Immature Grans % 0.6 %      Neutrophils, Absolute 3.83 10*3/mm3      Lymphocytes, Absolute 1.71 10*3/mm3      Monocytes, Absolute 0.95 10*3/mm3      Eosinophils, Absolute 0.08 10*3/mm3      Basophils, Absolute 0.01 10*3/mm3      Immature Grans, Absolute 0.04 10*3/mm3      nRBC 0.2 /100 WBC     POC Glucose Once [[id number]]  (Abnormal) Collected: 05/02/23 0600    Specimen: Blood Updated: 05/02/23 0601     Glucose 208 mg/dL      Comment: Meter: [id number] : [id number] Carwow NA       POC Glucose Once [[id number]]  (Abnormal) Collected: 05/01/23 2114    Specimen: Blood Updated: 05/01/23 2115     Glucose 251 mg/dL      Comment: Meter: [id number] : [id number] Carwow NA       POC Glucose Once [[id number]]  (Abnormal) Collected: 05/01/23 1617    Specimen: Blood Updated: 05/01/23 1619     Glucose 239 mg/dL      Comment: Meter: [id number] : [id number] Galdino Na NA       POC Glucose Once [[id number]]  (Abnormal) Collected: 05/01/23 1121    Specimen: Blood Updated: 05/01/23 1126     Glucose 272 mg/dL      Comment: Meter: [id number] : [id number] Raza Carty CNA       STAT Lactic Acid, Reflex [[id number]]  (Normal) Collected: 05/01/23 1001    Specimen: Blood Updated: 05/01/23 1037     Lactate 1.8 mmol/L     Blood Culture ID, PCR - Blood, Arm, Left [[id number]]  (Abnormal) Collected: 04/30/23 1806    Specimen: Blood from Arm, Left Updated: 05/01/23 0942     BCID, PCR Escherichia coli. Identification by BCID2 PCR.     BOTTLE TYPE Anaerobic Bottle    Narrative:      No resistance genes detected.    POC Glucose Once [[id number]]  (Abnormal)  Collected: 05/01/23 0628    Specimen: Blood Updated: 05/01/23 0629     Glucose 166 mg/dL      Comment: Meter: [id number] : [id number] Camilo JOHNSON       Comprehensive Metabolic Panel [[id number]]  (Abnormal) Collected: 05/01/23 0525    Specimen: Blood Updated: 05/01/23 0623     Glucose 144 mg/dL      BUN 44 mg/dL      Creatinine 1.57 mg/dL      Sodium 132 mmol/L      Potassium 4.2 mmol/L      Chloride 98 mmol/L      CO2 23.0 mmol/L      Calcium 8.3 mg/dL      Total Protein 5.7 g/dL      Albumin 3.1 g/dL      ALT (SGPT) 50 U/L      AST (SGOT) 52 U/L      Alkaline Phosphatase 72 U/L      Total Bilirubin 0.5 mg/dL      Globulin 2.6 gm/dL      A/G Ratio 1.2 g/dL      BUN/Creatinine Ratio 28.0     Anion Gap 11.0 mmol/L      eGFR 30.8 mL/min/1.73     Narrative:      GFR Normal >60  Chronic Kidney Disease <60  Kidney Failure <15    The GFR formula is only valid for adults with stable renal function between ages 18 and 70.    CBC & Differential [[id number]]  (Abnormal) Collected: 05/01/23 0525    Specimen: Blood Updated: 05/01/23 0614    Narrative:      The following orders were created for panel order CBC & Differential.  Procedure                               Abnormality         Status                     ---------                               -----------         ------                     CBC Auto Differential[[id number]]        Abnormal            Final result                 Please view results for these tests on the individual orders.    CBC Auto Differential [[id number]]  (Abnormal) Collected: 05/01/23 0525    Specimen: Blood Updated: 05/01/23 0614     WBC 9.79 10*3/mm3      RBC 3.99 10*6/mm3      Hemoglobin 11.6 g/dL      Hematocrit 35.5 %      MCV 89.0 fL      MCH 29.1 pg      MCHC 32.7 g/dL      RDW 16.2 %      RDW-SD 52.4 fl      MPV 11.3 fL      Platelets 51 10*3/mm3      Neutrophil % 87.5 %      Lymphocyte % 4.4 %      Monocyte % 7.6 %      Eosinophil % 0.1 %      Basophil % 0.1 %      Immature Grans %  0.3 %      Neutrophils, Absolute 8.57 10*3/mm3      Lymphocytes, Absolute 0.43 10*3/mm3      Monocytes, Absolute 0.74 10*3/mm3      Eosinophils, Absolute 0.01 10*3/mm3      Basophils, Absolute 0.01 10*3/mm3      Immature Grans, Absolute 0.03 10*3/mm3      nRBC 0.1 /100 WBC     STAT Lactic Acid, Reflex [[id number]]  (Abnormal) Collected: 05/01/23 0320    Specimen: Blood Updated: 05/01/23 0404     Lactate 2.4 mmol/L     STAT Lactic Acid, Reflex [[id number]]  (Abnormal) Collected: 05/01/23 0016    Specimen: Blood Updated: 05/01/23 0111     Lactate 2.3 mmol/L     POC Glucose Once [[id number]]  (Abnormal) Collected: 04/30/23 2240    Specimen: Blood Updated: 04/30/23 2241     Glucose 290 mg/dL      Comment: Meter: [id number] : [id number] Arely Ramos RN       High Sensitivity Troponin T 2Hr [[id number]]  (Abnormal) Collected: 04/30/23 2131    Specimen: Blood Updated: 04/30/23 2214     HS Troponin T 87 ng/L      Troponin T Delta 10 ng/L     Narrative:      High Sensitive Troponin T Reference Range:  <10.0 ng/L- Negative Female for AMI  <15.0 ng/L- Negative Male for AMI  >=10 - Abnormal Female indicating possible myocardial injury.  >=15 - Abnormal Male indicating possible myocardial injury.   Clinicians would have to utilize clinical acumen, EKG, Troponin, and serial changes to determine if it is an Acute Myocardial Infarction or myocardial injury due to an underlying chronic condition.         STAT Lactic Acid, Reflex [[id number]]  (Abnormal) Collected: 04/30/23 2131    Specimen: Blood Updated: 04/30/23 2213     Lactate 2.9 mmol/L     Respiratory Panel PCR w/COVID-19(SARS-CoV-2) [facility]/CHARLIE/ALEJANDRO/PAD/COR/MAD/PASQUALE In-House, NP Swab in [facility], 3-4 HR TAT - Swab, Nasopharynx [[id number]]  (Normal) Collected: 04/30/23 1807    Specimen: Swab from Nasopharynx Updated: 04/30/23 1919     ADENOVIRUS, PCR Not Detected     Coronavirus 229E Not Detected     Coronavirus HKU1 Not Detected     Coronavirus NL63 Not Detected     Coronavirus  OC43 Not Detected     COVID19 Not Detected     Human Metapneumovirus Not Detected     Human Rhinovirus/Enterovirus Not Detected     Influenza A PCR Not Detected     Influenza B PCR Not Detected     Parainfluenza Virus 1 Not Detected     Parainfluenza Virus 2 Not Detected     Parainfluenza Virus 3 Not Detected     Parainfluenza Virus 4 Not Detected     RSV, PCR Not Detected     Bordetella pertussis pcr Not Detected     Bordetella parapertussis PCR Not Detected     Chlamydophila pneumoniae PCR Not Detected     Mycoplasma pneumo by PCR Not Detected    Narrative:      In the setting of a positive respiratory panel with a viral infection PLUS a negative procalcitonin without other underlying concern for bacterial infection, consider observing off antibiotics or discontinuation of antibiotics and continue supportive care. If the respiratory panel is positive for atypical bacterial infection (Bordetella pertussis, Chlamydophila pneumoniae, or Mycoplasma pneumoniae), consider antibiotic de-escalation to target atypical bacterial infection.    Lactic Acid, Plasma [[id number]]  (Abnormal) Collected: 04/30/23 1806    Specimen: Blood Updated: 04/30/23 1902     Lactate 2.4 mmol/L     Single High Sensitivity Troponin T [[id number]]  (Abnormal) Collected: 04/30/23 1806    Specimen: Blood Updated: 04/30/23 1901     HS Troponin T 77 ng/L     Narrative:      High Sensitive Troponin T Reference Range:  <10.0 ng/L- Negative Female for AMI  <15.0 ng/L- Negative Male for AMI  >=10 - Abnormal Female indicating possible myocardial injury.  >=15 - Abnormal Male indicating possible myocardial injury.   Clinicians would have to utilize clinical acumen, EKG, Troponin, and serial changes to determine if it is an Acute Myocardial Infarction or myocardial injury due to an underlying chronic condition.         BNP [[id number]]  (Abnormal) Collected: 04/30/23 1806    Specimen: Blood Updated: 04/30/23 1900     proBNP 3,129.0 pg/mL     Narrative:       "Among patients with dyspnea, NT-proBNP is highly sensitive for the detection of acute congestive heart failure. In addition NT-proBNP of <300 pg/ml effectively rules out acute congestive heart failure with 99% negative predictive value.    Results may be falsely decreased if patient taking Biotin.      Procalcitonin [[id number]]  (Abnormal) Collected: 04/30/23 1806    Specimen: Blood Updated: 04/30/23 1900     Procalcitonin 0.33 ng/mL     Narrative:      As a Marker for Sepsis (Non-Neonates):    1. <0.5 ng/mL represents a low risk of severe sepsis and/or septic shock.  2. >2 ng/mL represents a high risk of severe sepsis and/or septic shock.    As a Marker for Lower Respiratory Tract Infections that require antibiotic therapy:    PCT on Admission    Antibiotic Therapy       6-12 Hrs later    >0.5                Strongly Recommended  >0.25 - <0.5        Recommended   0.1 - 0.25          Discouraged              Remeasure/reassess PCT  <0.1                Strongly Discouraged     Remeasure/reassess PCT    As 28 day mortality risk marker: \"Change in Procalcitonin Result\" (>80% or <=80%) if Day 0 (or Day 1) and Day 4 values are available. Refer to http://www.[email or web address]-pct-calculator.[email or web address]    Change in PCT <=80%  A decrease of PCT levels below or equal to 80% defines a positive change in PCT test result representing a higher risk for 28-day all-cause mortality of patients diagnosed with severe sepsis for septic shock.    Change in PCT >80%  A decrease of PCT levels of more than 80% defines a negative change in PCT result representing a lower risk for 28-day all-cause mortality of patients diagnosed with severe sepsis or septic shock.       Comprehensive Metabolic Panel [[id number]]  (Abnormal) Collected: 04/30/23 1806    Specimen: Blood Updated: 04/30/23 1854     Glucose 145 mg/dL      BUN 45 mg/dL      Creatinine 1.45 mg/dL      Sodium 129 mmol/L      Potassium 4.9 mmol/L      Chloride 92 mmol/L      CO2 27.9 mmol/L      " Calcium 9.1 mg/dL      Total Protein 6.4 g/dL      Albumin 3.3 g/dL      ALT (SGPT) 39 U/L      AST (SGOT) 38 U/L      Alkaline Phosphatase 66 U/L      Total Bilirubin 0.5 mg/dL      Globulin 3.1 gm/dL      A/G Ratio 1.1 g/dL      BUN/Creatinine Ratio 31.0     Anion Gap 9.1 mmol/L      eGFR 33.9 mL/min/1.73     Narrative:      GFR Normal >60  Chronic Kidney Disease <60  Kidney Failure <15    The GFR formula is only valid for adults with stable renal function between ages 18 and 70.    CBC & Differential [[id number]]  (Abnormal) Collected: 04/30/23 1806    Specimen: Blood Updated: 04/30/23 1844    Narrative:      The following orders were created for panel order CBC & Differential.  Procedure                               Abnormality         Status                     ---------                               -----------         ------                     CBC Auto Differential[[id number]]        Abnormal            Final result                 Please view results for these tests on the individual orders.    CBC Auto Differential [[id number]]  (Abnormal) Collected: 04/30/23 1806    Specimen: Blood Updated: 04/30/23 1844     WBC 13.73 10*3/mm3      RBC 4.17 10*6/mm3      Hemoglobin 12.3 g/dL      Hematocrit 36.8 %      MCV 88.2 fL      MCH 29.5 pg      MCHC 33.4 g/dL      RDW 15.9 %      RDW-SD 51.3 fl      MPV 12.0 fL      Platelets 59 10*3/mm3      Neutrophil % 92.6 %      Lymphocyte % 2.8 %      Monocyte % 4.0 %      Eosinophil % 0.1 %      Basophil % 0.1 %      Neutrophils, Absolute 12.72 10*3/mm3      Lymphocytes, Absolute 0.38 10*3/mm3      Monocytes, Absolute 0.55 10*3/mm3      Eosinophils, Absolute 0.01 10*3/mm3      Basophils, Absolute 0.02 10*3/mm3     Urinalysis, Microscopic Only - Urine, Catheter [[id number]]  (Abnormal) Collected: 04/30/23 1807    Specimen: Urine, Catheter Updated: 04/30/23 1833     RBC, UA 3-5 /HPF      WBC, UA Too Numerous to Count /HPF      Bacteria, UA 4+ /HPF      Squamous Epithelial Cells,  "UA 3-6 /HPF      Hyaline Casts, UA 0-2 /LPF      Methodology Automated Microscopy    Urinalysis With Culture If Indicated - Urine, Catheter [[id number]]  (Abnormal) Collected: 04/30/23 1807    Specimen: Urine, Catheter Updated: 04/30/23 1831     Color, UA Yellow     Appearance, UA Cloudy     pH, UA 7.0     Specific Gravity, UA 1.009     Glucose, UA Negative     Ketones, UA Negative     Bilirubin, UA Negative     Blood, UA Small (1+)     Protein, UA >=300 mg/dL (3+)     Leuk Esterase, UA Large (3+)     Nitrite, UA Negative     Urobilinogen, UA 0.2 E.U./dL    Narrative:      In absence of clinical symptoms, the presence of pyuria, bacteria, and/or nitrites on the urinalysis result does not correlate with infection.          BP (!) 181/48 (BP Location: Right arm, Patient Position: Lying)   Pulse 68   Temp 98.6 °F (37 °C) (Oral)   Resp 20   Ht 144.8 cm (57\")   Wt 81 kg (178 lb 9.2 oz)   SpO2 96%   BMI 38.64 kg/m²     Discharge Exam:  General Appearance:    Alert, cooperative, no distress                          Head:    Normocephalic, without obvious abnormality, atraumatic                          Eyes:                            Throat:   Lips, tongue, gums normal                          Neck:   Supple, symmetrical, trachea midline, no JVD                        Lungs:     Clear to auscultation bilaterally, respirations unlabored                Chest Wall:    No tenderness or deformity                        Heart:    Regular rate and rhythm, S1 and S2 normal, no murmur,no  Rub  or gallop                  Abdomen:     Soft, non-tender, bowel sounds active, no masses, no organomegaly                  Extremities:   Extremities normal, atraumatic, no cyanosis or edema                             Skin:   Skin is warm and dry,  no rashes or palpable lesions                  Neurologic:   no focal deficits noted     Disposition:  Home with home health    Activity as tolerated    Diet as tolerated  Diet Order "   Procedures    Diet: Diabetic Diets, Cardiac Diets; Healthy Heart (2-3 Na+); Consistent Carbohydrate; Texture: Regular Texture (IDDSI 7); Fluid Consistency: Thin (IDDSI 0)       Patient Instructions:      Discharge Medications        New Medications        Instructions Start Date   amLODIPine 5 MG tablet  Commonly known as: NORVASC   5 mg, Oral, Every 24 Hours Scheduled   Start Date: May 5, 2023     aspirin 81 MG EC tablet   81 mg, Oral, Daily   Start Date: May 5, 2023     levoFLOXacin 750 MG tablet  Commonly known as: Levaquin   750 mg, Oral, Every Other Day             Changes to Medications        Instructions Start Date   carvedilol 3.125 MG tablet  Commonly known as: COREG  What changed:   medication strength  how much to take  additional instructions   3.125 mg, Oral, 2 Times Daily      hydrALAZINE 50 MG tablet  Commonly known as: APRESOLINE  What changed:   medication strength  when to take this   50 mg, Oral, Every 8 Hours Scheduled             Continue These Medications        Instructions Start Date   Advair -21 MCG/ACT inhaler  Generic drug: fluticasone-salmeterol   2 puffs, Inhalation, 2 Times Daily - RT      bismuth subsalicylate 262 MG/15ML suspension  Commonly known as: PEPTO BISMOL   30 mL, Oral, Every 6 Hours PRN      calcium carbonate 500 MG chewable tablet  Commonly known as: TUMS   2 tablets, Oral, 3 Times Daily PRN      cholecalciferol 25 MCG (1000 UT) tablet  Commonly known as: VITAMIN D3   1,000 Units, Oral, Daily      famotidine 40 MG tablet  Commonly known as: PEPCID   40 mg, Oral, Nightly      furosemide 40 MG tablet  Commonly known as: LASIX   40 mg, Oral, Daily      gabapentin 600 MG tablet  Commonly known as: NEURONTIN   600 mg, Oral, 2 Times Daily      HYDROcodone-acetaminophen 7.5-325 MG per tablet  Commonly known as: NORCO   1 tablet, Oral, Every 6 Hours      insulin glargine 100 UNIT/ML injection  Commonly known as: LANTUS, SEMGLEE   20 Units, Subcutaneous, Nightly       insulin lispro 100 UNIT/ML injection  Commonly known as: HUMALOG/ADMELOG   0-9 Units, Subcutaneous, 3 Times Daily Before Meals      levothyroxine 50 MCG tablet  Commonly known as: SYNTHROID, LEVOTHROID   50 mcg, Oral, Every Early Morning      lidocaine 5 %  Commonly known as: LIDODERM   1 patch, Transdermal, Every 24 Hours, Remove & Discard patch within 12 hours or as directed by MD      LORazepam 0.5 MG tablet  Commonly known as: ATIVAN   0.5 mg, Oral, Every 8 Hours PRN      magnesium oxide 400 MG tablet  Commonly known as: MAG-OX   200 mg, Oral, Daily      montelukast 10 MG tablet  Commonly known as: SINGULAIR   1 tablet, Oral, Nightly      ondansetron 8 MG tablet  Commonly known as: ZOFRAN   8 mg, Oral, As Needed      polyethylene glycol 17 GM/SCOOP powder  Commonly known as: MIRALAX   17 g, Oral, Daily      tamsulosin 0.4 MG capsule 24 hr capsule  Commonly known as: FLOMAX   0.4 mg, Oral, Every Night at Bedtime             Stop These Medications      predniSONE 10 MG tablet  Commonly known as: DELTASONE            Future Appointments   Date Time Provider Department Center   5/18/2023  3:15 PM Casey Erickson MD [facility]   5/23/2023  2:30 PM Sima Blum APRN [facility]   5/23/2023  3:20 PM Pablo Sherman Jr., MD [facility]   7/26/2023  2:00 PM Freddy Martel MD [facility]     Additional Instructions for the Follow-ups that You Need to Schedule       Ambulatory Referral to Home Health (Hospital)   As directed      Face to Face Visit Date: 5/4/2023    Follow-up provider for Plan of Care?: I treated the patient in an acute care facility and will not continue treatment after discharge.    Follow-up provider: ERVIN WARD [[id number]]    Reason/Clinical Findings: weak    Describe mobility limitations that make leaving home difficult: weakness    Nursing/Therapeutic Services Requested: Skilled Nursing Physical Therapy    Skilled nursing orders: Other Cardiopulmonary  assessments    PT orders: Therapeutic exercise    Frequency: 1 Week 1                Contact information for follow-up providers       Alfredito Webber MD. Call in 1 week(s).    Specialty: Urology  Why: to make follow up appointment  Contact information:  [address] [place] [address] [place] [address] [phone]               Mariah Hickman MD .    Specialty: Family Medicine  Contact information:  [address] [facility] [address]  [facility] [id number] [phone]                       Contact information for after-discharge care       Home Medical Care       [facility] [place] .    Service: Home Health Services  Contact information:  [address]  [facility] [address]  [phone]                                 Discharge Order (From admission, onward)       Start     Ordered    05/04/23 1424  Discharge patient  Once        Expected Discharge Date: 05/04/23    Discharge Disposition: Home-Health Care [facility]    Physician of Record for Attribution - Please select from Treatment Team: RINKU LENNON [[id number]]    Review needed by CMO to determine Physician of Record: No       Question Answer Comment   Physician of Record for Attribution - Please select from Treatment Team RINKU LENNON    Review needed by CMO to determine Physician of Record No        05/04/23 1431                    Total time spent discharging patient including evaluation,post hospitalization follow up,  medication and post hospitalization instructions and education total time exceeds 30 minutes.    Signed:  Rinku Lennon MD  5/4/2023  14:33 EDT

## 2023-05-04 NOTE — PLAN OF CARE
Goal Outcome Evaluation:         VSS ex/ sinus valentín. RA then 1L to sleep. Q2 turns. Legally blind. [facility] care, [facility]. Lidocaine patch for back. Q6 norco scheduled for back pain. Plans to D/C tmrw to home with family.

## 2023-05-04 NOTE — PLAN OF CARE
Goal Outcome Evaluation:  Plan of Care Reviewed With: patient        Progress: improving  Outcome Evaluation: Patient is agreeable to PT this AM. She improved overall functional mobility. Today she ambulated 40ft using rwx requiring Jm and completed STSx2 to rwx requiring CGA-minAx2. Pt ambulated in excessive forward flexion and family at bedside states this is normal for pt. She will continue to benefit from skilled PT to address functional deficits. Plan is home [facility] 24/7 assist.

## 2023-05-04 NOTE — THERAPY TREATMENT NOTE
Patient Name: Helena Carmen  : 1931    MRN: [id number]                              Today's Date: 2023       Admit Date: 2023    Visit Dx:     ICD-10-CM ICD-9-CM   1. Acute UTI  N39.0 599.0   2. Left arm pain  M79.602 729.5     Patient Active Problem List   Diagnosis   • Aortic valve stenosis   • Fatigue   • MI (mitral incompetence)   • PVC (premature ventricular contraction)   • Legally blind   • Essential hypertension   • Hypertriglyceridemia   • Pulmonary hypertension   • Paroxysmal atrial fibrillation   • Anxiety   • Stage 4 chronic kidney disease   • Chronic pain disorder   • Degeneration of lumbar intervertebral disc   • Type 2 diabetes mellitus with hyperglycemia   • Esophageal reflux   • Gastroparesis   • Hiatal hernia   • Iatrogenic hypothyroidism   • Macular degeneration   • Malignant neoplasm of uterus   • Osteoarthritis of knee   • Peripheral nerve disease   • Secondary hyperparathyroidism   • Thrombocytopenia   • Chronic heart failure with preserved ejection fraction   • Other cirrhosis of liver   • Hypothyroidism   • Nonrheumatic tricuspid valve regurgitation   • Anemia, chronic disease   • Hyponatremia   • Closed fracture of fifth lumbar vertebra   • Closed fracture of fifth lumbar vertebra, unspecified fracture morphology, initial encounter   • Diabetic polyneuropathy associated with type 2 diabetes mellitus   • Chronic ITP (idiopathic thrombocytopenia)   • Chronic midline low back pain with bilateral sciatica   • Acute deep vein thrombosis of calf, bilateral   • Acute left-sided low back pain with left-sided sciatica   • Urine retention   • Rib fracture   • Acute on chronic combined systolic and diastolic congestive heart failure   • Acute UTI   • Blindness right eye category 3, blindness left eye category 3   • Cognitive communication deficit   • Pseudomonas (aeruginosa) (mallei) (pseudomallei) as the cause of diseases classified elsewhere     Past Medical History:   Diagnosis Date    • Aortic valve stenosis     s/p tissue AVR   • Back pain    • CKD (chronic kidney disease)    • Colitis due to Clostridioides difficile 01/26/2022   • Diastolic dysfunction     Grade 2 per echocardiogram 2013   • Diverticulosis    • Exertional shortness of breath    • Heart disease    • Hiatal hernia    • Hyperlipidemia    • Hypertension    • Hyperthyroidism    • Hypertriglyceridemia 05/31/2018   • Hypothyroidism    • L5 compression fracture (HCC) 08/2022   • Left ventricular hypertrophy    • Legally blind    • Liver disease    • Low back pain    • Macular degeneration    • Mitral regurgitation    • Osteoarthritis of hip    • Osteoporosis    • Pancreatitis 01/26/2022   • Paroxysmal atrial fibrillation    • Peripheral neuropathy    • Premature ventricular contractions    • Pulmonary hypertension    • Renal insufficiency syndrome    • Type 2 diabetes mellitus    • Uterine cancer      Past Surgical History:   Procedure Laterality Date   • AORTIC VALVE REPAIR/REPLACEMENT     • CATARACT EXTRACTION      1970, 1999   • CHOLECYSTECTOMY     • ENDOSCOPY  08/15/2014    no gross lesions in stomach/duodenum, erythrematous mucosa in stomach   • EPIDURAL BLOCK     • HYSTERECTOMY  2007   • STERNOTOMY        General Information     Row Name 05/04/23 1127          Physical Therapy Time and Intention    Document Type therapy note (daily note)  -CB     Mode of Treatment individual therapy;physical therapy  -CB     Row Name 05/04/23 1127          General Information    Existing Precautions/Restrictions fall  -CB     Barriers to Rehab visual deficit   legally blind  -CB     Row Name 05/04/23 1127          Cognition    Orientation Status (Cognition) oriented x 4  -CB     Row Name 05/04/23 1127          Safety Issues, Functional Mobility    Impairments Affecting Function (Mobility) balance;coordination;endurance/activity tolerance;postural/trunk control  -CB           User Key  (r) = Recorded By, (t) = Taken By, (c) = Cosigned By     Initials Name Provider Type    CB Dalia Vásquez, PT Physical Therapist               Mobility     Row Name 05/04/23 1128          Bed Mobility    Bed Mobility supine-sit;sit-supine  -CB     Supine-Sit [place] (Bed Mobility) minimum assist (75% patient effort)  -CB     Sit-Supine [place] (Bed Mobility) minimum assist (75% patient effort)  -CB     Assistive Device (Bed Mobility) leg ;head of bed elevated;bed rails  -CB     Row Name 05/04/23 1128          Sit-Stand Transfer    Sit-Stand [place] (Transfers) contact guard;minimum assist (75% patient effort);2 person assist;verbal cues  -CB     Assistive Device (Sit-Stand Transfers) walker, front-wheeled  -CB     Comment, (Sit-Stand Transfer) x2 STS  -CB     Row Name 05/04/23 1128          Gait/Stairs (Locomotion)    [place] Level (Gait) minimum assist (75% patient effort);1 person to manage equipment;1 person assist  -CB     Assistive Device (Gait) walker, front-wheeled  -CB     Distance in Feet (Gait) 40ft  -CB     Deviations/Abnormal Patterns (Gait) paul decreased;antalgic;base of support, wide;festinating/shuffling;stride length decreased;weight shifting decreased  -CB     Bilateral Gait Deviations forward flexed posture   -CB     Comment, (Gait/Stairs) excessive forward flexed posture which pt states is baseline  -CB           User Key  (r) = Recorded By, (t) = Taken By, (c) = Cosigned By    Initials Name Provider Type    Dalia Benjamin PT Physical Therapist               Obj/Interventions     Row Name 05/04/23 1132          Balance    Balance Assessment standing static balance;standing dynamic balance  -CB     Static Standing Balance contact guard  -CB     Dynamic Standing Balance minimal assist  -CB     Position/Device Used, Standing Balance supported;walker, front-wheeled  -CB     Balance Interventions sitting;standing;sit to stand;supported;static;dynamic;minimal challenge  -CB           User Key  (r) = Recorded By, (t) = Taken  By, (c) = Cosigned By    Initials Name Provider Type    Dalia Benjamin, PT Physical Therapist               Goals/Plan    No documentation.                Clinical Impression     Row Name 05/04/23 1132          Pain    Pretreatment Pain Rating 0/10 - no pain  -CB     Row Name 05/04/23 1132          Plan of Care Review    Plan of Care Reviewed With patient  -CB     Progress improving  -CB     Outcome Evaluation Patient is agreeable to PT this AM. She improved overall functional mobility. Today she ambulated 40ft using rwx requiring Jm and completed STSx2 to rwx requiring CGA-minAx2. Pt ambulated in excessive forward flexion and family at bedside states this is normal for pt. She will continue to benefit from skilled PT to address functional deficits. Plan is home [facility] 24/7 assist.  -CB     Row Name 05/04/23 1132          Positioning and Restraints    Pre-Treatment Position in bed  -CB     Post Treatment Position bed  -CB     In Bed notified nsg;fowlers;call light within reach;encouraged to call for assist;exit alarm on;with family/caregiver;side rails up x2  -CB           User Key  (r) = Recorded By, (t) = Taken By, (c) = Cosigned By    Initials Name Provider Type    Dalia Benjamin, PT Physical Therapist               Outcome Measures     Row Name 05/04/23 1139 05/04/23 0844       How much help from another person do you currently need...    Turning from your back to your side while in flat bed without using bedrails? 3  -CB 2  -DC    Moving from lying on back to sitting on the side of a flat bed without bedrails? 3  -CB 2  -DC    Moving to and from a bed to a chair (including a wheelchair)? 3  -CB 2  -DC    Standing up from a chair using your arms (e.g., wheelchair, bedside chair)? 3  -CB 2  -DC    Climbing 3-5 steps with a railing? 2  -CB 2  -DC    To walk in hospital room? 3  -CB 2  -DC    AM-PAC 6 Clicks Score (PT) 17  -CB 12  -DC    Highest level of mobility 5 --> Static standing  -CB 4 --> Transferred  to chair/commode  -DC    Row Name 05/04/23 1139          Functional Assessment    Outcome Measure Options AM-PAC 6 Clicks Basic Mobility (PT)  -CB           User Key  (r) = Recorded By, (t) = Taken By, (c) = Cosigned By    Initials Name Provider Type    DC Malina Courtney, RN Registered Nurse    CB Dalia Vásquez, PT Physical Therapist                             Physical Therapy Education     Title: PT OT SLP Therapies (Done)     Topic: Physical Therapy (Done)     Point: Mobility training (Done)     Learning Progress Summary           Patient Acceptance, E,TB, VU,NR by  at 5/4/2023 1140    Acceptance, E,TB,D, VU,NR by  at 5/3/2023 1655    Acceptance, E,D, DU by  at 5/2/2023 0956   Family Acceptance, E,TB,D, VU,NR by  at 5/3/2023 1655                   Point: Home exercise program (Done)     Learning Progress Summary           Patient Acceptance, E,TB,D, VU,NR by  at 5/3/2023 1655    Acceptance, E,D, DU by  at 5/2/2023 0956   Family Acceptance, E,TB,D, VU,NR by  at 5/3/2023 1655                   Point: Body mechanics (Done)     Learning Progress Summary           Patient Acceptance, E,TB,D, VU,NR by  at 5/3/2023 1655    Acceptance, E,D, DU by  at 5/2/2023 0956   Family Acceptance, E,TB,D, VU,NR by  at 5/3/2023 1655                   Point: Precautions (Done)     Learning Progress Summary           Patient Acceptance, E,TB,D, VU,NR by  at 5/3/2023 1655    Acceptance, E,D, DU by  at 5/2/2023 0956   Family Acceptance, E,TB,D, VU,NR by  at 5/3/2023 1655                               User Key     Initials Effective Dates Name Provider Type Discipline    PC 06/16/21 -  Lucina Downs, PT Physical Therapist PT    JM 03/07/18 -  Dasia Cervantes PTA Physical Therapist Assistant PT    CB 10/22/21 -  Dalia Vásquez, CANDY Physical Therapist PT              PT Recommendation and Plan     Plan of Care Reviewed With: patient  Progress: improving  Outcome Evaluation: Patient is agreeable to PT this AM.  She improved overall functional mobility. Today she ambulated 40ft using rwx requiring Jm and completed STSx2 to rwx requiring CGA-minAx2. Pt ambulated in excessive forward flexion and family at bedside states this is normal for pt. She will continue to benefit from skilled PT to address functional deficits. Plan is home [facility] 24/7 assist.     Time Calculation:    PT Charges     Row Name 05/04/23 1141             Time Calculation    Start Time 0952  -CB      Stop Time 1002  -CB      Time Calculation (min) 10 min  -CB      PT Received On 05/04/23  -CB      PT - Next Appointment 05/05/23  -CB         Time Calculation- PT    Total Timed Code Minutes- PT 10 minute(s)  -CB         Timed Charges    73624 - PT Therapeutic Activity Minutes 10  -CB         Total Minutes    Timed Charges Total Minutes 10  -CB       Total Minutes 10  -CB            User Key  (r) = Recorded By, (t) = Taken By, (c) = Cosigned By    Initials Name Provider Type    CB Dalia Vásquez, PT Physical Therapist              Therapy Charges for Today     Code Description Service Date Service Provider Modifiers Qty    24556864246 HC PT THERAPEUTIC ACT EA 15 MIN 5/4/2023 Daila Vásquez, PT GP 1    37812224224 HC PT THER SUPP EA 15 MIN 5/4/2023 Dalia Vásquez, PT GP 1          PT G-Codes  Outcome Measure Options: AM-PAC 6 Clicks Basic Mobility (PT)  AM-PAC 6 Clicks Score (PT): 17  PT Discharge Summary  Anticipated Discharge Disposition (PT): home with 24/7 care, home with home health    Dalia Vásquez PT  5/4/2023

## 2023-05-05 NOTE — OUTREACH NOTE
Prep Survey    Flowsheet Row Responses   [facility] facility patient discharged from? [facility]   Is LACE score < 7 ? No   Eligibility Readm Mgmt   Discharge diagnosis Acute UTI    Does the patient have one of the following disease processes/diagnoses(primary or secondary)? Other   Does the patient have Home health ordered? Yes   What is the Home health agency?  [facility]- pending   Is there a DME ordered? No   Prep survey completed? Yes          Melita ROMERO - Registered Nurse

## 2023-05-05 NOTE — CASE MANAGEMENT/SOCIAL WORK
Case Management Discharge Note      Final Note: Discharged home with [facility] and family    Provided Post Acute Provider List?: N/A    Selected Continued Care - Discharged on 5/4/2023 Admission date: 4/30/2023 - Discharge disposition: [place] [facility]    Destination    No services have been selected for the patient.              Durable Medical Equipment    No services have been selected for the patient.              Dialysis/Infusion    No services have been selected for the patient.              Home Medical Care Coordination complete.    Service Provider Selected Services Address Phone Fax Patient Preferred    [facility] [address] [address] [phone] --          Therapy    No services have been selected for the patient.              Community Resources    No services have been selected for the patient.              Community & DME    No services have been selected for the patient.                Selected Continued Care - Prior Encounters Includes continued care and service providers with selected services from prior encounters from 1/30/2023 to 5/4/2023    Discharged on 4/7/2023 Admission date: 4/4/2023 - Discharge disposition: Home-Health Care [facility]    Home Medical Care     Service Provider Selected Services Address Phone Fax Patient Preferred    [facility] [address], [address], [place] [address] [phone] --                Discharged on 3/14/2023 Admission date: 3/7/2023 - Discharge disposition: Skilled Nursing Facility (DC - External)    Destination     Service Provider Selected Services Address Phone Fax Patient Preferred    [facility] [address] [address] [phone] --          Home Medical Care     Service Provider Selected Services Address Phone Fax Patient  Preferred    [facility] [address], [address], [place] [facility] [id number] [phone] --                         Final Discharge Disposition Code: 06 - home with home health care

## 2023-05-08 ENCOUNTER — READMISSION MANAGEMENT (OUTPATIENT)
Dept: CALL CENTER | Facility: HOSPITAL | Age: 88
End: 2023-05-08
Payer: MEDICARE

## 2023-05-08 ENCOUNTER — TELEPHONE (OUTPATIENT)
Dept: CARDIOLOGY | Facility: CLINIC | Age: 88
End: 2023-05-08
Payer: MEDICARE

## 2023-05-08 LAB
BACTERIA SPEC AEROBE CULT: NORMAL
BACTERIA SPEC AEROBE CULT: NORMAL

## 2023-05-08 NOTE — TELEPHONE ENCOUNTER
----- Message from RIGO Ford sent at 5/8/2023  3:17 PM EDT -----  Let patient know that her blood cultures were negative showing no evidence of bloodstream infection and that infection of her prosthetic valve given these normal cultures would be a very unlikely.  ----- Message -----  From: Lab, Background User  Sent: 5/8/2023  11:30 AM EDT  To: Casey Thompson MD

## 2023-05-08 NOTE — OUTREACH NOTE
Medical Week 1 Survey    Flowsheet Row Responses   [facility] [place] patient discharged from? [facility]   Does the patient have one of the following disease processes/diagnoses(primary or secondary)? Other   Week 1 attempt successful? Yes   Call start time 0837   Call end time 0839   Discharge diagnosis Acute UTI    Meds reviewed with patient/caregiver? Yes   Is the patient having any side effects they believe may be caused by any medication additions or changes? No   Does the patient have all medications ordered at discharge? Yes   Is the patient taking all medications as directed (includes completed medication regime)? Yes   Does the patient have a primary care provider?  Yes   Does the patient have an appointment with their PCP within 7 days of discharge? Greater than 7 days   Comments regarding PCP Patient states she thinks her pcp appointment is in June, but she is going to call today to see if she can get a sooner appointment.    Nursing Interventions Educated patient on importance of making appointment   Has the patient kept scheduled appointments due by today? N/A   What is the Home health agency?  [facility]- pending   Has home health visited the patient within 72 hours of discharge? Yes  [Epic shows that [facility] her, but [facility] ]   Psychosocial issues? No   Did the patient receive a copy of their discharge instructions? Yes   Nursing interventions Reviewed instructions with patient   What is the patient's perception of their health status since discharge? Improving   Is the patient/caregiver able to teach back signs and symptoms related to disease process for when to call PCP? Yes   Is the patient/caregiver able to teach back signs and symptoms related to disease process for when to call 911? Yes   Is the patient/caregiver able to teach back the hierarchy of who to call/visit for symptoms/problems? PCP, Specialist,  Home health nurse, Urgent Care, ED, 911 Yes   If the patient is a current smoker, are they able to teach back resources for cessation? Not a smoker   Week 1 call completed? Yes          Tiffani HILL - Licensed Nurse

## 2023-05-09 ENCOUNTER — TELEPHONE (OUTPATIENT)
Dept: CARDIOLOGY | Facility: CLINIC | Age: 88
End: 2023-05-09
Payer: MEDICARE

## 2023-05-09 NOTE — PROGRESS NOTES
Patient ID: Helena Carmen is a 89 y.o. female is here today for follow-up.    This is a telephone only visit that was performed with the patient at Home and Dr. Erickson at the [facility] in [place], [place]. Verbal consent to participate in telephone only  visit was obtained. I discussed with the patient the nature of our telemedicine visits, that:    - I would evaluate the patient and recommend diagnostics and treatments based on my assessment  - Our sessions are not being recorded and that personal health information is protected  - Our team would provide follow up care in person if/when the patient needs it    This duration of this call was 15 minutes.    Subjective     The patient was seen in consultation with No chief complaint on file.  .  L5 compression fracture    History of Present Illness  Deepika has slowly having improvement in her pain.  She still continues to wear the brace and still takes over-the-counter medications to treat her pain.      While in the room and during my examination of the patient I wore a mask and eye protection.  I washed my hands before and after this patient encounter.  The patient was also wearing a mask.    The following portions of the patient's history were reviewed and updated as appropriate: allergies, current medications, past family history, past medical history, past social history, past surgical history and problem list.    Review of Systems     Past Medical History:   Diagnosis Date   • Aortic valve stenosis     s/p tissue AVR   • Back pain    • CKD (chronic kidney disease)    • Colitis due to Clostridioides difficile 01/26/2022   • Diastolic dysfunction     Grade 2 per echocardiogram 2013   • Diverticulosis    • Exertional shortness of breath    • Heart disease    • Hiatal hernia    • Hyperlipidemia    • Hypertension    • Hyperthyroidism    • Hypertriglyceridemia 05/31/2018   • Hypothyroidism    • L5 compression fracture (HCC) 08/2022    • Left ventricular hypertrophy    • Legally blind    • Liver disease    • Low back pain    • Macular degeneration    • Mitral regurgitation    • Osteoarthritis of hip    • Osteoporosis    • Pancreatitis 2022   • Paroxysmal atrial fibrillation    • Peripheral neuropathy    • Premature ventricular contractions    • Pulmonary hypertension    • Renal insufficiency syndrome    • Type 2 diabetes mellitus    • Uterine cancer        Allergies   Allergen Reactions   • Baclofen Unknown - Low Severity     Yeast infection   • Erythromycin Unknown (See Comments) and Other (See Comments)     Pt states she does not remember but it was many years ago  Pt states she does not remember but it was many years ago   • Furosemide Other (See Comments) and Unknown (See Comments)     Other reaction(s): Other: PER PT REP KIDNEY ISSUES, Other  Other reaction(s): Other: PER PT REP KIDNEY ISSUES, Other     • Statins Myalgia   • Cephalexin Other (See Comments) and Unknown (See Comments)     2022 Pt has tolerated ceftriaxone and cefepime during admission.   Pt states she does not remember reaction but it was many years ago   • Penicillins Rash   • Sulfa Antibiotics Itching and Rash       Family History   Problem Relation Age of Onset   • Heart disease Mother    • Hypertension Mother    • Stroke Mother    • Diabetes Mother         mellitus   • Other Other         cardiovascular disorder       Social History     Socioeconomic History   • Marital status:    Tobacco Use   • Smoking status: Former     Packs/day: 0.50     Types: Cigarettes     Quit date: 7/10/1969     Years since quittin.8   • Smokeless tobacco: Never   Vaping Use   • Vaping Use: Never used   Substance and Sexual Activity   • Alcohol use: No     Comment: caffeine use - coffee 2 cups daily   • Drug use: No   • Sexual activity: Defer       Past Surgical History:   Procedure Laterality Date   • AORTIC VALVE REPAIR/REPLACEMENT     • CATARACT EXTRACTION      ,  1999   • CHOLECYSTECTOMY     • ENDOSCOPY  08/15/2014    no gross lesions in stomach/duodenum, erythrematous mucosa in stomach   • EPIDURAL BLOCK     • HYSTERECTOMY  2007   • STERNOTOMY           Objective     There were no vitals filed for this visit.  There is no height or weight on file to calculate BMI.    Physical Exam    Neurologic Exam    Assessment & Plan   Independent Review of Radiographic Studies:      I personally reviewed the images from the following studies.    CT of the abdomen pelvis shows stable compression deformity at L5, appears to be healed    Assessment/Plan:    Medical Decision Making:      No further work-up or follow-up.  Recommend continue over-the-counter treatment for back pain.  No activity restrictions, recommend brace when up and about.         Diagnoses and all orders for this visit:    1. Compression fracture of L5 vertebra with routine healing (Primary)               Casey Erickson MD  05/18/23  15:27 EDT

## 2023-05-09 NOTE — TELEPHONE ENCOUNTER
Caller: ARAM SPAIN    Relationship to patient: Emergency Contact    Best call back number: [phone]    Patient is needing: TO MEET WITH DR. DIXON AS SOON AS POSSIBLE. PT WAS IN EMERGENCY ROOM AND WAS SEPTIC AND HOME HEALTH DROPPED HER AS A PATIENT BECAUSE SHE WAS TOO SICK. PT IS NEEDING TO HAVE LABS DONE TO CHECK HER LEVELS AND SCHEDULE A HOSPITAL F/U APPT WITH DR. DIXON.

## 2023-05-10 ENCOUNTER — HOSPITAL ENCOUNTER (OUTPATIENT)
Dept: GENERAL RADIOLOGY | Facility: HOSPITAL | Age: 88
Discharge: HOME | End: 2023-05-10
Admitting: NEUROLOGICAL SURGERY
Payer: MEDICARE

## 2023-05-10 ENCOUNTER — LAB (OUTPATIENT)
Dept: LAB | Facility: HOSPITAL | Age: 88
End: 2023-05-10
Payer: MEDICARE

## 2023-05-10 ENCOUNTER — OFFICE VISIT (OUTPATIENT)
Dept: ONCOLOGY | Facility: CLINIC | Age: 88
End: 2023-05-10
Payer: MEDICARE

## 2023-05-10 VITALS
SYSTOLIC BLOOD PRESSURE: 145 MMHG | RESPIRATION RATE: 16 BRPM | HEART RATE: 57 BPM | BODY MASS INDEX: 37.11 KG/M2 | TEMPERATURE: 97.8 F | HEIGHT: 57 IN | WEIGHT: 172 LBS | DIASTOLIC BLOOD PRESSURE: 63 MMHG | OXYGEN SATURATION: 98 %

## 2023-05-10 DIAGNOSIS — S32.058D OTHER CLOSED FRACTURE OF FIFTH LUMBAR VERTEBRA WITH ROUTINE HEALING, SUBSEQUENT ENCOUNTER: ICD-10-CM

## 2023-05-10 DIAGNOSIS — D69.3 CHRONIC ITP (IDIOPATHIC THROMBOCYTOPENIA): ICD-10-CM

## 2023-05-10 DIAGNOSIS — D63.8 ANEMIA, CHRONIC DISEASE: ICD-10-CM

## 2023-05-10 DIAGNOSIS — D69.3 CHRONIC ITP (IDIOPATHIC THROMBOCYTOPENIA): Primary | ICD-10-CM

## 2023-05-10 LAB
ALBUMIN SERPL-MCNC: 3.7 G/DL (ref 3.5–5.2)
ALBUMIN/GLOB SERPL: 1.3 G/DL (ref 1.1–2.4)
ALP SERPL-CCNC: 54 U/L (ref 38–116)
ALT SERPL W P-5'-P-CCNC: 25 U/L (ref 0–33)
ANION GAP SERPL CALCULATED.3IONS-SCNC: 14 MMOL/L (ref 5–15)
AST SERPL-CCNC: 20 U/L (ref 0–32)
BASOPHILS # BLD AUTO: 0.03 10*3/MM3 (ref 0–0.2)
BASOPHILS NFR BLD AUTO: 0.4 % (ref 0–1.5)
BILIRUB SERPL-MCNC: 0.3 MG/DL (ref 0.2–1.2)
BUN SERPL-MCNC: 47 MG/DL (ref 6–20)
BUN/CREAT SERPL: 26.7 (ref 7.3–30)
CALCIUM SPEC-SCNC: 9.5 MG/DL (ref 8.5–10.2)
CHLORIDE SERPL-SCNC: 100 MMOL/L (ref 98–107)
CO2 SERPL-SCNC: 26 MMOL/L (ref 22–29)
CREAT SERPL-MCNC: 1.76 MG/DL (ref 0.6–1.1)
DEPRECATED RDW RBC AUTO: 57.2 FL (ref 37–54)
EGFRCR SERPLBLD CKD-EPI 2021: 26.9 ML/MIN/1.73
EOSINOPHIL # BLD AUTO: 0.03 10*3/MM3 (ref 0–0.4)
EOSINOPHIL NFR BLD AUTO: 0.4 % (ref 0.3–6.2)
ERYTHROCYTE [DISTWIDTH] IN BLOOD BY AUTOMATED COUNT: 16 % (ref 12.3–15.4)
GLOBULIN UR ELPH-MCNC: 2.9 GM/DL (ref 1.8–3.5)
GLUCOSE SERPL-MCNC: 158 MG/DL (ref 74–124)
HCT VFR BLD AUTO: 38 % (ref 34–46.6)
HGB BLD-MCNC: 11.4 G/DL (ref 12–15.9)
IMM GRANULOCYTES # BLD AUTO: 0.07 10*3/MM3 (ref 0–0.05)
IMM GRANULOCYTES NFR BLD AUTO: 0.8 % (ref 0–0.5)
LYMPHOCYTES # BLD AUTO: 0.91 10*3/MM3 (ref 0.7–3.1)
LYMPHOCYTES NFR BLD AUTO: 10.9 % (ref 19.6–45.3)
MCH RBC QN AUTO: 28.8 PG (ref 26.6–33)
MCHC RBC AUTO-ENTMCNC: 30 G/DL (ref 31.5–35.7)
MCV RBC AUTO: 96 FL (ref 79–97)
MONOCYTES # BLD AUTO: 0.23 10*3/MM3 (ref 0.1–0.9)
MONOCYTES NFR BLD AUTO: 2.7 % (ref 5–12)
NEUTROPHILS NFR BLD AUTO: 7.11 10*3/MM3 (ref 1.7–7)
NEUTROPHILS NFR BLD AUTO: 84.8 % (ref 42.7–76)
NRBC BLD AUTO-RTO: 0 /100 WBC (ref 0–0.2)
PLATELET # BLD AUTO: 133 10*3/MM3 (ref 140–450)
PLATELETS.RETICULATED NFR BLD AUTO: 2.9 % (ref 0.9–6.5)
PMV BLD AUTO: 10 FL (ref 6–12)
POTASSIUM SERPL-SCNC: 4.7 MMOL/L (ref 3.5–4.7)
PROT SERPL-MCNC: 6.6 G/DL (ref 6.3–8)
RBC # BLD AUTO: 3.96 10*6/MM3 (ref 3.77–5.28)
SODIUM SERPL-SCNC: 140 MMOL/L (ref 134–145)
WBC NRBC COR # BLD: 8.38 10*3/MM3 (ref 3.4–10.8)

## 2023-05-10 PROCEDURE — 80053 COMPREHEN METABOLIC PANEL: CPT

## 2023-05-10 PROCEDURE — 36415 COLL VENOUS BLD VENIPUNCTURE: CPT

## 2023-05-10 PROCEDURE — 72100 X-RAY EXAM L-S SPINE 2/3 VWS: CPT

## 2023-05-10 PROCEDURE — 85025 COMPLETE CBC W/AUTO DIFF WBC: CPT

## 2023-05-10 PROCEDURE — 85055 RETICULATED PLATELET ASSAY: CPT

## 2023-05-10 NOTE — PROGRESS NOTES
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  She was admitted 4/30/2023 and discharged 5/4/2023.  She was treated for UTI with broad-spectrum antibiotics.  She has an indwelling urinary catheter in place, was seen by urology with plans to keep the Hoffman in place for now.  She was discharged home with 24-hour caregivers present.  She was instructed to continue oral Levaquin until completion.  She took her last dose of Levaquin yesterday.  She is scheduled for follow-up with urology tomorrow.  She was discharged with a platelet count of 59,000.  She continues on prednisone 10 mg daily.  Platelets today have improved to 131,000.  She reports feeling fatigued and weak since discharge from the hospital, and would like to work with PT to help regain some strength.  She reports that care tenders will no longer accept her as a patient.  Appetite has remained adequate.  Bowels moving regularly.  Denies fever or chills.  Denies nausea or vomiting.  Denies new or worsening pain.  Denies signs or symptoms of bleeding.    Objective   Vital Signs:   /63   Pulse 57   Temp 97.8 °F (36.6 °C) (Temporal)   Resp 16   Ht 144.8 cm (57.01\")   Wt 78 kg (172 lb)   SpO2 98%   BMI 37.21 kg/m²       Physical Exam  Vitals reviewed.   HENT:      Head: Normocephalic.      Mouth/Throat:      Mouth: Mucous membranes are moist.      Pharynx: Oropharynx is clear.   Eyes:      Conjunctiva/sclera: Conjunctivae normal.      Pupils: Pupils are equal, round, and reactive to light.   Cardiovascular:      Rate and Rhythm: Normal rate.      Heart sounds: Normal heart sounds.   Pulmonary:      Effort: Pulmonary effort is normal.      Breath sounds: Normal breath sounds.   Abdominal:      General: Bowel sounds are normal.   Skin:     General: Skin " is warm and dry.      Findings: No rash.   Neurological:      General: No focal deficit present.      Mental Status: She is alert. Mental status is at baseline.      Motor: Weakness present.   Psychiatric:         Mood and Affect: Mood normal.         Behavior: Behavior normal.         Thought Content: Thought content normal.         Judgment: Judgment normal.        Result Review : Reviewed hospital records from admission as noted above and below.  CBC, IPF and CMP from today.       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  · Additional labs 3/3/2022 with positive BECCA at 1: 320 dilution with homogeneous pattern, negative EBCCA panel  · On 3/4/2022, B12 low normal at 238, folate greater than 20, negative serum protein electrophoresis and immunoelectrophoresis with free kappa light chain 65.6, free lambda light chain 37.1 and free light chain ratio 1.77 (appropriate for degree of renal dysfunction), LDH borderline elevated 238  · CT abdomen and pelvis 3/8/2022 with liver morphology suspicious for chronic liver disease, spleen normal in size at 10.4 cm.  No other abnormalities.  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    · Unclear whether  improvement in thrombocytopenia was related to steroids, B12 replacement, or  improvement in CHF/volume overload.  · Recommended decreasing prednisone dose down to 15 mg daily prior to transition to subacute nursing facility on 3/9/2022.  Patient however was discharged on prednisone 20 mg daily.  Requested that nursing facility forward labs weekly monitor platelet count and further gradually taper prednisone dose however this did not occur.  · On 3/23/2022, platelet count was 64,000.  Tapered prednisone from 20 down to 15 mg daily.  Intend to maintain platelet count above the 50-14557 range.  Consider additional treatment options with IVIG or rituximab if platelet count declines into the 30,000 range or below consistently.  · 3/31/2022 platelet count 65,000 and on 4/4/2022 prednisone was tapered down to 10 mg daily  · On 4/20/2022, platelet count of 96,000 and prednisone tapered down to 5 mg daily.  · Platelet count on 6/15/2022 92,000 with subsequent taper of prednisone to 5 mg every other day  · Platelet count did decline into the 40-33660 range on prednisone 5 mg every other day.  · Patient hospitalized 7/30 - 8/2/2022 due to COVID-19 infection/pneumonia.  She received dexamethasone alone.  During that time, platelet count was 46,000 on admission 7/30/2022 but increased up to 82,000 the time of discharge on 8/2/2022.    · Hospitalized again 8/5- 8/10/2022 due to symptoms of persistent weakness, fatigue, shortness of breath, cough, congestion following her COVID-19 infection. Bilateral lower extremity Doppler showed bilateral acute calf DVT.  Perfusion scan 8/8/2022 showed no evidence of pulmonary embolism.  Platelet count was in the 90-low 100,000 range and she was therefore anticoagulated initially with Lovenox and transitioned to Eliquis.  Transitioned from dexamethasone which she received for COVID-19 infection back to prednisone at 5 mg every other day.    · Platelet count subsequently decreased into the 60-72253 range.  On 8/19/2022 prednisone increased to 10 mg daily.    · With no  improvement in platelet count on 8/22/2022, prednisone dose increased to 15 mg daily.    · Patient was admitted yet again 8/24 - 8/29/2022 related to L5 compression fracture. With concern for ongoing prednisone in the setting of compression fracture, discussion regarding use of Promacta 50 mg daily with potential prednisone taper pending response.  · Patient subsequently transferred to subacute nursing facility.  Per family, obtained Promacta 50 mg daily and provided this to the nursing facility and she began the medication on 9/10/2022.  Promacta discontinued 9/14/2022 with concern regarding potential increased thrombotic risk and risk of increased LFTs.  Elected to maintain patient on low-dose prednisone.  · Prednisone decreased from 15 down to 10 mg daily 9/14/2022.  Platelet count 98,000.  · Patient hospitalized 10/16- 10/20/2022.  On admission platelet count 66,000, on discharge 10/20/2022 platelet count 69,000.  Patient discharged on prednisone 15 mg daily  · Subsequently prednisone was decreased to 10 mg on 12/7/2023.  · Platelet count has subsequently declined into the 40-39728 range.  Patient with no bleeding complications, prednisone maintained at 10 mg daily with plans for ongoing weekly lab monitoring with home health.  · Patient admitted again with back pain 3/7 - 3/14/2023.  Platelet count on 3/7/2023 was 41,000.  Platelet count declined to 38,000 on 3/8/2023 with elevated IPF at 10.9% consistent with her history of chronic ITP.  There was recommendation to pursue epidural injection.  In order to facilitate this, patient received IVIG daily x3 days on 3/9 - 3/11/2023 with a total dose of 85 g.  Platelet count responded, increased up to 132,000 on 3/13/2023 at which time the epidural injection was performed.  On 3/14/2023 prior to discharge, platelet count was 160,000.  Patient was maintained on prednisone 10 mg daily anticipating only transient effect from IVIG with need for ongoing prednisone to  maintain her counts in the future.  Patient was discharged to subacute nursing facility on 3/14/2023.  · Additional labs were drawn at the nursing facility showing platelet count on 3/15/2023 up to 191,000.  The patient today reports that she is improving in terms of her back pain and mobility.    · She was again hospitalized from 4/30/2023-4/30/2023 - 5/4/2023 for UTI.  She was treated with broad-spectrum antibiotics.  She was discharged home with 24-hour caregivers available.  She was advised to continue Levaquin until completion.  Discharged with platelets 59,000.  · 5/10/2023: Seen for hospital follow-up.  Continues on prednisone 10 mg daily.  Platelets today 133,000 and IPF 2.9.  She does continue to bruise easily, as expected related to ongoing steroid use and chronic thrombocytopenia.  We will continue prednisone 10 mg daily.  We will set patient up for home health with weekly CBC to continue monitoring platelets.  She is scheduled for telephone visit with Dr. Sherman in 2 weeks.    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  Labs during hospitalization were again consistent with anemia secondary to chronic disease on 8/26/2022 with iron 36, ferritin 241, iron saturation 13%, TIBC 276, folate 6.69, B12 417.  Patient was receiving oral iron however was not iron deficient and was experiencing constipation, oral iron discontinued.    · Hemoglobin did decline during hospitalization 3/7 - 3/14/2023 from 13.2 on admission down into the 10-11 range.  Anemia  evaluation performed on 3/12/2023 with iron 135, ferritin 295, iron saturation 51%, TIBC 264, folate 5.94, B12 236.  Hemoglobin was 10.5 at discharge.  · 5/10/2023: Hemoglobin today improved to 11.4.  Continues vitamin B12 1000 mcg daily.  Otherwise labs consistent with anemia secondary to chronic disease/CKD 3.  Continue to monitor.    *Borderline B12 deficiency  · B12 level on 3/4/2022 was 238  · Patient initiated B12 1000 mcg IM injection daily x3 days and oral B12 1000 mcg daily  · Labs on 4/20/2022 with B12 811  · Patient was briefly receiving oral B12 replacement, not currently receiving B12.  · B12 level on 8/27/2022 was 417.  · B12 during hospitalization 3/12/2023 low normal at 236.  We will begin oral B12 1000 mcg daily.  · Continues vitamin B12 daily.     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  Spleen however was normal in size.  · LFTs normal on 3/2/2022  · CT abdomen and pelvis 3/8/2022 with liver morphology suspicious for chronic liver disease, spleen normal in size at 10.4 cm.  No other abnormalities.  · Significance of the liver appearance on scans is unclear.  · On 3/23/2022, elevated LFTs with ALT 46, AST 40, normal total bilirubin 0.4.  · CT abdomen and pelvis 3/9/2023 with continued evidence of hepatic steatosis and cirrhotic morphology of the liver, no splenomegaly.  · LFTs have remained mildly elevated.    *GI prophylaxis  · Patient is currently receiving Pepcid 10 mg twice daily  while on steroids    *Pulmonary nodule  · Patient underwent CT scan at [facility] urology on 9/7/2021 that showed a 4 mm subpleural left lower lobe nodule  · Patient was scheduled for follow-up CT chest with thoracic surgery however declined to proceed with the scan.  Given her age and limited ability to treat a malignancy and with a high probability that this is a benign finding, scan was canceled.  · CT chest during hospitalization 8/7/2022 with no mention of pulmonary nodule.  · CT chest 3/12/2023 with no mention of pulmonary nodule.    *Bilateral calf DVT  · Patient was hospitalized 7/30 - 8/2/2022 due to COVID-19 infection/pneumonia.  She received dexamethasone alone.    · She was hospitalized again 8/5- 8/10/2022 due to symptoms of persistent weakness, fatigue, shortness of breath, cough, congestion following her COVID-19 infection.  During that hospitalization, she underwent CT chest 8/7/2022 which showed no significant abnormal findings.  Bilateral lower extremity Doppler showed bilateral acute calf DVT.  Perfusion scan 8/8/2022 showed no evidence of pulmonary embolism.  Platelet count was in the 90-low 100,000 range and she was therefore anticoagulated initially with Lovenox and transitioned to Eliquis 5 mg twice daily.   · During hospitalization, doppler 10/17/2022 lower extremities showed evolution of her previous bilateral acute calf DVT to subacute on the right and chronic on the left.  Eliquis was held during hospital stay and was not resumed at time of discharge.  · Right upper extremity swelling prompted right upper extremity Doppler on 3/13/2023 which was negative during hospitalization.  · Patient currently remains off anticoagulation.  She is quite sedentary and is certainly at risk for progression of thrombosis.  Platelet count has remained in the 40-73583 range.  Anticoagulation would likely confer significant risk for bleeding in this situation.  There has been no clinical evidence of progressive or  recurrent thrombosis.  Platelets today are improved to 133,000, we will recheck CBC in 1 week.    *L5 compression fracture  · Patient hospitalized 8/24 - 8/29/2022 related to L5 compression fracture.    · CT scans showed compression fracture at L5.    · MRI lumbar spine 8/24/2022 redemonstrated L5 compression fracture.    · Conservative management of compression fracture was recommended by neurosurgery.  · Patient hospitalized with significant pain from L5 compression fracture 10/6 - 10/20/2022. CT scan on 10/16/2022 showed slightly more loss of height in the L5 fracture.  MRI of the lumbar spine on 10/17/2022 showed 20% loss of height at L5 with multiple fracture planes.  She was evaluated by neurosurgery and by pain management.  In light of her thrombocytopenia, it was felt that she was not a candidate for epidural injection.  · Hospitalization 3/7 - 3/14/2023.  She developed again worsening difficulty with back pain radiating to her bilateral lower extremities affecting her ability to ambulate.  MRI lumbar spine 3/7/2023 showed slight worsening of L5 compression fracture and further loss of height in the neural foramen at L5/S1 in addition to other degenerative changes producing foraminal narrowing.  Patient received IVIG to improve platelet count and facilitate epidural injection.  Platelet count increased up to 132,000 on 3/13/2023 at which time the epidural injection was performed. Patient was discharged to subacute nursing facility on 3/14/2023.  · Patient reports pain has improved after epidural injection but does persist.  Mobility has improved, patient continuing to work with physical therapy at the nursing facility.    *Electrolyte disturbances  · Patient with ongoing difficulty with hyponatremia, hyperkalemia.  Labs from 3/15/2023 showed potassium 5.7 and sodium 126.  We are not managing her electrolytes, physician at the nursing facility is managing her other medical conditions including this issue.   Communicated this to the patient and her daughter.   · 5/10/2023: Sodium today 140, past potassium 4.7.    Plan:   1. Continue prednisone 10 mg daily  2. Referral placed to [facility] for weekly CBC with results faxed to our office, catheter care, and PT eval and treat.  3. Continue oral B12 1000 mcg daily.    4. The patient currently remains off of anticoagulation with Eliquis.  Patient felt to have increased risk for bleeding complications on anticoagulation with platelet count in the current range  5. In general, we will plan to increase prednisone dose if the platelet count declines below 40,000 or if any significant bleeding issues occur   6. We will plan ongoing weekly CBC, hopefully to be completed by [facility].  7. Telephone visit to be scheduled 5/23/2023 with Dr. Sherman.    I spent 55 minutes caring for Helena on this date of service. This time includes time spent by me in the following activities: preparing for the visit, reviewing tests, obtaining and/or reviewing a separately obtained history, performing a medically appropriate examination and/or evaluation, counseling and educating the patient/family/caregiver, ordering medications, tests, or procedures, documenting information in the medical record and care coordination.     Tessie Mcwilliams, APRN  05/10/23

## 2023-05-12 ENCOUNTER — HOME HEALTH ADMISSION (OUTPATIENT)
Dept: HOME HEALTH SERVICES | Facility: HOME HEALTHCARE | Age: 88
End: 2023-05-12
Payer: MEDICARE

## 2023-05-15 ENCOUNTER — TELEPHONE (OUTPATIENT)
Dept: NEUROSURGERY | Facility: CLINIC | Age: 88
End: 2023-05-15
Payer: MEDICARE

## 2023-05-15 NOTE — TELEPHONE ENCOUNTER
Called patient daughter back. Informed patient daughter I sent a message to  to ask if he will do a televisit with the patient.         12:17 pm:    Called patient daughter back and left VM. Informed that the visit on 05/18 will stay the same time with  but it is now changed to a televisit as her and the patient requested.

## 2023-05-15 NOTE — TELEPHONE ENCOUNTER
Caller: Radha Hoyos    Relationship to patient: Emergency Contact    Best call back number: [phone]    Type of visit: FOLLOW UP     Requested date: 5/15/23     If rescheduling, when is the original appointment: 5/18/23    Additional notes:    PATIENT'S DAUGHTER RADHA CALLED AND IS REQUESTING FOR NEXT APPOINTMENT TO BE SWITCHED TO A TELEVISIT.  STATES PATIENT WAS JUST IN THE HOSPITAL AND IS VERY WEAK.  PLEASE CALL RADHA WITH ANY SCHEDULING UPDATES.     THANK YOU!

## 2023-05-15 NOTE — TELEPHONE ENCOUNTER
Caller: HOSEA    Relationship to patient: DAUGHTER    Best call back number: [phone]    Patient is needing: THEY HAVE A FOLLOW UP APT NEXT WEEK HOWEVER THE DAUGHTER ADVISED THAT SHE HAS BEEN TRYING TO FIND OUT IF HER MOTHER SHOULD CONTINUE TO TAKE 5MG OF AMOLDIPINE THAT WAS PRESCRIBED WHEN SHE WAS IN THE HOSPITAL.     PLEASE CALL TO DISCUSS. THANK YOU.

## 2023-05-16 NOTE — TELEPHONE ENCOUNTER
Reviewed recommendations with Radha and she verbalized understanding of the recommendations.    Medication list updated for accuracy.    Thank you,  Aparna BARILLAS RN  Triage Nurse REBECCA

## 2023-05-16 NOTE — TELEPHONE ENCOUNTER
Called Helena Carmen for additional information.      Spoke with patient's daughter, Radha (ok per verbal RITA).  Radha reports patient has been taking amlodipine.  Patient was seen by Dr. Wick and he told patient to stop taking amlodipine and to increase hydralazine to 100 mg, BID.  Patient has not make this change yet.  Is cardiology ok with this change?    Radha is not currently with patient and provided phone number for Dasia, who is currently with patient.    Called Dasia and she provided the following BP readings:    Date BP   11-May 150/48   12-May 145/63   13-May 142/62   14-May 167/59   15-May 145/64   16-May 143/62     Readings are 2 hours after BP meds.

## 2023-05-17 ENCOUNTER — READMISSION MANAGEMENT (OUTPATIENT)
Dept: CALL CENTER | Facility: HOSPITAL | Age: 88
End: 2023-05-17
Payer: MEDICARE

## 2023-05-17 NOTE — OUTREACH NOTE
Medical Week 2 Survey    Flowsheet Row Responses   [facility] patient discharged from? [place]   Does the patient have one of the following disease processes/diagnoses(primary or secondary)? Other   Week 2 attempt successful? No   Unsuccessful attempts Attempt 1          Deanna NIELSON - Licensed Nurse

## 2023-05-18 ENCOUNTER — OFFICE VISIT (OUTPATIENT)
Dept: NEUROSURGERY | Facility: CLINIC | Age: 88
End: 2023-05-18
Payer: MEDICARE

## 2023-05-18 DIAGNOSIS — S32.050D COMPRESSION FRACTURE OF L5 VERTEBRA WITH ROUTINE HEALING: Primary | ICD-10-CM

## 2023-05-22 ENCOUNTER — READMISSION MANAGEMENT (OUTPATIENT)
Dept: CALL CENTER | Facility: HOSPITAL | Age: 88
End: 2023-05-22
Payer: MEDICARE

## 2023-05-22 NOTE — OUTREACH NOTE
Medical Week 2 Survey    Flowsheet Row Responses   [facility] patient discharged from? [place]   Does the patient have one of the following disease processes/diagnoses(primary or secondary)? Other   Week 2 attempt successful? Yes   Call start time 1815   Discharge diagnosis Acute UTI    Call end time 1830   Person spoke with today (if not patient) and relationship juan carlos Mcgregor reviewed with patient/caregiver? Yes   Is the patient having any side effects they believe may be caused by any medication additions or changes? No   Does the patient have all medications ordered at discharge? Yes   Is the patient taking all medications as directed (includes completed medication regime)? Yes   Does the patient have a primary care provider?  Yes   Does the patient have an appointment with their PCP within 7 days of discharge? Yes   Has the patient kept scheduled appointments due by today? Yes   What is the Home health agency?  [facility] [place] [facility]- pending   Has home health visited the patient within 72 hours of discharge? Yes   Psychosocial issues? No   What is the patient's perception of their health status since discharge? Improving   Is the patient/caregiver able to teach back signs and symptoms related to disease process for when to call PCP? Yes   Is the patient/caregiver able to teach back signs and symptoms related to disease process for when to call 911? Yes   Is the patient/caregiver able to teach back the hierarchy of who to call/visit for symptoms/problems? PCP, Specialist, Home health nurse, Urgent Care, ED, 911 Yes   If the patient is a current smoker, are they able to teach back resources for cessation? Not a smoker   Week 2 Call Completed? Yes   Is the patient interested in additional calls from an ambulatory ?  NOTE:  applies to high risk patients requiring additional follow-up. No   Wrap up additional comments Dtr states pt is doing better, but having some nausea/hoarseness at this  time. Dtr advised to speak with Oncology tomorrow at appt about constant nausea. Dtr verbalized understanding.          Margaret HILL - Registered Nurse

## 2023-05-22 NOTE — PROGRESS NOTES
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  The patient is being followed regarding chronic ITP for which she is currently receiving prednisone 10 mg daily.      The patient has unfortunately been hospitalized twice since our last conversation.  She was hospitalized 4/4-/7/23 due to CHF exacerbation and again 4/30 - 5/4/2023 due to E. coli UTI/sepsis as well as acute MI.  She is now at home with home health assistance.  She still has a urinary catheter remaining in place.  She reports ongoing chronic fatigue, does continue to try working with physical therapy on a regular basis but finds this very difficult.  She has developed over the past week a sore throat and sinus congestion.  She denies any fevers, reports some mild chronic dyspnea on exertion that is unchanged.  She did discuss this with her primary care provider and a prescription for Levaquin has been sent today and she is due to see her PCP in 2 days.  She continues on prednisone 10 mg daily and we are monitoring weekly labs with home health that are to be faxed to our office to monitor her platelet count trying to maintain her count above the 40-80331 range.  She has not experienced any recent bleeding issues.      Objective   Vital Signs:   There were no vitals taken for this visit.    Physical Exam   Physical exam was not performed today as patient was evaluated via telephone visit.    Result Review : Reviewed multiple hospital records including discharge summaries, progress notes, laboratory studies as outlined above and below.  Reviewed CBC from 5/17/2023.       Assessment and Plan     *Thrombocytopenia with chronic ITP, question contribution from hypersplenism related to chronic liver disease (spleen normal in size however):  · Patient with  apparent longstanding history of thrombocytopenia  · Previous CT scan with suggestion of chronic liver disease/cirrhotic liver morphology, last CT 4/15/2019 with normal spleen  · Platelet count 3/22/2019 was 52,000 and on 1/26/2022 was 60,000  · On admission 3/2/2022, platelet count 39,000  · Additional labs 3/3/2022 with IPF elevated at 12.2% indicative of peripheral destructive process and has remained elevated in the 10-12% range.  · Additional labs 3/3/2022 with positive BECCA at 1: 320 dilution with homogeneous pattern, negative BECCA panel  · On 3/4/2022, B12 low normal at 238, folate greater than 20, negative serum protein electrophoresis and immunoelectrophoresis with free kappa light chain 65.6, free lambda light chain 37.1 and free light chain ratio 1.77 (appropriate for degree of renal dysfunction), LDH borderline elevated 238  · CT abdomen and pelvis 3/8/2022 with liver morphology suspicious for chronic liver disease, spleen normal in size at 10.4 cm.  No other abnormalities.  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    · Unclear whether  improvement in thrombocytopenia was related to steroids, B12 replacement, or improvement in CHF/volume overload.  · Recommended decreasing prednisone dose down to 15 mg daily prior to transition to subacute nursing facility on 3/9/2022.  Patient however was discharged on prednisone 20 mg daily.  Requested that nursing facility forward labs weekly monitor platelet count and further gradually taper prednisone dose however this did not occur.  · On 3/23/2022, platelet count was 64,000.  Tapered prednisone from 20 down to 15 mg daily.  Intend to maintain platelet count above the 50-16654 range.  Consider additional treatment options with IVIG or rituximab if platelet count declines into the 30,000 range or below consistently.  · 3/31/2022  platelet count 65,000 and on 4/4/2022 prednisone was tapered down to 10 mg daily  · On 4/20/2022, platelet count of 96,000 and prednisone tapered down to 5 mg daily.  · Platelet count on 6/15/2022 92,000 with subsequent taper of prednisone to 5 mg every other day  · Platelet count did decline into the 40-24535 range on prednisone 5 mg every other day.  · Patient hospitalized 7/30 - 8/2/2022 due to COVID-19 infection/pneumonia.  She received dexamethasone alone.  During that time, platelet count was 46,000 on admission 7/30/2022 but increased up to 82,000 the time of discharge on 8/2/2022.    · Hospitalized again 8/5- 8/10/2022 due to symptoms of persistent weakness, fatigue, shortness of breath, cough, congestion following her COVID-19 infection. Bilateral lower extremity Doppler showed bilateral acute calf DVT.  Perfusion scan 8/8/2022 showed no evidence of pulmonary embolism.  Platelet count was in the 90-low 100,000 range and she was therefore anticoagulated initially with Lovenox and transitioned to Eliquis.  Transitioned from dexamethasone which she received for COVID-19 infection back to prednisone at 5 mg every other day.    · Platelet count subsequently decreased into the 60-45709 range.  On 8/19/2022 prednisone increased to 10 mg daily.    · With no improvement in platelet count on 8/22/2022, prednisone dose increased to 15 mg daily.    · Patient was admitted yet again 8/24 - 8/29/2022 related to L5 compression fracture. With concern for ongoing prednisone in the setting of compression fracture, discussion regarding use of Promacta 50 mg daily with potential prednisone taper pending response.  · Patient subsequently transferred to subacute nursing facility.  Per family, obtained Promacta 50 mg daily and provided this to the nursing facility and she began the medication on 9/10/2022.  Promacta discontinued 9/14/2022 with concern regarding potential increased thrombotic risk and risk of increased LFTs.   Elected to maintain patient on low-dose prednisone.  · Prednisone decreased from 15 down to 10 mg daily 9/14/2022.  Platelet count 98,000.  · Patient hospitalized 10/16- 10/20/2022.  On admission platelet count 66,000, on discharge 10/20/2022 platelet count 69,000.  Patient discharged on prednisone 15 mg daily  · Subsequently prednisone was decreased to 10 mg on 12/7/2023.  · Platelet count has subsequently declined into the 40-45828 range.  Patient with no bleeding complications, prednisone maintained at 10 mg daily with plans for ongoing weekly lab monitoring with home health.  · Patient admitted again with back pain 3/7 - 3/14/2023.  Platelet count on 3/7/2023 was 41,000.  Platelet count declined to 38,000 on 3/8/2023 with elevated IPF at 10.9% consistent with her history of chronic ITP.  There was recommendation to pursue epidural injection.  In order to facilitate this, patient received IVIG daily x3 days on 3/9 - 3/11/2023 with a total dose of 85 g.  Platelet count responded, increased up to 132,000 on 3/13/2023 at which time the epidural injection was performed.  On 3/14/2023 prior to discharge, platelet count was 160,000.  Patient was maintained on prednisone 10 mg daily anticipating only transient effect from IVIG with need for ongoing prednisone to maintain her counts in the future.  Patient was discharged to subacute nursing facility on 3/14/2023.  · Patient hospitalized 4/4-4/7/23 due to CHF exacerbation and again 4/30 - 5/4/2023 due to E. coli UTI/sepsis as well as acute MI.   · Patient returns today in follow-up continuing on prednisone 10 mg daily chronic therapy in an attempt to maintain platelet count in an acceptable range due to her chronic ITP.  We are trying to avoid other more cumbersome therapies given her age and comorbidities.  Platelet count last checked on 5/17/2023 was 78,000.  We discussed trying to maintain platelet count above the 40-97199 range.  Patient is currently at home with home  health access and they will continue to draw labs on a weekly basis and fax them to our office.  We will have telephone visit scheduled again in 2 months.  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  Labs during hospitalization were again consistent with anemia secondary to chronic disease on 8/26/2022 with iron 36, ferritin 241, iron saturation 13%, TIBC 276, folate 6.69, B12 417.  Patient was receiving oral iron however was not iron deficient and was experiencing constipation, oral iron discontinued.    · Hemoglobin did decline during hospitalization 3/7 - 3/14/2023 from 13.2 on admission down into the 10-11 range.  Anemia evaluation performed on 3/12/2023 with iron 135, ferritin 295, iron saturation 51%, TIBC 264, folate 5.94, B12 236.  Hemoglobin was 10.5 at discharge.  · Hemoglobin on 3/15/2023 was 10.0.  Given the low normal B12 level on 3/12/2023 we will begin oral B12 1000 mcg daily.  Labs otherwise consistent with anemia secondary to chronic disease/CKD3 and will hopefully improve over time.  · Hemoglobin on most recent check 5/17/2023 was 12.4.    *Borderline B12 deficiency  · B12 level on 3/4/2022 was 238  · Patient initiated B12 1000 mcg IM injection daily x3 days and oral B12 1000 mcg daily  · Labs on 4/20/2022 with B12 811  · Patient was briefly receiving oral B12 replacement, not currently receiving B12.  · B12 level on 8/27/2022 was 417.  · B12 during hospitalization 3/12/2023 low normal at 236.  Initiated oral B12 1000 mcg  daily.     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  Spleen however was normal in size.  · LFTs normal on 3/2/2022  · CT abdomen and pelvis 3/8/2022 with liver morphology suspicious for chronic liver disease, spleen normal in size at 10.4 cm.  No other abnormalities.  · Significance of the liver appearance on scans is unclear.  · On 3/23/2022, elevated LFTs with ALT 46, AST 40, normal total bilirubin 0.4.  · CT abdomen and pelvis 3/9/2023 with continued evidence of hepatic steatosis and cirrhotic morphology of the liver, no splenomegaly.  · LFTs have remained mildly elevated    *GI prophylaxis  · Patient is currently receiving Pepcid 10 mg twice daily while on steroids    *Pulmonary nodule  · Patient underwent CT scan at [facility] [place] [facility] on 9/7/2021 that showed a 4 mm subpleural left lower lobe nodule  · Patient was scheduled for follow-up CT chest with thoracic surgery however declined to proceed with the scan.  Given her age and limited ability to treat a malignancy and with a high probability that this is a benign finding, scan was canceled.  · CT chest during hospitalization 8/7/2022 with no mention of pulmonary nodule.  · CT chest 3/12/2023 with no mention of pulmonary nodule.    *Bilateral calf DVT  · Patient was hospitalized 7/30 - 8/2/2022 due to COVID-19 infection/pneumonia.  She received dexamethasone alone.    · She was hospitalized again 8/5- 8/10/2022 due to symptoms of persistent weakness, fatigue, shortness of breath, cough,  congestion following her COVID-19 infection.  During that hospitalization, she underwent CT chest 8/7/2022 which showed no significant abnormal findings.  Bilateral lower extremity Doppler showed bilateral acute calf DVT.  Perfusion scan 8/8/2022 showed no evidence of pulmonary embolism.  Platelet count was in the 90-low 100,000 range and she was therefore anticoagulated initially with Lovenox and transitioned to Eliquis 5 mg twice daily.   · During hospitalization, doppler 10/17/2022 lower extremities showed evolution of her previous bilateral acute calf DVT to subacute on the right and chronic on the left.  Eliquis was held during hospital stay and was not resumed at time of discharge.  · Right upper extremity swelling prompted right upper extremity Doppler on 3/13/2023 which was negative during hospitalization.  · Patient currently remains off anticoagulation.  She is quite sedentary and is certainly at risk for progression of thrombosis.  Platelet count has remained in the 40-09612 range.  Anticoagulation would likely confer significant risk for bleeding in this situation.  There has been no clinical evidence of progressive or recurrent thrombosis.    *L5 compression fracture  · Patient hospitalized 8/24 - 8/29/2022 related to L5 compression fracture.    · CT scans showed compression fracture at L5.    · MRI lumbar spine 8/24/2022 redemonstrated L5 compression fracture.    · Conservative management of compression fracture was recommended by neurosurgery.  · Patient hospitalized with significant pain from L5 compression fracture 10/6 - 10/20/2022. CT scan on 10/16/2022 showed slightly more loss of height in the L5 fracture.  MRI of the lumbar spine on 10/17/2022 showed 20% loss of height at L5 with multiple fracture planes.  She was evaluated by neurosurgery and by pain management.  In light of her thrombocytopenia, it was felt that she was not a candidate for epidural injection.  · Hospitalization 3/7 -  3/14/2023.  She developed again worsening difficulty with back pain radiating to her bilateral lower extremities affecting her ability to ambulate.  MRI lumbar spine 3/7/2023 showed slight worsening of L5 compression fracture and further loss of height in the neural foramen at L5/S1 in addition to other degenerative changes producing foraminal narrowing.  Patient received IVIG to improve platelet count and facilitate epidural injection.  Platelet count increased up to 132,000 on 3/13/2023 at which time the epidural injection was performed. Patient was discharged to subacute nursing facility on 3/14/2023.  · Patient reports pain has improved after epidural injection but does persist.  Mobility improved for a period of time however has declined with recent hospitalizations.  Patient continues with home physical therapy.    *Electrolyte disturbances  · Patient with ongoing difficulty with hyponatremia, hyperkalemia. We are not managing her electrolytes.    Plan:  1. Continue prednisone 10 mg daily.  We are attempting to maintain platelet count above the 40-15031 range.  2. Continue oral B12 1000 mcg daily.  3. The patient currently remains off of anticoagulation with Eliquis.  Patient felt to have increased risk for bleeding complications on anticoagulation with platelet count in the current range  4. Continue with weekly CBC monitoring via home health lab draw with results to be faxed to our office.  5. Telephone visit to be scheduled in 8 weeks.    Patient did consent to telephone visit today    I did spend 11 minutes in video conversation today.  I did spend a total of 20 minutes in time caring for the patient today with additional time spent in review of records, video conversation, placement of orders, completion of documentation.

## 2023-05-23 ENCOUNTER — OFFICE VISIT (OUTPATIENT)
Dept: ONCOLOGY | Facility: CLINIC | Age: 88
End: 2023-05-23
Payer: MEDICARE

## 2023-05-23 DIAGNOSIS — D69.3 CHRONIC ITP (IDIOPATHIC THROMBOCYTOPENIA): Primary | ICD-10-CM

## 2023-05-23 PROCEDURE — 99442 PR PHYS/QHP TELEPHONE EVALUATION 11-20 MIN: CPT | Performed by: INTERNAL MEDICINE

## 2023-05-23 PROCEDURE — 1126F AMNT PAIN NOTED NONE PRSNT: CPT | Performed by: INTERNAL MEDICINE

## 2023-05-30 ENCOUNTER — OFFICE VISIT (OUTPATIENT)
Dept: CARDIOLOGY | Facility: CLINIC | Age: 88
End: 2023-05-30

## 2023-05-30 VITALS
HEIGHT: 57 IN | DIASTOLIC BLOOD PRESSURE: 88 MMHG | HEART RATE: 63 BPM | WEIGHT: 165 LBS | BODY MASS INDEX: 35.6 KG/M2 | SYSTOLIC BLOOD PRESSURE: 142 MMHG | OXYGEN SATURATION: 98 %

## 2023-05-30 DIAGNOSIS — I50.32 CHRONIC HEART FAILURE WITH PRESERVED EJECTION FRACTION: ICD-10-CM

## 2023-05-30 DIAGNOSIS — I35.0 NONRHEUMATIC AORTIC VALVE STENOSIS: Primary | ICD-10-CM

## 2023-05-30 DIAGNOSIS — I49.3 PVC (PREMATURE VENTRICULAR CONTRACTION): ICD-10-CM

## 2023-05-30 DIAGNOSIS — I34.0 NONRHEUMATIC MITRAL VALVE REGURGITATION: ICD-10-CM

## 2023-05-30 DIAGNOSIS — D69.6 THROMBOCYTOPENIA: ICD-10-CM

## 2023-05-30 DIAGNOSIS — H54.8 LEGALLY BLIND: ICD-10-CM

## 2023-05-30 DIAGNOSIS — I10 ESSENTIAL HYPERTENSION: ICD-10-CM

## 2023-05-30 DIAGNOSIS — N18.4 STAGE 4 CHRONIC KIDNEY DISEASE: ICD-10-CM

## 2023-05-30 DIAGNOSIS — I27.20 PULMONARY HYPERTENSION: ICD-10-CM

## 2023-05-30 RX ORDER — LEVOFLOXACIN 500 MG/1
500 TABLET, FILM COATED ORAL DAILY
Qty: 10 TABLET | Refills: 0 | COMMUNITY
Start: 2023-05-23 | End: 2023-06-02

## 2023-05-30 RX ORDER — BUDESONIDE AND FORMOTEROL FUMARATE DIHYDRATE 160; 4.5 UG/1; UG/1
2 AEROSOL RESPIRATORY (INHALATION) 2 TIMES DAILY
COMMUNITY
Start: 2023-04-18

## 2023-05-30 NOTE — PROGRESS NOTES
[facility] CARDIOLOGY  [address]  [facility] [address]  Phone: [phone]  Fax: [phone]      Patient Name: Helena Carmen  :[id number]  Age: 89 y.o.  Primary Cardiologist: Beth Butcher MD  Encounter Provider:  RIGO Olivera      Chief Complaint     Chief Complaint: Leg swelling    SUBJECTIVE     History of Present Illness:  Helena Carmen is a  89 y.o. 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  She is followed in our office by Dr. Butcher for aortic insufficiency and diastolic heart failure.     23 Follow-up:  She is here for hospital follow-up.  She was hospitalized late 2023 with possible UTI due to indwelling Hoffman catheter following her orthopedic surgery; she had urinary retention with recovery.  She had elevated lactic acid and procalcitonin with elevated WBC and left-sided shift.  She was diagnosed with bacteremia and urosepsis related to E. coli.  She was evaluated by urology and Hoffman catheter was left in place.  She was treated with broad-spectrum IV antibiotics and discharged home on Levaquin.  Her carvedilol was reduced to 3.125 mg twice daily due to bradycardia.  She was started on amlodipine 5 mg daily.  Her hydralazine is now 50 mg 3 times daily.  Her granddaughter was very concerned about being on amlodipine due to her history of swelling; Dr. Wick saw her and stopped amlodipine and increase hydralazine to 100 mg twice daily.  Her blood pressure at home has been running 130s to 140.  She is very frustrated at her weakness.  She is participating in physical therapy.  She notices that she gets nauseous with exertion.  This is especially true when she takes a shower; she has shower day on Monday, Wednesday, and Friday.  She denies chest pain or palpitations.  Her leg swelling is much better.    Below is a summary  of pertinent cardiology findings:  • She was originally referred for pericardial effusion noted on CT scan.  • Stress PET study in September 2010 showed no ischemia.  Echocardiogram showed EF 67%, mild concentric left ventricular hypertrophy, grade 1A diastolic dysfunction, moderate to marked left atrial enlargement, moderate aortic stenosis with peak gradient 42 mmHg and mean gradient 24 mmHg, mild mitral and tricuspid insufficiency, and small pericardial effusion.  • Echocardiogram March 2013 showed EF 58%, severe aortic stenosis with valvular area of 0.2 mm³ and peak gradient 71 mmHg with mean gradient 41 mmHg, moderate mitral insufficiency, severe left atrial enlargement, right ventricle systolic pressure 45 mmHg, and grade 2 diastolic dysfunction.  • Was referred to valve clinic and had cardiac catheterization after the March 2013 echocardiogram; cardiac cath showed minimal coronary artery disease.  On June 18, 2013 she had a 21 mm [place] Mitch trifecta bovine pericardial valve placed.  Post-operatively she had atrial fibrillation and some PVCs; Holter monitor showed no evidence of atrial fibrillation.  • 2D echocardiogram on August 1, 2003 showed EF 51%, mild to moderate left ventricular hypertrophy, grade 2 diastolic dysfunction, moderate to severe left atrial enlargement, mild to moderate mitral insufficiency, mild tricuspid insufficiency, and a tissue type aortic valve which was functioning well without stenosis or insufficiency.  This echo was done while she was hospitalized with C. difficile colitis and UTI.  • Echocardiogram on June 7, 2019 showed EF 56%, grade 2 diastolic dysfunction, mild LVH, left atrium moderately to severely dilated, moderate mitral insufficiency, moderate tricuspid insufficiency, RVSP at 69 mmHg, and normal functioning bioprosthetic aortic valve.  • Was seen for worsening dyspnea with exertion in December 2021.  Echocardiogram January 2022 showed LVEF 60%, diastolic dysfunction  consistent with age, bioprosthetic aortic valve present, peak and mean gradients elevated, and mild tricuspid insufficiency.  • She was admitted March 2 through March 9, 2022 with acute on chronic diastolic heart failure exacerbation and NADER: She was discharged home on 40 mg Lasix every other day and daily spironolactone.  She was discharged to [facility] and has been following with heart failure clinic.  • July 2022 echocardiogram showed EF 61 to 65%, mild concentric LV hypertrophy, grade 2 with high LAP LV diastolic dysfunction, moderately dilated left atrial cavity, bioprosthetic aortic valve present and functioning within defined limits, severe MAC with mild mitral valve regurgitation but no stenosis, mild to moderate tricuspid valve regurgitation, and RSVP 37 mmHg.  • She was hospitalized in August 2022 with COVID-pneumonia and also had bilateral DVTs; he was started on apixaban.  She then was hospitalized in October 2022 for L5 compression fracture which was treated with a brace.    • She then presented to [facility] on December 20, 2022 with volume overload and trouble swallowing.  She was treated with IV diuresis and had a swallow study; she is recommended to eat meals in an upright position.  She was discharged home on 40 mg Lasix daily.  There was concern about possible atrial fibrillation but EKGs all showed sinus rhythm with bigeminal PACs.    Past Medical History:   Diagnosis Date   • Aortic valve stenosis    • Back pain    • CKD (chronic kidney disease)    • Colitis due to Clostridioides difficile 01/26/2022   • Diastolic dysfunction    • Diverticulosis    • Exertional shortness of breath    • Heart disease    • Hiatal hernia    • Hyperlipidemia    • Hypertension    • Hyperthyroidism    • Hypertriglyceridemia 05/31/2018   • Hypothyroidism    • L5 compression fracture (HCC) 08/2022   • Left ventricular hypertrophy    • Legally blind    • Liver disease    • Low back pain     • Macular degeneration    • Mitral regurgitation    • Osteoarthritis of hip    • Osteoporosis    • Pancreatitis 2022   • Paroxysmal atrial fibrillation    • Peripheral neuropathy    • Premature ventricular contractions    • Pulmonary hypertension    • Renal insufficiency syndrome    • Type 2 diabetes mellitus    • Uterine cancer          Past Surgical History:   Procedure Laterality Date   • AORTIC VALVE REPAIR/REPLACEMENT     • CATARACT EXTRACTION      ,    • CHOLECYSTECTOMY     • ENDOSCOPY  08/15/2014    no gross lesions in stomach/duodenum, erythrematous mucosa in stomach   • EPIDURAL BLOCK     • HYSTERECTOMY     • STERNOTOMY           Social History     Socioeconomic History   • Marital status:    Tobacco Use   • Smoking status: Former     Packs/day: 0.50     Types: Cigarettes     Quit date: 7/10/1969     Years since quittin.9   • Smokeless tobacco: Never   Vaping Use   • Vaping Use: Never used   Substance and Sexual Activity   • Alcohol use: No     Comment: caffeine use - coffee 2 cups daily   • Drug use: No   • Sexual activity: Defer         Review of Systems     Review of Systems   Constitutional: Positive for malaise/fatigue.   Cardiovascular: Positive for dyspnea on exertion and leg swelling. Negative for chest pain, claudication, cyanosis, irregular heartbeat, near-syncope, orthopnea, palpitations, paroxysmal nocturnal dyspnea and syncope.   Musculoskeletal: Positive for muscle weakness.         Medications     Allergies as of 2023 - Reviewed 2023   Allergen Reaction Noted   • Baclofen Unknown - Low Severity 2023   • Erythromycin Unknown (See Comments) and Other (See Comments) 07/10/2014   • Furosemide Other (See Comments) and Unknown (See Comments) 2023   • Statins Myalgia 2022   • Cephalexin Other (See Comments) and Unknown (See Comments) 10/21/2014   • Penicillins Rash 2015   • Sulfa antibiotics Itching and Rash 2015          Current Outpatient Medications:   •  bismuth subsalicylate (PEPTO BISMOL) 262 MG/15ML suspension, Take 30 mL by mouth Every 6 (Six) Hours As Needed for Indigestion., Disp: , Rfl:   •  budesonide-formoterol (SYMBICORT) 160-4.5 MCG/ACT inhaler, Inhale 2 puffs 2 (Two) Times a Day., Disp: , Rfl:   •  calcium carbonate (TUMS) 500 MG chewable tablet, Chew 2 tablets 3 (Three) Times a Day As Needed for Indigestion or Heartburn., Disp: , Rfl:   •  carvedilol (COREG) 3.125 MG tablet, Take 1 tablet by mouth 2 (Two) Times a Day for 30 days., Disp: 60 tablet, Rfl: 0  •  cholecalciferol (VITAMIN D3) 25 MCG (1000 UT) tablet, Take 1 tablet by mouth Daily., Disp: , Rfl:   •  famotidine (PEPCID) 40 MG tablet, Take 1 tablet by mouth Every Night., Disp: 30 tablet, Rfl: 0  •  fluticasone-salmeterol (Advair HFA) 115-21 MCG/ACT inhaler, Inhale 2 puffs 2 (Two) Times a Day., Disp: 12 each, Rfl: 11  •  furosemide (LASIX) 40 MG tablet, Take 1 tablet by mouth Daily., Disp: , Rfl:   •  gabapentin (NEURONTIN) 600 MG tablet, Take 1 tablet by mouth 2 (Two) Times a Day. (Patient taking differently: Take 300 mg by mouth 2 (Two) Times a Day.), Disp: 6 tablet, Rfl: 0  •  hydrALAZINE (APRESOLINE) 100 MG tablet, Take 1 tablet by mouth 2 (Two) Times a Day., Disp: 60 tablet, Rfl: 11  •  HYDROcodone-acetaminophen (NORCO) 7.5-325 MG per tablet, Take 1 tablet by mouth Every 6 (Six) Hours., Disp: 12 tablet, Rfl: 0  •  insulin glargine (LANTUS, SEMGLEE) 100 UNIT/ML injection, Inject 20 Units under the skin into the appropriate area as directed Every Night., Disp: , Rfl:   •  insulin lispro (ADMELOG) 100 UNIT/ML injection, Inject 0-9 Units under the skin into the appropriate area as directed 3 (Three) Times a Day Before Meals., Disp: , Rfl: 12  •  levoFLOXacin (LEVAQUIN) 500 MG tablet, Take 1 tablet by mouth Daily., Disp: 10 tablet, Rfl: 0  •  levothyroxine (SYNTHROID, LEVOTHROID) 50 MCG tablet, Take 1 tablet by mouth Every Morning., Disp: , Rfl:   •   "lidocaine (LIDODERM) 5 %, Place 1 patch on the skin as directed by provider Daily. Remove & Discard patch within 12 hours or as directed by MD, Disp: 6 each, Rfl: 0  •  LORazepam (ATIVAN) 0.5 MG tablet, Take 1 tablet by mouth Every 8 (Eight) Hours As Needed for Anxiety. (Patient taking differently: Take 2 tablets by mouth Every 8 (Eight) Hours As Needed for Anxiety.), Disp: 9 tablet, Rfl: 0  •  magnesium oxide (MAG-OX) 400 MG tablet, Take 0.5 tablets by mouth Daily., Disp: 45 tablet, Rfl: 3  •  montelukast (SINGULAIR) 10 MG tablet, Take 1 tablet by mouth Every Night., Disp: , Rfl:   •  ondansetron (ZOFRAN) 8 MG tablet, Take 1 tablet by mouth As Needed., Disp: , Rfl:   •  polyethylene glycol (MIRALAX) 17 GM/SCOOP powder, Take 17 g by mouth Daily., Disp: , Rfl:   •  tamsulosin (FLOMAX) 0.4 MG capsule 24 hr capsule, Take 1 capsule by mouth every night at bedtime., Disp: , Rfl:         OBJECTIVE     Vital Signs:   /88   Pulse 63   Ht 144.8 cm (57\")   Wt 74.8 kg (165 lb)   SpO2 98%   BMI 35.71 kg/m²       Weight:  Wt Readings from Last 3 Encounters:   05/30/23 74.8 kg (165 lb)   05/10/23 78 kg (172 lb)   04/30/23 81 kg (178 lb 9.2 oz)     Body mass index is 35.71 kg/m².        Physical Exam     Physical Exam  Constitutional:       General: She is not in acute distress.  HENT:      Head: Normocephalic and atraumatic.      Mouth/Throat:      Mouth: Mucous membranes are moist.   Eyes:      General: No scleral icterus.     Extraocular Movements: Extraocular movements intact.      Conjunctiva/sclera: Conjunctivae normal.      Pupils: Pupils are equal, round, and reactive to light.   Cardiovascular:      Rate and Rhythm: Normal rate and regular rhythm.      Pulses: Normal pulses.      Heart sounds: S1 normal and S2 normal. Murmur heard.   Pulmonary:      Effort: No respiratory distress.      Breath sounds: Normal breath sounds. No wheezing, rhonchi or rales.   Abdominal:      General: Bowel sounds are normal. There " is no distension.      Palpations: Abdomen is soft.      Tenderness: There is no abdominal tenderness.   Musculoskeletal:         General: Normal range of motion.      Cervical back: Normal range of motion and neck supple.      Right lower le+ Pitting Edema present.      Left lower le+ Pitting Edema present.      Comments: In wheelchair   Skin:     General: Skin is warm and dry.      Coloration: Skin is not jaundiced.   Neurological:      Mental Status: She is alert and oriented to person, place, and time.   Psychiatric:         Mood and Affect: Mood normal.         Reviewed Data     Result Review :  The following data was reviewed by RIGO Olivera on 23:  • Labs on Tosin 15, 2022 showed creatinine 1.5, sodium 136, potassium 4.3, hemoglobin 11.9, platelet count 92.  · Lipid panel in 2022 showed total cholesterol 101, HDL 41, LDL 38, and triglycerides 124.  · Labs 2022: cr 1.9, K 4.1, otherwise unremarkable BMP  · 2023: cr 1.7, K 3.8, , otherwise unremarkable CMP, Hgb 12.8, PLT 42  ·  05/10/2023: cr 1.8, K 4.7, otherwise unremarkable CMP, Hgb 11.4,         ECG 12 Lead    Date/Time: 2023 1:32 PM  Performed by: Sima Blum APRN  Authorized by: Sima Blum APRN   Comparison: compared with previous ECG from 2022  Similar to previous ECG  Rhythm: sinus rhythm  Rate: normal  BPM: 66  Conduction: incomplete left bundle branch block  T inversion: III, aVF, V4, V5 and V6    Clinical impression: abnormal EKG            Assessment and Plan        Assessment and Plan     Assessment:  1. Nonrheumatic aortic valve stenosis    2. Nonrheumatic mitral valve regurgitation    3. Chronic heart failure with preserved ejection fraction    4. Pulmonary hypertension    5. PVC (premature ventricular contraction)    6. Essential hypertension    7. Stage 4 chronic kidney disease    8. Thrombocytopenia    9. Legally blind         1. Aortic  valve stenosis: June 18, 2013 she had 21 mm Saint Mitch trifecta bovine pericardial valve placed.  Echocardiogram January 2022 showed normal functioning bioprosthetic aortic valve but with mean and peak gradients elevated.  Valve was grossly normal on July 2022 echocardiogram. She appears compensated by exam today, but does report nausea with exertion.  2. Mitral valve insufficiency: Severe MAC noted on July 2022 echocardiogram with mild regurgitation but no stenosis.  Leg swelling is very good.  3. Chronic diastolic heart failure: Left ventricular diastolic function noted to be consistent with age on echocardiogram January 2022 echocardiogram showed grade 2 with high LAP LV diastolic dysfunction.  She was admitted in December 2022 with acute on chronic HFpEF.  She required IV diuresis and was discharged home on 40 mg Lasix.  Leg swelling is better off amlodipine.  Of note she was not able to tolerate Jardiance or Farxiga due to UTI.  Spironolactone was also stopped due to NADER.  She is compensated by exam.  4. Pulmonary hypertension: Severe on echocardiogram from June 2019, but mild on July 2022 echocardiogram.  Her blood pressure is controlled in office though I am concerned it is overcontrolled.  5. PVCs: These are chronic and stable she is on 12.5 mg metoprolol tartrate twice daily.  No ectopy noted on EKG today.  6. Hypertension: She reports nausea with exertion; especially with shower.  I am concerned her blood pressure is overcontrolled.  7. CKD 3B: Renal function was stable when checked in May 2023..  She follows with Dr. Art Wick.  She had NADER with spironolactone.  8. Thrombocytopenia: Platelet count is trending down again.  She has followed with hematology.  9. 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      Plan:  Ms. Carmen is a patient of Dr. Ramirez with chronic HFpEF and aortic valve insufficiency status post aortic valve replacement.  Her last echocardiogram in July 2022 shows grossly normal  functioning bioprosthetic aortic valve.  I am going to reduce her hydralazine to 50 mg every morning and nightly by 100 mg at night.  She can take another 50 mg during the day if blood pressures greater than 160 mmHg.  Her new BP goal may need to be 140-150; I will see her back in 2 months.    Follow Up:  Return in about 2 months (around 7/30/2023) for Follow-up with RIGO Rodney.  Orders Placed This Encounter   Procedures   • ECG 12 Lead          I appreciate the opportunity to participate in this patient's care.    Thank you,  RIGO Rodney

## 2023-05-31 ENCOUNTER — READMISSION MANAGEMENT (OUTPATIENT)
Dept: CALL CENTER | Facility: HOSPITAL | Age: 88
End: 2023-05-31

## 2023-05-31 NOTE — OUTREACH NOTE
Medical Week 3 Survey    Flowsheet Row Responses   [facility] patient discharged from? [place]   Does the patient have one of the following disease processes/diagnoses(primary or secondary)? Other   Week 3 attempt successful? Yes   Call start time 1739   Call end time 1742   Person spoke with today (if not patient) and relationship Patient   Meds reviewed with patient/caregiver? Yes   Does the patient have all medications ordered at discharge? Yes   Is the patient taking all medications as directed (includes completed medication regime)? Yes   Does the patient have a primary care provider?  Yes   Has the patient kept scheduled appointments due by today? Yes   What is the Home health agency?  [facility]   Has home health visited the patient within 72 hours of discharge? Yes   Psychosocial issues? No   Psychosocial comments Patient reports has someone with her 24/7   What is the patient's perception of their health status since discharge? Improving   Is the patient/caregiver able to teach back the hierarchy of who to call/visit for symptoms/problems? PCP, Specialist, Home health nurse, Urgent Care, ED, 911 Yes   If the patient is a current smoker, are they able to teach back resources for cessation? Not a smoker   Week 3 Call Completed? Yes   Graduated Yes  [No further calls post week 3]   Is the patient interested in additional calls from an ambulatory ?  NOTE:  applies to high risk patients requiring additional follow-up. No          ANEL JUNG - Registered Nurse

## 2023-06-12 ENCOUNTER — HOSPITAL ENCOUNTER (EMERGENCY)
Facility: HOSPITAL | Age: 88
Discharge: HOME | End: 2023-06-12
Attending: EMERGENCY MEDICINE | Admitting: EMERGENCY MEDICINE
Payer: MEDICARE

## 2023-06-12 ENCOUNTER — APPOINTMENT (OUTPATIENT)
Dept: GENERAL RADIOLOGY | Facility: HOSPITAL | Age: 88
End: 2023-06-12
Payer: MEDICARE

## 2023-06-12 VITALS
OXYGEN SATURATION: 95 % | SYSTOLIC BLOOD PRESSURE: 180 MMHG | RESPIRATION RATE: 16 BRPM | DIASTOLIC BLOOD PRESSURE: 75 MMHG | HEIGHT: 57 IN | WEIGHT: 165 LBS | TEMPERATURE: 98.2 F | HEART RATE: 77 BPM | BODY MASS INDEX: 35.6 KG/M2

## 2023-06-12 DIAGNOSIS — Z86.79 HISTORY OF PULMONARY HYPERTENSION: ICD-10-CM

## 2023-06-12 DIAGNOSIS — D69.6 THROMBOCYTOPENIA: ICD-10-CM

## 2023-06-12 DIAGNOSIS — E86.0 MILD DEHYDRATION: Primary | ICD-10-CM

## 2023-06-12 DIAGNOSIS — R53.1 GENERALIZED WEAKNESS: ICD-10-CM

## 2023-06-12 DIAGNOSIS — Z86.39 HISTORY OF DIABETES MELLITUS: ICD-10-CM

## 2023-06-12 DIAGNOSIS — N18.9 CHRONIC RENAL IMPAIRMENT, UNSPECIFIED CKD STAGE: ICD-10-CM

## 2023-06-12 LAB
ALBUMIN SERPL-MCNC: 3.6 G/DL (ref 3.5–5.2)
ALBUMIN/GLOB SERPL: 1.2 G/DL
ALP SERPL-CCNC: 55 U/L (ref 39–117)
ALT SERPL W P-5'-P-CCNC: 46 U/L (ref 1–33)
ANION GAP SERPL CALCULATED.3IONS-SCNC: 11 MMOL/L (ref 5–15)
AST SERPL-CCNC: 58 U/L (ref 1–32)
BACTERIA UR QL AUTO: ABNORMAL /HPF
BASOPHILS # BLD AUTO: 0.02 10*3/MM3 (ref 0–0.2)
BASOPHILS NFR BLD AUTO: 0.1 % (ref 0–1.5)
BILIRUB SERPL-MCNC: 0.7 MG/DL (ref 0–1.2)
BILIRUB UR QL STRIP: NEGATIVE
BUN SERPL-MCNC: 28 MG/DL (ref 8–23)
BUN/CREAT SERPL: 17.1 (ref 7–25)
CALCIUM SPEC-SCNC: 7.9 MG/DL (ref 8.2–9.6)
CHLORIDE SERPL-SCNC: 94 MMOL/L (ref 98–107)
CLARITY UR: ABNORMAL
CO2 SERPL-SCNC: 24 MMOL/L (ref 22–29)
COLOR UR: YELLOW
CREAT SERPL-MCNC: 1.64 MG/DL (ref 0.57–1)
D-LACTATE SERPL-SCNC: 1.7 MMOL/L (ref 0.5–2)
DEPRECATED RDW RBC AUTO: 49.4 FL (ref 37–54)
EGFRCR SERPLBLD CKD-EPI 2021: 29.3 ML/MIN/1.73
EOSINOPHIL # BLD AUTO: 0 10*3/MM3 (ref 0–0.4)
EOSINOPHIL NFR BLD AUTO: 0 % (ref 0.3–6.2)
ERYTHROCYTE [DISTWIDTH] IN BLOOD BY AUTOMATED COUNT: 15.8 % (ref 12.3–15.4)
GLOBULIN UR ELPH-MCNC: 2.9 GM/DL
GLUCOSE SERPL-MCNC: 272 MG/DL (ref 65–99)
GLUCOSE UR STRIP-MCNC: ABNORMAL MG/DL
HCT VFR BLD AUTO: 39.3 % (ref 34–46.6)
HGB BLD-MCNC: 12.6 G/DL (ref 12–15.9)
HGB UR QL STRIP.AUTO: ABNORMAL
HYALINE CASTS UR QL AUTO: ABNORMAL /LPF
KETONES UR QL STRIP: ABNORMAL
LEUKOCYTE ESTERASE UR QL STRIP.AUTO: ABNORMAL
LIPASE SERPL-CCNC: 21 U/L (ref 13–60)
LYMPHOCYTES # BLD AUTO: 1.02 10*3/MM3 (ref 0.7–3.1)
LYMPHOCYTES NFR BLD AUTO: 6.4 % (ref 19.6–45.3)
MAGNESIUM SERPL-MCNC: 2.5 MG/DL (ref 1.7–2.3)
MCH RBC QN AUTO: 27.4 PG (ref 26.6–33)
MCHC RBC AUTO-ENTMCNC: 32.1 G/DL (ref 31.5–35.7)
MCV RBC AUTO: 85.4 FL (ref 79–97)
MONOCYTES # BLD AUTO: 0.61 10*3/MM3 (ref 0.1–0.9)
MONOCYTES NFR BLD AUTO: 3.9 % (ref 5–12)
NEUTROPHILS NFR BLD AUTO: 14.08 10*3/MM3 (ref 1.7–7)
NEUTROPHILS NFR BLD AUTO: 89 % (ref 42.7–76)
NITRITE UR QL STRIP: NEGATIVE
PH UR STRIP.AUTO: 6.5 [PH] (ref 5–8)
PLATELET # BLD AUTO: 38 10*3/MM3 (ref 140–450)
PMV BLD AUTO: 11 FL (ref 6–12)
POTASSIUM SERPL-SCNC: 5.7 MMOL/L (ref 3.5–5.2)
PROT SERPL-MCNC: 6.5 G/DL (ref 6–8.5)
PROT UR QL STRIP: ABNORMAL
RBC # BLD AUTO: 4.6 10*6/MM3 (ref 3.77–5.28)
RBC # UR STRIP: ABNORMAL /HPF
REF LAB TEST METHOD: ABNORMAL
SODIUM SERPL-SCNC: 129 MMOL/L (ref 136–145)
SP GR UR STRIP: 1.02 (ref 1–1.03)
SQUAMOUS #/AREA URNS HPF: ABNORMAL /HPF
UROBILINOGEN UR QL STRIP: ABNORMAL
WBC # UR STRIP: ABNORMAL /HPF
WBC NRBC COR # BLD: 15.82 10*3/MM3 (ref 3.4–10.8)
YEAST URNS QL MICRO: ABNORMAL /HPF

## 2023-06-12 PROCEDURE — 80053 COMPREHEN METABOLIC PANEL: CPT | Performed by: EMERGENCY MEDICINE

## 2023-06-12 PROCEDURE — 83735 ASSAY OF MAGNESIUM: CPT | Performed by: EMERGENCY MEDICINE

## 2023-06-12 PROCEDURE — 83605 ASSAY OF LACTIC ACID: CPT | Performed by: EMERGENCY MEDICINE

## 2023-06-12 PROCEDURE — 99284 EMERGENCY DEPT VISIT MOD MDM: CPT

## 2023-06-12 PROCEDURE — P9612 CATHETERIZE FOR URINE SPEC: HCPCS

## 2023-06-12 PROCEDURE — 83690 ASSAY OF LIPASE: CPT | Performed by: EMERGENCY MEDICINE

## 2023-06-12 PROCEDURE — 81001 URINALYSIS AUTO W/SCOPE: CPT | Performed by: EMERGENCY MEDICINE

## 2023-06-12 PROCEDURE — 71045 X-RAY EXAM CHEST 1 VIEW: CPT

## 2023-06-12 PROCEDURE — 85025 COMPLETE CBC W/AUTO DIFF WBC: CPT | Performed by: EMERGENCY MEDICINE

## 2023-06-12 RX ADMIN — SODIUM CHLORIDE 1000 ML: 9 INJECTION, SOLUTION INTRAVENOUS at 11:55

## 2023-06-12 NOTE — ED TRIAGE NOTES
From home with c/o fall this morning, was assisted to floor by daughter.  no injuries.  Has had generalized weakness x 2 days; nausea x 2 months.  Dx with UTI last month.

## 2023-06-12 NOTE — DISCHARGE INSTRUCTIONS
Stay well-hydrated, continue current medications, close outpatient follow-up with PCP and specialist as discussed, ED return for worsening symptoms as needed.

## 2023-06-12 NOTE — ED PROVIDER NOTES
EMERGENCY DEPARTMENT ENCOUNTER    Room Number:  17/17  Date of encounter:  6/12/2023  PCP: Mariah Hickman MD  Historian: Patient, family      HPI:  Chief Complaint: Weakness  A complete HPI/ROS/PMH/PSH/SH/FH are unobtainable due to: None    Context: Helena Carmen is a 89 y.o. female who presents to the ED via [facility] from home this morning after a episode of severe weakness where she was assisted to the floor by her daughter.  Patient states that she got out of bed and was using her walker and just felt really weak.  Got clammy and flushed.  Has had increasing weakness over last couple days.  Nausea for couple months.  Just finished up a week of Levaquin for an upper respiratory infection.  Had a UTI about a month ago, urine was dark yesterday but clearing up today.  No vomiting or diarrhea.  Has an indwelling Hoffman catheter.  No measured fevers at home.  Has had some recent mild cough but denies any overt shortness of breath.  Family noted elevated blood glucose level of 243 today which is high for her in the mornings.      MEDICAL RECORD REVIEW    External (non-ED) record review: Adult transthoracic echo May 4, 2023 shows ejection fraction of 68.2% with a bioprosthetic aortic valve present, moderate to severe pulmonary hypertension noted    PAST MEDICAL HISTORY  Active Ambulatory Problems     Diagnosis Date Noted    Aortic valve stenosis 04/05/2016    Fatigue 04/05/2016    MI (mitral incompetence) 04/05/2016    PVC (premature ventricular contraction) 04/05/2016    Legally blind 05/25/2018    Essential hypertension 05/25/2018    Hypertriglyceridemia 05/31/2018    Pulmonary hypertension 06/25/2020    Paroxysmal atrial fibrillation 06/25/2020    Anxiety 07/10/2014    Stage 4 chronic kidney disease 03/20/2015    Chronic pain disorder 01/26/2022    Degeneration of lumbar intervertebral disc 09/09/2014    Type 2 diabetes mellitus with hyperglycemia 01/26/2022    Esophageal reflux 07/10/2014    Gastroparesis  01/26/2022    Hiatal hernia 01/26/2022    Iatrogenic hypothyroidism 07/08/2014    Macular degeneration 01/26/2022    Malignant neoplasm of uterus 01/26/2022    Osteoarthritis of knee 07/10/2014    Peripheral nerve disease 07/10/2014    Secondary hyperparathyroidism 04/05/2016    Thrombocytopenia 07/11/2014    Chronic heart failure with preserved ejection fraction 03/02/2022    Other cirrhosis of liver 03/03/2022    Hypothyroidism 02/22/2022    Nonrheumatic tricuspid valve regurgitation 02/22/2022    Anemia, chronic disease 07/30/2022    Hyponatremia 07/30/2022    Closed fracture of fifth lumbar vertebra 08/24/2022    Closed fracture of fifth lumbar vertebra, unspecified fracture morphology, initial encounter 08/25/2022    Diabetic polyneuropathy associated with type 2 diabetes mellitus 08/26/2022    Chronic ITP (idiopathic thrombocytopenia) 08/28/2022    Chronic midline low back pain with bilateral sciatica 10/16/2022    Acute deep vein thrombosis of calf, bilateral 10/17/2022    Compression fracture of L5 vertebra with routine healing 10/17/2022    Acute left-sided low back pain with left-sided sciatica 03/07/2023    Urine retention 03/10/2023    Rib fracture 03/14/2023    Acute on chronic combined systolic and diastolic congestive heart failure 04/04/2023    Acute UTI 04/30/2023    Blindness right eye category 3, blindness left eye category 3 03/09/2022    Cognitive communication deficit 03/09/2022    Pseudomonas (aeruginosa) (mallei) (pseudomallei) as the cause of diseases classified elsewhere 06/09/2022     Resolved Ambulatory Problems     Diagnosis Date Noted    Diastolic dysfunction 04/05/2016    Hypertensive pulmonary vascular disease (HCC) 04/05/2016    Bioprosthetic aortic valve replacement 05/10/2017    Breast screening 07/08/2014    Abdominal pain, right lower quadrant 12/13/2020    Allergic rhinitis 07/10/2014    Angina pectoris 07/10/2014    Pancreatitis 01/26/2022    Colitis due to Clostridioides  difficile 01/26/2022    Hypothyroidism 01/26/2022    Long term (current) use of opiate analgesic 01/26/2022    Uncontrolled type 2 diabetes mellitus 07/08/2014    Benign essential hypertension 07/08/2014    Hypertension 01/26/2022    Pulmonary hypertension 04/05/2016    Hypercholesterolemia 01/26/2022    Hyperlipidemia 07/08/2014    Legal blindness 05/25/2018    Mitral valve disorder 07/10/2014    Mitral valve regurgitation 04/05/2016    Ventricular premature beats 04/05/2016    History of aortic valve replacement 05/10/2017    Nonrheumatic tricuspid valve regurgitation     Elevated serum creatinine     CHF (congestive heart failure) 03/02/2022    Hypertensive disorder 02/22/2022    Pancreatitis 02/22/2022    Sepsis 06/05/2022    COVID-19 07/30/2022    Weakness generalized 07/30/2022    Leukopenia 08/02/2022    Cytokine release syndrome, grade 1 08/02/2022    Urine retention 08/02/2022    COVID-19 virus infection 08/06/2022    Acute DVT (deep venous thrombosis) 08/07/2022    Acute diastolic congestive heart failure 12/20/2022    Esophageal dysphagia 12/24/2022    Stage 3b chronic kidney disease 03/20/2015    Acute on chronic diastolic (congestive) heart failure 03/09/2022     Past Medical History:   Diagnosis Date    Back pain     CKD (chronic kidney disease)     Diverticulosis     Exertional shortness of breath     Heart disease     Hyperthyroidism     L5 compression fracture (HCC) 08/2022    Left ventricular hypertrophy     Liver disease     Low back pain     Mitral regurgitation     Osteoarthritis of hip     Osteoporosis     Peripheral neuropathy     Premature ventricular contractions     Renal insufficiency syndrome     Type 2 diabetes mellitus     Uterine cancer          PAST SURGICAL HISTORY  Past Surgical History:   Procedure Laterality Date    AORTIC VALVE REPAIR/REPLACEMENT      CATARACT EXTRACTION      1970, 1999    CHOLECYSTECTOMY      ENDOSCOPY  08/15/2014    no gross lesions in stomach/duodenum,  erythrematous mucosa in stomach    EPIDURAL BLOCK      HYSTERECTOMY  2007    STERNOTOMY           FAMILY HISTORY  Family History   Problem Relation Age of Onset    Heart disease Mother     Hypertension Mother     Stroke Mother     Diabetes Mother         mellitus    Other Other         cardiovascular disorder         SOCIAL HISTORY  Social History     Socioeconomic History    Marital status:    Tobacco Use    Smoking status: Former     Packs/day: 0.50     Types: Cigarettes     Quit date: 7/10/1969     Years since quittin.9    Smokeless tobacco: Never   Vaping Use    Vaping Use: Never used   Substance and Sexual Activity    Alcohol use: No     Comment: caffeine use - coffee 2 cups daily    Drug use: No    Sexual activity: Defer         ALLERGIES  Baclofen, Erythromycin, Furosemide, Statins, Cephalexin, Penicillins, and Sulfa antibiotics        REVIEW OF SYSTEMS  Review of Systems     All systems reviewed and negative except for those discussed in HPI.       PHYSICAL EXAM    I have reviewed the triage vital signs and nursing notes.    ED Triage Vitals [23 0910]   Temp Heart Rate Resp BP SpO2   98.2 °F (36.8 °C) 79 16 149/43 95 %      Temp src Heart Rate Source Patient Position BP Location FiO2 (%)   -- -- -- -- --       Physical Exam  General: No acute distress, nontoxic, nondiaphoretic  HEENT: Mucous membranes tacky, atraumatic, EOMI  Neck: Full ROM  Pulm: Symmetric chest rise, nonlabored, lungs CTAB  Cardiovascular: Regular rate and rhythm, intact distal pulses, no peripheral edema  GI: Soft, nontender, nondistended, no rebound, no guarding, bowel sounds present  : Indwelling Hoffman catheter present  MSK: Full ROM, no deformity  Skin: Warm, dry  Neuro: Awake, alert, oriented x 4, GCS 15, moving all extremities, no focal deficits  Psych: Calm, cooperative        LAB RESULTS  Recent Results (from the past 24 hour(s))   Magnesium    Collection Time: 23 10:56 AM    Specimen: Blood   Result Value  Ref Range    Magnesium 2.5 (H) 1.7 - 2.3 mg/dL   Comprehensive Metabolic Panel    Collection Time: 06/12/23 10:56 AM    Specimen: Blood   Result Value Ref Range    Glucose 272 (H) 65 - 99 mg/dL    BUN 28 (H) 8 - 23 mg/dL    Creatinine 1.64 (H) 0.57 - 1.00 mg/dL    Sodium 129 (L) 136 - 145 mmol/L    Potassium 5.7 (H) 3.5 - 5.2 mmol/L    Chloride 94 (L) 98 - 107 mmol/L    CO2 24.0 22.0 - 29.0 mmol/L    Calcium 7.9 (L) 8.2 - 9.6 mg/dL    Total Protein 6.5 6.0 - 8.5 g/dL    Albumin 3.6 3.5 - 5.2 g/dL    ALT (SGPT) 46 (H) 1 - 33 U/L    AST (SGOT) 58 (H) 1 - 32 U/L    Alkaline Phosphatase 55 39 - 117 U/L    Total Bilirubin 0.7 0.0 - 1.2 mg/dL    Globulin 2.9 gm/dL    A/G Ratio 1.2 g/dL    BUN/Creatinine Ratio 17.1 7.0 - 25.0    Anion Gap 11.0 5.0 - 15.0 mmol/L    eGFR 29.3 (L) >60.0 mL/min/1.73   Lipase    Collection Time: 06/12/23 10:56 AM    Specimen: Blood   Result Value Ref Range    Lipase 21 13 - 60 U/L   Lactic Acid, Plasma    Collection Time: 06/12/23 10:56 AM    Specimen: Blood   Result Value Ref Range    Lactate 1.7 0.5 - 2.0 mmol/L   CBC Auto Differential    Collection Time: 06/12/23 10:56 AM    Specimen: Blood   Result Value Ref Range    WBC 15.82 (H) 3.40 - 10.80 10*3/mm3    RBC 4.60 3.77 - 5.28 10*6/mm3    Hemoglobin 12.6 12.0 - 15.9 g/dL    Hematocrit 39.3 34.0 - 46.6 %    MCV 85.4 79.0 - 97.0 fL    MCH 27.4 26.6 - 33.0 pg    MCHC 32.1 31.5 - 35.7 g/dL    RDW 15.8 (H) 12.3 - 15.4 %    RDW-SD 49.4 37.0 - 54.0 fl    MPV 11.0 6.0 - 12.0 fL    Platelets 38 (C) 140 - 450 10*3/mm3    Neutrophil % 89.0 (H) 42.7 - 76.0 %    Lymphocyte % 6.4 (L) 19.6 - 45.3 %    Monocyte % 3.9 (L) 5.0 - 12.0 %    Eosinophil % 0.0 (L) 0.3 - 6.2 %    Basophil % 0.1 0.0 - 1.5 %    Neutrophils, Absolute 14.08 (H) 1.70 - 7.00 10*3/mm3    Lymphocytes, Absolute 1.02 0.70 - 3.10 10*3/mm3    Monocytes, Absolute 0.61 0.10 - 0.90 10*3/mm3    Eosinophils, Absolute 0.00 0.00 - 0.40 10*3/mm3    Basophils, Absolute 0.02 0.00 - 0.20 10*3/mm3    Urinalysis With Culture If Indicated - Indwelling Urethral Catheter    Collection Time: 06/12/23 10:59 AM    Specimen: Indwelling Urethral Catheter; Urine   Result Value Ref Range    Color, UA Yellow Yellow, Straw    Appearance, UA Cloudy (A) Clear    pH, UA 6.5 5.0 - 8.0    Specific Gravity, UA 1.016 1.005 - 1.030    Glucose,  mg/dL (Trace) (A) Negative    Ketones, UA Trace (A) Negative    Bilirubin, UA Negative Negative    Blood, UA Small (1+) (A) Negative    Protein,  mg/dL (2+) (A) Negative    Leuk Esterase, UA Large (3+) (A) Negative    Nitrite, UA Negative Negative    Urobilinogen, UA 1.0 E.U./dL 0.2 - 1.0 E.U./dL   Urinalysis, Microscopic Only - Indwelling Urethral Catheter    Collection Time: 06/12/23 10:59 AM    Specimen: Indwelling Urethral Catheter; Urine   Result Value Ref Range    RBC, UA 0-2 None Seen, 0-2 /HPF    WBC, UA 3-5 (A) None Seen, 0-2 /HPF    Bacteria, UA None Seen None Seen /HPF    Squamous Epithelial Cells, UA 0-2 None Seen, 0-2 /HPF    Yeast, UA Large/3+ Budding Yeast None Seen /HPF    Hyaline Casts, UA None Seen None Seen /LPF    Methodology Manual Light Microscopy        Ordered the above labs and independently interpreted results. My findings will be discussed in the medical decision making section below        RADIOLOGY  XR Chest 1 View    Result Date: 6/12/2023  XR CHEST 1 VW-  Clinical: Cough and congestion  COMPARISON 04/30/2023  FINDINGS: There are median sternotomy wires in position as before. The cardiomediastinal silhouette is stable. No pleural effusion, vascular congestion or acute airspace disease has developed.  CONCLUSION: Stable chest  This report was finalized on 6/12/2023 11:37 AM by Dr. Art Fabian M.D.       Ordered the above noted radiological studies.  Independently interpreted by me and my independent review of findings can be found in the ED Course.  See dictation for official radiology interpretation.      PROCEDURES    Procedures      MEDICATIONS  GIVEN IN ER    Medications   sodium chloride 0.9 % bolus 1,000 mL (0 mL Intravenous Stopped 6/12/23 1302)         PROGRESS, DATA ANALYSIS, CONSULTS, AND MEDICAL DECISION MAKING    Please note that this section constitutes my independent interpretation of clinical data including lab results, radiology, EKG's.  This constitutes my independent professional opinion regarding differential diagnosis and management of this patient.  It may include any factors such as history from outside sources, review of external records, social determinants of health, management of medications, response to those treatments, and discussions with other providers.    Differential Diagnosis and Plan: Initial concern for dehydration, renal failure, electrolyte abnormalities, UTI, pneumonia, anemia, among others.  Plan for labs, chest x-ray, IV fluids, and reevaluation with results.    Additional sources:  - Discussed/ obtained information from independent historians:  Family family at bedside states that she was assisted to the ground, it was not a fall, no head injury or LOC.     - Chronic or social conditions impacting care:      - Shared decision making:  Patient and family at bedside fully updated on and in agreement with the course and plan moving forward    ED Course as of 06/12/23 1836   Mon Jun 12, 2023   1113 Nitrite, UA: Negative [DC]   1113 Leukocytes, UA(!): Large (3+) [DC]   1113 Ketones, UA(!): Trace [DC]   1128 WBC(!): 15.82 [DC]   1128 Hemoglobin: 12.6 [DC]   1128 Platelets(!!): 38  Chronic thrombocytopenia, slightly decreased from prior [DC]   1136 Glucose(!): 272 [DC]   1136 BUN(!): 28 [DC]   1136 Creatinine(!): 1.64  1.76 one month ago [DC]   1136 Sodium(!): 129 [DC]   1136 Potassium(!): 5.7 [DC]   1136 Calcium(!): 7.9 [DC]   1136 ALT (SGPT)(!): 46 [DC]   1136 AST (SGOT)(!): 58 [DC]   1136 Alkaline Phosphatase: 55 [DC]   1136 Total Bilirubin: 0.7 [DC]   1136 Magnesium(!): 2.5 [DC]   1136 Lipase: 21 [DC]   1136 Lactate: 1.7  [DC]   1152 Bacteria, UA: None Seen [DC]   1152 WBC, UA(!): 3-5 [DC]   1152 RBC, UA: 0-2 [DC]   1208 XR Chest 1 View  Per my independent interpretation of the chest x-ray, no evidence of pneumothorax [DC]   1208 XR Chest 1 View  Radiology report reviewed, no evidence of any acute emergent findings [DC]   1225 Discussed findings today with patient and family, at this point I suspect that the patient can discharge home with improved hydration and outpatient follow-up.  No evidence of any acute emergent process.  All questions and concerns addressed.  Patient and family are comfortable with plan for discharge.  ED return for worsening symptoms as needed.   [DC]      ED Course User Index  [DC] Carloz Tee MD       Hospitalization Considered?:     Orders Placed During This Visit:  Orders Placed This Encounter   Procedures    XR Chest 1 View    Urinalysis With Culture If Indicated - Indwelling Urethral Catheter    Magnesium    Comprehensive Metabolic Panel    Lipase    Lactic Acid, Plasma    CBC Auto Differential    Urinalysis, Microscopic Only - Urine, Clean Catch    CBC & Differential       Additional orders considered but not placed:      Independent interpretation of labs, radiology studies, and discussions with consultants: See ED Course        AS OF 18:36 EDT VITALS:    BP - 180/75  HR - 77  TEMP - 98.2 °F (36.8 °C)  02 SATS - 95%        DIAGNOSIS  Final diagnoses:   Mild dehydration   Generalized weakness   Thrombocytopenia   History of diabetes mellitus   History of pulmonary hypertension   Chronic renal impairment, unspecified CKD stage         DISPOSITION  DISCHARGE    Patient discharged in stable condition.    Reviewed implications of results, diagnosis, meds, responsibility to follow up, warning signs and symptoms of possible worsening, potential complications and reasons to return to ER. If your blood pressure > 120/80 please follow up with your primary care provider for further  management.    Patient/Family voiced understanding of above instructions.    Discussed plan for discharge, as there is no emergent indication for admission. Pt/family is agreeable and understands need for follow up and repeat testing.  Pt is aware that discharge does not mean that nothing is wrong but it indicates no emergency is present that requires admission and they must continue care with follow-up as given below or physician of their choice.     FOLLOW-UP  [facility] Emergency Department  [address] [facility] [address] [phone]    As needed, If symptoms worsen    Mariah Hickman MD  [address]  [facility] [id number] [phone]    Schedule an appointment as soon as possible for a visit   for recheck within 1 week         Medication List        Changed      gabapentin 600 MG tablet  Commonly known as: NEURONTIN  Take 1 tablet by mouth 2 (Two) Times a Day.  What changed: how much to take     LORazepam 0.5 MG tablet  Commonly known as: ATIVAN  Take 1 tablet by mouth Every 8 (Eight) Hours As Needed for Anxiety.  What changed: how much to take                            --    Please note that portions of this were completed with a voice recognition program.       Note Disclaimer: At [facility], we believe that sharing information builds trust and better relationships. You are receiving this note because you are receiving care at [facility] or recently visited. It is possible you will see health information before a provider has talked with you about it. This kind of information can be easy to misunderstand. To help you fully understand what it means for your health, we urge you to discuss this note with your provider.           Carloz Tee MD  06/12/23 1874

## 2023-06-15 PROBLEM — J18.9 MULTIFOCAL PNEUMONIA: Status: ACTIVE | Noted: 2023-01-01

## 2023-06-19 ENCOUNTER — HOME HEALTH ADMISSION (OUTPATIENT)
Dept: HOME HEALTH SERVICES | Facility: HOME HEALTHCARE | Age: 88
End: 2023-06-19
Payer: MEDICARE

## 2023-06-19 ENCOUNTER — READMISSION MANAGEMENT (OUTPATIENT)
Dept: CALL CENTER | Facility: HOSPITAL | Age: 88
End: 2023-06-19
Payer: MEDICARE

## 2023-06-20 NOTE — OUTREACH NOTE
Prep Survey      Flowsheet Row Responses   [facility] facility patient discharged from? [place]   Is LACE score < 7 ? No   Eligibility Readm Mgmt   Discharge diagnosis Multifocal pneumonia   Does the patient have one of the following disease processes/diagnoses(primary or secondary)? Pneumonia   Does the patient have Home health ordered? Yes   What is the Home health agency?  [facility]   Is there a DME ordered? No   Prep survey completed? Yes            Laxmi FIGUEROA - Registered Nurse

## 2023-06-29 NOTE — TELEPHONE ENCOUNTER
Called pt's daughter and she reports that her mother is sick to her stomach all the time and is having a lot of reflux.    She is also choking on food and sometimes liquid.    Pt is taking pepcid 40mg daily and reglan 10mg at night as needed.  Pt has upcoming appt on 07/26 with Dr Martel and is asking what can her mother do or take until she can get into the office.  Advised will send message to Dr Martel and in the meantime if symptoms worsen advised to go to ER.    Verb understanding.     Pt placed on cancellation list.  Pt last seen 05/2019.

## 2023-06-29 NOTE — TELEPHONE ENCOUNTER
Caller: Radha Hoyos    Relationship: Emergency Contact    Best call back number: [phone]    What is the best time to reach you: ANYTIME    Who are you requesting to speak with (clinical staff, provider,  specific staff member): CLINICAL    What was the call regarding: PT'S DAUGHTER IS FRUSTRATED REGARDING HER MOTHER IS HAVING A LOT MORE STOMACH ISSUES. SHE SAID HER MOTHER HAS BEEN CHOKING ON HER FOOD FOR OVER MONTH AND SHE WANTS TO SEE WHAT CAN BE DONE FOR HER. SHE HAS AN APPT. IN JULY BUT NEEDS TO SEE IF DR JEFF CAN SEE HER SOONER.     Is it okay if the provider responds through MyChart: NO

## 2023-07-03 NOTE — TELEPHONE ENCOUNTER
I reviewed the patient's chart and tried to call the daughter but got no answer.  I would have her mother take the over-the-counter proton pump inhibitor such as Prilosec over-the-counter, Prevacid over-the-counter, or Nexium over-the-counter.  I would have the patient take 2 of any of these medicines (all are PPI's) daily and see if that improves the heartburn and if it makes the swallowing easier?  If this gets worse I would go to the emergency room. Paulina    Called pt's daughter and advised of the above. Verb understanding.

## 2023-07-26 ENCOUNTER — OFFICE VISIT (OUTPATIENT)
Dept: GASTROENTEROLOGY | Facility: CLINIC | Age: 88
End: 2023-07-26
Payer: MEDICARE

## 2023-07-26 VITALS
SYSTOLIC BLOOD PRESSURE: 133 MMHG | HEIGHT: 57 IN | BODY MASS INDEX: 35.27 KG/M2 | HEART RATE: 71 BPM | DIASTOLIC BLOOD PRESSURE: 71 MMHG | OXYGEN SATURATION: 93 % | TEMPERATURE: 97.3 F

## 2023-07-26 DIAGNOSIS — K21.9 GASTROESOPHAGEAL REFLUX DISEASE, UNSPECIFIED WHETHER ESOPHAGITIS PRESENT: Primary | ICD-10-CM

## 2023-07-26 DIAGNOSIS — R13.10 DYSPHAGIA, UNSPECIFIED TYPE: ICD-10-CM

## 2023-07-26 PROCEDURE — 99214 OFFICE O/P EST MOD 30 MIN: CPT | Performed by: INTERNAL MEDICINE

## 2023-07-26 PROCEDURE — 1159F MED LIST DOCD IN RCRD: CPT | Performed by: INTERNAL MEDICINE

## 2023-07-26 PROCEDURE — 1160F RVW MEDS BY RX/DR IN RCRD: CPT | Performed by: INTERNAL MEDICINE

## 2023-07-26 RX ORDER — LEVOFLOXACIN 500 MG/1
500 TABLET, FILM COATED ORAL DAILY
Qty: 10 TABLET | Refills: 0 | COMMUNITY
Start: 2023-07-21 | End: 2023-07-31

## 2023-07-26 RX ORDER — KETOTIFEN FUMARATE 0.35 MG/ML
SOLUTION/ DROPS OPHTHALMIC
COMMUNITY
Start: 2023-06-20

## 2023-07-26 RX ORDER — ESOMEPRAZOLE MAGNESIUM 40 MG/1
40 CAPSULE, DELAYED RELEASE ORAL DAILY
Qty: 90 CAPSULE | Refills: 3 | Status: SHIPPED | OUTPATIENT
Start: 2023-07-26

## 2023-07-26 RX ORDER — NALOXONE HYDROCHLORIDE 4 MG/.1ML
SPRAY NASAL
COMMUNITY
Start: 2023-07-18

## 2023-07-26 RX ORDER — CIPROFLOXACIN 250 MG/1
TABLET, FILM COATED ORAL
COMMUNITY
Start: 2023-07-18

## 2023-07-26 NOTE — PROGRESS NOTES
Chief Complaint   Patient presents with    alternating between diarrhea and constipation       History of Present Illness:   89 y.o. female       I have seen her in the past.  She was seen in consultation by Dr. Phuc Raymond as an inpatient on 12/24/2022:  Assessment:  Esophageal dysphagia: Suspect some component of dysmotility as well as anatomic issues including her esophageal diverticuli.  No obvious esophageal stricture noted on fluoroscopic examination.  History of gastroparesis with previous prokinetic use  Distant history of polyps  Chronic anemia  Possible cirrhosis of the liver  ITP  Thrombocytopenia  Diastolic congestive heart failure     Plan:  Would encourage patient to sit upright and follow speech pathology recommendations regarding dietary intake.  Do not see a need for endoscopic evaluation in this setting.  Doubt the patient would be a candidate for surgical intervention to address her diverticular condition          c/o intermittently food getting stuck in sternal notch and may vomit it up. +heartburn despite Nexium, pepcid,, Protonix. She is nauseated. She is trembling. One episode of aspiration PNA.        She last had an EGD and colonoscopy a long time ago.        C/o mild abdominal discomfort. +constipated occasionally. No diarrhea. No rectal bleeding or melena. Weight stable. Nonsmoker. No ETOH.     Past Medical History:   Diagnosis Date    Aortic valve stenosis     s/p tissue AVR    Back pain     CKD (chronic kidney disease)     Colitis due to Clostridioides difficile 01/26/2022    Diastolic dysfunction     Grade 2 per echocardiogram 2013    Diverticulosis     Exertional shortness of breath     Heart disease     Hiatal hernia     Hyperlipidemia     Hypertension     Hyperthyroidism     Hypertriglyceridemia 05/31/2018    Hypothyroidism     L5 compression fracture (HCC) 08/2022    Left ventricular hypertrophy     Legally blind     Liver disease     Low back pain     Macular degeneration      Mitral regurgitation     Osteoarthritis of hip     Osteoporosis     Pancreatitis 01/26/2022    Paroxysmal atrial fibrillation     Peripheral neuropathy     Premature ventricular contractions     Pulmonary hypertension     Renal insufficiency syndrome     Type 2 diabetes mellitus     Uterine cancer        Past Surgical History:   Procedure Laterality Date    AORTIC VALVE REPAIR/REPLACEMENT      APPENDECTOMY      CATARACT EXTRACTION      1970, 1999    CHOLECYSTECTOMY      COLONOSCOPY      ENDOSCOPY  08/15/2014    no gross lesions in stomach/duodenum, erythrematous mucosa in stomach    EPIDURAL BLOCK      HYSTERECTOMY  2007    STERNOTOMY      UPPER GASTROINTESTINAL ENDOSCOPY           Current Outpatient Medications:     benzonatate (Tessalon Perles) 100 MG capsule, Take 1 capsule by mouth 3 (Three) Times a Day As Needed for Cough., Disp: 60 capsule, Rfl: 0    bisacodyl (DULCOLAX) 5 MG EC tablet, Take 1 tablet by mouth Daily As Needed for Constipation., Disp: , Rfl:     bismuth subsalicylate (PEPTO BISMOL) 262 MG/15ML suspension, 30 mL EVERY 6 HOURS (route: oral), Disp: , Rfl:     budesonide-formoterol (SYMBICORT) 160-4.5 MCG/ACT inhaler, Inhale 2 puffs 2 (Two) Times a Day., Disp: , Rfl:     carvedilol (COREG) 12.5 MG tablet, Take 1 tablet by mouth 2 (Two) Times a Day With Meals., Disp: , Rfl:     cholecalciferol (VITAMIN D3) 25 MCG (1000 UT) tablet, Take 1 tablet by mouth Daily., Disp: , Rfl:     ciprofloxacin (CIPRO) 250 MG tablet, , Disp: , Rfl:     famotidine (PEPCID) 40 MG tablet, Take 1 tablet by mouth Every Night., Disp: 30 tablet, Rfl: 0    fluticasone-salmeterol (Advair HFA) 115-21 MCG/ACT inhaler, Inhale 2 puffs 2 (Two) Times a Day., Disp: 12 each, Rfl: 11    furosemide (LASIX) 40 MG tablet, Take 1 tablet by mouth 2 (Two) Times a Day., Disp: 60 tablet, Rfl: 0    gabapentin (NEURONTIN) 600 MG tablet, Take 1 tablet by mouth 2 (Two) Times a Day., Disp: 6 tablet, Rfl: 0    guaiFENesin (MUCINEX) 600 MG 12 hr  tablet, Take 1 tablet by mouth 2 (Two) Times a Day., Disp: , Rfl:     hydrALAZINE (APRESOLINE) 100 MG tablet, Take 0.5 tablets by mouth 3 (Three) Times a Day. Take 50 mg every morning and 100 mg every evening. May take an addition 50 mg during the day for SBP > 160 mmHg., Disp: 60 tablet, Rfl: 11    HYDROcodone-acetaminophen (NORCO) 7.5-325 MG per tablet, Take 1 tablet by mouth 5 (Five) Times a Day., Disp: , Rfl:     insulin glargine (LANTUS, SEMGLEE) 100 UNIT/ML injection, Inject 25 Units under the skin into the appropriate area as directed Every Night., Disp: , Rfl: 12    insulin lispro (ADMELOG) 100 UNIT/ML injection, Inject 0-9 Units under the skin into the appropriate area as directed 3 (Three) Times a Day Before Meals., Disp: , Rfl: 12    ketotifen (ZADITOR) 0.025 % ophthalmic solution, 1 drops DAILY (route: ophthalmic (eye)), Disp: , Rfl:     levoFLOXacin (LEVAQUIN) 500 MG tablet, Take 1 tablet by mouth Daily., Disp: 10 tablet, Rfl: 0    lidocaine (LIDODERM) 5 %, Place 1 patch on the skin as directed by provider Daily As Needed for Moderate Pain. Remove & Discard patch within 12 hours or as directed by MD, Disp: 30 patch, Rfl: 0    loratadine (CLARITIN) 10 MG tablet, Take 1 tablet by mouth Daily., Disp: , Rfl:     LORazepam (ATIVAN) 0.5 MG tablet, Take 1 tablet by mouth Every 8 (Eight) Hours As Needed for Anxiety. (Patient taking differently: Take 2 tablets by mouth Every Evening.), Disp: 9 tablet, Rfl: 0    magnesium oxide (MAG-OX) 400 MG tablet, Take 0.5 tablets by mouth Every Night., Disp: 90 tablet, Rfl: 3    montelukast (SINGULAIR) 10 MG tablet, Take 1 tablet by mouth Every Night., Disp: , Rfl:     naloxone (NARCAN) 4 MG/0.1ML nasal spray, , Disp: , Rfl:     ondansetron ODT (ZOFRAN-ODT) 4 MG disintegrating tablet, Place 1 tablet on the tongue Every 8 (Eight) Hours As Needed for Nausea., Disp: , Rfl:     polyethylene glycol (MIRALAX) 17 GM/SCOOP powder, Take 17 g by mouth Daily., Disp: , Rfl:      predniSONE (DELTASONE) 10 MG tablet, Take 1 tablet by mouth Daily., Disp: , Rfl:     Synthroid 25 MCG tablet, Take 1 tablet by mouth Daily., Disp: , Rfl:     tamsulosin (FLOMAX) 0.4 MG capsule 24 hr capsule, Take 1 capsule by mouth Daily., Disp: , Rfl:     esomeprazole (nexIUM) 40 MG capsule, Take 1 capsule by mouth Daily., Disp: 90 capsule, Rfl: 3    Allergies   Allergen Reactions    Baclofen Unknown - Low Severity     Yeast infection    Erythromycin Unknown (See Comments) and Other (See Comments)     Pt states she does not remember but it was many years ago  Pt states she does not remember but it was many years ago    Statins Myalgia    Cephalexin Other (See Comments) and Unknown (See Comments)     2022 Pt has tolerated ceftriaxone and cefepime during admission.   Pt states she does not remember reaction but it was many years ago    Penicillins Rash    Sulfa Antibiotics Itching and Rash       Family History   Problem Relation Age of Onset    Heart disease Mother     Hypertension Mother     Stroke Mother     Diabetes Mother         mellitus    Other Other         cardiovascular disorder       Social History     Socioeconomic History    Marital status:    Tobacco Use    Smoking status: Former     Packs/day: 0.50     Types: Cigarettes     Quit date: 7/10/1969     Years since quittin.0    Smokeless tobacco: Never   Vaping Use    Vaping Use: Never used   Substance and Sexual Activity    Alcohol use: No     Comment: caffeine use - coffee 2 cups daily    Drug use: No    Sexual activity: Defer       Review of Systems   Gastrointestinal:  Negative for abdominal pain.   All other systems reviewed and are negative.  Pertinent positives and negatives documented in the HPI and all other systems reviewed and were found to be negative.  Vitals:    23 1355   BP: 133/71   Pulse: 71   Temp: 97.3 °F (36.3 °C)   SpO2: 93%       Physical Exam  Vitals reviewed.   Constitutional:       General: She is not in acute  distress.     Appearance: Normal appearance. She is well-developed. She is not diaphoretic.      Comments: In a wheelchair and cannot get on an exam table.    HENT:      Head: Normocephalic and atraumatic. Hair is normal.      Right Ear: Hearing, tympanic membrane, ear canal and external ear normal. No decreased hearing noted. No drainage.      Left Ear: Hearing, tympanic membrane, ear canal and external ear normal. No decreased hearing noted.      Nose: Nose normal. No nasal deformity.      Mouth/Throat:      Mouth: Mucous membranes are moist.   Eyes:      General: Lids are normal.         Right eye: No discharge.         Left eye: No discharge.      Extraocular Movements: Extraocular movements intact.      Conjunctiva/sclera: Conjunctivae normal.      Pupils: Pupils are equal, round, and reactive to light.   Neck:      Thyroid: No thyromegaly.      Vascular: No JVD.      Trachea: No tracheal deviation.   Cardiovascular:      Rate and Rhythm: Normal rate and regular rhythm.      Pulses: Normal pulses.      Heart sounds: Normal heart sounds. No murmur heard.    No friction rub. No gallop.   Pulmonary:      Effort: Pulmonary effort is normal. No respiratory distress.      Breath sounds: Normal breath sounds. No wheezing or rales.   Chest:      Chest wall: No tenderness.   Abdominal:      General: Bowel sounds are normal. There is no distension.      Palpations: Abdomen is soft. There is no mass.      Tenderness: There is no abdominal tenderness. There is no guarding or rebound.      Hernia: No hernia is present.   Musculoskeletal:         General: No tenderness or deformity. Normal range of motion.      Cervical back: Normal range of motion and neck supple.   Lymphadenopathy:      Cervical: No cervical adenopathy.   Skin:     General: Skin is warm and dry.      Findings: No erythema or rash.   Neurological:      Mental Status: She is alert and oriented to person, place, and time.      Cranial Nerves: No cranial nerve  deficit.      Motor: No abnormal muscle tone.      Coordination: Coordination normal.      Deep Tendon Reflexes: Reflexes are normal and symmetric. Reflexes normal.   Psychiatric:         Mood and Affect: Mood normal.         Behavior: Behavior normal.         Thought Content: Thought content normal.         Judgment: Judgment normal.       Diagnoses and all orders for this visit:    1. Gastroesophageal reflux disease, unspecified whether esophagitis present (Primary)  -     esomeprazole (nexIUM) 40 MG capsule; Take 1 capsule by mouth Daily.  Dispense: 90 capsule; Refill: 3    2. Dysphagia, unspecified type  -     FL Esophagram Complete Single Contrast; Future      Assessment:  Dysphagia  Constipation      Recommendations:  Barium swallow  Nexium 40 mg/day  F/u 8 weeks.     Return in about 8 weeks (around 9/20/2023).    Freddy Martel MD  7/26/2023

## 2023-07-31 ENCOUNTER — TELEPHONE (OUTPATIENT)
Dept: ONCOLOGY | Facility: CLINIC | Age: 88
End: 2023-07-31
Payer: MEDICARE

## 2023-07-31 ENCOUNTER — HOSPITAL ENCOUNTER (INPATIENT)
Facility: HOSPITAL | Age: 88
LOS: 9 days | Discharge: HOME | DRG: 813 | End: 2023-08-10
Attending: EMERGENCY MEDICINE | Admitting: HOSPITALIST
Payer: MEDICARE

## 2023-07-31 ENCOUNTER — APPOINTMENT (OUTPATIENT)
Dept: GENERAL RADIOLOGY | Facility: HOSPITAL | Age: 88
DRG: 813 | End: 2023-07-31
Payer: MEDICARE

## 2023-07-31 DIAGNOSIS — K74.69 OTHER CIRRHOSIS OF LIVER: Primary | ICD-10-CM

## 2023-07-31 DIAGNOSIS — I10 UNCONTROLLED HYPERTENSION: ICD-10-CM

## 2023-07-31 DIAGNOSIS — E87.1 HYPONATREMIA: ICD-10-CM

## 2023-07-31 DIAGNOSIS — R21 RASH: ICD-10-CM

## 2023-07-31 DIAGNOSIS — D69.6 THROMBOCYTOPENIA: ICD-10-CM

## 2023-07-31 DIAGNOSIS — Z86.2 HISTORY OF ITP: ICD-10-CM

## 2023-07-31 LAB
ALBUMIN SERPL-MCNC: 3.6 G/DL (ref 3.5–5.2)
ALBUMIN/GLOB SERPL: 1.4 G/DL
ALP SERPL-CCNC: 33 U/L (ref 39–117)
ALT SERPL W P-5'-P-CCNC: 14 U/L (ref 1–33)
ANION GAP SERPL CALCULATED.3IONS-SCNC: 11.9 MMOL/L (ref 5–15)
APTT PPP: 25.1 SECONDS (ref 22.7–35.4)
AST SERPL-CCNC: 17 U/L (ref 1–32)
BASOPHILS # BLD AUTO: 0.01 10*3/MM3 (ref 0–0.2)
BASOPHILS NFR BLD AUTO: 0.1 % (ref 0–1.5)
BILIRUB SERPL-MCNC: 0.3 MG/DL (ref 0–1.2)
BUN SERPL-MCNC: 38 MG/DL (ref 8–23)
BUN/CREAT SERPL: 20.5 (ref 7–25)
CALCIUM SPEC-SCNC: 9 MG/DL (ref 8.2–9.6)
CHLORIDE SERPL-SCNC: 91 MMOL/L (ref 98–107)
CO2 SERPL-SCNC: 25.1 MMOL/L (ref 22–29)
CREAT SERPL-MCNC: 1.85 MG/DL (ref 0.57–1)
DEPRECATED RDW RBC AUTO: 50.6 FL (ref 37–54)
EGFRCR SERPLBLD CKD-EPI 2021: 25.3 ML/MIN/1.73
EOSINOPHIL # BLD AUTO: 0.01 10*3/MM3 (ref 0–0.4)
EOSINOPHIL NFR BLD AUTO: 0.1 % (ref 0.3–6.2)
ERYTHROCYTE [DISTWIDTH] IN BLOOD BY AUTOMATED COUNT: 17 % (ref 12.3–15.4)
GLOBULIN UR ELPH-MCNC: 2.5 GM/DL
GLUCOSE BLDC GLUCOMTR-MCNC: 103 MG/DL (ref 70–130)
GLUCOSE SERPL-MCNC: 98 MG/DL (ref 65–99)
HCT VFR BLD AUTO: 35.9 % (ref 34–46.6)
HGB BLD-MCNC: 12.1 G/DL (ref 12–15.9)
INR PPP: 1.15 (ref 0.9–1.1)
LYMPHOCYTES # BLD AUTO: 1.62 10*3/MM3 (ref 0.7–3.1)
LYMPHOCYTES NFR BLD AUTO: 19.9 % (ref 19.6–45.3)
MCH RBC QN AUTO: 28.1 PG (ref 26.6–33)
MCHC RBC AUTO-ENTMCNC: 33.7 G/DL (ref 31.5–35.7)
MCV RBC AUTO: 83.5 FL (ref 79–97)
MONOCYTES # BLD AUTO: 0.6 10*3/MM3 (ref 0.1–0.9)
MONOCYTES NFR BLD AUTO: 7.4 % (ref 5–12)
NEUTROPHILS NFR BLD AUTO: 5.81 10*3/MM3 (ref 1.7–7)
NEUTROPHILS NFR BLD AUTO: 71.5 % (ref 42.7–76)
PLAT MORPH BLD: NORMAL
PLATELET # BLD AUTO: 17 10*3/MM3 (ref 140–450)
PMV BLD AUTO: 10.6 FL (ref 6–12)
POTASSIUM SERPL-SCNC: 4 MMOL/L (ref 3.5–5.2)
PROT SERPL-MCNC: 6.1 G/DL (ref 6–8.5)
PROTHROMBIN TIME: 14.8 SECONDS (ref 11.7–14.2)
RBC # BLD AUTO: 4.3 10*6/MM3 (ref 3.77–5.28)
RBC MORPH BLD: NORMAL
SODIUM SERPL-SCNC: 128 MMOL/L (ref 136–145)
WBC MORPH BLD: NORMAL
WBC NRBC COR # BLD: 8.13 10*3/MM3 (ref 3.4–10.8)

## 2023-07-31 PROCEDURE — 99285 EMERGENCY DEPT VISIT HI MDM: CPT

## 2023-07-31 PROCEDURE — 72110 X-RAY EXAM L-2 SPINE 4/>VWS: CPT

## 2023-07-31 PROCEDURE — 85025 COMPLETE CBC W/AUTO DIFF WBC: CPT | Performed by: PHYSICIAN ASSISTANT

## 2023-07-31 PROCEDURE — 63710000001 INSULIN GLARGINE PER 5 UNITS: Performed by: PHYSICIAN ASSISTANT

## 2023-07-31 PROCEDURE — 82948 REAGENT STRIP/BLOOD GLUCOSE: CPT

## 2023-07-31 PROCEDURE — 85730 THROMBOPLASTIN TIME PARTIAL: CPT | Performed by: PHYSICIAN ASSISTANT

## 2023-07-31 PROCEDURE — 25010000002 METHYLPREDNISOLONE PER 125 MG: Performed by: PHYSICIAN ASSISTANT

## 2023-07-31 PROCEDURE — G0378 HOSPITAL OBSERVATION PER HR: HCPCS

## 2023-07-31 PROCEDURE — 80053 COMPREHEN METABOLIC PANEL: CPT | Performed by: PHYSICIAN ASSISTANT

## 2023-07-31 PROCEDURE — 85610 PROTHROMBIN TIME: CPT | Performed by: PHYSICIAN ASSISTANT

## 2023-07-31 PROCEDURE — 85007 BL SMEAR W/DIFF WBC COUNT: CPT | Performed by: PHYSICIAN ASSISTANT

## 2023-07-31 RX ORDER — FUROSEMIDE 20 MG/1
40 TABLET ORAL ONCE
Status: COMPLETED | OUTPATIENT
Start: 2023-07-31 | End: 2023-07-31

## 2023-07-31 RX ORDER — BUDESONIDE AND FORMOTEROL FUMARATE DIHYDRATE 160; 4.5 UG/1; UG/1
2 AEROSOL RESPIRATORY (INHALATION) ONCE
Status: DISCONTINUED | OUTPATIENT
Start: 2023-07-31 | End: 2023-08-01

## 2023-07-31 RX ORDER — GUAIFENESIN 600 MG/1
600 TABLET, EXTENDED RELEASE ORAL ONCE
Status: COMPLETED | OUTPATIENT
Start: 2023-07-31 | End: 2023-07-31

## 2023-07-31 RX ORDER — METHYLPREDNISOLONE SODIUM SUCCINATE 125 MG/2ML
125 INJECTION, POWDER, LYOPHILIZED, FOR SOLUTION INTRAMUSCULAR; INTRAVENOUS ONCE
Status: COMPLETED | OUTPATIENT
Start: 2023-07-31 | End: 2023-07-31

## 2023-07-31 RX ORDER — CARVEDILOL 6.25 MG/1
12.5 TABLET ORAL ONCE
Status: COMPLETED | OUTPATIENT
Start: 2023-07-31 | End: 2023-07-31

## 2023-07-31 RX ORDER — HYDROCODONE BITARTRATE AND ACETAMINOPHEN 7.5; 325 MG/1; MG/1
1 TABLET ORAL ONCE
Status: COMPLETED | OUTPATIENT
Start: 2023-07-31 | End: 2023-07-31

## 2023-07-31 RX ORDER — GABAPENTIN 300 MG/1
600 CAPSULE ORAL ONCE
Status: COMPLETED | OUTPATIENT
Start: 2023-07-31 | End: 2023-07-31

## 2023-07-31 RX ORDER — HYDRALAZINE HYDROCHLORIDE 50 MG/1
100 TABLET, FILM COATED ORAL ONCE
Status: COMPLETED | OUTPATIENT
Start: 2023-07-31 | End: 2023-07-31

## 2023-07-31 RX ORDER — FAMOTIDINE 20 MG/1
40 TABLET, FILM COATED ORAL ONCE
Status: COMPLETED | OUTPATIENT
Start: 2023-07-31 | End: 2023-07-31

## 2023-07-31 RX ORDER — MONTELUKAST SODIUM 10 MG/1
10 TABLET ORAL ONCE
Status: DISCONTINUED | OUTPATIENT
Start: 2023-07-31 | End: 2023-08-01

## 2023-07-31 RX ORDER — LORAZEPAM 0.5 MG/1
0.5 TABLET ORAL ONCE
Status: COMPLETED | OUTPATIENT
Start: 2023-07-31 | End: 2023-07-31

## 2023-07-31 RX ADMIN — INSULIN GLARGINE 25 UNITS: 100 INJECTION, SOLUTION SUBCUTANEOUS at 23:36

## 2023-07-31 RX ADMIN — LORAZEPAM 0.5 MG: 0.5 TABLET ORAL at 23:08

## 2023-07-31 RX ADMIN — HYDROCODONE BITARTRATE AND ACETAMINOPHEN 1 TABLET: 7.5; 325 TABLET ORAL at 23:11

## 2023-07-31 RX ADMIN — HYDRALAZINE HYDROCHLORIDE 100 MG: 50 TABLET, FILM COATED ORAL at 23:11

## 2023-07-31 RX ADMIN — CARVEDILOL 12.5 MG: 6.25 TABLET, FILM COATED ORAL at 23:08

## 2023-07-31 RX ADMIN — FUROSEMIDE 40 MG: 20 TABLET ORAL at 23:08

## 2023-07-31 RX ADMIN — GUAIFENESIN 600 MG: 600 TABLET, EXTENDED RELEASE ORAL at 23:11

## 2023-07-31 RX ADMIN — GABAPENTIN 600 MG: 300 CAPSULE ORAL at 23:08

## 2023-07-31 RX ADMIN — FAMOTIDINE 40 MG: 20 TABLET, FILM COATED ORAL at 23:11

## 2023-07-31 RX ADMIN — METHYLPREDNISOLONE SODIUM SUCCINATE 125 MG: 125 INJECTION, POWDER, FOR SOLUTION INTRAMUSCULAR; INTRAVENOUS at 23:38

## 2023-07-31 NOTE — ED NOTES
Patient presents for abnormal lab. Hx thrombocytopenia, but recent lab work showed platelet count of 24. Denies any recent traumas or bleeding. She does report a rash on the left side of her abdomen onset recently. Denies itching or pain. Is bright red macular rash along left flank.

## 2023-07-31 NOTE — ED PROVIDER NOTES
EMERGENCY DEPARTMENT ENCOUNTER    Room Number:  03/03  PCP: Mariah Hickman MD  Discussed/ obtained information from independent historians: Family at bedside      HPI:  Chief Complaint: Abnormal lab    Context: Helena Carmen is a 89 y.o. female who presents to the ED c/o abnormal lab.  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  She had platelet count of 50 last week, today when labs were checked it was 24.  She was referred to the ED for immediate evaluation.  Established with Dr. Sherman with oncology.  Patient has had increased low back pain but does have known L5 compression fracture.  Patient also recently developed rash along her left flank.  No known fever.  No known sick contacts.  Family has noted some increased bruising.  No reported bloody vomit, bloody stools, hematuria.  No other systemic complaints at this time.      External (non-ED) record review:   Reviewed note from office visit with GI on 7/26/2023 where patient seen for GERD and dysphagia.  Reviewed assessment and plan.  Plan to continue Nexium, schedule for esophagram.  Most recent BMP with creatinine 1.65.  Most recent point-of-care glucose 249.      PAST MEDICAL HISTORY  Active Ambulatory Problems     Diagnosis Date Noted    Aortic valve stenosis 04/05/2016    Fatigue 04/05/2016    MI (mitral incompetence) 04/05/2016    PVC (premature ventricular contraction) 04/05/2016    Legally blind 05/25/2018    Essential hypertension 05/25/2018    Hypertriglyceridemia 05/31/2018    Pulmonary hypertension 06/25/2020    Paroxysmal atrial fibrillation 06/25/2020    Anxiety 07/10/2014    Stage 4 chronic kidney disease 03/20/2015    Chronic pain disorder 01/26/2022    Degeneration of lumbar intervertebral disc 09/09/2014    Type 2 diabetes mellitus with hyperglycemia 01/26/2022    Esophageal reflux 07/10/2014    Gastroparesis 01/26/2022    Hiatal hernia 01/26/2022    Iatrogenic hypothyroidism 07/08/2014     Macular degeneration 01/26/2022    Malignant neoplasm of uterus 01/26/2022    Osteoarthritis of knee 07/10/2014    Peripheral nerve disease 07/10/2014    Secondary hyperparathyroidism 04/05/2016    Thrombocytopenia 07/11/2014    Chronic heart failure with preserved ejection fraction 03/02/2022    Other cirrhosis of liver 03/03/2022    Hypothyroidism 02/22/2022    Nonrheumatic tricuspid valve regurgitation 02/22/2022    Anemia, chronic disease 07/30/2022    Hyponatremia 07/30/2022    Closed fracture of fifth lumbar vertebra 08/24/2022    Closed fracture of fifth lumbar vertebra, unspecified fracture morphology, initial encounter 08/25/2022    Diabetic polyneuropathy associated with type 2 diabetes mellitus 08/26/2022    Chronic ITP (idiopathic thrombocytopenia) 08/28/2022    Chronic midline low back pain with bilateral sciatica 10/16/2022    Acute deep vein thrombosis of calf, bilateral 10/17/2022    Compression fracture of L5 vertebra with routine healing 10/17/2022    Acute left-sided low back pain with left-sided sciatica 03/07/2023    Urine retention 03/10/2023    Rib fracture 03/14/2023    Acute on chronic combined systolic and diastolic congestive heart failure 04/04/2023    Acute UTI 04/30/2023    Blindness right eye category 3, blindness left eye category 3 03/09/2022    Cognitive communication deficit 03/09/2022    Pseudomonas (aeruginosa) (mallei) (pseudomallei) as the cause of diseases classified elsewhere 06/09/2022    Multifocal pneumonia 06/15/2023     Resolved Ambulatory Problems     Diagnosis Date Noted    Diastolic dysfunction 04/05/2016    Hypertensive pulmonary vascular disease (HCC) 04/05/2016    Bioprosthetic aortic valve replacement 05/10/2017    Breast screening 07/08/2014    Abdominal pain, right lower quadrant 12/13/2020    Allergic rhinitis 07/10/2014    Angina pectoris 07/10/2014    Pancreatitis 01/26/2022    Colitis due to Clostridioides difficile 01/26/2022    Hypothyroidism 01/26/2022     Long term (current) use of opiate analgesic 01/26/2022    Uncontrolled type 2 diabetes mellitus 07/08/2014    Benign essential hypertension 07/08/2014    Hypertension 01/26/2022    Pulmonary hypertension 04/05/2016    Hypercholesterolemia 01/26/2022    Hyperlipidemia 07/08/2014    Legal blindness 05/25/2018    Mitral valve disorder 07/10/2014    Mitral valve regurgitation 04/05/2016    Ventricular premature beats 04/05/2016    History of aortic valve replacement 05/10/2017    Nonrheumatic tricuspid valve regurgitation     Elevated serum creatinine     CHF (congestive heart failure) 03/02/2022    Hypertensive disorder 02/22/2022    Pancreatitis 02/22/2022    Sepsis 06/05/2022    COVID-19 07/30/2022    Weakness generalized 07/30/2022    Leukopenia 08/02/2022    Cytokine release syndrome, grade 1 08/02/2022    Urine retention 08/02/2022    COVID-19 virus infection 08/06/2022    Acute DVT (deep venous thrombosis) 08/07/2022    Acute diastolic congestive heart failure 12/20/2022    Esophageal dysphagia 12/24/2022    Stage 3b chronic kidney disease 03/20/2015    Acute on chronic diastolic (congestive) heart failure 03/09/2022     Past Medical History:   Diagnosis Date    Back pain     CKD (chronic kidney disease)     Diverticulosis     Exertional shortness of breath     Heart disease     Hyperthyroidism     L5 compression fracture (HCC) 08/2022    Left ventricular hypertrophy     Liver disease     Low back pain     Mitral regurgitation     Osteoarthritis of hip     Osteoporosis     Peripheral neuropathy     Premature ventricular contractions     Renal insufficiency syndrome     Type 2 diabetes mellitus     Uterine cancer          PAST SURGICAL HISTORY  Past Surgical History:   Procedure Laterality Date    AORTIC VALVE REPAIR/REPLACEMENT      APPENDECTOMY      CATARACT EXTRACTION      1970, 1999    CHOLECYSTECTOMY      COLONOSCOPY      ENDOSCOPY  08/15/2014    no gross lesions in stomach/duodenum, erythrematous mucosa  in stomach    EPIDURAL BLOCK      HYSTERECTOMY  2007    STERNOTOMY      UPPER GASTROINTESTINAL ENDOSCOPY           FAMILY HISTORY  Family History   Problem Relation Age of Onset    Heart disease Mother     Hypertension Mother     Stroke Mother     Diabetes Mother         mellitus    Other Other         cardiovascular disorder         SOCIAL HISTORY  Social History     Socioeconomic History    Marital status:    Tobacco Use    Smoking status: Former     Packs/day: 0.50     Types: Cigarettes     Quit date: 7/10/1969     Years since quittin.0    Smokeless tobacco: Never   Vaping Use    Vaping Use: Never used   Substance and Sexual Activity    Alcohol use: No     Comment: caffeine use - coffee 2 cups daily    Drug use: No    Sexual activity: Defer         ALLERGIES  Baclofen, Erythromycin, Statins, Cephalexin, Penicillins, and Sulfa antibiotics        REVIEW OF SYSTEMS  Review of Systems   Constitutional:  Negative for chills and fever.   HENT:  Negative for ear pain and sore throat.    Respiratory:  Negative for cough and shortness of breath.    Cardiovascular:  Negative for chest pain and palpitations.   Gastrointestinal:  Negative for abdominal pain and vomiting.   Genitourinary:  Negative for dysuria and hematuria.   Musculoskeletal:  Negative for arthralgias and joint swelling.   Skin:  Positive for rash. Negative for pallor.   Neurological:  Negative for numbness and headaches.   Hematological:  Bruises/bleeds easily.   Psychiatric/Behavioral:  Negative for confusion and hallucinations.           PHYSICAL EXAM  ED Triage Vitals   Temp Heart Rate Resp BP SpO2   23 1800 23 1800 23 1800 23 1801 23 1800   97.8 øF (36.6 øC) 67 16 (!) 191/65 98 %      Temp src Heart Rate Source Patient Position BP Location FiO2 (%)   23 1800 23 1800 -- -- --   Tympanic Monitor          Physical Exam  Constitutional:       General: She is not in acute distress.     Appearance: She is  well-developed.   HENT:      Head: Normocephalic and atraumatic.   Eyes:      Extraocular Movements: Extraocular movements intact.   Cardiovascular:      Rate and Rhythm: Normal rate and regular rhythm.      Heart sounds: Normal heart sounds.   Pulmonary:      Effort: Pulmonary effort is normal.      Breath sounds: Normal breath sounds.   Abdominal:      General: There is no distension.      Comments: Erythematous macular clusters noted along the left flank   Genitourinary:     Comments: Indwelling Hoffman catheter  Skin:     General: Skin is warm.   Neurological:      General: No focal deficit present.      Mental Status: She is alert and oriented to person, place, and time.   Psychiatric:         Mood and Affect: Mood normal.           Vital signs and nursing notes reviewed.          LAB RESULTS  Recent Results (from the past 24 hour(s))   Comprehensive Metabolic Panel    Collection Time: 07/31/23  7:20 PM    Specimen: Blood   Result Value Ref Range    Glucose 98 65 - 99 mg/dL    BUN 38 (H) 8 - 23 mg/dL    Creatinine 1.85 (H) 0.57 - 1.00 mg/dL    Sodium 128 (L) 136 - 145 mmol/L    Potassium 4.0 3.5 - 5.2 mmol/L    Chloride 91 (L) 98 - 107 mmol/L    CO2 25.1 22.0 - 29.0 mmol/L    Calcium 9.0 8.2 - 9.6 mg/dL    Total Protein 6.1 6.0 - 8.5 g/dL    Albumin 3.6 3.5 - 5.2 g/dL    ALT (SGPT) 14 1 - 33 U/L    AST (SGOT) 17 1 - 32 U/L    Alkaline Phosphatase 33 (L) 39 - 117 U/L    Total Bilirubin 0.3 0.0 - 1.2 mg/dL    Globulin 2.5 gm/dL    A/G Ratio 1.4 g/dL    BUN/Creatinine Ratio 20.5 7.0 - 25.0    Anion Gap 11.9 5.0 - 15.0 mmol/L    eGFR 25.3 (L) >60.0 mL/min/1.73   Protime-INR    Collection Time: 07/31/23  7:20 PM    Specimen: Blood   Result Value Ref Range    Protime 14.8 (H) 11.7 - 14.2 Seconds    INR 1.15 (H) 0.90 - 1.10   aPTT    Collection Time: 07/31/23  7:20 PM    Specimen: Blood   Result Value Ref Range    PTT 25.1 22.7 - 35.4 seconds   CBC Auto Differential    Collection Time: 07/31/23  7:20 PM    Specimen:  Blood   Result Value Ref Range    WBC 8.13 3.40 - 10.80 10*3/mm3    RBC 4.30 3.77 - 5.28 10*6/mm3    Hemoglobin 12.1 12.0 - 15.9 g/dL    Hematocrit 35.9 34.0 - 46.6 %    MCV 83.5 79.0 - 97.0 fL    MCH 28.1 26.6 - 33.0 pg    MCHC 33.7 31.5 - 35.7 g/dL    RDW 17.0 (H) 12.3 - 15.4 %    RDW-SD 50.6 37.0 - 54.0 fl    MPV 10.6 6.0 - 12.0 fL    Platelets 17 (C) 140 - 450 10*3/mm3    Neutrophil % 71.5 42.7 - 76.0 %    Lymphocyte % 19.9 19.6 - 45.3 %    Monocyte % 7.4 5.0 - 12.0 %    Eosinophil % 0.1 (L) 0.3 - 6.2 %    Basophil % 0.1 0.0 - 1.5 %    Neutrophils, Absolute 5.81 1.70 - 7.00 10*3/mm3    Lymphocytes, Absolute 1.62 0.70 - 3.10 10*3/mm3    Monocytes, Absolute 0.60 0.10 - 0.90 10*3/mm3    Eosinophils, Absolute 0.01 0.00 - 0.40 10*3/mm3    Basophils, Absolute 0.01 0.00 - 0.20 10*3/mm3   Scan Slide    Collection Time: 07/31/23  7:20 PM    Specimen: Blood   Result Value Ref Range    RBC Morphology Normal Normal    WBC Morphology Normal Normal    Platelet Morphology Normal Normal       Ordered the above labs and reviewed the results.        RADIOLOGY  XR Spine Lumbar Complete 4+VW    Result Date: 7/31/2023  XR SPINE LUMBAR COMPLETE 4+VW-  07/31/2023  HISTORY: L5 fracture with pain.  There is a compression deformity of L5 which appears similar to the 06/15/2023 study. There is L1-L2, L2-3 and L3-4 moderately severe disc space narrowing.  No acute fractures are seen. There is some aortic calcification. Hypertrophic changes are seen. The facet joints appear relatively well-maintained.      1. Compression deformity of L5 with approximately 35% loss of the vertebral body height. This does not appear acute and appears unchanged from the 06/15/2023 study. 2. No acute fractures are seen.  This report was finalized on 7/31/2023 7:43 PM by Dr. Herbie Pike M.D.       Ordered the above noted radiological studies. Reviewed by me in PACS.            MEDICATIONS GIVEN IN ER  Medications   montelukast (SINGULAIR) tablet 10 mg (has  no administration in time range)   insulin glargine (LANTUS, SEMGLEE) injection 25 Units (has no administration in time range)   budesonide-formoterol (SYMBICORT) 160-4.5 MCG/ACT inhaler 2 puff (has no administration in time range)   methylPREDNISolone sodium succinate (SOLU-Medrol) injection 125 mg (has no administration in time range)   guaiFENesin (MUCINEX) 12 hr tablet 600 mg (600 mg Oral Given 7/31/23 2311)   hydrALAZINE (APRESOLINE) tablet 100 mg (100 mg Oral Given 7/31/23 2311)   famotidine (PEPCID) tablet 40 mg (40 mg Oral Given 7/31/23 2311)   furosemide (LASIX) tablet 40 mg (40 mg Oral Given 7/31/23 2308)   carvedilol (COREG) tablet 12.5 mg (12.5 mg Oral Given 7/31/23 2308)   LORazepam (ATIVAN) tablet 0.5 mg (0.5 mg Oral Given 7/31/23 2308)   HYDROcodone-acetaminophen (NORCO) 7.5-325 MG per tablet 1 tablet (1 tablet Oral Given 7/31/23 2311)   gabapentin (NEURONTIN) capsule 600 mg (600 mg Oral Given 7/31/23 2308)               MEDICAL DECISION MAKING, PROGRESS, and CONSULTS    All labs have been independently reviewed by me.  All radiology studies have been reviewed by me and I have also reviewed the radiology report.   EKG's independently viewed and interpreted by me.  Discussion below represents my analysis of pertinent findings related to patient's condition, differential diagnosis, treatment plan and final disposition.            Orders placed during this visit:  Orders Placed This Encounter   Procedures    XR Spine Lumbar Complete 4+VW    Comprehensive Metabolic Panel    Protime-INR    aPTT    CBC Auto Differential    Scan Slide    [facility] (on-call MD unless specified)    IP General Consult (Use specialty-specific consult if known)    Initiate Observation Status    CBC & Differential           Differential diagnosis:  ITP, NADER, hypertensive urgency      Independent interpretation of labs, radiology studies, and discussions with consultants:  ED Course as of 07/31/23 2322 Mon Jul 31, 2023   1937 X-ray  lumbar spine independently interpreted by me as compression fracture to L5 [MP]   1957 BUN(!): 38 [MP]   1957 Creatinine(!): 1.85 [MP]   1957 Sodium(!): 128 [MP]   2026 Platelets(!!): 17 [MP]   2121 Spoke with Dr. Jeffrey with [facility].  Discussed patient presentation and ED findings.  He agrees to admit to a telemetry bed. [MP]   2249 Ordered one-time dose of all of patient's nightly meds [MP]   2253 Hematology paged but I never received a phone call back [MP]   2321 Spoke with hematology.  Discussed patient presentation and ED findings.  They recommend first dose of Solu-Medrol 125 mg. [MP]      ED Course User Index  [MP] Radha Rome PA-C       Additional orders considered but not ordered:  Steroids      Additional sources:    - Chronic or social conditions impacting care: ITP          DIAGNOSIS  Final diagnoses:   Thrombocytopenia   Hyponatremia   History of ITP   Rash (left flank)   Uncontrolled hypertension         Latest Documented Vital Signs:  As of 21:23 EDT  BP- (!) 193/66 HR- 59 Temp- 97.8 øF (36.6 øC) (Tympanic) O2 sat- 98%              --    Please note that portions of this were completed with a voice recognition program.       Note Disclaimer: At [facility], we believe that sharing information builds trust and better relationships. You are receiving this note because you are receiving care at [facility] or recently visited. It is possible you will see health information before a provider has talked with you about it. This kind of information can be easy to misunderstand. To help you fully understand what it means for your health, we urge you to discuss this note with your provider.             Radha Rome PA-C  07/31/23 [id number]       Radha Rome PA-C  07/31/23 1695

## 2023-08-01 LAB
BASOPHILS # BLD AUTO: 0.01 10*3/MM3 (ref 0–0.2)
BASOPHILS NFR BLD AUTO: 0.2 % (ref 0–1.5)
DEPRECATED RDW RBC AUTO: 51.5 FL (ref 37–54)
EOSINOPHIL # BLD AUTO: 0 10*3/MM3 (ref 0–0.4)
EOSINOPHIL NFR BLD AUTO: 0 % (ref 0.3–6.2)
ERYTHROCYTE [DISTWIDTH] IN BLOOD BY AUTOMATED COUNT: 16.9 % (ref 12.3–15.4)
FOLATE SERPL-MCNC: 14.5 NG/ML (ref 4.78–24.2)
GLUCOSE BLDC GLUCOMTR-MCNC: 300 MG/DL (ref 70–130)
GLUCOSE BLDC GLUCOMTR-MCNC: 333 MG/DL (ref 70–130)
HCT VFR BLD AUTO: 35.9 % (ref 34–46.6)
HGB BLD-MCNC: 11.8 G/DL (ref 12–15.9)
LYMPHOCYTES # BLD AUTO: 0.71 10*3/MM3 (ref 0.7–3.1)
LYMPHOCYTES NFR BLD AUTO: 11.3 % (ref 19.6–45.3)
MCH RBC QN AUTO: 27.8 PG (ref 26.6–33)
MCHC RBC AUTO-ENTMCNC: 32.9 G/DL (ref 31.5–35.7)
MCV RBC AUTO: 84.5 FL (ref 79–97)
MONOCYTES # BLD AUTO: 0.08 10*3/MM3 (ref 0.1–0.9)
MONOCYTES NFR BLD AUTO: 1.3 % (ref 5–12)
NEUTROPHILS NFR BLD AUTO: 5.42 10*3/MM3 (ref 1.7–7)
NEUTROPHILS NFR BLD AUTO: 86.4 % (ref 42.7–76)
NT-PROBNP SERPL-MCNC: 2234 PG/ML (ref 0–1800)
PLATELET # BLD AUTO: 16 10*3/MM3 (ref 140–450)
PLATELET # BLD AUTO: 16 10*3/MM3 (ref 140–450)
PLATELETS.RETICULATED NFR BLD AUTO: 15.3 % (ref 0.9–6.5)
RBC # BLD AUTO: 4.25 10*6/MM3 (ref 3.77–5.28)
VIT B12 BLD-MCNC: 287 PG/ML (ref 211–946)
WBC NRBC COR # BLD: 6.27 10*3/MM3 (ref 3.4–10.8)

## 2023-08-01 PROCEDURE — 63710000001 INSULIN GLARGINE PER 5 UNITS: Performed by: INTERNAL MEDICINE

## 2023-08-01 PROCEDURE — 63710000001 INSULIN LISPRO (HUMAN) PER 5 UNITS: Performed by: HOSPITALIST

## 2023-08-01 PROCEDURE — 25010000002 METHYLPREDNISOLONE PER 125 MG: Performed by: INTERNAL MEDICINE

## 2023-08-01 PROCEDURE — 25010000002 ONDANSETRON PER 1 MG: Performed by: INTERNAL MEDICINE

## 2023-08-01 PROCEDURE — 83921 ORGANIC ACID SINGLE QUANT: CPT | Performed by: INTERNAL MEDICINE

## 2023-08-01 PROCEDURE — 99222 1ST HOSP IP/OBS MODERATE 55: CPT | Performed by: INTERNAL MEDICINE

## 2023-08-01 PROCEDURE — 94640 AIRWAY INHALATION TREATMENT: CPT

## 2023-08-01 PROCEDURE — 82948 REAGENT STRIP/BLOOD GLUCOSE: CPT

## 2023-08-01 PROCEDURE — 94799 UNLISTED PULMONARY SVC/PX: CPT

## 2023-08-01 PROCEDURE — 85055 RETICULATED PLATELET ASSAY: CPT | Performed by: INTERNAL MEDICINE

## 2023-08-01 PROCEDURE — 63710000001 INSULIN GLARGINE PER 5 UNITS: Performed by: HOSPITALIST

## 2023-08-01 PROCEDURE — 82607 VITAMIN B-12: CPT | Performed by: INTERNAL MEDICINE

## 2023-08-01 PROCEDURE — 83880 ASSAY OF NATRIURETIC PEPTIDE: CPT | Performed by: HOSPITALIST

## 2023-08-01 PROCEDURE — 82746 ASSAY OF FOLIC ACID SERUM: CPT | Performed by: INTERNAL MEDICINE

## 2023-08-01 PROCEDURE — 85025 COMPLETE CBC W/AUTO DIFF WBC: CPT | Performed by: INTERNAL MEDICINE

## 2023-08-01 RX ORDER — LORAZEPAM 0.5 MG/1
0.5 TABLET ORAL ONCE
Status: DISCONTINUED | OUTPATIENT
Start: 2023-08-01 | End: 2023-08-01

## 2023-08-01 RX ORDER — LEVOTHYROXINE SODIUM 0.03 MG/1
25 TABLET ORAL EVERY MORNING
Status: DISCONTINUED | OUTPATIENT
Start: 2023-08-01 | End: 2023-08-10 | Stop reason: HOSPADM

## 2023-08-01 RX ORDER — HYDROCODONE BITARTRATE AND ACETAMINOPHEN 7.5; 325 MG/1; MG/1
1 TABLET ORAL EVERY 4 HOURS PRN
Status: DISCONTINUED | OUTPATIENT
Start: 2023-08-01 | End: 2023-08-10

## 2023-08-01 RX ORDER — HYDROCODONE BITARTRATE AND ACETAMINOPHEN 7.5; 325 MG/1; MG/1
1 TABLET ORAL
Status: DISCONTINUED | OUTPATIENT
Start: 2023-08-01 | End: 2023-08-01

## 2023-08-01 RX ORDER — CARVEDILOL 6.25 MG/1
12.5 TABLET ORAL 2 TIMES DAILY WITH MEALS
Status: DISCONTINUED | OUTPATIENT
Start: 2023-08-01 | End: 2023-08-02

## 2023-08-01 RX ORDER — BUDESONIDE AND FORMOTEROL FUMARATE DIHYDRATE 160; 4.5 UG/1; UG/1
2 AEROSOL RESPIRATORY (INHALATION) 2 TIMES DAILY
Status: DISCONTINUED | OUTPATIENT
Start: 2023-08-01 | End: 2023-08-10 | Stop reason: HOSPADM

## 2023-08-01 RX ORDER — HYDRALAZINE HYDROCHLORIDE 50 MG/1
100 TABLET, FILM COATED ORAL ONCE
Status: DISCONTINUED | OUTPATIENT
Start: 2023-08-01 | End: 2023-08-01

## 2023-08-01 RX ORDER — LORAZEPAM 1 MG/1
1 TABLET ORAL EVERY EVENING
Status: DISCONTINUED | OUTPATIENT
Start: 2023-08-01 | End: 2023-08-10 | Stop reason: HOSPADM

## 2023-08-01 RX ORDER — GUAIFENESIN 600 MG/1
600 TABLET, EXTENDED RELEASE ORAL EVERY 12 HOURS SCHEDULED
Status: DISCONTINUED | OUTPATIENT
Start: 2023-08-01 | End: 2023-08-10 | Stop reason: HOSPADM

## 2023-08-01 RX ORDER — DEXTROSE MONOHYDRATE 25 G/50ML
25 INJECTION, SOLUTION INTRAVENOUS
Status: DISCONTINUED | OUTPATIENT
Start: 2023-08-01 | End: 2023-08-03

## 2023-08-01 RX ORDER — MELATONIN
1000 DAILY
Status: DISCONTINUED | OUTPATIENT
Start: 2023-08-01 | End: 2023-08-10 | Stop reason: HOSPADM

## 2023-08-01 RX ORDER — METHYLPREDNISOLONE SODIUM SUCCINATE 125 MG/2ML
80 INJECTION, POWDER, LYOPHILIZED, FOR SOLUTION INTRAMUSCULAR; INTRAVENOUS DAILY
Status: DISCONTINUED | OUTPATIENT
Start: 2023-08-01 | End: 2023-08-03

## 2023-08-01 RX ORDER — CYANOCOBALAMIN 1000 UG/ML
1000 INJECTION, SOLUTION INTRAMUSCULAR; SUBCUTANEOUS DAILY
Status: DISCONTINUED | OUTPATIENT
Start: 2023-08-01 | End: 2023-08-06

## 2023-08-01 RX ORDER — FUROSEMIDE 40 MG/1
40 TABLET ORAL ONCE
Status: DISCONTINUED | OUTPATIENT
Start: 2023-08-01 | End: 2023-08-01

## 2023-08-01 RX ORDER — NICOTINE POLACRILEX 4 MG
15 LOZENGE BUCCAL
Status: DISCONTINUED | OUTPATIENT
Start: 2023-08-01 | End: 2023-08-03

## 2023-08-01 RX ORDER — PANTOPRAZOLE SODIUM 40 MG/1
40 TABLET, DELAYED RELEASE ORAL
Status: DISCONTINUED | OUTPATIENT
Start: 2023-08-01 | End: 2023-08-10 | Stop reason: HOSPADM

## 2023-08-01 RX ORDER — CARVEDILOL 12.5 MG/1
12.5 TABLET ORAL ONCE
Status: DISCONTINUED | OUTPATIENT
Start: 2023-08-01 | End: 2023-08-01

## 2023-08-01 RX ORDER — GABAPENTIN 300 MG/1
600 CAPSULE ORAL ONCE
Status: DISCONTINUED | OUTPATIENT
Start: 2023-08-01 | End: 2023-08-01

## 2023-08-01 RX ORDER — INSULIN LISPRO 100 [IU]/ML
2-7 INJECTION, SOLUTION INTRAVENOUS; SUBCUTANEOUS
Status: DISCONTINUED | OUTPATIENT
Start: 2023-08-01 | End: 2023-08-10 | Stop reason: HOSPADM

## 2023-08-01 RX ORDER — IBUPROFEN 600 MG/1
1 TABLET ORAL
Status: DISCONTINUED | OUTPATIENT
Start: 2023-08-01 | End: 2023-08-03

## 2023-08-01 RX ORDER — GABAPENTIN 300 MG/1
600 CAPSULE ORAL EVERY 12 HOURS SCHEDULED
Status: DISCONTINUED | OUTPATIENT
Start: 2023-08-01 | End: 2023-08-10 | Stop reason: HOSPADM

## 2023-08-01 RX ORDER — ONDANSETRON 2 MG/ML
4 INJECTION INTRAMUSCULAR; INTRAVENOUS EVERY 6 HOURS PRN
Status: DISCONTINUED | OUTPATIENT
Start: 2023-08-01 | End: 2023-08-10

## 2023-08-01 RX ORDER — FAMOTIDINE 20 MG/1
40 TABLET, FILM COATED ORAL ONCE
Status: DISCONTINUED | OUTPATIENT
Start: 2023-08-01 | End: 2023-08-01

## 2023-08-01 RX ORDER — INSULIN LISPRO 100 [IU]/ML
5 INJECTION, SOLUTION INTRAVENOUS; SUBCUTANEOUS
Status: DISCONTINUED | OUTPATIENT
Start: 2023-08-01 | End: 2023-08-02

## 2023-08-01 RX ORDER — HYDROCODONE BITARTRATE AND ACETAMINOPHEN 7.5; 325 MG/1; MG/1
1 TABLET ORAL ONCE
Status: DISCONTINUED | OUTPATIENT
Start: 2023-08-01 | End: 2023-08-01

## 2023-08-01 RX ORDER — HYDRALAZINE HYDROCHLORIDE 50 MG/1
50 TABLET, FILM COATED ORAL 3 TIMES DAILY
Status: DISCONTINUED | OUTPATIENT
Start: 2023-08-01 | End: 2023-08-02

## 2023-08-01 RX ORDER — FUROSEMIDE 20 MG/1
40 TABLET ORAL 2 TIMES DAILY
Status: DISCONTINUED | OUTPATIENT
Start: 2023-08-01 | End: 2023-08-01

## 2023-08-01 RX ORDER — MONTELUKAST SODIUM 10 MG/1
10 TABLET ORAL NIGHTLY
Status: DISCONTINUED | OUTPATIENT
Start: 2023-08-01 | End: 2023-08-10 | Stop reason: HOSPADM

## 2023-08-01 RX ORDER — TAMSULOSIN HYDROCHLORIDE 0.4 MG/1
0.4 CAPSULE ORAL DAILY
Status: DISCONTINUED | OUTPATIENT
Start: 2023-08-01 | End: 2023-08-10 | Stop reason: HOSPADM

## 2023-08-01 RX ADMIN — HYDROCODONE BITARTRATE AND ACETAMINOPHEN 1 TABLET: 7.5; 325 TABLET ORAL at 06:02

## 2023-08-01 RX ADMIN — HYDRALAZINE HYDROCHLORIDE 50 MG: 50 TABLET, FILM COATED ORAL at 20:48

## 2023-08-01 RX ADMIN — HYDROCODONE BITARTRATE AND ACETAMINOPHEN 1 TABLET: 7.5; 325 TABLET ORAL at 20:48

## 2023-08-01 RX ADMIN — HYDRALAZINE HYDROCHLORIDE 50 MG: 50 TABLET, FILM COATED ORAL at 18:03

## 2023-08-01 RX ADMIN — INSULIN LISPRO 5 UNITS: 100 INJECTION, SOLUTION INTRAVENOUS; SUBCUTANEOUS at 22:00

## 2023-08-01 RX ADMIN — INSULIN LISPRO 5 UNITS: 100 INJECTION, SOLUTION INTRAVENOUS; SUBCUTANEOUS at 18:03

## 2023-08-01 RX ADMIN — GUAIFENESIN 600 MG: 600 TABLET, EXTENDED RELEASE ORAL at 12:56

## 2023-08-01 RX ADMIN — FUROSEMIDE 40 MG: 20 TABLET ORAL at 01:54

## 2023-08-01 RX ADMIN — INSULIN LISPRO 5 UNITS: 100 INJECTION, SOLUTION INTRAVENOUS; SUBCUTANEOUS at 18:08

## 2023-08-01 RX ADMIN — LEVOTHYROXINE SODIUM 25 MCG: 0.03 TABLET ORAL at 06:03

## 2023-08-01 RX ADMIN — BUDESONIDE AND FORMOTEROL FUMARATE DIHYDRATE 2 PUFF: 160; 4.5 AEROSOL RESPIRATORY (INHALATION) at 08:47

## 2023-08-01 RX ADMIN — MONTELUKAST SODIUM 10 MG: 10 TABLET, FILM COATED ORAL at 06:02

## 2023-08-01 RX ADMIN — PANTOPRAZOLE SODIUM 40 MG: 40 TABLET, DELAYED RELEASE ORAL at 06:02

## 2023-08-01 RX ADMIN — GABAPENTIN 600 MG: 300 CAPSULE ORAL at 20:47

## 2023-08-01 RX ADMIN — INSULIN GLARGINE 25 UNITS: 100 INJECTION, SOLUTION SUBCUTANEOUS at 01:54

## 2023-08-01 RX ADMIN — GUAIFENESIN 600 MG: 600 TABLET, EXTENDED RELEASE ORAL at 20:47

## 2023-08-01 RX ADMIN — LORAZEPAM 1 MG: 1 TABLET ORAL at 18:03

## 2023-08-01 RX ADMIN — INSULIN GLARGINE 15 UNITS: 100 INJECTION, SOLUTION SUBCUTANEOUS at 20:47

## 2023-08-01 RX ADMIN — MONTELUKAST SODIUM 10 MG: 10 TABLET, FILM COATED ORAL at 20:47

## 2023-08-01 RX ADMIN — GABAPENTIN 600 MG: 300 CAPSULE ORAL at 01:54

## 2023-08-01 RX ADMIN — BUDESONIDE AND FORMOTEROL FUMARATE DIHYDRATE 2 PUFF: 160; 4.5 AEROSOL RESPIRATORY (INHALATION) at 21:11

## 2023-08-01 RX ADMIN — GABAPENTIN 600 MG: 300 CAPSULE ORAL at 12:55

## 2023-08-01 RX ADMIN — CARVEDILOL 12.5 MG: 6.25 TABLET, FILM COATED ORAL at 18:03

## 2023-08-01 RX ADMIN — INSULIN LISPRO 5 UNITS: 100 INJECTION, SOLUTION INTRAVENOUS; SUBCUTANEOUS at 12:56

## 2023-08-01 RX ADMIN — METHYLPREDNISOLONE SODIUM SUCCINATE 80 MG: 125 INJECTION, POWDER, FOR SOLUTION INTRAMUSCULAR; INTRAVENOUS at 12:56

## 2023-08-01 RX ADMIN — HYDRALAZINE HYDROCHLORIDE 50 MG: 50 TABLET, FILM COATED ORAL at 12:56

## 2023-08-01 RX ADMIN — ONDANSETRON 4 MG: 2 INJECTION INTRAMUSCULAR; INTRAVENOUS at 20:44

## 2023-08-01 RX ADMIN — TAMSULOSIN HYDROCHLORIDE 0.4 MG: 0.4 CAPSULE ORAL at 12:56

## 2023-08-01 NOTE — ED PROVIDER NOTES
MD ATTESTATION NOTE    The LUCRETIA and I have discussed this patient's history, physical exam, and treatment plan.  I have reviewed the documentation and personally had a face to face interaction with the patient. I affirm the documentation and agree with the treatment and plan.  The attached note describes my personal findings.      I provided a substantive portion of the care of the patient.  I personally performed the physical exam in its entirety, and below are my findings.  For this patient encounter, the patient wore surgical mask, I wore full protective PPE including N95 and eye protection.      Brief HPI: This elderly patient has a known history of ITP and follows with Dr. Sherman in the [facility].  She had recent outpatient labs performed and was notified today that her platelet counts were very low.  She was advised to come here for further evaluation.  Patient denies any recent blood loss from the stools or urine.  She herself has no independent new complaints.  Her daughter accompanies her at the bedside.    PHYSICAL EXAM  ED Triage Vitals   Temp Heart Rate Resp BP SpO2   07/31/23 1800 07/31/23 1800 07/31/23 1800 07/31/23 1801 07/31/23 1800   97.8 øF (36.6 øC) 67 16 (!) 191/65 98 %      Temp src Heart Rate Source Patient Position BP Location FiO2 (%)   07/31/23 1800 07/31/23 1800 -- -- --   Tympanic Monitor            GENERAL: Pleasant, elderly lady, calm, no acute distress  HENT: nares patent, normocephalic, atraumatic  EYES: no scleral icterus, normal conjunctivae  CV: Normal pulses, normal rate  RESPIRATORY: normal effort, no stridor  ABDOMEN: soft,, nontender  MUSCULOSKELETAL: no deformity  NEURO: alert, moves all extremities, follows commands  PSYCH:  calm, cooperative  SKIN: warm, dry, multiple bruises and petechiae to the soft tissues especially in the extremities.    Vital signs and nursing notes reviewed.        Plan: Ordering typical labs for hematology work-up including CBC to verify the extent of  her thrombocytopenia.  We will page hematology for consultation and guidance regarding clinical management.    Starting patient on IV steroids and arranging to admit her to the hospitalist team for further medical management.       Wing Viera MD  08/01/23 0242

## 2023-08-01 NOTE — PLAN OF CARE
Goal Outcome Evaluation:  Plan of Care Reviewed With: patient        Progress: no change  Outcome Evaluation: Patient is a 89 y/o female with history of ITP. Went to ER due to low platelet count. Advised to stay for observation. All due medication given

## 2023-08-01 NOTE — CASE MANAGEMENT/SOCIAL WORK
Discharge Planning Assessment  [facility]     Patient Name: Helena Carmen  MRN: [id number]  Today's Date: 8/1/2023    Admit Date: 7/31/2023    Plan: Dc home with 24/7 family assist and [facility]. Family to transport.   Discharge Needs Assessment       Row Name 08/01/23 1529       Living Environment    People in Home child(edison), adult;grandchild(edison)    Current Living Arrangements home    Potentially Unsafe Housing Conditions none    Primary Care Provided by child(edison);other (see comments)    Provides Primary Care For no one    Family Caregiver if Needed none    Quality of Family Relationships helpful;involved;supportive    Able to Return to Prior Arrangements yes       Resource/Environmental Concerns    Resource/Environmental Concerns none       Transition Planning    Patient/Family Anticipates Transition to home with family    Patient/Family Anticipated Services at Transition none    Transportation Anticipated family or friend will provide       Discharge Needs Assessment    Readmission Within the Last 30 Days no previous admission in last 30 days    Equipment Currently Used at Home walker, standard;cane, straight;grab bar    Concerns to be Addressed denies needs/concerns at this time    Anticipated Changes Related to Illness none    Equipment Needed After Discharge none    Provided Post Acute Provider List? N/A    Provided Post Acute Provider Quality & Resource List? N/A                   Discharge Plan       Row Name 08/01/23 1529       Plan    Plan Dc home with 24/7 family assist and [facility]. Family to transport.    Plan Comments Nubia noted. CCP met with the patient and family at bedside. CCP introduced self and explained role of CCP. Patient confirmed her information on her face sheet is accurate. Patient uses [facility] on [address]. Her PCP is Mariah Hickman. Patient stated that she is current with [facility]. [facility]CP contacted [facility] to let them know she is here and they requested her to be put in their basket.  CCP made the referral. Patient has been to [facility] in the past. Pt uses a walker, cane, grab bars, and transport chair at home. Patients lives at home with 24/7 caregivers which are her daughter, son, and grandchildren. She stated that she is never alone and has all the support she needs. Pts home is one floor and only one step to get into the home. Pts plans to go home at [place] with 24/7 family assist and [facility]. Family to transport. Pt denies needs. CCP to follow.                  Continued Care and Services - Admitted Since 7/31/2023       Home Medical Care       Service Provider Request Status Selected Services Address Phone Fax Patient Preferred    [facility] [address] [facility], [facility] [address] [id number] [phone] --                  Selected Continued Care - Prior Encounters Includes continued care and service providers with selected services from prior encounters from 5/2/2023 to 8/1/2023      Discharged on 6/19/2023 Admission date: 6/15/2023 - Discharge disposition: Home-Health Care [facility]      Home Medical Care       Service Provider Selected Services Address Phone Fax Patient Preferred    [facility] [address] [facility], [facility] [address] [address] [phone] --                      Discharged on 5/4/2023 Admission date: 4/30/2023 - Discharge disposition: Home-Health Care [facility]      Home Medical Care       Service Provider Selected Services Address Phone Fax Patient Preferred    [facility] [address] [place], [place] [id number] [phone] --                          Expected Discharge Date and Time       Expected Discharge Date Expected Discharge Time    Aug 4, 2023            Demographic Summary       Row Name 08/01/23 1529       General Information    Admission Type observation    Arrived From emergency  department    Referral Source admission list    Reason for Consult discharge planning    Preferred Language English                   Functional Status       Row Name 08/01/23 1529       Functional Status    Usual Activity Tolerance good    Current Activity Tolerance good       Functional Status, IADL    Medications assistive person    Meal Preparation assistive person    Housekeeping assistive person    Laundry assistive person    Shopping assistive person       Mental Status    General Appearance WDL WDL       Mental Status Summary    Recent Changes in Mental Status/Cognitive Functioning no changes       Employment/    Employment Status retired                   Psychosocial    No documentation.                  Abuse/Neglect    No documentation.                  Legal    No documentation.                  Substance Abuse    No documentation.                  Patient Forms    No documentation.

## 2023-08-01 NOTE — H&P
History and physical    Primary care physician      Chief complaint  Generalized weakness    History of present illness  89-year-old white female with very complex past medical history and well-known to our service including ITP chronic low back pain with L5 vertebral body fracture degenerative disease paroxysmal atrial fibrillation DVT diabetes hypertension hyperlipidemia hypothyroidism congestive heart failure and chronic kidney disease stage III who lives at home with her daughter brought to the emergency room with generalized weakness and bruises all over.  Patient work-up in ER revealed worsening thrombocytopenia admitted for management.  Patient denies any fever chills chest pain shortness of breath abdominal pain nausea vomiting diarrhea.  Patient is DNR per her wishes.    PAST MEDICAL HISTORY   Back pain      CKD (chronic kidney disease)      Diverticulosis      Exertional shortness of breath      Heart disease      Hyperthyroidism      L5 compression fracture (HCC) 08/2022    Left ventricular hypertrophy      Liver disease      Low back pain      Mitral regurgitation      Osteoarthritis of hip      Osteoporosis      Peripheral neuropathy      Premature ventricular contractions      Renal insufficiency syndrome      Type 2 diabetes mellitus      Uterine cancer        PAST SURGICAL HISTORY              Procedure Laterality Date    AORTIC VALVE REPAIR/REPLACEMENT        APPENDECTOMY        CATARACT EXTRACTION         1970, 1999    CHOLECYSTECTOMY        COLONOSCOPY        ENDOSCOPY   08/15/2014     no gross lesions in stomach/duodenum, erythrematous mucosa in stomach    EPIDURAL BLOCK        HYSTERECTOMY   2007    STERNOTOMY        UPPER GASTROINTESTINAL ENDOSCOPY             FAMILY HISTORY           Problem Relation Age of Onset    Heart disease Mother      Hypertension Mother      Stroke Mother      Diabetes Mother           mellitus    Other Other           cardiovascular disorder      SOCIAL  "HISTORY                 Socioeconomic History    Marital status:    Tobacco Use    Smoking status: Former       Packs/day: 0.50       Types: Cigarettes       Quit date: 7/10/1969       Years since quittin.0    Smokeless tobacco: Never   Vaping Use    Vaping Use: Never used   Substance and Sexual Activity    Alcohol use: No       Comment: caffeine use - coffee 2 cups daily    Drug use: No    Sexual activity: Defer         ALLERGIES  Baclofen, Erythromycin, Statins, Cephalexin, Penicillins, and Sulfa antibiotics  Home medications reviewed     REVIEW OF SYSTEMS  Constitutional:  Negative for chills and fever.   HENT:  Negative for ear pain and sore throat.    Respiratory:  Negative for cough and shortness of breath.    Cardiovascular:  Negative for chest pain and palpitations.   Gastrointestinal:  Negative for abdominal pain and vomiting.   Genitourinary:  Negative for dysuria and hematuria.   Musculoskeletal:  Negative for arthralgias and joint swelling.   Skin:  Positive for rash. Negative for pallor.   Neurological:  Negative for numbness and headaches.   Hematological:  Bruises/bleeds easily.   Psychiatric/Behavioral:  Negative for confusion and hallucinations.        PHYSICAL EXAM  Blood pressure 154/69, pulse 64, temperature 97.7 øF (36.5 øC), temperature source Oral, resp. rate 16, height 144.8 cm (57.01\"), weight 77.9 kg (171 lb 11.8 oz), SpO2 95 %.    Constitutional:       General: She is not in acute distress.     Appearance: She is well-developed.   HENT:      Head: Normocephalic and atraumatic.   Eyes:      Extraocular Movements: Extraocular movements intact.   Cardiovascular:      Rate and Rhythm: Normal rate and regular rhythm.      Heart sounds: Normal heart sounds.   Pulmonary:      Effort: Pulmonary effort is normal.      Breath sounds: Normal breath sounds.   Abdominal:      General: There is no distension.      Comments: Erythematous macular clusters noted along the left flank "   Genitourinary:     Comments: Indwelling Hoffman catheter  Skin:     General: Skin is warm.   Neurological:      General: No focal deficit present.      Mental Status: She is alert and oriented to person, place, and time.   Psychiatric:         Mood and Affect: Mood normal.      LAB RESULTS  Lab Results (last 24 hours)       Procedure Component Value Units Date/Time    BNP [[id number]]  (Abnormal) Collected: 08/01/23 0934    Specimen: Blood Updated: 08/01/23 1318     proBNP 2,234.0 pg/mL     Narrative:      Among patients with dyspnea, NT-proBNP is highly sensitive for the detection of acute congestive heart failure. In addition NT-proBNP of <300 pg/ml effectively rules out acute congestive heart failure with 99% negative predictive value.    Results may be falsely decreased if patient taking Biotin.      Vitamin B12 [[id number]]  (Normal) Collected: 08/01/23 0934    Specimen: Blood Updated: 08/01/23 1133     Vitamin B-12 287 pg/mL     Narrative:      Results may be falsely increased if patient taking Biotin.      Folate [[id number]]  (Normal) Collected: 08/01/23 0934    Specimen: Blood Updated: 08/01/23 1133     Folate 14.50 ng/mL     Narrative:      Results may be falsely increased if patient taking Biotin.      Immature Platelet Fraction [[id number]]  (Abnormal) Collected: 08/01/23 0934    Specimen: Blood Updated: 08/01/23 1003     IPF 15.30 %      Platelets 16 10*3/mm3     CBC & Differential [[id number]]  (Abnormal) Collected: 08/01/23 0934    Specimen: Blood Updated: 08/01/23 1003    Narrative:      The following orders were created for panel order CBC & Differential.  Procedure                               Abnormality         Status                     ---------                               -----------         ------                     CBC Auto Differential[[id number]]        Abnormal            Final result                 Please view results for these tests on the individual orders.    CBC Auto Differential  [[id number]]  (Abnormal) Collected: 08/01/23 0934    Specimen: Blood Updated: 08/01/23 1003     WBC 6.27 10*3/mm3      RBC 4.25 10*6/mm3      Hemoglobin 11.8 g/dL      Hematocrit 35.9 %      MCV 84.5 fL      MCH 27.8 pg      MCHC 32.9 g/dL      RDW 16.9 %      RDW-SD 51.5 fl      Platelets 16 10*3/mm3      Neutrophil % 86.4 %      Lymphocyte % 11.3 %      Monocyte % 1.3 %      Eosinophil % 0.0 %      Basophil % 0.2 %      Neutrophils, Absolute 5.42 10*3/mm3      Lymphocytes, Absolute 0.71 10*3/mm3      Monocytes, Absolute 0.08 10*3/mm3      Eosinophils, Absolute 0.00 10*3/mm3      Basophils, Absolute 0.01 10*3/mm3     Methylmalonic Acid, Serum [[id number]] Collected: 08/01/23 0934    Specimen: Blood Updated: 08/01/23 0949    POC Glucose Once [[id number]]  (Normal) Collected: 07/31/23 2329    Specimen: Blood Updated: 07/31/23 2330     Glucose 103 mg/dL      Comment: RN Notified R and V Meter: [id number] : serena Perez RN       CBC & Differential [[id number]]  (Abnormal) Collected: 07/31/23 1920    Specimen: Blood Updated: 07/31/23 2009    Narrative:      The following orders were created for panel order CBC & Differential.  Procedure                               Abnormality         Status                     ---------                               -----------         ------                     CBC Auto Differential[[id number]]        Abnormal            Final result               Scan Slide[[id number]]                   Normal              Final result                 Please view results for these tests on the individual orders.    CBC Auto Differential [[id number]]  (Abnormal) Collected: 07/31/23 1920    Specimen: Blood Updated: 07/31/23 2009     WBC 8.13 10*3/mm3      RBC 4.30 10*6/mm3      Hemoglobin 12.1 g/dL      Hematocrit 35.9 %      MCV 83.5 fL      MCH 28.1 pg      MCHC 33.7 g/dL      RDW 17.0 %      RDW-SD 50.6 fl      MPV 10.6 fL      Platelets 17 10*3/mm3      Neutrophil % 71.5 %       Lymphocyte % 19.9 %      Monocyte % 7.4 %      Eosinophil % 0.1 %      Basophil % 0.1 %      Neutrophils, Absolute 5.81 10*3/mm3      Lymphocytes, Absolute 1.62 10*3/mm3      Monocytes, Absolute 0.60 10*3/mm3      Eosinophils, Absolute 0.01 10*3/mm3      Basophils, Absolute 0.01 10*3/mm3     Scan Slide [[id number]]  (Normal) Collected: 07/31/23 1920    Specimen: Blood Updated: 07/31/23 2009     RBC Morphology Normal     WBC Morphology Normal     Platelet Morphology Normal    Comprehensive Metabolic Panel [[id number]]  (Abnormal) Collected: 07/31/23 1920    Specimen: Blood Updated: 07/31/23 1956     Glucose 98 mg/dL      BUN 38 mg/dL      Creatinine 1.85 mg/dL      Sodium 128 mmol/L      Potassium 4.0 mmol/L      Chloride 91 mmol/L      CO2 25.1 mmol/L      Calcium 9.0 mg/dL      Total Protein 6.1 g/dL      Albumin 3.6 g/dL      ALT (SGPT) 14 U/L      AST (SGOT) 17 U/L      Alkaline Phosphatase 33 U/L      Total Bilirubin 0.3 mg/dL      Globulin 2.5 gm/dL      A/G Ratio 1.4 g/dL      BUN/Creatinine Ratio 20.5     Anion Gap 11.9 mmol/L      eGFR 25.3 mL/min/1.73     Narrative:      GFR Normal >60  Chronic Kidney Disease <60  Kidney Failure <15    The GFR formula is only valid for adults with stable renal function between ages 18 and 70.    Protime-INR [[id number]]  (Abnormal) Collected: 07/31/23 1920    Specimen: Blood Updated: 07/31/23 1951     Protime 14.8 Seconds      INR 1.15    aPTT [[id number]]  (Normal) Collected: 07/31/23 1920    Specimen: Blood Updated: 07/31/23 1951     PTT 25.1 seconds           Imaging Results (Last 24 Hours)       Procedure Component Value Units Date/Time    XR Spine Lumbar Complete 4+VW [[id number]] Collected: 07/31/23 1940     Updated: 07/31/23 1946    Narrative:      XR SPINE LUMBAR COMPLETE 4+VW-  07/31/2023     HISTORY: L5 fracture with pain.     There is a compression deformity of L5 which appears similar to the  06/15/2023 study. There is L1-L2, L2-3 and L3-4 moderately severe  disc  space narrowing.     No acute fractures are seen. There is some aortic calcification.  Hypertrophic changes are seen. The facet joints appear relatively  well-maintained.       Impression:      1. Compression deformity of L5 with approximately 35% loss of the  vertebral body height. This does not appear acute and appears unchanged  from the 06/15/2023 study.  2. No acute fractures are seen.     This report was finalized on 7/31/2023 7:43 PM by Dr. Herbie Pike M.D.               Current Facility-Administered Medications:     budesonide-formoterol (SYMBICORT) 160-4.5 MCG/ACT inhaler 2 puff, 2 puff, Inhalation, BID, Fred Jeffrey MD, 2 puff at 08/01/23 0847    carvedilol (COREG) tablet 12.5 mg, 12.5 mg, Oral, BID With Meals, Fred Jeffrey MD    cholecalciferol (VITAMIN D3) tablet 1,000 Units, 1,000 Units, Oral, Daily, Fred Jeffrey MD    dextrose (D50W) (25 g/50 mL) IV injection 25 g, 25 g, Intravenous, Q15 Min PRN, Mango Arnold MD    dextrose (GLUTOSE) oral gel 15 g, 15 g, Oral, Q15 Min PRN, Mango Arnold MD    gabapentin (NEURONTIN) capsule 600 mg, 600 mg, Oral, Q12H, Fred Jeffrey MD, 600 mg at 08/01/23 1255    glucagon (GLUCAGEN) injection 1 mg, 1 mg, Intramuscular, Q15 Min PRN, Mango Arnold MD    guaiFENesin (MUCINEX) 12 hr tablet 600 mg, 600 mg, Oral, Q12H, Mango Arnold MD, 600 mg at 08/01/23 1256    hydrALAZINE (APRESOLINE) tablet 50 mg, 50 mg, Oral, TID, Fred Jeffrey MD, 50 mg at 08/01/23 1256    HYDROcodone-acetaminophen (NORCO) 7.5-325 MG per tablet 1 tablet, 1 tablet, Oral, Q4H PRN, Mango Arnold MD    insulin glargine (LANTUS, SEMGLEE) injection 15 Units, 15 Units, Subcutaneous, Nightly, Mango Arnold MD    insulin lispro (HUMALOG/ADMELOG) injection 2-7 Units, 2-7 Units, Subcutaneous, 4x Daily AC & at Bedtime, Mango Arnold MD    insulin lispro (HUMALOG/ADMELOG) injection 5 Units, 5 Units, Subcutaneous, TID With Meals, Mango Arnold MD, 5 Units at 08/01/23 1256     levothyroxine (SYNTHROID, LEVOTHROID) tablet 25 mcg, 25 mcg, Oral, QAM, Fred Jeffrey MD, 25 mcg at 08/01/23 0603    LORazepam (ATIVAN) tablet 1 mg, 1 mg, Oral, Q PM, Fred Jeffrey MD    methylPREDNISolone sodium succinate (SOLU-Medrol) injection 80 mg, 80 mg, Intravenous, Daily, Tomer Jeong MD, 80 mg at 08/01/23 1256    montelukast (SINGULAIR) tablet 10 mg, 10 mg, Oral, Nightly, Fred Jeffrey MD, 10 mg at 08/01/23 0602    ondansetron (ZOFRAN) injection 4 mg, 4 mg, Intravenous, Q6H PRN, Fred Jeffrey MD    pantoprazole (PROTONIX) EC tablet 40 mg, 40 mg, Oral, Q AM, Fred Jeffrey MD, 40 mg at 08/01/23 0602    tamsulosin (FLOMAX) 24 hr capsule 0.4 mg, 0.4 mg, Oral, Daily, Fred Jeffrey MD, 0.4 mg at 08/01/23 1256     ASSESSMENT  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  Chronic anemia  Congestive heart failure  Diabetes mellitus  Hypertension  Hypothyroidism  Paroxysmal atrial fibrillation  Pulmonary hypertension  Aortic stenosis  Degenerative disc disease  Compression fracture L5 vertebra  Chronic kidney disease stage IV  Chronic kidney retention with Hoffman catheter    PLAN  Admit  IV steroids  Hematology consult  May need IVIG  Adjust home medications  Stress ulcer DVT prophylaxis  Supportive care  PT OT  DNR  Discussed with family nursing staff  Follow closely and further recommendation current hospital course    KADEEM RIVAS MD

## 2023-08-01 NOTE — ED NOTES
Nursing report ED to floor  Helena Carmen  89 y.o.  female    HPI :   Chief Complaint   Patient presents with    Abnormal Lab       Admitting doctor:   Fred Jeffrey MD    Admitting diagnosis:   The primary encounter diagnosis was Uncontrolled hypertension. Diagnoses of Thrombocytopenia, Hyponatremia, History of ITP, and Rash (left flank) were also pertinent to this visit.    Code status:   Current Code Status       Date Active Code Status Order ID Comments User Context       Prior            Allergies:   Baclofen, Erythromycin, Statins, Cephalexin, Penicillins, and Sulfa antibiotics    Isolation:   No active isolations    Intake and Output    Intake/Output Summary (Last 24 hours) at 8/1/2023 0005  Last data filed at 7/31/2023 2341  Gross per 24 hour   Intake --   Output 1600 ml   Net -1600 ml       Weight:       07/31/23  1800   Weight: 76.2 kg (168 lb)       Most recent vitals:   Vitals:    07/31/23 2155 07/31/23 2200 07/31/23 2233 07/31/23 2301   BP: (!) 187/70 (!) 182/65 (!) 190/68 177/61   Pulse: 59 58 57 61   Resp:    16   Temp:       TempSrc:       SpO2: 99% 99% 99% 98%   Weight:       Height:           Active LDAs/IV Access:   Lines, Drains & Airways       Active LDAs       Name Placement date Placement time Site Days    Peripheral IV 07/31/23 1915 Left;Posterior Forearm 07/31/23 1915  Forearm  less than 1    Urethral Catheter 06/14/23  according to patient and daughter at bedside  --  -- 48                    Labs (abnormal labs have a star):   Labs Reviewed   COMPREHENSIVE METABOLIC PANEL - Abnormal; Notable for the following components:       Result Value    BUN 38 (*)     Creatinine 1.85 (*)     Sodium 128 (*)     Chloride 91 (*)     Alkaline Phosphatase 33 (*)     eGFR 25.3 (*)     All other components within normal limits    Narrative:     GFR Normal >60  Chronic Kidney Disease <60  Kidney Failure <15    The GFR formula is only valid for adults with stable renal function between ages 18 and 70.    PROTIME-INR - Abnormal; Notable for the following components:    Protime 14.8 (*)     INR 1.15 (*)     All other components within normal limits   CBC WITH AUTO DIFFERENTIAL - Abnormal; Notable for the following components:    RDW 17.0 (*)     Platelets 17 (*)     Eosinophil % 0.1 (*)     All other components within normal limits   APTT - Normal   SCAN SLIDE - Normal   POCT GLUCOSE FINGERSTICK - Normal   CBC AND DIFFERENTIAL    Narrative:     The following orders were created for panel order CBC & Differential.  Procedure                               Abnormality         Status                     ---------                               -----------         ------                     CBC Auto Differential[[id number]]        Abnormal            Final result               Scan Slide[[id number]]                   Normal              Final result                 Please view results for these tests on the individual orders.       EKG:   No orders to display       Meds given in ED:   Medications   montelukast (SINGULAIR) tablet 10 mg (has no administration in time range)   budesonide-formoterol (SYMBICORT) 160-4.5 MCG/ACT inhaler 2 puff (has no administration in time range)   guaiFENesin (MUCINEX) 12 hr tablet 600 mg (600 mg Oral Given 7/31/23 2311)   hydrALAZINE (APRESOLINE) tablet 100 mg (100 mg Oral Given 7/31/23 2311)   famotidine (PEPCID) tablet 40 mg (40 mg Oral Given 7/31/23 2311)   furosemide (LASIX) tablet 40 mg (40 mg Oral Given 7/31/23 2308)   carvedilol (COREG) tablet 12.5 mg (12.5 mg Oral Given 7/31/23 2308)   insulin glargine (LANTUS, SEMGLEE) injection 25 Units (25 Units Subcutaneous Given 7/31/23 2336)   LORazepam (ATIVAN) tablet 0.5 mg (0.5 mg Oral Given 7/31/23 2308)   HYDROcodone-acetaminophen (NORCO) 7.5-325 MG per tablet 1 tablet (1 tablet Oral Given 7/31/23 2311)   gabapentin (NEURONTIN) capsule 600 mg (600 mg Oral Given 7/31/23 2308)   methylPREDNISolone sodium succinate (SOLU-Medrol) injection 125  mg (125 mg Intravenous Given 23 4444)       Imaging results:  XR Spine Lumbar Complete 4+VW    Result Date: 2023  1. Compression deformity of L5 with approximately 35% loss of the vertebral body height. This does not appear acute and appears unchanged from the 06/15/2023 study. 2. No acute fractures are seen.  This report was finalized on 2023 7:43 PM by Dr. Herbie Pike M.D.       Ambulatory status:   - with assistance    Social issues:   Social History     Socioeconomic History    Marital status:    Tobacco Use    Smoking status: Former     Packs/day: 0.50     Types: Cigarettes     Quit date: 7/10/1969     Years since quittin.0    Smokeless tobacco: Never   Vaping Use    Vaping Use: Never used   Substance and Sexual Activity    Alcohol use: No     Comment: caffeine use - coffee 2 cups daily    Drug use: No    Sexual activity: Defer       NIH Stroke Scale:       Tessie Perez RN  23 00:05 EDT

## 2023-08-01 NOTE — ED NOTES
"Patient's daughter requesting that her mother not be admitted to dr. Gonzalez and would like someone from \"Dr. Anguiano's team\"  "

## 2023-08-01 NOTE — CONSULTS
Subjective     REASON FOR CONSULTATION:  Provide an opinion on any further workup or treatment on:    Thrombocytopenia                       REQUESTING PHYSICIAN: Dr. Arnold    HISTORY OF PRESENT ILLNESS:      Helena Carmen is a 89 y.o. patient who was admitted on 7/31/2023.  She has thrombocytopenia and follows with Dr. Sherman at our office.  Last visit was on 5/23/2023.  She was continued at that point on prednisone at 10 mg daily with a goal of keeping platelet count in the 40,000-50,000 range.      Patient has history of bilateral calf DVTs.  She was previously on anticoagulation with Eliquis.  Due to her platelet count, she has been kept off anticoagulation.  She was having a CBC monitored weekly at home health and the result was faxed to our office.     Patient presented to the ER on 7/31/2023 after she was found by her granddaughter to have new area of bruising around her abdomen.  There was no preceding fall or trauma.  Most recent platelet count was 24,000. She did not notice blood in the urine or stool. She was advised to go to the ER for further evaluation.  In the ER, platelet count was 17,000.    Of note is that the patient had a recent UTI.  Granddaughter noticed bad odor to the urine.  She was treated with Levaquin.     Past Medical History:   Diagnosis Date    Aortic valve stenosis     s/p tissue AVR    Back pain     CKD (chronic kidney disease)     Colitis due to Clostridioides difficile 01/26/2022    Diastolic dysfunction     Grade 2 per echocardiogram 2013    Diverticulosis     Exertional shortness of breath     Heart disease     Hiatal hernia     Hyperlipidemia     Hypertension     Hyperthyroidism     Hypertriglyceridemia 05/31/2018    Hypothyroidism     L5 compression fracture (HCC) 08/2022    Left ventricular hypertrophy     Legally blind     Liver disease     Low back pain     Macular degeneration     Mitral regurgitation     Osteoarthritis of hip     Osteoporosis     Pancreatitis 01/26/2022     Paroxysmal atrial fibrillation     Peripheral neuropathy     Premature ventricular contractions     Pulmonary hypertension     Renal insufficiency syndrome     Type 2 diabetes mellitus     Uterine cancer      Past Surgical History:   Procedure Laterality Date    AORTIC VALVE REPAIR/REPLACEMENT      APPENDECTOMY      CATARACT EXTRACTION      ,     CHOLECYSTECTOMY      COLONOSCOPY      ENDOSCOPY  08/15/2014    no gross lesions in stomach/duodenum, erythrematous mucosa in stomach    EPIDURAL BLOCK      HYSTERECTOMY  2007    STERNOTOMY      UPPER GASTROINTESTINAL ENDOSCOPY       SCHEDULED MEDS:  budesonide-formoterol, 2 puff, Inhalation, BID  carvedilol, 12.5 mg, Oral, BID With Meals  cholecalciferol, 1,000 Units, Oral, Daily  furosemide, 40 mg, Oral, BID  gabapentin, 600 mg, Oral, Q12H  hydrALAZINE, 50 mg, Oral, TID  HYDROcodone-acetaminophen, 1 tablet, Oral, 5x Daily  insulin glargine, 25 Units, Subcutaneous, Nightly  levothyroxine, 25 mcg, Oral, QAM  LORazepam, 1 mg, Oral, Q PM  montelukast, 10 mg, Oral, Nightly  pantoprazole, 40 mg, Oral, Q AM  tamsulosin, 0.4 mg, Oral, Daily      INFUSIONS:     PRN MEDS:    ondansetron    ALLERGIES:  Allergies   Allergen Reactions    Baclofen Unknown - Low Severity     Yeast infection    Erythromycin Unknown (See Comments) and Other (See Comments)     Pt states she does not remember but it was many years ago  Pt states she does not remember but it was many years ago    Statins Myalgia    Cephalexin Other (See Comments) and Unknown (See Comments)     2022 Pt has tolerated ceftriaxone and cefepime during admission.   Pt states she does not remember reaction but it was many years ago    Penicillins Rash    Sulfa Antibiotics Itching and Rash      Social History     Socioeconomic History    Marital status:    Tobacco Use    Smoking status: Former     Packs/day: 0.50     Types: Cigarettes     Quit date: 7/10/1969     Years since quittin.0    Smokeless tobacco:  Never   Vaping Use    Vaping Use: Never used   Substance and Sexual Activity    Alcohol use: No     Comment: caffeine use - coffee 2 cups daily    Drug use: No    Sexual activity: Defer     Family History   Problem Relation Age of Onset    Heart disease Mother     Hypertension Mother     Stroke Mother     Diabetes Mother         mellitus    Other Other         cardiovascular disorder      REVIEW OF SYSTEMS:   GENERAL: Generalized weakness.   SKIN: Negative.  HEME/LYMPH: Easy bruising.  EYES:  Negative.  ENT:  Negative.  RESPIRATORY:  Negative.   CVS:  Negative.   GI:  Negative.   : Recent UTI.   MUSCULOSKELETAL:  Negative.  NEUROLOGICAL:  Negative.  PSYCHIATRIC:  Negative.     Objective   VITAL SIGNS:  Temp:  [97.3 øF (36.3 øC)-97.8 øF (36.6 øC)] 97.3 øF (36.3 øC)  Heart Rate:  [56-67] 56  Resp:  [15-16] 15  BP: (138-193)/(51-70) 159/51     Wt Readings from Last 3 Encounters:   08/01/23 77.9 kg (171 lb 11.8 oz)   07/11/23 73.9 kg (163 lb)   06/15/23 78.7 kg (173 lb 8 oz)     PHYSICAL EXAMINATION:   GENERAL:  The patient appears in fair general condition, not in acute distress.  SKIN: No skin rash.  Ecchymosis over forearms and left side of the abdomen.  HEAD:  Normocephalic.  EYES:  No Jaundice. No Pallor. Pupils equal. EOMI.  NECK:  Supple. No Thyromegaly. No Masses.  LYMPHATICS:  No cervical or supraclavicular lymphadenopathy.  CHEST: Normal respiratory effort. Lungs clear to auscultation.   CARDIAC:  Normal S1 & S2. No murmur.   ABDOMEN:  Soft. No tenderness. No Hepatomegaly. No Splenomegaly. No masses.  EXTREMITIES: No edema. No Calf tenderness.  NEUROLOGICAL:  No Focal neurological deficits.     RESULT REVIEW:   Results from last 7 days   Lab Units 08/01/23  0934 07/31/23 1920   WBC 10*3/mm3 6.27 8.13   NEUTROS ABS 10*3/mm3 5.42 5.81   HEMOGLOBIN g/dL 11.8* 12.1   HEMATOCRIT % 35.9 35.9   PLATELETS 10*3/mm3 16*  16* 17*     Results from last 7 days   Lab Units 07/31/23 1920   SODIUM mmol/L 128*   POTASSIUM  mmol/L 4.0   CHLORIDE mmol/L 91*   CO2 mmol/L 25.1   BUN mg/dL 38*   CREATININE mg/dL 1.85*   CALCIUM mg/dL 9.0   ALBUMIN g/dL 3.6   BILIRUBIN mg/dL 0.3   ALK PHOS U/L 33*   ALT (SGPT) U/L 14   AST (SGOT) U/L 17     Results from last 7 days   Lab Units 07/31/23  1920   INR  1.15*   APTT seconds 25.1     Component      Latest Ref Rng 8/1/2023   IPF      0.90 - 6.50 % 15.30 (H)       Component      Latest Ref Rng 8/27/2022 3/12/2023 8/1/2023   Iron      37 - 145 mcg/dL  135     Iron Saturation (TSAT)      20 - 50 %  51 (H)     Transferrin      200 - 360 mg/dL  177 (L)     TIBC      298 - 536 mcg/dL  264 (L)     Ferritin      13.00 - 150.00 ng/mL  295.00 (H)     Vitamin B-12      211 - 946 pg/mL 417  236  287    Folate      4.78 - 24.20 ng/mL 6.69  5.94  14.50      Assessment & Plan   *Severe thrombocytopenia.  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  She has chronic liver disease which is likely contributing.  Spleen was not enlarged on CT on 5/1/2023.  She was found to have a new bruise over the abdomen with no preceding trauma.  7/31/2023: Platelet count 17,000.  Patient was given Solu-Medrol 125 mg IV.  8/1/2023: Platelet count 16,000.  IPF: 15.3%.  7/31/2023: PT and PTT were normal.    *Vitamin B12 deficiency.  Vitamin B12 was 236 on 3/12/2023.  She has not been taking vitamin B12.  8/1/2023: Vitamin B12 287.    *Mild folate deficiency.  Folate level was 5.94 on 3/12/2023.  8/1/2023: Folate 14.5.     *Chronic kidney disease, stage 4.  7/31/2023: Creatinine 1.85.     PLAN:    1.  Continue IV Solu-Medrol daily.  2.  Start vitamin B12 1000 mcg IM daily.  3.  Daily CBC.    4.  Discussed case with Dr. Sherman.  If there is no improvement in her thrombocytopenia, we would recommend treatment with Rituxan with the first treatment to be given during this hospital stay.      Tomre Jeong MD  08/01/23

## 2023-08-01 NOTE — PLAN OF CARE
Problem: Adult Inpatient Plan of Care  Goal: Plan of Care Review  Outcome: Ongoing, Progressing  Flowsheets (Taken 8/1/2023 1747)  Plan of Care Reviewed With:   patient   family  Goal: Patient-Specific Goal (Individualized)  Outcome: Ongoing, Progressing  Goal: Absence of Hospital-Acquired Illness or Injury  Outcome: Ongoing, Progressing  Intervention: Identify and Manage Fall Risk  Recent Flowsheet Documentation  Taken 8/1/2023 1600 by Herbie Dickens RN  Safety Promotion/Fall Prevention:   assistive device/personal items within reach   activity supervised   elopement precautions   fall prevention program maintained   gait belt   lighting adjusted   mobility aid in reach   muscle strengthening facilitated   nonskid shoes/slippers when out of bed   room organization consistent   safety round/check completed   clutter free environment maintained  Taken 8/1/2023 1400 by Herbie Dickens RN  Safety Promotion/Fall Prevention:   activity supervised   assistive device/personal items within reach   clutter free environment maintained   fall prevention program maintained   gait belt   lighting adjusted   mobility aid in reach   muscle strengthening facilitated   nonskid shoes/slippers when out of bed   room organization consistent   safety round/check completed  Taken 8/1/2023 1200 by Herbie Dickens RN  Safety Promotion/Fall Prevention:   activity supervised   assistive device/personal items within reach   clutter free environment maintained   fall prevention program maintained   gait belt   lighting adjusted   mobility aid in reach   muscle strengthening facilitated   nonskid shoes/slippers when out of bed   room organization consistent   safety round/check completed  Taken 8/1/2023 1000 by Herbie Dickens RN  Safety Promotion/Fall Prevention:   activity supervised   assistive device/personal items within reach   clutter free environment maintained   fall prevention program maintained   gait belt   lighting  adjusted   mobility aid in reach   muscle strengthening facilitated   nonskid shoes/slippers when out of bed   room organization consistent   safety round/check completed  Taken 8/1/2023 0800 by Herbie Dickens RN  Safety Promotion/Fall Prevention:   activity supervised   assistive device/personal items within reach   clutter free environment maintained   fall prevention program maintained   gait belt   lighting adjusted   mobility aid in reach   muscle strengthening facilitated   nonskid shoes/slippers when out of bed   room organization consistent   safety round/check completed  Intervention: Prevent Skin Injury  Recent Flowsheet Documentation  Taken 8/1/2023 0800 by Herbie Dickens RN  Body Position: supine  Intervention: Prevent and Manage VTE (Venous Thromboembolism) Risk  Recent Flowsheet Documentation  Taken 8/1/2023 0800 by Herbie Dickens RN  Activity Management: bedrest  Goal: Optimal Comfort and Wellbeing  Outcome: Ongoing, Progressing  Intervention: Provide Person-Centered Care  Recent Flowsheet Documentation  Taken 8/1/2023 0800 by Herbie Dickens RN  Trust Relationship/Rapport:   care explained   choices provided   questions answered   questions encouraged  Goal: Readiness for Transition of Care  Outcome: Ongoing, Progressing     Problem: Skin Injury Risk Increased  Goal: Skin Health and Integrity  Outcome: Ongoing, Progressing  Intervention: Optimize Skin Protection  Recent Flowsheet Documentation  Taken 8/1/2023 0800 by Herbie Dickens RN  Head of Bed (HOB) Positioning: HOB elevated     Problem: Diabetes Comorbidity  Goal: Blood Glucose Level Within Targeted Range  Outcome: Ongoing, Progressing  Intervention: Monitor and Manage Glycemia  Recent Flowsheet Documentation  Taken 8/1/2023 0800 by Herbie Dickens RN  Glycemic Management: blood glucose monitored     Problem: Heart Failure Comorbidity  Goal: Maintenance of Heart Failure Symptom Control  Outcome: Ongoing,  Progressing  Intervention: Maintain Heart Failure-Management  Recent Flowsheet Documentation  Taken 8/1/2023 0800 by Herbie Dickens RN  Medication Review/Management: medications reviewed     Problem: Hypertension Comorbidity  Goal: Blood Pressure in Desired Range  Outcome: Ongoing, Progressing  Intervention: Maintain Blood Pressure Management  Recent Flowsheet Documentation  Taken 8/1/2023 0800 by Herbie Dickens RN  Medication Review/Management: medications reviewed     Problem: Pain Chronic (Persistent) (Comorbidity Management)  Goal: Acceptable Pain Control and Functional Ability  Outcome: Ongoing, Progressing  Intervention: Manage Persistent Pain  Recent Flowsheet Documentation  Taken 8/1/2023 0800 by Herbie Dickens RN  Medication Review/Management: medications reviewed   Goal Outcome Evaluation:  Plan of Care Reviewed With: patient, family

## 2023-08-01 NOTE — ED NOTES
Nursing report ED to floor  Helena Carmen  89 y.o.  female    HPI (triage note):   Chief Complaint   Patient presents with    Abnormal Lab       Admitting doctor:   No admitting provider for patient encounter.    Admitting diagnosis:   The primary encounter diagnosis was Uncontrolled hypertension. Diagnoses of Thrombocytopenia, Hyponatremia, History of ITP, and Rash (left flank) were also pertinent to this visit.    Code status:   Current Code Status       Date Active Code Status Order ID Comments User Context       Prior            Allergies:   Baclofen, Erythromycin, Statins, Cephalexin, Penicillins, and Sulfa antibiotics    Past Medical History:  Past Medical History:   Diagnosis Date    Aortic valve stenosis     s/p tissue AVR    Back pain     CKD (chronic kidney disease)     Colitis due to Clostridioides difficile 01/26/2022    Diastolic dysfunction     Grade 2 per echocardiogram 2013    Diverticulosis     Exertional shortness of breath     Heart disease     Hiatal hernia     Hyperlipidemia     Hypertension     Hyperthyroidism     Hypertriglyceridemia 05/31/2018    Hypothyroidism     L5 compression fracture (HCC) 08/2022    Left ventricular hypertrophy     Legally blind     Liver disease     Low back pain     Macular degeneration     Mitral regurgitation     Osteoarthritis of hip     Osteoporosis     Pancreatitis 01/26/2022    Paroxysmal atrial fibrillation     Peripheral neuropathy     Premature ventricular contractions     Pulmonary hypertension     Renal insufficiency syndrome     Type 2 diabetes mellitus     Uterine cancer         Weight:       07/31/23  1800   Weight: 76.2 kg (168 lb)       Most recent vitals:   Vitals:    07/31/23 1953 07/31/23 2000 07/31/23 2015 07/31/23 2046   BP: (!) 193/66      Pulse: 62 61 59 59   Resp:       Temp:       TempSrc:       SpO2: 100% 100% 100% 98%   Weight:       Height:           Active LDAs/IV Access:   Lines, Drains & Airways       Active LDAs       Name Placement  date Placement time Site Days    Peripheral IV 07/31/23 1915 Left;Posterior Forearm 07/31/23 1915  Forearm  less than 1    Urethral Catheter 06/14/23  according to patient and daughter at bedside  --  -- 47                    Labs (abnormal labs have a star):   Labs Reviewed   COMPREHENSIVE METABOLIC PANEL - Abnormal; Notable for the following components:       Result Value    BUN 38 (*)     Creatinine 1.85 (*)     Sodium 128 (*)     Chloride 91 (*)     Alkaline Phosphatase 33 (*)     eGFR 25.3 (*)     All other components within normal limits    Narrative:     GFR Normal >60  Chronic Kidney Disease <60  Kidney Failure <15    The GFR formula is only valid for adults with stable renal function between ages 18 and 70.   PROTIME-INR - Abnormal; Notable for the following components:    Protime 14.8 (*)     INR 1.15 (*)     All other components within normal limits   CBC WITH AUTO DIFFERENTIAL - Abnormal; Notable for the following components:    RDW 17.0 (*)     Platelets 17 (*)     Eosinophil % 0.1 (*)     All other components within normal limits   APTT - Normal   SCAN SLIDE - Normal   CBC AND DIFFERENTIAL    Narrative:     The following orders were created for panel order CBC & Differential.  Procedure                               Abnormality         Status                     ---------                               -----------         ------                     CBC Auto Differential[[id number]]        Abnormal            Final result               Scan Slide[[id number]]                   Normal              Final result                 Please view results for these tests on the individual orders.       EKG:   No orders to display       Meds given in ED:   Medications - No data to display    Imaging results:  XR Spine Lumbar Complete 4+VW    Result Date: 7/31/2023  1. Compression deformity of L5 with approximately 35% loss of the vertebral body height. This does not appear acute and appears unchanged from the  06/15/2023 study. 2. No acute fractures are seen.  This report was finalized on 2023 7:43 PM by Dr. Herbie Pike M.D.       Ambulatory status:   - assist x1-2    Social issues:   Social History     Socioeconomic History    Marital status:    Tobacco Use    Smoking status: Former     Packs/day: 0.50     Types: Cigarettes     Quit date: 7/10/1969     Years since quittin.0    Smokeless tobacco: Never   Vaping Use    Vaping Use: Never used   Substance and Sexual Activity    Alcohol use: No     Comment: caffeine use - coffee 2 cups daily    Drug use: No    Sexual activity: Defer          NIH Stroke Scale:         Carloz Mayfield RN  23 20:55 EDT    Nurse Direct line for any questions: [id number]

## 2023-08-01 NOTE — DISCHARGE PLACEMENT REQUEST
"Helena Carmen (89 y.o. Female)       Date of Birth   02/20/1931    Social Security Number       Address   [address] [place] [address]    Home Phone   [phone]    MRN   [id number]       Sikhism   Adventism    Marital Status                               Admission Date   7/31/23    Admission Type   Emergency    Admitting Provider   Mango Arnold MD    Attending Provider   Mango Arnold MD    Department, Room/Bed   [address], P590/1       Discharge Date       Discharge Disposition       Discharge Destination                                 Attending Provider: Mango Arnold MD    Allergies: Baclofen, Erythromycin, Statins, Cephalexin, Penicillins, Sulfa Antibiotics    Isolation: None   Infection: None   Code Status: No CPR    Ht: 144.8 cm (57.01\")   Wt: 77.9 kg (171 lb 11.8 oz)    Admission Cmt: None   Principal Problem: Thrombocytopenia [D69.6]                   Active Insurance as of 7/31/2023       Primary Coverage       Payor Plan Insurance Group Employer/Plan Group    MEDICARE MEDICARE A & B        Payor Plan Address Payor Plan Phone Number Payor Plan Fax Number Effective Dates    [address] [phone]  2/1/1996 - None Entered    [facility] [id number]         Subscriber Name Subscriber Birth Date Member ID       HELENA CARMEN 2/20/1931 [id number]               Secondary Coverage       Payor Plan Insurance Group Employer/Plan Group     FOR LIFE  FOR LIFE  SUP         Payor Plan Address Payor Plan Phone Number Payor Plan Fax Number Effective Dates    [address] [phone]  4/5/2016 - None Entered    [facility] [id number]         Subscriber Name Subscriber Birth Date Member ID       HELENA CARMEN 2/20/1931 [id number]                     Emergency Contacts        (Rel.) Home Phone Work Phone Mobile Phone    Radha Hoyos (Daughter) [phone] -- [phone]    ARAM SPAIN (Grandchild) -- -- [phone]    " QUAN GRUBRE (Son) [phone] -- --    Margaret Loco (Daughter) [phone] -- [phone]

## 2023-08-02 LAB
ALBUMIN SERPL-MCNC: 3.3 G/DL (ref 3.5–5.2)
ALBUMIN/GLOB SERPL: 1.5 G/DL
ALP SERPL-CCNC: 29 U/L (ref 39–117)
ALT SERPL W P-5'-P-CCNC: 11 U/L (ref 1–33)
ANION GAP SERPL CALCULATED.3IONS-SCNC: 12.7 MMOL/L (ref 5–15)
AST SERPL-CCNC: 15 U/L (ref 1–32)
BASOPHILS # BLD AUTO: 0.01 10*3/MM3 (ref 0–0.2)
BASOPHILS NFR BLD AUTO: 0.1 % (ref 0–1.5)
BILIRUB SERPL-MCNC: 0.4 MG/DL (ref 0–1.2)
BUN SERPL-MCNC: 50 MG/DL (ref 8–23)
BUN/CREAT SERPL: 24.8 (ref 7–25)
CALCIUM SPEC-SCNC: 8.7 MG/DL (ref 8.2–9.6)
CHLORIDE SERPL-SCNC: 89 MMOL/L (ref 98–107)
CHOLEST SERPL-MCNC: 205 MG/DL (ref 0–200)
CO2 SERPL-SCNC: 23.3 MMOL/L (ref 22–29)
CREAT SERPL-MCNC: 2.02 MG/DL (ref 0.57–1)
DEPRECATED RDW RBC AUTO: 49.8 FL (ref 37–54)
EGFRCR SERPLBLD CKD-EPI 2021: 22.8 ML/MIN/1.73
EOSINOPHIL # BLD AUTO: 0 10*3/MM3 (ref 0–0.4)
EOSINOPHIL NFR BLD AUTO: 0 % (ref 0.3–6.2)
ERYTHROCYTE [DISTWIDTH] IN BLOOD BY AUTOMATED COUNT: 16.5 % (ref 12.3–15.4)
GLOBULIN UR ELPH-MCNC: 2.2 GM/DL
GLUCOSE BLDC GLUCOMTR-MCNC: 247 MG/DL (ref 70–130)
GLUCOSE BLDC GLUCOMTR-MCNC: 269 MG/DL (ref 70–130)
GLUCOSE BLDC GLUCOMTR-MCNC: 413 MG/DL (ref 70–130)
GLUCOSE BLDC GLUCOMTR-MCNC: 414 MG/DL (ref 70–130)
GLUCOSE SERPL-MCNC: 228 MG/DL (ref 65–99)
HBA1C MFR BLD: 6.6 % (ref 4.8–5.6)
HCT VFR BLD AUTO: 33.1 % (ref 34–46.6)
HDLC SERPL-MCNC: 50 MG/DL (ref 40–60)
HGB BLD-MCNC: 10.9 G/DL (ref 12–15.9)
LDLC SERPL CALC-MCNC: 125 MG/DL (ref 0–100)
LDLC/HDLC SERPL: 2.42 {RATIO}
LYMPHOCYTES # BLD AUTO: 0.94 10*3/MM3 (ref 0.7–3.1)
LYMPHOCYTES NFR BLD AUTO: 10.8 % (ref 19.6–45.3)
MCH RBC QN AUTO: 27.4 PG (ref 26.6–33)
MCHC RBC AUTO-ENTMCNC: 32.9 G/DL (ref 31.5–35.7)
MCV RBC AUTO: 83.2 FL (ref 79–97)
MONOCYTES # BLD AUTO: 0.49 10*3/MM3 (ref 0.1–0.9)
MONOCYTES NFR BLD AUTO: 5.6 % (ref 5–12)
NEUTROPHILS NFR BLD AUTO: 7.24 10*3/MM3 (ref 1.7–7)
NEUTROPHILS NFR BLD AUTO: 82.8 % (ref 42.7–76)
PLATELET # BLD AUTO: 28 10*3/MM3 (ref 140–450)
POTASSIUM SERPL-SCNC: 4.1 MMOL/L (ref 3.5–5.2)
PROT SERPL-MCNC: 5.5 G/DL (ref 6–8.5)
RBC # BLD AUTO: 3.98 10*6/MM3 (ref 3.77–5.28)
SODIUM SERPL-SCNC: 125 MMOL/L (ref 136–145)
TRIGL SERPL-MCNC: 171 MG/DL (ref 0–150)
TSH SERPL DL<=0.05 MIU/L-ACNC: 0.5 UIU/ML (ref 0.27–4.2)
VLDLC SERPL-MCNC: 30 MG/DL (ref 5–40)
WBC NRBC COR # BLD: 8.74 10*3/MM3 (ref 3.4–10.8)

## 2023-08-02 PROCEDURE — 63710000001 INSULIN GLARGINE PER 5 UNITS: Performed by: HOSPITALIST

## 2023-08-02 PROCEDURE — 80061 LIPID PANEL: CPT | Performed by: HOSPITALIST

## 2023-08-02 PROCEDURE — 97165 OT EVAL LOW COMPLEX 30 MIN: CPT

## 2023-08-02 PROCEDURE — 99233 SBSQ HOSP IP/OBS HIGH 50: CPT | Performed by: INTERNAL MEDICINE

## 2023-08-02 PROCEDURE — 94799 UNLISTED PULMONARY SVC/PX: CPT

## 2023-08-02 PROCEDURE — 80053 COMPREHEN METABOLIC PANEL: CPT | Performed by: HOSPITALIST

## 2023-08-02 PROCEDURE — 63710000001 INSULIN LISPRO (HUMAN) PER 5 UNITS: Performed by: HOSPITALIST

## 2023-08-02 PROCEDURE — 97530 THERAPEUTIC ACTIVITIES: CPT

## 2023-08-02 PROCEDURE — 84443 ASSAY THYROID STIM HORMONE: CPT | Performed by: HOSPITALIST

## 2023-08-02 PROCEDURE — 25010000002 METHYLPREDNISOLONE PER 125 MG: Performed by: INTERNAL MEDICINE

## 2023-08-02 PROCEDURE — 83036 HEMOGLOBIN GLYCOSYLATED A1C: CPT | Performed by: HOSPITALIST

## 2023-08-02 PROCEDURE — 25010000002 CYANOCOBALAMIN PER 1000 MCG: Performed by: INTERNAL MEDICINE

## 2023-08-02 PROCEDURE — 94664 DEMO&/EVAL PT USE INHALER: CPT

## 2023-08-02 PROCEDURE — 97162 PT EVAL MOD COMPLEX 30 MIN: CPT

## 2023-08-02 PROCEDURE — 85025 COMPLETE CBC W/AUTO DIFF WBC: CPT | Performed by: HOSPITALIST

## 2023-08-02 PROCEDURE — 82948 REAGENT STRIP/BLOOD GLUCOSE: CPT

## 2023-08-02 RX ORDER — VALACYCLOVIR HYDROCHLORIDE 500 MG/1
1000 TABLET, FILM COATED ORAL
Status: DISCONTINUED | OUTPATIENT
Start: 2023-08-02 | End: 2023-08-10 | Stop reason: HOSPADM

## 2023-08-02 RX ORDER — AMOXICILLIN 250 MG
2 CAPSULE ORAL 2 TIMES DAILY
Status: DISCONTINUED | OUTPATIENT
Start: 2023-08-02 | End: 2023-08-10 | Stop reason: HOSPADM

## 2023-08-02 RX ORDER — SODIUM CHLORIDE 9 MG/ML
50 INJECTION, SOLUTION INTRAVENOUS CONTINUOUS
Status: DISCONTINUED | OUTPATIENT
Start: 2023-08-02 | End: 2023-08-04

## 2023-08-02 RX ORDER — CARVEDILOL 6.25 MG/1
6.25 TABLET ORAL 2 TIMES DAILY WITH MEALS
Status: DISCONTINUED | OUTPATIENT
Start: 2023-08-02 | End: 2023-08-04

## 2023-08-02 RX ORDER — INSULIN LISPRO 100 [IU]/ML
7 INJECTION, SOLUTION INTRAVENOUS; SUBCUTANEOUS
Status: DISCONTINUED | OUTPATIENT
Start: 2023-08-02 | End: 2023-08-03

## 2023-08-02 RX ADMIN — BUDESONIDE AND FORMOTEROL FUMARATE DIHYDRATE 2 PUFF: 160; 4.5 AEROSOL RESPIRATORY (INHALATION) at 21:49

## 2023-08-02 RX ADMIN — TAMSULOSIN HYDROCHLORIDE 0.4 MG: 0.4 CAPSULE ORAL at 09:25

## 2023-08-02 RX ADMIN — INSULIN LISPRO 5 UNITS: 100 INJECTION, SOLUTION INTRAVENOUS; SUBCUTANEOUS at 13:40

## 2023-08-02 RX ADMIN — MONTELUKAST SODIUM 10 MG: 10 TABLET, FILM COATED ORAL at 20:46

## 2023-08-02 RX ADMIN — VALACYCLOVIR HYDROCHLORIDE 1000 MG: 500 TABLET, FILM COATED ORAL at 15:48

## 2023-08-02 RX ADMIN — SENNOSIDES AND DOCUSATE SODIUM 2 TABLET: 50; 8.6 TABLET ORAL at 20:46

## 2023-08-02 RX ADMIN — LORAZEPAM 1 MG: 1 TABLET ORAL at 17:25

## 2023-08-02 RX ADMIN — Medication 1000 UNITS: at 09:24

## 2023-08-02 RX ADMIN — CYANOCOBALAMIN 1000 MCG: 1000 INJECTION, SOLUTION INTRAMUSCULAR; SUBCUTANEOUS at 09:24

## 2023-08-02 RX ADMIN — CARVEDILOL 6.25 MG: 6.25 TABLET, FILM COATED ORAL at 17:27

## 2023-08-02 RX ADMIN — CARVEDILOL 12.5 MG: 6.25 TABLET, FILM COATED ORAL at 09:24

## 2023-08-02 RX ADMIN — SODIUM CHLORIDE 75 ML/HR: 9 INJECTION, SOLUTION INTRAVENOUS at 19:58

## 2023-08-02 RX ADMIN — INSULIN LISPRO 7 UNITS: 100 INJECTION, SOLUTION INTRAVENOUS; SUBCUTANEOUS at 17:26

## 2023-08-02 RX ADMIN — HYDRALAZINE HYDROCHLORIDE 50 MG: 50 TABLET, FILM COATED ORAL at 09:25

## 2023-08-02 RX ADMIN — GUAIFENESIN 600 MG: 600 TABLET, EXTENDED RELEASE ORAL at 20:46

## 2023-08-02 RX ADMIN — INSULIN LISPRO 7 UNITS: 100 INJECTION, SOLUTION INTRAVENOUS; SUBCUTANEOUS at 20:46

## 2023-08-02 RX ADMIN — INSULIN LISPRO 3 UNITS: 100 INJECTION, SOLUTION INTRAVENOUS; SUBCUTANEOUS at 09:25

## 2023-08-02 RX ADMIN — INSULIN GLARGINE 20 UNITS: 100 INJECTION, SOLUTION SUBCUTANEOUS at 20:46

## 2023-08-02 RX ADMIN — PANTOPRAZOLE SODIUM 40 MG: 40 TABLET, DELAYED RELEASE ORAL at 05:44

## 2023-08-02 RX ADMIN — LEVOTHYROXINE SODIUM 25 MCG: 0.03 TABLET ORAL at 06:00

## 2023-08-02 RX ADMIN — BUDESONIDE AND FORMOTEROL FUMARATE DIHYDRATE 2 PUFF: 160; 4.5 AEROSOL RESPIRATORY (INHALATION) at 07:31

## 2023-08-02 RX ADMIN — HYDRALAZINE HYDROCHLORIDE 75 MG: 50 TABLET, FILM COATED ORAL at 17:25

## 2023-08-02 RX ADMIN — GABAPENTIN 600 MG: 300 CAPSULE ORAL at 09:24

## 2023-08-02 RX ADMIN — GABAPENTIN 600 MG: 300 CAPSULE ORAL at 20:46

## 2023-08-02 RX ADMIN — INSULIN LISPRO 5 UNITS: 100 INJECTION, SOLUTION INTRAVENOUS; SUBCUTANEOUS at 09:25

## 2023-08-02 RX ADMIN — SENNOSIDES AND DOCUSATE SODIUM 2 TABLET: 50; 8.6 TABLET ORAL at 11:00

## 2023-08-02 RX ADMIN — METHYLPREDNISOLONE SODIUM SUCCINATE 80 MG: 125 INJECTION, POWDER, FOR SOLUTION INTRAMUSCULAR; INTRAVENOUS at 09:25

## 2023-08-02 RX ADMIN — GUAIFENESIN 600 MG: 600 TABLET, EXTENDED RELEASE ORAL at 09:25

## 2023-08-02 NOTE — PLAN OF CARE
Goal Outcome Evaluation:      Problem: Adult Inpatient Plan of Care  Goal: Plan of Care Review  Outcome: Ongoing, Progressing  Goal: Patient-Specific Goal (Individualized)  Outcome: Ongoing, Progressing  Goal: Absence of Hospital-Acquired Illness or Injury  Outcome: Ongoing, Progressing  Intervention: Identify and Manage Fall Risk  Recent Flowsheet Documentation  Taken 8/2/2023 0400 by Scott Díaz RN  Safety Promotion/Fall Prevention:   activity supervised   assistive device/personal items within reach   clutter free environment maintained   fall prevention program maintained   lighting adjusted   room organization consistent   safety round/check completed  Taken 8/2/2023 0200 by Scott Díaz RN  Safety Promotion/Fall Prevention:   activity supervised   clutter free environment maintained   assistive device/personal items within reach   fall prevention program maintained   lighting adjusted   room organization consistent   safety round/check completed  Taken 8/2/2023 0000 by Scott Díaz RN  Safety Promotion/Fall Prevention:   activity supervised   assistive device/personal items within reach   clutter free environment maintained   fall prevention program maintained   lighting adjusted   room organization consistent   safety round/check completed  Taken 8/1/2023 2215 by Scott Díaz RN  Safety Promotion/Fall Prevention:   activity supervised   assistive device/personal items within reach   clutter free environment maintained   fall prevention program maintained   lighting adjusted   safety round/check completed   room organization consistent  Taken 8/1/2023 2015 by Scott Díaz RN  Safety Promotion/Fall Prevention:   activity supervised   assistive device/personal items within reach   clutter free environment maintained   fall prevention program maintained   lighting adjusted   safety round/check completed   room organization consistent  Intervention: Prevent Skin Injury  Recent  Flowsheet Documentation  Taken 8/2/2023 0400 by Scott Díaz RN  Body Position:   turned   right   legs elevated  Taken 8/2/2023 0200 by Scott Díaz RN  Body Position:   turned   left   legs elevated  Taken 8/2/2023 0000 by Scott Díaz RN  Body Position:   turned   right   legs elevated  Taken 8/1/2023 2215 by Scott Díaz RN  Body Position:   turned   left   legs elevated  Taken 8/1/2023 2015 by Scott Díaz RN  Body Position:   turned   right   legs elevated  Skin Protection:   adhesive use limited   incontinence pads utilized  Intervention: Prevent and Manage VTE (Venous Thromboembolism) Risk  Recent Flowsheet Documentation  Taken 8/1/2023 2015 by Scott Díaz RN  VTE Prevention/Management:   bilateral   sequential compression devices on  Range of Motion: active ROM (range of motion) encouraged  Goal: Optimal Comfort and Wellbeing  Outcome: Ongoing, Progressing  Intervention: Monitor Pain and Promote Comfort  Recent Flowsheet Documentation  Taken 8/1/2023 2015 by Scott Díaz RN  Pain Management Interventions: see MAR  Intervention: Provide Person-Centered Care  Recent Flowsheet Documentation  Taken 8/1/2023 2015 by Scott Díaz RN  Trust Relationship/Rapport:   care explained   choices provided   emotional support provided   empathic listening provided   questions answered   questions encouraged   reassurance provided   thoughts/feelings acknowledged  Goal: Readiness for Transition of Care  Outcome: Ongoing, Progressing

## 2023-08-02 NOTE — THERAPY TREATMENT NOTE
Patient Name: Helena Carmen  : 1931    MRN: [id number]                              Today's Date: 2023       Admit Date: 2023    Visit Dx:     ICD-10-CM ICD-9-CM   1. Uncontrolled hypertension  I10 401.9   2. Thrombocytopenia  D69.6 287.5   3. Hyponatremia  E87.1 276.1   4. History of ITP  Z86.2 V12.3   5. Rash (left flank)  R21 782.1     Patient Active Problem List   Diagnosis    Aortic valve stenosis    Fatigue    MI (mitral incompetence)    PVC (premature ventricular contraction)    Legally blind    Essential hypertension    Hypertriglyceridemia    Pulmonary hypertension    Paroxysmal atrial fibrillation    Anxiety    Stage 4 chronic kidney disease    Chronic pain disorder    Degeneration of lumbar intervertebral disc    Type 2 diabetes mellitus with hyperglycemia    Esophageal reflux    Gastroparesis    Hiatal hernia    Iatrogenic hypothyroidism    Macular degeneration    Malignant neoplasm of uterus    Osteoarthritis of knee    Peripheral nerve disease    Secondary hyperparathyroidism    Thrombocytopenia    Chronic heart failure with preserved ejection fraction    Other cirrhosis of liver    Hypothyroidism    Nonrheumatic tricuspid valve regurgitation    Anemia, chronic disease    Hyponatremia    Closed fracture of fifth lumbar vertebra    Closed fracture of fifth lumbar vertebra, unspecified fracture morphology, initial encounter    Diabetic polyneuropathy associated with type 2 diabetes mellitus    Chronic ITP (idiopathic thrombocytopenia)    Chronic midline low back pain with bilateral sciatica    Acute deep vein thrombosis of calf, bilateral    Compression fracture of L5 vertebra with routine healing    Acute left-sided low back pain with left-sided sciatica    Urine retention    Rib fracture    Acute on chronic combined systolic and diastolic congestive heart failure    Acute UTI    Blindness right eye category 3, blindness left eye category 3    Cognitive communication deficit     Pseudomonas (aeruginosa) (mallei) (pseudomallei) as the cause of diseases classified elsewhere    Multifocal pneumonia     Past Medical History:   Diagnosis Date    Aortic valve stenosis     s/p tissue AVR    Back pain     CKD (chronic kidney disease)     Colitis due to Clostridioides difficile 01/26/2022    Diastolic dysfunction     Grade 2 per echocardiogram 2013    Diverticulosis     Exertional shortness of breath     Heart disease     Hiatal hernia     Hyperlipidemia     Hypertension     Hyperthyroidism     Hypertriglyceridemia 05/31/2018    Hypothyroidism     L5 compression fracture (HCC) 08/2022    Left ventricular hypertrophy     Legally blind     Liver disease     Low back pain     Macular degeneration     Mitral regurgitation     Osteoarthritis of hip     Osteoporosis     Pancreatitis 01/26/2022    Paroxysmal atrial fibrillation     Peripheral neuropathy     Premature ventricular contractions     Pulmonary hypertension     Renal insufficiency syndrome     Type 2 diabetes mellitus     Uterine cancer      Past Surgical History:   Procedure Laterality Date    AORTIC VALVE REPAIR/REPLACEMENT      APPENDECTOMY      CATARACT EXTRACTION      1970, 1999    CHOLECYSTECTOMY      COLONOSCOPY      ENDOSCOPY  08/15/2014    no gross lesions in stomach/duodenum, erythrematous mucosa in stomach    EPIDURAL BLOCK      HYSTERECTOMY  2007    STERNOTOMY      UPPER GASTROINTESTINAL ENDOSCOPY        General Information       Row Name 08/02/23 1455          Physical Therapy Time and Intention    Document Type evaluation (P)   -SK     Mode of Treatment individual therapy;physical therapy (P)   -SK       Row Name 08/02/23 1455          General Information    Patient Profile Reviewed yes (P)   -SK     Prior Level of Function -- (P)   requires 24 hour care at home; home.  -SK     Existing Precautions/Restrictions no known precautions/restrictions (P)   -SK     Barriers to Rehab visual deficit;impaired sensation (P)   neuropathy; feet   -SK       Row Name 08/02/23 1455          Living Environment    People in Home child(edison), adult;other relative(s) (P)   -SK       Row Name 08/02/23 1455          Home Main Entrance    Number of Stairs, Main Entrance one (P)   -SK       Row Name 08/02/23 1455          Cognition    Orientation Status (Cognition) oriented to;person;place (P)   -SK       Row Name 08/02/23 1455          Safety Issues, Functional Mobility    Impairments Affecting Function (Mobility) balance;endurance/activity tolerance;pain;postural/trunk control;sensation/sensory awareness;strength;visual/perceptual (P)   -SK     Comment, Safety Issues/Impairments (Mobility) nonskid socks and gait belt donned. (P)   -SK               User Key  (r) = Recorded By, (t) = Taken By, (c) = Cosigned By      Initials Name Provider Type    Jocy Olivera, PT Student PT Student                   Mobility       Row Name 08/02/23 1459          Bed Mobility    Bed Mobility sit-supine (P)   -SK     Supine-Sit [place] (Bed Mobility) minimum assist (75% patient effort) (P)   -SK     Sit-Supine [place] (Bed Mobility) minimum assist (75% patient effort) (P)   -SK     Assistive Device (Bed Mobility) bed rails;head of bed elevated (P)   -SK     Comment, (Bed Mobility) min x 1 for all bed mobility noted (P)   -SK       Row Name 08/02/23 1459          Sit-Stand Transfer    Sit-Stand [place] (Transfers) contact guard (P)   -SK     Assistive Device (Sit-Stand Transfers) walker, front-wheeled (P)   -SK     Comment, (Sit-Stand Transfer) forward flexed posture in standing. (P)   -SK       Row Name 08/02/23 1459          Gait/Stairs (Locomotion)    [place] Level (Gait) contact guard (P)   -SK     Assistive Device (Gait) walker, front-wheeled (P)   -SK     Distance in Feet (Gait) 12' (P)   -SK     Deviations/Abnormal Patterns (Gait) gait speed decreased;base of support, narrow;stride length decreased (P)   -SK     Bilateral Gait Deviations forward flexed  posture (P)   -SK     Comment, (Gait/Stairs) Pt CGA while ambulating 12' today with the use of RW . Decreased ambulation speed, stride length, and foward flexed posture. VC/ tactile cues for posture correction. (P)   -SK               User Key  (r) = Recorded By, (t) = Taken By, (c) = Cosigned By      Initials Name Provider Type    Jocy Olivera, PT Student PT Student                   Obj/Interventions       Row Name 08/02/23 1503          Range of Motion Comprehensive    Comment, General Range of Motion BUE and LE WNL (P)   -SK       Row Name 08/02/23 1503          Strength Comprehensive (MMT)    Comment, General Manual Muscle Testing (MMT) Assessment BUE WNL. RLE 4-/5, LLE 4+/5. Pt stated that her R side tends to have more pain compared to her L side. (P)   -SK       Row Name 08/02/23 1503          Balance    Balance Assessment sit to stand dynamic balance (P)   -SK     Static Sitting Balance contact guard;verbal cues (P)   -SK     Sit to Stand Dynamic Balance verbal cues;contact guard (P)   -SK     Static Standing Balance verbal cues;contact guard (P)   -SK     Position/Device Used, Standing Balance supported;walker, front-wheeled (P)   -SK       Row Name 08/02/23 1503          Sensory Assessment (Somatosensory)    Sensory Assessment (Somatosensory) right LE (P)   -SK     Right LE Sensory Assessment impaired (P)   neuropathy  -SK               User Key  (r) = Recorded By, (t) = Taken By, (c) = Cosigned By      Initials Name Provider Type    Jocy Olivera, PT Student PT Student                   Goals/Plan       Row Name 08/02/23 1515          Bed Mobility Goal 1 (PT)    Activity/Assistive Device (Bed Mobility Goal 1, PT) bed mobility activities, all (P)   -SK     [place] Level/Cues Needed (Bed Mobility Goal 1, PT) contact guard required (P)   -SK     Time Frame (Bed Mobility Goal 1, PT) long term goal (LTG);1 week (P)   -SK       Row Name 08/02/23 1515          Transfer Goal 1 (PT)     Activity/Assistive Device (Transfer Goal 1, PT) sit-to-stand/stand-to-sit (P)   -SK     [place] Level/Cues Needed (Transfer Goal 1, PT) standby assist (P)   -SK     Time Frame (Transfer Goal 1, PT) long term goal (LTG);1 week (P)   -SK       Row Name 08/02/23 1519          Gait Training Goal 1 (PT)    Activity/Assistive Device (Gait Training Goal 1, PT) increase endurance/gait distance;walker, rolling (P)   -SK     [place] Level (Gait Training Goal 1, PT) contact guard required (P)   -SK     Distance (Gait Training Goal 1, PT) In 1 week, pt will be able to ambulate 25' in order to demonstrate increased endurance. (P)   -SK     Time Frame (Gait Training Goal 1, PT) long term goal (LTG);1 week (P)   -SK       Row Name 08/02/23 1512          Therapy Assessment/Plan (PT)    Planned Therapy Interventions (PT) bed mobility training;gait training;balance training;patient/family education;postural re-education;strengthening;transfer training (P)   -SK               User Key  (r) = Recorded By, (t) = Taken By, (c) = Cosigned By      Initials Name Provider Type    SK Jocy Modi, PT Student PT Student                   Clinical Impression       Row Name 08/02/23 8260          Pain    Pretreatment Pain Rating 6/10 (P)   -SK     Posttreatment Pain Rating 6/10 (P)   -SK     Pain Location - back;foot (P)   -SK     Pre/Posttreatment Pain Comment R foot (P)   -SK     Pain Intervention(s) Repositioned;Ambulation/increased activity (P)   -SK       Row Name 08/02/23 1847          Plan of Care Review    Plan of Care Reviewed With patient;daughter (P)   -SK     Outcome Evaluation Pt seen for PT today after being admitted for further workup for abnormal labs and increased back pain. Pt minAx1 for all bed mobility and ambulation. CGA for STS transfers. Pt was able to tolerate ambuating 12' with the use of RW. Pt demonstrates a significant forward flexed posture, decreased gait speed, narrow base of support, and decreased  stride length. Pt fatigues easily due to decrease in overall endurance and strength. Pt will continue to benefit from skilled PT interventions to address functional mobility. Rec home with 24 hour care and HH at d/c. (P)   -SK       Row Name 08/02/23 1507          Therapy Assessment/Plan (PT)    Rehab Potential (PT) good, to achieve stated therapy goals (P)   -SK     Criteria for Skilled Interventions Met (PT) yes (P)   -SK     Therapy Frequency (PT) 5 times/wk (P)   -SK     Predicted Duration of Therapy Intervention (PT) 1 week (P)   -SK       Row Name 08/02/23 1507          Vital Signs    Pre Patient Position -- (P)   -SK     Post Patient Position -- (P)   -SK       Row Name 08/02/23 1507          Positioning and Restraints    Pre-Treatment Position in bed (P)   -SK     Post Treatment Position bed (P)   -SK     In Bed notified nsg;supine;call light within reach;encouraged to call for assist;exit alarm on (P)   -SK               User Key  (r) = Recorded By, (t) = Taken By, (c) = Cosigned By      Initials Name Provider Type    Jocy Olivera, PT Student PT Student                   Outcome Measures       Row Name 08/02/23 1519 08/02/23 0810       How much help from another person do you currently need...    Turning from your back to your side while in flat bed without using bedrails? 3 (P)   -SK 3  -TS    Moving from lying on back to sitting on the side of a flat bed without bedrails? 3 (P)   -SK 3  -TS    Moving to and from a bed to a chair (including a wheelchair)? 3 (P)   -SK 3  -TS    Standing up from a chair using your arms (e.g., wheelchair, bedside chair)? 3 (P)   -SK 3  -TS    Climbing 3-5 steps with a railing? 2 (P)   -SK 2  -TS    To walk in hospital room? 3 (P)   -SK 2  -TS    AM-PAC 6 Clicks Score (PT) 17 (P)   -SK 16  -TS    Highest level of mobility 5 --> Static standing (P)   -SK 5 --> Static standing  -TS      Row Name 08/02/23 1519 08/02/23 1248       Functional Assessment    Outcome Measure  Options AM-PAC 6 Clicks Basic Mobility (PT) (P)   -SK AM-PAC 6 Clicks Daily Activity (OT)  -AHMET              User Key  (r) = Recorded By, (t) = Taken By, (c) = Cosigned By      Initials Name Provider Type    TS Herbie Dickens, RN Registered Nurse    Yris Echavarria, CHERRY Occupational Therapist    Jocy Olivera, PT Student PT Student                                 Physical Therapy Education       Title: PT OT SLP Therapies (In Progress)       Topic: Physical Therapy (Done)       Point: Mobility training (Done)       Learning Progress Summary             Patient Acceptance, E,TB, VU,NR by SK at 8/2/2023 1534                         Point: Body mechanics (Done)       Learning Progress Summary             Patient Acceptance, E,TB, VU,NR by SK at 8/2/2023 1534                         Point: Precautions (Done)       Learning Progress Summary             Patient Acceptance, E,TB, VU,NR by SK at 8/2/2023 1534                                         User Key       Initials Effective Dates Name Provider Type Discipline    SK 07/28/23 -  Jocy Modi, PT Student PT Student PT                  PT Recommendation and Plan  Planned Therapy Interventions (PT): (P) bed mobility training, gait training, balance training, patient/family education, postural re-education, strengthening, transfer training  Plan of Care Reviewed With: (P) patient, daughter  Outcome Evaluation: (P) Pt seen for PT today after being admitted for further workup for abnormal labs and increased back pain. Pt minAx1 for all bed mobility and ambulation. CGA for STS transfers. Pt was able to tolerate ambuating 12' with the use of RW. Pt demonstrates a significant forward flexed posture, decreased gait speed, narrow base of support, and decreased stride length. Pt fatigues easily due to decrease in overall endurance and strength. Pt will continue to benefit from skilled PT interventions to address functional mobility. Rec home with 24 hour care and HH at  d/c.     Time Calculation:         PT Charges       Row Name 08/02/23 1534             Time Calculation    Start Time 1403 (P)   -SK      Stop Time 1422 (P)   -SK      Time Calculation (min) 19 min (P)   -SK      PT Received On 08/02/23 (P)   -SK      PT - Next Appointment 08/03/23 (P)   -SK      PT Goal Re-Cert Due Date 08/09/23 (P)   -SK         Time Calculation- PT    Total Timed Code Minutes- PT 19 minute(s) (P)   -SK                User Key  (r) = Recorded By, (t) = Taken By, (c) = Cosigned By      Initials Name Provider Type    SK Jocy Modi, PT Student PT Student                  Therapy Charges for Today       Code Description Service Date Service Provider Modifiers Qty    41819097749 HC PT EVAL MOD COMPLEXITY 1 8/2/2023 Jocy Modi, PT Student GP 1    08366070538 HC PT THERAPEUTIC ACT EA 15 MIN 8/2/2023 Jocy Modi, PT Student GP 1            PT G-Codes  Outcome Measure Options: (P) AM-PAC 6 Clicks Basic Mobility (PT)  AM-PAC 6 Clicks Score (PT): (P) 17  AM-PAC 6 Clicks Score (OT): 15  PT Discharge Summary  Anticipated Discharge Disposition (PT): (P) home with 24/7 care, home with home health    Jocy Modi PT Student  8/2/2023

## 2023-08-02 NOTE — THERAPY EVALUATION
Number Of Freeze-Thaw Cycles: 2 freeze-thaw cycles Patient Name: Helena Carmen  : 1931    MRN: [id number]                              Today's Date: 2023       Admit Date: 2023    Visit Dx:     ICD-10-CM ICD-9-CM   1. Uncontrolled hypertension  I10 401.9   2. Thrombocytopenia  D69.6 287.5   3. Hyponatremia  E87.1 276.1   4. History of ITP  Z86.2 V12.3   5. Rash (left flank)  R21 782.1     Patient Active Problem List   Diagnosis    Aortic valve stenosis    Fatigue    MI (mitral incompetence)    PVC (premature ventricular contraction)    Legally blind    Essential hypertension    Hypertriglyceridemia    Pulmonary hypertension    Paroxysmal atrial fibrillation    Anxiety    Stage 4 chronic kidney disease    Chronic pain disorder    Degeneration of lumbar intervertebral disc    Type 2 diabetes mellitus with hyperglycemia    Esophageal reflux    Gastroparesis    Hiatal hernia    Iatrogenic hypothyroidism    Macular degeneration    Malignant neoplasm of uterus    Osteoarthritis of knee    Peripheral nerve disease    Secondary hyperparathyroidism    Thrombocytopenia    Chronic heart failure with preserved ejection fraction    Other cirrhosis of liver    Hypothyroidism    Nonrheumatic tricuspid valve regurgitation    Anemia, chronic disease    Hyponatremia    Closed fracture of fifth lumbar vertebra    Closed fracture of fifth lumbar vertebra, unspecified fracture morphology, initial encounter    Diabetic polyneuropathy associated with type 2 diabetes mellitus    Chronic ITP (idiopathic thrombocytopenia)    Chronic midline low back pain with bilateral sciatica    Acute deep vein thrombosis of calf, bilateral    Compression fracture of L5 vertebra with routine healing    Acute left-sided low back pain with left-sided sciatica    Urine retention    Rib fracture    Acute on chronic combined systolic and diastolic congestive heart failure    Acute UTI    Blindness right eye category 3, blindness left eye category 3    Cognitive communication deficit     Detail Level: Simple Pseudomonas (aeruginosa) (mallei) (pseudomallei) as the cause of diseases classified elsewhere    Multifocal pneumonia     Past Medical History:   Diagnosis Date    Aortic valve stenosis     s/p tissue AVR    Back pain     CKD (chronic kidney disease)     Colitis due to Clostridioides difficile 01/26/2022    Diastolic dysfunction     Grade 2 per echocardiogram 2013    Diverticulosis     Exertional shortness of breath     Heart disease     Hiatal hernia     Hyperlipidemia     Hypertension     Hyperthyroidism     Hypertriglyceridemia 05/31/2018    Hypothyroidism     L5 compression fracture (HCC) 08/2022    Left ventricular hypertrophy     Legally blind     Liver disease     Low back pain     Macular degeneration     Mitral regurgitation     Osteoarthritis of hip     Osteoporosis     Pancreatitis 01/26/2022    Paroxysmal atrial fibrillation     Peripheral neuropathy     Premature ventricular contractions     Pulmonary hypertension     Renal insufficiency syndrome     Type 2 diabetes mellitus     Uterine cancer      Past Surgical History:   Procedure Laterality Date    AORTIC VALVE REPAIR/REPLACEMENT      APPENDECTOMY      CATARACT EXTRACTION      1970, 1999    CHOLECYSTECTOMY      COLONOSCOPY      ENDOSCOPY  08/15/2014    no gross lesions in stomach/duodenum, erythrematous mucosa in stomach    EPIDURAL BLOCK      HYSTERECTOMY  2007    STERNOTOMY      UPPER GASTROINTESTINAL ENDOSCOPY        General Information       Row Name 08/02/23 1234          OT Time and Intention    Document Type evaluation  -JW     Mode of Treatment occupational therapy  -JW       Row Name 08/02/23 1234          General Information    Patient Profile Reviewed yes  -JW     Prior Level of Function mod assist:;ADL's;all household mobility  assist for ADLs, RW for short distances. Has 24/7 care/assist  -JW     Existing Precautions/Restrictions fall  -JW     Barriers to Rehab visual deficit;previous functional deficit   legally blind  -AHMET Valerio  Consent: The patient's consent was obtained including but not limited to risks of crusting, scabbing, blistering, scarring, darker or lighter pigmentary change, recurrence, incomplete removal and infection. Name 08/02/23 1234          Living Environment    People in Home child(edison), adult;grandchild(edison)  -       Row Name 08/02/23 1234          Cognition    Orientation Status (Cognition) oriented x 3  -       Row Name 08/02/23 1234          Safety Issues, Functional Mobility    Impairments Affecting Function (Mobility) balance;endurance/activity tolerance;strength;visual/perceptual  -               User Key  (r) = Recorded By, (t) = Taken By, (c) = Cosigned By      Initials Name Provider Type     Yris Cho OT Occupational Therapist                     Mobility/ADL's       Row Name 08/02/23 1236          Bed Mobility    Bed Mobility supine-sit  -     Supine-Sit [place] (Bed Mobility) standby assist;verbal cues  -     Assistive Device (Bed Mobility) bed rails;head of bed elevated  -     Comment, (Bed Mobility) VCs d/t visual deficit  -       Row Name 08/02/23 1236          Transfers    Transfers sit-stand transfer  -       Row Name 08/02/23 [facility]          Sit-Stand Transfer    Sit-Stand [place] (Transfers) contact guard  -     Assistive Device (Sit-Stand Transfers) walker, front-wheeled  -       Row Name 08/02/23 1236          Functional Mobility    Functional Mobility- Ind. Level contact guard assist;minimum assist (75% patient effort)  -     Functional Mobility- Device walker, front-wheeled  -     Functional Mobility- Comment fxl ambulation around the bed to chair with CGA-Deandre using RW. Kyphotic posture in standing, reports this is baseline. Fatigues quickly with OOB ax  -       Row Name 08/02/23 [facility]          Activities of Daily Living    BADL Assessment/Intervention lower body dressing  -       Row Name 08/02/23 [facility]          Lower Body Dressing Assessment/Training    [place] Level (Lower Body Dressing) don;socks;doff;maximum assist (25% patient effort)  -     Position (Lower Body Dressing) supported sitting  -               User Key  (r) = Recorded By, (t) = Taken  By, (c) = Cosigned By      Initials Name Provider Type     Yris Cho OT Occupational Therapist                   Obj/Interventions       [facility] Name 08/02/23 1239          Sensory Assessment (Somatosensory)    Sensory Assessment (Somatosensory) sensation intact  -[facility] Name 08/02/23 1239          Vision Assessment/Intervention    Visual Impairment/Limitations legally blind  -     Vision Assessment Comment macular degeneration. Able to see some light and shapes up close. Requires verbal and tactile cues for guiding around room  -[facility] Name 08/02/23 1239          Range of Motion Comprehensive    Comment, General Range of Motion limited dillon shoulder flexion  -[facility] Name 08/02/23 1239          Strength Comprehensive (MMT)    Comment, General Manual Muscle Testing (MMT) Assessment generalized weakness  -[facility] Name 08/02/23 1239          Motor Skills    Motor Skills functional endurance  -     Functional Endurance fatigues quickly with OOB ax. Amb into bathroom deferred d/t dec activity tolerance  -[facility] Name 08/02/23 1239          Balance    Balance Assessment sitting static balance;sitting dynamic balance;standing static balance;standing dynamic balance  -     Static Sitting Balance supervision  -     Dynamic Sitting Balance standby assist  -     Position, Sitting Balance sitting edge of bed  -     Static Standing Balance contact guard  -     Dynamic Standing Balance contact guard;minimal assist  -     Position/Device Used, Standing Balance supported  -     Balance Interventions sitting;standing;occupation based/functional task  -               User Key  (r) = Recorded By, (t) = Taken By, (c) = Cosigned By      Initials Name Provider Type     Yris Cho OT Occupational Therapist                   Goals/Plan       [facility] Name 08/02/23 1247          Transfer Goal 1 (OT)    Activity/Assistive Device (Transfer Goal 1, OT) transfers, all  -     [place]  Duration Of Freeze Thaw-Cycle (Seconds): 10 Level/Cues Needed (Transfer Goal 1, OT) contact guard required;standby assist  -     Time Frame (Transfer Goal 1, OT) short term goal (STG);2 weeks  -     Progress/Outcome (Transfer Goal 1, OT) goal ongoing  -       Row Name 08/02/23 1247          Dressing Goal 1 (OT)    Activity/Device (Dressing Goal 1, OT) upper body dressing  -     [place]/Cues Needed (Dressing Goal 1, OT) set-up required  -     Time Frame (Dressing Goal 1, OT) short term goal (STG);2 weeks  -     Progress/Outcome (Dressing Goal 1, OT) goal ongoing  -       Row Name 08/02/23 1247          Toileting Goal 1 (OT)    Activity/Device (Toileting Goal 1, OT) toileting skills, all  -     [place] Level/Cues Needed (Toileting Goal 1, OT) contact guard required;standby assist  -     Time Frame (Toileting Goal 1, OT) short term goal (STG);2 weeks  -     Progress/Outcome (Toileting Goal 1, OT) goal ongoing  -       Row Name 08/02/23 1247          Grooming Goal 1 (OT)    Activity/Device (Grooming Goal 1, OT) grooming skills, all  -JW     [place] (Grooming Goal 1, OT) set-up required;supervision required  -     Time Frame (Grooming Goal 1, OT) short term goal (STG);2 weeks  -     Progress/Outcome (Grooming Goal 1, OT) goal ongoing  -       Row Name 08/02/23 1245          Problem Specific Goal 1 (OT)    Problem Specific Goal 1 (OT) Pt will participate in ~20 mins fxl activity with <2 rest breaks to promote endurance/activity tolerance for ADLs and fxl mobility  -     Time Frame (Problem Specific Goal 1, OT) short term goal (STG);2 weeks  -     Progress/Outcome (Problem Specific Goal 1, OT) goal ongoing  -       Row Name 08/02/23 1247          Therapy Assessment/Plan (OT)    Planned Therapy Interventions (OT) activity tolerance training;BADL retraining;functional balance retraining;occupation/activity based interventions;patient/caregiver education/training;transfer/mobility retraining;strengthening exercise  -   Post-Care Instructions: I reviewed with the patient in detail post-care instructions. Patient is to wear sunprotection, and avoid picking at any of the treated lesions. Pt may apply Vaseline to crusted or scabbing areas.              User Key  (r) = Recorded By, (t) = Taken By, (c) = Cosigned By      Initials Name Provider Type    Yris Echavarria, CHERRY Occupational Therapist                   Clinical Impression       Row Name 08/02/23 1241          Pain Assessment    Pretreatment Pain Rating 0/10 - no pain  -     Posttreatment Pain Rating 0/10 - no pain  -       Row Name 08/02/23 1241          Plan of Care Review    Plan of Care Reviewed With patient;daughter  -     Outcome Evaluation Pt is a 89 y/o F admitted with thrombocytopenia. Hx macular degeneration- legally blind. She lives with family and has 24/7 assist for ADLs and fxl mobility. Use of RW for short distances and reports she walks ~20 ft at baseline. Today she sits EOB with SBA, dec fxl reach for LB dressing task, MaxA to don socks prior to mobility, SBA for UB dressing. STS with CGA and use of RW for short ambulation in the room to simulate household mobilty. Verbal and tactile cues for guidance around room. Demo's kyphotic posture in standing and reports this is baseline. She fatigues quickly, further mobility into bathroom deferred d/t dec endurance. She is assisted to chair, left UIC with family present, all needs w/in reach.  -       Row Name 08/02/23 1241          Therapy Assessment/Plan (OT)    Rehab Potential (OT) good, to achieve stated therapy goals  -     Criteria for Skilled Therapeutic Interventions Met (OT) yes;skilled treatment is necessary  -     Therapy Frequency (OT) 3 times/wk  -       Row Name 08/02/23 1241          Therapy Plan Review/Discharge Plan (OT)    Anticipated Discharge Disposition (OT) home with 24/7 care  -       Row Name 08/02/23 1241          Positioning and Restraints    Pre-Treatment Position in bed  -     Post Treatment Position chair  -JW     In Chair notified nsg;sitting;call light within reach;encouraged to call for assist;exit alarm on;with family/caregiver;legs elevated  -               User Key  (r) = Recorded  Render Post-Care Instructions In Note?: no By, (t) = Taken By, (c) = Cosigned By      Initials Name Provider Type    Yris Echavarria OT Occupational Therapist                   Outcome Measures       Row Name 08/02/23 1248          How much help from another is currently needed...    Putting on and taking off regular lower body clothing? 2  -JW     Bathing (including washing, rinsing, and drying) 2  -JW     Toileting (which includes using toilet bed pan or urinal) 2  -JW     Putting on and taking off regular upper body clothing 3  -JW     Taking care of personal grooming (such as brushing teeth) 3  -JW     Eating meals 3  -JW     AM-PAC 6 Clicks Score (OT) 15  -JW       Row Name 08/02/23 0810          How much help from another person do you currently need...    Turning from your back to your side while in flat bed without using bedrails? 3  -TS     Moving from lying on back to sitting on the side of a flat bed without bedrails? 3  -TS     Moving to and from a bed to a chair (including a wheelchair)? 3  -TS     Standing up from a chair using your arms (e.g., wheelchair, bedside chair)? 3  -TS     Climbing 3-5 steps with a railing? 2  -TS     To walk in hospital room? 2  -TS     AM-PAC 6 Clicks Score (PT) 16  -TS     Highest level of mobility 5 --> Static standing  -TS       Row Name 08/02/23 1248          Functional Assessment    Outcome Measure Options AM-PAC 6 Clicks Daily Activity (OT)  -               User Key  (r) = Recorded By, (t) = Taken By, (c) = Cosigned By      Initials Name Provider Type    Herbie Olivera RN Registered Nurse    Yris Echavarria OT Occupational Therapist                    Occupational Therapy Education       Title: PT OT SLP Therapies (In Progress)       Topic: Occupational Therapy (In Progress)       Point: ADL training (Done)       Description:   Instruct learner(s) on proper safety adaptation and remediation techniques during self care or transfers.   Instruct in proper use of assistive devices.                   Learning Progress Summary             Patient Acceptance, E, VU by AHMET at 8/2/2023 1249    Comment: role of OT, plan of care, safety                         Point: Home exercise program (Not Started)       Description:   Instruct learner(s) on appropriate technique for monitoring, assisting and/or progressing therapeutic exercises/activities.                  Learner Progress:  Not documented in this visit.              Point: Precautions (Done)       Description:   Instruct learner(s) on prescribed precautions during self-care and functional transfers.                  Learning Progress Summary             Patient Acceptance, E, VU by AHMET at 8/2/2023 1249    Comment: role of OT, plan of care, safety                         Point: Body mechanics (Done)       Description:   Instruct learner(s) on proper positioning and spine alignment during self-care, functional mobility activities and/or exercises.                  Learning Progress Summary             Patient Acceptance, E, VU by AHMET at 8/2/2023 1249    Comment: role of OT, plan of care, safety                                         User Key       Initials Effective Dates Name Provider Type Discipline     06/10/21 -  Yris Cho OT Occupational Therapist OT                  OT Recommendation and Plan  Planned Therapy Interventions (OT): activity tolerance training, BADL retraining, functional balance retraining, occupation/activity based interventions, patient/caregiver education/training, transfer/mobility retraining, strengthening exercise  Therapy Frequency (OT): 3 times/wk  Plan of Care Review  Plan of Care Reviewed With: patient, daughter  Outcome Evaluation: Pt is a 89 y/o F admitted with thrombocytopenia. Hx macular degeneration- legally blind. She lives with family and has 24/7 assist for ADLs and fxl mobility. Use of RW for short distances and reports she walks ~20 ft at baseline. Today she sits EOB with SBA, dec fxl reach for LB dressing task, MaxA to  don socks prior to mobility, SBA for UB dressing. STS with CGA and use of RW for short ambulation in the room to simulate household mobilty. Verbal and tactile cues for guidance around room. Jeffrey's kyphotic posture in standing and reports this is baseline. She fatigues quickly, further mobility into bathroom deferred d/t dec endurance. She is assisted to chair, left UIC with family present, all needs w/in reach.     Time Calculation:   Evaluation Complexity (OT)  Review Occupational Profile/Medical/Therapy History Complexity: brief/low complexity  Assessment, Occupational Performance/Identification of Deficit Complexity: 1-3 performance deficits  Clinical Decision Making Complexity (OT): problem focused assessment/low complexity  Overall Complexity of Evaluation (OT): low complexity     Time Calculation- OT       Row Name 08/02/23 1249             Time Calculation- OT    OT Start Time 0958  -      OT Stop Time 1024  -      OT Time Calculation (min) 26 min  -      Total Timed Code Minutes- OT 16 minute(s)  -      OT Received On 08/02/23  -      OT - Next Appointment 08/04/23  -      OT Goal Re-Cert Due Date 08/16/23  -         Timed Charges    21565 - OT Therapeutic Activity Minutes 16  -         Untimed Charges    OT Eval/Re-eval Minutes 10  -         Total Minutes    Timed Charges Total Minutes 16  -      Untimed Charges Total Minutes 10  -       Total Minutes 26  -                User Key  (r) = Recorded By, (t) = Taken By, (c) = Cosigned By      Initials Name Provider Type    Yris Echavarria OT Occupational Therapist                  Therapy Charges for Today       Code Description Service Date Service Provider Modifiers Qty    99382938631  OT THERAPEUTIC ACT EA 15 MIN 8/2/2023 Yris Cho OT GO 1    [id number]  OT EVAL LOW COMPLEXITY 3 8/2/2023 Yris Cho OT GO 1                 Yris Cho OT  8/2/2023

## 2023-08-02 NOTE — PROGRESS NOTES
"Nutrition Services    Patient Name:  Helena Carmen  YOB: 1931  MRN: [id number]  Admit Date:  7/31/2023    Assessment Date:  08/02/23    NUTRITION SCREENING      Reason for Encounter Skin- no PI's this admission    Diagnosis/Problem Visited patient at bedside who reports pretty good appetite. Her dgtr was at bedside. Patient was able to give all nutrition hx. She has a hx of CKD4, uterine cancer, liver disease, thrombocytopenia and ITP. She reports she likes the food and has not lost any weight. Her dgtr reports that she does not tolerate any beef besides ground beef and hamburger. Plans for patient to have infusion tomorrow. She is accepting to a vanilla boost glucose control q daily, RD ordered.         PO Diet Diet: Cardiac Diets, Diabetic Diets; Healthy Heart (2-3 Na+); Consistent Carbohydrate; Texture: Regular Texture (IDDSI 7); Fluid Consistency: Thin (IDDSI 0)   PO Supplements/Snacks    PO Intake % Insufficient data        Labs  Listed below, reviewed   Physical Findings Alert, oriented, obese    GI Function Normoactive    Skin Status Skin intact        Height:  Weight:  BMI:   Category  Weight Trend Height: 144.8 cm (57.01\")  Weight: 77.9 kg (171 lb 11.8 oz) (08/01/23 0113)  Body mass index is 37.15 kg/mý.  Obese, Class II (35 - 39.9)  Stable       Intervention/Plan Boost GC q daily  Follow PRN          Results from last 7 days   Lab Units 08/02/23  0544 07/31/23  1920   SODIUM mmol/L 125* 128*   POTASSIUM mmol/L 4.1 4.0   CHLORIDE mmol/L 89* 91*   CO2 mmol/L 23.3 25.1   BUN mg/dL 50* 38*   CREATININE mg/dL 2.02* 1.85*   CALCIUM mg/dL 8.7 9.0   BILIRUBIN mg/dL 0.4 0.3   ALK PHOS U/L 29* 33*   ALT (SGPT) U/L 11 14   AST (SGOT) U/L 15 17   GLUCOSE mg/dL 228* 98     Results from last 7 days   Lab Units 08/02/23  0544   HEMOGLOBIN g/dL 10.9*   HEMATOCRIT % 33.1*   TRIGLYCERIDES mg/dL 171*     COVID19   Date Value Ref Range Status   06/15/2023 Not Detected Not Detected - Ref. Range Final     Lab " Results   Component Value Date    HGBA1C 6.60 (H) 08/02/2023       RD to follow up per protocol.    Electronically signed by:  Lucina Beltran RD  08/02/23 16:45 EDT

## 2023-08-02 NOTE — PROGRESS NOTES
"Daily progress note    Primary care physician      Chief complaint  Patient has rough night but feeling okay this morning and remained awake and alert.  Patient eating fine and had BM.  Patient has no new complaints.    History of present illness  89-year-old white female with very complex past medical history and well-known to our service including ITP chronic low back pain with L5 vertebral body fracture degenerative disease paroxysmal atrial fibrillation DVT diabetes hypertension hyperlipidemia hypothyroidism congestive heart failure and chronic kidney disease stage III who lives at home with her daughter brought to the emergency room with generalized weakness and bruises all over.  Patient work-up in ER revealed worsening thrombocytopenia admitted for management.  Patient denies any fever chills chest pain shortness of breath abdominal pain nausea vomiting diarrhea.  Patient is DNR per her wishes.     REVIEW OF SYSTEMS  Constitutional:  Negative for chills and fever.   HENT:  Negative for ear pain and sore throat.    Respiratory:  Negative for cough and shortness of breath.    Cardiovascular:  Negative for chest pain and palpitations.   Gastrointestinal:  Negative for abdominal pain and vomiting.   Genitourinary:  Negative for dysuria and hematuria.   Musculoskeletal:  Negative for arthralgias and joint swelling.   Skin:  Positive for rash. Negative for pallor.   Neurological:  Negative for numbness and headaches.   Hematological:  Bruises/bleeds easily.   Psychiatric/Behavioral:  Negative for confusion and hallucinations.        PHYSICAL EXAM  Blood pressure 141/64, pulse 62, temperature 97.5 øF (36.4 øC), temperature source Oral, resp. rate 16, height 144.8 cm (57.01\"), weight 77.9 kg (171 lb 11.8 oz), SpO2 95 %.    Constitutional:       General: She is not in acute distress.     Appearance: She is well-developed.   HENT:      Head: Normocephalic and atraumatic.   Eyes:      Extraocular Movements: " Extraocular movements intact.   Cardiovascular:      Rate and Rhythm: Normal rate and regular rhythm.      Heart sounds: Normal heart sounds.   Pulmonary:      Effort: Pulmonary effort is normal.      Breath sounds: Normal breath sounds.   Abdominal:      General: There is no distension.      Comments: Erythematous macular clusters noted along the left flank   Genitourinary:     Comments: Indwelling Hoffman catheter  Skin:     General: Skin is warm.   Neurological:      General: No focal deficit present.      Mental Status: She is alert and oriented to person, place, and time.   Psychiatric:         Mood and Affect: Mood normal.      LAB RESULTS  Lab Results (last 24 hours)       Procedure Component Value Units Date/Time    POC Glucose Once [[id number]]  (Abnormal) Collected: 08/02/23 0800    Specimen: Blood Updated: 08/02/23 0801     Glucose 269 mg/dL      Comment: Meter: [id number] : [id number] Lopezlivia Benzsherwingriffin ELIZABETH       TSH [[id number]]  (Normal) Collected: 08/02/23 0544    Specimen: Blood Updated: 08/02/23 0704     TSH 0.499 uIU/mL     Lipid Panel [[id number]]  (Abnormal) Collected: 08/02/23 0544    Specimen: Blood Updated: 08/02/23 0700     Total Cholesterol 205 mg/dL      Triglycerides 171 mg/dL      HDL Cholesterol 50 mg/dL      LDL Cholesterol  125 mg/dL      VLDL Cholesterol 30 mg/dL      LDL/HDL Ratio 2.42    Narrative:      Cholesterol Reference Ranges  (U.S. Department of Health and Human Services ATP III Classifications)    Desirable          <200 mg/dL  Borderline High    200-239 mg/dL  High Risk          >240 mg/dL      Triglyceride Reference Ranges  (U.S. Department of Health and Human Services ATP III Classifications)    Normal           <150 mg/dL  Borderline High  150-199 mg/dL  High             200-499 mg/dL  Very High        >500 mg/dL    HDL Reference Ranges  (U.S. Department of Health and Human Services ATP III Classifications)    Low     <40 mg/dl (major risk factor for CHD)  High    >60  mg/dl ('negative' risk factor for CHD)        LDL Reference Ranges  (U.S. Department of Health and Human Services ATP III Classifications)    Optimal          <100 mg/dL  Near Optimal     100-129 mg/dL  Borderline High  130-159 mg/dL  High             160-189 mg/dL  Very High        >189 mg/dL    Comprehensive Metabolic Panel [[id number]]  (Abnormal) Collected: 08/02/23 0544    Specimen: Blood Updated: 08/02/23 0700     Glucose 228 mg/dL      BUN 50 mg/dL      Creatinine 2.02 mg/dL      Sodium 125 mmol/L      Potassium 4.1 mmol/L      Chloride 89 mmol/L      CO2 23.3 mmol/L      Calcium 8.7 mg/dL      Total Protein 5.5 g/dL      Albumin 3.3 g/dL      ALT (SGPT) 11 U/L      AST (SGOT) 15 U/L      Alkaline Phosphatase 29 U/L      Total Bilirubin 0.4 mg/dL      Globulin 2.2 gm/dL      A/G Ratio 1.5 g/dL      BUN/Creatinine Ratio 24.8     Anion Gap 12.7 mmol/L      eGFR 22.8 mL/min/1.73     Narrative:      GFR Normal >60  Chronic Kidney Disease <60  Kidney Failure <15    The GFR formula is only valid for adults with stable renal function between ages 18 and 70.    Hemoglobin A1c [[id number]]  (Abnormal) Collected: 08/02/23 0544    Specimen: Blood Updated: 08/02/23 0656     Hemoglobin A1C 6.60 %     Narrative:      Hemoglobin A1C Ranges:    Increased Risk for Diabetes  5.7% to 6.4%  Diabetes                     >= 6.5%  Diabetic Goal                < 7.0%    CBC & Differential [[id number]]  (Abnormal) Collected: 08/02/23 0544    Specimen: Blood Updated: 08/02/23 0646    Narrative:      The following orders were created for panel order CBC & Differential.  Procedure                               Abnormality         Status                     ---------                               -----------         ------                     CBC Auto Differential[[id number]]        Abnormal            Final result                 Please view results for these tests on the individual orders.    CBC Auto Differential [[id number]]  (Abnormal)  Collected: 08/02/23 0544    Specimen: Blood Updated: 08/02/23 0646     WBC 8.74 10*3/mm3      RBC 3.98 10*6/mm3      Hemoglobin 10.9 g/dL      Hematocrit 33.1 %      MCV 83.2 fL      MCH 27.4 pg      MCHC 32.9 g/dL      RDW 16.5 %      RDW-SD 49.8 fl      Platelets 28 10*3/mm3      Neutrophil % 82.8 %      Lymphocyte % 10.8 %      Monocyte % 5.6 %      Eosinophil % 0.0 %      Basophil % 0.1 %      Neutrophils, Absolute 7.24 10*3/mm3      Lymphocytes, Absolute 0.94 10*3/mm3      Monocytes, Absolute 0.49 10*3/mm3      Eosinophils, Absolute 0.00 10*3/mm3      Basophils, Absolute 0.01 10*3/mm3     POC Glucose Once [[id number]]  (Abnormal) Collected: 08/02/23 0548    Specimen: Blood Updated: 08/02/23 0549     Glucose 247 mg/dL      Comment: Meter: [id number] : [id number] Kalin JOHNSON       POC Glucose Once [[id number]]  (Abnormal) Collected: 08/01/23 2140    Specimen: Blood Updated: 08/01/23 2141     Glucose 300 mg/dL      Comment: Meter: [id number] : [id number] Kalin JOHNSON       POC Glucose Once [[id number]]  (Abnormal) Collected: 08/01/23 1718    Specimen: Blood Updated: 08/01/23 1726     Glucose 333 mg/dL      Comment: Meter: [id number] : [id number] [facility] RADHAMES             Imaging Results (Last 24 Hours)       ** No results found for the last 24 hours. **            Current Facility-Administered Medications:     budesonide-formoterol (SYMBICORT) 160-4.5 MCG/ACT inhaler 2 puff, 2 puff, Inhalation, BID, Fred Jeffrey MD, 2 puff at 08/02/23 0731    carvedilol (COREG) tablet 12.5 mg, 12.5 mg, Oral, BID With Meals, Fred Jeffrey MD, 12.5 mg at 08/02/23 0924    cholecalciferol (VITAMIN D3) tablet 1,000 Units, 1,000 Units, Oral, Daily, Fred Jeffrey MD, 1,000 Units at 08/02/23 0924    cyanocobalamin injection 1,000 mcg, 1,000 mcg, Intramuscular, Daily, Tomer Jeong MD, 1,000 mcg at 08/02/23 0924    dextrose (D50W) (25 g/50 mL) IV injection 25 g, 25 g, Intravenous, Q15 Min PRN, Clayton,  MD Mango    dextrose (GLUTOSE) oral gel 15 g, 15 g, Oral, Q15 Min PRN, Mango Arnold MD    gabapentin (NEURONTIN) capsule 600 mg, 600 mg, Oral, Q12H, Fred Jeffrey MD, 600 mg at 08/02/23 0924    glucagon (GLUCAGEN) injection 1 mg, 1 mg, Intramuscular, Q15 Min PRN, Mango Arnold MD    guaiFENesin (MUCINEX) 12 hr tablet 600 mg, 600 mg, Oral, Q12H, Mango Arnold MD, 600 mg at 08/02/23 0925    hydrALAZINE (APRESOLINE) tablet 50 mg, 50 mg, Oral, TID, Fred Jeffrey MD, 50 mg at 08/02/23 0925    HYDROcodone-acetaminophen (NORCO) 7.5-325 MG per tablet 1 tablet, 1 tablet, Oral, Q4H PRN, Mango Arnold MD, 1 tablet at 08/01/23 2048    insulin glargine (LANTUS, SEMGLEE) injection 15 Units, 15 Units, Subcutaneous, Nightly, Mango Arnold MD, 15 Units at 08/01/23 2047    insulin lispro (HUMALOG/ADMELOG) injection 2-7 Units, 2-7 Units, Subcutaneous, 4x Daily AC & at Bedtime, Mango Arnold MD, 3 Units at 08/02/23 0925    insulin lispro (HUMALOG/ADMELOG) injection 5 Units, 5 Units, Subcutaneous, TID With Meals, Mango Arnold MD, 5 Units at 08/02/23 1340    levothyroxine (SYNTHROID, LEVOTHROID) tablet 25 mcg, 25 mcg, Oral, QAM, Fred Jeffrey MD, 25 mcg at 08/02/23 0600    LORazepam (ATIVAN) tablet 1 mg, 1 mg, Oral, Q PM, Fred Jeffrey MD, 1 mg at 08/01/23 1803    methylPREDNISolone sodium succinate (SOLU-Medrol) injection 80 mg, 80 mg, Intravenous, Daily, Tomer Jeong MD, 80 mg at 08/02/23 0925    montelukast (SINGULAIR) tablet 10 mg, 10 mg, Oral, Nightly, Fred Jeffrey MD, 10 mg at 08/01/23 2047    ondansetron (ZOFRAN) injection 4 mg, 4 mg, Intravenous, Q6H PRN, Fred Jeffrey MD, 4 mg at 08/01/23 2044    pantoprazole (PROTONIX) EC tablet 40 mg, 40 mg, Oral, Q AM, Fred Jeffrey MD, 40 mg at 08/02/23 0544    sennosides-docusate (PERICOLACE) 8.6-50 MG per tablet 2 tablet, 2 tablet, Oral, BID, Mango Arnold MD, 2 tablet at 08/02/23 1100    tamsulosin (FLOMAX) 24 hr capsule 0.4 mg, 0.4 mg, Oral, Daily,  Fred Jeffrey MD, 0.4 mg at 08/02/23 0925    valACYclovir (VALTREX) tablet 1,000 mg, 1,000 mg, Oral, Q24H, Tomer Jeong MD     ASSESSMENT  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  Chronic anemia  Congestive heart failure  Diabetes mellitus  Hypertension  Hypothyroidism  Paroxysmal atrial fibrillation  Pulmonary hypertension  Aortic stenosis  Degenerative disc disease  Compression fracture L5 vertebra  Chronic kidney disease stage IV  Chronic kidney retention with Hoffman catheter    PLAN  CPM  Continue IV steroids and start Rituxan  Hematology consult appreciated  Adjust home medications  Stress ulcer DVT prophylaxis  Supportive care  PT OT  DNR  Discussed with family and nursing staff  Follow closely and further recommendation current hospital course    KADEEM RIVAS MD    Copied text in this note has been reviewed and is accurate as of 08/02/23

## 2023-08-02 NOTE — PROGRESS NOTES
Subjective     CHIEF COMPLAINT:     Thrombocytopenia    INTERVAL HISTORY:     Patient was seen with daughter at bedside.  She is complaining of rash over the left side of her abdomen.  It is becoming painful.  She complained of back pain and she had difficulty sleeping last night.    REVIEW OF SYSTEMS:  A comprehensive review of systems was obtained with pertinent positive findings as noted in the interval history above.  All other systems negative.    SCHEDULED MEDS:  budesonide-formoterol, 2 puff, Inhalation, BID  carvedilol, 12.5 mg, Oral, BID With Meals  cholecalciferol, 1,000 Units, Oral, Daily  cyanocobalamin, 1,000 mcg, Intramuscular, Daily  gabapentin, 600 mg, Oral, Q12H  guaiFENesin, 600 mg, Oral, Q12H  hydrALAZINE, 50 mg, Oral, TID  insulin glargine, 15 Units, Subcutaneous, Nightly  insulin lispro, 2-7 Units, Subcutaneous, 4x Daily AC & at Bedtime  insulin lispro, 5 Units, Subcutaneous, TID With Meals  levothyroxine, 25 mcg, Oral, QAM  LORazepam, 1 mg, Oral, Q PM  methylPREDNISolone sodium succinate, 80 mg, Intravenous, Daily  montelukast, 10 mg, Oral, Nightly  pantoprazole, 40 mg, Oral, Q AM  tamsulosin, 0.4 mg, Oral, Daily      Objective   VITAL SIGNS:  Temp:  [97.5 øF (36.4 øC)-98.2 øF (36.8 øC)] 97.5 øF (36.4 øC)  Heart Rate:  [57-64] 62  Resp:  [16-17] 16  BP: (141-166)/(51-69) 141/64     PHYSICAL EXAMINATION:  GENERAL:  The patient appears in fair general condition, not in acute distress.  SKIN: Ecchymosis over the forearms bilaterally.  Skin rash over the left side of the abdomen-bandlike with vesicular changes.  HEAD:  Normocephalic.  EYES:  No Jaundice. Pallor.   NECK:  Supple. No Masses.  LYMPHATICS:  No cervical or supraclavicular lymphadenopathy.  CHEST: Normal respiratory effort. Lungs clear to auscultation.   CARDIAC:  Normal S1 & S2. No murmur.   ABDOMEN:  Soft. No tenderness. No Hepatomegaly. No Splenomegaly.   EXTREMITIES:  No noted deformity.   PSYCH: Normal mood and affect.     RESULT  REVIEW:   Results from last 7 days   Lab Units 08/02/23  0544 08/01/23  0934 07/31/23  1920   WBC 10*3/mm3 8.74 6.27 8.13   NEUTROS ABS 10*3/mm3 7.24* 5.42 5.81   HEMOGLOBIN g/dL 10.9* 11.8* 12.1   HEMATOCRIT % 33.1* 35.9 35.9   PLATELETS 10*3/mm3 28* 16*  16* 17*     Results from last 7 days   Lab Units 08/02/23  0544 07/31/23  1920   SODIUM mmol/L 125* 128*   POTASSIUM mmol/L 4.1 4.0   CHLORIDE mmol/L 89* 91*   CO2 mmol/L 23.3 25.1   BUN mg/dL 50* 38*   CREATININE mg/dL 2.02* 1.85*   CALCIUM mg/dL 8.7 9.0   ALBUMIN g/dL 3.3* 3.6   BILIRUBIN mg/dL 0.4 0.3   ALK PHOS U/L 29* 33*   ALT (SGPT) U/L 11 14   AST (SGOT) U/L 15 17     Results from last 7 days   Lab Units 07/31/23  1920   INR  1.15*   APTT seconds 25.1         Lab 08/01/23  0934   FOLATE 14.50   VITAMIN B 12 287      Assessment & Plan   *Severe thrombocytopenia.  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  She has chronic liver disease which is likely contributing.  Spleen was not enlarged on CT on 5/1/2023.  She was found to have a new bruise over the abdomen with no preceding trauma.  7/31/2023: Platelet count 17,000.  Patient was given Solu-Medrol 125 mg IV.  8/1/2023: Platelet count 16,000.  IPF: 15.3%.  7/31/2023: PT and PTT were normal.  8/2/2023: Platelet count 28,000.     *Vitamin B12 deficiency.  Vitamin B12 was 236 on 3/12/2023.  She has not been taking vitamin B12.  8/1/2023: Vitamin B12 287.  Patient started on vitamin B12 injections daily.     *Mild folate deficiency.  Folate level was 5.94 on 3/12/2023.  8/1/2023: Folate 14.5.     *Chronic kidney disease, stage 4.  7/31/2023: Creatinine 1.85.  8/2/2023: Creatinine 2.02.    *Shingles affecting the left side of the abdomen.     PLAN:     1.  I discussed the case with Dr. Sherman.  Given her severe thrombocytopenia and the tendency to have relapse of the ITP, we recommended treatment with Rituxan.  I explained the treatment to the patient and her daughter including the  possibility of infusion reaction specially with the first dose.  They agreed to proceed.  2.  We will continue Solu-Medrol IV for now.  Plan to taper the dose in the future and ultimately discontinue it.  3.  Hepatitis B serology will be obtained.  4.  We will start Valacyclovir 1 g every 24 hours-dose reduced due to decreased kidney function.  5.  Transfer to 3P oncology floor.    Discussed with daughter at bedside.  Discussed with Geri Brandt.D.    I spent 60 minutes taking care of the patient today including history and exam, review of labs, communicating with staff and coordinating care.      Tomer Jeong MD  08/02/23

## 2023-08-02 NOTE — CONSULTS
"8/2/23: Consult to Infection Prevention nurse received.   Reason for Consult:  Disseminated Shingles.   Reviewed with the RN caring for the patient.  She has complained of back pain and a painful rash over the L side of her abd.   PMH reviewed and she is noted to be neurologically intact.  In consult with Dr. Terri Caruso, as long as the vesicles are in a dermatomal distribution and not diffuse or involving multiple dermatomes, continue with Standard Precautions and post the yellow \"Chickenpox\" sign outside the room.  Keep the area covered if the patient need to come out of the room for therapy or tranport.    Monitor vesicles daily and consult again with Infection Control with any concerns for spread which could warrant Contact and Airborne Isolation Precautions if the distribution changes beyond the current dermatone(s).  Dasia BRITTN, RN, CIC     "

## 2023-08-02 NOTE — PLAN OF CARE
Goal Outcome Evaluation:  Plan of Care Reviewed With: patient, daughter           Outcome Evaluation: Pt is a 89 y/o F admitted with thrombocytopenia. Hx macular degeneration- legally blind. She lives with family and has 24/7 assist for ADLs and fxl mobility. Use of RW for short distances and reports she walks ~20 ft at baseline. Today she sits EOB with SBA, dec fxl reach for LB dressing task, MaxA to don socks prior to mobility, SBA for UB dressing. STS with CGA and use of RW for short ambulation in the room to simulate household mobilty. Verbal and tactile cues for guidance around room. Demo's kyphotic posture in standing and reports this is baseline. She fatigues quickly, further mobility into bathroom deferred d/t dec endurance. She is assisted to chair, left UIC with family present, all needs w/in reach.      Anticipated Discharge Disposition (OT): home with 24/7 care

## 2023-08-02 NOTE — PLAN OF CARE
Goal Outcome Evaluation:  Plan of Care Reviewed With: (P) patient, daughter           Outcome Evaluation: (P) Pt seen for PT today after being admitted for further workup for abnormal labs and increased back pain. Pt minAx1 for all be mobility and ambulation. CGA for STS transfers. Pt was able to tolerate ambuating 12' with the use of RW. Pt demonstrates a significant forward flexed posture, decreased gait speed, narrow base of support, and decreased stride length. Pt fatigues easily due to decrease in overall endurance and strength. Pt will continue to benefit from skilled PT interventions to address functional mobility. Rec home with 24 hour care and HH at d/c.      Anticipated Discharge Disposition (PT): (P) home with 24/7 care, home with home health

## 2023-08-03 LAB
ANION GAP SERPL CALCULATED.3IONS-SCNC: 9.4 MMOL/L (ref 5–15)
BASOPHILS # BLD AUTO: 0 10*3/MM3 (ref 0–0.2)
BASOPHILS NFR BLD AUTO: 0 % (ref 0–1.5)
BUN SERPL-MCNC: 50 MG/DL (ref 8–23)
BUN/CREAT SERPL: 26.6 (ref 7–25)
CALCIUM SPEC-SCNC: 8.5 MG/DL (ref 8.2–9.6)
CHLORIDE SERPL-SCNC: 97 MMOL/L (ref 98–107)
CO2 SERPL-SCNC: 23.6 MMOL/L (ref 22–29)
CREAT SERPL-MCNC: 1.88 MG/DL (ref 0.57–1)
DEPRECATED RDW RBC AUTO: 51.2 FL (ref 37–54)
EGFRCR SERPLBLD CKD-EPI 2021: 24.8 ML/MIN/1.73
EOSINOPHIL # BLD AUTO: 0 10*3/MM3 (ref 0–0.4)
EOSINOPHIL NFR BLD AUTO: 0 % (ref 0.3–6.2)
ERYTHROCYTE [DISTWIDTH] IN BLOOD BY AUTOMATED COUNT: 16.8 % (ref 12.3–15.4)
GLUCOSE BLDC GLUCOMTR-MCNC: 323 MG/DL (ref 70–130)
GLUCOSE BLDC GLUCOMTR-MCNC: 332 MG/DL (ref 70–130)
GLUCOSE BLDC GLUCOMTR-MCNC: 347 MG/DL (ref 70–130)
GLUCOSE SERPL-MCNC: 234 MG/DL (ref 65–99)
HBV SURFACE AB SER RIA-ACNC: REACTIVE
HBV SURFACE AG SERPL QL IA: NORMAL
HCT VFR BLD AUTO: 32.8 % (ref 34–46.6)
HGB BLD-MCNC: 11 G/DL (ref 12–15.9)
LYMPHOCYTES # BLD AUTO: 1.04 10*3/MM3 (ref 0.7–3.1)
LYMPHOCYTES NFR BLD AUTO: 13 % (ref 19.6–45.3)
MCH RBC QN AUTO: 28.1 PG (ref 26.6–33)
MCHC RBC AUTO-ENTMCNC: 33.5 G/DL (ref 31.5–35.7)
MCV RBC AUTO: 83.7 FL (ref 79–97)
MONOCYTES # BLD AUTO: 0.6 10*3/MM3 (ref 0.1–0.9)
MONOCYTES NFR BLD AUTO: 7.5 % (ref 5–12)
NEUTROPHILS NFR BLD AUTO: 6.32 10*3/MM3 (ref 1.7–7)
NEUTROPHILS NFR BLD AUTO: 78.6 % (ref 42.7–76)
PLATELET # BLD AUTO: 39 10*3/MM3 (ref 140–450)
PMV BLD AUTO: 11.9 FL (ref 6–12)
POTASSIUM SERPL-SCNC: 4.2 MMOL/L (ref 3.5–5.2)
RBC # BLD AUTO: 3.92 10*6/MM3 (ref 3.77–5.28)
SODIUM SERPL-SCNC: 130 MMOL/L (ref 136–145)
WBC NRBC COR # BLD: 8.03 10*3/MM3 (ref 3.4–10.8)

## 2023-08-03 PROCEDURE — 94799 UNLISTED PULMONARY SVC/PX: CPT

## 2023-08-03 PROCEDURE — 63710000001 INSULIN LISPRO (HUMAN) PER 5 UNITS: Performed by: HOSPITALIST

## 2023-08-03 PROCEDURE — 82948 REAGENT STRIP/BLOOD GLUCOSE: CPT

## 2023-08-03 PROCEDURE — 85025 COMPLETE CBC W/AUTO DIFF WBC: CPT | Performed by: HOSPITALIST

## 2023-08-03 PROCEDURE — 87340 HEPATITIS B SURFACE AG IA: CPT | Performed by: INTERNAL MEDICINE

## 2023-08-03 PROCEDURE — 25010000002 METHYLPREDNISOLONE PER 125 MG: Performed by: INTERNAL MEDICINE

## 2023-08-03 PROCEDURE — 63710000001 INSULIN GLARGINE PER 5 UNITS: Performed by: HOSPITALIST

## 2023-08-03 PROCEDURE — 94664 DEMO&/EVAL PT USE INHALER: CPT

## 2023-08-03 PROCEDURE — 86706 HEP B SURFACE ANTIBODY: CPT | Performed by: INTERNAL MEDICINE

## 2023-08-03 PROCEDURE — 25010000002 CYANOCOBALAMIN PER 1000 MCG: Performed by: INTERNAL MEDICINE

## 2023-08-03 PROCEDURE — 80048 BASIC METABOLIC PNL TOTAL CA: CPT | Performed by: HOSPITALIST

## 2023-08-03 PROCEDURE — 86704 HEP B CORE ANTIBODY TOTAL: CPT | Performed by: INTERNAL MEDICINE

## 2023-08-03 PROCEDURE — 99232 SBSQ HOSP IP/OBS MODERATE 35: CPT | Performed by: INTERNAL MEDICINE

## 2023-08-03 RX ORDER — ACETAMINOPHEN 325 MG/1
650 TABLET ORAL ONCE
Status: CANCELLED | OUTPATIENT
Start: 2023-08-26

## 2023-08-03 RX ORDER — SODIUM CHLORIDE 9 MG/ML
250 INJECTION, SOLUTION INTRAVENOUS ONCE
Status: CANCELLED | OUTPATIENT
Start: 2023-08-12

## 2023-08-03 RX ORDER — DIPHENHYDRAMINE HYDROCHLORIDE 50 MG/ML
50 INJECTION INTRAMUSCULAR; INTRAVENOUS AS NEEDED
Status: CANCELLED | OUTPATIENT
Start: 2023-08-26

## 2023-08-03 RX ORDER — ACETAMINOPHEN 325 MG/1
650 TABLET ORAL ONCE
Status: CANCELLED | OUTPATIENT
Start: 2023-08-19

## 2023-08-03 RX ORDER — ACETAMINOPHEN 325 MG/1
650 TABLET ORAL ONCE
Status: CANCELLED | OUTPATIENT
Start: 2023-08-12

## 2023-08-03 RX ORDER — FAMOTIDINE 10 MG/ML
20 INJECTION, SOLUTION INTRAVENOUS AS NEEDED
Status: CANCELLED | OUTPATIENT
Start: 2023-08-12

## 2023-08-03 RX ORDER — FAMOTIDINE 10 MG/ML
20 INJECTION, SOLUTION INTRAVENOUS ONCE
Status: CANCELLED | OUTPATIENT
Start: 2023-08-26

## 2023-08-03 RX ORDER — DIPHENHYDRAMINE HYDROCHLORIDE 50 MG/ML
50 INJECTION INTRAMUSCULAR; INTRAVENOUS AS NEEDED
Status: CANCELLED | OUTPATIENT
Start: 2023-08-12

## 2023-08-03 RX ORDER — FAMOTIDINE 10 MG/ML
20 INJECTION, SOLUTION INTRAVENOUS ONCE
Status: CANCELLED | OUTPATIENT
Start: 2023-08-19

## 2023-08-03 RX ORDER — SODIUM CHLORIDE 9 MG/ML
250 INJECTION, SOLUTION INTRAVENOUS ONCE
Status: CANCELLED | OUTPATIENT
Start: 2023-08-19

## 2023-08-03 RX ORDER — INSULIN LISPRO 100 [IU]/ML
10 INJECTION, SOLUTION INTRAVENOUS; SUBCUTANEOUS
Status: DISCONTINUED | OUTPATIENT
Start: 2023-08-03 | End: 2023-08-04

## 2023-08-03 RX ORDER — FAMOTIDINE 10 MG/ML
20 INJECTION, SOLUTION INTRAVENOUS ONCE
Status: CANCELLED | OUTPATIENT
Start: 2023-08-12

## 2023-08-03 RX ORDER — METHYLPREDNISOLONE SODIUM SUCCINATE 125 MG/2ML
60 INJECTION, POWDER, LYOPHILIZED, FOR SOLUTION INTRAMUSCULAR; INTRAVENOUS DAILY
Status: DISCONTINUED | OUTPATIENT
Start: 2023-08-04 | End: 2023-08-05

## 2023-08-03 RX ORDER — MEPERIDINE HYDROCHLORIDE 25 MG/ML
12.5 INJECTION INTRAMUSCULAR; INTRAVENOUS; SUBCUTANEOUS
Status: CANCELLED | OUTPATIENT
Start: 2023-08-12

## 2023-08-03 RX ORDER — FLUCONAZOLE 150 MG/1
150 TABLET ORAL ONCE
Status: COMPLETED | OUTPATIENT
Start: 2023-08-03 | End: 2023-08-03

## 2023-08-03 RX ORDER — DIPHENHYDRAMINE HYDROCHLORIDE 50 MG/ML
50 INJECTION INTRAMUSCULAR; INTRAVENOUS AS NEEDED
Status: CANCELLED | OUTPATIENT
Start: 2023-08-19

## 2023-08-03 RX ORDER — FAMOTIDINE 10 MG/ML
20 INJECTION, SOLUTION INTRAVENOUS AS NEEDED
Status: CANCELLED | OUTPATIENT
Start: 2023-08-19

## 2023-08-03 RX ORDER — FAMOTIDINE 10 MG/ML
20 INJECTION, SOLUTION INTRAVENOUS AS NEEDED
Status: CANCELLED | OUTPATIENT
Start: 2023-08-26

## 2023-08-03 RX ORDER — SODIUM CHLORIDE 9 MG/ML
250 INJECTION, SOLUTION INTRAVENOUS ONCE
Status: CANCELLED | OUTPATIENT
Start: 2023-08-26

## 2023-08-03 RX ORDER — MEPERIDINE HYDROCHLORIDE 25 MG/ML
12.5 INJECTION INTRAMUSCULAR; INTRAVENOUS; SUBCUTANEOUS
Status: CANCELLED | OUTPATIENT
Start: 2023-08-19

## 2023-08-03 RX ORDER — MEPERIDINE HYDROCHLORIDE 25 MG/ML
12.5 INJECTION INTRAMUSCULAR; INTRAVENOUS; SUBCUTANEOUS
Status: CANCELLED | OUTPATIENT
Start: 2023-08-26

## 2023-08-03 RX ADMIN — INSULIN LISPRO 5 UNITS: 100 INJECTION, SOLUTION INTRAVENOUS; SUBCUTANEOUS at 11:21

## 2023-08-03 RX ADMIN — SENNOSIDES AND DOCUSATE SODIUM 2 TABLET: 50; 8.6 TABLET ORAL at 08:41

## 2023-08-03 RX ADMIN — CYANOCOBALAMIN 1000 MCG: 1000 INJECTION, SOLUTION INTRAMUSCULAR; SUBCUTANEOUS at 08:44

## 2023-08-03 RX ADMIN — LEVOTHYROXINE SODIUM 25 MCG: 0.03 TABLET ORAL at 08:41

## 2023-08-03 RX ADMIN — TAMSULOSIN HYDROCHLORIDE 0.4 MG: 0.4 CAPSULE ORAL at 08:43

## 2023-08-03 RX ADMIN — Medication 1000 UNITS: at 08:41

## 2023-08-03 RX ADMIN — BUDESONIDE AND FORMOTEROL FUMARATE DIHYDRATE 2 PUFF: 160; 4.5 AEROSOL RESPIRATORY (INHALATION) at 22:10

## 2023-08-03 RX ADMIN — INSULIN LISPRO 5 UNITS: 100 INJECTION, SOLUTION INTRAVENOUS; SUBCUTANEOUS at 21:12

## 2023-08-03 RX ADMIN — VALACYCLOVIR HYDROCHLORIDE 1000 MG: 500 TABLET, FILM COATED ORAL at 08:42

## 2023-08-03 RX ADMIN — METHYLPREDNISOLONE SODIUM SUCCINATE 80 MG: 125 INJECTION, POWDER, FOR SOLUTION INTRAMUSCULAR; INTRAVENOUS at 08:43

## 2023-08-03 RX ADMIN — SODIUM CHLORIDE 75 ML/HR: 9 INJECTION, SOLUTION INTRAVENOUS at 08:53

## 2023-08-03 RX ADMIN — HYDROCODONE BITARTRATE AND ACETAMINOPHEN 1 TABLET: 7.5; 325 TABLET ORAL at 11:21

## 2023-08-03 RX ADMIN — INSULIN LISPRO 3 UNITS: 100 INJECTION, SOLUTION INTRAVENOUS; SUBCUTANEOUS at 08:45

## 2023-08-03 RX ADMIN — PANTOPRAZOLE SODIUM 40 MG: 40 TABLET, DELAYED RELEASE ORAL at 05:34

## 2023-08-03 RX ADMIN — LORAZEPAM 1 MG: 1 TABLET ORAL at 17:29

## 2023-08-03 RX ADMIN — CARVEDILOL 6.25 MG: 6.25 TABLET, FILM COATED ORAL at 08:42

## 2023-08-03 RX ADMIN — GUAIFENESIN 600 MG: 600 TABLET, EXTENDED RELEASE ORAL at 21:11

## 2023-08-03 RX ADMIN — GABAPENTIN 600 MG: 300 CAPSULE ORAL at 08:41

## 2023-08-03 RX ADMIN — HYDROCODONE BITARTRATE AND ACETAMINOPHEN 1 TABLET: 7.5; 325 TABLET ORAL at 18:50

## 2023-08-03 RX ADMIN — GUAIFENESIN 600 MG: 600 TABLET, EXTENDED RELEASE ORAL at 08:42

## 2023-08-03 RX ADMIN — INSULIN GLARGINE 30 UNITS: 100 INJECTION, SOLUTION SUBCUTANEOUS at 21:11

## 2023-08-03 RX ADMIN — HYDRALAZINE HYDROCHLORIDE 75 MG: 50 TABLET, FILM COATED ORAL at 08:41

## 2023-08-03 RX ADMIN — INSULIN LISPRO 7 UNITS: 100 INJECTION, SOLUTION INTRAVENOUS; SUBCUTANEOUS at 11:21

## 2023-08-03 RX ADMIN — INSULIN LISPRO 7 UNITS: 100 INJECTION, SOLUTION INTRAVENOUS; SUBCUTANEOUS at 08:53

## 2023-08-03 RX ADMIN — FLUCONAZOLE 150 MG: 150 TABLET ORAL at 14:27

## 2023-08-03 RX ADMIN — INSULIN LISPRO 10 UNITS: 100 INJECTION, SOLUTION INTRAVENOUS; SUBCUTANEOUS at 17:30

## 2023-08-03 RX ADMIN — INSULIN LISPRO 5 UNITS: 100 INJECTION, SOLUTION INTRAVENOUS; SUBCUTANEOUS at 17:30

## 2023-08-03 RX ADMIN — GABAPENTIN 600 MG: 300 CAPSULE ORAL at 21:11

## 2023-08-03 RX ADMIN — BUDESONIDE AND FORMOTEROL FUMARATE DIHYDRATE 2 PUFF: 160; 4.5 AEROSOL RESPIRATORY (INHALATION) at 08:05

## 2023-08-03 RX ADMIN — MONTELUKAST SODIUM 10 MG: 10 TABLET, FILM COATED ORAL at 21:11

## 2023-08-03 RX ADMIN — HYDROCODONE BITARTRATE AND ACETAMINOPHEN 1 TABLET: 7.5; 325 TABLET ORAL at 23:42

## 2023-08-03 NOTE — PLAN OF CARE
Goal Outcome Evaluation:  Plan of Care Reviewed With: patient        Progress: improving  Outcome Evaluation: Patient alert and oriented, on room air, tolerating regular diet, normal saline infusing 50ml/hr, ambulating with assist x1-2 encouraged to be out of bed and in chair for meals, shingles site on right side being monitored closely currently scabbed, norco given once for back pain, incontinent care provided as needed, IV steroids continued per MAR/order, blood sugars monitored and treated, vitals stable, plan of care continued

## 2023-08-03 NOTE — PROGRESS NOTES
Subjective     CHIEF COMPLAINT:     Thrombocytopenia    INTERVAL HISTORY:     Patient complained of increasing pain in the area of the shingles in the left abdomen.  No new areas of skin rash.  She is having symptoms of vaginal yeast infection and is due for second dose of Diflucan today.    REVIEW OF SYSTEMS:  A comprehensive review of systems was obtained with pertinent positive findings as noted in the interval history above.  All other systems negative.    SCHEDULED MEDS:  budesonide-formoterol, 2 puff, Inhalation, BID  carvedilol, 6.25 mg, Oral, BID With Meals  cholecalciferol, 1,000 Units, Oral, Daily  cyanocobalamin, 1,000 mcg, Intramuscular, Daily  gabapentin, 600 mg, Oral, Q12H  guaiFENesin, 600 mg, Oral, Q12H  hydrALAZINE, 75 mg, Oral, TID  insulin glargine, 20 Units, Subcutaneous, Nightly  insulin lispro, 2-7 Units, Subcutaneous, 4x Daily AC & at Bedtime  insulin lispro, 7 Units, Subcutaneous, TID With Meals  levothyroxine, 25 mcg, Oral, QAM  LORazepam, 1 mg, Oral, Q PM  methylPREDNISolone sodium succinate, 80 mg, Intravenous, Daily  montelukast, 10 mg, Oral, Nightly  pantoprazole, 40 mg, Oral, Q AM  senna-docusate sodium, 2 tablet, Oral, BID  tamsulosin, 0.4 mg, Oral, Daily  valACYclovir, 1,000 mg, Oral, Q24H      Objective   VITAL SIGNS:  Temp:  [97.7 øF (36.5 øC)-97.9 øF (36.6 øC)] 97.7 øF (36.5 øC)  Heart Rate:  [57-62] 62  Resp:  [16-20] 16  BP: (140-162)/(52-53) 144/52     PHYSICAL EXAMINATION:   GENERAL:  The patient appears in fair general condition, not in acute distress.  SKIN: Old ecchymosis over the forearms.  Area of shingles over the left abdomen with vesicular changes in the posterior aspect of the rash.  HEAD:  Normocephalic.  EYES: Pallor.  No jaundice.  NECK:  Supple. No Masses.  LYMPHATICS:  No cervical or supraclavicular lymphadenopathy.  CHEST: Normal respiratory effort. Lungs clear to auscultation.   CARDIAC:  Normal S1 & S2. No murmur.   ABDOMEN:  Soft. No tenderness. No  Hepatomegaly. No Splenomegaly.   EXTREMITIES:  No noted deformity.   PSYCH: Normal mood and affect.     RESULT REVIEW:   Results from last 7 days   Lab Units 08/03/23  0712 08/02/23  0544 08/01/23  0934 07/31/23 1920   WBC 10*3/mm3 8.03 8.74 6.27 8.13   NEUTROS ABS 10*3/mm3 6.32 7.24* 5.42 5.81   HEMOGLOBIN g/dL 11.0* 10.9* 11.8* 12.1   HEMATOCRIT % 32.8* 33.1* 35.9 35.9   PLATELETS 10*3/mm3 39* 28* 16*  16* 17*     Results from last 7 days   Lab Units 08/03/23  0712 08/02/23  0544 07/31/23 1920   SODIUM mmol/L 130* 125* 128*   POTASSIUM mmol/L 4.2 4.1 4.0   CHLORIDE mmol/L 97* 89* 91*   CO2 mmol/L 23.6 23.3 25.1   BUN mg/dL 50* 50* 38*   CREATININE mg/dL 1.88* 2.02* 1.85*   CALCIUM mg/dL 8.5 8.7 9.0   ALBUMIN g/dL  --  3.3* 3.6   BILIRUBIN mg/dL  --  0.4 0.3   ALK PHOS U/L  --  29* 33*   ALT (SGPT) U/L  --  11 14   AST (SGOT) U/L  --  15 17     Results from last 7 days   Lab Units 07/31/23 1920   INR  1.15*   APTT seconds 25.1         Lab 08/01/23  0934   FOLATE 14.50   VITAMIN B 12 287      Assessment & Plan   *Severe thrombocytopenia.  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  She has chronic liver disease which is likely contributing.  Spleen was not enlarged on CT on 5/1/2023.  She was found to have a new bruise over the abdomen with no preceding trauma.  7/31/2023: Platelet count 17,000.  Patient was given Solu-Medrol 125 mg IV.  8/1/2023: Platelet count 16,000.  IPF: 15.3%.  7/31/2023: PT and PTT were normal.  8/2/2023: Platelet count 28,000.  8/3/2023: Platelet count 39,000.     *Vitamin B12 deficiency.  Vitamin B12 was 236 on 3/12/2023.  She has not been taking vitamin B12.  8/1/2023: Vitamin B12 287.  Patient started on vitamin B12 injections daily.  8/3/2023: Hemoglobin 11.0.     *Chronic kidney disease, stage 4.  7/31/2023: Creatinine 1.85.  8/2/2023: Creatinine 2.02.  8/3/2023: Creatinine 1.88.    *Shingles affecting the left side of the abdomen.  She was started on  valacyclovir 1 g once daily-dose reduced due to decreased kidney function.  Exam today shows some worsening in the posterior aspect of the skin rash.    *Vaginal yeast infection  Patient had a recent UTI and was treated with levofloxacin.  Patient developed vaginal yeast infection following the antibiotic therapy and was given 1 dose of Diflucan last week.    She is due for second dose today.     PLAN:     1.  We will hold off on starting Rituxan today.  Plan to start tomorrow.  2.  Continue IV Solu-Medrol but reduce the dose to 60 mg daily.  3.  Continue valacyclovir 1 g daily.  4.  We will give the patient's planned second dose of Diflucan for vaginal yeast infection.      Discussed with patient's daughter.  Discussed with Dr. Arnold and with the patient's RN.      Tomer Jeong MD  08/03/23

## 2023-08-03 NOTE — PROGRESS NOTES
"Daily progress note    Primary care physician      Chief complaint  Doing same with no new complaints and family at bedside.    History of present illness  89-year-old white female with very complex past medical history and well-known to our service including ITP chronic low back pain with L5 vertebral body fracture degenerative disease paroxysmal atrial fibrillation DVT diabetes hypertension hyperlipidemia hypothyroidism congestive heart failure and chronic kidney disease stage III who lives at home with her daughter brought to the emergency room with generalized weakness and bruises all over.  Patient work-up in ER revealed worsening thrombocytopenia admitted for management.  Patient denies any fever chills chest pain shortness of breath abdominal pain nausea vomiting diarrhea.  Patient is DNR per her wishes.     REVIEW OF SYSTEMS  Unremarkable except severe weakness     PHYSICAL EXAM  Blood pressure 156/83, pulse 62, temperature 97.7 øF (36.5 øC), temperature source Oral, resp. rate 16, height 144.8 cm (57.01\"), weight 77.9 kg (171 lb 11.8 oz), SpO2 96 %.    Constitutional:       General: She is not in acute distress.     Appearance: She is well-developed.   HENT:      Head: Normocephalic and atraumatic.   Eyes:      Extraocular Movements: Extraocular movements intact.   Cardiovascular:      Rate and Rhythm: Normal rate and regular rhythm.      Heart sounds: Normal heart sounds.   Pulmonary:      Effort: Pulmonary effort is normal.      Breath sounds: Normal breath sounds.   Abdominal:      General: There is no distension.      Comments: Erythematous macular clusters noted along the left flank   Genitourinary:     Comments: Indwelling Hoffman catheter  Skin:     General: Skin is warm.   Neurological:      General: No focal deficit present.      Mental Status: She is alert and oriented to person, place, and time.   Psychiatric:         Mood and Affect: Mood normal.      LAB RESULTS  Lab Results (last 24 " hours)       Procedure Component Value Units Date/Time    POC Glucose Once [[id number]]  (Abnormal) Collected: 08/03/23 1109    Specimen: Blood Updated: 08/03/23 1114     Glucose 332 mg/dL      Comment: Meter: [id number] : [id number] Sera JOHNSON       Hepatitis B Surface Antigen [[id number]]  (Normal) Collected: 08/03/23 0932    Specimen: Blood Updated: 08/03/23 1025     Hepatitis B Surface Ag Non-Reactive    Hepatitis B Surface Antibody [[id number]]  (Abnormal) Collected: 08/03/23 0932    Specimen: Blood Updated: 08/03/23 1023     Hep B S Ab Reactive    Narrative:      Non-Reactive - Individual is considered to be not immune to infection with HBV.    Equivocal - Unable to determine if anti-HBs is present at levels consistent with immunity. The individual should be further assessed by associated risk factors and the use of additional diagnositc information, or another sample may be collected and tested.    Reactive - Anti-HBs concentration detected at >10 mIU/mL. Individual is considered to be immune to infection with HBV.      Results may be falsely decreased if patient taking Biotin.      Hepatitis B Core Antibody, Total [[id number]] Collected: 08/03/23 0932    Specimen: Blood Updated: 08/03/23 0949    Basic Metabolic Panel [[id number]]  (Abnormal) Collected: 08/03/23 0712    Specimen: Blood Updated: 08/03/23 0817     Glucose 234 mg/dL      BUN 50 mg/dL      Creatinine 1.88 mg/dL      Sodium 130 mmol/L      Potassium 4.2 mmol/L      Chloride 97 mmol/L      CO2 23.6 mmol/L      Calcium 8.5 mg/dL      BUN/Creatinine Ratio 26.6     Anion Gap 9.4 mmol/L      eGFR 24.8 mL/min/1.73     Narrative:      GFR Normal >60  Chronic Kidney Disease <60  Kidney Failure <15    The GFR formula is only valid for adults with stable renal function between ages 18 and 70.    CBC & Differential [[id number]]  (Abnormal) Collected: 08/03/23 0712    Specimen: Blood Updated: 08/03/23 0807    Narrative:      The following orders were  created for panel order CBC & Differential.  Procedure                               Abnormality         Status                     ---------                               -----------         ------                     CBC Auto Differential[[id number]]        Abnormal            Final result                 Please view results for these tests on the individual orders.    CBC Auto Differential [[id number]]  (Abnormal) Collected: 08/03/23 0712    Specimen: Blood Updated: 08/03/23 0807     WBC 8.03 10*3/mm3      RBC 3.92 10*6/mm3      Hemoglobin 11.0 g/dL      Hematocrit 32.8 %      MCV 83.7 fL      MCH 28.1 pg      MCHC 33.5 g/dL      RDW 16.8 %      RDW-SD 51.2 fl      MPV 11.9 fL      Platelets 39 10*3/mm3      Neutrophil % 78.6 %      Lymphocyte % 13.0 %      Monocyte % 7.5 %      Eosinophil % 0.0 %      Basophil % 0.0 %      Neutrophils, Absolute 6.32 10*3/mm3      Lymphocytes, Absolute 1.04 10*3/mm3      Monocytes, Absolute 0.60 10*3/mm3      Eosinophils, Absolute 0.00 10*3/mm3      Basophils, Absolute 0.00 10*3/mm3     POC Glucose Once [[id number]]  (Abnormal) Collected: 08/02/23 2040    Specimen: Blood Updated: 08/02/23 2044     Glucose 413 mg/dL      Comment: Result Not Confirmed Meter: [id number] : [id number] Keron JOHNSON       POC Glucose Once [[id number]]  (Abnormal) Collected: 08/02/23 1724    Specimen: Blood Updated: 08/02/23 1726     Glucose 414 mg/dL      Comment: Treated Patient Meter: [id number] : [id number] Walter JOHNSON             Imaging Results (Last 24 Hours)       ** No results found for the last 24 hours. **            Current Facility-Administered Medications:     budesonide-formoterol (SYMBICORT) 160-4.5 MCG/ACT inhaler 2 puff, 2 puff, Inhalation, BID, Fred Jeffrey MD, 2 puff at 08/03/23 0805    carvedilol (COREG) tablet 6.25 mg, 6.25 mg, Oral, BID With Meals, Mango Arnold MD, 6.25 mg at 08/03/23 0842    cholecalciferol (VITAMIN D3) tablet 1,000 Units, 1,000 Units, Oral,  Daily, Fred Jeffrey MD, 1,000 Units at 08/03/23 0841    cyanocobalamin injection 1,000 mcg, 1,000 mcg, Intramuscular, Daily, Tomer Jeong MD, 1,000 mcg at 08/03/23 0844    dextrose (D50W) (25 g/50 mL) IV injection 25 g, 25 g, Intravenous, Q15 Min PRN, Mango Arnold MD    dextrose (GLUTOSE) oral gel 15 g, 15 g, Oral, Q15 Min PRN, Mango Arnold MD    gabapentin (NEURONTIN) capsule 600 mg, 600 mg, Oral, Q12H, Fred Jeffrey MD, 600 mg at 08/03/23 0841    glucagon (GLUCAGEN) injection 1 mg, 1 mg, Intramuscular, Q15 Min PRN, Mango Arnold MD    guaiFENesin (MUCINEX) 12 hr tablet 600 mg, 600 mg, Oral, Q12H, Mango Arnold MD, 600 mg at 08/03/23 0842    hydrALAZINE (APRESOLINE) tablet 75 mg, 75 mg, Oral, TID, Mango Arnold MD, 75 mg at 08/03/23 0841    HYDROcodone-acetaminophen (NORCO) 7.5-325 MG per tablet 1 tablet, 1 tablet, Oral, Q4H PRN, Mango Arnold MD, 1 tablet at 08/03/23 1121    insulin glargine (LANTUS, SEMGLEE) injection 20 Units, 20 Units, Subcutaneous, Nightly, Mango Arnold MD, 20 Units at 08/02/23 2046    insulin lispro (HUMALOG/ADMELOG) injection 2-7 Units, 2-7 Units, Subcutaneous, 4x Daily AC & at Bedtime, Mango Arnold MD, 5 Units at 08/03/23 1121    insulin lispro (HUMALOG/ADMELOG) injection 7 Units, 7 Units, Subcutaneous, TID With Meals, Mango Arnold MD, 7 Units at 08/03/23 1121    levothyroxine (SYNTHROID, LEVOTHROID) tablet 25 mcg, 25 mcg, Oral, QAM, Fred Jeffrey MD, 25 mcg at 08/03/23 0841    LORazepam (ATIVAN) tablet 1 mg, 1 mg, Oral, Q PM, Fred Jeffrey MD, 1 mg at 08/02/23 1725    [START ON 8/4/2023] methylPREDNISolone sodium succinate (SOLU-Medrol) injection 60 mg, 60 mg, Intravenous, Daily, Tomer Jeong MD    montelukast (SINGULAIR) tablet 10 mg, 10 mg, Oral, Nightly, Fred Jeffrey MD, 10 mg at 08/02/23 2046    ondansetron (ZOFRAN) injection 4 mg, 4 mg, Intravenous, Q6H PRN, Fred Jeffrey MD, 4 mg at 08/01/23 2044    pantoprazole (PROTONIX) EC tablet 40 mg,  40 mg, Oral, Q AM, Fred Jeffrye MD, 40 mg at 08/03/23 0534    sennosides-docusate (PERICOLACE) 8.6-50 MG per tablet 2 tablet, 2 tablet, Oral, BID, Kadeem Arnold MD, 2 tablet at 08/03/23 0841    sodium chloride 0.9 % infusion, 75 mL/hr, Intravenous, Continuous, Kadeem Arnold MD, Last Rate: 75 mL/hr at 08/03/23 0853, 75 mL/hr at 08/03/23 0853    tamsulosin (FLOMAX) 24 hr capsule 0.4 mg, 0.4 mg, Oral, Daily, Fred Jeffrey MD, 0.4 mg at 08/03/23 0843    valACYclovir (VALTREX) tablet 1,000 mg, 1,000 mg, Oral, Q24H, Tomer Jeong MD, 1,000 mg at 08/03/23 0842     ASSESSMENT  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  Hematology consult appreciated  Adjust home medications  Stress ulcer DVT prophylaxis  Supportive care  PT OT  DNR  Discussed with family nursing staff and hematology  Follow closely and further recommendation current hospital course    KADEEM ARNOLD MD    Copied text in this note has been reviewed and is accurate as of 08/03/23

## 2023-08-03 NOTE — TREATMENT PLAN
Met patient and daughter at bedside.  Patient reports that she has been hospitalized in the past for thrombocytopenia and received steroids and IVIG but has relapsed.  Patient does not read as she has limited visual acuity due to macular degeneration.  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  [facility]ided and [facility]  Reviewed treatment plan of weekly Rituximab for 4 weeks.  Hep B profile negative.  Discussed potential side effects related to Rituximab therapy including infusion and allergic reactions, skin and mouth reactions, infection and low white blood cell count, fatigue, nausea and/or vomiting, and rarely progressive multifocal leukoencephalopathy, GI obstruction, flu-like symptoms, lysis syndrome, heart or kidney trouble.  Patient was very engaged and asked great questions regarding the proposed therapy and side effect management.  The patient was read the consent form and gave verbal consent to proceed.  Her daughter has power of  and has signed the consent form.  They are aware that the plan is to start the Rituximab therapy tomorrow.  The patient has limited venous access and currently has a site at her right antecubital.  We discussed to call the CBC office for bleeding and/or temperature of 100.4 or greater with or without shaking chills.  The patient has a chronic indwelling catheter that is changed every 2 weeks by home health.  We reviewed infection prevention strategies, the importance of daily bathing and catheter care after each bowel movement, as well as oral care and hand washing. The patient lives at home approximately 45  minutes from the infusion center at [facility].  Family will be able to transport for future treatments.

## 2023-08-04 LAB
ALBUMIN SERPL-MCNC: 3 G/DL (ref 3.5–5.2)
ALBUMIN/GLOB SERPL: 1.4 G/DL
ALP SERPL-CCNC: 35 U/L (ref 39–117)
ALT SERPL W P-5'-P-CCNC: 11 U/L (ref 1–33)
ANION GAP SERPL CALCULATED.3IONS-SCNC: 9 MMOL/L (ref 5–15)
AST SERPL-CCNC: 14 U/L (ref 1–32)
BILIRUB SERPL-MCNC: 0.3 MG/DL (ref 0–1.2)
BUN SERPL-MCNC: 37 MG/DL (ref 8–23)
BUN/CREAT SERPL: 25.7 (ref 7–25)
CALCIUM SPEC-SCNC: 8.1 MG/DL (ref 8.2–9.6)
CHLORIDE SERPL-SCNC: 99 MMOL/L (ref 98–107)
CO2 SERPL-SCNC: 20 MMOL/L (ref 22–29)
CREAT SERPL-MCNC: 1.44 MG/DL (ref 0.57–1)
DEPRECATED RDW RBC AUTO: 49.4 FL (ref 37–54)
EGFRCR SERPLBLD CKD-EPI 2021: 34.2 ML/MIN/1.73
ERYTHROCYTE [DISTWIDTH] IN BLOOD BY AUTOMATED COUNT: 16.3 % (ref 12.3–15.4)
GLOBULIN UR ELPH-MCNC: 2.2 GM/DL
GLUCOSE BLDC GLUCOMTR-MCNC: 217 MG/DL (ref 70–130)
GLUCOSE BLDC GLUCOMTR-MCNC: 243 MG/DL (ref 70–130)
GLUCOSE BLDC GLUCOMTR-MCNC: 270 MG/DL (ref 70–130)
GLUCOSE BLDC GLUCOMTR-MCNC: 276 MG/DL (ref 70–130)
GLUCOSE SERPL-MCNC: 275 MG/DL (ref 65–99)
HAV IGM SERPL QL IA: NORMAL
HBV CORE AB SERPL QL IA: POSITIVE
HBV CORE IGM SERPL QL IA: NORMAL
HCT VFR BLD AUTO: 33.1 % (ref 34–46.6)
HCV AB SER DONR QL: NORMAL
HGB BLD-MCNC: 10.9 G/DL (ref 12–15.9)
LYMPHOCYTES # BLD MANUAL: 0.89 10*3/MM3 (ref 0.7–3.1)
LYMPHOCYTES NFR BLD MANUAL: 5.3 % (ref 5–12)
MCH RBC QN AUTO: 28 PG (ref 26.6–33)
MCHC RBC AUTO-ENTMCNC: 32.9 G/DL (ref 31.5–35.7)
MCV RBC AUTO: 85.1 FL (ref 79–97)
METHYLMALONATE SERPL-SCNC: 563 NMOL/L (ref 0–378)
MONOCYTES # BLD: 0.44 10*3/MM3 (ref 0.1–0.9)
NEUTROPHILS # BLD AUTO: 7.04 10*3/MM3 (ref 1.7–7)
NEUTROPHILS NFR BLD MANUAL: 84 % (ref 42.7–76)
PLAT MORPH BLD: NORMAL
PLATELET # BLD AUTO: 48 10*3/MM3 (ref 140–450)
PMV BLD AUTO: 11.2 FL (ref 6–12)
POTASSIUM SERPL-SCNC: 4.2 MMOL/L (ref 3.5–5.2)
PROT SERPL-MCNC: 5.2 G/DL (ref 6–8.5)
RBC # BLD AUTO: 3.89 10*6/MM3 (ref 3.77–5.28)
RBC MORPH BLD: NORMAL
SMUDGE CELLS BLD QL SMEAR: ABNORMAL
SODIUM SERPL-SCNC: 128 MMOL/L (ref 136–145)
VARIANT LYMPHS NFR BLD MANUAL: 10.6 % (ref 19.6–45.3)
WBC NRBC COR # BLD: 8.38 10*3/MM3 (ref 3.4–10.8)

## 2023-08-04 PROCEDURE — 80053 COMPREHEN METABOLIC PANEL: CPT | Performed by: HOSPITALIST

## 2023-08-04 PROCEDURE — 86707 HEPATITIS BE ANTIBODY: CPT | Performed by: PHYSICIAN ASSISTANT

## 2023-08-04 PROCEDURE — 63710000001 DIPHENHYDRAMINE PER 50 MG: Performed by: INTERNAL MEDICINE

## 2023-08-04 PROCEDURE — 85025 COMPLETE CBC W/AUTO DIFF WBC: CPT | Performed by: HOSPITALIST

## 2023-08-04 PROCEDURE — 99233 SBSQ HOSP IP/OBS HIGH 50: CPT | Performed by: INTERNAL MEDICINE

## 2023-08-04 PROCEDURE — 86709 HEPATITIS A IGM ANTIBODY: CPT | Performed by: PHYSICIAN ASSISTANT

## 2023-08-04 PROCEDURE — 86803 HEPATITIS C AB TEST: CPT | Performed by: PHYSICIAN ASSISTANT

## 2023-08-04 PROCEDURE — 82948 REAGENT STRIP/BLOOD GLUCOSE: CPT

## 2023-08-04 PROCEDURE — 99221 1ST HOSP IP/OBS SF/LOW 40: CPT | Performed by: PHYSICIAN ASSISTANT

## 2023-08-04 PROCEDURE — 97530 THERAPEUTIC ACTIVITIES: CPT

## 2023-08-04 PROCEDURE — 85007 BL SMEAR W/DIFF WBC COUNT: CPT | Performed by: HOSPITALIST

## 2023-08-04 PROCEDURE — 87350 HEPATITIS BE AG IA: CPT | Performed by: PHYSICIAN ASSISTANT

## 2023-08-04 PROCEDURE — 97535 SELF CARE MNGMENT TRAINING: CPT

## 2023-08-04 PROCEDURE — 86705 HEP B CORE ANTIBODY IGM: CPT | Performed by: PHYSICIAN ASSISTANT

## 2023-08-04 PROCEDURE — 63710000001 INSULIN LISPRO (HUMAN) PER 5 UNITS: Performed by: HOSPITALIST

## 2023-08-04 PROCEDURE — 86708 HEPATITIS A ANTIBODY: CPT | Performed by: PHYSICIAN ASSISTANT

## 2023-08-04 PROCEDURE — 25010000002 CYANOCOBALAMIN PER 1000 MCG: Performed by: INTERNAL MEDICINE

## 2023-08-04 PROCEDURE — 25010000002 IMMUNE GLOBULIN (HUMAN) 20 GM/200ML SOLUTION: Performed by: INTERNAL MEDICINE

## 2023-08-04 PROCEDURE — 94799 UNLISTED PULMONARY SVC/PX: CPT

## 2023-08-04 PROCEDURE — 25010000002 METHYLPREDNISOLONE PER 125 MG: Performed by: INTERNAL MEDICINE

## 2023-08-04 PROCEDURE — 87517 HEPATITIS B DNA QUANT: CPT | Performed by: PHYSICIAN ASSISTANT

## 2023-08-04 PROCEDURE — 63710000001 INSULIN GLARGINE PER 5 UNITS: Performed by: HOSPITALIST

## 2023-08-04 PROCEDURE — 25010000002 IMMUNE GLOBULIN (HUMAN) 5 GM/50ML SOLUTION: Performed by: INTERNAL MEDICINE

## 2023-08-04 PROCEDURE — 94664 DEMO&/EVAL PT USE INHALER: CPT

## 2023-08-04 RX ORDER — ACETAMINOPHEN 325 MG/1
650 TABLET ORAL ONCE
Status: DISCONTINUED | OUTPATIENT
Start: 2023-08-04 | End: 2023-08-04

## 2023-08-04 RX ORDER — FAMOTIDINE 10 MG/ML
20 INJECTION, SOLUTION INTRAVENOUS ONCE
Status: DISCONTINUED | OUTPATIENT
Start: 2023-08-04 | End: 2023-08-04

## 2023-08-04 RX ORDER — HYDRALAZINE HYDROCHLORIDE 50 MG/1
100 TABLET, FILM COATED ORAL 3 TIMES DAILY
Status: DISCONTINUED | OUTPATIENT
Start: 2023-08-04 | End: 2023-08-10 | Stop reason: HOSPADM

## 2023-08-04 RX ORDER — ACETAMINOPHEN 325 MG/1
650 TABLET ORAL EVERY 24 HOURS
Status: COMPLETED | OUTPATIENT
Start: 2023-08-04 | End: 2023-08-05

## 2023-08-04 RX ORDER — SODIUM CHLORIDE 9 MG/ML
250 INJECTION, SOLUTION INTRAVENOUS ONCE
Status: DISCONTINUED | OUTPATIENT
Start: 2023-08-04 | End: 2023-08-04

## 2023-08-04 RX ORDER — FAMOTIDINE 10 MG/ML
20 INJECTION, SOLUTION INTRAVENOUS AS NEEDED
Status: DISCONTINUED | OUTPATIENT
Start: 2023-08-04 | End: 2023-08-04

## 2023-08-04 RX ORDER — CARVEDILOL 3.12 MG/1
3.12 TABLET ORAL 2 TIMES DAILY WITH MEALS
Status: DISCONTINUED | OUTPATIENT
Start: 2023-08-04 | End: 2023-08-10 | Stop reason: HOSPADM

## 2023-08-04 RX ORDER — DIPHENHYDRAMINE HYDROCHLORIDE 50 MG/ML
50 INJECTION INTRAMUSCULAR; INTRAVENOUS ONCE AS NEEDED
Status: ACTIVE | OUTPATIENT
Start: 2023-08-04 | End: 2023-08-06

## 2023-08-04 RX ORDER — DIPHENHYDRAMINE HCL 25 MG
25 CAPSULE ORAL EVERY 24 HOURS
Status: COMPLETED | OUTPATIENT
Start: 2023-08-04 | End: 2023-08-05

## 2023-08-04 RX ORDER — MEPERIDINE HYDROCHLORIDE 25 MG/ML
12.5 INJECTION INTRAMUSCULAR; INTRAVENOUS; SUBCUTANEOUS
Status: DISCONTINUED | OUTPATIENT
Start: 2023-08-04 | End: 2023-08-04

## 2023-08-04 RX ORDER — INSULIN LISPRO 100 [IU]/ML
12 INJECTION, SOLUTION INTRAVENOUS; SUBCUTANEOUS
Status: DISCONTINUED | OUTPATIENT
Start: 2023-08-04 | End: 2023-08-10 | Stop reason: HOSPADM

## 2023-08-04 RX ORDER — DIPHENHYDRAMINE HYDROCHLORIDE 50 MG/ML
50 INJECTION INTRAMUSCULAR; INTRAVENOUS AS NEEDED
Status: DISCONTINUED | OUTPATIENT
Start: 2023-08-04 | End: 2023-08-04

## 2023-08-04 RX ORDER — LIDOCAINE 50 MG/G
2 PATCH TOPICAL
Status: DISCONTINUED | OUTPATIENT
Start: 2023-08-04 | End: 2023-08-10 | Stop reason: HOSPADM

## 2023-08-04 RX ORDER — FAMOTIDINE 10 MG/ML
20 INJECTION, SOLUTION INTRAVENOUS ONCE AS NEEDED
Status: DISCONTINUED | OUTPATIENT
Start: 2023-08-04 | End: 2023-08-06

## 2023-08-04 RX ADMIN — METHYLPREDNISOLONE SODIUM SUCCINATE 60 MG: 125 INJECTION, POWDER, FOR SOLUTION INTRAMUSCULAR; INTRAVENOUS at 08:01

## 2023-08-04 RX ADMIN — Medication 1000 UNITS: at 08:01

## 2023-08-04 RX ADMIN — HYDROCODONE BITARTRATE AND ACETAMINOPHEN 1 TABLET: 7.5; 325 TABLET ORAL at 03:28

## 2023-08-04 RX ADMIN — INSULIN LISPRO 3 UNITS: 100 INJECTION, SOLUTION INTRAVENOUS; SUBCUTANEOUS at 12:08

## 2023-08-04 RX ADMIN — MONTELUKAST SODIUM 10 MG: 10 TABLET, FILM COATED ORAL at 21:07

## 2023-08-04 RX ADMIN — HYDROCODONE BITARTRATE AND ACETAMINOPHEN 1 TABLET: 7.5; 325 TABLET ORAL at 12:08

## 2023-08-04 RX ADMIN — BUDESONIDE AND FORMOTEROL FUMARATE DIHYDRATE 2 PUFF: 160; 4.5 AEROSOL RESPIRATORY (INHALATION) at 07:01

## 2023-08-04 RX ADMIN — LEVOTHYROXINE SODIUM 25 MCG: 0.03 TABLET ORAL at 08:00

## 2023-08-04 RX ADMIN — SENNOSIDES AND DOCUSATE SODIUM 2 TABLET: 50; 8.6 TABLET ORAL at 21:07

## 2023-08-04 RX ADMIN — IMMUNE GLOBULIN (HUMAN) 20 G: 10 INJECTION INTRAVENOUS; SUBCUTANEOUS at 18:10

## 2023-08-04 RX ADMIN — INSULIN LISPRO 4 UNITS: 100 INJECTION, SOLUTION INTRAVENOUS; SUBCUTANEOUS at 07:57

## 2023-08-04 RX ADMIN — INSULIN LISPRO 10 UNITS: 100 INJECTION, SOLUTION INTRAVENOUS; SUBCUTANEOUS at 07:57

## 2023-08-04 RX ADMIN — PANTOPRAZOLE SODIUM 40 MG: 40 TABLET, DELAYED RELEASE ORAL at 05:46

## 2023-08-04 RX ADMIN — INSULIN LISPRO 3 UNITS: 100 INJECTION, SOLUTION INTRAVENOUS; SUBCUTANEOUS at 17:14

## 2023-08-04 RX ADMIN — HYDROCODONE BITARTRATE AND ACETAMINOPHEN 1 TABLET: 7.5; 325 TABLET ORAL at 16:38

## 2023-08-04 RX ADMIN — HYDRALAZINE HYDROCHLORIDE 75 MG: 50 TABLET, FILM COATED ORAL at 12:11

## 2023-08-04 RX ADMIN — GABAPENTIN 600 MG: 300 CAPSULE ORAL at 08:01

## 2023-08-04 RX ADMIN — INSULIN GLARGINE 36 UNITS: 100 INJECTION, SOLUTION SUBCUTANEOUS at 21:07

## 2023-08-04 RX ADMIN — INSULIN LISPRO 12 UNITS: 100 INJECTION, SOLUTION INTRAVENOUS; SUBCUTANEOUS at 17:18

## 2023-08-04 RX ADMIN — IMMUNE GLOBULIN (HUMAN) 20 G: 10 INJECTION INTRAVENOUS; SUBCUTANEOUS at 22:41

## 2023-08-04 RX ADMIN — DIPHENHYDRAMINE HYDROCHLORIDE 25 MG: 25 CAPSULE ORAL at 15:56

## 2023-08-04 RX ADMIN — VALACYCLOVIR HYDROCHLORIDE 1000 MG: 500 TABLET, FILM COATED ORAL at 08:01

## 2023-08-04 RX ADMIN — HYDRALAZINE HYDROCHLORIDE 100 MG: 50 TABLET, FILM COATED ORAL at 16:37

## 2023-08-04 RX ADMIN — HYDROCODONE BITARTRATE AND ACETAMINOPHEN 1 TABLET: 7.5; 325 TABLET ORAL at 21:07

## 2023-08-04 RX ADMIN — IMMUNE GLOBULIN (HUMAN) 5 G: 10 INJECTION INTRAVENOUS; SUBCUTANEOUS at 17:07

## 2023-08-04 RX ADMIN — TAMSULOSIN HYDROCHLORIDE 0.4 MG: 0.4 CAPSULE ORAL at 08:00

## 2023-08-04 RX ADMIN — HYDROCODONE BITARTRATE AND ACETAMINOPHEN 1 TABLET: 7.5; 325 TABLET ORAL at 07:56

## 2023-08-04 RX ADMIN — BUDESONIDE AND FORMOTEROL FUMARATE DIHYDRATE 2 PUFF: 160; 4.5 AEROSOL RESPIRATORY (INHALATION) at 21:15

## 2023-08-04 RX ADMIN — GABAPENTIN 600 MG: 300 CAPSULE ORAL at 21:07

## 2023-08-04 RX ADMIN — HYDRALAZINE HYDROCHLORIDE 100 MG: 50 TABLET, FILM COATED ORAL at 21:08

## 2023-08-04 RX ADMIN — INSULIN LISPRO 10 UNITS: 100 INJECTION, SOLUTION INTRAVENOUS; SUBCUTANEOUS at 12:08

## 2023-08-04 RX ADMIN — ACETAMINOPHEN 650 MG: 325 TABLET, FILM COATED ORAL at 15:55

## 2023-08-04 RX ADMIN — CARVEDILOL 6.25 MG: 6.25 TABLET, FILM COATED ORAL at 12:11

## 2023-08-04 RX ADMIN — LORAZEPAM 1 MG: 1 TABLET ORAL at 21:07

## 2023-08-04 RX ADMIN — GUAIFENESIN 600 MG: 600 TABLET, EXTENDED RELEASE ORAL at 21:07

## 2023-08-04 RX ADMIN — GUAIFENESIN 600 MG: 600 TABLET, EXTENDED RELEASE ORAL at 08:00

## 2023-08-04 RX ADMIN — CARVEDILOL 3.12 MG: 3.12 TABLET, FILM COATED ORAL at 17:13

## 2023-08-04 RX ADMIN — CYANOCOBALAMIN 1000 MCG: 1000 INJECTION, SOLUTION INTRAMUSCULAR; SUBCUTANEOUS at 08:01

## 2023-08-04 RX ADMIN — LIDOCAINE 2 PATCH: 700 PATCH TOPICAL at 12:04

## 2023-08-04 RX ADMIN — INSULIN LISPRO 4 UNITS: 100 INJECTION, SOLUTION INTRAVENOUS; SUBCUTANEOUS at 21:07

## 2023-08-04 NOTE — PLAN OF CARE
Goal Outcome Evaluation:  Plan of Care Reviewed With: patient        Progress: improving  Outcome Evaluation: Patient alert and oriented, on room air, tolerating regular diet, ambulating with assist x1-2 encouraged to be out of bed and in chair for meals, shingles site on right side being monitored closely currently scabbed lidocaine patches applied for discomfort, norco given for back pain, incontinent care provided as needed, IV steroids continued per MAR/order, blood sugars monitored and treated, IVIG to be given per oncology, vitals stable, plan of care continued

## 2023-08-04 NOTE — PLAN OF CARE
Goal Outcome Evaluation:  Plan of Care Reviewed With: (P) patient, daughter        Progress: (P) improving  Outcome Evaluation: (P) Pt was seen for PT treatment today. Pt UIC upon arrival with daughter present in room. CGA/Jm for STS transfers witth VC for hand placement. CGAx1 with asssit x1 for managing pt lines while ambulating 15'. Pt demonstrates significant forward flexed posture, decreased gait speed, decreased step length, and decreased overall endurance. Pt will benefit from continued skill PT interventions to improve functional mobility, strength, and endurance. Rec home with 24 hour care/assist.      Anticipated Discharge Disposition (PT): home with 24/7 care, home with home health

## 2023-08-04 NOTE — PLAN OF CARE
Goal Outcome Evaluation:            Consult received. Discussed with RN. Noted GI recommended/ordered esophagram as OP 7/26/23, esophagram not yet performed. Recommend proceed with esophagram given complaints of esophageal dysphagia. RN in agreement. Discussed with patient and patient's daughter. Daughter confirmed plan for esophagram per OP GI appt. Requests esophagram be completed during inpatient admission. SLP to follow for need for eval s/p completion of esophagram. Patient and daughter in agreement.

## 2023-08-04 NOTE — THERAPY TREATMENT NOTE
Patient Name: Helena Carmen  : 1931    MRN: [id number]                              Today's Date: 2023       Admit Date: 2023    Visit Dx:     ICD-10-CM ICD-9-CM   1. Uncontrolled hypertension  I10 401.9   2. Thrombocytopenia  D69.6 287.5   3. Hyponatremia  E87.1 276.1   4. History of ITP  Z86.2 V12.3   5. Rash (left flank)  R21 782.1     Patient Active Problem List   Diagnosis    Aortic valve stenosis    Fatigue    MI (mitral incompetence)    PVC (premature ventricular contraction)    Legally blind    Essential hypertension    Hypertriglyceridemia    Pulmonary hypertension    Paroxysmal atrial fibrillation    Anxiety    Stage 4 chronic kidney disease    Chronic pain disorder    Degeneration of lumbar intervertebral disc    Type 2 diabetes mellitus with hyperglycemia    Esophageal reflux    Gastroparesis    Hiatal hernia    Iatrogenic hypothyroidism    Macular degeneration    Malignant neoplasm of uterus    Osteoarthritis of knee    Peripheral nerve disease    Secondary hyperparathyroidism    Thrombocytopenia    Chronic heart failure with preserved ejection fraction    Other cirrhosis of liver    Hypothyroidism    Nonrheumatic tricuspid valve regurgitation    Anemia, chronic disease    Hyponatremia    Closed fracture of fifth lumbar vertebra    Closed fracture of fifth lumbar vertebra, unspecified fracture morphology, initial encounter    Diabetic polyneuropathy associated with type 2 diabetes mellitus    Chronic ITP (idiopathic thrombocytopenia)    Chronic midline low back pain with bilateral sciatica    Acute deep vein thrombosis of calf, bilateral    Compression fracture of L5 vertebra with routine healing    Acute left-sided low back pain with left-sided sciatica    Urine retention    Rib fracture    Acute on chronic combined systolic and diastolic congestive heart failure    Acute UTI    Blindness right eye category 3, blindness left eye category 3    Cognitive communication deficit     Pseudomonas (aeruginosa) (mallei) (pseudomallei) as the cause of diseases classified elsewhere    Multifocal pneumonia     Past Medical History:   Diagnosis Date    Aortic valve stenosis     s/p tissue AVR    Back pain     CKD (chronic kidney disease)     Colitis due to Clostridioides difficile 01/26/2022    Diastolic dysfunction     Grade 2 per echocardiogram 2013    Diverticulosis     Exertional shortness of breath     Heart disease     Hiatal hernia     Hyperlipidemia     Hypertension     Hyperthyroidism     Hypertriglyceridemia 05/31/2018    Hypothyroidism     L5 compression fracture (HCC) 08/2022    Left ventricular hypertrophy     Legally blind     Liver disease     Low back pain     Macular degeneration     Mitral regurgitation     Osteoarthritis of hip     Osteoporosis     Pancreatitis 01/26/2022    Paroxysmal atrial fibrillation     Peripheral neuropathy     Premature ventricular contractions     Pulmonary hypertension     Renal insufficiency syndrome     Type 2 diabetes mellitus     Uterine cancer      Past Surgical History:   Procedure Laterality Date    AORTIC VALVE REPAIR/REPLACEMENT      APPENDECTOMY      CATARACT EXTRACTION      1970, 1999    CHOLECYSTECTOMY      COLONOSCOPY      ENDOSCOPY  08/15/2014    no gross lesions in stomach/duodenum, erythrematous mucosa in stomach    EPIDURAL BLOCK      HYSTERECTOMY  2007    STERNOTOMY      UPPER GASTROINTESTINAL ENDOSCOPY        General Information       Row Name 08/04/23 1431          Physical Therapy Time and Intention    Document Type therapy note (daily note) (P)   -SK     Mode of Treatment individual therapy;physical therapy (P)   -SK       Row Name 08/04/23 1431          General Information    Patient Profile Reviewed yes (P)   -SK     Existing Precautions/Restrictions fall (P)   -SK     Barriers to Rehab visual deficit;impaired sensation (P)   -SK       Row Name 08/04/23 1431          Living Environment    People in Home child(edison), adult;other  relative(s) (P)   -SK       Row Name 08/04/23 1431          Cognition    Orientation Status (Cognition) oriented to;person;place (P)   -SK       Row Name 08/04/23 1431          Safety Issues, Functional Mobility    Impairments Affecting Function (Mobility) balance;endurance/activity tolerance;postural/trunk control;strength;visual/perceptual (P)   -SK     Comment, Safety Issues/Impairments (Mobility) nonskid socks and gait belt donned. (P)   -SK               User Key  (r) = Recorded By, (t) = Taken By, (c) = Cosigned By      Initials Name Provider Type    Jocy Olivera, PT Student PT Student                   Mobility       Row Name 08/04/23 1432          Bed Mobility    Comment, (Bed Mobility) UIC upon arrival. (P)   -SK       Row Name 08/04/23 1432          Sit-Stand Transfer    Sit-Stand [place] (Transfers) minimum assist (75% patient effort);contact guard;verbal cues (P)   -SK     Assistive Device (Sit-Stand Transfers) walker, front-wheeled (P)   -SK     Comment, (Sit-Stand Transfer) Min Ax1 for STS. VC for hand placement on walker. Pt demonstrates a signifiant forward flexed posture; pt/family reports this is baseline. (P)   -SK       Row Name 08/04/23 1432          Gait/Stairs (Locomotion)    [place]lap Level (Gait) contact guard (P)   -SK     Distance in Feet (Gait) 15' (P)   -SK     Deviations/Abnormal Patterns (Gait) gait speed decreased;base of support, narrow;stride length decreased;weight shifting decreased (P)   -SK     Bilateral Gait Deviations forward flexed posture (P)   -SK     Comment, (Gait/Stairs) Pt CGAx1 to ambulate 15' with assist x1 for managing pt lines. (P)   -SK               User Key  (r) = Recorded By, (t) = Taken By, (c) = Cosigned By      Initials Name Provider Type    Jocy Olivera PT Student PT Student                   Obj/Interventions       Row Name 08/04/23 1437          Balance    Sit to Stand Dynamic Balance verbal cues;contact guard (P)   -SK     Static  Standing Balance verbal cues;contact guard (P)   -SK     Position/Device Used, Standing Balance supported;walker, front-wheeled (P)   -SK     Comment, Balance No overt LOB but pt demonstrates mild unsteadiness. (P)   -SK       Row Name 08/04/23 1437          Sensory Assessment (Somatosensory)    Right LE Sensory Assessment impaired (P)   neuropathy  -SK               User Key  (r) = Recorded By, (t) = Taken By, (c) = Cosigned By      Initials Name Provider Type    Jocy Olivera, PT Student PT Student                   Goals/Plan    No documentation.                  Clinical Impression       Row Name 08/04/23 1440          Pain    Pain Location - foot;back (P)   R foot and back.  -SK     Pre/Posttreatment Pain Comment Pt did not rate pain today. (P)   -SK     Pain Intervention(s) Repositioned;Ambulation/increased activity (P)   -SK       Row Name 08/04/23 1440          Plan of Care Review    Plan of Care Reviewed With patient;daughter (P)   -SK     Progress improving (P)   -SK     Outcome Evaluation Pt was seen for PT treatment today. Pt UIC upon arrival with daughter present in room. CGA/Jm for STS transfers witth VC for hand placement. CGAx1 with asssit x1 for managing pt lines while ambulating 15'. Pt demonstrates significant forward flexed posture, decreased gait speed, decreased step length, and decreased overall endurance. Pt will benefit from continued skill PT interventions to improve functional mobility, strength, and endurance. Rec home with 24 hour care/assist. (P)   -SK       Row Name 08/04/23 1440          Therapy Assessment/Plan (PT)    Rehab Potential (PT) good, to achieve stated therapy goals (P)   -SK     Criteria for Skilled Interventions Met (PT) yes (P)   -SK     Therapy Frequency (PT) 5 times/wk (P)   -SK     Predicted Duration of Therapy Intervention (PT) 1 week (P)   -SK       Row Name 08/04/23 1440          Positioning and Restraints    Pre-Treatment Position sitting in chair/recliner  (P)   -SK     Post Treatment Position chair (P)   -SK     In Chair notified nsg;reclined;call light within reach;encouraged to call for assist;exit alarm on;with family/caregiver (P)   -SK               User Key  (r) = Recorded By, (t) = Taken By, (c) = Cosigned By      Initials Name Provider Type    Jocy Olivera, PT Student PT Student                   Outcome Measures       Row Name 08/04/23 1451 08/04/23 0825       How much help from another person do you currently need...    Turning from your back to your side while in flat bed without using bedrails? 3 (P)   -SK 3  -EH    Moving from lying on back to sitting on the side of a flat bed without bedrails? 3 (P)   -SK 3  -EH    Moving to and from a bed to a chair (including a wheelchair)? 3 (P)   -SK 3  -EH    Standing up from a chair using your arms (e.g., wheelchair, bedside chair)? 3 (P)   -SK 3  -EH    Climbing 3-5 steps with a railing? 2 (P)   -SK 2  -    To walk in hospital room? 3 (P)   -SK 2  -EH    AM-PAC 6 Clicks Score (PT) 17 (P)   -SK 16  -    Highest level of mobility 5 --> Static standing (P)   -SK 5 --> Static standing  -      Row Name 08/04/23 1451 08/04/23 0955       Functional Assessment    Outcome Measure Options AM-PAC 6 Clicks Basic Mobility (PT) (P)   -SK AM-PAC 6 Clicks Daily Activity (OT)  -              User Key  (r) = Recorded By, (t) = Taken By, (c) = Cosigned By      Initials Name Provider Type    Jennifer Dias OTR Occupational Therapist    Maggie Casas, RN Registered Nurse    Jocy Olivera, PT Student PT Student                                 Physical Therapy Education       Title: PT OT SLP Therapies (In Progress)       Topic: Physical Therapy (Done)       Point: Mobility training (Done)       Learning Progress Summary             Patient Acceptance, E,TB, VU,NR by SK at 8/4/2023 1452    Acceptance, E,TB, VU,NR by SK at 8/2/2023 1534                         Point: Body mechanics (Done)       Learning Progress  Summary             Patient Acceptance, E,TB, VU,NR by SK at 8/4/2023 1452    Acceptance, E,TB, VU,NR by SK at 8/2/2023 1534                         Point: Precautions (Done)       Learning Progress Summary             Patient Acceptance, E,TB, VU,NR by SK at 8/4/2023 1452    Acceptance, E,TB, VU,NR by SK at 8/2/2023 1534                                         User Key       Initials Effective Dates Name Provider Type Discipline    SK 07/28/23 -  Jocy Modi, PT Student PT Student PT                  PT Recommendation and Plan  Planned Therapy Interventions (PT): bed mobility training, gait training, balance training, patient/family education, postural re-education, strengthening, transfer training  Plan of Care Reviewed With: (P) patient, daughter  Progress: (P) improving  Outcome Evaluation: (P) Pt was seen for PT treatment today. Pt UIC upon arrival with daughter present in room. CGA/Jm for STS transfers witth VC for hand placement. CGAx1 with asssit x1 for managing pt lines while ambulating 15'. Pt demonstrates significant forward flexed posture, decreased gait speed, decreased step length, and decreased overall endurance. Pt will benefit from continued skill PT interventions to improve functional mobility, strength, and endurance. Rec home with 24 hour care/assist.     Time Calculation:         PT Charges       Row Name 08/04/23 1453             Time Calculation    Start Time 1400 (P)   -SK      Stop Time 1423 (P)   -SK      Time Calculation (min) 23 min (P)   -SK      PT Received On 08/04/23 (P)   -SK      PT - Next Appointment 08/07/23 (P)   -SK         Time Calculation- PT    Total Timed Code Minutes- PT 23 minute(s) (P)   -SK         Timed Charges    97965 - Gait Training Minutes  -- (P)   -SK      59364 - PT Therapeutic Activity Minutes 23 (P)   -SK         Total Minutes    Timed Charges Total Minutes 23 (P)   -SK       Total Minutes 23 (P)   -SK                User Key  (r) = Recorded By, (t) =  Taken By, (c) = Cosigned By      Initials Name Provider Type    SK Jocy Modi, PT Student PT Student                  Therapy Charges for Today       Code Description Service Date Service Provider Modifiers Qty    [id number]  PT THERAPEUTIC ACT EA 15 MIN 8/4/2023 Jocy Modi, PT Student GP 2            PT G-Codes  Outcome Measure Options: (P) AM-PAC 6 Clicks Basic Mobility (PT)  AM-PAC 6 Clicks Score (PT): (P) 17  AM-PAC 6 Clicks Score (OT): 14  PT Discharge Summary  Anticipated Discharge Disposition (PT): (P) home, home with assist, home with 24/7 care    Jocy Modi PT Student  8/4/2023

## 2023-08-04 NOTE — CASE MANAGEMENT/SOCIAL WORK
Continued Stay Note  [facility]     Patient Name: Helena Carmen  MRN: [id number]  Today's Date: 8/4/2023    Admit Date: 7/31/2023    Plan: Home with HH and family   Discharge Plan       Row Name 08/04/23 0910       Plan    Plan Home with HH and family    Patient/Family in Agreement with Plan yes    Plan Comments Spoke with patient and daughter at bedside. Patient plan is to return home with 24/7 family assistance with [facility]. Family will provide transportation. Daughter stated that her and brother both own trucks, explained [facility] could arrange transport for discharge if needed. Family  to update if need transportation.                   Discharge Codes    No documentation.                 Expected Discharge Date and Time       Expected Discharge Date Expected Discharge Time    Aug 5, 2023               Beth Gao RN

## 2023-08-04 NOTE — PROGRESS NOTES
"Daily progress note    Primary care physician      Chief complaint  Doing same with no new complaints except right arm swelling and family at bedside.  Patient denies any shortness of breath    History of present illness  89-year-old white female with very complex past medical history and well-known to our service including ITP chronic low back pain with L5 vertebral body fracture degenerative disease paroxysmal atrial fibrillation DVT diabetes hypertension hyperlipidemia hypothyroidism congestive heart failure and chronic kidney disease stage III who lives at home with her daughter brought to the emergency room with generalized weakness and bruises all over.  Patient work-up in ER revealed worsening thrombocytopenia admitted for management.  Patient denies any fever chills chest pain shortness of breath abdominal pain nausea vomiting diarrhea.  Patient is DNR per her wishes.     REVIEW OF SYSTEMS  Unremarkable except severe weakness and right arm swelling     PHYSICAL EXAM  Blood pressure (!) 185/65, pulse 57, temperature 97 øF (36.1 øC), temperature source Oral, resp. rate 16, height 144.8 cm (57.01\"), weight 77.9 kg (171 lb 11.8 oz), SpO2 98 %.    Constitutional:       General: She is not in acute distress.     Appearance: She is well-developed.   HENT:      Head: Normocephalic and atraumatic.   Eyes:      Extraocular Movements: Extraocular movements intact.   Cardiovascular:      Rate and Rhythm: Normal rate and regular rhythm.      Heart sounds: Normal heart sounds.   Pulmonary:      Effort: Pulmonary effort is normal.      Breath sounds: Normal breath sounds.   Abdominal:      General: There is no distension.      Comments: Erythematous macular clusters noted along the left flank   Genitourinary:     Comments: Indwelling Hoffman catheter  Skin:     General: Skin is warm.   Neurological:      General: No focal deficit present.      Mental Status: She is alert and oriented to person, place, and time. "   Psychiatric:         Mood and Affect: Mood normal.      LAB RESULTS  Lab Results (last 24 hours)       Procedure Component Value Units Date/Time    POC Glucose Once [[id number]]  (Abnormal) Collected: 08/04/23 1150    Specimen: Blood Updated: 08/04/23 1151     Glucose 243 mg/dL      Comment: Meter: [id number] : [id number] Sarwat JOHNSON       Manual Differential [[id number]]  (Abnormal) Collected: 08/04/23 0743    Specimen: Blood Updated: 08/04/23 0847     Neutrophil % 84.0 %      Lymphocyte % 10.6 %      Monocyte % 5.3 %      Neutrophils Absolute 7.04 10*3/mm3      Lymphocytes Absolute 0.89 10*3/mm3      Monocytes Absolute 0.44 10*3/mm3      RBC Morphology Normal     Smudge Cells Slight/1+     Platelet Morphology Normal    Comprehensive Metabolic Panel [[id number]]  (Abnormal) Collected: 08/04/23 0743    Specimen: Blood Updated: 08/04/23 0830     Glucose 275 mg/dL      BUN 37 mg/dL      Creatinine 1.44 mg/dL      Sodium 128 mmol/L      Potassium 4.2 mmol/L      Chloride 99 mmol/L      CO2 20.0 mmol/L      Calcium 8.1 mg/dL      Total Protein 5.2 g/dL      Albumin 3.0 g/dL      ALT (SGPT) 11 U/L      AST (SGOT) 14 U/L      Alkaline Phosphatase 35 U/L      Total Bilirubin 0.3 mg/dL      Globulin 2.2 gm/dL      A/G Ratio 1.4 g/dL      BUN/Creatinine Ratio 25.7     Anion Gap 9.0 mmol/L      eGFR 34.2 mL/min/1.73     Narrative:      GFR Normal >60  Chronic Kidney Disease <60  Kidney Failure <15    The GFR formula is only valid for adults with stable renal function between ages 18 and 70.    CBC & Differential [[id number]]  (Abnormal) Collected: 08/04/23 0743    Specimen: Blood Updated: 08/04/23 0829    Narrative:      The following orders were created for panel order CBC & Differential.  Procedure                               Abnormality         Status                     ---------                               -----------         ------                     CBC Auto Differential[[id number]]        Abnormal             Final result                 Please view results for these tests on the individual orders.    CBC Auto Differential [[id number]]  (Abnormal) Collected: 08/04/23 0743    Specimen: Blood Updated: 08/04/23 0829     WBC 8.38 10*3/mm3      RBC 3.89 10*6/mm3      Hemoglobin 10.9 g/dL      Hematocrit 33.1 %      MCV 85.1 fL      MCH 28.0 pg      MCHC 32.9 g/dL      RDW 16.3 %      RDW-SD 49.4 fl      MPV 11.2 fL      Platelets 48 10*3/mm3     Hepatitis B Core Antibody, Total [[id number]]  (Abnormal) Collected: 08/03/23 0932    Specimen: Blood Updated: 08/04/23 0818     Hep B Core Total Ab Positive    Narrative:      Performed at:  [address] [place] [address]  [id number]  : Toño Flores PhD, Phone:  [phone]    POC Glucose Once [[id number]]  (Abnormal) Collected: 08/04/23 0750    Specimen: Blood Updated: 08/04/23 0752     Glucose 270 mg/dL      Comment: Meter: [id number] : [id number] Sarwat Lindsay NA       POC Glucose Once [[id number]]  (Abnormal) Collected: 08/03/23 2049    Specimen: Blood Updated: 08/03/23 2051     Glucose 347 mg/dL      Comment: Meter: [id number] : [id number] Reza Lawrence NA       POC Glucose Once [[id number]]  (Abnormal) Collected: 08/03/23 1559    Specimen: Blood Updated: 08/03/23 1601     Glucose 323 mg/dL      Comment: Meter: [id number] : [id number] Sera JOHNSON             Imaging Results (Last 24 Hours)       ** No results found for the last 24 hours. **            Current Facility-Administered Medications:     acetaminophen (TYLENOL) tablet 650 mg, 650 mg, Oral, Q24H, Tomer Jeong MD    budesonide-formoterol (SYMBICORT) 160-4.5 MCG/ACT inhaler 2 puff, 2 puff, Inhalation, BID, Fred Jeffrey MD, 2 puff at 08/04/23 0701    carvedilol (COREG) tablet 6.25 mg, 6.25 mg, Oral, BID With Meals, Mango Arnold MD, 6.25 mg at 08/04/23 1211    cholecalciferol (VITAMIN D3) tablet 1,000 Units, 1,000 Units, Oral, Daily, Fred Jeffrey MD,  1,000 Units at 08/04/23 0801    cyanocobalamin injection 1,000 mcg, 1,000 mcg, Intramuscular, Daily, Tomer Jeong MD, 1,000 mcg at 08/04/23 0801    diphenhydrAMINE (BENADRYL) capsule 25 mg, 25 mg, Oral, Q24H, Tomer Jeong MD    diphenhydrAMINE (BENADRYL) injection 50 mg, 50 mg, Intravenous, Once PRN, Tomer Jeong MD    famotidine (PEPCID) injection 20 mg, 20 mg, Intravenous, Once PRN, Tomer Jeong MD    gabapentin (NEURONTIN) capsule 600 mg, 600 mg, Oral, Q12H, Fred Jeffrey MD, 600 mg at 08/04/23 0801    guaiFENesin (MUCINEX) 12 hr tablet 600 mg, 600 mg, Oral, Q12H, Mango Arnold MD, 600 mg at 08/04/23 0800    hydrALAZINE (APRESOLINE) tablet 75 mg, 75 mg, Oral, TID, Mango Arnold MD, 75 mg at 08/04/23 1211    HYDROcodone-acetaminophen (NORCO) 7.5-325 MG per tablet 1 tablet, 1 tablet, Oral, Q4H PRN, Mango Arnold MD, 1 tablet at 08/04/23 1208    Hydrocortisone Sod Suc (PF) (Solu-CORTEF) injection 100 mg, 100 mg, Intravenous, Once PRN, Tomer Jeong MD    immune globulin (human) (GAMUNEX-C) 5 g infusion 50 mL, 5 g, Intravenous, Q24H **AND** immune globulin (human) (GAMUNEX-C) 20 g infusion 200 mL, 20 g, Intravenous, Q24H **AND** immune globulin (human) (GAMUNEX-C) 20 g infusion 200 mL, 20 g, Intravenous, Q24H, Tomer Jeong MD    insulin glargine (LANTUS, SEMGLEE) injection 30 Units, 30 Units, Subcutaneous, Nightly, Mango Arnold MD, 30 Units at 08/03/23 2111    insulin lispro (HUMALOG/ADMELOG) injection 10 Units, 10 Units, Subcutaneous, TID With Meals, Mango Arnold MD, 10 Units at 08/04/23 1208    insulin lispro (HUMALOG/ADMELOG) injection 2-7 Units, 2-7 Units, Subcutaneous, 4x Daily AC & at Bedtime, Mango Arnold MD, 3 Units at 08/04/23 1208    levothyroxine (SYNTHROID, LEVOTHROID) tablet 25 mcg, 25 mcg, Oral, QAM, Wojciech, Fred SCHOFIELD MD, 25 mcg at 08/04/23 0800    lidocaine (LIDODERM) 5 % 2 patch, 2 patch, Transdermal, Q24H, Tomer Jeong MD, 2 patch at 08/04/23  1204    LORazepam (ATIVAN) tablet 1 mg, 1 mg, Oral, Q PM, Fred Jeffrey MD, 1 mg at 08/03/23 1729    methylPREDNISolone sodium succinate (SOLU-Medrol) injection 60 mg, 60 mg, Intravenous, Daily, Tomer Jeong MD, 60 mg at 08/04/23 0801    montelukast (SINGULAIR) tablet 10 mg, 10 mg, Oral, Nightly, Fred Jeffrey MD, 10 mg at 08/03/23 2111    ondansetron (ZOFRAN) injection 4 mg, 4 mg, Intravenous, Q6H PRN, Fred Jeffrey MD, 4 mg at 08/01/23 2044    pantoprazole (PROTONIX) EC tablet 40 mg, 40 mg, Oral, Q AM, Fred Jeffrey MD, 40 mg at 08/04/23 0546    sennosides-docusate (PERICOLACE) 8.6-50 MG per tablet 2 tablet, 2 tablet, Oral, BID, Kadeem Arnold MD, 2 tablet at 08/03/23 0841    sodium chloride 0.9 % infusion, 50 mL/hr, Intravenous, Continuous, Kadeem Arnold MD, Last Rate: 50 mL/hr at 08/03/23 1549, 50 mL/hr at 08/03/23 1549    tamsulosin (FLOMAX) 24 hr capsule 0.4 mg, 0.4 mg, Oral, Daily, Fred Jeffrey MD, 0.4 mg at 08/04/23 0800    valACYclovir (VALTREX) tablet 1,000 mg, 1,000 mg, Oral, Q24H, Tomer Jeong MD, 1,000 mg at 08/04/23 0801     ASSESSMENT  Chronic ITP   Chronic anemia  Congestive heart failure  Diabetes mellitus  Hypertension  Hypothyroidism  Paroxysmal atrial fibrillation  Pulmonary hypertension  Aortic stenosis  Degenerative disc disease  Compression fracture L5 vertebra  Chronic kidney disease stage IV  Chronic kidney retention with Hoffman catheter    PLAN  CPM  Continue IV steroids and start Rituxan per hematology  Swallow eval  Adjust home medications  Stress ulcer DVT prophylaxis  Supportive care  PT OT  DNR  Discussed with family nursing staff and hematology  Follow closely and further recommendation current hospital course    KADEEM ARNOLD MD    Copied text in this note has been reviewed and is accurate as of 08/04/23

## 2023-08-04 NOTE — PROGRESS NOTES
Subjective     CHIEF COMPLAINT:     Thrombocytopenia    INTERVAL HISTORY:     Patient reports having more pain at the area of shingles last night but the pain is better today. No new areas of rash.      REVIEW OF SYSTEMS:  A comprehensive review of systems was obtained with pertinent positive findings as noted in the interval history above.  All other systems negative.    SCHEDULED MEDS:  budesonide-formoterol, 2 puff, Inhalation, BID  carvedilol, 6.25 mg, Oral, BID With Meals  cholecalciferol, 1,000 Units, Oral, Daily  cyanocobalamin, 1,000 mcg, Intramuscular, Daily  gabapentin, 600 mg, Oral, Q12H  guaiFENesin, 600 mg, Oral, Q12H  hydrALAZINE, 75 mg, Oral, TID  insulin glargine, 30 Units, Subcutaneous, Nightly  insulin lispro, 10 Units, Subcutaneous, TID With Meals  insulin lispro, 2-7 Units, Subcutaneous, 4x Daily AC & at Bedtime  levothyroxine, 25 mcg, Oral, QAM  LORazepam, 1 mg, Oral, Q PM  methylPREDNISolone sodium succinate, 60 mg, Intravenous, Daily  montelukast, 10 mg, Oral, Nightly  pantoprazole, 40 mg, Oral, Q AM  senna-docusate sodium, 2 tablet, Oral, BID  tamsulosin, 0.4 mg, Oral, Daily  valACYclovir, 1,000 mg, Oral, Q24H      Objective   VITAL SIGNS:  Temp:  [97 øF (36.1 øC)-97.7 øF (36.5 øC)] 97 øF (36.1 øC)  Heart Rate:  [52-62] 55  Resp:  [16] 16  BP: (135-199)/(51-83) 156/53     PHYSICAL EXAMINATION:    GENERAL:  The patient appears in fair general condition, not in acute distress.  SKIN: Ecchymosis over the forearms and legs. Shingles over the left lower abdomen slightly less erythematous today. No new areas of shingles.   HEAD:  Normocephalic.  EYES: Pallor.  No jaundice.  NECK:  Supple. No Masses.  LYMPHATICS:  No cervical or supraclavicular lymphadenopathy.  CHEST: Normal respiratory effort.   CARDIAC:  No edema.   ABDOMEN:  Soft. No tenderness.   EXTREMITIES:  No noted deformity.   PSYCH: Normal mood and affect.     RESULT REVIEW:   Results from last 7 days   Lab Units 08/04/23  0771  08/03/23  0712 08/02/23  0544 08/01/23  0934 07/31/23  1920   WBC 10*3/mm3 8.38 8.03 8.74 6.27 8.13   NEUTROS ABS 10*3/mm3 7.04* 6.32 7.24* 5.42 5.81   LYMPHS ABS 10*3/mm3 0.89  --   --   --   --    HEMOGLOBIN g/dL 10.9* 11.0* 10.9* 11.8* 12.1   HEMATOCRIT % 33.1* 32.8* 33.1* 35.9 35.9   PLATELETS 10*3/mm3 48* 39* 28* 16*  16* 17*     Results from last 7 days   Lab Units 08/04/23  0743 08/03/23 0712 08/02/23  0544 07/31/23 1920   SODIUM mmol/L 128* 130* 125* 128*   POTASSIUM mmol/L 4.2 4.2 4.1 4.0   CHLORIDE mmol/L 99 97* 89* 91*   CO2 mmol/L 20.0* 23.6 23.3 25.1   BUN mg/dL 37* 50* 50* 38*   CREATININE mg/dL 1.44* 1.88* 2.02* 1.85*   CALCIUM mg/dL 8.1* 8.5 8.7 9.0   ALBUMIN g/dL 3.0*  --  3.3* 3.6   BILIRUBIN mg/dL 0.3  --  0.4 0.3   ALK PHOS U/L 35*  --  29* 33*   ALT (SGPT) U/L 11  --  11 14   AST (SGOT) U/L 14  --  15 17     Results from last 7 days   Lab Units 07/31/23 1920   INR  1.15*   APTT seconds 25.1         Lab 08/01/23  0934   FOLATE 14.50   VITAMIN B 12 287      Component      Latest Ref Rng 8/3/2023   Hep B Core Total Ab      Negative  Positive !    Hep B S Ab      Non-Reactive  Reactive !    Hepatitis B Surface Ag      Non-Reactive  Non-Reactive         Assessment & Plan   *Severe thrombocytopenia.  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  She has chronic liver disease which is likely contributing.  Spleen was not enlarged on CT on 5/1/2023.  She was found to have a new bruise over the abdomen with no preceding trauma.  7/31/2023: Platelet count 17,000.  Patient was given Solu-Medrol 125 mg IV.  8/1/2023: Platelet count 16,000.  IPF: 15.3%.  7/31/2023: PT and PTT were normal.  8/2/2023: Platelet count 28,000.  8/3/2023: Platelet count 39,000.  8/4/2023: Platelet count 48,000.  Recommended starting Rituxan today and continuing with Rituxan weekly x 4 doses total.       *Vitamin B12 deficiency.  Vitamin B12 was 236 on 3/12/2023.  She has not been taking vitamin  B12.  8/1/2023: Vitamin B12 287.  Patient started on vitamin B12 injections daily.  8/3/2023: Hemoglobin 11.0.  8/4/2023: Hemoglobin 10.9.     *Chronic kidney disease, stage 4.  7/31/2023: Creatinine 1.85.  8/2/2023: Creatinine 2.02.  8/3/2023: Creatinine 1.88.  8/4/2023: Creatinine 1.44.    *Shingles affecting the left side of the abdomen.  She was started on valacyclovir 1 g once daily-dose reduced due to decreased kidney function.  There was some worsening of the rash on 8/3/2023.  She continues valacyclovir 1 g once daily.    *Vaginal yeast infection  Patient had a recent UTI and was treated with levofloxacin.  Patient developed vaginal yeast infection following the antibiotic therapy and was given 1 dose of Diflucan last week.    Dose #2 was given on 8/3/2023.    *Positive Hepatitis B core Ab.  No prior history of hepatitis.      PLAN:     1.  Our plan was to start Rituxan today. However, we will hold this due to the Hepatitis B core antibody being positive. We will consult GI/Hepatology. May need to consider a different form of therapy (Nplate or Promacta).  2.  Continue Solumedrol but reduce the dose to 60 mg IV Q 24 hours.   3.  Continue Valacyclovir. Add Lidoderm patches to help with the pain.     Discussed with family.  Dicussed with the pharmacist.     ADDENDUM:  I discussed the patient's case with Dr. Sherman.  Given that we are unable to start Rituxan, we will give IVIG.  We reviewed her previous records.  She received 1 g/kg using ideal weight x2 days in the past.  She tolerated this dose and she responded.  We will therefore start IVIG today.      I spent 60 minutes taking care of the patient today including history and physical, review of labs, discussion with family and staff, documenting in the medical record and coordination of care.      Tomer Jeong MD  08/04/23

## 2023-08-04 NOTE — CONSULTS
[facility]  Initial Inpatient Consult Note    Referring Provider: Dr. Miller    Reason for Consultation: Positive hepatitis B core antibody    Subjective     History of present illness:      Thank you for allowing us to participate in the care of this patient.  89 y.o. female patient of Dr. Martel with history of CKD stage IV, B12 deficiency, A-fib, DVT, diabetes, gastroparesis, and esophageal dysmotility and diverticuli.  Possible cirrhosis of the liver.    Admitted on 8/1 with worsening severe chronic thrombocytopenia with concerns for ITP and plans to start Rituxan.  In preparation for that she had hepatitis B panel drawn and her hepatitis B core antibody and hepatitis B surface antibody came back positive with no prior history of viral hepatitis.  Hep B surface antigen nonreactive.  She does have history of possible chronic liver disease/cirrhosis noted on Dr. Martel's last note and previous CTs intermittently showing chronic liver disease with steatosis.  She was originally born in [place] and came over to [place] at age 19.  She is unsure of her immunization history.  She denies family history of liver disease.  She is a former smoker and quit in 1969.  She does not drink alcohol.  No previous history of drug use.    Past Medical History:  Past Medical History:   Diagnosis Date    Aortic valve stenosis     s/p tissue AVR    Back pain     CKD (chronic kidney disease)     Colitis due to Clostridioides difficile 01/26/2022    Diastolic dysfunction     Grade 2 per echocardiogram 2013    Diverticulosis     Exertional shortness of breath     Heart disease     Hiatal hernia     Hyperlipidemia     Hypertension     Hyperthyroidism     Hypertriglyceridemia 05/31/2018    Hypothyroidism     L5 compression fracture (HCC) 08/2022    Left ventricular hypertrophy     Legally blind     Liver disease     Low back pain     Macular degeneration     Mitral regurgitation     Osteoarthritis of hip      Osteoporosis     Pancreatitis 2022    Paroxysmal atrial fibrillation     Peripheral neuropathy     Premature ventricular contractions     Pulmonary hypertension     Renal insufficiency syndrome     Type 2 diabetes mellitus     Uterine cancer      Past Surgical History:  Past Surgical History:   Procedure Laterality Date    AORTIC VALVE REPAIR/REPLACEMENT      APPENDECTOMY      CATARACT EXTRACTION      ,     CHOLECYSTECTOMY      COLONOSCOPY      ENDOSCOPY  08/15/2014    no gross lesions in stomach/duodenum, erythrematous mucosa in stomach    EPIDURAL BLOCK      HYSTERECTOMY  2007    STERNOTOMY      UPPER GASTROINTESTINAL ENDOSCOPY        Social History:   Social History     Tobacco Use    Smoking status: Former     Packs/day: 0.50     Types: Cigarettes     Quit date: 7/10/1969     Years since quittin.1    Smokeless tobacco: Never   Substance Use Topics    Alcohol use: No     Comment: caffeine use - coffee 2 cups daily      Family History:  Family History   Problem Relation Age of Onset    Heart disease Mother     Hypertension Mother     Stroke Mother     Diabetes Mother         mellitus    Other Other         cardiovascular disorder       Home Meds:  Medications Prior to Admission   Medication Sig Dispense Refill Last Dose    benzonatate (Tessalon Perles) 100 MG capsule Take 1 capsule by mouth 3 (Three) Times a Day As Needed for Cough. 60 capsule 0     bisacodyl (DULCOLAX) 5 MG EC tablet Take 1 tablet by mouth Daily As Needed for Constipation.       bismuth subsalicylate (PEPTO BISMOL) 262 MG/15ML suspension 30 mL EVERY 6 HOURS (route: oral)       budesonide-formoterol (SYMBICORT) 160-4.5 MCG/ACT inhaler Inhale 2 puffs 2 (Two) Times a Day.       carvedilol (COREG) 12.5 MG tablet Take 1 tablet by mouth 2 (Two) Times a Day With Meals.       cholecalciferol (VITAMIN D3) 25 MCG (1000 UT) tablet Take 1 tablet by mouth Daily.       ciprofloxacin (CIPRO) 250 MG tablet        esomeprazole (nexIUM) 40 MG  capsule Take 1 capsule by mouth Daily. 90 capsule 3     famotidine (PEPCID) 40 MG tablet Take 1 tablet by mouth Every Night. 30 tablet 0     fluticasone-salmeterol (Advair HFA) 115-21 MCG/ACT inhaler Inhale 2 puffs 2 (Two) Times a Day. 12 each 11     furosemide (LASIX) 40 MG tablet Take 1 tablet by mouth 2 (Two) Times a Day. 60 tablet 0     gabapentin (NEURONTIN) 600 MG tablet Take 1 tablet by mouth 2 (Two) Times a Day. 6 tablet 0     guaiFENesin (MUCINEX) 600 MG 12 hr tablet Take 1 tablet by mouth 2 (Two) Times a Day.       hydrALAZINE (APRESOLINE) 100 MG tablet Take 0.5 tablets by mouth 3 (Three) Times a Day. Take 50 mg every morning and 100 mg every evening. May take an addition 50 mg during the day for SBP > 160 mmHg. 60 tablet 11     HYDROcodone-acetaminophen (NORCO) 7.5-325 MG per tablet Take 1 tablet by mouth 5 (Five) Times a Day.       insulin glargine (LANTUS, SEMGLEE) 100 UNIT/ML injection Inject 25 Units under the skin into the appropriate area as directed Every Night.  12     insulin lispro (ADMELOG) 100 UNIT/ML injection Inject 0-9 Units under the skin into the appropriate area as directed 3 (Three) Times a Day Before Meals.  12     ketotifen (ZADITOR) 0.025 % ophthalmic solution 1 drops DAILY (route: ophthalmic (eye))       [] levoFLOXacin (LEVAQUIN) 500 MG tablet Take 1 tablet by mouth Daily. 10 tablet 0     lidocaine (LIDODERM) 5 % Place 1 patch on the skin as directed by provider Daily As Needed for Moderate Pain. Remove & Discard patch within 12 hours or as directed by MD 30 patch 0     loratadine (CLARITIN) 10 MG tablet Take 1 tablet by mouth Daily.       LORazepam (ATIVAN) 0.5 MG tablet Take 1 tablet by mouth Every 8 (Eight) Hours As Needed for Anxiety. (Patient taking differently: Take 2 tablets by mouth Every Evening.) 9 tablet 0     magnesium oxide (MAG-OX) 400 MG tablet Take 0.5 tablets by mouth Every Night. 90 tablet 3     montelukast (SINGULAIR) 10 MG tablet Take 1 tablet by mouth  Every Night.       naloxone (NARCAN) 4 MG/0.1ML nasal spray        ondansetron ODT (ZOFRAN-ODT) 4 MG disintegrating tablet Place 1 tablet on the tongue Every 8 (Eight) Hours As Needed for Nausea.       polyethylene glycol (MIRALAX) 17 GM/SCOOP powder Take 17 g by mouth Daily.       predniSONE (DELTASONE) 10 MG tablet Take 1 tablet by mouth Daily.       Synthroid 25 MCG tablet Take 1 tablet by mouth Daily.       tamsulosin (FLOMAX) 0.4 MG capsule 24 hr capsule Take 1 capsule by mouth Daily.        Current Meds:   acetaminophen, 650 mg, Oral, Q24H  budesonide-formoterol, 2 puff, Inhalation, BID  carvedilol, 3.125 mg, Oral, BID With Meals  cholecalciferol, 1,000 Units, Oral, Daily  cyanocobalamin, 1,000 mcg, Intramuscular, Daily  diphenhydrAMINE, 25 mg, Oral, Q24H  gabapentin, 600 mg, Oral, Q12H  guaiFENesin, 600 mg, Oral, Q12H  hydrALAZINE, 100 mg, Oral, TID  immune globulin (human), 5 g, Intravenous, Q24H   And  immune globulin (human), 20 g, Intravenous, Q24H   And  immune globulin (human), 20 g, Intravenous, Q24H  insulin glargine, 36 Units, Subcutaneous, Nightly  insulin lispro, 12 Units, Subcutaneous, TID With Meals  insulin lispro, 2-7 Units, Subcutaneous, 4x Daily AC & at Bedtime  levothyroxine, 25 mcg, Oral, QAM  lidocaine, 2 patch, Transdermal, Q24H  LORazepam, 1 mg, Oral, Q PM  methylPREDNISolone sodium succinate, 60 mg, Intravenous, Daily  montelukast, 10 mg, Oral, Nightly  pantoprazole, 40 mg, Oral, Q AM  senna-docusate sodium, 2 tablet, Oral, BID  tamsulosin, 0.4 mg, Oral, Daily  valACYclovir, 1,000 mg, Oral, Q24H      Allergies:  Allergies   Allergen Reactions    Baclofen Unknown - Low Severity     Yeast infection    Erythromycin Unknown (See Comments) and Other (See Comments)     Pt states she does not remember but it was many years ago  Pt states she does not remember but it was many years ago    Statins Myalgia    Cephalexin Other (See Comments) and Unknown (See Comments)     June 2022 Pt has  tolerated ceftriaxone and cefepime during admission.   Pt states she does not remember reaction but it was many years ago    Penicillins Rash    Sulfa Antibiotics Itching and Rash     Review of Systems  Respiratory ROS: no cough, shortness of breath, or wheezing  Cardiovascular ROS: no chest pain or dyspnea on exertion    Objective     Vital Signs  Temp:  [97 øF (36.1 øC)-98 øF (36.7 øC)] 98 øF (36.7 øC)  Heart Rate:  [52-60] 60  Resp:  [16] 16  BP: (156-199)/(52-74) 171/74  Physical Exam:   Constitutional:    Alert, cooperative, in no acute distress    Eyes:          conjunctivae and sclerae normal, no icterus    HENT:   Atraumatic, normal hearing, mucosa moist    Neck:   Trachea midline,    Lungs:     normal respiratory effort    Abdomen:     Soft, nondistended, nontender; normal bowel sounds , no organomegaly    Skin:   No jaundice, rash, or pallor; multiple contusions over arms   Neurologic:  No tremors, no asterixis    Psychiatric:  normal mood and affect; A&O x3     Results Review:   I reviewed the patient's new clinical results.    Results from last 7 days   Lab Units 08/04/23  0743 08/03/23  0712 08/02/23  0544   WBC 10*3/mm3 8.38 8.03 8.74   HEMOGLOBIN g/dL 10.9* 11.0* 10.9*   HEMATOCRIT % 33.1* 32.8* 33.1*   PLATELETS 10*3/mm3 48* 39* 28*     Results from last 7 days   Lab Units 08/04/23  0743 08/03/23  0712 08/02/23  0544 07/31/23  1920   SODIUM mmol/L 128* 130* 125* 128*   POTASSIUM mmol/L 4.2 4.2 4.1 4.0   CHLORIDE mmol/L 99 97* 89* 91*   CO2 mmol/L 20.0* 23.6 23.3 25.1   BUN mg/dL 37* 50* 50* 38*   CREATININE mg/dL 1.44* 1.88* 2.02* 1.85*   CALCIUM mg/dL 8.1* 8.5 8.7 9.0   BILIRUBIN mg/dL 0.3  --  0.4 0.3   ALK PHOS U/L 35*  --  29* 33*   ALT (SGPT) U/L 11  --  11 14   AST (SGOT) U/L 14  --  15 17   GLUCOSE mg/dL 275* 234* 228* 98     Results from last 7 days   Lab Units 07/31/23  1920   INR  1.15*     Lab Results   Lab Value Date/Time    LIPASE 21 06/12/2023 1056    LIPASE 37 03/07/2023 1153    LIPASE  26 08/24/2022 1453       Radiology:  XR Spine Lumbar Complete 4+VW   Final Result   1. Compression deformity of L5 with approximately 35% loss of the   vertebral body height. This does not appear acute and appears unchanged   from the 06/15/2023 study.   2. No acute fractures are seen.       This report was finalized on 7/31/2023 7:43 PM by Dr. Herbie Pike M.D.              Assessment & Plan   Active Hospital Problems    Diagnosis     **Thrombocytopenia        Assessment:  Positive hepatitis B core antibody and positive hepatitis B surface antibody  Chronic liver disease shown on multiple CT scans  Hepatic steatosis  Chronic thrombocytopenia  Shingles affecting left side abdomen  CKD stage IV    Plan:  We will go ahead and complete work-up for hepatitis B including viral load  She does have chronic liver disease shown on multiple CT scans in the past although some that do not show it.  She does have a fatty liver so this could be the source.  We will go ahead and update her ultrasound while she is in the hospital.  Hematology wanted to start Rituxan for her severe thrombocytopenia but positive hep B core antibody may prohibit this.  They are considering starting IVIG as alternative.      I discussed the patients findings and my recommendations with patient and family.    Dragon dictation used throughout this note.            Rossy Mayfield PA-C  [facility]  [address] [place] [address]  Office: [phone]

## 2023-08-04 NOTE — NURSING NOTE
Provided printed information from [facility] and reviewed with patient and daughter the use of IVIG for ITP treatment.  She has had IVIG in the past and is somewhat familiar.  Right arm edematous with IV at right antecubital.  Restarted IV in left hand and removed right antecubital IV with arm elevated.  Premedicated as ordered with Tylenol and Benadryl orally.  IVIG started at 1 mg/kg/min (0.6 mL/kg/hour) using ideal body weight of 45 kg = 27 mL/hour via left hand IV site.  Increased after 30 minutes to 2 mg/kg/min (1.2 mL/kg/hour) = 54 mL/hour.  Increased after 60 minutes to max rate based on risk for renal insufficiency of less than 3.3 mg/kg/min (<1.98 mL/kg/hour) not to exceed 3.3 mg/kg/min = 88 mL/hour.  Vital signs stable.  Patient asymptomatic and tolerated well.  Report to Maggie Vázquez RN.  Will maintain infusion at 88 mL/hour until completed due to risk of renal dysfunction per guidelines.

## 2023-08-04 NOTE — THERAPY TREATMENT NOTE
Patient Name: Helena Carmen  : 1931    MRN: [id number]                              Today's Date: 2023       Admit Date: 2023    Visit Dx:     ICD-10-CM ICD-9-CM   1. Uncontrolled hypertension  I10 401.9   2. Thrombocytopenia  D69.6 287.5   3. Hyponatremia  E87.1 276.1   4. History of ITP  Z86.2 V12.3   5. Rash (left flank)  R21 782.1     Patient Active Problem List   Diagnosis    Aortic valve stenosis    Fatigue    MI (mitral incompetence)    PVC (premature ventricular contraction)    Legally blind    Essential hypertension    Hypertriglyceridemia    Pulmonary hypertension    Paroxysmal atrial fibrillation    Anxiety    Stage 4 chronic kidney disease    Chronic pain disorder    Degeneration of lumbar intervertebral disc    Type 2 diabetes mellitus with hyperglycemia    Esophageal reflux    Gastroparesis    Hiatal hernia    Iatrogenic hypothyroidism    Macular degeneration    Malignant neoplasm of uterus    Osteoarthritis of knee    Peripheral nerve disease    Secondary hyperparathyroidism    Thrombocytopenia    Chronic heart failure with preserved ejection fraction    Other cirrhosis of liver    Hypothyroidism    Nonrheumatic tricuspid valve regurgitation    Anemia, chronic disease    Hyponatremia    Closed fracture of fifth lumbar vertebra    Closed fracture of fifth lumbar vertebra, unspecified fracture morphology, initial encounter    Diabetic polyneuropathy associated with type 2 diabetes mellitus    Chronic ITP (idiopathic thrombocytopenia)    Chronic midline low back pain with bilateral sciatica    Acute deep vein thrombosis of calf, bilateral    Compression fracture of L5 vertebra with routine healing    Acute left-sided low back pain with left-sided sciatica    Urine retention    Rib fracture    Acute on chronic combined systolic and diastolic congestive heart failure    Acute UTI    Blindness right eye category 3, blindness left eye category 3    Cognitive communication deficit     Pseudomonas (aeruginosa) (mallei) (pseudomallei) as the cause of diseases classified elsewhere    Multifocal pneumonia     Past Medical History:   Diagnosis Date    Aortic valve stenosis     s/p tissue AVR    Back pain     CKD (chronic kidney disease)     Colitis due to Clostridioides difficile 01/26/2022    Diastolic dysfunction     Grade 2 per echocardiogram 2013    Diverticulosis     Exertional shortness of breath     Heart disease     Hiatal hernia     Hyperlipidemia     Hypertension     Hyperthyroidism     Hypertriglyceridemia 05/31/2018    Hypothyroidism     L5 compression fracture (HCC) 08/2022    Left ventricular hypertrophy     Legally blind     Liver disease     Low back pain     Macular degeneration     Mitral regurgitation     Osteoarthritis of hip     Osteoporosis     Pancreatitis 01/26/2022    Paroxysmal atrial fibrillation     Peripheral neuropathy     Premature ventricular contractions     Pulmonary hypertension     Renal insufficiency syndrome     Type 2 diabetes mellitus     Uterine cancer      Past Surgical History:   Procedure Laterality Date    AORTIC VALVE REPAIR/REPLACEMENT      APPENDECTOMY      CATARACT EXTRACTION      1970, 1999    CHOLECYSTECTOMY      COLONOSCOPY      ENDOSCOPY  08/15/2014    no gross lesions in stomach/duodenum, erythrematous mucosa in stomach    EPIDURAL BLOCK      HYSTERECTOMY  2007    STERNOTOMY      UPPER GASTROINTESTINAL ENDOSCOPY        General Information       Row Name 08/04/23 0950          OT Time and Intention    Document Type therapy note (daily note)  -LE     Mode of Treatment occupational therapy;individual therapy  -       Row Name 08/04/23 0950          General Information    Existing Precautions/Restrictions fall  -LE       Row Name 08/04/23 0950          Cognition    Orientation Status (Cognition) oriented to;person;place  -LE       Row Name 08/04/23 0950          Safety Issues, Functional Mobility    Comment, Safety Issues/Impairments (Mobility) non  skid slipper and gait belt (at axilla due to shiingles low back)  -LE               User Key  (r) = Recorded By, (t) = Taken By, (c) = Cosigned By      Initials Name Provider Type    Jennifer Dias OTR Occupational Therapist                     Mobility/ADL's       Row Name 08/04/23 0950          Bed Mobility    Comment, (Bed Mobility) UIC  -       Row Name 08/04/23 0950          Transfers    Transfers other (see comments);bed-chair transfer;stand-sit transfer;sit-stand transfer  -LE     Comment, (Transfers) first stand with min A and then progresses to CGA/close SBA at walker.  pt demo flexed posture over walker which pt/family report is baseline.   OT ed on body mechanics, hand placement, turn fully to sit.  -       Row Name 08/04/23 0950          Bed-Chair Transfer    Bed-Chair [place] (Transfers) contact guard  -       Row Name 08/04/23 0950          Sit-Stand Transfer    Sit-Stand [place] (Transfers) minimum assist (75% patient effort);contact guard;standby assist;verbal cues  -LE     Assistive Device (Sit-Stand Transfers) walker, front-wheeled  -LE       Row Name 08/04/23 0950          Stand-Sit Transfer    Stand-Sit [place] (Transfers) contact guard;verbal cues;standby assist  -LE     Assistive Device (Stand-Sit Transfers) walker, front-wheeled  -LE       Row Name 08/04/23 0950          Functional Mobility    Functional Mobility- Comment take a few steps to bed and then back to chair.  too fatigue/weak to walk to BR.  -       Row Name 08/04/23 0950          Activities of Daily Living    BADL Assessment/Intervention toileting;feeding;grooming;lower body dressing  -       Row Name 08/04/23 0950          Lower Body Dressing Assessment/Training    [place] Level (Lower Body Dressing) shoes/slippers;don;doff;maximum assist (25% patient effort)  -LE     Position (Lower Body Dressing) supported sitting  -LE     Comment, (Lower Body Dressing) pt pushes slipper half off R foot, unable to  reach and will slipper.  at home pt uses carpet as resistance to slip feet into slippers.  -LE       Row Name 08/04/23 0950          Toileting Assessment/Training    [place] Level (Toileting) dependent (less than 25% patient effort)  -JOSE     Comment, (Toileting) catheter adn brief.  -LE       Row Name 08/04/23 0950          Self-Feeding Assessment/Training    [place] Level (Feeding) set up;verbal cues  -JOSE     Comment, (Feeding) eating with setup and VC for low vision on first attempt.  -LE               User Key  (r) = Recorded By, (t) = Taken By, (c) = Cosigned By      Initials Name Provider Type    Jennifer Dias OTR Occupational Therapist                   Obj/Interventions       Row Name 08/04/23 0954          Vision Assessment/Intervention    Visual Impairment/Limitations legally blind  -JOSE     Vision Assessment Comment OT increases light in room in efffort to increase contrast to see large shapes.  -LE               User Key  (r) = Recorded By, (t) = Taken By, (c) = Cosigned By      Initials Name Provider Type    Jennifer Dias OTR Occupational Therapist                   Goals/Plan    No documentation.                  Clinical Impression       Row Name 08/04/23 0946          Pain Assessment    Pretreatment Pain Rating 6/10  -LE     Posttreatment Pain Rating 6/10  -LE     Pre/Posttreatment Pain Comment back.  -LE     Pain Intervention(s) Repositioned;Emotional support  -       Row Name 08/04/23 0946          Plan of Care Review    Plan of Care Reviewed With patient;daughter  -LE     Outcome Evaluation Pt presents to OT up in chair with family present.  Pt is able to complete stand pivot xfers bed to/from chair but weakness, pain and fatigue lmit attempt to walk to BR.  Pt works on will/doff slippers with up to max A for task.  Back pain limits attempt to reach feet for ADL tasks. OT ed on transfer tech, body mechanics and adaptations for low vision during tasks.   Will cont to follow for  skilled OT.  -JOSE       Row Name 08/04/23 0946          Vital Signs    O2 Delivery Pre Treatment room air  -LE     O2 Delivery Intra Treatment room air  -LE     O2 Delivery Post Treatment room air  -LE     Pre Patient Position Sitting  -LE     Intra Patient Position Standing  -LE     Post Patient Position Sitting  -LE     Activity Duration --  SOA, fatigue noted.  -JOSE       Row Name 08/04/23 0946          Positioning and Restraints    Pre-Treatment Position sitting in chair/recliner  -LE     Post Treatment Position chair  -LE     In Chair reclined;call light within reach;encouraged to call for assist;with family/caregiver  with MD CARROLL               User Key  (r) = Recorded By, (t) = Taken By, (c) = Cosigned By      Initials Name Provider Type    Jennifer Dias OTR Occupational Therapist                   Outcome Measures       Row Name 08/04/23 0955          How much help from another is currently needed...    Putting on and taking off regular lower body clothing? 2  -LE     Bathing (including washing, rinsing, and drying) 2  -LE     Toileting (which includes using toilet bed pan or urinal) 1  -LE     Putting on and taking off regular upper body clothing 3  -LE     Taking care of personal grooming (such as brushing teeth) 3  -LE     Eating meals 3  -LE     AM-PAC 6 Clicks Score (OT) 14  -LE       Row Name 08/04/23 0897          How much help from another person do you currently need...    Turning from your back to your side while in flat bed without using bedrails? 3  -EH     Moving from lying on back to sitting on the side of a flat bed without bedrails? 3  -EH     Moving to and from a bed to a chair (including a wheelchair)? 3  -EH     Standing up from a chair using your arms (e.g., wheelchair, bedside chair)? 3  -EH     Climbing 3-5 steps with a railing? 2  -EH     To walk in hospital room? 2  -EH     AM-PAC 6 Clicks Score (PT) 16  -EH     Highest level of mobility 5 --> Static standing  -EH       Row Name  08/04/23 0955          Functional Assessment    Outcome Measure Options AM-PAC 6 Clicks Daily Activity (OT)  -LE               User Key  (r) = Recorded By, (t) = Taken By, (c) = Cosigned By      Initials Name Provider Type    Jennifer Dias OTR Occupational Therapist    Maggie Casas, RN Registered Nurse                    Occupational Therapy Education       Title: PT OT SLP Therapies (In Progress)       Topic: Occupational Therapy (In Progress)       Point: ADL training (Done)       Description:   Instruct learner(s) on proper safety adaptation and remediation techniques during self care or transfers.   Instruct in proper use of assistive devices.                  Learning Progress Summary             Patient Acceptance, E, VU by  at 8/2/2023 1249    Comment: role of OT, plan of care, safety                         Point: Home exercise program (Not Started)       Description:   Instruct learner(s) on appropriate technique for monitoring, assisting and/or progressing therapeutic exercises/activities.                  Learner Progress:  Not documented in this visit.              Point: Precautions (Done)       Description:   Instruct learner(s) on prescribed precautions during self-care and functional transfers.                  Learning Progress Summary             Patient Acceptance, E, VU by  at 8/2/2023 1249    Comment: role of OT, plan of care, safety                         Point: Body mechanics (Done)       Description:   Instruct learner(s) on proper positioning and spine alignment during self-care, functional mobility activities and/or exercises.                  Learning Progress Summary             Patient Acceptance, E, VU by  at 8/2/2023 1249    Comment: role of OT, plan of care, safety                                         User Key       Initials Effective Dates Name Provider Type Lotus     06/10/21 -  Yris Cho OT Occupational Therapist OT                  OT Recommendation  and Plan     Plan of Care Review  Plan of Care Reviewed With: patient, daughter  Outcome Evaluation: Pt presents to OT up in chair with family present.  Pt is able to complete stand pivot xfers bed to/from chair but weakness, pain and fatigue lmit attempt to walk to BR.  Pt works on will/doff slippers with up to max A for task.  Back pain limits attempt to reach feet for ADL tasks. OT ed on transfer tech, body mechanics and adaptations for low vision during tasks.   Will cont to follow for skilled OT.     Time Calculation:         Time Calculation- OT       Row Name 08/04/23 0955             Time Calculation- OT    OT Start Time 0920  -LE      OT Stop Time 0946  -LE      OT Time Calculation (min) 26 min  -LE      Total Timed Code Minutes- OT 26 minute(s)  -LE      OT Received On 08/04/23  -LE      OT - Next Appointment 08/07/23  -LE         Timed Charges    15347 - OT Therapeutic Activity Minutes 10  -LE      93875 - OT Self Care/Mgmt Minutes 16  -LE         Total Minutes    Timed Charges Total Minutes 26  -LE       Total Minutes 26  -LE                User Key  (r) = Recorded By, (t) = Taken By, (c) = Cosigned By      Initials Name Provider Type    Jennifer Dias OTR Occupational Therapist                  Therapy Charges for Today       Code Description Service Date Service Provider Modifiers Qty    55350384811 HC OT THERAPEUTIC ACT EA 15 MIN 8/4/2023 Jennifer Zavaleta OTR GO 1    00927942703 HC OT SELF CARE/MGMT/TRAIN EA 15 MIN 8/4/2023 Jennifer Zavaleta OTR GO 1                 PEREZ Vora  8/4/2023

## 2023-08-05 ENCOUNTER — APPOINTMENT (OUTPATIENT)
Dept: ULTRASOUND IMAGING | Facility: HOSPITAL | Age: 88
DRG: 813 | End: 2023-08-05
Payer: MEDICARE

## 2023-08-05 LAB
ALBUMIN SERPL-MCNC: 3.3 G/DL (ref 3.5–5.2)
ALBUMIN/GLOB SERPL: 1 G/DL
ALP SERPL-CCNC: 28 U/L (ref 39–117)
ALT SERPL W P-5'-P-CCNC: 13 U/L (ref 1–33)
ANION GAP SERPL CALCULATED.3IONS-SCNC: 9.1 MMOL/L (ref 5–15)
AST SERPL-CCNC: 16 U/L (ref 1–32)
BASOPHILS # BLD AUTO: 0.01 10*3/MM3 (ref 0–0.2)
BASOPHILS NFR BLD AUTO: 0.1 % (ref 0–1.5)
BILIRUB SERPL-MCNC: 0.4 MG/DL (ref 0–1.2)
BUN SERPL-MCNC: 41 MG/DL (ref 8–23)
BUN/CREAT SERPL: 31.1 (ref 7–25)
CALCIUM SPEC-SCNC: 8.4 MG/DL (ref 8.2–9.6)
CHLORIDE SERPL-SCNC: 101 MMOL/L (ref 98–107)
CO2 SERPL-SCNC: 22.9 MMOL/L (ref 22–29)
CREAT SERPL-MCNC: 1.32 MG/DL (ref 0.57–1)
DEPRECATED RDW RBC AUTO: 50.8 FL (ref 37–54)
EGFRCR SERPLBLD CKD-EPI 2021: 38 ML/MIN/1.73
EOSINOPHIL # BLD AUTO: 0 10*3/MM3 (ref 0–0.4)
EOSINOPHIL NFR BLD AUTO: 0 % (ref 0.3–6.2)
ERYTHROCYTE [DISTWIDTH] IN BLOOD BY AUTOMATED COUNT: 16.4 % (ref 12.3–15.4)
GLOBULIN UR ELPH-MCNC: 3.4 GM/DL
GLUCOSE BLDC GLUCOMTR-MCNC: 145 MG/DL (ref 70–130)
GLUCOSE BLDC GLUCOMTR-MCNC: 154 MG/DL (ref 70–130)
GLUCOSE BLDC GLUCOMTR-MCNC: 370 MG/DL (ref 70–130)
GLUCOSE BLDC GLUCOMTR-MCNC: 462 MG/DL (ref 70–130)
GLUCOSE SERPL-MCNC: 142 MG/DL (ref 65–99)
HAV AB SER QL IA: POSITIVE
HBV E AG SERPL QL IA: NEGATIVE
HCT VFR BLD AUTO: 33.5 % (ref 34–46.6)
HGB BLD-MCNC: 10.9 G/DL (ref 12–15.9)
LYMPHOCYTES # BLD AUTO: 0.7 10*3/MM3 (ref 0.7–3.1)
LYMPHOCYTES NFR BLD AUTO: 10 % (ref 19.6–45.3)
MCH RBC QN AUTO: 27.5 PG (ref 26.6–33)
MCHC RBC AUTO-ENTMCNC: 32.5 G/DL (ref 31.5–35.7)
MCV RBC AUTO: 84.6 FL (ref 79–97)
MONOCYTES # BLD AUTO: 0.59 10*3/MM3 (ref 0.1–0.9)
MONOCYTES NFR BLD AUTO: 8.5 % (ref 5–12)
NEUTROPHILS NFR BLD AUTO: 5.57 10*3/MM3 (ref 1.7–7)
NEUTROPHILS NFR BLD AUTO: 80 % (ref 42.7–76)
PLATELET # BLD AUTO: 57 10*3/MM3 (ref 140–450)
PMV BLD AUTO: 11.2 FL (ref 6–12)
POTASSIUM SERPL-SCNC: 4.6 MMOL/L (ref 3.5–5.2)
PROT SERPL-MCNC: 6.7 G/DL (ref 6–8.5)
RBC # BLD AUTO: 3.96 10*6/MM3 (ref 3.77–5.28)
SODIUM SERPL-SCNC: 133 MMOL/L (ref 136–145)
WBC NRBC COR # BLD: 6.97 10*3/MM3 (ref 3.4–10.8)

## 2023-08-05 PROCEDURE — 25010000002 CYANOCOBALAMIN PER 1000 MCG: Performed by: INTERNAL MEDICINE

## 2023-08-05 PROCEDURE — 85025 COMPLETE CBC W/AUTO DIFF WBC: CPT | Performed by: INTERNAL MEDICINE

## 2023-08-05 PROCEDURE — 82948 REAGENT STRIP/BLOOD GLUCOSE: CPT

## 2023-08-05 PROCEDURE — 76705 ECHO EXAM OF ABDOMEN: CPT

## 2023-08-05 PROCEDURE — 25010000002 IMMUNE GLOBULIN (HUMAN) 20 GM/200ML SOLUTION: Performed by: INTERNAL MEDICINE

## 2023-08-05 PROCEDURE — 80053 COMPREHEN METABOLIC PANEL: CPT | Performed by: HOSPITALIST

## 2023-08-05 PROCEDURE — 94799 UNLISTED PULMONARY SVC/PX: CPT

## 2023-08-05 PROCEDURE — 63710000001 INSULIN GLARGINE PER 5 UNITS: Performed by: HOSPITALIST

## 2023-08-05 PROCEDURE — 99231 SBSQ HOSP IP/OBS SF/LOW 25: CPT | Performed by: INTERNAL MEDICINE

## 2023-08-05 PROCEDURE — 94664 DEMO&/EVAL PT USE INHALER: CPT

## 2023-08-05 PROCEDURE — 25010000002 IMMUNE GLOBULIN (HUMAN) 5 GM/50ML SOLUTION: Performed by: INTERNAL MEDICINE

## 2023-08-05 PROCEDURE — 63710000001 INSULIN LISPRO (HUMAN) PER 5 UNITS: Performed by: HOSPITALIST

## 2023-08-05 PROCEDURE — 92610 EVALUATE SWALLOWING FUNCTION: CPT

## 2023-08-05 PROCEDURE — 63710000001 DIPHENHYDRAMINE PER 50 MG: Performed by: INTERNAL MEDICINE

## 2023-08-05 PROCEDURE — 99232 SBSQ HOSP IP/OBS MODERATE 35: CPT | Performed by: INTERNAL MEDICINE

## 2023-08-05 PROCEDURE — 25010000002 METHYLPREDNISOLONE PER 125 MG: Performed by: INTERNAL MEDICINE

## 2023-08-05 RX ORDER — METHYLPREDNISOLONE SODIUM SUCCINATE 40 MG/ML
40 INJECTION, POWDER, LYOPHILIZED, FOR SOLUTION INTRAMUSCULAR; INTRAVENOUS DAILY
Status: DISCONTINUED | OUTPATIENT
Start: 2023-08-06 | End: 2023-08-07

## 2023-08-05 RX ADMIN — SENNOSIDES AND DOCUSATE SODIUM 2 TABLET: 50; 8.6 TABLET ORAL at 09:06

## 2023-08-05 RX ADMIN — GUAIFENESIN 600 MG: 600 TABLET, EXTENDED RELEASE ORAL at 09:08

## 2023-08-05 RX ADMIN — METHYLPREDNISOLONE SODIUM SUCCINATE 60 MG: 125 INJECTION, POWDER, FOR SOLUTION INTRAMUSCULAR; INTRAVENOUS at 09:07

## 2023-08-05 RX ADMIN — HYDROCODONE BITARTRATE AND ACETAMINOPHEN 1 TABLET: 7.5; 325 TABLET ORAL at 11:05

## 2023-08-05 RX ADMIN — HYDROCODONE BITARTRATE AND ACETAMINOPHEN 1 TABLET: 7.5; 325 TABLET ORAL at 20:40

## 2023-08-05 RX ADMIN — LIDOCAINE 2 PATCH: 700 PATCH TOPICAL at 09:07

## 2023-08-05 RX ADMIN — INSULIN LISPRO 7 UNITS: 100 INJECTION, SOLUTION INTRAVENOUS; SUBCUTANEOUS at 20:34

## 2023-08-05 RX ADMIN — IMMUNE GLOBULIN (HUMAN) 5 G: 10 INJECTION INTRAVENOUS; SUBCUTANEOUS at 16:08

## 2023-08-05 RX ADMIN — CARVEDILOL 3.12 MG: 3.12 TABLET, FILM COATED ORAL at 17:13

## 2023-08-05 RX ADMIN — GUAIFENESIN 600 MG: 600 TABLET, EXTENDED RELEASE ORAL at 20:35

## 2023-08-05 RX ADMIN — HYDRALAZINE HYDROCHLORIDE 100 MG: 50 TABLET, FILM COATED ORAL at 09:08

## 2023-08-05 RX ADMIN — HYDROCODONE BITARTRATE AND ACETAMINOPHEN 1 TABLET: 7.5; 325 TABLET ORAL at 06:06

## 2023-08-05 RX ADMIN — MONTELUKAST SODIUM 10 MG: 10 TABLET, FILM COATED ORAL at 20:35

## 2023-08-05 RX ADMIN — HYDROCODONE BITARTRATE AND ACETAMINOPHEN 1 TABLET: 7.5; 325 TABLET ORAL at 01:00

## 2023-08-05 RX ADMIN — INSULIN LISPRO 6 UNITS: 100 INJECTION, SOLUTION INTRAVENOUS; SUBCUTANEOUS at 17:13

## 2023-08-05 RX ADMIN — BUDESONIDE AND FORMOTEROL FUMARATE DIHYDRATE 2 PUFF: 160; 4.5 AEROSOL RESPIRATORY (INHALATION) at 08:18

## 2023-08-05 RX ADMIN — HYDRALAZINE HYDROCHLORIDE 100 MG: 50 TABLET, FILM COATED ORAL at 16:44

## 2023-08-05 RX ADMIN — LORAZEPAM 1 MG: 1 TABLET ORAL at 20:35

## 2023-08-05 RX ADMIN — IMMUNE GLOBULIN (HUMAN) 20 G: 10 INJECTION INTRAVENOUS; SUBCUTANEOUS at 17:09

## 2023-08-05 RX ADMIN — ACETAMINOPHEN 650 MG: 325 TABLET, FILM COATED ORAL at 15:29

## 2023-08-05 RX ADMIN — Medication 1000 UNITS: at 09:07

## 2023-08-05 RX ADMIN — TAMSULOSIN HYDROCHLORIDE 0.4 MG: 0.4 CAPSULE ORAL at 09:08

## 2023-08-05 RX ADMIN — CARVEDILOL 3.12 MG: 3.12 TABLET, FILM COATED ORAL at 09:07

## 2023-08-05 RX ADMIN — GABAPENTIN 600 MG: 300 CAPSULE ORAL at 09:06

## 2023-08-05 RX ADMIN — DIPHENHYDRAMINE HYDROCHLORIDE 25 MG: 25 CAPSULE ORAL at 15:29

## 2023-08-05 RX ADMIN — SENNOSIDES AND DOCUSATE SODIUM 2 TABLET: 50; 8.6 TABLET ORAL at 20:35

## 2023-08-05 RX ADMIN — IMMUNE GLOBULIN (HUMAN) 20 G: 10 INJECTION INTRAVENOUS; SUBCUTANEOUS at 20:35

## 2023-08-05 RX ADMIN — VALACYCLOVIR HYDROCHLORIDE 1000 MG: 500 TABLET, FILM COATED ORAL at 09:08

## 2023-08-05 RX ADMIN — LEVOTHYROXINE SODIUM 25 MCG: 0.03 TABLET ORAL at 09:08

## 2023-08-05 RX ADMIN — INSULIN LISPRO 12 UNITS: 100 INJECTION, SOLUTION INTRAVENOUS; SUBCUTANEOUS at 17:13

## 2023-08-05 RX ADMIN — CYANOCOBALAMIN 1000 MCG: 1000 INJECTION, SOLUTION INTRAMUSCULAR; SUBCUTANEOUS at 09:07

## 2023-08-05 RX ADMIN — INSULIN GLARGINE 36 UNITS: 100 INJECTION, SOLUTION SUBCUTANEOUS at 20:35

## 2023-08-05 RX ADMIN — GABAPENTIN 600 MG: 300 CAPSULE ORAL at 20:35

## 2023-08-05 RX ADMIN — BUDESONIDE AND FORMOTEROL FUMARATE DIHYDRATE 2 PUFF: 160; 4.5 AEROSOL RESPIRATORY (INHALATION) at 21:08

## 2023-08-05 RX ADMIN — HYDROCODONE BITARTRATE AND ACETAMINOPHEN 1 TABLET: 7.5; 325 TABLET ORAL at 15:29

## 2023-08-05 RX ADMIN — HYDRALAZINE HYDROCHLORIDE 100 MG: 50 TABLET, FILM COATED ORAL at 20:35

## 2023-08-05 NOTE — PROGRESS NOTES
[facility]  Inpatient Progress Note    Reason for Follow Up: Positive hep B core antibody    Subjective     Interval History:   Hep B quantitative PCR is pending.  Patient denies any current GI related complaints.    Current Facility-Administered Medications:     budesonide-formoterol (SYMBICORT) 160-4.5 MCG/ACT inhaler 2 puff, 2 puff, Inhalation, BID, Fred Jeffrey MD, 2 puff at 08/05/23 0818    carvedilol (COREG) tablet 3.125 mg, 3.125 mg, Oral, BID With Meals, Mango Arnold MD, 3.125 mg at 08/05/23 1713    cholecalciferol (VITAMIN D3) tablet 1,000 Units, 1,000 Units, Oral, Daily, Fred Jeffrey MD, 1,000 Units at 08/05/23 0907    cyanocobalamin injection 1,000 mcg, 1,000 mcg, Intramuscular, Daily, Tomer Jeong MD, 1,000 mcg at 08/05/23 0907    diphenhydrAMINE (BENADRYL) injection 50 mg, 50 mg, Intravenous, Once PRN, Tomer Jeong MD    famotidine (PEPCID) injection 20 mg, 20 mg, Intravenous, Once PRN, Tomer Jeong MD    gabapentin (NEURONTIN) capsule 600 mg, 600 mg, Oral, Q12H, Fred Jeffrey MD, 600 mg at 08/05/23 0906    guaiFENesin (MUCINEX) 12 hr tablet 600 mg, 600 mg, Oral, Q12H, Mango Arnold MD, 600 mg at 08/05/23 0908    hydrALAZINE (APRESOLINE) tablet 100 mg, 100 mg, Oral, TID, Mango Arnold MD, 100 mg at 08/05/23 1644    HYDROcodone-acetaminophen (NORCO) 7.5-325 MG per tablet 1 tablet, 1 tablet, Oral, Q4H PRN, Mango Arnold MD, 1 tablet at 08/05/23 1529    Hydrocortisone Sod Suc (PF) (Solu-CORTEF) injection 100 mg, 100 mg, Intravenous, Once PRN, Tomer Jeong MD    [COMPLETED] immune globulin (human) (GAMUNEX-C) 5 g infusion 50 mL, 5 g, Intravenous, Q24H, Last Rate: 54 mL/hr at 08/05/23 1645, Rate Change at 08/05/23 1645 **AND** [COMPLETED] immune globulin (human) (GAMUNEX-C) 20 g infusion 200 mL, 20 g, Intravenous, Q24H, Last Rate: 88 mL/hr at 08/05/23 1712, Rate Change at 08/05/23 1712 **AND** immune globulin (human) (GAMUNEX-C) 20 g  infusion 200 mL, 20 g, Intravenous, Q24H, Tomer Jeong MD, 20 g at 08/04/23 2241    insulin glargine (LANTUS, SEMGLEE) injection 36 Units, 36 Units, Subcutaneous, Nightly, Mango Arnold MD, 36 Units at 08/04/23 2107    insulin lispro (HUMALOG/ADMELOG) injection 12 Units, 12 Units, Subcutaneous, TID With Meals, Mango Arnold MD, 12 Units at 08/05/23 1713    insulin lispro (HUMALOG/ADMELOG) injection 2-7 Units, 2-7 Units, Subcutaneous, 4x Daily AC & at Bedtime, Mango Arnold MD, 6 Units at 08/05/23 1713    levothyroxine (SYNTHROID, LEVOTHROID) tablet 25 mcg, 25 mcg, Oral, QAM, Fred Jeffrey MD, 25 mcg at 08/05/23 0908    lidocaine (LIDODERM) 5 % 2 patch, 2 patch, Transdermal, Q24H, Tomer Jeong MD, 2 patch at 08/05/23 0907    LORazepam (ATIVAN) tablet 1 mg, 1 mg, Oral, Q PM, Fred Jeffrey MD, 1 mg at 08/04/23 2107    [START ON 8/6/2023] methylPREDNISolone sodium succinate (SOLU-Medrol) injection 40 mg, 40 mg, Intravenous, Daily, Tomer Jeong MD    montelukast (SINGULAIR) tablet 10 mg, 10 mg, Oral, Nightly, Fred Jeffrey MD, 10 mg at 08/04/23 2107    ondansetron (ZOFRAN) injection 4 mg, 4 mg, Intravenous, Q6H PRN, Fred Jeffrey MD, 4 mg at 08/01/23 2044    pantoprazole (PROTONIX) EC tablet 40 mg, 40 mg, Oral, Q AM, Fred Jeffrey MD, 40 mg at 08/04/23 0546    sennosides-docusate (PERICOLACE) 8.6-50 MG per tablet 2 tablet, 2 tablet, Oral, BID, Mango Arnold MD, 2 tablet at 08/05/23 0906    tamsulosin (FLOMAX) 24 hr capsule 0.4 mg, 0.4 mg, Oral, Daily, Fred Jeffrey MD, 0.4 mg at 08/05/23 0908    valACYclovir (VALTREX) tablet 1,000 mg, 1,000 mg, Oral, Q24H, Tomer Jeong MD, 1,000 mg at 08/05/23 0908  Review of Systems:    All systems were reviewed and negative except for:  Gastrointestinal: positive for  See HPI    Objective     Vital Signs  Temp:  [97.7 øF (36.5 øC)-98 øF (36.7 øC)] 97.9 øF (36.6 øC)  Heart Rate:  [55-70] 58  Resp:  [14-16] 16  BP: (132-180)/(44-67)  150/59  Body mass index is 37.15 kg/mý.  No intake or output data in the 24 hours ending 08/05/23 1841  No intake/output data recorded.     Physical Exam:   General: patient awake, alert and cooperative   Eyes: Normal lids and lashes, no scleral icterus   Neck: supple, normal ROM   Skin: warm and dry, not jaundiced   Cardiovascular: regular rhythm and rate, no murmurs auscultated   Pulm: clear to auscultation bilaterally, regular and unlabored   Abdomen: soft, nontender, nondistended; normal bowel sounds   Extremities: no rash or edema   Psychiatric: Normal mood and behavior; memory intact     Results Review:     I reviewed the patient's new clinical results.    Results from last 7 days   Lab Units 08/05/23  0701 08/04/23 0743 08/03/23  0712   WBC 10*3/mm3 6.97 8.38 8.03   HEMOGLOBIN g/dL 10.9* 10.9* 11.0*   HEMATOCRIT % 33.5* 33.1* 32.8*   PLATELETS 10*3/mm3 57* 48* 39*     Results from last 7 days   Lab Units 08/05/23  0701 08/04/23  0743 08/03/23  0712 08/02/23  0544   SODIUM mmol/L 133* 128* 130* 125*   POTASSIUM mmol/L 4.6 4.2 4.2 4.1   CHLORIDE mmol/L 101 99 97* 89*   CO2 mmol/L 22.9 20.0* 23.6 23.3   BUN mg/dL 41* 37* 50* 50*   CREATININE mg/dL 1.32* 1.44* 1.88* 2.02*   CALCIUM mg/dL 8.4 8.1* 8.5 8.7   BILIRUBIN mg/dL 0.4 0.3  --  0.4   ALK PHOS U/L 28* 35*  --  29*   ALT (SGPT) U/L 13 11  --  11   AST (SGOT) U/L 16 14  --  15   GLUCOSE mg/dL 142* 275* 234* 228*     Results from last 7 days   Lab Units 07/31/23  1920   INR  1.15*     Lab Results   Lab Value Date/Time    LIPASE 21 06/12/2023 1056    LIPASE 37 03/07/2023 1153    LIPASE 26 08/24/2022 1453       Radiology:  US Liver   Final Result   Previous cholecystectomy, otherwise unremarkable       This report was finalized on 8/5/2023 6:08 AM by Dr. Oziel Darling M.D.          XR Spine Lumbar Complete 4+VW   Final Result   1. Compression deformity of L5 with approximately 35% loss of the   vertebral body height. This does not appear acute and appears  unchanged   from the 06/15/2023 study.   2. No acute fractures are seen.       This report was finalized on 7/31/2023 7:43 PM by Dr. Herbie Pike M.D.          FL Esophagram Complete Single Contrast    (Results Pending)       Assessment & Plan     Active Hospital Problems    Diagnosis     **Thrombocytopenia        Assessment:  Positive hep B core antibody and positive hep B surface antibody  CT scan consistent with chronic liver disease  Hepatic steatosis  Thrombocytopenia  CKD stage IV      Plan:  Await viral studies for hep B  I discussed the patients findings and my recommendations with patient and family.    Alex Raymond MD

## 2023-08-05 NOTE — PLAN OF CARE
Goal Outcome Evaluation:                      BSE complete. At this time recommend patient is on a soft/slick diet with thin liquids with upright position for all PO, slow rate, small bites/sips, alternating bites/sips and medications whole as tolerated. Recommend SLP follow up for diet tolerance.   Reviewed recs with RN, patient, and family. All verbalize understanding.

## 2023-08-05 NOTE — NURSING NOTE
Pt has no diet order in. Reached out to Md to clarify order. Per Md defer to speech. Paged speech and left 2 messages. Awaiting callback

## 2023-08-05 NOTE — PROGRESS NOTES
Subjective     CHIEF COMPLAINT:     Thrombocytopenia    INTERVAL HISTORY:     Patient reports some improvement in the pain at the site of shingles.  She is going to have swallow evaluation done and she is currently n.p.o. while waiting for the test.    REVIEW OF SYSTEMS:  A comprehensive review of systems was obtained with pertinent positive findings as noted in the interval history above.  All other systems negative.    SCHEDULED MEDS:  acetaminophen, 650 mg, Oral, Q24H  budesonide-formoterol, 2 puff, Inhalation, BID  carvedilol, 3.125 mg, Oral, BID With Meals  cholecalciferol, 1,000 Units, Oral, Daily  cyanocobalamin, 1,000 mcg, Intramuscular, Daily  diphenhydrAMINE, 25 mg, Oral, Q24H  gabapentin, 600 mg, Oral, Q12H  guaiFENesin, 600 mg, Oral, Q12H  hydrALAZINE, 100 mg, Oral, TID  immune globulin (human), 5 g, Intravenous, Q24H   And  immune globulin (human), 20 g, Intravenous, Q24H   And  immune globulin (human), 20 g, Intravenous, Q24H  insulin glargine, 36 Units, Subcutaneous, Nightly  insulin lispro, 12 Units, Subcutaneous, TID With Meals  insulin lispro, 2-7 Units, Subcutaneous, 4x Daily AC & at Bedtime  levothyroxine, 25 mcg, Oral, QAM  lidocaine, 2 patch, Transdermal, Q24H  LORazepam, 1 mg, Oral, Q PM  methylPREDNISolone sodium succinate, 60 mg, Intravenous, Daily  montelukast, 10 mg, Oral, Nightly  pantoprazole, 40 mg, Oral, Q AM  senna-docusate sodium, 2 tablet, Oral, BID  tamsulosin, 0.4 mg, Oral, Daily  valACYclovir, 1,000 mg, Oral, Q24H      Objective   VITAL SIGNS:  Temp:  [97 øF (36.1 øC)-98 øF (36.7 øC)] 97.7 øF (36.5 øC)  Heart Rate:  [55-64] 57  Resp:  [14-18] 14  BP: (126-185)/(50-78) 180/52     PHYSICAL EXAMINATION:    GENERAL:  The patient appears in fair general condition, not in acute distress.  SKIN: No new areas of ecchymosis.  Shingles over the left side of the abdomen with some improvements.  HEAD:  Normocephalic.  EYES: Pallor.  No jaundice.  NECK:  Supple. No Masses.  CHEST: Normal  respiratory effort.   CARDIAC:  No edema.   ABDOMEN: Soft.  No tenderness.  EXTREMITIES:  No noted deformity.   PSYCH: Normal mood and affect.     RESULT REVIEW:   Results from last 7 days   Lab Units 08/05/23  0701 08/04/23 0743 08/03/23  0712 08/02/23  0544 08/01/23  0934   WBC 10*3/mm3 6.97 8.38 8.03 8.74 6.27   NEUTROS ABS 10*3/mm3 5.57 7.04* 6.32 7.24* 5.42   LYMPHS ABS 10*3/mm3  --  0.89  --   --   --    HEMOGLOBIN g/dL 10.9* 10.9* 11.0* 10.9* 11.8*   HEMATOCRIT % 33.5* 33.1* 32.8* 33.1* 35.9   PLATELETS 10*3/mm3 57* 48* 39* 28* 16*  16*     Results from last 7 days   Lab Units 08/05/23  0701 08/04/23 0743 08/03/23 0712 08/02/23 0544 07/31/23  1920   SODIUM mmol/L 133* 128* 130* 125* 128*   POTASSIUM mmol/L 4.6 4.2 4.2 4.1 4.0   CHLORIDE mmol/L 101 99 97* 89* 91*   CO2 mmol/L 22.9 20.0* 23.6 23.3 25.1   BUN mg/dL 41* 37* 50* 50* 38*   CREATININE mg/dL 1.32* 1.44* 1.88* 2.02* 1.85*   CALCIUM mg/dL 8.4 8.1* 8.5 8.7 9.0   ALBUMIN g/dL 3.3* 3.0*  --  3.3* 3.6   BILIRUBIN mg/dL 0.4 0.3  --  0.4 0.3   ALK PHOS U/L 28* 35*  --  29* 33*   ALT (SGPT) U/L 13 11  --  11 14   AST (SGOT) U/L 16 14  --  15 17     Results from last 7 days   Lab Units 07/31/23  1920   INR  1.15*   APTT seconds 25.1         Lab 08/01/23  0934   FOLATE 14.50   VITAMIN B 12 287      Component      Latest Ref Rng 8/3/2023   Hep B Core Total Ab      Negative  Positive !    Hep B S Ab      Non-Reactive  Reactive !    Hepatitis B Surface Ag      Non-Reactive  Non-Reactive         Assessment & Plan   *Severe thrombocytopenia.  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  She has chronic liver disease which might be contributing.  She was previously treated with IVIG with good response.  Spleen was not enlarged on CT on 5/1/2023.  She was found to have a new bruise over the abdomen with no preceding trauma.  7/31/2023: Platelet count 17,000.  Patient was given Solu-Medrol 125 mg IV.  8/1/2023: Platelet count 16,000.  IPF:  15.3%.  7/31/2023: PT and PTT were normal.  8/2/2023: Platelet count 28,000.  8/3/2023: Platelet count 39,000.  8/4/2023: Platelet count 48,000.  Rituxan was initially recommended.  However, she was found to have positive hepatitis B core antibody.  Therefore, Rituxan was not recommended.  IVIG 45 g (1 g/kg ideal body weight) daily x2 days was recommended.  IVIG started on 8/4/2023.  Solu-Medrol dose was reduced to 60 mg daily on 8/4/2023.  8/5/2023: Platelet count 57,000.     *Vitamin B12 deficiency.  Vitamin B12 was 236 on 3/12/2023.  She has not been taking vitamin B12.  8/1/2023: Vitamin B12 287.  Patient started on vitamin B12 injections daily.  8/3/2023: Hemoglobin 11.0.  8/4/2023: Hemoglobin 10.9.  8/5/2023: Hemoglobin 10.9.     *Chronic kidney disease, stage 4.  7/31/2023: Creatinine 1.85.  8/2/2023: Creatinine 2.02.  8/3/2023: Creatinine 1.88.  8/4/2023: Creatinine 1.44.  8/5/2023: Creatinine 1.32.    *Shingles affecting the left side of the abdomen.  She was started on valacyclovir 1 g once daily-dose reduced due to decreased kidney function.  There was some worsening of the rash on 8/3/2023.  Valacyclovir 1 g daily was continued.  Lidoderm patches were added to help with the pain.    *Vaginal yeast infection  Patient had a recent UTI and was treated with levofloxacin.  Patient developed vaginal yeast infection following the antibiotic therapy and was given 1 dose of Diflucan last week.    Dose #2 was given on 8/3/2023.    *Positive Hepatitis B core Ab.  No prior history of hepatitis.   GI service was consulted.  Work-up was initiated with hepatitis B viral load.  Ultrasound liver was requested.     PLAN:     1.  Day #2 of IVIG today.  2.  Continue Solu-Medrol daily.  We will reduce the dose to 40 mg starting tomorrow.  3.  Continue valacyclovir.     Discussed with the son at the bedside.      Tomer Jeong MD  08/05/23

## 2023-08-05 NOTE — THERAPY EVALUATION
Inpatient Rehabilitation - Speech Language Pathology   Swallow Initial Evaluation [facility]     Patient Name: Helena Carmen  : 1931  MRN: [id number]  Today's Date: 2023               Admit Date: 2023    Visit Dx:     ICD-10-CM ICD-9-CM   1. Uncontrolled hypertension  I10 401.9   2. Thrombocytopenia  D69.6 287.5   3. Hyponatremia  E87.1 276.1   4. History of ITP  Z86.2 V12.3   5. Rash (left flank)  R21 782.1     Patient Active Problem List   Diagnosis    Aortic valve stenosis    Fatigue    MI (mitral incompetence)    PVC (premature ventricular contraction)    Legally blind    Essential hypertension    Hypertriglyceridemia    Pulmonary hypertension    Paroxysmal atrial fibrillation    Anxiety    Stage 4 chronic kidney disease    Chronic pain disorder    Degeneration of lumbar intervertebral disc    Type 2 diabetes mellitus with hyperglycemia    Esophageal reflux    Gastroparesis    Hiatal hernia    Iatrogenic hypothyroidism    Macular degeneration    Malignant neoplasm of uterus    Osteoarthritis of knee    Peripheral nerve disease    Secondary hyperparathyroidism    Thrombocytopenia    Chronic heart failure with preserved ejection fraction    Other cirrhosis of liver    Hypothyroidism    Nonrheumatic tricuspid valve regurgitation    Anemia, chronic disease    Hyponatremia    Closed fracture of fifth lumbar vertebra    Closed fracture of fifth lumbar vertebra, unspecified fracture morphology, initial encounter    Diabetic polyneuropathy associated with type 2 diabetes mellitus    Chronic ITP (idiopathic thrombocytopenia)    Chronic midline low back pain with bilateral sciatica    Acute deep vein thrombosis of calf, bilateral    Compression fracture of L5 vertebra with routine healing    Acute left-sided low back pain with left-sided sciatica    Urine retention    Rib fracture    Acute on chronic combined systolic and diastolic congestive heart failure    Acute UTI    Blindness right eye category  3, blindness left eye category 3    Cognitive communication deficit    Pseudomonas (aeruginosa) (mallei) (pseudomallei) as the cause of diseases classified elsewhere    Multifocal pneumonia     Past Medical History:   Diagnosis Date    Aortic valve stenosis     s/p tissue AVR    Back pain     CKD (chronic kidney disease)     Colitis due to Clostridioides difficile 01/26/2022    Diastolic dysfunction     Grade 2 per echocardiogram 2013    Diverticulosis     Exertional shortness of breath     Heart disease     Hiatal hernia     Hyperlipidemia     Hypertension     Hyperthyroidism     Hypertriglyceridemia 05/31/2018    Hypothyroidism     L5 compression fracture (HCC) 08/2022    Left ventricular hypertrophy     Legally blind     Liver disease     Low back pain     Macular degeneration     Mitral regurgitation     Osteoarthritis of hip     Osteoporosis     Pancreatitis 01/26/2022    Paroxysmal atrial fibrillation     Peripheral neuropathy     Premature ventricular contractions     Pulmonary hypertension     Renal insufficiency syndrome     Type 2 diabetes mellitus     Uterine cancer      Past Surgical History:   Procedure Laterality Date    AORTIC VALVE REPAIR/REPLACEMENT      APPENDECTOMY      CATARACT EXTRACTION      1970, 1999    CHOLECYSTECTOMY      COLONOSCOPY      ENDOSCOPY  08/15/2014    no gross lesions in stomach/duodenum, erythrematous mucosa in stomach    EPIDURAL BLOCK      HYSTERECTOMY  2007    STERNOTOMY      UPPER GASTROINTESTINAL ENDOSCOPY         SLP Recommendation and Plan  SLP Swallowing Diagnosis: suspected esophageal dysphagia (08/05/23 1300)  SLP Diet Recommendation: soft to chew textures, ground, thin liquids (avoid dry foods) (08/05/23 1300)  Recommended Precautions and Strategies: upright posture during/after eating, small bites of food and sips of liquid, general aspiration precautions, reflux precautions, alternate between small bites of food and sips of liquid (08/05/23 1300)  SLP Rec. for  Method of Medication Administration: as tolerated (08/05/23 1300)     Monitor for Signs of Aspiration: yes, notify SLP if any concerns (08/05/23 1300)  Recommended Diagnostics:  (f/u for DT) (08/05/23 1300)  Swallow Criteria for Skilled Therapeutic Interventions Met: demonstrates skilled criteria (08/05/23 1300)  Anticipated Discharge Disposition (SLP): unknown (08/05/23 1300)  Rehab Potential/Prognosis, Swallowing: good, to achieve stated therapy goals (08/05/23 1300)  Therapy Frequency (Swallow): PRN (08/05/23 1300)  Predicted Duration Therapy Intervention (Days): until discharge (08/05/23 1300)  Oral Care Recommendations: Oral Care before breakfast, after meals and PRN, Oral Care BID/PRN (08/05/23 1300)                                      Oral Care Recommendations: Oral Care before breakfast, after meals and PRN, Oral Care BID/PRN (08/05/23 1300)           SWALLOW EVALUATION (last 72 hours)       SLP Adult Swallow Evaluation       Row Name 08/05/23 1300                   Rehab Evaluation    Document Type therapy note (daily note)  -TH        Subjective Information no complaints  -TH        Patient Observations cooperative;alert;agree to therapy  -TH        Patient/Family/Caregiver Comments/Observations son and granddaughter present  -TH        Patient Effort good  -TH           General Information    Patient Profile Reviewed yes  -TH        Pertinent History Of Current Problem 89-year-old white female with very complex past medical history and well-known to our service including ITP chronic low back pain with L5 vertebral body fracture degenerative disease paroxysmal atrial fibrillation DVT diabetes hypertension hyperlipidemia hypothyroidism congestive heart failure and chronic kidney disease stage III who lives at home with her daughter brought to the emergency room with generalized weakness and bruises all over. Patient work-up in ER revealed worsening thrombocytopenia admitted for management.  -TH         "Current Method of Nutrition NPO  -TH        Precautions/Limitations, Vision WFL;for purposes of eval  -TH        Precautions/Limitations, Hearing WFL;for purposes of eval  -TH        Prior Level of Function-Communication WFL  -TH        Prior Level of Function-Swallowing no diet consistency restrictions;esophageal concerns  avoids dry foods  -TH        Plans/Goals Discussed with patient and family;agreed upon  -TH        Barriers to Rehab none identified  -TH           Oral Musculature and Cranial Nerve Assessment    Oral Motor General Assessment lingual impairment  -TH        Lingual Impairment, Detail. Cranial Nerves IX, XII (Glossopharyngeal and Hypoglossal) --  patient reports of decreased sensation  -TH           General Eating/Swallowing Observations    Respiratory Support Currently in Use room air  -TH        Eating/Swallowing Skills self-fed;fed by SLP  -TH        Positioning During Eating upright in bed  -TH        Utensils Used spoon;cup;straw  -TH        Consistencies Trialed regular textures;soft to chew textures;mixed consistency;pureed;ice chips;thin liquids  -TH           Clinical Swallow Eval    Clinical Swallow Evaluation Summary Patient seen for bedside swallow evaluation on this date. Patient and son were present upon SLP arrival and her granddaughter arrived later in the session. Mrs. Carmen reports that she has issues with "food coming back up and getting sick when she eats/drinks"; she also says "it feels like it gets stuck right here" pointing to her stomach "then comes back up." Patient reports she does avoid dry foods. Her granddaughter reports she has polyps and a goiter, but do not see any reports of this. Patient has orders for an esophagram, however this has not been completed yet. RN reports this will take place on Monday. She is currently not on a diet. Completed trials of ice chips, thin liquids, pureed, soft solids/mixed, and solids. She tolerated ice chips without any difficulty; " she also tolerated thin liquids via tsp without any problems however, when taking large sips via cup she presented with coughing/throat clearing. When attempting small sips via straw and cup this appeared to eliminate s/sx of aspiration. She tolerated soft solids/mixed and pureed without any s/sx of aspiration. Adequate mastication and timely swallow observed. Clear vocal quality noted throughout session. No reports of globus sensation and she did not get sick or report of nausea during session. At this time recommend patient is on a soft/slick diet with thin liquids with upright position for all PO, slow rate, small bites/sips, alternating bites/sips and medications whole as tolerated. Recommend SLP follow up for diet tolerance.  -TH           SLP Evaluation Clinical Impression    SLP Swallowing Diagnosis suspected esophageal dysphagia  -TH        Functional Impact risk of aspiration/pneumonia  -TH        Rehab Potential/Prognosis, Swallowing good, to achieve stated therapy goals  -TH        Swallow Criteria for Skilled Therapeutic Interventions Met demonstrates skilled criteria  -TH           SLP Treatment Clinical Impressions    Care Plan Review evaluation/treatment results reviewed;care plan/treatment goals reviewed  -TH        Care Plan Review, Other Participant(s) son  -TH           Recommendations    Therapy Frequency (Swallow) PRN  -TH        Predicted Duration Therapy Intervention (Days) until discharge  -TH        SLP Diet Recommendation soft to chew textures;ground;thin liquids  avoid dry foods  -TH        Recommended Diagnostics --  f/u for DT  -TH        Recommended Precautions and Strategies upright posture during/after eating;small bites of food and sips of liquid;general aspiration precautions;reflux precautions;alternate between small bites of food and sips of liquid  -TH        Oral Care Recommendations Oral Care before breakfast, after meals and PRN;Oral Care BID/PRN  -TH        SLP Rec. for  Method of Medication Administration as tolerated  -TH        Monitor for Signs of Aspiration yes;notify SLP if any concerns  -TH        Anticipated Discharge Disposition (SLP) unknown  -TH           Swallow Goals (SLP)    Swallow LTGs Patient will demonstrate functional swallow for  -TH        Swallow STGs diet tolerance goal selection (SLP)  -TH        Diet Tolerance Goal Selection (SLP) Other (see comments);Swallow Short Term Goal 1  -TH           (LTG) Patient will demonstrate functional swallow for    Diet Texture (Demonstrate functional swallow) regular textures  -TH        Liquid viscosity (Demonstrate functional swallow) thin liquids  -TH        [place] (Demonstrate functional swallow) independently (over 90% accuracy)  -TH        Time Frame (Demonstrate functional swallow) by discharge  -TH           (STG) Swallow 1    (STG) Swallow 1 Patient will demonstrate use of esophogeal precautions/strategies independently to reduce s/sx .  -TH        [place] (Swallow Short Term Goal 1) independently (over 90% accuracy)  -TH        Time Frame (Swallow Short Term Goal 1) by discharge  -TH                  User Key  (r) = Recorded By, (t) = Taken By, (c) = Cosigned By      Initials Name Effective Dates    TH Madelin Maldonado 06/16/21 -                     EDUCATION  The patient has been educated in the following areas:   Dysphagia (Swallowing Impairment).        SLP GOALS       Row Name 08/05/23 1300             (LTG) Patient will demonstrate functional swallow for    Diet Texture (Demonstrate functional swallow) regular textures  -TH      Liquid viscosity (Demonstrate functional swallow) thin liquids  -TH      [place] (Demonstrate functional swallow) independently (over 90% accuracy)  -TH      Time Frame (Demonstrate functional swallow) by discharge  -TH         (STG) Swallow 1    (STG) Swallow 1 Patient will demonstrate use of esophogeal precautions/strategies independently to reduce s/sx .  -TH       [place] (Swallow Short Term Goal 1) independently (over 90% accuracy)  -TH      Time Frame (Swallow Short Term Goal 1) by discharge  -TH                User Key  (r) = Recorded By, (t) = Taken By, (c) = Cosigned By      Initials Name Provider Type    Madelin Ramos Speech and Language Pathologist                       Time Calculation:    Time Calculation- SLP       Row Name 08/05/23 1422             Time Calculation- SLP    SLP Start Time 1430  -TH      SLP Received On 08/05/23  -TH                User Key  (r) = Recorded By, (t) = Taken By, (c) = Cosigned By      Initials Name Provider Type    Madelin Ramos Speech and Language Pathologist                    Therapy Charges for Today       Code Description Service Date Service Provider Modifiers Qty    [id number] HC ST EVAL ORAL PHARYNG SWALLOW 5 8/5/2023 Madelin Maldonado GN 1                 Madelin Maldonado  8/5/2023

## 2023-08-05 NOTE — PROGRESS NOTES
"Daily progress note    Primary care physician      Subjective  Awake and alert and feeling same with no new complaint and wants her swallow study done while she is here.  Patient family at bedside with a lot of questions.    History of present illness  89-year-old white female with very complex past medical history and well-known to our service including ITP chronic low back pain with L5 vertebral body fracture degenerative disease paroxysmal atrial fibrillation DVT diabetes hypertension hyperlipidemia hypothyroidism congestive heart failure and chronic kidney disease stage III who lives at home with her daughter brought to the emergency room with generalized weakness and bruises all over.  Patient work-up in ER revealed worsening thrombocytopenia admitted for management.  Patient denies any fever chills chest pain shortness of breath abdominal pain nausea vomiting diarrhea.  Patient is DNR per her wishes.     REVIEW OF SYSTEMS  Unremarkable      PHYSICAL EXAM  Blood pressure 138/67, pulse 58, temperature 97.8 øF (36.6 øC), temperature source Oral, resp. rate 15, height 144.8 cm (57.01\"), weight 77.9 kg (171 lb 11.8 oz), SpO2 97 %.    Constitutional:       General: She is not in acute distress.     Appearance: She is well-developed.   HENT:      Head: Normocephalic and atraumatic.   Eyes:      Extraocular Movements: Extraocular movements intact.   Cardiovascular:      Rate and Rhythm: Normal rate and regular rhythm.      Heart sounds: Normal heart sounds.   Pulmonary:      Effort: Pulmonary effort is normal.      Breath sounds: Normal breath sounds.   Abdominal:      General: There is no distension.      Comments: Erythematous macular clusters noted along the left flank   Genitourinary:     Comments: Indwelling Hoffman catheter  Skin:     General: Skin is warm.   Neurological:      General: No focal deficit present.      Mental Status: She is alert and oriented to person, place, and time.   Psychiatric:    "      Mood and Affect: Mood normal.      LAB RESULTS  Lab Results (last 24 hours)       Procedure Component Value Units Date/Time    POC Glucose Once [[id number]]  (Abnormal) Collected: 08/05/23 1144    Specimen: Blood Updated: 08/05/23 1145     Glucose 154 mg/dL      Comment: Meter: [id number] : [id number] [facility]       Comprehensive Metabolic Panel [[id number]]  (Abnormal) Collected: 08/05/23 0701    Specimen: Blood Updated: 08/05/23 0825     Glucose 142 mg/dL      BUN 41 mg/dL      Creatinine 1.32 mg/dL      Sodium 133 mmol/L      Potassium 4.6 mmol/L      Chloride 101 mmol/L      CO2 22.9 mmol/L      Calcium 8.4 mg/dL      Total Protein 6.7 g/dL      Albumin 3.3 g/dL      ALT (SGPT) 13 U/L      AST (SGOT) 16 U/L      Alkaline Phosphatase 28 U/L      Total Bilirubin 0.4 mg/dL      Globulin 3.4 gm/dL      A/G Ratio 1.0 g/dL      BUN/Creatinine Ratio 31.1     Anion Gap 9.1 mmol/L      eGFR 38.0 mL/min/1.73     Narrative:      GFR Normal >60  Chronic Kidney Disease <60  Kidney Failure <15    The GFR formula is only valid for adults with stable renal function between ages 18 and 70.    POC Glucose Once [[id number]]  (Abnormal) Collected: 08/05/23 0822    Specimen: Blood Updated: 08/05/23 0823     Glucose 145 mg/dL      Comment: Meter: [id number] : [id number] [facility]       Hepatitis A Antibody, Total [[id number]]  (Abnormal) Collected: 08/04/23 1756    Specimen: Blood Updated: 08/05/23 0818     Hep A Total Ab Positive    Narrative:      Performed at:  [address] [place] [address]  [id number]  : Toño Flores PhD, Phone:  [phone]    CBC & Differential [[id number]]  (Abnormal) Collected: 08/05/23 0701    Specimen: Blood Updated: 08/05/23 0815    Narrative:      The following orders were created for panel order CBC & Differential.  Procedure                               Abnormality         Status                     ---------                                -----------         ------                     CBC Auto Differential[[id number]]        Abnormal            Final result                 Please view results for these tests on the individual orders.    CBC Auto Differential [[id number]]  (Abnormal) Collected: 08/05/23 0701    Specimen: Blood Updated: 08/05/23 0815     WBC 6.97 10*3/mm3      RBC 3.96 10*6/mm3      Hemoglobin 10.9 g/dL      Hematocrit 33.5 %      MCV 84.6 fL      MCH 27.5 pg      MCHC 32.5 g/dL      RDW 16.4 %      RDW-SD 50.8 fl      MPV 11.2 fL      Platelets 57 10*3/mm3      Neutrophil % 80.0 %      Lymphocyte % 10.0 %      Monocyte % 8.5 %      Eosinophil % 0.0 %      Basophil % 0.1 %      Neutrophils, Absolute 5.57 10*3/mm3      Lymphocytes, Absolute 0.70 10*3/mm3      Monocytes, Absolute 0.59 10*3/mm3      Eosinophils, Absolute 0.00 10*3/mm3      Basophils, Absolute 0.01 10*3/mm3     Hepatitis B E Antigen [[id number]] Collected: 08/04/23 1756    Specimen: Blood Updated: 08/05/23 0712     Hep B E Ag Negative    Narrative:      Performed at:  [address]  [id number]  : Toño Flores PhD, Phone:  [phone]    Methylmalonic Acid, Serum [[id number]]  (Abnormal) Collected: 08/01/23 0934    Specimen: Blood Updated: 08/04/23 2207     Methylmalonic Acid 563 nmol/L     Narrative:      Test(s) 983677-Ugqdacbsydprr Acid, Serum  was developed and its performance characteristics determined  by [employer]. It has not been cleared or approved by the Food  and Drug Administration.  Performed at:  [address]  [id number]  : Gonzales Kang MD, Phone:  [phone]    POC Glucose Once [[id number]]  (Abnormal) Collected: 08/04/23 2045    Specimen: Blood Updated: 08/04/23 2047     Glucose 276 mg/dL      Comment: Meter: [id number] : [id number] [facility] TyKenyana NA       Hepatitis A Antibody, IgM [[id number]]  (Normal) Collected: 08/04/23 2340    Specimen: Blood  Updated: 08/04/23 1921     Hep A IgM Non-Reactive    Narrative:      Results may be falsely decreased if patient taking Biotin.     Hepatitis C Antibody [[id number]]  (Normal) Collected: 08/04/23 1756    Specimen: Blood Updated: 08/04/23 1921     Hepatitis C Ab Non-Reactive    Narrative:      Results may be falsely decreased if patient taking Biotin.      Hepatitis B Core Antibody, IgM [[id number]]  (Normal) Collected: 08/04/23 1756    Specimen: Blood Updated: 08/04/23 1921     Hep B C IgM Non-Reactive    Narrative:      Results may be falsely decreased if patient taking Biotin.      Hepatitis B E Antibody [[id number]] Collected: 08/04/23 1756    Specimen: Blood Updated: 08/04/23 1818    HBV Quant PCR Rfx to Genotype [[id number]] Collected: 08/04/23 1756    Specimen: Blood Updated: 08/04/23 1818    POC Glucose Once [[id number]]  (Abnormal) Collected: 08/04/23 1638    Specimen: Blood Updated: 08/04/23 1639     Glucose 217 mg/dL      Comment: Meter: [id number] : [id number] Sarwat JOHNSON             Imaging Results (Last 24 Hours)       Procedure Component Value Units Date/Time    US Liver [[id number]] Collected: 08/05/23 0605     Updated: 08/05/23 0612    Narrative:      RIGHT UPPER QUADRANT ULTRASOUND     HISTORY: Chronic liver disease, hepatitis B     COMPARISON: CT of the abdomen and pelvis 05/01/2023     TECHNIQUE: Real time ultrasound imaging of the right upper quadrant was  performed. Static images reviewed.     FINDINGS: Visualized portions of the pancreas are unremarkable. The  liver is normal in echogenicity. No focal liver lesion. Previous  cholecystectomy. Common duct measures about 5 mm. The right kidney  measures 11.4 cm in length and unremarkable. No ascites.       Impression:      Previous cholecystectomy, otherwise unremarkable     This report was finalized on 8/5/2023 6:08 AM by Dr. Oziel Darling M.D.               Current Facility-Administered Medications:     acetaminophen (TYLENOL) tablet 650  mg, 650 mg, Oral, Q24H, Tomer Jeong MD, 650 mg at 08/04/23 1555    budesonide-formoterol (SYMBICORT) 160-4.5 MCG/ACT inhaler 2 puff, 2 puff, Inhalation, BID, Fred Jeffrey MD, 2 puff at 08/05/23 0818    carvedilol (COREG) tablet 3.125 mg, 3.125 mg, Oral, BID With Meals, Mango Arnold MD, 3.125 mg at 08/05/23 0907    cholecalciferol (VITAMIN D3) tablet 1,000 Units, 1,000 Units, Oral, Daily, Fred Jeffrey MD, 1,000 Units at 08/05/23 0907    cyanocobalamin injection 1,000 mcg, 1,000 mcg, Intramuscular, Daily, Tomer Jeong MD, 1,000 mcg at 08/05/23 0907    diphenhydrAMINE (BENADRYL) capsule 25 mg, 25 mg, Oral, Q24H, Tomer Jeong MD, 25 mg at 08/04/23 1556    diphenhydrAMINE (BENADRYL) injection 50 mg, 50 mg, Intravenous, Once PRN, Tomer Jeong MD    famotidine (PEPCID) injection 20 mg, 20 mg, Intravenous, Once PRN, Tomer Jeong MD    gabapentin (NEURONTIN) capsule 600 mg, 600 mg, Oral, Q12H, Fred Jeffrey MD, 600 mg at 08/05/23 0906    guaiFENesin (MUCINEX) 12 hr tablet 600 mg, 600 mg, Oral, Q12H, Mango Arnold MD, 600 mg at 08/05/23 0908    hydrALAZINE (APRESOLINE) tablet 100 mg, 100 mg, Oral, TID, Mango Arnold MD, 100 mg at 08/05/23 0908    HYDROcodone-acetaminophen (NORCO) 7.5-325 MG per tablet 1 tablet, 1 tablet, Oral, Q4H PRN, Mango Arnold MD, 1 tablet at 08/05/23 1105    Hydrocortisone Sod Suc (PF) (Solu-CORTEF) injection 100 mg, 100 mg, Intravenous, Once PRN, Tomer Jeong MD    immune globulin (human) (GAMUNEX-C) 5 g infusion 50 mL, 5 g, Intravenous, Q24H, Stopped at 08/04/23 1809 **AND** immune globulin (human) (GAMUNEX-C) 20 g infusion 200 mL, 20 g, Intravenous, Q24H, Last Rate: 88 mL/hr at 08/04/23 1843, Currently Infusing at 08/04/23 1843 **AND** immune globulin (human) (GAMUNEX-C) 20 g infusion 200 mL, 20 g, Intravenous, Q24H, Tomer Jeong MD, 20 g at 08/04/23 2241    insulin glargine (LANTUS, SEMGLEE) injection 36 Units, 36 Units, Subcutaneous,  Nightly, Mango Arnold MD, 36 Units at 08/04/23 2107    insulin lispro (HUMALOG/ADMELOG) injection 12 Units, 12 Units, Subcutaneous, TID With Meals, Mango Arnold MD, 12 Units at 08/04/23 1718    insulin lispro (HUMALOG/ADMELOG) injection 2-7 Units, 2-7 Units, Subcutaneous, 4x Daily AC & at Bedtime, Mango Arnold MD, 4 Units at 08/04/23 2107    levothyroxine (SYNTHROID, LEVOTHROID) tablet 25 mcg, 25 mcg, Oral, QAM, Fred Jeffrey MD, 25 mcg at 08/05/23 0908    lidocaine (LIDODERM) 5 % 2 patch, 2 patch, Transdermal, Q24H, Tomer Jeong MD, 2 patch at 08/05/23 0907    LORazepam (ATIVAN) tablet 1 mg, 1 mg, Oral, Q PM, Fred Jeffrey MD, 1 mg at 08/04/23 2107    [START ON 8/6/2023] methylPREDNISolone sodium succinate (SOLU-Medrol) injection 40 mg, 40 mg, Intravenous, Daily, Tomer Jeong MD    montelukast (SINGULAIR) tablet 10 mg, 10 mg, Oral, Nightly, Fred Jeffrey MD, 10 mg at 08/04/23 2107    ondansetron (ZOFRAN) injection 4 mg, 4 mg, Intravenous, Q6H PRN, Fred Jeffrey MD, 4 mg at 08/01/23 2044    pantoprazole (PROTONIX) EC tablet 40 mg, 40 mg, Oral, Q AM, Fred Jeffrey MD, 40 mg at 08/04/23 0546    sennosides-docusate (PERICOLACE) 8.6-50 MG per tablet 2 tablet, 2 tablet, Oral, BID, Mango Arnold MD, 2 tablet at 08/05/23 0906    tamsulosin (FLOMAX) 24 hr capsule 0.4 mg, 0.4 mg, Oral, Daily, Fred Jeffrey MD, 0.4 mg at 08/05/23 0908    valACYclovir (VALTREX) tablet 1,000 mg, 1,000 mg, Oral, Q24H, Tomer Jeong MD, 1,000 mg at 08/05/23 0908     ASSESSMENT  Chronic ITP   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  Dysphagia  Chronic anemia  Congestive heart failure  Diabetes mellitus  Hypertension  Hypothyroidism  Paroxysmal atrial fibrillation  Pulmonary hypertension  Aortic stenosis  Degenerative disc disease  Compression fracture L5 vertebra  Chronic kidney disease stage IV  Chronic kidney retention with Hoffman catheter    PLAN  CPM  Continue IV steroids and  start Rituxan   Swallow eval  GI and hematology to follow patient  Adjust home medications  Stress ulcer DVT prophylaxis  Supportive care  PT OT  DNR  Discussed with family nursing staff and hematology  Follow closely and further recommendation current hospital course    KADEEM RIVAS MD    Copied text in this note has been reviewed and is accurate as of 08/05/23

## 2023-08-06 LAB
ALBUMIN SERPL-MCNC: 2.6 G/DL (ref 3.5–5.2)
ALBUMIN/GLOB SERPL: 0.6 G/DL
ALP SERPL-CCNC: 31 U/L (ref 39–117)
ALT SERPL W P-5'-P-CCNC: 21 U/L (ref 1–33)
ANION GAP SERPL CALCULATED.3IONS-SCNC: 8.2 MMOL/L (ref 5–15)
AST SERPL-CCNC: 17 U/L (ref 1–32)
BASOPHILS # BLD AUTO: 0.01 10*3/MM3 (ref 0–0.2)
BASOPHILS NFR BLD AUTO: 0.2 % (ref 0–1.5)
BILIRUB SERPL-MCNC: 0.5 MG/DL (ref 0–1.2)
BUN SERPL-MCNC: 43 MG/DL (ref 8–23)
BUN/CREAT SERPL: 30.5 (ref 7–25)
CALCIUM SPEC-SCNC: 8.2 MG/DL (ref 8.2–9.6)
CHLORIDE SERPL-SCNC: 101 MMOL/L (ref 98–107)
CO2 SERPL-SCNC: 20.8 MMOL/L (ref 22–29)
CREAT SERPL-MCNC: 1.41 MG/DL (ref 0.57–1)
CRP SERPL-MCNC: <0.3 MG/DL (ref 0–0.5)
DEPRECATED RDW RBC AUTO: 51.2 FL (ref 37–54)
EGFRCR SERPLBLD CKD-EPI 2021: 35.1 ML/MIN/1.73
EOSINOPHIL # BLD AUTO: 0 10*3/MM3 (ref 0–0.4)
EOSINOPHIL NFR BLD AUTO: 0 % (ref 0.3–6.2)
ERYTHROCYTE [DISTWIDTH] IN BLOOD BY AUTOMATED COUNT: 16.8 % (ref 12.3–15.4)
GLOBULIN UR ELPH-MCNC: 4.6 GM/DL
GLUCOSE BLDC GLUCOMTR-MCNC: 155 MG/DL (ref 70–130)
GLUCOSE BLDC GLUCOMTR-MCNC: 206 MG/DL (ref 70–130)
GLUCOSE BLDC GLUCOMTR-MCNC: 240 MG/DL (ref 70–130)
GLUCOSE BLDC GLUCOMTR-MCNC: 240 MG/DL (ref 70–130)
GLUCOSE SERPL-MCNC: 267 MG/DL (ref 65–99)
HBV DNA SERPL NAA+PROBE-ACNC: NORMAL IU/ML
HBV DNA SERPL NAA+PROBE-LOG IU: NORMAL LOG10 IU/ML
HBV GENTYP SERPL NAA+PROBE: NORMAL
HCT VFR BLD AUTO: 29.8 % (ref 34–46.6)
HGB BLD-MCNC: 9.6 G/DL (ref 12–15.9)
IMM GRANULOCYTES # BLD AUTO: 0.17 10*3/MM3 (ref 0–0.05)
IMM GRANULOCYTES NFR BLD AUTO: 2.7 % (ref 0–0.5)
LYMPHOCYTES # BLD AUTO: 0.87 10*3/MM3 (ref 0.7–3.1)
LYMPHOCYTES NFR BLD AUTO: 13.6 % (ref 19.6–45.3)
MCH RBC QN AUTO: 27.4 PG (ref 26.6–33)
MCHC RBC AUTO-ENTMCNC: 32.2 G/DL (ref 31.5–35.7)
MCV RBC AUTO: 85.1 FL (ref 79–97)
MONOCYTES # BLD AUTO: 0.58 10*3/MM3 (ref 0.1–0.9)
MONOCYTES NFR BLD AUTO: 9.1 % (ref 5–12)
NEUTROPHILS NFR BLD AUTO: 4.76 10*3/MM3 (ref 1.7–7)
NEUTROPHILS NFR BLD AUTO: 74.4 % (ref 42.7–76)
NRBC BLD AUTO-RTO: 0.6 /100 WBC (ref 0–0.2)
PLATELET # BLD AUTO: 65 10*3/MM3 (ref 140–450)
PMV BLD AUTO: 10.2 FL (ref 6–12)
POTASSIUM SERPL-SCNC: 4.6 MMOL/L (ref 3.5–5.2)
PROT SERPL-MCNC: 7.2 G/DL (ref 6–8.5)
RBC # BLD AUTO: 3.5 10*6/MM3 (ref 3.77–5.28)
REF LAB TEST REF RANGE: NORMAL
SODIUM SERPL-SCNC: 130 MMOL/L (ref 136–145)
WBC NRBC COR # BLD: 6.39 10*3/MM3 (ref 3.4–10.8)

## 2023-08-06 PROCEDURE — 80053 COMPREHEN METABOLIC PANEL: CPT | Performed by: HOSPITALIST

## 2023-08-06 PROCEDURE — 85025 COMPLETE CBC W/AUTO DIFF WBC: CPT | Performed by: INTERNAL MEDICINE

## 2023-08-06 PROCEDURE — 63710000001 INSULIN GLARGINE PER 5 UNITS: Performed by: HOSPITALIST

## 2023-08-06 PROCEDURE — 25010000002 CYANOCOBALAMIN PER 1000 MCG: Performed by: INTERNAL MEDICINE

## 2023-08-06 PROCEDURE — 25010000002 METHYLPREDNISOLONE PER 40 MG: Performed by: INTERNAL MEDICINE

## 2023-08-06 PROCEDURE — 86140 C-REACTIVE PROTEIN: CPT | Performed by: HOSPITALIST

## 2023-08-06 PROCEDURE — 94664 DEMO&/EVAL PT USE INHALER: CPT

## 2023-08-06 PROCEDURE — 63710000001 INSULIN LISPRO (HUMAN) PER 5 UNITS: Performed by: HOSPITALIST

## 2023-08-06 PROCEDURE — 94799 UNLISTED PULMONARY SVC/PX: CPT

## 2023-08-06 PROCEDURE — 99232 SBSQ HOSP IP/OBS MODERATE 35: CPT | Performed by: INTERNAL MEDICINE

## 2023-08-06 PROCEDURE — 82948 REAGENT STRIP/BLOOD GLUCOSE: CPT

## 2023-08-06 RX ADMIN — GABAPENTIN 600 MG: 300 CAPSULE ORAL at 09:28

## 2023-08-06 RX ADMIN — INSULIN GLARGINE 36 UNITS: 100 INJECTION, SOLUTION SUBCUTANEOUS at 22:01

## 2023-08-06 RX ADMIN — HYDRALAZINE HYDROCHLORIDE 100 MG: 50 TABLET, FILM COATED ORAL at 09:28

## 2023-08-06 RX ADMIN — CARVEDILOL 3.12 MG: 3.12 TABLET, FILM COATED ORAL at 09:28

## 2023-08-06 RX ADMIN — LORAZEPAM 1 MG: 1 TABLET ORAL at 20:11

## 2023-08-06 RX ADMIN — HYDROCODONE BITARTRATE AND ACETAMINOPHEN 1 TABLET: 7.5; 325 TABLET ORAL at 06:39

## 2023-08-06 RX ADMIN — GUAIFENESIN 600 MG: 600 TABLET, EXTENDED RELEASE ORAL at 20:11

## 2023-08-06 RX ADMIN — INSULIN LISPRO 3 UNITS: 100 INJECTION, SOLUTION INTRAVENOUS; SUBCUTANEOUS at 09:27

## 2023-08-06 RX ADMIN — INSULIN LISPRO 12 UNITS: 100 INJECTION, SOLUTION INTRAVENOUS; SUBCUTANEOUS at 11:59

## 2023-08-06 RX ADMIN — HYDROCODONE BITARTRATE AND ACETAMINOPHEN 1 TABLET: 7.5; 325 TABLET ORAL at 16:13

## 2023-08-06 RX ADMIN — INSULIN LISPRO 12 UNITS: 100 INJECTION, SOLUTION INTRAVENOUS; SUBCUTANEOUS at 17:47

## 2023-08-06 RX ADMIN — INSULIN LISPRO 3 UNITS: 100 INJECTION, SOLUTION INTRAVENOUS; SUBCUTANEOUS at 22:01

## 2023-08-06 RX ADMIN — VALACYCLOVIR HYDROCHLORIDE 1000 MG: 500 TABLET, FILM COATED ORAL at 09:28

## 2023-08-06 RX ADMIN — INSULIN LISPRO 2 UNITS: 100 INJECTION, SOLUTION INTRAVENOUS; SUBCUTANEOUS at 17:47

## 2023-08-06 RX ADMIN — CYANOCOBALAMIN 1000 MCG: 1000 INJECTION, SOLUTION INTRAMUSCULAR; SUBCUTANEOUS at 09:28

## 2023-08-06 RX ADMIN — GUAIFENESIN 600 MG: 600 TABLET, EXTENDED RELEASE ORAL at 09:28

## 2023-08-06 RX ADMIN — LEVOTHYROXINE SODIUM 25 MCG: 0.03 TABLET ORAL at 06:39

## 2023-08-06 RX ADMIN — BUDESONIDE AND FORMOTEROL FUMARATE DIHYDRATE 2 PUFF: 160; 4.5 AEROSOL RESPIRATORY (INHALATION) at 22:01

## 2023-08-06 RX ADMIN — MONTELUKAST SODIUM 10 MG: 10 TABLET, FILM COATED ORAL at 20:11

## 2023-08-06 RX ADMIN — HYDRALAZINE HYDROCHLORIDE 100 MG: 50 TABLET, FILM COATED ORAL at 20:11

## 2023-08-06 RX ADMIN — PANTOPRAZOLE SODIUM 40 MG: 40 TABLET, DELAYED RELEASE ORAL at 06:39

## 2023-08-06 RX ADMIN — HYDROCODONE BITARTRATE AND ACETAMINOPHEN 1 TABLET: 7.5; 325 TABLET ORAL at 11:59

## 2023-08-06 RX ADMIN — INSULIN LISPRO 3 UNITS: 100 INJECTION, SOLUTION INTRAVENOUS; SUBCUTANEOUS at 12:00

## 2023-08-06 RX ADMIN — TAMSULOSIN HYDROCHLORIDE 0.4 MG: 0.4 CAPSULE ORAL at 09:28

## 2023-08-06 RX ADMIN — Medication 1000 UNITS: at 09:28

## 2023-08-06 RX ADMIN — LIDOCAINE 2 PATCH: 700 PATCH TOPICAL at 09:28

## 2023-08-06 RX ADMIN — SENNOSIDES AND DOCUSATE SODIUM 2 TABLET: 50; 8.6 TABLET ORAL at 09:28

## 2023-08-06 RX ADMIN — HYDROCODONE BITARTRATE AND ACETAMINOPHEN 1 TABLET: 7.5; 325 TABLET ORAL at 20:10

## 2023-08-06 RX ADMIN — METHYLPREDNISOLONE SODIUM SUCCINATE 40 MG: 40 INJECTION, POWDER, FOR SOLUTION INTRAMUSCULAR; INTRAVENOUS at 09:27

## 2023-08-06 RX ADMIN — HYDROCODONE BITARTRATE AND ACETAMINOPHEN 1 TABLET: 7.5; 325 TABLET ORAL at 02:39

## 2023-08-06 RX ADMIN — INSULIN LISPRO 12 UNITS: 100 INJECTION, SOLUTION INTRAVENOUS; SUBCUTANEOUS at 09:27

## 2023-08-06 RX ADMIN — HYDRALAZINE HYDROCHLORIDE 100 MG: 50 TABLET, FILM COATED ORAL at 16:13

## 2023-08-06 RX ADMIN — CARVEDILOL 3.12 MG: 3.12 TABLET, FILM COATED ORAL at 17:46

## 2023-08-06 RX ADMIN — GABAPENTIN 600 MG: 300 CAPSULE ORAL at 20:11

## 2023-08-06 RX ADMIN — BUDESONIDE AND FORMOTEROL FUMARATE DIHYDRATE 2 PUFF: 160; 4.5 AEROSOL RESPIRATORY (INHALATION) at 08:52

## 2023-08-06 NOTE — PROGRESS NOTES
[facility]  Inpatient Progress Note    Reason for Follow Up: Positive hepatitis B core antibody    Subjective     Interval History:   No complaints, getting IVIG    Current Facility-Administered Medications:     budesonide-formoterol (SYMBICORT) 160-4.5 MCG/ACT inhaler 2 puff, 2 puff, Inhalation, BID, Fred Jeffrey MD, 2 puff at 08/06/23 0852    carvedilol (COREG) tablet 3.125 mg, 3.125 mg, Oral, BID With Meals, Mango Arnold MD, 3.125 mg at 08/06/23 0928    cholecalciferol (VITAMIN D3) tablet 1,000 Units, 1,000 Units, Oral, Daily, Fred Jeffrey MD, 1,000 Units at 08/06/23 0928    diphenhydrAMINE (BENADRYL) injection 50 mg, 50 mg, Intravenous, Once PRN, Tomer Jeong MD    gabapentin (NEURONTIN) capsule 600 mg, 600 mg, Oral, Q12H, Fred Jeffrey MD, 600 mg at 08/06/23 0928    guaiFENesin (MUCINEX) 12 hr tablet 600 mg, 600 mg, Oral, Q12H, Mango Arnold MD, 600 mg at 08/06/23 0928    hydrALAZINE (APRESOLINE) tablet 100 mg, 100 mg, Oral, TID, Mango Arnold MD, 100 mg at 08/06/23 1613    HYDROcodone-acetaminophen (NORCO) 7.5-325 MG per tablet 1 tablet, 1 tablet, Oral, Q4H PRN, Mango Arnold MD, 1 tablet at 08/06/23 1613    Hydrocortisone Sod Suc (PF) (Solu-CORTEF) injection 100 mg, 100 mg, Intravenous, Once PRN, Tomer Jeong MD    insulin glargine (LANTUS, SEMGLEE) injection 36 Units, 36 Units, Subcutaneous, Nightly, Mango Arnold MD, 36 Units at 08/05/23 2035    insulin lispro (HUMALOG/ADMELOG) injection 12 Units, 12 Units, Subcutaneous, TID With Meals, Mango Arnold MD, 12 Units at 08/06/23 1159    insulin lispro (HUMALOG/ADMELOG) injection 2-7 Units, 2-7 Units, Subcutaneous, 4x Daily AC & at Bedtime, Mango Arnold MD, 3 Units at 08/06/23 1200    levothyroxine (SYNTHROID, LEVOTHROID) tablet 25 mcg, 25 mcg, Oral, QAM, Fred Jeffrey MD, 25 mcg at 08/06/23 0639    lidocaine (LIDODERM) 5 % 2 patch, 2 patch, Transdermal, Q24H, Tomer Jeong MD, 2 patch at 08/06/23  0928    LORazepam (ATIVAN) tablet 1 mg, 1 mg, Oral, Q PM, Fred Jeffrey MD, 1 mg at 08/05/23 2035    methylPREDNISolone sodium succinate (SOLU-Medrol) injection 40 mg, 40 mg, Intravenous, Daily, Tomer Jeong MD, 40 mg at 08/06/23 0927    montelukast (SINGULAIR) tablet 10 mg, 10 mg, Oral, Nightly, Fred Jeffrey MD, 10 mg at 08/05/23 2035    ondansetron (ZOFRAN) injection 4 mg, 4 mg, Intravenous, Q6H PRN, Fred Jeffrey MD, 4 mg at 08/01/23 2044    pantoprazole (PROTONIX) EC tablet 40 mg, 40 mg, Oral, Q AM, Fred Jeffrey MD, 40 mg at 08/06/23 0639    sennosides-docusate (PERICOLACE) 8.6-50 MG per tablet 2 tablet, 2 tablet, Oral, BID, Mango Arnold MD, 2 tablet at 08/06/23 0928    tamsulosin (FLOMAX) 24 hr capsule 0.4 mg, 0.4 mg, Oral, Daily, Fred Jeffrey MD, 0.4 mg at 08/06/23 0928    valACYclovir (VALTREX) tablet 1,000 mg, 1,000 mg, Oral, Q24H, Tomer Jeong MD, 1,000 mg at 08/06/23 0928  Review of Systems:     The following systems were reviewed and negative: Constitution, pulmonary, cardiac, gastrointestinal, genitourinary      Objective     Vital Signs  Temp:  [97 øF (36.1 øC)-97.9 øF (36.6 øC)] 97 øF (36.1 øC)  Heart Rate:  [58-74] 63  Resp:  [16-17] 17  BP: (132-184)/(57-66) 156/66  Body mass index is 38.2 kg/mý.    Intake/Output Summary (Last 24 hours) at 8/6/2023 1625  Last data filed at 8/6/2023 0505  Gross per 24 hour   Intake --   Output 2350 ml   Net -2350 ml     No intake/output data recorded.     Physical Exam:   General: patient awake, alert and cooperative   Eyes: Normal lids and lashes, no scleral icterus   Neck: supple, normal ROM   Skin: warm and dry, not jaundiced   Cardiovascular: regular rhythm and rate, no murmurs auscultated   Pulm: clear to auscultation bilaterally, regular and unlabored   Abdomen: soft, obese, nontender, nondistended; normal bowel sounds   Rectal: deferred   Extremities: no rash or edema   Psychiatric: Normal mood and behavior; memory  intact     Results Review:     I reviewed the patient's new clinical results.    Results from last 7 days   Lab Units 08/06/23  0550 08/05/23  0701 08/04/23  0743   WBC 10*3/mm3 6.39 6.97 8.38   HEMOGLOBIN g/dL 9.6* 10.9* 10.9*   HEMATOCRIT % 29.8* 33.5* 33.1*   PLATELETS 10*3/mm3 65* 57* 48*     Results from last 7 days   Lab Units 08/06/23  0550 08/05/23  0701 08/04/23  0743   SODIUM mmol/L 130* 133* 128*   POTASSIUM mmol/L 4.6 4.6 4.2   CHLORIDE mmol/L 101 101 99   CO2 mmol/L 20.8* 22.9 20.0*   BUN mg/dL 43* 41* 37*   CREATININE mg/dL 1.41* 1.32* 1.44*   CALCIUM mg/dL 8.2 8.4 8.1*   BILIRUBIN mg/dL 0.5 0.4 0.3   ALK PHOS U/L 31* 28* 35*   ALT (SGPT) U/L 21 13 11   AST (SGOT) U/L 17 16 14   GLUCOSE mg/dL 267* 142* 275*     Results from last 7 days   Lab Units 07/31/23  1920   INR  1.15*     Lab Results   Lab Value Date/Time    LIPASE 21 06/12/2023 1056    LIPASE 37 03/07/2023 1153    LIPASE 26 08/24/2022 1453       Radiology:  US Liver   Final Result   Previous cholecystectomy, otherwise unremarkable       This report was finalized on 8/5/2023 6:08 AM by Dr. Oziel Darling M.D.          XR Spine Lumbar Complete 4+VW   Final Result   1. Compression deformity of L5 with approximately 35% loss of the   vertebral body height. This does not appear acute and appears unchanged   from the 06/15/2023 study.   2. No acute fractures are seen.       This report was finalized on 7/31/2023 7:43 PM by Dr. Herbie Pike M.D.          FL Esophagram Complete Single Contrast    (Results Pending)       Assessment & Plan     Active Hospital Problems    Diagnosis     **Thrombocytopenia        Impression  1.  Positive hepatitis B core antibody, SAg negative: She has had prior infection and clearance    2.  Severe thrombocytopenia with concerns for ITP    3.  CT imaging with chronic liver disease    4.  Stage IV CKD    Plan  Her lab testing indicates that she has had a prior hepatitis B infection and cleared this though  definitively will be confirmed by hepatitis B viral load (still pending but would be unusual with SAg neg status).  Regardless, she is at moderate risk of hepatitis B reactivation with rituximab therapy and will need to be treated with tenofovir or similar to prevent this from occurring if it is decided to use this therapy for her.    Message has been sent to my office for follow up.    GI will sign off but remain available as needed in this patient's care.  Thank you.      I discussed the patients findings and my recommendations with patient and family.    All necessary PPE, including face mask and eye protection, were worn during this encounter.  Hand sanitization was performed both before and after the patient interaction.    Over 25 minutes was spent reviewing the patient's chart and records, in discussion with the patient, in examination of the patient, and in discussion with members of the patient's medical team.    Ngoc Watt MD

## 2023-08-06 NOTE — PROGRESS NOTES
"Daily progress note    Primary care physician      Subjective  Doing same with no new complaints and tolerating diet but still wants the swallow study done in the morning.  Patient family at bedside.    History of present illness  89-year-old white female with very complex past medical history and well-known to our service including ITP chronic low back pain with L5 vertebral body fracture degenerative disease paroxysmal atrial fibrillation DVT diabetes hypertension hyperlipidemia hypothyroidism congestive heart failure and chronic kidney disease stage III who lives at home with her daughter brought to the emergency room with generalized weakness and bruises all over.  Patient work-up in ER revealed worsening thrombocytopenia admitted for management.  Patient denies any fever chills chest pain shortness of breath abdominal pain nausea vomiting diarrhea.  Patient is DNR per her wishes.     REVIEW OF SYSTEMS  Unremarkable      PHYSICAL EXAM  Blood pressure 170/63, pulse 66, temperature 97.3 øF (36.3 øC), temperature source Oral, resp. rate 17, height 144.8 cm (57.01\"), weight 80.1 kg (176 lb 9.4 oz), SpO2 96 %.    Constitutional:       General: She is not in acute distress.     Appearance: She is well-developed.   HENT:      Head: Normocephalic and atraumatic.   Eyes:      Extraocular Movements: Extraocular movements intact.   Cardiovascular:      Rate and Rhythm: Normal rate and regular rhythm.      Heart sounds: Normal heart sounds.   Pulmonary:      Effort: Pulmonary effort is normal.      Breath sounds: Normal breath sounds.   Abdominal:      General: There is no distension.      Comments: Erythematous macular clusters noted along the left flank   Genitourinary:     Comments: Indwelling Hoffman catheter  Skin:     General: Skin is warm.   Neurological:      General: No focal deficit present.      Mental Status: She is alert and oriented to person, place, and time.   Psychiatric:         Mood and Affect: " Mood normal.      LAB RESULTS  Lab Results (last 24 hours)       Procedure Component Value Units Date/Time    POC Glucose Once [[id number]]  (Abnormal) Collected: 08/06/23 1135    Specimen: Blood Updated: 08/06/23 1142     Glucose 240 mg/dL      Comment: Meter: [id number] : [id number] Lending Worksony CNA       POC Glucose Once [[id number]]  (Abnormal) Collected: 08/06/23 0759    Specimen: Blood Updated: 08/06/23 0808     Glucose 240 mg/dL      Comment: Meter: [id number] : [id number] Brown Debi CNA       CBC & Differential [[id number]]  (Abnormal) Collected: 08/06/23 0550    Specimen: Blood Updated: 08/06/23 0713    Narrative:      The following orders were created for panel order CBC & Differential.  Procedure                               Abnormality         Status                     ---------                               -----------         ------                     CBC Auto Differential[[id number]]        Abnormal            Final result                 Please view results for these tests on the individual orders.    CBC Auto Differential [[id number]]  (Abnormal) Collected: 08/06/23 0550    Specimen: Blood Updated: 08/06/23 0713     WBC 6.39 10*3/mm3      RBC 3.50 10*6/mm3      Hemoglobin 9.6 g/dL      Hematocrit 29.8 %      MCV 85.1 fL      MCH 27.4 pg      MCHC 32.2 g/dL      RDW 16.8 %      RDW-SD 51.2 fl      MPV 10.2 fL      Platelets 65 10*3/mm3      Neutrophil % 74.4 %      Lymphocyte % 13.6 %      Monocyte % 9.1 %      Eosinophil % 0.0 %      Basophil % 0.2 %      Immature Grans % 2.7 %      Neutrophils, Absolute 4.76 10*3/mm3      Lymphocytes, Absolute 0.87 10*3/mm3      Monocytes, Absolute 0.58 10*3/mm3      Eosinophils, Absolute 0.00 10*3/mm3      Basophils, Absolute 0.01 10*3/mm3      Immature Grans, Absolute 0.17 10*3/mm3      nRBC 0.6 /100 WBC     Comprehensive Metabolic Panel [[id number]]  (Abnormal) Collected: 08/06/23 0550    Specimen: Blood Updated: 08/06/23 0636     Glucose 267 mg/dL      BUN  43 mg/dL      Creatinine 1.41 mg/dL      Sodium 130 mmol/L      Potassium 4.6 mmol/L      Chloride 101 mmol/L      CO2 20.8 mmol/L      Calcium 8.2 mg/dL      Total Protein 7.2 g/dL      Albumin 2.6 g/dL      ALT (SGPT) 21 U/L      AST (SGOT) 17 U/L      Alkaline Phosphatase 31 U/L      Total Bilirubin 0.5 mg/dL      Globulin 4.6 gm/dL      A/G Ratio 0.6 g/dL      BUN/Creatinine Ratio 30.5     Anion Gap 8.2 mmol/L      eGFR 35.1 mL/min/1.73     Narrative:      GFR Normal >60  Chronic Kidney Disease <60  Kidney Failure <15    The GFR formula is only valid for adults with stable renal function between ages 18 and 70.    C-reactive Protein [[id number]]  (Normal) Collected: 08/06/23 0550    Specimen: Blood Updated: 08/06/23 0636     C-Reactive Protein <0.30 mg/dL     POC Glucose Once [[id number]]  (Abnormal) Collected: 08/05/23 2001    Specimen: Blood Updated: 08/05/23 2003     Glucose 462 mg/dL      Comment: RN Notified R and V Meter: [id number] : [id number] Mejias Amanda NA       POC Glucose Once [[id number]]  (Abnormal) Collected: 08/05/23 1612    Specimen: Blood Updated: 08/05/23 1613     Glucose 370 mg/dL      Comment: Meter: [id number] : [id number] Ruelas Tim NA             Imaging Results (Last 24 Hours)       ** No results found for the last 24 hours. **            Current Facility-Administered Medications:     budesonide-formoterol (SYMBICORT) 160-4.5 MCG/ACT inhaler 2 puff, 2 puff, Inhalation, BID, Fred Jeffrey MD, 2 puff at 08/06/23 0852    carvedilol (COREG) tablet 3.125 mg, 3.125 mg, Oral, BID With Meals, Mango Arnold MD, 3.125 mg at 08/06/23 0928    cholecalciferol (VITAMIN D3) tablet 1,000 Units, 1,000 Units, Oral, Daily, Fred Jeffrey MD, 1,000 Units at 08/06/23 0928    cyanocobalamin injection 1,000 mcg, 1,000 mcg, Intramuscular, Daily, Tomer Jeong MD, 1,000 mcg at 08/06/23 0928    diphenhydrAMINE (BENADRYL) injection 50 mg, 50 mg, Intravenous, Once PRN, Tomer Jeong,  MD    famotidine (PEPCID) injection 20 mg, 20 mg, Intravenous, Once PRN, Tomer Jeong MD    gabapentin (NEURONTIN) capsule 600 mg, 600 mg, Oral, Q12H, Fred Jeffrey MD, 600 mg at 08/06/23 0928    guaiFENesin (MUCINEX) 12 hr tablet 600 mg, 600 mg, Oral, Q12H, Mango Arnold MD, 600 mg at 08/06/23 0928    hydrALAZINE (APRESOLINE) tablet 100 mg, 100 mg, Oral, TID, Mango Arnold MD, 100 mg at 08/06/23 0928    HYDROcodone-acetaminophen (NORCO) 7.5-325 MG per tablet 1 tablet, 1 tablet, Oral, Q4H PRN, Mango Arnold MD, 1 tablet at 08/06/23 1159    Hydrocortisone Sod Suc (PF) (Solu-CORTEF) injection 100 mg, 100 mg, Intravenous, Once PRN, Tomer Jeong MD    insulin glargine (LANTUS, SEMGLEE) injection 36 Units, 36 Units, Subcutaneous, Nightly, Mango Arnold MD, 36 Units at 08/05/23 2035    insulin lispro (HUMALOG/ADMELOG) injection 12 Units, 12 Units, Subcutaneous, TID With Meals, Mango Arnold MD, 12 Units at 08/06/23 1159    insulin lispro (HUMALOG/ADMELOG) injection 2-7 Units, 2-7 Units, Subcutaneous, 4x Daily AC & at Bedtime, Mango Arnold MD, 3 Units at 08/06/23 1200    levothyroxine (SYNTHROID, LEVOTHROID) tablet 25 mcg, 25 mcg, Oral, QAM, Fred Jeffrey MD, 25 mcg at 08/06/23 0639    lidocaine (LIDODERM) 5 % 2 patch, 2 patch, Transdermal, Q24H, Tomer Jeong MD, 2 patch at 08/06/23 0928    LORazepam (ATIVAN) tablet 1 mg, 1 mg, Oral, Q PM, Fred Jeffrey MD, 1 mg at 08/05/23 2035    methylPREDNISolone sodium succinate (SOLU-Medrol) injection 40 mg, 40 mg, Intravenous, Daily, Tomer Jeong MD, 40 mg at 08/06/23 0927    montelukast (SINGULAIR) tablet 10 mg, 10 mg, Oral, Nightly, Fred Jeffrey MD, 10 mg at 08/05/23 2035    ondansetron (ZOFRAN) injection 4 mg, 4 mg, Intravenous, Q6H PRN, Fred Jeffrey MD, 4 mg at 08/01/23 2044    pantoprazole (PROTONIX) EC tablet 40 mg, 40 mg, Oral, Q AM, Fred Jeffrey MD, 40 mg at 08/06/23 0639    sennosides-docusate (PERICOLACE) 8.6-50 MG  per tablet 2 tablet, 2 tablet, Oral, BID, Kadeem Arnold MD, 2 tablet at 08/06/23 0928    tamsulosin (FLOMAX) 24 hr capsule 0.4 mg, 0.4 mg, Oral, Daily, Fred Jeffrey MD, 0.4 mg at 08/06/23 0928    valACYclovir (VALTREX) tablet 1,000 mg, 1,000 mg, Oral, Q24H, Tomer Jeong MD, 1,000 mg at 08/06/23 0928     ASSESSMENT  Chronic ITP   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  GI and hematology to follow patient  Adjust home medications  Stress ulcer DVT prophylaxis  Supportive care  PT OT  DNR  Discussed with family nursing staff and hematology  Follow closely and further recommendation current hospital course    KADEEM ARNOLD MD    Copied text in this note has been reviewed and is accurate as of 08/06/23

## 2023-08-06 NOTE — PROGRESS NOTES
Subjective     CHIEF COMPLAINT:     Thrombocytopenia    INTERVAL HISTORY:     Patient reports decrease in the pain at the site of shingles.  She is going to have video swallow study tomorrow.    REVIEW OF SYSTEMS:  A comprehensive review of systems was obtained with pertinent positive findings as noted in the interval history above.  All other systems negative.    SCHEDULED MEDS:  budesonide-formoterol, 2 puff, Inhalation, BID  carvedilol, 3.125 mg, Oral, BID With Meals  cholecalciferol, 1,000 Units, Oral, Daily  cyanocobalamin, 1,000 mcg, Intramuscular, Daily  gabapentin, 600 mg, Oral, Q12H  guaiFENesin, 600 mg, Oral, Q12H  hydrALAZINE, 100 mg, Oral, TID  insulin glargine, 36 Units, Subcutaneous, Nightly  insulin lispro, 12 Units, Subcutaneous, TID With Meals  insulin lispro, 2-7 Units, Subcutaneous, 4x Daily AC & at Bedtime  levothyroxine, 25 mcg, Oral, QAM  lidocaine, 2 patch, Transdermal, Q24H  LORazepam, 1 mg, Oral, Q PM  methylPREDNISolone sodium succinate, 40 mg, Intravenous, Daily  montelukast, 10 mg, Oral, Nightly  pantoprazole, 40 mg, Oral, Q AM  senna-docusate sodium, 2 tablet, Oral, BID  tamsulosin, 0.4 mg, Oral, Daily  valACYclovir, 1,000 mg, Oral, Q24H      Objective   VITAL SIGNS:  Temp:  [97 øF (36.1 øC)-98 øF (36.7 øC)] 97.9 øF (36.6 øC)  Heart Rate:  [58-74] 74  Resp:  [15-17] 16  BP: (132-184)/(44-67) 184/59     PHYSICAL EXAMINATION:     GENERAL:  The patient appears in fair general condition, not in acute distress.  SKIN: Improvement in the shingles over the left lower abdomen.  No new lesions.  No petechiae.  HEAD:  Normocephalic.  EYES: Pallor.  No jaundice.  NECK:  Supple. No Masses.  CHEST: Normal respiratory effort.   CARDIAC:  No edema.   ABDOMEN: Soft.  No tenderness.  No masses.  EXTREMITIES:  No noted deformity.   PSYCH: Normal mood and affect.     RESULT REVIEW:   Results from last 7 days   Lab Units 08/06/23  0550 08/05/23  0701 08/04/23  0743 08/03/23  0712 08/02/23  0544   WBC  10*3/mm3 6.39 6.97 8.38 8.03 8.74   NEUTROS ABS 10*3/mm3 4.76 5.57 7.04* 6.32 7.24*   LYMPHS ABS 10*3/mm3  --   --  0.89  --   --    HEMOGLOBIN g/dL 9.6* 10.9* 10.9* 11.0* 10.9*   HEMATOCRIT % 29.8* 33.5* 33.1* 32.8* 33.1*   PLATELETS 10*3/mm3 65* 57* 48* 39* 28*     Results from last 7 days   Lab Units 08/06/23  0550 08/05/23  0701 08/04/23  0743 08/03/23  0712 08/02/23  0544 07/31/23 1920   SODIUM mmol/L 130* 133* 128* 130* 125* 128*   POTASSIUM mmol/L 4.6 4.6 4.2 4.2 4.1 4.0   CHLORIDE mmol/L 101 101 99 97* 89* 91*   CO2 mmol/L 20.8* 22.9 20.0* 23.6 23.3 25.1   BUN mg/dL 43* 41* 37* 50* 50* 38*   CREATININE mg/dL 1.41* 1.32* 1.44* 1.88* 2.02* 1.85*   CALCIUM mg/dL 8.2 8.4 8.1* 8.5 8.7 9.0   ALBUMIN g/dL 2.6* 3.3* 3.0*  --  3.3* 3.6   BILIRUBIN mg/dL 0.5 0.4 0.3  --  0.4 0.3   ALK PHOS U/L 31* 28* 35*  --  29* 33*   ALT (SGPT) U/L 21 13 11  --  11 14   AST (SGOT) U/L 17 16 14  --  15 17     Results from last 7 days   Lab Units 07/31/23  1920   INR  1.15*   APTT seconds 25.1         Lab 08/01/23  0934   FOLATE 14.50   VITAMIN B 12 287      Component      Latest Ref Rng 8/3/2023   Hep B Core Total Ab      Negative  Positive !    Hep B S Ab      Non-Reactive  Reactive !    Hepatitis B Surface Ag      Non-Reactive  Non-Reactive       Ultrasound liver on 8/5/2023:  Visualized portions of the pancreas are unremarkable. The  liver is normal in echogenicity. No focal liver lesion. Previous  cholecystectomy. Common duct measures about 5 mm. The right kidney  measures 11.4 cm in length and unremarkable. No ascites.     IMPRESSION:  Previous cholecystectomy, otherwise unremarkable    Assessment & Plan   *Severe thrombocytopenia.  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  She has chronic liver disease which might be contributing.  She was previously treated with IVIG with good response.  Spleen was not enlarged on CT on 5/1/2023.  She was found to have a new bruise over the abdomen with no  preceding trauma.  7/31/2023: Platelet count 17,000.  Patient was given Solu-Medrol 125 mg IV.  8/1/2023: Platelet count 16,000.  IPF: 15.3%.  7/31/2023: PT and PTT were normal.  8/2/2023: Platelet count 28,000.  8/3/2023: Platelet count 39,000.  8/4/2023: Platelet count 48,000.  Rituxan was initially recommended.  However, she was found to have positive hepatitis B core antibody.  Therefore, Rituxan was not recommended.  IVIG 45 g (1 g/kg ideal body weight) daily x2 days was started on 8/4/2023.  Solu-Medrol dose was reduced to 60 mg daily on 8/4/2023.  8/5/2023: Platelet count 57,000.  8/6/2023: Platelet count 65,000.     *Vitamin B12 deficiency.  Vitamin B12 was 236 on 3/12/2023.  She has not been taking vitamin B12.  8/1/2023: Vitamin B12 287.  Patient started on vitamin B12 injections daily.  8/3/2023: Hemoglobin 11.0.  8/4/2023: Hemoglobin 10.9.  8/5/2023: Hemoglobin 10.9.  8/6/2023: Hemoglobin 9.6.     *Chronic kidney disease, stage 4.  7/31/2023: Creatinine 1.85.  8/2/2023: Creatinine 2.02.  8/3/2023: Creatinine 1.88.  8/4/2023: Creatinine 1.44.  8/5/2023: Creatinine 1.32.  8/6/2023: Creatinine 1.41.    *Shingles affecting the left side of the abdomen.  She was started on valacyclovir 1 g once daily-dose reduced due to decreased kidney function.  There was some worsening of the rash on 8/3/2023.  Valacyclovir 1 g daily was continued.  Lidoderm patches were added to help with the pain.    *Vaginal yeast infection  Patient had a recent UTI and was treated with levofloxacin.  Patient developed vaginal yeast infection following the antibiotic therapy and was given 1 dose of Diflucan last week.    Dose #2 was given on 8/3/2023.    *Positive Hepatitis B core Ab.  No prior history of hepatitis.   GI service was consulted.  Work-up was initiated with hepatitis B viral load.  Ultrasound liver reported the liver to be normal in echogenicity.  No focal liver lesions.     PLAN:     1.  Reduce Solu-Medrol to 40 mg daily.    2.  Continue valacyclovir.  3.  Repeat CBC in AM.    Discussed with daughter at bedside.      Tomer Jeong MD  08/06/23

## 2023-08-07 ENCOUNTER — APPOINTMENT (OUTPATIENT)
Dept: GENERAL RADIOLOGY | Facility: HOSPITAL | Age: 88
DRG: 813 | End: 2023-08-07
Payer: MEDICARE

## 2023-08-07 ENCOUNTER — TELEPHONE (OUTPATIENT)
Dept: GASTROENTEROLOGY | Facility: CLINIC | Age: 88
End: 2023-08-07
Payer: MEDICARE

## 2023-08-07 LAB
ANION GAP SERPL CALCULATED.3IONS-SCNC: 9.4 MMOL/L (ref 5–15)
BASOPHILS # BLD AUTO: 0.01 10*3/MM3 (ref 0–0.2)
BASOPHILS NFR BLD AUTO: 0.1 % (ref 0–1.5)
BUN SERPL-MCNC: 46 MG/DL (ref 8–23)
BUN/CREAT SERPL: 29.5 (ref 7–25)
CALCIUM SPEC-SCNC: 8.2 MG/DL (ref 8.2–9.6)
CHLORIDE SERPL-SCNC: 102 MMOL/L (ref 98–107)
CO2 SERPL-SCNC: 21.6 MMOL/L (ref 22–29)
CREAT SERPL-MCNC: 1.56 MG/DL (ref 0.57–1)
DEPRECATED RDW RBC AUTO: 51.8 FL (ref 37–54)
EGFRCR SERPLBLD CKD-EPI 2021: 31.1 ML/MIN/1.73
EOSINOPHIL # BLD AUTO: 0.05 10*3/MM3 (ref 0–0.4)
EOSINOPHIL NFR BLD AUTO: 0.6 % (ref 0.3–6.2)
ERYTHROCYTE [DISTWIDTH] IN BLOOD BY AUTOMATED COUNT: 17.3 % (ref 12.3–15.4)
FERRITIN SERPL-MCNC: 340 NG/ML (ref 13–150)
GLUCOSE BLDC GLUCOMTR-MCNC: 154 MG/DL (ref 70–130)
GLUCOSE BLDC GLUCOMTR-MCNC: 159 MG/DL (ref 70–130)
GLUCOSE BLDC GLUCOMTR-MCNC: 261 MG/DL (ref 70–130)
GLUCOSE BLDC GLUCOMTR-MCNC: 344 MG/DL (ref 70–130)
GLUCOSE SERPL-MCNC: 134 MG/DL (ref 65–99)
HBV E AB SERPL QL IA: POSITIVE
HCT VFR BLD AUTO: 31.9 % (ref 34–46.6)
HGB BLD-MCNC: 10.4 G/DL (ref 12–15.9)
IMM GRANULOCYTES # BLD AUTO: 0.33 10*3/MM3 (ref 0–0.05)
IMM GRANULOCYTES NFR BLD AUTO: 4.2 % (ref 0–0.5)
IRON 24H UR-MRATE: 120 MCG/DL (ref 37–145)
IRON SATN MFR SERPL: 42 % (ref 20–50)
LYMPHOCYTES # BLD AUTO: 1.73 10*3/MM3 (ref 0.7–3.1)
LYMPHOCYTES NFR BLD AUTO: 21.9 % (ref 19.6–45.3)
MCH RBC QN AUTO: 27.4 PG (ref 26.6–33)
MCHC RBC AUTO-ENTMCNC: 32.6 G/DL (ref 31.5–35.7)
MCV RBC AUTO: 84.2 FL (ref 79–97)
MONOCYTES # BLD AUTO: 0.81 10*3/MM3 (ref 0.1–0.9)
MONOCYTES NFR BLD AUTO: 10.3 % (ref 5–12)
NEUTROPHILS NFR BLD AUTO: 4.97 10*3/MM3 (ref 1.7–7)
NEUTROPHILS NFR BLD AUTO: 62.9 % (ref 42.7–76)
NRBC BLD AUTO-RTO: 1.4 /100 WBC (ref 0–0.2)
PLATELET # BLD AUTO: 86 10*3/MM3 (ref 140–450)
PMV BLD AUTO: 10.4 FL (ref 6–12)
POTASSIUM SERPL-SCNC: 5.1 MMOL/L (ref 3.5–5.2)
RBC # BLD AUTO: 3.79 10*6/MM3 (ref 3.77–5.28)
SODIUM SERPL-SCNC: 133 MMOL/L (ref 136–145)
TIBC SERPL-MCNC: 285 MCG/DL (ref 298–536)
TRANSFERRIN SERPL-MCNC: 191 MG/DL (ref 200–360)
WBC NRBC COR # BLD: 7.9 10*3/MM3 (ref 3.4–10.8)

## 2023-08-07 PROCEDURE — 99232 SBSQ HOSP IP/OBS MODERATE 35: CPT | Performed by: INTERNAL MEDICINE

## 2023-08-07 PROCEDURE — 94664 DEMO&/EVAL PT USE INHALER: CPT

## 2023-08-07 PROCEDURE — 82728 ASSAY OF FERRITIN: CPT | Performed by: INTERNAL MEDICINE

## 2023-08-07 PROCEDURE — 94799 UNLISTED PULMONARY SVC/PX: CPT

## 2023-08-07 PROCEDURE — 83540 ASSAY OF IRON: CPT | Performed by: INTERNAL MEDICINE

## 2023-08-07 PROCEDURE — 63710000001 INSULIN GLARGINE PER 5 UNITS: Performed by: HOSPITALIST

## 2023-08-07 PROCEDURE — 84466 ASSAY OF TRANSFERRIN: CPT | Performed by: INTERNAL MEDICINE

## 2023-08-07 PROCEDURE — 97110 THERAPEUTIC EXERCISES: CPT

## 2023-08-07 PROCEDURE — 82948 REAGENT STRIP/BLOOD GLUCOSE: CPT

## 2023-08-07 PROCEDURE — 85025 COMPLETE CBC W/AUTO DIFF WBC: CPT | Performed by: INTERNAL MEDICINE

## 2023-08-07 PROCEDURE — 74221 X-RAY XM ESOPHAGUS 2CNTRST: CPT

## 2023-08-07 PROCEDURE — 97530 THERAPEUTIC ACTIVITIES: CPT

## 2023-08-07 PROCEDURE — 25010000002 METHYLPREDNISOLONE PER 40 MG: Performed by: INTERNAL MEDICINE

## 2023-08-07 PROCEDURE — 63710000001 INSULIN LISPRO (HUMAN) PER 5 UNITS: Performed by: HOSPITALIST

## 2023-08-07 PROCEDURE — 80048 BASIC METABOLIC PNL TOTAL CA: CPT | Performed by: HOSPITALIST

## 2023-08-07 RX ORDER — PREDNISONE 20 MG/1
20 TABLET ORAL
Status: DISCONTINUED | OUTPATIENT
Start: 2023-08-08 | End: 2023-08-10 | Stop reason: HOSPADM

## 2023-08-07 RX ORDER — POLYETHYLENE GLYCOL 3350 17 G/17G
17 POWDER, FOR SOLUTION ORAL DAILY
Status: DISCONTINUED | OUTPATIENT
Start: 2023-08-07 | End: 2023-08-10 | Stop reason: HOSPADM

## 2023-08-07 RX ORDER — CHOLECALCIFEROL (VITAMIN D3) 125 MCG
1000 CAPSULE ORAL DAILY
Status: DISCONTINUED | OUTPATIENT
Start: 2023-08-07 | End: 2023-08-10 | Stop reason: HOSPADM

## 2023-08-07 RX ADMIN — HYDROCODONE BITARTRATE AND ACETAMINOPHEN 1 TABLET: 7.5; 325 TABLET ORAL at 08:35

## 2023-08-07 RX ADMIN — HYDRALAZINE HYDROCHLORIDE 100 MG: 50 TABLET, FILM COATED ORAL at 20:23

## 2023-08-07 RX ADMIN — INSULIN LISPRO 12 UNITS: 100 INJECTION, SOLUTION INTRAVENOUS; SUBCUTANEOUS at 18:36

## 2023-08-07 RX ADMIN — INSULIN LISPRO 12 UNITS: 100 INJECTION, SOLUTION INTRAVENOUS; SUBCUTANEOUS at 08:35

## 2023-08-07 RX ADMIN — SENNOSIDES AND DOCUSATE SODIUM 2 TABLET: 50; 8.6 TABLET ORAL at 20:23

## 2023-08-07 RX ADMIN — MONTELUKAST SODIUM 10 MG: 10 TABLET, FILM COATED ORAL at 20:23

## 2023-08-07 RX ADMIN — GABAPENTIN 600 MG: 300 CAPSULE ORAL at 20:23

## 2023-08-07 RX ADMIN — BUDESONIDE AND FORMOTEROL FUMARATE DIHYDRATE 2 PUFF: 160; 4.5 AEROSOL RESPIRATORY (INHALATION) at 20:06

## 2023-08-07 RX ADMIN — INSULIN GLARGINE 36 UNITS: 100 INJECTION, SOLUTION SUBCUTANEOUS at 22:35

## 2023-08-07 RX ADMIN — SENNOSIDES AND DOCUSATE SODIUM 2 TABLET: 50; 8.6 TABLET ORAL at 08:35

## 2023-08-07 RX ADMIN — INSULIN LISPRO 2 UNITS: 100 INJECTION, SOLUTION INTRAVENOUS; SUBCUTANEOUS at 12:39

## 2023-08-07 RX ADMIN — VALACYCLOVIR HYDROCHLORIDE 1000 MG: 500 TABLET, FILM COATED ORAL at 08:48

## 2023-08-07 RX ADMIN — INSULIN LISPRO 5 UNITS: 100 INJECTION, SOLUTION INTRAVENOUS; SUBCUTANEOUS at 22:35

## 2023-08-07 RX ADMIN — CARVEDILOL 3.12 MG: 3.12 TABLET, FILM COATED ORAL at 08:35

## 2023-08-07 RX ADMIN — ANTACID/ANTIFLATULENT 1 PACKET: 380; 550; 10; 10 GRANULE, EFFERVESCENT ORAL at 11:49

## 2023-08-07 RX ADMIN — INSULIN LISPRO 4 UNITS: 100 INJECTION, SOLUTION INTRAVENOUS; SUBCUTANEOUS at 18:36

## 2023-08-07 RX ADMIN — GUAIFENESIN 600 MG: 600 TABLET, EXTENDED RELEASE ORAL at 08:34

## 2023-08-07 RX ADMIN — HYDROCODONE BITARTRATE AND ACETAMINOPHEN 1 TABLET: 7.5; 325 TABLET ORAL at 04:06

## 2023-08-07 RX ADMIN — LEVOTHYROXINE SODIUM 25 MCG: 0.03 TABLET ORAL at 06:34

## 2023-08-07 RX ADMIN — HYDROCODONE BITARTRATE AND ACETAMINOPHEN 1 TABLET: 7.5; 325 TABLET ORAL at 22:35

## 2023-08-07 RX ADMIN — HYDROCODONE BITARTRATE AND ACETAMINOPHEN 1 TABLET: 7.5; 325 TABLET ORAL at 00:05

## 2023-08-07 RX ADMIN — INSULIN LISPRO 12 UNITS: 100 INJECTION, SOLUTION INTRAVENOUS; SUBCUTANEOUS at 12:38

## 2023-08-07 RX ADMIN — HYDRALAZINE HYDROCHLORIDE 100 MG: 50 TABLET, FILM COATED ORAL at 18:37

## 2023-08-07 RX ADMIN — GABAPENTIN 600 MG: 300 CAPSULE ORAL at 08:37

## 2023-08-07 RX ADMIN — TAMSULOSIN HYDROCHLORIDE 0.4 MG: 0.4 CAPSULE ORAL at 08:34

## 2023-08-07 RX ADMIN — HYDRALAZINE HYDROCHLORIDE 100 MG: 50 TABLET, FILM COATED ORAL at 08:34

## 2023-08-07 RX ADMIN — METHYLPREDNISOLONE SODIUM SUCCINATE 40 MG: 40 INJECTION, POWDER, FOR SOLUTION INTRAMUSCULAR; INTRAVENOUS at 08:35

## 2023-08-07 RX ADMIN — INSULIN LISPRO 2 UNITS: 100 INJECTION, SOLUTION INTRAVENOUS; SUBCUTANEOUS at 08:37

## 2023-08-07 RX ADMIN — LORAZEPAM 1 MG: 1 TABLET ORAL at 20:23

## 2023-08-07 RX ADMIN — BUDESONIDE AND FORMOTEROL FUMARATE DIHYDRATE 2 PUFF: 160; 4.5 AEROSOL RESPIRATORY (INHALATION) at 09:00

## 2023-08-07 RX ADMIN — CARVEDILOL 3.12 MG: 3.12 TABLET, FILM COATED ORAL at 18:36

## 2023-08-07 RX ADMIN — HYDROCODONE BITARTRATE AND ACETAMINOPHEN 1 TABLET: 7.5; 325 TABLET ORAL at 18:40

## 2023-08-07 RX ADMIN — Medication 1000 MCG: at 20:28

## 2023-08-07 RX ADMIN — BARIUM SULFATE 183 ML: 960 POWDER, FOR SUSPENSION ORAL at 11:48

## 2023-08-07 RX ADMIN — Medication 1000 UNITS: at 08:35

## 2023-08-07 RX ADMIN — HYDROCODONE BITARTRATE AND ACETAMINOPHEN 1 TABLET: 7.5; 325 TABLET ORAL at 12:38

## 2023-08-07 RX ADMIN — PANTOPRAZOLE SODIUM 40 MG: 40 TABLET, DELAYED RELEASE ORAL at 06:34

## 2023-08-07 RX ADMIN — GUAIFENESIN 600 MG: 600 TABLET, EXTENDED RELEASE ORAL at 20:23

## 2023-08-07 RX ADMIN — POLYETHYLENE GLYCOL 3350 17 G: 17 POWDER, FOR SOLUTION ORAL at 18:36

## 2023-08-07 NOTE — PROGRESS NOTES
[facility] CBC GROUP INPATIENT PROGRESS NOTE    Length of Stay:  6 days    CHIEF COMPLAINT:  Thrombocytopenia, B12 deficiency, CKD4, shingles, prior hepatitis B infection    SUBJECTIVE:   Patient awake and alert today, reports mild chronic ongoing back pain.  No bleeding issues.  She notes mild continued fatigue.  Continues with constipation, notes it has been 3 to 4 days since her last bowel movement.    ROS:  Review of Systems   A comprehensive review of systems was obtained with pertinent positive findings as noted in the interval history above.  All other systems negative.      OBJECTIVE:  Vitals:    08/06/23 1957 08/07/23 0343 08/07/23 0744 08/07/23 0900   BP: 150/74 170/64 160/61    BP Location: Left arm Left arm Left arm    Patient Position: Lying Lying Lying    Pulse: 60 59 56 60   Resp: 18 16 16 18   Temp: 97 øF (36.1 øC) 97.6 øF (36.4 øC) 97 øF (36.1 øC)    TempSrc: Oral Oral Oral    SpO2: 95% 97% 97% 96%   Weight:       Height:             PHYSICAL EXAMINATION:  General: Elderly female no distress lying in bed  Chest/Lungs: Clear to auscultation bilaterally  Heart: Irregularly irregular  Abdomen/GI:  mildly distended, slightly tender  Extremities: Trace-1+ bilateral lower extremity edema  Skin: Shingles rash has improved, lesions crusted, improving    DIAGNOSTIC DATA:  Results Review:     I reviewed the patient's new clinical results.    Results from last 7 days   Lab Units 08/07/23  0738 08/06/23  0550 08/05/23  0701   WBC 10*3/mm3 7.90 6.39 6.97   HEMOGLOBIN g/dL 10.4* 9.6* 10.9*   HEMATOCRIT % 31.9* 29.8* 33.5*   PLATELETS 10*3/mm3 86* 65* 57*      Results from last 7 days   Lab Units 08/07/23  0738 08/06/23  0550 08/05/23  0701 08/04/23  0743   SODIUM mmol/L 133* 130* 133* 128*   POTASSIUM mmol/L 5.1 4.6 4.6 4.2   CHLORIDE mmol/L 102 101 101 99   CO2 mmol/L 21.6* 20.8* 22.9 20.0*   BUN mg/dL 46* 43* 41* 37*   CREATININE mg/dL 1.56* 1.41* 1.32* 1.44*   CALCIUM mg/dL 8.2 8.2 8.4 8.1*   BILIRUBIN  mg/dL  --  0.5 0.4 0.3   ALK PHOS U/L  --  31* 28* 35*   ALT (SGPT) U/L  --  21 13 11   AST (SGOT) U/L  --  17 16 14   GLUCOSE mg/dL 134* 267* 142* 275*      Lab Results   Component Value Date    NEUTROABS 4.97 08/07/2023     Results from last 7 days   Lab Units 07/31/23  1920   INR  1.15*   APTT seconds 25.1               Assessment & Plan   ASSESSMENT/PLAN:  This is a 89 y.o. female with:     *Severe thrombocytopenia.  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  She has chronic liver disease which might be contributing.  She was previously treated with IVIG with good response.  Spleen was not enlarged on CT on 5/1/2023.  Previously elected to maintain patient on low-dose prednisone to maintain platelet count at least in the 50-19208 range, previously receiving prednisone 10 mg daily with relatively stable platelet count  She was found to have a new bruise over the abdomen with no preceding trauma.  7/31/2023: Platelet count 17,000.  Patient was given Solu-Medrol 125 mg IV.  8/1/2023: Platelet count 16,000.  IPF: 15.3%.  7/31/2023: PT and PTT were normal.  8/2/2023: Platelet count 28,000.  8/3/2023: Platelet count 39,000.  8/4/2023: Platelet count 48,000.  Rituxan was initially recommended.  However, she was found to have positive hepatitis B core antibody.  Therefore, Rituxan was not recommended.  Per GI, patient at risk for reactivation of hepatitis B and would require treatment with tenofovir if rituximab pursued.  IVIG 45 g (1 g/kg ideal body weight) daily x2 days was started on 8/4/2023.  Solu-Medrol dose was reduced to 60 mg daily on 8/4/2023.  8/5/2023: Platelet count 57,000.  8/6/2023: Platelet count 65,000.  Solu-Medrol dose decreased to 40 mg daily on 8/6/2023 8/7/2023: Platelet count 86,000.  We will transition from Solu-Medrol 40 mg daily to prednisone 20 mg daily on 8/8/2023     *Vitamin B12 deficiency.  Vitamin B12 was 236 on 3/12/2023.  She has not been taking vitamin  B12.  8/1/2023: Vitamin B12 287.  Patient started on vitamin B12 injections daily on 8/2/2020 3 x 5 days.  Resume oral B12 1000 mcg daily    *Anemia  Most recent anemia evaluation 3/12/2023 with iron 135, ferritin 295, iron saturation 51%, TIBC 264  Significant component of anemia secondary to chronic disease and CKD  See above regarding B12 deficiency  Folate level normal at 14.5 on 8/1/2023  Hemoglobin fairly stable in the 10-11 range  Hemoglobin today 10.4.  We will send off additional evaluation with iron panel, ferritin     *Chronic kidney disease, stage 4.  7/31/2023: Creatinine 1.85.  8/2/2023: Creatinine 2.02.  8/3/2023: Creatinine 1.88.  8/4/2023: Creatinine 1.44.  8/5/2023: Creatinine 1.32.  8/6/2023: Creatinine 1.41.     *Shingles affecting the left side of the abdomen.  She was started on valacyclovir 1 g once daily-dose reduced due to decreased kidney function.  There was some worsening of the rash on 8/3/2023.  Valacyclovir 1 g daily x14 days initiated 8/2/2023  Improving     *Vaginal yeast infection  Patient had a recent UTI and was treated with levofloxacin.  Patient developed vaginal yeast infection following the antibiotic therapy and was given 1 dose of Diflucan     Dose #2 was given on 8/3/2023.     *Positive Hepatitis B core Ab.  No known prior history of hepatitis.   Imaging studies previously noted to be consistent with cirrhotic appearance of liver  Patient was seen by GI, additional laboratory studies obtained on 8/4/23 including positive hepatitis B E antibody, negative hepatitis B E antigen negative HBV PCR, positive hepatitis A antibody, negative hepatitis A IgM, negative hepatitis C antibody  Per GI, patient at risk for reactivation of hepatitis B if treated with rituximab.  Recommended use of concurrent tenofovir if rituximab is pursued in the future.    *GI prophylaxis  Protonix 40 mg daily    *Possible aspiration  Patient undergoing evaluation per  hospitalist    *Constipation  Currently receiving Amanda-Colace 2 tablets twice daily  Patient reports no bowel movement in the past 3 to 4 days, and daily MiraLAX    *CODE STATUS  Patient is DNR, continue current level of support      PLAN:     Additional labs today with iron panel, ferritin  Transition from Solu-Medrol 40 mg daily to prednisone 20 mg daily on 8/8/2023  Begin oral B12 1000 mcg daily  Continue Valtrex 1000 mg daily x14 days (initiated 8/2/2023) for shingles  We do not plan to pursue rituximab at this time  Continue Amanda-Colace 2 tablets twice daily and add daily MiraLAX  Daily CBC    Discussed with patient and family at bedside.         Pablo Sherman MD

## 2023-08-07 NOTE — TELEPHONE ENCOUNTER
----- Message from Ngoc Watt MD sent at 8/6/2023  5:23 PM EDT -----  Hosp f/u Dr Owusu or LUCR[facility]IA 4-6 weeks thx

## 2023-08-07 NOTE — PLAN OF CARE
Goal Outcome Evaluation: Patient is alert. Room air. Pain being managed per MAR. Esophagram completed. BS managed per MAR.

## 2023-08-07 NOTE — PLAN OF CARE
Goal Outcome Evaluation:  Plan of Care Reviewed With: patient, daughter           Outcome Evaluation: Pt participated with PT this AM. Pt reports she has not been up out of  bed all weekend and was agreeable to get to the chair. She did require increased assist for bed mobility and STS today. Unable to ambulate further than 3' to the chair due to fatigue. Will continue to follow and progress as able.

## 2023-08-07 NOTE — THERAPY TREATMENT NOTE
Patient Name: Helena Carmen  : 1931    MRN: [id number]                              Today's Date: 2023       Admit Date: 2023    Visit Dx:     ICD-10-CM ICD-9-CM   1. Uncontrolled hypertension  I10 401.9   2. Thrombocytopenia  D69.6 287.5   3. Hyponatremia  E87.1 276.1   4. History of ITP  Z86.2 V12.3   5. Rash (left flank)  R21 782.1     Patient Active Problem List   Diagnosis    Aortic valve stenosis    Fatigue    MI (mitral incompetence)    PVC (premature ventricular contraction)    Legally blind    Essential hypertension    Hypertriglyceridemia    Pulmonary hypertension    Paroxysmal atrial fibrillation    Anxiety    Stage 4 chronic kidney disease    Chronic pain disorder    Degeneration of lumbar intervertebral disc    Type 2 diabetes mellitus with hyperglycemia    Esophageal reflux    Gastroparesis    Hiatal hernia    Iatrogenic hypothyroidism    Macular degeneration    Malignant neoplasm of uterus    Osteoarthritis of knee    Peripheral nerve disease    Secondary hyperparathyroidism    Thrombocytopenia    Chronic heart failure with preserved ejection fraction    Other cirrhosis of liver    Hypothyroidism    Nonrheumatic tricuspid valve regurgitation    Anemia, chronic disease    Hyponatremia    Closed fracture of fifth lumbar vertebra    Closed fracture of fifth lumbar vertebra, unspecified fracture morphology, initial encounter    Diabetic polyneuropathy associated with type 2 diabetes mellitus    Chronic ITP (idiopathic thrombocytopenia)    Chronic midline low back pain with bilateral sciatica    Acute deep vein thrombosis of calf, bilateral    Compression fracture of L5 vertebra with routine healing    Acute left-sided low back pain with left-sided sciatica    Urine retention    Rib fracture    Acute on chronic combined systolic and diastolic congestive heart failure    Acute UTI    Blindness right eye category 3, blindness left eye category 3    Cognitive communication deficit     Pseudomonas (aeruginosa) (mallei) (pseudomallei) as the cause of diseases classified elsewhere    Multifocal pneumonia     Past Medical History:   Diagnosis Date    Aortic valve stenosis     s/p tissue AVR    Back pain     CKD (chronic kidney disease)     Colitis due to Clostridioides difficile 01/26/2022    Diastolic dysfunction     Grade 2 per echocardiogram 2013    Diverticulosis     Exertional shortness of breath     Heart disease     Hiatal hernia     Hyperlipidemia     Hypertension     Hyperthyroidism     Hypertriglyceridemia 05/31/2018    Hypothyroidism     L5 compression fracture (HCC) 08/2022    Left ventricular hypertrophy     Legally blind     Liver disease     Low back pain     Macular degeneration     Mitral regurgitation     Osteoarthritis of hip     Osteoporosis     Pancreatitis 01/26/2022    Paroxysmal atrial fibrillation     Peripheral neuropathy     Premature ventricular contractions     Pulmonary hypertension     Renal insufficiency syndrome     Type 2 diabetes mellitus     Uterine cancer      Past Surgical History:   Procedure Laterality Date    AORTIC VALVE REPAIR/REPLACEMENT      APPENDECTOMY      CATARACT EXTRACTION      1970, 1999    CHOLECYSTECTOMY      COLONOSCOPY      ENDOSCOPY  08/15/2014    no gross lesions in stomach/duodenum, erythrematous mucosa in stomach    EPIDURAL BLOCK      HYSTERECTOMY  2007    STERNOTOMY      UPPER GASTROINTESTINAL ENDOSCOPY        General Information       Row Name 08/07/23 1046          Physical Therapy Time and Intention    Document Type therapy note (daily note)  -CW     Mode of Treatment physical therapy;individual therapy  -CW       Row Name 08/07/23 1046          General Information    Patient Profile Reviewed yes  -CW     Existing Precautions/Restrictions fall  -CW       Row Name 08/07/23 1046          Cognition    Orientation Status (Cognition) oriented x 3  -CW       Row Name 08/07/23 1046          Safety Issues, Functional Mobility    Impairments  Affecting Function (Mobility) balance;endurance/activity tolerance;postural/trunk control;strength;visual/perceptual  -CW               User Key  (r) = Recorded By, (t) = Taken By, (c) = Cosigned By      Initials Name Provider Type    Trudy Conley PT Physical Therapist                   Mobility       Row Name 08/07/23 1522          Bed Mobility    Bed Mobility supine-sit  -CW     Supine-Sit Clark (Bed Mobility) minimum assist (75% patient effort);moderate assist (50% patient effort);1 person assist;verbal cues  -CW     Assistive Device (Bed Mobility) head of bed elevated;bed rails  -CW     Comment, (Bed Mobility) Increased time to complete  -CW       Row Name 08/07/23 1522          Bed-Chair Transfer    Bed-Chair Clark (Transfers) minimum assist (75% patient effort);1 person assist;verbal cues  -CW       Row Name 08/07/23 1522          Sit-Stand Transfer    Sit-Stand Clark (Transfers) minimum assist (75% patient effort);2 person assist;verbal cues  -CW       Row Name 08/07/23 1522          Gait/Stairs (Locomotion)    Clark Level (Gait) minimum assist (75% patient effort);verbal cues;1 person assist  -CW     Distance in Feet (Gait) 3' to the chair  -CW     Deviations/Abnormal Patterns (Gait) gait speed decreased;base of support, narrow;stride length decreased;weight shifting decreased;paul decreased  -CW     Bilateral Gait Deviations forward flexed posture  -CW     Comment, (Gait/Stairs) Very forward flexed, nearly to 90 degrees hip flexion. Cues for upright posture. Fatigue limiting  -CW               User Key  (r) = Recorded By, (t) = Taken By, (c) = Cosigned By      Initials Name Provider Type    Trudy Conley PT Physical Therapist                   Obj/Interventions       Row Name 08/07/23 1524          Motor Skills    Therapeutic Exercise other (see comments)  Ap, laq, marches, glute sets with 2 sec hold x10 reps  -CW               User Key  (r) = Recorded By, (t)  = Taken By, (c) = Cosigned By      Initials Name Provider Type    Trudy Conley, PT Physical Therapist                   Goals/Plan    No documentation.                  Clinical Impression       Row Name 08/07/23 1525          Pain    Pretreatment Pain Rating 0/10 - no pain  -CW       Row Name 08/07/23 1525          Plan of Care Review    Plan of Care Reviewed With patient;daughter  -     Outcome Evaluation Pt participated with PT this AM. Pt reports she has not been up out of  bed all weekend and was agreeable to get to the chair. She did require increased assist for bed mobility and STS today. Unable to ambulate further than 3' to the chair due to fatigue. Will continue to follow and progress as able.  -CW       Row Name 08/07/23 1525          Vital Signs    O2 Delivery Pre Treatment room air  -CW       Row Name 08/07/23 1525          Positioning and Restraints    Pre-Treatment Position in bed  -CW     Post Treatment Position chair  -CW     In Chair reclined;call light within reach;encouraged to call for assist;exit alarm on;legs elevated;notified nsg;with family/caregiver  -CW               User Key  (r) = Recorded By, (t) = Taken By, (c) = Cosigned By      Initials Name Provider Type    Trudy Conley, PT Physical Therapist                   Outcome Measures       Row Name 08/07/23 1526 08/07/23 0950       How much help from another person do you currently need...    Turning from your back to your side while in flat bed without using bedrails? 3  -CW 3  -DH    Moving from lying on back to sitting on the side of a flat bed without bedrails? 2  -CW 2  -DH    Moving to and from a bed to a chair (including a wheelchair)? 3  -CW 3  -DH    Standing up from a chair using your arms (e.g., wheelchair, bedside chair)? 2  -CW 3  -DH    Climbing 3-5 steps with a railing? 1  -CW 2  -DH    To walk in hospital room? 2  -CW 3  -DH    AM-PAC 6 Clicks Score (PT) 13  -CW 16  -DH    Highest level of mobility 4 -->  Transferred to chair/commode  - 5 --> Static standing  -      Row Name 08/07/23 1526          Functional Assessment    Outcome Measure Options AM-PAC 6 Clicks Basic Mobility (PT)  -               User Key  (r) = Recorded By, (t) = Taken By, (c) = Cosigned By      Initials Name Provider Type     Dorinda Miller, RN Registered Nurse    Trudy Conley, PT Physical Therapist                                 Physical Therapy Education       Title: PT OT SLP Therapies (In Progress)       Topic: Physical Therapy (Done)       Point: Mobility training (Done)       Learning Progress Summary             Patient Acceptance, E, VU by  at 8/7/2023 1526    Acceptance, E,TB, VU,NR by SK at 8/4/2023 1452    Acceptance, E,TB, VU,NR by SK at 8/2/2023 1534                         Point: Body mechanics (Done)       Learning Progress Summary             Patient Acceptance, E,TB, VU,NR by SK at 8/4/2023 1452    Acceptance, E,TB, VU,NR by SK at 8/2/2023 1534                         Point: Precautions (Done)       Learning Progress Summary             Patient Acceptance, E,TB, VU,NR by SK at 8/4/2023 1452    Acceptance, E,TB, VU,NR by SK at 8/2/2023 1534                                         User Key       Initials Effective Dates Name Provider Type Discipline     12/13/22 -  Trudy Mason, PT Physical Therapist PT    SK 07/28/23 -  Jocy Modi, CANDY Student PT Student PT                  PT Recommendation and Plan     Plan of Care Reviewed With: patient, daughter  Outcome Evaluation: Pt participated with PT this AM. Pt reports she has not been up out of  bed all weekend and was agreeable to get to the chair. She did require increased assist for bed mobility and STS today. Unable to ambulate further than 3' to the chair due to fatigue. Will continue to follow and progress as able.     Time Calculation:         PT Charges       Row Name 08/07/23 1046             Time Calculation    Start Time 0931  -      Stop  Time 0954  -CW      Time Calculation (min) 23 min  -CW      PT Received On 08/07/23  -CW      PT - Next Appointment 08/08/23  -CW                User Key  (r) = Recorded By, (t) = Taken By, (c) = Cosigned By      Initials Name Provider Type    Trudy Conley, CANDY Physical Therapist                  Therapy Charges for Today       Code Description Service Date Service Provider Modifiers Qty    54883503999 HC PT THERAPEUTIC ACT EA 15 MIN 8/7/2023 Trudy Mason, PT GP 1    77157653170 HC PT THER PROC EA 15 MIN 8/7/2023 Trudy Mason, PT GP 1            PT G-Codes  Outcome Measure Options: AM-PAC 6 Clicks Basic Mobility (PT)  AM-PAC 6 Clicks Score (PT): 13  AM-PAC 6 Clicks Score (OT): 14  PT Discharge Summary  Anticipated Discharge Disposition (PT): skilled nursing facility, home with 24/7 care, home with home health    Trudy Mason PT  8/7/2023

## 2023-08-07 NOTE — PROGRESS NOTES
"Daily progress note    Primary care physician      Subjective  Doing same with no new complaints and family at bedside.  Patient just finished esophagogram.    History of present illness  89-year-old white female with very complex past medical history and well-known to our service including ITP chronic low back pain with L5 vertebral body fracture degenerative disease paroxysmal atrial fibrillation DVT diabetes hypertension hyperlipidemia hypothyroidism congestive heart failure and chronic kidney disease stage III who lives at home with her daughter brought to the emergency room with generalized weakness and bruises all over.  Patient work-up in ER revealed worsening thrombocytopenia admitted for management.  Patient denies any fever chills chest pain shortness of breath abdominal pain nausea vomiting diarrhea.  Patient is DNR per her wishes.     REVIEW OF SYSTEMS  Unremarkable      PHYSICAL EXAM  Blood pressure 141/65, pulse 66, temperature 97 øF (36.1 øC), temperature source Oral, resp. rate 18, height 144.8 cm (57.01\"), weight 80.1 kg (176 lb 9.4 oz), SpO2 97 %.    Constitutional:       General: She is not in acute distress.     Appearance: She is well-developed.   HENT:      Head: Normocephalic and atraumatic.   Eyes:      Extraocular Movements: Extraocular movements intact.   Cardiovascular:      Rate and Rhythm: Normal rate and regular rhythm.      Heart sounds: Normal heart sounds.   Pulmonary:      Effort: Pulmonary effort is normal.      Breath sounds: Normal breath sounds.   Abdominal:      General: There is no distension.      Comments: Erythematous macular clusters noted along the left flank   Genitourinary:     Comments: Indwelling Hoffman catheter  Skin:     General: Skin is warm.   Neurological:      General: No focal deficit present.      Mental Status: She is alert and oriented to person, place, and time.   Psychiatric:         Mood and Affect: Mood normal.      LAB RESULTS  Lab Results " (last 24 hours)       Procedure Component Value Units Date/Time    Hepatitis B E Antibody [[id number]]  (Abnormal) Collected: 08/04/23 1756    Specimen: Blood Updated: 08/07/23 1308     Hep B E Ab Positive    Narrative:      Performed at:  [address] [place] [address]  [id number]  : Toño Flores PhD, Phone:  [phone]    POC Glucose Once [[id number]]  (Abnormal) Collected: 08/07/23 1201    Specimen: Blood Updated: 08/07/23 1202     Glucose 159 mg/dL      Comment: Meter: [id number] : [id number] Sarwat JOHNSON       CBC & Differential [[id number]]  (Abnormal) Collected: 08/07/23 0738    Specimen: Blood Updated: 08/07/23 0852    Narrative:      The following orders were created for panel order CBC & Differential.  Procedure                               Abnormality         Status                     ---------                               -----------         ------                     CBC Auto Differential[[id number]]        Abnormal            Final result                 Please view results for these tests on the individual orders.    CBC Auto Differential [[id number]]  (Abnormal) Collected: 08/07/23 0738    Specimen: Blood Updated: 08/07/23 0852     WBC 7.90 10*3/mm3      RBC 3.79 10*6/mm3      Hemoglobin 10.4 g/dL      Hematocrit 31.9 %      MCV 84.2 fL      MCH 27.4 pg      MCHC 32.6 g/dL      RDW 17.3 %      RDW-SD 51.8 fl      MPV 10.4 fL      Platelets 86 10*3/mm3      Neutrophil % 62.9 %      Lymphocyte % 21.9 %      Monocyte % 10.3 %      Eosinophil % 0.6 %      Basophil % 0.1 %      Immature Grans % 4.2 %      Neutrophils, Absolute 4.97 10*3/mm3      Lymphocytes, Absolute 1.73 10*3/mm3      Monocytes, Absolute 0.81 10*3/mm3      Eosinophils, Absolute 0.05 10*3/mm3      Basophils, Absolute 0.01 10*3/mm3      Immature Grans, Absolute 0.33 10*3/mm3      nRBC 1.4 /100 WBC     Basic Metabolic Panel [[id number]]  (Abnormal) Collected: 08/07/23 0738    Specimen: Blood Updated:  08/07/23 0849     Glucose 134 mg/dL      BUN 46 mg/dL      Creatinine 1.56 mg/dL      Sodium 133 mmol/L      Potassium 5.1 mmol/L      Comment: Slight hemolysis detected by analyzer. Results may be affected.        Chloride 102 mmol/L      CO2 21.6 mmol/L      Calcium 8.2 mg/dL      BUN/Creatinine Ratio 29.5     Anion Gap 9.4 mmol/L      eGFR 31.1 mL/min/1.73     Narrative:      GFR Normal >60  Chronic Kidney Disease <60  Kidney Failure <15    The GFR formula is only valid for adults with stable renal function between ages 18 and 70.    POC Glucose Once [[id number]]  (Abnormal) Collected: 08/07/23 0743    Specimen: Blood Updated: 08/07/23 0744     Glucose 154 mg/dL      Comment: Meter: [id number] : [id number] Sarwat JOHNSON       HBV Quant PCR Rfx to Genotype [[id number]] Collected: 08/04/23 1756    Specimen: Blood Updated: 08/06/23 2107     HBV IU/mL HBV DNA not detected IU/mL      log10 HBV IU/mL TNP log10 IU/mL      Comment: Unable to calculate result since non-numeric result obtained for  component test.        Test Information Comment     Comment: The reportable range for this assay is 10 IU/mL to 1 billion IU/mL.        HBV Genotype TNP     Comment: Not indicated       Narrative:      Performed at:  [address] [place] [address]  [id number]  : Gonzales Kang MD, Phone:  [phone]    POC Glucose Once [[id number]]  (Abnormal) Collected: 08/06/23 2056    Specimen: Blood Updated: 08/06/23 2057     Glucose 206 mg/dL      Comment: Meter: [id number] : [id number] Keron JOHNSON       POC Glucose Once [[id number]]  (Abnormal) Collected: 08/06/23 1546    Specimen: Blood Updated: 08/06/23 1547     Glucose 155 mg/dL      Comment: Meter: [id number] : [id number] Beamus Regga'kirti NA             Imaging Results (Last 24 Hours)       Procedure Component Value Units Date/Time    FL Esophagram Complete Double-Contrast [[id number]] Collected: 08/07/23 1139     Updated: 08/07/23  1151    Narrative:      FLUOROSCOPIC ESOPHAGRAM SINGLE CONTRAST     FLUOROSCOPY TIME: 30 seconds, 150 images.     Rapid sequence imaging of the esophagus performed while swallowing thin  liquid barium only. The patient was imaged on the fluoroscopy table in a  semiupright position due to her condition. She cannot pinpoint an  upright position to swallow a barium tablet.     FINDINGS: Located approximately 3 cm below the aissatou there is a 15 mm  smoothly marginated esophageal diverticulum. There was considerable  tertiary wave formation of the distal esophagus causing intraesophageal  reflux and stasis. No aspiration during the examination. The overall  caliber of the esophagus was normal, no tumor or stricture.     The patient supine, no gastroesophageal reflux or hiatal hernia.     CONCLUSION:  1. Esophageal diverticulum.  2. Tertiary wave formation consistent with presbyesophagus, causing  stasis and intraesophageal reflux.     This report was finalized on 8/7/2023 11:41 AM by Dr. Art Fabian M.D.               Current Facility-Administered Medications:     budesonide-formoterol (SYMBICORT) 160-4.5 MCG/ACT inhaler 2 puff, 2 puff, Inhalation, BID, Fred Jeffrey MD, 2 puff at 08/07/23 0900    carvedilol (COREG) tablet 3.125 mg, 3.125 mg, Oral, BID With Meals, Mango Arnold MD, 3.125 mg at 08/07/23 0835    cholecalciferol (VITAMIN D3) tablet 1,000 Units, 1,000 Units, Oral, Daily, Fred Jeffrey MD, 1,000 Units at 08/07/23 0835    gabapentin (NEURONTIN) capsule 600 mg, 600 mg, Oral, Q12H, Fred Jeffrey MD, 600 mg at 08/07/23 0837    guaiFENesin (MUCINEX) 12 hr tablet 600 mg, 600 mg, Oral, Q12H, Mango Arnold MD, 600 mg at 08/07/23 0834    hydrALAZINE (APRESOLINE) tablet 100 mg, 100 mg, Oral, TID, Mango Arnold MD, 100 mg at 08/07/23 0834    HYDROcodone-acetaminophen (NORCO) 7.5-325 MG per tablet 1 tablet, 1 tablet, Oral, Q4H PRN, Mango Arnold MD, 1 tablet at 08/07/23 1238    insulin glargine (LANTUS,  SEMGLEE) injection 36 Units, 36 Units, Subcutaneous, Nightly, Mango Arnold MD, 36 Units at 08/06/23 2201    insulin lispro (HUMALOG/ADMELOG) injection 12 Units, 12 Units, Subcutaneous, TID With Meals, Mango Arnold MD, 12 Units at 08/07/23 1238    insulin lispro (HUMALOG/ADMELOG) injection 2-7 Units, 2-7 Units, Subcutaneous, 4x Daily AC & at Bedtime, Mango Arnold MD, 2 Units at 08/07/23 1239    levothyroxine (SYNTHROID, LEVOTHROID) tablet 25 mcg, 25 mcg, Oral, QAM, Fred Jeffrey MD, 25 mcg at 08/07/23 0634    lidocaine (LIDODERM) 5 % 2 patch, 2 patch, Transdermal, Q24H, Tomer Jeong MD, 2 patch at 08/06/23 0928    LORazepam (ATIVAN) tablet 1 mg, 1 mg, Oral, Q PM, Fred Jeffrey MD, 1 mg at 08/06/23 2011    methylPREDNISolone sodium succinate (SOLU-Medrol) injection 40 mg, 40 mg, Intravenous, Daily, Tomer Jeong MD, 40 mg at 08/07/23 0835    montelukast (SINGULAIR) tablet 10 mg, 10 mg, Oral, Nightly, Fred Jeffrey MD, 10 mg at 08/06/23 2011    ondansetron (ZOFRAN) injection 4 mg, 4 mg, Intravenous, Q6H PRN, Fred Jeffrey MD, 4 mg at 08/01/23 2044    pantoprazole (PROTONIX) EC tablet 40 mg, 40 mg, Oral, Q AM, Fred Jeffrey MD, 40 mg at 08/07/23 0634    sennosides-docusate (PERICOLACE) 8.6-50 MG per tablet 2 tablet, 2 tablet, Oral, BID, Mango Arnold MD, 2 tablet at 08/07/23 0835    tamsulosin (FLOMAX) 24 hr capsule 0.4 mg, 0.4 mg, Oral, Daily, Fred Jeffrey MD, 0.4 mg at 08/07/23 0834    valACYclovir (VALTREX) tablet 1,000 mg, 1,000 mg, Oral, Q24H, Tomer Jeong MD, 1,000 mg at 08/07/23 0848     ASSESSMENT  Chronic ITP   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  Dysphagia  Chronic anemia  Congestive heart failure  Diabetes mellitus  Hypertension  Hypothyroidism  Paroxysmal atrial fibrillation  Pulmonary hypertension  Aortic stenosis  Degenerative disc disease  Compression fracture L5 vertebra  Chronic kidney disease stage IV  Chronic kidney retention  with Hoffman catheter    PLAN  CPM  Continue IV steroids and IVIG per hematology  Esophagogram today  GI and hematology to follow patient  Adjust home medications  Stress ulcer DVT prophylaxis  Supportive care  PT OT  DNR  Discussed with family nursing staff and hematology  Follow closely and further recommendation current hospital course    KADEEM RIVAS MD    Copied text in this note has been reviewed and is accurate as of 08/07/23

## 2023-08-08 LAB
ANION GAP SERPL CALCULATED.3IONS-SCNC: 8.8 MMOL/L (ref 5–15)
BASOPHILS # BLD MANUAL: 0.1 10*3/MM3 (ref 0–0.2)
BASOPHILS NFR BLD MANUAL: 1.3 % (ref 0–1.5)
BUN SERPL-MCNC: 44 MG/DL (ref 8–23)
BUN/CREAT SERPL: 32.4 (ref 7–25)
CALCIUM SPEC-SCNC: 8.4 MG/DL (ref 8.2–9.6)
CHLORIDE SERPL-SCNC: 104 MMOL/L (ref 98–107)
CO2 SERPL-SCNC: 23.2 MMOL/L (ref 22–29)
CREAT SERPL-MCNC: 1.36 MG/DL (ref 0.57–1)
DEPRECATED RDW RBC AUTO: 52.5 FL (ref 37–54)
EGFRCR SERPLBLD CKD-EPI 2021: 36.6 ML/MIN/1.73
ERYTHROCYTE [DISTWIDTH] IN BLOOD BY AUTOMATED COUNT: 17.5 % (ref 12.3–15.4)
GLUCOSE BLDC GLUCOMTR-MCNC: 139 MG/DL (ref 70–130)
GLUCOSE BLDC GLUCOMTR-MCNC: 165 MG/DL (ref 70–130)
GLUCOSE BLDC GLUCOMTR-MCNC: 211 MG/DL (ref 70–130)
GLUCOSE BLDC GLUCOMTR-MCNC: 211 MG/DL (ref 70–130)
GLUCOSE SERPL-MCNC: 155 MG/DL (ref 65–99)
HCT VFR BLD AUTO: 30.6 % (ref 34–46.6)
HGB BLD-MCNC: 10 G/DL (ref 12–15.9)
LYMPHOCYTES # BLD MANUAL: 0.73 10*3/MM3 (ref 0.7–3.1)
LYMPHOCYTES NFR BLD MANUAL: 3.8 % (ref 5–12)
MCH RBC QN AUTO: 27.5 PG (ref 26.6–33)
MCHC RBC AUTO-ENTMCNC: 32.7 G/DL (ref 31.5–35.7)
MCV RBC AUTO: 84.3 FL (ref 79–97)
METAMYELOCYTES NFR BLD MANUAL: 1.3 % (ref 0–0)
MONOCYTES # BLD: 0.31 10*3/MM3 (ref 0.1–0.9)
MYELOCYTES NFR BLD MANUAL: 2.6 % (ref 0–0)
NEUTROPHILS # BLD AUTO: 6.63 10*3/MM3 (ref 1.7–7)
NEUTROPHILS NFR BLD MANUAL: 82.1 % (ref 42.7–76)
PLAT MORPH BLD: NORMAL
PLATELET # BLD AUTO: 88 10*3/MM3 (ref 140–450)
PMV BLD AUTO: 9.9 FL (ref 6–12)
POTASSIUM SERPL-SCNC: 4.7 MMOL/L (ref 3.5–5.2)
RBC # BLD AUTO: 3.63 10*6/MM3 (ref 3.77–5.28)
RBC MORPH BLD: NORMAL
SODIUM SERPL-SCNC: 136 MMOL/L (ref 136–145)
VARIANT LYMPHS NFR BLD MANUAL: 9 % (ref 19.6–45.3)
WBC MORPH BLD: NORMAL
WBC NRBC COR # BLD: 8.07 10*3/MM3 (ref 3.4–10.8)

## 2023-08-08 PROCEDURE — 94799 UNLISTED PULMONARY SVC/PX: CPT

## 2023-08-08 PROCEDURE — 82948 REAGENT STRIP/BLOOD GLUCOSE: CPT

## 2023-08-08 PROCEDURE — 85025 COMPLETE CBC W/AUTO DIFF WBC: CPT | Performed by: INTERNAL MEDICINE

## 2023-08-08 PROCEDURE — 92610 EVALUATE SWALLOWING FUNCTION: CPT

## 2023-08-08 PROCEDURE — 63710000001 INSULIN GLARGINE PER 5 UNITS: Performed by: HOSPITALIST

## 2023-08-08 PROCEDURE — 99232 SBSQ HOSP IP/OBS MODERATE 35: CPT | Performed by: INTERNAL MEDICINE

## 2023-08-08 PROCEDURE — 85007 BL SMEAR W/DIFF WBC COUNT: CPT | Performed by: INTERNAL MEDICINE

## 2023-08-08 PROCEDURE — 97530 THERAPEUTIC ACTIVITIES: CPT

## 2023-08-08 PROCEDURE — 63710000001 INSULIN LISPRO (HUMAN) PER 5 UNITS: Performed by: HOSPITALIST

## 2023-08-08 PROCEDURE — 97535 SELF CARE MNGMENT TRAINING: CPT

## 2023-08-08 PROCEDURE — 94664 DEMO&/EVAL PT USE INHALER: CPT

## 2023-08-08 PROCEDURE — 80048 BASIC METABOLIC PNL TOTAL CA: CPT | Performed by: HOSPITALIST

## 2023-08-08 PROCEDURE — 63710000001 PREDNISONE PER 1 MG: Performed by: INTERNAL MEDICINE

## 2023-08-08 PROCEDURE — 97110 THERAPEUTIC EXERCISES: CPT

## 2023-08-08 RX ORDER — BISACODYL 10 MG
10 SUPPOSITORY, RECTAL RECTAL DAILY PRN
Status: DISCONTINUED | OUTPATIENT
Start: 2023-08-08 | End: 2023-08-10

## 2023-08-08 RX ADMIN — TAMSULOSIN HYDROCHLORIDE 0.4 MG: 0.4 CAPSULE ORAL at 11:33

## 2023-08-08 RX ADMIN — GABAPENTIN 600 MG: 300 CAPSULE ORAL at 09:14

## 2023-08-08 RX ADMIN — LEVOTHYROXINE SODIUM 25 MCG: 0.03 TABLET ORAL at 05:53

## 2023-08-08 RX ADMIN — HYDROCODONE BITARTRATE AND ACETAMINOPHEN 1 TABLET: 7.5; 325 TABLET ORAL at 21:02

## 2023-08-08 RX ADMIN — INSULIN LISPRO 3 UNITS: 100 INJECTION, SOLUTION INTRAVENOUS; SUBCUTANEOUS at 22:26

## 2023-08-08 RX ADMIN — POLYETHYLENE GLYCOL 3350 17 G: 17 POWDER, FOR SOLUTION ORAL at 09:17

## 2023-08-08 RX ADMIN — Medication 1000 UNITS: at 09:14

## 2023-08-08 RX ADMIN — HYDRALAZINE HYDROCHLORIDE 100 MG: 50 TABLET, FILM COATED ORAL at 21:02

## 2023-08-08 RX ADMIN — CARVEDILOL 3.12 MG: 3.12 TABLET, FILM COATED ORAL at 17:38

## 2023-08-08 RX ADMIN — GABAPENTIN 600 MG: 300 CAPSULE ORAL at 21:02

## 2023-08-08 RX ADMIN — PREDNISONE 20 MG: 20 TABLET ORAL at 09:15

## 2023-08-08 RX ADMIN — PANTOPRAZOLE SODIUM 40 MG: 40 TABLET, DELAYED RELEASE ORAL at 05:53

## 2023-08-08 RX ADMIN — GUAIFENESIN 600 MG: 600 TABLET, EXTENDED RELEASE ORAL at 21:02

## 2023-08-08 RX ADMIN — HYDROCODONE BITARTRATE AND ACETAMINOPHEN 1 TABLET: 7.5; 325 TABLET ORAL at 14:55

## 2023-08-08 RX ADMIN — GUAIFENESIN 600 MG: 600 TABLET, EXTENDED RELEASE ORAL at 09:15

## 2023-08-08 RX ADMIN — CARVEDILOL 3.12 MG: 3.12 TABLET, FILM COATED ORAL at 09:15

## 2023-08-08 RX ADMIN — HYDRALAZINE HYDROCHLORIDE 100 MG: 50 TABLET, FILM COATED ORAL at 09:17

## 2023-08-08 RX ADMIN — HYDROCODONE BITARTRATE AND ACETAMINOPHEN 1 TABLET: 7.5; 325 TABLET ORAL at 09:20

## 2023-08-08 RX ADMIN — MONTELUKAST SODIUM 10 MG: 10 TABLET, FILM COATED ORAL at 21:02

## 2023-08-08 RX ADMIN — INSULIN LISPRO 12 UNITS: 100 INJECTION, SOLUTION INTRAVENOUS; SUBCUTANEOUS at 11:33

## 2023-08-08 RX ADMIN — HYDROCODONE BITARTRATE AND ACETAMINOPHEN 1 TABLET: 7.5; 325 TABLET ORAL at 03:29

## 2023-08-08 RX ADMIN — BUDESONIDE AND FORMOTEROL FUMARATE DIHYDRATE 2 PUFF: 160; 4.5 AEROSOL RESPIRATORY (INHALATION) at 09:37

## 2023-08-08 RX ADMIN — INSULIN LISPRO 3 UNITS: 100 INJECTION, SOLUTION INTRAVENOUS; SUBCUTANEOUS at 11:33

## 2023-08-08 RX ADMIN — INSULIN GLARGINE 36 UNITS: 100 INJECTION, SOLUTION SUBCUTANEOUS at 22:26

## 2023-08-08 RX ADMIN — LORAZEPAM 1 MG: 1 TABLET ORAL at 21:02

## 2023-08-08 RX ADMIN — VALACYCLOVIR HYDROCHLORIDE 1000 MG: 500 TABLET, FILM COATED ORAL at 11:33

## 2023-08-08 RX ADMIN — INSULIN LISPRO 12 UNITS: 100 INJECTION, SOLUTION INTRAVENOUS; SUBCUTANEOUS at 17:40

## 2023-08-08 RX ADMIN — INSULIN LISPRO 2 UNITS: 100 INJECTION, SOLUTION INTRAVENOUS; SUBCUTANEOUS at 17:41

## 2023-08-08 RX ADMIN — Medication 1000 MCG: at 11:33

## 2023-08-08 RX ADMIN — BUDESONIDE AND FORMOTEROL FUMARATE DIHYDRATE 2 PUFF: 160; 4.5 AEROSOL RESPIRATORY (INHALATION) at 19:51

## 2023-08-08 RX ADMIN — HYDRALAZINE HYDROCHLORIDE 100 MG: 50 TABLET, FILM COATED ORAL at 17:38

## 2023-08-08 RX ADMIN — INSULIN LISPRO 12 UNITS: 100 INJECTION, SOLUTION INTRAVENOUS; SUBCUTANEOUS at 09:03

## 2023-08-08 RX ADMIN — SENNOSIDES AND DOCUSATE SODIUM 2 TABLET: 50; 8.6 TABLET ORAL at 09:14

## 2023-08-08 NOTE — PLAN OF CARE
Goal Outcome Evaluation:      Patient is alert. Room air. Pain being managed per MAR. Large bowel movement this shift. BS managed per MAR. Possible d/c tomorrow

## 2023-08-08 NOTE — CASE MANAGEMENT/SOCIAL WORK
Continued Stay Note  [facility]     Patient Name: Helena Carmen  MRN: [id number]  Today's Date: 8/8/2023    Admit Date: 7/31/2023    Plan: Home with VNA and family   Discharge Plan       Row Name 08/08/23 1449       Plan    Plan Home with VNA and family    Patient/Family in Agreement with Plan yes    Plan Comments Spoke with patient and family at bedside. Patient's plan is to return home with family and VNA for HH.                   Discharge Codes    No documentation.                 Expected Discharge Date and Time       Expected Discharge Date Expected Discharge Time    Aug 9, 2023               Beth Gao RN

## 2023-08-08 NOTE — THERAPY TREATMENT NOTE
Patient Name: Helena Carmen  : 1931    MRN: [id number]                              Today's Date: 2023       Admit Date: 2023    Visit Dx:     ICD-10-CM ICD-9-CM   1. Uncontrolled hypertension  I10 401.9   2. Thrombocytopenia  D69.6 287.5   3. Hyponatremia  E87.1 276.1   4. History of ITP  Z86.2 V12.3   5. Rash (left flank)  R21 782.1     Patient Active Problem List   Diagnosis    Aortic valve stenosis    Fatigue    MI (mitral incompetence)    PVC (premature ventricular contraction)    Legally blind    Essential hypertension    Hypertriglyceridemia    Pulmonary hypertension    Paroxysmal atrial fibrillation    Anxiety    Stage 4 chronic kidney disease    Chronic pain disorder    Degeneration of lumbar intervertebral disc    Type 2 diabetes mellitus with hyperglycemia    Esophageal reflux    Gastroparesis    Hiatal hernia    Iatrogenic hypothyroidism    Macular degeneration    Malignant neoplasm of uterus    Osteoarthritis of knee    Peripheral nerve disease    Secondary hyperparathyroidism    Thrombocytopenia    Chronic heart failure with preserved ejection fraction    Other cirrhosis of liver    Hypothyroidism    Nonrheumatic tricuspid valve regurgitation    Anemia, chronic disease    Hyponatremia    Closed fracture of fifth lumbar vertebra    Closed fracture of fifth lumbar vertebra, unspecified fracture morphology, initial encounter    Diabetic polyneuropathy associated with type 2 diabetes mellitus    Chronic ITP (idiopathic thrombocytopenia)    Chronic midline low back pain with bilateral sciatica    Acute deep vein thrombosis of calf, bilateral    Compression fracture of L5 vertebra with routine healing    Acute left-sided low back pain with left-sided sciatica    Urine retention    Rib fracture    Acute on chronic combined systolic and diastolic congestive heart failure    Acute UTI    Blindness right eye category 3, blindness left eye category 3    Cognitive communication deficit     Pseudomonas (aeruginosa) (mallei) (pseudomallei) as the cause of diseases classified elsewhere    Multifocal pneumonia     Past Medical History:   Diagnosis Date    Aortic valve stenosis     s/p tissue AVR    Back pain     CKD (chronic kidney disease)     Colitis due to Clostridioides difficile 01/26/2022    Diastolic dysfunction     Grade 2 per echocardiogram 2013    Diverticulosis     Exertional shortness of breath     Heart disease     Hiatal hernia     Hyperlipidemia     Hypertension     Hyperthyroidism     Hypertriglyceridemia 05/31/2018    Hypothyroidism     L5 compression fracture (HCC) 08/2022    Left ventricular hypertrophy     Legally blind     Liver disease     Low back pain     Macular degeneration     Mitral regurgitation     Osteoarthritis of hip     Osteoporosis     Pancreatitis 01/26/2022    Paroxysmal atrial fibrillation     Peripheral neuropathy     Premature ventricular contractions     Pulmonary hypertension     Renal insufficiency syndrome     Type 2 diabetes mellitus     Uterine cancer      Past Surgical History:   Procedure Laterality Date    AORTIC VALVE REPAIR/REPLACEMENT      APPENDECTOMY      CATARACT EXTRACTION      1970, 1999    CHOLECYSTECTOMY      COLONOSCOPY      ENDOSCOPY  08/15/2014    no gross lesions in stomach/duodenum, erythrematous mucosa in stomach    EPIDURAL BLOCK      HYSTERECTOMY  2007    STERNOTOMY      UPPER GASTROINTESTINAL ENDOSCOPY        General Information       Row Name 08/08/23 1019          Physical Therapy Time and Intention    Document Type therapy note (daily note) (P)   -SK     Mode of Treatment individual therapy;physical therapy (P)   -SK       Row Name 08/08/23 1019          General Information    Patient Profile Reviewed yes (P)   -SK     Existing Precautions/Restrictions fall (P)   -SK     Barriers to Rehab visual deficit;impaired sensation (P)   neuropathy  -SK       Row Name 08/08/23 1019          Living Environment    People in Home child(edison),  adult;other relative(s) (P)   -SK       Row Name 08/08/23 1019          Cognition    Orientation Status (Cognition) oriented to;person;place;situation (P)   -SK       Row Name 08/08/23 1019          Safety Issues, Functional Mobility    Impairments Affecting Function (Mobility) balance;endurance/activity tolerance;pain;strength;postural/trunk control (P)   -SK     Comment, Safety Issues/Impairments (Mobility) nonskid socks and gait belt donned. (P)   -SK               User Key  (r) = Recorded By, (t) = Taken By, (c) = Cosigned By      Initials Name Provider Type    Jocy Olivera, PT Student PT Student                   Mobility       Row Name 08/08/23 1021          Sit-Stand Transfer    Sit-Stand [place] (Transfers) minimum assist (75% patient effort);verbal cues;contact guard;1 person assist (P)   -SK     Assistive Device (Sit-Stand Transfers) walker, front-wheeled (P)   -SK     Comment, (Sit-Stand Transfer) Min Ax1/CGA for STS; VC for hand placement. Pt with significant forward flexed posture. (P)   -SK       Row Name 08/08/23 1021          Gait/Stairs (Locomotion)    [place] Level (Gait) contact guard;minimum assist (75% patient effort);other (see comments);verbal cues;1 person assist (P)   minAx1; only when fatigued.  -SK     Assistive Device (Gait) walker, front-wheeled (P)   -SK     Distance in Feet (Gait) 3' forward and back (P)   -SK     Deviations/Abnormal Patterns (Gait) base of support, narrow;gait speed decreased;stride length decreased;weight shifting decreased (P)   -SK     Bilateral Gait Deviations forward flexed posture (P)   -SK     Comment, (Gait/Stairs) Significant forward flexed posture; fatigue limiting. (P)   -SK               User Key  (r) = Recorded By, (t) = Taken By, (c) = Cosigned By      Initials Name Provider Type    Jocy Olivera, PT Student PT Student                   Obj/Interventions       Row Name 08/08/23 1024          Motor Skills    Therapeutic Exercise other  (see comments) (P)   Bilat 1x10: LAG, knee marches, glute squeezes at EOC.  -SK       Row Name 08/08/23 1024          Balance    Sit to Stand Dynamic Balance verbal cues;contact guard (P)   -SK     Static Standing Balance verbal cues;contact guard (P)   -SK     Dynamic Standing Balance contact guard;minimal assist (P)   -SK     Position/Device Used, Standing Balance supported;walker, front-wheeled (P)   -SK     Comment, Balance No overt LOB' pt demonstrates mild unsteadiness. (P)   -SK               User Key  (r) = Recorded By, (t) = Taken By, (c) = Cosigned By      Initials Name Provider Type    SK Jocy Modi, PT Student PT Student                   Goals/Plan    No documentation.                  Clinical Impression       Row Name 08/08/23 1027          Pain    Pain Location - back (P)   -SK     Pre/Posttreatment Pain Comment Pt stated she had pain in her back, but did not rate. (P)   -SK     Pain Intervention(s) Repositioned;Ambulation/increased activity (P)   -SK       Row Name 08/08/23 1027          Plan of Care Review    Plan of Care Reviewed With patient (P)   -SK     Progress improving (P)   -SK     Outcome Evaluation Pt seen for PT treatment this AM. Pt sitting in chair upon arrival. CGAx1 for STS transfer requring VC for hand placement. CGAx1/minAx1 for ambulating 3' forward and back- fatigue limiting. Pt demonstrates a significant forward flexed posture in standing with VC for posture correction, decreased gait speed, decreased step length, and decreased endurance. Pt will continue to benefit from skilled PT interventions to address overall functional mobility and safety. Rec SNF or home with home health and 24 hour assist. (P)   -SK       Row Name 08/08/23 1027          Therapy Assessment/Plan (PT)    Rehab Potential (PT) good, to achieve stated therapy goals (P)   -SK     Criteria for Skilled Interventions Met (PT) yes (P)   -SK     Therapy Frequency (PT) 5 times/wk (P)   -SK       Row Name 08/08/23  1027          Positioning and Restraints    Pre-Treatment Position sitting in chair/recliner (P)   -SK     Post Treatment Position chair (P)   -SK     In Chair notified nsg;reclined;call light within reach;encouraged to call for assist;exit alarm on (P)   -SK               User Key  (r) = Recorded By, (t) = Taken By, (c) = Cosigned By      Initials Name Provider Type    Jocy Olivera, PT Student PT Student                   Outcome Measures       Row Name 08/08/23 1033 08/08/23 0920       How much help from another person do you currently need...    Turning from your back to your side while in flat bed without using bedrails? 3 (P)   -SK 3  -DH    Moving from lying on back to sitting on the side of a flat bed without bedrails? 2 (P)   -SK 2  -DH    Moving to and from a bed to a chair (including a wheelchair)? 3 (P)   -SK 3  -DH    Standing up from a chair using your arms (e.g., wheelchair, bedside chair)? 3 (P)   -SK 3  -DH    Climbing 3-5 steps with a railing? 1 (P)   -SK 2  -DH    To walk in hospital room? 3 (P)   -SK 3  -DH    AM-PAC 6 Clicks Score (PT) 15 (P)   -SK 16  -DH    Highest level of mobility 4 --> Transferred to chair/commode (P)   -SK 5 --> Static standing  -DH      Row Name 08/08/23 1033          Functional Assessment    Outcome Measure Options AM-PAC 6 Clicks Basic Mobility (PT) (P)   -SK               User Key  (r) = Recorded By, (t) = Taken By, (c) = Cosigned By      Initials Name Provider Type     Dorinda Miller, RN Registered Nurse    Jocy Olivera, PT Student PT Student                                 Physical Therapy Education       Title: PT OT SLP Therapies (In Progress)       Topic: Physical Therapy (Done)       Point: Mobility training (Done)       Learning Progress Summary             Patient Acceptance, E,TB, VU,NR by SK at 8/8/2023 1035    Acceptance, E, VU by ROSARIO at 8/7/2023 1526    Acceptance, E,TB, VU,NR by SK at 8/4/2023 1452    Acceptance, E,TB, VU,NR by SK at 8/2/2023  1534                         Point: Home exercise program (Done)       Learning Progress Summary             Patient Acceptance, E,TB, VU,NR by SK at 8/8/2023 1035                         Point: Body mechanics (Done)       Learning Progress Summary             Patient Acceptance, E,TB, VU,NR by SK at 8/8/2023 1035    Acceptance, E,TB, VU,NR by SK at 8/4/2023 1452    Acceptance, E,TB, VU,NR by SK at 8/2/2023 1534                         Point: Precautions (Done)       Learning Progress Summary             Patient Acceptance, E,TB, VU,NR by SK at 8/8/2023 1035    Acceptance, E,TB, VU,NR by SK at 8/4/2023 1452    Acceptance, E,TB, VU,NR by SK at 8/2/2023 1534                                         User Key       Initials Effective Dates Name Provider Type Discipline     12/13/22 -  Trudy Mason, PT Physical Therapist PT    SK 07/28/23 -  Jocy Modi, CANDY Student PT Student PT                  PT Recommendation and Plan  Planned Therapy Interventions (PT): bed mobility training, gait training, balance training, patient/family education, postural re-education, strengthening, transfer training  Plan of Care Reviewed With: (P) patient  Progress: (P) improving  Outcome Evaluation: (P) Pt seen for PT treatment this AM. Pt sitting in chair upon arrival. CGAx1 for STS transfer requring VC for hand placement. CGAx1/minAx1 for ambulating 3' forward and back- fatigue limiting. Pt demonstrates a significant forward flexed posture in standing with VC for posture correction, decreased gait speed, decreased step length, and decreased endurance. Pt will continue to benefit from skilled PT interventions to address overall functional mobility and safety. Rec SNF or home with home health and 24 hour assist.     Time Calculation:         PT Charges       Row Name 08/08/23 1156             Time Calculation    Start Time 0958 (P)   -SK      Stop Time 1015 (P)   -SK      Time Calculation (min) 17 min (P)   -SK      PT Received On  08/08/23 (P)   -SK      PT - Next Appointment 08/09/23 (P)   -SK         Time Calculation- PT    Total Timed Code Minutes- PT 17 minute(s) (P)   -SK         Timed Charges    96884 - PT Therapeutic Exercise Minutes 10 (P)   -SK      13085 - PT Therapeutic Activity Minutes 7 (P)   -SK         Total Minutes    Timed Charges Total Minutes 17 (P)   -SK       Total Minutes 17 (P)   -SK                User Key  (r) = Recorded By, (t) = Taken By, (c) = Cosigned By      Initials Name Provider Type    Jocy Olivera, PT Student PT Student                  Therapy Charges for Today       Code Description Service Date Service Provider Modifiers Qty    [id number] HC PT THER PROC EA 15 MIN 8/8/2023 Jocy Modi, PT Student GP 1            PT G-Codes  Outcome Measure Options: (P) AM-PAC 6 Clicks Basic Mobility (PT)  AM-PAC 6 Clicks Score (PT): (P) 15  AM-PAC 6 Clicks Score (OT): 14  PT Discharge Summary  Anticipated Discharge Disposition (PT): (P) skilled nursing facility, home with assist, home with 24/7 care, home with home health  Discharge Destination: (P) Home with assist, Home with home health, SNF    Jocy Modi PT Student  8/8/2023

## 2023-08-08 NOTE — THERAPY DISCHARGE NOTE
Acute Care - Speech Language Pathology   Swallow Treatment Note/Discharge [facility]     Patient Name: Helena Carmen  : 1931  MRN: [id number]  Today's Date: 2023               Admit Date: 2023    Visit Dx:    ICD-10-CM ICD-9-CM   1. Uncontrolled hypertension  I10 401.9   2. Thrombocytopenia  D69.6 287.5   3. Hyponatremia  E87.1 276.1   4. History of ITP  Z86.2 V12.3   5. Rash (left flank)  R21 782.1     Patient Active Problem List   Diagnosis    Aortic valve stenosis    Fatigue    MI (mitral incompetence)    PVC (premature ventricular contraction)    Legally blind    Essential hypertension    Hypertriglyceridemia    Pulmonary hypertension    Paroxysmal atrial fibrillation    Anxiety    Stage 4 chronic kidney disease    Chronic pain disorder    Degeneration of lumbar intervertebral disc    Type 2 diabetes mellitus with hyperglycemia    Esophageal reflux    Gastroparesis    Hiatal hernia    Iatrogenic hypothyroidism    Macular degeneration    Malignant neoplasm of uterus    Osteoarthritis of knee    Peripheral nerve disease    Secondary hyperparathyroidism    Thrombocytopenia    Chronic heart failure with preserved ejection fraction    Other cirrhosis of liver    Hypothyroidism    Nonrheumatic tricuspid valve regurgitation    Anemia, chronic disease    Hyponatremia    Closed fracture of fifth lumbar vertebra    Closed fracture of fifth lumbar vertebra, unspecified fracture morphology, initial encounter    Diabetic polyneuropathy associated with type 2 diabetes mellitus    Chronic ITP (idiopathic thrombocytopenia)    Chronic midline low back pain with bilateral sciatica    Acute deep vein thrombosis of calf, bilateral    Compression fracture of L5 vertebra with routine healing    Acute left-sided low back pain with left-sided sciatica    Urine retention    Rib fracture    Acute on chronic combined systolic and diastolic congestive heart failure    Acute UTI    Blindness right eye category 3,  blindness left eye category 3    Cognitive communication deficit    Pseudomonas (aeruginosa) (mallei) (pseudomallei) as the cause of diseases classified elsewhere    Multifocal pneumonia     Past Medical History:   Diagnosis Date    Aortic valve stenosis     s/p tissue AVR    Back pain     CKD (chronic kidney disease)     Colitis due to Clostridioides difficile 01/26/2022    Diastolic dysfunction     Grade 2 per echocardiogram 2013    Diverticulosis     Exertional shortness of breath     Heart disease     Hiatal hernia     Hyperlipidemia     Hypertension     Hyperthyroidism     Hypertriglyceridemia 05/31/2018    Hypothyroidism     L5 compression fracture (HCC) 08/2022    Left ventricular hypertrophy     Legally blind     Liver disease     Low back pain     Macular degeneration     Mitral regurgitation     Osteoarthritis of hip     Osteoporosis     Pancreatitis 01/26/2022    Paroxysmal atrial fibrillation     Peripheral neuropathy     Premature ventricular contractions     Pulmonary hypertension     Renal insufficiency syndrome     Type 2 diabetes mellitus     Uterine cancer      Past Surgical History:   Procedure Laterality Date    AORTIC VALVE REPAIR/REPLACEMENT      APPENDECTOMY      CATARACT EXTRACTION      1970, 1999    CHOLECYSTECTOMY      COLONOSCOPY      ENDOSCOPY  08/15/2014    no gross lesions in stomach/duodenum, erythrematous mucosa in stomach    EPIDURAL BLOCK      HYSTERECTOMY  2007    STERNOTOMY      UPPER GASTROINTESTINAL ENDOSCOPY         SLP Recommendation and Plan                    Anticipated Discharge Disposition (SLP): unknown (08/08/23 1010)                    Anticipated Discharge Disposition (SLP): unknown (08/08/23 1010)           Reason for Discharge: all goals and outcomes met, no further needs identified (08/08/23 1010)                     SWALLOW EVALUATION (last 72 hours)       SLP Adult Swallow Evaluation       Row Name 08/08/23 0945 08/05/23 1300                Rehab Evaluation     Document Type therapy note (daily note)  -CB therapy note (daily note)  -TH       Subjective Information no complaints  -CB no complaints  -TH       Patient Observations alert;cooperative;agree to therapy  -CB cooperative;alert;agree to therapy  -TH       Patient/Family/Caregiver Comments/Observations -- son and granddaughter present  -TH       Patient Effort good  -CB good  -TH          General Information    Patient Profile Reviewed yes  -CB yes  -TH       Pertinent History Of Current Problem -- 89-year-old white female with very complex past medical history and well-known to our service including ITP chronic low back pain with L5 vertebral body fracture degenerative disease paroxysmal atrial fibrillation DVT diabetes hypertension hyperlipidemia hypothyroidism congestive heart failure and chronic kidney disease stage III who lives at home with her daughter brought to the emergency room with generalized weakness and bruises all over. Patient work-up in ER revealed worsening thrombocytopenia admitted for management.  -TH       Current Method of Nutrition -- NPO  -TH       Precautions/Limitations, Vision -- WFL;for purposes of eval  -TH       Precautions/Limitations, Hearing -- WFL;for purposes of eval  -TH       Prior Level of Function-Communication -- WFL  -TH       Prior Level of Function-Swallowing -- no diet consistency restrictions;esophageal concerns  avoids dry foods  -TH       Plans/Goals Discussed with -- patient and family;agreed upon  -TH       Barriers to Rehab -- none identified  -TH          Oral Musculature and Cranial Nerve Assessment    Oral Motor General Assessment -- lingual impairment  -TH       Lingual Impairment, Detail. Cranial Nerves IX, XII (Glossopharyngeal and Hypoglossal) -- --  patient reports of decreased sensation  -TH          General Eating/Swallowing Observations    Respiratory Support Currently in Use -- room air  -TH       Eating/Swallowing Skills -- self-fed;fed by SLP  -TH        "Positioning During Eating -- upright in bed  -TH       Utensils Used -- spoon;cup;straw  -TH       Consistencies Trialed -- regular textures;soft to chew textures;mixed consistency;pureed;ice chips;thin liquids  -TH          Clinical Swallow Eval    Clinical Swallow Evaluation Summary -- Patient seen for bedside swallow evaluation on this date. Patient and son were present upon SLP arrival and her granddaughter arrived later in the session. Mrs. Carmen reports that she has issues with "food coming back up and getting sick when she eats/drinks"; she also says "it feels like it gets stuck right here" pointing to her stomach "then comes back up." Patient reports she does avoid dry foods. Her granddaughter reports she has polyps and a goiter, but do not see any reports of this. Patient has orders for an esophagram, however this has not been completed yet. RN reports this will take place on Monday. She is currently not on a diet. Completed trials of ice chips, thin liquids, pureed, soft solids/mixed, and solids. She tolerated ice chips without any difficulty; she also tolerated thin liquids via tsp without any problems however, when taking large sips via cup she presented with coughing/throat clearing. When attempting small sips via straw and cup this appeared to eliminate s/sx of aspiration. She tolerated soft solids/mixed and pureed without any s/sx of aspiration. Adequate mastication and timely swallow observed. Clear vocal quality noted throughout session. No reports of globus sensation and she did not get sick or report of nausea during session. At this time recommend patient is on a soft/slick diet with thin liquids with upright position for all PO, slow rate, small bites/sips, alternating bites/sips and medications whole as tolerated. Recommend SLP follow up for diet tolerance.  -TH          SLP Evaluation Clinical Impression    SLP Swallowing Diagnosis -- suspected esophageal dysphagia  -TH       Functional " Impact -- risk of aspiration/pneumonia  -TH       Rehab Potential/Prognosis, Swallowing -- good, to achieve stated therapy goals  -TH       Swallow Criteria for Skilled Therapeutic Interventions Met -- demonstrates skilled criteria  -TH          SLP Treatment Clinical Impressions    Care Plan Review -- evaluation/treatment results reviewed;care plan/treatment goals reviewed  -TH       Care Plan Review, Other Participant(s) -- son  -TH          Recommendations    Therapy Frequency (Swallow) -- PRN  -TH       Predicted Duration Therapy Intervention (Days) -- until discharge  -TH       SLP Diet Recommendation -- soft to chew textures;ground;thin liquids  avoid dry foods  -TH       Recommended Diagnostics -- --  f/u for DT  -TH       Recommended Precautions and Strategies -- upright posture during/after eating;small bites of food and sips of liquid;general aspiration precautions;reflux precautions;alternate between small bites of food and sips of liquid  -TH       Oral Care Recommendations -- Oral Care before breakfast, after meals and PRN;Oral Care BID/PRN  -TH       SLP Rec. for Method of Medication Administration -- as tolerated  -TH       Monitor for Signs of Aspiration -- yes;notify SLP if any concerns  -TH       Anticipated Discharge Disposition (SLP) -- unknown  -TH          Swallow Goals (SLP)    Swallow LTGs -- Patient will demonstrate functional swallow for  -TH       Swallow STGs -- diet tolerance goal selection (SLP)  -TH       Diet Tolerance Goal Selection (SLP) -- Other (see comments);Swallow Short Term Goal 1  -TH          (LTG) Patient will demonstrate functional swallow for    Diet Texture (Demonstrate functional swallow) -- regular textures  -TH       Liquid viscosity (Demonstrate functional swallow) -- thin liquids  -TH       Alfalfa (Demonstrate functional swallow) -- independently (over 90% accuracy)  -TH       Time Frame (Demonstrate functional swallow) -- by discharge  -TH          (STG)  Swallow 1    (STG) Swallow 1 -- Patient will demonstrate use of esophogeal precautions/strategies independently to reduce s/sx .  -TH       [place] (Swallow Short Term Goal 1) -- independently (over 90% accuracy)  -TH       Time Frame (Swallow Short Term Goal 1) -- by discharge  -TH                 User Key  (r) = Recorded By, (t) = Taken By, (c) = Cosigned By      Initials Name Effective Dates    TH Maldonado, Madelin 06/16/21 -     CB Larissa Cantu CCC-SLP 06/01/23 -                     EDUCATION  The patient has been educated in the following areas:   Dysphagia (Swallowing Impairment).         SLP GOALS       Row Name 08/08/23 0945 08/05/23 1300          (LTG) Patient will demonstrate functional swallow for    Diet Texture (Demonstrate functional swallow) soft to chew (chopped) textures  -CB regular textures  -TH     Liquid viscosity (Demonstrate functional swallow) thin liquids  -CB thin liquids  -TH     [place] (Demonstrate functional swallow) independently (over 90% accuracy)  -CB independently (over 90% accuracy)  -TH     Time Frame (Demonstrate functional swallow) by discharge  -CB by discharge  -TH     Progress/Outcomes (Demonstrate functional swallow) goal met  -CB --     Comment (Demonstrate functional swallow) Pt reports no issues w/current diet of soft/slick, no issues with esophageal swallow, EGD demonstrated esophageal diverticulum and presbyesophagus.  Pt and family report no issues since diet change, daughter reports issues w/meat only.  Encouraged to discuss w/RD possible changes to diet selection to assist with protein choices.  Pt is appropriate for discharge from  at this time, please reconsult, if needed.  Patient and family in agreement.  -CB --        (STG) Swallow 1    (STG) Swallow 1 -- Patient will demonstrate use of esophogeal precautions/strategies independently to reduce s/sx .  -TH     [place] (Swallow Short Term Goal 1) -- independently (over 90% accuracy)  -TH     Time  Frame (Swallow Short Term Goal 1) -- by discharge  -               User Key  (r) = Recorded By, (t) = Taken By, (c) = Cosigned By      Initials Name Provider Type     Madelin Maldonado Speech and Language Pathologist    Larissa Petersen CCC-SLP Speech and Language Pathologist                         Time Calculation:    Time Calculation- SLP       Row Name 08/08/23 1010             Time Calculation- SLP    SLP Start Time 0945  -CB      SLP Received On 08/08/23  -CB         Untimed Charges    57774-FT Treatment Swallow Minutes 30  -CB         Total Minutes    Untimed Charges Total Minutes 30  -CB       Total Minutes 30  -CB                User Key  (r) = Recorded By, (t) = Taken By, (c) = Cosigned By      Initials Name Provider Type    Larissa Petersen CCC-SLP Speech and Language Pathologist                    Therapy Charges for Today       Code Description Service Date Service Provider Modifiers Qty    [id number]  ST EVAL ORAL PHARYNG SWALLOW 2 8/8/2023 Larissa Cantu CCC-SLP GN 1                 SLP Discharge Summary  Anticipated Discharge Disposition (SLP): unknown  Reason for Discharge: all goals and outcomes met, no further needs identified  Progress Toward Achieving Short/long Term Goals: all goals met within established timelines  Discharge Destination: unknown    SHERRI Costa  8/8/2023

## 2023-08-08 NOTE — PROGRESS NOTES
[facility] INPATIENT PROGRESS NOTE    Length of Stay:  7 days    CHIEF COMPLAINT:  Thrombocytopenia, B12 deficiency, CKD4, shingles, prior hepatitis B infection    SUBJECTIVE:   Patient was able to ambulate with physical therapy earlier today.  She is more fatigued currently.  No bleeding issues.  She continues with constipation, no bowel movement in response to MiraLAX in addition to ongoing Amanda-Colace    ROS:  Review of Systems   A comprehensive review of systems was obtained with pertinent positive findings as noted in the interval history above.  All other systems negative.      OBJECTIVE:  Vitals:    08/07/23 2006 08/07/23 2013 08/07/23 2319 08/08/23 0312   BP:  (!) 202/74 170/63 153/57   BP Location:  Left arm Left arm    Patient Position:  Lying Lying    Pulse: 64 73  57   Resp: 16 18     Temp:  97.3 øF (36.3 øC)  96.7 øF (35.9 øC)   TempSrc:  Oral  Oral   SpO2:  97%  97%   Weight:       Height:             PHYSICAL EXAMINATION:  General: Elderly female sitting in reclining chair  Chest/Lungs: Clear to auscultation bilaterally  Heart: Irregularly irregular  Abdomen/GI:  mildly distended, slightly tender  Extremities: Trace-1+ bilateral lower extremity edema  Skin: Shingles rash has improved, lesions crusted, improving    Patient was examined today, unchanged from above    DIAGNOSTIC DATA:  Results Review:     I reviewed the patient's new clinical results.    Results from last 7 days   Lab Units 08/08/23  0641 08/07/23  0738 08/06/23  0550   WBC 10*3/mm3 8.07 7.90 6.39   HEMOGLOBIN g/dL 10.0* 10.4* 9.6*   HEMATOCRIT % 30.6* 31.9* 29.8*   PLATELETS 10*3/mm3 88* 86* 65*        Results from last 7 days   Lab Units 08/08/23  0641 08/07/23  0738 08/06/23  0550 08/05/23  0701 08/04/23  0743   SODIUM mmol/L 136 133* 130* 133* 128*   POTASSIUM mmol/L 4.7 5.1 4.6 4.6 4.2   CHLORIDE mmol/L 104 102 101 101 99   CO2 mmol/L 23.2 21.6* 20.8* 22.9 20.0*   BUN mg/dL 44* 46* 43* 41* 37*   CREATININE mg/dL 1.36*  1.56* 1.41* 1.32* 1.44*   CALCIUM mg/dL 8.4 8.2 8.2 8.4 8.1*   BILIRUBIN mg/dL  --   --  0.5 0.4 0.3   ALK PHOS U/L  --   --  31* 28* 35*   ALT (SGPT) U/L  --   --  21 13 11   AST (SGOT) U/L  --   --  17 16 14   GLUCOSE mg/dL 155* 134* 267* 142* 275*        Lab Results   Component Value Date    NEUTROABS 4.97 08/07/2023                     Assessment & Plan   ASSESSMENT/PLAN:  This is a 89 y.o. female with:     *Severe thrombocytopenia.  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  She has chronic liver disease which might be contributing.  She was previously treated with IVIG with good response.  Spleen was not enlarged on CT on 5/1/2023.  Previously elected to maintain patient on low-dose prednisone to maintain platelet count at least in the 50-61605 range, previously receiving prednisone 10 mg daily with relatively stable platelet count  She was found to have a new bruise over the abdomen with no preceding trauma.  7/31/2023: Platelet count 17,000.  Patient was given Solu-Medrol 125 mg IV.  8/1/2023: Platelet count 16,000.  IPF: 15.3%.  7/31/2023: PT and PTT were normal.  8/2/2023: Platelet count 28,000.  8/3/2023: Platelet count 39,000.  8/4/2023: Platelet count 48,000.  Rituxan was initially recommended.  However, she was found to have positive hepatitis B core antibody.  Therefore, Rituxan was not recommended.  Per GI, patient at risk for reactivation of hepatitis B and would require treatment with tenofovir if rituximab pursued.  Would not recommend use of Promacta due to risk of hepatotoxicity in the setting of ongoing question regarding patient having chronic liver disease.  IVIG 45 g (1 g/kg ideal body weight) daily x2 days was started on 8/4/2023.  Solu-Medrol dose was reduced to 60 mg daily on 8/4/2023.  8/5/2023: Platelet count 57,000.  8/6/2023: Platelet count 65,000.  Solu-Medrol dose decreased to 40 mg daily on 8/6/2023 8/7/2023: Platelet count 86,000.    Transitioned from  Solu-Medrol 40 mg daily to prednisone 20 mg daily on 8/8/2023 8/8/2023: Platelet count 88,000.  For now we will continue on prednisone 20 mg daily.  Options when effects of IVIG begin to decline in the next 2 to 4 weeks would be repeat dosing of IVIG in the outpatient setting versus consideration of Nplate.  Nplate however would be difficult regarding frequency of visits and patient's limited mobility.     *Vitamin B12 deficiency.  Vitamin B12 was 236 on 3/12/2023.  She has not been taking vitamin B12.  8/1/2023: Vitamin B12 287.  Patient started on vitamin B12 injections daily on 8/2/2023 x 5 days.  Subsequently resumed oral B12 1000 mcg daily    *Anemia  Most recent anemia evaluation 3/12/2023 with iron 135, ferritin 295, iron saturation 51%, TIBC 264  Significant component of anemia secondary to chronic disease and CKD  See above regarding B12 deficiency  Folate level normal at 14.5 on 8/1/2023  Hemoglobin fairly stable in the 10-11 range  Additional labs on 8/7/2023 with iron 120, ferritin 340, iron saturation 42%, TIBC 285.  Consistent with anemia secondary to chronic disease and CKD  Hemoglobin today relatively stable at 10.0     *Chronic kidney disease, stage 4.  Creatinine in the 1.3-2.0 range     *Shingles affecting the left side of the abdomen.  She was started on valacyclovir 1 g once daily-dose reduced due to decreased kidney function.  There was some worsening of the rash on 8/3/2023.  Valacyclovir 1 g daily x14 days initiated 8/2/2023  Improving     *Vaginal yeast infection  Patient had a recent UTI and was treated with levofloxacin.  Patient developed vaginal yeast infection following the antibiotic therapy and was given 1 dose of Diflucan     Dose #2 was given on 8/3/2023.     *Positive Hepatitis B core Ab.  No known prior history of hepatitis.   Imaging studies previously noted to be consistent with cirrhotic appearance of liver  Patient was seen by GI, additional laboratory studies obtained on  8/4/23 including positive hepatitis B E antibody, negative hepatitis B E antigen negative HBV PCR, positive hepatitis A antibody, negative hepatitis A IgM, negative hepatitis C antibody  Per GI, patient at risk for reactivation of hepatitis B if treated with rituximab.  Recommended use of concurrent tenofovir if rituximab is pursued in the future.    *GI prophylaxis  Protonix 40 mg daily    *Possible aspiration  Patient undergoing evaluation per hospitalist    *Constipation  Currently receiving Amanda-Colace 2 tablets twice daily and MiraLAX daily  Patient still with no bowel movement, will attempt Dulcolax suppository and if ineffective try fleets enema today.    *CODE STATUS  Patient is DNR, continue current level of support    *Disposition  From hematologic standpoint, patient is acceptable for discharge home at this time however she has increasing fatigue today and ongoing constipation issues which likely need to be addressed.    PLAN:     Continue Amanda-Colace 2 tablets twice daily and MiraLAX daily  Dulcolax 10 mg suppository and if ineffective fleets enema today.  Continue prednisone 20 mg daily   Continue oral B12 1000 mcg daily  Continue Valtrex 1000 mg daily x14 days (initiated 8/2/2023) for shingles  Options when effects of IVIG begin to decline in the next 2 to 4 weeks would be repeat dosing of IVIG in the outpatient setting versus consideration of Nplate.  Nplate however would be difficult regarding frequency of visits and patient's limited mobility.  Patient acceptable for discharge home from hematology standpoint however patient would like to stay today with ongoing constipation issues.  Daily CBC    Discussed with patient and daughter at bedside.         Pablo Sherman MD

## 2023-08-08 NOTE — PROGRESS NOTES
"Daily progress note    Primary care physician      Subjective  Doing same with no new complaints and family at bedside.      History of present illness  89-year-old white female with very complex past medical history and well-known to our service including ITP chronic low back pain with L5 vertebral body fracture degenerative disease paroxysmal atrial fibrillation DVT diabetes hypertension hyperlipidemia hypothyroidism congestive heart failure and chronic kidney disease stage III who lives at home with her daughter brought to the emergency room with generalized weakness and bruises all over.  Patient work-up in ER revealed worsening thrombocytopenia admitted for management.  Patient denies any fever chills chest pain shortness of breath abdominal pain nausea vomiting diarrhea.  Patient is DNR per her wishes.     REVIEW OF SYSTEMS  Unremarkable      PHYSICAL EXAM  Blood pressure 148/63, pulse 59, temperature 97.3 øF (36.3 øC), temperature source Oral, resp. rate 18, height 144.8 cm (57.01\"), weight 80.1 kg (176 lb 9.4 oz), SpO2 99 %.    Constitutional:       General: She is not in acute distress.     Appearance: She is well-developed.   HENT:      Head: Normocephalic and atraumatic.   Eyes:      Extraocular Movements: Extraocular movements intact.   Cardiovascular:      Rate and Rhythm: Normal rate and regular rhythm.      Heart sounds: Normal heart sounds.   Pulmonary:      Effort: Pulmonary effort is normal.      Breath sounds: Normal breath sounds.   Abdominal:      General: There is no distension.      Comments: Erythematous macular clusters noted along the left flank   Genitourinary:     Comments: Indwelling Hoffman catheter  Skin:     General: Skin is warm.   Neurological:      General: No focal deficit present.      Mental Status: She is alert and oriented to person, place, and time.   Psychiatric:         Mood and Affect: Mood normal.      LAB RESULTS  Lab Results (last 24 hours)       Procedure " Component Value Units Date/Time    POC Glucose Once [[id number]]  (Abnormal) Collected: 08/08/23 1121    Specimen: Blood Updated: 08/08/23 1122     Glucose 211 mg/dL      Comment: Meter: [id number] : [id number] Sarwat JOHNSON       Manual Differential [[id number]]  (Abnormal) Collected: 08/08/23 0641    Specimen: Blood Updated: 08/08/23 1022     Neutrophil % 82.1 %      Lymphocyte % 9.0 %      Monocyte % 3.8 %      Basophil % 1.3 %      Metamyelocyte % 1.3 %      Myelocyte % 2.6 %      Neutrophils Absolute 6.63 10*3/mm3      Lymphocytes Absolute 0.73 10*3/mm3      Monocytes Absolute 0.31 10*3/mm3      Basophils Absolute 0.10 10*3/mm3      RBC Morphology Normal     WBC Morphology Normal     Platelet Morphology Normal    POC Glucose Once [[id number]]  (Abnormal) Collected: 08/08/23 0812    Specimen: Blood Updated: 08/08/23 0814     Glucose 139 mg/dL      Comment: Meter: [id number] : [id number] Sarwat Lindsay ELIZABETH       CBC & Differential [[id number]]  (Abnormal) Collected: 08/08/23 0641    Specimen: Blood Updated: 08/08/23 0742    Narrative:      The following orders were created for panel order CBC & Differential.  Procedure                               Abnormality         Status                     ---------                               -----------         ------                     CBC Auto Differential[[id number]]        Abnormal            Final result                 Please view results for these tests on the individual orders.    CBC Auto Differential [[id number]]  (Abnormal) Collected: 08/08/23 0641    Specimen: Blood Updated: 08/08/23 0742     WBC 8.07 10*3/mm3      RBC 3.63 10*6/mm3      Hemoglobin 10.0 g/dL      Hematocrit 30.6 %      MCV 84.3 fL      MCH 27.5 pg      MCHC 32.7 g/dL      RDW 17.5 %      RDW-SD 52.5 fl      MPV 9.9 fL      Platelets 88 10*3/mm3     Basic Metabolic Panel [[id number]]  (Abnormal) Collected: 08/08/23 0641    Specimen: Blood Updated: 08/08/23 0734     Glucose 155 mg/dL       BUN 44 mg/dL      Creatinine 1.36 mg/dL      Sodium 136 mmol/L      Potassium 4.7 mmol/L      Comment: Slight hemolysis detected by analyzer. Results may be affected.        Chloride 104 mmol/L      CO2 23.2 mmol/L      Calcium 8.4 mg/dL      BUN/Creatinine Ratio 32.4     Anion Gap 8.8 mmol/L      eGFR 36.6 mL/min/1.73     Narrative:      GFR Normal >60  Chronic Kidney Disease <60  Kidney Failure <15    The GFR formula is only valid for adults with stable renal function between ages 18 and 70.    POC Glucose Once [[id number]]  (Abnormal) Collected: 08/07/23 2052    Specimen: Blood Updated: 08/07/23 2053     Glucose 344 mg/dL      Comment: Meter: [id number] : [id number] Keron JOHNSON       POC Glucose Once [[id number]]  (Abnormal) Collected: 08/07/23 1648    Specimen: Blood Updated: 08/07/23 1650     Glucose 261 mg/dL      Comment: Meter: [id number] : [id number] Sarwat JOHNSON       Ferritin [[id number]]  (Abnormal) Collected: 08/07/23 0738    Specimen: Blood Updated: 08/07/23 1558     Ferritin 340.00 ng/mL     Narrative:      Results may be falsely decreased if patient taking Biotin.      Iron Profile [[id number]]  (Abnormal) Collected: 08/07/23 0738    Specimen: Blood Updated: 08/07/23 1553     Iron 120 mcg/dL      Iron Saturation (TSAT) 42 %      Transferrin 191 mg/dL      TIBC 285 mcg/dL           Imaging Results (Last 24 Hours)       ** No results found for the last 24 hours. **            Current Facility-Administered Medications:     bisacodyl (DULCOLAX) suppository 10 mg, 10 mg, Rectal, Daily PRN, Pablo Sherman Jr., MD    budesonide-formoterol (SYMBICORT) 160-4.5 MCG/ACT inhaler 2 puff, 2 puff, Inhalation, BID, Fred Jeffrey MD, 2 puff at 08/08/23 0937    carvedilol (COREG) tablet 3.125 mg, 3.125 mg, Oral, BID With Meals, Mango Arnold MD, 3.125 mg at 08/08/23 0915    cholecalciferol (VITAMIN D3) tablet 1,000 Units, 1,000 Units, Oral, Daily, Fred Jeffrey MD, 1,000 Units at 08/08/23 0914     gabapentin (NEURONTIN) capsule 600 mg, 600 mg, Oral, Q12H, Fred Jeffrey MD, 600 mg at 08/08/23 0914    guaiFENesin (MUCINEX) 12 hr tablet 600 mg, 600 mg, Oral, Q12H, Mango Arnold MD, 600 mg at 08/08/23 0915    hydrALAZINE (APRESOLINE) tablet 100 mg, 100 mg, Oral, TID, Mango Arnold MD, 100 mg at 08/08/23 0917    HYDROcodone-acetaminophen (NORCO) 7.5-325 MG per tablet 1 tablet, 1 tablet, Oral, Q4H PRN, Mango Arnold MD, 1 tablet at 08/08/23 1455    insulin glargine (LANTUS, SEMGLEE) injection 36 Units, 36 Units, Subcutaneous, Nightly, Mango Arnold MD, 36 Units at 08/07/23 2235    insulin lispro (HUMALOG/ADMELOG) injection 12 Units, 12 Units, Subcutaneous, TID With Meals, Mango Arnold MD, 12 Units at 08/08/23 1133    insulin lispro (HUMALOG/ADMELOG) injection 2-7 Units, 2-7 Units, Subcutaneous, 4x Daily AC & at Bedtime, Mango Arnold MD, 3 Units at 08/08/23 1133    levothyroxine (SYNTHROID, LEVOTHROID) tablet 25 mcg, 25 mcg, Oral, QAM, Fred Jeffrey MD, 25 mcg at 08/08/23 0553    lidocaine (LIDODERM) 5 % 2 patch, 2 patch, Transdermal, Q24H, Tomer Jeong MD, 2 patch at 08/06/23 0928    LORazepam (ATIVAN) tablet 1 mg, 1 mg, Oral, Q PM, Fred Jeffrey MD, 1 mg at 08/07/23 2023    montelukast (SINGULAIR) tablet 10 mg, 10 mg, Oral, Nightly, Fred Jeffrey MD, 10 mg at 08/07/23 2023    ondansetron (ZOFRAN) injection 4 mg, 4 mg, Intravenous, Q6H PRN, Fred Jeffrey MD, 4 mg at 08/01/23 2044    pantoprazole (PROTONIX) EC tablet 40 mg, 40 mg, Oral, Q AM, Fred Jeffrey MD, 40 mg at 08/08/23 0553    polyethylene glycol (MIRALAX) packet 17 g, 17 g, Oral, Daily, Pablo Sherman Jr., MD, 17 g at 08/08/23 0917    predniSONE (DELTASONE) tablet 20 mg, 20 mg, Oral, Daily With Breakfast, Pablo Sherman Jr., MD, 20 mg at 08/08/23 0915    sennosides-docusate (PERICOLACE) 8.6-50 MG per tablet 2 tablet, 2 tablet, Oral, BID, Mango Arnold MD, 2 tablet at 08/08/23 0914    tamsulosin (FLOMAX) 24 hr capsule 0.4 mg,  0.4 mg, Oral, Daily, Fred Jeffrey MD, 0.4 mg at 08/08/23 1133    valACYclovir (VALTREX) tablet 1,000 mg, 1,000 mg, Oral, Q24H, Tomer Jeong MD, 1,000 mg at 08/08/23 1133    vitamin B-12 (CYANOCOBALAMIN) tablet 1,000 mcg, 1,000 mcg, Oral, Daily, Pablo Sherman Jr., MD, 1,000 mcg at 08/08/23 1133     ASSESSMENT  Chronic ITP   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  Dysphagia  Chronic anemia  Congestive heart failure  Diabetes mellitus  Hypertension  Hypothyroidism  Paroxysmal atrial fibrillation  Pulmonary hypertension  Aortic stenosis  Degenerative disc disease  Compression fracture L5 vertebra  Shingles  Chronic kidney disease stage IV  Chronic kidney retention with Hoffman catheter    PLAN  CPM  Continue steroids PO  GI and hematology to follow patient  Adjust home medications  Stress ulcer DVT prophylaxis  Supportive care  PT OT  DNR  Discussed with family nursing staff   Discharge planning    KADEEM RIVAS MD    Copied text in this note has been reviewed and is accurate as of 08/08/23

## 2023-08-08 NOTE — THERAPY TREATMENT NOTE
Patient Name: Helena Carmen  : 1931    MRN: [id number]                              Today's Date: 2023       Admit Date: 2023    Visit Dx:     ICD-10-CM ICD-9-CM   1. Uncontrolled hypertension  I10 401.9   2. Thrombocytopenia  D69.6 287.5   3. Hyponatremia  E87.1 276.1   4. History of ITP  Z86.2 V12.3   5. Rash (left flank)  R21 782.1     Patient Active Problem List   Diagnosis    Aortic valve stenosis    Fatigue    MI (mitral incompetence)    PVC (premature ventricular contraction)    Legally blind    Essential hypertension    Hypertriglyceridemia    Pulmonary hypertension    Paroxysmal atrial fibrillation    Anxiety    Stage 4 chronic kidney disease    Chronic pain disorder    Degeneration of lumbar intervertebral disc    Type 2 diabetes mellitus with hyperglycemia    Esophageal reflux    Gastroparesis    Hiatal hernia    Iatrogenic hypothyroidism    Macular degeneration    Malignant neoplasm of uterus    Osteoarthritis of knee    Peripheral nerve disease    Secondary hyperparathyroidism    Thrombocytopenia    Chronic heart failure with preserved ejection fraction    Other cirrhosis of liver    Hypothyroidism    Nonrheumatic tricuspid valve regurgitation    Anemia, chronic disease    Hyponatremia    Closed fracture of fifth lumbar vertebra    Closed fracture of fifth lumbar vertebra, unspecified fracture morphology, initial encounter    Diabetic polyneuropathy associated with type 2 diabetes mellitus    Chronic ITP (idiopathic thrombocytopenia)    Chronic midline low back pain with bilateral sciatica    Acute deep vein thrombosis of calf, bilateral    Compression fracture of L5 vertebra with routine healing    Acute left-sided low back pain with left-sided sciatica    Urine retention    Rib fracture    Acute on chronic combined systolic and diastolic congestive heart failure    Acute UTI    Blindness right eye category 3, blindness left eye category 3    Cognitive communication deficit     Pseudomonas (aeruginosa) (mallei) (pseudomallei) as the cause of diseases classified elsewhere    Multifocal pneumonia     Past Medical History:   Diagnosis Date    Aortic valve stenosis     s/p tissue AVR    Back pain     CKD (chronic kidney disease)     Colitis due to Clostridioides difficile 01/26/2022    Diastolic dysfunction     Grade 2 per echocardiogram 2013    Diverticulosis     Exertional shortness of breath     Heart disease     Hiatal hernia     Hyperlipidemia     Hypertension     Hyperthyroidism     Hypertriglyceridemia 05/31/2018    Hypothyroidism     L5 compression fracture (HCC) 08/2022    Left ventricular hypertrophy     Legally blind     Liver disease     Low back pain     Macular degeneration     Mitral regurgitation     Osteoarthritis of hip     Osteoporosis     Pancreatitis 01/26/2022    Paroxysmal atrial fibrillation     Peripheral neuropathy     Premature ventricular contractions     Pulmonary hypertension     Renal insufficiency syndrome     Type 2 diabetes mellitus     Uterine cancer      Past Surgical History:   Procedure Laterality Date    AORTIC VALVE REPAIR/REPLACEMENT      APPENDECTOMY      CATARACT EXTRACTION      1970, 1999    CHOLECYSTECTOMY      COLONOSCOPY      ENDOSCOPY  08/15/2014    no gross lesions in stomach/duodenum, erythrematous mucosa in stomach    EPIDURAL BLOCK      HYSTERECTOMY  2007    STERNOTOMY      UPPER GASTROINTESTINAL ENDOSCOPY        General Information       Row Name 08/08/23 1521          OT Time and Intention    Document Type therapy note (daily note)  -JW     Mode of Treatment occupational therapy  -       Row Name 08/08/23 1521          General Information    Patient Profile Reviewed yes  -JW     Existing Precautions/Restrictions fall  -JW     Barriers to Rehab visual deficit  -       Row Name 08/08/23 1521          Cognition    Orientation Status (Cognition) oriented x 3  -       Row Name 08/08/23 1521          Safety Issues, Functional Mobility     Impairments Affecting Function (Mobility) balance;endurance/activity tolerance;postural/trunk control;strength;visual/perceptual  -               User Key  (r) = Recorded By, (t) = Taken By, (c) = Cosigned By      Initials Name Provider Type    Yris Echavarria OT Occupational Therapist                     Mobility/ADL's       Row Name 08/08/23 1521          Bed Mobility    Bed Mobility supine-sit  -     Supine-Sit [place] (Bed Mobility) minimum assist (75% patient effort);moderate assist (50% patient effort);verbal cues  -     Assistive Device (Bed Mobility) head of bed elevated;bed rails  -       Row Name 08/08/23 1521          Bed-Chair Transfer    Bed-Chair [place] (Transfers) minimum assist (75% patient effort);1 person assist;verbal cues  -[facility] Name 08/08/23 1521          Sit-Stand Transfer    Sit-Stand [place] (Transfers) minimum assist (75% patient effort);verbal cues  -     Assistive Device (Sit-Stand Transfers) walker, front-wheeled  -[facility] Name 08/08/23 1521          Functional Mobility    Functional Mobility- Comment takes a few steps from bed to chair, too fatigued to ambulate into bathroom  -       Row Name 08/08/23 1521          Activities of Daily Living    BADL Assessment/Intervention grooming  -[facility] Name 08/08/23 1521          Lower Body Dressing Assessment/Training    Assumption Level (Lower Body Dressing) shoes/slippers;don;doff;maximum assist (25% patient effort)  -     Position (Lower Body Dressing) edge of bed sitting  -[facility] Name 08/08/23 1521          Grooming Assessment/Training    Assumption Level (Grooming) oral care regimen;wash face, hands;set up;supervision  -     Position (Grooming) supported sitting  -     Comment, (Grooming) completes grooming ADLs sitting [facility] with set-up/spv  -               User Key  (r) = Recorded By, (t) = Taken By, (c) = Cosigned By      Initials Name Provider Type    Yris Echavarria OT  Occupational Therapist                   Obj/Interventions    No documentation.                  Goals/Plan    No documentation.                  Clinical Impression       Row Name 08/08/23 1526          Pain Assessment    Pretreatment Pain Rating 0/10 - no pain  -     Posttreatment Pain Rating 0/10 - no pain  -       Row Name 08/08/23 1526          Plan of Care Review    Plan of Care Reviewed With patient  -     Outcome Evaluation Pt was found supine in bed, pleasant and agreeable to OOB ax. she req's modA to sit EOB and max for LB dressing. She is able to take a few steps from bed to chair but further mobility deferred d/t poor endurance/activity tolerance. She completes grooming tasks sitting in chair with set-up of items. Pt fatigued after activity and is left with all needs in reach.  -       Row Name 08/08/23 1526          Positioning and Restraints    Pre-Treatment Position in bed  -     Post Treatment Position chair  -JW     In Chair notified nsg;sitting;call light within reach;encouraged to call for assist;exit alarm on;legs elevated  -               User Key  (r) = Recorded By, (t) = Taken By, (c) = Cosigned By      Initials Name Provider Type    Yris Echavarria OT Occupational Therapist                   Outcome Measures       Row Name 08/08/23 1033 08/08/23 0920       How much help from another person do you currently need...    Turning from your back to your side while in flat bed without using bedrails? 3 (P)   -SK 3  -DH    Moving from lying on back to sitting on the side of a flat bed without bedrails? 2 (P)   -SK 2  -DH    Moving to and from a bed to a chair (including a wheelchair)? 3 (P)   -SK 3  -DH    Standing up from a chair using your arms (e.g., wheelchair, bedside chair)? 3 (P)   -SK 3  -DH    Climbing 3-5 steps with a railing? 1 (P)   -SK 2  -DH    To walk in hospital room? 3 (P)   -SK 3  -DH    AM-PAC 6 Clicks Score (PT) 15 (P)   -SK 16  -DH    Highest level of mobility 4 -->  Transferred to chair/commode (P)   -SK 5 --> Static standing  -      Row Name 08/08/23 1033          Functional Assessment    Outcome Measure Options AM-PAC 6 Clicks Basic Mobility (PT) (P)   -SK               User Key  (r) = Recorded By, (t) = Taken By, (c) = Cosigned By      Initials Name Provider Type    Dorinda Fink, RN Registered Nurse    Jocy Olivera, PT Student PT Student                    Occupational Therapy Education       Title: PT OT SLP Therapies (In Progress)       Topic: Occupational Therapy (In Progress)       Point: ADL training (Done)       Description:   Instruct learner(s) on proper safety adaptation and remediation techniques during self care or transfers.   Instruct in proper use of assistive devices.                  Learning Progress Summary             Patient Acceptance, E, VU by AHMET at 8/2/2023 1249    Comment: role of OT, plan of care, safety                         Point: Home exercise program (Not Started)       Description:   Instruct learner(s) on appropriate technique for monitoring, assisting and/or progressing therapeutic exercises/activities.                  Learner Progress:  Not documented in this visit.              Point: Precautions (Done)       Description:   Instruct learner(s) on prescribed precautions during self-care and functional transfers.                  Learning Progress Summary             Patient Acceptance, E, VU by AHMET at 8/2/2023 1249    Comment: role of OT, plan of care, safety                         Point: Body mechanics (Done)       Description:   Instruct learner(s) on proper positioning and spine alignment during self-care, functional mobility activities and/or exercises.                  Learning Progress Summary             Patient Acceptance, E, VU by AHMET at 8/2/2023 1249    Comment: role of OT, plan of care, safety                                         User Key       Initials Effective Dates Name Provider Type Lotus LEO  06/10/21 -  Yris Cho OT Occupational Therapist OT                  OT Recommendation and Plan  Planned Therapy Interventions (OT): activity tolerance training, BADL retraining, functional balance retraining, occupation/activity based interventions, patient/caregiver education/training, transfer/mobility retraining, strengthening exercise  Therapy Frequency (OT): 3 times/wk  Plan of Care Review  Plan of Care Reviewed With: patient  Outcome Evaluation: Pt was found supine in bed, pleasant and agreeable to OOB ax. she req's modA to sit EOB and max for LB dressing. She is able to take a few steps from bed to chair but further mobility deferred d/t poor endurance/activity tolerance. She completes grooming tasks sitting in chair with set-up of items. Pt fatigued after activity and is left with all needs in reach.     Time Calculation:   Evaluation Complexity (OT)  Review Occupational Profile/Medical/Therapy History Complexity: brief/low complexity  Assessment, Occupational Performance/Identification of Deficit Complexity: 1-3 performance deficits  Clinical Decision Making Complexity (OT): problem focused assessment/low complexity  Overall Complexity of Evaluation (OT): low complexity     Time Calculation- OT       Row Name 08/08/23 1529             Time Calculation- OT    OT Start Time 0851  -JW      OT Stop Time 0914  -      OT Time Calculation (min) 23 min  -      Total Timed Code Minutes- OT 23 minute(s)  -      OT Received On 08/08/23  -      OT - Next Appointment 08/10/23  -         Timed Charges    90722 - OT Therapeutic Activity Minutes 10  -JW      64925 - OT Self Care/Mgmt Minutes 13  -JW         Total Minutes    Timed Charges Total Minutes 23  -       Total Minutes 23  -JW                User Key  (r) = Recorded By, (t) = Taken By, (c) = Cosigned By      Initials Name Provider Type    JW Yris Cho OT Occupational Therapist                  Therapy Charges for Today       Code Description  Service Date Service Provider Modifiers Qty    [id number]  OT THERAPEUTIC ACT EA 15 MIN 8/8/2023 Yris Cho OT GO 1    83830423583 HC OT SELF CARE/MGMT/TRAIN EA 15 MIN 8/8/2023 Yris Cho OT GO 1                 Yris Cho OT  8/8/2023

## 2023-08-08 NOTE — PLAN OF CARE
Goal Outcome Evaluation:  Plan of Care Reviewed With: (P) patient        Progress: (P) improving  Outcome Evaluation: (P) Pt seen for PT treatment this AM. Pt sitting in chair upon arrival. CGAx1 for STS transfer requring VC for hand placement. CGAx1/minAx1 for ambulating 3' forward and back- fatigue limiting. Pt demonstrates a significant forward flexed posture in standing with VC for posture correction, decreased gait speed, decreased step length, and decreased endurance. Pt will continue to benefit from skilled PT interventions to address overall functional mobility and safety. Rec SNF or home with home health and 24 hour assist.      Anticipated Discharge Disposition (PT): (P) skilled nursing facility, home with assist, home with 24/7 care, home with home health

## 2023-08-08 NOTE — PLAN OF CARE
Goal Outcome Evaluation:      Discharge from  at this time, patient reports no swallowing issues since initiation of soft/slick diet.  Daughter reports patient likes [employer] Healthy diet, order adjusted.  Please reconsult, if needed.

## 2023-08-08 NOTE — PLAN OF CARE
Goal Outcome Evaluation:  Plan of Care Reviewed With: patient           Outcome Evaluation: Pt was found supine in bed, pleasant and agreeable to OOB ax. she req's modA to sit EOB and max for LB dressing. She is able to take a few steps from bed to chair but further mobility deferred d/t poor endurance/activity tolerance. She completes grooming tasks sitting in chair with set-up of items. Pt fatigued after activity and is left with all needs in reach.

## 2023-08-09 LAB
ANION GAP SERPL CALCULATED.3IONS-SCNC: 9 MMOL/L (ref 5–15)
BASOPHILS # BLD AUTO: 0.01 10*3/MM3 (ref 0–0.2)
BASOPHILS NFR BLD AUTO: 0.1 % (ref 0–1.5)
BUN SERPL-MCNC: 40 MG/DL (ref 8–23)
BUN/CREAT SERPL: 32.3 (ref 7–25)
CALCIUM SPEC-SCNC: 7.6 MG/DL (ref 8.2–9.6)
CHLORIDE SERPL-SCNC: 100 MMOL/L (ref 98–107)
CO2 SERPL-SCNC: 23 MMOL/L (ref 22–29)
CREAT SERPL-MCNC: 1.24 MG/DL (ref 0.57–1)
DEPRECATED RDW RBC AUTO: 54.4 FL (ref 37–54)
EGFRCR SERPLBLD CKD-EPI 2021: 40.9 ML/MIN/1.73
EOSINOPHIL # BLD AUTO: 0.04 10*3/MM3 (ref 0–0.4)
EOSINOPHIL NFR BLD AUTO: 0.5 % (ref 0.3–6.2)
ERYTHROCYTE [DISTWIDTH] IN BLOOD BY AUTOMATED COUNT: 18.4 % (ref 12.3–15.4)
GLUCOSE BLDC GLUCOMTR-MCNC: 105 MG/DL (ref 70–130)
GLUCOSE BLDC GLUCOMTR-MCNC: 208 MG/DL (ref 70–130)
GLUCOSE BLDC GLUCOMTR-MCNC: 313 MG/DL (ref 70–130)
GLUCOSE BLDC GLUCOMTR-MCNC: 368 MG/DL (ref 70–130)
GLUCOSE SERPL-MCNC: 119 MG/DL (ref 65–99)
HCT VFR BLD AUTO: 30.3 % (ref 34–46.6)
HGB BLD-MCNC: 10.3 G/DL (ref 12–15.9)
LYMPHOCYTES # BLD AUTO: 1.9 10*3/MM3 (ref 0.7–3.1)
LYMPHOCYTES NFR BLD AUTO: 23.5 % (ref 19.6–45.3)
MCH RBC QN AUTO: 28.6 PG (ref 26.6–33)
MCHC RBC AUTO-ENTMCNC: 34 G/DL (ref 31.5–35.7)
MCV RBC AUTO: 84.2 FL (ref 79–97)
MONOCYTES # BLD AUTO: 0.7 10*3/MM3 (ref 0.1–0.9)
MONOCYTES NFR BLD AUTO: 8.6 % (ref 5–12)
NEUTROPHILS NFR BLD AUTO: 5.08 10*3/MM3 (ref 1.7–7)
NEUTROPHILS NFR BLD AUTO: 62.7 % (ref 42.7–76)
PLATELET # BLD AUTO: 97 10*3/MM3 (ref 140–450)
PMV BLD AUTO: 10.6 FL (ref 6–12)
POTASSIUM SERPL-SCNC: 4.5 MMOL/L (ref 3.5–5.2)
RBC # BLD AUTO: 3.6 10*6/MM3 (ref 3.77–5.28)
SODIUM SERPL-SCNC: 132 MMOL/L (ref 136–145)
WBC NRBC COR # BLD: 8.1 10*3/MM3 (ref 3.4–10.8)

## 2023-08-09 PROCEDURE — 63710000001 INSULIN LISPRO (HUMAN) PER 5 UNITS: Performed by: HOSPITALIST

## 2023-08-09 PROCEDURE — 94799 UNLISTED PULMONARY SVC/PX: CPT

## 2023-08-09 PROCEDURE — 97530 THERAPEUTIC ACTIVITIES: CPT

## 2023-08-09 PROCEDURE — 80048 BASIC METABOLIC PNL TOTAL CA: CPT | Performed by: HOSPITALIST

## 2023-08-09 PROCEDURE — 97535 SELF CARE MNGMENT TRAINING: CPT

## 2023-08-09 PROCEDURE — 94664 DEMO&/EVAL PT USE INHALER: CPT

## 2023-08-09 PROCEDURE — 63710000001 INSULIN GLARGINE PER 5 UNITS: Performed by: HOSPITALIST

## 2023-08-09 PROCEDURE — 82948 REAGENT STRIP/BLOOD GLUCOSE: CPT

## 2023-08-09 PROCEDURE — 63710000001 PREDNISONE PER 1 MG: Performed by: INTERNAL MEDICINE

## 2023-08-09 PROCEDURE — 85025 COMPLETE CBC W/AUTO DIFF WBC: CPT | Performed by: INTERNAL MEDICINE

## 2023-08-09 PROCEDURE — 99232 SBSQ HOSP IP/OBS MODERATE 35: CPT | Performed by: INTERNAL MEDICINE

## 2023-08-09 RX ORDER — PREDNISONE 10 MG/1
20 TABLET ORAL DAILY
Qty: 60 TABLET | Refills: 0 | Status: SHIPPED | OUTPATIENT
Start: 2023-08-09

## 2023-08-09 RX ADMIN — LORAZEPAM 1 MG: 1 TABLET ORAL at 21:53

## 2023-08-09 RX ADMIN — HYDROCODONE BITARTRATE AND ACETAMINOPHEN 1 TABLET: 7.5; 325 TABLET ORAL at 01:26

## 2023-08-09 RX ADMIN — INSULIN LISPRO 6 UNITS: 100 INJECTION, SOLUTION INTRAVENOUS; SUBCUTANEOUS at 17:22

## 2023-08-09 RX ADMIN — INSULIN GLARGINE 36 UNITS: 100 INJECTION, SOLUTION SUBCUTANEOUS at 21:53

## 2023-08-09 RX ADMIN — GABAPENTIN 600 MG: 300 CAPSULE ORAL at 09:00

## 2023-08-09 RX ADMIN — INSULIN LISPRO 3 UNITS: 100 INJECTION, SOLUTION INTRAVENOUS; SUBCUTANEOUS at 12:36

## 2023-08-09 RX ADMIN — LEVOTHYROXINE SODIUM 25 MCG: 0.03 TABLET ORAL at 05:19

## 2023-08-09 RX ADMIN — SENNOSIDES AND DOCUSATE SODIUM 2 TABLET: 50; 8.6 TABLET ORAL at 08:59

## 2023-08-09 RX ADMIN — MONTELUKAST SODIUM 10 MG: 10 TABLET, FILM COATED ORAL at 21:54

## 2023-08-09 RX ADMIN — GABAPENTIN 600 MG: 300 CAPSULE ORAL at 21:53

## 2023-08-09 RX ADMIN — BUDESONIDE AND FORMOTEROL FUMARATE DIHYDRATE 2 PUFF: 160; 4.5 AEROSOL RESPIRATORY (INHALATION) at 07:21

## 2023-08-09 RX ADMIN — GUAIFENESIN 600 MG: 600 TABLET, EXTENDED RELEASE ORAL at 09:00

## 2023-08-09 RX ADMIN — Medication 1000 MCG: at 09:00

## 2023-08-09 RX ADMIN — HYDRALAZINE HYDROCHLORIDE 100 MG: 50 TABLET, FILM COATED ORAL at 09:00

## 2023-08-09 RX ADMIN — TAMSULOSIN HYDROCHLORIDE 0.4 MG: 0.4 CAPSULE ORAL at 09:00

## 2023-08-09 RX ADMIN — CARVEDILOL 3.12 MG: 3.12 TABLET, FILM COATED ORAL at 09:00

## 2023-08-09 RX ADMIN — INSULIN LISPRO 12 UNITS: 100 INJECTION, SOLUTION INTRAVENOUS; SUBCUTANEOUS at 17:22

## 2023-08-09 RX ADMIN — POLYETHYLENE GLYCOL 3350 17 G: 17 POWDER, FOR SOLUTION ORAL at 08:59

## 2023-08-09 RX ADMIN — HYDROCODONE BITARTRATE AND ACETAMINOPHEN 1 TABLET: 7.5; 325 TABLET ORAL at 05:20

## 2023-08-09 RX ADMIN — HYDRALAZINE HYDROCHLORIDE 100 MG: 50 TABLET, FILM COATED ORAL at 21:54

## 2023-08-09 RX ADMIN — CARVEDILOL 3.12 MG: 3.12 TABLET, FILM COATED ORAL at 17:22

## 2023-08-09 RX ADMIN — INSULIN LISPRO 5 UNITS: 100 INJECTION, SOLUTION INTRAVENOUS; SUBCUTANEOUS at 21:52

## 2023-08-09 RX ADMIN — Medication 1000 UNITS: at 09:00

## 2023-08-09 RX ADMIN — PANTOPRAZOLE SODIUM 40 MG: 40 TABLET, DELAYED RELEASE ORAL at 05:20

## 2023-08-09 RX ADMIN — HYDRALAZINE HYDROCHLORIDE 100 MG: 50 TABLET, FILM COATED ORAL at 17:22

## 2023-08-09 RX ADMIN — GUAIFENESIN 600 MG: 600 TABLET, EXTENDED RELEASE ORAL at 21:53

## 2023-08-09 RX ADMIN — VALACYCLOVIR HYDROCHLORIDE 1000 MG: 500 TABLET, FILM COATED ORAL at 09:00

## 2023-08-09 RX ADMIN — PREDNISONE 20 MG: 20 TABLET ORAL at 09:00

## 2023-08-09 RX ADMIN — LIDOCAINE 2 PATCH: 700 PATCH TOPICAL at 10:42

## 2023-08-09 RX ADMIN — BUDESONIDE AND FORMOTEROL FUMARATE DIHYDRATE 2 PUFF: 160; 4.5 AEROSOL RESPIRATORY (INHALATION) at 22:13

## 2023-08-09 RX ADMIN — HYDROCODONE BITARTRATE AND ACETAMINOPHEN 1 TABLET: 7.5; 325 TABLET ORAL at 21:53

## 2023-08-09 RX ADMIN — HYDROCODONE BITARTRATE AND ACETAMINOPHEN 1 TABLET: 7.5; 325 TABLET ORAL at 17:46

## 2023-08-09 NOTE — PLAN OF CARE
Goal Outcome Evaluation:  Plan of Care Reviewed With: patient, family        Progress: improving     Pt AxOx4, calm and cooperative. Sitting up in the chair most of the day, waffle cushion in place. Meds given whole with water, see MAR. PRN Norco given once for pain, with good effect. Pt worked with physical therapy. Assist x2 with a walker to the BC or chair. Good oral intake. Chronic jimenes care done. Multiple BM x4 this morning, no stool softener needed tonight, soft, light brown. Daughter at bedside, attentive to pt. RN will continue to monitor.

## 2023-08-09 NOTE — PLAN OF CARE
Problem: Adult Inpatient Plan of Care  Goal: Plan of Care Review  8/9/2023 0612 by Conchis Heaton RN  Flowsheets (Taken 8/9/2023 0612)  Plan of Care Reviewed With: patient  Outcome Evaluation: patient had large BM at beginning of shift. up with assist x2 to bsc. f/c in place to bsd. pain well controlled with prn meds-see mar. turn q2hr. resting quietly between care  Goal: Patient-Specific Goal (Individualized)  Outcome: Ongoing, Progressing  Goal: Absence of Hospital-Acquired Illness or Injury  Outcome: Ongoing, Progressing  Intervention: Identify and Manage Fall Risk  Recent Flowsheet Documentation  Taken 8/9/2023 0609 by Conchis Heaton RN  Safety Promotion/Fall Prevention:   safety round/check completed   room organization consistent  Taken 8/9/2023 0455 by Conchis Heaton RN  Safety Promotion/Fall Prevention: room organization consistent  Taken 8/9/2023 0234 by Conchis Heaton RN  Safety Promotion/Fall Prevention:   safety round/check completed   room organization consistent  Taken 8/9/2023 0031 by Conchis Heaton RN  Safety Promotion/Fall Prevention:   safety round/check completed   room organization consistent  Taken 8/8/2023 2059 by Conchis Heaton RN  Safety Promotion/Fall Prevention:   safety round/check completed   room organization consistent  Intervention: Prevent Skin Injury  Recent Flowsheet Documentation  Taken 8/9/2023 0609 by Conchis Heaton RN  Body Position:   patient/family refused   position changed independently   sitting up in bed  Taken 8/9/2023 0455 by Conchis Heaton RN  Body Position:   turned   supine  Taken 8/9/2023 0234 by Conchis Heaton RN  Body Position:   turned   left   tilted  Taken 8/9/2023 0031 by Conchis Heaton RN  Body Position:   turned   right   tilted  Taken 8/8/2023 2259 by Conchis Heaton RN  Body Position:   turned   left   tilted  Taken 8/8/2023 2111 by Conchis Heaton RN  Body Position: sitting  up in bed  Taken 8/8/2023 2059 by Conchis Heaton, RN  Body Position: dangle, side of bed  Skin Protection: tubing/devices free from skin contact  Intervention: Prevent and Manage VTE (Venous Thromboembolism) Risk  Recent Flowsheet Documentation  Taken 8/9/2023 0609 by Conchis Heaton, RN  Activity Management: activity encouraged  Taken 8/9/2023 0234 by Conchis Heaton, RN  Activity Management: activity encouraged  Taken 8/9/2023 0031 by Conchis Heaton, RN  Activity Management: activity encouraged  Taken 8/8/2023 2259 by Conchis Heaton, RN  Activity Management: activity encouraged  Taken 8/8/2023 2111 by Conchis Heaton, RN  Activity Management: up to bedside commode  Taken 8/8/2023 2059 by Conchis Heaton, RN  Activity Management: activity encouraged  VTE Prevention/Management:   bilateral   sequential compression devices on  Intervention: Prevent Infection  Recent Flowsheet Documentation  Taken 8/9/2023 0609 by Conchis Heaton RN  Infection Prevention: rest/sleep promoted  Taken 8/9/2023 0455 by Conchis Heaton RN  Infection Prevention: rest/sleep promoted  Taken 8/9/2023 0234 by Conchis Heaton RN  Infection Prevention: rest/sleep promoted  Taken 8/9/2023 0031 by Conchis Heaton RN  Infection Prevention: rest/sleep promoted  Taken 8/8/2023 2059 by Conchis Heaton RN  Infection Prevention: rest/sleep promoted  Goal: Optimal Comfort and Wellbeing  Outcome: Ongoing, Progressing  Intervention: Monitor Pain and Promote Comfort  Recent Flowsheet Documentation  Taken 8/9/2023 0519 by Conchis Heaton, RN  Pain Management Interventions:   see MAR   quiet environment facilitated  Taken 8/9/2023 0126 by Conchis Heaton RN  Pain Management Interventions:   see MAR   quiet environment facilitated   pain management plan reviewed with patient/caregiver   position adjusted   pillow support provided  Taken 8/8/2023 2059 by Conchis Heaton, RN  Pain  Management Interventions:   see MAR   quiet environment facilitated  Intervention: Provide Person-Centered Care  Recent Flowsheet Documentation  Taken 8/8/2023 2059 by Conchis Heaton, RN  Trust Relationship/Rapport:   care explained   emotional support provided   thoughts/feelings acknowledged   reassurance provided   questions encouraged  Goal: Readiness for Transition of Care  Outcome: Ongoing, Progressing

## 2023-08-09 NOTE — PROGRESS NOTES
"Daily progress note    Primary care physician      Subjective  Doing same with no new complaints except severe weakness and agreeable to go to subacute rehab.    History of present illness  89-year-old white female with very complex past medical history and well-known to our service including ITP chronic low back pain with L5 vertebral body fracture degenerative disease paroxysmal atrial fibrillation DVT diabetes hypertension hyperlipidemia hypothyroidism congestive heart failure and chronic kidney disease stage III who lives at home with her daughter brought to the emergency room with generalized weakness and bruises all over.  Patient work-up in ER revealed worsening thrombocytopenia admitted for management.  Patient denies any fever chills chest pain shortness of breath abdominal pain nausea vomiting diarrhea.  Patient is DNR per her wishes.     REVIEW OF SYSTEMS  Unremarkable      PHYSICAL EXAM  Blood pressure 161/56, pulse 64, temperature 97.5 øF (36.4 øC), temperature source Oral, resp. rate 16, height 144.8 cm (57.01\"), weight 80.1 kg (176 lb 9.4 oz), SpO2 94 %.    Constitutional:       General: She is not in acute distress.     Appearance: She is well-developed.   HENT:      Head: Normocephalic and atraumatic.   Eyes:      Extraocular Movements: Extraocular movements intact.   Cardiovascular:      Rate and Rhythm: Normal rate and regular rhythm.      Heart sounds: Normal heart sounds.   Pulmonary:      Effort: Pulmonary effort is normal.      Breath sounds: Normal breath sounds.   Abdominal:      General: There is no distension.      Comments: Erythematous macular clusters noted along the left flank   Genitourinary:     Comments: Indwelling Hoffman catheter  Skin:     General: Skin is warm.   Neurological:      General: No focal deficit present.      Mental Status: She is alert and oriented to person, place, and time.   Psychiatric:         Mood and Affect: Mood normal.      LAB RESULTS  Lab Results " (last 24 hours)       Procedure Component Value Units Date/Time    POC Glucose Once [[id number]]  (Abnormal) Collected: 08/09/23 1227    Specimen: Blood Updated: 08/09/23 1239     Glucose 208 mg/dL      Comment: Meter: [id number] : [id number] Eliseo Mckeonchas JOHNSON       POC Glucose Once [[id number]]  (Normal) Collected: 08/09/23 0811    Specimen: Blood Updated: 08/09/23 0821     Glucose 105 mg/dL      Comment: Meter: [id number] : [id number] Eliseo JOHNSON       CBC & Differential [[id number]]  (Abnormal) Collected: 08/09/23 0624    Specimen: Blood Updated: 08/09/23 0735    Narrative:      The following orders were created for panel order CBC & Differential.  Procedure                               Abnormality         Status                     ---------                               -----------         ------                     CBC Auto Differential[[id number]]        Abnormal            Final result                 Please view results for these tests on the individual orders.    CBC Auto Differential [[id number]]  (Abnormal) Collected: 08/09/23 0624    Specimen: Blood Updated: 08/09/23 0735     WBC 8.10 10*3/mm3      RBC 3.60 10*6/mm3      Hemoglobin 10.3 g/dL      Hematocrit 30.3 %      MCV 84.2 fL      MCH 28.6 pg      MCHC 34.0 g/dL      RDW 18.4 %      RDW-SD 54.4 fl      MPV 10.6 fL      Platelets 97 10*3/mm3      Neutrophil % 62.7 %      Lymphocyte % 23.5 %      Monocyte % 8.6 %      Eosinophil % 0.5 %      Basophil % 0.1 %      Neutrophils, Absolute 5.08 10*3/mm3      Lymphocytes, Absolute 1.90 10*3/mm3      Monocytes, Absolute 0.70 10*3/mm3      Eosinophils, Absolute 0.04 10*3/mm3      Basophils, Absolute 0.01 10*3/mm3     Basic Metabolic Panel [[id number]]  (Abnormal) Collected: 08/09/23 0624    Specimen: Blood Updated: 08/09/23 0708     Glucose 119 mg/dL      BUN 40 mg/dL      Creatinine 1.24 mg/dL      Sodium 132 mmol/L      Potassium 4.5 mmol/L      Comment: Slight hemolysis detected by analyzer.  Results may be affected.        Chloride 100 mmol/L      CO2 23.0 mmol/L      Calcium 7.6 mg/dL      BUN/Creatinine Ratio 32.3     Anion Gap 9.0 mmol/L      eGFR 40.9 mL/min/1.73     Narrative:      GFR Normal >60  Chronic Kidney Disease <60  Kidney Failure <15    The GFR formula is only valid for adults with stable renal function between ages 18 and 70.    POC Glucose Once [[id number]]  (Abnormal) Collected: 08/08/23 2119    Specimen: Blood Updated: 08/08/23 2120     Glucose 211 mg/dL      Comment: Meter: [id number] : [id number] Randell Patel NA       POC Glucose Once [[id number]]  (Abnormal) Collected: 08/08/23 1525    Specimen: Blood Updated: 08/08/23 1527     Glucose 165 mg/dL      Comment: Meter: [id number] : [id number] Sarwat Neftali ELIZABETH             Imaging Results (Last 24 Hours)       ** No results found for the last 24 hours. **            Current Facility-Administered Medications:     bisacodyl (DULCOLAX) suppository 10 mg, 10 mg, Rectal, Daily PRN, Pablo Sherman Jr., MD    budesonide-formoterol (SYMBICORT) 160-4.5 MCG/ACT inhaler 2 puff, 2 puff, Inhalation, BID, Fred Jeffrey MD, 2 puff at 08/09/23 0721    carvedilol (COREG) tablet 3.125 mg, 3.125 mg, Oral, BID With Meals, Mango Arnold MD, 3.125 mg at 08/09/23 0900    cholecalciferol (VITAMIN D3) tablet 1,000 Units, 1,000 Units, Oral, Daily, Fred Jeffrey MD, 1,000 Units at 08/09/23 0900    gabapentin (NEURONTIN) capsule 600 mg, 600 mg, Oral, Q12H, Fred Jeffrey MD, 600 mg at 08/09/23 0900    guaiFENesin (MUCINEX) 12 hr tablet 600 mg, 600 mg, Oral, Q12H, Mango Arnold MD, 600 mg at 08/09/23 0900    hydrALAZINE (APRESOLINE) tablet 100 mg, 100 mg, Oral, TID, Mango Arnold MD, 100 mg at 08/09/23 0900    HYDROcodone-acetaminophen (NORCO) 7.5-325 MG per tablet 1 tablet, 1 tablet, Oral, Q4H PRN, Mango Arnold MD, 1 tablet at 08/09/23 0520    insulin glargine (LANTUS, SEMGLEE) injection 36 Units, 36 Units, Subcutaneous, Nightly, Mango Arnold,  MD, 36 Units at 08/08/23 2226    insulin lispro (HUMALOG/ADMELOG) injection 12 Units, 12 Units, Subcutaneous, TID With Meals, Mango Arnold MD, 12 Units at 08/08/23 1740    insulin lispro (HUMALOG/ADMELOG) injection 2-7 Units, 2-7 Units, Subcutaneous, 4x Daily AC & at Bedtime, Mango Arnold MD, 3 Units at 08/09/23 1236    levothyroxine (SYNTHROID, LEVOTHROID) tablet 25 mcg, 25 mcg, Oral, QAM, Fred Jeffrey MD, 25 mcg at 08/09/23 0519    lidocaine (LIDODERM) 5 % 2 patch, 2 patch, Transdermal, Q24H, Tomer Jeong MD, 2 patch at 08/09/23 1042    LORazepam (ATIVAN) tablet 1 mg, 1 mg, Oral, Q PM, Fred Jeffrey MD, 1 mg at 08/08/23 2102    montelukast (SINGULAIR) tablet 10 mg, 10 mg, Oral, Nightly, Fred Jeffrey MD, 10 mg at 08/08/23 2102    ondansetron (ZOFRAN) injection 4 mg, 4 mg, Intravenous, Q6H PRN, Fred Jeffrey MD, 4 mg at 08/01/23 2044    pantoprazole (PROTONIX) EC tablet 40 mg, 40 mg, Oral, Q AM, Fred Jeffrey MD, 40 mg at 08/09/23 0520    polyethylene glycol (MIRALAX) packet 17 g, 17 g, Oral, Daily, Pablo Sherman Jr., MD, 17 g at 08/09/23 0859    predniSONE (DELTASONE) tablet 20 mg, 20 mg, Oral, Daily With Breakfast, Pablo Sherman Jr., MD, 20 mg at 08/09/23 0900    sennosides-docusate (PERICOLACE) 8.6-50 MG per tablet 2 tablet, 2 tablet, Oral, BID, Mango Arnold MD, 2 tablet at 08/09/23 0859    tamsulosin (FLOMAX) 24 hr capsule 0.4 mg, 0.4 mg, Oral, Daily, Fred Jeffrey MD, 0.4 mg at 08/09/23 0900    valACYclovir (VALTREX) tablet 1,000 mg, 1,000 mg, Oral, Q24H, Tomer Jeong MD, 1,000 mg at 08/09/23 0900    vitamin B-12 (CYANOCOBALAMIN) tablet 1,000 mcg, 1,000 mcg, Oral, Daily, Pablo Sherman Jr., MD, 1,000 mcg at 08/09/23 0900     ASSESSMENT  Chronic ITP   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 Sherman  Subacute rehab once bed available    KADEEM RIVAS MD    Copied text in this note has been reviewed and is accurate as of 08/09/23

## 2023-08-09 NOTE — PLAN OF CARE
Goal Outcome Evaluation:  Plan of Care Reviewed With: (P) patient        Progress: (P) improving  Outcome Evaluation: (P) Pt was seen for PT treatment this PM. Pt UIC upon arrival with family present. Pt demonstrated improved tolerance today. CGAx1 with use of RW for STS transfer. Pt ambulated with RW and WC follow within room- required frequent rest breaks. Pt demonstrates significant foward flexed posture, decreased gait speed, decreased step length, and decreased endurance. Pt will continue to benefit from skilled PT interventions to improve overall functional mobility and endurance. Rec SNF or home with 24 hour assist.      Anticipated Discharge Disposition (PT): (P) skilled nursing facility, home with 24/7 care, home with assist   -hx vertigo pt takes prn Meclizine at home  -give prn Meclizine  -monitor symptoms  -fall precaution.

## 2023-08-09 NOTE — THERAPY TREATMENT NOTE
Patient Name: Helena Carmen  : 1931    MRN: [id number]                              Today's Date: 2023       Admit Date: 2023    Visit Dx:     ICD-10-CM ICD-9-CM   1. Uncontrolled hypertension  I10 401.9   2. Thrombocytopenia  D69.6 287.5   3. Hyponatremia  E87.1 276.1   4. History of ITP  Z86.2 V12.3   5. Rash (left flank)  R21 782.1     Patient Active Problem List   Diagnosis    Aortic valve stenosis    Fatigue    MI (mitral incompetence)    PVC (premature ventricular contraction)    Legally blind    Essential hypertension    Hypertriglyceridemia    Pulmonary hypertension    Paroxysmal atrial fibrillation    Anxiety    Stage 4 chronic kidney disease    Chronic pain disorder    Degeneration of lumbar intervertebral disc    Type 2 diabetes mellitus with hyperglycemia    Esophageal reflux    Gastroparesis    Hiatal hernia    Iatrogenic hypothyroidism    Macular degeneration    Malignant neoplasm of uterus    Osteoarthritis of knee    Peripheral nerve disease    Secondary hyperparathyroidism    Thrombocytopenia    Chronic heart failure with preserved ejection fraction    Other cirrhosis of liver    Hypothyroidism    Nonrheumatic tricuspid valve regurgitation    Anemia, chronic disease    Hyponatremia    Closed fracture of fifth lumbar vertebra    Closed fracture of fifth lumbar vertebra, unspecified fracture morphology, initial encounter    Diabetic polyneuropathy associated with type 2 diabetes mellitus    Chronic ITP (idiopathic thrombocytopenia)    Chronic midline low back pain with bilateral sciatica    Acute deep vein thrombosis of calf, bilateral    Compression fracture of L5 vertebra with routine healing    Acute left-sided low back pain with left-sided sciatica    Urine retention    Rib fracture    Acute on chronic combined systolic and diastolic congestive heart failure    Acute UTI    Blindness right eye category 3, blindness left eye category 3    Cognitive communication deficit     Pseudomonas (aeruginosa) (mallei) (pseudomallei) as the cause of diseases classified elsewhere    Multifocal pneumonia     Past Medical History:   Diagnosis Date    Aortic valve stenosis     s/p tissue AVR    Back pain     CKD (chronic kidney disease)     Colitis due to Clostridioides difficile 01/26/2022    Diastolic dysfunction     Grade 2 per echocardiogram 2013    Diverticulosis     Exertional shortness of breath     Heart disease     Hiatal hernia     Hyperlipidemia     Hypertension     Hyperthyroidism     Hypertriglyceridemia 05/31/2018    Hypothyroidism     L5 compression fracture (HCC) 08/2022    Left ventricular hypertrophy     Legally blind     Liver disease     Low back pain     Macular degeneration     Mitral regurgitation     Osteoarthritis of hip     Osteoporosis     Pancreatitis 01/26/2022    Paroxysmal atrial fibrillation     Peripheral neuropathy     Premature ventricular contractions     Pulmonary hypertension     Renal insufficiency syndrome     Type 2 diabetes mellitus     Uterine cancer      Past Surgical History:   Procedure Laterality Date    AORTIC VALVE REPAIR/REPLACEMENT      APPENDECTOMY      CATARACT EXTRACTION      1970, 1999    CHOLECYSTECTOMY      COLONOSCOPY      ENDOSCOPY  08/15/2014    no gross lesions in stomach/duodenum, erythrematous mucosa in stomach    EPIDURAL BLOCK      HYSTERECTOMY  2007    STERNOTOMY      UPPER GASTROINTESTINAL ENDOSCOPY        General Information       Row Name 08/09/23 1459          OT Time and Intention    Document Type therapy note (daily note)  -JW     Mode of Treatment occupational therapy;co-treatment;physical therapy  -       Row Name 08/09/23 1459          General Information    Patient Profile Reviewed yes  -JW     Existing Precautions/Restrictions fall  -     Barriers to Rehab visual deficit  legally blind  -       Row Name 08/09/23 1459          Cognition    Orientation Status (Cognition) oriented x 3  -       Row Name 08/09/23 1452           Safety Issues, Functional Mobility    Impairments Affecting Function (Mobility) balance;endurance/activity tolerance;postural/trunk control;strength;visual/perceptual  -               User Key  (r) = Recorded By, (t) = Taken By, (c) = Cosigned By      Initials Name Provider Type    Yris Echavarria OT Occupational Therapist                     Mobility/ADL's       Row Name 08/09/23 1500          Bed Mobility    Comment, (Bed Mobility) UIC  -       Row Name 08/09/23 1500          Sit-Stand Transfer    Sit-Stand [place] (Transfers) contact guard  -     Assistive Device (Sit-Stand Transfers) walker, front-wheeled  -     Comment, (Sit-Stand Transfer) CGA STS from chair 4x using RW. Cues for hand placement  -       Row Name 08/09/23 1500          Functional Mobility    Functional Mobility- Ind. Level contact guard assist;minimum assist (75% patient effort)  -     Functional Mobility- Device walker, front-wheeled  -     Functional Mobility- Comment CGA for fxl ambulation from chair to in room sink ~20ft with 2 seated rest breaks (followed with chair). Kyphotic posture, heavy use of RW.  -       Row Name 08/09/23 1500          Grooming Assessment/Training    [place] Level (Grooming) oral care regimen;wash face, hands;set up;supervision  -     Position (Grooming) supported sitting;sink side;supported standing  -               User Key  (r) = Recorded By, (t) = Taken By, (c) = Cosigned By      Initials Name Provider Type    Yris Echavarria OT Occupational Therapist                   Obj/Interventions       Row Name 08/09/23 1501          Motor Skills    Functional Endurance requires multiple rest breaks with OOB ax d/t dec act tolerance.  -               User Key  (r) = Recorded By, (t) = Taken By, (c) = Cosigned By      Initials Name Provider Type    Yris Echavarria OT Occupational Therapist                   Goals/Plan    No documentation.                  Clinical Impression       Row  Name 08/09/23 1502          Pain Assessment    Pre/Posttreatment Pain Comment reports pain in lower back/buttocks from shingles rash  -     Pain Intervention(s) Repositioned;Ambulation/increased activity  -       Row Name 08/09/23 1502          Plan of Care Review    Plan of Care Reviewed With patient  -AHMET     Progress improving  -     Outcome Evaluation Pt was found sitting UIC with family present. Pt demo's improvement in activity tolerance today. She is able to stand with CGA and use RW to amb in room to the sink with 2 seated rest breaks provided d/t fatigue. Cues for navigation provided. She completes simple grooming task standing at the sink with UE support on counter. Fatigued after session but pleasant about improvement today. Will cont to follow  -AHMET       Row Name 08/09/23 1502          Therapy Plan Review/Discharge Plan (OT)    Anticipated Discharge Disposition (OT) home with 24/7 care;home with home health  vs SNF  -       Row Name 08/09/23 1502          Positioning and Restraints    Pre-Treatment Position sitting in chair/recliner  -     Post Treatment Position chair  -JW     In Chair sitting;call light within reach;encouraged to call for assist;exit alarm on;with family/caregiver;legs elevated  -               User Key  (r) = Recorded By, (t) = Taken By, (c) = Cosigned By      Initials Name Provider Type    Yris Echavarria OT Occupational Therapist                   Outcome Measures       Row Name 08/09/23 1444 08/09/23 0855       How much help from another person do you currently need...    Turning from your back to your side while in flat bed without using bedrails? 3 (P)   -SK 3  -AR    Moving from lying on back to sitting on the side of a flat bed without bedrails? 2 (P)   -SK 2  -AR    Moving to and from a bed to a chair (including a wheelchair)? 3 (P)   -SK 3  -AR    Standing up from a chair using your arms (e.g., wheelchair, bedside chair)? 3 (P)   -SK 3  -AR    Climbing 3-5 steps  with a railing? 1 (P)   -SK 1  -AR    To walk in hospital room? 3 (P)   -SK 3  -AR    AM-PAC 6 Clicks Score (PT) 15 (P)   -SK 15  -AR    Highest level of mobility 4 --> Transferred to chair/commode (P)   -SK 4 --> Transferred to chair/commode  -AR      Row Name 08/09/23 1444          Functional Assessment    Outcome Measure Options AM-PAC 6 Clicks Basic Mobility (PT) (P)   -SK               User Key  (r) = Recorded By, (t) = Taken By, (c) = Cosigned By      Initials Name Provider Type    Juliet Celestin, RN Registered Nurse    Jocy Olivera, PT Student PT Student                    Occupational Therapy Education       Title: PT OT SLP Therapies (In Progress)       Topic: Occupational Therapy (In Progress)       Point: ADL training (Done)       Description:   Instruct learner(s) on proper safety adaptation and remediation techniques during self care or transfers.   Instruct in proper use of assistive devices.                  Learning Progress Summary             Patient Acceptance, E, VU by AHMET at 8/2/2023 1249    Comment: role of OT, plan of care, safety                         Point: Home exercise program (Not Started)       Description:   Instruct learner(s) on appropriate technique for monitoring, assisting and/or progressing therapeutic exercises/activities.                  Learner Progress:  Not documented in this visit.              Point: Precautions (Done)       Description:   Instruct learner(s) on prescribed precautions during self-care and functional transfers.                  Learning Progress Summary             Patient Acceptance, E, VU by AHMET at 8/2/2023 1249    Comment: role of OT, plan of care, safety                         Point: Body mechanics (Done)       Description:   Instruct learner(s) on proper positioning and spine alignment during self-care, functional mobility activities and/or exercises.                  Learning Progress Summary             Patient Acceptance, E, VU by AHMET at  8/2/2023 0396    Comment: role of OT, plan of care, safety                                         User Key       Initials Effective Dates Name Provider Type Discipline     06/10/21 -  Yris Cho, CHERRY Occupational Therapist OT                  OT Recommendation and Plan  Planned Therapy Interventions (OT): activity tolerance training, BADL retraining, functional balance retraining, occupation/activity based interventions, patient/caregiver education/training, transfer/mobility retraining, strengthening exercise  Therapy Frequency (OT): 3 times/wk  Plan of Care Review  Plan of Care Reviewed With: patient  Progress: improving  Outcome Evaluation: Pt was found sitting UIC with family present. Pt demo's improvement in activity tolerance today. She is able to stand with CGA and use RW to amb in room to the sink with 2 seated rest breaks provided d/t fatigue. Cues for navigation provided. She completes simple grooming task standing at the sink with UE support on counter. Fatigued after session but pleasant about improvement today. Will cont to follow     Time Calculation:   Evaluation Complexity (OT)  Review Occupational Profile/Medical/Therapy History Complexity: brief/low complexity  Assessment, Occupational Performance/Identification of Deficit Complexity: 1-3 performance deficits  Clinical Decision Making Complexity (OT): problem focused assessment/low complexity  Overall Complexity of Evaluation (OT): low complexity     Time Calculation- OT       Row Name 08/09/23 1505             Time Calculation- OT    OT Start Time 1402  -      OT Stop Time 1426  -      OT Time Calculation (min) 24 min  -      Total Timed Code Minutes- OT 24 minute(s)  -      OT Received On 08/09/23  -      OT - Next Appointment 08/10/23  -         Timed Charges    45559 - OT Therapeutic Activity Minutes 14  -      60798 - OT Self Care/Mgmt Minutes 10  -         Total Minutes    Timed Charges Total Minutes 24  -       Total  Minutes 24  -JW                User Key  (r) = Recorded By, (t) = Taken By, (c) = Cosigned By      Initials Name Provider Type    Yris Echavarria OT Occupational Therapist                  Therapy Charges for Today       Code Description Service Date Service Provider Modifiers Qty    45429727206 HC OT THERAPEUTIC ACT EA 15 MIN 8/8/2023 Yris Cho OT GO 1    16653107247 HC OT SELF CARE/MGMT/TRAIN EA 15 MIN 8/8/2023 Yris Cho OT GO 1    69705522949 HC OT THERAPEUTIC ACT EA 15 MIN 8/9/2023 Yris Cho OT GO 1    80866977103 HC OT SELF CARE/MGMT/TRAIN EA 15 MIN 8/9/2023 Yris Cho OT GO 1                 Yris Cho OT  8/9/2023

## 2023-08-09 NOTE — PROGRESS NOTES
[facility] INPATIENT PROGRESS NOTE    Length of Stay:  8 days    CHIEF COMPLAINT:  Thrombocytopenia, B12 deficiency, CKD4, shingles, prior hepatitis B infection    SUBJECTIVE:   Patient notes that she has had 3 loose to diarrhea bowel movements this morning.  She is much more fatigued today.    ROS:  Review of Systems   A comprehensive review of systems was obtained with pertinent positive findings as noted in the interval history above.  All other systems negative.      OBJECTIVE:  Vitals:    08/08/23 2103 08/09/23 0518 08/09/23 0721 08/09/23 0810   BP: 173/70 160/63  171/63   BP Location: Left arm Left arm  Left arm   Patient Position: Lying Lying  Lying   Pulse: 61 60 60 54   Resp: 20 18 16 16   Temp: 97.1 øF (36.2 øC) 96.9 øF (36.1 øC)  97 øF (36.1 øC)   TempSrc: Oral Oral  Oral   SpO2: 98% 97% 97% 98%   Weight:       Height:             PHYSICAL EXAMINATION:  General: Elderly female sitting in reclining chair  Chest/Lungs: Clear to auscultation bilaterally  Heart: Irregularly irregular  Abdomen/GI:  mildly distended, slightly tender  Extremities: Trace-1+ bilateral lower extremity edema  Skin: Shingles rash has improved, lesions crusted, improving    Patient was examined today, unchanged from above    DIAGNOSTIC DATA:  Results Review:     I reviewed the patient's new clinical results.    Results from last 7 days   Lab Units 08/09/23  0624 08/08/23  0641 08/07/23  0738   WBC 10*3/mm3 8.10 8.07 7.90   HEMOGLOBIN g/dL 10.3* 10.0* 10.4*   HEMATOCRIT % 30.3* 30.6* 31.9*   PLATELETS 10*3/mm3 97* 88* 86*        Results from last 7 days   Lab Units 08/09/23  0624 08/08/23  0641 08/07/23  0738 08/06/23  0550 08/05/23  0701 08/04/23  0743   SODIUM mmol/L 132* 136 133* 130* 133* 128*   POTASSIUM mmol/L 4.5 4.7 5.1 4.6 4.6 4.2   CHLORIDE mmol/L 100 104 102 101 101 99   CO2 mmol/L 23.0 23.2 21.6* 20.8* 22.9 20.0*   BUN mg/dL 40* 44* 46* 43* 41* 37*   CREATININE mg/dL 1.24* 1.36* 1.56* 1.41* 1.32* 1.44*    CALCIUM mg/dL 7.6* 8.4 8.2 8.2 8.4 8.1*   BILIRUBIN mg/dL  --   --   --  0.5 0.4 0.3   ALK PHOS U/L  --   --   --  31* 28* 35*   ALT (SGPT) U/L  --   --   --  21 13 11   AST (SGOT) U/L  --   --   --  17 16 14   GLUCOSE mg/dL 119* 155* 134* 267* 142* 275*        Lab Results   Component Value Date    NEUTROABS 5.08 08/09/2023                     Assessment & Plan   ASSESSMENT/PLAN:  This is a 89 y.o. female with:     *Severe thrombocytopenia.  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  She has chronic liver disease which might be contributing.  She was previously treated with IVIG with good response.  Spleen was not enlarged on CT on 5/1/2023.  Previously elected to maintain patient on low-dose prednisone to maintain platelet count at least in the 50-24470 range, previously receiving prednisone 10 mg daily with relatively stable platelet count  She was found to have a new bruise over the abdomen with no preceding trauma.  7/31/2023: Platelet count 17,000.  Patient was given Solu-Medrol 125 mg IV.  8/1/2023: Platelet count 16,000.  IPF: 15.3%.  7/31/2023: PT and PTT were normal.  8/2/2023: Platelet count 28,000.  8/3/2023: Platelet count 39,000.  8/4/2023: Platelet count 48,000.  Rituxan was initially recommended.  However, she was found to have positive hepatitis B core antibody.  Therefore, Rituxan was not recommended.  Per GI, patient at risk for reactivation of hepatitis B and would require treatment with tenofovir if rituximab pursued.  Would not recommend use of Promacta due to risk of hepatotoxicity in the setting of ongoing question regarding patient having chronic liver disease.  IVIG 45 g (1 g/kg ideal body weight) daily x2 days was started on 8/4/2023.  Solu-Medrol dose was reduced to 60 mg daily on 8/4/2023.  8/5/2023: Platelet count 57,000.  8/6/2023: Platelet count 65,000.  Solu-Medrol dose decreased to 40 mg daily on 8/6/2023 8/7/2023: Platelet count 86,000.    Transitioned  from Solu-Medrol 40 mg daily to prednisone 20 mg daily on 8/8/2023 8/8/2023: Platelet count 88,000.    8/9/2023: Platelet count 97,000  For now we will continue on prednisone 20 mg daily.  Options when effects of IVIG begin to decline in the next 2 to 4 weeks would be repeat dosing of IVIG in the outpatient setting versus consideration of Nplate.  Nplate however would be difficult regarding frequency of visits and patient's limited mobility.     *Vitamin B12 deficiency.  Vitamin B12 was 236 on 3/12/2023.  She has not been taking vitamin B12.  8/1/2023: Vitamin B12 287.  Patient started on vitamin B12 injections daily on 8/2/2023 x 5 days.  Subsequently resumed oral B12 1000 mcg daily    *Anemia  Most recent anemia evaluation 3/12/2023 with iron 135, ferritin 295, iron saturation 51%, TIBC 264  Significant component of anemia secondary to chronic disease and CKD  See above regarding B12 deficiency  Folate level normal at 14.5 on 8/1/2023  Hemoglobin fairly stable in the 10-11 range  Additional labs on 8/7/2023 with iron 120, ferritin 340, iron saturation 42%, TIBC 285.  Consistent with anemia secondary to chronic disease and CKD  Hemoglobin today improved at 10.3     *Chronic kidney disease, stage 4.  Creatinine in the 1.3-2.0 range     *Shingles affecting the left side of the abdomen.  She was started on valacyclovir 1 g once daily-dose reduced due to decreased kidney function.  There was some worsening of the rash on 8/3/2023.  Valacyclovir 1 g daily x14 days initiated 8/2/2023  Improving     *Vaginal yeast infection  Patient had a recent UTI and was treated with levofloxacin.  Patient developed vaginal yeast infection following the antibiotic therapy and was given 1 dose of Diflucan     Dose #2 was given on 8/3/2023.     *Positive Hepatitis B core Ab.  No known prior history of hepatitis.   Imaging studies previously noted to be consistent with cirrhotic appearance of liver  Patient was seen by GI, additional  laboratory studies obtained on 8/4/23 including positive hepatitis B E antibody, negative hepatitis B E antigen negative HBV PCR, positive hepatitis A antibody, negative hepatitis A IgM, negative hepatitis C antibody  Per GI, patient at risk for reactivation of hepatitis B if treated with rituximab.  Recommended use of concurrent tenofovir if rituximab is pursued in the future.    *GI prophylaxis  Protonix 40 mg daily    *Possible aspiration  Patient undergoing evaluation per hospitalist    *Constipation  Currently receiving Amanda-Colace 2 tablets twice daily and MiraLAX daily  Patient still with no bowel movement, will attempt Dulcolax suppository and if ineffective try fleets enema today.    *CODE STATUS  Patient is DNR, continue current level of support    *Disposition  Patient's family is investigating options for subacute nursing facility versus return home with outside nursing assistance.      PLAN:     Continue Amanda-Colace 2 tablets twice daily and MiraLAX daily  Dulcolax 10 mg suppository and if ineffective fleets enema today.  Continue prednisone 20 mg daily   Continue oral B12 1000 mcg daily  Continue Valtrex 1000 mg daily x14 days (initiated 8/2/2023) for shingles  Options when effects of IVIG begin to decline in the next 2 to 4 weeks would be repeat dosing of IVIG in the outpatient setting versus consideration of Nplate.  Nplate however would be difficult regarding frequency of visits and patient's limited mobility.  Patient acceptable for discharge home from hematology standpoint   Disposition currently pending regarding home versus rehab  When patient is ready for discharge we will make arrangements for outpatient follow-up  Daily CBC    Discussed with patient and family at bedside.  Discussed with Dr. Clayton Sherman MD

## 2023-08-09 NOTE — CASE MANAGEMENT/SOCIAL WORK
Continued Stay Note  [facility]     Patient Name: Helena Carmen  MRN: [id number]  Today's Date: 8/9/2023    Admit Date: 7/31/2023    Plan: Home with VNA and family vs SNF.   Discharge Plan       Row Name 08/09/23 1606       Plan    Plan Home with VNA and family vs SNF.    Patient/Family in Agreement with Plan yes    Plan Comments Spoke with daughter at bedside. Introduced self. Provided family with SNF list. Family is unsure if they will be able to provide the care the patient will need, but are reluctant on sending back to SNF.                   Discharge Codes    No documentation.                 Expected Discharge Date and Time       Expected Discharge Date Expected Discharge Time    Aug 10, 2023               Beth Gao RN

## 2023-08-09 NOTE — THERAPY TREATMENT NOTE
Patient Name: Helena Carmen  : 1931    MRN: [id number]                              Today's Date: 2023       Admit Date: 2023    Visit Dx:     ICD-10-CM ICD-9-CM   1. Uncontrolled hypertension  I10 401.9   2. Thrombocytopenia  D69.6 287.5   3. Hyponatremia  E87.1 276.1   4. History of ITP  Z86.2 V12.3   5. Rash (left flank)  R21 782.1     Patient Active Problem List   Diagnosis    Aortic valve stenosis    Fatigue    MI (mitral incompetence)    PVC (premature ventricular contraction)    Legally blind    Essential hypertension    Hypertriglyceridemia    Pulmonary hypertension    Paroxysmal atrial fibrillation    Anxiety    Stage 4 chronic kidney disease    Chronic pain disorder    Degeneration of lumbar intervertebral disc    Type 2 diabetes mellitus with hyperglycemia    Esophageal reflux    Gastroparesis    Hiatal hernia    Iatrogenic hypothyroidism    Macular degeneration    Malignant neoplasm of uterus    Osteoarthritis of knee    Peripheral nerve disease    Secondary hyperparathyroidism    Thrombocytopenia    Chronic heart failure with preserved ejection fraction    Other cirrhosis of liver    Hypothyroidism    Nonrheumatic tricuspid valve regurgitation    Anemia, chronic disease    Hyponatremia    Closed fracture of fifth lumbar vertebra    Closed fracture of fifth lumbar vertebra, unspecified fracture morphology, initial encounter    Diabetic polyneuropathy associated with type 2 diabetes mellitus    Chronic ITP (idiopathic thrombocytopenia)    Chronic midline low back pain with bilateral sciatica    Acute deep vein thrombosis of calf, bilateral    Compression fracture of L5 vertebra with routine healing    Acute left-sided low back pain with left-sided sciatica    Urine retention    Rib fracture    Acute on chronic combined systolic and diastolic congestive heart failure    Acute UTI    Blindness right eye category 3, blindness left eye category 3    Cognitive communication deficit     Pseudomonas (aeruginosa) (mallei) (pseudomallei) as the cause of diseases classified elsewhere    Multifocal pneumonia     Past Medical History:   Diagnosis Date    Aortic valve stenosis     s/p tissue AVR    Back pain     CKD (chronic kidney disease)     Colitis due to Clostridioides difficile 01/26/2022    Diastolic dysfunction     Grade 2 per echocardiogram 2013    Diverticulosis     Exertional shortness of breath     Heart disease     Hiatal hernia     Hyperlipidemia     Hypertension     Hyperthyroidism     Hypertriglyceridemia 05/31/2018    Hypothyroidism     L5 compression fracture (HCC) 08/2022    Left ventricular hypertrophy     Legally blind     Liver disease     Low back pain     Macular degeneration     Mitral regurgitation     Osteoarthritis of hip     Osteoporosis     Pancreatitis 01/26/2022    Paroxysmal atrial fibrillation     Peripheral neuropathy     Premature ventricular contractions     Pulmonary hypertension     Renal insufficiency syndrome     Type 2 diabetes mellitus     Uterine cancer      Past Surgical History:   Procedure Laterality Date    AORTIC VALVE REPAIR/REPLACEMENT      APPENDECTOMY      CATARACT EXTRACTION      1970, 1999    CHOLECYSTECTOMY      COLONOSCOPY      ENDOSCOPY  08/15/2014    no gross lesions in stomach/duodenum, erythrematous mucosa in stomach    EPIDURAL BLOCK      HYSTERECTOMY  2007    STERNOTOMY      UPPER GASTROINTESTINAL ENDOSCOPY        General Information       Row Name 08/09/23 1430          Physical Therapy Time and Intention    Document Type therapy note (daily note) (P)   -SK     Mode of Treatment co-treatment (P)   -SK       Row Name 08/09/23 1430          General Information    Patient Profile Reviewed yes (P)   -SK     Prior Level of Function -- (P)   requires 24 hour assist at home.  -SK     Existing Precautions/Restrictions fall (P)   -SK     Barriers to Rehab visual deficit (P)   -SK       Row Name 08/09/23 1430          Living Environment    People in  Home child(edison), adult;other relative(s) (P)   -SK       Row Name 08/09/23 1430          Cognition    Orientation Status (Cognition) oriented x 4 (P)   -SK       Row Name 08/09/23 1430          Safety Issues, Functional Mobility    Impairments Affecting Function (Mobility) balance;endurance/activity tolerance;postural/trunk control;pain;strength (P)   -SK     Comment, Safety Issues/Impairments (Mobility) nonskid socks and gait belt donned. (P)   -SK               User Key  (r) = Recorded By, (t) = Taken By, (c) = Cosigned By      Initials Name Provider Type    Jocy Olivera, PT Student PT Student                   Mobility       Row Name 08/09/23 1432          Sit-Stand Transfer    Sit-Stand Allen (Transfers) contact guard;minimum assist (75% patient effort);verbal cues (P)   -SK     Assistive Device (Sit-Stand Transfers) walker, front-wheeled (P)   -SK     Comment, (Sit-Stand Transfer) MinAx1 progressing to CGAx1 for STS. VC for hand placement. Pt demo significant forward flexed posture in standing. (P)   -SK       Row Name 08/09/23 1432          Gait/Stairs (Locomotion)    Allen Level (Gait) verbal cues;1 person assist;contact guard;minimum assist (75% patient effort) (P)   Deandre when patient becomes fatigued; otherwise, CGAx1  -SK     Assistive Device (Gait) walker, front-wheeled (P)   -SK     Distance in Feet (Gait) ambulated within room with  follow- 6 ft with rest break, 10 ft with rest break, 12ft with rest break. (P)   -SK     Deviations/Abnormal Patterns (Gait) base of support, narrow;gait speed decreased;stride length decreased (P)   -SK     Bilateral Gait Deviations forward flexed posture (P)   -SK     Comment, (Gait/Stairs) Significant forward flexed posture; pt demonstrated improved ambulation distance today- continues to be limited by fatigue. (P)   -SK               User Key  (r) = Recorded By, (t) = Taken By, (c) = Cosigned By      Initials Name Provider Type    Jocy Olivera,  PT Student PT Student                   Obj/Interventions       Row Name 08/09/23 1439          Balance    Static Sitting Balance verbal cues;standby assist (P)   -SK     Position, Sitting Balance sitting in chair (P)   -SK     Sit to Stand Dynamic Balance verbal cues;contact guard;1-person assist (P)   -SK     Static Standing Balance verbal cues;contact guard (P)   -SK     Position/Device Used, Standing Balance supported;walker, front-wheeled (P)   -SK     Comment, Balance No overt LOB- pt demonstrates mild unsteadiness when fatigued. (P)   -SK               User Key  (r) = Recorded By, (t) = Taken By, (c) = Cosigned By      Initials Name Provider Type    Jocy Olivera, PT Student PT Student                   Goals/Plan    No documentation.                  Clinical Impression       Row Name 08/09/23 1442          Pain    Pain Location - back (P)   -SK     Pre/Posttreatment Pain Comment Pt stated she has pain in her back, but did not rate. (P)   -SK     Pain Intervention(s) Repositioned;Ambulation/increased activity (P)   -SK       Row Name 08/09/23 1442          Plan of Care Review    Plan of Care Reviewed With patient (P)   -SK     Progress improving (P)   -SK     Outcome Evaluation Pt was seen for PT treatment this PM. Pt [facility] upon arrival with family present. Pt demonstrated improved tolerance today. CGAx1 with use of RW for STS transfer. Pt ambulated with RW and WC follow within room- required frequent rest breaks. Pt demonstrates significant foward flexed posture, decreased gait speed, decreased step length, and decreased endurance. Pt will continue to benefit from skilled PT interventions to improve overall functional mobility and endurance. Rec SNF or home with 24 hour assist. (P)   -SK       Row Name 08/09/23 1442          Therapy Assessment/Plan (PT)    Rehab Potential (PT) good, to achieve stated therapy goals (P)   -SK     Criteria for Skilled Interventions Met (PT) yes (P)   -SK     Therapy  Frequency (PT) 5 times/wk (P)   -SK     Predicted Duration of Therapy Intervention (PT) 1 week (P)   -SK       Row Name 08/09/23 1442          Positioning and Restraints    Pre-Treatment Position sitting in chair/recliner (P)   -SK     Post Treatment Position chair (P)   -SK     In Chair notified nsg;reclined;call light within reach;encouraged to call for assist;exit alarm on (P)   -SK               User Key  (r) = Recorded By, (t) = Taken By, (c) = Cosigned By      Initials Name Provider Type    Jocy Olivera, PT Student PT Student                   Outcome Measures       Row Name 08/09/23 1444 08/09/23 0855       How much help from another person do you currently need...    Turning from your back to your side while in flat bed without using bedrails? 3 (P)   -SK 3  -AR    Moving from lying on back to sitting on the side of a flat bed without bedrails? 2 (P)   -SK 2  -AR    Moving to and from a bed to a chair (including a wheelchair)? 3 (P)   -SK 3  -AR    Standing up from a chair using your arms (e.g., wheelchair, bedside chair)? 3 (P)   -SK 3  -AR    Climbing 3-5 steps with a railing? 1 (P)   -SK 1  -AR    To walk in hospital room? 3 (P)   -SK 3  -AR    AM-PAC 6 Clicks Score (PT) 15 (P)   -SK 15  -AR    Highest level of mobility 4 --> Transferred to chair/commode (P)   -SK 4 --> Transferred to chair/commode  -AR      Row Name 08/09/23 1444          Functional Assessment    Outcome Measure Options AM-PAC 6 Clicks Basic Mobility (PT) (P)   -SK               User Key  (r) = Recorded By, (t) = Taken By, (c) = Cosigned By      Initials Name Provider Type    Juliet Celestin, RN Registered Nurse    Jocy Olivera, PT Student PT Student                                 Physical Therapy Education       Title: PT OT SLP Therapies (In Progress)       Topic: Physical Therapy (Done)       Point: Mobility training (Done)       Learning Progress Summary             Patient Acceptance, E,TB, VU,NR by SK at 8/9/2023  1606    Acceptance, E,TB, VU,NR by SK at 8/8/2023 1035    Acceptance, E, VU by  at 8/7/2023 1526    Acceptance, E,TB, VU,NR by SK at 8/4/2023 1452    Acceptance, E,TB, VU,NR by SK at 8/2/2023 1534                         Point: Home exercise program (Done)       Learning Progress Summary             Patient Acceptance, E,TB, VU,NR by SK at 8/8/2023 1035                         Point: Body mechanics (Done)       Learning Progress Summary             Patient Acceptance, E,TB, VU,NR by SK at 8/9/2023 1606    Acceptance, E,TB, VU,NR by SK at 8/8/2023 1035    Acceptance, E,TB, VU,NR by SK at 8/4/2023 1452    Acceptance, E,TB, VU,NR by SK at 8/2/2023 1534                         Point: Precautions (Done)       Learning Progress Summary             Patient Acceptance, E,TB, VU,NR by SK at 8/9/2023 1606    Acceptance, E,TB, VU,NR by SK at 8/8/2023 1035    Acceptance, E,TB, VU,NR by SK at 8/4/2023 1452    Acceptance, E,TB, VU,NR by SK at 8/2/2023 1534                                         User Key       Initials Effective Dates Name Provider Type Discipline     12/13/22 -  Trudy Mason, PT Physical Therapist PT    SK 07/28/23 -  Jocy Modi, CANDY Student PT Student PT                  PT Recommendation and Plan  Planned Therapy Interventions (PT): bed mobility training, gait training, balance training, patient/family education, postural re-education, strengthening, transfer training  Plan of Care Reviewed With: (P) patient  Progress: (P) improving  Outcome Evaluation: (P) Pt was seen for PT treatment this PM. Pt [facility] upon arrival with family present. Pt demonstrated improved tolerance today. CGAx1 with use of RW for STS transfer. Pt ambulated with RW and WC follow within room- required frequent rest breaks. Pt demonstrates significant foward flexed posture, decreased gait speed, decreased step length, and decreased endurance. Pt will continue to benefit from skilled PT interventions to improve overall functional  mobility and endurance. Rec SNF or home with 24 hour assist.     Time Calculation:         PT Charges       Row Name 08/09/23 1551             Time Calculation    Start Time 1403 (P)   -SK      Stop Time 1426 (P)   -SK      Time Calculation (min) 23 min (P)   -SK      PT Received On 08/09/23 (P)   -SK      PT - Next Appointment 08/10/23 (P)   -SK         Time Calculation- PT    Total Timed Code Minutes- PT 23 minute(s) (P)   -SK         Timed Charges    30754 - PT Therapeutic Activity Minutes 23 (P)   -SK         Total Minutes    Timed Charges Total Minutes 23 (P)   -SK       Total Minutes 23 (P)   -SK                User Key  (r) = Recorded By, (t) = Taken By, (c) = Cosigned By      Initials Name Provider Type    SK Jocy Modi, PT Student PT Student                  Therapy Charges for Today       Code Description Service Date Service Provider Modifiers Qty    [id number]  PT THER PROC EA 15 MIN 8/8/2023 Jocy Modi, PT Student GP 1    [id number] HC PT THERAPEUTIC ACT EA 15 MIN 8/9/2023 Jocy Modi, PT Student GP 2            PT G-Codes  Outcome Measure Options: (P) AM-PAC 6 Clicks Basic Mobility (PT)  AM-PAC 6 Clicks Score (PT): (P) 15  AM-PAC 6 Clicks Score (OT): 14  PT Discharge Summary  Anticipated Discharge Disposition (PT): (P) skilled nursing facility, home with 24/7 care, home with assist  Discharge Destination: Home with assist, Home with home health, SNF    Jocy Modi PT Student  8/9/2023

## 2023-08-09 NOTE — NURSING NOTE
CWON note: pt seen for evaluation of sacral skin. Pt is currently sitting in the chair, she is able to stand with her walker so I can assess her skin. She is alert and oriented, incontinent of bowel and bladder. Staff have been using barrier cream, and pt is able to assist with turns and repositioning. Sacral skin has slowly blanchable erythema with one small maroon area to right gluteal, which is also slowly blanchable. I have asked the staff to place a sacral Optifoam dressing for protection and she is using a waffle cushion while sitting. We will reassess her skin tomorrow if pt remains in-patient. Wound care and prevention standing orders placed into [facility]. Discussed with RN.

## 2023-08-10 ENCOUNTER — READMISSION MANAGEMENT (OUTPATIENT)
Dept: CALL CENTER | Facility: HOSPITAL | Age: 88
End: 2023-08-10
Payer: MEDICARE

## 2023-08-10 VITALS
BODY MASS INDEX: 38.1 KG/M2 | TEMPERATURE: 97.2 F | HEIGHT: 57 IN | SYSTOLIC BLOOD PRESSURE: 168 MMHG | HEART RATE: 68 BPM | DIASTOLIC BLOOD PRESSURE: 70 MMHG | RESPIRATION RATE: 18 BRPM | OXYGEN SATURATION: 96 % | WEIGHT: 176.59 LBS

## 2023-08-10 DIAGNOSIS — D69.6 THROMBOCYTOPENIA: Primary | ICD-10-CM

## 2023-08-10 DIAGNOSIS — Z86.2 HISTORY OF ITP: ICD-10-CM

## 2023-08-10 LAB
ANION GAP SERPL CALCULATED.3IONS-SCNC: 10 MMOL/L (ref 5–15)
BASOPHILS # BLD AUTO: 0.01 10*3/MM3 (ref 0–0.2)
BASOPHILS NFR BLD AUTO: 0.1 % (ref 0–1.5)
BUN SERPL-MCNC: 43 MG/DL (ref 8–23)
BUN/CREAT SERPL: 31.2 (ref 7–25)
CALCIUM SPEC-SCNC: 8 MG/DL (ref 8.2–9.6)
CHLORIDE SERPL-SCNC: 100 MMOL/L (ref 98–107)
CO2 SERPL-SCNC: 21 MMOL/L (ref 22–29)
CREAT SERPL-MCNC: 1.38 MG/DL (ref 0.57–1)
DEPRECATED RDW RBC AUTO: 54.3 FL (ref 37–54)
EGFRCR SERPLBLD CKD-EPI 2021: 36 ML/MIN/1.73
EOSINOPHIL # BLD AUTO: 0.02 10*3/MM3 (ref 0–0.4)
EOSINOPHIL NFR BLD AUTO: 0.3 % (ref 0.3–6.2)
ERYTHROCYTE [DISTWIDTH] IN BLOOD BY AUTOMATED COUNT: 18.5 % (ref 12.3–15.4)
GLUCOSE BLDC GLUCOMTR-MCNC: 141 MG/DL (ref 70–130)
GLUCOSE BLDC GLUCOMTR-MCNC: 221 MG/DL (ref 70–130)
GLUCOSE SERPL-MCNC: 164 MG/DL (ref 65–99)
HCT VFR BLD AUTO: 28.8 % (ref 34–46.6)
HGB BLD-MCNC: 9.6 G/DL (ref 12–15.9)
IMM GRANULOCYTES # BLD AUTO: 0.18 10*3/MM3 (ref 0–0.05)
IMM GRANULOCYTES NFR BLD AUTO: 2.5 % (ref 0–0.5)
LYMPHOCYTES # BLD AUTO: 1.39 10*3/MM3 (ref 0.7–3.1)
LYMPHOCYTES NFR BLD AUTO: 19.2 % (ref 19.6–45.3)
MCH RBC QN AUTO: 28.5 PG (ref 26.6–33)
MCHC RBC AUTO-ENTMCNC: 33.3 G/DL (ref 31.5–35.7)
MCV RBC AUTO: 85.5 FL (ref 79–97)
MONOCYTES # BLD AUTO: 0.54 10*3/MM3 (ref 0.1–0.9)
MONOCYTES NFR BLD AUTO: 7.5 % (ref 5–12)
NEUTROPHILS NFR BLD AUTO: 5.09 10*3/MM3 (ref 1.7–7)
NEUTROPHILS NFR BLD AUTO: 70.4 % (ref 42.7–76)
NRBC BLD AUTO-RTO: 0.3 /100 WBC (ref 0–0.2)
PLATELET # BLD AUTO: 87 10*3/MM3 (ref 140–450)
PMV BLD AUTO: 10.1 FL (ref 6–12)
POTASSIUM SERPL-SCNC: 4.5 MMOL/L (ref 3.5–5.2)
RBC # BLD AUTO: 3.37 10*6/MM3 (ref 3.77–5.28)
SODIUM SERPL-SCNC: 131 MMOL/L (ref 136–145)
WBC NRBC COR # BLD: 7.23 10*3/MM3 (ref 3.4–10.8)

## 2023-08-10 PROCEDURE — 94664 DEMO&/EVAL PT USE INHALER: CPT

## 2023-08-10 PROCEDURE — 94799 UNLISTED PULMONARY SVC/PX: CPT

## 2023-08-10 PROCEDURE — 94761 N-INVAS EAR/PLS OXIMETRY MLT: CPT

## 2023-08-10 PROCEDURE — 94760 N-INVAS EAR/PLS OXIMETRY 1: CPT

## 2023-08-10 PROCEDURE — 82948 REAGENT STRIP/BLOOD GLUCOSE: CPT

## 2023-08-10 PROCEDURE — 63710000001 PREDNISONE PER 1 MG: Performed by: INTERNAL MEDICINE

## 2023-08-10 PROCEDURE — 99232 SBSQ HOSP IP/OBS MODERATE 35: CPT | Performed by: INTERNAL MEDICINE

## 2023-08-10 PROCEDURE — 97530 THERAPEUTIC ACTIVITIES: CPT

## 2023-08-10 PROCEDURE — 85025 COMPLETE CBC W/AUTO DIFF WBC: CPT | Performed by: INTERNAL MEDICINE

## 2023-08-10 PROCEDURE — 80048 BASIC METABOLIC PNL TOTAL CA: CPT | Performed by: HOSPITALIST

## 2023-08-10 PROCEDURE — 63710000001 INSULIN LISPRO (HUMAN) PER 5 UNITS: Performed by: HOSPITALIST

## 2023-08-10 RX ORDER — VALACYCLOVIR HYDROCHLORIDE 1 G/1
1000 TABLET, FILM COATED ORAL
Qty: 5 TABLET | Refills: 0 | Status: SHIPPED | OUTPATIENT
Start: 2023-08-11 | End: 2023-08-16

## 2023-08-10 RX ORDER — HYDRALAZINE HYDROCHLORIDE 100 MG/1
100 TABLET, FILM COATED ORAL 3 TIMES DAILY
Qty: 90 TABLET | Refills: 0 | Status: SHIPPED | OUTPATIENT
Start: 2023-08-10 | End: 2023-09-09

## 2023-08-10 RX ORDER — CARVEDILOL 3.12 MG/1
3.12 TABLET ORAL 2 TIMES DAILY WITH MEALS
Qty: 60 TABLET | Refills: 0 | Status: SHIPPED | OUTPATIENT
Start: 2023-08-10 | End: 2023-09-09

## 2023-08-10 RX ORDER — AMOXICILLIN 250 MG
2 CAPSULE ORAL 2 TIMES DAILY
Qty: 120 TABLET | Refills: 0 | Status: SHIPPED | OUTPATIENT
Start: 2023-08-10 | End: 2023-09-09

## 2023-08-10 RX ADMIN — CARVEDILOL 3.12 MG: 3.12 TABLET, FILM COATED ORAL at 08:27

## 2023-08-10 RX ADMIN — TAMSULOSIN HYDROCHLORIDE 0.4 MG: 0.4 CAPSULE ORAL at 08:27

## 2023-08-10 RX ADMIN — INSULIN LISPRO 12 UNITS: 100 INJECTION, SOLUTION INTRAVENOUS; SUBCUTANEOUS at 12:24

## 2023-08-10 RX ADMIN — HYDRALAZINE HYDROCHLORIDE 100 MG: 50 TABLET, FILM COATED ORAL at 08:27

## 2023-08-10 RX ADMIN — Medication 1000 UNITS: at 08:27

## 2023-08-10 RX ADMIN — Medication 1000 MCG: at 08:27

## 2023-08-10 RX ADMIN — HYDROCODONE BITARTRATE AND ACETAMINOPHEN 1 TABLET: 7.5; 325 TABLET ORAL at 10:40

## 2023-08-10 RX ADMIN — INSULIN LISPRO 12 UNITS: 100 INJECTION, SOLUTION INTRAVENOUS; SUBCUTANEOUS at 08:28

## 2023-08-10 RX ADMIN — GABAPENTIN 600 MG: 300 CAPSULE ORAL at 08:27

## 2023-08-10 RX ADMIN — HYDROCODONE BITARTRATE AND ACETAMINOPHEN 1 TABLET: 7.5; 325 TABLET ORAL at 02:13

## 2023-08-10 RX ADMIN — BUDESONIDE AND FORMOTEROL FUMARATE DIHYDRATE 2 PUFF: 160; 4.5 AEROSOL RESPIRATORY (INHALATION) at 07:33

## 2023-08-10 RX ADMIN — VALACYCLOVIR HYDROCHLORIDE 1000 MG: 500 TABLET, FILM COATED ORAL at 08:27

## 2023-08-10 RX ADMIN — GUAIFENESIN 600 MG: 600 TABLET, EXTENDED RELEASE ORAL at 08:27

## 2023-08-10 RX ADMIN — HYDRALAZINE HYDROCHLORIDE 100 MG: 50 TABLET, FILM COATED ORAL at 15:07

## 2023-08-10 RX ADMIN — PREDNISONE 20 MG: 20 TABLET ORAL at 08:27

## 2023-08-10 RX ADMIN — LEVOTHYROXINE SODIUM 25 MCG: 0.03 TABLET ORAL at 08:27

## 2023-08-10 RX ADMIN — INSULIN LISPRO 3 UNITS: 100 INJECTION, SOLUTION INTRAVENOUS; SUBCUTANEOUS at 12:24

## 2023-08-10 RX ADMIN — HYDROCODONE BITARTRATE AND ACETAMINOPHEN 1 TABLET: 7.5; 325 TABLET ORAL at 06:03

## 2023-08-10 RX ADMIN — PANTOPRAZOLE SODIUM 40 MG: 40 TABLET, DELAYED RELEASE ORAL at 06:03

## 2023-08-10 RX ADMIN — SENNOSIDES AND DOCUSATE SODIUM 2 TABLET: 50; 8.6 TABLET ORAL at 08:27

## 2023-08-10 NOTE — THERAPY TREATMENT NOTE
Patient Name: Helena Carmen  : 1931    MRN: [id number]                              Today's Date: 8/10/2023       Admit Date: 2023    Visit Dx:     ICD-10-CM ICD-9-CM   1. Uncontrolled hypertension  I10 401.9   2. Thrombocytopenia  D69.6 287.5   3. Hyponatremia  E87.1 276.1   4. History of ITP  Z86.2 V12.3   5. Rash (left flank)  R21 782.1     Patient Active Problem List   Diagnosis    Aortic valve stenosis    Fatigue    MI (mitral incompetence)    PVC (premature ventricular contraction)    Legally blind    Essential hypertension    Hypertriglyceridemia    Pulmonary hypertension    Paroxysmal atrial fibrillation    Anxiety    Stage 4 chronic kidney disease    Chronic pain disorder    Degeneration of lumbar intervertebral disc    Type 2 diabetes mellitus with hyperglycemia    Esophageal reflux    Gastroparesis    Hiatal hernia    Iatrogenic hypothyroidism    Macular degeneration    Malignant neoplasm of uterus    Osteoarthritis of knee    Peripheral nerve disease    Secondary hyperparathyroidism    Thrombocytopenia    Chronic heart failure with preserved ejection fraction    Other cirrhosis of liver    Hypothyroidism    Nonrheumatic tricuspid valve regurgitation    Anemia, chronic disease    Hyponatremia    Closed fracture of fifth lumbar vertebra    Closed fracture of fifth lumbar vertebra, unspecified fracture morphology, initial encounter    Diabetic polyneuropathy associated with type 2 diabetes mellitus    Chronic ITP (idiopathic thrombocytopenia)    Chronic midline low back pain with bilateral sciatica    Acute deep vein thrombosis of calf, bilateral    Compression fracture of L5 vertebra with routine healing    Acute left-sided low back pain with left-sided sciatica    Urine retention    Rib fracture    Acute on chronic combined systolic and diastolic congestive heart failure    Acute UTI    Blindness right eye category 3, blindness left eye category 3    Cognitive communication deficit     Pseudomonas (aeruginosa) (mallei) (pseudomallei) as the cause of diseases classified elsewhere    Multifocal pneumonia     Past Medical History:   Diagnosis Date    Aortic valve stenosis     s/p tissue AVR    Back pain     CKD (chronic kidney disease)     Colitis due to Clostridioides difficile 01/26/2022    Diastolic dysfunction     Grade 2 per echocardiogram 2013    Diverticulosis     Exertional shortness of breath     Heart disease     Hiatal hernia     Hyperlipidemia     Hypertension     Hyperthyroidism     Hypertriglyceridemia 05/31/2018    Hypothyroidism     L5 compression fracture (HCC) 08/2022    Left ventricular hypertrophy     Legally blind     Liver disease     Low back pain     Macular degeneration     Mitral regurgitation     Osteoarthritis of hip     Osteoporosis     Pancreatitis 01/26/2022    Paroxysmal atrial fibrillation     Peripheral neuropathy     Premature ventricular contractions     Pulmonary hypertension     Renal insufficiency syndrome     Type 2 diabetes mellitus     Uterine cancer      Past Surgical History:   Procedure Laterality Date    AORTIC VALVE REPAIR/REPLACEMENT      APPENDECTOMY      CATARACT EXTRACTION      1970, 1999    CHOLECYSTECTOMY      COLONOSCOPY      ENDOSCOPY  08/15/2014    no gross lesions in stomach/duodenum, erythrematous mucosa in stomach    EPIDURAL BLOCK      HYSTERECTOMY  2007    STERNOTOMY      UPPER GASTROINTESTINAL ENDOSCOPY        General Information       Row Name 08/10/23 1535          Physical Therapy Time and Intention    Document Type therapy note (daily note) (P)   -SK     Mode of Treatment individual therapy;physical therapy (P)   -SK       Row Name 08/10/23 4084          General Information    Patient Profile Reviewed yes (P)   -SK     Existing Precautions/Restrictions fall (P)   -SK     Barriers to Rehab visual deficit (P)   -SK       Row Name 08/10/23 6723          Living Environment    People in Home child(edison), adult;other relative(s) (P)   -SK        Row Name 08/10/23 1530          Cognition    Orientation Status (Cognition) oriented to;place;person (P)   -SK       Row Name 08/10/23 1530          Safety Issues, Functional Mobility    Impairments Affecting Function (Mobility) balance;endurance/activity tolerance;pain;postural/trunk control;strength (P)   -SK     Comment, Safety Issues/Impairments (Mobility) nonskid socks and gait belt donned. (P)   -SK               User Key  (r) = Recorded By, (t) = Taken By, (c) = Cosigned By      Initials Name Provider Type    SK Jocy Modi, PT Student PT Student                   Mobility       Row Name 08/10/23 1531          Sit-Stand Transfer    Sit-Stand [place] (Transfers) verbal cues;contact guard (P)   -SK     Assistive Device (Sit-Stand Transfers) walker, front-wheeled (P)   -SK       Row Name 08/10/23 1531          Gait/Stairs (Locomotion)    [place] Level (Gait) verbal cues;contact guard;other (see comments);1 person to manage equipment (P)   CGAx1 with assistx1 for chair follow during ambulation  -SK     Assistive Device (Gait) walker, front-wheeled (P)   -SK     [facility] with rest break x2: pt demonstrated improved endurance during today's session. WC with assist x1 for ambulation. (P)   -SK     Deviations/Abnormal Patterns (Gait) base of support, narrow;gait speed decreased;stride length decreased (P)   -SK     Bilateral Gait Deviations forward flexed posture (P)   -SK               User Key  (r) = Recorded By, (t) = Taken By, (c) = Cosigned By      Initials Name Provider Type    SK Jocy Modi, PT Student PT Student                   Obj/Interventions       Row Name 08/10/23 1533          Motor Skills    Therapeutic Exercise other (see comments) (P)   Bilat LE with 2-3 isometric hold- LAQ, knee marches, and glute squeezes.  -SK       Row Name 08/10/23 1533          Balance    Balance Assessment sit to stand dynamic balance (P)   -SK      Static Sitting Balance standby assist (P)   -SK     Dynamic Sitting Balance standby assist (P)   -SK     Position, Sitting Balance sitting in chair (P)   -SK     Sit to Stand Dynamic Balance verbal cues;contact guard;1-person assist (P)   -SK     Static Standing Balance verbal cues;contact guard (P)   -SK     Dynamic Standing Balance contact guard;verbal cues (P)   -SK     Position/Device Used, Standing Balance supported;walker, front-wheeled (P)   -SK     Comment, Balance No overt LOB- pt demonstrates mild unsteadiness when fatigued. (P)   -SK               User Key  (r) = Recorded By, (t) = Taken By, (c) = Cosigned By      Initials Name Provider Type    SK Jocy Modi, PT Student PT Student                   Goals/Plan    No documentation.                  Clinical Impression       Row Name 08/10/23 6615          Pain    Pre/Posttreatment Pain Comment Reports pain in lower back- does not rate pain today. (P)   -SK     Pain Intervention(s) Repositioned;Ambulation/increased activity (P)   -SK       Row Name 08/10/23 3799          Plan of Care Review    Plan of Care Reviewed With patient (P)   -SK     Progress improving (P)   -SK     Outcome Evaluation Pt was seen for PT treatment session today. Pt UIC upon arrival- agreeable to treatment. CGAx1/SBA for STS transfer- requires VC for hand placement. CGAx1 with assistx1 for chair follow while ambulating. Pt demonstrated improved endurance and gait speed during today's session- was able to ambulate a further distance before needing a rest break. No overt LOB, but mild unsteadiness when fatigued is noted. Pt will continue to benefit from skilled PT interventions to address overall functional mobility, endurance, strength, and safety. Rec SNF or home with assist/HH at d/c. (P)   -SK       Row Name 08/10/23 6804          Therapy Assessment/Plan (PT)    Rehab Potential (PT) good, to achieve stated therapy goals (P)   -SK     Criteria for Skilled Interventions Met (PT)  yes (P)   -SK     Therapy Frequency (PT) 5 times/wk (P)   -SK       Row Name 08/10/23 1535          Positioning and Restraints    Pre-Treatment Position sitting in chair/recliner (P)   -SK     Post Treatment Position chair (P)   -SK     In Chair notified nsg;reclined;encouraged to call for assist;exit alarm on;with family/caregiver (P)   -SK               User Key  (r) = Recorded By, (t) = Taken By, (c) = Cosigned By      Initials Name Provider Type    Jocy Olivera, PT Student PT Student                   Outcome Measures       Row Name 08/10/23 1536 08/10/23 0825       How much help from another person do you currently need...    Turning from your back to your side while in flat bed without using bedrails? 3 (P)   -SK 3  -AR    Moving from lying on back to sitting on the side of a flat bed without bedrails? 2 (P)   -SK 2  -AR    Moving to and from a bed to a chair (including a wheelchair)? 3 (P)   -SK 3  -AR    Standing up from a chair using your arms (e.g., wheelchair, bedside chair)? 3 (P)   -SK 3  -AR    Climbing 3-5 steps with a railing? 1 (P)   -SK 1  -AR    To walk in hospital room? 3 (P)   -SK 3  -AR    AM-PAC 6 Clicks Score (PT) 15 (P)   -SK 15  -AR    Highest level of mobility 4 --> Transferred to chair/commode (P)   -SK 4 --> Transferred to chair/commode  -AR      Row Name 08/10/23 1536          Functional Assessment    Outcome Measure Options AM-PAC 6 Clicks Basic Mobility (PT) (P)   -SK               User Key  (r) = Recorded By, (t) = Taken By, (c) = Cosigned By      Initials Name Provider Type    Juliet Celestin, RN Registered Nurse    Jocy Olivera, PT Student PT Student                                 Physical Therapy Education       Title: PT OT SLP Therapies (Done)       Topic: Physical Therapy (Done)       Point: Mobility training (Done)       Learning Progress Summary             Patient Acceptance, E,TB, VU,NR by SK at 8/10/2023 1536    Acceptance, E,TB, VU,NR by SK at 8/9/2023 1606     Acceptance, E,TB, VU,NR by SK at 8/8/2023 1035    Acceptance, E, VU by  at 8/7/2023 1526    Acceptance, E,TB, VU,NR by SK at 8/4/2023 1452    Acceptance, E,TB, VU,NR by SK at 8/2/2023 1534                         Point: Home exercise program (Resolved)       Learning Progress Summary             Patient Acceptance, E,TB, VU,NR by SK at 8/8/2023 1035                         Point: Body mechanics (Done)       Learning Progress Summary             Patient Acceptance, E,TB, VU,NR by SK at 8/10/2023 1536    Acceptance, E,TB, VU,NR by SK at 8/9/2023 1606    Acceptance, E,TB, VU,NR by SK at 8/8/2023 1035    Acceptance, E,TB, VU,NR by SK at 8/4/2023 1452    Acceptance, E,TB, VU,NR by SK at 8/2/2023 1534                         Point: Precautions (Done)       Learning Progress Summary             Patient Acceptance, E,TB, VU,NR by SK at 8/10/2023 1536    Acceptance, E,TB, VU,NR by SK at 8/9/2023 1606    Acceptance, E,TB, VU,NR by SK at 8/8/2023 1035    Acceptance, E,TB, VU,NR by SK at 8/4/2023 1452    Acceptance, E,TB, VU,NR by SK at 8/2/2023 1534                                         User Key       Initials Effective Dates Name Provider Type Discipline     12/13/22 -  Trudy Mason, PT Physical Therapist PT    SK 07/28/23 -  Jocy Modi, CANDY Student PT Student PT                  PT Recommendation and Plan  Planned Therapy Interventions (PT): bed mobility training, gait training, balance training, patient/family education, postural re-education, strengthening, transfer training  Plan of Care Reviewed With: (P) patient  Progress: (P) improving  Outcome Evaluation: (P) Pt was seen for PT treatment session today. Pt UIC upon arrival- agreeable to treatment. CGAx1/SBA for STS transfer- requires VC for hand placement. CGAx1 with assistx1 for chair follow while ambulating. Pt demonstrated improved endurance and gait speed during today's session- was able to ambulate a further distance before needing a rest break.  No overt LOB, but mild unsteadiness when fatigued is noted. Pt will continue to benefit from skilled PT interventions to address overall functional mobility, endurance, strength, and safety. Rec SNF or home with assist/HH at d/c.     Time Calculation:         PT Charges       Row Name 08/10/23 1537             Time Calculation    Start Time 1443 (P)   -SK      Stop Time 1501 (P)   -SK      Time Calculation (min) 18 min (P)   -SK      PT Received On 08/10/23 (P)   -SK      PT - Next Appointment 08/11/23 (P)   -SK         Time Calculation- PT    Total Timed Code Minutes- PT 18 minute(s) (P)   -SK         Timed Charges    31355 - PT Therapeutic Exercise Minutes 7 (P)   -SK      51075 - PT Therapeutic Activity Minutes 11 (P)   -SK         Total Minutes    Timed Charges Total Minutes 18 (P)   -SK       Total Minutes 18 (P)   -SK                User Key  (r) = Recorded By, (t) = Taken By, (c) = Cosigned By      Initials Name Provider Type    SK Jocy Modi, PT Student PT Student                  Therapy Charges for Today       Code Description Service Date Service Provider Modifiers Qty    38003567678 HC PT THERAPEUTIC ACT EA 15 MIN 8/9/2023 Jocy Modi, PT Student GP 2    90017068007 HC PT THERAPEUTIC ACT EA 15 MIN 8/10/2023 Jocy Modi, PT Student GP 1            PT G-Codes  Outcome Measure Options: (P) AM-PAC 6 Clicks Basic Mobility (PT)  AM-PAC 6 Clicks Score (PT): (P) 15  AM-PAC 6 Clicks Score (OT): 14  PT Discharge Summary  Anticipated Discharge Disposition (PT): (P) skilled nursing facility, home with home health, home with 24/7 care, home with assist  Discharge Destination: Home with assist, Home with home health, SNF    Jocy Modi PT Student  8/10/2023

## 2023-08-10 NOTE — PROGRESS NOTES
"Nutrition Services    Patient Name:  Helena Carmen  YOB: 1931  MRN: [id number]  Admit Date:  7/31/2023  Assessment Date:  08/10/23    FOLLOW UP - CLINICAL NUTRITION    Encounter Information         Reason for Encounter RD f/u.       Current Issues Pt on soft to chew, ground meat, healthy heart diet with PO intake % x 4 meals yesterday and today. Tolerating well per notes. +BM yesterday per I/Os. RD will continue to follow course.      Current Nutrition Orders & Evaluation of Intake       Oral Nutrition     Current PO Diet Diet: Regular/House Diet, Cardiac Diets; Healthy Heart (2-3 Na+); Texture: Soft to Chew (NDD 3); Soft to Chew: Ground Meat; Fluid Consistency: Thin (IDDSI 0)   Supplement Boost Glucose Control, Daily   PO Evaluation     % PO Intake % x 4 meals    # of Days Evaluated 1    Factors Affecting Intake  swallow impairment   --  Anthropometrics          Height    Weight Height: 144.8 cm (57.01\")  Weight: 80.1 kg (176 lb 9.4 oz) (08/06/23 1100)    BMI kg/m2 Body mass index is 38.2 kg/mý.  Obese, Class II (35 - 39.9)    Weight trend No new weight available     Labs        Pertinent Labs Reviewed, listed below     Results from last 7 days   Lab Units 08/10/23  0557 08/09/23  0624 08/08/23  0641 08/07/23  0738 08/06/23  0550 08/05/23  0701 08/04/23  0743   SODIUM mmol/L 131* 132* 136   < > 130* 133* 128*   POTASSIUM mmol/L 4.5 4.5 4.7   < > 4.6 4.6 4.2   CHLORIDE mmol/L 100 100 104   < > 101 101 99   CO2 mmol/L 21.0* 23.0 23.2   < > 20.8* 22.9 20.0*   BUN mg/dL 43* 40* 44*   < > 43* 41* 37*   CREATININE mg/dL 1.38* 1.24* 1.36*   < > 1.41* 1.32* 1.44*   CALCIUM mg/dL 8.0* 7.6* 8.4   < > 8.2 8.4 8.1*   BILIRUBIN mg/dL  --   --   --   --  0.5 0.4 0.3   ALK PHOS U/L  --   --   --   --  31* 28* 35*   ALT (SGPT) U/L  --   --   --   --  21 13 11   AST (SGOT) U/L  --   --   --   --  17 16 14   GLUCOSE mg/dL 164* 119* 155*   < > 267* 142* 275*    < > = values in this interval not displayed. "     Results from last 7 days   Lab Units 08/10/23  0557 08/07/23  0738 08/06/23  0550   HEMOGLOBIN g/dL 9.6*   < > 9.6*   HEMATOCRIT % 28.8*   < > 29.8*   WBC 10*3/mm3 7.23   < > 6.39   ALBUMIN g/dL  --   --  2.6*    < > = values in this interval not displayed.     Results from last 7 days   Lab Units 08/10/23  0557 08/09/23  0624 08/08/23  0641 08/07/23  0738 08/06/23  0550   PLATELETS 10*3/mm3 87* 97* 88* 86* 65*     COVID19   Date Value Ref Range Status   06/15/2023 Not Detected Not Detected - Ref. Range Final     Lab Results   Component Value Date    HGBA1C 6.60 (H) 08/02/2023          Medications            Scheduled Medications budesonide-formoterol, 2 puff, Inhalation, BID  carvedilol, 3.125 mg, Oral, BID With Meals  cholecalciferol, 1,000 Units, Oral, Daily  gabapentin, 600 mg, Oral, Q12H  guaiFENesin, 600 mg, Oral, Q12H  hydrALAZINE, 100 mg, Oral, TID  insulin glargine, 36 Units, Subcutaneous, Nightly  insulin lispro, 12 Units, Subcutaneous, TID With Meals  insulin lispro, 2-7 Units, Subcutaneous, 4x Daily AC & at Bedtime  levothyroxine, 25 mcg, Oral, QAM  lidocaine, 2 patch, Transdermal, Q24H  LORazepam, 1 mg, Oral, Q PM  montelukast, 10 mg, Oral, Nightly  pantoprazole, 40 mg, Oral, Q AM  polyethylene glycol, 17 g, Oral, Daily  predniSONE, 20 mg, Oral, Daily With Breakfast  senna-docusate sodium, 2 tablet, Oral, BID  tamsulosin, 0.4 mg, Oral, Daily  valACYclovir, 1,000 mg, Oral, Q24H  vitamin B-12, 1,000 mcg, Oral, Daily        Infusions      PRN Medications      Physical Findings          Physical Appearance alert, obese, oriented, sedate, visual impairment   Oral/Mouth Cavity tooth or teeth missing   Edema  generalized, 1+ (trace)   Gastrointestinal non-distended , normoactive, last bowel movement: 8/9   Skin  blisters, bruising, dermatitis, MASD, petechiae, pressure injury: DTI (R coccyx)   Tubes/Drains/Lines none   NFPE Not indicated at this time   --  NUTRITION INTERVENTION / PLAN OF  CARE  Intervention Goal         Intervention Goal(s) Nutrition support treatment, Improved nutrition related labs, Reduce/improve symptoms, Meet estimated needs, Disease management/therapy, Tolerate PO , Maintain intake, and Maintain weight     Nutrition Intervention         RD Action Supplement provided, Encourage intake, Follow Tx Progress, Care plan reviewed, and Recommend/ordered:      Nutrition Prescription         Diet Prescription     Supplement Prescription Boost Glucose Control, Daily   EN/PN Prescription    New Prescription Ordered? Continue same per protocol   --  Monitor/Evaluation        Monitor Per protocol, I&O, PO intake, Supplement intake, Pertinent labs, Weight, Skin status, GI status, Symptoms   Discharge Needs Pending clinical course   Education Will instruct as appropriate   --    RD to follow up per protocol.    Electronically signed by:  Olga Jackson RD  08/10/23 14:28 EDT

## 2023-08-10 NOTE — DISCHARGE SUMMARY
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  Dysphagia  Chronic anemia  Congestive heart failure  Diabetes mellitus  Hypertension  Hypothyroidism  Paroxysmal atrial fibrillation  Pulmonary hypertension  Aortic stenosis  Degenerative disc disease  Compression fracture L5 vertebra  Shingles  Chronic kidney disease stage IV  Chronic kidney retention with Hoffman catheter    Discharge medications    Current Facility-Administered Medications:     budesonide-formoterol (SYMBICORT) 160-4.5 MCG/ACT inhaler 2 puff, 2 puff, Inhalation, BID, Fred Jeffrey MD, 2 puff at 08/10/23 0733    carvedilol (COREG) tablet 3.125 mg, 3.125 mg, Oral, BID With Meals, Mango Arnold MD, 3.125 mg at 08/10/23 0827    cholecalciferol (VITAMIN D3) tablet 1,000 Units, 1,000 Units, Oral, Daily, Fred Jeffrey MD, 1,000 Units at 08/10/23 0827    gabapentin (NEURONTIN) capsule 600 mg, 600 mg, Oral, Q12H, Fred Jeffrey MD, 600 mg at 08/10/23 0827    guaiFENesin (MUCINEX) 12 hr tablet 600 mg, 600 mg, Oral, Q12H, Mango Arnold MD, 600 mg at 08/10/23 0827    hydrALAZINE (APRESOLINE) tablet 100 mg, 100 mg, Oral, TID, Mango Arnold MD, 100 mg at 08/10/23 0827    insulin glargine (LANTUS, SEMGLEE) injection 36 Units, 36 Units, Subcutaneous, Nightly, Mango Arnold MD, 36 Units at 08/09/23 2153    insulin lispro (HUMALOG/ADMELOG) injection 12 Units, 12 Units, Subcutaneous, TID With Meals, Mango Arnold MD, 12 Units at 08/10/23 1224    insulin lispro (HUMALOG/ADMELOG) injection 2-7 Units, 2-7 Units, Subcutaneous, 4x Daily AC & at Bedtime, Mango Arnold MD, 3 Units at 08/10/23 1224    levothyroxine (SYNTHROID, LEVOTHROID) tablet 25 mcg, 25 mcg, Oral, QAM, [place], Fred R, MD, 25 mcg at 08/10/23 0827    lidocaine (LIDODERM) 5 % 2 patch, 2 patch, Transdermal, Q24H, Tomer Jeong MD, 2 patch at 08/09/23 1042    LORazepam  (ATIVAN) tablet 1 mg, 1 mg, Oral, Q PM, Fred Jeffrey MD, 1 mg at 08/09/23 2153    montelukast (SINGULAIR) tablet 10 mg, 10 mg, Oral, Nightly, Fred Jeffrey MD, 10 mg at 08/09/23 2154    pantoprazole (PROTONIX) EC tablet 40 mg, 40 mg, Oral, Q AM, Fred Jeffrey MD, 40 mg at 08/10/23 0603    polyethylene glycol (MIRALAX) packet 17 g, 17 g, Oral, Daily, Pablo Sherman Jr., MD, 17 g at 08/09/23 0859    predniSONE (DELTASONE) tablet 20 mg, 20 mg, Oral, Daily With Breakfast, Pablo Sherman Jr., MD, 20 mg at 08/10/23 0827    sennosides-docusate (PERICOLACE) 8.6-50 MG per tablet 2 tablet, 2 tablet, Oral, BID, Mango Arnold MD, 2 tablet at 08/10/23 0827    tamsulosin (FLOMAX) 24 hr capsule 0.4 mg, 0.4 mg, Oral, Daily, Fred Jeffrey MD, 0.4 mg at 08/10/23 0827    valACYclovir (VALTREX) tablet 1,000 mg, 1,000 mg, Oral, Q24H, Tomer Jeong MD, 1,000 mg at 08/10/23 0827    vitamin B-12 (CYANOCOBALAMIN) tablet 1,000 mcg, 1,000 mcg, Oral, Daily, Pablo Sherman Jr., MD, 1,000 mcg at 08/10/23 0827     Consults obtained  Hematology oncology  Gastroenterology    Procedures  None    Hospital course  89-year-old white female with very complex past medical history including chronic ITP admitted to emergency room with generalized weakness.  Patient workup revealed worsening thrombocytopenia admitted for management.  Patient admitted treated with IV steroids and was supposed to be starting Rituxan but she found to have shingles followed by positive hepatitis B core antibody and surface antibody and was canceled and received IVIG.  Patient's platelets improved to 87 and his steroids changed to by mouth home dose and clear for discharge.  Patient decided to go to rehab yesterday but changed her mind today and wants to go home with family support and home health which is arranged.  Patient remained DNR throughout hospital course.    Discharge diet regular    Activity as tolerated    Medication as above    Follow-up with  prime doctor in 1 week and follow-up with hematology gastroenterology per the instructions and take medication as directed.    KADEEM RIVAS MD

## 2023-08-10 NOTE — PROGRESS NOTES
[facility] GROUP INPATIENT PROGRESS NOTE    Length of Stay:  9 days    CHIEF COMPLAINT:  Thrombocytopenia, B12 deficiency, CKD4, shingles, prior hepatitis B infection    SUBJECTIVE:   Patient doing well today, fatigue improved.  She is preparing to be discharged home.  Patient's daughter did notice a new area of blistering towards the anterior aspect of her shingles.  She has not had a bowel movement today.    ROS:  Review of Systems   Comprehensive review of systems obtained with pertinent positive findings as noted interval history above.  All other systems negative.      OBJECTIVE:  Vitals:    08/09/23 2213 08/10/23 0545 08/10/23 0733 08/10/23 0816   BP:  155/58  179/64   BP Location:  Left arm  Left arm   Patient Position:  Lying  Lying   Pulse: 63 53 62 60   Resp: 16 16 18 18   Temp:  97.1 øF (36.2 øC)  97.6 øF (36.4 øC)   TempSrc:  Oral  Oral   SpO2: 97% 98% 97% 96%   Weight:       Height:             PHYSICAL EXAMINATION:  General: Elderly female sitting in reclining chair  Chest/Lungs: Clear to auscultation bilaterally  Heart: Irregularly irregular  Abdomen/GI:  mildly distended, slightly tender  Extremities: Trace-1+ bilateral lower extremity edema  Skin: Shingles rash has improved, lesions crusted.  There was a 1 cm elongated blister with no associated erythema in the anterior portion of the areas of shingle involvement.        DIAGNOSTIC DATA:  Results Review:     I reviewed the patient's new clinical results.    Results from last 7 days   Lab Units 08/10/23  0557 08/09/23  0624 08/08/23  0641   WBC 10*3/mm3 7.23 8.10 8.07   HEMOGLOBIN g/dL 9.6* 10.3* 10.0*   HEMATOCRIT % 28.8* 30.3* 30.6*   PLATELETS 10*3/mm3 87* 97* 88*        Results from last 7 days   Lab Units 08/10/23  0557 08/09/23  0624 08/08/23  0641 08/07/23  0738 08/06/23  0550 08/05/23  0701 08/04/23  0743   SODIUM mmol/L 131* 132* 136   < > 130* 133* 128*   POTASSIUM mmol/L 4.5 4.5 4.7   < > 4.6 4.6 4.2   CHLORIDE mmol/L 100 100 104    < > 101 101 99   CO2 mmol/L 21.0* 23.0 23.2   < > 20.8* 22.9 20.0*   BUN mg/dL 43* 40* 44*   < > 43* 41* 37*   CREATININE mg/dL 1.38* 1.24* 1.36*   < > 1.41* 1.32* 1.44*   CALCIUM mg/dL 8.0* 7.6* 8.4   < > 8.2 8.4 8.1*   BILIRUBIN mg/dL  --   --   --   --  0.5 0.4 0.3   ALK PHOS U/L  --   --   --   --  31* 28* 35*   ALT (SGPT) U/L  --   --   --   --  21 13 11   AST (SGOT) U/L  --   --   --   --  17 16 14   GLUCOSE mg/dL 164* 119* 155*   < > 267* 142* 275*    < > = values in this interval not displayed.        Lab Results   Component Value Date    NEUTROABS 5.09 08/10/2023                     Assessment & Plan   ASSESSMENT/PLAN:  This is a 89 y.o. female with:     *Severe thrombocytopenia.  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  She has chronic liver disease which might be contributing.  She was previously treated with IVIG with good response.  Spleen was not enlarged on CT on 5/1/2023.  Previously elected to maintain patient on low-dose prednisone to maintain platelet count at least in the 50-45881 range, previously receiving prednisone 10 mg daily with relatively stable platelet count  She was found to have a new bruise over the abdomen with no preceding trauma.  7/31/2023: Platelet count 17,000.  Patient was given Solu-Medrol 125 mg IV.  8/1/2023: Platelet count 16,000.  IPF: 15.3%.  7/31/2023: PT and PTT were normal.  8/2/2023: Platelet count 28,000.  8/3/2023: Platelet count 39,000.  8/4/2023: Platelet count 48,000.  Rituxan was initially recommended.  However, she was found to have positive hepatitis B core antibody.  Therefore, Rituxan was not recommended.  Per GI, patient at risk for reactivation of hepatitis B and would require treatment with tenofovir if rituximab pursued.  Would not recommend use of Promacta due to risk of hepatotoxicity in the setting of ongoing question regarding patient having chronic liver disease.  IVIG 45 g (1 g/kg ideal body weight) daily x2 days was  started on 8/4/2023.  Solu-Medrol dose was reduced to 60 mg daily on 8/4/2023.  8/5/2023: Platelet count 57,000.  8/6/2023: Platelet count 65,000.  Solu-Medrol dose decreased to 40 mg daily on 8/6/2023 8/7/2023: Platelet count 86,000.    Transitioned from Solu-Medrol 40 mg daily to prednisone 20 mg daily on 8/8/2023 8/8/2023: Platelet count 88,000.    8/9/2023: Platelet count 97,000  8/10/2023: Platelet count 87,000  For now we will continue on prednisone 20 mg daily.  Options when effects of IVIG begin to decline in the next 2 to 4 weeks would be repeat dosing of IVIG in the outpatient setting versus consideration of Nplate.  Nplate however would be difficult regarding frequency of visits and patient's limited mobility.  Patient ready for discharge home today.  We will make arrangements for CBC to be drawn in 1 week by [facility] and faxed to our office for review.  I will see patient back in 2 weeks with CBC, CMP, IPF.  We will discuss prednisone taper at that time.     *Vitamin B12 deficiency.  Vitamin B12 was 236 on 3/12/2023.  She has not been taking vitamin B12.  8/1/2023: Vitamin B12 287.  Patient started on vitamin B12 injections daily on 8/2/2023 x 5 days.  Subsequently resumed oral B12 1000 mcg daily    *Anemia  Most recent anemia evaluation 3/12/2023 with iron 135, ferritin 295, iron saturation 51%, TIBC 264  Significant component of anemia secondary to chronic disease and CKD  See above regarding B12 deficiency  Folate level normal at 14.5 on 8/1/2023  Hemoglobin fairly stable in the 10-11 range  Additional labs on 8/7/2023 with iron 120, ferritin 340, iron saturation 42%, TIBC 285.  Consistent with anemia secondary to chronic disease and CKD  Hemoglobin today has declined slightly to 9.6     *Chronic kidney disease, stage 4.  Creatinine in the 1.3-2.0 range     *Shingles affecting the left side of the abdomen.  She was started on valacyclovir 1 g once daily-dose reduced due to decreased kidney  function.  There was some worsening of the rash on 8/3/2023.  Valacyclovir 1 g daily x14 days initiated 8/2/2023  Patient has experienced significant improvement in the area of shingles involvement, lesions appear crusted.  She has a small 1 cm elongated blister in the mid anterior aspect of the area of shingles involvement that has no associated erythema, unclear whether this represents a new vesicular lesion although its morphology appears atypical.  We will monitor for any further vesicular lesions.  Patient continuing on Valtrex.     *Vaginal yeast infection  Patient had a recent UTI and was treated with levofloxacin.  Patient developed vaginal yeast infection following the antibiotic therapy and was given 1 dose of Diflucan     Dose #2 was given on 8/3/2023.     *Positive Hepatitis B core Ab.  No known prior history of hepatitis.   Imaging studies previously noted to be consistent with cirrhotic appearance of liver  Patient was seen by GI, additional laboratory studies obtained on 8/4/23 including positive hepatitis B E antibody, negative hepatitis B E antigen negative HBV PCR, positive hepatitis A antibody, negative hepatitis A IgM, negative hepatitis C antibody  Per GI, patient at risk for reactivation of hepatitis B if treated with rituximab.  Recommended use of concurrent tenofovir if rituximab is pursued in the future.    *GI prophylaxis  Protonix 40 mg daily    *Possible aspiration  Patient undergoing evaluation per hospitalist    *Constipation  Currently receiving Amanda-Colace 2 tablets twice daily and MiraLAX daily  Patient subsequently developed diarrhea, bowel regimen held yesterday.  Today patient has not yet had a bowel movement.  Discussed that they will manage this expectantly at home with use of Senokot and MiraLAX as needed    *CODE STATUS  Patient is DNR, continue current level of support    *Disposition  Patient's family would like to bring her home for home care, has adequate support.  Home  health will remain involved.      PLAN:       Continue prednisone 20 mg daily   Continue oral B12 1000 mcg daily  Continue Valtrex 1000 mg daily x14 days (initiated 8/2/2023) for shingles  Options when effects of IVIG begin to decline in the next 2 to 4 weeks would be repeat dosing of IVIG in the outpatient setting versus consideration of Nplate.  Nplate however would be difficult regarding frequency of visits and patient's limited mobility.  Patient is being discharged home today  We will make arrangements for outpatient follow-up with CBC to be drawn by [facility] and forwarded to our office in 1 week for review.  MD visit in 2 weeks with CBC, CMP, IPF.  We will discuss possible steroid taper at that time pending platelet count.    Discussed with patient and family at bedside           Pablo Sherman MD

## 2023-08-10 NOTE — PROGRESS NOTES
"Daily progress note    Primary care physician      Subjective  Doing better this morning with no new complaints and changed her mind about going to rehab and wants to go home with family support and home health.    History of present illness  89-year-old white female with very complex past medical history and well-known to our service including ITP chronic low back pain with L5 vertebral body fracture degenerative disease paroxysmal atrial fibrillation DVT diabetes hypertension hyperlipidemia hypothyroidism congestive heart failure and chronic kidney disease stage III who lives at home with her daughter brought to the emergency room with generalized weakness and bruises all over.  Patient work-up in ER revealed worsening thrombocytopenia admitted for management.  Patient denies any fever chills chest pain shortness of breath abdominal pain nausea vomiting diarrhea.  Patient is DNR per her wishes.     REVIEW OF SYSTEMS  Unremarkable      PHYSICAL EXAM  Blood pressure (!) 143/38, pulse (!) 45, temperature 97.6 øF (36.4 øC), temperature source Oral, resp. rate 18, height 144.8 cm (57.01\"), weight 80.1 kg (176 lb 9.4 oz), SpO2 95 %.    Constitutional:       General: She is not in acute distress.     Appearance: She is well-developed.   HENT:      Head: Normocephalic and atraumatic.   Eyes:      Extraocular Movements: Extraocular movements intact.   Cardiovascular:      Rate and Rhythm: Normal rate and regular rhythm.      Heart sounds: Normal heart sounds.   Pulmonary:      Effort: Pulmonary effort is normal.      Breath sounds: Normal breath sounds.   Abdominal:      General: There is no distension.      Comments: Erythematous macular clusters noted along the left flank   Genitourinary:     Comments: Indwelling Hoffman catheter  Skin:     General: Skin is warm.   Neurological:      General: No focal deficit present.      Mental Status: She is alert and oriented to person, place, and time.   Psychiatric:         " Mood and Affect: Mood normal.      LAB RESULTS  Lab Results (last 24 hours)       Procedure Component Value Units Date/Time    POC Glucose Once [[id number]]  (Abnormal) Collected: 08/10/23 1103    Specimen: Blood Updated: 08/10/23 1105     Glucose 221 mg/dL     POC Glucose Once [[id number]]  (Abnormal) Collected: 08/10/23 0815    Specimen: Blood Updated: 08/10/23 0817     Glucose 141 mg/dL     Basic Metabolic Panel [[id number]]  (Abnormal) Collected: 08/10/23 0557    Specimen: Blood Updated: 08/10/23 0659     Glucose 164 mg/dL      BUN 43 mg/dL      Creatinine 1.38 mg/dL      Sodium 131 mmol/L      Potassium 4.5 mmol/L      Chloride 100 mmol/L      CO2 21.0 mmol/L      Calcium 8.0 mg/dL      BUN/Creatinine Ratio 31.2     Anion Gap 10.0 mmol/L      eGFR 36.0 mL/min/1.73     Narrative:      GFR Normal >60  Chronic Kidney Disease <60  Kidney Failure <15    The GFR formula is only valid for adults with stable renal function between ages 18 and 70.    CBC & Differential [[id number]]  (Abnormal) Collected: 08/10/23 0557    Specimen: Blood Updated: 08/10/23 0657    Narrative:      The following orders were created for panel order CBC & Differential.  Procedure                               Abnormality         Status                     ---------                               -----------         ------                     CBC Auto Differential[[id number]]        Abnormal            Final result                 Please view results for these tests on the individual orders.    CBC Auto Differential [[id number]]  (Abnormal) Collected: 08/10/23 0557    Specimen: Blood Updated: 08/10/23 0657     WBC 7.23 10*3/mm3      RBC 3.37 10*6/mm3      Hemoglobin 9.6 g/dL      Hematocrit 28.8 %      MCV 85.5 fL      MCH 28.5 pg      MCHC 33.3 g/dL      RDW 18.5 %      RDW-SD 54.3 fl      MPV 10.1 fL      Platelets 87 10*3/mm3      Neutrophil % 70.4 %      Lymphocyte % 19.2 %      Monocyte % 7.5 %      Eosinophil % 0.3 %      Basophil % 0.1 %       Immature Grans % 2.5 %      Neutrophils, Absolute 5.09 10*3/mm3      Lymphocytes, Absolute 1.39 10*3/mm3      Monocytes, Absolute 0.54 10*3/mm3      Eosinophils, Absolute 0.02 10*3/mm3      Basophils, Absolute 0.01 10*3/mm3      Immature Grans, Absolute 0.18 10*3/mm3      nRBC 0.3 /100 WBC     POC Glucose Once [[id number]]  (Abnormal) Collected: 08/09/23 2132    Specimen: Blood Updated: 08/09/23 2133     Glucose 313 mg/dL     POC Glucose Once [[id number]]  (Abnormal) Collected: 08/09/23 1716    Specimen: Blood Updated: 08/09/23 1718     Glucose 368 mg/dL           Imaging Results (Last 24 Hours)       ** No results found for the last 24 hours. **            Current Facility-Administered Medications:     bisacodyl (DULCOLAX) suppository 10 mg, 10 mg, Rectal, Daily PRN, Pablo Sherman Jr., MD    budesonide-formoterol (SYMBICORT) 160-4.5 MCG/ACT inhaler 2 puff, 2 puff, Inhalation, BID, Fred Jeffrey MD, 2 puff at 08/10/23 0733    carvedilol (COREG) tablet 3.125 mg, 3.125 mg, Oral, BID With Meals, Mango Arnold MD, 3.125 mg at 08/10/23 0827    cholecalciferol (VITAMIN D3) tablet 1,000 Units, 1,000 Units, Oral, Daily, Fred Jeffrey MD, 1,000 Units at 08/10/23 0827    gabapentin (NEURONTIN) capsule 600 mg, 600 mg, Oral, Q12H, Fred Jeffrey MD, 600 mg at 08/10/23 0827    guaiFENesin (MUCINEX) 12 hr tablet 600 mg, 600 mg, Oral, Q12H, Mango Arnold MD, 600 mg at 08/10/23 0827    hydrALAZINE (APRESOLINE) tablet 100 mg, 100 mg, Oral, TID, Mango Arnold MD, 100 mg at 08/10/23 0827    HYDROcodone-acetaminophen (NORCO) 7.5-325 MG per tablet 1 tablet, 1 tablet, Oral, Q4H PRN, Mango Arnold MD, 1 tablet at 08/10/23 1040    insulin glargine (LANTUS, SEMGLEE) injection 36 Units, 36 Units, Subcutaneous, Nightly, Mango Arnold MD, 36 Units at 08/09/23 2153    insulin lispro (HUMALOG/ADMELOG) injection 12 Units, 12 Units, Subcutaneous, TID With Meals, Mango Arnold MD, 12 Units at 08/10/23 1224    insulin lispro  (HUMALOG/ADMELOG) injection 2-7 Units, 2-7 Units, Subcutaneous, 4x Daily AC & at Bedtime, Mango Arnold MD, 3 Units at 08/10/23 1224    levothyroxine (SYNTHROID, LEVOTHROID) tablet 25 mcg, 25 mcg, Oral, QAM, Fred Jeffrey MD, 25 mcg at 08/10/23 0827    lidocaine (LIDODERM) 5 % 2 patch, 2 patch, Transdermal, Q24H, Tomer Jeong MD, 2 patch at 08/09/23 1042    LORazepam (ATIVAN) tablet 1 mg, 1 mg, Oral, Q PM, Fred Jeffrey MD, 1 mg at 08/09/23 2153    montelukast (SINGULAIR) tablet 10 mg, 10 mg, Oral, Nightly, Fred Jeffrey MD, 10 mg at 08/09/23 2154    ondansetron (ZOFRAN) injection 4 mg, 4 mg, Intravenous, Q6H PRN, Fred Jeffrey MD, 4 mg at 08/01/23 2044    pantoprazole (PROTONIX) EC tablet 40 mg, 40 mg, Oral, Q AM, Fred Jeffrey MD, 40 mg at 08/10/23 0603    polyethylene glycol (MIRALAX) packet 17 g, 17 g, Oral, Daily, Pablo Sherman Jr., MD, 17 g at 08/09/23 0859    predniSONE (DELTASONE) tablet 20 mg, 20 mg, Oral, Daily With Breakfast, Pablo Sherman Jr., MD, 20 mg at 08/10/23 0827    sennosides-docusate (PERICOLACE) 8.6-50 MG per tablet 2 tablet, 2 tablet, Oral, BID, Mango Arnold MD, 2 tablet at 08/10/23 0827    tamsulosin (FLOMAX) 24 hr capsule 0.4 mg, 0.4 mg, Oral, Daily, Fred Jeffrey MD, 0.4 mg at 08/10/23 0827    valACYclovir (VALTREX) tablet 1,000 mg, 1,000 mg, Oral, Q24H, Tomer Jeong MD, 1,000 mg at 08/10/23 0827    vitamin B-12 (CYANOCOBALAMIN) tablet 1,000 mcg, 1,000 mcg, Oral, Daily, Pablo Sherman Jr., MD, 1,000 mcg at 08/10/23 0827     ASSESSMENT  Chronic ITP   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  Dysphagia  Chronic anemia  Congestive heart failure  Diabetes mellitus  Hypertension  Hypothyroidism  Paroxysmal atrial fibrillation  Pulmonary hypertension  Aortic stenosis  Degenerative disc disease  Compression fracture L5 vertebra  Shingles  Chronic kidney disease stage IV  Chronic kidney retention with Hoffman catheter    PLAN  Discharge  home  Discharge summary dictated    KADEEM RIVAS MD    Copied text in this note has been reviewed and is accurate as of 08/10/23

## 2023-08-10 NOTE — NURSING NOTE
CWOCN- reassessed buttocks. Patient with small DTI noted right of the coccyx. Periwound is red, blanching. Patient reports that the silicone dressing is helping a lot and her skin feels better. Recommend turning, dressing, and bubble cushion for chair. Daughter attentive and aware of plan. She says that patient does sometimes have skin breakdown/pressure injury. They also use the sacral dressings. Will see weekly while patient is inpatient. No change in current plan.     08/10/23 1110   Pain/Comfort/Sleep   Response to Pain Interventions interventions effective per patient   Wound 08/09/23 Right coccyx Pressure Injury   Placement Date: 08/09/23   Present on Hospital Admission: No  Side: Right  Location: coccyx  Primary Wound Type: Pressure Injury   Wound Image    Pressure Injury Stage DTPI   Dressing Appearance intact   Base maroon/purple   Periwound blanchable;pink;redness   Periwound Temperature warm   Periwound Skin Turgor soft   Wound Length (cm) 0.7 cm   Wound Width (cm) 0.7 cm   Wound Surface Area (cm^2) 0.49 cm^2   Drainage Amount none   Dressing Care border dressing

## 2023-08-10 NOTE — PLAN OF CARE
Goal Outcome Evaluation:  Plan of Care Reviewed With: (P) patient        Progress: (P) improving  Outcome Evaluation: (P) Pt was seen for PT treatment session today. Pt UIC upon arrival- agreeable to treatment. CGAx1/SBA for STS transfer- requires VC for hand placement. CGAx1 with assistx1 for chair follow while ambulating. Pt demonstrated improved endurance and gait speed during today's session- was able to ambulate a further distance before needing a rest break. No overt LOB, but mild unsteadiness when fatigued is noted. Pt will continue to benefit from skilled PT interventions to address overall functional mobility, endurance, strength, and safety. Rec SNF or home with assist/HH at d/c.      Anticipated Discharge Disposition (PT): (P) skilled nursing facility, home with home health, home with 24/7 care, home with assist

## 2023-08-10 NOTE — PLAN OF CARE
Goal Outcome Evaluation:            Patient is alert and oriented x 4, vitals stable, room air, IV saline locked, chronic jimenes in use, ACHS, complaints of pain, see MAR for all medications administered, up with assist x 2, safety and isolation precautions in use, new blister on abdomen found this morning, nursing will continue to monitor for the remainder of the shift.

## 2023-08-11 ENCOUNTER — NURSE TRIAGE (OUTPATIENT)
Dept: CALL CENTER | Facility: HOSPITAL | Age: 88
End: 2023-08-11
Payer: MEDICARE

## 2023-08-11 ENCOUNTER — TELEPHONE (OUTPATIENT)
Dept: ONCOLOGY | Facility: CLINIC | Age: 88
End: 2023-08-11
Payer: MEDICARE

## 2023-08-11 NOTE — OUTREACH NOTE
Prep Survey      Flowsheet Row Responses   Scientologist facility patient discharged from? [place]   Is LACE score < 7 ? No   Eligibility Readm Mgmt   Discharge diagnosis Thrombocytopenia   Does the patient have one of the following disease processes/diagnoses(primary or secondary)? Other   Does the patient have Home health ordered? Yes   What is the Home health agency?  Home with VNA and family vs SNF.   Is there a DME ordered? No   Prep survey completed? Yes            ANEL PINEDA - Registered Nurse

## 2023-08-11 NOTE — TELEPHONE ENCOUNTER
"Patient's daughter states patient needs new referral for [facility] home health nurses. [facility] [phone]. They were supposed to arrive today but are now not able due to lack of referral.    Reason for Disposition   [1] Follow-up call to recent contact AND [2] information only call, no triage required    Additional Information   Negative: [1] Caller is not with the adult (patient) AND [2] reporting urgent symptoms   Negative: Lab result questions   Negative: Medication questions   Negative: Caller can't be reached by phone   Negative: Caller has already spoken to PCP or another triager   Negative: RN needs further essential information from caller in order to complete triage   Negative: Requesting regular office appointment   Negative: [1] Caller requesting NON-URGENT health information AND [2] PCP's office is the best resource   Negative: Health Information question, no triage required and triager able to answer question   Negative: General information question, no triage required and triager able to answer question   Negative: Question about upcoming scheduled test, no triage required and triager able to answer question   Negative: [1] Caller is not with the adult (patient) AND [2] probable NON-URGENT symptoms    Answer Assessment - Initial Assessment Questions  1. REASON FOR CALL or QUESTION: \"What is your reason for calling today?\" or \"How can I best help you?\" or \"What question do you have that I can help answer?\"      Patient's daughter states patient needs new referral for [facility] home health nurses. [facility] [phone]. They were supposed to arrive today but are now not able due to lack of referral.    Protocols used: Information Only Call - No Triage-ADULT-    "

## 2023-08-11 NOTE — TELEPHONE ENCOUNTER
Called [facility], spoke with nurse Annalise. Gave verbal orders from Dr. Sherman for CBC to be drawn next week with results faxed to our office. Annalise r/fito orders.

## 2023-08-11 NOTE — OUTREACH NOTE
Case Management Call Center Follow-up      Flowsheet Row Responses   [facility] Call Center Tracking Reason? Other (specify in comments)   Other Tracking Comments see note   Has the Call Center Case Management Follow-up issue been resolved? Yes   Follow-up Comments Looks like the referral was in but no order for HH was given at discharge.  The order is now in epic and they should be able to start HH now.            Shannon Epley, RN    8/11/2023, 13:56 CDT

## 2023-08-14 ENCOUNTER — READMISSION MANAGEMENT (OUTPATIENT)
Dept: CALL CENTER | Facility: HOSPITAL | Age: 88
End: 2023-08-14
Payer: MEDICARE

## 2023-08-14 NOTE — CASE MANAGEMENT/SOCIAL WORK
Case Management Discharge Note      Final Note: Home with [facility]. Orders reviewed no further needs.    Provided Post Acute Provider List?: N/A  Provided Post Acute Provider Quality & Resource List?: N/A    Selected Continued Care - Discharged on 8/10/2023 Admission date: 7/31/2023 - Discharge disposition: Home or Self Care      Destination    No services have been selected for the patient.                Durable Medical Equipment    No services have been selected for the patient.                Dialysis/Infusion    No services have been selected for the patient.                Home Medical Care Coordination complete.      Service Provider Selected Services Address Phone Fax Patient Preferred    [facility] ,  [facility] [address], [address] [address] [phone] --              Therapy    No services have been selected for the patient.                Community Resources    No services have been selected for the patient.                Community & DME    No services have been selected for the patient.                    Selected Continued Care - Prior Encounters Includes continued care and service providers with selected services from prior encounters from 5/2/2023 to 8/10/2023      Discharged on 6/19/2023 Admission date: 6/15/2023 - Discharge disposition: Home-Health Care [facility] [place]      Home Medical Care       Service Provider Selected Services Address Phone Fax Patient Preferred    [facility] [address], [address], [place] [id number] [phone] --                      Discharged on 5/4/2023 Admission date: 4/30/2023 - Discharge disposition: Home-Health Care [facility]       Service Provider Selected Services Address Phone Fax Patient Preferred    [facility] [place] [facility] [address] [address]  [phone] --                               Final Discharge Disposition Code: 06 - home with home health care

## 2023-08-14 NOTE — OUTREACH NOTE
Medical Week 1 Survey      Flowsheet Row Responses   [facility] patient discharged from? [place]   Does the patient have one of the following disease processes/diagnoses(primary or secondary)? Other   Week 1 attempt successful? Yes   Call start time 1735   Call end time 1744   Discharge diagnosis Thrombocytopenia   Person spoke with today (if not patient) and relationship Radha, daughter   Meds reviewed with patient/caregiver? Yes   Is the patient having any side effects they believe may be caused by any medication additions or changes? No   Does the patient have all medications ordered at discharge? Yes   Is the patient taking all medications as directed (includes completed medication regime)? Yes   Does the patient have a primary care provider?  Yes   Does the patient have an appointment with their PCP within 7 days of discharge? Yes   Has the patient kept scheduled appointments due by today? N/A   What [facility] agency?  [facility]   Has home health visited the patient within 72 hours of discharge? Yes   Psychosocial issues? No   Comments pt has Hoffman catheter, HH has changed Hoffman   Did the patient receive a copy of their discharge instructions? Yes   Nursing interventions Reviewed instructions with patient   What is the patient's perception of their health status since discharge? Improving   Is the patient/caregiver able to teach back signs and symptoms related to disease process for when to call PCP? Yes   Is the patient/caregiver able to teach back signs and symptoms related to disease process for when to call 911? Yes   Is the patient/caregiver able to teach back the hierarchy of who to call/visit for symptoms/problems? PCP, Specialist, Home health nurse, Urgent Care, ED, 911 Yes   Additional teach back comments states urine output WNL's with Hoffman, has constipation, ususal methods have been ineffective, has limits due to calorie count and watching sugars   Week 1 call completed? Yes   Call end  time 7120            Nadia HILL - Registered Nurse

## 2023-08-16 ENCOUNTER — APPOINTMENT (OUTPATIENT)
Dept: CT IMAGING | Facility: HOSPITAL | Age: 88
End: 2023-08-16
Payer: MEDICARE

## 2023-08-16 ENCOUNTER — APPOINTMENT (OUTPATIENT)
Dept: GENERAL RADIOLOGY | Facility: HOSPITAL | Age: 88
End: 2023-08-16
Payer: MEDICARE

## 2023-08-16 ENCOUNTER — HOSPITAL ENCOUNTER (EMERGENCY)
Facility: HOSPITAL | Age: 88
Discharge: HOME | End: 2023-08-16
Attending: EMERGENCY MEDICINE
Payer: MEDICARE

## 2023-08-16 VITALS
TEMPERATURE: 98.8 F | RESPIRATION RATE: 18 BRPM | DIASTOLIC BLOOD PRESSURE: 88 MMHG | HEART RATE: 84 BPM | SYSTOLIC BLOOD PRESSURE: 178 MMHG | WEIGHT: 165 LBS | HEIGHT: 56 IN | BODY MASS INDEX: 37.12 KG/M2 | OXYGEN SATURATION: 96 %

## 2023-08-16 DIAGNOSIS — B02.9 HERPES ZOSTER WITHOUT COMPLICATION: ICD-10-CM

## 2023-08-16 DIAGNOSIS — R53.1 GENERALIZED WEAKNESS: ICD-10-CM

## 2023-08-16 DIAGNOSIS — R10.9 LEFT LATERAL ABDOMINAL PAIN: Primary | ICD-10-CM

## 2023-08-16 DIAGNOSIS — N18.9 CHRONIC KIDNEY DISEASE, UNSPECIFIED CKD STAGE: ICD-10-CM

## 2023-08-16 LAB
ALBUMIN SERPL-MCNC: 3.6 G/DL (ref 3.5–5.2)
ALBUMIN/GLOB SERPL: 1.2 G/DL
ALP SERPL-CCNC: 36 U/L (ref 39–117)
ALT SERPL W P-5'-P-CCNC: 23 U/L (ref 1–33)
ANION GAP SERPL CALCULATED.3IONS-SCNC: 13 MMOL/L (ref 5–15)
AST SERPL-CCNC: 22 U/L (ref 1–32)
BACTERIA UR QL AUTO: ABNORMAL /HPF
BASOPHILS # BLD AUTO: 0.01 10*3/MM3 (ref 0–0.2)
BASOPHILS NFR BLD AUTO: 0.2 % (ref 0–1.5)
BILIRUB SERPL-MCNC: 0.5 MG/DL (ref 0–1.2)
BILIRUB UR QL STRIP: NEGATIVE
BUN SERPL-MCNC: 55 MG/DL (ref 8–23)
BUN/CREAT SERPL: 38.7 (ref 7–25)
CALCIUM SPEC-SCNC: 8.8 MG/DL (ref 8.2–9.6)
CHLORIDE SERPL-SCNC: 99 MMOL/L (ref 98–107)
CLARITY UR: CLEAR
CO2 SERPL-SCNC: 24 MMOL/L (ref 22–29)
COLOR UR: YELLOW
CREAT SERPL-MCNC: 1.42 MG/DL (ref 0.57–1)
DEPRECATED RDW RBC AUTO: 62.1 FL (ref 37–54)
EGFRCR SERPLBLD CKD-EPI 2021: 34.8 ML/MIN/1.73
EOSINOPHIL # BLD AUTO: 0.01 10*3/MM3 (ref 0–0.4)
EOSINOPHIL NFR BLD AUTO: 0.2 % (ref 0.3–6.2)
ERYTHROCYTE [DISTWIDTH] IN BLOOD BY AUTOMATED COUNT: 20.6 % (ref 12.3–15.4)
GEN 5 2HR TROPONIN T REFLEX: 107 NG/L
GLOBULIN UR ELPH-MCNC: 3.1 GM/DL
GLUCOSE SERPL-MCNC: 131 MG/DL (ref 65–99)
GLUCOSE UR STRIP-MCNC: NEGATIVE MG/DL
HCT VFR BLD AUTO: 33.2 % (ref 34–46.6)
HGB BLD-MCNC: 11 G/DL (ref 12–15.9)
HGB UR QL STRIP.AUTO: NEGATIVE
HOLD SPECIMEN: NORMAL
HOLD SPECIMEN: NORMAL
HYALINE CASTS UR QL AUTO: ABNORMAL /LPF
IMM GRANULOCYTES # BLD AUTO: 0.03 10*3/MM3 (ref 0–0.05)
IMM GRANULOCYTES NFR BLD AUTO: 0.5 % (ref 0–0.5)
KETONES UR QL STRIP: NEGATIVE
LEUKOCYTE ESTERASE UR QL STRIP.AUTO: ABNORMAL
LIPASE SERPL-CCNC: 28 U/L (ref 13–60)
LYMPHOCYTES # BLD AUTO: 0.67 10*3/MM3 (ref 0.7–3.1)
LYMPHOCYTES NFR BLD AUTO: 11.5 % (ref 19.6–45.3)
MAGNESIUM SERPL-MCNC: 1.9 MG/DL (ref 1.7–2.3)
MCH RBC QN AUTO: 29.1 PG (ref 26.6–33)
MCHC RBC AUTO-ENTMCNC: 33.1 G/DL (ref 31.5–35.7)
MCV RBC AUTO: 87.8 FL (ref 79–97)
MONOCYTES # BLD AUTO: 0.33 10*3/MM3 (ref 0.1–0.9)
MONOCYTES NFR BLD AUTO: 5.7 % (ref 5–12)
NEUTROPHILS NFR BLD AUTO: 4.77 10*3/MM3 (ref 1.7–7)
NEUTROPHILS NFR BLD AUTO: 81.9 % (ref 42.7–76)
NITRITE UR QL STRIP: NEGATIVE
NRBC BLD AUTO-RTO: 0.2 /100 WBC (ref 0–0.2)
OTHER OBSERVATIONS IN URINE MICRO: ABNORMAL /HPF
PH UR STRIP.AUTO: 5.5 [PH] (ref 5–8)
PLATELET # BLD AUTO: 68 10*3/MM3 (ref 140–450)
PMV BLD AUTO: 10.7 FL (ref 6–12)
POTASSIUM SERPL-SCNC: 4 MMOL/L (ref 3.5–5.2)
PROT SERPL-MCNC: 6.7 G/DL (ref 6–8.5)
PROT UR QL STRIP: NEGATIVE
QT INTERVAL: 433 MS
RBC # BLD AUTO: 3.78 10*6/MM3 (ref 3.77–5.28)
RBC # UR STRIP: ABNORMAL /HPF
REF LAB TEST METHOD: ABNORMAL
SODIUM SERPL-SCNC: 136 MMOL/L (ref 136–145)
SP GR UR STRIP: <1.005 (ref 1–1.03)
SQUAMOUS #/AREA URNS HPF: ABNORMAL /HPF
TROPONIN T DELTA: -5 NG/L
TROPONIN T SERPL HS-MCNC: 112 NG/L
UROBILINOGEN UR QL STRIP: ABNORMAL
WBC # UR STRIP: ABNORMAL /HPF
WBC NRBC COR # BLD: 5.82 10*3/MM3 (ref 3.4–10.8)
WHOLE BLOOD HOLD COAG: NORMAL
WHOLE BLOOD HOLD SPECIMEN: NORMAL

## 2023-08-16 PROCEDURE — 93010 ELECTROCARDIOGRAM REPORT: CPT | Performed by: INTERNAL MEDICINE

## 2023-08-16 PROCEDURE — 84484 ASSAY OF TROPONIN QUANT: CPT

## 2023-08-16 PROCEDURE — 83690 ASSAY OF LIPASE: CPT | Performed by: PHYSICIAN ASSISTANT

## 2023-08-16 PROCEDURE — 93005 ELECTROCARDIOGRAM TRACING: CPT

## 2023-08-16 PROCEDURE — 71045 X-RAY EXAM CHEST 1 VIEW: CPT

## 2023-08-16 PROCEDURE — 74176 CT ABD & PELVIS W/O CONTRAST: CPT

## 2023-08-16 PROCEDURE — 81001 URINALYSIS AUTO W/SCOPE: CPT

## 2023-08-16 PROCEDURE — 25010000002 FENTANYL CITRATE (PF) 50 MCG/ML SOLUTION: Performed by: EMERGENCY MEDICINE

## 2023-08-16 PROCEDURE — 80053 COMPREHEN METABOLIC PANEL: CPT

## 2023-08-16 PROCEDURE — 85025 COMPLETE CBC W/AUTO DIFF WBC: CPT

## 2023-08-16 PROCEDURE — 83735 ASSAY OF MAGNESIUM: CPT

## 2023-08-16 PROCEDURE — 36415 COLL VENOUS BLD VENIPUNCTURE: CPT

## 2023-08-16 PROCEDURE — 96374 THER/PROPH/DIAG INJ IV PUSH: CPT

## 2023-08-16 PROCEDURE — 99284 EMERGENCY DEPT VISIT MOD MDM: CPT

## 2023-08-16 RX ORDER — SODIUM CHLORIDE 0.9 % (FLUSH) 0.9 %
10 SYRINGE (ML) INJECTION AS NEEDED
Status: DISCONTINUED | OUTPATIENT
Start: 2023-08-16 | End: 2023-08-16 | Stop reason: HOSPADM

## 2023-08-16 RX ORDER — FENTANYL CITRATE 50 UG/ML
50 INJECTION, SOLUTION INTRAMUSCULAR; INTRAVENOUS ONCE
Status: COMPLETED | OUTPATIENT
Start: 2023-08-16 | End: 2023-08-16

## 2023-08-16 RX ADMIN — FENTANYL CITRATE 50 MCG: 50 INJECTION, SOLUTION INTRAMUSCULAR; INTRAVENOUS at 17:09

## 2023-08-16 NOTE — ED TRIAGE NOTES
Pt arrives in a wheelchair to triage for left sided abdominal pain and weakness for the past week. Pt denies n/v/d. Pt reports the abdominal pain is sharp in nature.

## 2023-08-16 NOTE — ED PROVIDER NOTES
MD ATTESTATION NOTE    The LUCRETIA and I have discussed this patient's history, physical exam, and treatment plan.  I have reviewed the documentation and personally had a face to face interaction with the patient. I affirm the documentation and agree with the treatment and plan.  The attached note describes my personal findings.      I provided a substantive portion of the care of the patient.  I personally performed the physical exam in its entirety, and below are my findings.        Brief HPI: Patient presents for evaluation of left lower quadrant abdominal pain.  Patient states this is been going on for about 10 days.  No vomiting or diarrhea.  Patient has had recent shingles    PHYSICAL EXAM  ED Triage Vitals   Temp Heart Rate Resp BP SpO2   08/16/23 1448 08/16/23 1448 08/16/23 1448 08/16/23 1511 08/16/23 1448   97 øF (36.1 øC) 65 16 170/66 98 %      Temp src Heart Rate Source Patient Position BP Location FiO2 (%)   08/16/23 1448 08/16/23 1448 08/16/23 1511 08/16/23 1511 --   Tympanic Monitor Sitting Right arm          GENERAL: no acute distress  HENT: nares patent  EYES: no scleral icterus  CV: regular rhythm, normal rate  RESPIRATORY: normal effort  ABDOMEN: soft.  Rash to left lower abdomen  MUSCULOSKELETAL: no deformity  NEURO: alert, moves all extremities, follows commands  PSYCH:  calm, cooperative  SKIN: warm, dry    Vital signs and nursing notes reviewed.        Plan: Lab evaluation and CT scan       Nishant Uribe MD  08/16/23 [id number]

## 2023-08-17 NOTE — ED PROVIDER NOTES
EMERGENCY DEPARTMENT ENCOUNTER    Room Number:  08/08  PCP: Mariah Hickman MD  Discussed/ obtained information from independent historians: patient, daughter, granddaughter      HPI:  Chief Complaint: left sided abdominal pain  A complete HPI/ROS/PMH/PSH/SH/FH are unobtainable due to: none  Context: Helena Carmen is a 89 y.o. female who presents to the ED c/o left-sided abdominal pain for approximately 10 days.  She states it is intermittent, waxes and wanes, nothing makes it worse or better.  She denies any nausea vomiting or diarrhea, does deal with chronic constipation which is reportedly no worse than usual.  She denies any hematuria dysuria urinary frequency, fevers chills chest pain or shortness of breath.  She did develop shingles around the same time.  She has been generally weak today.  Lives with granddaughter and daughter who care for her and provide most of the history.    External (non-ED) record review: Patient admitted 7/31/2023 to 8/10/2023.  She presented to the hospital with generalized weakness, work-up revealed thrombocytopenia, treated with IV steroids and was supposed to be starting Rituxan but was found to have shingles and as well as positive hepatitis B core antibody and surface antibody and reduction was canceled and if she received IVIG.  She was discharged on p.o. steroids to go to rehab but changed her mind and went home with family support.      PAST MEDICAL HISTORY  Active Ambulatory Problems     Diagnosis Date Noted    Aortic valve stenosis 04/05/2016    Fatigue 04/05/2016    MI (mitral incompetence) 04/05/2016    PVC (premature ventricular contraction) 04/05/2016    Legally blind 05/25/2018    Essential hypertension 05/25/2018    Hypertriglyceridemia 05/31/2018    Pulmonary hypertension 06/25/2020    Paroxysmal atrial fibrillation 06/25/2020    Anxiety 07/10/2014    Stage 4 chronic kidney disease 03/20/2015    Chronic pain disorder 01/26/2022    Degeneration of lumbar  intervertebral disc 09/09/2014    Type 2 diabetes mellitus with hyperglycemia 01/26/2022    Esophageal reflux 07/10/2014    Gastroparesis 01/26/2022    Hiatal hernia 01/26/2022    Iatrogenic hypothyroidism 07/08/2014    Macular degeneration 01/26/2022    Malignant neoplasm of uterus 01/26/2022    Osteoarthritis of knee 07/10/2014    Peripheral nerve disease 07/10/2014    Secondary hyperparathyroidism 04/05/2016    Thrombocytopenia 07/11/2014    Chronic heart failure with preserved ejection fraction 03/02/2022    Other cirrhosis of liver 03/03/2022    Hypothyroidism 02/22/2022    Nonrheumatic tricuspid valve regurgitation 02/22/2022    Anemia, chronic disease 07/30/2022    Hyponatremia 07/30/2022    Closed fracture of fifth lumbar vertebra 08/24/2022    Closed fracture of fifth lumbar vertebra, unspecified fracture morphology, initial encounter 08/25/2022    Diabetic polyneuropathy associated with type 2 diabetes mellitus 08/26/2022    Chronic ITP (idiopathic thrombocytopenia) 08/28/2022    Chronic midline low back pain with bilateral sciatica 10/16/2022    Acute deep vein thrombosis of calf, bilateral 10/17/2022    Compression fracture of L5 vertebra with routine healing 10/17/2022    Acute left-sided low back pain with left-sided sciatica 03/07/2023    Urine retention 03/10/2023    Rib fracture 03/14/2023    Acute on chronic combined systolic and diastolic congestive heart failure 04/04/2023    Acute UTI 04/30/2023    Blindness right eye category 3, blindness left eye category 3 03/09/2022    Cognitive communication deficit 03/09/2022    Pseudomonas (aeruginosa) (mallei) (pseudomallei) as the cause of diseases classified elsewhere 06/09/2022    Multifocal pneumonia 06/15/2023     Resolved Ambulatory Problems     Diagnosis Date Noted    Diastolic dysfunction 04/05/2016    Hypertensive pulmonary vascular disease (HCC) 04/05/2016    Bioprosthetic aortic valve replacement 05/10/2017    Breast screening 07/08/2014     Abdominal pain, right lower quadrant 12/13/2020    Allergic rhinitis 07/10/2014    Angina pectoris 07/10/2014    Pancreatitis 01/26/2022    Colitis due to Clostridioides difficile 01/26/2022    Hypothyroidism 01/26/2022    Long term (current) use of opiate analgesic 01/26/2022    Uncontrolled type 2 diabetes mellitus 07/08/2014    Benign essential hypertension 07/08/2014    Hypertension 01/26/2022    Pulmonary hypertension 04/05/2016    Hypercholesterolemia 01/26/2022    Hyperlipidemia 07/08/2014    Legal blindness 05/25/2018    Mitral valve disorder 07/10/2014    Mitral valve regurgitation 04/05/2016    Ventricular premature beats 04/05/2016    History of aortic valve replacement 05/10/2017    Nonrheumatic tricuspid valve regurgitation     Elevated serum creatinine     CHF (congestive heart failure) 03/02/2022    Hypertensive disorder 02/22/2022    Pancreatitis 02/22/2022    Sepsis 06/05/2022    COVID-19 07/30/2022    Weakness generalized 07/30/2022    Leukopenia 08/02/2022    Cytokine release syndrome, grade 1 08/02/2022    Urine retention 08/02/2022    COVID-19 virus infection 08/06/2022    Acute DVT (deep venous thrombosis) 08/07/2022    Acute diastolic congestive heart failure 12/20/2022    Esophageal dysphagia 12/24/2022    Stage 3b chronic kidney disease 03/20/2015    Acute on chronic diastolic (congestive) heart failure 03/09/2022     Past Medical History:   Diagnosis Date    Back pain     CKD (chronic kidney disease)     Diverticulosis     Exertional shortness of breath     Heart disease     Hyperthyroidism     L5 compression fracture (HCC) 08/2022    Left ventricular hypertrophy     Liver disease     Low back pain     Mitral regurgitation     Osteoarthritis of hip     Osteoporosis     Peripheral neuropathy     Premature ventricular contractions     Renal insufficiency syndrome     Type 2 diabetes mellitus     Uterine cancer          PAST SURGICAL HISTORY  Past Surgical History:   Procedure Laterality Date     AORTIC VALVE REPAIR/REPLACEMENT      APPENDECTOMY      CATARACT EXTRACTION      ,     CHOLECYSTECTOMY      COLONOSCOPY      ENDOSCOPY  08/15/2014    no gross lesions in stomach/duodenum, erythrematous mucosa in stomach    EPIDURAL BLOCK      HYSTERECTOMY  2007    STERNOTOMY      UPPER GASTROINTESTINAL ENDOSCOPY           FAMILY HISTORY  Family History   Problem Relation Age of Onset    Heart disease Mother     Hypertension Mother     Stroke Mother     Diabetes Mother         mellitus    Other Other         cardiovascular disorder         SOCIAL HISTORY  Social History     Socioeconomic History    Marital status:    Tobacco Use    Smoking status: Former     Packs/day: 0.50     Types: Cigarettes     Quit date: 7/10/1969     Years since quittin.1    Smokeless tobacco: Never   Vaping Use    Vaping Use: Never used   Substance and Sexual Activity    Alcohol use: No     Comment: caffeine use - coffee 2 cups daily    Drug use: No    Sexual activity: Defer         ALLERGIES  Baclofen, Erythromycin, Statins, Cephalexin, Penicillins, and Sulfa antibiotics        REVIEW OF SYSTEMS  Review of Systems         PHYSICAL EXAM  ED Triage Vitals   Temp Heart Rate Resp BP SpO2   23 1448 23 1448 23 1448 23 1511 23 1448   97 øF (36.1 øC) 65 16 170/66 98 %      Temp src Heart Rate Source Patient Position BP Location FiO2 (%)   23 1448 23 1448 23 1511 23 1511 --   Tympanic Monitor Sitting Right arm        Physical Exam      GENERAL: no acute distress  HENT: normocephalic, atraumatic  EYES: no scleral icterus  CV: regular rhythm, normal rate  RESPIRATORY: normal effort CTA B  ABDOMEN: nondistended soft nontender normal bowel sounds no guarding or rigidity.  She does have a large area of resolving shingles rash to her left flank and back in the area of her pain and palpation of this area directly reproduces her pain  MUSCULOSKELETAL: no deformity  NEURO: alert,  moves all extremities, follows commands  PSYCH:  calm, cooperative  SKIN: warm, dry    Vital signs and nursing notes reviewed.          LAB RESULTS  Recent Results (from the past 24 hour(s))   ECG 12 Lead ED Triage Standing Order; Weak / Dizzy / AMS    Collection Time: 08/16/23  3:12 PM   Result Value Ref Range    QT Interval 433 ms   Comprehensive Metabolic Panel    Collection Time: 08/16/23  3:19 PM    Specimen: Blood   Result Value Ref Range    Glucose 131 (H) 65 - 99 mg/dL    BUN 55 (H) 8 - 23 mg/dL    Creatinine 1.42 (H) 0.57 - 1.00 mg/dL    Sodium 136 136 - 145 mmol/L    Potassium 4.0 3.5 - 5.2 mmol/L    Chloride 99 98 - 107 mmol/L    CO2 24.0 22.0 - 29.0 mmol/L    Calcium 8.8 8.2 - 9.6 mg/dL    Total Protein 6.7 6.0 - 8.5 g/dL    Albumin 3.6 3.5 - 5.2 g/dL    ALT (SGPT) 23 1 - 33 U/L    AST (SGOT) 22 1 - 32 U/L    Alkaline Phosphatase 36 (L) 39 - 117 U/L    Total Bilirubin 0.5 0.0 - 1.2 mg/dL    Globulin 3.1 gm/dL    A/G Ratio 1.2 g/dL    BUN/Creatinine Ratio 38.7 (H) 7.0 - 25.0    Anion Gap 13.0 5.0 - 15.0 mmol/L    eGFR 34.8 (L) >60.0 mL/min/1.73   Single High Sensitivity Troponin T    Collection Time: 08/16/23  3:19 PM    Specimen: Blood   Result Value Ref Range    HS Troponin T 112 (C) <10 ng/L   Magnesium    Collection Time: 08/16/23  3:19 PM    Specimen: Blood   Result Value Ref Range    Magnesium 1.9 1.7 - 2.3 mg/dL   Green Top (Gel)    Collection Time: 08/16/23  3:19 PM   Result Value Ref Range    Extra Tube Hold for add-ons.    Lavender Top    Collection Time: 08/16/23  3:19 PM   Result Value Ref Range    Extra Tube hold for add-on    Gold Top - SST    Collection Time: 08/16/23  3:19 PM   Result Value Ref Range    Extra Tube Hold for add-ons.    Light Blue Top    Collection Time: 08/16/23  3:19 PM   Result Value Ref Range    Extra Tube Hold for add-ons.    CBC Auto Differential    Collection Time: 08/16/23  3:19 PM    Specimen: Blood   Result Value Ref Range    WBC 5.82 3.40 - 10.80 10*3/mm3    RBC  3.78 3.77 - 5.28 10*6/mm3    Hemoglobin 11.0 (L) 12.0 - 15.9 g/dL    Hematocrit 33.2 (L) 34.0 - 46.6 %    MCV 87.8 79.0 - 97.0 fL    MCH 29.1 26.6 - 33.0 pg    MCHC 33.1 31.5 - 35.7 g/dL    RDW 20.6 (H) 12.3 - 15.4 %    RDW-SD 62.1 (H) 37.0 - 54.0 fl    MPV 10.7 6.0 - 12.0 fL    Platelets 68 (L) 140 - 450 10*3/mm3    Neutrophil % 81.9 (H) 42.7 - 76.0 %    Lymphocyte % 11.5 (L) 19.6 - 45.3 %    Monocyte % 5.7 5.0 - 12.0 %    Eosinophil % 0.2 (L) 0.3 - 6.2 %    Basophil % 0.2 0.0 - 1.5 %    Immature Grans % 0.5 0.0 - 0.5 %    Neutrophils, Absolute 4.77 1.70 - 7.00 10*3/mm3    Lymphocytes, Absolute 0.67 (L) 0.70 - 3.10 10*3/mm3    Monocytes, Absolute 0.33 0.10 - 0.90 10*3/mm3    Eosinophils, Absolute 0.01 0.00 - 0.40 10*3/mm3    Basophils, Absolute 0.01 0.00 - 0.20 10*3/mm3    Immature Grans, Absolute 0.03 0.00 - 0.05 10*3/mm3    nRBC 0.2 0.0 - 0.2 /100 WBC   Lipase    Collection Time: 08/16/23  3:19 PM    Specimen: Blood   Result Value Ref Range    Lipase 28 13 - 60 U/L   Urinalysis With Microscopic If Indicated (No Culture) - Urine, Clean Catch    Collection Time: 08/16/23  4:50 PM    Specimen: Urine, Clean Catch   Result Value Ref Range    Color, UA Yellow Yellow, Straw    Appearance, UA Clear Clear    pH, UA 5.5 5.0 - 8.0    Specific Gravity, UA <1.005 (L) 1.005 - 1.030    Glucose, UA Negative Negative    Ketones, UA Negative Negative    Bilirubin, UA Negative Negative    Blood, UA Negative Negative    Protein, UA Negative Negative    Leuk Esterase, UA Trace (A) Negative    Nitrite, UA Negative Negative    Urobilinogen, UA 0.2 E.U./dL 0.2 - 1.0 E.U./dL   Urinalysis, Microscopic Only - Urine, Clean Catch    Collection Time: 08/16/23  4:50 PM    Specimen: Urine, Clean Catch   Result Value Ref Range    RBC, UA 0-2 None Seen, 0-2 /HPF    WBC, UA 0-2 None Seen, 0-2 /HPF    Bacteria, UA Trace (A) None Seen /HPF    Squamous Epithelial Cells, UA 0-2 None Seen, 0-2 /HPF    Hyaline Casts, UA None Seen None Seen /LPF     Methodology Automated Microscopy     Observations, UA Indinavir crystal None Seen /HPF   High Sensitivity Troponin T 2Hr    Collection Time: 08/16/23  6:37 PM    Specimen: Blood   Result Value Ref Range    HS Troponin T 107 (C) <10 ng/L    Troponin T Delta -5 (L) >=-4 - <+4 ng/L       Ordered the above labs and reviewed the results.        RADIOLOGY  CT Abdomen Pelvis Without Contrast    Result Date: 8/16/2023  CT ABDOMEN PELVIS WO CONTRAST-  Radiation dose reduction techniques were utilized, including automated exposure control and exposure modulation based on body size.  Clinical: Left-sided abdominal pain  COMPARISON 5/1/2023  FINDINGS: Lung bases improved, only trace pleural fluid deep in the costophrenic sulci seen at this time. There is cardiac enlargement.  There is mesh again demonstrated along the anterior abdominal wall with diastases of the rectus sheath as before.  The liver, pancreas, spleen and adrenal glands are satisfactory in appearance. No biliary duct dilatation status post cholecystectomy.  The stomach is collapsed, contour within normal limits, no duodenal abnormality seen.  Tiny bilateral hyperdense renal cortical nodules again seen, likely proteinaceous cysts. Few simple right renal pelvic cysts also seen. No calculus or obstructive uropathy, the left ureter is normal in course and caliber to the urinary bladder which is collapsed, Hoffman catheter within its lumen.  Diverticulosis of the colon, no diverticulitis. Caliber of the large and small bowel is within normal limits. No acute large or small bowel abnormality seen.  CONCLUSION: Stable CT of the abdomen and pelvis. Improved lung bases as scribed above.     This report was finalized on 8/16/2023 6:24 PM by Dr. Art Fabian M.D.      XR Chest 1 View    Result Date: 8/16/2023  XR CHEST 1 VW-  HISTORY: Weak/dizzy/altered mental status.  COMPARISON: Chest radiograph 6/17/2023  FINDINGS:  A single view of the chest was obtained. The cardiac  silhouette is enlarged. There are dense mitral annular calcifications. There is calcific atherosclerosis. There is bronchial wall thickening, which can be seen with small airways disease or bronchitis. There are very mild bibasilar opacities, left greater than right, which could reflect atelectasis or pneumonia in the appropriate clinical setting. There are suspected trace bilateral pleural effusions. Sternotomy wires are present.  This report was finalized on 8/16/2023 4:11 PM by Dr. Gena Jarvis M.D.       Ordered the above noted radiological studies. Reviewed by me in PACS.            PROCEDURES  Procedures              MEDICATIONS GIVEN IN ER  Medications   sodium chloride 0.9 % flush 10 mL (has no administration in time range)   fentaNYL citrate (PF) (SUBLIMAZE) injection 50 mcg (50 mcg Intravenous Given 8/16/23 1709)                   MEDICAL DECISION MAKING, PROGRESS, and CONSULTS    All labs have been independently reviewed by me.  All radiology studies have been reviewed by me and I have also reviewed the radiology report.   EKG's independently viewed and interpreted by me.  Discussion below represents my analysis of pertinent findings related to patient's condition, differential diagnosis, treatment plan and final disposition.            Orders placed during this visit:  Orders Placed This Encounter   Procedures    XR Chest 1 View    CT Abdomen Pelvis Without Contrast    [place] Draw    Comprehensive Metabolic Panel    Single High Sensitivity Troponin T    Magnesium    Urinalysis With Microscopic If Indicated (No Culture) - Urine, Clean Catch    CBC Auto Differential    High Sensitivity Troponin T 2Hr    Lipase    Urinalysis, Microscopic Only - Urine, Clean Catch    NPO Diet NPO Type: Strict NPO    Undress & Gown    Continuous Pulse Oximetry    Vital Signs    Orthostatic Blood Pressure    Oxygen Therapy- Nasal Cannula; Titrate 1-6 LPM Per SpO2; 90 - 95%    POC Glucose Once    ECG 12 Lead ED Triage  Standing Order; Weak / Dizzy / AMS    Insert Peripheral IV    Fall Precautions    CBC & Differential    Green Top (Gel)    Lavender Top    Gold Top - SST    Light Blue Top           Differential diagnosis:  Differential diagnosis includes but is not limited to:  - hepatobiliary pathology such as cholecystitis, cholangitis, and symptomatic cholelithiasis  - Pancreatitis  - Dyspepsia  - Small bowel obstruction  - Appendicitis  - Diverticulitis  - UTI including pyelonephritis  - Ureteral stone  - Zoster  - Colitis, including infectious and ischemic  - Atypical ACS        Independent interpretation of labs, radiology studies, and discussions with consultants:  ED Course as of 08/16/23 2019   Wed Aug 16, 2023   1630 HS Troponin T(!!): 112 [KA]   1630 WBC: 5.82 [KA]   1630 Hemoglobin(!): 11.0 [KA]   1630 Glucose(!): 131 [KA]   1631 Creatinine(!): 1.42  Chronic, stable, 1.38 six days ago [KA]   1631 Magnesium: 1.9 [KA]   1757 Lipase: 28 [KA]   2016 I reassessed the patient, she is resting.  Counseled her and family about all of her lab and imaging results.  Area of her pain is in the area where she has shingles on her left flank.  She has quite a large area of healing lesions and palpation of this directly reproduces her pain.  I Offered admission for further evaluation of generalized weakness, they declined.  They feel comfortable taking her home and would prefer to.  She has physical therapy coming to the house and an appointment with them tomorrow.  She has some constipation and is on a pretty comprehensive bowel regimen, does not tolerate MiraLAX due to severe abdominal cramping.  Still experiences constipation time to time, no significant constipation today.  I recommended that they further troubleshoot this with her gastroenterologist.  They should return to the ER for any worsening symptoms or any new concerns.  Patient and family agreeable, she is eager for discharge. [KA]   2018 Platelets(!): 68  .   Thrombocytopenia is chronic and has varied from 65-97 over the course of the last 10 days. [KA]      ED Course User Index  [KA] Federica Sanchez PA       - Shared decision making: Offered admission, patient and family declined, prefer follow-up as an outpatient      Additional sources:    - Chronic or social conditions impacting care: Age, mobility limitations, blindness, lives with family who care for her          DIAGNOSIS  Final diagnoses:   Left lateral abdominal pain   Herpes zoster without complication   Chronic kidney disease, unspecified CKD stage   Generalized weakness           Follow Up:  Mariah Hickman MD  [address]  [facility] [id number] [phone]    In 2 days      [facility] EMERGENCY DEPARTMENT  [address] [facility] [address] [phone]    If symptoms worsen      RX:     Medication List      No changes were made to your prescriptions during this visit.         Latest Documented Vital Signs:  As of 20:19 EDT  BP- 170/66 HR- 59 Temp- 97 øF (36.1 øC) (Tympanic) O2 sat- 95%              --    Please note that portions of this were completed with a voice recognition program.       Note Disclaimer: At [facility], we believe that sharing information builds trust and better relationships. You are receiving this note because you are receiving care at [facility] or recently visited. It is possible you will see health information before a provider has talked with you about it. This kind of information can be easy to misunderstand. To help you fully understand what it means for your health, we urge you to discuss this note with your provider.             Federica Sanchez PA  08/16/23 2034

## 2023-08-23 ENCOUNTER — READMISSION MANAGEMENT (OUTPATIENT)
Dept: CALL CENTER | Facility: HOSPITAL | Age: 88
End: 2023-08-23
Payer: MEDICARE

## 2023-08-23 NOTE — OUTREACH NOTE
Medical Week 2 Survey      Flowsheet Row Responses   [facility] patient discharged from? [facility]   Does the patient have one of the following disease processes/diagnoses(primary or secondary)? Other   Week 2 attempt successful? No   Unsuccessful attempts Attempt 1            Julio PINEDA - Registered Nurse

## 2023-08-23 NOTE — PROGRESS NOTES
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  She received high-dose IV steroids and eventually received IVIG 1 g/kg (45 g) x2 days beginning 8/4/2023.  We had initially discussed use of rituximab however she had positive hepatitis B core antibody and after evaluation by GI, felt that there was some risk for reactivation of hepatitis B and that she would require antiviral therapy if rituximab was administered.  She had significant response to IVIG and steroids, at the time of discharge on 8/10/2023, platelet count was 87,000 and she was continued at discharge on prednisone 20 mg daily.  During her hospital stay she also developed shingles and was treated with Valtrex x14 days completed on 8/16/2023.  She has had some difficulty with postherpetic neuralgia, had left abdominal pain and was seen in the emergency department on 8/16/2023, CT showed no acute process and ultimately her pain was attributed to postherpetic neuralgia.  She is on gabapentin, previously had been on 600 mg twice daily however dose in the hospital was decreased to 300 mg twice daily for unclear reasons.  She does have diabetes that has been exacerbated by prednisone and does continue on sliding scale insulin in addition to Lantus.  Patient is accompanied today by her daughter.  She does have home health following her with ability to draw weekly labs.  She has not experienced any bleeding issues, does have difficulty with easy bruising.  She is in a wheelchair currently but is  "able to ambulate at home with the use of a walker.      Objective   Vital Signs:   BP (!) 197/49 Comment: Pt took BP meds an hour ago  Pulse 58   Temp 97.7 øF (36.5 øC) (Temporal)   Resp 18   Ht 142.2 cm (55.98\")   Wt 76.6 kg (168 lb 12.8 oz)   SpO2 96%   BMI 37.87 kg/mý     Physical Exam  Constitutional:       Appearance: She is well-developed.      Comments: Elderly female no distress seated in a wheelchair   Eyes:      Conjunctiva/sclera: Conjunctivae normal.   Neck:      Thyroid: No thyromegaly.   Cardiovascular:      Rate and Rhythm: Normal rate and regular rhythm.      Heart sounds: Murmur heard.     No friction rub. No gallop.   Pulmonary:      Effort: No respiratory distress.      Breath sounds: Normal breath sounds.   Abdominal:      General: Bowel sounds are normal. There is no distension.      Palpations: Abdomen is soft.      Tenderness: There is no abdominal tenderness.   Musculoskeletal:         General: Swelling present.      Comments: Trace bilateral lower extremity edema   Lymphadenopathy:      Head:      Right side of head: No submandibular adenopathy.      Cervical: No cervical adenopathy.      Upper Body:      Right upper body: No supraclavicular adenopathy.      Left upper body: No supraclavicular adenopathy.   Skin:     General: Skin is warm and dry.      Findings: No rash.      Comments: Scattered ecchymoses predominantly on the forearms bilaterally.  Shingles rash in the lower left abdominal wall is resolving   Neurological:      Mental Status: She is alert and oriented to person, place, and time.      Cranial Nerves: No cranial nerve deficit.      Motor: No abnormal muscle tone.      Deep Tendon Reflexes: Reflexes normal.   Psychiatric:         Behavior: Behavior normal.          Result Review : Reviewed CBC, CMP, IPF from today.       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  Additional labs 3/3/2022 with positive BECCA at 1: 320 dilution with homogeneous pattern, negative BECCA panel  On 3/4/2022, B12 low normal at 238, folate greater than 20, negative serum protein electrophoresis and immunoelectrophoresis with free kappa light chain 65.6, free lambda light chain 37.1 and free light chain ratio 1.77 (appropriate for degree of renal dysfunction), LDH borderline elevated 238  CT abdomen and pelvis 3/8/2022 with liver morphology suspicious for chronic liver disease, spleen normal in size at 10.4 cm.  No other abnormalities.  Concern for possible ITP, initiated steroids on 3/3/2022 with prednisone 30 mg daily  Platelet count improved, up to 90,000 on 3/6/2022, prednisone tapered to 20 mg daily.    Unclear whether  improvement in thrombocytopenia was related to steroids, B12 replacement, or improvement in CHF/volume overload.  Recommended decreasing prednisone dose down to 15 mg daily prior to transition to subacute nursing facility on 3/9/2022.  Patient however was discharged on prednisone 20 mg daily.  Requested that nursing facility forward labs weekly monitor platelet count and further gradually taper prednisone dose however this did not occur.  On 3/23/2022, platelet count was 64,000.  Tapered prednisone from 20 down to 15 mg daily.  Intend to maintain platelet count above the 50-68135 range.  Consider additional treatment options with IVIG or rituximab if platelet count declines into the 30,000 range or below consistently.  3/31/2022  platelet count 65,000 and on 4/4/2022 prednisone was tapered down to 10 mg daily  On 4/20/2022, platelet count of 96,000 and prednisone tapered down to 5 mg daily.  Platelet count on 6/15/2022 92,000 with subsequent taper of prednisone to 5 mg every other day  Platelet count did decline into the 40-83500 range on prednisone 5 mg every other day.  Patient hospitalized 7/30 - 8/2/2022 due to COVID-19 infection/pneumonia.  She received dexamethasone alone.  During that time, platelet count was 46,000 on admission 7/30/2022 but increased up to 82,000 the time of discharge on 8/2/2022.    Hospitalized again 8/5- 8/10/2022 due to symptoms of persistent weakness, fatigue, shortness of breath, cough, congestion following her COVID-19 infection. Bilateral lower extremity Doppler showed bilateral acute calf DVT.  Perfusion scan 8/8/2022 showed no evidence of pulmonary embolism.  Platelet count was in the 90-low 100,000 range and she was therefore anticoagulated initially with Lovenox and transitioned to Eliquis.  Transitioned from dexamethasone which she received for COVID-19 infection back to prednisone at 5 mg every other day.    Platelet count subsequently decreased into the 60-64651 range.  On 8/19/2022 prednisone increased to 10 mg daily.    With no improvement in platelet count on 8/22/2022, prednisone dose increased to 15 mg daily.    Patient was admitted yet again 8/24 - 8/29/2022 related to L5 compression fracture. With concern for ongoing prednisone in the setting of compression fracture, discussion regarding use of Promacta 50 mg daily with potential prednisone taper pending response.  Patient subsequently transferred to subacute nursing facility.  Per family, obtained Promacta 50 mg daily and provided this to the nursing facility and she began the medication on 9/10/2022.  Promacta discontinued 9/14/2022 with concern regarding potential increased thrombotic risk and risk of increased LFTs.  Elected to maintain  patient on low-dose prednisone.  Prednisone decreased from 15 down to 10 mg daily 9/14/2022.  Platelet count 98,000.  Patient hospitalized 10/16- 10/20/2022.  On admission platelet count 66,000, on discharge 10/20/2022 platelet count 69,000.  Patient discharged on prednisone 15 mg daily  Subsequently prednisone was decreased to 10 mg on 12/7/2023.  Platelet count has subsequently declined into the 40-83821 range.  Patient with no bleeding complications, prednisone maintained at 10 mg daily with plans for ongoing weekly lab monitoring with home health.  Patient admitted again with back pain 3/7 - 3/14/2023.  Platelet count on 3/7/2023 was 41,000.  Platelet count declined to 38,000 on 3/8/2023 with elevated IPF at 10.9% consistent with her history of chronic ITP.  There was recommendation to pursue epidural injection.  In order to facilitate this, patient received IVIG daily x3 days on 3/9 - 3/11/2023 with a total dose of 85 g.  Platelet count responded, increased up to 132,000 on 3/13/2023 at which time the epidural injection was performed.  On 3/14/2023 prior to discharge, platelet count was 160,000.  Patient was maintained on prednisone 10 mg daily anticipating only transient effect from IVIG with need for ongoing prednisone to maintain her counts in the future.  Patient was discharged to subacute nursing facility on 3/14/2023.  Patient hospitalized 4/4-4/7/23 due to CHF exacerbation and again 4/30 - 5/4/2023 due to E. coli UTI/sepsis as well as acute MI.   Patient had been continuing on prednisone 10 mg daily chronic therapy in an attempt to maintain platelet count in an acceptable range due to her chronic ITP and avoid other more cumbersome therapies given her age and comorbidities.  Attempting to maintain platelet count above the 40-31273 range.    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  She received high-dose IV steroids and eventually received IVIG 1 g/kg (45 g) x2 days beginning 8/4/2023.  We had initially discussed use of rituximab however she had positive hepatitis B core antibody and after evaluation by GI, felt that there was some risk for reactivation of hepatitis B and that she would require antiviral therapy if rituximab was administered.  She had significant response to IVIG and steroids, at the time of discharge on 8/10/2023, platelet count was 87,000 and she was continued at discharge on prednisone 20 mg daily  Patient returns today in follow-up from recent hospitalization continuing on prednisone 20 mg daily.  Platelet count today is 122,000 with IPF that has normalized at 6%.  We are likely still seeing effects from recent IVIG administration.  Unclear whether platelet count will decline significantly once IVIG effects diminish.  We will attempt to decrease prednisone dose from 20 down to 15 mg daily for now.  We will continue weekly monitoring of her counts with home health.  Consider further taper prednisone down to 10 mg daily however would anticipate again trying to maintain her at this dose.  We did discuss available options for further treatment if her count declines below 50,000.  As above, rituximab would require administration of prophylactic antiviral therapy due to risk of hepatitis B reactivation and this does not sound like an attractive option and the patient and her family agree.  We certainly could administer intermittent IVIG therapy in the outpatient setting and they would prefer to pursue this if needed in the future.  We did discuss use of Promacta however there are concerns regarding her underlying liver dysfunction.  We also discussed the use of Nplate however this would require weekly visits which would be difficult given her limitations in mobility.  I will plan to see the patient back in 6  weeks for follow-up in the office.    *Anemia secondary to chronic disease, CKD 3/4 as well as B12 deficiency  Patient with new onset anemia today with hemoglobin of 11.1, MCV normal at 91.9  Patient certainly has evidence of underlying CKD 3/4 which may be a contributing factor  Additional labs on 4/20/2022 with hemoglobin 11.1, iron 31, ferritin 260.2, iron saturation 9%, TIBC 328, folate 16, B12 811.  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  Labs during hospitalization were again consistent with anemia secondary to chronic disease on 8/26/2022 with iron 36, ferritin 241, iron saturation 13%, TIBC 276, folate 6.69, B12 417.  Patient was receiving oral iron however was not iron deficient and was experiencing constipation, oral iron discontinued.    Hemoglobin did decline during hospitalization 3/7 - 3/14/2023 from 13.2 on admission down into the 10-11 range.  Anemia evaluation performed on 3/12/2023 with iron 135, ferritin 295, iron saturation 51%, TIBC 264, folate 5.94, B12 236.  Hemoglobin was 10.5 at discharge.  Hemoglobin on 3/15/2023 was 10.0.  Given the low normal B12 level on 3/12/2023 we will begin oral B12 1000 mcg daily.  Labs otherwise consistent with anemia secondary to chronic disease/CKD3 and will hopefully improve over time.  During hospitalization 7/31 - 8/10/2023, patient developed decline in hemoglobin.  Labs on 8/1/2023 with B12 287, folate 14.5.  Additional anemia evaluation on 8/7/2023 with iron 120, ferritin 340, iron saturation 42%, TIBC 285.  Patient received B12 injections daily x5 beginning 8/2/2023 and subsequently transitioned to oral B12 1000 mcg daily.  Hemoglobin today has improved at 12.2.  Patient continues on oral B12 1000 mcg daily.    *Borderline B12 deficiency  B12 level on 3/4/2022 was 238  Patient initiated B12 1000 mcg IM injection daily x3 days and oral B12  1000 mcg daily  Labs on 4/20/2022 with B12 811  Patient was briefly receiving oral B12 replacement, not currently receiving B12.  B12 level on 8/27/2022 was 417.  B12 during hospitalization 3/12/2023 low normal at 236.  Initiated oral B12 1000 mcg daily.  During hospitalization 7/31 - 8/10/2023, patient developed decline in hemoglobin.  Labs on 8/1/2023 with B12 287.  Patient received B12 injections daily x5 beginning 8/2/2023 and subsequently transitioned to oral B12 1000 mcg daily.     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  Spleen however was normal in size.  LFTs normal on 3/2/2022  CT abdomen and pelvis 3/8/2022 with liver morphology suspicious for chronic liver disease, spleen normal in size at 10.4 cm.  No other abnormalities.  Significance of the liver appearance on scans is unclear.  On 3/23/2022, elevated LFTs with ALT 46, AST 40, normal total bilirubin 0.4.  CT abdomen and pelvis 3/9/2023 with continued evidence of hepatic steatosis and cirrhotic morphology of the liver, no splenomegaly.  LFTs have remained mildly elevated  During hospitalization 7/31 - 8/10/2023, consideration of treatment with rituximab for ITP.  Labs on 8/3/2023 with positive hepatitis B core antibody, positive hepatitis B surface antibody, negative hepatitis B surface antigen.  Patient was seen by GI and additional evaluation performed on 8//23 with negative hepatitis B core IgM antibody, negative hepatitis B PCR, positive hepatitis B E  antibody, negative hepatitis B E antigen, positive hepatitis A antibody, negative hepatitis C antibody.  Per GI, patient would be at risk for reactivation of hepatitis B with use of rituximab and would require concurrent use of prophylactic tenofovir if treatment pursued  LFTs today normal    *GI prophylaxis  Nexium 40 mg daily    *Pulmonary nodule  Patient underwent CT scan at [facility] urology on 9/7/2021 that showed a 4 mm subpleural left lower lobe nodule  Patient was scheduled for follow-up CT chest with thoracic surgery however declined to proceed with the scan.  Given her age and limited ability to treat a malignancy and with a high probability that this is a benign finding, scan was canceled.  CT chest during hospitalization 8/7/2022 with no mention of pulmonary nodule.  CT chest 3/12/2023 with no mention of pulmonary nodule.    *Bilateral calf DVT  Patient was hospitalized 7/30 - 8/2/2022 due to COVID-19 infection/pneumonia.  She received dexamethasone alone.    She was hospitalized again 8/5- 8/10/2022 due to symptoms of persistent weakness, fatigue, shortness of breath, cough, congestion following her COVID-19 infection.  During that hospitalization, she underwent CT chest 8/7/2022 which showed no significant abnormal findings.  Bilateral lower extremity Doppler showed bilateral acute calf DVT.  Perfusion scan 8/8/2022 showed no evidence of pulmonary embolism.  Platelet count was in the 90-low 100,000 range and she was therefore anticoagulated initially with Lovenox and transitioned to Eliquis 5 mg twice daily.   During hospitalization, doppler 10/17/2022 lower extremities showed evolution of her previous bilateral acute calf DVT to subacute on the right and chronic on the left.  Eliquis was held during hospital stay and was not resumed at time of discharge.  Right upper extremity swelling prompted right upper extremity Doppler on 3/13/2023 which was negative during hospitalization.  Patient currently remains  off anticoagulation.  She is quite sedentary and is certainly at risk for progression of thrombosis.  Platelet count has remained in the 40-26586 range.  Anticoagulation would likely confer significant risk for bleeding in this situation.  There has been no clinical evidence of progressive or recurrent thrombosis.    *L5 compression fracture  Patient hospitalized 8/24 - 8/29/2022 related to L5 compression fracture.    CT scans showed compression fracture at L5.    MRI lumbar spine 8/24/2022 redemonstrated L5 compression fracture.    Conservative management of compression fracture was recommended by neurosurgery.  Patient hospitalized with significant pain from L5 compression fracture 10/6 - 10/20/2022. CT scan on 10/16/2022 showed slightly more loss of height in the L5 fracture.  MRI of the lumbar spine on 10/17/2022 showed 20% loss of height at L5 with multiple fracture planes.  She was evaluated by neurosurgery and by pain management.  In light of her thrombocytopenia, it was felt that she was not a candidate for epidural injection.  Hospitalization 3/7 - 3/14/2023.  She developed again worsening difficulty with back pain radiating to her bilateral lower extremities affecting her ability to ambulate.  MRI lumbar spine 3/7/2023 showed slight worsening of L5 compression fracture and further loss of height in the neural foramen at L5/S1 in addition to other degenerative changes producing foraminal narrowing.  Patient received IVIG to improve platelet count and facilitate epidural injection.  Platelet count increased up to 132,000 on 3/13/2023 at which time the epidural injection was performed. Patient was discharged to subacute nursing facility on 3/14/2023.  Pain improved after epidural injection but does persist.  Mobility improved as well.    *Electrolyte disturbances  Patient with ongoing difficulty with hyponatremia, hyperkalemia. We are not managing her electrolytes.    *Diabetes mellitus  Continuing on Lantus  and sliding scale insulin  Diabetes exacerbated by prednisone  Glucose today 308, patient will administer sliding scale insulin when she returns home.    *Left lower abdominal dermatome shingles  During hospitalization 7/31 - 8/10/2023, patient experienced outbreak of shingles in lower left abdominal dermatome treated with Valtrex 1 g daily (renally dosed) x14 days beginning 8/2/2023.  Patient with persistent abdominal pain in the left lower quadrant related to shingles, was seen in emergency department on 8/16/2023, CT abdomen pelvis showed no acute findings.  On exam today, shingles is resolving.  She is experiencing some postherpetic neuralgia.  She had previously been receiving gabapentin 600 mg twice daily in regards to her back pain however this had been decreased at discharge to 300 mg twice daily.  She tolerated the previous dose, we discussed increasing this back to prior dosing    *CODE STATUS  Patient is DNR    Plan:  Decrease prednisone from 20 down to 15 mg daily.  We are attempting to maintain platelet count above the 55179 range.  We will monitor platelet count on a weekly basis with labs to be drawn by [facility] and faxed to our office.  If platelet count remains above 80,000 consider taper down to 10 mg prednisone daily in the future.  If platelet count declines below 50,000, consider further outpatient administration of IVIG.  Continue oral B12 1000 mcg daily.  The patient currently remains off of anticoagulation with Eliquis.  Patient felt to have increased risk for bleeding complications on anticoagulation with platelet count in the current range  MD visit in 6 weeks with CBC, CMP, IPF      I did spend 40 minutes total time caring for the patient today, time spent in review of records, face-to-face consultation, coordination of care, placement of orders, completion of documentation.

## 2023-08-24 ENCOUNTER — LAB (OUTPATIENT)
Dept: LAB | Facility: HOSPITAL | Age: 88
End: 2023-08-24
Payer: MEDICARE

## 2023-08-24 ENCOUNTER — OFFICE VISIT (OUTPATIENT)
Dept: ONCOLOGY | Facility: CLINIC | Age: 88
End: 2023-08-24
Payer: MEDICARE

## 2023-08-24 ENCOUNTER — TELEPHONE (OUTPATIENT)
Dept: ONCOLOGY | Facility: CLINIC | Age: 88
End: 2023-08-24
Payer: MEDICARE

## 2023-08-24 VITALS
DIASTOLIC BLOOD PRESSURE: 49 MMHG | HEIGHT: 56 IN | BODY MASS INDEX: 37.97 KG/M2 | RESPIRATION RATE: 18 BRPM | WEIGHT: 168.8 LBS | TEMPERATURE: 97.7 F | OXYGEN SATURATION: 96 % | SYSTOLIC BLOOD PRESSURE: 197 MMHG | HEART RATE: 58 BPM

## 2023-08-24 DIAGNOSIS — Z86.2 HISTORY OF ITP: ICD-10-CM

## 2023-08-24 DIAGNOSIS — D69.3 CHRONIC ITP (IDIOPATHIC THROMBOCYTOPENIA): Primary | ICD-10-CM

## 2023-08-24 DIAGNOSIS — D69.6 THROMBOCYTOPENIA: ICD-10-CM

## 2023-08-24 LAB
ALBUMIN SERPL-MCNC: 3.8 G/DL (ref 3.5–5.2)
ALBUMIN/GLOB SERPL: 1.3 G/DL
ALP SERPL-CCNC: 41 U/L (ref 39–117)
ALT SERPL W P-5'-P-CCNC: 23 U/L (ref 1–33)
ANION GAP SERPL CALCULATED.3IONS-SCNC: 16.5 MMOL/L (ref 5–15)
AST SERPL-CCNC: 24 U/L (ref 1–32)
BASOPHILS # BLD AUTO: 0.01 10*3/MM3 (ref 0–0.2)
BASOPHILS NFR BLD AUTO: 0.1 % (ref 0–1.5)
BILIRUB SERPL-MCNC: 0.5 MG/DL (ref 0–1.2)
BUN SERPL-MCNC: 41 MG/DL (ref 8–23)
BUN/CREAT SERPL: 28.7 (ref 7–25)
CALCIUM SPEC-SCNC: 9 MG/DL (ref 8.2–9.6)
CHLORIDE SERPL-SCNC: 94 MMOL/L (ref 98–107)
CO2 SERPL-SCNC: 23.5 MMOL/L (ref 22–29)
CREAT SERPL-MCNC: 1.43 MG/DL (ref 0.6–1.1)
DEPRECATED RDW RBC AUTO: 76 FL (ref 37–54)
EGFRCR SERPLBLD CKD-EPI 2021: 34.5 ML/MIN/1.73
EOSINOPHIL # BLD AUTO: 0.03 10*3/MM3 (ref 0–0.4)
EOSINOPHIL NFR BLD AUTO: 0.4 % (ref 0.3–6.2)
ERYTHROCYTE [DISTWIDTH] IN BLOOD BY AUTOMATED COUNT: 22.2 % (ref 12.3–15.4)
GLOBULIN UR ELPH-MCNC: 2.9 GM/DL
GLUCOSE SERPL-MCNC: 308 MG/DL (ref 65–99)
HCT VFR BLD AUTO: 38.2 % (ref 34–46.6)
HGB BLD-MCNC: 12.2 G/DL (ref 12–15.9)
IMM GRANULOCYTES # BLD AUTO: 0.03 10*3/MM3 (ref 0–0.05)
IMM GRANULOCYTES NFR BLD AUTO: 0.4 % (ref 0–0.5)
LYMPHOCYTES # BLD AUTO: 1.52 10*3/MM3 (ref 0.7–3.1)
LYMPHOCYTES NFR BLD AUTO: 18.7 % (ref 19.6–45.3)
MCH RBC QN AUTO: 30.3 PG (ref 26.6–33)
MCHC RBC AUTO-ENTMCNC: 31.9 G/DL (ref 31.5–35.7)
MCV RBC AUTO: 94.8 FL (ref 79–97)
MONOCYTES # BLD AUTO: 0.43 10*3/MM3 (ref 0.1–0.9)
MONOCYTES NFR BLD AUTO: 5.3 % (ref 5–12)
NEUTROPHILS NFR BLD AUTO: 6.09 10*3/MM3 (ref 1.7–7)
NEUTROPHILS NFR BLD AUTO: 75.1 % (ref 42.7–76)
NRBC BLD AUTO-RTO: 0 /100 WBC (ref 0–0.2)
PLATELET # BLD AUTO: 122 10*3/MM3 (ref 140–450)
PLATELETS.RETICULATED NFR BLD AUTO: 6 % (ref 0.9–6.5)
PMV BLD AUTO: 10.2 FL (ref 6–12)
POTASSIUM SERPL-SCNC: 4 MMOL/L (ref 3.5–5.2)
PROT SERPL-MCNC: 6.7 G/DL (ref 6–8.5)
RBC # BLD AUTO: 4.03 10*6/MM3 (ref 3.77–5.28)
SODIUM SERPL-SCNC: 134 MMOL/L (ref 136–145)
WBC NRBC COR # BLD: 8.11 10*3/MM3 (ref 3.4–10.8)

## 2023-08-24 PROCEDURE — 36415 COLL VENOUS BLD VENIPUNCTURE: CPT

## 2023-08-24 PROCEDURE — 85025 COMPLETE CBC W/AUTO DIFF WBC: CPT

## 2023-08-24 PROCEDURE — 85055 RETICULATED PLATELET ASSAY: CPT

## 2023-08-24 PROCEDURE — 80053 COMPREHEN METABOLIC PANEL: CPT

## 2023-08-24 NOTE — LETTER
August 24, 2023     Mariah Hickman MD  [address]  [facility] [address]    Patient: Helena Carmen   YOB: 1931   Date of Visit: 8/24/2023     Dear Mariah Hickman MD:       Thank you for referring Helena Carmen to me for evaluation. Below are the relevant portions of my assessment and plan of care.    If you have questions, please do not hesitate to call me. I look forward to following Helena along with you.         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  She received high-dose IV steroids and eventually received IVIG 1 g/kg (45 g) x2 days beginning 8/4/2023.  We had initially discussed use of rituximab however she had positive hepatitis B core antibody and after evaluation by GI, felt that there was some risk for reactivation of hepatitis B and that she would require antiviral therapy if rituximab was administered.  She had significant response to IVIG and steroids, at the time of discharge on 8/10/2023, platelet count was 87,000 and she was continued at discharge on prednisone 20 mg daily.  During her hospital stay she also developed shingles and was treated with Valtrex x14 days completed on 8/16/2023.  She has had some difficulty with postherpetic neuralgia, had left abdominal pain and was seen in the emergency department on 8/16/2023, CT showed no acute process and  "ultimately her pain was attributed to postherpetic neuralgia.  She is on gabapentin, previously had been on 600 mg twice daily however dose in the hospital was decreased to 300 mg twice daily for unclear reasons.  She does have diabetes that has been exacerbated by prednisone and does continue on sliding scale insulin in addition to Lantus.  Patient is accompanied today by her daughter.  She does have home health following her with ability to draw weekly labs.  She has not experienced any bleeding issues, does have difficulty with easy bruising.  She is in a wheelchair currently but is able to ambulate at home with the use of a walker.      Objective   Vital Signs:   BP (!) 197/49 Comment: Pt took BP meds an hour ago  Pulse 58   Temp 97.7 øF (36.5 øC) (Temporal)   Resp 18   Ht 142.2 cm (55.98\")   Wt 76.6 kg (168 lb 12.8 oz)   SpO2 96%   BMI 37.87 kg/mý     Physical Exam  Constitutional:       Appearance: She is well-developed.      Comments: Elderly female no distress seated in a wheelchair   Eyes:      Conjunctiva/sclera: Conjunctivae normal.   Neck:      Thyroid: No thyromegaly.   Cardiovascular:      Rate and Rhythm: Normal rate and regular rhythm.      Heart sounds: Murmur heard.     No friction rub. No gallop.   Pulmonary:      Effort: No respiratory distress.      Breath sounds: Normal breath sounds.   Abdominal:      General: Bowel sounds are normal. There is no distension.      Palpations: Abdomen is soft.      Tenderness: There is no abdominal tenderness.   Musculoskeletal:         General: Swelling present.      Comments: Trace bilateral lower extremity edema   Lymphadenopathy:      Head:      Right side of head: No submandibular adenopathy.      Cervical: No cervical adenopathy.      Upper Body:      Right upper body: No supraclavicular adenopathy.      Left upper body: No supraclavicular adenopathy.   Skin:     General: Skin is warm and dry.      Findings: No rash.      Comments: Scattered " ecchymoses predominantly on the forearms bilaterally.  Shingles rash in the lower left abdominal wall is resolving   Neurological:      Mental Status: She is alert and oriented to person, place, and time.      Cranial Nerves: No cranial nerve deficit.      Motor: No abnormal muscle tone.      Deep Tendon Reflexes: Reflexes normal.   Psychiatric:         Behavior: Behavior normal.          Result Review : Reviewed CBC, CMP, IPF from today.       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  Additional labs 3/3/2022 with positive BECCA at 1: 320 dilution with homogeneous pattern, negative BECCA panel  On 3/4/2022, B12 low normal at 238, folate greater than 20, negative serum protein electrophoresis and immunoelectrophoresis with free kappa light chain 65.6, free lambda light chain 37.1 and free light chain ratio 1.77 (appropriate for degree of renal dysfunction), LDH borderline elevated 238  CT abdomen and pelvis 3/8/2022 with liver morphology suspicious for chronic liver disease, spleen normal in size at 10.4 cm.  No other abnormalities.  Concern for possible ITP, initiated steroids on 3/3/2022 with prednisone 30 mg daily  Platelet count improved, up to 90,000 on 3/6/2022, prednisone tapered to 20 mg daily.    Unclear whether  improvement in thrombocytopenia was related to steroids, B12 replacement, or improvement in CHF/volume overload.  Recommended decreasing  prednisone dose down to 15 mg daily prior to transition to subacute nursing facility on 3/9/2022.  Patient however was discharged on prednisone 20 mg daily.  Requested that nursing facility forward labs weekly monitor platelet count and further gradually taper prednisone dose however this did not occur.  On 3/23/2022, platelet count was 64,000.  Tapered prednisone from 20 down to 15 mg daily.  Intend to maintain platelet count above the 50-95989 range.  Consider additional treatment options with IVIG or rituximab if platelet count declines into the 30,000 range or below consistently.  3/31/2022 platelet count 65,000 and on 4/4/2022 prednisone was tapered down to 10 mg daily  On 4/20/2022, platelet count of 96,000 and prednisone tapered down to 5 mg daily.  Platelet count on 6/15/2022 92,000 with subsequent taper of prednisone to 5 mg every other day  Platelet count did decline into the 40-03479 range on prednisone 5 mg every other day.  Patient hospitalized 7/30 - 8/2/2022 due to COVID-19 infection/pneumonia.  She received dexamethasone alone.  During that time, platelet count was 46,000 on admission 7/30/2022 but increased up to 82,000 the time of discharge on 8/2/2022.    Hospitalized again 8/5- 8/10/2022 due to symptoms of persistent weakness, fatigue, shortness of breath, cough, congestion following her COVID-19 infection. Bilateral lower extremity Doppler showed bilateral acute calf DVT.  Perfusion scan 8/8/2022 showed no evidence of pulmonary embolism.  Platelet count was in the 90-low 100,000 range and she was therefore anticoagulated initially with Lovenox and transitioned to Eliquis.  Transitioned from dexamethasone which she received for COVID-19 infection back to prednisone at 5 mg every other day.    Platelet count subsequently decreased into the 60-87275 range.  On 8/19/2022 prednisone increased to 10 mg daily.    With no improvement in platelet count on 8/22/2022, prednisone dose increased to 15 mg  daily.    Patient was admitted yet again 8/24 - 8/29/2022 related to L5 compression fracture. With concern for ongoing prednisone in the setting of compression fracture, discussion regarding use of Promacta 50 mg daily with potential prednisone taper pending response.  Patient subsequently transferred to subacute nursing facility.  Per family, obtained Promacta 50 mg daily and provided this to the nursing facility and she began the medication on 9/10/2022.  Promacta discontinued 9/14/2022 with concern regarding potential increased thrombotic risk and risk of increased LFTs.  Elected to maintain patient on low-dose prednisone.  Prednisone decreased from 15 down to 10 mg daily 9/14/2022.  Platelet count 98,000.  Patient hospitalized 10/16- 10/20/2022.  On admission platelet count 66,000, on discharge 10/20/2022 platelet count 69,000.  Patient discharged on prednisone 15 mg daily  Subsequently prednisone was decreased to 10 mg on 12/7/2023.  Platelet count has subsequently declined into the 40-40572 range.  Patient with no bleeding complications, prednisone maintained at 10 mg daily with plans for ongoing weekly lab monitoring with home health.  Patient admitted again with back pain 3/7 - 3/14/2023.  Platelet count on 3/7/2023 was 41,000.  Platelet count declined to 38,000 on 3/8/2023 with elevated IPF at 10.9% consistent with her history of chronic ITP.  There was recommendation to pursue epidural injection.  In order to facilitate this, patient received IVIG daily x3 days on 3/9 - 3/11/2023 with a total dose of 85 g.  Platelet count responded, increased up to 132,000 on 3/13/2023 at which time the epidural injection was performed.  On 3/14/2023 prior to discharge, platelet count was 160,000.  Patient was maintained on prednisone 10 mg daily anticipating only transient effect from IVIG with need for ongoing prednisone to maintain her counts in the future.  Patient was discharged to subacute nursing facility on  3/14/2023.  Patient hospitalized 4/4-4/7/23 due to CHF exacerbation and again 4/30 - 5/4/2023 due to E. coli UTI/sepsis as well as acute MI.   Patient had been continuing on prednisone 10 mg daily chronic therapy in an attempt to maintain platelet count in an acceptable range due to her chronic ITP and avoid other more cumbersome therapies given her age and comorbidities.  Attempting to maintain platelet count above the 40-72652 range.    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  She received high-dose IV steroids and eventually received IVIG 1 g/kg (45 g) x2 days beginning 8/4/2023.  We had initially discussed use of rituximab however she had positive hepatitis B core antibody and after evaluation by GI, felt that there was some risk for reactivation of hepatitis B and that she would require antiviral therapy if rituximab was administered.  She had significant response to IVIG and steroids, at the time of discharge on 8/10/2023, platelet count was 87,000 and she was continued at discharge on prednisone 20 mg daily  Patient returns today in follow-up from recent hospitalization continuing on prednisone 20 mg daily.  Platelet count today is 122,000 with IPF that has normalized at 6%.  We are likely still seeing effects from recent IVIG administration.  Unclear whether platelet count will decline significantly once IVIG effects diminish.  We will attempt to decrease prednisone dose from 20 down to 15 mg daily for now.  We will continue weekly monitoring of her counts with home health.  Consider further taper prednisone down to 10 mg daily however would anticipate again trying to maintain her at this dose.  We did discuss available options for further treatment if her count declines below 50,000.  As above, rituximab would  require administration of prophylactic antiviral therapy due to risk of hepatitis B reactivation and this does not sound like an attractive option and the patient and her family agree.  We certainly could administer intermittent IVIG therapy in the outpatient setting and they would prefer to pursue this if needed in the future.  We did discuss use of Promacta however there are concerns regarding her underlying liver dysfunction.  We also discussed the use of Nplate however this would require weekly visits which would be difficult given her limitations in mobility.  I will plan to see the patient back in 6 weeks for follow-up in the office.    *Anemia secondary to chronic disease, CKD 3/4 as well as B12 deficiency  Patient with new onset anemia today with hemoglobin of 11.1, MCV normal at 91.9  Patient certainly has evidence of underlying CKD 3/4 which may be a contributing factor  Additional labs on 4/20/2022 with hemoglobin 11.1, iron 31, ferritin 260.2, iron saturation 9%, TIBC 328, folate 16, B12 811.  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  Labs during hospitalization were again consistent with anemia secondary to chronic disease on 8/26/2022 with iron 36, ferritin 241, iron saturation 13%, TIBC 276, folate 6.69, B12 417.  Patient was receiving oral iron however was not iron deficient and was experiencing constipation, oral iron discontinued.    Hemoglobin did decline during hospitalization 3/7 - 3/14/2023 from 13.2 on admission down into the 10-11 range.  Anemia evaluation performed on 3/12/2023 with iron 135, ferritin 295, iron saturation 51%, TIBC 264, folate 5.94, B12 236.  Hemoglobin was 10.5 at discharge.  Hemoglobin on 3/15/2023 was 10.0.  Given the low normal B12 level on 3/12/2023 we will begin oral B12 1000 mcg daily.  Labs otherwise consistent with anemia secondary to chronic  disease/CKD3 and will hopefully improve over time.  During hospitalization 7/31 - 8/10/2023, patient developed decline in hemoglobin.  Labs on 8/1/2023 with B12 287, folate 14.5.  Additional anemia evaluation on 8/7/2023 with iron 120, ferritin 340, iron saturation 42%, TIBC 285.  Patient received B12 injections daily x5 beginning 8/2/2023 and subsequently transitioned to oral B12 1000 mcg daily.  Hemoglobin today has improved at 12.2.  Patient continues on oral B12 1000 mcg daily.    *Borderline B12 deficiency  B12 level on 3/4/2022 was 238  Patient initiated B12 1000 mcg IM injection daily x3 days and oral B12 1000 mcg daily  Labs on 4/20/2022 with B12 811  Patient was briefly receiving oral B12 replacement, not currently receiving B12.  B12 level on 8/27/2022 was 417.  B12 during hospitalization 3/12/2023 low normal at 236.  Initiated oral B12 1000 mcg daily.  During hospitalization 7/31 - 8/10/2023, patient developed decline in hemoglobin.  Labs on 8/1/2023 with B12 287.  Patient received B12 injections daily x5 beginning 8/2/2023 and subsequently transitioned to oral B12 1000 mcg daily.     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  Spleen however was normal in size.  LFTs normal on 3/2/2022  CT abdomen and pelvis 3/8/2022 with liver morphology suspicious for chronic liver disease, spleen normal in size at 10.4 cm.  No other abnormalities.  Significance of the liver appearance on scans is unclear.  On 3/23/2022,  elevated LFTs with ALT 46, AST 40, normal total bilirubin 0.4.  CT abdomen and pelvis 3/9/2023 with continued evidence of hepatic steatosis and cirrhotic morphology of the liver, no splenomegaly.  LFTs have remained mildly elevated  During hospitalization 7/31 - 8/10/2023, consideration of treatment with rituximab for ITP.  Labs on 8/3/2023 with positive hepatitis B core antibody, positive hepatitis B surface antibody, negative hepatitis B surface antigen.  Patient was seen by GI and additional evaluation performed on 8//23 with negative hepatitis B core IgM antibody, negative hepatitis B PCR, positive hepatitis B E antibody, negative hepatitis B E antigen, positive hepatitis A antibody, negative hepatitis C antibody.  Per GI, patient would be at risk for reactivation of hepatitis B with use of rituximab and would require concurrent use of prophylactic tenofovir if treatment pursued  LFTs today normal    *GI prophylaxis  Nexium 40 mg daily    *Pulmonary nodule  Patient underwent CT scan at [facility] on 9/7/2021 that showed a 4 mm subpleural left lower lobe nodule  Patient was scheduled for follow-up CT chest with thoracic surgery however declined to proceed with the scan.  Given her age and limited ability to treat a malignancy and with a high probability that this is a benign finding, scan was canceled.  CT chest during hospitalization 8/7/2022 with no mention of pulmonary nodule.  CT chest 3/12/2023 with no mention of pulmonary nodule.    *Bilateral calf DVT  Patient was hospitalized 7/30 - 8/2/2022 due to COVID-19 infection/pneumonia.  She received dexamethasone alone.    She was hospitalized again 8/5- 8/10/2022 due to symptoms of persistent weakness, fatigue, shortness of breath, cough, congestion following her COVID-19 infection.  During that hospitalization, she underwent CT chest 8/7/2022 which showed no significant abnormal findings.  Bilateral lower extremity Doppler showed bilateral acute calf DVT.   Perfusion scan 8/8/2022 showed no evidence of pulmonary embolism.  Platelet count was in the 90-low 100,000 range and she was therefore anticoagulated initially with Lovenox and transitioned to Eliquis 5 mg twice daily.   During hospitalization, doppler 10/17/2022 lower extremities showed evolution of her previous bilateral acute calf DVT to subacute on the right and chronic on the left.  Eliquis was held during hospital stay and was not resumed at time of discharge.  Right upper extremity swelling prompted right upper extremity Doppler on 3/13/2023 which was negative during hospitalization.  Patient currently remains off anticoagulation.  She is quite sedentary and is certainly at risk for progression of thrombosis.  Platelet count has remained in the 40-46201 range.  Anticoagulation would likely confer significant risk for bleeding in this situation.  There has been no clinical evidence of progressive or recurrent thrombosis.    *L5 compression fracture  Patient hospitalized 8/24 - 8/29/2022 related to L5 compression fracture.    CT scans showed compression fracture at L5.    MRI lumbar spine 8/24/2022 redemonstrated L5 compression fracture.    Conservative management of compression fracture was recommended by neurosurgery.  Patient hospitalized with significant pain from L5 compression fracture 10/6 - 10/20/2022. CT scan on 10/16/2022 showed slightly more loss of height in the L5 fracture.  MRI of the lumbar spine on 10/17/2022 showed 20% loss of height at L5 with multiple fracture planes.  She was evaluated by neurosurgery and by pain management.  In light of her thrombocytopenia, it was felt that she was not a candidate for epidural injection.  Hospitalization 3/7 - 3/14/2023.  She developed again worsening difficulty with back pain radiating to her bilateral lower extremities affecting her ability to ambulate.  MRI lumbar spine 3/7/2023 showed slight worsening of L5 compression fracture and further loss of  height in the neural foramen at L5/S1 in addition to other degenerative changes producing foraminal narrowing.  Patient received IVIG to improve platelet count and facilitate epidural injection.  Platelet count increased up to 132,000 on 3/13/2023 at which time the epidural injection was performed. Patient was discharged to subacute nursing facility on 3/14/2023.  Pain improved after epidural injection but does persist.  Mobility improved as well.    *Electrolyte disturbances  Patient with ongoing difficulty with hyponatremia, hyperkalemia. We are not managing her electrolytes.    *Diabetes mellitus  Continuing on Lantus and sliding scale insulin  Diabetes exacerbated by prednisone  Glucose today 308, patient will administer sliding scale insulin when she returns home.    *Left lower abdominal dermatome shingles  During hospitalization 7/31 - 8/10/2023, patient experienced outbreak of shingles in lower left abdominal dermatome treated with Valtrex 1 g daily (renally dosed) x14 days beginning 8/2/2023.  Patient with persistent abdominal pain in the left lower quadrant related to shingles, was seen in emergency department on 8/16/2023, CT abdomen pelvis showed no acute findings.  On exam today, shingles is resolving.  She is experiencing some postherpetic neuralgia.  She had previously been receiving gabapentin 600 mg twice daily in regards to her back pain however this had been decreased at discharge to 300 mg twice daily.  She tolerated the previous dose, we discussed increasing this back to prior dosing    *CODE STATUS  Patient is DNR    Plan:  Decrease prednisone from 20 down to 15 mg daily.  We are attempting to maintain platelet count above the 77044 range.  We will monitor platelet count on a weekly basis with labs to be drawn by [facility] and faxed to our office.  If platelet count remains above 80,000 consider taper down to 10 mg prednisone daily in the future.  If platelet count declines below 50,000,  consider further outpatient administration of IVIG.  Continue oral B12 1000 mcg daily.  The patient currently remains off of anticoagulation with Eliquis.  Patient felt to have increased risk for bleeding complications on anticoagulation with platelet count in the current range  MD visit in 6 weeks with CBC, CMP, IPF      I did spend 40 minutes total time caring for the patient today, time spent in review of records, face-to-face consultation, coordination of care, placement of orders, completion of documentation.

## 2023-08-24 NOTE — TELEPHONE ENCOUNTER
Provider: Lane  Caller: Elizabeth  Relationship to Patient: nurse  Call Back Phone Number: [phone]  Reason for Call: Wants to know if she needs to continue weekly CBC's since discharge.

## 2023-08-24 NOTE — TELEPHONE ENCOUNTER
Called Elizabeth back to let her know that the patient would have labs and be seen by Dr. Sherman today and that we would have a better plan after that. Elizabeth payton/jessica.

## 2023-08-28 NOTE — OUTREACH NOTE
Medical Week 3 Survey      Flowsheet Row Responses   [facility] patient discharged from? [place]   Does the patient have one of the following disease processes/diagnoses(primary or secondary)? Other   Week 3 attempt successful? Yes   Call start time 1821   Call end time 1828   Discharge diagnosis Thrombocytopenia   Person spoke with today (if not patient) and relationship Radha, daughter   Is the patient taking all medications as directed (includes completed medication regime)? Yes   Medication comments States they had increase her gabapentin to 600mg due to shingles pain but states that it made it worse and then had to reduce dose back to original dose of 300mg   Does the patient have a primary care provider?  Yes   Has the patient kept scheduled appointments due by today? Yes   Psychosocial issues? No   What is the patient's perception of their health status since discharge? New symptoms unrelated to diagnosis   Is the patient/caregiver able to teach back signs and symptoms related to disease process for when to call PCP? Yes   Is the patient/caregiver able to teach back signs and symptoms related to disease process for when to call 911? Yes   Is the patient/caregiver able to teach back the hierarchy of who to call/visit for symptoms/problems? PCP, Specialist, Home health nurse, Urgent Care, ED, 911 Yes   If the patient is a current smoker, are they able to teach back resources for cessation? Not a smoker   Additional teach back comments States currently they are having issues with pain control with shingles.  She is also having issue with her bowels they are giving stool softners.  Advised to contact PCP regarding pain not controlled. She still has a lot of weakness.   Week 3 Call Completed? Yes   Is the patient interested in additional calls from an ambulatory ? Yes   What is the reason the patient needs support from an ACM? Care Coordination, Preventative Care, High Risk For ED/Readmission   [States she is having uncontrolled pain and bowel issues.  Was advised to contact PCP.  Needs further follow up due to high readmission score]   Graduated/Revoked comments Ambulatory Case Management to follow   Call end time 2863            Deanna NIELSON - Licensed Nurse

## 2023-08-31 NOTE — ED PROVIDER NOTES
EMERGENCY DEPARTMENT ENCOUNTER  I wore full protective equipment throughout this patient encounter including a N95 mask, eye shield, gown and gloves. Hand hygiene was performed before donning protective equipment and after removal when leaving the room.    Room Number:  33/33  Date of encounter:  8/31/2023  PCP: Mariah Hickman MD  Patient Care Team:  Mariah Hickman MD as PCP - General (Family Medicine)  Beth Butcher MD as Consulting Physician (Cardiology)  Rolando Wick MD as Consulting Physician (Nephrology)  Pablo Sherman Jr., MD as Consulting Physician (Hematology and Oncology)  Mango Arnold MD as Referring Physician (Internal Medicine)  Dayana Lovett RN as Ambulatory  (Population Health)     HPI:  Context: Helena Carmen is a 89 y.o. female who presents to the ED c/o chief complaint of pain.  History supplied by patient and patient's family member.  Patient complains of chronic back pain, reports has been there for 2 years, reports that she has a history of lumbar fracture that is nonoperative.  Patient denies any change in this.  Patient is also complaining of left-sided abdominal pain, unable to characterize, reports pain has been constant for 3 weeks.  Patient reports that she had the pain with her shingles, shingles has still resolved, continues to have pain.  Patient endorses nausea, denies any emesis.  Patient has had intermittent constipation and diarrhea, reports recent diarrhea, 2 episodes today, no blood or mucus.  Patient reports that she has a chronic Hoffman catheter secondary to urinary incontinence, reports has been working normally.    MEDICAL HISTORY REVIEW  Reviewed in EPIC    PAST MEDICAL HISTORY  Active Ambulatory Problems     Diagnosis Date Noted    Aortic valve stenosis 04/05/2016    Fatigue 04/05/2016    MI (mitral incompetence) 04/05/2016    PVC (premature ventricular contraction) 04/05/2016    Legally blind 05/25/2018    Essential hypertension  05/25/2018    Hypertriglyceridemia 05/31/2018    Pulmonary hypertension 06/25/2020    Paroxysmal atrial fibrillation 06/25/2020    Anxiety 07/10/2014    Stage 4 chronic kidney disease 03/20/2015    Chronic pain disorder 01/26/2022    Degeneration of lumbar intervertebral disc 09/09/2014    Type 2 diabetes mellitus with hyperglycemia 01/26/2022    Esophageal reflux 07/10/2014    Gastroparesis 01/26/2022    Hiatal hernia 01/26/2022    Iatrogenic hypothyroidism 07/08/2014    Macular degeneration 01/26/2022    Malignant neoplasm of uterus 01/26/2022    Osteoarthritis of knee 07/10/2014    Peripheral nerve disease 07/10/2014    Secondary hyperparathyroidism 04/05/2016    Thrombocytopenia 07/11/2014    Chronic heart failure with preserved ejection fraction 03/02/2022    Other cirrhosis of liver 03/03/2022    Hypothyroidism 02/22/2022    Nonrheumatic tricuspid valve regurgitation 02/22/2022    Anemia, chronic disease 07/30/2022    Hyponatremia 07/30/2022    Closed fracture of fifth lumbar vertebra 08/24/2022    Closed fracture of fifth lumbar vertebra, unspecified fracture morphology, initial encounter 08/25/2022    Diabetic polyneuropathy associated with type 2 diabetes mellitus 08/26/2022    Chronic ITP (idiopathic thrombocytopenia) 08/28/2022    Chronic midline low back pain with bilateral sciatica 10/16/2022    Acute deep vein thrombosis of calf, bilateral 10/17/2022    Compression fracture of L5 vertebra with routine healing 10/17/2022    Acute left-sided low back pain with left-sided sciatica 03/07/2023    Urine retention 03/10/2023    Rib fracture 03/14/2023    Acute on chronic combined systolic and diastolic congestive heart failure 04/04/2023    Acute UTI 04/30/2023    Blindness right eye category 3, blindness left eye category 3 03/09/2022    Cognitive communication deficit 03/09/2022    Pseudomonas (aeruginosa) (mallei) (pseudomallei) as the cause of diseases classified elsewhere 06/09/2022    Multifocal  pneumonia 06/15/2023     Resolved Ambulatory Problems     Diagnosis Date Noted    Diastolic dysfunction 04/05/2016    Hypertensive pulmonary vascular disease (HCC) 04/05/2016    Bioprosthetic aortic valve replacement 05/10/2017    Breast screening 07/08/2014    Abdominal pain, right lower quadrant 12/13/2020    Allergic rhinitis 07/10/2014    Angina pectoris 07/10/2014    Pancreatitis 01/26/2022    Colitis due to Clostridioides difficile 01/26/2022    Hypothyroidism 01/26/2022    Long term (current) use of opiate analgesic 01/26/2022    Uncontrolled type 2 diabetes mellitus 07/08/2014    Benign essential hypertension 07/08/2014    Hypertension 01/26/2022    Pulmonary hypertension 04/05/2016    Hypercholesterolemia 01/26/2022    Hyperlipidemia 07/08/2014    Legal blindness 05/25/2018    Mitral valve disorder 07/10/2014    Mitral valve regurgitation 04/05/2016    Ventricular premature beats 04/05/2016    History of aortic valve replacement 05/10/2017    Nonrheumatic tricuspid valve regurgitation     Elevated serum creatinine     CHF (congestive heart failure) 03/02/2022    Hypertensive disorder 02/22/2022    Pancreatitis 02/22/2022    Sepsis 06/05/2022    COVID-19 07/30/2022    Weakness generalized 07/30/2022    Leukopenia 08/02/2022    Cytokine release syndrome, grade 1 08/02/2022    Urine retention 08/02/2022    COVID-19 virus infection 08/06/2022    Acute DVT (deep venous thrombosis) 08/07/2022    Acute diastolic congestive heart failure 12/20/2022    Esophageal dysphagia 12/24/2022    Stage 3b chronic kidney disease 03/20/2015    Acute on chronic diastolic (congestive) heart failure 03/09/2022     Past Medical History:   Diagnosis Date    Back pain     CKD (chronic kidney disease)     Diverticulosis     Exertional shortness of breath     Heart disease     Hyperthyroidism     L5 compression fracture (HCC) 08/2022    Left ventricular hypertrophy     Liver disease     Low back pain     Mitral regurgitation      Osteoarthritis of hip     Osteoporosis     Peripheral neuropathy     Premature ventricular contractions     Renal insufficiency syndrome     Type 2 diabetes mellitus     Uterine cancer        PAST SURGICAL HISTORY  Past Surgical History:   Procedure Laterality Date    AORTIC VALVE REPAIR/REPLACEMENT      APPENDECTOMY      CATARACT EXTRACTION      ,     CHOLECYSTECTOMY      COLONOSCOPY      ENDOSCOPY  08/15/2014    no gross lesions in stomach/duodenum, erythrematous mucosa in stomach    EPIDURAL BLOCK      HYSTERECTOMY  2007    STERNOTOMY      UPPER GASTROINTESTINAL ENDOSCOPY         FAMILY HISTORY  Family History   Problem Relation Age of Onset    Heart disease Mother     Hypertension Mother     Stroke Mother     Diabetes Mother         mellitus    Other Other         cardiovascular disorder       SOCIAL HISTORY  Social History     Socioeconomic History    Marital status:    Tobacco Use    Smoking status: Former     Packs/day: 0.50     Types: Cigarettes     Quit date: 7/10/1969     Years since quittin.1    Smokeless tobacco: Never   Vaping Use    Vaping Use: Never used   Substance and Sexual Activity    Alcohol use: No     Comment: caffeine use - coffee 2 cups daily    Drug use: No    Sexual activity: Defer       ALLERGIES  Baclofen, Erythromycin, Statins, Cephalexin, Penicillins, and Sulfa antibiotics    The patient's allergies have been reviewed    REVIEW OF SYSTEMS  All systems reviewed and negative except for those discussed in HPI.     PHYSICAL EXAM  I have reviewed the triage vital signs and nursing notes.  ED Triage Vitals [23 1203]   Temp Heart Rate Resp BP SpO2   97.6 °F (36.4 °C) 90 -- -- 100 %      Temp src Heart Rate Source Patient Position BP Location FiO2 (%)   -- -- -- -- --       General: No acute distress.  HENT: NCAT, PERRL, Nares patent.  Eyes: no scleral icterus.  Neck: trachea midline, no ROM limitations.  CV: regular rhythm, regular rate.  Respiratory: normal effort,  CTAB.  Abdomen: soft, nondistended, mild left lower quadrant tenderness to palpation., no rebound tenderness, no guarding or rigidity.  Musculoskeletal: no deformity.  Neuro: alert, moves all extremities, follows commands.  Skin: warm, dry.    LAB RESULTS  Recent Results (from the past 24 hour(s))   ECG 12 Lead Rhythm Change    Collection Time: 08/31/23  1:26 PM   Result Value Ref Range    QT Interval 580 ms    QTC Interval 627 ms       I ordered the above labs and reviewed the results.    RADIOLOGY  XR Chest 1 View    Result Date: 8/31/2023  Clinical: Intubation  COMPARISON examination 8/16/2023  FINDINGS: The endotracheal tube tip is located approximately 7 mm above the aissatou. The cardiomediastinal silhouette is stable. Bilateral pulmonary infiltrates, greater on the right than the left. Pneumonia versus asymmetric vascular congestion. No pleural effusion. Monitoring leads and pads in position. The remainder is unremarkable.  FINDINGS of this report called to Dr. Roldan at the time of completion 2:30 p.m.  This report was finalized on 8/31/2023 2:31 PM by Dr. Art Fabian M.D.       I ordered the above noted radiological studies. I reviewed the images and results. I agree with the radiologist interpretation.    PROCEDURES  Intubation    Date/Time: 8/31/2023 1:38 PM  Performed by: Matt Roldan MD  Authorized by: Matt Roldan MD     Consent:     Consent obtained:  Emergent situation  Universal protocol:     Relevant documents present and verified: yes      Test results available: yes      Imaging studies available: yes      Required blood products, implants, devices, and special equipment available: yes      Site/side marked: yes      Immediately prior to procedure, a time out was called: yes      Patient identity confirmed:  Provided demographic data, hospital-assigned identification number and arm band  Pre-procedure details:     Indication: failure to oxygenate      Patient status:  Unresponsive     Pharmacologic strategy: DSI      Induction agents:  Etomidate    Paralytics:  Succinylcholine  Procedure details:     Preoxygenation:  Bag valve mask    Intubation method:  Oral    Laryngoscope blade:  Mac 3    Tube size (mm):  7.5    Tube type:  Cuffed    Number of attempts:  1    Tube visualized through cords: yes    Placement assessment:     ETT at teeth/gumline (cm):  23    Tube secured with:  ETT wong    Breath sounds:  Equal and absent over the epigastrium    Placement verification: chest rise, direct visualization and equal breath sounds    Post-procedure details:     Procedure completion:  Tolerated well, no immediate complications    MEDICATIONS GIVEN IN ER  Medications   sodium chloride 0.9 % bolus 500 mL (500 mL Intravenous New Bag 8/31/23 1248)   HYDROmorphone (DILAUDID) injection 0.25 mg (has no administration in time range)   EPINEPHrine 5 mg in 250 mL NS infusion (0.3 mcg/kg/min × 83.9 kg Intravenous New Bag 8/31/23 1323)   sennosides-docusate (PERICOLACE) 8.6-50 MG per tablet 2 tablet (has no administration in time range)     And   polyethylene glycol (MIRALAX) packet 17 g (has no administration in time range)     And   bisacodyl (DULCOLAX) EC tablet 5 mg (has no administration in time range)     And   bisacodyl (DULCOLAX) suppository 10 mg (has no administration in time range)   fentaNYL (SUBLIMAZE) bolus from bag 10 mcg/mL 50 mcg (has no administration in time range)   fentaNYL 1000 mcg in 100 mL NS infusion (has no administration in time range)   midazolam (VERSED) bolus from bag 1 mg/mL 1 mg (has no administration in time range)   Midazolam (VERSED) 50 mg in 50mL NS infusion (has no administration in time range)   norepinephrine (LEVOPHED) 8 mg in 250 mL NS infusion (premix) (has no administration in time range)   HYDROmorphone (DILAUDID) injection 0.5 mg (0.5 mg Intravenous Given 8/31/23 1246)   ondansetron (ZOFRAN) injection 8 mg (8 mg Intravenous Given 8/31/23 1247)   EPINEPHrine  (ADRENALIN) injection (1 mg Intravenous Given 8/31/23 1317)   etomidate (AMIDATE) injection (30 mg Intravenous Given 8/31/23 1313)   succinylcholine (ANECTINE) injection (150 mg Intravenous Given 8/31/23 1313)   atropine sulfate injection (1 mg Intravenous Given 8/31/23 1316)   Naloxone HCl (NARCAN) injection (2 mg Intravenous Given 8/31/23 1318)   sodium chloride 0.9 % bolus 1,000 mL (1,000 mL Intravenous New Bag 8/31/23 1327)   EPINEPHrine (ADRENALIN) injection (1 mg Intravenous Given 8/31/23 1329)       PROGRESS, DATA ANALYSIS, CONSULTS, AND MEDICAL DECISION MAKING  A complete history and physical exam have been performed.  All available laboratory and imaging results have been reviewed by myself prior to disposition.    MDM    After the initial H&P, I discussed pertinent information from history and physical exam with patient/family.  Discussed differential diagnosis.  Discussed plan for ED evaluation/workup/treatment.  All questions answered.  Patient/family is agreeable with plan.  ED Course as of 08/31/23 1446   Thu Aug 31, 2023   1216 Medical history reviewed and significant for: Patient was seen in the emergency department on the 16th of this month, complained of 10 days of left-sided abdominal pain, ED work-up was reassuring, pain localized to area where patient has shingles on her left flank. [JG]   1217 Patient was admitted to the hospital at the end of July this year, admitted for generalized weakness, had worsening thrombocytopenia, treated with IV steroids.  Patient was found to have shingles while inpatient. [JG]   1231 My differential diagnosis for abdominal pain includes but is not limited to:  Gastritis, gastroenteritis, peptic ulcer disease, GERD, esophageal perforation, acute appendicitis, mesenteric adenitis, Meckel's diverticulum, epiploic appendagitis, diverticulitis, colon cancer, ulcerative colitis, Crohn's disease, intussusception, small bowel obstruction, adhesions, ischemic bowel,  perforated viscus, ileus, obstipation, biliary colic, cholecystitis, cholelithiasis, Anup-Thompson Shorty, hepatitis, pancreatitis, common bile duct obstruction, cholangitis, bile leak, splenic trauma, splenic rupture, splenic infarction, splenic abscess, abdominal abscess, ascites, spontaneous bacterial peritonitis, hernia, UTI, cystitis, prostatitis, ureterolithiasis, urinary obstruction, ovarian cyst, torsion, pregnancy, ectopic pregnancy, PID, pelvic abscess, mittelschmerz, endometriosis, AAA, myocardial infarction, pneumonia, cancer, porphyria, DKA, medications, sickle cell, viral syndrome, zoster   [JG]   1231 I had joseph discussion with the patient and patient's family member at the onset, discussed I am happy to evaluate patient for her abdominal pain and we will be obtaining lab work as well as CT imaging, discussed monitor happy to treat her pain here, if I do not find any acute processes, that she would have to follow-up with who is already prescribing her narcotic medication for further management  [JG]   1235 Patient on chronic pain medication, Norco 7.5 x 5 times daily.  Will give patient IV pain medication, using Dilaudid has less risk of side effects. [JG]   1322 Back to bedside by nursing staff, patient received pain medication, shortly afterwards became bradycardic, underwent cardiac arrest.  PEA arrest, CPR performed, patient given Narcan and as well as epinephrine, ROSC achieved after 1 round of CPR, patient became bradycardic, atropine given, patient had repeat cardiac arrest, again PEA, CPR performed, patient was given epinephrine, achieved ROSC.  Patient again became bradycardic, underwent cardiac arrest, patient received additional dose of atropine epinephrine and Narcan, given chief cardiac arrest.  Patient bradycardic, being started on epinephrine drip. [JG]   1324 I reviewed chest x-ray in real-time immediately after acquisition, my interpretation is endotracheal tube at aissatou.  Endotracheal  tube currently at 23 cm, withdrawn to 21 cm. [JG]   1324 Patient hypotensive of, maxed out on epi, rechecking blood pressure, will add norepinephrine if needed. [JG]   1325 Patient maxed on epinephrine, continues to be bradycardic hypotensive.  Given additional dose of atropine, started on nor epi drip, given additional IV fluids. [JG]   1330 EKG independently viewed and contemporaneously interpreted by ED physician. Time: 1326.  Rate 70.  Interpretation: Sinus versus ectopic atrial rhythm, right axis deviation, right bundle branch block, ST elevation in V1 and V2, ST depression with T wave inversion in inferior leads, no unifocal PVC present, no contiguous PVCs. [JG]   1334 Patient again suffered cardiac arrest.  I went and spoke with patient's family, they stated patient wished to be DNR, they wish for me to cease resuscitation, wish to see patient before ceasing.  I walked family member back to patient's bedside, CPR ongoing, pulse check obtained, patient PEA, pulseless.  Time of death called at 1331.  I offered my condolences to the patient's family and answered any questions. [JG]   1443 Patient's son who is a physician now present, I went and spoke with him, answered any questions that he had and offered my heartfelt condolences. [JG]      ED Course User Index  [JG] Matt Roldan MD       AS OF 14:46 EDT VITALS:    BP - (!) 59/37  HR - 107  TEMP - 97.6 °F (36.4 °C)  O2 SATS - (!) 68%    DIAGNOSIS  Final diagnoses:   Cardiac arrest   Bradycardia   Left sided abdominal pain     Critical care:  Total critical care time of 45 minutes is exclusive of any other billable procedures and includes time spent with direct patient care and observation, retrospective chart review, management of acute condition, and consultation with other physicians.      DISPOSITION       Matt Roldan MD  [phone]       Matt Roldan MD  23 1447

## 2023-08-31 NOTE — OUTREACH NOTE
Patient Outreach    AMBULATORY CASE MANAGEMENT NOTE    Name and Relationship of Patient/Support Person: Helena Carmen - Self    Call placed to patient at preferred number answered by her daughter who is her primary caregiver. Introduced self and role of ACM. Per Radha Mrs Carmen is in severe pain. She is unable to sleep. She has to stay in her recliner. She is taking Hydrocodone 7.5 every 6 hours with out relief. Mrs Carmen has inoperable fractures in her back. She is also suffering from shingles. The current pain level is severe which is very different from her usual pain from the back alone. She is nauseated and having abdominal pain as well. She is taking stool softners and did have a very loose stool yesterday.  Mrs Carmen has not followed up with PCP since hospital discharge due to lack of available appointments. He only works part time. The soonest appointment available was 9/15. Mrs Carmen has never seen a pain management group. She did see ortho and was told there was nothing that could be done for her back. ACM suggested ED due to severe pain at this time. Radha is going to discuss with her mother and take her if she is willing. Agree ACM will call tomorrow to see how she is doing.     Education Documentation  unresolved or worsening symptoms, taught by Dayana Lovett RN at 8/31/2023 11:01 AM.  Learner: Family  Readiness: Eager  Method: Explanation  Response: Verbalizes Understanding    signs/symptoms, taught by Dayana Lovett RN at 8/31/2023 11:01 AM.  Learner: Family  Readiness: Eager  Method: Explanation  Response: Verbalizes Understanding    description, taught by Dayana Lovett RN at 8/31/2023 11:01 AM.  Learner: Family  Readiness: Eager  Method: Explanation  Response: Verbalizes Understanding    sleep/rest, taught by Dayana Lovett RN at 8/31/2023 11:01 AM.  Learner: Family  Readiness: Eager  Method: Explanation  Response: Verbalizes Understanding    pain management, taught by Bony  Patient seen and examined  Going CT head and EEG, neuro consulted. No more seizures.    Dayana, RN at 8/31/2023 11:01 AM.  Learner: Family  Readiness: Eager  Method: Explanation  Response: Verbalizes Understanding    medication management, taught by Dayana Lovett RN at 8/31/2023 11:01 AM.  Learner: Family  Readiness: Eager  Method: Explanation  Response: Verbalizes Understanding    coping strategies, taught by Dayana Lovett, RN at 8/31/2023 11:01 AM.  Learner: Family  Readiness: Eager  Method: Explanation  Response: Verbalizes Understanding    back health, taught by Dayana Lovett, RN at 8/31/2023 11:01 AM.  Learner: Family  Readiness: Eager  Method: Explanation  Response: Verbalizes Understanding          Dayana FRANCOIS  Ambulatory Case Management    8/31/2023, 11:02 EDT

## 2023-09-01 ENCOUNTER — TELEPHONE (OUTPATIENT)
Dept: CARDIOLOGY | Facility: CLINIC | Age: 88
End: 2023-09-01
Payer: MEDICARE

## 2023-09-01 NOTE — TELEPHONE ENCOUNTER
"    "Please be informed that patient has passed. Patient has been marked  in the system. The date of death is: 2023 .    Caller: HOSEA        Best call back number: [phone]    HOSEA WANTED TO TELL DR. LUIS ON BEHALF OF THE [facility] FAMILY THEY ARE VERY GRATEFUL FOR EVERYTHING SHE DID FOR JAQUELIN.  "

## 2024-09-10 NOTE — PLAN OF CARE
"No results found for: \"HGBA1C\"    Recent Labs     09/09/24  2104 09/10/24  0734 09/10/24  1118 09/10/24  1549   POCGLU 183* 147* 174* 199*       Blood Sugar Average: Last 72 hrs:  (P) [id number]/5/2024  HBA1C: 6.8       Patient has been on Humalog sliding scale with Accu-Cheks before every meal and at bedtime and diabetic diet during hospitalization.  May resume Januvia, empagliflozin and metformin upon discharge      " Goal Outcome Evaluation:  Plan of Care Reviewed With: patient        Progress: improving  Outcome Evaluation: Pt was found sitting UIC with family present. Pt demo's improvement in activity tolerance today. She is able to stand with CGA and use RW to amb in room to the sink with 2 seated rest breaks provided d/t fatigue. Cues for navigation provided. She completes simple grooming task standing at the sink with UE support on counter. Fatigued after session but pleasant about improvement today. Will cont to follow

## 2024-10-07 NOTE — TELEPHONE ENCOUNTER
Patient is aware.    The recording was initiated after a verbal consent was obtained from the patient to record this visit for documentation in their clinical record.